# Patient Record
Sex: MALE | Race: WHITE | NOT HISPANIC OR LATINO | Employment: OTHER | URBAN - METROPOLITAN AREA
[De-identification: names, ages, dates, MRNs, and addresses within clinical notes are randomized per-mention and may not be internally consistent; named-entity substitution may affect disease eponyms.]

---

## 2017-01-21 RX ORDER — TIOTROPIUM BROMIDE 18 UG/1
CAPSULE ORAL; RESPIRATORY (INHALATION)
Qty: 30 CAPSULE | Refills: 6 | Status: SHIPPED | OUTPATIENT
Start: 2017-01-21 | End: 2017-08-31 | Stop reason: SDUPTHER

## 2017-01-31 RX ORDER — ATORVASTATIN CALCIUM 80 MG/1
TABLET, FILM COATED ORAL
Qty: 30 TABLET | Refills: 6 | Status: SHIPPED | OUTPATIENT
Start: 2017-01-31 | End: 2017-09-11 | Stop reason: SDUPTHER

## 2017-02-03 ENCOUNTER — INITIAL CONSULT (OUTPATIENT)
Dept: BARIATRICS | Facility: CLINIC | Age: 72
End: 2017-02-03
Payer: MEDICARE

## 2017-02-03 VITALS
WEIGHT: 242.31 LBS | DIASTOLIC BLOOD PRESSURE: 82 MMHG | SYSTOLIC BLOOD PRESSURE: 126 MMHG | HEART RATE: 79 BPM | HEIGHT: 69 IN | BODY MASS INDEX: 35.89 KG/M2

## 2017-02-03 DIAGNOSIS — G47.33 OSA (OBSTRUCTIVE SLEEP APNEA): Primary | ICD-10-CM

## 2017-02-03 DIAGNOSIS — Z87.891 HISTORY OF SMOKING 30 OR MORE PACK YEARS: ICD-10-CM

## 2017-02-03 DIAGNOSIS — I10 HTN (HYPERTENSION), BENIGN: ICD-10-CM

## 2017-02-03 DIAGNOSIS — I25.10 CORONARY ARTERY DISEASE INVOLVING NATIVE CORONARY ARTERY OF NATIVE HEART WITHOUT ANGINA PECTORIS: Chronic | ICD-10-CM

## 2017-02-03 DIAGNOSIS — K21.9 GASTROESOPHAGEAL REFLUX DISEASE WITHOUT ESOPHAGITIS: ICD-10-CM

## 2017-02-03 PROCEDURE — 99999 PR PBB SHADOW E&M-EST. PATIENT-LVL V: CPT | Mod: PBBFAC,,, | Performed by: PHYSICIAN ASSISTANT

## 2017-02-03 PROCEDURE — 99215 OFFICE O/P EST HI 40 MIN: CPT | Mod: PBBFAC | Performed by: PHYSICIAN ASSISTANT

## 2017-02-03 PROCEDURE — 99204 OFFICE O/P NEW MOD 45 MIN: CPT | Mod: S$PBB,,, | Performed by: PHYSICIAN ASSISTANT

## 2017-02-03 RX ORDER — OMEPRAZOLE 20 MG/1
CAPSULE, DELAYED RELEASE ORAL
Qty: 30 CAPSULE | Refills: 10 | Status: SHIPPED | OUTPATIENT
Start: 2017-02-03 | End: 2017-12-15 | Stop reason: SDUPTHER

## 2017-02-03 NOTE — LETTER
[facility]  [address]  [facility] [address]  Phone: [phone]  Fax: [phone] February 3, 2017      Prince Ba MD  [address]  [facility] [address]    Patient: Jordin Allen Jr.   MR Number: [id number]   YOB: 1945   Date of Visit: 2/3/2017     Dear Dr. Ba:    Jordin Allen is being evaluated for bariatric surgery, specifically the gastric sleeve. Please provide a risk stratification for Mr Allen prior to surgery.  He will call your office to set up an appointment with you.      Jordin Allen Jr. is a 71-year-old male presenting for obesity Body mass index is 35.78 kg/(m^2), and inability to lose weight. The patient has tried low calorie dieting and weight loss clinic about 30 years ago. He states that he was able to lose about 30-35 pounds through the weight loss clinic. He states that he gained weight surrounding his surgeries in  and then his wife  around that same time. He states that he has not had success inw eight loss since. His weight prior to this was 210 pounds. He is currently at his heaviest adult weight.     DIAGNOSIS:  1. Obesity with Body mass index is 35.78 kg/(m^2). and inability to lose weight.  2. Co-morbidities: CAD s/p stenting, GERD, SHOLA on CPAP, hypertension, and COPD.  3. History of tobacco use, quit 4 years ago.      PLAN:  The patient is a good candidate for Bariatric Surgery. he is interested in sleeve gastrectomy with Dr. Cornejo. The surgery and post-op care were discussed in detail with the patient. All questions were answered.     He understands that bariatric surgery is a tool to aid in weight loss and that he needs to be committed to the diet and exercise post-operatively for successful weight loss. Discussed expected weight loss outcomes after surgery which is 50% of the excess weight on his frame. Goal weight is 200#s. Discussed with patient that bariatric surgery is not the easy way out and that  it will take plenty of dedication on the patient's part to be successful. Also discussed the possibility of weight regain if the patient strays from the diet guidelines or exercise requirements. Patient verbalized understanding and wishes to proceed with the work-up.     ORDERS:  1. Chest X-Ray and EKG  2. Psychological Consult, Bariatric Dietician Consult, Cardiac Clearance, Pulmonology Clearance and PCP Clearance  3. Bariatric Labs: BMP, CBC, Folate Serum, H. pylori, HgA1C, Hepatic Panel/LFT, Iron & TIBC, Lipid Profile, Magnesium, Phosphate, T3, T4, TSH, Free T4, Vitamin B12, and Vitamin B1.    If you have questions, please do not hesitate to call me. I look forward to following Jordin along with you.    Sincerely,      Tanya Jensen PA-C   Section of Bariatric Surgery  [facility]    LUZ MARINA/eric    CC  LISA Mccain MD

## 2017-02-03 NOTE — PROGRESS NOTES
BARIATRIC NEW PATIENT EVALUATION    CHIEF COMPLAINT:   Obesity Body mass index is 35.78 kg/(m^2). and inability to lose weight.    HISTORY OF PRESENT ILLNESS:  Jordin Allen Jr.  is a 71 y.o. male presenting for obesity Body mass index is 35.78 kg/(m^2). and inability to lose weight. The patient has tried low calorie dieting and weight loss clinic about 30 years ago.  He states that he was able to lose about 30-35 pounds through the weight loss clinic.  He states that he gained weight surrounding his surgeries in  and then his wife  around that same time.  He states that he has not had success inw eight loss since.  His weight prior to this was 210 pounds.  He is currently at his heaviest adult weight.       PAST MEDICAL HISTORY:  Past Medical History   Diagnosis Date    Benign neoplasm of colon 10/1/2013    Bronchitis chronic     CAD (coronary artery disease) 10/31/2013    COPD (chronic obstructive pulmonary disease)     Emphysema of lung     GERD (gastroesophageal reflux disease)     Hx of colonic polyps     Hypertension     SHOLA on CPAP     RLS (restless legs syndrome)     Screen for colon cancer 2013         PAST SURGICAL HISTORY:  Past Surgical History   Procedure Laterality Date    Cataract extraction, bilateral      Bilateral foot surgery      Cardiac catheterization      Colon surgery       Ace Mott MD       FAMILY HISTORY:  Family History   Problem Relation Age of Onset    Heart disease Mother     Asthma Neg Hx     Emphysema Neg Hx     Prostate cancer Neg Hx        SOCIAL HISTORY:  Social History     Social History    Marital status:      Spouse name: N/A    Number of children: N/A    Years of education: N/A     Occupational History    Not on file.     Social History Main Topics    Smoking status: Former Smoker     Packs/day: 1.00     Years: 40.00     Types: Cigarettes     Quit date: 8/15/2012    Smokeless tobacco: Never Used    Alcohol use Yes       Comment: social use only    Drug use: No    Sexual activity: Yes     Partners: Female     Other Topics Concern    Not on file     Social History Narrative    Retired .  .         MEDICATIONS:  Current Outpatient Prescriptions   Medication Sig Dispense Refill    albuterol 90 mcg/actuation inhaler Inhale 2 puffs into the lungs every 4 (four) hours as needed for Wheezing. 1 each 12    albuterol-ipratropium 2.5mg-0.5mg/3mL (DUO-NEB) 0.5 mg-3 mg(2.5 mg base)/3 mL nebulizer solution INHALE 1 VIAL VIA NEBULIZER EVERY 6 HOURS AS NEEDED 180 mL 5    amitriptyline (ELAVIL) 25 MG tablet TAKE 1 TABLET BY MOUTH EVERY DAY 30 tablet 6    aspirin (ECOTRIN) 81 MG EC tablet Take 81 mg by mouth once.      atorvastatin (LIPITOR) 80 MG tablet TAKE 1 TABLET (80 MG TOTAL) BY MOUTH ONCE DAILY. 30 tablet 6    BETA-CAROTENE,A, W-C & E/MIN (OCUVITE ORAL) Take by mouth once daily.       DALIRESP 500 mcg Tab TAKE 1 TABLET BY MOUTH EVERY DAY 30 tablet 6    echinacea 400 mg Cap Take by mouth once daily.       fluticasone (FLONASE) 50 mcg/actuation nasal spray INSTILL 1 SPRAY IN EACH NOSTRIL ONCE DAILY. 16 g 6    hydrochlorothiazide (HYDRODIURIL) 25 MG tablet Take 0.5 tablets (12.5 mg total) by mouth once daily. 15 tablet 11    latanoprost 0.005 % ophthalmic solution Place 1 drop into both eyes once daily.       losartan (COZAAR) 100 MG tablet TAKE 1 TABLET BY MOUTH ONCE A DAY 30 tablet 3    nitroGLYCERIN (NITROSTAT) 0.4 MG SL tablet Place 0.4 mg under the tongue every 5 (five) minutes as needed.      omeprazole (PRILOSEC) 20 MG capsule TAKE 1 CAPSULE BY MOUTH EVERY DAY 30 capsule 10    predniSONE (DELTASONE) 5 MG tablet Take 1 tab PO every other day.  Take with food. 16 tablet 6    SPIRIVA WITH HANDIHALER 18 mcg inhalation capsule INHALE 1 CAPSULE WITH INHALATION DEVICE ONCE DAILY 30 capsule 6    SYMBICORT 160-4.5 mcg/actuation HFAA INHALE 2 PUFFS INTO THE LUNGS EVERY 12 HOURS. RINSE MOUTH AFTER USE 10.2  "Inhaler 6    ZOSTAVAX, PF, 19,400 unit/0.65 mL injection        No current facility-administered medications for this visit.        ALLERGIES:  Review of patient's allergies indicates:   Allergen Reactions    Brilinta [ticagrelor] Itching    Lisinopril      Other reaction(s): cough    Metoprolol succinate Rash       ROS:  Review of Systems   Constitutional: Negative for chills, fever and weight loss.   Eyes: Negative for blurred vision, double vision and pain.   Respiratory: Negative for cough, shortness of breath and wheezing.    Cardiovascular: Negative for chest pain, palpitations and leg swelling.   Gastrointestinal: Negative for abdominal pain, blood in stool, constipation, diarrhea, heartburn, melena, nausea and vomiting.   Genitourinary: Negative for dysuria, frequency and hematuria.   Skin: Negative for itching and rash.   Neurological: Negative for headaches.   Psychiatric/Behavioral: Negative for depression and suicidal ideas.       PE:  Vitals:    02/03/17 1314   BP: 126/82   Pulse: 79   Weight: 109.9 kg (242 lb 4.6 oz)   Height: 5' 9" (1.753 m)       Physical Exam   Constitutional: He is oriented to person, place, and time. He appears well-developed and well-nourished. No distress.   HENT:   Head: Normocephalic and atraumatic.   Eyes: Right eye exhibits no discharge. Left eye exhibits no discharge. No scleral icterus.   Cardiovascular: Normal rate, regular rhythm, normal heart sounds and intact distal pulses.    Pulmonary/Chest: Effort normal and breath sounds normal. No respiratory distress.   Abdominal: Soft. Bowel sounds are normal. He exhibits no distension and no mass. There is no tenderness. There is no rebound and no guarding. No hernia.   Neurological: He is alert and oriented to person, place, and time.   Skin: Skin is warm and dry. No rash noted. He is not diaphoretic. No erythema. No pallor.   Psychiatric: He has a normal mood and affect. His behavior is normal. Judgment and thought " content normal.   Nursing note and vitals reviewed.       DIAGNOSIS:  1. Obesity with Body mass index is 35.78 kg/(m^2). and inability to lose weight.  2. Co-morbidities: CAD s/p stenting, GERD, SHOLA on CPAP, hypertension, and COPD.  3. History of tobacco use, quit 4 years ago.     PLAN:  The patient is a good candidate for Bariatric Surgery. he is interested in sleeve gastrectomy with Dr. Cornejo. The surgery and post-op care were discussed in detail with the patient. All questions were answered.    he understands that bariatric surgery is a tool to aid in weight loss and that he needs to be committed to the diet and exercise post-operatively for successful weight loss.  Discussed expected weight loss outcomes after surgery which is 50% of the excess weight on his frame.  Goal weight is 200#s.  Discussed with patient that bariatric surgery is not the easy way out and that it will take plenty of dedication on the patient's part to be successful. Also discussed the possibility of weight regain if the patient strays from the diet guidelines or exercise requirements. Patient verbalized understanding and wishes to proceed with the work-up.    ORDERS:  1. Chest X-Ray and EKG  2. Psychological Consult, Bariatric Dietician Consult, Cardiac Clearance, Pulmonology Clearance and PCP Clearance  3. Bariatric Labs: BMP, CBC, Folate Serum, H. pylori, HgA1C, Hepatic Panel/LFT, Iron & TIBC, Lipid Profile, Magnesium, Phosphate, T3, T4, TSH, Free T4, Vitamin B12, and Vitamin B1.    Primary Physician: APRIL Mccain MD  RTC: As scheduled.    Blue packet reviewed, pertinent information entered in Epic.    45 minute visit, over 50% of time spent counseling patient face to face on surgical options, risks, benefits, expected diet, recommended exercise regimen, and expected weight loss.

## 2017-02-03 NOTE — MR AVS SNAPSHOT
[facility] - Bariatric Surgery  [address]  [facility] [address]  Phone: [phone]  Fax: [phone]                  Jordin Allen    2/3/2017 1:40 PM   Initial consult    Description:  Male : 1945   Provider:  Tanya Jensen PA-C   Department:  [facility] - Bariatric Surgery           Reason for Visit     Consult           Diagnoses this Visit        Comments    SHOLA (obstructive sleep apnea)    -  Primary     Gastroesophageal reflux disease without esophagitis         Coronary artery disease involving native coronary artery of native heart without angina pectoris         HTN (hypertension), benign         History of smoking 30 or more pack years         Obesity, Class II, BMI 35-39.9, with comorbidity                To Do List           Goals (5 Years of Data)     None      Follow-Up and Disposition     Return for diet appointment.      [facility]     [facility] -  Assistance  Registered nurses in the [facility] provide clinical advisement, health education, appointment booking, and other advisory services.  Call for this free service at [phone].             Medications           Message regarding Medications     Verify the changes and/or additions to your medication regime listed below are the same as discussed with your clinician today.  If any of these changes or additions are incorrect, please notify your healthcare provider.        STOP taking these medications     loratadine (CLARITIN) 10 mg tablet Take 1 tablet (10 mg total) by mouth once daily.           Verify that the below list of medications is an accurate representation of the medications you are currently taking.  If none reported, the list may be blank. If incorrect, please contact your healthcare provider. Carry this list with you in case of emergency.           Current Medications     albuterol 90 mcg/actuation inhaler Inhale 2 puffs into the lungs every 4 (four)  "hours as needed for Wheezing.    albuterol-ipratropium 2.5mg-0.5mg/3mL (DUO-NEB) 0.5 mg-3 mg(2.5 mg base)/3 mL nebulizer solution INHALE 1 VIAL VIA NEBULIZER EVERY 6 HOURS AS NEEDED    amitriptyline (ELAVIL) 25 MG tablet TAKE 1 TABLET BY MOUTH EVERY DAY    aspirin (ECOTRIN) 81 MG EC tablet Take 81 mg by mouth once.    atorvastatin (LIPITOR) 80 MG tablet TAKE 1 TABLET (80 MG TOTAL) BY MOUTH ONCE DAILY.    BETA-CAROTENE,A, W-C & E/MIN (OCUVITE ORAL) Take by mouth once daily.     DALIRESP 500 mcg Tab TAKE 1 TABLET BY MOUTH EVERY DAY    echinacea 400 mg Cap Take by mouth once daily.     fluticasone (FLONASE) 50 mcg/actuation nasal spray INSTILL 1 SPRAY IN EACH NOSTRIL ONCE DAILY.    hydrochlorothiazide (HYDRODIURIL) 25 MG tablet Take 0.5 tablets (12.5 mg total) by mouth once daily.    latanoprost 0.005 % ophthalmic solution Place 1 drop into both eyes once daily.     losartan (COZAAR) 100 MG tablet TAKE 1 TABLET BY MOUTH ONCE A DAY    nitroGLYCERIN (NITROSTAT) 0.4 MG SL tablet Place 0.4 mg under the tongue every 5 (five) minutes as needed.    omeprazole (PRILOSEC) 20 MG capsule TAKE 1 CAPSULE BY MOUTH EVERY DAY    predniSONE (DELTASONE) 5 MG tablet Take 1 tab PO every other day.  Take with food.    SPIRIVA WITH HANDIHALER 18 mcg inhalation capsule INHALE 1 CAPSULE WITH INHALATION DEVICE ONCE DAILY    SYMBICORT 160-4.5 mcg/actuation HFAA INHALE 2 PUFFS INTO THE LUNGS EVERY 12 HOURS. RINSE MOUTH AFTER USE    ZOSTAVAX, PF, 19,400 unit/0.65 mL injection            Clinical Reference Information           Your Vitals Were     BP Pulse Height Weight BMI    126/82 79 5' 9" (1.753 m) 109.9 kg (242 lb 4.6 oz) 35.78 kg/m2      Blood Pressure          Most Recent Value    BP  126/82      Allergies as of 2/3/2017     Brilinta [Ticagrelor]    Lisinopril    Metoprolol Succinate      Immunizations Administered on Date of Encounter - 2/3/2017     None      Orders Placed During Today's Visit      Normal Orders This Visit    Psych " Consult     Future Labs/Procedures Expected by Expires    BMP  2/3/2017 4/4/2018    CBC w/ Auto Differential  2/3/2017 4/4/2018    CXR  2/3/2017 2/3/2018    Free T4  2/3/2017 4/4/2018    H. Pylori Antibody, IGG  2/3/2017 4/4/2018    Hepatic Panel  2/3/2017 4/4/2018    Lipid Profile  2/3/2017 4/4/2018    Magnesium  2/3/2017 4/4/2018    Phosphorus  2/3/2017 4/4/2018    T3  2/3/2017 4/4/2018    T4  2/3/2017 4/4/2018    TSH  2/3/2017 4/4/2018    Vitamin B1  2/3/2017 4/4/2018    EKG  As directed 2/3/2018      Instructions    Prior to surgery you will need to complete:  - Dietitian consult and follow up appointments as needed  - PCP clearance (30 days of surgery)  - Labs  - Chest X-ray  - EKG  - Psychological evaluation, Please call psychiatry [phone] to schedule  - Cardiac clearance  - Pulmonology Clearance    In preparation for bariatric surgery, please complete the following:   · Discuss your current medications with your primary care provider, remember your medications will need to be crushed, chewable, or in liquid form for the first 3-6 months after a gastric bypass or sleeve.  For a gastric band, your medications will need to be crushed indefinitely.    · If you take a blood thinner such as: Coumadin (warfarin), Pradaxa (dabigatran), or Plavix (clopidogrel), you will need to speak with your prescribing provider on how or if this medication can be stopped before surgery.   · If you take a medication for depression or anxiety, you will need to begin crushing or opening the capsule 1-3 months prior to surgery.  Remember to discuss this with the psychologist or psychiatrist that you see.   · If you take medication for arthritis on a daily basis that is considered a non-steroidal anti-inflammatory (NSAID), please discuss with your prescribing physician an alternative medication.  After having gastric bypass or gastric sleeve, this group of medications is not appropriate to take due to increased risk of bleeding  stomach ulcers.      DEFINITIONS  Appointments: Pre-scheduled meetings or consultations with any physician, advanced practice provider, dietitian, or psychologist, and labs, imaging studies, sleep studies, etc.   Late cancellation: Cancelling an appointment 24-48 hours prior to scheduled time.  No-Show appointment:  is when    You do NOT arrive to your appointment at the time its scheduled.   You call to cancel or cancel via [facility] less than 24 hours in advance of your scheduled appointment   You show up 15 minutes AFTER your scheduled appointment time without any notification of being late.     POLICY  1. You are allowed up to 3 cancellations for appointments.    After 3 cancellations your case will be placed on hold for 2 months and after that time you can resume the program.   2. You are allowed only 1 no-show for an appointment.    You will be re-scheduled one time and if there is a 2nd no-show at any point, your case will be placed on hold for 3 months.  After 3 months you can resume the program.     3. Upon resuming the program after being placed on hold for either above mentioned reasons, if you have a late cancel or no show for any appointment, the bariatric team will review if youre an appropriate candidate for surgery at the monthly meeting.             Language Assistance Services     ATTENTION: Language assistance services are available, free of charge. Please call [phone].      ATENCIÓN: Si nii couch, tiene a asher disposición servicios gratuitos de asistencia lingüística. Llame al [phone].     WIN Ý: N?u b?n nói Ti?ng Vi?t, có các d?ch v? h? tr? ngôn ng? mi?n phí dành cho b?n. G?i s? [phone].         [facility] complies with applicable Federal civil rights laws and does not discriminate on the basis of race, color, national origin, age, disability, or sex.

## 2017-02-03 NOTE — LETTER
[facility]  [address]  [facility] [address]  Phone: [phone]  Fax: [phone] February 3, 2017      SERENA Estrada MD  [address]  [facility] [address]    Patient: Jordin Allen Jr.   MR Number: [id number]   YOB: 1945   Date of Visit: 2/3/2017     Dear Dr. Estrada:    Jordin Allen is being evaluated for Bariatric surgery, specifically the gastric sleeve. Please provide a risk stratification for Mr Allen prior to surgery.  He will call your office to set up an appointment with you.      Patient is a 71-year-old male presenting for obesity Body mass index is 35.78 kg/(m^2), and inability to lose weight. The patient has tried low calorie dieting and weight loss clinic about 30 years ago. He states that he was able to lose about 30-35 pounds through the weight loss clinic. He states that he gained weight surrounding his surgeries in  and then his wife  around that same time. He states that he has not had success inw eight loss since. His weight prior to this was 210 pounds. He is currently at his heaviest adult weight.      DIAGNOSIS:  1. Obesity with Body mass index is 35.78 kg/(m^2). and inability to lose weight.  2. Co-morbidities: CAD status post stenting, GERD, SHOLA on CPAP, hypertension, and COPD.  3. History of tobacco use, quit 4 years ago.      PLAN: The patient is a good candidate for Bariatric Surgery. he is interested in sleeve gastrectomy with Dr. Cornejo. The surgery and post-op care were discussed in detail with the patient. All questions were answered.     He understands that bariatric surgery is a tool to aid in weight loss and that he needs to be committed to the diet and exercise post-operatively for successful weight loss. Discussed expected weight loss outcomes after surgery which is 50% of the excess weight on his frame. Goal weight is 200#s. Discussed with patient that bariatric surgery is not the easy way out and that  it will take plenty of dedication on the patient's part to be successful. Also discussed the possibility of weight regain if the patient strays from the diet guidelines or exercise requirements. Patient verbalized understanding and wishes to proceed with the work-up.      ORDERS:  1. Chest X-Ray and EKG  2. Psychological Consult, Bariatric Dietician Consult, Cardiac Clearance, Pulmonology Clearance and PCP Clearance  3. Bariatric Labs: BMP, CBC, Folate Serum, H. pylori, HgA1C, Hepatic Panel/LFT, Iron & TIBC, Lipid Profile, Magnesium, Phosphate, T3, T4, TSH, Free T4, Vitamin B12, and Vitamin B1.      If you have questions, please do not hesitate to call me. I look forward to following Jordin along with you.    Sincerely,      Tanya Jensen PA-C   Section of Bariatric Surgery  [facility]    LUZ MARINA/eric

## 2017-02-03 NOTE — PATIENT INSTRUCTIONS
Prior to surgery you will need to complete:  - Dietitian consult and follow up appointments as needed  - PCP clearance (30 days of surgery)  - Labs  - Chest X-ray  - EKG  - Psychological evaluation, Please call psychiatry [phone] to schedule  - Cardiac clearance  - Pulmonology Clearance    In preparation for bariatric surgery, please complete the following:   · Discuss your current medications with your primary care provider, remember your medications will need to be crushed, chewable, or in liquid form for the first 3-6 months after a gastric bypass or sleeve.  For a gastric band, your medications will need to be crushed indefinitely.    · If you take a blood thinner such as: Coumadin (warfarin), Pradaxa (dabigatran), or Plavix (clopidogrel), you will need to speak with your prescribing provider on how or if this medication can be stopped before surgery.   · If you take a medication for depression or anxiety, you will need to begin crushing or opening the capsule 1-3 months prior to surgery.  Remember to discuss this with the psychologist or psychiatrist that you see.   · If you take medication for arthritis on a daily basis that is considered a non-steroidal anti-inflammatory (NSAID), please discuss with your prescribing physician an alternative medication.  After having gastric bypass or gastric sleeve, this group of medications is not appropriate to take due to increased risk of bleeding stomach ulcers.      DEFINITIONS  Appointments: Pre-scheduled meetings or consultations with any physician, advanced practice provider, dietitian, or psychologist, and labs, imaging studies, sleep studies, etc.   Late cancellation: Cancelling an appointment 24-48 hours prior to scheduled time.  No-Show appointment:  is when    You do NOT arrive to your appointment at the time its scheduled.   You call to cancel or cancel via [facility] less than 24 hours in advance of your scheduled appointment   You show up 15 minutes  AFTER your scheduled appointment time without any notification of being late.     POLICY  1. You are allowed up to 3 cancellations for appointments.    After 3 cancellations your case will be placed on hold for 2 months and after that time you can resume the program.   2. You are allowed only 1 no-show for an appointment.    You will be re-scheduled one time and if there is a 2nd no-show at any point, your case will be placed on hold for 3 months.  After 3 months you can resume the program.     3. Upon resuming the program after being placed on hold for either above mentioned reasons, if you have a late cancel or no show for any appointment, the bariatric team will review if youre an appropriate candidate for surgery at the monthly meeting.

## 2017-02-08 DIAGNOSIS — J43.8 OTHER EMPHYSEMA: Primary | ICD-10-CM

## 2017-02-09 ENCOUNTER — OFFICE VISIT (OUTPATIENT)
Dept: CARDIOLOGY | Facility: CLINIC | Age: 72
End: 2017-02-09
Payer: MEDICARE

## 2017-02-09 VITALS
BODY MASS INDEX: 35.89 KG/M2 | DIASTOLIC BLOOD PRESSURE: 68 MMHG | WEIGHT: 242.31 LBS | HEART RATE: 94 BPM | HEIGHT: 69 IN | SYSTOLIC BLOOD PRESSURE: 103 MMHG

## 2017-02-09 DIAGNOSIS — Z87.891 HISTORY OF SMOKING 30 OR MORE PACK YEARS: ICD-10-CM

## 2017-02-09 DIAGNOSIS — I25.10 CORONARY ARTERY DISEASE INVOLVING NATIVE CORONARY ARTERY OF NATIVE HEART WITHOUT ANGINA PECTORIS: Chronic | ICD-10-CM

## 2017-02-09 DIAGNOSIS — Z01.810 PREOPERATIVE CARDIOVASCULAR EXAMINATION: Primary | ICD-10-CM

## 2017-02-09 DIAGNOSIS — G47.33 OSA (OBSTRUCTIVE SLEEP APNEA): ICD-10-CM

## 2017-02-09 DIAGNOSIS — I10 HTN (HYPERTENSION), BENIGN: ICD-10-CM

## 2017-02-09 PROCEDURE — 99213 OFFICE O/P EST LOW 20 MIN: CPT | Mod: PBBFAC | Performed by: INTERNAL MEDICINE

## 2017-02-09 PROCEDURE — 99999 PR PBB SHADOW E&M-EST. PATIENT-LVL III: CPT | Mod: PBBFAC,,, | Performed by: INTERNAL MEDICINE

## 2017-02-09 PROCEDURE — 99214 OFFICE O/P EST MOD 30 MIN: CPT | Mod: S$PBB,,, | Performed by: INTERNAL MEDICINE

## 2017-02-09 NOTE — PROGRESS NOTES
Subjective:   Patient ID:  Jordin Allen Jr. is a 71 y.o. male who presents for follow up of Pre-op Exam (bariatric surgery)      HPI: Here for preoperative evaluation prior to gastric sleeve operation.  He continues with phase III rehab and continues to do very well.  He denies chest discomfort, MONTIEL, palpitations, PND/orthopnea, lightheadedness and syncope.      June 2016 HPI: Mr. Allen is a very pleasant man who has been seen by Dr. Pinedo at [facility] for an MI on [facility] three years ago. He had an occluded proximal LAD that was treated successfully and he retained normal systolic function.     Prior to his initial visit with me 15 months ago, he had had a rash and was inadvertently told by a nurse to hold his ASA and Lipitor. He's back on that now and doing fine. He has had a problem with pruritic rashes in the past but that is not a problem currently and hasn't had a flare in about 3 months. These patches are now mainly on his anterior forearms.     He's going to phase III rehab now and doing well. He denies chest discomfort, MONTIEL, palpitations, PND/orthopnea, lightheadedness and syncope.     He takes prednisone 10mg every other day as directed by a Pulmonologist.    Patient Active Problem List   Diagnosis    Centrilobular emphysema    GERD (gastroesophageal reflux disease)    HTN (hypertension), benign    Sciatica    SHOLA (obstructive sleep apnea)    Restless leg syndrome    Screen for colon cancer    Benign neoplasm of colon    CAD (coronary artery disease)    Rash    Obesity, Class II, BMI 35-39.9, with comorbidity    History of smoking 30 or more pack years       Current Outpatient Prescriptions   Medication Sig    albuterol 90 mcg/actuation inhaler Inhale 2 puffs into the lungs every 4 (four) hours as needed for Wheezing.    albuterol-ipratropium 2.5mg-0.5mg/3mL (DUO-NEB) 0.5 mg-3 mg(2.5 mg base)/3 mL nebulizer solution INHALE 1 VIAL VIA NEBULIZER EVERY 6 HOURS AS NEEDED    amitriptyline  (ELAVIL) 25 MG tablet TAKE 1 TABLET BY MOUTH EVERY DAY    aspirin (ECOTRIN) 81 MG EC tablet Take 81 mg by mouth once.    atorvastatin (LIPITOR) 80 MG tablet TAKE 1 TABLET (80 MG TOTAL) BY MOUTH ONCE DAILY.    BETA-CAROTENE,A, W-C & E/MIN (OCUVITE ORAL) Take by mouth once daily.     DALIRESP 500 mcg Tab TAKE 1 TABLET BY MOUTH EVERY DAY    echinacea 400 mg Cap Take by mouth once daily.     fluticasone (FLONASE) 50 mcg/actuation nasal spray INSTILL 1 SPRAY IN EACH NOSTRIL ONCE DAILY.    hydrochlorothiazide (HYDRODIURIL) 25 MG tablet Take 0.5 tablets (12.5 mg total) by mouth once daily.    latanoprost 0.005 % ophthalmic solution Place 1 drop into both eyes once daily.     losartan (COZAAR) 100 MG tablet TAKE 1 TABLET BY MOUTH ONCE A DAY    nitroGLYCERIN (NITROSTAT) 0.4 MG SL tablet Place 0.4 mg under the tongue every 5 (five) minutes as needed.    omeprazole (PRILOSEC) 20 MG capsule TAKE 1 CAPSULE BY MOUTH EVERY DAY    predniSONE (DELTASONE) 5 MG tablet Take 1 tab PO every other day.  Take with food.    SPIRIVA WITH HANDIHALER 18 mcg inhalation capsule INHALE 1 CAPSULE WITH INHALATION DEVICE ONCE DAILY    SYMBICORT 160-4.5 mcg/actuation HFAA INHALE 2 PUFFS INTO THE LUNGS EVERY 12 HOURS. RINSE MOUTH AFTER USE     No current facility-administered medications for this visit.        Review of Systems   Constitution: Negative.   HENT: Negative.    Eyes: Negative.    Cardiovascular: Negative.  Negative for chest pain, dyspnea on exertion, near-syncope, orthopnea and palpitations.   Respiratory: Negative.  Negative for cough, hemoptysis and shortness of breath.    Endocrine: Negative.    Hematologic/Lymphatic: Negative.    Skin: Negative.    Musculoskeletal: Negative.    Gastrointestinal: Negative.    Genitourinary: Negative.    Neurological: Negative.    Psychiatric/Behavioral: Negative.      Objective:   Physical Exam   Constitutional: He is oriented to person, place, and time. He appears well-developed and  well-nourished.   HENT:   Head: Normocephalic and atraumatic.   Mouth/Throat: Oropharynx is clear and moist.   Eyes: Conjunctivae and EOM are normal. No scleral icterus.   Neck: Normal range of motion. Neck supple. No JVD present.   Cardiovascular: Normal rate, regular rhythm, normal heart sounds and intact distal pulses.  Exam reveals no gallop and no friction rub.    No murmur heard.  Pulmonary/Chest: Effort normal and breath sounds normal. He has no wheezes. He has no rales.   Abdominal: Soft. Bowel sounds are normal. He exhibits no distension. There is no tenderness.   Musculoskeletal: Normal range of motion. He exhibits no edema.   Neurological: He is alert and oriented to person, place, and time.   Skin: Skin is warm and dry. No rash noted. No erythema.   Psychiatric: He has a normal mood and affect. His behavior is normal. Judgment and thought content normal.   Vitals reviewed.      Lab Results   Component Value Date    WBC 9.91 07/28/2016    HGB 14.1 07/28/2016    HCT 45.4 07/28/2016    MCV 85 07/28/2016     07/28/2016         Chemistry        Component Value Date/Time     07/28/2016 0710    K 4.2 07/28/2016 0710     07/28/2016 0710    CO2 29 07/28/2016 0710    BUN 17 07/28/2016 0710    CREATININE 0.9 07/28/2016 0710    GLU 93 07/28/2016 0710        Component Value Date/Time    CALCIUM 9.5 07/28/2016 0710    ALKPHOS 103 07/28/2016 0710    AST 22 07/28/2016 0710    ALT 28 07/28/2016 0710    BILITOT 0.7 07/28/2016 0710            Lab Results   Component Value Date    CHOL 98 (L) 07/28/2016    CHOL 103 (L) 07/06/2015    CHOL 100 (L) 05/19/2014     Lab Results   Component Value Date    HDL 41 07/28/2016    HDL 43 07/06/2015    HDL 43 05/19/2014     Lab Results   Component Value Date    LDLCALC 45.8 (L) 07/28/2016    LDLCALC 42.0 (L) 07/06/2015    LDLCALC 45.8 (L) 05/19/2014     Lab Results   Component Value Date    TRIG 56 07/28/2016    TRIG 90 07/06/2015    TRIG 56 05/19/2014     Lab Results    Component Value Date    CHOLHDL 41.8 07/28/2016    CHOLHDL 41.7 07/06/2015    CHOLHDL 43.0 05/19/2014       Lab Results   Component Value Date    TSH 1.171 07/28/2016       Lab Results   Component Value Date    HGBA1C 5.8 05/30/2013       Assessment:     1. Preoperative cardiovascular examination    2. Coronary artery disease involving native coronary artery of native heart without angina pectoris    3. HTN (hypertension), benign    4. SHOLA (obstructive sleep apnea)    5. History of smoking 30 or more pack years        Plan:     He has no angina, heart failure, or unstable arrhythmia.  No further cardiovascular testing is required prior to proceeding to the operating room.    Continue current medicines.    Diet/exercise goals reinforced.    F/U 12 months

## 2017-02-09 NOTE — MR AVS SNAPSHOT
[facility]iology  [address]  [facility] [address]  Phone: [phone]                  Jordin Allen JrGreg   2017 11:30 AM   Office Visit    Description:  Male : 1945   Provider:  Prince Ba MD   Department:  [facility]           Reason for Visit     Pre-op Exam           Diagnoses this Visit        Comments    Preoperative cardiovascular examination    -  Primary     Coronary artery disease involving native coronary artery of native heart without angina pectoris         HTN (hypertension), benign         SHOLA (obstructive sleep apnea)         History of smoking 30 or more pack years         Obesity, Class II, BMI 35-39.9, with comorbidity                To Do List           Future Appointments        Provider Department Dept Phone    2017 11:30 AM MD [facility] Cardiology [phone]    3/7/2017 8:10 AM LAB, APPOINTMENT [place] [facility] [phone]    3/7/2017 8:45 AM [facility] [id number] 485 LB LIMIT [facility] [phone]    3/7/2017 9:30 AM EKG, APPT [facility] [phone]    3/7/2017 10:00 AM Yohana Nails [facility] - Bariatric Surgery [phone]      Goals (5 Years of Data)     None      Follow-Up and Disposition     Return in about 1 year (around 2018).    Follow-up and Disposition History      [facility] On Call     [facility] Care [facility] -  Assistance  Registered nurses in the [facility] provide clinical advisement, health education, appointment booking, and other advisory services.  Call for this free service at [phone].             Medications           Message regarding Medications     Verify the changes and/or additions to your medication regime listed below are the same as discussed with your clinician today.  If any of these changes or additions are incorrect, please notify your healthcare provider.        STOP taking these medications     ZOSTAVAX,  PF, 19,400 unit/0.65 mL injection            Verify that the below list of medications is an accurate representation of the medications you are currently taking.  If none reported, the list may be blank. If incorrect, please contact your healthcare provider. Carry this list with you in case of emergency.           Current Medications     albuterol 90 mcg/actuation inhaler Inhale 2 puffs into the lungs every 4 (four) hours as needed for Wheezing.    albuterol-ipratropium 2.5mg-0.5mg/3mL (DUO-NEB) 0.5 mg-3 mg(2.5 mg base)/3 mL nebulizer solution INHALE 1 VIAL VIA NEBULIZER EVERY 6 HOURS AS NEEDED    amitriptyline (ELAVIL) 25 MG tablet TAKE 1 TABLET BY MOUTH EVERY DAY    aspirin (ECOTRIN) 81 MG EC tablet Take 81 mg by mouth once.    atorvastatin (LIPITOR) 80 MG tablet TAKE 1 TABLET (80 MG TOTAL) BY MOUTH ONCE DAILY.    BETA-CAROTENE,A, W-C & E/MIN (OCUVITE ORAL) Take by mouth once daily.     DALIRESP 500 mcg Tab TAKE 1 TABLET BY MOUTH EVERY DAY    echinacea 400 mg Cap Take by mouth once daily.     fluticasone (FLONASE) 50 mcg/actuation nasal spray INSTILL 1 SPRAY IN EACH NOSTRIL ONCE DAILY.    hydrochlorothiazide (HYDRODIURIL) 25 MG tablet Take 0.5 tablets (12.5 mg total) by mouth once daily.    latanoprost 0.005 % ophthalmic solution Place 1 drop into both eyes once daily.     losartan (COZAAR) 100 MG tablet TAKE 1 TABLET BY MOUTH ONCE A DAY    nitroGLYCERIN (NITROSTAT) 0.4 MG SL tablet Place 0.4 mg under the tongue every 5 (five) minutes as needed.    omeprazole (PRILOSEC) 20 MG capsule TAKE 1 CAPSULE BY MOUTH EVERY DAY    predniSONE (DELTASONE) 5 MG tablet Take 1 tab PO every other day.  Take with food.    SPIRIVA WITH HANDIHALER 18 mcg inhalation capsule INHALE 1 CAPSULE WITH INHALATION DEVICE ONCE DAILY    SYMBICORT 160-4.5 mcg/actuation HFAA INHALE 2 PUFFS INTO THE LUNGS EVERY 12 HOURS. RINSE MOUTH AFTER USE           Clinical Reference Information           Your Vitals Were     BP Pulse Height Weight BMI     "103/68 (BP Location: Left arm, Patient Position: Sitting, BP Method: Automatic) 94 5' 9" (1.753 m) 109.9 kg (242 lb 4.6 oz) 35.78 kg/m2      Blood Pressure          Most Recent Value    Right Arm BP - Sitting  108/70    Left Arm BP - Sitting  103/68    BP  103/68      Allergies as of 2/9/2017     Brilinta [Ticagrelor]    Lisinopril    Metoprolol Succinate      Immunizations Administered on Date of Encounter - 2/9/2017     None      Language Assistance Services     ATTENTION: Language assistance services are available, free of charge. Please call [phone].      ATENCIÓN: Si habla español, tiene a asher disposición servicios gratuitos de asistencia lingüística. Llame al [phone].     CHÚ Ý: N?u b?n nói Ti?ng Vi?t, có các d?ch v? h? tr? ngôn ng? mi?n phí dành cho b?n. G?i s? [phone].         [facility] complies with applicable Federal civil rights laws and does not discriminate on the basis of race, color, national origin, age, disability, or sex.        "

## 2017-02-20 ENCOUNTER — OFFICE VISIT (OUTPATIENT)
Dept: PSYCHIATRY | Facility: CLINIC | Age: 72
End: 2017-02-20
Payer: MEDICARE

## 2017-02-20 DIAGNOSIS — Z01.818 PREOPERATIVE EVALUATION TO RULE OUT SURGICAL CONTRAINDICATION: Primary | ICD-10-CM

## 2017-02-20 DIAGNOSIS — I10 ESSENTIAL HYPERTENSION: ICD-10-CM

## 2017-02-20 DIAGNOSIS — G47.33 OBSTRUCTIVE SLEEP APNEA: ICD-10-CM

## 2017-02-20 DIAGNOSIS — E66.9 OBESITY, UNSPECIFIED OBESITY SEVERITY, UNSPECIFIED OBESITY TYPE: ICD-10-CM

## 2017-02-20 PROCEDURE — 90791 PSYCH DIAGNOSTIC EVALUATION: CPT | Mod: PBBFAC | Performed by: PSYCHOLOGIST

## 2017-02-20 PROCEDURE — 96102 PR PSYCHOLOGIC TESTING BY TECHNICIAN: CPT | Mod: 59,S$PBB,, | Performed by: PSYCHOLOGIST

## 2017-02-20 PROCEDURE — 96102 PR PSYCHOLOGIC TESTING BY TECHNICIAN: CPT | Mod: PBBFAC | Performed by: PSYCHOLOGIST

## 2017-02-20 PROCEDURE — 96101 PR PSYCHOLOGIC TESTING BY PSYCH/PHYS: CPT | Mod: S$PBB,,, | Performed by: PSYCHOLOGIST

## 2017-02-20 PROCEDURE — 96101 PR PSYCHOLOGIC TESTING BY PSYCH/PHYS: CPT | Mod: PBBFAC | Performed by: PSYCHOLOGIST

## 2017-02-20 PROCEDURE — 90791 PSYCH DIAGNOSTIC EVALUATION: CPT | Mod: S$PBB,,, | Performed by: PSYCHOLOGIST

## 2017-02-20 NOTE — PSYCH TESTING
[facility]  [address] [place]  [address]  [phone]    REPORT OF PSYCHOLOGICAL TESTING    NAME: Jordin Allen Jr.  OC #: [id number]  : 1945    REFERRED BY: Jordin Cornejo M.D.    EVALUATED BY:  Maria E Guadarrama, Ph.D., Clinical Psychologist  Jerry Guerrero M.S., Psychometrician    DATES OF EVALUATION: 2017, 2017    EVALUATION PROCEDURES AND TIMES:  Conducted by Psychologist:  Interpretation and report of test data  Integration of information from diagnostic interview, medical record, and testing data  Conducted by Technician:  [place] Multiphasic Personality Inventory - 2 Restructured Form (MMPI-2RF)  CPT Codes and Time:  52715 - 1 hour; 38256 - 1 hour  *MBMD not administered as it typically would be due to computer problems.    EVALUATION FINDINGS:  Before this evaluation was initiated, the purposes and process of the assessment and the limits of confidentiality were discussed with the patient who expressed understanding of these issues and orally consented to proceed with the evaluation.    Mr. Allen has struggled with weight on and off since childhood, but reports that he has gained an excessive amount of weight most recently after his [facility] (in ) and then again after his wife  (in ). He reports that he does not eat on a regular schedule - often skipping breakfast or snacking late at night while watching TV - and that he also has a problem with portion control when he does eat. He snacks on cookies and sandwiches, and admits that he likely does not consume enough protein. He denies eating fast food or drinking soda. Mr. Allen does not consider himself to be an emotional eater, but does believe that he gained weight after his wife's death due to the loss of structure that she provided regarding mealtimes. He has attempted weight loss only once during his life (during the mid-80's) with a medical weight loss program. He has  "never attempted dieting. His motivation for seeking surgery now is to improve his health and to feel more physically comfortable. His postsurgical goals include: having more energy and improved self-confidence. He states that after his wife  he "let himself go" and he is eager to "get his life back". Mr. Allen has not yet met with a bariatric dietician and he has NOT begun implementing any lifestyle changes in preparation for surgery.    Mr. Allen reports that he has no history of significant psychiatric problems or symptoms. He has never sought psychiatric treatment. He denies a history of eating disorder.    At his initial consultation with Ms. Tanya Jensen on 2017, his BMI was 35.8. His current medical comorbidities include: Sleep Apnea, CAD, and Hypertension. He has not yet met with a bariatric dietician and has not yet made changes to his lifestyle since beginning the bariatric program. He appeared reasonably well-informed regarding the details of the procedure and post-operative eating restrictions. He has a good understanding regarding the benefits of the procedure, though he was unaware of the potential risks, and he appears motivated for change. He was fully cooperative and engaged in the assessment process.  Mr. Allen produced a valid and interpretable MMPI-2RF protocol. His test scores should be considered a reasonably accurate reflection of his personality traits and current functioning. Mr. Garcia profile is in the normal range for all symptom categories, indicating that he does not currently experience any severe psychiatric problems, symptoms, or adjustment issues. Tests results do reflect that he has multiple specific fears (he discloses only a fear of snakes), that he tends to be slightly anger - prone (which he admits is true particularly when he is driving), and that he has a passive personality style.    DIAGNOSTIC IMPRESSIONS:  Z68.35    Body Mass Index of 35.8  Z01.818  " Pre-operative Exam  E66.9      Obesity    SUMMARY AND RECOMMENDATIONS:  Mr. Allen has a long history of weight problems and is pursuing bariatric surgery at this time in an effort to improve his health and quality of life. Results of personality testing should be considered valid, and they indicate that he is experiencing no acute psychiatric symptoms or declines in functioning at this time. Test results do not reveal any evidence that psychological difficulties would play a role in his recovery from surgery. No overt psychological contraindications for surgery were revealed by psychological testing.

## 2017-02-20 NOTE — PROGRESS NOTES
Psychiatry Initial Visit (PhD/LCSW)   Diagnostic Interview - CPT 73454     Date: 2017    Site: [facility]     Referral source: Jordin Cornejo M.D.     Clinical status of patient: Bernice Allen, a 71 y.o. male, presented for initial evaluation visit. Before this evaluation was initiated, the purposes and process of the assessment and the limits of confidentiality were discussed with the patient who expressed understanding of these issues and orally consented to proceed with the evaluation.     Chief complaint/reason for encounter: Routine psychological evaluation prior to bariatric surgery.     Type of surgery sought: Sleeve    History of present illness: Mr. Allen is a 71-year-old  male who is pursuing bariatric surgery to improve his health and quality of life. He has no history of significant psychological difficulties. He has never taken psychotropic medication, has never been hospitalized for psychiatric reasons, and denied any history of suicidality. He has not yet begun making positive lifestyle changes in anticipation for surgery. The patient has a Body Mass Index of 35.8 as documented by the referring provider.    Mr. Allen has struggled with weight on and off since childhood, but reports that he has gained an excessive amount of weight most recently after his residential (in ) and then again after his wife  (in ). He reports that he does not eat on a regular schedule - often skipping breakfast or snacking late at night while watching TV - and that he also has a problem with portion control when he does eat. He snacks on cookies and sandwiches, and admits that he likely does not consume enough protein. He denies eating fast food or drinking soda. Mr. Allen does not consider himself to be an emotional eater, but does believe that he gained weight after his wife's death due to the loss of structure that she provided regarding mealtimes. He has attempted  "weight loss only once during his life (during the mid-80's) with a medical weight loss program. He has never attempted dieting. His motivation for seeking surgery now is to improve his health and to feel more physically comfortable. His postsurgical goals include: having more energy and improved self-confidence. He states that after his wife  he "let himself go" and he is eager to "get his life back". Mr. Allen has not yet met with a bariatric dietician and he has NOT begun implementing any lifestyle changes in preparation for surgery.      Medical history: Coronary Artery Disease, Hypertension, Sleep Apnea     Pain: 0/10    Psychiatric Symptoms:   Depression - denied significant symptoms of depression.   Radha/Hypomania - denied significant symptoms of radha/hypomania.  Anxiety - denied significant symptoms of anxiety.   Thoughts - denied delusions, hallucinations.  Suicidal thoughts/behaviors - denied.  Substance abuse - denied abuse or dependence.   Sleep - 6 hours per night with use of CPAP.  Self-injury - denied.    Current psychiatric treatment:  Medications: None. Mr. Allen is prescribed Elavil for nerve pain in leg.    Psychotherapy: None.    Treating clinicians: N/A    Health behaviors: Reported adherence to pharmacologic regimen.      Psychiatric history:   Previous diagnosis: Denies.    Previous hospitalizations: Denies.     History of outpatient treatment: Denies.    Previous suicide attempt: Denies.      Trauma history:  Denies.      History of eating disorders:  History of bulimia: Denies.    History of binge-eating episodes: Denies.      Family history of psychiatric illness: None reported.     Social history (marriage, employment, etc.): Mr. Allen was born and raised in [place] by his biological parents and has 1 sister. He describes his childhood as "very good" and denies a history of abuse. He graduated from [employer] high school and attained his Bachelors degree at [employer]" "[employer]. He enlisted in the  ([employer]) from [employer]30-3839 and was not in combat. He worked for the [employer] for 30 years and retired in . Mr. Allen was  for 43 years and his wife  of breast cancer in . He is now single. He has 2 daughters (ages 43 and 41), both of whom live locally. He lives in [place] alone. He identifies as Rastafari.     Current psychosocial stressors: Loneliness at times - especially at night. Otherwise no stressors.    Report of coping skills: Golfing (goes almost every day with good friends), some volunteering, dylan.    Support system: His two daughters are the only people who know about his desire to have bariatric surgery. His older daughter had the gastric sleeve two years ago and has had success. They are both supportive of him getting surgery.    Substance use:   Alcohol: Socially - "maybe once a month"; denied history of abuse or dependency.   Drugs: Denied current use; denied history of abuse or dependency.  Tobacco: None. Quit smoking in .  Caffeine: 5-6 cups of coffee per day.    Current medications and drug reactions (include OTC, herbal): see medication list     Strengths and liabilities: Strength: Patient accepts guidance/feedback, Strength: Patient is expressive/articulate., Strength: Patient is intelligent., Strength: Patient is motivated for change., Strength: Patient has positive support network., Strength: Patient has reasonable judgment., Strength: Patient is stable.     Current Evaluation:    Mental Status Exam:   General Appearance:  age appropriate, well dressed, neatly groomed, overweight    Speech:  normal tone, normal rate, normal pitch, normal volume    Level of Cooperation:  cooperative    Thought Processes:  normal and logical    Mood:  euthymic    Thought Content:  normal, no suicidality, no homicidality, delusions, or paranoia    Affect:  congruent and appropriate    Orientation:  oriented x3    Memory:  recent memory " "intact; immediate and delayed word recall 3/3  remote memory intact; able to recall remote personal events   Attention Span & Concentration:  spelled "WORLD" forwards and backwards without errors   Fund of General Knowledge:  appropriate for education    Abstract Reasoning:  interpretation of proverbs was abstract; interpretation of similarities was concrete   Judgment & Insight:  good    Language  intact        Diagnostic Impression - Plan:      Diagnostic Impression:  Z01.818 Pre-operative examination   E66.9    Obesity  I10          Hypertension  G47.33   Obstructive Sleep Apnea  Z68.35    Body Mass Index of 35.8    Summary/Conclusion:   There are no overt psychological contraindications for proceeding with bariatric surgery. Mr. Allen has no significant psychiatric history, and reports no current psychiatric problems or major adjustment issues.  He has reasonable expectations for surgery and good social support. He has verbalized appropriate awareness and commitment to the necessary behavioral changes associated with bariatric surgery and appears willing to comply with long-term lifestyle changes. He has not yet met with a dietician or begun the process of making lifestyle changes for surgery, and his ability to make changes will need to be assessed by the dietary staff.     Recommendations:  -This patient is psychologically cleared to proceed with bariatric surgery.  -There are no recommendations for psychological treatment at this time. The patient is aware of resources available should his needs change in the future.    Please see Psychological Testing report available in Notes tab of Chart Review in Epic for results of psychological testing.  "

## 2017-02-21 ENCOUNTER — DOCUMENTATION ONLY (OUTPATIENT)
Dept: BARIATRICS | Facility: CLINIC | Age: 72
End: 2017-02-21

## 2017-03-05 ENCOUNTER — HOSPITAL ENCOUNTER (EMERGENCY)
Facility: HOSPITAL | Age: 72
Discharge: HOME | End: 2017-03-05
Attending: EMERGENCY MEDICINE
Payer: MEDICARE

## 2017-03-05 VITALS
DIASTOLIC BLOOD PRESSURE: 78 MMHG | TEMPERATURE: 99 F | WEIGHT: 235 LBS | SYSTOLIC BLOOD PRESSURE: 132 MMHG | OXYGEN SATURATION: 96 % | RESPIRATION RATE: 18 BRPM | HEIGHT: 69 IN | HEART RATE: 96 BPM | BODY MASS INDEX: 34.8 KG/M2

## 2017-03-05 DIAGNOSIS — E86.0 DEHYDRATION: Primary | ICD-10-CM

## 2017-03-05 DIAGNOSIS — B34.9 VIRAL ILLNESS: ICD-10-CM

## 2017-03-05 LAB
ALBUMIN SERPL BCP-MCNC: 3.6 G/DL
ALP SERPL-CCNC: 97 U/L
ALT SERPL W/O P-5'-P-CCNC: 45 U/L
ANION GAP SERPL CALC-SCNC: 12 MMOL/L
AST SERPL-CCNC: 44 U/L
BASOPHILS # BLD AUTO: 0.03 K/UL
BASOPHILS NFR BLD: 0.5 %
BILIRUB SERPL-MCNC: 1.4 MG/DL
BILIRUB UR QL STRIP: NEGATIVE
BUN SERPL-MCNC: 24 MG/DL
CALCIUM SERPL-MCNC: 9.4 MG/DL
CHLORIDE SERPL-SCNC: 100 MMOL/L
CLARITY UR REFRACT.AUTO: CLEAR
CO2 SERPL-SCNC: 26 MMOL/L
COLOR UR AUTO: YELLOW
CREAT SERPL-MCNC: 1.3 MG/DL
DIFFERENTIAL METHOD: ABNORMAL
EOSINOPHIL # BLD AUTO: 0 K/UL
EOSINOPHIL NFR BLD: 0.2 %
ERYTHROCYTE [DISTWIDTH] IN BLOOD BY AUTOMATED COUNT: 13.9 %
EST. GFR  (AFRICAN AMERICAN): >60 ML/MIN/1.73 M^2
EST. GFR  (NON AFRICAN AMERICAN): 55.7 ML/MIN/1.73 M^2
FLUAV AG SPEC QL IA: NEGATIVE
FLUBV AG SPEC QL IA: NEGATIVE
GLUCOSE SERPL-MCNC: 125 MG/DL
GLUCOSE UR QL STRIP: NEGATIVE
HCT VFR BLD AUTO: 45 %
HGB BLD-MCNC: 14.9 G/DL
HGB UR QL STRIP: NEGATIVE
KETONES UR QL STRIP: NEGATIVE
LEUKOCYTE ESTERASE UR QL STRIP: NEGATIVE
LYMPHOCYTES # BLD AUTO: 0.6 K/UL
LYMPHOCYTES NFR BLD: 10.5 %
MCH RBC QN AUTO: 27.1 PG
MCHC RBC AUTO-ENTMCNC: 33.1 %
MCV RBC AUTO: 82 FL
MONOCYTES # BLD AUTO: 0.6 K/UL
MONOCYTES NFR BLD: 10.3 %
NEUTROPHILS # BLD AUTO: 4.5 K/UL
NEUTROPHILS NFR BLD: 78.3 %
NITRITE UR QL STRIP: NEGATIVE
PH UR STRIP: 6 [PH] (ref 5–8)
PLATELET # BLD AUTO: 140 K/UL
PMV BLD AUTO: 10.3 FL
POTASSIUM SERPL-SCNC: 3.7 MMOL/L
PROT SERPL-MCNC: 7.3 G/DL
PROT UR QL STRIP: NEGATIVE
RBC # BLD AUTO: 5.5 M/UL
SODIUM SERPL-SCNC: 138 MMOL/L
SP GR UR STRIP: 1.01 (ref 1–1.03)
SPECIMEN SOURCE: NORMAL
URN SPEC COLLECT METH UR: NORMAL
UROBILINOGEN UR STRIP-ACNC: 1 EU/DL
WBC # BLD AUTO: 5.71 K/UL

## 2017-03-05 PROCEDURE — 87400 INFLUENZA A/B EACH AG IA: CPT | Mod: 59

## 2017-03-05 PROCEDURE — 93010 ELECTROCARDIOGRAM REPORT: CPT | Mod: ,,, | Performed by: INTERNAL MEDICINE

## 2017-03-05 PROCEDURE — 81003 URINALYSIS AUTO W/O SCOPE: CPT

## 2017-03-05 PROCEDURE — 80053 COMPREHEN METABOLIC PANEL: CPT

## 2017-03-05 PROCEDURE — 96360 HYDRATION IV INFUSION INIT: CPT

## 2017-03-05 PROCEDURE — 25000003 PHARM REV CODE 250: Performed by: EMERGENCY MEDICINE

## 2017-03-05 PROCEDURE — 85025 COMPLETE CBC W/AUTO DIFF WBC: CPT

## 2017-03-05 PROCEDURE — 99284 EMERGENCY DEPT VISIT MOD MDM: CPT | Mod: 25

## 2017-03-05 PROCEDURE — 93005 ELECTROCARDIOGRAM TRACING: CPT

## 2017-03-05 PROCEDURE — 99283 EMERGENCY DEPT VISIT LOW MDM: CPT | Mod: ,,, | Performed by: EMERGENCY MEDICINE

## 2017-03-05 RX ADMIN — SODIUM CHLORIDE, SODIUM LACTATE, POTASSIUM CHLORIDE, AND CALCIUM CHLORIDE 1000 ML: .6; .31; .03; .02 INJECTION, SOLUTION INTRAVENOUS at 09:03

## 2017-03-05 NOTE — DISCHARGE INSTRUCTIONS
Dehydration    The human body is comprised largely of water. If you lose more fluids than you take in, you can become dehydrated. This means there are not enough fluids in your body for it to function right. Mild dehydration can cause weakness, confusion, or muscle cramps. In extreme cases, it can lead to brain damage and even death. That's why prompt treatment is crucial.  Risk factors  Anyone can become dehydrated. But infants, children, and older adults are at greatest risk. You are most likely to lose fluids with severe vomiting, diarrhea, or a fever. Exercising or working hard--especially in hot weather--can also cause excess fluid loss.  What to do  Drinking liquids is the best way to prevent dehydration. Water is best, but juice or frozen pops can also help. Your doctor may suggest electrolyte solutions for sick infants and young children.  When to go to the emergency room (ER)  Go to an ER right away for these symptoms:  Adults  · Very dark urine and little urine output  · Dizziness, weakness, confusion, fainting  Children  · Sunken eyes  · Little or no urine output (for infants, no wet diaper in 8 hours)  · Very dark urine  · Skin that doesn't bounce back quickly when pinched  · Crying without tears  What to expect in the ER  Your blood pressure, temperature, and heart rate will be checked. You may have blood or urine tests. The main treatment for dehydration is fluids. You may be given these to drink. Or, you may receive them through a vein in your arm. You also may be treated for diarrhea, vomiting, or a high fever.   Date Last Reviewed: 7/18/2015  © 5405-6421 [employer]. [address] [place] [address], [place] [address]. All rights reserved. This information is not intended as a substitute for professional medical care. Always follow your healthcare professional's instructions.          Viral Syndrome (Adult)  A viral illness may cause a number of symptoms. The symptoms depend on the part of the  "body that the virus affects. If it settles in your nose, throat, and lungs, it may cause cough, sore throat, congestion, and sometimes headache. If it settles in your stomach and intestinal tract, it may cause vomiting and diarrhea. Sometimes it causes vague symptoms like "aching all over," feeling tired, loss of appetite, or fever.  A viral illness usually lasts 1 to 2 weeks, but sometimes it lasts longer. In some cases, a more serious infection can look like a viral syndrome in the first few days of the illness. You may need another exam and additional tests to know the difference. Watch for the warning signs listed below.  Home care  Follow these guidelines for taking care of yourself at home:  · If symptoms are severe, rest at home for the first 2 to 3 days.  · Stay away from cigarette smoke - both your smoke and the smoke from others.  · You may use over-the-counter acetaminophen or ibuprofen for fever, muscle aching, and headache, unless another medicine was prescribed for this. If you have chronic liver or kidney disease or ever had a stomach ulcer or GI bleeding, talk with your doctor before using these medicines. No one who is younger than 18 and ill with a fever should take aspirin. It may cause severe disease or death.  · Your appetite may be poor, so a light diet is fine. Avoid dehydration by drinking 8 to 12 8-ounce glasses of fluids each day. This may include water; orange juice; lemonade; apple, grape, and cranberry juice; clear fruit drinks; electrolyte replacement and sports drinks; and decaffeinated teas and coffee. If you have been diagnosed with a kidney disease, ask your doctor how much and what types of fluids you should drink to prevent dehydration. If you have kidney disease, drinking too much fluid can cause it build up in the your body and be dangerous to your health.  · Over-the-counter remedies won't shorten the length of the illness but may be helpful for cough, sore throat; and nasal " and sinus congestion. Don't use decongestants if you have high blood pressure.  Follow-up care  Follow up with your healthcare provider if you do not improve over the next week.  Call 911  Get emergency medical care if any of the following occur:  · Convulsion  · Feeling weak, dizzy, or like you are going to faint  · Chest pain, shortness of breath, wheezing, or difficulty breathing  When to seek medical advice  Call your healthcare provider right away if any of these occur:  · Cough with lots of colored sputum (mucus) or blood in your sputum  · Chest pain, shortness of breath, wheezing, or difficulty breathing  · Severe headache; face, neck, or ear pain  · Severe, constant pain in the lower right side of your belly (abdominal)  · Continued vomiting (cant keep liquids down)  · Frequent diarrhea (more than 5 times a day); blood (red or black color) or mucus in diarrhea  · Feeling weak, dizzy, or like you are going to faint  · Extreme thirst  · Fever of 100.4°F (38°C) or higher, or as directed by your healthcare provider  Date Last Reviewed: 9/25/2015  © 5810-5820 [employer]. [address] [place] [address]. All rights reserved. This information is not intended as a substitute for professional medical care. Always follow your healthcare professional's instructions.

## 2017-03-05 NOTE — PROVIDER PROGRESS NOTES - EMERGENCY DEPT.
Encounter Date: 3/5/2017    ED Physician Progress Notes         EKG - STEMI Decision  Initial Reading: No STEMI present.  Response: No Action Needed.

## 2017-03-05 NOTE — ED AVS SNAPSHOT
[facility]  [address]  [facility] [address]               Jordin Allen Jr.   3/5/2017  8:39 AM   ED    Description:  Male : 1945   Department:  [facility]           Your Care was Coordinated By:     Provider Role From To    Zeina Cali MD Attending Provider 17 0911 --      Reason for Visit     Fatigue           Diagnoses this Visit        Comments    Dehydration    -  Primary     Viral illness           ED Disposition     None           To Do List           Follow-up Information     Follow up with APRIL Mccain MD.    Specialty:  Internal Medicine    Why:  As needed    Contact information:     [facility]  [place] [address]  [phone]        [facility]     [facility] -  Assistance  Registered nurses in the [facility] provide clinical advisement, health education, appointment booking, and other advisory services.  Call for this free service at [phone].             Medications           Message regarding Medications     Verify the changes and/or additions to your medication regime listed below are the same as discussed with your clinician today.  If any of these changes or additions are incorrect, please notify your healthcare provider.        These medications were administered today        Dose Freq    lactated ringers bolus 1,000 mL 1,000 mL ED 1 Time    Sig: Inject 1,000 mLs into the vein ED 1 Time.    Class: Normal    Route: Intravenous           Verify that the below list of medications is an accurate representation of the medications you are currently taking.  If none reported, the list may be blank. If incorrect, please contact your healthcare provider. Carry this list with you in case of emergency.           Current Medications     albuterol 90 mcg/actuation inhaler Inhale 2 puffs into the lungs every 4 (four) hours as needed for Wheezing.     "albuterol-ipratropium 2.5mg-0.5mg/3mL (DUO-NEB) 0.5 mg-3 mg(2.5 mg base)/3 mL nebulizer solution INHALE 1 VIAL VIA NEBULIZER EVERY 6 HOURS AS NEEDED    amitriptyline (ELAVIL) 25 MG tablet TAKE 1 TABLET BY MOUTH EVERY DAY    aspirin (ECOTRIN) 81 MG EC tablet Take 81 mg by mouth once.    atorvastatin (LIPITOR) 80 MG tablet TAKE 1 TABLET (80 MG TOTAL) BY MOUTH ONCE DAILY.    BETA-CAROTENE,A, W-C & E/MIN (OCUVITE ORAL) Take by mouth once daily.     DALIRESP 500 mcg Tab TAKE 1 TABLET BY MOUTH EVERY DAY    echinacea 400 mg Cap Take by mouth once daily.     fluticasone (FLONASE) 50 mcg/actuation nasal spray INSTILL 1 SPRAY IN EACH NOSTRIL ONCE DAILY.    hydrochlorothiazide (HYDRODIURIL) 25 MG tablet Take 0.5 tablets (12.5 mg total) by mouth once daily.    lactated ringers bolus 1,000 mL Inject 1,000 mLs into the vein ED 1 Time.    latanoprost 0.005 % ophthalmic solution Place 1 drop into both eyes once daily.     losartan (COZAAR) 100 MG tablet TAKE 1 TABLET BY MOUTH ONCE A DAY    nitroGLYCERIN (NITROSTAT) 0.4 MG SL tablet Place 0.4 mg under the tongue every 5 (five) minutes as needed.    omeprazole (PRILOSEC) 20 MG capsule TAKE 1 CAPSULE BY MOUTH EVERY DAY    predniSONE (DELTASONE) 5 MG tablet Take 1 tab PO every other day.  Take with food.    SPIRIVA WITH HANDIHALER 18 mcg inhalation capsule INHALE 1 CAPSULE WITH INHALATION DEVICE ONCE DAILY    SYMBICORT 160-4.5 mcg/actuation HFAA INHALE 2 PUFFS INTO THE LUNGS EVERY 12 HOURS. RINSE MOUTH AFTER USE           Clinical Reference Information           Your Vitals Were     BP Pulse Temp Resp Height Weight    132/78 96 98.7 °F (37.1 °C) (Oral) 18 5' 9" (1.753 m) 106.6 kg (235 lb)    SpO2 BMI             96% 34.7 kg/m2         Allergies as of 3/5/2017        Reactions    Brilinta [Ticagrelor] Itching    Lisinopril     Other reaction(s): cough    Metoprolol Succinate Rash      Immunizations Administered on Date of Encounter - 3/5/2017     None      ED Micro, Lab, POCT     Start " Ordered       Status Ordering Provider    03/05/17 0924 03/05/17 0926  CBC auto differential  STAT      Final result     03/05/17 0924 03/05/17 0926  Comprehensive metabolic panel  STAT      Final result     03/05/17 0924 03/05/17 0926  Urinalysis - Clean Catch  STAT      Final result     03/05/17 0924 03/05/17 0926  Influenza antigen Nasopharyngeal Swab  STAT      Final result       ED Imaging Orders     Start Ordered       Status Ordering Provider    03/05/17 0924 03/05/17 0926  X-Ray Chest PA And Lateral  1 time imaging      Final result         Discharge Instructions         Dehydration    The human body is comprised largely of water. If you lose more fluids than you take in, you can become dehydrated. This means there are not enough fluids in your body for it to function right. Mild dehydration can cause weakness, confusion, or muscle cramps. In extreme cases, it can lead to brain damage and even death. That's why prompt treatment is crucial.  Risk factors  Anyone can become dehydrated. But infants, children, and older adults are at greatest risk. You are most likely to lose fluids with severe vomiting, diarrhea, or a fever. Exercising or working hard--especially in hot weather--can also cause excess fluid loss.  What to do  Drinking liquids is the best way to prevent dehydration. Water is best, but juice or frozen pops can also help. Your doctor may suggest electrolyte solutions for sick infants and young children.  When to go to the emergency room (ER)  Go to an ER right away for these symptoms:  Adults  · Very dark urine and little urine output  · Dizziness, weakness, confusion, fainting  Children  · Sunken eyes  · Little or no urine output (for infants, no wet diaper in 8 hours)  · Very dark urine  · Skin that doesn't bounce back quickly when pinched  · Crying without tears  What to expect in the ER  Your blood pressure, temperature, and heart rate will be checked. You may have blood or urine tests. The  "main treatment for dehydration is fluids. You may be given these to drink. Or, you may receive them through a vein in your arm. You also may be treated for diarrhea, vomiting, or a high fever.   Date Last Reviewed: 7/18/2015  © 6897-6963 [employer]. [address] [place] [address]. All rights reserved. This information is not intended as a substitute for professional medical care. Always follow your healthcare professional's instructions.          Viral Syndrome (Adult)  A viral illness may cause a number of symptoms. The symptoms depend on the part of the body that the virus affects. If it settles in your nose, throat, and lungs, it may cause cough, sore throat, congestion, and sometimes headache. If it settles in your stomach and intestinal tract, it may cause vomiting and diarrhea. Sometimes it causes vague symptoms like "aching all over," feeling tired, loss of appetite, or fever.  A viral illness usually lasts 1 to 2 weeks, but sometimes it lasts longer. In some cases, a more serious infection can look like a viral syndrome in the first few days of the illness. You may need another exam and additional tests to know the difference. Watch for the warning signs listed below.  Home care  Follow these guidelines for taking care of yourself at home:  · If symptoms are severe, rest at home for the first 2 to 3 days.  · Stay away from cigarette smoke - both your smoke and the smoke from others.  · You may use over-the-counter acetaminophen or ibuprofen for fever, muscle aching, and headache, unless another medicine was prescribed for this. If you have chronic liver or kidney disease or ever had a stomach ulcer or GI bleeding, talk with your doctor before using these medicines. No one who is younger than 18 and ill with a fever should take aspirin. It may cause severe disease or death.  · Your appetite may be poor, so a light diet is fine. Avoid dehydration by drinking 8 to 12 8-ounce glasses of " fluids each day. This may include water; orange juice; lemonade; apple, grape, and cranberry juice; clear fruit drinks; electrolyte replacement and sports drinks; and decaffeinated teas and coffee. If you have been diagnosed with a kidney disease, ask your doctor how much and what types of fluids you should drink to prevent dehydration. If you have kidney disease, drinking too much fluid can cause it build up in the your body and be dangerous to your health.  · Over-the-counter remedies won't shorten the length of the illness but may be helpful for cough, sore throat; and nasal and sinus congestion. Don't use decongestants if you have high blood pressure.  Follow-up care  Follow up with your healthcare provider if you do not improve over the next week.  Call 911  Get emergency medical care if any of the following occur:  · Convulsion  · Feeling weak, dizzy, or like you are going to faint  · Chest pain, shortness of breath, wheezing, or difficulty breathing  When to seek medical advice  Call your healthcare provider right away if any of these occur:  · Cough with lots of colored sputum (mucus) or blood in your sputum  · Chest pain, shortness of breath, wheezing, or difficulty breathing  · Severe headache; face, neck, or ear pain  · Severe, constant pain in the lower right side of your belly (abdominal)  · Continued vomiting (cant keep liquids down)  · Frequent diarrhea (more than 5 times a day); blood (red or black color) or mucus in diarrhea  · Feeling weak, dizzy, or like you are going to faint  · Extreme thirst  · Fever of 100.4°F (38°C) or higher, or as directed by your healthcare provider  Date Last Reviewed: 9/25/2015  © 1210-9185 [employer]. [address] [place] [address], [place] [address]. All rights reserved. This information is not intended as a substitute for professional medical care. Always follow your healthcare professional's instructions.          Your Scheduled Appointments     Mar 07, 2017   8:10 AM CST   Fasting Lab with LAB, APPOINTMENT [place] [facility] ([facility])    [address] [facility] [id number] [phone]            Mar 07, 2017  8:45 AM CST   Diagnostic Xray with [facility] [id number] 485 LB LIMIT   [facility] ([facility] )    [facility] [facility] [address]   [phone]            Mar 07, 2017  9:30 AM CST   EKG with EKG, APPT   [facility] - EKG ([facility] )    [address] [place] [address] [phone]            Mar 07, 2017 10:00 AM CST   Bariatric Nutrition Consult with Yohana Nails   [facility] - Bariatric Surgery ([facility] )    [address] [place] [id number] [phone]            Mar 07, 2017  2:30 PM CST   Established Patient Visit with SERENA Estrada MD   [facility] - Pulmonary Services ([facility] )    [address] [place] [address] [phone]              Smoking Cessation     If you would like to quit smoking:   You may be eligible for free services if you are a [place] resident and started smoking cigarettes before September 1, 1988.  Call the Smoking Cessation Trust ([facility]) toll free at ([phone]) [phone] or (093) [phone].   Call 1-800-QUIT-NOW if you do not meet the above criteria.             [facility] complies with applicable Federal civil rights laws and does not discriminate on the basis of race, color, national origin, age, disability, or sex.        Language Assistance Services     ATTENTION: Language assistance services are available, free of charge. Please call [phone].      ATENCIÓN: Si habla español, tiene a asher disposición servicios gratuitos de asistencia lingüística. Llame al [phone].     CHÚ Ý: N?u b?n nói Ti?ng Vi?t, có các d?ch v? h? tr? ngôn ng? mi?n phí dành cho b?n. G?i s? [phone].

## 2017-03-05 NOTE — ED PROVIDER NOTES
Encounter Date: 3/5/2017    SCRIBE #1 NOTE: I, Woodrow Faye, am scribing for, and in the presence of, Zeina Larsen MD.       History     Chief Complaint   Patient presents with    Fatigue     i worn out from my vacation,hot, cold, feeling bad, both legs and L hip pain     Review of patient's allergies indicates:   Allergen Reactions    Brilinta [ticagrelor] Itching    Lisinopril      Other reaction(s): cough    Metoprolol succinate Rash     HPI Comments: Time seen by provider: 9:14 AM    This is a 71 y.o. male with history of HTN, COPD, SHOLA who presents with complaint of fatigue for the past 5 days, fever and decreased appetite for the past 3 days. He states his last bowel movement was 2 days ago. Pt states spending the past week in [facility], was exhausted after his 2nd day there, also with increased right leg pain as well as fatigue, arriving back in [place] yesterday via car. He reports normal fluid intake. No chest pain or SOB. No cough or other URI symptoms. No dysuria, urinating difficulty, decreased urine. No n/v/d or abdominal pain.     The history is provided by the patient and medical records.     Past Medical History:   Diagnosis Date    Benign neoplasm of colon 10/1/2013    Bronchitis chronic     CAD (coronary artery disease) 10/31/2013    COPD (chronic obstructive pulmonary disease)     Emphysema of lung     GERD (gastroesophageal reflux disease)     Hx of colonic polyps     Hypertension     SHOLA on CPAP     RLS (restless legs syndrome)     Screen for colon cancer 8/30/2013     Past Surgical History:   Procedure Laterality Date    bilateral foot surgery  1993    CARDIAC CATHETERIZATION  2013    x1    CATARACT EXTRACTION, BILATERAL      COLON SURGERY  2013    Ace Mott MD     Family History   Problem Relation Age of Onset    Heart disease Mother     Heart attack Mother     Hypertension Mother     Asthma Neg Hx     Emphysema Neg Hx     Prostate cancer Neg Hx      Social History    Substance Use Topics    Smoking status: Former Smoker     Packs/day: 1.00     Years: 40.00     Types: Cigarettes     Quit date: 8/15/2012    Smokeless tobacco: Never Used    Alcohol use Yes      Comment: social use only     Review of Systems   Constitutional: Positive for appetite change, fatigue and fever.   HENT: Negative for congestion, rhinorrhea and sore throat.    Respiratory: Negative for cough and shortness of breath.    Cardiovascular: Negative for chest pain.   Gastrointestinal: Negative for abdominal pain, diarrhea, nausea and vomiting.   Genitourinary: Negative for decreased urine volume, difficulty urinating and dysuria.   Musculoskeletal: Positive for myalgias (right leg pain). Negative for back pain.   Skin: Negative for color change and rash.   Neurological: Negative for weakness.   Hematological: Does not bruise/bleed easily.       Physical Exam   Initial Vitals   BP Pulse Resp Temp SpO2   03/05/17 0818 03/05/17 0818 03/05/17 0818 03/05/17 0818 03/05/17 0818   132/78 96 18 98.7 °F (37.1 °C) 96 %     Physical Exam    Nursing note and vitals reviewed.  Constitutional: He appears well-developed and well-nourished. No distress.   HENT:   Head: Normocephalic and atraumatic.   Right Ear: External ear normal.   Left Ear: External ear normal.   Nose: Nose normal.   Mouth/Throat: Mucous membranes are dry. No oropharyngeal exudate or posterior oropharyngeal erythema.   He has some whitish mucus in the posterior oropharynx.    Eyes: EOM are normal. Pupils are equal, round, and reactive to light.   Neck: Normal range of motion. Neck supple. No tracheal deviation present. No JVD present.   Cardiovascular: Normal rate, regular rhythm and normal heart sounds. Exam reveals no gallop and no friction rub.    No murmur heard.  Pulmonary/Chest: Breath sounds normal. No stridor. No respiratory distress. He has no wheezes. He has no rhonchi. He has no rales.   Abdominal: Soft. He exhibits no distension. There is no  tenderness.   Musculoskeletal: Normal range of motion. He exhibits tenderness (Mild tenderness in the lasteral right thigh.).   Neurological: He is alert and oriented to person, place, and time. He has normal strength. No cranial nerve deficit or sensory deficit.   Skin: Skin is warm and dry.   Psychiatric: His behavior is normal. Thought content normal.         ED Course   Procedures  Labs Reviewed   CBC W/ AUTO DIFFERENTIAL - Abnormal; Notable for the following:        Result Value    Platelets 140 (*)     Lymph # 0.6 (*)     Gran% 78.3 (*)     Lymph% 10.5 (*)     All other components within normal limits   COMPREHENSIVE METABOLIC PANEL - Abnormal; Notable for the following:     Glucose 125 (*)     BUN, Bld 24 (*)     Total Bilirubin 1.4 (*)     AST 44 (*)     ALT 45 (*)     eGFR if non  55.7 (*)     All other components within normal limits   URINALYSIS   INFLUENZA A AND B ANTIGEN          X-Rays:   Independently Interpreted Readings:   Chest X-Ray: No acute abnormalities. Normal mediastinum. Clear lung fields.      Medical Decision Making:   History:   Old Medical Records: I decided to obtain old medical records.  Old Records Summarized: records from clinic visits.       <> Summary of Records: Pt had a preoperative Cardiology visit on 2/9/2017 prior to gastric sleeve operation. 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 He also has history of HTN, COPD, SHOLA.   Initial Assessment:   71 y.o. male complaining of fatigue, fever, decreased appetite. My initial differential diagnoses include but are not limited to viral syndrome, influenza, dehydration, electrolyte abnormality, new onset DM, acute kidney injury. We will check labs and hydrate. We will check flu swab, UA, chest x-ray.   Independently Interpreted Test(s):   I have ordered and independently interpreted X-rays - see prior notes.  I have ordered and independently interpreted EKG Reading(s) - see summary below       <> Summary of EKG Reading(s):  NSR at 90 with no ST elevation or T-wave changes.   Clinical Tests:   Lab Tests: Ordered and Reviewed  Radiological Study: Reviewed and Ordered  Medical Tests: Reviewed and Ordered            Scribe Attestation:   Scribe #1: I performed the above scribed service and the documentation accurately describes the services I performed. I attest to the accuracy of the note.    Attending Attestation:           Physician Attestation for Scribe:  Physician Attestation Statement for Scribe #1: I, Zeina Larsen MD, reviewed documentation, as scribed by Woodrow Faye in my presence, and it is both accurate and complete.         Attending ED Notes:   11:06 AM. Labs are remarkable for BUN and creatinine of 24 and 1.3. This is up from July 2016 when he was 19 and 0.9. Pt reports feeling much better after 1L of Ringer's lactate. CXR and UA were clear. Influenza swab was negative. I advised pt that his fever is likely due to viral syndrome. I instructed him to take Tylenol for fever and continue fluids to push fluids for hydration. He has an appointment scheduled within a few days but can follow up with his PCP as needed.           ED Course     Clinical Impression:   The primary encounter diagnosis was Dehydration. A diagnosis of Viral illness was also pertinent to this visit.    Disposition:   Disposition: Discharged  Condition: Stable       Zeina Cali MD  03/05/17 [id number]

## 2017-03-05 NOTE — ED NOTES
"The patient came to the ER today with c/o fatigue. Pt states he lost his appetite on Wednesday and was just "worn out" from walking around [place]. Last meal last night. Reports increased thirst. Reports almost 101.0 temperature last night. Reports feeling "hot and cold". Pt had flu shot. Reports "restless leg in right leg" and left hip has been bothering him a lot. CPAP at night. Hx of chronic bronchitis.   "

## 2017-03-06 ENCOUNTER — TELEPHONE (OUTPATIENT)
Dept: BARIATRICS | Facility: CLINIC | Age: 72
End: 2017-03-06

## 2017-03-07 ENCOUNTER — HOSPITAL ENCOUNTER (OUTPATIENT)
Dept: CARDIOLOGY | Facility: CLINIC | Age: 72
Discharge: HOME | End: 2017-03-07
Attending: SURGERY
Payer: MEDICARE

## 2017-03-07 ENCOUNTER — HOSPITAL ENCOUNTER (OUTPATIENT)
Dept: RADIOLOGY | Facility: HOSPITAL | Age: 72
Discharge: HOME | End: 2017-03-07
Attending: SURGERY
Payer: MEDICARE

## 2017-03-07 ENCOUNTER — HOSPITAL ENCOUNTER (OUTPATIENT)
Dept: PULMONOLOGY | Facility: CLINIC | Age: 72
Discharge: HOME | End: 2017-03-07
Payer: MEDICARE

## 2017-03-07 ENCOUNTER — OFFICE VISIT (OUTPATIENT)
Dept: PULMONOLOGY | Facility: CLINIC | Age: 72
End: 2017-03-07
Payer: MEDICARE

## 2017-03-07 ENCOUNTER — CLINICAL SUPPORT (OUTPATIENT)
Dept: BARIATRICS | Facility: CLINIC | Age: 72
End: 2017-03-07
Payer: MEDICARE

## 2017-03-07 VITALS
OXYGEN SATURATION: 96 % | WEIGHT: 239 LBS | HEIGHT: 68 IN | SYSTOLIC BLOOD PRESSURE: 110 MMHG | DIASTOLIC BLOOD PRESSURE: 60 MMHG | BODY MASS INDEX: 36.22 KG/M2 | HEART RATE: 69 BPM | RESPIRATION RATE: 14 BRPM

## 2017-03-07 VITALS — WEIGHT: 239.88 LBS | BODY MASS INDEX: 35.42 KG/M2

## 2017-03-07 DIAGNOSIS — J43.8 OTHER EMPHYSEMA: ICD-10-CM

## 2017-03-07 DIAGNOSIS — Z01.811 PRE-OPERATIVE RESPIRATORY EXAMINATION: ICD-10-CM

## 2017-03-07 DIAGNOSIS — I10 HTN (HYPERTENSION), BENIGN: ICD-10-CM

## 2017-03-07 DIAGNOSIS — J43.2 CENTRILOBULAR EMPHYSEMA: Primary | Chronic | ICD-10-CM

## 2017-03-07 DIAGNOSIS — G47.33 OSA (OBSTRUCTIVE SLEEP APNEA): ICD-10-CM

## 2017-03-07 DIAGNOSIS — I25.10 CORONARY ARTERY DISEASE INVOLVING NATIVE CORONARY ARTERY OF NATIVE HEART WITHOUT ANGINA PECTORIS: Chronic | ICD-10-CM

## 2017-03-07 DIAGNOSIS — Z87.891 HISTORY OF SMOKING 30 OR MORE PACK YEARS: ICD-10-CM

## 2017-03-07 DIAGNOSIS — Z71.3 DIETARY COUNSELING AND SURVEILLANCE: ICD-10-CM

## 2017-03-07 DIAGNOSIS — E66.01 MORBID OBESITY DUE TO EXCESS CALORIES: ICD-10-CM

## 2017-03-07 DIAGNOSIS — K21.9 GASTROESOPHAGEAL REFLUX DISEASE WITHOUT ESOPHAGITIS: ICD-10-CM

## 2017-03-07 LAB
PRE FEV1 FVC: 56
PRE FEV1: 1.68
PRE FVC: 2.98
PREDICTED FEV1 FVC: 79
PREDICTED FEV1: 2.97
PREDICTED FVC: 3.73

## 2017-03-07 PROCEDURE — 99499 UNLISTED E&M SERVICE: CPT | Mod: S$PBB,,, | Performed by: DIETITIAN, REGISTERED

## 2017-03-07 PROCEDURE — 94010 BREATHING CAPACITY TEST: CPT | Mod: 26,S$PBB,, | Performed by: INTERNAL MEDICINE

## 2017-03-07 PROCEDURE — 99214 OFFICE O/P EST MOD 30 MIN: CPT | Mod: 25,S$PBB,, | Performed by: INTERNAL MEDICINE

## 2017-03-07 PROCEDURE — 94729 DIFFUSING CAPACITY: CPT | Mod: 26,S$PBB,, | Performed by: INTERNAL MEDICINE

## 2017-03-07 PROCEDURE — 99213 OFFICE O/P EST LOW 20 MIN: CPT | Mod: PBBFAC,27 | Performed by: INTERNAL MEDICINE

## 2017-03-07 PROCEDURE — 99999 PR PBB SHADOW E&M-EST. PATIENT-LVL I: CPT | Mod: PBBFAC,,, | Performed by: DIETITIAN, REGISTERED

## 2017-03-07 PROCEDURE — 71020 XR CHEST PA AND LATERAL: CPT | Mod: 26,,, | Performed by: RADIOLOGY

## 2017-03-07 PROCEDURE — 99999 PR PBB SHADOW E&M-EST. PATIENT-LVL III: CPT | Mod: PBBFAC,,, | Performed by: INTERNAL MEDICINE

## 2017-03-07 PROCEDURE — 93010 ELECTROCARDIOGRAM REPORT: CPT | Mod: S$PBB,,, | Performed by: INTERNAL MEDICINE

## 2017-03-07 NOTE — MR AVS SNAPSHOT
[facility] - Bariatric Surgery  [address]  [facility] [address]  Phone: [phone]  Fax: [phone]                  Jordin Allen    3/7/2017 10:00 AM   Clinical Support    Description:  Male : 1945   Provider:  Rita Nichols RD   Department:  [facility] - Bariatric Surgery                To Do List           Future Appointments        Provider Department Dept Phone    3/7/2017 1:15 PM PULMONARY FUNCTION [facility] - Pulmonary Lab [phone]    3/7/2017 1:30 PM PULMONARY FUNCTION [facility] Pulmonary Lab [phone]    3/7/2017 2:30 PM SERENA Estrada MD [facility] - Pulmonary Services [phone]    4/3/2017 9:45 AM Yohana Jesu [facility] Bariatric Surgery [phone]      Goals (5 Years of Data)     None      [facility] On Call     [facility] On Call Nurse [facility] Line -  Assistance  Registered nurses in the [facility] On Call Center provide clinical advisement, health education, appointment booking, and other advisory services.  Call for this free service at [phone].             Medications           Message regarding Medications     Verify the changes and/or additions to your medication regime listed below are the same as discussed with your clinician today.  If any of these changes or additions are incorrect, please notify your healthcare provider.             Verify that the below list of medications is an accurate representation of the medications you are currently taking.  If none reported, the list may be blank. If incorrect, please contact your healthcare provider. Carry this list with you in case of emergency.           Current Medications     albuterol 90 mcg/actuation inhaler Inhale 2 puffs into the lungs every 4 (four) hours as needed for Wheezing.    albuterol-ipratropium 2.5mg-0.5mg/3mL (DUO-NEB) 0.5 mg-3 mg(2.5 mg base)/3 mL nebulizer solution INHALE 1 VIAL VIA NEBULIZER EVERY 6 HOURS AS NEEDED    amitriptyline (ELAVIL) 25 MG tablet TAKE 1 TABLET BY MOUTH  EVERY DAY    aspirin (ECOTRIN) 81 MG EC tablet Take 81 mg by mouth once.    atorvastatin (LIPITOR) 80 MG tablet TAKE 1 TABLET (80 MG TOTAL) BY MOUTH ONCE DAILY.    BETA-CAROTENE,A, W-C & E/MIN (OCUVITE ORAL) Take by mouth once daily.     DALIRESP 500 mcg Tab TAKE 1 TABLET BY MOUTH EVERY DAY    echinacea 400 mg Cap Take by mouth once daily.     fluticasone (FLONASE) 50 mcg/actuation nasal spray INSTILL 1 SPRAY IN EACH NOSTRIL ONCE DAILY.    hydrochlorothiazide (HYDRODIURIL) 25 MG tablet Take 0.5 tablets (12.5 mg total) by mouth once daily.    latanoprost 0.005 % ophthalmic solution Place 1 drop into both eyes once daily.     losartan (COZAAR) 100 MG tablet TAKE 1 TABLET BY MOUTH ONCE A DAY    nitroGLYCERIN (NITROSTAT) 0.4 MG SL tablet Place 0.4 mg under the tongue every 5 (five) minutes as needed.    omeprazole (PRILOSEC) 20 MG capsule TAKE 1 CAPSULE BY MOUTH EVERY DAY    predniSONE (DELTASONE) 5 MG tablet Take 1 tab PO every other day.  Take with food.    SPIRIVA WITH HANDIHALER 18 mcg inhalation capsule INHALE 1 CAPSULE WITH INHALATION DEVICE ONCE DAILY    SYMBICORT 160-4.5 mcg/actuation HFAA INHALE 2 PUFFS INTO THE LUNGS EVERY 12 HOURS. RINSE MOUTH AFTER USE           Clinical Reference Information           Your Vitals Were     Weight BMI             108.8 kg (239 lb 13.8 oz) 35.42 kg/m2         Allergies as of 3/7/2017     Brilinta [Ticagrelor]    Lisinopril    Metoprolol Succinate      Immunizations Administered on Date of Encounter - 3/7/2017     None      Instructions    1200- 1500 calorie Sample meal plan  80-120g protein per day.   Protein drinks and bars: 0-4 grams sugar  Drink lots of water throughout the day and exercise!  MENU # 1  Breakfast: 2 eggs, 1 turkey sausage caden, 1 apple  Snack: Atkins bar  Lunch: 2 roll-ups (sliced turkey, low-fat sliced cheese, mustard), 12 baby carrots dipped in 1 Tbsp natural peanut butter  Mid-Day Snack: ¼ cup unsalted almonds, ½ cup fruit  Dinner: 1 chicken thigh  simmered in tomato sauce + 2 Tbsp mozzarella cheese, ½ cup black beans, 1/2 cup steamed carrots  Evening Snack: 1/2 cup grapes with 4 cubes low-fat cheddar cheese   ___________________________________________________  MENU # 2  Breakfast: 200 calorie protein drink  Mid-morning snack : ¼ cup unsalted nuts, medium banana  Lunch: 3oz tuna or chicken salad made with 2 tbsp light bertrand, over salad with tomatoes and cucumbers.   Snack: low-fat cheese stick  Dinner: 3oz hamburger caden, slice of low-fat cheese, 1 cup boiled yellow squash and zucchini.   Snack: 6oz light yogurt  _______________________________________________________  Menu #3  Breakfast: 6oz plain Greek yogurt + fruit (½ banana, ½ cup fruit - pineapple, blueberries, strawberries, peach), may add Splenda to cassie.  Lunch: Grilled  chicken breast w/ slice low-fat pepper suzy cheese, 1/2 cup grilled/baked asparagus and small salad with Salad Spritzer.    Mid-Day snack: 200 calorie protein drink   Dinner: 4oz Grilled fish, ½ cup white beans, ½ cup cooked spinach  Evening Snack: fudgsicle no-sugar-added    Menu # 4  Breakfast: 1 scoop vanilla protein powder + 4oz skim milk + 4oz coffee   Snack: Pure protein bar  Lunch: 2 Lettuce tacos: 3oz seasoned ground turkey wrapped in a Francisco lettuce leaves with 1 Tbsp shredded cheese and dollop low-fat sour cream  Snack: ½ cup cottage cheese, ½ cup pineapple chunks  Dinner: Shrimp omelet: 2 eggs, ½ cup shrimp, green onions, and shredded cheese  ______________________________________________________  Menu #5  Breakfast: 1 cup low-fat cottage cheese, ½ cup peaches (no sugar added)  Snack: 1 apple, 4 cubes cheese  Lunch: 3oz baked pork chop, 1 cup okra and tomato stew  Snack: 1/2 cup black beans + salsa + dollop light sour cream  Dinner: Caprese chicken salad: 3 oz chicken breast, 1oz fresh mozzarella, sliced tomato, 1 Tbsp olive oil, basil  Snack: sugar-free popsicle    Menu #6  Breakfast: ½ cup part-skim ricotta cheese,  2 Tbsp sugar-free strawberry preserves, sprinkle of slivered almonds  Snack: 1 orange  Lunch: 3 oz canned chicken, 1oz shredded cheddar cheese, ½  cup black beans, 2 Tbsp salsa  Snack: 200 calorie Protein drink  Dinner: 4 oz grilled salmon steak, over mixed green salad with 2 tbsp light dressing    Meal Ideas for Regular Bariatric Diet  *Recipes and products available at [email or web address]      Breakfast: (15-20g protein)    - Egg white omelet: 2 egg whites or ½ cup Egg Beaters. (Optional proteins: cheese, shrimp, black beans, chicken, sliced turkey) (Optional veggies: tomatoes, salsa, spinach, mushrooms, onions, green peppers, or small slice avocado)     - Egg and sausage: 1 egg or ¼ cup Egg Beaters (any variety), with 1 caden or 2 links of Turkey sausage or Veggie breakfast sausage (The Daily Voice or [employer])    - Crust-less breakfast quiche: To make a glass pie dish, mix 4oz part skim Ricotta, 1 cup skim milk, and 2 eggs as your base. Add protein: shredded cheese, sliced lean ham or turkey, turkey schmidt/sausage. Add veggies: tomato, onion, green onion, mushroom, green pepper, spinach, etc.    - Yogurt parfait: Mix 1 - 6oz container Dannon Light N Fit vanilla yogurt, with ¼ cup crushed unsalted nuts    - Cottage cheese and fruit: ½ cup part-skim cottage cheese or ricotta cheese topped with fresh fruit or sugar free preserves     - Anais Tate's Vanilla Egg custard* (add 2 Tbsp instant coffee granules to make Cappuccino Custard*)    - Hi-Protein café latte (skim milk, decaf coffee, 1 scoop protein powder). Optional to add Sugar free syrup or extract flavoring.    - Breakfast Lox: spread fat free cream cheese on slices of smoked salmon. Serve over scrambled or egg over easy (sauteed with nonstick cookspray) OR on a cucumber slice    - Eggwhich: Scramble or cook 1 large egg over easy using nonstick cookspray. Place between 2 slices of [place]raltarian schmidt and low fat cheese.     Lunch: (20-30g protein)    - ½ cup  Black bean soup (Homemade or Progresso), with ¼ cup shredded low-fat cheese. Top with chopped tomato or fresh salsa.     - Lean deli turkey breast and low-fat sliced cheese, mustard or light bertrand to moisten, rolled up together, or wrapped in a Francisco lettuce leaf    - Chicken salad made from dinner leftovers, moisten with low-fat salad dressing or light bertrand. Also try leftover salmon, shrimp, tuna or boiled eggs. Serve ½ cup over dark green salad    - Fat-free canned refried beans, topped with ¼ cup shredded low-fat cheese. Top with chopped tomato or fresh salsa.     - Greek salad: Top mixed greens with 1-2oz grilled chicken, tomatoes, red onions, 2-3 kalamata olives, and sprinkle lightly with feta cheese. Spritz with Balsamic vinegar to taste.     - Crust-less lunch quiche: To make a glass pie dish, mix 4oz part skim Ricotta, 1 cup skim milk, and 2 eggs as your base. Add protein: shredded cheese, sliced lean ham or turkey, shrimp, chicken. Add veggies: tomato, onion, green onion, mushroom, green pepper, spinach, artichoke, broccoli, etc.    - Pizza bake: spread a  braulio mireya mushroom with tomato sauce, low-fat shredded mozzarella and turkey pepperoni or Bhutanese schmidt. Add any veggies. Roast for 10-15 minutes, until cheese melted.     - Cucumber crab bites: Spread ¼ cup crab dip (lump crabmeat + light cream cheese and green onions) over sliced cucumber.     - Chicken with light spinach and artichoke dip*: Puree in : 6oz cooked and drained spinach, 2 cloves garlic, 1 can cannelloni beans, ½ cup chopped green onions, 1 can drained artichoke hearts (not marinated in oil), lemon juice and basil. Mix in 2oz chopped up chicken.    Supper: (20-30g protein)    - Serve grilled fish over dark green salad tossed with low-fat dressing, served with grilled asparagus rodriges     - Rotisserie chicken salad: served with sliced strawberries, walnuts, fat-free feta cheese crumbles and 1 tbsp Herreras Own Light  Raspberry Adair Vinaigrette    - Shrimp cocktail: Dip cold boiled shrimp in homemade low-sugar cocktail sauce (1/2 cup Candy One Carb ketchup, 2 tbsp horseradish, 1/4 tsp hot sauce, 1 tsp Worcestershire sauce, 1 tbsp freshly-squeezed lemon juice). Serve with dark green salad, walnuts, and crumbled blue cheese drizzled with olive oil and Balsamic vinegar    - Tuna Melt: Spread tuna salad onto 2 thick slices of tomato. Top with low-fat cheese and broil until cheese is melted. May also be made with chicken salad of shrimp salad. [place] with different types of cheeses.    - Chicken or beef fajitas (no tortilla, rice, beans, chips). Top meat and veggies w/ fresh salsa, fat free sour cream.     - Homemade low-fat Chili using extra lean ground beef or ground turkey. Top with shredded cheese and salsa as desired. May add dollop fat-free sour cream if desired    - Chicken parmesan: Top chicken breast w/ low sugar marinara sauce, mozzarella cheese and bake until chicken reaches 165*.  Serve w/ spaghetti SQUASH or Cymro cut green beans    - Dinner Omelet with shrimp or chicken and onion, green peppers and chives.    - No noodle lasagna: Use sliced zucchini or eggplant in place of noodles.  Layer with part skim ricotta cheese and low sugar meat sauce (use very lean ground beef or ground turkey).    - Mexican chicken bake: Bake chunks of chicken breast or thigh with taco seasoning, Pace brand enchilada sauce, green onions and low-fat cheese. Serve with ¼ cup black beans or fat free refried beans topped with chopped tomatoes or salsa.    - Kashif frozen meatballs, simmered in Classico Marinara sauce. Different flavors of salsa or spaghetti sauce create different dishes! Sprinkle with parmesan cheese. Serve with grilled or steamed veggies, or a dark green salad.    - Simmer boneless skinless chicken thigh chunks in Classico Marinara sauce or roasted salsa until tender with chopped onion, bell pepper, garlic, mushrooms,  spinach, etc.     - Hamburger or veggie burger, without the bun, dressed the way you like. Served with grilled or steamed veggies.    - Eggplant parmesan: Bake slices of eggplant at 350 degrees for 15 minutes. Layer tomato sauce, sliced eggplant and low-fat mozzarella cheese in a baking dish and cover with foil. Bake 30-40 more minutes or until bubbly. Uncover and bake at 400 degrees for about 15 more minutes, or until top is slightly crisp.    - Fish tacos: grilled/baked white fish, wrapped in Francisco lettuce leaf, topped with salsa, shredded low-fat cheese, and light coleslaw.    - Chicken jose: Sprinkle chicken w/ 1 tsp of hidden valley ranch dip mix. Then grill chicken and top with black beans, salsa and 1 tsp fat free sour cream.     - Cauliflower pizza crust: Use cauliflower as crust (see recipe on jessica, no flour!). Top w/ low fat cheese, turkey pepperoni and veggies and bake again    - chicken or turkey crust pizza: use ground chicken or turkey instead of cauliflower, spread in [place] and bake at 350 for about 20-30 minutes(may want to add garlic, black pepper, oregano and other herbs to ground meat mixture).  Remove and top w/ low fat cheese, turkey pepperoni and veggies and bake again for another 10 minutes or until cheese is browned.     Snacks: (100-200 calories; >5g protein)    - 1 low-fat cheese stick with 8 cherry tomatoes or 1 serving fresh fruit  - 4 thin slices fat-free turkey breast and 1 slice low-fat cheese  - 4 thin slices fat-free honey ham with wedge of melon  - 6-8 edamame pods (equivalent to about 1/4 cup edamame without pods).   - 1/4 cup unsalted nuts with ½ cup fruit  - 6-oz container Dannon Light n Fit vanilla yogurt, topped with 1oz unsalted nuts         - apple, celery or baby carrots spread with 2 Tbsp PB2  - apple slices with 1 oz slice low-fat cheese  - Apple slices dipped in 2 Tbsp of PB2  - celery, cucumber, bell pepper or baby carrots dipped in ¼ cup hummus bean spread  or light spinach and artichoke dip (*recipe in lunch section)  - celery, cucumber, baby carrots dipped in high protein greek yogurt (Mix 16 oz plain greek yogurt + 1 packet of hidden valley ranch dip mix)  - Patrick Links Beef Steak - 14g protein! (similar to beef jerky)  - 2 wedges Laughing Cow - Light Herb & Garlic Cheese with sliced cucumber or green bell pepper  - 1/2 cup low-fat cottage cheese with ¼ cup fruit or ¼ cup salsa  - RTD Protein drinks: Atkins, Low Carb Slim Fast, EAS light, Muscle Milk Light, etc.  - Homemade Protein drinks: GNC Soy95, Isopure, Nectar, UNJURY, Whey Gourmet, etc. Mix 1 scoop powder with 8oz skim/1% milk or light soymilk.  - Protein bars: Atkins, EAS, Pure Protein, Think Thin, Detour, etc. Must have 0-4 grams sugar - Read the label.    Takeout Options: No more than twice/week  Deli - Salads (no pasta or rice), meats, cheeses. Roasted chicken. Lox (salmon)    Mexican - Platters which don't include tortillas, chips, or rice. Go easy on the beans. Example: Fajitas without the tortillas. Ask the  not to bring chips to the table if they are too tempting.    Greek - Meat or fish and vegetable, but no bread or rice. Including hummus, baba ganoush, etc, is OK. Most sit-down Greek restaurants can provide you with cucumber slices for dipping instead of yana bread.    Fast Food (Avoid as much as possible) - Salads (no croutons and limit salad dressing to 2 tbsp), grilled chicken sandwich without the bun and ask for no bertrand. Viridianas low fat chili or Taco Bell pintos and cheese.    BBQ - The meats are fine if you ask for sauces on the side, but most of the traditional side dishes are loaded with carbs. Capo slaw, baked beans and BBQ sauce are typically made with sugar.    Chinese - Nothing deep-fried, no rice or noodles. Many Chinese sauces have starch and sugar in them, so you'll have to use your judgement. If you find that these sauces trigger cravings, or cause Dumping, you can ask for the  sauce to be made without sugar or just use soy sauce.           Language Assistance Services     ATTENTION: Language assistance services are available, free of charge. Please call [phone].      ATENCIÓN: Si nii couch, tiene a asher disposición servicios gratuitos de asistencia lingüística. Llame al [phone].     CHÚ Ý: N?u b?n nói Ti?ng Vi?t, có các d?ch v? h? tr? ngôn ng? mi?n phí dành cho b?n. G?i s? [phone].         [facility] complies with applicable Federal civil rights laws and does not discriminate on the basis of race, color, national origin, age, disability, or sex.

## 2017-03-07 NOTE — PROGRESS NOTES
Subjective:       Patient ID: Jordin Allen Jr. is a 71 y.o. male.    Chief Complaint: COPD and Pre-Op Resp Evaluation    HPI Mr. Allen is a 71-year-old former smoker who comes for pulmonary assessment in   anticipation of having bariatric surgery in the next several months.  He feels   that his respiratory symptoms have been stable since his visit here in October.    Following that visit, he reduced his prednisone dose to 5 mg every other day as   planned.  He did not recognize any adverse effect from this change.  His   other respiratory medicines remain the same.  He has not had any apparent   adverse effects related to these.    Mr. Allen is not having any current symptoms to suggest an active respiratory   infection.  He has not had any recent episodes of wheezing.  He is not having   any nighttime respiratory symptoms.    DATA:  I have reviewed the images from a chest x-ray done earlier today.  The   cardiac silhouette is not enlarged.  There are no acute cardiovascular   abnormalities.  Lungs appear overinflated consistent with chronic obstructive   disease.  There are no pulmonary infiltrates or pleural abnormalities.  Today's   x-ray appears stable in comparison to multiple prior films.    Pulmonary function studies done today show the forced vital capacity is 2.98 L,   which is 79% of predicted.  The FEV1 is 1.68 L, or 56% of predicted.  The ratio   of these values is 56%.  The diffusion capacity is 18.1, which is 74% of the   expected value.  When today's study is compared to previous studies dating back   over the past 2-1/2 years, there does not appear to have been any detrimental   change in ventilatory function.  The current study results are consistent with   obstructive lung disease, which is moderate in degree.      CB/ADIA  dd: 03/07/2017 20:06:14 (CST)  td: 03/08/2017 14:20:30 (CST)  Doc ID   #[id number]  Job ID #[id number]    CC:       Review of Systems   Constitutional: Negative for fever and  fatigue.   HENT: Negative for postnasal drip, sinus pressure, voice change and congestion.    Respiratory: Positive for cough, shortness of breath and dyspnea on extertion. Negative for sputum production and wheezing.    Cardiovascular: Negative for chest pain and leg swelling.   Genitourinary: Negative for difficulty urinating.   Musculoskeletal: Negative for arthralgias and back pain.   Skin: Negative for rash.   Gastrointestinal: Positive for acid reflux. Negative for abdominal pain.   Neurological: Negative for dizziness and weakness.   Hematological: Negative for adenopathy.       Objective:      Physical Exam   Constitutional: He is oriented to person, place, and time. He appears well-developed and well-nourished.   HENT:   Head: Normocephalic.   Mouth/Throat: Oropharynx is clear and moist. No oropharyngeal exudate.   Neck: Normal range of motion. Neck supple. No JVD present. No tracheal deviation present. No thyromegaly present.   Cardiovascular: Normal rate, regular rhythm and normal heart sounds.    No murmur heard.  Pulmonary/Chest: Symmetric chest wall expansion. No stridor. He has decreased breath sounds. He has no wheezes. He has no rhonchi. He has no rales. He exhibits no tenderness.   Abdominal: Soft.   Musculoskeletal: He exhibits no edema.   Lymphadenopathy:     He has no cervical adenopathy.   Neurological: He is alert and oriented to person, place, and time.   Skin: Skin is warm and dry. No rash noted. No erythema. Nails show no clubbing.   Psychiatric: He has a normal mood and affect.   Vitals reviewed.    Personal Diagnostic Review    No flowsheet data found.      Assessment:       1. Centrilobular emphysema    2. Coronary artery disease involving native coronary artery of native heart without angina pectoris    3. Obesity, Class II, BMI 35-39.9, with comorbidity    4. Pre-operative respiratory examination        Outpatient Encounter Prescriptions as of 3/7/2017   Medication Sig Dispense Refill     albuterol 90 mcg/actuation inhaler Inhale 2 puffs into the lungs every 4 (four) hours as needed for Wheezing. 1 each 12    albuterol-ipratropium 2.5mg-0.5mg/3mL (DUO-NEB) 0.5 mg-3 mg(2.5 mg base)/3 mL nebulizer solution INHALE 1 VIAL VIA NEBULIZER EVERY 6 HOURS AS NEEDED 180 mL 5    amitriptyline (ELAVIL) 25 MG tablet TAKE 1 TABLET BY MOUTH EVERY DAY 30 tablet 6    aspirin (ECOTRIN) 81 MG EC tablet Take 81 mg by mouth once.      atorvastatin (LIPITOR) 80 MG tablet TAKE 1 TABLET (80 MG TOTAL) BY MOUTH ONCE DAILY. 30 tablet 6    BETA-CAROTENE,A, W-C & E/MIN (OCUVITE ORAL) Take by mouth once daily.       DALIRESP 500 mcg Tab TAKE 1 TABLET BY MOUTH EVERY DAY 30 tablet 6    echinacea 400 mg Cap Take by mouth once daily.       fluticasone (FLONASE) 50 mcg/actuation nasal spray INSTILL 1 SPRAY IN EACH NOSTRIL ONCE DAILY. 16 g 6    hydrochlorothiazide (HYDRODIURIL) 25 MG tablet Take 0.5 tablets (12.5 mg total) by mouth once daily. 15 tablet 11    latanoprost 0.005 % ophthalmic solution Place 1 drop into both eyes once daily.       losartan (COZAAR) 100 MG tablet TAKE 1 TABLET BY MOUTH ONCE A DAY 30 tablet 3    nitroGLYCERIN (NITROSTAT) 0.4 MG SL tablet Place 0.4 mg under the tongue every 5 (five) minutes as needed.      omeprazole (PRILOSEC) 20 MG capsule TAKE 1 CAPSULE BY MOUTH EVERY DAY 30 capsule 10    predniSONE (DELTASONE) 5 MG tablet Take 1 tab PO every other day.  Take with food. 16 tablet 6    SPIRIVA WITH HANDIHALER 18 mcg inhalation capsule INHALE 1 CAPSULE WITH INHALATION DEVICE ONCE DAILY 30 capsule 6    SYMBICORT 160-4.5 mcg/actuation HFAA INHALE 2 PUFFS INTO THE LUNGS EVERY 12 HOURS. RINSE MOUTH AFTER USE 10.2 Inhaler 6     No facility-administered encounter medications on file as of 3/7/2017.      No orders of the defined types were placed in this encounter.    Plan:     Continue present respiratory medications (roles reviewed at today's visit).  Discussed the early role for antibiotics  for treatment of respiratory infection.  Clear for anesthesia and surgery from pulmonary perspective.  Call/return if resp symptoms change between now and surgery.  Otherwise; return visit 6 months.

## 2017-03-07 NOTE — PATIENT INSTRUCTIONS
Continue present respiratory medications (roles reviewed at today's visit).  Discussed the early role for antibiotics for treatment of respiratory infection.  Clear for anesthesia and surgery from pulmonary perspective.  Call/return if resp symptoms change between now and surgery.  Otherwise; return visit 6 months.

## 2017-03-07 NOTE — PATIENT INSTRUCTIONS
1200- 1500 calorie Sample meal plan  80-120g protein per day.   Protein drinks and bars: 0-4 grams sugar  Drink lots of water throughout the day and exercise!  MENU # 1  Breakfast: 2 eggs, 1 turkey sausage caden, 1 apple  Snack: Atkins bar  Lunch: 2 roll-ups (sliced turkey, low-fat sliced cheese, mustard), 12 baby carrots dipped in 1 Tbsp natural peanut butter  Mid-Day Snack: ¼ cup unsalted almonds, ½ cup fruit  Dinner: 1 chicken thigh simmered in tomato sauce + 2 Tbsp mozzarella cheese, ½ cup black beans, 1/2 cup steamed carrots  Evening Snack: 1/2 cup grapes with 4 cubes low-fat cheddar cheese   ___________________________________________________  MENU # 2  Breakfast: 200 calorie protein drink  Mid-morning snack : ¼ cup unsalted nuts, medium banana  Lunch: 3oz tuna or chicken salad made with 2 tbsp light bertrand, over salad with tomatoes and cucumbers.   Snack: low-fat cheese stick  Dinner: 3oz hamburger caden, slice of low-fat cheese, 1 cup boiled yellow squash and zucchini.   Snack: 6oz light yogurt  _______________________________________________________  Menu #3  Breakfast: 6oz plain Greek yogurt + fruit (½ banana, ½ cup fruit - pineapple, blueberries, strawberries, peach), may add Splenda to cassie.  Lunch: Grilled  chicken breast w/ slice low-fat pepper suzy cheese, 1/2 cup grilled/baked asparagus and small salad with Salad Spritzer.    Mid-Day snack: 200 calorie protein drink   Dinner: 4oz Grilled fish, ½ cup white beans, ½ cup cooked spinach  Evening Snack: fudgsicle no-sugar-added    Menu # 4  Breakfast: 1 scoop vanilla protein powder + 4oz skim milk + 4oz coffee   Snack: Pure protein bar  Lunch: 2 Lettuce tacos: 3oz seasoned ground turkey wrapped in a Francisco lettuce leaves with 1 Tbsp shredded cheese and dollop low-fat sour cream  Snack: ½ cup cottage cheese, ½ cup pineapple chunks  Dinner: Shrimp omelet: 2 eggs, ½ cup shrimp, green onions, and shredded  cheese  ______________________________________________________  Menu #5  Breakfast: 1 cup low-fat cottage cheese, ½ cup peaches (no sugar added)  Snack: 1 apple, 4 cubes cheese  Lunch: 3oz baked pork chop, 1 cup okra and tomato stew  Snack: 1/2 cup black beans + salsa + dollop light sour cream  Dinner: Caprese chicken salad: 3 oz chicken breast, 1oz fresh mozzarella, sliced tomato, 1 Tbsp olive oil, basil  Snack: sugar-free popsicle    Menu #6  Breakfast: ½ cup part-skim ricotta cheese, 2 Tbsp sugar-free strawberry preserves, sprinkle of slivered almonds  Snack: 1 orange  Lunch: 3 oz canned chicken, 1oz shredded cheddar cheese, ½  cup black beans, 2 Tbsp salsa  Snack: 200 calorie Protein drink  Dinner: 4 oz grilled salmon steak, over mixed green salad with 2 tbsp light dressing    Meal Ideas for Regular Bariatric Diet  *Recipes and products available at www.bariatriceating.com      Breakfast: (15-20g protein)    - Egg white omelet: 2 egg whites or ½ cup Egg Beaters. (Optional proteins: cheese, shrimp, black beans, chicken, sliced turkey) (Optional veggies: tomatoes, salsa, spinach, mushrooms, onions, green peppers, or small slice avocado)     - Egg and sausage: 1 egg or ¼ cup Egg Beaters (any variety), with 1 caden or 2 links of Turkey sausage or Veggie breakfast sausage (Compliance 11 or Bricsnet)    - Crust-less breakfast quiche: To make a glass pie dish, mix 4oz part skim Ricotta, 1 cup skim milk, and 2 eggs as your base. Add protein: shredded cheese, sliced lean ham or turkey, turkey schmidt/sausage. Add veggies: tomato, onion, green onion, mushroom, green pepper, spinach, etc.    - Yogurt parfait: Mix 1 - 6oz container Dannon Light N Fit vanilla yogurt, with ¼ cup crushed unsalted nuts    - Cottage cheese and fruit: ½ cup part-skim cottage cheese or ricotta cheese topped with fresh fruit or sugar free preserves     - Anais Tate's Vanilla Egg custard* (add 2 Tbsp instant coffee granules to make Cappuccino  Custard*)    - Hi-Protein café latte (skim milk, decaf coffee, 1 scoop protein powder). Optional to add Sugar free syrup or extract flavoring.    - Breakfast Lox: spread fat free cream cheese on slices of smoked salmon. Serve over scrambled or egg over easy (sauteed with nonstick cookspray) OR on a cucumber slice    - Eggwhich: Scramble or cook 1 large egg over easy using nonstick cookspray. Place between 2 slices of Scottish schmidt and low fat cheese.     Lunch: (20-30g protein)    - ½ cup Black bean soup (Homemade or Progresso), with ¼ cup shredded low-fat cheese. Top with chopped tomato or fresh salsa.     - Lean deli turkey breast and low-fat sliced cheese, mustard or light bertrand to moisten, rolled up together, or wrapped in a [place] lettuce leaf    - Chicken salad made from dinner leftovers, moisten with low-fat salad dressing or light bertrand. Also try leftover salmon, shrimp, tuna or boiled eggs. Serve ½ cup over dark green salad    - Fat-free canned refried beans, topped with ¼ cup shredded low-fat cheese. Top with chopped tomato or fresh salsa.     - Greek salad: Top mixed greens with 1-2oz grilled chicken, tomatoes, red onions, 2-3 kalamata olives, and sprinkle lightly with feta cheese. Spritz with Balsamic vinegar to taste.     - Crust-less lunch quiche: To make a glass pie dish, mix 4oz part skim Ricotta, 1 cup skim milk, and 2 eggs as your base. Add protein: shredded cheese, sliced lean ham or turkey, shrimp, chicken. Add veggies: tomato, onion, green onion, mushroom, green pepper, spinach, artichoke, broccoli, etc.    - Pizza bake: spread a  braulio mireya mushroom with tomato sauce, low-fat shredded mozzarella and turkey pepperoni or [place] schmidt. Add any veggies. Roast for 10-15 minutes, until cheese melted.     - Cucumber crab bites: Spread ¼ cup crab dip (lump crabmeat + light cream cheese and green onions) over sliced cucumber.     - Chicken with light spinach and artichoke dip*: Puree in food  processor: 6oz cooked and drained spinach, 2 cloves garlic, 1 can cannelloni beans, ½ cup chopped green onions, 1 can drained artichoke hearts (not marinated in oil), lemon juice and basil. Mix in 2oz chopped up chicken.    Supper: (20-30g protein)    - Serve grilled fish over dark green salad tossed with low-fat dressing, served with grilled asparagus rodriges     - Rotisserie chicken salad: served with sliced strawberries, walnuts, fat-free feta cheese crumbles and 1 tbsp Herreras Own Light Raspberry Romulus Vinaigrette    - Shrimp cocktail: Dip cold boiled shrimp in homemade low-sugar cocktail sauce (1/2 cup Candy One Carb ketchup, 2 tbsp horseradish, 1/4 tsp hot sauce, 1 tsp Worcestershire sauce, 1 tbsp freshly-squeezed lemon juice). Serve with dark green salad, walnuts, and crumbled blue cheese drizzled with olive oil and Balsamic vinegar    - Tuna Melt: Spread tuna salad onto 2 thick slices of tomato. Top with low-fat cheese and broil until cheese is melted. May also be made with chicken salad of shrimp salad. [place] with different types of cheeses.    - Chicken or beef fajitas (no tortilla, rice, beans, chips). Top meat and veggies w/ fresh salsa, fat free sour cream.     - Homemade low-fat Chili using extra lean ground beef or ground turkey. Top with shredded cheese and salsa as desired. May add dollop fat-free sour cream if desired    - Chicken parmesan: Top chicken breast w/ low sugar marinara sauce, mozzarella cheese and bake until chicken reaches 165*.  Serve w/ spaghetti SQUASH or Belarusian cut green beans    - Dinner Omelet with shrimp or chicken and onion, green peppers and chives.    - No noodle lasagna: Use sliced zucchini or eggplant in place of noodles.  Layer with part skim ricotta cheese and low sugar meat sauce (use very lean ground beef or ground turkey).    - Mexican chicken bake: Bake chunks of chicken breast or thigh with taco seasoning, Pace brand enchilada sauce, green onions and low-fat  cheese. Serve with ¼ cup black beans or fat free refried beans topped with chopped tomatoes or salsa.    - Kashif frozen meatballs, simmered in Classico Marinara sauce. Different flavors of salsa or spaghetti sauce create different dishes! Sprinkle with parmesan cheese. Serve with grilled or steamed veggies, or a dark green salad.    - Simmer boneless skinless chicken thigh chunks in Classico Marinara sauce or roasted salsa until tender with chopped onion, bell pepper, garlic, mushrooms, spinach, etc.     - Hamburger or veggie burger, without the bun, dressed the way you like. Served with grilled or steamed veggies.    - Eggplant parmesan: Bake slices of eggplant at 350 degrees for 15 minutes. Layer tomato sauce, sliced eggplant and low-fat mozzarella cheese in a baking dish and cover with foil. Bake 30-40 more minutes or until bubbly. Uncover and bake at 400 degrees for about 15 more minutes, or until top is slightly crisp.    - Fish tacos: grilled/baked white fish, wrapped in Francisco lettuce leaf, topped with salsa, shredded low-fat cheese, and light coleslaw.    - Chicken jose: Sprinkle chicken w/ 1 tsp of hidden valley ranch dip mix. Then grill chicken and top with black beans, salsa and 1 tsp fat free sour cream.     - Cauliflower pizza crust: Use cauliflower as crust (see recipe on jessica, no flour!). Top w/ low fat cheese, turkey pepperoni and veggies and bake again    - chicken or turkey crust pizza: use ground chicken or turkey instead of cauliflower, spread in [place] and [place] and bake at 350 for about 20-30 minutes(may want to add garlic, black pepper, oregano and other herbs to ground meat mixture).  Remove and top w/ low fat cheese, turkey pepperoni and veggies and bake again for another 10 minutes or until cheese is browned.     Snacks: (100-200 calories; >5g protein)    - 1 low-fat cheese stick with 8 cherry tomatoes or 1 serving fresh fruit  - 4 thin slices fat-free turkey breast and 1 slice low-fat  cheese  - 4 thin slices fat-free honey ham with wedge of melon  - 6-8 edamame pods (equivalent to about 1/4 cup edamame without pods).   - 1/4 cup unsalted nuts with ½ cup fruit  - 6-oz container Dannon Light n Fit vanilla yogurt, topped with 1oz unsalted nuts         - apple, celery or baby carrots spread with 2 Tbsp PB2  - apple slices with 1 oz slice low-fat cheese  - Apple slices dipped in 2 Tbsp of PB2  - celery, cucumber, bell pepper or baby carrots dipped in ¼ cup hummus bean spread or light spinach and artichoke dip (*recipe in lunch section)  - celery, cucumber, baby carrots dipped in high protein greek yogurt (Mix 16 oz plain greek yogurt + 1 packet of hidden valley ranch dip mix)  - Patrick Links Beef Steak - 14g protein! (similar to beef jerky)  - 2 wedges Laughing Cow - Light Herb & Garlic Cheese with sliced cucumber or green bell pepper  - 1/2 cup low-fat cottage cheese with ¼ cup fruit or ¼ cup salsa  - RTD Protein drinks: Atkins, Low Carb Slim Fast, EAS light, Muscle Milk Light, etc.  - Homemade Protein drinks: GNC Soy95, Isopure, Nectar, UNJURY, Whey Gourmet, etc. Mix 1 scoop powder with 8oz skim/1% milk or light soymilk.  - Protein bars: Atkins, EAS, Pure Protein, Think Thin, Detour, etc. Must have 0-4 grams sugar - Read the label.    Takeout Options: No more than twice/week  Deli - Salads (no pasta or rice), meats, cheeses. Roasted chicken. Lox (salmon)    Mexican - Platters which don't include tortillas, chips, or rice. Go easy on the beans. Example: Fajitas without the tortillas. Ask the  not to bring chips to the table if they are too tempting.    Greek - Meat or fish and vegetable, but no bread or rice. Including hummus, baba ganoush, etc, is OK. Most sit-down Greek restaurants can provide you with cucumber slices for dipping instead of yana bread.    Fast Food (Avoid as much as possible) - Salads (no croutons and limit salad dressing to 2 tbsp), grilled chicken sandwich without the bun  and ask for no bertrand. Viridianas low fat chili or Taco Bell pintos and cheese.    BBQ - The meats are fine if you ask for sauces on the side, but most of the traditional side dishes are loaded with carbs. Capo slaw, baked beans and BBQ sauce are typically made with sugar.    Chinese - Nothing deep-fried, no rice or noodles. Many Chinese sauces have starch and sugar in them, so you'll have to use your judgement. If you find that these sauces trigger cravings, or cause Dumping, you can ask for the sauce to be made without sugar or just use soy sauce.

## 2017-03-10 ENCOUNTER — TELEPHONE (OUTPATIENT)
Dept: BARIATRICS | Facility: CLINIC | Age: 72
End: 2017-03-10

## 2017-03-10 DIAGNOSIS — R17 ELEVATED BILIRUBIN: ICD-10-CM

## 2017-03-10 DIAGNOSIS — R79.89 ELEVATED LFTS: ICD-10-CM

## 2017-03-10 DIAGNOSIS — D69.6 TEMPORARY LOW PLATELET COUNT: Primary | ICD-10-CM

## 2017-03-10 DIAGNOSIS — E87.6 LOW BLOOD POTASSIUM: Primary | ICD-10-CM

## 2017-03-10 NOTE — TELEPHONE ENCOUNTER
----- Message from Tanya Jensen PA-C sent at 3/10/2017  3:02 PM CST -----  Elevated LFTs, low platelets, and elevated bili, pt notified via [facility], /S abdomen and hepatology referral placed.

## 2017-03-10 NOTE — TELEPHONE ENCOUNTER
Notified patient of results.  Scheduled U/S and hepatology consult.  Discussed low potassium with TRISTA Negrete, and called in prescription.  Repeat lab ordered for 2/15/17

## 2017-03-13 ENCOUNTER — TELEPHONE (OUTPATIENT)
Dept: CARDIOLOGY | Facility: CLINIC | Age: 72
End: 2017-03-13

## 2017-03-13 ENCOUNTER — DOCUMENTATION ONLY (OUTPATIENT)
Dept: TRANSPLANT | Facility: CLINIC | Age: 72
End: 2017-03-13

## 2017-03-13 DIAGNOSIS — E87.6 LOW BLOOD POTASSIUM: Primary | ICD-10-CM

## 2017-03-13 DIAGNOSIS — I25.10 CORONARY ARTERY DISEASE INVOLVING NATIVE HEART WITHOUT ANGINA PECTORIS, UNSPECIFIED VESSEL OR LESION TYPE: Primary | ICD-10-CM

## 2017-03-13 RX ORDER — POTASSIUM CHLORIDE 20 MEQ/1
20 TABLET, EXTENDED RELEASE ORAL DAILY
Qty: 3 TABLET | Refills: 0 | Status: CANCELLED | OUTPATIENT
Start: 2017-03-13 | End: 2017-03-16

## 2017-03-13 NOTE — TELEPHONE ENCOUNTER
Patient is being transitioned from Cardiac Rehab Phase 3 to the [facility].  Please sign orders and call if you have any questions [phone].    Thank you!

## 2017-03-13 NOTE — NURSING
Initial referral received  from the [facility]que.   Referring Provider/diagnosis  Tanya Jensen PA-C    Diagnosis: Temporary low platelet count  Elevated LFTs  Elevated bilirubin     Pending review of ultrasound and labs on  3/15.    Referral letter sent to provider and patient.

## 2017-03-15 ENCOUNTER — OFFICE VISIT (OUTPATIENT)
Dept: INTERNAL MEDICINE | Facility: CLINIC | Age: 72
End: 2017-03-15
Payer: MEDICARE

## 2017-03-15 ENCOUNTER — HOSPITAL ENCOUNTER (OUTPATIENT)
Dept: RADIOLOGY | Facility: HOSPITAL | Age: 72
Discharge: HOME | End: 2017-03-15
Attending: SURGERY
Payer: MEDICARE

## 2017-03-15 VITALS
HEART RATE: 68 BPM | RESPIRATION RATE: 16 BRPM | SYSTOLIC BLOOD PRESSURE: 110 MMHG | HEIGHT: 68 IN | TEMPERATURE: 98 F | DIASTOLIC BLOOD PRESSURE: 80 MMHG | WEIGHT: 238.13 LBS | BODY MASS INDEX: 36.09 KG/M2

## 2017-03-15 DIAGNOSIS — D69.6 TEMPORARY LOW PLATELET COUNT: ICD-10-CM

## 2017-03-15 DIAGNOSIS — R17 ELEVATED BILIRUBIN: ICD-10-CM

## 2017-03-15 DIAGNOSIS — R63.8 DEHYDRATION SYMPTOMS: ICD-10-CM

## 2017-03-15 DIAGNOSIS — E87.6 HYPOKALEMIA: ICD-10-CM

## 2017-03-15 DIAGNOSIS — R53.83 FATIGUE, UNSPECIFIED TYPE: Primary | ICD-10-CM

## 2017-03-15 DIAGNOSIS — R79.89 ELEVATED LFTS: ICD-10-CM

## 2017-03-15 PROCEDURE — 76700 US EXAM ABDOM COMPLETE: CPT | Mod: TC

## 2017-03-15 PROCEDURE — 99213 OFFICE O/P EST LOW 20 MIN: CPT | Mod: S$PBB,,, | Performed by: INTERNAL MEDICINE

## 2017-03-15 PROCEDURE — 76700 US EXAM ABDOM COMPLETE: CPT | Mod: 26,GC,, | Performed by: RADIOLOGY

## 2017-03-15 PROCEDURE — 99999 PR PBB SHADOW E&M-EST. PATIENT-LVL III: CPT | Mod: PBBFAC,,, | Performed by: INTERNAL MEDICINE

## 2017-03-15 RX ORDER — POTASSIUM CHLORIDE 20 MEQ/1
1 TABLET, EXTENDED RELEASE ORAL DAILY
COMMUNITY
Start: 2017-03-10 | End: 2017-03-27

## 2017-03-15 NOTE — MR AVS SNAPSHOT
[place] [facility]   [facility]  [place] [address]  Phone: [phone]  Fax: [phone]                  Jordin Allen JrGreg   3/15/2017 9:40 AM   Office Visit    Description:  Male : 1945   Provider:  Colton Guadarrama MD   Department:  [place] [facility]           Reason for Visit     er fol up           Diagnoses this Visit        Comments    Fatigue, unspecified type    -  Primary     Dehydration symptoms         Hypokalemia                To Do List           Future Appointments        Provider Department Dept Phone    3/15/2017 1:00 PM LAB, APPOINTMENT [place] [facility] [phone]    3/15/2017 2:00 PM [facility] 11 ALL [facility] [phone]    3/27/2017 10:00 AM ORACIO Almanza [facility] - Hepatology [phone]    4/3/2017 9:45 AM Yohana Pino [facility] - Bariatric Surgery [phone]      Goals (5 Years of Data)     None      Follow-Up and Disposition     Return if symptoms worsen or fail to improve.      [facility]     [facility] Care [facility] -  Assistance  Registered nurses in the [facility] provide clinical advisement, health education, appointment booking, and other advisory services.  Call for this free service at [phone].             Medications           Message regarding Medications     Verify the changes and/or additions to your medication regime listed below are the same as discussed with your clinician today.  If any of these changes or additions are incorrect, please notify your healthcare provider.             Verify that the below list of medications is an accurate representation of the medications you are currently taking.  If none reported, the list may be blank. If incorrect, please contact your healthcare provider. Carry this list with you in case of emergency.           Current Medications     albuterol 90 mcg/actuation inhaler Inhale 2 puffs  "into the lungs every 4 (four) hours as needed for Wheezing.    albuterol-ipratropium 2.5mg-0.5mg/3mL (DUO-NEB) 0.5 mg-3 mg(2.5 mg base)/3 mL nebulizer solution INHALE 1 VIAL VIA NEBULIZER EVERY 6 HOURS AS NEEDED    amitriptyline (ELAVIL) 25 MG tablet TAKE 1 TABLET BY MOUTH EVERY DAY    aspirin (ECOTRIN) 81 MG EC tablet Take 81 mg by mouth once.    atorvastatin (LIPITOR) 80 MG tablet TAKE 1 TABLET (80 MG TOTAL) BY MOUTH ONCE DAILY.    BETA-CAROTENE,A, W-C & E/MIN (OCUVITE ORAL) Take by mouth once daily.     DALIRESP 500 mcg Tab TAKE 1 TABLET BY MOUTH EVERY DAY    echinacea 400 mg Cap Take by mouth once daily.     fluticasone (FLONASE) 50 mcg/actuation nasal spray INSTILL 1 SPRAY IN EACH NOSTRIL ONCE DAILY.    hydrochlorothiazide (HYDRODIURIL) 25 MG tablet Take 0.5 tablets (12.5 mg total) by mouth once daily.    latanoprost 0.005 % ophthalmic solution Place 1 drop into both eyes once daily.     losartan (COZAAR) 100 MG tablet TAKE 1 TABLET BY MOUTH ONCE A DAY    nitroGLYCERIN (NITROSTAT) 0.4 MG SL tablet Place 0.4 mg under the tongue every 5 (five) minutes as needed.    omeprazole (PRILOSEC) 20 MG capsule TAKE 1 CAPSULE BY MOUTH EVERY DAY    potassium chloride SA (K-DUR,KLOR-CON) 20 MEQ tablet Take 1 tablet by mouth Daily.    predniSONE (DELTASONE) 5 MG tablet Take 1 tab PO every other day.  Take with food.    SPIRIVA WITH HANDIHALER 18 mcg inhalation capsule INHALE 1 CAPSULE WITH INHALATION DEVICE ONCE DAILY    SYMBICORT 160-4.5 mcg/actuation HFAA INHALE 2 PUFFS INTO THE LUNGS EVERY 12 HOURS. RINSE MOUTH AFTER USE           Clinical Reference Information           Your Vitals Were     BP Pulse Temp Resp Height Weight    110/80 (BP Location: Right arm, Patient Position: Sitting, BP Method: Manual) 68 97.7 °F (36.5 °C) (Oral) 16 5' 8" (1.727 m) 108 kg (238 lb 1.6 oz)    BMI                36.2 kg/m2          Blood Pressure          Most Recent Value    BP  110/80      Allergies as of 3/15/2017     Brilinta [Ticagrelor] "    Lisinopril    Metoprolol Succinate      Immunizations Administered on Date of Encounter - 3/15/2017     None      Instructions    1. Continue all regular meds.  2. Followup as planned on the liver tests.         Language Assistance Services     ATTENTION: Language assistance services are available, free of charge. Please call [phone].      ATENCIÓN: Si nii couch, tiene a asher disposición servicios gratuitos de asistencia lingüística. Llame al [phone].     [facility] Ý: N?u b?n nói Ti?ng Vi?t, có các d?ch v? h? tr? ngôn ng? mi?n phí dành cho b?n. G?i s? [phone].         [facility] complies with applicable Federal civil rights laws and does not discriminate on the basis of race, color, national origin, age, disability, or sex.

## 2017-03-15 NOTE — PROGRESS NOTES
Subjective:       Patient ID: Jordin Allen Jr. is a 71 y.o. male.    Chief Complaint: er fol up (you use to be his primary.  says got exhausted with [place] trip and iv at er has him feeling much better.)    HPI Comments: Patient here for ER followup.  Was seen on 3/5/17 for fatigue, exhaustion and dehydration.  Had just returned from a very tiring trip to [facility] with his family.  Weather was 85 plus degrees.  ER notes read and reviewed with the patient.  He was thought to be dehydrated.  He received IV fluids and felt better.    Since then he has been seen by other specialists in preparation for some Bariatric Surgery.  He has been found to elevated transaminases on his liver tests.  He is being evaluated for that.  We discussed that this may be fatty liver from his obesity.  His potassium was low and it is being replaced.    He feels good today.  Thinks he is back to his usual state of health.    No chest pain, no shortness of breath, no N/V, no diarrhea.    Review of Systems   Constitutional: Negative for chills and fever.   Respiratory: Negative.    Cardiovascular: Negative.    Gastrointestinal: Negative.    Neurological: Negative.        Objective:      Physical Exam   Constitutional: He appears well-developed and well-nourished. No distress.   Cardiovascular: Normal rate, regular rhythm, normal heart sounds and intact distal pulses.    Pulmonary/Chest: Effort normal and breath sounds normal. No respiratory distress.   Abdominal: Soft. Bowel sounds are normal. He exhibits no distension. There is no tenderness.   Skin: Skin is warm and dry. No rash noted.   Good turgor.   Vitals reviewed.      Assessment:       1. Fatigue, unspecified type    2. Dehydration symptoms    3. Hypokalemia        Plan:   1. Continue all regular meds.  2. Proceed with liver evaluation as planned.  3. Followup with regular PCP as needed.

## 2017-03-21 RX ORDER — LOSARTAN POTASSIUM 100 MG/1
TABLET ORAL
Qty: 30 TABLET | Refills: 3 | Status: SHIPPED | OUTPATIENT
Start: 2017-03-21 | End: 2017-08-10 | Stop reason: SDUPTHER

## 2017-03-27 ENCOUNTER — OFFICE VISIT (OUTPATIENT)
Dept: HEPATOLOGY | Facility: CLINIC | Age: 72
End: 2017-03-27
Payer: MEDICARE

## 2017-03-27 ENCOUNTER — LAB VISIT (OUTPATIENT)
Dept: LAB | Facility: HOSPITAL | Age: 72
End: 2017-03-27
Payer: MEDICARE

## 2017-03-27 ENCOUNTER — PROCEDURE VISIT (OUTPATIENT)
Dept: HEPATOLOGY | Facility: CLINIC | Age: 72
End: 2017-03-27
Attending: NURSE PRACTITIONER
Payer: MEDICARE

## 2017-03-27 VITALS
HEART RATE: 81 BPM | HEIGHT: 69 IN | WEIGHT: 238.31 LBS | TEMPERATURE: 96 F | OXYGEN SATURATION: 96 % | DIASTOLIC BLOOD PRESSURE: 69 MMHG | RESPIRATION RATE: 16 BRPM | BODY MASS INDEX: 35.3 KG/M2 | SYSTOLIC BLOOD PRESSURE: 144 MMHG

## 2017-03-27 DIAGNOSIS — K76.0 FATTY LIVER: ICD-10-CM

## 2017-03-27 DIAGNOSIS — I25.10 CORONARY ARTERY DISEASE INVOLVING NATIVE CORONARY ARTERY OF NATIVE HEART WITHOUT ANGINA PECTORIS: Chronic | ICD-10-CM

## 2017-03-27 DIAGNOSIS — E66.09 NON MORBID OBESITY DUE TO EXCESS CALORIES: ICD-10-CM

## 2017-03-27 DIAGNOSIS — D69.6 THROMBOCYTOPENIA: ICD-10-CM

## 2017-03-27 DIAGNOSIS — K76.0 NAFLD (NONALCOHOLIC FATTY LIVER DISEASE): ICD-10-CM

## 2017-03-27 DIAGNOSIS — R74.8 ELEVATED LIVER ENZYMES: Primary | ICD-10-CM

## 2017-03-27 DIAGNOSIS — R74.8 ELEVATED LIVER ENZYMES: ICD-10-CM

## 2017-03-27 LAB
ALBUMIN SERPL BCP-MCNC: 3.7 G/DL
ALP SERPL-CCNC: 113 U/L
ALT SERPL W/O P-5'-P-CCNC: 39 U/L
ANION GAP SERPL CALC-SCNC: 7 MMOL/L
AST SERPL-CCNC: 25 U/L
BASOPHILS # BLD AUTO: 0.04 K/UL
BASOPHILS NFR BLD: 0.5 %
BILIRUB SERPL-MCNC: 0.8 MG/DL
BUN SERPL-MCNC: 23 MG/DL
CALCIUM SERPL-MCNC: 9.9 MG/DL
CHLORIDE SERPL-SCNC: 104 MMOL/L
CO2 SERPL-SCNC: 31 MMOL/L
CREAT SERPL-MCNC: 1 MG/DL
DIFFERENTIAL METHOD: NORMAL
EOSINOPHIL # BLD AUTO: 0.5 K/UL
EOSINOPHIL NFR BLD: 5.4 %
ERYTHROCYTE [DISTWIDTH] IN BLOOD BY AUTOMATED COUNT: 13.8 %
EST. GFR  (AFRICAN AMERICAN): >60 ML/MIN/1.73 M^2
EST. GFR  (NON AFRICAN AMERICAN): >60 ML/MIN/1.73 M^2
FERRITIN SERPL-MCNC: 61 NG/ML
GLUCOSE SERPL-MCNC: 89 MG/DL
HBV CORE AB SERPL QL IA: NEGATIVE
HBV SURFACE AB SER-ACNC: NEGATIVE M[IU]/ML
HBV SURFACE AG SERPL QL IA: NEGATIVE
HCT VFR BLD AUTO: 42.6 %
HEPATITIS A ANTIBODY, IGG: NEGATIVE
HGB BLD-MCNC: 14.5 G/DL
INR PPP: 1
LYMPHOCYTES # BLD AUTO: 2 K/UL
LYMPHOCYTES NFR BLD: 22.7 %
MCH RBC QN AUTO: 27.9 PG
MCHC RBC AUTO-ENTMCNC: 34 %
MCV RBC AUTO: 82 FL
MONOCYTES # BLD AUTO: 0.7 K/UL
MONOCYTES NFR BLD: 7.3 %
NEUTROPHILS # BLD AUTO: 5.6 K/UL
NEUTROPHILS NFR BLD: 63.5 %
PLATELET # BLD AUTO: 159 K/UL
PMV BLD AUTO: 10.2 FL
POTASSIUM SERPL-SCNC: 4.5 MMOL/L
PROT SERPL-MCNC: 7.2 G/DL
PROTHROMBIN TIME: 10.3 SEC
RBC # BLD AUTO: 5.19 M/UL
SODIUM SERPL-SCNC: 142 MMOL/L
WBC # BLD AUTO: 8.86 K/UL

## 2017-03-27 PROCEDURE — 91200 LIVER ELASTOGRAPHY: CPT | Mod: PBBFAC | Performed by: NURSE PRACTITIONER

## 2017-03-27 PROCEDURE — 91200 LIVER ELASTOGRAPHY: CPT | Mod: 26,S$PBB,, | Performed by: NURSE PRACTITIONER

## 2017-03-27 PROCEDURE — 99214 OFFICE O/P EST MOD 30 MIN: CPT | Mod: S$PBB,,, | Performed by: NURSE PRACTITIONER

## 2017-03-27 PROCEDURE — 99999 PR PBB SHADOW E&M-EST. PATIENT-LVL V: CPT | Mod: PBBFAC,,, | Performed by: NURSE PRACTITIONER

## 2017-03-27 NOTE — PROGRESS NOTES
I have personally performed a face to face diagnostic evaluation on this patient. I have reviewed and agree with today's findings and the care plan outlined by Karen Chavez NP      My findings are as follows:  Patient presents with fatty liver on US and mildly elevated LFTs.    - no signs of cirrhosis  - will stage liver disease with fibroscan and labs       he will return to Karen Chavez NP  for follow-up.

## 2017-03-27 NOTE — MR AVS SNAPSHOT
[facility]  [address]  [facility] [address]  Phone: [phone]  Fax: [phone]                  Jordin Allen    3/27/2017 10:00 AM   Office Visit    Description:  Male : 1945   Provider:  Karen Chavez NP   Department:  [facility]           Reason for Visit     Fatty Liver     Elevated Hepatic Enzymes           Diagnoses this Visit        Comments    Elevated liver enzymes    -  Primary     Fatty liver                To Do List           Future Appointments        Provider Department Dept Phone    4/3/2017 9:45 AM Yohana Jesu [facility] B[facility] [phone]      Goals (5 Years of Data)     None      [facility]     [facility] -  Assistance  Registered nurses in the [facility] provide clinical advisement, health education, appointment booking, and other advisory services.  Call for this free service at [phone].             Medications           Message regarding Medications     Verify the changes and/or additions to your medication regime listed below are the same as discussed with your clinician today.  If any of these changes or additions are incorrect, please notify your healthcare provider.        STOP taking these medications     potassium chloride SA (K-DUR,KLOR-CON) 20 MEQ tablet Take 1 tablet by mouth Daily.           Verify that the below list of medications is an accurate representation of the medications you are currently taking.  If none reported, the list may be blank. If incorrect, please contact your healthcare provider. Carry this list with you in case of emergency.           Current Medications     albuterol 90 mcg/actuation inhaler Inhale 2 puffs into the lungs every 4 (four) hours as needed for Wheezing.    albuterol-ipratropium 2.5mg-0.5mg/3mL (DUO-NEB) 0.5 mg-3 mg(2.5 mg base)/3 mL nebulizer solution INHALE 1 VIAL VIA NEBULIZER EVERY 6 HOURS AS NEEDED    amitriptyline  "(ELAVIL) 25 MG tablet TAKE 1 TABLET BY MOUTH EVERY DAY    aspirin (ECOTRIN) 81 MG EC tablet Take 81 mg by mouth once.    atorvastatin (LIPITOR) 80 MG tablet TAKE 1 TABLET (80 MG TOTAL) BY MOUTH ONCE DAILY.    BETA-CAROTENE,A, W-C & E/MIN (OCUVITE ORAL) Take by mouth once daily.     DALIRESP 500 mcg Tab TAKE 1 TABLET BY MOUTH EVERY DAY    echinacea 400 mg Cap Take by mouth once daily.     fluticasone (FLONASE) 50 mcg/actuation nasal spray INSTILL 1 SPRAY IN EACH NOSTRIL ONCE DAILY.    hydrochlorothiazide (HYDRODIURIL) 25 MG tablet Take 0.5 tablets (12.5 mg total) by mouth once daily.    latanoprost 0.005 % ophthalmic solution Place 1 drop into both eyes once daily.     losartan (COZAAR) 100 MG tablet TAKE 1 TABLET BY MOUTH ONCE A DAY    nitroGLYCERIN (NITROSTAT) 0.4 MG SL tablet Place 0.4 mg under the tongue every 5 (five) minutes as needed.    omeprazole (PRILOSEC) 20 MG capsule TAKE 1 CAPSULE BY MOUTH EVERY DAY    predniSONE (DELTASONE) 5 MG tablet Take 1 tab PO every other day.  Take with food.    SPIRIVA WITH HANDIHALER 18 mcg inhalation capsule INHALE 1 CAPSULE WITH INHALATION DEVICE ONCE DAILY    SYMBICORT 160-4.5 mcg/actuation HFAA INHALE 2 PUFFS INTO THE LUNGS EVERY 12 HOURS. RINSE MOUTH AFTER USE           Clinical Reference Information           Your Vitals Were     BP Pulse Temp Resp Height Weight    144/69 (BP Location: Left arm, Patient Position: Sitting, BP Method: Automatic) 81 96.2 °F (35.7 °C) (Oral) 16 5' 9" (1.753 m) 108.1 kg (238 lb 5.1 oz)    SpO2 BMI             96% 35.19 kg/m2         Blood Pressure          Most Recent Value    BP  (!)  144/69      Allergies as of 3/27/2017     Brilinta [Ticagrelor]    Lisinopril    Metoprolol Succinate      Immunizations Administered on Date of Encounter - 3/27/2017     None      Orders Placed During Today's Visit     Future Labs/Procedures Expected by Expires    Hepatitis A antibody, IgG  3/27/2017 5/26/2018    US Elastography Liver  3/27/2017 3/27/2018    " CBC auto differential  As directed 3/27/2018    Comprehensive metabolic panel  As directed 3/27/2018    Ferritin  As directed 3/27/2018    Hepatitis B core antibody, total  As directed 3/27/2018    Hepatitis B surface antibody  As directed 3/27/2018    Hepatitis B surface antigen  As directed 3/27/2018    Protime-INR  As directed 3/27/2018      Language Assistance Services     ATTENTION: Language assistance services are available, free of charge. Please call [phone].      ATENCIÓN: Si habla español, tiene a asher disposición servicios gratuitos de asistencia lingüística. Llame al [phone].     CHÚ Ý: N?u b?n nói Ti?ng Vi?t, có các d?ch v? h? tr? ngôn ng? mi?n phí dành cho b?n. G?i s? [phone].         [facility] complies with applicable Federal civil rights laws and does not discriminate on the basis of race, color, national origin, age, disability, or sex.

## 2017-03-27 NOTE — PROGRESS NOTES
[facility] VISIT NEW PT NOTE    REFERRING PROVIDER: Tanya Jensen PA-C    CHIEF COMPLAINT: elevated liver enzymes, fatty liver    HPI: This is a 71 y.o. White male with PMH as below referred for fatty liver and elevated liver enzymes. He was seen in ER on 3/5/17 for dehydration and his transaminases and Tbili were mildly elevated. Previously they have been relatively normal in 20-40's. He had 2 sets of repeat labs and they have remained mildy elevated. He had u/s that showed mildly enlarged liver at 17.4 cm with steatosis. Spleen noted to be normal size but measured 13.1 cm. No ascites. He is undergoing bariatric workup for gastric sleeve. He reports he has always been overweight. He does not have DM or HTN but does have CAD and HLD. He denies any current or h/o excessive alcohol intake. No family h/o liver disease. He had low plts on last 2 sets of labs but they have been normal prior to this except for one episode in 2012. INR nl at 1.0 in 2014 when last checked. Alb is nl but < 4. He denies any symptoms of advanced chronic liver disease including  jaundice, dark urine, abdominal distention, hematemesis, melena, slowed mentation.        Review of patient's allergies indicates:   Allergen Reactions    Brilinta [ticagrelor] Itching    Lisinopril      Other reaction(s): cough    Metoprolol succinate Rash       Medications reviewed in Epic    PMHX:  has a past medical history of Benign neoplasm of colon (10/1/2013); Bronchitis chronic; CAD (coronary artery disease) (10/31/2013); COPD (chronic obstructive pulmonary disease); Emphysema of lung; GERD (gastroesophageal reflux disease); colonic polyps; Hypertension; SHOLA on CPAP; RLS (restless legs syndrome); and Screen for colon cancer (8/30/2013).    PSHX:  has a past surgical history that includes Cataract extraction, bilateral; bilateral foot surgery (1993); Colon surgery (2013); and Cardiac catheterization (2013).    FAMILY HISTORY: Negative for  "liver disease, reviewed in [facility]    SOCIAL HISTORY:   History   Smoking Status    Former Smoker    Packs/day: 1.00    Years: 40.00    Types: Cigarettes    Quit date: 8/15/2012   Smokeless Tobacco    Never Used       History   Alcohol Use    Yes     Comment: social use only       History   Drug Use No         ROS:   GENERAL: Denies fever, chills, weight loss/gain, (+) fatigue  HEENT: Denies headaches, dizziness, vision/hearing changes  CARDIOVASCULAR: Denies chest pain, palpitations, or edema  RESPIRATORY: (+) dyspnea, no cough  GI: Denies abdominal pain, rectal bleeding, nausea, vomiting. No change in bowel pattern or color  : Denies dysuria, hematuria   SKIN: Denies rash, itching   NEURO: Denies confusion, memory loss, or mood changes  PSYCH: Denies depression or anxiety  HEME/LYMPH: Denies easy bruising or bleeding        PHYSICAL EXAM:   Friendly White male, in no acute distress; alert and oriented to person, place and time  VITALS: BP (!) 144/69 (BP Location: Left arm, Patient Position: Sitting, BP Method: Automatic)  Pulse 81  Temp 96.2 °F (35.7 °C) (Oral)   Resp 16  Ht 5' 9" (1.753 m)  Wt 108.1 kg (238 lb 5.1 oz)  SpO2 96%  BMI 35.19 kg/m2  HENT: Normocephalic, without obvious abnormality. Oral mucosa pink and moist. Dentition good.  EYES: Sclerae anicteric. No conjunctival pallor.   NECK: Supple. No masses or cervical adenopathy.  CARDIOVASCULAR: Regular rate and rhythm. No murmurs.  RESPIRATORY: Normal respiratory effort. BBS CTA. No wheezes or crackles.  GI: Soft, non-tender, non-distended. No hepatosplenomegaly. No masses palpable. No ascites.  EXTREMITIES:  No clubbing, cyanosis or edema.  SKIN: Warm and dry. No jaundice. No rashes noted to exposed skin. No telangectasias noted. No palmar erythema.  NEURO:  Normal gate. No asterixis.  PSYCH:  Memory intact. Thought and speech pattern appropriate. Behavior normal. No depression or anxiety noted.      RECENT LABS:    Labs:  Lab Results "   Component Value Date    WBC 5.37 03/07/2017    HGB 12.9 (L) 03/07/2017    HCT 38.1 (L) 03/07/2017     (L) 03/07/2017    CHOL 91 (L) 03/07/2017    TRIG 111 03/07/2017    HDL 24 (L) 03/07/2017     03/15/2017    K 4.3 03/15/2017    CREATININE 1.0 03/15/2017    ALT 47 (H) 03/15/2017    AST 26 03/15/2017    ALKPHOS 101 03/15/2017    BILITOT 1.1 (H) 03/15/2017    ALBUMIN 3.7 03/15/2017    INR 1.0 01/04/2014       DIAGNOSTIC STUDIES:  ABD. U/S- 3/15/17  Findings:    Pancreas:  Normal.     Aorta:  No aneurysm.      Inferior vena cava:  Normal in appearance.    Gallbladder:  No evidence of calculi.  The common duct is not dilated, measuring 2 mm.  No gallbladder wall thickening.No sonographic Magana sign.  No dilated intrahepatic radicles are seen.  No pericholecystic fluid.    Liver:  Mildly enlarged measuring 17.4 cm.  The liver demonstrates heterogeneous hyperechogenicity with elevated hepatorenal index at 2.09.  No focal hepatic lesions are seen.    Right kidney:  Normal in size with no hydronephrosis.      Left kidney:  Normal in size with no hydronephrosis.      Spleen:  Normal in size measuring 13.1 x 4.2 cm with a homogeneous echotexture.    Miscellaneous:  No ascites.   Impression       Mild hepatomegaly with hepatic steatosis.         ASSESSMENT:  71 y.o. White male with:  1.  NAFLD  -- transaminases normal to minimally elevated  -- US shows hepatomegaly with steatosis  -- synthetic liver function nl  -- risk factors for fatty liver include obesity, CAD, HLD  2. Elevated liver enzymes  -- transaminases mildly elevated on past 3 labs but pt was seen in ER on 3/5 for dehydration so could be transient  3. Thrombocytopenia, appears transient decrease but will repeat today  -- spleen 13.1  -- no obvious findings of cirrhosis on workup so far      EDUCATION:   We discussed the manifestations of NAFLD. At this time there is no FDA approved therapy for NAFLD.   The patient and I discussed the importance of  diet, exercise, and weight loss for management of NAFLD. We discussed a low fat, low carb/low sugar, high fiber diet, and a goal of 30 minutes of exercise 5 times per week.   Pt is aware that fatty liver can progress to steatohepatitis and possibly to cirrhosis, putting one at increased risk for liver cancer, liver failure, and death.        PLAN:  1. Labs today for repeat lft's, hep B, hep A/B immunity  2. Fibroscan to assess fibrosis  3. Will provide clearance once above reviewed. May need liver biopsy with surgery  4. Weight loss goal of 15-20 lbs  5. Low fat, low cholesterol, low carb, high fiber diet  6. Exercise, 5 days a week, 30 min a day  7. Recommend good control of cholesterol, blood pressure, blood sugar levels  8. Follow up pending above         Thank you for allowing me to participate in the care of oJrdin Chavez, NP-C    Addendum: Fibroscan done today in clinic, F1-F2. No cirrhosis. Discussed results with pt. Will await other labs to provide clearance. Request liver biopsy be done with surgery to stage fibrosis as well. RICK Jensen PA-C notified.

## 2017-03-27 NOTE — LETTER
March 27, 2017      Tanya Jensen PA-C  [address] [facility] [address]           [facility]at[facility]  [address]  [place] [address]  Phone: [phone]  Fax: [phone]          Patient: Jordin Allen Jr.   MR Number: [id number]   YOB: 1945   Date of Visit: 3/27/2017       Dear Tanya Jensen:    Thank you for referring Jordin Allen to me for evaluation. Attached you will find relevant portions of my assessment and plan of care.    If you have questions, please do not hesitate to call me. I look forward to following Jordin Allen along with you.    Sincerely,    Karen Chavez, ORACIO    Enclosure  CC:  No Recipients    If you would like to receive this communication electronically, please contact [email or web address] or [phone] to request more information on [facility] Link access.    For providers and/or their staff who would like to refer a patient to [facility], please contact us through our one-stop-shop provider referral line, [facility], at [phone].    If you feel you have received this communication in error or would no longer like to receive these types of communications, please e-mail [email or web address]

## 2017-03-28 ENCOUNTER — PATIENT MESSAGE (OUTPATIENT)
Dept: HEPATOLOGY | Facility: CLINIC | Age: 72
End: 2017-03-28

## 2017-03-28 ENCOUNTER — TELEPHONE (OUTPATIENT)
Dept: HEPATOLOGY | Facility: CLINIC | Age: 72
End: 2017-03-28

## 2017-03-28 DIAGNOSIS — K76.0 FATTY LIVER: Primary | ICD-10-CM

## 2017-03-28 NOTE — TELEPHONE ENCOUNTER
Pt cleared for bariatric surgery from hepatology perspective. Needs liver biopsy with surgery. RICK Jensen PA-C notified.

## 2017-03-28 NOTE — PROCEDURES
Procedures Fibroscan Procedure     Name: Jordin Allen Jr.  Date of Procedure : 2017   :: Karen Chavez NP  Diagnosis: NAFLD  Probe: XL    Fibroscan readin.3 KPa    IQR/med:10 %    Fibrosis:F1/F2

## 2017-03-28 NOTE — TELEPHONE ENCOUNTER
----- Message from Isabella Medrano PharmD sent at 3/28/2017  3:15 PM CDT -----  Regarding: twinrix  Hello,     Twinrix is covered by the patients insurance $0!    Thanks, Isabella

## 2017-03-28 NOTE — TELEPHONE ENCOUNTER
MA spoke with patient, message given. Series of 3 vaccines. Pt stated he will start on 04/03/2017.

## 2017-03-28 NOTE — TELEPHONE ENCOUNTER
Please call pt to inform him to get his Twinrix vaccines in pharmacy for $0. He just needs to go to the pharmacy here to start them.

## 2017-03-30 ENCOUNTER — PATIENT MESSAGE (OUTPATIENT)
Dept: HEPATOLOGY | Facility: CLINIC | Age: 72
End: 2017-03-30

## 2017-04-03 ENCOUNTER — CLINICAL SUPPORT (OUTPATIENT)
Dept: BARIATRICS | Facility: CLINIC | Age: 72
End: 2017-04-03
Payer: MEDICARE

## 2017-04-03 VITALS — WEIGHT: 242.31 LBS | BODY MASS INDEX: 35.78 KG/M2

## 2017-04-03 DIAGNOSIS — I10 HTN (HYPERTENSION), BENIGN: ICD-10-CM

## 2017-04-03 DIAGNOSIS — G47.33 OSA (OBSTRUCTIVE SLEEP APNEA): ICD-10-CM

## 2017-04-03 DIAGNOSIS — E66.09 NON MORBID OBESITY DUE TO EXCESS CALORIES: ICD-10-CM

## 2017-04-03 DIAGNOSIS — Z71.3 DIETARY COUNSELING AND SURVEILLANCE: ICD-10-CM

## 2017-04-03 PROCEDURE — 97803 MED NUTRITION INDIV SUBSEQ: CPT | Mod: PBBFAC | Performed by: DIETITIAN, REGISTERED

## 2017-04-03 PROCEDURE — 99211 OFF/OP EST MAY X REQ PHY/QHP: CPT | Mod: PBBFAC | Performed by: DIETITIAN, REGISTERED

## 2017-04-03 PROCEDURE — 99499 UNLISTED E&M SERVICE: CPT | Mod: S$PBB,,, | Performed by: DIETITIAN, REGISTERED

## 2017-04-03 PROCEDURE — 99999 PR PBB SHADOW E&M-EST. PATIENT-LVL I: CPT | Mod: PBBFAC,,, | Performed by: DIETITIAN, REGISTERED

## 2017-04-03 NOTE — PATIENT INSTRUCTIONS
Focus Goals:  Continue with exercise  Eliminate starchy CHO (rice, pasta, potatoes, bread, oatmeal, tortillas, crackers, cereals)  Correct portion sizes (3 oz protein, 1/2 cup cooked vegetables, 1/2 cup fruit, 8 oz milk)  Protein with meals and snacks ( gms per day)    1200- 1500 calorie Sample meal plan  80-120g protein per day.   Protein drinks and bars: 0-4 grams sugar  Drink lots of water throughout the day and exercise!  MENU # 1  Breakfast: 2 eggs, 1 turkey sausage caden, 1 apple  Snack: Atkins bar  Lunch: 2 roll-ups (sliced turkey, low-fat sliced cheese, mustard), 12 baby carrots dipped in 1 Tbsp natural peanut butter  Mid-Day Snack: ¼ cup unsalted almonds, ½ cup fruit  Dinner: 1 chicken thigh simmered in tomato sauce + 2 Tbsp mozzarella cheese, ½ cup black beans, 1/2 cup steamed carrots  Evening Snack: 1/2 cup grapes with 4 cubes low-fat cheddar cheese   ___________________________________________________  MENU # 2  Breakfast: 200 calorie protein drink  Mid-morning snack : ¼ cup unsalted nuts, medium banana  Lunch: 3oz tuna or chicken salad made with 2 tbsp light bertrand, over salad with tomatoes and cucumbers.   Snack: low-fat cheese stick  Dinner: 3oz hamburger caden, slice of low-fat cheese, 1 cup boiled yellow squash and zucchini.   Snack: 6oz light yogurt  _______________________________________________________  Menu #3  Breakfast: 6oz plain Greek yogurt + fruit (½ banana, ½ cup fruit - pineapple, blueberries, strawberries, peach), may add Splenda to cassie.  Lunch: Grilled  chicken breast w/ slice low-fat pepper suzy cheese, 1/2 cup grilled/baked asparagus and small salad with Salad Spritzer.    Mid-Day snack: 200 calorie protein drink   Dinner: 4oz Grilled fish, ½ cup white beans, ½ cup cooked spinach  Evening Snack: fudgsicle no-sugar-added    Menu # 4  Breakfast: 1 scoop vanilla protein powder + 4oz skim milk + 4oz coffee   Snack: Pure protein bar  Lunch: 2 Lettuce tacos: 3oz seasoned ground  turkey wrapped in a [place] lettuce leaves with 1 Tbsp shredded cheese and dollop low-fat sour cream  Snack: ½ cup cottage cheese, ½ cup pineapple chunks  Dinner: Shrimp omelet: 2 eggs, ½ cup shrimp, green onions, and shredded cheese  ______________________________________________________  Menu #5  Breakfast: 1 cup low-fat cottage cheese, ½ cup peaches (no sugar added)  Snack: 1 apple, 4 cubes cheese  Lunch: 3oz baked pork chop, 1 cup okra and tomato stew  Snack: 1/2 cup black beans + salsa + dollop light sour cream  Dinner: Caprese chicken salad: 3 oz chicken breast, 1oz fresh mozzarella, sliced tomato, 1 Tbsp olive oil, basil  Snack: sugar-free popsicle    Menu #6  Breakfast: ½ cup part-skim ricotta cheese, 2 Tbsp sugar-free strawberry preserves, sprinkle of slivered almonds  Snack: 1 orange  Lunch: 3 oz canned chicken, 1oz shredded cheddar cheese, ½  cup black beans, 2 Tbsp salsa  Snack: 200 calorie Protein drink  Dinner: 4 oz grilled salmon steak, over mixed green salad with 2 tbsp light dressing    Meal Ideas for Regular Bariatric Diet  *Recipes and products available at [email or web address]      Breakfast: (15-20g protein)    - Egg white omelet: 2 egg whites or ½ cup Egg Beaters. (Optional proteins: cheese, shrimp, black beans, chicken, sliced turkey) (Optional veggies: tomatoes, salsa, spinach, mushrooms, onions, green peppers, or small slice avocado)     - Egg and sausage: 1 egg or ¼ cup Egg Beaters (any variety), with 1 caden or 2 links of Turkey sausage or Veggie breakfast sausage ([place] or Boca)    - Crust-less breakfast quiche: To make a glass pie dish, mix 4oz part skim Ricotta, 1 cup skim milk, and 2 eggs as your base. Add protein: shredded cheese, sliced lean ham or turkey, turkey schmidt/sausage. Add veggies: tomato, onion, green onion, mushroom, green pepper, spinach, etc.    - Yogurt parfait: Mix 1 - 6oz container Dannon Light N Fit vanilla yogurt, with ¼ cup crushed unsalted  nuts    - Cottage cheese and fruit: ½ cup part-skim cottage cheese or ricotta cheese topped with fresh fruit or sugar free preserves     - Anais Tate's Vanilla Egg custard* (add 2 Tbsp instant coffee granules to make Cappuccino Custard*)    - Hi-Protein café latte (skim milk, decaf coffee, 1 scoop protein powder). Optional to add Sugar free syrup or extract flavoring.    - Breakfast Lox: spread fat free cream cheese on slices of smoked salmon. Serve over scrambled or egg over easy (sauteed with nonstick cookspray) OR on a cucumber slice    - Eggwhich: Scramble or cook 1 large egg over easy using nonstick cookspray. Place between 2 slices of [place] schmidt and low fat cheese.     Lunch: (20-30g protein)    - ½ cup Black bean soup (Homemade or Progresso), with ¼ cup shredded low-fat cheese. Top with chopped tomato or fresh salsa.     - Lean deli turkey breast and low-fat sliced cheese, mustard or light bertrand to moisten, rolled up together, or wrapped in a [place] lettuce leaf    - Chicken salad made from dinner leftovers, moisten with low-fat salad dressing or light bertrand. Also try leftover salmon, shrimp, tuna or boiled eggs. Serve ½ cup over dark green salad    - Fat-free canned refried beans, topped with ¼ cup shredded low-fat cheese. Top with chopped tomato or fresh salsa.     - Greek salad: Top mixed greens with 1-2oz grilled chicken, tomatoes, red onions, 2-3 kalamata olives, and sprinkle lightly with feta cheese. Spritz with Balsamic vinegar to taste.     - Crust-less lunch quiche: To make a glass pie dish, mix 4oz part skim Ricotta, 1 cup skim milk, and 2 eggs as your base. Add protein: shredded cheese, sliced lean ham or turkey, shrimp, chicken. Add veggies: tomato, onion, green onion, mushroom, green pepper, spinach, artichoke, broccoli, etc.    - Pizza bake: spread a  braulio mireya mushroom with tomato sauce, low-fat shredded mozzarella and turkey pepperoni or [place] schmidt. Add any veggies. Roast for 10-15  minutes, until cheese melted.     - Cucumber crab bites: Spread ¼ cup crab dip (lump crabmeat + light cream cheese and green onions) over sliced cucumber.     - Chicken with light spinach and artichoke dip*: Puree in : 6oz cooked and drained spinach, 2 cloves garlic, 1 can cannelloni beans, ½ cup chopped green onions, 1 can drained artichoke hearts (not marinated in oil), lemon juice and basil. Mix in 2oz chopped up chicken.    Supper: (20-30g protein)    - Serve grilled fish over dark green salad tossed with low-fat dressing, served with grilled asparagus rodriges     - Rotisserie chicken salad: served with sliced strawberries, walnuts, fat-free feta cheese crumbles and 1 tbsp Herreras Own Light Raspberry [place] Vinaigrette    - Shrimp cocktail: Dip cold boiled shrimp in homemade low-sugar cocktail sauce (1/2 cup Candy One Carb ketchup, 2 tbsp horseradish, 1/4 tsp hot sauce, 1 tsp Worcestershire sauce, 1 tbsp freshly-squeezed lemon juice). Serve with dark green salad, walnuts, and crumbled blue cheese drizzled with olive oil and Balsamic vinegar    - Tuna Melt: Spread tuna salad onto 2 thick slices of tomato. Top with low-fat cheese and broil until cheese is melted. May also be made with chicken salad of shrimp salad. [place] with different types of cheeses.    - Chicken or beef fajitas (no tortilla, rice, beans, chips). Top meat and veggies w/ fresh salsa, fat free sour cream.     - Homemade low-fat Chili using extra lean ground beef or ground turkey. Top with shredded cheese and salsa as desired. May add dollop fat-free sour cream if desired    - Chicken parmesan: Top chicken breast w/ low sugar marinara sauce, mozzarella cheese and bake until chicken reaches 165*.  Serve w/ spaghetti SQUASH or Japanese cut green beans    - Dinner Omelet with shrimp or chicken and onion, green peppers and chives.    - No noodle lasagna: Use sliced zucchini or eggplant in place of noodles.  Layer with part skim  ricotta cheese and low sugar meat sauce (use very lean ground beef or ground turkey).    - Mexican chicken bake: Bake chunks of chicken breast or thigh with taco seasoning, Pace brand enchilada sauce, green onions and low-fat cheese. Serve with ¼ cup black beans or fat free refried beans topped with chopped tomatoes or salsa.    - Kashif frozen meatballs, simmered in Classico Marinara sauce. Different flavors of salsa or spaghetti sauce create different dishes! Sprinkle with parmesan cheese. Serve with grilled or steamed veggies, or a dark green salad.    - Simmer boneless skinless chicken thigh chunks in Classico Marinara sauce or roasted salsa until tender with chopped onion, bell pepper, garlic, mushrooms, spinach, etc.     - Hamburger or veggie burger, without the bun, dressed the way you like. Served with grilled or steamed veggies.    - Eggplant parmesan: Bake slices of eggplant at 350 degrees for 15 minutes. Layer tomato sauce, sliced eggplant and low-fat mozzarella cheese in a baking dish and cover with foil. Bake 30-40 more minutes or until bubbly. Uncover and bake at 400 degrees for about 15 more minutes, or until top is slightly crisp.    - Fish tacos: grilled/baked white fish, wrapped in [place] lettuce leaf, topped with salsa, shredded low-fat cheese, and light coleslaw.    - Chicken jose: Sprinkle chicken w/ 1 tsp of [place] ranch dip mix. Then grill chicken and top with black beans, salsa and 1 tsp fat free sour cream.     - Cauliflower pizza crust: Use cauliflower as crust (see recipe on jessica, no flour!). Top w/ low fat cheese, turkey pepperoni and veggies and bake again    - chicken or turkey crust pizza: use ground chicken or turkey instead of cauliflower, spread in [place] and bake at 350 for about 20-30 minutes(may want to add garlic, black pepper, oregano and other herbs to ground meat mixture).  Remove and top w/ low fat cheese, turkey pepperoni and veggies and bake again for  another 10 minutes or until cheese is browned.     Snacks: (100-200 calories; >5g protein)    - 1 low-fat cheese stick with 8 cherry tomatoes or 1 serving fresh fruit  - 4 thin slices fat-free turkey breast and 1 slice low-fat cheese  - 4 thin slices fat-free honey ham with wedge of melon  - 6-8 edamame pods (equivalent to about 1/4 cup edamame without pods).   - 1/4 cup unsalted nuts with ½ cup fruit  - 6-oz container Dannon Light n Fit vanilla yogurt, topped with 1oz unsalted nuts         - apple, celery or baby carrots spread with 2 Tbsp PB2  - apple slices with 1 oz slice low-fat cheese  - Apple slices dipped in 2 Tbsp of PB2  - celery, cucumber, bell pepper or baby carrots dipped in ¼ cup hummus bean spread or light spinach and artichoke dip (*recipe in lunch section)  - celery, cucumber, baby carrots dipped in high protein greek yogurt (Mix 16 oz plain greek yogurt + 1 packet of hidden [place] ranch dip mix)  - Patrick Links Beef Steak - 14g protein! (similar to beef jerky)  - 2 wedges Laughing Cow - Light Herb & Garlic Cheese with sliced cucumber or green bell pepper  - 1/2 cup low-fat cottage cheese with ¼ cup fruit or ¼ cup salsa  - RTD Protein drinks: Atkins, Low Carb Slim Fast, EAS light, Muscle Milk Light, etc.  - Homemade Protein drinks: [employer]NC Soy95, Isopure, Nectar, UNJURY, Whey Gourmet, etc. Mix 1 scoop powder with 8oz skim/1% milk or light soymilk.  - Protein bars: Atkins, EAS, Pure Protein, Think Thin, Detour, etc. Must have 0-4 grams sugar - Read the label.    Takeout Options: No more than twice/week  Deli - Salads (no pasta or rice), meats, cheeses. Roasted chicken. Lox (salmon)    Mexican - Platters which don't include tortillas, chips, or rice. Go easy on the beans. Example: Fajitas without the tortillas. Ask the  not to bring chips to the table if they are too tempting.    Greek - Meat or fish and vegetable, but no bread or rice. Including hummus, baba ganoush, etc, is OK. Most sit-down German  restaurants can provide you with cucumber slices for dipping instead of yana bread.    Fast Food (Avoid as much as possible) - Salads (no croutons and limit salad dressing to 2 tbsp), grilled chicken sandwich without the bun and ask for no bertrand. Viridianas low fat chili or Taco Bell pintos and cheese.    BBQ - The meats are fine if you ask for sauces on the side, but most of the traditional side dishes are loaded with carbs. Capo slaw, baked beans and BBQ sauce are typically made with sugar.    Chinese - Nothing deep-fried, no rice or noodles. Many Chinese sauces have starch and sugar in them, so you'll have to use your judgement. If you find that these sauces trigger cravings, or cause Dumping, you can ask for the sauce to be made without sugar or just use soy sauce.

## 2017-04-03 NOTE — MR AVS SNAPSHOT
[facility]  [address]  [facility] [address]  Phone: [phone]  Fax: [phone]                  Jordin Doradostephanie Erwin   4/3/2017 9:45 AM   Clinical Support    Description:  Male : 1945   Provider:  Rita Nichols RD   Department:  [facility]                To Do List           Future Appointments        Provider Department Dept Phone    2017 10:00 AM Rita Nichols RD [facility] [phone]      Goals (5 Years of Data)     None      [facility]     [facility] On Call Nurse Care Line -  Assistance  Unless otherwise directed by your provider, please contact [facility], our nurse care line that is available for  assistance.     Registered nurses in the [facility] On Call Center provide: appointment scheduling, clinical advisement, health education, and other advisory services.  Call: [phone] (toll free)               Medications           Message regarding Medications     Verify the changes and/or additions to your medication regime listed below are the same as discussed with your clinician today.  If any of these changes or additions are incorrect, please notify your healthcare provider.             Verify that the below list of medications is an accurate representation of the medications you are currently taking.  If none reported, the list may be blank. If incorrect, please contact your healthcare provider. Carry this list with you in case of emergency.           Current Medications     albuterol 90 mcg/actuation inhaler Inhale 2 puffs into the lungs every 4 (four) hours as needed for Wheezing.    albuterol-ipratropium 2.5mg-0.5mg/3mL (DUO-NEB) 0.5 mg-3 mg(2.5 mg base)/3 mL nebulizer solution INHALE 1 VIAL VIA NEBULIZER EVERY 6 HOURS AS NEEDED    amitriptyline (ELAVIL) 25 MG tablet TAKE 1 TABLET BY MOUTH EVERY DAY    aspirin (ECOTRIN) 81 MG EC tablet Take 81 mg by mouth once.    atorvastatin (LIPITOR)  80 MG tablet TAKE 1 TABLET (80 MG TOTAL) BY MOUTH ONCE DAILY.    BETA-CAROTENE,A, W-C & E/MIN (OCUVITE ORAL) Take by mouth once daily.     DALIRESP 500 mcg Tab TAKE 1 TABLET BY MOUTH EVERY DAY    echinacea 400 mg Cap Take by mouth once daily.     fluticasone (FLONASE) 50 mcg/actuation nasal spray INSTILL 1 SPRAY IN EACH NOSTRIL ONCE DAILY.    hydrochlorothiazide (HYDRODIURIL) 25 MG tablet Take 0.5 tablets (12.5 mg total) by mouth once daily.    latanoprost 0.005 % ophthalmic solution Place 1 drop into both eyes once daily.     losartan (COZAAR) 100 MG tablet TAKE 1 TABLET BY MOUTH ONCE A DAY    nitroGLYCERIN (NITROSTAT) 0.4 MG SL tablet Place 0.4 mg under the tongue every 5 (five) minutes as needed.    omeprazole (PRILOSEC) 20 MG capsule TAKE 1 CAPSULE BY MOUTH EVERY DAY    predniSONE (DELTASONE) 5 MG tablet Take 1 tab PO every other day.  Take with food.    SPIRIVA WITH HANDIHALER 18 mcg inhalation capsule INHALE 1 CAPSULE WITH INHALATION DEVICE ONCE DAILY    SYMBICORT 160-4.5 mcg/actuation HFAA INHALE 2 PUFFS INTO THE LUNGS EVERY 12 HOURS. RINSE MOUTH AFTER USE           Clinical Reference Information           Your Vitals Were     Weight BMI             109.9 kg (242 lb 4.6 oz) 35.78 kg/m2         Allergies as of 4/3/2017     Brilinta [Ticagrelor]    Lisinopril    Metoprolol Succinate      Immunizations Administered on Date of Encounter - 4/3/2017     None      Instructions    Focus Goals:  Continue with exercise  Eliminate starchy CHO (rice, pasta, potatoes, bread, oatmeal, tortillas, crackers, cereals)  Correct portion sizes (3 oz protein, 1/2 cup cooked vegetables, 1/2 cup fruit, 8 oz milk)  Protein with meals and snacks ( gms per day)    1200- 1500 calorie Sample meal plan  80-120g protein per day.   Protein drinks and bars: 0-4 grams sugar  Drink lots of water throughout the day and exercise!  MENU # 1  Breakfast: 2 eggs, 1 turkey sausage caden, 1 apple  Snack: Atkins bar  Lunch: 2 roll-ups (sliced  turkey, low-fat sliced cheese, mustard), 12 baby carrots dipped in 1 Tbsp natural peanut butter  Mid-Day Snack: ¼ cup unsalted almonds, ½ cup fruit  Dinner: 1 chicken thigh simmered in tomato sauce + 2 Tbsp mozzarella cheese, ½ cup black beans, 1/2 cup steamed carrots  Evening Snack: 1/2 cup grapes with 4 cubes low-fat cheddar cheese   ___________________________________________________  MENU # 2  Breakfast: 200 calorie protein drink  Mid-morning snack : ¼ cup unsalted nuts, medium banana  Lunch: 3oz tuna or chicken salad made with 2 tbsp light bertrand, over salad with tomatoes and cucumbers.   Snack: low-fat cheese stick  Dinner: 3oz hamburger caden, slice of low-fat cheese, 1 cup boiled yellow squash and zucchini.   Snack: 6oz light yogurt  _______________________________________________________  Menu #3  Breakfast: 6oz plain Greek yogurt + fruit (½ banana, ½ cup fruit - pineapple, blueberries, strawberries, peach), may add Splenda to cassie.  Lunch: Grilled  chicken breast w/ slice low-fat pepper suzy cheese, 1/2 cup grilled/baked asparagus and small salad with Salad Spritzer.    Mid-Day snack: 200 calorie protein drink   Dinner: 4oz Grilled fish, ½ cup white beans, ½ cup cooked spinach  Evening Snack: fudgsicle no-sugar-added    Menu # 4  Breakfast: 1 scoop vanilla protein powder + 4oz skim milk + 4oz coffee   Snack: Pure protein bar  Lunch: 2 Lettuce tacos: 3oz seasoned ground turkey wrapped in a [place] lettuce leaves with 1 Tbsp shredded cheese and dollop low-fat sour cream  Snack: ½ cup cottage cheese, ½ cup pineapple chunks  Dinner: Shrimp omelet: 2 eggs, ½ cup shrimp, green onions, and shredded cheese  ______________________________________________________  Menu #5  Breakfast: 1 cup low-fat cottage cheese, ½ cup peaches (no sugar added)  Snack: 1 apple, 4 cubes cheese  Lunch: 3oz baked pork chop, 1 cup okra and tomato stew  Snack: 1/2 cup black beans + salsa + dollop light sour cream  Dinner: Caprese chicken  salad: 3 oz chicken breast, 1oz fresh mozzarella, sliced tomato, 1 Tbsp olive oil, basil  Snack: sugar-free popsicle    Menu #6  Breakfast: ½ cup part-skim ricotta cheese, 2 Tbsp sugar-free strawberry preserves, sprinkle of slivered almonds  Snack: 1 orange  Lunch: 3 oz canned chicken, 1oz shredded cheddar cheese, ½  cup black beans, 2 Tbsp salsa  Snack: 200 calorie Protein drink  Dinner: 4 oz grilled salmon steak, over mixed green salad with 2 tbsp light dressing    Meal Ideas for Regular Bariatric Diet  *Recipes and products available at [email or web address].[email or web address]      Breakfast: (15-20g protein)    - Egg white omelet: 2 egg whites or ½ cup Egg Beaters. (Optional proteins: cheese, shrimp, black beans, chicken, sliced turkey) (Optional veggies: tomatoes, salsa, spinach, mushrooms, onions, green peppers, or small slice avocado)     - Egg and sausage: 1 egg or ¼ cup Egg Beaters (any variety), with 1 caden or 2 links of Turkey sausage or Veggie breakfast sausage (Azoi or M2 Connections)    - Crust-less breakfast quiche: To make a glass pie dish, mix 4oz part skim Ricotta, 1 cup skim milk, and 2 eggs as your base. Add protein: shredded cheese, sliced lean ham or turkey, turkey schmidt/sausage. Add veggies: tomato, onion, green onion, mushroom, green pepper, spinach, etc.    - Yogurt parfait: Mix 1 - 6oz container Dannon Light N Fit vanilla yogurt, with ¼ cup crushed unsalted nuts    - Cottage cheese and fruit: ½ cup part-skim cottage cheese or ricotta cheese topped with fresh fruit or sugar free preserves     - Anais Tate's Vanilla Egg custard* (add 2 Tbsp instant coffee granules to make Cappuccino Custard*)    - Hi-Protein café latte (skim milk, decaf coffee, 1 scoop protein powder). Optional to add Sugar free syrup or extract flavoring.    - Breakfast Lox: spread fat free cream cheese on slices of smoked salmon. Serve over scrambled or egg over easy (sauteed with nonstick cookspray) OR on a cucumber slice    -  Eggwhich: Scramble or cook 1 large egg over easy using nonstick cookspray. Place between 2 slices of [place] schmidt and low fat cheese.     Lunch: (20-30g protein)    - ½ cup Black bean soup (Homemade or Progresso), with ¼ cup shredded low-fat cheese. Top with chopped tomato or fresh salsa.     - Lean deli turkey breast and low-fat sliced cheese, mustard or light bertrand to moisten, rolled up together, or wrapped in a Francisco lettuce leaf    - Chicken salad made from dinner leftovers, moisten with low-fat salad dressing or light bertrand. Also try leftover salmon, shrimp, tuna or boiled eggs. Serve ½ cup over dark green salad    - Fat-free canned refried beans, topped with ¼ cup shredded low-fat cheese. Top with chopped tomato or fresh salsa.     - Greek salad: Top mixed greens with 1-2oz grilled chicken, tomatoes, red onions, 2-3 kalamata olives, and sprinkle lightly with feta cheese. Spritz with Balsamic vinegar to taste.     - Crust-less lunch quiche: To make a glass pie dish, mix 4oz part skim Ricotta, 1 cup skim milk, and 2 eggs as your base. Add protein: shredded cheese, sliced lean ham or turkey, shrimp, chicken. Add veggies: tomato, onion, green onion, mushroom, green pepper, spinach, artichoke, broccoli, etc.    - Pizza bake: spread a  braulio mireya mushroom with tomato sauce, low-fat shredded mozzarella and turkey pepperoni or [place] schmidt. Add any veggies. Roast for 10-15 minutes, until cheese melted.     - Cucumber crab bites: Spread ¼ cup crab dip (lump crabmeat + light cream cheese and green onions) over sliced cucumber.     - Chicken with light spinach and artichoke dip*: Puree in : 6oz cooked and drained spinach, 2 cloves garlic, 1 can cannelloni beans, ½ cup chopped green onions, 1 can drained artichoke hearts (not marinated in oil), lemon juice and basil. Mix in 2oz chopped up chicken.    Supper: (20-30g protein)    - Serve grilled fish over dark green salad tossed with low-fat dressing,  served with grilled asparagus rodriges     - Rotisserie chicken salad: served with sliced strawberries, walnuts, fat-free feta cheese crumbles and 1 tbsp Herreras Own Light Raspberry Ermine Vinaigrette    - Shrimp cocktail: Dip cold boiled shrimp in homemade low-sugar cocktail sauce (1/2 cup Candy One Carb ketchup, 2 tbsp horseradish, 1/4 tsp hot sauce, 1 tsp Worcestershire sauce, 1 tbsp freshly-squeezed lemon juice). Serve with dark green salad, walnuts, and crumbled blue cheese drizzled with olive oil and Balsamic vinegar    - Tuna Melt: Spread tuna salad onto 2 thick slices of tomato. Top with low-fat cheese and broil until cheese is melted. May also be made with chicken salad of shrimp salad. [place] with different types of cheeses.    - Chicken or beef fajitas (no tortilla, rice, beans, chips). Top meat and veggies w/ fresh salsa, fat free sour cream.     - Homemade low-fat Chili using extra lean ground beef or ground turkey. Top with shredded cheese and salsa as desired. May add dollop fat-free sour cream if desired    - Chicken parmesan: Top chicken breast w/ low sugar marinara sauce, mozzarella cheese and bake until chicken reaches 165*.  Serve w/ spaghetti SQUASH or Setswana cut green beans    - Dinner Omelet with shrimp or chicken and onion, green peppers and chives.    - No noodle lasagna: Use sliced zucchini or eggplant in place of noodles.  Layer with part skim ricotta cheese and low sugar meat sauce (use very lean ground beef or ground turkey).    - Mexican chicken bake: Bake chunks of chicken breast or thigh with taco seasoning, Pace brand enchilada sauce, green onions and low-fat cheese. Serve with ¼ cup black beans or fat free refried beans topped with chopped tomatoes or salsa.    - Kashif frozen meatballs, simmered in Classico Marinara sauce. Different flavors of salsa or spaghetti sauce create different dishes! Sprinkle with parmesan cheese. Serve with grilled or steamed veggies, or a dark  green salad.    - Simmer boneless skinless chicken thigh chunks in Classico Marinara sauce or roasted salsa until tender with chopped onion, bell pepper, garlic, mushrooms, spinach, etc.     - Hamburger or veggie burger, without the bun, dressed the way you like. Served with grilled or steamed veggies.    - Eggplant parmesan: Bake slices of eggplant at 350 degrees for 15 minutes. Layer tomato sauce, sliced eggplant and low-fat mozzarella cheese in a baking dish and cover with foil. Bake 30-40 more minutes or until bubbly. Uncover and bake at 400 degrees for about 15 more minutes, or until top is slightly crisp.    - Fish tacos: grilled/baked white fish, wrapped in Francisco lettuce leaf, topped with salsa, shredded low-fat cheese, and light coleslaw.    - Chicken jose: Sprinkle chicken w/ 1 tsp of [place] ranch dip mix. Then grill chicken and top with black beans, salsa and 1 tsp fat free sour cream.     - Cauliflower pizza crust: Use cauliflower as crust (see recipe on [facility], no flour!). Top w/ low fat cheese, turkey pepperoni and veggies and bake again    - chicken or turkey crust pizza: use ground chicken or turkey instead of cauliflower, spread in [place] and bake at 350 for about 20-30 minutes(may want to add garlic, black pepper, oregano and other herbs to ground meat mixture).  Remove and top w/ low fat cheese, turkey pepperoni and veggies and bake again for another 10 minutes or until cheese is browned.     Snacks: (100-200 calories; >5g protein)    - 1 low-fat cheese stick with 8 cherry tomatoes or 1 serving fresh fruit  - 4 thin slices fat-free turkey breast and 1 slice low-fat cheese  - 4 thin slices fat-free honey ham with wedge of melon  - 6-8 edamame pods (equivalent to about 1/4 cup edamame without pods).   - 1/4 cup unsalted nuts with ½ cup fruit  - 6-oz container Dannon Light n Fit vanilla yogurt, topped with 1oz unsalted nuts         - apple, celery or baby carrots spread with 2 Tbsp  PB2  - apple slices with 1 oz slice low-fat cheese  - Apple slices dipped in 2 Tbsp of PB2  - celery, cucumber, bell pepper or baby carrots dipped in ¼ cup hummus bean spread or light spinach and artichoke dip (*recipe in lunch section)  - celery, cucumber, baby carrots dipped in high protein greek yogurt (Mix 16 oz plain greek yogurt + 1 packet of [place] ranch dip mix)  - Patrick Links Beef Steak - 14g protein! (similar to beef jerky)  - 2 wedges [place] - Light Herb & Garlic Cheese with sliced cucumber or green bell pepper  - 1/2 cup low-fat cottage cheese with ¼ cup fruit or ¼ cup salsa  - RTD Protein drinks: Atkins, Low Carb Slim Fast, EAS light, Muscle Milk Light, etc.  - Homemade Protein drinks: GNC Soy95, Isopure, Nectar, UNJURY, Whey Gourmet, etc. Mix 1 scoop powder with 8oz skim/1% milk or light soymilk.  - Protein bars: Atkins, EAS, Pure Protein, Think Thin, Detour, etc. Must have 0-4 grams sugar - Read the label.    Takeout Options: No more than twice/week  Deli - Salads (no pasta or rice), meats, cheeses. Roasted chicken. Lox (salmon)    Mexican - Platters which don't include tortillas, chips, or rice. Go easy on the beans. Example: Fajitas without the tortillas. Ask the  not to bring chips to the table if they are too tempting.    Greek - Meat or fish and vegetable, but no bread or rice. Including hummus, baba ganoush, etc, is OK. Most sit-down Greek restaurants can provide you with cucumber slices for dipping instead of yana bread.    Fast Food (Avoid as much as possible) - Salads (no croutons and limit salad dressing to 2 tbsp), grilled chicken sandwich without the bun and ask for no bertrand. Viridianas low fat chili or Taco Bell pintos and cheese.    BBQ - The meats are fine if you ask for sauces on the side, but most of the traditional side dishes are loaded with carbs. Capo slaw, baked beans and BBQ sauce are typically made with sugar.    Chinese - Nothing deep-fried, no rice or noodles.  Many Chinese sauces have starch and sugar in them, so you'll have to use your judgement. If you find that these sauces trigger cravings, or cause Dumping, you can ask for the sauce to be made without sugar or just use soy sauce.           Language Assistance Services     ATTENTION: Language assistance services are available, free of charge. Please call [phone].      ATENCIÓN: Si habla español, tiene a asher disposición servicios gratuitos de asistencia lingüística. Llame al [phone].     WNI Ý: N?u b?n nói Ti?ng Vi?t, có các d?ch v? h? tr? ngôn ng? mi?n phí dành cho b?n. G?i s? [phone].         [facility] complies with applicable Federal civil rights laws and does not discriminate on the basis of race, color, national origin, age, disability, or sex.

## 2017-04-03 NOTE — PROGRESS NOTES
NUTRITION NOTE    Referring Physician: Jordin Cornejo M.D.  Reason for MNT Referral: 3 months Medically Supervised Diet pending Gastric Sleeve    PAST MEDICAL HISTORY:    Patient presents for 1st visit for MSD with +3 lbs weight gain over the past month. Patient states his struggle has been getting in 6 meals/day.     Past Medical History:   Diagnosis Date    Benign neoplasm of colon 10/1/2013    Bronchitis chronic     CAD (coronary artery disease) 10/31/2013    COPD (chronic obstructive pulmonary disease)     Emphysema of lung     GERD (gastroesophageal reflux disease)     Hx of colonic polyps     Hypertension     NAFLD (nonalcoholic fatty liver disease) 3/27/2017    SHOLA on CPAP     RLS (restless legs syndrome)     Screen for colon cancer 8/30/2013       CLINICAL DATA:  71 y.o. male.    There were no vitals filed for this visit.    Current Weight: 242 lbs  Weight Change Since Initial Visit: +3 lbs  Ideal Body Weight: 153 lbs  BMI: 35    CURRENT DIET:  Regular diet.  Diet Recall: Food records are present.    Diet Includes:   Brkfst: 1 cup yogurt, 1 cup coffee with S&L  Snk: 1 cup cottage cheese   Lunch: 3 oz lasagna,  16 oz [place]ilo Iced tea  Dinner: 2 cups Red beans, 16 oz [place] Iced tea, 1 cup coffee with S&L  Snk: 8 oz pineapple milk shake  Discussed proper portion sizes with pt. Per food records pt food portions are larger than recommended portions.  Food choices still include starchy CHO (oatmeal, cereal, potatoes, crackers, bread, cece crackers) & fried foods. Reviewed starchy CHO foods with patient.    Meal Pattern: Improved.  Protein Supplements: 1-2 per day.  Snacking: Adequate. Snacks include healthy choices and sweets.    Vegetables: Likes a variety. Eats daily.  Fruits: Likes a variety. Eats daily.  Beverages: sugar-free beverages, diet tea, coffee without sugar and 1% milk  Dining Out: Dining out: Weekly. (3-4 times over the past month) Mostly restaurants and take-out.  Cooking at home:  Weekly. (3-4 times/week) Mostly baked and grilled meat, fish and vegetables.    CURRENT EXERCISE:  Adequate  Golf   Vitamins / Minerals / Herbs:   MultiVit Men's    Food Allergies:   No known    Social:  Retired.  Alcohol: Socially.  Smoking: None.    ASSESSMENT:  Patient demonstrates some willingness to change lifestyle habits as evidenced by weight gain, daily food logs, dietary changes, including protein drinks, increased fruits, increased vegetables, reduced dining out, better choices when dining out, more food preparation at [place]a and healthier cooking at home.    Doing fairly well with working on greatest challenges (sweets, starchy CHO, portion control and irregular meal patterns).    Excess calorie intake.  Adequate protein intake.    PLAN:    Diet: Adjust diet plan.    Exercise: Maintain.    Behavior Modification: Continue to document food & activity logs daily.    Weight loss prior to surgery (5-10% TBW): 12-23 lbs    Return to clinic in one month.    Focus Goals:  Continue with exercise  Eliminate starchy CHO (rice, pasta, potatoes, bread, oatmeal, tortillas)  Correct portion sizes (3 oz protein, 1/2 cup cooked vegetables, 1/2 cup fruit, 8 oz milk)  Protein with meals and snacks ( gms per day)    SESSION TIME:  30 minutes

## 2017-04-05 ENCOUNTER — TELEPHONE (OUTPATIENT)
Dept: INTERNAL MEDICINE | Facility: CLINIC | Age: 72
End: 2017-04-05

## 2017-04-05 DIAGNOSIS — I10 ESSENTIAL HYPERTENSION, BENIGN: Primary | ICD-10-CM

## 2017-04-05 DIAGNOSIS — Z12.5 SCREENING PSA (PROSTATE SPECIFIC ANTIGEN): ICD-10-CM

## 2017-04-05 NOTE — TELEPHONE ENCOUNTER
----- Message from Silvana Bojorquez sent at 4/5/2017 11:22 AM CDT -----  Contact: Self  Doctor appointment and lab have been scheduled.  Please link lab orders to the lab appointment.  Date of doctor appointment:  8/8  Physical or EP:  Physical   Date of lab appointment:  8/8  Comments:

## 2017-04-17 ENCOUNTER — PATIENT MESSAGE (OUTPATIENT)
Dept: PULMONOLOGY | Facility: CLINIC | Age: 72
End: 2017-04-17

## 2017-04-17 DIAGNOSIS — J43.2 CENTRILOBULAR EMPHYSEMA: Primary | Chronic | ICD-10-CM

## 2017-04-18 ENCOUNTER — PATIENT MESSAGE (OUTPATIENT)
Dept: PULMONOLOGY | Facility: CLINIC | Age: 72
End: 2017-04-18

## 2017-04-18 ENCOUNTER — HOSPITAL ENCOUNTER (EMERGENCY)
Facility: HOSPITAL | Age: 72
Discharge: HOME | End: 2017-04-18
Attending: EMERGENCY MEDICINE
Payer: MEDICARE

## 2017-04-18 VITALS
WEIGHT: 240 LBS | OXYGEN SATURATION: 95 % | HEIGHT: 68 IN | SYSTOLIC BLOOD PRESSURE: 142 MMHG | HEART RATE: 96 BPM | BODY MASS INDEX: 36.37 KG/M2 | TEMPERATURE: 98 F | RESPIRATION RATE: 18 BRPM | DIASTOLIC BLOOD PRESSURE: 76 MMHG

## 2017-04-18 DIAGNOSIS — J44.9 CHRONIC OBSTRUCTIVE PULMONARY DISEASE: ICD-10-CM

## 2017-04-18 DIAGNOSIS — G47.33 OSA (OBSTRUCTIVE SLEEP APNEA): ICD-10-CM

## 2017-04-18 DIAGNOSIS — R06.02 SHORTNESS OF BREATH: Primary | ICD-10-CM

## 2017-04-18 LAB
ALBUMIN SERPL BCP-MCNC: 3.8 G/DL
ALP SERPL-CCNC: 103 U/L
ALT SERPL W/O P-5'-P-CCNC: 32 U/L
ANION GAP SERPL CALC-SCNC: 9 MMOL/L
AST SERPL-CCNC: 22 U/L
BASOPHILS # BLD AUTO: 0.04 K/UL
BASOPHILS NFR BLD: 0.4 %
BILIRUB SERPL-MCNC: 1.3 MG/DL
BUN SERPL-MCNC: 19 MG/DL
CALCIUM SERPL-MCNC: 9.7 MG/DL
CHLORIDE SERPL-SCNC: 106 MMOL/L
CO2 SERPL-SCNC: 26 MMOL/L
CREAT SERPL-MCNC: 0.9 MG/DL
DIFFERENTIAL METHOD: ABNORMAL
EOSINOPHIL # BLD AUTO: 0.4 K/UL
EOSINOPHIL NFR BLD: 3.7 %
ERYTHROCYTE [DISTWIDTH] IN BLOOD BY AUTOMATED COUNT: 14.2 %
EST. GFR  (AFRICAN AMERICAN): >60 ML/MIN/1.73 M^2
EST. GFR  (NON AFRICAN AMERICAN): >60 ML/MIN/1.73 M^2
GLUCOSE SERPL-MCNC: 112 MG/DL
HCT VFR BLD AUTO: 41.2 %
HGB BLD-MCNC: 14.1 G/DL
LYMPHOCYTES # BLD AUTO: 1 K/UL
LYMPHOCYTES NFR BLD: 10.6 %
MCH RBC QN AUTO: 27.4 PG
MCHC RBC AUTO-ENTMCNC: 34.2 %
MCV RBC AUTO: 80 FL
MONOCYTES # BLD AUTO: 0.5 K/UL
MONOCYTES NFR BLD: 5.2 %
NEUTROPHILS # BLD AUTO: 7.7 K/UL
NEUTROPHILS NFR BLD: 79.6 %
PLATELET # BLD AUTO: 184 K/UL
PMV BLD AUTO: 10.1 FL
POTASSIUM SERPL-SCNC: 3.8 MMOL/L
PROT SERPL-MCNC: 7.3 G/DL
RBC # BLD AUTO: 5.14 M/UL
SODIUM SERPL-SCNC: 141 MMOL/L
WBC # BLD AUTO: 9.69 K/UL

## 2017-04-18 PROCEDURE — 96374 THER/PROPH/DIAG INJ IV PUSH: CPT

## 2017-04-18 PROCEDURE — 93010 ELECTROCARDIOGRAM REPORT: CPT | Mod: ,,, | Performed by: INTERNAL MEDICINE

## 2017-04-18 PROCEDURE — 99284 EMERGENCY DEPT VISIT MOD MDM: CPT | Mod: 25

## 2017-04-18 PROCEDURE — 93005 ELECTROCARDIOGRAM TRACING: CPT

## 2017-04-18 PROCEDURE — 63600175 PHARM REV CODE 636 W HCPCS: Performed by: EMERGENCY MEDICINE

## 2017-04-18 PROCEDURE — 94640 AIRWAY INHALATION TREATMENT: CPT

## 2017-04-18 PROCEDURE — 80053 COMPREHEN METABOLIC PANEL: CPT

## 2017-04-18 PROCEDURE — 99284 EMERGENCY DEPT VISIT MOD MDM: CPT | Mod: GC,,, | Performed by: EMERGENCY MEDICINE

## 2017-04-18 PROCEDURE — 85025 COMPLETE CBC W/AUTO DIFF WBC: CPT

## 2017-04-18 PROCEDURE — 25000242 PHARM REV CODE 250 ALT 637 W/ HCPCS: Performed by: EMERGENCY MEDICINE

## 2017-04-18 RX ORDER — PREDNISONE 50 MG/1
50 TABLET ORAL DAILY
Qty: 5 TABLET | Refills: 0 | Status: SHIPPED | OUTPATIENT
Start: 2017-04-18 | End: 2017-04-23

## 2017-04-18 RX ORDER — BENZONATATE 100 MG/1
100 CAPSULE ORAL 3 TIMES DAILY PRN
Qty: 30 CAPSULE | Refills: 0 | Status: SHIPPED | OUTPATIENT
Start: 2017-04-18 | End: 2017-04-28

## 2017-04-18 RX ORDER — AZITHROMYCIN 250 MG/1
TABLET, FILM COATED ORAL
Qty: 6 TABLET | Refills: 1 | Status: SHIPPED | OUTPATIENT
Start: 2017-04-18 | End: 2017-07-06

## 2017-04-18 RX ORDER — IPRATROPIUM BROMIDE AND ALBUTEROL SULFATE 2.5; .5 MG/3ML; MG/3ML
3 SOLUTION RESPIRATORY (INHALATION)
Status: COMPLETED | OUTPATIENT
Start: 2017-04-18 | End: 2017-04-18

## 2017-04-18 RX ORDER — PREDNISONE 10 MG/1
TABLET ORAL
Qty: 10 TABLET | Refills: 1 | Status: SHIPPED | OUTPATIENT
Start: 2017-04-18 | End: 2017-07-06

## 2017-04-18 RX ORDER — METHYLPREDNISOLONE SOD SUCC 125 MG
125 VIAL (EA) INJECTION
Status: COMPLETED | OUTPATIENT
Start: 2017-04-18 | End: 2017-04-18

## 2017-04-18 RX ADMIN — IPRATROPIUM BROMIDE AND ALBUTEROL SULFATE 3 ML: .5; 3 SOLUTION RESPIRATORY (INHALATION) at 10:04

## 2017-04-18 RX ADMIN — METHYLPREDNISOLONE SODIUM SUCCINATE 125 MG: 125 INJECTION, POWDER, FOR SOLUTION INTRAMUSCULAR; INTRAVENOUS at 11:04

## 2017-04-18 NOTE — ED NOTES
Pt placed on cardiac monitor, continuous pulse ox, cycling blood pressures. Side rails up x2, call bell in reach, bed in low position with brake engaged.

## 2017-04-18 NOTE — ED PROVIDER NOTES
Encounter Date: 4/18/2017       History     Chief Complaint   Patient presents with    URI     wheezing, hx copd     Review of patient's allergies indicates:   Allergen Reactions    Brilinta [ticagrelor] Itching    Lisinopril      Other reaction(s): cough    Metoprolol succinate Rash     HPI Comments: Pt is a 72 yo M with a h/o bronchitis, CAD, COPD, GERD, HTN, SHOLA on CPAP who presents to the ED with cough and SOB. Pt takes 5mg prednisone every other day. Pt notes cough over the past 5 days and this morning with progressive SOB over the past two days. Pt denies fever at home. No associated with CP. Pt denies any pleuritic CP, hemoptysis, swelling of unilateral lower extremity, surgery in the past 4 wks, or recent travel. Pt denies any relieve with albuterol inhaler. No other exacerbating factors.    Past Medical History:   Diagnosis Date    Benign neoplasm of colon 10/1/2013    Bronchitis chronic     CAD (coronary artery disease) 10/31/2013    COPD (chronic obstructive pulmonary disease)     Emphysema of lung     GERD (gastroesophageal reflux disease)     Hx of colonic polyps     Hypertension     NAFLD (nonalcoholic fatty liver disease) 3/27/2017    SHOLA on CPAP     RLS (restless legs syndrome)     Screen for colon cancer 8/30/2013     Past Surgical History:   Procedure Laterality Date    bilateral foot surgery  1993    CARDIAC CATHETERIZATION  2013    x1    CATARACT EXTRACTION, BILATERAL      COLON SURGERY  2013    Ace Mott MD     Family History   Problem Relation Age of Onset    Heart disease Mother     Heart attack Mother     Hypertension Mother     Asthma Neg Hx     Emphysema Neg Hx     Prostate cancer Neg Hx     Cirrhosis Neg Hx      Social History   Substance Use Topics    Smoking status: Former Smoker     Packs/day: 1.00     Years: 40.00     Types: Cigarettes     Quit date: 8/15/2012    Smokeless tobacco: Never Used    Alcohol use Yes      Comment: social use only     Review  of Systems   Constitutional: Negative for chills and fever.   HENT: Negative for congestion.    Eyes: Negative for visual disturbance.   Respiratory: Positive for shortness of breath. Negative for cough and wheezing.    Cardiovascular: Negative for chest pain.   Gastrointestinal: Negative for abdominal distention, abdominal pain, blood in stool, diarrhea, nausea and vomiting.   Genitourinary: Negative for dysuria and hematuria.   Musculoskeletal: Negative for myalgias.   Skin: Negative for rash.   Neurological: Negative for headaches.       Physical Exam   Initial Vitals   BP Pulse Resp Temp SpO2   04/18/17 0830 04/18/17 0830 04/18/17 0830 04/18/17 0830 04/18/17 0830   184/90 113 24 97.8 °F (36.6 °C) 95 %     Physical Exam    Constitutional: He appears well-developed and well-nourished. No distress.   HENT:   Head: Normocephalic and atraumatic.   Eyes: EOM are normal. Pupils are equal, round, and reactive to light.   Neck: Normal range of motion. Neck supple.   Cardiovascular: Normal rate, regular rhythm and normal heart sounds. Exam reveals no gallop and no friction rub.    No murmur heard.  Pulmonary/Chest: No respiratory distress. He has wheezes. He has no rales. He exhibits no tenderness.   Abdominal: Soft. He exhibits no distension and no mass. There is no tenderness. There is no rebound.   Neurological: He is alert and oriented to person, place, and time. No cranial nerve deficit.   Skin: Skin is warm and dry.   Psychiatric: He has a normal mood and affect. His behavior is normal. Thought content normal.         ED Course   Procedures  Labs Reviewed - No data to display         HOII MDM  This is an emergent evaluation of 71 y.o. male who presents with SOB in the setting of bronchitis, COPD.     VS wnl. CBC wnl. CMP wnl. CXR shows no acute cardiopulmonary process. Pt given duoneb tx here in ED. Given solumedrol. Pt has had relief of symptoms. Will discharge home with short course of steroids. Pt educated  regarding results, projected clinical course, and reasons to return to the ER. Pt endorsed understanding. Discharged home in stable condition.    Nader Pa MD  PGY-2, [employer]  9:31 PM  4/20/2017                    ED Course     Clinical Impression:   The encounter diagnosis was Shortness of breath.    Disposition:   Disposition: Discharged  Condition: Stable       Lukas Pa MD  Resident  04/20/17 7930

## 2017-04-18 NOTE — TELEPHONE ENCOUNTER
Pt reports recent cough with shortness of breath.  No definite sputum but having chest congestion.  Seen in ED and prescribed 50 mg Prednisone x 5 days.  I have asked him to continue 10 mg QD x 7-10 days after that, and then resume his maintenance dose of 5 mg QOD.  Also to begin Z-Pack.

## 2017-04-18 NOTE — ED NOTES
LOC: The patient is awake and alert; oriented x 3 and speaking appropriately.  APPEARANCE: Patient resting comfortably, patient is clean and well groomed  SKIN: warm and dry, normal skin turgor & moist mucus membranes, skin intact, no breakdown noted.  MUSCULOSKELETAL: Patient moving all extremities well, no obvious swelling or deformities noted, moist cough  Noted.   RESPIRATORY: Airway is open and patent, breath sounds clear throughout all lung fields but diminished; respirations are spontaneous, increased  effort and rate especially w/ exertion  CARDIAC: Patient has a normal rate, no peripheral edema noted, capillary refill < 3 seconds; No complaints of chest pain   ABDOMEN: Soft and non tender to palpation, no distention noted. Bowel sounds present x 4

## 2017-04-18 NOTE — ED TRIAGE NOTES
SOB , cough and wheezing  That began 5 days ago , getting worse over the weekend. Too SOB with exertion. Non productive cough, no edema noted. Denies chest ain or fever

## 2017-04-18 NOTE — DISCHARGE INSTRUCTIONS

## 2017-04-18 NOTE — ED AVS SNAPSHOT
[facility]  [address]  [facility] [address]               Jordin Allen Jr.   2017  8:31 AM   ED    Description:  Male : 1945   Department:  [facility]           Your Care was Coordinated By:     Provider Role From To    Dakota Guerrero MD Attending Provider 17 0902 --    Lukas Pa MD Resident 17 0833 --      Reason for Visit     URI           Diagnoses this Visit        Comments    Shortness of breath    -  Primary       ED Disposition     ED Disposition Condition Comment    Discharge  stable           To Do List           Follow-up Information     [facility]    Specialty:  Emergency Medicine    Why:  Should you develope new or worsening symptoms, including but not limited to worseing shortness of breath, chest pain, coughing up blood    Contact information:    [address]  [facility] [address] [phone]        Follow up with APRIL Mccain MD In 5 days.    Specialty:  Internal Medicine    Why:  follow up regarding your recent visit to the ER    Contact information:     [facility] [address]  [phone]         These Medications        Disp Refills Start End    predniSONE (DELTASONE) 50 MG Tab 5 tablet 0 2017    Take 1 tablet (50 mg total) by mouth once daily. - Oral    Pharmacy: [facility]/pharmacy #[id number] - [place], [place] - [address] [facility] Ph #: [phone]         [facility]     [facility] Nurse Care Line - 24/ Assistance  Unless otherwise directed by your provider, please contact [facility], our nurse care line that is available for 24/ assistance.     Registered nurses in the [facility] provide: appointment scheduling, clinical advisement, health education, and other advisory services.  Call: [phone] (toll free)               Medications            Message regarding Medications     Verify the changes and/or additions to your medication regime listed below are the same as discussed with your clinician today.  If any of these changes or additions are incorrect, please notify your healthcare provider.        START taking these NEW medications        Refills    predniSONE (DELTASONE) 50 MG Tab 0    Sig: Take 1 tablet (50 mg total) by mouth once daily.    Class: Print    Route: Oral      These medications were administered today        Dose Freq    albuterol-ipratropium 2.5mg-0.5mg/3mL nebulizer solution 3 mL 3 mL Every 5 min    Sig: Take 3 mLs by nebulization every 5 (five) minutes.    Class: Normal    Route: Nebulization    methylPREDNISolone sodium succinate injection 125 mg 125 mg ED 1 Time    Sig: Inject 125 mg into the vein ED 1 Time.    Class: Normal    Route: Intravenous           Verify that the below list of medications is an accurate representation of the medications you are currently taking.  If none reported, the list may be blank. If incorrect, please contact your healthcare provider. Carry this list with you in case of emergency.           Current Medications     albuterol 90 mcg/actuation inhaler Inhale 2 puffs into the lungs every 4 (four) hours as needed for Wheezing.    albuterol-ipratropium 2.5mg-0.5mg/3mL (DUO-NEB) 0.5 mg-3 mg(2.5 mg base)/3 mL nebulizer solution INHALE 1 VIAL VIA NEBULIZER EVERY 6 HOURS AS NEEDED    amitriptyline (ELAVIL) 25 MG tablet TAKE 1 TABLET BY MOUTH EVERY DAY    aspirin (ECOTRIN) 81 MG EC tablet Take 81 mg by mouth once.    atorvastatin (LIPITOR) 80 MG tablet TAKE 1 TABLET (80 MG TOTAL) BY MOUTH ONCE DAILY.    BETA-CAROTENE,A, W-C & E/MIN (OCUVITE ORAL) Take by mouth once daily.     DALIRESP 500 mcg Tab TAKE 1 TABLET BY MOUTH EVERY DAY    echinacea 400 mg Cap Take by mouth once daily.     fluticasone (FLONASE) 50 mcg/actuation nasal spray INSTILL 1 SPRAY IN EACH NOSTRIL ONCE DAILY.    hydrochlorothiazide  "(HYDRODIURIL) 25 MG tablet Take 0.5 tablets (12.5 mg total) by mouth once daily.    latanoprost 0.005 % ophthalmic solution Place 1 drop into both eyes once daily.     losartan (COZAAR) 100 MG tablet TAKE 1 TABLET BY MOUTH ONCE A DAY    omeprazole (PRILOSEC) 20 MG capsule TAKE 1 CAPSULE BY MOUTH EVERY DAY    predniSONE (DELTASONE) 5 MG tablet Take 1 tab PO every other day.  Take with food.    SPIRIVA WITH HANDIHALER 18 mcg inhalation capsule INHALE 1 CAPSULE WITH INHALATION DEVICE ONCE DAILY    SYMBICORT 160-4.5 mcg/actuation HFAA INHALE 2 PUFFS INTO THE LUNGS EVERY 12 HOURS. RINSE MOUTH AFTER USE    albuterol-ipratropium 2.5mg-0.5mg/3mL nebulizer solution 3 mL Take 3 mLs by nebulization every 5 (five) minutes.    methylPREDNISolone sodium succinate injection 125 mg Inject 125 mg into the vein ED 1 Time.    nitroGLYCERIN (NITROSTAT) 0.4 MG SL tablet Place 0.4 mg under the tongue every 5 (five) minutes as needed.    predniSONE (DELTASONE) 50 MG Tab Take 1 tablet (50 mg total) by mouth once daily.           Clinical Reference Information           Your Vitals Were     BP Pulse Temp Resp Height Weight    184/90 93 97.8 °F (36.6 °C) (Oral) 15 5' 8" (1.727 m) 108.9 kg (240 lb)    SpO2 BMI             99% 36.49 kg/m2         Allergies as of 4/18/2017        Reactions    Brilinta [Ticagrelor] Itching    Lisinopril     Other reaction(s): cough    Metoprolol Succinate Rash      Immunizations Administered on Date of Encounter - 4/18/2017     None      ED Micro, Lab, POCT     Start Ordered       Status Ordering Provider    04/18/17 0933 04/18/17 0932  CBC auto differential  STAT      Final result     04/18/17 0933 04/18/17 0932  Comprehensive metabolic panel  STAT      Final result       ED Imaging Orders     Start Ordered       Status Ordering Provider    04/18/17 0933 04/18/17 0932  X-Ray Chest PA And Lateral  1 time imaging      Final result         Discharge Instructions         Shortness of Breath (Dyspnea)  Shortness of " breath is the feeling that you can't catch your breath or get enough air. It is also known as dyspnea.  Dyspnea can be caused by many different conditions. They include:  · Acute asthma attack.  · Worsening of chronic lung diseases such as chronic bronchitis and emphysema.  · Heart failure. This is when weak heart muscle allows extra fluid to collect in the lungs.  · Panic attacks or anxiety. Fear can cause rapid breathing (hyperventilation).  · Pneumonia, or an infection in the lung tissue.  · Exposure to toxic substances, fumes, smoke, or certain medicines.  · Blood clot in the lung (pulmonary embolism). This is often from a piece of blood clot in a deep vein of the leg (deep vein thrombosis) that breaks off and travels to the lungs.  · Heart attack or heart-related chest pain (angina).  · Anemia.  · Collapsed lung (pneumothorax).  · Dehydration.  · Pregnancy.  Based on your visit today, the exact cause of your shortness of breath is not certain. Your tests dont show any of the serious causes of dyspnea. You may need other tests to find out if you have a serious problem. Its important to watch for any new symptoms or symptoms that get worse. Follow up with your healthcare provider as directed.  Home care  Follow these tips to take care of yourself at home:  · When your symptoms are better, go back to your usual activities.  · If you smoke, you should stop. Join a quit-smoking program or ask your healthcare provider for help.  · Eat a healthy diet and get plenty of sleep.  · Get regular exercise. Talk with your healthcare provider before starting to exercise, especially if you have other medical problems.  · Cut down on the amount of caffeine and stimulants you consume.  Follow-up care  Follow up with your healthcare provider, or as advised.  If tests were done, you will be told if your treatment needs to be changed. You can call as directed for the results.  (Note: If an X-ray was taken, a specialist will review  it. You will be notified of any new findings that may affect your care.)  Call 911 or get immediate medical care  Shortness of breath may be a sign of a serious medical problem. For example, it may be a problem with your heart or lungs. Call 911 if you have worsening shortness of breath or trouble breathing, especially with any of the symptoms below:  · You are confused or its difficult to wake you.  · You faint or lose consciousness.  · You have a fast heartbeat, or your heartbeat is irregular.  · You are coughing up blood.  · You have pain in your chest, arm, shoulder, neck, or upper back.  · You break out in a sweat.  When to seek medical advice  Call your healthcare provider right away if any of these occur:  · Slight shortness of breath or wheezing  · Redness, pain or swelling in your leg, arm, or other body area  · Swelling in both legs or ankles  · Fast weight gain  · Dizziness or weakness  · Fever of 100.4ºF (38ºC) or higher, or as directed by your healthcare provider  Date Last Reviewed: 9/13/2015  © 0753-1312 [employer]. [address] [place] [address]. All rights reserved. This information is not intended as a substitute for professional medical care. Always follow your healthcare professional's instructions.          Your Scheduled Appointments     May 01, 2017 10:00 AM CDT   Established Patient Visit with CARLOS Prajapati - Bariatric Surgery ([facility] )    [facility] [address]  [facility] [address]   [phone]            Aug 08, 2017  8:00 AM CDT   Fasting Lab with [facility] - Laboratory ([facility] [place])    [address] [facility]  [place] [address]   [phone]            Aug 08, 2017  8:15 AM CDT   Urine with SPE[facility] - Specimen Lab ([facility] [place])    [address] [facility]  [place] [id number] [phone]            Aug 08, 2017  9:00 AM CDT   Physical with MD [place] -  Internal Medicine ([facility] [place])    [address] [facility]  [place] [id number] [phone]              Smoking Cessation     If you would like to quit smoking:   You may be eligible for free services if you are a [place] resident and started smoking cigarettes before September 1, 1988.  Call the Smoking Cessation Trust (SCT) toll free at (844) 242-1958 or (412) 543-2652.   Call 1-800-QUIT-NOW if you do not meet the above criteria.   Contact us via email: [email or web address]   View our website for more information: [email or web address]         [facility] complies with applicable Federal civil rights laws and does not discriminate on the basis of race, color, national origin, age, disability, or sex.        Language Assistance Services     ATTENTION: Language assistance services are available, free of charge. Please call [phone].      ATENCIÓN: Si habla español, tiene a asher disposición servicios gratuitos de asistencia lingüística. Llame al [phone].     WIN Ý: N?u b?n nói Ti?ng Vi?t, có các d?ch v? h? tr? ngôn ng? mi?n phí dành cho b?n. G?i s? [phone].

## 2017-04-26 RX ORDER — BUDESONIDE AND FORMOTEROL FUMARATE DIHYDRATE 160; 4.5 UG/1; UG/1
AEROSOL RESPIRATORY (INHALATION)
Qty: 10.2 INHALER | Refills: 6 | Status: SHIPPED | OUTPATIENT
Start: 2017-04-26 | End: 2017-11-11 | Stop reason: SDUPTHER

## 2017-04-28 ENCOUNTER — TELEPHONE (OUTPATIENT)
Dept: BARIATRICS | Facility: CLINIC | Age: 72
End: 2017-04-28

## 2017-04-28 ENCOUNTER — PATIENT MESSAGE (OUTPATIENT)
Dept: PULMONOLOGY | Facility: CLINIC | Age: 72
End: 2017-04-28

## 2017-04-28 DIAGNOSIS — J43.2 CENTRILOBULAR EMPHYSEMA: ICD-10-CM

## 2017-04-28 RX ORDER — ALBUTEROL SULFATE 90 UG/1
2 AEROSOL, METERED RESPIRATORY (INHALATION) EVERY 4 HOURS PRN
Qty: 1 EACH | Refills: 12 | Status: SHIPPED | OUTPATIENT
Start: 2017-04-28 | End: 2019-09-08 | Stop reason: SDUPTHER

## 2017-05-01 ENCOUNTER — PATIENT MESSAGE (OUTPATIENT)
Dept: PULMONOLOGY | Facility: CLINIC | Age: 72
End: 2017-05-01

## 2017-05-01 ENCOUNTER — DOCUMENTATION ONLY (OUTPATIENT)
Dept: BARIATRICS | Facility: CLINIC | Age: 72
End: 2017-05-01

## 2017-05-01 ENCOUNTER — CLINICAL SUPPORT (OUTPATIENT)
Dept: BARIATRICS | Facility: CLINIC | Age: 72
End: 2017-05-01
Payer: MEDICARE

## 2017-05-01 VITALS — WEIGHT: 233.25 LBS | BODY MASS INDEX: 35.47 KG/M2

## 2017-05-01 DIAGNOSIS — I10 HTN (HYPERTENSION), BENIGN: ICD-10-CM

## 2017-05-01 DIAGNOSIS — E66.09 NON MORBID OBESITY DUE TO EXCESS CALORIES: ICD-10-CM

## 2017-05-01 DIAGNOSIS — Z71.3 DIETARY COUNSELING AND SURVEILLANCE: ICD-10-CM

## 2017-05-01 DIAGNOSIS — G47.33 OSA (OBSTRUCTIVE SLEEP APNEA): ICD-10-CM

## 2017-05-01 PROCEDURE — 99499 UNLISTED E&M SERVICE: CPT | Mod: S$PBB,,, | Performed by: DIETITIAN, REGISTERED

## 2017-05-01 PROCEDURE — 97803 MED NUTRITION INDIV SUBSEQ: CPT | Mod: PBBFAC | Performed by: DIETITIAN, REGISTERED

## 2017-05-01 NOTE — PROGRESS NOTES
Reviewed chart after visit with dietitian. Patient cleared today per dietitian.   Emailed Dr. Estrada for updated clearance in light of recent ER visit for respiratory exacerbation.  Awaiting response to send chart for insurance auth.

## 2017-05-01 NOTE — PROGRESS NOTES
NUTRITION NOTE     Referring Physician: Jordin Cornejo M.D.  Reason for MNT Referral: 3 months Medically Supervised Diet pending Gastric Sleeve     PAST MEDICAL HISTORY:  Patient presents for 3rd visit for MSD with -9 lbs weight loss over the past month; total (-6 lbs) weight loss by making several lifestyle changes. Patient states his struggle has been getting in 6 meals/day. Patient doing well with diet. Patient was out of town for a wedding and did consume some alcoholic beverages during this time. Patient reports he only drinks alcohol for special occassions and is okay with eliminating in preparation for bariatric surgery. Patient doing well overall with diet; continues with 4-6 small meals/day.           Past Medical History:   Diagnosis Date    Benign neoplasm of colon 10/1/2013    Bronchitis chronic      CAD (coronary artery disease) 10/31/2013    COPD (chronic obstructive pulmonary disease)      Emphysema of lung      GERD (gastroesophageal reflux disease)      Hx of colonic polyps      Hypertension      NAFLD (nonalcoholic fatty liver disease) 3/27/2017    SHOLA on CPAP      RLS (restless legs syndrome)      Screen for colon cancer 8/30/2013       CLINICAL DATA:  71 y.o. male.     There were no vitals filed for this visit.     Current Weight: 233 lbs  Weight Change Since Initial Visit: -6 lbs  Ideal Body Weight: 153 lbs  BMI: 35.47     CURRENT DIET:  Regular diet.  Diet Recall: Food records are present.     Brkfst: Muscle Milk, 2 c 1/2 caff,1/2 decaf coffee  Lunch: Chicken salad with lettuce and tomatoe  Dinner: Green salad with shrimp and parmesan cheese, 1 cup of broccoli and cheese, tea & coffee  Snk: SF Jell-O with cool whip or yogurt  Reduced intake of starchy CHO (cereal, toast). Reviewed starchy CHO foods with patient. Patient agreeable to eliminate after surgery.   Diet includes: protein drinks, cereal, salads+protein, egg salad, chicken salad, SF Jell-O,   Meal Pattern:  Improved.  Protein Supplements: 1-2 per day.  Snacking: Adequate. Snacks include healthy choices and sweets.     Vegetables: Likes a variety. Eats daily.  Fruits: Likes a variety. Eats daily.   Beverages: sugar-free beverages, diet tea, 1/2 caff, 1/2 decaf coffee without sugar and 1% milk  Dining Out: Dining out: Weekly. (3-4 times over the past month) Mostly restaurants and take-out. Making healthy choices  Cooking at home: Weekly. (3-4 times/week) Mostly baked and grilled meat, fish and vegetables.     CURRENT EXERCISE:  Adequate  Golf     Vitamins / Minerals / Herbs:   MultiVit Men's One-a-day 50+     Food Allergies:   No known     Social:  Retired.  Alcohol: Socially.  Smoking: None.     ASSESSMENT:  Patient demonstrates some willingness to change lifestyle habits as evidenced by weight loss, daily food logs, dietary changes, including protein drinks, increased fruits, increased vegetables, reduced dining out, better choices when dining out, more food preparation at [employer] and healthier cooking at home.     Doing fairly well with working on greatest challenges (sweets, starchy CHO, portion control and irregular meal patterns).     Adequate calorie intake.  Adequate protein intake.     PLAN:    Patient good candidate for bariatric surgery-sleeve.      SESSION TIME:  30 minutes

## 2017-05-04 ENCOUNTER — TELEPHONE (OUTPATIENT)
Dept: PULMONOLOGY | Facility: CLINIC | Age: 72
End: 2017-05-04

## 2017-05-04 ENCOUNTER — PATIENT MESSAGE (OUTPATIENT)
Dept: PULMONOLOGY | Facility: CLINIC | Age: 72
End: 2017-05-04

## 2017-05-04 DIAGNOSIS — J44.1 CHRONIC OBSTRUCTIVE PULMONARY DISEASE WITH ACUTE EXACERBATION: Primary | ICD-10-CM

## 2017-05-04 RX ORDER — BENZONATATE 100 MG/1
CAPSULE ORAL
COMMUNITY
Start: 2017-04-29 | End: 2017-07-06

## 2017-05-04 RX ORDER — PREDNISONE 20 MG/1
TABLET ORAL
Qty: 30 TABLET | Refills: 0 | Status: SHIPPED | OUTPATIENT
Start: 2017-05-04 | End: 2017-07-06

## 2017-05-04 RX ORDER — CODEINE PHOSPHATE AND GUAIFENESIN 10; 100 MG/5ML; MG/5ML
5-10 SOLUTION ORAL EVERY 6 HOURS PRN
Qty: 180 ML | Refills: 1 | Status: SHIPPED | OUTPATIENT
Start: 2017-05-04 | End: 2017-07-24

## 2017-05-04 RX ORDER — AMOXICILLIN AND CLAVULANATE POTASSIUM 875; 125 MG/1; MG/1
TABLET, FILM COATED ORAL
COMMUNITY
Start: 2017-04-29 | End: 2017-07-06

## 2017-05-04 RX ORDER — HEPATITIS A AND HEPATITIS B (RECOMBINANT) VACCINE 720; 20 [IU]/ML; UG/ML
INJECTION, SUSPENSION INTRAMUSCULAR
COMMUNITY
Start: 2017-04-03 | End: 2017-07-24

## 2017-05-04 NOTE — TELEPHONE ENCOUNTER
Pt reports continued chest congestion and shortness of breath.  He is currently taking Augmentin and recently completed a short course of Prednisone. Sputum now clear.  To begin repeat course of Prednisone and complete the Augmentin.

## 2017-06-14 ENCOUNTER — DOCUMENTATION ONLY (OUTPATIENT)
Dept: BARIATRICS | Facility: CLINIC | Age: 72
End: 2017-06-14

## 2017-06-14 ENCOUNTER — PATIENT MESSAGE (OUTPATIENT)
Dept: BARIATRICS | Facility: CLINIC | Age: 72
End: 2017-06-14

## 2017-06-22 ENCOUNTER — TELEPHONE (OUTPATIENT)
Dept: BARIATRICS | Facility: CLINIC | Age: 72
End: 2017-06-22

## 2017-06-22 DIAGNOSIS — J43.2 CENTRILOBULAR EMPHYSEMA: ICD-10-CM

## 2017-06-22 DIAGNOSIS — I10 HYPERTENSION, BENIGN: ICD-10-CM

## 2017-06-22 DIAGNOSIS — K76.0 NAFLD (NONALCOHOLIC FATTY LIVER DISEASE): ICD-10-CM

## 2017-06-22 DIAGNOSIS — Z98.84 STATUS POST LAPAROSCOPIC SLEEVE GASTRECTOMY: Primary | ICD-10-CM

## 2017-06-22 DIAGNOSIS — I25.10 CORONARY ARTERY DISEASE, ANGINA PRESENCE UNSPECIFIED, UNSPECIFIED VESSEL OR LESION TYPE, UNSPECIFIED WHETHER NATIVE OR TRANSPLANTED HEART: ICD-10-CM

## 2017-06-22 DIAGNOSIS — E66.09 NON MORBID OBESITY DUE TO EXCESS CALORIES: ICD-10-CM

## 2017-06-22 DIAGNOSIS — G25.81 RESTLESS LEG SYNDROME: ICD-10-CM

## 2017-06-22 DIAGNOSIS — Z79.899 OTHER LONG TERM (CURRENT) DRUG THERAPY: ICD-10-CM

## 2017-06-22 DIAGNOSIS — Z01.818 PREOP TESTING: ICD-10-CM

## 2017-06-22 DIAGNOSIS — K21.9 GASTROESOPHAGEAL REFLUX DISEASE, ESOPHAGITIS PRESENCE NOT SPECIFIED: ICD-10-CM

## 2017-06-22 DIAGNOSIS — G47.33 OSA (OBSTRUCTIVE SLEEP APNEA): ICD-10-CM

## 2017-06-22 DIAGNOSIS — M54.30 SCIATICA, UNSPECIFIED LATERALITY: ICD-10-CM

## 2017-06-22 NOTE — TELEPHONE ENCOUNTER
Received insurance auth for surgery- reviewed chart. Surgery and lab orders entered and pending.   Reviewed history of benign colon mass and hemicolectomy in 2103 with TRISTA Courtney.  She requested a colonoscopy and colorectal clearance.  Attempted to reach patient.  No answer.  Left message on home phone.  Awaiting callback

## 2017-06-23 ENCOUNTER — CLINICAL SUPPORT (OUTPATIENT)
Dept: AUDIOLOGY | Facility: CLINIC | Age: 72
End: 2017-06-23

## 2017-06-23 ENCOUNTER — CLINICAL SUPPORT (OUTPATIENT)
Dept: AUDIOLOGY | Facility: CLINIC | Age: 72
End: 2017-06-23
Payer: MEDICARE

## 2017-06-23 ENCOUNTER — OFFICE VISIT (OUTPATIENT)
Dept: OTOLARYNGOLOGY | Facility: CLINIC | Age: 72
End: 2017-06-23
Payer: MEDICARE

## 2017-06-23 VITALS
SYSTOLIC BLOOD PRESSURE: 156 MMHG | DIASTOLIC BLOOD PRESSURE: 90 MMHG | BODY MASS INDEX: 35.2 KG/M2 | HEART RATE: 81 BPM | WEIGHT: 231.5 LBS

## 2017-06-23 DIAGNOSIS — J34.3 NASAL TURBINATE HYPERTROPHY: ICD-10-CM

## 2017-06-23 DIAGNOSIS — H90.A31 MIXED CONDUCTIVE AND SENSORINEURAL HEARING LOSS OF RIGHT EAR WITH RESTRICTED HEARING OF LEFT EAR: Primary | ICD-10-CM

## 2017-06-23 DIAGNOSIS — H65.91 RIGHT SEROUS OTITIS MEDIA, UNSPECIFIED CHRONICITY: ICD-10-CM

## 2017-06-23 PROCEDURE — 99214 OFFICE O/P EST MOD 30 MIN: CPT | Mod: S$PBB,,, | Performed by: NURSE PRACTITIONER

## 2017-06-23 PROCEDURE — 1159F MED LIST DOCD IN RCRD: CPT | Mod: ,,, | Performed by: NURSE PRACTITIONER

## 2017-06-23 PROCEDURE — 99999 PR PBB SHADOW E&M-EST. PATIENT-LVL IV: CPT | Mod: PBBFAC,,, | Performed by: NURSE PRACTITIONER

## 2017-06-23 PROCEDURE — 99499 UNLISTED E&M SERVICE: CPT | Mod: S$GLB,,, | Performed by: AUDIOLOGIST-HEARING AID FITTER

## 2017-06-23 PROCEDURE — 92504 EAR MICROSCOPY EXAMINATION: CPT | Mod: PBBFAC | Performed by: NURSE PRACTITIONER

## 2017-06-23 PROCEDURE — 92504 EAR MICROSCOPY EXAMINATION: CPT | Mod: S$PBB,,, | Performed by: NURSE PRACTITIONER

## 2017-06-23 PROCEDURE — 99214 OFFICE O/P EST MOD 30 MIN: CPT | Mod: PBBFAC | Performed by: NURSE PRACTITIONER

## 2017-06-23 NOTE — TELEPHONE ENCOUNTER
Spoke with patient.  Notified him he needed a repeat colonoscopy and colorectal clearance prior to surgery.  Patient will call his doctor to schedule.

## 2017-06-23 NOTE — PROGRESS NOTES
Subjective:       Patient ID: Jordin Allen Jr. is a 71 y.o. male.    Chief Complaint: Hearing Loss    Hearing Loss:   Chronicity:  Chronic  Onset:  Over 10 years ago  Progression since onset:  Gradually worsening  Frequency:  Constantly  Severity:  Severe  Hearing loss characteristics:  Severe, difficult on telephone, trouble hearing TV and worse background noise   Associated symptoms: tinnitus.  No dizziness, no vertigo, no ear congestion, no ear pain, no fever, no headaches, no buzzing, no rhinorrhea, no aural fullness, no fluctuance, no imbalance, no otalgia, no otorrhea, no facial weakness, no visual disturbances and not masked by noise.  Aggravated by:  Nothing  Treatments tried:  Hearing aids (hearing aids x seven years)  Improvement on treatment:  Mild   PMH includes: neurologic disease (sciatica and SHOLA), ear tubes, ear infections and recent URI.  No stroke, TIA, or systemic emboli, no noise exposure, no dizziness, no ear surgery, no environmental allergies and no ototoxic drugs.       Past Medical History: Patient has a past medical history of Benign neoplasm of colon (10/1/2013); Bronchitis chronic; CAD (coronary artery disease) (10/31/2013); COPD (chronic obstructive pulmonary disease); Emphysema of lung; GERD (gastroesophageal reflux disease); colonic polyps; Hypertension; NAFLD (nonalcoholic fatty liver disease) (3/27/2017); SHOLA on CPAP; RLS (restless legs syndrome); and Screen for colon cancer (8/30/2013).    Past Surgical History: Patient has a past surgical history that includes Cataract extraction, bilateral; bilateral foot surgery (1993); Colon surgery (2013); and Cardiac catheterization (2013).    Social History: Patient reports that he quit smoking about 4 years ago. His smoking use included Cigarettes. He has a 40.00 pack-year smoking history. He has never used smokeless tobacco. He reports that he drinks alcohol. He reports that he does not use drugs.    Family History: family history  includes Heart attack in his mother; Heart disease in his mother; Hypertension in his mother.    Medications:   Current Outpatient Prescriptions   Medication Sig    albuterol 90 mcg/actuation inhaler Inhale 2 puffs into the lungs every 4 (four) hours as needed for Wheezing.    albuterol-ipratropium 2.5mg-0.5mg/3mL (DUO-NEB) 0.5 mg-3 mg(2.5 mg base)/3 mL nebulizer solution INHALE 1 VIAL VIA NEBULIZER EVERY 6 HOURS AS NEEDED    amitriptyline (ELAVIL) 25 MG tablet TAKE 1 TABLET BY MOUTH EVERY DAY    amoxicillin-clavulanate 875-125mg (AUGMENTIN) 875-125 mg per tablet     aspirin (ECOTRIN) 81 MG EC tablet Take 81 mg by mouth once.    atorvastatin (LIPITOR) 80 MG tablet TAKE 1 TABLET (80 MG TOTAL) BY MOUTH ONCE DAILY.    azithromycin (ZITHROMAX Z-JOSE RAMON) 250 MG tablet 2 tablets initially, then 1 tab daily afterward.    benzonatate (TESSALON) 100 MG capsule     BETA-CAROTENE,A, W-C & E/MIN (OCUVITE ORAL) Take by mouth once daily.     DALIRESP 500 mcg Tab TAKE 1 TABLET BY MOUTH EVERY DAY    echinacea 400 mg Cap Take by mouth once daily.     fluticasone (FLONASE) 50 mcg/actuation nasal spray INSTILL 1 SPRAY IN EACH NOSTRIL ONCE DAILY.    guaifenesin-codeine 100-10 mg/5 ml (TUSSI-ORGANIDIN NR)  mg/5 mL syrup Take 5-10 mLs by mouth every 6 (six) hours as needed for Cough.    hydrochlorothiazide (HYDRODIURIL) 25 MG tablet Take 0.5 tablets (12.5 mg total) by mouth once daily.    latanoprost 0.005 % ophthalmic solution Place 1 drop into both eyes once daily.     losartan (COZAAR) 100 MG tablet TAKE 1 TABLET BY MOUTH ONCE A DAY    nitroGLYCERIN (NITROSTAT) 0.4 MG SL tablet Place 0.4 mg under the tongue every 5 (five) minutes as needed.    omeprazole (PRILOSEC) 20 MG capsule TAKE 1 CAPSULE BY MOUTH EVERY DAY    predniSONE (DELTASONE) 10 MG tablet Take 1 tab per day x 7-10 days, then resume maintenance dose of Prednisone. Take with food    predniSONE (DELTASONE) 20 MG tablet 3 tabs QAM x 5, then 2 tabs QAM x  5, then 1 tab QAM x5, then stop.  Take with food.    predniSONE (DELTASONE) 5 MG tablet Take 1 tab PO every other day.  Take with food.    SPIRIVA WITH HANDIHALER 18 mcg inhalation capsule INHALE 1 CAPSULE WITH INHALATION DEVICE ONCE DAILY    SYMBICORT 160-4.5 mcg/actuation HFAA INHALE 2 PUFFS INTO THE LUNGS EVERY 12 HOURS. RINSE MOUTH AFTER USE    TWINRIX, PF, [address] unit -20 mcg/mL Syrg suspension      No current facility-administered medications for this visit.        Allergies: Patient is allergic to brilinta [ticagrelor]; lisinopril; and metoprolol succinate.    Review of Systems   Constitutional: Negative for activity change, fatigue, fever and unexpected weight change.   HENT: Positive for hearing loss and tinnitus. Negative for congestion, dental problem, ear discharge, ear pain, facial swelling, mouth sores, nosebleeds, postnasal drip, rhinorrhea, sinus pressure, sneezing, sore throat, trouble swallowing and voice change.    Eyes: Negative for pain and visual disturbance.   Respiratory: Negative for cough, choking, chest tightness, shortness of breath, wheezing and stridor.    Cardiovascular: Negative for chest pain.   Gastrointestinal: Negative for nausea and vomiting.   Musculoskeletal: Negative for gait problem, neck pain and neck stiffness.   Skin: Negative for color change, rash and wound.   Allergic/Immunologic: Negative for environmental allergies.   Neurological: Negative for dizziness, vertigo, seizures, syncope, facial asymmetry, speech difficulty, weakness, light-headedness, numbness and headaches.   Psychiatric/Behavioral: Negative for agitation, confusion and decreased concentration. The patient is not nervous/anxious.        Objective:       BP (!) 156/90 (BP Location: Right arm, Patient Position: Sitting, BP Method: Automatic)   Pulse 81   Wt 105 kg (231 lb 7.7 oz)   BMI 35.20 kg/m²     Physical Exam   Constitutional: He is oriented to person, place, and time. He appears  well-developed and well-nourished.   HENT:   Head: Normocephalic and atraumatic. Not macrocephalic and not microcephalic. Head is without raccoon's eyes, without Pulido's sign, without abrasion, without contusion, without laceration, without right periorbital erythema and without left periorbital erythema. Hair is normal.   Right Ear: External ear and ear canal normal. No lacerations. No drainage, swelling or tenderness. No foreign bodies. No mastoid tenderness. Tympanic membrane is not injected, not scarred, not perforated, not erythematous, not retracted and not bulging. Tympanic membrane mobility is normal. A middle ear effusion is present. No hemotympanum. Decreased hearing is noted.   Left Ear: External ear and ear canal normal. No lacerations. No drainage, swelling or tenderness. No foreign bodies. No mastoid tenderness. Tympanic membrane is scarred. Tympanic membrane is not injected, not perforated, not erythematous, not retracted and not bulging. Tympanic membrane mobility is normal.  No middle ear effusion. No hemotympanum. Decreased hearing is noted.   Nose: Nose normal. No mucosal edema, rhinorrhea, nose lacerations, sinus tenderness or nasal deformity.   Mouth/Throat: Uvula is midline.   Eyes: Conjunctivae, EOM and lids are normal. Pupils are equal, round, and reactive to light.   Neck: Trachea normal and normal range of motion. Neck supple. No spinous process tenderness and no muscular tenderness present. No neck rigidity. No edema, no erythema and normal range of motion present. No thyroid mass and no thyromegaly present.   Pulmonary/Chest: Effort normal.   Abdominal: Soft.   Musculoskeletal: Normal range of motion.   Lymphadenopathy:        Head (right side): No submental, no submandibular, no tonsillar, no preauricular and no posterior auricular adenopathy present.        Head (left side): No submental, no submandibular, no tonsillar, no preauricular, no posterior auricular and no occipital  adenopathy present.     He has no cervical adenopathy.   Neurological: He is alert and oriented to person, place, and time. No cranial nerve deficit or sensory deficit.   Skin: Skin is warm and dry.   Psychiatric: He has a normal mood and affect. His behavior is normal. Judgment and thought content normal.   Nursing note and vitals reviewed.      As a result of this patients history and examination findings, a comprehensive audiogram was ordered to determine the level of hearing/hearing loss.        Assessment:       1. Mixed conductive and sensorineural hearing loss of right ear with restricted hearing of left ear    2. Right serous otitis media, unspecified chronicity    3. Nasal turbinate hypertrophy        Plan:       Audiogram Reviewed.  Medrol dose pack (called into Pharmacy).  OTC Flonase daily (spray laterally).  OTC Mucinex.  Hydration.  Politzerization in office attempted, with mild relief.  Ears and Altitude Pamphlet provided.  No airplane flying.  F/u with Dr. Duarte.  RTC in 1 month or prn sooner if symptoms worsen.

## 2017-06-23 NOTE — PATIENT INSTRUCTIONS
Audiogram Reviewed.  Medrol dose pack (called into Pharmacy).  OTC Flonase daily (spray laterally).  OTC Mucinex.  Hydration.  Politzerization in office attempted with mild relief.  Ears and Altitude Pamphlet provided.  No airplane flying.  F/u with Dr. Duarte.  RTC in 1 month or prn sooner if symptoms worsen.

## 2017-06-23 NOTE — PROGRESS NOTES
Jordin Allen Jr. was seen in the clinic today for a hearing aid consult.     Pricing and styles were discussed. Mr. Allen decided to order a pair of Audeo B70-R hearing aids in P7 with #2 standard receivers and power domes. The contract was signed and Mr. Allen was given a copy.     An appointment was scheduled for Mr. Allen to return to the clinic to  the hearing aids.

## 2017-06-23 NOTE — PROGRESS NOTES
Michi Allen was seen in the clinic today for a hearing evaluation. Mr. Allen reported hearing loss worse in his right ear. He has bilateral hearing aids that are about 7 years old.      Audiological testing revealed a mild to moderate sensorineural hearing loss in the left ear and a mild to severe mixed hearing loss in the right ear. A speech reception threshold was obtained at 45 dBHL in the left ear and 55 dBHL in the right ear. Speech discrimination was 96% in the left ear and 84% in the right ear.    Tympanometry revealed rounded Type A tympanograms in both ears.    Recommendations:  1. Otologic evaluation  2. Annual hearing evaluation  3. Hearing Aid Consult

## 2017-06-28 RX ORDER — AMITRIPTYLINE HYDROCHLORIDE 25 MG/1
TABLET, FILM COATED ORAL
Qty: 30 TABLET | Refills: 6 | Status: SHIPPED | OUTPATIENT
Start: 2017-06-28 | End: 2018-02-12 | Stop reason: SDUPTHER

## 2017-06-29 RX ORDER — ROFLUMILAST 500 UG/1
TABLET ORAL
Qty: 30 TABLET | Refills: 6 | Status: SHIPPED | OUTPATIENT
Start: 2017-06-29 | End: 2017-12-27 | Stop reason: SDUPTHER

## 2017-06-30 ENCOUNTER — CLINICAL SUPPORT (OUTPATIENT)
Dept: AUDIOLOGY | Facility: CLINIC | Age: 72
End: 2017-06-30

## 2017-06-30 DIAGNOSIS — H90.3 SENSORINEURAL HEARING LOSS, BILATERAL: Primary | ICD-10-CM

## 2017-07-06 ENCOUNTER — OFFICE VISIT (OUTPATIENT)
Dept: OTOLARYNGOLOGY | Facility: CLINIC | Age: 72
End: 2017-07-06
Payer: MEDICARE

## 2017-07-06 VITALS — SYSTOLIC BLOOD PRESSURE: 119 MMHG | DIASTOLIC BLOOD PRESSURE: 86 MMHG | HEART RATE: 95 BPM | TEMPERATURE: 97 F

## 2017-07-06 DIAGNOSIS — H90.A31 MIXED CONDUCTIVE AND SENSORINEURAL HEARING LOSS OF RIGHT EAR WITH RESTRICTED HEARING OF LEFT EAR: ICD-10-CM

## 2017-07-06 DIAGNOSIS — K11.21 ACUTE PAROTITIS: ICD-10-CM

## 2017-07-06 DIAGNOSIS — H69.91 EUSTACHIAN TUBE DYSFUNCTION, RIGHT: ICD-10-CM

## 2017-07-06 DIAGNOSIS — Z97.4 WEARS HEARING AID: ICD-10-CM

## 2017-07-06 DIAGNOSIS — H65.01 ACUTE SEROUS OTITIS MEDIA, RIGHT EAR: Primary | ICD-10-CM

## 2017-07-06 PROCEDURE — 99999 PR PBB SHADOW E&M-EST. PATIENT-LVL III: CPT | Mod: PBBFAC,,, | Performed by: OTOLARYNGOLOGY

## 2017-07-06 PROCEDURE — 99213 OFFICE O/P EST LOW 20 MIN: CPT | Mod: S$PBB,,, | Performed by: OTOLARYNGOLOGY

## 2017-07-06 PROCEDURE — 1159F MED LIST DOCD IN RCRD: CPT | Mod: ,,, | Performed by: OTOLARYNGOLOGY

## 2017-07-06 PROCEDURE — 1126F AMNT PAIN NOTED NONE PRSNT: CPT | Mod: ,,, | Performed by: OTOLARYNGOLOGY

## 2017-07-06 PROCEDURE — 99213 OFFICE O/P EST LOW 20 MIN: CPT | Mod: PBBFAC | Performed by: OTOLARYNGOLOGY

## 2017-07-06 RX ORDER — METHYLPREDNISOLONE 4 MG/1
TABLET ORAL
COMMUNITY
Start: 2017-06-23 | End: 2017-08-08 | Stop reason: ALTCHOICE

## 2017-07-06 RX ORDER — HYDROCHLOROTHIAZIDE 25 MG/1
12.5 TABLET ORAL DAILY
Qty: 30 TABLET | Refills: 6 | Status: SHIPPED | OUTPATIENT
Start: 2017-07-06 | End: 2018-08-27 | Stop reason: SDUPTHER

## 2017-07-06 RX ORDER — AMOXICILLIN AND CLAVULANATE POTASSIUM 875; 125 MG/1; MG/1
1 TABLET, FILM COATED ORAL 2 TIMES DAILY
Qty: 20 TABLET | Refills: 0 | Status: SHIPPED | OUTPATIENT
Start: 2017-07-06 | End: 2017-07-16

## 2017-07-06 NOTE — PROGRESS NOTES
CC: Right parotid swelling, blocked sensation in right ear  HPI:Mr. Allen is a 71-year-old  gentleman who is a little who is evaluated by our ENT departmental nurse practitioner 6/23/17.    He was diagnosed with a right serous otitis media condition treated with a Medrol Dosepak, Flonase and Mucinex as well as hydration.  Politzerization was attempted.  Audiometry indicated a right ear mixed hearing loss and a left ear sensorineural hearing loss.  SRT scores measured 55 dB for the right ear versus 45 dB for the left.  There is a greater negative pressure measured for the left ear compared to the right per tympanometry.    The patient had been wearing hearing aids in both ears for 7 years. He is a bit perturbed that his new Phonak hearing aids obtained several days ago are not working properly now.  He has a standing appointment with the  audiologist later this afternoon.    He completed a course of Augmentin about 6 weeks ago for URI symptoms.  He tolerated this medication well.    He has a history of COPD he takes prednisone 5 mg every other day.    His medical problem list includes SHOLA, restless leg syndrome, GERD, hypertension, sciatica, coronary artery disease, central lobular emphysema, obesity him a history of smoking, COPD, non-alcoholic fatty liver disease    ALLERGIES: Brilinta, lisinopril, metoprolol  PE: Blood pressure 119/86 pulse 95 temperature 97.3  Gen.: Alert and oriented gentleman in no acute distress  Right ear is examined under the microscope in the micro-procedure room.  Right eardrum remains retracted and is obvious evidence of yellow serous fluid trapped behind the eardrum.  Left eardrum is clear left middle ear space appears aerated behind the slightly opaque eardrum.  The neck is palpated; there is evidence for induration in the right talar parotid area without evidence of a tamra lesion there.    Bimanual palpation indicates no evidence of a sialoliths in the right ductal system.   The flooor of the mouth is supple.  There is no evidence of left parotitis or submandibular sialadenitis, left or right.  Oropharyngeal exam is unremarkable for inflammation infection or ulceration.  Audiometry was not performed today.    DIAGNOSIS:     ICD-10-CM ICD-9-CM    1. Acute serous otitis media, right ear H65.01 381.01 amoxicillin-clavulanate 875-125mg (AUGMENTIN) 875-125 mg per tablet   2. Mixed conductive and sensorineural hearing loss of right ear with restricted hearing of left ear H90.A31 389.22 amoxicillin-clavulanate 875-125mg (AUGMENTIN) 875-125 mg per tablet   3. Eustachian tube dysfunction, right H69.81 381.81    4. Acute parotitis K11.21 527.2 amoxicillin-clavulanate 875-125mg (AUGMENTIN) 875-125 mg per tablet    right tail area   5. Wears hearing aid Z97.4 V45.89     new Phonak devices, bilateral     PLAN: Politzerization attempted: no AD middle ear aeration  Rx for Augmentin 875/125 # 20 ; take BID with food  RTC 2-3 weeks  Hydration/gland massage,sialogogoues may help  Pt. directed to audiologist office re: hearing aid troubleshooting

## 2017-07-10 ENCOUNTER — TELEPHONE (OUTPATIENT)
Dept: ENDOSCOPY | Facility: HOSPITAL | Age: 72
End: 2017-07-10

## 2017-07-10 NOTE — TELEPHONE ENCOUNTER
Patient called Endoscopy scheduling to schedule his colonoscopy.  He stated that he was informed by bariatrics-that he needed to have a repeat colonoscopy.  I explained to the patient that there was no order in for the colonoscopy.  He requested sending a message to bariatrics to have order placed so that he can schedule the procedure.  Please advise.  Thank you.  Salma  Endoscopy

## 2017-07-12 NOTE — TELEPHONE ENCOUNTER
Spoke with patient.  Notified Mr. Allen that he was suppose to see his colo-rectal doctor to order his updated colonoscopy and to provide clearance for his upcoming surgery.   Patient verbalized understanding and will call colorectal surgery to schedule appt with Dr. Mott.

## 2017-07-17 ENCOUNTER — CLINICAL SUPPORT (OUTPATIENT)
Dept: AUDIOLOGY | Facility: CLINIC | Age: 72
End: 2017-07-17

## 2017-07-17 DIAGNOSIS — H90.3 SENSORINEURAL HEARING LOSS, BILATERAL: Primary | ICD-10-CM

## 2017-07-17 PROCEDURE — 99499 UNLISTED E&M SERVICE: CPT | Mod: S$GLB,,, | Performed by: OTOLARYNGOLOGY

## 2017-07-17 NOTE — PROGRESS NOTES
Michi Allen Jr. was seen in the clinic today for his first follow-up with his Phonak Audeo B70-R hearing aids.    Overall, Mr. Allen was pleased with his hearing aids. Proper insertion as well as care and maintenance were reviewed again with Mr. Schwartz. He was encouraged to return to the clinic in one year so we could re-check his hearing and make any necessary adjustments to the hearing aids.    Mr. Allen will call the clinic for a follow-up appointment as needed

## 2017-07-19 ENCOUNTER — PATIENT MESSAGE (OUTPATIENT)
Dept: ADMINISTRATIVE | Facility: OTHER | Age: 72
End: 2017-07-19

## 2017-07-24 ENCOUNTER — PATIENT MESSAGE (OUTPATIENT)
Dept: BARIATRICS | Facility: CLINIC | Age: 72
End: 2017-07-24

## 2017-07-24 ENCOUNTER — OFFICE VISIT (OUTPATIENT)
Dept: OTOLARYNGOLOGY | Facility: CLINIC | Age: 72
End: 2017-07-24
Payer: MEDICARE

## 2017-07-24 ENCOUNTER — DOCUMENTATION ONLY (OUTPATIENT)
Dept: BARIATRICS | Facility: CLINIC | Age: 72
End: 2017-07-24

## 2017-07-24 VITALS — SYSTOLIC BLOOD PRESSURE: 124 MMHG | DIASTOLIC BLOOD PRESSURE: 89 MMHG | TEMPERATURE: 97 F | HEART RATE: 81 BPM

## 2017-07-24 DIAGNOSIS — Z86.69 HISTORY OF SEROUS OTITIS MEDIA: ICD-10-CM

## 2017-07-24 DIAGNOSIS — R60.9 PAROTID SWELLING: ICD-10-CM

## 2017-07-24 PROCEDURE — 99213 OFFICE O/P EST LOW 20 MIN: CPT | Mod: PBBFAC | Performed by: OTOLARYNGOLOGY

## 2017-07-24 PROCEDURE — 1126F AMNT PAIN NOTED NONE PRSNT: CPT | Mod: ,,, | Performed by: OTOLARYNGOLOGY

## 2017-07-24 PROCEDURE — 1159F MED LIST DOCD IN RCRD: CPT | Mod: ,,, | Performed by: OTOLARYNGOLOGY

## 2017-07-24 PROCEDURE — 99213 OFFICE O/P EST LOW 20 MIN: CPT | Mod: S$PBB,,, | Performed by: OTOLARYNGOLOGY

## 2017-07-24 PROCEDURE — 99999 PR PBB SHADOW E&M-EST. PATIENT-LVL III: CPT | Mod: PBBFAC,,, | Performed by: OTOLARYNGOLOGY

## 2017-07-24 NOTE — PROGRESS NOTES
"Subjective:       Patient ID: Jordin Allen Jr. is a 71 y.o. male.    Chief Complaint: No chief complaint on file.    HPI: Mr. Allen is an 71 year old CM who contracted an URI after completing a flight home in April this year. He indicated a clogged sensation in his right ear initially which is now "opening up"  He admits that he can hear better in his right ear now..  I recently examined him 7/6/17 for the right ear acute serous otitis media condition previously treated by our ENT departmental nurse practioner 6/23/17 with a Medrol Dosepak, Flonase and Mucinex.  I treated him with a course of Augmentin for his mixed right year hearing loss in right ear eustachian tube dysfunction problem as well as evidence of acute parotitis in the right talar parotid area.  Patient indicates wearing hearing aids in both ears for 7 years.  He is a history of COPD treated with prednisone every other day.  He was worked up for right parotid swelling with an MRI scan orderd by Dr. Oconnor in the p[ast   as well as a lacrimal probing procedure performed by judie millan I his office.  He has had no right parotid swelling lately.    His medical problem list includes SHOLA, restless leg syndrome, GERD, hypertension, sciatica, coronary artery disease, centrilobular emphysema, obesity, COPD  and nonalcoholic fatty liver disease  ROS not completed today  Review of Systems     The patient has completed an audiometric study performed by the [facility] audiology service.  The study is duplicate below and the results are reviewed with the patient  Objective:            /89 P 81 T 97.4  Gen:    Physical Exam   HENT:   Ears:        Assessment:       1. Asymmetrical hearing loss of both ears    2. Parotid swelling    3. History of serous otitis media        Plan:     Audiometry reviewed  Gentle middle ear insufflation techniques encouraged prn  Politzerization attempted today  Hydration encouraged re: right parotid/salivary function  RTC prn       "

## 2017-07-24 NOTE — PATIENT INSTRUCTIONS
Audiometry reviewed  Gentle middle ear insufflation techniques encouraged prn  Politzerization attempted today  Hydration encouraged re: right parotid/salivary function  RTC prn

## 2017-08-08 ENCOUNTER — LAB VISIT (OUTPATIENT)
Dept: LAB | Facility: HOSPITAL | Age: 72
End: 2017-08-08
Attending: INTERNAL MEDICINE
Payer: MEDICARE

## 2017-08-08 ENCOUNTER — OFFICE VISIT (OUTPATIENT)
Dept: INTERNAL MEDICINE | Facility: CLINIC | Age: 72
End: 2017-08-08
Payer: MEDICARE

## 2017-08-08 VITALS
DIASTOLIC BLOOD PRESSURE: 81 MMHG | OXYGEN SATURATION: 95 % | WEIGHT: 238.13 LBS | HEIGHT: 69 IN | RESPIRATION RATE: 18 BRPM | HEART RATE: 77 BPM | BODY MASS INDEX: 35.27 KG/M2 | SYSTOLIC BLOOD PRESSURE: 142 MMHG | TEMPERATURE: 98 F

## 2017-08-08 DIAGNOSIS — J44.9 CHRONIC OBSTRUCTIVE PULMONARY DISEASE, UNSPECIFIED COPD TYPE: ICD-10-CM

## 2017-08-08 DIAGNOSIS — G47.33 OSA (OBSTRUCTIVE SLEEP APNEA): ICD-10-CM

## 2017-08-08 DIAGNOSIS — I10 ESSENTIAL HYPERTENSION, BENIGN: ICD-10-CM

## 2017-08-08 DIAGNOSIS — Z12.5 SCREENING PSA (PROSTATE SPECIFIC ANTIGEN): ICD-10-CM

## 2017-08-08 DIAGNOSIS — I10 HTN (HYPERTENSION), BENIGN: Primary | ICD-10-CM

## 2017-08-08 DIAGNOSIS — I25.10 CORONARY ARTERY DISEASE INVOLVING NATIVE CORONARY ARTERY OF NATIVE HEART WITHOUT ANGINA PECTORIS: Chronic | ICD-10-CM

## 2017-08-08 LAB
ALBUMIN SERPL BCP-MCNC: 3.4 G/DL
ALP SERPL-CCNC: 99 U/L
ALT SERPL W/O P-5'-P-CCNC: 26 U/L
ANION GAP SERPL CALC-SCNC: 8 MMOL/L
AST SERPL-CCNC: 18 U/L
BASOPHILS # BLD AUTO: 0.05 K/UL
BASOPHILS NFR BLD: 0.6 %
BILIRUB SERPL-MCNC: 1.1 MG/DL
BUN SERPL-MCNC: 26 MG/DL
CALCIUM SERPL-MCNC: 9.5 MG/DL
CHLORIDE SERPL-SCNC: 108 MMOL/L
CHOLEST/HDLC SERPL: 2.2 {RATIO}
CO2 SERPL-SCNC: 28 MMOL/L
COMPLEXED PSA SERPL-MCNC: 1.9 NG/ML
CREAT SERPL-MCNC: 1 MG/DL
DIFFERENTIAL METHOD: ABNORMAL
EOSINOPHIL # BLD AUTO: 0.4 K/UL
EOSINOPHIL NFR BLD: 4.6 %
ERYTHROCYTE [DISTWIDTH] IN BLOOD BY AUTOMATED COUNT: 14.7 %
EST. GFR  (AFRICAN AMERICAN): >60 ML/MIN/1.73 M^2
EST. GFR  (NON AFRICAN AMERICAN): >60 ML/MIN/1.73 M^2
GLUCOSE SERPL-MCNC: 106 MG/DL
HCT VFR BLD AUTO: 41.9 %
HDL/CHOLESTEROL RATIO: 46.2 %
HDLC SERPL-MCNC: 43 MG/DL
HDLC SERPL-MCNC: 93 MG/DL
HGB BLD-MCNC: 13.8 G/DL
LDLC SERPL CALC-MCNC: 35.4 MG/DL
LYMPHOCYTES # BLD AUTO: 2.1 K/UL
LYMPHOCYTES NFR BLD: 24 %
MCH RBC QN AUTO: 27.2 PG
MCHC RBC AUTO-ENTMCNC: 32.9 G/DL
MCV RBC AUTO: 83 FL
MONOCYTES # BLD AUTO: 0.6 K/UL
MONOCYTES NFR BLD: 7.2 %
NEUTROPHILS # BLD AUTO: 5.6 K/UL
NEUTROPHILS NFR BLD: 63.1 %
NONHDLC SERPL-MCNC: 50 MG/DL
PLATELET # BLD AUTO: 184 K/UL
PMV BLD AUTO: 10.3 FL
POTASSIUM SERPL-SCNC: 4.2 MMOL/L
PROT SERPL-MCNC: 6.5 G/DL
RBC # BLD AUTO: 5.07 M/UL
SODIUM SERPL-SCNC: 144 MMOL/L
TRIGL SERPL-MCNC: 73 MG/DL
TSH SERPL DL<=0.005 MIU/L-ACNC: 0.91 UIU/ML
WBC # BLD AUTO: 8.78 K/UL

## 2017-08-08 PROCEDURE — 99999 PR PBB SHADOW E&M-EST. PATIENT-LVL III: CPT | Mod: PBBFAC,,, | Performed by: INTERNAL MEDICINE

## 2017-08-08 PROCEDURE — 1126F AMNT PAIN NOTED NONE PRSNT: CPT | Mod: ,,, | Performed by: INTERNAL MEDICINE

## 2017-08-08 PROCEDURE — 3008F BODY MASS INDEX DOCD: CPT | Mod: ,,, | Performed by: INTERNAL MEDICINE

## 2017-08-08 PROCEDURE — 1159F MED LIST DOCD IN RCRD: CPT | Mod: ,,, | Performed by: INTERNAL MEDICINE

## 2017-08-08 PROCEDURE — 99213 OFFICE O/P EST LOW 20 MIN: CPT | Mod: PBBFAC,PO | Performed by: INTERNAL MEDICINE

## 2017-08-08 PROCEDURE — 99214 OFFICE O/P EST MOD 30 MIN: CPT | Mod: S$PBB,,, | Performed by: INTERNAL MEDICINE

## 2017-08-08 PROCEDURE — 3077F SYST BP >= 140 MM HG: CPT | Mod: ,,, | Performed by: INTERNAL MEDICINE

## 2017-08-08 PROCEDURE — 3079F DIAST BP 80-89 MM HG: CPT | Mod: ,,, | Performed by: INTERNAL MEDICINE

## 2017-08-10 RX ORDER — LOSARTAN POTASSIUM 100 MG/1
TABLET ORAL
Qty: 30 TABLET | Refills: 3 | Status: SHIPPED | OUTPATIENT
Start: 2017-08-10 | End: 2017-12-05 | Stop reason: SDUPTHER

## 2017-08-11 ENCOUNTER — TELEPHONE (OUTPATIENT)
Dept: INTERNAL MEDICINE | Facility: CLINIC | Age: 72
End: 2017-08-11

## 2017-08-11 NOTE — PROGRESS NOTES
History of present illness:  71-year-old male in today for general health review and also following up on several chronic medical conditions including COPD, obstructive sleep apnea, CAD, hypertension and others.  He reports that overall is doing well.  He is requesting some new orders for his CPAP equipment indicating that he is having to change providers for such.  He is using his CPAP on a nightly basis and expresses considerable benefit from such.  He takes all of his medications as directed.  He denies any issues with chest pain palpitations syncope or presyncope.  No change in breathing status is stable.  He is followed by pulmonology as well.    Current medications:  All medications are noted reviewed and are electronic medical record medication list.    Review of systems:  General: no fever, chills, generalized body aches. No unexpected weight loss.  Eyes:  No visual disturbances.  HEENT:  No hoarseness, dysphagia, ear pain.  Respiratory:  No cough, no change in breathing status.  Cardiovascular: no chest pain, palpitations, cough, exertional limb pain. No edema.  GI: no nausea, vomiting.  No abdominal pain. No change in bowel habits.  No melena, no hematochezia.  : no dysuria. No change in the color or character of the urine. No urinary frequency.  Musculoskeletal: no new joint pain or swelling.  Neurologic:  No focal neurological complaints.  No headaches.  Skin:  No rashes or other concerns.  Psych:  No emotional issues    Past medical history:  CAD  COPD/emphysema.  GERD.  Hypertension.  Hepatic steatosis.  Restless leg syndrome.  Colon polyps.  Obstructive sleep apnea.    Past surgical history, family medical history, social histories are all noted reviewed in electronic medical record history sections.    Health screenings:  Colonoscopy 2013.  He has had 23 and 13 Valent pneumococcal vaccines.    Physical examination:  GENERAL:  Alert, appropriately groomed, no acute distress.  VS: Blood pressure  taken manually is 138/80.  Respirations are 16 and unlabored.  Other vital signs noted.  Room air O2 saturation 95%  EYES: sclerae white ,nonicteric. PERRL.  HEENT:  Normocephalic. Ear canals and tympanic membranes normal. Mouth and pharynx normal. No thyromegaly. Trachea midline and freely mobile.  LUNGS:  Clear to ascultation and normal to percussion.  CARDIOVASCULAR:  Normal heart sounds.  No significant murmur. Carotids full bilaterally without bruit.  Pedal pulses intact .  No abdominal bruit.  No peripheral extremity edema.  GI: the abdomen is soft, no distension. No masses , tenderness, organomegaly.  Rectal examination normal.  : scrotum, testicles and penis normal. Prostate without nodules or asymmetry.  LYMPHATIC:  No axillary, inguinal , cervical adenopathy.  MUSCULOSKELETAL:  Range of motion, stability and strength of the right and left upper and lower extremities normal. No swollen or tender joints  NEUROLOGIC:  DTR's normal. No gross motor or sensory deficits apparent, gait normal.  SKIN:  No rashes.   MS:  Alert, oriented , affect and mood all appropriate    Data:  Lab data pending from earlier this a.m. include CBC, chemistry profile lipid profile TSH PSA and urinalysis.    Impression:  71-year-old male with several chronic medical conditions.  He has hypertension which is probably reasonably controlled.  COPD with supplemental when necessary home O2.  Obstructive sleep apnea on CPAP with regular compliance and significant benefit.  Restless leg syndrome.  Hepatic steatosis.  Colon polyps.  Obesity with BMI 35.16 with associated comorbidities.  CAD clinically stable.    Plan:  Reviewed lab data when available.  We will fax requested orders for CPAP supplies and is indicated to [facility].  Return to clinic 6 months.

## 2017-08-14 ENCOUNTER — PATIENT MESSAGE (OUTPATIENT)
Dept: INTERNAL MEDICINE | Facility: CLINIC | Age: 72
End: 2017-08-14

## 2017-08-14 ENCOUNTER — TELEPHONE (OUTPATIENT)
Dept: INTERNAL MEDICINE | Facility: CLINIC | Age: 72
End: 2017-08-14

## 2017-08-14 NOTE — TELEPHONE ENCOUNTER
Returned a call to Lily fowler [facility] who advises that they need to have the MDs NPI # on the actual order (to be handled tomorrow once MD returns to clinic) and a copy of the sleep study, as we only faxed the titration study.    Verbalized understanding.    Fax: [phone]    MANISHA message sent to pt to provide an update.

## 2017-08-14 NOTE — TELEPHONE ENCOUNTER
----- Message from Blade Tinsley sent at 8/14/2017  4:20 PM CDT -----  Contact: Lily Barcenas [phone]  Lily states she received a prescription and needs to speak with the nurse and did not give any further details,Please call

## 2017-08-31 RX ORDER — TIOTROPIUM BROMIDE 18 UG/1
CAPSULE ORAL; RESPIRATORY (INHALATION)
Qty: 30 CAPSULE | Refills: 6 | Status: SHIPPED | OUTPATIENT
Start: 2017-08-31 | End: 2018-01-31 | Stop reason: SDUPTHER

## 2017-09-11 ENCOUNTER — PATIENT MESSAGE (OUTPATIENT)
Dept: CARDIOLOGY | Facility: CLINIC | Age: 72
End: 2017-09-11

## 2017-09-11 RX ORDER — ATORVASTATIN CALCIUM 80 MG/1
TABLET, FILM COATED ORAL
Qty: 30 TABLET | Refills: 6 | Status: SHIPPED | OUTPATIENT
Start: 2017-09-11 | End: 2018-04-18 | Stop reason: SDUPTHER

## 2017-09-20 DIAGNOSIS — J43.2 CENTRILOBULAR EMPHYSEMA: Chronic | ICD-10-CM

## 2017-09-21 RX ORDER — PREDNISONE 5 MG/1
TABLET ORAL
Qty: 16 TABLET | Refills: 6 | Status: SHIPPED | OUTPATIENT
Start: 2017-09-21 | End: 2018-04-25 | Stop reason: SDUPTHER

## 2017-10-03 ENCOUNTER — PATIENT MESSAGE (OUTPATIENT)
Dept: PULMONOLOGY | Facility: CLINIC | Age: 72
End: 2017-10-03

## 2017-10-26 ENCOUNTER — OFFICE VISIT (OUTPATIENT)
Dept: PULMONOLOGY | Facility: CLINIC | Age: 72
End: 2017-10-26
Payer: MEDICARE

## 2017-10-26 VITALS
OXYGEN SATURATION: 96 % | HEART RATE: 83 BPM | SYSTOLIC BLOOD PRESSURE: 126 MMHG | DIASTOLIC BLOOD PRESSURE: 82 MMHG | HEIGHT: 71 IN | WEIGHT: 239.19 LBS | BODY MASS INDEX: 33.48 KG/M2 | RESPIRATION RATE: 14 BRPM

## 2017-10-26 DIAGNOSIS — J43.2 CENTRILOBULAR EMPHYSEMA: Primary | Chronic | ICD-10-CM

## 2017-10-26 DIAGNOSIS — I25.10 CORONARY ARTERY DISEASE INVOLVING NATIVE CORONARY ARTERY OF NATIVE HEART WITHOUT ANGINA PECTORIS: Chronic | ICD-10-CM

## 2017-10-26 PROCEDURE — 99213 OFFICE O/P EST LOW 20 MIN: CPT | Mod: PBBFAC | Performed by: INTERNAL MEDICINE

## 2017-10-26 PROCEDURE — 99214 OFFICE O/P EST MOD 30 MIN: CPT | Mod: S$PBB,,, | Performed by: INTERNAL MEDICINE

## 2017-10-26 PROCEDURE — 99999 PR PBB SHADOW E&M-EST. PATIENT-LVL III: CPT | Mod: PBBFAC,,, | Performed by: INTERNAL MEDICINE

## 2017-10-26 NOTE — PROGRESS NOTES
Subjective:       Patient ID: Jordin Allen Jr. is a 72 y.o. male.    Chief Complaint: COPD    HPI Mr. Allen is a 72-year-old former smoker who comes for interval assessment.    Following his visit here in March, he developed an exacerbation of chest   congestion and shortness of breath.  He had to take an increased dose of   prednisone and an antibiotic, and then repeat this treatment before his symptoms   returned to baseline.  He now feels that his respiratory status is stable.  He   remains on Symbicort 160/4.5, Spiriva, Daliresp, and DuoNeb aerosol treatments.    He is currently taking a 5 mg daily maintenance dose of prednisone.    Earlier in the year, Mr. Allen had been considering having bariatric surgery.    He eventually decided against this.    At present, Mr. Allen has shortness of breath with moderate exertion.  He is   able to play golf on a regular basis and is not having any ongoing nighttime   respiratory symptoms.      CB/IN  dd: 10/26/2017 19:38:14 (CDT)  td: 10/27/2017 15:30:37 (CDT)  Doc ID   #[id number]  Job ID #[id number]    CC:       Review of Systems   Constitutional: Negative for fever and fatigue.   HENT: Positive for congestion. Negative for postnasal drip, sinus pressure and voice change.    Respiratory: Positive for shortness of breath and dyspnea on extertion. Negative for cough, sputum production and wheezing.    Cardiovascular: Negative for chest pain and leg swelling.   Genitourinary: Negative for difficulty urinating.   Musculoskeletal: Negative for arthralgias and back pain.   Skin: Negative for rash.   Gastrointestinal: Negative for abdominal pain and acid reflux.   Neurological: Negative for dizziness and weakness.   Hematological: Negative for adenopathy.       Objective:      Physical Exam   Constitutional: He is oriented to person, place, and time. He appears well-developed. He is obese.   HENT:   Head: Normocephalic.   Mouth/Throat: Oropharynx is clear and moist. No  oropharyngeal exudate.   Neck: Normal range of motion. Neck supple. No JVD present. No tracheal deviation present. No thyromegaly present.   Cardiovascular: Normal rate, regular rhythm and normal heart sounds.    No murmur heard.  Pulmonary/Chest: Hyperinflation. No stridor. He has decreased breath sounds. He has no wheezes. He has no rhonchi. He has no rales. He exhibits no tenderness.   Abdominal: Soft.   Musculoskeletal: He exhibits no edema.   Lymphadenopathy:     He has no cervical adenopathy.   Neurological: He is alert and oriented to person, place, and time.   Skin: Skin is warm and dry. No rash noted. No erythema. Nails show no clubbing.   Psychiatric: He has a normal mood and affect.   Vitals reviewed.    Personal Diagnostic Review    No flowsheet data found.      Assessment:       1. Centrilobular emphysema    2. Obesity, Class II, BMI 35-39.9, with comorbidity    3. Coronary artery disease involving native coronary artery of native heart without angina pectoris        Outpatient Encounter Prescriptions as of 10/26/2017   Medication Sig Dispense Refill    albuterol 90 mcg/actuation inhaler Inhale 2 puffs into the lungs every 4 (four) hours as needed for Wheezing. 1 each 12    albuterol-ipratropium 2.5mg-0.5mg/3mL (DUO-NEB) 0.5 mg-3 mg(2.5 mg base)/3 mL nebulizer solution INHALE 1 VIAL VIA NEBULIZER EVERY 6 HOURS AS NEEDED 180 mL 5    amitriptyline (ELAVIL) 25 MG tablet TAKE 1 TABLET BY MOUTH EVERY DAY 30 tablet 6    aspirin (ECOTRIN) 81 MG EC tablet Take 81 mg by mouth once.      atorvastatin (LIPITOR) 80 MG tablet TAKE 1 TABLET (80 MG TOTAL) BY MOUTH ONCE DAILY. 30 tablet 6    BETA-CAROTENE,A, W-C & E/MIN (OCUVITE ORAL) Take by mouth once daily.       DALIRESP 500 mcg Tab TAKE 1 TABLET BY MOUTH EVERY DAY 30 tablet 6    echinacea 400 mg Cap Take by mouth once daily.       fluticasone (FLONASE) 50 mcg/actuation nasal spray INSTILL 1 SPRAY IN EACH NOSTRIL ONCE DAILY. 16 g 6    hydrochlorothiazide  (HYDRODIURIL) 25 MG tablet Take 0.5 tablets (12.5 mg total) by mouth once daily. 30 tablet 6    latanoprost 0.005 % ophthalmic solution Place 1 drop into both eyes once daily.       losartan (COZAAR) 100 MG tablet TAKE 1 TABLET BY MOUTH ONCE A DAY 30 tablet 3    nitroGLYCERIN (NITROSTAT) 0.4 MG SL tablet Place 0.4 mg under the tongue every 5 (five) minutes as needed.      omeprazole (PRILOSEC) 20 MG capsule TAKE 1 CAPSULE BY MOUTH EVERY DAY 30 capsule 10    predniSONE (DELTASONE) 5 MG tablet TAKE 1 TABLET BY MOUTH EVERY OTHER DAY. TAKE WITH FOOD. 16 tablet 6    SPIRIVA WITH HANDIHALER 18 mcg inhalation capsule INHALE 1 CAPSULE WITH INHALATION DEVICE ONCE DAILY 30 capsule 6    SYMBICORT 160-4.5 mcg/actuation HFAA INHALE 2 PUFFS INTO THE LUNGS EVERY 12 HOURS. RINSE MOUTH AFTER USE 10.2 Inhaler 6     No facility-administered encounter medications on file as of 10/26/2017.      Orders Placed This Encounter   Procedures    Spirometry without Bronchodilator     Standing Status:   Future     Standing Expiration Date:   6/27/2018    DLCO-Carbon Monoxide Diffusing Capacity     Standing Status:   Future     Standing Expiration Date:   6/27/2018     Plan:     Continue present respiratory medications (roles reviewed at today's visit).  Discussed the early role for antibiotics for treatment of respiratory infection.  Return visit 6 months with Spirometry and DLCO.

## 2017-10-30 RX ORDER — FLUTICASONE PROPIONATE 50 MCG
SPRAY, SUSPENSION (ML) NASAL
Qty: 16 G | Refills: 3 | Status: SHIPPED | OUTPATIENT
Start: 2017-10-30 | End: 2018-08-17 | Stop reason: SDUPTHER

## 2017-11-13 RX ORDER — BUDESONIDE AND FORMOTEROL FUMARATE DIHYDRATE 160; 4.5 UG/1; UG/1
AEROSOL RESPIRATORY (INHALATION)
Qty: 10.2 INHALER | Refills: 6 | Status: SHIPPED | OUTPATIENT
Start: 2017-11-13 | End: 2018-06-11 | Stop reason: SDUPTHER

## 2017-12-05 RX ORDER — LOSARTAN POTASSIUM 100 MG/1
TABLET ORAL
Qty: 30 TABLET | Refills: 3 | Status: SHIPPED | OUTPATIENT
Start: 2017-12-05 | End: 2018-04-10 | Stop reason: SDUPTHER

## 2017-12-12 ENCOUNTER — TELEPHONE (OUTPATIENT)
Dept: AUDIOLOGY | Facility: CLINIC | Age: 72
End: 2017-12-12

## 2017-12-15 RX ORDER — OMEPRAZOLE 20 MG/1
CAPSULE, DELAYED RELEASE ORAL
Qty: 30 CAPSULE | Refills: 10 | Status: SHIPPED | OUTPATIENT
Start: 2017-12-15 | End: 2018-11-14 | Stop reason: SDUPTHER

## 2017-12-28 RX ORDER — ROFLUMILAST 500 UG/1
TABLET ORAL
Qty: 30 TABLET | Refills: 6 | Status: SHIPPED | OUTPATIENT
Start: 2017-12-28 | End: 2018-09-13 | Stop reason: SDUPTHER

## 2018-01-10 ENCOUNTER — TELEPHONE (OUTPATIENT)
Dept: INTERNAL MEDICINE | Facility: CLINIC | Age: 73
End: 2018-01-10

## 2018-01-10 NOTE — TELEPHONE ENCOUNTER
----- Message from Trina Kelly sent at 12/14/2017 10:16 AM CST -----  Contact: Swapna [phone]/ Reyna Barcenas has faxed a cmn to you several times with no response. Can you take care of this today/

## 2018-01-31 RX ORDER — TIOTROPIUM BROMIDE 18 UG/1
CAPSULE ORAL; RESPIRATORY (INHALATION)
Qty: 30 CAPSULE | Refills: 6 | Status: SHIPPED | OUTPATIENT
Start: 2018-01-31 | End: 2018-09-14 | Stop reason: SDUPTHER

## 2018-02-05 ENCOUNTER — OFFICE VISIT (OUTPATIENT)
Dept: OTOLARYNGOLOGY | Facility: CLINIC | Age: 73
End: 2018-02-05
Payer: MEDICARE

## 2018-02-05 ENCOUNTER — CLINICAL SUPPORT (OUTPATIENT)
Dept: AUDIOLOGY | Facility: CLINIC | Age: 73
End: 2018-02-05
Payer: MEDICARE

## 2018-02-05 VITALS — HEIGHT: 69 IN | WEIGHT: 243.19 LBS | BODY MASS INDEX: 36.02 KG/M2

## 2018-02-05 DIAGNOSIS — Z97.4 WEARS HEARING AID: ICD-10-CM

## 2018-02-05 DIAGNOSIS — H66.93 BILATERAL OTITIS MEDIA, UNSPECIFIED OTITIS MEDIA TYPE: ICD-10-CM

## 2018-02-05 DIAGNOSIS — H90.6 MIXED HEARING LOSS, BILATERAL: Primary | ICD-10-CM

## 2018-02-05 DIAGNOSIS — H90.6 MIXED HEARING LOSS, BILATERAL: ICD-10-CM

## 2018-02-05 DIAGNOSIS — H69.93 EUSTACHIAN TUBE DISORDER, BILATERAL: ICD-10-CM

## 2018-02-05 DIAGNOSIS — H65.93 MIDDLE EAR EFFUSION, BILATERAL: Primary | ICD-10-CM

## 2018-02-05 PROCEDURE — 1159F MED LIST DOCD IN RCRD: CPT | Mod: ,,, | Performed by: OTOLARYNGOLOGY

## 2018-02-05 PROCEDURE — 92567 TYMPANOMETRY: CPT | Mod: PBBFAC | Performed by: AUDIOLOGIST

## 2018-02-05 PROCEDURE — 99211 OFF/OP EST MAY X REQ PHY/QHP: CPT | Mod: PBBFAC,27

## 2018-02-05 PROCEDURE — 1126F AMNT PAIN NOTED NONE PRSNT: CPT | Mod: ,,, | Performed by: OTOLARYNGOLOGY

## 2018-02-05 PROCEDURE — 99213 OFFICE O/P EST LOW 20 MIN: CPT | Mod: PBBFAC,25 | Performed by: OTOLARYNGOLOGY

## 2018-02-05 PROCEDURE — 99213 OFFICE O/P EST LOW 20 MIN: CPT | Mod: S$PBB,,, | Performed by: OTOLARYNGOLOGY

## 2018-02-05 PROCEDURE — 92557 COMPREHENSIVE HEARING TEST: CPT | Mod: PBBFAC | Performed by: AUDIOLOGIST

## 2018-02-05 PROCEDURE — 99999 PR PBB SHADOW E&M-EST. PATIENT-LVL I: CPT | Mod: PBBFAC,,,

## 2018-02-05 PROCEDURE — 99999 PR PBB SHADOW E&M-EST. PATIENT-LVL III: CPT | Mod: PBBFAC,,, | Performed by: OTOLARYNGOLOGY

## 2018-02-05 RX ORDER — AMOXICILLIN 500 MG/1
500 CAPSULE ORAL 3 TIMES DAILY
Qty: 30 CAPSULE | Refills: 0 | Status: SHIPPED | OUTPATIENT
Start: 2018-02-05 | End: 2018-02-15

## 2018-02-05 NOTE — PROGRESS NOTES
Subjective:       Patient ID: Jordin Allen Jr. is a 72 y.o. male.    Chief Complaint: fluid in ears    HPI   Mr Allen is a 73yo M who was referred by Dr Castanon for evaluation of progressive hearing loss. Pt reports having constant fluid in his right ear, had previous PET placed many years ago. Now reports decreased hearing over the last 6 months. Was given hearing amplification with good results. States that now he feels fluid in his left ear. No fevers, no otorrhea, no otalgia, FN intact.     He currently denies any otologic stx including hearing loss, otalgia, otorrhea, tinnitus, vertigo, facial nn weakness.    No h/o head trauma, loud noise exposure. No FH hearing loss, vertigo, migraines.    Review of Systems    CONSTITUTIONAL: no fevers, chills, weight loss, night sweats  EYES: no diplopia, no blurry vision  ENT: as above   NEURO: no motor or sensory deficits  CV: no CP, no palpitations  PULM: no cough, no SOB, no wheezing   GI: no abd pain, no constipation/diarrhea  : no dysuria, no hematuria  MSK: no bone/joint pain  HEM: no bruising or bleeding   PSYCH - no depression, no anxiety      Objective:      Physical Exam   Constitutional: He is oriented to person, place, and time. He appears well-developed. No distress.   HENT:   Right Ear: External ear normal. No mastoid tenderness. Tympanic membrane is scarred. Tympanic membrane mobility is abnormal. A middle ear effusion is present. Decreased hearing is noted.   Left Ear: External ear normal. No mastoid tenderness. Tympanic membrane is scarred. Tympanic membrane mobility is abnormal. A middle ear effusion is present. Decreased hearing is noted.   Ears:    Eyes: EOM are normal. Pupils are equal, round, and reactive to light.   Neck: Normal range of motion. Neck supple. No JVD present. No thyromegaly present.   Cardiovascular: Regular rhythm.    Pulmonary/Chest: Effort normal.   Musculoskeletal: Normal range of motion.   Neurological: He is alert and  oriented to person, place, and time. No cranial nerve deficit.   Skin: Skin is warm and dry. Capillary refill takes less than 2 seconds.   Psychiatric: He has a normal mood and affect. His behavior is normal.               Assessment:       1. Middle ear effusion, bilateral    2. Mixed hearing loss, bilateral    3. Eustachian tube disorder, bilateral    4. Wears hearing aid        Plan:       - Amoxicillin x 10 days  - RTC 3 week for ear exam  - Consider PET if no resolution of effusion

## 2018-02-05 NOTE — PROGRESS NOTES
Jordin Allen was seen in the clinic today for an audiological evaluation.  Mr. Allen reported bilateral aural fullness and decreased hearing.  He currently wears binaural amplification.    Audiological testing revealed a moderate to severe mixed hearing loss, AU.  A speech reception threshold was obtained at 65 dBHL for the right ear and at 55 dBHL for the left ear.  Speech discrimination testing was 100% at 90 dBHL for the right ear and 92% at 85 dBHL for the left ear.      Tympanometry testing revealed flat Type B tympanograms, AU.      Recommendations:  1. Otologic evaluation  2. Annual hearing evaluation  3. Hearing protection when in noise   4. PRN follow-up for hearing aid concerns

## 2018-02-07 ENCOUNTER — OFFICE VISIT (OUTPATIENT)
Dept: INTERNAL MEDICINE | Facility: CLINIC | Age: 73
End: 2018-02-07
Payer: MEDICARE

## 2018-02-07 VITALS
TEMPERATURE: 98 F | RESPIRATION RATE: 18 BRPM | SYSTOLIC BLOOD PRESSURE: 138 MMHG | DIASTOLIC BLOOD PRESSURE: 80 MMHG | WEIGHT: 242.31 LBS | HEART RATE: 65 BPM | HEIGHT: 70 IN | BODY MASS INDEX: 34.69 KG/M2

## 2018-02-07 DIAGNOSIS — G57.11 MERALGIA PARESTHETICA OF RIGHT SIDE: ICD-10-CM

## 2018-02-07 DIAGNOSIS — Z12.11 COLON CANCER SCREENING: ICD-10-CM

## 2018-02-07 DIAGNOSIS — I10 HTN (HYPERTENSION), BENIGN: Primary | ICD-10-CM

## 2018-02-07 DIAGNOSIS — D12.2 ADENOMATOUS POLYP OF ASCENDING COLON: ICD-10-CM

## 2018-02-07 PROCEDURE — 1126F AMNT PAIN NOTED NONE PRSNT: CPT | Mod: ,,, | Performed by: INTERNAL MEDICINE

## 2018-02-07 PROCEDURE — 99214 OFFICE O/P EST MOD 30 MIN: CPT | Mod: ,,, | Performed by: INTERNAL MEDICINE

## 2018-02-07 PROCEDURE — 99213 OFFICE O/P EST LOW 20 MIN: CPT | Mod: PBBFAC,PO | Performed by: INTERNAL MEDICINE

## 2018-02-07 PROCEDURE — 1159F MED LIST DOCD IN RCRD: CPT | Mod: ,,, | Performed by: INTERNAL MEDICINE

## 2018-02-07 PROCEDURE — 99999 PR PBB SHADOW E&M-EST. PATIENT-LVL III: CPT | Mod: PBBFAC,,, | Performed by: INTERNAL MEDICINE

## 2018-02-07 NOTE — PROGRESS NOTES
This office note has been dictated.  HISTORY OF PRESENT ILLNESS:  This is a 72-year-old gentleman with hypertension,   COPD, CAD, obstructive sleep apnea, following up on those issues, six-month   followup since our health review in August 2017.  The patient reports in general   doing well.  Breathing is stable.  No chest pain, palpitations or syncope.    Does have a history of adenomatous colon polyp, due for five-year surveillance   colonoscopy.    CURRENT MEDICATIONS:  All medications noted and reviewed in the electronic   medical record medication list.    REVIEW OF SYSTEMS:  CONSTITUTIONAL:  No fever, no chills, no generalized body aches.  CARDIOVASCULAR:  No chest pain, no palpitations, no syncope.  GASTROINTESTINAL:  No nausea, vomiting, abdominal pain or diarrhea.  NEUROLOGIC:  For couple of months, he has had some feelings of hyperesthesia and   slight burning in the right lateral thigh.  No rashes or changes in appearance.    It seems positional and related more to when he is in bed.    PAST MEDICAL HISTORY, PAST SURGICAL HISTORY, FAMILY MEDICAL HISTORY AND SOCIAL   HISTORY:  All noted and reviewed in the electronic medical record history   sections.    PHYSICAL EXAMINATION:  GENERAL:  Pleasant, alert, appropriately groomed male in no acute distress.  VITAL SIGNS:  Blood pressure taken manually by this examiner is 138/80.  HEENT:  Normocephalic.  NECK:  Supple, no masses, no thyromegaly.  LUNGS:  Clear to auscultation.  CARDIOVASCULAR:  Regular rate and rhythm.  No significant murmur.  Carotids are   full bilaterally without bruits.  EXTREMITIES:  Right lower extremity, no rashes.  No gross motor deficits.    IMPRESSION:  1.  Hypertension, seems reasonably controlled based on the patient's home   readings and our reading in the office today.  2.  Meralgia paresthetica symptomatology, right thigh, mild.  3.  Adenomatous colon polyp, due for five-year surveillance.  4.  Other chronic medical conditions as  noted in the electronic medical record   history sections are stable.    PLAN:  1.  Discussed the meralgia paresthetica and potential options for treatment.  He   does not wish to pursue anything at this time.  Using topical lidocaine as   needed.  He will advise if symptoms worsen, change or he feels like he needs   further treatment.  2.  Continue current antihypertensive regimen.  3.  Colonoscopy is ordered.  4.  Return to clinic in August 2018 for general health assessment and followup.      PB/HN  dd: 02/07/2018 09:04:32 (CST)  td: 02/08/2018 07:45:46 (CST)  Doc ID   #[id number]  Job ID #[id number]    CC:

## 2018-02-12 RX ORDER — AMITRIPTYLINE HYDROCHLORIDE 25 MG/1
TABLET, FILM COATED ORAL
Qty: 30 TABLET | Refills: 6 | Status: SHIPPED | OUTPATIENT
Start: 2018-02-12 | End: 2018-09-12 | Stop reason: SDUPTHER

## 2018-02-20 ENCOUNTER — PATIENT MESSAGE (OUTPATIENT)
Dept: INTERNAL MEDICINE | Facility: CLINIC | Age: 73
End: 2018-02-20

## 2018-02-20 ENCOUNTER — OFFICE VISIT (OUTPATIENT)
Dept: CARDIOLOGY | Facility: CLINIC | Age: 73
End: 2018-02-20
Payer: MEDICARE

## 2018-02-20 VITALS
SYSTOLIC BLOOD PRESSURE: 146 MMHG | BODY MASS INDEX: 34.78 KG/M2 | DIASTOLIC BLOOD PRESSURE: 67 MMHG | WEIGHT: 242.94 LBS | HEIGHT: 70 IN | HEART RATE: 69 BPM

## 2018-02-20 DIAGNOSIS — I25.10 CORONARY ARTERY DISEASE INVOLVING NATIVE CORONARY ARTERY OF NATIVE HEART WITHOUT ANGINA PECTORIS: Primary | Chronic | ICD-10-CM

## 2018-02-20 DIAGNOSIS — Z87.891 HISTORY OF SMOKING 30 OR MORE PACK YEARS: ICD-10-CM

## 2018-02-20 DIAGNOSIS — J43.2 CENTRILOBULAR EMPHYSEMA: Chronic | ICD-10-CM

## 2018-02-20 DIAGNOSIS — I10 HTN (HYPERTENSION), BENIGN: ICD-10-CM

## 2018-02-20 DIAGNOSIS — Z12.11 COLON CANCER SCREENING: Primary | ICD-10-CM

## 2018-02-20 DIAGNOSIS — G47.33 OSA (OBSTRUCTIVE SLEEP APNEA): ICD-10-CM

## 2018-02-20 PROCEDURE — 99214 OFFICE O/P EST MOD 30 MIN: CPT | Mod: S$PBB,,, | Performed by: INTERNAL MEDICINE

## 2018-02-20 PROCEDURE — 1126F AMNT PAIN NOTED NONE PRSNT: CPT | Mod: ,,, | Performed by: INTERNAL MEDICINE

## 2018-02-20 PROCEDURE — 99999 PR PBB SHADOW E&M-EST. PATIENT-LVL III: CPT | Mod: PBBFAC,,, | Performed by: INTERNAL MEDICINE

## 2018-02-20 PROCEDURE — 1159F MED LIST DOCD IN RCRD: CPT | Mod: ,,, | Performed by: INTERNAL MEDICINE

## 2018-02-20 PROCEDURE — 99213 OFFICE O/P EST LOW 20 MIN: CPT | Mod: PBBFAC | Performed by: INTERNAL MEDICINE

## 2018-02-20 NOTE — PROGRESS NOTES
"Subjective:   Patient ID:  Jordin Allen Jr. is a 72 y.o. male who presents for evaluation of Hypertension      HPI: Routine yearly f/u.  Doing well with no complaints.  He did not end up going through with gastric bypass.  Weight is stable.  BMI registered today as slightly under 35 but that's because 5'10" was used as his height.  He's really more like 5'9".    He denies chest discomfort, MONTIEL, palpitations, PND/orthopnea, lightheadedness and syncope.    Still playing golf 4-5 times per week at [employer].  No issues or new limitations.    His BP was a little up today but he was worked up because of the parking situation today.  He brings with him, though, a list of BPs over the last two weeks and almost all of the readings are under 130 systolic, with several in the 100s-110s.      Feb 2017 HPI: Here for preoperative evaluation prior to gastric sleeve operation.  He continues with phase III rehab and continues to do very well.  He denies chest discomfort, MONTIEL, palpitations, PND/orthopnea, lightheadedness and syncope.        June 2016 HPI: Mr. Allen is a very pleasant man who has been seen by Dr. Pinedo at [place] for an MI on [facility] three years ago. He had an occluded proximal LAD that was treated successfully and he retained normal systolic function.     Prior to his initial visit with me 15 months ago, he had had a rash and was inadvertently told by a nurse to hold his ASA and Lipitor. He's back on that now and doing fine. He has had a problem with pruritic rashes in the past but that is not a problem currently and hasn't had a flare in about 3 months. These patches are now mainly on his anterior forearms.     He's going to phase III rehab now and doing well. He denies chest discomfort, MONTIEL, palpitations, PND/orthopnea, lightheadedness and syncope.     He takes prednisone 10mg every other day as directed by a Pulmonologist.    Past Medical History:   Diagnosis Date    Benign neoplasm of colon 10/1/2013    " Bronchitis chronic     CAD (coronary artery disease) 10/31/2013    COPD (chronic obstructive pulmonary disease)     Emphysema of lung     GERD (gastroesophageal reflux disease)     Hx of colonic polyps     Hypertension     NAFLD (nonalcoholic fatty liver disease) 3/27/2017    SHOLA on CPAP     RLS (restless legs syndrome)     Screen for colon cancer 8/30/2013       Past Surgical History:   Procedure Laterality Date    bilateral foot surgery  1993    CARDIAC CATHETERIZATION  2013    x1    CATARACT EXTRACTION, BILATERAL      COLON SURGERY  2013    Ace Mott MD       Social History   Substance Use Topics    Smoking status: Former Smoker     Packs/day: 1.00     Years: 40.00     Types: Cigarettes     Quit date: 8/15/2012    Smokeless tobacco: Never Used    Alcohol use Yes      Comment: social use only       Family History   Problem Relation Age of Onset    Heart disease Mother     Heart attack Mother     Hypertension Mother     Asthma Neg Hx     Emphysema Neg Hx     Prostate cancer Neg Hx     Cirrhosis Neg Hx        Current Outpatient Prescriptions   Medication Sig    albuterol 90 mcg/actuation inhaler Inhale 2 puffs into the lungs every 4 (four) hours as needed for Wheezing.    albuterol-ipratropium 2.5mg-0.5mg/3mL (DUO-NEB) 0.5 mg-3 mg(2.5 mg base)/3 mL nebulizer solution INHALE 1 VIAL VIA NEBULIZER EVERY 6 HOURS AS NEEDED    amitriptyline (ELAVIL) 25 MG tablet TAKE 1 TABLET BY MOUTH EVERY DAY    aspirin (ECOTRIN) 81 MG EC tablet Take 81 mg by mouth once.    atorvastatin (LIPITOR) 80 MG tablet TAKE 1 TABLET (80 MG TOTAL) BY MOUTH ONCE DAILY.    BETA-CAROTENE,A, W-C & E/MIN (OCUVITE ORAL) Take by mouth once daily.     DALIRESP 500 mcg Tab TAKE 1 TABLET BY MOUTH EVERY DAY    echinacea 400 mg Cap Take by mouth once daily.     fluticasone (FLONASE) 50 mcg/actuation nasal spray INSTILL 1 SPRAY IN EACH NOSTRIL ONCE DAILY.    hydrochlorothiazide (HYDRODIURIL) 25 MG tablet Take 0.5 tablets  (12.5 mg total) by mouth once daily.    latanoprost 0.005 % ophthalmic solution Place 1 drop into both eyes once daily.     losartan (COZAAR) 100 MG tablet TAKE 1 TABLET BY MOUTH ONCE A DAY    nitroGLYCERIN (NITROSTAT) 0.4 MG SL tablet Place 0.4 mg under the tongue every 5 (five) minutes as needed.    omeprazole (PRILOSEC) 20 MG capsule TAKE 1 CAPSULE BY MOUTH EVERY DAY    predniSONE (DELTASONE) 5 MG tablet TAKE 1 TABLET BY MOUTH EVERY OTHER DAY. TAKE WITH FOOD.    SPIRIVA WITH HANDIHALER 18 mcg inhalation capsule INHALE 1 CAPSULE WITH INHALATION DEVICE ONCE DAILY    SYMBICORT 160-4.5 mcg/actuation HFAA INHALE 2 PUFFS INTO THE LUNGS EVERY 12 HOURS. RINSE MOUTH AFTER USE     No current facility-administered medications for this visit.        Review of patient's allergies indicates:   Allergen Reactions    Brilinta [ticagrelor] Itching    Lisinopril      Other reaction(s): cough    Metoprolol succinate Rash       Review of Systems   Constitution: Negative.   HENT: Negative.    Eyes: Negative.    Cardiovascular: Negative.  Negative for chest pain, dyspnea on exertion, near-syncope, orthopnea and palpitations.   Respiratory: Negative.  Negative for cough, hemoptysis and shortness of breath.    Endocrine: Negative.    Hematologic/Lymphatic: Negative.    Skin: Negative.    Musculoskeletal: Negative.    Gastrointestinal: Negative.    Genitourinary: Negative.    Neurological: Negative.    Psychiatric/Behavioral: Negative.      Objective:   Physical Exam   Constitutional: He is oriented to person, place, and time. He appears well-developed and well-nourished.   HENT:   Head: Normocephalic and atraumatic.   Mouth/Throat: Oropharynx is clear and moist.   Eyes: Conjunctivae and EOM are normal. No scleral icterus.   Neck: Normal range of motion. Neck supple. No JVD present.   Cardiovascular: Normal rate, regular rhythm, normal heart sounds and intact distal pulses.  Exam reveals no gallop and no friction rub.    No  murmur heard.  Pulmonary/Chest: Effort normal and breath sounds normal. He has no wheezes. He has no rales.   Abdominal: Soft. Bowel sounds are normal. He exhibits no distension. There is no tenderness.   Musculoskeletal: Normal range of motion. He exhibits no edema.   Neurological: He is alert and oriented to person, place, and time.   Skin: Skin is warm and dry. No rash noted. No erythema.   Psychiatric: He has a normal mood and affect. His behavior is normal. Judgment and thought content normal.   Vitals reviewed.      Lab Results   Component Value Date    WBC 8.78 08/08/2017    HGB 13.8 (L) 08/08/2017    HCT 41.9 08/08/2017    MCV 83 08/08/2017     08/08/2017         Chemistry        Component Value Date/Time     08/08/2017 0748    K 4.2 08/08/2017 0748     08/08/2017 0748    CO2 28 08/08/2017 0748    BUN 26 (H) 08/08/2017 0748    CREATININE 1.0 08/08/2017 0748     08/08/2017 0748        Component Value Date/Time    CALCIUM 9.5 08/08/2017 0748    ALKPHOS 99 08/08/2017 0748    AST 18 08/08/2017 0748    ALT 26 08/08/2017 0748    BILITOT 1.1 (H) 08/08/2017 0748    ESTGFRAFRICA >60.0 08/08/2017 0748    EGFRNONAA >60.0 08/08/2017 0748            Lab Results   Component Value Date    CHOL 93 (L) 08/08/2017    CHOL 91 (L) 03/07/2017    CHOL 98 (L) 07/28/2016     Lab Results   Component Value Date    HDL 43 08/08/2017    HDL 24 (L) 03/07/2017    HDL 41 07/28/2016     Lab Results   Component Value Date    LDLCALC 35.4 (L) 08/08/2017    LDLCALC 44.8 (L) 03/07/2017    LDLCALC 45.8 (L) 07/28/2016     Lab Results   Component Value Date    TRIG 73 08/08/2017    TRIG 111 03/07/2017    TRIG 56 07/28/2016     Lab Results   Component Value Date    CHOLHDL 46.2 08/08/2017    CHOLHDL 26.4 03/07/2017    CHOLHDL 41.8 07/28/2016       Lab Results   Component Value Date    TSH 0.908 08/08/2017       Lab Results   Component Value Date    HGBA1C 5.8 05/30/2013         Assessment:     1. Coronary artery disease  involving native coronary artery of native heart without angina pectoris    2. HTN (hypertension), benign    3. History of smoking 30 or more pack years    4. Centrilobular emphysema    5. SHOLA (obstructive sleep apnea)    6. Obesity, Class II, BMI 35-39.9, with comorbidity        Plan:     If he does decide to proceed with bariatric surgery, he remains fine from a cardiovascular perspective to do so.    Continue current medicines.    Diet/exercise goals reinforced.    F/U 12 months

## 2018-02-21 ENCOUNTER — PATIENT MESSAGE (OUTPATIENT)
Dept: CARDIOLOGY | Facility: CLINIC | Age: 73
End: 2018-02-21

## 2018-02-22 ENCOUNTER — PATIENT MESSAGE (OUTPATIENT)
Dept: CARDIOLOGY | Facility: CLINIC | Age: 73
End: 2018-02-22

## 2018-02-22 DIAGNOSIS — R00.2 PALPITATION: Primary | ICD-10-CM

## 2018-02-23 RX ORDER — NITROGLYCERIN 0.4 MG/1
0.4 TABLET SUBLINGUAL EVERY 5 MIN PRN
Qty: 25 TABLET | Refills: 3 | Status: SHIPPED | OUTPATIENT
Start: 2018-02-23 | End: 2020-01-10 | Stop reason: SDUPTHER

## 2018-02-26 ENCOUNTER — OFFICE VISIT (OUTPATIENT)
Dept: OTOLARYNGOLOGY | Facility: CLINIC | Age: 73
End: 2018-02-26
Payer: MEDICARE

## 2018-02-26 VITALS — WEIGHT: 241.38 LBS | BODY MASS INDEX: 34.56 KG/M2 | HEIGHT: 70 IN

## 2018-02-26 DIAGNOSIS — H65.91 RIGHT OTITIS MEDIA WITH EFFUSION: Primary | ICD-10-CM

## 2018-02-26 PROCEDURE — 99999 PR PBB SHADOW E&M-EST. PATIENT-LVL III: CPT | Mod: PBBFAC,,, | Performed by: OTOLARYNGOLOGY

## 2018-02-26 PROCEDURE — 99213 OFFICE O/P EST LOW 20 MIN: CPT | Mod: PBBFAC | Performed by: OTOLARYNGOLOGY

## 2018-02-26 PROCEDURE — 1159F MED LIST DOCD IN RCRD: CPT | Mod: ,,, | Performed by: OTOLARYNGOLOGY

## 2018-02-26 PROCEDURE — 1126F AMNT PAIN NOTED NONE PRSNT: CPT | Mod: ,,, | Performed by: OTOLARYNGOLOGY

## 2018-02-26 PROCEDURE — 99213 OFFICE O/P EST LOW 20 MIN: CPT | Mod: S$PBB,,, | Performed by: OTOLARYNGOLOGY

## 2018-02-27 ENCOUNTER — TELEPHONE (OUTPATIENT)
Dept: ENDOSCOPY | Facility: HOSPITAL | Age: 73
End: 2018-02-27

## 2018-02-27 DIAGNOSIS — Z12.11 SPECIAL SCREENING FOR MALIGNANT NEOPLASMS, COLON: Primary | ICD-10-CM

## 2018-02-27 RX ORDER — SODIUM, POTASSIUM,MAG SULFATES 17.5-3.13G
1 SOLUTION, RECONSTITUTED, ORAL ORAL ONCE
Qty: 1 BOTTLE | Refills: 0 | Status: SHIPPED | OUTPATIENT
Start: 2018-02-27 | End: 2018-02-27

## 2018-03-21 ENCOUNTER — ANESTHESIA EVENT (OUTPATIENT)
Dept: ENDOSCOPY | Facility: HOSPITAL | Age: 73
End: 2018-03-21
Payer: MEDICARE

## 2018-03-21 ENCOUNTER — TELEPHONE (OUTPATIENT)
Dept: ENDOSCOPY | Facility: HOSPITAL | Age: 73
End: 2018-03-21

## 2018-03-21 ENCOUNTER — ANESTHESIA (OUTPATIENT)
Dept: ENDOSCOPY | Facility: HOSPITAL | Age: 73
End: 2018-03-21
Payer: MEDICARE

## 2018-03-21 ENCOUNTER — HOSPITAL ENCOUNTER (OUTPATIENT)
Facility: HOSPITAL | Age: 73
Discharge: HOME | End: 2018-03-21
Attending: COLON & RECTAL SURGERY | Admitting: COLON & RECTAL SURGERY
Payer: MEDICARE

## 2018-03-21 VITALS
RESPIRATION RATE: 18 BRPM | HEART RATE: 92 BPM | RESPIRATION RATE: 18 BRPM | HEIGHT: 70 IN | OXYGEN SATURATION: 99 % | OXYGEN SATURATION: 98 % | TEMPERATURE: 99 F | HEART RATE: 84 BPM | DIASTOLIC BLOOD PRESSURE: 62 MMHG | SYSTOLIC BLOOD PRESSURE: 107 MMHG | WEIGHT: 240 LBS | BODY MASS INDEX: 34.36 KG/M2 | SYSTOLIC BLOOD PRESSURE: 130 MMHG | DIASTOLIC BLOOD PRESSURE: 80 MMHG

## 2018-03-21 DIAGNOSIS — Z12.11 SCREENING FOR COLON CANCER: ICD-10-CM

## 2018-03-21 PROCEDURE — 27201012 HC FORCEPS, HOT/COLD, DISP: Performed by: COLON & RECTAL SURGERY

## 2018-03-21 PROCEDURE — 45380 COLONOSCOPY AND BIOPSY: CPT | Performed by: COLON & RECTAL SURGERY

## 2018-03-21 PROCEDURE — 25000003 PHARM REV CODE 250: Performed by: NURSE PRACTITIONER

## 2018-03-21 PROCEDURE — 45385 COLONOSCOPY W/LESION REMOVAL: CPT | Performed by: COLON & RECTAL SURGERY

## 2018-03-21 PROCEDURE — 45385 COLONOSCOPY W/LESION REMOVAL: CPT | Mod: PT,,, | Performed by: COLON & RECTAL SURGERY

## 2018-03-21 PROCEDURE — 88305 TISSUE EXAM BY PATHOLOGIST: CPT | Mod: 26,,, | Performed by: PATHOLOGY

## 2018-03-21 PROCEDURE — 63600175 PHARM REV CODE 636 W HCPCS: Performed by: NURSE ANESTHETIST, CERTIFIED REGISTERED

## 2018-03-21 PROCEDURE — 45380 COLONOSCOPY AND BIOPSY: CPT | Mod: 59,,, | Performed by: COLON & RECTAL SURGERY

## 2018-03-21 PROCEDURE — D9220A PRA ANESTHESIA: Mod: PT,CRNA,, | Performed by: NURSE ANESTHETIST, CERTIFIED REGISTERED

## 2018-03-21 PROCEDURE — 37000009 HC ANESTHESIA EA ADD 15 MINS: Performed by: COLON & RECTAL SURGERY

## 2018-03-21 PROCEDURE — 27201089 HC SNARE, DISP (ANY): Performed by: COLON & RECTAL SURGERY

## 2018-03-21 PROCEDURE — 88305 TISSUE EXAM BY PATHOLOGIST: CPT | Mod: 59 | Performed by: PATHOLOGY

## 2018-03-21 PROCEDURE — 37000008 HC ANESTHESIA 1ST 15 MINUTES: Performed by: COLON & RECTAL SURGERY

## 2018-03-21 PROCEDURE — D9220A PRA ANESTHESIA: Mod: PT,ANES,, | Performed by: ANESTHESIOLOGY

## 2018-03-21 RX ORDER — LIDOCAINE HCL/PF 100 MG/5ML
SYRINGE (ML) INTRAVENOUS
Status: DISCONTINUED | OUTPATIENT
Start: 2018-03-21 | End: 2018-03-21

## 2018-03-21 RX ORDER — SODIUM CHLORIDE 9 MG/ML
INJECTION, SOLUTION INTRAVENOUS CONTINUOUS
Status: CANCELLED | OUTPATIENT
Start: 2018-03-21

## 2018-03-21 RX ORDER — PROPOFOL 10 MG/ML
VIAL (ML) INTRAVENOUS
Status: DISCONTINUED | OUTPATIENT
Start: 2018-03-21 | End: 2018-03-21

## 2018-03-21 RX ORDER — PROPOFOL 10 MG/ML
VIAL (ML) INTRAVENOUS CONTINUOUS PRN
Status: DISCONTINUED | OUTPATIENT
Start: 2018-03-21 | End: 2018-03-21

## 2018-03-21 RX ORDER — SODIUM CHLORIDE 9 MG/ML
INJECTION, SOLUTION INTRAVENOUS CONTINUOUS
Status: DISCONTINUED | OUTPATIENT
Start: 2018-03-21 | End: 2018-03-21 | Stop reason: HOSPADM

## 2018-03-21 RX ADMIN — LIDOCAINE HYDROCHLORIDE 50 MG: 20 INJECTION, SOLUTION INTRAVENOUS at 09:03

## 2018-03-21 RX ADMIN — PROPOFOL 60 MG: 10 INJECTION, EMULSION INTRAVENOUS at 09:03

## 2018-03-21 RX ADMIN — PROPOFOL 125 MCG/KG/MIN: 10 INJECTION, EMULSION INTRAVENOUS at 09:03

## 2018-03-21 RX ADMIN — SODIUM CHLORIDE: 9 INJECTION, SOLUTION INTRAVENOUS at 08:03

## 2018-03-21 NOTE — PROVATION PATIENT INSTRUCTIONS
Discharge Summary/Instructions after an Endoscopic Procedure  Patient Name: Jordin Allen  Patient MRN: [id number]  Patient YOB: 1945 Wednesday, March 21, 2018  Terry Mott MD  RESTRICTIONS:  During your procedure today, you received medications for sedation.  These   medications may affect your judgment, balance and coordination.  Therefore,   for 24 hours, you have the following restrictions:   - DO NOT drive a car, operate machinery, make legal/financial decisions,   sign important papers or drink alcohol.    ACTIVITY:  The following day: return to full activity including work, except no heavy   lifting, straining or running for 3 days if polyps were removed.  DIET:  Eat and drink normally unless instructed otherwise.     TREATMENT FOR COMMON SIDE EFFECTS:  - Mild abdominal pain, nausea, belching, bloating or excessive gas:  rest,   eat lightly and use a heating pad.  - Sore Throat: treat with throat lozenges and/or gargle with warm salt   water.  - Because air was used during the procedure, expelling large amounts of air   from your rectum or belching is normal.  - If a bowel prep was taken, you may not have a bowel movement for 1-3 days.    This is normal.  SYMPTOMS TO WATCH FOR AND REPORT TO YOUR PHYSICIAN:  1. Abdominal pain or bloating, other than gas cramps.  2. Chest pain.  3. Back pain.  4. Signs of infection such as: chills or fever occurring within 24 hours   after the procedure.  5. Rectal bleeding, which would show as bright red, maroon, or black stools.   (A tablespoon of blood from the rectum is not serious, especially if   hemorrhoids are present.)  6. Vomiting.  7. Weakness or dizziness.  GO DIRECTLY TO THE NEAREST EMERGENCY ROOM IF YOU HAVE ANY OF THE FOLLOWING:      Difficulty breathing              Chills and/or fever over 101 F   Persistent vomiting and/or vomiting blood   Severe abdominal pain   Severe chest pain   Black, tarry stools   Bleeding- more than one tablespoon   Any  other symptom or condition that you feel may need urgent attention  Your doctor recommends these additional instructions:  If any biopsies were taken, your doctors clinic will contact you in 1 to 2   weeks with any results.  Your physician has recommended a repeat colonoscopy in one year for   surveillance.  For questions, problems or results please call your physician - Terry Mott MD at Work:  [phone].  [facility], EMERGENCY ROOM PHONE NUMBER: [phone]  IF A COMPLICATION OR EMERGENCY SITUATION ARISES AND YOU ARE UNABLE TO REACH   YOUR PHYSICIAN - GO DIRECTLY TO THE EMERGENCY ROOM.  Terry Mott MD  3/21/2018 9:47:09 AM  This report has been verified and signed electronically.

## 2018-03-21 NOTE — H&P
Endoscopy H&P    Procedure : Colonoscopy      asymptomatic screening exam      Past Medical History:   Diagnosis Date    Benign neoplasm of colon 10/1/2013    Bronchitis chronic     CAD (coronary artery disease) 10/31/2013    COPD (chronic obstructive pulmonary disease)     Emphysema of lung     GERD (gastroesophageal reflux disease)     Hx of colonic polyps     Hypertension     NAFLD (nonalcoholic fatty liver disease) 3/27/2017    SHOLA on CPAP     RLS (restless legs syndrome)     Screen for colon cancer 8/30/2013       Family History   Problem Relation Age of Onset    Heart disease Mother     Heart attack Mother     Hypertension Mother     Asthma Neg Hx     Emphysema Neg Hx     Prostate cancer Neg Hx     Cirrhosis Neg Hx        Social History     Social History    Marital status:      Spouse name: N/A    Number of children: N/A    Years of education: N/A     Occupational History    Not on file.     Social History Main Topics    Smoking status: Former Smoker     Packs/day: 1.00     Years: 40.00     Types: Cigarettes     Quit date: 8/15/2012    Smokeless tobacco: Never Used    Alcohol use Yes      Comment: social use only    Drug use: No    Sexual activity: Yes     Partners: Female     Other Topics Concern    Not on file     Social History Narrative    Retired .  .         Review of Systems:  Respiratory ROS: no cough, shortness of breath, or wheezing  Cardiovascular ROS: no chest pain or dyspnea on exertion  Gastrointestinal ROS: no abdominal pain, change in bowel habits, or black or bloody stools  Musculoskeletal ROS: negative  Neurological ROS: no TIA or stroke symptoms        Physical Exam:  General: no distress  Head: normocephalic  Neck: supple, symmetrical, trachea midline  Lungs:  clear to auscultation bilaterally and normal respiratory effort  Heart: regular rate and rhythm, S1, S2  normal, no murmur, rub or gallop  Abdomen: soft, non-tender non-distented; bowel sounds normal; no masses,  no organomegaly  Extremities: no cyanosis or edema, or clubbing       Deep Sedation: Mallampati Score II (hard and soft palate, upper portion of tonsils anduvula visible)    II    Assessment and Plan:  Proceed with Colonoscopy

## 2018-03-21 NOTE — ANESTHESIA PREPROCEDURE EVALUATION
03/21/2018  Jordin Allen Jr. is a 72 y.o., male.    Pre-op Assessment    I have reviewed the Patient Summary Reports.     I have reviewed the Nursing Notes.   I have reviewed the Medications.     Review of Systems  Anesthesia Hx:  No problems with previous Anesthesia   Denies Personal Hx of Anesthesia complications.   Social:  Former Smoker    Cardiovascular:   Hypertension CAD asymptomatic CABG/stent     Pulmonary:   COPD Sleep Apnea    Renal/:  Renal/ Normal     Hepatic/GI:   Bowel Prep. GERD    Endocrine:  Endocrine Normal        Physical Exam  General:  Obesity    Airway/Jaw/Neck:  Airway Findings: Mouth Opening: Normal Tongue: Normal  General Airway Assessment: Adult  Mallampati: II  Improves to II with phonation.  TM Distance: Normal, at least 6 cm  Jaw/Neck Findings:      Dental:  Dental Findings: In tact   Chest/Lungs:  Chest/Lungs Findings: Normal Respiratory Rate     Heart/Vascular:  Heart Findings: Rate: Normal             Anesthesia Plan  Type of Anesthesia, risks & benefits discussed:  Anesthesia Type:  general  Patient's Preference: General  Intra-op Monitoring Plan: standard ASA monitors  Intra-op Monitoring Plan Comments:   Post Op Pain Control Plan:   Post Op Pain Control Plan Comments:   Induction:   IV  Beta Blocker:  Patient is not currently on a Beta-Blocker (No further documentation required).       Informed Consent: Patient understands risks and agrees with Anesthesia plan.  Questions answered. Anesthesia consent signed with patient.  ASA Score: 3     Day of Surgery Review of History & Physical:    H&P update referred to the surgeon.         Ready For Surgery From Anesthesia Perspective.

## 2018-03-21 NOTE — TRANSFER OF CARE
"Anesthesia Transfer of Care Note    Patient: Jordin Allen Jr.    Procedure(s) Performed: Procedure(s) (LRB):  COLONOSCOPY (N/A)    Patient location: PACU    Anesthesia Type: general    Transport from OR: Transported from OR on room air with adequate spontaneous ventilation    Post pain: adequate analgesia    Post assessment: no apparent anesthetic complications and tolerated procedure well    Post vital signs: stable    Level of consciousness: awake, alert and oriented    Nausea/Vomiting: no nausea/vomiting    Complications: none    Transfer of care protocol was followed      Last vitals:   Visit Vitals  /57   Pulse 95   Temp 36.9 °C (98.4 °F) (Temporal)   Resp 16   Ht 5' 10" (1.778 m)   Wt 108.9 kg (240 lb)   SpO2 98%   BMI 34.44 kg/m²     "

## 2018-03-21 NOTE — ANESTHESIA RELEASE NOTE
"Anesthesia Release from PACU Note    Patient: Jordin Allen JrGreg    Procedure(s) Performed: Procedure(s) (LRB):  COLONOSCOPY (N/A)    Anesthesia type: general    Post pain: Adequate analgesia    Post assessment: no apparent anesthetic complications and tolerated procedure well    Last Vitals:   Visit Vitals  /62 (BP Location: Left arm, Patient Position: Lying)   Pulse 92   Temp 37.3 °C (99.1 °F) (Temporal)   Resp 18   Ht 5' 10" (1.778 m)   Wt 108.9 kg (240 lb)   SpO2 99%   BMI 34.44 kg/m²       Post vital signs: stable    Level of consciousness: awake and alert     Nausea/Vomiting: no nausea/no vomiting    Complications: none    Airway Patency: patent    Respiratory: unassisted, spontaneous ventilation, room air    Cardiovascular: stable and blood pressure at baseline    Hydration: euvolemic  "

## 2018-03-21 NOTE — ANESTHESIA POSTPROCEDURE EVALUATION
"Anesthesia Post Evaluation    Patient: Jordin Allen JrGreg    Procedure(s) Performed: Procedure(s) (LRB):  COLONOSCOPY (N/A)    Final Anesthesia Type: general  Patient location during evaluation: GI PACU  Patient participation: Yes- Able to Participate  Level of consciousness: awake and alert  Post-procedure vital signs: reviewed and stable  Pain management: adequate  Airway patency: patent  PONV status at discharge: No PONV  Anesthetic complications: no      Cardiovascular status: blood pressure returned to baseline and stable  Respiratory status: unassisted, spontaneous ventilation and room air  Hydration status: euvolemic  Follow-up not needed.        Visit Vitals  /62 (BP Location: Left arm, Patient Position: Lying)   Pulse 92   Temp 37.3 °C (99.1 °F) (Temporal)   Resp 18   Ht 5' 10" (1.778 m)   Wt 108.9 kg (240 lb)   SpO2 99%   BMI 34.44 kg/m²       Pain/Shelley Score: Pain Assessment Performed: Yes (3/21/2018 10:11 AM)  Presence of Pain: denies (3/21/2018 10:11 AM)  Shelley Score: 10 (3/21/2018 10:11 AM)      "

## 2018-03-28 ENCOUNTER — TELEPHONE (OUTPATIENT)
Dept: ENDOSCOPY | Facility: HOSPITAL | Age: 73
End: 2018-03-28

## 2018-04-10 RX ORDER — LOSARTAN POTASSIUM 100 MG/1
TABLET ORAL
Qty: 30 TABLET | Refills: 3 | Status: SHIPPED | OUTPATIENT
Start: 2018-04-10 | End: 2018-09-21 | Stop reason: SDUPTHER

## 2018-04-18 ENCOUNTER — PATIENT MESSAGE (OUTPATIENT)
Dept: CARDIOLOGY | Facility: CLINIC | Age: 73
End: 2018-04-18

## 2018-04-19 RX ORDER — ATORVASTATIN CALCIUM 80 MG/1
TABLET, FILM COATED ORAL
Qty: 30 TABLET | Refills: 6 | Status: SHIPPED | OUTPATIENT
Start: 2018-04-19 | End: 2018-11-02 | Stop reason: SDUPTHER

## 2018-04-24 ENCOUNTER — HOSPITAL ENCOUNTER (OUTPATIENT)
Dept: PULMONOLOGY | Facility: CLINIC | Age: 73
Discharge: HOME | End: 2018-04-24
Payer: MEDICARE

## 2018-04-24 ENCOUNTER — OFFICE VISIT (OUTPATIENT)
Dept: PULMONOLOGY | Facility: CLINIC | Age: 73
End: 2018-04-24
Payer: MEDICARE

## 2018-04-24 VITALS
BODY MASS INDEX: 36.42 KG/M2 | WEIGHT: 240.31 LBS | OXYGEN SATURATION: 97 % | DIASTOLIC BLOOD PRESSURE: 84 MMHG | HEART RATE: 97 BPM | HEIGHT: 68 IN | RESPIRATION RATE: 14 BRPM | SYSTOLIC BLOOD PRESSURE: 138 MMHG

## 2018-04-24 DIAGNOSIS — J43.2 CENTRILOBULAR EMPHYSEMA: Chronic | ICD-10-CM

## 2018-04-24 DIAGNOSIS — I25.10 CORONARY ARTERY DISEASE INVOLVING NATIVE CORONARY ARTERY OF NATIVE HEART WITHOUT ANGINA PECTORIS: Primary | Chronic | ICD-10-CM

## 2018-04-24 LAB
PRE FEV1 FVC: 47
PRE FEV1: 1.33
PRE FVC: 2.81
PREDICTED FEV1 FVC: 79
PREDICTED FEV1: 2.94
PREDICTED FVC: 3.7

## 2018-04-24 PROCEDURE — 94010 BREATHING CAPACITY TEST: CPT | Mod: PBBFAC | Performed by: INTERNAL MEDICINE

## 2018-04-24 PROCEDURE — 94729 DIFFUSING CAPACITY: CPT | Mod: PBBFAC | Performed by: INTERNAL MEDICINE

## 2018-04-24 PROCEDURE — 94010 BREATHING CAPACITY TEST: CPT | Mod: 26,S$PBB,, | Performed by: INTERNAL MEDICINE

## 2018-04-24 PROCEDURE — 94729 DIFFUSING CAPACITY: CPT | Mod: 26,S$PBB,, | Performed by: INTERNAL MEDICINE

## 2018-04-24 PROCEDURE — 99999 PR PBB SHADOW E&M-EST. PATIENT-LVL III: CPT | Mod: PBBFAC,,, | Performed by: INTERNAL MEDICINE

## 2018-04-24 PROCEDURE — 99213 OFFICE O/P EST LOW 20 MIN: CPT | Mod: PBBFAC,25 | Performed by: INTERNAL MEDICINE

## 2018-04-24 PROCEDURE — 99214 OFFICE O/P EST MOD 30 MIN: CPT | Mod: 25,S$PBB,, | Performed by: INTERNAL MEDICINE

## 2018-04-25 DIAGNOSIS — J43.2 CENTRILOBULAR EMPHYSEMA: Chronic | ICD-10-CM

## 2018-04-25 NOTE — PROGRESS NOTES
Subjective:       Patient ID: Jordin Allen Jr. is a 72 y.o. male.    Chief Complaint: COPD    HPI HISTORY OF PRESENT ILLNESS:  Mr. Allen is a 72-year-old former smoker who comes   for an interval assessment of advanced chronic obstructive lung disease.  He   feels his respiratory status has been stable during the past several months.  He   has not had any symptoms to suggest respiratory infection.  He remains on a   regimen of bronchodilator medications that include Symbicort 160, Spiriva,   Daliresp, DuoNeb aerosol treatments or albuterol HFA inhaler, and prednisone 5   mg every other day.  He is not having any active sinus or upper GI symptoms.  He   does take Prilosec for control of gastroesophageal reflux.    Mr. Allen has had a stable weight during the past several months.  He is   followed in the Cardiology Clinic for management of coronary artery disease.    DATA:  I reviewed the results of pulmonary function studies done earlier today.    The forced vital capacity is 2.81 L, which is 75% of predicted.  The FEV1 is   1.33 L, or 45% of predicted.  The ratio of these values is 47%.  The diffusion   capacity is 18.4, which is 75% of the expected value.    When today's study results were compared to previous studies dating back over   the past 5 years, the spirometry values have been somewhat variable, but always   show moderate-to-severe obstruction.  The diffusion capacity has been stable and   shows only a mild reduction from the normal range.      CB/HN  dd: 04/24/2018 20:09:42 (CDT)  td: 04/25/2018 11:31:08 (CDT)  Doc ID   #[id number]  Job ID #[id number]    CC:       Review of Systems   Constitutional: Negative for fever and fatigue.   HENT: Negative for postnasal drip, sinus pressure, voice change and congestion.    Respiratory: Positive for dyspnea on extertion. Negative for cough, sputum production, shortness of breath and wheezing.    Cardiovascular: Negative for chest pain and leg swelling.    Genitourinary: Negative for difficulty urinating.   Musculoskeletal: Negative for arthralgias and back pain.   Skin: Negative for rash.   Gastrointestinal: Negative for abdominal pain and acid reflux.   Neurological: Negative for dizziness and weakness.   Hematological: Negative for adenopathy.       Objective:      Physical Exam   Constitutional: He is oriented to person, place, and time. He appears well-developed. He is obese.   HENT:   Head: Normocephalic.   Mouth/Throat: Oropharynx is clear and moist. No oropharyngeal exudate.   Neck: Normal range of motion. Neck supple. No JVD present. No tracheal deviation present. No thyromegaly present.   Cardiovascular: Normal rate, regular rhythm and normal heart sounds.    No murmur heard.  Pulmonary/Chest: Hyperinflation. No stridor. He has decreased breath sounds. He has no wheezes. He has no rhonchi. He has no rales. He exhibits no tenderness.   Abdominal: Soft.   Musculoskeletal: He exhibits no edema.   Lymphadenopathy:     He has no cervical adenopathy.   Neurological: He is alert and oriented to person, place, and time.   Skin: Skin is warm and dry. No rash noted. No erythema. Nails show no clubbing.   Psychiatric: He has a normal mood and affect.   Vitals reviewed.    Personal Diagnostic Review    No flowsheet data found.      Assessment:       1. Coronary artery disease involving native coronary artery of native heart without angina pectoris    2. Centrilobular emphysema    3. Obesity, Class II, BMI 35-39.9, with comorbidity        Outpatient Encounter Prescriptions as of 4/24/2018   Medication Sig Dispense Refill    albuterol 90 mcg/actuation inhaler Inhale 2 puffs into the lungs every 4 (four) hours as needed for Wheezing. 1 each 12    albuterol-ipratropium 2.5mg-0.5mg/3mL (DUO-NEB) 0.5 mg-3 mg(2.5 mg base)/3 mL nebulizer solution INHALE 1 VIAL VIA NEBULIZER EVERY 6 HOURS AS NEEDED 180 mL 5    amitriptyline (ELAVIL) 25 MG tablet TAKE 1 TABLET BY MOUTH EVERY  DAY 30 tablet 6    aspirin (ECOTRIN) 81 MG EC tablet Take 81 mg by mouth once.      atorvastatin (LIPITOR) 80 MG tablet TAKE 1 TABLET (80 MG TOTAL) BY MOUTH ONCE DAILY. 30 tablet 6    BETA-CAROTENE,A, W-C & E/MIN (OCUVITE ORAL) Take by mouth once daily.       DALIRESP 500 mcg Tab TAKE 1 TABLET BY MOUTH EVERY DAY 30 tablet 6    echinacea 400 mg Cap Take by mouth once daily.       fluticasone (FLONASE) 50 mcg/actuation nasal spray INSTILL 1 SPRAY IN EACH NOSTRIL ONCE DAILY. 16 g 3    hydrochlorothiazide (HYDRODIURIL) 25 MG tablet Take 0.5 tablets (12.5 mg total) by mouth once daily. 30 tablet 6    latanoprost 0.005 % ophthalmic solution Place 1 drop into both eyes once daily.       losartan (COZAAR) 100 MG tablet TAKE 1 TABLET BY MOUTH ONCE A DAY 30 tablet 3    nitroGLYCERIN (NITROSTAT) 0.4 MG SL tablet Place 1 tablet (0.4 mg total) under the tongue every 5 (five) minutes as needed. 25 tablet 3    omeprazole (PRILOSEC) 20 MG capsule TAKE 1 CAPSULE BY MOUTH EVERY DAY 30 capsule 10    predniSONE (DELTASONE) 5 MG tablet TAKE 1 TABLET BY MOUTH EVERY OTHER DAY. TAKE WITH FOOD. 16 tablet 6    SPIRIVA WITH HANDIHALER 18 mcg inhalation capsule INHALE 1 CAPSULE WITH INHALATION DEVICE ONCE DAILY 30 capsule 6    SYMBICORT 160-4.5 mcg/actuation HFAA INHALE 2 PUFFS INTO THE LUNGS EVERY 12 HOURS. RINSE MOUTH AFTER USE 10.2 Inhaler 6     No facility-administered encounter medications on file as of 4/24/2018.      No orders of the defined types were placed in this encounter.    Plan:     Continue present respiratory medications (roles reviewed at today's visit).  Discussed the early role for antibiotics for treatment of respiratory infection.  Return visit 6 months.

## 2018-04-25 NOTE — PATIENT INSTRUCTIONS
Continue present respiratory medications (roles reviewed at today's visit).  Discussed the early role for antibiotics for treatment of respiratory infection.  Return visit 6 months.

## 2018-04-26 RX ORDER — PREDNISONE 5 MG/1
TABLET ORAL
Qty: 16 TABLET | Refills: 6 | Status: SHIPPED | OUTPATIENT
Start: 2018-04-26 | End: 2018-12-04 | Stop reason: SDUPTHER

## 2018-06-11 RX ORDER — BUDESONIDE AND FORMOTEROL FUMARATE DIHYDRATE 160; 4.5 UG/1; UG/1
AEROSOL RESPIRATORY (INHALATION)
Qty: 10.2 INHALER | Refills: 6 | Status: SHIPPED | OUTPATIENT
Start: 2018-06-11 | End: 2018-12-12 | Stop reason: SDUPTHER

## 2018-06-12 ENCOUNTER — TELEPHONE (OUTPATIENT)
Dept: INTERNAL MEDICINE | Facility: CLINIC | Age: 73
End: 2018-06-12

## 2018-06-12 DIAGNOSIS — I25.10 CORONARY ARTERY DISEASE, ANGINA PRESENCE UNSPECIFIED, UNSPECIFIED VESSEL OR LESION TYPE, UNSPECIFIED WHETHER NATIVE OR TRANSPLANTED HEART: ICD-10-CM

## 2018-06-12 DIAGNOSIS — I10 HYPERTENSION, UNSPECIFIED TYPE: Primary | ICD-10-CM

## 2018-06-12 DIAGNOSIS — K76.0 NAFLD (NONALCOHOLIC FATTY LIVER DISEASE): ICD-10-CM

## 2018-06-12 DIAGNOSIS — Z12.5 ENCOUNTER FOR SCREENING FOR MALIGNANT NEOPLASM OF PROSTATE: ICD-10-CM

## 2018-06-12 NOTE — TELEPHONE ENCOUNTER
----- Message from Gisselle Belle sent at 6/12/2018 10:57 AM CDT -----  Doctor appointment and lab have been scheduled.  Please link lab orders to the lab appointment.  Date of doctor appointment:  10/17  Physical or EP:  Physical  Date of lab appointment:  10/10  Comments:

## 2018-08-17 RX ORDER — FLUTICASONE PROPIONATE 50 MCG
SPRAY, SUSPENSION (ML) NASAL
Qty: 16 ML | Refills: 3 | Status: SHIPPED | OUTPATIENT
Start: 2018-08-17 | End: 2019-10-11 | Stop reason: SDUPTHER

## 2018-08-28 RX ORDER — HYDROCHLOROTHIAZIDE 25 MG/1
12.5 TABLET ORAL DAILY
Qty: 30 TABLET | Refills: 6 | Status: SHIPPED | OUTPATIENT
Start: 2018-08-28 | End: 2019-09-24 | Stop reason: SDUPTHER

## 2018-09-12 RX ORDER — AMITRIPTYLINE HYDROCHLORIDE 25 MG/1
TABLET, FILM COATED ORAL
Qty: 30 TABLET | Refills: 6 | Status: SHIPPED | OUTPATIENT
Start: 2018-09-12 | End: 2019-08-07 | Stop reason: SDUPTHER

## 2018-09-14 RX ORDER — ROFLUMILAST 500 UG/1
TABLET ORAL
Qty: 30 TABLET | Refills: 6 | Status: SHIPPED | OUTPATIENT
Start: 2018-09-14 | End: 2019-04-17 | Stop reason: SDUPTHER

## 2018-09-14 RX ORDER — TIOTROPIUM BROMIDE 18 UG/1
CAPSULE ORAL; RESPIRATORY (INHALATION)
Qty: 30 CAPSULE | Refills: 6 | Status: SHIPPED | OUTPATIENT
Start: 2018-09-14 | End: 2019-09-08 | Stop reason: SDUPTHER

## 2018-09-17 RX ORDER — AMITRIPTYLINE HYDROCHLORIDE 25 MG/1
TABLET, FILM COATED ORAL
Qty: 30 TABLET | Refills: 6 | Status: SHIPPED | OUTPATIENT
Start: 2018-09-17 | End: 2018-10-17 | Stop reason: SDUPTHER

## 2018-09-21 RX ORDER — LOSARTAN POTASSIUM 100 MG/1
TABLET ORAL
Qty: 30 TABLET | Refills: 3 | Status: SHIPPED | OUTPATIENT
Start: 2018-09-21 | End: 2019-01-09 | Stop reason: SDUPTHER

## 2018-09-27 RX ORDER — TIOTROPIUM BROMIDE 18 UG/1
CAPSULE ORAL; RESPIRATORY (INHALATION)
Qty: 30 CAPSULE | Refills: 6 | Status: SHIPPED | OUTPATIENT
Start: 2018-09-27 | End: 2018-12-05 | Stop reason: SDUPTHER

## 2018-10-10 ENCOUNTER — LAB VISIT (OUTPATIENT)
Dept: LAB | Facility: HOSPITAL | Age: 73
End: 2018-10-10
Attending: INTERNAL MEDICINE
Payer: MEDICARE

## 2018-10-10 ENCOUNTER — TELEPHONE (OUTPATIENT)
Dept: INTERNAL MEDICINE | Facility: CLINIC | Age: 73
End: 2018-10-10

## 2018-10-10 DIAGNOSIS — I25.10 CORONARY ARTERY DISEASE, ANGINA PRESENCE UNSPECIFIED, UNSPECIFIED VESSEL OR LESION TYPE, UNSPECIFIED WHETHER NATIVE OR TRANSPLANTED HEART: ICD-10-CM

## 2018-10-10 DIAGNOSIS — Z12.5 ENCOUNTER FOR SCREENING FOR MALIGNANT NEOPLASM OF PROSTATE: ICD-10-CM

## 2018-10-10 DIAGNOSIS — K76.0 NAFLD (NONALCOHOLIC FATTY LIVER DISEASE): ICD-10-CM

## 2018-10-10 DIAGNOSIS — I10 HYPERTENSION, UNSPECIFIED TYPE: ICD-10-CM

## 2018-10-10 LAB
BASOPHILS # BLD AUTO: 0.06 K/UL
BASOPHILS NFR BLD: 0.7 %
COMPLEXED PSA SERPL-MCNC: 1.8 NG/ML
DIFFERENTIAL METHOD: ABNORMAL
EOSINOPHIL # BLD AUTO: 0.3 K/UL
EOSINOPHIL NFR BLD: 2.9 %
ERYTHROCYTE [DISTWIDTH] IN BLOOD BY AUTOMATED COUNT: 14.8 %
HCT VFR BLD AUTO: 43.9 %
HGB BLD-MCNC: 13.2 G/DL
IMM GRANULOCYTES # BLD AUTO: 0.03 K/UL
IMM GRANULOCYTES NFR BLD AUTO: 0.3 %
LYMPHOCYTES # BLD AUTO: 2.4 K/UL
LYMPHOCYTES NFR BLD: 26.4 %
MCH RBC QN AUTO: 26.2 PG
MCHC RBC AUTO-ENTMCNC: 30.1 G/DL
MCV RBC AUTO: 87 FL
MONOCYTES # BLD AUTO: 0.8 K/UL
MONOCYTES NFR BLD: 8.3 %
NEUTROPHILS # BLD AUTO: 5.6 K/UL
NEUTROPHILS NFR BLD: 61.4 %
NRBC BLD-RTO: 0 /100 WBC
PLATELET # BLD AUTO: 180 K/UL
PMV BLD AUTO: 12.1 FL
RBC # BLD AUTO: 5.04 M/UL
TSH SERPL DL<=0.005 MIU/L-ACNC: 0.7 UIU/ML
WBC # BLD AUTO: 9.16 K/UL

## 2018-10-10 PROCEDURE — 80061 LIPID PANEL: CPT

## 2018-10-10 PROCEDURE — 80053 COMPREHEN METABOLIC PANEL: CPT

## 2018-10-10 PROCEDURE — 85025 COMPLETE CBC W/AUTO DIFF WBC: CPT

## 2018-10-10 PROCEDURE — 36415 COLL VENOUS BLD VENIPUNCTURE: CPT | Mod: PO

## 2018-10-10 PROCEDURE — 84443 ASSAY THYROID STIM HORMONE: CPT

## 2018-10-10 PROCEDURE — 84153 ASSAY OF PSA TOTAL: CPT

## 2018-10-10 NOTE — TELEPHONE ENCOUNTER
----- Message from Jaymie Golden sent at 10/1/2018  3:46 PM CDT -----  Contact: Anita ([facility]) [phone]  Anita is following up on the forms that was faxed over to Dr. Mccain for the patients C-pap water chamber.

## 2018-10-11 LAB
ALBUMIN SERPL BCP-MCNC: 3.7 G/DL
ALP SERPL-CCNC: 91 U/L
ALT SERPL W/O P-5'-P-CCNC: 29 U/L
ANION GAP SERPL CALC-SCNC: 12 MMOL/L
AST SERPL-CCNC: 21 U/L
BILIRUB SERPL-MCNC: 0.8 MG/DL
BUN SERPL-MCNC: 20 MG/DL
CALCIUM SERPL-MCNC: 10.1 MG/DL
CHLORIDE SERPL-SCNC: 106 MMOL/L
CHOLEST SERPL-MCNC: 97 MG/DL
CHOLEST/HDLC SERPL: 2.2 {RATIO}
CO2 SERPL-SCNC: 25 MMOL/L
CREAT SERPL-MCNC: 1 MG/DL
EST. GFR  (AFRICAN AMERICAN): >60 ML/MIN/1.73 M^2
EST. GFR  (NON AFRICAN AMERICAN): >60 ML/MIN/1.73 M^2
GLUCOSE SERPL-MCNC: 92 MG/DL
HDLC SERPL-MCNC: 44 MG/DL
HDLC SERPL: 45.4 %
LDLC SERPL CALC-MCNC: 44.8 MG/DL
NONHDLC SERPL-MCNC: 53 MG/DL
POTASSIUM SERPL-SCNC: 4.5 MMOL/L
PROT SERPL-MCNC: 6.7 G/DL
SODIUM SERPL-SCNC: 143 MMOL/L
TRIGL SERPL-MCNC: 41 MG/DL

## 2018-10-17 ENCOUNTER — OFFICE VISIT (OUTPATIENT)
Dept: INTERNAL MEDICINE | Facility: CLINIC | Age: 73
End: 2018-10-17
Payer: MEDICARE

## 2018-10-17 ENCOUNTER — PATIENT MESSAGE (OUTPATIENT)
Dept: PULMONOLOGY | Facility: CLINIC | Age: 73
End: 2018-10-17

## 2018-10-17 VITALS
DIASTOLIC BLOOD PRESSURE: 70 MMHG | HEART RATE: 82 BPM | SYSTOLIC BLOOD PRESSURE: 130 MMHG | BODY MASS INDEX: 33.83 KG/M2 | WEIGHT: 236.31 LBS | TEMPERATURE: 98 F | RESPIRATION RATE: 18 BRPM | HEIGHT: 70 IN

## 2018-10-17 DIAGNOSIS — D12.6 ADENOMATOUS POLYP OF COLON, UNSPECIFIED PART OF COLON: ICD-10-CM

## 2018-10-17 DIAGNOSIS — I10 HTN (HYPERTENSION), BENIGN: Primary | ICD-10-CM

## 2018-10-17 DIAGNOSIS — J43.2 CENTRILOBULAR EMPHYSEMA: ICD-10-CM

## 2018-10-17 DIAGNOSIS — G47.33 OSA (OBSTRUCTIVE SLEEP APNEA): ICD-10-CM

## 2018-10-17 DIAGNOSIS — E78.5 DYSLIPIDEMIA: ICD-10-CM

## 2018-10-17 DIAGNOSIS — I25.10 CORONARY ARTERY DISEASE INVOLVING NATIVE CORONARY ARTERY OF NATIVE HEART WITHOUT ANGINA PECTORIS: ICD-10-CM

## 2018-10-17 DIAGNOSIS — K76.0 NAFLD (NONALCOHOLIC FATTY LIVER DISEASE): ICD-10-CM

## 2018-10-17 PROCEDURE — 90662 IIV NO PRSV INCREASED AG IM: CPT | Mod: PBBFAC,PO

## 2018-10-17 PROCEDURE — 99999 PR PBB SHADOW E&M-EST. PATIENT-LVL III: CPT | Mod: PBBFAC,,, | Performed by: INTERNAL MEDICINE

## 2018-10-17 PROCEDURE — 99213 OFFICE O/P EST LOW 20 MIN: CPT | Mod: PBBFAC,PO | Performed by: INTERNAL MEDICINE

## 2018-10-17 PROCEDURE — 99214 OFFICE O/P EST MOD 30 MIN: CPT | Mod: S$PBB,,, | Performed by: INTERNAL MEDICINE

## 2018-10-17 NOTE — PROGRESS NOTES
History of present illness:  [age] male in today for general health assessment.  Also following up on hypertension, CAD, dyslipidemia, history of CAD and others.  Reports all generally doing well.  Taking all medications as directed.  No current symptoms of chest pain palpitations syncope cough shortness of breath.    Current medications:  All medications noted and reviewed in the electronic medical record medication list.    Review of systems:  General: no fever, chills, generalized body aches. No unexpected weight loss.  Eyes:  No visual disturbances.  HEENT:  No hoarseness, dysphagia, ear pain.  Respiratory:  No cough, no shortness of breath.  Cardiovascular: no chest pain, palpitations, cough, exertional limb pain. No edema.  GI: no nausea, vomiting.  No abdominal pain. No change in bowel habits.  No melena, no hematochezia.  : no dysuria. No change in the color or character of the urine. No urinary frequency.  Musculoskeletal: no new joint pain or swelling.  Neurologic:  No focal neurological complaints.  No headaches.  Skin:  No rashes or other concerns.  Psych:  No emotional issues    Past medical history, past surgical history, family medical history social history is are all noted and reviewed in the electronic medical record history sections.    Health screenings:  Colonoscopy March 2018.  He has had both pneumococcal vaccines.  He has had the influenza vaccine this season.  Eye exam up-to-date.    Physical examination:  GENERAL:  Alert, appropriately groomed, no acute distress.  VS:  Blood pressure taken manually is 130/70.  EYES: sclerae white ,nonicteric. PERRL.  HEENT:  Normocephalic. Ear canals and tympanic membranes normal. Mouth and pharynx normal. No thyromegaly. Trachea midline and freely mobile.  LUNGS:  Clear to ascultation and normal to percussion.  CARDIOVASCULAR:  Normal heart sounds.  No significant murmur. Carotids full bilaterally without bruit.  Pedal pulses intact .  No  abdominal bruit.  No peripheral extremity edema.  GI: the abdomen is soft, no distension. No masses , tenderness, organomegaly.  Rectal examination normal.  : scrotum, testicles and penis normal. Prostate without nodules or asymmetry.  LYMPHATIC:  No axillary, inguinal , cervical adenopathy.  MUSCULOSKELETAL:  Range of motion, stability and strength of the right and left upper and lower extremities normal. No swollen or tender joints  NEUROLOGIC:  DTR's normal. No gross motor or sensory deficits apparent, gait normal.  SKIN:  No rashes.   MS:  Alert, oriented , affect and mood all appropriate    Data:  Laboratory data noted and reviewed from October 10, 2018.  All reasonable.    Impression:  [age] male with several chronic medical conditions.  Hypertension is well controlled.  Dyslipidemia on statin therapy.  CAD clinically stable and followed by Cardiology.  Emphysema followed by pulmonology clinically stable.  Obstructive sleep apnea on CPAP.  Adenomatous colon polyps up-to-date with surveillance colonoscopy.  Hepatic steatosis.    Plan:  Continue current medical regimen.  Follow up with Cardiology and pulmonology as planned scheduled.  Colon rectal surgery advised 1 year colonoscopy which would be March April of 2019.  Return to clinic 1 year for annual health assessment or before if needed

## 2018-11-02 RX ORDER — ATORVASTATIN CALCIUM 80 MG/1
TABLET, FILM COATED ORAL
Qty: 30 TABLET | Refills: 6 | Status: SHIPPED | OUTPATIENT
Start: 2018-11-02 | End: 2019-06-03 | Stop reason: SDUPTHER

## 2018-11-07 DIAGNOSIS — J43.2 CENTRILOBULAR EMPHYSEMA: Chronic | ICD-10-CM

## 2018-11-07 RX ORDER — PREDNISONE 5 MG/1
TABLET ORAL
Qty: 16 TABLET | Refills: 6 | OUTPATIENT
Start: 2018-11-07

## 2018-11-12 ENCOUNTER — PATIENT MESSAGE (OUTPATIENT)
Dept: PULMONOLOGY | Facility: CLINIC | Age: 73
End: 2018-11-12

## 2018-11-14 DIAGNOSIS — K21.9 GASTROESOPHAGEAL REFLUX DISEASE WITHOUT ESOPHAGITIS: Primary | ICD-10-CM

## 2018-11-14 RX ORDER — OMEPRAZOLE 20 MG/1
20 CAPSULE, DELAYED RELEASE ORAL DAILY
Qty: 90 CAPSULE | Refills: 3 | Status: SHIPPED | OUTPATIENT
Start: 2018-11-14 | End: 2019-11-22 | Stop reason: SDUPTHER

## 2018-11-20 ENCOUNTER — OFFICE VISIT (OUTPATIENT)
Dept: PULMONOLOGY | Facility: CLINIC | Age: 73
End: 2018-11-20
Payer: MEDICARE

## 2018-11-20 VITALS
HEIGHT: 70 IN | DIASTOLIC BLOOD PRESSURE: 96 MMHG | OXYGEN SATURATION: 97 % | SYSTOLIC BLOOD PRESSURE: 138 MMHG | BODY MASS INDEX: 34.24 KG/M2 | HEART RATE: 81 BPM | WEIGHT: 239.19 LBS

## 2018-11-20 DIAGNOSIS — J44.9 CHRONIC OBSTRUCTIVE PULMONARY DISEASE, UNSPECIFIED COPD TYPE: Primary | ICD-10-CM

## 2018-11-20 PROCEDURE — 90670 PCV13 VACCINE IM: CPT | Mod: PBBFAC

## 2018-11-20 PROCEDURE — 99999 PR PBB SHADOW E&M-EST. PATIENT-LVL III: CPT | Mod: PBBFAC,,, | Performed by: INTERNAL MEDICINE

## 2018-11-20 PROCEDURE — 99214 OFFICE O/P EST MOD 30 MIN: CPT | Mod: S$PBB,,, | Performed by: INTERNAL MEDICINE

## 2018-11-20 PROCEDURE — 99213 OFFICE O/P EST LOW 20 MIN: CPT | Mod: PBBFAC | Performed by: INTERNAL MEDICINE

## 2018-11-21 NOTE — PROGRESS NOTES
Subjective:      Patient ID: Jordin Allen Jr. is a 73 y.o. male.    Chief Complaint: No chief complaint on file.    HPI73 yo retired  comes for his semi annual exam. No pulmonary issues today. No exacerbation since his visit April 24. No cardiac complaints.      No flowsheet data found.  Review of Systems   Constitutional: Negative.    HENT: Negative.    Eyes: Negative.    Respiratory: Negative.  Positive for dyspnea on extertion.         Moderate COPD   Stable   Cardiovascular: Negative.         S/P LAD stent 10/3/13   Genitourinary: Negative.    Musculoskeletal: Negative.    Skin: Negative.    Gastrointestinal: Negative.         GERD's   Neurological: Negative.    Psychiatric/Behavioral: Negative.      Objective:     Physical Exam   Constitutional: He is oriented to person, place, and time. He appears well-developed and well-nourished.   Obese;   Weighs 236   HENT:   Head: Normocephalic and atraumatic.   Right Ear: External ear normal.   Left Ear: External ear normal.   Eyes: Conjunctivae and EOM are normal. Pupils are equal, round, and reactive to light.   Neck: Normal range of motion. Neck supple.   Cardiovascular: Normal rate, regular rhythm and normal heart sounds.   Pulmonary/Chest: Effort normal and breath sounds normal.   Clear without wheezes or rales.    Abdominal: Soft. Bowel sounds are normal.   Musculoskeletal: Normal range of motion.   Neurological: He is alert and oriented to person, place, and time. He has normal reflexes.   Skin: Skin is warm and dry.   Psychiatric: He has a normal mood and affect. His behavior is normal. Judgment and thought content normal.       Assessment:     1. Chronic obstructive pulmonary disease, unspecified COPD type      Outpatient Encounter Medications as of 11/20/2018   Medication Sig Dispense Refill    albuterol 90 mcg/actuation inhaler Inhale 2 puffs into the lungs every 4 (four) hours as needed for Wheezing. 1 each 12    albuterol-ipratropium  2.5mg-0.5mg/3mL (DUO-NEB) 0.5 mg-3 mg(2.5 mg base)/3 mL nebulizer solution INHALE 1 VIAL VIA NEBULIZER EVERY 6 HOURS AS NEEDED 180 mL 5    amitriptyline (ELAVIL) 25 MG tablet TAKE 1 TABLET BY MOUTH EVERY DAY 30 tablet 6    aspirin (ECOTRIN) 81 MG EC tablet Take 81 mg by mouth once.      atorvastatin (LIPITOR) 80 MG tablet TAKE 1 TABLET (80 MG TOTAL) BY MOUTH ONCE DAILY. 30 tablet 6    BETA-CAROTENE,A, W-C & E/MIN (OCUVITE ORAL) Take by mouth once daily.       DALIRESP 500 mcg Tab TAKE 1 TABLET BY MOUTH EVERY DAY 30 tablet 6    echinacea 400 mg Cap Take by mouth once daily.       fluticasone (FLONASE) 50 mcg/actuation nasal spray INSTILL 1 SPRAY IN EACH NOSTRIL ONCE DAILY. 16 mL 3    hydroCHLOROthiazide (HYDRODIURIL) 25 MG tablet Take 0.5 tablets (12.5 mg total) by mouth once daily. 30 tablet 6    latanoprost 0.005 % ophthalmic solution Place 1 drop into both eyes once daily.       losartan (COZAAR) 100 MG tablet TAKE 1 TABLET BY MOUTH ONCE A DAY 30 tablet 3    nitroGLYCERIN (NITROSTAT) 0.4 MG SL tablet Place 1 tablet (0.4 mg total) under the tongue every 5 (five) minutes as needed. 25 tablet 3    omeprazole (PRILOSEC) 20 MG capsule Take 1 capsule (20 mg total) by mouth once daily. 90 capsule 3    predniSONE (DELTASONE) 5 MG tablet TAKE 1 TABLET BY MOUTH EVERY OTHER DAY. TAKE WITH FOOD. 16 tablet 6    SPIRIVA WITH HANDIHALER 18 mcg inhalation capsule INHALE 1 CAPSULE WITH INHALATION DEVICE ONCE DAILY 30 capsule 6    SPIRIVA WITH HANDIHALER 18 mcg inhalation capsule INHALE 1 CAPSULE WITH INHALATION DEVICE ONCE DAILY 30 capsule 6    SYMBICORT 160-4.5 mcg/actuation HFAA INHALE 2 PUFFS INTO THE LUNGS EVERY 12 HOURS. RINSE MOUTH AFTER USE 10.2 Inhaler 6     No facility-administered encounter medications on file as of 11/20/2018.        Plan:   Stable COPD  Problem List Items Addressed This Visit     Chronic obstructive pulmonary disease - Primary    Overview     72 yo male, former  comes for  his periodic pulmonary visit. He saw Dr. Estrada in April and has done well, with no COPD exacerbations. He recently moved to [place] to be closer to his daughter. He uses symbicort and spiriva and his maintenance medications. Peak flow 270 l/min  In April his Fev-1: 1.33 liters 47%. He is an avid golfer and plays 3-4 times a week weather permitting. He has a single cardiac stent.  Proximal LAD was occluded and was opened and a stent placed, 10/31.13  No further symptoms.         Current Assessment & Plan     Doing well with no new pulmonary complaints and Peak flow 270 l/min  Sa02: 97%  Chest clear without wheezes or rales. Will give prevnar immunization today IMP Stable Moderate COPD

## 2018-11-21 NOTE — ASSESSMENT & PLAN NOTE
Doing well with no new pulmonary complaints and Peak flow 270 l/min  Sa02: 97%  Chest clear without wheezes or rales. Will give prevnar immunization today IMP Stable Moderate COPD

## 2018-11-30 DIAGNOSIS — J43.2 CENTRILOBULAR EMPHYSEMA: Primary | Chronic | ICD-10-CM

## 2018-12-04 DIAGNOSIS — J43.2 CENTRILOBULAR EMPHYSEMA: Chronic | ICD-10-CM

## 2018-12-04 RX ORDER — PREDNISONE 5 MG/1
TABLET ORAL
Qty: 30 TABLET | Refills: 6 | Status: SHIPPED | OUTPATIENT
Start: 2018-12-04 | End: 2020-01-19

## 2018-12-05 ENCOUNTER — OFFICE VISIT (OUTPATIENT)
Dept: CARDIOLOGY | Facility: CLINIC | Age: 73
End: 2018-12-05
Payer: MEDICARE

## 2018-12-05 VITALS
HEIGHT: 70 IN | WEIGHT: 238.19 LBS | HEART RATE: 66 BPM | BODY MASS INDEX: 34.1 KG/M2 | SYSTOLIC BLOOD PRESSURE: 128 MMHG | DIASTOLIC BLOOD PRESSURE: 72 MMHG

## 2018-12-05 DIAGNOSIS — I10 HTN (HYPERTENSION), BENIGN: ICD-10-CM

## 2018-12-05 DIAGNOSIS — J43.2 CENTRILOBULAR EMPHYSEMA: Chronic | ICD-10-CM

## 2018-12-05 DIAGNOSIS — I25.10 CORONARY ARTERY DISEASE INVOLVING NATIVE CORONARY ARTERY OF NATIVE HEART WITHOUT ANGINA PECTORIS: Primary | Chronic | ICD-10-CM

## 2018-12-05 DIAGNOSIS — G47.33 OSA (OBSTRUCTIVE SLEEP APNEA): ICD-10-CM

## 2018-12-05 PROBLEM — Z12.11 SCREENING FOR COLON CANCER: Status: RESOLVED | Noted: 2018-03-21 | Resolved: 2018-12-05

## 2018-12-05 PROBLEM — Z01.811 PRE-OPERATIVE RESPIRATORY EXAMINATION: Status: RESOLVED | Noted: 2017-03-07 | Resolved: 2018-12-05

## 2018-12-05 PROCEDURE — 99214 OFFICE O/P EST MOD 30 MIN: CPT | Mod: S$PBB,,, | Performed by: INTERNAL MEDICINE

## 2018-12-05 PROCEDURE — 99213 OFFICE O/P EST LOW 20 MIN: CPT | Mod: PBBFAC,PO | Performed by: INTERNAL MEDICINE

## 2018-12-05 PROCEDURE — 99999 PR PBB SHADOW E&M-EST. PATIENT-LVL III: CPT | Mod: PBBFAC,,, | Performed by: INTERNAL MEDICINE

## 2018-12-05 NOTE — PROGRESS NOTES
"Subjective:   Patient ID:  Jordin Allen Jr. is a 73 y.o. male who presents for follow up of Coronary Artery Disease      HPI: Here a little early for yearly routine f/u.  Still playing golf 4-5 times per week at [facility] without any problems.    He is doing well with no new symptoms or cardiovascular complaints and no change in exercise capacity.  He denies chest discomfort, MONTIEL, palpitations, PND/orthopnea, lightheadedness and syncope.    He continues to take prednisone 5mg daily and is now seeing Dr. Pemberton as Dr. Estrada retired.    He decided against doing bariatric surgery.  His BMI today is 34.  He's down 5# since February.    Feb 2018 HPI: Routine yearly f/u.  Doing well with no complaints.  He did not end up going through with gastric bypass.  Weight is stable.  BMI registered today as slightly under 35 but that's because 5'10" was used as his height.  He's really more like 5'9".     He denies chest discomfort, MONTIEL, palpitations, PND/orthopnea, lightheadedness and syncope.     Still playing golf 4-5 times per week at [facility].  No issues or new limitations.     His BP was a little up today but he was worked up because of the parking situation today.  He brings with him, though, a list of BPs over the last two weeks and almost all of the readings are under 130 systolic, with several in the 100s-110s.        Feb 2017 HPI: Here for preoperative evaluation prior to gastric sleeve operation.  He continues with phase III rehab and continues to do very well.  He denies chest discomfort, MONTIEL, palpitations, PND/orthopnea, lightheadedness and syncope.        June 2016 HPI: Mr. Allen is a very pleasant man who has been seen by Dr. Pinedo at [facility] for an MI on [facility] three years ago. He had an occluded proximal LAD that was treated successfully and he retained normal systolic function.     Prior to his initial visit with me 15 months ago, he had had a rash and was inadvertently told by a nurse to hold his ASA " and Lipitor. He's back on that now and doing fine. He has had a problem with pruritic rashes in the past but that is not a problem currently and hasn't had a flare in about 3 months. These patches are now mainly on his anterior forearms.     He's going to phase III rehab now and doing well. He denies chest discomfort, MONTIEL, palpitations, PND/orthopnea, lightheadedness and syncope.     He takes prednisone 10mg every other day as directed by a Pulmonologist.    Patient Active Problem List   Diagnosis    Centrilobular emphysema    GERD (gastroesophageal reflux disease)    HTN (hypertension), benign    Sciatica    SHOLA (obstructive sleep apnea)    Restless leg syndrome    Benign neoplasm of colon    CAD (coronary artery disease)    Rash    Obesity, Class I, BMI 30-34.9    History of smoking 30 or more pack years    Chronic obstructive pulmonary disease    NAFLD (nonalcoholic fatty liver disease)    Adenomatous polyp of colon       Current Outpatient Medications   Medication Sig    albuterol 90 mcg/actuation inhaler Inhale 2 puffs into the lungs every 4 (four) hours as needed for Wheezing.    albuterol-ipratropium 2.5mg-0.5mg/3mL (DUO-NEB) 0.5 mg-3 mg(2.5 mg base)/3 mL nebulizer solution INHALE 1 VIAL VIA NEBULIZER EVERY 6 HOURS AS NEEDED    amitriptyline (ELAVIL) 25 MG tablet TAKE 1 TABLET BY MOUTH EVERY DAY    aspirin (ECOTRIN) 81 MG EC tablet Take 81 mg by mouth once.    atorvastatin (LIPITOR) 80 MG tablet TAKE 1 TABLET (80 MG TOTAL) BY MOUTH ONCE DAILY.    BETA-CAROTENE,A, W-C & E/MIN (OCUVITE ORAL) Take by mouth once daily.     DALIRESP 500 mcg Tab TAKE 1 TABLET BY MOUTH EVERY DAY    echinacea 400 mg Cap Take by mouth once daily.     fluticasone (FLONASE) 50 mcg/actuation nasal spray INSTILL 1 SPRAY IN EACH NOSTRIL ONCE DAILY.    hydroCHLOROthiazide (HYDRODIURIL) 25 MG tablet Take 0.5 tablets (12.5 mg total) by mouth once daily.    latanoprost 0.005 % ophthalmic solution Place 1 drop into both  eyes once daily.     losartan (COZAAR) 100 MG tablet TAKE 1 TABLET BY MOUTH ONCE A DAY    nitroGLYCERIN (NITROSTAT) 0.4 MG SL tablet Place 1 tablet (0.4 mg total) under the tongue every 5 (five) minutes as needed.    omeprazole (PRILOSEC) 20 MG capsule Take 1 capsule (20 mg total) by mouth once daily.    predniSONE (DELTASONE) 5 MG tablet TAKE 1 TABLET BY MOUTH EVERY OTHER DAY. TAKE WITH FOOD.    SPIRIVA WITH HANDIHALER 18 mcg inhalation capsule INHALE 1 CAPSULE WITH INHALATION DEVICE ONCE DAILY    SYMBICORT 160-4.5 mcg/actuation HFAA INHALE 2 PUFFS INTO THE LUNGS EVERY 12 HOURS. RINSE MOUTH AFTER USE     No current facility-administered medications for this visit.        Review of Systems   Constitution: Negative.   HENT: Negative.    Eyes: Negative.    Cardiovascular: Negative.  Negative for chest pain, dyspnea on exertion, near-syncope, orthopnea and palpitations.   Respiratory: Negative.  Negative for cough, hemoptysis and shortness of breath.    Endocrine: Negative.    Hematologic/Lymphatic: Negative.    Skin: Negative.    Musculoskeletal: Negative.    Gastrointestinal: Negative.    Genitourinary: Negative.    Neurological: Negative.    Psychiatric/Behavioral: Negative.      Objective:   Physical Exam   Constitutional: He is oriented to person, place, and time. He appears well-developed and well-nourished.   HENT:   Head: Normocephalic and atraumatic.   Mouth/Throat: Oropharynx is clear and moist.   Eyes: Conjunctivae and EOM are normal. No scleral icterus.   Neck: Normal range of motion. Neck supple. No JVD present.   Cardiovascular: Normal rate, regular rhythm, normal heart sounds and intact distal pulses. Exam reveals no gallop and no friction rub.   No murmur heard.  Pulmonary/Chest: Effort normal and breath sounds normal. He has no wheezes. He has no rales.   Abdominal: Soft. Bowel sounds are normal. He exhibits no distension. There is no tenderness.   Musculoskeletal: Normal range of motion. He  exhibits no edema.   Neurological: He is alert and oriented to person, place, and time.   Skin: Skin is warm and dry. No rash noted. No erythema.   Psychiatric: He has a normal mood and affect. His behavior is normal. Judgment and thought content normal.   Vitals reviewed.      Lab Results   Component Value Date    WBC 9.16 10/10/2018    HGB 13.2 (L) 10/10/2018    HCT 43.9 10/10/2018    MCV 87 10/10/2018     10/10/2018         Chemistry        Component Value Date/Time     10/10/2018 0705    K 4.5 10/10/2018 0705     10/10/2018 0705    CO2 25 10/10/2018 0705    BUN 20 10/10/2018 0705    CREATININE 1.0 10/10/2018 0705    GLU 92 10/10/2018 0705        Component Value Date/Time    CALCIUM 10.1 10/10/2018 0705    ALKPHOS 91 10/10/2018 0705    AST 21 10/10/2018 0705    ALT 29 10/10/2018 0705    BILITOT 0.8 10/10/2018 0705    ESTGFRAFRICA >60.0 10/10/2018 0705    EGFRNONAA >60.0 10/10/2018 0705            Lab Results   Component Value Date    CHOL 97 (L) 10/10/2018    CHOL 93 (L) 08/08/2017    CHOL 91 (L) 03/07/2017     Lab Results   Component Value Date    HDL 44 10/10/2018    HDL 43 08/08/2017    HDL 24 (L) 03/07/2017     Lab Results   Component Value Date    LDLCALC 44.8 (L) 10/10/2018    LDLCALC 35.4 (L) 08/08/2017    LDLCALC 44.8 (L) 03/07/2017     Lab Results   Component Value Date    TRIG 41 10/10/2018    TRIG 73 08/08/2017    TRIG 111 03/07/2017     Lab Results   Component Value Date    CHOLHDL 45.4 10/10/2018    CHOLHDL 46.2 08/08/2017    CHOLHDL 26.4 03/07/2017       Lab Results   Component Value Date    TSH 0.697 10/10/2018       Lab Results   Component Value Date    HGBA1C 5.8 05/30/2013       Assessment:     1. Coronary artery disease involving native coronary artery of native heart without angina pectoris    2. HTN (hypertension), benign    3. SHOLA (obstructive sleep apnea)    4. Centrilobular emphysema        Plan:     Continue current medicines.  Check with pharmacist re  of  losartan.    Diet/exercise goals reinforced.    F/U 12 months

## 2018-12-10 ENCOUNTER — DOCUMENTATION ONLY (OUTPATIENT)
Dept: AUDIOLOGY | Facility: CLINIC | Age: 73
End: 2018-12-10

## 2018-12-10 NOTE — PROGRESS NOTES
Niurka Garcia [id number]  [employer]  Invoice Date: 6/26/17    Lt SN [id number]  Rt SN [id number]  : 2xS  [employer]: [employer]  Warranty Exp 9/23/20

## 2018-12-11 RX ORDER — BUDESONIDE AND FORMOTEROL FUMARATE DIHYDRATE 160; 4.5 UG/1; UG/1
AEROSOL RESPIRATORY (INHALATION)
Qty: 10.2 INHALER | Refills: 6 | Status: CANCELLED | OUTPATIENT
Start: 2018-12-11

## 2018-12-12 RX ORDER — BUDESONIDE AND FORMOTEROL FUMARATE DIHYDRATE 160; 4.5 UG/1; UG/1
AEROSOL RESPIRATORY (INHALATION)
Qty: 3 INHALER | Refills: 3 | Status: SHIPPED | OUTPATIENT
Start: 2018-12-12 | End: 2019-12-11 | Stop reason: SDUPTHER

## 2018-12-16 ENCOUNTER — OFFICE VISIT (OUTPATIENT)
Dept: URGENT CARE | Facility: CLINIC | Age: 73
End: 2018-12-16
Payer: MEDICARE

## 2018-12-16 VITALS
RESPIRATION RATE: 20 BRPM | DIASTOLIC BLOOD PRESSURE: 78 MMHG | HEIGHT: 70 IN | OXYGEN SATURATION: 97 % | BODY MASS INDEX: 34.07 KG/M2 | SYSTOLIC BLOOD PRESSURE: 136 MMHG | TEMPERATURE: 98 F | HEART RATE: 77 BPM | WEIGHT: 238 LBS

## 2018-12-16 DIAGNOSIS — J06.9 UPPER RESPIRATORY TRACT INFECTION, UNSPECIFIED TYPE: ICD-10-CM

## 2018-12-16 DIAGNOSIS — J40 BRONCHITIS: Primary | ICD-10-CM

## 2018-12-16 PROCEDURE — 99214 OFFICE O/P EST MOD 30 MIN: CPT | Mod: S$GLB,,, | Performed by: PHYSICIAN ASSISTANT

## 2018-12-16 RX ORDER — PREDNISONE 20 MG/1
40 TABLET ORAL DAILY
Qty: 10 TABLET | Refills: 0 | Status: SHIPPED | OUTPATIENT
Start: 2018-12-16 | End: 2018-12-21

## 2018-12-16 RX ORDER — PROMETHAZINE HYDROCHLORIDE AND DEXTROMETHORPHAN HYDROBROMIDE 6.25; 15 MG/5ML; MG/5ML
5 SYRUP ORAL EVERY 6 HOURS PRN
Qty: 120 ML | Refills: 0 | Status: SHIPPED | OUTPATIENT
Start: 2018-12-16 | End: 2018-12-26

## 2018-12-16 NOTE — PROGRESS NOTES
"Subjective:       Patient ID: Jordin Allen Jr. is a 73 y.o. male.    Vitals:  height is 5' 10" (1.778 m) and weight is 108 kg (238 lb). His oral temperature is 97.9 °F (36.6 °C). His blood pressure is 136/78 and his pulse is 77. His respiration is 20 and oxygen saturation is 97%.     Chief Complaint: URI    This is a 73 y.o. male who presents today with a chief complaint of cough since Thursday.  Patient states he has post nasal drip and a slight fever (100.0).  Pt also states he has the chills and cannot seem to get warm.  He has taken Mucinex, Tylenol, and a cough syrup for this.        URI    This is a new problem. The current episode started in the past 7 days (Thursday). The problem has been unchanged. There has been no fever. Associated symptoms include coughing. Pertinent negatives include no congestion, ear pain, nausea, rash, sinus pain, sore throat, vomiting or wheezing. Treatments tried: Mucinex, Tylenol,  The treatment provided mild relief.       Constitution: Positive for chills. Negative for sweating, fatigue and fever.   HENT: Positive for postnasal drip. Negative for ear pain, congestion, sinus pain, sinus pressure, sore throat and voice change.    Neck: Negative for painful lymph nodes.   Eyes: Negative for eye redness.   Respiratory: Positive for cough and sputum production. Negative for chest tightness, bloody sputum, COPD, shortness of breath, stridor, wheezing and asthma.    Gastrointestinal: Negative for nausea and vomiting.   Musculoskeletal: Negative for muscle ache.   Skin: Negative for rash and erythema.   Allergic/Immunologic: Negative for seasonal allergies and asthma.   Hematologic/Lymphatic: Negative for swollen lymph nodes.       Objective:      Physical Exam   Constitutional: He is oriented to person, place, and time. He appears well-developed and well-nourished. No distress.   HENT:   Head: Normocephalic and atraumatic.   Right Ear: Hearing, tympanic membrane, external ear and " ear canal normal.   Left Ear: Hearing, tympanic membrane, external ear and ear canal normal.   Nose: Mucosal edema and rhinorrhea present. Right sinus exhibits no maxillary sinus tenderness and no frontal sinus tenderness. Left sinus exhibits no maxillary sinus tenderness and no frontal sinus tenderness.   Mouth/Throat: Uvula is midline and oropharynx is clear and moist.   Eyes: Conjunctivae are normal.   Neck: Normal range of motion. Neck supple.   Cardiovascular: Normal rate and regular rhythm. Exam reveals no gallop and no friction rub.   No murmur heard.  Pulmonary/Chest: Effort normal and breath sounds normal. He has no wheezes. He has no rales.   Musculoskeletal: Normal range of motion.   Neurological: He is alert and oriented to person, place, and time.   Skin: Skin is warm and dry. No rash noted. No erythema.   Psychiatric: He has a normal mood and affect. His behavior is normal. Judgment and thought content normal.   Nursing note and vitals reviewed.      Assessment:       1. Bronchitis    2. Upper respiratory tract infection, unspecified type        Plan:         Bronchitis  -     predniSONE (DELTASONE) 20 MG tablet; Take 2 tablets (40 mg total) by mouth once daily. for 5 days  Dispense: 10 tablet; Refill: 0  -     promethazine-dextromethorphan (PROMETHAZINE-DM) 6.25-15 mg/5 mL Syrp; Take 5 mLs by mouth every 6 (six) hours as needed (cough).  Dispense: 120 mL; Refill: 0    Upper respiratory tract infection, unspecified type        Jordin was seen today for uri.    Diagnoses and all orders for this visit:    Bronchitis  -     predniSONE (DELTASONE) 20 MG tablet; Take 2 tablets (40 mg total) by mouth once daily. for 5 days  -     promethazine-dextromethorphan (PROMETHAZINE-DM) 6.25-15 mg/5 mL Syrp; Take 5 mLs by mouth every 6 (six) hours as needed (cough).    Upper respiratory tract infection, unspecified type      Patient Instructions   - Rest.    - Drink plenty of fluids.    - Tylenol or Ibuprofen as  directed as needed for fever/pain.    - Take over-the-counter claritin, zyrtec, allegra, or xyzal as directed.  - Use over the counter Flonase as directed for sinus congestion and postnasal drip.  - use nasal saline prior to Flonase.  - Use Ocean Spray Nasal Saline 1-3 puffs each nostril every 2-3 hours then blow out onto tissue. This is to irrigate the nasal passage way to clear the sinus openings. Use until sinus problem resolved.   - Follow up with your PCP or specialty clinic as directed in the next 1-2 weeks if not improved or as needed.  You can call [phone] to schedule an appointment with the appropriate provider.    - Go to the ED if your symptoms worsen.  - You must understand that you have received an Urgent Care treatment only and that you may be released before all of your medical problems are known or treated.   - You, the patient, will arrange for follow up care as instructed.   - If your condition worsens or fails to improve we recommend that you receive another evaluation at the ER immediately or contact your PCP to discuss your concerns or return here.       Bronchitis, Viral (Adult)    You have a viral bronchitis. Bronchitis is inflammation and swelling of the lining of the lungs. This is often caused by an infection. Symptoms include a dry, hacking cough that is worse at night. The cough may bring up yellow-green mucus. You may also feel short of breath or wheeze. Other symptoms may include tiredness, chest discomfort, and chills.  Bronchitis that is caused by a virus is not treated with antibiotics. Instead, medicines may be given to help relieve symptoms. Symptoms can last up to 2 weeks, although the cough may last much longer.  This illness is contagious during the first few days and is spread through the air by coughing and sneezing, or by direct contact (touching the sick person and then touching your own eyes, nose, or mouth).  Most viral illnesses resolve within 10 to 14 days with  rest and simple home remedies, although they may sometimes last for several weeks.  Home care  · If symptoms are severe, rest at home for the first 2 to 3 days. When you go back to your usual activities, don't let yourself get too tired.  · Do not smoke. Also avoid being exposed to secondhand smoke.  · You may use over-the-counter medicine to control fever or pain, unless another pain medicine was prescribed. (Note: If you have chronic liver or kidney disease or have ever had a stomach ulcer or gastrointestinal bleeding, talk with your healthcare provider before using these medicines. Also talk to your provider if you are taking medicine to prevent blood clots.) Aspirin should never be given to anyone younger than 18 years of age who is ill with a viral infection or fever. It may cause severe liver or brain damage.  · Your appetite may be poor, so a light diet is fine. Avoid dehydration by drinking 6 to 8 glasses of fluids per day (such as water, soft drinks, sports drinks, juices, tea, or soup). Extra fluids will help loosen secretions in the nose and lungs.  · Over-the-counter cough, cold, and sore-throat medicines will not shorten the length of the illness, but they may help to reduce symptoms. (Note: Do not use decongestants if you have high blood pressure.)  Follow-up care  Follow up with your healthcare provider, or as advised. If you had an X-ray or ECG (electrocardiogram), a specialist will review it. You will be notified of any new findings that may affect your care.  Note: If you are age 65 or older, or if you have a chronic lung disease or condition that affects your immune system, or you smoke, talk to your healthcare provider about having pneumococcal vaccinations and a yearly influenza vaccination (flu shot).  When to seek medical advice  Call your healthcare provider right away if any of these occur:  · Fever of 100.4°F (38°C) or higher  · Coughing up increased amounts of colored sputum  · Weakness,  drowsiness, headache, facial pain, ear pain, or a stiff neck  Call 911, or get immediate medical care  Contact emergency services right away if any of these occur:  · Coughing up blood  · Worsening weakness, drowsiness, headache, or stiff neck  · Trouble breathing, wheezing, or pain with breathing  Date Last Reviewed: 9/13/2015 © 2000-2017 [employer]. [address] [place] [address]. All rights reserved. This information is not intended as a substitute for professional medical care. Always follow your healthcare professional's instructions.        Viral Upper Respiratory Illness with Wheezing (Adult)  You have a viral upper respiratory illness (URI), which is another term for the common cold. When the infection causes a lot of irritation, the air passages can go into spasm. This causes wheezing and shortness of breath.    This illness is contagious during the first few days. It is spread through the air by coughing and sneezing. It may also be spread by direct contact (touching the sick person and then touching your own eyes, nose, or mouth). Frequent handwashing will decrease the risk.  Most viral illnesses go away within 7 to 10 days with rest and simple home remedies. Sometimes the illness may last for several weeks. Antibiotics will not kill a virus, and they are generally not prescribed for this condition.  Home care  · If symptoms are severe, rest at home for the first 2 to 3 days. When you resume activity, don't let yourself get too tired.  · Avoid being exposed to cigarette smoke (yours or others).  · You may use acetaminophen or ibuprofen to control pain and fever, unless another medicine was prescribed. (Note: If you have chronic liver or kidney disease, have ever had a stomach ulcer or gastrointestinal bleeding, or are taking blood-thinning medicines, talk with your healthcare provider before using these medicines.) Aspirin should never be given to anyone under 18 years of age  who is ill with a viral infection or fever. It may cause severe liver or brain damage.  · Your appetite may be poor, so a light diet is fine. Avoid dehydration by drinking 6 to 8 glasses of fluids per day (water, soft drinks, juices, tea, or soup). Extra fluids will help loosen secretions in the nose and lungs.  · Over-the-counter cold medicines will not shorten the length of time youre sick, but they may be helpful for the following symptoms: cough, sore throat, and nasal and sinus congestion. (Note: Do not use decongestants if you have high blood pressure.)  Follow-up care  Follow up with your healthcare provider, or as advised.  When to seek medical advice  Call your healthcare provider right away if any of these occur:  · Cough with lots of colored sputum (mucus)  · Severe headache; face, neck, or ear pain  · Difficulty swallowing due to throat pain  · Fever of 100.4ºF (38ºC) or higher, or as directed by your healthcare provider  Call 911, or get immediate medical care  Call emergency services right away if any of these occur:  · Chest pain, shortness of breath, worsening wheezing, or difficulty breathing  · Coughing up blood  · Inability to swallow due to throat pain  Date Last Reviewed: 9/13/2015  © 8540-8748 [employer]. [address] [place] [address], [place] [address]. All rights reserved. This information is not intended as a substitute for professional medical care. Always follow your healthcare professional's instructions.

## 2018-12-16 NOTE — PATIENT INSTRUCTIONS
- Rest.    - Drink plenty of fluids.    - Tylenol or Ibuprofen as directed as needed for fever/pain.    - Take over-the-counter claritin, zyrtec, allegra, or xyzal as directed.  - Use over the counter Flonase as directed for sinus congestion and postnasal drip.  - use nasal saline prior to Flonase.  - Use Ocean Spray Nasal Saline 1-3 puffs each nostril every 2-3 hours then blow out onto tissue. This is to irrigate the nasal passage way to clear the sinus openings. Use until sinus problem resolved.   - Follow up with your PCP or specialty clinic as directed in the next 1-2 weeks if not improved or as needed.  You can call [phone] to schedule an appointment with the appropriate provider.    - Go to the ED if your symptoms worsen.  - You must understand that you have received an Urgent Care treatment only and that you may be released before all of your medical problems are known or treated.   - You, the patient, will arrange for follow up care as instructed.   - If your condition worsens or fails to improve we recommend that you receive another evaluation at the ER immediately or contact your PCP to discuss your concerns or return here.       Bronchitis, Viral (Adult)    You have a viral bronchitis. Bronchitis is inflammation and swelling of the lining of the lungs. This is often caused by an infection. Symptoms include a dry, hacking cough that is worse at night. The cough may bring up yellow-green mucus. You may also feel short of breath or wheeze. Other symptoms may include tiredness, chest discomfort, and chills.  Bronchitis that is caused by a virus is not treated with antibiotics. Instead, medicines may be given to help relieve symptoms. Symptoms can last up to 2 weeks, although the cough may last much longer.  This illness is contagious during the first few days and is spread through the air by coughing and sneezing, or by direct contact (touching the sick person and then touching your own eyes, nose, or  mouth).  Most viral illnesses resolve within 10 to 14 days with rest and simple home remedies, although they may sometimes last for several weeks.  Home care  · If symptoms are severe, rest at home for the first 2 to 3 days. When you go back to your usual activities, don't let yourself get too tired.  · Do not smoke. Also avoid being exposed to secondhand smoke.  · You may use over-the-counter medicine to control fever or pain, unless another pain medicine was prescribed. (Note: If you have chronic liver or kidney disease or have ever had a stomach ulcer or gastrointestinal bleeding, talk with your healthcare provider before using these medicines. Also talk to your provider if you are taking medicine to prevent blood clots.) Aspirin should never be given to anyone younger than 18 years of age who is ill with a viral infection or fever. It may cause severe liver or brain damage.  · Your appetite may be poor, so a light diet is fine. Avoid dehydration by drinking 6 to 8 glasses of fluids per day (such as water, soft drinks, sports drinks, juices, tea, or soup). Extra fluids will help loosen secretions in the nose and lungs.  · Over-the-counter cough, cold, and sore-throat medicines will not shorten the length of the illness, but they may help to reduce symptoms. (Note: Do not use decongestants if you have high blood pressure.)  Follow-up care  Follow up with your healthcare provider, or as advised. If you had an X-ray or ECG (electrocardiogram), a specialist will review it. You will be notified of any new findings that may affect your care.  Note: If you are age 65 or older, or if you have a chronic lung disease or condition that affects your immune system, or you smoke, talk to your healthcare provider about having pneumococcal vaccinations and a yearly influenza vaccination (flu shot).  When to seek medical advice  Call your healthcare provider right away if any of these occur:  · Fever of 100.4°F (38°C) or  higher  · Coughing up increased amounts of colored sputum  · Weakness, drowsiness, headache, facial pain, ear pain, or a stiff neck  Call 911, or get immediate medical care  Contact emergency services right away if any of these occur:  · Coughing up blood  · Worsening weakness, drowsiness, headache, or stiff neck  · Trouble breathing, wheezing, or pain with breathing  Date Last Reviewed: 9/13/2015 © 2000-2017 [employer]. [address] [place] [address]. All rights reserved. This information is not intended as a substitute for professional medical care. Always follow your healthcare professional's instructions.        Viral Upper Respiratory Illness with Wheezing (Adult)  You have a viral upper respiratory illness (URI), which is another term for the common cold. When the infection causes a lot of irritation, the air passages can go into spasm. This causes wheezing and shortness of breath.    This illness is contagious during the first few days. It is spread through the air by coughing and sneezing. It may also be spread by direct contact (touching the sick person and then touching your own eyes, nose, or mouth). Frequent handwashing will decrease the risk.  Most viral illnesses go away within 7 to 10 days with rest and simple home remedies. Sometimes the illness may last for several weeks. Antibiotics will not kill a virus, and they are generally not prescribed for this condition.  Home care  · If symptoms are severe, rest at home for the first 2 to 3 days. When you resume activity, don't let yourself get too tired.  · Avoid being exposed to cigarette smoke (yours or others).  · You may use acetaminophen or ibuprofen to control pain and fever, unless another medicine was prescribed. (Note: If you have chronic liver or kidney disease, have ever had a stomach ulcer or gastrointestinal bleeding, or are taking blood-thinning medicines, talk with your healthcare provider before using these  medicines.) Aspirin should never be given to anyone under 18 years of age who is ill with a viral infection or fever. It may cause severe liver or brain damage.  · Your appetite may be poor, so a light diet is fine. Avoid dehydration by drinking 6 to 8 glasses of fluids per day (water, soft drinks, juices, tea, or soup). Extra fluids will help loosen secretions in the nose and lungs.  · Over-the-counter cold medicines will not shorten the length of time youre sick, but they may be helpful for the following symptoms: cough, sore throat, and nasal and sinus congestion. (Note: Do not use decongestants if you have high blood pressure.)  Follow-up care  Follow up with your healthcare provider, or as advised.  When to seek medical advice  Call your healthcare provider right away if any of these occur:  · Cough with lots of colored sputum (mucus)  · Severe headache; face, neck, or ear pain  · Difficulty swallowing due to throat pain  · Fever of 100.4ºF (38ºC) or higher, or as directed by your healthcare provider  Call 911, or get immediate medical care  Call emergency services right away if any of these occur:  · Chest pain, shortness of breath, worsening wheezing, or difficulty breathing  · Coughing up blood  · Inability to swallow due to throat pain  Date Last Reviewed: 9/13/2015  © 4635-5160 [employer]. [address] [place] [address], [place] [address]. All rights reserved. This information is not intended as a substitute for professional medical care. Always follow your healthcare professional's instructions.

## 2019-01-04 ENCOUNTER — OFFICE VISIT (OUTPATIENT)
Dept: URGENT CARE | Facility: CLINIC | Age: 74
End: 2019-01-04
Payer: MEDICARE

## 2019-01-04 VITALS
HEART RATE: 70 BPM | HEIGHT: 70 IN | SYSTOLIC BLOOD PRESSURE: 142 MMHG | DIASTOLIC BLOOD PRESSURE: 80 MMHG | TEMPERATURE: 97 F | WEIGHT: 235 LBS | RESPIRATION RATE: 18 BRPM | OXYGEN SATURATION: 96 % | BODY MASS INDEX: 33.64 KG/M2

## 2019-01-04 DIAGNOSIS — H60.503 ACUTE OTITIS EXTERNA OF BOTH EARS, UNSPECIFIED TYPE: Primary | ICD-10-CM

## 2019-01-04 PROCEDURE — 99214 PR OFFICE/OUTPT VISIT, EST, LEVL IV, 30-39 MIN: ICD-10-PCS | Mod: S$GLB,,, | Performed by: NURSE PRACTITIONER

## 2019-01-04 PROCEDURE — 99214 OFFICE O/P EST MOD 30 MIN: CPT | Mod: S$GLB,,, | Performed by: NURSE PRACTITIONER

## 2019-01-04 RX ORDER — NEOMYCIN SULFATE, POLYMYXIN B SULFATE, HYDROCORTISONE 3.5; 10000; 1 MG/ML; [USP'U]/ML; MG/ML
3 SOLUTION/ DROPS AURICULAR (OTIC) 3 TIMES DAILY
Qty: 10 ML | Refills: 0 | Status: SHIPPED | OUTPATIENT
Start: 2019-01-04 | End: 2019-01-14

## 2019-01-04 NOTE — PATIENT INSTRUCTIONS
Continue using Flonase twice daily.    Refrain from using your hearing aids, as you have done, until symptoms resolve.      External Ear Infection (Adult)    External otitis (also called swimmers ear) is an infection in the ear canal. It is often caused by bacteria or fungus. It can occur a few days after water gets trapped in the ear canal (from swimming or bathing). It can also occur after cleaning too deeply in the ear canal with a cotton swab or other object. Sometimes, hair care products get into the ear canal and cause this problem.  Symptoms can include pain, fever, itching, redness, drainage, or swelling of the ear canal. Temporary hearing loss may also occur.  Home care  · Do not try to clean the ear canal. This can push pus and bacteria deeper into the canal.  · Use prescribed ear drops as directed. These help reduce swelling and fight the infection. If an ear wick was placed in the ear canal, apply drops right onto the end of the wick. The wick will draw the medication into the ear canal even if it is swollen closed.  · A cotton ball may be loosely placed in the outer ear to absorb any drainage.  · You may use acetaminophen or ibuprofen to control pain, unless another medication was prescribed. Note: If you have chronic liver or kidney disease or ever had a stomach ulcer or GI bleeding, talk to your health care provider before taking any of these medications.  · Do not allow water to get into your ear when bathing. Also, avoid swimming until the infection has cleared.  Prevention  · Keep your ears dry. This helps lower the risk of infection. Dry your ears with a towel or hair dryer after getting wet. Also, use ear plugs when swimming.  · Do not stick any objects in the ear to remove wax.  · If you feel water trapped in your ear, use ear drops right away. You can get these drops over the counter at most drugstores. They work by removing water from the ear canal.  Follow-up care  Follow up with your  health care provider in one week, or as advised.  When to seek medical advice  Call your health care provider right away if any of these occur:  · Ear pain becomes worse or doesnt improve after 3 days of treatment  · Redness or swelling of the outer ear occurs or gets worse  · Headache  · Painful or stiff neck  · Drowsiness or confusion  · Fever of 100.4ºF (38ºC) or higher, or as directed by your health care provider  · Seizure  Date Last Reviewed: 3/22/2015  © 8732-7019 [employer]. [address] [place] [address]. All rights reserved. This information is not intended as a substitute for professional medical care. Always follow your healthcare professional's instructions.

## 2019-01-04 NOTE — PROGRESS NOTES
"Subjective:       Patient ID: Jordin Allen Jr. is a 73 y.o. male.    Vitals:  height is 5' 10" (1.778 m) and weight is 106.6 kg (235 lb). His oral temperature is 97.3 °F (36.3 °C). His blood pressure is 142/80 (abnormal) and his pulse is 70. His respiration is 18 and oxygen saturation is 96%.     Chief Complaint: Otalgia    This is a 73 y.o. male who presents today with a chief complaint of   Left ear feels stopped up for about 4-5 days.    Recent illness, including sinus congestion and cough.  That issue is resolved, but last few days have had ear congestion of left side.  Uses Flonase nightly.    No fever, chills, pain to ear, water sports or known sick contacts. No N/V/D or dizziness.    Pertinent use of bilateral hearing aids.  Has not used for last 2-3 days.      Otalgia    There is pain in the left ear. This is a new problem. The current episode started in the past 7 days. The problem occurs constantly. The problem has been gradually worsening. There has been no fever. The pain is at a severity of 0/10. The patient is experiencing no pain. Pertinent negatives include no coughing, rash, sore throat or vomiting. He has tried nothing for the symptoms. The treatment provided no relief.       Constitution: Negative for chills, sweating, fatigue and fever.   HENT: Positive for ear pain and congestion. Negative for sinus pain, sinus pressure, sore throat and voice change.    Neck: Negative for painful lymph nodes.   Eyes: Negative for eye redness.   Respiratory: Negative for chest tightness, cough, sputum production, bloody sputum, COPD, shortness of breath, stridor, wheezing and asthma.    Gastrointestinal: Negative for nausea and vomiting.   Musculoskeletal: Negative for muscle ache.   Skin: Negative for rash.   Allergic/Immunologic: Negative for seasonal allergies and asthma.   Hematologic/Lymphatic: Negative for swollen lymph nodes.       Objective:      Physical Exam   Constitutional: He is oriented to " person, place, and time. He appears well-developed and well-nourished. He is cooperative.  Non-toxic appearance. He does not appear ill. No distress.   HENT:   Head: Normocephalic and atraumatic.   Right Ear: Hearing, tympanic membrane and external ear normal. There is swelling.   Left Ear: External ear normal. There is drainage and swelling. Tympanic membrane is erythematous. Decreased hearing is noted.   Nose: Rhinorrhea present. No mucosal edema or nasal deformity. No epistaxis. Right sinus exhibits no maxillary sinus tenderness and no frontal sinus tenderness. Left sinus exhibits no maxillary sinus tenderness and no frontal sinus tenderness.   Mouth/Throat: Uvula is midline, oropharynx is clear and moist and mucous membranes are normal. No trismus in the jaw. Normal dentition. No uvula swelling. No posterior oropharyngeal erythema.   White waxy residue to left canal   Eyes: Conjunctivae and lids are normal. No scleral icterus.   Sclera clear bilat   Neck: Trachea normal, full passive range of motion without pain and phonation normal. Neck supple.   Cardiovascular: Normal rate, regular rhythm, normal heart sounds, intact distal pulses and normal pulses.   Pulmonary/Chest: Effort normal and breath sounds normal. No stridor. No respiratory distress. He has no decreased breath sounds. He has no wheezes.   Abdominal: Soft. Normal appearance and bowel sounds are normal. He exhibits no distension. There is no tenderness.   Musculoskeletal: Normal range of motion. He exhibits no edema or deformity.   Neurological: He is alert and oriented to person, place, and time. He exhibits normal muscle tone. Coordination normal.   Skin: Skin is warm, dry and intact. He is not diaphoretic. No pallor.   Psychiatric: He has a normal mood and affect. His speech is normal and behavior is normal. Judgment and thought content normal. Cognition and memory are normal.   Nursing note and vitals reviewed.      Assessment:       1. Acute  otitis externa of both ears, unspecified type        Plan:         Acute otitis externa of both ears, unspecified type  -     neomycin-polymyxin-hydrocortisone (CORTISPORIN) otic solution; Place 3 drops into both ears 3 (three) times daily. for 10 days  Dispense: 10 mL; Refill: 0      Patient Instructions   Continue using Flonase twice daily.    Refrain from using your hearing aids, as you have done, until symptoms resolve.      External Ear Infection (Adult)    External otitis (also called swimmers ear) is an infection in the ear canal. It is often caused by bacteria or fungus. It can occur a few days after water gets trapped in the ear canal (from swimming or bathing). It can also occur after cleaning too deeply in the ear canal with a cotton swab or other object. Sometimes, hair care products get into the ear canal and cause this problem.  Symptoms can include pain, fever, itching, redness, drainage, or swelling of the ear canal. Temporary hearing loss may also occur.  Home care  · Do not try to clean the ear canal. This can push pus and bacteria deeper into the canal.  · Use prescribed ear drops as directed. These help reduce swelling and fight the infection. If an ear wick was placed in the ear canal, apply drops right onto the end of the wick. The wick will draw the medication into the ear canal even if it is swollen closed.  · A cotton ball may be loosely placed in the outer ear to absorb any drainage.  · You may use acetaminophen or ibuprofen to control pain, unless another medication was prescribed. Note: If you have chronic liver or kidney disease or ever had a stomach ulcer or GI bleeding, talk to your health care provider before taking any of these medications.  · Do not allow water to get into your ear when bathing. Also, avoid swimming until the infection has cleared.  Prevention  · Keep your ears dry. This helps lower the risk of infection. Dry your ears with a towel or hair dryer after getting  wet. Also, use ear plugs when swimming.  · Do not stick any objects in the ear to remove wax.  · If you feel water trapped in your ear, use ear drops right away. You can get these drops over the counter at most drugstores. They work by removing water from the ear canal.  Follow-up care  Follow up with your health care provider in one week, or as advised.  When to seek medical advice  Call your health care provider right away if any of these occur:  · Ear pain becomes worse or doesnt improve after 3 days of treatment  · Redness or swelling of the outer ear occurs or gets worse  · Headache  · Painful or stiff neck  · Drowsiness or confusion  · Fever of 100.4ºF (38ºC) or higher, or as directed by your health care provider  · Seizure  Date Last Reviewed: 3/22/2015  © 4291-9528 [employer]. [address] [place] [address]. All rights reserved. This information is not intended as a substitute for professional medical care. Always follow your healthcare professional's instructions.

## 2019-01-07 ENCOUNTER — DOCUMENTATION ONLY (OUTPATIENT)
Dept: AUDIOLOGY | Facility: CLINIC | Age: 74
End: 2019-01-07

## 2019-01-07 NOTE — PROGRESS NOTES
Patient complained that vol was not working on hearing aids.  We will send them in for repair and also to have them cleaned and checked.  I will call patient once we receive.     Niurka Garcia [id number]  [employer]  Invoice Date: 6/26/17     Lt SN [id number]  Rt SN [id number]  : 2xS  Dome: [employer] Power  Warranty Exp 9/23/20

## 2019-01-09 RX ORDER — LOSARTAN POTASSIUM 100 MG/1
TABLET ORAL
Qty: 30 TABLET | Refills: 3 | Status: SHIPPED | OUTPATIENT
Start: 2019-01-09 | End: 2019-04-08 | Stop reason: SDUPTHER

## 2019-01-14 ENCOUNTER — HOSPITAL ENCOUNTER (OUTPATIENT)
Dept: RADIOLOGY | Facility: HOSPITAL | Age: 74
Discharge: HOME | End: 2019-01-14
Attending: INTERNAL MEDICINE
Payer: MEDICARE

## 2019-01-14 ENCOUNTER — TELEPHONE (OUTPATIENT)
Dept: AUDIOLOGY | Facility: CLINIC | Age: 74
End: 2019-01-14

## 2019-01-14 ENCOUNTER — DOCUMENTATION ONLY (OUTPATIENT)
Dept: AUDIOLOGY | Facility: CLINIC | Age: 74
End: 2019-01-14

## 2019-01-14 DIAGNOSIS — J43.2 CENTRILOBULAR EMPHYSEMA: Chronic | ICD-10-CM

## 2019-01-14 PROCEDURE — 71046 XR CHEST PA AND LATERAL: ICD-10-PCS | Mod: 26,,, | Performed by: RADIOLOGY

## 2019-01-14 PROCEDURE — 71046 X-RAY EXAM CHEST 2 VIEWS: CPT | Mod: TC,FY

## 2019-01-14 PROCEDURE — 71046 X-RAY EXAM CHEST 2 VIEWS: CPT | Mod: 26,,, | Performed by: RADIOLOGY

## 2019-01-14 NOTE — PROGRESS NOTES
Received in warranty repair from [email or web address].  Action Taken:   - Both hearing aids -- replaced electronics, housing,  and domes   -  -- replaced     Phonak Radha [id number]  [employer]  Invoice Date: 6/26/17     Lt SN [id number]  Rt SN [id number]  : 2xS  Dome: [employer] Power  Warranty Exp 9/23/20     SN [id number]

## 2019-01-15 ENCOUNTER — DOCUMENTATION ONLY (OUTPATIENT)
Dept: AUDIOLOGY | Facility: CLINIC | Age: 74
End: 2019-01-15

## 2019-01-15 ENCOUNTER — OFFICE VISIT (OUTPATIENT)
Dept: UROLOGY | Facility: CLINIC | Age: 74
End: 2019-01-15
Payer: MEDICARE

## 2019-01-15 VITALS
HEIGHT: 70 IN | SYSTOLIC BLOOD PRESSURE: 138 MMHG | HEART RATE: 98 BPM | BODY MASS INDEX: 34.63 KG/M2 | DIASTOLIC BLOOD PRESSURE: 84 MMHG | WEIGHT: 241.88 LBS

## 2019-01-15 DIAGNOSIS — N49.2 SCROTAL ABSCESS: ICD-10-CM

## 2019-01-15 DIAGNOSIS — K76.0 NAFLD (NONALCOHOLIC FATTY LIVER DISEASE): ICD-10-CM

## 2019-01-15 DIAGNOSIS — J43.2 CENTRILOBULAR EMPHYSEMA: Chronic | ICD-10-CM

## 2019-01-15 DIAGNOSIS — G47.33 OSA (OBSTRUCTIVE SLEEP APNEA): ICD-10-CM

## 2019-01-15 DIAGNOSIS — I10 HTN (HYPERTENSION), BENIGN: ICD-10-CM

## 2019-01-15 DIAGNOSIS — Z87.891 HISTORY OF SMOKING 30 OR MORE PACK YEARS: Primary | ICD-10-CM

## 2019-01-15 PROCEDURE — 55100 DRAINAGE OF SCROTUM ABSCESS: CPT | Mod: S$PBB,,, | Performed by: UROLOGY

## 2019-01-15 PROCEDURE — 87070 CULTURE OTHR SPECIMN AEROBIC: CPT

## 2019-01-15 PROCEDURE — 99213 OFFICE O/P EST LOW 20 MIN: CPT | Mod: PBBFAC | Performed by: UROLOGY

## 2019-01-15 PROCEDURE — 99204 OFFICE O/P NEW MOD 45 MIN: CPT | Mod: S$PBB,25,, | Performed by: UROLOGY

## 2019-01-15 PROCEDURE — 55100 PR DRAINAGE SCROTAL WALL ABSCESS: ICD-10-PCS | Mod: S$PBB,,, | Performed by: UROLOGY

## 2019-01-15 PROCEDURE — 99999 PR PBB SHADOW E&M-EST. PATIENT-LVL III: CPT | Mod: PBBFAC,,, | Performed by: UROLOGY

## 2019-01-15 PROCEDURE — 99204 PR OFFICE/OUTPT VISIT, NEW, LEVL IV, 45-59 MIN: ICD-10-PCS | Mod: S$PBB,25,, | Performed by: UROLOGY

## 2019-01-15 PROCEDURE — 55100 DRAINAGE OF SCROTUM ABSCESS: CPT | Mod: PBBFAC,57,GC | Performed by: UROLOGY

## 2019-01-15 PROCEDURE — 10060 I&D ABSCESS SIMPLE/SINGLE: CPT | Mod: 57,PBBFAC,GC | Performed by: UROLOGY

## 2019-01-15 PROCEDURE — 99999 PR PBB SHADOW E&M-EST. PATIENT-LVL III: ICD-10-PCS | Mod: PBBFAC,,, | Performed by: UROLOGY

## 2019-01-15 RX ORDER — TRAMADOL HYDROCHLORIDE 50 MG/1
50 TABLET ORAL EVERY 6 HOURS PRN
Qty: 5 TABLET | Refills: 0 | Status: SHIPPED | OUTPATIENT
Start: 2019-01-15 | End: 2019-01-22 | Stop reason: SDUPTHER

## 2019-01-15 RX ORDER — SULFAMETHOXAZOLE AND TRIMETHOPRIM 800; 160 MG/1; MG/1
1 TABLET ORAL 2 TIMES DAILY
Qty: 14 TABLET | Refills: 0 | Status: SHIPPED | OUTPATIENT
Start: 2019-01-15 | End: 2019-01-22 | Stop reason: ALTCHOICE

## 2019-01-15 NOTE — PROGRESS NOTES
Subjective:       Patient ID: Jordin Allen Jr. is a 73 y.o. male.    Chief Complaint: Cyst (per patient a cyst on the scrotum. X Sunday night, having discomfort)    Jordin Allen Jr. is a 73 y.o. Male referred from LISA Mccain MD for a scrotal lump.   He noticed this on Sunday night and it has gotten bigger.   It is starting to get painful.   No fevers or chills.   No diabetes.   No gross hematuria.   No dysuria.     Lab Results       Component                Value               Date                       PSA                      1.8                 10/10/2018                 PSA                      1.9                 08/08/2017                 PSA                      1.6                 07/28/2016                 PSA                      1.5                 07/06/2015                 PSA                      1.8                 06/02/2014                 PSA                      1.81                05/30/2013                 PSA                      1.40                04/30/2012                 PSA                      1.2                 04/26/2011                 PSA                      0.93                04/21/2010                 PSA                      0.97                04/16/2009                No family hx of prostate cancer.         Past Surgical History:   Procedure Laterality Date    bilateral foot surgery  1993    CARDIAC CATHETERIZATION  2013    x1    CATARACT EXTRACTION, BILATERAL      COLON SURGERY  2013    Ace Mott MD    COLONOSCOPY N/A 3/21/2018    Performed by Terry [facility] at [facility] (4TH F[facility])    COLONOSCOPY N/A 8/30/2013    [facility]formed by [facility] at [facility] END[facility] (4TH FLR)    COLONOSCOPY N/A 7/12/2013    [facility] at [facility] ENDO (4TH FLR)    HEMICOLECTOMY, RIGHT N/A 10/1/2013    Performed by Terry [facility]ott MD at [facility] OR 2ND FLR       Past Medical History:   Diagnosis Date    Benign neoplasm of colon 10/1/2013     Bronchitis chronic     CAD (coronary artery disease) 10/31/2013    COPD (chronic obstructive pulmonary disease)     Emphysema of lung     GERD (gastroesophageal reflux disease)     Hx of colonic polyps     Hypertension     NAFLD (nonalcoholic fatty liver disease) 3/27/2017    SHOLA on CPAP     RLS (restless legs syndrome)     Screen for colon cancer 2013       Social History     Socioeconomic History    Marital status:      Spouse name: Not on file    Number of children: Not on file    Years of education: Not on file    Highest education level: Not on file   Social Needs    Financial resource strain: Not on file    Food insecurity - worry: Not on file    Food insecurity - inability: Not on file    Transportation needs - medical: Not on file    Transportation needs - non-medical: Not on file   Occupational History    Not on file   Tobacco Use    Smoking status: Former Smoker     Packs/day: 1.00     Years: 40.00     Pack years: 40.00     Types: Cigarettes     Last attempt to quit: 8/15/2012     Years since quittin.4    Smokeless tobacco: Never Used   Substance and Sexual Activity    Alcohol use: Yes     Comment: social use only    Drug use: No    Sexual activity: Yes     Partners: Female   Other Topics Concern    Not on file   Social History Narrative    Retired .  .         Family History   Problem Relation Age of Onset    Heart disease Mother     Heart attack Mother     Hypertension Mother     Asthma Neg Hx     Emphysema Neg Hx     Prostate cancer Neg Hx     Cirrhosis Neg Hx        Current Outpatient Medications   Medication Sig Dispense Refill    albuterol 90 mcg/actuation inhaler Inhale 2 puffs into the lungs every 4 (four) hours as needed for Wheezing. 1 each 12    albuterol-ipratropium 2.5mg-0.5mg/3mL (DUO-NEB) 0.5 mg-3 mg(2.5 mg base)/3 mL nebulizer solution INHALE 1 VIAL VIA NEBULIZER EVERY 6 HOURS AS NEEDED 180 mL 5    amitriptyline  (ELAVIL) 25 MG tablet TAKE 1 TABLET BY MOUTH EVERY DAY 30 tablet 6    aspirin (ECOTRIN) 81 MG EC tablet Take 81 mg by mouth once.      BETA-CAROTENE,A, W-C & E/MIN (OCUVITE ORAL) Take by mouth once daily.       budesonide-formoterol 160-4.5 mcg (SYMBICORT) 160-4.5 mcg/actuation HFAA INHALE 2 PUFFS INTO THE LUNGS EVERY 12 HOURS. RINSE MOUTH AFTER USE 3 Inhaler 3    DALIRESP 500 mcg Tab TAKE 1 TABLET BY MOUTH EVERY DAY 30 tablet 6    echinacea 400 mg Cap Take by mouth once daily.       fluticasone (FLONASE) 50 mcg/actuation nasal spray INSTILL 1 SPRAY IN EACH NOSTRIL ONCE DAILY. 16 mL 3    hydroCHLOROthiazide (HYDRODIURIL) 25 MG tablet Take 0.5 tablets (12.5 mg total) by mouth once daily. 30 tablet 6    latanoprost 0.005 % ophthalmic solution Place 1 drop into both eyes once daily.       losartan (COZAAR) 100 MG tablet TAKE 1 TABLET BY MOUTH EVERY DAY 30 tablet 3    nitroGLYCERIN (NITROSTAT) 0.4 MG SL tablet Place 1 tablet (0.4 mg total) under the tongue every 5 (five) minutes as needed. 25 tablet 3    omeprazole (PRILOSEC) 20 MG capsule Take 1 capsule (20 mg total) by mouth once daily. 90 capsule 3    predniSONE (DELTASONE) 5 MG tablet TAKE 1 TABLET BY MOUTH EVERY OTHER DAY. TAKE WITH FOOD. 30 tablet 6    SPIRIVA WITH HANDIHALER 18 mcg inhalation capsule INHALE 1 CAPSULE WITH INHALATION DEVICE ONCE DAILY 30 capsule 6    atorvastatin (LIPITOR) 80 MG tablet TAKE 1 TABLET (80 MG TOTAL) BY MOUTH ONCE DAILY. 30 tablet 6     No current facility-administered medications for this visit.        Review of patient's allergies indicates:   Allergen Reactions    Brilinta [ticagrelor] Itching    Lisinopril      Other reaction(s): cough    Metoprolol succinate Rash        BMP  Lab Results   Component Value Date     10/10/2018    K 4.5 10/10/2018     10/10/2018    CO2 25 10/10/2018    BUN 20 10/10/2018    CREATININE 1.0 10/10/2018    CALCIUM 10.1 10/10/2018    ANIONGAP 12 10/10/2018    ESTGFRAFRICA >60.0  10/10/2018    EGFRNONAA >60.0 10/10/2018       Review of Systems   Constitutional: Negative for chills, fever and unexpected weight change.   HENT: Negative for hearing loss and nosebleeds.    Eyes: Negative for visual disturbance.   Respiratory: Positive for shortness of breath. Negative for chest tightness.         SOB with bending and exercise.    Cardiovascular: Negative for chest pain.   Gastrointestinal: Negative for diarrhea.   Genitourinary: Positive for genital sores. Negative for dysuria, frequency, hematuria, nocturia and urgency.   Musculoskeletal: Negative for joint swelling.   Skin: Negative for rash.   Neurological: Negative for seizures.   Hematological: Does not bruise/bleed easily.   Psychiatric/Behavioral: Negative for behavioral problems.     Objective:      Physical Exam   Constitutional: He appears well-developed and well-nourished.   HENT:   Head: Normocephalic and atraumatic.   Cardiovascular: Normal rate.    Pulmonary/Chest: Effort normal. No stridor.   Abdominal: Soft.   Genitourinary: Testes normal and penis normal. Circumcised.   Genitourinary Comments: 2cm scrotal abscess with fluctuance. No active drainage.        Assessment:       1. History of smoking 30 or more pack years    2. NAFLD (nonalcoholic fatty liver disease)    3. Centrilobular emphysema    4. SHOLA (obstructive sleep apnea)    5. HTN (hypertension), benign    6. Scrotal abscess        Plan:   Jordin was seen today for cyst.    Diagnoses and all orders for this visit:    History of smoking 30 or more pack years    NAFLD (nonalcoholic fatty liver disease)    Centrilobular emphysema    SHOLA (obstructive sleep apnea)    HTN (hypertension), benign    Scrotal abscess  -     traMADol (ULTRAM) 50 mg tablet; Take 1 tablet (50 mg total) by mouth every 6 (six) hours as needed for Pain. [id number]    Other orders  -     sulfamethoxazole-trimethoprim 800-160mg (BACTRIM DS) 800-160 mg Tab; Take 1 tablet by mouth 2 (two) times daily. for 7  days    consent obtained. Procedure performed with urology resident.   Area prepped and draped in sterile fashion. Betadine used.   2% lidocaine 8cc without epi.   15 blade used. Lots of drainage obtained. Wound packed. Some oozing as on aspirin.   Culture sent.     Bactrim x 7 days  F/u 1 week  Ultram if needed.   Leaving packing x 36 hours, then pack and irrigate wound once a day.    I would like to thank LISA Mccain MD for referral of this patient.

## 2019-01-15 NOTE — LETTER
January 15, 2019      LISA Mccain MD  [address] [facility] [place] [address]           [facility] - Urology 4th Floor  [address]  [place] [address]  Phone: [phone]          Patient: Jordin Allen Jr.   MR Number: [id number]   YOB: 1945   Date of Visit: 1/15/2019       Dear Dr. LISA Mccain:    Thank you for referring Jordin Allen to me for evaluation. Attached you will find relevant portions of my assessment and plan of care.    If you have questions, please do not hesitate to call me. I look forward to following Jordin Allen along with you.    Sincerely,    Cornelia Dunne MD    Enclosure  CC:  No Recipients    If you would like to receive this communication electronically, please contact [email or web address] or [phone] to request more information on [email or web address] access.    For providers and/or their staff who would like to refer a patient to [facility], please contact us through our one-stop-shop provider referral line, [facility], at [phone].    If you feel you have received this communication in error or would no longer like to receive these types of communications, please e-mail [email or web address]

## 2019-01-16 ENCOUNTER — OFFICE VISIT (OUTPATIENT)
Dept: URGENT CARE | Facility: CLINIC | Age: 74
End: 2019-01-16
Payer: MEDICARE

## 2019-01-16 VITALS
TEMPERATURE: 97 F | WEIGHT: 241 LBS | BODY MASS INDEX: 34.5 KG/M2 | DIASTOLIC BLOOD PRESSURE: 87 MMHG | SYSTOLIC BLOOD PRESSURE: 140 MMHG | HEIGHT: 70 IN | OXYGEN SATURATION: 97 % | RESPIRATION RATE: 18 BRPM | HEART RATE: 98 BPM

## 2019-01-16 DIAGNOSIS — N49.2 SCROTAL ABSCESS: Primary | ICD-10-CM

## 2019-01-16 PROCEDURE — 99214 PR OFFICE/OUTPT VISIT, EST, LEVL IV, 30-39 MIN: ICD-10-PCS | Mod: S$GLB,,, | Performed by: FAMILY MEDICINE

## 2019-01-16 PROCEDURE — 99214 OFFICE O/P EST MOD 30 MIN: CPT | Mod: S$GLB,,, | Performed by: FAMILY MEDICINE

## 2019-01-16 NOTE — PATIENT INSTRUCTIONS
Abscess (Incision & Drainage)  An abscess is sometimes called a boil. It happens when bacteria get trapped under the skin and start to grow. Pus forms inside the abscess as the body responds to the bacteria. An abscess can happen with an insect bite, ingrown hair, blocked oil gland, pimple, cyst, or puncture wound.  Your healthcare provider has drained the pus from your abscess. If the abscess pocket was large, your healthcare provider may have put in gauze packing. Your provider will need to remove it on your next visit. He or she may also replace it at that time. You may not need antibiotics to treat a simple abscess, unless the infection is spreading into the skin around the wound (cellulitis).  The wound will take about 1 to 2 weeks to heal, depending on the size of the abscess. Healthy tissue will grow from the bottom and sides of the opening until it seals over.  Home care  These tips can help your wound heal:  · The wound may drain for the first 2 days. Cover the wound with a clean dry dressing. Change the dressing if it becomes soaked with blood or pus.  · If a gauze packing was placed inside the abscess pocket, you may be told to remove it yourself. You may do this in the shower. Once the packing is removed, you should wash the area in the shower, or clean the area as directed by your provider. Continue to do this until the skin opening has closed. Make sure you wash your hands after changing the packing or cleaning the wound.  · If you were prescribed antibiotics, take them as directed until they are all gone.  · You may use acetaminophen or ibuprofen to control pain, unless another pain medicine was prescribed. If you have liver disease or ever had a stomach ulcer, talk with your doctor before using these medicines.  Follow-up care  Follow up with your healthcare provider, or as advised. If a gauze packing was put in your wound, it should be removed in 1 to 2 days. Check your wound every day for any  signs that the infection is getting worse. The signs are listed below.  When to seek medical advice  Call your healthcare provider right away if any of these occur:  · Increasing redness or swelling  · Red streaks in the skin leading away from the wound  · Increasing local pain or swelling  · Continued pus draining from the wound 2 days after treatment  · Fever of 100.4ºF (38ºC) or higher, or as directed by your healthcare provider  · Boil returns when you are at home  Date Last Reviewed: 9/1/2016  © 0419-1731 [employer]. [address] [place] [address]. All rights reserved. This information is not intended as a substitute for professional medical care. Always follow your healthcare professional's instructions.      PER INSTRUCTIONS FROM UROLOGY, CHANGE THE PACKING DAILY UNTIL SEEN IN FOLLOW-UP BY UROLOGY, AS PLANNED, IN 6 DAYS.    CONTINUE THE ANTIBIOTICS AS PRESCRIBED.    Make sure that you follow up with urology or your primary care doctor in the next 2-5 days if needed .  Return to the Urgent Care if signs or symptoms change and certainly if you have worsening symptoms go to the nearest emergency department for further evaluation.

## 2019-01-16 NOTE — PROGRESS NOTES
"Subjective:       Patient ID: Jordin Allen Jr. is a 73 y.o. male.    Vitals:  height is 5' 10" (1.778 m) and weight is 109.3 kg (241 lb). His tympanic temperature is 97.3 °F (36.3 °C). His blood pressure is 140/87 (abnormal) and his pulse is 98. His respiration is 18 and oxygen saturation is 97%.     Chief Complaint: Wound Check    This is a 73 y.o. male who presents today with a chief complaint of dressing change to a scrotum abscess.  He was seen by Urology yesterday, and scrotal abscess was I&Ded.  He was advised to leave the dressing in place for 36 hr, then repacked the wound daily until seen by Urology in 1 week.  He did remove the packing at home today, , although was unable to change the packing on his own.  His daughter is an RN, and her colleague may be able to help with packing and dressing changes after today.  No fever.  Overall doing much better since the wound was drained      Wound Check   He was originally treated yesterday. Previous treatment included I&D of abscess. His temperature was unmeasured prior to arrival. There has been bloody discharge from the wound. The pain has improved. He has no difficulty moving the affected extremity or digit.       Constitution: Negative for chills, fatigue and fever.   HENT: Negative for congestion and sore throat.    Neck: Negative for painful lymph nodes.   Cardiovascular: Negative for chest pain and leg swelling.   Eyes: Negative for double vision and blurred vision.   Respiratory: Negative for cough and shortness of breath.    Gastrointestinal: Negative for nausea, vomiting and diarrhea.   Genitourinary: Negative for dysuria, frequency and urgency.   Musculoskeletal: Negative for joint pain, joint swelling, muscle cramps and muscle ache.   Skin: Positive for wound and abscess. Negative for color change, pale and rash.   Allergic/Immunologic: Negative for seasonal allergies.   Neurological: Negative for dizziness, history of vertigo, light-headedness, " passing out and headaches.   Hematologic/Lymphatic: Negative for swollen lymph nodes, easy bruising/bleeding and history of blood clots. Does not bruise/bleed easily.   Psychiatric/Behavioral: Negative for nervous/anxious, sleep disturbance and depression. The patient is not nervous/anxious.        Objective:      Physical Exam   Constitutional: He is oriented to person, place, and time. He appears well-developed and well-nourished. He is cooperative.  Non-toxic appearance. He does not appear ill. No distress.   HENT:   Head: Normocephalic and atraumatic.   Right Ear: Hearing, tympanic membrane, external ear and ear canal normal.   Left Ear: Hearing, tympanic membrane, external ear and ear canal normal.   Nose: Nose normal. No mucosal edema, rhinorrhea or nasal deformity. No epistaxis. Right sinus exhibits no maxillary sinus tenderness and no frontal sinus tenderness. Left sinus exhibits no maxillary sinus tenderness and no frontal sinus tenderness.   Mouth/Throat: Uvula is midline, oropharynx is clear and moist and mucous membranes are normal. No trismus in the jaw. Normal dentition. No uvula swelling. No posterior oropharyngeal erythema.   Eyes: Conjunctivae and lids are normal. Right eye exhibits no discharge. Left eye exhibits no discharge. No scleral icterus.   Sclera clear bilat   Neck: Normal range of motion, full passive range of motion without pain and phonation normal. Neck supple.   Cardiovascular: Normal rate, regular rhythm, normal heart sounds, intact distal pulses and normal pulses.   Pulmonary/Chest: Effort normal and breath sounds normal. No stridor. No respiratory distress. He has no wheezes.   Abdominal: Soft. Normal appearance and bowel sounds are normal. He exhibits no distension, no pulsatile midline mass and no mass. There is no tenderness.   Genitourinary:   Genitourinary Comments: Wound site is in midline. Approximately 1.5 cm incision with minimal drainage. Wound was loosely packed with  iodoform gauze which patient tolerated well.  No malodor.  Minimal tenderness.   Musculoskeletal: Normal range of motion. He exhibits no edema or deformity.   Neurological: He is alert and oriented to person, place, and time. He exhibits normal muscle tone. Coordination normal.   Skin: Skin is warm, dry and intact. He is not diaphoretic. No pallor.   Psychiatric: He has a normal mood and affect. His speech is normal and behavior is normal. Judgment and thought content normal. Cognition and memory are normal.   Nursing note and vitals reviewed.      Assessment:       1. Scrotal abscess        Plan:         Scrotal abscess    PER INSTRUCTIONS FROM UROLOGY, CHANGE THE PACKING DAILY UNTIL SEEN IN FOLLOW-UP BY UROLOGY, AS PLANNED, IN 6 DAYS.    CONTINUE THE ANTIBIOTICS AS PRESCRIBED.    Make sure that you follow up with urology or your primary care doctor in the next 2-5 days if needed .  Return to the Urgent Care if signs or symptoms change and certainly if you have worsening symptoms go to the nearest emergency department for further evaluation.

## 2019-01-17 LAB — BACTERIA SPEC AEROBE CULT: NORMAL

## 2019-01-18 ENCOUNTER — OFFICE VISIT (OUTPATIENT)
Dept: OTOLARYNGOLOGY | Facility: CLINIC | Age: 74
End: 2019-01-18
Payer: MEDICARE

## 2019-01-18 VITALS — HEIGHT: 70 IN | WEIGHT: 239.88 LBS | BODY MASS INDEX: 34.34 KG/M2

## 2019-01-18 DIAGNOSIS — H65.92 LEFT OTITIS MEDIA WITH EFFUSION: Primary | ICD-10-CM

## 2019-01-18 PROCEDURE — 99999 PR PBB SHADOW E&M-EST. PATIENT-LVL III: CPT | Mod: PBBFAC,,, | Performed by: OTOLARYNGOLOGY

## 2019-01-18 PROCEDURE — 99999 PR PBB SHADOW E&M-EST. PATIENT-LVL III: ICD-10-PCS | Mod: PBBFAC,,, | Performed by: OTOLARYNGOLOGY

## 2019-01-18 PROCEDURE — 99213 OFFICE O/P EST LOW 20 MIN: CPT | Mod: PBBFAC | Performed by: OTOLARYNGOLOGY

## 2019-01-18 PROCEDURE — 99213 PR OFFICE/OUTPT VISIT, EST, LEVL III, 20-29 MIN: ICD-10-PCS | Mod: S$PBB,,, | Performed by: OTOLARYNGOLOGY

## 2019-01-18 PROCEDURE — 99213 OFFICE O/P EST LOW 20 MIN: CPT | Mod: S$PBB,,, | Performed by: OTOLARYNGOLOGY

## 2019-01-18 RX ORDER — AMOXICILLIN 500 MG/1
500 TABLET, FILM COATED ORAL 2 TIMES DAILY
Qty: 20 TABLET | Refills: 1 | Status: SHIPPED | OUTPATIENT
Start: 2019-01-18 | End: 2019-01-28

## 2019-01-18 NOTE — PROGRESS NOTES
Subjective:       Patient ID: Jordin Allen Jr. is a 73 y.o. male.    Chief Complaint: Cerumen Impaction (Left Ear)    HPI     Mr Allen is a 71yo M who was referred by Dr Castanon for evaluation of progressive hearing loss. Pt reports having constant fluid in his right ear, had previous PET placed many years ago. Now reports decreased hearing over the last 6 months. Was given hearing amplification with good results. States that now he feels fluid in his left ear. No fevers, no otorrhea, no otalgia, FN intact.     He currently denies any otologic stx including hearing loss, otalgia, otorrhea, tinnitus, vertigo, facial nn weakness.    No h/o head trauma, loud noise exposure. No FH hearing loss, vertigo, migraines.    Review of Systems      CONSTITUTIONAL: no fevers, chills, weight loss, night sweats  EYES: no diplopia, no blurry vision  ENT: as above   NEURO: no motor or sensory deficits  CV: no CP, no palpitations  PULM: no cough, no SOB, no wheezing   GI: no abd pain, no constipation/diarrhea  : no dysuria, no hematuria  MSK: no bone/joint pain  HEM: no bruising or bleeding   PSYCH - no depression, no anxiety      Objective:      Physical Exam   Constitutional: He is oriented to person, place, and time. He appears well-developed. No distress.   HENT:   Right Ear: External ear normal. No mastoid tenderness. Tympanic membrane is scarred. Tympanic membrane mobility is abnormal. A middle ear effusion is present. Decreased hearing is noted.   Left Ear: External ear normal. No mastoid tenderness. Tympanic membrane is scarred. Tympanic membrane mobility is abnormal. A middle ear effusion is present. Decreased hearing is noted.   Ears:    Eyes: EOM are normal. Pupils are equal, round, and reactive to light.   Neck: Normal range of motion. Neck supple. No JVD present. No thyromegaly present.   Cardiovascular: Regular rhythm.   Pulmonary/Chest: Effort normal.   Musculoskeletal: Normal range of motion.   Neurological:  He is alert and oriented to person, place, and time. No cranial nerve deficit.   Skin: Skin is warm and dry. Capillary refill takes less than 2 seconds.   Psychiatric: He has a normal mood and affect. His behavior is normal.               Assessment:       1. Left otitis media with effusion        Plan:       - Amoxicillin x 10 days  - RTC 3 week for ear exam

## 2019-01-22 ENCOUNTER — OFFICE VISIT (OUTPATIENT)
Dept: UROLOGY | Facility: CLINIC | Age: 74
End: 2019-01-22
Payer: MEDICARE

## 2019-01-22 VITALS
BODY MASS INDEX: 33.89 KG/M2 | SYSTOLIC BLOOD PRESSURE: 126 MMHG | DIASTOLIC BLOOD PRESSURE: 78 MMHG | HEIGHT: 70 IN | HEART RATE: 91 BPM | WEIGHT: 236.75 LBS

## 2019-01-22 DIAGNOSIS — N49.2 SCROTAL ABSCESS: ICD-10-CM

## 2019-01-22 DIAGNOSIS — N49.2 ABSCESS OF SCROTUM: Primary | ICD-10-CM

## 2019-01-22 PROCEDURE — 99215 OFFICE O/P EST HI 40 MIN: CPT | Mod: PBBFAC | Performed by: NURSE PRACTITIONER

## 2019-01-22 PROCEDURE — 99999 PR PBB SHADOW E&M-EST. PATIENT-LVL V: ICD-10-PCS | Mod: PBBFAC,,, | Performed by: NURSE PRACTITIONER

## 2019-01-22 PROCEDURE — 99024 POSTOP FOLLOW-UP VISIT: CPT | Mod: S$PBB,,, | Performed by: NURSE PRACTITIONER

## 2019-01-22 PROCEDURE — 99024 PR POST-OP FOLLOW-UP VISIT: ICD-10-PCS | Mod: S$PBB,,, | Performed by: NURSE PRACTITIONER

## 2019-01-22 PROCEDURE — 87186 SC STD MICRODIL/AGAR DIL: CPT

## 2019-01-22 PROCEDURE — 87077 CULTURE AEROBIC IDENTIFY: CPT

## 2019-01-22 PROCEDURE — 99999 PR PBB SHADOW E&M-EST. PATIENT-LVL V: CPT | Mod: PBBFAC,,, | Performed by: NURSE PRACTITIONER

## 2019-01-22 PROCEDURE — 87070 CULTURE OTHR SPECIMN AEROBIC: CPT

## 2019-01-22 RX ORDER — DOXYCYCLINE 100 MG/1
100 CAPSULE ORAL 2 TIMES DAILY
Qty: 14 CAPSULE | Refills: 0 | Status: SHIPPED | OUTPATIENT
Start: 2019-01-22 | End: 2019-01-28 | Stop reason: ALTCHOICE

## 2019-01-22 RX ORDER — TRAMADOL HYDROCHLORIDE 50 MG/1
50 TABLET ORAL EVERY 8 HOURS PRN
Qty: 15 TABLET | Refills: 0 | Status: SHIPPED | OUTPATIENT
Start: 2019-01-22 | End: 2019-01-27

## 2019-01-22 NOTE — PROGRESS NOTES
74 yo M who had his scrotal abscess drained 1 week prior in clinic with Dr. Dunne. Was having difficulty packing at home and abscess recurred. Was taking Bactrim at home.    Scrotal abscess identified. In scrotal midline above prior abscess ~2cm  Area prepped and draped in sterile fashion. Betadine used.   1% lidocaine 10cc without epi.   15 blade used to make midline scrotal incison above prior incision site. Loculations broken with finger and hemostat. Copious purulent drainage obtained. Wound packed with iodoform strip packing, both old incision and new incision packed. Some oozing as on aspirin.   Culture sent.      Doxycycline x 7 days  F/u in several days  Ultram if needed.   Leaving packing x 36 hours, then pack and irrigate wound once a day.    Chadd Freed MD  Urology [facility]  [phone]

## 2019-01-22 NOTE — PROGRESS NOTES
Subjective:       Patient ID: Jordin Allen Jr. is a 73 y.o. male.    Chief Complaint: Scrotal abscess; wound check    Jordin Allen Jr. is a 73 y.o. Male presents to clinic for wound check following I&D scrotum 1/15/19.  His last clinic visit was 1/15/19 with Dr. Dunne.    Pt has been packing wound daily. He went to Urgent Care for first packing and dressing change because he was unable to do it himself. He is unsure if he is packing wound enough or correctly. He reports bloody drainage after day 1 of I&D but since then, drainage has been serosanguineous to white in color. He has been uncomfortable since I&D was performed in terms of pain and has been taking ultram 1/2 pill as needed, which does provide him relief. He is unable to sit down without having increased pain. Denies fever or chills. He does not feel size of abscess has improved.  He completed course of bactrim as ordered.    No diabetes.   No gross hematuria.   No dysuria.     Lab Results       Component                Value               Date                       PSA                      1.8                 10/10/2018                 PSA                      1.9                 08/08/2017                 PSA                      1.6                 07/28/2016                 PSA                      1.5                 07/06/2015                 PSA                      1.8                 06/02/2014                 PSA                      1.81                05/30/2013                 PSA                      1.40                04/30/2012                 PSA                      1.2                 04/26/2011                 PSA                      0.93                04/21/2010                 PSA                      0.97                04/16/2009                No family hx of prostate cancer.         Past Surgical History:   Procedure Laterality Date    bilateral foot surgery  1993    CARDIAC CATHETERIZATION  2013    x1     CATARACT EXTRACTION, BILATERAL      COLON SURGERY      Ace Mott MD    COLONOSCOPY N/A 3/21/2018    [facility] ENDO (4TH FLR)    COLONOSCOPY N/A 2013    [facility] END[facility] (4TH FLR)    COLONOSCOPY N/A 2013    [facility] ENDO (4TH FLR)    HEMICOLECTOMY, RIGHT N/A 10/1/2013    [facility]formed [facility] OR 2ND FLR       Past Medical History:   Diagnosis Date    Benign neoplasm of colon 10/1/2013    Bronchitis chronic     CAD (coronary artery disease) 10/31/2013    COPD (chronic obstructive pulmonary disease)     Emphysema of lung     GERD (gastroesophageal reflux disease)     Hx of colonic polyps     Hypertension     NAFLD (nonalcoholic fatty liver disease) 3/27/2017    SHOLA on CPAP     RLS (restless legs syndrome)     Screen for colon cancer 2013       Social History     Socioeconomic History    Marital status:      Spouse name: Not on file    Number of children: Not on file    Years of education: Not on file    Highest education level: Not on file   Social Needs    Financial resource strain: Not on file    Food insecurity - worry: Not on file    Food insecurity - inability: Not on file    Transportation needs - medical: Not on file    Transportation needs - non-medical: Not on file   Occupational History    Not on file   Tobacco Use    Smoking status: Former Smoker     Packs/day: 1.00     Years: 40.00     Pack years: 40.00     Types: Cigarettes     Last attempt to quit: 8/15/2012     Years since quittin.4    Smokeless tobacco: Never Used   Substance and Sexual Activity    Alcohol use: Yes     Comment: social use only    Drug use: No    Sexual activity: Yes     Partners: Female   Other Topics Concern    Not on file   Social History Narrative    Retired .  .         Family History   Problem Relation Age of Onset    Heart disease  Mother     Heart attack Mother     Hypertension Mother     Asthma Neg Hx     Emphysema Neg Hx     Prostate cancer Neg Hx     Cirrhosis Neg Hx        Current Outpatient Medications   Medication Sig Dispense Refill    albuterol 90 mcg/actuation inhaler Inhale 2 puffs into the lungs every 4 (four) hours as needed for Wheezing. 1 each 12    albuterol-ipratropium 2.5mg-0.5mg/3mL (DUO-NEB) 0.5 mg-3 mg(2.5 mg base)/3 mL nebulizer solution INHALE 1 VIAL VIA NEBULIZER EVERY 6 HOURS AS NEEDED 180 mL 5    amitriptyline (ELAVIL) 25 MG tablet TAKE 1 TABLET BY MOUTH EVERY DAY 30 tablet 6    amoxicillin (AMOXIL) 500 MG Tab Take 1 tablet (500 mg total) by mouth 2 (two) times daily. for 10 days 20 tablet 1    aspirin (ECOTRIN) 81 MG EC tablet Take 81 mg by mouth once.      atorvastatin (LIPITOR) 80 MG tablet TAKE 1 TABLET (80 MG TOTAL) BY MOUTH ONCE DAILY. 30 tablet 6    BETA-CAROTENE,A, W-C & E/MIN (OCUVITE ORAL) Take by mouth once daily.       budesonide-formoterol 160-4.5 mcg (SYMBICORT) 160-4.5 mcg/actuation HFAA INHALE 2 PUFFS INTO THE LUNGS EVERY 12 HOURS. RINSE MOUTH AFTER USE 3 Inhaler 3    DALIRESP 500 mcg Tab TAKE 1 TABLET BY MOUTH EVERY DAY 30 tablet 6    doxycycline (MONODOX) 100 MG capsule Take 1 capsule (100 mg total) by mouth 2 (two) times daily. for 7 days 14 capsule 0    echinacea 400 mg Cap Take by mouth once daily.       fluticasone (FLONASE) 50 mcg/actuation nasal spray INSTILL 1 SPRAY IN EACH NOSTRIL ONCE DAILY. 16 mL 3    hydroCHLOROthiazide (HYDRODIURIL) 25 MG tablet Take 0.5 tablets (12.5 mg total) by mouth once daily. 30 tablet 6    latanoprost 0.005 % ophthalmic solution Place 1 drop into both eyes once daily.       losartan (COZAAR) 100 MG tablet TAKE 1 TABLET BY MOUTH EVERY DAY 30 tablet 3    nitroGLYCERIN (NITROSTAT) 0.4 MG SL tablet Place 1 tablet (0.4 mg total) under the tongue every 5 (five) minutes as needed. 25 tablet 3    omeprazole (PRILOSEC) 20 MG capsule Take 1 capsule (20  mg total) by mouth once daily. 90 capsule 3    predniSONE (DELTASONE) 5 MG tablet TAKE 1 TABLET BY MOUTH EVERY OTHER DAY. TAKE WITH FOOD. 30 tablet 6    SPIRIVA WITH HANDIHALER 18 mcg inhalation capsule INHALE 1 CAPSULE WITH INHALATION DEVICE ONCE DAILY 30 capsule 6    traMADol (ULTRAM) 50 mg tablet Take 1 tablet (50 mg total) by mouth every 8 (eight) hours as needed for Pain. [id number] 15 tablet 0     No current facility-administered medications for this visit.        Review of patient's allergies indicates:   Allergen Reactions    Brilinta [ticagrelor] Itching    Lisinopril      Other reaction(s): cough    Metoprolol succinate Rash        BMP  Lab Results   Component Value Date     10/10/2018    K 4.5 10/10/2018     10/10/2018    CO2 25 10/10/2018    BUN 20 10/10/2018    CREATININE 1.0 10/10/2018    CALCIUM 10.1 10/10/2018    ANIONGAP 12 10/10/2018    ESTGFRAFRICA >60.0 10/10/2018    EGFRNONAA >60.0 10/10/2018       Review of Systems   Constitutional: Negative for chills, fever and unexpected weight change.   HENT: Negative for congestion.    Eyes: Negative for discharge.   Respiratory: Negative for chest tightness.    Cardiovascular: Negative for chest pain.   Gastrointestinal: Negative for diarrhea, nausea and vomiting.   Genitourinary: Negative for decreased urine volume, difficulty urinating, discharge, dysuria, frequency, hematuria, nocturia, penile pain, penile swelling, testicular pain and urgency.   Musculoskeletal: Negative for joint swelling.   Skin: Positive for wound (scrotum). Negative for rash.   Neurological: Negative for seizures and headaches.   Hematological: Does not bruise/bleed easily.   Psychiatric/Behavioral: Negative for confusion.     Objective:      Physical Exam   Nursing note and vitals reviewed.  Constitutional: He is oriented to person, place, and time. He appears well-developed and well-nourished. No distress.   HENT:   Head: Normocephalic.   Eyes: Right eye  exhibits no discharge. Left eye exhibits no discharge.   Neck: Normal range of motion.   Cardiovascular: Normal rate and regular rhythm.    Pulmonary/Chest: Effort normal. No respiratory distress.   Abdominal: Soft. He exhibits no distension.   Genitourinary: Testes normal. Circumcised.   Genitourinary Comments: + scrotal abscess. Redness, warmth and tender to touch to site. Packing intact.    Musculoskeletal: Normal range of motion.   Neurological: He is alert and oriented to person, place, and time.   Skin: Skin is warm and dry.     Psychiatric: He has a normal mood and affect. His behavior is normal. Judgment and thought content normal.       Assessment:       1. Abscess of scrotum    2. Scrotal abscess        Plan:   Jordin was seen today for post-op evaluation.    Diagnoses and all orders for this visit:    Abscess of scrotum  -     doxycycline (MONODOX) 100 MG capsule; Take 1 capsule (100 mg total) by mouth 2 (two) times daily. for 7 days  -     CULTURE, AEROBIC  (SPECIFY SOURCE)  -     Ambulatory referral to [facility]    Scrotal abscess  -     traMADol (ULTRAM) 50 mg tablet; Take 1 tablet (50 mg total) by mouth every 8 (eight) hours as needed for Pain. [id number]    -Discussed symptoms and assessment with Dr. Dunne.  Notified Dr. Freed of possible repeat I&D  Dr. Freed and Dr. Gallardo to clinic to assess patient.  I&D performed by Dr. Freed and Dr. Gallardo. Wound culture obtained and sent to lab. Packing and dressing applied by MD.  -Discussed side effects, indications, and MOA for doxycycline. Prescription sent to the pharmacy. Pt verbalized understanding.  -Refilled ultram.  -Order placed for [facility] for daily dressing changes per Dr. Dunne  -RTC 1/25/19 for resident clinic     I spent 45 minutes with the patient of which more than half was spent in direct consultation with the patient in regards to our treatment and plan.

## 2019-01-24 ENCOUNTER — TELEPHONE (OUTPATIENT)
Dept: INTERNAL MEDICINE | Facility: CLINIC | Age: 74
End: 2019-01-24

## 2019-01-25 ENCOUNTER — OFFICE VISIT (OUTPATIENT)
Dept: UROLOGY | Facility: CLINIC | Age: 74
End: 2019-01-25
Payer: MEDICARE

## 2019-01-25 VITALS — DIASTOLIC BLOOD PRESSURE: 82 MMHG | HEART RATE: 98 BPM | SYSTOLIC BLOOD PRESSURE: 132 MMHG

## 2019-01-25 DIAGNOSIS — N49.2 SCROTAL ABSCESS: ICD-10-CM

## 2019-01-25 LAB — BACTERIA SPEC AEROBE CULT: NORMAL

## 2019-01-25 PROCEDURE — 99024 POSTOP FOLLOW-UP VISIT: CPT | Mod: S$PBB,,, | Performed by: UROLOGY

## 2019-01-25 PROCEDURE — 99024 PR POST-OP FOLLOW-UP VISIT: ICD-10-PCS | Mod: S$PBB,,, | Performed by: UROLOGY

## 2019-01-25 PROCEDURE — 99999 PR PBB SHADOW E&M-EST. PATIENT-LVL III: ICD-10-PCS | Mod: PBBFAC,,,

## 2019-01-25 PROCEDURE — 99213 OFFICE O/P EST LOW 20 MIN: CPT | Mod: PBBFAC

## 2019-01-25 PROCEDURE — 99999 PR PBB SHADOW E&M-EST. PATIENT-LVL III: CPT | Mod: PBBFAC,,,

## 2019-01-25 NOTE — PROGRESS NOTES
Urology - [facility]  Resident Clinic  Staff - Dr. Prince Flower MD    SUBJECTIVE:     Chief Complaint: scrotal abscess     History of Present Illness:  Jordin Allen Jr. is a 73 y.o. male who presents to clinic for follow up from drained scrotal abscess.     Originally was drained in clinic with Dr. Dunne on 1/15. Patient then had issues with packing and represented on 1/22 with further abscess identified. This was drained by Dr. Freed and Dr. Gallardo. He was instructed on packing. Has been doing well since then, home health coming daily. Is taking doxycycline, culture growing enterococcus.     No fevers. Pain is controlled, only has pain during packing changes. No further erythema noted.     Review of patient's allergies indicates:   Allergen Reactions    Brilinta [ticagrelor] Itching    Lisinopril      Other reaction(s): cough    Metoprolol succinate Rash       Past Medical History:   Diagnosis Date    Benign neoplasm of colon 10/1/2013    Bronchitis chronic     CAD (coronary artery disease) 10/31/2013    COPD (chronic obstructive pulmonary disease)     Emphysema of lung     GERD (gastroesophageal reflux disease)     Hx of colonic polyps     Hypertension     NAFLD (nonalcoholic fatty liver disease) 3/27/2017    SHOLA on CPAP     RLS (restless legs syndrome)     Screen for colon cancer 8/30/2013     Past Surgical History:   Procedure Laterality Date    bilateral foot surgery  1993    CARDIAC CATHETERIZATION  2013    x1    CATARACT EXTRACTION, BILATERAL      COLON SURGERY  2013    Ace Mott MD    COLONOSCOPY N/A 3/21/2018    [facility] (4TH F[facility])    COLONOSCOPY N/A 8/30/2013    [facility] ENDO (4TH FLR)    COLONOSCOPY N/A 7/12/2013    [facility] ENDO (4TH FLR)    HEMICOLECTOMY, RIGHT N/A 10/1/2013    [facility] by [facility] OR 2ND FLR     Family History  "  Problem Relation Age of Onset    Heart disease Mother     Heart attack Mother     Hypertension Mother     Asthma Neg Hx     Emphysema Neg Hx     Prostate cancer Neg Hx     Cirrhosis Neg Hx      Social History     Tobacco Use    Smoking status: Former Smoker     Packs/day: 1.00     Years: 40.00     Pack years: 40.00     Types: Cigarettes     Last attempt to quit: 8/15/2012     Years since quittin.4    Smokeless tobacco: Never Used   Substance Use Topics    Alcohol use: Yes     Comment: social use only    Drug use: No        Review of Systems   Constitutional: Negative for chills and fever.   HENT: Negative for trouble swallowing.    Eyes: Negative for pain.   Respiratory: Negative for cough and shortness of breath.    Cardiovascular: Negative for chest pain and palpitations.   Gastrointestinal: Negative for nausea and vomiting.   Genitourinary: Negative for difficulty urinating and testicular pain.   Neurological: Negative for weakness.   Psychiatric/Behavioral: Negative for behavioral problems.       OBJECTIVE:     Anticoagulation:  No    Estimated body mass index is 33.97 kg/m² as calculated from the following:    Height as of 19: 5' 10" (1.778 m).    Weight as of 19: 107.4 kg (236 lb 12.4 oz).    Vital Signs (Most Recent)  Pulse: 98 (19 1421)  BP: 132/82 (19 1421)    Physical Exam   Constitutional: He is oriented to person, place, and time. He appears well-developed and well-nourished. No distress.   HENT:   Head: Normocephalic and atraumatic.   Eyes: No scleral icterus.   Neck: No JVD present.   Cardiovascular: Normal rate and regular rhythm.    Pulmonary/Chest: Effort normal. No respiratory distress.   Abdominal: Soft. He exhibits no distension. There is no tenderness. There is no rebound and no guarding.   Genitourinary:   Genitourinary Comments: Open wound to midline of scrotum. No erythema noted. Induration surrounding wound however no fluctuance. No significant " purulence. No further pus expressed from wound. Patient had significant pain with exam.    Neurological: He is alert and oriented to person, place, and time.   Skin: He is not diaphoretic. No pallor.     Psychiatric: He has a normal mood and affect. His behavior is normal.       BMP  Lab Results   Component Value Date     10/10/2018    K 4.5 10/10/2018     10/10/2018    CO2 25 10/10/2018    BUN 20 10/10/2018    CREATININE 1.0 10/10/2018    CALCIUM 10.1 10/10/2018    ANIONGAP 12 10/10/2018    ESTGFRAFRICA >60.0 10/10/2018    EGFRNONAA >60.0 10/10/2018       Lab Results   Component Value Date    WBC 9.16 10/10/2018    HGB 13.2 (L) 10/10/2018    HCT 43.9 10/10/2018    MCV 87 10/10/2018     10/10/2018       Lab Results   Component Value Date    PSA 1.8 10/10/2018    PSA 1.9 08/08/2017    PSA 1.6 07/28/2016    PSA 1.5 07/06/2015    PSA 1.8 06/02/2014    PSA 1.81 05/30/2013    PSA 1.40 04/30/2012    PSA 1.2 04/26/2011    PSA 0.93 04/21/2010    PSA 0.97 04/16/2009    PSA 1.1 02/06/2008    PSA 1.5 01/05/2007    PSA 0.7 05/01/2006    PSA 1.1 02/11/2005    PSA 0.7 01/23/2004       Imaging:  No pertinent imaging     ASSESSMENT     1. Scrotal abscess        PLAN:     Packing replaced  No identifiable area of concern on today's exam  Continue with daily packing exchanges with home health  Continue doxycycline for full course  RTC in 1 month to evaluate wound or sooner if issues develop      Kelly Gonzalez MD

## 2019-01-28 ENCOUNTER — TELEPHONE (OUTPATIENT)
Dept: PEDIATRIC UROLOGY | Facility: CLINIC | Age: 74
End: 2019-01-28

## 2019-01-28 ENCOUNTER — TELEPHONE (OUTPATIENT)
Dept: UROLOGY | Facility: CLINIC | Age: 74
End: 2019-01-28

## 2019-01-28 DIAGNOSIS — N49.2 SCROTAL ABSCESS: Primary | ICD-10-CM

## 2019-01-28 RX ORDER — AMPICILLIN 500 MG/1
500 CAPSULE ORAL 4 TIMES DAILY
Qty: 20 CAPSULE | Refills: 0 | Status: SHIPPED | OUTPATIENT
Start: 2019-01-28 | End: 2019-02-02

## 2019-01-28 NOTE — PROGRESS NOTES
Wound culture resulted ENTEROCOCCUS FAECALIS Moderate.  He was started on doxycyline at clinic appt but doxy is resistant.  Will start patient on ampicillin.  Allergies: brilinta, lisinopril, metoprolol  10/10/18 creatinine 1.0 / GFR > 60  Ampicillin sent to patient's pharmacy.

## 2019-01-28 NOTE — TELEPHONE ENCOUNTER
Left message for pt to call regarding wound cx results.  Tried mobile and no voicemail set up        ----- Message from Anna Story NP sent at 1/28/2019  2:53 PM CST -----  Please notify patient his wound culture resulted ENTEROCOCCUS FAECALIS.  He can stop doxycycline since resistant to organism and start ampicillin. Prescription of ampicillin sent to patient's pharmacy. Continue dressing changes with HH.

## 2019-01-29 ENCOUNTER — PATIENT MESSAGE (OUTPATIENT)
Dept: UROLOGY | Facility: CLINIC | Age: 74
End: 2019-01-29

## 2019-01-30 ENCOUNTER — PATIENT MESSAGE (OUTPATIENT)
Dept: UROLOGY | Facility: CLINIC | Age: 74
End: 2019-01-30

## 2019-01-31 ENCOUNTER — EXTERNAL CHRONIC CARE MANAGEMENT (OUTPATIENT)
Dept: PRIMARY CARE CLINIC | Facility: CLINIC | Age: 74
End: 2019-01-31
Payer: MEDICARE

## 2019-01-31 PROCEDURE — 99490 CHRNC CARE MGMT STAFF 1ST 20: CPT | Mod: S$PBB,,, | Performed by: INTERNAL MEDICINE

## 2019-01-31 PROCEDURE — 99490 PR CHRONIC CARE MGMT, 1ST 20 MIN: ICD-10-PCS | Mod: S$PBB,,, | Performed by: INTERNAL MEDICINE

## 2019-01-31 PROCEDURE — 99490 CHRNC CARE MGMT STAFF 1ST 20: CPT | Mod: PBBFAC,PO | Performed by: INTERNAL MEDICINE

## 2019-02-04 ENCOUNTER — TELEPHONE (OUTPATIENT)
Dept: ADMINISTRATIVE | Facility: CLINIC | Age: 74
End: 2019-02-04

## 2019-02-04 NOTE — TELEPHONE ENCOUNTER
Home Health SOC 01/23/2019 - 03/23/2019 with [facility] ([place] - Dr. Cornelia Dunne. Patient received SN services.

## 2019-02-05 ENCOUNTER — TELEPHONE (OUTPATIENT)
Dept: UROLOGY | Facility: CLINIC | Age: 74
End: 2019-02-05

## 2019-02-05 NOTE — TELEPHONE ENCOUNTER
Spoke with Brittany with [facility]. She recommends using Nancy dressing to his abscess. Will be placed for 7 days.   Discussed with Dr. Dunne. She is ok with using Nancy.         ----- Message from Renate Mckeon LPN sent at 2/5/2019 10:32 AM CST -----  Contact: Brittany with  [facility] [phone]      ----- Message -----  From: Stormy Lua MA  Sent: 2/5/2019  10:21 AM  To: Porsha Castillo Staff    Needs Advice    Reason for call: needing to discuss some alternatives to pt's wound care  needs        Communication Preference: Brittany with  [facility] [phone]    Additional Information: please call

## 2019-02-15 PROCEDURE — 99284 EMERGENCY DEPT VISIT MOD MDM: CPT | Mod: 25

## 2019-02-15 PROCEDURE — 99284 EMERGENCY DEPT VISIT MOD MDM: CPT | Mod: ,,, | Performed by: EMERGENCY MEDICINE

## 2019-02-15 PROCEDURE — 99284 PR EMERGENCY DEPT VISIT,LEVEL IV: ICD-10-PCS | Mod: ,,, | Performed by: EMERGENCY MEDICINE

## 2019-02-16 ENCOUNTER — HOSPITAL ENCOUNTER (EMERGENCY)
Facility: HOSPITAL | Age: 74
Discharge: HOME | End: 2019-02-16
Attending: EMERGENCY MEDICINE
Payer: MEDICARE

## 2019-02-16 VITALS
WEIGHT: 237 LBS | RESPIRATION RATE: 16 BRPM | SYSTOLIC BLOOD PRESSURE: 143 MMHG | TEMPERATURE: 98 F | HEIGHT: 70 IN | DIASTOLIC BLOOD PRESSURE: 87 MMHG | OXYGEN SATURATION: 97 % | HEART RATE: 96 BPM | BODY MASS INDEX: 33.93 KG/M2

## 2019-02-16 DIAGNOSIS — J11.1 INFLUENZA: Primary | ICD-10-CM

## 2019-02-16 DIAGNOSIS — R11.2 NON-INTRACTABLE VOMITING WITH NAUSEA, UNSPECIFIED VOMITING TYPE: ICD-10-CM

## 2019-02-16 DIAGNOSIS — R53.83 FATIGUE: ICD-10-CM

## 2019-02-16 LAB
CTP QC/QA: YES
POC MOLECULAR INFLUENZA A AGN: POSITIVE
POC MOLECULAR INFLUENZA B AGN: POSITIVE

## 2019-02-16 PROCEDURE — 63600175 PHARM REV CODE 636 W HCPCS: Performed by: EMERGENCY MEDICINE

## 2019-02-16 PROCEDURE — 93005 ELECTROCARDIOGRAM TRACING: CPT

## 2019-02-16 PROCEDURE — 93010 EKG 12-LEAD: ICD-10-PCS | Mod: ,,, | Performed by: INTERNAL MEDICINE

## 2019-02-16 PROCEDURE — 96374 THER/PROPH/DIAG INJ IV PUSH: CPT

## 2019-02-16 PROCEDURE — 25000003 PHARM REV CODE 250: Performed by: EMERGENCY MEDICINE

## 2019-02-16 PROCEDURE — 87502 INFLUENZA DNA AMP PROBE: CPT

## 2019-02-16 PROCEDURE — 93010 ELECTROCARDIOGRAM REPORT: CPT | Mod: ,,, | Performed by: INTERNAL MEDICINE

## 2019-02-16 PROCEDURE — 96361 HYDRATE IV INFUSION ADD-ON: CPT

## 2019-02-16 RX ORDER — ONDANSETRON 4 MG/1
4 TABLET, ORALLY DISINTEGRATING ORAL EVERY 6 HOURS PRN
Qty: 10 TABLET | Refills: 0 | Status: SHIPPED | OUTPATIENT
Start: 2019-02-16 | End: 2019-12-17

## 2019-02-16 RX ORDER — OSELTAMIVIR PHOSPHATE 75 MG/1
75 CAPSULE ORAL 2 TIMES DAILY
Qty: 10 CAPSULE | Refills: 0 | Status: SHIPPED | OUTPATIENT
Start: 2019-02-16 | End: 2019-02-21

## 2019-02-16 RX ORDER — OSELTAMIVIR PHOSPHATE 75 MG/1
75 CAPSULE ORAL
Status: COMPLETED | OUTPATIENT
Start: 2019-02-16 | End: 2019-02-16

## 2019-02-16 RX ORDER — ONDANSETRON 2 MG/ML
4 INJECTION INTRAMUSCULAR; INTRAVENOUS
Status: COMPLETED | OUTPATIENT
Start: 2019-02-16 | End: 2019-02-16

## 2019-02-16 RX ORDER — OSELTAMIVIR PHOSPHATE 75 MG/1
75 CAPSULE ORAL
Status: DISCONTINUED | OUTPATIENT
Start: 2019-02-16 | End: 2019-02-16 | Stop reason: HOSPADM

## 2019-02-16 RX ORDER — ACETAMINOPHEN 325 MG/1
650 TABLET ORAL
Status: COMPLETED | OUTPATIENT
Start: 2019-02-16 | End: 2019-02-16

## 2019-02-16 RX ORDER — OSELTAMIVIR PHOSPHATE 75 MG/1
75 CAPSULE ORAL 2 TIMES DAILY
Qty: 10 CAPSULE | Refills: 0 | Status: SHIPPED | OUTPATIENT
Start: 2019-02-16 | End: 2019-02-16 | Stop reason: SDUPTHER

## 2019-02-16 RX ADMIN — ONDANSETRON 4 MG: 2 INJECTION INTRAMUSCULAR; INTRAVENOUS at 01:02

## 2019-02-16 RX ADMIN — SODIUM CHLORIDE 1000 ML: 0.9 INJECTION, SOLUTION INTRAVENOUS at 01:02

## 2019-02-16 RX ADMIN — ACETAMINOPHEN 650 MG: 325 TABLET ORAL at 01:02

## 2019-02-16 RX ADMIN — OSELTAMIVIR PHOSPHATE 75 MG: 75 CAPSULE ORAL at 01:02

## 2019-02-16 NOTE — ED TRIAGE NOTES
Patient reports to the Ed with complaints of hot and cold flashes, nausea and vomiting, and chills. He states vomitus consisted of food, mainly liquid and phlegm.  He states he did get the flu vaccination. He has a history of COPD and took Neb treatment around 10:20p due to shortness of breath, states symptom improved with neb treatment.  Denies dizziness or chest pain. AAO x4. Ambulates independently.

## 2019-02-16 NOTE — ED PROVIDER NOTES
Encounter Date: 2/15/2019    SCRIBE #1 NOTE: I, Linda Walker, am scribing for, and in the presence of,  Dr. Vernon. I have scribed the following portions of the note - Other sections scribed: HPI, ROS, PE.       History     Chief Complaint   Patient presents with    flu like symptoms     C/o fever/chills and n/v x 1 day. Reports had some trouble breathing earlier but resolved after his nebulizer treatment.      Time patient was seen by the provider: 12:27 AM      The patient is a 73 y.o. male with HTN, COPD, CAD w/ stents and SHOLA who presents with flu-like symptoms. Patient reports he started feeling bad last night. He states he gradually felt worse today with chills, subjective fever, nausea, a few episodes of vomiting, and body aches. He denies SOB at this time, took neb treatment 2 hours PTA. He also took mucinex earlier today and only had minimal improvement in his symptoms. He denies sore throat, cough, chest pain, palpitations, abdominal pain, diarrhea, urinary symptoms and change in appetite. He denies sick contacts. He did received the seasonal flu shot.       The history is provided by the patient.     Review of patient's allergies indicates:   Allergen Reactions    Brilinta [ticagrelor] Itching    Lisinopril      Other reaction(s): cough    Metoprolol succinate Rash     Past Medical History:   Diagnosis Date    Benign neoplasm of colon 10/1/2013    Bronchitis chronic     CAD (coronary artery disease) 10/31/2013    COPD (chronic obstructive pulmonary disease)     Emphysema of lung     GERD (gastroesophageal reflux disease)     Hx of colonic polyps     Hypertension     NAFLD (nonalcoholic fatty liver disease) 3/27/2017    SHOLA on CPAP     RLS (restless legs syndrome)     Screen for colon cancer 8/30/2013     Past Surgical History:   Procedure Laterality Date    bilateral foot surgery  1993    CARDIAC CATHETERIZATION  2013    x1    CATARACT EXTRACTION, BILATERAL      COLON SURGERY  2013     Ace Mott MD    COLONOSCOPY N/A 3/21/2018    [facility] (4TH F[facility])    COLONOSCOPY N/A 2013    [facility] ([facility])    COLONOSCOPY N/A 2013    [facility] (4TH [facility]LR)    HEMICOLECTOMY, RIGHT N/A 10/1/2013    [facility] OR 2ND FLR    scrotal abscess removal       Family History   Problem Relation Age of Onset    Heart disease Mother     Heart attack Mother     Hypertension Mother     Asthma Neg Hx     Emphysema Neg Hx     Prostate cancer Neg Hx     Cirrhosis Neg Hx      Social History     Tobacco Use    Smoking status: Former Smoker     Packs/day: 1.00     Years: 40.00     Pack years: 40.00     Types: Cigarettes     Last attempt to quit: 8/15/2012     Years since quittin.5    Smokeless tobacco: Never Used   Substance Use Topics    Alcohol use: Yes     Comment: social use only    Drug use: No     Review of Systems   Constitutional: Positive for chills and fever (subjective).   HENT: Negative for sore throat.    Eyes: Negative for visual disturbance.   Respiratory: Negative for cough and shortness of breath.    Cardiovascular: Negative for chest pain.   Gastrointestinal: Positive for nausea and vomiting. Negative for abdominal pain and diarrhea.   Genitourinary: Negative for dysuria.   Musculoskeletal: Positive for myalgias. Negative for back pain.   Skin: Negative for rash.   Neurological: Negative for dizziness, syncope, weakness, light-headedness and headaches.   Hematological: Does not bruise/bleed easily.       Physical Exam     Initial Vitals [02/15/19 2345]   BP Pulse Resp Temp SpO2   (!) 165/118 (!) 114 18 99.3 °F (37.4 °C) 96 %      MAP       --         Physical Exam    Nursing note and vitals reviewed.  Constitutional: He appears well-developed and well-nourished. He is not diaphoretic. No distress.   HENT:   Head: Normocephalic and atraumatic.    Mouth/Throat: Oropharynx is clear and moist and mucous membranes are normal. No oropharyngeal exudate or posterior oropharyngeal erythema.   Eyes: Conjunctivae are normal. No scleral icterus.   Neck: Normal range of motion. Neck supple. No JVD present.   Cardiovascular: Regular rhythm. Tachycardia present.  Exam reveals no gallop and no friction rub.    No murmur heard.  Pulmonary/Chest: Effort normal and breath sounds normal. No respiratory distress. He has no decreased breath sounds. He has no wheezes. He has no rhonchi. He has no rales.   Abdominal: Soft. Bowel sounds are normal. He exhibits no distension. There is no tenderness.   Musculoskeletal: Normal range of motion. He exhibits no edema or tenderness.   Neurological: He is alert and oriented to person, place, and time. He has normal strength. No cranial nerve deficit or sensory deficit. Coordination normal. GCS score is 15. GCS eye subscore is 4. GCS verbal subscore is 5. GCS motor subscore is 6.   Skin: Skin is warm and dry. No pallor.   Psychiatric: He has a normal mood and affect.         ED Course   Procedures  Labs Reviewed   POCT INFLUENZA A/B MOLECULAR - Abnormal; Notable for the following components:       Result Value    POC Molecular Influenza A Ag Positive (*)     POC Molecular Influenza B Ag Positive (*)     All other components within normal limits     EKG Readings: (Independently Interpreted)   Initial Reading: No STEMI. Rhythm: Sinus Tachycardia. Heart Rate: 107. Ectopy: No Ectopy. Conduction: Normal. ST Segments: Normal ST Segments. T Waves: Normal. Axis: Normal.       Imaging Results    None          Medical Decision Making:   History:   Old Medical Records: I decided to obtain old medical records.  Clinical Tests:   Lab Tests: Ordered and Reviewed  Radiological Study: Ordered and Reviewed  Medical Tests: Ordered and Reviewed  Medical decision making:  Patient evaluated for acute flu-like symptoms including fever, chills, nausea,  vomiting, body aches.  Febrile on arrival to the ED.  Patient positive for influenza A/B.  Fever resolved with oral antipyretic.  Mild tachycardia on arrival.  This resolved with IV fluid hydration.  Patient feeling significantly better following ED management.  Discharged home on Tamiflu and Zofran with instructions to follow up with PCP.  Instructed to return immediately for difficulty breathing, chest pain, or other concerning symptoms.            Scribe Attestation:   Scribe #1: I performed the above scribed service and the documentation accurately describes the services I performed. I attest to the accuracy of the note.    Attending Attestation:           Physician Attestation for Scribe:  Physician Attestation Statement for Scribe #1: I, Christiano Vernon III, M.D., reviewed documentation, as scribed by Linda Walker in my presence, and it is both accurate and complete.                    Clinical Impression:   The primary encounter diagnosis was Influenza. Diagnoses of Fatigue and Non-intractable vomiting with nausea, unspecified vomiting type were also pertinent to this visit.      Disposition:   Disposition: Discharged  Condition: Stable                        Christiano Vernon III, MD  02/18/19 9209

## 2019-02-17 NOTE — PROVIDER PROGRESS NOTES - EMERGENCY DEPT.
ED Physician Hand-off Note:    ED Course: I assumed care of patient from off-going ED physician team. Briefly, Patient is a 73 y.o. male with HTN, COPD, CAD w/ stents and SHOLA who presents with flu-like symptoms.    At the time of signout plan was pending IVF resuscitation.    Medications given in the ED:    Medications   sodium chloride 0.9% bolus 1,000 mL (0 mLs Intravenous Stopped 2/16/19 0309)   ondansetron injection 4 mg (4 mg Intravenous Given 2/16/19 0139)   acetaminophen tablet 650 mg (650 mg Oral Given 2/16/19 0141)   oseltamivir capsule 75 mg (75 mg Oral Given 2/16/19 0150)       Disposition: Discharged  Condition: Stable    Patient flu swab positive. He was given 1st dose of Tamiflu, Tylenol, Zofran and IV fluids while in the ED.  He reports feeling much better with the management.  He is stable for discharge home with prescription for Tamiflu and instructions to take over-the-counter Tylenol or ibuprofen for pain relief. Patient counseled regarding exam, results, diagnosis, treatment, and plan.  Patient verbalized understanding and is agreeable. I have discussed patient case with my supervisory physician, who is in agreement with my assessment and plan.      Impression: Influenza

## 2019-02-18 ENCOUNTER — OFFICE VISIT (OUTPATIENT)
Dept: OTOLARYNGOLOGY | Facility: CLINIC | Age: 74
End: 2019-02-18
Payer: MEDICARE

## 2019-02-18 VITALS
HEART RATE: 74 BPM | BODY MASS INDEX: 33.85 KG/M2 | SYSTOLIC BLOOD PRESSURE: 135 MMHG | DIASTOLIC BLOOD PRESSURE: 97 MMHG | WEIGHT: 235.88 LBS

## 2019-02-18 DIAGNOSIS — H65.92 LEFT OTITIS MEDIA WITH EFFUSION: Primary | ICD-10-CM

## 2019-02-18 PROCEDURE — 99213 OFFICE O/P EST LOW 20 MIN: CPT | Mod: PBBFAC | Performed by: OTOLARYNGOLOGY

## 2019-02-18 PROCEDURE — 69433 CREATE EARDRUM OPENING: CPT | Mod: S$PBB,LT,, | Performed by: OTOLARYNGOLOGY

## 2019-02-18 PROCEDURE — 99499 NO LOS: ICD-10-PCS | Mod: S$PBB,,, | Performed by: OTOLARYNGOLOGY

## 2019-02-18 PROCEDURE — 69433 PR CREATE EARDRUM OPENING,LOCAL ANESTH: ICD-10-PCS | Mod: S$PBB,LT,, | Performed by: OTOLARYNGOLOGY

## 2019-02-18 PROCEDURE — 99499 UNLISTED E&M SERVICE: CPT | Mod: S$PBB,,, | Performed by: OTOLARYNGOLOGY

## 2019-02-18 PROCEDURE — 99999 PR PBB SHADOW E&M-EST. PATIENT-LVL III: CPT | Mod: PBBFAC,,, | Performed by: OTOLARYNGOLOGY

## 2019-02-18 PROCEDURE — 99999 PR PBB SHADOW E&M-EST. PATIENT-LVL III: ICD-10-PCS | Mod: PBBFAC,,, | Performed by: OTOLARYNGOLOGY

## 2019-02-18 PROCEDURE — 69433 CREATE EARDRUM OPENING: CPT | Mod: PBBFAC | Performed by: OTOLARYNGOLOGY

## 2019-02-18 NOTE — PROGRESS NOTES
Still with L OME.    After informed consent regarding infection, perforation, early extrusion and possible cholesteatoma formation the patient was brought to the minor procedure room and placed in the supine position. The operating microscope was then brought onto the field and the Left tympanic membrane was visualized. Using a sterile, single use phenol kit the TM was sterilized and anesthetized. A myringotomy incision was made and the effusion evacuated. A sterile Mann V tympanostomy tube was placed and the procedure was terminated. The patient tolerated the procedure well.      Water precautions discussed. RTC as directed.

## 2019-02-22 ENCOUNTER — OFFICE VISIT (OUTPATIENT)
Dept: UROLOGY | Facility: CLINIC | Age: 74
End: 2019-02-22
Payer: MEDICARE

## 2019-02-22 VITALS
WEIGHT: 240.31 LBS | BODY MASS INDEX: 34.48 KG/M2 | DIASTOLIC BLOOD PRESSURE: 85 MMHG | SYSTOLIC BLOOD PRESSURE: 137 MMHG | HEART RATE: 81 BPM

## 2019-02-22 DIAGNOSIS — N49.2 SCROTAL ABSCESS: Primary | ICD-10-CM

## 2019-02-22 PROCEDURE — 99213 OFFICE O/P EST LOW 20 MIN: CPT | Mod: S$PBB,,, | Performed by: UROLOGY

## 2019-02-22 PROCEDURE — 99999 PR PBB SHADOW E&M-EST. PATIENT-LVL III: ICD-10-PCS | Mod: PBBFAC,,,

## 2019-02-22 PROCEDURE — 99213 PR OFFICE/OUTPT VISIT, EST, LEVL III, 20-29 MIN: ICD-10-PCS | Mod: S$PBB,,, | Performed by: UROLOGY

## 2019-02-22 PROCEDURE — 99999 PR PBB SHADOW E&M-EST. PATIENT-LVL III: CPT | Mod: PBBFAC,,,

## 2019-02-22 PROCEDURE — 99213 OFFICE O/P EST LOW 20 MIN: CPT | Mod: PBBFAC

## 2019-02-22 NOTE — LETTER
February 22, 2019        LISA Mccain MD  [address] [facility] [address]  [place] [address]             [facility] - Urology 4th Floor  [address]  [place] [address]  Phone: [phone]   Patient: Jordin Allen Jr.   MR Number: [id number]   YOB: 1945   Date of Visit: 2/22/2019       Dear Dr. Mccain:    Thank you for referring Jordin Allen to me for evaluation. Attached you will find relevant portions of my assessment and plan of care.    If you have questions, please do not hesitate to call me. I look forward to following Jordin Allen along with you.    Sincerely,      Bolivar Castillo MD            CC  No Recipients    Enclosure

## 2019-02-22 NOTE — PROGRESS NOTES
Urology - [facility]  Resident Clinic  Staff - Dr. Bolivar Castillo MD    SUBJECTIVE:     Chief Complaint: scrotal abscess     History of Present Illness:  Jordin Allen Jr. is a 73 y.o. male who presents to clinic for follow up from drained scrotal abscess.     Originally was drained in clinic with Dr. Dunne on 1/15. Patient then had issues with packing and represented on 1/22 with further abscess identified. This was drained by Dr. Freed and Dr. Gallardo. He was instructed on packing and has had home health coming.    He presents today for follow up. No complaints noted. No fevers. No pain. Has finished course of antibiotics.     Review of patient's allergies indicates:   Allergen Reactions    Brilinta [ticagrelor] Itching    Lisinopril      Other reaction(s): cough    Metoprolol succinate Rash       Past Medical History:   Diagnosis Date    Benign neoplasm of colon 10/1/2013    Bronchitis chronic     CAD (coronary artery disease) 10/31/2013    COPD (chronic obstructive pulmonary disease)     Emphysema of lung     GERD (gastroesophageal reflux disease)     Hx of colonic polyps     Hypertension     NAFLD (nonalcoholic fatty liver disease) 3/27/2017    SHOLA on CPAP     RLS (restless legs syndrome)     Screen for colon cancer 8/30/2013     Past Surgical History:   Procedure Laterality Date    bilateral foot surgery  1993    CARDIAC CATHETERIZATION  2013    x1    CATARACT EXTRACTION, BILATERAL      COLON SURGERY  2013    Ace Mott MD    COLONOSCOPY N/A 3/21/2018    [facility]formed [facility] ENDO (4TH FLR)    COLONOSCOPY N/A 8/30/2013    [facility] END[facility] (4TH FLR)    COLONOSCOPY N/A 7/12/2013    [facility] ENDO (4TH FLR)    HEMICOLECTOMY, RIGHT N/A 10/1/2013    [facility] OR 2ND FLR    scrotal abscess removal       Family History   Problem Relation Age of Onset    Heart  "disease Mother     Heart attack Mother     Hypertension Mother     Asthma Neg Hx     Emphysema Neg Hx     Prostate cancer Neg Hx     Cirrhosis Neg Hx      Social History     Tobacco Use    Smoking status: Former Smoker     Packs/day: 1.00     Years: 40.00     Pack years: 40.00     Types: Cigarettes     Last attempt to quit: 8/15/2012     Years since quittin.5    Smokeless tobacco: Never Used   Substance Use Topics    Alcohol use: Yes     Comment: social use only    Drug use: No        Review of Systems   Constitutional: Negative for chills and fever.   HENT: Negative for trouble swallowing.    Eyes: Negative for pain.   Respiratory: Negative for cough and shortness of breath.    Cardiovascular: Negative for chest pain and palpitations.   Gastrointestinal: Negative for nausea and vomiting.   Genitourinary: Negative for difficulty urinating and testicular pain.   Neurological: Negative for weakness.   Psychiatric/Behavioral: Negative for behavioral problems.       OBJECTIVE:     Anticoagulation:  No    Estimated body mass index is 34.48 kg/m² as calculated from the following:    Height as of 2/15/19: 5' 10" (1.778 m).    Weight as of this encounter: 109 kg (240 lb 4.8 oz).    Vital Signs (Most Recent)  Pulse: 81 (19 1259)  BP: 137/85 (19 1259)    Physical Exam   Constitutional: He is oriented to person, place, and time. He appears well-developed and well-nourished. No distress.   HENT:   Head: Normocephalic and atraumatic.   Eyes: No scleral icterus.   Neck: No JVD present.   Cardiovascular: Normal rate and regular rhythm.    Pulmonary/Chest: Effort normal. No respiratory distress.   Abdominal: Soft. He exhibits no distension. There is no tenderness. There is no rebound and no guarding.   Genitourinary:   Genitourinary Comments: No erythema or fluctuance. No further wound noted to inferior or anterior scrotum. Very well healed.    Neurological: He is alert and oriented to person, place, and " time.   Skin: He is not diaphoretic. No pallor.     Psychiatric: He has a normal mood and affect. His behavior is normal.       BMP  Lab Results   Component Value Date     10/10/2018    K 4.5 10/10/2018     10/10/2018    CO2 25 10/10/2018    BUN 20 10/10/2018    CREATININE 1.0 10/10/2018    CALCIUM 10.1 10/10/2018    ANIONGAP 12 10/10/2018    ESTGFRAFRICA >60.0 10/10/2018    EGFRNONAA >60.0 10/10/2018       Lab Results   Component Value Date    WBC 9.16 10/10/2018    HGB 13.2 (L) 10/10/2018    HCT 43.9 10/10/2018    MCV 87 10/10/2018     10/10/2018       Lab Results   Component Value Date    PSA 1.8 10/10/2018    PSA 1.9 08/08/2017    PSA 1.6 07/28/2016    PSA 1.5 07/06/2015    PSA 1.8 06/02/2014    PSA 1.81 05/30/2013    PSA 1.40 04/30/2012    PSA 1.2 04/26/2011    PSA 0.93 04/21/2010    PSA 0.97 04/16/2009    PSA 1.1 02/06/2008    PSA 1.5 01/05/2007    PSA 0.7 05/01/2006    PSA 1.1 02/11/2005    PSA 0.7 01/23/2004       Imaging:  No pertinent imaging     ASSESSMENT     1. Scrotal abscess        PLAN:     No need to continue packing as wounds have healed very well  Abx course completed  Return to clinic PRSURJIT Gonzalez MD

## 2019-02-28 ENCOUNTER — EXTERNAL CHRONIC CARE MANAGEMENT (OUTPATIENT)
Dept: PRIMARY CARE CLINIC | Facility: CLINIC | Age: 74
End: 2019-02-28
Payer: MEDICARE

## 2019-02-28 PROCEDURE — 99490 CHRNC CARE MGMT STAFF 1ST 20: CPT | Mod: S$PBB,,, | Performed by: INTERNAL MEDICINE

## 2019-02-28 PROCEDURE — 99490 CHRNC CARE MGMT STAFF 1ST 20: CPT | Mod: PBBFAC,PO | Performed by: INTERNAL MEDICINE

## 2019-02-28 PROCEDURE — 99490 PR CHRONIC CARE MGMT, 1ST 20 MIN: ICD-10-PCS | Mod: S$PBB,,, | Performed by: INTERNAL MEDICINE

## 2019-03-03 ENCOUNTER — PATIENT MESSAGE (OUTPATIENT)
Dept: INTERNAL MEDICINE | Facility: CLINIC | Age: 74
End: 2019-03-03

## 2019-03-03 DIAGNOSIS — Z12.11 SCREENING FOR COLON CANCER: Primary | ICD-10-CM

## 2019-03-04 NOTE — TELEPHONE ENCOUNTER
Dr. Mccain please advise if pt, indeed, needs to have a repeat c-scope.    Please sign attached orders.    Thanks.

## 2019-03-15 DIAGNOSIS — Z86.010 ENCOUNTER FOR COLONOSCOPY DUE TO HISTORY OF COLONIC POLYP: Primary | ICD-10-CM

## 2019-03-15 DIAGNOSIS — Z12.11 ENCOUNTER FOR COLONOSCOPY DUE TO HISTORY OF COLONIC POLYP: Primary | ICD-10-CM

## 2019-03-15 RX ORDER — POLYETHYLENE GLYCOL 3350, SODIUM SULFATE ANHYDROUS, SODIUM BICARBONATE, SODIUM CHLORIDE, POTASSIUM CHLORIDE 236; 22.74; 6.74; 5.86; 2.97 G/4L; G/4L; G/4L; G/4L; G/4L
POWDER, FOR SOLUTION ORAL
Qty: 4000 ML | Refills: 0 | Status: SHIPPED | OUTPATIENT
Start: 2019-03-15 | End: 2019-12-17

## 2019-03-31 ENCOUNTER — EXTERNAL CHRONIC CARE MANAGEMENT (OUTPATIENT)
Dept: PRIMARY CARE CLINIC | Facility: CLINIC | Age: 74
End: 2019-03-31
Payer: MEDICARE

## 2019-03-31 PROCEDURE — 99490 PR CHRONIC CARE MGMT, 1ST 20 MIN: ICD-10-PCS | Mod: S$PBB,,, | Performed by: INTERNAL MEDICINE

## 2019-03-31 PROCEDURE — 99490 CHRNC CARE MGMT STAFF 1ST 20: CPT | Mod: PBBFAC,PO | Performed by: INTERNAL MEDICINE

## 2019-03-31 PROCEDURE — 99490 CHRNC CARE MGMT STAFF 1ST 20: CPT | Mod: S$PBB,,, | Performed by: INTERNAL MEDICINE

## 2019-04-08 ENCOUNTER — OFFICE VISIT (OUTPATIENT)
Dept: URGENT CARE | Facility: CLINIC | Age: 74
End: 2019-04-08
Payer: MEDICARE

## 2019-04-08 ENCOUNTER — PATIENT MESSAGE (OUTPATIENT)
Dept: INTERNAL MEDICINE | Facility: CLINIC | Age: 74
End: 2019-04-08

## 2019-04-08 VITALS
BODY MASS INDEX: 34.36 KG/M2 | TEMPERATURE: 97 F | RESPIRATION RATE: 18 BRPM | HEART RATE: 76 BPM | HEIGHT: 70 IN | DIASTOLIC BLOOD PRESSURE: 96 MMHG | OXYGEN SATURATION: 96 % | SYSTOLIC BLOOD PRESSURE: 147 MMHG | WEIGHT: 240 LBS

## 2019-04-08 DIAGNOSIS — J20.9 ACUTE BRONCHITIS, UNSPECIFIED ORGANISM: Primary | ICD-10-CM

## 2019-04-08 PROCEDURE — 96372 THER/PROPH/DIAG INJ SC/IM: CPT | Mod: S$GLB,,, | Performed by: FAMILY MEDICINE

## 2019-04-08 PROCEDURE — 96372 PR INJECTION,THERAP/PROPH/DIAG2ST, IM OR SUBCUT: ICD-10-PCS | Mod: S$GLB,,, | Performed by: FAMILY MEDICINE

## 2019-04-08 PROCEDURE — 99214 OFFICE O/P EST MOD 30 MIN: CPT | Mod: 25,S$GLB,, | Performed by: FAMILY MEDICINE

## 2019-04-08 PROCEDURE — 99214 PR OFFICE/OUTPT VISIT, EST, LEVL IV, 30-39 MIN: ICD-10-PCS | Mod: 25,S$GLB,, | Performed by: FAMILY MEDICINE

## 2019-04-08 RX ORDER — DOXYCYCLINE 100 MG/1
100 CAPSULE ORAL 2 TIMES DAILY
Qty: 14 CAPSULE | Refills: 0 | Status: SHIPPED | OUTPATIENT
Start: 2019-04-08 | End: 2019-04-15

## 2019-04-08 RX ORDER — BETAMETHASONE SODIUM PHOSPHATE AND BETAMETHASONE ACETATE 3; 3 MG/ML; MG/ML
9 INJECTION, SUSPENSION INTRA-ARTICULAR; INTRALESIONAL; INTRAMUSCULAR; SOFT TISSUE
Status: COMPLETED | OUTPATIENT
Start: 2019-04-08 | End: 2019-04-08

## 2019-04-08 RX ORDER — PROMETHAZINE HYDROCHLORIDE AND DEXTROMETHORPHAN HYDROBROMIDE 6.25; 15 MG/5ML; MG/5ML
5 SYRUP ORAL 4 TIMES DAILY PRN
Qty: 118 ML | Refills: 0 | Status: SHIPPED | OUTPATIENT
Start: 2019-04-08 | End: 2019-04-18

## 2019-04-08 RX ORDER — PREDNISONE 20 MG/1
40 TABLET ORAL DAILY
Qty: 10 TABLET | Refills: 0 | Status: SHIPPED | OUTPATIENT
Start: 2019-04-08 | End: 2019-04-13

## 2019-04-08 RX ORDER — BENZONATATE 100 MG/1
100 CAPSULE ORAL EVERY 6 HOURS PRN
Qty: 30 CAPSULE | Refills: 1 | Status: SHIPPED | OUTPATIENT
Start: 2019-04-08 | End: 2020-04-07

## 2019-04-08 RX ORDER — LOSARTAN POTASSIUM 100 MG/1
TABLET ORAL
Qty: 30 TABLET | Refills: 6 | Status: SHIPPED | OUTPATIENT
Start: 2019-04-08 | End: 2019-10-03 | Stop reason: SDUPTHER

## 2019-04-08 RX ADMIN — BETAMETHASONE SODIUM PHOSPHATE AND BETAMETHASONE ACETATE 9 MG: 3; 3 INJECTION, SUSPENSION INTRA-ARTICULAR; INTRALESIONAL; INTRAMUSCULAR; SOFT TISSUE at 11:04

## 2019-04-08 NOTE — PROGRESS NOTES
"Subjective:       Patient ID: Jordin Allen Jr. is a 73 y.o. male.    Vitals:  height is 5' 10" (1.778 m) and weight is 108.9 kg (240 lb). His oral temperature is 97.1 °F (36.2 °C). His blood pressure is 147/96 (abnormal) and his pulse is 76. His respiration is 18 and oxygen saturation is 96%.     Chief Complaint: Cough    This is a 73 y.o. male who presents today with a chief complaint of   Cough, congestion, post nasal drip for a week now taking Mucinex and not helping     Cough   This is a new problem. The current episode started in the past 7 days. The problem has been gradually worsening. The problem occurs every few minutes. The cough is productive of brown sputum. Associated symptoms include postnasal drip, shortness of breath and wheezing. Pertinent negatives include no chills, ear pain, eye redness, fever, hemoptysis, myalgias, rash or sore throat. Nothing aggravates the symptoms. He has tried steroid inhaler and ipratropium inhaler (mucinex) for the symptoms. His past medical history is significant for bronchitis and COPD.       Constitution: Negative for chills, sweating, fatigue and fever.   HENT: Positive for congestion and postnasal drip. Negative for ear pain, sinus pain, sinus pressure, sore throat and voice change.    Neck: Negative for painful lymph nodes.   Eyes: Negative for eye redness.   Respiratory: Positive for cough, sputum production, COPD, shortness of breath and wheezing. Negative for chest tightness, bloody sputum, stridor and asthma.    Gastrointestinal: Negative for nausea and vomiting.   Musculoskeletal: Negative for muscle ache.   Skin: Negative for rash.   Allergic/Immunologic: Negative for seasonal allergies and asthma.   Hematologic/Lymphatic: Negative for swollen lymph nodes.       Objective:      Physical Exam   Constitutional: He appears well-developed and well-nourished.   HENT:   Head: Normocephalic and atraumatic.   Mouth/Throat: Posterior oropharyngeal erythema present. " No oropharyngeal exudate.   Eyes: Pupils are equal, round, and reactive to light. EOM are normal.   Neck: Normal range of motion. Neck supple.   Cardiovascular: Normal rate, regular rhythm and normal heart sounds.   Pulmonary/Chest: Effort normal. He has wheezes. He has no rales.   Lymphadenopathy:     He has cervical adenopathy.   Nursing note and vitals reviewed.      Assessment:       1. Acute bronchitis, unspecified organism        Plan:         Acute bronchitis, unspecified organism  -     doxycycline (VIBRAMYCIN) 100 MG Cap; Take 1 capsule (100 mg total) by mouth 2 (two) times daily. for 7 days  Dispense: 14 capsule; Refill: 0  -     betamethasone acetate-betamethasone sodium phosphate injection 9 mg  -     benzonatate (TESSALON PERLES) 100 MG capsule; Take 1 capsule (100 mg total) by mouth every 6 (six) hours as needed for Cough.  Dispense: 30 capsule; Refill: 1  -     promethazine-dextromethorphan (PROMETHAZINE-DM) 6.25-15 mg/5 mL Syrp; Take 5 mLs by mouth 4 (four) times daily as needed.  Dispense: 118 mL; Refill: 0    Other orders  -     predniSONE (DELTASONE) 20 MG tablet; Take 2 tablets (40 mg total) by mouth once daily. for 5 days  Dispense: 10 tablet; Refill: 0

## 2019-04-08 NOTE — PATIENT INSTRUCTIONS
Bronchitis with Wheezing (Viral or Bacterial: Adult)    Bronchitis is an infection of the air passages. It often occurs during a cold and is usually caused by a virus. Symptoms include cough with mucus (phlegm) and low-grade fever. This illness is contagious during the first few days and is spread through the air by coughing and sneezing, or by direct contact (touching the sick person and then touching your own eyes, nose, or mouth).  If there is a lot of inflammation, air flow is restricted. The air passages may also go into spasm, especially if you have asthma. This causes wheezing and difficulty breathing even in people who do not have asthma.  Bronchitis usually lasts 7 to 14 days. The wheezing should improve with treatment during the first week. An inhaler is often prescribed to relax the air passages and stop wheezing. Antibiotics will be prescribed if your doctor thinks there is also a secondary bacterial infection.  Home care  · If symptoms are severe, rest at home for the first 2 to 3 days. When you go back to your usual activities, don't let yourself get too tired.  · Do not smoke. Also avoid being exposed to secondhand smoke.  · You may use over-the-counter medicine to control fever or pain, unless another medicine was prescribed. Note: If you have chronic liver or kidney disease or have ever had a stomach ulcer or gastrointestinal bleeding, talk with your healthcare provider before using these medicines. Also talk to your provider if you are taking medicine to prevent blood clots.) Aspirin should never be given to anyone younger than 18 years of age who is ill with a viral infection or fever. It may cause severe liver or brain damage.  · Your appetite may be poor, so a light diet is fine. Avoid dehydration by drinking 6 to 8 glasses of fluids per day (such as water, soft drinks, sports drinks, juices, tea, or soup). Extra fluids will help loosen secretions in the nose and lungs.  · Over-the-counter  cough, cold, and sore-throat medicines will not shorten the length of the illness, but they may be helpful to reduce symptoms. (Note: Do not use decongestants if you have high blood pressure.)  · If you were given an inhaler, use it exactly as directed. If you need to use it more often than prescribed, your condition may be worsening. If this happens, contact your healthcare provider.  · If prescribed, finish all antibiotic medicine, even if you are feeling better after only a few days.  Follow-up care  Follow up with your healthcare provider, or as advised. If you had an X-ray or ECG (electrocardiogram), a specialist will review it. You will be notified of any new findings that may affect your care.  Note: If you are age 65 or older, or if you have a chronic lung disease or condition that affects your immune system, or you smoke, talk to your healthcare provider about having a pneumococcal vaccinations and a yearly influenza vaccination (flu shot).  When to seek medical advice  Call your healthcare provider right away if any of these occur:  · Fever of 100.4°F (38°C) or higher  · Coughing up increasing amounts of colored sputum  · Weakness, drowsiness, headache, facial pain, ear pain, or a stiff neck  Call 911, or get immediate medical care  Contact emergency services right away if any of these occur.  · Coughing up blood  · Worsening weakness, drowsiness, headache, or stiff neck  · Increased wheezing not helped with medication, shortness of breath, or pain with breathing  Date Last Reviewed: 9/13/2015  © 6788-4334 [employer]. [address] [place] [address], [place] [address]. All rights reserved. This information is not intended as a substitute for professional medical care. Always follow your healthcare professional's instructions.

## 2019-04-09 ENCOUNTER — PATIENT MESSAGE (OUTPATIENT)
Dept: PULMONOLOGY | Facility: CLINIC | Age: 74
End: 2019-04-09

## 2019-04-09 DIAGNOSIS — J43.2 CENTRILOBULAR EMPHYSEMA: Primary | ICD-10-CM

## 2019-04-10 RX ORDER — IPRATROPIUM BROMIDE AND ALBUTEROL SULFATE 2.5; .5 MG/3ML; MG/3ML
SOLUTION RESPIRATORY (INHALATION)
Qty: 90 VIAL | Refills: 3 | Status: SHIPPED | OUTPATIENT
Start: 2019-04-10 | End: 2020-04-20

## 2019-04-12 ENCOUNTER — ANESTHESIA (OUTPATIENT)
Dept: ENDOSCOPY | Facility: HOSPITAL | Age: 74
End: 2019-04-12
Payer: MEDICARE

## 2019-04-12 ENCOUNTER — ANESTHESIA EVENT (OUTPATIENT)
Dept: ENDOSCOPY | Facility: HOSPITAL | Age: 74
End: 2019-04-12
Payer: MEDICARE

## 2019-04-12 ENCOUNTER — HOSPITAL ENCOUNTER (OUTPATIENT)
Facility: HOSPITAL | Age: 74
Discharge: HOME | End: 2019-04-12
Attending: SURGERY | Admitting: SURGERY
Payer: MEDICARE

## 2019-04-12 VITALS
HEART RATE: 91 BPM | SYSTOLIC BLOOD PRESSURE: 138 MMHG | HEIGHT: 70 IN | TEMPERATURE: 98 F | RESPIRATION RATE: 20 BRPM | DIASTOLIC BLOOD PRESSURE: 82 MMHG | OXYGEN SATURATION: 96 % | WEIGHT: 240 LBS | BODY MASS INDEX: 34.36 KG/M2

## 2019-04-12 DIAGNOSIS — Z86.010 PERSONAL HISTORY OF COLONIC POLYPS: ICD-10-CM

## 2019-04-12 DIAGNOSIS — D12.6 ADENOMATOUS POLYP OF COLON, UNSPECIFIED PART OF COLON: Primary | ICD-10-CM

## 2019-04-12 PROBLEM — Z86.0100 PERSONAL HISTORY OF COLONIC POLYPS: Status: ACTIVE | Noted: 2019-04-12

## 2019-04-12 PROCEDURE — 88305 TISSUE EXAM BY PATHOLOGIST: CPT | Mod: 26,,, | Performed by: PATHOLOGY

## 2019-04-12 PROCEDURE — 25000003 PHARM REV CODE 250: Performed by: SURGERY

## 2019-04-12 PROCEDURE — 37000009 HC ANESTHESIA EA ADD 15 MINS: Performed by: SURGERY

## 2019-04-12 PROCEDURE — 45385 COLONOSCOPY W/LESION REMOVAL: CPT | Performed by: SURGERY

## 2019-04-12 PROCEDURE — 63600175 PHARM REV CODE 636 W HCPCS: Performed by: NURSE ANESTHETIST, CERTIFIED REGISTERED

## 2019-04-12 PROCEDURE — 25000003 PHARM REV CODE 250: Performed by: NURSE ANESTHETIST, CERTIFIED REGISTERED

## 2019-04-12 PROCEDURE — 37000008 HC ANESTHESIA 1ST 15 MINUTES: Performed by: SURGERY

## 2019-04-12 PROCEDURE — E9220 PRA ENDO ANESTHESIA: HCPCS | Mod: PT,,, | Performed by: NURSE ANESTHETIST, CERTIFIED REGISTERED

## 2019-04-12 PROCEDURE — 88305 TISSUE EXAM BY PATHOLOGIST: CPT | Performed by: PATHOLOGY

## 2019-04-12 PROCEDURE — 88305 TISSUE SPECIMEN TO PATHOLOGY - SURGERY: ICD-10-PCS | Mod: 26,,, | Performed by: PATHOLOGY

## 2019-04-12 PROCEDURE — 45380 COLONOSCOPY AND BIOPSY: CPT | Performed by: SURGERY

## 2019-04-12 PROCEDURE — 27201012 HC FORCEPS, HOT/COLD, DISP: Performed by: SURGERY

## 2019-04-12 PROCEDURE — 45385 PR COLONOSCOPY,REMV LESN,SNARE: ICD-10-PCS | Mod: PT,,, | Performed by: SURGERY

## 2019-04-12 PROCEDURE — 45380 COLONOSCOPY AND BIOPSY: CPT | Mod: 59,,, | Performed by: SURGERY

## 2019-04-12 PROCEDURE — 45385 COLONOSCOPY W/LESION REMOVAL: CPT | Mod: PT,,, | Performed by: SURGERY

## 2019-04-12 PROCEDURE — E9220 PRA ENDO ANESTHESIA: ICD-10-PCS | Mod: PT,,, | Performed by: NURSE ANESTHETIST, CERTIFIED REGISTERED

## 2019-04-12 PROCEDURE — 27201089 HC SNARE, DISP (ANY): Performed by: SURGERY

## 2019-04-12 PROCEDURE — 45380 PR COLONOSCOPY,BIOPSY: ICD-10-PCS | Mod: 59,,, | Performed by: SURGERY

## 2019-04-12 RX ORDER — LIDOCAINE HCL/PF 100 MG/5ML
SYRINGE (ML) INTRAVENOUS
Status: DISCONTINUED | OUTPATIENT
Start: 2019-04-12 | End: 2019-04-12

## 2019-04-12 RX ORDER — GLYCOPYRROLATE 0.2 MG/ML
INJECTION INTRAMUSCULAR; INTRAVENOUS
Status: DISCONTINUED | OUTPATIENT
Start: 2019-04-12 | End: 2019-04-12

## 2019-04-12 RX ORDER — SODIUM CHLORIDE 9 MG/ML
INJECTION, SOLUTION INTRAVENOUS CONTINUOUS
Status: DISCONTINUED | OUTPATIENT
Start: 2019-04-12 | End: 2019-04-12 | Stop reason: HOSPADM

## 2019-04-12 RX ORDER — PROPOFOL 10 MG/ML
VIAL (ML) INTRAVENOUS
Status: DISCONTINUED | OUTPATIENT
Start: 2019-04-12 | End: 2019-04-12

## 2019-04-12 RX ADMIN — GLYCOPYRROLATE 0.2 MG: 0.2 INJECTION, SOLUTION INTRAMUSCULAR; INTRAVENOUS at 08:04

## 2019-04-12 RX ADMIN — LIDOCAINE HYDROCHLORIDE 50 MG: 20 INJECTION, SOLUTION INTRAVENOUS at 08:04

## 2019-04-12 RX ADMIN — PROPOFOL 100 MG: 10 INJECTION, EMULSION INTRAVENOUS at 08:04

## 2019-04-12 RX ADMIN — PROPOFOL 50 MG: 10 INJECTION, EMULSION INTRAVENOUS at 08:04

## 2019-04-12 RX ADMIN — SODIUM CHLORIDE: 0.9 INJECTION, SOLUTION INTRAVENOUS at 07:04

## 2019-04-12 RX ADMIN — PROPOFOL 30 MG: 10 INJECTION, EMULSION INTRAVENOUS at 08:04

## 2019-04-12 NOTE — ANESTHESIA PREPROCEDURE EVALUATION
04/12/2019  Jordin Allen Jr. is a 73 y.o., male.  Past Medical History:   Diagnosis Date    Benign neoplasm of colon 10/1/2013    Bronchitis chronic     CAD (coronary artery disease) 10/31/2013    COPD (chronic obstructive pulmonary disease)     Emphysema of lung     GERD (gastroesophageal reflux disease)     Hx of colonic polyps     Hypertension     NAFLD (nonalcoholic fatty liver disease) 3/27/2017    SHOLA on CPAP     RLS (restless legs syndrome)     Screen for colon cancer 8/30/2013     Past Surgical History:   Procedure Laterality Date    bilateral foot surgery  1993    CARDIAC CATHETERIZATION  2013    x1    CATARACT EXTRACTION, BILATERAL      COLON SURGERY  2013    Ace Mott MD    COLONOSCOPY N/A 3/21/2018    [facility]formed [facility] at [facility] END[facility] (4TH FLR)    COLONOSCOPY N/A 8/30/2013    [facility] at [facility] END[facility] (4TH FLR)    COLONOSCOPY N/A 7/12/2013    [facility] by [facility] at [facility] END[facility] (4TH FLR)    HEMICOLECTOMY, RIGHT N/A 10/1/2013    Performed by [facility]ott MD at [facility] OR 2ND FLR    scrotal abscess removal             Anesthesia Evaluation    I have reviewed the Patient Summary Reports.     I have reviewed the Medications.     Review of Systems  Anesthesia Hx:  No problems with previous Anesthesia    Social:  Former Smoker    Hematology/Oncology:  Hematology Normal   Oncology Normal     EENT/Dental:EENT/Dental Normal   Cardiovascular:   Exercise tolerance: good Hypertension, well controlled CAD  CABG/stent     Pulmonary:   COPD, mild Sleep Apnea, CPAP    Renal/:  Renal/ Normal     Musculoskeletal:  Musculoskeletal Normal    Neurological:  Neurology Normal    Endocrine:  Endocrine Normal    Dermatological:  Skin Normal    Psych:  Psychiatric Normal           Physical Exam  General:  Well nourished     Airway/Jaw/Neck:  Airway Findings: Mouth Opening: Normal Tongue: Large  General Airway Assessment: Adult  Mallampati: II  TM Distance: Normal, at least 6 cm        Eyes/Ears/Nose:  EYES/EARS/NOSE FINDINGS: Normal    Chest/Lungs:  Chest/Lungs Clear    Heart/Vascular:  Heart Findings: Rate: Normal  Rhythm: Regular Rhythm  Sounds: Normal  Heart murmur: negative Vascular Findings: Normal    Abdomen:  Abdomen Findings: Normal    Musculoskeletal:  Musculoskeletal Findings: Normal   Skin:  Skin Findings: Normal    Mental Status:  Mental Status Findings:  Alert and Oriented, Cooperative         Anesthesia Plan  Type of Anesthesia, risks & benefits discussed:  Anesthesia Type:  general  Patient's Preference: general  Intra-op Monitoring Plan: standard ASA monitors  Intra-op Monitoring Plan Comments:   Post Op Pain Control Plan:   Post Op Pain Control Plan Comments:   Induction:   IV  Beta Blocker:  Patient is not currently on a Beta-Blocker (No further documentation required).       Informed Consent: Patient understands risks and agrees with Anesthesia plan.  Questions answered. Anesthesia consent signed with patient.  ASA Score: 3     Day of Surgery Review of History & Physical: I have interviewed and examined the patient. I have reviewed the patient's H&P dated:  There are no significant changes.  H&P update referred to the surgeon.         Ready For Surgery From Anesthesia Perspective.

## 2019-04-12 NOTE — TRANSFER OF CARE
"Anesthesia Transfer of Care Note    Patient: Jordin Allen Jr.    Procedure(s) Performed: Procedure(s) (LRB):  COLONOSCOPY (N/A)    Patient location: PACU    Anesthesia Type: general    Transport from OR: Transported from OR on room air with adequate spontaneous ventilation    Post pain: adequate analgesia    Post assessment: no apparent anesthetic complications    Post vital signs: stable    Level of consciousness: awake    Nausea/Vomiting: no nausea/vomiting    Complications: none    Transfer of care protocol was followed      Last vitals:   Visit Vitals  /81   Pulse 82   Temp 36.7 °C (98.1 °F)   Resp 18   Ht 5' 10" (1.778 m)   Wt 108.9 kg (240 lb)   SpO2 95%   BMI 34.44 kg/m²     "

## 2019-04-12 NOTE — DISCHARGE INSTRUCTIONS
Understanding Colon and Rectal Polyps    The colon (also called the large intestine) is a muscular tube that forms the last part of the digestive tract. It absorbs water and stores food waste. The colon is about 4 to 6 feet long. The rectum is the last 6 inches of the colon. The colon and rectum have a smooth lining composed of millions of cells. Changes in these cells can lead to growths in the colon that can become cancerous and should be removed. Multiple tests are available to screen for colon cancer, but the colonoscopy is the most recommended test. During colonoscopy, these polyps can be removed. How often you need this test depends on many things including your condition, your family history, symptoms, and what the findings were at the previous colonoscopy.   When the colon lining changes  Changes that happen in the cells that line the colon or rectum can lead to growths called polyps. Over a period of years, polyps can turn cancerous. Removing polyps early may prevent cancer from ever forming.  Polyps  Polyps are fleshy clumps of tissue that form on the lining of the colon or rectum. Small polyps are usually benign (not cancerous). However, over time, cells in a polyp can change and become cancerous. Certain types of polyps known as adenomatous polyps are premalignant. The risk for invasive cancer increases with the size of the polyp and certain cell and gene features. This means that they can become cancerous if they're not removed. Hyperplastic polyps are benign. They can grow quite large and not turn cancerous.   Cancer  Almost all colorectal cancers start when polyp cells begin growing abnormally. As a cancerous tumor grows, it may involve more and more of the colon or rectum. In time, cancer can also grow beyond the colon or rectum and spread to nearby organs or to glands called lymph nodes. The cells can also travel to other parts of the body. This is known as metastasis. The earlier a cancerous  tumor is removed, the better the chance of preventing its spread.    Date Last Reviewed: 8/1/2016  © 7703-9528 [employer]. [address] [place] [address], [place] [address]. All rights reserved. This information is not intended as a substitute for professional medical care. Always follow your healthcare professional's instructions.        Colonoscopy     A camera attached to a flexible tube with a viewing lens is used to take video pictures.     Colonoscopy is a test to view the inside of your lower digestive tract (colon and rectum). Sometimes it can show the last part of the small intestine (ileum). During the test, small pieces of tissue may be removed for testing. This is called a biopsy. Small growths, such as polyps, may also be removed.   Why is colonoscopy done?  The test is done to help look for colon cancer. And it can help find the source of abdominal pain, bleeding, and changes in bowel habits. It may be needed once a year, depending on factors such as your:  · Age  · Health history  · Family health history  · Symptoms  · Results from any prior colonoscopy  Risks and possible complications  These include:  · Bleeding               · A puncture or tear in the colon   · Risks of anesthesia  · A cancer lesion not being seen  Getting ready   To prepare for the test:  · Talk with your healthcare provider about the risks of the test (see below). Also ask your healthcare provider about alternatives to the test.  · Tell your healthcare provider about any medicines you take. Also tell him or her about any health conditions you may have.  · Make sure your rectum and colon are empty for the test. Follow the diet and bowel prep instructions exactly. If you dont, the test may need to be rescheduled.  · Plan for a friend or family member to drive you home after the test.     Colonoscopy provides an inside view of the entire colon.     You may discuss the results with your doctor right away or at a future  visit.  During the test   The test is usually done in the hospital on an outpatient basis. This means you go home the same day. The procedure takes about 30 minutes. During that time:  · You are given relaxing (sedating) medicine through an IV line. You may be drowsy, or fully asleep.  · The healthcare provider will first give you a physical exam to check for anal and rectal problems.  · Then the anus is lubricated and the scope inserted.  · If you are awake, you may have a feeling similar to needing to have a bowel movement. You may also feel pressure as air is pumped into the colon. Its OK to pass gas during the procedure.  · Biopsy, polyp removal, or other treatments may be done during the test.  After the test   You may have gas right after the test. It can help to try to pass it to help prevent later bloating. Your healthcare provider may discuss the results with you right away. Or you may need to schedule a follow-up visit to talk about the results. After the test, you can go back to your normal eating and other activities. You may be tired from the sedation and need to rest for a few hours.  Date Last Reviewed: 11/1/2016  © 7916-1533 [employer]. [address] [place] [address], [place] [address]. All rights reserved. This information is not intended as a substitute for professional medical care. Always follow your healthcare professional's instructions.

## 2019-04-12 NOTE — [employer] PATIENT INSTRUCTIONS
Discharge Summary/Instructions after an Endoscopic Procedure  Patient Name: Jordin Allen  Patient MRN: [id number]  Patient YOB: 1945 Friday, April 12, 2019  John Mantilla MD  RESTRICTIONS:  During your procedure today, you received medications for sedation.  These   medications may affect your judgment, balance and coordination.  Therefore,   for 24 hours, you have the following restrictions:   - DO NOT drive a car, operate machinery, make legal/financial decisions,   sign important papers or drink alcohol.    ACTIVITY:  Today: no heavy lifting, straining or running due to procedural   sedation/anesthesia.  The following day: return to full activity including work.  DIET:  Eat and drink normally unless instructed otherwise.     TREATMENT FOR COMMON SIDE EFFECTS:  - Mild abdominal pain, nausea, belching, bloating or excessive gas:  rest,   eat lightly and use a heating pad.  - Sore Throat: treat with throat lozenges and/or gargle with warm salt   water.  - Because air was used during the procedure, expelling large amounts of air   from your rectum or belching is normal.  - If a bowel prep was taken, you may not have a bowel movement for 1-3 days.    This is normal.  SYMPTOMS TO WATCH FOR AND REPORT TO YOUR PHYSICIAN:  1. Abdominal pain or bloating, other than gas cramps.  2. Chest pain.  3. Back pain.  4. Signs of infection such as: chills or fever occurring within 24 hours   after the procedure.  5. Rectal bleeding, which would show as bright red, maroon, or black stools.   (A tablespoon of blood from the rectum is not serious, especially if   hemorrhoids are present.)  6. Vomiting.  7. Weakness or dizziness.  GO DIRECTLY TO THE NEAREST EMERGENCY ROOM IF YOU HAVE ANY OF THE FOLLOWING:      Difficulty breathing              Chills and/or fever over 101 F   Persistent vomiting and/or vomiting blood   Severe abdominal pain   Severe chest pain   Black, tarry stools   Bleeding- more than one  tablespoon   Any other symptom or condition that you feel may need urgent attention  Your doctor recommends these additional instructions:  If any biopsies were taken, your doctors clinic will contact you in 1 to 2   weeks with any results.  - Discharge patient to home (ambulatory).   - Resume regular diet.   - Continue present medications.   - Await pathology results.   - Repeat colonoscopy in 3 years for surveillance.   - Return to my office PRN.   - Patient has a contact number available for emergencies.  The signs and   symptoms of potential delayed complications were discussed with the   patient.  Return to normal activities tomorrow.  Written discharge   instructions were provided to the patient.  For questions, problems or results please call your physician - John Mantilla MD at Work:  [phone].  [facility], EMERGENCY ROOM PHONE NUMBER: [phone]  IF A COMPLICATION OR EMERGENCY SITUATION ARISES AND YOU ARE UNABLE TO REACH   YOUR PHYSICIAN - GO DIRECTLY TO THE EMERGENCY ROOM.  John Mantilla MD  4/12/2019 8:45:33 AM  This report has been verified and signed electronically.  [employer]

## 2019-04-12 NOTE — H&P
[facility]  History & Physical     Subjective:     Chief Complaint/Reason for Admission: personal history of polyps    History of Present Illness:   Patient 73 y.o. male presents for surveillance colonoscopy.    Pt had a colectomy 6 years ago for large polyp of R colon.  He had f/u colonoscopy last year where it was discovered that he had a 2 cm polyp removed from the transverse colon at the anastomosis.  Pt denies abd pain. No changes in bowel habits.  He suffers with CAD, COPD, and sleep apnea.     Patient Active Problem List    Diagnosis Date Noted    Personal history of colonic polyps 04/12/2019    Scrotal abscess 01/25/2019    Adenomatous polyp of colon 10/17/2018    NAFLD (nonalcoholic fatty liver disease) 03/27/2017    Chronic obstructive pulmonary disease     History of smoking 30 or more pack years 10/15/2015    Obesity, Class I, BMI 30-34.9 09/14/2015    Rash 04/21/2014    CAD (coronary artery disease) 10/31/2013    Benign neoplasm of colon 10/01/2013    Restless leg syndrome 01/05/2013    HTN (hypertension), benign 09/07/2012    Sciatica 09/07/2012    SHOLA (obstructive sleep apnea) 09/07/2012    Centrilobular emphysema 08/23/2012    GERD (gastroesophageal reflux disease) 08/23/2012        Medications Prior to Admission   Medication Sig Dispense Refill Last Dose    albuterol 90 mcg/actuation inhaler Inhale 2 puffs into the lungs every 4 (four) hours as needed for Wheezing. 1 each 12 4/12/2019 at Unknown time    aspirin (ECOTRIN) 81 MG EC tablet Take 81 mg by mouth once.   4/11/2019 at Unknown time    atorvastatin (LIPITOR) 80 MG tablet TAKE 1 TABLET (80 MG TOTAL) BY MOUTH ONCE DAILY. 30 tablet 6 4/12/2019 at Unknown time    benzonatate (TESSALON PERLES) 100 MG capsule Take 1 capsule (100 mg total) by mouth every 6 (six) hours as needed for Cough. 30 capsule 1 4/11/2019 at Unknown time    budesonide-formoterol 160-4.5 mcg (SYMBICORT) 160-4.5 mcg/actuation HFAA INHALE  2 PUFFS INTO THE LUNGS EVERY 12 HOURS. RINSE MOUTH AFTER USE 3 Inhaler 3 4/12/2019 at Unknown time    doxycycline (VIBRAMYCIN) 100 MG Cap Take 1 capsule (100 mg total) by mouth 2 (two) times daily. for 7 days 14 capsule 0 4/11/2019 at Unknown time    hydroCHLOROthiazide (HYDRODIURIL) 25 MG tablet Take 0.5 tablets (12.5 mg total) by mouth once daily. 30 tablet 6 4/12/2019 at Unknown time    losartan (COZAAR) 100 MG tablet TAKE 1 TABLET BY MOUTH EVERY DAY 30 tablet 6 4/12/2019 at Unknown time    omeprazole (PRILOSEC) 20 MG capsule Take 1 capsule (20 mg total) by mouth once daily. 90 capsule 3 4/12/2019 at Unknown time    predniSONE (DELTASONE) 20 MG tablet Take 2 tablets (40 mg total) by mouth once daily. for 5 days 10 tablet 0 4/12/2019 at Unknown time    promethazine-dextromethorphan (PROMETHAZINE-DM) 6.25-15 mg/5 mL Syrp Take 5 mLs by mouth 4 (four) times daily as needed. 118 mL 0 4/11/2019 at Unknown time    SPIRIVA WITH HANDIHALER 18 mcg inhalation capsule INHALE 1 CAPSULE WITH INHALATION DEVICE ONCE DAILY 30 capsule 6 4/12/2019 at Unknown time    albuterol-ipratropium (DUO-NEB) 2.5 mg-0.5 mg/3 mL nebulizer solution INHALE 1 VIAL VIA NEBULIZER EVERY 6 HOURS AS NEEDED 90 vial 3     amitriptyline (ELAVIL) 25 MG tablet TAKE 1 TABLET BY MOUTH EVERY DAY 30 tablet 6 Taking    BETA-CAROTENE,A, W-C & E/MIN (OCUVITE ORAL) Take by mouth once daily.    Taking    DALIRESP 500 mcg Tab TAKE 1 TABLET BY MOUTH EVERY DAY 30 tablet 6 Taking    echinacea 400 mg Cap Take by mouth once daily.    Taking    fluticasone (FLONASE) 50 mcg/actuation nasal spray INSTILL 1 SPRAY IN EACH NOSTRIL ONCE DAILY. 16 mL 3 Taking    latanoprost 0.005 % ophthalmic solution Place 1 drop into both eyes once daily.    Taking    nitroGLYCERIN (NITROSTAT) 0.4 MG SL tablet Place 1 tablet (0.4 mg total) under the tongue every 5 (five) minutes as needed. 25 tablet 3 Taking    ondansetron (ZOFRAN-ODT) 4 MG TbDL Take 1 tablet (4 mg total) by  mouth every 6 (six) hours as needed (nausea). 10 tablet 0 Unknown at Unknown time    polyethylene glycol (GOLYTELY,NULYTELY) 236-22.74-6.74 -5.86 gram suspension As directed 4000 mL 0 Taking    predniSONE (DELTASONE) 5 MG tablet TAKE 1 TABLET BY MOUTH EVERY OTHER DAY. TAKE WITH FOOD. 30 tablet 6 Taking     Review of patient's allergies indicates:   Allergen Reactions    Brilinta [ticagrelor] Itching    Lisinopril      Other reaction(s): cough    Metoprolol succinate Rash        Past Medical History:   Diagnosis Date    Benign neoplasm of colon 10/1/2013    Bronchitis chronic     CAD (coronary artery disease) 10/31/2013    COPD (chronic obstructive pulmonary disease)     Emphysema of lung     GERD (gastroesophageal reflux disease)     Hx of colonic polyps     Hypertension     NAFLD (nonalcoholic fatty liver disease) 3/27/2017    SHOLA on CPAP     RLS (restless legs syndrome)     Screen for colon cancer 8/30/2013      Past Surgical History:   Procedure Laterality Date    bilateral foot surgery  1993    CARDIAC CATHETERIZATION  2013    x1    CATARACT EXTRACTION, BILATERAL      COLON SURGERY  2013    Ace Mott MD    COLONOSCOPY N/A 3/21/2018    [facility] (4TH FLR)    COLONOSCOPY N/A 8/30/2013    [facility] at [facility] (4TH FLR)    COLONOSCOPY N/A 7/12/2013    [facility] at [facility] (4TH FLR)    HEMICOLECTOMY, RIGHT N/A 10/1/2013    [facility] Terry [facility]ott [facility] at [facility] OR 2ND FLR    scrotal abscess removal        Family History   Problem Relation Age of Onset    Heart disease Mother     Heart attack Mother     Hypertension Mother     Asthma Neg Hx     Emphysema Neg Hx     Prostate cancer Neg Hx     Cirrhosis Neg Hx       Social History     Tobacco Use    Smoking status: Former Smoker     Packs/day: 1.00     Years: 40.00     Pack years: 40.00     Types: Cigarettes     Last attempt to quit: 8/15/2012  "    Years since quittin.6    Smokeless tobacco: Never Used   Substance Use Topics    Alcohol use: Yes     Comment: social use only        Review of Systems:  A comprehensive review of systems was negative.    OBJECTIVE:     Patient Vitals for the past 8 hrs:   BP Temp Temp src Pulse Resp SpO2 Height Weight   19 0731 (!) 138/92 98.6 °F (37 °C) Temporal 100 18 95 % 5' 10" (1.778 m) 108.9 kg (240 lb)     AAOx3  CTA B  Sinus  Soft/nt/nd  2+ pulses B        \    ASSESSMENT/PLAN:     Active Hospital Problems    Diagnosis  POA    Personal history of colonic polyps [Z86.010]  Not Applicable      Resolved Hospital Problems   No resolved problems to display.       Plan:  To have surveillance colonoscopy today  "

## 2019-04-12 NOTE — ANESTHESIA POSTPROCEDURE EVALUATION
Anesthesia Post Evaluation    Patient: Jordin Allen JrGreg    Procedure(s) Performed: Procedure(s) (LRB):  COLONOSCOPY (N/A)    Final Anesthesia Type: general  Patient location during evaluation: GI PACU  Patient participation: Yes- Able to Participate  Level of consciousness: awake and alert, awake and oriented  Post-procedure vital signs: reviewed and stable  Pain management: adequate  Airway patency: patent  PONV status at discharge: No PONV  Anesthetic complications: no      Cardiovascular status: blood pressure returned to baseline, stable and hemodynamically stable  Respiratory status: unassisted, spontaneous ventilation and room air  Hydration status: euvolemic  Follow-up not needed.          Vitals Value Taken Time   /81 4/12/2019  8:47 AM   Temp 36.7 °C (98.1 °F) 4/12/2019  8:47 AM   Pulse 82 4/12/2019  8:47 AM   Resp 18 4/12/2019  8:47 AM   SpO2 95 % 4/12/2019  8:47 AM         No case tracking events are documented in the log.      Pain/Shelley Score: No data recorded

## 2019-04-16 DIAGNOSIS — J43.2 CENTRILOBULAR EMPHYSEMA: Primary | ICD-10-CM

## 2019-04-16 DIAGNOSIS — J44.1 CHRONIC OBSTRUCTIVE PULMONARY DISEASE WITH ACUTE EXACERBATION: ICD-10-CM

## 2019-04-17 ENCOUNTER — PATIENT MESSAGE (OUTPATIENT)
Dept: PULMONOLOGY | Facility: CLINIC | Age: 74
End: 2019-04-17

## 2019-04-17 RX ORDER — ROFLUMILAST 500 UG/1
TABLET ORAL
Qty: 30 TABLET | Refills: 11 | Status: SHIPPED | OUTPATIENT
Start: 2019-04-17 | End: 2020-04-06

## 2019-04-24 ENCOUNTER — TELEPHONE (OUTPATIENT)
Dept: SURGERY | Facility: CLINIC | Age: 74
End: 2019-04-24

## 2019-04-24 NOTE — TELEPHONE ENCOUNTER
Attempted to call pt.  NO answer.  Left voice message to have pt call office.     Will have office staff reach out to pt.       FINAL PATHOLOGIC DIAGNOSIS  1. TUBULAR ADENOMA  2. HYPERPLASTIC POLYPS    Pt will have repeat cscope in 3 years

## 2019-04-30 ENCOUNTER — EXTERNAL CHRONIC CARE MANAGEMENT (OUTPATIENT)
Dept: PRIMARY CARE CLINIC | Facility: CLINIC | Age: 74
End: 2019-04-30
Payer: MEDICARE

## 2019-04-30 PROCEDURE — 99490 CHRNC CARE MGMT STAFF 1ST 20: CPT | Mod: S$PBB,,, | Performed by: INTERNAL MEDICINE

## 2019-04-30 PROCEDURE — 99490 CHRNC CARE MGMT STAFF 1ST 20: CPT | Mod: PBBFAC,PO | Performed by: INTERNAL MEDICINE

## 2019-04-30 PROCEDURE — 99490 PR CHRONIC CARE MGMT, 1ST 20 MIN: ICD-10-PCS | Mod: S$PBB,,, | Performed by: INTERNAL MEDICINE

## 2019-05-31 ENCOUNTER — EXTERNAL CHRONIC CARE MANAGEMENT (OUTPATIENT)
Dept: PRIMARY CARE CLINIC | Facility: CLINIC | Age: 74
End: 2019-05-31
Payer: MEDICARE

## 2019-05-31 PROCEDURE — 99490 PR CHRONIC CARE MGMT, 1ST 20 MIN: ICD-10-PCS | Mod: S$PBB,,, | Performed by: INTERNAL MEDICINE

## 2019-05-31 PROCEDURE — 99490 CHRNC CARE MGMT STAFF 1ST 20: CPT | Mod: S$PBB,,, | Performed by: INTERNAL MEDICINE

## 2019-05-31 PROCEDURE — 99490 CHRNC CARE MGMT STAFF 1ST 20: CPT | Mod: PBBFAC,PO | Performed by: INTERNAL MEDICINE

## 2019-06-03 RX ORDER — ATORVASTATIN CALCIUM 80 MG/1
TABLET, FILM COATED ORAL
Qty: 90 TABLET | Refills: 3 | Status: SHIPPED | OUTPATIENT
Start: 2019-06-03 | End: 2020-05-21 | Stop reason: SDUPTHER

## 2019-06-30 ENCOUNTER — EXTERNAL CHRONIC CARE MANAGEMENT (OUTPATIENT)
Dept: PRIMARY CARE CLINIC | Facility: CLINIC | Age: 74
End: 2019-06-30
Payer: MEDICARE

## 2019-06-30 PROCEDURE — 99490 PR CHRONIC CARE MGMT, 1ST 20 MIN: ICD-10-PCS | Mod: S$PBB,,, | Performed by: INTERNAL MEDICINE

## 2019-06-30 PROCEDURE — 99490 CHRNC CARE MGMT STAFF 1ST 20: CPT | Mod: S$PBB,,, | Performed by: INTERNAL MEDICINE

## 2019-06-30 PROCEDURE — 99490 CHRNC CARE MGMT STAFF 1ST 20: CPT | Mod: PBBFAC,PO | Performed by: INTERNAL MEDICINE

## 2019-07-31 ENCOUNTER — EXTERNAL CHRONIC CARE MANAGEMENT (OUTPATIENT)
Dept: PRIMARY CARE CLINIC | Facility: CLINIC | Age: 74
End: 2019-07-31
Payer: MEDICARE

## 2019-07-31 PROCEDURE — 99490 PR CHRONIC CARE MGMT, 1ST 20 MIN: ICD-10-PCS | Mod: S$PBB,,, | Performed by: INTERNAL MEDICINE

## 2019-07-31 PROCEDURE — 99490 CHRNC CARE MGMT STAFF 1ST 20: CPT | Mod: S$PBB,,, | Performed by: INTERNAL MEDICINE

## 2019-07-31 PROCEDURE — 99490 CHRNC CARE MGMT STAFF 1ST 20: CPT | Mod: PBBFAC,PO | Performed by: INTERNAL MEDICINE

## 2019-08-02 RX ORDER — AMITRIPTYLINE HYDROCHLORIDE 25 MG/1
TABLET, FILM COATED ORAL
Qty: 90 TABLET | Refills: 2 | OUTPATIENT
Start: 2019-08-02

## 2019-08-07 RX ORDER — AMITRIPTYLINE HYDROCHLORIDE 25 MG/1
25 TABLET, FILM COATED ORAL DAILY
Qty: 30 TABLET | Refills: 6 | Status: SHIPPED | OUTPATIENT
Start: 2019-08-07 | End: 2020-01-31

## 2019-08-28 ENCOUNTER — CLINICAL SUPPORT (OUTPATIENT)
Dept: AUDIOLOGY | Facility: CLINIC | Age: 74
End: 2019-08-28
Payer: MEDICARE

## 2019-08-28 ENCOUNTER — CLINICAL SUPPORT (OUTPATIENT)
Dept: AUDIOLOGY | Facility: CLINIC | Age: 74
End: 2019-08-28

## 2019-08-28 DIAGNOSIS — H90.A32 MIXED CONDUCTIVE AND SENSORINEURAL HEARING LOSS OF LEFT EAR WITH RESTRICTED HEARING OF RIGHT EAR: Primary | ICD-10-CM

## 2019-08-28 PROCEDURE — 92557 COMPREHENSIVE HEARING TEST: CPT | Mod: PBBFAC | Performed by: AUDIOLOGIST-HEARING AID FITTER

## 2019-08-28 PROCEDURE — 99211 OFF/OP EST MAY X REQ PHY/QHP: CPT | Mod: PBBFAC

## 2019-08-28 PROCEDURE — 99499 NO LOS: ICD-10-PCS | Mod: S$GLB,,, | Performed by: OTOLARYNGOLOGY

## 2019-08-28 PROCEDURE — 92567 TYMPANOMETRY: CPT | Mod: 52,PBBFAC | Performed by: AUDIOLOGIST-HEARING AID FITTER

## 2019-08-28 PROCEDURE — 99499 UNLISTED E&M SERVICE: CPT | Mod: S$GLB,,, | Performed by: OTOLARYNGOLOGY

## 2019-08-28 PROCEDURE — 99999 PR PBB SHADOW E&M-EST. PATIENT-LVL I: ICD-10-PCS | Mod: PBBFAC,,,

## 2019-08-28 PROCEDURE — 99999 PR PBB SHADOW E&M-EST. PATIENT-LVL I: CPT | Mod: PBBFAC,,,

## 2019-08-28 NOTE — PROGRESS NOTES
Jordin Allen was seen in the clinic today for a hearing aid follow-up.    Mr. Allen reported the right hearing aid was not working. I was unable to get the hearing aid working in the office so it will be sent off for repair. He stated he felt like his left tube was plugged. A mixed component was noted on his audiogram with a flat tympanogram and small left ear canal volume. The left hearing aid was reprogrammed based on the results of today's hearing test. He has an appointment scheduled with Dr. Trejo for a tube check. I will recheck his left ear and reprogram the left hearing aid as needed following his appointment with Dr. Trejo.     I will call Mr. Allen when his right hearing aid comes in for repair and set up an appointment for him to return to the clinic to  the hearing aid.

## 2019-08-28 NOTE — PROGRESS NOTES
Jordin Allen was seen in the clinic today for an annual hearing evaluation. Mr. Allen reported hearing loss worse in his left ear. He stated he felt like his left tube was blocked. He currently wears bilateral hearing aids he purchased in 2017.     Otoscopy was unremarkable. Audiological testing revealed a mild to moderate sensorineural hearing loss in the right ear and a mild to severe mixed hearing loss in the left ear. A speech reception threshold was obtained at 40 dBHL in the right ear and 55 dBHL in the left ear. Speech recognition was 84% in the right ear and 96% in the left ear.    Tympanometry revealed a flat Type B tympanogram with small ear canal volume in the left ear.    Recommendations:  1. Otologic evaluation  2. Annual hearing evaluation- follow up left ear audiogram following tube check  3. Continued use of hearing aids

## 2019-08-30 ENCOUNTER — CLINICAL SUPPORT (OUTPATIENT)
Dept: AUDIOLOGY | Facility: CLINIC | Age: 74
End: 2019-08-30

## 2019-08-30 ENCOUNTER — OFFICE VISIT (OUTPATIENT)
Dept: OTOLARYNGOLOGY | Facility: CLINIC | Age: 74
End: 2019-08-30
Payer: MEDICARE

## 2019-08-30 DIAGNOSIS — H90.A32 MIXED CONDUCTIVE AND SENSORINEURAL HEARING LOSS OF LEFT EAR WITH RESTRICTED HEARING OF RIGHT EAR: Primary | ICD-10-CM

## 2019-08-30 DIAGNOSIS — H65.92 LEFT OTITIS MEDIA WITH EFFUSION: Primary | ICD-10-CM

## 2019-08-30 PROCEDURE — 69433 PR CREATE EARDRUM OPENING,LOCAL ANESTH: ICD-10-PCS | Mod: S$PBB,LT,, | Performed by: OTOLARYNGOLOGY

## 2019-08-30 PROCEDURE — 69433 CREATE EARDRUM OPENING: CPT | Mod: S$PBB,LT,, | Performed by: OTOLARYNGOLOGY

## 2019-08-30 PROCEDURE — 99499 NO LOS: ICD-10-PCS | Mod: S$GLB,,, | Performed by: OTOLARYNGOLOGY

## 2019-08-30 PROCEDURE — 99499 UNLISTED E&M SERVICE: CPT | Mod: S$GLB,,, | Performed by: OTOLARYNGOLOGY

## 2019-08-30 PROCEDURE — 69433 CREATE EARDRUM OPENING: CPT | Mod: PBBFAC | Performed by: OTOLARYNGOLOGY

## 2019-08-30 NOTE — PROGRESS NOTES
Here for re check.    Left ear blocked again.    Exam: L OME.    After informed consent regarding infection, perforation, early extrusion and possible cholesteatoma formation the patient was brought to the minor procedure room and placed in the supine position. The operating microscope was then brought onto the field and the Left tympanic membrane was visualized. Using a sterile, single use phenol kit the TM was sterilized and anesthetized. A myringotomy incision was made and the effusion evacuated. A sterile Mann V tympanostomy tube was placed and the procedure was terminated. The patient tolerated the procedure well.      Water precautions discussed. RTC as directed.

## 2019-08-30 NOTE — PROGRESS NOTES
Jordin Allen was seen in the clinic today for a hearing aid follow-up following an appointment with Dr. Trejo.    Dr. Trejo replaced the tube in his left ear. Mr. Allen reported the hearing aid was not too loud following the tube placement. No adjustments were made. We will call Mr. Allen to set up a follow-up appointment when his right hearing aid comes back from repair.

## 2019-08-31 ENCOUNTER — PATIENT MESSAGE (OUTPATIENT)
Dept: PULMONOLOGY | Facility: CLINIC | Age: 74
End: 2019-08-31

## 2019-09-03 ENCOUNTER — TELEPHONE (OUTPATIENT)
Dept: INTERNAL MEDICINE | Facility: CLINIC | Age: 74
End: 2019-09-03

## 2019-09-03 DIAGNOSIS — J43.2 CENTRILOBULAR EMPHYSEMA: ICD-10-CM

## 2019-09-03 DIAGNOSIS — Z12.5 SCREENING PSA (PROSTATE SPECIFIC ANTIGEN): Primary | ICD-10-CM

## 2019-09-03 DIAGNOSIS — I10 HTN (HYPERTENSION), BENIGN: ICD-10-CM

## 2019-09-03 RX ORDER — ALBUTEROL SULFATE 90 UG/1
2 AEROSOL, METERED RESPIRATORY (INHALATION) EVERY 4 HOURS PRN
Qty: 1 EACH | Refills: 12 | Status: CANCELLED | OUTPATIENT
Start: 2019-09-03

## 2019-09-05 ENCOUNTER — TELEPHONE (OUTPATIENT)
Dept: AUDIOLOGY | Facility: CLINIC | Age: 74
End: 2019-09-05

## 2019-09-06 ENCOUNTER — IMMUNIZATION (OUTPATIENT)
Dept: PHARMACY | Facility: CLINIC | Age: 74
End: 2019-09-06
Payer: MEDICARE

## 2019-09-06 ENCOUNTER — CLINICAL SUPPORT (OUTPATIENT)
Dept: AUDIOLOGY | Facility: CLINIC | Age: 74
End: 2019-09-06
Payer: MEDICARE

## 2019-09-06 DIAGNOSIS — J43.2 CENTRILOBULAR EMPHYSEMA: ICD-10-CM

## 2019-09-06 DIAGNOSIS — H90.3 SENSORINEURAL HEARING LOSS, BILATERAL: Primary | ICD-10-CM

## 2019-09-06 PROCEDURE — 99499 NO LOS: ICD-10-PCS | Mod: S$GLB,,, | Performed by: AUDIOLOGIST-HEARING AID FITTER

## 2019-09-06 PROCEDURE — 99499 UNLISTED E&M SERVICE: CPT | Mod: S$GLB,,, | Performed by: AUDIOLOGIST-HEARING AID FITTER

## 2019-09-06 NOTE — PROGRESS NOTES
Jordin Allen was seen in the clinic today to  his repaired right hearing aid.     The hearing aids were programmed based on the results of Mr. Allen's most recent hearing test. He was pleased with how the hearing aids sounded.     Mr. Allen will call the clinic for a follow-up appointment as needed. A one year appointment reminder will be put in to the Epic system. Mr. Allen's warranty expires in September 2020. We will send the hearing aids off for a clean and check when he returns for his annual follow-up appointment.

## 2019-09-08 RX ORDER — TIOTROPIUM BROMIDE 18 UG/1
CAPSULE ORAL; RESPIRATORY (INHALATION)
Qty: 30 CAPSULE | Refills: 6 | Status: SHIPPED | OUTPATIENT
Start: 2019-09-08 | End: 2020-02-12

## 2019-09-08 RX ORDER — ALBUTEROL SULFATE 90 UG/1
2 AEROSOL, METERED RESPIRATORY (INHALATION) EVERY 4 HOURS PRN
Qty: 1 EACH | Refills: 12 | Status: SHIPPED | OUTPATIENT
Start: 2019-09-08 | End: 2021-07-18 | Stop reason: SDUPTHER

## 2019-09-24 RX ORDER — HYDROCHLOROTHIAZIDE 25 MG/1
12.5 TABLET ORAL DAILY
Qty: 30 TABLET | Refills: 6 | Status: SHIPPED | OUTPATIENT
Start: 2019-09-24 | End: 2020-10-09 | Stop reason: SDUPTHER

## 2019-09-30 ENCOUNTER — EXTERNAL CHRONIC CARE MANAGEMENT (OUTPATIENT)
Dept: PRIMARY CARE CLINIC | Facility: CLINIC | Age: 74
End: 2019-09-30
Payer: MEDICARE

## 2019-09-30 PROCEDURE — 99490 CHRNC CARE MGMT STAFF 1ST 20: CPT | Mod: S$PBB,,, | Performed by: INTERNAL MEDICINE

## 2019-09-30 PROCEDURE — 99490 CHRNC CARE MGMT STAFF 1ST 20: CPT | Mod: PBBFAC,PO | Performed by: INTERNAL MEDICINE

## 2019-09-30 PROCEDURE — 99490 PR CHRONIC CARE MGMT, 1ST 20 MIN: ICD-10-PCS | Mod: S$PBB,,, | Performed by: INTERNAL MEDICINE

## 2019-10-03 RX ORDER — LOSARTAN POTASSIUM 100 MG/1
TABLET ORAL
Qty: 90 TABLET | Refills: 2 | Status: SHIPPED | OUTPATIENT
Start: 2019-10-03 | End: 2020-06-17 | Stop reason: SDUPTHER

## 2019-10-11 ENCOUNTER — PATIENT MESSAGE (OUTPATIENT)
Dept: PULMONOLOGY | Facility: CLINIC | Age: 74
End: 2019-10-11

## 2019-10-11 DIAGNOSIS — J31.0 RHINITIS, UNSPECIFIED TYPE: Primary | ICD-10-CM

## 2019-10-11 RX ORDER — FLUTICASONE PROPIONATE 50 MCG
1 SPRAY, SUSPENSION (ML) NASAL DAILY
Qty: 16 ML | Refills: 3 | Status: SHIPPED | OUTPATIENT
Start: 2019-10-11 | End: 2020-04-21

## 2019-10-30 NOTE — TELEPHONE ENCOUNTER
"Spoke with Frank, [facility], who advises that they have no orders for home O2.    As per pt, he was prescribed home O2 "a while ago".    After review of the pt's chart as far back at ED in April 2017, no mention of d/c with home O2.    After conferring with dr. Mccain, "until further clarification. Home O2, 2L PRN SOB".    Frank advised.    Verbalized understanding.  "
----- Message from Shelbie Fishman sent at 1/23/2019  2:47 PM CST -----  Contact: Frank/[facility]/[phone]  Pt admitted pt for home health and wound care. She seen that the pt is on oxygen at home and trying top figure out what liters is the pt supposed to be on. Please advise.        Thanks   
Capillary refill less/equal to 2 seconds/Strong peripheral pulses

## 2019-10-31 ENCOUNTER — EXTERNAL CHRONIC CARE MANAGEMENT (OUTPATIENT)
Dept: PRIMARY CARE CLINIC | Facility: CLINIC | Age: 74
End: 2019-10-31
Payer: MEDICARE

## 2019-10-31 ENCOUNTER — LAB VISIT (OUTPATIENT)
Dept: LAB | Facility: HOSPITAL | Age: 74
End: 2019-10-31
Attending: INTERNAL MEDICINE
Payer: MEDICARE

## 2019-10-31 DIAGNOSIS — I10 HTN (HYPERTENSION), BENIGN: ICD-10-CM

## 2019-10-31 LAB
AMORPH CRY UR QL COMP ASSIST: NORMAL
BILIRUB UR QL STRIP: NEGATIVE
CLARITY UR REFRACT.AUTO: ABNORMAL
COLOR UR AUTO: YELLOW
GLUCOSE UR QL STRIP: NEGATIVE
HGB UR QL STRIP: NEGATIVE
KETONES UR QL STRIP: NEGATIVE
LEUKOCYTE ESTERASE UR QL STRIP: NEGATIVE
MICROSCOPIC COMMENT: NORMAL
NITRITE UR QL STRIP: NEGATIVE
PH UR STRIP: 5 [PH] (ref 5–8)
PROT UR QL STRIP: NEGATIVE
RBC #/AREA URNS AUTO: 1 /HPF (ref 0–4)
SP GR UR STRIP: 1.02 (ref 1–1.03)
URN SPEC COLLECT METH UR: ABNORMAL

## 2019-10-31 PROCEDURE — 99490 PR CHRONIC CARE MGMT, 1ST 20 MIN: ICD-10-PCS | Mod: S$PBB,,, | Performed by: INTERNAL MEDICINE

## 2019-10-31 PROCEDURE — 81001 URINALYSIS AUTO W/SCOPE: CPT

## 2019-10-31 PROCEDURE — 99490 CHRNC CARE MGMT STAFF 1ST 20: CPT | Mod: S$PBB,,, | Performed by: INTERNAL MEDICINE

## 2019-10-31 PROCEDURE — 99490 CHRNC CARE MGMT STAFF 1ST 20: CPT | Mod: PBBFAC,PO | Performed by: INTERNAL MEDICINE

## 2019-11-07 ENCOUNTER — OFFICE VISIT (OUTPATIENT)
Dept: INTERNAL MEDICINE | Facility: CLINIC | Age: 74
End: 2019-11-07
Payer: MEDICARE

## 2019-11-07 VITALS
HEART RATE: 80 BPM | WEIGHT: 245.81 LBS | TEMPERATURE: 98 F | DIASTOLIC BLOOD PRESSURE: 86 MMHG | RESPIRATION RATE: 20 BRPM | SYSTOLIC BLOOD PRESSURE: 142 MMHG | HEIGHT: 70 IN | BODY MASS INDEX: 35.19 KG/M2

## 2019-11-07 DIAGNOSIS — I25.10 CORONARY ARTERY DISEASE INVOLVING NATIVE CORONARY ARTERY OF NATIVE HEART, ANGINA PRESENCE UNSPECIFIED: Chronic | ICD-10-CM

## 2019-11-07 DIAGNOSIS — I10 HTN (HYPERTENSION), BENIGN: Primary | ICD-10-CM

## 2019-11-07 DIAGNOSIS — G47.33 OSA (OBSTRUCTIVE SLEEP APNEA): ICD-10-CM

## 2019-11-07 DIAGNOSIS — J43.2 CENTRILOBULAR EMPHYSEMA: Chronic | ICD-10-CM

## 2019-11-07 DIAGNOSIS — K21.9 GASTROESOPHAGEAL REFLUX DISEASE WITHOUT ESOPHAGITIS: ICD-10-CM

## 2019-11-07 DIAGNOSIS — Z12.9 SCREENING FOR CANCER: ICD-10-CM

## 2019-11-07 DIAGNOSIS — E78.5 DYSLIPIDEMIA: ICD-10-CM

## 2019-11-07 DIAGNOSIS — K76.0 NAFLD (NONALCOHOLIC FATTY LIVER DISEASE): ICD-10-CM

## 2019-11-07 DIAGNOSIS — Z87.891 HX OF TOBACCO USE, PRESENTING HAZARDS TO HEALTH: ICD-10-CM

## 2019-11-07 PROCEDURE — 99999 PR PBB SHADOW E&M-EST. PATIENT-LVL III: CPT | Mod: PBBFAC,GC,, | Performed by: STUDENT IN AN ORGANIZED HEALTH CARE EDUCATION/TRAINING PROGRAM

## 2019-11-07 PROCEDURE — 99213 OFFICE O/P EST LOW 20 MIN: CPT | Mod: PBBFAC,PO | Performed by: STUDENT IN AN ORGANIZED HEALTH CARE EDUCATION/TRAINING PROGRAM

## 2019-11-07 PROCEDURE — 99999 PR PBB SHADOW E&M-EST. PATIENT-LVL III: ICD-10-PCS | Mod: PBBFAC,GC,, | Performed by: STUDENT IN AN ORGANIZED HEALTH CARE EDUCATION/TRAINING PROGRAM

## 2019-11-07 PROCEDURE — 99214 OFFICE O/P EST MOD 30 MIN: CPT | Mod: S$PBB,GC,, | Performed by: STUDENT IN AN ORGANIZED HEALTH CARE EDUCATION/TRAINING PROGRAM

## 2019-11-07 PROCEDURE — 99214 PR OFFICE/OUTPT VISIT, EST, LEVL IV, 30-39 MIN: ICD-10-PCS | Mod: S$PBB,GC,, | Performed by: STUDENT IN AN ORGANIZED HEALTH CARE EDUCATION/TRAINING PROGRAM

## 2019-11-07 NOTE — PROGRESS NOTES
Subjective:       Patient ID: Jordin Allen Jr. is a 74 y.o. male.    Chief Complaint: Annual Exam    Mr. Allen is a 74 year old male with CAD, COPD, HTN, and HLD that presents for annual exam.    No significant medical changes since last visit  Most recent labs from 10/31 significant for stable normocytic anemia, normal CMP, low-normal TSH, PSA wnl      CAD - MI s/p stent x 1 (2013)  On aspirin, has never used NTG  Denies CP, SOB, palpitations    COPD - on Symbicort + Spiriva for maintenance  PRN albuterol ~1 week  Sees Dr. Mohr in pulmonology  No recent hospitalizations for exacerbation  SOB on exertion (at his baseline, no worsening)    HTN - BP 140s/80s x 2 today  Currently on HCTZ 25mg + losartan 100mg    HLD - well-controlled  Lipid panel from 10/31 showed cholesterol + LDL wnl  Currently on atorvastatin 80mg    Health Maintenance:  CRC screening = colonoscopy in 3/2019, f/u in 3 years  [place] [facility] [place] chest = due  Tetanus = no record  Pneumococcal vaccine = up to date    Review of Systems   Constitutional: Negative for chills and fever.   HENT: Positive for hearing loss (chronic, wears hearing aids). Negative for congestion, sore throat and trouble swallowing.    Eyes: Negative for visual disturbance.   Respiratory: Positive for shortness of breath (on exertion, at baseline). Negative for cough and wheezing.    Cardiovascular: Negative for chest pain, palpitations and leg swelling.   Gastrointestinal: Negative for abdominal pain, blood in stool, constipation, diarrhea, nausea and vomiting.   Genitourinary: Negative for dysuria and hematuria.   Musculoskeletal: Negative for arthralgias and myalgias.   Skin: Negative for rash.   Neurological: Negative for dizziness, light-headedness, numbness and headaches.   Psychiatric/Behavioral: Negative for agitation and confusion.       Objective:      Physical Exam   Constitutional: He appears well-developed and well-nourished. No distress.    HENT:   Head: Normocephalic and atraumatic.   Mouth/Throat: Oropharynx is clear and moist. No oropharyngeal exudate.   Eyes: Pupils are equal, round, and reactive to light. Conjunctivae and EOM are normal.   Neck: Normal range of motion. Neck supple.   Cardiovascular: Normal rate, regular rhythm and normal heart sounds.   No murmur heard.  Pulmonary/Chest: Effort normal and breath sounds normal. No respiratory distress. He has no wheezes. He has no rales.   Abdominal: Soft. Bowel sounds are normal. He exhibits no distension. There is no tenderness. There is no guarding.   Musculoskeletal: He exhibits no edema or tenderness.   Neurological: He is alert. He displays normal reflexes. He exhibits normal muscle tone.   Skin: Skin is warm and dry. No rash noted.   Psychiatric: He has a normal mood and affect. His behavior is normal.       Assessment/Plan:       HTN (hypertension), benign  BP 140s/80s on 2 readings today  Takes BP at home  Patient is going to document BP daily until appt with Cardiology in December  Continue losartan 100mg + HCTZ 25mg   Defer changes to cardiology  If change in BP meds, follow up with Dr Mccain approx 1 month after    Dyslipidemia  Stable, continue atorvastatin 80mg     Centrilobular emphysema  Stable, continue Symbicort + Spiriva for maintenance w/ PRN albuterol    Coronary artery disease involving native coronary artery of native heart, angina presence unspecified  Stable, continue aspirin    SHOLA (obstructive sleep apnea)  Stable, continue CPAP at night    NAFLD (nonalcoholic fatty liver disease)  LFTs wnl on last CMP  Avoid hepatotoxic agents  Continue to monitor    Gastroesophageal reflux disease without esophagitis  Stable, continue PPI    Screening for cancer    Hx of tobacco use, presenting hazards to health  CT Chest Lung Screening Low Dose      Follow-up 1 month after cardiology visit if change in BP meds  Otherwise, follow-up in 1 year      Jordin Bolanos MD  Medical  Resident  [facility]

## 2019-11-08 ENCOUNTER — HOSPITAL ENCOUNTER (OUTPATIENT)
Dept: RADIOLOGY | Facility: HOSPITAL | Age: 74
Discharge: HOME | End: 2019-11-08
Attending: INTERNAL MEDICINE
Payer: MEDICARE

## 2019-11-08 DIAGNOSIS — Z87.891 HX OF TOBACCO USE, PRESENTING HAZARDS TO HEALTH: ICD-10-CM

## 2019-11-08 PROCEDURE — G0297 LDCT FOR LUNG CA SCREEN: HCPCS | Mod: 26,,, | Performed by: RADIOLOGY

## 2019-11-08 PROCEDURE — G0297 CT CHEST LUNG SCREENING LOW DOSE: ICD-10-PCS | Mod: 26,,, | Performed by: RADIOLOGY

## 2019-11-08 PROCEDURE — G0297 LDCT FOR LUNG CA SCREEN: HCPCS | Mod: TC

## 2019-11-13 ENCOUNTER — DOCUMENTATION ONLY (OUTPATIENT)
Dept: AUDIOLOGY | Facility: CLINIC | Age: 74
End: 2019-11-13

## 2019-11-13 NOTE — PROGRESS NOTES
Patient sending his hearing aids and  for repair.  Hearing aids are intermittent and distorted.  He requested to see Paloma when hearing aids come in so he's scheduled for 11/25/19.      Niurka Garcia B70-R  [employer]  Invoice Date: 6/26/17   Lt SN [id number]  Rt SN [id number]  : 2xS  Dome: [employer]  Warranty Exp 9/23/20

## 2019-11-14 ENCOUNTER — IMMUNIZATION (OUTPATIENT)
Dept: PHARMACY | Facility: CLINIC | Age: 74
End: 2019-11-14
Payer: MEDICARE

## 2019-11-22 ENCOUNTER — DOCUMENTATION ONLY (OUTPATIENT)
Dept: AUDIOLOGY | Facility: CLINIC | Age: 74
End: 2019-11-22

## 2019-11-22 DIAGNOSIS — K21.9 GASTROESOPHAGEAL REFLUX DISEASE WITHOUT ESOPHAGITIS: ICD-10-CM

## 2019-11-22 NOTE — PROGRESS NOTES
Received in warranty repair from [employer]    Action Taken:  Replaced Electronics, Housing,  and dome.  Replaced .    Niurka Garcia B70-R  [employer]  Invoice Date: 6/26/17   Lt SN [id number]  Rt SN [id number]  : 2xS  Dome: [employer]  Warranty Exp 9/23/20     SN [id number]

## 2019-11-23 RX ORDER — OMEPRAZOLE 20 MG/1
20 CAPSULE, DELAYED RELEASE ORAL DAILY
Qty: 90 CAPSULE | Refills: 3 | Status: SHIPPED | OUTPATIENT
Start: 2019-11-23 | End: 2020-11-03

## 2019-11-25 ENCOUNTER — CLINICAL SUPPORT (OUTPATIENT)
Dept: AUDIOLOGY | Facility: CLINIC | Age: 74
End: 2019-11-25
Payer: MEDICARE

## 2019-11-25 DIAGNOSIS — H90.3 SENSORINEURAL HEARING LOSS, BILATERAL: Primary | ICD-10-CM

## 2019-11-25 NOTE — PROGRESS NOTES
Jordin Allen was seen in the clinic today to  his repaired hearing aids.    The fitting formula was changed to NAL-NL2. MPO was increased. Mr. Allen was pleased with how the hearing aids sounded. He was given a dry cap to try in his  to help with moisture issues.     Mr. Allen will call the clinic for a follow-up appointment as needed.

## 2019-11-26 ENCOUNTER — PATIENT OUTREACH (OUTPATIENT)
Dept: ADMINISTRATIVE | Facility: OTHER | Age: 74
End: 2019-11-26

## 2019-11-27 ENCOUNTER — OFFICE VISIT (OUTPATIENT)
Dept: OTOLARYNGOLOGY | Facility: CLINIC | Age: 74
End: 2019-11-27
Payer: MEDICARE

## 2019-11-27 VITALS — WEIGHT: 244.94 LBS | HEIGHT: 70 IN | BODY MASS INDEX: 35.07 KG/M2

## 2019-11-27 DIAGNOSIS — H65.92 LEFT OTITIS MEDIA WITH EFFUSION: Primary | ICD-10-CM

## 2019-11-27 PROCEDURE — 99499 NO LOS: ICD-10-PCS | Mod: S$PBB,,, | Performed by: OTOLARYNGOLOGY

## 2019-11-27 PROCEDURE — 69433 PR CREATE EARDRUM OPENING,LOCAL ANESTH: ICD-10-PCS | Mod: S$PBB,LT,, | Performed by: OTOLARYNGOLOGY

## 2019-11-27 PROCEDURE — 99213 OFFICE O/P EST LOW 20 MIN: CPT | Mod: PBBFAC | Performed by: OTOLARYNGOLOGY

## 2019-11-27 PROCEDURE — 69433 CREATE EARDRUM OPENING: CPT | Mod: PBBFAC | Performed by: OTOLARYNGOLOGY

## 2019-11-27 PROCEDURE — 69433 CREATE EARDRUM OPENING: CPT | Mod: S$PBB,LT,, | Performed by: OTOLARYNGOLOGY

## 2019-11-27 PROCEDURE — 99999 PR PBB SHADOW E&M-EST. PATIENT-LVL III: ICD-10-PCS | Mod: PBBFAC,,, | Performed by: OTOLARYNGOLOGY

## 2019-11-27 PROCEDURE — 99999 PR PBB SHADOW E&M-EST. PATIENT-LVL III: CPT | Mod: PBBFAC,,, | Performed by: OTOLARYNGOLOGY

## 2019-11-27 PROCEDURE — 99499 UNLISTED E&M SERVICE: CPT | Mod: S$PBB,,, | Performed by: OTOLARYNGOLOGY

## 2019-11-30 ENCOUNTER — EXTERNAL CHRONIC CARE MANAGEMENT (OUTPATIENT)
Dept: PRIMARY CARE CLINIC | Facility: CLINIC | Age: 74
End: 2019-11-30
Payer: MEDICARE

## 2019-11-30 PROCEDURE — 99490 CHRNC CARE MGMT STAFF 1ST 20: CPT | Mod: S$PBB,,, | Performed by: INTERNAL MEDICINE

## 2019-11-30 PROCEDURE — 99490 PR CHRONIC CARE MGMT, 1ST 20 MIN: ICD-10-PCS | Mod: S$PBB,,, | Performed by: INTERNAL MEDICINE

## 2019-11-30 PROCEDURE — 99490 CHRNC CARE MGMT STAFF 1ST 20: CPT | Mod: PBBFAC,PO | Performed by: INTERNAL MEDICINE

## 2019-12-02 ENCOUNTER — IMMUNIZATION (OUTPATIENT)
Dept: PHARMACY | Facility: CLINIC | Age: 74
End: 2019-12-02
Payer: MEDICARE

## 2019-12-03 ENCOUNTER — TELEPHONE (OUTPATIENT)
Dept: OTOLARYNGOLOGY | Facility: CLINIC | Age: 74
End: 2019-12-03

## 2019-12-03 RX ORDER — CIPROFLOXACIN AND DEXAMETHASONE 3; 1 MG/ML; MG/ML
4 SUSPENSION/ DROPS AURICULAR (OTIC) 2 TIMES DAILY
Qty: 10 ML | Refills: 2 | Status: SHIPPED | OUTPATIENT
Start: 2019-12-03 | End: 2019-12-13

## 2019-12-03 NOTE — TELEPHONE ENCOUNTER
----- Message from Guido Trejo MD sent at 12/3/2019  2:12 PM CST -----  I called him in some Ciprodex. Pls call him and tell him to start the ear qtts.    TKS.  ----- Message -----  From: Jelena Lucas MA  Sent: 12/3/2019  10:53 AM CST  To: Guido Trejo MD    Hi Dr. Trejo! Mr. Allen is complaining that since the tube has been experiencing drainage. I scheduled him for next Friday 13th. Please advise.

## 2019-12-11 DIAGNOSIS — J44.9 CHRONIC OBSTRUCTIVE PULMONARY DISEASE, UNSPECIFIED COPD TYPE: Primary | ICD-10-CM

## 2019-12-11 RX ORDER — BUDESONIDE AND FORMOTEROL FUMARATE DIHYDRATE 160; 4.5 UG/1; UG/1
AEROSOL RESPIRATORY (INHALATION)
Qty: 3 INHALER | Refills: 3 | Status: SHIPPED | OUTPATIENT
Start: 2019-12-11 | End: 2021-01-13

## 2019-12-13 ENCOUNTER — OFFICE VISIT (OUTPATIENT)
Dept: OTOLARYNGOLOGY | Facility: CLINIC | Age: 74
End: 2019-12-13
Payer: MEDICARE

## 2019-12-13 VITALS — WEIGHT: 244.94 LBS | BODY MASS INDEX: 35.07 KG/M2 | HEIGHT: 70 IN

## 2019-12-13 DIAGNOSIS — H66.92 CHRONIC OTITIS MEDIA OF LEFT EAR: Primary | ICD-10-CM

## 2019-12-13 PROCEDURE — 99024 PR POST-OP FOLLOW-UP VISIT: ICD-10-PCS | Mod: POP,,, | Performed by: OTOLARYNGOLOGY

## 2019-12-13 PROCEDURE — 99999 PR PBB SHADOW E&M-EST. PATIENT-LVL III: CPT | Mod: PBBFAC,,, | Performed by: OTOLARYNGOLOGY

## 2019-12-13 PROCEDURE — 99999 PR PBB SHADOW E&M-EST. PATIENT-LVL III: ICD-10-PCS | Mod: PBBFAC,,, | Performed by: OTOLARYNGOLOGY

## 2019-12-13 PROCEDURE — 99213 OFFICE O/P EST LOW 20 MIN: CPT | Mod: PBBFAC | Performed by: OTOLARYNGOLOGY

## 2019-12-13 PROCEDURE — 99024 POSTOP FOLLOW-UP VISIT: CPT | Mod: POP,,, | Performed by: OTOLARYNGOLOGY

## 2019-12-13 NOTE — PROGRESS NOTES
Subjective:       Patient ID: Jordin Allen Jr. is a 74 y.o. male.    Chief Complaint: Other (fluid in left ear, better now )    Here for re check.    Left ear s/p tube 11/27.      Had some drainage improved with drops.    Exam: L EAC cleaned, tube in place and patent, ME dry.    Water precautions discussed. Continue drops for 10-day course.    RTC PRN

## 2019-12-17 ENCOUNTER — OFFICE VISIT (OUTPATIENT)
Dept: CARDIOLOGY | Facility: CLINIC | Age: 74
End: 2019-12-17
Payer: MEDICARE

## 2019-12-17 VITALS
BODY MASS INDEX: 35.36 KG/M2 | HEART RATE: 54 BPM | WEIGHT: 247 LBS | SYSTOLIC BLOOD PRESSURE: 138 MMHG | HEIGHT: 70 IN | DIASTOLIC BLOOD PRESSURE: 82 MMHG

## 2019-12-17 DIAGNOSIS — E78.5 DYSLIPIDEMIA: ICD-10-CM

## 2019-12-17 DIAGNOSIS — Z87.891 HISTORY OF SMOKING 30 OR MORE PACK YEARS: ICD-10-CM

## 2019-12-17 DIAGNOSIS — G47.33 OSA (OBSTRUCTIVE SLEEP APNEA): ICD-10-CM

## 2019-12-17 DIAGNOSIS — I25.10 CORONARY ARTERY DISEASE INVOLVING NATIVE CORONARY ARTERY OF NATIVE HEART WITHOUT ANGINA PECTORIS: Primary | Chronic | ICD-10-CM

## 2019-12-17 DIAGNOSIS — E66.01 SEVERE OBESITY: ICD-10-CM

## 2019-12-17 DIAGNOSIS — I10 HTN (HYPERTENSION), BENIGN: ICD-10-CM

## 2019-12-17 PROCEDURE — 99214 PR OFFICE/OUTPT VISIT, EST, LEVL IV, 30-39 MIN: ICD-10-PCS | Mod: S$PBB,,, | Performed by: INTERNAL MEDICINE

## 2019-12-17 PROCEDURE — 99214 OFFICE O/P EST MOD 30 MIN: CPT | Mod: S$PBB,,, | Performed by: INTERNAL MEDICINE

## 2019-12-17 PROCEDURE — 1159F PR MEDICATION LIST DOCUMENTED IN MEDICAL RECORD: ICD-10-PCS | Mod: ,,, | Performed by: INTERNAL MEDICINE

## 2019-12-17 PROCEDURE — 99213 OFFICE O/P EST LOW 20 MIN: CPT | Mod: PBBFAC,PO | Performed by: INTERNAL MEDICINE

## 2019-12-17 PROCEDURE — 1126F PR PAIN SEVERITY QUANTIFIED, NO PAIN PRESENT: ICD-10-PCS | Mod: ,,, | Performed by: INTERNAL MEDICINE

## 2019-12-17 PROCEDURE — 1159F MED LIST DOCD IN RCRD: CPT | Mod: ,,, | Performed by: INTERNAL MEDICINE

## 2019-12-17 PROCEDURE — 99999 PR PBB SHADOW E&M-EST. PATIENT-LVL III: ICD-10-PCS | Mod: PBBFAC,,, | Performed by: INTERNAL MEDICINE

## 2019-12-17 PROCEDURE — 1126F AMNT PAIN NOTED NONE PRSNT: CPT | Mod: ,,, | Performed by: INTERNAL MEDICINE

## 2019-12-17 PROCEDURE — 99999 PR PBB SHADOW E&M-EST. PATIENT-LVL III: CPT | Mod: PBBFAC,,, | Performed by: INTERNAL MEDICINE

## 2019-12-17 RX ORDER — CIPROFLOXACIN AND DEXAMETHASONE 3; 1 MG/ML; MG/ML
4 SUSPENSION/ DROPS AURICULAR (OTIC) 2 TIMES DAILY
COMMUNITY
End: 2021-01-06

## 2019-12-17 RX ORDER — AMLODIPINE BESYLATE 5 MG/1
5 TABLET ORAL DAILY
Qty: 90 TABLET | Refills: 3 | Status: SHIPPED | OUTPATIENT
Start: 2019-12-17 | End: 2020-11-21 | Stop reason: SDUPTHER

## 2019-12-17 NOTE — PROGRESS NOTES
"Subjective:   Patient ID:  Jordin Allen Jr. is a 74 y.o. male who presents for follow up of Coronary Artery Disease      HPI: Yearly routine f/u.  He's stopped playing golf as much as he used to because his partners all got seriously ill on him.    He's up 9# since last year.    He otherwise is doing well with no new symptoms or cardiovascular complaints and no change in exercise capacity.  He denies chest discomfort, MONTIEL, palpitations, PND/orthopnea, lightheadedness and syncope.      Dec 2018 HPI: Here a little early for yearly routine f/u.  Still playing golf 4-5 times per week at [facility] without any problems.     He is doing well with no new symptoms or cardiovascular complaints and no change in exercise capacity.  He denies chest discomfort, MONTIEL, palpitations, PND/orthopnea, lightheadedness and syncope.     He continues to take prednisone 5mg daily and is now seeing Dr. Pemberton as Dr. Estrada retired.     He decided against doing bariatric surgery.  His BMI today is 34.  He's down 5# since February.     Feb 2018 HPI: Routine yearly f/u.  Doing well with no complaints.  He did not end up going through with gastric bypass.  Weight is stable.  BMI registered today as slightly under 35 but that's because 5'10" was used as his height.  He's really more like 5'9".     He denies chest discomfort, MONTIEL, palpitations, PND/orthopnea, lightheadedness and syncope.     Still playing golf 4-5 times per week at [facility].  No issues or new limitations.     His BP was a little up today but he was worked up because of the parking situation today.  He brings with him, though, a list of BPs over the last two weeks and almost all of the readings are under 130 systolic, with several in the 100s-110s.        Feb 2017 HPI: Here for preoperative evaluation prior to gastric sleeve operation.  He continues with phase III rehab and continues to do very well.  He denies chest discomfort, MONTIEL, palpitations, PND/orthopnea, lightheadedness " and syncope.        June 2016 HPI: Mr. Allen is a very pleasant man who has been seen by Dr. Pinedo at [place] for an MI on [facility] three years ago. He had an occluded proximal LAD that was treated successfully and he retained normal systolic function.     Prior to his initial visit with me 15 months ago, he had had a rash and was inadvertently told by a nurse to hold his ASA and Lipitor. He's back on that now and doing fine. He has had a problem with pruritic rashes in the past but that is not a problem currently and hasn't had a flare in about 3 months. These patches are now mainly on his anterior forearms.     He's going to phase III rehab now and doing well. He denies chest discomfort, MONTIEL, palpitations, PND/orthopnea, lightheadedness and syncope.     He takes prednisone 10mg every other day as directed by a Pulmonologist.    Patient Active Problem List   Diagnosis    Centrilobular emphysema    GERD (gastroesophageal reflux disease)    HTN (hypertension), benign    Sciatica    SHOLA (obstructive sleep apnea)    Restless leg syndrome    Benign neoplasm of colon    CAD (coronary artery disease)    Rash    Severe obesity    History of smoking 30 or more pack years    Chronic obstructive pulmonary disease    NAFLD (nonalcoholic fatty liver disease)    Adenomatous polyp of colon    Scrotal abscess    Personal history of colonic polyps    Dyslipidemia       Current Outpatient Medications   Medication Sig    albuterol (PROVENTIL/VENTOLIN HFA) 90 mcg/actuation inhaler Inhale 2 puffs into the lungs every 4 (four) hours as needed for Wheezing.    albuterol-ipratropium (DUO-NEB) 2.5 mg-0.5 mg/3 mL nebulizer solution INHALE 1 VIAL VIA NEBULIZER EVERY 6 HOURS AS NEEDED    amitriptyline (ELAVIL) 25 MG tablet Take 1 tablet (25 mg total) by mouth once daily.    aspirin (ECOTRIN) 81 MG EC tablet Take 81 mg by mouth once.    atorvastatin (LIPITOR) 80 MG tablet TAKE 1 TABLET (80 MG TOTAL) BY MOUTH ONCE DAILY.     benzonatate (TESSALON PERLES) 100 MG capsule Take 1 capsule (100 mg total) by mouth every 6 (six) hours as needed for Cough.    BETA-CAROTENE,A, W-C & E/MIN (OCUVITE ORAL) Take by mouth once daily.     budesonide-formoterol 160-4.5 mcg (SYMBICORT) 160-4.5 mcg/actuation HFAA INHALE 2 PUFFS INTO THE LUNGS EVERY 12 HOURS. RINSE MOUTH AFTER USE    ciprofloxacin-dexamethasone 0.3-0.1% (CIPRODEX) 0.3-0.1 % DrpS Place 4 drops into both ears 2 (two) times daily.    DALIRESP 500 mcg Tab TAKE 1 TABLET BY MOUTH EVERY DAY    echinacea 400 mg Cap Take by mouth once daily.     flu vacc [id number],65yr up, mcg/0.5 mL Syrg Inject into the muscle.    fluticasone propionate (FLONASE) 50 mcg/actuation nasal spray 1 spray (50 mcg total) by Each Nostril route once daily.    hydroCHLOROthiazide (HYDRODIURIL) 25 MG tablet Take 0.5 tablets (12.5 mg total) by mouth once daily.    latanoprost 0.005 % ophthalmic solution Place 1 drop into both eyes once daily.     losartan (COZAAR) 100 MG tablet TAKE 1 TABLET BY MOUTH EVERY DAY    nitroGLYCERIN (NITROSTAT) 0.4 MG SL tablet Place 1 tablet (0.4 mg total) under the tongue every 5 (five) minutes as needed.    omeprazole (PRILOSEC) 20 MG capsule Take 1 capsule (20 mg total) by mouth once daily.    predniSONE (DELTASONE) 5 MG tablet TAKE 1 TABLET BY MOUTH EVERY OTHER DAY. TAKE WITH FOOD.    SPIRIVA WITH HANDIHALER 18 mcg inhalation capsule INHALE 1 CAPSULE WITH INHALATION DEVICE ONCE DAILY    varicella-zoster [id number], PF, (SHINGRIX, PF,) 50 mcg/0.5 mL injection Inject into the muscle.     No current facility-administered medications for this visit.        Review of Systems   Constitution: Negative.   HENT: Negative.    Eyes: Negative.    Cardiovascular: Negative.  Negative for chest pain, dyspnea on exertion, near-syncope, orthopnea and palpitations.   Respiratory: Negative.  Negative for cough and shortness of breath.    Endocrine: Negative.    Hematologic/Lymphatic:  Negative.    Skin: Negative.    Musculoskeletal: Negative.    Gastrointestinal: Negative.    Genitourinary: Negative.    Neurological: Negative.    Psychiatric/Behavioral: Negative.      Objective:   Physical Exam   Constitutional: He is oriented to person, place, and time. He appears well-developed and well-nourished.   HENT:   Head: Normocephalic and atraumatic.   Mouth/Throat: Oropharynx is clear and moist.   Eyes: Conjunctivae and EOM are normal. No scleral icterus.   Neck: Normal range of motion. Neck supple. No JVD present.   Cardiovascular: Normal rate, regular rhythm, normal heart sounds and intact distal pulses. Exam reveals no gallop and no friction rub.   No murmur heard.  Pulmonary/Chest: Effort normal and breath sounds normal. He has no wheezes. He has no rales.   Abdominal: Soft. Bowel sounds are normal. He exhibits no distension. There is no tenderness.   Musculoskeletal: Normal range of motion. He exhibits no edema.   Neurological: He is alert and oriented to person, place, and time.   Skin: Skin is warm and dry. No rash noted. No erythema.   Psychiatric: He has a normal mood and affect. His behavior is normal. Judgment and thought content normal.   Vitals reviewed.      Lab Results   Component Value Date    WBC 11.09 10/31/2019    HGB 13.6 (L) 10/31/2019    HCT 43.8 10/31/2019    MCV 84 10/31/2019     10/31/2019         Chemistry        Component Value Date/Time     10/31/2019 0857    K 4.1 10/31/2019 0857     10/31/2019 0857    CO2 28 10/31/2019 0857    BUN 19 10/31/2019 0857    CREATININE 1.1 10/31/2019 0857     10/31/2019 0857        Component Value Date/Time    CALCIUM 9.8 10/31/2019 0857    ALKPHOS 113 10/31/2019 0857    AST 21 10/31/2019 0857    ALT 27 10/31/2019 0857    BILITOT 0.7 10/31/2019 0857    ESTGFRAFRICA >60.0 10/31/2019 0857    EGFRNONAA >60.0 10/31/2019 0857            Lab Results   Component Value Date    CHOL 100 (L) 10/31/2019    CHOL 97 (L) 10/10/2018     CHOL 93 (L) 08/08/2017     Lab Results   Component Value Date    HDL 41 10/31/2019    HDL 44 10/10/2018    HDL 43 08/08/2017     Lab Results   Component Value Date    LDLCALC 45.2 (L) 10/31/2019    LDLCALC 44.8 (L) 10/10/2018    LDLCALC 35.4 (L) 08/08/2017     Lab Results   Component Value Date    TRIG 69 10/31/2019    TRIG 41 10/10/2018    TRIG 73 08/08/2017     Lab Results   Component Value Date    CHOLHDL 41.0 10/31/2019    CHOLHDL 45.4 10/10/2018    CHOLHDL 46.2 08/08/2017       Lab Results   Component Value Date    TSH 0.589 10/31/2019       Lab Results   Component Value Date    HGBA1C 5.8 05/30/2013       Assessment:     1. Coronary artery disease involving native coronary artery of native heart without angina pectoris    2. HTN (hypertension), benign    3. Severe obesity    4. Dyslipidemia    5. SHOLA (obstructive sleep apnea)    6. History of smoking 30 or more pack years        Plan:     Add amlodipine 5mg daily.    Otherwise, continue current medicines.    Diet/exercise goals reinforced.    F/U 12 months

## 2019-12-24 ENCOUNTER — PATIENT MESSAGE (OUTPATIENT)
Dept: INTERNAL MEDICINE | Facility: CLINIC | Age: 74
End: 2019-12-24

## 2019-12-31 ENCOUNTER — EXTERNAL CHRONIC CARE MANAGEMENT (OUTPATIENT)
Dept: PRIMARY CARE CLINIC | Facility: CLINIC | Age: 74
End: 2019-12-31
Payer: MEDICARE

## 2019-12-31 PROCEDURE — 99490 PR CHRONIC CARE MGMT, 1ST 20 MIN: ICD-10-PCS | Mod: S$PBB,,, | Performed by: INTERNAL MEDICINE

## 2019-12-31 PROCEDURE — 99490 CHRNC CARE MGMT STAFF 1ST 20: CPT | Mod: S$PBB,,, | Performed by: INTERNAL MEDICINE

## 2019-12-31 PROCEDURE — 99490 CHRNC CARE MGMT STAFF 1ST 20: CPT | Mod: PBBFAC,PO | Performed by: INTERNAL MEDICINE

## 2020-01-10 RX ORDER — NITROGLYCERIN 0.4 MG/1
0.4 TABLET SUBLINGUAL EVERY 5 MIN PRN
Qty: 100 TABLET | Refills: 3 | Status: SHIPPED | OUTPATIENT
Start: 2020-01-10 | End: 2021-01-14 | Stop reason: SDUPTHER

## 2020-01-18 DIAGNOSIS — J43.2 CENTRILOBULAR EMPHYSEMA: Chronic | ICD-10-CM

## 2020-01-19 RX ORDER — PREDNISONE 5 MG/1
TABLET ORAL
Qty: 30 TABLET | Refills: 6 | Status: SHIPPED | OUTPATIENT
Start: 2020-01-19 | End: 2021-03-12

## 2020-01-31 ENCOUNTER — EXTERNAL CHRONIC CARE MANAGEMENT (OUTPATIENT)
Dept: PRIMARY CARE CLINIC | Facility: CLINIC | Age: 75
End: 2020-01-31
Payer: MEDICARE

## 2020-01-31 PROCEDURE — 99490 PR CHRONIC CARE MGMT, 1ST 20 MIN: ICD-10-PCS | Mod: S$PBB,,, | Performed by: INTERNAL MEDICINE

## 2020-01-31 PROCEDURE — 99490 CHRNC CARE MGMT STAFF 1ST 20: CPT | Mod: S$PBB,,, | Performed by: INTERNAL MEDICINE

## 2020-01-31 PROCEDURE — 99490 CHRNC CARE MGMT STAFF 1ST 20: CPT | Mod: PBBFAC,PO | Performed by: INTERNAL MEDICINE

## 2020-01-31 RX ORDER — AMITRIPTYLINE HYDROCHLORIDE 25 MG/1
TABLET, FILM COATED ORAL
Qty: 90 TABLET | Refills: 2 | Status: SHIPPED | OUTPATIENT
Start: 2020-01-31 | End: 2020-11-23 | Stop reason: SDUPTHER

## 2020-02-05 ENCOUNTER — OFFICE VISIT (OUTPATIENT)
Dept: INTERNAL MEDICINE | Facility: CLINIC | Age: 75
End: 2020-02-05
Payer: MEDICARE

## 2020-02-05 VITALS
RESPIRATION RATE: 16 BRPM | BODY MASS INDEX: 34.18 KG/M2 | TEMPERATURE: 98 F | DIASTOLIC BLOOD PRESSURE: 80 MMHG | WEIGHT: 238.75 LBS | HEIGHT: 70 IN | HEART RATE: 67 BPM | SYSTOLIC BLOOD PRESSURE: 140 MMHG

## 2020-02-05 DIAGNOSIS — I10 HTN (HYPERTENSION), BENIGN: Primary | ICD-10-CM

## 2020-02-05 PROCEDURE — 99999 PR PBB SHADOW E&M-EST. PATIENT-LVL III: ICD-10-PCS | Mod: PBBFAC,,, | Performed by: INTERNAL MEDICINE

## 2020-02-05 PROCEDURE — 99999 PR PBB SHADOW E&M-EST. PATIENT-LVL III: CPT | Mod: PBBFAC,,, | Performed by: INTERNAL MEDICINE

## 2020-02-05 PROCEDURE — 99213 OFFICE O/P EST LOW 20 MIN: CPT | Mod: PBBFAC,PO | Performed by: INTERNAL MEDICINE

## 2020-02-05 PROCEDURE — 99213 PR OFFICE/OUTPT VISIT, EST, LEVL III, 20-29 MIN: ICD-10-PCS | Mod: S$PBB,,, | Performed by: INTERNAL MEDICINE

## 2020-02-05 PROCEDURE — 99213 OFFICE O/P EST LOW 20 MIN: CPT | Mod: S$PBB,,, | Performed by: INTERNAL MEDICINE

## 2020-02-05 NOTE — PROGRESS NOTES
History of present illness:  74-year-old gentleman hypertension, COPD, CAD and others following up today primarily on hypertension.  Amlodipine was added to his regimen in December 2019.  He returns for recheck.  The patient reports no side effects.  Brings home readings multiple which are reviewed and are all within normal limits.    Current medications:  All medications are noted and reviewed in the electronic med record medication list.    :  Sh 2 shins:  No fever no chills  Cardiovascular:  No chest pain palpitations syncope.  No edema.    Past medical history, past surgical history, family medical history social history is are all noted and reviewed in the electronic medical record history section.    Physical examination:  General:  Pleasant alert appropriately groomed gentleman acute distress.  Vital Signs:  Blood pressure taken manually by this examiner 110/72.  Other vital signs normal.    Impression:  A 10 well controlled.  Other chronic medical disease as noted in the electronic medical record history sections all stable.      Plan:  Continue current pharmacologic regimen.  Return clinic 6 months follow-up.

## 2020-02-10 ENCOUNTER — DOCUMENTATION ONLY (OUTPATIENT)
Dept: AUDIOLOGY | Facility: CLINIC | Age: 75
End: 2020-02-10

## 2020-02-10 NOTE — PROGRESS NOTES
Both hearing aids are constantly going in and out. Receivers on both were changed. Patient requested to have them sent in for repair. Mr. Allen is scheduled to see Paloma on 3/3/2020 for .    Niurka Garcia B70-R  [employer]  Invoice Date: 6/26/17   Lt SN [id number]  Rt SN [id number]  : 2xS  Dome: [employer]  Warranty Exp 9/23/20

## 2020-02-12 RX ORDER — TIOTROPIUM BROMIDE 18 UG/1
CAPSULE ORAL; RESPIRATORY (INHALATION)
Qty: 30 CAPSULE | Refills: 12 | Status: SHIPPED | OUTPATIENT
Start: 2020-02-12 | End: 2021-01-08

## 2020-02-29 ENCOUNTER — EXTERNAL CHRONIC CARE MANAGEMENT (OUTPATIENT)
Dept: PRIMARY CARE CLINIC | Facility: CLINIC | Age: 75
End: 2020-02-29
Payer: MEDICARE

## 2020-02-29 PROCEDURE — 99490 PR CHRONIC CARE MGMT, 1ST 20 MIN: ICD-10-PCS | Mod: S$PBB,,, | Performed by: INTERNAL MEDICINE

## 2020-02-29 PROCEDURE — 99490 CHRNC CARE MGMT STAFF 1ST 20: CPT | Mod: PBBFAC,PO | Performed by: INTERNAL MEDICINE

## 2020-02-29 PROCEDURE — 99490 CHRNC CARE MGMT STAFF 1ST 20: CPT | Mod: S$PBB,,, | Performed by: INTERNAL MEDICINE

## 2020-03-03 ENCOUNTER — CLINICAL SUPPORT (OUTPATIENT)
Dept: AUDIOLOGY | Facility: CLINIC | Age: 75
End: 2020-03-03
Payer: MEDICARE

## 2020-03-03 DIAGNOSIS — H90.3 SENSORINEURAL HEARING LOSS, BILATERAL: Primary | ICD-10-CM

## 2020-03-03 PROCEDURE — 99499 UNLISTED E&M SERVICE: CPT | Mod: S$PBB,,, | Performed by: AUDIOLOGIST-HEARING AID FITTER

## 2020-03-03 PROCEDURE — 99499 NO LOS: ICD-10-PCS | Mod: S$PBB,,, | Performed by: AUDIOLOGIST-HEARING AID FITTER

## 2020-03-03 NOTE — PROGRESS NOTES
Jordin Allen  was seen in the clinic today to  his repaired hearing aids.     The hearing aids were programmed. We discussed changing the d-dry caps every month to see if this helps resolve his frequent repairs. Mr Allen was reminder his warranty  in September. He was encouraged to have the hearing aids be sent off for a clean and check prior to the warranty expiring.    Mr. Allen will call the clinic for a follow-up appointment as needed.

## 2020-03-15 ENCOUNTER — PATIENT MESSAGE (OUTPATIENT)
Dept: INTERNAL MEDICINE | Facility: CLINIC | Age: 75
End: 2020-03-15

## 2020-03-16 RX ORDER — ESCITALOPRAM OXALATE 5 MG/1
TABLET ORAL
Qty: 30 TABLET | Refills: 1 | Status: SHIPPED | OUTPATIENT
Start: 2020-03-16 | End: 2020-06-08

## 2020-03-16 NOTE — TELEPHONE ENCOUNTER
Will send in med to take daily for the next 1-2 months.  Lexapro.  Give us an update in 1-2 weeks.

## 2020-03-19 ENCOUNTER — PATIENT MESSAGE (OUTPATIENT)
Dept: DERMATOLOGY | Facility: CLINIC | Age: 75
End: 2020-03-19

## 2020-03-31 ENCOUNTER — EXTERNAL CHRONIC CARE MANAGEMENT (OUTPATIENT)
Dept: PRIMARY CARE CLINIC | Facility: CLINIC | Age: 75
End: 2020-03-31
Payer: MEDICARE

## 2020-03-31 ENCOUNTER — PATIENT MESSAGE (OUTPATIENT)
Dept: INTERNAL MEDICINE | Facility: CLINIC | Age: 75
End: 2020-03-31

## 2020-03-31 PROCEDURE — 99490 CHRNC CARE MGMT STAFF 1ST 20: CPT | Mod: S$PBB,,, | Performed by: INTERNAL MEDICINE

## 2020-03-31 PROCEDURE — G2058 CCM ADD 20MIN: HCPCS | Mod: S$PBB,,, | Performed by: INTERNAL MEDICINE

## 2020-03-31 PROCEDURE — 99490 CHRNC CARE MGMT STAFF 1ST 20: CPT | Mod: PBBFAC,PO | Performed by: INTERNAL MEDICINE

## 2020-03-31 PROCEDURE — G2058 CCM ADD 20MIN: HCPCS | Mod: PBBFAC,PO | Performed by: INTERNAL MEDICINE

## 2020-03-31 PROCEDURE — 99490 PR CHRONIC CARE MGMT, 1ST 20 MIN: ICD-10-PCS | Mod: S$PBB,,, | Performed by: INTERNAL MEDICINE

## 2020-03-31 PROCEDURE — G2058 PR CHRON CARE MGMT, EA ADDTL 20 MINS: ICD-10-PCS | Mod: S$PBB,,, | Performed by: INTERNAL MEDICINE

## 2020-04-06 DIAGNOSIS — J43.2 CENTRILOBULAR EMPHYSEMA: ICD-10-CM

## 2020-04-06 DIAGNOSIS — J44.1 CHRONIC OBSTRUCTIVE PULMONARY DISEASE WITH ACUTE EXACERBATION: ICD-10-CM

## 2020-04-06 RX ORDER — ROFLUMILAST 500 UG/1
TABLET ORAL
Qty: 90 TABLET | Refills: 3 | Status: SHIPPED | OUTPATIENT
Start: 2020-04-06 | End: 2021-03-04 | Stop reason: SDUPTHER

## 2020-04-20 RX ORDER — IPRATROPIUM BROMIDE AND ALBUTEROL SULFATE 2.5; .5 MG/3ML; MG/3ML
SOLUTION RESPIRATORY (INHALATION)
Qty: 90 ML | Refills: 3 | Status: SHIPPED | OUTPATIENT
Start: 2020-04-20 | End: 2021-07-18 | Stop reason: SDUPTHER

## 2020-04-21 DIAGNOSIS — J31.0 RHINITIS, UNSPECIFIED TYPE: ICD-10-CM

## 2020-04-21 RX ORDER — FLUTICASONE PROPIONATE 50 MCG
SPRAY, SUSPENSION (ML) NASAL
Qty: 48 ML | Refills: 1 | Status: SHIPPED | OUTPATIENT
Start: 2020-04-21 | End: 2021-06-01

## 2020-04-30 ENCOUNTER — EXTERNAL CHRONIC CARE MANAGEMENT (OUTPATIENT)
Dept: PRIMARY CARE CLINIC | Facility: CLINIC | Age: 75
End: 2020-04-30
Payer: MEDICARE

## 2020-04-30 PROCEDURE — 99490 PR CHRONIC CARE MGMT, 1ST 20 MIN: ICD-10-PCS | Mod: S$PBB,,, | Performed by: INTERNAL MEDICINE

## 2020-04-30 PROCEDURE — 99490 CHRNC CARE MGMT STAFF 1ST 20: CPT | Mod: S$PBB,,, | Performed by: INTERNAL MEDICINE

## 2020-04-30 PROCEDURE — 99490 CHRNC CARE MGMT STAFF 1ST 20: CPT | Mod: PBBFAC,PO | Performed by: INTERNAL MEDICINE

## 2020-05-04 ENCOUNTER — PATIENT MESSAGE (OUTPATIENT)
Dept: INTERNAL MEDICINE | Facility: CLINIC | Age: 75
End: 2020-05-04

## 2020-05-31 ENCOUNTER — EXTERNAL CHRONIC CARE MANAGEMENT (OUTPATIENT)
Dept: PRIMARY CARE CLINIC | Facility: CLINIC | Age: 75
End: 2020-05-31
Payer: MEDICARE

## 2020-05-31 PROCEDURE — 99490 CHRNC CARE MGMT STAFF 1ST 20: CPT | Mod: PBBFAC,PO | Performed by: INTERNAL MEDICINE

## 2020-05-31 PROCEDURE — 99490 CHRNC CARE MGMT STAFF 1ST 20: CPT | Mod: S$PBB,,, | Performed by: INTERNAL MEDICINE

## 2020-05-31 PROCEDURE — 99490 PR CHRONIC CARE MGMT, 1ST 20 MIN: ICD-10-PCS | Mod: S$PBB,,, | Performed by: INTERNAL MEDICINE

## 2020-06-17 ENCOUNTER — TELEPHONE (OUTPATIENT)
Dept: DERMATOLOGY | Facility: CLINIC | Age: 75
End: 2020-06-17

## 2020-06-17 RX ORDER — LOSARTAN POTASSIUM 100 MG/1
100 TABLET ORAL DAILY
Qty: 90 TABLET | Refills: 2 | Status: SHIPPED | OUTPATIENT
Start: 2020-06-17 | End: 2021-04-08

## 2020-06-17 NOTE — TELEPHONE ENCOUNTER
----- Message from Nadine Ortiz MA sent at 6/17/2020 10:01 AM CDT -----  Regarding: FW: Need to r/s missed appt  Contact: Kalli from [facility]    ----- Message -----  From: Cony Cortes  Sent: 6/17/2020   8:35 AM CDT  To: Iliana BLEVINS Staff  Subject: Need to r/s missed appt                          Name of Caller: Kalli  Reason for Visit/Symptoms: skin check  Best Contact Number or Confirm if Mychart Preferred:   Preferred Date/Time of Appointment: First available today  Interested in Virtual Visit (yes/no): No  Additional Information: Need to get pt derm visit r/s'd

## 2020-06-30 ENCOUNTER — EXTERNAL CHRONIC CARE MANAGEMENT (OUTPATIENT)
Dept: PRIMARY CARE CLINIC | Facility: CLINIC | Age: 75
End: 2020-06-30
Payer: MEDICARE

## 2020-06-30 PROCEDURE — 99490 PR CHRONIC CARE MGMT, 1ST 20 MIN: ICD-10-PCS | Mod: S$PBB,,, | Performed by: INTERNAL MEDICINE

## 2020-06-30 PROCEDURE — 99490 CHRNC CARE MGMT STAFF 1ST 20: CPT | Mod: PBBFAC,PO | Performed by: INTERNAL MEDICINE

## 2020-06-30 PROCEDURE — 99490 CHRNC CARE MGMT STAFF 1ST 20: CPT | Mod: S$PBB,,, | Performed by: INTERNAL MEDICINE

## 2020-07-17 DIAGNOSIS — Z71.89 COMPLEX CARE COORDINATION: ICD-10-CM

## 2020-07-31 ENCOUNTER — EXTERNAL CHRONIC CARE MANAGEMENT (OUTPATIENT)
Dept: PRIMARY CARE CLINIC | Facility: CLINIC | Age: 75
End: 2020-07-31
Payer: MEDICARE

## 2020-07-31 PROCEDURE — 99490 CHRNC CARE MGMT STAFF 1ST 20: CPT | Mod: PBBFAC,PO | Performed by: INTERNAL MEDICINE

## 2020-07-31 PROCEDURE — 99490 CHRNC CARE MGMT STAFF 1ST 20: CPT | Mod: S$PBB,,, | Performed by: INTERNAL MEDICINE

## 2020-07-31 PROCEDURE — 99490 PR CHRONIC CARE MGMT, 1ST 20 MIN: ICD-10-PCS | Mod: S$PBB,,, | Performed by: INTERNAL MEDICINE

## 2020-08-12 ENCOUNTER — OFFICE VISIT (OUTPATIENT)
Dept: DERMATOLOGY | Facility: CLINIC | Age: 75
End: 2020-08-12
Payer: MEDICARE

## 2020-08-12 ENCOUNTER — OFFICE VISIT (OUTPATIENT)
Dept: INTERNAL MEDICINE | Facility: CLINIC | Age: 75
End: 2020-08-12
Payer: MEDICARE

## 2020-08-12 VITALS
DIASTOLIC BLOOD PRESSURE: 60 MMHG | TEMPERATURE: 97 F | HEIGHT: 70 IN | RESPIRATION RATE: 16 BRPM | WEIGHT: 230.38 LBS | HEART RATE: 76 BPM | BODY MASS INDEX: 32.98 KG/M2 | SYSTOLIC BLOOD PRESSURE: 122 MMHG | OXYGEN SATURATION: 96 %

## 2020-08-12 DIAGNOSIS — D18.01 CHERRY ANGIOMA: Primary | ICD-10-CM

## 2020-08-12 DIAGNOSIS — R10.32 LEFT INGUINAL PAIN: ICD-10-CM

## 2020-08-12 DIAGNOSIS — L91.8 SKIN TAG: ICD-10-CM

## 2020-08-12 DIAGNOSIS — R10.32 PAIN, ABDOMINAL, LLQ: Primary | ICD-10-CM

## 2020-08-12 DIAGNOSIS — J43.2 CENTRILOBULAR EMPHYSEMA: Chronic | ICD-10-CM

## 2020-08-12 DIAGNOSIS — E66.01 SEVERE OBESITY: ICD-10-CM

## 2020-08-12 DIAGNOSIS — L73.8 SEBACEOUS GLAND HYPERPLASIA: ICD-10-CM

## 2020-08-12 DIAGNOSIS — L82.1 SK (SEBORRHEIC KERATOSIS): ICD-10-CM

## 2020-08-12 PROCEDURE — 99213 OFFICE O/P EST LOW 20 MIN: CPT | Mod: S$PBB,,, | Performed by: INTERNAL MEDICINE

## 2020-08-12 PROCEDURE — 99999 PR PBB SHADOW E&M-EST. PATIENT-LVL V: CPT | Mod: PBBFAC,,, | Performed by: INTERNAL MEDICINE

## 2020-08-12 PROCEDURE — 99203 OFFICE O/P NEW LOW 30 MIN: CPT | Mod: S$PBB,,, | Performed by: DERMATOLOGY

## 2020-08-12 PROCEDURE — 99203 PR OFFICE/OUTPT VISIT, NEW, LEVL III, 30-44 MIN: ICD-10-PCS | Mod: S$PBB,,, | Performed by: DERMATOLOGY

## 2020-08-12 PROCEDURE — 99213 OFFICE O/P EST LOW 20 MIN: CPT | Mod: PBBFAC,PO | Performed by: DERMATOLOGY

## 2020-08-12 PROCEDURE — 99999 PR PBB SHADOW E&M-EST. PATIENT-LVL III: ICD-10-PCS | Mod: PBBFAC,,, | Performed by: DERMATOLOGY

## 2020-08-12 PROCEDURE — 99999 PR PBB SHADOW E&M-EST. PATIENT-LVL V: ICD-10-PCS | Mod: PBBFAC,,, | Performed by: INTERNAL MEDICINE

## 2020-08-12 PROCEDURE — 99215 OFFICE O/P EST HI 40 MIN: CPT | Mod: PBBFAC,27,PO | Performed by: INTERNAL MEDICINE

## 2020-08-12 PROCEDURE — 99213 PR OFFICE/OUTPT VISIT, EST, LEVL III, 20-29 MIN: ICD-10-PCS | Mod: S$PBB,,, | Performed by: INTERNAL MEDICINE

## 2020-08-12 PROCEDURE — 99999 PR PBB SHADOW E&M-EST. PATIENT-LVL III: CPT | Mod: PBBFAC,,, | Performed by: DERMATOLOGY

## 2020-08-12 NOTE — PROGRESS NOTES
Subjective:       Patient ID: Jordin Allen Jr. is a 74 y.o. male.    Chief Complaint: Abdominal Pain    Patient known to me presents for evaluation of a new problem for him.  He has been having a low grade, but fairly constant LLQ discomfort for over 1 week.  No obvious strain activity.  He does play golf but interestingly this doesn't bother him playing golf.  No radiation of pain from the left groin area.  No N/V, no fever/chills, no change in BM pattern or appearance.  No  symptoms.  He is concerned about a hernia.    Has chronic lung problems which seem to be stable.    Review of Systems   Constitutional: Negative for chills and fever.   Respiratory: Positive for cough. Negative for chest tightness and shortness of breath.    Cardiovascular: Negative.    Gastrointestinal: Positive for abdominal pain. Negative for abdominal distention, blood in stool, change in bowel habit, constipation, diarrhea, nausea, vomiting, reflux and change in bowel habit.   Genitourinary: Negative for dysuria, flank pain, frequency, hematuria and urgency.   Neurological: Negative for dizziness, light-headedness and headaches.         Objective:      Physical Exam  Vitals signs reviewed.   Constitutional:       General: He is not in acute distress.     Appearance: Normal appearance. He is obese.   Cardiovascular:      Rate and Rhythm: Normal rate and regular rhythm.      Pulses: Normal pulses.      Heart sounds: Normal heart sounds.   Pulmonary:      Effort: Pulmonary effort is normal. No respiratory distress.      Breath sounds: Normal breath sounds.   Abdominal:      General: Bowel sounds are normal. There is no distension.      Palpations: Abdomen is soft. There is no mass.      Tenderness: There is no abdominal tenderness. There is no guarding or rebound.      Hernia: No hernia is present.   Skin:     General: Skin is warm.   Neurological:      Mental Status: He is alert and oriented to person, place, and time.   Psychiatric:          Mood and Affect: Mood normal.         Behavior: Behavior normal.         Thought Content: Thought content normal.         Judgment: Judgment normal.         Assessment:       1. Pain, abdominal, LLQ    2. Left inguinal pain      3. Obesity.  4. Emphysema.  Plan:     1. Schedule abdominal ultrasound to r/o hernia.  2. Results by email.  3. Continue all regular meds.  3. Continue all regular meds.

## 2020-08-12 NOTE — PROGRESS NOTES
"  Subjective:       Patient ID:  Jordin Allen Jr. is a 74 y.o. male who presents for   Chief Complaint   Patient presents with    Skin Check     Pt here today for a TBSE  Pt c/o lesion to scalp x 1yr. No itching burning or bleeding. No prev tx     Patient is here today for a "mole" check.   Pt has a history of  average sun exposure in the past.   Pt recalls several blistering sunburns in the past- no  Pt has history of tanning bed use- no  Pt has  had moles removed in the past- no  Pt has history of melanoma in first degree relatives-  no        Review of Systems   Skin: Negative for daily sunscreen use and activity-related sunscreen use.   Hematologic/Lymphatic: Bruises/bleeds easily.        Objective:    Physical Exam   Constitutional: He appears well-developed and well-nourished. No distress.   Neurological: He is alert and oriented to person, place, and time. He is not disoriented.   Psychiatric: He has a normal mood and affect.   Skin:   Areas Examined (abnormalities noted in diagram):   Scalp / Hair Palpated and Inspected  Head / Face Inspection Performed  Neck Inspection Performed  Chest / Axilla Inspection Performed  Abdomen Inspection Performed  Genitals / Buttocks / Groin Inspection Performed  Back Inspection Performed  RUE Inspected  LUE Inspection Performed  RLE Inspected  LLE Inspection Performed  Nails and Digits Inspection Performed                   Diagram Legend     Erythematous scaling macule/papule c/w actinic keratosis       Vascular papule c/w angioma      Pigmented verrucoid papule/plaque c/w seborrheic keratosis      Yellow umbilicated papule c/w sebaceous hyperplasia      Irregularly shaped tan macule c/w lentigo     1-2 mm smooth white papules consistent with Milia      Movable subcutaneous cyst with punctum c/w epidermal inclusion cyst      Subcutaneous movable cyst c/w pilar cyst      Firm pink to brown papule c/w dermatofibroma      Pedunculated fleshy papule(s) c/w skin tag(s)      " Evenly pigmented macule c/w junctional nevus     Mildly variegated pigmented, slightly irregular-bordered macule c/w mildly atypical nevus      Flesh colored to evenly pigmented papule c/w intradermal nevus       Pink pearly papule/plaque c/w basal cell carcinoma      Erythematous hyperkeratotic cursted plaque c/w SCC      Surgical scar with no sign of skin cancer recurrence      Open and closed comedones      Inflammatory papules and pustules      Verrucoid papule consistent consistent with wart     Erythematous eczematous patches and plaques     Dystrophic onycholytic nail with subungual debris c/w onychomycosis     Umbilicated papule    Erythematous-base heme-crusted tan verrucoid plaque consistent with inflamed seborrheic keratosis     Erythematous Silvery Scaling Plaque c/w Psoriasis     See annotation      Assessment / Plan:        Cherry angioma  These are benign vascular lesions that are inherited.  Treatment is not necessary.    SK (seborrheic keratosis)  These are benign inherited growths without a malignant potential. Reassurance given to patient. No treatment is necessary.     Skin tag  Reassurance given to patient. No treatment is necessary.   Treatment of benign, asymptomatic lesions may be considered cosmetic.    Sebaceous gland hyperplasia  This is a common condition representing benign enlargement of the sebaceous lobule. It typically occurs in adulthood. Reassurance given to patient.     Total body skin examination performed today including at least 12 points as noted in physical examination. No lesions suspicious for malignancy noted.             Follow up in about 1 year (around 8/12/2021), or if symptoms worsen or fail to improve.

## 2020-08-12 NOTE — PATIENT INSTRUCTIONS
1. Schedule ultrasound to look for hernia on the left side.  2. Will email results and recommendations.

## 2020-08-24 ENCOUNTER — HOSPITAL ENCOUNTER (OUTPATIENT)
Dept: RADIOLOGY | Facility: HOSPITAL | Age: 75
Discharge: HOME | End: 2020-08-24
Attending: INTERNAL MEDICINE
Payer: MEDICARE

## 2020-08-24 DIAGNOSIS — R10.32 LEFT INGUINAL PAIN: ICD-10-CM

## 2020-08-24 DIAGNOSIS — R10.32 PAIN, ABDOMINAL, LLQ: ICD-10-CM

## 2020-08-24 PROCEDURE — 76705 ECHO EXAM OF ABDOMEN: CPT | Mod: 26,,, | Performed by: RADIOLOGY

## 2020-08-24 PROCEDURE — 76705 US ABDOMEN LIMITED_HERNIA: ICD-10-PCS | Mod: 26,,, | Performed by: RADIOLOGY

## 2020-08-24 PROCEDURE — 76705 ECHO EXAM OF ABDOMEN: CPT | Mod: TC

## 2020-08-25 ENCOUNTER — PATIENT MESSAGE (OUTPATIENT)
Dept: INTERNAL MEDICINE | Facility: CLINIC | Age: 75
End: 2020-08-25

## 2020-08-31 ENCOUNTER — EXTERNAL CHRONIC CARE MANAGEMENT (OUTPATIENT)
Dept: PRIMARY CARE CLINIC | Facility: CLINIC | Age: 75
End: 2020-08-31
Payer: MEDICARE

## 2020-08-31 PROCEDURE — 99490 PR CHRONIC CARE MGMT, 1ST 20 MIN: ICD-10-PCS | Mod: S$PBB,,, | Performed by: INTERNAL MEDICINE

## 2020-08-31 PROCEDURE — 99490 CHRNC CARE MGMT STAFF 1ST 20: CPT | Mod: PBBFAC,PO | Performed by: INTERNAL MEDICINE

## 2020-08-31 PROCEDURE — 99490 CHRNC CARE MGMT STAFF 1ST 20: CPT | Mod: S$PBB,,, | Performed by: INTERNAL MEDICINE

## 2020-09-04 ENCOUNTER — TELEPHONE (OUTPATIENT)
Dept: INTERNAL MEDICINE | Facility: CLINIC | Age: 75
End: 2020-09-04

## 2020-09-04 DIAGNOSIS — I10 HTN (HYPERTENSION), BENIGN: Primary | ICD-10-CM

## 2020-09-04 DIAGNOSIS — Z12.5 ENCOUNTER FOR PROSTATE CANCER SCREENING: ICD-10-CM

## 2020-09-04 DIAGNOSIS — E78.5 DYSLIPIDEMIA: ICD-10-CM

## 2020-09-04 NOTE — TELEPHONE ENCOUNTER
----- Message from Pierre Dos Santos sent at 9/4/2020  2:24 PM CDT -----  Contact: Patient  Doctor appointment and lab have been scheduled.  Please link lab orders to the lab appointment.  Date of doctor appointment:    Physical or EP:  11/19/20  Date of lab appointment: 11/09/20   Comments:     Thank you

## 2020-09-09 ENCOUNTER — IMMUNIZATION (OUTPATIENT)
Dept: PHARMACY | Facility: CLINIC | Age: 75
End: 2020-09-09
Payer: MEDICARE

## 2020-09-29 ENCOUNTER — PATIENT MESSAGE (OUTPATIENT)
Dept: OTHER | Facility: OTHER | Age: 75
End: 2020-09-29

## 2020-09-30 ENCOUNTER — EXTERNAL CHRONIC CARE MANAGEMENT (OUTPATIENT)
Dept: PRIMARY CARE CLINIC | Facility: CLINIC | Age: 75
End: 2020-09-30
Payer: MEDICARE

## 2020-09-30 PROCEDURE — 99490 PR CHRONIC CARE MGMT, 1ST 20 MIN: ICD-10-PCS | Mod: S$PBB,,, | Performed by: INTERNAL MEDICINE

## 2020-09-30 PROCEDURE — 99490 CHRNC CARE MGMT STAFF 1ST 20: CPT | Mod: PBBFAC,PO | Performed by: INTERNAL MEDICINE

## 2020-09-30 PROCEDURE — 99490 CHRNC CARE MGMT STAFF 1ST 20: CPT | Mod: S$PBB,,, | Performed by: INTERNAL MEDICINE

## 2020-10-09 RX ORDER — HYDROCHLOROTHIAZIDE 25 MG/1
12.5 TABLET ORAL DAILY
Qty: 30 TABLET | Refills: 6 | Status: SHIPPED | OUTPATIENT
Start: 2020-10-09 | End: 2021-10-13 | Stop reason: SDUPTHER

## 2020-10-31 ENCOUNTER — EXTERNAL CHRONIC CARE MANAGEMENT (OUTPATIENT)
Dept: PRIMARY CARE CLINIC | Facility: CLINIC | Age: 75
End: 2020-10-31
Payer: MEDICARE

## 2020-10-31 PROCEDURE — 99490 CHRNC CARE MGMT STAFF 1ST 20: CPT | Mod: PBBFAC,PO | Performed by: INTERNAL MEDICINE

## 2020-10-31 PROCEDURE — 99490 CHRNC CARE MGMT STAFF 1ST 20: CPT | Mod: S$PBB,,, | Performed by: INTERNAL MEDICINE

## 2020-10-31 PROCEDURE — 99490 PR CHRONIC CARE MGMT, 1ST 20 MIN: ICD-10-PCS | Mod: S$PBB,,, | Performed by: INTERNAL MEDICINE

## 2020-11-09 ENCOUNTER — LAB VISIT (OUTPATIENT)
Dept: LAB | Facility: HOSPITAL | Age: 75
End: 2020-11-09
Attending: INTERNAL MEDICINE
Payer: MEDICARE

## 2020-11-09 DIAGNOSIS — I10 HTN (HYPERTENSION), BENIGN: ICD-10-CM

## 2020-11-09 DIAGNOSIS — E78.5 DYSLIPIDEMIA: ICD-10-CM

## 2020-11-09 LAB
ALBUMIN SERPL BCP-MCNC: 3.6 G/DL (ref 3.5–5.2)
ALP SERPL-CCNC: 100 U/L (ref 55–135)
ALT SERPL W/O P-5'-P-CCNC: 23 U/L (ref 10–44)
ANION GAP SERPL CALC-SCNC: 10 MMOL/L (ref 8–16)
AST SERPL-CCNC: 16 U/L (ref 10–40)
BASOPHILS # BLD AUTO: 0.05 K/UL (ref 0–0.2)
BASOPHILS NFR BLD: 0.5 % (ref 0–1.9)
BILIRUB SERPL-MCNC: 0.6 MG/DL (ref 0.1–1)
BUN SERPL-MCNC: 25 MG/DL (ref 8–23)
CALCIUM SERPL-MCNC: 9.3 MG/DL (ref 8.7–10.5)
CHLORIDE SERPL-SCNC: 104 MMOL/L (ref 95–110)
CHOLEST SERPL-MCNC: 106 MG/DL (ref 120–199)
CHOLEST/HDLC SERPL: 2.4 {RATIO} (ref 2–5)
CO2 SERPL-SCNC: 27 MMOL/L (ref 23–29)
CREAT SERPL-MCNC: 0.9 MG/DL (ref 0.5–1.4)
DIFFERENTIAL METHOD: ABNORMAL
EOSINOPHIL # BLD AUTO: 0.4 K/UL (ref 0–0.5)
EOSINOPHIL NFR BLD: 3.6 % (ref 0–8)
ERYTHROCYTE [DISTWIDTH] IN BLOOD BY AUTOMATED COUNT: 14.3 % (ref 11.5–14.5)
EST. GFR  (AFRICAN AMERICAN): >60 ML/MIN/1.73 M^2
EST. GFR  (NON AFRICAN AMERICAN): >60 ML/MIN/1.73 M^2
GLUCOSE SERPL-MCNC: 96 MG/DL (ref 70–110)
HCT VFR BLD AUTO: 43.8 % (ref 40–54)
HDLC SERPL-MCNC: 44 MG/DL (ref 40–75)
HDLC SERPL: 41.5 % (ref 20–50)
HGB BLD-MCNC: 13.4 G/DL (ref 14–18)
IMM GRANULOCYTES # BLD AUTO: 0.04 K/UL (ref 0–0.04)
IMM GRANULOCYTES NFR BLD AUTO: 0.4 % (ref 0–0.5)
LDLC SERPL CALC-MCNC: 46.8 MG/DL (ref 63–159)
LYMPHOCYTES # BLD AUTO: 2.5 K/UL (ref 1–4.8)
LYMPHOCYTES NFR BLD: 23.4 % (ref 18–48)
MCH RBC QN AUTO: 26.4 PG (ref 27–31)
MCHC RBC AUTO-ENTMCNC: 30.6 G/DL (ref 32–36)
MCV RBC AUTO: 86 FL (ref 82–98)
MONOCYTES # BLD AUTO: 0.8 K/UL (ref 0.3–1)
MONOCYTES NFR BLD: 7.7 % (ref 4–15)
NEUTROPHILS # BLD AUTO: 6.9 K/UL (ref 1.8–7.7)
NEUTROPHILS NFR BLD: 64.4 % (ref 38–73)
NONHDLC SERPL-MCNC: 62 MG/DL
NRBC BLD-RTO: 0 /100 WBC
PLATELET # BLD AUTO: 192 K/UL (ref 150–350)
PMV BLD AUTO: 11.5 FL (ref 9.2–12.9)
POTASSIUM SERPL-SCNC: 3.6 MMOL/L (ref 3.5–5.1)
PROT SERPL-MCNC: 6.9 G/DL (ref 6–8.4)
RBC # BLD AUTO: 5.08 M/UL (ref 4.6–6.2)
SODIUM SERPL-SCNC: 141 MMOL/L (ref 136–145)
TRIGL SERPL-MCNC: 76 MG/DL (ref 30–150)
TSH SERPL DL<=0.005 MIU/L-ACNC: 0.61 UIU/ML (ref 0.4–4)
WBC # BLD AUTO: 10.66 K/UL (ref 3.9–12.7)

## 2020-11-09 PROCEDURE — 85025 COMPLETE CBC W/AUTO DIFF WBC: CPT

## 2020-11-09 PROCEDURE — 36415 COLL VENOUS BLD VENIPUNCTURE: CPT | Mod: PO

## 2020-11-09 PROCEDURE — 80053 COMPREHEN METABOLIC PANEL: CPT

## 2020-11-09 PROCEDURE — 84443 ASSAY THYROID STIM HORMONE: CPT

## 2020-11-09 PROCEDURE — 80061 LIPID PANEL: CPT

## 2020-11-23 RX ORDER — AMITRIPTYLINE HYDROCHLORIDE 25 MG/1
25 TABLET, FILM COATED ORAL DAILY
Qty: 90 TABLET | Refills: 2 | Status: SHIPPED | OUTPATIENT
Start: 2020-11-23 | End: 2020-11-23

## 2020-11-23 RX ORDER — AMITRIPTYLINE HYDROCHLORIDE 25 MG/1
25 TABLET, FILM COATED ORAL DAILY
Qty: 90 TABLET | Refills: 2 | Status: SHIPPED | OUTPATIENT
Start: 2020-11-23 | End: 2021-11-24

## 2020-11-23 RX ORDER — AMLODIPINE BESYLATE 5 MG/1
5 TABLET ORAL DAILY
Qty: 90 TABLET | Refills: 3 | Status: SHIPPED | OUTPATIENT
Start: 2020-11-23 | End: 2021-11-30 | Stop reason: SDUPTHER

## 2020-11-23 RX ORDER — AMLODIPINE BESYLATE 5 MG/1
5 TABLET ORAL DAILY
Qty: 90 TABLET | Refills: 3 | OUTPATIENT
Start: 2020-11-23 | End: 2021-11-23

## 2020-11-23 NOTE — TELEPHONE ENCOUNTER
----- Message from Rosie Agosto LPN sent at 11/23/2020  1:46 PM CST -----  Good afternoon,    I completed health check with patient today. He is requesting refill authorization for amitriptyline (ELAVIL) 25 MG QD.     Pharmacy has faxed with no response. Please consult with Dr. Mccain and order refill if agreeable.     If I can be of any assistance, I can be reached via In Basket or phone at [phone] Ext. [phone].     Thank you!   RONI Mahan   [facility] Care Coordinator

## 2020-11-30 ENCOUNTER — EXTERNAL CHRONIC CARE MANAGEMENT (OUTPATIENT)
Dept: PRIMARY CARE CLINIC | Facility: CLINIC | Age: 75
End: 2020-11-30
Payer: MEDICARE

## 2020-11-30 PROCEDURE — 99490 PR CHRONIC CARE MGMT, 1ST 20 MIN: ICD-10-PCS | Mod: S$PBB,,, | Performed by: INTERNAL MEDICINE

## 2020-11-30 PROCEDURE — 99490 CHRNC CARE MGMT STAFF 1ST 20: CPT | Mod: PBBFAC,PO | Performed by: INTERNAL MEDICINE

## 2020-11-30 PROCEDURE — 99490 CHRNC CARE MGMT STAFF 1ST 20: CPT | Mod: S$PBB,,, | Performed by: INTERNAL MEDICINE

## 2020-12-09 ENCOUNTER — OFFICE VISIT (OUTPATIENT)
Dept: INTERNAL MEDICINE | Facility: CLINIC | Age: 75
End: 2020-12-09
Payer: MEDICARE

## 2020-12-09 VITALS
SYSTOLIC BLOOD PRESSURE: 130 MMHG | DIASTOLIC BLOOD PRESSURE: 88 MMHG | BODY MASS INDEX: 32.98 KG/M2 | HEART RATE: 90 BPM | WEIGHT: 230.38 LBS | HEIGHT: 70 IN | OXYGEN SATURATION: 99 % | TEMPERATURE: 98 F

## 2020-12-09 DIAGNOSIS — Z87.891 HISTORY OF SMOKING 30 OR MORE PACK YEARS: ICD-10-CM

## 2020-12-09 DIAGNOSIS — J44.9 CHRONIC OBSTRUCTIVE PULMONARY DISEASE, UNSPECIFIED COPD TYPE: ICD-10-CM

## 2020-12-09 DIAGNOSIS — I10 HTN (HYPERTENSION), BENIGN: ICD-10-CM

## 2020-12-09 DIAGNOSIS — Z00.00 ANNUAL PHYSICAL EXAM: Primary | ICD-10-CM

## 2020-12-09 DIAGNOSIS — Z12.2 ENCOUNTER FOR SCREENING FOR MALIGNANT NEOPLASM OF RESPIRATORY ORGANS: ICD-10-CM

## 2020-12-09 DIAGNOSIS — I25.10 CORONARY ARTERY DISEASE INVOLVING NATIVE CORONARY ARTERY OF NATIVE HEART WITHOUT ANGINA PECTORIS: Chronic | ICD-10-CM

## 2020-12-09 DIAGNOSIS — K21.9 GASTROESOPHAGEAL REFLUX DISEASE WITHOUT ESOPHAGITIS: ICD-10-CM

## 2020-12-09 DIAGNOSIS — E78.5 DYSLIPIDEMIA: ICD-10-CM

## 2020-12-09 PROCEDURE — 99215 OFFICE O/P EST HI 40 MIN: CPT | Mod: PBBFAC,PO | Performed by: STUDENT IN AN ORGANIZED HEALTH CARE EDUCATION/TRAINING PROGRAM

## 2020-12-09 PROCEDURE — 99397 PER PM REEVAL EST PAT 65+ YR: CPT | Mod: S$PBB,GC,, | Performed by: STUDENT IN AN ORGANIZED HEALTH CARE EDUCATION/TRAINING PROGRAM

## 2020-12-09 PROCEDURE — 99999 PR PBB SHADOW E&M-EST. PATIENT-LVL V: ICD-10-PCS | Mod: PBBFAC,GC,, | Performed by: STUDENT IN AN ORGANIZED HEALTH CARE EDUCATION/TRAINING PROGRAM

## 2020-12-09 PROCEDURE — 99999 PR PBB SHADOW E&M-EST. PATIENT-LVL V: CPT | Mod: PBBFAC,GC,, | Performed by: STUDENT IN AN ORGANIZED HEALTH CARE EDUCATION/TRAINING PROGRAM

## 2020-12-09 PROCEDURE — 99397 PR PREVENTIVE VISIT,EST,65 & OVER: ICD-10-PCS | Mod: S$PBB,GC,, | Performed by: STUDENT IN AN ORGANIZED HEALTH CARE EDUCATION/TRAINING PROGRAM

## 2020-12-09 RX ORDER — ESCITALOPRAM OXALATE 5 MG/1
5 TABLET ORAL DAILY
Qty: 30 TABLET | Refills: 0 | Status: SHIPPED | OUTPATIENT
Start: 2020-12-09 | End: 2021-01-01

## 2020-12-09 NOTE — PROGRESS NOTES
Clinic Note  12/9/2020      Subjective:       Patient ID:  Sharan is a 75 y.o. male being seen for an established visit.    Chief Complaint: Annual Exam    Jordin Allen is a 75 year old male with HTN, COPD, hx of smoking, CAD, and GERD who presents for annual exam.         Vaccines: up to date   Prostate: no issues, previous PSA's wnl   Colonoscopy: due 4/2022  Activities of daily living: active, back playing golf, denies shortness of breath   Mental health: good, anxiety has improved since being on Lexapro. Would like to eventually get off of lexapro as he is felling well.   Falls: no  Urinary incontinence: no  Safety: good  Nutrition: balanced diet  Cholesterol: yearly, continue statin         Review of Systems   Constitutional: Negative for chills, fever and malaise/fatigue.   HENT: Negative for ear pain and sinus pain.    Eyes: Negative for blurred vision and double vision.   Respiratory: Negative for cough, hemoptysis, sputum production, shortness of breath and wheezing.    Cardiovascular: Negative for chest pain, claudication and leg swelling.   Gastrointestinal: Negative for abdominal pain, constipation, diarrhea, heartburn, nausea and vomiting.   Genitourinary: Negative for dysuria, flank pain, frequency and urgency.   Musculoskeletal: Negative for myalgias and neck pain.   Skin: Negative for itching and rash.   Neurological: Negative for dizziness and headaches.   Psychiatric/Behavioral: Negative for depression. The patient is not nervous/anxious.        Past Medical History:   Diagnosis Date    Benign neoplasm of colon 10/1/2013    Bronchitis chronic     CAD (coronary artery disease) 10/31/2013    COPD (chronic obstructive pulmonary disease)     Emphysema of lung     GERD (gastroesophageal reflux disease)     Hx of colonic polyps     Hypertension     NAFLD (nonalcoholic fatty liver disease) 3/27/2017    SHOLA on CPAP     RLS (restless legs syndrome)     Screen for colon cancer 8/30/2013        Family History   Problem Relation Age of Onset    Heart disease Mother     Heart attack Mother     Hypertension Mother     Asthma Neg Hx     Emphysema Neg Hx     Prostate cancer Neg Hx     Cirrhosis Neg Hx         reports that he quit smoking about 8 years ago. His smoking use included cigarettes. He has a 40.00 pack-year smoking history. He has never used smokeless tobacco. He reports current alcohol use. He reports that he does not use drugs.    Medication List with Changes/Refills   New Medications    ESCITALOPRAM OXALATE (LEXAPRO) 5 MG TAB    Take 1 tablet (5 mg total) by mouth once daily.   Current Medications    ALBUTEROL (PROVENTIL/VENTOLIN HFA) 90 MCG/ACTUATION INHALER    Inhale 2 puffs into the lungs every 4 (four) hours as needed for Wheezing.    ALBUTEROL-IPRATROPIUM (DUO-NEB) 2.5 MG-0.5 MG/3 ML NEBULIZER SOLUTION    INHALE 1 VIAL VIA NEBULIZER EVERY 6 HOURS AS NEEDEDILF    AMITRIPTYLINE (ELAVIL) 25 MG TABLET    Take 1 tablet (25 mg total) by mouth once daily.    AMLODIPINE (NORVASC) 5 MG TABLET    Take 1 tablet (5 mg total) by mouth once daily.    ASPIRIN (ECOTRIN) 81 MG EC TABLET    Take 81 mg by mouth once.    ATORVASTATIN (LIPITOR) 80 MG TABLET    TAKE 1 TABLET (80 MG TOTAL) BY MOUTH ONCE DAILY.    BETA-CAROTENE,A, W-C & E/MIN (OCUVITE ORAL)    Take by mouth once daily.     BUDESONIDE-FORMOTEROL 160-4.5 MCG (SYMBICORT) 160-4.5 MCG/ACTUATION HFAA    INHALE 2 PUFFS INTO THE LUNGS EVERY 12 HOURS. RINSE MOUTH AFTER USE    CIPROFLOXACIN-DEXAMETHASONE 0.3-0.1% (CIPRODEX) 0.3-0.1 % DRPS    Place 4 drops into both ears 2 (two) times daily.    DALIRESP 500 MCG TAB    TAKE 1 TABLET BY MOUTH EVERY DAY    ECHINACEA 400 MG CAP    Take by mouth once daily.     FLU VAC 2020 [id number]-IDMDV84S,PF, (FLUAD QUAD 2020-21,65Y UP,,PF,) 60 MCG (15 MCG X 4)/0.5 ML SYRG    Inject 0.5 mLs into the muscle.    FLU VACC MI7589-81,65YR UP, MCG/0.5 ML SYRG    Inject into the muscle.    FLUTICASONE PROPIONATE (FLONASE)  "50 MCG/ACTUATION NASAL SPRAY    INSTILL 1 SPRAY INTO EACH NOSTRIL DAILY    HYDROCHLOROTHIAZIDE (HYDRODIURIL) 25 MG TABLET    Take 0.5 tablets (12.5 mg total) by mouth once daily.    LATANOPROST 0.005 % OPHTHALMIC SOLUTION    Place 1 drop into both eyes once daily.     LOSARTAN (COZAAR) 100 MG TABLET    Take 1 tablet (100 mg total) by mouth once daily.    NITROGLYCERIN (NITROSTAT) 0.4 MG SL TABLET    Place 1 tablet (0.4 mg total) under the tongue every 5 (five) minutes as needed.    OMEPRAZOLE (PRILOSEC) 20 MG CAPSULE    TAKE 1 CAPSULE BY MOUTH EVERY DAY    PREDNISONE (DELTASONE) 5 MG TABLET    TAKE 1 TABLET BY MOUTH EVERY OTHER DAY. TAKE WITH FOOD.    SPIRIVA WITH HANDIHALER 18 MCG INHALATION CAPSULE    INHALE 1 CAPSULE WITH INHALATION DEVICE ONCE DAILY    VARICELLA-ZOSTER GE-AS01B, PF, (SHINGRIX, PF,) 50 MCG/0.5 ML INJECTION    Inject into the muscle.   Discontinued Medications    ESCITALOPRAM OXALATE (LEXAPRO) 5 MG TAB    TAKE 1/2 TABLET BY MOUTH EVERY DAY     Review of patient's allergies indicates:   Allergen Reactions    Brilinta [ticagrelor] Itching    Lisinopril      Other reaction(s): cough    Metoprolol succinate Rash       Patient Active Problem List   Diagnosis    Centrilobular emphysema    GERD (gastroesophageal reflux disease)    HTN (hypertension), benign    Sciatica    SHOLA (obstructive sleep apnea)    Restless leg syndrome    Benign neoplasm of colon    CAD (coronary artery disease)    Rash    Severe obesity    History of smoking 30 or more pack years    Chronic obstructive pulmonary disease    NAFLD (nonalcoholic fatty liver disease)    Adenomatous polyp of colon    Scrotal abscess    Personal history of colonic polyps    Dyslipidemia           Objective:      /88 (BP Location: Right arm, Patient Position: Sitting, BP Method: Medium (Manual))   Pulse 90   Temp 97.5 °F (36.4 °C) (Temporal)   Ht 5' 10" (1.778 m)   Wt 104.5 kg (230 lb 6.1 oz)   SpO2 99%   BMI 33.06 kg/m² " "  Estimated body mass index is 33.06 kg/m² as calculated from the following:    Height as of this encounter: 5' 10" (1.778 m).    Weight as of this encounter: 104.5 kg (230 lb 6.1 oz).      Physical Exam   Constitutional: He is oriented to person, place, and time and well-developed, well-nourished, and in no distress. No distress.   HENT:   Head: Normocephalic and atraumatic.   Eyes: EOM are normal. No scleral icterus.   Neck: Neck supple.   Cardiovascular: Normal rate, regular rhythm and normal heart sounds.   Pulmonary/Chest: Effort normal and breath sounds normal. No respiratory distress. He has no wheezes. He has no rales.   Abdominal: Soft. There is no abdominal tenderness.   Musculoskeletal: Normal range of motion.         General: No edema.   Neurological: He is alert and oriented to person, place, and time.   Skin: Skin is warm and dry. No rash noted. He is not diaphoretic. No erythema. No pallor.   Psychiatric: Mood, memory, affect and judgment normal.         Assessment and Plan:         Patient was seen today for annual exam.    Diagnoses and all orders for this visit:    Annual physical exam  - annual labs reviewed with patient  - vaccinations up to date  - Colonoscopy due 4/2020  - f/u in 6 months     HTN (hypertension), benign  - controlled with Norvasc 5, HCTZ 12.5, and Losartan 100  - SBP at home typically in 120's to 130's  - followed by Cardiology     Coronary artery disease involving native coronary artery of native heart without angina pectoris  Dyslipidemia  - controlled with Lipitor  - denies chest pain  - Lipid panel yearly     Chronic obstructive pulmonary disease, unspecified COPD type  - well controlled with Symbicort and Spiriva   - golfs and denies shortness of breath  - rarely uses rescue inhaler    History of smoking 30 or more pack years  -     CT Chest Lung Screening Low Dose; Future    Gastroesophageal reflux disease without esophagitis  - controlled and continue PPI    Anxiety  -    "  escitalopram oxalate (LEXAPRO) 5 MG Tab - take 1/2 table every other day for 3 weeks  -     Patient doing well and would like to taper off Lexapro             Other Orders Placed This Visit:  Orders Placed This Encounter   Procedures    CT Chest Lung Screening Low Dose             Interview, Assessment, Findings, and Plan discussed with Dr. Mccain.   Follow up in about 6 months (around 6/9/2021), or if symptoms worsen or fail to improve.      Kenny Freeman MD  Internal Medicine

## 2020-12-11 ENCOUNTER — PATIENT MESSAGE (OUTPATIENT)
Dept: OTHER | Facility: OTHER | Age: 75
End: 2020-12-11

## 2020-12-14 ENCOUNTER — HOSPITAL ENCOUNTER (OUTPATIENT)
Dept: RADIOLOGY | Facility: HOSPITAL | Age: 75
Discharge: HOME | End: 2020-12-14
Attending: STUDENT IN AN ORGANIZED HEALTH CARE EDUCATION/TRAINING PROGRAM
Payer: MEDICARE

## 2020-12-14 DIAGNOSIS — Z87.891 HISTORY OF SMOKING 30 OR MORE PACK YEARS: ICD-10-CM

## 2020-12-14 PROCEDURE — G0297 LDCT FOR LUNG CA SCREEN: HCPCS | Mod: TC

## 2020-12-14 PROCEDURE — G0297 CT CHEST LUNG SCREENING LOW DOSE: ICD-10-PCS | Mod: 26,,, | Performed by: RADIOLOGY

## 2020-12-14 PROCEDURE — G0297 LDCT FOR LUNG CA SCREEN: HCPCS | Mod: 26,,, | Performed by: RADIOLOGY

## 2020-12-31 ENCOUNTER — EXTERNAL CHRONIC CARE MANAGEMENT (OUTPATIENT)
Dept: PRIMARY CARE CLINIC | Facility: CLINIC | Age: 75
End: 2020-12-31
Payer: MEDICARE

## 2020-12-31 PROCEDURE — 99490 CHRNC CARE MGMT STAFF 1ST 20: CPT | Mod: S$PBB,,, | Performed by: INTERNAL MEDICINE

## 2020-12-31 PROCEDURE — 99490 CHRNC CARE MGMT STAFF 1ST 20: CPT | Mod: PBBFAC,PO | Performed by: INTERNAL MEDICINE

## 2020-12-31 PROCEDURE — 99490 PR CHRONIC CARE MGMT, 1ST 20 MIN: ICD-10-PCS | Mod: S$PBB,,, | Performed by: INTERNAL MEDICINE

## 2021-01-04 ENCOUNTER — PATIENT MESSAGE (OUTPATIENT)
Dept: ADMINISTRATIVE | Facility: OTHER | Age: 76
End: 2021-01-04

## 2021-01-06 ENCOUNTER — OFFICE VISIT (OUTPATIENT)
Dept: CARDIOLOGY | Facility: CLINIC | Age: 76
End: 2021-01-06
Payer: MEDICARE

## 2021-01-06 VITALS
SYSTOLIC BLOOD PRESSURE: 134 MMHG | DIASTOLIC BLOOD PRESSURE: 67 MMHG | WEIGHT: 232.06 LBS | HEIGHT: 70 IN | HEART RATE: 67 BPM | BODY MASS INDEX: 33.22 KG/M2

## 2021-01-06 DIAGNOSIS — G47.33 OSA (OBSTRUCTIVE SLEEP APNEA): ICD-10-CM

## 2021-01-06 DIAGNOSIS — J43.2 CENTRILOBULAR EMPHYSEMA: Chronic | ICD-10-CM

## 2021-01-06 DIAGNOSIS — E78.5 DYSLIPIDEMIA: ICD-10-CM

## 2021-01-06 DIAGNOSIS — I10 HTN (HYPERTENSION), BENIGN: ICD-10-CM

## 2021-01-06 DIAGNOSIS — I25.10 CORONARY ARTERY DISEASE INVOLVING NATIVE CORONARY ARTERY OF NATIVE HEART WITHOUT ANGINA PECTORIS: Primary | Chronic | ICD-10-CM

## 2021-01-06 DIAGNOSIS — Z87.891 HISTORY OF SMOKING 30 OR MORE PACK YEARS: ICD-10-CM

## 2021-01-06 PROCEDURE — 99999 PR PBB SHADOW E&M-EST. PATIENT-LVL IV: CPT | Mod: PBBFAC,,, | Performed by: INTERNAL MEDICINE

## 2021-01-06 PROCEDURE — 99214 PR OFFICE/OUTPT VISIT, EST, LEVL IV, 30-39 MIN: ICD-10-PCS | Mod: S$PBB,,, | Performed by: INTERNAL MEDICINE

## 2021-01-06 PROCEDURE — 99999 PR PBB SHADOW E&M-EST. PATIENT-LVL IV: ICD-10-PCS | Mod: PBBFAC,,, | Performed by: INTERNAL MEDICINE

## 2021-01-06 PROCEDURE — 99214 OFFICE O/P EST MOD 30 MIN: CPT | Mod: PBBFAC,PO | Performed by: INTERNAL MEDICINE

## 2021-01-06 PROCEDURE — 99214 OFFICE O/P EST MOD 30 MIN: CPT | Mod: S$PBB,,, | Performed by: INTERNAL MEDICINE

## 2021-01-12 ENCOUNTER — IMMUNIZATION (OUTPATIENT)
Dept: INTERNAL MEDICINE | Facility: CLINIC | Age: 76
End: 2021-01-12
Payer: MEDICARE

## 2021-01-12 DIAGNOSIS — Z23 NEED FOR VACCINATION: ICD-10-CM

## 2021-01-12 PROCEDURE — 91300 COVID-19, MRNA, LNP-S, PF, 30 MCG/0.3 ML DOSE VACCINE: CPT | Mod: PBBFAC,PO

## 2021-01-15 RX ORDER — NITROGLYCERIN 0.4 MG/1
0.4 TABLET SUBLINGUAL EVERY 5 MIN PRN
Qty: 100 TABLET | Refills: 3 | Status: SHIPPED | OUTPATIENT
Start: 2021-01-15 | End: 2022-02-09 | Stop reason: SDUPTHER

## 2021-01-31 ENCOUNTER — EXTERNAL CHRONIC CARE MANAGEMENT (OUTPATIENT)
Dept: PRIMARY CARE CLINIC | Facility: CLINIC | Age: 76
End: 2021-01-31
Payer: MEDICARE

## 2021-01-31 PROCEDURE — 99490 CHRNC CARE MGMT STAFF 1ST 20: CPT | Mod: S$PBB,,, | Performed by: INTERNAL MEDICINE

## 2021-01-31 PROCEDURE — 99490 PR CHRONIC CARE MGMT, 1ST 20 MIN: ICD-10-PCS | Mod: S$PBB,,, | Performed by: INTERNAL MEDICINE

## 2021-01-31 PROCEDURE — 99490 CHRNC CARE MGMT STAFF 1ST 20: CPT | Mod: PBBFAC,PO | Performed by: INTERNAL MEDICINE

## 2021-02-02 ENCOUNTER — IMMUNIZATION (OUTPATIENT)
Dept: INTERNAL MEDICINE | Facility: CLINIC | Age: 76
End: 2021-02-02
Payer: MEDICARE

## 2021-02-02 DIAGNOSIS — Z23 NEED FOR VACCINATION: Primary | ICD-10-CM

## 2021-02-02 PROCEDURE — 0002A COVID-19, MRNA, LNP-S, PF, 30 MCG/0.3 ML DOSE VACCINE: CPT | Mod: PBBFAC,PO

## 2021-02-02 PROCEDURE — 91300 COVID-19, MRNA, LNP-S, PF, 30 MCG/0.3 ML DOSE VACCINE: CPT | Mod: PBBFAC,PO

## 2021-02-28 ENCOUNTER — EXTERNAL CHRONIC CARE MANAGEMENT (OUTPATIENT)
Dept: PRIMARY CARE CLINIC | Facility: CLINIC | Age: 76
End: 2021-02-28
Payer: MEDICARE

## 2021-02-28 PROCEDURE — 99490 PR CHRONIC CARE MGMT, 1ST 20 MIN: ICD-10-PCS | Mod: S$PBB,,, | Performed by: INTERNAL MEDICINE

## 2021-02-28 PROCEDURE — 99490 CHRNC CARE MGMT STAFF 1ST 20: CPT | Mod: PBBFAC,PO | Performed by: INTERNAL MEDICINE

## 2021-02-28 PROCEDURE — 99490 CHRNC CARE MGMT STAFF 1ST 20: CPT | Mod: S$PBB,,, | Performed by: INTERNAL MEDICINE

## 2021-03-04 DIAGNOSIS — J44.1 CHRONIC OBSTRUCTIVE PULMONARY DISEASE WITH ACUTE EXACERBATION: ICD-10-CM

## 2021-03-04 DIAGNOSIS — J43.2 CENTRILOBULAR EMPHYSEMA: ICD-10-CM

## 2021-03-04 RX ORDER — ROFLUMILAST 500 UG/1
1 TABLET ORAL DAILY
Qty: 90 TABLET | Refills: 3 | Status: SHIPPED | OUTPATIENT
Start: 2021-03-04 | End: 2021-03-05

## 2021-03-18 ENCOUNTER — PATIENT MESSAGE (OUTPATIENT)
Dept: RESEARCH | Facility: HOSPITAL | Age: 76
End: 2021-03-18

## 2021-03-26 ENCOUNTER — PATIENT MESSAGE (OUTPATIENT)
Dept: RESEARCH | Facility: HOSPITAL | Age: 76
End: 2021-03-26

## 2021-03-31 ENCOUNTER — EXTERNAL CHRONIC CARE MANAGEMENT (OUTPATIENT)
Dept: PRIMARY CARE CLINIC | Facility: CLINIC | Age: 76
End: 2021-03-31
Payer: MEDICARE

## 2021-03-31 PROCEDURE — 99490 CHRNC CARE MGMT STAFF 1ST 20: CPT | Mod: S$PBB,,, | Performed by: INTERNAL MEDICINE

## 2021-03-31 PROCEDURE — 99490 CHRNC CARE MGMT STAFF 1ST 20: CPT | Mod: PBBFAC,PO | Performed by: INTERNAL MEDICINE

## 2021-03-31 PROCEDURE — 99490 PR CHRONIC CARE MGMT, 1ST 20 MIN: ICD-10-PCS | Mod: S$PBB,,, | Performed by: INTERNAL MEDICINE

## 2021-04-23 ENCOUNTER — PATIENT MESSAGE (OUTPATIENT)
Dept: INTERNAL MEDICINE | Facility: CLINIC | Age: 76
End: 2021-04-23

## 2021-04-26 ENCOUNTER — PATIENT MESSAGE (OUTPATIENT)
Dept: INTERNAL MEDICINE | Facility: CLINIC | Age: 76
End: 2021-04-26

## 2021-04-30 ENCOUNTER — EXTERNAL CHRONIC CARE MANAGEMENT (OUTPATIENT)
Dept: PRIMARY CARE CLINIC | Facility: CLINIC | Age: 76
End: 2021-04-30
Payer: MEDICARE

## 2021-04-30 PROCEDURE — 99490 CHRNC CARE MGMT STAFF 1ST 20: CPT | Mod: S$PBB,,, | Performed by: INTERNAL MEDICINE

## 2021-04-30 PROCEDURE — 99490 PR CHRONIC CARE MGMT, 1ST 20 MIN: ICD-10-PCS | Mod: S$PBB,,, | Performed by: INTERNAL MEDICINE

## 2021-04-30 PROCEDURE — 99490 CHRNC CARE MGMT STAFF 1ST 20: CPT | Mod: PBBFAC,PO | Performed by: INTERNAL MEDICINE

## 2021-05-31 ENCOUNTER — EXTERNAL CHRONIC CARE MANAGEMENT (OUTPATIENT)
Dept: PRIMARY CARE CLINIC | Facility: CLINIC | Age: 76
End: 2021-05-31
Payer: MEDICARE

## 2021-05-31 PROCEDURE — 99490 CHRNC CARE MGMT STAFF 1ST 20: CPT | Mod: S$PBB,,, | Performed by: INTERNAL MEDICINE

## 2021-05-31 PROCEDURE — 99490 PR CHRONIC CARE MGMT, 1ST 20 MIN: ICD-10-PCS | Mod: S$PBB,,, | Performed by: INTERNAL MEDICINE

## 2021-05-31 PROCEDURE — 99439 CHRNC CARE MGMT STAF EA ADDL: CPT | Mod: PBBFAC,PO | Performed by: INTERNAL MEDICINE

## 2021-05-31 PROCEDURE — 99439 PR CHRONIC CARE MGMT, EA ADDTL 20 MIN: ICD-10-PCS | Mod: S$PBB,,, | Performed by: INTERNAL MEDICINE

## 2021-05-31 PROCEDURE — 99490 CHRNC CARE MGMT STAFF 1ST 20: CPT | Mod: PBBFAC,PO | Performed by: INTERNAL MEDICINE

## 2021-05-31 PROCEDURE — 99439 CHRNC CARE MGMT STAF EA ADDL: CPT | Mod: S$PBB,,, | Performed by: INTERNAL MEDICINE

## 2021-06-27 ENCOUNTER — PATIENT MESSAGE (OUTPATIENT)
Dept: CARDIOLOGY | Facility: CLINIC | Age: 76
End: 2021-06-27

## 2021-06-28 RX ORDER — ATORVASTATIN CALCIUM 80 MG/1
TABLET, FILM COATED ORAL
Qty: 90 TABLET | Refills: 3 | Status: SHIPPED | OUTPATIENT
Start: 2021-06-28 | End: 2022-06-20 | Stop reason: SDUPTHER

## 2021-06-30 ENCOUNTER — EXTERNAL CHRONIC CARE MANAGEMENT (OUTPATIENT)
Dept: PRIMARY CARE CLINIC | Facility: CLINIC | Age: 76
End: 2021-06-30
Payer: MEDICARE

## 2021-06-30 PROCEDURE — 99490 CHRNC CARE MGMT STAFF 1ST 20: CPT | Mod: S$PBB,,, | Performed by: INTERNAL MEDICINE

## 2021-06-30 PROCEDURE — 99490 CHRNC CARE MGMT STAFF 1ST 20: CPT | Mod: PBBFAC,PO | Performed by: INTERNAL MEDICINE

## 2021-06-30 PROCEDURE — 99490 PR CHRONIC CARE MGMT, 1ST 20 MIN: ICD-10-PCS | Mod: S$PBB,,, | Performed by: INTERNAL MEDICINE

## 2021-07-18 DIAGNOSIS — J43.2 CENTRILOBULAR EMPHYSEMA: ICD-10-CM

## 2021-07-18 DIAGNOSIS — J43.2 CENTRILOBULAR EMPHYSEMA: Primary | ICD-10-CM

## 2021-07-19 RX ORDER — ALBUTEROL SULFATE 90 UG/1
2 AEROSOL, METERED RESPIRATORY (INHALATION) EVERY 4 HOURS PRN
Qty: 24 G | Refills: 3 | Status: SHIPPED | OUTPATIENT
Start: 2021-07-19 | End: 2023-01-05

## 2021-07-19 RX ORDER — IPRATROPIUM BROMIDE AND ALBUTEROL SULFATE 2.5; .5 MG/3ML; MG/3ML
SOLUTION RESPIRATORY (INHALATION)
Qty: 90 ML | Refills: 11 | Status: ON HOLD | OUTPATIENT
Start: 2021-07-19 | End: 2022-12-06 | Stop reason: SDUPTHER

## 2021-07-20 ENCOUNTER — TELEPHONE (OUTPATIENT)
Dept: INTERNAL MEDICINE | Facility: CLINIC | Age: 76
End: 2021-07-20

## 2021-07-20 DIAGNOSIS — E78.5 DYSLIPIDEMIA: ICD-10-CM

## 2021-07-20 DIAGNOSIS — I10 HTN (HYPERTENSION), BENIGN: Primary | ICD-10-CM

## 2021-07-31 ENCOUNTER — EXTERNAL CHRONIC CARE MANAGEMENT (OUTPATIENT)
Dept: PRIMARY CARE CLINIC | Facility: CLINIC | Age: 76
End: 2021-07-31
Payer: MEDICARE

## 2021-07-31 PROCEDURE — 99439 PR CHRONIC CARE MGMT, EA ADDTL 20 MIN: ICD-10-PCS | Mod: S$PBB,,, | Performed by: INTERNAL MEDICINE

## 2021-07-31 PROCEDURE — 99490 CHRNC CARE MGMT STAFF 1ST 20: CPT | Mod: S$PBB,,, | Performed by: INTERNAL MEDICINE

## 2021-07-31 PROCEDURE — 99490 PR CHRONIC CARE MGMT, 1ST 20 MIN: ICD-10-PCS | Mod: S$PBB,,, | Performed by: INTERNAL MEDICINE

## 2021-07-31 PROCEDURE — 99490 CHRNC CARE MGMT STAFF 1ST 20: CPT | Mod: PBBFAC,PO | Performed by: INTERNAL MEDICINE

## 2021-07-31 PROCEDURE — 99439 CHRNC CARE MGMT STAF EA ADDL: CPT | Mod: S$PBB,,, | Performed by: INTERNAL MEDICINE

## 2021-07-31 PROCEDURE — 99439 CHRNC CARE MGMT STAF EA ADDL: CPT | Mod: PBBFAC,PO | Performed by: INTERNAL MEDICINE

## 2021-08-31 ENCOUNTER — EXTERNAL CHRONIC CARE MANAGEMENT (OUTPATIENT)
Dept: PRIMARY CARE CLINIC | Facility: CLINIC | Age: 76
End: 2021-08-31
Payer: MEDICARE

## 2021-08-31 PROCEDURE — 99490 CHRNC CARE MGMT STAFF 1ST 20: CPT | Mod: PBBFAC,PO | Performed by: INTERNAL MEDICINE

## 2021-08-31 PROCEDURE — 99490 PR CHRONIC CARE MGMT, 1ST 20 MIN: ICD-10-PCS | Mod: S$PBB,,, | Performed by: INTERNAL MEDICINE

## 2021-08-31 PROCEDURE — 99490 CHRNC CARE MGMT STAFF 1ST 20: CPT | Mod: S$PBB,,, | Performed by: INTERNAL MEDICINE

## 2021-09-30 ENCOUNTER — EXTERNAL CHRONIC CARE MANAGEMENT (OUTPATIENT)
Dept: PRIMARY CARE CLINIC | Facility: CLINIC | Age: 76
End: 2021-09-30
Payer: MEDICARE

## 2021-09-30 PROCEDURE — 99490 PR CHRONIC CARE MGMT, 1ST 20 MIN: ICD-10-PCS | Mod: S$PBB,,, | Performed by: INTERNAL MEDICINE

## 2021-09-30 PROCEDURE — 99490 CHRNC CARE MGMT STAFF 1ST 20: CPT | Mod: S$PBB,,, | Performed by: INTERNAL MEDICINE

## 2021-09-30 PROCEDURE — 99490 CHRNC CARE MGMT STAFF 1ST 20: CPT | Mod: PBBFAC,PO | Performed by: INTERNAL MEDICINE

## 2021-10-04 ENCOUNTER — PATIENT MESSAGE (OUTPATIENT)
Dept: INTERNAL MEDICINE | Facility: CLINIC | Age: 76
End: 2021-10-04

## 2021-10-13 RX ORDER — HYDROCHLOROTHIAZIDE 25 MG/1
12.5 TABLET ORAL DAILY
Qty: 30 TABLET | Refills: 6 | Status: SHIPPED | OUTPATIENT
Start: 2021-10-13 | End: 2022-01-03 | Stop reason: SDUPTHER

## 2021-10-21 ENCOUNTER — IMMUNIZATION (OUTPATIENT)
Dept: PHARMACY | Facility: CLINIC | Age: 76
End: 2021-10-21
Payer: MEDICARE

## 2021-10-26 ENCOUNTER — PATIENT MESSAGE (OUTPATIENT)
Dept: CARDIOLOGY | Facility: CLINIC | Age: 76
End: 2021-10-26
Payer: MEDICARE

## 2021-10-31 ENCOUNTER — EXTERNAL CHRONIC CARE MANAGEMENT (OUTPATIENT)
Dept: PRIMARY CARE CLINIC | Facility: CLINIC | Age: 76
End: 2021-10-31
Payer: MEDICARE

## 2021-10-31 PROCEDURE — 99490 PR CHRONIC CARE MGMT, 1ST 20 MIN: ICD-10-PCS | Mod: S$PBB,,, | Performed by: INTERNAL MEDICINE

## 2021-10-31 PROCEDURE — 99490 CHRNC CARE MGMT STAFF 1ST 20: CPT | Mod: PBBFAC,PO | Performed by: INTERNAL MEDICINE

## 2021-10-31 PROCEDURE — 99490 CHRNC CARE MGMT STAFF 1ST 20: CPT | Mod: S$PBB,,, | Performed by: INTERNAL MEDICINE

## 2021-11-01 DIAGNOSIS — K21.9 GASTROESOPHAGEAL REFLUX DISEASE WITHOUT ESOPHAGITIS: ICD-10-CM

## 2021-11-03 RX ORDER — OMEPRAZOLE 20 MG/1
20 CAPSULE, DELAYED RELEASE ORAL DAILY
Qty: 90 CAPSULE | Refills: 3 | Status: SHIPPED | OUTPATIENT
Start: 2021-11-03 | End: 2022-11-07

## 2021-11-22 ENCOUNTER — PATIENT MESSAGE (OUTPATIENT)
Dept: INTERNAL MEDICINE | Facility: CLINIC | Age: 76
End: 2021-11-22
Payer: MEDICARE

## 2021-11-30 ENCOUNTER — PATIENT MESSAGE (OUTPATIENT)
Dept: CARDIOLOGY | Facility: CLINIC | Age: 76
End: 2021-11-30
Payer: MEDICARE

## 2021-11-30 ENCOUNTER — EXTERNAL CHRONIC CARE MANAGEMENT (OUTPATIENT)
Dept: PRIMARY CARE CLINIC | Facility: CLINIC | Age: 76
End: 2021-11-30
Payer: MEDICARE

## 2021-11-30 PROCEDURE — 99490 PR CHRONIC CARE MGMT, 1ST 20 MIN: ICD-10-PCS | Mod: S$PBB,,, | Performed by: INTERNAL MEDICINE

## 2021-11-30 PROCEDURE — 99490 CHRNC CARE MGMT STAFF 1ST 20: CPT | Mod: PBBFAC,PO | Performed by: INTERNAL MEDICINE

## 2021-11-30 PROCEDURE — 99490 CHRNC CARE MGMT STAFF 1ST 20: CPT | Mod: S$PBB,,, | Performed by: INTERNAL MEDICINE

## 2021-11-30 RX ORDER — AMLODIPINE BESYLATE 5 MG/1
5 TABLET ORAL DAILY
Qty: 90 TABLET | Refills: 3 | Status: SHIPPED | OUTPATIENT
Start: 2021-11-30 | End: 2022-12-07 | Stop reason: SDUPTHER

## 2021-12-02 ENCOUNTER — LAB VISIT (OUTPATIENT)
Dept: LAB | Facility: HOSPITAL | Age: 76
End: 2021-12-02
Attending: INTERNAL MEDICINE
Payer: MEDICARE

## 2021-12-02 DIAGNOSIS — E78.5 DYSLIPIDEMIA: ICD-10-CM

## 2021-12-02 DIAGNOSIS — I10 HTN (HYPERTENSION), BENIGN: ICD-10-CM

## 2021-12-02 LAB
ALBUMIN SERPL BCP-MCNC: 3.7 G/DL (ref 3.5–5.2)
ALP SERPL-CCNC: 115 U/L (ref 55–135)
ALT SERPL W/O P-5'-P-CCNC: 29 U/L (ref 10–44)
ANION GAP SERPL CALC-SCNC: 10 MMOL/L (ref 8–16)
AST SERPL-CCNC: 20 U/L (ref 10–40)
BASOPHILS # BLD AUTO: 0.06 K/UL (ref 0–0.2)
BASOPHILS NFR BLD: 0.6 % (ref 0–1.9)
BILIRUB SERPL-MCNC: 1 MG/DL (ref 0.1–1)
BUN SERPL-MCNC: 24 MG/DL (ref 8–23)
CALCIUM SERPL-MCNC: 9.7 MG/DL (ref 8.7–10.5)
CHLORIDE SERPL-SCNC: 103 MMOL/L (ref 95–110)
CHOLEST SERPL-MCNC: 102 MG/DL (ref 120–199)
CHOLEST/HDLC SERPL: 2.4 {RATIO} (ref 2–5)
CO2 SERPL-SCNC: 28 MMOL/L (ref 23–29)
CREAT SERPL-MCNC: 1.1 MG/DL (ref 0.5–1.4)
DIFFERENTIAL METHOD: ABNORMAL
EOSINOPHIL # BLD AUTO: 0.3 K/UL (ref 0–0.5)
EOSINOPHIL NFR BLD: 3.2 % (ref 0–8)
ERYTHROCYTE [DISTWIDTH] IN BLOOD BY AUTOMATED COUNT: 14.4 % (ref 11.5–14.5)
EST. GFR  (AFRICAN AMERICAN): >60 ML/MIN/1.73 M^2
EST. GFR  (NON AFRICAN AMERICAN): >60 ML/MIN/1.73 M^2
GLUCOSE SERPL-MCNC: 117 MG/DL (ref 70–110)
HCT VFR BLD AUTO: 44.7 % (ref 40–54)
HDLC SERPL-MCNC: 42 MG/DL (ref 40–75)
HDLC SERPL: 41.2 % (ref 20–50)
HGB BLD-MCNC: 13.8 G/DL (ref 14–18)
IMM GRANULOCYTES # BLD AUTO: 0.08 K/UL (ref 0–0.04)
IMM GRANULOCYTES NFR BLD AUTO: 0.8 % (ref 0–0.5)
LDLC SERPL CALC-MCNC: 49.2 MG/DL (ref 63–159)
LYMPHOCYTES # BLD AUTO: 2.4 K/UL (ref 1–4.8)
LYMPHOCYTES NFR BLD: 22.8 % (ref 18–48)
MCH RBC QN AUTO: 26.2 PG (ref 27–31)
MCHC RBC AUTO-ENTMCNC: 30.9 G/DL (ref 32–36)
MCV RBC AUTO: 85 FL (ref 82–98)
MONOCYTES # BLD AUTO: 0.9 K/UL (ref 0.3–1)
MONOCYTES NFR BLD: 8.1 % (ref 4–15)
NEUTROPHILS # BLD AUTO: 6.8 K/UL (ref 1.8–7.7)
NEUTROPHILS NFR BLD: 64.5 % (ref 38–73)
NONHDLC SERPL-MCNC: 60 MG/DL
NRBC BLD-RTO: 0 /100 WBC
PLATELET # BLD AUTO: 195 K/UL (ref 150–450)
PMV BLD AUTO: 11.2 FL (ref 9.2–12.9)
POTASSIUM SERPL-SCNC: 4.5 MMOL/L (ref 3.5–5.1)
PROT SERPL-MCNC: 6.9 G/DL (ref 6–8.4)
RBC # BLD AUTO: 5.27 M/UL (ref 4.6–6.2)
SODIUM SERPL-SCNC: 141 MMOL/L (ref 136–145)
TRIGL SERPL-MCNC: 54 MG/DL (ref 30–150)
TSH SERPL DL<=0.005 MIU/L-ACNC: 0.86 UIU/ML (ref 0.4–4)
WBC # BLD AUTO: 10.52 K/UL (ref 3.9–12.7)

## 2021-12-02 PROCEDURE — 84443 ASSAY THYROID STIM HORMONE: CPT | Performed by: INTERNAL MEDICINE

## 2021-12-02 PROCEDURE — 85025 COMPLETE CBC W/AUTO DIFF WBC: CPT | Performed by: INTERNAL MEDICINE

## 2021-12-02 PROCEDURE — 80053 COMPREHEN METABOLIC PANEL: CPT | Performed by: INTERNAL MEDICINE

## 2021-12-02 PROCEDURE — 80061 LIPID PANEL: CPT | Performed by: INTERNAL MEDICINE

## 2021-12-02 PROCEDURE — 36415 COLL VENOUS BLD VENIPUNCTURE: CPT | Mod: PO | Performed by: INTERNAL MEDICINE

## 2021-12-09 ENCOUNTER — OFFICE VISIT (OUTPATIENT)
Dept: INTERNAL MEDICINE | Facility: CLINIC | Age: 76
End: 2021-12-09
Payer: MEDICARE

## 2021-12-09 ENCOUNTER — LAB VISIT (OUTPATIENT)
Dept: LAB | Facility: HOSPITAL | Age: 76
End: 2021-12-09
Attending: INTERNAL MEDICINE
Payer: MEDICARE

## 2021-12-09 VITALS
SYSTOLIC BLOOD PRESSURE: 130 MMHG | DIASTOLIC BLOOD PRESSURE: 82 MMHG | HEIGHT: 70 IN | RESPIRATION RATE: 18 BRPM | BODY MASS INDEX: 33.83 KG/M2 | OXYGEN SATURATION: 96 % | WEIGHT: 236.31 LBS | TEMPERATURE: 98 F | HEART RATE: 85 BPM

## 2021-12-09 DIAGNOSIS — G47.33 OSA (OBSTRUCTIVE SLEEP APNEA): ICD-10-CM

## 2021-12-09 DIAGNOSIS — Z12.5 PROSTATE CANCER SCREENING: ICD-10-CM

## 2021-12-09 DIAGNOSIS — I25.10 CORONARY ARTERY DISEASE INVOLVING NATIVE CORONARY ARTERY OF NATIVE HEART WITHOUT ANGINA PECTORIS: ICD-10-CM

## 2021-12-09 DIAGNOSIS — J44.9 CHRONIC OBSTRUCTIVE PULMONARY DISEASE, UNSPECIFIED COPD TYPE: ICD-10-CM

## 2021-12-09 DIAGNOSIS — D12.6 ADENOMATOUS POLYP OF COLON, UNSPECIFIED PART OF COLON: ICD-10-CM

## 2021-12-09 DIAGNOSIS — E78.5 DYSLIPIDEMIA: ICD-10-CM

## 2021-12-09 DIAGNOSIS — I10 HTN (HYPERTENSION), BENIGN: Primary | ICD-10-CM

## 2021-12-09 DIAGNOSIS — Z87.891 HX OF TOBACCO USE, PRESENTING HAZARDS TO HEALTH: ICD-10-CM

## 2021-12-09 LAB — COMPLEXED PSA SERPL-MCNC: 2 NG/ML (ref 0–4)

## 2021-12-09 PROCEDURE — 99999 PR PBB SHADOW E&M-EST. PATIENT-LVL V: CPT | Mod: PBBFAC,,, | Performed by: INTERNAL MEDICINE

## 2021-12-09 PROCEDURE — 84153 ASSAY OF PSA TOTAL: CPT | Performed by: INTERNAL MEDICINE

## 2021-12-09 PROCEDURE — 99215 OFFICE O/P EST HI 40 MIN: CPT | Mod: PBBFAC,PO | Performed by: INTERNAL MEDICINE

## 2021-12-09 PROCEDURE — 36415 COLL VENOUS BLD VENIPUNCTURE: CPT | Mod: PO | Performed by: INTERNAL MEDICINE

## 2021-12-09 PROCEDURE — 99499 UNLISTED E&M SERVICE: CPT | Mod: S$PBB,,, | Performed by: INTERNAL MEDICINE

## 2021-12-14 ENCOUNTER — HOSPITAL ENCOUNTER (OUTPATIENT)
Dept: RADIOLOGY | Facility: HOSPITAL | Age: 76
Discharge: HOME | End: 2021-12-14
Attending: INTERNAL MEDICINE
Payer: MEDICARE

## 2021-12-14 DIAGNOSIS — Z87.891 HX OF TOBACCO USE, PRESENTING HAZARDS TO HEALTH: ICD-10-CM

## 2021-12-14 PROCEDURE — 71271 CT THORAX LUNG CANCER SCR C-: CPT | Mod: 26,,, | Performed by: RADIOLOGY

## 2021-12-14 PROCEDURE — 71271 CT CHEST LUNG SCREENING LOW DOSE: ICD-10-PCS | Mod: 26,,, | Performed by: RADIOLOGY

## 2021-12-14 PROCEDURE — 71271 CT THORAX LUNG CANCER SCR C-: CPT | Mod: TC

## 2021-12-31 ENCOUNTER — EXTERNAL CHRONIC CARE MANAGEMENT (OUTPATIENT)
Dept: PRIMARY CARE CLINIC | Facility: CLINIC | Age: 76
End: 2021-12-31
Payer: MEDICARE

## 2021-12-31 PROCEDURE — 99490 CHRNC CARE MGMT STAFF 1ST 20: CPT | Mod: PBBFAC,PO | Performed by: INTERNAL MEDICINE

## 2021-12-31 PROCEDURE — 99490 PR CHRONIC CARE MGMT, 1ST 20 MIN: ICD-10-PCS | Mod: S$PBB,,, | Performed by: INTERNAL MEDICINE

## 2021-12-31 PROCEDURE — 99490 CHRNC CARE MGMT STAFF 1ST 20: CPT | Mod: S$PBB,,, | Performed by: INTERNAL MEDICINE

## 2022-01-03 ENCOUNTER — PATIENT MESSAGE (OUTPATIENT)
Dept: INTERNAL MEDICINE | Facility: CLINIC | Age: 77
End: 2022-01-03
Payer: MEDICARE

## 2022-01-03 RX ORDER — LOSARTAN POTASSIUM 100 MG/1
100 TABLET ORAL DAILY
Qty: 90 TABLET | Refills: 2 | Status: SHIPPED | OUTPATIENT
Start: 2022-01-03 | End: 2022-08-05

## 2022-01-03 RX ORDER — HYDROCHLOROTHIAZIDE 25 MG/1
12.5 TABLET ORAL DAILY
Qty: 30 TABLET | Refills: 6 | Status: SHIPPED | OUTPATIENT
Start: 2022-01-03 | End: 2023-01-13 | Stop reason: SDUPTHER

## 2022-01-03 NOTE — TELEPHONE ENCOUNTER
If he is feeling back to normal he probably does not need ant additional therapy.  Short term retesting is not indicate or advised.  Follow the self quarantine guideline

## 2022-01-13 DIAGNOSIS — J44.1 CHRONIC OBSTRUCTIVE PULMONARY DISEASE WITH ACUTE EXACERBATION: ICD-10-CM

## 2022-01-13 DIAGNOSIS — J43.2 CENTRILOBULAR EMPHYSEMA: ICD-10-CM

## 2022-01-13 RX ORDER — ROFLUMILAST 500 UG/1
1 TABLET ORAL DAILY
Qty: 90 TABLET | Refills: 3 | Status: SHIPPED | OUTPATIENT
Start: 2022-01-13 | End: 2023-01-26

## 2022-01-18 ENCOUNTER — TELEPHONE (OUTPATIENT)
Dept: AUDIOLOGY | Facility: CLINIC | Age: 77
End: 2022-01-18
Payer: MEDICARE

## 2022-01-18 DIAGNOSIS — H90.3 SENSORINEURAL HEARING LOSS, BILATERAL: Primary | ICD-10-CM

## 2022-01-20 ENCOUNTER — CLINICAL SUPPORT (OUTPATIENT)
Dept: AUDIOLOGY | Facility: CLINIC | Age: 77
End: 2022-01-20
Payer: MEDICARE

## 2022-01-20 DIAGNOSIS — H90.3 SENSORINEURAL HEARING LOSS, BILATERAL: Primary | ICD-10-CM

## 2022-01-20 DIAGNOSIS — H90.3 SENSORINEURAL HEARING LOSS, BILATERAL: ICD-10-CM

## 2022-01-20 PROCEDURE — 92567 TYMPANOMETRY: CPT | Mod: PBBFAC | Performed by: AUDIOLOGIST

## 2022-01-20 PROCEDURE — 99499 UNLISTED E&M SERVICE: CPT | Mod: S$PBB,,, | Performed by: AUDIOLOGIST

## 2022-01-20 PROCEDURE — 99999 PR PBB SHADOW E&M-EST. PATIENT-LVL I: ICD-10-PCS | Mod: PBBFAC,,, | Performed by: AUDIOLOGIST

## 2022-01-20 PROCEDURE — 99499 NO LOS: ICD-10-PCS | Mod: S$PBB,,, | Performed by: AUDIOLOGIST

## 2022-01-20 PROCEDURE — 92557 COMPREHENSIVE HEARING TEST: CPT | Mod: PBBFAC | Performed by: AUDIOLOGIST

## 2022-01-20 PROCEDURE — 99999 PR PBB SHADOW E&M-EST. PATIENT-LVL I: CPT | Mod: PBBFAC,,, | Performed by: AUDIOLOGIST

## 2022-01-20 PROCEDURE — 99211 OFF/OP EST MAY X REQ PHY/QHP: CPT | Mod: PBBFAC | Performed by: AUDIOLOGIST

## 2022-01-20 NOTE — PROGRESS NOTES
Jordin Allen Jr. was seen today for a hearing aid cleaning and check.  Pt reported that his aid(s) are were not working and that he has not worn them for a few years.  Aid(s) cleaned and checked.  Initially aids would not turn on.  A hard reset was completed and the hearing aids were then powering on.  Listening check confirmed aid(s) working well.  Aids were reprogrammed to accommodate today's hearing test results.  Proper cleaning and use was discussed.  Pt will follow up annually unless otherwise needed.    Hearing Aid Information:  Niurka Garcia [id number]  [employer]  Invoice Date: 6/26/17   Lt SN [id number]  Rt SN [id number]  : 2xS  Dome: [employer]  Warranty Exp 9/23/20    Recommendations:  1) Return in January 2023 for annual hearing testing and hearing aid check  2) Contact office if issues arise with hearing aids  3) Otolaryngology follow up if left ear issues worsen

## 2022-01-31 ENCOUNTER — EXTERNAL CHRONIC CARE MANAGEMENT (OUTPATIENT)
Dept: PRIMARY CARE CLINIC | Facility: CLINIC | Age: 77
End: 2022-01-31
Payer: MEDICARE

## 2022-01-31 PROCEDURE — 99490 CHRNC CARE MGMT STAFF 1ST 20: CPT | Mod: S$PBB,,, | Performed by: INTERNAL MEDICINE

## 2022-01-31 PROCEDURE — 99490 PR CHRONIC CARE MGMT, 1ST 20 MIN: ICD-10-PCS | Mod: S$PBB,,, | Performed by: INTERNAL MEDICINE

## 2022-01-31 PROCEDURE — 99490 CHRNC CARE MGMT STAFF 1ST 20: CPT | Mod: PBBFAC,PO | Performed by: INTERNAL MEDICINE

## 2022-02-02 ENCOUNTER — PATIENT MESSAGE (OUTPATIENT)
Dept: INTERNAL MEDICINE | Facility: CLINIC | Age: 77
End: 2022-02-02
Payer: MEDICARE

## 2022-02-02 DIAGNOSIS — G47.33 OSA (OBSTRUCTIVE SLEEP APNEA): Primary | ICD-10-CM

## 2022-02-07 ENCOUNTER — PATIENT OUTREACH (OUTPATIENT)
Dept: ADMINISTRATIVE | Facility: OTHER | Age: 77
End: 2022-02-07
Payer: MEDICARE

## 2022-02-09 ENCOUNTER — OFFICE VISIT (OUTPATIENT)
Dept: CARDIOLOGY | Facility: CLINIC | Age: 77
End: 2022-02-09
Payer: MEDICARE

## 2022-02-09 VITALS
DIASTOLIC BLOOD PRESSURE: 84 MMHG | BODY MASS INDEX: 34.22 KG/M2 | HEIGHT: 70 IN | WEIGHT: 239 LBS | HEART RATE: 84 BPM | SYSTOLIC BLOOD PRESSURE: 122 MMHG

## 2022-02-09 DIAGNOSIS — E78.5 DYSLIPIDEMIA: ICD-10-CM

## 2022-02-09 DIAGNOSIS — I25.10 CORONARY ARTERY DISEASE INVOLVING NATIVE CORONARY ARTERY OF NATIVE HEART WITHOUT ANGINA PECTORIS: Primary | Chronic | ICD-10-CM

## 2022-02-09 DIAGNOSIS — G47.33 OSA (OBSTRUCTIVE SLEEP APNEA): ICD-10-CM

## 2022-02-09 DIAGNOSIS — I10 HTN (HYPERTENSION), BENIGN: ICD-10-CM

## 2022-02-09 PROCEDURE — 99999 PR PBB SHADOW E&M-EST. PATIENT-LVL V: CPT | Mod: PBBFAC,,, | Performed by: INTERNAL MEDICINE

## 2022-02-09 PROCEDURE — 99999 PR PBB SHADOW E&M-EST. PATIENT-LVL V: ICD-10-PCS | Mod: PBBFAC,,, | Performed by: INTERNAL MEDICINE

## 2022-02-09 PROCEDURE — 99214 PR OFFICE/OUTPT VISIT, EST, LEVL IV, 30-39 MIN: ICD-10-PCS | Mod: S$PBB,,, | Performed by: INTERNAL MEDICINE

## 2022-02-09 PROCEDURE — 99214 OFFICE O/P EST MOD 30 MIN: CPT | Mod: S$PBB,,, | Performed by: INTERNAL MEDICINE

## 2022-02-09 PROCEDURE — 99215 OFFICE O/P EST HI 40 MIN: CPT | Mod: PBBFAC,PO | Performed by: INTERNAL MEDICINE

## 2022-02-09 RX ORDER — NITROGLYCERIN 0.4 MG/1
0.4 TABLET SUBLINGUAL EVERY 5 MIN PRN
Qty: 100 TABLET | Refills: 3 | Status: SHIPPED | OUTPATIENT
Start: 2022-02-09

## 2022-02-09 NOTE — PROGRESS NOTES
Subjective:   Patient ID:  Jordin Allen Jr. is a 76 y.o. male who presents for follow up of Coronary Artery Disease      HPI: Yearly routine f/u.    He is doing well with no new symptoms or cardiovascular complaints and no change in exercise capacity.  He denies chest discomfort, MONTIEL, palpitations, PND/orthopnea, lightheadedness and syncope.    No bleeding, hematochezia, or melena.  No TIA/stroke symtoms.    Fully vaccinated and boosted (8/26/21) against COVID.    Jan 2021 HPI: Yearly routine f/u.  He's down 15# since last visit.     He is doing well with no new symptoms or cardiovascular complaints and no change in exercise capacity.  He denies chest discomfort, MONTIEL, palpitations, PND/orthopnea, lightheadedness and syncope.     No bleeding, hematochezia, or melena.  No TIA/stroke symtoms.     No F/C/cough/diarrhea/anosmia.        Dec 2019 HPI: Yearly routine f/u.  He's stopped playing golf as much as he used to because his partners all got seriously ill on him.     He's up 9# since last year.     He otherwise is doing well with no new symptoms or cardiovascular complaints and no change in exercise capacity.  He denies chest discomfort, MONTIEL, palpitations, PND/orthopnea, lightheadedness and syncope.        Dec 2018 HPI: Here a little early for yearly routine f/u.  Still playing golf 4-5 times per week at [facility] without any problems.     He is doing well with no new symptoms or cardiovascular complaints and no change in exercise capacity.  He denies chest discomfort, MONTIEL, palpitations, PND/orthopnea, lightheadedness and syncope.     He continues to take prednisone 5mg daily and is now seeing Dr. Pemberton as Dr. Estrada retired.     He decided against doing bariatric surgery.  His BMI today is 34.  He's down 5# since February.     Feb 2018 HPI: Routine yearly f/u.  Doing well with no complaints.  He did not end up going through with gastric bypass.  Weight is stable.  BMI registered today as slightly under 35 but  "that's because 5'10" was used as his height.  He's really more like 5'9".     He denies chest discomfort, MONTIEL, palpitations, PND/orthopnea, lightheadedness and syncope.     Still playing golf 4-5 times per week at [email or web address].  No issues or new limitations.     His BP was a little up today but he was worked up because of the parking situation today.  He brings with him, though, a list of BPs over the last two weeks and almost all of the readings are under 130 systolic, with several in the 100s-110s.        Feb 2017 HPI: Here for preoperative evaluation prior to gastric sleeve operation.  He continues with phase III rehab and continues to do very well.  He denies chest discomfort, MONTIEL, palpitations, PND/orthopnea, lightheadedness and syncope.        June 2016 HPI: Mr. Allen is a very pleasant man who has been seen by Dr. Pinedo at [facility] for an MI on [facility] three years ago. He had an occluded proximal LAD that was treated successfully and he retained normal systolic function.     Prior to his initial visit with me 15 months ago, he had had a rash and was inadvertently told by a nurse to hold his ASA and Lipitor. He's back on that now and doing fine. He has had a problem with pruritic rashes in the past but that is not a problem currently and hasn't had a flare in about 3 months. These patches are now mainly on his anterior forearms.     He's going to phase III rehab now and doing well. He denies chest discomfort, MONTIEL, palpitations, PND/orthopnea, lightheadedness and syncope.     He takes prednisone 10mg every other day as directed by a Pulmonologist.    Patient Active Problem List   Diagnosis    Centrilobular emphysema    GERD (gastroesophageal reflux disease)    HTN (hypertension), benign    Sciatica    SHOLA (obstructive sleep apnea)    Restless leg syndrome    Benign neoplasm of colon    CAD (coronary artery disease)    Rash    Severe obesity    History of smoking 30 or more pack years    Chronic " obstructive pulmonary disease    NAFLD (nonalcoholic fatty liver disease)    Adenomatous polyp of colon    Scrotal abscess    Personal history of colonic polyps    Dyslipidemia       Current Outpatient Medications   Medication Sig    albuterol (PROVENTIL/VENTOLIN HFA) 90 mcg/actuation inhaler Inhale 2 puffs into the lungs every 4 (four) hours as needed for Wheezing or Shortness of Breath.    albuterol-ipratropium (DUO-NEB) 2.5 mg-0.5 mg/3 mL nebulizer solution INHALE 1 VIAL VIA NEBULIZER EVERY 6 HOURS AS NEEDEDILF    amitriptyline (ELAVIL) 25 MG tablet TAKE 1 TABLET BY MOUTH EVERY DAY    amLODIPine (NORVASC) 5 MG tablet Take 1 tablet (5 mg total) by mouth once daily.    aspirin (ECOTRIN) 81 MG EC tablet Take 81 mg by mouth once daily.     atorvastatin (LIPITOR) 80 MG tablet TAKE 1 TABLET (80 MG TOTAL) BY MOUTH ONCE DAILY.    BETA-CAROTENE,A, W-C & E/MIN (OCUVITE ORAL) Take 1 capsule by mouth once daily.     echinacea 400 mg Cap Take 1 capsule by mouth once daily.     fluticasone propionate (FLONASE) 50 mcg/actuation nasal spray SPRAY 1 SPRAY INTO EACH NOSTRIL EVERY DAY    hydroCHLOROthiazide (HYDRODIURIL) 25 MG tablet Take 0.5 tablets (12.5 mg total) by mouth once daily.    latanoprost 0.005 % ophthalmic solution Place 1 drop into both eyes once daily.     losartan (COZAAR) 100 MG tablet Take 1 tablet (100 mg total) by mouth once daily.    nitroGLYCERIN (NITROSTAT) 0.4 MG SL tablet Place 1 tablet (0.4 mg total) under the tongue every 5 (five) minutes as needed.    omeprazole (PRILOSEC) 20 MG capsule Take 1 capsule (20 mg total) by mouth once daily.    predniSONE (DELTASONE) 5 MG tablet TAKE 1 TABLET BY MOUTH EVERY OTHER DAY. TAKE WITH FOOD.    roflumilast (DALIRESP) 500 mcg Tab Take 1 tablet (500 mcg total) by mouth once daily.    SPIRIVA WITH HANDIHALER 18 mcg inhalation capsule INHALE 1 CAPSULE WITH INHALATION DEVICE ONCE DAILY    SYMBICORT 160-4.5 mcg/actuation HFAA INHALE 2 PUFFS INTO THE  LUNGS EVERY 12 HOURS. RINSE MOUTH AFTER USE     No current facility-administered medications for this visit.       Review of Systems   Constitutional: Negative.   HENT: Negative.    Eyes: Negative.    Cardiovascular: Negative.  Negative for chest pain, dyspnea on exertion, near-syncope, orthopnea and palpitations.   Respiratory: Negative.  Negative for cough and shortness of breath.    Endocrine: Negative.    Hematologic/Lymphatic: Negative.    Skin: Negative.    Musculoskeletal: Negative.    Gastrointestinal: Negative.    Genitourinary: Negative.    Neurological: Negative.    Psychiatric/Behavioral: Negative.      Objective:   Physical Exam  Vitals reviewed.   Constitutional:       Appearance: He is well-developed and well-nourished.   HENT:      Head: Normocephalic and atraumatic.      Mouth/Throat:      Mouth: Oropharynx is clear and moist.   Eyes:      General: No scleral icterus.     Extraocular Movements: EOM normal.      Conjunctiva/sclera: Conjunctivae normal.   Neck:      Vascular: No JVD.   Cardiovascular:      Rate and Rhythm: Normal rate and regular rhythm.      Pulses: Intact distal pulses.      Heart sounds: Normal heart sounds. No murmur heard.  No friction rub. No gallop.    Pulmonary:      Effort: Pulmonary effort is normal.      Breath sounds: Normal breath sounds. No wheezing or rales.   Abdominal:      General: Bowel sounds are normal. There is no distension.      Palpations: Abdomen is soft.      Tenderness: There is no abdominal tenderness.   Musculoskeletal:         General: No edema. Normal range of motion.      Cervical back: Normal range of motion and neck supple.   Skin:     General: Skin is warm and dry.      Findings: No erythema or rash.   Neurological:      Mental Status: He is alert and oriented to person, place, and time.   Psychiatric:         Mood and Affect: Mood and affect normal.         Behavior: Behavior normal.         Thought Content: Thought content normal.          Judgment: Judgment normal.         Lab Results   Component Value Date    WBC 10.52 12/02/2021    HGB 13.8 (L) 12/02/2021    HCT 44.7 12/02/2021    MCV 85 12/02/2021     12/02/2021         Chemistry        Component Value Date/Time     12/02/2021 0711    K 4.5 12/02/2021 0711     12/02/2021 0711    CO2 28 12/02/2021 0711    BUN 24 (H) 12/02/2021 0711    CREATININE 1.1 12/02/2021 0711     (H) 12/02/2021 0711        Component Value Date/Time    CALCIUM 9.7 12/02/2021 0711    ALKPHOS 115 12/02/2021 0711    AST 20 12/02/2021 0711    ALT 29 12/02/2021 0711    BILITOT 1.0 12/02/2021 0711    ESTGFRAFRICA >60.0 12/02/2021 0711    EGFRNONAA >60.0 12/02/2021 0711            Lab Results   Component Value Date    CHOL 102 (L) 12/02/2021    CHOL 106 (L) 11/09/2020    CHOL 100 (L) 10/31/2019     Lab Results   Component Value Date    HDL 42 12/02/2021    HDL 44 11/09/2020    HDL 41 10/31/2019     Lab Results   Component Value Date    LDLCALC 49.2 (L) 12/02/2021    LDLCALC 46.8 (L) 11/09/2020    LDLCALC 45.2 (L) 10/31/2019     Lab Results   Component Value Date    TRIG 54 12/02/2021    TRIG 76 11/09/2020    TRIG 69 10/31/2019     Lab Results   Component Value Date    CHOLHDL 41.2 12/02/2021    CHOLHDL 41.5 11/09/2020    CHOLHDL 41.0 10/31/2019       Lab Results   Component Value Date    TSH 0.861 12/02/2021       Lab Results   Component Value Date    HGBA1C 5.8 05/30/2013       Assessment:     1. Coronary artery disease involving native coronary artery of native heart without angina pectoris    2. HTN (hypertension), benign    3. SHOLA (obstructive sleep apnea)    4. Dyslipidemia        Plan:     Continue current medicines.    Diet/exercise goals reinforced.    Hand washing and social distancing stressed.    F/U 12 months

## 2022-02-11 ENCOUNTER — TELEPHONE (OUTPATIENT)
Dept: SLEEP MEDICINE | Facility: CLINIC | Age: 77
End: 2022-02-11
Payer: MEDICARE

## 2022-02-12 ENCOUNTER — PATIENT MESSAGE (OUTPATIENT)
Dept: SLEEP MEDICINE | Facility: CLINIC | Age: 77
End: 2022-02-12
Payer: MEDICARE

## 2022-02-14 ENCOUNTER — OFFICE VISIT (OUTPATIENT)
Dept: SLEEP MEDICINE | Facility: CLINIC | Age: 77
End: 2022-02-14
Payer: MEDICARE

## 2022-02-14 VITALS
SYSTOLIC BLOOD PRESSURE: 148 MMHG | BODY MASS INDEX: 32.96 KG/M2 | WEIGHT: 229.75 LBS | HEART RATE: 112 BPM | DIASTOLIC BLOOD PRESSURE: 63 MMHG

## 2022-02-14 DIAGNOSIS — J96.11 CHRONIC RESPIRATORY FAILURE WITH HYPOXIA: ICD-10-CM

## 2022-02-14 DIAGNOSIS — G47.10 HYPERSOMNOLENCE: Primary | ICD-10-CM

## 2022-02-14 DIAGNOSIS — G47.33 OSA (OBSTRUCTIVE SLEEP APNEA): ICD-10-CM

## 2022-02-14 PROCEDURE — 99999 PR PBB SHADOW E&M-EST. PATIENT-LVL III: ICD-10-PCS | Mod: PBBFAC,,, | Performed by: INTERNAL MEDICINE

## 2022-02-14 PROCEDURE — 99999 PR PBB SHADOW E&M-EST. PATIENT-LVL III: CPT | Mod: PBBFAC,,, | Performed by: INTERNAL MEDICINE

## 2022-02-14 PROCEDURE — 99213 OFFICE O/P EST LOW 20 MIN: CPT | Mod: PBBFAC | Performed by: INTERNAL MEDICINE

## 2022-02-14 PROCEDURE — 99204 OFFICE O/P NEW MOD 45 MIN: CPT | Mod: S$PBB,,, | Performed by: INTERNAL MEDICINE

## 2022-02-14 PROCEDURE — 99204 PR OFFICE/OUTPT VISIT, NEW, LEVL IV, 45-59 MIN: ICD-10-PCS | Mod: S$PBB,,, | Performed by: INTERNAL MEDICINE

## 2022-02-14 NOTE — PROGRESS NOTES
Referred by Self, Aaareferral     NEW PATIENT VISIT    Jordin Allen Jr.  is a pleasant 76 y.o. male  with PMH significant for RLS, COPD, epmysempa, HTN, CAD, GERD, SHOLA dx 2012 (CPAP: adherent)  who presents today for CPAP management.    His machine stopped working about a week ago  Was using nightly    PAP history   Problems none   Mask Full face mask   Pressure No prob   Benefit Sleeps better   DME [employer]   Machine age 2012   Download n/a       SLEEP SCHEDULE   Bed Time 10-11p   Sleep Latency 20 to 60 minutes   Arousals 2-3   Nocturia rare   Back to sleep    Wake time 5:30A -8A   Naps none   Work      Vitals:    02/14/22 1109   BP: (!) 148/63   BP Location: Right arm   Patient Position: Sitting   BP Method: Medium (Automatic)   Pulse: (!) 112   Weight: 104.2 kg (229 lb 11.5 oz)     Physical Exam:    GEN:   Well-appearing  Psych:  Appropriate affect, demonstrates insight  SKIN:  No rash on the face or bridge of the nose    LABS:   Lab Results   Component Value Date    HGB 13.8 (L) 12/02/2021    CO2 28 12/02/2021       RECORDS REVIEWED PREVIOUSLY:    CPAP 7/18/12: 5-14, 12cwp rx  PSG 5/22/2012: AHI 21.6    ASSESSMENT    PROBLEM DESCRIPTION/ Sx on Presentation  STATUS   SHOLA   Moderately severe  rx was CPAP =12 cwp  HEENT: MP4, + oropharynx shallow in A-P dimension and + mild micrognathia  New   Daytime Sx   denies sleepiness when inactive, occasional nap after lunch  denies sleepiness when driving   ESS 16/24 on intake  New   Hx of hypoxemia   Was on supplemental 02 at night with CPAP in the past    New   Other issues:     PLAN     -will order new machine (auto 10-14 cwp)  -check ON oximetry through [facility] on CPAP 10-14  -pt using and benefitting from CPAP  -- we discussed [employer] recall of CPAPs/BIPAPs due to potentially dangerous particles or gas that could come from a foam insert and their recommendation that the patient stop using the machine until replaced.    more information at [employer]  website : [email or web address]/healthcare/[email or web address]/[email or web address]/src-update  --discussed registering the machine for recall (he has registered his machine)    -driving precautions were discussed with the patient    RTC          The patient was given open opportunity to ask questions and/or express concerns about treatment plan.   All questions/concerns were discussed.     Two patient identifiers used prior to evaluation.

## 2022-02-14 NOTE — ADDENDUM NOTE
Addended by: ADAIR BURNS on: 2/14/2022 11:36 AM     Modules accepted: Orders, Level of Service     [employer] Pregnancy And Lactation Text: This medication is Pregnancy Category B and is considered safe. There is some data to suggest avoiding during the third trimester. It is unknown if this medication is excreted in breast milk.

## 2022-02-22 ENCOUNTER — PATIENT MESSAGE (OUTPATIENT)
Dept: SLEEP MEDICINE | Facility: CLINIC | Age: 77
End: 2022-02-22
Payer: MEDICARE

## 2022-02-28 ENCOUNTER — EXTERNAL CHRONIC CARE MANAGEMENT (OUTPATIENT)
Dept: PRIMARY CARE CLINIC | Facility: CLINIC | Age: 77
End: 2022-02-28
Payer: MEDICARE

## 2022-02-28 PROCEDURE — 99490 PR CHRONIC CARE MGMT, 1ST 20 MIN: ICD-10-PCS | Mod: S$PBB,,, | Performed by: INTERNAL MEDICINE

## 2022-02-28 PROCEDURE — 99490 CHRNC CARE MGMT STAFF 1ST 20: CPT | Mod: PBBFAC,PO | Performed by: INTERNAL MEDICINE

## 2022-02-28 PROCEDURE — 99490 CHRNC CARE MGMT STAFF 1ST 20: CPT | Mod: S$PBB,,, | Performed by: INTERNAL MEDICINE

## 2022-03-10 ENCOUNTER — PATIENT MESSAGE (OUTPATIENT)
Dept: SLEEP MEDICINE | Facility: CLINIC | Age: 77
End: 2022-03-10
Payer: MEDICARE

## 2022-03-21 ENCOUNTER — TELEPHONE (OUTPATIENT)
Dept: INTERNAL MEDICINE | Facility: CLINIC | Age: 77
End: 2022-03-21
Payer: MEDICARE

## 2022-03-21 DIAGNOSIS — D12.6 ADENOMATOUS POLYP OF COLON, UNSPECIFIED PART OF COLON: Primary | ICD-10-CM

## 2022-03-21 DIAGNOSIS — D49.0 NEOPLASM OF UNSPECIFIED BEHAVIOR OF DIGESTIVE SYSTEM: ICD-10-CM

## 2022-03-21 DIAGNOSIS — Z12.11 COLON CANCER SCREENING: ICD-10-CM

## 2022-03-21 NOTE — TELEPHONE ENCOUNTER
Good afternoon,     Patient states he is due for his Colonoscopy. Pt last Colonoscopy was 04/12/2019, with recommendations for every 3 years. Pt would like a referral to get scheduled for the Colonoscopy.       Thanks,     Lacey Turcios LPN   Care Coordinator   [facility]   [phone] ext [phone]

## 2022-03-31 ENCOUNTER — EXTERNAL CHRONIC CARE MANAGEMENT (OUTPATIENT)
Dept: PRIMARY CARE CLINIC | Facility: CLINIC | Age: 77
End: 2022-03-31
Payer: MEDICARE

## 2022-03-31 PROCEDURE — 99490 PR CHRONIC CARE MGMT, 1ST 20 MIN: ICD-10-PCS | Mod: S$PBB,,, | Performed by: INTERNAL MEDICINE

## 2022-03-31 PROCEDURE — 99439 PR CHRONIC CARE MGMT, EA ADDTL 20 MIN: ICD-10-PCS | Mod: S$PBB,,, | Performed by: INTERNAL MEDICINE

## 2022-03-31 PROCEDURE — 99439 CHRNC CARE MGMT STAF EA ADDL: CPT | Mod: PBBFAC,PO | Performed by: INTERNAL MEDICINE

## 2022-03-31 PROCEDURE — 99490 CHRNC CARE MGMT STAFF 1ST 20: CPT | Mod: S$PBB,,, | Performed by: INTERNAL MEDICINE

## 2022-03-31 PROCEDURE — 99490 CHRNC CARE MGMT STAFF 1ST 20: CPT | Mod: PBBFAC,PO,25 | Performed by: INTERNAL MEDICINE

## 2022-03-31 PROCEDURE — 99439 CHRNC CARE MGMT STAF EA ADDL: CPT | Mod: S$PBB,,, | Performed by: INTERNAL MEDICINE

## 2022-04-07 ENCOUNTER — PATIENT MESSAGE (OUTPATIENT)
Dept: ENDOSCOPY | Facility: HOSPITAL | Age: 77
End: 2022-04-07
Payer: MEDICARE

## 2022-04-07 DIAGNOSIS — Z12.11 SPECIAL SCREENING FOR MALIGNANT NEOPLASMS, COLON: Primary | ICD-10-CM

## 2022-04-07 RX ORDER — POLYETHYLENE GLYCOL 3350, SODIUM SULFATE ANHYDROUS, SODIUM BICARBONATE, SODIUM CHLORIDE, POTASSIUM CHLORIDE 236; 22.74; 6.74; 5.86; 2.97 G/4L; G/4L; G/4L; G/4L; G/4L
4 POWDER, FOR SOLUTION ORAL ONCE
Qty: 4000 ML | Refills: 0 | Status: SHIPPED | OUTPATIENT
Start: 2022-04-07 | End: 2022-04-07

## 2022-04-18 ENCOUNTER — OFFICE VISIT (OUTPATIENT)
Dept: SLEEP MEDICINE | Facility: CLINIC | Age: 77
End: 2022-04-18
Payer: MEDICARE

## 2022-04-18 VITALS — BODY MASS INDEX: 32.82 KG/M2 | HEIGHT: 70 IN | WEIGHT: 229.25 LBS

## 2022-04-18 DIAGNOSIS — G47.10 HYPERSOMNOLENCE: ICD-10-CM

## 2022-04-18 DIAGNOSIS — G47.33 OSA (OBSTRUCTIVE SLEEP APNEA): ICD-10-CM

## 2022-04-18 DIAGNOSIS — J30.9 ALLERGIC SINUSITIS: Primary | ICD-10-CM

## 2022-04-18 PROCEDURE — 99999 PR PBB SHADOW E&M-EST. PATIENT-LVL III: ICD-10-PCS | Mod: PBBFAC,,, | Performed by: INTERNAL MEDICINE

## 2022-04-18 PROCEDURE — 99214 PR OFFICE/OUTPT VISIT, EST, LEVL IV, 30-39 MIN: ICD-10-PCS | Mod: S$PBB,,, | Performed by: INTERNAL MEDICINE

## 2022-04-18 PROCEDURE — 99999 PR PBB SHADOW E&M-EST. PATIENT-LVL III: CPT | Mod: PBBFAC,,, | Performed by: INTERNAL MEDICINE

## 2022-04-18 PROCEDURE — 99214 OFFICE O/P EST MOD 30 MIN: CPT | Mod: S$PBB,,, | Performed by: INTERNAL MEDICINE

## 2022-04-18 PROCEDURE — 99213 OFFICE O/P EST LOW 20 MIN: CPT | Mod: PBBFAC | Performed by: INTERNAL MEDICINE

## 2022-04-18 RX ORDER — AZELASTINE 1 MG/ML
1 SPRAY, METERED NASAL 2 TIMES DAILY
Qty: 30 ML | Refills: 3 | Status: ON HOLD | OUTPATIENT
Start: 2022-04-18 | End: 2022-12-06 | Stop reason: HOSPADM

## 2022-04-18 NOTE — PROGRESS NOTES
"    ESTABLISHED PATIENT VISIT    Jordni Allen Jr.  is a pleasant 76 y.o. male  with PMH significant for RLS, COPD, epmysempa, HTN, CAD, GERD, SHOLA dx 2012 (CPAP: adherent)  who presents today for CPAP management.    Here today for CPAP follow-up    PLAN last visit:   -will order new machine (auto 10-14 cwp)  -check ON oximetry through [facility] on CPAP 10-14  -pt using and benefitting from CPAP  -- we discussed [employer] recall of CPAPs/BIPAPs due to potentially dangerous particles or gas that could come from a foam insert and their recommendation that the patient stop using the machine until replaced.    more information at [employer] website : [email or web address]/sleep/communications/src-update  --discussed registering the machine for recall (he has registered his machine)      Since last visit:   ON oximetry 2/16/2022: 4.2 min <=88%, JOSE DANIEL 52 (3%) (was not done on CPAP)  Since last visit received new machine from [employer]  Using nightly for the whole night, sleeping better      PAP history   Problems None   Mask Full face mask   Pressure No prob   Benefit Sleeps better   DME [employer]   Machine age 2022 early   Download N/a, requested from [employer]       SLEEP SCHEDULE   Bed Time 10p   Sleep Latency Not long   Arousals Not as much   Nocturia rare   Back to sleep    Wake time 5:45A (if playing golf), 7-8AM   Naps none   Work      Vitals:    04/18/22 1005   Weight: 104 kg (229 lb 4.5 oz)   Height: 5' 10" (1.778 m)     Physical Exam:    GEN:   Well-appearing  Psych:  Appropriate affect, demonstrates insight  SKIN:  No rash on the face or bridge of the nose    LABS:   Lab Results   Component Value Date    HGB 13.8 (L) 12/02/2021    CO2 28 12/02/2021       RECORDS REVIEWED PREVIOUSLY:    CPAP 7/18/12: 5-14, 12cwp rx  PSG 5/22/2012: AHI 21.6    ASSESSMENT    PROBLEM DESCRIPTION/ Sx on Presentation Interval HX STATUS   SHOLA   Moderately severe  rx was CPAP =12 cwp  HEENT: MP4, + oropharynx shallow " in A-P dimension and + mild micrognathia Good usage and sx respons reported Likely controlled   Daytime Sx   denies sleepiness when inactive, occasional nap after lunch  denies sleepiness when driving   ESS 16/24 on intake Occasional sleepiness  No change  Not troublesome Persists   Hx of hypoxemia   Was on supplemental 02 at night with CPAP in the past   Had ON oximetry off of CPAP with hypoxemia persists   Nasal congestion   frequent  Antihistamine: not taking  Nasal sprays: flonase nightly  Surgeries:      Continued congestions uncontrolled   Other issues:     PLAN     -overnight oximetry on CPAP  -trial of anithistamine oral daily  -send rx for astelin, continue flonase    -the patient is using and benefiting from PAP therapy   prn  RTC          The patient was given open opportunity to ask questions and/or express concerns about treatment plan.   All questions/concerns were discussed.     Two patient identifiers used prior to evaluation.

## 2022-04-30 ENCOUNTER — EXTERNAL CHRONIC CARE MANAGEMENT (OUTPATIENT)
Dept: PRIMARY CARE CLINIC | Facility: CLINIC | Age: 77
End: 2022-04-30
Payer: MEDICARE

## 2022-04-30 PROCEDURE — 99490 CHRNC CARE MGMT STAFF 1ST 20: CPT | Mod: PBBFAC,PO | Performed by: INTERNAL MEDICINE

## 2022-04-30 PROCEDURE — 99490 CHRNC CARE MGMT STAFF 1ST 20: CPT | Mod: S$PBB,,, | Performed by: INTERNAL MEDICINE

## 2022-04-30 PROCEDURE — 99490 PR CHRONIC CARE MGMT, 1ST 20 MIN: ICD-10-PCS | Mod: S$PBB,,, | Performed by: INTERNAL MEDICINE

## 2022-05-09 ENCOUNTER — CLINICAL SUPPORT (OUTPATIENT)
Dept: AUDIOLOGY | Facility: CLINIC | Age: 77
End: 2022-05-09
Payer: MEDICARE

## 2022-05-09 DIAGNOSIS — H90.3 SENSORINEURAL HEARING LOSS, BILATERAL: Primary | ICD-10-CM

## 2022-05-09 PROCEDURE — 99499 NO LOS: ICD-10-PCS | Mod: S$PBB,,, | Performed by: AUDIOLOGIST

## 2022-05-09 PROCEDURE — 99499 UNLISTED E&M SERVICE: CPT | Mod: S$PBB,,, | Performed by: AUDIOLOGIST

## 2022-05-09 NOTE — PROGRESS NOTES
Jordin Allen , a 76 y.o. male, was seen today for a hearing aid check.  Pt complained that both his hearing aid(s) were not working. Listening check confirmed aids were dead.   Aid(s) were cleaned and checked.  Wax guard(s) and dome(s) were replaced.  Listening check revealed aid(s) the left aid was now working properly, but the right aid was still dead.  A new  was placed on his right aid and listening check confirmed that the aid was now working properly.  Pt confirmed good subjective benefit.  He was reminded that he can walk up to the hearing aid window any time during normal business hours to have his aids assessed.  Pt will follow up annually unless issues arise.      Hearing Aid Information:  Niurka Garcia [id number]  [employer]  Invoice Date: 6/26/17   Lt SN [id number]  Rt SN [id number]  : 2xS  Dome: [employer]  Warranty Exp 9/23/20     Recommendations:  1) Return in January 2023 for annual hearing testing and hearing aid check  2) Contact office if issues arise with hearing aids

## 2022-05-16 ENCOUNTER — PATIENT MESSAGE (OUTPATIENT)
Dept: PULMONOLOGY | Facility: CLINIC | Age: 77
End: 2022-05-16
Payer: MEDICARE

## 2022-05-24 ENCOUNTER — TELEPHONE (OUTPATIENT)
Dept: PULMONOLOGY | Facility: CLINIC | Age: 77
End: 2022-05-24
Payer: MEDICARE

## 2022-05-24 NOTE — TELEPHONE ENCOUNTER
I called [facility] at Mr Allen's request. He needed his Symbicort 160/4.5mcg refilled by mail order, 90 day supply with 3 refills. I will let Mr Allen know it was done. Rebeca Hannon LPN

## 2022-05-31 ENCOUNTER — ANESTHESIA EVENT (OUTPATIENT)
Dept: ENDOSCOPY | Facility: HOSPITAL | Age: 77
End: 2022-05-31
Payer: MEDICARE

## 2022-05-31 ENCOUNTER — OFFICE VISIT (OUTPATIENT)
Dept: OTOLARYNGOLOGY | Facility: CLINIC | Age: 77
End: 2022-05-31
Payer: MEDICARE

## 2022-05-31 ENCOUNTER — EXTERNAL CHRONIC CARE MANAGEMENT (OUTPATIENT)
Dept: PRIMARY CARE CLINIC | Facility: CLINIC | Age: 77
End: 2022-05-31
Payer: MEDICARE

## 2022-05-31 ENCOUNTER — ANESTHESIA (OUTPATIENT)
Dept: ENDOSCOPY | Facility: HOSPITAL | Age: 77
End: 2022-05-31
Payer: MEDICARE

## 2022-05-31 ENCOUNTER — HOSPITAL ENCOUNTER (OUTPATIENT)
Facility: HOSPITAL | Age: 77
Discharge: HOME | End: 2022-05-31
Attending: COLON & RECTAL SURGERY | Admitting: COLON & RECTAL SURGERY
Payer: MEDICARE

## 2022-05-31 VITALS
TEMPERATURE: 98 F | BODY MASS INDEX: 33.64 KG/M2 | HEART RATE: 80 BPM | HEIGHT: 70 IN | RESPIRATION RATE: 18 BRPM | DIASTOLIC BLOOD PRESSURE: 94 MMHG | SYSTOLIC BLOOD PRESSURE: 140 MMHG | WEIGHT: 235 LBS | OXYGEN SATURATION: 98 %

## 2022-05-31 DIAGNOSIS — Z86.010 PERSONAL HISTORY OF COLONIC POLYPS: ICD-10-CM

## 2022-05-31 DIAGNOSIS — H62.42 EXTERNAL OTITIS OF LEFT EAR DUE TO FUNGUS: Primary | ICD-10-CM

## 2022-05-31 DIAGNOSIS — B36.9 EXTERNAL OTITIS OF LEFT EAR DUE TO FUNGUS: Primary | ICD-10-CM

## 2022-05-31 PROCEDURE — 45385 PR COLONOSCOPY,REMV LESN,SNARE: ICD-10-PCS | Mod: PT,,, | Performed by: COLON & RECTAL SURGERY

## 2022-05-31 PROCEDURE — 99999 PR PBB SHADOW E&M-EST. PATIENT-LVL III: ICD-10-PCS | Mod: PBBFAC,,, | Performed by: NURSE PRACTITIONER

## 2022-05-31 PROCEDURE — 25000003 PHARM REV CODE 250: Performed by: NURSE ANESTHETIST, CERTIFIED REGISTERED

## 2022-05-31 PROCEDURE — 99490 CHRNC CARE MGMT STAFF 1ST 20: CPT | Mod: PBBFAC,PO | Performed by: INTERNAL MEDICINE

## 2022-05-31 PROCEDURE — 45385 COLONOSCOPY W/LESION REMOVAL: CPT | Mod: PT,,, | Performed by: COLON & RECTAL SURGERY

## 2022-05-31 PROCEDURE — E9220 PRA ENDO ANESTHESIA: ICD-10-PCS | Mod: PT,,, | Performed by: NURSE ANESTHETIST, CERTIFIED REGISTERED

## 2022-05-31 PROCEDURE — 88305 TISSUE EXAM BY PATHOLOGIST: CPT | Mod: 26,,, | Performed by: PATHOLOGY

## 2022-05-31 PROCEDURE — E9220 PRA ENDO ANESTHESIA: HCPCS | Mod: PT,,, | Performed by: NURSE ANESTHETIST, CERTIFIED REGISTERED

## 2022-05-31 PROCEDURE — 45385 COLONOSCOPY W/LESION REMOVAL: CPT | Mod: PT | Performed by: COLON & RECTAL SURGERY

## 2022-05-31 PROCEDURE — 99999 PR PBB SHADOW E&M-EST. PATIENT-LVL III: CPT | Mod: PBBFAC,,, | Performed by: NURSE PRACTITIONER

## 2022-05-31 PROCEDURE — 99213 OFFICE O/P EST LOW 20 MIN: CPT | Mod: PBBFAC | Performed by: NURSE PRACTITIONER

## 2022-05-31 PROCEDURE — 88305 TISSUE EXAM BY PATHOLOGIST: ICD-10-PCS | Mod: 26,,, | Performed by: PATHOLOGY

## 2022-05-31 PROCEDURE — 88305 TISSUE EXAM BY PATHOLOGIST: CPT | Performed by: PATHOLOGY

## 2022-05-31 PROCEDURE — 99490 CHRNC CARE MGMT STAFF 1ST 20: CPT | Mod: S$PBB,,, | Performed by: INTERNAL MEDICINE

## 2022-05-31 PROCEDURE — 37000009 HC ANESTHESIA EA ADD 15 MINS: Performed by: COLON & RECTAL SURGERY

## 2022-05-31 PROCEDURE — 63600175 PHARM REV CODE 636 W HCPCS: Performed by: NURSE ANESTHETIST, CERTIFIED REGISTERED

## 2022-05-31 PROCEDURE — 37000008 HC ANESTHESIA 1ST 15 MINUTES: Performed by: COLON & RECTAL SURGERY

## 2022-05-31 PROCEDURE — 25000003 PHARM REV CODE 250: Performed by: COLON & RECTAL SURGERY

## 2022-05-31 PROCEDURE — 99490 PR CHRONIC CARE MGMT, 1ST 20 MIN: ICD-10-PCS | Mod: S$PBB,,, | Performed by: INTERNAL MEDICINE

## 2022-05-31 PROCEDURE — 99212 OFFICE O/P EST SF 10 MIN: CPT | Mod: S$PBB,,, | Performed by: NURSE PRACTITIONER

## 2022-05-31 PROCEDURE — 99212 PR OFFICE/OUTPT VISIT, EST, LEVL II, 10-19 MIN: ICD-10-PCS | Mod: S$PBB,,, | Performed by: NURSE PRACTITIONER

## 2022-05-31 RX ORDER — PROPOFOL 10 MG/ML
VIAL (ML) INTRAVENOUS
Status: DISCONTINUED | OUTPATIENT
Start: 2022-05-31 | End: 2022-05-31

## 2022-05-31 RX ORDER — PROPOFOL 10 MG/ML
VIAL (ML) INTRAVENOUS CONTINUOUS PRN
Status: DISCONTINUED | OUTPATIENT
Start: 2022-05-31 | End: 2022-05-31

## 2022-05-31 RX ORDER — SODIUM CHLORIDE 9 MG/ML
INJECTION, SOLUTION INTRAVENOUS CONTINUOUS
Status: DISCONTINUED | OUTPATIENT
Start: 2022-05-31 | End: 2022-05-31 | Stop reason: HOSPADM

## 2022-05-31 RX ORDER — LIDOCAINE HYDROCHLORIDE 20 MG/ML
INJECTION INTRAVENOUS
Status: DISCONTINUED | OUTPATIENT
Start: 2022-05-31 | End: 2022-05-31

## 2022-05-31 RX ADMIN — PROPOFOL 70 MG: 10 INJECTION, EMULSION INTRAVENOUS at 09:05

## 2022-05-31 RX ADMIN — Medication 150 MCG/KG/MIN: at 09:05

## 2022-05-31 RX ADMIN — SODIUM CHLORIDE: 0.9 INJECTION, SOLUTION INTRAVENOUS at 09:05

## 2022-05-31 RX ADMIN — LIDOCAINE HYDROCHLORIDE 100 MG: 20 INJECTION, SOLUTION INTRAVENOUS at 09:05

## 2022-05-31 NOTE — ANESTHESIA POSTPROCEDURE EVALUATION
Anesthesia Post Evaluation    Patient: Jordin Allen JrGreg    Procedure(s) Performed: Procedure(s) (LRB):  COLONOSCOPY (N/A)    Final Anesthesia Type: general      Patient location during evaluation: PACU  Patient participation: Yes- Able to Participate  Level of consciousness: awake and alert and oriented  Post-procedure vital signs: reviewed and stable  Pain management: adequate  Airway patency: patent    PONV status at discharge: No PONV  Anesthetic complications: no      Cardiovascular status: hemodynamically stable  Respiratory status: unassisted  Hydration status: euvolemic  Follow-up not needed.          Vitals Value Taken Time   /94 05/31/22 1103   Temp 36.4 °C (97.5 °F) 05/31/22 1033   Pulse 80 05/31/22 1103   Resp 18 05/31/22 1103   SpO2 98 % 05/31/22 1103         Event Time   Out of Recovery 11:10:00         Pain/Shelley Score: Shelley Score: 9 (5/31/2022 10:33 AM)

## 2022-05-31 NOTE — TRANSFER OF CARE
"Anesthesia Transfer of Care Note    Patient: Jordin Allen Jr.    Procedure(s) Performed: Procedure(s) (LRB):  COLONOSCOPY (N/A)    Patient location: PACU    Anesthesia Type: general    Transport from OR: Transported from OR on room air with adequate spontaneous ventilation    Post pain: adequate analgesia    Post assessment: no apparent anesthetic complications and tolerated procedure well    Post vital signs: stable    Level of consciousness: awake, alert and oriented    Nausea/Vomiting: no nausea/vomiting    Complications: none    Transfer of care protocol was followed      Last vitals:   Visit Vitals  /74 (BP Location: Left arm, Patient Position: Lying)   Pulse 78   Temp 36.4 °C (97.5 °F) (Temporal)   Resp 20   Ht 5' 10" (1.778 m)   Wt 106.6 kg (235 lb)   SpO2 97%   BMI 33.72 kg/m²     "

## 2022-05-31 NOTE — PROGRESS NOTES
Subjective:      Jordin Allen Jr. is a 76 y.o. male who was self-referred for hearing loss.    Mr. Allen reports having feedback from his left ear aid. He feels when he moves his hearing aids start making noise. He denies ear pain or drainage. He does have a history of PE tubes bilaterally.       Past Medical History  He has a past medical history of Benign neoplasm of colon, Bronchitis chronic, CAD (coronary artery disease), COPD (chronic obstructive pulmonary disease), Emphysema of lung, GERD (gastroesophageal reflux disease), colonic polyps, Hypertension, NAFLD (nonalcoholic fatty liver disease), SHOLA on CPAP, RLS (restless legs syndrome), and Screen for colon cancer.    Past Surgical History  He has a past surgical history that includes Cataract extraction, bilateral; bilateral foot surgery (1993); Colon surgery (2013); Cardiac catheterization (2013); Colonoscopy (N/A, 3/21/2018); scrotal abscess removal; and Colonoscopy (N/A, 4/12/2019).    Family History  His family history includes Heart attack in his mother; Heart disease in his mother; Hypertension in his mother.    Social History  He reports that he quit smoking about 9 years ago. His smoking use included cigarettes. He has a 40.00 pack-year smoking history. He has never used smokeless tobacco. He reports current alcohol use. He reports that he does not use drugs.    Allergies  He is allergic to brilinta [ticagrelor], lisinopril, and metoprolol succinate.    Medications  He has a current medication list which includes the following prescription(s): albuterol, albuterol-ipratropium, amitriptyline, amlodipine, aspirin, atorvastatin, azelastine, beta-carotene(a)-vits c,e/mins, echinacea, fluticasone propionate, hydrochlorothiazide, latanoprost, losartan, nitroglycerin, omeprazole, prednisone, daliresp, spiriva with handihaler, and symbicort.    Review of Systems   Constitutional: Negative for chills, fever and unexpected weight change.   HENT: Positive  for hearing loss and tinnitus. Negative for ear discharge, ear pain, sore throat and trouble swallowing.    Neurological: Negative for dizziness and headaches.   Psychiatric/Behavioral: Negative for agitation and sleep disturbance. The patient is not nervous/anxious.           Objective:     There were no vitals taken for this visit.     Constitutional:   He appears well-developed and well-nourished.     Head:  Normocephalic and atraumatic.     Ears:    Ears:        Procedure    None      Data Reviewed    WBC (K/uL)   Date Value   12/02/2021 10.52     Platelets (K/uL)   Date Value   12/02/2021 195      Creatinine (mg/dL)   Date Value   12/02/2021 1.1     TSH (uIU/mL)   Date Value   12/02/2021 0.861     Glucose (mg/dL)   Date Value   12/02/2021 117 (H)     Hemoglobin A1C (%)   Date Value   05/30/2013 5.8              Assessment:     1. External otitis of left ear due to fungus         Plan:     Clotrimazole 1% solution - apply 4 drops to left ear two times daily for 2 weeks.    Follow up if symptoms worsen or fail to improve.

## 2022-05-31 NOTE — H&P
COLONOSCOPY HISTORY AND PE    Procedure : Colonoscopy      INDICATIONS: asymptomatic screening exam and personal history of colon polyps      Past Medical History:   Diagnosis Date    Benign neoplasm of colon 10/1/2013    Bronchitis chronic     CAD (coronary artery disease) 10/31/2013    COPD (chronic obstructive pulmonary disease)     Emphysema of lung     GERD (gastroesophageal reflux disease)     Hx of colonic polyps     Hypertension     NAFLD (nonalcoholic fatty liver disease) 3/27/2017    SHOLA on CPAP     RLS (restless legs syndrome)     Screen for colon cancer 8/30/2013       Past Surgical History:   Procedure Laterality Date    bilateral foot surgery  1993    CARDIAC CATHETERIZATION  2013    x1    CATARACT EXTRACTION, BILATERAL      COLON SURGERY  2013    Ace Mott MD    COLONOSCOPY N/A 3/21/2018    Procedure: COLONOSCOPY;  Surgeon: Terry Mott MD;  Location: [facility] ENDO (4TH FLR);  Service: Endoscopy;  Laterality: N/A;    COLONOSCOPY N/A 4/12/2019    Procedure: COLONOSCOPY;  Surgeon: John Mantilla MD;  Location: [facility] END[facility] (4TH FLR);  Service: Endoscopy;  Laterality: N/A;    scrotal abscess removal         Review of patient's allergies indicates:   Allergen Reactions    Brilinta [ticagrelor] Itching    Lisinopril      Other reaction(s): cough    Metoprolol succinate Rash       No current facility-administered medications on file prior to encounter.     Current Outpatient Medications on File Prior to Encounter   Medication Sig Dispense Refill    albuterol (PROVENTIL/VENTOLIN HFA) 90 mcg/actuation inhaler Inhale 2 puffs into the lungs every 4 (four) hours as needed for Wheezing or Shortness of Breath. 24 g 3    albuterol-ipratropium (DUO-NEB) 2.5 mg-0.5 mg/3 mL nebulizer solution INHALE 1 VIAL VIA NEBULIZER EVERY 6 HOURS AS NEEDEDILF 90 mL 11    amitriptyline (ELAVIL) 25 MG tablet TAKE 1 TABLET BY MOUTH EVERY DAY 90 tablet 2    amLODIPine (NORVASC) 5 MG tablet Take 1 tablet (5  mg total) by mouth once daily. 90 tablet 3    aspirin (ECOTRIN) 81 MG EC tablet Take 81 mg by mouth once daily.       atorvastatin (LIPITOR) 80 MG tablet TAKE 1 TABLET (80 MG TOTAL) BY MOUTH ONCE DAILY. 90 tablet 3    BETA-CAROTENE,A, W-C & E/MIN (OCUVITE ORAL) Take 1 capsule by mouth once daily.       echinacea 400 mg Cap Take 1 capsule by mouth once daily.       fluticasone propionate (FLONASE) 50 mcg/actuation nasal spray SPRAY 1 SPRAY INTO EACH NOSTRIL EVERY DAY 48 mL 1    hydroCHLOROthiazide (HYDRODIURIL) 25 MG tablet Take 0.5 tablets (12.5 mg total) by mouth once daily. 30 tablet 6    latanoprost 0.005 % ophthalmic solution Place 1 drop into both eyes once daily.       losartan (COZAAR) 100 MG tablet Take 1 tablet (100 mg total) by mouth once daily. 90 tablet 2    nitroGLYCERIN (NITROSTAT) 0.4 MG SL tablet Place 1 tablet (0.4 mg total) under the tongue every 5 (five) minutes as needed. 100 tablet 3    omeprazole (PRILOSEC) 20 MG capsule Take 1 capsule (20 mg total) by mouth once daily. 90 capsule 3    roflumilast (DALIRESP) 500 mcg Tab Take 1 tablet (500 mcg total) by mouth once daily. 90 tablet 3       Family History   Problem Relation Age of Onset    Heart disease Mother     Heart attack Mother     Hypertension Mother     Asthma Neg Hx     Emphysema Neg Hx     Prostate cancer Neg Hx     Cirrhosis Neg Hx        Social History     Socioeconomic History    Marital status:    Tobacco Use    Smoking status: Former Smoker     Packs/day: 1.00     Years: 40.00     Pack years: 40.00     Types: Cigarettes     Quit date: 8/15/2012     Years since quittin.7    Smokeless tobacco: Never Used   Substance and Sexual Activity    Alcohol use: Yes     Comment: social use only    Drug use: No    Sexual activity: Yes     Partners: Female   Social History Narrative    Retired .  .       Social Determinants of Health     Financial Resource Strain: Low Risk     Difficulty of  Paying Living Expenses: Not hard at all   Food Insecurity: No Food Insecurity    Worried About Running Out of Food in the Last Year: Never true    Ran Out of Food in the Last Year: Never true   Transportation Needs: No Transportation Needs    Lack of Transportation (Medical): No    Lack of Transportation (Non-Medical): No   Physical Activity: Inactive    Days of Exercise per Week: 4 days    Minutes of Exercise per Session: 0 min   Stress: Stress Concern Present    Feeling of Stress : To some extent   Social Connections: Unknown    Frequency of Communication with Friends and Family: More than three times a week    Frequency of Social Gatherings with Friends and Family: More than three times a week    Active Member of Clubs or Organizations: Yes    Attends Club or Organization Meetings: More than 4 times per year    Marital Status:    Housing Stability: Low Risk     Unable to Pay for Housing in the Last Year: No    Number of Places Lived in the Last Year: 1    Unstable Housing in the Last Year: No       Review of Systems -    Respiratory : no cough, shortness of breath, or wheezing  Cardiovascular  no chest pain or dyspnea on exertion  Gastrointestinal no abdominal pain, change in bowel habits, or black or bloody stools  Musculoskeletal no deformities, swelling  Neurological no TIA or stroke symptoms        Physical Exam:  General: NAD  AT NC EOMI  Mallampati Score   Neck supple, trachea midline  Lungs: nl excursions, no retractions.  Breathing comfortably  Abdomen ND soft NT.  No masses  Extremities: No CCE.      ASA:  II    PLAN  COLONOSCOPY.  The details of the procedure, the possible need for biopsy or polypectomy and the potential risks including bleeding, perforation, missed polyps were discussed in detail.

## 2022-05-31 NOTE — [employer] PATIENT INSTRUCTIONS
Discharge Summary/Instructions after an Endoscopic Procedure  Patient Name: Jordin Allen  Patient MRN: [id number]  Patient YOB: 1945  Tuesday, May 31, 2022  Kenny Farris MD  Dear patient,  As a result of recent federal legislation (The Federal Cures Act), you may   receive lab or pathology results from your procedure in your [facility]   account before your physician is able to contact you. Your physician or   their representative will relay the results to you with their   recommendations at their soonest availability.  Thank you,  RESTRICTIONS:  During your procedure today, you received medications for sedation.  These   medications may affect your judgment, balance and coordination.  Therefore,   for 24 hours, you have the following restrictions:   - DO NOT drive a car, operate machinery, make legal/financial decisions,   sign important papers or drink alcohol.    ACTIVITY:  Today: no heavy lifting, straining or running due to procedural   sedation/anesthesia.  The following day: return to full activity including work.  DIET:  Eat and drink normally unless instructed otherwise.     TREATMENT FOR COMMON SIDE EFFECTS:  - Mild abdominal pain, nausea, belching, bloating or excessive gas:  rest,   eat lightly and use a heating pad.  - Sore Throat: treat with throat lozenges and/or gargle with warm salt   water.  - Because air was used during the procedure, expelling large amounts of air   from your rectum or belching is normal.  - If a bowel prep was taken, you may not have a bowel movement for 1-3 days.    This is normal.  SYMPTOMS TO WATCH FOR AND REPORT TO YOUR PHYSICIAN:  1. Abdominal pain or bloating, other than gas cramps.  2. Chest pain.  3. Back pain.  4. Signs of infection such as: chills or fever occurring within 24 hours   after the procedure.  5. Rectal bleeding, which would show as bright red, maroon, or black stools.   (A tablespoon of blood from the rectum is not serious, especially if    hemorrhoids are present.)  6. Vomiting.  7. Weakness or dizziness.  GO DIRECTLY TO THE NEAREST EMERGENCY ROOM IF YOU HAVE ANY OF THE FOLLOWING:      Difficulty breathing              Chills and/or fever over 101 F   Persistent vomiting and/or vomiting blood   Severe abdominal pain   Severe chest pain   Black, tarry stools   Bleeding- more than one tablespoon   Any other symptom or condition that you feel may need urgent attention  Your doctor recommends these additional instructions:  If any biopsies were taken, your doctors clinic will contact you in 1 to 2   weeks with any results.  - Discharge patient to home (ambulatory).   - Patient has a contact number available for emergencies.  The signs and   symptoms of potential delayed complications were discussed with the   patient.  Return to normal activities tomorrow.  Written discharge   instructions were provided to the patient.   - Resume previous diet.   - Continue present medications.   - Await pathology results.   - Repeat colonoscopy in 3 years for surveillance based on pathology   results.  For questions, problems or results please call your physician - Kenny Farris MD at Work:  [phone].  [facility] [place], EMERGENCY ROOM PHONE NUMBER: [phone]  IF A COMPLICATION OR EMERGENCY SITUATION ARISES AND YOU ARE UNABLE TO REACH   YOUR PHYSICIAN - GO DIRECTLY TO THE EMERGENCY ROOM.  Kenny Farris MD  5/31/2022 10:31:14 AM  This report has been verified and signed electronically.  Dear patient,  As a result of recent federal legislation (The Federal Cures Act), you may   receive lab or pathology results from your procedure in your [facility]   account before your physician is able to contact you. Your physician or   their representative will relay the results to you with their   recommendations at their soonest availability.  Thank you,  [employer]

## 2022-05-31 NOTE — ANESTHESIA PREPROCEDURE EVALUATION
2022  Jordin Allen Jr. is a 76 y.o., male.  Pre-operative evaluation for COLONOSCOPY (N/A)    Chief Complaint:colon polyps    PMH:  Severe obesity  CAD-cath  with stent of LAD, recent f/u with cardiology, baby ASA last night  SHOLA-using CPAP  HTN  Former smoker with emphysema  - Symbicort-stable  restless leg syndrome  Past Surgical History:   Procedure Laterality Date    bilateral foot surgery      CARDIAC CATHETERIZATION  2013    x1    CATARACT EXTRACTION, BILATERAL      COLON SURGERY      Ace Mott MD    COLONOSCOPY N/A 3/21/2018    Procedure: COLONOSCOPY;  Surgeon: Terry Mott MD;  Location: [facility] ([address] [place] [address]);  Service: Endoscopy;  Laterality: N/A;    COLONOSCOPY N/A 2019    Procedure: COLONOSCOPY;  Surgeon: John Mantilla MD;  Location: [facility] ([address] [place] [address]);  Service: Endoscopy;  Laterality: N/A;    scrotal abscess removal           Vital Signs Range (Last 24H):         CBC:   No results for input(s): WBC, RBC, HGB, HCT, PLT, MCV, MCH, MCHC in the last 720 hours.    CMP: No results for input(s): NA, K, CL, CO2, BUN, CREATININE, GLU, MG, PHOS, CALCIUM, ALBUMIN, PROT, ALKPHOS, ALT, AST, BILITOT in the last 720 hours.    INR:  No results for input(s): PT, INR, PROTIME, APTT in the last 720 hours.      Diagnostic Studies:      EKD Echo:  2015-EF normal    Pre-op Assessment    I have reviewed the Patient Summary Reports.     I have reviewed the Nursing Notes. I have reviewed the NPO Status.   I have reviewed the Medications.     Review of Systems  Anesthesia Hx:  No problems with previous Anesthesia    Social:  Former Smoker        Physical Exam  General: Well nourished, Cooperative, Alert and Oriented    Airway:  Mallampati: III / II  Mouth Opening: Normal  TM Distance: Normal  Tongue: Normal  Neck ROM: Normal  ROM    Dental:  Intact    Chest/Lungs:  Normal Respiratory Rate        Anesthesia Plan  Type of Anesthesia, risks & benefits discussed:    Anesthesia Type: Gen Natural Airway  Intra-op Monitoring Plan: Standard ASA Monitors  Post Op Pain Control Plan: multimodal analgesia  Induction:  IV  Informed Consent: Informed consent signed with the Patient and all parties understand the risks and agree with anesthesia plan.  All questions answered.   ASA Score: 3  Day of Surgery Review of History & Physical: H&P Update referred to the surgeon/provider.    Ready For Surgery From Anesthesia Perspective.     .

## 2022-06-03 LAB
FINAL PATHOLOGIC DIAGNOSIS: NORMAL
GROSS: NORMAL
Lab: NORMAL

## 2022-06-30 ENCOUNTER — EXTERNAL CHRONIC CARE MANAGEMENT (OUTPATIENT)
Dept: PRIMARY CARE CLINIC | Facility: CLINIC | Age: 77
End: 2022-06-30
Payer: MEDICARE

## 2022-06-30 PROCEDURE — 99490 CHRNC CARE MGMT STAFF 1ST 20: CPT | Mod: S$PBB,,, | Performed by: INTERNAL MEDICINE

## 2022-06-30 PROCEDURE — 99490 CHRNC CARE MGMT STAFF 1ST 20: CPT | Mod: PBBFAC,PO | Performed by: INTERNAL MEDICINE

## 2022-06-30 PROCEDURE — 99490 PR CHRONIC CARE MGMT, 1ST 20 MIN: ICD-10-PCS | Mod: S$PBB,,, | Performed by: INTERNAL MEDICINE

## 2022-07-13 NOTE — TELEPHONE ENCOUNTER
All orders, sleep study results, and chart notes have been assembled and provided to Maria Eugenia, referral coord, to fax to [facility].   c/o intermittent numbness sensation to r side of face/body since 0730 denies any headache or dizziness denies any weakness no facial asymmetry or speech deficit noted no focal neuro deficits noted hx htn and diabetes states compliant with rx meds

## 2022-07-31 ENCOUNTER — EXTERNAL CHRONIC CARE MANAGEMENT (OUTPATIENT)
Dept: PRIMARY CARE CLINIC | Facility: CLINIC | Age: 77
End: 2022-07-31
Payer: MEDICARE

## 2022-07-31 PROCEDURE — 99490 PR CHRONIC CARE MGMT, 1ST 20 MIN: ICD-10-PCS | Mod: S$PBB,,, | Performed by: INTERNAL MEDICINE

## 2022-07-31 PROCEDURE — 99490 CHRNC CARE MGMT STAFF 1ST 20: CPT | Mod: S$PBB,,, | Performed by: INTERNAL MEDICINE

## 2022-07-31 PROCEDURE — 99490 CHRNC CARE MGMT STAFF 1ST 20: CPT | Mod: PBBFAC,PO | Performed by: INTERNAL MEDICINE

## 2022-08-08 NOTE — PROGRESS NOTES
NUTRITIONAL CONSULT    Referring Physician: Jordin Cornejo M.D.  Reason for MNT Referral: Initial assessment for sleeve gastrectomy work-up    PAST MEDICAL HISTORY:   71 y.o. male presents with a BMI of Body mass index is 35.42 kg/(m^2).  Weight history includes He states he has always been on the heavier side since a teenager. He states that he gained weight surrounding his surgeries in  and then his wife  around that same time. He states that he has not had success in weight loss since. His weight prior to this was 210 pounds. He is currently at his heaviest adult weight. In the 70's he was in the  and his weight was down. Lowest weight about 165-170 lbs; about 30 years ago.   Dieting attempts include The patient has tried low calorie dieting and weight loss clinic about 30 years ago. He states that he was able to lose about 30-35 pounds through the weight loss clinic.    Past Medical History:   Diagnosis Date    Benign neoplasm of colon 10/1/2013    Bronchitis chronic     CAD (coronary artery disease) 10/31/2013    COPD (chronic obstructive pulmonary disease)     Emphysema of lung     GERD (gastroesophageal reflux disease)     Hx of colonic polyps     Hypertension     SHOLA on CPAP     RLS (restless legs syndrome)     Screen for colon cancer 2013       CLINICAL DATA:  71 y.o.-year-old White male.  Height: 5 ft 9 in  Weight: 239 lbs  IBW: 160 lbs  BMI: 35.4  The patient's goal weight (50% EBW): 199 lbs  Personal goal weight: 185-190 lbs     Goal for Bariatric Surgery: to improve health, to improve quality of life and to lose weight    NUTRITION & HEALTH HISTORY:  Greatest challenge: sweets, starchy CHO, portion control and irregular meal patterns    Current diet recall:   B: 2 cups of coffee with S&L and creamer, 2 glasses of water usually skips  Snk: yogurt (Oikos)  L: Grilled Cheese [place], 6 oz glass of 1% milk  D: 1 cup cooked rice, 1 cup red beans with sausage, 6 oz diet  iced tea  Snk: 1 cup of coffee with S&L and creamer, 2 cece crackers  Snk: 1 cup 1% milk, 1 apple   Coffee intake 4-5 cups/day    Current Diet:  Meal pattern: 2 meals/day  Protein supplements: none  Snackinx / day (pretzels, peanuts)  Vegetables: Likes a variety. Eats 2-3 times per week.  Fruits: Likes a variety. Eats daily.  Beverages: diet tea, coffee without sugar and 1% milk  Dining out: Monthly. Mostly restaurants.  Cooking at home: Weekly. (quick cooking or frozen dinners) Mostly baked, grilled and sauteed with olive oil meat, fish, starchy CHO and vegetables.    Exercise:  Past exercise: None   Played golf  Current exercise: Adequate   Plays gold 3-4 x/week, treadmill (30mins) & bike (15mins) 2 x/wk   Restrictions to exercise: none    Vitamins / Minerals / Herbs:   Ocuvite    Food Allergies:   No known    Social:  Retired.  Lives with alone.  Grocery shopping and food prep done by patient.  Patient believes his family will be supportive after surgery. (daughter had the surgery)  Alcohol: Socially.   Smoking: None. 40 year smoker-quit 5 years ago    ASSESSMENT:  · Patient reports attempts at weight loss, only to regain lost weight.  · Patient demonstrated knowledge of healthy eating behaviors and exercise patterns; admits to not eating healthy and not exercising at this point.  · Patient states willingness to change lifestyle and make behavior modifications.  · Expect good  compliance after surgery at this time.    Insurance requires 3 months medically supervised diet prior to consideration for bariatric surgery.    BARIATRIC DIET DISCUSSION:  Discussed diet after surgery and related to patients food record.  Reviewed diet progression before and after surgery.  Reinforced that surgery is not a magic bullet and importance of low fat foods and no snacking.  Stressed importance of exercise and its role in achieving weight loss goals.  Answered all questions.    RECOMMENDATIONS:  Patient is a potential  candidate for bariatric surgery.    Needs 2 additional visit(s) with RD.    PLAN:  Continue to review Bariatric Nutrition Guidebook at home and call with any questions.  Work on Bariatric Nutrition Checklist.  Work on expanding variety of vegetables.  Work on gradually cutting back on starchy CHO in the diet.  Begin trying various protein supplements to determine preference.  1500-calorie diet.  5-6 meals per day.  Start including protein supplements in the diet plan daily.  Reduce frequency of dining out.  More grocery shopping and meal preparation at home.  Increase exercise.  Return to clinic.    SESSION TIME:  60 minutes   13 Hour(s) 20 Minute(s)

## 2022-08-30 DIAGNOSIS — J44.9 CHRONIC OBSTRUCTIVE PULMONARY DISEASE, UNSPECIFIED COPD TYPE: ICD-10-CM

## 2022-08-31 ENCOUNTER — EXTERNAL CHRONIC CARE MANAGEMENT (OUTPATIENT)
Dept: PRIMARY CARE CLINIC | Facility: CLINIC | Age: 77
End: 2022-08-31
Payer: MEDICARE

## 2022-08-31 PROCEDURE — 99490 PR CHRONIC CARE MGMT, 1ST 20 MIN: ICD-10-PCS | Mod: S$PBB,,, | Performed by: INTERNAL MEDICINE

## 2022-08-31 PROCEDURE — 99490 CHRNC CARE MGMT STAFF 1ST 20: CPT | Mod: PBBFAC,PO | Performed by: INTERNAL MEDICINE

## 2022-08-31 PROCEDURE — 99490 CHRNC CARE MGMT STAFF 1ST 20: CPT | Mod: S$PBB,,, | Performed by: INTERNAL MEDICINE

## 2022-08-31 RX ORDER — BUDESONIDE AND FORMOTEROL FUMARATE DIHYDRATE 160; 4.5 UG/1; UG/1
AEROSOL RESPIRATORY (INHALATION)
Qty: 30.6 G | Refills: 3 | Status: SHIPPED | OUTPATIENT
Start: 2022-08-31 | End: 2022-09-01 | Stop reason: SDUPTHER

## 2022-09-01 DIAGNOSIS — J44.9 CHRONIC OBSTRUCTIVE PULMONARY DISEASE, UNSPECIFIED COPD TYPE: ICD-10-CM

## 2022-09-01 RX ORDER — BUDESONIDE AND FORMOTEROL FUMARATE DIHYDRATE 160; 4.5 UG/1; UG/1
AEROSOL RESPIRATORY (INHALATION)
Qty: 30.6 G | Refills: 3 | Status: SHIPPED | OUTPATIENT
Start: 2022-09-01 | End: 2023-10-12 | Stop reason: SDUPTHER

## 2022-09-30 ENCOUNTER — EXTERNAL CHRONIC CARE MANAGEMENT (OUTPATIENT)
Dept: PRIMARY CARE CLINIC | Facility: CLINIC | Age: 77
End: 2022-09-30
Payer: MEDICARE

## 2022-09-30 PROCEDURE — 99490 PR CHRONIC CARE MGMT, 1ST 20 MIN: ICD-10-PCS | Mod: S$PBB,,, | Performed by: INTERNAL MEDICINE

## 2022-09-30 PROCEDURE — 99490 CHRNC CARE MGMT STAFF 1ST 20: CPT | Mod: PBBFAC,PO | Performed by: INTERNAL MEDICINE

## 2022-09-30 PROCEDURE — 99490 CHRNC CARE MGMT STAFF 1ST 20: CPT | Mod: S$PBB,,, | Performed by: INTERNAL MEDICINE

## 2022-10-13 ENCOUNTER — TELEPHONE (OUTPATIENT)
Dept: AUDIOLOGY | Facility: CLINIC | Age: 77
End: 2022-10-13

## 2022-10-13 ENCOUNTER — CLINICAL SUPPORT (OUTPATIENT)
Dept: AUDIOLOGY | Facility: CLINIC | Age: 77
End: 2022-10-13
Payer: MEDICARE

## 2022-10-13 DIAGNOSIS — H90.3 SENSORINEURAL HEARING LOSS, BILATERAL: Primary | ICD-10-CM

## 2022-10-13 PROCEDURE — 99499 NO LOS: ICD-10-PCS | Mod: S$PBB,,, | Performed by: AUDIOLOGIST

## 2022-10-13 PROCEDURE — 99499 UNLISTED E&M SERVICE: CPT | Mod: S$PBB,,, | Performed by: AUDIOLOGIST

## 2022-10-13 NOTE — PROGRESS NOTES
Jordin Allen Jr., a 77 y.o. male, was seen today for a hearing aid consultation. Patient is an experienced hearing aid user. He had Phonak hearing aids since 2017, however he had recurrent issues with the hearing aids and wants to try a different . We discussed the patient's hearing loss and its effects on his daily life in detail.  Pt stated the hearing loss is causing difficulty understanding speech and communicating with his family and friends.  Hearing aid options were presented.  Pt selected Widex Moment 330s.  A hearing aid order form was completed and signed.  Patient acknowledged understanding of the 30 day trial period, $250 restocking fee from the time of order, and the fact that we do not file insurance on behalf of the patient. Patient has Federal BCBS and will verify coverage with insurance company prior to placing the order.  Once he has confirmed insurance coverage Mr. Allen will contact the office and the order will be placed at that time.  Pt needs to be scheduled with an audiogram prior to HAC.

## 2022-10-14 ENCOUNTER — TELEPHONE (OUTPATIENT)
Dept: AUDIOLOGY | Facility: CLINIC | Age: 77
End: 2022-10-14
Payer: MEDICARE

## 2022-10-14 DIAGNOSIS — H90.3 SENSORINEURAL HEARING LOSS, BILATERAL: Primary | ICD-10-CM

## 2022-10-18 ENCOUNTER — TELEPHONE (OUTPATIENT)
Dept: INTERNAL MEDICINE | Facility: CLINIC | Age: 77
End: 2022-10-18
Payer: MEDICARE

## 2022-10-18 NOTE — TELEPHONE ENCOUNTER
----- Message from Cosmo Campbell sent at 10/18/2022  9:29 AM CDT -----  Contact: [phone]  Caller is requesting an earlier appointment then we can schedule.  Caller is requesting a message be sent to the provider.  If this is for urgent care symptoms, did you offer other providers at this location, providers at other locations, or [facility]? (yes, no, n/a):  yes  If this is for the patients physical, did you offer to schedule next available and put on wait list, or to see NP or PA for their physical?  (yes, no, n/a):  yes  When is the next available appointment with their provider:  April  Reason for the appointment:  Annual   Patient preference of timeframe to be scheduled:  Dec  Would the patient like a call back, or a response through their [facility] portal?:   call back   Comments:      Pt wants a sooner appt then April for his annual that is due in Dec.

## 2022-10-20 ENCOUNTER — IMMUNIZATION (OUTPATIENT)
Dept: INTERNAL MEDICINE | Facility: CLINIC | Age: 77
End: 2022-10-20
Payer: MEDICARE

## 2022-10-20 DIAGNOSIS — Z23 NEED FOR VACCINATION: Primary | ICD-10-CM

## 2022-10-20 PROCEDURE — 0124A COVID-19, MRNA, LNP-S, BIVALENT BOOSTER, PF, 30 MCG/0.3 ML DOSE: CPT | Mod: PBBFAC,PO

## 2022-10-20 PROCEDURE — 91312 COVID-19, MRNA, LNP-S, BIVALENT BOOSTER, PF, 30 MCG/0.3 ML DOSE: CPT | Mod: PBBFAC,PO

## 2022-10-31 ENCOUNTER — EXTERNAL CHRONIC CARE MANAGEMENT (OUTPATIENT)
Dept: PRIMARY CARE CLINIC | Facility: CLINIC | Age: 77
End: 2022-10-31
Payer: MEDICARE

## 2022-10-31 PROCEDURE — 99490 PR CHRONIC CARE MGMT, 1ST 20 MIN: ICD-10-PCS | Mod: S$PBB,,, | Performed by: INTERNAL MEDICINE

## 2022-10-31 PROCEDURE — 99490 CHRNC CARE MGMT STAFF 1ST 20: CPT | Mod: PBBFAC,PO | Performed by: INTERNAL MEDICINE

## 2022-10-31 PROCEDURE — 99490 CHRNC CARE MGMT STAFF 1ST 20: CPT | Mod: S$PBB,,, | Performed by: INTERNAL MEDICINE

## 2022-11-30 ENCOUNTER — EXTERNAL CHRONIC CARE MANAGEMENT (OUTPATIENT)
Dept: PRIMARY CARE CLINIC | Facility: CLINIC | Age: 77
End: 2022-11-30
Payer: MEDICARE

## 2022-11-30 PROCEDURE — 99490 CHRNC CARE MGMT STAFF 1ST 20: CPT | Mod: S$PBB,,, | Performed by: INTERNAL MEDICINE

## 2022-11-30 PROCEDURE — 99490 CHRNC CARE MGMT STAFF 1ST 20: CPT | Mod: PBBFAC,PO | Performed by: INTERNAL MEDICINE

## 2022-11-30 PROCEDURE — 99490 PR CHRONIC CARE MGMT, 1ST 20 MIN: ICD-10-PCS | Mod: S$PBB,,, | Performed by: INTERNAL MEDICINE

## 2022-12-01 ENCOUNTER — OFFICE VISIT (OUTPATIENT)
Dept: URGENT CARE | Facility: CLINIC | Age: 77
End: 2022-12-01
Payer: MEDICARE

## 2022-12-01 ENCOUNTER — CLINICAL SUPPORT (OUTPATIENT)
Dept: URGENT CARE | Facility: CLINIC | Age: 77
End: 2022-12-01
Payer: MEDICARE

## 2022-12-01 VITALS
BODY MASS INDEX: 34.44 KG/M2 | OXYGEN SATURATION: 97 % | RESPIRATION RATE: 16 BRPM | HEART RATE: 90 BPM | WEIGHT: 240 LBS | SYSTOLIC BLOOD PRESSURE: 153 MMHG | TEMPERATURE: 98 F | DIASTOLIC BLOOD PRESSURE: 88 MMHG

## 2022-12-01 DIAGNOSIS — R50.9 FEVER, UNSPECIFIED FEVER CAUSE: Primary | ICD-10-CM

## 2022-12-01 DIAGNOSIS — J06.9 UPPER RESPIRATORY TRACT INFECTION, UNSPECIFIED TYPE: Primary | ICD-10-CM

## 2022-12-01 LAB
CTP QC/QA: YES
CTP QC/QA: YES
POC MOLECULAR INFLUENZA A AGN: NEGATIVE
POC MOLECULAR INFLUENZA B AGN: NEGATIVE
SARS-COV-2 AG RESP QL IA.RAPID: NEGATIVE

## 2022-12-01 PROCEDURE — 99214 OFFICE O/P EST MOD 30 MIN: CPT | Mod: S$GLB,CS,, | Performed by: FAMILY MEDICINE

## 2022-12-01 PROCEDURE — 99214 PR OFFICE/OUTPT VISIT, EST, LEVL IV, 30-39 MIN: ICD-10-PCS | Mod: S$GLB,CS,, | Performed by: FAMILY MEDICINE

## 2022-12-01 PROCEDURE — 87502 INFLUENZA DNA AMP PROBE: CPT | Mod: QW,S$GLB,, | Performed by: FAMILY MEDICINE

## 2022-12-01 PROCEDURE — 87811 SARS-COV-2 COVID19 W/OPTIC: CPT | Mod: QW,CR,S$GLB, | Performed by: FAMILY MEDICINE

## 2022-12-01 PROCEDURE — 87502 POCT INFLUENZA A/B MOLECULAR: ICD-10-PCS | Mod: QW,S$GLB,, | Performed by: FAMILY MEDICINE

## 2022-12-01 PROCEDURE — 87811 SARS CORONAVIRUS 2 ANTIGEN POCT, MANUAL READ: ICD-10-PCS | Mod: QW,CR,S$GLB, | Performed by: FAMILY MEDICINE

## 2022-12-01 RX ORDER — PROMETHAZINE HYDROCHLORIDE AND DEXTROMETHORPHAN HYDROBROMIDE 6.25; 15 MG/5ML; MG/5ML
5 SYRUP ORAL NIGHTLY PRN
Qty: 118 ML | Refills: 0 | Status: SHIPPED | OUTPATIENT
Start: 2022-12-01 | End: 2022-12-11

## 2022-12-01 RX ORDER — FLUTICASONE PROPIONATE 50 MCG
1 SPRAY, SUSPENSION (ML) NASAL DAILY
Qty: 9.9 ML | Refills: 0 | Status: SHIPPED | OUTPATIENT
Start: 2022-12-01 | End: 2023-06-30

## 2022-12-01 RX ORDER — BENZONATATE 100 MG/1
100 CAPSULE ORAL EVERY 6 HOURS PRN
Qty: 30 CAPSULE | Refills: 1 | Status: SHIPPED | OUTPATIENT
Start: 2022-12-01 | End: 2023-05-29 | Stop reason: ALTCHOICE

## 2022-12-01 NOTE — PROGRESS NOTES
Subjective:       Patient ID: Jordin Allen Jr. is a 77 y.o. male.    Vitals:  vitals were not taken for this visit.     Chief Complaint: Labs Only    Pt started with a fever of 100.7. pt was seen earlier today by Dr Borden returned for a flu test. Pt tested negative for flu. Pt advised to take Tylenol for the fever, continue with the Coricidin, and Tessalon pearls. Also can take Regular Mucinex OTC, and Flonase as instructed. If sx worsen he may need to be retested. Pt verbalized understanding.   ROS    Objective:      Physical Exam      Assessment:       1. Fever, unspecified fever cause            Plan:         Fever, unspecified fever cause  -     POCT Influenza A/B MOLECULAR

## 2022-12-01 NOTE — PROGRESS NOTES
Subjective:       Patient ID: Jordin Allen Jr. is a 77 y.o. male.    Vitals:  weight is 108.9 kg (240 lb). His oral temperature is 98.2 °F (36.8 °C). His blood pressure is 153/88 (abnormal) and his pulse is 90. His respiration is 16 and oxygen saturation is 97%.     Chief Complaint: Cough    This is a 77 y.o. male who presents today with a chief complaint of cough (from throat - not very productive, just phlegm), bilateral ear congestion, nasal congestion and nasal pressure since last night. No fever, no sore throat    Pt hx: COPD, stent in heart, needs hearing aids but has to wait until January, sleep apnea    At home COVID - negative    At home tx: chororcedin, mucinex - no relief; nebulizer tx last night    Cough  The cough is Productive of sputum. Associated symptoms include chills. Pertinent negatives include no chest pain, ear pain, headaches, sore throat or shortness of breath. His past medical history is significant for COPD.     Constitution: Positive for chills.   HENT:  Negative for ear pain and sore throat.    Cardiovascular:  Negative for chest pain.   Respiratory:  Positive for cough. Negative for shortness of breath.    Neurological:  Negative for headaches.     Objective:      Physical Exam   HENT:   Head: Normocephalic and atraumatic.   Nose: Congestion present. No rhinorrhea.   Mouth/Throat: Mucous membranes are moist. Posterior oropharyngeal erythema (mild) present.   Eyes: Pupils are equal, round, and reactive to light. Extraocular movement intact   Neck: Neck supple.   Cardiovascular: Normal rate, regular rhythm, normal heart sounds and normal pulses.   Pulmonary/Chest: Effort normal and breath sounds normal.   Abdominal: Normal appearance and bowel sounds are normal. Soft.   Neurological: He is alert.   Nursing note and vitals reviewed.      Results for orders placed or performed in visit on 12/01/22   SARS Coronavirus 2 Antigen, POCT Manual Read   Result Value Ref Range    SARS Coronavirus  2 Antigen Negative Negative     Acceptable Yes       Assessment:       1. Upper respiratory tract infection, unspecified type          Plan:         Upper respiratory tract infection, unspecified type  -     SARS Coronavirus 2 Antigen, POCT Manual Read  -     fluticasone propionate (FLONASE) 50 mcg/actuation nasal spray; 1 spray (50 mcg total) by Each Nostril route once daily.  Dispense: 9.9 mL; Refill: 0  -     benzonatate (TESSALON PERLES) 100 MG capsule; Take 1 capsule (100 mg total) by mouth every 6 (six) hours as needed for Cough.  Dispense: 30 capsule; Refill: 1  -     promethazine-dextromethorphan (PROMETHAZINE-DM) 6.25-15 mg/5 mL Syrp; Take 5 mLs by mouth nightly as needed (cough).  Dispense: 118 mL; Refill: 0

## 2022-12-03 ENCOUNTER — HOSPITAL ENCOUNTER (INPATIENT)
Facility: HOSPITAL | Age: 77
LOS: 2 days | Discharge: HOME | DRG: 193 | End: 2022-12-06
Attending: EMERGENCY MEDICINE | Admitting: HOSPITALIST
Payer: MEDICARE

## 2022-12-03 DIAGNOSIS — J10.1 INFLUENZA A H1N1 INFECTION: ICD-10-CM

## 2022-12-03 DIAGNOSIS — J43.2 CENTRILOBULAR EMPHYSEMA: ICD-10-CM

## 2022-12-03 DIAGNOSIS — R06.02 SOB (SHORTNESS OF BREATH): ICD-10-CM

## 2022-12-03 DIAGNOSIS — J96.01 ACUTE HYPOXEMIC RESPIRATORY FAILURE: Primary | ICD-10-CM

## 2022-12-03 DIAGNOSIS — R06.02 SHORTNESS OF BREATH: ICD-10-CM

## 2022-12-03 PROCEDURE — 84484 ASSAY OF TROPONIN QUANT: CPT | Performed by: EMERGENCY MEDICINE

## 2022-12-03 PROCEDURE — 94761 N-INVAS EAR/PLS OXIMETRY MLT: CPT

## 2022-12-03 PROCEDURE — 80053 COMPREHEN METABOLIC PANEL: CPT | Performed by: EMERGENCY MEDICINE

## 2022-12-03 PROCEDURE — 83880 ASSAY OF NATRIURETIC PEPTIDE: CPT | Performed by: EMERGENCY MEDICINE

## 2022-12-03 PROCEDURE — 87502 INFLUENZA DNA AMP PROBE: CPT

## 2022-12-03 PROCEDURE — 86140 C-REACTIVE PROTEIN: CPT | Performed by: EMERGENCY MEDICINE

## 2022-12-03 PROCEDURE — 84145 PROCALCITONIN (PCT): CPT | Performed by: EMERGENCY MEDICINE

## 2022-12-03 PROCEDURE — 87635 SARS-COV-2 COVID-19 AMP PRB: CPT | Performed by: EMERGENCY MEDICINE

## 2022-12-03 PROCEDURE — 27000221 HC OXYGEN, UP TO 24 HOURS

## 2022-12-03 PROCEDURE — 99285 EMERGENCY DEPT VISIT HI MDM: CPT | Mod: 25

## 2022-12-03 PROCEDURE — 85025 COMPLETE CBC W/AUTO DIFF WBC: CPT | Performed by: EMERGENCY MEDICINE

## 2022-12-03 PROCEDURE — 25000242 PHARM REV CODE 250 ALT 637 W/ HCPCS: Performed by: EMERGENCY MEDICINE

## 2022-12-03 PROCEDURE — 93010 EKG 12-LEAD: ICD-10-PCS | Mod: ,,, | Performed by: INTERNAL MEDICINE

## 2022-12-03 PROCEDURE — 93005 ELECTROCARDIOGRAM TRACING: CPT

## 2022-12-03 PROCEDURE — 93010 ELECTROCARDIOGRAM REPORT: CPT | Mod: ,,, | Performed by: INTERNAL MEDICINE

## 2022-12-03 PROCEDURE — 94640 AIRWAY INHALATION TREATMENT: CPT

## 2022-12-03 RX ORDER — IPRATROPIUM BROMIDE AND ALBUTEROL SULFATE 2.5; .5 MG/3ML; MG/3ML
3 SOLUTION RESPIRATORY (INHALATION)
Status: COMPLETED | OUTPATIENT
Start: 2022-12-03 | End: 2022-12-03

## 2022-12-03 RX ADMIN — IPRATROPIUM BROMIDE AND ALBUTEROL SULFATE 3 ML: .5; 2.5 SOLUTION RESPIRATORY (INHALATION) at 11:12

## 2022-12-04 PROBLEM — J96.01 ACUTE RESPIRATORY FAILURE WITH HYPOXIA: Status: ACTIVE | Noted: 2022-12-04

## 2022-12-04 PROBLEM — J11.1 INFLUENZA: Status: ACTIVE | Noted: 2022-12-04

## 2022-12-04 LAB
ALBUMIN SERPL BCP-MCNC: 4 G/DL (ref 3.5–5.2)
ALP SERPL-CCNC: 98 U/L (ref 55–135)
ALT SERPL W/O P-5'-P-CCNC: 31 U/L (ref 10–44)
ANION GAP SERPL CALC-SCNC: 14 MMOL/L (ref 8–16)
ANION GAP SERPL CALC-SCNC: 17 MMOL/L (ref 8–16)
AST SERPL-CCNC: 26 U/L (ref 10–40)
BASOPHILS # BLD AUTO: 0.01 K/UL (ref 0–0.2)
BASOPHILS NFR BLD: 0.1 % (ref 0–1.9)
BILIRUB SERPL-MCNC: 1.3 MG/DL (ref 0.1–1)
BNP SERPL-MCNC: 13 PG/ML (ref 0–99)
BUN SERPL-MCNC: 15 MG/DL (ref 8–23)
BUN SERPL-MCNC: 15 MG/DL (ref 8–23)
CALCIUM SERPL-MCNC: 9 MG/DL (ref 8.7–10.5)
CALCIUM SERPL-MCNC: 9.3 MG/DL (ref 8.7–10.5)
CHLORIDE SERPL-SCNC: 96 MMOL/L (ref 95–110)
CHLORIDE SERPL-SCNC: 98 MMOL/L (ref 95–110)
CO2 SERPL-SCNC: 23 MMOL/L (ref 23–29)
CO2 SERPL-SCNC: 23 MMOL/L (ref 23–29)
CREAT SERPL-MCNC: 1 MG/DL (ref 0.5–1.4)
CREAT SERPL-MCNC: 1 MG/DL (ref 0.5–1.4)
CRP SERPL-MCNC: 73.9 MG/L (ref 0–8.2)
CTP QC/QA: YES
CTP QC/QA: YES
DIFFERENTIAL METHOD: ABNORMAL
EOSINOPHIL # BLD AUTO: 0 K/UL (ref 0–0.5)
EOSINOPHIL NFR BLD: 0 % (ref 0–8)
ERYTHROCYTE [DISTWIDTH] IN BLOOD BY AUTOMATED COUNT: 14.9 % (ref 11.5–14.5)
EST. GFR  (NO RACE VARIABLE): >60 ML/MIN/1.73 M^2
EST. GFR  (NO RACE VARIABLE): >60 ML/MIN/1.73 M^2
GLUCOSE SERPL-MCNC: 148 MG/DL (ref 70–110)
GLUCOSE SERPL-MCNC: 241 MG/DL (ref 70–110)
HCT VFR BLD AUTO: 46.3 % (ref 40–54)
HGB BLD-MCNC: 15 G/DL (ref 14–18)
IMM GRANULOCYTES # BLD AUTO: 0.04 K/UL (ref 0–0.04)
IMM GRANULOCYTES NFR BLD AUTO: 0.4 % (ref 0–0.5)
LYMPHOCYTES # BLD AUTO: 0.7 K/UL (ref 1–4.8)
LYMPHOCYTES NFR BLD: 6.2 % (ref 18–48)
MAGNESIUM SERPL-MCNC: 1.8 MG/DL (ref 1.6–2.6)
MCH RBC QN AUTO: 26 PG (ref 27–31)
MCHC RBC AUTO-ENTMCNC: 32.4 G/DL (ref 32–36)
MCV RBC AUTO: 80 FL (ref 82–98)
MONOCYTES # BLD AUTO: 1.2 K/UL (ref 0.3–1)
MONOCYTES NFR BLD: 10.7 % (ref 4–15)
NEUTROPHILS # BLD AUTO: 8.9 K/UL (ref 1.8–7.7)
NEUTROPHILS NFR BLD: 82.6 % (ref 38–73)
NRBC BLD-RTO: 0 /100 WBC
OB PNL STL: NEGATIVE
PLATELET # BLD AUTO: 176 K/UL (ref 150–450)
PMV BLD AUTO: 10.3 FL (ref 9.2–12.9)
POC MOLECULAR INFLUENZA A AGN: POSITIVE
POC MOLECULAR INFLUENZA B AGN: NEGATIVE
POTASSIUM SERPL-SCNC: 3.1 MMOL/L (ref 3.5–5.1)
POTASSIUM SERPL-SCNC: 3.4 MMOL/L (ref 3.5–5.1)
PROCALCITONIN SERPL IA-MCNC: 0.09 NG/ML
PROT SERPL-MCNC: 8.2 G/DL (ref 6–8.4)
RBC # BLD AUTO: 5.76 M/UL (ref 4.6–6.2)
SARS-COV-2 RDRP RESP QL NAA+PROBE: NEGATIVE
SODIUM SERPL-SCNC: 135 MMOL/L (ref 136–145)
SODIUM SERPL-SCNC: 136 MMOL/L (ref 136–145)
TROPONIN I SERPL DL<=0.01 NG/ML-MCNC: 0.02 NG/ML (ref 0–0.03)
WBC # BLD AUTO: 10.79 K/UL (ref 3.9–12.7)
WBC #/AREA STL HPF: NORMAL /[HPF]

## 2022-12-04 PROCEDURE — 83735 ASSAY OF MAGNESIUM: CPT | Performed by: INTERNAL MEDICINE

## 2022-12-04 PROCEDURE — 87045 FECES CULTURE AEROBIC BACT: CPT | Performed by: NURSE PRACTITIONER

## 2022-12-04 PROCEDURE — 82272 OCCULT BLD FECES 1-3 TESTS: CPT | Performed by: NURSE PRACTITIONER

## 2022-12-04 PROCEDURE — 25000003 PHARM REV CODE 250: Performed by: INTERNAL MEDICINE

## 2022-12-04 PROCEDURE — 94640 AIRWAY INHALATION TREATMENT: CPT

## 2022-12-04 PROCEDURE — 11000001 HC ACUTE MED/SURG PRIVATE ROOM

## 2022-12-04 PROCEDURE — 82705 FATS/LIPIDS FECES QUAL: CPT | Performed by: NURSE PRACTITIONER

## 2022-12-04 PROCEDURE — 27000207 HC ISOLATION

## 2022-12-04 PROCEDURE — 94761 N-INVAS EAR/PLS OXIMETRY MLT: CPT

## 2022-12-04 PROCEDURE — 87329 GIARDIA AG IA: CPT | Performed by: NURSE PRACTITIONER

## 2022-12-04 PROCEDURE — 87427 SHIGA-LIKE TOXIN AG IA: CPT | Mod: 59 | Performed by: NURSE PRACTITIONER

## 2022-12-04 PROCEDURE — 99900035 HC TECH TIME PER 15 MIN (STAT)

## 2022-12-04 PROCEDURE — 87046 STOOL CULTR AEROBIC BACT EA: CPT | Performed by: NURSE PRACTITIONER

## 2022-12-04 PROCEDURE — 83993 ASSAY FOR CALPROTECTIN FECAL: CPT | Performed by: NURSE PRACTITIONER

## 2022-12-04 PROCEDURE — 89055 LEUKOCYTE ASSESSMENT FECAL: CPT | Performed by: NURSE PRACTITIONER

## 2022-12-04 PROCEDURE — 87209 SMEAR COMPLEX STAIN: CPT | Performed by: NURSE PRACTITIONER

## 2022-12-04 PROCEDURE — 25000003 PHARM REV CODE 250: Performed by: NURSE PRACTITIONER

## 2022-12-04 PROCEDURE — 63600175 PHARM REV CODE 636 W HCPCS: Performed by: INTERNAL MEDICINE

## 2022-12-04 PROCEDURE — 80048 BASIC METABOLIC PNL TOTAL CA: CPT | Performed by: INTERNAL MEDICINE

## 2022-12-04 PROCEDURE — 87425 ROTAVIRUS AG IA: CPT | Performed by: NURSE PRACTITIONER

## 2022-12-04 PROCEDURE — 93005 ELECTROCARDIOGRAM TRACING: CPT

## 2022-12-04 PROCEDURE — 93010 ELECTROCARDIOGRAM REPORT: CPT | Mod: ,,, | Performed by: INTERNAL MEDICINE

## 2022-12-04 PROCEDURE — 82653 EL-1 FECAL QUANTITATIVE: CPT | Performed by: NURSE PRACTITIONER

## 2022-12-04 PROCEDURE — 96360 HYDRATION IV INFUSION INIT: CPT

## 2022-12-04 PROCEDURE — 25000242 PHARM REV CODE 250 ALT 637 W/ HCPCS: Performed by: INTERNAL MEDICINE

## 2022-12-04 PROCEDURE — 87324 CLOSTRIDIUM AG IA: CPT | Performed by: NURSE PRACTITIONER

## 2022-12-04 PROCEDURE — 93010 EKG 12-LEAD: ICD-10-PCS | Mod: ,,, | Performed by: INTERNAL MEDICINE

## 2022-12-04 PROCEDURE — 25000003 PHARM REV CODE 250: Performed by: EMERGENCY MEDICINE

## 2022-12-04 PROCEDURE — 27000221 HC OXYGEN, UP TO 24 HOURS

## 2022-12-04 PROCEDURE — 87338 HPYLORI STOOL AG IA: CPT | Performed by: NURSE PRACTITIONER

## 2022-12-04 PROCEDURE — 12000002 HC ACUTE/MED SURGE SEMI-PRIVATE ROOM

## 2022-12-04 RX ORDER — LOPERAMIDE HYDROCHLORIDE 2 MG/1
2 CAPSULE ORAL 3 TIMES DAILY PRN
Status: DISCONTINUED | OUTPATIENT
Start: 2022-12-04 | End: 2022-12-06 | Stop reason: HOSPADM

## 2022-12-04 RX ORDER — ACETAMINOPHEN 500 MG
1000 TABLET ORAL
Status: COMPLETED | OUTPATIENT
Start: 2022-12-04 | End: 2022-12-04

## 2022-12-04 RX ORDER — SODIUM CHLORIDE 0.9 % (FLUSH) 0.9 %
3 SYRINGE (ML) INJECTION
Status: DISCONTINUED | OUTPATIENT
Start: 2022-12-04 | End: 2022-12-06 | Stop reason: HOSPADM

## 2022-12-04 RX ORDER — LOSARTAN POTASSIUM 50 MG/1
100 TABLET ORAL DAILY
Status: DISCONTINUED | OUTPATIENT
Start: 2022-12-04 | End: 2022-12-06 | Stop reason: HOSPADM

## 2022-12-04 RX ORDER — ASPIRIN 81 MG/1
81 TABLET ORAL DAILY
Status: DISCONTINUED | OUTPATIENT
Start: 2022-12-04 | End: 2022-12-06 | Stop reason: HOSPADM

## 2022-12-04 RX ORDER — AMITRIPTYLINE HYDROCHLORIDE 25 MG/1
25 TABLET, FILM COATED ORAL DAILY
Status: DISCONTINUED | OUTPATIENT
Start: 2022-12-04 | End: 2022-12-06 | Stop reason: HOSPADM

## 2022-12-04 RX ORDER — AMLODIPINE BESYLATE 5 MG/1
5 TABLET ORAL DAILY
Status: DISCONTINUED | OUTPATIENT
Start: 2022-12-04 | End: 2022-12-06 | Stop reason: HOSPADM

## 2022-12-04 RX ORDER — ENOXAPARIN SODIUM 100 MG/ML
40 INJECTION SUBCUTANEOUS EVERY 24 HOURS
Status: DISCONTINUED | OUTPATIENT
Start: 2022-12-04 | End: 2022-12-06 | Stop reason: HOSPADM

## 2022-12-04 RX ORDER — OSELTAMIVIR PHOSPHATE 75 MG/1
75 CAPSULE ORAL DAILY
Status: DISCONTINUED | OUTPATIENT
Start: 2022-12-05 | End: 2022-12-04

## 2022-12-04 RX ORDER — PREDNISONE 20 MG/1
40 TABLET ORAL DAILY
Status: DISCONTINUED | OUTPATIENT
Start: 2022-12-05 | End: 2022-12-06 | Stop reason: HOSPADM

## 2022-12-04 RX ORDER — ATORVASTATIN CALCIUM 40 MG/1
80 TABLET, FILM COATED ORAL DAILY
Status: DISCONTINUED | OUTPATIENT
Start: 2022-12-04 | End: 2022-12-06 | Stop reason: HOSPADM

## 2022-12-04 RX ORDER — OSELTAMIVIR PHOSPHATE 75 MG/1
75 CAPSULE ORAL 2 TIMES DAILY
Status: DISCONTINUED | OUTPATIENT
Start: 2022-12-04 | End: 2022-12-06 | Stop reason: HOSPADM

## 2022-12-04 RX ORDER — IPRATROPIUM BROMIDE AND ALBUTEROL SULFATE 2.5; .5 MG/3ML; MG/3ML
3 SOLUTION RESPIRATORY (INHALATION)
Status: DISCONTINUED | OUTPATIENT
Start: 2022-12-04 | End: 2022-12-06 | Stop reason: HOSPADM

## 2022-12-04 RX ORDER — TALC
6 POWDER (GRAM) TOPICAL NIGHTLY PRN
Status: DISCONTINUED | OUTPATIENT
Start: 2022-12-04 | End: 2022-12-06 | Stop reason: HOSPADM

## 2022-12-04 RX ORDER — ONDANSETRON 8 MG/1
8 TABLET, ORALLY DISINTEGRATING ORAL EVERY 8 HOURS PRN
Status: DISCONTINUED | OUTPATIENT
Start: 2022-12-04 | End: 2022-12-06 | Stop reason: HOSPADM

## 2022-12-04 RX ORDER — OSELTAMIVIR PHOSPHATE 75 MG/1
75 CAPSULE ORAL
Status: COMPLETED | OUTPATIENT
Start: 2022-12-04 | End: 2022-12-04

## 2022-12-04 RX ORDER — PREDNISONE 20 MG/1
40 TABLET ORAL DAILY
Status: DISCONTINUED | OUTPATIENT
Start: 2022-12-04 | End: 2022-12-04

## 2022-12-04 RX ORDER — POTASSIUM CHLORIDE 20 MEQ/1
40 TABLET, EXTENDED RELEASE ORAL ONCE
Status: COMPLETED | OUTPATIENT
Start: 2022-12-04 | End: 2022-12-04

## 2022-12-04 RX ADMIN — LOSARTAN POTASSIUM 100 MG: 50 TABLET, FILM COATED ORAL at 08:12

## 2022-12-04 RX ADMIN — AMITRIPTYLINE HYDROCHLORIDE 25 MG: 25 TABLET, FILM COATED ORAL at 08:12

## 2022-12-04 RX ADMIN — POTASSIUM CHLORIDE 40 MEQ: 1500 TABLET, EXTENDED RELEASE ORAL at 12:12

## 2022-12-04 RX ADMIN — PREDNISONE 40 MG: 20 TABLET ORAL at 02:12

## 2022-12-04 RX ADMIN — IPRATROPIUM BROMIDE AND ALBUTEROL SULFATE 3 ML: .5; 3 SOLUTION RESPIRATORY (INHALATION) at 01:12

## 2022-12-04 RX ADMIN — IPRATROPIUM BROMIDE AND ALBUTEROL SULFATE 3 ML: .5; 3 SOLUTION RESPIRATORY (INHALATION) at 08:12

## 2022-12-04 RX ADMIN — ENOXAPARIN SODIUM 40 MG: 40 INJECTION SUBCUTANEOUS at 05:12

## 2022-12-04 RX ADMIN — IPRATROPIUM BROMIDE AND ALBUTEROL SULFATE 3 ML: .5; 3 SOLUTION RESPIRATORY (INHALATION) at 07:12

## 2022-12-04 RX ADMIN — ATORVASTATIN CALCIUM 80 MG: 40 TABLET, FILM COATED ORAL at 08:12

## 2022-12-04 RX ADMIN — ACETAMINOPHEN 1000 MG: 500 TABLET ORAL at 02:12

## 2022-12-04 RX ADMIN — OSELTAMIVIR PHOSPHATE 75 MG: 75 CAPSULE ORAL at 02:12

## 2022-12-04 RX ADMIN — LOPERAMIDE HYDROCHLORIDE 2 MG: 2 CAPSULE ORAL at 11:12

## 2022-12-04 RX ADMIN — OSELTAMIVIR PHOSPHATE 75 MG: 75 CAPSULE ORAL at 08:12

## 2022-12-04 RX ADMIN — ASPIRIN 81 MG: 81 TABLET, COATED ORAL at 08:12

## 2022-12-04 RX ADMIN — SODIUM CHLORIDE 500 ML: 0.9 INJECTION, SOLUTION INTRAVENOUS at 12:12

## 2022-12-04 RX ADMIN — AMLODIPINE BESYLATE 5 MG: 5 TABLET ORAL at 12:12

## 2022-12-04 NOTE — SUBJECTIVE & OBJECTIVE
Past Medical History:   Diagnosis Date    Benign neoplasm of colon 10/1/2013    Bronchitis chronic     CAD (coronary artery disease) 10/31/2013    COPD (chronic obstructive pulmonary disease)     Emphysema of lung     GERD (gastroesophageal reflux disease)     Hx of colonic polyps     Hypertension     NAFLD (nonalcoholic fatty liver disease) 3/27/2017    SHOLA on CPAP     RLS (restless legs syndrome)     Screen for colon cancer 8/30/2013       Past Surgical History:   Procedure Laterality Date    bilateral foot surgery  1993    CARDIAC CATHETERIZATION  2013    x1    CATARACT EXTRACTION, BILATERAL      COLON SURGERY  2013    Ace Mott MD    COLONOSCOPY N/A 3/21/2018    Procedure: COLONOSCOPY;  Surgeon: Terry Mott MD;  Location: [facility] ENDO (4TH FLR);  Service: Endoscopy;  Laterality: N/A;    COLONOSCOPY N/A 4/12/2019    Procedure: COLONOSCOPY;  Surgeon: John Mantilla MD;  Location: [facility] ENDO (4TH FLR);  Service: Endoscopy;  Laterality: N/A;    COLONOSCOPY N/A 5/31/2022    Procedure: COLONOSCOPY;  Surgeon: MEDINA Farris MD;  [facility]  Service: Endoscopy;  Laterality: N/A;  fully vacc-inst portal-tb    scrotal abscess removal         Review of patient's allergies indicates:   Allergen Reactions    Brilinta [ticagrelor] Itching    Lisinopril      Other reaction(s): cough    Metoprolol succinate Rash       No current facility-administered medications on file prior to encounter.     Current Outpatient Medications on File Prior to Encounter   Medication Sig    albuterol (PROVENTIL/VENTOLIN HFA) 90 mcg/actuation inhaler Inhale 2 puffs into the lungs every 4 (four) hours as needed for Wheezing or Shortness of Breath.    albuterol-ipratropium (DUO-NEB) 2.5 mg-0.5 mg/3 mL nebulizer solution INHALE 1 VIAL VIA NEBULIZER EVERY 6 HOURS AS NEEDEDILF    amitriptyline (ELAVIL) 25 MG tablet TAKE 1 TABLET BY MOUTH EVERY DAY    amLODIPine (NORVASC) 5 MG tablet Take 1 tablet (5 mg total) by mouth once  daily.    aspirin (ECOTRIN) 81 MG EC tablet Take 81 mg by mouth once daily.     atorvastatin (LIPITOR) 80 MG tablet TAKE 1 TABLET (80 MG TOTAL) BY MOUTH ONCE DAILY.    azelastine (ASTELIN) 137 mcg (0.1 %) nasal spray 1 spray (137 mcg total) by Nasal route 2 (two) times daily. (Patient not taking: Reported on 12/1/2022)    benzonatate (TESSALON PERLES) 100 MG capsule Take 1 capsule (100 mg total) by mouth every 6 (six) hours as needed for Cough.    BETA-CAROTENE,A, W-C & E/MIN (OCUVITE ORAL) Take 1 capsule by mouth once daily.     budesonide-formoterol 160-4.5 mcg (SYMBICORT) 160-4.5 mcg/actuation HFAA 2 puffs bid generic please    echinacea 400 mg Cap Take 1 capsule by mouth once daily.     flu vac 2022 [id number]-[id number],PF, (FLUAD QUAD 2022-23,65Y UP,,PF,) 60 mcg (15 mcg x 4)/0.5 mL Syrg Inject into the muscle.    fluticasone propionate (FLONASE) 50 mcg/actuation nasal spray SPRAY 1 SPRAY INTO EACH NOSTRIL EVERY DAY    fluticasone propionate (FLONASE) 50 mcg/actuation nasal spray 1 spray (50 mcg total) by Each Nostril route once daily.    hydroCHLOROthiazide (HYDRODIURIL) 25 MG tablet Take 0.5 tablets (12.5 mg total) by mouth once daily.    latanoprost 0.005 % ophthalmic solution Place 1 drop into both eyes once daily.     losartan (COZAAR) 100 MG tablet TAKE 1 TABLET BY MOUTH EVERY DAY    nitroGLYCERIN (NITROSTAT) 0.4 MG SL tablet Place 1 tablet (0.4 mg total) under the tongue every 5 (five) minutes as needed.    omeprazole (PRILOSEC) 20 MG capsule TAKE 1 CAPSULE BY MOUTH EVERY DAY    predniSONE (DELTASONE) 5 MG tablet TAKE 1 TABLET BY MOUTH EVERY OTHER DAY. TAKE WITH FOOD.    promethazine-dextromethorphan (PROMETHAZINE-DM) 6.25-15 mg/5 mL Syrp Take 5 mLs by mouth nightly as needed (cough).    roflumilast (DALIRESP) 500 mcg Tab Take 1 tablet (500 mcg total) by mouth once daily.    SPIRIVA WITH HANDIHALER 18 mcg inhalation capsule INHALE 1 CAPSULE WITH INHALATION DEVICE ONCE DAILY     Family History       Problem  Relation (Age of Onset)    Heart attack Mother    Heart disease Mother    Hypertension Mother          Tobacco Use    Smoking status: Former     Packs/day: 1.00     Years: 40.00     Pack years: 40.00     Types: Cigarettes     Quit date: 8/15/2012     Years since quitting: 10.3     Passive exposure: Never    Smokeless tobacco: Never   Substance and Sexual Activity    Alcohol use: Yes     Comment: social use only    Drug use: No    Sexual activity: Yes     Partners: Female     Review of Systems   All other systems reviewed and are negative.  Objective:     Vital Signs (Most Recent):  Temp: 98.2 °F (36.8 °C) (12/03/22 2312)  Pulse: 106 (12/03/22 2359)  Resp: 12 (12/03/22 2359)  BP: (!) 139/91 (12/03/22 2312)  SpO2: 97 % (12/03/22 2359)   Vital Signs (24h Range):  Temp:  [98.2 °F (36.8 °C)] 98.2 °F (36.8 °C)  Pulse:  [106-120] 106  Resp:  [12-22] 12  SpO2:  [92 %-97 %] 97 %  BP: (139)/(91) 139/91     Weight: 108.9 kg (240 lb)  Body mass index is 34.44 kg/m².    Physical Exam  Vitals and nursing note reviewed.   Constitutional:       Appearance: He is normal weight. He is ill-appearing.   HENT:      Head: Normocephalic and atraumatic.      Mouth/Throat:      Mouth: Mucous membranes are dry.   Eyes:      Extraocular Movements: Extraocular movements intact.      Pupils: Pupils are equal, round, and reactive to light.   Cardiovascular:      Rate and Rhythm: Regular rhythm. Tachycardia present.      Pulses: Normal pulses.      Heart sounds: Normal heart sounds.   Pulmonary:      Effort: Respiratory distress present.      Breath sounds: Wheezing present. No rales.   Abdominal:      General: Abdomen is flat. Bowel sounds are normal. There is no distension.      Palpations: Abdomen is soft.      Tenderness: There is no abdominal tenderness.   Skin:     General: Skin is warm.      Capillary Refill: Capillary refill takes less than 2 seconds.   Neurological:      General: No focal deficit present.      Mental Status: He is  oriented to person, place, and time. Mental status is at baseline.         CRANIAL NERVES     CN III, IV, VI   Pupils are equal, round, and reactive to light.     Significant Labs: All pertinent labs within the past 24 hours have been reviewed.    Significant Imaging: I have reviewed all pertinent imaging results/findings within the past 24 hours.

## 2022-12-04 NOTE — HPI
78 yo male with a PMHx of COPD, CAD, HTN, GERD, and SHOLA here for SOB and cough.    Patient developed symptoms about 3-4 days ago. Started having worsening SOB at rest with wheezing. Initially flu and covid negative at an urgent care. Cough medicine and inhalers were not very effective. Due to worsening SOB today, he called EMS. EMS stated patientr was hypoxic and visibly SOB. He was given solumedrol on route to the ED. Denied significant cough production, CP, palpitations, dizziness, syncope.    In the ED, patient requiring 4L NC for O2 sat 91% but desaturations immediately upon exertion. Labs showing Flu A positive with CXR without signs of pneumonia. Given duonebs, tamiflu, and admitted to medicine.

## 2022-12-04 NOTE — H&P
[facility] Medicine  History & Physical    Patient Name: Jordin Allen Jr.  MRN: [id number]  Patient Class: Emergency  Admission Date: 12/3/2022  Attending Physician: Mannie Qiu MD   Primary Care Provider: APRIL Mccain MD         Patient information was obtained from patient, past medical records and ER records.     Subjective:     Principal Problem:Acute respiratory failure with hypoxia    Chief Complaint:   Chief Complaint   Patient presents with    Shortness of Breath     Patient brought in by EMS for complaints of cough, congestion and SOB. Was seen at  on Thursday and tested neg for Flu and Covid. Prescribed medications not improving symptoms. Patient has history of COPD. Duo neb given en route with 125 solumedrol. Inspiratory wheezing heard. EMS states patient originally had expiratory wheezing as well.         HPI: 78 yo male with a PMHx of COPD, CAD, HTN, GERD, and SHOLA here for SOB and cough.    Patient developed symptoms about 3-4 days ago. Started having worsening SOB at rest with wheezing. Initially flu and covid negative at an urgent care. Cough medicine and inhalers were not very effective. Due to worsening SOB today, he called EMS. EMS stated patientr was hypoxic and visibly SOB. He was given solumedrol on route to the ED. Denied significant cough production, CP, palpitations, dizziness, syncope.    In the ED, patient requiring 4L NC for O2 sat 91% but desaturations immediately upon exertion. Labs showing Flu A positive with CXR without signs of pneumonia. Given duonebs, tamiflu, and admitted to medicine.      Past Medical History:   Diagnosis Date    Benign neoplasm of colon 10/1/2013    Bronchitis chronic     CAD (coronary artery disease) 10/31/2013    COPD (chronic obstructive pulmonary disease)     Emphysema of lung     GERD (gastroesophageal reflux disease)     Hx of colonic polyps     Hypertension     NAFLD (nonalcoholic fatty liver disease)  3/27/2017    SHOLA on CPAP     RLS (restless legs syndrome)     Screen for colon cancer 8/30/2013       Past Surgical History:   Procedure Laterality Date    bilateral foot surgery  1993    CARDIAC CATHETERIZATION  2013    x1    CATARACT EXTRACTION, BILATERAL      COLON SURGERY  2013    Ace Mott MD    COLONOSCOPY N/A 3/21/2018    Procedure: COLONOSCOPY;  Surgeon: Terry Mott MD;  Location: [facility] ENDO (4TH FLR);  Service: Endoscopy;  Laterality: N/A;    COLONOSCOPY N/A 4/12/2019    Procedure: COLONOSCOPY;  Surgeon: John Mantilla MD;  Location: [facility] ENDO (4TH FLR);  Service: Endoscopy;  Laterality: N/A;    COLONOSCOPY N/A 5/31/2022    Procedure: COLONOSCOPY;  Surgeon: MEDINA Farris MD;  Location: [facility] ENDO (4TH FLR);  Service: Endoscopy;  Laterality: N/A;  fully vacc-inst portal-tb    scrotal abscess removal         Review of patient's allergies indicates:   Allergen Reactions    Brilinta [ticagrelor] Itching    Lisinopril      Other reaction(s): cough    Metoprolol succinate Rash       No current facility-administered medications on file prior to encounter.     Current Outpatient Medications on File Prior to Encounter   Medication Sig    albuterol (PROVENTIL/VENTOLIN HFA) 90 mcg/actuation inhaler Inhale 2 puffs into the lungs every 4 (four) hours as needed for Wheezing or Shortness of Breath.    albuterol-ipratropium (DUO-NEB) 2.5 mg-0.5 mg/3 mL nebulizer solution INHALE 1 VIAL VIA NEBULIZER EVERY 6 HOURS AS NEEDEDILF    amitriptyline (ELAVIL) 25 MG tablet TAKE 1 TABLET BY MOUTH EVERY DAY    amLODIPine (NORVASC) 5 MG tablet Take 1 tablet (5 mg total) by mouth once daily.    aspirin (ECOTRIN) 81 MG EC tablet Take 81 mg by mouth once daily.     atorvastatin (LIPITOR) 80 MG tablet TAKE 1 TABLET (80 MG TOTAL) BY MOUTH ONCE DAILY.    azelastine (ASTELIN) 137 mcg (0.1 %) nasal spray 1 spray (137 mcg total) by Nasal route 2 (two) times daily. (Patient not taking: Reported on 12/1/2022)     benzonatate (TESSALON PERLES) 100 MG capsule Take 1 capsule (100 mg total) by mouth every 6 (six) hours as needed for Cough.    BETA-CAROTENE,A, W-C & E/MIN (OCUVITE ORAL) Take 1 capsule by mouth once daily.     budesonide-formoterol 160-4.5 mcg (SYMBICORT) 160-4.5 mcg/actuation HFAA 2 puffs bid generic please    echinacea 400 mg Cap Take 1 capsule by mouth once daily.     flu vac 2022 [id number],PF, (FLUAD QUAD 2022-23,65Y UP,,PF,) 60 mcg (15 mcg x 4)/0.5 mL Syrg Inject into the muscle.    fluticasone propionate (FLONASE) 50 mcg/actuation nasal spray SPRAY 1 SPRAY INTO EACH NOSTRIL EVERY DAY    fluticasone propionate (FLONASE) 50 mcg/actuation nasal spray 1 spray (50 mcg total) by Each Nostril route once daily.    hydroCHLOROthiazide (HYDRODIURIL) 25 MG tablet Take 0.5 tablets (12.5 mg total) by mouth once daily.    latanoprost 0.005 % ophthalmic solution Place 1 drop into both eyes once daily.     losartan (COZAAR) 100 MG tablet TAKE 1 TABLET BY MOUTH EVERY DAY    nitroGLYCERIN (NITROSTAT) 0.4 MG SL tablet Place 1 tablet (0.4 mg total) under the tongue every 5 (five) minutes as needed.    omeprazole (PRILOSEC) 20 MG capsule TAKE 1 CAPSULE BY MOUTH EVERY DAY    predniSONE (DELTASONE) 5 MG tablet TAKE 1 TABLET BY MOUTH EVERY OTHER DAY. TAKE WITH FOOD.    promethazine-dextromethorphan (PROMETHAZINE-DM) 6.25-15 mg/5 mL Syrp Take 5 mLs by mouth nightly as needed (cough).    roflumilast (DALIRESP) 500 mcg Tab Take 1 tablet (500 mcg total) by mouth once daily.    SPIRIVA WITH HANDIHALER 18 mcg inhalation capsule INHALE 1 CAPSULE WITH INHALATION DEVICE ONCE DAILY     Family History       Problem Relation (Age of Onset)    Heart attack Mother    Heart disease Mother    Hypertension Mother          Tobacco Use    Smoking status: Former     Packs/day: 1.00     Years: 40.00     Pack years: 40.00     Types: Cigarettes     Quit date: 8/15/2012     Years since quitting: 10.3     Passive exposure: Never     Smokeless tobacco: Never   Substance and Sexual Activity    Alcohol use: Yes     Comment: social use only    Drug use: No    Sexual activity: Yes     Partners: Female     Review of Systems   All other systems reviewed and are negative.  Objective:     Vital Signs (Most Recent):  Temp: 98.2 °F (36.8 °C) (12/03/22 2312)  Pulse: 106 (12/03/22 2359)  Resp: 12 (12/03/22 2359)  BP: (!) 139/91 (12/03/22 2312)  SpO2: 97 % (12/03/22 2359)   Vital Signs (24h Range):  Temp:  [98.2 °F (36.8 °C)] 98.2 °F (36.8 °C)  Pulse:  [106-120] 106  Resp:  [12-22] 12  SpO2:  [92 %-97 %] 97 %  BP: (139)/(91) 139/91     Weight: 108.9 kg (240 lb)  Body mass index is 34.44 kg/m².    Physical Exam  Vitals and nursing note reviewed.   Constitutional:       Appearance: He is normal weight. He is ill-appearing.   HENT:      Head: Normocephalic and atraumatic.      Mouth/Throat:      Mouth: Mucous membranes are dry.   Eyes:      Extraocular Movements: Extraocular movements intact.      Pupils: Pupils are equal, round, and reactive to light.   Cardiovascular:      Rate and Rhythm: Regular rhythm. Tachycardia present.      Pulses: Normal pulses.      Heart sounds: Normal heart sounds.   Pulmonary:      Effort: Respiratory distress present.      Breath sounds: Wheezing present. No rales.   Abdominal:      General: Abdomen is flat. Bowel sounds are normal. There is no distension.      Palpations: Abdomen is soft.      Tenderness: There is no abdominal tenderness.   Skin:     General: Skin is warm.      Capillary Refill: Capillary refill takes less than 2 seconds.   Neurological:      General: No focal deficit present.      Mental Status: He is oriented to person, place, and time. Mental status is at baseline.         CRANIAL NERVES     CN III, IV, VI   Pupils are equal, round, and reactive to light.     Significant Labs: All pertinent labs within the past 24 hours have been reviewed.    Significant Imaging: I have reviewed all pertinent imaging  results/findings within the past 24 hours.    Assessment/Plan:     * Acute respiratory failure with hypoxia  Patient with Hypoxic Respiratory failure which is Acute.  he is not on home oxygen. Supplemental oxygen was provided and noted-  .   Signs/symptoms of respiratory failure include- tachypnea, increased work of breathing, respiratory distress and wheezing. Contributing diagnoses includes - COPD and influenza Labs and images were reviewed. Patient Has not had a recent ABG. Will treat underlying causes and adjust management of respiratory failure as follows-     - Duonebs q6h  - Tamiflu 75mg BID  - Prednisone 40mg daily  - Continuous pulse oximetry    Influenza  - Flu A positive with hypoxia and underlying COPD  - Treat COPD exacerbation as below  - Start Tamiflu 75mg BID  - Monitor  - PRN cough medicine      Chronic obstructive pulmonary disease  - COPD exacerbation due to flu  - S/p solu-medrol in EMS  - Start prednisone 40mg daily  - Duonebs q6h  - CXR without pneumonia  - Continue 4L NC for hypoxia      CAD (coronary artery disease)  - ASA and statin      HTN (hypertension), benign  - Continue amlodipine and Losartan      GERD (gastroesophageal reflux disease)  - Monitor  - PRN medications        VTE Risk Mitigation (From admission, onward)         Ordered     enoxaparin injection 40 mg  Daily         12/04/22 0158     IP VTE HIGH RISK PATIENT  Once         12/04/22 0158                   Sonny Wheeler MD  Department of Hospital Medicine   [facility] - Emergency Dept

## 2022-12-04 NOTE — CARE UPDATE
He was admitted to the medicine service for care.  Noted with acute respiratory failure with hypoxia.  Patient is flu positive.  He was started on DuoNebs Tamiflu, and he was admitted to the hospital medicine department currently on 4 L of O2.  Will monitor.      Sissy Krishnan NP

## 2022-12-04 NOTE — PHARMACY MED REC
"      [facility]           Pharmacy  Admission Medication History     The home medication history was taken by Megan Torres.      Medication history obtained from Medications listed below were obtained from: Medical records UNABLE TO ASSESS PATIENT    Based on information gathered for medication list, you may go to "Admission" then "Reconcile Home Medications" tabs to review and/or act upon those items.     The home medication list has been updated by the Pharmacy department.   Please read ALL comments highlighted in yellow.   Please address this information as you see fit.    Feel free to contact us if you have any questions or require assistance.        No current facility-administered medications on file prior to encounter.     Current Outpatient Medications on File Prior to Encounter   Medication Sig Dispense Refill    amitriptyline (ELAVIL) 25 MG tablet TAKE 1 TABLET BY MOUTH EVERY DAY (Patient taking differently: Take 25 mg by mouth once daily.) 90 tablet 2    atorvastatin (LIPITOR) 80 MG tablet TAKE 1 TABLET (80 MG TOTAL) BY MOUTH ONCE DAILY. (Patient taking differently: Take 80 mg by mouth once daily. TAKE 1 TABLET (80 MG TOTAL) BY MOUTH ONCE DAILY.) 90 tablet 3    benzonatate (TESSALON PERLES) 100 MG capsule Take 1 capsule (100 mg total) by mouth every 6 (six) hours as needed for Cough. 30 capsule 1    budesonide-formoterol 160-4.5 mcg (SYMBICORT) 160-4.5 mcg/actuation HFAA 2 puffs bid generic please (Patient taking differently: Inhale 2 puffs into the lungs every 12 (twelve) hours. 2 puffs bid generic please) 30.6 g 3    fluticasone propionate (FLONASE) 50 mcg/actuation nasal spray 1 spray (50 mcg total) by Each Nostril route once daily. 9.9 mL 0    hydroCHLOROthiazide (HYDRODIURIL) 25 MG tablet Take 0.5 tablets (12.5 mg total) by mouth once daily. 30 tablet 6    latanoprost 0.005 % ophthalmic solution Place 1 drop into both eyes once daily.       losartan (COZAAR) 100 MG tablet " TAKE 1 TABLET BY MOUTH EVERY DAY (Patient taking differently: Take 100 mg by mouth once daily.) 90 tablet 2    omeprazole (PRILOSEC) 20 MG capsule TAKE 1 CAPSULE BY MOUTH EVERY DAY (Patient taking differently: Take 20 mg by mouth once daily.) 90 capsule 3    predniSONE (DELTASONE) 5 MG tablet TAKE 1 TABLET BY MOUTH EVERY OTHER DAY. TAKE WITH FOOD. (Patient taking differently: Take 5 mg by mouth every other day. TAKE WITH FOOD.) 30 tablet 6    promethazine-dextromethorphan (PROMETHAZINE-DM) 6.25-15 mg/5 mL Syrp Take 5 mLs by mouth nightly as needed (cough). 118 mL 0    SPIRIVA WITH HANDIHALER 18 mcg inhalation capsule INHALE 1 CAPSULE WITH INHALATION DEVICE ONCE DAILY (Patient taking differently: Inhale 18 mcg into the lungs once daily.) 30 capsule 11    albuterol (PROVENTIL/VENTOLIN HFA) 90 mcg/actuation inhaler Inhale 2 puffs into the lungs every 4 (four) hours as needed for Wheezing or Shortness of Breath. (Patient not taking: Reported on 12/4/2022) 24 g 3    albuterol-ipratropium (DUO-NEB) 2.5 mg-0.5 mg/3 mL nebulizer solution INHALE 1 VIAL VIA NEBULIZER EVERY 6 HOURS AS NEEDEDILF (Patient not taking: Reported on 12/4/2022) 90 mL 11    amLODIPine (NORVASC) 5 MG tablet Take 1 tablet (5 mg total) by mouth once daily. 90 tablet 3    aspirin (ECOTRIN) 81 MG EC tablet Take 81 mg by mouth once daily.       azelastine (ASTELIN) 137 mcg (0.1 %) nasal spray 1 spray (137 mcg total) by Nasal route 2 (two) times daily. (Patient not taking: Reported on 12/1/2022) 30 mL 3    BETA-CAROTENE,A, W-C & E/MIN (OCUVITE ORAL) Take 1 capsule by mouth once daily.       echinacea 400 mg Cap Take 1 capsule by mouth once daily.       nitroGLYCERIN (NITROSTAT) 0.4 MG SL tablet Place 1 tablet (0.4 mg total) under the tongue every 5 (five) minutes as needed. 100 tablet 3    roflumilast (DALIRESP) 500 mcg Tab Take 1 tablet (500 mcg total) by mouth once daily. 90 tablet 3       Please address this information as you see fit.  Feel free to  contact us if you have any questions or require assistance.    Megan Torres  [phone]            .

## 2022-12-04 NOTE — ED PROVIDER NOTES
Encounter Date: 12/3/2022    SCRIBE #1 NOTE: I, Grzegorz Hooker, am scribing for, and in the presence of,  Mannie Qiu.     History     Chief Complaint   Patient presents with    Shortness of Breath     Patient brought in by EMS for complaints of cough, congestion and SOB. Was seen at  on Thursday and tested neg for Flu and Covid. Prescribed medications not improving symptoms. Patient has history of COPD. Duo neb given en route with 125 solumedrol. Inspiratory wheezing heard. EMS states patient originally had expiratory wheezing as well.      Jordin Allen Jr. is a 77 y.o. male who  has a past medical history of Benign neoplasm of colon (10/1/2013), Bronchitis chronic, CAD (coronary artery disease) (10/31/2013), COPD (chronic obstructive pulmonary disease), Emphysema of lung, GERD (gastroesophageal reflux disease), colonic polyps, Hypertension, NAFLD (nonalcoholic fatty liver disease) (3/27/2017), SHOLA on CPAP, RLS (restless legs syndrome), and Screen for colon cancer (8/30/2013).    The patient presents to the ED due to shortness of breath.   Patient reports symptoms started 3 days ago have been worsening since. He tested negative for flu and COVID at urgent car 3 days ago. Cough medicine and tessalon perles without relief for worsening difficulty breathing. CPAP at home with some improvement. Notes his head feels like a balloon. Patient was given Solu-Medrol x1 as well as duo neb via EMS and still short of breath on arrival.    The history is provided by the patient.   Review of patient's allergies indicates:   Allergen Reactions    Brilinta [ticagrelor] Itching    Lisinopril      Other reaction(s): cough    Metoprolol succinate Rash     Past Medical History:   Diagnosis Date    Benign neoplasm of colon 10/1/2013    Bronchitis chronic     CAD (coronary artery disease) 10/31/2013    COPD (chronic obstructive pulmonary disease)     Emphysema of lung     GERD (gastroesophageal reflux disease)     Hx of colonic  polyps     Hypertension     NAFLD (nonalcoholic fatty liver disease) 3/27/2017    SHOLA on CPAP     RLS (restless legs syndrome)     Screen for colon cancer 8/30/2013     Past Surgical History:   Procedure Laterality Date    bilateral foot surgery  1993    CARDIAC CATHETERIZATION  2013    x1    CATARACT EXTRACTION, BILATERAL      COLON SURGERY  2013    Ace Mott MD    COLONOSCOPY N/A 3/21/2018    Procedure: COLONOSCOPY;  Surgeon: Terry Mott MD;  [facility]  Service: Endoscopy;  Laterality: N/A;    COLONOSCOPY N/A 4/12/2019    Procedure: COLONOSCOPY;  Surgeon: John Mantilla MD;  Location: [facility] ENDO ([facility] FLR);  Service: Endoscopy;  Laterality: N/A;    COLONOSCOPY N/A 5/31/2022    Procedure: COLONOSCOPY;  Surgeon: MEDINA Farris MD;  [facility]  Service: Endoscopy;  Laterality: N/A;  fully vacc-inst portal-tb    scrotal abscess removal       Family History   Problem Relation Age of Onset    Heart disease Mother     Heart attack Mother     Hypertension Mother     Asthma Neg Hx     Emphysema Neg Hx     Prostate cancer Neg Hx     Cirrhosis Neg Hx      Social History     Tobacco Use    Smoking status: Former     Packs/day: 1.00     Years: 40.00     Pack years: 40.00     Types: Cigarettes     Quit date: 8/15/2012     Years since quitting: 10.3     Passive exposure: Never    Smokeless tobacco: Never   Substance Use Topics    Alcohol use: Yes     Comment: social use only    Drug use: No     Review of Systems   Constitutional:  Positive for fever.   Respiratory:  Positive for cough and shortness of breath.    Cardiovascular:  Negative for chest pain.   Neurological:  Positive for headaches.   All other systems reviewed and are negative.    Physical Exam     Initial Vitals [12/03/22 2312]   BP Pulse Resp Temp SpO2   (!) 139/91 (!) 120 (!) 22 98.2 °F (36.8 °C) (!) 93 %      MAP       --         Physical Exam    Nursing note and vitals reviewed.  Constitutional: He appears  well-developed and well-nourished. He is not diaphoretic. No distress.   HENT:   Head: Normocephalic and atraumatic.   Eyes: Conjunctivae and EOM are normal. No scleral icterus.   Neck: Neck supple.   Normal range of motion.  Cardiovascular:  Normal heart sounds.           Tachycardic rate and rhythm.   Pulmonary/Chest:   There is diffuse bilateral wheezing.   Abdominal: Abdomen is soft. There is no abdominal tenderness.   Musculoskeletal:         General: No tenderness or edema. Normal range of motion.      Cervical back: Normal range of motion and neck supple.     Neurological: He is alert and oriented to person, place, and time.   Skin: Skin is warm and dry.       ED Course   Procedures  Labs Reviewed   CBC W/ AUTO DIFFERENTIAL - Abnormal; Notable for the following components:       Result Value    MCV 80 (*)     MCH 26.0 (*)     RDW 14.9 (*)     Gran # (ANC) 8.9 (*)     Lymph # 0.7 (*)     Mono # 1.2 (*)     Gran % 82.6 (*)     Lymph % 6.2 (*)     All other components within normal limits   COMPREHENSIVE METABOLIC PANEL - Abnormal; Notable for the following components:    Potassium 3.4 (*)     Glucose 148 (*)     Total Bilirubin 1.3 (*)     Anion Gap 17 (*)     All other components within normal limits   C-REACTIVE PROTEIN - Abnormal; Notable for the following components:    CRP 73.9 (*)     All other components within normal limits   BASIC METABOLIC PANEL - Abnormal; Notable for the following components:    Sodium 135 (*)     Potassium 3.1 (*)     Glucose 241 (*)     All other components within normal limits   POCT INFLUENZA A/B MOLECULAR - Abnormal; Notable for the following components:    POC Molecular Influenza A Ag Positive (*)     All other components within normal limits   CLOSTRIDIUM DIFFICILE   CULTURE, STOOL   ENTEROHEMORRHAGIC E.COLI   B-TYPE NATRIURETIC PEPTIDE   PROCALCITONIN   TROPONIN I   MAGNESIUM   H. PYLORI ANTIGEN, STOOL   OCCULT BLOOD X 1, STOOL   WBC, STOOL   PANCREATIC ELASTASE, FECAL    FECAL FAT, QUALITATIVE   ROTAVIRUS ANTIGEN, STOOL   CALPROTECTIN, STOOL   GIARDIA/CRYPTOSPORIDIUM (EIA)   STOOL EXAM-OVA,CYSTS,PARASITES   SARS-COV-2 RDRP GENE          Imaging Results              X-Ray Chest 1 View (Final result)  Result time 12/04/22 00:38:57      Final result by Popeye Mckeon DO (12/04/22 00:38:57)                   Impression:      No acute abnormality.      Electronically signed by: Popeye Mckeon  Date:    12/04/2022  Time:    00:38               Narrative:    EXAMINATION:  XR CHEST 1 VIEW    CLINICAL HISTORY:  Shortness of breath    TECHNIQUE:  Single frontal view of the chest was performed.    COMPARISON:  01/14/2019.    FINDINGS:  The lungs are well expanded and clear. No focal opacities are seen. The pleural spaces are clear.  The left costophrenic angle is not entirely included in the field of view.    The cardiac silhouette is unremarkable.    The visualized osseous structures are unremarkable.  Metallic jewelry overlies the right upper lung and upper mediastinum.                                       Medications   amitriptyline tablet 25 mg (25 mg Oral Given 12/4/22 0838)   amLODIPine tablet 5 mg (5 mg Oral Given 12/4/22 1243)   aspirin EC tablet 81 mg (81 mg Oral Given 12/4/22 0835)   atorvastatin tablet 80 mg (80 mg Oral Given 12/4/22 0838)   losartan tablet 100 mg (100 mg Oral Given 12/4/22 0836)   sodium chloride 0.9% flush 3 mL (has no administration in time range)   albuterol-ipratropium 2.5 mg-0.5 mg/3 mL nebulizer solution 3 mL (3 mLs Nebulization Given 12/4/22 2016)   enoxaparin injection 40 mg (40 mg Subcutaneous Given 12/4/22 1703)   melatonin tablet 6 mg (has no administration in time range)   ondansetron disintegrating tablet 8 mg (has no administration in time range)   oseltamivir capsule 75 mg (has no administration in time range)   predniSONE tablet 40 mg (has no administration in time range)   dextromethorphan-guaiFENesin  mg per 12 hr tablet 1 tablet (has  no administration in time range)   albuterol-ipratropium 2.5 mg-0.5 mg/3 mL nebulizer solution 3 mL (3 mLs Nebulization Given 12/3/22 4281)   sodium chloride 0.9% bolus 500 mL (0 mLs Intravenous Stopped 12/4/22 0145)   oseltamivir capsule 75 mg (75 mg Oral Given 12/4/22 0202)   acetaminophen tablet 1,000 mg (1,000 mg Oral Given 12/4/22 0203)   potassium chloride SA CR tablet 40 mEq (40 mEq Oral Given 12/4/22 1242)     Medical Decision Making:   Initial Assessment:   77-year-old male, former smoker, history of hypertension, coronary artery disease, COPD presents the ER for evaluation proximally 3 days progressively worsening shortness of breath.  Patient believes he had flu or COVID, went to urgent Care, swabs were negative, since then progressively worsening shortness of breath.  Unable to breathe today, called EMS and brought patient to the ER.  Patient was given Solu-Medrol x1 as well as duo neb via EMS.  Arrived in the ER, still short of breath, supplemental oxygen with diffuse bilateral wheezing.  Differential includes viral syndrome, pneumonia, COPD exacerbation other cause.  Will plan blood work symptomatic support reassess likely plan admission.           ED Course as of 12/04/22 2030   Sun Dec 04, 2022   0113 Resting in bed no acute distress, patient still wheezing on supplemental oxygen.  Patient is flu positive.  Hypoxemic respiratory failure secondary to flu, will plan admission.  Patient accepted by [facility] medicine. [SE]      ED Course User Index  [SE] Mannie Qiu MD                   Clinical Impression:   Final diagnoses:  [R06.02] SOB (shortness of breath)  [R06.02] Shortness of breath  [J96.01] Acute hypoxemic respiratory failure (Primary)  [J10.1] Influenza A H1N1 infection        ED Disposition Condition    Admit                My Scribe Attestation: I acknowledge that the documentation on this chart was provided by described on the date of service noted above and that the documentation in  the chart accurately reflects work and decisions made by me alone.       Mannie Qiu MD  12/04/22 2030

## 2022-12-04 NOTE — ED NOTES
Pt. Arrives via ems with reports of worsening sob, cough (nonproductive) x3 days. Pt. Has COPD but is not on oxygen. He uses neb. Treatments at home and wears cpap at night (has not had a machine in over a year). He was given solu-medrol and duoneb en route with reported little relief. He has mild visible dyspnea at rest. Sao2 is low 90's on nasal cannula.

## 2022-12-04 NOTE — ED NOTES
Dr. Qiu at bedside for reassessment and to discuss test results and plan of care-admission. Pt. Updated on ED boarding status. Provided pillows, blankets, socks, p.o. fluids, urinal to bedside. Pt. Has increased  dyspnea with activity, sao2 drops to 88%; improves at rest.

## 2022-12-04 NOTE — ASSESSMENT & PLAN NOTE
Patient with Hypoxic Respiratory failure which is Acute.  he is not on home oxygen. Supplemental oxygen was provided and noted-  .   Signs/symptoms of respiratory failure include- tachypnea, increased work of breathing, respiratory distress and wheezing. Contributing diagnoses includes - COPD and influenza Labs and images were reviewed. Patient Has not had a recent ABG. Will treat underlying causes and adjust management of respiratory failure as follows-     - Duonebs q6h  - Tamiflu 75mg BID  - Prednisone 40mg daily  - Continuous pulse oximetry

## 2022-12-04 NOTE — ASSESSMENT & PLAN NOTE
- Flu A positive with hypoxia and underlying COPD  - Treat COPD exacerbation as below  - Start Tamiflu 75mg BID  - Monitor  - PRN cough medicine

## 2022-12-04 NOTE — ASSESSMENT & PLAN NOTE
- COPD exacerbation due to flu  - S/p solu-medrol in EMS  - Start prednisone 40mg daily  - Duonebs q6h  - CXR without pneumonia  - Continue 4L NC for hypoxia

## 2022-12-05 LAB
C DIFF GDH STL QL: NEGATIVE
C DIFF TOX A+B STL QL IA: NEGATIVE

## 2022-12-05 PROCEDURE — 25000003 PHARM REV CODE 250: Performed by: INTERNAL MEDICINE

## 2022-12-05 PROCEDURE — 11000001 HC ACUTE MED/SURG PRIVATE ROOM

## 2022-12-05 PROCEDURE — 27000207 HC ISOLATION

## 2022-12-05 PROCEDURE — 27000221 HC OXYGEN, UP TO 24 HOURS

## 2022-12-05 PROCEDURE — 94761 N-INVAS EAR/PLS OXIMETRY MLT: CPT

## 2022-12-05 PROCEDURE — 99900035 HC TECH TIME PER 15 MIN (STAT)

## 2022-12-05 PROCEDURE — 25000242 PHARM REV CODE 250 ALT 637 W/ HCPCS: Performed by: INTERNAL MEDICINE

## 2022-12-05 PROCEDURE — 63600175 PHARM REV CODE 636 W HCPCS: Performed by: INTERNAL MEDICINE

## 2022-12-05 PROCEDURE — 94640 AIRWAY INHALATION TREATMENT: CPT

## 2022-12-05 RX ADMIN — GUAIFENESIN AND DEXTROMETHORPHAN HYDROBROMIDE 1 TABLET: 600; 30 TABLET, EXTENDED RELEASE ORAL at 08:12

## 2022-12-05 RX ADMIN — ENOXAPARIN SODIUM 40 MG: 40 INJECTION SUBCUTANEOUS at 05:12

## 2022-12-05 RX ADMIN — AMITRIPTYLINE HYDROCHLORIDE 25 MG: 25 TABLET, FILM COATED ORAL at 09:12

## 2022-12-05 RX ADMIN — IPRATROPIUM BROMIDE AND ALBUTEROL SULFATE 3 ML: .5; 3 SOLUTION RESPIRATORY (INHALATION) at 07:12

## 2022-12-05 RX ADMIN — LOPERAMIDE HYDROCHLORIDE 2 MG: 2 CAPSULE ORAL at 08:12

## 2022-12-05 RX ADMIN — ATORVASTATIN CALCIUM 80 MG: 40 TABLET, FILM COATED ORAL at 09:12

## 2022-12-05 RX ADMIN — LOSARTAN POTASSIUM 100 MG: 50 TABLET, FILM COATED ORAL at 09:12

## 2022-12-05 RX ADMIN — PREDNISONE 40 MG: 20 TABLET ORAL at 09:12

## 2022-12-05 RX ADMIN — OSELTAMIVIR PHOSPHATE 75 MG: 75 CAPSULE ORAL at 08:12

## 2022-12-05 RX ADMIN — GUAIFENESIN AND DEXTROMETHORPHAN HYDROBROMIDE 1 TABLET: 600; 30 TABLET, EXTENDED RELEASE ORAL at 12:12

## 2022-12-05 RX ADMIN — AMLODIPINE BESYLATE 5 MG: 5 TABLET ORAL at 09:12

## 2022-12-05 RX ADMIN — IPRATROPIUM BROMIDE AND ALBUTEROL SULFATE 3 ML: .5; 3 SOLUTION RESPIRATORY (INHALATION) at 01:12

## 2022-12-05 RX ADMIN — ASPIRIN 81 MG: 81 TABLET, COATED ORAL at 09:12

## 2022-12-05 RX ADMIN — OSELTAMIVIR PHOSPHATE 75 MG: 75 CAPSULE ORAL at 09:12

## 2022-12-05 NOTE — PLAN OF CARE
Problem: Adjustment to Illness COPD (Chronic Obstructive Pulmonary Disease)  Goal: Optimal Chronic Illness Coping  Outcome: Ongoing, Progressing     Problem: Functional Ability Impaired COPD (Chronic Obstructive Pulmonary Disease)  Goal: Optimal Level of Functional [place]  Outcome: Ongoing, Progressing     Problem: Infection COPD (Chronic Obstructive Pulmonary Disease)  Goal: Absence of Infection Signs and Symptoms  Outcome: Ongoing, Progressing     Problem: Oral Intake Inadequate COPD (Chronic Obstructive Pulmonary Disease)  Goal: Improved Nutrition Intake  Outcome: Ongoing, Progressing     Problem: Respiratory Compromise COPD (Chronic Obstructive Pulmonary Disease)  Goal: Effective Oxygenation and Ventilation  Outcome: Ongoing, Progressing     Problem: Adult Inpatient Plan of Care  Goal: Plan of Care Review  Outcome: Ongoing, Progressing  Goal: Patient-Specific Goal (Individualized)  Outcome: Ongoing, Progressing  Goal: Absence of Hospital-Acquired Illness or Injury  Outcome: Ongoing, Progressing  Goal: Optimal Comfort and Wellbeing  Outcome: Ongoing, Progressing  Goal: Readiness for Transition of Care  Outcome: Ongoing, Progressing     Problem: Infection  Goal: Absence of Infection Signs and Symptoms  Outcome: Ongoing, Progressing     Problem: Diarrhea  Goal: Fluid and Electrolyte Balance  Outcome: Ongoing, Progressing     Problem: COPD (Chronic Obstructive Pulmonary Disease) Comorbidity  Goal: Maintenance of COPD Symptom Control  Outcome: Ongoing, Progressing     Problem: Hypertension Comorbidity  Goal: Blood Pressure in Desired Range  Outcome: Ongoing, Progressing     Problem: Obstructive Sleep Apnea Risk or Actual Comorbidity Management  Goal: Unobstructed Breathing During Sleep  Outcome: Ongoing, Progressing     Problem: Fluid Imbalance (Pneumonia)  Goal: Fluid Balance  Outcome: Ongoing, Progressing     Problem: Infection (Pneumonia)  Goal: Resolution of Infection Signs and Symptoms  Outcome: Ongoing,  Progressing     Problem: Respiratory Compromise (Pneumonia)  Goal: Effective Oxygenation and Ventilation  Outcome: Ongoing, Progressing     Problem: Fall Injury Risk  Goal: Absence of Fall and Fall-Related Injury  Outcome: Ongoing, Progressing     Problem: ARDS (Acute Respiratory Distress Syndrome)  Goal: Effective Oxygenation  Outcome: Ongoing, Progressing     Problem: Gas Exchange Impaired  Goal: Optimal Gas Exchange  Outcome: Ongoing, Progressing     Problem: Airway Clearance Ineffective  Goal: Effective Airway Clearance  Outcome: Ongoing, Progressing

## 2022-12-05 NOTE — PLAN OF CARE
Problem: Adult Inpatient Plan of Care  Goal: Plan of Care Review  Outcome: Ongoing, Progressing     VIRTUAL NURSE:  Cued into patient's room.  Permission received per patient to review admission assessment questions; unable to view patient d/t lights off.  Introduced as VN for night shift that will be working with floor nurse and nursing assistant.  Educated patient on VN's role in patient care and  VIP model.  Plan of care reviewed with patient.  Education per flowsheet.   Informed patient that staff will round on them every 2 hours but to use call light for any other needs they may have; informed of fall risk and fall precautions.  Patient verbalized understanding.  Instructed to call for assistance.  Will cont to monitor and intervene as needed.    Labs, notes, orders, and careplan reviewed.        12/05/22 0029   Patient Request   Patient Requested lights off and unable to view patient. requesting cough medication   Nurse Notification   Bedside Nurse Notified? Yes   Name of Bedside Nurse SALO Mckoy   Nurse Notfication Method Secure Chat   Nurse Notified Of Patient Request   Admission   Initial VN Admission Questions Complete   Communication Issues? Patient Hearing   Shift   Virtual Nurse - Rounding Complete   Pain Management Interventions pain management plan reviewed with patient/caregiver   Virtual Nurse - Patient Verbalized Approval Of Camera Use;VN Rounding   Type of Frequent Check   Type Patient Rounds   Pain/Comfort/Sleep   Preferred Pain Scale number (Numeric Rating Pain Scale)   Comfort/Acceptable Pain Level 0   Pain Rating (0-10): Rest 0   Sleep/Rest/Relaxation awake

## 2022-12-05 NOTE — PLAN OF CARE
Patient received on    1Lpm NC with SpO2   96 %. Pt with no apparent distress noted. Will continue to monitor.

## 2022-12-05 NOTE — PROGRESS NOTES
[facility] Medicine  Progress Note    Patient Name: Jordin Allen Jr.  MRN: [id number]  Patient Class: IP- Inpatient   Admission Date: 12/3/2022  Length of Stay: 1 days  Attending Physician: Devora Villasenor*  Primary Care Provider: APRIL Mccain MD        Subjective:     Principal Problem:Acute respiratory failure with hypoxia        HPI:  78 yo male with a PMHx of COPD, CAD, HTN, GERD, and SHOLA here for SOB and cough.    Patient developed symptoms about 3-4 days ago. Started having worsening SOB at rest with wheezing. Initially flu and covid negative at an urgent care. Cough medicine and inhalers were not very effective. Due to worsening SOB today, he called EMS. EMS stated patientr was hypoxic and visibly SOB. He was given solumedrol on route to the ED. Denied significant cough production, CP, palpitations, dizziness, syncope.    In the ED, patient requiring 4L NC for O2 sat 91% but desaturations immediately upon exertion. Labs showing Flu A positive with CXR without signs of pneumonia. Given duonebs, tamiflu, and admitted to medicine.      Overview/Hospital Course:  He was admitted to the [facility] medicine service for further care.  Labs showed him to be flu positive.  No pneumonia noted on chest x-ray.  He was given Tamiflu and DuoNebs.  Was noted with hypoxia on admission room air sats in the 80s.  Currently receiving O2 at 1 L but in the ED he was requiring 4 L O2.  IV fluids were given.  Noted with diarrhea---Stool studies pending.      Interval History:  seen  at the bedside this morning.  Still requiring oxygen at 1 L nasal cannula.  Patient reports shortness of breath on ambulation.  Still noted with diarrhea--- stool cultures pending.    Review of Systems   Constitutional:  Negative for chills, fatigue and fever.   HENT:  Negative for trouble swallowing.    Respiratory:  Positive for cough and shortness of breath.    Cardiovascular:  Negative for leg swelling.    Gastrointestinal:  Positive for diarrhea. Negative for nausea and vomiting.   Genitourinary:  Negative for difficulty urinating and hematuria.   Musculoskeletal:  Negative for gait problem.   Skin:  Negative for wound.   Neurological:  Positive for weakness.   Hematological:  Does not bruise/bleed easily.   Psychiatric/Behavioral:  The patient is not nervous/anxious.    Objective:     Vital Signs (Most Recent):  Temp: 97.4 °F (36.3 °C) (12/05/22 1151)  Pulse: 91 (12/05/22 1151)  Resp: 18 (12/05/22 1151)  BP: 112/61 (12/05/22 1151)  SpO2: (!) 94 % (12/05/22 1151)   Vital Signs (24h Range):  Temp:  [97.4 °F (36.3 °C)-97.9 °F (36.6 °C)] 97.4 °F (36.3 °C)  Pulse:  [] 91  Resp:  [13-25] 18  SpO2:  [91 %-96 %] 94 %  BP: (112-142)/(57-72) 112/61     Weight: 104.5 kg (230 lb 6.1 oz)  Body mass index is 33.06 kg/m².  No intake or output data in the 24 hours ending 12/05/22 1252   Physical Exam  Vitals and nursing note reviewed.   Constitutional:       Appearance: He is obese. He is ill-appearing.   HENT:      Head: Normocephalic and atraumatic.      Nose: Nose normal.      Mouth/Throat:      Mouth: Mucous membranes are moist.   Eyes:      Extraocular Movements: Extraocular movements intact.      Conjunctiva/sclera: Conjunctivae normal.   Cardiovascular:      Rate and Rhythm: Normal rate and regular rhythm.      Pulses: Normal pulses.      Heart sounds: Normal heart sounds.   Pulmonary:      Effort: No respiratory distress.   Abdominal:      General: There is no distension.      Palpations: Abdomen is soft.      Tenderness: There is no abdominal tenderness. There is no guarding.   Musculoskeletal:         General: Normal range of motion.      Cervical back: Normal range of motion and neck supple.   Skin:     General: Skin is warm.      Capillary Refill: Capillary refill takes 2 to 3 seconds.   Neurological:      Mental Status: He is alert.      Motor: Weakness present.   Psychiatric:         Mood and Affect: Mood  normal.         Behavior: Behavior normal.       Significant Labs: All pertinent labs within the past 24 hours have been reviewed.    Significant Imaging: I have reviewed all pertinent imaging results/findings within the past 24 hours.      Assessment/Plan:      * Acute respiratory failure with hypoxia  Patient with Hypoxic Respiratory failure which is Acute.  he is not on home oxygen. Supplemental oxygen was provided and noted-  .   Signs/symptoms of respiratory failure include- tachypnea, increased work of breathing, respiratory distress and wheezing. Contributing diagnoses includes - COPD and influenza Labs and images were reviewed. Patient Has not had a recent ABG. Will treat underlying causes and adjust management of respiratory failure as follows-     - Duonebs q6h  - Tamiflu 75mg BID  - Prednisone 40mg daily  - Continuous pulse oximetry    Influenza  - Flu A positive with hypoxia and underlying COPD  - Treat COPD exacerbation as below  - Start Tamiflu 75mg BID  - Monitor  - PRN cough medicine      Chronic obstructive pulmonary disease  - COPD exacerbation due to flu  - S/p solu-medrol in EMS  - Start prednisone 40mg daily  - Duonebs q6h  - CXR without pneumonia  - Continue 4L NC for hypoxia      CAD (coronary artery disease)  - ASA and statin      HTN (hypertension), benign  - Continue amlodipine and Losartan      GERD (gastroesophageal reflux disease)  - Monitor  - PRN medications        VTE Risk Mitigation (From admission, onward)         Ordered     enoxaparin injection 40 mg  Daily         12/04/22 0158     IP VTE HIGH RISK PATIENT  Once         12/04/22 0158                Discharge Planning   MEI:      Code Status: Full Code   Is the patient medically ready for discharge?:     Reason for patient still in hospital (select all that apply): Patient trending condition, Laboratory test, Treatment, Imaging and Consult recommendations                     Sissy Krishnan NP  Department of Hospital  [facility]

## 2022-12-05 NOTE — PLAN OF CARE
Problem: Adjustment to Illness COPD (Chronic Obstructive Pulmonary Disease)  Goal: Optimal Chronic Illness Coping  Outcome: Ongoing, Progressing     Problem: Adult Inpatient Plan of Care  Goal: Plan of Care Review  Outcome: Ongoing, Progressing     Problem: Diarrhea  Goal: Fluid and Electrolyte Balance  Outcome: Ongoing, Progressing     Problem: Obstructive Sleep Apnea Risk or Actual Comorbidity Management  Goal: Unobstructed Breathing During Sleep  Outcome: Ongoing, Progressing

## 2022-12-05 NOTE — PLAN OF CARE
SW communicated with pt to discuss dc planning. Pt confirmed information on chart. Pt expressed that he resides in home alone. Pt reports that he has a cpap that he uses at night. Pt reports no hh services. Pt reports that he received a concentrator several years ago but it does not work anymore. SW contacted Shelbi with [facility] to review to see if pt received concentrator from [facility].  Pt reports upon dc one of his daughters will transport him home. Pt was made aware to contact SW with any questions or concerns. SW will continue to follow pt throughout his transitions of care and assist with any dc needs.       Per Shelbi with [facility]: He will need new order -  O2 test- and qualify Dx.     SW requested sooner PCP follow up.     Future Appointments   Date Time Provider Department Center   12/23/2022 11:00 AM LISA Mccain MD [facility] IM [place]   1/18/2023  8:30 AM TAWNY Posada [facility] AUDIO [place]   1/18/2023  9:00 AM TAWNY Posada [facility] AUDIO [place]   2/14/2023 10:30 AM Prince Ba MD [facility] CARDIO [facility]        12/05/22 154   Discharge Assessment   Assessment Type Discharge Planning Assessment   Confirmed/corrected address, phone number and insurance Yes   Confirmed Demographics Correct on Facesheet   Source of Information patient   Communicated MEI with patient/caregiver Yes   Lives With alone   Do you expect to return to your current living situation? Yes   Do you have help at home or someone to help you manage your care at home? Yes   Who are your caregiver(s) and their phone number(s)? Marzena Schwartz (Daughter)   [phone]   Prior to hospitilization cognitive status: Alert/Oriented   Current cognitive status: Alert/Oriented   Walking or Climbing Stairs Difficulty none   Dressing/Bathing Difficulty none   Home Layout Able to live on 1st floor   Equipment Currently Used at Home CPAP   Readmission within 30 days? No   Patient currently being followed by outpatient case  management? No   Do you currently have service(s) that help you manage your care at home? No   Do you take prescription medications? Yes   Do you have prescription coverage? Yes   Coverage Medicare   Do you have any problems affording any of your prescribed medications? No   Is the patient taking medications as prescribed? yes   Who is going to help you get home at discharge? Daughter   How do you get to doctors appointments? car, drives self;family or friend will provide   Are you on dialysis? No   Do you take coumadin? No   Discharge Plan A Home   DME Needed Upon Discharge  none   Discharge Plan discussed with: Patient   Discharge Barriers Identified None

## 2022-12-05 NOTE — SUBJECTIVE & OBJECTIVE
Interval History:  seen  at the bedside this morning.  Still requiring oxygen at 1 L nasal cannula.  Patient reports shortness of breath on ambulation.  Still noted with diarrhea--- stool cultures pending.    Review of Systems   Constitutional:  Negative for chills, fatigue and fever.   HENT:  Negative for trouble swallowing.    Respiratory:  Positive for cough and shortness of breath.    Cardiovascular:  Negative for leg swelling.   Gastrointestinal:  Positive for diarrhea. Negative for nausea and vomiting.   Genitourinary:  Negative for difficulty urinating and hematuria.   Musculoskeletal:  Negative for gait problem.   Skin:  Negative for wound.   Neurological:  Positive for weakness.   Hematological:  Does not bruise/bleed easily.   Psychiatric/Behavioral:  The patient is not nervous/anxious.    Objective:     Vital Signs (Most Recent):  Temp: 97.4 °F (36.3 °C) (12/05/22 1151)  Pulse: 91 (12/05/22 1151)  Resp: 18 (12/05/22 1151)  BP: 112/61 (12/05/22 1151)  SpO2: (!) 94 % (12/05/22 1151)   Vital Signs (24h Range):  Temp:  [97.4 °F (36.3 °C)-97.9 °F (36.6 °C)] 97.4 °F (36.3 °C)  Pulse:  [] 91  Resp:  [13-25] 18  SpO2:  [91 %-96 %] 94 %  BP: (112-142)/(57-72) 112/61     Weight: 104.5 kg (230 lb 6.1 oz)  Body mass index is 33.06 kg/m².  No intake or output data in the 24 hours ending 12/05/22 1252   Physical Exam  Vitals and nursing note reviewed.   Constitutional:       Appearance: He is obese. He is ill-appearing.   HENT:      Head: Normocephalic and atraumatic.      Nose: Nose normal.      Mouth/Throat:      Mouth: Mucous membranes are moist.   Eyes:      Extraocular Movements: Extraocular movements intact.      Conjunctiva/sclera: Conjunctivae normal.   Cardiovascular:      Rate and Rhythm: Normal rate and regular rhythm.      Pulses: Normal pulses.      Heart sounds: Normal heart sounds.   Pulmonary:      Effort: No respiratory distress.   Abdominal:      General: There is no distension.      Palpations:  Abdomen is soft.      Tenderness: There is no abdominal tenderness. There is no guarding.   Musculoskeletal:         General: Normal range of motion.      Cervical back: Normal range of motion and neck supple.   Skin:     General: Skin is warm.      Capillary Refill: Capillary refill takes 2 to 3 seconds.   Neurological:      Mental Status: He is alert.      Motor: Weakness present.   Psychiatric:         Mood and Affect: Mood normal.         Behavior: Behavior normal.       Significant Labs: All pertinent labs within the past 24 hours have been reviewed.    Significant Imaging: I have reviewed all pertinent imaging results/findings within the past 24 hours.

## 2022-12-05 NOTE — HOSPITAL COURSE
He was admitted to the hospital medicine service for further care.  Labs showed him to be flu positive.  No pneumonia noted on chest x-ray.  He was given Tamiflu and DuoNebs.  Was noted with hypoxia on admission room air sats in the 80s.  Currently receiving O2 at 1 L but in the ED he was requiring 4 L O2.  IV fluids were given.  Noted with diarrhea---Stool studies negative.  Patient with improvement of hypoxia. He ambulated on RA with ox sat 96%.  He is medically stable for DC from medical standpoint.

## 2022-12-06 ENCOUNTER — TELEPHONE (OUTPATIENT)
Dept: PULMONOLOGY | Facility: CLINIC | Age: 77
End: 2022-12-06
Payer: MEDICARE

## 2022-12-06 ENCOUNTER — TELEPHONE (OUTPATIENT)
Dept: INTERNAL MEDICINE | Facility: CLINIC | Age: 77
End: 2022-12-06
Payer: MEDICARE

## 2022-12-06 VITALS
HEART RATE: 98 BPM | BODY MASS INDEX: 32.98 KG/M2 | SYSTOLIC BLOOD PRESSURE: 167 MMHG | OXYGEN SATURATION: 94 % | TEMPERATURE: 97 F | WEIGHT: 230.38 LBS | DIASTOLIC BLOOD PRESSURE: 81 MMHG | HEIGHT: 70 IN | RESPIRATION RATE: 18 BRPM

## 2022-12-06 LAB
ANION GAP SERPL CALC-SCNC: 14 MMOL/L (ref 8–16)
BUN SERPL-MCNC: 34 MG/DL (ref 8–23)
CALCIUM SERPL-MCNC: 9.3 MG/DL (ref 8.7–10.5)
CHLORIDE SERPL-SCNC: 104 MMOL/L (ref 95–110)
CO2 SERPL-SCNC: 24 MMOL/L (ref 23–29)
CREAT SERPL-MCNC: 1 MG/DL (ref 0.5–1.4)
CRYPTOSP AG STL QL IA: NEGATIVE
EST. GFR  (NO RACE VARIABLE): >60 ML/MIN/1.73 M^2
G LAMBLIA AG STL QL IA: NEGATIVE
GLUCOSE SERPL-MCNC: 115 MG/DL (ref 70–110)
MAGNESIUM SERPL-MCNC: 2.1 MG/DL (ref 1.6–2.6)
POTASSIUM SERPL-SCNC: 3.5 MMOL/L (ref 3.5–5.1)
RV AG STL QL IA.RAPID: NEGATIVE
SODIUM SERPL-SCNC: 142 MMOL/L (ref 136–145)

## 2022-12-06 PROCEDURE — 83735 ASSAY OF MAGNESIUM: CPT | Performed by: NURSE PRACTITIONER

## 2022-12-06 PROCEDURE — 25000003 PHARM REV CODE 250: Performed by: INTERNAL MEDICINE

## 2022-12-06 PROCEDURE — 27000221 HC OXYGEN, UP TO 24 HOURS

## 2022-12-06 PROCEDURE — 99900035 HC TECH TIME PER 15 MIN (STAT)

## 2022-12-06 PROCEDURE — 80048 BASIC METABOLIC PNL TOTAL CA: CPT | Performed by: NURSE PRACTITIONER

## 2022-12-06 PROCEDURE — 36415 COLL VENOUS BLD VENIPUNCTURE: CPT | Performed by: NURSE PRACTITIONER

## 2022-12-06 PROCEDURE — 25000242 PHARM REV CODE 250 ALT 637 W/ HCPCS: Performed by: INTERNAL MEDICINE

## 2022-12-06 PROCEDURE — 94640 AIRWAY INHALATION TREATMENT: CPT

## 2022-12-06 PROCEDURE — 94761 N-INVAS EAR/PLS OXIMETRY MLT: CPT

## 2022-12-06 PROCEDURE — 63600175 PHARM REV CODE 636 W HCPCS: Performed by: INTERNAL MEDICINE

## 2022-12-06 RX ORDER — IPRATROPIUM BROMIDE AND ALBUTEROL SULFATE 2.5; .5 MG/3ML; MG/3ML
SOLUTION RESPIRATORY (INHALATION)
Qty: 90 ML | Refills: 0 | Status: SHIPPED | OUTPATIENT
Start: 2022-12-06 | End: 2023-11-16 | Stop reason: SDUPTHER

## 2022-12-06 RX ORDER — PREDNISONE 20 MG/1
40 TABLET ORAL DAILY
Qty: 6 TABLET | Refills: 0 | Status: SHIPPED | OUTPATIENT
Start: 2022-12-07 | End: 2022-12-10

## 2022-12-06 RX ORDER — OSELTAMIVIR PHOSPHATE 75 MG/1
75 CAPSULE ORAL 2 TIMES DAILY
Qty: 10 CAPSULE | Refills: 0 | Status: SHIPPED | OUTPATIENT
Start: 2022-12-06 | End: 2022-12-11

## 2022-12-06 RX ADMIN — AMITRIPTYLINE HYDROCHLORIDE 25 MG: 25 TABLET, FILM COATED ORAL at 08:12

## 2022-12-06 RX ADMIN — ATORVASTATIN CALCIUM 80 MG: 40 TABLET, FILM COATED ORAL at 08:12

## 2022-12-06 RX ADMIN — PREDNISONE 40 MG: 20 TABLET ORAL at 08:12

## 2022-12-06 RX ADMIN — OSELTAMIVIR PHOSPHATE 75 MG: 75 CAPSULE ORAL at 08:12

## 2022-12-06 RX ADMIN — LOSARTAN POTASSIUM 100 MG: 50 TABLET, FILM COATED ORAL at 08:12

## 2022-12-06 RX ADMIN — IPRATROPIUM BROMIDE AND ALBUTEROL SULFATE 3 ML: .5; 3 SOLUTION RESPIRATORY (INHALATION) at 07:12

## 2022-12-06 RX ADMIN — AMLODIPINE BESYLATE 5 MG: 5 TABLET ORAL at 08:12

## 2022-12-06 RX ADMIN — ASPIRIN 81 MG: 81 TABLET, COATED ORAL at 08:12

## 2022-12-06 NOTE — TELEPHONE ENCOUNTER
"Was told by scheduling team that " they asked her to schedule patient with Dr Richardson in [facility] office, so appointment with ORACIO Hanson is no longer needed."  "

## 2022-12-06 NOTE — TELEPHONE ENCOUNTER
----- Message from Irvin Moon sent at 12/5/2022  4:08 PM CST -----  Regarding: HFU  Hello, patient is preparing for discharge from [facility] and is requiring a hospital follow up appointment with Dr. Mccain one week from today. I'm unable to schedule an appointment within that time frame. If possible, can you please assist with scheduling this patient and message me back?      DX: Acute respiratory failure with hypoxia      Additional Info: Pt currently has a future appointment scheduled with Dr. Mccain on 12/23/22. Per discharge nurse, would it be possible to have this appointment scheduled sooner for a hospital follow up appointment? If pt can not be scheduled with Dr. Mccain within this time frame please have patient scheduled for the 1st available with another provider.      Thank you,     Irvin Moon  Access Navigator/ [facility]

## 2022-12-06 NOTE — PLAN OF CARE
Problem: Adult Inpatient Plan of Care  Goal: Plan of Care Review  Outcome: Ongoing, Progressing     Problem: Diarrhea  Goal: Fluid and Electrolyte Balance  Outcome: Ongoing, Progressing     Problem: Fall Injury Risk  Goal: Absence of Fall and Fall-Related Injury  Outcome: Ongoing, Progressing

## 2022-12-06 NOTE — TELEPHONE ENCOUNTER
I received a Epic message from Maria Eugenia Flores about Mr Allen being discharged from [facility] today and needing a visit with Dr Mohr. 12-22-22 will be the last day Dr Mohr has until January. I will mail the appointment slip to Mr Allen's home and message back Maria Eugenia Flores Access Navigator. Rebeca Hannon LPN

## 2022-12-06 NOTE — TELEPHONE ENCOUNTER
----- Message from Tara Flores sent at 12/6/2022  9:15 AM CST -----  Regarding: HFU  Patient is discharging today from [facility] and will need a HFU scheduled.  I see patient has upcoming annual appt on 12/23.  Please schedule a sooner HFU and message me back so DC Nurse can relay appointment information to patient prior to discharge.    DX: Acute respiratory failure with hypoxia    Maria Eugenia [place]  Access Navigator/Discharge

## 2022-12-06 NOTE — NURSING
Home Oxygen Evaluation    Date Performed: 2022    1) Patient's Home O2 Sat on room air, while at rest: 98%        If O2 sats on room air at rest are 88% or below, patient qualifies. No additional testing needed. Document N/A in steps 2 and 3. If 89% or above, complete steps 2.      2) Patient's O2 Sat on room air while exercisin%        If O2 sats on room air while exercising remain 89% or above patient does not qualify, no further testing needed Document N/A in step 3. If O2 sats on room air while exercising are 88% or below, continue to step 3.      3) Patient's O2 Sat while exercising on O2: 96% at 0 LPM         (Must show improvement from #2 for patients to qualify)    If O2 sats improve on oxygen, patient qualifies for portable oxygen. If not, the patient does not qualify.

## 2022-12-06 NOTE — PLAN OF CARE
AVS and educational attachments shared with patient via [employer]. Discussed thoroughly. Patient verbalized clear understanding using teach back method. Notified bedside nurse of completion of education. At present no distress noted. Patient to be discharged via w/c with escort service and family with all of patient's belonings. Will cont to be available to patient and family and intervene prn.

## 2022-12-06 NOTE — TELEPHONE ENCOUNTER
----- Message from Tara Flores sent at 12/6/2022  9:28 AM CST -----  Regarding: HFU  Patient will be discharging from [facility] and will need a HFU scheduled with Dr Mohr.  Patient is established.  Please schedule appointment and message me back so DC Nurse can relay appointment information to patient prior to Discharge today.    DX: Acute respiratory failure with hypoxia    Maria Eugenia Birmingham  Access Navigator/Discharge

## 2022-12-06 NOTE — NURSING
Patient discharging home with daughter. All discharge instructions reviewed with VN. IV and telemetry removed, patient tolerated well.

## 2022-12-06 NOTE — PLAN OF CARE
Pt will dc with no needs noted. Pts family will transport him home upon dc. SW will continue to follow pt throughout his transitions of care and assist with any dc needs.       SW explained to pt that he does not qualify for O2 as he passed is O2 eval. Pt reported ten years go he left the hospital and was provided concentrator and portable tanks. Pt reported that he only used the concentrator at night time. Pt reports that he still has dme at his home. Pt reported about a year ago his concentrator stopped working.  Pt was advised to call number on tanks/concentrator to get it replaced. Pt expressed understanding.  Pt reports that he uses a CPAP at night currently.     SW requested Pulm follow up.     Cleared from  . Bedside Nurse and VN notified.    Future Appointments   Date Time Provider Department Center   12/19/2022 11:00 AM Mary Grace Richardson MD [facility]   12/23/2022 11:00 AM LISA Mccain MD [facility] IM [place]   1/18/2023  8:30 AM TAWNY Posada [facility] [place]   1/18/2023  9:00 AM TAWNY Posada [facility] [place]   2/14/2023 10:30 AM Prince Ba MD [facility] CARDIO [place]        12/06/22 1029   Final Note   Assessment Type Final Discharge Note   Anticipated Discharge Disposition Home   Hospital Resources/Appts/Education Provided Appointments scheduled by Navigator/Coordinator   Post-Acute Status   Discharge Delays None known at this time

## 2022-12-06 NOTE — PLAN OF CARE
The proper method of use, as well as anticipated side effects, of this aerosol treatment are discussed and demonstrated to the patient.  Patient on oxygen with documented flow.  Will attempt to wean per O2 order protocol. Will continue to monitor.

## 2022-12-06 NOTE — PROGRESS NOTES
[facility]                    Pharmacy       Discharge Medication Education    Patient ACCEPTED medication education. Pharmacy has provided education on the name, indication, and possible side effects of the medication(s) prescribed, using teach-back method.     The following medications have also been discussed, during this admission.        Medication List        START taking these medications      oseltamivir 75 MG capsule  Commonly known as: TAMIFLU  Take 1 capsule (75 mg total) by mouth 2 (two) times daily. for 5 days            CHANGE how you take these medications      amitriptyline 25 MG tablet  Commonly known as: ELAVIL  TAKE 1 TABLET BY MOUTH EVERY DAY  What changed: when to take this     atorvastatin 80 MG tablet  Commonly known as: LIPITOR  TAKE 1 TABLET (80 MG TOTAL) BY MOUTH ONCE DAILY.  What changed:   how much to take  how to take this  when to take this     budesonide-formoterol 160-4.5 mcg 160-4.5 mcg/actuation Hfaa  Commonly known as: SYMBICORT  2 puffs bid generic please  What changed:   how much to take  how to take this  when to take this     losartan 100 MG tablet  Commonly known as: COZAAR  TAKE 1 TABLET BY MOUTH EVERY DAY  What changed: when to take this     omeprazole 20 MG capsule  Commonly known as: PRILOSEC  TAKE 1 CAPSULE BY MOUTH EVERY DAY  What changed: when to take this     * predniSONE 5 MG tablet  Commonly known as: DELTASONE  TAKE 1 TABLET BY MOUTH EVERY OTHER DAY. TAKE WITH FOOD.  What changed: See the new instructions.     * predniSONE 20 MG tablet  Commonly known as: DELTASONE  Take 2 tablets (40 mg total) by mouth once daily. for 3 days  Start taking on: December 7, 2022  What changed: You were already taking a medication with the same name, and this prescription was added. Make sure you understand how and when to take each.     SPIRIVA WITH HANDIHALER 18 mcg inhalation capsule  Generic drug: tiotropium  INHALE 1 CAPSULE WITH INHALATION DEVICE ONCE  DAILY  What changed:   how much to take  when to take this  additional instructions           * This list has 2 medication(s) that are the same as other medications prescribed for you. Read the directions carefully, and ask your doctor or other care provider to review them with you.                CONTINUE taking these medications      albuterol 90 mcg/actuation inhaler  Commonly known as: PROVENTIL/VENTOLIN HFA  Inhale 2 puffs into the lungs every 4 (four) hours as needed for Wheezing or Shortness of Breath.     albuterol-ipratropium 2.5 mg-0.5 mg/3 mL nebulizer solution  Commonly known as: DUO-NEB  INHALE 1 VIAL VIA NEBULIZER EVERY 6 HOURS AS NEEDEDILF     amLODIPine 5 MG tablet  Commonly known as: NORVASC  Take 1 tablet (5 mg total) by mouth once daily.     aspirin 81 MG EC tablet  Commonly known as: ECOTRIN     benzonatate 100 MG capsule  Commonly known as: TESSALON PERLES  Take 1 capsule (100 mg total) by mouth every 6 (six) hours as needed for Cough.     DALIRESP 500 mcg Tab  Generic drug: roflumilast  Take 1 tablet (500 mcg total) by mouth once daily.     echinacea 400 mg Cap     fluticasone propionate 50 mcg/actuation nasal spray  Commonly known as: FLONASE  1 spray (50 mcg total) by Each Nostril route once daily.     hydroCHLOROthiazide 25 MG tablet  Commonly known as: HYDRODIURIL  Take 0.5 tablets (12.5 mg total) by mouth once daily.     latanoprost 0.005 % ophthalmic solution     nitroGLYCERIN 0.4 MG SL tablet  Commonly known as: NITROSTAT  Place 1 tablet (0.4 mg total) under the tongue every 5 (five) minutes as needed.     OCUVITE ORAL     promethazine-dextromethorphan 6.25-15 mg/5 mL Syrp  Commonly known as: PROMETHAZINE-DM  Take 5 mLs by mouth nightly as needed (cough).            STOP taking these medications      azelastine 137 mcg (0.1 %) nasal spray  Commonly known as: ASTELIN               Where to Get Your Medications        These medications were sent to [facility]  [address]  [address], [place] [address]      Hours: Mon-Fri, 8a-5:30p Phone: [phone]   albuterol-ipratropium 2.5 mg-0.5 mg/3 mL nebulizer solution  oseltamivir 75 MG capsule  predniSONE 20 MG tablet          Thank you  Baudilio Mensah, PharmD

## 2022-12-07 ENCOUNTER — PATIENT OUTREACH (OUTPATIENT)
Dept: ADMINISTRATIVE | Facility: CLINIC | Age: 77
End: 2022-12-07
Payer: MEDICARE

## 2022-12-07 ENCOUNTER — PATIENT MESSAGE (OUTPATIENT)
Dept: ADMINISTRATIVE | Facility: CLINIC | Age: 77
End: 2022-12-07
Payer: MEDICARE

## 2022-12-07 LAB
E COLI SXT1 STL QL IA: NEGATIVE
E COLI SXT2 STL QL IA: NEGATIVE
FAT STL QL: NORMAL
NEUTRAL FAT STL QL: NORMAL
O+P STL MICRO: NORMAL

## 2022-12-07 NOTE — PROGRESS NOTES
C3 nurse spoke with Jordin Allen Jr. for a TCC post hospital discharge follow up call. Returning TCM Mailbox callback. The patient does not have a scheduled HOSFU appointment with APRIL Mccain MD within 5-7 days post hospital discharge date 12/6/22.

## 2022-12-07 NOTE — PROGRESS NOTES
C3 nurse attempted to contact Jordin Allen Jr. for a TCC post hospital discharge follow up call. No answer. No voicemail. Patient portal message sent. The patient does not have a scheduled HOSFU appointment. Message sent to PCP staff for assistance with scheduling visit with patient.

## 2022-12-08 ENCOUNTER — TELEPHONE (OUTPATIENT)
Dept: INTERNAL MEDICINE | Facility: CLINIC | Age: 77
End: 2022-12-08
Payer: MEDICARE

## 2022-12-08 DIAGNOSIS — I10 HTN (HYPERTENSION), BENIGN: Primary | ICD-10-CM

## 2022-12-08 DIAGNOSIS — F17.200 TOBACCO USE DISORDER: ICD-10-CM

## 2022-12-08 DIAGNOSIS — E78.5 DYSLIPIDEMIA: ICD-10-CM

## 2022-12-08 DIAGNOSIS — Z87.891 PERSONAL HISTORY OF NICOTINE DEPENDENCE: ICD-10-CM

## 2022-12-08 LAB
BACTERIA STL CULT: NORMAL
ELASTASE 1, FECAL: 182 MCG/G

## 2022-12-08 NOTE — TELEPHONE ENCOUNTER
Pt scheduled 12/23. He states that he normally has to complete a CT scan and do some Fasting Bloodwork before he is seen.is this needed? He also was released from the hospital on 12/6 and is needing a f/u.

## 2022-12-08 NOTE — TELEPHONE ENCOUNTER
----- Message from Mandei Vaughan sent at 12/7/2022 12:05 PM CST -----  Good Morning,    Mr. Allen is scheduled to come in to see Dr. Mccain on 12/23. He states that he normally has to complete a CT scan and do some Fasting Bloodwork before he is seen. There are no orders in his chart for either. Please advise.    Thank you in advance,  Mandie

## 2022-12-09 LAB — CALPROTECTIN STL-MCNT: 99.4 MCG/G

## 2022-12-09 NOTE — DISCHARGE SUMMARY
[facility] Medicine  Discharge Summary      Patient Name: Jordin Allen Jr.  MRN: [id number]  ELA: [id number]  Patient Class: IP- Inpatient  Admission Date: 12/3/2022  Hospital Length of Stay: 2 days  Discharge Date and Time: 12/6/2022  1:54 PM  Attending Physician: No att. providers found   Discharging Provider: Sheree Gusman NP  Primary Care Provider: APRIL Mccain MD    Primary Care Team: Networked reference to record PCT     HPI:   76 yo male with a PMHx of COPD, CAD, HTN, GERD, and SHOLA here for SOB and cough.    Patient developed symptoms about 3-4 days ago. Started having worsening SOB at rest with wheezing. Initially flu and covid negative at an urgent care. Cough medicine and inhalers were not very effective. Due to worsening SOB today, he called EMS. EMS stated patientr was hypoxic and visibly SOB. He was given solumedrol on route to the ED. Denied significant cough production, CP, palpitations, dizziness, syncope.    In the ED, patient requiring 4L NC for O2 sat 91% but desaturations immediately upon exertion. Labs showing Flu A positive with CXR without signs of pneumonia. Given duonebs, tamiflu, and admitted to medicine.      * No surgery found *      Hospital Course:   He was admitted to the hospital medicine service for further care.  Labs showed him to be flu positive.  No pneumonia noted on chest x-ray.  He was given Tamiflu and DuoNebs.  Was noted with hypoxia on admission room air sats in the 80s.  Currently receiving O2 at 1 L but in the ED he was requiring 4 L O2.  IV fluids were given.  Noted with diarrhea---Stool studies negative.  Patient with improvement of hypoxia. He ambulated on RA with ox sat 96%.  He is medically stable for DC from medical standpoint.        Goals of Care Treatment Preferences:  Code Status: Full Code      Consults:     No new Assessment & Plan notes have been filed under this hospital service since the last note was generated.  Service:  Hospital Medicine    Final Active Diagnoses:    Diagnosis Date Noted POA    PRINCIPAL PROBLEM:  Acute respiratory failure with hypoxia [J96.01] 12/04/2022 Yes    Influenza [J11.1] 12/04/2022 Yes    Chronic obstructive pulmonary disease [J44.9]  Yes    CAD (coronary artery disease) [I25.10] 10/31/2013 Yes     Chronic    HTN (hypertension), benign [I10] 09/07/2012 Yes    GERD (gastroesophageal reflux disease) [K21.9] 08/23/2012 Yes      Problems Resolved During this Admission:       Discharged Condition: stable    Disposition: Home or Self Care    Follow Up:   Follow-up Information     P Jordin Mccain MD Follow up in 1 week(s).    Specialty: Internal Medicine  Contact information:  [address] [facility]  [place] [address]  [phone]             [facility]    Why: Please review [facility] for future follow up appointments.           Scheduling Navigators Follow up.    Why: Scheduling Navigators will contact patient of futrue follow up appointments.                     Patient Instructions:      Ambulatory referral/consult to [facility]   Standing Status: Future   Referral Priority: Routine Referral Type: Consultation   Referral Reason: Specialty Services Required   Number of Visits Requested: 1     Ambulatory referral/consult to Pulmonology   Standing Status: Future   Referral Priority: Routine Referral Type: Consultation   Referral Reason: Specialty Services Required   Requested Specialty: Pulmonary Disease   Number of Visits Requested: 1     Ambulatory referral/consult to Pulmonary Rehab   Standing Status: Future   Referral Priority: Routine Referral Type: Consultation   Referral Reason: Specialty Services Required   Requested Specialty: Pulmonary Disease   Number of Visits Requested: 1     Diet Adult Regular     Activity as tolerated       Significant Diagnostic Studies: Labs: BMP: No results for input(s): GLU, NA, K, CL, CO2, BUN, CREATININE, CALCIUM, MG in the last 48 hours. and CMP  No results for input(s): NA, K, CL, CO2, GLU, BUN, CREATININE, CALCIUM, PROT, ALBUMIN, BILITOT, ALKPHOS, AST, ALT, ANIONGAP, ESTGFRAFRICA, EGFRNONAA in the last 48 hours.    Pending Diagnostic Studies:     None         Medications:  Reconciled Home Medications:      Medication List      START taking these medications    oseltamivir 75 MG capsule  Commonly known as: TAMIFLU  Take 1 capsule (75 mg total) by mouth 2 (two) times daily. for 5 days        CHANGE how you take these medications    amitriptyline 25 MG tablet  Commonly known as: ELAVIL  TAKE 1 TABLET BY MOUTH EVERY DAY  What changed: when to take this     atorvastatin 80 MG tablet  Commonly known as: LIPITOR  TAKE 1 TABLET (80 MG TOTAL) BY MOUTH ONCE DAILY.  What changed:   · how much to take  · how to take this  · when to take this     budesonide-formoterol 160-4.5 mcg 160-4.5 mcg/actuation Hfaa  Commonly known as: SYMBICORT  2 puffs bid generic please  What changed:   · how much to take  · how to take this  · when to take this     losartan 100 MG tablet  Commonly known as: COZAAR  TAKE 1 TABLET BY MOUTH EVERY DAY  What changed: when to take this     omeprazole 20 MG capsule  Commonly known as: PRILOSEC  TAKE 1 CAPSULE BY MOUTH EVERY DAY  What changed: when to take this     * predniSONE 5 MG tablet  Commonly known as: DELTASONE  TAKE 1 TABLET BY MOUTH EVERY OTHER DAY. TAKE WITH FOOD.  What changed: See the new instructions.     * predniSONE 20 MG tablet  Commonly known as: DELTASONE  Take 2 tablets (40 mg total) by mouth once daily. for 3 days  What changed: You were already taking a medication with the same name, and this prescription was added. Make sure you understand how and when to take each.     SPIRIVA WITH HANDIHALER 18 mcg inhalation capsule  Generic drug: tiotropium  INHALE 1 CAPSULE WITH INHALATION DEVICE ONCE DAILY  What changed:   · how much to take  · when to take this  · additional instructions         * This list has 2 medication(s) that are  the same as other medications prescribed for you. Read the directions carefully, and ask your doctor or other care provider to review them with you.            CONTINUE taking these medications    albuterol 90 mcg/actuation inhaler  Commonly known as: PROVENTIL/VENTOLIN HFA  Inhale 2 puffs into the lungs every 4 (four) hours as needed for Wheezing or Shortness of Breath.     albuterol-ipratropium 2.5 mg-0.5 mg/3 mL nebulizer solution  Commonly known as: DUO-NEB  INHALE 1 VIAL VIA NEBULIZER EVERY 6 HOURS AS NEEDEDILF     aspirin 81 MG EC tablet  Commonly known as: ECOTRIN  Take 81 mg by mouth once daily.     benzonatate 100 MG capsule  Commonly known as: TESSALON PERLES  Take 1 capsule (100 mg total) by mouth every 6 (six) hours as needed for Cough.     DALIRESP 500 mcg Tab  Generic drug: roflumilast  Take 1 tablet (500 mcg total) by mouth once daily.     echinacea 400 mg Cap  Take 1 capsule by mouth once daily.     fluticasone propionate 50 mcg/actuation nasal spray  Commonly known as: FLONASE  1 spray (50 mcg total) by Each Nostril route once daily.     hydroCHLOROthiazide 25 MG tablet  Commonly known as: HYDRODIURIL  Take 0.5 tablets (12.5 mg total) by mouth once daily.     latanoprost 0.005 % ophthalmic solution  Place 1 drop into both eyes once daily.     nitroGLYCERIN 0.4 MG SL tablet  Commonly known as: NITROSTAT  Place 1 tablet (0.4 mg total) under the tongue every 5 (five) minutes as needed.     OCUVITE ORAL  Take 1 capsule by mouth once daily.     promethazine-dextromethorphan 6.25-15 mg/5 mL Syrp  Commonly known as: PROMETHAZINE-DM  Take 5 mLs by mouth nightly as needed (cough).        STOP taking these medications    azelastine 137 mcg (0.1 %) nasal spray  Commonly known as: ASTELIN            Indwelling Lines/Drains at time of discharge:   Lines/Drains/Airways     None                 Time spent on the discharge of patient: 40 minutes         Sheree Gusman NP  Department of Hospital Medicine  [place]  - Telemetry

## 2022-12-11 LAB
H PYLORI AG STL QL IA: NOT DETECTED
SPECIMEN SOURCE: 1

## 2022-12-15 ENCOUNTER — PATIENT OUTREACH (OUTPATIENT)
Dept: ADMINISTRATIVE | Facility: HOSPITAL | Age: 77
End: 2022-12-15
Payer: MEDICARE

## 2022-12-16 ENCOUNTER — OFFICE VISIT (OUTPATIENT)
Dept: INTERNAL MEDICINE | Facility: CLINIC | Age: 77
End: 2022-12-16
Payer: MEDICARE

## 2022-12-16 VITALS
BODY MASS INDEX: 34.14 KG/M2 | DIASTOLIC BLOOD PRESSURE: 76 MMHG | OXYGEN SATURATION: 95 % | RESPIRATION RATE: 21 BRPM | WEIGHT: 238.5 LBS | TEMPERATURE: 97 F | HEART RATE: 82 BPM | HEIGHT: 70 IN | SYSTOLIC BLOOD PRESSURE: 128 MMHG

## 2022-12-16 DIAGNOSIS — J10.1 INFLUENZA A H1N1 INFECTION: ICD-10-CM

## 2022-12-16 DIAGNOSIS — J44.9 CHRONIC OBSTRUCTIVE PULMONARY DISEASE, UNSPECIFIED COPD TYPE: ICD-10-CM

## 2022-12-16 DIAGNOSIS — I10 HTN (HYPERTENSION), BENIGN: Primary | ICD-10-CM

## 2022-12-16 DIAGNOSIS — I25.10 CORONARY ARTERY DISEASE INVOLVING NATIVE CORONARY ARTERY OF NATIVE HEART WITHOUT ANGINA PECTORIS: Chronic | ICD-10-CM

## 2022-12-16 DIAGNOSIS — D12.6 ADENOMATOUS POLYP OF COLON, UNSPECIFIED PART OF COLON: ICD-10-CM

## 2022-12-16 DIAGNOSIS — K76.0 NAFLD (NONALCOHOLIC FATTY LIVER DISEASE): ICD-10-CM

## 2022-12-16 DIAGNOSIS — G47.33 OSA (OBSTRUCTIVE SLEEP APNEA): ICD-10-CM

## 2022-12-16 DIAGNOSIS — J43.2 CENTRILOBULAR EMPHYSEMA: Chronic | ICD-10-CM

## 2022-12-16 DIAGNOSIS — E78.5 DYSLIPIDEMIA: ICD-10-CM

## 2022-12-16 DIAGNOSIS — J96.01 ACUTE HYPOXEMIC RESPIRATORY FAILURE: ICD-10-CM

## 2022-12-16 PROCEDURE — 99999 PR PBB SHADOW E&M-EST. PATIENT-LVL V: ICD-10-PCS | Mod: PBBFAC,,, | Performed by: INTERNAL MEDICINE

## 2022-12-16 PROCEDURE — 99214 PR OFFICE/OUTPT VISIT, EST, LEVL IV, 30-39 MIN: ICD-10-PCS | Mod: S$PBB,,, | Performed by: INTERNAL MEDICINE

## 2022-12-16 PROCEDURE — 99999 PR PBB SHADOW E&M-EST. PATIENT-LVL V: CPT | Mod: PBBFAC,,, | Performed by: INTERNAL MEDICINE

## 2022-12-16 PROCEDURE — 99214 OFFICE O/P EST MOD 30 MIN: CPT | Mod: S$PBB,,, | Performed by: INTERNAL MEDICINE

## 2022-12-16 PROCEDURE — 99215 OFFICE O/P EST HI 40 MIN: CPT | Mod: PBBFAC,PO | Performed by: INTERNAL MEDICINE

## 2022-12-16 NOTE — PROGRESS NOTES
History of present illness:   77-year-old gentleman in today for health review and also following up from recent hospitalization December 3rd through December 6, 2022 with influenza and COPD exacerbation.  Hospitalization was uncomplicated.  Chest x-ray with no infiltrates.  He now reports that he is essentially back to baseline with regards to his breathing.  No fever chills body aches.  Taking medications as directed.    Current medications:  Medications all noted reviewed    Review of systems:  General: no fever, chills, generalized body aches. No unexpected weight loss.  Eyes:  No visual disturbances.  HEENT:  No hoarseness, dysphagia, ear pain.  Respiratory:  No cough, no shortness of breath.  Cardiovascular: no chest pain, palpitations, cough, exertional limb pain. No edema.  GI: no nausea, vomiting.  No abdominal pain. No change in bowel habits.  No melena, no hematochezia.  : no dysuria. No change in the color or character of the urine. No urinary frequency.  Musculoskeletal: no joint pain or swelling.  Neurologic:  No focal neurological complaints.  No headaches.  Skin:  No rashes or other concerns.  Psych:  No emotional issues      Health screenings:  Colonoscopy earlier this year.    Up-to-date with vaccinations.    Physical examination:  GENERAL:  Alert, appropriately groomed, no acute distress.  VS:  Blood pressure 128/76.  EYES: sclerae white ,nonicteric. PERRL.  HEENT:  Normocephalic. Ear canals and tympanic membranes normal. Mouth and pharynx normal. No thyromegaly. Trachea midline and freely mobile.  LUNGS:  Clear to ascultation and normal to percussion.  CARDIOVASCULAR:  Normal heart sounds.  No significant murmur. Carotids full bilaterally without bruit.  Pedal pulses intact .  No abdominal bruit.  No peripheral extremity edema.  GI: the abdomen is obese, soft, no distension. No masses , tenderness, organomegaly.    : scrotum, testicles and penis normal..  LYMPHATIC:  No axillary, inguinal ,  cervical adenopathy.  MUSCULOSKELETAL:  Range of motion, stability and strength of the right and left upper and lower extremities normal. No swollen or tender joints  NEUROLOGIC:  DTR's normal. No gross motor or sensory deficits apparent, gait normal.  SKIN:  No rashes.   MS:  Alert, oriented , affect and mood all appropriate      Data:  Reviewed the recent discharge summary.  Reviewed recent lab data and radiographic imaging.        Impression:   Recent influenza with COPD exacerbation now resolved and back to baseline.      Hypertension controlled.      CAD clinically stable followed by Cardiology with upcoming follow-up appointment.      COPD clinically now stable has follow-up next week with pulmonology.      Dyslipidemia statin therapy.      Obstructive sleep apnea on CPAP.      Adenomatous colon polyps up-to-date with surveillance colonoscopy.    Hepatic steatosis          Plan:   Continue current pharmacologic regimens.    Update lab data today to include lipid profile, TSH and urinalysis.  Continue subspecialty follow-up and scheduled appointments.    Return clinic six months follow-up.

## 2022-12-20 ENCOUNTER — OFFICE VISIT (OUTPATIENT)
Dept: UROLOGY | Facility: CLINIC | Age: 77
End: 2022-12-20
Payer: MEDICARE

## 2022-12-20 VITALS
WEIGHT: 238.19 LBS | SYSTOLIC BLOOD PRESSURE: 152 MMHG | HEIGHT: 70 IN | BODY MASS INDEX: 34.1 KG/M2 | HEART RATE: 108 BPM | DIASTOLIC BLOOD PRESSURE: 88 MMHG

## 2022-12-20 DIAGNOSIS — N49.2 SCROTAL ABSCESS: Primary | ICD-10-CM

## 2022-12-20 DIAGNOSIS — J44.9 CHRONIC OBSTRUCTIVE PULMONARY DISEASE, UNSPECIFIED COPD TYPE: Primary | ICD-10-CM

## 2022-12-20 PROCEDURE — 99204 PR OFFICE/OUTPT VISIT, NEW, LEVL IV, 45-59 MIN: ICD-10-PCS | Mod: S$PBB,,, | Performed by: UROLOGY

## 2022-12-20 PROCEDURE — 99214 OFFICE O/P EST MOD 30 MIN: CPT | Mod: PBBFAC,PO | Performed by: UROLOGY

## 2022-12-20 PROCEDURE — 99204 OFFICE O/P NEW MOD 45 MIN: CPT | Mod: S$PBB,,, | Performed by: UROLOGY

## 2022-12-20 PROCEDURE — 99999 PR PBB SHADOW E&M-EST. PATIENT-LVL IV: ICD-10-PCS | Mod: PBBFAC,,, | Performed by: UROLOGY

## 2022-12-20 PROCEDURE — 99999 PR PBB SHADOW E&M-EST. PATIENT-LVL IV: CPT | Mod: PBBFAC,,, | Performed by: UROLOGY

## 2022-12-20 RX ORDER — SULFAMETHOXAZOLE AND TRIMETHOPRIM 800; 160 MG/1; MG/1
1 TABLET ORAL 2 TIMES DAILY
Qty: 14 TABLET | Refills: 0 | Status: SHIPPED | OUTPATIENT
Start: 2022-12-20 | End: 2022-12-27

## 2022-12-20 NOTE — PROGRESS NOTES
Subjective:       Patient ID: Jordin Allen Jr. is a 77 y.o. male.    Chief Complaint: Other (Scrotal Cyst )     This is a 77 y.o.  male patient that is new to me.  The patient was self referred draining scrotal lesion.  He notes a history of scrotal abscesses in the past requiring incision and drainage years ago.  Noted discomfort left-sided scrotum with a small superficial possible cyst.  Noted spontaneous drainage from this area of bloody purulent fluid a few days ago and noted improvement in his discomfort.  Mild discomfort left scrotum.  No fever or chills.  No significant issues urinating.    LAST PSA  Lab Results   Component Value Date    PSA 2.0 12/09/2021    PSA 1.9 10/31/2019    PSA 1.8 10/10/2018    PSA 1.9 08/08/2017    PSA 1.6 07/28/2016    PSA 1.5 07/06/2015    PSA 1.8 06/02/2014    PSA 1.81 05/30/2013    PSA 1.40 04/30/2012    PSA 1.2 04/26/2011    PSA 0.93 04/21/2010    PSA 0.97 04/16/2009    PSA 1.1 02/06/2008    PSA 1.5 01/05/2007    PSA 0.7 05/01/2006    PSA 1.1 02/11/2005    PSA 0.7 01/23/2004       Lab Results   Component Value Date    CREATININE 1.0 12/06/2022       ---  PMH/PSH/Medications/Allergies/Social history reviewed and as in chart.    Review of Systems   Constitutional:  Negative for activity change, chills and fever.   HENT:  Negative for congestion.    Respiratory:  Negative for cough, chest tightness and shortness of breath.    Cardiovascular:  Negative for chest pain and palpitations.   Gastrointestinal:  Negative for abdominal distention, abdominal pain, nausea and vomiting.   Genitourinary:  Positive for scrotal swelling. Negative for difficulty urinating, flank pain, hematuria, penile pain and testicular pain.   Musculoskeletal:  Negative for gait problem.     Objective:      Physical Exam  HENT:      Head: Atraumatic.   Pulmonary:      Effort: Pulmonary effort is normal.   Genitourinary:     Comments: Left scrotum with small area of induration with open area consistent  with spontaneous draining abscess.  Expressed small amount of purulence from this area, no drainable fluid collection  remains  Neurological:      General: No focal deficit present.      Mental Status: He is alert and oriented to person, place, and time.       Assessment:     Problem Noted   Scrotal Abscess 1/25/2019    Spontaneously draining, no drainable residual fluid collection on exam.         Plan:     Antibiotics for 1 week.  Follow-up as needed.  Sitz baths daily.    Zana Rodriges MD    The above referenced imaging and interpretations were personally reviewed.  Disclaimer: This note has been generated using voice-recognition software. There may be typographical errors that have been missed during proof-reading.

## 2022-12-21 ENCOUNTER — HOSPITAL ENCOUNTER (OUTPATIENT)
Dept: RADIOLOGY | Facility: HOSPITAL | Age: 77
Discharge: HOME | End: 2022-12-21
Attending: INTERNAL MEDICINE
Payer: MEDICARE

## 2022-12-21 DIAGNOSIS — J44.9 CHRONIC OBSTRUCTIVE PULMONARY DISEASE, UNSPECIFIED COPD TYPE: ICD-10-CM

## 2022-12-21 PROCEDURE — 71046 X-RAY EXAM CHEST 2 VIEWS: CPT | Mod: 26,,, | Performed by: RADIOLOGY

## 2022-12-21 PROCEDURE — 71046 XR CHEST PA AND LATERAL: ICD-10-PCS | Mod: 26,,, | Performed by: RADIOLOGY

## 2022-12-21 PROCEDURE — 71046 X-RAY EXAM CHEST 2 VIEWS: CPT | Mod: TC,PO

## 2022-12-31 ENCOUNTER — EXTERNAL CHRONIC CARE MANAGEMENT (OUTPATIENT)
Dept: PRIMARY CARE CLINIC | Facility: CLINIC | Age: 77
End: 2022-12-31
Payer: MEDICARE

## 2022-12-31 PROCEDURE — 99490 CHRNC CARE MGMT STAFF 1ST 20: CPT | Mod: PBBFAC,PO | Performed by: INTERNAL MEDICINE

## 2022-12-31 PROCEDURE — 99490 CHRNC CARE MGMT STAFF 1ST 20: CPT | Mod: S$PBB,,, | Performed by: INTERNAL MEDICINE

## 2022-12-31 PROCEDURE — 99490 PR CHRONIC CARE MGMT, 1ST 20 MIN: ICD-10-PCS | Mod: S$PBB,,, | Performed by: INTERNAL MEDICINE

## 2023-01-13 ENCOUNTER — OFFICE VISIT (OUTPATIENT)
Dept: UROLOGY | Facility: CLINIC | Age: 78
End: 2023-01-13
Payer: MEDICARE

## 2023-01-13 VITALS
SYSTOLIC BLOOD PRESSURE: 137 MMHG | HEART RATE: 83 BPM | BODY MASS INDEX: 34.63 KG/M2 | DIASTOLIC BLOOD PRESSURE: 78 MMHG | WEIGHT: 241.88 LBS | HEIGHT: 70 IN

## 2023-01-13 DIAGNOSIS — N49.2 SCROTAL ABSCESS: ICD-10-CM

## 2023-01-13 PROCEDURE — 99213 PR OFFICE/OUTPT VISIT, EST, LEVL III, 20-29 MIN: ICD-10-PCS | Mod: S$PBB,,, | Performed by: UROLOGY

## 2023-01-13 PROCEDURE — 99214 OFFICE O/P EST MOD 30 MIN: CPT | Mod: PBBFAC,PO | Performed by: UROLOGY

## 2023-01-13 PROCEDURE — 99999 PR PBB SHADOW E&M-EST. PATIENT-LVL IV: ICD-10-PCS | Mod: PBBFAC,,, | Performed by: UROLOGY

## 2023-01-13 PROCEDURE — 99213 OFFICE O/P EST LOW 20 MIN: CPT | Mod: S$PBB,,, | Performed by: UROLOGY

## 2023-01-13 PROCEDURE — 99999 PR PBB SHADOW E&M-EST. PATIENT-LVL IV: CPT | Mod: PBBFAC,,, | Performed by: UROLOGY

## 2023-01-13 NOTE — PROGRESS NOTES
"Subjective:       Patient ID: Jordin Allen Jr. is a 77 y.o. male.    Chief Complaint: Follow-up     This is a 77 y.o.  male patient with history of scrotal abscess..  He notes a history of scrotal abscesses in the past requiring incision and drainage years ago.  Noted discomfort left-sided scrotum with a small superficial possible cyst" and had spontaneous drainage from this area of bloody purulent fluid a few days.  Scrotal abscess resolved 01/2023     LAST PSA  Lab Results   Component Value Date    PSA 2.0 12/09/2021    PSA 1.9 10/31/2019    PSA 1.8 10/10/2018    PSA 1.9 08/08/2017    PSA 1.6 07/28/2016    PSA 1.5 07/06/2015    PSA 1.8 06/02/2014    PSA 1.81 05/30/2013    PSA 1.40 04/30/2012    PSA 1.2 04/26/2011    PSA 0.93 04/21/2010    PSA 0.97 04/16/2009    PSA 1.1 02/06/2008    PSA 1.5 01/05/2007    PSA 0.7 05/01/2006    PSA 1.1 02/11/2005    PSA 0.7 01/23/2004       Lab Results   Component Value Date    CREATININE 1.0 12/06/2022       ---  PMH/PSH/Medications/Allergies/Social history reviewed and as in chart.    Review of Systems   Constitutional:  Negative for activity change, chills and fever.   HENT:  Negative for congestion.    Respiratory:  Negative for cough, chest tightness and shortness of breath.    Cardiovascular:  Negative for chest pain and palpitations.   Gastrointestinal:  Negative for abdominal distention, abdominal pain, nausea and vomiting.   Genitourinary:  Positive for scrotal swelling. Negative for difficulty urinating, flank pain, hematuria, penile pain and testicular pain.   Musculoskeletal:  Negative for gait problem.     Objective:      Physical Exam  HENT:      Head: Atraumatic.   Pulmonary:      Effort: Pulmonary effort is normal.   Genitourinary:     Comments: Left scrotal abscess resolved.  Neurological:      General: No focal deficit present.      Mental Status: He is alert and oriented to person, place, and time.       Assessment:     Problem Noted   Scrotal Abscess " (Resolved) 1/25/2019    Resolved         Plan:     Abscess resolved, no further intervention at current.  Follow-up as needed.    Zana Rodriges MD    The above referenced imaging and interpretations were personally reviewed.  Disclaimer: This note has been generated using voice-recognition software. There may be typographical errors that have been missed during proof-reading.

## 2023-01-18 ENCOUNTER — CLINICAL SUPPORT (OUTPATIENT)
Dept: AUDIOLOGY | Facility: CLINIC | Age: 78
End: 2023-01-18
Payer: MEDICARE

## 2023-01-18 DIAGNOSIS — H90.3 SENSORINEURAL HEARING LOSS, BILATERAL: ICD-10-CM

## 2023-01-18 DIAGNOSIS — H90.3 SENSORINEURAL HEARING LOSS, BILATERAL: Primary | ICD-10-CM

## 2023-01-18 DIAGNOSIS — H90.A32 MIXED CONDUCTIVE AND SENSORINEURAL HEARING LOSS OF LEFT EAR WITH RESTRICTED HEARING OF RIGHT EAR: Primary | ICD-10-CM

## 2023-01-18 PROCEDURE — 99999 PR PBB SHADOW E&M-EST. PATIENT-LVL I: CPT | Mod: PBBFAC,,, | Performed by: AUDIOLOGIST

## 2023-01-18 PROCEDURE — 92567 TYMPANOMETRY: CPT | Mod: PBBFAC | Performed by: AUDIOLOGIST

## 2023-01-18 PROCEDURE — 99999 PR PBB SHADOW E&M-EST. PATIENT-LVL I: ICD-10-PCS | Mod: PBBFAC,,, | Performed by: AUDIOLOGIST

## 2023-01-18 PROCEDURE — 99211 OFF/OP EST MAY X REQ PHY/QHP: CPT | Mod: PBBFAC | Performed by: AUDIOLOGIST

## 2023-01-18 PROCEDURE — 92557 COMPREHENSIVE HEARING TEST: CPT | Mod: PBBFAC | Performed by: AUDIOLOGIST

## 2023-01-18 NOTE — PROGRESS NOTES
Date:  1/18/2023    Patient:  Jordin Allen Jr.    Pt seen today for a hearing aid evaluation.  He was fit with the following device.      Hearing Aid Information:  Widex Moment sRIC R D [place] [place] [address] Rechargeable   RT SN  [id number]   LT SN   [id number]   : P2   Dome: LINDA  Warranty Exp 02/03/2026     Fit good and patient reported good subjective benefit.  Proper care and maintenance was discussed.  Purchase agreement was reviewed and signed.  Patient acknowledged understanding of the 30 day trial period, $250 restocking fee, and the fact that we do not file insurance on behalf of the patient. Pt should follow up in two weeks.

## 2023-01-18 NOTE — PROGRESS NOTES
Jordin Allen Jr., a 77 y.o. male, was seen today in the clinic for an audiologic evaluation.  The patient's main complaint was hearing loss.  Pt has a history of bilateral hearing aids and has previously worn hearing aids.  Mr. Allen reported that he feels that his hearing as worsened and he is not receiving benefit from his older model hearing aids.    Tympanometry revealed Type B in the right ear and Type B in the left ear. 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  Speech reception thresholds were noted at 45 dB in the right ear and 65 dB in the left ear.  Speech discrimination scores were 92% in the right ear and 76% in the left ear.    Recommendations:  Otologic evaluation  Annual audiogram  Hearing protection when in noise  Hearing aid fitting          
no

## 2023-01-30 ENCOUNTER — CLINICAL SUPPORT (OUTPATIENT)
Dept: AUDIOLOGY | Facility: CLINIC | Age: 78
End: 2023-01-30
Payer: MEDICARE

## 2023-01-30 DIAGNOSIS — R52 PAIN: Primary | ICD-10-CM

## 2023-01-30 DIAGNOSIS — H90.A32 MIXED CONDUCTIVE AND SENSORINEURAL HEARING LOSS OF LEFT EAR WITH RESTRICTED HEARING OF RIGHT EAR: Primary | ICD-10-CM

## 2023-01-30 PROCEDURE — 99499 NO LOS: ICD-10-PCS | Mod: S$PBB,,, | Performed by: AUDIOLOGIST

## 2023-01-30 PROCEDURE — 99499 UNLISTED E&M SERVICE: CPT | Mod: S$PBB,,, | Performed by: AUDIOLOGIST

## 2023-01-30 NOTE — PROGRESS NOTES
Jordin Allen JrGreg, a 77 y.o. male, was seen today for a two week hearing aid cleaning and check.  He was fit 2 weeks ago with a new set of Widex Moment aids.  Pt reported that his aid(s) are working well.  He has had to change the wax guard in his left ear one time.  We discussed cleaning and the need for routine cleaning due to the excessive cerumen build up that he experiences.  Aid(s) cleaned and checked.  Listening check confirmed aid(s) working well.  Pt chose not to utilize bluetooth connectivity at this time.  Pt will follow up annually unless otherwise needed.    Hearing Aid Information:  Widex Moment sRIC R D [address] [place] [address] Rechargeable   RT SN  [id number]   LT SN   [id number]   : SUKHI Wilkerson: LINDA Zaidi Exp 02/03/2026     Recommendations:  Daily use of hearing aids  Return for annual hearing test and hearing aid check  Contact office if issues arise

## 2023-01-31 ENCOUNTER — EXTERNAL CHRONIC CARE MANAGEMENT (OUTPATIENT)
Dept: PRIMARY CARE CLINIC | Facility: CLINIC | Age: 78
End: 2023-01-31
Payer: MEDICARE

## 2023-01-31 PROCEDURE — 99490 PR CHRONIC CARE MGMT, 1ST 20 MIN: ICD-10-PCS | Mod: S$PBB,,, | Performed by: INTERNAL MEDICINE

## 2023-01-31 PROCEDURE — 99490 CHRNC CARE MGMT STAFF 1ST 20: CPT | Mod: S$PBB,,, | Performed by: INTERNAL MEDICINE

## 2023-01-31 PROCEDURE — 99439 CHRNC CARE MGMT STAF EA ADDL: CPT | Mod: S$PBB,,, | Performed by: INTERNAL MEDICINE

## 2023-01-31 PROCEDURE — 99439 CHRNC CARE MGMT STAF EA ADDL: CPT | Mod: PBBFAC,PO,27 | Performed by: INTERNAL MEDICINE

## 2023-01-31 PROCEDURE — 99490 CHRNC CARE MGMT STAFF 1ST 20: CPT | Mod: PBBFAC,PO,27 | Performed by: INTERNAL MEDICINE

## 2023-01-31 PROCEDURE — 99439 PR CHRONIC CARE MGMT, EA ADDTL 20 MIN: ICD-10-PCS | Mod: S$PBB,,, | Performed by: INTERNAL MEDICINE

## 2023-02-01 ENCOUNTER — OFFICE VISIT (OUTPATIENT)
Dept: ORTHOPEDICS | Facility: CLINIC | Age: 78
End: 2023-02-01
Payer: MEDICARE

## 2023-02-01 ENCOUNTER — HOSPITAL ENCOUNTER (OUTPATIENT)
Dept: RADIOLOGY | Facility: HOSPITAL | Age: 78
Discharge: HOME | End: 2023-02-01
Attending: ORTHOPAEDIC SURGERY
Payer: MEDICARE

## 2023-02-01 VITALS
HEART RATE: 96 BPM | DIASTOLIC BLOOD PRESSURE: 82 MMHG | BODY MASS INDEX: 34.12 KG/M2 | HEIGHT: 70 IN | WEIGHT: 238.31 LBS | SYSTOLIC BLOOD PRESSURE: 127 MMHG

## 2023-02-01 DIAGNOSIS — R52 PAIN: ICD-10-CM

## 2023-02-01 DIAGNOSIS — S83.412A SPRAIN OF MEDIAL COLLATERAL LIGAMENT OF LEFT KNEE, INITIAL ENCOUNTER: Primary | ICD-10-CM

## 2023-02-01 PROCEDURE — 99214 OFFICE O/P EST MOD 30 MIN: CPT | Mod: PBBFAC,PN | Performed by: ORTHOPAEDIC SURGERY

## 2023-02-01 PROCEDURE — 99204 PR OFFICE/OUTPT VISIT, NEW, LEVL IV, 45-59 MIN: ICD-10-PCS | Mod: S$PBB,,, | Performed by: ORTHOPAEDIC SURGERY

## 2023-02-01 PROCEDURE — 99999 PR PBB SHADOW E&M-EST. PATIENT-LVL IV: CPT | Mod: PBBFAC,,, | Performed by: ORTHOPAEDIC SURGERY

## 2023-02-01 PROCEDURE — 73564 X-RAY EXAM KNEE 4 OR MORE: CPT | Mod: TC,PN,LT

## 2023-02-01 PROCEDURE — 73564 X-RAY EXAM KNEE 4 OR MORE: CPT | Mod: 26,LT,, | Performed by: STUDENT IN AN ORGANIZED HEALTH CARE EDUCATION/TRAINING PROGRAM

## 2023-02-01 PROCEDURE — 73564 XR KNEE COMP 4 OR MORE VIEWS LEFT: ICD-10-PCS | Mod: 26,LT,, | Performed by: STUDENT IN AN ORGANIZED HEALTH CARE EDUCATION/TRAINING PROGRAM

## 2023-02-01 PROCEDURE — 99999 PR PBB SHADOW E&M-EST. PATIENT-LVL IV: ICD-10-PCS | Mod: PBBFAC,,, | Performed by: ORTHOPAEDIC SURGERY

## 2023-02-01 PROCEDURE — 99204 OFFICE O/P NEW MOD 45 MIN: CPT | Mod: S$PBB,,, | Performed by: ORTHOPAEDIC SURGERY
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
## 2023-02-14 ENCOUNTER — OFFICE VISIT (OUTPATIENT)
Dept: CARDIOLOGY | Facility: CLINIC | Age: 78
End: 2023-02-14
Payer: MEDICARE

## 2023-02-14 VITALS
OXYGEN SATURATION: 97 % | BODY MASS INDEX: 34.09 KG/M2 | DIASTOLIC BLOOD PRESSURE: 70 MMHG | WEIGHT: 238.13 LBS | HEART RATE: 76 BPM | SYSTOLIC BLOOD PRESSURE: 125 MMHG | HEIGHT: 70 IN

## 2023-02-14 DIAGNOSIS — G47.33 OSA (OBSTRUCTIVE SLEEP APNEA): ICD-10-CM

## 2023-02-14 DIAGNOSIS — I70.0 AORTIC ATHEROSCLEROSIS: Primary | ICD-10-CM

## 2023-02-14 DIAGNOSIS — E66.09 CLASS 1 OBESITY DUE TO EXCESS CALORIES WITH SERIOUS COMORBIDITY AND BODY MASS INDEX (BMI) OF 34.0 TO 34.9 IN ADULT: ICD-10-CM

## 2023-02-14 DIAGNOSIS — E78.5 DYSLIPIDEMIA: ICD-10-CM

## 2023-02-14 DIAGNOSIS — I25.10 CORONARY ARTERY DISEASE INVOLVING NATIVE CORONARY ARTERY OF NATIVE HEART WITHOUT ANGINA PECTORIS: Chronic | ICD-10-CM

## 2023-02-14 DIAGNOSIS — I10 HTN (HYPERTENSION), BENIGN: ICD-10-CM

## 2023-02-14 PROCEDURE — 99999 PR PBB SHADOW E&M-EST. PATIENT-LVL III: CPT | Mod: PBBFAC,,, | Performed by: INTERNAL MEDICINE

## 2023-02-14 PROCEDURE — 99213 OFFICE O/P EST LOW 20 MIN: CPT | Mod: PBBFAC | Performed by: INTERNAL MEDICINE

## 2023-02-14 PROCEDURE — 99214 PR OFFICE/OUTPT VISIT, EST, LEVL IV, 30-39 MIN: ICD-10-PCS | Mod: S$PBB,,, | Performed by: INTERNAL MEDICINE

## 2023-02-14 PROCEDURE — 99999 PR PBB SHADOW E&M-EST. PATIENT-LVL III: ICD-10-PCS | Mod: PBBFAC,,, | Performed by: INTERNAL MEDICINE

## 2023-02-14 PROCEDURE — 99214 OFFICE O/P EST MOD 30 MIN: CPT | Mod: S$PBB,,, | Performed by: INTERNAL MEDICINE
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
## 2023-02-27 ENCOUNTER — OFFICE VISIT (OUTPATIENT)
Dept: OTOLARYNGOLOGY | Facility: CLINIC | Age: 78
End: 2023-02-27
Payer: MEDICARE

## 2023-02-27 DIAGNOSIS — H65.92 LEFT OTITIS MEDIA WITH EFFUSION: ICD-10-CM

## 2023-02-27 DIAGNOSIS — H61.22 IMPACTED CERUMEN OF LEFT EAR: Primary | ICD-10-CM

## 2023-02-27 DIAGNOSIS — H91.8X3 ASYMMETRICAL HEARING LOSS: ICD-10-CM

## 2023-02-27 PROCEDURE — 99999 PR PBB SHADOW E&M-EST. PATIENT-LVL III: CPT | Mod: PBBFAC,,, | Performed by: OTOLARYNGOLOGY

## 2023-02-27 PROCEDURE — 99999 PR PBB SHADOW E&M-EST. PATIENT-LVL III: ICD-10-PCS | Mod: PBBFAC,,, | Performed by: OTOLARYNGOLOGY

## 2023-02-27 PROCEDURE — 99213 OFFICE O/P EST LOW 20 MIN: CPT | Mod: S$PBB,25,, | Performed by: OTOLARYNGOLOGY

## 2023-02-27 PROCEDURE — 99213 PR OFFICE/OUTPT VISIT, EST, LEVL III, 20-29 MIN: ICD-10-PCS | Mod: S$PBB,25,, | Performed by: OTOLARYNGOLOGY

## 2023-02-27 PROCEDURE — 99213 OFFICE O/P EST LOW 20 MIN: CPT | Mod: PBBFAC,25 | Performed by: OTOLARYNGOLOGY

## 2023-02-27 PROCEDURE — 69210 REMOVE IMPACTED EAR WAX UNI: CPT | Mod: PBBFAC | Performed by: OTOLARYNGOLOGY

## 2023-02-27 PROCEDURE — 69210 REMOVE IMPACTED EAR WAX UNI: CPT | Mod: S$PBB,,, | Performed by: OTOLARYNGOLOGY

## 2023-02-27 PROCEDURE — 69210 PR REMOVAL IMPACTED CERUMEN REQUIRING INSTRUMENTATION, UNILATERAL: ICD-10-PCS | Mod: S$PBB,,, | Performed by: OTOLARYNGOLOGY

## 2023-02-27 NOTE — PROGRESS NOTES
Subjective:      Jordin Allen Jr. is a 77 y.o. male who was self-referred for hearing loss.    Mr. Allen reports having feedback from his left ear aid. He feels when he moves his hearing aids start making noise. He denies ear pain or drainage. He does have a history of PE tubes bilaterally.       Past Medical History  He has a past medical history of Benign neoplasm of colon, Bronchitis chronic, CAD (coronary artery disease), COPD (chronic obstructive pulmonary disease), Emphysema of lung, GERD (gastroesophageal reflux disease), colonic polyps, Hypertension, NAFLD (nonalcoholic fatty liver disease), SHOLA on CPAP, RLS (restless legs syndrome), and Screen for colon cancer.    Past Surgical History  He has a past surgical history that includes Cataract extraction, bilateral; bilateral foot surgery (1993); Colon surgery (2013); Cardiac catheterization (2013); Colonoscopy (N/A, 3/21/2018); scrotal abscess removal; Colonoscopy (N/A, 4/12/2019); and Colonoscopy (N/A, 5/31/2022).    Family History  His family history includes Heart attack in his mother; Heart disease in his mother; Hypertension in his mother.    Social History  He reports that he quit smoking about 10 years ago. His smoking use included cigarettes. He has a 40.00 pack-year smoking history. He has never been exposed to tobacco smoke. He has never used smokeless tobacco. He reports current alcohol use. He reports that he does not use drugs.    Allergies  He is allergic to brilinta [ticagrelor], lisinopril, and metoprolol succinate.    Medications  He has a current medication list which includes the following prescription(s): albuterol, albuterol-ipratropium, amitriptyline, amlodipine, aspirin, atorvastatin, benzonatate, beta-carotene(a)-vits c,e/mins, budesonide-formoterol 160-4.5 mcg, echinacea, fluticasone propionate, hydrochlorothiazide, latanoprost, losartan, nitroglycerin, omeprazole, prednisone, roflumilast, and spiriva with handihaler.    Review  of Systems   Constitutional: Negative.  Negative for chills, fever and unexpected weight change.   HENT:  Positive for hearing loss, postnasal drip and tinnitus. Negative for ear discharge, ear pain, sore throat and trouble swallowing.    Eyes: Negative.    Respiratory:  Positive for cough and shortness of breath.    Cardiovascular: Negative.    Gastrointestinal: Negative.    Endocrine: Negative.    Genitourinary: Negative.    Musculoskeletal: Negative.    Skin: Negative.    Allergic/Immunologic: Negative.    Neurological: Negative.  Negative for dizziness and headaches.   Hematological: Negative.    Psychiatric/Behavioral: Negative.  Negative for agitation and sleep disturbance. The patient is not nervous/anxious.         Objective:     There were no vitals taken for this visit.     Constitutional:   He appears well-developed and well-nourished.     Head:  Normocephalic and atraumatic.     Ears:    Ears:        L EAC with CI.    Procedure Note:    The patient was brought to the minor procedure room and placed under the operating microscope. Using a combination of suction, curettes and cup forceps the patient's cerumen impaction was removed. The tympanic membrane was evaluated and was unremarkable. The patient tolerated the procedure well. There were no complications.    Pos scant OME.    Procedure    None      Data Reviewed    WBC (K/uL)   Date Value   12/03/2022 10.79     Platelets (K/uL)   Date Value   12/03/2022 176      Creatinine (mg/dL)   Date Value   12/06/2022 1.0     TSH (uIU/mL)   Date Value   12/16/2022 0.930     Glucose (mg/dL)   Date Value   12/06/2022 115 (H)     Hemoglobin A1C (%)   Date Value   05/30/2013 5.8                Assessment:     1. Impacted cerumen of left ear    2. Left otitis media with effusion    3. Asymmetrical hearing loss         Plan:         F/U prn

## 2023-02-28 ENCOUNTER — EXTERNAL CHRONIC CARE MANAGEMENT (OUTPATIENT)
Dept: PRIMARY CARE CLINIC | Facility: CLINIC | Age: 78
End: 2023-02-28
Payer: MEDICARE

## 2023-02-28 PROCEDURE — 99439 CHRNC CARE MGMT STAF EA ADDL: CPT | Mod: PBBFAC,27,PO | Performed by: INTERNAL MEDICINE

## 2023-02-28 PROCEDURE — 99439 CHRNC CARE MGMT STAF EA ADDL: CPT | Mod: S$PBB,,, | Performed by: INTERNAL MEDICINE

## 2023-02-28 PROCEDURE — 99490 CHRNC CARE MGMT STAFF 1ST 20: CPT | Mod: PBBFAC,25,PO | Performed by: INTERNAL MEDICINE

## 2023-02-28 PROCEDURE — 99490 CHRNC CARE MGMT STAFF 1ST 20: CPT | Mod: S$PBB,,, | Performed by: INTERNAL MEDICINE

## 2023-02-28 PROCEDURE — 99490 PR CHRONIC CARE MGMT, 1ST 20 MIN: ICD-10-PCS | Mod: S$PBB,,, | Performed by: INTERNAL MEDICINE

## 2023-02-28 PROCEDURE — 99439 PR CHRONIC CARE MGMT, EA ADDTL 20 MIN: ICD-10-PCS | Mod: S$PBB,,, | Performed by: INTERNAL MEDICINE

## 2023-03-06 PROBLEM — J96.01 ACUTE RESPIRATORY FAILURE WITH HYPOXIA: Status: RESOLVED | Noted: 2022-12-04 | Resolved: 2023-03-06

## 2023-03-24 RX ORDER — LOSARTAN POTASSIUM 100 MG/1
100 TABLET ORAL DAILY
Qty: 90 TABLET | Refills: 2 | Status: SHIPPED | OUTPATIENT
Start: 2023-03-24 | End: 2023-12-06 | Stop reason: SDUPTHER

## 2023-03-31 ENCOUNTER — EXTERNAL CHRONIC CARE MANAGEMENT (OUTPATIENT)
Dept: PRIMARY CARE CLINIC | Facility: CLINIC | Age: 78
End: 2023-03-31
Payer: MEDICARE

## 2023-03-31 PROCEDURE — 99490 PR CHRONIC CARE MGMT, 1ST 20 MIN: ICD-10-PCS | Mod: S$PBB,,, | Performed by: INTERNAL MEDICINE

## 2023-03-31 PROCEDURE — 99490 CHRNC CARE MGMT STAFF 1ST 20: CPT | Mod: S$PBB,,, | Performed by: INTERNAL MEDICINE

## 2023-03-31 PROCEDURE — 99490 CHRNC CARE MGMT STAFF 1ST 20: CPT | Mod: PBBFAC,PO | Performed by: INTERNAL MEDICINE

## 2023-04-24 ENCOUNTER — TELEPHONE (OUTPATIENT)
Dept: SLEEP MEDICINE | Facility: CLINIC | Age: 78
End: 2023-04-24
Payer: MEDICARE

## 2023-04-24 NOTE — TELEPHONE ENCOUNTER
----- Message from Zoya  sent at 4/24/2023  9:56 AM CDT -----  Name of Who is Calling: Swapna on behalf of  AMILCAR GREY JR. [[id number]]              What is the request in detail: Patient requesting a call back to discuss clarification on orders received.              Can the clinic reply by [facility]: No              What Number to Call Back if not in [facility]: [phone] [place]

## 2023-04-30 ENCOUNTER — EXTERNAL CHRONIC CARE MANAGEMENT (OUTPATIENT)
Dept: PRIMARY CARE CLINIC | Facility: CLINIC | Age: 78
End: 2023-04-30
Payer: MEDICARE

## 2023-04-30 PROCEDURE — 99490 CHRNC CARE MGMT STAFF 1ST 20: CPT | Mod: PBBFAC,PO | Performed by: INTERNAL MEDICINE

## 2023-04-30 PROCEDURE — 99490 CHRNC CARE MGMT STAFF 1ST 20: CPT | Mod: S$PBB,,, | Performed by: INTERNAL MEDICINE

## 2023-04-30 PROCEDURE — 99490 PR CHRONIC CARE MGMT, 1ST 20 MIN: ICD-10-PCS | Mod: S$PBB,,, | Performed by: INTERNAL MEDICINE

## 2023-05-15 ENCOUNTER — CLINICAL SUPPORT (OUTPATIENT)
Dept: AUDIOLOGY | Facility: CLINIC | Age: 78
End: 2023-05-15
Payer: MEDICARE

## 2023-05-15 DIAGNOSIS — H90.A32 MIXED CONDUCTIVE AND SENSORINEURAL HEARING LOSS OF LEFT EAR WITH RESTRICTED HEARING OF RIGHT EAR: Primary | ICD-10-CM

## 2023-05-15 PROCEDURE — 99499 NO LOS: ICD-10-PCS | Mod: S$PBB,,, | Performed by: AUDIOLOGIST

## 2023-05-15 PROCEDURE — 99499 UNLISTED E&M SERVICE: CPT | Mod: S$PBB,,, | Performed by: AUDIOLOGIST

## 2023-05-15 NOTE — PROGRESS NOTES
Hearing aid Follow-up:    Mr. Allen was seen today for a hearing aid follow-up appointment. Mr. Allen was seen at the hearing aid window a few weeks ago because his right hearing aid was intermittent. His  was replaced and reported he has not had any issues. Otoscopy revealed cerumen and white, fluffy debris in the left EAC. Mr. Allen has a history of fungal otitis externa. Mr. Allen was referred to ENT for treatment and was counseled to make sure he sanitizes his domes daily. He was given an extra set of domes to replace his with after he is treated by ENT. Mr. Allen's hearing aids were cleaned and checked. Listening check confirmed both hearing aids to be in good working condition after clean and check. Mr. Allen denied wanting any additional adjustments. Mr. Allen will follow-up with R[facility] in 6 months or sooner if needed.     Hearing Aid Information:  [employer] [facility] R [address] LI Rechargeable   RT SN  [id number]   LT SN   [id number]   : P2   Dome: LINDA Singhy Exp 02/03/2026

## 2023-05-26 ENCOUNTER — TELEPHONE (OUTPATIENT)
Dept: FAMILY MEDICINE | Facility: CLINIC | Age: 78
End: 2023-05-26
Payer: MEDICARE

## 2023-05-26 NOTE — TELEPHONE ENCOUNTER
Spoke with the Patient and scheduled an EAWV appointment for 05/29/23 @ 2:00pm  Patient verbally understands.

## 2023-05-29 ENCOUNTER — OFFICE VISIT (OUTPATIENT)
Dept: INTERNAL MEDICINE | Facility: CLINIC | Age: 78
End: 2023-05-29
Payer: MEDICARE

## 2023-05-29 ENCOUNTER — IMMUNIZATION (OUTPATIENT)
Dept: INTERNAL MEDICINE | Facility: CLINIC | Age: 78
End: 2023-05-29
Payer: MEDICARE

## 2023-05-29 VITALS
OXYGEN SATURATION: 95 % | WEIGHT: 232.38 LBS | BODY MASS INDEX: 33.27 KG/M2 | HEART RATE: 66 BPM | SYSTOLIC BLOOD PRESSURE: 124 MMHG | HEIGHT: 70 IN | DIASTOLIC BLOOD PRESSURE: 84 MMHG

## 2023-05-29 DIAGNOSIS — G47.33 OSA (OBSTRUCTIVE SLEEP APNEA): ICD-10-CM

## 2023-05-29 DIAGNOSIS — G25.81 RESTLESS LEG SYNDROME: ICD-10-CM

## 2023-05-29 DIAGNOSIS — E78.5 DYSLIPIDEMIA: ICD-10-CM

## 2023-05-29 DIAGNOSIS — J43.2 CENTRILOBULAR EMPHYSEMA: Chronic | ICD-10-CM

## 2023-05-29 DIAGNOSIS — Z23 NEED FOR VACCINATION: Primary | ICD-10-CM

## 2023-05-29 DIAGNOSIS — Z00.00 ENCOUNTER FOR PREVENTIVE HEALTH EXAMINATION: Primary | ICD-10-CM

## 2023-05-29 DIAGNOSIS — K21.9 GASTROESOPHAGEAL REFLUX DISEASE, UNSPECIFIED WHETHER ESOPHAGITIS PRESENT: ICD-10-CM

## 2023-05-29 DIAGNOSIS — I70.0 AORTIC ATHEROSCLEROSIS: ICD-10-CM

## 2023-05-29 DIAGNOSIS — I25.10 CORONARY ARTERY DISEASE INVOLVING NATIVE CORONARY ARTERY OF NATIVE HEART WITHOUT ANGINA PECTORIS: Chronic | ICD-10-CM

## 2023-05-29 DIAGNOSIS — I10 HTN (HYPERTENSION), BENIGN: ICD-10-CM

## 2023-05-29 DIAGNOSIS — E66.09 CLASS 1 OBESITY DUE TO EXCESS CALORIES WITH SERIOUS COMORBIDITY AND BODY MASS INDEX (BMI) OF 33.0 TO 33.9 IN ADULT: ICD-10-CM

## 2023-05-29 PROBLEM — Z86.010 PERSONAL HISTORY OF COLONIC POLYPS: Status: RESOLVED | Noted: 2019-04-12 | Resolved: 2023-05-29

## 2023-05-29 PROBLEM — Z86.0100 PERSONAL HISTORY OF COLONIC POLYPS: Status: RESOLVED | Noted: 2019-04-12 | Resolved: 2023-05-29

## 2023-05-29 PROCEDURE — G0439 PPPS, SUBSEQ VISIT: HCPCS | Mod: ,,, | Performed by: NURSE PRACTITIONER

## 2023-05-29 PROCEDURE — 0124A COVID-19, MRNA, LNP-S, BIVALENT BOOSTER, PF, 30 MCG/0.3 ML DOSE: CPT | Mod: PBBFAC,CV19

## 2023-05-29 PROCEDURE — 99999 PR PBB SHADOW E&M-EST. PATIENT-LVL V: CPT | Mod: PBBFAC,,, | Performed by: NURSE PRACTITIONER

## 2023-05-29 PROCEDURE — G0439 PR MEDICARE ANNUAL WELLNESS SUBSEQUENT VISIT: ICD-10-PCS | Mod: ,,, | Performed by: NURSE PRACTITIONER

## 2023-05-29 PROCEDURE — 91312 COVID-19, MRNA, LNP-S, BIVALENT BOOSTER, PF, 30 MCG/0.3 ML DOSE: CPT | Mod: PBBFAC

## 2023-05-29 PROCEDURE — 99215 OFFICE O/P EST HI 40 MIN: CPT | Mod: PBBFAC | Performed by: NURSE PRACTITIONER

## 2023-05-29 PROCEDURE — 99999 PR PBB SHADOW E&M-EST. PATIENT-LVL V: ICD-10-PCS | Mod: PBBFAC,,, | Performed by: NURSE PRACTITIONER

## 2023-05-29 NOTE — PROGRESS NOTES
"  Jordin Allen presented for a  Medicare AWV and comprehensive Health Risk Assessment today. The following components were reviewed and updated:    Medical history  Family History  Social history  Allergies and Current Medications  Health Risk Assessment  Health Maintenance  Care Team         ** See Completed Assessments for Annual Wellness Visit within the encounter summary.**         The following assessments were completed:  Living Situation  CAGE  Depression Screening  Timed Get Up and Go  Whisper Test  Cognitive Function Screening      Nutrition Screening  ADL Screening  PAQ Screening  OPIOID Screening: Patient does not have a prescription for narcotics. Patient does not use substance         Vitals:    05/29/23 1353 05/29/23 1415   BP:  124/84   Pulse:  66   SpO2:  95%   Weight: 105.4 kg (232 lb 5.8 oz)    Height: 5' 10" (1.778 m)      Body mass index is 33.34 kg/m².  Physical Exam  Vitals and nursing note reviewed.   Constitutional:       Appearance: He is well-developed. He is obese.   HENT:      Head: Normocephalic.   Cardiovascular:      Rate and Rhythm: Normal rate and regular rhythm.      Heart sounds: Normal heart sounds. No murmur heard.  Pulmonary:      Effort: Pulmonary effort is normal.      Breath sounds: Normal breath sounds.   Abdominal:      General: Bowel sounds are normal.      Palpations: Abdomen is soft.   Musculoskeletal:         General: Normal range of motion.   Skin:     General: Skin is warm and dry.   Neurological:      Mental Status: He is alert and oriented to person, place, and time.      Motor: No abnormal muscle tone.   Psychiatric:         Mood and Affect: Mood normal.             Diagnoses and health risks identified today and associated recommendations/orders:    1. Encounter for preventive health examination  Here for Health Risk Assessment/Annual Wellness Visit.  Health maintenance reviewed and updated. Follow up in one year.   He is unsure if he obtained 2nd Shingrix - he " will check with pharmacy.    2. HTN (hypertension), benign  Chronic, stable on current medications. Followed by PCP, Cardiology.    3. Aortic atherosclerosis  Chronic, stable on current medications. Noted CT Chest/Lung 12 /14/21. Followed by PCP, Cardiology.    4. Coronary artery disease involving native coronary artery of native heart without angina pectoris  Chronic, stable on current medications. Followed by PCP, Cardiology.    5. Dyslipidemia  Chronic, stable on current medication. Followed by PCP, Cardiology.    6. Centrilobular emphysema  Chronic, stable on current medications. Followed by PCP.     7. Restless leg syndrome  Chronic, stable on current medication. Followed by PCP.    8. Gastroesophageal reflux disease, unspecified whether esophagitis present  Chronic, stable on current medication. Followed by PCP.    9. SHOLA (obstructive sleep apnea)  Chronic, stable with CPAP. Followed by PCP, Sleep Medicine.     10. Class 1 obesity due to excess calories with serious comorbidity and body mass index (BMI) of 33.0 to 33.9 in adult  Chronic, weight decreased 6 pounds from PCP visit 12/2022. Followed by PCP.      Provided Jordin with a 5-10 year written screening schedule and personal prevention plan. Recommendations were developed using the USPSTF age appropriate recommendations. Education, counseling, and referrals were provided as needed. After Visit Summary printed and given to patient which includes a list of additional screenings\tests needed.    Follow up in about 7 months (around 12/16/2023).with PCP    Yohana Myrick NP

## 2023-05-29 NOTE — PATIENT INSTRUCTIONS
Counseling and Referral of Other Preventative  (Italic type indicates deductible and co-insurance are waived)    Patient Name: Jordin Allen  Today's Date: 5/29/2023    Health Maintenance       Date Due Completion Date    Shingles Vaccine (2 of 2) 04/16/2020 2/20/2020 (Done)    Override on 2/20/2020: Done    Override on 2/20/2020: Done    LDCT Lung Screen 12/14/2022 12/14/2021    COVID-19 Vaccine (5 - Pfizer series) 02/20/2023 10/20/2022    High Dose Statin 05/29/2024 5/29/2023    Colorectal Cancer Screening 05/31/2025 5/31/2022    Lipid Panel 12/16/2027 12/16/2022    TETANUS VACCINE 11/13/2029 11/13/2019        No orders of the defined types were placed in this encounter.      The following information is provided to all patients.  This information is to help you find resources for any of the problems found today that may be affecting your health:                Living healthy guide: www.[facility].[place].[facility]      Understanding Diabetes: www.diabetes.org      Eating healthy: www.cdc.gov/healthyweight      [facility] home safety checklist: www.cdc.gov/steadi/patient.html      Agency on Aging: www.goea.[place].gov      Alcoholics anonymous (AA): www.aa.org      Physical Activity: www.linda.nih.gov/hx3lttu      Tobacco use: www.quitwithusla.org     Counseling and Referral of Other Preventative  (Italic type indicates deductible and co-insurance are waived)    Patient Name: Jordin Allen  Today's Date: 5/29/2023    Health Maintenance       Date Due Completion Date    Shingles Vaccine (2 of 2) 04/16/2020 2/20/2020 (Done)    Override on 2/20/2020: Done    Override on 2/20/2020: Done    LDCT Lung Screen 12/14/2022 12/14/2021    High Dose Statin 05/29/2024 5/29/2023    Colorectal Cancer Screening 05/31/2025 5/31/2022    Lipid Panel 12/16/2027 12/16/2022    TETANUS VACCINE 11/13/2029 11/13/2019        No orders of the defined types were placed in this encounter.      The following information is provided to all patients.  This  information is to help you find resources for any of the problems found today that may be affecting your health:                Living healthy guide: www.[facility].[place].[facility]      Understanding Diabetes: www.diabetes.org      Eating healthy: www.cdc.gov/healthyweight      CDC home safety checklist: www.cdc.gov/steadi/patient.html      Agency on Aging: www.goea.[place].[facility]      Alcoholics anonymous (AA): www.aa.org      Physical Activity: www.linda.nih.gov/yc1died      Tobacco use: www.quitwithusla.org

## 2023-05-31 ENCOUNTER — EXTERNAL CHRONIC CARE MANAGEMENT (OUTPATIENT)
Dept: PRIMARY CARE CLINIC | Facility: CLINIC | Age: 78
End: 2023-05-31
Payer: MEDICARE

## 2023-05-31 PROCEDURE — 99490 CHRNC CARE MGMT STAFF 1ST 20: CPT | Mod: S$PBB,,, | Performed by: INTERNAL MEDICINE

## 2023-05-31 PROCEDURE — 99490 CHRNC CARE MGMT STAFF 1ST 20: CPT | Mod: PBBFAC,PO | Performed by: INTERNAL MEDICINE

## 2023-05-31 PROCEDURE — 99490 PR CHRONIC CARE MGMT, 1ST 20 MIN: ICD-10-PCS | Mod: S$PBB,,, | Performed by: INTERNAL MEDICINE

## 2023-06-02 ENCOUNTER — HOSPITAL ENCOUNTER (OUTPATIENT)
Dept: RADIOLOGY | Facility: HOSPITAL | Age: 78
Discharge: HOME | End: 2023-06-02
Attending: INTERNAL MEDICINE
Payer: MEDICARE

## 2023-06-02 DIAGNOSIS — Z87.891 PERSONAL HISTORY OF NICOTINE DEPENDENCE: ICD-10-CM

## 2023-06-02 DIAGNOSIS — F17.200 TOBACCO USE DISORDER: ICD-10-CM

## 2023-06-02 PROCEDURE — 71271 CT CHEST LUNG SCREENING LOW DOSE: ICD-10-PCS | Mod: 26,,, | Performed by: RADIOLOGY

## 2023-06-02 PROCEDURE — 71271 CT THORAX LUNG CANCER SCR C-: CPT | Mod: 26,,, | Performed by: RADIOLOGY

## 2023-06-02 PROCEDURE — 71271 CT THORAX LUNG CANCER SCR C-: CPT | Mod: TC

## 2023-06-19 ENCOUNTER — CLINICAL SUPPORT (OUTPATIENT)
Dept: AUDIOLOGY | Facility: CLINIC | Age: 78
End: 2023-06-19
Payer: MEDICARE

## 2023-06-19 ENCOUNTER — OFFICE VISIT (OUTPATIENT)
Dept: OTOLARYNGOLOGY | Facility: CLINIC | Age: 78
End: 2023-06-19
Payer: MEDICARE

## 2023-06-19 DIAGNOSIS — B36.9 OTITIS EXTERNA, FUNGAL, LEFT EAR: ICD-10-CM

## 2023-06-19 DIAGNOSIS — H65.492 CHRONIC MEE (MIDDLE EAR EFFUSION), LEFT: Primary | ICD-10-CM

## 2023-06-19 DIAGNOSIS — Z97.4 WEARS HEARING AID IN BOTH EARS: ICD-10-CM

## 2023-06-19 DIAGNOSIS — H61.22 LEFT EAR IMPACTED CERUMEN: ICD-10-CM

## 2023-06-19 DIAGNOSIS — H62.42 OTITIS EXTERNA, FUNGAL, LEFT EAR: ICD-10-CM

## 2023-06-19 DIAGNOSIS — H69.92 DYSFUNCTION OF LEFT EUSTACHIAN TUBE: Primary | ICD-10-CM

## 2023-06-19 PROCEDURE — 99999 PR PBB SHADOW E&M-EST. PATIENT-LVL III: CPT | Mod: PBBFAC,,,

## 2023-06-19 PROCEDURE — 99214 OFFICE O/P EST MOD 30 MIN: CPT | Mod: 25,S$PBB,,

## 2023-06-19 PROCEDURE — 99214 PR OFFICE/OUTPT VISIT, EST, LEVL IV, 30-39 MIN: ICD-10-PCS | Mod: 25,S$PBB,,

## 2023-06-19 PROCEDURE — 69210 REMOVE IMPACTED EAR WAX UNI: CPT | Mod: S$PBB,,,

## 2023-06-19 PROCEDURE — 99213 OFFICE O/P EST LOW 20 MIN: CPT | Mod: PBBFAC

## 2023-06-19 PROCEDURE — 99999 PR PBB SHADOW E&M-EST. PATIENT-LVL III: ICD-10-PCS | Mod: PBBFAC,,,

## 2023-06-19 PROCEDURE — 69210 REMOVE IMPACTED EAR WAX UNI: CPT | Mod: PBBFAC

## 2023-06-19 PROCEDURE — 92567 TYMPANOMETRY: CPT | Mod: PBBFAC

## 2023-06-19 PROCEDURE — 69210 PR REMOVAL IMPACTED CERUMEN REQUIRING INSTRUMENTATION, UNILATERAL: ICD-10-PCS | Mod: S$PBB,,,

## 2023-06-19 RX ORDER — PEN NEEDLE, DIABETIC 31 GX5/16"
NEEDLE, DISPOSABLE MISCELLANEOUS
Status: ON HOLD | COMMUNITY
Start: 2023-04-27 | End: 2024-03-22 | Stop reason: HOSPADM

## 2023-06-19 RX ORDER — AMOXICILLIN AND CLAVULANATE POTASSIUM 875; 125 MG/1; MG/1
1 TABLET, FILM COATED ORAL 2 TIMES DAILY
Qty: 20 TABLET | Refills: 0 | Status: SHIPPED | OUTPATIENT
Start: 2023-06-19 | End: 2023-06-29

## 2023-06-19 NOTE — PROGRESS NOTES
Subjective:       Patient ID: Jordin Allen Jr. is a 77 y.o. male.    Chief Complaint: Cerumen Impaction    HPI  Mr. Allen is a 77 year old male , here today with complaints of left ear cerumen impaction. He saw Dr. Trejo in 2/2023 for similar problem. He reports on/off itching in bilateral ears and uses clotrimazole as needed with relief. He reports history of bilateral PE tubes in the past.He denies any otalgia, otorrhea, ear fullness/pressure, tinnitus or vertigo.  He reports hearing loss in L>R ear. He reports difficult breathing from his nose when he lays down at night and uses Flonase. He reports sleep apnea.    Past Medical History:   Diagnosis Date    Benign neoplasm of colon 10/01/2013    Bronchitis chronic     CAD (coronary artery disease) 10/31/2013    COPD (chronic obstructive pulmonary disease)     Emphysema of lung     GERD (gastroesophageal reflux disease)     Hx of colonic polyps     Hypertension     NAFLD (nonalcoholic fatty liver disease) 03/27/2017    SHOLA on CPAP     RLS (restless legs syndrome)     Screen for colon cancer 08/30/2013     Past Surgical History:   Procedure Laterality Date    bilateral foot surgery  1993    CARDIAC CATHETERIZATION  2013    x1    CATARACT EXTRACTION, BILATERAL      COLON SURGERY  2013    Ace Mott MD    COLONOSCOPY N/A 3/21/2018    Procedure: COLONOSCOPY;  Surgeon: Terry Mott MD;  Location: [facility] ([address] [place] [address]);  Service: Endoscopy;  Laterality: N/A;    COLONOSCOPY N/A 4/12/2019    Procedure: COLONOSCOPY;  Surgeon: John Mantilla MD;  Location: [address]);  Service: Endoscopy;  Laterality: N/A;    COLONOSCOPY N/A 5/31/2022    Procedure: COLONOSCOPY;  Surgeon: MEDINA Farris MD;  Location: [facility] ([address] [place] [address]);  Service: Endoscopy;  Laterality: N/A;  fully vacc-inst portal-tb    scrotal abscess removal       Family History   Problem Relation Age of Onset    Heart disease Mother     Heart attack Mother     Hypertension Mother     No Known  Problems Sister     No Known Problems Daughter     Asthma Neg Hx     Emphysema Neg Hx     Prostate cancer Neg Hx     Cirrhosis Neg Hx      Social History  Social History     Tobacco Use    Smoking status: Former     Packs/day: 1.00     Years: 40.00     Pack years: 40.00     Types: Cigarettes     Quit date: 8/15/2012     Years since quitting: 10.8     Passive exposure: Never    Smokeless tobacco: Never   Substance Use Topics    Alcohol use: Yes     Alcohol/week: 3.0 standard drinks     Types: 3 Cans of beer per week     Comment: maybe 2-3  beers weekly    Drug use: No       Review of Systems     Constitutional: Negative for appetite change, chills, fatigue, fever and unexpected weight loss.      HENT: Positive for ear infection and hearing loss.      Eyes:  Negative for change in eyesight, eye drainage, eye itching and photophobia.     Respiratory:  Positive for shortness of breath and sleep apnea.      Cardiovascular:  Negative for chest pain, foot swelling, irregular heartbeat and swollen veins.     Gastrointestinal:  Negative for abdominal pain, acid reflux, constipation, diarrhea, heartburn and vomiting.     Genitourinary: Negative for difficulty urinating, sexual problems and frequent urination.     Musc: Negative for aching joints, aching muscles, back pain and neck pain.     Skin: Negative for rash.     Allergy: Negative for food allergies and seasonal allergies.     Endocrine: Negative for cold intolerance and heat intolerance.      Neurological: Negative for dizziness, headaches, light-headedness, seizures and tremors.      Hematologic: Negative for bruises/bleeds easily and swollen glands.      Psychiatric: Negative for decreased concentration, depression, nervous/anxious and sleep disturbance.              Objective:      Physical Exam  Vitals reviewed.   Constitutional:       General: He is not in acute distress.     Appearance: Normal appearance. He is not ill-appearing.   HENT:      Head: Normocephalic  and atraumatic. No right periorbital erythema or left periorbital erythema.      Jaw: No trismus, tenderness, swelling, pain on movement or malocclusion.      Salivary Glands: Right salivary gland is not diffusely enlarged or tender. Left salivary gland is not diffusely enlarged or tender.      Right Ear: Hearing, tympanic membrane, ear canal and external ear normal. No decreased hearing noted. No laceration, drainage, swelling or tenderness. No middle ear effusion. There is no impacted cerumen. No foreign body. No mastoid tenderness. No PE tube. No hemotympanum. Tympanic membrane is not injected, scarred, perforated, erythematous, retracted or bulging. Tympanic membrane has normal mobility.      Left Ear: Hearing and external ear normal. No decreased hearing noted. No laceration, drainage, swelling or tenderness. A middle ear effusion (floyd purulence noted under microscopy. Typanogram type B done in clinic today to confirm. No large ECV volume.) is present. There is impacted cerumen (sporous debris noted on dry cerumen in EAC. EAC without any drainage undermicroscopy.). No foreign body. No mastoid tenderness. No PE tube. No hemotympanum. Tympanic membrane is not injected, scarred, perforated, erythematous, retracted or bulging. Tympanic membrane has decreased mobility.      Nose: No nasal deformity or signs of injury.      Mouth/Throat:      Lips: Pink.   Pulmonary:      Effort: Pulmonary effort is normal. No respiratory distress.   Musculoskeletal:      Cervical back: Normal range of motion and neck supple. No edema or erythema.   Lymphadenopathy:      Cervical: No cervical adenopathy.   Neurological:      Mental Status: He is alert and oriented to person, place, and time.   Psychiatric:         Attention and Perception: Attention normal.         Mood and Affect: Mood normal.         Speech: Speech normal.         Behavior: Behavior normal. Behavior is cooperative.         Procedure Note:    The patient was  brought to the minor procedure room and placed under the operating microscope. Using a combination of suction, curettes and cup forceps the patient's dry cerumen with white spores cerumen impaction was removed left EAC. The tympanic membrane was evaluated and was unremarkable. The patient tolerated the procedure well. There were no complications.  Assessment:       1. Chronic ESPERANZA (middle ear effusion), left    2. Wears hearing aid in both ears    3. Left ear impacted cerumen    4. Otitis externa, fungal, left ear        Plan:     Jordin was seen today for cerumen impaction.    Diagnoses and all orders for this visit:    Chronic ESPERANZA (middle ear effusion), left  -     amoxicillin-clavulanate 875-125mg (AUGMENTIN) 875-125 mg per tablet; Take 1 tablet by mouth 2 (two) times daily. for 10 days  Offered antibiotic this visit secondary to length of effusion since his last visit with Dr. Trejo in 2/2023/  Wears hearing aid in both ears    Left ear impacted cerumen  Left cerumen impaction removed under microscopy.  Patient tolerated procedure well and noted improvement upon impaction removal.  Otoscopic exam benign.  Education about normal ear hygiene and the avoidance of Q-tips was reviewed.     Otitis externa, fungal, left ear    -     Left ear exam with spore cerumen. EAC without any erythema or swelling noted under microscopy.         -     Dry ear precautions discussed/no instrumentation in the ear        -     Continue using Clotrimazole as needed for ear itch .         RTC as needed or if symptoms persist.  Questions answered.

## 2023-06-19 NOTE — PROGRESS NOTES
Mr. Jordin Schwartz was seen today for tympanometry only per ORACIO Nieto.    Tympanometry revealed Type A in the right ear and Type B with normal ECV in the left ear.    Recommend:  Otologic evaluation  Annual audiogram  Hearing protection in noise

## 2023-06-29 DIAGNOSIS — J06.9 UPPER RESPIRATORY TRACT INFECTION, UNSPECIFIED TYPE: ICD-10-CM

## 2023-06-30 ENCOUNTER — EXTERNAL CHRONIC CARE MANAGEMENT (OUTPATIENT)
Dept: PRIMARY CARE CLINIC | Facility: CLINIC | Age: 78
End: 2023-06-30
Payer: MEDICARE

## 2023-06-30 PROCEDURE — 99490 PR CHRONIC CARE MGMT, 1ST 20 MIN: ICD-10-PCS | Mod: S$PBB,,, | Performed by: INTERNAL MEDICINE

## 2023-06-30 PROCEDURE — 99439 CHRNC CARE MGMT STAF EA ADDL: CPT | Mod: PBBFAC,PO | Performed by: INTERNAL MEDICINE

## 2023-06-30 PROCEDURE — 99439 PR CHRONIC CARE MGMT, EA ADDTL 20 MIN: ICD-10-PCS | Mod: S$PBB,,, | Performed by: INTERNAL MEDICINE

## 2023-06-30 PROCEDURE — 99439 CHRNC CARE MGMT STAF EA ADDL: CPT | Mod: S$PBB,,, | Performed by: INTERNAL MEDICINE

## 2023-06-30 PROCEDURE — 99490 CHRNC CARE MGMT STAFF 1ST 20: CPT | Mod: S$PBB,,, | Performed by: INTERNAL MEDICINE

## 2023-06-30 PROCEDURE — 99490 CHRNC CARE MGMT STAFF 1ST 20: CPT | Mod: PBBFAC,PO | Performed by: INTERNAL MEDICINE

## 2023-06-30 RX ORDER — FLUTICASONE PROPIONATE 50 MCG
SPRAY, SUSPENSION (ML) NASAL
Qty: 16 G | Refills: 11 | Status: SHIPPED | OUTPATIENT
Start: 2023-06-30 | End: 2023-11-16 | Stop reason: SDUPTHER

## 2023-07-10 RX ORDER — ATORVASTATIN CALCIUM 80 MG/1
TABLET, FILM COATED ORAL
Qty: 90 TABLET | Refills: 3 | Status: SHIPPED | OUTPATIENT
Start: 2023-07-10 | End: 2024-03-18 | Stop reason: SDUPTHER

## 2023-07-31 ENCOUNTER — EXTERNAL CHRONIC CARE MANAGEMENT (OUTPATIENT)
Dept: PRIMARY CARE CLINIC | Facility: CLINIC | Age: 78
End: 2023-07-31
Payer: MEDICARE

## 2023-07-31 PROCEDURE — 99490 CHRNC CARE MGMT STAFF 1ST 20: CPT | Mod: S$PBB,,, | Performed by: INTERNAL MEDICINE

## 2023-07-31 PROCEDURE — 99490 CHRNC CARE MGMT STAFF 1ST 20: CPT | Mod: PBBFAC,PO | Performed by: INTERNAL MEDICINE

## 2023-07-31 PROCEDURE — 99490 PR CHRONIC CARE MGMT, 1ST 20 MIN: ICD-10-PCS | Mod: S$PBB,,, | Performed by: INTERNAL MEDICINE

## 2023-08-23 ENCOUNTER — OFFICE VISIT (OUTPATIENT)
Dept: OTOLARYNGOLOGY | Facility: CLINIC | Age: 78
End: 2023-08-23
Payer: MEDICARE

## 2023-08-23 ENCOUNTER — CLINICAL SUPPORT (OUTPATIENT)
Dept: AUDIOLOGY | Facility: CLINIC | Age: 78
End: 2023-08-23
Payer: MEDICARE

## 2023-08-23 DIAGNOSIS — R05.9 COUGH, UNSPECIFIED TYPE: ICD-10-CM

## 2023-08-23 DIAGNOSIS — J30.9 ALLERGIC RHINITIS, UNSPECIFIED SEASONALITY, UNSPECIFIED TRIGGER: ICD-10-CM

## 2023-08-23 DIAGNOSIS — J34.3 HYPERTROPHY OF INFERIOR NASAL TURBINATE: Primary | ICD-10-CM

## 2023-08-23 DIAGNOSIS — Z97.4 WEARS HEARING AID IN BOTH EARS: ICD-10-CM

## 2023-08-23 DIAGNOSIS — G47.33 OSA (OBSTRUCTIVE SLEEP APNEA): ICD-10-CM

## 2023-08-23 DIAGNOSIS — H65.492 CHRONIC MEE (MIDDLE EAR EFFUSION), LEFT: ICD-10-CM

## 2023-08-23 DIAGNOSIS — K21.9 GASTROESOPHAGEAL REFLUX DISEASE WITHOUT ESOPHAGITIS: ICD-10-CM

## 2023-08-23 DIAGNOSIS — H69.93 DYSFUNCTION OF BOTH EUSTACHIAN TUBES: Primary | ICD-10-CM

## 2023-08-23 DIAGNOSIS — H60.8X2 CHRONIC ECZEMATOID OTITIS EXTERNA OF LEFT EAR: ICD-10-CM

## 2023-08-23 DIAGNOSIS — H61.22 LEFT EAR IMPACTED CERUMEN: ICD-10-CM

## 2023-08-23 DIAGNOSIS — H69.93 EUSTACHIAN TUBE DYSFUNCTION, BILATERAL: ICD-10-CM

## 2023-08-23 PROCEDURE — 92504 EAR MICROSCOPY EXAMINATION: CPT | Mod: PBBFAC

## 2023-08-23 PROCEDURE — 99214 OFFICE O/P EST MOD 30 MIN: CPT | Mod: PBBFAC

## 2023-08-23 PROCEDURE — 99999 PR PBB SHADOW E&M-EST. PATIENT-LVL IV: ICD-10-PCS | Mod: PBBFAC,,,

## 2023-08-23 PROCEDURE — 99214 OFFICE O/P EST MOD 30 MIN: CPT | Mod: 25,S$PBB,,

## 2023-08-23 PROCEDURE — 92567 TYMPANOMETRY: CPT | Mod: PBBFAC | Performed by: AUDIOLOGIST

## 2023-08-23 PROCEDURE — 69210 REMOVE IMPACTED EAR WAX UNI: CPT | Mod: S$PBB,,,

## 2023-08-23 PROCEDURE — 99999 PR PBB SHADOW E&M-EST. PATIENT-LVL IV: CPT | Mod: PBBFAC,,,

## 2023-08-23 PROCEDURE — 69210 PR REMOVAL IMPACTED CERUMEN REQUIRING INSTRUMENTATION, UNILATERAL: ICD-10-PCS | Mod: S$PBB,,,

## 2023-08-23 PROCEDURE — 99214 PR OFFICE/OUTPT VISIT, EST, LEVL IV, 30-39 MIN: ICD-10-PCS | Mod: 25,S$PBB,,

## 2023-08-23 PROCEDURE — 69210 REMOVE IMPACTED EAR WAX UNI: CPT | Mod: PBBFAC

## 2023-08-23 RX ORDER — HYDROCORTISONE AND ACETIC ACID 20.75; 10.375 MG/ML; MG/ML
3 SOLUTION AURICULAR (OTIC) 2 TIMES DAILY
Qty: 10 ML | Refills: 0 | Status: SHIPPED | OUTPATIENT
Start: 2023-08-23 | End: 2023-09-02

## 2023-08-23 NOTE — PROGRESS NOTES
Subjective:   Jordin is a 77 y.o. male who presents for 2 month follow-up for left ear pain. He reports  having 5/10 otalgia this morning with pressure sensation and can hear his heartbeat. He reports having COPD and taking steroids every other day. He reports decreased hearing in left ear. He reports having x2 sets of PE tubes in the past. He reports acute cough and using Mucinex with some mild relief. He reports history of nasal allergies and uses Flonase at night and wears CPAP machine. He has ila of GERD. He has a history of smoking years ago: quit 2012.     Note from previous visit on 6/19/2023:  HPI  Mr. Allen is a 77 year old male , here today with complaints of left ear cerumen impaction. He saw Dr. Trejo in 2/2023 for similar problem. He reports on/off itching in bilateral ears and uses clotrimazole as needed with relief. He reports history of bilateral PE tubes in the past.He denies any otalgia, otorrhea, ear fullness/pressure, tinnitus or vertigo.  He reports hearing loss in L>R ear. He reports difficult breathing from his nose when he lays down at night and uses Flonase. He reports sleep apnea.   The patient's medications, allergies, past medical, surgical, social and family histories were reviewed and updated as appropriate.  Chronic ESPERANZA (middle ear effusion), left  -     amoxicillin-clavulanate 875-125mg (AUGMENTIN) 875-125 mg per tablet; Take 1 tablet by mouth 2 (two) times daily. for 10 days  Offered antibiotic this visit secondary to length of effusion since his last visit with Dr. Trejo in 2/2023/  Wears hearing aid in both ears     Left ear impacted cerumen  Left cerumen impaction removed under microscopy.  Patient tolerated procedure well and noted improvement upon impaction removal.  Otoscopic exam benign.  Education about normal ear hygiene and the avoidance of Q-tips was reviewed.     Otitis externa, fungal, left ear    -     Left ear exam with spore cerumen. EAC without any erythema or  swelling noted under microscopy.         -     Dry ear precautions discussed/no instrumentation in the ear        -     Continue using Clotrimazole as needed for ear itch .      A detailed review of systems was obtained with pertinent positives as per the above HPI, and otherwise negative.   Objective:     Constitutional:   He is oriented to person, place, and time. He appears well-developed and well-nourished. He appears alert. He is cooperative.  Non-toxic appearance. He does not have a sickly appearance. He does not appear ill. Normal speech.      Head:  Normocephalic and atraumatic. Head is without right periorbital erythema, without left periorbital erythema and without TMJ tenderness. Salivary glands normal.  Facial strength is normal.      Ears:    Right Ear: No lacerations. No drainage, swelling or tenderness. No foreign bodies. No mastoid tenderness. Tympanic membrane is not injected, not scarred, not perforated, not erythematous, not retracted and not bulging. Tympanic membrane mobility is normal. No middle ear effusion. No PE tube. No hemotympanum. Decreased hearing is noted.   Left Ear: No lacerations. There is swelling (left ear canal with wet ear cerumen. Left EAC edemeatous and erythema noted. Wet appearance/stenoised. No draiange .hypahe or spores noted.). No drainage or tenderness. No foreign bodies. No mastoid tenderness. Tympanic membrane is bulging. Tympanic membrane is not injected, not scarred, not perforated, not erythematous and not retracted. Tympanic membrane mobility is normal. A middle ear effusion is present.  No PE tube. No hemotympanum. Decreased hearing is noted.   Ears:    Negative pre/post auricular pain.    Nose:  Septal deviation (to right) present. No mucosal edema, rhinorrhea or sinus tenderness. No epistaxis. Turbinate hypertrophy.  Turbinates normal and no turbinate masses.  Right sinus exhibits no maxillary sinus tenderness and no frontal sinus tenderness. Left sinus exhibits  no maxillary sinus tenderness and no frontal sinus tenderness.     Mouth/Throat  Oropharynx clear and moist without lesions or asymmetry and normal uvula midline. He has dentures. No uvula swelling, oral lesions, trismus or mucous membrane lesions. No oropharyngeal exudate, posterior oropharyngeal edema or posterior oropharyngeal erythema.     Neck:  Trachea normal, phonation normal and no adenopathy. Thyroid tenderness is present. No edema, no erythema and no neck rigidity present. No thyroid mass and no thyromegaly present.     He has no cervical adenopathy.     Pulmonary/Chest:   Effort normal.     Psychiatric:   He has a normal mood and affect. His speech is normal and behavior is normal.     Neurological:   He is alert and oriented to person, place, and time. He has neurological normal, alert and oriented.         Audiogram    Tympanograms  tracings independently reviewed and discussed with patient . Type B left ear, and Type C right ear .                  Procedure Note:    The patient was brought to the minor procedure room and placed under the operating microscope. Using a combination of suction, curettes and cup forceps the patient's cerumen impaction was remove left EAC. The tympanic membrane was evaluated and was unremarkable. The patient tolerated the procedure well. There were no complications.    Nasopharyngoscopy    Indication:  Eustachian tube dysfunction    Fiberoptic nasopharyngoscopy was performed to evaluate the nasopharynx.  Informed consent was obtained prior to proceeding.  The nasal cavity was decongested with topical 1% phenylephrine and anesthetized with 4% lidocaine.  A flexible fiberoptic endoscopic was introduced and advanced through the nasal cavity to the nasopharynx.    Findings:    Mucosa:  severe mucosal edema   moderate erythema   Exudate:   clear  exudate   Adenoids:  minimal   Eustachian tubes:  obstructed clear mucous.      The patient tolerated the procedure well.      Flexible  Fiberoptic Laryngoscopy # [id number]    Indication:  recurrent left side effusion     Flexible fiberoptic laryngoscopy was performed to further evaluate the larynx, nasopharynx and hypopharynx.  He did not tolerate mirror examination.  Informed consent was obtained prior to proceeding.  The nasal cavity was decongested with topical 1% phenylephrine and anesthetized with 4% lidocaine.  A flexible fiberoptic laryngoscope was then advanced into the patients nasal cavity.    Findings:      Nasopharynx:  no nasopharyngeal mass   Base of Tongue:  minimal lingual tonsils   Hypopharynx:  moderate post-cricoid edema   no hypopharyngeal mass   Larynx:  normal epiglottis   normal false vocal folds     normal true vocal folds   normal vocal fold mobility   moderate arytenoid erythema   no laryngeal mass   No polyps or masses noted.     The patient tolerated the procedure well.                                            Assessment:     1. Hypertrophy of inferior nasal turbinate    2. Chronic ESPERANZA (middle ear effusion), left    3. Cough, unspecified type    4. Chronic eczematoid otitis externa of left ear    5. Rhinitis, nonallergic    6. Wears hearing aid in both ears    7. Gastroesophageal reflux disease without esophagitis    8. SHOLA (obstructive sleep apnea)    9. Left ear impacted cerumen      Plan:   Jordin was seen today for otalgia.    Diagnoses and all orders for this visit:    Hypertrophy of inferior nasal turbinate    Chronic ESPERANZA (middle ear effusion), left  Gentle intermittent auto-insufflation (twice a day) may be helpful in relieving symptoms until the effusion resolves. This can be done by pinching the nose while gently exhaling through the nose, forcing air back through the Eustachian tube and thus repressurizing the ear.  2.   Daily short-term treatment with antihistamines (Claritin, Zyrtec, Xyzal ) and/or   3.   nasal corticosteroids (FLONASE, NASONEX). Spray away from nasal septum;    spray outwards  4.    Nasal  saline spray twice a day ( use this 30 minutes before nasal steroids)  5.    May have fluid up to 3 months.  Afrin 3 for days and then stop.   Cough, unspecified type  Continue Mucinex as needed to thin mucous production and increase hydration.PAtient has history of COPD  Chronic eczematoid otitis externa of left ear  -     acetic acid-hydrocortisone (VOSOL-HC) otic solution; Place 3 drops into the left ear 2 (two) times daily. for 10 days    -     Left ear exam with drainage, swelling, and erythema        -     Dry ear precautions discussed/no instrumentation in the ear        -     Stop Clotrimazole ear drops.  Rhinitis, allergic  Antihistamines (Claritin, Zyrtec, Xyzal ) and/or      Nasal corticosteroids (FLONASE, NASONEX). Spray away from nasal septum;    spray outwards  Nasal saline spray twice a day ( use this 30 minutes before nasal steroids)  Recommended daily saline rinses 2x day before nasal spray. Instruction provided.  Wears hearing aid in both ears    Gastroesophageal reflux disease without esophagitis    SHOLA (obstructive sleep apnea)    Left ear impacted cerumen  Left cerumen impaction removed under microscopy.  Patient tolerated procedure well and noted improvement upon impaction removal.  Otoscopic exam benign.  Education about normal ear hygiene and the avoidance of Q-tips was reviewed.     Eustachian tube dysfunction, bilateral  .Discussed the anatomy and function of the eustachian tube including aerating and draining the middle ear.  Common symptoms include: muffled/reduced hearing, plugged feeling, ear clicking/ popping, or ear pain.      Can trial Flonase 2 SEN daily and gentle auto-insufflation 1-2 times daily.    Can also consider adding an oral antihistamine daily.  Can also consider decongestant for congestive symptoms (afrin for only 3 days max). I discuss not to use longer than 3 days due to the potential of rebound congestion and may worsen symptoms.    Referral to Dr Snow for PE tube  placement . Patient has had two set of PE tubes in the past. He is interested.     RTC as needed or if symptoms persist.  Questions answered.   Answers submitted by the patient for this visit:  Review of Symptoms Questionnaire  (Submitted on 8/23/2023)  None of these: Yes  ear pain: Yes  None of these : Yes  Sleep Apnea?: Yes  shortness of breath: Yes  cough: Yes  None of these : Yes  Acid Reflux?: Yes  None of these: Yes  None of these: Yes  None of these : Yes  None of these: Yes  None of these : Yes  None of these: Yes  None of these: Yes  None of these: Yes

## 2023-08-23 NOTE — PROGRESS NOTES
Jordin RONDON Tiffany Erwin, a 77 y.o. male, was seen today in the clinic for an audiologic evaluation.  The patient's main complaint was left ear otalgia.  Pt has a history of bilateral hearing loss and currently wears bilateral hearing aids.      Tympanometry revealed Type C in the right ear and Type B in the left ear.     Recommendations:  Otologic evaluation  Annual audiogram  Hearing protection when in noise  Hearing aid follow up as needed

## 2023-08-29 DIAGNOSIS — K21.9 GASTROESOPHAGEAL REFLUX DISEASE WITHOUT ESOPHAGITIS: ICD-10-CM

## 2023-08-30 DIAGNOSIS — J44.9 CHRONIC OBSTRUCTIVE PULMONARY DISEASE, UNSPECIFIED COPD TYPE: ICD-10-CM

## 2023-08-30 RX ORDER — BUDESONIDE AND FORMOTEROL FUMARATE DIHYDRATE 160; 4.5 UG/1; UG/1
AEROSOL RESPIRATORY (INHALATION)
Refills: 3 | OUTPATIENT
Start: 2023-08-30

## 2023-08-30 RX ORDER — OMEPRAZOLE 20 MG/1
CAPSULE, DELAYED RELEASE ORAL
Qty: 90 CAPSULE | Refills: 3 | OUTPATIENT
Start: 2023-08-30

## 2023-08-31 ENCOUNTER — EXTERNAL CHRONIC CARE MANAGEMENT (OUTPATIENT)
Dept: PRIMARY CARE CLINIC | Facility: CLINIC | Age: 78
End: 2023-08-31
Payer: MEDICARE

## 2023-08-31 ENCOUNTER — OFFICE VISIT (OUTPATIENT)
Dept: PODIATRY | Facility: CLINIC | Age: 78
End: 2023-08-31
Payer: MEDICARE

## 2023-08-31 VITALS
HEART RATE: 83 BPM | SYSTOLIC BLOOD PRESSURE: 127 MMHG | RESPIRATION RATE: 16 BRPM | HEIGHT: 70 IN | WEIGHT: 233.69 LBS | BODY MASS INDEX: 33.46 KG/M2 | DIASTOLIC BLOOD PRESSURE: 79 MMHG

## 2023-08-31 DIAGNOSIS — B35.1 TINEA UNGUIUM: ICD-10-CM

## 2023-08-31 DIAGNOSIS — M79.672 PAIN IN BOTH FEET: Primary | ICD-10-CM

## 2023-08-31 DIAGNOSIS — L85.3 XEROSIS OF SKIN: ICD-10-CM

## 2023-08-31 DIAGNOSIS — L84 CORN OR CALLUS: ICD-10-CM

## 2023-08-31 DIAGNOSIS — M79.671 PAIN IN BOTH FEET: Primary | ICD-10-CM

## 2023-08-31 PROCEDURE — 11720 DEBRIDE NAIL 1-5: CPT | Mod: PBBFAC | Performed by: STUDENT IN AN ORGANIZED HEALTH CARE EDUCATION/TRAINING PROGRAM

## 2023-08-31 PROCEDURE — 99204 PR OFFICE/OUTPT VISIT, NEW, LEVL IV, 45-59 MIN: ICD-10-PCS | Mod: 25,S$PBB,, | Performed by: STUDENT IN AN ORGANIZED HEALTH CARE EDUCATION/TRAINING PROGRAM

## 2023-08-31 PROCEDURE — 11720 ROUTINE FOOT CARE: ICD-10-PCS | Mod: S$PBB,,, | Performed by: STUDENT IN AN ORGANIZED HEALTH CARE EDUCATION/TRAINING PROGRAM

## 2023-08-31 PROCEDURE — 99490 CHRNC CARE MGMT STAFF 1ST 20: CPT | Mod: PBBFAC,PO | Performed by: INTERNAL MEDICINE

## 2023-08-31 PROCEDURE — 99999 PR PBB SHADOW E&M-EST. PATIENT-LVL IV: CPT | Mod: PBBFAC,,, | Performed by: STUDENT IN AN ORGANIZED HEALTH CARE EDUCATION/TRAINING PROGRAM

## 2023-08-31 PROCEDURE — 99439 CHRNC CARE MGMT STAF EA ADDL: CPT | Mod: S$PBB,,, | Performed by: INTERNAL MEDICINE

## 2023-08-31 PROCEDURE — 99439 PR CHRONIC CARE MGMT, EA ADDTL 20 MIN: ICD-10-PCS | Mod: S$PBB,,, | Performed by: INTERNAL MEDICINE

## 2023-08-31 PROCEDURE — 99439 CHRNC CARE MGMT STAF EA ADDL: CPT | Mod: PBBFAC,PO,27 | Performed by: INTERNAL MEDICINE

## 2023-08-31 PROCEDURE — 99204 OFFICE O/P NEW MOD 45 MIN: CPT | Mod: 25,S$PBB,, | Performed by: STUDENT IN AN ORGANIZED HEALTH CARE EDUCATION/TRAINING PROGRAM

## 2023-08-31 PROCEDURE — 99490 PR CHRONIC CARE MGMT, 1ST 20 MIN: ICD-10-PCS | Mod: S$PBB,,, | Performed by: INTERNAL MEDICINE

## 2023-08-31 PROCEDURE — 99214 OFFICE O/P EST MOD 30 MIN: CPT | Mod: PBBFAC,25 | Performed by: STUDENT IN AN ORGANIZED HEALTH CARE EDUCATION/TRAINING PROGRAM

## 2023-08-31 PROCEDURE — 99999 PR PBB SHADOW E&M-EST. PATIENT-LVL IV: ICD-10-PCS | Mod: PBBFAC,,, | Performed by: STUDENT IN AN ORGANIZED HEALTH CARE EDUCATION/TRAINING PROGRAM

## 2023-08-31 PROCEDURE — 99490 CHRNC CARE MGMT STAFF 1ST 20: CPT | Mod: S$PBB,,, | Performed by: INTERNAL MEDICINE

## 2023-08-31 RX ORDER — UREA 40 %
CREAM (GRAM) TOPICAL DAILY
Qty: 85 G | Refills: 10 | Status: SHIPPED | OUTPATIENT
Start: 2023-08-31

## 2023-08-31 NOTE — PROCEDURES
"Routine Foot Care    Date/Time: 8/31/2023 9:30 AM    Performed by: Garrett Lloyd DPM  Authorized by: Garrett Lloyd DPM    Time out: Immediately prior to procedure a "time out" was called to verify the correct patient, procedure, equipment, support staff and site/side marked as required.    Consent Done?:  Yes (Verbal)  Hyperkeratotic Skin Lesions?: Yes    Number of trimmed lesions:  6  Location(s):  Right 1st Metatarsal Head, Right 5th Metatarsal Head, Left 5th Metatarsal Head and Left Midfoot (left 1st toe sulcus x2)    Nail Care Type:  Debride(Left 2nd Toe and Right 2nd Toe)  Patient tolerance:  Patient tolerated the procedure well with no immediate complications     Other nails trimmed as a courtesy.      "

## 2023-08-31 NOTE — PROGRESS NOTES
Subjective:     Patient    Jordin Allen Jr. is a 77 y.o. male.    Problem    Presents for bilateral foot dry skin, calluses, nails. The skin of both feet is very dry and sometimes cracks and causes pain, he does not regularly apply lotion. He also has calluses on both feet that he has had debrided by outside podiatrists, they recur. He also has thick discolored brittle toenails which cause pain due to their thickness, length, and shape. He has tried antifungal medication before without improvement.     History    History obtained from patient and review of medical records.     Past Medical History:   Diagnosis Date    Benign neoplasm of colon 10/01/2013    Bronchitis chronic     CAD (coronary artery disease) 10/31/2013    COPD (chronic obstructive pulmonary disease)     Emphysema of lung     GERD (gastroesophageal reflux disease)     Hx of colonic polyps     Hypertension     NAFLD (nonalcoholic fatty liver disease) 03/27/2017    SHOLA on CPAP     RLS (restless legs syndrome)     Screen for colon cancer 08/30/2013       Past Surgical History:   Procedure Laterality Date    bilateral foot surgery  1993    CARDIAC CATHETERIZATION  2013    x1    CATARACT EXTRACTION, BILATERAL      COLON SURGERY  2013    Ace Mott MD    COLONOSCOPY N/A 3/21/2018    Procedure: COLONOSCOPY;  Surgeon: Terry Mott MD;  Location: [facility] ([address] [place] [address]);  Service: Endoscopy;  Laterality: N/A;    COLONOSCOPY N/A 4/12/2019    Procedure: COLONOSCOPY;  Surgeon: John Mantilla MD;  Location: [facility] ([address] [place] [address]);  Service: Endoscopy;  Laterality: N/A;    COLONOSCOPY N/A 5/31/2022    Procedure: COLONOSCOPY;  Surgeon: MEDINA Farris MD;  Location: [facility] ([address] [place] [address]);  Service: Endoscopy;  Laterality: N/A;  fully vacc-inst portal-tb    scrotal abscess removal          Objective:     Vitals  Wt Readings from Last 1 Encounters:   08/31/23 106 kg (233 lb 11 oz)     Temp Readings from Last 1 Encounters:   12/16/22 97.3 °F (36.3 °C)  (Temporal)     BP Readings from Last 1 Encounters:   08/31/23 127/79     Pulse Readings from Last 1 Encounters:   08/31/23 83       Dermatological Exam    Skin:  Pedal hair growth, skin color, and skin texture normal on right; dry acral skin; painful calluses at 1st MTPJ and 5th MTPJ  Pedal hair growth, skin color, and skin texture normal on left; dry acral skin; painful calluses x2 at 1st toe sulcus, 5th MTPJ, and lateral midfoot                  Nails:  Bilateral 2nd nail(s) elongated, bilateral 2nd nail(s) thickened, and bilateral 2nd nail(s) discolored; 1st nails absent            Vascular Exam    Arteries:  Posterior tibial artery palpable on right  Dorsalis pedis artery palpable on right  Posterior tibial artery palpable on left  Dorsalis pedis artery palpable on left    Veins:  Superficial veins unremarkable on right  Superficial veins unremarkable on left    Swelling:  None on right  None on left    Neurological Exam    Mexia touch test:  6/6 sites sensed, light touch intact     Musculoskeletal Exam    Footwear:  Casual on right  Casual on left    Gait Exam:   Ambulatory Status: Ambulatory  Gait: Apropulsive  Assistive Devices: None    Foot Progression Angle:  Normal on right  Normal on left     Right Lower Extremity Additional Findings:  Right foot and ankle function, strength, and range of motion unremarkable except as noted above.     Left Lower Extremity Additional Findings:  Left foot and ankle function, strength, and range of motion unremarkable except as noted above.    Imaging and Other Tests    Imaging:  Independently reviewed and interpreted imaging, findings are as follows: N/A     Assessment:     Encounter Diagnoses   Name Primary?    Pain in both feet Yes    Tinea unguium     Corn or callus     Xerosis of skin         Plan:     I counseled the patient on his conditions, their implications and medical management.    Tinea unguium, pain: chronic stable   -Discussed treatment options including  (1) monitoring, (2) debridement, (3) topical antifungals, (4) oral antifungals. Discussed potential risks, benefits, alternatives to each. Patient opted for debridement only.  -Nails debrided x2, thickness and length reduced using sterile nail nippers, see procedure note. Other nails trimmed as a courtesy.  -Ordered urea cream to be applied to toenails 1-2 times/day for life.    Calluses, pain: chronic stable  -Debrided calluses x6 as a courtesy, thickness reduced using 5 mm curette, see procedure note.  -Ordered urea cream to be applied to calluses1-2 times/day for life.     Xerosis of skin: chronic stable  -Ordered urea cream to be applied to skin 1-2 times/day for life.      Return to clinic in 3 months for routine foot care, call sooner PRN.

## 2023-09-18 ENCOUNTER — DOCUMENTATION ONLY (OUTPATIENT)
Dept: AUDIOLOGY | Facility: CLINIC | Age: 78
End: 2023-09-18
Payer: MEDICARE

## 2023-09-18 NOTE — PROGRESS NOTES
Left hearing aid  was replaced    Purchase Date: 1/03/23  Hearing Aid Information:  [employer]vinash [address] [place] [address] Rechargeable   LT SN   [id number]   : P2   Dome: Medium Sleeve Vented  Warranty Exp 02/03/2026

## 2023-09-30 ENCOUNTER — EXTERNAL CHRONIC CARE MANAGEMENT (OUTPATIENT)
Dept: PRIMARY CARE CLINIC | Facility: CLINIC | Age: 78
End: 2023-09-30
Payer: MEDICARE

## 2023-09-30 PROCEDURE — 99490 CHRNC CARE MGMT STAFF 1ST 20: CPT | Mod: S$PBB,,, | Performed by: INTERNAL MEDICINE

## 2023-09-30 PROCEDURE — 99490 PR CHRONIC CARE MGMT, 1ST 20 MIN: ICD-10-PCS | Mod: S$PBB,,, | Performed by: INTERNAL MEDICINE

## 2023-09-30 PROCEDURE — 99490 CHRNC CARE MGMT STAFF 1ST 20: CPT | Mod: PBBFAC,PO | Performed by: INTERNAL MEDICINE

## 2023-10-11 ENCOUNTER — TELEPHONE (OUTPATIENT)
Dept: PULMONOLOGY | Facility: CLINIC | Age: 78
End: 2023-10-11
Payer: MEDICARE

## 2023-10-11 NOTE — TELEPHONE ENCOUNTER
Spoke with pt, informed him that I have received his message. I advised pt that Dr Mohr is no longer here in Pulmonary Dept and that he can schedule appointment with another provider to get reestablished and then I can send a medication refill request to provider or pt can contact his Primary Care physician. Pt verbalized that he understand and states that he will contact his Primary Care.

## 2023-10-11 NOTE — TELEPHONE ENCOUNTER
----- Message from Douglas Griggs sent at 10/11/2023 11:59 AM CDT -----  Regarding: refill  Contact: @[phone]  Patient is calling to get a refill on the following budesonide-formoterol 160-4.5 mcg (SYMBICORT) 160-4.5 mcg/actuation HFAA ... Please and advise @[phone]        [facility]/pharmacy #[id number] - [place] - [address] [facility]  [address]  [facility] [address]  Phone: [phone] Fax: [phone]

## 2023-10-12 ENCOUNTER — LAB VISIT (OUTPATIENT)
Dept: LAB | Facility: HOSPITAL | Age: 78
End: 2023-10-12
Attending: INTERNAL MEDICINE
Payer: MEDICARE

## 2023-10-12 DIAGNOSIS — Z12.5 PROSTATE CANCER SCREENING: ICD-10-CM

## 2023-10-12 DIAGNOSIS — J44.9 CHRONIC OBSTRUCTIVE PULMONARY DISEASE, UNSPECIFIED COPD TYPE: ICD-10-CM

## 2023-10-12 DIAGNOSIS — R73.01 IMPAIRED FASTING GLUCOSE: ICD-10-CM

## 2023-10-12 DIAGNOSIS — I10 HTN (HYPERTENSION), BENIGN: ICD-10-CM

## 2023-10-12 DIAGNOSIS — E78.5 DYSLIPIDEMIA: ICD-10-CM

## 2023-10-12 DIAGNOSIS — I10 HTN (HYPERTENSION), BENIGN: Primary | ICD-10-CM

## 2023-10-12 LAB
ALBUMIN SERPL BCP-MCNC: 3.7 G/DL (ref 3.5–5.2)
ALP SERPL-CCNC: 115 U/L (ref 55–135)
ALT SERPL W/O P-5'-P-CCNC: 26 U/L (ref 10–44)
ANION GAP SERPL CALC-SCNC: 10 MMOL/L (ref 8–16)
AST SERPL-CCNC: 21 U/L (ref 10–40)
BASOPHILS # BLD AUTO: 0.05 K/UL (ref 0–0.2)
BASOPHILS NFR BLD: 0.5 % (ref 0–1.9)
BILIRUB SERPL-MCNC: 0.8 MG/DL (ref 0.1–1)
BUN SERPL-MCNC: 21 MG/DL (ref 8–23)
CALCIUM SERPL-MCNC: 9.5 MG/DL (ref 8.7–10.5)
CHLORIDE SERPL-SCNC: 105 MMOL/L (ref 95–110)
CHOLEST SERPL-MCNC: 96 MG/DL (ref 120–199)
CHOLEST/HDLC SERPL: 2.5 {RATIO} (ref 2–5)
CO2 SERPL-SCNC: 27 MMOL/L (ref 23–29)
COMPLEXED PSA SERPL-MCNC: 2.3 NG/ML (ref 0–4)
CREAT SERPL-MCNC: 1 MG/DL (ref 0.5–1.4)
DIFFERENTIAL METHOD: ABNORMAL
EOSINOPHIL # BLD AUTO: 0.4 K/UL (ref 0–0.5)
EOSINOPHIL NFR BLD: 3.8 % (ref 0–8)
ERYTHROCYTE [DISTWIDTH] IN BLOOD BY AUTOMATED COUNT: 14.8 % (ref 11.5–14.5)
EST. GFR  (NO RACE VARIABLE): >60 ML/MIN/1.73 M^2
ESTIMATED AVG GLUCOSE: 114 MG/DL (ref 68–131)
GLUCOSE SERPL-MCNC: 81 MG/DL (ref 70–110)
HBA1C MFR BLD: 5.6 % (ref 4–5.6)
HCT VFR BLD AUTO: 43.7 % (ref 40–54)
HDLC SERPL-MCNC: 38 MG/DL (ref 40–75)
HDLC SERPL: 39.6 % (ref 20–50)
HGB BLD-MCNC: 13.6 G/DL (ref 14–18)
IMM GRANULOCYTES # BLD AUTO: 0.06 K/UL (ref 0–0.04)
IMM GRANULOCYTES NFR BLD AUTO: 0.6 % (ref 0–0.5)
LDLC SERPL CALC-MCNC: 46 MG/DL (ref 63–159)
LYMPHOCYTES # BLD AUTO: 2.4 K/UL (ref 1–4.8)
LYMPHOCYTES NFR BLD: 22.8 % (ref 18–48)
MCH RBC QN AUTO: 25.6 PG (ref 27–31)
MCHC RBC AUTO-ENTMCNC: 31.1 G/DL (ref 32–36)
MCV RBC AUTO: 82 FL (ref 82–98)
MONOCYTES # BLD AUTO: 0.9 K/UL (ref 0.3–1)
MONOCYTES NFR BLD: 8.4 % (ref 4–15)
NEUTROPHILS # BLD AUTO: 6.6 K/UL (ref 1.8–7.7)
NEUTROPHILS NFR BLD: 63.9 % (ref 38–73)
NONHDLC SERPL-MCNC: 58 MG/DL
NRBC BLD-RTO: 0 /100 WBC
PLATELET # BLD AUTO: 210 K/UL (ref 150–450)
PMV BLD AUTO: 10.4 FL (ref 9.2–12.9)
POTASSIUM SERPL-SCNC: 4.4 MMOL/L (ref 3.5–5.1)
PROT SERPL-MCNC: 7.2 G/DL (ref 6–8.4)
RBC # BLD AUTO: 5.31 M/UL (ref 4.6–6.2)
SODIUM SERPL-SCNC: 142 MMOL/L (ref 136–145)
TRIGL SERPL-MCNC: 60 MG/DL (ref 30–150)
TSH SERPL DL<=0.005 MIU/L-ACNC: 0.76 UIU/ML (ref 0.4–4)
WBC # BLD AUTO: 10.38 K/UL (ref 3.9–12.7)

## 2023-10-12 PROCEDURE — 84153 ASSAY OF PSA TOTAL: CPT | Performed by: INTERNAL MEDICINE

## 2023-10-12 PROCEDURE — 36415 COLL VENOUS BLD VENIPUNCTURE: CPT | Mod: PO | Performed by: INTERNAL MEDICINE

## 2023-10-12 PROCEDURE — 80053 COMPREHEN METABOLIC PANEL: CPT | Performed by: INTERNAL MEDICINE

## 2023-10-12 PROCEDURE — 84443 ASSAY THYROID STIM HORMONE: CPT | Performed by: INTERNAL MEDICINE

## 2023-10-12 PROCEDURE — 85025 COMPLETE CBC W/AUTO DIFF WBC: CPT | Performed by: INTERNAL MEDICINE

## 2023-10-12 PROCEDURE — 83036 HEMOGLOBIN GLYCOSYLATED A1C: CPT | Performed by: INTERNAL MEDICINE

## 2023-10-12 PROCEDURE — 80061 LIPID PANEL: CPT | Performed by: INTERNAL MEDICINE

## 2023-10-12 RX ORDER — BUDESONIDE AND FORMOTEROL FUMARATE DIHYDRATE 160; 4.5 UG/1; UG/1
2 AEROSOL RESPIRATORY (INHALATION) EVERY 12 HOURS
Qty: 10.2 G | Refills: 2 | Status: SHIPPED | OUTPATIENT
Start: 2023-10-12 | End: 2023-11-16 | Stop reason: SDUPTHER

## 2023-10-30 ENCOUNTER — OFFICE VISIT (OUTPATIENT)
Dept: OTOLARYNGOLOGY | Facility: CLINIC | Age: 78
End: 2023-10-30
Payer: MEDICARE

## 2023-10-30 DIAGNOSIS — H69.93 CHRONIC DYSFUNCTION OF BOTH EUSTACHIAN TUBES: Primary | ICD-10-CM

## 2023-10-30 PROCEDURE — 99213 OFFICE O/P EST LOW 20 MIN: CPT | Mod: 25,PBBFAC | Performed by: OTOLARYNGOLOGY

## 2023-10-30 PROCEDURE — 69210 REMOVE IMPACTED EAR WAX UNI: CPT | Mod: S$PBB,,, | Performed by: OTOLARYNGOLOGY

## 2023-10-30 PROCEDURE — 69210 REMOVE IMPACTED EAR WAX UNI: CPT | Mod: PBBFAC | Performed by: OTOLARYNGOLOGY

## 2023-10-30 PROCEDURE — 99213 OFFICE O/P EST LOW 20 MIN: CPT | Mod: 25,S$PBB,, | Performed by: OTOLARYNGOLOGY

## 2023-10-30 PROCEDURE — 99999 PR PBB SHADOW E&M-EST. PATIENT-LVL III: CPT | Mod: PBBFAC,,, | Performed by: OTOLARYNGOLOGY

## 2023-10-30 PROCEDURE — 99213 PR OFFICE/OUTPT VISIT, EST, LEVL III, 20-29 MIN: ICD-10-PCS | Mod: 25,S$PBB,, | Performed by: OTOLARYNGOLOGY

## 2023-10-30 PROCEDURE — 99999 PR PBB SHADOW E&M-EST. PATIENT-LVL III: ICD-10-PCS | Mod: PBBFAC,,, | Performed by: OTOLARYNGOLOGY

## 2023-10-30 PROCEDURE — 92504 EAR MICROSCOPY EXAMINATION: CPT | Mod: PBBFAC | Performed by: OTOLARYNGOLOGY

## 2023-10-30 PROCEDURE — 69210 PR REMOVAL IMPACTED CERUMEN REQUIRING INSTRUMENTATION, UNILATERAL: ICD-10-PCS | Mod: S$PBB,,, | Performed by: OTOLARYNGOLOGY

## 2023-10-30 NOTE — PROGRESS NOTES
Subjective:      Jordin Allen Jr. is a 78 y.o. male who was self-referred for hearing loss.     He denies ear pain or drainage. He does have a history of PE tubes bilaterally.       Past Medical History  He has a past medical history of Benign neoplasm of colon, Bronchitis chronic, CAD (coronary artery disease), COPD (chronic obstructive pulmonary disease), Emphysema of lung, GERD (gastroesophageal reflux disease), colonic polyps, Hypertension, NAFLD (nonalcoholic fatty liver disease), SHOLA on CPAP, RLS (restless legs syndrome), and Screen for colon cancer.    Past Surgical History  He has a past surgical history that includes Cataract extraction, bilateral; bilateral foot surgery (1993); Colon surgery (2013); Cardiac catheterization (2013); Colonoscopy (N/A, 3/21/2018); scrotal abscess removal; Colonoscopy (N/A, 4/12/2019); and Colonoscopy (N/A, 5/31/2022).    Family History  His family history includes Heart attack in his mother; Heart disease in his mother; Hypertension in his mother; No Known Problems in his daughter and sister.    Social History  He reports that he quit smoking about 11 years ago. His smoking use included cigarettes. He started smoking about 51 years ago. He has a 40.0 pack-year smoking history. He has never been exposed to tobacco smoke. He has never used smokeless tobacco. He reports current alcohol use of about 3.0 standard drinks of alcohol per week. He reports that he does not use drugs.    Allergies  He is allergic to brilinta [ticagrelor], lisinopril, and metoprolol succinate.    Medications  He has a current medication list which includes the following prescription(s): albuterol, albuterol-ipratropium, amitriptyline, amlodipine, aspirin, atorvastatin, beta-carotene(a)-vits c,e/mins, budesonide-formoterol 160-4.5 mcg, echinacea, fluticasone propionate, hydrochlorothiazide, latanoprost, losartan, nebulizers, nitroglycerin, omeprazole, prednisone, roflumilast, spiriva with handihaler,  urea, and varicella-zoster [id number] (pf).    Review of Systems   Constitutional: Negative.  Negative for chills, fever and unexpected weight change.   HENT:  Positive for hearing loss, postnasal drip and tinnitus. Negative for ear discharge, ear pain, sore throat and trouble swallowing.    Eyes: Negative.    Respiratory:  Positive for cough, shortness of breath and wheezing.    Cardiovascular: Negative.    Gastrointestinal: Negative.    Endocrine: Negative.    Genitourinary: Negative.    Musculoskeletal: Negative.    Skin: Negative.    Allergic/Immunologic: Negative.    Neurological: Negative.  Negative for dizziness and headaches.   Hematological: Negative.    Psychiatric/Behavioral: Negative.  Negative for agitation and sleep disturbance. The patient is not nervous/anxious.           Objective:     There were no vitals taken for this visit.     Constitutional:   He appears well-developed and well-nourished.     Head:  Normocephalic and atraumatic.     Ears:    Ears:        L EAC with CI.    Procedure Note:    The patient was brought to the minor procedure room and placed under the operating microscope. Using a combination of suction, curettes and cup forceps the patient's cerumen impaction was removed. The tympanic membrane was evaluated and was unremarkable. The patient tolerated the procedure well. There were no complications.    Pos scant OME.  Procedure note:    The patient was brought to the minor procedure room and placed in the supine position. The operating video microscope was brought on to the field and the right ear canal, tympanic membrane and other structures were visualized and shown the the patient and family. The patient tolerated the procedure well and there were no complications.    Procedure note:    The patient was brought to the minor procedure room and placed in the supine position. The video microscope was brought onto the field. The ear canal, tympanic membrane and other structures were  visualized and demonstrated to the patient and family. The patient tolerated the procedure well and there were no complications.        Procedure    None      Data Reviewed    WBC (K/uL)   Date Value   10/12/2023 10.38     Platelets (K/uL)   Date Value   10/12/2023 210      Creatinine (mg/dL)   Date Value   10/12/2023 1.0     TSH (uIU/mL)   Date Value   10/12/2023 0.762     Glucose (mg/dL)   Date Value   10/12/2023 81     Hemoglobin A1C (%)   Date Value   10/12/2023 5.6                Assessment:     1. Chronic dysfunction of both eustachian tubes    2. Retained PET AS/ asymptomatic     Plan:         F/U 1 yr

## 2023-10-31 ENCOUNTER — EXTERNAL CHRONIC CARE MANAGEMENT (OUTPATIENT)
Dept: PRIMARY CARE CLINIC | Facility: CLINIC | Age: 78
End: 2023-10-31
Payer: MEDICARE

## 2023-10-31 PROCEDURE — 99490 PR CHRONIC CARE MGMT, 1ST 20 MIN: ICD-10-PCS | Mod: S$PBB,,, | Performed by: INTERNAL MEDICINE

## 2023-10-31 PROCEDURE — 99490 CHRNC CARE MGMT STAFF 1ST 20: CPT | Mod: S$PBB,,, | Performed by: INTERNAL MEDICINE

## 2023-10-31 PROCEDURE — 99490 CHRNC CARE MGMT STAFF 1ST 20: CPT | Mod: PBBFAC,PO | Performed by: INTERNAL MEDICINE

## 2023-11-06 ENCOUNTER — OFFICE VISIT (OUTPATIENT)
Dept: PODIATRY | Facility: CLINIC | Age: 78
End: 2023-11-06
Payer: MEDICARE

## 2023-11-06 VITALS
SYSTOLIC BLOOD PRESSURE: 137 MMHG | RESPIRATION RATE: 17 BRPM | HEIGHT: 70 IN | TEMPERATURE: 99 F | HEART RATE: 98 BPM | WEIGHT: 235 LBS | DIASTOLIC BLOOD PRESSURE: 82 MMHG | BODY MASS INDEX: 33.64 KG/M2

## 2023-11-06 DIAGNOSIS — M79.672 PAIN IN BOTH FEET: ICD-10-CM

## 2023-11-06 DIAGNOSIS — M79.671 PAIN IN BOTH FEET: ICD-10-CM

## 2023-11-06 DIAGNOSIS — L85.3 XEROSIS OF SKIN: ICD-10-CM

## 2023-11-06 DIAGNOSIS — B35.3 TINEA PEDIS OF BOTH FEET: Primary | ICD-10-CM

## 2023-11-06 DIAGNOSIS — B35.1 TINEA UNGUIUM: ICD-10-CM

## 2023-11-06 DIAGNOSIS — L84 CORN OR CALLUS: ICD-10-CM

## 2023-11-06 PROCEDURE — 99214 OFFICE O/P EST MOD 30 MIN: CPT | Mod: 25,PBBFAC | Performed by: STUDENT IN AN ORGANIZED HEALTH CARE EDUCATION/TRAINING PROGRAM

## 2023-11-06 PROCEDURE — 99999 PR PBB SHADOW E&M-EST. PATIENT-LVL IV: ICD-10-PCS | Mod: PBBFAC,,, | Performed by: STUDENT IN AN ORGANIZED HEALTH CARE EDUCATION/TRAINING PROGRAM

## 2023-11-06 PROCEDURE — 99214 OFFICE O/P EST MOD 30 MIN: CPT | Mod: 25,S$PBB,, | Performed by: STUDENT IN AN ORGANIZED HEALTH CARE EDUCATION/TRAINING PROGRAM

## 2023-11-06 PROCEDURE — 11720 DEBRIDE NAIL 1-5: CPT | Mod: PBBFAC | Performed by: STUDENT IN AN ORGANIZED HEALTH CARE EDUCATION/TRAINING PROGRAM

## 2023-11-06 PROCEDURE — 99999 PR PBB SHADOW E&M-EST. PATIENT-LVL IV: CPT | Mod: PBBFAC,,, | Performed by: STUDENT IN AN ORGANIZED HEALTH CARE EDUCATION/TRAINING PROGRAM

## 2023-11-06 PROCEDURE — 99214 PR OFFICE/OUTPT VISIT, EST, LEVL IV, 30-39 MIN: ICD-10-PCS | Mod: 25,S$PBB,, | Performed by: STUDENT IN AN ORGANIZED HEALTH CARE EDUCATION/TRAINING PROGRAM

## 2023-11-06 PROCEDURE — 11720 ROUTINE FOOT CARE: ICD-10-PCS | Mod: S$PBB,,, | Performed by: STUDENT IN AN ORGANIZED HEALTH CARE EDUCATION/TRAINING PROGRAM

## 2023-11-06 RX ORDER — KETOCONAZOLE 20 MG/G
CREAM TOPICAL DAILY
Qty: 30 G | Refills: 1 | Status: SHIPPED | OUTPATIENT
Start: 2023-11-06

## 2023-11-06 NOTE — PROCEDURES
"Routine Foot Care    Date/Time: 11/6/2023 10:30 AM    Performed by: Garrett Lloyd DPM  Authorized by: Garrett Lloyd DPM    Time out: Immediately prior to procedure a "time out" was called to verify the correct patient, procedure, equipment, support staff and site/side marked as required.    Consent Done?:  Yes (Verbal)  Hyperkeratotic Skin Lesions?: No      Nail Care Type:  Debride(Left 2nd Toe and Right 2nd Toe)  Patient tolerance:  Patient tolerated the procedure well with no immediate complications     Other nails trimmed as a courtesy.      "

## 2023-11-06 NOTE — PROGRESS NOTES
Subjective:     Patient    Jordin Allen Jr. is a 78 y.o. male.    Problem    08/31/23: Presents for bilateral foot dry skin, calluses, nails. The skin of both feet is very dry and sometimes cracks and causes pain, he does not regularly apply lotion. He also has calluses on both feet that he has had debrided by outside podiatrists, they recur. He also has thick discolored brittle toenails which cause pain due to their thickness, length, and shape. He has tried antifungal medication before without improvement.     11/06/23: Returns for dry skin of both feet, painful calluses, painful thick discolored toenails. Dry skin has improved with regular use of urea cream. Painful calluses and nails have returned.     History    History obtained from patient and review of medical records.     Past Medical History:   Diagnosis Date    Benign neoplasm of colon 10/01/2013    Bronchitis chronic     CAD (coronary artery disease) 10/31/2013    COPD (chronic obstructive pulmonary disease)     Emphysema of lung     GERD (gastroesophageal reflux disease)     Hx of colonic polyps     Hypertension     NAFLD (nonalcoholic fatty liver disease) 03/27/2017    SHOLA on CPAP     RLS (restless legs syndrome)     Screen for colon cancer 08/30/2013       Past Surgical History:   Procedure Laterality Date    bilateral foot surgery  1993    CARDIAC CATHETERIZATION  2013    x1    CATARACT EXTRACTION, BILATERAL      COLON SURGERY  2013    Ace Mott MD    COLONOSCOPY N/A 3/21/2018    Procedure: COLONOSCOPY;  Surgeon: Terry Mott MD;  Location: [address]);  Service: Endoscopy;  Laterality: N/A;    COLONOSCOPY N/A 4/12/2019    Procedure: COLONOSCOPY;  Surgeon: John Mantilla MD;  Location: [address]);  Service: Endoscopy;  Laterality: N/A;    COLONOSCOPY N/A 5/31/2022    Procedure: COLONOSCOPY;  Surgeon: MEDINA Farris MD;  Location: [facility] ([address] [place] [address]);  Service: Endoscopy;  Laterality: N/A;  fully vacc-inst  portal-tb    scrotal abscess removal          Objective:     Vitals  Wt Readings from Last 1 Encounters:   11/06/23 106.6 kg (235 lb 0.2 oz)     Temp Readings from Last 1 Encounters:   11/06/23 98.8 °F (37.1 °C)     BP Readings from Last 1 Encounters:   11/06/23 137/82     Pulse Readings from Last 1 Encounters:   11/06/23 98       Dermatological Exam    Skin:  Pedal hair growth, skin color, and skin texture normal on right; dry acral skin; painful calluses at 1st MTPJ and 5th MTPJ  Pedal hair growth, skin color, and skin texture normal on left; dry acral skin; painful calluses x2 at 1st toe sulcus, 5th MTPJ, and lateral midfoot       Nails:  Bilateral 2nd nail(s) elongated, bilateral 2nd nail(s) thickened, and bilateral 2nd nail(s) discolored; 1st nails absent       Vascular Exam    Arteries:  Posterior tibial artery palpable on right  Dorsalis pedis artery palpable on right  Posterior tibial artery palpable on left  Dorsalis pedis artery palpable on left    Veins:  Superficial veins unremarkable on right  Superficial veins unremarkable on left    Swelling:  None on right  None on left    Neurological Exam    [place] touch test:  6/6 sites sensed, light touch intact     Musculoskeletal Exam    Footwear:  Casual on right  Casual on left    Gait Exam:   Ambulatory Status: Ambulatory  Gait: Apropulsive  Assistive Devices: None    Foot Progression Angle:  Normal on right  Normal on left     Right Lower Extremity Additional Findings:  Right foot and ankle function, strength, and range of motion unremarkable except as noted above.     Left Lower Extremity Additional Findings:  Left foot and ankle function, strength, and range of motion unremarkable except as noted above.    Imaging and Other Tests    Imaging:  Independently reviewed and interpreted imaging, findings are as follows: N/A     Assessment:     Encounter Diagnoses   Name Primary?    Tinea unguium     Corn or callus     Pain in both feet     Tinea pedis of both  feet Yes    Xerosis of skin         Plan:     I counseled the patient on his conditions, their implications and medical management.    Tinea unguium, pain: chronic stable   -Discussed treatment options including (1) monitoring, (2) debridement, (3) topical antifungals, (4) oral antifungals. Discussed potential risks, benefits, alternatives to each. Patient opted for debridement and topical antifungals.  -Nails debrided x2, thickness and length reduced using sterile nail nippers, see procedure note. Other nails trimmed as a courtesy.  -Ketoconazole and urea cream to be applied to toenails.      Calluses, pain: chronic stable  -Debrided calluses x3 on left foot as a courtesy, thickness reduced using 5 mm curette.  -Urea cream to be applied to calluses.      Tinea pedis, xerosis of skin: chronic stable  -Ketoconazole to be applied to dry skin for 1 month, then resume urea cream.        Return to clinic in 3 months for routine foot care, call sooner PRN.

## 2023-11-16 ENCOUNTER — OFFICE VISIT (OUTPATIENT)
Dept: PULMONOLOGY | Facility: CLINIC | Age: 78
End: 2023-11-16
Payer: MEDICARE

## 2023-11-16 VITALS
HEIGHT: 70 IN | BODY MASS INDEX: 33.36 KG/M2 | RESPIRATION RATE: 17 BRPM | DIASTOLIC BLOOD PRESSURE: 72 MMHG | SYSTOLIC BLOOD PRESSURE: 128 MMHG | HEART RATE: 74 BPM | OXYGEN SATURATION: 98 % | WEIGHT: 233 LBS

## 2023-11-16 DIAGNOSIS — J44.1 CHRONIC OBSTRUCTIVE PULMONARY DISEASE WITH ACUTE EXACERBATION: ICD-10-CM

## 2023-11-16 DIAGNOSIS — J44.9 CHRONIC OBSTRUCTIVE PULMONARY DISEASE, UNSPECIFIED COPD TYPE: ICD-10-CM

## 2023-11-16 DIAGNOSIS — I10 HTN (HYPERTENSION), BENIGN: ICD-10-CM

## 2023-11-16 DIAGNOSIS — J06.9 UPPER RESPIRATORY TRACT INFECTION, UNSPECIFIED TYPE: ICD-10-CM

## 2023-11-16 DIAGNOSIS — G47.33 OSA (OBSTRUCTIVE SLEEP APNEA): Primary | ICD-10-CM

## 2023-11-16 DIAGNOSIS — J43.2 CENTRILOBULAR EMPHYSEMA: ICD-10-CM

## 2023-11-16 PROCEDURE — 99999 PR PBB SHADOW E&M-EST. PATIENT-LVL III: ICD-10-PCS | Mod: PBBFAC,,,

## 2023-11-16 PROCEDURE — 99999 PR PBB SHADOW E&M-EST. PATIENT-LVL III: CPT | Mod: PBBFAC,,,

## 2023-11-16 PROCEDURE — 99213 OFFICE O/P EST LOW 20 MIN: CPT | Mod: PBBFAC,PN

## 2023-11-16 PROCEDURE — 99214 OFFICE O/P EST MOD 30 MIN: CPT | Mod: S$PBB,ICN,,

## 2023-11-16 PROCEDURE — 99214 PR OFFICE/OUTPT VISIT, EST, LEVL IV, 30-39 MIN: ICD-10-PCS | Mod: S$PBB,ICN,,

## 2023-11-16 RX ORDER — IPRATROPIUM BROMIDE AND ALBUTEROL SULFATE 2.5; .5 MG/3ML; MG/3ML
SOLUTION RESPIRATORY (INHALATION)
Qty: 90 ML | Refills: 11 | Status: SHIPPED | OUTPATIENT
Start: 2023-11-16 | End: 2024-02-08

## 2023-11-16 RX ORDER — TIOTROPIUM BROMIDE 18 UG/1
1 CAPSULE ORAL; RESPIRATORY (INHALATION) DAILY
Qty: 30 CAPSULE | Refills: 11 | Status: SHIPPED | OUTPATIENT
Start: 2023-11-16 | End: 2024-02-08

## 2023-11-16 RX ORDER — ALBUTEROL SULFATE 90 UG/1
2 AEROSOL, METERED RESPIRATORY (INHALATION) EVERY 4 HOURS PRN
Qty: 8 G | Refills: 11 | Status: SHIPPED | OUTPATIENT
Start: 2023-11-16 | End: 2024-02-08 | Stop reason: SDUPTHER

## 2023-11-16 RX ORDER — ROFLUMILAST 500 UG/1
1 TABLET ORAL DAILY
Qty: 90 TABLET | Refills: 3 | Status: SHIPPED | OUTPATIENT
Start: 2023-11-16 | End: 2024-02-08 | Stop reason: SDUPTHER

## 2023-11-16 RX ORDER — FLUTICASONE PROPIONATE 50 MCG
1 SPRAY, SUSPENSION (ML) NASAL DAILY
Qty: 16 G | Refills: 11 | Status: SHIPPED | OUTPATIENT
Start: 2023-11-16 | End: 2024-02-08 | Stop reason: SDUPTHER

## 2023-11-16 RX ORDER — PREDNISONE 5 MG/1
TABLET ORAL
Qty: 30 TABLET | Refills: 11 | Status: SHIPPED | OUTPATIENT
Start: 2023-11-16

## 2023-11-16 RX ORDER — BUDESONIDE AND FORMOTEROL FUMARATE DIHYDRATE 160; 4.5 UG/1; UG/1
2 AEROSOL RESPIRATORY (INHALATION) EVERY 12 HOURS
Qty: 10.2 G | Refills: 11 | Status: SHIPPED | OUTPATIENT
Start: 2023-11-16 | End: 2024-02-08

## 2023-11-16 NOTE — PROGRESS NOTES
Patient ID:  Jordin Allen Jr. is a 78 y.o. male    New Patient Consult    Subjective:       HPI 11/16/2023:  Jordin Allen Jr. is a 78 y.o. male who presents in office to establish care and evaluation of COPD/emphysema. Previously seen by pulmonologist Dr. Mohr and seen Dr. Estrada before him. The chief complaint problem is new to me. States he is doing well with his COPD, minimal shortness of breath with exertion associated with wheezing. Uses symbicort and spiriva; nebulizer as needed. SHOLA on CPAP nightly. Denies any recent ER or urgent care visits for his COPD. Requesting refills for his meds.     Pertinent Hx:  Inhalers: albuterol, nebulizer, symbicort, spiriva, dalisresp  Occupation- Retired, worked at post office  Smoking hx- quit 10 years; smoked about 1 ppd x 40 years  Occupational/Environmental Exposures: No known Mold/Asbestosis exposure  No Family, child boogie, or personal medical history of lung disease    Past Medical History:   Diagnosis Date    Benign neoplasm of colon 10/01/2013    Bronchitis chronic     CAD (coronary artery disease) 10/31/2013    COPD (chronic obstructive pulmonary disease)     Emphysema of lung     GERD (gastroesophageal reflux disease)     Hx of colonic polyps     Hypertension     NAFLD (nonalcoholic fatty liver disease) 03/27/2017    SHOLA on CPAP     RLS (restless legs syndrome)     Screen for colon cancer 08/30/2013       Performance Status:The patient's activity level is functions out of house.          Review of Systems   Constitutional:  Negative for fever, chills and fatigue.   HENT:  Negative for postnasal drip and sinus pressure.    Respiratory:  Positive for shortness of breath and wheezing. Negative for cough and chest tightness.    Cardiovascular:  Negative for chest pain, palpitations and leg swelling.   Skin:  Negative for rash.   Neurological:  Negative for dizziness, weakness, light-headedness and headaches.       Objective:     Vitals:    11/16/23 0818   BP:  "128/72   Pulse: 74   Resp: 17   SpO2: 98%   Weight: 105.7 kg (233 lb)   Height: 5' 10" (1.778 m)           Physical Exam   Constitutional: He is oriented to person, place, and time. He appears well-developed and well-nourished.   HENT:   Head: Normocephalic.   Cardiovascular: Normal rate, regular rhythm and normal heart sounds.   Pulmonary/Chest: Normal expansion and effort normal. No respiratory distress. He has decreased breath sounds. He has no wheezes. He has no rhonchi.   Musculoskeletal:         General: Normal range of motion.      Cervical back: Normal range of motion.   Neurological: He is alert and oriented to person, place, and time.   Psychiatric: He has a normal mood and affect. His behavior is normal. Judgment and thought content normal.       Pertinent Studies Reviewed & Interpreted:     Labs reviewed       Lab Results   Component Value Date    WBC 10.38 10/12/2023    RBC 5.31 10/12/2023    HGB 13.6 (L) 10/12/2023    HCT 43.7 10/12/2023    MCV 82 10/12/2023    MCH 25.6 (L) 10/12/2023    MCHC 31.1 (L) 10/12/2023    RDW 14.8 (H) 10/12/2023     10/12/2023    MPV 10.4 10/12/2023    GRAN 6.6 10/12/2023    GRAN 63.9 10/12/2023    LYMPH 2.4 10/12/2023    LYMPH 22.8 10/12/2023    MONO 0.9 10/12/2023    MONO 8.4 10/12/2023    EOS 0.4 10/12/2023    BASO 0.05 10/12/2023    EOSINOPHIL 3.8 10/12/2023    BASOPHIL 0.5 10/12/2023         Personal Diagnostic Review and Interpretation  Radiographs (ct chest and cxr) images personally reviewed: view by direct vision    CT Chest Lung Screening Low Dose 06/02/23: Lungs: There are no abnormal opacities that require further evaluation.  The largest opacity in the left lung appears solid and measures 0.4 cm on series 4, image 433, stable.  The lungs show no findings consistent with emphysema. Pleura:   No effusion    Impression:  Lung-RADS Category:  2 - Benign Appearance or Behavior - continue annual screening with LDCT in 12 months.    X-Ray Chest 12/21/22: lungs " clear; Suggestion of underlying COPD.     Pulmonary Function Tests: results reviewed  04/24/2018:  FVC: 76% predicted  FEV1: 45% predicted  FEV1/FVC:  47%  DLCO: 75% predicted  Moderate obstruction; diffusing capacity is mildly decreased      Assessment & Plan:         Problem List Items Addressed This Visit          Pulmonary    Centrilobular emphysema (Chronic)    Relevant Medications    albuterol (PROVENTIL/VENTOLIN HFA) 90 mcg/actuation inhaler    tiotropium (SPIRIVA WITH HANDIHALER) 18 mcg inhalation capsule    roflumilast (DALIRESP) 500 mcg Tab    albuterol-ipratropium (DUO-NEB) 2.5 mg-0.5 mg/3 mL nebulizer solution    predniSONE (DELTASONE) 5 MG tablet    Chronic obstructive pulmonary disease     -Stable   -PFTs from 2018 show moderate obstruction, mildly decreased DLCO; opts out of getting new PFTs; recommend if symptoms worsen  -Continue current COPD medication regimen  -Continue CPAP therapy nightly  -Continue yearly lung screening with LDCT (next due 06/2024)         Relevant Medications    albuterol (PROVENTIL/VENTOLIN HFA) 90 mcg/actuation inhaler    budesonide-formoterol 160-4.5 mcg (SYMBICORT) 160-4.5 mcg/actuation HFAA    tiotropium (SPIRIVA WITH HANDIHALER) 18 mcg inhalation capsule    roflumilast (DALIRESP) 500 mcg Tab    albuterol-ipratropium (DUO-NEB) 2.5 mg-0.5 mg/3 mL nebulizer solution    predniSONE (DELTASONE) 5 MG tablet       Cardiac/Vascular    HTN (hypertension), benign     -Stable in office today  -Continue BP meds            Other    SHOLA (obstructive sleep apnea) - Primary     -Stable  -Continue CPAP therapy nightly          Other Visit Diagnoses       Upper respiratory tract infection, unspecified type        Relevant Medications    fluticasone propionate (FLONASE) 50 mcg/actuation nasal spray                       Follow up if symptoms worsen or fail to improve.    Discussed with patient above for education the following:      Patient Instructions   Continue current COPD medication  regimen; refills sent to your pharmacy    Continue CPAP therapy nightly    Continue yearly lung screening with LDCT (next due 06/2024)         Karyna Rodriguez, NP

## 2023-11-16 NOTE — ASSESSMENT & PLAN NOTE
-Stable   -PFTs from 2018 show moderate obstruction, mildly decreased DLCO; opts out of getting new PFTs; recommend if symptoms worsen  -Continue current COPD medication regimen  -Continue CPAP therapy nightly  -Continue yearly lung screening with LDCT (next due 06/2024)

## 2023-11-21 ENCOUNTER — PATIENT MESSAGE (OUTPATIENT)
Dept: CARDIOLOGY | Facility: CLINIC | Age: 78
End: 2023-11-21
Payer: MEDICARE

## 2023-12-01 DIAGNOSIS — K21.9 GASTROESOPHAGEAL REFLUX DISEASE WITHOUT ESOPHAGITIS: ICD-10-CM

## 2023-12-01 NOTE — TELEPHONE ENCOUNTER
----- Message from Lexus Torres sent at 12/1/2023  2:53 PM CST -----  Type:  RX Refill Request    Who Called: AMILCAR GREY JR. [[id number]]  Refill or New Rx:refill   RX Name and Strength:omeprazole (PRILOSEC) 20 MG capsule  How is the patient currently taking it? (ex. 1XDay):TAKE 1 CAPSULE BY MOUTH EVERY DAY  Is this a 30 day or 90 day RX:90  Preferred Pharmacy with phone number:[facility]/pharmacy #[id number] - [place], [place] - [address] AT [facility]   Phone: [phone]  Fax: [phone]  Local or Mail Order:local  Ordering Provider:bebo  Would the patient rather a call back or a response via [facility]? Call back   Best Call Back Number:[phone]  Additional Information: Patient indicates he needs to get his medication refilled. Patient idnicates the ordering provider is no longer within [facility]. Patient indicates he was recently seen and told his new provider is Dr. Hope. Patient would like to see if the provider or his previous provider can call this medication in. Patient indicates he needs to get this filled as soon as possible. Please call back with further assistance and more information if possible.

## 2023-12-04 RX ORDER — OMEPRAZOLE 20 MG/1
20 CAPSULE, DELAYED RELEASE ORAL DAILY
Qty: 90 CAPSULE | Refills: 3 | Status: SHIPPED | OUTPATIENT
Start: 2023-12-04

## 2023-12-06 ENCOUNTER — OFFICE VISIT (OUTPATIENT)
Dept: INTERNAL MEDICINE | Facility: CLINIC | Age: 78
End: 2023-12-06
Payer: MEDICARE

## 2023-12-06 VITALS
SYSTOLIC BLOOD PRESSURE: 124 MMHG | BODY MASS INDEX: 33.41 KG/M2 | HEART RATE: 56 BPM | TEMPERATURE: 98 F | HEIGHT: 70 IN | DIASTOLIC BLOOD PRESSURE: 74 MMHG | OXYGEN SATURATION: 97 % | WEIGHT: 233.38 LBS | RESPIRATION RATE: 18 BRPM

## 2023-12-06 DIAGNOSIS — I25.10 CORONARY ARTERY DISEASE INVOLVING NATIVE CORONARY ARTERY OF NATIVE HEART WITHOUT ANGINA PECTORIS: Chronic | ICD-10-CM

## 2023-12-06 DIAGNOSIS — I10 HTN (HYPERTENSION), BENIGN: Primary | ICD-10-CM

## 2023-12-06 DIAGNOSIS — J44.9 CHRONIC OBSTRUCTIVE PULMONARY DISEASE, UNSPECIFIED COPD TYPE: ICD-10-CM

## 2023-12-06 DIAGNOSIS — E78.5 DYSLIPIDEMIA: ICD-10-CM

## 2023-12-06 PROCEDURE — 99214 OFFICE O/P EST MOD 30 MIN: CPT | Mod: S$PBB,GC,,

## 2023-12-06 PROCEDURE — 99999 PR PBB SHADOW E&M-EST. PATIENT-LVL V: ICD-10-PCS | Mod: PBBFAC,GC,,

## 2023-12-06 PROCEDURE — 99999 PR PBB SHADOW E&M-EST. PATIENT-LVL V: CPT | Mod: PBBFAC,GC,,

## 2023-12-06 PROCEDURE — 99214 PR OFFICE/OUTPT VISIT, EST, LEVL IV, 30-39 MIN: ICD-10-PCS | Mod: S$PBB,GC,,

## 2023-12-06 PROCEDURE — 99215 OFFICE O/P EST HI 40 MIN: CPT | Mod: PBBFAC,PO

## 2023-12-06 RX ORDER — HYDROCHLOROTHIAZIDE 25 MG/1
12.5 TABLET ORAL DAILY
Qty: 30 TABLET | Refills: 6 | Status: SHIPPED | OUTPATIENT
Start: 2023-12-06 | End: 2024-03-18 | Stop reason: SDUPTHER

## 2023-12-06 RX ORDER — AMLODIPINE BESYLATE 5 MG/1
5 TABLET ORAL DAILY
Qty: 90 TABLET | Refills: 3 | Status: SHIPPED | OUTPATIENT
Start: 2023-12-06 | End: 2024-03-18 | Stop reason: SDUPTHER

## 2023-12-06 RX ORDER — LOSARTAN POTASSIUM 100 MG/1
100 TABLET ORAL DAILY
Qty: 90 TABLET | Refills: 2 | Status: SHIPPED | OUTPATIENT
Start: 2023-12-06 | End: 2024-03-18 | Stop reason: SDUPTHER

## 2023-12-06 NOTE — PROGRESS NOTES
"Subjective     Patient ID: Jordin Allen Jr. is a 78 y.o. male.    Chief Complaint: Annual Exam    MrGreg Allen (Ray) is a 78 y.o M with tobacco use disorder, emphysema/COPD, HTN, HLD, CAD, GERD, NAFLD, SHOLA who presents today for his annual physical exam.    HTN - On Amlodipine 5mg qd + HCTZ 25mg q.d + Losartan 100mg qd. Blood pressures at home 120s-130s/80s. BP today in clinic is 124/74    HLD/CAD - On ASA + Lipitor 80 + Losartan 100. No anginal symptoms. Can walk 150 yards before getting SOB (but will not have any CP). LDL at goal (46 in Oct 2023)    COPD - On Symbicort + Spiriva + Roflumilast + Prednisone 5mg every other day + PRN DuoNebs. Will use albuterol rescue inhaler with exertion (playing golf). As above, will get SOB after about 150 yards of walking. No SOB at rest. No night time symptoms. No recent ED/UC visits or hospitalizations for COPD. Saw Pulmonology in November.     SHOLA - Uses CPAP nightly    Tobacco Use Disorder - Started at 20 y.o. Quit in 2011. Smoked 1PPD. 46 pack year history.     Labs reviewed with patient.    Health Maintenance:  UTD on Vaccines   UTD on Colonoscopy  UTD on LDCT  UTD on lifetime HepC screening    Review of Systems   Constitutional:  Negative for chills, fatigue and fever.   HENT:  Negative for nasal congestion and rhinorrhea.    Eyes:  Negative for pain and visual disturbance.   Respiratory:  Positive for shortness of breath (with exertion) and wheezing (with exertion).    Cardiovascular:  Negative for chest pain and leg swelling.   Gastrointestinal:  Negative for abdominal pain, constipation, diarrhea, nausea and vomiting.   Genitourinary:  Negative for dysuria.   Musculoskeletal:  Negative for back pain.   Integumentary:  Negative for rash and wound.   Neurological:  Negative for dizziness and headaches.   Psychiatric/Behavioral:  Negative for confusion and decreased concentration.           Objective     Physical Exam  Vitals reviewed.   Constitutional:       " Appearance: Normal appearance. He is not ill-appearing.   HENT:      Head: Normocephalic.      Nose: Nose normal.      Mouth/Throat:      Mouth: Mucous membranes are moist.      Pharynx: Oropharynx is clear.   Eyes:      Extraocular Movements: Extraocular movements intact.      Conjunctiva/sclera: Conjunctivae normal.   Neck:      Comments: No JVD  Cardiovascular:      Rate and Rhythm: Normal rate and regular rhythm.      Pulses: Normal pulses.      Heart sounds: Normal heart sounds.   Pulmonary:      Effort: Pulmonary effort is normal.      Comments: Diffuse expiratory wheezing  Abdominal:      General: Bowel sounds are normal.      Palpations: Abdomen is soft.      Tenderness: There is no abdominal tenderness.   Musculoskeletal:         General: Normal range of motion.      Cervical back: Normal range of motion.      Comments: Mild pitting edema only on LLE to mid shin   Skin:     General: Skin is warm.      Capillary Refill: Capillary refill takes less than 2 seconds.   Neurological:      General: No focal deficit present.      Mental Status: He is alert. Mental status is at baseline.   Psychiatric:         Mood and Affect: Mood normal.         Behavior: Behavior normal.         Thought Content: Thought content normal.         Judgment: Judgment normal.        Assessment and Plan     1. HTN (hypertension), benign  - BP at goal, stable  - Continue current regimen  - Orders:  -     amLODIPine (NORVASC) 5 MG tablet; Take 1 tablet (5 mg total) by mouth once daily.  Dispense: 90 tablet; Refill: 3  -     hydroCHLOROthiazide (HYDRODIURIL) 25 MG tablet; Take 0.5 tablets (12.5 mg total) by mouth once daily.  Dispense: 30 tablet; Refill: 6  -     losartan (COZAAR) 100 MG tablet; Take 1 tablet (100 mg total) by mouth once daily.  Dispense: 90 tablet; Refill: 2    2. Coronary artery disease involving native coronary artery of native heart without angina pectoris  - Denies anginal symptoms  - Continue ASA, Statin, ARB. Not on  BB  - Follows with Dr. Ba  - Unilateral pitting edema seen on exam. No crackles or JVD. Low suspicion for CHF, but will obtain TTE out of abundance of caution  - Orders:  -     Echo; Future    3. Chronic obstructive pulmonary disease, unspecified COPD type  - On Symbicort + Spiriva + Pred 5 qod  - End expiratory wheezing heard on exam.   - Patient reports he's been unable to tolerate moving > 150 yards. Uses his rescue inhaler ~2-4x during a game of golf. No symptoms at rest.   - Needs updated PFTs and possibly pulmonary rehab. Discussed with patient that if his symptoms worsen, he needs to reach out to his pulmonologist regarding next steps      4. Dyslipidemia  - Stable. Last LDL at goal  - Continue Lipitor 80mg qhs    Greater than 45 minutes was spent today in review of patient's medical records, history/physical, counseling and MDM  Patient to RTC in 6 months for follow-up  Case discussed with Dr. Mccain

## 2023-12-07 ENCOUNTER — HOSPITAL ENCOUNTER (OUTPATIENT)
Dept: CARDIOLOGY | Facility: HOSPITAL | Age: 78
Discharge: HOME | End: 2023-12-07
Payer: MEDICARE

## 2023-12-07 VITALS
BODY MASS INDEX: 33.36 KG/M2 | DIASTOLIC BLOOD PRESSURE: 74 MMHG | SYSTOLIC BLOOD PRESSURE: 124 MMHG | HEART RATE: 80 BPM | HEIGHT: 70 IN | WEIGHT: 233 LBS

## 2023-12-07 DIAGNOSIS — I25.10 CORONARY ARTERY DISEASE INVOLVING NATIVE CORONARY ARTERY OF NATIVE HEART WITHOUT ANGINA PECTORIS: Chronic | ICD-10-CM

## 2023-12-07 LAB
ASCENDING AORTA: 3.59 CM
AV INDEX (PROSTH): 0.83
AV MEAN GRADIENT: 5 MMHG
AV PEAK GRADIENT: 10 MMHG
AV VALVE AREA BY VELOCITY RATIO: 2.72 CM²
AV VALVE AREA: 3.1 CM²
AV VELOCITY RATIO: 0.73
BSA FOR ECHO PROCEDURE: 2.28 M2
CV ECHO LV RWT: 0.34 CM
DOP CALC AO PEAK VEL: 1.58 M/S
DOP CALC AO VTI: 30.31 CM
DOP CALC LVOT AREA: 3.7 CM2
DOP CALC LVOT DIAMETER: 2.18 CM
DOP CALC LVOT PEAK VEL: 1.15 M/S
DOP CALC LVOT STROKE VOLUME: 94.09 CM3
DOP CALCLVOT PEAK VEL VTI: 25.22 CM
E/E' RATIO: 5.67 M/S
ECHO LV POSTERIOR WALL: 0.81 CM (ref 0.6–1.1)
FRACTIONAL SHORTENING: 39 % (ref 28–44)
INTERVENTRICULAR SEPTUM: 0.76 CM (ref 0.6–1.1)
IVC DIAMETER: 1.4 CM
IVRT: 65.65 MSEC
LA MAJOR: 4.92 CM
LA MINOR: 4.94 CM
LA WIDTH: 3.69 CM
LEFT ATRIUM SIZE: 3.46 CM
LEFT ATRIUM VOLUME INDEX MOD: 28.5 ML/M2
LEFT ATRIUM VOLUME INDEX: 24 ML/M2
LEFT ATRIUM VOLUME MOD: 63.48 CM3
LEFT ATRIUM VOLUME: 53.5 CM3
LEFT INTERNAL DIMENSION IN SYSTOLE: 2.88 CM (ref 2.1–4)
LEFT VENTRICLE DIASTOLIC VOLUME INDEX: 46.34 ML/M2
LEFT VENTRICLE DIASTOLIC VOLUME: 103.33 ML
LEFT VENTRICLE MASS INDEX: 54 G/M2
LEFT VENTRICLE SYSTOLIC VOLUME INDEX: 14.2 ML/M2
LEFT VENTRICLE SYSTOLIC VOLUME: 31.67 ML
LEFT VENTRICULAR INTERNAL DIMENSION IN DIASTOLE: 4.72 CM (ref 3.5–6)
LEFT VENTRICULAR MASS: 120.16 G
LV LATERAL E/E' RATIO: 5.23 M/S
LV SEPTAL E/E' RATIO: 6.18 M/S
MV A" WAVE DURATION": 17.7 MSEC
MV PEAK E VEL: 0.68 M/S
PISA TR MAX VEL: 2.06 M/S
PULM VEIN S/D RATIO: 1.44
PV PEAK D VEL: 0.36 M/S
PV PEAK S VEL: 0.52 M/S
RA MAJOR: 4.54 CM
RA PRESSURE ESTIMATED: 3 MMHG
RA WIDTH: 3.37 CM
RIGHT VENTRICULAR END-DIASTOLIC DIMENSION: 3.15 CM
RV TB RVSP: 5 MMHG
SINUS: 3.66 CM
STJ: 3.12 CM
TDI LATERAL: 0.13 M/S
TDI SEPTAL: 0.11 M/S
TDI: 0.12 M/S
TR MAX PG: 17 MMHG
TRICUSPID ANNULAR PLANE SYSTOLIC EXCURSION: 2.85 CM
TV REST PULMONARY ARTERY PRESSURE: 20 MMHG
Z-SCORE OF LEFT VENTRICULAR DIMENSION IN END DIASTOLE: -5.13
Z-SCORE OF LEFT VENTRICULAR DIMENSION IN END SYSTOLE: -4

## 2023-12-07 PROCEDURE — 93306 TTE W/DOPPLER COMPLETE: CPT

## 2023-12-07 PROCEDURE — 93306 ECHO (CUPID ONLY): ICD-10-PCS | Mod: 26,,, | Performed by: INTERNAL MEDICINE

## 2023-12-07 PROCEDURE — 93306 TTE W/DOPPLER COMPLETE: CPT | Mod: 26,,, | Performed by: INTERNAL MEDICINE

## 2024-01-24 RX ORDER — AMITRIPTYLINE HYDROCHLORIDE 25 MG/1
TABLET, FILM COATED ORAL
Qty: 90 TABLET | Refills: 1 | Status: SHIPPED | OUTPATIENT
Start: 2024-01-24 | End: 2024-03-07 | Stop reason: SDUPTHER

## 2024-02-02 ENCOUNTER — TELEPHONE (OUTPATIENT)
Dept: PULMONOLOGY | Facility: CLINIC | Age: 79
End: 2024-02-02
Payer: MEDICARE

## 2024-02-02 NOTE — TELEPHONE ENCOUNTER
Patient walked into clinic wanting to schedule an appointment for shortness of breath. Previous Karyna Rodriguez NP patient. Since Karyna is no longer here I tried to schedule with Dr Hope. Next available appointment in [place] was in May. I scheduled patient with Dr Owens on 2/8 in [facility].

## 2024-02-05 ENCOUNTER — OFFICE VISIT (OUTPATIENT)
Dept: PODIATRY | Facility: CLINIC | Age: 79
End: 2024-02-05
Payer: MEDICARE

## 2024-02-05 VITALS
HEART RATE: 99 BPM | TEMPERATURE: 98 F | BODY MASS INDEX: 33.36 KG/M2 | DIASTOLIC BLOOD PRESSURE: 82 MMHG | HEIGHT: 70 IN | RESPIRATION RATE: 18 BRPM | WEIGHT: 233 LBS | SYSTOLIC BLOOD PRESSURE: 128 MMHG

## 2024-02-05 DIAGNOSIS — M79.671 PAIN IN BOTH FEET: ICD-10-CM

## 2024-02-05 DIAGNOSIS — B35.1 TINEA UNGUIUM: Primary | ICD-10-CM

## 2024-02-05 DIAGNOSIS — M79.672 PAIN IN BOTH FEET: ICD-10-CM

## 2024-02-05 PROCEDURE — 99214 OFFICE O/P EST MOD 30 MIN: CPT | Mod: PBBFAC | Performed by: STUDENT IN AN ORGANIZED HEALTH CARE EDUCATION/TRAINING PROGRAM

## 2024-02-05 PROCEDURE — 99499 UNLISTED E&M SERVICE: CPT | Mod: S$PBB,,, | Performed by: STUDENT IN AN ORGANIZED HEALTH CARE EDUCATION/TRAINING PROGRAM

## 2024-02-05 PROCEDURE — 99999 PR PBB SHADOW E&M-EST. PATIENT-LVL IV: CPT | Mod: PBBFAC,,, | Performed by: STUDENT IN AN ORGANIZED HEALTH CARE EDUCATION/TRAINING PROGRAM

## 2024-02-05 PROCEDURE — 11720 DEBRIDE NAIL 1-5: CPT | Mod: PBBFAC | Performed by: STUDENT IN AN ORGANIZED HEALTH CARE EDUCATION/TRAINING PROGRAM

## 2024-02-05 NOTE — PROCEDURES
"Routine Foot Care    Date/Time: 2/5/2024 10:30 AM    Performed by: Garrett Lloyd DPM  Authorized by: Garrett Lloyd DPM    Time out: Immediately prior to procedure a "time out" was called to verify the correct patient, procedure, equipment, support staff and site/side marked as required.    Consent Done?:  Yes (Verbal)    Nail Care Type:  Debride(Left 2nd Toe and Right 2nd Toe)  Patient tolerance:  Patient tolerated the procedure well with no immediate complications     Other nails trimmed as a courtesy. Calluses debrided as a courtesy.    RTC in 3 months for routine foot care    "

## 2024-02-08 ENCOUNTER — OFFICE VISIT (OUTPATIENT)
Dept: PULMONOLOGY | Facility: CLINIC | Age: 79
End: 2024-02-08
Payer: MEDICARE

## 2024-02-08 VITALS
BODY MASS INDEX: 33.14 KG/M2 | SYSTOLIC BLOOD PRESSURE: 162 MMHG | HEART RATE: 94 BPM | OXYGEN SATURATION: 99 % | WEIGHT: 231.5 LBS | HEIGHT: 70 IN | DIASTOLIC BLOOD PRESSURE: 90 MMHG

## 2024-02-08 DIAGNOSIS — J43.2 CENTRILOBULAR EMPHYSEMA: ICD-10-CM

## 2024-02-08 DIAGNOSIS — G47.33 OSA (OBSTRUCTIVE SLEEP APNEA): ICD-10-CM

## 2024-02-08 DIAGNOSIS — J44.1 CHRONIC OBSTRUCTIVE PULMONARY DISEASE WITH ACUTE EXACERBATION: ICD-10-CM

## 2024-02-08 DIAGNOSIS — J06.9 UPPER RESPIRATORY TRACT INFECTION, UNSPECIFIED TYPE: ICD-10-CM

## 2024-02-08 DIAGNOSIS — I25.10 CORONARY ARTERY DISEASE INVOLVING NATIVE CORONARY ARTERY OF NATIVE HEART WITHOUT ANGINA PECTORIS: Primary | Chronic | ICD-10-CM

## 2024-02-08 DIAGNOSIS — Z87.891 HISTORY OF SMOKING 30 OR MORE PACK YEARS: ICD-10-CM

## 2024-02-08 PROCEDURE — 99214 OFFICE O/P EST MOD 30 MIN: CPT | Mod: S$PBB,,, | Performed by: INTERNAL MEDICINE

## 2024-02-08 PROCEDURE — 99213 OFFICE O/P EST LOW 20 MIN: CPT | Mod: PBBFAC | Performed by: INTERNAL MEDICINE

## 2024-02-08 PROCEDURE — 99999 PR PBB SHADOW E&M-EST. PATIENT-LVL III: CPT | Mod: PBBFAC,,, | Performed by: INTERNAL MEDICINE

## 2024-02-08 PROCEDURE — G2211 COMPLEX E/M VISIT ADD ON: HCPCS | Mod: S$PBB,,, | Performed by: INTERNAL MEDICINE

## 2024-02-08 RX ORDER — BUDESONIDE, GLYCOPYRROLATE, AND FORMOTEROL FUMARATE 160; 9; 4.8 UG/1; UG/1; UG/1
2 AEROSOL, METERED RESPIRATORY (INHALATION) 2 TIMES DAILY
Qty: 10.7 G | Refills: 3 | Status: SHIPPED | OUTPATIENT
Start: 2024-02-08 | End: 2024-06-17 | Stop reason: SDUPTHER

## 2024-02-08 RX ORDER — ALBUTEROL SULFATE 0.63 MG/3ML
0.63 SOLUTION RESPIRATORY (INHALATION) EVERY 6 HOURS PRN
Qty: 375 ML | Refills: 11 | Status: SHIPPED | OUTPATIENT
Start: 2024-02-08

## 2024-02-08 RX ORDER — ALBUTEROL SULFATE 90 UG/1
2 AEROSOL, METERED RESPIRATORY (INHALATION) EVERY 4 HOURS PRN
Qty: 8.5 G | Refills: 11 | Status: SHIPPED | OUTPATIENT
Start: 2024-02-08

## 2024-02-08 RX ORDER — ROFLUMILAST 500 UG/1
1 TABLET ORAL DAILY
Qty: 90 TABLET | Refills: 3 | Status: SHIPPED | OUTPATIENT
Start: 2024-02-08

## 2024-02-08 RX ORDER — FLUTICASONE PROPIONATE 50 MCG
1 SPRAY, SUSPENSION (ML) NASAL DAILY
Qty: 16 G | Refills: 11 | Status: ON HOLD | OUTPATIENT
Start: 2024-02-08 | End: 2024-03-22

## 2024-02-08 NOTE — ASSESSMENT & PLAN NOTE
Chronic complex condition with long term management plan  Severe obstruction in 2018. Quit smoking 2010.  Needs to be off Prednisone as associated with increased risk of death in COPD but currently suboptimal control.  Change to Breztri; if no improvement, try Trelegy.  Stop ipratropium as competively inhibits LAMA  Albuterol MDI and Nebs PRN  Continue Daliresp currently; unclear if will require long term  PFTs and 6MWD now

## 2024-02-08 NOTE — PROGRESS NOTES
Subjective:       Patient ID: Jordin Allen Jr. is a 78 y.o. male.  The patient's last visit with me was on Visit date not found.     Last seen by Dr Mohr in 2018; saw Pulm NP last year.    COPD Dxe3d in 2010 and quit at that time. Has been on Symbicort and Spirva handihaler since then. Has been on Daliresp for 10 years. Has been on Pred 5 QOD for many years. Uses Duoneb inhalers 2-4x/day.     Suboptimal control with significant exercise limitations especially with golfing. Some LH and dizziness when he pushes himself. Chest tightness at night.    Went over inhaler technique  Review of Systems    Objective:      Vitals:    02/08/24 0907   BP: (!) 162/90   Pulse: 94     Wt Readings from Last 3 Encounters:   02/08/24 105 kg (231 lb 7.7 oz)   02/05/24 105.7 kg (233 lb 0.4 oz)   12/07/23 105.7 kg (233 lb)     Temp Readings from Last 3 Encounters:   02/05/24 97.9 °F (36.6 °C) (Oral)   12/06/23 97.8 °F (36.6 °C) (Other (see comments))   11/06/23 98.8 °F (37.1 °C)     BP Readings from Last 3 Encounters:   02/08/24 (!) 162/90   02/05/24 128/82   12/07/23 124/74     Pulse Readings from Last 3 Encounters:   02/08/24 94   02/05/24 99   12/07/23 80       Physical Exam   Constitutional: He is oriented to person, place, and time. He appears well-developed and well-nourished.   HENT:   Head: Normocephalic.   Mouth/Throat: Oropharynx is clear and moist.   Cardiovascular: Normal rate and regular rhythm.   Pulmonary/Chest: Normal expansion, symmetric chest wall expansion, effort normal and breath sounds normal. He has no wheezes (adequate air movement).   Abdominal: Soft. He exhibits no distension.   Musculoskeletal:         General: No edema. Normal range of motion.   Lymphadenopathy: No supraclavicular adenopathy is present.     He has no cervical adenopathy.   Neurological: He is alert and oriented to person, place, and time. Gait normal.   Skin: Skin is warm and dry. No rash noted.   Psychiatric: He has a normal mood and  affect. His behavior is normal. Thought content normal.   Vitals reviewed.    CBC  Lab Results   Component Value Date    WBC 10.38 10/12/2023    HGB 13.6 (L) 10/12/2023    HCT 43.7 10/12/2023    MCV 82 10/12/2023     10/12/2023         CMP  Sodium   Date Value Ref Range Status   10/12/2023 142 136 - 145 mmol/L Final     Potassium   Date Value Ref Range Status   10/12/2023 4.4 3.5 - 5.1 mmol/L Final     Chloride   Date Value Ref Range Status   10/12/2023 105 95 - 110 mmol/L Final     CO2   Date Value Ref Range Status   10/12/2023 27 23 - 29 mmol/L Final     Glucose   Date Value Ref Range Status   10/12/2023 81 70 - 110 mg/dL Final     BUN   Date Value Ref Range Status   10/12/2023 21 8 - 23 mg/dL Final     Creatinine   Date Value Ref Range Status   10/12/2023 1.0 0.5 - 1.4 mg/dL Final     Calcium   Date Value Ref Range Status   10/12/2023 9.5 8.7 - 10.5 mg/dL Final     Total Protein   Date Value Ref Range Status   10/12/2023 7.2 6.0 - 8.4 g/dL Final     Albumin   Date Value Ref Range Status   10/12/2023 3.7 3.5 - 5.2 g/dL Final     Total Bilirubin   Date Value Ref Range Status   10/12/2023 0.8 0.1 - 1.0 mg/dL Final     Comment:     For infants and newborns, interpretation of results should be based  on gestational age, weight and in agreement with clinical  observations.    Premature Infant recommended reference ranges:  Up to 24 hours.............<8.0 mg/dL  Up to 48 hours............<12.0 mg/dL  3-5 days..................<15.0 mg/dL  6-29 days.................<15.0 mg/dL       Alkaline Phosphatase   Date Value Ref Range Status   10/12/2023 115 55 - 135 U/L Final     AST   Date Value Ref Range Status   10/12/2023 21 10 - 40 U/L Final     ALT   Date Value Ref Range Status   10/12/2023 26 10 - 44 U/L Final     Anion Gap   Date Value Ref Range Status   10/12/2023 10 8 - 16 mmol/L Final     eGFR   Date Value Ref Range Status   10/12/2023 >60.0 >60 mL/min/1.73 m^2 Final       ABG  POC PH   Date Value Ref Range  "Status   04/02/2013 7.476 (H) 7.35 - 7.45 Final     POC PO2   Date Value Ref Range Status   04/02/2013 76 (L) 80 - 100 mmHg Final     POC PCO2   Date Value Ref Range Status   04/02/2013 32.4 (L) 35 - 45 mmHg Final           Personal Diagnostic Review  I have personally reviewed the following data and added my own interpretation as below:  CT of chest performed on June 2023 without contrast revealed emphysema, no concerning nodules or masses.  Echocardiogram: nl EF, no PH  Spirometry: severe obstruction- 2018 2/8/2024     9:07 AM 2/5/2024    10:35 AM 12/7/2023     9:15 AM 12/6/2023     1:20 PM 11/16/2023     8:18 AM 11/6/2023    10:39 AM 8/31/2023     9:05 AM   Pulmonary Function Tests   SpO2 99 %   97 % 98 %     Height 5' 10" (1.778 m) 5' 10" (1.778 m) 5' 10" (1.778 m) 5' 10" (1.778 m) 5' 10" (1.778 m) 5' 10" (1.778 m) 5' 10" (1.778 m)   Weight 105 kg (231 lb 7.7 oz) 105.7 kg (233 lb 0.4 oz) 105.7 kg (233 lb) 105.8 kg (233 lb 5.7 oz) 105.7 kg (233 lb) 106.6 kg (235 lb 0.2 oz) 106 kg (233 lb 11 oz)   BMI (Calculated) 33.2 33.4 33.4 33.5 33.4 33.7 33.5         Assessment:       1. Coronary artery disease involving native coronary artery of native heart without angina pectoris    2. Centrilobular emphysema    3. Upper respiratory tract infection, unspecified type    4. Chronic obstructive pulmonary disease with acute exacerbation    5. SHOLA (obstructive sleep apnea)    6. History of smoking 30 or more pack years        Outpatient Encounter Medications as of 2/8/2024   Medication Sig Dispense Refill    amitriptyline (ELAVIL) 25 MG tablet TAKE 1 TABLET BY MOUTH EVERY DAY 90 tablet 1    amLODIPine (NORVASC) 5 MG tablet Take 1 tablet (5 mg total) by mouth once daily. 90 tablet 3    aspirin (ECOTRIN) 81 MG EC tablet Take 81 mg by mouth once daily.       atorvastatin (LIPITOR) 80 MG tablet TAKE 1 TABLET (80 MG TOTAL) BY MOUTH ONCE DAILY. 90 tablet 3    BETA-CAROTENE,A, W-C & E/MIN (OCUVITE ORAL) Take 1 capsule by " mouth once daily.       echinacea 400 mg Cap Take 1 capsule by mouth once daily.       hydroCHLOROthiazide (HYDRODIURIL) 25 MG tablet Take 0.5 tablets (12.5 mg total) by mouth once daily. 30 tablet 6    ketoconazole (NIZORAL) 2 % cream Apply topically once daily. 30 g 1    latanoprost 0.005 % ophthalmic solution Place 1 drop into both eyes once daily.       losartan (COZAAR) 100 MG tablet Take 1 tablet (100 mg total) by mouth once daily. 90 tablet 2    nebulizers Misc       nitroGLYCERIN (NITROSTAT) 0.4 MG SL tablet Place 1 tablet (0.4 mg total) under the tongue every 5 (five) minutes as needed. 100 tablet 3    omeprazole (PRILOSEC) 20 MG capsule Take 1 capsule (20 mg total) by mouth once daily. 90 capsule 3    predniSONE (DELTASONE) 5 MG tablet TAKE 1 TABLET BY MOUTH EVERY OTHER DAY. TAKE WITH FOOD 30 tablet 11    urea (CARMOL) 40 % Crea Apply topically once daily. 85 g 10    [DISCONTINUED] albuterol (PROVENTIL/VENTOLIN HFA) 90 mcg/actuation inhaler Inhale 2 puffs into the lungs every 4 (four) hours as needed for Wheezing. 8 g 11    [DISCONTINUED] albuterol-ipratropium (DUO-NEB) 2.5 mg-0.5 mg/3 mL nebulizer solution INHALE 1 VIAL VIA NEBULIZER EVERY 6 HOURS AS NEEDEDILF 90 mL 11    [DISCONTINUED] budesonide-formoterol 160-4.5 mcg (SYMBICORT) 160-4.5 mcg/actuation HFAA Inhale 2 puffs into the lungs every 12 (twelve) hours. 2 puffs bid generic please 10.2 g 11    [DISCONTINUED] fluticasone propionate (FLONASE) 50 mcg/actuation nasal spray 1 spray (50 mcg total) by Each Nostril route once daily. 16 g 11    [DISCONTINUED] roflumilast (DALIRESP) 500 mcg Tab Take 1 tablet (500 mcg total) by mouth once daily. 90 tablet 3    [DISCONTINUED] tiotropium (SPIRIVA WITH HANDIHALER) 18 mcg inhalation capsule Inhale 1 capsule (18 mcg total) into the lungs once daily. With inhalation device 30 capsule 11    albuterol (ACCUNEB) 0.63 mg/3 mL Nebu Take 3 mLs (0.63 mg total) by nebulization every 6 (six) hours as  needed (if symptoms failed to improve with inhaler). Rescue 360 mL 11    albuterol (PROVENTIL/VENTOLIN HFA) 90 mcg/actuation inhaler Inhale 2 puffs into the lungs every 4 (four) hours as needed for Wheezing. 8.5 g 11    budesonide-glycopyr-formoterol (BREZTRI AEROSPHERE) 160-9-4.8 mcg/actuation HFAA Inhale 2 puffs into the lungs 2 (two) times a day. 10.7 g 3    fluticasone propionate (FLONASE) 50 mcg/actuation nasal spray 1 spray (50 mcg total) by Each Nostril route once daily. 16 g 11    roflumilast (DALIRESP) 500 mcg Tab Take 1 tablet (500 mcg total) by mouth once daily. 90 tablet 3     No facility-administered encounter medications on file as of 2/8/2024.     1. Centrilobular emphysema  - albuterol (PROVENTIL/VENTOLIN HFA) 90 mcg/actuation inhaler; Inhale 2 puffs into the lungs every 4 (four) hours as needed for Wheezing.  Dispense: 8.5 g; Refill: 11  - roflumilast (DALIRESP) 500 mcg Tab; Take 1 tablet (500 mcg total) by mouth once daily.  Dispense: 90 tablet; Refill: 3    2. Upper respiratory tract infection, unspecified type  - fluticasone propionate (FLONASE) 50 mcg/actuation nasal spray; 1 spray (50 mcg total) by Each Nostril route once daily.  Dispense: 16 g; Refill: 11    3. Chronic obstructive pulmonary disease with acute exacerbation  - roflumilast (DALIRESP) 500 mcg Tab; Take 1 tablet (500 mcg total) by mouth once daily.  Dispense: 90 tablet; Refill: 3  - Complete PFT with bronchodilator; Future  - Stress test, pulmonary; Future    4. Coronary artery disease involving native coronary artery of native heart without angina pectoris    5. SHOLA (obstructive sleep apnea)    6. History of smoking 30 or more pack years    Plan:     Problem List Items Addressed This Visit          Pulmonary    Centrilobular emphysema (Chronic)    Relevant Medications    albuterol (PROVENTIL/VENTOLIN HFA) 90 mcg/actuation inhaler    roflumilast (DALIRESP) 500 mcg Tab    Chronic obstructive pulmonary disease    Overview     73  yo male, former  comes for his periodic pulmonary visit. He saw Dr. Estrada in April and has done well, with no COPD exacerbations. He recently moved to [place] to be closer to his daughter. He uses symbicort and spiriva and his maintenance medications. Peak flow 270 l/min  In April his Fev-1: 1.33 liters 47%. He is an avid golfer and plays 3-4 times a week weather permitting. He has a single cardiac stent.  Proximal LAD was occluded and was opened and a stent placed, 10/31.13  No further symptoms.         Current Assessment & Plan     Chronic complex condition with long term management plan  Severe obstruction in 2018. Quit smoking 2010.  Needs to be off Prednisone as associated with increased risk of death in COPD but currently suboptimal control.  Change to Breztri; if no improvement, try Trelegy.  Stop ipratropium as competively inhibits LAMA  Albuterol MDI and Nebs PRN  Continue Daliresp currently; unclear if will require long term  PFTs and 6MWD now         Relevant Medications    albuterol (PROVENTIL/VENTOLIN HFA) 90 mcg/actuation inhaler    roflumilast (DALIRESP) 500 mcg Tab    Other Relevant Orders    Complete PFT with bronchodilator    Stress test, pulmonary       Cardiac/Vascular    CAD (coronary artery disease) - Primary (Chronic)    Overview     -[facility] (10/31/13) for stemi: pLAD 100%, Cx with Li's, mRCA 40%, LVEF 55%,. LVEDP 15   -Xience 3.0 x 33 mm to pLAD, post dilated with 3.5 NC and 4.0 NC.   -TTE (8/14/13): LVEF 55%, grade I diastolic dysfunction         Current Assessment & Plan     Ok to use beta 1 selective beta blockers if needed, avoid nono-selective beta blockers            Other    SHOLA (obstructive sleep apnea)    Current Assessment & Plan     Cont CPAP. Contributed to symptoms         History of smoking 30 or more pack years    Current Assessment & Plan     LDCT yearly through 2025, next in June 2024          Other Visit Diagnoses       Upper respiratory tract infection,  unspecified type        Relevant Medications    fluticasone propionate (FLONASE) 50 mcg/actuation nasal spray              No follow-ups on file.    Future Appointments   Date Time Provider Department Center   3/6/2024  9:00 AM Sierra Landry MD [facility] IM [place]   3/18/2024  9:30 AM Prince Ba MD [facility] CARDIO [place]   5/6/2024 10:30 AM Garrett Lloyd DPM [facility] [place]         Bienvenido Ward MD

## 2024-02-29 ENCOUNTER — EXTERNAL CHRONIC CARE MANAGEMENT (OUTPATIENT)
Dept: PRIMARY CARE CLINIC | Facility: CLINIC | Age: 79
End: 2024-02-29
Payer: MEDICARE

## 2024-02-29 PROCEDURE — 99490 CHRNC CARE MGMT STAFF 1ST 20: CPT | Mod: S$PBB,,, | Performed by: INTERNAL MEDICINE

## 2024-02-29 PROCEDURE — 99490 CHRNC CARE MGMT STAFF 1ST 20: CPT | Mod: PBBFAC,PO | Performed by: INTERNAL MEDICINE

## 2024-03-05 PROBLEM — J96.11 CHRONIC RESPIRATORY FAILURE WITH HYPOXIA: Status: ACTIVE | Noted: 2024-03-05

## 2024-03-05 NOTE — PROGRESS NOTES
Subjective:      Chief Complaint: transfer of care, elevated heart rate, and Medication Refill    HPI  Mr.William Tiffany Erwin is an exceptionally nice 78-year-old man with centrilobular emphysema/COPD with secondary chronic hypoxic respiratory failure (following with pulmonology), coronary artery disease/aortic atherosclerosis/hyperlipidemia (atorvastatin 80), hypertension (hydrochlorothiazide 25 and losartan 100), distant albeit significant smoking history (quit 12 years ago) GERD (omeprazole 20), and obstructive sleep apnea (using home CPAP) presenting to transition primary care and for annual physical:    Most recent annual screening labs are 5-months-old and independently interpreted as below:   -A1c, TSH, PSA, and CMP: Within normal limits   -lipid panel:  Extremely well controlled   -CBC:  Low-grade/stable abnormalities only    Tachycardia/palpitations:  - describing episodes of SOB and palpitations while playing golf (no episodes without exertion)  - echocardiogram performed three-month ago was entirely reassuring  - no associated presyncope/syncope  - has seen Pulm (on both a controller and rescue) for these symptoms and has PFT's and cardiology follow up upcoming     Family, social, surgical Hx reviewed     Health Maintenance:  - up-to-date although will require repeat annual low-dose CT chest in three-month    Past Medical History:   Diagnosis Date    Benign neoplasm of colon 10/01/2013    Bronchitis chronic     CAD (coronary artery disease) 10/31/2013    COPD (chronic obstructive pulmonary disease)     Emphysema of lung     GERD (gastroesophageal reflux disease)     Hx of colonic polyps     Hypertension     NAFLD (nonalcoholic fatty liver disease) 03/27/2017    SHOLA on CPAP     RLS (restless legs syndrome)     Screen for colon cancer 08/30/2013     Past Surgical History:   Procedure Laterality Date    bilateral foot surgery  1993    CARDIAC CATHETERIZATION  2013    x1    CATARACT EXTRACTION, BILATERAL       COLON SURGERY      Ace Mott MD    COLONOSCOPY N/A 3/21/2018    Procedure: COLONOSCOPY;  Surgeon: Terry Mott MD;  [facility]  Service: Endoscopy;  Laterality: N/A;    COLONOSCOPY N/A 2019    Procedure: COLONOSCOPY;  Surgeon: John Mantilla MD;  Location[facility]  Service: Endoscopy;  Laterality: N/A;    COLONOSCOPY N/A 2022    Procedure: COLONOSCOPY;  Surgeon: MEDINA Farris MD;  Location[facility]  Service: Endoscopy;  Laterality: N/A;  fully vacc-inst portal-tb    scrotal abscess removal       Family History   Problem Relation Age of Onset    Heart disease Mother     Heart attack Mother     Hypertension Mother     No Known Problems Sister     No Known Problems Daughter     Asthma Neg Hx     Emphysema Neg Hx     Prostate cancer Neg Hx     Cirrhosis Neg Hx      Social History     Socioeconomic History    Marital status:    Tobacco Use    Smoking status: Former     Current packs/day: 0.00     Average packs/day: 1 pack/day for 40.0 years (40.0 ttl pk-yrs)     Types: Cigarettes     Start date: 8/15/1972     Quit date: 8/15/2012     Years since quittin.5     Passive exposure: Never    Smokeless tobacco: Never   Substance and Sexual Activity    Alcohol use: Yes     Alcohol/week: 3.0 standard drinks of alcohol     Types: 3 Cans of beer per week     Comment: maybe 2-3  beers weekly    Drug use: No    Sexual activity: Yes     Partners: Female   Social History Narrative    Retired .  .       Social Determinants of Health     Financial Resource Strain: Low Risk  (2023)    Overall Financial Resource Strain (CARDIA)     Difficulty of Paying Living Expenses: Not hard at all   Food Insecurity: No Food Insecurity (2023)    Hunger Vital Sign     Worried About Running Out of Food in the Last Year: Never true     Ran Out of Food in the Last Year: Never true   Transportation Needs: No Transportation Needs (2023)     PRAPARE - Transportation     Lack of Transportation (Medical): No     Lack of Transportation (Non-Medical): No   Physical Activity: Inactive (12/1/2023)    Exercise Vital Sign     Days of Exercise per Week: 0 days     Minutes of Exercise per Session: 0 min   Stress: No Stress Concern Present (12/1/2023)    [employer] - Occupational Stress Questionnaire     Feeling of Stress : Not at all   Social Connections: Unknown (12/1/2023)    Social Connection and Isolation Panel [NHANES]     Frequency of Communication with Friends and Family: More than three times a week     Frequency of Social Gatherings with Friends and Family: More than three times a week     Marital Status:    Housing Stability: Low Risk  (12/1/2023)    Housing Stability Vital Sign     Unable to Pay for Housing in the Last Year: No     Number of Places Lived in the Last Year: 1     Unstable Housing in the Last Year: No     Review of patient's allergies indicates:   Allergen Reactions    Brilinta [ticagrelor] Itching    Lisinopril      Other reaction(s): cough    Metoprolol succinate Rash     Sharan Allen Jr. had no medications administered during this visit.      Review of Systems   Constitutional:  Negative for appetite change, chills and fever.   HENT: Negative.     Respiratory:  Positive for shortness of breath. Negative for cough and chest tightness.    Cardiovascular:  Positive for palpitations. Negative for chest pain and leg swelling.   Gastrointestinal:  Negative for abdominal distention, abdominal pain, blood in stool, constipation, diarrhea, nausea and vomiting.   Endocrine: Negative.    Genitourinary:  Negative for difficulty urinating, dysuria, frequency and hematuria.   Musculoskeletal: Negative.    Integumentary:  Negative.   Neurological:  Positive for light-headedness.   Psychiatric/Behavioral: Negative.           Objective:      Vitals:    03/06/24 0843   BP: 136/76   Pulse: 99   Resp: 18   Temp: 98.5  °F (36.9 °C)      Physical Exam  Vitals reviewed.   Constitutional:       General: He is not in acute distress.     Appearance: Normal appearance.   HENT:      Head: Normocephalic and atraumatic.      Comments: Facial features are symmetric      Nose: Nose normal. No congestion or rhinorrhea.      Mouth/Throat:      Mouth: Mucous membranes are moist.      Pharynx: Oropharynx is clear. No oropharyngeal exudate or posterior oropharyngeal erythema.   Eyes:      General: No scleral icterus.     Extraocular Movements: Extraocular movements intact.      Conjunctiva/sclera: Conjunctivae normal.   Cardiovascular:      Rate and Rhythm: Normal rate and regular rhythm.      Pulses: Normal pulses.      Heart sounds: Normal heart sounds.   Pulmonary:      Effort: Pulmonary effort is normal. No respiratory distress.      Comments: Modest reduction in airflow diffusely  Musculoskeletal:         General: No deformity or signs of injury. Normal range of motion.      Cervical back: Normal range of motion.      Comments: Gait normal    Skin:     General: Skin is warm and dry.      Findings: No rash.   Neurological:      General: No focal deficit present.      Mental Status: He is alert and oriented to person, place, and time. Mental status is at baseline.   Psychiatric:         Mood and Affect: Mood normal.         Behavior: Behavior normal.         Thought Content: Thought content normal.       Current Outpatient Medications on File Prior to Visit   Medication Sig Dispense Refill    albuterol (ACCUNEB) 0.63 mg/3 mL Nebu Take 3 mLs (0.63 mg total) by nebulization every 6 (six) hours as needed (if symptoms failed to improve with inhaler). Rescue 375 mL 11    albuterol (PROVENTIL/VENTOLIN HFA) 90 mcg/actuation inhaler Inhale 2 puffs into the lungs every 4 (four) hours as needed for Wheezing. 8.5 g 11    amitriptyline (ELAVIL) 25 MG tablet TAKE 1 TABLET BY MOUTH EVERY DAY 90 tablet 1    amLODIPine (NORVASC) 5 MG tablet Take 1 tablet (5  mg total) by mouth once daily. 90 tablet 3    aspirin (ECOTRIN) 81 MG EC tablet Take 81 mg by mouth once daily.       atorvastatin (LIPITOR) 80 MG tablet TAKE 1 TABLET (80 MG TOTAL) BY MOUTH ONCE DAILY. 90 tablet 3    BETA-CAROTENE,A, W-C & E/MIN (OCUVITE ORAL) Take 1 capsule by mouth once daily.       budesonide-glycopyr-formoterol (BREZTRI AEROSPHERE) 160-9-4.8 mcg/actuation HFAA Inhale 2 puffs into the lungs 2 (two) times a day. 10.7 g 3    echinacea 400 mg Cap Take 1 capsule by mouth once daily.       fluticasone propionate (FLONASE) 50 mcg/actuation nasal spray 1 spray (50 mcg total) by Each Nostril route once daily. 16 g 11    hydroCHLOROthiazide (HYDRODIURIL) 25 MG tablet Take 0.5 tablets (12.5 mg total) by mouth once daily. 30 tablet 6    ketoconazole (NIZORAL) 2 % cream Apply topically once daily. 30 g 1    latanoprost 0.005 % ophthalmic solution Place 1 drop into both eyes once daily.       losartan (COZAAR) 100 MG tablet Take 1 tablet (100 mg total) by mouth once daily. 90 tablet 2    nebulizers Misc       nitroGLYCERIN (NITROSTAT) 0.4 MG SL tablet Place 1 tablet (0.4 mg total) under the tongue every 5 (five) minutes as needed. 100 tablet 3    omeprazole (PRILOSEC) 20 MG capsule Take 1 capsule (20 mg total) by mouth once daily. 90 capsule 3    predniSONE (DELTASONE) 5 MG tablet TAKE 1 TABLET BY MOUTH EVERY OTHER DAY. TAKE WITH FOOD 30 tablet 11    roflumilast (DALIRESP) 500 mcg Tab Take 1 tablet (500 mcg total) by mouth once daily. 90 tablet 3    urea (CARMOL) 40 % Crea Apply topically once daily. 85 g 10     No current facility-administered medications on file prior to visit.         Assessment:       1. Physical exam, annual    2. Chronic respiratory failure with hypoxia    3. Aortic atherosclerosis    4. Dyslipidemia    5. HTN (hypertension), benign    6. Coronary artery disease involving native coronary artery of native heart without angina pectoris    7. Abnormal fasting glucose    8. Vitamin D  deficiency    9. SOB (shortness of breath)    10. History of cigarette smoking        Plan:       Physical exam, annual  -     CBC Auto Differential; Future; Expected date: 03/06/2024  -     Comprehensive Metabolic Panel; Future; Expected date: 03/06/2024  -     Hemoglobin A1C; Future; Expected date: 03/06/2024  -     Lipid Panel; Future; Expected date: 03/06/2024  -     T4; Future; Expected date: 03/06/2024  -     TSH; Future; Expected date: 03/06/2024  -     Vitamin D; Future; Expected date: 03/06/2024  -     B-TYPE NATRIURETIC PEPTIDE; Future; Expected date: 03/06/2024  -     Troponin I; Future; Expected date: 03/06/2024   - primary care assumed   - BNP and troponin added to annual screening labs    Chronic respiratory failure with hypoxia  Aortic atherosclerosis  SOB (shortness of breath)  Coronary artery disease involving native coronary artery of native heart without angina pectoris  -     B-TYPE NATRIURETIC PEPTIDE; Future; Expected date: 03/06/2024  -     Troponin I; Future; Expected date: 03/06/2024   - will screen for signs of cardiac strain, follow along with both pulmonology and Cardiology at upcoming appointments, and continue to aggressively treat all modifiable cardiovascular risk factors   - shortness of breath and palpitations exertion seem most likely secondary to COPD given pattern of symptoms and recently reassuring echocardiogram; however, given his daughter's recent rheumatologic diagnosis, will add a systemic anti-inflammatory panel to annual screening labs and facilitate dermatology establishment if need be.      Dyslipidemia  -     Lipid Panel; Future; Expected date: 03/06/2024    HTN (hypertension), benign  -     CBC Auto Differential; Future; Expected date: 03/06/2024  -     Comprehensive Metabolic Panel; Future; Expected date: 03/06/2024  -     T4; Future; Expected date: 03/06/2024  -     TSH; Future; Expected date: 03/06/2024    Abnormal fasting glucose  -     Hemoglobin A1C; Future;  Expected date: 03/06/2024    Vitamin D deficiency  -     Vitamin D; Future; Expected date: 03/06/2024    History of cigarette smoking  -     CT Chest Lung Screening Low Dose; Future; Expected date: 06/03/2024   - will need an at least 3 more annual low-dose CT chests; ordered to be scheduled in approximately three-months       Return to clinic in one year for annual exam or sooner if dictated by labs or illness.

## 2024-03-06 ENCOUNTER — LAB VISIT (OUTPATIENT)
Dept: LAB | Facility: HOSPITAL | Age: 79
End: 2024-03-06
Attending: STUDENT IN AN ORGANIZED HEALTH CARE EDUCATION/TRAINING PROGRAM
Payer: MEDICARE

## 2024-03-06 ENCOUNTER — OFFICE VISIT (OUTPATIENT)
Dept: INTERNAL MEDICINE | Facility: CLINIC | Age: 79
End: 2024-03-06
Payer: MEDICARE

## 2024-03-06 VITALS
BODY MASS INDEX: 32.57 KG/M2 | SYSTOLIC BLOOD PRESSURE: 136 MMHG | TEMPERATURE: 99 F | DIASTOLIC BLOOD PRESSURE: 76 MMHG | OXYGEN SATURATION: 97 % | HEART RATE: 99 BPM | HEIGHT: 70 IN | WEIGHT: 227.5 LBS | RESPIRATION RATE: 18 BRPM

## 2024-03-06 DIAGNOSIS — Z00.00 PHYSICAL EXAM, ANNUAL: Primary | ICD-10-CM

## 2024-03-06 DIAGNOSIS — R73.01 ABNORMAL FASTING GLUCOSE: ICD-10-CM

## 2024-03-06 DIAGNOSIS — Z87.891 HISTORY OF CIGARETTE SMOKING: ICD-10-CM

## 2024-03-06 DIAGNOSIS — E55.9 VITAMIN D DEFICIENCY: ICD-10-CM

## 2024-03-06 DIAGNOSIS — Z82.69 FAMILY HISTORY OF SCLERODERMA: ICD-10-CM

## 2024-03-06 DIAGNOSIS — R06.02 SOB (SHORTNESS OF BREATH): ICD-10-CM

## 2024-03-06 DIAGNOSIS — E78.5 DYSLIPIDEMIA: ICD-10-CM

## 2024-03-06 DIAGNOSIS — Z00.00 PHYSICAL EXAM, ANNUAL: ICD-10-CM

## 2024-03-06 DIAGNOSIS — I25.10 CORONARY ARTERY DISEASE INVOLVING NATIVE CORONARY ARTERY OF NATIVE HEART WITHOUT ANGINA PECTORIS: Chronic | ICD-10-CM

## 2024-03-06 DIAGNOSIS — I70.0 AORTIC ATHEROSCLEROSIS: ICD-10-CM

## 2024-03-06 DIAGNOSIS — I10 HTN (HYPERTENSION), BENIGN: ICD-10-CM

## 2024-03-06 DIAGNOSIS — J96.11 CHRONIC RESPIRATORY FAILURE WITH HYPOXIA: ICD-10-CM

## 2024-03-06 LAB
25(OH)D3+25(OH)D2 SERPL-MCNC: 16 NG/ML (ref 30–96)
ALBUMIN SERPL BCP-MCNC: 3.8 G/DL (ref 3.5–5.2)
ALP SERPL-CCNC: 124 U/L (ref 55–135)
ALT SERPL W/O P-5'-P-CCNC: 27 U/L (ref 10–44)
ANION GAP SERPL CALC-SCNC: 12 MMOL/L (ref 8–16)
AST SERPL-CCNC: 20 U/L (ref 10–40)
BASOPHILS # BLD AUTO: 0.07 K/UL (ref 0–0.2)
BASOPHILS NFR BLD: 0.7 % (ref 0–1.9)
BILIRUB SERPL-MCNC: 0.9 MG/DL (ref 0.1–1)
BNP SERPL-MCNC: 16 PG/ML (ref 0–99)
BUN SERPL-MCNC: 21 MG/DL (ref 8–23)
CALCIUM SERPL-MCNC: 10.1 MG/DL (ref 8.7–10.5)
CCP AB SER IA-ACNC: 0.5 U/ML
CHLORIDE SERPL-SCNC: 107 MMOL/L (ref 95–110)
CHOLEST SERPL-MCNC: 98 MG/DL (ref 120–199)
CHOLEST/HDLC SERPL: 2.6 {RATIO} (ref 2–5)
CO2 SERPL-SCNC: 27 MMOL/L (ref 23–29)
CREAT SERPL-MCNC: 1.1 MG/DL (ref 0.5–1.4)
CRP SERPL-MCNC: 7.8 MG/L (ref 0–8.2)
DIFFERENTIAL METHOD BLD: ABNORMAL
EOSINOPHIL # BLD AUTO: 0.3 K/UL (ref 0–0.5)
EOSINOPHIL NFR BLD: 3.2 % (ref 0–8)
ERYTHROCYTE [DISTWIDTH] IN BLOOD BY AUTOMATED COUNT: 15.4 % (ref 11.5–14.5)
ERYTHROCYTE [SEDIMENTATION RATE] IN BLOOD BY PHOTOMETRIC METHOD: 20 MM/HR (ref 0–23)
EST. GFR  (NO RACE VARIABLE): >60 ML/MIN/1.73 M^2
ESTIMATED AVG GLUCOSE: 117 MG/DL (ref 68–131)
GLUCOSE SERPL-MCNC: 116 MG/DL (ref 70–110)
HBA1C MFR BLD: 5.7 % (ref 4–5.6)
HCT VFR BLD AUTO: 45.4 % (ref 40–54)
HDLC SERPL-MCNC: 37 MG/DL (ref 40–75)
HDLC SERPL: 37.8 % (ref 20–50)
HGB BLD-MCNC: 14.2 G/DL (ref 14–18)
IMM GRANULOCYTES # BLD AUTO: 0.06 K/UL (ref 0–0.04)
IMM GRANULOCYTES NFR BLD AUTO: 0.6 % (ref 0–0.5)
LDLC SERPL CALC-MCNC: 50 MG/DL (ref 63–159)
LYMPHOCYTES # BLD AUTO: 1.8 K/UL (ref 1–4.8)
LYMPHOCYTES NFR BLD: 17.3 % (ref 18–48)
MCH RBC QN AUTO: 26 PG (ref 27–31)
MCHC RBC AUTO-ENTMCNC: 31.3 G/DL (ref 32–36)
MCV RBC AUTO: 83 FL (ref 82–98)
MONOCYTES # BLD AUTO: 0.8 K/UL (ref 0.3–1)
MONOCYTES NFR BLD: 7.9 % (ref 4–15)
NEUTROPHILS # BLD AUTO: 7.1 K/UL (ref 1.8–7.7)
NEUTROPHILS NFR BLD: 70.3 % (ref 38–73)
NONHDLC SERPL-MCNC: 61 MG/DL
NRBC BLD-RTO: 0 /100 WBC
PLATELET # BLD AUTO: 197 K/UL (ref 150–450)
PMV BLD AUTO: 11.3 FL (ref 9.2–12.9)
POTASSIUM SERPL-SCNC: 4.4 MMOL/L (ref 3.5–5.1)
PROT SERPL-MCNC: 7.5 G/DL (ref 6–8.4)
RBC # BLD AUTO: 5.46 M/UL (ref 4.6–6.2)
RHEUMATOID FACT SERPL-ACNC: <13 IU/ML (ref 0–15)
SODIUM SERPL-SCNC: 146 MMOL/L (ref 136–145)
T4 SERPL-MCNC: 9.1 UG/DL (ref 4.5–11.5)
TRIGL SERPL-MCNC: 55 MG/DL (ref 30–150)
TROPONIN I SERPL DL<=0.01 NG/ML-MCNC: <0.006 NG/ML (ref 0–0.03)
TSH SERPL DL<=0.005 MIU/L-ACNC: 0.64 UIU/ML (ref 0.4–4)
WBC # BLD AUTO: 10.13 K/UL (ref 3.9–12.7)

## 2024-03-06 PROCEDURE — 86140 C-REACTIVE PROTEIN: CPT | Performed by: STUDENT IN AN ORGANIZED HEALTH CARE EDUCATION/TRAINING PROGRAM

## 2024-03-06 PROCEDURE — 99214 OFFICE O/P EST MOD 30 MIN: CPT | Mod: S$PBB,,, | Performed by: STUDENT IN AN ORGANIZED HEALTH CARE EDUCATION/TRAINING PROGRAM

## 2024-03-06 PROCEDURE — 85025 COMPLETE CBC W/AUTO DIFF WBC: CPT | Performed by: STUDENT IN AN ORGANIZED HEALTH CARE EDUCATION/TRAINING PROGRAM

## 2024-03-06 PROCEDURE — 86431 RHEUMATOID FACTOR QUANT: CPT | Performed by: STUDENT IN AN ORGANIZED HEALTH CARE EDUCATION/TRAINING PROGRAM

## 2024-03-06 PROCEDURE — 99215 OFFICE O/P EST HI 40 MIN: CPT | Mod: PBBFAC,PO | Performed by: STUDENT IN AN ORGANIZED HEALTH CARE EDUCATION/TRAINING PROGRAM

## 2024-03-06 PROCEDURE — 84436 ASSAY OF TOTAL THYROXINE: CPT | Performed by: STUDENT IN AN ORGANIZED HEALTH CARE EDUCATION/TRAINING PROGRAM

## 2024-03-06 PROCEDURE — 85652 RBC SED RATE AUTOMATED: CPT | Performed by: STUDENT IN AN ORGANIZED HEALTH CARE EDUCATION/TRAINING PROGRAM

## 2024-03-06 PROCEDURE — 36415 COLL VENOUS BLD VENIPUNCTURE: CPT | Mod: PO | Performed by: STUDENT IN AN ORGANIZED HEALTH CARE EDUCATION/TRAINING PROGRAM

## 2024-03-06 PROCEDURE — 80061 LIPID PANEL: CPT | Performed by: STUDENT IN AN ORGANIZED HEALTH CARE EDUCATION/TRAINING PROGRAM

## 2024-03-06 PROCEDURE — 99999 PR PBB SHADOW E&M-EST. PATIENT-LVL V: CPT | Mod: PBBFAC,,, | Performed by: STUDENT IN AN ORGANIZED HEALTH CARE EDUCATION/TRAINING PROGRAM

## 2024-03-06 PROCEDURE — 80053 COMPREHEN METABOLIC PANEL: CPT | Performed by: STUDENT IN AN ORGANIZED HEALTH CARE EDUCATION/TRAINING PROGRAM

## 2024-03-06 PROCEDURE — 83036 HEMOGLOBIN GLYCOSYLATED A1C: CPT | Performed by: STUDENT IN AN ORGANIZED HEALTH CARE EDUCATION/TRAINING PROGRAM

## 2024-03-06 PROCEDURE — 82306 VITAMIN D 25 HYDROXY: CPT | Performed by: STUDENT IN AN ORGANIZED HEALTH CARE EDUCATION/TRAINING PROGRAM

## 2024-03-06 PROCEDURE — 86200 CCP ANTIBODY: CPT | Performed by: STUDENT IN AN ORGANIZED HEALTH CARE EDUCATION/TRAINING PROGRAM

## 2024-03-06 PROCEDURE — 84484 ASSAY OF TROPONIN QUANT: CPT | Performed by: STUDENT IN AN ORGANIZED HEALTH CARE EDUCATION/TRAINING PROGRAM

## 2024-03-06 PROCEDURE — 83880 ASSAY OF NATRIURETIC PEPTIDE: CPT | Performed by: STUDENT IN AN ORGANIZED HEALTH CARE EDUCATION/TRAINING PROGRAM

## 2024-03-06 PROCEDURE — 84443 ASSAY THYROID STIM HORMONE: CPT | Performed by: STUDENT IN AN ORGANIZED HEALTH CARE EDUCATION/TRAINING PROGRAM

## 2024-03-06 NOTE — PATIENT INSTRUCTIONS
1) no changes made to medication regimen  2) proceed with evaluations via both pulmonology and Cardiology  3) if shortness of breath with exertion continues, I will facilitate formal physical therapy to improve conditioning/exercise tolerance  4) low-dose CT chest ordered to be gathered in approximately three-months

## 2024-03-07 ENCOUNTER — PATIENT MESSAGE (OUTPATIENT)
Dept: INTERNAL MEDICINE | Facility: CLINIC | Age: 79
End: 2024-03-07
Payer: MEDICARE

## 2024-03-07 RX ORDER — AMITRIPTYLINE HYDROCHLORIDE 25 MG/1
25 TABLET, FILM COATED ORAL DAILY
Qty: 90 TABLET | Refills: 1 | Status: SHIPPED | OUTPATIENT
Start: 2024-03-07

## 2024-03-07 NOTE — TELEPHONE ENCOUNTER
No care due was identified.  [facility] Embedded Care Due Messages. Reference number: [id number].   3/07/2024 10:11:15 AM CST

## 2024-03-15 ENCOUNTER — PATIENT MESSAGE (OUTPATIENT)
Dept: PULMONOLOGY | Facility: CLINIC | Age: 79
End: 2024-03-15
Payer: MEDICARE

## 2024-03-15 ENCOUNTER — HOSPITAL ENCOUNTER (OUTPATIENT)
Dept: PULMONOLOGY | Facility: CLINIC | Age: 79
Discharge: HOME | End: 2024-03-15
Payer: MEDICARE

## 2024-03-15 VITALS — WEIGHT: 227.06 LBS | HEIGHT: 68 IN | BODY MASS INDEX: 34.41 KG/M2

## 2024-03-15 DIAGNOSIS — J44.1 CHRONIC OBSTRUCTIVE PULMONARY DISEASE WITH ACUTE EXACERBATION: ICD-10-CM

## 2024-03-15 LAB
DLCO ADJ PRE: 8.87 ML/(MIN*MMHG) (ref 17.39–31.24)
DLCO SINGLE BREATH LLN: 17.39
DLCO SINGLE BREATH PRE REF: 36 %
DLCO SINGLE BREATH REF: 24.32
DLCOC SBVA LLN: 2.41
DLCOC SBVA PRE REF: 79.5 %
DLCOC SBVA REF: 3.62
DLCOC SINGLE BREATH LLN: 17.39
DLCOC SINGLE BREATH PRE REF: 36.5 %
DLCOC SINGLE BREATH REF: 24.32
DLCOCSBVAULN: 4.82
DLCOCSINGLEBREATHULN: 31.24
DLCOSINGLEBREATHULN: 31.24
DLCOVA LLN: 2.41
DLCOVA PRE REF: 78.6 %
DLCOVA PRE: 2.84 ML/(MIN*MMHG*L) (ref 2.41–4.82)
DLCOVA REF: 3.62
DLCOVAULN: 4.82
DLVAADJ PRE: 2.88 ML/(MIN*MMHG*L) (ref 2.41–4.82)
ERV LLN: -16449.07
ERV PRE REF: 74.6 %
ERV REF: 0.93
ERVULN: ABNORMAL
FEF 25 75 LLN: 0.82
FEF 25 75 PRE REF: 27.7 %
FEF 25 75 REF: 2.06
FET100 CHG: -2.5 %
FEV05 LLN: 1.22
FEV05 REF: 2.35
FEV1 CHG: 5.8 %
FEV1 FVC LLN: 61
FEV1 FVC PRE REF: 81.4 %
FEV1 FVC REF: 75
FEV1 LLN: 1.97
FEV1 PRE REF: 38.6 %
FEV1 REF: 2.8
FEV1 VOL CHG: 0.06
FRCPLETH LLN: 2.65
FRCPLETH PREREF: 175.2 %
FRCPLETH REF: 3.64
FRCPLETHULN: 4.62
FVC CHG: 21.1 %
FVC LLN: 2.72
FVC PRE REF: 47.1 %
FVC REF: 3.74
FVC VOL CHG: 0.37
IVC PRE: 1.87 L (ref 2.72–4.77)
IVC SINGLE BREATH LLN: 2.72
IVC SINGLE BREATH PRE REF: 50 %
IVC SINGLE BREATH REF: 3.74
IVCSINGLEBREATHULN: 4.77
LLN IC: -9999997.17
PEF LLN: 5.09
PEF PRE REF: 60.1 %
PEF REF: 7.27
PHYSICIAN COMMENT: ABNORMAL
POST FEF 25 75: 0.5 L/S (ref 0.82–3.86)
POST FET 100: 5.59 SEC
POST FEV1 FVC: 53.65 % (ref 61.08–88.32)
POST FEV1: 1.14 L (ref 1.97–3.58)
POST FEV5: 0.81 L (ref 1.22–3.49)
POST FVC: 2.13 L (ref 2.72–4.77)
POST PEF: 4.22 L/S (ref 5.09–9.46)
PRE DLCO: 8.76 ML/(MIN*MMHG) (ref 17.39–31.24)
PRE ERV: 0.69 L (ref -16449.07–16450.93)
PRE FEF 25 75: 0.57 L/S (ref 0.82–3.86)
PRE FET 100: 5.73 SEC
PRE FEV05 REF: 35.1 %
PRE FEV1 FVC: 61.44 % (ref 61.08–88.32)
PRE FEV1: 1.08 L (ref 1.97–3.58)
PRE FEV5: 0.83 L (ref 1.22–3.49)
PRE FRC PL: 6.37 L (ref 2.65–4.62)
PRE FVC: 1.76 L (ref 2.72–4.77)
PRE IC: 1.5 L (ref -9999997.17–#######.####)
PRE PEF: 4.37 L/S (ref 5.09–9.46)
PRE REF IC: 53 %
PRE RV: 5.68 L (ref 2.03–3.38)
PRE TLC: 7.87 L (ref 5.57–7.87)
RAW PRE REF: 165.3 %
RAW PRE: 5.06 CMH2O*S/L (ref 3.06–3.06)
RAW REF: 3.06
REF IC: 2.83
RV LLN: 2.03
RV PRE REF: 209.9 %
RV REF: 2.7
RVTLC LLN: 35
RVTLC PRE REF: 165.5 %
RVTLC PRE: 72.14 % (ref 34.62–52.58)
RVTLC REF: 44
RVTLCULN: 53
RVULN: 3.38
SGAW PRE REF: 34.6 %
SGAW PRE: 0.03 1/(CMH2O*S) (ref 0.08–0.08)
SGAW REF: 0.08
TLC LLN: 5.57
TLC PRE REF: 117.1 %
TLC REF: 6.72
TLC ULN: 7.87
ULN IC: ABNORMAL
VA PRE: 3.08 L (ref 6.57–6.57)
VA SINGLE BREATH LLN: 6.57
VA SINGLE BREATH PRE REF: 46.9 %
VA SINGLE BREATH REF: 6.57
VASINGLEBREATHULN: 6.57
VC LLN: 2.72
VC PRE REF: 58.6 %
VC PRE: 2.19 L (ref 2.72–4.77)
VC REF: 3.74
VC ULN: 4.77

## 2024-03-15 PROCEDURE — 94618 PULMONARY STRESS TESTING: CPT | Mod: 26,S$PBB,, | Performed by: INTERNAL MEDICINE

## 2024-03-15 PROCEDURE — 94060 EVALUATION OF WHEEZING: CPT | Mod: 26,S$PBB,59, | Performed by: INTERNAL MEDICINE

## 2024-03-15 PROCEDURE — 94060 EVALUATION OF WHEEZING: CPT | Mod: PBBFAC | Performed by: INTERNAL MEDICINE

## 2024-03-15 PROCEDURE — 94729 DIFFUSING CAPACITY: CPT | Mod: 26,S$PBB,, | Performed by: INTERNAL MEDICINE

## 2024-03-15 PROCEDURE — 94727 GAS DIL/WSHOT DETER LNG VOL: CPT | Mod: 26,S$PBB,, | Performed by: INTERNAL MEDICINE

## 2024-03-15 PROCEDURE — 94729 DIFFUSING CAPACITY: CPT | Mod: PBBFAC | Performed by: INTERNAL MEDICINE

## 2024-03-15 PROCEDURE — 94618 PULMONARY STRESS TESTING: CPT | Mod: PBBFAC | Performed by: INTERNAL MEDICINE

## 2024-03-15 PROCEDURE — 94727 GAS DIL/WSHOT DETER LNG VOL: CPT | Mod: PBBFAC | Performed by: INTERNAL MEDICINE

## 2024-03-15 NOTE — PROCEDURES
Jordin Allen Jr. is a 78 y.o.  male patient, who presents for a 6 minute walk test ordered by MD Ed.  The diagnosis is Shortness of Breath; COPD.  The patient's BMI is 34.5 kg/m2.  Predicted distance (lower limit of normal) is 266.02 meters.      Test Results:    The test was completed without stopping. The total time walked was 360 seconds. During walking, the patient reported:  Dyspnea. The patient used no assistive devices during testing.     03/15/2024---------Distance: 335.28 meters (1100 feet)     O2 Sat % Supplemental Oxygen Heart Rate Blood Pressure Minesh Scale   Pre-exercise  (Resting) 97 % Room Air 94 bpm 139/91 mmHg 1   During Exercise 91 % Room Air 119 bpm 174/88 mmHg 3   Post-exercise  (Recovery) 95 % Room Air  104 bpm       Recovery Time: 60 seconds    Performing nurse/tech: Ester SALEH      PREVIOUS STUDY:   The patient has not had a previous study.      CLINICAL INTERPRETATION:  Six minute walk distance is 335.28 meters (1100 feet) with moderate dyspnea.  During exercise, there was significant desaturation while breathing room air.  Both blood pressure and heart rate increased significantly with walking.  Hypertension was present prior to exercise.  The patient did not report non-pulmonary symptoms during exercise.  No previous study performed.  Based upon age and body mass index, exercise capacity is normal.

## 2024-03-18 ENCOUNTER — OFFICE VISIT (OUTPATIENT)
Dept: CARDIOLOGY | Facility: CLINIC | Age: 79
DRG: 181 | End: 2024-03-18
Payer: MEDICARE

## 2024-03-18 VITALS
BODY MASS INDEX: 34.57 KG/M2 | WEIGHT: 228.06 LBS | HEART RATE: 80 BPM | HEIGHT: 68 IN | SYSTOLIC BLOOD PRESSURE: 138 MMHG | DIASTOLIC BLOOD PRESSURE: 80 MMHG

## 2024-03-18 DIAGNOSIS — I10 HTN (HYPERTENSION), BENIGN: ICD-10-CM

## 2024-03-18 DIAGNOSIS — E66.09 CLASS 1 OBESITY DUE TO EXCESS CALORIES WITH SERIOUS COMORBIDITY AND BODY MASS INDEX (BMI) OF 34.0 TO 34.9 IN ADULT: ICD-10-CM

## 2024-03-18 DIAGNOSIS — Z87.891 HISTORY OF SMOKING 30 OR MORE PACK YEARS: ICD-10-CM

## 2024-03-18 DIAGNOSIS — E78.5 DYSLIPIDEMIA: ICD-10-CM

## 2024-03-18 DIAGNOSIS — R06.09 DOE (DYSPNEA ON EXERTION): ICD-10-CM

## 2024-03-18 DIAGNOSIS — J44.9 CHRONIC OBSTRUCTIVE PULMONARY DISEASE, UNSPECIFIED COPD TYPE: ICD-10-CM

## 2024-03-18 DIAGNOSIS — I70.0 AORTIC ATHEROSCLEROSIS: ICD-10-CM

## 2024-03-18 DIAGNOSIS — I25.10 CORONARY ARTERY DISEASE INVOLVING NATIVE CORONARY ARTERY OF NATIVE HEART WITHOUT ANGINA PECTORIS: Primary | Chronic | ICD-10-CM

## 2024-03-18 PROCEDURE — 99214 OFFICE O/P EST MOD 30 MIN: CPT | Mod: PBBFAC,PO,25 | Performed by: INTERNAL MEDICINE

## 2024-03-18 PROCEDURE — 99214 OFFICE O/P EST MOD 30 MIN: CPT | Mod: S$PBB,,, | Performed by: INTERNAL MEDICINE

## 2024-03-18 PROCEDURE — 93010 ELECTROCARDIOGRAM REPORT: CPT | Mod: S$PBB,,, | Performed by: INTERNAL MEDICINE

## 2024-03-18 PROCEDURE — 99999 PR PBB SHADOW E&M-EST. PATIENT-LVL IV: CPT | Mod: PBBFAC,,, | Performed by: INTERNAL MEDICINE

## 2024-03-18 PROCEDURE — 93005 ELECTROCARDIOGRAM TRACING: CPT | Mod: PBBFAC,PO | Performed by: INTERNAL MEDICINE

## 2024-03-18 RX ORDER — HYDROCHLOROTHIAZIDE 25 MG/1
12.5 TABLET ORAL DAILY
Qty: 30 TABLET | Refills: 6 | Status: SHIPPED | OUTPATIENT
Start: 2024-03-18 | End: 2024-03-18

## 2024-03-18 RX ORDER — ATORVASTATIN CALCIUM 80 MG/1
TABLET, FILM COATED ORAL
Qty: 90 TABLET | Refills: 3 | Status: SHIPPED | OUTPATIENT
Start: 2024-03-18

## 2024-03-18 RX ORDER — LOSARTAN POTASSIUM 100 MG/1
100 TABLET ORAL DAILY
Qty: 90 TABLET | Refills: 2 | Status: SHIPPED | OUTPATIENT
Start: 2024-03-18

## 2024-03-18 RX ORDER — AMLODIPINE BESYLATE 5 MG/1
5 TABLET ORAL DAILY
Qty: 90 TABLET | Refills: 3 | Status: SHIPPED | OUTPATIENT
Start: 2024-03-18 | End: 2025-03-18

## 2024-03-18 RX ORDER — HYDROCHLOROTHIAZIDE 25 MG/1
25 TABLET ORAL DAILY
Qty: 90 TABLET | Refills: 3 | Status: SHIPPED | OUTPATIENT
Start: 2024-03-18 | End: 2025-03-13
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
## 2024-03-19 ENCOUNTER — HOSPITAL ENCOUNTER (INPATIENT)
Facility: HOSPITAL | Age: 79
LOS: 2 days | Discharge: HOME | DRG: 181 | End: 2024-03-22
Attending: STUDENT IN AN ORGANIZED HEALTH CARE EDUCATION/TRAINING PROGRAM | Admitting: STUDENT IN AN ORGANIZED HEALTH CARE EDUCATION/TRAINING PROGRAM
Payer: MEDICARE

## 2024-03-19 DIAGNOSIS — I10 HTN (HYPERTENSION), BENIGN: ICD-10-CM

## 2024-03-19 DIAGNOSIS — Z87.891 HISTORY OF SMOKING 30 OR MORE PACK YEARS: ICD-10-CM

## 2024-03-19 DIAGNOSIS — R91.8 HILAR MASS: ICD-10-CM

## 2024-03-19 DIAGNOSIS — R04.2 COUGH WITH HEMOPTYSIS: Primary | ICD-10-CM

## 2024-03-19 DIAGNOSIS — G47.33 OSA (OBSTRUCTIVE SLEEP APNEA): ICD-10-CM

## 2024-03-19 DIAGNOSIS — J43.2 CENTRILOBULAR EMPHYSEMA: Chronic | ICD-10-CM

## 2024-03-19 DIAGNOSIS — J96.11 CHRONIC RESPIRATORY FAILURE WITH HYPOXIA: ICD-10-CM

## 2024-03-19 DIAGNOSIS — R04.2 HEMOPTYSIS: ICD-10-CM

## 2024-03-19 DIAGNOSIS — J06.9 UPPER RESPIRATORY TRACT INFECTION, UNSPECIFIED TYPE: ICD-10-CM

## 2024-03-19 LAB
ABO + RH BLD: NORMAL
ALBUMIN SERPL BCP-MCNC: 3.9 G/DL (ref 3.5–5.2)
ALP SERPL-CCNC: 134 U/L (ref 55–135)
ALT SERPL W/O P-5'-P-CCNC: 27 U/L (ref 10–44)
ANION GAP SERPL CALC-SCNC: 11 MMOL/L (ref 8–16)
APTT PPP: 27.7 SEC (ref 21–32)
AST SERPL-CCNC: 19 U/L (ref 10–40)
BASOPHILS # BLD AUTO: 0.07 K/UL (ref 0–0.2)
BASOPHILS NFR BLD: 0.6 % (ref 0–1.9)
BILIRUB SERPL-MCNC: 0.7 MG/DL (ref 0.1–1)
BLD GP AB SCN CELLS X3 SERPL QL: NORMAL
BUN SERPL-MCNC: 23 MG/DL (ref 8–23)
CALCIUM SERPL-MCNC: 10.3 MG/DL (ref 8.7–10.5)
CHLORIDE SERPL-SCNC: 106 MMOL/L (ref 95–110)
CO2 SERPL-SCNC: 25 MMOL/L (ref 23–29)
CREAT SERPL-MCNC: 0.9 MG/DL (ref 0.5–1.4)
DIFFERENTIAL METHOD BLD: ABNORMAL
EOSINOPHIL # BLD AUTO: 0.2 K/UL (ref 0–0.5)
EOSINOPHIL NFR BLD: 1.8 % (ref 0–8)
ERYTHROCYTE [DISTWIDTH] IN BLOOD BY AUTOMATED COUNT: 15 % (ref 11.5–14.5)
EST. GFR  (NO RACE VARIABLE): >60 ML/MIN/1.73 M^2
GLUCOSE SERPL-MCNC: 115 MG/DL (ref 70–110)
HCT VFR BLD AUTO: 46.6 % (ref 40–54)
HCV AB SERPL QL IA: NORMAL
HGB BLD-MCNC: 14.9 G/DL (ref 14–18)
HIV 1+2 AB+HIV1 P24 AG SERPL QL IA: NORMAL
IMM GRANULOCYTES # BLD AUTO: 0.07 K/UL (ref 0–0.04)
IMM GRANULOCYTES NFR BLD AUTO: 0.6 % (ref 0–0.5)
INFLUENZA A, MOLECULAR: NEGATIVE
INFLUENZA B, MOLECULAR: NEGATIVE
INR PPP: 1 (ref 0.8–1.2)
LYMPHOCYTES # BLD AUTO: 1.7 K/UL (ref 1–4.8)
LYMPHOCYTES NFR BLD: 14 % (ref 18–48)
MCH RBC QN AUTO: 25.7 PG (ref 27–31)
MCHC RBC AUTO-ENTMCNC: 32 G/DL (ref 32–36)
MCV RBC AUTO: 80 FL (ref 82–98)
MONOCYTES # BLD AUTO: 0.8 K/UL (ref 0.3–1)
MONOCYTES NFR BLD: 6.5 % (ref 4–15)
NEUTROPHILS # BLD AUTO: 9.1 K/UL (ref 1.8–7.7)
NEUTROPHILS NFR BLD: 76.5 % (ref 38–73)
NRBC BLD-RTO: 0 /100 WBC
OHS QRS DURATION: 88 MS
OHS QTC CALCULATION: 458 MS
PLATELET # BLD AUTO: 223 K/UL (ref 150–450)
PMV BLD AUTO: 10.6 FL (ref 9.2–12.9)
POTASSIUM SERPL-SCNC: 3.6 MMOL/L (ref 3.5–5.1)
PROT SERPL-MCNC: 8 G/DL (ref 6–8.4)
PROTHROMBIN TIME: 10.7 SEC (ref 9–12.5)
RBC # BLD AUTO: 5.8 M/UL (ref 4.6–6.2)
SARS-COV-2 RDRP RESP QL NAA+PROBE: NEGATIVE
SODIUM SERPL-SCNC: 142 MMOL/L (ref 136–145)
SPECIMEN OUTDATE: NORMAL
SPECIMEN SOURCE: NORMAL
WBC # BLD AUTO: 11.9 K/UL (ref 3.9–12.7)

## 2024-03-19 PROCEDURE — 25500020 PHARM REV CODE 255: Performed by: STUDENT IN AN ORGANIZED HEALTH CARE EDUCATION/TRAINING PROGRAM

## 2024-03-19 PROCEDURE — 93010 ELECTROCARDIOGRAM REPORT: CPT | Mod: ,,, | Performed by: INTERNAL MEDICINE

## 2024-03-19 PROCEDURE — 85025 COMPLETE CBC W/AUTO DIFF WBC: CPT | Performed by: STUDENT IN AN ORGANIZED HEALTH CARE EDUCATION/TRAINING PROGRAM

## 2024-03-19 PROCEDURE — U0002 COVID-19 LAB TEST NON-CDC: HCPCS | Performed by: STUDENT IN AN ORGANIZED HEALTH CARE EDUCATION/TRAINING PROGRAM

## 2024-03-19 PROCEDURE — 87502 INFLUENZA DNA AMP PROBE: CPT | Performed by: STUDENT IN AN ORGANIZED HEALTH CARE EDUCATION/TRAINING PROGRAM

## 2024-03-19 PROCEDURE — 86901 BLOOD TYPING SEROLOGIC RH(D): CPT | Performed by: STUDENT IN AN ORGANIZED HEALTH CARE EDUCATION/TRAINING PROGRAM

## 2024-03-19 PROCEDURE — 99285 EMERGENCY DEPT VISIT HI MDM: CPT | Mod: 25

## 2024-03-19 PROCEDURE — 85610 PROTHROMBIN TIME: CPT | Performed by: STUDENT IN AN ORGANIZED HEALTH CARE EDUCATION/TRAINING PROGRAM

## 2024-03-19 PROCEDURE — 85730 THROMBOPLASTIN TIME PARTIAL: CPT | Performed by: STUDENT IN AN ORGANIZED HEALTH CARE EDUCATION/TRAINING PROGRAM

## 2024-03-19 PROCEDURE — 87389 HIV-1 AG W/HIV-1&-2 AB AG IA: CPT | Performed by: STUDENT IN AN ORGANIZED HEALTH CARE EDUCATION/TRAINING PROGRAM

## 2024-03-19 PROCEDURE — 93005 ELECTROCARDIOGRAM TRACING: CPT

## 2024-03-19 PROCEDURE — 80053 COMPREHEN METABOLIC PANEL: CPT | Performed by: STUDENT IN AN ORGANIZED HEALTH CARE EDUCATION/TRAINING PROGRAM

## 2024-03-19 PROCEDURE — 86803 HEPATITIS C AB TEST: CPT | Performed by: STUDENT IN AN ORGANIZED HEALTH CARE EDUCATION/TRAINING PROGRAM

## 2024-03-19 RX ADMIN — IOHEXOL 100 ML: 350 INJECTION, SOLUTION INTRAVENOUS at 09:03

## 2024-03-20 PROBLEM — R04.2 HEMOPTYSIS: Status: ACTIVE | Noted: 2024-03-20

## 2024-03-20 PROBLEM — R91.8 HILAR MASS: Status: ACTIVE | Noted: 2024-03-20

## 2024-03-20 LAB
ALBUMIN SERPL BCP-MCNC: 3.6 G/DL (ref 3.5–5.2)
ALP SERPL-CCNC: 117 U/L (ref 55–135)
ALT SERPL W/O P-5'-P-CCNC: 25 U/L (ref 10–44)
ANION GAP SERPL CALC-SCNC: 11 MMOL/L (ref 8–16)
APTT PPP: 27.6 SEC (ref 21–32)
AST SERPL-CCNC: 17 U/L (ref 10–40)
BASOPHILS # BLD AUTO: 0.06 K/UL (ref 0–0.2)
BASOPHILS NFR BLD: 0.5 % (ref 0–1.9)
BILIRUB SERPL-MCNC: 0.7 MG/DL (ref 0.1–1)
BNP SERPL-MCNC: 14 PG/ML (ref 0–99)
BUN SERPL-MCNC: 19 MG/DL (ref 8–23)
CALCIUM SERPL-MCNC: 9.4 MG/DL (ref 8.7–10.5)
CHLORIDE SERPL-SCNC: 107 MMOL/L (ref 95–110)
CO2 SERPL-SCNC: 23 MMOL/L (ref 23–29)
CREAT SERPL-MCNC: 0.8 MG/DL (ref 0.5–1.4)
DIFFERENTIAL METHOD BLD: ABNORMAL
EOSINOPHIL # BLD AUTO: 0.3 K/UL (ref 0–0.5)
EOSINOPHIL NFR BLD: 2.7 % (ref 0–8)
ERYTHROCYTE [DISTWIDTH] IN BLOOD BY AUTOMATED COUNT: 14.8 % (ref 11.5–14.5)
EST. GFR  (NO RACE VARIABLE): >60 ML/MIN/1.73 M^2
GLUCOSE SERPL-MCNC: 118 MG/DL (ref 70–110)
GLUCOSE SERPL-MCNC: 128 MG/DL (ref 70–110)
HCT VFR BLD AUTO: 43.2 % (ref 40–54)
HGB BLD-MCNC: 14.1 G/DL (ref 14–18)
IMM GRANULOCYTES # BLD AUTO: 0.06 K/UL (ref 0–0.04)
IMM GRANULOCYTES NFR BLD AUTO: 0.5 % (ref 0–0.5)
INR PPP: 1 (ref 0.8–1.2)
LYMPHOCYTES # BLD AUTO: 2.2 K/UL (ref 1–4.8)
LYMPHOCYTES NFR BLD: 18.5 % (ref 18–48)
MAGNESIUM SERPL-MCNC: 1.8 MG/DL (ref 1.6–2.6)
MCH RBC QN AUTO: 25.9 PG (ref 27–31)
MCHC RBC AUTO-ENTMCNC: 32.6 G/DL (ref 32–36)
MCV RBC AUTO: 79 FL (ref 82–98)
MONOCYTES # BLD AUTO: 0.9 K/UL (ref 0.3–1)
MONOCYTES NFR BLD: 7.5 % (ref 4–15)
NEUTROPHILS # BLD AUTO: 8.5 K/UL (ref 1.8–7.7)
NEUTROPHILS NFR BLD: 70.3 % (ref 38–73)
NRBC BLD-RTO: 0 /100 WBC
OHS QRS DURATION: 86 MS
OHS QTC CALCULATION: 442 MS
PHOSPHATE SERPL-MCNC: 2.3 MG/DL (ref 2.7–4.5)
PLATELET # BLD AUTO: 203 K/UL (ref 150–450)
PMV BLD AUTO: 10.5 FL (ref 9.2–12.9)
POCT GLUCOSE: 128 MG/DL (ref 70–110)
POTASSIUM SERPL-SCNC: 3.3 MMOL/L (ref 3.5–5.1)
PROT SERPL-MCNC: 7.2 G/DL (ref 6–8.4)
PROTHROMBIN TIME: 10.9 SEC (ref 9–12.5)
RBC # BLD AUTO: 5.44 M/UL (ref 4.6–6.2)
SODIUM SERPL-SCNC: 141 MMOL/L (ref 136–145)
TROPONIN I SERPL DL<=0.01 NG/ML-MCNC: 0.01 NG/ML (ref 0–0.03)
WBC # BLD AUTO: 12.02 K/UL (ref 3.9–12.7)

## 2024-03-20 PROCEDURE — 63600175 PHARM REV CODE 636 W HCPCS: Performed by: STUDENT IN AN ORGANIZED HEALTH CARE EDUCATION/TRAINING PROGRAM

## 2024-03-20 PROCEDURE — 27100171 HC OXYGEN HIGH FLOW UP TO 24 HOURS

## 2024-03-20 PROCEDURE — 94761 N-INVAS EAR/PLS OXIMETRY MLT: CPT

## 2024-03-20 PROCEDURE — 85730 THROMBOPLASTIN TIME PARTIAL: CPT | Performed by: STUDENT IN AN ORGANIZED HEALTH CARE EDUCATION/TRAINING PROGRAM

## 2024-03-20 PROCEDURE — 25000003 PHARM REV CODE 250: Performed by: STUDENT IN AN ORGANIZED HEALTH CARE EDUCATION/TRAINING PROGRAM

## 2024-03-20 PROCEDURE — 80053 COMPREHEN METABOLIC PANEL: CPT | Performed by: STUDENT IN AN ORGANIZED HEALTH CARE EDUCATION/TRAINING PROGRAM

## 2024-03-20 PROCEDURE — 83880 ASSAY OF NATRIURETIC PEPTIDE: CPT | Performed by: STUDENT IN AN ORGANIZED HEALTH CARE EDUCATION/TRAINING PROGRAM

## 2024-03-20 PROCEDURE — 94640 AIRWAY INHALATION TREATMENT: CPT

## 2024-03-20 PROCEDURE — 96365 THER/PROPH/DIAG IV INF INIT: CPT

## 2024-03-20 PROCEDURE — 84484 ASSAY OF TROPONIN QUANT: CPT | Performed by: STUDENT IN AN ORGANIZED HEALTH CARE EDUCATION/TRAINING PROGRAM

## 2024-03-20 PROCEDURE — 84100 ASSAY OF PHOSPHORUS: CPT | Performed by: STUDENT IN AN ORGANIZED HEALTH CARE EDUCATION/TRAINING PROGRAM

## 2024-03-20 PROCEDURE — 99900035 HC TECH TIME PER 15 MIN (STAT)

## 2024-03-20 PROCEDURE — 96366 THER/PROPH/DIAG IV INF ADDON: CPT

## 2024-03-20 PROCEDURE — 21400001 HC TELEMETRY ROOM

## 2024-03-20 PROCEDURE — 25000242 PHARM REV CODE 250 ALT 637 W/ HCPCS: Performed by: INTERNAL MEDICINE

## 2024-03-20 PROCEDURE — 85025 COMPLETE CBC W/AUTO DIFF WBC: CPT | Performed by: STUDENT IN AN ORGANIZED HEALTH CARE EDUCATION/TRAINING PROGRAM

## 2024-03-20 PROCEDURE — 27000190 HC CPAP FULL FACE MASK W/VALVE

## 2024-03-20 PROCEDURE — 94660 CPAP INITIATION&MGMT: CPT

## 2024-03-20 PROCEDURE — 99223 1ST HOSP IP/OBS HIGH 75: CPT | Mod: ,,, | Performed by: INTERNAL MEDICINE

## 2024-03-20 PROCEDURE — 82962 GLUCOSE BLOOD TEST: CPT

## 2024-03-20 PROCEDURE — 83735 ASSAY OF MAGNESIUM: CPT | Performed by: STUDENT IN AN ORGANIZED HEALTH CARE EDUCATION/TRAINING PROGRAM

## 2024-03-20 PROCEDURE — 85610 PROTHROMBIN TIME: CPT | Performed by: STUDENT IN AN ORGANIZED HEALTH CARE EDUCATION/TRAINING PROGRAM

## 2024-03-20 PROCEDURE — 25000242 PHARM REV CODE 250 ALT 637 W/ HCPCS: Performed by: STUDENT IN AN ORGANIZED HEALTH CARE EDUCATION/TRAINING PROGRAM

## 2024-03-20 RX ORDER — GLUCAGON 1 MG
1 KIT INJECTION
Status: DISCONTINUED | OUTPATIENT
Start: 2024-03-20 | End: 2024-03-22 | Stop reason: HOSPADM

## 2024-03-20 RX ORDER — AMITRIPTYLINE HYDROCHLORIDE 25 MG/1
25 TABLET, FILM COATED ORAL DAILY
Status: DISCONTINUED | OUTPATIENT
Start: 2024-03-20 | End: 2024-03-22 | Stop reason: HOSPADM

## 2024-03-20 RX ORDER — IPRATROPIUM BROMIDE 0.5 MG/2.5ML
0.5 SOLUTION RESPIRATORY (INHALATION) EVERY 6 HOURS PRN
Status: DISCONTINUED | OUTPATIENT
Start: 2024-03-20 | End: 2024-03-22 | Stop reason: HOSPADM

## 2024-03-20 RX ORDER — IBUPROFEN 200 MG
16 TABLET ORAL
Status: DISCONTINUED | OUTPATIENT
Start: 2024-03-20 | End: 2024-03-22 | Stop reason: HOSPADM

## 2024-03-20 RX ORDER — LOSARTAN POTASSIUM 50 MG/1
100 TABLET ORAL DAILY
Status: DISCONTINUED | OUTPATIENT
Start: 2024-03-20 | End: 2024-03-22 | Stop reason: HOSPADM

## 2024-03-20 RX ORDER — INSULIN ASPART 100 [IU]/ML
0-5 INJECTION, SOLUTION INTRAVENOUS; SUBCUTANEOUS
Status: DISCONTINUED | OUTPATIENT
Start: 2024-03-20 | End: 2024-03-22 | Stop reason: HOSPADM

## 2024-03-20 RX ORDER — ACETAMINOPHEN 325 MG/1
650 TABLET ORAL EVERY 4 HOURS PRN
Status: DISCONTINUED | OUTPATIENT
Start: 2024-03-20 | End: 2024-03-22 | Stop reason: HOSPADM

## 2024-03-20 RX ORDER — SODIUM CHLORIDE 0.9 % (FLUSH) 0.9 %
10 SYRINGE (ML) INJECTION
Status: DISCONTINUED | OUTPATIENT
Start: 2024-03-20 | End: 2024-03-22 | Stop reason: HOSPADM

## 2024-03-20 RX ORDER — AMLODIPINE BESYLATE 5 MG/1
5 TABLET ORAL DAILY
Status: DISCONTINUED | OUTPATIENT
Start: 2024-03-20 | End: 2024-03-22 | Stop reason: HOSPADM

## 2024-03-20 RX ORDER — IBUPROFEN 200 MG
24 TABLET ORAL
Status: DISCONTINUED | OUTPATIENT
Start: 2024-03-20 | End: 2024-03-22 | Stop reason: HOSPADM

## 2024-03-20 RX ORDER — FLUTICASONE PROPIONATE 50 MCG
1 SPRAY, SUSPENSION (ML) NASAL DAILY
Status: DISCONTINUED | OUTPATIENT
Start: 2024-03-20 | End: 2024-03-22 | Stop reason: HOSPADM

## 2024-03-20 RX ORDER — ASPIRIN 81 MG/1
81 TABLET ORAL DAILY
Status: DISCONTINUED | OUTPATIENT
Start: 2024-03-21 | End: 2024-03-20

## 2024-03-20 RX ORDER — HYDROCHLOROTHIAZIDE 12.5 MG/1
25 TABLET ORAL DAILY
Status: DISCONTINUED | OUTPATIENT
Start: 2024-03-20 | End: 2024-03-22 | Stop reason: HOSPADM

## 2024-03-20 RX ORDER — POTASSIUM CHLORIDE 7.45 MG/ML
10 INJECTION INTRAVENOUS ONCE
Status: COMPLETED | OUTPATIENT
Start: 2024-03-20 | End: 2024-03-20

## 2024-03-20 RX ORDER — ATORVASTATIN CALCIUM 40 MG/1
80 TABLET, FILM COATED ORAL DAILY
Status: DISCONTINUED | OUTPATIENT
Start: 2024-03-20 | End: 2024-03-22 | Stop reason: HOSPADM

## 2024-03-20 RX ORDER — FLUTICASONE FUROATE AND VILANTEROL 200; 25 UG/1; UG/1
1 POWDER RESPIRATORY (INHALATION) DAILY
Status: DISCONTINUED | OUTPATIENT
Start: 2024-03-20 | End: 2024-03-22 | Stop reason: HOSPADM

## 2024-03-20 RX ORDER — PANTOPRAZOLE SODIUM 40 MG/1
40 TABLET, DELAYED RELEASE ORAL DAILY
Status: DISCONTINUED | OUTPATIENT
Start: 2024-03-20 | End: 2024-03-22 | Stop reason: HOSPADM

## 2024-03-20 RX ORDER — ROFLUMILAST 500 UG/1
1 TABLET ORAL DAILY
Status: DISCONTINUED | OUTPATIENT
Start: 2024-03-20 | End: 2024-03-22 | Stop reason: HOSPADM

## 2024-03-20 RX ORDER — ALBUTEROL SULFATE 90 UG/1
2 AEROSOL, METERED RESPIRATORY (INHALATION) EVERY 4 HOURS PRN
Status: DISCONTINUED | OUTPATIENT
Start: 2024-03-20 | End: 2024-03-22 | Stop reason: HOSPADM

## 2024-03-20 RX ORDER — ASPIRIN 81 MG/1
81 TABLET ORAL DAILY
Status: DISCONTINUED | OUTPATIENT
Start: 2024-03-20 | End: 2024-03-20

## 2024-03-20 RX ORDER — IPRATROPIUM BROMIDE AND ALBUTEROL SULFATE 2.5; .5 MG/3ML; MG/3ML
3 SOLUTION RESPIRATORY (INHALATION) EVERY 6 HOURS
Status: DISCONTINUED | OUTPATIENT
Start: 2024-03-20 | End: 2024-03-22 | Stop reason: HOSPADM

## 2024-03-20 RX ADMIN — AMLODIPINE BESYLATE 5 MG: 5 TABLET ORAL at 09:03

## 2024-03-20 RX ADMIN — PANTOPRAZOLE SODIUM 40 MG: 40 TABLET, DELAYED RELEASE ORAL at 09:03

## 2024-03-20 RX ADMIN — FLUTICASONE FUROATE AND VILANTEROL TRIFENATATE 1 PUFF: 200; 25 POWDER RESPIRATORY (INHALATION) at 11:03

## 2024-03-20 RX ADMIN — FLUTICASONE PROPIONATE 50 MCG: 50 SPRAY, METERED NASAL at 11:03

## 2024-03-20 RX ADMIN — POTASSIUM CHLORIDE 10 MEQ: 7.46 INJECTION, SOLUTION INTRAVENOUS at 09:03

## 2024-03-20 RX ADMIN — ATORVASTATIN CALCIUM 80 MG: 40 TABLET, FILM COATED ORAL at 09:03

## 2024-03-20 RX ADMIN — AMITRIPTYLINE HYDROCHLORIDE 25 MG: 25 TABLET, FILM COATED ORAL at 11:03

## 2024-03-20 RX ADMIN — POTASSIUM PHOSPHATE, MONOBASIC AND POTASSIUM PHOSPHATE, DIBASIC 15 MMOL: 224; 236 INJECTION, SOLUTION, CONCENTRATE INTRAVENOUS at 10:03

## 2024-03-20 RX ADMIN — IPRATROPIUM BROMIDE AND ALBUTEROL SULFATE 3 ML: .5; 3 SOLUTION RESPIRATORY (INHALATION) at 07:03

## 2024-03-20 RX ADMIN — HYDROCHLOROTHIAZIDE 25 MG: 25 TABLET ORAL at 09:03

## 2024-03-20 RX ADMIN — LOSARTAN POTASSIUM 100 MG: 50 TABLET, FILM COATED ORAL at 09:03

## 2024-03-20 RX ADMIN — ROFLUMILAST 500 MCG: 500 TABLET ORAL at 11:03

## 2024-03-20 NOTE — ED NOTES
"Pt reports feeling SOB with coughing fits and requesting "some oxygen" for comfort. HM notified and pt placed on 1L via NC for comfort.    Pt also given urinal at this time per request.  "

## 2024-03-20 NOTE — ASSESSMENT & PLAN NOTE
"Likely from lung mass. Not quantified as massive. Had "globs of bright red blood and clots", which have started to clear while he was in the obs unit  - hold asa 81 mg for now  - will plan for bronch  "

## 2024-03-20 NOTE — ASSESSMENT & PLAN NOTE
Hilar Mass  History of smoking 30 or more pack years  Centrilobular emphysema  New onset hemoptysis today. No other sources of bleeding.   CTA concerning with L hilar mass and likely vascular involvement  NPO for pulmonary assessment in AM. Likely bronch and biopsy.  Vitals stable, H/H stable. If patient has significant hemoptysis, would need MICU evaluation.  Hold anticoagulation. Type and screen active.

## 2024-03-20 NOTE — H&P
[facility] - Emergency Dept  [facility] Medicine  History & Physical    Patient Name: Jordin Allen Jr.  MRN: [id number]  Patient Class: IP- Inpatient  Admission Date: 3/19/2024  Attending Physician: Jermaine Ponce MD   Primary Care Provider: Sierra Landry MD    Patient information was obtained from patient, past medical records, and ER records.     Subjective:     Principal Problem:Hemoptysis    Chief Complaint:   Chief Complaint   Patient presents with    Cough     Pt. States he is coughing up blood, started this AM.  On ASA daily.        HPI: Mr.William Tiffany Erwin is a 78-year-old man with PMHx significant for centrilobular emphysema/COPD with secondary chronic hypoxic respiratory failure (following with pulmonology Dr. Ward), coronary artery disease, aortic atherosclerosis, hyperlipidemia (atorvastatin 80), hypertension (hydrochlorothiazide 25 and losartan 100), distant but significant smoking history (quit 12 years ago), GERD (omeprazole 20), and obstructive sleep apnea (using home CPAP). Patient presented to the ED with new onset hemoptysis that started earlier today. Blood with clots visible in emesis bag. H/H and vitals stable. He has never had hemoptysis before. No nausea/vomiting. No epistaxis, hematuria, or bloody stools. Patient saw Dr. Ba for regular follow up in cardiology clinic 3/18 due to worsening MONTIEL. TTE 12/2023 unremarkable.    CTA chest concerning with new L hilar mass and probable vascular involvement.     CTA Chest 3/19/24  Lungs/Pleura: There is a 3.4 x 5.8 cm left hilar mass suggesting neoplasm.  The mass surrounds the left upper lobe pulmonary artery with probable invasion of the distal left main bronchus and left upper lobe bronchi.  Probable involvement of the superior left pulmonary vein also.  Few adjacent subcentimeter left upper lobe nodular foci.  Impression:  1. No evidence of pulmonary embolism  2. Left hilar mass suggesting neoplasm with probable vascular involvement  of the left superior pulmonary vein and invasion of the distal left main bronchus and proximal segmental bronchi in the left upper lobe.  Few probable adjacent subcentimeter satellite lesions in the left upper lobe.  See above comments.  Follow-up recommended.  3. Emphysematous changes of the lungs.    Admitted to hospital medicine with stable vitals and on room air. Discussed CT findings with Mr Allen and what some possible next steps could be.    Past Medical History:   Diagnosis Date    Benign neoplasm of colon 10/01/2013    Bronchitis chronic     CAD (coronary artery disease) 10/31/2013    COPD (chronic obstructive pulmonary disease)     Emphysema of lung     GERD (gastroesophageal reflux disease)     Hx of colonic polyps     Hypertension     NAFLD (nonalcoholic fatty liver disease) 03/27/2017    SHOLA on CPAP     RLS (restless legs syndrome)     Screen for colon cancer 08/30/2013       Past Surgical History:   Procedure Laterality Date    bilateral foot surgery  1993    CARDIAC CATHETERIZATION  2013    x1    CATARACT EXTRACTION, BILATERAL      COLON SURGERY  2013    Ace Mott MD    COLONOSCOPY N/A 3/21/2018    Procedure: COLONOSCOPY;  Surgeon: Terry Mott MD;  [facility]  Service: Endoscopy;  Laterality: N/A;    COLONOSCOPY N/A 4/12/2019    Procedure: COLONOSCOPY;  Surgeon: John Mantilla MD;  Location: [facility] ENDO (4TH FLR);  Service: Endoscopy;  Laterality: N/A;    COLONOSCOPY N/A 5/31/2022    Procedure: COLONOSCOPY;  Surgeon: MEDINA Farris MD;  Location: [facility] ENDO (4TH FLR);  Service: Endoscopy;  Laterality: N/A;  fully vacc-inst portal-tb    scrotal abscess removal         Review of patient's allergies indicates:   Allergen Reactions    Brilinta [ticagrelor] Itching    Lisinopril      Other reaction(s): cough    Metoprolol succinate Rash       No current facility-administered medications on file prior to encounter.     Current Outpatient Medications on File Prior to Encounter    Medication Sig    albuterol (ACCUNEB) 0.63 mg/3 mL Nebu Take 3 mLs (0.63 mg total) by nebulization every 6 (six) hours as needed (if symptoms failed to improve with inhaler). Rescue    albuterol (PROVENTIL/VENTOLIN HFA) 90 mcg/actuation inhaler Inhale 2 puffs into the lungs every 4 (four) hours as needed for Wheezing.    amitriptyline (ELAVIL) 25 MG tablet Take 1 tablet (25 mg total) by mouth once daily.    amLODIPine (NORVASC) 5 MG tablet Take 1 tablet (5 mg total) by mouth once daily.    aspirin (ECOTRIN) 81 MG EC tablet Take 81 mg by mouth once daily.     atorvastatin (LIPITOR) 80 MG tablet TAKE 1 TABLET (80 MG TOTAL) BY MOUTH ONCE DAILY.    BETA-CAROTENE,A, W-C & E/MIN (OCUVITE ORAL) Take 1 capsule by mouth once daily.     budesonide-glycopyr-formoterol (BREZTRI AEROSPHERE) 160-9-4.8 mcg/actuation HFAA Inhale 2 puffs into the lungs 2 (two) times a day.    echinacea 400 mg Cap Take 1 capsule by mouth once daily.     fluticasone propionate (FLONASE) 50 mcg/actuation nasal spray 1 spray (50 mcg total) by Each Nostril route once daily.    hydroCHLOROthiazide (HYDRODIURIL) 25 MG tablet Take 1 tablet (25 mg total) by mouth once daily.    ketoconazole (NIZORAL) 2 % cream Apply topically once daily.    latanoprost 0.005 % ophthalmic solution Place 1 drop into both eyes once daily.     losartan (COZAAR) 100 MG tablet Take 1 tablet (100 mg total) by mouth once daily.    nebulizers Misc     nitroGLYCERIN (NITROSTAT) 0.4 MG SL tablet Place 1 tablet (0.4 mg total) under the tongue every 5 (five) minutes as needed.    omeprazole (PRILOSEC) 20 MG capsule Take 1 capsule (20 mg total) by mouth once daily.    predniSONE (DELTASONE) 5 MG tablet TAKE 1 TABLET BY MOUTH EVERY OTHER DAY. TAKE WITH FOOD    roflumilast (DALIRESP) 500 mcg Tab Take 1 tablet (500 mcg total) by mouth once daily.    urea (CARMOL) 40 % Crea Apply topically once daily.     Family History       Problem Relation (Age of Onset)    Heart attack Mother    Heart  disease Mother    Hypertension Mother    No Known Problems Sister, Daughter          Tobacco Use    Smoking status: Former     Current packs/day: 0.00     Average packs/day: 1 pack/day for 40.0 years (40.0 ttl pk-yrs)     Types: Cigarettes     Start date: 8/15/1972     Quit date: 8/15/2012     Years since quittin.6     Passive exposure: Never    Smokeless tobacco: Never   Substance and Sexual Activity    Alcohol use: Yes     Alcohol/week: 3.0 standard drinks of alcohol     Types: 3 Cans of beer per week     Comment: maybe 2-3  beers weekly    Drug use: No    Sexual activity: Yes     Partners: Female     Review of Systems   Constitutional:  Negative for chills, fatigue, fever and unexpected weight change.   HENT:  Negative for nosebleeds and trouble swallowing.    Respiratory:  Positive for cough and shortness of breath. Negative for wheezing.         Hemoptysis, SOB, MONTIEL   Cardiovascular:  Negative for leg swelling.   Gastrointestinal:  Negative for abdominal distention, abdominal pain, blood in stool, constipation, diarrhea, nausea and vomiting.   Genitourinary:  Negative for difficulty urinating and hematuria.   Musculoskeletal:  Negative for gait problem.   Skin:  Negative for wound.   Neurological:  Negative for syncope and weakness.   Hematological:  Does not bruise/bleed easily.   Psychiatric/Behavioral:  The patient is not nervous/anxious.      Objective:     Vital Signs (Most Recent):  Temp: 97.9 °F (36.6 °C) (24 185)  Pulse: 86 (24)  Resp: 17 (24)  BP: (!) 146/90 (24)  SpO2: 99 % (24) Vital Signs (24h Range):  Temp:  [97.9 °F (36.6 °C)] 97.9 °F (36.6 °C)  Pulse:  [] 86  Resp:  [13-21] 17  SpO2:  [95 %-99 %] 99 %  BP: (142-171)/(84-95) 146/90     Weight: 103.4 kg (228 lb)  Body mass index is 34.67 kg/m².     Physical Exam  Constitutional:       General: He is not in acute distress.     Appearance: He is obese. He is not diaphoretic.      Comments:  "Blood and clots visible in emesis bag.   HENT:      Head: Normocephalic and atraumatic.   Eyes:      General: No scleral icterus.     Pupils: Pupils are equal, round, and reactive to light.   Cardiovascular:      Rate and Rhythm: Normal rate and regular rhythm.      Heart sounds: No murmur heard.  Pulmonary:      Effort: Pulmonary effort is normal. No respiratory distress.      Breath sounds: Normal breath sounds. No wheezing.   Chest:      Chest wall: No tenderness.   Abdominal:      General: Abdomen is flat. There is no distension.      Palpations: Abdomen is soft. There is no mass.      Tenderness: There is no abdominal tenderness. There is no guarding.   Musculoskeletal:         General: No swelling or tenderness.   Skin:     General: Skin is warm and dry.      Capillary Refill: Capillary refill takes less than 2 seconds.      Coloration: Skin is not jaundiced.      Findings: No rash.   Neurological:      General: No focal deficit present.      Mental Status: He is alert and oriented to person, place, and time.   Psychiatric:         Mood and Affect: Mood normal.         Thought Content: Thought content normal.       Significant Labs: All pertinent labs within the past 24 hours have been reviewed.  CBC:   Recent Labs   Lab 03/19/24 2018   WBC 11.90   HGB 14.9   HCT 46.6        CMP:   Recent Labs   Lab 03/19/24 2018      K 3.6      CO2 25   *   BUN 23   CREATININE 0.9   CALCIUM 10.3   PROT 8.0   ALBUMIN 3.9   BILITOT 0.7   ALKPHOS 134   AST 19   ALT 27   ANIONGAP 11     Cardiac Markers: No results for input(s): "CKMB", "MYOGLOBIN", "BNP", "TROPISTAT" in the last 48 hours.  Coagulation:   Recent Labs   Lab 03/19/24  2018   INR 1.0   APTT 27.7     Significant Imaging: I have reviewed all pertinent imaging results/findings within the past 24 hours.  Assessment/Plan:     * Hemoptysis  Hilar Mass  History of smoking 30 or more pack years  Centrilobular emphysema  New onset hemoptysis today. " No other sources of bleeding.   CTA concerning with L hilar mass and likely vascular involvement  NPO for pulmonary assessment in AM. Likely bronch and biopsy.  Vitals stable, H/H stable. If patient has significant hemoptysis, would need MICU evaluation.  Hold anticoagulation. Type and screen active.      SHOLA (obstructive sleep apnea)  Continue home CPAP nightly      HTN (hypertension), benign  Chronic, controlled. Continue home losartan, norvasc, and hctz    Centrilobular emphysema  Patient's COPD is controlled currently.  Patient is currently off COPD Pathway. Continue scheduled inhalers and monitor respiratory status closely.       VTE Risk Mitigation (From admission, onward)           Ordered     IP VTE HIGH RISK PATIENT  Once         03/20/24 0125     Place sequential compression device  Until discontinued         03/20/24 0125     Reason for No Pharmacological VTE Prophylaxis  Once        Question:  Reasons:  Answer:  Active Bleeding    03/20/24 0125                     As clarification, the patient should be placed in hospital medicine inpatient services under my care.      Ehsan Yoon MD  Department of Hospital Medicine  [facility] - Emergency Dept

## 2024-03-20 NOTE — NURSING
Nurses Note -- 4 Eyes      3/20/2024   6:30 PM      Skin assessed during: Admit      [x] No Altered Skin Integrity Present    []Prevention Measures Documented      [] Yes- Altered Skin Integrity Present or Discovered   [] LDA Added if Not in Epic (Describe Wound)   [] New Altered Skin Integrity was Present on Admit and Documented in LDA   [] Wound Image Taken    Wound Care Consulted? No    Attending Nurse:  Aba Lock RN/Staff Member:   SALO Dewitt

## 2024-03-20 NOTE — HPI
Mr.William Tiffany Horn. is a 78-year-old man with PMHx significant for centrilobular emphysema/COPD with secondary chronic hypoxic respiratory failure (following with pulmonology Dr. Ward), coronary artery disease, aortic atherosclerosis, hyperlipidemia (atorvastatin 80), hypertension (hydrochlorothiazide 25 and losartan 100), distant but significant smoking history (quit 12 years ago), GERD (omeprazole 20), and obstructive sleep apnea (using home CPAP). Patient presented to the ED with new onset hemoptysis that started earlier today. Blood with clots visible in emesis bag. H/H and vitals stable. He has never had hemoptysis before. No nausea/vomiting. No epistaxis, hematuria, or bloody stools. Patient saw Dr. Ba for regular follow up in cardiology clinic 3/18 due to worsening MONTIEL. TTE 12/2023 unremarkable.    CTA chest concerning with new L hilar mass and probable vascular involvement.     CTA Chest 3/19/24  Lungs/Pleura: There is a 3.4 x 5.8 cm left hilar mass suggesting neoplasm.  The mass surrounds the left upper lobe pulmonary artery with probable invasion of the distal left main bronchus and left upper lobe bronchi.  Probable involvement of the superior left pulmonary vein also.  Few adjacent subcentimeter left upper lobe nodular foci.  Impression:  1. No evidence of pulmonary embolism  2. Left hilar mass suggesting neoplasm with probable vascular involvement of the left superior pulmonary vein and invasion of the distal left main bronchus and proximal segmental bronchi in the left upper lobe.  Few probable adjacent subcentimeter satellite lesions in the left upper lobe.  See above comments.  Follow-up recommended.  3. Emphysematous changes of the lungs.    Admitted to hospital medicine with stable vitals and on room air. Discussed CT findings with Mr Allen and what some possible next steps could be.

## 2024-03-20 NOTE — SUBJECTIVE & OBJECTIVE
Past Medical History:   Diagnosis Date    Benign neoplasm of colon 10/01/2013    Bronchitis chronic     CAD (coronary artery disease) 10/31/2013    COPD (chronic obstructive pulmonary disease)     Emphysema of lung     GERD (gastroesophageal reflux disease)     Hx of colonic polyps     Hypertension     NAFLD (nonalcoholic fatty liver disease) 2017    SHOLA on CPAP     RLS (restless legs syndrome)     Screen for colon cancer 2013       Past Surgical History:   Procedure Laterality Date    bilateral foot surgery      CARDIAC CATHETERIZATION  2013    x1    CATARACT EXTRACTION, BILATERAL      COLON SURGERY      Ace Mott MD    COLONOSCOPY N/A 3/21/2018    Procedure: COLONOSCOPY;  Surgeon: Terry Mott MD;  Location: [facility] ENDO (4TH FLR);  Service: Endoscopy;  Laterality: N/A;    COLONOSCOPY N/A 2019    Procedure: COLONOSCOPY;  Surgeon: John Mantilla MD;  Location: [facility] ENDO (4TH FLR);  Service: Endoscopy;  Laterality: N/A;    COLONOSCOPY N/A 2022    Procedure: COLONOSCOPY;  Surgeon: MEDINA Farris MD;  Location: [facility] ENDO ([place] FLR);  Service: Endoscopy;  Laterality: N/A;  fully vacc-inst portal-tb    scrotal abscess removal         Review of patient's allergies indicates:   Allergen Reactions    Brilinta [ticagrelor] Itching    Lisinopril      Other reaction(s): cough    Metoprolol succinate Rash       Family History       Problem Relation (Age of Onset)    Heart attack Mother    Heart disease Mother    Hypertension Mother    No Known Problems Sister, Daughter          Tobacco Use    Smoking status: Former     Current packs/day: 0.00     Average packs/day: 1 pack/day for 40.0 years (40.0 ttl pk-yrs)     Types: Cigarettes     Start date: 8/15/1972     Quit date: 8/15/2012     Years since quittin.6     Passive exposure: Never    Smokeless tobacco: Never   Substance and Sexual Activity    Alcohol use: Yes     Alcohol/week: 3.0 standard drinks of alcohol     Types: 3 Cans of beer  per week     Comment: maybe 2-3  beers weekly    Drug use: No    Sexual activity: Yes     Partners: Female         Review of Systems   All other systems reviewed and are negative.    Objective:     Vital Signs (Most Recent):  Temp: 97.3 °F (36.3 °C) (03/20/24 1605)  Pulse: 103 (03/20/24 1605)  Resp: 18 (03/20/24 1605)  BP: (!) 142/86 (03/20/24 1605)  SpO2: 95 % (03/20/24 1605) Vital Signs (24h Range):  Temp:  [97.3 °F (36.3 °C)-98.5 °F (36.9 °C)] 97.3 °F (36.3 °C)  Pulse:  [] 103  Resp:  [13-21] 18  SpO2:  [92 %-99 %] 95 %  BP: (142-171)/(84-97) 142/86     Weight: 103.4 kg (228 lb)  Body mass index is 34.67 kg/m².      Intake/Output Summary (Last 24 hours) at 3/20/2024 1636  Last data filed at 3/20/2024 1436  Gross per 24 hour   Intake 350 ml   Output --   Net 350 ml        Physical Exam  Vitals reviewed.   Cardiovascular:      Rate and Rhythm: Normal rate and regular rhythm.   Pulmonary:      Effort: Pulmonary effort is normal. No respiratory distress.      Comments: Inspiratory squeak  and expiratory wheezing left anterior lung field  Musculoskeletal:         General: No swelling.   Neurological:      General: No focal deficit present.      Mental Status: He is alert and oriented to person, place, and time.          Vents:  Oxygen Concentration (%): 21 (03/20/24 0251)    Lines/Drains/Airways       Peripheral Intravenous Line  Duration                  Peripheral IV - Single Lumen 03/19/24 2013 20 G Anterior;Distal;Right Upper Arm <1 day                    Significant Labs:    CBC/Anemia Profile:  Recent Labs   Lab 03/19/24 2018 03/20/24  0143   WBC 11.90 12.02   HGB 14.9 14.1   HCT 46.6 43.2    203   MCV 80* 79*   RDW 15.0* 14.8*        Chemistries:  Recent Labs   Lab 03/19/24 2018 03/20/24  0143    141   K 3.6 3.3*    107   CO2 25 23   BUN 23 19   CREATININE 0.9 0.8   CALCIUM 10.3 9.4   ALBUMIN 3.9 3.6   PROT 8.0 7.2   BILITOT 0.7 0.7   ALKPHOS 134 117   ALT 27 25   AST 19 17   MG  --   1.8   PHOS  --  2.3*       All pertinent labs within the past 24 hours have been reviewed.    Significant Imaging:   I have reviewed all pertinent imaging results/findings within the past 24 hours.  CT: I have reviewed all pertinent results/findings within the past 24 hours and my personal findings are:  a CTA chest which reveals a left hilar mass (3.4 x 5.8 cm) encompassing the left upper lobe pulmonary artery with invasion of the distal left main bronchus and left upper lobe bronchus. There is known emphysematous changes of the lungs.

## 2024-03-20 NOTE — ASSESSMENT & PLAN NOTE
A left hilar mass measuring 3.4 x 5.8 cm, highly concerning for malignancy, is the most likely source of hemoptysis. We will proceed with bronchoscopy on 3/21/24 at 8:00 AM for airway surveillance, bronchoalveolar lavage, and possible endobronchial biopsy. We discussed the benefits versus risks with the patient, including the potential for bleeding (he is taking ASA 81mg, but does not preclude biopsy). The patient will be NPO at midnight. Hold ASA 81 mg today.

## 2024-03-20 NOTE — HPI
"78-year-old male former smoker with a history of COPD on Breztri. He id known to the [facility] pulmonary clinic.  He presented to [facility] after experiencing hemoptysis that began one day prior to admission, characterized as "globs of blood and clots." The hemoptysis has improved and cleared up while he remains in observation unit.    As part of the workup, a CTA chest revealed a left hilar mass measuring 3.4 x 5.8 cm, encompassing the left upper lobe pulmonary artery with invasion of the distal left main bronchus and left upper lobe bronchus. Known emphysematous changes of the lungs were also noted.    Pulmonary function testing from March 2024 showed severe COPD (FEV1 38.6%, FEV/FVC ratio 54) and hyperinflation. A walk test conducted on 3/15/24 showed no desaturation.    Subjectively, he reports no current worsening dyspnea. He had no recent worsening cough or sputum production aside from hemoptysis. He has been using albuterol inhaler every 4 hours as was previously instructed. He is on Breztri.    He takes ASA 81 mg daily. Last dose was 3/19/24.   "

## 2024-03-20 NOTE — ED TRIAGE NOTES
Jordin Allen Jr., a 78 y.o. male presents to the ED w/ complaint of cough. Reports coughing up blood starting toady. Endorses bright red clots. Denies SOB    Triage note:  Chief Complaint   Patient presents with    Cough     Pt. States he is coughing up blood, started this AM.  On ASA daily.     Review of patient's allergies indicates:   Allergen Reactions    Brilinta [ticagrelor] Itching    Lisinopril      Other reaction(s): cough    Metoprolol succinate Rash     Past Medical History:   Diagnosis Date    Benign neoplasm of colon 10/01/2013    Bronchitis chronic     CAD (coronary artery disease) 10/31/2013    COPD (chronic obstructive pulmonary disease)     Emphysema of lung     GERD (gastroesophageal reflux disease)     Hx of colonic polyps     Hypertension     NAFLD (nonalcoholic fatty liver disease) 03/27/2017    SHOLA on CPAP     RLS (restless legs syndrome)     Screen for colon cancer 08/30/2013

## 2024-03-20 NOTE — ASSESSMENT & PLAN NOTE
Known COPD - recent spirometry with severe COPD. On breztri. Not in exacerbation.  - continue triple therapy   - nebulizer while in hospital  - on Rolfumilast

## 2024-03-20 NOTE — CARE UPDATE
Patient reports no hemoptysis since am. Hb stable. He is currently on 2 L supplemental oxygen for comfort. NPO pending pulm eval. Repleting K and phos through IV

## 2024-03-20 NOTE — NURSING
Patient arrived to the unit aaox4. No acute distress noted. No pain at this time. Assessment done at bedside. Instructed pt to use call light. Bed in lowest position. Call light within reach. Patient instructed to call for assistance with ambulation. Questions encouraged and answered. Room supplies provided. Fluid and juice provided per diet order.

## 2024-03-20 NOTE — PROVIDER PROGRESS NOTES - EMERGENCY DEPT.
Emergency Department Interval Progress Note    Jordin Allen Jr.   78 y.o. male   [id number]      3/19/2024       History     This patient was signed out to me by Dr. Perla at shift change pending CTA chest.  He has a history of COPD and presented with cough with several episodes of gross hemoptysis with clots.  No concerning vital sign abnormalities. Normal WBC.  No anemia.  No thrombocytopenia.  Normal electrolytes and renal function.  Negative influenza and COVID tests.  Normal PT, INR, PTT.              Physical Examination     Well-appearing and in no apparent distress.       Labs     Labs Reviewed   CBC W/ AUTO DIFFERENTIAL - Abnormal; Notable for the following components:       Result Value    MCV 80 (*)     MCH 25.7 (*)     RDW 15.0 (*)     Immature Granulocytes 0.6 (*)     Gran # (ANC) 9.1 (*)     Immature Grans (Abs) 0.07 (*)     Gran % 76.5 (*)     Lymph % 14.0 (*)     All other components within normal limits    Narrative:     Release to patient->Immediate   COMPREHENSIVE METABOLIC PANEL - Abnormal; Notable for the following components:    Glucose 115 (*)     All other components within normal limits    Narrative:     Release to patient->Immediate   INFLUENZA A & B BY MOLECULAR   HIV 1 / 2 ANTIBODY    Narrative:     Release to patient->Immediate   HEPATITIS C ANTIBODY    Narrative:     Release to patient->Immediate   PROTIME-INR    Narrative:     Release to patient->Immediate   APTT    Narrative:     Release to patient->Immediate   SARS-COV-2 RNA AMPLIFICATION, QUAL   TYPE & SCREEN        Imaging     Imaging Results               CTA Chest Non-Coronary (PE Studies) (Final result)  Result time 03/19/24 22:12:59      Final result by Ryan Koch MD (03/19/24 22:12:59)                   Impression:      1. No evidence of pulmonary embolism  2. Left hilar mass suggesting neoplasm with probable vascular involvement of the left superior pulmonary vein and invasion of the distal left main bronchus and  proximal segmental bronchi in the left upper lobe.  Few probable adjacent subcentimeter satellite lesions in the left upper lobe.  See above comments.  Follow-up recommended.  3. Emphysematous changes of the lungs.  4. This report was flagged in Epic as abnormal.      Electronically signed by: Ryan Jackson  Date:    03/19/2024  Time:    22:12               Narrative:    EXAMINATION:  CTA CHEST NON CORONARY (PE STUDIES)    CLINICAL HISTORY:  Pulmonary embolism (PE) suspected, unknown D-dimer;    TECHNIQUE:  Low dose axial images, sagittal and coronal reformations were obtained from the thoracic inlet to the lung bases following the IV administration of 100 mL of Omnipaque 350.  Contrast timing was optimized to evaluate the pulmonary arteries.  MIP images were performed.    COMPARISON:  None    FINDINGS:  Pulmonary arteries:Pulmonary arteries are adequately enhanced.No filling defects to indicate pulmonary embolism.    Thoracic soft tissues: Unremarkable.    Aorta: Left-sided aortic arch.  No aneurysm or dissection.    Heart: Normal size. No effusion.    Nathalie/Mediastinum: No pathologic carolyn enlargement.    Airways: Patent.    Lungs/Pleura: There is a 3.4 x 5.8 cm left hilar mass suggesting neoplasm.  The mass surrounds the left upper lobe pulmonary artery with probable invasion of the distal left main bronchus and left upper lobe bronchi.  Probable involvement of the superior left pulmonary vein also.  Few adjacent subcentimeter left upper lobe nodular foci.    Moderate diffuse emphysematous changes of the lungs.    Esophagus: Unremarkable.    Upper Abdomen: No abnormality of the partially imaged upper abdomen.    Bones: No acute fracture. No suspicious lytic or sclerotic lesions.                                       X-Ray Chest AP Portable (Final result)  Result time 03/19/24 21:11:15      Final result by Popeye Mckeon DO (03/19/24 21:11:15)                   Impression:      No acute  abnormality.      Electronically signed by: Popeye Mckeon  Date:    03/19/2024  Time:    21:11               Narrative:    EXAMINATION:  XR CHEST AP PORTABLE    CLINICAL HISTORY:  cough;    TECHNIQUE:  Single frontal view of the chest was performed.    COMPARISON:  12/21/2022.    FINDINGS:  The lungs are well expanded and clear. No focal opacities are seen. The pleural spaces are clear. The cardiac silhouette is unremarkable. The visualized osseous structures are unremarkable.                                        ED Course     The patient received the following medications:  Medications   iohexoL (OMNIPAQUE 350) injection 100 mL (100 mLs Intravenous Given 3/19/24 2103)             Medical Decision Making     Hilar mass with probable vascular involvement on CTA chest.    Admitted to  for further evaluation/management.        Diagnoses       ICD-10-CM ICD-9-CM   1. Cough with hemoptysis  R04.2 786.39   2. Hilar mass  R91.8 786.6         Dispostion      ED Disposition Condition    Admit

## 2024-03-20 NOTE — CONSULTS
"[facility] ([place] [address])  Pulmonology  Consult Note    Patient Name: Jordin Allen Jr.  MRN: [id number]  Admission Date: 3/19/2024  Hospital Length of Stay: 0 days  Code Status: Full Code  Attending Physician: Jermaine Ponce MD  Primary Care Provider: Sierra Landry MD   Principal Problem: Hemoptysis    Inpatient consult to Pulmonology  Consult performed by: Isaiah Churchill MD  Consult ordered by: Ehsan Yoon MD  Assessment/Recommendations: Please see consult note        Subjective:     HPI:  78-year-old male former smoker with a history of COPD on Breztri. He id known to the [facility] [place] pulmonary clinic.  He presented to [facility] after experiencing hemoptysis that began one day prior to admission, characterized as "globs of blood and clots." The hemoptysis has improved and cleared up while he remains in observation unit.    As part of the workup, a CTA chest revealed a left hilar mass measuring 3.4 x 5.8 cm, encompassing the left upper lobe pulmonary artery with invasion of the distal left main bronchus and left upper lobe bronchus. Known emphysematous changes of the lungs were also noted.    Pulmonary function testing from March 2024 showed severe COPD (FEV1 38.6%, FEV/FVC ratio 54) and hyperinflation. A walk test conducted on 3/15/24 showed no desaturation.    Subjectively, he reports no current worsening dyspnea. He had no recent worsening cough or sputum production aside from hemoptysis. He has been using albuterol inhaler every 4 hours as was previously instructed. He is on Breztri.    He takes ASA 81 mg daily. Last dose was 3/19/24.     Past Medical History:   Diagnosis Date    Benign neoplasm of colon 10/01/2013    Bronchitis chronic     CAD (coronary artery disease) 10/31/2013    COPD (chronic obstructive pulmonary disease)     Emphysema of lung     GERD (gastroesophageal reflux disease)     Hx of colonic polyps     Hypertension     NAFLD (nonalcoholic fatty liver disease) 03/27/2017 "    SHOLA on CPAP     RLS (restless legs syndrome)     Screen for colon cancer 2013       Past Surgical History:   Procedure Laterality Date    bilateral foot surgery      CARDIAC CATHETERIZATION  2013    x1    CATARACT EXTRACTION, BILATERAL      COLON SURGERY      Ace Mott MD    COLONOSCOPY N/A 3/21/2018    Procedure: COLONOSCOPY;  Surgeon: Terry Mott MD;  [facility]  Service: Endoscopy;  Laterality: N/A;    COLONOSCOPY N/A 2019    Procedure: COLONOSCOPY;  Surgeon: John Mantilla MD;  Location: [facility] ENDO ([facility] FLR);  Service: Endoscopy;  Laterality: N/A;    COLONOSCOPY N/A 2022    Procedure: COLONOSCOPY;  Surgeon: MEDINA Farris MD;  Location: [facility] ENDO ([facility] FLR);  Service: Endoscopy;  Laterality: N/A;  fully vacc-inst portal-tb    scrotal abscess removal         Review of patient's allergies indicates:   Allergen Reactions    Brilinta [ticagrelor] Itching    Lisinopril      Other reaction(s): cough    Metoprolol succinate Rash       Family History       Problem Relation (Age of Onset)    Heart attack Mother    Heart disease Mother    Hypertension Mother    No Known Problems Sister, Daughter          Tobacco Use    Smoking status: Former     Current packs/day: 0.00     Average packs/day: 1 pack/day for 40.0 years (40.0 ttl pk-yrs)     Types: Cigarettes     Start date: 8/15/1972     Quit date: 8/15/2012     Years since quittin.6     Passive exposure: Never    Smokeless tobacco: Never   Substance and Sexual Activity    Alcohol use: Yes     Alcohol/week: 3.0 standard drinks of alcohol     Types: 3 Cans of beer per week     Comment: maybe 2-3  beers weekly    Drug use: No    Sexual activity: Yes     Partners: Female         Review of Systems   All other systems reviewed and are negative.    Objective:     Vital Signs (Most Recent):  Temp: 97.3 °F (36.3 °C) (24 1605)  Pulse: 103 (24 1605)  Resp: 18 (24 1605)  BP: (!) 142/86 (24  1605)  SpO2: 95 % (03/20/24 1605) Vital Signs (24h Range):  Temp:  [97.3 °F (36.3 °C)-98.5 °F (36.9 °C)] 97.3 °F (36.3 °C)  Pulse:  [] 103  Resp:  [13-21] 18  SpO2:  [92 %-99 %] 95 %  BP: (142-171)/(84-97) 142/86     Weight: 103.4 kg (228 lb)  Body mass index is 34.67 kg/m².      Intake/Output Summary (Last 24 hours) at 3/20/2024 1636  Last data filed at 3/20/2024 1436  Gross per 24 hour   Intake 350 ml   Output --   Net 350 ml        Physical Exam  Vitals reviewed.   Cardiovascular:      Rate and Rhythm: Normal rate and regular rhythm.   Pulmonary:      Effort: Pulmonary effort is normal. No respiratory distress.      Comments: Inspiratory squeak  and expiratory wheezing left anterior lung field  Musculoskeletal:         General: No swelling.   Neurological:      General: No focal deficit present.      Mental Status: He is alert and oriented to person, place, and time.          Vents:  Oxygen Concentration (%): 21 (03/20/24 0251)    Lines/Drains/Airways       Peripheral Intravenous Line  Duration                  Peripheral IV - Single Lumen 03/19/24 2013 20 G Anterior;Distal;Right Upper Arm <1 day                    Significant Labs:    CBC/Anemia Profile:  Recent Labs   Lab 03/19/24  2018 03/20/24  0143   WBC 11.90 12.02   HGB 14.9 14.1   HCT 46.6 43.2    203   MCV 80* 79*   RDW 15.0* 14.8*        Chemistries:  Recent Labs   Lab 03/19/24  2018 03/20/24  0143    141   K 3.6 3.3*    107   CO2 25 23   BUN 23 19   CREATININE 0.9 0.8   CALCIUM 10.3 9.4   ALBUMIN 3.9 3.6   PROT 8.0 7.2   BILITOT 0.7 0.7   ALKPHOS 134 117   ALT 27 25   AST 19 17   MG  --  1.8   PHOS  --  2.3*       All pertinent labs within the past 24 hours have been reviewed.    Significant Imaging:   I have reviewed all pertinent imaging results/findings within the past 24 hours.  CT: I have reviewed all pertinent results/findings within the past 24 hours and my personal findings are:  a CTA chest which reveals a left hilar  "mass (3.4 x 5.8 cm) encompassing the left upper lobe pulmonary artery with invasion of the distal left main bronchus and left upper lobe bronchus. There is known emphysematous changes of the lungs.   Assessment/Plan:     Pulmonary  * Hemoptysis  Likely from lung mass. Not quantified as massive. Had "globs of bright red blood and clots", which have started to clear while he was in the obs unit  - hold asa 81 mg for now  - will plan for bronch    Hilar mass    A left hilar mass measuring 3.4 x 5.8 cm, highly concerning for malignancy, is the most likely source of hemoptysis. We will proceed with bronchoscopy on 3/21/24 at 8:00 AM for airway surveillance, bronchoalveolar lavage, and possible endobronchial biopsy. We discussed the benefits versus risks with the patient, including the potential for bleeding (he is taking ASA 81mg, but does not preclude biopsy). The patient will be NPO at midnight. Hold ASA 81 mg today.    Chronic obstructive pulmonary disease  Known COPD - recent spirometry with severe COPD. On breztri. Not in exacerbation.  - continue triple therapy   - nebulizer while in hospital  - on Rolfumilast            Thank you for your consult. I will follow-up with patient. Please contact us if you have any additional questions.     Isaiah Churchill MD  Pulmonology  [facility] ([facility])      "

## 2024-03-20 NOTE — ED NOTES
Assumed care of patient. Pt remains on cardiac monitor. Bed placed in low locked position, side rails up x2, call bell is within reach.

## 2024-03-20 NOTE — ED PROVIDER NOTES
Encounter Date: 3/19/2024       History     Chief Complaint   Patient presents with    Cough     Pt. States he is coughing up blood, started this AM.  On ASA daily.     HPI    78-year-old male past medical history COPD, hypertension, CAD who presents to the ER for evaluation of hemoptysis.  Patient states that he has had several episodes of coughing up blood since this morning.  He takes an aspirin daily.  These episodes include clots, he was not vomiting.  No inciting event that he is aware of.  No change in medication.  He denies any chest pain, dyspnea, nausea, vomiting, abdominal pain, syncope, trauma, or any headache.      Review of patient's allergies indicates:   Allergen Reactions    Brilinta [ticagrelor] Itching    Lisinopril      Other reaction(s): cough    Metoprolol succinate Rash     Past Medical History:   Diagnosis Date    Benign neoplasm of colon 10/01/2013    Bronchitis chronic     CAD (coronary artery disease) 10/31/2013    COPD (chronic obstructive pulmonary disease)     Emphysema of lung     GERD (gastroesophageal reflux disease)     Hx of colonic polyps     Hypertension     NAFLD (nonalcoholic fatty liver disease) 03/27/2017    SHOLA on CPAP     RLS (restless legs syndrome)     Screen for colon cancer 08/30/2013     Past Surgical History:   Procedure Laterality Date    bilateral foot surgery  1993    CARDIAC CATHETERIZATION  2013    x1    CATARACT EXTRACTION, BILATERAL      COLON SURGERY  2013    Ace Mott MD    COLONOSCOPY N/A 3/21/2018    Procedure: COLONOSCOPY;  Surgeon: Terry Mott MD;  Location: [address]);  Service: Endoscopy;  Laterality: N/A;    COLONOSCOPY N/A 4/12/2019    Procedure: COLONOSCOPY;  Surgeon: John Mantilla MD;  Location: [address]);  Service: Endoscopy;  Laterality: N/A;    COLONOSCOPY N/A 5/31/2022    Procedure: COLONOSCOPY;  Surgeon: MEDINA Farris MD;  Location: [facility] ([address] [place] [address]);  Service: Endoscopy;  Laterality: N/A;  fully vacc-inst  portal-tb    scrotal abscess removal       Family History   Problem Relation Age of Onset    Heart disease Mother     Heart attack Mother     Hypertension Mother     No Known Problems Sister     No Known Problems Daughter     Asthma Neg Hx     Emphysema Neg Hx     Prostate cancer Neg Hx     Cirrhosis Neg Hx      Social History     Tobacco Use    Smoking status: Former     Current packs/day: 0.00     Average packs/day: 1 pack/day for 40.0 years (40.0 ttl pk-yrs)     Types: Cigarettes     Start date: 8/15/1972     Quit date: 8/15/2012     Years since quittin.6     Passive exposure: Never    Smokeless tobacco: Never   Substance Use Topics    Alcohol use: Yes     Alcohol/week: 3.0 standard drinks of alcohol     Types: 3 Cans of beer per week     Comment: maybe 2-3  beers weekly    Drug use: No     Review of Systems   Constitutional:  Negative for fever.   HENT:  Negative for sore throat.    Respiratory:  Positive for cough.    Cardiovascular:  Negative for chest pain.   Gastrointestinal:  Negative for nausea.   Genitourinary:  Negative for dysuria.   Musculoskeletal:  Negative for back pain.   Neurological:  Negative for weakness.       Physical Exam     Initial Vitals [24 1851]   BP Pulse Resp Temp SpO2   (!) 145/95 (!) 112 18 97.9 °F (36.6 °C) 95 %      MAP       --         Physical Exam    Nursing note and vitals reviewed.  Constitutional: He appears well-nourished.   HENT:   Head: Normocephalic and atraumatic.   Eyes: Conjunctivae and EOM are normal.   Neck: Neck supple.   Cardiovascular:  Normal rate and regular rhythm.           Pulmonary/Chest: Breath sounds normal. No respiratory distress.   Musculoskeletal:         General: No edema. Normal range of motion.      Cervical back: Neck supple.     Neurological: He is alert and oriented to person, place, and time.   Skin: Skin is warm and dry.   Psychiatric: He has a normal mood and affect. Thought content normal.         ED Course   Procedures  Labs  Reviewed   HIV 1 / 2 ANTIBODY   HEPATITIS C ANTIBODY   CBC W/ AUTO DIFFERENTIAL   COMPREHENSIVE METABOLIC PANEL   PROTIME-INR   APTT   TYPE & SCREEN          Imaging Results    None          Medications - No data to display  Medical Decision Making  Amount and/or Complexity of Data Reviewed  Labs: ordered. Decision-making details documented in ED Course.  Radiology: ordered and independent interpretation performed. Decision-making details documented in ED Course.  ECG/medicine tests: ordered and independent interpretation performed. Decision-making details documented in ED Course.    Risk  Prescription drug management.                     EKG   Rate 94   Normal sinus rhythm   First-degree AV block  PACs present   No STEMI             79 y/o M here with hemoptysis.  VSS in ED, no tachycardia, lungs clear.  EKG no STEMI.  Normal WBC, normal lytes, COVID negative.  CXR with no acute findings.  No episodes of hemoptysis here  Pending CTA results at shift change, oncoming MD to follow and dispo.      Clinical Impression:  Final diagnoses:  [R05.9] Vivek Ramos MD  03/19/24 5315

## 2024-03-20 NOTE — ED NOTES
Tele box #[id number] placed on pt and called telemetry to admit patient.  Will call back shortly to verify Rate and rhythm.

## 2024-03-20 NOTE — ED NOTES
Received report from SALO Palacio. Assumed care of pt.    The patient is resting quietly in ED stretcher, and is AAOx4 at this time. Respirations are even and unlabored, with no distress noted. The patient is aware of POC and all questions and concerns addressed at this time. The patient was offered restroom assistance and denies need to void. The patient denies further needs and has no complaints at this time. SR raised x2, bed locked and in low position with brake engaged. Call bell within reach and the patient verbalized he would call for assistance if needed. Personal belongings are at bedside within reach.

## 2024-03-20 NOTE — SUBJECTIVE & OBJECTIVE
Past Medical History:   Diagnosis Date    Benign neoplasm of colon 10/01/2013    Bronchitis chronic     CAD (coronary artery disease) 10/31/2013    COPD (chronic obstructive pulmonary disease)     Emphysema of lung     GERD (gastroesophageal reflux disease)     Hx of colonic polyps     Hypertension     NAFLD (nonalcoholic fatty liver disease) 03/27/2017    SHOLA on CPAP     RLS (restless legs syndrome)     Screen for colon cancer 08/30/2013       Past Surgical History:   Procedure Laterality Date    bilateral foot surgery  1993    CARDIAC CATHETERIZATION  2013    x1    CATARACT EXTRACTION, BILATERAL      COLON SURGERY  2013    Ace Mott MD    COLONOSCOPY N/A 3/21/2018    Procedure: COLONOSCOPY;  Surgeon: Terry Mott MD;  Location: [facility] ENDO (4TH FLR);  Service: Endoscopy;  Laterality: N/A;    COLONOSCOPY N/A 4/12/2019    Procedure: COLONOSCOPY;  Surgeon: John Mantilla MD;  Location: [facility] ENDO (4TH FLR);  Service: Endoscopy;  Laterality: N/A;    COLONOSCOPY N/A 5/31/2022    Procedure: COLONOSCOPY;  Surgeon: MEDINA Farris MD;  [facility]  Service: Endoscopy;  Laterality: N/A;  fully vacc-inst portal-tb    scrotal abscess removal         Review of patient's allergies indicates:   Allergen Reactions    Brilinta [ticagrelor] Itching    Lisinopril      Other reaction(s): cough    Metoprolol succinate Rash       No current facility-administered medications on file prior to encounter.     Current Outpatient Medications on File Prior to Encounter   Medication Sig    albuterol (ACCUNEB) 0.63 mg/3 mL Nebu Take 3 mLs (0.63 mg total) by nebulization every 6 (six) hours as needed (if symptoms failed to improve with inhaler). Rescue    albuterol (PROVENTIL/VENTOLIN HFA) 90 mcg/actuation inhaler Inhale 2 puffs into the lungs every 4 (four) hours as needed for Wheezing.    amitriptyline (ELAVIL) 25 MG tablet Take 1 tablet (25 mg total) by mouth once daily.    amLODIPine (NORVASC) 5 MG tablet Take 1  tablet (5 mg total) by mouth once daily.    aspirin (ECOTRIN) 81 MG EC tablet Take 81 mg by mouth once daily.     atorvastatin (LIPITOR) 80 MG tablet TAKE 1 TABLET (80 MG TOTAL) BY MOUTH ONCE DAILY.    BETA-CAROTENE,A, W-C & E/MIN (OCUVITE ORAL) Take 1 capsule by mouth once daily.     budesonide-glycopyr-formoterol (BREZTRI AEROSPHERE) 160-9-4.8 mcg/actuation HFAA Inhale 2 puffs into the lungs 2 (two) times a day.    echinacea 400 mg Cap Take 1 capsule by mouth once daily.     fluticasone propionate (FLONASE) 50 mcg/actuation nasal spray 1 spray (50 mcg total) by Each Nostril route once daily.    hydroCHLOROthiazide (HYDRODIURIL) 25 MG tablet Take 1 tablet (25 mg total) by mouth once daily.    ketoconazole (NIZORAL) 2 % cream Apply topically once daily.    latanoprost 0.005 % ophthalmic solution Place 1 drop into both eyes once daily.     losartan (COZAAR) 100 MG tablet Take 1 tablet (100 mg total) by mouth once daily.    nebulizers Misc     nitroGLYCERIN (NITROSTAT) 0.4 MG SL tablet Place 1 tablet (0.4 mg total) under the tongue every 5 (five) minutes as needed.    omeprazole (PRILOSEC) 20 MG capsule Take 1 capsule (20 mg total) by mouth once daily.    predniSONE (DELTASONE) 5 MG tablet TAKE 1 TABLET BY MOUTH EVERY OTHER DAY. TAKE WITH FOOD    roflumilast (DALIRESP) 500 mcg Tab Take 1 tablet (500 mcg total) by mouth once daily.    urea (CARMOL) 40 % Crea Apply topically once daily.     Family History       Problem Relation (Age of Onset)    Heart attack Mother    Heart disease Mother    Hypertension Mother    No Known Problems Sister, Daughter          Tobacco Use    Smoking status: Former     Current packs/day: 0.00     Average packs/day: 1 pack/day for 40.0 years (40.0 ttl pk-yrs)     Types: Cigarettes     Start date: 8/15/1972     Quit date: 8/15/2012     Years since quittin.6     Passive exposure: Never    Smokeless tobacco: Never   Substance and Sexual Activity    Alcohol use: Yes     Alcohol/week: 3.0  standard drinks of alcohol     Types: 3 Cans of beer per week     Comment: maybe 2-3  beers weekly    Drug use: No    Sexual activity: Yes     Partners: Female     Review of Systems   Constitutional:  Negative for chills, fatigue, fever and unexpected weight change.   HENT:  Negative for nosebleeds and trouble swallowing.    Respiratory:  Positive for cough and shortness of breath. Negative for wheezing.         Hemoptysis, SOB, MONTIEL   Cardiovascular:  Negative for leg swelling.   Gastrointestinal:  Negative for abdominal distention, abdominal pain, blood in stool, constipation, diarrhea, nausea and vomiting.   Genitourinary:  Negative for difficulty urinating and hematuria.   Musculoskeletal:  Negative for gait problem.   Skin:  Negative for wound.   Neurological:  Negative for syncope and weakness.   Hematological:  Does not bruise/bleed easily.   Psychiatric/Behavioral:  The patient is not nervous/anxious.      Objective:     Vital Signs (Most Recent):  Temp: 97.9 °F (36.6 °C) (03/19/24 1851)  Pulse: 86 (03/19/24 2032)  Resp: 17 (03/19/24 2032)  BP: (!) 146/90 (03/19/24 2032)  SpO2: 99 % (03/19/24 2032) Vital Signs (24h Range):  Temp:  [97.9 °F (36.6 °C)] 97.9 °F (36.6 °C)  Pulse:  [] 86  Resp:  [13-21] 17  SpO2:  [95 %-99 %] 99 %  BP: (142-171)/(84-95) 146/90     Weight: 103.4 kg (228 lb)  Body mass index is 34.67 kg/m².     Physical Exam  Constitutional:       General: He is not in acute distress.     Appearance: He is obese. He is not diaphoretic.      Comments: Blood and clots visible in emesis bag.   HENT:      Head: Normocephalic and atraumatic.   Eyes:      General: No scleral icterus.     Pupils: Pupils are equal, round, and reactive to light.   Cardiovascular:      Rate and Rhythm: Normal rate and regular rhythm.      Heart sounds: No murmur heard.  Pulmonary:      Effort: Pulmonary effort is normal. No respiratory distress.      Breath sounds: Normal breath sounds. No wheezing.   Chest:      Chest  "wall: No tenderness.   Abdominal:      General: Abdomen is flat. There is no distension.      Palpations: Abdomen is soft. There is no mass.      Tenderness: There is no abdominal tenderness. There is no guarding.   Musculoskeletal:         General: No swelling or tenderness.   Skin:     General: Skin is warm and dry.      Capillary Refill: Capillary refill takes less than 2 seconds.      Coloration: Skin is not jaundiced.      Findings: No rash.   Neurological:      General: No focal deficit present.      Mental Status: He is alert and oriented to person, place, and time.   Psychiatric:         Mood and Affect: Mood normal.         Thought Content: Thought content normal.       Significant Labs: All pertinent labs within the past 24 hours have been reviewed.  CBC:   Recent Labs   Lab 03/19/24 2018   WBC 11.90   HGB 14.9   HCT 46.6        CMP:   Recent Labs   Lab 03/19/24 2018      K 3.6      CO2 25   *   BUN 23   CREATININE 0.9   CALCIUM 10.3   PROT 8.0   ALBUMIN 3.9   BILITOT 0.7   ALKPHOS 134   AST 19   ALT 27   ANIONGAP 11     Cardiac Markers: No results for input(s): "CKMB", "MYOGLOBIN", "BNP", "TROPISTAT" in the last 48 hours.  Coagulation:   Recent Labs   Lab 03/19/24 2018   INR 1.0   APTT 27.7     Significant Imaging: I have reviewed all pertinent imaging results/findings within the past 24 hours.  "

## 2024-03-21 LAB
ALBUMIN SERPL BCP-MCNC: 3.5 G/DL (ref 3.5–5.2)
ALP SERPL-CCNC: 110 U/L (ref 55–135)
ALT SERPL W/O P-5'-P-CCNC: 25 U/L (ref 10–44)
ANION GAP SERPL CALC-SCNC: 11 MMOL/L (ref 8–16)
APPEARANCE FLD: NORMAL
AST SERPL-CCNC: 19 U/L (ref 10–40)
BASOPHILS # BLD AUTO: 0.08 K/UL (ref 0–0.2)
BASOPHILS NFR BLD: 0.7 % (ref 0–1.9)
BILIRUB SERPL-MCNC: 1.4 MG/DL (ref 0.1–1)
BODY FLD TYPE: NORMAL
BUN SERPL-MCNC: 20 MG/DL (ref 8–23)
CALCIUM SERPL-MCNC: 9.4 MG/DL (ref 8.7–10.5)
CHLORIDE SERPL-SCNC: 103 MMOL/L (ref 95–110)
CO2 SERPL-SCNC: 25 MMOL/L (ref 23–29)
COLOR FLD: NORMAL
CREAT SERPL-MCNC: 0.9 MG/DL (ref 0.5–1.4)
DIFFERENTIAL METHOD BLD: ABNORMAL
EOSINOPHIL # BLD AUTO: 0.3 K/UL (ref 0–0.5)
EOSINOPHIL NFR BLD: 2.8 % (ref 0–8)
EOSINOPHIL NFR FLD MANUAL: 4 %
ERYTHROCYTE [DISTWIDTH] IN BLOOD BY AUTOMATED COUNT: 14.9 % (ref 11.5–14.5)
EST. GFR  (NO RACE VARIABLE): >60 ML/MIN/1.73 M^2
GLUCOSE SERPL-MCNC: 100 MG/DL (ref 70–110)
HCT VFR BLD AUTO: 43.5 % (ref 40–54)
HGB BLD-MCNC: 14 G/DL (ref 14–18)
IMM GRANULOCYTES # BLD AUTO: 0.07 K/UL (ref 0–0.04)
IMM GRANULOCYTES NFR BLD AUTO: 0.6 % (ref 0–0.5)
KOH PREP SPEC: NORMAL
LYMPHOCYTES # BLD AUTO: 2.1 K/UL (ref 1–4.8)
LYMPHOCYTES NFR BLD: 17.3 % (ref 18–48)
LYMPHOCYTES NFR FLD MANUAL: 14 %
MAGNESIUM SERPL-MCNC: 1.8 MG/DL (ref 1.6–2.6)
MCH RBC QN AUTO: 26.1 PG (ref 27–31)
MCHC RBC AUTO-ENTMCNC: 32.2 G/DL (ref 32–36)
MCV RBC AUTO: 81 FL (ref 82–98)
MONOCYTES # BLD AUTO: 0.9 K/UL (ref 0.3–1)
MONOCYTES NFR BLD: 7.4 % (ref 4–15)
MONOS+MACROS NFR FLD MANUAL: 27 %
NEUTROPHILS # BLD AUTO: 8.6 K/UL (ref 1.8–7.7)
NEUTROPHILS NFR BLD: 71.2 % (ref 38–73)
NEUTROPHILS NFR FLD MANUAL: 55 %
NRBC BLD-RTO: 0 /100 WBC
PHOSPHATE SERPL-MCNC: 3.7 MG/DL (ref 2.7–4.5)
PLATELET # BLD AUTO: 209 K/UL (ref 150–450)
PMV BLD AUTO: 10.8 FL (ref 9.2–12.9)
POCT GLUCOSE: 112 MG/DL (ref 70–110)
POCT GLUCOSE: 115 MG/DL (ref 70–110)
POCT GLUCOSE: 131 MG/DL (ref 70–110)
POTASSIUM SERPL-SCNC: 3.6 MMOL/L (ref 3.5–5.1)
PROT SERPL-MCNC: 6.3 G/DL (ref 6–8.4)
RBC # BLD AUTO: 5.37 M/UL (ref 4.6–6.2)
SODIUM SERPL-SCNC: 139 MMOL/L (ref 136–145)
WBC # BLD AUTO: 12.03 K/UL (ref 3.9–12.7)
WBC # FLD: 166 /CU MM

## 2024-03-21 PROCEDURE — 87205 SMEAR GRAM STAIN: CPT | Performed by: STUDENT IN AN ORGANIZED HEALTH CARE EDUCATION/TRAINING PROGRAM

## 2024-03-21 PROCEDURE — 63600175 PHARM REV CODE 636 W HCPCS: Performed by: INTERNAL MEDICINE

## 2024-03-21 PROCEDURE — 31625 BRONCHOSCOPY W/BIOPSY(S): CPT | Performed by: INTERNAL MEDICINE

## 2024-03-21 PROCEDURE — 25000003 PHARM REV CODE 250: Performed by: STUDENT IN AN ORGANIZED HEALTH CARE EDUCATION/TRAINING PROGRAM

## 2024-03-21 PROCEDURE — 84100 ASSAY OF PHOSPHORUS: CPT | Performed by: STUDENT IN AN ORGANIZED HEALTH CARE EDUCATION/TRAINING PROGRAM

## 2024-03-21 PROCEDURE — 94640 AIRWAY INHALATION TREATMENT: CPT

## 2024-03-21 PROCEDURE — 27201011 HC FORCEPS DISPOSABLE: Performed by: INTERNAL MEDICINE

## 2024-03-21 PROCEDURE — 87102 FUNGUS ISOLATION CULTURE: CPT | Performed by: STUDENT IN AN ORGANIZED HEALTH CARE EDUCATION/TRAINING PROGRAM

## 2024-03-21 PROCEDURE — 80053 COMPREHEN METABOLIC PANEL: CPT | Performed by: STUDENT IN AN ORGANIZED HEALTH CARE EDUCATION/TRAINING PROGRAM

## 2024-03-21 PROCEDURE — 27000221 HC OXYGEN, UP TO 24 HOURS

## 2024-03-21 PROCEDURE — 94761 N-INVAS EAR/PLS OXIMETRY MLT: CPT

## 2024-03-21 PROCEDURE — 87070 CULTURE OTHR SPECIMN AEROBIC: CPT | Performed by: STUDENT IN AN ORGANIZED HEALTH CARE EDUCATION/TRAINING PROGRAM

## 2024-03-21 PROCEDURE — 99153 MOD SED SAME PHYS/QHP EA: CPT | Performed by: INTERNAL MEDICINE

## 2024-03-21 PROCEDURE — 85025 COMPLETE CBC W/AUTO DIFF WBC: CPT | Performed by: STUDENT IN AN ORGANIZED HEALTH CARE EDUCATION/TRAINING PROGRAM

## 2024-03-21 PROCEDURE — 31624 DX BRONCHOSCOPE/LAVAGE: CPT | Performed by: INTERNAL MEDICINE

## 2024-03-21 PROCEDURE — 36415 COLL VENOUS BLD VENIPUNCTURE: CPT | Performed by: STUDENT IN AN ORGANIZED HEALTH CARE EDUCATION/TRAINING PROGRAM

## 2024-03-21 PROCEDURE — 21400001 HC TELEMETRY ROOM

## 2024-03-21 PROCEDURE — 0BB88ZX EXCISION OF LEFT UPPER LOBE BRONCHUS, VIA NATURAL OR ARTIFICIAL OPENING ENDOSCOPIC, DIAGNOSTIC: ICD-10-PCS | Performed by: INTERNAL MEDICINE

## 2024-03-21 PROCEDURE — 89051 BODY FLUID CELL COUNT: CPT | Performed by: STUDENT IN AN ORGANIZED HEALTH CARE EDUCATION/TRAINING PROGRAM

## 2024-03-21 PROCEDURE — 99233 SBSQ HOSP IP/OBS HIGH 50: CPT | Mod: 25,GC,, | Performed by: INTERNAL MEDICINE

## 2024-03-21 PROCEDURE — 99900035 HC TECH TIME PER 15 MIN (STAT)

## 2024-03-21 PROCEDURE — 25000242 PHARM REV CODE 250 ALT 637 W/ HCPCS: Performed by: INTERNAL MEDICINE

## 2024-03-21 PROCEDURE — 25000003 PHARM REV CODE 250: Performed by: INTERNAL MEDICINE

## 2024-03-21 PROCEDURE — 83735 ASSAY OF MAGNESIUM: CPT | Performed by: STUDENT IN AN ORGANIZED HEALTH CARE EDUCATION/TRAINING PROGRAM

## 2024-03-21 PROCEDURE — 99152 MOD SED SAME PHYS/QHP 5/>YRS: CPT | Performed by: INTERNAL MEDICINE

## 2024-03-21 PROCEDURE — 87210 SMEAR WET MOUNT SALINE/INK: CPT | Performed by: STUDENT IN AN ORGANIZED HEALTH CARE EDUCATION/TRAINING PROGRAM

## 2024-03-21 RX ORDER — BENZONATATE 100 MG/1
100 CAPSULE ORAL 3 TIMES DAILY PRN
Status: DISCONTINUED | OUTPATIENT
Start: 2024-03-21 | End: 2024-03-22 | Stop reason: HOSPADM

## 2024-03-21 RX ORDER — FENTANYL CITRATE 50 UG/ML
INJECTION, SOLUTION INTRAMUSCULAR; INTRAVENOUS CODE/TRAUMA/SEDATION MEDICATION
Status: COMPLETED | OUTPATIENT
Start: 2024-03-21 | End: 2024-03-21

## 2024-03-21 RX ORDER — LIDOCAINE HYDROCHLORIDE 10 MG/ML
INJECTION INFILTRATION; PERINEURAL CODE/TRAUMA/SEDATION MEDICATION
Status: COMPLETED | OUTPATIENT
Start: 2024-03-21 | End: 2024-03-21

## 2024-03-21 RX ORDER — LIDOCAINE HYDROCHLORIDE 20 MG/ML
INJECTION, SOLUTION INFILTRATION; PERINEURAL CODE/TRAUMA/SEDATION MEDICATION
Status: COMPLETED | OUTPATIENT
Start: 2024-03-21 | End: 2024-03-21

## 2024-03-21 RX ORDER — MIDAZOLAM HYDROCHLORIDE 5 MG/ML
INJECTION INTRAMUSCULAR; INTRAVENOUS CODE/TRAUMA/SEDATION MEDICATION
Status: COMPLETED | OUTPATIENT
Start: 2024-03-21 | End: 2024-03-21

## 2024-03-21 RX ADMIN — FENTANYL CITRATE 25 MCG: 50 INJECTION, SOLUTION INTRAMUSCULAR; INTRAVENOUS at 08:03

## 2024-03-21 RX ADMIN — LIDOCAINE HYDROCHLORIDE 2 ML: 20 INJECTION, SOLUTION INFILTRATION; PERINEURAL at 08:03

## 2024-03-21 RX ADMIN — FLUTICASONE FUROATE AND VILANTEROL TRIFENATATE 1 PUFF: 200; 25 POWDER RESPIRATORY (INHALATION) at 10:03

## 2024-03-21 RX ADMIN — LOSARTAN POTASSIUM 100 MG: 50 TABLET, FILM COATED ORAL at 09:03

## 2024-03-21 RX ADMIN — BENZONATATE 100 MG: 100 CAPSULE ORAL at 09:03

## 2024-03-21 RX ADMIN — MIDAZOLAM 1 MG: 5 INJECTION INTRAMUSCULAR; INTRAVENOUS at 08:03

## 2024-03-21 RX ADMIN — PANTOPRAZOLE SODIUM 40 MG: 40 TABLET, DELAYED RELEASE ORAL at 09:03

## 2024-03-21 RX ADMIN — ATORVASTATIN CALCIUM 80 MG: 40 TABLET, FILM COATED ORAL at 09:03

## 2024-03-21 RX ADMIN — IPRATROPIUM BROMIDE AND ALBUTEROL SULFATE 3 ML: .5; 3 SOLUTION RESPIRATORY (INHALATION) at 09:03

## 2024-03-21 RX ADMIN — IPRATROPIUM BROMIDE AND ALBUTEROL SULFATE 3 ML: .5; 3 SOLUTION RESPIRATORY (INHALATION) at 12:03

## 2024-03-21 RX ADMIN — AMLODIPINE BESYLATE 5 MG: 5 TABLET ORAL at 09:03

## 2024-03-21 RX ADMIN — AMITRIPTYLINE HYDROCHLORIDE 25 MG: 25 TABLET, FILM COATED ORAL at 09:03

## 2024-03-21 RX ADMIN — MIDAZOLAM 2 MG: 5 INJECTION INTRAMUSCULAR; INTRAVENOUS at 08:03

## 2024-03-21 RX ADMIN — TIOTROPIUM BROMIDE INHALATION SPRAY 2 PUFF: 3.12 SPRAY, METERED RESPIRATORY (INHALATION) at 12:03

## 2024-03-21 RX ADMIN — ROFLUMILAST 500 MCG: 500 TABLET ORAL at 10:03

## 2024-03-21 RX ADMIN — LIDOCAINE HYDROCHLORIDE 2 ML: 10 INJECTION, SOLUTION INFILTRATION; PERINEURAL at 08:03

## 2024-03-21 RX ADMIN — HYDROCHLOROTHIAZIDE 25 MG: 25 TABLET ORAL at 09:03

## 2024-03-21 RX ADMIN — FLUTICASONE PROPIONATE 50 MCG: 50 SPRAY, METERED NASAL at 09:03

## 2024-03-21 RX ADMIN — LIDOCAINE HYDROCHLORIDE 8 ML: 10 INJECTION, SOLUTION INFILTRATION; PERINEURAL at 08:03

## 2024-03-21 RX ADMIN — FENTANYL CITRATE 50 MCG: 50 INJECTION, SOLUTION INTRAMUSCULAR; INTRAVENOUS at 08:03

## 2024-03-21 NOTE — ED NOTES
Specimens obtained during Bronchoscopy:  BAL SANJAY 60ml nacl inserted with a return of 10ml.  SANJAY EBBX obtained. Verbal report will be given to RN to include documentation charted in procedural sedation documentation.  Patient to be in NPO 1 hour post procedure and place in PO tolerance at 1000am.  The patient tolerated the procedure well.

## 2024-03-21 NOTE — ED NOTES
Moderate concious sedation was performed and cardiorespiratory functions were monitored the entire procedure by Blanche Carrillo RN.  Sedation began at 838am  and concluded at 915am.

## 2024-03-21 NOTE — PLAN OF CARE
"[facility] ([facility])  Initial Discharge Assessment       Primary Care Provider: Sierra Landry MD    Admission Diagnosis: SHOLA (obstructive sleep apnea) [G47.33]  HTN (hypertension), benign [I10]  Hilar mass [R91.8]  Cough with hemoptysis [R04.2]  Centrilobular emphysema [J43.2]  Hemoptysis [R04.2]  History of smoking 30 or more pack years [Z87.891]    Admission Date: 3/19/2024  Expected Discharge Date: 3/22/2024    Transition of Care Barriers: (P) None    Payor: MEDICARE / Plan: MEDICARE PART A & B / Product Type: Government /     Extended Emergency Contact Information  Primary Emergency Contact: Marzena Schwartz   [facility]  Home Phone: [phone]  Mobile Phone: [phone]  Relation: Daughter  Secondary Emergency Contact: Marilou Grimaldo  Mobile Phone: [phone]  Relation: Daughter  Preferred language: English   needed? No    Discharge Plan A: (P) Home with family  Discharge Plan B: (P) Home      [facility]/pharmacy #[id number] - [place], [place] - [address] [facility]  [address] [facility] [address]  Phone: [phone] Fax: [phone]    [facility] - [place] - [facility]  [place] [address]  Phone: [phone] Fax: [phone]    [facility] [address] [address]  Phone: [phone] Fax: [phone]      Initial Assessment (most recent)       Adult Discharge Assessment - 03/21/24 1450          Discharge Assessment    Assessment Type Discharge Planning Assessment     Confirmed/corrected address, phone number and insurance Yes     Confirmed Demographics Correct on Facesheet     Source of Information patient     Communicated MEI with patient/caregiver Yes     Reason For Admission "coughing up blood" (P)      People in Home alone (P)      Do you expect to return to your current living situation? Yes (P)   "    Do you have help at home or someone to help you manage your care at home? Yes (P)      Who are your caregiver(s) and their phone number(s)? Lives alone but daughters live nearby and assist as needed (P)      Prior to hospitilization cognitive status: Alert/Oriented (P)      Current cognitive status: Alert/Oriented (P)      Walking or Climbing Stairs Difficulty no (P)      Dressing/Bathing Difficulty no (P)      Home Accessibility wheelchair accessible (P)      Home Layout Able to live on 1st floor (P)      Equipment Currently Used at Home CPAP (P)      Readmission within 30 days? No (P)      Patient currently being followed by outpatient case management? No (P)      Do you currently have service(s) that help you manage your care at home? No (P)      Do you take prescription medications? Yes (P)      Do you have prescription coverage? Yes (P)      Coverage MEDICARE - MEDICARE PART A & B - (P)      Do you have any problems affording any of your prescribed medications? No (P)      Is the patient taking medications as prescribed? yes (P)      Who is going to help you get home at discharge? Marzena Schwartz (Daughter) [phone] (P)      How do you get to doctors appointments? car, drives self (P)      Are you on dialysis? No (P)      Do you take coumadin? No (P)      Discharge Plan A Home with family (P)      Discharge Plan B Home (P)      DME Needed Upon Discharge  none (P)      Discharge Plan discussed with: Patient (P)      Transition of Care Barriers None (P)         Physical Activity    On average, how many days per week do you engage in moderate to strenuous exercise (like a brisk walk)? 0 days (P)      On average, how many minutes do you engage in exercise at this level? 0 min (P)         Financial Resource Strain    How hard is it for you to pay for the very basics like food, housing, medical care, and heating? Not very hard (P)         Housing Stability    In the last 12 months, was there a time when you were  not able to pay the mortgage or rent on time? No (P)      In the last 12 months, how many places have you lived? 1 (P)      In the last 12 months, was there a time when you did not have a steady place to sleep or slept in a shelter (including now)? No (P)         Transportation Needs    In the past 12 months, has lack of transportation kept you from medical appointments or from getting medications? No (P)      In the past 12 months, has lack of transportation kept you from meetings, work, or from getting things needed for daily living? No (P)         Food Insecurity    Within the past 12 months, you worried that your food would run out before you got the money to buy more. Never true (P)      Within the past 12 months, the food you bought just didn't last and you didn't have money to get more. Never true (P)         Stress    Do you feel stress - tense, restless, nervous, or anxious, or unable to sleep at night because your mind is troubled all the time - these days? Only a little (P)         Social Connections    In a typical week, how many times do you talk on the phone with family, friends, or neighbors? More than three times a week (P)      How often do you get together with friends or relatives? More than three times a week (P)      How often do you attend Religion or Oriental orthodox services? More than 4 times per year (P)      Do you belong to any clubs or organizations such as Religion groups, unions, fraternal or athletic groups, or school groups? Yes (P)      How often do you attend meetings of the clubs or organizations you belong to? More than 4 times per year (P)         Alcohol Use    Q1: How often do you have a drink containing alcohol? 2-4 times a month (P)      Q2: How many drinks containing alcohol do you have on a typical day when you are drinking? 1 or 2 (P)      Q3: How often do you have six or more drinks on one occasion? Never (P)                  Discharge Plan A and Plan B have been determined by  review of patient's clinical status, future medical and therapeutic needs, and coverage/benefits for post-acute care in coordination with multidisciplinary team members.                   BOBBY Ge, LMSW  [facility]  Case Management  Ext. [phone]

## 2024-03-21 NOTE — PLAN OF CARE
[facility]:    Primary Attending:  Frances Gasca MD   Consultant Attending: Isaiah Churchill MD   Consultant Fellow: JUAN PABLO ZunigaVI     Admit Date: 3/19/2024   Hospital LOS: 1     Brief Summary of Hospitalization:  78M former smoker with hx of COPD on Breztri known to Pulmonary clinic who presented to [facility] with c/o hemoptysis that began 1 day PTA. Hemoptysis improved, but in workup, he had a CTA chest noting a L hilar mass measuring 3.4x5.8 cm encompassing the SANJAY PA with invasion of the distal L main bronchus and SANJAY bronchus as well as known emphysematous changes. Pulm consulted for bronchoscopy.     Interval Hx:  NPO past midnight for bronchoscopy. Underwent this morning at 8am w/o difficulty.    Objective:  Last 24 Hour Vital Signs:  BP  Min: 116/75  Max: 151/91  Temp  Av.9 °F (36.6 °C)  Min: 97.3 °F (36.3 °C)  Max: 98.6 °F (37 °C)  Pulse  Av.3  Min: 79  Max: 120  Resp  Av.3  Min: 14  Max: 24  SpO2  Av.9 %  Min: 94 %  Max: 99 %  Body mass index is 34.67 kg/m².  I & O (Last 24H):  Intake/Output Summary (Last 24 hours) at 3/21/2024 0921  Last data filed at 3/20/2024 1436  Gross per 24 hour   Intake 350 ml   Output --   Net 350 ml       Physical Exam:  GEN: non-toxic appearing and appears stated age  HEENT: MMM, no scleral icterus, EOMI  NECK: Supple, midline trachea, no raised JVP or a-waves notable  CV: RRR, no MRG, pulses equal and symmetric 2+ at radial  PULM: CTAB  ABDOMEN: Soft, non-tender, non-distended, no rebound or guarding  SKIN: Warm, dry, intact, no rashes  MSK: No deformity, no clubbing, cyanosis, or lower extremity edema  NEURO: RASS 0, CAM -, awake and following commands, at neurologic baseline  LINES: Intact, no extravasation or induration, no erythema to PIV or support devices    I have reviewed all labs / images / cultures  Pertinent labs are as follows:   No results for input(s): "PH", "PCO2", "PO2", "HCO3", "POCSATURATED", " ""BE" in the last 24 hours.  Recent Labs   Lab 03/21/24  0411   WBC 12.03   RBC 5.37   HGB 14.0   HCT 43.5      MCV 81*   MCH 26.1*   MCHC 32.2     Recent Labs   Lab 03/21/24  0411      K 3.6      CO2 25   BUN 20   CREATININE 0.9   CALCIUM 9.4   MG 1.8       Microbiology:  Microbiology x 7d:   Microbiology Results (last 7 days)       Procedure Component Value Units Date/Time    Influenza A & B by Molecular [[id number]] Collected: 03/19/24 2016    Order Status: Completed Specimen: Nasopharyngeal Swab Updated: 03/19/24 2047     Influenza A, Molecular Negative     Influenza B, Molecular Negative     Flu A & B Source Nasal swab          CTA chest reviewed:   L hilar mass measuring 3.4x5.8 cm encompassing the SANJAY PA with invasion of the distal L main bronchus and SANJAY bronchus as well as known emphysematous changes.    Meds:   albuterol-ipratropium  3 mL Nebulization Q6H    amitriptyline  25 mg Oral Daily    amLODIPine  5 mg Oral Daily    atorvastatin  80 mg Oral Daily    fluticasone furoate-vilanteroL  1 puff Inhalation Daily    fluticasone propionate  1 spray Each Nostril Daily    hydroCHLOROthiazide  25 mg Oral Daily    losartan  100 mg Oral Daily    pantoprazole  40 mg Oral Daily    roflumilast  1 tablet Oral Daily    tiotropium bromide  2 puff Inhalation Daily       Assessment & Plan: Jordin Allen Jr. is a 78 y.o. male former smoker admitted for hemoptysis and found to have a L hilar mass with invasion of L main PA an dL mainstem bronchus. Overall picture is concerning for malignancy. Bronchoscopy performed this am, noted to have an endobronchial mass proximal to secondary viraj at take off of SANJAY. Multiple endobronchial biopsies and BAL were performed and sent for micro, chemistry and cytology.    Problem List:  L Hilar mass  Hemoptysis  COPD    Plan  S/p bronchoscopy, there was a large endobronchial mass seen proximal to take off of SANJAY. Endobronchial biopsy x 3 and BAL were performed. " Hemostasis achieved.   Follow up cultures, cytology. Overall picture concerning for malignancy though also sent for KOH and fungal.  No TB risk factors  Hemoptysis:  Improved. Hemostasis was achieved  Monitor and if >150ml/24h or 100ml/h notify pulm  Resume daily baby aspirin tomorrow  COPD:  Continue triple therapy   On Daliresp, continue  Duonebs while in hospital  Picture not c/w AECOPD, no need for steroids  Patient seen and examined with Dr. Churchill    We will continue to follow with you, thank you for the opportunity to be involved in Mr. Allen's care.    TANVIR Zuniga VI  PCCM fellow

## 2024-03-21 NOTE — PROGRESS NOTES
[facility] Progress [facility]:    Primary Attending:  Frances Gasca MD   Consultant Attending: Isaiah Churchill MD   Consultant Fellow: JUAN PABLO ZunigaVI     Admit Date: 3/19/2024   Hospital LOS: 1     Brief Summary of Hospitalization:  78M former smoker with hx of COPD on Breztri known to Pulmonary clinic who presented to [facility] with c/o hemoptysis that began 1 day PTA. Hemoptysis improved, but in workup, he had a CTA chest noting a L hilar mass measuring 3.4x5.8 cm encompassing the SANJAY PA with invasion of the distal L main bronchus and SANJAY bronchus as well as known emphysematous changes. Pulm consulted for bronchoscopy.     Interval Hx:  NPO past midnight for bronchoscopy. Underwent this morning at 8am w/o difficulty.    Objective:  Last 24 Hour Vital Signs:  BP  Min: 116/75  Max: 151/91  Temp  Av.9 °F (36.6 °C)  Min: 97.3 °F (36.3 °C)  Max: 98.6 °F (37 °C)  Pulse  Av.3  Min: 79  Max: 120  Resp  Av.3  Min: 14  Max: 24  SpO2  Av.9 %  Min: 94 %  Max: 99 %  Body mass index is 34.67 kg/m².  I & O (Last 24H):  Intake/Output Summary (Last 24 hours) at 3/21/2024 0921  Last data filed at 3/20/2024 1436  Gross per 24 hour   Intake 350 ml   Output --   Net 350 ml       Physical Exam:  GEN: non-toxic appearing and appears stated age  HEENT: MMM, no scleral icterus, EOMI  NECK: Supple, midline trachea, no raised JVP or a-waves notable  CV: RRR, no MRG, pulses equal and symmetric 2+ at radial  PULM: CTAB  ABDOMEN: Soft, non-tender, non-distended, no rebound or guarding  SKIN: Warm, dry, intact, no rashes  MSK: No deformity, no clubbing, cyanosis, or lower extremity edema  NEURO: RASS 0, CAM -, awake and following commands, at neurologic baseline  LINES: Intact, no extravasation or induration, no erythema to PIV or support devices    I have reviewed all labs / images / cultures  Pertinent labs are as follows:   No results for input(s): "PH", "PCO2", "PO2", "HCO3", "POCSATURATED", " ""BE" in the last 24 hours.  Recent Labs   Lab 03/21/24  0411   WBC 12.03   RBC 5.37   HGB 14.0   HCT 43.5      MCV 81*   MCH 26.1*   MCHC 32.2     Recent Labs   Lab 03/21/24  0411      K 3.6      CO2 25   BUN 20   CREATININE 0.9   CALCIUM 9.4   MG 1.8       Microbiology:  Microbiology x 7d:   Microbiology Results (last 7 days)       Procedure Component Value Units Date/Time    Influenza A & B by Molecular [[id number]] Collected: 03/19/24 2016    Order Status: Completed Specimen: Nasopharyngeal Swab Updated: 03/19/24 2047     Influenza A, Molecular Negative     Influenza B, Molecular Negative     Flu A & B Source Nasal swab          CTA chest reviewed:   L hilar mass measuring 3.4x5.8 cm encompassing the SANJAY PA with invasion of the distal L main bronchus and SANJAY bronchus as well as known emphysematous changes.    Meds:   albuterol-ipratropium  3 mL Nebulization Q6H    amitriptyline  25 mg Oral Daily    amLODIPine  5 mg Oral Daily    atorvastatin  80 mg Oral Daily    fluticasone furoate-vilanteroL  1 puff Inhalation Daily    fluticasone propionate  1 spray Each Nostril Daily    hydroCHLOROthiazide  25 mg Oral Daily    losartan  100 mg Oral Daily    pantoprazole  40 mg Oral Daily    roflumilast  1 tablet Oral Daily    tiotropium bromide  2 puff Inhalation Daily       Assessment & Plan: Jordin Allen Jr. is a 78 y.o. male former smoker admitted for hemoptysis and found to have a L hilar mass with invasion of L main PA an dL mainstem bronchus. Overall picture is concerning for malignancy. Bronchoscopy performed this am, noted to have an endobronchial mass proximal to secondary viraj at take off of SANJAY. Multiple endobronchial biopsies and BAL were performed and sent for micro, chemistry and cytology.    Problem List:  L Hilar mass  Hemoptysis  COPD    Plan  S/p bronchoscopy, there was a large endobronchial mass seen proximal to take off of SANJAY. Endobronchial biopsy x 3 and BAL were performed. " Hemostasis achieved.   Follow up cultures, cytology. Overall picture concerning for malignancy though also sent for KOH and fungal.  No TB risk factors  Hemoptysis:  Improved. Hemostasis was achieved  Monitor and if >150ml/24h or 100ml/h notify pulm  Resume daily baby aspirin tomorrow  COPD:  Continue triple therapy   On Daliresp, continue  Duonebs while in hospital  Picture not c/w AECOPD, no need for steroids  Patient seen and examined with Dr. Churchill    We will continue to follow with you, thank you for the opportunity to be involved in Mr. Allen's care.    TANVIR Zuniga VI  PCCM fellow

## 2024-03-21 NOTE — ED NOTES
H and P updated-yes, patient placed on cardiac monitor, anesthesia Plan:  Conscious sedation, ASA verified-yes, Airway exam performed-yes, Personal or Family history of anesthesia complications-No  Consent signed and witnessed, Blanche Carrillo RN

## 2024-03-22 VITALS
RESPIRATION RATE: 17 BRPM | HEART RATE: 86 BPM | HEIGHT: 68 IN | BODY MASS INDEX: 34.56 KG/M2 | SYSTOLIC BLOOD PRESSURE: 128 MMHG | DIASTOLIC BLOOD PRESSURE: 68 MMHG | TEMPERATURE: 98 F | OXYGEN SATURATION: 98 % | WEIGHT: 228 LBS

## 2024-03-22 LAB
ALBUMIN SERPL BCP-MCNC: 3.4 G/DL (ref 3.5–5.2)
ALP SERPL-CCNC: 104 U/L (ref 55–135)
ALT SERPL W/O P-5'-P-CCNC: 21 U/L (ref 10–44)
ANION GAP SERPL CALC-SCNC: 10 MMOL/L (ref 8–16)
AST SERPL-CCNC: 16 U/L (ref 10–40)
BASOPHILS # BLD AUTO: 0.06 K/UL (ref 0–0.2)
BASOPHILS NFR BLD: 0.5 % (ref 0–1.9)
BILIRUB SERPL-MCNC: 1.3 MG/DL (ref 0.1–1)
BUN SERPL-MCNC: 34 MG/DL (ref 8–23)
CALCIUM SERPL-MCNC: 9.2 MG/DL (ref 8.7–10.5)
CHLORIDE SERPL-SCNC: 102 MMOL/L (ref 95–110)
CO2 SERPL-SCNC: 27 MMOL/L (ref 23–29)
CREAT SERPL-MCNC: 1.4 MG/DL (ref 0.5–1.4)
DIFFERENTIAL METHOD BLD: ABNORMAL
EOSINOPHIL # BLD AUTO: 0.4 K/UL (ref 0–0.5)
EOSINOPHIL NFR BLD: 3.1 % (ref 0–8)
ERYTHROCYTE [DISTWIDTH] IN BLOOD BY AUTOMATED COUNT: 15.1 % (ref 11.5–14.5)
EST. GFR  (NO RACE VARIABLE): 52.1 ML/MIN/1.73 M^2
GLUCOSE SERPL-MCNC: 120 MG/DL (ref 70–110)
HCT VFR BLD AUTO: 40 % (ref 40–54)
HGB BLD-MCNC: 12.9 G/DL (ref 14–18)
IMM GRANULOCYTES # BLD AUTO: 0.05 K/UL (ref 0–0.04)
IMM GRANULOCYTES NFR BLD AUTO: 0.4 % (ref 0–0.5)
LYMPHOCYTES # BLD AUTO: 2.3 K/UL (ref 1–4.8)
LYMPHOCYTES NFR BLD: 18.1 % (ref 18–48)
MAGNESIUM SERPL-MCNC: 2 MG/DL (ref 1.6–2.6)
MCH RBC QN AUTO: 26.2 PG (ref 27–31)
MCHC RBC AUTO-ENTMCNC: 32.3 G/DL (ref 32–36)
MCV RBC AUTO: 81 FL (ref 82–98)
MONOCYTES # BLD AUTO: 1.1 K/UL (ref 0.3–1)
MONOCYTES NFR BLD: 8.2 % (ref 4–15)
NEUTROPHILS # BLD AUTO: 9 K/UL (ref 1.8–7.7)
NEUTROPHILS NFR BLD: 69.7 % (ref 38–73)
NRBC BLD-RTO: 0 /100 WBC
PHOSPHATE SERPL-MCNC: 3.7 MG/DL (ref 2.7–4.5)
PLATELET # BLD AUTO: 195 K/UL (ref 150–450)
PMV BLD AUTO: 10.7 FL (ref 9.2–12.9)
POCT GLUCOSE: 104 MG/DL (ref 70–110)
POCT GLUCOSE: 121 MG/DL (ref 70–110)
POCT GLUCOSE: 125 MG/DL (ref 70–110)
POTASSIUM SERPL-SCNC: 3.1 MMOL/L (ref 3.5–5.1)
PROT SERPL-MCNC: 6.6 G/DL (ref 6–8.4)
RBC # BLD AUTO: 4.92 M/UL (ref 4.6–6.2)
SODIUM SERPL-SCNC: 139 MMOL/L (ref 136–145)
WBC # BLD AUTO: 12.86 K/UL (ref 3.9–12.7)

## 2024-03-22 PROCEDURE — 85025 COMPLETE CBC W/AUTO DIFF WBC: CPT | Performed by: STUDENT IN AN ORGANIZED HEALTH CARE EDUCATION/TRAINING PROGRAM

## 2024-03-22 PROCEDURE — 25000003 PHARM REV CODE 250: Performed by: STUDENT IN AN ORGANIZED HEALTH CARE EDUCATION/TRAINING PROGRAM

## 2024-03-22 PROCEDURE — 80053 COMPREHEN METABOLIC PANEL: CPT | Performed by: INTERNAL MEDICINE

## 2024-03-22 PROCEDURE — 84100 ASSAY OF PHOSPHORUS: CPT | Performed by: INTERNAL MEDICINE

## 2024-03-22 PROCEDURE — 36415 COLL VENOUS BLD VENIPUNCTURE: CPT | Performed by: STUDENT IN AN ORGANIZED HEALTH CARE EDUCATION/TRAINING PROGRAM

## 2024-03-22 PROCEDURE — 25000242 PHARM REV CODE 250 ALT 637 W/ HCPCS: Performed by: INTERNAL MEDICINE

## 2024-03-22 PROCEDURE — 27000221 HC OXYGEN, UP TO 24 HOURS

## 2024-03-22 PROCEDURE — 94761 N-INVAS EAR/PLS OXIMETRY MLT: CPT

## 2024-03-22 PROCEDURE — 83735 ASSAY OF MAGNESIUM: CPT | Performed by: INTERNAL MEDICINE

## 2024-03-22 PROCEDURE — 94640 AIRWAY INHALATION TREATMENT: CPT

## 2024-03-22 PROCEDURE — 36415 COLL VENOUS BLD VENIPUNCTURE: CPT | Mod: XB | Performed by: INTERNAL MEDICINE

## 2024-03-22 PROCEDURE — 99900035 HC TECH TIME PER 15 MIN (STAT)

## 2024-03-22 RX ORDER — BENZONATATE 100 MG/1
100 CAPSULE ORAL 3 TIMES DAILY PRN
Qty: 30 CAPSULE | Refills: 0 | Status: SHIPPED | OUTPATIENT
Start: 2024-03-22 | End: 2024-03-22

## 2024-03-22 RX ORDER — PREDNISONE 20 MG/1
40 TABLET ORAL DAILY
Qty: 10 TABLET | Refills: 0 | Status: SHIPPED | OUTPATIENT
Start: 2024-03-22 | End: 2024-03-27

## 2024-03-22 RX ORDER — FLUTICASONE PROPIONATE 50 MCG
2 SPRAY, SUSPENSION (ML) NASAL DAILY
Qty: 16 G | Refills: 11 | Status: SHIPPED | OUTPATIENT
Start: 2024-03-22

## 2024-03-22 RX ORDER — PREDNISONE 20 MG/1
40 TABLET ORAL DAILY
Status: DISCONTINUED | OUTPATIENT
Start: 2024-03-22 | End: 2024-03-22

## 2024-03-22 RX ORDER — BENZONATATE 100 MG/1
100 CAPSULE ORAL 3 TIMES DAILY PRN
Qty: 30 CAPSULE | Refills: 0 | Status: SHIPPED | OUTPATIENT
Start: 2024-03-22 | End: 2024-04-02

## 2024-03-22 RX ADMIN — FLUTICASONE FUROATE AND VILANTEROL TRIFENATATE 1 PUFF: 200; 25 POWDER RESPIRATORY (INHALATION) at 07:03

## 2024-03-22 RX ADMIN — HYDROCHLOROTHIAZIDE 25 MG: 25 TABLET ORAL at 08:03

## 2024-03-22 RX ADMIN — IPRATROPIUM BROMIDE AND ALBUTEROL SULFATE 3 ML: .5; 3 SOLUTION RESPIRATORY (INHALATION) at 12:03

## 2024-03-22 RX ADMIN — TIOTROPIUM BROMIDE INHALATION SPRAY 2 PUFF: 3.12 SPRAY, METERED RESPIRATORY (INHALATION) at 07:03

## 2024-03-22 RX ADMIN — IPRATROPIUM BROMIDE AND ALBUTEROL SULFATE 3 ML: .5; 3 SOLUTION RESPIRATORY (INHALATION) at 01:03

## 2024-03-22 RX ADMIN — IPRATROPIUM BROMIDE AND ALBUTEROL SULFATE 3 ML: .5; 3 SOLUTION RESPIRATORY (INHALATION) at 07:03

## 2024-03-22 RX ADMIN — BENZONATATE 100 MG: 100 CAPSULE ORAL at 08:03

## 2024-03-22 RX ADMIN — BENZONATATE 100 MG: 100 CAPSULE ORAL at 05:03

## 2024-03-22 RX ADMIN — AMITRIPTYLINE HYDROCHLORIDE 25 MG: 25 TABLET, FILM COATED ORAL at 08:03

## 2024-03-22 RX ADMIN — PANTOPRAZOLE SODIUM 40 MG: 40 TABLET, DELAYED RELEASE ORAL at 08:03

## 2024-03-22 RX ADMIN — LOSARTAN POTASSIUM 100 MG: 50 TABLET, FILM COATED ORAL at 08:03

## 2024-03-22 RX ADMIN — ATORVASTATIN CALCIUM 80 MG: 40 TABLET, FILM COATED ORAL at 08:03

## 2024-03-22 RX ADMIN — FLUTICASONE PROPIONATE 50 MCG: 50 SPRAY, METERED NASAL at 08:03

## 2024-03-22 RX ADMIN — AMLODIPINE BESYLATE 5 MG: 5 TABLET ORAL at 08:03

## 2024-03-22 RX ADMIN — ROFLUMILAST 500 MCG: 500 TABLET ORAL at 08:03

## 2024-03-22 NOTE — NURSING
Pt PIV removed per hospital policy.  Pt verbalized understanding of d/c paperwork and denied and questions or concerns.  Pt escorted via wheelchair to be discharged.  All personal belongings in hand NADN; VSS

## 2024-03-22 NOTE — PLAN OF CARE
CHW scheduled pcp f/u   Future Appointments   Date Time Provider Department Center   4/1/2024  8:00 AM Florence Veloz MD [facility] IM [facility] PCW   4/2/2024 10:20 AM Bienvenido Ward MD [facility] PULMON [place]   4/30/2024  7:30 AM CARDIAC, PET IMAGING [facility] JOSE [facility]   5/6/2024 10:30 AM Garrett Lloyd DPM [facility] PODIA [place]   6/6/2024  4:30 PM Prince Ba MD [facility] CARDIO [facility]   6/10/2024 10:00 AM [facility]  CT1 OC CTSCAN [place]

## 2024-03-22 NOTE — PLAN OF CARE
Discharge Plan A and Plan B have been determined by review of patient's clinical status, future medical and therapeutic needs, and coverage/benefits for post-acute care in coordination with multidisciplinary team members.    03/22/24 1413   Post-Acute Status   Post-Acute Authorization HME   HME Status Pending post-acute provider review/more information requested   Coverage MEDICARE - MEDICARE PART A & B -   Discharge Plan   Discharge Plan A Home with family   Discharge Plan B Home     SW received notification that patient requires home oxygen upon dc Today. SW referred to [facility] for home oxygen needs. HME pending post-acute provider review.    3:30pm  Per [facility], patient approved for home oxygen. Equipment to be delivered to the bedside upon dc home Today.    SW will continue to follow.                      BOBBY Ge, LMSW  [facility]  Case Management  Ext. [phone]

## 2024-03-22 NOTE — PROGRESS NOTES
Home Oxygen Evaluation    Date Performed: 3/22/2024    1) Patient's Home O2 Sat on room air, while at rest: 95        If O2 sats on room air at rest are 88% or below, patient qualifies. No additional testing needed. Document N/A in steps 2 and 3. If 89% or above, complete steps 2.      2) Patient's O2 Sat on room air while exercisin          If O2 sats on room air while exercising remain 89% or above patient does not qualify, no further testing needed Document N/A in step 3. If O2 sats on room air while exercising are 88% or below, continue to step 3.      3) Patient's O2 Sat while exercising on O2: 93 at 2 LPM         (Must show improvement from #2 for patients to qualify)    If O2 sats improve on oxygen, patient qualifies for portable oxygen. If not, the patient does not qualify.

## 2024-03-22 NOTE — PLAN OF CARE
CHW met with patient/family at bedside. Patient experience rounding completed and reviewed the following.     Do you know your discharge plan? Yes  If yes, what is the plan? (Home, with daughter)     Have you discussed your needs and preferences with your SW/CM? Yes     If you are discharging home, do you have help at home? Yes   Do you think you will need help additional at home at discharge?  No     Do you currently have difficulty keeping up with bills, affording medicine or buying food?  No    Assigned SW/CM notified of any patient/family needs or concerns. Appropriate resources provided to address patient's needs.  No needs/concerns- coughing

## 2024-03-22 NOTE — ASSESSMENT & PLAN NOTE
Hilar Mass  History of smoking 30 or more pack years  Centrilobular emphysema  New onset hemoptysis today. No other sources of bleeding.   CTA concerning with L hilar mass and likely vascular involvement  Bronchoscopy 3/20/2023 found Left upper lobe lung mass suspicious for cancer. Biopsy taken and sent for cytology  Vitals stable, H/H stable. If patient has significant hemoptysis, would need MICU evaluation.  Hold anticoagulation. Type and screen active.  Referral to oncology will be outpatient for evaluation and management

## 2024-03-22 NOTE — PLAN OF CARE
Weaned to room air. Path pending from biopsy, does not need to remain inpatient for this. Would have ambulatory walk test to see if he qualifies for home o2 prior to discharge.     Pulm will follow up studies from bronch and call with results. No further to add from our standpoint, no objection to discharge. Pulm will sign off.

## 2024-03-22 NOTE — PROGRESS NOTES
[facility] Medicine  Progress note    Team: [facility] HOSP MED Q Frances Gasca MD  Admit Date: 3/19/2024    Principal Problem:  Hemoptysis    Interval hx:  Hemoptysis has resolved    PEx  Temp:  [97.7 °F (36.5 °C)-98.6 °F (37 °C)]   Pulse:  []   Resp:  [14-24]   BP: ()/(53-91)   SpO2:  [94 %-99 %]   No intake or output data in the 24 hours ending 03/22/24 0057    General Appearance: no acute distress, WDWN  Heart: regular rate and rhythm, no heave  Respiratory: Normal respiratory effort, symmetric excursion, bilateral vesicular breath sounds   Abdomen: Soft, non-tender; bowel sounds active  Skin: intact, no rash, no ulcers  Neurologic:  No focal numbness or weakness  Mental status: Alert, oriented x 4, affect appropriate     Recent Labs   Lab 03/19/24 2018 03/20/24  0143 03/21/24  0411   WBC 11.90 12.02 12.03   HGB 14.9 14.1 14.0   HCT 46.6 43.2 43.5    203 209     Recent Labs   Lab 03/19/24 2018 03/20/24  0143 03/21/24  0411    141 139   K 3.6 3.3* 3.6    107 103   CO2 25 23 25   BUN 23 19 20   CREATININE 0.9 0.8 0.9   * 118* 100   CALCIUM 10.3 9.4 9.4   MG  --  1.8 1.8   PHOS  --  2.3* 3.7     Recent Labs   Lab 03/19/24 2018 03/20/24  0143 03/21/24  0411   ALKPHOS 134 117 110   ALT 27 25 25   AST 19 17 19   ALBUMIN 3.9 3.6 3.5   PROT 8.0 7.2 6.3   BILITOT 0.7 0.7 1.4*   INR 1.0 1.0  --         Recent Labs   Lab 03/20/24  0529 03/21/24  1147 03/21/24  1543 03/21/24  1944   POCTGLUCOSE 128* 115* 131* 112*       Scheduled Meds:   albuterol-ipratropium  3 mL Nebulization Q6H    amitriptyline  25 mg Oral Daily    amLODIPine  5 mg Oral Daily    atorvastatin  80 mg Oral Daily    fluticasone furoate-vilanteroL  1 puff Inhalation Daily    fluticasone propionate  1 spray Each Nostril Daily    hydroCHLOROthiazide  25 mg Oral Daily    losartan  100 mg Oral Daily    pantoprazole  40 mg Oral Daily    roflumilast  1 tablet Oral Daily    tiotropium bromide  2 puff Inhalation Daily     Continuous  Infusions:  As Needed:  acetaminophen, albuterol, benzonatate, dextrose 10%, dextrose 10%, glucagon (human recombinant), glucose, glucose, insulin aspart U-100, ipratropium, sodium chloride 0.9%    Assessment and Plan  / Problems managed today    * Hemoptysis  Hilar Mass  History of smoking 30 or more pack years  Centrilobular emphysema  New onset hemoptysis today. No other sources of bleeding.   CTA concerning with L hilar mass and likely vascular involvement  NPO for pulmonary assessment in AM. Likely bronch and biopsy.  Vitals stable, H/H stable. If patient has significant hemoptysis, would need MICU evaluation.  Hold anticoagulation. Type and screen active.    SHOLA (obstructive sleep apnea)  Continue home CPAP nightly    HTN (hypertension), benign  Chronic, controlled. Continue home losartan, norvasc, and hctz    Centrilobular emphysema  Patient's COPD is controlled currently.  Patient is currently off COPD Pathway. Continue scheduled inhalers and monitor respiratory status closely.     Discharge Planning   MEI: 3/22/2024     Code Status: Full Code   Is the patient medically ready for discharge?:     Reason for patient still in hospital (select all that apply): Patient trending condition and Treatment  Discharge Plan A: Home with family        Diet:  regular diet  GI PPx: protonix  DVT PPx:  AC on hold  Airways: room air  Wounds: none    Goals of Care:  Return to prior functional status    Frances Gasca MD

## 2024-03-23 LAB
BACTERIA SPEC AEROBE CULT: NORMAL
GRAM STN SPEC: NORMAL

## 2024-03-23 NOTE — PLAN OF CARE
[facility] Med S[facility] ([facility])  Discharge Final Note    Primary Care Provider: Sierra Landry MD    Expected Discharge Date: 3/22/2024    Final Discharge Note (most recent)       Final Note - 03/23/24 0910          Final Note    Assessment Type Final Discharge Note (P)      Anticipated Discharge Disposition Home or Self Care (P)      What phone number can be called within the next 1-3 days to see how you are doing after discharge? [phone] (P)         Post-Acute Status    Post-Acute Authorization HME (P)    home oxygen    HME Status Set-up Complete/Auth obtained (P)      Coverage MEDICARE - MEDICARE PART A & B - (P)                      Important Message from Medicare  Important Message from Medicare regarding Discharge Appeal Rights: Given to patient/caregiver, Explained to patient/caregiver, Signed/date by patient/caregiver     Date IMM was signed: 03/22/24  Time IMM was signed: 0931    Contact Info       [facility] - Hem Onc 2nd Fl   Specialty: Hematology and Oncology    [address] [place] [id number]   Phone: [phone]       Next Steps: Follow up in 1 week(s)    Instructions: biopsy follow up          Patient dc w/ HME (home oxygen).                  Jossue Tello MSW, LMSW  [facility]  Case Management  Ext. [phone]

## 2024-03-24 ENCOUNTER — TELEPHONE (OUTPATIENT)
Dept: INTERNAL MEDICINE | Facility: CLINIC | Age: 79
End: 2024-03-24
Payer: MEDICARE

## 2024-03-25 ENCOUNTER — TELEPHONE (OUTPATIENT)
Dept: INTERNAL MEDICINE | Facility: CLINIC | Age: 79
End: 2024-03-25
Payer: MEDICARE

## 2024-03-25 NOTE — TELEPHONE ENCOUNTER
Do I/we have any hospital follow up availability within 7 (preferably)-to-14 days?     I spoke to pt and scheduled a hospital appt 4-4-24 at 11:30 am.  He verbalized understanding

## 2024-03-26 ENCOUNTER — TELEPHONE (OUTPATIENT)
Dept: INTERNAL MEDICINE | Facility: CLINIC | Age: 79
End: 2024-03-26
Payer: MEDICARE

## 2024-03-26 NOTE — TELEPHONE ENCOUNTER
----- Message from Cosmo Campbell sent at 3/25/2024 11:24 AM CDT -----  Contact: [phone]  [email or web address]     Patient is calling for Medical Advice regarding: Questions     How long has patient had these symptoms:    Pharmacy name and phone#:    Would like response via [email or web address]:  call back     Comments:

## 2024-03-26 NOTE — DISCHARGE SUMMARY
Discharge Summary  Hospital Medicine    Attending Provider on Discharge: Frances Gasca MD  [facility] Medicine Team: [facility] MED Q  Date of Admission:  3/19/2024     Date of Discharge:  3/22/2024  Code status: Full Code    Active Hospital Problems    Diagnosis  POA    *Hemoptysis [R04.2]  Yes     Priority: 1 - High    Hilar mass [R91.8]  Yes    Chronic obstructive pulmonary disease [J44.9]  Yes     72 yo male, former  comes for his periodic pulmonary visit. He saw Dr. Estrada in April and has done well, with no COPD exacerbations. He recently moved to [place] to be closer to his daughter. He uses symbicort and spiriva and his maintenance medications. Peak flow 270 l/min  In April his Fev-1: 1.33 liters 47%. He is an avid golfer and plays 3-4 times a week weather permitting. He has a single cardiac stent.  Proximal LAD was occluded and was opened and a stent placed, 10/31.13  No further symptoms.      History of smoking 30 or more pack years [Z87.891]  Not Applicable    HTN (hypertension), benign [I10]  Yes    SHOLA (obstructive sleep apnea) [G47.33]  Yes    Centrilobular emphysema [J43.2]  Yes     Chronic      Resolved Hospital Problems   No resolved problems to display.     HPI  Mr.William Tiffany Erwin is a 78-year-old man with PMHx significant for centrilobular emphysema/COPD with secondary chronic hypoxic respiratory failure (following with pulmonology Dr. Ward), coronary artery disease, aortic atherosclerosis, hyperlipidemia (atorvastatin 80), hypertension (hydrochlorothiazide 25 and losartan 100), distant but significant smoking history (quit 12 years ago), GERD (omeprazole 20), and obstructive sleep apnea (using home CPAP). Patient presented to the ED with new onset hemoptysis that started earlier today. Blood with clots visible in emesis bag. H/H and vitals stable. He has never had hemoptysis before. No nausea/vomiting. No epistaxis, hematuria, or bloody stools. Patient saw Dr. Ba for regular  follow up in cardiology clinic 3/18 due to worsening MONTIEL. TTE 12/2023 unremarkable.    CTA chest concerning with new L hilar mass and probable vascular involvement.     CTA Chest 3/19/24  Lungs/Pleura: There is a 3.4 x 5.8 cm left hilar mass suggesting neoplasm.  The mass surrounds the left upper lobe pulmonary artery with probable invasion of the distal left main bronchus and left upper lobe bronchi.  Probable involvement of the superior left pulmonary vein also.  Few adjacent subcentimeter left upper lobe nodular foci.  Impression:  1. No evidence of pulmonary embolism  2. Left hilar mass suggesting neoplasm with probable vascular involvement of the left superior pulmonary vein and invasion of the distal left main bronchus and proximal segmental bronchi in the left upper lobe.  Few probable adjacent subcentimeter satellite lesions in the left upper lobe.  See above comments.  Follow-up recommended.  3. Emphysematous changes of the lungs.    Admitted to hospital medicine with stable vitals and on room air. Discussed CT findings with Mr Allen and what some possible next steps could be.    Hospital Course  * Hemoptysis  Hilar Mass  History of smoking 30 or more pack years  Centrilobular emphysema  New onset hemoptysis today. No other sources of bleeding.   CTA concerning with L hilar mass and likely vascular involvement  Bronchoscopy 3/20/2023 found Left upper lobe lung mass suspicious for cancer. Biopsy taken and sent for cytology  Vitals stable, H/H stable. If patient has significant hemoptysis, would need MICU evaluation.  Hold anticoagulation. Type and screen active.  Referral to oncology will be outpatient for evaluation and management  Patient had not further episodes from hemoptysis during stay. His H/H stable and pulmonary felt safe for patient to be discharged.    SHOLA (obstructive sleep apnea)  Continue home CPAP nightly    HTN (hypertension), benign  Chronic, controlled. Continue home losartan, norvasc,  and hctz    Centrilobular emphysema  Patient's COPD is controlled currently.  Patient is currently off COPD Pathway. Continue scheduled inhalers and monitor respiratory status closely. Wheezy throughout stay but mass thought to be contributory. Pulmonary agreeable to try prednisone burst for 5 days.     Procedures: bronchoscopy as above    Consultants: pulmonary    Discharge Medication List as of 3/22/2024  5:30 PM        START taking these medications    Details   !! predniSONE (DELTASONE) 20 MG tablet Take 2 tablets (40 mg total) by mouth once daily. for 5 days, Starting Fri 3/22/2024, Until Wed 3/27/2024, Normal       !! - Potential duplicate medications found. Please discuss with provider.        CONTINUE these medications which have CHANGED    Details   benzonatate (TESSALON) 100 MG capsule Take 1 capsule (100 mg total) by mouth 3 (three) times daily as needed for Cough., Starting Fri 3/22/2024, Until Mon 4/1/2024 at 2359, Normal      fluticasone propionate (FLONASE) 50 mcg/actuation nasal spray 2 sprays (100 mcg total) by Each Nostril route once daily., Starting Fri 3/22/2024, Normal           CONTINUE these medications which have NOT CHANGED    Details   albuterol (ACCUNEB) 0.63 mg/3 mL Nebu Take 3 mLs (0.63 mg total) by nebulization every 6 (six) hours as needed (if symptoms failed to improve with inhaler). Rescue, Starting Thu 2/8/2024, Normal      albuterol (PROVENTIL/VENTOLIN HFA) 90 mcg/actuation inhaler Inhale 2 puffs into the lungs every 4 (four) hours as needed for Wheezing., Starting Thu 2/8/2024, Normal      amitriptyline (ELAVIL) 25 MG tablet Take 1 tablet (25 mg total) by mouth once daily., Starting Thu 3/7/2024, Normal      amLODIPine (NORVASC) 5 MG tablet Take 1 tablet (5 mg total) by mouth once daily., Starting Mon 3/18/2024, Until Tue 3/18/2025, Normal      aspirin (ECOTRIN) 81 MG EC tablet Take 81 mg by mouth once daily. , Historical Med      atorvastatin (LIPITOR) 80 MG tablet TAKE 1 TABLET  (80 MG TOTAL) BY MOUTH ONCE DAILY., Normal      BETA-CAROTENE,A, W-C & E/MIN (OCUVITE ORAL) Take 1 capsule by mouth once daily. , Historical Med      budesonide-glycopyr-formoterol (BREZTRI AEROSPHERE) 160-9-4.8 mcg/actuation HFAA Inhale 2 puffs into the lungs 2 (two) times a day., Starting Thu 2/8/2024, Normal      echinacea 400 mg Cap Take 1 capsule by mouth once daily. , Historical Med      hydroCHLOROthiazide (HYDRODIURIL) 25 MG tablet Take 1 tablet (25 mg total) by mouth once daily., Starting Mon 3/18/2024, Until Thu 3/13/2025, Normal      ketoconazole (NIZORAL) 2 % cream Apply topically once daily., Starting Mon 11/6/2023, Normal      latanoprost 0.005 % ophthalmic solution Place 1 drop into both eyes once daily. , Starting Tue 10/6/2015, Historical Med      losartan (COZAAR) 100 MG tablet Take 1 tablet (100 mg total) by mouth once daily., Starting Mon 3/18/2024, Normal      nitroGLYCERIN (NITROSTAT) 0.4 MG SL tablet Place 1 tablet (0.4 mg total) under the tongue every 5 (five) minutes as needed., Starting Wed 2/9/2022, Normal      omeprazole (PRILOSEC) 20 MG capsule Take 1 capsule (20 mg total) by mouth once daily., Starting Mon 12/4/2023, Normal      !! predniSONE (DELTASONE) 5 MG tablet TAKE 1 TABLET BY MOUTH EVERY OTHER DAY. TAKE WITH FOOD, Normal      roflumilast (DALIRESP) 500 mcg Tab Take 1 tablet (500 mcg total) by mouth once daily., Starting Thu 2/8/2024, Normal      urea (CARMOL) 40 % Crea Apply topically once daily., Starting Thu 8/31/2023, Normal       !! - Potential duplicate medications found. Please discuss with provider.        STOP taking these medications       nebulizers Misc Comments:   Reason for Stopping:               Discharge Diet:regular diet     Activity: activity as tolerated    Discharge Condition: Good    Disposition: Home or Self Care    Tests pending at the time of discharge: none      Time spent  on the discharge of the patient including review of hospital course with the  patient. reviewing discharge medications and arranging follow-up care 35 min    Discharge examination Patient was seen and examined on the date of discharge and determined to be suitable for discharge.    Discharge plan     Future Appointments   Date Time Provider Department Center   4/1/2024  8:00 AM Florence Veloz MD [facility] PCW   4/2/2024 10:20 AM Bienvenido Ward MD [facility] PULMON [place]   4/4/2024 11:30 AM Sierra Landry MD [facility] [place]   4/30/2024  7:30 AM CARDIAC, PET IMAGING [facility] CARDPET [facility]   5/6/2024 10:30 AM Garrett Lloyd DPM [facility] PODIA [place]   6/6/2024  4:30 PM Prince Ba MD [facility] CARDIO [facility]   6/10/2024 10:00 AM [facility]  CT1 [facility] CTSCAN [place]       Frances Gasca MD

## 2024-03-27 ENCOUNTER — NURSE TRIAGE (OUTPATIENT)
Dept: ADMINISTRATIVE | Facility: CLINIC | Age: 79
End: 2024-03-27
Payer: MEDICARE

## 2024-03-27 ENCOUNTER — OFFICE VISIT (OUTPATIENT)
Dept: INTERNAL MEDICINE | Facility: CLINIC | Age: 79
End: 2024-03-27
Payer: MEDICARE

## 2024-03-27 ENCOUNTER — LAB VISIT (OUTPATIENT)
Dept: LAB | Facility: HOSPITAL | Age: 79
End: 2024-03-27
Payer: MEDICARE

## 2024-03-27 ENCOUNTER — PATIENT OUTREACH (OUTPATIENT)
Dept: ADMINISTRATIVE | Facility: CLINIC | Age: 79
End: 2024-03-27
Payer: MEDICARE

## 2024-03-27 VITALS
DIASTOLIC BLOOD PRESSURE: 60 MMHG | HEART RATE: 98 BPM | OXYGEN SATURATION: 95 % | WEIGHT: 220 LBS | RESPIRATION RATE: 18 BRPM | SYSTOLIC BLOOD PRESSURE: 110 MMHG | BODY MASS INDEX: 33.45 KG/M2

## 2024-03-27 DIAGNOSIS — E87.6 HYPOKALEMIA: Primary | ICD-10-CM

## 2024-03-27 DIAGNOSIS — R17 ELEVATED BILIRUBIN: ICD-10-CM

## 2024-03-27 DIAGNOSIS — N17.9 AKI (ACUTE KIDNEY INJURY): ICD-10-CM

## 2024-03-27 DIAGNOSIS — J43.9 PULMONARY EMPHYSEMA, UNSPECIFIED EMPHYSEMA TYPE: ICD-10-CM

## 2024-03-27 DIAGNOSIS — Z09 HOSPITAL DISCHARGE FOLLOW-UP: ICD-10-CM

## 2024-03-27 DIAGNOSIS — E87.6 HYPOKALEMIA: ICD-10-CM

## 2024-03-27 DIAGNOSIS — C34.92 ADENOCARCINOMA OF LEFT LUNG: ICD-10-CM

## 2024-03-27 LAB
ALBUMIN SERPL BCP-MCNC: 3.8 G/DL (ref 3.5–5.2)
ALP SERPL-CCNC: 113 U/L (ref 55–135)
ALT SERPL W/O P-5'-P-CCNC: 41 U/L (ref 10–44)
ANION GAP SERPL CALC-SCNC: 12 MMOL/L (ref 8–16)
AST SERPL-CCNC: 22 U/L (ref 10–40)
BILIRUB SERPL-MCNC: 0.8 MG/DL (ref 0.1–1)
BUN SERPL-MCNC: 36 MG/DL (ref 8–23)
CALCIUM SERPL-MCNC: 10 MG/DL (ref 8.7–10.5)
CHLORIDE SERPL-SCNC: 103 MMOL/L (ref 95–110)
CO2 SERPL-SCNC: 27 MMOL/L (ref 23–29)
CREAT SERPL-MCNC: 1.2 MG/DL (ref 0.5–1.4)
EST. GFR  (NO RACE VARIABLE): >60 ML/MIN/1.73 M^2
FINAL PATHOLOGIC DIAGNOSIS: ABNORMAL
GLUCOSE SERPL-MCNC: 147 MG/DL (ref 70–110)
Lab: ABNORMAL
MICROSCOPIC EXAM: ABNORMAL
POTASSIUM SERPL-SCNC: 3.2 MMOL/L (ref 3.5–5.1)
PROT SERPL-MCNC: 7 G/DL (ref 6–8.4)
SODIUM SERPL-SCNC: 142 MMOL/L (ref 136–145)

## 2024-03-27 PROCEDURE — 80053 COMPREHEN METABOLIC PANEL: CPT

## 2024-03-27 PROCEDURE — 99213 OFFICE O/P EST LOW 20 MIN: CPT | Mod: S$PBB,GC,,

## 2024-03-27 PROCEDURE — 99999 PR PBB SHADOW E&M-EST. PATIENT-LVL III: CPT | Mod: PBBFAC,GC,,

## 2024-03-27 PROCEDURE — 36415 COLL VENOUS BLD VENIPUNCTURE: CPT

## 2024-03-27 PROCEDURE — 99213 OFFICE O/P EST LOW 20 MIN: CPT | Mod: PBBFAC

## 2024-03-27 NOTE — TELEPHONE ENCOUNTER
Pt requesting to speak with nurse Cornelia cerna.   Reason for Disposition   Caller has already spoken with another triager and has no further questions.    Protocols used: No Contact or Duplicate Contact Call-A-AH

## 2024-03-27 NOTE — PROGRESS NOTES
C3 nurse spoke with Jordin Allen Jr.  for a TCC post hospital discharge follow up call. The patient has a scheduled HOSFU appointment withFlorence Veloz MD on 4/1/2024 at 0800.

## 2024-03-27 NOTE — TELEPHONE ENCOUNTER
Previous discharge, sent home without O2, started coughing up blood, most recent discharge qualified for O2. Has [facility]. Has noticed with exertion he has labored breathing and requires a nebulizer without medication. Sat drops to 80's but recovers to 90's. Oxygen has been on 2liters via [place]. Current sat 95% pulse 87. Care advice to be seen today, appointment scheduled. Pt verbalized understanding.     Addendum: Pt calling back does not have transportation to appointment and concerned about portable oxygen tank. Appointment time changed, pt will attempt to make new time.   Reason for Disposition   MILD difficulty breathing (e.g., minimal/no SOB at rest, SOB with walking) and worse than normal    Additional Information   Negative: SEVERE difficulty breathing (e.g., struggling for each breath, speaks in single words, pulse > 120)   Negative: Bluish (or gray) lips or face now   Negative: Difficult to awaken or acting confused (e.g., disoriented, slurred speech)   Negative: Slow, shallow and weak breathing   Negative: Sounds like a life-threatening emergency to the triager   Negative: MODERATE difficulty breathing (e.g., speaks in phrases, SOB even at rest, pulse 100 - 120) and new-onset or worse than normal   Negative: MODERATE difficulty breathing and oxygen level (e.g., pulse oximetry) 91 to 94%   Negative: Oxygen level (e.g., pulse oximetry) 90% or lower   Negative: Patient sounds very sick or weak to the triager   Negative: MODERATE difficulty breathing (e.g., speaks in phrases, SOB even at rest, pulse 100 - 120) and NOT new-onset nor worse than normal   Negative: Drinking very little and dehydration suspected (e.g., no urine > 12 hours, very dry mouth, very lightheaded)   Negative: MILD difficulty breathing (e.g., minimal/no SOB at rest, SOB with walking, pulse < 100) and new-onset   Negative: Fever > 100.4 F (38.0 C)   Negative: Fall in oxygen level 4% or more (below known patient baseline, while awake  and resting) and new or worse difficulty breathing   Negative: Oxygen level (e.g., pulse oximetry) 91 to 94%   Negative: Pulse (heart rate) > 120 beats per minute   Negative: Caller has URGENT question and triager unable to answer question   Negative: Nurse judgment    Protocols used: Oxygen Monitoring and [id number]-[id number]

## 2024-03-27 NOTE — TELEPHONE ENCOUNTER
Pt call transferred to Cornelia Smith RN  Reason for Disposition   Follow-up information-only call to recent contact, no triage required    Protocols used: Information Only Call - No Triage-A-OH

## 2024-03-27 NOTE — PROGRESS NOTES
Subjective     Chief Complaint: shortness of breath    History of Present Illness:  Mr. Jordin Allen Jr. is a 78 y.o. male with HTN, HLD, CAD, GERD, SHOLA, COPD (on home O2 2 L), and history of tobacco abuse who presents to clinic for increasing shortness of breath.     Patient was recently discharged from the hospital on 3/22/24 with new onset hemoptysis and found to have a new L hilar mass on CTA Chest. Patient underwent biopsy of mass with pulmonology with path still pending. Hemoptysis resolved and patient was discharged with instructions to follow up with oncology for further management.     Patient states that he had some shortness of breath that occurred yesterday for which he was concerned. He states that he was doing well but not exercising too much since discharge. He was anxious also about his new diagnosis and being on O2. He had some trouble with his portable concentrator as well. He denies any fevers, chills, URI symptoms, hemoptysis, headaches, pain.     Review of Systems   Constitutional:  Negative for chills and fever.   HENT:  Negative for ear pain, sinus pain and sore throat.    Eyes:  Negative for pain.   Respiratory:  Positive for cough, sputum production and shortness of breath. Negative for hemoptysis.    Cardiovascular:  Negative for chest pain and leg swelling.   Gastrointestinal:  Negative for abdominal pain and heartburn.   Genitourinary:  Negative for dysuria and flank pain.   Musculoskeletal:  Negative for back pain, joint pain, myalgias and neck pain.   Neurological:  Negative for headaches.   Psychiatric/Behavioral:  Negative for depression. The patient is not nervous/anxious and does not have insomnia.        PAST HISTORY:     Past Medical History:   Diagnosis Date    Benign neoplasm of colon 10/01/2013    Bronchitis chronic     CAD (coronary artery disease) 10/31/2013    COPD (chronic obstructive pulmonary disease)     Emphysema of lung     GERD (gastroesophageal reflux  disease)     Hx of colonic polyps     Hypertension     NAFLD (nonalcoholic fatty liver disease) 2017    SHOLA on CPAP     RLS (restless legs syndrome)     Screen for colon cancer 2013       Past Surgical History:   Procedure Laterality Date    bilateral foot surgery      CARDIAC CATHETERIZATION  2013    x1    CATARACT EXTRACTION, BILATERAL      COLON SURGERY      Ace Mott MD    COLONOSCOPY N/A 3/21/2018    Procedure: COLONOSCOPY;  Surgeon: Terry Mott MD;  Location: [facility] ENDO (4TH FLR);  Service: Endoscopy;  Laterality: N/A;    COLONOSCOPY N/A 2019    Procedure: COLONOSCOPY;  Surgeon: John Mantilla MD;  Location: [facility] ENDO (4TH FLR);  Service: Endoscopy;  Laterality: N/A;    COLONOSCOPY N/A 2022    Procedure: COLONOSCOPY;  Surgeon: MEDINA Farris MD;  Location: [facility] ENDO ([facility] FLR);  Service: Endoscopy;  Laterality: N/A;  fully vacc-inst portal-tb    scrotal abscess removal         Family History   Problem Relation Age of Onset    Heart disease Mother     Heart attack Mother     Hypertension Mother     No Known Problems Sister     No Known Problems Daughter     Asthma Neg Hx     Emphysema Neg Hx     Prostate cancer Neg Hx     Cirrhosis Neg Hx        Social History     Socioeconomic History    Marital status:    Tobacco Use    Smoking status: Former     Current packs/day: 0.00     Average packs/day: 1 pack/day for 40.0 years (40.0 ttl pk-yrs)     Types: Cigarettes     Start date: 8/15/1972     Quit date: 8/15/2012     Years since quittin.6     Passive exposure: Never    Smokeless tobacco: Never   Substance and Sexual Activity    Alcohol use: Yes     Alcohol/week: 3.0 standard drinks of alcohol     Types: 3 Cans of beer per week     Comment: maybe 2-3  beers weekly    Drug use: No    Sexual activity: Yes     Partners: Female   Social History Narrative    Retired .  .       Social Determinants of Health     Financial Resource Strain: Low Risk   (3/21/2024)    Overall Financial Resource Strain (CARDIA)     Difficulty of Paying Living Expenses: Not very hard   Food Insecurity: No Food Insecurity (3/21/2024)    Hunger Vital Sign     Worried About Running Out of Food in the Last Year: Never true     Ran Out of Food in the Last Year: Never true   Transportation Needs: No Transportation Needs (3/21/2024)    PRAPARE - Transportation     Lack of Transportation (Medical): No     Lack of Transportation (Non-Medical): No   Physical Activity: Inactive (3/21/2024)    Exercise Vital Sign     Days of Exercise per Week: 0 days     Minutes of Exercise per Session: 0 min   Stress: No Stress Concern Present (3/21/2024)    [employer] [place] [employer] - Occupational Stress Questionnaire     Feeling of Stress : Only a little   Social Connections: Moderately Integrated (3/21/2024)    Social Connection and Isolation Panel [NHANES]     Frequency of Communication with Friends and Family: More than three times a week     Frequency of Social Gatherings with Friends and Family: More than three times a week     Attends Mandaeism Services: More than 4 times per year     Active Member of Clubs or Organizations: Yes     Attends Club or Organization Meetings: More than 4 times per year     Marital Status:    Housing Stability: Low Risk  (3/21/2024)    Housing Stability Vital Sign     Unable to Pay for Housing in the Last Year: No     Number of Places Lived in the Last Year: 1     Unstable Housing in the Last Year: No       MEDICATIONS & ALLERGIES:     Current Outpatient Medications on File Prior to Visit   Medication Sig    albuterol (ACCUNEB) 0.63 mg/3 mL Nebu Take 3 mLs (0.63 mg total) by nebulization every 6 (six) hours as needed (if symptoms failed to improve with inhaler). Rescue    albuterol (PROVENTIL/VENTOLIN HFA) 90 mcg/actuation inhaler Inhale 2 puffs into the lungs every 4 (four) hours as needed for Wheezing. (Patient not taking: Reported on 3/27/2024)     amitriptyline (ELAVIL) 25 MG tablet Take 1 tablet (25 mg total) by mouth once daily.    amLODIPine (NORVASC) 5 MG tablet Take 1 tablet (5 mg total) by mouth once daily.    aspirin (ECOTRIN) 81 MG EC tablet Take 81 mg by mouth once daily.     atorvastatin (LIPITOR) 80 MG tablet TAKE 1 TABLET (80 MG TOTAL) BY MOUTH ONCE DAILY.    benzonatate (TESSALON) 100 MG capsule Take 1 capsule (100 mg total) by mouth 3 (three) times daily as needed for Cough.    BETA-CAROTENE,A, W-C & E/MIN (OCUVITE ORAL) Take 1 capsule by mouth once daily.     budesonide-glycopyr-formoterol (BREZTRI AEROSPHERE) 160-9-4.8 mcg/actuation HFAA Inhale 2 puffs into the lungs 2 (two) times a day.    echinacea 400 mg Cap Take 1 capsule by mouth once daily.     fluticasone propionate (FLONASE) 50 mcg/actuation nasal spray 2 sprays (100 mcg total) by Each Nostril route once daily.    hydroCHLOROthiazide (HYDRODIURIL) 25 MG tablet Take 1 tablet (25 mg total) by mouth once daily.    ketoconazole (NIZORAL) 2 % cream Apply topically once daily. (Patient not taking: Reported on 3/27/2024)    latanoprost 0.005 % ophthalmic solution Place 1 drop into both eyes once daily.     losartan (COZAAR) 100 MG tablet Take 1 tablet (100 mg total) by mouth once daily.    nitroGLYCERIN (NITROSTAT) 0.4 MG SL tablet Place 1 tablet (0.4 mg total) under the tongue every 5 (five) minutes as needed. (Patient not taking: Reported on 3/27/2024)    omeprazole (PRILOSEC) 20 MG capsule Take 1 capsule (20 mg total) by mouth once daily.    predniSONE (DELTASONE) 20 MG tablet Take 2 tablets (40 mg total) by mouth once daily. for 5 days    predniSONE (DELTASONE) 5 MG tablet TAKE 1 TABLET BY MOUTH EVERY OTHER DAY. TAKE WITH FOOD    roflumilast (DALIRESP) 500 mcg Tab Take 1 tablet (500 mcg total) by mouth once daily.    urea (CARMOL) 40 % Crea Apply topically once daily. (Patient not taking: Reported on 3/27/2024)     No current facility-administered medications on file prior to visit.        Review of patient's allergies indicates:   Allergen Reactions    Brilinta [ticagrelor] Itching    Lisinopril      Other reaction(s): cough    Metoprolol succinate Rash       OBJECTIVE:     Vital Signs:  Vitals:    03/27/24 1612   BP: 110/60   BP Location: Right arm   Pulse: 98   Resp: 18   SpO2: 95%   Weight: 99.8 kg (220 lb 0.3 oz)       Body mass index is 33.45 kg/m².     Physical Exam  Vitals and nursing note reviewed.   Constitutional:       General: He is not in acute distress.     Appearance: Normal appearance. He is obese. He is not ill-appearing or diaphoretic.   HENT:      Head: Normocephalic and atraumatic.      Right Ear: External ear normal.      Left Ear: External ear normal.      Nose: Nose normal.   Eyes:      General: No scleral icterus.     Extraocular Movements: Extraocular movements intact.      Conjunctiva/sclera: Conjunctivae normal.   Cardiovascular:      Rate and Rhythm: Normal rate and regular rhythm.      Pulses: Normal pulses.      Heart sounds: Normal heart sounds. No murmur heard.  Pulmonary:      Effort: Pulmonary effort is normal. No respiratory distress.      Breath sounds: Normal breath sounds. No stridor. No wheezing, rhonchi or rales.      Comments: Decrease breath sounds bilaterally  On 2 L satting 95%  Abdominal:      General: Abdomen is flat. There is no distension.      Palpations: Abdomen is soft.   Musculoskeletal:         General: No swelling. Normal range of motion.      Cervical back: Normal range of motion. No rigidity.      Right lower leg: No edema.      Left lower leg: No edema.   Skin:     General: Skin is warm and dry.      Coloration: Skin is not jaundiced or pale.   Neurological:      General: No focal deficit present.      Mental Status: He is alert and oriented to person, place, and time.      Motor: No weakness.   Psychiatric:         Mood and Affect: Mood normal.         Behavior: Behavior normal.         Thought Content: Thought content normal.          Laboratory  Lab Results   Component Value Date    WBC 12.86 (H) 03/22/2024    HGB 12.9 (L) 03/22/2024    HCT 40.0 03/22/2024    MCV 81 (L) 03/22/2024     03/22/2024     Lab Results   Component Value Date    INR 1.0 03/20/2024    INR 1.0 03/19/2024    INR 1.0 03/27/2017     Lab Results   Component Value Date    HGBA1C 5.7 (H) 03/06/2024         Diagnostic Results:  Labs: Reviewed  X-Ray: Reviewed  CT: Reviewed    ASSESSMENT & PLAN:   Mr. Jordin Allen Jr. is a 78 y.o. male who presents today with   Diagnoses and all orders for this visit:    Hypokalemia  Patient noted to be hypokalemic on discharge labs. Will follow up with CMP.   -     COMPREHENSIVE METABOLIC PANEL; Future    ZACH (acute kidney injury)  -     COMPREHENSIVE METABOLIC PANEL; Future    Elevated bilirubin  -     COMPREHENSIVE METABOLIC PANEL; Future    Hospital discharge follow-up    Adenocarcinoma of left lung  Gave number for  to make appointment with oncology.     COPD  Patient on 2L supplemental O2. Discussed and advised how to change batteries and troubleshooting concentrator. Patient will follow up with Dr. Owens for further management. Currently managing well with current inhaler therapy.             RTC in prn    Case discussed with Dr Baron Dennis MD  Internal Medicine PGY3  [facility]  [address] [place] [address] [phone]

## 2024-03-28 ENCOUNTER — TELEPHONE (OUTPATIENT)
Dept: INTERNAL MEDICINE | Facility: CLINIC | Age: 79
End: 2024-03-28
Payer: MEDICARE

## 2024-03-28 DIAGNOSIS — C34.12 ADENOCARCINOMA OF UPPER LOBE OF LEFT LUNG: Primary | ICD-10-CM

## 2024-03-28 DIAGNOSIS — C80.1 ADENOCARCINOMA: Primary | ICD-10-CM

## 2024-03-28 RX ORDER — POTASSIUM CHLORIDE 750 MG/1
10 TABLET, EXTENDED RELEASE ORAL 2 TIMES DAILY
Qty: 10 TABLET | Refills: 0 | Status: SHIPPED | OUTPATIENT
Start: 2024-03-28 | End: 2024-04-02

## 2024-03-28 NOTE — TELEPHONE ENCOUNTER
K low on CMP. Advised to take short course of supplements. Patient has not heard back about scheduling an appointment with oncology given path results of adenocarcinoma of the lung. Advised to call [facility] Scheduling number and will place another referral in case.

## 2024-03-31 ENCOUNTER — EXTERNAL CHRONIC CARE MANAGEMENT (OUTPATIENT)
Dept: PRIMARY CARE CLINIC | Facility: CLINIC | Age: 79
End: 2024-03-31
Payer: MEDICARE

## 2024-03-31 PROCEDURE — 99439 CHRNC CARE MGMT STAF EA ADDL: CPT | Mod: S$PBB,,, | Performed by: INTERNAL MEDICINE

## 2024-03-31 PROCEDURE — 99490 CHRNC CARE MGMT STAFF 1ST 20: CPT | Mod: S$PBB,,, | Performed by: INTERNAL MEDICINE

## 2024-03-31 PROCEDURE — 99439 CHRNC CARE MGMT STAF EA ADDL: CPT | Mod: PBBFAC,PO | Performed by: INTERNAL MEDICINE

## 2024-03-31 PROCEDURE — 99490 CHRNC CARE MGMT STAFF 1ST 20: CPT | Mod: PBBFAC,PO | Performed by: INTERNAL MEDICINE

## 2024-04-02 ENCOUNTER — OFFICE VISIT (OUTPATIENT)
Dept: PULMONOLOGY | Facility: CLINIC | Age: 79
End: 2024-04-02
Payer: MEDICARE

## 2024-04-02 ENCOUNTER — TELEPHONE (OUTPATIENT)
Dept: HEMATOLOGY/ONCOLOGY | Facility: CLINIC | Age: 79
End: 2024-04-02
Payer: MEDICARE

## 2024-04-02 VITALS
WEIGHT: 230.38 LBS | BODY MASS INDEX: 34.92 KG/M2 | HEIGHT: 68 IN | HEART RATE: 85 BPM | OXYGEN SATURATION: 98 % | SYSTOLIC BLOOD PRESSURE: 119 MMHG | DIASTOLIC BLOOD PRESSURE: 79 MMHG

## 2024-04-02 DIAGNOSIS — C34.90 MALIGNANT NEOPLASM OF UNSPECIFIED PART OF UNSPECIFIED BRONCHUS OR LUNG: Primary | ICD-10-CM

## 2024-04-02 DIAGNOSIS — C34.92 NSCLC OF LEFT LUNG: Primary | ICD-10-CM

## 2024-04-02 DIAGNOSIS — J44.9 CHRONIC OBSTRUCTIVE PULMONARY DISEASE, UNSPECIFIED COPD TYPE: ICD-10-CM

## 2024-04-02 DIAGNOSIS — I25.10 CORONARY ARTERY DISEASE INVOLVING NATIVE CORONARY ARTERY OF NATIVE HEART WITHOUT ANGINA PECTORIS: Chronic | ICD-10-CM

## 2024-04-02 DIAGNOSIS — J96.11 CHRONIC RESPIRATORY FAILURE WITH HYPOXIA: ICD-10-CM

## 2024-04-02 PROCEDURE — G2211 COMPLEX E/M VISIT ADD ON: HCPCS | Mod: S$PBB,,, | Performed by: INTERNAL MEDICINE

## 2024-04-02 PROCEDURE — 99214 OFFICE O/P EST MOD 30 MIN: CPT | Mod: PBBFAC | Performed by: INTERNAL MEDICINE

## 2024-04-02 PROCEDURE — 99214 OFFICE O/P EST MOD 30 MIN: CPT | Mod: S$PBB,,, | Performed by: INTERNAL MEDICINE

## 2024-04-02 PROCEDURE — 99999 PR PBB SHADOW E&M-EST. PATIENT-LVL IV: CPT | Mod: PBBFAC,,, | Performed by: INTERNAL MEDICINE

## 2024-04-02 RX ORDER — BENZONATATE 200 MG/1
200 CAPSULE ORAL 3 TIMES DAILY PRN
Qty: 90 CAPSULE | Refills: 3 | Status: SHIPPED | OUTPATIENT
Start: 2024-04-02 | End: 2024-07-31

## 2024-04-02 NOTE — ASSESSMENT & PLAN NOTE
Significant chronic condition to be followed longitudinally with following long term treatment plan.     Known COPD - recent spirometry with severe COPD. On breztri. Not in exacerbation.  - continue triple therapy   - Continue Rolfumilast

## 2024-04-02 NOTE — NURSING
Patient notified that appointment scheduled with Dr. Sanchez is canceled.  Patient scheduled with a Lung Cancer provider, Dr. Nuñez for 04/12/24.  Patient scheduled for MRI brain for 04/03/24.  Provided patient with my direct contact information.    Oncology Navigation   Intake  Date of Diagnosis: 03/28/24  Cancer Type: Thoracic  Type of Referral: Internal  Date of Referral: 04/02/24  Initial Nurse Navigator Contact: 04/02/24  Referral to Initial Contact Timeline (days): 0  Date Worked: 04/02/24  First Appointment Available: 04/12/24  Appointment Date: 04/12/24  First Available Date vs. Scheduled Date (days): 0     Treatment  Current Status: Staging work-up       Medical Oncologist: [place]  Consult Date: 04/12/24       Procedures: PET scan; MRI  MRI Schedule Date: 04/03/24  PET Scan Schedule Date: 04/11/24             Support Systems: Children  Barriers of Care: Transportation     Acuity  Stage: 2  Treatment Tolerability: Has not started treatment yet/treatment fully completed and side effects resolved  Hospitalization Within the Past Month: 1   Needed: 0  Support: 0  Verbalizes Financial Concerns: 0  Transportation: 1  History of noncompliance/frequent no shows and cancellations: 0  Verbalizes the need for more education: 1  Navigation Acuity: 5     Follow Up  No follow-ups on file.

## 2024-04-02 NOTE — ASSESSMENT & PLAN NOTE
Adenocarcinoma. PDL1 positive.  PET scan now. ? Adrenal lesion  With the location, would require a LULectomy with sleeve procedure and with his PFTs I do not feel he would survive this without significant impairment.  XRT remains an adequate option if limited disease.  Has fu with oncology scheduled.

## 2024-04-02 NOTE — ASSESSMENT & PLAN NOTE
Continue home O2 as needed, especially at night. Will retest after treatment response and suspect O2 needs will resolve

## 2024-04-02 NOTE — PROGRESS NOTES
Subjective:       Patient ID: Jordin Allen Jr. is a 78 y.o. male.  The patient's last visit with me was on 2/8/2024.     Admitted after severe hemoptysis. Found to have SANJAY mass with endobronchial component- adenocarcinoma.    Was having increased cough and dyspnea for a few days but after using treatments, he has improved and currently not having dyspnea or severe cough.     No f/c/ns    Discussed how his COPD and CAD affects treatment decisions for his NSCLC  Review of Systems    Objective:      Vitals:    04/02/24 0952   BP: 119/79   Pulse: 85     Wt Readings from Last 3 Encounters:   04/02/24 104.5 kg (230 lb 6.1 oz)   03/27/24 99.8 kg (220 lb 0.3 oz)   03/19/24 103.4 kg (228 lb)     Temp Readings from Last 3 Encounters:   03/22/24 97.9 °F (36.6 °C) (Oral)   03/06/24 98.5 °F (36.9 °C) (Temporal)   02/05/24 97.9 °F (36.6 °C) (Oral)     BP Readings from Last 3 Encounters:   04/02/24 119/79   03/27/24 110/60   03/22/24 128/68     Pulse Readings from Last 3 Encounters:   04/02/24 85   03/27/24 98   03/22/24 86       Physical Exam    CBC  Lab Results   Component Value Date    WBC 12.86 (H) 03/22/2024    HGB 12.9 (L) 03/22/2024    HCT 40.0 03/22/2024    MCV 81 (L) 03/22/2024     03/22/2024         CMP  Sodium   Date Value Ref Range Status   03/27/2024 142 136 - 145 mmol/L Final     Potassium   Date Value Ref Range Status   03/27/2024 3.2 (L) 3.5 - 5.1 mmol/L Final     Chloride   Date Value Ref Range Status   03/27/2024 103 95 - 110 mmol/L Final     CO2   Date Value Ref Range Status   03/27/2024 27 23 - 29 mmol/L Final     Glucose   Date Value Ref Range Status   03/27/2024 147 (H) 70 - 110 mg/dL Final     BUN   Date Value Ref Range Status   03/27/2024 36 (H) 8 - 23 mg/dL Final     Creatinine   Date Value Ref Range Status   03/27/2024 1.2 0.5 - 1.4 mg/dL Final     Calcium   Date Value Ref Range Status   03/27/2024 10.0 8.7 - 10.5 mg/dL Final     Total Protein   Date Value Ref Range Status   03/27/2024 7.0 6.0  "- 8.4 g/dL Final     Albumin   Date Value Ref Range Status   03/27/2024 3.8 3.5 - 5.2 g/dL Final     Total Bilirubin   Date Value Ref Range Status   03/27/2024 0.8 0.1 - 1.0 mg/dL Final     Comment:     For infants and newborns, interpretation of results should be based  on gestational age, weight and in agreement with clinical  observations.    Premature Infant recommended reference ranges:  Up to 24 hours.............<8.0 mg/dL  Up to 48 hours............<12.0 mg/dL  3-5 days..................<15.0 mg/dL  6-29 days.................<15.0 mg/dL       Alkaline Phosphatase   Date Value Ref Range Status   03/27/2024 113 55 - 135 U/L Final     AST   Date Value Ref Range Status   03/27/2024 22 10 - 40 U/L Final     ALT   Date Value Ref Range Status   03/27/2024 41 10 - 44 U/L Final     Anion Gap   Date Value Ref Range Status   03/27/2024 12 8 - 16 mmol/L Final     eGFR   Date Value Ref Range Status   03/27/2024 >60.0 >60 mL/min/1.73 m^2 Final       ABG  POC PH   Date Value Ref Range Status   04/02/2013 7.476 (H) 7.35 - 7.45 Final     POC PO2   Date Value Ref Range Status   04/02/2013 76 (L) 80 - 100 mmHg Final     POC PCO2   Date Value Ref Range Status   04/02/2013 32.4 (L) 35 - 45 mmHg Final           Personal Diagnostic Review  I have personally reviewed the following data and added my own interpretation as below:  Discussed with Dr Churchill  CT Chest 3/19/24 images personally reviewed and new SANJAY hilar mass extending into SANJAY bronchus and impinging on PA.  Pathology reviewed. Adenocarcinoma, PDL1 postive      4/2/2024     9:52 AM 3/27/2024     4:12 PM 3/22/2024     4:02 PM 3/22/2024     1:55 PM 3/22/2024    11:18 AM 3/22/2024     7:23 AM 3/22/2024     7:20 AM   Pulmonary Function Tests   SpO2 98 % 95 % 98 % 95 % 96 % 99 % 99 %   Height 5' 8" (1.727 m)         Weight 104.5 kg (230 lb 6.1 oz) 99.8 kg (220 lb 0.3 oz)        BMI (Calculated) 35               Assessment:       1. NSCLC of left lung    2. Chronic obstructive " pulmonary disease, unspecified COPD type    3. Chronic respiratory failure with hypoxia    4. Coronary artery disease involving native coronary artery of native heart without angina pectoris        Outpatient Encounter Medications as of 4/2/2024   Medication Sig Dispense Refill    albuterol (ACCUNEB) 0.63 mg/3 mL Nebu Take 3 mLs (0.63 mg total) by nebulization every 6 (six) hours as needed (if symptoms failed to improve with inhaler). Rescue 375 mL 11    albuterol (PROVENTIL/VENTOLIN HFA) 90 mcg/actuation inhaler Inhale 2 puffs into the lungs every 4 (four) hours as needed for Wheezing. 8.5 g 11    amitriptyline (ELAVIL) 25 MG tablet Take 1 tablet (25 mg total) by mouth once daily. 90 tablet 1    amLODIPine (NORVASC) 5 MG tablet Take 1 tablet (5 mg total) by mouth once daily. 90 tablet 3    aspirin (ECOTRIN) 81 MG EC tablet Take 81 mg by mouth once daily.       atorvastatin (LIPITOR) 80 MG tablet TAKE 1 TABLET (80 MG TOTAL) BY MOUTH ONCE DAILY. 90 tablet 3    benzonatate (TESSALON) 100 MG capsule Take 1 capsule (100 mg total) by mouth 3 (three) times daily as needed for Cough. 30 capsule 0    BETA-CAROTENE,A, W-C & E/MIN (OCUVITE ORAL) Take 1 capsule by mouth once daily.       budesonide-glycopyr-formoterol (BREZTRI AEROSPHERE) 160-9-4.8 mcg/actuation HFAA Inhale 2 puffs into the lungs 2 (two) times a day. 10.7 g 3    echinacea 400 mg Cap Take 1 capsule by mouth once daily.       fluticasone propionate (FLONASE) 50 mcg/actuation nasal spray 2 sprays (100 mcg total) by Each Nostril route once daily. 16 g 11    hydroCHLOROthiazide (HYDRODIURIL) 25 MG tablet Take 1 tablet (25 mg total) by mouth once daily. 90 tablet 3    ketoconazole (NIZORAL) 2 % cream Apply topically once daily. 30 g 1    latanoprost 0.005 % ophthalmic solution Place 1 drop into both eyes once daily.       losartan (COZAAR) 100 MG tablet Take 1 tablet (100 mg total) by mouth once daily. 90 tablet 2    nitroGLYCERIN (NITROSTAT) 0.4 MG  SL tablet Place 1 tablet (0.4 mg total) under the tongue every 5 (five) minutes as needed. 100 tablet 3    omeprazole (PRILOSEC) 20 MG capsule Take 1 capsule (20 mg total) by mouth once daily. 90 capsule 3    potassium chloride SA (K-DUR,KLOR-CON M) 10 MEQ tablet Take 1 tablet (10 mEq total) by mouth 2 (two) times daily. for 5 days 10 tablet 0    predniSONE (DELTASONE) 5 MG tablet TAKE 1 TABLET BY MOUTH EVERY OTHER DAY. TAKE WITH FOOD 30 tablet 11    roflumilast (DALIRESP) 500 mcg Tab Take 1 tablet (500 mcg total) by mouth once daily. 90 tablet 3    urea (CARMOL) 40 % Crea Apply topically once daily. 85 g 10    benzonatate (TESSALON) 200 MG capsule Take 1 capsule (200 mg total) by mouth 3 (three) times daily as needed for Cough. 90 capsule 3    [] predniSONE (DELTASONE) 20 MG tablet Take 2 tablets (40 mg total) by mouth once daily. for 5 days 10 tablet 0    [DISCONTINUED] fluticasone propionate (FLONASE) 50 mcg/actuation nasal spray 1 spray (50 mcg total) by Each Nostril route once daily. 16 g 11    [DISCONTINUED] nebulizers Misc       [DISCONTINUED] acetaminophen tablet 650 mg       [DISCONTINUED] albuterol inhaler 2 puff       [DISCONTINUED] albuterol-ipratropium 2.5 mg-0.5 mg/3 mL nebulizer solution 3 mL       [DISCONTINUED] amitriptyline tablet 25 mg       [DISCONTINUED] amLODIPine tablet 5 mg       [DISCONTINUED] aspirin EC tablet 81 mg       [DISCONTINUED] aspirin EC tablet 81 mg       [DISCONTINUED] atorvastatin tablet 80 mg       [DISCONTINUED] benzonatate capsule 100 mg       [DISCONTINUED] dextrose 10% bolus 125 mL 125 mL       [DISCONTINUED] dextrose 10% bolus 250 mL 250 mL       [DISCONTINUED] fluticasone furoate-vilanteroL 200-25 mcg/dose diskus inhaler 1 puff       [DISCONTINUED] fluticasone propionate 50 mcg/actuation nasal spray 50 mcg       [DISCONTINUED] glucagon (human recombinant) injection 1 mg       [DISCONTINUED] glucose chewable tablet 16 g       [DISCONTINUED]  glucose chewable tablet 24 g       [DISCONTINUED] hydroCHLOROthiazide tablet 25 mg       [DISCONTINUED] insulin aspart U-100 pen 0-5 Units       [DISCONTINUED] ipratropium 0.02 % nebulizer solution 0.5 mg       [DISCONTINUED] losartan tablet 100 mg       [DISCONTINUED] pantoprazole EC tablet 40 mg       [DISCONTINUED] roflumilast (DALIRESP) tablet 500 mcg       [DISCONTINUED] sodium chloride 0.9% flush 10 mL       [DISCONTINUED] tiotropium bromide 2.5 mcg/actuation inhaler 2 puff        No facility-administered encounter medications on file as of 4/2/2024.     1. NSCLC of left lung  - NM PET CT FDG Skull Base to Mid Thigh; Future    2. Chronic obstructive pulmonary disease, unspecified COPD type    3. Chronic respiratory failure with hypoxia    4. Coronary artery disease involving native coronary artery of native heart without angina pectoris    Plan:     Problem List Items Addressed This Visit          Pulmonary    Chronic obstructive pulmonary disease    Overview     74 yo male, former  comes for his periodic pulmonary visit. He saw Dr. Estrada in April and has done well, with no COPD exacerbations. He recently moved to [place] to be closer to his daughter. He uses symbicort and spiriva and his maintenance medications. Peak flow 270 l/min  In April his Fev-1: 1.33 liters 47%. He is an avid golfer and plays 3-4 times a week weather permitting. He has a single cardiac stent.  Proximal LAD was occluded and was opened and a stent placed, 10/31.13  No further symptoms.         Current Assessment & Plan     Significant chronic condition to be followed longitudinally with following long term treatment plan.     Known COPD - recent spirometry with severe COPD. On breztri. Not in exacerbation.  - continue triple therapy   - Continue Rolfumilast           Chronic respiratory failure with hypoxia    Current Assessment & Plan     Continue home O2 as needed, especially at night. Will retest after treatment  response and suspect O2 needs will resolve            Cardiac/Vascular    CAD (coronary artery disease) (Chronic)    Overview     -[facility] (10/31/13) for stemi: pLAD 100%, Cx with Li's, mRCA 40%, LVEF 55%,. LVEDP 15   -Xience 3.0 x 33 mm to pLAD, post dilated with 3.5 NC and 4.0 NC.   -TTE (8/14/13): LVEF 55%, grade I diastolic dysfunction         Current Assessment & Plan     Has stress test planned. Would not place stents until lung cancer treatments initiated.            Oncology    NSCLC of left lung - Primary    Current Assessment & Plan     Adenocarcinoma. PDL1 positive.  PET scan now. ? Adrenal lesion  With the location, would require a LULectomy with sleeve procedure and with his PFTs I do not feel he would survive this without significant impairment.  XRT remains an adequate option if limited disease.  Has fu with oncology scheduled.           Relevant Orders    NM PET CT FDG Skull Base to Mid Thigh         No follow-ups on file.    Future Appointments   Date Time Provider Department Center   4/4/2024 11:30 AM Sierra Landry MD [facility] IM [place]   4/12/2024  2:00 PM Garry Sanchez MD [facility] HEMONC2 [place]   4/30/2024  7:30 AM CARDIAC, PET IMAGING [facility] JOSE [place]   5/6/2024 10:30 AM Garrett Lloyd, DERREK [facility] PODIA [place]   6/6/2024  4:30 PM Prince Ba MD [facility] CARDIO [place]   6/10/2024 10:00 AM [facility]  CT1 OC CTSCAN [place]         Bienvenido Ward MD

## 2024-04-03 ENCOUNTER — HOSPITAL ENCOUNTER (OUTPATIENT)
Dept: RADIOLOGY | Facility: HOSPITAL | Age: 79
Discharge: HOME | End: 2024-04-03
Attending: HOSPITALIST
Payer: MEDICARE

## 2024-04-03 DIAGNOSIS — C34.90 MALIGNANT NEOPLASM OF UNSPECIFIED PART OF UNSPECIFIED BRONCHUS OR LUNG: ICD-10-CM

## 2024-04-03 PROBLEM — E66.01 SEVERE OBESITY (BMI 35.0-39.9) WITH COMORBIDITY: Status: ACTIVE | Noted: 2024-04-03

## 2024-04-03 PROCEDURE — 70553 MRI BRAIN STEM W/O & W/DYE: CPT | Mod: 26,,, | Performed by: STUDENT IN AN ORGANIZED HEALTH CARE EDUCATION/TRAINING PROGRAM

## 2024-04-03 PROCEDURE — A9585 GADOBUTROL INJECTION: HCPCS | Performed by: HOSPITALIST

## 2024-04-03 PROCEDURE — 25500020 PHARM REV CODE 255: Performed by: HOSPITALIST

## 2024-04-03 PROCEDURE — 70553 MRI BRAIN STEM W/O & W/DYE: CPT | Mod: TC

## 2024-04-03 RX ORDER — GADOBUTROL 604.72 MG/ML
10 INJECTION INTRAVENOUS
Status: COMPLETED | OUTPATIENT
Start: 2024-04-03 | End: 2024-04-03

## 2024-04-03 RX ADMIN — GADOBUTROL 10 ML: 604.72 INJECTION INTRAVENOUS at 04:04

## 2024-04-03 NOTE — PROGRESS NOTES
Subjective:      Chief Complaint: Follow-up (hospital)    Transitional Care Note    Family and/or Caretaker present at visit?  Yes.  Diagnostic tests reviewed/disposition: I have reviewed all completed as well as pending diagnostic tests at the time of discharge.  Disease/illness education:  Lung adenocarcinoma  Home health/community services discussion/referrals: Patient does not have home health established from hospital visit.  They do not need home health.  If needed, we will set up home health for the patient.   Establishment or re-establishment of referral orders for community resources: No other necessary community resources.   Discussion with other health care providers: No discussion with other health care providers necessary.      Medication reconciliation completed    HPI  Mr.William Tiffany Erwin is an exceptionally nice 78-year-old man with recently diagnosed left hilar lung adenocarcinoma in setting of centrilobular emphysema/COPD with secondary chronic hypoxic respiratory failure (following with pulmonology), coronary artery disease/aortic atherosclerosis/hyperlipidemia (atorvastatin 80), hypertension (hydrochlorothiazide 25 and losartan 100), distant albeit significant smoking history (quit 12 years ago) GERD (omeprazole 20), and obstructive sleep apnea (using home CPAP) presenting for hospital follow-up:     Hospital follow-up:   -hospitalized for hemoptysis  -bronchoscopy 03/20/2024 for formal tissue diagnosis will referring for outpatient oncology establishment (will do so after completing upcoming scheduled PET scan)  -no longer requiring supplemental O2 (has portable concentrator with him today) and has had no further episodes of hemoptysis   -MRI brain conducted yesterday concerning for single focus of potential metastasis  -reportedly not a surgical candidate via pulmonology  -is aware of all upcoming appointments  -no formal prior code status discussion    Review of Systems   Constitutional:   Negative for appetite change, chills and fever.   HENT: Negative.     Respiratory:  Negative for cough, chest tightness and shortness of breath.    Cardiovascular:  Negative for chest pain, palpitations and leg swelling.   Gastrointestinal:  Negative for abdominal distention, abdominal pain, blood in stool, constipation, diarrhea, nausea and vomiting.   Endocrine: Negative.    Genitourinary:  Negative for difficulty urinating, dysuria, frequency and hematuria.   Musculoskeletal: Negative.    Integumentary:  Negative.   Neurological: Negative.    Psychiatric/Behavioral: Negative.           Objective:      Vitals:    04/04/24 1113   BP: 124/80   Pulse: 69   Resp: 18   Temp: 98 °F (36.7 °C)      Physical Exam  Vitals reviewed.   Constitutional:       General: He is not in acute distress.     Appearance: Normal appearance.   HENT:      Head: Normocephalic and atraumatic.      Comments: Facial features are symmetric      Nose: Nose normal. No congestion or rhinorrhea.      Mouth/Throat:      Mouth: Mucous membranes are moist.      Pharynx: Oropharynx is clear. No oropharyngeal exudate or posterior oropharyngeal erythema.   Eyes:      General: No scleral icterus.     Extraocular Movements: Extraocular movements intact.      Conjunctiva/sclera: Conjunctivae normal.   Cardiovascular:      Rate and Rhythm: Normal rate and regular rhythm.      Pulses: Normal pulses.      Heart sounds: Normal heart sounds.   Pulmonary:      Effort: Pulmonary effort is normal. No respiratory distress.      Breath sounds: Normal breath sounds.   Musculoskeletal:         General: No deformity or signs of injury. Normal range of motion.      Cervical back: Normal range of motion.      Comments: Gait normal    Skin:     General: Skin is warm and dry.      Findings: No rash.   Neurological:      General: No focal deficit present.      Mental Status: He is alert and oriented to person, place, and time. Mental status is at baseline.   Psychiatric:          Mood and Affect: Mood normal.         Behavior: Behavior normal.         Thought Content: Thought content normal.       Current Outpatient Medications on File Prior to Visit   Medication Sig Dispense Refill    albuterol (ACCUNEB) 0.63 mg/3 mL Nebu Take 3 mLs (0.63 mg total) by nebulization every 6 (six) hours as needed (if symptoms failed to improve with inhaler). Rescue 375 mL 11    albuterol (PROVENTIL/VENTOLIN HFA) 90 mcg/actuation inhaler Inhale 2 puffs into the lungs every 4 (four) hours as needed for Wheezing. 8.5 g 11    amitriptyline (ELAVIL) 25 MG tablet Take 1 tablet (25 mg total) by mouth once daily. 90 tablet 1    amLODIPine (NORVASC) 5 MG tablet Take 1 tablet (5 mg total) by mouth once daily. 90 tablet 3    aspirin (ECOTRIN) 81 MG EC tablet Take 81 mg by mouth once daily.       atorvastatin (LIPITOR) 80 MG tablet TAKE 1 TABLET (80 MG TOTAL) BY MOUTH ONCE DAILY. 90 tablet 3    BETA-CAROTENE,A, W-C & E/MIN (OCUVITE ORAL) Take 1 capsule by mouth once daily.       budesonide-glycopyr-formoterol (BREZTRI AEROSPHERE) 160-9-4.8 mcg/actuation HFAA Inhale 2 puffs into the lungs 2 (two) times a day. 10.7 g 3    echinacea 400 mg Cap Take 1 capsule by mouth once daily.       fluticasone propionate (FLONASE) 50 mcg/actuation nasal spray 2 sprays (100 mcg total) by Each Nostril route once daily. 16 g 11    hydroCHLOROthiazide (HYDRODIURIL) 25 MG tablet Take 1 tablet (25 mg total) by mouth once daily. 90 tablet 3    ketoconazole (NIZORAL) 2 % cream Apply topically once daily. 30 g 1    latanoprost 0.005 % ophthalmic solution Place 1 drop into both eyes once daily.       losartan (COZAAR) 100 MG tablet Take 1 tablet (100 mg total) by mouth once daily. 90 tablet 2    nitroGLYCERIN (NITROSTAT) 0.4 MG SL tablet Place 1 tablet (0.4 mg total) under the tongue every 5 (five) minutes as needed. 100 tablet 3    omeprazole (PRILOSEC) 20 MG capsule Take 1 capsule (20 mg total) by mouth once daily. 90 capsule 3    predniSONE  (DELTASONE) 5 MG tablet TAKE 1 TABLET BY MOUTH EVERY OTHER DAY. TAKE WITH FOOD 30 tablet 11    roflumilast (DALIRESP) 500 mcg Tab Take 1 tablet (500 mcg total) by mouth once daily. 90 tablet 3    urea (CARMOL) 40 % Crea Apply topically once daily. 85 g 10    benzonatate (TESSALON) 200 MG capsule Take 1 capsule (200 mg total) by mouth 3 (three) times daily as needed for Cough. 90 capsule 3     Current Facility-Administered Medications on File Prior to Visit   Medication Dose Route Frequency Provider Last Rate Last Admin    [COMPLETED] gadobutroL (GADAVIST) injection 10 mL  10 mL Intravenous ONCE PRN Isacc Nuñez IV, MD   10 mL at 04/03/24 0941         Assessment:       1. Hospital discharge follow-up    2. Severe obesity (BMI 35.0-39.9) with comorbidity        Plan:       Hospital discharge follow-up   - doing very well since time of hospital discharge   - rather tamra discussion had regarding inability to make prognostic predictions prior to upcoming PET scan and establishment with Oncology.  - code status discussion had; patient wishes to defer formal completion of [place] post pending discussion with his daughter (surrogate decisionmaker); blank copy provided for him to review with her and will completed with him at his convenience if he wishes to defer any emergent medical services      Severe obesity (BMI 35.0-39.9) with comorbidity   - has already begun to lose weight   - Strongly encouraged to aggressively keep up with nutritional intake given presumption of impending chemotherapy and/or radiation

## 2024-04-04 ENCOUNTER — OFFICE VISIT (OUTPATIENT)
Dept: INTERNAL MEDICINE | Facility: CLINIC | Age: 79
End: 2024-04-04
Payer: MEDICARE

## 2024-04-04 VITALS
SYSTOLIC BLOOD PRESSURE: 124 MMHG | WEIGHT: 221.56 LBS | HEIGHT: 68 IN | BODY MASS INDEX: 33.58 KG/M2 | TEMPERATURE: 98 F | OXYGEN SATURATION: 99 % | DIASTOLIC BLOOD PRESSURE: 80 MMHG | RESPIRATION RATE: 18 BRPM | HEART RATE: 69 BPM

## 2024-04-04 DIAGNOSIS — Z09 HOSPITAL DISCHARGE FOLLOW-UP: Primary | ICD-10-CM

## 2024-04-04 DIAGNOSIS — E66.01 SEVERE OBESITY (BMI 35.0-39.9) WITH COMORBIDITY: ICD-10-CM

## 2024-04-04 LAB
DNA RANGE(S) EXAMINED NAR: NORMAL
GENE DIS ANL INTERP-IMP: POSITIVE
GENE DIS ASSESSED: NORMAL
GENE MUT TESTED BLD/T: 9.5 M/MB
MSI CA SPEC-IMP: NORMAL
PD-L1 BY 22C3 TISS IMSTN DOC: POSITIVE
REASON FOR STUDY: NORMAL
TEMPUS FUSIONADDENDUM: NORMAL
TEMPUS PD-L1 (22C3) COMBINED POSITIVE SCORE: 45
TEMPUS PD-L1 (22C3) TUMOR PROPORTION SCORE: 35 %
TEMPUS PERTINENTNEGATIVES: NORMAL
TEMPUS PORTAL: NORMAL
TEMPUS THERAPY1: NORMAL
TEMPUS THERAPY2: NORMAL
TEMPUS THERAPY3: NORMAL
TEMPUS THERAPY4: NORMAL
TEMPUS THERAPYCOUNT: 4
TEMPUS TRIAL1: NORMAL
TEMPUS TRIAL2: NORMAL
TEMPUS TRIAL3: NORMAL
TEMPUS TRIALCOUNT: 3

## 2024-04-04 PROCEDURE — 99214 OFFICE O/P EST MOD 30 MIN: CPT | Mod: PBBFAC,PO | Performed by: STUDENT IN AN ORGANIZED HEALTH CARE EDUCATION/TRAINING PROGRAM

## 2024-04-04 PROCEDURE — 99213 OFFICE O/P EST LOW 20 MIN: CPT | Mod: S$PBB,,, | Performed by: STUDENT IN AN ORGANIZED HEALTH CARE EDUCATION/TRAINING PROGRAM

## 2024-04-04 PROCEDURE — 99999 PR PBB SHADOW E&M-EST. PATIENT-LVL IV: CPT | Mod: PBBFAC,,, | Performed by: STUDENT IN AN ORGANIZED HEALTH CARE EDUCATION/TRAINING PROGRAM

## 2024-04-09 ENCOUNTER — TELEPHONE (OUTPATIENT)
Dept: HEMATOLOGY/ONCOLOGY | Facility: CLINIC | Age: 79
End: 2024-04-09
Payer: MEDICARE

## 2024-04-09 NOTE — TELEPHONE ENCOUNTER
I called patient to review [employer] testing and their financial assistance, unable to leave a message, however patient called right back to review. Requested to review FA info with his daughter, info sent [employer], and direct numbers noted to reach out anytime with any questions.

## 2024-04-10 NOTE — PROGRESS NOTES
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 Allen is a 78-year-old male with history of tobacco and COPD/emphysema who was diagnosed with adenocarcinoma of the lung after he presented to the emergency room with hemoptysis in March 2024.  A CTA of the lung on March 19, 2024 revealed a 3.4 x 5.8 cm left hilar mass suggestive of neoplasm.  The mass surrounds the left upper lobe pulmonary artery with probable invasion of the distal left mainstem bronchus and left upper lobe bronchi.  There is probable involvement of the superior left pulmonary vein.  There are few adjacent subcentimeter left upper lobe nodular foci noted.  He underwent EBUS and biopsy on March 21, 2024.  Pathology revealed left upper lobe bronchial biopsy consistent with adenocarcinoma.  PD-L1 testing and tempus NGS have been ordered with results pending.  An MRI of the brain on April 3, 2023 reveals 0.8 cm rim enhancing cystic appearing lesion in the left thalamus which appears cystic without significant mass effect or associated edema.  There has no internal diffusion restriction noted.  The findings are nonspecific but concerning due to his history of lung cancer.  Short interval follow-up is recommended.  He is here today for discussion regarding definitive radiation.  A PET scan on April 11, 2024 reveals hypermetabolic left hilar mass measuring 4.8 x 2.3 cm with SUV max of 14.  There are adjacent satellite pulmonary nodules seen including a 1.2 cm nodule with faint uptake similar to background.  No pathologically enlarged or hypermetabolic mediastinal lymph nodes are noted.    At present, he denies further episodes of hemoptysis since his hospital admission. He was sent home with 2 liters of O2 via nasal cannula at discharge which he hasn't required after 3 days at home. He reports worsening shortness of breath over the last one year and intermittent cough.  He has history of CPAP use for 2 years. He quit smoking 12 year ago. He uses albuterol inhalers several times per day. He denies fever, night sweats, or weight loss. He also denies headaches, vision changes, dizziness, nausea, vomiting, or upper or lower extremity weakness.    REVIEW OF SYSTEMS:  As above.  In addition, patient denies headaches, visual problems, dizziness, chest pain, nausea, vomiting, diarrhea, or any new bony pains. Patient also denies easy bruising, skin rashes, or numbness or tingling.    ECO-1    PAST MEDICAL HISTORY:  Past Medical History:   Diagnosis Date    Benign neoplasm of colon 10/01/2013    Bronchitis chronic     CAD (coronary artery disease) 10/31/2013    COPD (chronic obstructive pulmonary disease)     Emphysema of lung     GERD (gastroesophageal reflux disease)     Hx of colonic polyps     Hypertension     NAFLD (nonalcoholic fatty liver disease) 2017    SHOLA on CPAP     RLS (restless legs syndrome)     Screen for colon cancer 2013       PAST SURGICAL HISTORY:  Past Surgical History:   Procedure Laterality Date    bilateral foot surgery      CARDIAC CATHETERIZATION  2013    x1    CATARACT EXTRACTION, BILATERAL      COLON SURGERY      Ace Mott MD    COLONOSCOPY N/A 3/21/2018    Procedure: COLONOSCOPY;  Surgeon: Terry Mott MD;  Location: [facility] ([address] [place] [address]);  Service: Endoscopy;  Laterality: N/A;    COLONOSCOPY N/A 2019    Procedure: COLONOSCOPY;  Surgeon: John Mantilla MD;  [facility]  Service: Endoscopy;  Laterality: N/A;    COLONOSCOPY N/A 2022    Procedure: COLONOSCOPY;  Surgeon: MEDINA Farris MD;  Location: [facility] ([address] [place] [address]);  Service: Endoscopy;  Laterality: N/A;  fully vacc-inst portal-tb    scrotal abscess removal         ALLERGIES:   Review of patient's allergies indicates:   Allergen Reactions    Brilinta [ticagrelor] Itching    Lisinopril      Other reaction(s): cough    Metoprolol succinate Rash        MEDICATIONS:  Current Outpatient Medications   Medication Sig    albuterol (ACCUNEB) 0.63 mg/3 mL Nebu Take 3 mLs (0.63 mg total) by nebulization every 6 (six) hours as needed (if symptoms failed to improve with inhaler). Rescue    albuterol (PROVENTIL/VENTOLIN HFA) 90 mcg/actuation inhaler Inhale 2 puffs into the lungs every 4 (four) hours as needed for Wheezing.    amitriptyline (ELAVIL) 25 MG tablet Take 1 tablet (25 mg total) by mouth once daily.    amLODIPine (NORVASC) 5 MG tablet Take 1 tablet (5 mg total) by mouth once daily.    aspirin (ECOTRIN) 81 MG EC tablet Take 81 mg by mouth once daily.     atorvastatin (LIPITOR) 80 MG tablet TAKE 1 TABLET (80 MG TOTAL) BY MOUTH ONCE DAILY.    benzonatate (TESSALON) 200 MG capsule Take 1 capsule (200 mg total) by mouth 3 (three) times daily as needed for Cough.    BETA-CAROTENE,A, W-C & E/MIN (OCUVITE ORAL) Take 1 capsule by mouth once daily.     budesonide-glycopyr-formoterol (BREZTRI AEROSPHERE) 160-9-4.8 mcg/actuation HFAA Inhale 2 puffs into the lungs 2 (two) times a day.    echinacea 400 mg Cap Take 1 capsule by mouth once daily.     fluticasone propionate (FLONASE) 50 mcg/actuation nasal spray 2 sprays (100 mcg total) by Each Nostril route once daily.    hydroCHLOROthiazide (HYDRODIURIL) 25 MG tablet Take 1 tablet (25 mg total) by mouth once daily.    ketoconazole (NIZORAL) 2 % cream Apply topically once daily.    latanoprost 0.005 % ophthalmic solution Place 1 drop into both eyes once daily.     losartan (COZAAR) 100 MG tablet Take 1 tablet (100 mg total) by mouth once daily.    nitroGLYCERIN (NITROSTAT) 0.4 MG SL tablet Place 1 tablet (0.4 mg total) under the tongue every 5 (five) minutes as needed.    omeprazole (PRILOSEC) 20 MG capsule Take 1 capsule (20 mg total) by mouth once daily.    predniSONE (DELTASONE) 5 MG tablet TAKE 1 TABLET BY MOUTH EVERY OTHER DAY. TAKE WITH FOOD    roflumilast (DALIRESP) 500 mcg Tab Take 1 tablet (500 mcg total) by mouth  once daily.    urea (CARMOL) 40 % Crea Apply topically once daily.     No current facility-administered medications for this visit.       SOCIAL HISTORY:  Social History     Socioeconomic History    Marital status:    Tobacco Use    Smoking status: Former     Current packs/day: 0.00     Average packs/day: 1 pack/day for 40.0 years (40.0 ttl pk-yrs)     Types: Cigarettes     Start date: 8/15/1972     Quit date: 8/15/2012     Years since quittin.6     Passive exposure: Never    Smokeless tobacco: Never   Substance and Sexual Activity    Alcohol use: Yes     Alcohol/week: 3.0 standard drinks of alcohol     Types: 3 Cans of beer per week     Comment: maybe 2-3  beers weekly    Drug use: No    Sexual activity: Yes     Partners: Female   Social History Narrative    Retired .  .       Social Determinants of Health     Financial Resource Strain: Low Risk  (3/21/2024)    Overall Financial Resource Strain (CARDIA)     Difficulty of Paying Living Expenses: Not very hard   Food Insecurity: No Food Insecurity (3/21/2024)    Hunger Vital Sign     Worried About Running Out of Food in the Last Year: Never true     Ran Out of Food in the Last Year: Never true   Transportation Needs: No Transportation Needs (3/21/2024)    PRAPARE - Transportation     Lack of Transportation (Medical): No     Lack of Transportation (Non-Medical): No   Physical Activity: Inactive (3/21/2024)    Exercise Vital Sign     Days of Exercise per Week: 0 days     Minutes of Exercise per Session: 0 min   Stress: No Stress Concern Present (3/21/2024)    [employer] [place] - Occupational Stress Questionnaire     Feeling of Stress : Only a little   Social Connections: Moderately Integrated (3/21/2024)    Social Connection and Isolation Panel [NHANES]     Frequency of Communication with Friends and Family: More than three times a week     Frequency of Social Gatherings with Friends and Family: More than three  "times a week     Attends [employer] Services: More than 4 times per year     Active Member of Clubs or Organizations: Yes     Attends Club or Organization Meetings: More than 4 times per year     Marital Status:    Housing Stability: Low Risk  (3/21/2024)    Housing Stability Vital Sign     Unable to Pay for Housing in the Last Year: No     Number of Places Lived in the Last Year: 1     Unstable Housing in the Last Year: No       FAMILY HISTORY:  Family History   Problem Relation Age of Onset    Heart disease Mother     Heart attack Mother     Hypertension Mother     No Known Problems Sister     No Known Problems Daughter     Asthma Neg Hx     Emphysema Neg Hx     Prostate cancer Neg Hx     Cirrhosis Neg Hx          PHYSICAL EXAMINATION:  Vitals:    04/12/24 0853   BP: 136/73   Pulse: 94   SpO2: 97%   Weight: 99.6 kg (219 lb 9.3 oz)   Height: 5' 8" (1.727 m)   Body mass index is 33.39 kg/m².   GENERAL: Patient is alert and oriented, in no acute distress.  HEENT:Extraocular muscles are intact.  Oropharynx is clear without lesions.  There is no cervical or supraclavicular lymphadenopathy palpated.  No thyromegaly noted.  HEART: Regular rate and rhythm.  LUNGS: Clear to auscultation bilaterally.  ABDOMEN:Soft, nontender, nondistended, without hepatosplenomegaly.  Normoactive bowel sounds.  EXTREMITIES: No clubbing, cyanosis, or edema.  NEUROLOGICAL: Cranial nerve II through XII grossly intact.  Sensation is intact.  Strength is 5 out of 5 in the upper and lower extremities bilaterally.     ASSESSMENT:   This is a 78-year-old male with history of smoking and COPD, now with adenocarcinoma of the left hilar region after EBUS on March 21, 2024 with a nonspecific rim enhancing lesion in the left thalamus    PLAN:   After review of the pathology and images of the radiological studies, Mr. Allen is noted to have a left hilar mass without mediastinal adenopathy.  I reviewed the images of the MRI of the brain with " Radiology.  Based on imaging, the left thalamic lesion is nonspecific based on internal diffusion characteristics.  However, metastatic disease can not be excluded given the diagnosis of non-small-cell lung cancer.  The recommendation is to follow-up with short interval repeat MRI brain.  For best chance of long-term outcome, he is recommended to undergo radiation to the left hilar lesion concurrent with chemotherapy.  I plan to deliver approximately 60 Gy. He is scheduled to see Dr. Ponce later today regarding chemotherapy.    The risks, benefits, and side effects of radiation were explained in detail to the patient and his daughter who is here today.  All questions were answered and informed consent was signed.  I plan to see the patient back for radiation planning CT during the next week.    Psychosocial Distress screening score of Distress Score: 7 noted and reviewed. Patient declines intervention at this time.     I spent approximately 60 minutes reviewing the available records and evaluating the patient, out of which over 50% of the time was spent face to face with the patient in counseling and coordinating this patient's care.

## 2024-04-11 ENCOUNTER — HOSPITAL ENCOUNTER (OUTPATIENT)
Dept: RADIOLOGY | Facility: HOSPITAL | Age: 79
Discharge: HOME | End: 2024-04-11
Attending: INTERNAL MEDICINE
Payer: MEDICARE

## 2024-04-11 DIAGNOSIS — C34.92 NSCLC OF LEFT LUNG: ICD-10-CM

## 2024-04-11 LAB — POCT GLUCOSE: 116 MG/DL (ref 70–110)

## 2024-04-11 PROCEDURE — 78815 PET IMAGE W/CT SKULL-THIGH: CPT | Mod: TC,PI

## 2024-04-11 PROCEDURE — 78815 PET IMAGE W/CT SKULL-THIGH: CPT | Mod: 26,PI,, | Performed by: STUDENT IN AN ORGANIZED HEALTH CARE EDUCATION/TRAINING PROGRAM

## 2024-04-11 PROCEDURE — A9552 F18 FDG: HCPCS | Performed by: INTERNAL MEDICINE

## 2024-04-11 RX ORDER — FLUDEOXYGLUCOSE F18 500 MCI/ML
12 INJECTION INTRAVENOUS
Status: COMPLETED | OUTPATIENT
Start: 2024-04-11 | End: 2024-04-11

## 2024-04-11 RX ADMIN — FLUDEOXYGLUCOSE F-18 9.82 MILLICURIE: 500 INJECTION INTRAVENOUS at 11:04

## 2024-04-12 ENCOUNTER — OFFICE VISIT (OUTPATIENT)
Dept: RADIATION ONCOLOGY | Facility: CLINIC | Age: 79
End: 2024-04-12
Payer: MEDICARE

## 2024-04-12 ENCOUNTER — OFFICE VISIT (OUTPATIENT)
Dept: HEMATOLOGY/ONCOLOGY | Facility: CLINIC | Age: 79
End: 2024-04-12
Payer: MEDICARE

## 2024-04-12 VITALS
BODY MASS INDEX: 33.28 KG/M2 | HEART RATE: 94 BPM | HEIGHT: 68 IN | WEIGHT: 219.56 LBS | DIASTOLIC BLOOD PRESSURE: 73 MMHG | OXYGEN SATURATION: 97 % | SYSTOLIC BLOOD PRESSURE: 136 MMHG

## 2024-04-12 VITALS
OXYGEN SATURATION: 94 % | HEIGHT: 68 IN | BODY MASS INDEX: 33.28 KG/M2 | WEIGHT: 219.56 LBS | HEART RATE: 94 BPM | SYSTOLIC BLOOD PRESSURE: 136 MMHG | RESPIRATION RATE: 18 BRPM | TEMPERATURE: 98 F | DIASTOLIC BLOOD PRESSURE: 73 MMHG

## 2024-04-12 DIAGNOSIS — C34.12 ADENOCARCINOMA OF UPPER LOBE OF LEFT LUNG: Primary | ICD-10-CM

## 2024-04-12 DIAGNOSIS — R04.2 COUGH WITH HEMOPTYSIS: ICD-10-CM

## 2024-04-12 DIAGNOSIS — R91.8 HILAR MASS: ICD-10-CM

## 2024-04-12 PROCEDURE — 99214 OFFICE O/P EST MOD 30 MIN: CPT | Mod: PBBFAC | Performed by: RADIOLOGY

## 2024-04-12 PROCEDURE — 99205 OFFICE O/P NEW HI 60 MIN: CPT | Mod: S$PBB,,, | Performed by: HOSPITALIST

## 2024-04-12 PROCEDURE — 99999 PR PBB SHADOW E&M-EST. PATIENT-LVL IV: CPT | Mod: PBBFAC,,, | Performed by: RADIOLOGY

## 2024-04-12 PROCEDURE — 99999 PR PBB SHADOW E&M-EST. PATIENT-LVL V: CPT | Mod: PBBFAC,,, | Performed by: HOSPITALIST

## 2024-04-12 PROCEDURE — 99215 OFFICE O/P EST HI 40 MIN: CPT | Mod: PBBFAC | Performed by: HOSPITALIST

## 2024-04-12 PROCEDURE — 99204 OFFICE O/P NEW MOD 45 MIN: CPT | Mod: S$PBB,,, | Performed by: RADIOLOGY

## 2024-04-12 NOTE — Clinical Note
Good afternoon,   This patient is currently scheduled for a cardiac pet scan on 4/30/24, however has recently been diagnosed with lung cancer and I anticipate will be in the middle of combination radiation and chemotherapy by that point. Is this something that needs to be moved up, or could it possibly be pushed back?   Thank you,  Jai

## 2024-04-12 NOTE — Clinical Note
Hi all,   Saw this patient Friday, he's a stage IIB adenocarcinoma of the L hilum. Can we add him to TB to discuss surgery vs CRT? I know it's probably getting crowded.   Rai,  Jai

## 2024-04-12 NOTE — PROGRESS NOTES
[facility] MEDICAL ONCOLOGY - NEW PATIENT VISIT    Reason for visit: Adenocarcinoma of the SANJAY      Oncology History   Adenocarcinoma of upper lobe of left lung   3/19/2024 Imaging Significant Findings    CTA PE (hemoptysis)  - 3.4 x 5.8 cm L hilar mass, probable invasion of distal L main bronchus and SANJAY bronchi     3/19/2024 - 3/22/2024 Hospital Admission    Presented with new onset hemoptysis.  Imaging demonstrated left hilar mass.  Bronchoscopy performed and confirmed adenocarcinoma, EGFR Exon 20 insertion.     3/21/2024 Procedure    Bronch w/ EBUS  SANJAY, Endobronchial Bx  - Adenocarcinoma (CK7 and TTF1 positive; CK20, p40, and CK 5/6 negative)    SANJAY, BAL  - Adenocarcinoma    TEMPUS NGS/PD-L1  - PD-L1 TPS: 35%  - EGFR exon 20 inframe insertion ([id number])  - TP53 misssense ([id number])  - Negative: EGFR, ALK, BRAF, KRAS, ROS1, RET, MET, ERBB2  - TMB 9.5 m/MB     4/3/2024 Imaging Significant Findings    MRI Brain  - 0.8 cm rim enhancing lesion in L thalamus, no significant mass effect      4/11/2024 Imaging Significant Findings    PET CT  - 4.8 x 2.3 cm  L hilar mass, SUV 14  - Adjacent pulmonary nodules, e.g. 1.2 cm  - No intrathoracic LAD     4/15/2024 -  Chemotherapy    Treatment Summary   Plan Name: OP NSCLC PACLITAXEL + CARBOPLATIN (AUC) QW + RADIATION  Treatment Goal: Curative  Status: Active  Start Date: 4/15/2024 (Planned)  End Date: 5/20/2024 (Planned)  Provider: Isacc Nuñez IV, MD  Chemotherapy: CARBOPLATIN INFUSION (BY AUC), , Intravenous, Clinic/HOD 1 time, 0 of 6 cycles  PACLITAXEL INFUSION, 45 mg/m2, Intravenous, Clinic/HOD 1 time, 0 of 6 cycles            HPI:     Jordin NELA Allen Jr. is a 78 y.o. male with pmh significant for restless leg syndrome, COPD, CAD s/p stent (2013), HTN, HLD, obesity, GERD, hearing loss requiring hearing aids, and Stage IIIA (T3N1M0) adenocarcinoma of the L hilum (PD-L1 35%, EGFR exon 20 insertion), initially dx'd 3/21/24,  currently treatment naive, who presents to establish care.     Pt states that he feels well today. He notes that he had a couple of rough days after he was discharged. He used supplemental O2 for 2-3 days but says he has not needed this since. He denies any further episodes of hemoptysis since his discharge. He says he can climb 1 flight of stairs but would need to sit afterwards. He feels he could walk maybe 2 city blocks before having to take a break. He denies a baseline cough. He is able to complete all ADLs and iADLs without assistance, noting that he does use a shower stool for ease (not necessity). He says that his appetite is good and feels that his weight has been stable; upon learning that he is 219 lbs today, he says that he has lost some weight (baseline around 230).     Living Situation: He lives in [facility] by himself, though notes that he is staying with his daughter since discharge from the hospital.     Tobacco Use: He smoked from age 20 until age 66. He smoked 1 pack a day at his heaviest.     EtOH Use: Social (approximately 1 beer a week).     Employment / Exposure History: He is retired. He previously worked as a  for the [employer] service. He was in the [employer] prior to that. 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 He denies known exposures to industrial chemicals, particulate matter, or asbestos.     Family Cancer History: He denies any known family history of cancer.     History has been obtained by chart review and discussion with the patient.    Past Medical History:   Past Medical History:   Diagnosis Date    Benign neoplasm of colon 10/01/2013    Bronchitis chronic     CAD (coronary artery disease) 10/31/2013    COPD (chronic obstructive pulmonary disease)     Emphysema of lung     GERD (gastroesophageal reflux disease)     Hx of colonic polyps     Hypertension     NAFLD (nonalcoholic fatty liver disease) 03/27/2017    SHOLA on CPAP     RLS (restless  legs syndrome)     Screen for colon cancer 2013        Past Surgical History:   Past Surgical History:   Procedure Laterality Date    bilateral foot surgery      CARDIAC CATHETERIZATION  2013    x1    CATARACT EXTRACTION, BILATERAL      COLON SURGERY      Ace Mott MD    COLONOSCOPY N/A 3/21/2018    Procedure: COLONOSCOPY;  Surgeon: Terry Mott MD;  Location: [facility] ENDO (4TH FLR);  Service: Endoscopy;  Laterality: N/A;    COLONOSCOPY N/A 2019    Procedure: COLONOSCOPY;  Surgeon: John Mantilla MD;  Location: [facility] ENDO (4TH FLR);  Service: Endoscopy;  Laterality: N/A;    COLONOSCOPY N/A 2022    Procedure: COLONOSCOPY;  Surgeon: MEDINA Farris MD;  Location: [facility] ENDO (4TH FLR);  Service: Endoscopy;  Laterality: N/A;  fully vacc-inst portal-tb    scrotal abscess removal          Family History:   Family History   Problem Relation Name Age of Onset    Heart disease Mother      Heart attack Mother      Hypertension Mother      No Known Problems Sister      No Known Problems Daughter 2     Asthma Neg Hx      Emphysema Neg Hx      Prostate cancer Neg Hx      Cirrhosis Neg Hx          Social History:   Social History     Tobacco Use    Smoking status: Former     Current packs/day: 0.00     Average packs/day: 1 pack/day for 40.0 years (40.0 ttl pk-yrs)     Types: Cigarettes     Start date: 8/15/1972     Quit date: 8/15/2012     Years since quittin.6     Passive exposure: Never    Smokeless tobacco: Never   Substance Use Topics    Alcohol use: Yes     Alcohol/week: 3.0 standard drinks of alcohol     Types: 3 Cans of beer per week     Comment: maybe 2-3  beers weekly        I have reviewed and updated the patient's past medical, surgical, family and social histories.      ROS:   As per HPI.     Allergies:   Review of patient's allergies indicates:   Allergen Reactions    Brilinta [ticagrelor] Itching    Lisinopril      Other reaction(s): cough    Metoprolol succinate Rash         Medications:   Current Outpatient Medications   Medication Sig Dispense Refill    albuterol (ACCUNEB) 0.63 mg/3 mL Nebu Take 3 mLs (0.63 mg total) by nebulization every 6 (six) hours as needed (if symptoms failed to improve with inhaler). Rescue 375 mL 11    albuterol (PROVENTIL/VENTOLIN HFA) 90 mcg/actuation inhaler Inhale 2 puffs into the lungs every 4 (four) hours as needed for Wheezing. 8.5 g 11    amitriptyline (ELAVIL) 25 MG tablet Take 1 tablet (25 mg total) by mouth once daily. 90 tablet 1    amLODIPine (NORVASC) 5 MG tablet Take 1 tablet (5 mg total) by mouth once daily. 90 tablet 3    aspirin (ECOTRIN) 81 MG EC tablet Take 81 mg by mouth once daily.       atorvastatin (LIPITOR) 80 MG tablet TAKE 1 TABLET (80 MG TOTAL) BY MOUTH ONCE DAILY. 90 tablet 3    BETA-CAROTENE,A, W-C & E/MIN (OCUVITE ORAL) Take 1 capsule by mouth once daily.       budesonide-glycopyr-formoterol (BREZTRI AEROSPHERE) 160-9-4.8 mcg/actuation HFAA Inhale 2 puffs into the lungs 2 (two) times a day. 10.7 g 3    echinacea 400 mg Cap Take 1 capsule by mouth once daily.       fluticasone propionate (FLONASE) 50 mcg/actuation nasal spray 2 sprays (100 mcg total) by Each Nostril route once daily. 16 g 11    hydroCHLOROthiazide (HYDRODIURIL) 25 MG tablet Take 1 tablet (25 mg total) by mouth once daily. 90 tablet 3    ketoconazole (NIZORAL) 2 % cream Apply topically once daily. 30 g 1    latanoprost 0.005 % ophthalmic solution Place 1 drop into both eyes once daily.       losartan (COZAAR) 100 MG tablet Take 1 tablet (100 mg total) by mouth once daily. 90 tablet 2    nitroGLYCERIN (NITROSTAT) 0.4 MG SL tablet Place 1 tablet (0.4 mg total) under the tongue every 5 (five) minutes as needed. 100 tablet 3    omeprazole (PRILOSEC) 20 MG capsule Take 1 capsule (20 mg total) by mouth once daily. 90 capsule 3    predniSONE (DELTASONE) 5 MG tablet TAKE 1 TABLET BY MOUTH EVERY OTHER DAY. TAKE WITH FOOD 30 tablet 11    roflumilast (DALIRESP) 500 mcg  "Tab Take 1 tablet (500 mcg total) by mouth once daily. 90 tablet 3    urea (CARMOL) 40 % Crea Apply topically once daily. 85 g 10    benzonatate (TESSALON) 200 MG capsule Take 1 capsule (200 mg total) by mouth 3 (three) times daily as needed for Cough. 90 capsule 3     No current facility-administered medications for this visit.          Physical Exam:       /73 (BP Location: Left arm, Patient Position: Sitting, BP Method: Medium (Automatic))   Pulse 94   Temp 98 °F (36.7 °C) (Oral)   Resp 18   Ht 5' 8" (1.727 m)   Wt 99.6 kg (219 lb 9.3 oz)   SpO2 (!) 94%   BMI 33.39 kg/m²                Physical Exam  Constitutional:       Appearance: Normal appearance. He is obese.   HENT:      Head: Normocephalic and atraumatic.   Eyes:      Extraocular Movements: Extraocular movements intact.      Conjunctiva/sclera: Conjunctivae normal.      Pupils: Pupils are equal, round, and reactive to light.   Cardiovascular:      Rate and Rhythm: Normal rate and regular rhythm.      Heart sounds: No murmur heard.     No friction rub. No gallop.   Pulmonary:      Effort: Pulmonary effort is normal.      Breath sounds: No wheezing, rhonchi or rales.   Musculoskeletal:         General: Normal range of motion.      Right lower leg: No edema.      Left lower leg: No edema.   Skin:     General: Skin is warm and dry.   Neurological:      Mental Status: He is alert and oriented to person, place, and time.   Psychiatric:         Mood and Affect: Mood normal.         Thought Content: Thought content normal.         Judgment: Judgment normal.           Labs:   No results found for this or any previous visit (from the past 48 hour(s)).       Imaging:    See oncologic history above.     Path:  See oncologic history above.      Assessment and Plan:     Jordin Allen Jr. is a 78 y.o. male with pmh significant for restless leg syndrome, COPD, CAD s/p stent (2013), HTN, HLD, obesity, GERD, hearing loss requiring hearing aids, and Stage " IIIA (T3N1M0) adenocarcinoma of the L hilum (PD-L1 35%, EGFR exon 20 insertion), initially dx'd 3/21/24, currently treatment naive, who presents to establish care.     Stage IIIA Adenocarcinoma of L Hilum, EGFR Exon 20 insertion, PD-L1 35%  ECOG PS 1 (limited by MONTIEL).  Patient presents today to establish care.  Oncologic history as above, however briefly, patient presented with hemoptysis and was found to have a left upper lobe mass, bronchoscopy of which was consistent with adenocarcinoma.  NGS demonstrates an Exon 20 insertion as well as a PDL1 of 35%.  Staging scans are complete and PET does not demonstrate FDG avid adenopathy or disease elsewhere.  Notably, the MRI brain does show a 0.8 cm rim enhancing lesion in the left thalamus without surrounding edema. 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  Discussed with both radiation oncology and with the patient favoring treating the CNS lesions as a benign process with close monitoring going forward.  In that situation, patient appears to have Stage IIIA disease given a 5.8 cm lesion on the initial CT scan.  I discussed the patient's diagnosis, reviewed his imaging, and discussed his stage (initially discussed stage IIB, however on further review, the direct hilar extension makes this Stage IIIA).  We discussed that treatment curative in intent and may consist of a combination of systemic therapy and surgery or of systemic therapy and radiation therapy.  Will discuss patient at tumor board to determine surgical eligibility versus preference for CRT; notably, the patient does have COPD and CAD.    PLAN:   -- PFTs ordered  -- Discuss at TB on 4/17/24, including ability to obtain mediastinal staging  -- Pt has a radiation simulation scheduled the week of 4/15/25  -- Will preemptively order  weekly carboplatin/paclitaxel  -- Message sent to Dr. Ba regarding canceling the cardiac PET on 4/30/24 as pt may be mid-treatment  -- RTC after TB complete    The above information has been reviewed with the patient and all questions have been answered to their apparent satisfaction.  They understand that they can call the clinic with any questions.    Isacc Nuñez MD  Hematology/Oncology  [facility]      Route Chart for Scheduling    Med Onc Chart Routing      Follow up with physician . RTC 4/25/24   Follow up with JARAD    Infusion scheduling note    Injection scheduling note    Labs    Imaging    Pharmacy appointment    Other referrals                    Disclaimer: This note was prepared using voice recognition system and is likely to have sound alike errors and is not proof read.  Please call me with any questions.     *Staging updated above after further review of imaging, will discuss with patient at next appointment, treatment recommendations are not changed*

## 2024-04-12 NOTE — Clinical Note
Luis Reyna,   Going to discuss this patient at  on Wednesday. Possibly surgery vs radiation.   What do they typically like ordered, beyond PFTs? On that note, I've ordered PFTs, wondering if you could help arrange?   Thanks! Jai

## 2024-04-12 NOTE — Clinical Note
Afternoon!  Just an FYI that I'm planning to present this patient at  this week (have been presenting all low stage lung cancers to have documented consensus on treatment). In the interim, I've ordered the chemo component of CRT and am awaiting insurance authorization. I anticipate it will take close to a week, and then will need to get him scheduled for his first infusion.   Thanks! Jai

## 2024-04-16 ENCOUNTER — HOSPITAL ENCOUNTER (OUTPATIENT)
Dept: RADIATION THERAPY | Facility: HOSPITAL | Age: 79
Discharge: HOME | End: 2024-04-16
Attending: STUDENT IN AN ORGANIZED HEALTH CARE EDUCATION/TRAINING PROGRAM
Payer: MEDICARE

## 2024-04-16 PROCEDURE — 77014 HC CT GUIDANCE RADIATION THERAPY FLDS PLACEMENT: CPT | Mod: TC | Performed by: RADIOLOGY

## 2024-04-16 PROCEDURE — 77263 THER RADIOLOGY TX PLNG CPLX: CPT | Mod: ,,, | Performed by: RADIOLOGY

## 2024-04-16 PROCEDURE — 77334 RADIATION TREATMENT AID(S): CPT | Mod: TC | Performed by: RADIOLOGY

## 2024-04-16 PROCEDURE — 77334 RADIATION TREATMENT AID(S): CPT | Mod: 26,,, | Performed by: RADIOLOGY

## 2024-04-16 PROCEDURE — 77014 PR  CT GUIDANCE PLACEMENT RAD THERAPY FIELDS: CPT | Mod: 26,,, | Performed by: RADIOLOGY

## 2024-04-22 ENCOUNTER — PATIENT MESSAGE (OUTPATIENT)
Dept: RADIATION ONCOLOGY | Facility: CLINIC | Age: 79
End: 2024-04-22
Payer: MEDICARE

## 2024-04-22 LAB — FUNGUS SPEC CULT: NORMAL

## 2024-04-25 ENCOUNTER — OFFICE VISIT (OUTPATIENT)
Dept: HEMATOLOGY/ONCOLOGY | Facility: CLINIC | Age: 79
End: 2024-04-25
Payer: MEDICARE

## 2024-04-25 ENCOUNTER — PATIENT MESSAGE (OUTPATIENT)
Dept: ADMINISTRATIVE | Facility: OTHER | Age: 79
End: 2024-04-25
Payer: MEDICARE

## 2024-04-25 VITALS
OXYGEN SATURATION: 96 % | HEIGHT: 68 IN | HEART RATE: 83 BPM | TEMPERATURE: 98 F | BODY MASS INDEX: 33.31 KG/M2 | DIASTOLIC BLOOD PRESSURE: 81 MMHG | WEIGHT: 219.81 LBS | SYSTOLIC BLOOD PRESSURE: 122 MMHG | RESPIRATION RATE: 14 BRPM

## 2024-04-25 DIAGNOSIS — C34.12 ADENOCARCINOMA OF UPPER LOBE OF LEFT LUNG: Primary | ICD-10-CM

## 2024-04-25 PROCEDURE — 99214 OFFICE O/P EST MOD 30 MIN: CPT | Mod: PBBFAC | Performed by: HOSPITALIST

## 2024-04-25 PROCEDURE — 99999 PR PBB SHADOW E&M-EST. PATIENT-LVL IV: CPT | Mod: PBBFAC,,, | Performed by: HOSPITALIST

## 2024-04-25 PROCEDURE — 99215 OFFICE O/P EST HI 40 MIN: CPT | Mod: S$PBB,,, | Performed by: HOSPITALIST

## 2024-04-25 NOTE — Clinical Note
EBUS on 4/30/24, I anticipate results by end of that week, so will aim for first dose early the week of 5/06. Does that work for you?   Thanks! Jai

## 2024-04-25 NOTE — Clinical Note
Good afternoon,   This patient is about to start treatment for his lung cancer. He is undergoing a bronchoscopy to complete staging on 4/30/24, but also has a cardiac PET scan the same day. Is it possible to push the cardiac PET back?   Thank you! Jai

## 2024-04-25 NOTE — PROGRESS NOTES
[facility] MEDICAL ONCOLOGY - FOLLOW UP VISIT    Reason for visit: Adenocarcinoma of the SANJAY      Oncology History   Adenocarcinoma of upper lobe of left lung   3/19/2024 Imaging Significant Findings    CTA PE (hemoptysis)  - 3.4 x 5.8 cm L hilar mass, probable invasion of distal L main bronchus and SANJAY bronchi     3/19/2024 - 3/22/2024 Hospital Admission    Presented with new onset hemoptysis.  Imaging demonstrated left hilar mass.  Bronchoscopy performed and confirmed adenocarcinoma, EGFR Exon 20 insertion.     3/21/2024 Procedure    Bronch w/ EBUS  SANJAY, Endobronchial Bx  - Adenocarcinoma (CK7 and TTF1 positive; CK20, p40, and CK 5/6 negative)    SANJAY, BAL  - Adenocarcinoma    TEMPUS NGS/PD-L1  - PD-L1 TPS: 35%  - EGFR exon 20 inframe insertion ([id number])  - TP53 misssense ([id number])  - Negative: EGFR, ALK, BRAF, KRAS, ROS1, RET, MET, ERBB2  - TMB 9.5 m/MB     4/3/2024 Imaging Significant Findings    MRI Brain  - 0.8 cm rim enhancing lesion in L thalamus, no significant mass effect      4/11/2024 Imaging Significant Findings    PET CT  - 4.8 x 2.3 cm  L hilar mass, SUV 14  - Adjacent pulmonary nodules, e.g. 1.2 cm  - No intrathoracic LAD     4/12/2024 Cancer Staged    Staging form: Lung, AJCC 8th Edition  - Clinical stage from 4/12/2024: Stage IIIA (cT3, cN1, cM0)     4/15/2024 -  Chemotherapy    Treatment Summary   Plan Name: OP NSCLC PACLITAXEL + CARBOPLATIN (AUC) QW + RADIATION  Treatment Goal: Curative  Status: Active  Start Date: 4/15/2024 (Planned)  End Date: 5/20/2024 (Planned)  Provider: Isacc Nuñez IV, MD  Chemotherapy: CARBOplatin (PARAPLATIN) in sodium chloride 0.9% 250 mL chemo infusion,  (original dose ), Intravenous, Clinic/HOD 1 time, 0 of 6 cycles  Dose modification:   (Cycle 1)  PACLitaxeL (TAXOL) 45 mg/m2 = 96 mg in sodium chloride 0.9% 250 mL chemo infusion, 45 mg/m2 = 96 mg, Intravenous, Clinic/HOD 1 time, 0 of 6 cycles            HPI:     Jordin RONDON  Tiffany Erwin is a 78 y.o. male with pmh significant for restless leg syndrome, COPD, CAD s/p stent (2013), HTN, HLD, obesity, GERD, hearing loss requiring hearing aids, and Stage IIIA (T3N1M0) adenocarcinoma of the L hilum (PD-L1 35%, EGFR exon 20 insertion), initially dx'd 3/21/24, currently treatment naive, who presents to [facility] care.     Interval History:  - 4/23/24: NS thoracic tumor board, plan for mediastinal sampling    Patient states that he feels well today.  He denies any new or worsening symptoms since his last visit.  He states that he is eager to begin treatment for his cancer.  He presents today with his daughter.    Past Medical History:   Past Medical History:   Diagnosis Date    Benign neoplasm of colon 10/01/2013    Bronchitis chronic     CAD (coronary artery disease) 10/31/2013    COPD (chronic obstructive pulmonary disease)     Emphysema of lung     GERD (gastroesophageal reflux disease)     Hx of colonic polyps     Hypertension     NAFLD (nonalcoholic fatty liver disease) 03/27/2017    SHOLA on CPAP     RLS (restless legs syndrome)     Screen for colon cancer 08/30/2013        Past Surgical History:   Past Surgical History:   Procedure Laterality Date    bilateral foot surgery  1993    CARDIAC CATHETERIZATION  2013    x1    CATARACT EXTRACTION, BILATERAL      COLON SURGERY  2013    Ace Mott MD    COLONOSCOPY N/A 3/21/2018    Procedure: COLONOSCOPY;  Surgeon: Terry Mott MD;  Location: [address]);  Service: Endoscopy;  Laterality: N/A;    COLONOSCOPY N/A 4/12/2019    Procedure: COLONOSCOPY;  Surgeon: John Mantilla MD;  Location: [address]);  Service: Endoscopy;  Laterality: N/A;    COLONOSCOPY N/A 5/31/2022    Procedure: COLONOSCOPY;  Surgeon: MEDINA Farris MD;  Location: [facility] ([address] [place] [address]);  Service: Endoscopy;  Laterality: N/A;  fully vacc-inst portal-tb    scrotal abscess removal          Family History:   Family History   Problem Relation Name Age of Onset     Heart disease Mother      Heart attack Mother      Hypertension Mother      No Known Problems Sister      No Known Problems Daughter 2     Asthma Neg Hx      Emphysema Neg Hx      Prostate cancer Neg Hx      Cirrhosis Neg Hx          Social History:   Social History     Tobacco Use    Smoking status: Former     Current packs/day: 0.00     Average packs/day: 1 pack/day for 40.0 years (40.0 ttl pk-yrs)     Types: Cigarettes     Start date: 8/15/1972     Quit date: 8/15/2012     Years since quittin.7     Passive exposure: Never    Smokeless tobacco: Never   Substance Use Topics    Alcohol use: Yes     Alcohol/week: 3.0 standard drinks of alcohol     Types: 3 Cans of beer per week     Comment: maybe 2-3  beers weekly        I have reviewed and updated the patient's past medical, surgical, family and social histories.      ROS:   As per HPI.     Allergies:   Review of patient's allergies indicates:   Allergen Reactions    Brilinta [ticagrelor] Itching    Lisinopril      Other reaction(s): cough    Metoprolol succinate Rash        Medications:   Current Outpatient Medications   Medication Sig Dispense Refill    albuterol (ACCUNEB) 0.63 mg/3 mL Nebu Take 3 mLs (0.63 mg total) by nebulization every 6 (six) hours as needed (if symptoms failed to improve with inhaler). Rescue 375 mL 11    albuterol (PROVENTIL/VENTOLIN HFA) 90 mcg/actuation inhaler Inhale 2 puffs into the lungs every 4 (four) hours as needed for Wheezing. 8.5 g 11    amitriptyline (ELAVIL) 25 MG tablet Take 1 tablet (25 mg total) by mouth once daily. 90 tablet 1    amLODIPine (NORVASC) 5 MG tablet Take 1 tablet (5 mg total) by mouth once daily. 90 tablet 3    aspirin (ECOTRIN) 81 MG EC tablet Take 81 mg by mouth once daily.       atorvastatin (LIPITOR) 80 MG tablet TAKE 1 TABLET (80 MG TOTAL) BY MOUTH ONCE DAILY. 90 tablet 3    benzonatate (TESSALON) 200 MG capsule Take 1 capsule (200 mg total) by mouth 3 (three) times daily as needed for Cough. 90  capsule 3    BETA-CAROTENE,A, W-C & E/MIN (OCUVITE ORAL) Take 1 capsule by mouth once daily.       budesonide-glycopyr-formoterol (BREZTRI AEROSPHERE) 160-9-4.8 mcg/actuation HFAA Inhale 2 puffs into the lungs 2 (two) times a day. 10.7 g 3    echinacea 400 mg Cap Take 1 capsule by mouth once daily.       fluticasone propionate (FLONASE) 50 mcg/actuation nasal spray 2 sprays (100 mcg total) by Each Nostril route once daily. 16 g 11    hydroCHLOROthiazide (HYDRODIURIL) 25 MG tablet Take 1 tablet (25 mg total) by mouth once daily. 90 tablet 3    ketoconazole (NIZORAL) 2 % cream Apply topically once daily. 30 g 1    latanoprost 0.005 % ophthalmic solution Place 1 drop into both eyes once daily.       losartan (COZAAR) 100 MG tablet Take 1 tablet (100 mg total) by mouth once daily. 90 tablet 2    nitroGLYCERIN (NITROSTAT) 0.4 MG SL tablet Place 1 tablet (0.4 mg total) under the tongue every 5 (five) minutes as needed. 100 tablet 3    omeprazole (PRILOSEC) 20 MG capsule Take 1 capsule (20 mg total) by mouth once daily. 90 capsule 3    predniSONE (DELTASONE) 5 MG tablet TAKE 1 TABLET BY MOUTH EVERY OTHER DAY. TAKE WITH FOOD 30 tablet 11    roflumilast (DALIRESP) 500 mcg Tab Take 1 tablet (500 mcg total) by mouth once daily. 90 tablet 3    urea (CARMOL) 40 % Crea Apply topically once daily. 85 g 10     No current facility-administered medications for this visit.          Physical Exam:       There were no vitals taken for this visit.               Physical Exam  Constitutional:       Appearance: Normal appearance.   HENT:      Head: Normocephalic and atraumatic.   Eyes:      Extraocular Movements: Extraocular movements intact.      Pupils: Pupils are equal, round, and reactive to light.   Pulmonary:      Effort: Pulmonary effort is normal.   Musculoskeletal:      Cervical back: Normal range of motion.   Skin:     Coloration: Skin is not jaundiced.   Neurological:      Mental Status: He is alert and oriented to person,  place, and time.   Psychiatric:         Mood and Affect: Mood normal.         Thought Content: Thought content normal.         Judgment: Judgment normal.           Labs:   No results found for this or any previous visit (from the past 48 hour(s)).       Imaging:    See oncologic history above.     Path:  See oncologic history above.      Assessment and Plan:     Jordin Allen Jr. is a 78 y.o. male with pmh significant for restless leg syndrome, COPD, CAD s/p stent (2013), HTN, HLD, obesity, GERD, hearing loss requiring hearing aids, and Stage IIIA (T3N1M0) adenocarcinoma of the L hilum (PD-L1 35%, EGFR exon 20 insertion), initially dx'd 3/21/24, currently treatment naive, who presents to [facility] care.     Stage IIIA Adenocarcinoma of L Hilum, EGFR Exon 20 insertion, PD-L1 35%  ECOG PS 1 (limited by MONTIEL).  Patient presents today for follow-up.  Since last visit, I discussed the patient with the surgical team who felt that the patient was a poor candidate due to his pulmonary function testing.  He is currently scheduled for a bronchoscopy for mediastinal staging on 04/30/2024.  Following this, he will receive CRT with 6 weekly doses of carboplatin/paclitaxel followed by 1 year of consolidative durvalumab per the PACIFIC trial.  Today, we discussed the rationale behind the mediastinal sampling her (NCCN guidelines, and to help define radiation field, espcially given extension towards the hilum by the primary mass).  We also discussed the logistics, general mechanism of action, and potential side effects (up to and including death) of weekly carboplatin/paclitaxel.  The patient endorses understanding and signed consent for these today.  Anticipate starting likely the week of 05/06/2024 following results of mediastinal sampling.    PLAN:   -- Bronch w/ EBUS for pathologic mediastinal staging on 4/30/24  -- Weekly carboplatin/paclitaxel ordered and approved, will tentatively request first dose week of 5/06/24  --  Labs and RTC w/ C1D1, will also discuss chemocare  at this visit  -- Message sent to radiation oncology  -- Message sent to Dr. Ba regarding canceling the cardiac PET on 4/30/24 as pt may be mid-treatment      The above information has been reviewed with the patient and all questions have been answered to their apparent satisfaction.  They understand that they can call the clinic with any questions.    Isacc Nuñez MD  Hematology/Oncology  [facility] [place]      Route Chart for Scheduling    Med Onc Chart Routing      Follow up with physician . Please schedule C1D1 of carboplatin/paclitaxel on 5/06, 5/07, or 5/08. If on M/T, then pt can meet with me virtually or with Zabrina in person.   Follow up with JARAD    Infusion scheduling note    Injection scheduling note    Labs CBC and CMP   Scheduling:  Preferred lab:  Lab interval:     Imaging    Pharmacy appointment    Other referrals                  Disclaimer: This note was prepared using voice recognition system and is likely to have sound alike errors and is not proof read.  Please call me with any questions.     *Staging updated above after further review of imaging, will discuss with patient at next appointment, treatment recommendations are not changed*

## 2024-04-26 ENCOUNTER — DOCUMENTATION ONLY (OUTPATIENT)
Dept: RADIATION ONCOLOGY | Facility: CLINIC | Age: 79
End: 2024-04-26
Payer: MEDICARE

## 2024-04-29 DIAGNOSIS — C34.12 ADENOCARCINOMA OF UPPER LOBE OF LEFT LUNG: Primary | ICD-10-CM

## 2024-04-29 NOTE — PROGRESS NOTES
Call to pt to inform to report to radiation for his first treatment on 5/8/24 at 7:45 AM. Pt scheduled for his first chemo at 11:30 same day. Pt verbalized understanding.

## 2024-04-30 ENCOUNTER — EXTERNAL CHRONIC CARE MANAGEMENT (OUTPATIENT)
Dept: PRIMARY CARE CLINIC | Facility: CLINIC | Age: 79
End: 2024-04-30
Payer: MEDICARE

## 2024-04-30 PROCEDURE — 99490 CHRNC CARE MGMT STAFF 1ST 20: CPT | Mod: PBBFAC,PO | Performed by: INTERNAL MEDICINE

## 2024-04-30 PROCEDURE — 99490 CHRNC CARE MGMT STAFF 1ST 20: CPT | Mod: S$PBB,,, | Performed by: INTERNAL MEDICINE

## 2024-05-01 ENCOUNTER — HOSPITAL ENCOUNTER (OUTPATIENT)
Dept: RADIATION THERAPY | Facility: HOSPITAL | Age: 79
Discharge: HOME | End: 2024-05-01
Attending: RADIOLOGY
Payer: MEDICARE

## 2024-05-02 DIAGNOSIS — C34.12 ADENOCARCINOMA OF UPPER LOBE OF LEFT LUNG: Primary | ICD-10-CM

## 2024-05-03 PROCEDURE — 77301 RADIOTHERAPY DOSE PLAN IMRT: CPT | Mod: TC | Performed by: RADIOLOGY

## 2024-05-03 PROCEDURE — 77301 RADIOTHERAPY DOSE PLAN IMRT: CPT | Mod: 26,,, | Performed by: RADIOLOGY

## 2024-05-06 ENCOUNTER — PATIENT MESSAGE (OUTPATIENT)
Dept: CARDIOLOGY | Facility: CLINIC | Age: 79
End: 2024-05-06
Payer: MEDICARE

## 2024-05-06 ENCOUNTER — PATIENT MESSAGE (OUTPATIENT)
Dept: PULMONOLOGY | Facility: CLINIC | Age: 79
End: 2024-05-06
Payer: MEDICARE

## 2024-05-06 ENCOUNTER — OFFICE VISIT (OUTPATIENT)
Dept: PODIATRY | Facility: CLINIC | Age: 79
End: 2024-05-06
Payer: MEDICARE

## 2024-05-06 VITALS
HEART RATE: 86 BPM | HEIGHT: 68 IN | SYSTOLIC BLOOD PRESSURE: 120 MMHG | WEIGHT: 218.94 LBS | BODY MASS INDEX: 33.18 KG/M2 | TEMPERATURE: 99 F | DIASTOLIC BLOOD PRESSURE: 76 MMHG

## 2024-05-06 DIAGNOSIS — M79.671 PAIN IN BOTH FEET: Primary | ICD-10-CM

## 2024-05-06 DIAGNOSIS — B35.1 TINEA UNGUIUM: ICD-10-CM

## 2024-05-06 DIAGNOSIS — M79.672 PAIN IN BOTH FEET: Primary | ICD-10-CM

## 2024-05-06 PROCEDURE — 99999 PR PBB SHADOW E&M-EST. PATIENT-LVL IV: CPT | Mod: PBBFAC,,, | Performed by: STUDENT IN AN ORGANIZED HEALTH CARE EDUCATION/TRAINING PROGRAM

## 2024-05-06 PROCEDURE — 77300 RADIATION THERAPY DOSE PLAN: CPT | Mod: 26,,, | Performed by: RADIOLOGY

## 2024-05-06 PROCEDURE — 77338 DESIGN MLC DEVICE FOR IMRT: CPT | Mod: 26,,, | Performed by: RADIOLOGY

## 2024-05-06 PROCEDURE — 99214 OFFICE O/P EST MOD 30 MIN: CPT | Mod: PBBFAC,25 | Performed by: STUDENT IN AN ORGANIZED HEALTH CARE EDUCATION/TRAINING PROGRAM

## 2024-05-06 PROCEDURE — 11720 DEBRIDE NAIL 1-5: CPT | Mod: PBBFAC | Performed by: STUDENT IN AN ORGANIZED HEALTH CARE EDUCATION/TRAINING PROGRAM

## 2024-05-06 PROCEDURE — 77338 DESIGN MLC DEVICE FOR IMRT: CPT | Mod: TC | Performed by: RADIOLOGY

## 2024-05-06 PROCEDURE — 77300 RADIATION THERAPY DOSE PLAN: CPT | Mod: TC | Performed by: RADIOLOGY

## 2024-05-06 PROCEDURE — 99499 UNLISTED E&M SERVICE: CPT | Mod: S$PBB,,, | Performed by: STUDENT IN AN ORGANIZED HEALTH CARE EDUCATION/TRAINING PROGRAM

## 2024-05-06 RX ORDER — NITROGLYCERIN 0.4 MG/1
0.4 TABLET SUBLINGUAL EVERY 5 MIN PRN
Qty: 100 TABLET | Refills: 3 | Status: SHIPPED | OUTPATIENT
Start: 2024-05-06

## 2024-05-06 NOTE — PROCEDURES
"Routine Foot Care    Date/Time: 5/6/2024 10:30 AM    Performed by: Garrett Lloyd DPM  Authorized by: Garrett Lloyd DPM    Time out: Immediately prior to procedure a "time out" was called to verify the correct patient, procedure, equipment, support staff and site/side marked as required.    Consent Done?:  Yes (Verbal)  Hyperkeratotic Skin Lesions?: No      Nail Care Type:  Debride(Left 2nd Toe and Right 2nd Toe)  Patient tolerance:  Patient tolerated the procedure well with no immediate complications     Ongoing nail pain as per prior progress note    RTC in 3 months for routine foot care    "

## 2024-05-07 DIAGNOSIS — J43.2 CENTRILOBULAR EMPHYSEMA: ICD-10-CM

## 2024-05-07 PROCEDURE — 77470 SPECIAL RADIATION TREATMENT: CPT | Mod: 59,TC | Performed by: RADIOLOGY

## 2024-05-07 PROCEDURE — 77470 SPECIAL RADIATION TREATMENT: CPT | Mod: 26,59,, | Performed by: RADIOLOGY

## 2024-05-07 RX ORDER — PREDNISONE 5 MG/1
TABLET ORAL
Qty: 30 TABLET | Refills: 11 | Status: CANCELLED | OUTPATIENT
Start: 2024-05-07

## 2024-05-08 PROCEDURE — 77386 HC IMRT, COMPLEX: CPT | Performed by: RADIOLOGY

## 2024-05-08 PROCEDURE — 77427 RADIATION TX MANAGEMENT X5: CPT | Mod: ,,, | Performed by: RADIOLOGY

## 2024-05-08 PROCEDURE — 77014 PR  CT GUIDANCE PLACEMENT RAD THERAPY FIELDS: CPT | Mod: 26,,, | Performed by: RADIOLOGY

## 2024-05-09 ENCOUNTER — OFFICE VISIT (OUTPATIENT)
Dept: HEMATOLOGY/ONCOLOGY | Facility: CLINIC | Age: 79
End: 2024-05-09
Payer: MEDICARE

## 2024-05-09 ENCOUNTER — INFUSION (OUTPATIENT)
Dept: INFUSION THERAPY | Facility: HOSPITAL | Age: 79
End: 2024-05-09
Payer: MEDICARE

## 2024-05-09 VITALS
BODY MASS INDEX: 33.41 KG/M2 | TEMPERATURE: 98 F | HEIGHT: 68 IN | RESPIRATION RATE: 14 BRPM | WEIGHT: 220.44 LBS | DIASTOLIC BLOOD PRESSURE: 85 MMHG | OXYGEN SATURATION: 98 % | SYSTOLIC BLOOD PRESSURE: 127 MMHG | HEART RATE: 84 BPM

## 2024-05-09 VITALS
HEART RATE: 78 BPM | RESPIRATION RATE: 16 BRPM | SYSTOLIC BLOOD PRESSURE: 128 MMHG | DIASTOLIC BLOOD PRESSURE: 78 MMHG | HEIGHT: 68 IN | TEMPERATURE: 98 F | WEIGHT: 220.44 LBS | OXYGEN SATURATION: 98 % | BODY MASS INDEX: 33.41 KG/M2

## 2024-05-09 DIAGNOSIS — C34.92 ADENOCARCINOMA, LUNG, LEFT: Primary | ICD-10-CM

## 2024-05-09 PROCEDURE — 96413 CHEMO IV INFUSION 1 HR: CPT

## 2024-05-09 PROCEDURE — 99215 OFFICE O/P EST HI 40 MIN: CPT | Mod: S$PBB,,, | Performed by: HOSPITALIST

## 2024-05-09 PROCEDURE — G6002 STEREOSCOPIC X-RAY GUIDANCE: HCPCS | Mod: 26,,, | Performed by: RADIOLOGY

## 2024-05-09 PROCEDURE — 77417 THER RADIOLOGY PORT IMAGE(S): CPT | Performed by: RADIOLOGY

## 2024-05-09 PROCEDURE — 96417 CHEMO IV INFUS EACH ADDL SEQ: CPT

## 2024-05-09 PROCEDURE — 77386 HC IMRT, COMPLEX: CPT | Performed by: RADIOLOGY

## 2024-05-09 PROCEDURE — 63600175 PHARM REV CODE 636 W HCPCS: Performed by: HOSPITALIST

## 2024-05-09 PROCEDURE — 96375 TX/PRO/DX INJ NEW DRUG ADDON: CPT

## 2024-05-09 PROCEDURE — 99214 OFFICE O/P EST MOD 30 MIN: CPT | Mod: PBBFAC,25 | Performed by: HOSPITALIST

## 2024-05-09 PROCEDURE — G2211 COMPLEX E/M VISIT ADD ON: HCPCS | Mod: S$PBB,,, | Performed by: HOSPITALIST

## 2024-05-09 PROCEDURE — 99999 PR PBB SHADOW E&M-EST. PATIENT-LVL IV: CPT | Mod: PBBFAC,,, | Performed by: HOSPITALIST

## 2024-05-09 PROCEDURE — 96367 TX/PROPH/DG ADDL SEQ IV INF: CPT

## 2024-05-09 PROCEDURE — 25000003 PHARM REV CODE 250: Performed by: HOSPITALIST

## 2024-05-09 RX ORDER — PROCHLORPERAZINE MALEATE 5 MG
5 TABLET ORAL EVERY 6 HOURS PRN
Qty: 30 TABLET | Refills: 1 | Status: SHIPPED | OUTPATIENT
Start: 2024-05-09 | End: 2024-05-17 | Stop reason: SDUPTHER

## 2024-05-09 RX ORDER — EPINEPHRINE 0.3 MG/.3ML
0.3 INJECTION SUBCUTANEOUS ONCE AS NEEDED
Status: CANCELLED | OUTPATIENT
Start: 2024-05-09

## 2024-05-09 RX ORDER — OLANZAPINE 5 MG/1
5 TABLET ORAL NIGHTLY
Qty: 18 TABLET | Refills: 0 | Status: SHIPPED | OUTPATIENT
Start: 2024-05-09

## 2024-05-09 RX ORDER — PROCHLORPERAZINE EDISYLATE 5 MG/ML
5 INJECTION INTRAMUSCULAR; INTRAVENOUS ONCE AS NEEDED
Status: CANCELLED
Start: 2024-05-09

## 2024-05-09 RX ORDER — FAMOTIDINE 10 MG/ML
20 INJECTION INTRAVENOUS
Status: CANCELLED | OUTPATIENT
Start: 2024-05-09

## 2024-05-09 RX ORDER — DIPHENHYDRAMINE HYDROCHLORIDE 50 MG/ML
50 INJECTION INTRAMUSCULAR; INTRAVENOUS ONCE AS NEEDED
Status: DISCONTINUED | OUTPATIENT
Start: 2024-05-09 | End: 2024-05-09 | Stop reason: HOSPADM

## 2024-05-09 RX ORDER — OLANZAPINE 5 MG/1
5 TABLET ORAL NIGHTLY
Qty: 18 TABLET | Refills: 0 | Status: SHIPPED | OUTPATIENT
Start: 2024-05-09 | End: 2024-05-09

## 2024-05-09 RX ORDER — SODIUM CHLORIDE 0.9 % (FLUSH) 0.9 %
10 SYRINGE (ML) INJECTION
Status: CANCELLED | OUTPATIENT
Start: 2024-05-09

## 2024-05-09 RX ORDER — FAMOTIDINE 10 MG/ML
20 INJECTION INTRAVENOUS
Status: COMPLETED | OUTPATIENT
Start: 2024-05-09 | End: 2024-05-09

## 2024-05-09 RX ORDER — SODIUM CHLORIDE 0.9 % (FLUSH) 0.9 %
10 SYRINGE (ML) INJECTION
Status: DISCONTINUED | OUTPATIENT
Start: 2024-05-09 | End: 2024-05-09 | Stop reason: HOSPADM

## 2024-05-09 RX ORDER — HEPARIN 100 UNIT/ML
500 SYRINGE INTRAVENOUS
Status: DISCONTINUED | OUTPATIENT
Start: 2024-05-09 | End: 2024-05-09 | Stop reason: HOSPADM

## 2024-05-09 RX ORDER — EPINEPHRINE 0.3 MG/.3ML
0.3 INJECTION SUBCUTANEOUS ONCE AS NEEDED
Status: DISCONTINUED | OUTPATIENT
Start: 2024-05-09 | End: 2024-05-09 | Stop reason: HOSPADM

## 2024-05-09 RX ORDER — PROCHLORPERAZINE EDISYLATE 5 MG/ML
5 INJECTION INTRAMUSCULAR; INTRAVENOUS ONCE AS NEEDED
Status: DISCONTINUED | OUTPATIENT
Start: 2024-05-09 | End: 2024-05-09 | Stop reason: HOSPADM

## 2024-05-09 RX ORDER — PROCHLORPERAZINE MALEATE 5 MG
5 TABLET ORAL EVERY 6 HOURS PRN
Qty: 30 TABLET | Refills: 1 | Status: SHIPPED | OUTPATIENT
Start: 2024-05-09 | End: 2024-05-09

## 2024-05-09 RX ORDER — DIPHENHYDRAMINE HYDROCHLORIDE 50 MG/ML
50 INJECTION INTRAMUSCULAR; INTRAVENOUS ONCE AS NEEDED
Status: CANCELLED | OUTPATIENT
Start: 2024-05-09

## 2024-05-09 RX ORDER — HEPARIN 100 UNIT/ML
500 SYRINGE INTRAVENOUS
Status: CANCELLED | OUTPATIENT
Start: 2024-05-09

## 2024-05-09 RX ADMIN — DIPHENHYDRAMINE HYDROCHLORIDE 50 MG: 50 INJECTION, SOLUTION INTRAMUSCULAR; INTRAVENOUS at 10:05

## 2024-05-09 RX ADMIN — SODIUM CHLORIDE: 9 INJECTION, SOLUTION INTRAVENOUS at 10:05

## 2024-05-09 RX ADMIN — PACLITAXEL 96 MG: 6 INJECTION, SOLUTION, CONCENTRATE INTRAVENOUS at 11:05

## 2024-05-09 RX ADMIN — FAMOTIDINE 20 MG: 10 INJECTION INTRAVENOUS at 10:05

## 2024-05-09 RX ADMIN — DEXAMETHASONE SODIUM PHOSPHATE 0.25 MG: 4 INJECTION, SOLUTION INTRA-ARTICULAR; INTRALESIONAL; INTRAMUSCULAR; INTRAVENOUS; SOFT TISSUE at 10:05

## 2024-05-09 RX ADMIN — CARBOPLATIN 225 MG: 10 INJECTION, SOLUTION INTRAVENOUS at 01:05

## 2024-05-09 NOTE — PLAN OF CARE
Pt admitted for C1 D1 Taxol/Carbo. Labs reviewed and side effects and self care tips discussed. Education continued throughout treatment. Pt tolerated treatment well. AVS and calendar of future appt given to patient. PIV removed and Pt discharged home

## 2024-05-09 NOTE — PROGRESS NOTES
[facility] MEDICAL ONCOLOGY - FOLLOW UP VISIT    Reason for visit: Adenocarcinoma of the SANJAY      Oncology History   Adenocarcinoma, lung, left   3/19/2024 Imaging Significant Findings    CTA PE (hemoptysis)  - 3.4 x 5.8 cm L hilar mass, probable invasion of distal L main bronchus and SANJAY bronchi     3/19/2024 - 3/22/2024 Hospital Admission    Presented with new onset hemoptysis.  Imaging demonstrated left hilar mass.  Bronchoscopy performed and confirmed adenocarcinoma, EGFR Exon 20 insertion.     3/21/2024 Procedure    Bronch w/ EBUS  SANJAY, Endobronchial Bx  - Adenocarcinoma (CK7 and TTF1 positive; CK20, p40, and CK 5/6 negative)    SANJAY, BAL  - Adenocarcinoma    TEMPUS NGS/PD-L1  - PD-L1 TPS: 35%  - EGFR exon 20 inframe insertion ([id number])  - TP53 misssense (V157F)  - Negative: EGFR, ALK, BRAF, KRAS, ROS1, RET, MET, ERBB2  - TMB 9.5 m/MB     4/3/2024 Imaging Significant Findings    MRI Brain  - 0.8 cm rim enhancing lesion in L thalamus, no significant mass effect      4/11/2024 Imaging Significant Findings    PET CT  - 4.8 x 2.3 cm  L hilar mass, SUV 14  - Adjacent pulmonary nodules, e.g. 1.2 cm  - No intrathoracic LAD     4/12/2024 Cancer Staged    Staging form: Lung, AJCC 8th Edition  - Clinical stage from 4/12/2024: Stage IIIA (cT3, cN1, cM0)     4/15/2024 -  Chemotherapy    Treatment Summary   Plan Name: OP NSCLC PACLITAXEL + CARBOPLATIN (AUC) QW + RADIATION  Treatment Goal: Curative  Status: Active  Start Date: 4/15/2024 (Planned)  End Date: 5/20/2024 (Planned)  Provider: Isacc Nuñez IV, MD  Chemotherapy: CARBOplatin (PARAPLATIN) in sodium chloride 0.9% 250 mL chemo infusion,  (original dose ), Intravenous, Clinic/HOD 1 time, 0 of 6 cycles  Dose modification:   (Cycle 1)  PACLitaxeL (TAXOL) 45 mg/m2 = 96 mg in sodium chloride 0.9% 250 mL chemo infusion, 45 mg/m2 = 96 mg, Intravenous, Clinic/HOD 1 time, 0 of 6 cycles     4/30/2024 Procedure    Bronch with  "EBUS  RUL, EBBx  - Adenocarcinoma (TTF-1 positive, p40 negative)    LN  - Adenocarcinoma    Procedure Note  - Station for L was normal-sized not sampled; no other lymph nodes were visible by EBUS, including station 7, 4R, and 11R            HPI:     Jordin Allen Jr. is a 78 y.o. male with pmh significant for restless leg syndrome, COPD, CAD s/p stent (2013), HTN, HLD, obesity, GERD, hearing loss requiring hearing aids, and Stage IIIA (T3N1M0) adenocarcinoma of the L hilum (PD-L1 35%, EGFR exon 20 insertion), initially dx'd 3/21/24, currently on CRT (5/8/24-present) with weekly carboplatin/paclitaxel (5/9/24-present), who presents for follow-up.     Last clinic 4/25/24, bronch with EBUS on 04/30, plan for CRT starting week of 5/6/24, discussed chemocare  at next visit.    Interval History:  - 4/30/24: Bronch with EBUS, and 11R positive for adenocarcinoma  - Radiation start    Pt states he started radiation therapy yesterday (5/8/24). He says that he feels well today. He feels that his breathing is "fine", though notes that he feels a "tad short" after carrying a large bag back and forth from radiation therapy today (it includes his portable concentrator). He denies new/worsening cough, or other new or bothersome symptoms. He drove himself today. He is remains able to complete all ADLs and iADLs without assistance.     Past Medical History:   Past Medical History:   Diagnosis Date    Benign neoplasm of colon 10/01/2013    Bronchitis chronic     CAD (coronary artery disease) 10/31/2013    COPD (chronic obstructive pulmonary disease)     Emphysema of lung     GERD (gastroesophageal reflux disease)     Hx of colonic polyps     Hypertension     NAFLD (nonalcoholic fatty liver disease) 03/27/2017    SHOLA on CPAP     RLS (restless legs syndrome)     Screen for colon cancer 08/30/2013        Past Surgical History:   Past Surgical History:   Procedure Laterality Date    bilateral foot surgery  1993    CARDIAC " CATHETERIZATION  2013    x1    CATARACT EXTRACTION, BILATERAL      COLON SURGERY      Ace Mott MD    COLONOSCOPY N/A 3/21/2018    Procedure: COLONOSCOPY;  Surgeon: Terry Mott MD;  Location: [facility] (4TH FLR);  Service: Endoscopy;  Laterality: N/A;    COLONOSCOPY N/A 2019    Procedure: COLONOSCOPY;  Surgeon: John Mantilla MD;  Location: [facility] ENDO (4TH FLR);  Service: Endoscopy;  Laterality: N/A;    COLONOSCOPY N/A 2022    Procedure: COLONOSCOPY;  Surgeon: MEDINA Farris MD;  Location: [facility] ENDO (4TH FLR);  Service: Endoscopy;  Laterality: N/A;  fully vacc-inst portal-tb    ENDOBRONCHIAL ULTRASOUND Bilateral 2024    Procedure: ENDOBRONCHIAL ULTRASOUND (EBUS);  Surgeon: Naveed Aguero MD;  Location: [facility] OR;  Service: Pulmonary;  Laterality: Bilateral;    scrotal abscess removal          Family History:   Family History   Problem Relation Name Age of Onset    Heart disease Mother      Heart attack Mother      Hypertension Mother      No Known Problems Sister      No Known Problems Daughter 2     Asthma Neg Hx      Emphysema Neg Hx      Prostate cancer Neg Hx      Cirrhosis Neg Hx          Social History:   Social History     Tobacco Use    Smoking status: Former     Current packs/day: 0.00     Average packs/day: 1 pack/day for 40.0 years (40.0 ttl pk-yrs)     Types: Cigarettes     Start date: 8/15/1972     Quit date: 8/15/2012     Years since quittin.7     Passive exposure: Never    Smokeless tobacco: Never   Substance Use Topics    Alcohol use: Yes     Alcohol/week: 3.0 standard drinks of alcohol     Types: 3 Cans of beer per week     Comment: maybe 2-3  beers weekly        I have reviewed and updated the patient's past medical, surgical, family and social histories.      ROS:   As per HPI.     Allergies:   Review of patient's allergies indicates:   Allergen Reactions    Brilinta [ticagrelor] Itching    Lisinopril      Other reaction(s): cough    Metoprolol succinate Rash         Medications:   Current Outpatient Medications   Medication Sig Dispense Refill    albuterol (ACCUNEB) 0.63 mg/3 mL Nebu Take 3 mLs (0.63 mg total) by nebulization every 6 (six) hours as needed (if symptoms failed to improve with inhaler). Rescue 375 mL 11    albuterol (PROVENTIL/VENTOLIN HFA) 90 mcg/actuation inhaler Inhale 2 puffs into the lungs every 4 (four) hours as needed for Wheezing. 8.5 g 11    amitriptyline (ELAVIL) 25 MG tablet Take 1 tablet (25 mg total) by mouth once daily. 90 tablet 1    amLODIPine (NORVASC) 5 MG tablet Take 1 tablet (5 mg total) by mouth once daily. 90 tablet 3    aspirin (ECOTRIN) 81 MG EC tablet Take 81 mg by mouth once daily.       atorvastatin (LIPITOR) 80 MG tablet TAKE 1 TABLET (80 MG TOTAL) BY MOUTH ONCE DAILY. (Patient taking differently: every evening. TAKE 1 TABLET (80 MG TOTAL) BY MOUTH ONCE DAILY.) 90 tablet 3    BETA-CAROTENE,A, W-C & E/MIN (OCUVITE ORAL) Take 1 capsule by mouth once daily.       budesonide-glycopyr-formoterol (BREZTRI AEROSPHERE) 160-9-4.8 mcg/actuation HFAA Inhale 2 puffs into the lungs 2 (two) times a day. 10.7 g 3    echinacea 400 mg Cap Take 1 capsule by mouth once daily.       fluticasone propionate (FLONASE) 50 mcg/actuation nasal spray 2 sprays (100 mcg total) by Each Nostril route once daily. 16 g 11    hydroCHLOROthiazide (HYDRODIURIL) 25 MG tablet Take 1 tablet (25 mg total) by mouth once daily. 90 tablet 3    ketoconazole (NIZORAL) 2 % cream Apply topically once daily. 30 g 1    latanoprost 0.005 % ophthalmic solution Place 1 drop into both eyes once daily.       latanoprost 0.005 % ophthalmic solution INSTILL 1 DROP INTO BOTH EYES AT BEDTIME 7.5 mL 3    losartan (COZAAR) 100 MG tablet Take 1 tablet (100 mg total) by mouth once daily. 90 tablet 2    nitroGLYCERIN (NITROSTAT) 0.4 MG SL tablet Place 1 tablet (0.4 mg total) under the tongue every 5 (five) minutes as needed. 100 tablet 3    omeprazole (PRILOSEC) 20 MG capsule Take 1 capsule  "(20 mg total) by mouth once daily. 90 capsule 3    predniSONE (DELTASONE) 5 MG tablet TAKE 1 TABLET BY MOUTH EVERY OTHER DAY. TAKE WITH FOOD 30 tablet 11    roflumilast (DALIRESP) 500 mcg Tab Take 1 tablet (500 mcg total) by mouth once daily. 90 tablet 3    urea (CARMOL) 40 % Crea Apply topically once daily. 85 g 10    benzonatate (TESSALON) 200 MG capsule Take 1 capsule (200 mg total) by mouth 3 (three) times daily as needed for Cough. 90 capsule 3     No current facility-administered medications for this visit.     Facility-Administered Medications Ordered in Other Visits   Medication Dose Route Frequency Provider Last Rate Last Admin    dextrose 50% injection 12.5 g  12.5 g Intravenous PRN Lidia Deleon MD        dextrose 50% injection 25 g  25 g Intravenous PRN Lidia Deleon MD        insulin regular injection 0-8 Units  0-8 Units Intravenous Continuous PRN Lidia Deleon MD              Physical Exam:       Ht 5' 8" (1.727 m)   BMI 33.29 kg/m²                Physical Exam  Constitutional:       Appearance: Normal appearance. He is obese.   HENT:      Head: Normocephalic and atraumatic.   Eyes:      Extraocular Movements: Extraocular movements intact.      Conjunctiva/sclera: Conjunctivae normal.      Pupils: Pupils are equal, round, and reactive to light.   Cardiovascular:      Rate and Rhythm: Normal rate and regular rhythm.      Heart sounds: No murmur heard.     No friction rub. No gallop.   Pulmonary:      Effort: Pulmonary effort is normal.      Breath sounds: No wheezing, rhonchi or rales.   Musculoskeletal:         General: Normal range of motion.      Right lower leg: No edema.      Left lower leg: No edema.   Skin:     General: Skin is warm and dry.      Comments: Bruising over bilateral dorsa of hands   Neurological:      Mental Status: He is alert and oriented to person, place, and time.   Psychiatric:         Mood and Affect: Mood normal.         Thought Content: Thought content " normal.         Judgment: Judgment normal.           Labs:   Recent Results (from the past 48 hour(s))   CBC w/ DIFF    Collection Time: 05/09/24  7:43 AM   Result Value Ref Range    WBC 9.70 3.90 - 12.70 K/uL    RBC 4.90 4.60 - 6.20 M/uL    Hemoglobin 12.7 (L) 14.0 - 18.0 g/dL    Hematocrit 40.8 40.0 - 54.0 %    MCV 83 82 - 98 fL    MCH 25.9 (L) 27.0 - 31.0 pg    MCHC 31.1 (L) 32.0 - 36.0 g/dL    RDW 15.5 (H) 11.5 - 14.5 %    Platelets 150 150 - 450 K/uL    MPV 10.8 9.2 - 12.9 fL    Immature Granulocytes 0.6 (H) 0.0 - 0.5 %    Gran # (ANC) 6.5 1.8 - 7.7 K/uL    Immature Grans (Abs) 0.06 (H) 0.00 - 0.04 K/uL    Lymph # 1.9 1.0 - 4.8 K/uL    Mono # 0.8 0.3 - 1.0 K/uL    Eos # 0.4 0.0 - 0.5 K/uL    Baso # 0.06 0.00 - 0.20 K/uL    nRBC 0 0 /100 WBC    Gran % 67.1 38.0 - 73.0 %    Lymph % 19.5 18.0 - 48.0 %    Mono % 8.2 4.0 - 15.0 %    Eosinophil % 4.0 0.0 - 8.0 %    Basophil % 0.6 0.0 - 1.9 %    Differential Method Automated    CMP    Collection Time: 05/09/24  7:43 AM   Result Value Ref Range    Sodium 140 136 - 145 mmol/L    Potassium 3.9 3.5 - 5.1 mmol/L    Chloride 104 95 - 110 mmol/L    CO2 29 23 - 29 mmol/L    Glucose 124 (H) 70 - 110 mg/dL    BUN 16 8 - 23 mg/dL    Creatinine 1.0 0.5 - 1.4 mg/dL    Calcium 9.8 8.7 - 10.5 mg/dL    Total Protein 6.7 6.0 - 8.4 g/dL    Albumin 3.5 3.5 - 5.2 g/dL    Total Bilirubin 1.2 (H) 0.1 - 1.0 mg/dL    Alkaline Phosphatase 109 55 - 135 U/L    AST 16 10 - 40 U/L    ALT 22 10 - 44 U/L    eGFR >60.0 >60 mL/min/1.73 m^2    Anion Gap 7 (L) 8 - 16 mmol/L          Imaging:    See oncologic history above.     Path:  See oncologic history above.      Assessment and Plan:     Jordin Allen  is a 78 y.o. male with pmh significant for restless leg syndrome, COPD, CAD s/p stent (2013), HTN, HLD, obesity, GERD, hearing loss requiring hearing aids, and Stage IIIA (T3N1M0) adenocarcinoma of the L hilum (PD-L1 35%, EGFR exon 20 insertion), initially dx'd 3/21/24, currently on CRT  (5/8/24-present) with weekly carboplatin/paclitaxel (5/9/24-present), who presents for follow-up.     Stage IIIA Adenocarcinoma of L Hilum, EGFR Exon 20 insertion, PD-L1 35%  ECOG PS 1 (limited by MONTIEL).  Patient presents today advanced starting weekly carboplatin/paclitaxel, noting that he started radiation therapy yesterday (5/8/24).  Since last visit, a bronchoscopy with EBUS revealed disease in the 11R but no other lymph nodes.  Most recent imaging is a PET-CT from 4/11/24.  Plan to proceed with 6 cycles of weekly carboplatin/paclitaxel in concert with radiation therapy, followed by 1 year immunotherapy maintenance.    PLAN:   -- Proceed with C1D1 carboplatin/paclitaxel today (5/9/24), labs acceptable and treatment signed  -- Antiemetics discussed and sent to pharmacy  -- Labs, RTC, and C2D1 on 5/16/24 (patient prefers labs same day his visit; though note, radiation is generally scheduled in the morning for him)  -- Chemocare enrollment today      The above information has been reviewed with the patient and all questions have been answered to their apparent satisfaction.  They understand that they can call the clinic with any questions.    Isacc Nuñez MD  Hematology/Oncology  [facility]      Route Chart for Scheduling    Med Onc Chart Routing      Follow up with physician . Labs, RTC, and C2D1 on 5/16/24.  Patient prefers appointments and infusions in the mornings given he is also scheduled for radiation therapy early in the mornings.   Follow up with JARAD    Infusion scheduling note    Injection scheduling note    Labs    Imaging    Pharmacy appointment    Other referrals                  Disclaimer: This note was prepared using voice recognition system and is likely to have sound alike errors and is not proof read.  Please call me with any questions.     *Staging updated above after further review of imaging, will discuss with patient at next appointment, treatment recommendations are  not changed*

## 2024-05-10 PROCEDURE — G6002 STEREOSCOPIC X-RAY GUIDANCE: HCPCS | Mod: 26,,, | Performed by: RADIOLOGY

## 2024-05-10 PROCEDURE — 77386 HC IMRT, COMPLEX: CPT | Performed by: RADIOLOGY

## 2024-05-13 PROCEDURE — G6002 STEREOSCOPIC X-RAY GUIDANCE: HCPCS | Mod: 26,,, | Performed by: RADIOLOGY

## 2024-05-13 PROCEDURE — 77386 HC IMRT, COMPLEX: CPT | Performed by: RADIOLOGY

## 2024-05-14 ENCOUNTER — DOCUMENTATION ONLY (OUTPATIENT)
Dept: RADIATION ONCOLOGY | Facility: CLINIC | Age: 79
End: 2024-05-14
Payer: MEDICARE

## 2024-05-14 PROCEDURE — 77014 PR  CT GUIDANCE PLACEMENT RAD THERAPY FIELDS: CPT | Mod: 26,,, | Performed by: RADIOLOGY

## 2024-05-14 PROCEDURE — 77386 HC IMRT, COMPLEX: CPT | Performed by: RADIOLOGY

## 2024-05-14 PROCEDURE — 77336 RADIATION PHYSICS CONSULT: CPT | Performed by: RADIOLOGY

## 2024-05-14 NOTE — PLAN OF CARE
Day 5 of outpatient radiation to the left lung. Pt receiving concurrent Paclitaxel+Carboplatin weekly with radiation. Tolerating treatment well. Nursing assessment completed. Lung handout given.

## 2024-05-15 PROCEDURE — 77427 RADIATION TX MANAGEMENT X5: CPT | Mod: ,,, | Performed by: RADIOLOGY

## 2024-05-15 PROCEDURE — 77386 HC IMRT, COMPLEX: CPT | Performed by: RADIOLOGY

## 2024-05-15 PROCEDURE — G6002 STEREOSCOPIC X-RAY GUIDANCE: HCPCS | Mod: 26,,, | Performed by: RADIOLOGY

## 2024-05-16 PROCEDURE — 77014 PR  CT GUIDANCE PLACEMENT RAD THERAPY FIELDS: CPT | Mod: 26,,, | Performed by: RADIOLOGY

## 2024-05-16 PROCEDURE — 77386 HC IMRT, COMPLEX: CPT | Performed by: RADIOLOGY

## 2024-05-17 ENCOUNTER — OFFICE VISIT (OUTPATIENT)
Dept: HEMATOLOGY/ONCOLOGY | Facility: CLINIC | Age: 79
End: 2024-05-17
Payer: MEDICARE

## 2024-05-17 ENCOUNTER — DOCUMENTATION ONLY (OUTPATIENT)
Dept: AUDIOLOGY | Facility: CLINIC | Age: 79
End: 2024-05-17
Payer: MEDICARE

## 2024-05-17 ENCOUNTER — INFUSION (OUTPATIENT)
Dept: INFUSION THERAPY | Facility: HOSPITAL | Age: 79
End: 2024-05-17
Payer: MEDICARE

## 2024-05-17 VITALS
RESPIRATION RATE: 14 BRPM | WEIGHT: 224 LBS | SYSTOLIC BLOOD PRESSURE: 120 MMHG | DIASTOLIC BLOOD PRESSURE: 72 MMHG | HEIGHT: 68 IN | OXYGEN SATURATION: 98 % | BODY MASS INDEX: 33.95 KG/M2 | TEMPERATURE: 98 F | HEART RATE: 70 BPM

## 2024-05-17 VITALS
TEMPERATURE: 98 F | HEIGHT: 68 IN | WEIGHT: 224 LBS | BODY MASS INDEX: 33.95 KG/M2 | SYSTOLIC BLOOD PRESSURE: 131 MMHG | RESPIRATION RATE: 16 BRPM | DIASTOLIC BLOOD PRESSURE: 71 MMHG | OXYGEN SATURATION: 98 % | HEART RATE: 65 BPM

## 2024-05-17 DIAGNOSIS — C34.92 ADENOCARCINOMA, LUNG, LEFT: Primary | ICD-10-CM

## 2024-05-17 DIAGNOSIS — C34.92 ADENOCARCINOMA, LUNG, LEFT: ICD-10-CM

## 2024-05-17 PROCEDURE — 25000003 PHARM REV CODE 250: Performed by: HOSPITALIST

## 2024-05-17 PROCEDURE — G2211 COMPLEX E/M VISIT ADD ON: HCPCS | Mod: S$PBB,,, | Performed by: HOSPITALIST

## 2024-05-17 PROCEDURE — G6002 STEREOSCOPIC X-RAY GUIDANCE: HCPCS | Mod: 26,,, | Performed by: RADIOLOGY

## 2024-05-17 PROCEDURE — 96367 TX/PROPH/DG ADDL SEQ IV INF: CPT

## 2024-05-17 PROCEDURE — 99215 OFFICE O/P EST HI 40 MIN: CPT | Mod: PBBFAC,25 | Performed by: HOSPITALIST

## 2024-05-17 PROCEDURE — 77386 HC IMRT, COMPLEX: CPT | Performed by: RADIOLOGY

## 2024-05-17 PROCEDURE — 96413 CHEMO IV INFUSION 1 HR: CPT

## 2024-05-17 PROCEDURE — 99999 PR PBB SHADOW E&M-EST. PATIENT-LVL V: CPT | Mod: PBBFAC,,, | Performed by: HOSPITALIST

## 2024-05-17 PROCEDURE — 77417 THER RADIOLOGY PORT IMAGE(S): CPT | Performed by: RADIOLOGY

## 2024-05-17 PROCEDURE — 63600175 PHARM REV CODE 636 W HCPCS: Performed by: HOSPITALIST

## 2024-05-17 PROCEDURE — 99215 OFFICE O/P EST HI 40 MIN: CPT | Mod: S$PBB,,, | Performed by: HOSPITALIST

## 2024-05-17 PROCEDURE — 96375 TX/PRO/DX INJ NEW DRUG ADDON: CPT

## 2024-05-17 RX ORDER — PROCHLORPERAZINE EDISYLATE 5 MG/ML
5 INJECTION INTRAMUSCULAR; INTRAVENOUS ONCE AS NEEDED
Status: DISCONTINUED | OUTPATIENT
Start: 2024-05-17 | End: 2024-05-17 | Stop reason: HOSPADM

## 2024-05-17 RX ORDER — FAMOTIDINE 10 MG/ML
20 INJECTION INTRAVENOUS
Status: CANCELLED | OUTPATIENT
Start: 2024-05-17

## 2024-05-17 RX ORDER — SODIUM CHLORIDE 0.9 % (FLUSH) 0.9 %
10 SYRINGE (ML) INJECTION
Status: CANCELLED | OUTPATIENT
Start: 2024-05-17

## 2024-05-17 RX ORDER — EPINEPHRINE 0.3 MG/.3ML
0.3 INJECTION SUBCUTANEOUS ONCE AS NEEDED
Status: CANCELLED | OUTPATIENT
Start: 2024-05-17

## 2024-05-17 RX ORDER — SODIUM CHLORIDE 0.9 % (FLUSH) 0.9 %
10 SYRINGE (ML) INJECTION
Status: DISCONTINUED | OUTPATIENT
Start: 2024-05-17 | End: 2024-05-17 | Stop reason: HOSPADM

## 2024-05-17 RX ORDER — EPINEPHRINE 0.3 MG/.3ML
0.3 INJECTION SUBCUTANEOUS ONCE AS NEEDED
Status: DISCONTINUED | OUTPATIENT
Start: 2024-05-17 | End: 2024-05-17 | Stop reason: HOSPADM

## 2024-05-17 RX ORDER — DIPHENHYDRAMINE HYDROCHLORIDE 50 MG/ML
50 INJECTION INTRAMUSCULAR; INTRAVENOUS ONCE AS NEEDED
Status: DISCONTINUED | OUTPATIENT
Start: 2024-05-17 | End: 2024-05-17 | Stop reason: HOSPADM

## 2024-05-17 RX ORDER — DIPHENHYDRAMINE HYDROCHLORIDE 50 MG/ML
50 INJECTION INTRAMUSCULAR; INTRAVENOUS ONCE AS NEEDED
Status: CANCELLED | OUTPATIENT
Start: 2024-05-17

## 2024-05-17 RX ORDER — PROCHLORPERAZINE EDISYLATE 5 MG/ML
5 INJECTION INTRAMUSCULAR; INTRAVENOUS ONCE AS NEEDED
Status: CANCELLED
Start: 2024-05-17

## 2024-05-17 RX ORDER — FAMOTIDINE 10 MG/ML
20 INJECTION INTRAVENOUS
Status: COMPLETED | OUTPATIENT
Start: 2024-05-17 | End: 2024-05-17

## 2024-05-17 RX ORDER — HEPARIN 100 UNIT/ML
500 SYRINGE INTRAVENOUS
Status: CANCELLED | OUTPATIENT
Start: 2024-05-17

## 2024-05-17 RX ORDER — PROCHLORPERAZINE MALEATE 5 MG
5 TABLET ORAL EVERY 6 HOURS PRN
Qty: 30 TABLET | Refills: 1 | Status: SHIPPED | OUTPATIENT
Start: 2024-05-17

## 2024-05-17 RX ORDER — HEPARIN 100 UNIT/ML
500 SYRINGE INTRAVENOUS
Status: DISCONTINUED | OUTPATIENT
Start: 2024-05-17 | End: 2024-05-17 | Stop reason: HOSPADM

## 2024-05-17 RX ADMIN — SODIUM CHLORIDE: 9 INJECTION, SOLUTION INTRAVENOUS at 10:05

## 2024-05-17 RX ADMIN — FAMOTIDINE 20 MG: 10 INJECTION INTRAVENOUS at 11:05

## 2024-05-17 RX ADMIN — DIPHENHYDRAMINE HYDROCHLORIDE 50 MG: 50 INJECTION, SOLUTION INTRAMUSCULAR; INTRAVENOUS at 11:05

## 2024-05-17 RX ADMIN — DEXAMETHASONE SODIUM PHOSPHATE 0.25 MG: 4 INJECTION, SOLUTION INTRA-ARTICULAR; INTRALESIONAL; INTRAMUSCULAR; INTRAVENOUS; SOFT TISSUE at 10:05

## 2024-05-17 RX ADMIN — PACLITAXEL 96 MG: 6 INJECTION, SOLUTION, CONCENTRATE INTRAVENOUS at 11:05

## 2024-05-17 RX ADMIN — CARBOPLATIN 245 MG: 10 INJECTION, SOLUTION INTRAVENOUS at 01:05

## 2024-05-17 NOTE — PROGRESS NOTES
Last clinic 5/9/24, proceed with C1D1 carbo Taxol, RTC on 05/16.  Radiation morning.    Interval History:  - 5/9/24: C1D1 weekly carboplatin/paclitaxel    PLAN:   - Studies   - Hgb 12.1 from 12.7, plt 145 from 150, ANC 4800   - CMP okay  - Imaging   - At end of CRT  - Treatment   - C2D1 today  - RTC    - Scheduled for 5/24/24, next Friday    Radiation therapy in morning

## 2024-05-17 NOTE — Clinical Note
Just wanted to let you know that I'm going to move his infusions from Fridays to Mondays in order to maximize radiation and chemo synergy (if such a thing truly exists). Please let me know if this is going to cause any problems.  Thanks! Jai

## 2024-05-17 NOTE — Clinical Note
Hi all, forwarding as this was a fellow's patient:  -- Labs and RTC on 5/24/24 -- Transition C3 from 5/24/24 to 5/27/24  Thanks! Jai

## 2024-05-17 NOTE — PROGRESS NOTES
[facility] MEDICAL ONCOLOGY - FOLLOW UP VISIT    Reason for visit: Adenocarcinoma of the SANJAY      Oncology History   Adenocarcinoma, lung, left   3/19/2024 Imaging Significant Findings    CTA PE (hemoptysis)  - 3.4 x 5.8 cm L hilar mass, probable invasion of distal L main bronchus and SANJAY bronchi     3/19/2024 - 3/22/2024 Hospital Admission    Presented with new onset hemoptysis.  Imaging demonstrated left hilar mass.  Bronchoscopy performed and confirmed adenocarcinoma, EGFR Exon 20 insertion.     3/21/2024 Procedure    Bronch w/ EBUS  SANJAY, Endobronchial Bx  - Adenocarcinoma (CK7 and TTF1 positive; CK20, p40, and CK 5/6 negative)    SAJNAY, BAL  - Adenocarcinoma    TEMPUS NGS/PD-L1  - PD-L1 TPS: 35%  - EGFR exon 20 inframe insertion ([id number])  - TP53 misssense ([id number])  - Negative: EGFR, ALK, BRAF, KRAS, ROS1, RET, MET, ERBB2  - TMB 9.5 m/MB     4/3/2024 Imaging Significant Findings    MRI Brain  - 0.8 cm rim enhancing lesion in L thalamus, no significant mass effect      4/11/2024 Imaging Significant Findings    PET CT  - 4.8 x 2.3 cm  L hilar mass, SUV 14  - Adjacent pulmonary nodules, e.g. 1.2 cm  - No intrathoracic LAD     4/12/2024 Cancer Staged    Staging form: Lung, AJCC 8th Edition  - Clinical stage from 4/12/2024: Stage IIIA (cT3, cN1, cM0)     4/30/2024 Procedure    Bronch with EBUS  RUL, EBBx  - Adenocarcinoma (TTF-1 positive, p40 negative)    LN  - Adenocarcinoma    Procedure Note  - Station for L was normal-sized not sampled; no other lymph nodes were visible by EBUS, including station 7, 4R, and 11R     5/9/2024 -  Chemotherapy    Treatment Summary   Plan Name: OP NSCLC PACLITAXEL + CARBOPLATIN (AUC) QW + RADIATION  Treatment Goal: Curative  Status: Active  Start Date: 5/9/2024  End Date: 6/14/2024 (Planned)  Provider: Isacc Nuñez IV, MD  Chemotherapy: CARBOplatin (PARAPLATIN) 225 mg in sodium chloride 0.9% 307.5 mL chemo infusion, 225 mg (100 % of original  dose 227 mg), Intravenous, Clinic/HOD 1 time, 2 of 6 cycles  Dose modification:   (original dose 227 mg, Cycle 1)  Administration: 225 mg (5/9/2024)  PACLitaxeL (TAXOL) 45 mg/m2 = 96 mg in sodium chloride 0.9% 250 mL chemo infusion, 45 mg/m2 = 96 mg, Intravenous, Clinic/HOD 1 time, 2 of 6 cycles  Administration: 96 mg (5/9/2024)            HPI:     Jordin Allen Jr. is a 78 y.o. male with pmh significant for restless leg syndrome, COPD, CAD s/p stent (2013), HTN, HLD, obesity, GERD, hearing loss requiring hearing aids, and Stage IIIA (T3N1M0) adenocarcinoma of the L hilum (PD-L1 35%, EGFR exon 20 insertion), initially dx'd 3/21/24, currently on CRT (5/8/24-present) with weekly carboplatin/paclitaxel (5/9/24-present), who presents for follow-up.     Last clinic 5/9/24, C1D1 carboplatin/paclitaxel  (5/9/24), RT started 5/8/24    Interval History:  -Patient presents prior to C2D1 carboplatin/paclitaxel. He reports tolerating cycle 1 well with no acute issues or side-effects. He has been taking PRN compazine prior to large meals with adequate intake. Radiation is going well with no significant fatigue and skin breakdown noted. He was updated regarding labs being appropriate to proceed and was agreeable and understanding.     Past Medical History:   Past Medical History:   Diagnosis Date    Benign neoplasm of colon 10/01/2013    Bronchitis chronic     CAD (coronary artery disease) 10/31/2013    COPD (chronic obstructive pulmonary disease)     Emphysema of lung     GERD (gastroesophageal reflux disease)     Hx of colonic polyps     Hypertension     NAFLD (nonalcoholic fatty liver disease) 03/27/2017    SHOLA on CPAP     RLS (restless legs syndrome)     Screen for colon cancer 08/30/2013        Past Surgical History:   Past Surgical History:   Procedure Laterality Date    bilateral foot surgery  1993    CARDIAC CATHETERIZATION  2013    x1    CATARACT EXTRACTION, BILATERAL      COLON SURGERY  2013    Ace  MD Tiera    COLONOSCOPY N/A 3/21/2018    Procedure: COLONOSCOPY;  Surgeon: Terry Mott MD;  Location: [facility] ENDO (4TH FLR);  Service: Endoscopy;  Laterality: N/A;    COLONOSCOPY N/A 2019    Procedure: COLONOSCOPY;  Surgeon: John Mantilla MD;  Location: [facility] ENDO (4TH FLR);  Service: Endoscopy;  Laterality: N/A;    COLONOSCOPY N/A 2022    Procedure: COLONOSCOPY;  Surgeon: MEDINA Farris MD;  Location: [facility] ENDO ([facility] FLR);  Service: Endoscopy;  Laterality: N/A;  fully vacc-inst portal-tb    ENDOBRONCHIAL ULTRASOUND Bilateral 2024    Procedure: ENDOBRONCHIAL ULTRASOUND (EBUS);  Surgeon: Naveed Aguero MD;  Location: [facility] OR;  Service: Pulmonary;  Laterality: Bilateral;    scrotal abscess removal          Family History:   Family History   Problem Relation Name Age of Onset    Heart disease Mother      Heart attack Mother      Hypertension Mother      No Known Problems Sister      No Known Problems Daughter 2     Asthma Neg Hx      Emphysema Neg Hx      Prostate cancer Neg Hx      Cirrhosis Neg Hx          Social History:   Social History     Tobacco Use    Smoking status: Former     Current packs/day: 0.00     Average packs/day: 1 pack/day for 40.0 years (40.0 ttl pk-yrs)     Types: Cigarettes     Start date: 8/15/1972     Quit date: 8/15/2012     Years since quittin.7     Passive exposure: Never    Smokeless tobacco: Never   Substance Use Topics    Alcohol use: Yes     Alcohol/week: 3.0 standard drinks of alcohol     Types: 3 Cans of beer per week     Comment: maybe 2-3  beers weekly        I have reviewed and updated the patient's past medical, surgical, family and social histories.      ROS:   As per HPI.     Allergies:   Review of patient's allergies indicates:   Allergen Reactions    Brilinta [ticagrelor] Itching    Lisinopril      Other reaction(s): cough    Metoprolol succinate Rash        Medications:   Current Outpatient Medications   Medication Sig  Dispense Refill    albuterol (ACCUNEB) 0.63 mg/3 mL Nebu Take 3 mLs (0.63 mg total) by nebulization every 6 (six) hours as needed (if symptoms failed to improve with inhaler). Rescue 375 mL 11    albuterol (PROVENTIL/VENTOLIN HFA) 90 mcg/actuation inhaler Inhale 2 puffs into the lungs every 4 (four) hours as needed for Wheezing. 8.5 g 11    amitriptyline (ELAVIL) 25 MG tablet Take 1 tablet (25 mg total) by mouth once daily. 90 tablet 1    amLODIPine (NORVASC) 5 MG tablet Take 1 tablet (5 mg total) by mouth once daily. 90 tablet 3    aspirin (ECOTRIN) 81 MG EC tablet Take 81 mg by mouth once daily.       atorvastatin (LIPITOR) 80 MG tablet TAKE 1 TABLET (80 MG TOTAL) BY MOUTH ONCE DAILY. (Patient taking differently: every evening. TAKE 1 TABLET (80 MG TOTAL) BY MOUTH ONCE DAILY.) 90 tablet 3    BETA-CAROTENE,A, W-C & E/MIN (OCUVITE ORAL) Take 1 capsule by mouth once daily.       budesonide-glycopyr-formoterol (BREZTRI AEROSPHERE) 160-9-4.8 mcg/actuation HFAA Inhale 2 puffs into the lungs 2 (two) times a day. 10.7 g 3    echinacea 400 mg Cap Take 1 capsule by mouth once daily.       fluticasone propionate (FLONASE) 50 mcg/actuation nasal spray 2 sprays (100 mcg total) by Each Nostril route once daily. 16 g 11    hydroCHLOROthiazide (HYDRODIURIL) 25 MG tablet Take 1 tablet (25 mg total) by mouth once daily. 90 tablet 3    ketoconazole (NIZORAL) 2 % cream Apply topically once daily. 30 g 1    latanoprost 0.005 % ophthalmic solution Place 1 drop into both eyes once daily.       latanoprost 0.005 % ophthalmic solution INSTILL 1 DROP INTO BOTH EYES AT BEDTIME 7.5 mL 3    losartan (COZAAR) 100 MG tablet Take 1 tablet (100 mg total) by mouth once daily. 90 tablet 2    nitroGLYCERIN (NITROSTAT) 0.4 MG SL tablet Place 1 tablet (0.4 mg total) under the tongue every 5 (five) minutes as needed. 100 tablet 3    OLANZapine (ZYPREXA) 5 MG tablet Take 1 tablet (5 mg total) by mouth every evening. days 1-3 of each  chemotherapy cycle. 18 tablet 0    omeprazole (PRILOSEC) 20 MG capsule Take 1 capsule (20 mg total) by mouth once daily. 90 capsule 3    predniSONE (DELTASONE) 5 MG tablet TAKE 1 TABLET BY MOUTH EVERY OTHER DAY. TAKE WITH FOOD 30 tablet 11    roflumilast (DALIRESP) 500 mcg Tab Take 1 tablet (500 mcg total) by mouth once daily. 90 tablet 3    urea (CARMOL) 40 % Crea Apply topically once daily. 85 g 10    benzonatate (TESSALON) 200 MG capsule Take 1 capsule (200 mg total) by mouth 3 (three) times daily as needed for Cough. 90 capsule 3    prochlorperazine (COMPAZINE) 5 MG tablet Take 1 tablet (5 mg total) by mouth every 6 (six) hours as needed (nausea). 30 tablet 1     No current facility-administered medications for this visit.     Facility-Administered Medications Ordered in Other Visits   Medication Dose Route Frequency Provider Last Rate Last Admin    alteplase injection 2 mg  2 mg Intra-Catheter PRN Isacc Nuñez IV, MD        CARBOplatin (PARAPLATIN) 245 mg in sodium chloride 0.9% 309.5 mL chemo infusion  245 mg Intravenous 1 time in Clinic/HOD Isacc Nuñez IV, MD        dextrose 50% injection 12.5 g  12.5 g Intravenous PRN Lidia Deleon MD        dextrose 50% injection 25 g  25 g Intravenous PRN Lidia Deleon MD        diphenhydrAMINE injection 50 mg  50 mg Intravenous Once PRN Isacc Nuñez IV, MD        EPINEPHrine (EPIPEN) 0.3 mg/0.3 mL pen injection 0.3 mg  0.3 mg Intramuscular Once PRN Isacc Nuñez IV, MD        heparin, porcine (PF) 100 unit/mL injection flush 500 Units  500 Units Intravenous PRN Isacc Nuñez IV, MD        hydrocortisone sodium succinate injection 100 mg  100 mg Intravenous Once PRN Isacc Nuñez IV, MD        insulin regular injection 0-8 Units  0-8 Units Intravenous Continuous PRN Lidia Deleon MD        PACLitaxeL (TAXOL) 45 mg/m2 = 96 mg in sodium chloride 0.9% 250 mL chemo infusion  45 mg/m2 (Treatment Plan Recorded) Intravenous 1  "time in Clinic/HOD Isacc Nuñez IV, MD        prochlorperazine injection Soln 5 mg  5 mg Intravenous Once PRN Isacc Nuñez IV, MD        sodium chloride 0.9% flush 10 mL  10 mL Intravenous PRN Isacc Nuñez IV, MD              Physical Exam:       /72 (BP Location: Left arm, Patient Position: Sitting, BP Method: Large (Automatic))   Pulse 70   Temp 97.5 °F (36.4 °C) (Oral)   Resp 14   Ht 5' 8" (1.727 m)   Wt 101.6 kg (223 lb 15.8 oz)   SpO2 98%   BMI 34.06 kg/m²                Physical Exam  Constitutional:       Appearance: Normal appearance. He is obese.   HENT:      Head: Normocephalic and atraumatic.   Eyes:      Extraocular Movements: Extraocular movements intact.      Conjunctiva/sclera: Conjunctivae normal.      Pupils: Pupils are equal, round, and reactive to light.   Cardiovascular:      Rate and Rhythm: Normal rate and regular rhythm.      Heart sounds: No murmur heard.     No friction rub. No gallop.   Pulmonary:      Effort: Pulmonary effort is normal.      Breath sounds: No wheezing, rhonchi or rales.   Musculoskeletal:         General: Normal range of motion.      Right lower leg: No edema.      Left lower leg: No edema.   Skin:     General: Skin is warm and dry.      Comments: Bruising over bilateral dorsa of hands   Neurological:      Mental Status: He is alert and oriented to person, place, and time.   Psychiatric:         Mood and Affect: Mood normal.         Thought Content: Thought content normal.         Judgment: Judgment normal.         Labs:   Recent Results (from the past 48 hour(s))   Comprehensive Metabolic Panel    Collection Time: 05/17/24  8:11 AM   Result Value Ref Range    Sodium 140 136 - 145 mmol/L    Potassium 4.1 3.5 - 5.1 mmol/L    Chloride 104 95 - 110 mmol/L    CO2 28 23 - 29 mmol/L    Glucose 109 70 - 110 mg/dL    BUN 23 8 - 23 mg/dL    Creatinine 0.9 0.5 - 1.4 mg/dL    Calcium 9.4 8.7 - 10.5 mg/dL    Total Protein 6.4 6.0 - 8.4 g/dL    Albumin 3.4 " (L) 3.5 - 5.2 g/dL    Total Bilirubin 0.9 0.1 - 1.0 mg/dL    Alkaline Phosphatase 113 55 - 135 U/L    AST 17 10 - 40 U/L    ALT 29 10 - 44 U/L    eGFR >60.0 >60 mL/min/1.73 m^2    Anion Gap 8 8 - 16 mmol/L   CBC Oncology    Collection Time: 05/17/24  8:11 AM   Result Value Ref Range    WBC 7.06 3.90 - 12.70 K/uL    RBC 4.63 4.60 - 6.20 M/uL    Hemoglobin 12.1 (L) 14.0 - 18.0 g/dL    Hematocrit 39.0 (L) 40.0 - 54.0 %    MCV 84 82 - 98 fL    MCH 26.1 (L) 27.0 - 31.0 pg    MCHC 31.0 (L) 32.0 - 36.0 g/dL    RDW 15.1 (H) 11.5 - 14.5 %    Platelets 145 (L) 150 - 450 K/uL    MPV 10.4 9.2 - 12.9 fL    Gran # (ANC) 4.8 1.8 - 7.7 K/uL    Immature Grans (Abs) 0.06 (H) 0.00 - 0.04 K/uL          Imaging:    See oncologic history above.     Path:  See oncologic history above.      Assessment and Plan:     Jordin Allen Jr. is a 78 y.o. male with pmh significant for restless leg syndrome, COPD, CAD s/p stent (2013), HTN, HLD, obesity, GERD, hearing loss requiring hearing aids, and Stage IIIA (T3N1M0) adenocarcinoma of the L hilum (PD-L1 35%, EGFR exon 20 insertion), initially dx'd 3/21/24, currently on CRT (5/8/24-present) with weekly carboplatin/paclitaxel (5/9/24-present), who presents for follow-up.     Stage IIIA Adenocarcinoma of L Hilum, EGFR Exon 20 insertion, PD-L1 35%  ECOG PS 1 (limited by MONTIEL).  Patient presents today advanced starting weekly carboplatin/paclitaxel, noting that he started radiation therapy yesterday (5/8/24).  Since last visit, a bronchoscopy with EBUS revealed disease in the 11R but no other lymph nodes.  Most recent imaging is a PET-CT from 4/11/24.  Plan to proceed with 6 cycles of weekly carboplatin/paclitaxel in concert with radiation therapy, followed by 1 year immunotherapy maintenance.    PLAN:   -- Proceed with C2D1 carboplatin/paclitaxel today (5/17/24), labs acceptable and treatment signed  -- Antiemetics discussed and sent to pharmacy  -- Labs, RTC on 5/24/24 and C3D1 on 5/27/24 for  synergy with RT  -- [facility] enrollment at last visit   --Continue RT as scheduled      The above information has been reviewed with the patient and all questions have been answered to their apparent satisfaction.  They understand that they can call the clinic with any questions.    Isacc Nuñez MD  Hematology/Oncology  [facility]      Route Chart for Scheduling    Med Onc Chart Routing      Follow up with physician . As scheduled on 5/24/24, next visit with labs on 5/31/24   Follow up with JARAD    Infusion scheduling note    Injection scheduling note Please move 5/24 treatment to 5/27/24, C4 on 6/3/24   Labs   Scheduling:  Preferred lab:  Lab interval:  Labs as scheduled   Imaging    Pharmacy appointment    Other referrals            Disclaimer: This note was prepared using voice recognition system and is likely to have sound alike errors and is not proof read.  Please call me with any questions.     *Staging updated above after further review of imaging, will discuss with patient at next appointment, treatment recommendations are not changed*

## 2024-05-20 PROCEDURE — 77386 HC IMRT, COMPLEX: CPT | Performed by: RADIOLOGY

## 2024-05-20 PROCEDURE — G6002 STEREOSCOPIC X-RAY GUIDANCE: HCPCS | Mod: 26,,, | Performed by: RADIOLOGY

## 2024-05-21 ENCOUNTER — DOCUMENTATION ONLY (OUTPATIENT)
Dept: RADIATION THERAPY | Facility: HOSPITAL | Age: 79
End: 2024-05-21
Payer: MEDICARE

## 2024-05-21 DIAGNOSIS — C34.12 ADENOCARCINOMA OF UPPER LOBE OF LEFT LUNG: Primary | ICD-10-CM

## 2024-05-21 PROCEDURE — 77336 RADIATION PHYSICS CONSULT: CPT | Performed by: RADIOLOGY

## 2024-05-21 PROCEDURE — 77014 PR  CT GUIDANCE PLACEMENT RAD THERAPY FIELDS: CPT | Mod: 26,,, | Performed by: RADIOLOGY

## 2024-05-21 PROCEDURE — 77386 HC IMRT, COMPLEX: CPT | Performed by: RADIOLOGY

## 2024-05-21 NOTE — PLAN OF CARE
Day  10 of outpatient radiation to lung. Weighs 103.6 kg. Denies shortness of breath and cough. Tolerating treatment.

## 2024-05-22 PROCEDURE — 77386 HC IMRT, COMPLEX: CPT | Performed by: RADIOLOGY

## 2024-05-22 PROCEDURE — G6002 STEREOSCOPIC X-RAY GUIDANCE: HCPCS | Mod: 26,,, | Performed by: RADIOLOGY

## 2024-05-22 PROCEDURE — 77427 RADIATION TX MANAGEMENT X5: CPT | Mod: ,,, | Performed by: RADIOLOGY

## 2024-05-23 ENCOUNTER — TELEPHONE (OUTPATIENT)
Dept: HEMATOLOGY/ONCOLOGY | Facility: CLINIC | Age: 79
End: 2024-05-23
Payer: MEDICARE

## 2024-05-23 PROCEDURE — 77386 HC IMRT, COMPLEX: CPT | Performed by: RADIOLOGY

## 2024-05-23 PROCEDURE — G6002 STEREOSCOPIC X-RAY GUIDANCE: HCPCS | Mod: 26,,, | Performed by: RADIOLOGY

## 2024-05-24 ENCOUNTER — PATIENT MESSAGE (OUTPATIENT)
Dept: ADMINISTRATIVE | Facility: OTHER | Age: 79
End: 2024-05-24
Payer: MEDICARE

## 2024-05-24 ENCOUNTER — OFFICE VISIT (OUTPATIENT)
Dept: HEMATOLOGY/ONCOLOGY | Facility: CLINIC | Age: 79
End: 2024-05-24
Payer: MEDICARE

## 2024-05-24 ENCOUNTER — LAB VISIT (OUTPATIENT)
Dept: LAB | Facility: HOSPITAL | Age: 79
End: 2024-05-24
Attending: HOSPITALIST
Payer: MEDICARE

## 2024-05-24 VITALS
OXYGEN SATURATION: 98 % | HEART RATE: 78 BPM | TEMPERATURE: 96 F | RESPIRATION RATE: 16 BRPM | SYSTOLIC BLOOD PRESSURE: 131 MMHG | DIASTOLIC BLOOD PRESSURE: 68 MMHG | WEIGHT: 223.31 LBS | HEIGHT: 68 IN | BODY MASS INDEX: 33.84 KG/M2

## 2024-05-24 DIAGNOSIS — C34.12 ADENOCARCINOMA OF UPPER LOBE OF LEFT LUNG: ICD-10-CM

## 2024-05-24 DIAGNOSIS — C34.12 ADENOCARCINOMA OF UPPER LOBE OF LEFT LUNG: Primary | ICD-10-CM

## 2024-05-24 LAB
ALBUMIN SERPL BCP-MCNC: 3.3 G/DL (ref 3.5–5.2)
ALP SERPL-CCNC: 101 U/L (ref 55–135)
ALT SERPL W/O P-5'-P-CCNC: 27 U/L (ref 10–44)
ANION GAP SERPL CALC-SCNC: 8 MMOL/L (ref 8–16)
AST SERPL-CCNC: 17 U/L (ref 10–40)
BILIRUB SERPL-MCNC: 1 MG/DL (ref 0.1–1)
BUN SERPL-MCNC: 20 MG/DL (ref 8–23)
CALCIUM SERPL-MCNC: 9.7 MG/DL (ref 8.7–10.5)
CHLORIDE SERPL-SCNC: 104 MMOL/L (ref 95–110)
CO2 SERPL-SCNC: 31 MMOL/L (ref 23–29)
CREAT SERPL-MCNC: 0.9 MG/DL (ref 0.5–1.4)
ERYTHROCYTE [DISTWIDTH] IN BLOOD BY AUTOMATED COUNT: 15 % (ref 11.5–14.5)
EST. GFR  (NO RACE VARIABLE): >60 ML/MIN/1.73 M^2
GLUCOSE SERPL-MCNC: 108 MG/DL (ref 70–110)
HCT VFR BLD AUTO: 38.5 % (ref 40–54)
HGB BLD-MCNC: 12 G/DL (ref 14–18)
IMM GRANULOCYTES # BLD AUTO: 0.03 K/UL (ref 0–0.04)
MCH RBC QN AUTO: 26.7 PG (ref 27–31)
MCHC RBC AUTO-ENTMCNC: 31.2 G/DL (ref 32–36)
MCV RBC AUTO: 86 FL (ref 82–98)
NEUTROPHILS # BLD AUTO: 3 K/UL (ref 1.8–7.7)
PLATELET # BLD AUTO: 158 K/UL (ref 150–450)
PMV BLD AUTO: 10.3 FL (ref 9.2–12.9)
POTASSIUM SERPL-SCNC: 4.3 MMOL/L (ref 3.5–5.1)
PROT SERPL-MCNC: 6.6 G/DL (ref 6–8.4)
RBC # BLD AUTO: 4.49 M/UL (ref 4.6–6.2)
SODIUM SERPL-SCNC: 143 MMOL/L (ref 136–145)
WBC # BLD AUTO: 4.59 K/UL (ref 3.9–12.7)

## 2024-05-24 PROCEDURE — 99215 OFFICE O/P EST HI 40 MIN: CPT | Mod: PBBFAC | Performed by: HOSPITALIST

## 2024-05-24 PROCEDURE — 77386 HC IMRT, COMPLEX: CPT | Performed by: RADIOLOGY

## 2024-05-24 PROCEDURE — 77014 PR  CT GUIDANCE PLACEMENT RAD THERAPY FIELDS: CPT | Mod: 26,,, | Performed by: RADIOLOGY

## 2024-05-24 PROCEDURE — G2211 COMPLEX E/M VISIT ADD ON: HCPCS | Mod: S$PBB,,, | Performed by: HOSPITALIST

## 2024-05-24 PROCEDURE — 99999 PR PBB SHADOW E&M-EST. PATIENT-LVL V: CPT | Mod: PBBFAC,,, | Performed by: HOSPITALIST

## 2024-05-24 PROCEDURE — 99215 OFFICE O/P EST HI 40 MIN: CPT | Mod: S$PBB,,, | Performed by: HOSPITALIST

## 2024-05-24 PROCEDURE — 36415 COLL VENOUS BLD VENIPUNCTURE: CPT | Performed by: HOSPITALIST

## 2024-05-24 PROCEDURE — 80053 COMPREHEN METABOLIC PANEL: CPT | Performed by: HOSPITALIST

## 2024-05-24 PROCEDURE — 85027 COMPLETE CBC AUTOMATED: CPT | Performed by: HOSPITALIST

## 2024-05-24 NOTE — PROGRESS NOTES
PLAN:   -- Proceed w/  C3D1 on 5/27/24, labs acceptable (hgb stable at 12.0, plt stable at 158) and treatment signed  -- Labs and RTC on 5/31/24, C4D1 on 6/3/24  -- Repeat imaging at end of CRT

## 2024-05-24 NOTE — PROGRESS NOTES
[facility] MEDICAL ONCOLOGY - FOLLOW UP VISIT    Reason for visit: Adenocarcinoma of the SANJAY      Oncology History   Adenocarcinoma, lung, left   3/19/2024 Imaging Significant Findings    CTA PE (hemoptysis)  - 3.4 x 5.8 cm L hilar mass, probable invasion of distal L main bronchus and SANJAY bronchi     3/19/2024 - 3/22/2024 Hospital Admission    Presented with new onset hemoptysis.  Imaging demonstrated left hilar mass.  Bronchoscopy performed and confirmed adenocarcinoma, EGFR Exon 20 insertion.     3/21/2024 Procedure    Bronch w/ EBUS  SANJAY, Endobronchial Bx  - Adenocarcinoma (CK7 and TTF1 positive; CK20, p40, and CK 5/6 negative)    SANJAY, BAL  - Adenocarcinoma    TEMPUS NGS/PD-L1  - PD-L1 TPS: 35%  - EGFR exon 20 inframe insertion ([id number])  - TP53 misssense ([id number])  - Negative: EGFR, ALK, BRAF, KRAS, ROS1, RET, MET, ERBB2  - TMB 9.5 m/MB     4/3/2024 Imaging Significant Findings    MRI Brain  - 0.8 cm rim enhancing lesion in L thalamus, no significant mass effect      4/11/2024 Imaging Significant Findings    PET CT  - 4.8 x 2.3 cm  L hilar mass, SUV 14  - Adjacent pulmonary nodules, e.g. 1.2 cm  - No intrathoracic LAD     4/12/2024 Cancer Staged    Staging form: Lung, AJCC 8th Edition  - Clinical stage from 4/12/2024: Stage IIIA (cT3, cN1, cM0)     4/30/2024 Procedure    Bronch with EBUS  RUL, EBBx  - Adenocarcinoma (TTF-1 positive, p40 negative)    LN  - Adenocarcinoma    Procedure Note  - Station for L was normal-sized not sampled; no other lymph nodes were visible by EBUS, including station 7, 4R, and 11R     5/9/2024 -  Chemotherapy    Treatment Summary   Plan Name: OP NSCLC PACLITAXEL + CARBOPLATIN (AUC) QW + RADIATION  Treatment Goal: Curative  Status: Active  Start Date: 5/9/2024  End Date: 6/14/2024 (Planned)  Provider: Isacc Nuñez IV, MD  Chemotherapy: CARBOplatin (PARAPLATIN) 225 mg in sodium chloride 0.9% 307.5 mL chemo infusion, 225 mg (100 % of original  dose 227 mg), Intravenous, Clinic/HOD 1 time, 2 of 6 cycles  Dose modification:   (original dose 227 mg, Cycle 1)  Administration: 225 mg (5/9/2024), 245 mg (5/17/2024)  PACLitaxeL (TAXOL) 45 mg/m2 = 96 mg in sodium chloride 0.9% 250 mL chemo infusion, 45 mg/m2 = 96 mg, Intravenous, Clinic/HOD 1 time, 2 of 6 cycles  Administration: 96 mg (5/9/2024), 96 mg (5/17/2024)            HPI:     Jordin Allen Jr. is a 78 y.o. male with pmh significant for restless leg syndrome, COPD, CAD s/p stent (2013), HTN, HLD, obesity, GERD, hearing loss requiring hearing aids, and Stage IIIA (T3N1M0) adenocarcinoma of the L hilum (PD-L1 35%, EGFR exon 20 insertion), initially dx'd 3/21/24, currently on CRT (5/8/24-present) with weekly carboplatin/paclitaxel (5/9/24-present), who presents for follow-up.     Last clinic 5/17/24, C2D1 carboplatin/paclitaxel  (5/9/24), RT started 5/8/24    Interval History:  -Patient presents prior to C3D1 carboplatin/paclitaxel. He reports tolerating cycle 2 well with no acute issues or side-effects other than some mild sore throat with him using PRN magic mouthwash with some relief. His appetite and energy levels remain at baseline at this visit.  He was updated regarding labs being appropriate to proceed and was agreeable and understanding.     Past Medical History:   Past Medical History:   Diagnosis Date    Benign neoplasm of colon 10/01/2013    Bronchitis chronic     CAD (coronary artery disease) 10/31/2013    COPD (chronic obstructive pulmonary disease)     Emphysema of lung     GERD (gastroesophageal reflux disease)     Hx of colonic polyps     Hypertension     NAFLD (nonalcoholic fatty liver disease) 03/27/2017    SHOLA on CPAP     RLS (restless legs syndrome)     Screen for colon cancer 08/30/2013        Past Surgical History:   Past Surgical History:   Procedure Laterality Date    bilateral foot surgery  1993    CARDIAC CATHETERIZATION  2013    x1    CATARACT EXTRACTION,  BILATERAL      COLON SURGERY      Ace Mott MD    COLONOSCOPY N/A 3/21/2018    Procedure: COLONOSCOPY;  Surgeon: Terry Mott MD;  [facility]  Service: Endoscopy;  Laterality: N/A;    COLONOSCOPY N/A 2019    Procedure: COLONOSCOPY;  Surgeon: John Mantilla MD;  [facility]  Service: Endoscopy;  Laterality: N/A;    COLONOSCOPY N/A 2022    Procedure: COLONOSCOPY;  Surgeon: MEDINA Farris MD;  [facility]  Service: Endoscopy;  Laterality: N/A;  fully vacc-inst portal-tb    ENDOBRONCHIAL ULTRASOUND Bilateral 2024    Procedure: ENDOBRONCHIAL ULTRASOUND (EBUS);  Surgeon: Naveed Aguero MD;  Location: [facility] OR;  Service: Pulmonary;  Laterality: Bilateral;    scrotal abscess removal          Family History:   Family History   Problem Relation Name Age of Onset    Heart disease Mother      Heart attack Mother      Hypertension Mother      No Known Problems Sister      No Known Problems Daughter 2     Asthma Neg Hx      Emphysema Neg Hx      Prostate cancer Neg Hx      Cirrhosis Neg Hx          Social History:   Social History     Tobacco Use    Smoking status: Former     Current packs/day: 0.00     Average packs/day: 1 pack/day for 40.0 years (40.0 ttl pk-yrs)     Types: Cigarettes     Start date: 8/15/1972     Quit date: 8/15/2012     Years since quittin.7     Passive exposure: Never    Smokeless tobacco: Never   Substance Use Topics    Alcohol use: Yes     Alcohol/week: 3.0 standard drinks of alcohol     Types: 3 Cans of beer per week     Comment: maybe 2-3  beers weekly        I have reviewed and updated the patient's past medical, surgical, family and social histories.      ROS:   As per HPI.     Allergies:   Review of patient's allergies indicates:   Allergen Reactions    Brilinta [ticagrelor] Itching    Lisinopril      Other reaction(s): cough    Metoprolol succinate Rash        Medications:   Current  Outpatient Medications   Medication Sig Dispense Refill    albuterol (ACCUNEB) 0.63 mg/3 mL Nebu Take 3 mLs (0.63 mg total) by nebulization every 6 (six) hours as needed (if symptoms failed to improve with inhaler). Rescue 375 mL 11    albuterol (PROVENTIL/VENTOLIN HFA) 90 mcg/actuation inhaler Inhale 2 puffs into the lungs every 4 (four) hours as needed for Wheezing. 8.5 g 11    amitriptyline (ELAVIL) 25 MG tablet Take 1 tablet (25 mg total) by mouth once daily. 90 tablet 1    amLODIPine (NORVASC) 5 MG tablet Take 1 tablet (5 mg total) by mouth once daily. 90 tablet 3    aspirin (ECOTRIN) 81 MG EC tablet Take 81 mg by mouth once daily.       atorvastatin (LIPITOR) 80 MG tablet TAKE 1 TABLET (80 MG TOTAL) BY MOUTH ONCE DAILY. (Patient taking differently: every evening. TAKE 1 TABLET (80 MG TOTAL) BY MOUTH ONCE DAILY.) 90 tablet 3    BETA-CAROTENE,A, W-C & E/MIN (OCUVITE ORAL) Take 1 capsule by mouth once daily.       budesonide-glycopyr-formoterol (BREZTRI AEROSPHERE) 160-9-4.8 mcg/actuation HFAA Inhale 2 puffs into the lungs 2 (two) times a day. 10.7 g 3    echinacea 400 mg Cap Take 1 capsule by mouth once daily.       fluticasone propionate (FLONASE) 50 mcg/actuation nasal spray 2 sprays (100 mcg total) by Each Nostril route once daily. 16 g 11    hydroCHLOROthiazide (HYDRODIURIL) 25 MG tablet Take 1 tablet (25 mg total) by mouth once daily. 90 tablet 3    ketoconazole (NIZORAL) 2 % cream Apply topically once daily. 30 g 1    latanoprost 0.005 % ophthalmic solution Place 1 drop into both eyes once daily.       latanoprost 0.005 % ophthalmic solution INSTILL 1 DROP INTO BOTH EYES AT BEDTIME 7.5 mL 3    losartan (COZAAR) 100 MG tablet Take 1 tablet (100 mg total) by mouth once daily. 90 tablet 2    magic mouthwash diphen/antac/lidoc/nysta Swish and Spit 10 mLs by mouth 4 (four) times daily. 360 mL 3    nitroGLYCERIN (NITROSTAT) 0.4 MG SL tablet Place 1 tablet (0.4 mg total) under the tongue every 5  "(five) minutes as needed. 100 tablet 3    OLANZapine (ZYPREXA) 5 MG tablet Take 1 tablet (5 mg total) by mouth every evening. days 1-3 of each chemotherapy cycle. 18 tablet 0    omeprazole (PRILOSEC) 20 MG capsule Take 1 capsule (20 mg total) by mouth once daily. 90 capsule 3    predniSONE (DELTASONE) 5 MG tablet TAKE 1 TABLET BY MOUTH EVERY OTHER DAY. TAKE WITH FOOD 30 tablet 11    prochlorperazine (COMPAZINE) 5 MG tablet Take 1 tablet (5 mg total) by mouth every 6 (six) hours as needed (nausea). 30 tablet 1    roflumilast (DALIRESP) 500 mcg Tab Take 1 tablet (500 mcg total) by mouth once daily. 90 tablet 3    urea (CARMOL) 40 % Crea Apply topically once daily. 85 g 10    benzonatate (TESSALON) 200 MG capsule Take 1 capsule (200 mg total) by mouth 3 (three) times daily as needed for Cough. 90 capsule 3     No current facility-administered medications for this visit.     Facility-Administered Medications Ordered in Other Visits   Medication Dose Route Frequency Provider Last Rate Last Admin    dextrose 50% injection 12.5 g  12.5 g Intravenous PRN Lidia Deleon MD        dextrose 50% injection 25 g  25 g Intravenous PRN Lidia Deleon MD        insulin regular injection 0-8 Units  0-8 Units Intravenous Continuous PRN Lidia Deleon MD              Physical Exam:       /68 (BP Location: Right arm, Patient Position: Sitting, BP Method: Large (Automatic))   Pulse 78   Temp 96.1 °F (35.6 °C) (Oral)   Resp 16   Ht 5' 8" (1.727 m)   Wt 101.3 kg (223 lb 5.2 oz)   SpO2 98%   BMI 33.96 kg/m²                Physical Exam  Constitutional:       Appearance: Normal appearance. He is obese.   HENT:      Head: Normocephalic and atraumatic.   Eyes:      Extraocular Movements: Extraocular movements intact.      Conjunctiva/sclera: Conjunctivae normal.      Pupils: Pupils are equal, round, and reactive to light.   Cardiovascular:      Rate and Rhythm: Normal rate and regular rhythm.      Heart " sounds: No murmur heard.     No friction rub. No gallop.   Pulmonary:      Effort: Pulmonary effort is normal.      Breath sounds: No wheezing, rhonchi or rales.   Musculoskeletal:         General: Normal range of motion.      Right lower leg: No edema.      Left lower leg: No edema.   Skin:     General: Skin is warm and dry.      Comments: Bruising over bilateral dorsa of hands   Neurological:      Mental Status: He is alert and oriented to person, place, and time.   Psychiatric:         Mood and Affect: Mood normal.         Thought Content: Thought content normal.         Judgment: Judgment normal.         Labs:   Recent Results (from the past 48 hour(s))   Comprehensive Metabolic Panel    Collection Time: 05/24/24  7:00 AM   Result Value Ref Range    Sodium 143 136 - 145 mmol/L    Potassium 4.3 3.5 - 5.1 mmol/L    Chloride 104 95 - 110 mmol/L    CO2 31 (H) 23 - 29 mmol/L    Glucose 108 70 - 110 mg/dL    BUN 20 8 - 23 mg/dL    Creatinine 0.9 0.5 - 1.4 mg/dL    Calcium 9.7 8.7 - 10.5 mg/dL    Total Protein 6.6 6.0 - 8.4 g/dL    Albumin 3.3 (L) 3.5 - 5.2 g/dL    Total Bilirubin 1.0 0.1 - 1.0 mg/dL    Alkaline Phosphatase 101 55 - 135 U/L    AST 17 10 - 40 U/L    ALT 27 10 - 44 U/L    eGFR >60.0 >60 mL/min/1.73 m^2    Anion Gap 8 8 - 16 mmol/L   CBC Oncology    Collection Time: 05/24/24  7:00 AM   Result Value Ref Range    WBC 4.59 3.90 - 12.70 K/uL    RBC 4.49 (L) 4.60 - 6.20 M/uL    Hemoglobin 12.0 (L) 14.0 - 18.0 g/dL    Hematocrit 38.5 (L) 40.0 - 54.0 %    MCV 86 82 - 98 fL    MCH 26.7 (L) 27.0 - 31.0 pg    MCHC 31.2 (L) 32.0 - 36.0 g/dL    RDW 15.0 (H) 11.5 - 14.5 %    Platelets 158 150 - 450 K/uL    MPV 10.3 9.2 - 12.9 fL    Gran # (ANC) 3.0 1.8 - 7.7 K/uL    Immature Grans (Abs) 0.03 0.00 - 0.04 K/uL          Imaging:    See oncologic history above.     Path:  See oncologic history above.      Assessment and Plan:     Jordin Allen Jr. is a 78 y.o. male with pmh significant for restless leg syndrome, COPD,  CAD s/p stent (2013), HTN, HLD, obesity, GERD, hearing loss requiring hearing aids, and Stage IIIA (T3N1M0) adenocarcinoma of the L hilum (PD-L1 35%, EGFR exon 20 insertion), initially dx'd 3/21/24, currently on CRT (5/8/24-present) with weekly carboplatin/paclitaxel (5/9/24-present), who presents for follow-up.     Stage IIIA Adenocarcinoma of L Hilum, EGFR Exon 20 insertion, PD-L1 35%  ECOG PS 1 (limited by MONTIEL).  Patient presents today advanced starting weekly carboplatin/paclitaxel, noting that he started radiation therapy yesterday (5/8/24).  Since last visit, a bronchoscopy with EBUS revealed disease in the 11R but no other lymph nodes.  Most recent imaging is a PET-CT from 4/11/24.  Plan to proceed with 6 cycles of weekly carboplatin/paclitaxel in concert with radiation therapy, followed by 1 year immunotherapy maintenance.    PLAN:   -- Proceed with C3D1 carboplatin/paclitaxel  (5/27), labs acceptable and treatment signed  -- Antiemetics discussed and sent to pharmacy  -- Labs, RTC on 5/30/24 and C4D1 on 6/3/24 for synergy with RT  -- [facility] enrollment at last visit   -- Continue RT as scheduled  -- End of treatment CT to assess response      The above information has been reviewed with the patient and all questions have been answered to their apparent satisfaction.  They understand that they can call the clinic with any questions.    Isacc Nuñez MD  Hematology/Oncology  [facility]      Route Chart for Scheduling    Med Onc Chart Routing      Follow up with physician . Dr. Nuñez on 6/7/24   Follow up with JARAD    Infusion scheduling note    Injection scheduling note Please schedule C5 on 6/10/24   Labs CBC and CMP   Scheduling:  Preferred lab:  Lab interval:  CBC, CMP prior to 6/7/24 visit   Imaging    Pharmacy appointment    Other referrals                Disclaimer: This note was prepared using voice recognition system and is likely to have sound alike errors and is not proof  read.  Please call me with any questions.     *Staging updated above after further review of imaging, will discuss with patient at next appointment, treatment recommendations are not changed*

## 2024-05-27 ENCOUNTER — INFUSION (OUTPATIENT)
Dept: INFUSION THERAPY | Facility: HOSPITAL | Age: 79
End: 2024-05-27
Payer: MEDICARE

## 2024-05-27 VITALS
DIASTOLIC BLOOD PRESSURE: 64 MMHG | OXYGEN SATURATION: 97 % | WEIGHT: 223.75 LBS | HEART RATE: 79 BPM | BODY MASS INDEX: 33.91 KG/M2 | HEIGHT: 68 IN | SYSTOLIC BLOOD PRESSURE: 118 MMHG | RESPIRATION RATE: 16 BRPM | TEMPERATURE: 98 F

## 2024-05-27 DIAGNOSIS — C34.92 ADENOCARCINOMA, LUNG, LEFT: Primary | ICD-10-CM

## 2024-05-27 PROCEDURE — 96417 CHEMO IV INFUS EACH ADDL SEQ: CPT

## 2024-05-27 PROCEDURE — 63600175 PHARM REV CODE 636 W HCPCS: Performed by: NURSE PRACTITIONER

## 2024-05-27 PROCEDURE — G6002 STEREOSCOPIC X-RAY GUIDANCE: HCPCS | Mod: 26,,, | Performed by: RADIOLOGY

## 2024-05-27 PROCEDURE — 96413 CHEMO IV INFUSION 1 HR: CPT

## 2024-05-27 PROCEDURE — 77386 HC IMRT, COMPLEX: CPT | Performed by: RADIOLOGY

## 2024-05-27 PROCEDURE — 25000003 PHARM REV CODE 250: Performed by: NURSE PRACTITIONER

## 2024-05-27 PROCEDURE — 77417 THER RADIOLOGY PORT IMAGE(S): CPT | Performed by: RADIOLOGY

## 2024-05-27 PROCEDURE — 96367 TX/PROPH/DG ADDL SEQ IV INF: CPT

## 2024-05-27 PROCEDURE — 96375 TX/PRO/DX INJ NEW DRUG ADDON: CPT

## 2024-05-27 RX ORDER — HEPARIN 100 UNIT/ML
500 SYRINGE INTRAVENOUS
Status: DISCONTINUED | OUTPATIENT
Start: 2024-05-27 | End: 2024-05-27 | Stop reason: HOSPADM

## 2024-05-27 RX ORDER — SODIUM CHLORIDE 0.9 % (FLUSH) 0.9 %
10 SYRINGE (ML) INJECTION
Status: CANCELLED | OUTPATIENT
Start: 2024-05-27

## 2024-05-27 RX ORDER — EPINEPHRINE 0.3 MG/.3ML
0.3 INJECTION SUBCUTANEOUS ONCE AS NEEDED
Status: DISCONTINUED | OUTPATIENT
Start: 2024-05-27 | End: 2024-05-27 | Stop reason: HOSPADM

## 2024-05-27 RX ORDER — PROCHLORPERAZINE EDISYLATE 5 MG/ML
5 INJECTION INTRAMUSCULAR; INTRAVENOUS ONCE AS NEEDED
Status: DISCONTINUED | OUTPATIENT
Start: 2024-05-27 | End: 2024-05-27 | Stop reason: HOSPADM

## 2024-05-27 RX ORDER — FAMOTIDINE 10 MG/ML
20 INJECTION INTRAVENOUS
Status: COMPLETED | OUTPATIENT
Start: 2024-05-27 | End: 2024-05-27

## 2024-05-27 RX ORDER — DIPHENHYDRAMINE HYDROCHLORIDE 50 MG/ML
50 INJECTION INTRAMUSCULAR; INTRAVENOUS ONCE AS NEEDED
Status: DISCONTINUED | OUTPATIENT
Start: 2024-05-27 | End: 2024-05-27 | Stop reason: HOSPADM

## 2024-05-27 RX ORDER — SODIUM CHLORIDE 0.9 % (FLUSH) 0.9 %
10 SYRINGE (ML) INJECTION
Status: DISCONTINUED | OUTPATIENT
Start: 2024-05-27 | End: 2024-05-27 | Stop reason: HOSPADM

## 2024-05-27 RX ORDER — DIPHENHYDRAMINE HYDROCHLORIDE 50 MG/ML
50 INJECTION INTRAMUSCULAR; INTRAVENOUS ONCE AS NEEDED
Status: CANCELLED | OUTPATIENT
Start: 2024-05-27

## 2024-05-27 RX ORDER — EPINEPHRINE 0.3 MG/.3ML
0.3 INJECTION SUBCUTANEOUS ONCE AS NEEDED
Status: CANCELLED | OUTPATIENT
Start: 2024-05-27

## 2024-05-27 RX ORDER — HEPARIN 100 UNIT/ML
500 SYRINGE INTRAVENOUS
Status: CANCELLED | OUTPATIENT
Start: 2024-05-27

## 2024-05-27 RX ORDER — PROCHLORPERAZINE EDISYLATE 5 MG/ML
5 INJECTION INTRAMUSCULAR; INTRAVENOUS ONCE AS NEEDED
Status: CANCELLED
Start: 2024-05-27

## 2024-05-27 RX ORDER — FAMOTIDINE 10 MG/ML
20 INJECTION INTRAVENOUS
Status: CANCELLED | OUTPATIENT
Start: 2024-05-27

## 2024-05-27 RX ADMIN — DIPHENHYDRAMINE HYDROCHLORIDE 50 MG: 50 INJECTION, SOLUTION INTRAMUSCULAR; INTRAVENOUS at 08:05

## 2024-05-27 RX ADMIN — CARBOPLATIN 245 MG: 10 INJECTION, SOLUTION INTRAVENOUS at 10:05

## 2024-05-27 RX ADMIN — SODIUM CHLORIDE: 9 INJECTION, SOLUTION INTRAVENOUS at 08:05

## 2024-05-27 RX ADMIN — PACLITAXEL 96 MG: 6 INJECTION, SOLUTION INTRAVENOUS at 09:05

## 2024-05-27 RX ADMIN — FAMOTIDINE 20 MG: 10 INJECTION INTRAVENOUS at 08:05

## 2024-05-27 RX ADMIN — DEXAMETHASONE SODIUM PHOSPHATE 0.25 MG: 4 INJECTION, SOLUTION INTRA-ARTICULAR; INTRALESIONAL; INTRAMUSCULAR; INTRAVENOUS; SOFT TISSUE at 08:05

## 2024-05-27 NOTE — PLAN OF CARE
1130 Patient tolerated C3D1 taxol and carbo with no s/s of reaction and no complaints. PIV removed and catheter tip intact. He declined the AVS and ambulated out.

## 2024-05-27 NOTE — PLAN OF CARE
0810 Patient here for C3D1 taxol/carbo. Labs, meds and hx reviewed. Patient seen by MD on 5/24/24 and is appropriate for treatment. PIV inserted and NS started at 25ml/hr. Coffee and blanket given to patient.

## 2024-05-28 ENCOUNTER — DOCUMENTATION ONLY (OUTPATIENT)
Dept: RADIATION ONCOLOGY | Facility: CLINIC | Age: 79
End: 2024-05-28
Payer: MEDICARE

## 2024-05-28 ENCOUNTER — PATIENT MESSAGE (OUTPATIENT)
Dept: ADMINISTRATIVE | Facility: OTHER | Age: 79
End: 2024-05-28
Payer: MEDICARE

## 2024-05-28 PROCEDURE — 77336 RADIATION PHYSICS CONSULT: CPT | Performed by: RADIOLOGY

## 2024-05-28 PROCEDURE — 77386 HC IMRT, COMPLEX: CPT | Performed by: RADIOLOGY

## 2024-05-28 PROCEDURE — G6002 STEREOSCOPIC X-RAY GUIDANCE: HCPCS | Mod: 26,,, | Performed by: RADIOLOGY

## 2024-05-28 NOTE — PLAN OF CARE
Day 15 of outpatient radiation to the left lung. Reports shortness of breath this morning from walking to the building. Denies cough. Able to sleep flat at night. Plans to go to golf course today. Has portable O2 tank if needed.Do not use oxygen on a regular basis.

## 2024-05-29 PROCEDURE — 77014 PR  CT GUIDANCE PLACEMENT RAD THERAPY FIELDS: CPT | Mod: 26,,, | Performed by: RADIOLOGY

## 2024-05-29 PROCEDURE — 77386 HC IMRT, COMPLEX: CPT | Performed by: RADIOLOGY

## 2024-05-29 PROCEDURE — 77427 RADIATION TX MANAGEMENT X5: CPT | Mod: ,,, | Performed by: RADIOLOGY

## 2024-05-29 NOTE — PROGRESS NOTES
[facility] MEDICAL ONCOLOGY - FOLLOW UP VISIT    Reason for visit: Adenocarcinoma of the SANJAY      Oncology History   Adenocarcinoma, lung, left   3/19/2024 Imaging Significant Findings    CTA PE (hemoptysis)  - 3.4 x 5.8 cm L hilar mass, probable invasion of distal L main bronchus and SANJAY bronchi     3/19/2024 - 3/22/2024 Hospital Admission    Presented with new onset hemoptysis.  Imaging demonstrated left hilar mass.  Bronchoscopy performed and confirmed adenocarcinoma, EGFR Exon 20 insertion.     3/21/2024 Procedure    Bronch w/ EBUS  SANJAY, Endobronchial Bx  - Adenocarcinoma (CK7 and TTF1 positive; CK20, p40, and CK 5/6 negative)    SANJAY, BAL  - Adenocarcinoma    TEMPUS NGS/PD-L1  - PD-L1 TPS: 35%  - EGFR exon 20 inframe insertion ([id number])  - TP53 misssense ([id number])  - Negative: EGFR, ALK, BRAF, KRAS, ROS1, RET, MET, ERBB2  - TMB 9.5 m/MB     4/3/2024 Imaging Significant Findings    MRI Brain  - 0.8 cm rim enhancing lesion in L thalamus, no significant mass effect      4/11/2024 Imaging Significant Findings    PET CT  - 4.8 x 2.3 cm  L hilar mass, SUV 14  - Adjacent pulmonary nodules, e.g. 1.2 cm  - No intrathoracic LAD     4/12/2024 Cancer Staged    Staging form: Lung, AJCC 8th Edition  - Clinical stage from 4/12/2024: Stage IIIA (cT3, cN1, cM0)     4/30/2024 Procedure    Bronch with EBUS  RUL, EBBx  - Adenocarcinoma (TTF-1 positive, p40 negative)    LN  - Adenocarcinoma    Procedure Note  - Station for L was normal-sized not sampled; no other lymph nodes were visible by EBUS, including station 7, 4R, and 11R     5/9/2024 -  Chemotherapy    Treatment Summary   Plan Name: OP NSCLC PACLITAXEL + CARBOPLATIN (AUC) QW + RADIATION  Treatment Goal: Curative  Status: Active  Start Date: 5/9/2024  End Date: 6/17/2024 (Planned)  Provider: Isacc Nuñez IV, MD  Chemotherapy: CARBOplatin (PARAPLATIN) 225 mg in sodium chloride 0.9% 307.5 mL chemo infusion, 225 mg (100 % of original  dose 227 mg), Intravenous, Clinic/HOD 1 time, 3 of 6 cycles  Dose modification:   (original dose 227 mg, Cycle 1)  Administration: 225 mg (5/9/2024), 245 mg (5/17/2024), 245 mg (5/27/2024)  PACLitaxeL (TAXOL) 45 mg/m2 = 96 mg in sodium chloride 0.9% 250 mL chemo infusion, 45 mg/m2 = 96 mg, Intravenous, Clinic/HOD 1 time, 3 of 6 cycles  Administration: 96 mg (5/9/2024), 96 mg (5/17/2024), 96 mg (5/27/2024)            HPI:     Jordin Allen Jr. is a 78 y.o. male with pmh significant for restless leg syndrome, COPD, CAD s/p stent (2013), HTN, HLD, obesity, GERD, hearing loss requiring hearing aids, and Stage IIIA (T3N1M0) adenocarcinoma of the L hilum (PD-L1 35%, EGFR exon 20 insertion), initially dx'd 3/21/24, currently on CRT (5/8/24-present) with weekly carboplatin/paclitaxel (5/9/24-present), who presents for follow-up.     Last clinic 5/24/24, C3D1 carboplatin/paclitaxel.    Interval History:  - 5/27/24: C3 D1 carboplatin/paclitaxel    Patient states that he has been well since last visit.  He denies any new or bothersome symptoms and remains happy with how well he has been tolerating treatment to this point.  He denies nausea, vomiting, diarrhea, constipation, pain, new/worsening shortness of breath, new/worsening cough, rash, or fatigue.  He notes that he continues to live with his daughter.  He states that today was his 17th radiation treatment (5/30/24).    Past Medical History:   Past Medical History:   Diagnosis Date    Benign neoplasm of colon 10/01/2013    Bronchitis chronic     CAD (coronary artery disease) 10/31/2013    COPD (chronic obstructive pulmonary disease)     Emphysema of lung     GERD (gastroesophageal reflux disease)     Hx of colonic polyps     Hypertension     NAFLD (nonalcoholic fatty liver disease) 03/27/2017    SHOLA on CPAP     RLS (restless legs syndrome)     Screen for colon cancer 08/30/2013        Past Surgical History:   Past Surgical History:   Procedure Laterality Date     bilateral foot surgery      CARDIAC CATHETERIZATION  2013    x1    CATARACT EXTRACTION, BILATERAL      COLON SURGERY      Ace Mott MD    COLONOSCOPY N/A 3/21/2018    Procedure: COLONOSCOPY;  Surgeon: Terry Mott MD;  Location: [facility] ENDO (4TH FLR);  Service: Endoscopy;  Laterality: N/A;    COLONOSCOPY N/A 2019    Procedure: COLONOSCOPY;  Surgeon: John Mantilla MD;  Location: [facility] ENDO (4TH FLR);  Service: Endoscopy;  Laterality: N/A;    COLONOSCOPY N/A 2022    Procedure: COLONOSCOPY;  Surgeon: MEDINA Farris MD;  Location: [facility] ENDO (4TH FLR);  Service: Endoscopy;  Laterality: N/A;  fully vacc-inst portal-tb    ENDOBRONCHIAL ULTRASOUND Bilateral 2024    Procedure: ENDOBRONCHIAL ULTRASOUND (EBUS);  Surgeon: Naveed Aguero MD;  Location: [facility] OR;  Service: Pulmonary;  Laterality: Bilateral;    scrotal abscess removal          Family History:   Family History   Problem Relation Name Age of Onset    Heart disease Mother      Heart attack Mother      Hypertension Mother      No Known Problems Sister      No Known Problems Daughter 2     Asthma Neg Hx      Emphysema Neg Hx      Prostate cancer Neg Hx      Cirrhosis Neg Hx          Social History:   Social History     Tobacco Use    Smoking status: Former     Current packs/day: 0.00     Average packs/day: 1 pack/day for 40.0 years (40.0 ttl pk-yrs)     Types: Cigarettes     Start date: 8/15/1972     Quit date: 8/15/2012     Years since quittin.7     Passive exposure: Never    Smokeless tobacco: Never   Substance Use Topics    Alcohol use: Yes     Alcohol/week: 3.0 standard drinks of alcohol     Types: 3 Cans of beer per week     Comment: maybe 2-3  beers weekly        I have reviewed and updated the patient's past medical, surgical, family and social histories.      ROS:   As per HPI.     Allergies:   Review of patient's allergies indicates:   Allergen Reactions    Brilinta [ticagrelor] Itching    Lisinopril      Other  reaction(s): cough    Metoprolol succinate Rash        Medications:   Current Outpatient Medications   Medication Sig Dispense Refill    albuterol (ACCUNEB) 0.63 mg/3 mL Nebu Take 3 mLs (0.63 mg total) by nebulization every 6 (six) hours as needed (if symptoms failed to improve with inhaler). Rescue 375 mL 11    albuterol (PROVENTIL/VENTOLIN HFA) 90 mcg/actuation inhaler Inhale 2 puffs into the lungs every 4 (four) hours as needed for Wheezing. 8.5 g 11    amitriptyline (ELAVIL) 25 MG tablet Take 1 tablet (25 mg total) by mouth once daily. 90 tablet 1    amLODIPine (NORVASC) 5 MG tablet Take 1 tablet (5 mg total) by mouth once daily. 90 tablet 3    aspirin (ECOTRIN) 81 MG EC tablet Take 81 mg by mouth once daily.       atorvastatin (LIPITOR) 80 MG tablet TAKE 1 TABLET (80 MG TOTAL) BY MOUTH ONCE DAILY. (Patient taking differently: every evening. TAKE 1 TABLET (80 MG TOTAL) BY MOUTH ONCE DAILY.) 90 tablet 3    benzonatate (TESSALON) 200 MG capsule Take 1 capsule (200 mg total) by mouth 3 (three) times daily as needed for Cough. 90 capsule 3    BETA-CAROTENE,A, W-C & E/MIN (OCUVITE ORAL) Take 1 capsule by mouth once daily.       budesonide-glycopyr-formoterol (BREZTRI AEROSPHERE) 160-9-4.8 mcg/actuation HFAA Inhale 2 puffs into the lungs 2 (two) times a day. 10.7 g 3    echinacea 400 mg Cap Take 1 capsule by mouth once daily.       fluticasone propionate (FLONASE) 50 mcg/actuation nasal spray 2 sprays (100 mcg total) by Each Nostril route once daily. 16 g 11    hydroCHLOROthiazide (HYDRODIURIL) 25 MG tablet Take 1 tablet (25 mg total) by mouth once daily. 90 tablet 3    ketoconazole (NIZORAL) 2 % cream Apply topically once daily. 30 g 1    latanoprost 0.005 % ophthalmic solution Place 1 drop into both eyes once daily.       latanoprost 0.005 % ophthalmic solution INSTILL 1 DROP INTO BOTH EYES AT BEDTIME 7.5 mL 3    losartan (COZAAR) 100 MG tablet Take 1 tablet (100 mg total) by mouth once daily. 90 tablet 2    magic  mouthwash diphen/antac/lidoc/nysta Swish and Spit 10 mLs by mouth 4 (four) times daily. 360 mL 3    nitroGLYCERIN (NITROSTAT) 0.4 MG SL tablet Place 1 tablet (0.4 mg total) under the tongue every 5 (five) minutes as needed. 100 tablet 3    OLANZapine (ZYPREXA) 5 MG tablet Take 1 tablet (5 mg total) by mouth every evening. days 1-3 of each chemotherapy cycle. 18 tablet 0    omeprazole (PRILOSEC) 20 MG capsule Take 1 capsule (20 mg total) by mouth once daily. 90 capsule 3    predniSONE (DELTASONE) 5 MG tablet TAKE 1 TABLET BY MOUTH EVERY OTHER DAY. TAKE WITH FOOD 30 tablet 11    prochlorperazine (COMPAZINE) 5 MG tablet Take 1 tablet (5 mg total) by mouth every 6 (six) hours as needed (nausea). 30 tablet 1    roflumilast (DALIRESP) 500 mcg Tab Take 1 tablet (500 mcg total) by mouth once daily. 90 tablet 3    urea (CARMOL) 40 % Crea Apply topically once daily. 85 g 10     No current facility-administered medications for this visit.     Facility-Administered Medications Ordered in Other Visits   Medication Dose Route Frequency Provider Last Rate Last Admin    dextrose 50% injection 12.5 g  12.5 g Intravenous PRN Lidia Deleon MD        dextrose 50% injection 25 g  25 g Intravenous PRN Lidia Deleon MD        insulin regular injection 0-8 Units  0-8 Units Intravenous Continuous PRN Lidia Deleon MD              Physical Exam:       There were no vitals taken for this visit.               Physical Exam  Constitutional:       Appearance: Normal appearance. He is obese.   HENT:      Head: Normocephalic and atraumatic.   Eyes:      Extraocular Movements: Extraocular movements intact.      Conjunctiva/sclera: Conjunctivae normal.      Pupils: Pupils are equal, round, and reactive to light.   Cardiovascular:      Rate and Rhythm: Normal rate and regular rhythm.      Heart sounds: No murmur heard.     No friction rub. No gallop.   Pulmonary:      Effort: Pulmonary effort is normal.      Breath sounds: No wheezing,  rhonchi or rales.   Musculoskeletal:         General: Normal range of motion.      Right lower leg: No edema.      Left lower leg: No edema.   Skin:     General: Skin is warm and dry.      Comments: Bruising over bilateral dorsa of hands   Neurological:      Mental Status: He is alert and oriented to person, place, and time.   Psychiatric:         Mood and Affect: Mood normal.         Thought Content: Thought content normal.         Judgment: Judgment normal.           Labs:   No results found for this or any previous visit (from the past 48 hour(s)).         Imaging:    See oncologic history above.     Path:  See oncologic history above.      Assessment and Plan:     Jordin Allen Jr. is a 78 y.o. male with pmh significant for restless leg syndrome, COPD, CAD s/p stent (2013), HTN, HLD, obesity, GERD, hearing loss requiring hearing aids, and Stage IIIA (T3N1M0) adenocarcinoma of the L hilum (PD-L1 35%, EGFR exon 20 insertion), initially dx'd 3/21/24, currently on CRT (5/8/24-present) with weekly carboplatin/paclitaxel (5/9/24-present), who presents for follow-up.     Stage IIIA Adenocarcinoma of L Hilum, EGFR Exon 20 insertion, PD-L1 35%  ECOG PS 1 (limited by MONTIEL).  Patient is currently on CRT with weekly carboplatin/paclitaxel, tolerating without issue. Most recent imaging is a PET-CT from 4/11/24, prior to treatment start..  Plan to proceed with 6 cycles of weekly carboplatin/paclitaxel in concert with radiation therapy, followed by 1 year immunotherapy maintenance.    PLAN:   -- Proceed with C4D1 carboplatin/paclitaxel   on 6/3/24, labs acceptable and treatment signed  -- Labs and RTC on 6/7/24, C5D1 on 6/10/24  -- Continue RT as scheduled  -- Repeat imaging after completion of CRT      The above information has been reviewed with the patient and all questions have been answered to their apparent satisfaction.  They understand that they can call the clinic with any questions.    Isacc Nuñez,  MD  Hematology/Oncology  [facility]      Route Chart for Scheduling    Med Onc Chart Routing  Urgent    Follow up with physician . Treatment scheduled on 06/03. Labs and RTC on 6/7/24, C5D1 on 6/10/24   Follow up with JARAD    Infusion scheduling note    Injection scheduling note    Labs CBC and CMP   Scheduling:  Preferred lab:  Lab interval:     Imaging    Pharmacy appointment    Other referrals                    Disclaimer: This note was prepared using voice recognition system and is likely to have sound alike errors and is not proof read.  Please call me with any questions.

## 2024-05-30 ENCOUNTER — LAB VISIT (OUTPATIENT)
Dept: LAB | Facility: HOSPITAL | Age: 79
End: 2024-05-30
Attending: HOSPITALIST
Payer: MEDICARE

## 2024-05-30 ENCOUNTER — PATIENT MESSAGE (OUTPATIENT)
Dept: ADMINISTRATIVE | Facility: OTHER | Age: 79
End: 2024-05-30
Payer: MEDICARE

## 2024-05-30 ENCOUNTER — OFFICE VISIT (OUTPATIENT)
Dept: HEMATOLOGY/ONCOLOGY | Facility: CLINIC | Age: 79
End: 2024-05-30
Payer: MEDICARE

## 2024-05-30 VITALS
HEIGHT: 69 IN | BODY MASS INDEX: 33.34 KG/M2 | RESPIRATION RATE: 22 BRPM | OXYGEN SATURATION: 99 % | WEIGHT: 225.06 LBS | DIASTOLIC BLOOD PRESSURE: 84 MMHG | SYSTOLIC BLOOD PRESSURE: 127 MMHG | HEART RATE: 73 BPM

## 2024-05-30 DIAGNOSIS — C34.12 ADENOCARCINOMA OF UPPER LOBE OF LEFT LUNG: ICD-10-CM

## 2024-05-30 DIAGNOSIS — C34.12 ADENOCARCINOMA OF UPPER LOBE OF LEFT LUNG: Primary | ICD-10-CM

## 2024-05-30 DIAGNOSIS — J43.2 CENTRILOBULAR EMPHYSEMA: ICD-10-CM

## 2024-05-30 LAB
ALBUMIN SERPL BCP-MCNC: 3.4 G/DL (ref 3.5–5.2)
ALP SERPL-CCNC: 107 U/L (ref 55–135)
ALT SERPL W/O P-5'-P-CCNC: 24 U/L (ref 10–44)
ANION GAP SERPL CALC-SCNC: 10 MMOL/L (ref 8–16)
AST SERPL-CCNC: 17 U/L (ref 10–40)
BILIRUB SERPL-MCNC: 0.9 MG/DL (ref 0.1–1)
BUN SERPL-MCNC: 19 MG/DL (ref 8–23)
CALCIUM SERPL-MCNC: 9.3 MG/DL (ref 8.7–10.5)
CHLORIDE SERPL-SCNC: 104 MMOL/L (ref 95–110)
CO2 SERPL-SCNC: 30 MMOL/L (ref 23–29)
CREAT SERPL-MCNC: 0.8 MG/DL (ref 0.5–1.4)
ERYTHROCYTE [DISTWIDTH] IN BLOOD BY AUTOMATED COUNT: 15.9 % (ref 11.5–14.5)
EST. GFR  (NO RACE VARIABLE): >60 ML/MIN/1.73 M^2
GLUCOSE SERPL-MCNC: 90 MG/DL (ref 70–110)
HCT VFR BLD AUTO: 40.8 % (ref 40–54)
HGB BLD-MCNC: 12.6 G/DL (ref 14–18)
IMM GRANULOCYTES # BLD AUTO: 0.02 K/UL (ref 0–0.04)
MCH RBC QN AUTO: 26.6 PG (ref 27–31)
MCHC RBC AUTO-ENTMCNC: 30.9 G/DL (ref 32–36)
MCV RBC AUTO: 86 FL (ref 82–98)
NEUTROPHILS # BLD AUTO: 3.8 K/UL (ref 1.8–7.7)
PLATELET # BLD AUTO: 148 K/UL (ref 150–450)
PMV BLD AUTO: 10.6 FL (ref 9.2–12.9)
POTASSIUM SERPL-SCNC: 3.7 MMOL/L (ref 3.5–5.1)
PROT SERPL-MCNC: 6.5 G/DL (ref 6–8.4)
RBC # BLD AUTO: 4.73 M/UL (ref 4.6–6.2)
SODIUM SERPL-SCNC: 144 MMOL/L (ref 136–145)
WBC # BLD AUTO: 5.03 K/UL (ref 3.9–12.7)

## 2024-05-30 PROCEDURE — 99215 OFFICE O/P EST HI 40 MIN: CPT | Mod: S$PBB,,, | Performed by: HOSPITALIST

## 2024-05-30 PROCEDURE — 36415 COLL VENOUS BLD VENIPUNCTURE: CPT | Performed by: HOSPITALIST

## 2024-05-30 PROCEDURE — 77386 HC IMRT, COMPLEX: CPT | Performed by: RADIOLOGY

## 2024-05-30 PROCEDURE — 99214 OFFICE O/P EST MOD 30 MIN: CPT | Mod: PBBFAC | Performed by: HOSPITALIST

## 2024-05-30 PROCEDURE — G2211 COMPLEX E/M VISIT ADD ON: HCPCS | Mod: S$PBB,,, | Performed by: HOSPITALIST

## 2024-05-30 PROCEDURE — G6002 STEREOSCOPIC X-RAY GUIDANCE: HCPCS | Mod: 26,,, | Performed by: RADIOLOGY

## 2024-05-30 PROCEDURE — 85027 COMPLETE CBC AUTOMATED: CPT | Performed by: HOSPITALIST

## 2024-05-30 PROCEDURE — 99999 PR PBB SHADOW E&M-EST. PATIENT-LVL IV: CPT | Mod: PBBFAC,,, | Performed by: HOSPITALIST

## 2024-05-30 PROCEDURE — 80053 COMPREHEN METABOLIC PANEL: CPT | Performed by: HOSPITALIST

## 2024-05-30 RX ORDER — FAMOTIDINE 10 MG/ML
20 INJECTION INTRAVENOUS
Status: CANCELLED | OUTPATIENT
Start: 2024-06-03

## 2024-05-30 RX ORDER — EPINEPHRINE 0.3 MG/.3ML
0.3 INJECTION SUBCUTANEOUS ONCE AS NEEDED
Status: CANCELLED | OUTPATIENT
Start: 2024-06-03

## 2024-05-30 RX ORDER — HEPARIN 100 UNIT/ML
500 SYRINGE INTRAVENOUS
Status: CANCELLED | OUTPATIENT
Start: 2024-06-03

## 2024-05-30 RX ORDER — SODIUM CHLORIDE 0.9 % (FLUSH) 0.9 %
10 SYRINGE (ML) INJECTION
Status: CANCELLED | OUTPATIENT
Start: 2024-06-03

## 2024-05-30 RX ORDER — DIPHENHYDRAMINE HYDROCHLORIDE 50 MG/ML
50 INJECTION INTRAMUSCULAR; INTRAVENOUS ONCE AS NEEDED
Status: CANCELLED | OUTPATIENT
Start: 2024-06-03

## 2024-05-30 RX ORDER — PROCHLORPERAZINE EDISYLATE 5 MG/ML
5 INJECTION INTRAMUSCULAR; INTRAVENOUS ONCE AS NEEDED
Status: CANCELLED
Start: 2024-06-03

## 2024-05-31 ENCOUNTER — EXTERNAL CHRONIC CARE MANAGEMENT (OUTPATIENT)
Dept: PRIMARY CARE CLINIC | Facility: CLINIC | Age: 79
End: 2024-05-31
Payer: MEDICARE

## 2024-05-31 PROCEDURE — 99490 CHRNC CARE MGMT STAFF 1ST 20: CPT | Mod: PBBFAC,PO | Performed by: INTERNAL MEDICINE

## 2024-05-31 PROCEDURE — 99439 CHRNC CARE MGMT STAF EA ADDL: CPT | Mod: S$PBB,,, | Performed by: INTERNAL MEDICINE

## 2024-05-31 PROCEDURE — 99439 CHRNC CARE MGMT STAF EA ADDL: CPT | Mod: PBBFAC,PO | Performed by: INTERNAL MEDICINE

## 2024-05-31 PROCEDURE — 99490 CHRNC CARE MGMT STAFF 1ST 20: CPT | Mod: S$PBB,,, | Performed by: INTERNAL MEDICINE

## 2024-05-31 PROCEDURE — 77386 HC IMRT, COMPLEX: CPT | Performed by: RADIOLOGY

## 2024-05-31 PROCEDURE — G6002 STEREOSCOPIC X-RAY GUIDANCE: HCPCS | Mod: 26,,, | Performed by: RADIOLOGY

## 2024-06-03 ENCOUNTER — HOSPITAL ENCOUNTER (OUTPATIENT)
Dept: RADIATION THERAPY | Facility: HOSPITAL | Age: 79
Discharge: HOME | End: 2024-06-03
Attending: RADIOLOGY
Payer: MEDICARE

## 2024-06-03 ENCOUNTER — PATIENT MESSAGE (OUTPATIENT)
Dept: ADMINISTRATIVE | Facility: OTHER | Age: 79
End: 2024-06-03
Payer: MEDICARE

## 2024-06-03 ENCOUNTER — INFUSION (OUTPATIENT)
Dept: INFUSION THERAPY | Facility: HOSPITAL | Age: 79
End: 2024-06-03
Payer: MEDICARE

## 2024-06-03 VITALS
RESPIRATION RATE: 19 BRPM | BODY MASS INDEX: 32.82 KG/M2 | WEIGHT: 221.56 LBS | DIASTOLIC BLOOD PRESSURE: 77 MMHG | HEART RATE: 86 BPM | OXYGEN SATURATION: 96 % | TEMPERATURE: 98 F | SYSTOLIC BLOOD PRESSURE: 132 MMHG | HEIGHT: 69 IN

## 2024-06-03 DIAGNOSIS — C34.92 ADENOCARCINOMA, LUNG, LEFT: Primary | ICD-10-CM

## 2024-06-03 PROCEDURE — 96413 CHEMO IV INFUSION 1 HR: CPT

## 2024-06-03 PROCEDURE — 96375 TX/PRO/DX INJ NEW DRUG ADDON: CPT

## 2024-06-03 PROCEDURE — 63600175 PHARM REV CODE 636 W HCPCS: Performed by: HOSPITALIST

## 2024-06-03 PROCEDURE — 96367 TX/PROPH/DG ADDL SEQ IV INF: CPT

## 2024-06-03 PROCEDURE — 77386 HC IMRT, COMPLEX: CPT | Performed by: RADIOLOGY

## 2024-06-03 PROCEDURE — 96417 CHEMO IV INFUS EACH ADDL SEQ: CPT

## 2024-06-03 PROCEDURE — 25000003 PHARM REV CODE 250: Performed by: HOSPITALIST

## 2024-06-03 PROCEDURE — 77014 PR  CT GUIDANCE PLACEMENT RAD THERAPY FIELDS: CPT | Mod: 26,,, | Performed by: RADIOLOGY

## 2024-06-03 RX ORDER — PROCHLORPERAZINE EDISYLATE 5 MG/ML
5 INJECTION INTRAMUSCULAR; INTRAVENOUS ONCE AS NEEDED
Status: DISCONTINUED | OUTPATIENT
Start: 2024-06-03 | End: 2024-06-03 | Stop reason: HOSPADM

## 2024-06-03 RX ORDER — DIPHENHYDRAMINE HYDROCHLORIDE 50 MG/ML
50 INJECTION INTRAMUSCULAR; INTRAVENOUS ONCE AS NEEDED
Status: DISCONTINUED | OUTPATIENT
Start: 2024-06-03 | End: 2024-06-03 | Stop reason: HOSPADM

## 2024-06-03 RX ORDER — HEPARIN 100 UNIT/ML
500 SYRINGE INTRAVENOUS
Status: DISCONTINUED | OUTPATIENT
Start: 2024-06-03 | End: 2024-06-03 | Stop reason: HOSPADM

## 2024-06-03 RX ORDER — FAMOTIDINE 10 MG/ML
20 INJECTION INTRAVENOUS
Status: COMPLETED | OUTPATIENT
Start: 2024-06-03 | End: 2024-06-03

## 2024-06-03 RX ORDER — EPINEPHRINE 0.3 MG/.3ML
0.3 INJECTION SUBCUTANEOUS ONCE AS NEEDED
Status: DISCONTINUED | OUTPATIENT
Start: 2024-06-03 | End: 2024-06-03 | Stop reason: HOSPADM

## 2024-06-03 RX ORDER — PREDNISONE 5 MG/1
TABLET ORAL
Qty: 30 TABLET | Refills: 11 | OUTPATIENT
Start: 2024-06-03

## 2024-06-03 RX ORDER — SODIUM CHLORIDE 0.9 % (FLUSH) 0.9 %
10 SYRINGE (ML) INJECTION
Status: DISCONTINUED | OUTPATIENT
Start: 2024-06-03 | End: 2024-06-03 | Stop reason: HOSPADM

## 2024-06-03 RX ADMIN — CARBOPLATIN 270 MG: 10 INJECTION, SOLUTION INTRAVENOUS at 09:06

## 2024-06-03 RX ADMIN — SODIUM CHLORIDE: 9 INJECTION, SOLUTION INTRAVENOUS at 07:06

## 2024-06-03 RX ADMIN — DIPHENHYDRAMINE HYDROCHLORIDE 50 MG: 50 INJECTION, SOLUTION INTRAMUSCULAR; INTRAVENOUS at 08:06

## 2024-06-03 RX ADMIN — FAMOTIDINE 20 MG: 10 INJECTION INTRAVENOUS at 07:06

## 2024-06-03 RX ADMIN — DEXAMETHASONE SODIUM PHOSPHATE 0.25 MG: 4 INJECTION, SOLUTION INTRA-ARTICULAR; INTRALESIONAL; INTRAMUSCULAR; INTRAVENOUS; SOFT TISSUE at 07:06

## 2024-06-03 RX ADMIN — PACLITAXEL 96 MG: 6 INJECTION, SOLUTION INTRAVENOUS at 08:06

## 2024-06-03 NOTE — PLAN OF CARE
Pt ambulatory to clinic alone for C4 Taxol 1 hour and Carbo. Denies any sig complaints. Beginning to have pain with swallowing probably related to radiation. Meeting with Radiation oncologist tomorrow and knows to discuss this with him. No difficulty swallowing here in clinic. PIV started without difficulty. Pt tolerated infusions well. PIV removed with catheter intact. Ambulatory from clinic to radiation in NAD.

## 2024-06-04 ENCOUNTER — DOCUMENTATION ONLY (OUTPATIENT)
Dept: RADIATION ONCOLOGY | Facility: CLINIC | Age: 79
End: 2024-06-04
Payer: MEDICARE

## 2024-06-04 PROCEDURE — 77417 THER RADIOLOGY PORT IMAGE(S): CPT | Performed by: RADIOLOGY

## 2024-06-04 PROCEDURE — 77386 HC IMRT, COMPLEX: CPT | Performed by: RADIOLOGY

## 2024-06-04 PROCEDURE — G6002 STEREOSCOPIC X-RAY GUIDANCE: HCPCS | Mod: 26,,, | Performed by: RADIOLOGY

## 2024-06-04 PROCEDURE — 77336 RADIATION PHYSICS CONSULT: CPT | Performed by: RADIOLOGY

## 2024-06-04 NOTE — PLAN OF CARE
Day 20 of outpatient radiation to the left lung. Reports some sore throat. Does not feel magic mouthwash works for him. Experiences shortness of breath with long distance walking.

## 2024-06-05 ENCOUNTER — PATIENT MESSAGE (OUTPATIENT)
Dept: ADMINISTRATIVE | Facility: OTHER | Age: 79
End: 2024-06-05
Payer: MEDICARE

## 2024-06-05 PROCEDURE — 77427 RADIATION TX MANAGEMENT X5: CPT | Mod: ,,, | Performed by: RADIOLOGY

## 2024-06-05 PROCEDURE — 77386 HC IMRT, COMPLEX: CPT | Performed by: RADIOLOGY

## 2024-06-05 PROCEDURE — G6002 STEREOSCOPIC X-RAY GUIDANCE: HCPCS | Mod: 26,,, | Performed by: RADIOLOGY

## 2024-06-06 ENCOUNTER — OFFICE VISIT (OUTPATIENT)
Dept: CARDIOLOGY | Facility: CLINIC | Age: 79
End: 2024-06-06
Payer: MEDICARE

## 2024-06-06 ENCOUNTER — TELEPHONE (OUTPATIENT)
Dept: CARDIOLOGY | Facility: HOSPITAL | Age: 79
End: 2024-06-06
Payer: MEDICARE

## 2024-06-06 ENCOUNTER — LAB VISIT (OUTPATIENT)
Dept: LAB | Facility: HOSPITAL | Age: 79
End: 2024-06-06
Attending: HOSPITALIST
Payer: MEDICARE

## 2024-06-06 ENCOUNTER — PATIENT MESSAGE (OUTPATIENT)
Dept: HEMATOLOGY/ONCOLOGY | Facility: CLINIC | Age: 79
End: 2024-06-06

## 2024-06-06 ENCOUNTER — OFFICE VISIT (OUTPATIENT)
Dept: HEMATOLOGY/ONCOLOGY | Facility: CLINIC | Age: 79
End: 2024-06-06
Payer: MEDICARE

## 2024-06-06 VITALS
TEMPERATURE: 98 F | SYSTOLIC BLOOD PRESSURE: 123 MMHG | BODY MASS INDEX: 33.65 KG/M2 | DIASTOLIC BLOOD PRESSURE: 84 MMHG | HEIGHT: 68 IN | WEIGHT: 222 LBS | OXYGEN SATURATION: 96 % | RESPIRATION RATE: 16 BRPM | HEART RATE: 90 BPM

## 2024-06-06 VITALS
HEIGHT: 69 IN | OXYGEN SATURATION: 99 % | DIASTOLIC BLOOD PRESSURE: 60 MMHG | WEIGHT: 223.13 LBS | HEART RATE: 90 BPM | SYSTOLIC BLOOD PRESSURE: 100 MMHG | BODY MASS INDEX: 33.05 KG/M2

## 2024-06-06 DIAGNOSIS — E66.09 CLASS 1 OBESITY DUE TO EXCESS CALORIES WITH SERIOUS COMORBIDITY AND BODY MASS INDEX (BMI) OF 33.0 TO 33.9 IN ADULT: ICD-10-CM

## 2024-06-06 DIAGNOSIS — C34.12 ADENOCARCINOMA OF UPPER LOBE OF LEFT LUNG: ICD-10-CM

## 2024-06-06 DIAGNOSIS — C34.92 ADENOCARCINOMA, LUNG, LEFT: ICD-10-CM

## 2024-06-06 DIAGNOSIS — I10 HTN (HYPERTENSION), BENIGN: ICD-10-CM

## 2024-06-06 DIAGNOSIS — E78.5 DYSLIPIDEMIA: ICD-10-CM

## 2024-06-06 DIAGNOSIS — I25.10 CORONARY ARTERY DISEASE INVOLVING NATIVE CORONARY ARTERY OF NATIVE HEART WITHOUT ANGINA PECTORIS: Primary | Chronic | ICD-10-CM

## 2024-06-06 DIAGNOSIS — I49.9 IRREGULAR HEARTBEAT: ICD-10-CM

## 2024-06-06 DIAGNOSIS — Z87.891 HISTORY OF SMOKING 30 OR MORE PACK YEARS: ICD-10-CM

## 2024-06-06 DIAGNOSIS — C34.12 ADENOCARCINOMA OF UPPER LOBE OF LEFT LUNG: Primary | ICD-10-CM

## 2024-06-06 LAB
ALBUMIN SERPL BCP-MCNC: 3.5 G/DL (ref 3.5–5.2)
ALP SERPL-CCNC: 109 U/L (ref 55–135)
ALT SERPL W/O P-5'-P-CCNC: 22 U/L (ref 10–44)
ANION GAP SERPL CALC-SCNC: 10 MMOL/L (ref 8–16)
AST SERPL-CCNC: 16 U/L (ref 10–40)
BILIRUB SERPL-MCNC: 1.5 MG/DL (ref 0.1–1)
BUN SERPL-MCNC: 23 MG/DL (ref 8–23)
CALCIUM SERPL-MCNC: 9.7 MG/DL (ref 8.7–10.5)
CHLORIDE SERPL-SCNC: 104 MMOL/L (ref 95–110)
CO2 SERPL-SCNC: 28 MMOL/L (ref 23–29)
CREAT SERPL-MCNC: 0.9 MG/DL (ref 0.5–1.4)
ERYTHROCYTE [DISTWIDTH] IN BLOOD BY AUTOMATED COUNT: 16 % (ref 11.5–14.5)
EST. GFR  (NO RACE VARIABLE): >60 ML/MIN/1.73 M^2
GLUCOSE SERPL-MCNC: 118 MG/DL (ref 70–110)
HCT VFR BLD AUTO: 41.5 % (ref 40–54)
HGB BLD-MCNC: 12.7 G/DL (ref 14–18)
IMM GRANULOCYTES # BLD AUTO: 0.04 K/UL (ref 0–0.04)
MCH RBC QN AUTO: 26.9 PG (ref 27–31)
MCHC RBC AUTO-ENTMCNC: 30.6 G/DL (ref 32–36)
MCV RBC AUTO: 88 FL (ref 82–98)
NEUTROPHILS # BLD AUTO: 5 K/UL (ref 1.8–7.7)
OHS QRS DURATION: 80 MS
OHS QTC CALCULATION: 469 MS
PLATELET # BLD AUTO: 106 K/UL (ref 150–450)
PMV BLD AUTO: 10.7 FL (ref 9.2–12.9)
POTASSIUM SERPL-SCNC: 3.7 MMOL/L (ref 3.5–5.1)
PROT SERPL-MCNC: 6.7 G/DL (ref 6–8.4)
RBC # BLD AUTO: 4.72 M/UL (ref 4.6–6.2)
SODIUM SERPL-SCNC: 142 MMOL/L (ref 136–145)
WBC # BLD AUTO: 6.1 K/UL (ref 3.9–12.7)

## 2024-06-06 PROCEDURE — 80053 COMPREHEN METABOLIC PANEL: CPT | Performed by: HOSPITALIST

## 2024-06-06 PROCEDURE — 99214 OFFICE O/P EST MOD 30 MIN: CPT | Mod: PBBFAC,25 | Performed by: HOSPITALIST

## 2024-06-06 PROCEDURE — 77386 HC IMRT, COMPLEX: CPT | Performed by: RADIOLOGY

## 2024-06-06 PROCEDURE — 93005 ELECTROCARDIOGRAM TRACING: CPT | Mod: PBBFAC | Performed by: INTERNAL MEDICINE

## 2024-06-06 PROCEDURE — 99999 PR PBB SHADOW E&M-EST. PATIENT-LVL IV: CPT | Mod: PBBFAC,,, | Performed by: HOSPITALIST

## 2024-06-06 PROCEDURE — 99214 OFFICE O/P EST MOD 30 MIN: CPT | Mod: S$PBB,,, | Performed by: INTERNAL MEDICINE

## 2024-06-06 PROCEDURE — 99215 OFFICE O/P EST HI 40 MIN: CPT | Mod: S$PBB,,, | Performed by: HOSPITALIST

## 2024-06-06 PROCEDURE — 85027 COMPLETE CBC AUTOMATED: CPT | Performed by: HOSPITALIST

## 2024-06-06 PROCEDURE — 77014 PR  CT GUIDANCE PLACEMENT RAD THERAPY FIELDS: CPT | Mod: 26,,, | Performed by: RADIOLOGY

## 2024-06-06 PROCEDURE — 99999 PR PBB SHADOW E&M-EST. PATIENT-LVL V: CPT | Mod: PBBFAC,,, | Performed by: INTERNAL MEDICINE

## 2024-06-06 PROCEDURE — 99215 OFFICE O/P EST HI 40 MIN: CPT | Mod: PBBFAC,27 | Performed by: INTERNAL MEDICINE

## 2024-06-06 PROCEDURE — 93010 ELECTROCARDIOGRAM REPORT: CPT | Mod: S$PBB,,, | Performed by: INTERNAL MEDICINE

## 2024-06-06 PROCEDURE — G2211 COMPLEX E/M VISIT ADD ON: HCPCS | Mod: S$PBB,,, | Performed by: HOSPITALIST

## 2024-06-06 PROCEDURE — 36415 COLL VENOUS BLD VENIPUNCTURE: CPT | Performed by: HOSPITALIST

## 2024-06-06 NOTE — PROGRESS NOTES
[facility] MEDICAL ONCOLOGY - FOLLOW UP VISIT    Reason for visit: Adenocarcinoma of the SANJAY      Oncology History   Adenocarcinoma, lung, left   3/19/2024 Imaging Significant Findings    CTA PE (hemoptysis)  - 3.4 x 5.8 cm L hilar mass, probable invasion of distal L main bronchus and SANJAY bronchi     3/19/2024 - 3/22/2024 Hospital Admission    Presented with new onset hemoptysis.  Imaging demonstrated left hilar mass.  Bronchoscopy performed and confirmed adenocarcinoma, EGFR Exon 20 insertion.     3/21/2024 Procedure    Bronch w/ EBUS  SANJAY, Endobronchial Bx  - Adenocarcinoma (CK7 and TTF1 positive; CK20, p40, and CK 5/6 negative)    SANJAY, BAL  - Adenocarcinoma    TEMPUS NGS/PD-L1  - PD-L1 TPS: 35%  - EGFR exon 20 inframe insertion ([id number])  - TP53 misssense (V157F)  - Negative: EGFR, ALK, BRAF, KRAS, ROS1, RET, MET, ERBB2  - TMB 9.5 m/MB     4/3/2024 Imaging Significant Findings    MRI Brain  - 0.8 cm rim enhancing lesion in L thalamus, no significant mass effect      4/11/2024 Imaging Significant Findings    PET CT  - 4.8 x 2.3 cm  L hilar mass, SUV 14  - Adjacent pulmonary nodules, e.g. 1.2 cm  - No intrathoracic LAD     4/12/2024 Cancer Staged    Staging form: Lung, AJCC 8th Edition  - Clinical stage from 4/12/2024: Stage IIIA (cT3, cN1, cM0)     4/30/2024 Procedure    Bronch with EBUS  RUL, EBBx  - Adenocarcinoma (TTF-1 positive, p40 negative)    LN  - Adenocarcinoma    Procedure Note  - Station for L was normal-sized not sampled; no other lymph nodes were visible by EBUS, including station 7, 4R, and 11R     5/9/2024 -  Chemotherapy    Treatment Summary   Plan Name: OP NSCLC PACLITAXEL + CARBOPLATIN (AUC) QW + RADIATION  Treatment Goal: Curative  Status: Active  Start Date: 5/9/2024  End Date: 6/17/2024 (Planned)  Provider: Isacc Nueñz IV, MD  Chemotherapy: CARBOplatin (PARAPLATIN) 225 mg in sodium chloride 0.9% 307.5 mL chemo infusion, 225 mg (100 % of original  dose 227 mg), Intravenous, Clinic/HOD 1 time, 4 of 6 cycles  Dose modification:   (original dose 227 mg, Cycle 1)  Administration: 225 mg (5/9/2024), 245 mg (5/17/2024), 245 mg (5/27/2024), 270 mg (6/3/2024)  PACLitaxeL (TAXOL) 45 mg/m2 = 96 mg in sodium chloride 0.9% 250 mL chemo infusion, 45 mg/m2 = 96 mg, Intravenous, Clinic/HOD 1 time, 4 of 6 cycles  Administration: 96 mg (5/9/2024), 96 mg (5/17/2024), 96 mg (5/27/2024), 96 mg (6/3/2024)            HPI:     Jordin Allen Jr. is a 78 y.o. male with pmh significant for restless leg syndrome, COPD, CAD s/p stent (2013), HTN, HLD, obesity, GERD, hearing loss requiring hearing aids, and Stage IIIA (T3N1M0) adenocarcinoma of the L hilum (PD-L1 35%, EGFR exon 20 insertion), initially dx'd 3/21/24, currently on CRT (5/8/24-present) with weekly carboplatin/paclitaxel (5/9/24-present), who presents for follow-up.         Interval History:  - 6/3/24: C4D1 carboplatin/paclitaxel    Patient states that he has been well since last visit.  He denies any new or bothersome symptoms and remains happy with how well he has been tolerating treatment to this point.  He denies nausea, vomiting, diarrhea, constipation, pain, new/worsening shortness of breath, new/worsening cough, rash, or fatigue.  He has a cardiology appointment today and then said he has a cardiac PET scan on Monday; discussed that he should related to his cardiologist that he is receiving radiation therapy.  He states that his last radiation treatment will be 6/18/24.    Past Medical History:   Past Medical History:   Diagnosis Date    Benign neoplasm of colon 10/01/2013    Bronchitis chronic     CAD (coronary artery disease) 10/31/2013    COPD (chronic obstructive pulmonary disease)     Emphysema of lung     GERD (gastroesophageal reflux disease)     Hx of colonic polyps     Hypertension     NAFLD (nonalcoholic fatty liver disease) 03/27/2017    SHOLA on CPAP     RLS (restless legs syndrome)     Screen for colon  cancer 2013        Past Surgical History:   Past Surgical History:   Procedure Laterality Date    bilateral foot surgery      CARDIAC CATHETERIZATION  2013    x1    CATARACT EXTRACTION, BILATERAL      COLON SURGERY      Ace Mott MD    COLONOSCOPY N/A 3/21/2018    Procedure: COLONOSCOPY;  Surgeon: Terry Mott MD;  Location: [facility] ENDO (4TH FLR);  Service: Endoscopy;  Laterality: N/A;    COLONOSCOPY N/A 2019    Procedure: COLONOSCOPY;  Surgeon: John Mantilla MD;  Location: [facility] ENDO (4TH FLR);  Service: Endoscopy;  Laterality: N/A;    COLONOSCOPY N/A 2022    Procedure: COLONOSCOPY;  Surgeon: MEDINA Farris MD;  Location: [facility] ENDO (4TH FLR);  Service: Endoscopy;  Laterality: N/A;  fully vacc-inst portal-tb    ENDOBRONCHIAL ULTRASOUND Bilateral 2024    Procedure: ENDOBRONCHIAL ULTRASOUND (EBUS);  Surgeon: Naveed Aguero MD;  Location: [facility] [place] OR;  Service: Pulmonary;  Laterality: Bilateral;    scrotal abscess removal          Family History:   Family History   Problem Relation Name Age of Onset    Heart disease Mother      Heart attack Mother      Hypertension Mother      No Known Problems Sister      No Known Problems Daughter 2     Asthma Neg Hx      Emphysema Neg Hx      Prostate cancer Neg Hx      Cirrhosis Neg Hx          Social History:   Social History     Tobacco Use    Smoking status: Former     Current packs/day: 0.00     Average packs/day: 1 pack/day for 40.0 years (40.0 ttl pk-yrs)     Types: Cigarettes     Start date: 8/15/1972     Quit date: 8/15/2012     Years since quittin.8     Passive exposure: Never    Smokeless tobacco: Never   Substance Use Topics    Alcohol use: Yes     Alcohol/week: 3.0 standard drinks of alcohol     Types: 3 Cans of beer per week     Comment: maybe 2-3  beers weekly        I have reviewed and updated the patient's past medical, surgical, family and social histories.      ROS:   As per HPI.     Allergies:   Review of patient's  allergies indicates:   Allergen Reactions    Brilinta [ticagrelor] Itching    Lisinopril      Other reaction(s): cough    Metoprolol succinate Rash        Medications:   Current Outpatient Medications   Medication Sig Dispense Refill    albuterol (ACCUNEB) 0.63 mg/3 mL Nebu Take 3 mLs (0.63 mg total) by nebulization every 6 (six) hours as needed (if symptoms failed to improve with inhaler). Rescue 375 mL 11    albuterol (PROVENTIL/VENTOLIN HFA) 90 mcg/actuation inhaler Inhale 2 puffs into the lungs every 4 (four) hours as needed for Wheezing. 8.5 g 11    amitriptyline (ELAVIL) 25 MG tablet Take 1 tablet (25 mg total) by mouth once daily. 90 tablet 1    amLODIPine (NORVASC) 5 MG tablet Take 1 tablet (5 mg total) by mouth once daily. 90 tablet 3    aspirin (ECOTRIN) 81 MG EC tablet Take 81 mg by mouth once daily.       atorvastatin (LIPITOR) 80 MG tablet TAKE 1 TABLET (80 MG TOTAL) BY MOUTH ONCE DAILY. (Patient taking differently: every evening. TAKE 1 TABLET (80 MG TOTAL) BY MOUTH ONCE DAILY.) 90 tablet 3    BETA-CAROTENE,A, W-C & E/MIN (OCUVITE ORAL) Take 1 capsule by mouth once daily.       budesonide-glycopyr-formoterol (BREZTRI AEROSPHERE) 160-9-4.8 mcg/actuation HFAA Inhale 2 puffs into the lungs 2 (two) times a day. 10.7 g 3    echinacea 400 mg Cap Take 1 capsule by mouth once daily.       fluticasone propionate (FLONASE) 50 mcg/actuation nasal spray 2 sprays (100 mcg total) by Each Nostril route once daily. 16 g 11    hydroCHLOROthiazide (HYDRODIURIL) 25 MG tablet Take 1 tablet (25 mg total) by mouth once daily. 90 tablet 3    ketoconazole (NIZORAL) 2 % cream Apply topically once daily. 30 g 1    latanoprost 0.005 % ophthalmic solution Place 1 drop into both eyes once daily.       latanoprost 0.005 % ophthalmic solution INSTILL 1 DROP INTO BOTH EYES AT BEDTIME 7.5 mL 3    losartan (COZAAR) 100 MG tablet Take 1 tablet (100 mg total) by mouth once daily. 90 tablet 2    magic mouthwash diphen/antac/lidoc/nysta  "Swish and Spit 10 mLs by mouth 4 (four) times daily. 360 mL 3    nitroGLYCERIN (NITROSTAT) 0.4 MG SL tablet Place 1 tablet (0.4 mg total) under the tongue every 5 (five) minutes as needed. 100 tablet 3    OLANZapine (ZYPREXA) 5 MG tablet Take 1 tablet (5 mg total) by mouth every evening. days 1-3 of each chemotherapy cycle. 18 tablet 0    omeprazole (PRILOSEC) 20 MG capsule Take 1 capsule (20 mg total) by mouth once daily. 90 capsule 3    predniSONE (DELTASONE) 5 MG tablet TAKE 1 TABLET BY MOUTH EVERY OTHER DAY. TAKE WITH FOOD 30 tablet 11    prochlorperazine (COMPAZINE) 5 MG tablet Take 1 tablet (5 mg total) by mouth every 6 (six) hours as needed (nausea). 30 tablet 1    roflumilast (DALIRESP) 500 mcg Tab Take 1 tablet (500 mcg total) by mouth once daily. 90 tablet 3    urea (CARMOL) 40 % Crea Apply topically once daily. 85 g 10    benzonatate (TESSALON) 200 MG capsule Take 1 capsule (200 mg total) by mouth 3 (three) times daily as needed for Cough. 90 capsule 3     No current facility-administered medications for this visit.     Facility-Administered Medications Ordered in Other Visits   Medication Dose Route Frequency Provider Last Rate Last Admin    dextrose 50% injection 12.5 g  12.5 g Intravenous PRN Lidia Deleon MD        dextrose 50% injection 25 g  25 g Intravenous PRN Lidia Deleon MD        insulin regular injection 0-8 Units  0-8 Units Intravenous Continuous PRN Lidia Deleon MD              Physical Exam:       /84 (BP Location: Left arm, Patient Position: Sitting, BP Method: Medium (Automatic))   Pulse 90   Temp 97.8 °F (36.6 °C) (Oral)   Resp 16   Ht 5' 8" (1.727 m)   Wt 100.7 kg (222 lb 0.1 oz)   SpO2 96%   BMI 33.76 kg/m²                Physical Exam  Constitutional:       Appearance: Normal appearance. He is obese.   HENT:      Head: Normocephalic and atraumatic.   Eyes:      Extraocular Movements: Extraocular movements intact.      Conjunctiva/sclera: Conjunctivae " normal.      Pupils: Pupils are equal, round, and reactive to light.   Cardiovascular:      Rate and Rhythm: Normal rate and regular rhythm.      Heart sounds: No murmur heard.     No friction rub. No gallop.   Pulmonary:      Effort: Pulmonary effort is normal.      Breath sounds: No wheezing, rhonchi or rales.   Musculoskeletal:         General: Normal range of motion.      Right lower leg: No edema.      Left lower leg: No edema.   Skin:     General: Skin is warm and dry.      Comments: Bruising over bilateral dorsa of hands   Neurological:      Mental Status: He is alert and oriented to person, place, and time.   Psychiatric:         Mood and Affect: Mood normal.         Thought Content: Thought content normal.         Judgment: Judgment normal.           Labs:   No results found for this or any previous visit (from the past 48 hour(s)).         Imaging:    See oncologic history above.     Path:  See oncologic history above.      Assessment and Plan:     Jordin Allen Jr. is a 78 y.o. male with pmh significant for restless leg syndrome, COPD, CAD s/p stent (2013), HTN, HLD, obesity, GERD, hearing loss requiring hearing aids, and Stage IIIA (T3N1M0) adenocarcinoma of the L hilum (PD-L1 35%, EGFR exon 20 insertion), initially dx'd 3/21/24, currently on CRT (5/8/24-present) with weekly carboplatin/paclitaxel (5/9/24-present), who presents for follow-up.     Stage IIIA Adenocarcinoma of L Hilum, EGFR Exon 20 insertion, PD-L1 35%  ECOG PS 1 (limited by MONTIEL).  Patient is currently on CRT with weekly carboplatin/paclitaxel, tolerating without issue. Most recent imaging is a PET-CT from 4/11/24, prior to treatment start..  Plan to proceed with 6 cycles of weekly carboplatin/paclitaxel in concert with radiation therapy, followed by 1 year immunotherapy maintenance.    PLAN:   -- Proceed with C5D1 carboplatin/paclitaxel on 6/11/24 (pt prefers Tuesday infusion), labs acceptable(Hgb stable at 12.7; plt decreased to 106;  t bili newly elevated to 1.5 but AST/ALT wnl; of note, labs are mid-C4 rather than end of C4). Will repeat labs prior to C5 on 6/11/24 prior to signing.    -- RTC on 6/14/24, Labs on 6/17/24, C6D1 on 6/18/24  -- Continue RT as scheduled  -- Repeat imaging after completion of CRT      The above information has been reviewed with the patient and all questions have been answered to their apparent satisfaction.  They understand that they can call the clinic with any questions.    Isacc Nuñez MD  Hematology/Oncology  [facility] [place]      Route Chart for Scheduling    Med Onc Chart Routing      Follow up with physician . [place] 6/11/24, prior to chemo on 6/11/24. RTC on 6/14/24, Labs on 6/17/24, C6D1 on 6/18/24   Follow up with JARAD    Infusion scheduling note    Injection scheduling note    Labs CBC and CMP   Scheduling:  Preferred lab:  Lab interval:     Imaging    Pharmacy appointment    Other referrals                    Disclaimer: This note was prepared using voice recognition system and is likely to have sound alike errors and is not proof read.  Please call me with any questions.
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
## 2024-06-07 PROCEDURE — 77386 HC IMRT, COMPLEX: CPT | Performed by: RADIOLOGY

## 2024-06-07 PROCEDURE — 77014 PR  CT GUIDANCE PLACEMENT RAD THERAPY FIELDS: CPT | Mod: 26,,, | Performed by: RADIOLOGY

## 2024-06-10 ENCOUNTER — PATIENT MESSAGE (OUTPATIENT)
Dept: ADMINISTRATIVE | Facility: OTHER | Age: 79
End: 2024-06-10
Payer: MEDICARE

## 2024-06-10 PROBLEM — J96.11 CHRONIC RESPIRATORY FAILURE WITH HYPOXIA: Status: RESOLVED | Noted: 2024-03-05 | Resolved: 2024-06-10

## 2024-06-10 PROCEDURE — 77386 HC IMRT, COMPLEX: CPT | Performed by: RADIOLOGY

## 2024-06-10 PROCEDURE — G6002 STEREOSCOPIC X-RAY GUIDANCE: HCPCS | Mod: 26,,, | Performed by: RADIOLOGY

## 2024-06-11 ENCOUNTER — LAB VISIT (OUTPATIENT)
Dept: LAB | Facility: HOSPITAL | Age: 79
End: 2024-06-11
Attending: HOSPITALIST
Payer: MEDICARE

## 2024-06-11 ENCOUNTER — INFUSION (OUTPATIENT)
Dept: INFUSION THERAPY | Facility: HOSPITAL | Age: 79
End: 2024-06-11
Payer: MEDICARE

## 2024-06-11 ENCOUNTER — DOCUMENTATION ONLY (OUTPATIENT)
Dept: RADIATION ONCOLOGY | Facility: CLINIC | Age: 79
End: 2024-06-11
Payer: MEDICARE

## 2024-06-11 VITALS
RESPIRATION RATE: 18 BRPM | HEART RATE: 83 BPM | BODY MASS INDEX: 32.49 KG/M2 | HEIGHT: 69 IN | WEIGHT: 219.38 LBS | OXYGEN SATURATION: 94 % | TEMPERATURE: 98 F | SYSTOLIC BLOOD PRESSURE: 140 MMHG | DIASTOLIC BLOOD PRESSURE: 66 MMHG

## 2024-06-11 DIAGNOSIS — C34.92 ADENOCARCINOMA, LUNG, LEFT: Primary | ICD-10-CM

## 2024-06-11 DIAGNOSIS — C34.12 ADENOCARCINOMA OF UPPER LOBE OF LEFT LUNG: ICD-10-CM

## 2024-06-11 LAB
ALBUMIN SERPL BCP-MCNC: 3.3 G/DL (ref 3.5–5.2)
ALP SERPL-CCNC: 95 U/L (ref 55–135)
ALT SERPL W/O P-5'-P-CCNC: 21 U/L (ref 10–44)
ANION GAP SERPL CALC-SCNC: 8 MMOL/L (ref 8–16)
AST SERPL-CCNC: 17 U/L (ref 10–40)
BILIRUB SERPL-MCNC: 1.2 MG/DL (ref 0.1–1)
BUN SERPL-MCNC: 20 MG/DL (ref 8–23)
CALCIUM SERPL-MCNC: 9.3 MG/DL (ref 8.7–10.5)
CHLORIDE SERPL-SCNC: 105 MMOL/L (ref 95–110)
CO2 SERPL-SCNC: 28 MMOL/L (ref 23–29)
CREAT SERPL-MCNC: 0.9 MG/DL (ref 0.5–1.4)
ERYTHROCYTE [DISTWIDTH] IN BLOOD BY AUTOMATED COUNT: 15.3 % (ref 11.5–14.5)
EST. GFR  (NO RACE VARIABLE): >60 ML/MIN/1.73 M^2
GLUCOSE SERPL-MCNC: 126 MG/DL (ref 70–110)
HCT VFR BLD AUTO: 34.2 % (ref 40–54)
HGB BLD-MCNC: 11.1 G/DL (ref 14–18)
IMM GRANULOCYTES # BLD AUTO: 0.05 K/UL (ref 0–0.04)
MAGNESIUM SERPL-MCNC: 1.9 MG/DL (ref 1.6–2.6)
MCH RBC QN AUTO: 27.7 PG (ref 27–31)
MCHC RBC AUTO-ENTMCNC: 32.5 G/DL (ref 32–36)
MCV RBC AUTO: 85 FL (ref 82–98)
NEUTROPHILS # BLD AUTO: 3.3 K/UL (ref 1.8–7.7)
PLATELET # BLD AUTO: 93 K/UL (ref 150–450)
PMV BLD AUTO: 10.4 FL (ref 9.2–12.9)
POTASSIUM SERPL-SCNC: 3.1 MMOL/L (ref 3.5–5.1)
PROT SERPL-MCNC: 6.4 G/DL (ref 6–8.4)
RBC # BLD AUTO: 4.01 M/UL (ref 4.6–6.2)
SODIUM SERPL-SCNC: 141 MMOL/L (ref 136–145)
WBC # BLD AUTO: 4.72 K/UL (ref 3.9–12.7)

## 2024-06-11 PROCEDURE — 77336 RADIATION PHYSICS CONSULT: CPT | Performed by: RADIOLOGY

## 2024-06-11 PROCEDURE — 77386 HC IMRT, COMPLEX: CPT | Performed by: RADIOLOGY

## 2024-06-11 PROCEDURE — 96413 CHEMO IV INFUSION 1 HR: CPT

## 2024-06-11 PROCEDURE — 96367 TX/PROPH/DG ADDL SEQ IV INF: CPT

## 2024-06-11 PROCEDURE — 85027 COMPLETE CBC AUTOMATED: CPT | Performed by: HOSPITALIST

## 2024-06-11 PROCEDURE — 83735 ASSAY OF MAGNESIUM: CPT | Performed by: HOSPITALIST

## 2024-06-11 PROCEDURE — G6002 STEREOSCOPIC X-RAY GUIDANCE: HCPCS | Mod: 26,,, | Performed by: RADIOLOGY

## 2024-06-11 PROCEDURE — 80053 COMPREHEN METABOLIC PANEL: CPT | Performed by: HOSPITALIST

## 2024-06-11 PROCEDURE — 25000003 PHARM REV CODE 250: Performed by: HOSPITALIST

## 2024-06-11 PROCEDURE — 96375 TX/PRO/DX INJ NEW DRUG ADDON: CPT

## 2024-06-11 PROCEDURE — 96417 CHEMO IV INFUS EACH ADDL SEQ: CPT

## 2024-06-11 PROCEDURE — 63600175 PHARM REV CODE 636 W HCPCS: Performed by: HOSPITALIST

## 2024-06-11 PROCEDURE — 36415 COLL VENOUS BLD VENIPUNCTURE: CPT | Performed by: HOSPITALIST

## 2024-06-11 RX ORDER — PROCHLORPERAZINE EDISYLATE 5 MG/ML
5 INJECTION INTRAMUSCULAR; INTRAVENOUS ONCE AS NEEDED
Status: DISCONTINUED | OUTPATIENT
Start: 2024-06-11 | End: 2024-06-11 | Stop reason: HOSPADM

## 2024-06-11 RX ORDER — SODIUM CHLORIDE 0.9 % (FLUSH) 0.9 %
10 SYRINGE (ML) INJECTION
Status: DISCONTINUED | OUTPATIENT
Start: 2024-06-11 | End: 2024-06-11 | Stop reason: HOSPADM

## 2024-06-11 RX ORDER — PROCHLORPERAZINE EDISYLATE 5 MG/ML
5 INJECTION INTRAMUSCULAR; INTRAVENOUS ONCE AS NEEDED
Status: CANCELLED
Start: 2024-06-11

## 2024-06-11 RX ORDER — POTASSIUM CHLORIDE 20 MEQ/1
40 TABLET, EXTENDED RELEASE ORAL
Status: CANCELLED
Start: 2024-06-11

## 2024-06-11 RX ORDER — FAMOTIDINE 10 MG/ML
20 INJECTION INTRAVENOUS
Status: CANCELLED | OUTPATIENT
Start: 2024-06-11

## 2024-06-11 RX ORDER — SODIUM CHLORIDE 0.9 % (FLUSH) 0.9 %
10 SYRINGE (ML) INJECTION
Status: CANCELLED | OUTPATIENT
Start: 2024-06-11

## 2024-06-11 RX ORDER — DIPHENHYDRAMINE HYDROCHLORIDE 50 MG/ML
50 INJECTION INTRAMUSCULAR; INTRAVENOUS ONCE AS NEEDED
Status: DISCONTINUED | OUTPATIENT
Start: 2024-06-11 | End: 2024-06-11 | Stop reason: HOSPADM

## 2024-06-11 RX ORDER — FAMOTIDINE 10 MG/ML
20 INJECTION INTRAVENOUS
Status: COMPLETED | OUTPATIENT
Start: 2024-06-11 | End: 2024-06-11

## 2024-06-11 RX ORDER — DIPHENHYDRAMINE HYDROCHLORIDE 50 MG/ML
50 INJECTION INTRAMUSCULAR; INTRAVENOUS ONCE AS NEEDED
Status: CANCELLED | OUTPATIENT
Start: 2024-06-11

## 2024-06-11 RX ORDER — EPINEPHRINE 0.3 MG/.3ML
0.3 INJECTION SUBCUTANEOUS ONCE AS NEEDED
Status: CANCELLED | OUTPATIENT
Start: 2024-06-11

## 2024-06-11 RX ORDER — HEPARIN 100 UNIT/ML
500 SYRINGE INTRAVENOUS
Status: CANCELLED | OUTPATIENT
Start: 2024-06-11

## 2024-06-11 RX ORDER — EPINEPHRINE 0.3 MG/.3ML
0.3 INJECTION SUBCUTANEOUS ONCE AS NEEDED
Status: DISCONTINUED | OUTPATIENT
Start: 2024-06-11 | End: 2024-06-11 | Stop reason: HOSPADM

## 2024-06-11 RX ORDER — POTASSIUM CHLORIDE 20 MEQ/1
40 TABLET, EXTENDED RELEASE ORAL
Status: COMPLETED | OUTPATIENT
Start: 2024-06-11 | End: 2024-06-11

## 2024-06-11 RX ORDER — HEPARIN 100 UNIT/ML
500 SYRINGE INTRAVENOUS
Status: DISCONTINUED | OUTPATIENT
Start: 2024-06-11 | End: 2024-06-11 | Stop reason: HOSPADM

## 2024-06-11 RX ADMIN — DIPHENHYDRAMINE HYDROCHLORIDE 50 MG: 50 INJECTION, SOLUTION INTRAMUSCULAR; INTRAVENOUS at 10:06

## 2024-06-11 RX ADMIN — SODIUM CHLORIDE: 9 INJECTION, SOLUTION INTRAVENOUS at 10:06

## 2024-06-11 RX ADMIN — PACLITAXEL 96 MG: 6 INJECTION, SOLUTION INTRAVENOUS at 11:06

## 2024-06-11 RX ADMIN — POTASSIUM CHLORIDE 40 MEQ: 1500 TABLET, EXTENDED RELEASE ORAL at 12:06

## 2024-06-11 RX ADMIN — DEXAMETHASONE SODIUM PHOSPHATE 0.25 MG: 4 INJECTION, SOLUTION INTRA-ARTICULAR; INTRALESIONAL; INTRAMUSCULAR; INTRAVENOUS; SOFT TISSUE at 11:06

## 2024-06-11 RX ADMIN — CARBOPLATIN 245 MG: 10 INJECTION, SOLUTION INTRAVENOUS at 12:06

## 2024-06-11 RX ADMIN — FAMOTIDINE 20 MG: 10 INJECTION INTRAVENOUS at 10:06

## 2024-06-11 NOTE — PROGRESS NOTES
Pre-treatment labs reviewed. T bili 1.2 from 1.5, AST and ALT wnl. Potassium 3.1, will replete w/ 40 mEq PO w/ treatment; repeat potassium and mag w/ next treatment. Plt down to 96k, treatment threshold is 75k.     Given improvement in T bili (now <1.5x ULN) and plt >75k, treatment signed.

## 2024-06-11 NOTE — NURSING
PT tolerated Taxol/Carbo infusion well. No adverse reaction noted. Pt education reinforced on side effects, what to expect and when to contact the doctor. Pt verbalized understanding. PIV d/c per protocol, pt tolerated well.

## 2024-06-11 NOTE — PLAN OF CARE
Day 25 of outpatient radiation to the left lung. Report extreme fatigue on Sunday.Fatigue better yesterday and today. Still experiences shortness of breath with distance walking.

## 2024-06-12 ENCOUNTER — PATIENT MESSAGE (OUTPATIENT)
Dept: ADMINISTRATIVE | Facility: OTHER | Age: 79
End: 2024-06-12
Payer: MEDICARE

## 2024-06-12 PROCEDURE — 77417 THER RADIOLOGY PORT IMAGE(S): CPT | Performed by: RADIOLOGY

## 2024-06-12 PROCEDURE — 77386 HC IMRT, COMPLEX: CPT | Performed by: RADIOLOGY

## 2024-06-12 PROCEDURE — G6002 STEREOSCOPIC X-RAY GUIDANCE: HCPCS | Mod: 26,,, | Performed by: RADIOLOGY

## 2024-06-13 DIAGNOSIS — J43.2 CENTRILOBULAR EMPHYSEMA: ICD-10-CM

## 2024-06-13 PROCEDURE — 77386 HC IMRT, COMPLEX: CPT | Performed by: RADIOLOGY

## 2024-06-13 PROCEDURE — 77014 PR  CT GUIDANCE PLACEMENT RAD THERAPY FIELDS: CPT | Mod: 26,,, | Performed by: RADIOLOGY

## 2024-06-13 RX ORDER — PREDNISONE 5 MG/1
TABLET ORAL
Qty: 30 TABLET | Refills: 11 | OUTPATIENT
Start: 2024-06-13

## 2024-06-14 ENCOUNTER — OFFICE VISIT (OUTPATIENT)
Dept: HEMATOLOGY/ONCOLOGY | Facility: CLINIC | Age: 79
End: 2024-06-14
Payer: MEDICARE

## 2024-06-14 VITALS
TEMPERATURE: 98 F | WEIGHT: 218.25 LBS | BODY MASS INDEX: 33.08 KG/M2 | RESPIRATION RATE: 16 BRPM | HEART RATE: 76 BPM | OXYGEN SATURATION: 98 % | SYSTOLIC BLOOD PRESSURE: 112 MMHG | HEIGHT: 68 IN | DIASTOLIC BLOOD PRESSURE: 74 MMHG

## 2024-06-14 DIAGNOSIS — C34.12 ADENOCARCINOMA OF UPPER LOBE OF LEFT LUNG: Primary | ICD-10-CM

## 2024-06-14 PROCEDURE — G2211 COMPLEX E/M VISIT ADD ON: HCPCS | Mod: S$PBB,,, | Performed by: HOSPITALIST

## 2024-06-14 PROCEDURE — 77386 HC IMRT, COMPLEX: CPT | Performed by: RADIOLOGY

## 2024-06-14 PROCEDURE — 99215 OFFICE O/P EST HI 40 MIN: CPT | Mod: PBBFAC,25 | Performed by: HOSPITALIST

## 2024-06-14 PROCEDURE — 99215 OFFICE O/P EST HI 40 MIN: CPT | Mod: S$PBB,,, | Performed by: HOSPITALIST

## 2024-06-14 PROCEDURE — G6002 STEREOSCOPIC X-RAY GUIDANCE: HCPCS | Mod: 26,,, | Performed by: RADIOLOGY

## 2024-06-14 PROCEDURE — 99999 PR PBB SHADOW E&M-EST. PATIENT-LVL V: CPT | Mod: PBBFAC,,, | Performed by: HOSPITALIST

## 2024-06-14 RX ORDER — PREDNISONE 5 MG/1
TABLET ORAL
Qty: 30 TABLET | Refills: 11 | OUTPATIENT
Start: 2024-06-14

## 2024-06-14 NOTE — PROGRESS NOTES
[facility] MEDICAL ONCOLOGY - FOLLOW UP VISIT    Reason for visit: Adenocarcinoma of the SANJAY      Oncology History   Adenocarcinoma, lung, left   3/19/2024 Imaging Significant Findings    CTA PE (hemoptysis)  - 3.4 x 5.8 cm L hilar mass, probable invasion of distal L main bronchus and SANJAY bronchi     3/19/2024 - 3/22/2024 Hospital Admission    Presented with new onset hemoptysis.  Imaging demonstrated left hilar mass.  Bronchoscopy performed and confirmed adenocarcinoma, EGFR Exon 20 insertion.     3/21/2024 Procedure    Bronch w/ EBUS  SANJAY, Endobronchial Bx  - Adenocarcinoma (CK7 and TTF1 positive; CK20, p40, and CK 5/6 negative)    SANJAY, BAL  - Adenocarcinoma    TEMPUS NGS/PD-L1  - PD-L1 TPS: 35%  - EGFR exon 20 inframe insertion ([id number])  - TP53 misssense (V157F)  - Negative: EGFR, ALK, BRAF, KRAS, ROS1, RET, MET, ERBB2  - TMB 9.5 m/MB     4/3/2024 Imaging Significant Findings    MRI Brain  - 0.8 cm rim enhancing lesion in L thalamus, no significant mass effect      4/11/2024 Imaging Significant Findings    PET CT  - 4.8 x 2.3 cm  L hilar mass, SUV 14  - Adjacent pulmonary nodules, e.g. 1.2 cm  - No intrathoracic LAD     4/12/2024 Cancer Staged    Staging form: Lung, AJCC 8th Edition  - Clinical stage from 4/12/2024: Stage IIIA (cT3, cN1, cM0)     4/30/2024 Procedure    Bronch with EBUS  RUL, EBBx  - Adenocarcinoma (TTF-1 positive, p40 negative)    LN  - Adenocarcinoma    Procedure Note  - Station for L was normal-sized not sampled; no other lymph nodes were visible by EBUS, including station 7, 4R, and 11R     5/9/2024 -  Chemotherapy    Treatment Summary   Plan Name: OP NSCLC PACLITAXEL + CARBOPLATIN (AUC) QW + RADIATION  Treatment Goal: Curative  Status: Active  Start Date: 5/9/2024  End Date: 6/17/2024 (Planned)  Provider: Isacc Nuñez IV, MD  Chemotherapy: CARBOplatin (PARAPLATIN) 225 mg in sodium chloride 0.9% 307.5 mL chemo infusion, 225 mg (100 % of original  "dose 227 mg), Intravenous, Clinic/HOD 1 time, 5 of 6 cycles  Dose modification:   (original dose 227 mg, Cycle 1)  Administration: 225 mg (5/9/2024), 245 mg (5/17/2024), 245 mg (5/27/2024), 270 mg (6/3/2024), 245 mg (6/11/2024)  PACLitaxeL (TAXOL) 45 mg/m2 = 96 mg in sodium chloride 0.9% 250 mL chemo infusion, 45 mg/m2 = 96 mg, Intravenous, Clinic/HOD 1 time, 5 of 6 cycles  Administration: 96 mg (5/9/2024), 96 mg (5/17/2024), 96 mg (5/27/2024), 96 mg (6/3/2024), 96 mg (6/11/2024)            HPI:     Jordin Allen Jr. is a 78 y.o. male with pmh significant for restless leg syndrome, COPD, CAD s/p stent (2013), HTN, HLD, obesity, GERD, hearing loss requiring hearing aids, and Stage IIIA (T3N1M0) adenocarcinoma of the L hilum (PD-L1 35%, EGFR exon 20 insertion), initially dx'd 3/21/24, currently on CRT (5/8/24-present) with weekly carboplatin/paclitaxel (5/9/24-present), who presents for follow-up.     Last clinic 6/6/24, proceed with C5 D1 carboplatin/paclitaxel with repeat labs prior given T bili of 1.5.  RTC in 614, labs in 617, C6 D1 on 06/18.  Repeat imaging after completion of CRT    Interval History:  - 6/6/24:  Cardiology, plan to cancel cardiac PET test, frequent PACs on ECG, follow up in 6 months.  - 6/11/24: C5D1 carboplatin/paclitaxel.  Replete with 40 mEq of p.o. potassium.  Platelet 96, threshold 75.     Pt states that he is feeling well today. He denies any symptoms at all. On asking further, he says that he maybe has a little more fatigue "every now and then", noting the last day he was tired was on Sunday. Pt denies N/V, diarrhea, constipation, rash, new/worsening SOB, or new/worsening cough.    Past Medical History:   Past Medical History:   Diagnosis Date    Benign neoplasm of colon 10/01/2013    Bronchitis chronic     CAD (coronary artery disease) 10/31/2013    COPD (chronic obstructive pulmonary disease)     Emphysema of lung     GERD (gastroesophageal reflux disease)     Hx of colonic polyps  "    Hypertension     NAFLD (nonalcoholic fatty liver disease) 2017    SHOLA on CPAP     RLS (restless legs syndrome)     Screen for colon cancer 2013        Past Surgical History:   Past Surgical History:   Procedure Laterality Date    bilateral foot surgery      CARDIAC CATHETERIZATION  2013    x1    CATARACT EXTRACTION, BILATERAL      COLON SURGERY      Ace Mott MD    COLONOSCOPY N/A 3/21/2018    Procedure: COLONOSCOPY;  Surgeon: Terry Mott MD;  Location: [facility] ENDO (4TH FLR);  Service: Endoscopy;  Laterality: N/A;    COLONOSCOPY N/A 2019    Procedure: COLONOSCOPY;  Surgeon: John Mantilla MD;  Location: [facility] ENDO (4TH FLR);  Service: Endoscopy;  Laterality: N/A;    COLONOSCOPY N/A 2022    Procedure: COLONOSCOPY;  Surgeon: MEDINA Farris MD;  [facility]  Service: Endoscopy;  Laterality: N/A;  fully vacc-inst portal-tb    ENDOBRONCHIAL ULTRASOUND Bilateral 2024    Procedure: ENDOBRONCHIAL ULTRASOUND (EBUS);  Surgeon: Naveed Aguero MD;  Location: [facility] OR;  Service: Pulmonary;  Laterality: Bilateral;    scrotal abscess removal          Family History:   Family History   Problem Relation Name Age of Onset    Heart disease Mother      Heart attack Mother      Hypertension Mother      No Known Problems Sister      No Known Problems Daughter 2     Asthma Neg Hx      Emphysema Neg Hx      Prostate cancer Neg Hx      Cirrhosis Neg Hx          Social History:   Social History     Tobacco Use    Smoking status: Former     Current packs/day: 0.00     Average packs/day: 1 pack/day for 40.0 years (40.0 ttl pk-yrs)     Types: Cigarettes     Start date: 8/15/1972     Quit date: 8/15/2012     Years since quittin.8     Passive exposure: Never    Smokeless tobacco: Never   Substance Use Topics    Alcohol use: Yes     Alcohol/week: 3.0 standard drinks of alcohol     Types: 3 Cans of beer per week     Comment: maybe 2-3  beers weekly        I have reviewed and  updated the patient's past medical, surgical, family and social histories.      ROS:   As per HPI.     Allergies:   Review of patient's allergies indicates:   Allergen Reactions    Brilinta [ticagrelor] Itching    Lisinopril      Other reaction(s): cough    Metoprolol succinate Rash        Medications:   Current Outpatient Medications   Medication Sig Dispense Refill    albuterol (ACCUNEB) 0.63 mg/3 mL Nebu Take 3 mLs (0.63 mg total) by nebulization every 6 (six) hours as needed (if symptoms failed to improve with inhaler). Rescue 375 mL 11    albuterol (PROVENTIL/VENTOLIN HFA) 90 mcg/actuation inhaler Inhale 2 puffs into the lungs every 4 (four) hours as needed for Wheezing. 8.5 g 11    amitriptyline (ELAVIL) 25 MG tablet Take 1 tablet (25 mg total) by mouth once daily. 90 tablet 1    amLODIPine (NORVASC) 5 MG tablet Take 1 tablet (5 mg total) by mouth once daily. 90 tablet 3    aspirin (ECOTRIN) 81 MG EC tablet Take 81 mg by mouth once daily.       atorvastatin (LIPITOR) 80 MG tablet TAKE 1 TABLET (80 MG TOTAL) BY MOUTH ONCE DAILY. (Patient taking differently: every evening. TAKE 1 TABLET (80 MG TOTAL) BY MOUTH ONCE DAILY.) 90 tablet 3    benzonatate (TESSALON) 200 MG capsule Take 1 capsule (200 mg total) by mouth 3 (three) times daily as needed for Cough. 90 capsule 3    BETA-CAROTENE,A, W-C & E/MIN (OCUVITE ORAL) Take 1 capsule by mouth once daily.       budesonide-glycopyr-formoterol (BREZTRI AEROSPHERE) 160-9-4.8 mcg/actuation HFAA Inhale 2 puffs into the lungs 2 (two) times a day. 10.7 g 3    echinacea 400 mg Cap Take 1 capsule by mouth once daily.       fluticasone propionate (FLONASE) 50 mcg/actuation nasal spray 2 sprays (100 mcg total) by Each Nostril route once daily. 16 g 11    hydroCHLOROthiazide (HYDRODIURIL) 25 MG tablet Take 1 tablet (25 mg total) by mouth once daily. 90 tablet 3    ketoconazole (NIZORAL) 2 % cream Apply topically once daily. 30 g 1    latanoprost 0.005 % ophthalmic solution Place 1  drop into both eyes once daily.       latanoprost 0.005 % ophthalmic solution INSTILL 1 DROP INTO BOTH EYES AT BEDTIME 7.5 mL 3    losartan (COZAAR) 100 MG tablet Take 1 tablet (100 mg total) by mouth once daily. 90 tablet 2    magic mouthwash diphen/antac/lidoc/nysta Swish and Spit 10 mLs by mouth 4 (four) times daily. 360 mL 3    nitroGLYCERIN (NITROSTAT) 0.4 MG SL tablet Place 1 tablet (0.4 mg total) under the tongue every 5 (five) minutes as needed. 100 tablet 3    OLANZapine (ZYPREXA) 5 MG tablet Take 1 tablet (5 mg total) by mouth every evening. days 1-3 of each chemotherapy cycle. 18 tablet 0    omeprazole (PRILOSEC) 20 MG capsule Take 1 capsule (20 mg total) by mouth once daily. 90 capsule 3    predniSONE (DELTASONE) 5 MG tablet TAKE 1 TABLET BY MOUTH EVERY OTHER DAY. TAKE WITH FOOD 30 tablet 11    prochlorperazine (COMPAZINE) 5 MG tablet Take 1 tablet (5 mg total) by mouth every 6 (six) hours as needed (nausea). 30 tablet 1    roflumilast (DALIRESP) 500 mcg Tab Take 1 tablet (500 mcg total) by mouth once daily. 90 tablet 3    urea (CARMOL) 40 % Crea Apply topically once daily. 85 g 10     No current facility-administered medications for this visit.     Facility-Administered Medications Ordered in Other Visits   Medication Dose Route Frequency Provider Last Rate Last Admin    dextrose 50% injection 12.5 g  12.5 g Intravenous PRN Lidia Deleon MD        dextrose 50% injection 25 g  25 g Intravenous PRN Lidia Deleon MD        insulin regular injection 0-8 Units  0-8 Units Intravenous Continuous PRN Lidia Deleon MD              Physical Exam:       There were no vitals taken for this visit.               Physical Exam  Constitutional:       Appearance: Normal appearance. He is obese.   HENT:      Head: Normocephalic and atraumatic.   Eyes:      Extraocular Movements: Extraocular movements intact.      Conjunctiva/sclera: Conjunctivae normal.      Pupils: Pupils are equal, round, and reactive to  light.   Cardiovascular:      Rate and Rhythm: Normal rate and regular rhythm.      Heart sounds: No murmur heard.     No friction rub. No gallop.   Pulmonary:      Effort: Pulmonary effort is normal.      Breath sounds: No wheezing, rhonchi or rales.   Musculoskeletal:         General: Normal range of motion.      Right lower leg: No edema.      Left lower leg: No edema.   Skin:     General: Skin is warm and dry.      Comments: Bruising over bilateral dorsa of hands   Neurological:      Mental Status: He is alert and oriented to person, place, and time.   Psychiatric:         Mood and Affect: Mood normal.         Thought Content: Thought content normal.         Judgment: Judgment normal.           Labs:   No results found for this or any previous visit (from the past 48 hour(s)).         Imaging:    See oncologic history above.     Path:  See oncologic history above.      Assessment and Plan:     Jordin Allen Jr. is a 78 y.o. male with pmh significant for restless leg syndrome, COPD, CAD s/p stent (2013), HTN, HLD, obesity, GERD, hearing loss requiring hearing aids, and Stage IIIA (T3N1M0) adenocarcinoma of the L hilum (PD-L1 35%, EGFR exon 20 insertion), initially dx'd 3/21/24, currently on CRT (5/8/24-present) with weekly carboplatin/paclitaxel (5/9/24-present), who presents for follow-up.     Stage IIIA Adenocarcinoma of L Hilum, EGFR Exon 20 insertion, PD-L1 35%  ECOG PS 1 (limited by MONTIEL).  Patient is currently on CRT with weekly carboplatin/paclitaxel, tolerating without issue. Most recent imaging is a PET-CT from 4/11/24, prior to treatment start..  Plan to proceed with 6 cycles of weekly carboplatin/paclitaxel in concert with radiation therapy, followed by 1 year immunotherapy maintenance.    PLAN:   -- Proceed with C6D1 carboplatin/paclitaxel on 6/18/24 pending labs on 6/17/24  -- Continue RT as planned  -- Repeat CT C around 5-10 days after last radiation therapy (6/18/24), pt prefers early morning  scan  -- RTC at least 1 day after imaging, discuss transition to durvalumab consolidation at that time      Rim Enhancing Thalamic Lesion  0.8 cm rim enhancing lesion in L thalamus. On review with radiation, low likelihood of malignancy and instead more likely cystic lesion.   -- Message sent to Dr. Deng regarding utility of re-imaging, favoring of low yield      The above information has been reviewed with the patient and all questions have been answered to their apparent satisfaction.  They understand that they can call the clinic with any questions.    Isacc Nuñez MD  Hematology/Oncology  [facility]      Route Chart for Scheduling    Med Onc Chart Routing      Follow up with physician . Labs on 6/17 and chemo on 6/18, as scheduled. CT C/A/P 5-10 days after 6/18/24, preferrably in early morning. RTC with me in person at least 1 day after CT scan.   Follow up with JARAD    Infusion scheduling note    Injection scheduling note    Labs CBC and CMP   Scheduling:  Preferred lab:  Lab interval:     Imaging    Pharmacy appointment    Other referrals                    Disclaimer: This note was prepared using voice recognition system and is likely to have sound alike errors and is not proof read.  Please call me with any questions.

## 2024-06-14 NOTE — Clinical Note
Good morning!  This patient is close to wrapping up CRT. I've ordered post-CRT imaging and will discuss transitioning to durvalumab maintenance with him afterwards.   Just confirming - in your opinion, no role for repeat CNS imaging? He had that rim enhancing thalamic lesion which looks more like a cyst.   Thanks! Jai

## 2024-06-17 ENCOUNTER — LAB VISIT (OUTPATIENT)
Dept: LAB | Facility: HOSPITAL | Age: 79
End: 2024-06-17
Attending: HOSPITALIST
Payer: MEDICARE

## 2024-06-17 DIAGNOSIS — C34.12 ADENOCARCINOMA OF UPPER LOBE OF LEFT LUNG: ICD-10-CM

## 2024-06-17 LAB
ALBUMIN SERPL BCP-MCNC: 3.3 G/DL (ref 3.5–5.2)
ALP SERPL-CCNC: 97 U/L (ref 55–135)
ALT SERPL W/O P-5'-P-CCNC: 19 U/L (ref 10–44)
ANION GAP SERPL CALC-SCNC: 8 MMOL/L (ref 8–16)
AST SERPL-CCNC: 17 U/L (ref 10–40)
BILIRUB SERPL-MCNC: 1.7 MG/DL (ref 0.1–1)
BUN SERPL-MCNC: 23 MG/DL (ref 8–23)
CALCIUM SERPL-MCNC: 9.4 MG/DL (ref 8.7–10.5)
CHLORIDE SERPL-SCNC: 101 MMOL/L (ref 95–110)
CO2 SERPL-SCNC: 31 MMOL/L (ref 23–29)
CREAT SERPL-MCNC: 1 MG/DL (ref 0.5–1.4)
ERYTHROCYTE [DISTWIDTH] IN BLOOD BY AUTOMATED COUNT: 15.2 % (ref 11.5–14.5)
EST. GFR  (NO RACE VARIABLE): >60 ML/MIN/1.73 M^2
GLUCOSE SERPL-MCNC: 116 MG/DL (ref 70–110)
HCT VFR BLD AUTO: 34.1 % (ref 40–54)
HGB BLD-MCNC: 11.4 G/DL (ref 14–18)
IMM GRANULOCYTES # BLD AUTO: 0.02 K/UL (ref 0–0.04)
MCH RBC QN AUTO: 27.3 PG (ref 27–31)
MCHC RBC AUTO-ENTMCNC: 33.4 G/DL (ref 32–36)
MCV RBC AUTO: 82 FL (ref 82–98)
NEUTROPHILS # BLD AUTO: 2.3 K/UL (ref 1.8–7.7)
PLATELET # BLD AUTO: 123 K/UL (ref 150–450)
PMV BLD AUTO: 9.9 FL (ref 9.2–12.9)
POTASSIUM SERPL-SCNC: 3.2 MMOL/L (ref 3.5–5.1)
PROT SERPL-MCNC: 6.3 G/DL (ref 6–8.4)
RBC # BLD AUTO: 4.17 M/UL (ref 4.6–6.2)
SODIUM SERPL-SCNC: 140 MMOL/L (ref 136–145)
WBC # BLD AUTO: 3.21 K/UL (ref 3.9–12.7)

## 2024-06-17 PROCEDURE — 36415 COLL VENOUS BLD VENIPUNCTURE: CPT | Performed by: HOSPITALIST

## 2024-06-17 PROCEDURE — 77386 HC IMRT, COMPLEX: CPT | Performed by: RADIOLOGY

## 2024-06-17 PROCEDURE — 85027 COMPLETE CBC AUTOMATED: CPT | Performed by: HOSPITALIST

## 2024-06-17 PROCEDURE — 80053 COMPREHEN METABOLIC PANEL: CPT | Performed by: HOSPITALIST

## 2024-06-17 PROCEDURE — 77014 PR  CT GUIDANCE PLACEMENT RAD THERAPY FIELDS: CPT | Mod: 26,,, | Performed by: RADIOLOGY

## 2024-06-18 ENCOUNTER — DOCUMENTATION ONLY (OUTPATIENT)
Dept: RADIATION ONCOLOGY | Facility: CLINIC | Age: 79
End: 2024-06-18
Payer: MEDICARE

## 2024-06-18 ENCOUNTER — INFUSION (OUTPATIENT)
Dept: INFUSION THERAPY | Facility: HOSPITAL | Age: 79
End: 2024-06-18
Payer: MEDICARE

## 2024-06-18 VITALS
SYSTOLIC BLOOD PRESSURE: 112 MMHG | OXYGEN SATURATION: 97 % | TEMPERATURE: 98 F | DIASTOLIC BLOOD PRESSURE: 66 MMHG | HEART RATE: 75 BPM | RESPIRATION RATE: 18 BRPM

## 2024-06-18 DIAGNOSIS — C34.92 ADENOCARCINOMA, LUNG, LEFT: Primary | ICD-10-CM

## 2024-06-18 PROCEDURE — 96417 CHEMO IV INFUS EACH ADDL SEQ: CPT

## 2024-06-18 PROCEDURE — 96367 TX/PROPH/DG ADDL SEQ IV INF: CPT

## 2024-06-18 PROCEDURE — 96413 CHEMO IV INFUSION 1 HR: CPT

## 2024-06-18 PROCEDURE — 96375 TX/PRO/DX INJ NEW DRUG ADDON: CPT

## 2024-06-18 PROCEDURE — 63600175 PHARM REV CODE 636 W HCPCS: Performed by: HOSPITALIST

## 2024-06-18 PROCEDURE — 25000003 PHARM REV CODE 250: Performed by: HOSPITALIST

## 2024-06-18 RX ORDER — EPINEPHRINE 0.3 MG/.3ML
0.3 INJECTION SUBCUTANEOUS ONCE AS NEEDED
Status: CANCELLED | OUTPATIENT
Start: 2024-06-18

## 2024-06-18 RX ORDER — FAMOTIDINE 10 MG/ML
20 INJECTION INTRAVENOUS
Status: COMPLETED | OUTPATIENT
Start: 2024-06-18 | End: 2024-06-18

## 2024-06-18 RX ORDER — POTASSIUM CHLORIDE 20 MEQ/1
40 TABLET, EXTENDED RELEASE ORAL
Status: CANCELLED | OUTPATIENT
Start: 2024-06-18

## 2024-06-18 RX ORDER — SODIUM CHLORIDE 0.9 % (FLUSH) 0.9 %
10 SYRINGE (ML) INJECTION
Status: CANCELLED | OUTPATIENT
Start: 2024-06-18

## 2024-06-18 RX ORDER — EPINEPHRINE 0.3 MG/.3ML
0.3 INJECTION SUBCUTANEOUS ONCE AS NEEDED
Status: DISCONTINUED | OUTPATIENT
Start: 2024-06-18 | End: 2024-06-18 | Stop reason: HOSPADM

## 2024-06-18 RX ORDER — PROCHLORPERAZINE EDISYLATE 5 MG/ML
5 INJECTION INTRAMUSCULAR; INTRAVENOUS ONCE AS NEEDED
Status: CANCELLED
Start: 2024-06-18

## 2024-06-18 RX ORDER — SODIUM CHLORIDE 0.9 % (FLUSH) 0.9 %
10 SYRINGE (ML) INJECTION
Status: DISCONTINUED | OUTPATIENT
Start: 2024-06-18 | End: 2024-06-18 | Stop reason: HOSPADM

## 2024-06-18 RX ORDER — POTASSIUM CHLORIDE 20 MEQ/1
40 TABLET, EXTENDED RELEASE ORAL
Status: COMPLETED | OUTPATIENT
Start: 2024-06-18 | End: 2024-06-18

## 2024-06-18 RX ORDER — PROCHLORPERAZINE EDISYLATE 5 MG/ML
5 INJECTION INTRAMUSCULAR; INTRAVENOUS ONCE AS NEEDED
Status: DISCONTINUED | OUTPATIENT
Start: 2024-06-18 | End: 2024-06-18 | Stop reason: HOSPADM

## 2024-06-18 RX ORDER — DIPHENHYDRAMINE HYDROCHLORIDE 50 MG/ML
50 INJECTION INTRAMUSCULAR; INTRAVENOUS ONCE AS NEEDED
Status: CANCELLED | OUTPATIENT
Start: 2024-06-18

## 2024-06-18 RX ORDER — FAMOTIDINE 10 MG/ML
20 INJECTION INTRAVENOUS
Status: CANCELLED | OUTPATIENT
Start: 2024-06-18

## 2024-06-18 RX ORDER — DIPHENHYDRAMINE HYDROCHLORIDE 50 MG/ML
50 INJECTION INTRAMUSCULAR; INTRAVENOUS ONCE AS NEEDED
Status: DISCONTINUED | OUTPATIENT
Start: 2024-06-18 | End: 2024-06-18 | Stop reason: HOSPADM

## 2024-06-18 RX ORDER — HEPARIN 100 UNIT/ML
500 SYRINGE INTRAVENOUS
Status: DISCONTINUED | OUTPATIENT
Start: 2024-06-18 | End: 2024-06-18 | Stop reason: HOSPADM

## 2024-06-18 RX ORDER — HEPARIN 100 UNIT/ML
500 SYRINGE INTRAVENOUS
Status: CANCELLED | OUTPATIENT
Start: 2024-06-18

## 2024-06-18 RX ADMIN — CARBOPLATIN 225 MG: 10 INJECTION, SOLUTION INTRAVENOUS at 11:06

## 2024-06-18 RX ADMIN — SODIUM CHLORIDE: 9 INJECTION, SOLUTION INTRAVENOUS at 09:06

## 2024-06-18 RX ADMIN — FAMOTIDINE 20 MG: 10 INJECTION INTRAVENOUS at 09:06

## 2024-06-18 RX ADMIN — DIPHENHYDRAMINE HYDROCHLORIDE 50 MG: 50 INJECTION, SOLUTION INTRAMUSCULAR; INTRAVENOUS at 09:06

## 2024-06-18 RX ADMIN — DEXAMETHASONE SODIUM PHOSPHATE 0.25 MG: 4 INJECTION, SOLUTION INTRA-ARTICULAR; INTRALESIONAL; INTRAMUSCULAR; INTRAVENOUS; SOFT TISSUE at 09:06

## 2024-06-18 RX ADMIN — PACLITAXEL 78 MG: 6 INJECTION, SOLUTION INTRAVENOUS at 10:06

## 2024-06-18 RX ADMIN — POTASSIUM CHLORIDE 40 MEQ: 1500 TABLET, EXTENDED RELEASE ORAL at 09:06

## 2024-06-18 NOTE — PLAN OF CARE
0818 Labs, hx, and medications reviewed, pt meets parameters for treatment today. Assessment completed and plan of care reviewed. Pt verbalized understanding. PIV accessed with no complications. Pt voices no new complaints or concerns, will continue to monitor for safety.

## 2024-06-18 NOTE — PLAN OF CARE
Outpatient radiation to the left lung completed 6/18/24. Pt to return to clinic for a f/u with Dr Deng in Sept 2024 with a PET scan. Appts scheduled.

## 2024-06-18 NOTE — PLAN OF CARE
1230 Pt tolerated Taxol/Carbo infusion well today, no complaints or complications,. VSS through duration of treatment. Pt aware to call provider with any questions or concerns and is aware of upcoming appts. Pt ambulatory from clinic with steady gait, no distress noted.

## 2024-06-18 NOTE — PROGRESS NOTES
JONATHAN blake 1.7.  paclitaxel. Final dose of chemotherapy (last day radiation today as well). 40 mEq potassium for potassium of 3.2.

## 2024-06-19 ENCOUNTER — PATIENT MESSAGE (OUTPATIENT)
Dept: PULMONOLOGY | Facility: CLINIC | Age: 79
End: 2024-06-19
Payer: MEDICARE

## 2024-06-19 ENCOUNTER — PATIENT MESSAGE (OUTPATIENT)
Dept: ADMINISTRATIVE | Facility: OTHER | Age: 79
End: 2024-06-19
Payer: MEDICARE

## 2024-06-19 RX ORDER — BUDESONIDE, GLYCOPYRROLATE, AND FORMOTEROL FUMARATE 160; 9; 4.8 UG/1; UG/1; UG/1
2 AEROSOL, METERED RESPIRATORY (INHALATION) 2 TIMES DAILY
Qty: 10.7 G | Refills: 3 | Status: SHIPPED | OUTPATIENT
Start: 2024-06-19

## 2024-06-20 ENCOUNTER — PATIENT MESSAGE (OUTPATIENT)
Dept: ADMINISTRATIVE | Facility: OTHER | Age: 79
End: 2024-06-20
Payer: MEDICARE

## 2024-06-22 ENCOUNTER — PATIENT MESSAGE (OUTPATIENT)
Dept: ADMINISTRATIVE | Facility: OTHER | Age: 79
End: 2024-06-22
Payer: MEDICARE

## 2024-06-24 ENCOUNTER — TELEPHONE (OUTPATIENT)
Dept: HEMATOLOGY/ONCOLOGY | Facility: CLINIC | Age: 79
End: 2024-06-24
Payer: MEDICARE

## 2024-06-25 ENCOUNTER — PATIENT MESSAGE (OUTPATIENT)
Dept: ADMINISTRATIVE | Facility: OTHER | Age: 79
End: 2024-06-25
Payer: MEDICARE

## 2024-06-25 DIAGNOSIS — Z00.00 ENCOUNTER FOR MEDICARE ANNUAL WELLNESS EXAM: ICD-10-CM

## 2024-06-26 ENCOUNTER — PATIENT MESSAGE (OUTPATIENT)
Dept: ADMINISTRATIVE | Facility: OTHER | Age: 79
End: 2024-06-26
Payer: MEDICARE

## 2024-06-27 ENCOUNTER — PATIENT MESSAGE (OUTPATIENT)
Dept: ADMINISTRATIVE | Facility: OTHER | Age: 79
End: 2024-06-27
Payer: MEDICARE

## 2024-06-27 ENCOUNTER — HOSPITAL ENCOUNTER (OUTPATIENT)
Dept: RADIOLOGY | Facility: HOSPITAL | Age: 79
Discharge: HOME | End: 2024-06-27
Attending: HOSPITALIST
Payer: MEDICARE

## 2024-06-27 DIAGNOSIS — C34.12 ADENOCARCINOMA OF UPPER LOBE OF LEFT LUNG: ICD-10-CM

## 2024-06-27 PROCEDURE — 71260 CT THORAX DX C+: CPT | Mod: 26,,, | Performed by: RADIOLOGY

## 2024-06-27 PROCEDURE — 71260 CT THORAX DX C+: CPT | Mod: TC

## 2024-06-27 PROCEDURE — 25500020 PHARM REV CODE 255: Performed by: HOSPITALIST

## 2024-06-27 PROCEDURE — 74177 CT ABD & PELVIS W/CONTRAST: CPT | Mod: 26,,, | Performed by: RADIOLOGY

## 2024-06-27 PROCEDURE — 74177 CT ABD & PELVIS W/CONTRAST: CPT | Mod: TC

## 2024-06-27 RX ADMIN — IOHEXOL 100 ML: 350 INJECTION, SOLUTION INTRAVENOUS at 09:06

## 2024-06-28 ENCOUNTER — OFFICE VISIT (OUTPATIENT)
Dept: PULMONOLOGY | Facility: CLINIC | Age: 79
End: 2024-06-28
Payer: MEDICARE

## 2024-06-28 ENCOUNTER — OFFICE VISIT (OUTPATIENT)
Dept: HEMATOLOGY/ONCOLOGY | Facility: CLINIC | Age: 79
End: 2024-06-28
Payer: MEDICARE

## 2024-06-28 ENCOUNTER — LAB VISIT (OUTPATIENT)
Dept: LAB | Facility: HOSPITAL | Age: 79
End: 2024-06-28
Attending: HOSPITALIST
Payer: MEDICARE

## 2024-06-28 VITALS
DIASTOLIC BLOOD PRESSURE: 63 MMHG | HEART RATE: 85 BPM | OXYGEN SATURATION: 98 % | SYSTOLIC BLOOD PRESSURE: 96 MMHG | WEIGHT: 219.56 LBS | BODY MASS INDEX: 33.39 KG/M2

## 2024-06-28 VITALS
BODY MASS INDEX: 33.31 KG/M2 | RESPIRATION RATE: 17 BRPM | DIASTOLIC BLOOD PRESSURE: 69 MMHG | OXYGEN SATURATION: 98 % | HEART RATE: 80 BPM | WEIGHT: 219.81 LBS | TEMPERATURE: 98 F | HEIGHT: 68 IN | SYSTOLIC BLOOD PRESSURE: 120 MMHG

## 2024-06-28 DIAGNOSIS — C79.9 METASTATIC NEOPLASM: ICD-10-CM

## 2024-06-28 DIAGNOSIS — G47.33 OSA (OBSTRUCTIVE SLEEP APNEA): ICD-10-CM

## 2024-06-28 DIAGNOSIS — C34.12 ADENOCARCINOMA OF UPPER LOBE OF LEFT LUNG: Primary | ICD-10-CM

## 2024-06-28 DIAGNOSIS — C34.92 ADENOCARCINOMA, LUNG, LEFT: ICD-10-CM

## 2024-06-28 DIAGNOSIS — J44.9 CHRONIC OBSTRUCTIVE PULMONARY DISEASE, UNSPECIFIED COPD TYPE: Primary | ICD-10-CM

## 2024-06-28 DIAGNOSIS — C34.12 ADENOCARCINOMA OF UPPER LOBE OF LEFT LUNG: ICD-10-CM

## 2024-06-28 LAB
ALBUMIN SERPL BCP-MCNC: 3.1 G/DL (ref 3.5–5.2)
ALP SERPL-CCNC: 103 U/L (ref 55–135)
ALT SERPL W/O P-5'-P-CCNC: 17 U/L (ref 10–44)
ANION GAP SERPL CALC-SCNC: 11 MMOL/L (ref 8–16)
AST SERPL-CCNC: 14 U/L (ref 10–40)
BILIRUB SERPL-MCNC: 1.1 MG/DL (ref 0.1–1)
BUN SERPL-MCNC: 15 MG/DL (ref 8–23)
CALCIUM SERPL-MCNC: 9.1 MG/DL (ref 8.7–10.5)
CHLORIDE SERPL-SCNC: 104 MMOL/L (ref 95–110)
CO2 SERPL-SCNC: 27 MMOL/L (ref 23–29)
CREAT SERPL-MCNC: 0.9 MG/DL (ref 0.5–1.4)
ERYTHROCYTE [DISTWIDTH] IN BLOOD BY AUTOMATED COUNT: 16.7 % (ref 11.5–14.5)
EST. GFR  (NO RACE VARIABLE): >60 ML/MIN/1.73 M^2
GLUCOSE SERPL-MCNC: 140 MG/DL (ref 70–110)
HCT VFR BLD AUTO: 30.3 % (ref 40–54)
HGB BLD-MCNC: 10.1 G/DL (ref 14–18)
IMM GRANULOCYTES # BLD AUTO: 0.02 K/UL (ref 0–0.04)
MCH RBC QN AUTO: 28.1 PG (ref 27–31)
MCHC RBC AUTO-ENTMCNC: 33.3 G/DL (ref 32–36)
MCV RBC AUTO: 84 FL (ref 82–98)
NEUTROPHILS # BLD AUTO: 2.2 K/UL (ref 1.8–7.7)
PLATELET # BLD AUTO: 115 K/UL (ref 150–450)
PMV BLD AUTO: 9.8 FL (ref 9.2–12.9)
POTASSIUM SERPL-SCNC: 3.8 MMOL/L (ref 3.5–5.1)
PROT SERPL-MCNC: 6.2 G/DL (ref 6–8.4)
RBC # BLD AUTO: 3.6 M/UL (ref 4.6–6.2)
SODIUM SERPL-SCNC: 142 MMOL/L (ref 136–145)
WBC # BLD AUTO: 3.34 K/UL (ref 3.9–12.7)

## 2024-06-28 PROCEDURE — 99999 PR PBB SHADOW E&M-EST. PATIENT-LVL V: CPT | Mod: PBBFAC,,, | Performed by: HOSPITALIST

## 2024-06-28 PROCEDURE — G2211 COMPLEX E/M VISIT ADD ON: HCPCS | Mod: S$PBB,,, | Performed by: HOSPITALIST

## 2024-06-28 PROCEDURE — 85027 COMPLETE CBC AUTOMATED: CPT | Performed by: HOSPITALIST

## 2024-06-28 PROCEDURE — 80053 COMPREHEN METABOLIC PANEL: CPT | Performed by: HOSPITALIST

## 2024-06-28 PROCEDURE — 99215 OFFICE O/P EST HI 40 MIN: CPT | Mod: S$PBB,,, | Performed by: HOSPITALIST

## 2024-06-28 PROCEDURE — 99215 OFFICE O/P EST HI 40 MIN: CPT | Mod: PBBFAC,27 | Performed by: HOSPITALIST

## 2024-06-28 PROCEDURE — 99214 OFFICE O/P EST MOD 30 MIN: CPT | Mod: PBBFAC | Performed by: INTERNAL MEDICINE

## 2024-06-28 PROCEDURE — 99999 PR PBB SHADOW E&M-EST. PATIENT-LVL IV: CPT | Mod: PBBFAC,,, | Performed by: INTERNAL MEDICINE

## 2024-06-28 PROCEDURE — 36415 COLL VENOUS BLD VENIPUNCTURE: CPT | Performed by: HOSPITALIST

## 2024-06-28 NOTE — PROGRESS NOTES
[facility] MEDICAL ONCOLOGY - FOLLOW UP VISIT    Reason for visit: Adenocarcinoma of the SANJAY      Oncology History   Adenocarcinoma, lung, left   3/19/2024 Imaging Significant Findings    CTA PE (hemoptysis)  - 3.4 x 5.8 cm L hilar mass, probable invasion of distal L main bronchus and SANJAY bronchi     3/19/2024 - 3/22/2024 Hospital Admission    Presented with new onset hemoptysis.  Imaging demonstrated left hilar mass.  Bronchoscopy performed and confirmed adenocarcinoma, EGFR Exon 20 insertion.     3/21/2024 Procedure    Bronch w/ EBUS  SANAJY, Endobronchial Bx  - Adenocarcinoma (CK7 and TTF1 positive; CK20, p40, and CK 5/6 negative)    SANJAY, BAL  - Adenocarcinoma    TEMPUS NGS/PD-L1  - PD-L1 TPS: 35%  - EGFR exon 20 inframe insertion ([id number])  - TP53 misssense (V157F)  - Negative: EGFR, ALK, BRAF, KRAS, ROS1, RET, MET, ERBB2  - TMB 9.5 m/MB     4/3/2024 Imaging Significant Findings    MRI Brain  - 0.8 cm rim enhancing lesion in L thalamus, no significant mass effect      4/11/2024 Imaging Significant Findings    PET CT  - 4.8 x 2.3 cm  L hilar mass, SUV 14  - Adjacent pulmonary nodules, e.g. 1.2 cm  - No intrathoracic LAD     4/12/2024 Cancer Staged    Staging form: Lung, AJCC 8th Edition  - Clinical stage from 4/12/2024: Stage IIIA (cT3, cN1, cM0)     4/30/2024 Procedure    Bronch with EBUS  RUL, EBBx  - Adenocarcinoma (TTF-1 positive, p40 negative)    LN  - Adenocarcinoma    Procedure Note  - Station for L was normal-sized not sampled; no other lymph nodes were visible by EBUS, including station 7, 4R, and 11R     5/8/2024 - 6/18/2024 Radiation Therapy    Treatment Summary  Course: C1 Lung 2024  Treatment Site Energy Dose/Fx (Gy) #Fx Total Dose (Gy) Start Date End Date Elapsed Days   Left hilum/lung 6X 2 30 / 30 60 5/8/2024 6/18/2024 41        5/9/2024 -  Chemotherapy    Carboplatin/Paclitaxel, Concurrent w/ Radiation Therapy  Weekly  - Carboplatin AUC 2  - Paclitaxel 45  "mg/m2     6/27/2024 Imaging Significant Findings    CT C/A/P  - 3.2 x 3.1 cm left hilar mass, previously 3.7 cm  - Previously seen 1.2 cm nodule adjacent to primary mass not seen on this exam  - Near complete resolution of multiple prior nodular opacities  - Stable RUL micronodule     6/28/2024 -  Chemotherapy    Treatment Summary   Plan Name: OP DURVALUMAB 1500 MG Q4W  Treatment Goal: Curative  Status: Active  Start Date: 6/28/2024 (Planned)  End Date: 5/30/2025 (Planned)  Provider: Isacc Nuñez IV, MD  Chemotherapy: [No matching medication found in this treatment plan]            HPI:     Jordin Allen Jr. is a 78 y.o. male with pmh significant for restless leg syndrome, COPD, CAD s/p stent (2013), HTN, HLD, obesity, GERD, hearing loss requiring hearing aids, and Stage IIIA (T3N1M0) adenocarcinoma of the L hilum (PD-L1 35%, EGFR exon 20 insertion), initially dx'd 3/21/24, completed CRT (5/8/24-6/18/24) , who presents for follow-up.     Last clinic 6/18/24, proceed with C6 D1 carboplatin/paclitaxel. Repeat imaging after completion of CRT showing "Slight interval decrease in size of left hilar mass in patient with history of left upper lobe adenocarcinoma, with involvement of the adjacent vasculature and bronchi as detailed above. There is improvement in multiple previously seen adjacent nodular opacities."     Interval History:  -Patient presents today following completion of CRT on 6/18/24. He reports significant fatigue noted with his last cycle that has lingered with him limiting his time outdoors and trying to nap some during the day. His appetite and nausea are stable with him reported to be having three meals daily. Post CRT scan noted above showing appropriate treatment response with that bringing him relief at this time.       Past Medical History:   Past Medical History:   Diagnosis Date    Benign neoplasm of colon 10/01/2013    Bronchitis chronic     CAD (coronary artery disease) 10/31/2013    " COPD (chronic obstructive pulmonary disease)     Emphysema of lung     GERD (gastroesophageal reflux disease)     Hx of colonic polyps     Hypertension     NAFLD (nonalcoholic fatty liver disease) 2017    SHOLA on CPAP     RLS (restless legs syndrome)     Screen for colon cancer 2013        Past Surgical History:   Past Surgical History:   Procedure Laterality Date    bilateral foot surgery      CARDIAC CATHETERIZATION  2013    x1    CATARACT EXTRACTION, BILATERAL      COLON SURGERY      Ace Mott MD    COLONOSCOPY N/A 3/21/2018    Procedure: COLONOSCOPY;  Surgeon: Terry Mott MD;  Location: [facility] ENDO (4TH FLR);  Service: Endoscopy;  Laterality: N/A;    COLONOSCOPY N/A 2019    Procedure: COLONOSCOPY;  Surgeon: John Mantilla MD;  Location: [facility] ENDO (4TH FLR);  Service: Endoscopy;  Laterality: N/A;    COLONOSCOPY N/A 2022    Procedure: COLONOSCOPY;  Surgeon: MEDINA Farris MD;  Location: [facility] ENDO (4TH FLR);  Service: Endoscopy;  Laterality: N/A;  fully vacc-inst portal-tb    ENDOBRONCHIAL ULTRASOUND Bilateral 2024    Procedure: ENDOBRONCHIAL ULTRASOUND (EBUS);  Surgeon: Naveed Aguero MD;  Location: [facility] OR;  Service: Pulmonary;  Laterality: Bilateral;    scrotal abscess removal          Family History:   Family History   Problem Relation Name Age of Onset    Heart disease Mother      Heart attack Mother      Hypertension Mother      No Known Problems Sister      No Known Problems Daughter 2     Asthma Neg Hx      Emphysema Neg Hx      Prostate cancer Neg Hx      Cirrhosis Neg Hx          Social History:   Social History     Tobacco Use    Smoking status: Former     Current packs/day: 0.00     Average packs/day: 1 pack/day for 40.0 years (40.0 ttl pk-yrs)     Types: Cigarettes     Start date: 8/15/1972     Quit date: 8/15/2012     Years since quittin.8     Passive exposure: Never    Smokeless tobacco: Never   Substance Use Topics     Alcohol use: Yes     Alcohol/week: 3.0 standard drinks of alcohol     Types: 3 Cans of beer per week     Comment: maybe 2-3  beers weekly        I have reviewed and updated the patient's past medical, surgical, family and social histories.      ROS:   As per HPI.     Allergies:   Review of patient's allergies indicates:   Allergen Reactions    Brilinta [ticagrelor] Itching    Lisinopril      Other reaction(s): cough    Metoprolol succinate Rash        Medications:   Current Outpatient Medications   Medication Sig Dispense Refill    albuterol (ACCUNEB) 0.63 mg/3 mL Nebu Take 3 mLs (0.63 mg total) by nebulization every 6 (six) hours as needed (if symptoms failed to improve with inhaler). Rescue 375 mL 11    albuterol (PROVENTIL/VENTOLIN HFA) 90 mcg/actuation inhaler Inhale 2 puffs into the lungs every 4 (four) hours as needed for Wheezing. 8.5 g 11    amitriptyline (ELAVIL) 25 MG tablet Take 1 tablet (25 mg total) by mouth once daily. 90 tablet 1    amLODIPine (NORVASC) 5 MG tablet Take 1 tablet (5 mg total) by mouth once daily. 90 tablet 3    aspirin (ECOTRIN) 81 MG EC tablet Take 81 mg by mouth once daily.       atorvastatin (LIPITOR) 80 MG tablet TAKE 1 TABLET (80 MG TOTAL) BY MOUTH ONCE DAILY. (Patient taking differently: every evening. TAKE 1 TABLET (80 MG TOTAL) BY MOUTH ONCE DAILY.) 90 tablet 3    BETA-CAROTENE,A, W-C & E/MIN (OCUVITE ORAL) Take 1 capsule by mouth once daily.       budesonide-glycopyr-formoterol (BREZTRI AEROSPHERE) 160-9-4.8 mcg/actuation HFAA Inhale 2 puffs into the lungs 2 (two) times a day. 10.7 g 3    echinacea 400 mg Cap Take 1 capsule by mouth once daily.       fluticasone propionate (FLONASE) 50 mcg/actuation nasal spray 2 sprays (100 mcg total) by Each Nostril route once daily. 16 g 11    hydroCHLOROthiazide (HYDRODIURIL) 25 MG tablet Take 1 tablet (25 mg total) by mouth once daily. 90 tablet 3    latanoprost 0.005 % ophthalmic solution Place 1 drop into both eyes once  "daily.       latanoprost 0.005 % ophthalmic solution INSTILL 1 DROP INTO BOTH EYES AT BEDTIME 7.5 mL 3    losartan (COZAAR) 100 MG tablet Take 1 tablet (100 mg total) by mouth once daily. 90 tablet 2    nitroGLYCERIN (NITROSTAT) 0.4 MG SL tablet Place 1 tablet (0.4 mg total) under the tongue every 5 (five) minutes as needed. 100 tablet 3    omeprazole (PRILOSEC) 20 MG capsule Take 1 capsule (20 mg total) by mouth once daily. 90 capsule 3    roflumilast (DALIRESP) 500 mcg Tab Take 1 tablet (500 mcg total) by mouth once daily. 90 tablet 3    urea (CARMOL) 40 % Crea Apply topically once daily. 85 g 10    benzonatate (TESSALON) 200 MG capsule Take 1 capsule (200 mg total) by mouth 3 (three) times daily as needed for Cough. 90 capsule 3    ketoconazole (NIZORAL) 2 % cream Apply topically once daily. (Patient not taking: Reported on 6/28/2024) 30 g 1    magic mouthwash diphen/antac/lidoc/nysta Swish and Spit 10 mLs by mouth 4 (four) times daily. (Patient not taking: Reported on 6/28/2024) 360 mL 3    predniSONE (DELTASONE) 5 MG tablet TAKE 1 TABLET BY MOUTH EVERY OTHER DAY. TAKE WITH FOOD (Patient not taking: Reported on 6/28/2024) 30 tablet 11     No current facility-administered medications for this visit.     Facility-Administered Medications Ordered in Other Visits   Medication Dose Route Frequency Provider Last Rate Last Admin    dextrose 50% injection 12.5 g  12.5 g Intravenous PRN Lidia Deleon MD        dextrose 50% injection 25 g  25 g Intravenous PRN Lidia Deleon MD        insulin regular injection 0-8 Units  0-8 Units Intravenous Continuous PRN Lidia Deleon MD              Physical Exam:       /69 (BP Location: Right arm, Patient Position: Sitting, BP Method: Medium (Automatic))   Pulse 80   Temp 98 °F (36.7 °C) (Oral)   Resp 17   Ht 5' 8" (1.727 m)   Wt 99.7 kg (219 lb 12.8 oz)   SpO2 98%   BMI 33.42 kg/m²                Physical Exam  Constitutional:       " Appearance: Normal appearance. He is obese.   HENT:      Head: Normocephalic and atraumatic.   Eyes:      Extraocular Movements: Extraocular movements intact.      Conjunctiva/sclera: Conjunctivae normal.      Pupils: Pupils are equal, round, and reactive to light.   Cardiovascular:      Rate and Rhythm: Normal rate and regular rhythm.      Heart sounds: No murmur heard.     No friction rub. No gallop.   Pulmonary:      Effort: Pulmonary effort is normal.      Breath sounds: No wheezing, rhonchi or rales.   Musculoskeletal:         General: Normal range of motion.      Right lower leg: No edema.      Left lower leg: No edema.   Skin:     General: Skin is warm and dry.      Comments: Bruising over bilateral dorsa of hands   Neurological:      Mental Status: He is alert and oriented to person, place, and time.   Psychiatric:         Mood and Affect: Mood normal.         Thought Content: Thought content normal.         Judgment: Judgment normal.         Labs:   Recent Results (from the past 48 hour(s))   Comprehensive Metabolic Panel    Collection Time: 06/28/24  8:23 AM   Result Value Ref Range    Sodium 142 136 - 145 mmol/L    Potassium 3.8 3.5 - 5.1 mmol/L    Chloride 104 95 - 110 mmol/L    CO2 27 23 - 29 mmol/L    Glucose 140 (H) 70 - 110 mg/dL    BUN 15 8 - 23 mg/dL    Creatinine 0.9 0.5 - 1.4 mg/dL    Calcium 9.1 8.7 - 10.5 mg/dL    Total Protein 6.2 6.0 - 8.4 g/dL    Albumin 3.1 (L) 3.5 - 5.2 g/dL    Total Bilirubin 1.1 (H) 0.1 - 1.0 mg/dL    Alkaline Phosphatase 103 55 - 135 U/L    AST 14 10 - 40 U/L    ALT 17 10 - 44 U/L    eGFR >60.0 >60 mL/min/1.73 m^2    Anion Gap 11 8 - 16 mmol/L   CBC Oncology    Collection Time: 06/28/24  8:23 AM   Result Value Ref Range    WBC 3.34 (L) 3.90 - 12.70 K/uL    RBC 3.60 (L) 4.60 - 6.20 M/uL    Hemoglobin 10.1 (L) 14.0 - 18.0 g/dL    Hematocrit 30.3 (L) 40.0 - 54.0 %    MCV 84 82 - 98 fL    MCH 28.1 27.0 - 31.0 pg    MCHC 33.3 32.0 - 36.0 g/dL    RDW 16.7 (H) 11.5 - 14.5 %     Platelets 115 (L) 150 - 450 K/uL    MPV 9.8 9.2 - 12.9 fL    Gran # (ANC) 2.2 1.8 - 7.7 K/uL    Immature Grans (Abs) 0.02 0.00 - 0.04 K/uL            Imaging:    See oncologic history above.     Path:  See oncologic history above.      Assessment and Plan:     Jordin Allen Jr. is a 78 y.o. male with pmh significant for restless leg syndrome, COPD, CAD s/p stent (2013), HTN, HLD, obesity, GERD, hearing loss requiring hearing aids, and Stage IIIA (T3N1M0) adenocarcinoma of the L hilum (PD-L1 35%, EGFR exon 20 insertion), initially dx'd 3/21/24, completed CRT on 6/18/24 who presents for follow-up.     Stage IIIA Adenocarcinoma of L Hilum, EGFR Exon 20 insertion, PD-L1 35%  ECOG PS 1 (limited by MONTIEL). Completed CRT on 6/18/24. Presents today to discuss plans for 1 year of adjuvant immunotherapy. Post-treatment CT C/AP showing appropriate treatment response. He reports some post-treatment fatigue following completion of CRT but is still able to perform ADL/IADL's.     PLAN:   -- RTC in 3 weeks with labs for C1D1 of Durvalumab, consented at this clinic visit  -- Educated to notify us if fatigue or other symptoms worsen following completion of CRT  -- Immunotherapy education performed on 6/28/24  -- PET CT scheduled for 9/2024      Rim Enhancing Thalamic Lesion  0.8 cm rim enhancing lesion in L thalamus. On review with radiation, low likelihood of malignancy and instead more likely cystic lesion.   -- Message sent to Dr. Deng regarding utility of re-imaging, favoring of low yield      The above information has been reviewed with the patient and all questions have been answered to their apparent satisfaction.  They understand that they can call the clinic with any questions.    Isacc Nuñez MD  Hematology/Oncology  [facility]      Route Chart for Scheduling    Med Onc Chart Routing      Follow up with physician 3 weeks. 3 weeks with Dr. Nuñez   Follow up with JARAD    Infusion scheduling  note   Please schedule C1 of Durvalumab in 3 weeks following clinic visit.   Injection scheduling note    Labs CBC, CMP, free T4 and TSH   Scheduling:  Preferred lab:  Lab interval:  CBC, CMP, TSH,T4 prior to next visit   Imaging    Pharmacy appointment    Other referrals                  Disclaimer: This note was prepared using voice recognition system and is likely to have sound alike errors and is not proof read.  Please call me with any questions.

## 2024-06-28 NOTE — PROGRESS NOTES
Scan looks good. Proceed w/ durva. Ordered, pending auth.     Some fatigue with last two cycles, okay otherwise. Improved esophagitis.    Has been off of every other day prednisone for the past 10 days. Was on this for the prednisone. Was on this for 10 years.      PLAN:   - Studies   - T bili down to 1.1   - Hgb 10.1, from 11.3   - Plt 115 (previously 93)  - Imaging   - 3 months   - Does have PET per Ish  - Treatment   - Order durvalumab  - RTC    - Labs and RTC w/ C1D1 durvalumab

## 2024-06-28 NOTE — Clinical Note
Okay to do first cycle sooner (fellow put in these orders), looks like treatment is already authorized. Can do labs and RTC same day as C1D1. Thanks!

## 2024-06-28 NOTE — PROGRESS NOTES
Subjective:       Patient ID: Jordin Allen Jr. is a 78 y.o. male.  The patient's last visit with me was on 4/2/2024.     Doing well. Notes unchanged dyspnea with moderate activity. Doing well with Breztri.     Completed XRT and chemo.  Did have fatigue and esophagitis at end of treatment but now back to normal.    Follow-up  Review of Systems    Objective:      Vitals:    06/28/24 1322   BP: 96/63   Pulse: 85     Wt Readings from Last 3 Encounters:   06/28/24 99.6 kg (219 lb 9.3 oz)   06/28/24 99.7 kg (219 lb 12.8 oz)   06/14/24 99 kg (218 lb 4.1 oz)     Temp Readings from Last 3 Encounters:   06/28/24 98 °F (36.7 °C) (Oral)   06/18/24 98.1 °F (36.7 °C)   06/14/24 98.1 °F (36.7 °C) (Oral)     BP Readings from Last 3 Encounters:   06/28/24 96/63   06/28/24 120/69   06/18/24 112/66     Pulse Readings from Last 3 Encounters:   06/28/24 85   06/28/24 80   06/18/24 75       Physical Exam   Constitutional: He appears well-developed and well-nourished.   Pulmonary/Chest:   SANJAY focal wheeze and rhonchi   Vitals reviewed.    CBC  Lab Results   Component Value Date    WBC 3.34 (L) 06/28/2024    HGB 10.1 (L) 06/28/2024    HCT 30.3 (L) 06/28/2024    MCV 84 06/28/2024     (L) 06/28/2024         CMP  Sodium   Date Value Ref Range Status   06/28/2024 142 136 - 145 mmol/L Final     Potassium   Date Value Ref Range Status   06/28/2024 3.8 3.5 - 5.1 mmol/L Final     Chloride   Date Value Ref Range Status   06/28/2024 104 95 - 110 mmol/L Final     CO2   Date Value Ref Range Status   06/28/2024 27 23 - 29 mmol/L Final     Glucose   Date Value Ref Range Status   06/28/2024 140 (H) 70 - 110 mg/dL Final     BUN   Date Value Ref Range Status   06/28/2024 15 8 - 23 mg/dL Final     Creatinine   Date Value Ref Range Status   06/28/2024 0.9 0.5 - 1.4 mg/dL Final     Calcium   Date Value Ref Range Status   06/28/2024 9.1 8.7 - 10.5 mg/dL Final     Total Protein   Date Value Ref Range Status   06/28/2024 6.2 6.0 - 8.4 g/dL Final  "    Albumin   Date Value Ref Range Status   06/28/2024 3.1 (L) 3.5 - 5.2 g/dL Final     Total Bilirubin   Date Value Ref Range Status   06/28/2024 1.1 (H) 0.1 - 1.0 mg/dL Final     Comment:     For infants and newborns, interpretation of results should be based  on gestational age, weight and in agreement with clinical  observations.    Premature Infant recommended reference ranges:  Up to 24 hours.............<8.0 mg/dL  Up to 48 hours............<12.0 mg/dL  3-5 days..................<15.0 mg/dL  6-29 days.................<15.0 mg/dL       Alkaline Phosphatase   Date Value Ref Range Status   06/28/2024 103 55 - 135 U/L Final     AST   Date Value Ref Range Status   06/28/2024 14 10 - 40 U/L Final     ALT   Date Value Ref Range Status   06/28/2024 17 10 - 44 U/L Final     Anion Gap   Date Value Ref Range Status   06/28/2024 11 8 - 16 mmol/L Final     eGFR   Date Value Ref Range Status   06/28/2024 >60.0 >60 mL/min/1.73 m^2 Final       ABG  POC PH   Date Value Ref Range Status   04/02/2013 7.476 (H) 7.35 - 7.45 Final     POC PO2   Date Value Ref Range Status   04/02/2013 76 (L) 80 - 100 mmHg Final     POC PCO2   Date Value Ref Range Status   04/02/2013 32.4 (L) 35 - 45 mmHg Final           Personal Diagnostic Review  I have personally reviewed the following data and added my own interpretation as below:  CT Chest 6/27/24 images personally reviewed and shows slightly reduced size of L hilar mass, no new lesions      6/28/2024     1:22 PM 6/28/2024     8:22 AM 6/18/2024     8:53 AM 6/14/2024     7:46 AM 6/11/2024    10:00 AM 6/6/2024     1:22 PM 6/6/2024     7:54 AM   Pulmonary Function Tests   SpO2 98 % 98 % 97 % 98 % 94 % 99 % 96 %   Height  5' 8" (1.727 m)  5' 8" (1.727 m) 5' 9" (1.753 m) 5' 9" (1.753 m) 5' 8" (1.727 m)   Weight 99.6 kg (219 lb 9.3 oz) 99.7 kg (219 lb 12.8 oz)  99 kg (218 lb 4.1 oz) 99.5 kg (219 lb 5.7 oz) 101.2 kg (223 lb 1.7 oz) 100.7 kg (222 lb 0.1 oz)   BMI (Calculated) 33.4 33.4  33.2 32.4 32.9 " 33.8         Assessment:       1. Chronic obstructive pulmonary disease, unspecified COPD type    2. Adenocarcinoma, lung, left    3. SHOLA (obstructive sleep apnea)        Outpatient Encounter Medications as of 6/28/2024   Medication Sig Dispense Refill    albuterol (ACCUNEB) 0.63 mg/3 mL Nebu Take 3 mLs (0.63 mg total) by nebulization every 6 (six) hours as needed (if symptoms failed to improve with inhaler). Rescue 375 mL 11    albuterol (PROVENTIL/VENTOLIN HFA) 90 mcg/actuation inhaler Inhale 2 puffs into the lungs every 4 (four) hours as needed for Wheezing. 8.5 g 11    amitriptyline (ELAVIL) 25 MG tablet Take 1 tablet (25 mg total) by mouth once daily. 90 tablet 1    amLODIPine (NORVASC) 5 MG tablet Take 1 tablet (5 mg total) by mouth once daily. 90 tablet 3    aspirin (ECOTRIN) 81 MG EC tablet Take 81 mg by mouth once daily.       atorvastatin (LIPITOR) 80 MG tablet TAKE 1 TABLET (80 MG TOTAL) BY MOUTH ONCE DAILY. (Patient taking differently: every evening. TAKE 1 TABLET (80 MG TOTAL) BY MOUTH ONCE DAILY.) 90 tablet 3    BETA-CAROTENE,A, W-C & E/MIN (OCUVITE ORAL) Take 1 capsule by mouth once daily.       budesonide-glycopyr-formoterol (BREZTRI AEROSPHERE) 160-9-4.8 mcg/actuation HFAA Inhale 2 puffs into the lungs 2 (two) times a day. 10.7 g 3    echinacea 400 mg Cap Take 1 capsule by mouth once daily.       fluticasone propionate (FLONASE) 50 mcg/actuation nasal spray 2 sprays (100 mcg total) by Each Nostril route once daily. 16 g 11    hydroCHLOROthiazide (HYDRODIURIL) 25 MG tablet Take 1 tablet (25 mg total) by mouth once daily. 90 tablet 3    latanoprost 0.005 % ophthalmic solution Place 1 drop into both eyes once daily.       latanoprost 0.005 % ophthalmic solution INSTILL 1 DROP INTO BOTH EYES AT BEDTIME 7.5 mL 3    losartan (COZAAR) 100 MG tablet Take 1 tablet (100 mg total) by mouth once daily. 90 tablet 2    nitroGLYCERIN (NITROSTAT) 0.4 MG SL tablet Place 1 tablet (0.4 mg total) under  the tongue every 5 (five) minutes as needed. 100 tablet 3    omeprazole (PRILOSEC) 20 MG capsule Take 1 capsule (20 mg total) by mouth once daily. 90 capsule 3    roflumilast (DALIRESP) 500 mcg Tab Take 1 tablet (500 mcg total) by mouth once daily. 90 tablet 3    urea (CARMOL) 40 % Crea Apply topically once daily. 85 g 10    benzonatate (TESSALON) 200 MG capsule Take 1 capsule (200 mg total) by mouth 3 (three) times daily as needed for Cough. 90 capsule 3    ketoconazole (NIZORAL) 2 % cream Apply topically once daily. (Patient not taking: Reported on 6/28/2024) 30 g 1    magic mouthwash diphen/antac/lidoc/nysta Swish and Spit 10 mLs by mouth 4 (four) times daily. (Patient not taking: Reported on 6/28/2024) 360 mL 3    predniSONE (DELTASONE) 5 MG tablet TAKE 1 TABLET BY MOUTH EVERY OTHER DAY. TAKE WITH FOOD (Patient not taking: Reported on 6/28/2024) 30 tablet 11    [DISCONTINUED] OLANZapine (ZYPREXA) 5 MG tablet Take 1 tablet (5 mg total) by mouth every evening. days 1-3 of each chemotherapy cycle. 18 tablet 0    [DISCONTINUED] prochlorperazine (COMPAZINE) 5 MG tablet Take 1 tablet (5 mg total) by mouth every 6 (six) hours as needed (nausea). 30 tablet 1     Facility-Administered Encounter Medications as of 6/28/2024   Medication Dose Route Frequency Provider Last Rate Last Admin    dextrose 50% injection 12.5 g  12.5 g Intravenous PRN Lidia Deleon MD        dextrose 50% injection 25 g  25 g Intravenous PRN Lidia Deleon MD        insulin regular injection 0-8 Units  0-8 Units Intravenous Continuous PRN Lidia Deleon MD        [COMPLETED] iohexoL (OMNIPAQUE 350) injection 100 mL  100 mL Intravenous ONCE PRN Isacc Nuñez IV, MD   100 mL at 06/27/24 0906     1. Chronic obstructive pulmonary disease, unspecified COPD type    2. Adenocarcinoma, lung, left    3. SHOLA (obstructive sleep apnea)    Plan:     Problem List Items Addressed This Visit          Pulmonary    Chronic obstructive  pulmonary disease - Primary    Overview     72 yo male, former  comes for his periodic pulmonary visit. He saw Dr. Estrada in April and has done well, with no COPD exacerbations. He recently moved to [place] to be closer to his daughter. He uses symbicort and spiriva and his maintenance medications. Peak flow 270 l/min  In April his Fev-1: 1.33 liters 47%. He is an avid golfer and plays 3-4 times a week weather permitting. He has a single cardiac stent.  Proximal LAD was occluded and was opened and a stent placed, 10/31.13  No further symptoms.         Current Assessment & Plan     Significant chronic condition to be followed longitudinally with following long term treatment plan.     Known COPD - recent spirometry with severe COPD. On breztri. Not in exacerbation.  - continue triple therapy   - Continue Rolfumilast  - stopped oral steroids            Oncology    Adenocarcinoma, lung, left    Current Assessment & Plan     Completed definitive treatment with chemo/XRT. Plans for immunotherapy. PET in 3 months.  -discussed interactions between his treatment and COPD.            Other    SHOLA (obstructive sleep apnea)    Current Assessment & Plan     Continue oxygen at night with CPAP. Otherwise, no longer requires O2            Please note Overview Notes are historic documentation. Please review A/P for current updates.  No follow-ups on file.    Future Appointments   Date Time Provider Department Center   8/5/2024 10:30 AM Garrett Lloyd DPM [facility] [place]   9/18/2024  9:00 AM [facility] PET CT LIMIT 500 LBS [facility] PET CT [facility]   9/24/2024  9:00 AM Ashley Deng MD [facility] RAD ONC Al[place] Ward MD

## 2024-06-28 NOTE — ASSESSMENT & PLAN NOTE
Completed definitive treatment with chemo/XRT. Plans for immunotherapy. PET in 3 months.  -discussed interactions between his treatment and COPD.

## 2024-06-28 NOTE — ASSESSMENT & PLAN NOTE
Significant chronic condition to be followed longitudinally with following long term treatment plan.     Known COPD - recent spirometry with severe COPD. On breztri. Not in exacerbation.  - continue triple therapy   - Continue Rolfumilast  - stopped oral steroids

## 2024-06-29 ENCOUNTER — PATIENT MESSAGE (OUTPATIENT)
Dept: ADMINISTRATIVE | Facility: OTHER | Age: 79
End: 2024-06-29
Payer: MEDICARE

## 2024-06-30 ENCOUNTER — PATIENT MESSAGE (OUTPATIENT)
Dept: ADMINISTRATIVE | Facility: OTHER | Age: 79
End: 2024-06-30
Payer: MEDICARE

## 2024-06-30 ENCOUNTER — EXTERNAL CHRONIC CARE MANAGEMENT (OUTPATIENT)
Dept: PRIMARY CARE CLINIC | Facility: CLINIC | Age: 79
End: 2024-06-30
Payer: MEDICARE

## 2024-06-30 PROCEDURE — 99490 CHRNC CARE MGMT STAFF 1ST 20: CPT | Mod: PBBFAC,PO | Performed by: INTERNAL MEDICINE

## 2024-06-30 PROCEDURE — 99490 CHRNC CARE MGMT STAFF 1ST 20: CPT | Mod: S$PBB,,, | Performed by: INTERNAL MEDICINE

## 2024-07-01 ENCOUNTER — PATIENT MESSAGE (OUTPATIENT)
Dept: ADMINISTRATIVE | Facility: OTHER | Age: 79
End: 2024-07-01
Payer: MEDICARE

## 2024-07-01 ENCOUNTER — HOSPITAL ENCOUNTER (OUTPATIENT)
Dept: RADIATION THERAPY | Facility: HOSPITAL | Age: 79
Discharge: HOME | End: 2024-07-01
Attending: RADIOLOGY
Payer: MEDICARE

## 2024-07-02 ENCOUNTER — PATIENT MESSAGE (OUTPATIENT)
Dept: ADMINISTRATIVE | Facility: OTHER | Age: 79
End: 2024-07-02
Payer: MEDICARE

## 2024-07-03 ENCOUNTER — PATIENT MESSAGE (OUTPATIENT)
Dept: ADMINISTRATIVE | Facility: OTHER | Age: 79
End: 2024-07-03
Payer: MEDICARE

## 2024-07-04 ENCOUNTER — PATIENT MESSAGE (OUTPATIENT)
Dept: ADMINISTRATIVE | Facility: OTHER | Age: 79
End: 2024-07-04
Payer: MEDICARE

## 2024-07-05 ENCOUNTER — TELEPHONE (OUTPATIENT)
Dept: HEMATOLOGY/ONCOLOGY | Facility: CLINIC | Age: 79
End: 2024-07-05
Payer: MEDICARE

## 2024-07-05 ENCOUNTER — PATIENT MESSAGE (OUTPATIENT)
Dept: ADMINISTRATIVE | Facility: OTHER | Age: 79
End: 2024-07-05
Payer: MEDICARE

## 2024-07-06 ENCOUNTER — PATIENT MESSAGE (OUTPATIENT)
Dept: ADMINISTRATIVE | Facility: OTHER | Age: 79
End: 2024-07-06
Payer: MEDICARE

## 2024-07-07 ENCOUNTER — PATIENT MESSAGE (OUTPATIENT)
Dept: ADMINISTRATIVE | Facility: OTHER | Age: 79
End: 2024-07-07
Payer: MEDICARE

## 2024-07-08 ENCOUNTER — PATIENT MESSAGE (OUTPATIENT)
Dept: ADMINISTRATIVE | Facility: OTHER | Age: 79
End: 2024-07-08
Payer: MEDICARE

## 2024-07-09 ENCOUNTER — PATIENT MESSAGE (OUTPATIENT)
Dept: ADMINISTRATIVE | Facility: OTHER | Age: 79
End: 2024-07-09
Payer: MEDICARE

## 2024-07-09 ENCOUNTER — TELEPHONE (OUTPATIENT)
Dept: RADIATION ONCOLOGY | Facility: CLINIC | Age: 79
End: 2024-07-09
Payer: MEDICARE

## 2024-07-09 NOTE — TELEPHONE ENCOUNTER
Call to pt to see how he is doing since completing left lung radiation 6/18/24. Call went VM that was full and was unable to leave a message. Pt to f/u in 3 months with a PET. Appt reminders mailed to pt.

## 2024-07-10 ENCOUNTER — PATIENT MESSAGE (OUTPATIENT)
Dept: ADMINISTRATIVE | Facility: OTHER | Age: 79
End: 2024-07-10
Payer: MEDICARE

## 2024-07-11 ENCOUNTER — PATIENT MESSAGE (OUTPATIENT)
Dept: ADMINISTRATIVE | Facility: OTHER | Age: 79
End: 2024-07-11
Payer: MEDICARE

## 2024-07-12 ENCOUNTER — PATIENT MESSAGE (OUTPATIENT)
Dept: ADMINISTRATIVE | Facility: OTHER | Age: 79
End: 2024-07-12
Payer: MEDICARE

## 2024-07-13 ENCOUNTER — PATIENT MESSAGE (OUTPATIENT)
Dept: ADMINISTRATIVE | Facility: OTHER | Age: 79
End: 2024-07-13
Payer: MEDICARE

## 2024-07-14 ENCOUNTER — PATIENT MESSAGE (OUTPATIENT)
Dept: ADMINISTRATIVE | Facility: OTHER | Age: 79
End: 2024-07-14
Payer: MEDICARE

## 2024-07-15 ENCOUNTER — PATIENT MESSAGE (OUTPATIENT)
Dept: ADMINISTRATIVE | Facility: OTHER | Age: 79
End: 2024-07-15
Payer: MEDICARE

## 2024-07-16 ENCOUNTER — PATIENT MESSAGE (OUTPATIENT)
Dept: ADMINISTRATIVE | Facility: OTHER | Age: 79
End: 2024-07-16
Payer: MEDICARE

## 2024-07-17 ENCOUNTER — TELEPHONE (OUTPATIENT)
Dept: HEMATOLOGY/ONCOLOGY | Facility: CLINIC | Age: 79
End: 2024-07-17
Payer: MEDICARE

## 2024-07-17 ENCOUNTER — PATIENT MESSAGE (OUTPATIENT)
Dept: ADMINISTRATIVE | Facility: OTHER | Age: 79
End: 2024-07-17
Payer: MEDICARE

## 2024-07-18 ENCOUNTER — INFUSION (OUTPATIENT)
Dept: INFUSION THERAPY | Facility: HOSPITAL | Age: 79
End: 2024-07-18
Payer: MEDICARE

## 2024-07-18 ENCOUNTER — PATIENT MESSAGE (OUTPATIENT)
Dept: ADMINISTRATIVE | Facility: OTHER | Age: 79
End: 2024-07-18
Payer: MEDICARE

## 2024-07-18 ENCOUNTER — OFFICE VISIT (OUTPATIENT)
Dept: HEMATOLOGY/ONCOLOGY | Facility: CLINIC | Age: 79
End: 2024-07-18
Payer: MEDICARE

## 2024-07-18 VITALS
HEIGHT: 68 IN | SYSTOLIC BLOOD PRESSURE: 116 MMHG | RESPIRATION RATE: 16 BRPM | HEART RATE: 98 BPM | WEIGHT: 210.75 LBS | BODY MASS INDEX: 31.94 KG/M2 | DIASTOLIC BLOOD PRESSURE: 75 MMHG | TEMPERATURE: 98 F | OXYGEN SATURATION: 99 %

## 2024-07-18 VITALS
OXYGEN SATURATION: 99 % | RESPIRATION RATE: 18 BRPM | DIASTOLIC BLOOD PRESSURE: 74 MMHG | HEART RATE: 91 BPM | WEIGHT: 210.75 LBS | BODY MASS INDEX: 31.94 KG/M2 | HEIGHT: 68 IN | TEMPERATURE: 98 F | SYSTOLIC BLOOD PRESSURE: 136 MMHG
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
## 2024-07-18 DIAGNOSIS — R53.83 FATIGUE DUE TO RADIATION THERAPY: ICD-10-CM

## 2024-07-18 DIAGNOSIS — E87.6 HYPOKALEMIA DUE TO INADEQUATE POTASSIUM INTAKE: ICD-10-CM

## 2024-07-18 DIAGNOSIS — C34.92 ADENOCARCINOMA, LUNG, LEFT: Primary | ICD-10-CM

## 2024-07-18 DIAGNOSIS — R06.00 DYSPNEA, UNSPECIFIED TYPE: ICD-10-CM

## 2024-07-18 DIAGNOSIS — R63.0 DECREASED APPETITE: ICD-10-CM

## 2024-07-18 DIAGNOSIS — C34.12 ADENOCARCINOMA OF UPPER LOBE OF LEFT LUNG: Primary | ICD-10-CM

## 2024-07-18 DIAGNOSIS — G93.9 LESION OF BRAIN: ICD-10-CM

## 2024-07-18 DIAGNOSIS — C77.1 SECONDARY MALIGNANT NEOPLASM OF INTRATHORACIC LYMPH NODES: ICD-10-CM

## 2024-07-18 DIAGNOSIS — E86.0 DEHYDRATION: ICD-10-CM

## 2024-07-18 DIAGNOSIS — E80.6 HYPERBILIRUBINEMIA: ICD-10-CM

## 2024-07-18 PROCEDURE — 99215 OFFICE O/P EST HI 40 MIN: CPT | Mod: PBBFAC,25 | Performed by: HOSPITALIST

## 2024-07-18 PROCEDURE — 63600175 PHARM REV CODE 636 W HCPCS: Mod: JZ,JG | Performed by: HOSPITALIST

## 2024-07-18 PROCEDURE — G2211 COMPLEX E/M VISIT ADD ON: HCPCS | Mod: S$PBB,,, | Performed by: HOSPITALIST

## 2024-07-18 PROCEDURE — 25000003 PHARM REV CODE 250: Performed by: HOSPITALIST

## 2024-07-18 PROCEDURE — 96413 CHEMO IV INFUSION 1 HR: CPT

## 2024-07-18 PROCEDURE — 99215 OFFICE O/P EST HI 40 MIN: CPT | Mod: S$PBB,,, | Performed by: HOSPITALIST

## 2024-07-18 PROCEDURE — 99999 PR PBB SHADOW E&M-EST. PATIENT-LVL V: CPT | Mod: PBBFAC,,, | Performed by: HOSPITALIST

## 2024-07-18 RX ORDER — HEPARIN 100 UNIT/ML
500 SYRINGE INTRAVENOUS
Status: CANCELLED | OUTPATIENT
Start: 2024-07-18

## 2024-07-18 RX ORDER — HEPARIN 100 UNIT/ML
500 SYRINGE INTRAVENOUS
Status: DISCONTINUED | OUTPATIENT
Start: 2024-07-18 | End: 2024-07-18 | Stop reason: HOSPADM

## 2024-07-18 RX ORDER — SODIUM CHLORIDE 0.9 % (FLUSH) 0.9 %
10 SYRINGE (ML) INJECTION
Status: DISCONTINUED | OUTPATIENT
Start: 2024-07-18 | End: 2024-07-18 | Stop reason: HOSPADM

## 2024-07-18 RX ORDER — DIPHENHYDRAMINE HYDROCHLORIDE 50 MG/ML
50 INJECTION INTRAMUSCULAR; INTRAVENOUS ONCE AS NEEDED
Status: CANCELLED | OUTPATIENT
Start: 2024-07-18

## 2024-07-18 RX ORDER — EPINEPHRINE 0.3 MG/.3ML
0.3 INJECTION SUBCUTANEOUS ONCE AS NEEDED
Status: DISCONTINUED | OUTPATIENT
Start: 2024-07-18 | End: 2024-07-18 | Stop reason: HOSPADM

## 2024-07-18 RX ORDER — DIPHENHYDRAMINE HYDROCHLORIDE 50 MG/ML
50 INJECTION INTRAMUSCULAR; INTRAVENOUS ONCE AS NEEDED
Status: DISCONTINUED | OUTPATIENT
Start: 2024-07-18 | End: 2024-07-18 | Stop reason: HOSPADM

## 2024-07-18 RX ORDER — POTASSIUM CHLORIDE 20 MEQ/1
40 TABLET, EXTENDED RELEASE ORAL
Status: COMPLETED | OUTPATIENT
Start: 2024-07-18 | End: 2024-07-18

## 2024-07-18 RX ORDER — SODIUM CHLORIDE 0.9 % (FLUSH) 0.9 %
10 SYRINGE (ML) INJECTION
Status: CANCELLED | OUTPATIENT
Start: 2024-07-18

## 2024-07-18 RX ORDER — POTASSIUM CHLORIDE 20 MEQ/1
40 TABLET, EXTENDED RELEASE ORAL
Status: CANCELLED
Start: 2024-07-18

## 2024-07-18 RX ORDER — EPINEPHRINE 0.3 MG/.3ML
0.3 INJECTION SUBCUTANEOUS ONCE AS NEEDED
Status: CANCELLED | OUTPATIENT
Start: 2024-07-18

## 2024-07-18 RX ADMIN — SODIUM CHLORIDE 1000 ML: 9 INJECTION, SOLUTION INTRAVENOUS at 12:07

## 2024-07-18 RX ADMIN — SODIUM CHLORIDE 1500 MG: 9 INJECTION, SOLUTION INTRAVENOUS at 12:07

## 2024-07-18 RX ADMIN — POTASSIUM CHLORIDE 40 MEQ: 1500 TABLET, EXTENDED RELEASE ORAL at 12:07

## 2024-07-18 NOTE — PLAN OF CARE
1241-Labs , hx, and medications reviewed, patient was seen in clinic prior to arrival. Assessment completed. Discussed plan of care with patient. Patient in agreement. Chair reclined and warm blanket and snack offered.

## 2024-07-18 NOTE — PROGRESS NOTES
[facility] MEDICAL ONCOLOGY - FOLLOW UP VISIT    Reason for visit: Adenocarcinoma of the SANJAY      Oncology History   Adenocarcinoma, lung, left   3/19/2024 Imaging Significant Findings    CTA PE (hemoptysis)  - 3.4 x 5.8 cm L hilar mass, probable invasion of distal L main bronchus and SANJAY bronchi     3/19/2024 - 3/22/2024 Hospital Admission    Presented with new onset hemoptysis.  Imaging demonstrated left hilar mass.  Bronchoscopy performed and confirmed adenocarcinoma, EGFR Exon 20 insertion.     3/21/2024 Procedure    Bronch w/ EBUS  SANJAY, Endobronchial Bx  - Adenocarcinoma (CK7 and TTF1 positive; CK20, p40, and CK 5/6 negative)    SANJAY, BAL  - Adenocarcinoma    TEMPUS NGS/PD-L1  - PD-L1 TPS: 35%  - EGFR exon 20 inframe insertion ([id number])  - TP53 misssense ([id number])  - Negative: EGFR, ALK, BRAF, KRAS, ROS1, RET, MET, ERBB2  - TMB 9.5 m/MB     4/3/2024 Imaging Significant Findings    MRI Brain  - 0.8 cm rim enhancing lesion in L thalamus, no significant mass effect      4/11/2024 Imaging Significant Findings    PET CT  - 4.8 x 2.3 cm  L hilar mass, SUV 14  - Adjacent pulmonary nodules, e.g. 1.2 cm  - No intrathoracic LAD     4/12/2024 Cancer Staged    Staging form: Lung, AJCC 8th Edition  - Clinical stage from 4/12/2024: Stage IIIA (cT3, cN1, cM0)     4/30/2024 Procedure    Bronch with EBUS  RUL, EBBx  - Adenocarcinoma (TTF-1 positive, p40 negative)    LN  - Adenocarcinoma    Procedure Note  - Station for L was normal-sized not sampled; no other lymph nodes were visible by EBUS, including station 7, 4R, and 11R     5/8/2024 - 6/18/2024 Radiation Therapy    Treatment Summary  Course: C1 Lung 2024  Treatment Site Energy Dose/Fx (Gy) #Fx Total Dose (Gy) Start Date End Date Elapsed Days   Left hilum/lung 6X 2 30 / 30 60 5/8/2024 6/18/2024 41        5/9/2024 -  Chemotherapy    Carboplatin/Paclitaxel, Concurrent w/ Radiation Therapy  Weekly  - Carboplatin AUC 2  - Paclitaxel 45  "mg/m2     6/27/2024 Imaging Significant Findings    CT C/A/P  - 3.2 x 3.1 cm left hilar mass, previously 3.7 cm  - Previously seen 1.2 cm nodule adjacent to primary mass not seen on this exam  - Near complete resolution of multiple prior nodular opacities  - Stable RUL micronodule     7/18/2024 -  Chemotherapy    Treatment Summary   Plan Name: OP DURVALUMAB 1500 MG Q4W  Treatment Goal: Curative  Status: Active  Start Date: 7/18/2024  End Date: 6/19/2025 (Planned)  Provider: Isacc Nuñez IV, MD  Chemotherapy: [No matching medication found in this treatment plan]              HPI:     Jordin Allen Jr. is a 78 y.o. male with pmh significant for restless leg syndrome, COPD, CAD s/p stent (2013), HTN, HLD, obesity, GERD, hearing loss requiring hearing aids, and Stage IIIA (T3N1M0) adenocarcinoma of the L hilum (PD-L1 35%, EGFR exon 20 insertion), initially dx'd 3/21/24, completed CRT (5/8/24-6/18/24) with weekly carboplatin/paclitaxel (5/9/24-6/18/24) , who presents for follow-up.     Last clinic 6/28/24, patient bleed CRT in imaging with partial response, plan to proceed with durvalumab consolidation.  Discuss repeat CNS imaging at next visit.    Interval History:  Pt states that the "last two radiations knocked the shit out of me". He says that he has been feeling fatigued and is spending more time sitting. He says that sometimes when he is ambulating, he becomes either dizzy or SOB and has to sit down. He says that he is "worn out" when taking laundry out of the machine and taking it to the bed. He says it is a combination of both breathing and fatigue. He came today with oxygen "just in case", which he has not previously used. He hasn't used it but is concerned that he may need it. He denies any cough. He says that his appetite is significantly diminished, and notes that he only had a couple of sips of ensure this morning. He weighs 210 lbs today (7/18/24), from 221 on 6/3/24. He has been taking anti-emetics " "for intermittent nausea, and says these "do okay". He is feeling so poorly that he is moving back in with his daughter.     Past Medical History:   Past Medical History:   Diagnosis Date    Benign neoplasm of colon 10/01/2013    Bronchitis chronic     CAD (coronary artery disease) 10/31/2013    COPD (chronic obstructive pulmonary disease)     Emphysema of lung     GERD (gastroesophageal reflux disease)     Hx of colonic polyps     Hypertension     NAFLD (nonalcoholic fatty liver disease) 03/27/2017    SHOLA on CPAP     RLS (restless legs syndrome)     Screen for colon cancer 08/30/2013        Past Surgical History:   Past Surgical History:   Procedure Laterality Date    bilateral foot surgery  1993    CARDIAC CATHETERIZATION  2013    x1    CATARACT EXTRACTION, BILATERAL      COLON SURGERY  2013    Ace Mott MD    COLONOSCOPY N/A 3/21/2018    Procedure: COLONOSCOPY;  Surgeon: Terry Mott MD;  Location: [facility] ([address] [place] [address]);  Service: Endoscopy;  Laterality: N/A;    COLONOSCOPY N/A 4/12/2019    Procedure: COLONOSCOPY;  Surgeon: John Mantilla MD;  [facility]  Service: Endoscopy;  Laterality: N/A;    COLONOSCOPY N/A 5/31/2022    Procedure: COLONOSCOPY;  Surgeon: MEDINA Farris MD;  [facility]  Service: Endoscopy;  Laterality: N/A;  fully vacc-inst portal-tb    ENDOBRONCHIAL ULTRASOUND Bilateral 4/30/2024    Procedure: ENDOBRONCHIAL ULTRASOUND (EBUS);  Surgeon: Naveed Aguero MD;  Location: [facility] OR;  Service: Pulmonary;  Laterality: Bilateral;    scrotal abscess removal          Family History:   Family History   Problem Relation Name Age of Onset    Heart disease Mother      Heart attack Mother      Hypertension Mother      No Known Problems Sister      No Known Problems Daughter 2     Asthma Neg Hx      Emphysema Neg Hx      Prostate cancer Neg Hx      Cirrhosis Neg Hx          Social History:   Social History     Tobacco Use    Smoking status: Former     Current " packs/day: 0.00     Average packs/day: 1 pack/day for 40.0 years (40.0 ttl pk-yrs)     Types: Cigarettes     Start date: 8/15/1972     Quit date: 8/15/2012     Years since quittin.9     Passive exposure: Never    Smokeless tobacco: Never   Substance Use Topics    Alcohol use: Yes     Alcohol/week: 3.0 standard drinks of alcohol     Types: 3 Cans of beer per week     Comment: maybe 2-3  beers weekly        I have reviewed and updated the patient's past medical, surgical, family and social histories.      ROS:   As per HPI.     Allergies:   Review of patient's allergies indicates:   Allergen Reactions    Brilinta [ticagrelor] Itching    Lisinopril      Other reaction(s): cough    Metoprolol succinate Rash        Medications:   Current Outpatient Medications   Medication Sig Dispense Refill    albuterol (ACCUNEB) 0.63 mg/3 mL Nebu Take 3 mLs (0.63 mg total) by nebulization every 6 (six) hours as needed (if symptoms failed to improve with inhaler). Rescue 375 mL 11    albuterol (PROVENTIL/VENTOLIN HFA) 90 mcg/actuation inhaler Inhale 2 puffs into the lungs every 4 (four) hours as needed for Wheezing. 8.5 g 11    amitriptyline (ELAVIL) 25 MG tablet Take 1 tablet (25 mg total) by mouth once daily. 90 tablet 1    amLODIPine (NORVASC) 5 MG tablet Take 1 tablet (5 mg total) by mouth once daily. 90 tablet 3    aspirin (ECOTRIN) 81 MG EC tablet Take 81 mg by mouth once daily.       atorvastatin (LIPITOR) 80 MG tablet TAKE 1 TABLET (80 MG TOTAL) BY MOUTH ONCE DAILY. (Patient taking differently: every evening. TAKE 1 TABLET (80 MG TOTAL) BY MOUTH ONCE DAILY.) 90 tablet 3    BETA-CAROTENE,A, W-C & E/MIN (OCUVITE ORAL) Take 1 capsule by mouth once daily.       budesonide-glycopyr-formoterol (BREZTRI AEROSPHERE) 160-9-4.8 mcg/actuation HFAA Inhale 2 puffs into the lungs 2 (two) times a day. 10.7 g 3    echinacea 400 mg Cap Take 1 capsule by mouth once daily.       fluticasone propionate (FLONASE) 50 mcg/actuation nasal spray 2  sprays (100 mcg total) by Each Nostril route once daily. 16 g 11    hydroCHLOROthiazide (HYDRODIURIL) 25 MG tablet Take 1 tablet (25 mg total) by mouth once daily. 90 tablet 3    latanoprost 0.005 % ophthalmic solution Place 1 drop into both eyes once daily.       latanoprost 0.005 % ophthalmic solution INSTILL 1 DROP INTO BOTH EYES AT BEDTIME 7.5 mL 3    losartan (COZAAR) 100 MG tablet Take 1 tablet (100 mg total) by mouth once daily. 90 tablet 2    nitroGLYCERIN (NITROSTAT) 0.4 MG SL tablet Place 1 tablet (0.4 mg total) under the tongue every 5 (five) minutes as needed. 100 tablet 3    omeprazole (PRILOSEC) 20 MG capsule Take 1 capsule (20 mg total) by mouth once daily. 90 capsule 3    roflumilast (DALIRESP) 500 mcg Tab Take 1 tablet (500 mcg total) by mouth once daily. 90 tablet 3    benzonatate (TESSALON) 200 MG capsule Take 1 capsule (200 mg total) by mouth 3 (three) times daily as needed for Cough. 90 capsule 3    ketoconazole (NIZORAL) 2 % cream Apply topically once daily. (Patient not taking: Reported on 6/28/2024) 30 g 1    magic mouthwash diphen/antac/lidoc/nysta Swish and Spit 10 mLs by mouth 4 (four) times daily. (Patient not taking: Reported on 6/28/2024) 360 mL 3    predniSONE (DELTASONE) 5 MG tablet TAKE 1 TABLET BY MOUTH EVERY OTHER DAY. TAKE WITH FOOD (Patient not taking: Reported on 6/28/2024) 30 tablet 11    urea (CARMOL) 40 % Crea Apply topically once daily. (Patient not taking: Reported on 7/18/2024) 85 g 10     No current facility-administered medications for this visit.     Facility-Administered Medications Ordered in Other Visits   Medication Dose Route Frequency Provider Last Rate Last Admin    0.9% NaCl 100 mL flush bag   Intravenous 1 time in Clinic/Isacc Almazan IV, MD        alteplase injection 2 mg  2 mg Intra-Catheter PRN Isacc Nuñez IV, MD        dextrose 50% injection 12.5 g  12.5 g Intravenous PRN Lidia Deleon MD        dextrose 50% injection 25 g  25 g Intravenous  "Lidia Gtz MD        diphenhydrAMINE injection 50 mg  50 mg Intravenous Once PRN Isacc Nuñez IV, MD        durvalumab (IMFINZI) 1,500 mg in 0.9% NaCl SolP 280 mL chemo infusion  1,500 mg Intravenous 1 time in Clinic/HOD Isacc Nuñez IV, MD        EPINEPHrine (EPIPEN) 0.3 mg/0.3 mL pen injection 0.3 mg  0.3 mg Intramuscular Once PRN Isacc Nuñez IV, MD        heparin, porcine (PF) 100 unit/mL injection flush 500 Units  500 Units Intravenous PRN Isacc Nuñez IV, MD        hydrocortisone sodium succinate injection 100 mg  100 mg Intravenous Once PRN Isacc Nuñez IV, MD        insulin regular injection 0-8 Units  0-8 Units Intravenous Continuous PRN Lidia Deleon MD        potassium chloride SA CR tablet 40 mEq  40 mEq Oral 1 time in Clinic/HOD Isacc Nuñez IV, MD        sodium chloride 0.9% bolus 1,000 mL 1,000 mL  1,000 mL Intravenous Once Isacc Nuñez IV, MD        sodium chloride 0.9% flush 10 mL  10 mL Intravenous PRN Isacc Nuñez IV, MD              Physical Exam:       /75 (BP Location: Right arm, Patient Position: Sitting, BP Method: Medium (Automatic))   Pulse 98   Temp 97.9 °F (36.6 °C) (Oral)   Resp 16   Ht 5' 8" (1.727 m)   Wt 95.6 kg (210 lb 12.2 oz)   SpO2 99%   BMI 32.05 kg/m²                Physical Exam  Constitutional:       Appearance: Normal appearance. He is obese.      Comments: Sitting in wheelchair, appears more fatigued than on prior visit   HENT:      Head: Normocephalic and atraumatic.   Eyes:      Extraocular Movements: Extraocular movements intact.      Conjunctiva/sclera: Conjunctivae normal.      Pupils: Pupils are equal, round, and reactive to light.   Cardiovascular:      Rate and Rhythm: Normal rate and regular rhythm.      Heart sounds: No murmur heard.     No friction rub. No gallop.   Pulmonary:      Effort: Pulmonary effort is normal.      Breath sounds: No wheezing, rhonchi or rales.   Musculoskeletal:         General: " Normal range of motion.      Right lower leg: No edema.      Left lower leg: No edema.   Skin:     General: Skin is warm and dry.      Comments: Bruising over bilateral dorsa of hands   Neurological:      Mental Status: He is alert and oriented to person, place, and time.   Psychiatric:         Mood and Affect: Mood normal.         Thought Content: Thought content normal.         Judgment: Judgment normal.           Labs:   Recent Results (from the past 48 hour(s))   Comprehensive Metabolic Panel    Collection Time: 07/18/24 10:10 AM   Result Value Ref Range    Sodium 141 136 - 145 mmol/L    Potassium 3.1 (L) 3.5 - 5.1 mmol/L    Chloride 102 95 - 110 mmol/L    CO2 28 23 - 29 mmol/L    Glucose 126 (H) 70 - 110 mg/dL    BUN 22 8 - 23 mg/dL    Creatinine 1.1 0.5 - 1.4 mg/dL    Calcium 10.0 8.7 - 10.5 mg/dL    Total Protein 6.9 6.0 - 8.4 g/dL    Albumin 3.5 3.5 - 5.2 g/dL    Total Bilirubin 1.5 (H) 0.1 - 1.0 mg/dL    Alkaline Phosphatase 114 55 - 135 U/L    AST 19 10 - 40 U/L    ALT 22 10 - 44 U/L    eGFR >60.0 >60 mL/min/1.73 m^2    Anion Gap 11 8 - 16 mmol/L   CBC Oncology    Collection Time: 07/18/24 10:10 AM   Result Value Ref Range    WBC 7.46 3.90 - 12.70 K/uL    RBC 4.00 (L) 4.60 - 6.20 M/uL    Hemoglobin 11.8 (L) 14.0 - 18.0 g/dL    Hematocrit 34.2 (L) 40.0 - 54.0 %    MCV 86 82 - 98 fL    MCH 29.5 27.0 - 31.0 pg    MCHC 34.5 32.0 - 36.0 g/dL    RDW 18.5 (H) 11.5 - 14.5 %    Platelets 179 150 - 450 K/uL    MPV 10.2 9.2 - 12.9 fL    Gran # (ANC) 5.8 1.8 - 7.7 K/uL    Immature Grans (Abs) 0.08 (H) 0.00 - 0.04 K/uL   TSH    Collection Time: 07/18/24 10:10 AM   Result Value Ref Range    TSH 0.479 0.400 - 4.000 uIU/mL   T4, Free    Collection Time: 07/18/24 10:10 AM   Result Value Ref Range    Free T4 1.21 0.71 - 1.51 ng/dL              Imaging:    See oncologic history above.     Path:  See oncologic history above.      Assessment and Plan:     Jordin NELA Allen Jr. is a 78 y.o. male with pmh significant for restless  leg syndrome, COPD, CAD s/p stent (2013), HTN, HLD, obesity, GERD, hearing loss requiring hearing aids, and Stage IIIA (T3N1M0) adenocarcinoma of the L hilum (PD-L1 35%, EGFR exon 20 insertion), initially dx'd 3/21/24, completed CRT on 6/18/24 who presents for follow-up.     Stage IIIA Adenocarcinoma of L Hilum, EGFR Exon 20 insertion, PD-L1 35%  ECOG PS 1 (limited by MONTIEL).  Patient presents today in advance of C1D1 durvalumab consolidation.  Since last visit, he has developed significant fatigue, decreased appetite, and dizziness (see below), favor likely secondary to CRT.  Also endorses shortness of breath in addition to fatigue (see below).  Most recent imaging (CT C/A/P, 6/27/24) demonstrated appropriate treatment effect with decrement in the left hilar mass (3.2 cm from 2.7 cm) as well as resolution of multiple prior nodular opacities.  After long conversation with the patient (see below), will proceed with C1D1 durvalumab today (7/18/24).  Plan for a total 1 year of durvalumab consolidation with imaging every 3 months throughout.    PLAN:   -- Proceed with C1D1 durvalumab today (7/18/24), labs acceptable (hemoglobin up to 11.8, potassium 3.1 as below, T bili 1.5 as below) and treatment signed. 1 L NS w/ treatment due to decreased PO intake.   -- Labs, RTC, and C2D1 on 8/15/24  -- PET CT scheduled for 9/18/24, which is 3 months from prior      Dyspnea  Radiation Fatigue  See HPI for description.  Difficult to disentangle fatigue from dyspnea, though it appears more the former than the latter.  Patient does have oxygen with him though has not used it since discharge from hospital.  Discussed with radiation oncology (Dr. Deng), favor that it is too soon for radiation pneumonitis to develop and instead this is related chiefly to radiation fatigue.  Examination notable for prolonged expiratory phase though otherwise auscultation without significant finding.  Likely low utility for repeat imaging at this time.   Discussed at length with the patient and his daughter, including ED precautions.  Discussed that it is likely safe to proceed with durvalumab, recognizing that immunotherapy-induced pneumonitis may still be a possibility in the future (typically not occurring for multiple weeks after first dose at a minimum).  After conversation, patient confirmed that he would like to proceed with durvalumab today.  -- ED precautions given  -- Pt has O2 to use PRN  -- Continue to monitor closely      Hypokalemia  Potassium 3.1 today (7/18/24).  Favor this is due to decreased appetite/PO intake.  -- Potassium chloride 40 mEq today with treatment      Decreased Appetite  Likely secondary to recent CRT.  Patient has lost approximately 10 lb in the past month.  Discussed multiple small meals throughout the day and focusing on caloric dense food.  Also discussed importance of drinking fluids throughout the day.  -- Onc nutrition referral in place      Dehydration  Patient would significantly decreased p.o. intake as above.  Skin with more fine lines in previously.  Patient describes some dizziness with standing/walking which is new.  BP stable today at 116/75.  Discussed importance of drinking fluids throughout the day.  -- 1 L NS today with treatment      Hyperbilirubinemia  T bili 1.5 today (7/18/24).  This has been intermittently elevated to approximately the same range over the past multiple lab visits.  Imaging without evidence of metastatic disease to the liver.  Given intermittent elevation in past, may represent Gilbert's. As the patient has not received durvalumab at this time, unrelated to immunotherapy and so safe to proceed.  Will continue to monitor closely.  -- Will request direct bili on labs from today      Rim Enhancing Thalamic Lesion  0.8 cm rim enhancing lesion in L thalamus. On review with radiation, low likelihood of malignancy and instead more likely cystic lesion.   -- Repeat MRI brain now for surveillance, given  last on 4/3/24      The above information has been reviewed with the patient and all questions have been answered to their apparent satisfaction.  They understand that they can call the clinic with any questions.    Isacc Nuñez MD  Hematology/Oncology  [facility]      Route Chart for Scheduling    Med Onc Chart Routing      Follow up with physician . MRI brain now. Labs, RTC, and C2D1 on 8/15/24   Follow up with JARAD    Infusion scheduling note    Injection scheduling note    Labs CBC, CMP, free T4 and TSH   Scheduling:  Preferred lab:  Lab interval:     Imaging    Pharmacy appointment    Other referrals         Onc nutrition                 Disclaimer: This note was prepared using voice recognition system and is likely to have sound alike errors and is not proof read.  Please call me with any questions.

## 2024-07-18 NOTE — PLAN OF CARE
1400-Patient tolerated treatment well. Discharged without complaints or S/S of adverse event. AVS given.  Instructed to call provider for any questions or concerns.      no fever and no chills.

## 2024-07-19 ENCOUNTER — TELEPHONE (OUTPATIENT)
Dept: HEMATOLOGY/ONCOLOGY | Facility: CLINIC | Age: 79
End: 2024-07-19
Payer: MEDICARE

## 2024-07-19 NOTE — PROGRESS NOTES
Established Patient - Audio Only Telehealth Visit     The patient location is: [place] / [facility]  The chief complaint leading to consultation is:  Unintentional weight loss, Decreased appetite, Nausea/vomiting   Visit type: Virtual visit with audio only (telephone)  Total time spent with patient: 25       The reason for the audio only service rather than synchronous audio and video virtual visit was related to technical difficulties or patient preference/necessity.     Each patient to whom I provide medical services by telemedicine is:  (1) informed of the relationship between the physician and patient and the respective role of any other health care provider with respect to management of the patient; and (2) notified that they may decline to receive medical services by telemedicine and may withdraw from such care at any time. Patient verbally consented to receive this service via voice-only telephone call.    Oncology Nutrition Assessment Medical Nutrition Therapy Initial Visit    Jordin Allen Jr.  1945    Referring Provider: Isacc Nuñez IV, MD   Reason for Referral: Unintentional weight loss, Decreased appetite, Nausea/vomiting     Diagnosis: Adenocarcinoma of the SANJAY   Treatment: OP DURVALUMAB 1500 MG Q4W 7/18/2024 to 6/19/2025 (curative) - Dr. Nuñez    PMHx:   Past Medical History:   Diagnosis Date    Benign neoplasm of colon 10/01/2013    Bronchitis chronic     CAD (coronary artery disease) 10/31/2013    COPD (chronic obstructive pulmonary disease)     Emphysema of lung     GERD (gastroesophageal reflux disease)     Hx of colonic polyps     Hypertension     NAFLD (nonalcoholic fatty liver disease) 03/27/2017    SHOLA on CPAP     RLS (restless legs syndrome)     Screen for colon cancer 08/30/2013      Allergies: Brilinta [ticagrelor], Lisinopril, and Metoprolol succinate    Nutrition Assessment    Anthropometrics:   Weight:   Wt Readings from Last 10 Encounters:   07/23/24 95.3 kg (210 lb)  "  07/18/24 95.6 kg (210 lb 12.2 oz)   07/18/24 95.6 kg (210 lb 12.2 oz)   06/28/24 99.6 kg (219 lb 9.3 oz)   06/28/24 99.7 kg (219 lb 12.8 oz)   06/14/24 99 kg (218 lb 4.1 oz)   06/11/24 99.5 kg (219 lb 5.7 oz)   06/06/24 101.2 kg (223 lb 1.7 oz)   06/06/24 100.7 kg (222 lb 0.1 oz)   06/03/24 100.5 kg (221 lb 9 oz)                              Weight Change: 11 lbs in 1.5 months from June to July 2024 Ht Readings from Last 1 Encounters:   07/23/24 5' 8" (1.727 m)      BMI Readings from Last 1 Encounters:   07/23/24 31.93 kg/m²     Estimated body surface area is 2.14 meters squared as calculated from the following:    Height as of an earlier encounter on 7/23/24: 5' 8" (1.727 m).    Weight as of an earlier encounter on 7/23/24: 95.3 kg (210 lb).        Appetite/Intake: Trying to eating 1-2 times daily. Only able to get in a few bites at a time. Enjoys fruit (peaches, cantaloupe), chocolate ice cream, and Ensure 1 bottle daily. Dislikes meat at this time. Drinking 32-48 oz of water, coke, and Pedialyte.     Encounter Notes:  Jordin NELA Allen Jr. is a 78 y.o. male with Stage IIIA (T3N1M0) adenocarcinoma of the L hilum (PD-L1 35%, EGFR exon 20 insertion), referred by Isacc Nuñez IV, MD for nutrition assessment and counseling initially dx'd 3/21/24, completed CRT (5/8/24-6/18/24) with weekly carboplatin/paclitaxel (5/9/24-6/18/24). PMH significant for restless leg syndrome, COPD, CAD s/p stent (2013), HTN, HLD, obesity, GERD, and hearing loss requiring hearing aids.  Weight loss of 15 lbs in 2 months from May to July 2024 noted.  Likely related to very poor appetite . Noted that Mr. Allen presented to ED this morning and took calling while waiting to speak with ED doctor. He reports going to the ED for symptomatic dehydration: dizziness and BP 90/68 . Of note, patient reports 1 episode of vomiting this morning and several episodes of diarrhea a few days ago which required use of Depends. He reports that " diarrhea has now resolved. Current nutrition impact symptoms listed below.  Patient mentioned several times that he would like to explore the option of HH providing IV fluids in his home. Explained that I would reach out to Dr. Nuñez and Madalyn DECKER to determine if this is something Dr. Nuñez feels is needed for the patient and if Madalyn knows of HH ability to perform this task.     Nutrition Impact Symptoms    [] No nutritional concerns at current  [x] Poor Appetite   [x] Weight loss   [x] Nausea   [x] Vomiting - only this morning  [] Diarrhea - lasted a few days  [] Constipation   [x] Early Satiety [] Change in smell   [x] Change in taste   [x] Dry Mouth   [] Thick saliva   [] Dysphagia   [] Difficulty chewing  [] Mucositis  [] Indigestion  [] Gas/Bloating  [] Reflux      [] Fatigue     [] other, please specify- n/a     Current Medications:  Current Outpatient Medications   Medication Instructions    albuterol (ACCUNEB) 0.63 mg, Nebulization, Every 6 hours PRN, Rescue    albuterol (PROVENTIL/VENTOLIN HFA) 90 mcg/actuation inhaler 2 puffs, Inhalation, Every 4 hours PRN    amitriptyline (ELAVIL) 25 mg, Oral, Daily    amLODIPine (NORVASC) 5 mg, Oral, Daily    aspirin (ECOTRIN) 81 mg, Oral, Daily    atorvastatin (LIPITOR) 80 MG tablet TAKE 1 TABLET (80 MG TOTAL) BY MOUTH ONCE DAILY.    benzonatate (TESSALON) 200 mg, Oral, 3 times daily PRN    BETA-CAROTENE,A, W-C & E/MIN (OCUVITE ORAL) 1 capsule, Oral, Daily    budesonide-glycopyr-formoterol (BREZTRI AEROSPHERE) 160-9-4.8 mcg/actuation HFAA 2 puffs, Inhalation, 2 times daily    echinacea 400 mg Cap 1 capsule, Oral, Daily    fluticasone propionate (FLONASE) 100 mcg, Each Nostril, Daily    hydroCHLOROthiazide (HYDRODIURIL) 25 mg, Oral, Daily    ketoconazole (NIZORAL) 2 % cream Topical (Top), Daily    latanoprost 0.005 % ophthalmic solution 1 drop, Both Eyes, Daily    latanoprost 0.005 % ophthalmic solution INSTILL 1 DROP INTO BOTH EYES AT BEDTIME    losartan  (COZAAR) 100 mg, Oral, Daily    magic mouthwash diphen/antac/lidoc/nysta Swish and Spit 10 mLs by mouth 4 (four) times daily.    nitroGLYCERIN (NITROSTAT) 0.4 mg, Sublingual, Every 5 min PRN    omeprazole (PRILOSEC) 20 mg, Oral, Daily    predniSONE (DELTASONE) 5 MG tablet TAKE 1 TABLET BY MOUTH EVERY OTHER DAY. TAKE WITH FOOD    roflumilast (DALIRESP) 500 mcg, Oral, Daily    urea (CARMOL) 40 % Crea Topical (Top), Daily   Stopped Prednisone 2 weeks ago, No longer taking Magic Mouthwash.  Taking Echinacea, started 10 years ago.    Labs: Reviewed 07/18/24: gluc 126, k 3.1, bili total 1.5; no new labs as of 07/23/24 at 10:52 am    Nutrition Diagnosis    Nutrition Problem: Inadequate fluid intake  Etiology (related to): nausea, vomiting, diarrhea, and decreased intake  Signs/Symptoms (as evidenced by):  symptomatic dehydration BP 90/68  and dizziness     Nutrition Intervention    Nutrition Prescription  2880 kcals/day  30 kcal/kg   96 g protein/day  1 gm/kg  3.2 L fluid/day BSA x 1500    Recommendations:   Encouraged small frequent meals (minimum of 6 bites per meal)  Aim for 5-6 mini meals daily or eating every 2 hours while wake.   May also try eating 1-2 bites during each commercial break if watching TV or each time you turn a page if reading a book.  Discussed ways to increase hydration from foods eaten. Ice cream, jello, popsicles, fruits, etc  Recommend appetite stimulant such as Marinol 2.5 mg TID for nausea management and increase appetite  Staff message sent to Dr. Ponce and Madalyn Sanchez regarding patient request for IV fluids by   May consider Ensure Clear or Boost Breeze as supplement alternative.    Materials Provided/Reviewed:  Water Content of Foods handout    Nutrition Monitoring and Evaluation    Monitor: weight, diet education needs , symptom management , and adherence to nutrition recommendations     Follow up Patient will follow up via portal as needed with any questions/concerns      Communication to referring provider/care team: Note available in chart.     Consultation Time:  25  Minutes    Dave RIZO, , LDN  Advanced Practice in Clinical Nutrition  Board Certified Specialist in Oncology Nutrition   [facility]  [phone]                                                        This service was not originating from a related E/M service provided within the previous 7 days nor will  to an E/M service or procedure within the next 24 hours or my soonest available appointment.  Prevailing standard of care was able to be met in this audio-only visit.

## 2024-07-19 NOTE — TELEPHONE ENCOUNTER
Spoke w/ pt in regards to scheduling nutrition appt per referral from Dr. Nuñez. Pt stated have transportation issue and may not be able to drive himself. MA offer scheduling virtual visit and give guidance on how to join. Pt decline. Pt approved for audio only on Tuesday 7/23 @ 10AM with Dave Lux RD.       [place], [place] ext [phone]

## 2024-07-22 ENCOUNTER — PATIENT MESSAGE (OUTPATIENT)
Dept: ADMINISTRATIVE | Facility: OTHER | Age: 79
End: 2024-07-22
Payer: MEDICARE

## 2024-07-23 ENCOUNTER — CLINICAL SUPPORT (OUTPATIENT)
Dept: HEMATOLOGY/ONCOLOGY | Facility: CLINIC | Age: 79
End: 2024-07-23
Attending: RADIOLOGY
Payer: MEDICARE

## 2024-07-23 ENCOUNTER — PATIENT MESSAGE (OUTPATIENT)
Dept: ADMINISTRATIVE | Facility: OTHER | Age: 79
End: 2024-07-23
Payer: MEDICARE

## 2024-07-23 ENCOUNTER — PATIENT MESSAGE (OUTPATIENT)
Dept: HEMATOLOGY/ONCOLOGY | Facility: CLINIC | Age: 79
End: 2024-07-23

## 2024-07-23 ENCOUNTER — HOSPITAL ENCOUNTER (EMERGENCY)
Facility: HOSPITAL | Age: 79
Discharge: HOME | End: 2024-07-23
Attending: EMERGENCY MEDICINE
Payer: MEDICARE

## 2024-07-23 VITALS
DIASTOLIC BLOOD PRESSURE: 81 MMHG | SYSTOLIC BLOOD PRESSURE: 133 MMHG | RESPIRATION RATE: 19 BRPM | HEIGHT: 68 IN | WEIGHT: 210 LBS | BODY MASS INDEX: 31.83 KG/M2 | TEMPERATURE: 98 F | HEART RATE: 86 BPM | OXYGEN SATURATION: 97 %

## 2024-07-23 DIAGNOSIS — E86.0 DEHYDRATION: ICD-10-CM

## 2024-07-23 DIAGNOSIS — C34.12 ADENOCARCINOMA OF UPPER LOBE OF LEFT LUNG: ICD-10-CM

## 2024-07-23 DIAGNOSIS — Z71.3 NUTRITIONAL COUNSELING: Primary | ICD-10-CM

## 2024-07-23 DIAGNOSIS — R42 DIZZINESS: ICD-10-CM

## 2024-07-23 DIAGNOSIS — C34.92 ADENOCARCINOMA, LUNG, LEFT: ICD-10-CM

## 2024-07-23 DIAGNOSIS — R63.0 DECREASED APPETITE: ICD-10-CM

## 2024-07-23 DIAGNOSIS — I95.1 ORTHOSTATIC HYPOTENSION: Primary | ICD-10-CM

## 2024-07-23 LAB
ALBUMIN SERPL BCP-MCNC: 3.3 G/DL (ref 3.5–5.2)
ALLENS TEST: ABNORMAL
ALLENS TEST: NORMAL
ALP SERPL-CCNC: 100 U/L (ref 55–135)
ALT SERPL W/O P-5'-P-CCNC: 30 U/L (ref 10–44)
ANION GAP SERPL CALC-SCNC: 16 MMOL/L (ref 8–16)
AST SERPL-CCNC: 32 U/L (ref 10–40)
BASOPHILS # BLD AUTO: 0.03 K/UL (ref 0–0.2)
BASOPHILS NFR BLD: 0.4 % (ref 0–1.9)
BILIRUB SERPL-MCNC: 1.4 MG/DL (ref 0.1–1)
BILIRUB UR QL STRIP: NEGATIVE
BNP SERPL-MCNC: 21 PG/ML (ref 0–99)
BUN SERPL-MCNC: 22 MG/DL (ref 6–30)
BUN SERPL-MCNC: 22 MG/DL (ref 8–23)
BUN SERPL-MCNC: 23 MG/DL (ref 6–30)
CALCIUM SERPL-MCNC: 9 MG/DL (ref 8.7–10.5)
CHLORIDE SERPL-SCNC: 100 MMOL/L (ref 95–110)
CHLORIDE SERPL-SCNC: 102 MMOL/L (ref 95–110)
CHLORIDE SERPL-SCNC: 98 MMOL/L (ref 95–110)
CLARITY UR REFRACT.AUTO: CLEAR
CO2 SERPL-SCNC: 21 MMOL/L (ref 23–29)
COLOR UR AUTO: YELLOW
CREAT SERPL-MCNC: 1.2 MG/DL (ref 0.5–1.4)
DIFFERENTIAL METHOD BLD: ABNORMAL
EOSINOPHIL # BLD AUTO: 0.1 K/UL (ref 0–0.5)
EOSINOPHIL NFR BLD: 1.2 % (ref 0–8)
ERYTHROCYTE [DISTWIDTH] IN BLOOD BY AUTOMATED COUNT: 17.6 % (ref 11.5–14.5)
EST. GFR  (NO RACE VARIABLE): >60 ML/MIN/1.73 M^2
GLUCOSE SERPL-MCNC: 106 MG/DL (ref 70–110)
GLUCOSE SERPL-MCNC: 107 MG/DL (ref 70–110)
GLUCOSE SERPL-MCNC: 108 MG/DL (ref 70–110)
GLUCOSE UR QL STRIP: NEGATIVE
HCT VFR BLD AUTO: 37.2 % (ref 40–54)
HCT VFR BLD CALC: 31 %PCV (ref 36–54)
HCT VFR BLD CALC: 33 %PCV (ref 36–54)
HGB BLD-MCNC: 11.9 G/DL (ref 14–18)
HGB UR QL STRIP: NEGATIVE
IMM GRANULOCYTES # BLD AUTO: 0.03 K/UL (ref 0–0.04)
IMM GRANULOCYTES NFR BLD AUTO: 0.4 % (ref 0–0.5)
KETONES UR QL STRIP: NEGATIVE
LDH SERPL L TO P-CCNC: 1.88 MMOL/L (ref 0.5–2.2)
LDH SERPL L TO P-CCNC: 3.58 MMOL/L (ref 0.5–2.2)
LEUKOCYTE ESTERASE UR QL STRIP: NEGATIVE
LYMPHOCYTES # BLD AUTO: 0.5 K/UL (ref 1–4.8)
LYMPHOCYTES NFR BLD: 6.2 % (ref 18–48)
MAGNESIUM SERPL-MCNC: 2 MG/DL (ref 1.6–2.6)
MCH RBC QN AUTO: 28.1 PG (ref 27–31)
MCHC RBC AUTO-ENTMCNC: 32 G/DL (ref 32–36)
MCV RBC AUTO: 88 FL (ref 82–98)
MONOCYTES # BLD AUTO: 1 K/UL (ref 0.3–1)
MONOCYTES NFR BLD: 12.3 % (ref 4–15)
NEUTROPHILS # BLD AUTO: 6.5 K/UL (ref 1.8–7.7)
NEUTROPHILS NFR BLD: 79.5 % (ref 38–73)
NITRITE UR QL STRIP: NEGATIVE
NRBC BLD-RTO: 0 /100 WBC
OHS QRS DURATION: 84 MS
OHS QTC CALCULATION: 491 MS
PH UR STRIP: 7 [PH] (ref 5–8)
PLATELET # BLD AUTO: 183 K/UL (ref 150–450)
PMV BLD AUTO: 11.2 FL (ref 9.2–12.9)
POC IONIZED CALCIUM: 1.05 MMOL/L (ref 1.06–1.42)
POC IONIZED CALCIUM: 1.14 MMOL/L (ref 1.06–1.42)
POC TCO2 (MEASURED): 28 MMOL/L (ref 23–27)
POC TCO2 (MEASURED): 28 MMOL/L (ref 23–29)
POTASSIUM BLD-SCNC: 2.7 MMOL/L (ref 3.5–5.1)
POTASSIUM BLD-SCNC: 2.8 MMOL/L (ref 3.5–5.1)
POTASSIUM SERPL-SCNC: 3.6 MMOL/L (ref 3.5–5.1)
PROT SERPL-MCNC: 7.4 G/DL (ref 6–8.4)
PROT UR QL STRIP: NEGATIVE
PROVIDER CREDENTIALS: ABNORMAL
PROVIDER NOTIFIED: ABNORMAL
RBC # BLD AUTO: 4.23 M/UL (ref 4.6–6.2)
SAMPLE: ABNORMAL
SAMPLE: NORMAL
SARS-COV-2 RDRP RESP QL NAA+PROBE: NEGATIVE
SITE: ABNORMAL
SITE: NORMAL
SODIUM BLD-SCNC: 139 MMOL/L (ref 136–145)
SODIUM BLD-SCNC: 141 MMOL/L (ref 136–145)
SODIUM SERPL-SCNC: 139 MMOL/L (ref 136–145)
SP GR UR STRIP: 1.01 (ref 1–1.03)
TIME NOTIFIED: 1217
TROPONIN I SERPL DL<=0.01 NG/ML-MCNC: <0.006 NG/ML (ref 0–0.03)
URN SPEC COLLECT METH UR: NORMAL
VERBAL RESULT READBACK PERFORMED: YES
WBC # BLD AUTO: 8.21 K/UL (ref 3.9–12.7)

## 2024-07-23 PROCEDURE — 93005 ELECTROCARDIOGRAM TRACING: CPT

## 2024-07-23 PROCEDURE — 63600175 PHARM REV CODE 636 W HCPCS: Performed by: EMERGENCY MEDICINE

## 2024-07-23 PROCEDURE — 87040 BLOOD CULTURE FOR BACTERIA: CPT | Performed by: EMERGENCY MEDICINE

## 2024-07-23 PROCEDURE — 63600175 PHARM REV CODE 636 W HCPCS: Performed by: STUDENT IN AN ORGANIZED HEALTH CARE EDUCATION/TRAINING PROGRAM

## 2024-07-23 PROCEDURE — 83605 ASSAY OF LACTIC ACID: CPT

## 2024-07-23 PROCEDURE — 96365 THER/PROPH/DIAG IV INF INIT: CPT

## 2024-07-23 PROCEDURE — 99900035 HC TECH TIME PER 15 MIN (STAT)

## 2024-07-23 PROCEDURE — 96367 TX/PROPH/DG ADDL SEQ IV INF: CPT

## 2024-07-23 PROCEDURE — 84484 ASSAY OF TROPONIN QUANT: CPT | Performed by: EMERGENCY MEDICINE

## 2024-07-23 PROCEDURE — 81003 URINALYSIS AUTO W/O SCOPE: CPT | Performed by: EMERGENCY MEDICINE

## 2024-07-23 PROCEDURE — 85025 COMPLETE CBC W/AUTO DIFF WBC: CPT | Performed by: EMERGENCY MEDICINE

## 2024-07-23 PROCEDURE — 96366 THER/PROPH/DIAG IV INF ADDON: CPT

## 2024-07-23 PROCEDURE — 83880 ASSAY OF NATRIURETIC PEPTIDE: CPT | Performed by: EMERGENCY MEDICINE

## 2024-07-23 PROCEDURE — 93010 ELECTROCARDIOGRAM REPORT: CPT | Mod: ,,, | Performed by: INTERNAL MEDICINE

## 2024-07-23 PROCEDURE — 83735 ASSAY OF MAGNESIUM: CPT | Performed by: EMERGENCY MEDICINE

## 2024-07-23 PROCEDURE — U0002 COVID-19 LAB TEST NON-CDC: HCPCS | Performed by: EMERGENCY MEDICINE

## 2024-07-23 PROCEDURE — 80053 COMPREHEN METABOLIC PANEL: CPT | Performed by: EMERGENCY MEDICINE

## 2024-07-23 PROCEDURE — 96375 TX/PRO/DX INJ NEW DRUG ADDON: CPT

## 2024-07-23 PROCEDURE — 99285 EMERGENCY DEPT VISIT HI MDM: CPT | Mod: 25

## 2024-07-23 PROCEDURE — 98968 PH1 ASSMT&MGMT NQHP 21-30: CPT | Mod: 95,,,

## 2024-07-23 PROCEDURE — 25000003 PHARM REV CODE 250: Performed by: EMERGENCY MEDICINE

## 2024-07-23 RX ORDER — ONDANSETRON HYDROCHLORIDE 2 MG/ML
4 INJECTION, SOLUTION INTRAVENOUS
Status: COMPLETED | OUTPATIENT
Start: 2024-07-23 | End: 2024-07-23

## 2024-07-23 RX ADMIN — POTASSIUM BICARBONATE 50 MEQ: 978 TABLET, EFFERVESCENT ORAL at 01:07

## 2024-07-23 RX ADMIN — SODIUM CHLORIDE 1000 ML: 9 INJECTION, SOLUTION INTRAVENOUS at 12:07

## 2024-07-23 RX ADMIN — PIPERACILLIN SODIUM AND TAZOBACTAM SODIUM 4.5 G: 4; .5 INJECTION, POWDER, FOR SOLUTION INTRAVENOUS at 12:07

## 2024-07-23 RX ADMIN — SODIUM CHLORIDE 1000 ML: 9 INJECTION, SOLUTION INTRAVENOUS at 01:07

## 2024-07-23 RX ADMIN — ONDANSETRON 4 MG: 2 INJECTION INTRAMUSCULAR; INTRAVENOUS at 04:07

## 2024-07-23 RX ADMIN — VANCOMYCIN HYDROCHLORIDE 2000 MG: 10 INJECTION, POWDER, LYOPHILIZED, FOR SOLUTION INTRAVENOUS at 01:07

## 2024-07-23 NOTE — ED NOTES
I-STAT Chem-8+ Results:   Value Reference Range   Sodium 139 136-145 mmol/L   Potassium  2.8 3.5-5.1 mmol/L   Chloride 100  mmol/L   Ionized Calcium 1.05 1.06-1.42 mmol/L   CO2 (measured) 28 23-29 mmol/L   Glucose 108  mg/dL   BUN 23 6-30 mg/dL   Creatinine 1.2 0.5-1.4 mg/dL   Hematocrit 33 36-54%

## 2024-07-23 NOTE — PLAN OF CARE
[facility] - Emergency Dept  Initial Discharge Assessment       Primary Care Provider: Sierra Landry MD    Admission Diagnosis: No admission diagnoses are documented for this encounter.    Admission Date: 7/23/2024  Expected Discharge Date:          Payor: MEDICARE / Plan: MEDICARE PART A & B / Product Type: Government /     Extended Emergency Contact Information  Primary Emergency Contact: Marzena Schwartz   [facility]  Home Phone: [phone]  Mobile Phone: [phone]  Relation: Daughter  Secondary Emergency Contact: Marilou Grimaldo  Mobile Phone: [phone]  Relation: Daughter  Preferred language: English   needed? No    Discharge Plan A: (P) Home with family  Discharge Plan B: (P) Home with family      [facility] [place]  [address] [facility] [facility] [address]  Phone: [phone] Fax: [phone]    [id number] - [place], [place] - [address] [facility]  [address] [facility] [address]  Phone: [phone] Fax: [phone]      Initial Assessment (most recent)       Adult Discharge Assessment - 07/23/24 1501          Discharge Assessment    Assessment Type Discharge Planning Assessment (P)      Confirmed/corrected address, phone number and insurance Yes (P)      Confirmed Demographics Correct on Facesheet (P)      Source of Information patient;family;health record (P)      Do you expect to return to your current living situation? Yes (P)      Do you have help at home or someone to help you manage your care at home? Yes (P)      Prior to hospitilization cognitive status: Alert/Oriented (P)      Current cognitive status: Alert/Oriented (P)      How do you get to doctors appointments? family or friend will provide (P)      Are you on dialysis? No (P)      Discharge Plan A Home with family (P)      Discharge Plan B Home with family (P)      DME Needed Upon Discharge  none (P)         Physical Activity    On average, how many days per week  do you engage in moderate to strenuous exercise (like a brisk walk)? 0 days (P)         Financial Resource Strain    How hard is it for you to pay for the very basics like food, housing, medical care, and heating? Not very hard (P)         Housing Stability    In the last 12 months, was there a time when you were not able to pay the mortgage or rent on time? No (P)         Transportation Needs    Has the lack of transportation kept you from medical appointments, meetings, work or from getting things needed for daily living? No (P)         Food Insecurity    Within the past 12 months, you worried that your food would run out before you got the money to buy more. Never true (P)         Stress    Do you feel stress - tense, restless, nervous, or anxious, or unable to sleep at night because your mind is troubled all the time - these days? To some extent (P)         Social Isolation    How often do you feel lonely or isolated from those around you?  Rarely (P)         Alcohol Use    Q1: How often do you have a drink containing alcohol? Patient declined (P)         Utilities    In the past 12 months has the electric, gas, oil, or water company threatened to shut off services in your home? No (P)         Health Literacy    How often do you need to have someone help you when you read instructions, pamphlets, or other written material from your doctor or pharmacy? Rarely (P)

## 2024-07-23 NOTE — ED TRIAGE NOTES
Jordin Allen Jr., a 78 y.o. male presents to the ED w/ complaint of hypotension. Patient endorses dizziness with ambulation. Denies SOB, chest pain, fever, or chills.     Triage note:  Chief Complaint   Patient presents with    Hypotension     Arrived via ems from home with c/o hypotension at home, also reports dizziness with standing, orthostatic with EMS     Review of patient's allergies indicates:   Allergen Reactions    Brilinta [ticagrelor] Itching    Lisinopril      Other reaction(s): cough    Metoprolol succinate Rash     Past Medical History:   Diagnosis Date    Benign neoplasm of colon 10/01/2013    Bronchitis chronic     CAD (coronary artery disease) 10/31/2013    COPD (chronic obstructive pulmonary disease)     Emphysema of lung     GERD (gastroesophageal reflux disease)     Hx of colonic polyps     Hypertension     NAFLD (nonalcoholic fatty liver disease) 03/27/2017    SHOLA on CPAP     RLS (restless legs syndrome)     Screen for colon cancer 08/30/2013

## 2024-07-23 NOTE — DISCHARGE INSTRUCTIONS
Advised to stand up gently and avoid any sudden postural changes to reduce risk of hypotension and falls.  Continue Home Blood pressure monitoring    Return to Emergency Department if you develop persistent low blood pressure readings at home, or develop symptoms like dizziness, fainting feelings, breathlessness or chest pain.

## 2024-07-23 NOTE — PLAN OF CARE
"D/C planning:    Will go to daughters home upon discharge.    [facility] address:    [address] [place] [address]   [id number]    Jordin "Hari Allen  [phone]    tapan Zamarripa  [phone]  "

## 2024-07-23 NOTE — PLAN OF CARE
Referral sent to [facility]).  Pt prefers a morning slot. Temp address added to demographics.    Shasha Meier LMSW  Case Management [facility]  [phone]

## 2024-07-23 NOTE — ED PROVIDER NOTES
Encounter Date: 7/23/2024       History     Chief Complaint   Patient presents with    Hypotension     Arrived via ems from home with c/o hypotension at home, also reports dizziness with standing, orthostatic with EMS     Mr. Jordin Allen is a  78-year old with a complex medical history significant for  hypertension, Obstructive Sleep Apnea(managed with CPAP), COPD, and Adenocarcinoma of the lungs came into the ER as brought in by EMS with a 3-day history of hypotension and dizziness.  He reports that the hypotensive episodes began 3 days ago and was consistently so, with his measurements as at this morning, so he decided to come in. During these episodes, he has had dizziness, and lightheadedness but denies any syncope. There was associated fatigue. He admits that is appetite and thirst have reduced in the past week, and that he had diarrhea in the past 4 days, with no associated blood in stool. There have been no recent exacerbations of his COPD, and he uses his CPAP and home oxygen as needed.  He has no chest pain, no palpitations, no shortness of breath, and no cough. He has no headache. He has had nausea, but no vomiting. No fever, and no urinary changes.   His medications include Amlodipine, Lisinopril. There are no new medications or dose changes but he was started Immunotherapy last week. He has otherwise been adherent to his medications, and had his antihypertensive medications this morning.    The history is provided by the patient and a relative. No  was used.     Review of patient's allergies indicates:   Allergen Reactions    Brilinta [ticagrelor] Itching    Lisinopril      Other reaction(s): cough    Metoprolol succinate Rash     Past Medical History:   Diagnosis Date    Benign neoplasm of colon 10/01/2013    Bronchitis chronic     CAD (coronary artery disease) 10/31/2013    COPD (chronic obstructive pulmonary disease)     Emphysema of lung     GERD (gastroesophageal reflux  disease)     Hx of colonic polyps     Hypertension     NAFLD (nonalcoholic fatty liver disease) 2017    SHOLA on CPAP     RLS (restless legs syndrome)     Screen for colon cancer 2013     Past Surgical History:   Procedure Laterality Date    bilateral foot surgery      CARDIAC CATHETERIZATION  2013    x1    CATARACT EXTRACTION, BILATERAL      COLON SURGERY      Ace Mott MD    COLONOSCOPY N/A 3/21/2018    Procedure: COLONOSCOPY;  Surgeon: Terry Mott MD;  Location: [facility] ENDO (4TH FLR);  Service: Endoscopy;  Laterality: N/A;    COLONOSCOPY N/A 2019    Procedure: COLONOSCOPY;  Surgeon: John Mantilla MD;  Location: [facility] ENDO ([facility] FLR);  Service: Endoscopy;  Laterality: N/A;    COLONOSCOPY N/A 2022    Procedure: COLONOSCOPY;  Surgeon: MEDINA Farris MD;  [facility]  Service: Endoscopy;  Laterality: N/A;  fully vacc-inst portal-tb    ENDOBRONCHIAL ULTRASOUND Bilateral 2024    Procedure: ENDOBRONCHIAL ULTRASOUND (EBUS);  Surgeon: Naveed Aguero MD;  Location: [facility] OR;  Service: Pulmonary;  Laterality: Bilateral;    scrotal abscess removal       Family History   Problem Relation Name Age of Onset    Heart disease Mother      Heart attack Mother      Hypertension Mother      No Known Problems Sister      No Known Problems Daughter 2     Asthma Neg Hx      Emphysema Neg Hx      Prostate cancer Neg Hx      Cirrhosis Neg Hx       Social History     Tobacco Use    Smoking status: Former     Current packs/day: 0.00     Average packs/day: 1 pack/day for 40.0 years (40.0 ttl pk-yrs)     Types: Cigarettes     Start date: 8/15/1972     Quit date: 8/15/2012     Years since quittin.9     Passive exposure: Never    Smokeless tobacco: Never   Substance Use Topics    Alcohol use: Yes     Alcohol/week: 3.0 standard drinks of alcohol     Types: 3 Cans of beer per week     Comment: maybe 2-3  beers weekly    Drug use: No     Review of Systems    Physical Exam      Initial Vitals [07/23/24 0925]   BP Pulse Resp Temp SpO2   90/68 100 18 98.4 °F (36.9 °C) 98 %      MAP       --         Physical Exam    Constitutional: He is cooperative. He appears distressed.   HENT:   Head: Normocephalic.   Eyes: Conjunctivae, EOM and lids are normal. Right conjunctiva is not injected. Left conjunctiva is not injected.   Neck: Phonation normal. Neck supple. No thyroid mass (There is no thyroid mass but a hanging non-tender fluctuant area of skin below the lower jaw) present. Carotid bruit is not present.       Normal range of motion.  Cardiovascular:  Normal rate, regular rhythm, normal heart sounds and normal pulses.           Pulmonary/Chest: Effort normal and breath sounds normal. He exhibits no tenderness.   Abdominal: Abdomen is soft and protuberant. Bowel sounds are normal. No signs of injury. There is no abdominal tenderness.   Musculoskeletal:      Cervical back: Normal range of motion and neck supple.     Neurological: He is alert. He has normal strength. No cranial nerve deficit or sensory deficit.   Skin: Skin is warm and dry. Capillary refill takes 2 to 3 seconds. Bruising (Small superficial bruise on the lateral part of the forearm, measuring ~3cm) noted. No rash noted. No cyanosis. Nails show no clubbing.        Psychiatric: He has a normal mood and affect. His speech is normal and behavior is normal. Thought content normal. Cognition and memory are normal.         ED Course   Procedures  Labs Reviewed   CBC W/ AUTO DIFFERENTIAL - Abnormal       Result Value    WBC 8.21      RBC 4.23 (*)     Hemoglobin 11.9 (*)     Hematocrit 37.2 (*)     MCV 88      MCH 28.1      MCHC 32.0      RDW 17.6 (*)     Platelets 183      MPV 11.2      Immature Granulocytes 0.4      Gran # (ANC) 6.5      Immature Grans (Abs) 0.03      Lymph # 0.5 (*)     Mono # 1.0      Eos # 0.1      Baso # 0.03      nRBC 0      Gran % 79.5 (*)     Lymph % 6.2 (*)     Mono % 12.3      Eosinophil % 1.2      Basophil %  0.4      Differential Method Automated     COMPREHENSIVE METABOLIC PANEL - Abnormal    Sodium 139      Potassium 3.6      Chloride 102      CO2 21 (*)     Glucose 107      BUN 22      Creatinine 1.2      Calcium 9.0      Total Protein 7.4      Albumin 3.3 (*)     Total Bilirubin 1.4 (*)     Alkaline Phosphatase 100      AST 32      ALT 30      eGFR >60.0      Anion Gap 16     ISTAT LACTATE - Abnormal    POC Lactate 3.58 (*)     Verbal Result Readback Performed Yes      Provider Credentials: MD      Provider Notified: AMA      Time Notifed: 1217      Sample VENOUS      Site Other      Allens Test N/A     ISTAT PROCEDURE - Abnormal    POC Glucose 106      POC BUN 22      POC Creatinine 1.2      POC Sodium 141      POC Potassium 2.7 (*)     POC Chloride 98      POC TCO2 (MEASURED) 28 (*)     POC Ionized Calcium 1.14      POC Hematocrit 31 (*)     Sample EVE     ISTAT PROCEDURE - Abnormal    POC Glucose 108      POC BUN 23      POC Creatinine 1.2      POC Sodium 139      POC Potassium 2.8 (*)     POC Chloride 100      POC TCO2 (MEASURED) 28      POC Ionized Calcium 1.05 (*)     POC Hematocrit 33 (*)     Sample EVE     CULTURE, BLOOD    Blood Culture, Routine No Growth to date     CULTURE, BLOOD    Blood Culture, Routine No Growth to date     MAGNESIUM    Magnesium 2.0     URINALYSIS, REFLEX TO URINE CULTURE    Specimen UA Urine, Clean Catch      Color, UA Yellow      Appearance, UA Clear      pH, UA 7.0      Specific Gravity, UA 1.010      Protein, UA Negative      Glucose, UA Negative      Ketones, UA Negative      Bilirubin (UA) Negative      Occult Blood UA Negative      Nitrite, UA Negative      Leukocytes, UA Negative      Narrative:     Specimen Source->Urine   TROPONIN I    Troponin I <0.006     B-TYPE NATRIURETIC PEPTIDE    BNP 21     SARS-COV-2 RNA AMPLIFICATION, QUAL    SARS-CoV-2 RNA, Amplification, Qual Negative     ISTAT LACTATE    POC Lactate 1.88      Sample VENOUS      Site Other      Allens Test N/A        EKG Readings: (Independently Interpreted)   Initial Reading: No STEMI. Previous EKG: Compared with most recent EKG Rhythm: Normal Sinus Rhythm. Heart Rate: 88. Ectopy: PACs. ST Segments: Non-Specific ST Segment Depression.     ECG Results              EKG 12-lead (Final result)        Collection Time Result Time QRS Duration OHS QTC Calculation    07/23/24 11:48:22 07/23/24 13:43:31 84 491                     Final result by Interface, Lab In [facility] (07/23/24 13:43:37)                   Narrative:    Test Reason : R42,    Vent. Rate : 088 BPM     Atrial Rate : 088 BPM     P-R Int : 196 ms          QRS Dur : 084 ms      QT Int : 406 ms       P-R-T Axes : 070 092 072 degrees     QTc Int : 491 ms    Sinus rhythm with premature atrial complexes  Rightward axis  Low voltage QRS in the precordial leads  Prolonged QT  Abnormal ECG  When compared with ECG of 06-JUN-2024 12:45,  No significant change was found  Confirmed by Jayda Gillespie MD ([id number]) on 7/23/2024 1:43:28 PM    Referred By: [email or web address]   SELF           Confirmed By:Jayda Gillespie MD                                  Imaging Results              X-Ray Chest AP Portable (Final result)  Result time 07/23/24 14:17:13      Final result by Samraa Dockery MD (07/23/24 14:17:13)                   Impression:      No acute intrathoracic process seen.      Electronically signed by: Samara Dockery MD  Date:    07/23/2024  Time:    14:17               Narrative:    EXAMINATION:  XR CHEST AP PORTABLE    CLINICAL HISTORY:  hypotension;    TECHNIQUE:  Single frontal view of the chest was performed.    COMPARISON:  03/19/2024    FINDINGS:  The cardiomediastinal silhouette is normal.  The pulmonary vascularity is normal.    The lungs are clear.    No pleural effusion or pneumothorax.    The osseous structures appear normal.                                    X-Rays:   Independently Interpreted Readings:   Chest X-Ray: Normal heart size.  No acute abnormalities.   No infiltrates. No pneumothorax or free air     Medications   sodium chloride 0.9% bolus 1,000 mL 1,000 mL (0 mLs Intravenous Stopped 7/23/24 1316)   sodium chloride 0.9% bolus 1,000 mL 1,000 mL (0 mLs Intravenous Stopped 7/23/24 1419)   piperacillin-tazobactam (ZOSYN) 4.5 g in D5W 100 mL IVPB (MB+) (0 g Intravenous Stopped 7/23/24 1321)   vancomycin 2 g in dextrose 5 % 500 mL IVPB (0 mg Intravenous Stopped 7/23/24 1550)   potassium bicarbonate disintegrating tablet 50 mEq (50 mEq Oral Given 7/23/24 1306)   ondansetron injection 4 mg (4 mg Intravenous Given 7/23/24 1629)     Medical Decision Making   78-year old with a complex medical history significant for  hypertension, Obstructive Sleep Apnea(managed with CPAP), COPD, and Adenocarcinoma of the lungs came into the ER as brought in by EMS with a 3-day history of hypotension and dizziness.  He reports that the hypotensive episodes began 3 days ago and was consistently so, with his measurements as at this morning, so he decided to come in. During these episodes, he has had dizziness, and lightheadedness but denies any syncope. There was associated fatigue. He admits that is appetite and thirst have reduced in the past week, and that he had diarrhea in the past 4 days, with no associated blood in stool. There have been no recent exacerbations of his COPD, and he uses his CPAP and home oxygen as needed.  He has no chest pain, no palpitations, no shortness of breath, and no cough. He has no headache. He has had nausea, but no vomiting. No fever, and no urinary changes.   His medications include Amlodipine, Lisinopril. There are no new medications or dose changes but he was started Immunotherapy last week. He has otherwise been adherent to his medications, and had his antihypertensive medications this morning.    On examination, he looked  distressed but alert, well oriented articulate and cooperative. He is not pale, anicteric, acyanosed, but moderately dehydrated with  dry mucous membranes  BP= 117/53, Pulse= 100, Peripheral pulses were palpable. Pulmonary effort, and Breath sounds were normal: No wheezing, rhonchi or rales heard    IMPRESSION:  Hypotension secondary to Hypovolemia      PLAN:    potassium bicarbonate disintegrating tablet 50 mEq    sodium chloride 0.9% bolus 1,000 mL 1,000 mL    piperacillin-tazobactam (ZOSYN) 4.5 g in D5W 100 mL IV   vancomycin 2 g in dextrose 5 % 500 mL IV   sodium chloride 0.9% bolus 1,000 mL 1,000 mL    X-Ray Chest AP Portable     07/23/24 1246 07/23/24 1246    ISTAT PROCEDURE    ISTAT Lactate      COVID-19 Rapid Screening   CBC auto differential    Comprehensive metabolic panel    Magnesium    Urinalysis, Reflex to Urine Culture Urine, Clean Catch    Troponin I     Brain natriuretic peptide  ISTAT CHEM8    Blood culture    POCT Venous Blood Gas (Lactate Only)   EKG 12-lead    Ambulatory referral/consult to Acute Care at Home       ondansetron injection 4 mg  for Nausea  If patient is stable and labs check out, could be discharged home on Hypotension and Dizziness instructions.           Amount and/or Complexity of Data Reviewed  Labs: ordered.     Details: Glucose 108, BUN 23, initial lactate 3.58, repeat lactate of 1.8, creatinine 1.2  Radiology: ordered and independent interpretation performed.  ECG/medicine tests: ordered and independent interpretation performed.    Risk  Prescription drug management.  Drug therapy requiring intensive monitoring for toxicity.  Decision regarding hospitalization.              Attending Attestation:   Physician Attestation Statement for Resident:  As the supervising MD   Physician Attestation Statement: I have personally seen and examined this patient.   I agree with the above history.  -:   As the supervising MD I agree with the above PE.     As the supervising MD I agree with the above treatment, course, plan, and disposition.                          I have reviewed and concur with the resident's history,  physical, assessment, and plan.  I have personally interviewed and examined the patient at bedside.   I did supervise any and all procedures and was present for any critical portion, and was always immediately available for help and as a resource.     The above history physical, review of symptoms, HPI and physical exam reflect my independent interpretation and evaluation.    Briefly, 78-year-old male with a history of sleep apnea, hypertension, COPD recent adenocarcinoma of the lungs brought in with symptoms of lightheadedness and hypotension for 3 days.  He was orthostatic hypotension on EMS evaluation, slightly improved with IV fluids.  On arrival, afebrile, vital signs have improved with IV fluids.  He is asymptomatic at this time.  Given history of adenocarcinoma will do broad workup including infectious and metabolic.  ECG without ischemia or STEMI on my read.  Chest x-ray negative for acute findings.  Blood pressure stayed stable and improved with IV fluid resuscitation.  Initial lactate of 3.5, improved with fluid resuscitation to 1.8.  Patient asymptomatic.  Suspect likely orthostatic hypotension with no evidence of cardiac injury or obvious infectious source.  However given risk, discussed observation stay patient declined would prefer to go home.  We were able to set the patient up with the acute care hospital at home for close follow-up including IV fluids as needed, blood work and follow-up on blood culture.  Patient and family agreeable with plan.    Complexity: High Risk    Final diagnoses:  [R42] Dizziness  [I95.1] Orthostatic hypotension (Primary)  [E86.0] Dehydration     Trev Cornejo DO, FAAEM  Emergency Staff Physician   Dept of Emergency Medicine   [facility]  Spectralink: [id number]        Disclaimer: This note has been generated using voice-recognition software. There may be typographical errors that have been missed during proof-reading.                           Clinical  Impression:  Final diagnoses:  [R42] Dizziness  [I95.1] Orthostatic hypotension (Primary)  [E86.0] Dehydration          ED Disposition Condition    Discharge Stable          ED Prescriptions    None       Follow-up Information       Follow up With Specialties Details Why Contact Info    Sierra Landry MD Family Medicine Schedule an appointment as soon as possible for a visit in 1 week  [address] [facility] [address]  [phone]               Swapna Birmingham MD  Resident  07/23/24 1810       Trev Cornejo DO  07/24/24 0707

## 2024-07-23 NOTE — FIRST PROVIDER EVALUATION
"Medical screening examination initiated.  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  Brought in via EMS for hypotension, noticed to be orthostatic.  Drinking enough fluids, recent diarrhea.    Vitals:    07/23/24 0925   BP: 90/68   Pulse: 100   Resp: 18   Temp: 98.4 °F (36.9 °C)   TempSrc: Oral   SpO2: 98%   Weight: 95.3 kg (210 lb)   Height: 5' 8" (1.727 m)       Pertinent physical exam:  Alert and oriented, nontoxic, dry skin, tachycardic heart rate    Brief workup plan:  Basic labs, ECG, IV fluids    Preliminary workup initiated; this workup will be continued and followed by the physician or advanced practice provider that is assigned to the patient when roomed.    Trev Cornejo DO, FAAEM  Emergency Staff Physician   Dept of Emergency Medicine   [facility]  Spectralink: [id number]        Disclaimer: This note has been generated using voice-recognition software. There may be typographical errors that have been missed during proof-reading.    "

## 2024-07-24 ENCOUNTER — PATIENT OUTREACH (OUTPATIENT)
Dept: EMERGENCY MEDICINE | Facility: HOSPITAL | Age: 79
End: 2024-07-24
Payer: MEDICARE

## 2024-07-24 ENCOUNTER — TELEPHONE (OUTPATIENT)
Dept: HEMATOLOGY/ONCOLOGY | Facility: CLINIC | Age: 79
End: 2024-07-24
Payer: MEDICARE

## 2024-07-24 ENCOUNTER — PATIENT MESSAGE (OUTPATIENT)
Dept: ADMINISTRATIVE | Facility: OTHER | Age: 79
End: 2024-07-24
Payer: MEDICARE

## 2024-07-25 ENCOUNTER — PATIENT MESSAGE (OUTPATIENT)
Dept: ADMINISTRATIVE | Facility: OTHER | Age: 79
End: 2024-07-25
Payer: MEDICARE

## 2024-07-25 ENCOUNTER — OFFICE VISIT (OUTPATIENT)
Dept: INTERNAL MEDICINE | Facility: CLINIC | Age: 79
End: 2024-07-25
Payer: MEDICARE

## 2024-07-25 VITALS
OXYGEN SATURATION: 99 % | BODY MASS INDEX: 32.18 KG/M2 | TEMPERATURE: 98 F | WEIGHT: 211.63 LBS | SYSTOLIC BLOOD PRESSURE: 104 MMHG | HEART RATE: 96 BPM | DIASTOLIC BLOOD PRESSURE: 62 MMHG

## 2024-07-25 DIAGNOSIS — Z99.81 OXYGEN DEPENDENT: ICD-10-CM

## 2024-07-25 DIAGNOSIS — C34.92 ADENOCARCINOMA, LUNG, LEFT: ICD-10-CM

## 2024-07-25 DIAGNOSIS — I95.9 HYPOTENSION, UNSPECIFIED HYPOTENSION TYPE: Primary | ICD-10-CM

## 2024-07-25 PROCEDURE — 99214 OFFICE O/P EST MOD 30 MIN: CPT | Mod: S$PBB,,, | Performed by: NURSE PRACTITIONER

## 2024-07-25 PROCEDURE — 99214 OFFICE O/P EST MOD 30 MIN: CPT | Mod: PBBFAC,PO | Performed by: NURSE PRACTITIONER

## 2024-07-25 PROCEDURE — 99999 PR PBB SHADOW E&M-EST. PATIENT-LVL IV: CPT | Mod: PBBFAC,,, | Performed by: NURSE PRACTITIONER

## 2024-07-25 NOTE — PROGRESS NOTES
Subjective     Patient ID: Jordin Allen Jr. is a 78 y.o. male.    Chief Complaint: Follow-up (Hospital visit)    Mr. Jordin Allen is a  78-year old with a complex medical history significant for  hypertension, Obstructive Sleep Apnea(managed with CPAP), COPD, and Adenocarcinoma of the lungs came into the ER as brought in by EMS with a 3-day history of hypotension and dizziness.  He reports that the hypotensive episodes began 3 days ago and was consistently so, with his measurements as at this morning, so he decided to come in. During these episodes, he has had dizziness, and lightheadedness but denies any syncope. There was associated fatigue. He admits that is appetite and thirst have reduced in the past week, and that he had diarrhea in the past 4 days, with no associated blood in stool. There have been no recent exacerbations of his COPD, and he uses his CPAP and home oxygen as needed.  He has no chest pain, no palpitations, no shortness of breath, and no cough. He has no headache. He has had nausea, but no vomiting. No fever, and no urinary changes.   His medications include Amlodipine, Lisinopril. There are no new medications or dose changes but he was started Immunotherapy last week. He has otherwise been adherent to his medications, and had his antihypertensive medications this morning.     Medical Decision Making   78-year old with a complex medical history significant for  hypertension, Obstructive Sleep Apnea(managed with CPAP), COPD, and Adenocarcinoma of the lungs came into the ER as brought in by EMS with a 3-day history of hypotension and dizziness.  He reports that the hypotensive episodes began 3 days ago and was consistently so, with his measurements as at this morning, so he decided to come in. During these episodes, he has had dizziness, and lightheadedness but denies any syncope. There was associated fatigue. He admits that is appetite and thirst have reduced in the past week, and that he  had diarrhea in the past 4 days, with no associated blood in stool. There have been no recent exacerbations of his COPD, and he uses his CPAP and home oxygen as needed.  He has no chest pain, no palpitations, no shortness of breath, and no cough. He has no headache. He has had nausea, but no vomiting. No fever, and no urinary changes.   His medications include Amlodipine, Lisinopril. There are no new medications or dose changes but he was started Immunotherapy last week. He has otherwise been adherent to his medications, and had his antihypertensive medications this morning.     On examination, he looked  distressed but alert, well oriented articulate and cooperative. He is not pale, anicteric, acyanosed, but moderately dehydrated with dry mucous membranes  BP= 117/53, Pulse= 100, Peripheral pulses were palpable. Pulmonary effort, and Breath sounds were normal: No wheezing, rhonchi or rales heard.'    Since discharge he continues to feel very fatigued. He has continued taking his BP medications which include Losartan 100 mg , Amlodipine 5 mg and Hctz 25 mg QD. He is uncertain what his premedication blood pressures are as his home cuff is not functioning. She continues to deny any worsening SOB, CP or palpitations. He continues with home O2 2L NC. He attempted to remain hydrated since his EF diagnosis was due to dehydration. His increased fluid intake made him feel sick to his stomach.     Follow-up  Associated symptoms include weakness.     Review of Systems   Neurological:  Positive for weakness.   All other systems reviewed and are negative.     Objective   Physical Exam  Constitutional: He is cooperative. He appears mildly distressed/frustrated  HENT:   Head: Normocephalic.   Eyes: Conjunctivae, EOM and lids are normal. Right conjunctiva is not injected. Left conjunctiva is not injected.   Neck: Phonation normal. Neck supple. No thyroid mass (There is no thyroid mass but a hanging non-tender fluctuant area of  skin below the lower jaw) present. Carotid bruit is not present.        Normal range of motion.  Cardiovascular:  Normal rate, regular rhythm, normal heart sounds and normal pulses.           Pulmonary/Chest: Effort normal and breath sounds normal. He exhibits no tenderness.   Abdominal: Abdomen is soft and protuberant. Bowel sounds are normal. No signs of injury. There is no abdominal tenderness.   Musculoskeletal:      Cervical back: Normal range of motion and neck supple.      Neurological: He is alert. He has normal strength. No cranial nerve deficit or sensory deficit.   Skin: Skin is warm and dry. Capillary refill takes 2 to 3 seconds. Bruising (Small superficial bruise on the lateral part of the forearm, measuring ~3cm) noted. No rash noted. No cyanosis. Nails show no clubbing.         Psychiatric: He has a normal mood and affect. His speech is normal and behavior is normal. Thought content normal. Cognition and memory are normal.       Assessment and Plan   1. Hypotension, unspecified hypotension type  Assessment & Plan:  I suspect pt's hypotension may be due to current medication regimen.   D/c HCTZ and amlodipine for now  Continue only with Losartan 100 mg QD with recommendation to hold if SBP<110.     F/u in 1 week for re-evaluation  Call office PRN      2. Adenocarcinoma, lung, left  Assessment & Plan:  Chronic.  Please to continue follow up with Oncology for ongoing care.       3. Oxygen dependent  Assessment & Plan:  2L O2 NC.   Continue with current regimen      RTC in 1 week for hypotension follow up.    Complex Repair And Transposition Flap Text: The defect edges were debeveled with a #15 scalpel blade.  The primary defect was closed partially with a complex linear closure.  Given the location of the remaining defect, shape of the defect and the proximity to free margins a transposition flap was deemed most appropriate for complete closure of the defect.  Using a sterile surgical marker, an appropriate advancement flap was drawn incorporating the defect and placing the expected incisions within the relaxed skin tension lines where possible.    The area thus outlined was incised deep to adipose tissue with a #15 scalpel blade.  The skin margins were undermined to an appropriate distance in all directions utilizing iris scissors.

## 2024-07-25 NOTE — ASSESSMENT & PLAN NOTE
I suspect pt's hypotension may be due to current medication regimen.   D/c HCTZ and amlodipine for now  Continue only with Losartan 100 mg QD with recommendation to hold if SBP<110.     F/u in 1 week for re-evaluation  Call office PRN

## 2024-07-26 ENCOUNTER — PATIENT MESSAGE (OUTPATIENT)
Dept: ADMINISTRATIVE | Facility: OTHER | Age: 79
End: 2024-07-26
Payer: MEDICARE

## 2024-07-26 ENCOUNTER — HOSPITAL ENCOUNTER (OUTPATIENT)
Dept: RADIOLOGY | Facility: HOSPITAL | Age: 79
Discharge: HOME | End: 2024-07-26
Attending: HOSPITALIST
Payer: MEDICARE

## 2024-07-26 DIAGNOSIS — G93.9 LESION OF BRAIN: ICD-10-CM

## 2024-07-26 PROCEDURE — 25500020 PHARM REV CODE 255: Performed by: HOSPITALIST

## 2024-07-26 PROCEDURE — 70553 MRI BRAIN STEM W/O & W/DYE: CPT | Mod: TC

## 2024-07-26 PROCEDURE — A9585 GADOBUTROL INJECTION: HCPCS | Performed by: HOSPITALIST

## 2024-07-26 PROCEDURE — 70553 MRI BRAIN STEM W/O & W/DYE: CPT | Mod: 26,,, | Performed by: RADIOLOGY

## 2024-07-26 RX ORDER — GADOBUTROL 604.72 MG/ML
10 INJECTION INTRAVENOUS
Status: COMPLETED | OUTPATIENT
Start: 2024-07-26 | End: 2024-07-26

## 2024-07-26 RX ADMIN — GADOBUTROL 10 ML: 604.72 INJECTION INTRAVENOUS at 02:07

## 2024-07-27 ENCOUNTER — PATIENT MESSAGE (OUTPATIENT)
Dept: ADMINISTRATIVE | Facility: OTHER | Age: 79
End: 2024-07-27
Payer: MEDICARE

## 2024-07-28 ENCOUNTER — PATIENT MESSAGE (OUTPATIENT)
Dept: ADMINISTRATIVE | Facility: OTHER | Age: 79
End: 2024-07-28
Payer: MEDICARE

## 2024-07-28 LAB
BACTERIA BLD CULT: NORMAL
BACTERIA BLD CULT: NORMAL

## 2024-07-29 ENCOUNTER — PATIENT MESSAGE (OUTPATIENT)
Dept: ADMINISTRATIVE | Facility: OTHER | Age: 79
End: 2024-07-29
Payer: MEDICARE

## 2024-07-29 ENCOUNTER — TELEPHONE (OUTPATIENT)
Dept: HEMATOLOGY/ONCOLOGY | Facility: CLINIC | Age: 79
End: 2024-07-29
Payer: MEDICARE

## 2024-07-29 NOTE — TELEPHONE ENCOUNTER
Contacted pt. He states that he is gradually beginning to feel better. He says that zofran has been very helpful. He also notes that he has tapered his anti-HTN due to hypotension to good effect.     Discussed the results of the MRI, and that they are concerning for oligometastatic disease in the brain. Discussed that this would require radiation therapy for treatment. Dr. Deng aware and will schedule the patient.

## 2024-07-30 ENCOUNTER — PATIENT MESSAGE (OUTPATIENT)
Dept: ADMINISTRATIVE | Facility: OTHER | Age: 79
End: 2024-07-30
Payer: MEDICARE

## 2024-07-30 ENCOUNTER — OFFICE VISIT (OUTPATIENT)
Dept: RADIATION ONCOLOGY | Facility: CLINIC | Age: 79
End: 2024-07-30
Payer: MEDICARE

## 2024-07-30 ENCOUNTER — PATIENT MESSAGE (OUTPATIENT)
Dept: RADIATION ONCOLOGY | Facility: CLINIC | Age: 79
End: 2024-07-30
Payer: MEDICARE

## 2024-07-30 ENCOUNTER — OFFICE VISIT (OUTPATIENT)
Dept: NEUROSURGERY | Facility: CLINIC | Age: 79
End: 2024-07-30
Payer: MEDICARE

## 2024-07-30 VITALS
SYSTOLIC BLOOD PRESSURE: 104 MMHG | BODY MASS INDEX: 30.36 KG/M2 | HEART RATE: 90 BPM | RESPIRATION RATE: 17 BRPM | DIASTOLIC BLOOD PRESSURE: 64 MMHG | WEIGHT: 205 LBS | HEIGHT: 69 IN | TEMPERATURE: 98 F | OXYGEN SATURATION: 95 %

## 2024-07-30 DIAGNOSIS — C34.92 ADENOCARCINOMA, LUNG, LEFT: Primary | ICD-10-CM

## 2024-07-30 DIAGNOSIS — C79.31 SECONDARY MALIGNANT NEOPLASM OF BRAIN: Primary | ICD-10-CM

## 2024-07-30 DIAGNOSIS — C79.31 SECONDARY MALIGNANT NEOPLASM OF BRAIN: ICD-10-CM

## 2024-07-30 PROCEDURE — 99999 PR PBB SHADOW E&M-EST. PATIENT-LVL IV: CPT | Mod: PBBFAC,,, | Performed by: RADIOLOGY

## 2024-07-30 PROCEDURE — 99212 OFFICE O/P EST SF 10 MIN: CPT | Mod: PBBFAC,27 | Performed by: NEUROLOGICAL SURGERY

## 2024-07-30 PROCEDURE — 99204 OFFICE O/P NEW MOD 45 MIN: CPT | Mod: S$PBB,,, | Performed by: NEUROLOGICAL SURGERY

## 2024-07-30 PROCEDURE — 99214 OFFICE O/P EST MOD 30 MIN: CPT | Mod: PBBFAC | Performed by: RADIOLOGY

## 2024-07-30 PROCEDURE — 99999 PR PBB SHADOW E&M-EST. PATIENT-LVL II: CPT | Mod: PBBFAC,,, | Performed by: NEUROLOGICAL SURGERY

## 2024-07-30 NOTE — PROGRESS NOTES
7/30/2024    Radiation Oncology Follow-Up Visit      Prior Radiation History:   Course 1:    Site  Energy  Dose/Fx (Gy)  #Fx  Total Dose (Gy)  Start Date  End Date  Elapsed Days    Left hilum/lung  6X  2  30 / 30  60  5/8/2024 6/18/2024  41        Is the patient female between ages 15-55:  No    Does the patient have a CIED:  No      Assessment   This is a 78 y.o. 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 Deng. Staging MRI Brain 4/3/24 demonstrated a 0.8 cm Left thalamic metastasis; this was observed. MRI Brain 7/26/24 demonstrated interval increase in size to 2.2 cm; no other evidence of intracranial disease. He is referred for consideration of treatment options.     He has recovered from prior thoracic RT. Denies focal numbness or weakness.     I recommend treating the Left thalamic metastasis to 27 Gy in 3 fx with Gamma Knife radiosurgery. I discussed potential side effects including short-term vasogenic edema and late radiation necrosis, which could result in neurologic deficits. At the end of our discussion, he was in agreement with proceeding with the recommended treatment.           Plan   1) Schedule Gamma Knife radiosurgery (frameless, likely 3 fractions)           CHIEF COMPLAINT: Brain metastasis    HPI/Focused ROS:       Past Medical History:   Diagnosis Date    Benign neoplasm of colon 10/01/2013    Bronchitis chronic     CAD (coronary artery disease) 10/31/2013    COPD (chronic obstructive pulmonary disease)     Emphysema of lung     GERD (gastroesophageal reflux disease)     Hx of colonic polyps     Hypertension     NAFLD (nonalcoholic fatty liver disease) 03/27/2017    SHOLA on CPAP     RLS (restless legs syndrome)     Screen for colon cancer 08/30/2013       Past Surgical History:   Procedure Laterality Date    bilateral foot surgery  1993    CARDIAC CATHETERIZATION  2013    x1    CATARACT  EXTRACTION, BILATERAL      COLON SURGERY      Ace Mott MD    COLONOSCOPY N/A 3/21/2018    Procedure: COLONOSCOPY;  Surgeon: Terry Mott MD;  Location: [facility] (4TH FLR);  Service: Endoscopy;  Laterality: N/A;    COLONOSCOPY N/A 2019    Procedure: COLONOSCOPY;  Surgeon: John Mantilla MD;  Location: [facility] ENDO (4TH FLR);  Service: Endoscopy;  Laterality: N/A;    COLONOSCOPY N/A 2022    Procedure: COLONOSCOPY;  Surgeon: MEDINA Farris MD;  [facility]  Service: Endoscopy;  Laterality: N/A;  fully vacc-inst portal-tb    ENDOBRONCHIAL ULTRASOUND Bilateral 2024    Procedure: ENDOBRONCHIAL ULTRASOUND (EBUS);  Surgeon: Naveed Aguero MD;  Location: [facility] OR;  Service: Pulmonary;  Laterality: Bilateral;    scrotal abscess removal         Social History     Tobacco Use    Smoking status: Former     Current packs/day: 0.00     Average packs/day: 1 pack/day for 40.0 years (40.0 ttl pk-yrs)     Types: Cigarettes     Start date: 8/15/1972     Quit date: 8/15/2012     Years since quittin.9     Passive exposure: Never    Smokeless tobacco: Never   Substance Use Topics    Alcohol use: Yes     Alcohol/week: 3.0 standard drinks of alcohol     Types: 3 Cans of beer per week     Comment: maybe 2-3  beers weekly    Drug use: No       Cancer-related family history is negative for Prostate cancer.    Current Outpatient Medications on File Prior to Visit   Medication Sig Dispense Refill    albuterol (ACCUNEB) 0.63 mg/3 mL Nebu Take 3 mLs (0.63 mg total) by nebulization every 6 (six) hours as needed (if symptoms failed to improve with inhaler). Rescue 375 mL 11    albuterol (PROVENTIL/VENTOLIN HFA) 90 mcg/actuation inhaler Inhale 2 puffs into the lungs every 4 (four) hours as needed for Wheezing. 8.5 g 11    amitriptyline (ELAVIL) 25 MG tablet Take 1 tablet (25 mg total) by mouth once daily. 90 tablet 1    aspirin (ECOTRIN) 81 MG EC tablet Take 81 mg by mouth once daily.        atorvastatin (LIPITOR) 80 MG tablet TAKE 1 TABLET (80 MG TOTAL) BY MOUTH ONCE DAILY. (Patient taking differently: every evening. TAKE 1 TABLET (80 MG TOTAL) BY MOUTH ONCE DAILY.) 90 tablet 3    BETA-CAROTENE,A, W-C & E/MIN (OCUVITE ORAL) Take 1 capsule by mouth once daily.       budesonide-glycopyr-formoterol (BREZTRI AEROSPHERE) 160-9-4.8 mcg/actuation HFAA Inhale 2 puffs into the lungs 2 (two) times a day. 10.7 g 3    echinacea 400 mg Cap Take 1 capsule by mouth once daily.       fluticasone propionate (FLONASE) 50 mcg/actuation nasal spray 2 sprays (100 mcg total) by Each Nostril route once daily. 16 g 11    latanoprost 0.005 % ophthalmic solution Place 1 drop into both eyes once daily.       latanoprost 0.005 % ophthalmic solution INSTILL 1 DROP INTO BOTH EYES AT BEDTIME 7.5 mL 3    losartan (COZAAR) 100 MG tablet Take 1 tablet (100 mg total) by mouth once daily. 90 tablet 2    nitroGLYCERIN (NITROSTAT) 0.4 MG SL tablet Place 1 tablet (0.4 mg total) under the tongue every 5 (five) minutes as needed. 100 tablet 3    omeprazole (PRILOSEC) 20 MG capsule Take 1 capsule (20 mg total) by mouth once daily. 90 capsule 3    ondansetron (ZOFRAN-ODT) 8 MG TbDL Place 1 tablet every 8 hours by translingual route as needed. 30 tablet 0    roflumilast (DALIRESP) 500 mcg Tab Take 1 tablet (500 mcg total) by mouth once daily. 90 tablet 3     Current Facility-Administered Medications on File Prior to Visit   Medication Dose Route Frequency Provider Last Rate Last Admin    dextrose 50% injection 12.5 g  12.5 g Intravenous PRN Lidia Deleon MD        dextrose 50% injection 25 g  25 g Intravenous PRN Lidia Deleon MD        insulin regular injection 0-8 Units  0-8 Units Intravenous Continuous PRN Lidia Deleon MD           Review of patient's allergies indicates:   Allergen Reactions    Brilinta [ticagrelor] Itching    Lisinopril      Other reaction(s): cough    Metoprolol succinate Rash         Vital Signs: /64  "(BP Location: Right arm, Patient Position: Sitting)   Pulse 90   Temp 97.7 °F (36.5 °C)   Resp 17   Ht 5' 9" (1.753 m)   Wt 93 kg (205 lb)   SpO2 95%   BMI 30.27 kg/m²     ECOG Performance Status: (2) Ambulatory and capable of self care, unable to carry out work activity, up and about > 50% or waking hours    Physical Exam  Constitutional:       Appearance: Normal appearance.   HENT:      Head: Normocephalic and atraumatic.   Eyes:      General: No scleral icterus.     Extraocular Movements: Extraocular movements intact.   Pulmonary:      Effort: No accessory muscle usage or respiratory distress.      Comments: On supplemental O2  Musculoskeletal:      Cervical back: Normal range of motion.   Neurological:      Mental Status: He is alert and oriented to person, place, and time.      Comments: In clinic supplied wheelchair   Psychiatric:         Mood and Affect: Mood and affect normal.         Judgment: Judgment normal.          Labs:    Imaging: I have personally reviewed the patient's available images and reports and summarized pertinent findings above in HPI.     Pathology: No new path      "

## 2024-07-30 NOTE — PATIENT INSTRUCTIONS
I have personally reviewed the MRI BRAIN W WO CONTRAST with the pt which shows increased size of peripherally enhancing cystic lesion in the left thalamus, now measuring 2.2 cm.  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  No new lesion.  Only mild edema without midline shift.    I recommend SRS gamma knife (focused radiation). I have discussed the risks/benefits, indications, and alternatives for the proposed procedure in detail. I have answered all of their questions and patient wish to proceed with radiosurgery. We will schedule patient.    I will discuss with my partner, Dr. OSWALDO Henson (rad oncologist) for co-treatment planning.

## 2024-07-30 NOTE — PROGRESS NOTES
Subjective:   I, Errol Mayer, attest that this documentation has been prepared under the direction and in the presence of Steven Campos MD.     Patient ID: Jordin Allen Jr. is a 78 y.o. male     Chief Complaint: No chief complaint on file.    HPI  Mr. Jordin Allen Jr. is a 78 y.o. gentleman with h/o HTN, SHOLA, COPD, adenocarcinoma of the lungs presents to the NSGY clinic today to establish care.  This is a pt who recently presented to the ED with a 3 day history of hypotension and dizziness. After stabilizing the pt's blood pressure, the pt was worked up in the ER and MRI imaging revealed a cystic lesion measuring 2.2cm in the left thalamus with peripheral enhancement.  The pt reports that he is not feeling well. He had lost his appetite and experienced nausea.  His nausea is relieved by Zofran. He states that although his nausea has improved, he still maintains little appetite.       Review of Systems   Constitutional:  Positive for appetite change. Negative for activity change, fatigue, fever and unexpected weight change.   HENT:  Negative for facial swelling.    Eyes: Negative.    Respiratory: Negative.     Cardiovascular: Negative.    Gastrointestinal:  Positive for nausea. Negative for diarrhea and vomiting.   Endocrine: Negative.    Genitourinary: Negative.    Musculoskeletal:  Negative for back pain, joint swelling, myalgias and neck pain.   Neurological:  Negative for dizziness, seizures, weakness, numbness and headaches.   Psychiatric/Behavioral: Negative.          Past Medical History:   Diagnosis Date    Benign neoplasm of colon 10/01/2013    Bronchitis chronic     CAD (coronary artery disease) 10/31/2013    COPD (chronic obstructive pulmonary disease)     Emphysema of lung     GERD (gastroesophageal reflux disease)     Hx of colonic polyps     Hypertension     NAFLD (nonalcoholic fatty liver disease) 03/27/2017    SHOLA on CPAP     RLS (restless legs syndrome)     Screen for colon cancer  08/30/2013       Objective:    There were no vitals filed for this visit.   Physical Exam  Constitutional:       General: He is not in acute distress.     Appearance: Normal appearance.   HENT:      Head: Normocephalic and atraumatic.   Pulmonary:      Effort: Pulmonary effort is normal.   Musculoskeletal:      Cervical back: Neck supple.   Neurological:      Mental Status: He is alert and oriented to person, place, and time.      GCS: GCS eye subscore is 4. GCS verbal subscore is 5. GCS motor subscore is 6.      Cranial Nerves: No cranial nerve deficit.          IMAGING:  MRI BRAIN W WO CONTRAST 7/29/24:  Increased size of peripherally enhancing cystic lesion in the left thalamus, now measuring 2.2 cm.  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  No new lesion.  Only mild edema without midline shift.    I have personally reviewed the images with the pt.      I, Dr. Steven Campos, personally performed the services described in this documentation. All medical record entries made by the scribe, Errol Mayer, were at my direction and in my presence.  I have reviewed the chart and agree that the record reflects my personal performance and is accurate and complete. Steven Campos MD. 07/30/2024    Assessment:       Lung cancer  2.   Brain metastasis  Plan:   I have personally reviewed the MRI BRAIN W WO CONTRAST with the pt which shows increased size of peripherally enhancing cystic lesion in the left thalamus, now measuring 2.2 cm.  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  No new lesion.  Only mild edema without midline shift.    I recommend SRS gamma knife (focused radiation). I have discussed the risks/benefits, indications, and alternatives for the proposed procedure in detail. I have answered all of their questions and patient wish to proceed with radiosurgery. We will schedule patient.    I will discuss with my partner, Dr. CHACON  Sixto (rad oncologist) for co-treatment planning.

## 2024-07-30 NOTE — TELEPHONE ENCOUNTER
Meg MorrisonrGeg Allen,    I talked to Dr. Bienvenido Hneson, our brain expert, as I mentioned over the phone. Arranging for you to have a consult with him asap. His nurse, Gerri will be reaching out to you to schedule a consult. Let me know if I can help further.

## 2024-07-31 ENCOUNTER — TELEPHONE (OUTPATIENT)
Dept: HEMATOLOGY/ONCOLOGY | Facility: CLINIC | Age: 79
End: 2024-07-31
Payer: MEDICARE

## 2024-07-31 ENCOUNTER — PATIENT MESSAGE (OUTPATIENT)
Dept: ADMINISTRATIVE | Facility: OTHER | Age: 79
End: 2024-07-31
Payer: MEDICARE

## 2024-07-31 ENCOUNTER — EXTERNAL CHRONIC CARE MANAGEMENT (OUTPATIENT)
Dept: PRIMARY CARE CLINIC | Facility: CLINIC | Age: 79
End: 2024-07-31
Payer: MEDICARE

## 2024-07-31 DIAGNOSIS — R53.83 FATIGUE, UNSPECIFIED TYPE: Primary | ICD-10-CM

## 2024-07-31 PROCEDURE — 99439 CHRNC CARE MGMT STAF EA ADDL: CPT | Mod: PBBFAC,PO | Performed by: INTERNAL MEDICINE

## 2024-07-31 PROCEDURE — 99490 CHRNC CARE MGMT STAFF 1ST 20: CPT | Mod: PBBFAC,PO | Performed by: INTERNAL MEDICINE

## 2024-07-31 PROCEDURE — 99490 CHRNC CARE MGMT STAFF 1ST 20: CPT | Mod: S$PBB,,, | Performed by: INTERNAL MEDICINE

## 2024-07-31 PROCEDURE — 99439 CHRNC CARE MGMT STAF EA ADDL: CPT | Mod: S$PBB,,, | Performed by: INTERNAL MEDICINE

## 2024-07-31 NOTE — TELEPHONE ENCOUNTER
I spoke to Marilou again. He father is feeling better, but would still like to come tomorrow. We scheduled his labs for 8 a.m. He is to see Zabrina at 3:30. She asked if they should keep the appointment with her? I told her let's leave it for now and see how he feels tomorrow and what the labs show. I told her I would have someone touch base with her tomorrow mid day to see how he feels and what the  labs show. She verbalized understanding.

## 2024-07-31 NOTE — TELEPHONE ENCOUNTER
I spoke to patients daughter, Marilou. Said her father isn't feeling well, having a hard time getting out of bed. Pain 5/10, lots of nausea, not eating. Acute Care with [facility] came this morning and gave him some IVF's, but he just keeps saying he wants to go to the hospital. They are requesting admit but don't want to go through the ED. I told her we would at least have to see him prior to admitting. They aren't able to do anything until tomorrow afternoon, so I scheduled him with Zabrina for a visit. I told Marilou that if he gets worse, please take him to the ED.

## 2024-07-31 NOTE — TELEPHONE ENCOUNTER
"----- Message from Reyna Pascal MA sent at 7/31/2024  9:49 AM CDT -----    ----- Message -----  From: Popeye Boucher  Sent: 7/31/2024   9:31 AM CDT  To: Savannah Dexter Staff    Consult/Advisory    Name Of Caller: Marilou Grimaldo [Daughter]       Contact Preference?: [phone]     Provider Name: Savannah    Does patient feel the need to be seen today? Yes    What is the nature of the call?: Requesting for pt to be admitted today. Stating pt has been very weak (to the point where he can't even get out of bed) and also seems like he's currently "hanging on by a thread".    Additional Notes: Stating she she wants pt to avoid having to go to the ER    "Thank you for all that you do for our patients"  "

## 2024-07-31 NOTE — TELEPHONE ENCOUNTER
----- Message from Reyna Pascal MA sent at 7/31/2024 11:47 AM CDT -----  Regarding: FW: Missed Call  Contact: [phone]    ----- Message -----  From: Alee Mariano  Sent: 7/31/2024  11:46 AM CDT  To: Savannah Dexter Staff  Subject: Missed Call                                      Returning a Missed Call         Caller:  Marilou  (Daughter)                   Returning call to:  Maria Eugenia Mahaner can be reached @:[phone] Mobile)         Nature of the call:  Missed Call

## 2024-08-01 ENCOUNTER — OFFICE VISIT (OUTPATIENT)
Dept: INTERNAL MEDICINE | Facility: CLINIC | Age: 79
End: 2024-08-01
Payer: MEDICARE

## 2024-08-01 ENCOUNTER — LAB VISIT (OUTPATIENT)
Dept: LAB | Facility: HOSPITAL | Age: 79
End: 2024-08-01
Payer: MEDICARE

## 2024-08-01 ENCOUNTER — OFFICE VISIT (OUTPATIENT)
Dept: HEMATOLOGY/ONCOLOGY | Facility: CLINIC | Age: 79
End: 2024-08-01
Payer: MEDICARE

## 2024-08-01 ENCOUNTER — INFUSION (OUTPATIENT)
Dept: INFUSION THERAPY | Facility: HOSPITAL | Age: 79
End: 2024-08-01
Attending: HOSPITALIST
Payer: MEDICARE

## 2024-08-01 VITALS
HEIGHT: 68 IN | BODY MASS INDEX: 31.5 KG/M2 | OXYGEN SATURATION: 96 % | DIASTOLIC BLOOD PRESSURE: 62 MMHG | SYSTOLIC BLOOD PRESSURE: 125 MMHG | HEART RATE: 90 BPM | TEMPERATURE: 98 F | WEIGHT: 207.88 LBS | RESPIRATION RATE: 18 BRPM

## 2024-08-01 VITALS
TEMPERATURE: 98 F | OXYGEN SATURATION: 96 % | DIASTOLIC BLOOD PRESSURE: 66 MMHG | WEIGHT: 205 LBS | SYSTOLIC BLOOD PRESSURE: 108 MMHG | HEART RATE: 86 BPM | BODY MASS INDEX: 30.36 KG/M2 | RESPIRATION RATE: 16 BRPM | HEIGHT: 69 IN

## 2024-08-01 VITALS — HEART RATE: 82 BPM | RESPIRATION RATE: 16 BRPM | DIASTOLIC BLOOD PRESSURE: 69 MMHG | SYSTOLIC BLOOD PRESSURE: 149 MMHG

## 2024-08-01 DIAGNOSIS — C34.12 ADENOCARCINOMA OF UPPER LOBE OF LEFT LUNG: Primary | ICD-10-CM

## 2024-08-01 DIAGNOSIS — R06.00 DYSPNEA, UNSPECIFIED TYPE: ICD-10-CM

## 2024-08-01 DIAGNOSIS — R53.83 FATIGUE, UNSPECIFIED TYPE: ICD-10-CM

## 2024-08-01 DIAGNOSIS — F41.9 ANXIETY: ICD-10-CM

## 2024-08-01 DIAGNOSIS — C77.1 SECONDARY MALIGNANT NEOPLASM OF INTRATHORACIC LYMPH NODES: ICD-10-CM

## 2024-08-01 DIAGNOSIS — C79.9 METASTATIC NEOPLASM: ICD-10-CM

## 2024-08-01 DIAGNOSIS — E80.6 HYPERBILIRUBINEMIA: ICD-10-CM
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
## 2024-08-01 DIAGNOSIS — E86.0 DEHYDRATION: ICD-10-CM

## 2024-08-01 DIAGNOSIS — R19.7 DIARRHEA, UNSPECIFIED TYPE: ICD-10-CM

## 2024-08-01 DIAGNOSIS — R63.0 DECREASED APPETITE: ICD-10-CM

## 2024-08-01 DIAGNOSIS — C34.12 ADENOCARCINOMA OF UPPER LOBE OF LEFT LUNG: ICD-10-CM

## 2024-08-01 DIAGNOSIS — I95.9 HYPOTENSION, UNSPECIFIED HYPOTENSION TYPE: Primary | ICD-10-CM

## 2024-08-01 DIAGNOSIS — C34.92 ADENOCARCINOMA, LUNG, LEFT: Primary | ICD-10-CM

## 2024-08-01 DIAGNOSIS — R62.7 FAILURE TO THRIVE IN ADULT: ICD-10-CM

## 2024-08-01 DIAGNOSIS — G93.9 LESION OF BRAIN: ICD-10-CM

## 2024-08-01 DIAGNOSIS — R53.83 FATIGUE DUE TO RADIATION THERAPY: ICD-10-CM

## 2024-08-01 LAB
ALBUMIN SERPL BCP-MCNC: 3.1 G/DL (ref 3.5–5.2)
ALP SERPL-CCNC: 93 U/L (ref 55–135)
ALT SERPL W/O P-5'-P-CCNC: 28 U/L (ref 10–44)
ANION GAP SERPL CALC-SCNC: 12 MMOL/L (ref 8–16)
AST SERPL-CCNC: 30 U/L (ref 10–40)
BILIRUB SERPL-MCNC: 1.3 MG/DL (ref 0.1–1)
BUN SERPL-MCNC: 12 MG/DL (ref 8–23)
C DIFF GDH STL QL: NEGATIVE
C DIFF TOX A+B STL QL IA: NEGATIVE
CALCIUM SERPL-MCNC: 9.3 MG/DL (ref 8.7–10.5)
CHLORIDE SERPL-SCNC: 104 MMOL/L (ref 95–110)
CO2 SERPL-SCNC: 25 MMOL/L (ref 23–29)
CORTIS SERPL-MCNC: 20.5 UG/DL (ref 4.3–22.4)
CREAT SERPL-MCNC: 1.4 MG/DL (ref 0.5–1.4)
ERYTHROCYTE [DISTWIDTH] IN BLOOD BY AUTOMATED COUNT: 16.2 % (ref 11.5–14.5)
ERYTHROCYTE [DISTWIDTH] IN BLOOD BY AUTOMATED COUNT: 16.2 % (ref 11.5–14.5)
EST. GFR  (NO RACE VARIABLE): 52.1 ML/MIN/1.73 M^2
GLUCOSE SERPL-MCNC: 97 MG/DL (ref 70–110)
HCT VFR BLD AUTO: 33.2 % (ref 40–54)
HCT VFR BLD AUTO: 33.2 % (ref 40–54)
HGB BLD-MCNC: 10.7 G/DL (ref 14–18)
HGB BLD-MCNC: 10.7 G/DL (ref 14–18)
IMM GRANULOCYTES # BLD AUTO: 0.05 K/UL (ref 0–0.04)
IMM GRANULOCYTES # BLD AUTO: 0.05 K/UL (ref 0–0.04)
MCH RBC QN AUTO: 28.2 PG (ref 27–31)
MCH RBC QN AUTO: 28.2 PG (ref 27–31)
MCHC RBC AUTO-ENTMCNC: 32.2 G/DL (ref 32–36)
MCHC RBC AUTO-ENTMCNC: 32.2 G/DL (ref 32–36)
MCV RBC AUTO: 87 FL (ref 82–98)
MCV RBC AUTO: 87 FL (ref 82–98)
NEUTROPHILS # BLD AUTO: 5.7 K/UL (ref 1.8–7.7)
NEUTROPHILS # BLD AUTO: 5.7 K/UL (ref 1.8–7.7)
PLATELET # BLD AUTO: 197 K/UL (ref 150–450)
PLATELET # BLD AUTO: 197 K/UL (ref 150–450)
PMV BLD AUTO: 10.5 FL (ref 9.2–12.9)
PMV BLD AUTO: 10.5 FL (ref 9.2–12.9)
POTASSIUM SERPL-SCNC: 3.6 MMOL/L (ref 3.5–5.1)
PROT SERPL-MCNC: 6.9 G/DL (ref 6–8.4)
RBC # BLD AUTO: 3.8 M/UL (ref 4.6–6.2)
RBC # BLD AUTO: 3.8 M/UL (ref 4.6–6.2)
SODIUM SERPL-SCNC: 141 MMOL/L (ref 136–145)
TSH SERPL DL<=0.005 MIU/L-ACNC: 0.56 UIU/ML (ref 0.4–4)
WBC # BLD AUTO: 7.14 K/UL (ref 3.9–12.7)
WBC # BLD AUTO: 7.14 K/UL (ref 3.9–12.7)

## 2024-08-01 PROCEDURE — 83630 LACTOFERRIN FECAL (QUAL): CPT | Performed by: NURSE PRACTITIONER

## 2024-08-01 PROCEDURE — 82705 FATS/LIPIDS FECES QUAL: CPT | Performed by: NURSE PRACTITIONER

## 2024-08-01 PROCEDURE — 87449 NOS EACH ORGANISM AG IA: CPT | Performed by: NURSE PRACTITIONER

## 2024-08-01 PROCEDURE — 99215 OFFICE O/P EST HI 40 MIN: CPT | Mod: PBBFAC,PO | Performed by: NURSE PRACTITIONER

## 2024-08-01 PROCEDURE — 84443 ASSAY THYROID STIM HORMONE: CPT | Performed by: HOSPITALIST

## 2024-08-01 PROCEDURE — 96360 HYDRATION IV INFUSION INIT: CPT

## 2024-08-01 PROCEDURE — 99214 OFFICE O/P EST MOD 30 MIN: CPT | Mod: S$PBB,,, | Performed by: NURSE PRACTITIONER

## 2024-08-01 PROCEDURE — 87449 NOS EACH ORGANISM AG IA: CPT | Mod: 91 | Performed by: NURSE PRACTITIONER

## 2024-08-01 PROCEDURE — 85027 COMPLETE CBC AUTOMATED: CPT | Performed by: HOSPITALIST

## 2024-08-01 PROCEDURE — 87045 FECES CULTURE AEROBIC BACT: CPT | Performed by: NURSE PRACTITIONER

## 2024-08-01 PROCEDURE — 82533 TOTAL CORTISOL: CPT | Performed by: NURSE PRACTITIONER

## 2024-08-01 PROCEDURE — 82024 ASSAY OF ACTH: CPT | Performed by: NURSE PRACTITIONER

## 2024-08-01 PROCEDURE — 87427 SHIGA-LIKE TOXIN AG IA: CPT | Mod: 59 | Performed by: NURSE PRACTITIONER

## 2024-08-01 PROCEDURE — 99215 OFFICE O/P EST HI 40 MIN: CPT | Mod: PBBFAC,25,27 | Performed by: NURSE PRACTITIONER

## 2024-08-01 PROCEDURE — 80053 COMPREHEN METABOLIC PANEL: CPT | Performed by: HOSPITALIST

## 2024-08-01 PROCEDURE — 87046 STOOL CULTR AEROBIC BACT EA: CPT | Performed by: NURSE PRACTITIONER

## 2024-08-01 PROCEDURE — 87324 CLOSTRIDIUM AG IA: CPT | Performed by: NURSE PRACTITIONER

## 2024-08-01 PROCEDURE — 36415 COLL VENOUS BLD VENIPUNCTURE: CPT | Mod: PO | Performed by: NURSE PRACTITIONER

## 2024-08-01 PROCEDURE — 99999 PR PBB SHADOW E&M-EST. PATIENT-LVL V: CPT | Mod: PBBFAC,,, | Performed by: NURSE PRACTITIONER

## 2024-08-01 PROCEDURE — 82653 EL-1 FECAL QUANTITATIVE: CPT | Performed by: NURSE PRACTITIONER

## 2024-08-01 PROCEDURE — 99215 OFFICE O/P EST HI 40 MIN: CPT | Mod: S$PBB,,, | Performed by: NURSE PRACTITIONER

## 2024-08-01 PROCEDURE — 83993 ASSAY FOR CALPROTECTIN FECAL: CPT | Performed by: NURSE PRACTITIONER

## 2024-08-01 PROCEDURE — G2211 COMPLEX E/M VISIT ADD ON: HCPCS | Mod: S$PBB,,, | Performed by: NURSE PRACTITIONER

## 2024-08-01 PROCEDURE — 25000003 PHARM REV CODE 250: Performed by: NURSE PRACTITIONER

## 2024-08-01 RX ORDER — SODIUM CHLORIDE 0.9 % (FLUSH) 0.9 %
10 SYRINGE (ML) INJECTION
Status: DISCONTINUED | OUTPATIENT
Start: 2024-08-01 | End: 2024-08-01 | Stop reason: HOSPADM

## 2024-08-01 RX ORDER — SODIUM CHLORIDE 0.9 % (FLUSH) 0.9 %
10 SYRINGE (ML) INJECTION
Status: CANCELLED | OUTPATIENT
Start: 2024-08-01

## 2024-08-01 RX ORDER — HEPARIN 100 UNIT/ML
500 SYRINGE INTRAVENOUS
Status: DISCONTINUED | OUTPATIENT
Start: 2024-08-01 | End: 2024-08-01 | Stop reason: HOSPADM

## 2024-08-01 RX ORDER — HEPARIN 100 UNIT/ML
500 SYRINGE INTRAVENOUS
Status: CANCELLED | OUTPATIENT
Start: 2024-08-01

## 2024-08-01 RX ORDER — BUSPIRONE HYDROCHLORIDE 5 MG/1
5 TABLET ORAL 2 TIMES DAILY
Qty: 60 TABLET | Refills: 11 | Status: SHIPPED | OUTPATIENT
Start: 2024-08-01 | End: 2024-08-02 | Stop reason: ALTCHOICE

## 2024-08-01 RX ORDER — MIRTAZAPINE 15 MG/1
15 TABLET, FILM COATED ORAL NIGHTLY
Qty: 30 TABLET | Refills: 11 | Status: SHIPPED | OUTPATIENT
Start: 2024-08-01 | End: 2025-08-01

## 2024-08-01 RX ADMIN — SODIUM CHLORIDE 1000 ML: 9 INJECTION, SOLUTION INTRAVENOUS at 04:08

## 2024-08-01 NOTE — ASSESSMENT & PLAN NOTE
Most likely due to chemotherapy treatments.   Pt would benefit from Megestrol but will leave medication choice to oncology.  Fortunately pt has ppt this PM.

## 2024-08-01 NOTE — Clinical Note
Please schedule a f/u next available. Patient still having issues with hydrating and eating. Not drinking Ensure. Hoping you can help push him in the right direction. Starting him on Remeron to hopefully get his appetite going again.   Thanks

## 2024-08-01 NOTE — PROGRESS NOTES
Subjective     Patient ID: Jordin Allen Jr. is a 78 y.o. male.    Chief Complaint: Follow-up    Pt in office for ongoing management of hypotension. At last visit he was recommended to discontinue Amlodipine and HCTZ but continue with losartan 100 mg daily. Since this change his blood pressures have been consistently in the 110s-120s. He feels a little better and continues to complain of his decreased appetite. He has also been having daily loose stools. His acute care home visits monitored his K yesterday and determines that he was hypokalemic at 3.4. Repletion has already been started. Pt continues with chemotherapy for Adenocarcinoma of SANJAY.     Pt also reports increased anxiety with his diagnosis and treatment plan.     Follow-up    Review of Systems   Psychiatric/Behavioral:  The patient is nervous/anxious.    All other systems reviewed and are negative.     Objective   Physical Exam  Vitals reviewed.   Constitutional:       Appearance: Normal appearance.   HENT:      Head: Normocephalic and atraumatic.      Nose: Nose normal.      Mouth/Throat:      Mouth: Mucous membranes are moist.   Eyes:      Pupils: Pupils are equal, round, and reactive to light.   Cardiovascular:      Rate and Rhythm: Normal rate and regular rhythm.      Heart sounds: Normal heart sounds.   Pulmonary:      Effort: Pulmonary effort is normal.      Breath sounds: Normal breath sounds.   Abdominal:      General: Bowel sounds are normal.      Palpations: Abdomen is soft.   Musculoskeletal:         General: Normal range of motion.      Cervical back: Normal range of motion.   Skin:     General: Skin is warm and dry.   Neurological:      General: No focal deficit present.      Mental Status: He is alert and oriented to person, place, and time.   Psychiatric:         Mood and Affect: Mood normal.         Behavior: Behavior normal.        Assessment and Plan   1. Hypotension, unspecified hypotension type  Assessment & Plan:  Now resolved.    Continue with losartan 100 mg daily but hold if SBP<100.       2. Decreased appetite  Assessment & Plan:  Most likely due to chemotherapy treatments.   Pt would benefit from Megestrol but will leave medication choice to oncology.  Fortunately pt has ppt this PM.       3. Anxiety  Assessment & Plan:  Anxiety associated with CA diagnosis.   Will start pt on low dose buspirone 5 mg BID.   Side effects discussed.   Follow up with PCP as needed.     Orders:  -     busPIRone (BUSPAR) 5 MG Tab; Take 1 tablet (5 mg total) by mouth 2 (two) times daily.  Dispense: 60 tablet; Refill: 11    RTC PRN.

## 2024-08-01 NOTE — PROGRESS NOTES
[facility] MEDICAL ONCOLOGY - FOLLOW UP VISIT    Reason for visit: Adenocarcinoma of the SANJAY      Oncology History   Adenocarcinoma, lung, left   3/19/2024 Imaging Significant Findings    CTA PE (hemoptysis)  - 3.4 x 5.8 cm L hilar mass, probable invasion of distal L main bronchus and SANJAY bronchi     3/19/2024 - 3/22/2024 Hospital Admission    Presented with new onset hemoptysis.  Imaging demonstrated left hilar mass.  Bronchoscopy performed and confirmed adenocarcinoma, EGFR Exon 20 insertion.     3/21/2024 Procedure    Bronch w/ EBUS  SANJAY, Endobronchial Bx  - Adenocarcinoma (CK7 and TTF1 positive; CK20, p40, and CK 5/6 negative)    SANJAY, BAL  - Adenocarcinoma    TEMPUS NGS/PD-L1  - PD-L1 TPS: 35%  - EGFR exon 20 inframe insertion ([id number])  - TP53 misssense ([id number])  - Negative: EGFR, ALK, BRAF, KRAS, ROS1, RET, MET, ERBB2  - TMB 9.5 m/MB     4/3/2024 Imaging Significant Findings    MRI Brain  - 0.8 cm rim enhancing lesion in L thalamus, no significant mass effect      4/11/2024 Imaging Significant Findings    PET CT  - 4.8 x 2.3 cm  L hilar mass, SUV 14  - Adjacent pulmonary nodules, e.g. 1.2 cm  - No intrathoracic LAD     4/12/2024 Cancer Staged    Staging form: Lung, AJCC 8th Edition  - Clinical stage from 4/12/2024: Stage STEF (cT3, cN1, cM1b)     4/30/2024 Procedure    Bronch with EBUS  RUL, EBBx  - Adenocarcinoma (TTF-1 positive, p40 negative)    LN  - Adenocarcinoma    Procedure Note  - Station for L was normal-sized not sampled; no other lymph nodes were visible by EBUS, including station 7, 4R, and 11R     5/8/2024 - 6/18/2024 Radiation Therapy    Treatment Summary  Course: C1 Lung 2024  Treatment Site Energy Dose/Fx (Gy) #Fx Total Dose (Gy) Start Date End Date Elapsed Days   Left hilum/lung 6X 2 30 / 30 60 5/8/2024 6/18/2024 41        5/9/2024 - 6/18/2024 Chemotherapy    Carboplatin/Paclitaxel, Concurrent w/ Radiation Therapy  Weekly  - Carboplatin AUC 2  -  Paclitaxel 45 mg/m2     6/27/2024 Imaging Significant Findings    CT C/A/P  - 3.2 x 3.1 cm left hilar mass, previously 3.7 cm  - Previously seen 1.2 cm nodule adjacent to primary mass not seen on this exam  - Near complete resolution of multiple prior nodular opacities  - Stable RUL micronodule     7/18/2024 -  Chemotherapy    Treatment Summary   Plan Name: OP DURVALUMAB 1500 MG Q4W  Treatment Goal: Curative  Status: Active  Start Date: 7/18/2024  End Date: 6/19/2025 (Planned)  Provider: Isacc Nuñez IV, MD  Chemotherapy: [No matching medication found in this treatment plan]              HPI:     Jordin Allen Jr. is a 78 y.o. male with pmh significant for restless leg syndrome, COPD, CAD s/p stent (2013), HTN, HLD, obesity, GERD, hearing loss requiring hearing aids, and Stage IIIA (T3N1M0) adenocarcinoma of the L hilum (PD-L1 35%, EGFR exon 20 insertion), initially dx'd 3/21/24, completed CRT (5/8/24-6/18/24) with weekly carboplatin/paclitaxel (5/9/24-6/18/24) , who presents for follow-up.     Last clinic 6/28/24, patient bleed CRT in imaging with partial response, plan to proceed with durvalumab consolidation.  Discuss repeat CNS imaging at next visit.    Interval History:  Reports feeling extremely fatigued. Not hydrating well - drinks ~20 oz of liquid per day. Eats very little. Does not like Ensure and not drinking any supplements. Felt better after getting IVFs with acute home care yesterday. Daughter states he has asked for multiple days to be brought to the hospital because he feels so bad. Has no appetite and asking for appetite stimulant. Having diarrhea for about 2 weeks. Never more than 2 loose stools per day.     Presents to clinic in wheelchair with daughter. ECOG is 2.     Past Medical History:   Past Medical History:   Diagnosis Date    Benign neoplasm of colon 10/01/2013    Bronchitis chronic     CAD (coronary artery disease) 10/31/2013    COPD (chronic obstructive pulmonary disease)      Emphysema of lung     GERD (gastroesophageal reflux disease)     Hx of colonic polyps     Hypertension     NAFLD (nonalcoholic fatty liver disease) 2017    SHOLA on CPAP     RLS (restless legs syndrome)     Screen for colon cancer 2013        Past Surgical History:   Past Surgical History:   Procedure Laterality Date    bilateral foot surgery      CARDIAC CATHETERIZATION  2013    x1    CATARACT EXTRACTION, BILATERAL      COLON SURGERY      Ace Mott MD    COLONOSCOPY N/A 3/21/2018    Procedure: COLONOSCOPY;  Surgeon: Terry Mott MD;  Location: [facility] ENDO (4TH FLR);  Service: Endoscopy;  Laterality: N/A;    COLONOSCOPY N/A 2019    Procedure: COLONOSCOPY;  Surgeon: John Mantilla MD;  Location: [facility] ENDO (4TH FLR);  Service: Endoscopy;  Laterality: N/A;    COLONOSCOPY N/A 2022    Procedure: COLONOSCOPY;  Surgeon: MEDINA Farris MD;  Location: [facility] ENDO (4TH FLR);  Service: Endoscopy;  Laterality: N/A;  fully vacc-inst portal-tb    ENDOBRONCHIAL ULTRASOUND Bilateral 2024    Procedure: ENDOBRONCHIAL ULTRASOUND (EBUS);  Surgeon: Naveed Aguero MD;  Location: [facility] OR;  Service: Pulmonary;  Laterality: Bilateral;    scrotal abscess removal          Family History:   Family History   Problem Relation Name Age of Onset    Heart disease Mother      Heart attack Mother      Hypertension Mother      No Known Problems Sister      No Known Problems Daughter 2     Asthma Neg Hx      Emphysema Neg Hx      Prostate cancer Neg Hx      Cirrhosis Neg Hx          Social History:   Social History     Tobacco Use    Smoking status: Former     Current packs/day: 0.00     Average packs/day: 1 pack/day for 40.0 years (40.0 ttl pk-yrs)     Types: Cigarettes     Start date: 8/15/1972     Quit date: 8/15/2012     Years since quittin.9     Passive exposure: Never    Smokeless tobacco: Never   Substance Use Topics    Alcohol use: Yes     Alcohol/week: 3.0 standard drinks of alcohol     Types: 3  Cans of beer per week     Comment: maybe 2-3  beers weekly        I have reviewed and updated the patient's past medical, surgical, family and social histories.      ROS:   As per HPI.     Allergies:   Review of patient's allergies indicates:   Allergen Reactions    Brilinta [ticagrelor] Itching    Lisinopril      Other reaction(s): cough    Metoprolol succinate Rash        Medications:   Current Outpatient Medications   Medication Sig Dispense Refill    albuterol (ACCUNEB) 0.63 mg/3 mL Nebu Take 3 mLs (0.63 mg total) by nebulization every 6 (six) hours as needed (if symptoms failed to improve with inhaler). Rescue 375 mL 11    albuterol (PROVENTIL/VENTOLIN HFA) 90 mcg/actuation inhaler Inhale 2 puffs into the lungs every 4 (four) hours as needed for Wheezing. 8.5 g 11    amitriptyline (ELAVIL) 25 MG tablet Take 1 tablet (25 mg total) by mouth once daily. 90 tablet 1    aspirin (ECOTRIN) 81 MG EC tablet Take 81 mg by mouth once daily.       atorvastatin (LIPITOR) 80 MG tablet TAKE 1 TABLET (80 MG TOTAL) BY MOUTH ONCE DAILY. (Patient taking differently: every evening. TAKE 1 TABLET (80 MG TOTAL) BY MOUTH ONCE DAILY.) 90 tablet 3    BETA-CAROTENE,A, W-C & E/MIN (OCUVITE ORAL) Take 1 capsule by mouth once daily.       budesonide-glycopyr-formoterol (BREZTRI AEROSPHERE) 160-9-4.8 mcg/actuation HFAA Inhale 2 puffs into the lungs 2 (two) times a day. 10.7 g 3    busPIRone (BUSPAR) 5 MG Tab Take 1 tablet (5 mg total) by mouth 2 (two) times daily. 60 tablet 11    echinacea 400 mg Cap Take 1 capsule by mouth once daily.       fluticasone propionate (FLONASE) 50 mcg/actuation nasal spray 2 sprays (100 mcg total) by Each Nostril route once daily. 16 g 11    latanoprost 0.005 % ophthalmic solution Place 1 drop into both eyes once daily.       latanoprost 0.005 % ophthalmic solution INSTILL 1 DROP INTO BOTH EYES AT BEDTIME 7.5 mL 3    losartan (COZAAR) 100 MG tablet Take 1 tablet (100 mg total) by mouth once daily. 90 tablet 2  "   nitroGLYCERIN (NITROSTAT) 0.4 MG SL tablet Place 1 tablet (0.4 mg total) under the tongue every 5 (five) minutes as needed. 100 tablet 3    omeprazole (PRILOSEC) 20 MG capsule Take 1 capsule (20 mg total) by mouth once daily. 90 capsule 3    ondansetron (ZOFRAN-ODT) 8 MG TbDL Place 1 tablet every 8 hours by translingual route as needed. 30 tablet 0    roflumilast (DALIRESP) 500 mcg Tab Take 1 tablet (500 mcg total) by mouth once daily. 90 tablet 3    mirtazapine (REMERON) 15 MG tablet Take 1 tablet (15 mg total) by mouth every evening. 30 tablet 11     No current facility-administered medications for this visit.     Facility-Administered Medications Ordered in Other Visits   Medication Dose Route Frequency Provider Last Rate Last Admin    dextrose 50% injection 12.5 g  12.5 g Intravenous PRN Lidia Deleon MD        dextrose 50% injection 25 g  25 g Intravenous PRN Lidia Deleon MD        insulin regular injection 0-8 Units  0-8 Units Intravenous Continuous PRN Lidia Deleon MD              Physical Exam:       /62 (BP Location: Right arm, Patient Position: Sitting, BP Method: Large (Automatic))   Pulse 90   Temp 98 °F (36.7 °C) (Oral)   Resp 18   Ht 5' 8" (1.727 m)   Wt 94.3 kg (207 lb 14.3 oz)   SpO2 96%   BMI 31.61 kg/m²                Physical Exam  Constitutional:       Appearance: Normal appearance. He is obese.      Comments: Sitting in wheelchair, appears fatigued    HENT:      Head: Normocephalic and atraumatic.   Eyes:      Extraocular Movements: Extraocular movements intact.      Conjunctiva/sclera: Conjunctivae normal.      Pupils: Pupils are equal, round, and reactive to light.   Cardiovascular:      Rate and Rhythm: Normal rate and regular rhythm.      Heart sounds: No murmur heard.     No friction rub. No gallop.   Pulmonary:      Effort: Pulmonary effort is normal.      Breath sounds: No wheezing, rhonchi or rales.      Comments: Wearing supplemental O2  Musculoskeletal: "         General: Normal range of motion.      Right lower leg: No edema.      Left lower leg: No edema.   Skin:     General: Skin is warm and dry.      Comments: Bruising over bilateral dorsa of hands   Neurological:      Mental Status: He is alert and oriented to person, place, and time.   Psychiatric:         Mood and Affect: Mood normal.         Thought Content: Thought content normal.         Judgment: Judgment normal.           Labs:   Recent Results (from the past 48 hour(s))   CBC Oncology    Collection Time: 08/01/24  8:31 AM   Result Value Ref Range    WBC 7.14 3.90 - 12.70 K/uL    RBC 3.80 (L) 4.60 - 6.20 M/uL    Hemoglobin 10.7 (L) 14.0 - 18.0 g/dL    Hematocrit 33.2 (L) 40.0 - 54.0 %    MCV 87 82 - 98 fL    MCH 28.2 27.0 - 31.0 pg    MCHC 32.2 32.0 - 36.0 g/dL    RDW 16.2 (H) 11.5 - 14.5 %    Platelets 197 150 - 450 K/uL    MPV 10.5 9.2 - 12.9 fL    Gran # (ANC) 5.7 1.8 - 7.7 K/uL    Immature Grans (Abs) 0.05 (H) 0.00 - 0.04 K/uL   CBC Oncology    Collection Time: 08/01/24  8:31 AM   Result Value Ref Range    WBC 7.14 3.90 - 12.70 K/uL    RBC 3.80 (L) 4.60 - 6.20 M/uL    Hemoglobin 10.7 (L) 14.0 - 18.0 g/dL    Hematocrit 33.2 (L) 40.0 - 54.0 %    MCV 87 82 - 98 fL    MCH 28.2 27.0 - 31.0 pg    MCHC 32.2 32.0 - 36.0 g/dL    RDW 16.2 (H) 11.5 - 14.5 %    Platelets 197 150 - 450 K/uL    MPV 10.5 9.2 - 12.9 fL    Gran # (ANC) 5.7 1.8 - 7.7 K/uL    Immature Grans (Abs) 0.05 (H) 0.00 - 0.04 K/uL   TSH    Collection Time: 08/01/24  8:31 AM   Result Value Ref Range    TSH 0.555 0.400 - 4.000 uIU/mL   Cortisol, 8AM    Collection Time: 08/01/24  8:31 AM   Result Value Ref Range    Cortisol, 8 AM 20.50 4.30 - 22.40 ug/dL   Comprehensive Metabolic Panel    Collection Time: 08/01/24  8:31 AM   Result Value Ref Range    Sodium 141 136 - 145 mmol/L    Potassium 3.6 3.5 - 5.1 mmol/L    Chloride 104 95 - 110 mmol/L    CO2 25 23 - 29 mmol/L    Glucose 97 70 - 110 mg/dL    BUN 12 8 - 23 mg/dL    Creatinine 1.4 0.5 - 1.4  mg/dL    Calcium 9.3 8.7 - 10.5 mg/dL    Total Protein 6.9 6.0 - 8.4 g/dL    Albumin 3.1 (L) 3.5 - 5.2 g/dL    Total Bilirubin 1.3 (H) 0.1 - 1.0 mg/dL    Alkaline Phosphatase 93 55 - 135 U/L    AST 30 10 - 40 U/L    ALT 28 10 - 44 U/L    eGFR 52.1 (A) >60 mL/min/1.73 m^2    Anion Gap 12 8 - 16 mmol/L              Imaging:    See oncologic history above.     Path:  See oncologic history above.      Assessment and Plan:     Jordin Allen Jr. is a 78 y.o. male with pmh significant for restless leg syndrome, COPD, CAD s/p stent (2013), HTN, HLD, obesity, GERD, hearing loss requiring hearing aids, and Stage IIIA (T3N1M0) adenocarcinoma of the L hilum (PD-L1 35%, EGFR exon 20 insertion), initially dx'd 3/21/24, completed CRT on 6/18/24 who presents for follow-up.     Stage IIIA Adenocarcinoma of L Hilum, EGFR Exon 20 insertion, PD-L1 35%  ECOG PS 1 (limited by MONTIEL).  Patient presents today in advance of C1D1 durvalumab consolidation.  Since last visit, he has developed significant fatigue, decreased appetite, and dizziness (see below), favor likely secondary to CRT.  Also endorses shortness of breath in addition to fatigue (see below).  Most recent imaging (CT C/A/P, 6/27/24) demonstrated appropriate treatment effect with decrement in the left hilar mass (3.2 cm from 2.7 cm) as well as resolution of multiple prior nodular opacities.  After long conversation with the patient (see below), will proceed with C1D1 durvalumab today (7/18/24).  Plan for a total 1 year of durvalumab consolidation with imaging every 3 months throughout.    PLAN:   -- S/p C1D1 durvalumab (7/18/24)  -- PET CT scheduled for 9/18/24, which is 3 months from prior      Dyspnea/ Fatigue  Still has not recovered energy and strength since completing chemoRT. Breathing and SOB is stable. Using supplemental O2 PRN. Unlikely r/t 1 cycle of Durvalumab. TSH and AM cortisol WNL.      FTT/Decreased Appetite  Likely secondary to recent CRT. Patient still not  adequately eating and hydrating. Requiring multiple visits for IVFs. Again Discussed multiple small meals throughout the day and focusing on caloric dense food.  Also discussed importance of drinking fluids throughout the day.   -- Message sent to dietician for sooner f/u.   -- Starting Remeron      Dehydration  Patient would significantly decreased p.o. intake as above. Discussed importance of drinking fluids throughout the day along with electrolyte supplements that can used to improve hydration.   -- 1 L NS today and patient will discuss with Acute Care Home services about receiving more over the weekend. Currently no concern for potential fluid overload.       Hyperbilirubinemia  T bili 1.3 today. This has been intermittently elevated to approximately the same range over the past multiple lab visits.  Imaging without evidence of metastatic disease to the liver.  Given intermittent elevation in past, may represent Gilbert's.     Rim Enhancing Thalamic Lesion  7/26/24 MRI  shows increased size of peripherally enhancing cystic lesion in the left thalamus, now measuring 2.2 cm.   -- Scheduled for GKS tomorrow 8/2/24.     Diarrhea   -- Stool sample collected.     45 minutes total time spent with patient, 30 minutes were spent face to face with the patient and his family to discuss the disease, natural history, treatment options and survival statistics. Greater than 50% of this time was for counseling. 15 minutes of chart review and coordination of care. I have provided the patient with an opportunity to ask questions and have all questions answered to his satisfaction.       Patient is in agreement with the proposed treatment plan. All questions were answered to the patient's satisfaction. Pt knows to call clinic if anything is needed before the next clinic visit.    Zabrina Lemus, MSN, APRN, FNP-C  Hematology and Medical Oncology  Nurse Practitioner to Dr. Vivek Resendiz  Nurse Practitioner, Center for [facility]  Cancer Therapies        Route Chart for Scheduling    Med Onc Chart Routing      Follow up with physician . RTC as scheduled   Follow up with JARAD    Infusion scheduling note    Injection scheduling note    Labs    Imaging    Pharmacy appointment    Other referrals

## 2024-08-01 NOTE — ASSESSMENT & PLAN NOTE
Anxiety associated with CA diagnosis.   Will start pt on low dose buspirone 5 mg BID.   Side effects discussed.   Follow up with PCP as needed.

## 2024-08-01 NOTE — PLAN OF CARE
171 Patient tolerated 1L NS without incident. Seen by Zabrina Lemus NP in clinic today. Vitals stable before and after infusion, patient on 2L NC O2.  PIV flushed without resistance and positive for blood return before, during and after infusion. To contact provider with questions or concerns. D/C stable in wheelchair escorted by his daughter.

## 2024-08-01 NOTE — TELEPHONE ENCOUNTER
Spoke to Marilou - pt does want to come today - appt confirmed for 330 PM - lab results will be discussed during visit

## 2024-08-02 ENCOUNTER — TELEPHONE (OUTPATIENT)
Dept: HEMATOLOGY/ONCOLOGY | Facility: CLINIC | Age: 79
End: 2024-08-02
Payer: MEDICARE

## 2024-08-02 ENCOUNTER — PROCEDURE VISIT (OUTPATIENT)
Dept: RADIATION THERAPY | Facility: HOSPITAL | Age: 79
End: 2024-08-02
Attending: STUDENT IN AN ORGANIZED HEALTH CARE EDUCATION/TRAINING PROGRAM
Payer: MEDICARE

## 2024-08-02 DIAGNOSIS — C79.31 SECONDARY MALIGNANT NEOPLASM OF BRAIN: Primary | ICD-10-CM

## 2024-08-02 LAB
ACTH PLAS-MCNC: 20 PG/ML (ref 0–46)
BACTERIA STL CULT: ABNORMAL
E COLI SXT1 STL QL IA: NEGATIVE
E COLI SXT2 STL QL IA: NEGATIVE

## 2024-08-02 RX ORDER — ONDANSETRON 4 MG/1
4 TABLET, FILM COATED ORAL ONCE
Status: DISCONTINUED | OUTPATIENT
Start: 2024-08-02 | End: 2024-08-02

## 2024-08-02 NOTE — PROCEDURES
DATE:  08/02/2024  TIME TX STARTED:  TIME TX COMPLETED:  Jrodin Allen Jr.  was positioned on the table in a custom-shaped immobilization mask.  Cone-beam CT was performed for stereotactic reference, and the images were imported into the Gamma Knife planning station. Images were co-registered with pre-planning MRI, which was used to define the target volume(s) and organs at risk.   Under direct physician supervision, acceptable localization of target and normal tissue was achieved with image guidance.    We targeted the lesion(s) with the dose(s) prescribed above to the margin of (each) lesion.    Treatment was carried out without difficulty using automated positioning.  Upon completion of the Gamma Knife procedure, the immobilization mask was removed.

## 2024-08-02 NOTE — PROCEDURES
GAMMA KNIFE TREATMENT SUMMARY     NAME OF PATIENT:              Jordin Allen     DATE:                                     8/2/2024     Neurosurgeon:                      Steven Campos M.D., Ph.D.     Radiation Oncologist:           Bienvenido Henson M.D.     DIAGNOSIS:                             Lung cancer.  Brain metastases.                                                                                             Operative Procedure:    Planning of gamma radiosurgery.  Delivery of gamma radiosurgery.       Indications in Detail:    Mr. Jordin Allen Jr. is a 78 y.o. gentleman with h/o HTN, SHOLA, COPD, adenocarcinoma of the lungs  who recently presented to the ED with a 3 day history of hypotension and dizziness. After stabilizing the pt's blood pressure, the pt was worked up in the ER and MRI imaging revealed a cystic lesion measuring 2.2cm in the left thalamus with peripheral enhancement.  The pt reports that he is not feeling well. He had lost his appetite and experienced nausea.  His nausea is relieved by Zofran. He states that although his nausea has improved, he still maintains little appetite. MRI BRAIN W WO CONTRAST 7/29/24:  Increased size of peripherally enhancing cystic lesion in the left thalamus, now measuring 2.2 cm.  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  No new lesion.  Only mild edema without midline shift. I have recommended SRS gamma knife (focused radiation). I have discussed the risks/benefits, indications, and alternatives for the proposed procedure in detail. I have answered all of their questions and patient wish to proceed with radiosurgery.     Procedure in Detail:  The patient was seen in the pretreatment area and the risks, benefits, and alternatives were discussed. The patient understood and wished to proceed. Dr. Campos, Dr. Henson and Dr. Johnson planned the radiosurgery based on a recent MRI that had been performed.  A single target  was identified:  Left thalamic tumor.  The tumor was targeted using Gamma Plan (Lightning planned).  The treatment consisted of 30 shots using various collimators.  Dr. Henson prescribed a dose of 27 Gy to the 51% isodose line to be delivered the total of 3 fractions.  The patient had a mask that was made in used to immobilize him during the treatment.  The patient was immobilized using this mask.  The cone beam CT and plan were coregistered.  The patient was placed in the unit and the shots were delivered sequentially.  The patient was placed in the unit and the shots were delivered sequentially.  The total beam on-time was 20 point this minutes.  The patient tolerated the treatment well.  He will return for his remaining 2 fractions.     COMPLICATIONS:  None.  ESTIMATED BLOOD LOSS:  None.  DISPOSITION:  The patient is to followup in the Multidisciplinary CNS Tumor Clinic clinic.           Dr. Steven Campos MD, Ph.D.

## 2024-08-02 NOTE — PROCEDURES
Called daughter Marilou with an update on pt status and how much time remaining for treatment.  She verbalized an understanding and is out in the lobby waiting.

## 2024-08-02 NOTE — PATIENT INSTRUCTIONS
After Gamma Knife Treatment    You may return to your normal activities as you feel up to doing them usually 1-2 days after treatment.    Do not drive the day of your treatment.    Feeling tired is normal after therapy: Rest as needed, eat healthy and drink plenty fluids     Some patients get headaches from wearing the mask:  you can take Tylenol or Advil when you need to     When should I call the doctor?    Call your doctor right away if you have any of the following:   Seizures   A persistent severe headache: may be caused by swelling in the treated area of your brain. We often treat with a medication called Decadron to reduce the swelling    Vision changes    Nausea and/or vomiting    Numbness in your face, arms and/or legs   Weakness  Loss of balance       What follow-up care will I have?       The results of the Gamma Knife treatment do not happen right away.   They may show up over a period of time. You will have scans (CT, MRI   or angiography) done again to measure the success of the treatment.     The first scan is normally done about 2- 3 months after the treatment.   How often these scans will be done depends on your diagnosis.     It is very important to keep your follow up appointments with the   radiation oncologist and neurosurgeon. You will be given information   with the date, time and location for these appointments.  Who do I call if I have questions?     If you have questions about your Gamma Knife treatment :     [facility] Radiation Oncology Department- Gamma Knife Suite at [phone].     To reach the neurosurgeon in the evenings or weekends, call the   hospital  at [phone] and ask for the neurosurgeon on   call.

## 2024-08-02 NOTE — PROCEDURES
Patient: Jordin Allen Jr.    MRN: [id number]    : 1945    DATE OF PROCEDURE: 2024      PRE-OPERATIVE DIAGNOSIS:  Brain metastasis       POST-OPERATIVE DIAGNOSIS:  Same         PROCEDURE PERFORMED:                 1)         Gamma Knife stereotactic radiosurgery to brain metastasis.       RADIATION ONCOLOGIST: Bienvenido Henson      NEUROSURGEON:  TIMUR Neurosurgeon: Steven Campos       MEDICAL PHYSICIST: TIMUR Medical Physicist: Colton Johnson       PROCEDURE SUMMARY:    Target # Site Dose per Fraction (Gy) Cumulative Dose % Isodose Line Fraction # Total Fractions   1 Lt Thalamic 9 9 51 1 3       INDICATIONS AND CONSENT:  Jordin Allen Jr. is a 78 y.o. male with Stage IV NSCLC (cT3 cN1 M1b) adeno diagnosed on EBUS 24 s/p definitive chemo-RT to 60 Gy in 30 fx completed 24 with Dr. Deng. Staging MRI Brain 4/3/24 demonstrated a 0.8 cm Left thalamic metastasis; this was observed. MRI Brain 24 demonstrated interval increase in size to 2.2 cm; no other evidence of intracranial disease. It was recommended that he undergo Gamma Knife stereotactic radiosurgery.  The risks of this procedure as well as possible complications were discussed with the patient pre-operatively.       DESCRIPTION OF PROCEDURE:  On the day of the procedure, the patient was positioned on the table in a custom-shaped immobilization mask.  Cone-beam CT was performed for stereotactic reference, and the images were imported into the Gamma Knife planning station. Images were co-registered with pre-planning MRI, which was used to define the target volume(s) and organs at risk. Under direct physician supervision, acceptable localization of target and normal tissue was achieved with image guidance.  We targeted the lesion(s) with the dose(s) prescribed above to the margin of (each) lesion.  Treatment was carried out without difficulty using automated positioning.  Upon completion of the Gamma Knife procedure, the immobilization mask was  removed.

## 2024-08-02 NOTE — TELEPHONE ENCOUNTER
Spoke w/ pt in regards to scheduling nutrition appt. Pt approved to schedule in person visit with Dave Lux RD for 8/9 @ 11AM.       [place], [place] ext [phone]

## 2024-08-04 ENCOUNTER — PATIENT MESSAGE (OUTPATIENT)
Dept: ADMINISTRATIVE | Facility: OTHER | Age: 79
End: 2024-08-04
Payer: MEDICARE

## 2024-08-05 ENCOUNTER — PROCEDURE VISIT (OUTPATIENT)
Dept: RADIATION THERAPY | Facility: HOSPITAL | Age: 79
End: 2024-08-05
Attending: STUDENT IN AN ORGANIZED HEALTH CARE EDUCATION/TRAINING PROGRAM
Payer: MEDICARE

## 2024-08-05 ENCOUNTER — PATIENT MESSAGE (OUTPATIENT)
Dept: ADMINISTRATIVE | Facility: OTHER | Age: 79
End: 2024-08-05
Payer: MEDICARE

## 2024-08-05 DIAGNOSIS — C79.31 SECONDARY MALIGNANT NEOPLASM OF BRAIN: Primary | ICD-10-CM

## 2024-08-06 ENCOUNTER — TELEPHONE (OUTPATIENT)
Dept: HEMATOLOGY/ONCOLOGY | Facility: CLINIC | Age: 79
End: 2024-08-06
Payer: MEDICARE

## 2024-08-06 ENCOUNTER — PATIENT MESSAGE (OUTPATIENT)
Dept: HEMATOLOGY/ONCOLOGY | Facility: CLINIC | Age: 79
End: 2024-08-06
Payer: MEDICARE

## 2024-08-06 ENCOUNTER — PATIENT MESSAGE (OUTPATIENT)
Dept: ADMINISTRATIVE | Facility: OTHER | Age: 79
End: 2024-08-06
Payer: MEDICARE

## 2024-08-06 DIAGNOSIS — R19.7 DIARRHEA, UNSPECIFIED TYPE: Primary | ICD-10-CM

## 2024-08-07 ENCOUNTER — PATIENT MESSAGE (OUTPATIENT)
Dept: ADMINISTRATIVE | Facility: OTHER | Age: 79
End: 2024-08-07
Payer: MEDICARE

## 2024-08-07 ENCOUNTER — PROCEDURE VISIT (OUTPATIENT)
Dept: RADIATION THERAPY | Facility: HOSPITAL | Age: 79
End: 2024-08-07
Attending: STUDENT IN AN ORGANIZED HEALTH CARE EDUCATION/TRAINING PROGRAM
Payer: MEDICARE

## 2024-08-07 ENCOUNTER — PATIENT MESSAGE (OUTPATIENT)
Dept: NEUROSURGERY | Facility: CLINIC | Age: 79
End: 2024-08-07
Payer: MEDICARE

## 2024-08-07 ENCOUNTER — E-CONSULT (OUTPATIENT)
Dept: GASTROENTEROLOGY | Facility: CLINIC | Age: 79
End: 2024-08-07
Payer: MEDICARE

## 2024-08-07 DIAGNOSIS — C79.31 SECONDARY MALIGNANT NEOPLASM OF BRAIN: Primary | ICD-10-CM

## 2024-08-07 DIAGNOSIS — A04.5 CAMPYLOBACTER DIARRHEA: Primary | ICD-10-CM

## 2024-08-07 RX ORDER — AZITHROMYCIN 500 MG/1
500 TABLET, FILM COATED ORAL DAILY
Qty: 14 TABLET | Refills: 0 | Status: SHIPPED | OUTPATIENT
Start: 2024-08-07 | End: 2024-08-21

## 2024-08-08 ENCOUNTER — PATIENT MESSAGE (OUTPATIENT)
Dept: ADMINISTRATIVE | Facility: OTHER | Age: 79
End: 2024-08-08
Payer: MEDICARE

## 2024-08-09 ENCOUNTER — PATIENT MESSAGE (OUTPATIENT)
Dept: ADMINISTRATIVE | Facility: OTHER | Age: 79
End: 2024-08-09
Payer: MEDICARE

## 2024-08-09 ENCOUNTER — TELEPHONE (OUTPATIENT)
Dept: HEMATOLOGY/ONCOLOGY | Facility: CLINIC | Age: 79
End: 2024-08-09
Payer: MEDICARE

## 2024-08-09 ENCOUNTER — PATIENT MESSAGE (OUTPATIENT)
Dept: HEMATOLOGY/ONCOLOGY | Facility: CLINIC | Age: 79
End: 2024-08-09
Payer: MEDICARE

## 2024-08-09 ENCOUNTER — TELEPHONE (OUTPATIENT)
Dept: HEMATOLOGY/ONCOLOGY | Facility: CLINIC | Age: 79
End: 2024-08-09

## 2024-08-10 ENCOUNTER — PATIENT MESSAGE (OUTPATIENT)
Dept: ADMINISTRATIVE | Facility: OTHER | Age: 79
End: 2024-08-10
Payer: MEDICARE

## 2024-08-11 ENCOUNTER — PATIENT MESSAGE (OUTPATIENT)
Dept: ADMINISTRATIVE | Facility: OTHER | Age: 79
End: 2024-08-11
Payer: MEDICARE

## 2024-08-12 ENCOUNTER — PATIENT MESSAGE (OUTPATIENT)
Dept: ADMINISTRATIVE | Facility: OTHER | Age: 79
End: 2024-08-12
Payer: MEDICARE

## 2024-08-13 ENCOUNTER — TELEPHONE (OUTPATIENT)
Dept: HEMATOLOGY/ONCOLOGY | Facility: CLINIC | Age: 79
End: 2024-08-13
Payer: MEDICARE

## 2024-08-13 ENCOUNTER — PATIENT MESSAGE (OUTPATIENT)
Dept: ADMINISTRATIVE | Facility: OTHER | Age: 79
End: 2024-08-13
Payer: MEDICARE

## 2024-08-13 ENCOUNTER — PATIENT MESSAGE (OUTPATIENT)
Dept: HEMATOLOGY/ONCOLOGY | Facility: CLINIC | Age: 79
End: 2024-08-13
Payer: MEDICARE

## 2024-08-13 NOTE — TELEPHONE ENCOUNTER
Attempted to reach pt in regards to r/s no show nutrition appt w/ Dave Lux RD. Voice mailbox is full. Unable to leave message.      [place], [place] ext [phone]

## 2024-08-14 ENCOUNTER — PATIENT MESSAGE (OUTPATIENT)
Dept: ADMINISTRATIVE | Facility: OTHER | Age: 79
End: 2024-08-14
Payer: MEDICARE

## 2024-08-15 ENCOUNTER — LAB VISIT (OUTPATIENT)
Dept: LAB | Facility: HOSPITAL | Age: 79
End: 2024-08-15
Attending: HOSPITALIST
Payer: MEDICARE

## 2024-08-15 ENCOUNTER — OFFICE VISIT (OUTPATIENT)
Dept: HEMATOLOGY/ONCOLOGY | Facility: CLINIC | Age: 79
End: 2024-08-15
Payer: MEDICARE

## 2024-08-15 ENCOUNTER — PATIENT MESSAGE (OUTPATIENT)
Dept: ADMINISTRATIVE | Facility: OTHER | Age: 79
End: 2024-08-15
Payer: MEDICARE

## 2024-08-15 ENCOUNTER — PATIENT MESSAGE (OUTPATIENT)
Dept: HEMATOLOGY/ONCOLOGY | Facility: CLINIC | Age: 79
End: 2024-08-15

## 2024-08-15 VITALS
BODY MASS INDEX: 30.14 KG/M2 | HEIGHT: 68 IN | OXYGEN SATURATION: 98 % | RESPIRATION RATE: 16 BRPM | HEART RATE: 78 BPM | SYSTOLIC BLOOD PRESSURE: 123 MMHG | WEIGHT: 198.88 LBS | DIASTOLIC BLOOD PRESSURE: 81 MMHG | TEMPERATURE: 98 F

## 2024-08-15 DIAGNOSIS — C79.31 SECONDARY MALIGNANT NEOPLASM OF BRAIN: ICD-10-CM

## 2024-08-15 DIAGNOSIS — R63.0 DECREASED APPETITE: ICD-10-CM
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
## 2024-08-15 DIAGNOSIS — D63.0 ANEMIA IN NEOPLASTIC DISEASE: ICD-10-CM

## 2024-08-15 DIAGNOSIS — R53.83 FATIGUE DUE TO RADIATION THERAPY: ICD-10-CM

## 2024-08-15 DIAGNOSIS — C79.9 METASTATIC NEOPLASM: ICD-10-CM

## 2024-08-15 DIAGNOSIS — C77.1 SECONDARY MALIGNANT NEOPLASM OF INTRATHORACIC LYMPH NODES: ICD-10-CM

## 2024-08-15 DIAGNOSIS — D53.9 ANEMIA ASSOCIATED WITH NUTRITIONAL DEFICIENCY: ICD-10-CM

## 2024-08-15 DIAGNOSIS — R11.0 NAUSEA: ICD-10-CM

## 2024-08-15 DIAGNOSIS — C34.12 ADENOCARCINOMA OF UPPER LOBE OF LEFT LUNG: ICD-10-CM

## 2024-08-15 DIAGNOSIS — C34.12 ADENOCARCINOMA OF UPPER LOBE OF LEFT LUNG: Primary | ICD-10-CM

## 2024-08-15 DIAGNOSIS — A04.5 CAMPYLOBACTER DIARRHEA: ICD-10-CM

## 2024-08-15 LAB
ALBUMIN SERPL BCP-MCNC: 3 G/DL (ref 3.5–5.2)
ALP SERPL-CCNC: 96 U/L (ref 55–135)
ALT SERPL W/O P-5'-P-CCNC: 23 U/L (ref 10–44)
ANION GAP SERPL CALC-SCNC: 13 MMOL/L (ref 8–16)
AST SERPL-CCNC: 22 U/L (ref 10–40)
BILIRUB SERPL-MCNC: 0.9 MG/DL (ref 0.1–1)
BUN SERPL-MCNC: 11 MG/DL (ref 8–23)
CALCIUM SERPL-MCNC: 9.7 MG/DL (ref 8.7–10.5)
CHLORIDE SERPL-SCNC: 107 MMOL/L (ref 95–110)
CO2 SERPL-SCNC: 26 MMOL/L (ref 23–29)
CREAT SERPL-MCNC: 1.1 MG/DL (ref 0.5–1.4)
ERYTHROCYTE [DISTWIDTH] IN BLOOD BY AUTOMATED COUNT: 14.9 % (ref 11.5–14.5)
EST. GFR  (NO RACE VARIABLE): >60 ML/MIN/1.73 M^2
GLUCOSE SERPL-MCNC: 105 MG/DL (ref 70–110)
HCT VFR BLD AUTO: 32.8 % (ref 40–54)
HGB BLD-MCNC: 9.9 G/DL (ref 14–18)
IMM GRANULOCYTES # BLD AUTO: 0.05 K/UL (ref 0–0.04)
MCH RBC QN AUTO: 27 PG (ref 27–31)
MCHC RBC AUTO-ENTMCNC: 30.2 G/DL (ref 32–36)
MCV RBC AUTO: 89 FL (ref 82–98)
NEUTROPHILS # BLD AUTO: 5.8 K/UL (ref 1.8–7.7)
PLATELET # BLD AUTO: 167 K/UL (ref 150–450)
PMV BLD AUTO: 9.7 FL (ref 9.2–12.9)
POTASSIUM SERPL-SCNC: 3.7 MMOL/L (ref 3.5–5.1)
PROT SERPL-MCNC: 6.8 G/DL (ref 6–8.4)
RBC # BLD AUTO: 3.67 M/UL (ref 4.6–6.2)
SODIUM SERPL-SCNC: 146 MMOL/L (ref 136–145)
T4 FREE SERPL-MCNC: 1.18 NG/DL (ref 0.71–1.51)
TSH SERPL DL<=0.005 MIU/L-ACNC: 0.59 UIU/ML (ref 0.4–4)
WBC # BLD AUTO: 7.25 K/UL (ref 3.9–12.7)

## 2024-08-15 PROCEDURE — 84439 ASSAY OF FREE THYROXINE: CPT | Performed by: HOSPITALIST

## 2024-08-15 PROCEDURE — 99999 PR PBB SHADOW E&M-EST. PATIENT-LVL V: CPT | Mod: PBBFAC,,, | Performed by: HOSPITALIST

## 2024-08-15 PROCEDURE — 80053 COMPREHEN METABOLIC PANEL: CPT | Performed by: HOSPITALIST

## 2024-08-15 PROCEDURE — 85027 COMPLETE CBC AUTOMATED: CPT | Performed by: HOSPITALIST

## 2024-08-15 PROCEDURE — 36415 COLL VENOUS BLD VENIPUNCTURE: CPT | Performed by: HOSPITALIST

## 2024-08-15 PROCEDURE — 99215 OFFICE O/P EST HI 40 MIN: CPT | Mod: PBBFAC | Performed by: HOSPITALIST

## 2024-08-15 PROCEDURE — 99215 OFFICE O/P EST HI 40 MIN: CPT | Mod: S$PBB,,, | Performed by: HOSPITALIST

## 2024-08-15 PROCEDURE — G2211 COMPLEX E/M VISIT ADD ON: HCPCS | Mod: S$PBB,,, | Performed by: HOSPITALIST

## 2024-08-15 PROCEDURE — 84443 ASSAY THYROID STIM HORMONE: CPT | Performed by: HOSPITALIST

## 2024-08-15 RX ORDER — DRONABINOL 5 MG/1
5 CAPSULE ORAL
Qty: 60 CAPSULE | Refills: 0 | Status: SHIPPED | OUTPATIENT
Start: 2024-08-15 | End: 2024-08-16

## 2024-08-15 RX ORDER — ONDANSETRON 8 MG/1
TABLET, ORALLY DISINTEGRATING ORAL
Qty: 30 TABLET | Refills: 0 | Status: SHIPPED | OUTPATIENT
Start: 2024-08-15

## 2024-08-15 NOTE — PROGRESS NOTES
[facility] MEDICAL ONCOLOGY - FOLLOW UP VISIT    Reason for visit: Adenocarcinoma of the SANJAY      Oncology History   Adenocarcinoma, lung, left   3/19/2024 Imaging Significant Findings    CTA PE (hemoptysis)  - 3.4 x 5.8 cm L hilar mass, probable invasion of distal L main bronchus and SANJAY bronchi     3/19/2024 - 3/22/2024 Hospital Admission    Presented with new onset hemoptysis.  Imaging demonstrated left hilar mass.  Bronchoscopy performed and confirmed adenocarcinoma, EGFR Exon 20 insertion.     3/21/2024 Procedure    Bronch w/ EBUS  SANJAY, Endobronchial Bx  - Adenocarcinoma (CK7 and TTF1 positive; CK20, p40, and CK 5/6 negative)    SANJAY, BAL  - Adenocarcinoma    TEMPUS NGS/PD-L1  - PD-L1 TPS: 35%  - EGFR exon 20 inframe insertion ([id number])  - TP53 misssense ([id number])  - Negative: EGFR, ALK, BRAF, KRAS, ROS1, RET, MET, ERBB2  - TMB 9.5 m/MB     4/3/2024 Imaging Significant Findings    MRI Brain  - 0.8 cm rim enhancing lesion in L thalamus, no significant mass effect      4/11/2024 Imaging Significant Findings    PET CT  - 4.8 x 2.3 cm  L hilar mass, SUV 14  - Adjacent pulmonary nodules, e.g. 1.2 cm  - No intrathoracic LAD     4/12/2024 Cancer Staged    Staging form: Lung, AJCC 8th Edition  - Clinical stage from 4/12/2024: Stage STEF (cT3, cN1, cM1b)     4/30/2024 Procedure    Bronch with EBUS  RUL, EBBx  - Adenocarcinoma (TTF-1 positive, p40 negative)    LN  - Adenocarcinoma    Procedure Note  - Station for L was normal-sized not sampled; no other lymph nodes were visible by EBUS, including station 7, 4R, and 11R     5/8/2024 - 6/18/2024 Radiation Therapy    Treatment Summary  Course: C1 Lung 2024  Treatment Site Energy Dose/Fx (Gy) #Fx Total Dose (Gy) Start Date End Date Elapsed Days   Left hilum/lung 6X 2 30 / 30 60 5/8/2024 6/18/2024 41        5/9/2024 - 6/18/2024 Chemotherapy    Carboplatin/Paclitaxel, Concurrent w/ Radiation Therapy  Weekly  - Carboplatin AUC 2  -  Paclitaxel 45 mg/m2     6/27/2024 Imaging Significant Findings    CT C/A/P  - 3.2 x 3.1 cm left hilar mass, previously 3.7 cm  - Previously seen 1.2 cm nodule adjacent to primary mass not seen on this exam  - Near complete resolution of multiple prior nodular opacities  - Stable RUL micronodule     7/18/2024 -  Chemotherapy    Treatment Summary   Plan Name: OP DURVALUMAB 1500 MG Q4W  Treatment Goal: Curative  Status: Active  Start Date: 7/18/2024  End Date: 6/19/2025 (Planned)  Provider: Isacc Nuñez IV, MD  Chemotherapy: [No matching medication found in this treatment plan]     7/26/2024 Imaging Significant Findings    MRI Brain  - 2.2 cm peripherally enhancing cystic lesion of left thalamus, previously 0.8 cm.  Smaller surrounding edema signal, localized mass effect with slight effacement of the adjacent lateral ventricle, no hydrocephalus or midline shift.     8/2/2024 - 8/7/2024 Radiation Therapy    Treating physician: Bienvenido Henson  GK SRT  Target # Site Dose per Fraction (Gy) Cumulative Dose % Isodose Line Fraction # Total Fractions   1 Lt Thalamic 9 27 51 3 3             Oncology History    HPI:     Jordin Allen Jr. is a 78 y.o. 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     Last clinic 8/1/24 (Zabrina Lemus, urgent clinic visit), patient continues to be fatigued, not eating or drinking adequately and requiring multiple visits for IV fluids.  Starting Remeron.  Scheduled for gamma knife on 8/2/24.  Stool sample collected for  "diarrhea.    Interval History:  - 8/2/24-8/7/24: Gammaknife to brain  - 8/7/24:  GI eConsult, 2 weeks diarrhea, stool studies positive for Campylobacter and low fecal elastase and mildly elevated calprotectin level.  Treat Campylobacter with the azithromycin 500 mg daily for 14 days.  - 8/7/24: Azithromycin sent, end date on 8/21/24    The pt states that his diarrhea is "okay". He only had 1 BM yesterday and the day, which were normally formed. He feels his last episode of diarrhea was 3-4 days. He confirms that he is taking the azithromycin, and confirms his understanding to continue this until 8/21/24.      Regarding his appetite, he is concerned that it remains diminished. He feels that it has not improved since our last visit, excepting a hamburger on Tuesday night. He feels that his appetite is better in the morning. He weighs 198.86 lbs today (8/15/24), from 207.9 on 8/1, from 210 on 7/23. He confirms that he has been on the mirtazapine; he denies any improvement in symptoms since starting. He says that he has not met with the dietitian, stating that it was too hot the day of his appointment. He feels that he is drinking a lot of water. He says that gatorade doesn't taste good to him. He says that he has not recently been getting IV fluids.     Regarding his nausea, he says that he takes zofran most days, and sometimes takes them twice day. He says they significantly improve his nausea. His nausea is worsened with eating.     He feels that his fatigue remains considerable. He says that he doesn't want to do anything, noting that the outdoors heat doesn't help. He feels this has not changed since our last visit. He says that he doesn't have desire to do much, and says that he just wants to rest.     ROS:   As per HPI.     Physical Exam:       /81 (BP Location: Left arm, Patient Position: Sitting, BP Method: Medium (Automatic))   Pulse 78   Temp 97.8 °F (36.6 °C) (Oral)   Resp 16   Ht 5' 8" (1.727 m)  "  Wt 90.2 kg (198 lb 13.7 oz)   SpO2 98%   BMI 30.24 kg/m²                Physical Exam  Constitutional:       Appearance: Normal appearance. He is obese.      Comments: Sitting in wheelchair, appears fatigued   HENT:      Head: Normocephalic and atraumatic.   Eyes:      Extraocular Movements: Extraocular movements intact.      Conjunctiva/sclera: Conjunctivae normal.      Pupils: Pupils are equal, round, and reactive to light.   Cardiovascular:      Rate and Rhythm: Normal rate and regular rhythm.      Heart sounds: No murmur heard.     No friction rub. No gallop.   Pulmonary:      Effort: Pulmonary effort is normal.      Breath sounds: No wheezing, rhonchi or rales.   Musculoskeletal:         General: Normal range of motion.      Right lower leg: No edema.      Left lower leg: No edema.   Skin:     General: Skin is warm and dry.   Neurological:      Mental Status: He is alert and oriented to person, place, and time.   Psychiatric:         Mood and Affect: Mood normal.         Thought Content: Thought content normal.         Judgment: Judgment normal.           Labs:   Recent Results (from the past 48 hour(s))   Comprehensive Metabolic Panel    Collection Time: 08/15/24  8:32 AM   Result Value Ref Range    Sodium 146 (H) 136 - 145 mmol/L    Potassium 3.7 3.5 - 5.1 mmol/L    Chloride 107 95 - 110 mmol/L    CO2 26 23 - 29 mmol/L    Glucose 105 70 - 110 mg/dL    BUN 11 8 - 23 mg/dL    Creatinine 1.1 0.5 - 1.4 mg/dL    Calcium 9.7 8.7 - 10.5 mg/dL    Total Protein 6.8 6.0 - 8.4 g/dL    Albumin 3.0 (L) 3.5 - 5.2 g/dL    Total Bilirubin 0.9 0.1 - 1.0 mg/dL    Alkaline Phosphatase 96 55 - 135 U/L    AST 22 10 - 40 U/L    ALT 23 10 - 44 U/L    eGFR >60.0 >60 mL/min/1.73 m^2    Anion Gap 13 8 - 16 mmol/L   CBC Oncology    Collection Time: 08/15/24  8:32 AM   Result Value Ref Range    WBC 7.25 3.90 - 12.70 K/uL    RBC 3.67 (L) 4.60 - 6.20 M/uL    Hemoglobin 9.9 (L) 14.0 - 18.0 g/dL    Hematocrit 32.8 (L) 40.0 - 54.0 %     MCV 89 82 - 98 fL    MCH 27.0 27.0 - 31.0 pg    MCHC 30.2 (L) 32.0 - 36.0 g/dL    RDW 14.9 (H) 11.5 - 14.5 %    Platelets 167 150 - 450 K/uL    MPV 9.7 9.2 - 12.9 fL    Gran # (ANC) 5.8 1.8 - 7.7 K/uL    Immature Grans (Abs) 0.05 (H) 0.00 - 0.04 K/uL   TSH    Collection Time: 08/15/24  8:32 AM   Result Value Ref Range    TSH 0.595 0.400 - 4.000 uIU/mL   T4, Free    Collection Time: 08/15/24  8:32 AM   Result Value Ref Range    Free T4 1.18 0.71 - 1.51 ng/dL                Imaging:    See oncologic history above.     Path:  See oncologic history above.      Assessment and Plan:     Jordin Allen Jr. is a 78 y.o. 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     Stage IIIA Adenocarcinoma of L Hilum, EGFR Exon 20 insertion, PD-L1 35%  ECOG PS 2-3 (limited by fatigue, weakness, nausea). The patient is currently on durvalumab consolidation, with multiple ongoing symptoms likely largely unrelated (decreased appetite, nausea, fatigue, diarrhea; see below). Most recent imaging (CT C/A/P, 6/27/24) demonstrated appropriate treatment effect with decrement in the left hilar mass (3.2 cm from 2.7 cm) as well as resolution of multiple prior nodular opacities. An MRI brain (7/26/24) unfortunately demonstrated enlargement of the previously seen thalamic mass (see below), for which pt has subsequently received GK.  We discussed that the risk of extrathoracic disease is increased in the setting of this brain lesion, but that scans otherwise do not demonstrate  metastatic disease and so will treat as oligometastatic disease on the previously discussed treatment plan with imaging throughout. In the setting of the patient's multiple active issues, will delay C2 durvalumab by at least 2 weeks (not an absolute contraindication, however PS has worsened so will attempt to manage other symptoms first).     PLAN:   -- Delay durvalumab C2 by 2 weeks, labs and RTC prior to infusion  -- PET CT scheduled for 9/18/24, which is 3 months from prior      Brain Met  0.8 cm rim enhancing lesion in L thalamus on initial imaging. On initial review with radiology, low likelihood of malignancy and instead more likely cystic lesion, so pt treated w/ curative intent therapy for intrathoracic disease with plan for surveillance. Unfortunately, repeat MRI brain on 7/26/24 demonstrated growth to 2.2 cm. Pt now s/p GK to this solitary lesion.   -- MRI brain on 10/7/24  -- NSGY and rad onc f/u on 10/8/24      Decreased Appetite  Nausea  Pt endorses a significantly decreased appetite. He has lost 9 lbs over ~3 weeks, and is about 20 lbs down from his pre-treatment weight. Likely multifactorial, including recent CRT, GK to CNS met, and diarrheal illness. Currently on mirtazapine to minimal effect. Declines f/u with onc nutrition, stating that he has heard the recommendations before. Will trail dronabinol instead, with low threshold for short course of steroids (following completion of azithromycin).   -- Start dronabinol 5 mg BID  -- Stop mirtazapine  -- Refill of ondansetron sent  -- Low threshold for short course of steroids (likely dex 2 mg BID x 5 days)  -- Palliative care referral in place      Diarrhea  Likely 2/2 campylobacter (see GI e-consult from 8/7/24), currently improving on azithromycin (1 normal BM past two days).   -- Continue azithromycin 500 mg daily for 14 days total, EOT on 8/21/24      Fatigue  Likely multifactorial, including completion of CRT, GK to CNS met, diarrheal illness, and  decreased PO intake.  -- Treating contributory symptoms as above  -- Low threshold for short course of steroids (likely dex 2 mg BID x 5 days)  -- Palliative care referral in place      Anemia  Slowly downtrending hemoglobin (9.9, from 10.7, from 11.9).  MCV is 89.  Patient denies any obvious bleeding.  Did recently have diarrhea.  -- Iron studies, folate, B12, and CBC in 2 weeks w/ RTC      Hypokalemia  Potassium 3.7 today (8/15/24).   -- CTM      The above information has been reviewed with the patient and all questions have been answered to their apparent satisfaction.  They understand that they can call the clinic with any questions.    Isacc Nuñez MD  Hematology/Oncology  [facility]      Route Chart for Scheduling    Med Onc Chart Routing      Follow up with physician . Labs, RTC, and C2 durvalumab in 2 weeks.   Follow up with JARAD    Infusion scheduling note    Injection scheduling note    Labs CBC, CMP, iron and TIBC, vitamin B12, folate and ferritin   Scheduling:  Preferred lab:  Lab interval:     Imaging    Pharmacy appointment    Other referrals                      Disclaimer: This note was prepared using voice recognition system and is likely to have sound alike errors and is not proof read.  Please call me with any questions.

## 2024-08-16 ENCOUNTER — PATIENT MESSAGE (OUTPATIENT)
Dept: ADMINISTRATIVE | Facility: OTHER | Age: 79
End: 2024-08-16
Payer: MEDICARE

## 2024-08-16 ENCOUNTER — TELEPHONE (OUTPATIENT)
Dept: HEMATOLOGY/ONCOLOGY | Facility: CLINIC | Age: 79
End: 2024-08-16
Payer: MEDICARE

## 2024-08-16 DIAGNOSIS — R11.0 NAUSEA: ICD-10-CM

## 2024-08-16 DIAGNOSIS — R63.0 DECREASED APPETITE: ICD-10-CM

## 2024-08-16 RX ORDER — DRONABINOL 5 MG/1
5 CAPSULE ORAL
Qty: 60 CAPSULE | Refills: 0 | Status: SHIPPED | OUTPATIENT
Start: 2024-08-16

## 2024-08-16 NOTE — TELEPHONE ENCOUNTER
I spoke to patient. He said there seems to be a problem with his marinol. They said they didn't have it. I told him I received a message saying it was approved. I asked him if he wanted us to send it somewhere else. He said no, he would call them back.

## 2024-08-16 NOTE — TELEPHONE ENCOUNTER
"----- Message from Popeye Boucher sent at 8/16/2024  4:44 PM CDT -----  Consult/Advisory    Name Of Caller: Self    Contact Preference?: [phone]     Provider Name: Savannah    Does patient feel the need to be seen today? No    What is the nature of the call?: Calling to discuss issues receiving droNABinol (MARINOL) 5 MG capsule refill    Additional Notes:  "Thank you for all that you do for our patients"  "

## 2024-08-17 ENCOUNTER — PATIENT MESSAGE (OUTPATIENT)
Dept: ADMINISTRATIVE | Facility: OTHER | Age: 79
End: 2024-08-17
Payer: MEDICARE

## 2024-08-19 ENCOUNTER — PATIENT MESSAGE (OUTPATIENT)
Dept: ADMINISTRATIVE | Facility: OTHER | Age: 79
End: 2024-08-19
Payer: MEDICARE

## 2024-08-20 ENCOUNTER — PATIENT MESSAGE (OUTPATIENT)
Dept: ADMINISTRATIVE | Facility: OTHER | Age: 79
End: 2024-08-20
Payer: MEDICARE

## 2024-08-21 ENCOUNTER — PATIENT MESSAGE (OUTPATIENT)
Dept: ADMINISTRATIVE | Facility: OTHER | Age: 79
End: 2024-08-21
Payer: MEDICARE

## 2024-08-21 DIAGNOSIS — R63.0 DECREASED APPETITE: ICD-10-CM

## 2024-08-21 RX ORDER — MIRTAZAPINE 15 MG/1
15 TABLET, FILM COATED ORAL NIGHTLY
Qty: 30 TABLET | Refills: 11 | OUTPATIENT
Start: 2024-08-21 | End: 2025-08-21

## 2024-08-22 ENCOUNTER — PATIENT MESSAGE (OUTPATIENT)
Dept: ADMINISTRATIVE | Facility: OTHER | Age: 79
End: 2024-08-22
Payer: MEDICARE

## 2024-08-22 ENCOUNTER — PATIENT MESSAGE (OUTPATIENT)
Dept: PALLIATIVE MEDICINE | Facility: CLINIC | Age: 79
End: 2024-08-22
Payer: MEDICARE

## 2024-08-22 DIAGNOSIS — R63.0 DECREASED APPETITE: Primary | ICD-10-CM

## 2024-08-22 RX ORDER — CYPROHEPTADINE HYDROCHLORIDE 2 MG/5ML
SOLUTION ORAL
Qty: 473 ML | Refills: 12 | Status: SHIPPED | OUTPATIENT
Start: 2024-08-22 | End: 2024-09-19

## 2024-08-23 ENCOUNTER — PATIENT MESSAGE (OUTPATIENT)
Dept: ADMINISTRATIVE | Facility: OTHER | Age: 79
End: 2024-08-23
Payer: MEDICARE

## 2024-08-24 ENCOUNTER — PATIENT MESSAGE (OUTPATIENT)
Dept: ADMINISTRATIVE | Facility: OTHER | Age: 79
End: 2024-08-24
Payer: MEDICARE

## 2024-08-26 ENCOUNTER — PATIENT MESSAGE (OUTPATIENT)
Dept: ADMINISTRATIVE | Facility: OTHER | Age: 79
End: 2024-08-26
Payer: MEDICARE

## 2024-08-27 ENCOUNTER — PATIENT MESSAGE (OUTPATIENT)
Dept: ADMINISTRATIVE | Facility: OTHER | Age: 79
End: 2024-08-27
Payer: MEDICARE

## 2024-08-28 ENCOUNTER — PATIENT MESSAGE (OUTPATIENT)
Dept: ADMINISTRATIVE | Facility: OTHER | Age: 79
End: 2024-08-28
Payer: MEDICARE

## 2024-08-29 ENCOUNTER — INFUSION (OUTPATIENT)
Dept: INFUSION THERAPY | Facility: HOSPITAL | Age: 79
End: 2024-08-29
Attending: HOSPITALIST
Payer: MEDICARE

## 2024-08-29 ENCOUNTER — OFFICE VISIT (OUTPATIENT)
Dept: HEMATOLOGY/ONCOLOGY | Facility: CLINIC | Age: 79
End: 2024-08-29
Payer: MEDICARE

## 2024-08-29 VITALS
TEMPERATURE: 98 F | RESPIRATION RATE: 16 BRPM | SYSTOLIC BLOOD PRESSURE: 120 MMHG | HEIGHT: 69 IN | HEART RATE: 77 BPM | WEIGHT: 193.56 LBS | BODY MASS INDEX: 28.67 KG/M2 | DIASTOLIC BLOOD PRESSURE: 68 MMHG

## 2024-08-29 VITALS — HEIGHT: 69 IN | BODY MASS INDEX: 29.37 KG/M2

## 2024-08-29 DIAGNOSIS — R53.83 FATIGUE DUE TO RADIATION THERAPY: ICD-10-CM

## 2024-08-29 DIAGNOSIS — R06.00 DYSPNEA, UNSPECIFIED TYPE: ICD-10-CM

## 2024-08-29 DIAGNOSIS — C79.31 SECONDARY MALIGNANT NEOPLASM OF BRAIN: ICD-10-CM

## 2024-08-29 DIAGNOSIS — C77.1 SECONDARY MALIGNANT NEOPLASM OF INTRATHORACIC LYMPH NODES: ICD-10-CM

## 2024-08-29 DIAGNOSIS — C34.92 ADENOCARCINOMA, LUNG, LEFT: Primary | ICD-10-CM

## 2024-08-29 DIAGNOSIS — R11.0 NAUSEA: ICD-10-CM
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
## 2024-08-29 DIAGNOSIS — C34.12 ADENOCARCINOMA OF UPPER LOBE OF LEFT LUNG: Primary | ICD-10-CM

## 2024-08-29 DIAGNOSIS — R63.0 DECREASED APPETITE: ICD-10-CM

## 2024-08-29 PROCEDURE — 99213 OFFICE O/P EST LOW 20 MIN: CPT | Mod: PBBFAC | Performed by: HOSPITALIST

## 2024-08-29 PROCEDURE — 25000003 PHARM REV CODE 250: Performed by: HOSPITALIST

## 2024-08-29 PROCEDURE — G2211 COMPLEX E/M VISIT ADD ON: HCPCS | Mod: S$PBB,,, | Performed by: HOSPITALIST

## 2024-08-29 PROCEDURE — 96413 CHEMO IV INFUSION 1 HR: CPT

## 2024-08-29 PROCEDURE — 99215 OFFICE O/P EST HI 40 MIN: CPT | Mod: S$PBB,,, | Performed by: HOSPITALIST

## 2024-08-29 PROCEDURE — 99999 PR PBB SHADOW E&M-EST. PATIENT-LVL III: CPT | Mod: PBBFAC,,, | Performed by: HOSPITALIST

## 2024-08-29 PROCEDURE — 63600175 PHARM REV CODE 636 W HCPCS: Mod: JZ,JG | Performed by: HOSPITALIST

## 2024-08-29 RX ORDER — HEPARIN 100 UNIT/ML
500 SYRINGE INTRAVENOUS
Status: DISCONTINUED | OUTPATIENT
Start: 2024-08-29 | End: 2024-08-29 | Stop reason: HOSPADM

## 2024-08-29 RX ORDER — DIPHENHYDRAMINE HYDROCHLORIDE 50 MG/ML
50 INJECTION INTRAMUSCULAR; INTRAVENOUS ONCE AS NEEDED
Status: DISCONTINUED | OUTPATIENT
Start: 2024-08-29 | End: 2024-08-29 | Stop reason: HOSPADM

## 2024-08-29 RX ORDER — ONDANSETRON 4 MG/1
4 TABLET, FILM COATED ORAL EVERY 8 HOURS PRN
Qty: 30 TABLET | Refills: 1 | Status: SHIPPED | OUTPATIENT
Start: 2024-08-29 | End: 2025-08-29

## 2024-08-29 RX ORDER — HEPARIN 100 UNIT/ML
500 SYRINGE INTRAVENOUS
Status: CANCELLED | OUTPATIENT
Start: 2024-08-29

## 2024-08-29 RX ORDER — DIPHENHYDRAMINE HYDROCHLORIDE 50 MG/ML
50 INJECTION INTRAMUSCULAR; INTRAVENOUS ONCE AS NEEDED
Status: CANCELLED | OUTPATIENT
Start: 2024-08-29

## 2024-08-29 RX ORDER — EPINEPHRINE 0.3 MG/.3ML
0.3 INJECTION SUBCUTANEOUS ONCE AS NEEDED
Status: CANCELLED | OUTPATIENT
Start: 2024-08-29

## 2024-08-29 RX ORDER — SODIUM CHLORIDE 0.9 % (FLUSH) 0.9 %
10 SYRINGE (ML) INJECTION
Status: DISCONTINUED | OUTPATIENT
Start: 2024-08-29 | End: 2024-08-29 | Stop reason: HOSPADM

## 2024-08-29 RX ORDER — EPINEPHRINE 0.3 MG/.3ML
0.3 INJECTION SUBCUTANEOUS ONCE AS NEEDED
Status: DISCONTINUED | OUTPATIENT
Start: 2024-08-29 | End: 2024-08-29 | Stop reason: HOSPADM

## 2024-08-29 RX ORDER — SODIUM CHLORIDE 0.9 % (FLUSH) 0.9 %
10 SYRINGE (ML) INJECTION
Status: CANCELLED | OUTPATIENT
Start: 2024-08-29

## 2024-08-29 RX ADMIN — SODIUM CHLORIDE 1500 MG: 9 INJECTION, SOLUTION INTRAVENOUS at 10:08

## 2024-08-29 RX ADMIN — SODIUM CHLORIDE: 9 INJECTION, SOLUTION INTRAVENOUS at 10:08

## 2024-08-29 RX ADMIN — SODIUM CHLORIDE 1000 ML: 9 INJECTION, SOLUTION INTRAVENOUS at 10:08

## 2024-08-29 NOTE — PROGRESS NOTES
[facility] MEDICAL ONCOLOGY - FOLLOW UP VISIT    Reason for visit: Adenocarcinoma of the SANJAY      Oncology History   Adenocarcinoma, lung, left   3/19/2024 Imaging Significant Findings    CTA PE (hemoptysis)  - 3.4 x 5.8 cm L hilar mass, probable invasion of distal L main bronchus and SANJAY bronchi     3/19/2024 - 3/22/2024 Hospital Admission    Presented with new onset hemoptysis.  Imaging demonstrated left hilar mass.  Bronchoscopy performed and confirmed adenocarcinoma, EGFR Exon 20 insertion.     3/21/2024 Procedure    Bronch w/ EBUS  SANJAY, Endobronchial Bx  - Adenocarcinoma (CK7 and TTF1 positive; CK20, p40, and CK 5/6 negative)    SANJAY, BAL  - Adenocarcinoma    TEMPUS NGS/PD-L1  - PD-L1 TPS: 35%  - EGFR exon 20 inframe insertion ([id number])  - TP53 misssense ([id number])  - Negative: EGFR, ALK, BRAF, KRAS, ROS1, RET, MET, ERBB2  - TMB 9.5 m/MB     4/3/2024 Imaging Significant Findings    MRI Brain  - 0.8 cm rim enhancing lesion in L thalamus, no significant mass effect      4/11/2024 Imaging Significant Findings    PET CT  - 4.8 x 2.3 cm  L hilar mass, SUV 14  - Adjacent pulmonary nodules, e.g. 1.2 cm  - No intrathoracic LAD     4/12/2024 Cancer Staged    Staging form: Lung, AJCC 8th Edition  - Clinical stage from 4/12/2024: Stage STEF (cT3, cN1, cM1b)     4/30/2024 Procedure    Bronch with EBUS  RUL, EBBx  - Adenocarcinoma (TTF-1 positive, p40 negative)    LN  - Adenocarcinoma    Procedure Note  - Station for L was normal-sized not sampled; no other lymph nodes were visible by EBUS, including station 7, 4R, and 11R     5/8/2024 - 6/18/2024 Radiation Therapy    Treatment Summary  Course: C1 Lung 2024  Treatment Site Energy Dose/Fx (Gy) #Fx Total Dose (Gy) Start Date End Date Elapsed Days   Left hilum/lung 6X 2 30 / 30 60 5/8/2024 6/18/2024 41        5/9/2024 - 6/18/2024 Chemotherapy    Carboplatin/Paclitaxel, Concurrent w/ Radiation Therapy  Weekly  - Carboplatin AUC 2  -  Paclitaxel 45 mg/m2     6/27/2024 Imaging Significant Findings    CT C/A/P  - 3.2 x 3.1 cm left hilar mass, previously 3.7 cm  - Previously seen 1.2 cm nodule adjacent to primary mass not seen on this exam  - Near complete resolution of multiple prior nodular opacities  - Stable RUL micronodule     7/18/2024 -  Chemotherapy    Treatment Summary   Plan Name: OP DURVALUMAB 1500 MG Q4W  Treatment Goal: Curative  Status: Active  Start Date: 7/18/2024  End Date: 6/19/2025 (Planned)  Provider: Isacc Nuñez IV, MD  Chemotherapy: [No matching medication found in this treatment plan]     7/26/2024 Imaging Significant Findings    MRI Brain  - 2.2 cm peripherally enhancing cystic lesion of left thalamus, previously 0.8 cm.  Smaller surrounding edema signal, localized mass effect with slight effacement of the adjacent lateral ventricle, no hydrocephalus or midline shift.     8/2/2024 - 8/7/2024 Radiation Therapy    Treating physician: Bienvenido Henson  GK SRT  Target # Site Dose per Fraction (Gy) Cumulative Dose % Isodose Line Fraction # Total Fractions   1 Lt Thalamic 9 27 51 3 3             Oncology History    HPI:     Jordin Allen Jr. is a 78 y.o. 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     Last clinic 8/15/24, diarrhea improved and taking azithromycin for Campylobacter.  Appetite remains diminished the patient continues to lose weight.  Ongoing mild nausea, though improved.  Fatigue remains considerable.  Delay cycle 2 durvalumab with the patient's  "multiple ongoing symptoms, start dronabinol and start mirtazapine, finish course of azithromycin on 8/21/24.  Iron studies with RTC due to downtrending hemoglobin.    Interval History:  The pt states that he is "doing okay" today. He says there has been a "slight improvement" since our last visit. He remains concerned about his appetite. He confirms that he started the periactin on 8/24/24, and says that he can't yet discern a difference in his appetite. He says that he has been trying to eat, but says that it is still difficult. The pt states that he was not weighed today, as he did not feel like getting up on the scale.     He says that his energy is "not really that good". He says that he can sometimes walk around in the house, but says that sometimes he does need his O2. He feels that he first started using energy in July. He has been intermittently using it since, and says that he has been using it about the same amount as previously. He says that he will sit in order to shave, because of this. He confirms that he only uses this for comfort, and does not check his oxygen, though says his O2 has never been <92%. He does use a CPAP at night.     His diarrhea is resolved, and he confirms that he finished his azithromycin. His last BM was ~2 days ago.     He notes an episode last night of dry heaves. He did not take an anti-emetic. He had slight nausea this morning as well, and again did not take zofran.     ROS:   As per HPI.     Physical Exam:       There were no vitals taken for this visit.               Physical Exam  Constitutional:       Appearance: Normal appearance.      Comments: Sitting in wheelchair, appears fatigued   HENT:      Head: Normocephalic and atraumatic.   Eyes:      Extraocular Movements: Extraocular movements intact.      Conjunctiva/sclera: Conjunctivae normal.      Pupils: Pupils are equal, round, and reactive to light.   Cardiovascular:      Rate and Rhythm: Normal rate and regular " rhythm.      Heart sounds: No murmur heard.     No friction rub. No gallop.   Pulmonary:      Effort: Pulmonary effort is normal.      Breath sounds: No wheezing, rhonchi or rales.   Musculoskeletal:         General: Normal range of motion.      Right lower leg: No edema.      Left lower leg: No edema.   Skin:     General: Skin is warm and dry.   Neurological:      Mental Status: He is alert and oriented to person, place, and time.   Psychiatric:         Mood and Affect: Mood normal.         Thought Content: Thought content normal.         Judgment: Judgment normal.           Labs:   No results found for this or any previous visit (from the past 48 hour(s)).               Imaging:    See oncologic history above.     Path:  See oncologic history above.      Assessment and Plan:     Jordin Allen Jr. is a 78 y.o. 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     Stage IIIA Adenocarcinoma of L Hilum, EGFR Exon 20 insertion, PD-L1 35%  ECOG PS 2-3 (limited by fatigue, weakness, nausea). The patient is currently on durvalumab consolidation, with multiple ongoing symptoms which are likely multifactorial (decreased appetite, nausea, fatigue, diarrhea; see below). Most recent imaging (CT C/A/P, 6/27/24) demonstrated appropriate treatment effect with decrement in the left hilar mass (3.2 cm from 2.7 cm) as well as resolution of multiple prior nodular opacities. An MRI brain (7/26/24) unfortunately demonstrated enlargement of the previously seen thalamic  mass (see below), for which pt has subsequently received GK.  We discussed that the risk of extrathoracic disease is increased in the setting of this brain lesion, but that scans otherwise do not demonstrate metastatic disease and so will treat as oligometastatic disease on the previously discussed treatment plan with imaging throughout. In the setting of the patient's multiple active issues, C2 durvalumab was delayed by 2 weeks (not an absolute contraindication, however PS has worsened so will attempt to manage other symptoms first); given stable to improved symptoms since that time, will resume treatment today (8/29/24) with overarching plan for 1 year of consolidation immunotherapy. We discussed indications to message / present to the ED, including worsening respiratory status as below.      PLAN:   -- Proceed with durvalumab C2 today (8/29/24), labs acceptable (Cr relatively stable at 1.3, sodium elevated at 148, will give 1L IVF w/ treatment) and treatment signed   -- PET CT scheduled for 9/18/24, which is 3 months from prior. Pt has rad onc f/u on 9/24/24  -- Labs, RTC, and C3 durvalumab on 9/26/24      Brain Met  0.8 cm rim enhancing lesion in L thalamus on initial imaging. On initial review with radiology, low likelihood of malignancy and instead more likely cystic lesion, so pt treated w/ curative intent therapy for intrathoracic disease with plan for surveillance. Unfortunately, repeat MRI brain on 7/26/24 demonstrated growth to 2.2 cm. Pt now s/p GK to this solitary lesion, continuing to treat as oligometastatic disease as above.   -- MRI brain on 10/7/24  -- NSGY and rad onc f/u on 10/8/24      SOB  See HPI for description.  Patient continues to use intermittent O2 during the day for comfort and uses O2 overnight as well as a CPAP.  He does not take his oxygen sat frequently, however says it has never been below 92%.  He initially passed one walk test, but was ultimately given oxygen after barely failing  a second.  Discussed to use pulse ox any time patient feels the need to use his O2, and if his O2 is less than 92%, to reach out.  Discussed indications to present to the emergency department as well.  Given stable/improving symptoms, unlikely related to radiation pneumonitis however will continue to monitor closely with low threshold for sooner imaging than PET-CT scheduled on 9/18/24.      Decreased Appetite  Nausea  Pt endorses a significantly decreased appetite. He has lost 9 lbs over ~3 weeks, and is about 20 lbs down from his pre-treatment weight. Likely multifactorial, including recent CRT, GK to CNS met, and diarrheal illness. Currently on mirtazapine to minimal effect. Declines f/u with onc nutrition, stating that he has heard the recommendations before. Attempted dronabinol, however unable to procure this due to shortage and so instead started on Periactin. Low threshold for short course of steroids. Nausea of unclear etiology, perhaps related to CNS disease though less likely s/p treatment.   -- Pt weight pending today  -- Continue periactin  -- Pt will continue to inquire regarding availability of dronabinol  -- Refill of ondansetron sent, transitioned to pill form due to taste of dissolving tabs  -- Low threshold for short course of steroids (likely dex 2 mg BID x 5 days)  -- Palliative care referral in place, request follow up      Diarrhea  Likely 2/2 campylobacter (see GI e-consult from 8/7/24), now improved s/p course of azithromycin  -- CTM      Fatigue  Likely multifactorial, including completion of CRT, GK to CNS met, diarrheal illness, and decreased PO intake.  -- Treating contributory symptoms as above  -- Low threshold for short course of steroids (likely dex 2 mg BID x 5 days)  -- Palliative care referral in place      The above information has been reviewed with the patient and all questions have been answered to their apparent satisfaction.  They understand that they can call the clinic  with any questions.    Isacc Nuñez MD  Hematology/Oncology  [facility]      Route Chart for Scheduling    Med Onc Chart Routing      Follow up with physician . Labs, RTC, and C3 durvalumab on 9/26/24. Can we request a soon palliative care appointment? Referral palced 2 weeks ago.   Follow up with JARAD    Infusion scheduling note    Injection scheduling note    Labs    Imaging    Pharmacy appointment    Other referrals                      Disclaimer: This note was prepared using voice recognition system and is likely to have sound alike errors and is not proof read.  Please call me with any questions.

## 2024-08-30 ENCOUNTER — PATIENT MESSAGE (OUTPATIENT)
Dept: ADMINISTRATIVE | Facility: OTHER | Age: 79
End: 2024-08-30
Payer: MEDICARE

## 2024-08-31 ENCOUNTER — PATIENT MESSAGE (OUTPATIENT)
Dept: ADMINISTRATIVE | Facility: OTHER | Age: 79
End: 2024-08-31
Payer: MEDICARE

## 2024-08-31 ENCOUNTER — EXTERNAL CHRONIC CARE MANAGEMENT (OUTPATIENT)
Dept: PRIMARY CARE CLINIC | Facility: CLINIC | Age: 79
End: 2024-08-31
Payer: MEDICARE

## 2024-08-31 PROCEDURE — 99439 CHRNC CARE MGMT STAF EA ADDL: CPT | Mod: S$PBB,,, | Performed by: INTERNAL MEDICINE

## 2024-08-31 PROCEDURE — 99490 CHRNC CARE MGMT STAFF 1ST 20: CPT | Mod: S$PBB,,, | Performed by: INTERNAL MEDICINE

## 2024-08-31 PROCEDURE — 99490 CHRNC CARE MGMT STAFF 1ST 20: CPT | Mod: PBBFAC,PO | Performed by: INTERNAL MEDICINE

## 2024-08-31 PROCEDURE — 99439 CHRNC CARE MGMT STAF EA ADDL: CPT | Mod: PBBFAC,PO | Performed by: INTERNAL MEDICINE

## 2024-09-01 ENCOUNTER — PATIENT MESSAGE (OUTPATIENT)
Dept: ADMINISTRATIVE | Facility: OTHER | Age: 79
End: 2024-09-01
Payer: MEDICARE

## 2024-09-02 ENCOUNTER — PATIENT MESSAGE (OUTPATIENT)
Dept: ADMINISTRATIVE | Facility: OTHER | Age: 79
End: 2024-09-02
Payer: MEDICARE

## 2024-09-02 NOTE — TELEPHONE ENCOUNTER
No care due was identified.  [facility] Embedded Care Due Messages. Reference number: [id number].   9/02/2024 8:33:29 AM CDT

## 2024-09-03 ENCOUNTER — PATIENT MESSAGE (OUTPATIENT)
Dept: ADMINISTRATIVE | Facility: OTHER | Age: 79
End: 2024-09-03
Payer: MEDICARE

## 2024-09-03 RX ORDER — AMITRIPTYLINE HYDROCHLORIDE 25 MG/1
25 TABLET, FILM COATED ORAL DAILY
Qty: 90 TABLET | Refills: 3 | Status: SHIPPED | OUTPATIENT
Start: 2024-09-03

## 2024-09-04 ENCOUNTER — PATIENT MESSAGE (OUTPATIENT)
Dept: ADMINISTRATIVE | Facility: OTHER | Age: 79
End: 2024-09-04
Payer: MEDICARE

## 2024-09-05 ENCOUNTER — PATIENT MESSAGE (OUTPATIENT)
Dept: ADMINISTRATIVE | Facility: OTHER | Age: 79
End: 2024-09-05
Payer: MEDICARE

## 2024-09-06 ENCOUNTER — DOCUMENTATION ONLY (OUTPATIENT)
Dept: HEMATOLOGY/ONCOLOGY | Facility: CLINIC | Age: 79
End: 2024-09-06
Payer: MEDICARE

## 2024-09-06 ENCOUNTER — PATIENT MESSAGE (OUTPATIENT)
Dept: ADMINISTRATIVE | Facility: OTHER | Age: 79
End: 2024-09-06
Payer: MEDICARE

## 2024-09-06 NOTE — PROGRESS NOTES
Iron studies most consistent with chronic inflammation. Hgb 10.6. Will hold on treating at this time, anticipating further sequestration of iatrogenic iron.

## 2024-09-07 ENCOUNTER — PATIENT MESSAGE (OUTPATIENT)
Dept: ADMINISTRATIVE | Facility: OTHER | Age: 79
End: 2024-09-07
Payer: MEDICARE

## 2024-09-08 ENCOUNTER — PATIENT MESSAGE (OUTPATIENT)
Dept: ADMINISTRATIVE | Facility: OTHER | Age: 79
End: 2024-09-08
Payer: MEDICARE

## 2024-09-09 ENCOUNTER — PATIENT MESSAGE (OUTPATIENT)
Dept: ADMINISTRATIVE | Facility: OTHER | Age: 79
End: 2024-09-09
Payer: MEDICARE

## 2024-09-10 ENCOUNTER — PATIENT MESSAGE (OUTPATIENT)
Dept: ADMINISTRATIVE | Facility: OTHER | Age: 79
End: 2024-09-10
Payer: MEDICARE

## 2024-09-11 ENCOUNTER — PATIENT MESSAGE (OUTPATIENT)
Dept: ADMINISTRATIVE | Facility: OTHER | Age: 79
End: 2024-09-11
Payer: MEDICARE

## 2024-09-12 ENCOUNTER — PATIENT MESSAGE (OUTPATIENT)
Dept: ADMINISTRATIVE | Facility: OTHER | Age: 79
End: 2024-09-12
Payer: MEDICARE

## 2024-09-13 ENCOUNTER — PATIENT MESSAGE (OUTPATIENT)
Dept: ADMINISTRATIVE | Facility: OTHER | Age: 79
End: 2024-09-13
Payer: MEDICARE

## 2024-09-17 ENCOUNTER — TELEPHONE (OUTPATIENT)
Dept: RADIATION ONCOLOGY | Facility: CLINIC | Age: 79
End: 2024-09-17
Payer: MEDICARE

## 2024-09-17 NOTE — TELEPHONE ENCOUNTER
Call to pt regarding his f/u appt with Dr Deng 9/24/24. Appt time changed from 9:00 AM to 10:00 AM as the MD will be in a procedure at 9:00 AM. Pt verbalized understanding and accepts new appt time.

## 2024-09-17 NOTE — PROGRESS NOTES
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 Allen is a 79-year-old male with history of tobacco and COPD/emphysema who was diagnosed with adenocarcinoma of the lung after he presented to the emergency room with hemoptysis in March 2024.  He received concurrent chemotherapy and radiation to the left lung and hilum as well as SRS to a left thalamic lesion as above.  He returns for follow-up.    He was diagnosed with lung cancer after a CTA of the lung on March 19, 2024 which revealed a 3.4 x 5.8 cm left hilar mass suggestive of neoplasm. The mass surrounds the left upper lobe pulmonary artery with probable invasion of the distal left mainstem bronchus and left upper lobe bronchi. There is probable involvement of the superior left pulmonary vein. There are few adjacent subcentimeter left upper lobe nodular foci noted. He underwent EBUS and biopsy on March 21, 2024. Pathology revealed left upper lobe bronchial biopsy consistent with adenocarcinoma. PD-L1 testing and tempus NGS have been ordered with results pending. An MRI of the brain on April 3, 2023 reveals 0.8 cm rim enhancing cystic appearing lesion in the left thalamus which appears cystic without significant mass effect or associated edema. There has no internal diffusion restriction noted. The findings are nonspecific but concerning due to his history of lung cancer. Short interval follow-up is recommended. He is here today for discussion regarding definitive radiation. A PET scan on April 11, 2024 reveals  hypermetabolic left hilar mass measuring 4.8 x 2.3 cm with SUV max of 14. There are adjacent satellite pulmonary nodules seen including a 1.2 cm nodule with faint uptake similar to background. No pathologically enlarged or hypermetabolic mediastinal lymph nodes are noted. He received definitive concurrent chemotherapy and radiation to the left hilum/lung as above.    A repeat MRI of the brain revealed increase in the size of the left thalamic lesion to 2.2 cm.  He received stereotactic radiation using gamma knife to this lesion under the care of Dr. Henson as above.    He is currently on immunotherapy with durvalumab.  A repeat PET scan on September 18, 2024 reveals:   Impression:  In this patient with history of NSCLC of the left lung, interval decreased size and tracer avidity of left hilar mass however, development of hypermetabolic patchy solid and ground-glass opacities with interlobular wall thickening and multiple new pulmonary nodules which can represent post radiation changes versus disease progression.  No hypermetabolic lymphadenopathy. New satellite hypermetabolic pulmonary nodule in the anterior segment of the left upper lobe measure 1 x 0.9 cm with SUV max 9.4 (fused image 110).     At present, patient is on home O2 and reports generalized weakness and fatigue.  His appetite is poor but is currently on Periactin with some improvement.  He had 1 episode of nausea and vomiting which has resolved since then.      PHYSICAL EXAMINATION:  Vitals:    09/24/24 0959   BP: 105/72   BP Location: Right arm   Patient Position: Sitting   BP Method: Medium (Automatic)   Pulse: 97   Temp: 97.7 °F (36.5 °C)   TempSrc: Oral   SpO2: 98%  Comment: 2 liters O2     GENERAL: Patient is alert and oriented, in no acute distress.  In a wheelchair, on oxygen  HEENT: Extraocular muscles are intact.  Oropharynx is clear without lesions.  There is no cervical or supraclavicular adenopathy palpated.    CHEST: Breath sounds clear  bilaterally.  No rales.  No rhonchi.  Unlabored respirations.  CARDIOVASCULAR: Normal S1, S2.  Normal rate.  Regular rhythm.  ABDOMEN: Bowel sounds normal.  No tenderness.  No abdominal distention.  No hepatomegaly.  No splenomegaly.  EXTREMITIES: No clubbing, cyanosis, edema  NEUROLOGIC: Cranial nerves II through XII are grossly intact.  Sensation is intact.  Strength is 5 out of 5 in the upper and lower extremities bilaterally.    ASSESSMENT:   This is a 79-year-old male with history of smoking and COPD, now with cTx N1 M1 carcinoma of the left upper lobeadenocarcinoma of the left hilar region after EBUS on March 21, 2024 who completed concurrent chemotherapy and radiation as well as gamma knife radiotherapy to a left thalamic metastasis.    PLAN:   Mr. Allen is recovering from concurrent chemotherapy and radiation, however somewhat slower than expected.  He was started on Periactin with some improvement in appetite.  He is currently on immunotherapy.  The PET scan reveals response to treatment with decrease in the size and uptake involving the lung mass.  There is patchy opacities noted which is most likely residual radiation reaction.  There is also a new subcentimeter hypermetabolic nodule in the left lung which is too difficult to characterize.  He is scheduled to follow up with Dr. Ponce in 2 days.  I recommend repeat CT of the chest in 6-8 weeks to monitor the new lung nodule.  He will follow up with Dr. Henson with MRI of the brain as planned.

## 2024-09-18 ENCOUNTER — HOSPITAL ENCOUNTER (OUTPATIENT)
Dept: RADIOLOGY | Facility: HOSPITAL | Age: 79
Discharge: HOME | End: 2024-09-18
Attending: RADIOLOGY
Payer: MEDICARE

## 2024-09-18 ENCOUNTER — PATIENT MESSAGE (OUTPATIENT)
Dept: ADMINISTRATIVE | Facility: OTHER | Age: 79
End: 2024-09-18
Payer: MEDICARE

## 2024-09-18 ENCOUNTER — TELEPHONE (OUTPATIENT)
Dept: PALLIATIVE MEDICINE | Facility: CLINIC | Age: 79
End: 2024-09-18
Payer: MEDICARE

## 2024-09-18 DIAGNOSIS — C34.92 ADENOCARCINOMA, LUNG, LEFT: ICD-10-CM

## 2024-09-18 LAB — POCT GLUCOSE: 110 MG/DL (ref 70–110)

## 2024-09-18 PROCEDURE — A9552 F18 FDG: HCPCS | Performed by: RADIOLOGY

## 2024-09-18 PROCEDURE — 78815 PET IMAGE W/CT SKULL-THIGH: CPT | Mod: 26,PS,, | Performed by: NUCLEAR MEDICINE

## 2024-09-18 PROCEDURE — 78815 PET IMAGE W/CT SKULL-THIGH: CPT | Mod: TC

## 2024-09-18 RX ORDER — FLUDEOXYGLUCOSE F18 500 MCI/ML
12 INJECTION INTRAVENOUS
Status: COMPLETED | OUTPATIENT
Start: 2024-09-18 | End: 2024-09-18

## 2024-09-18 RX ADMIN — FLUDEOXYGLUCOSE F-18 11.18 MILLICURIE: 500 INJECTION INTRAVENOUS at 09:09

## 2024-09-19 ENCOUNTER — PATIENT MESSAGE (OUTPATIENT)
Dept: ADMINISTRATIVE | Facility: OTHER | Age: 79
End: 2024-09-19
Payer: MEDICARE

## 2024-09-20 ENCOUNTER — PATIENT MESSAGE (OUTPATIENT)
Dept: ADMINISTRATIVE | Facility: OTHER | Age: 79
End: 2024-09-20
Payer: MEDICARE

## 2024-09-21 ENCOUNTER — PATIENT MESSAGE (OUTPATIENT)
Dept: ADMINISTRATIVE | Facility: OTHER | Age: 79
End: 2024-09-21
Payer: MEDICARE

## 2024-09-22 ENCOUNTER — PATIENT MESSAGE (OUTPATIENT)
Dept: ADMINISTRATIVE | Facility: OTHER | Age: 79
End: 2024-09-22
Payer: MEDICARE

## 2024-09-23 ENCOUNTER — OFFICE VISIT (OUTPATIENT)
Dept: PALLIATIVE MEDICINE | Facility: CLINIC | Age: 79
End: 2024-09-23
Payer: MEDICARE

## 2024-09-23 ENCOUNTER — PATIENT MESSAGE (OUTPATIENT)
Dept: ADMINISTRATIVE | Facility: OTHER | Age: 79
End: 2024-09-23
Payer: MEDICARE

## 2024-09-23 VITALS
OXYGEN SATURATION: 98 % | DIASTOLIC BLOOD PRESSURE: 62 MMHG | BODY MASS INDEX: 27.18 KG/M2 | SYSTOLIC BLOOD PRESSURE: 110 MMHG | WEIGHT: 184.06 LBS | HEART RATE: 104 BPM

## 2024-09-23 DIAGNOSIS — C34.12 ADENOCARCINOMA OF UPPER LOBE OF LEFT LUNG: ICD-10-CM

## 2024-09-23 DIAGNOSIS — R53.83 FATIGUE, UNSPECIFIED TYPE: ICD-10-CM

## 2024-09-23 DIAGNOSIS — R63.0 ANOREXIA: ICD-10-CM

## 2024-09-23 DIAGNOSIS — Z71.89 ACP (ADVANCE CARE PLANNING): ICD-10-CM

## 2024-09-23 DIAGNOSIS — R11.0 NAUSEA: ICD-10-CM

## 2024-09-23 DIAGNOSIS — Z51.5 ENCOUNTER FOR PALLIATIVE CARE: Primary | ICD-10-CM

## 2024-09-23 DIAGNOSIS — R06.00 DYSPNEA, UNSPECIFIED TYPE: ICD-10-CM

## 2024-09-23 DIAGNOSIS — R63.0 DECREASED APPETITE: ICD-10-CM

## 2024-09-23 DIAGNOSIS — G47.00 INSOMNIA, UNSPECIFIED TYPE: ICD-10-CM
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
## 2024-09-23 DIAGNOSIS — K59.00 CONSTIPATION, UNSPECIFIED CONSTIPATION TYPE: ICD-10-CM

## 2024-09-23 DIAGNOSIS — F43.22 ADJUSTMENT DISORDER WITH ANXIOUS MOOD: ICD-10-CM

## 2024-09-23 DIAGNOSIS — R53.83 FATIGUE DUE TO RADIATION THERAPY: ICD-10-CM

## 2024-09-23 PROCEDURE — 99214 OFFICE O/P EST MOD 30 MIN: CPT | Mod: PBBFAC | Performed by: NURSE PRACTITIONER

## 2024-09-23 PROCEDURE — 99205 OFFICE O/P NEW HI 60 MIN: CPT | Mod: S$PBB,,, | Performed by: NURSE PRACTITIONER

## 2024-09-23 PROCEDURE — 99999 PR PBB SHADOW E&M-EST. PATIENT-LVL IV: CPT | Mod: PBBFAC,,, | Performed by: NURSE PRACTITIONER

## 2024-09-23 RX ORDER — MIRTAZAPINE 7.5 MG/1
7.5 TABLET, FILM COATED ORAL NIGHTLY
Qty: 30 TABLET | Refills: 0 | Status: SHIPPED | OUTPATIENT
Start: 2024-09-23 | End: 2024-10-23

## 2024-09-23 RX ORDER — SENNOSIDES 8.6 MG/1
2 TABLET ORAL NIGHTLY
Qty: 60 TABLET | Refills: 0 | Status: SHIPPED | OUTPATIENT
Start: 2024-09-23 | End: 2024-10-23

## 2024-09-23 NOTE — PROGRESS NOTES
"Consult Note  Palliative Care      Consult Requested By: Dr. Isacc Nuñez IV  Reason for Consult: symptom mgmt/ACP      ASSESSMENT/PLAN:     Plan/Recommendations:    09/23/2024:  - initial visit   - start senna 2 tabs bid qhs   - start remeron 7.5 mg nightly for sleep, appetite     Adenocarcinoma of upper lobe of left lung  - following with Dr. Nuñez, Dr. 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 He says "my quality of life is shit", and he likely will not continue treatment if his September scan indicates that the treatment is not working. He does not want to prolong his life if his symptom burden continues to be severe.     Dyspnea /cough   - worsening since he completed chemoRT in June   - uses home O2   - sometimes takes it off to go to kitchen or bathroom bc it's hard to carry around, but will become very dyspneic even walking short distances without it  - always sleeps with O2 And CPAP at night    Decreased appetite/nausea  - significant weight loss, down 20 lbs from pretreatment weight   - 9/23: cont periactin, started a few weeks ago, not sure if this is helping; he has noticed he's started eating more in the last few days  - taste changes (metallic)  - marinol was prescribed, unable to obtain    - zofran prn  - does not like ensure or Pedialyte, too sweet, drinks Tevin Miller  - drinks water throughout the day  - 9/23 starting remeron for sleep, appetite and mood     Fatigue/insomnia  - severe  - will " start remeron 7.5 mg qhs     Constipation   - drinking lots of water   - using dulcolax prn   - start senna 2 tabs nightly     Adjustment disorder with anxiety   - wants directness re his scan results.   - if he were feeling better, he would love to play golf, but he accepts this will not happen  - see [facility] MSW note for full details     Understanding of illness: tbd     Goals of care: tbd    Follow up: 4-6 weeks     Patient's encounter and above plan of care discussed with patient's care team.     SUBJECTIVE:     History of Present Illness:  Patient is a 79 y.o. year old male presenting with adenocarcinoma of upper lobe of left lung. Please see oncology notes for full oncologic history and treatment course.       09/23/2024:  SELENE RANDOLPH reviewed: no concerns     Patient presents to initial visit w his cousin, Naveed. He is currently on immunotherapy, s/p 2nd treatment. He unfortunately has had a very high symptom burden since he completed previous treatment in June. He is in a wheelchair today, wearing portable oxygen and looks unwell. He vomits copiously mid visit. His biggest concern is his declining performance status r/t dyspnea. He cannot meaningfully ambulate without his O2 and these factors are significantly detracting from his quality of life. He did have a PET scan on 09/18 and is scheduled to see radiation oncologist tomorrow to discuss results. He is not sure he wants to continue treatment if his cancer has progressed. What is most important to him now is preservation/improvement of qol.       Past Medical History:   Diagnosis Date    Benign neoplasm of colon 10/01/2013    Bronchitis chronic     CAD (coronary artery disease) 10/31/2013    COPD (chronic obstructive pulmonary disease)     Emphysema of lung     GERD (gastroesophageal reflux disease)     Hx of colonic polyps     Hypertension     NAFLD (nonalcoholic fatty liver disease) 03/27/2017    SHOLA on CPAP     RLS (restless legs syndrome)     Screen  for colon cancer 08/30/2013     Past Surgical History:   Procedure Laterality Date    bilateral foot surgery  1993    CARDIAC CATHETERIZATION  2013    x1    CATARACT EXTRACTION, BILATERAL      COLON SURGERY  2013    Ace Mott MD    COLONOSCOPY N/A 3/21/2018    Procedure: COLONOSCOPY;  Surgeon: Terry Mott MD;  Location: [facility] ENDO (4TH FLR);  Service: Endoscopy;  Laterality: N/A;    COLONOSCOPY N/A 4/12/2019    Procedure: COLONOSCOPY;  Surgeon: John Mantilla MD;  Location: [facility] ENDO (4TH FLR);  Service: Endoscopy;  Laterality: N/A;    COLONOSCOPY N/A 5/31/2022    Procedure: COLONOSCOPY;  Surgeon: MEDINA Farris MD;  Location: [facility] ENDO (4TH FLR);  Service: Endoscopy;  Laterality: N/A;  fully vacc-inst portal-tb    ENDOBRONCHIAL ULTRASOUND Bilateral 4/30/2024    Procedure: ENDOBRONCHIAL ULTRASOUND (EBUS);  Surgeon: Naveed Aguero MD;  [facility];  Service: Pulmonary;  Laterality: Bilateral;    scrotal abscess removal       Family History   Problem Relation Name Age of Onset    Heart disease Mother      Heart attack Mother      Hypertension Mother      No Known Problems Sister      No Known Problems Daughter 2     Asthma Neg Hx      Emphysema Neg Hx      Prostate cancer Neg Hx      Cirrhosis Neg Hx       Review of patient's allergies indicates:   Allergen Reactions    Brilinta [ticagrelor] Itching    Lisinopril      Other reaction(s): cough    Metoprolol succinate Rash       Medications:    Current Outpatient Medications:     albuterol (ACCUNEB) 0.63 mg/3 mL Nebu, Take 3 mLs (0.63 mg total) by nebulization every 6 (six) hours as needed (if symptoms failed to improve with inhaler). Rescue, Disp: 375 mL, Rfl: 11    albuterol (PROVENTIL/VENTOLIN HFA) 90 mcg/actuation inhaler, Inhale 2 puffs into the lungs every 4 (four) hours as needed for Wheezing., Disp: 8.5 g, Rfl: 11    amitriptyline (ELAVIL) 25 MG tablet, Take 1 tablet (25 mg total) by mouth once daily., Disp: 90 tablet, Rfl: 3    aspirin  (ECOTRIN) 81 MG EC tablet, Take 81 mg by mouth once daily. , Disp: , Rfl:     atorvastatin (LIPITOR) 80 MG tablet, TAKE 1 TABLET (80 MG TOTAL) BY MOUTH ONCE DAILY. (Patient taking differently: every evening. TAKE 1 TABLET (80 MG TOTAL) BY MOUTH ONCE DAILY.), Disp: 90 tablet, Rfl: 3    BETA-CAROTENE,A, W-C & E/MIN (OCUVITE ORAL), Take 1 capsule by mouth once daily. , Disp: , Rfl:     budesonide-glycopyr-formoterol (BREZTRI AEROSPHERE) 160-9-4.8 mcg/actuation HFAA, Inhale 2 puffs into the lungs 2 (two) times a day., Disp: 10.7 g, Rfl: 3    cyproheptadine (,PERIACTIN,) 2 mg/5 mL syrup, Take 5 mLs (2 mg total) by mouth every 6 (six) hours for 7 days, THEN 10 mLs (4 mg total) every 6 (six) hours for 21 days., Disp: 473 mL, Rfl: 12    droNABinol (MARINOL) 5 MG capsule, Take 1 capsule (5 mg total) by mouth 2 (two) times daily before meals., Disp: 60 capsule, Rfl: 0    echinacea 400 mg Cap, Take 1 capsule by mouth once daily. , Disp: , Rfl:     fluticasone propionate (FLONASE) 50 mcg/actuation nasal spray, 2 sprays (100 mcg total) by Each Nostril route once daily., Disp: 16 g, Rfl: 11    latanoprost 0.005 % ophthalmic solution, Place 1 drop into both eyes once daily. , Disp: , Rfl:     latanoprost 0.005 % ophthalmic solution, INSTILL 1 DROP INTO BOTH EYES AT BEDTIME, Disp: 7.5 mL, Rfl: 3    losartan (COZAAR) 100 MG tablet, Take 1 tablet (100 mg total) by mouth once daily., Disp: 90 tablet, Rfl: 2    nitroGLYCERIN (NITROSTAT) 0.4 MG SL tablet, Place 1 tablet (0.4 mg total) under the tongue every 5 (five) minutes as needed., Disp: 100 tablet, Rfl: 3    omeprazole (PRILOSEC) 20 MG capsule, Take 1 capsule (20 mg total) by mouth once daily., Disp: 90 capsule, Rfl: 3    ondansetron (ZOFRAN) 4 MG tablet, Take 1 tablet (4 mg total) by mouth every 8 (eight) hours as needed for Nausea., Disp: 30 tablet, Rfl: 1    potassium chloride (KLOR-CON) 20 mEq Pack, Take 20 mEq (1 packet) twice a day by oral route for 4 days., Disp: 8 packet,  Rfl: 0    roflumilast (DALIRESP) 500 mcg Tab, Take 1 tablet (500 mcg total) by mouth once daily., Disp: 90 tablet, Rfl: 3  No current facility-administered medications for this visit.    Facility-Administered Medications Ordered in Other Visits:     dextrose 50% injection 12.5 g, 12.5 g, Intravenous, PRN, Lidia Deleon MD    dextrose 50% injection 25 g, 25 g, Intravenous, PRN, Lidia Deleon MD    insulin regular injection 0-8 Units, 0-8 Units, Intravenous, Continuous PRN, Lidia Deleon MD    OBJECTIVE:       ROS:  Review of Systems   Constitutional:  Positive for activity change, appetite change, fatigue and unexpected weight change.   HENT:  Positive for hearing loss.    Eyes:  Negative for discharge, itching and visual disturbance.   Respiratory:  Positive for cough and shortness of breath. Negative for choking.    Cardiovascular:  Negative for chest pain, palpitations and leg swelling.   Gastrointestinal:  Positive for constipation, nausea and vomiting. Negative for abdominal pain and diarrhea.   Genitourinary:  Positive for frequency.   Musculoskeletal:  Negative for arthralgias, gait problem and joint swelling.   Skin:  Negative for pallor, rash and wound.   Neurological:  Positive for weakness. Negative for dizziness and light-headedness.   Psychiatric/Behavioral:  Positive for dysphoric mood and sleep disturbance. The patient is nervous/anxious.        Review of Symptoms      Symptom Assessment (ESAS 0-10 Scale)  Pain:  0  Dyspnea:  7  Anxiety:  4  Nausea:  4  Depression:  0  Anorexia:  8  Fatigue:  7  Insomnia:  7  Restlessness:  0  Agitation:  0     CAM / Delirium:  Negative  Constipation:  Positive  Diarrhea:  Negative    Anxiety:  Is nervous/anxious  Constipation:  Constipation    Bowel Management Plan (BMP):  Yes      Pain Assessment:  OME in 24 hours:  0  Location(s): none      Modified Minesh Scale:  2    ECOG Performance Status rdGrdrrdarddrderd:rd rd3rd Living Arrangements:  Lives with  family    Psychosocial/Cultural:   See Palliative Psychosocial Note: Yes  **Primary  to Follow**  Palliative Care  Consult: No    Advance Care Planning   Advance Directives:   Living Will: No    Medical Power of : No      Decision Making:  Patient answered questions  Goals of Care: The patient endorses that what is most important right now is to focus on remaining as independent as possible, symptom/pain control, and quality of life, even if it means sacrificing a little time    Accordingly, we have decided that the best plan to meet the patient's goals includes tbd PET scan results              Physical Exam:  Vitals:    Vitals:    09/23/24 0913   BP: 110/62   Pulse: 104       Physical Exam  Vitals reviewed.   Constitutional:       General: He is not in acute distress.     Appearance: Normal appearance. He is normal weight. He is ill-appearing. He is not toxic-appearing or diaphoretic.   HENT:      Head: Normocephalic and atraumatic.      Right Ear: External ear normal.      Left Ear: External ear normal.      Nose: Nose normal.   Eyes:      General:         Right eye: No discharge.         Left eye: No discharge.      Conjunctiva/sclera: Conjunctivae normal.   Cardiovascular:      Rate and Rhythm: Tachycardia present.   Pulmonary:      Effort: Pulmonary effort is normal.      Breath sounds: No stridor.   Musculoskeletal:         General: No deformity or signs of injury.      Cervical back: Normal range of motion.      Right lower leg: No edema.      Left lower leg: No edema.   Skin:     General: Skin is warm and dry.   Neurological:      Mental Status: He is alert and oriented to person, place, and time. Mental status is at baseline.      Motor: Weakness present.       Labs:  CBC:   WBC   Date Value Ref Range Status   08/29/2024 7.51 3.90 - 12.70 K/uL Final     Hemoglobin   Date Value Ref Range Status   08/29/2024 10.6 (L) 14.0 - 18.0 g/dL Final     POC Hematocrit   Date Value Ref  Range Status   07/23/2024 33 (L) 36 - 54 %PCV Final     Hematocrit   Date Value Ref Range Status   08/29/2024 34.4 (L) 40.0 - 54.0 % Final     MCV   Date Value Ref Range Status   08/29/2024 87 82 - 98 fL Final     Platelets   Date Value Ref Range Status   08/29/2024 176 150 - 450 K/uL Final       LFT:   Lab Results   Component Value Date    AST 24 08/29/2024    ALKPHOS 96 08/29/2024    BILITOT 1.0 08/29/2024       Albumin:   Albumin   Date Value Ref Range Status   08/29/2024 3.1 (L) 3.5 - 5.2 g/dL Final     Protein:   Total Protein   Date Value Ref Range Status   08/29/2024 7.4 6.0 - 8.4 g/dL Final       Radiology:I have reviewed all pertinent imaging results/findings within the past 24 hours.    07/26/2024 MRI brain:    Impression:     Increased size of peripherally enhancing cystic lesion in the left thalamus, now measuring 2.2 cm.  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  No new lesion.  Only mild edema without midline shift.    09/18/2024 NM PET: Impression:     In this patient with history of NSCLC of the left lung, interval decreased size and tracer avidity of left hilar mass however, development of hypermetabolic patchy solid and ground-glass opacities with interlobular wall thickening and multiple new pulmonary nodules which can represent post radiation changes versus disease progression.  No hypermetabolic lymphadenopathy.     Indeterminate focal tracer uptake in the left axilla without CT correlate and increased muscular uptake in the left arm, likely from the same side injection.  Attention on follow-up.    I spent a total of 80 minutes on the day of the visit.This includes face to face time in discussion of goals of care, symptom assessment, coordination of care and emotional support.  This also includes non-face to face time preparing to see the patient (eg, review of tests/imaging), obtaining and/or reviewing separately obtained history, documenting clinical  information in the electronic or other health record, independently interpreting results and communicating results to the patient/family/caregiver, or care coordinator.     A total of 20 min was spent on advance care planning, goals of care discussion, emotional support, formulating and communicating prognosis and goals of care, exploring burden/benefit of various approaches of treatment. This discussion occurred on a fully voluntary basis with the verbal consent of the patient and/or family    Signature: Tashia Alejandro DNP

## 2024-09-23 NOTE — Clinical Note
Tara the delayed note. I had an initial visit with this patient yesterday who is very concerned with his poor quality of life. He told me he will be reluctant to continue treatment if his PET reveals disease progression. -- Thanks, Tashia

## 2024-09-23 NOTE — PROGRESS NOTES
Advance Care Planning   [facility] Palliative Medicine [facility]  Palliative Care   Psychosocial Assessment    Patient Name: Jordin Allen Jr.  MRN: [id number]  Palliative Care Provider:  Tashia Alejandro DNP   Primary Care Physician: Sierra Landry MD  Principal Problem: Adenocarcinoma of upper lobe of left lung     Reason for Referral:  Advanced Care Planning and Psychosocial Support       Present during Interview: patient and cousin/Naveed  .      Primary Language:English   Needed: no      Past Medical Situation:   PMH:   Past Medical History:   Diagnosis Date    Benign neoplasm of colon 10/01/2013    Bronchitis chronic     CAD (coronary artery disease) 10/31/2013    COPD (chronic obstructive pulmonary disease)     Emphysema of lung     GERD (gastroesophageal reflux disease)     Hx of colonic polyps     Hypertension     NAFLD (nonalcoholic fatty liver disease) 03/27/2017    SHOLA on CPAP     RLS (restless legs syndrome)     Screen for colon cancer 08/30/2013     Mental Health/Substance Use History: Patient exhibits signs of depression   Risk of Abuse, neglect or exploitation: None identified   Current or Previous Trauma and/or evidence of PTSD: None identified   Non-traditional Health practices: None identified     Understanding of diagnosis and prognosis: Fair   Experience/Comfort level with health care system: Fair     Patients Mental Status: Alert and oriented to person, place, time and situation with depressed affect     Socio-Economic Factors/Resources:  Address: [address] [place] [address]  Phone Number: [phone] (home)     Marital Status:    Household composition: Patient recently moved in with daughter and son-in-law  Children: Two daughters     Patient/Family perceptions about Caregiving Needs; availability and capacity: Patient expresses concerns regarding his daughter's ability to assist as him as his needs progress.  SW reached out to daughter at patient's  request to discuss sitter services,nursing home, home health and hospice     Family Dynamics/Relationships: healthy     Patient/Family Strengths/Resilience: Patient openly expresses his concerns regarding his functional decline and futility of treatment if scans reveal progression of disease.   Patient/Family Coping:  Fair.  Team will continue to assess need for additional emotional support.      Activities of Daily Living: Assistance as needed   Support Systems-Family & Community (Home Health, HME etc): n/a     Transportation:  yes    Work/Education History: retired   Self-Care Activities/Hobbies: golfing, volunteering for his [employer], watching TV      History: yes; [employer] during [place]     Financial Resources:  Medicare    Advanced Care Planning & Legal Concerns:   Advanced Directives/Living Will: no  [place]POST/POLST: no   Planning:  no    Power of : no    Emergency Contacts: Marilou Grimaldo/Daughter    Spirituality, Culture & Coping Mechanisms:  F- Lynette and Belief: [employer]     I - Importance: High     C - Community/Culture Values: Patient was an active volunteer for his [employer] prior to illness.      A - Address in Care: Needs met. Patient in regular contact with  and receives communion weekly.       Goals/Hopes/Expectations: Patient hopes to prepare his daughter for his future needs and maintain an adequate quality of life.   Fears/Anxiety/Concerns: Patient is fearful he will become a burden to his loved ones.       Complicated Bereavement Risk Assessment Tool (CBRAT)  Reference:  [facility] Clinical Practice Group (May 2016). Bereavement Risk Screening and Management Guidelines.  Retrieved from: http://www.grpcc.com.au/wp-content/uploads//OHNJM-Fgenhebguks-Ccagodonm-and-Management-Guideline-2016.pdf      Bereaved Client Characteristics   Under 18      no  Was a Twin   no  Young Spouse   no  Elderly Spouse    no  Isolated    no  Lacks  Meaningful Social Support   no  Dissatisfied with help available during illness   no  New to Financial [place] no  New to Decision-Making   no    Illness  Inherited Disorder   no  Stigmatized Disease in the family/community   no  Lengthy/Burdensome   no     Bereaved Client's History of Loss   Cumulative Multiple Losses   no  Previous Mental Health Illnesses   no  Current Mental Health Illness   no  Other Significant Health Issues   yes   Migrant/Refugee   no Death  Sudden or Unexpected   no  Traumatic Circumstances Associated with Death   no  Significant Cultural/Social Burdens as a result of Death   yes   Relationship with   Profound Lifelong Partner   no  Highly Dependent    no  Antagonistic   no  Ambivalent   no  Deeply Connected   yes  Culturally Defined   yes   Risk Factors Scores  0-2  Low  3-5  Moderate  5+  High  All persons scoring moderate to high presume to be at risk**    (** It is acknowledged that protective factors and resilience may outweigh apparent risk factors.      Total Risk Factors Score:   Moderate.  Patient's family suffered the loss of patient's spouse to cancer ten years ago.  Patient's daughter/Marzena presently diagnosed with scleroderma.     Advance Care Planning     Date: 2024    [facility]  Team engaged the patient in a voluntary conversation about advance care planning and we specifically addressed what the goals of care would be moving forward, in light of the patient's change in clinical status, specifically adenocarcinoma of lung .  We did not specifically address the patient's likely prognosis, which is  TBD by Oncology .  We explored the patient's values and preferences for future care.  The patient endorses that what is most important right now is to focus on remaining as independent as possible, symptom/pain control, and improvement in condition but with limits to invasive therapies    Accordingly, we have decided that the best plan to meet the patient's  goals includes continuing with treatment    A total of 15 min was spent on advance care planning, goals of care discussion, emotional support, formulating and communicating prognosis and exploring burden/benefit of various approaches of treatment. This discussion occurred on a fully voluntary basis with the verbal consent of the patient and/or family.         Plan of Care:  BRIANNE accompanied DNP during initial visit with patient and cousin/Naveed.  Patient presents Oriented x4 with depressed affect. Patient appears to have a fair understanding of their illness. Patient reports he will learn more regarding the effectiveness of his current treatment at his oncology appointments scheduled for this week.  Patient reflected upon his symptom burden and functional decline.  Patient acknowledges if treatment does not appear effective in slowing the growth of his cancer, he has considered stopping treatment. Patient reports he does not feel the burdens of treatment are worth his decline in QoL if the treatment is not effective. SW commended patient on his candor and encouraged patient express these wishes to his Oncology. Patient reports his primary goal is to prepare his daughter for the future, specifically arranging for enhanced care at home.  Patient notes he does not want to become a burden on his daughter and wishes to have a plan in place when further assistance is needed.  SW educated patient regarding the insurance limitations as it relates to in-home care.  Patient requested this writer reach out to his daughter to discuss further.      SW offered to complete HCPOA with patient.  Patient declined on this date noting he believes he has completed one already assigning his daughter/Marilou as decision maker.  Patient notes he wishes to omit his daughter/Marzena from this responsibility as she sufferers with Scleroderma.  SW noted she would ask patient's daughter regarding this directive when follow-up call is performed.       BRIANNE contacted daughter/Marilou following visit.  SW educated daughter regarding various levels of support/care including home health, paid sitter services, nursing home placement, LTAC/SNF, inpatient and home hospice. Daughter has moderate experience with end of life as patient's spouse passed at [facility] inpatient hospice ten years ago following a hospitalization/admit to LTAC for conditions related to cancer.  Daughter reports she feels patient would continue treatment if offered.  SW noted on this date, patient expressed he would not want to continue treatment if scans show no improvement of his disease.  Daughter acknowledged understanding and notes she and patient will have a better understanding of patient's goals following his appointments this week. Daughter acknowledges her limitations as it relates to caring for patient including an inability to transfer patient and personal discomfort in tending to patient's personal hygiene.  Daughter reports her goal is to keep patient at home.  Daughter notes she feels the family can accommodate sitter services a few times weeks.  Of note, patient's son-in-law works from home and is available in emergencies.      SW forwarded daughter a list of paid sitter services for review.  Daughter denies further psychosocial concerns. SW remains available to provide support; will continue to follow.    Ayse Arechiga, LCSW-BACS    Palliative Medicine

## 2024-09-24 ENCOUNTER — OFFICE VISIT (OUTPATIENT)
Dept: RADIATION ONCOLOGY | Facility: CLINIC | Age: 79
End: 2024-09-24
Payer: MEDICARE

## 2024-09-24 ENCOUNTER — TELEPHONE (OUTPATIENT)
Dept: CARDIOLOGY | Facility: CLINIC | Age: 79
End: 2024-09-24
Payer: MEDICARE

## 2024-09-24 VITALS
SYSTOLIC BLOOD PRESSURE: 105 MMHG | DIASTOLIC BLOOD PRESSURE: 72 MMHG | HEART RATE: 97 BPM | OXYGEN SATURATION: 98 % | TEMPERATURE: 98 F

## 2024-09-24 DIAGNOSIS — C34.92 ADENOCARCINOMA, LUNG, LEFT: Primary | ICD-10-CM

## 2024-09-24 PROCEDURE — 99213 OFFICE O/P EST LOW 20 MIN: CPT | Mod: PBBFAC | Performed by: RADIOLOGY

## 2024-09-24 PROCEDURE — 99213 OFFICE O/P EST LOW 20 MIN: CPT | Mod: S$PBB,,, | Performed by: RADIOLOGY

## 2024-09-24 PROCEDURE — 99999 PR PBB SHADOW E&M-EST. PATIENT-LVL III: CPT | Mod: PBBFAC,,, | Performed by: RADIOLOGY

## 2024-09-24 NOTE — TELEPHONE ENCOUNTER
----- Message from Geena Gallardo RN sent at 9/24/2024  1:28 PM CDT -----  Regarding: FW: [facility] PET    ----- Message -----  From: Inge Fregoso  Sent: 9/24/2024  11:22 AM CDT  To: Mejia Morrison Staff  Subject: [facility] PET                                          Pt scheduled his f/u with Dr. Boles on a day Dr. Ba is working with the fellows 11/26/24. Please erickson his PET before his appt.    Thanks

## 2024-09-25 ENCOUNTER — PATIENT MESSAGE (OUTPATIENT)
Dept: ADMINISTRATIVE | Facility: OTHER | Age: 79
End: 2024-09-25
Payer: MEDICARE

## 2024-09-26 ENCOUNTER — OFFICE VISIT (OUTPATIENT)
Dept: HEMATOLOGY/ONCOLOGY | Facility: CLINIC | Age: 79
End: 2024-09-26
Payer: MEDICARE

## 2024-09-26 ENCOUNTER — LAB VISIT (OUTPATIENT)
Dept: LAB | Facility: HOSPITAL | Age: 79
End: 2024-09-26
Attending: HOSPITALIST
Payer: MEDICARE

## 2024-09-26 ENCOUNTER — PATIENT MESSAGE (OUTPATIENT)
Dept: ADMINISTRATIVE | Facility: OTHER | Age: 79
End: 2024-09-26
Payer: MEDICARE

## 2024-09-26 VITALS
OXYGEN SATURATION: 98 % | WEIGHT: 183.63 LBS | HEART RATE: 88 BPM | TEMPERATURE: 98 F | DIASTOLIC BLOOD PRESSURE: 60 MMHG | SYSTOLIC BLOOD PRESSURE: 93 MMHG | HEIGHT: 69 IN | RESPIRATION RATE: 18 BRPM | BODY MASS INDEX: 27.2 KG/M2

## 2024-09-26 DIAGNOSIS — R53.83 FATIGUE DUE TO RADIATION THERAPY: ICD-10-CM

## 2024-09-26 DIAGNOSIS — C34.12 ADENOCARCINOMA OF UPPER LOBE OF LEFT LUNG: ICD-10-CM

## 2024-09-26 DIAGNOSIS — N17.9 AKI (ACUTE KIDNEY INJURY): ICD-10-CM

## 2024-09-26 DIAGNOSIS — C79.9 METASTATIC NEOPLASM: ICD-10-CM

## 2024-09-26 DIAGNOSIS — R63.0 DECREASED APPETITE: ICD-10-CM

## 2024-09-26 DIAGNOSIS — C34.12 ADENOCARCINOMA OF UPPER LOBE OF LEFT LUNG: Primary | ICD-10-CM

## 2024-09-26 DIAGNOSIS — C79.31 SECONDARY MALIGNANT NEOPLASM OF BRAIN: ICD-10-CM
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
## 2024-09-26 DIAGNOSIS — R06.00 DYSPNEA, UNSPECIFIED TYPE: ICD-10-CM

## 2024-09-26 DIAGNOSIS — C77.1 SECONDARY MALIGNANT NEOPLASM OF INTRATHORACIC LYMPH NODES: ICD-10-CM

## 2024-09-26 LAB
ALBUMIN SERPL BCP-MCNC: 2.9 G/DL (ref 3.5–5.2)
ALP SERPL-CCNC: 109 U/L (ref 55–135)
ALT SERPL W/O P-5'-P-CCNC: 17 U/L (ref 10–44)
ANION GAP SERPL CALC-SCNC: 13 MMOL/L (ref 8–16)
AST SERPL-CCNC: 19 U/L (ref 10–40)
BILIRUB SERPL-MCNC: 0.6 MG/DL (ref 0.1–1)
BUN SERPL-MCNC: 11 MG/DL (ref 8–23)
CALCIUM SERPL-MCNC: 9.6 MG/DL (ref 8.7–10.5)
CHLORIDE SERPL-SCNC: 106 MMOL/L (ref 95–110)
CO2 SERPL-SCNC: 28 MMOL/L (ref 23–29)
CREAT SERPL-MCNC: 1.7 MG/DL (ref 0.5–1.4)
ERYTHROCYTE [DISTWIDTH] IN BLOOD BY AUTOMATED COUNT: 14.5 % (ref 11.5–14.5)
EST. GFR  (NO RACE VARIABLE): 41 ML/MIN/1.73 M^2
GLUCOSE SERPL-MCNC: 113 MG/DL (ref 70–110)
HCT VFR BLD AUTO: 33.8 % (ref 40–54)
HGB BLD-MCNC: 10.5 G/DL (ref 14–18)
IMM GRANULOCYTES # BLD AUTO: 0.05 K/UL (ref 0–0.04)
MCH RBC QN AUTO: 26.6 PG (ref 27–31)
MCHC RBC AUTO-ENTMCNC: 31.1 G/DL (ref 32–36)
MCV RBC AUTO: 86 FL (ref 82–98)
NEUTROPHILS # BLD AUTO: 7.3 K/UL (ref 1.8–7.7)
PLATELET # BLD AUTO: 192 K/UL (ref 150–450)
PMV BLD AUTO: 10.5 FL (ref 9.2–12.9)
POTASSIUM SERPL-SCNC: 3.2 MMOL/L (ref 3.5–5.1)
PROT SERPL-MCNC: 7 G/DL (ref 6–8.4)
RBC # BLD AUTO: 3.95 M/UL (ref 4.6–6.2)
SODIUM SERPL-SCNC: 147 MMOL/L (ref 136–145)
T4 FREE SERPL-MCNC: 1.15 NG/DL (ref 0.71–1.51)
TSH SERPL DL<=0.005 MIU/L-ACNC: 0.75 UIU/ML (ref 0.4–4)
WBC # BLD AUTO: 9.22 K/UL (ref 3.9–12.7)

## 2024-09-26 PROCEDURE — G2211 COMPLEX E/M VISIT ADD ON: HCPCS | Mod: S$PBB,,, | Performed by: HOSPITALIST

## 2024-09-26 PROCEDURE — 99215 OFFICE O/P EST HI 40 MIN: CPT | Mod: PBBFAC | Performed by: HOSPITALIST

## 2024-09-26 PROCEDURE — 99999 PR PBB SHADOW E&M-EST. PATIENT-LVL V: CPT | Mod: PBBFAC,,, | Performed by: HOSPITALIST

## 2024-09-26 PROCEDURE — 80053 COMPREHEN METABOLIC PANEL: CPT | Performed by: HOSPITALIST

## 2024-09-26 PROCEDURE — 85027 COMPLETE CBC AUTOMATED: CPT | Performed by: HOSPITALIST

## 2024-09-26 PROCEDURE — 99215 OFFICE O/P EST HI 40 MIN: CPT | Mod: S$PBB,,, | Performed by: HOSPITALIST

## 2024-09-26 PROCEDURE — 84443 ASSAY THYROID STIM HORMONE: CPT | Performed by: HOSPITALIST

## 2024-09-26 PROCEDURE — 84439 ASSAY OF FREE THYROXINE: CPT | Performed by: HOSPITALIST

## 2024-09-26 PROCEDURE — 36415 COLL VENOUS BLD VENIPUNCTURE: CPT | Performed by: HOSPITALIST

## 2024-09-26 NOTE — PROGRESS NOTES
[facility] MEDICAL ONCOLOGY - FOLLOW UP VISIT    Reason for visit: Adenocarcinoma of the SANJAY      Oncology History   Adenocarcinoma, lung, left   3/19/2024 Imaging Significant Findings    CTA PE (hemoptysis)  - 3.4 x 5.8 cm L hilar mass, probable invasion of distal L main bronchus and SANJAY bronchi     3/19/2024 - 3/22/2024 Hospital Admission    Presented with new onset hemoptysis.  Imaging demonstrated left hilar mass.  Bronchoscopy performed and confirmed adenocarcinoma, EGFR Exon 20 insertion.     3/21/2024 Procedure    Bronch w/ EBUS  SANJAY, Endobronchial Bx  - Adenocarcinoma (CK7 and TTF1 positive; CK20, p40, and CK 5/6 negative)    SANJAY, BAL  - Adenocarcinoma    TEMPUS NGS/PD-L1  - PD-L1 TPS: 35%  - EGFR exon 20 inframe insertion ([id number])  - TP53 misssense ([id number])  - Negative: EGFR, ALK, BRAF, KRAS, ROS1, RET, MET, ERBB2  - TMB 9.5 m/MB     4/3/2024 Imaging Significant Findings    MRI Brain  - 0.8 cm rim enhancing lesion in L thalamus, no significant mass effect      4/11/2024 Imaging Significant Findings    PET CT  - 4.8 x 2.3 cm  L hilar mass, SUV 14  - Adjacent pulmonary nodules, e.g. 1.2 cm  - No intrathoracic LAD     4/12/2024 Cancer Staged    Staging form: Lung, AJCC 8th Edition  - Clinical stage from 4/12/2024: Stage STEF (cT3, cN1, cM1b)     4/30/2024 Procedure    Bronch with EBUS  RUL, EBBx  - Adenocarcinoma (TTF-1 positive, p40 negative)    LN  - Adenocarcinoma    Procedure Note  - Station for L was normal-sized not sampled; no other lymph nodes were visible by EBUS, including station 7, 4R, and 11R     5/8/2024 - 6/18/2024 Radiation Therapy    Treatment Summary  Course: C1 Lung 2024  Treatment Site Energy Dose/Fx (Gy) #Fx Total Dose (Gy) Start Date End Date Elapsed Days   Left hilum/lung 6X 2 30 / 30 60 5/8/2024 6/18/2024 41        5/9/2024 - 6/18/2024 Chemotherapy    Carboplatin/Paclitaxel, Concurrent w/ Radiation Therapy  Weekly  - Carboplatin AUC 2  -  Paclitaxel 45 mg/m2     6/27/2024 Imaging Significant Findings    CT C/A/P  - 3.2 x 3.1 cm left hilar mass, previously 3.7 cm  - Previously seen 1.2 cm nodule adjacent to primary mass not seen on this exam  - Near complete resolution of multiple prior nodular opacities  - Stable RUL micronodule     7/18/2024 -  Chemotherapy    Treatment Summary   Plan Name: OP DURVALUMAB 1500 MG Q4W  Treatment Goal: Curative  Status: Active  Start Date: 7/18/2024  End Date: 7/3/2025 (Planned)  Provider: Isacc Nuñez IV, MD  Chemotherapy: [No matching medication found in this treatment plan]     7/26/2024 Imaging Significant Findings    MRI Brain  - 2.2 cm peripherally enhancing cystic lesion of left thalamus, previously 0.8 cm.  Smaller surrounding edema signal, localized mass effect with slight effacement of the adjacent lateral ventricle, no hydrocephalus or midline shift.     8/2/2024 - 8/7/2024 Radiation Therapy    Treating physician: Bienvenido Henson  GK SRT  Target # Site Dose per Fraction (Gy) Cumulative Dose % Isodose Line Fraction # Total Fractions   1 Lt Thalamic 9 27 51 3 3             Oncology History    HPI:     Jordin Allen Jr. is a 78 y.o. 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     Last clinic 8/29/24, proceed with C2 durvalumab, PET-CT scheduled 9/18/24 (3 months from prior).  C3 and RTC on 09/26.  MRI brain on 10/07 for brain met.  Continue Periactin for decreased appetite.  Transition do dissolving Zofran.  Multiple causes for  "fatigue.    Interval History:  - 8/29/24: C2D1 on 8/29/24  - 9/6/24: Iron studies consistent with chronic inflammation, hold on iatrogenic iron  - 9/18/24: PET-CT, interval decreased size and tracer avidity of left hilar mass however, development of hypermetabolic patchy solid and ground-glass opacities with interlobular wall thickening and multiple new pulmonary nodules which can represent post radiation changes versus disease progression. No hypermetabolic lymphadenopathy.   - 9/23/24:  Palliative care a cough and dyspnea worsening, dyspneic with even short distances.  Has started eating more in the last few days, continue Periactin.  - 9/24/24:  Radiation oncology PET scan reveals response to treatment with decrease in size and uptake involving lung mass, patchy opacities likely residual radiation reaction.  Also new subcentimeter hypermetabolic nodule in left lung which is to difficult to characterize.  Repeat CT C in 6-8 weeks monitor.  MRI brain is planned.    Pt states that he has been having a very difficult time getting to sleep.This has been chiefly over the past week. He is having a difficult time both falling asleep and staying asleep. He tried mirtazapine, to no effect.     He continues to be fatigued, which is worse with his recent sleep issues. He feels this may be "a little worse" than our last visit.     He says that, every time he gets up, he is "short of breath". He says that getting up to use the bathroom or to get water, he is dyspneic. He continues to use 2L O2. He says this has been worse in the past week, and he says "this has been significant".     He says that the periactin "has helped, not a lot, but it has helped" regarding his appetite. He says that his mouth has been very dry since staring this, and that he has a metallic taste in his mouth. He weighs 183.64, from 184 lbs on 9/23, from 210 on 7/23/24.     He denies any pain.     Diarrhea remains resolved. He notes mild constipation. "     ROS:   As per HPI.     Physical Exam:       There were no vitals taken for this visit.               Physical Exam  Constitutional:       Appearance: Normal appearance.      Comments: Sitting in wheelchair, appears fatigued   HENT:      Head: Normocephalic and atraumatic.   Eyes:      Extraocular Movements: Extraocular movements intact.      Conjunctiva/sclera: Conjunctivae normal.      Pupils: Pupils are equal, round, and reactive to light.   Cardiovascular:      Rate and Rhythm: Normal rate and regular rhythm.      Heart sounds: No murmur heard.     No friction rub. No gallop.   Pulmonary:      Effort: Pulmonary effort is normal.      Breath sounds: No wheezing, rhonchi or rales.   Musculoskeletal:         General: Normal range of motion.      Right lower leg: No edema.      Left lower leg: No edema.   Skin:     General: Skin is warm and dry.   Neurological:      Mental Status: He is alert and oriented to person, place, and time.   Psychiatric:         Mood and Affect: Mood normal.         Thought Content: Thought content normal.         Judgment: Judgment normal.           Labs:   No results found for this or any previous visit (from the past 48 hour(s)).      Imaging:    See oncologic history above.     Path:  See oncologic history above.      Assessment and Plan:     Jordin Allen Jr. is a 78 y.o. 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     Stage IIIA Adenocarcinoma of L Hilum, EGFR Exon 20 insertion, PD-L1 35%  Dyspnea on  Exertion  ECOG PS 2-3 (limited by fatigue, weakness, nausea). The patient is currently on durvalumab consolidation, with multiple ongoing symptoms which are likely multifactorial (decreased appetite, nausea, fatigue, diarrhea; see below).  He also notes significant worsening of his dyspnea on exertion over the past approximate 1 week.  Most recent imaging (PET-CT, 9/18/24, on durvalumab consolidation) demonstrates a relatively stable left hilar mass with decreased SUV (4.9 x 2.4 cm with SUV 7.3, previously 4.8 x 2.3 cm, SUV 14), decreased size of a SANJAY satellite pulmonary nodule (0.5 cm from 1.2 cm) new satellite hypermetabolic pulmonary nodule in the anterior SANJAY (potentially radiation change), and patchy solid and ground-glass opacities in this posterior SANJAY (likely radiation change).  While this imaging does not have overt signs of pneumonitis, the worsening dyspnea over the past week is somewhat concerning for this.  Had a long conversation with the patient regarding risk of flaring potential pneumonitis and the option to hold current treatment.  He endorses desire to hold treatment at this time with short interval re-evaluation of symptoms and low threshold for repeat CT imaging of the chest. We have previously discussed indications to message / present to the ED, including worsening respiratory status.      PLAN:   Hold durvalumab today  Short interval symptom follow up w/ low threshold for CT chest or trial of steroids (noting these may also help w/ fatigue, but also that pt is on immunotherapy)      Brain Met  0.8 cm rim enhancing lesion in L thalamus on initial imaging. On initial review with radiology, low likelihood of malignancy and instead more likely cystic lesion, so pt treated w/ curative intent therapy for intrathoracic disease with plan for surveillance. Unfortunately, repeat MRI brain on 7/26/24 demonstrated growth to 2.2 cm. Pt now s/p GK to this solitary lesion, continuing to treat as  oligometastatic disease as above.   MRI brain on 10/7/24  NSGY and rad onc f/u on 10/8/24      ZACH  Creatinine 1.7, pt notes that he has been urinating frequently. FENa is 0.3%, indicating likely prerenal etiology.  Increased PO intake, w/ repeat CMP      Decreased Appetite  Nausea  Pt endorses a significantly decreased appetite. He has lost 9 lbs over ~3 weeks, and is about 20 lbs down from his pre-treatment weight. Likely multifactorial, including recent CRT, GK to CNS met, and diarrheal illness. Currently on mirtazapine to minimal effect. Declines f/u with onc nutrition, stating that he has heard the recommendations before. Attempted dronabinol, however unable to procure this due to shortage and so instead started on Periactin. Low threshold for short course of steroids. Nausea of unclear etiology, perhaps related to CNS disease though less likely s/p treatment.   Continue periactin  Pt will continue to inquire regarding availability of dronabinol  Refill of ondansetron sent, transitioned to pill form due to taste of dissolving tabs  Low threshold for short course of steroids (likely dex 2 mg BID x 5 days)  Palliative care f/u       Fatigue  Likely multifactorial, including completion of CRT, GK to CNS met, diarrheal illness, and decreased PO intake.  Treating contributory symptoms as above  Low threshold for short course of steroids (likely dex 2 mg BID x 5 days)  Continue to follow with palliative care      The above information has been reviewed with the patient and all questions have been answered to their apparent satisfaction.  They understand that they can call the clinic with any questions.    Isacc Nuñez MD  Hematology/Oncology  [facility]      Route Chart for Scheduling  Med Onc Route Chart for Scheduling        Disclaimer: This note was prepared using voice recognition system and is likely to have sound alike errors and is not proof read.  Please call me with any questions.

## 2024-09-27 ENCOUNTER — PATIENT MESSAGE (OUTPATIENT)
Dept: ADMINISTRATIVE | Facility: OTHER | Age: 79
End: 2024-09-27
Payer: MEDICARE

## 2024-09-28 ENCOUNTER — PATIENT MESSAGE (OUTPATIENT)
Dept: ADMINISTRATIVE | Facility: OTHER | Age: 79
End: 2024-09-28
Payer: MEDICARE

## 2024-09-29 ENCOUNTER — PATIENT MESSAGE (OUTPATIENT)
Dept: ADMINISTRATIVE | Facility: OTHER | Age: 79
End: 2024-09-29
Payer: MEDICARE

## 2024-09-30 ENCOUNTER — PATIENT MESSAGE (OUTPATIENT)
Dept: ADMINISTRATIVE | Facility: OTHER | Age: 79
End: 2024-09-30
Payer: MEDICARE

## 2024-09-30 ENCOUNTER — EXTERNAL CHRONIC CARE MANAGEMENT (OUTPATIENT)
Dept: PRIMARY CARE CLINIC | Facility: CLINIC | Age: 79
End: 2024-09-30
Payer: MEDICARE

## 2024-09-30 PROCEDURE — 99439 CHRNC CARE MGMT STAF EA ADDL: CPT | Mod: PBBFAC,PO | Performed by: INTERNAL MEDICINE

## 2024-09-30 PROCEDURE — 99490 CHRNC CARE MGMT STAFF 1ST 20: CPT | Mod: S$PBB,,, | Performed by: INTERNAL MEDICINE

## 2024-09-30 PROCEDURE — 99439 CHRNC CARE MGMT STAF EA ADDL: CPT | Mod: S$PBB,,, | Performed by: INTERNAL MEDICINE

## 2024-09-30 PROCEDURE — 99490 CHRNC CARE MGMT STAFF 1ST 20: CPT | Mod: PBBFAC,PO | Performed by: INTERNAL MEDICINE

## 2024-10-01 ENCOUNTER — HOSPITAL ENCOUNTER (OUTPATIENT)
Dept: RADIATION THERAPY | Facility: HOSPITAL | Age: 79
Discharge: HOME | End: 2024-10-01
Attending: RADIOLOGY
Payer: MEDICARE

## 2024-10-01 ENCOUNTER — PATIENT MESSAGE (OUTPATIENT)
Dept: ADMINISTRATIVE | Facility: OTHER | Age: 79
End: 2024-10-01
Payer: MEDICARE

## 2024-10-02 ENCOUNTER — PATIENT MESSAGE (OUTPATIENT)
Dept: ADMINISTRATIVE | Facility: OTHER | Age: 79
End: 2024-10-02
Payer: MEDICARE

## 2024-10-03 ENCOUNTER — PATIENT MESSAGE (OUTPATIENT)
Dept: ADMINISTRATIVE | Facility: OTHER | Age: 79
End: 2024-10-03
Payer: MEDICARE

## 2024-10-04 ENCOUNTER — PATIENT MESSAGE (OUTPATIENT)
Dept: ADMINISTRATIVE | Facility: OTHER | Age: 79
End: 2024-10-04
Payer: MEDICARE

## 2024-10-05 ENCOUNTER — PATIENT MESSAGE (OUTPATIENT)
Dept: ADMINISTRATIVE | Facility: OTHER | Age: 79
End: 2024-10-05
Payer: MEDICARE

## 2024-10-06 ENCOUNTER — PATIENT MESSAGE (OUTPATIENT)
Dept: ADMINISTRATIVE | Facility: OTHER | Age: 79
End: 2024-10-06
Payer: MEDICARE

## 2024-10-07 ENCOUNTER — PATIENT MESSAGE (OUTPATIENT)
Dept: ADMINISTRATIVE | Facility: OTHER | Age: 79
End: 2024-10-07
Payer: MEDICARE

## 2024-10-07 ENCOUNTER — HOSPITAL ENCOUNTER (OUTPATIENT)
Dept: RADIOLOGY | Facility: HOSPITAL | Age: 79
Discharge: HOME | End: 2024-10-07
Attending: RADIOLOGY
Payer: MEDICARE

## 2024-10-07 DIAGNOSIS — C79.31 SECONDARY MALIGNANT NEOPLASM OF BRAIN: ICD-10-CM

## 2024-10-07 PROCEDURE — 25500020 PHARM REV CODE 255: Performed by: RADIOLOGY

## 2024-10-07 PROCEDURE — 70553 MRI BRAIN STEM W/O & W/DYE: CPT | Mod: 26,,, | Performed by: RADIOLOGY

## 2024-10-07 PROCEDURE — 70553 MRI BRAIN STEM W/O & W/DYE: CPT | Mod: TC

## 2024-10-07 PROCEDURE — A9585 GADOBUTROL INJECTION: HCPCS | Performed by: RADIOLOGY

## 2024-10-07 RX ORDER — GADOBUTROL 604.72 MG/ML
9 INJECTION INTRAVENOUS
Status: COMPLETED | OUTPATIENT
Start: 2024-10-07 | End: 2024-10-07

## 2024-10-07 RX ADMIN — GADOBUTROL 9 ML: 604.72 INJECTION INTRAVENOUS at 10:10

## 2024-10-08 ENCOUNTER — OFFICE VISIT (OUTPATIENT)
Dept: NEUROSURGERY | Facility: CLINIC | Age: 79
End: 2024-10-08
Payer: MEDICARE

## 2024-10-08 ENCOUNTER — OFFICE VISIT (OUTPATIENT)
Dept: RADIATION ONCOLOGY | Facility: CLINIC | Age: 79
End: 2024-10-08
Payer: MEDICARE

## 2024-10-08 ENCOUNTER — PATIENT MESSAGE (OUTPATIENT)
Dept: ADMINISTRATIVE | Facility: OTHER | Age: 79
End: 2024-10-08
Payer: MEDICARE

## 2024-10-08 VITALS
HEIGHT: 69 IN | HEART RATE: 98 BPM | TEMPERATURE: 97 F | SYSTOLIC BLOOD PRESSURE: 102 MMHG | OXYGEN SATURATION: 96 % | WEIGHT: 182.75 LBS | DIASTOLIC BLOOD PRESSURE: 64 MMHG | BODY MASS INDEX: 27.07 KG/M2

## 2024-10-08 DIAGNOSIS — C34.92 ADENOCARCINOMA, LUNG, LEFT: Primary | ICD-10-CM

## 2024-10-08 DIAGNOSIS — C79.31 SECONDARY MALIGNANT NEOPLASM OF BRAIN: ICD-10-CM

## 2024-10-08 DIAGNOSIS — C79.31 SECONDARY MALIGNANT NEOPLASM OF BRAIN: Primary | ICD-10-CM

## 2024-10-08 PROCEDURE — 99214 OFFICE O/P EST MOD 30 MIN: CPT | Mod: PBBFAC,27 | Performed by: RADIOLOGY

## 2024-10-08 PROCEDURE — 99214 OFFICE O/P EST MOD 30 MIN: CPT | Mod: S$PBB,,, | Performed by: RADIOLOGY

## 2024-10-08 PROCEDURE — 77263 THER RADIOLOGY TX PLNG CPLX: CPT | Mod: ,,, | Performed by: RADIOLOGY

## 2024-10-08 PROCEDURE — 99212 OFFICE O/P EST SF 10 MIN: CPT | Mod: PBBFAC | Performed by: NEUROLOGICAL SURGERY

## 2024-10-08 PROCEDURE — 99214 OFFICE O/P EST MOD 30 MIN: CPT | Mod: 24,S$PBB,, | Performed by: NEUROLOGICAL SURGERY

## 2024-10-08 PROCEDURE — 99999 PR PBB SHADOW E&M-EST. PATIENT-LVL IV: CPT | Mod: PBBFAC,,, | Performed by: RADIOLOGY

## 2024-10-08 PROCEDURE — 99999 PR PBB SHADOW E&M-EST. PATIENT-LVL II: CPT | Mod: PBBFAC,,, | Performed by: NEUROLOGICAL SURGERY

## 2024-10-08 NOTE — PROGRESS NOTES
10/8/2024    Radiation Oncology Follow-Up Visit      Prior Radiation History:   Course 1:    Site  Energy  Dose/Fx (Gy)  #Fx  Total Dose (Gy)  Start Date  End Date  Elapsed Days    Left hilum/lung  6X  2  30 / 30  60  5/8/2024 6/18/2024  41      Course 2: GK SRT 8/2/24 - 8/7/24  Target # Site Dose per Fraction (Gy) Cumulative Dose % Isodose Line Fraction # Total Fractions   1 Lt Thalamic 9 27 51 3 3     Is the patient female between ages 15-55:  No    Does the patient have a CIED:  No      Assessment   This is a 79 y.o. 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 Deng. Staging MRI Brain 4/3/24 demonstrated a 0.8 cm Left thalamic metastasis; this was observed. MRI Brain 7/26/24 demonstrated interval increase in size to 2.2 cm; no other evidence of intracranial disease. He completed GK SRT 27 Gy in 3 fx on 8/7/24. He returns for routine follow up of brain metastases.     MRI Brain 10/7/24 demonstrated interval decrease in size of Left thalamic metastasis but new 1.9 cm Right medial temporo-occipital metastasis.     I recommend treating the Right medial temporal metastasis 18-20 Gy x1 with Gamma Knife radiosurgery. I discussed potential side effects including short-term vasogenic edema and late radiation necrosis, which could result in neurologic deficits. At the end of our discussion, he was in agreement with proceeding with the recommended treatment.     Will reach out to Dr. Nuñez to discuss management of respiratory symptoms; PET/CT without significant pneumonitis, but possible it could have worsened in the interim.           Plan   1) Schedule Gamma Knife radiosurgery (frameless, 1 fraction), ideally this Friday 10/11/24.           CHIEF COMPLAINT: Brain metastasis    HPI/Focused ROS: No focal numbness, weakness, speech difficulty, or gait imbalance since treatment. In the past 2-3 weeks he reports worsening  dyspnea on exertion; no fevers or cough.         Past Medical History:   Diagnosis Date    Benign neoplasm of colon 10/01/2013    Bronchitis chronic     CAD (coronary artery disease) 10/31/2013    COPD (chronic obstructive pulmonary disease)     Emphysema of lung     GERD (gastroesophageal reflux disease)     Hx of colonic polyps     Hypertension     NAFLD (nonalcoholic fatty liver disease) 2017    SHOLA on CPAP     RLS (restless legs syndrome)     Screen for colon cancer 2013       Past Surgical History:   Procedure Laterality Date    bilateral foot surgery      CARDIAC CATHETERIZATION  2013    x1    CATARACT EXTRACTION, BILATERAL      COLON SURGERY      Ace Mott MD    COLONOSCOPY N/A 3/21/2018    Procedure: COLONOSCOPY;  Surgeon: Terry Mott MD;  Location: [facility] ENDO (4TH FLR);  Service: Endoscopy;  Laterality: N/A;    COLONOSCOPY N/A 2019    Procedure: COLONOSCOPY;  Surgeon: John Mantilla MD;  [facility]  Service: Endoscopy;  Laterality: N/A;    COLONOSCOPY N/A 2022    Procedure: COLONOSCOPY;  Surgeon: MEDINA Farris MD;  [facility]  Service: Endoscopy;  Laterality: N/A;  fully vacc-inst portal-tb    ENDOBRONCHIAL ULTRASOUND Bilateral 2024    Procedure: ENDOBRONCHIAL ULTRASOUND (EBUS);  Surgeon: Naveed Aguero MD;  [facility];  Service: Pulmonary;  Laterality: Bilateral;    scrotal abscess removal         Social History     Tobacco Use    Smoking status: Former     Current packs/day: 0.00     Average packs/day: 1 pack/day for 40.0 years (40.0 ttl pk-yrs)     Types: Cigarettes     Start date: 8/15/1972     Quit date: 8/15/2012     Years since quittin.1     Passive exposure: Never    Smokeless tobacco: Never   Substance Use Topics    Alcohol use: Yes     Alcohol/week: 3.0 standard drinks of alcohol     Types: 3 Cans of beer per week     Comment: maybe 2-3  beers weekly    Drug use: No       Cancer-related family  history is negative for Prostate cancer.    Current Outpatient Medications on File Prior to Visit   Medication Sig Dispense Refill    albuterol (ACCUNEB) 0.63 mg/3 mL Nebu Take 3 mLs (0.63 mg total) by nebulization every 6 (six) hours as needed (if symptoms failed to improve with inhaler). Rescue 375 mL 11    albuterol (PROVENTIL/VENTOLIN HFA) 90 mcg/actuation inhaler Inhale 2 puffs into the lungs every 4 (four) hours as needed for Wheezing. 8.5 g 11    amitriptyline (ELAVIL) 25 MG tablet Take 1 tablet (25 mg total) by mouth once daily. 90 tablet 3    aspirin (ECOTRIN) 81 MG EC tablet Take 81 mg by mouth once daily.       atorvastatin (LIPITOR) 80 MG tablet TAKE 1 TABLET (80 MG TOTAL) BY MOUTH ONCE DAILY. (Patient taking differently: every evening. TAKE 1 TABLET (80 MG TOTAL) BY MOUTH ONCE DAILY.) 90 tablet 3    BETA-CAROTENE,A, W-C & E/MIN (OCUVITE ORAL) Take 1 capsule by mouth once daily.       budesonide-glycopyr-formoterol (BREZTRI AEROSPHERE) 160-9-4.8 mcg/actuation HFAA Inhale 2 puffs into the lungs 2 (two) times a day. 10.7 g 3    echinacea 400 mg Cap Take 1 capsule by mouth once daily.       fluticasone propionate (FLONASE) 50 mcg/actuation nasal spray 2 sprays (100 mcg total) by Each Nostril route once daily. 16 g 11    latanoprost 0.005 % ophthalmic solution Place 1 drop into both eyes once daily.       latanoprost 0.005 % ophthalmic solution INSTILL 1 DROP INTO BOTH EYES AT BEDTIME 7.5 mL 3    losartan (COZAAR) 100 MG tablet Take 1 tablet (100 mg total) by mouth once daily. 90 tablet 2    mirtazapine (REMERON) 7.5 MG Tab Take 1 tablet (7.5 mg total) by mouth every evening. 30 tablet 0    nitroGLYCERIN (NITROSTAT) 0.4 MG SL tablet Place 1 tablet (0.4 mg total) under the tongue every 5 (five) minutes as needed. (Patient not taking: Reported on 9/26/2024) 100 tablet 3    omeprazole (PRILOSEC) 20 MG capsule Take 1 capsule (20 mg total) by mouth once daily. 90 capsule 3    ondansetron (ZOFRAN) 4 MG tablet Take  "1 tablet (4 mg total) by mouth every 8 (eight) hours as needed for Nausea. 30 tablet 1    potassium chloride (KLOR-CON) 20 mEq Pack Take 20 mEq (1 packet) twice a day by oral route for 4 days. (Patient not taking: Reported on 9/26/2024) 8 packet 0    roflumilast (DALIRESP) 500 mcg Tab Take 1 tablet (500 mcg total) by mouth once daily. 90 tablet 3    SENNA 8.6 mg tablet Take 2 tablets by mouth every evening. 60 tablet 0     Current Facility-Administered Medications on File Prior to Visit   Medication Dose Route Frequency Provider Last Rate Last Admin    dextrose 50% injection 12.5 g  12.5 g Intravenous PRN Lidia Deleon MD        dextrose 50% injection 25 g  25 g Intravenous PRN Lidia Deleon MD        insulin regular injection 0-8 Units  0-8 Units Intravenous Continuous PRN Lidia Deleon MD           Review of patient's allergies indicates:   Allergen Reactions    Brilinta [ticagrelor] Itching    Lisinopril      Other reaction(s): cough    Metoprolol succinate Rash         Vital Signs: /64 (BP Location: Left arm, Patient Position: Sitting)   Pulse 98   Temp 97.4 °F (36.3 °C)   Ht 5' 9" (1.753 m)   Wt 82.9 kg (182 lb 12.2 oz)   SpO2 96%   BMI 26.99 kg/m²     ECOG Performance Status: (2) Ambulatory and capable of self care, unable to carry out work activity, up and about > 50% or waking hours    Physical Exam  Constitutional:       Appearance: Normal appearance.   HENT:      Head: Normocephalic and atraumatic.   Eyes:      General: No scleral icterus.     Extraocular Movements: Extraocular movements intact.   Pulmonary:      Effort: No accessory muscle usage or respiratory distress.      Comments: On supplemental O2  Musculoskeletal:      Cervical back: Normal range of motion.   Neurological:      Mental Status: He is alert and oriented to person, place, and time.      Comments: In clinic supplied wheelchair   Psychiatric:         Mood and Affect: Mood and affect normal.         Judgment: " Judgment normal.          Labs:    Imaging: I have personally reviewed the patient's available images and reports and summarized pertinent findings above in HPI.     Pathology: No new path

## 2024-10-08 NOTE — PATIENT INSTRUCTIONS
I have personally reviewed the MRI Brain W WO Contrast with the pt which shows Interval decrease in size of the left thalamic lesion with improved edema and mass effect. New enhancing lesion at the right temporal occipital junction as above, presumed metastasis.     I recommend gamma knife (focused radiation) to the new enhancing lesion at the right temporal occipital junction. I have discussed the risks/benefits, indications, and alternatives for the proposed procedure in detail. I have answered all of their questions and patient wish to proceed with radiosurgery. We will schedule patient.    I will discuss with my partner, Dr. OSWALDO Henson (rad oncologist) for co-treatment planning.

## 2024-10-09 ENCOUNTER — PATIENT MESSAGE (OUTPATIENT)
Dept: PALLIATIVE MEDICINE | Facility: CLINIC | Age: 79
End: 2024-10-09
Payer: MEDICARE

## 2024-10-09 ENCOUNTER — PATIENT MESSAGE (OUTPATIENT)
Dept: CARDIOLOGY | Facility: CLINIC | Age: 79
End: 2024-10-09
Payer: MEDICARE

## 2024-10-09 ENCOUNTER — PATIENT MESSAGE (OUTPATIENT)
Dept: HEMATOLOGY/ONCOLOGY | Facility: CLINIC | Age: 79
End: 2024-10-09
Payer: MEDICARE

## 2024-10-09 ENCOUNTER — PATIENT MESSAGE (OUTPATIENT)
Dept: RADIATION ONCOLOGY | Facility: CLINIC | Age: 79
End: 2024-10-09
Payer: MEDICARE

## 2024-10-09 ENCOUNTER — PATIENT MESSAGE (OUTPATIENT)
Dept: ADMINISTRATIVE | Facility: OTHER | Age: 79
End: 2024-10-09
Payer: MEDICARE

## 2024-10-09 ENCOUNTER — PATIENT MESSAGE (OUTPATIENT)
Dept: RADIATION THERAPY | Facility: HOSPITAL | Age: 79
End: 2024-10-09
Payer: MEDICARE

## 2024-10-09 RX ORDER — TRAZODONE HYDROCHLORIDE 50 MG/1
50 TABLET ORAL NIGHTLY
Qty: 30 TABLET | Refills: 2 | Status: SHIPPED | OUTPATIENT
Start: 2024-10-09 | End: 2025-01-07

## 2024-10-10 ENCOUNTER — TELEPHONE (OUTPATIENT)
Dept: RADIATION THERAPY | Facility: HOSPITAL | Age: 79
End: 2024-10-10
Payer: MEDICARE

## 2024-10-10 ENCOUNTER — PATIENT MESSAGE (OUTPATIENT)
Dept: ADMINISTRATIVE | Facility: OTHER | Age: 79
End: 2024-10-10
Payer: MEDICARE

## 2024-10-10 PROCEDURE — 77470 SPECIAL RADIATION TREATMENT: CPT | Mod: 59,TC | Performed by: RADIOLOGY

## 2024-10-10 PROCEDURE — 77470 SPECIAL RADIATION TREATMENT: CPT | Mod: 26,59,, | Performed by: RADIOLOGY

## 2024-10-11 ENCOUNTER — PROCEDURE VISIT (OUTPATIENT)
Dept: RADIATION THERAPY | Facility: HOSPITAL | Age: 79
End: 2024-10-11
Attending: STUDENT IN AN ORGANIZED HEALTH CARE EDUCATION/TRAINING PROGRAM
Payer: MEDICARE

## 2024-10-11 ENCOUNTER — PATIENT MESSAGE (OUTPATIENT)
Dept: ADMINISTRATIVE | Facility: OTHER | Age: 79
End: 2024-10-11
Payer: MEDICARE

## 2024-10-11 DIAGNOSIS — C79.31 SECONDARY MALIGNANT NEOPLASM OF BRAIN: Primary | ICD-10-CM

## 2024-10-11 PROCEDURE — 77371 SRS MULTISOURCE: CPT | Performed by: RADIOLOGY

## 2024-10-11 PROCEDURE — 77295 3-D RADIOTHERAPY PLAN: CPT | Mod: TC | Performed by: RADIOLOGY

## 2024-10-11 PROCEDURE — 77334 RADIATION TREATMENT AID(S): CPT | Mod: TC | Performed by: RADIOLOGY

## 2024-10-11 PROCEDURE — 77295 3-D RADIOTHERAPY PLAN: CPT | Mod: 26,,, | Performed by: RADIOLOGY

## 2024-10-11 PROCEDURE — 77300 RADIATION THERAPY DOSE PLAN: CPT | Mod: 26,,, | Performed by: RADIOLOGY

## 2024-10-11 PROCEDURE — 77290 THER RAD SIMULAJ FIELD CPLX: CPT | Mod: TC | Performed by: RADIOLOGY

## 2024-10-11 PROCEDURE — 77370 RADIATION PHYSICS CONSULT: CPT | Performed by: RADIOLOGY

## 2024-10-11 PROCEDURE — 77300 RADIATION THERAPY DOSE PLAN: CPT | Mod: TC | Performed by: RADIOLOGY

## 2024-10-11 PROCEDURE — 77290 THER RAD SIMULAJ FIELD CPLX: CPT | Mod: 26,,, | Performed by: RADIOLOGY

## 2024-10-11 PROCEDURE — 77334 RADIATION TREATMENT AID(S): CPT | Mod: 26,,, | Performed by: RADIOLOGY

## 2024-10-11 RX ORDER — LORAZEPAM 1 MG/1
1 TABLET ORAL ONCE
Status: COMPLETED | OUTPATIENT
Start: 2024-10-11 | End: 2024-10-11

## 2024-10-11 RX ADMIN — LORAZEPAM 1 MG: 1 TABLET ORAL at 07:10

## 2024-10-11 NOTE — PROCEDURES
DATE:  10/11/2024  TIME TX STARTED:  TIME TX COMPLETED:  Jordin Allen Jr. was positioned on the table in a custom-shaped immobilization mask.  Cone-beam CT was performed for stereotactic reference, and the images were imported into the Gamma Knife planning station. Images were co-registered with pre-planning MRI, which was used to define the target volume(s) and organs at risk.   Under direct physician supervision, acceptable localization of target and normal tissue was achieved with image guidance.    We targeted the lesion(s) with the dose(s) prescribed above to the margin of (each) lesion.    Treatment was carried out without difficulty using automated positioning.  Upon completion of the Gamma Knife procedure, the immobilization mask was removed.    Discharge instruction and FU appointments give, verbalized understanding. In NAD upon leaving with family member driving.

## 2024-10-11 NOTE — PROCEDURES
Pt arrived with complaints of feeling anxious with a nervous stomach.  He states that he recently vomited x1 and has just taken a Zofran.  He asked if there was something that we could provide to calm him.  Dr Henson aware and provided an order.  See MAR.

## 2024-10-11 NOTE — PROCEDURES
GAMMA KNIFE TREATMENT SUMMARY     NAME OF PATIENT:              Jordin Allen      DATE:                                     10/11/2024     Neurosurgeon:                      Steven Campos M.D., Ph.D.     Radiation Oncologist:           Bienvenido Henson M.D.     DIAGNOSIS:                             Adenocarcinoma of the lungs.  Brain metastasis.                                                                                                Operative Procedure:    Planning of gamma radiosurgery.  Delivery of gamma radiosurgery.       Indications in Detail:    Ms. Jordin Allen Jr. is a 79 y.o. gentleman with h/o HTN, SHOLA, COPD, adenocarcinoma of the lungs  who recently presented to the ED with a 3 day history of hypotension and dizziness s/p radiosurgery done on 8/2/24. He received 27Gy to the 51% isodose line delivered in three total fractions at the left thalamic tumor site.  MRI Brain W WO Contrast 10/7/2024: Interval decrease in size of the left thalamic lesion with improved edema and mass effect. New enhancing lesion at the right temporal occipital junction as above, presumed metastasis.  I recommend gamma knife (focused radiation) to the new enhancing lesion at the right temporal occipital junction. I have discussed the risks/benefits, indications, and alternatives for the proposed procedure in detail. I have answered all of their questions and patient wish to proceed with radiosurgery.      Procedure in Detail:  The patient was seen in the pretreatment area and the risks, benefits, and alternatives were discussed. The patient understood and wished to proceed. Dr. Campos, Dr. Henson and Dr. Johnson planned the radiosurgery based on a recent MRI that had been performed.  A single target was identified: Rt Temporal.  The tumors were targeted using Gamma Plan (Lightning planned).  The treatment consisted of 20 shots using various collimators.  Dr. Henson prescribed a dose of 20 Gy to the 56% isodose line. The  patient had a mask that was made in used to immobilize him during the treatment.  The patient was immobilized using this mask.  The cone beam CT and plan were coregistered.  The patient was placed in the unit and the shots were delivered sequentially.  The patient was placed in the unit and the shots were delivered sequentially.  The total beam on-time was 31.8 minutes.  The patient tolerated the treatment well.      COMPLICATIONS:  None.  ESTIMATED BLOOD LOSS:  None.  DISPOSITION:  The patient is to followup in the [facility] clinic.           Dr. Steven Campos MD, Ph.D.

## 2024-10-11 NOTE — PROCEDURES
Patient: Jordin Allen Jr.    MRN: [id number]    : 1945    DATE OF PROCEDURE: 10/11/2024      PRE-OPERATIVE DIAGNOSIS:  Brain metastasis       POST-OPERATIVE DIAGNOSIS:  Same         PROCEDURE PERFORMED:                 1)         Gamma Knife stereotactic radiosurgery to brain metastasis.       RADIATION ONCOLOGIST: Bienvenido Henson      NEUROSURGEON:  TIMUR Neurosurgeon: Steven Campos       MEDICAL PHYSICIST: TIMUR Medical Physicist: Colton Johnson       PROCEDURE SUMMARY:    Target # Site Dose per Fraction (Gy) Cumulative Dose % Isodose Line Total Fractions   1 C2 Rt Temporal 20 20 56 1       INDICATIONS AND CONSENT:  Jordin Allen Jr. is a 79 y.o. male with his is a 79 y.o. male with Stage IV NSCLC (cT3 cN1 M1b) adeno diagnosed on EBUS 24 s/p definitive chemo-RT to 60 Gy in 30 fx completed 24 with Dr. Deng. Staging MRI Brain 4/3/24 demonstrated a 0.8 cm Left thalamic metastasis; this was observed. MRI Brain 24 demonstrated interval increase in size to 2.2 cm; no other evidence of intracranial disease. He completed GK SRT 27 Gy in 3 fx on 24. He returns for routine follow up of brain metastases. MRI Brain 10/7/24 demonstrated interval decrease in size of Left thalamic metastasis but new 1.9 cm Right medial temporo-occipital metastasis. It was recommended that he undergo Gamma Knife stereotactic radiosurgery.  The risks of this procedure as well as possible complications were discussed with the patient pre-operatively.       DESCRIPTION OF PROCEDURE:  On the day of the procedure, the patient was positioned on the table in a custom-shaped immobilization mask.  Cone-beam CT was performed for stereotactic reference, and the images were imported into the Gamma Knife planning station. Images were co-registered with pre-planning MRI, which was used to define the target volume(s) and organs at risk. Under direct physician supervision, acceptable localization of target and normal tissue was achieved  with image guidance.  We targeted the lesion(s) with the dose(s) prescribed above to the margin of (each) lesion.  Treatment was carried out without difficulty using automated positioning.  Upon completion of the Gamma Knife procedure, the immobilization mask was removed.

## 2024-10-11 NOTE — PATIENT INSTRUCTIONS
After Gamma Knife Treatment    You may return to your normal activities as you feel up to doing them usually 1-2 days after treatment.    Do not drive the day of your treatment.    Feeling tired is normal after therapy: Rest as needed, eat healthy and drink plenty fluids     Some patients get headaches from wearing the mask:  you can take Tylenol or Advil when you need to     When should I call the doctor?    Call your doctor right away if you have any of the following:   Seizures   A persistent severe headache: may be caused by swelling in the treated area of your brain. We often treat with a medication called Decadron to reduce the swelling    Vision changes    Nausea and/or vomiting    Numbness in your face, arms and/or legs   Weakness  Loss of balance       What follow-up care will I have?       The results of the Gamma Knife treatment do not happen right away.   They may show up over a period of time. You will have scans (CT, MRI   or angiography) done again to measure the success of the treatment.     The first scan is normally done about 2- 3 months after the treatment.   How often these scans will be done depends on your diagnosis.     It is very important to keep your follow up appointments with the   radiation oncologist and neurosurgeon. You will be given information   with the date, time and location for these appointments.  Who do I call if I have questions?     If you have questions about your Gamma Knife treatment :     The [facility] Radiation Oncology Department- Gamma Knife Suite at [phone].     To reach the neurosurgeon in the evenings or weekends, call the   hospital  at [phone] and ask for the neurosurgeon on   call.

## 2024-10-12 ENCOUNTER — TELEPHONE (OUTPATIENT)
Dept: HEMATOLOGY/ONCOLOGY | Facility: CLINIC | Age: 79
End: 2024-10-12
Payer: MEDICARE

## 2024-10-12 ENCOUNTER — PATIENT MESSAGE (OUTPATIENT)
Dept: ADMINISTRATIVE | Facility: OTHER | Age: 79
End: 2024-10-12
Payer: MEDICARE

## 2024-10-12 DIAGNOSIS — N17.9 AKI (ACUTE KIDNEY INJURY): ICD-10-CM

## 2024-10-12 DIAGNOSIS — C34.12 ADENOCARCINOMA OF UPPER LOBE OF LEFT LUNG: Primary | ICD-10-CM

## 2024-10-12 DIAGNOSIS — R06.00 DYSPNEA, UNSPECIFIED TYPE: ICD-10-CM

## 2024-10-12 NOTE — TELEPHONE ENCOUNTER
Contacted patient about respiratory status.  He says he has some days that are good and some days are bad.  Overall, he feels it may be somewhat improved compared to our last visit.  On discussion with Dr. Henson, respiratory status remains worsened compared to previous.  Even though PET-CT did not demonstrate evidence of pneumonitis, patient's respiratory status worsened after this was obtained.  Therefore, will obtain CT chest now to rule out pneumonitis with very low threshold to start steroids.    Will also repeat BNP now to monitor renal function (noting a recent Cr of 1.7 w/ FENa of 0.3%, discussed increasing PO fluid intake).     Will request f/u and next cycle of durvalumab pending CT results.

## 2024-10-13 ENCOUNTER — PATIENT MESSAGE (OUTPATIENT)
Dept: ADMINISTRATIVE | Facility: OTHER | Age: 79
End: 2024-10-13
Payer: MEDICARE

## 2024-10-14 ENCOUNTER — PATIENT MESSAGE (OUTPATIENT)
Dept: ADMINISTRATIVE | Facility: OTHER | Age: 79
End: 2024-10-14
Payer: MEDICARE

## 2024-10-14 ENCOUNTER — TELEPHONE (OUTPATIENT)
Dept: HEMATOLOGY/ONCOLOGY | Facility: CLINIC | Age: 79
End: 2024-10-14
Payer: MEDICARE

## 2024-10-14 NOTE — TELEPHONE ENCOUNTER
"----- Message from Tara sent at 10/14/2024 10:46 AM CDT -----  Regarding: Scheduling Request          Patient Status:   Active      Scheduling Appt:   CT scan      Time/Date Preference:    sometime this week or next week per Dr. Nuñez's request      Relationship to Patient?:   Patient      Contact Preference?:   [phone]       Treating Provider:   Savannah HOLLY      Do you feel you need to be seen today? No      Additional Notes:   Pt received missed call from office informing them to schedule CT scan. Please put him down for a date. He'll check the portal for a notification and the appt details.      "Thank you for all that you do for our patients"  "

## 2024-10-15 ENCOUNTER — HOSPITAL ENCOUNTER (OUTPATIENT)
Dept: RADIOLOGY | Facility: HOSPITAL | Age: 79
Discharge: HOME | End: 2024-10-15
Attending: HOSPITALIST
Payer: MEDICARE

## 2024-10-15 ENCOUNTER — PATIENT MESSAGE (OUTPATIENT)
Dept: ADMINISTRATIVE | Facility: OTHER | Age: 79
End: 2024-10-15
Payer: MEDICARE

## 2024-10-15 DIAGNOSIS — R06.00 DYSPNEA, UNSPECIFIED TYPE: ICD-10-CM

## 2024-10-15 DIAGNOSIS — C34.12 ADENOCARCINOMA OF UPPER LOBE OF LEFT LUNG: ICD-10-CM

## 2024-10-15 PROCEDURE — 71250 CT THORAX DX C-: CPT | Mod: 26,,, | Performed by: RADIOLOGY

## 2024-10-15 PROCEDURE — 77336 RADIATION PHYSICS CONSULT: CPT | Performed by: RADIOLOGY

## 2024-10-15 PROCEDURE — 71250 CT THORAX DX C-: CPT | Mod: TC

## 2024-10-16 ENCOUNTER — TELEPHONE (OUTPATIENT)
Dept: HEMATOLOGY/ONCOLOGY | Facility: CLINIC | Age: 79
End: 2024-10-16
Payer: MEDICARE

## 2024-10-16 ENCOUNTER — PATIENT MESSAGE (OUTPATIENT)
Dept: ADMINISTRATIVE | Facility: OTHER | Age: 79
End: 2024-10-16
Payer: MEDICARE

## 2024-10-16 NOTE — TELEPHONE ENCOUNTER
"----- Message from Popeye sent at 10/16/2024  1:44 PM CDT -----  Consult/Advisory    Name Of Caller: Self    Contact Preference?: [phone]    Provider Name: Savannah    Does patient feel the need to be seen today? No    What is the nature of the call?: Returning call to office.    Additional Notes:  "Thank you for all that you do for our patients"  "

## 2024-10-16 NOTE — TELEPHONE ENCOUNTER
Called pt back. Pt is trying to find out who called him. When he calls the number back, it brings him to the main line and he gets transferred from person to person with no help. Pt does not have a voicemail box set up so it is unclear who the caller was. I informed the pt that it was not anyone from Dr. Nuñez's office and that there is no documentation in his chart about a call. Pt requests that in the future, please send him a message through the portal explaining the call so he can know who called and what it was for.

## 2024-10-17 ENCOUNTER — TELEPHONE (OUTPATIENT)
Dept: RADIATION THERAPY | Facility: HOSPITAL | Age: 79
End: 2024-10-17
Payer: MEDICARE

## 2024-10-17 ENCOUNTER — PATIENT MESSAGE (OUTPATIENT)
Dept: ADMINISTRATIVE | Facility: OTHER | Age: 79
End: 2024-10-17
Payer: MEDICARE

## 2024-10-17 NOTE — TELEPHONE ENCOUNTER
Spoke to Jordin Allen Jr. Who states had a headache for a couple of days in the morning when getting up, relieved with Advil

## 2024-10-18 ENCOUNTER — PATIENT MESSAGE (OUTPATIENT)
Dept: ADMINISTRATIVE | Facility: OTHER | Age: 79
End: 2024-10-18
Payer: MEDICARE

## 2024-10-19 ENCOUNTER — PATIENT MESSAGE (OUTPATIENT)
Dept: ADMINISTRATIVE | Facility: OTHER | Age: 79
End: 2024-10-19
Payer: MEDICARE

## 2024-10-20 ENCOUNTER — PATIENT MESSAGE (OUTPATIENT)
Dept: ADMINISTRATIVE | Facility: OTHER | Age: 79
End: 2024-10-20
Payer: MEDICARE

## 2024-10-21 ENCOUNTER — TELEPHONE (OUTPATIENT)
Dept: HEMATOLOGY/ONCOLOGY | Facility: CLINIC | Age: 79
End: 2024-10-21
Payer: MEDICARE

## 2024-10-21 ENCOUNTER — PATIENT MESSAGE (OUTPATIENT)
Dept: ADMINISTRATIVE | Facility: OTHER | Age: 79
End: 2024-10-21
Payer: MEDICARE

## 2024-10-22 ENCOUNTER — TELEPHONE (OUTPATIENT)
Dept: HEMATOLOGY/ONCOLOGY | Facility: CLINIC | Age: 79
End: 2024-10-22
Payer: MEDICARE

## 2024-10-22 NOTE — TELEPHONE ENCOUNTER
Spoke with pt to confirm tomorrow's appointments. Pt made aware that his infusion is scheduled for tomorrow at 2pm. Pt was previously wait listed. Pt verbalized understanding.

## 2024-10-23 ENCOUNTER — PATIENT MESSAGE (OUTPATIENT)
Dept: ADMINISTRATIVE | Facility: OTHER | Age: 79
End: 2024-10-23
Payer: MEDICARE

## 2024-10-23 ENCOUNTER — OFFICE VISIT (OUTPATIENT)
Dept: HEMATOLOGY/ONCOLOGY | Facility: CLINIC | Age: 79
End: 2024-10-23
Payer: MEDICARE

## 2024-10-23 ENCOUNTER — LAB VISIT (OUTPATIENT)
Dept: LAB | Facility: HOSPITAL | Age: 79
End: 2024-10-23
Attending: HOSPITALIST
Payer: MEDICARE

## 2024-10-23 VITALS
DIASTOLIC BLOOD PRESSURE: 80 MMHG | OXYGEN SATURATION: 98 % | TEMPERATURE: 98 F | HEART RATE: 53 BPM | WEIGHT: 181.69 LBS | RESPIRATION RATE: 22 BRPM | HEIGHT: 69 IN | SYSTOLIC BLOOD PRESSURE: 136 MMHG | BODY MASS INDEX: 26.91 KG/M2

## 2024-10-23 DIAGNOSIS — E87.6 HYPOKALEMIA DUE TO INADEQUATE POTASSIUM INTAKE: ICD-10-CM

## 2024-10-23 DIAGNOSIS — J06.9 UPPER RESPIRATORY TRACT INFECTION, UNSPECIFIED TYPE: ICD-10-CM

## 2024-10-23 DIAGNOSIS — C34.12 ADENOCARCINOMA OF UPPER LOBE OF LEFT LUNG: ICD-10-CM

## 2024-10-23 DIAGNOSIS — J70.0 RADIATION PNEUMONITIS: ICD-10-CM

## 2024-10-23 DIAGNOSIS — R63.0 DECREASED APPETITE: ICD-10-CM

## 2024-10-23 DIAGNOSIS — T45.1X5A CHEMOTHERAPY-INDUCED NAUSEA: ICD-10-CM

## 2024-10-23 DIAGNOSIS — C79.9 METASTATIC NEOPLASM: ICD-10-CM

## 2024-10-23 DIAGNOSIS — F43.22 ADJUSTMENT DISORDER WITH ANXIOUS MOOD: ICD-10-CM

## 2024-10-23 DIAGNOSIS — C34.92 ADENOCARCINOMA, LUNG, LEFT: Primary | ICD-10-CM

## 2024-10-23 DIAGNOSIS — G47.00 INSOMNIA, UNSPECIFIED TYPE: ICD-10-CM

## 2024-10-23 DIAGNOSIS — R11.0 CHEMOTHERAPY-INDUCED NAUSEA: ICD-10-CM

## 2024-10-23 DIAGNOSIS — Z79.52 LONG TERM (CURRENT) USE OF SYSTEMIC STEROIDS: ICD-10-CM

## 2024-10-23 LAB
ALBUMIN SERPL BCP-MCNC: 3.1 G/DL (ref 3.5–5.2)
ALP SERPL-CCNC: 108 U/L (ref 40–150)
ALT SERPL W/O P-5'-P-CCNC: 11 U/L (ref 10–44)
ANION GAP SERPL CALC-SCNC: 13 MMOL/L (ref 8–16)
AST SERPL-CCNC: 15 U/L (ref 10–40)
BILIRUB SERPL-MCNC: 1 MG/DL (ref 0.1–1)
BUN SERPL-MCNC: 8 MG/DL (ref 8–23)
CALCIUM SERPL-MCNC: 9.2 MG/DL (ref 8.7–10.5)
CHLORIDE SERPL-SCNC: 104 MMOL/L (ref 95–110)
CO2 SERPL-SCNC: 29 MMOL/L (ref 23–29)
CREAT SERPL-MCNC: 1.1 MG/DL (ref 0.5–1.4)
ERYTHROCYTE [DISTWIDTH] IN BLOOD BY AUTOMATED COUNT: 15.9 % (ref 11.5–14.5)
EST. GFR  (NO RACE VARIABLE): >60 ML/MIN/1.73 M^2
GLUCOSE SERPL-MCNC: 108 MG/DL (ref 70–110)
HCT VFR BLD AUTO: 34.7 % (ref 40–54)
HGB BLD-MCNC: 10.9 G/DL (ref 14–18)
IMM GRANULOCYTES # BLD AUTO: 0.03 K/UL (ref 0–0.04)
MCH RBC QN AUTO: 26.8 PG (ref 27–31)
MCHC RBC AUTO-ENTMCNC: 31.4 G/DL (ref 32–36)
MCV RBC AUTO: 86 FL (ref 82–98)
NEUTROPHILS # BLD AUTO: 6.1 K/UL (ref 1.8–7.7)
PLATELET # BLD AUTO: 139 K/UL (ref 150–450)
PMV BLD AUTO: 10 FL (ref 9.2–12.9)
POTASSIUM SERPL-SCNC: 3 MMOL/L (ref 3.5–5.1)
PROT SERPL-MCNC: 6.6 G/DL (ref 6–8.4)
RBC # BLD AUTO: 4.06 M/UL (ref 4.6–6.2)
SODIUM SERPL-SCNC: 146 MMOL/L (ref 136–145)
T4 FREE SERPL-MCNC: 1.2 NG/DL (ref 0.71–1.51)
TSH SERPL DL<=0.005 MIU/L-ACNC: 0.56 UIU/ML (ref 0.4–4)
WBC # BLD AUTO: 7.75 K/UL (ref 3.9–12.7)

## 2024-10-23 PROCEDURE — 84439 ASSAY OF FREE THYROXINE: CPT | Performed by: HOSPITALIST

## 2024-10-23 PROCEDURE — 99214 OFFICE O/P EST MOD 30 MIN: CPT | Mod: PBBFAC | Performed by: HOSPITALIST

## 2024-10-23 PROCEDURE — 36415 COLL VENOUS BLD VENIPUNCTURE: CPT | Performed by: HOSPITALIST

## 2024-10-23 PROCEDURE — G2211 COMPLEX E/M VISIT ADD ON: HCPCS | Mod: S$PBB,,, | Performed by: HOSPITALIST

## 2024-10-23 PROCEDURE — 99215 OFFICE O/P EST HI 40 MIN: CPT | Mod: S$PBB,,, | Performed by: HOSPITALIST

## 2024-10-23 PROCEDURE — 84443 ASSAY THYROID STIM HORMONE: CPT | Performed by: HOSPITALIST

## 2024-10-23 PROCEDURE — 85027 COMPLETE CBC AUTOMATED: CPT | Performed by: HOSPITALIST

## 2024-10-23 PROCEDURE — 99999 PR PBB SHADOW E&M-EST. PATIENT-LVL IV: CPT | Mod: PBBFAC,,, | Performed by: HOSPITALIST

## 2024-10-23 PROCEDURE — 80053 COMPREHEN METABOLIC PANEL: CPT | Performed by: HOSPITALIST

## 2024-10-23 RX ORDER — FLUTICASONE PROPIONATE 50 MCG
2 SPRAY, SUSPENSION (ML) NASAL DAILY
Qty: 16 G | Refills: 11 | Status: SHIPPED | OUTPATIENT
Start: 2024-10-23

## 2024-10-23 RX ORDER — SULFAMETHOXAZOLE AND TRIMETHOPRIM 800; 160 MG/1; MG/1
1 TABLET ORAL
Qty: 15 TABLET | Refills: 0 | Status: SHIPPED | OUTPATIENT
Start: 2024-10-23 | End: 2024-11-28

## 2024-10-23 RX ORDER — ONDANSETRON 4 MG/1
8 TABLET, ORALLY DISINTEGRATING ORAL EVERY 6 HOURS PRN
Qty: 120 TABLET | Refills: 0 | Status: SHIPPED | OUTPATIENT
Start: 2024-10-23 | End: 2024-11-22

## 2024-10-23 RX ORDER — POTASSIUM CHLORIDE 1.5 G/1.58G
POWDER, FOR SOLUTION ORAL
Qty: 8 PACKET | Refills: 0 | Status: SHIPPED | OUTPATIENT
Start: 2024-10-23 | End: 2024-10-25

## 2024-10-23 RX ORDER — PREDNISONE 20 MG/1
TABLET ORAL
Qty: 49 TABLET | Refills: 0 | Status: SHIPPED | OUTPATIENT
Start: 2024-10-24 | End: 2024-12-05

## 2024-10-23 NOTE — PROGRESS NOTES
[facility] MEDICAL ONCOLOGY - FOLLOW UP VISIT    Reason for visit: Adenocarcinoma of the SANJAY      Oncology History   Adenocarcinoma, lung, left   3/19/2024 Imaging Significant Findings    CTA PE (hemoptysis)  - 3.4 x 5.8 cm L hilar mass, probable invasion of distal L main bronchus and SANJAY bronchi     3/19/2024 - 3/22/2024 Hospital Admission    Presented with new onset hemoptysis.  Imaging demonstrated left hilar mass.  Bronchoscopy performed and confirmed adenocarcinoma, EGFR Exon 20 insertion.     3/21/2024 Procedure    Bronch w/ EBUS  SANJAY, Endobronchial Bx  - Adenocarcinoma (CK7 and TTF1 positive; CK20, p40, and CK 5/6 negative)    SANJAY, BAL  - Adenocarcinoma    TEMPUS NGS/PD-L1  - PD-L1 TPS: 35%  - EGFR exon 20 inframe insertion ([id number])  - TP53 misssense ([id number])  - Negative: EGFR, ALK, BRAF, KRAS, ROS1, RET, MET, ERBB2  - TMB 9.5 m/MB     4/3/2024 Imaging Significant Findings    MRI Brain  - 0.8 cm rim enhancing lesion in L thalamus, no significant mass effect      4/11/2024 Imaging Significant Findings    PET CT  - 4.8 x 2.3 cm  L hilar mass, SUV 14  - Adjacent pulmonary nodules, e.g. 1.2 cm  - No intrathoracic LAD     4/12/2024 Cancer Staged    Staging form: Lung, AJCC 8th Edition  - Clinical stage from 4/12/2024: Stage STEF (cT3, cN1, cM1b)     4/30/2024 Procedure    Bronch with EBUS  RUL, EBBx  - Adenocarcinoma (TTF-1 positive, p40 negative)    LN  - Adenocarcinoma    Procedure Note  - Station for L was normal-sized not sampled; no other lymph nodes were visible by EBUS, including station 7, 4R, and 11R     5/8/2024 - 6/18/2024 Radiation Therapy    Treatment Summary  Course: C1 Lung 2024  Treatment Site Energy Dose/Fx (Gy) #Fx Total Dose (Gy) Start Date End Date Elapsed Days   Left hilum/lung 6X 2 30 / 30 60 5/8/2024 6/18/2024 41        5/9/2024 - 6/18/2024 Chemotherapy    Carboplatin/Paclitaxel, Concurrent w/ Radiation Therapy  Weekly  - Carboplatin AUC 2  -  Paclitaxel 45 mg/m2     6/27/2024 Imaging Significant Findings    CT C/A/P  - 3.2 x 3.1 cm left hilar mass, previously 3.7 cm  - Previously seen 1.2 cm nodule adjacent to primary mass not seen on this exam  - Near complete resolution of multiple prior nodular opacities  - Stable RUL micronodule     7/18/2024 -  Chemotherapy    Treatment Summary   Plan Name: OP DURVALUMAB 1500 MG Q4W  Treatment Goal: Curative  Status: Active  Start Date: 7/18/2024  End Date: 7/3/2025 (Planned)  Provider: Isacc Nuñez IV, MD  Chemotherapy: [No matching medication found in this treatment plan]     7/26/2024 Imaging Significant Findings    MRI Brain  - 2.2 cm peripherally enhancing cystic lesion of left thalamus, previously 0.8 cm.  Smaller surrounding edema signal, localized mass effect with slight effacement of the adjacent lateral ventricle, no hydrocephalus or midline shift.     8/2/2024 - 8/7/2024 Radiation Therapy    Treating physician: Bienvenido Henson  GK SRT  Target # Site Dose per Fraction (Gy) Cumulative Dose % Isodose Line Fraction # Total Fractions   1 Lt Thalamic 9 27 51 3 3          9/18/2024 Imaging Significant Findings    PET CT  4.9 x 2.4 cm left hilar mass (previously 4.8 x 2.3 cm), SUV 7.3 (previously 14)  0.5 cm satellite pulmonary SANJAY nodule, previously 1.2 cm  1 0.0 x 0.9 cm new satellite hypermetabolic pulmonary nodule in the anterior segment of SANJAY, SUV 9.4, new, radiation change vs progression  Development of patchy solid and ground-glass opacities with interlobular wall thickening in posterior segment of SANJAY, radiation change vs progression     10/7/2024 Imaging Significant Findings    MRI Brain  1.9 x 1.7 cm right cerebellar hemisphere irregular enhancing lesion, new from prior.  Moderate surrounding vasogenic edema spanning 5 cm  1.5 x 1.0 cm dorsal left thalamic lesion, decreased from 2.2 x 1.7 cm     10/11/2024 - 10/11/2024 Radiation Therapy    Treating physician: Bienvenido Henson  Gamma Knife SRS  Target  # Site Dose per Fraction (Gy) Cumulative Dose % Isodose Line Total Fractions   1 C2 Rt Temporal 20 20 56 1        10/15/2024 Imaging Significant Findings    CT C, Non-Con  Interval worsening of left-sided pleural pleural in the Cristina process likely representing radiation pneumonitis  Interval development of soft tissue density nodules in both lungs of unclear etiology (new 0.6 cm lesion in RUL; new 1.2 cm lesion in LLL)        Oncology History    HPI:     Jordin Allen Jr. is a 78 y.o. male with pmh significant for restless leg syndrome, COPD, CAD s/p stent (2013), HTN, HLD, obesity, GERD, and hearing loss requiring hearing aids who presents for follow up of his lung cancer     1) Stage IV (G2T0W6h) adenocarcinoma of the L hilum (PD-L1 35%, EGFR exon 20 insertion) with mets to the brain, initially dx'd 3/21/24 as stage IIIA disease, completed CRT (5/8/24-6/18/24) with weekly carboplatin/paclitaxel (5/9/24-6/18/24), currently on consolidation durvalumab (7/18/24 - present), w/ thalamic met proven radiographically (7/26/24) s/p GK (27 Gy/3 Fx, 8/2/24-8/7/24), and R temporal met proven radiographically (10/7/24) s/p GK (20 Gy/1Fx, 10/11/24)     Last clinic 9/26/24, worsening dyspnea, PET-CT without obvious pneumonitis on 9/18/24.  Held durvalumab with a plan for short interval re-evaluation in low threshold for repeat CT imaging of chest. PET and CT chest demonstrated left sided pneumonitis likely related to radiation.     Interval History:  Since last visit on 10/11/24, he underwent gamma knife as MRI brain showed new enhancing lesion in right temporal occipital junction and interval decrease in size of left thalamic lesion. Since last visit, he continues to be fatigued and feeling SOB. He is dyspneic requiring O2 every time he gets up and moves. This has slowly worsened since last visit. His appetite remains poor. He denies pain.    ROS:   As per HPI.     Physical Exam:       /80 (BP Location: Right arm,  "Patient Position: Sitting)   Pulse (!) 53   Temp 98.2 °F (36.8 °C) (Oral)   Resp (!) 22   Ht 5' 9" (1.753 m)   Wt 82.4 kg (181 lb 10.5 oz)   SpO2 98%   BMI 26.83 kg/m²                Physical Exam  Constitutional:       Appearance: Normal appearance.      Comments: Appears fatigued   HENT:      Head: Normocephalic and atraumatic.   Eyes:      Extraocular Movements: Extraocular movements intact.      Conjunctiva/sclera: Conjunctivae normal.      Pupils: Pupils are equal, round, and reactive to light.   Cardiovascular:      Rate and Rhythm: Normal rate and regular rhythm.      Heart sounds: No murmur heard.     No friction rub. No gallop.   Pulmonary:      Effort: Pulmonary effort is normal.      Breath sounds: No wheezing, rhonchi or rales.   Musculoskeletal:         General: Normal range of motion.      Right lower leg: No edema.      Left lower leg: No edema.   Skin:     General: Skin is warm and dry.   Neurological:      Mental Status: He is alert and oriented to person, place, and time.   Psychiatric:         Mood and Affect: Mood normal.         Thought Content: Thought content normal.         Judgment: Judgment normal.           Labs:   Recent Results (from the past 48 hours)   Comprehensive Metabolic Panel    Collection Time: 10/23/24  9:39 AM   Result Value Ref Range    Sodium 146 (H) 136 - 145 mmol/L    Potassium 3.0 (L) 3.5 - 5.1 mmol/L    Chloride 104 95 - 110 mmol/L    CO2 29 23 - 29 mmol/L    Glucose 108 70 - 110 mg/dL    BUN 8 8 - 23 mg/dL    Creatinine 1.1 0.5 - 1.4 mg/dL    Calcium 9.2 8.7 - 10.5 mg/dL    Total Protein 6.6 6.0 - 8.4 g/dL    Albumin 3.1 (L) 3.5 - 5.2 g/dL    Total Bilirubin 1.0 0.1 - 1.0 mg/dL    Alkaline Phosphatase 108 40 - 150 U/L    AST 15 10 - 40 U/L    ALT 11 10 - 44 U/L    eGFR >60.0 >60 mL/min/1.73 m^2    Anion Gap 13 8 - 16 mmol/L   CBC Oncology    Collection Time: 10/23/24  9:39 AM   Result Value Ref Range    WBC 7.75 3.90 - 12.70 K/uL    RBC 4.06 (L) 4.60 - 6.20 M/uL "    Hemoglobin 10.9 (L) 14.0 - 18.0 g/dL    Hematocrit 34.7 (L) 40.0 - 54.0 %    MCV 86 82 - 98 fL    MCH 26.8 (L) 27.0 - 31.0 pg    MCHC 31.4 (L) 32.0 - 36.0 g/dL    RDW 15.9 (H) 11.5 - 14.5 %    Platelets 139 (L) 150 - 450 K/uL    MPV 10.0 9.2 - 12.9 fL    Gran # (ANC) 6.1 1.8 - 7.7 K/uL    Immature Grans (Abs) 0.03 0.00 - 0.04 K/uL   TSH    Collection Time: 10/23/24  9:39 AM   Result Value Ref Range    TSH 0.565 0.400 - 4.000 uIU/mL   T4, Free    Collection Time: 10/23/24  9:39 AM   Result Value Ref Range    Free T4 1.20 0.71 - 1.51 ng/dL         Imaging:    See oncologic history above.     Path:  See oncologic history above.      Assessment and Plan:     Jordin Allen Jr. is a 78 y.o. male with pmh significant for restless leg syndrome, COPD, CAD s/p stent (2013), HTN, HLD, obesity, GERD, hearing loss requiring hearing aids, and Stage IIIA (T3N1M0) adenocarcinoma of the L hilum (PD-L1 35%, EGFR exon 20 insertion), initially dx'd 3/21/24, completed CRT (5/8/24-6/18/24) with weekly carboplatin/paclitaxel (5/9/24-6/18/24), currently on consolidation durvalumab (7/18/24 - present), w/ thalamic met proven radiographically (7/26/24) s/p GK (27 Gy/3 Fx, 8/2/24-8/7/24), who presents for follow-up.     Stage IIIA Adenocarcinoma of L Hilum, EGFR Exon 20 insertion, PD-L1 35%  Dyspnea on Exertion  ECOG PS 2-3 (limited by fatigue, weakness, nausea). The patient is currently on durvalumab consolidation, with multiple ongoing symptoms which are likely multifactorial (decreased appetite, nausea, fatigue, diarrhea; see below).  He also notes significant worsening of his dyspnea on exertion over the past approximate 1 week.  Most recent imaging (PET-CT, 9/18/24, on durvalumab consolidation) demonstrates a relatively stable left hilar mass with decreased SUV (4.9 x 2.4 cm with SUV 7.3, previously 4.8 x 2.3 cm, SUV 14), decreased size of a SANJAY satellite pulmonary nodule (0.5 cm from 1.2 cm) new satellite hypermetabolic pulmonary  nodule in the anterior SANJAY (potentially radiation change), and patchy solid and ground-glass opacities in this posterior SANJAY (likely radiation change).  While this imaging does not have overt signs of pneumonitis, the worsening dyspnea over the past week is somewhat concerning for this.  Had a long conversation with the patient regarding risk of flaring potential pneumonitis and the option to hold current treatment.  He endorses desire to hold treatment at this time with short interval re-evaluation of symptoms and low threshold for repeat CT imaging of the chest. We have previously discussed indications to message / present to the ED, including worsening respiratory status.      PLAN:   Continue to hold durvalumab today due to symptoms as above.  Imaging showed left sided pneumonitis likely related to radiation, however, imaging does not completely explain severity of SOB. As patient with worsening SOB, will trial prednisone (dose 40mg with taper every 2 weeks discussed with Rad Onc). Patient already on omeprazole. Sent bactrim for PJP prophylaxis as patient will be on long course of steroids.  Short interval symptom follow up in 3 weeks  CT chest after trial of steroids and consider resuming durvalumab at that time.      Brain Met  0.8 cm rim enhancing lesion in L thalamus on initial imaging. On initial review with radiology, low likelihood of malignancy and instead more likely cystic lesion, so pt treated w/ curative intent therapy for intrathoracic disease with plan for surveillance. Unfortunately, repeat MRI brain on 7/26/24 demonstrated growth to 2.2 cm. Pt now s/p GK to this solitary lesion, continuing to treat as oligometastatic disease as above.   MRI brain showed new enhancing lesion in right temporal occipital junction and interval decrease in size of left thalamic lesion   Following with Rad Onc and Neurosurgery, received gamma knife on 10/11/24.      ZACH  Creatinine improved to 1.1  Continue to  monitor      Decreased Appetite  Nausea  Pt endorses a significantly decreased appetite. He has lost 9 lbs over ~3 weeks, and is about 20 lbs down from his pre-treatment weight. Likely multifactorial, including recent CRT, GK to CNS met, and diarrheal illness. Currently on mirtazapine to minimal effect. Declines f/u with onc nutrition, stating that he has heard the recommendations before. Attempted dronabinol, however unable to procure this due to shortage and so instead started on Periactin. Low threshold for short course of steroids. Nausea of unclear etiology, perhaps related to CNS disease though less likely s/p treatment.   Continue periactin  Refill of ondansetron sent, transitioned to dissolving tabs per patient preference  Steroids as above  Palliative care f/u       Fatigue  Likely multifactorial, including completion of CRT, GK to CNS met, diarrheal illness, and decreased PO intake.  Treating contributory symptoms as above  Trial of steroids as above  Continue to follow with palliative care      The above information has been reviewed with the patient and all questions have been answered to their apparent satisfaction.  They understand that they can call the clinic with any questions.    Discussed with Dr. Nuñez.    Caren Shah MD   Hematology and Oncology Fellow, PGY VI       Route Chart for Scheduling    Med Onc Chart Routing      Follow up with physician 3 weeks. on 11/13   Follow up with JARAD    Infusion scheduling note    Injection scheduling note    Labs CBC, CMP and TSH   Scheduling:  Preferred lab:  Lab interval:  CBC, CMP, TSH, free T4 in 3 weeks prior to visit   Imaging    Pharmacy appointment    Other referrals

## 2024-10-23 NOTE — Clinical Note
Good evening Dr. Landry,   This patient had some questions about age-appropriate vaccinations. From an oncology standpoint, he is good to receive anything which is indicated.   Are you or your team able to reach out to him about this?   Thanks! Jai

## 2024-10-23 NOTE — PROGRESS NOTES
Jordin Allen JrGreg is a 78 y.o. 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  Held durvalumab with a plan for short interval re-evaluation in low threshold for repeat CT imaging of chest.    Interval History:  10/7/24:  MRI brain, new enhancing lesion in right temporal occipital junction; interval decrease in size of left thalamic lesion  10/8/24:  Neurosurgery and radiation oncology appointments, plan for gamma knife radiosurgery to do lesion  10/11/24: GKRS, 20 Gy/1 Fx      PLAN:   Studies  K+ 3.0  Imaging  ***  Treatment  ***  RTC   ***

## 2024-10-24 ENCOUNTER — PATIENT MESSAGE (OUTPATIENT)
Dept: HEMATOLOGY/ONCOLOGY | Facility: CLINIC | Age: 79
End: 2024-10-24
Payer: MEDICARE

## 2024-10-24 ENCOUNTER — TELEPHONE (OUTPATIENT)
Dept: INTERNAL MEDICINE | Facility: CLINIC | Age: 79
End: 2024-10-24
Payer: MEDICARE

## 2024-10-24 ENCOUNTER — TELEPHONE (OUTPATIENT)
Dept: HEMATOLOGY/ONCOLOGY | Facility: CLINIC | Age: 79
End: 2024-10-24
Payer: MEDICARE

## 2024-10-24 ENCOUNTER — PATIENT MESSAGE (OUTPATIENT)
Dept: ADMINISTRATIVE | Facility: OTHER | Age: 79
End: 2024-10-24
Payer: MEDICARE

## 2024-10-24 RX ORDER — MIRTAZAPINE 7.5 MG/1
7.5 TABLET, FILM COATED ORAL NIGHTLY
Qty: 30 TABLET | Refills: 0 | Status: SHIPPED | OUTPATIENT
Start: 2024-10-24 | End: 2024-11-23

## 2024-10-24 NOTE — TELEPHONE ENCOUNTER
----- Message from Isacc Nuñez MD sent at 10/23/2024  5:09 PM CDT -----  Good evening Dr. Landry,     This patient had some questions about age-appropriate vaccinations. From an oncology standpoint, he is good to receive anything which is indicated.     Are you or your team able to reach out to him about this?     Thanks!  Jai

## 2024-10-25 ENCOUNTER — PATIENT MESSAGE (OUTPATIENT)
Dept: HEMATOLOGY/ONCOLOGY | Facility: CLINIC | Age: 79
End: 2024-10-25
Payer: MEDICARE

## 2024-10-25 ENCOUNTER — PATIENT MESSAGE (OUTPATIENT)
Dept: ADMINISTRATIVE | Facility: OTHER | Age: 79
End: 2024-10-25
Payer: MEDICARE

## 2024-10-25 DIAGNOSIS — E87.6 HYPOKALEMIA DUE TO INADEQUATE POTASSIUM INTAKE: Primary | ICD-10-CM

## 2024-10-25 RX ORDER — POTASSIUM CHLORIDE 20 MEQ/1
40 TABLET, EXTENDED RELEASE ORAL DAILY
Qty: 6 TABLET | Refills: 0 | Status: SHIPPED | OUTPATIENT
Start: 2024-10-25 | End: 2024-10-28

## 2024-10-25 NOTE — PROGRESS NOTES
Potassium chloride tabs 40 mEq x3 days (pt took 1 pack of potassium and requested a change to tabs due to taste).

## 2024-10-26 ENCOUNTER — PATIENT MESSAGE (OUTPATIENT)
Dept: ADMINISTRATIVE | Facility: OTHER | Age: 79
End: 2024-10-26
Payer: MEDICARE

## 2024-10-27 ENCOUNTER — PATIENT MESSAGE (OUTPATIENT)
Dept: ADMINISTRATIVE | Facility: OTHER | Age: 79
End: 2024-10-27
Payer: MEDICARE

## 2024-10-28 ENCOUNTER — PATIENT MESSAGE (OUTPATIENT)
Dept: ADMINISTRATIVE | Facility: OTHER | Age: 79
End: 2024-10-28
Payer: MEDICARE

## 2024-10-28 RX ORDER — BUDESONIDE, GLYCOPYRROLATE, AND FORMOTEROL FUMARATE 160; 9; 4.8 UG/1; UG/1; UG/1
2 AEROSOL, METERED RESPIRATORY (INHALATION) 2 TIMES DAILY
Qty: 10.7 G | Refills: 3 | Status: SHIPPED | OUTPATIENT
Start: 2024-10-28

## 2024-10-29 ENCOUNTER — PATIENT MESSAGE (OUTPATIENT)
Dept: ADMINISTRATIVE | Facility: OTHER | Age: 79
End: 2024-10-29
Payer: MEDICARE

## 2024-10-30 ENCOUNTER — PATIENT MESSAGE (OUTPATIENT)
Dept: ADMINISTRATIVE | Facility: OTHER | Age: 79
End: 2024-10-30
Payer: MEDICARE

## 2024-10-31 ENCOUNTER — PATIENT MESSAGE (OUTPATIENT)
Dept: ADMINISTRATIVE | Facility: OTHER | Age: 79
End: 2024-10-31
Payer: MEDICARE

## 2024-10-31 ENCOUNTER — TELEPHONE (OUTPATIENT)
Dept: CARDIOLOGY | Facility: CLINIC | Age: 79
End: 2024-10-31
Payer: MEDICARE

## 2024-10-31 ENCOUNTER — EXTERNAL CHRONIC CARE MANAGEMENT (OUTPATIENT)
Dept: PRIMARY CARE CLINIC | Facility: CLINIC | Age: 79
End: 2024-10-31
Payer: MEDICARE

## 2024-10-31 PROCEDURE — 99490 CHRNC CARE MGMT STAFF 1ST 20: CPT | Mod: S$PBB,,, | Performed by: INTERNAL MEDICINE

## 2024-10-31 PROCEDURE — 99439 CHRNC CARE MGMT STAF EA ADDL: CPT | Mod: S$PBB,,, | Performed by: INTERNAL MEDICINE

## 2024-10-31 PROCEDURE — 99439 CHRNC CARE MGMT STAF EA ADDL: CPT | Mod: PBBFAC,PO | Performed by: INTERNAL MEDICINE

## 2024-10-31 PROCEDURE — 99490 CHRNC CARE MGMT STAFF 1ST 20: CPT | Mod: PBBFAC,PO | Performed by: INTERNAL MEDICINE

## 2024-11-01 ENCOUNTER — PATIENT MESSAGE (OUTPATIENT)
Dept: ADMINISTRATIVE | Facility: OTHER | Age: 79
End: 2024-11-01
Payer: MEDICARE

## 2024-11-02 ENCOUNTER — PATIENT MESSAGE (OUTPATIENT)
Dept: ADMINISTRATIVE | Facility: OTHER | Age: 79
End: 2024-11-02
Payer: MEDICARE

## 2024-11-03 ENCOUNTER — PATIENT MESSAGE (OUTPATIENT)
Dept: ADMINISTRATIVE | Facility: OTHER | Age: 79
End: 2024-11-03
Payer: MEDICARE

## 2024-11-04 ENCOUNTER — PATIENT MESSAGE (OUTPATIENT)
Dept: ADMINISTRATIVE | Facility: OTHER | Age: 79
End: 2024-11-04
Payer: MEDICARE

## 2024-11-05 ENCOUNTER — PATIENT MESSAGE (OUTPATIENT)
Dept: ADMINISTRATIVE | Facility: OTHER | Age: 79
End: 2024-11-05
Payer: MEDICARE

## 2024-11-06 ENCOUNTER — PATIENT MESSAGE (OUTPATIENT)
Dept: ADMINISTRATIVE | Facility: OTHER | Age: 79
End: 2024-11-06
Payer: MEDICARE

## 2024-11-07 ENCOUNTER — PATIENT MESSAGE (OUTPATIENT)
Dept: ADMINISTRATIVE | Facility: OTHER | Age: 79
End: 2024-11-07
Payer: MEDICARE

## 2024-11-08 ENCOUNTER — PATIENT MESSAGE (OUTPATIENT)
Dept: ADMINISTRATIVE | Facility: OTHER | Age: 79
End: 2024-11-08
Payer: MEDICARE

## 2024-11-09 ENCOUNTER — PATIENT MESSAGE (OUTPATIENT)
Dept: ADMINISTRATIVE | Facility: OTHER | Age: 79
End: 2024-11-09
Payer: MEDICARE

## 2024-11-10 ENCOUNTER — PATIENT MESSAGE (OUTPATIENT)
Dept: ADMINISTRATIVE | Facility: OTHER | Age: 79
End: 2024-11-10
Payer: MEDICARE

## 2024-11-11 ENCOUNTER — PATIENT MESSAGE (OUTPATIENT)
Dept: ADMINISTRATIVE | Facility: OTHER | Age: 79
End: 2024-11-11
Payer: MEDICARE

## 2024-11-12 ENCOUNTER — PATIENT MESSAGE (OUTPATIENT)
Dept: ADMINISTRATIVE | Facility: OTHER | Age: 79
End: 2024-11-12
Payer: MEDICARE

## 2024-11-13 ENCOUNTER — OFFICE VISIT (OUTPATIENT)
Dept: HEMATOLOGY/ONCOLOGY | Facility: CLINIC | Age: 79
End: 2024-11-13
Payer: MEDICARE

## 2024-11-13 ENCOUNTER — PATIENT MESSAGE (OUTPATIENT)
Dept: ADMINISTRATIVE | Facility: OTHER | Age: 79
End: 2024-11-13
Payer: MEDICARE

## 2024-11-13 ENCOUNTER — LAB VISIT (OUTPATIENT)
Dept: LAB | Facility: HOSPITAL | Age: 79
End: 2024-11-13
Attending: HOSPITALIST
Payer: MEDICARE

## 2024-11-13 VITALS
HEART RATE: 93 BPM | BODY MASS INDEX: 29.19 KG/M2 | SYSTOLIC BLOOD PRESSURE: 150 MMHG | WEIGHT: 197.06 LBS | DIASTOLIC BLOOD PRESSURE: 88 MMHG | OXYGEN SATURATION: 98 % | RESPIRATION RATE: 18 BRPM | TEMPERATURE: 98 F | HEIGHT: 69 IN

## 2024-11-13 DIAGNOSIS — C34.92 ADENOCARCINOMA, LUNG, LEFT: Primary | ICD-10-CM

## 2024-11-13 DIAGNOSIS — C79.31 SECONDARY MALIGNANT NEOPLASM OF BRAIN: ICD-10-CM

## 2024-11-13 DIAGNOSIS — R06.00 DYSPNEA, UNSPECIFIED TYPE: ICD-10-CM

## 2024-11-13 DIAGNOSIS — J70.0 RADIATION PNEUMONITIS: ICD-10-CM

## 2024-11-13 DIAGNOSIS — R53.83 FATIGUE DUE TO RADIATION THERAPY: ICD-10-CM
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
## 2024-11-13 DIAGNOSIS — C79.9 METASTATIC NEOPLASM: ICD-10-CM

## 2024-11-13 DIAGNOSIS — K59.00 CONSTIPATION, UNSPECIFIED CONSTIPATION TYPE: ICD-10-CM

## 2024-11-13 DIAGNOSIS — C34.12 ADENOCARCINOMA OF UPPER LOBE OF LEFT LUNG: ICD-10-CM

## 2024-11-13 DIAGNOSIS — R63.0 DECREASED APPETITE: ICD-10-CM

## 2024-11-13 DIAGNOSIS — C77.1 SECONDARY MALIGNANT NEOPLASM OF INTRATHORACIC LYMPH NODES: ICD-10-CM

## 2024-11-13 LAB
ALBUMIN SERPL BCP-MCNC: 3.3 G/DL (ref 3.5–5.2)
ALP SERPL-CCNC: 133 U/L (ref 40–150)
ALT SERPL W/O P-5'-P-CCNC: 53 U/L (ref 10–44)
ANION GAP SERPL CALC-SCNC: 8 MMOL/L (ref 8–16)
AST SERPL-CCNC: 31 U/L (ref 10–40)
BILIRUB SERPL-MCNC: 0.6 MG/DL (ref 0.1–1)
BUN SERPL-MCNC: 28 MG/DL (ref 8–23)
CALCIUM SERPL-MCNC: 9.5 MG/DL (ref 8.7–10.5)
CHLORIDE SERPL-SCNC: 108 MMOL/L (ref 95–110)
CO2 SERPL-SCNC: 27 MMOL/L (ref 23–29)
CREAT SERPL-MCNC: 1.2 MG/DL (ref 0.5–1.4)
ERYTHROCYTE [DISTWIDTH] IN BLOOD BY AUTOMATED COUNT: 20 % (ref 11.5–14.5)
EST. GFR  (NO RACE VARIABLE): >60 ML/MIN/1.73 M^2
GLUCOSE SERPL-MCNC: 73 MG/DL (ref 70–110)
HCT VFR BLD AUTO: 40.2 % (ref 40–54)
HGB BLD-MCNC: 12.2 G/DL (ref 14–18)
IMM GRANULOCYTES # BLD AUTO: 0.13 K/UL (ref 0–0.04)
MCH RBC QN AUTO: 27.3 PG (ref 27–31)
MCHC RBC AUTO-ENTMCNC: 30.3 G/DL (ref 32–36)
MCV RBC AUTO: 90 FL (ref 82–98)
NEUTROPHILS # BLD AUTO: 7.3 K/UL (ref 1.8–7.7)
PLATELET # BLD AUTO: 118 K/UL (ref 150–450)
PMV BLD AUTO: 10 FL (ref 9.2–12.9)
POTASSIUM SERPL-SCNC: 4.8 MMOL/L (ref 3.5–5.1)
PROT SERPL-MCNC: 6.2 G/DL (ref 6–8.4)
RBC # BLD AUTO: 4.47 M/UL (ref 4.6–6.2)
SODIUM SERPL-SCNC: 143 MMOL/L (ref 136–145)
T4 FREE SERPL-MCNC: 1.09 NG/DL (ref 0.71–1.51)
TSH SERPL DL<=0.005 MIU/L-ACNC: 1.98 UIU/ML (ref 0.4–4)
WBC # BLD AUTO: 9.04 K/UL (ref 3.9–12.7)

## 2024-11-13 PROCEDURE — 36415 COLL VENOUS BLD VENIPUNCTURE: CPT | Performed by: HOSPITALIST

## 2024-11-13 PROCEDURE — 85027 COMPLETE CBC AUTOMATED: CPT | Performed by: HOSPITALIST

## 2024-11-13 PROCEDURE — 84439 ASSAY OF FREE THYROXINE: CPT | Performed by: HOSPITALIST

## 2024-11-13 PROCEDURE — G2211 COMPLEX E/M VISIT ADD ON: HCPCS | Mod: S$PBB,,, | Performed by: HOSPITALIST

## 2024-11-13 PROCEDURE — 80053 COMPREHEN METABOLIC PANEL: CPT | Performed by: HOSPITALIST

## 2024-11-13 PROCEDURE — 99215 OFFICE O/P EST HI 40 MIN: CPT | Mod: S$PBB,,, | Performed by: HOSPITALIST

## 2024-11-13 PROCEDURE — 84443 ASSAY THYROID STIM HORMONE: CPT | Performed by: HOSPITALIST

## 2024-11-13 PROCEDURE — 99215 OFFICE O/P EST HI 40 MIN: CPT | Mod: PBBFAC | Performed by: HOSPITALIST

## 2024-11-13 PROCEDURE — 99999 PR PBB SHADOW E&M-EST. PATIENT-LVL V: CPT | Mod: PBBFAC,,, | Performed by: HOSPITALIST

## 2024-11-13 RX ORDER — SENNOSIDES 8.6 MG/1
1 TABLET ORAL DAILY
Qty: 60 TABLET | Refills: 2 | Status: SHIPPED | OUTPATIENT
Start: 2024-11-13 | End: 2025-05-12

## 2024-11-13 NOTE — PROGRESS NOTES
Last clinic 10/23/24, worsening fatigue and dyspnea with exertion.  Poor appetite.  Hold durvalumab.  Trial of prednisone with taper.  PJP prophylaxis sent, already on omeprazole.  RTC in 3 weeks for symptom follow up.  CT chest after trial steroids and consider resuming durvalumab at that time.  Continue Periactin for appetite.    Questions:   MONTIEL  Appetite  Fatigue    PLAN:   Studies  None  Imaging  CT C after steroids complete  Treatment  Consider durva after repeat imaging  RTC   At least 1 day after repeat imaging     Occupational Therapy    OT Treatment    Patient Name: Estelle Jung  MRN: [id number]  Today's Date: 1/7/2024  Time Calculation  Start Time: 1220  Stop Time: 1305  Time Calculation (min): 45 min         Assessment:  End of Session Communication: Bedside nurse  End of Session Patient Position: Up in chair, Alarm on       Plan:  OT Frequency: Daily  OT Discharge Recommendations: High intensity level of continued care     Subjective      01/07/24 1220   OT Last Visit   OT Received On 01/07/24   General   Prior to Session Communication Bedside nurse   Patient Position Received Up in chair   Preferred Learning Style visual;verbal   General Comment Pt very pleasant and cooperative   Pain Assessment   Pain Assessment 0-10   Pain Score 0 - No pain   Grooming   Grooming Level of Assistance Close supervision   Grooming Where Assessed Standing sinkside   Grooming Comments Pt performed grooming tasks standing at sink SBA, wash hands, face, brush teeth, comb hair.   Toileting   Toileting Level of Assistance Contact guard   Where Assessed Toilet   Toileting Comments Pt performed christine care at supported standing level with good brief management, CGA.    Transfers   Transfer Yes   Transfer 1   Transfer From 1 Sit to   Transfer to 1 Stand   Technique 1 Sit to stand;Stand to sit   Transfer Device 1 Walker;Gait belt   Transfer Level of Assistance 1 Contact guard;Minimum assistance   Trials/Comments 1 Pt completed multiple sit<>stands from chair and toilet CGA-Min A, cues for UE placement to push up from seated surface.   Dynamic Standing Balance   Dynamic Standing-Balance Support No upper extremity supported   Dynamic Standing-Balance Reaching for objects;Reaching for weighted objects;Reaching across midline   Toilet Transfers   Toilet Transfer From Chair   Toilet Transfer Type To and from   Toilet Transfer to Raised toilet seat with rails   Toilet Transfer Technique Ambulating   Toilet Transfers Minimal assistance   IP OT Assessment    End of Session Communication Bedside nurse   End of Session Patient Position Up in chair;Alarm on   Inpatient Plan   OT Frequency Daily   OT Discharge Recommendations High intensity level of continued care         Outcome Measures:[facility] Daily Activity  Putting on and taking off regular lower body clothing: A lot  Bathing (including washing, rinsing, drying): A lot  Putting on and taking off regular upper body clothing: A little  Toileting, which includes using toilet, bedpan or urinal: A little  Taking care of personal grooming such as brushing teeth: A little  Eating Meals: None  Daily Activity - Total Score: 17  Education Documentation  Body Mechanics, taught by ROME Singleton at 1/7/2024  1:26 PM.  Learner: Patient  Readiness: Acceptance  Method: Demonstration  Response: Demonstrated Understanding, Needs Reinforcement, Verbalizes Understanding    Precautions, taught by ROME Singleton at 1/7/2024  1:26 PM.  Learner: Patient  Readiness: Acceptance  Method: Demonstration  Response: Demonstrated Understanding, Needs Reinforcement, Verbalizes Understanding    ADL Training, taught by ROME Singleton at 1/7/2024  1:26 PM.  Learner: Patient  Readiness: Acceptance  Method: Demonstration  Response: Demonstrated Understanding, Needs Reinforcement, Verbalizes Understanding    Education Comments  No comments found.            Goals:  Encounter Problems       Encounter Problems (Active)       Balance       STG-Patient will be CGA with assistive device dynamic stand task >5 minutes for ADL completion   (Progressing)       Start:  01/03/24    Expected End:  01/17/24               Dressings Lower Extremities       STG - Patient will complete lower body dressing with min assist with AE and comp strategies  (Progressing)       Start:  01/03/24    Expected End:  01/17/24               Mobility       STG-Patient will be CGA with assistive device functional mobility tasks   (Progressing)        Start:  01/03/24    Expected End:  01/17/24               Transfers       STG-Patient will be CGA with functional transfers demonstrating good safety (Progressing)       Start:  01/03/24    Expected End:  01/17/24

## 2024-11-13 NOTE — PROGRESS NOTES
[facility] MEDICAL ONCOLOGY - FOLLOW UP VISIT    Reason for visit: Adenocarcinoma of the SANJAY      Oncology History   Adenocarcinoma, lung, left   3/19/2024 Imaging Significant Findings    CTA PE (hemoptysis)  - 3.4 x 5.8 cm L hilar mass, probable invasion of distal L main bronchus and SANJAY bronchi     3/19/2024 - 3/22/2024 Hospital Admission    Presented with new onset hemoptysis.  Imaging demonstrated left hilar mass.  Bronchoscopy performed and confirmed adenocarcinoma, EGFR Exon 20 insertion.     3/21/2024 Procedure    Bronch w/ EBUS  SANJAY, Endobronchial Bx  - Adenocarcinoma (CK7 and TTF1 positive; CK20, p40, and CK 5/6 negative)    SANJAY, BAL  - Adenocarcinoma    TEMPUS NGS/PD-L1  - PD-L1 TPS: 35%  - EGFR exon 20 inframe insertion ([id number])  - TP53 misssense ([id number])  - Negative: EGFR, ALK, BRAF, KRAS, ROS1, RET, MET, ERBB2  - TMB 9.5 m/MB     4/3/2024 Imaging Significant Findings    MRI Brain  - 0.8 cm rim enhancing lesion in L thalamus, no significant mass effect      4/11/2024 Imaging Significant Findings    PET CT  - 4.8 x 2.3 cm  L hilar mass, SUV 14  - Adjacent pulmonary nodules, e.g. 1.2 cm  - No intrathoracic LAD     4/12/2024 Cancer Staged    Staging form: Lung, AJCC 8th Edition  - Clinical stage from 4/12/2024: Stage STEF (cT3, cN1, cM1b)     4/30/2024 Procedure    Bronch with EBUS  RUL, EBBx  - Adenocarcinoma (TTF-1 positive, p40 negative)    LN  - Adenocarcinoma    Procedure Note  - Station for L was normal-sized not sampled; no other lymph nodes were visible by EBUS, including station 7, 4R, and 11R     5/8/2024 - 6/18/2024 Radiation Therapy    Treatment Summary  Course: C1 Lung 2024  Treatment Site Energy Dose/Fx (Gy) #Fx Total Dose (Gy) Start Date End Date Elapsed Days   Left hilum/lung 6X 2 30 / 30 60 5/8/2024 6/18/2024 41        5/9/2024 - 6/18/2024 Chemotherapy    Carboplatin/Paclitaxel, Concurrent w/ Radiation Therapy  Weekly  - Carboplatin AUC 2  -  Paclitaxel 45 mg/m2     6/27/2024 Imaging Significant Findings    CT C/A/P  - 3.2 x 3.1 cm left hilar mass, previously 3.7 cm  - Previously seen 1.2 cm nodule adjacent to primary mass not seen on this exam  - Near complete resolution of multiple prior nodular opacities  - Stable RUL micronodule     7/18/2024 -  Chemotherapy    Treatment Summary   Plan Name: OP DURVALUMAB 1500 MG Q4W  Treatment Goal: Curative  Status: Active  Start Date: 7/18/2024  End Date: 7/3/2025 (Planned)  Provider: Isacc Nuñez IV, MD  Chemotherapy: [No matching medication found in this treatment plan]     7/26/2024 Imaging Significant Findings    MRI Brain  - 2.2 cm peripherally enhancing cystic lesion of left thalamus, previously 0.8 cm.  Smaller surrounding edema signal, localized mass effect with slight effacement of the adjacent lateral ventricle, no hydrocephalus or midline shift.     8/2/2024 - 8/7/2024 Radiation Therapy    Treating physician: Bienvenido Henson  GK SRT  Target # Site Dose per Fraction (Gy) Cumulative Dose % Isodose Line Fraction # Total Fractions   1 Lt Thalamic 9 27 51 3 3          9/18/2024 Imaging Significant Findings    PET CT  4.9 x 2.4 cm left hilar mass (previously 4.8 x 2.3 cm), SUV 7.3 (previously 14)  0.5 cm satellite pulmonary SANJAY nodule, previously 1.2 cm  1 0.0 x 0.9 cm new satellite hypermetabolic pulmonary nodule in the anterior segment of SANJAY, SUV 9.4, new, radiation change vs progression  Development of patchy solid and ground-glass opacities with interlobular wall thickening in posterior segment of SANJAY, radiation change vs progression     10/7/2024 Imaging Significant Findings    MRI Brain  1.9 x 1.7 cm right cerebellar hemisphere irregular enhancing lesion, new from prior.  Moderate surrounding vasogenic edema spanning 5 cm  1.5 x 1.0 cm dorsal left thalamic lesion, decreased from 2.2 x 1.7 cm     10/11/2024 - 10/11/2024 Radiation Therapy    Treating physician: Bienvenido Henson  Gamma Knife SRS  Target  # Site Dose per Fraction (Gy) Cumulative Dose % Isodose Line Total Fractions   1 C2 Rt Temporal 20 20 56 1        10/15/2024 Imaging Significant Findings    CT C, Non-Con  Interval worsening of left-sided pleural pleural in the Cristina process likely representing radiation pneumonitis  Interval development of soft tissue density nodules in both lungs of unclear etiology (new 0.6 cm lesion in RUL; new 1.2 cm lesion in LLL)        Oncology History    HPI:     Jordin Allen Jr. is a 78 y.o. 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 Poor appetite. Hold durvalumab. Trial of prednisone with taper. PJP prophylaxis sent, already on omeprazole. RTC in 3 weeks for symptom follow up. CT chest after trial steroids and consider resuming durvalumab at that time. Continue Periactin for appetite.      Interval History:  Since last visit on 10/23/24, he was started on steroids for fatigue and SOB thought to be due to radiation pneumonitis. Since starting the steroids around 10/22/24, he took 40mg for 2 weeks and then dropped to 30mg for a week. He starts the 20mg today. Appetite is much improved - he has gained almost 20 lbs. He stopped taking the periactin. SOB has improved. He has not needed oxygen but keeps it with him in case. Otherwise, he noticed  "that his equilibrium is off, feels a little light-headedness when he tries to get up. The symptoms improve after moving around and are not present during rest. He denies pain.     ROS:   As per HPI.     Physical Exam:       BP (!) 150/88 (BP Location: Left arm, Patient Position: Sitting)   Pulse 93   Temp 97.6 °F (36.4 °C) (Oral)   Resp 18   Ht 5' 9" (1.753 m)   Wt 89.4 kg (197 lb 1.5 oz)   SpO2 98%   BMI 29.11 kg/m²                Physical Exam  Constitutional:       Appearance: Normal appearance.   HENT:      Head: Normocephalic and atraumatic.   Eyes:      Extraocular Movements: Extraocular movements intact.      Conjunctiva/sclera: Conjunctivae normal.      Pupils: Pupils are equal, round, and reactive to light.   Cardiovascular:      Rate and Rhythm: Normal rate and regular rhythm.      Heart sounds: No murmur heard.     No friction rub. No gallop.   Pulmonary:      Effort: Pulmonary effort is normal.      Breath sounds: Wheezing present. No rhonchi or rales.      Comments: Minor wheezing bilateral bases of lungs  Musculoskeletal:         General: Normal range of motion.      Right lower leg: No edema.      Left lower leg: No edema.   Skin:     General: Skin is warm and dry.   Neurological:      Mental Status: He is alert and oriented to person, place, and time.   Psychiatric:         Mood and Affect: Mood normal.         Thought Content: Thought content normal.         Judgment: Judgment normal.           Labs:   Recent Results (from the past 48 hours)   Comprehensive Metabolic Panel    Collection Time: 11/13/24  7:15 AM   Result Value Ref Range    Sodium 143 136 - 145 mmol/L    Potassium 4.8 3.5 - 5.1 mmol/L    Chloride 108 95 - 110 mmol/L    CO2 27 23 - 29 mmol/L    Glucose 73 70 - 110 mg/dL    BUN 28 (H) 8 - 23 mg/dL    Creatinine 1.2 0.5 - 1.4 mg/dL    Calcium 9.5 8.7 - 10.5 mg/dL    Total Protein 6.2 6.0 - 8.4 g/dL    Albumin 3.3 (L) 3.5 - 5.2 g/dL    Total Bilirubin 0.6 0.1 - 1.0 mg/dL    Alkaline " Phosphatase 133 40 - 150 U/L    AST 31 10 - 40 U/L    ALT 53 (H) 10 - 44 U/L    eGFR >60.0 >60 mL/min/1.73 m^2    Anion Gap 8 8 - 16 mmol/L   CBC Oncology    Collection Time: 11/13/24  7:15 AM   Result Value Ref Range    WBC 9.04 3.90 - 12.70 K/uL    RBC 4.47 (L) 4.60 - 6.20 M/uL    Hemoglobin 12.2 (L) 14.0 - 18.0 g/dL    Hematocrit 40.2 40.0 - 54.0 %    MCV 90 82 - 98 fL    MCH 27.3 27.0 - 31.0 pg    MCHC 30.3 (L) 32.0 - 36.0 g/dL    RDW 20.0 (H) 11.5 - 14.5 %    Platelets 118 (L) 150 - 450 K/uL    MPV 10.0 9.2 - 12.9 fL    Gran # (ANC) 7.3 1.8 - 7.7 K/uL    Immature Grans (Abs) 0.13 (H) 0.00 - 0.04 K/uL   TSH    Collection Time: 11/13/24  7:15 AM   Result Value Ref Range    TSH 1.984 0.400 - 4.000 uIU/mL   T4, Free    Collection Time: 11/13/24  7:15 AM   Result Value Ref Range    Free T4 1.09 0.71 - 1.51 ng/dL           Imaging:    See oncologic history above.     Path:  See oncologic history above.      Assessment and Plan:     Jordin Allen Jr. is a 78 y.o. male with pmh significant for restless leg syndrome, COPD, CAD s/p stent (2013), HTN, HLD, obesity, GERD, hearing loss requiring hearing aids, and Stage IIIA (T3N1M0) adenocarcinoma of the L hilum (PD-L1 35%, EGFR exon 20 insertion), initially dx'd 3/21/24, completed CRT (5/8/24-6/18/24) with weekly carboplatin/paclitaxel (5/9/24-6/18/24), currently on consolidation durvalumab (7/18/24 - present), w/ thalamic met proven radiographically (7/26/24) s/p GK (27 Gy/3 Fx, 8/2/24-8/7/24), who presents for follow-up.     Stage IIIA Adenocarcinoma of L Hilum, EGFR Exon 20 insertion, PD-L1 35%  Dyspnea on Exertion  ECOG PS 2-3 (limited by fatigue, weakness, nausea). The patient is currently on durvalumab consolidation, with multiple ongoing symptoms which are likely multifactorial (decreased appetite, nausea, fatigue, diarrhea; see below).  He also notes significant worsening of his dyspnea on exertion over the past approximate 1 week.  Most recent imaging (PET-CT,  9/18/24, on durvalumab consolidation) demonstrates a relatively stable left hilar mass with decreased SUV (4.9 x 2.4 cm with SUV 7.3, previously 4.8 x 2.3 cm, SUV 14), decreased size of a SANJAY satellite pulmonary nodule (0.5 cm from 1.2 cm) new satellite hypermetabolic pulmonary nodule in the anterior SANJAY (potentially radiation change), and patchy solid and ground-glass opacities in this posterior SANJAY (likely radiation change).  While this imaging does not have overt signs of pneumonitis, the worsening dyspnea over the past week is somewhat concerning for this.  Had a long conversation with the patient regarding risk of flaring potential pneumonitis and the option to hold current treatment.  He endorses desire to hold treatment at this time with short interval re-evaluation of symptoms and for repeat CT imaging of the chest after completion of trial of steroids. We have previously discussed indications to message / present to the ED, including worsening respiratory status.      PLAN:   Continue to hold durvalumab today due to symptoms as above.  Prior imaging showed left sided pneumonitis likely related to radiation, however, imaging does not completely explain severity of SOB. As patient with worsening SOB, began trial of prednisone (dose 40mg with taper every 2 weeks discussed with Rad Onc), which significantly improved symptoms of SOB. Patient already on omeprazole. Continue bactrim for PJP prophylaxis as long as patient is on steroids.  Follow up in 3 weeks with labs, CT chest after completion of steroids. Will consider resuming durvalumab at that time.      Brain Met  0.8 cm rim enhancing lesion in L thalamus on initial imaging. On initial review with radiology, low likelihood of malignancy and instead more likely cystic lesion, so pt treated w/ curative intent therapy for intrathoracic disease with plan for surveillance. Unfortunately, repeat MRI brain on 7/26/24 demonstrated growth to 2.2 cm. Pt now s/p GK to  this solitary lesion, continuing to treat as oligometastatic disease as above.   MRI brain showed new enhancing lesion in right temporal occipital junction and interval decrease in size of left thalamic lesion   Following with Rad Onc and Neurosurgery, received gamma knife on 10/11/24.      ZACH  Creatinine improved to 1.1  Continue to monitor      Decreased Appetite  Nausea  Pt previously endorsed a significantly decreased appetite. He had lost about 20 lbs down from his pre-treatment weight. Likely multifactorial, including recent CRT, GK to CNS met, and diarrheal illness. Currently on mirtazapine to minimal effect. Declines f/u with onc nutrition, stating that he has heard the recommendations before. Attempted dronabinol, however unable to procure this due to shortage and so instead started on Periactin. Nausea of unclear etiology, perhaps related to CNS disease though less likely s/p treatment.   Patient stopped periactin as steroids improved appetite.  Continue ondansetron as needed  Steroids as above  Palliative care f/u       Fatigue  Likely multifactorial, including completion of CRT, GK to CNS met, diarrheal illness, and decreased PO intake.  Treating contributory symptoms as above  Trial of steroids as above improved symptoms  Continue to follow with palliative care      The above information has been reviewed with the patient and all questions have been answered to their apparent satisfaction.  They understand that they can call the clinic with any questions.    Discussed with Dr. Nuñez.    Caren Shah MD   Hematology and Oncology Fellow, PGY VI       Route Chart for Scheduling    Med Onc Chart Routing      Follow up with physician 3 weeks.   Follow up with JARAD    Infusion scheduling note   Durvalumab in 3 weeks same day as follow up appt   Injection scheduling note    Labs CMP, CBC and TSH   Scheduling: Labs same day as infusion  Preferred lab:  Lab interval:  CBC, CMP, TSH in 3 weeks prior to appt    Imaging CT chest abdomen pelvis   CT chest 1-2 days prior to follow up appt   Pharmacy appointment    Other referrals

## 2024-11-14 ENCOUNTER — PATIENT MESSAGE (OUTPATIENT)
Dept: ADMINISTRATIVE | Facility: OTHER | Age: 79
End: 2024-11-14
Payer: MEDICARE

## 2024-11-15 ENCOUNTER — PATIENT MESSAGE (OUTPATIENT)
Dept: ADMINISTRATIVE | Facility: OTHER | Age: 79
End: 2024-11-15
Payer: MEDICARE

## 2024-11-15 ENCOUNTER — TELEPHONE (OUTPATIENT)
Dept: CARDIOLOGY | Facility: CLINIC | Age: 79
End: 2024-11-15
Payer: MEDICARE

## 2024-11-16 ENCOUNTER — PATIENT MESSAGE (OUTPATIENT)
Dept: ADMINISTRATIVE | Facility: OTHER | Age: 79
End: 2024-11-16
Payer: MEDICARE

## 2024-11-17 ENCOUNTER — PATIENT MESSAGE (OUTPATIENT)
Dept: ADMINISTRATIVE | Facility: OTHER | Age: 79
End: 2024-11-17
Payer: MEDICARE

## 2024-11-18 ENCOUNTER — PATIENT MESSAGE (OUTPATIENT)
Dept: ADMINISTRATIVE | Facility: OTHER | Age: 79
End: 2024-11-18
Payer: MEDICARE

## 2024-11-19 ENCOUNTER — HOSPITAL ENCOUNTER (OUTPATIENT)
Dept: CARDIOLOGY | Facility: HOSPITAL | Age: 79
Discharge: HOME | End: 2024-11-19
Attending: INTERNAL MEDICINE
Payer: MEDICARE

## 2024-11-19 ENCOUNTER — PATIENT MESSAGE (OUTPATIENT)
Dept: ADMINISTRATIVE | Facility: OTHER | Age: 79
End: 2024-11-19
Payer: MEDICARE

## 2024-11-19 VITALS
SYSTOLIC BLOOD PRESSURE: 132 MMHG | WEIGHT: 197 LBS | HEIGHT: 69 IN | RESPIRATION RATE: 16 BRPM | BODY MASS INDEX: 29.18 KG/M2 | HEART RATE: 86 BPM | DIASTOLIC BLOOD PRESSURE: 74 MMHG

## 2024-11-19 DIAGNOSIS — R06.09 DOE (DYSPNEA ON EXERTION): ICD-10-CM

## 2024-11-19 DIAGNOSIS — I25.10 CORONARY ARTERY DISEASE INVOLVING NATIVE CORONARY ARTERY OF NATIVE HEART WITHOUT ANGINA PECTORIS: Chronic | ICD-10-CM

## 2024-11-19 DIAGNOSIS — Z87.891 HISTORY OF SMOKING 30 OR MORE PACK YEARS: ICD-10-CM

## 2024-11-19 LAB
CFR FLOW - ANTERIOR: 2.03
CFR FLOW - INFERIOR: 2.2
CFR FLOW - LATERAL: 2.03
CFR FLOW - MAX: 2.41
CFR FLOW - MIN: 1.66
CFR FLOW - SEPTAL: 1.98
CFR FLOW - WHOLE HEART: 2.06
CV STRESS BASE HR: 95 BPM
DIASTOLIC BLOOD PRESSURE: 90 MMHG
EJECTION FRACTION- HIGH: 59 %
END DIASTOLIC INDEX-HIGH: 155 ML/M2
END DIASTOLIC INDEX-LOW: 91 ML/M2
END SYSTOLIC INDEX-HIGH: 78 ML/M2
END SYSTOLIC INDEX-LOW: 40 ML/M2
NUC REST DIASTOLIC VOLUME INDEX: 76
NUC REST EJECTION FRACTION: 70
NUC REST SYSTOLIC VOLUME INDEX: 23
NUC STRESS DIASTOLIC VOLUME INDEX: 90
NUC STRESS EJECTION FRACTION: 70 %
NUC STRESS SYSTOLIC VOLUME INDEX: 27
OHS CV CPX 1 MINUTE RECOVERY HEART RATE: 101 BPM
OHS CV CPX 85 PERCENT MAX PREDICTED HEART RATE MALE: 122
OHS CV CPX MAX PREDICTED HEART RATE: 143
OHS CV CPX PATIENT IS FEMALE: 0
OHS CV CPX PATIENT IS MALE: 1
OHS CV CPX PEAK DIASTOLIC BLOOD PRESSURE: 87 MMHG
OHS CV CPX PEAK HEAR RATE: 83 BPM
OHS CV CPX PEAK RATE PRESSURE PRODUCT: 9711
OHS CV CPX PEAK SYSTOLIC BLOOD PRESSURE: 117 MMHG
OHS CV CPX PERCENT MAX PREDICTED HEART RATE ACHIEVED: 58
OHS CV CPX RATE PRESSURE PRODUCT PRESENTING: NORMAL
OHS CV INITIAL DOSE: 27 MCG/KG/MIN
OHS CV MODERATELY REDUCED FLOW CAPACITY: 0 %
OHS CV MYOCARDIAL STEAL: 0 %
OHS CV NO ISCHEMIA MILDLY REDUCED FLOW CAPACTY: 3 %
OHS CV NO ISCHEMIA MINIMALLY REDUCED FLOW CAPACITY: 13 %
OHS CV NON-TRANSMURAL MYOCARDIAL INFARCTION SINGLE CONTINUOUS REGION: 0 %
OHS CV NORMAL FLOW CAPACITY COMPARABLE TO HEALTHY YOUNG VOLUNTEERS: 84 %
OHS CV PEAK DOSE: 26.7 MCG/KG/MIN
OHS CV PET ID: 7648
OHS CV PRE-DOMINANTLY MYOCARDIAL SCAR: 0 %
OHS CV SEVERELY REDUCED FLOW CAPACITY LARGEST SINGLE CONTINUOUS REGION: 0 %
OHS CV SEVERELY REDUCED FLOW CAPACITY: 0 %
OHS CV TOTAL EXAM DLP: 391.62 MGY-CM
REST FLOW - ANTERIOR: 1.4 CC/MIN/G
REST FLOW - INFERIOR: 1.09 CC/MIN/G
REST FLOW - LATERAL: 1.52 CC/MIN/G
REST FLOW - MAX: 2.07 CC/MIN/G
REST FLOW - MIN: 0.8 CC/MIN/G
REST FLOW - SEPTAL: 1.1 CC/MIN/G
REST FLOW - WHOLE HEART: 1.28 CC/MIN/G
RETIRED EF AND QEF - SEE NOTES: 47 %
STRESS FLOW - ANTERIOR: 2.86 CC/MIN/G
STRESS FLOW - INFERIOR: 2.43 CC/MIN/G
STRESS FLOW - LATERAL: 3.12 CC/MIN/G
STRESS FLOW - MAX: 3.61 CC/MIN/G
STRESS FLOW - MIN: 1.55 CC/MIN/G
STRESS FLOW - SEPTAL: 2.22 CC/MIN/G
STRESS FLOW - WHOLE HEART: 2.66 CC/MIN/G
SYSTOLIC BLOOD PRESSURE: 146 MMHG

## 2024-11-19 PROCEDURE — 63600175 PHARM REV CODE 636 W HCPCS: Performed by: INTERNAL MEDICINE

## 2024-11-19 PROCEDURE — 93017 CV STRESS TEST TRACING ONLY: CPT

## 2024-11-19 PROCEDURE — A9555 RB82 RUBIDIUM: HCPCS | Performed by: INTERNAL MEDICINE

## 2024-11-19 RX ORDER — REGADENOSON 0.08 MG/ML
0.4 INJECTION, SOLUTION INTRAVENOUS
Status: COMPLETED | OUTPATIENT
Start: 2024-11-19 | End: 2024-11-19

## 2024-11-19 RX ORDER — AMINOPHYLLINE 25 MG/ML
75 INJECTION, SOLUTION INTRAVENOUS
Status: COMPLETED | OUTPATIENT
Start: 2024-11-19 | End: 2024-11-19

## 2024-11-19 RX ADMIN — RUBIDIUM CHLORIDE RB-82 26.7 MILLICURIE: 150 INJECTION, SOLUTION INTRAVENOUS at 09:11

## 2024-11-19 RX ADMIN — REGADENOSON 0.4 MG: 0.08 INJECTION, SOLUTION INTRAVENOUS at 09:11

## 2024-11-19 RX ADMIN — RUBIDIUM CHLORIDE RB-82 27 MILLICURIE: 150 INJECTION, SOLUTION INTRAVENOUS at 09:11

## 2024-11-19 RX ADMIN — AMINOPHYLLINE 75 MG: 25 INJECTION, SOLUTION INTRAVENOUS at 09:11

## 2024-11-20 ENCOUNTER — PATIENT MESSAGE (OUTPATIENT)
Dept: ADMINISTRATIVE | Facility: OTHER | Age: 79
End: 2024-11-20
Payer: MEDICARE

## 2024-11-20 ENCOUNTER — PATIENT MESSAGE (OUTPATIENT)
Dept: CARDIOLOGY | Facility: CLINIC | Age: 79
End: 2024-11-20
Payer: MEDICARE

## 2024-11-21 ENCOUNTER — PATIENT MESSAGE (OUTPATIENT)
Dept: ADMINISTRATIVE | Facility: OTHER | Age: 79
End: 2024-11-21
Payer: MEDICARE

## 2024-11-22 ENCOUNTER — PATIENT MESSAGE (OUTPATIENT)
Dept: ADMINISTRATIVE | Facility: OTHER | Age: 79
End: 2024-11-22
Payer: MEDICARE

## 2024-11-23 ENCOUNTER — PATIENT MESSAGE (OUTPATIENT)
Dept: ADMINISTRATIVE | Facility: OTHER | Age: 79
End: 2024-11-23
Payer: MEDICARE

## 2024-11-24 ENCOUNTER — PATIENT MESSAGE (OUTPATIENT)
Dept: ADMINISTRATIVE | Facility: OTHER | Age: 79
End: 2024-11-24
Payer: MEDICARE

## 2024-11-25 ENCOUNTER — PATIENT MESSAGE (OUTPATIENT)
Dept: ADMINISTRATIVE | Facility: OTHER | Age: 79
End: 2024-11-25
Payer: MEDICARE

## 2024-11-27 ENCOUNTER — PATIENT MESSAGE (OUTPATIENT)
Dept: ADMINISTRATIVE | Facility: OTHER | Age: 79
End: 2024-11-27
Payer: MEDICARE

## 2024-11-28 ENCOUNTER — PATIENT MESSAGE (OUTPATIENT)
Dept: ADMINISTRATIVE | Facility: OTHER | Age: 79
End: 2024-11-28
Payer: MEDICARE

## 2024-11-29 ENCOUNTER — PATIENT MESSAGE (OUTPATIENT)
Dept: ADMINISTRATIVE | Facility: OTHER | Age: 79
End: 2024-11-29
Payer: MEDICARE

## 2024-11-30 ENCOUNTER — PATIENT MESSAGE (OUTPATIENT)
Dept: ADMINISTRATIVE | Facility: OTHER | Age: 79
End: 2024-11-30
Payer: MEDICARE

## 2024-11-30 ENCOUNTER — EXTERNAL CHRONIC CARE MANAGEMENT (OUTPATIENT)
Dept: PRIMARY CARE CLINIC | Facility: CLINIC | Age: 79
End: 2024-11-30
Payer: MEDICARE

## 2024-11-30 PROCEDURE — 99490 CHRNC CARE MGMT STAFF 1ST 20: CPT | Mod: S$PBB,,, | Performed by: INTERNAL MEDICINE

## 2024-11-30 PROCEDURE — 99490 CHRNC CARE MGMT STAFF 1ST 20: CPT | Mod: PBBFAC,PO | Performed by: INTERNAL MEDICINE

## 2024-12-01 ENCOUNTER — PATIENT MESSAGE (OUTPATIENT)
Dept: ADMINISTRATIVE | Facility: OTHER | Age: 79
End: 2024-12-01
Payer: MEDICARE

## 2024-12-02 ENCOUNTER — PATIENT MESSAGE (OUTPATIENT)
Dept: ADMINISTRATIVE | Facility: OTHER | Age: 79
End: 2024-12-02
Payer: MEDICARE

## 2024-12-02 DIAGNOSIS — G47.00 INSOMNIA, UNSPECIFIED TYPE: ICD-10-CM

## 2024-12-02 DIAGNOSIS — R63.0 DECREASED APPETITE: ICD-10-CM

## 2024-12-02 DIAGNOSIS — F43.22 ADJUSTMENT DISORDER WITH ANXIOUS MOOD: ICD-10-CM

## 2024-12-02 RX ORDER — MIRTAZAPINE 7.5 MG/1
7.5 TABLET, FILM COATED ORAL NIGHTLY
Qty: 30 TABLET | Refills: 0 | Status: SHIPPED | OUTPATIENT
Start: 2024-12-02 | End: 2025-01-04

## 2024-12-03 ENCOUNTER — PATIENT MESSAGE (OUTPATIENT)
Dept: ADMINISTRATIVE | Facility: OTHER | Age: 79
End: 2024-12-03
Payer: MEDICARE

## 2024-12-03 ENCOUNTER — HOSPITAL ENCOUNTER (OUTPATIENT)
Dept: RADIOLOGY | Facility: HOSPITAL | Age: 79
Discharge: HOME | End: 2024-12-03
Attending: STUDENT IN AN ORGANIZED HEALTH CARE EDUCATION/TRAINING PROGRAM
Payer: MEDICARE

## 2024-12-03 DIAGNOSIS — J70.0 RADIATION PNEUMONITIS: ICD-10-CM

## 2024-12-03 DIAGNOSIS — C34.92 ADENOCARCINOMA, LUNG, LEFT: ICD-10-CM

## 2024-12-03 PROCEDURE — 71250 CT THORAX DX C-: CPT | Mod: 26,,, | Performed by: RADIOLOGY

## 2024-12-03 PROCEDURE — 71250 CT THORAX DX C-: CPT | Mod: TC

## 2024-12-04 ENCOUNTER — PATIENT MESSAGE (OUTPATIENT)
Dept: ADMINISTRATIVE | Facility: OTHER | Age: 79
End: 2024-12-04
Payer: MEDICARE

## 2024-12-05 ENCOUNTER — PATIENT MESSAGE (OUTPATIENT)
Dept: ADMINISTRATIVE | Facility: OTHER | Age: 79
End: 2024-12-05
Payer: MEDICARE

## 2024-12-06 ENCOUNTER — PATIENT MESSAGE (OUTPATIENT)
Dept: HEMATOLOGY/ONCOLOGY | Facility: CLINIC | Age: 79
End: 2024-12-06

## 2024-12-06 ENCOUNTER — INFUSION (OUTPATIENT)
Dept: INFUSION THERAPY | Facility: HOSPITAL | Age: 79
End: 2024-12-06
Attending: HOSPITALIST
Payer: MEDICARE

## 2024-12-06 ENCOUNTER — OFFICE VISIT (OUTPATIENT)
Dept: HEMATOLOGY/ONCOLOGY | Facility: CLINIC | Age: 79
End: 2024-12-06
Payer: MEDICARE

## 2024-12-06 ENCOUNTER — TELEPHONE (OUTPATIENT)
Dept: HEMATOLOGY/ONCOLOGY | Facility: CLINIC | Age: 79
End: 2024-12-06

## 2024-12-06 VITALS
HEIGHT: 69 IN | RESPIRATION RATE: 18 BRPM | BODY MASS INDEX: 28.84 KG/M2 | TEMPERATURE: 98 F | WEIGHT: 194.69 LBS | DIASTOLIC BLOOD PRESSURE: 87 MMHG | SYSTOLIC BLOOD PRESSURE: 135 MMHG | HEART RATE: 85 BPM | OXYGEN SATURATION: 96 %

## 2024-12-06 VITALS
SYSTOLIC BLOOD PRESSURE: 130 MMHG | BODY MASS INDEX: 28.84 KG/M2 | HEART RATE: 91 BPM | WEIGHT: 194.69 LBS | DIASTOLIC BLOOD PRESSURE: 84 MMHG | OXYGEN SATURATION: 96 % | RESPIRATION RATE: 18 BRPM | TEMPERATURE: 98 F | HEIGHT: 69 IN

## 2024-12-06 DIAGNOSIS — R63.0 DECREASED APPETITE: ICD-10-CM

## 2024-12-06 DIAGNOSIS — J70.0 RADIATION PNEUMONITIS: ICD-10-CM

## 2024-12-06 DIAGNOSIS — R51.9 NEW ONSET OF HEADACHE IN CANCER PATIENT: ICD-10-CM
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
## 2024-12-06 DIAGNOSIS — C79.31 SECONDARY MALIGNANT NEOPLASM OF BRAIN: ICD-10-CM

## 2024-12-06 DIAGNOSIS — C34.92 ADENOCARCINOMA, LUNG, LEFT: Primary | ICD-10-CM

## 2024-12-06 DIAGNOSIS — C77.1 SECONDARY MALIGNANT NEOPLASM OF INTRATHORACIC LYMPH NODES: ICD-10-CM

## 2024-12-06 DIAGNOSIS — R53.83 FATIGUE DUE TO RADIATION THERAPY: ICD-10-CM

## 2024-12-06 PROCEDURE — 96413 CHEMO IV INFUSION 1 HR: CPT

## 2024-12-06 PROCEDURE — 99214 OFFICE O/P EST MOD 30 MIN: CPT | Mod: PBBFAC | Performed by: HOSPITALIST

## 2024-12-06 PROCEDURE — 25000003 PHARM REV CODE 250: Performed by: HOSPITALIST

## 2024-12-06 PROCEDURE — 99999 PR PBB SHADOW E&M-EST. PATIENT-LVL IV: CPT | Mod: PBBFAC,,, | Performed by: HOSPITALIST

## 2024-12-06 RX ORDER — DIPHENHYDRAMINE HYDROCHLORIDE 50 MG/ML
50 INJECTION INTRAMUSCULAR; INTRAVENOUS ONCE AS NEEDED
Status: DISCONTINUED | OUTPATIENT
Start: 2024-12-06 | End: 2024-12-06 | Stop reason: HOSPADM

## 2024-12-06 RX ORDER — SODIUM CHLORIDE 0.9 % (FLUSH) 0.9 %
10 SYRINGE (ML) INJECTION
Status: CANCELLED | OUTPATIENT
Start: 2024-12-06

## 2024-12-06 RX ORDER — DIPHENHYDRAMINE HYDROCHLORIDE 50 MG/ML
50 INJECTION INTRAMUSCULAR; INTRAVENOUS ONCE AS NEEDED
Status: CANCELLED | OUTPATIENT
Start: 2024-12-06

## 2024-12-06 RX ORDER — HEPARIN 100 UNIT/ML
500 SYRINGE INTRAVENOUS
Status: CANCELLED | OUTPATIENT
Start: 2024-12-06

## 2024-12-06 RX ORDER — EPINEPHRINE 0.3 MG/.3ML
0.3 INJECTION SUBCUTANEOUS ONCE AS NEEDED
Status: DISCONTINUED | OUTPATIENT
Start: 2024-12-06 | End: 2024-12-06 | Stop reason: HOSPADM

## 2024-12-06 RX ORDER — SODIUM CHLORIDE 0.9 % (FLUSH) 0.9 %
10 SYRINGE (ML) INJECTION
Status: DISCONTINUED | OUTPATIENT
Start: 2024-12-06 | End: 2024-12-06 | Stop reason: HOSPADM

## 2024-12-06 RX ORDER — EPINEPHRINE 0.3 MG/.3ML
0.3 INJECTION SUBCUTANEOUS ONCE AS NEEDED
Status: CANCELLED | OUTPATIENT
Start: 2024-12-06

## 2024-12-06 RX ORDER — HEPARIN 100 UNIT/ML
500 SYRINGE INTRAVENOUS
Status: DISCONTINUED | OUTPATIENT
Start: 2024-12-06 | End: 2024-12-06 | Stop reason: HOSPADM

## 2024-12-06 RX ADMIN — SODIUM CHLORIDE 1500 MG: 9 INJECTION, SOLUTION INTRAVENOUS at 09:12

## 2024-12-06 NOTE — PLAN OF CARE
1048-Pt tolerated Imfinzi well today, no complaints or complications. VSS. Pt aware to call provider with any questions or concerns and is aware of upcoming appts. Pt ambulatory from clinic with WC, no distress noted.

## 2024-12-06 NOTE — TELEPHONE ENCOUNTER
Spoke with pt to schedule STAT MRI brain per Dr. Nuñez. Pt scheduled for next available, 12/9. Pt agreeable to apt date / time. Message to be sent via portal for reminder per request.   07-Aug-2021 13:13

## 2024-12-06 NOTE — PROGRESS NOTES
[facility] MEDICAL ONCOLOGY - FOLLOW UP VISIT    Reason for visit: Adenocarcinoma of the SANJAY      Oncology History   Adenocarcinoma, lung, left   3/19/2024 Imaging Significant Findings    CTA PE (hemoptysis)  - 3.4 x 5.8 cm L hilar mass, probable invasion of distal L main bronchus and SANJAY bronchi     3/19/2024 - 3/22/2024 Hospital Admission    Presented with new onset hemoptysis.  Imaging demonstrated left hilar mass.  Bronchoscopy performed and confirmed adenocarcinoma, EGFR Exon 20 insertion.     3/21/2024 Procedure    Bronch w/ EBUS  SANJAY, Endobronchial Bx  - Adenocarcinoma (CK7 and TTF1 positive; CK20, p40, and CK 5/6 negative)    SANJAY, BAL  - Adenocarcinoma    TEMPUS NGS/PD-L1  - PD-L1 TPS: 35%  - EGFR exon 20 inframe insertion ([id number])  - TP53 misssense ([id number])  - Negative: EGFR, ALK, BRAF, KRAS, ROS1, RET, MET, ERBB2  - TMB 9.5 m/MB     4/3/2024 Imaging Significant Findings    MRI Brain  - 0.8 cm rim enhancing lesion in L thalamus, no significant mass effect      4/11/2024 Imaging Significant Findings    PET CT  - 4.8 x 2.3 cm  L hilar mass, SUV 14  - Adjacent pulmonary nodules, e.g. 1.2 cm  - No intrathoracic LAD     4/12/2024 Cancer Staged    Staging form: Lung, AJCC 8th Edition  - Clinical stage from 4/12/2024: Stage STEF (cT3, cN1, cM1b)     4/30/2024 Procedure    Bronch with EBUS  RUL, EBBx  - Adenocarcinoma (TTF-1 positive, p40 negative)    LN  - Adenocarcinoma    Procedure Note  - Station for L was normal-sized not sampled; no other lymph nodes were visible by EBUS, including station 7, 4R, and 11R     5/8/2024 - 6/18/2024 Radiation Therapy    Treatment Summary  Course: C1 Lung 2024  Treatment Site Energy Dose/Fx (Gy) #Fx Total Dose (Gy) Start Date End Date Elapsed Days   Left hilum/lung 6X 2 30 / 30 60 5/8/2024 6/18/2024 41        5/9/2024 - 6/18/2024 Chemotherapy    Carboplatin/Paclitaxel, Concurrent w/ Radiation Therapy  Weekly  - Carboplatin AUC 2  -  Paclitaxel 45 mg/m2     6/27/2024 Imaging Significant Findings    CT C/A/P  - 3.2 x 3.1 cm left hilar mass, previously 3.7 cm  - Previously seen 1.2 cm nodule adjacent to primary mass not seen on this exam  - Near complete resolution of multiple prior nodular opacities  - Stable RUL micronodule     7/18/2024 -  Chemotherapy    Treatment Summary   Plan Name: OP DURVALUMAB 1500 MG Q4W  Treatment Goal: Curative  Status: Active  Start Date: 7/18/2024  End Date: 7/3/2025 (Planned)  Provider: Isacc Nuñez IV, MD  Chemotherapy: [No matching medication found in this treatment plan]     7/26/2024 Imaging Significant Findings    MRI Brain  - 2.2 cm peripherally enhancing cystic lesion of left thalamus, previously 0.8 cm.  Smaller surrounding edema signal, localized mass effect with slight effacement of the adjacent lateral ventricle, no hydrocephalus or midline shift.     8/2/2024 - 8/7/2024 Radiation Therapy    Treating physician: Bienvenido Henson  GK SRT  Target # Site Dose per Fraction (Gy) Cumulative Dose % Isodose Line Fraction # Total Fractions   1 Lt Thalamic 9 27 51 3 3          9/18/2024 Imaging Significant Findings    PET CT  4.9 x 2.4 cm left hilar mass (previously 4.8 x 2.3 cm), SUV 7.3 (previously 14)  0.5 cm satellite pulmonary SANJAY nodule, previously 1.2 cm  1 0.0 x 0.9 cm new satellite hypermetabolic pulmonary nodule in the anterior segment of SANJAY, SUV 9.4, new, radiation change vs progression  Development of patchy solid and ground-glass opacities with interlobular wall thickening in posterior segment of SANJAY, radiation change vs progression     10/7/2024 Imaging Significant Findings    MRI Brain  1.9 x 1.7 cm right cerebellar hemisphere irregular enhancing lesion, new from prior.  Moderate surrounding vasogenic edema spanning 5 cm  1.5 x 1.0 cm dorsal left thalamic lesion, decreased from 2.2 x 1.7 cm     10/11/2024 - 10/11/2024 Radiation Therapy    Treating physician: Bienvenido Henson  Gamma Knife SRS  Target  # Site Dose per Fraction (Gy) Cumulative Dose % Isodose Line Total Fractions   1 C2 Rt Temporal 20 20 56 1        10/15/2024 Imaging Significant Findings    CT C, Non-Con  Interval worsening of left-sided pleural pulmonary process likely representing radiation pneumonitis   Interval development of soft tissue density nodules in both lungs of unclear etiology (new 0.6 cm lesion in RUL; new 1.2 cm lesion in LLL)     12/3/2024 Imaging Significant Findings    CT C  2.2 x 1.1 spiculated nodule in central LLL, new compared to 10/15/24, given location may represent radiation change of recurrent malignancy is not excluded  1.3 x 0.5 cm SANJAY nodule with concave margins unchanged  Interval resolution of irregular nodules in right upper and left lower lobes compared to 10/15/24        Oncology History    HPI:     Jordin Allen  is a 78 y.o. 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  CT C after completion of steroids and continue resuming durvalumab at that time.    Interval History:  Completed steroids  11/19/24: Cardiac PET can, negative for ischemia, normal wall motion at rest and during stress  12/3/24:  CT C, new LLL spiculated nodule of unclear  "etiology, apparently stable left hilar malignancy, resolution of previously described nodules in right upper and left lower lobes    The pt states "I'm feeling okay".     He says that, upon completing the steroid course, "I could tell". He confirms that his breathing remains improved off the steroids, though is not as good as when he was on the steroids. He says "but I'm okay". He says that he has only used his O2 "a couple of times". "As long as I'm just sitting down, or if I'm moving around the house, I'm fine".     He says that, over the past 3-4 days, he has been having headaches.  It is over the left lateral and posterior head, as well as in the medial orbit of his left eye. He says it comes in waves. He says "sometimes when I'm looking at television, right up in the left side, I'm seeing hexagons'; he says this lasts for seconds, has happened many times, and first started around 1 month ago (he is seeing an ophthalmologist next week). He denies any change in speech, unilateral weakness, or change in sensation. He denies pain in his neck. He took 2 ibuprofen yesterday which helped, and needed more this morning. He says that he is fine after taking the ibuprofen.    He says that his appetite "has been okay". He feels that he is back to his baseline appetite. He confirms that he is no longer using his periactin.     ROS:   As per HPI.     Physical Exam:       There were no vitals taken for this visit.               Physical Exam  Constitutional:       Appearance: Normal appearance.   HENT:      Head: Normocephalic and atraumatic.   Eyes:      Extraocular Movements: Extraocular movements intact.      Conjunctiva/sclera: Conjunctivae normal.      Pupils: Pupils are equal, round, and reactive to light.   Cardiovascular:      Rate and Rhythm: Normal rate and regular rhythm.      Heart sounds: No murmur heard.     No friction rub. No gallop.   Pulmonary:      Effort: Pulmonary effort is normal.      Breath sounds: No " wheezing, rhonchi or rales.   Musculoskeletal:         General: Normal range of motion.      Right lower leg: No edema.      Left lower leg: No edema.   Skin:     General: Skin is warm and dry.   Neurological:      Mental Status: He is alert and oriented to person, place, and time.   Psychiatric:         Mood and Affect: Mood normal.         Thought Content: Thought content normal.         Judgment: Judgment normal.           Labs:   No results found for this or any previous visit (from the past 48 hours).          Imaging:    See oncologic history above.     Path:  See oncologic history above.      Assessment and Plan:     Jordin Allen Jr. is a 78 y.o. 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  Patient received 2 cycles of consolidative durvalumab, subsequently held due to worsening shortness of breath.  A CT chest (10/15/24) demonstrated worsening of left-sided pulmonary process likely representing radiation pneumonitis as well as interval development of soft tissue density nodules in both lungs.  Durvalumab was held and patient was started on a course of steroids which he has since completed.   His respiratory status has improved significantly (no longer requiring O2, exercise tolerance increased), as have other symptoms including his appetite and energy level.  Repeat imaging (CT C, 12/3/24) demonstrates interval resolution of irregular nodules in the right upper and left lower lobes, apparent stability in the left hilar malignancy (noting this disease is difficult to visualize), and interval development of a 2.2 x 1.1 cm spiculated nodule in the central LLL.  Regarding the first 2 findings, as well as improvement in clinical status, favor that this represents improvement of likely radiation pneumonitis.  Regarding the last finding, unclear if this represents possible radiation change given location or recurrent malignancy (somewhat concerning given spiculated appearance).  Discussed at length with the patient, favor short interval imaging in 8 weeks for further evaluation (hold on biopsy this time, and PET-CT of likely you low utility given pneumonitis and recent radiation to this area).  In the interim, discussed  that given resolution of pneumonitis to < G1, it is appropriate to rechallenge with durvalumab at this time (noting this guidance is generally for immunotherapy induced pneumonitis rather than radiation induced pneumonitis).  Discussed potential risk for pneumonitis recurrence/worsening at length with the patient who endorses understanding and desire to proceed.       PLAN:   Resume durvalumab consolidation with C3 today (12/6/24), labs acceptable and treatment signed  Labs, RTC, and C4 durvalumab on 1/3/24  CT C around 1/28/24 for short interval follow up as above      Brain Met  L Sided Headache  0.8 cm rim enhancing lesion in L thalamus on initial imaging. On initial review with radiology, low likelihood of malignancy and instead more likely cystic lesion, so pt treated w/ curative intent therapy for intrathoracic disease with plan for surveillance. Unfortunately, repeat MRI brain on  7/26/24 demonstrated growth to 2.2 cm. Pt now s/p GK to this solitary lesion, continuing to treat as oligometastatic disease as above. Repeat MRI brain (10/7/24) demonstrated a new right cerebellar hemisphere hilar irregular enhancing lesion, for which patient underwent SRS on 10/11/24.  Today (12/6/24), he describes an ongoing left-sided headache over the past 4 days (see HPI for description), will obtain MRI imaging now to rule out new/worsening CNS disease.  MRI brain w/ and w/o ordered ASAP, message sent to schedule. Radiation oncology aware.  Previously scheduled MRI brain on 1/24/25  Rad onc f/u on 1/24/25       Decreased Appetite  Nausea  Pt previously endorsed a significantly decreased appetite. He had lost about 20 lbs down from his pre-treatment weight. Likely multifactorial, including recent CRT, GK to CNS met, and diarrheal illness. Currently on mirtazapine to minimal effect. Declines f/u with onc nutrition, stating that he has heard the recommendations before. Attempted dronabinol, however unable to procure this due to shortage and so instead started on Periactin. Nausea of unclear etiology, perhaps related to CNS disease though less likely s/p treatment. Significantly improved s/p course of steroids for pneumonitis, since off appetite stimulants.   Palliative care f/u       Fatigue  Likely multifactorial, including completion of CRT, GK to CNS met, diarrheal illness, and decreased PO intake. Significantly improved after course of steroids for pneumonitis.   Continue to follow with palliative care      The above information has been reviewed with the patient and all questions have been answered to their apparent satisfaction.  They understand that they can call the clinic with any questions.    Discussed with Dr. Nuñez.    Caren Shah MD   Hematology and Oncology Fellow, PGY VI       Route Chart for Scheduling    Med Onc Chart Routing      Follow up with physician . MRI brain now. Labs, RTC, and C4  durvalumab on 1/3/24   Follow up with [facility]AR[facility]    Infusion scheduling note    Injection scheduling note    Labs    Imaging    Pharmacy appointment    Other referrals

## 2024-12-08 ENCOUNTER — PATIENT MESSAGE (OUTPATIENT)
Dept: HEMATOLOGY/ONCOLOGY | Facility: CLINIC | Age: 79
End: 2024-12-08
Payer: MEDICARE

## 2024-12-08 ENCOUNTER — PATIENT MESSAGE (OUTPATIENT)
Dept: ADMINISTRATIVE | Facility: OTHER | Age: 79
End: 2024-12-08
Payer: MEDICARE

## 2024-12-09 ENCOUNTER — HOSPITAL ENCOUNTER (OUTPATIENT)
Dept: RADIOLOGY | Facility: HOSPITAL | Age: 79
Discharge: HOME | End: 2024-12-09
Attending: HOSPITALIST
Payer: MEDICARE

## 2024-12-09 ENCOUNTER — PATIENT MESSAGE (OUTPATIENT)
Dept: HEMATOLOGY/ONCOLOGY | Facility: CLINIC | Age: 79
End: 2024-12-09
Payer: MEDICARE

## 2024-12-09 ENCOUNTER — TELEPHONE (OUTPATIENT)
Dept: HEMATOLOGY/ONCOLOGY | Facility: CLINIC | Age: 79
End: 2024-12-09
Payer: MEDICARE

## 2024-12-09 ENCOUNTER — PATIENT MESSAGE (OUTPATIENT)
Dept: ADMINISTRATIVE | Facility: OTHER | Age: 79
End: 2024-12-09
Payer: MEDICARE

## 2024-12-09 DIAGNOSIS — C79.31 SECONDARY MALIGNANT NEOPLASM OF BRAIN: ICD-10-CM

## 2024-12-09 DIAGNOSIS — R51.9 NEW ONSET OF HEADACHE IN CANCER PATIENT: ICD-10-CM

## 2024-12-09 PROCEDURE — 70553 MRI BRAIN STEM W/O & W/DYE: CPT | Mod: 26,,, | Performed by: STUDENT IN AN ORGANIZED HEALTH CARE EDUCATION/TRAINING PROGRAM

## 2024-12-09 PROCEDURE — A9585 GADOBUTROL INJECTION: HCPCS | Performed by: HOSPITALIST

## 2024-12-09 PROCEDURE — 25500020 PHARM REV CODE 255: Performed by: HOSPITALIST

## 2024-12-09 PROCEDURE — 70553 MRI BRAIN STEM W/O & W/DYE: CPT | Mod: TC

## 2024-12-09 RX ORDER — GADOBUTROL 604.72 MG/ML
10 INJECTION INTRAVENOUS
Status: COMPLETED | OUTPATIENT
Start: 2024-12-09 | End: 2024-12-09

## 2024-12-09 RX ADMIN — GADOBUTROL 10 ML: 604.72 INJECTION INTRAVENOUS at 08:12

## 2024-12-09 NOTE — TELEPHONE ENCOUNTER
Attempted to contact patient regarding MRI brain results, which demonstrate decrease in size of the two primary lesions without evidence of new/worsening vasogenic edema, as well as a slight increase in conspicuity of a punctate focus of subcortical enhancement in the R temporal lobe. Overall, there are no findings to account for the patient's headache. Unable to contact the pt after multiple phone calls, and VM full. Message sent by patient portal.

## 2024-12-09 NOTE — TELEPHONE ENCOUNTER
Called pt to discuss scan results per Dr. Nuñez. No answer, unable to LVM due to full voicemail box. Portal message sent

## 2024-12-09 NOTE — TELEPHONE ENCOUNTER
----- Message from Sharyn sent at 12/9/2024 10:34 AM CST -----  Contact: Jordin  Type:  Patient Returning Call    Who Called:Jordin  Who Left Message for Patient:unknown  Does the patient know what this is regarding?:unknown  Would the patient rather a call back or a response via [facility]? call  Best Call Back Number:[phone]  Additional Information: Reports missing a call and request to receive another call back.  Thank you,  GH

## 2024-12-10 ENCOUNTER — OFFICE VISIT (OUTPATIENT)
Dept: CARDIOLOGY | Facility: CLINIC | Age: 79
End: 2024-12-10
Payer: MEDICARE

## 2024-12-10 ENCOUNTER — OFFICE VISIT (OUTPATIENT)
Dept: PULMONOLOGY | Facility: CLINIC | Age: 79
End: 2024-12-10
Payer: MEDICARE

## 2024-12-10 VITALS
OXYGEN SATURATION: 97 % | DIASTOLIC BLOOD PRESSURE: 70 MMHG | HEIGHT: 69 IN | HEART RATE: 95 BPM | SYSTOLIC BLOOD PRESSURE: 120 MMHG | BODY MASS INDEX: 29.22 KG/M2 | WEIGHT: 197.31 LBS

## 2024-12-10 VITALS
BODY MASS INDEX: 28.55 KG/M2 | OXYGEN SATURATION: 94 % | HEART RATE: 97 BPM | SYSTOLIC BLOOD PRESSURE: 134 MMHG | WEIGHT: 193.31 LBS | DIASTOLIC BLOOD PRESSURE: 87 MMHG

## 2024-12-10 DIAGNOSIS — I70.0 AORTIC ATHEROSCLEROSIS: ICD-10-CM

## 2024-12-10 DIAGNOSIS — K21.9 GASTROESOPHAGEAL REFLUX DISEASE WITHOUT ESOPHAGITIS: ICD-10-CM

## 2024-12-10 DIAGNOSIS — J44.1 CHRONIC OBSTRUCTIVE PULMONARY DISEASE WITH ACUTE EXACERBATION: ICD-10-CM

## 2024-12-10 DIAGNOSIS — G47.33 OSA (OBSTRUCTIVE SLEEP APNEA): ICD-10-CM

## 2024-12-10 DIAGNOSIS — C34.92 ADENOCARCINOMA, LUNG, LEFT: Primary | ICD-10-CM

## 2024-12-10 DIAGNOSIS — E78.5 DYSLIPIDEMIA: ICD-10-CM

## 2024-12-10 DIAGNOSIS — I25.10 CORONARY ARTERY DISEASE INVOLVING NATIVE CORONARY ARTERY OF NATIVE HEART WITHOUT ANGINA PECTORIS: Primary | Chronic | ICD-10-CM

## 2024-12-10 DIAGNOSIS — J43.2 CENTRILOBULAR EMPHYSEMA: ICD-10-CM

## 2024-12-10 DIAGNOSIS — I10 HTN (HYPERTENSION), BENIGN: ICD-10-CM

## 2024-12-10 DIAGNOSIS — C34.92 ADENOCARCINOMA, LUNG, LEFT: ICD-10-CM

## 2024-12-10 PROCEDURE — 99999 PR PBB SHADOW E&M-EST. PATIENT-LVL III: CPT | Mod: PBBFAC,,, | Performed by: INTERNAL MEDICINE

## 2024-12-10 PROCEDURE — 99999 PR PBB SHADOW E&M-EST. PATIENT-LVL V: CPT | Mod: PBBFAC,,, | Performed by: INTERNAL MEDICINE

## 2024-12-10 PROCEDURE — 99214 OFFICE O/P EST MOD 30 MIN: CPT | Mod: S$PBB,,, | Performed by: INTERNAL MEDICINE

## 2024-12-10 PROCEDURE — 99215 OFFICE O/P EST HI 40 MIN: CPT | Mod: PBBFAC,27 | Performed by: INTERNAL MEDICINE

## 2024-12-10 PROCEDURE — 99213 OFFICE O/P EST LOW 20 MIN: CPT | Mod: PBBFAC | Performed by: INTERNAL MEDICINE

## 2024-12-10 RX ORDER — BUDESONIDE, GLYCOPYRROLATE, AND FORMOTEROL FUMARATE 160; 9; 4.8 UG/1; UG/1; UG/1
2 AEROSOL, METERED RESPIRATORY (INHALATION) 2 TIMES DAILY
Qty: 10.7 G | Refills: 3 | Status: SHIPPED | OUTPATIENT
Start: 2024-12-10

## 2024-12-10 RX ORDER — OMEPRAZOLE 20 MG/1
20 CAPSULE, DELAYED RELEASE ORAL DAILY
Qty: 90 CAPSULE | Refills: 3 | Status: SHIPPED | OUTPATIENT
Start: 2024-12-10

## 2024-12-10 RX ORDER — ALBUTEROL SULFATE 0.63 MG/3ML
0.63 SOLUTION RESPIRATORY (INHALATION) EVERY 6 HOURS PRN
Qty: 375 ML | Refills: 11 | Status: SHIPPED | OUTPATIENT
Start: 2024-12-10

## 2024-12-10 RX ORDER — ALBUTEROL SULFATE 90 UG/1
2 INHALANT RESPIRATORY (INHALATION) EVERY 4 HOURS PRN
Qty: 8.5 G | Refills: 11 | Status: SHIPPED | OUTPATIENT
Start: 2024-12-10

## 2024-12-10 RX ORDER — ROFLUMILAST 500 UG/1
1 TABLET ORAL DAILY
Qty: 90 TABLET | Refills: 3 | Status: SHIPPED | OUTPATIENT
Start: 2024-12-10

## 2024-12-10 NOTE — ASSESSMENT & PLAN NOTE
Significant chronic condition to be followed longitudinally with following long term treatment plan.     spirometry with severe COPD. On breztri. Not in exacerbation.  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

## 2024-12-10 NOTE — PROGRESS NOTES
Subjective:   Patient ID:  Jordin Allen Jr. is a 79 y.o. male who presents for follow up of Coronary Artery Disease      HPI: Routine 6 month f/u.  He has mets in his brain but they've shrunk with treatment per recent MRI.  He did chemo and radiation and now is on consolidation immunotherapy with durvalumab.    He states that he can walk at a regular pace without getting winded for some time, but if he pushes himself he gets a bit out of breath.  No angina, syncope, palpitations, or PND/orthopnea.  No hemoptysis.      My June 2024 HPI: Back for < 3 month f/u.  A couple of days after last seeing me, he developed hemoptysis and sought care in the ED.  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  He's now on carboplatin and paclitaxel, and is getting radiation therapy.     He's feeling awfully well for someone with the above.  No chest discomfort.  No significant dyspnea with exertion, though he hasn't been playing golf as he used to.  He's going to wait until the weather cools down.     No more hemoptysis.     My 3/18/24 HPI: Yearly f/u.  He's struggling because he has been having more dyspnea playing golf, especially walking to the green and back when he does the most walking.  No angina with this but he also did not have chest discomfort prior to 2013 stenting (MONTIEL mostly).     No palpitations.  No syncope.  No orthopnea/PND.        Nov 2024 cardiac PET    There are no clinically significant perfusion abnormalities. There is a small (<10%) amount of mild resting heterogeneity that improves with stress, indicative of non-obstructive disease.    The whole heart absolute myocardial perfusion values were elevated at rest, normal during stress and CFR is mildly reduced in part due to elevated resting flow.    CT attenuation images demonstrate severe diffuse coronary calcifications in the LAD, LCX and RCA territory and mild diffuse aortic calcifications in the descending aorta.     The gated perfusion images showed an ejection fraction of 70% at rest and 70% during stress. A normal ejection fraction is greater than 47%.    There is normal wall motion at rest and normal wall motion during stress.    The study's ECG is negative for ischemia.      Patient Active Problem List   Diagnosis    Centrilobular emphysema    GERD (gastroesophageal reflux disease)    HTN (hypertension), benign    Sciatica    SHOLA (obstructive sleep apnea)    Restless leg syndrome    Benign neoplasm of colon    CAD (coronary artery disease)    Class 1 obesity due to excess calories with serious comorbidity in adult    History of smoking 30 or more pack years    Chronic obstructive pulmonary disease    NAFLD (nonalcoholic fatty liver disease)    Adenomatous polyp of colon    Dyslipidemia    Influenza    Aortic atherosclerosis    Hilar mass    Hemoptysis    Adenocarcinoma, lung, left    Severe obesity (BMI 35.0-39.9) with comorbidity    Hypotension    Oxygen dependent    Secondary malignant neoplasm of brain    Decreased appetite    Anxiety       Current Outpatient Medications   Medication Sig    albuterol (ACCUNEB) 0.63 mg/3 mL Nebu Inhale 3 mLs (0.63 mg total) by nebulization every 6 (six) hours as needed (if symptoms failed to improve with inhaler). Rescue    albuterol (PROVENTIL/VENTOLIN HFA) 90 mcg/actuation inhaler Inhale 2 puffs into the lungs every 4 (four) hours as needed for Wheezing.    amitriptyline (ELAVIL) 25 MG tablet Take 1 tablet (25 mg total) by mouth once daily.    aspirin (ECOTRIN) 81 MG EC tablet Take 81 mg by mouth once daily.     atorvastatin (LIPITOR) 80 MG tablet TAKE 1 TABLET (80 MG TOTAL) BY MOUTH ONCE DAILY.    BETA-CAROTENE,A, W-C & E/MIN (OCUVITE ORAL) Take 1 capsule by mouth once daily.     budesonide-glycopyr-formoterol (BREZTRI AEROSPHERE) 160-9-4.8 mcg/actuation HFAA Inhale 2 puffs into the lungs 2 (two) times a day.    echinacea 400 mg Cap Take 1 capsule by mouth once daily.     fluticasone  propionate (FLONASE) 50 mcg/actuation nasal spray Spray 2 sprays (100 mcg total) in Each Nostril once daily.    latanoprost 0.005 % ophthalmic solution Place 1 drop into both eyes once daily.     latanoprost 0.005 % ophthalmic solution INSTILL 1 DROP INTO BOTH EYES AT BEDTIME    mirtazapine (REMERON) 7.5 MG Tab Take 1 tablet (7.5 mg total) by mouth every evening.    nitroGLYCERIN (NITROSTAT) 0.4 MG SL tablet Place 1 tablet (0.4 mg total) under the tongue every 5 (five) minutes as needed.    nystatin (MYCOSTATIN) 100,000 unit/mL suspension Take 5 mLs (500,000 Units total) by mouth 4 (four) times daily.    omeprazole (PRILOSEC) 20 MG capsule Take 1 capsule (20 mg total) by mouth once daily.    roflumilast (DALIRESP) 500 mcg Tab Take 1 tablet (500 mcg total) by mouth once daily.    senna (SENOKOT) 8.6 mg tablet Take 1 tablet by mouth once daily.    traZODone (DESYREL) 50 MG tablet Take 1 tablet (50 mg total) by mouth every evening.    losartan (COZAAR) 100 MG tablet Take 1 tablet (100 mg total) by mouth once daily.    predniSONE (DELTASONE) 10 MG tablet After completing 40mg once daily for 14 days, take 30mg by mouth for 7 days, then take 20mg for 7 days, then take 10mg for 7 days then stop (Patient not taking: Reported on 12/10/2024)     No current facility-administered medications for this visit.     Facility-Administered Medications Ordered in Other Visits   Medication    dextrose 50% injection 12.5 g    dextrose 50% injection 25 g    insulin regular injection 0-8 Units       ROS  The review of systems is negative except as above.     Objective:   Physical Exam  Vitals reviewed.   Constitutional:       Appearance: He is well-developed.   HENT:      Head: Normocephalic and atraumatic.   Eyes:      General: No scleral icterus.     Conjunctiva/sclera: Conjunctivae normal.   Neck:      Vascular: No JVD.   Cardiovascular:      Rate and Rhythm: Normal rate and regular rhythm.      Pulses: Intact distal pulses.      Heart  sounds: Normal heart sounds. No murmur heard.     No friction rub. No gallop.   Pulmonary:      Effort: Pulmonary effort is normal.      Breath sounds: Normal breath sounds. No wheezing or rales.   Abdominal:      General: Bowel sounds are normal. There is no distension.      Palpations: Abdomen is soft.      Tenderness: There is no abdominal tenderness.   Musculoskeletal:         General: Normal range of motion.      Cervical back: Normal range of motion and neck supple.   Skin:     General: Skin is warm and dry.      Findings: No erythema or rash.   Neurological:      Mental Status: He is alert and oriented to person, place, and time.   Psychiatric:         Behavior: Behavior normal.         Thought Content: Thought content normal.         Judgment: Judgment normal.         Lab Results   Component Value Date    WBC 5.80 12/06/2024    HGB 12.2 (L) 12/06/2024    HCT 38.3 (L) 12/06/2024    MCV 86 12/06/2024     12/06/2024         Chemistry        Component Value Date/Time     12/06/2024 0715    K 4.0 12/06/2024 0715     12/06/2024 0715    CO2 29 12/06/2024 0715    BUN 17 12/06/2024 0715    CREATININE 1.2 12/06/2024 0715     (H) 12/06/2024 0715        Component Value Date/Time    CALCIUM 9.6 12/06/2024 0715    ALKPHOS 98 12/06/2024 0715    AST 15 12/06/2024 0715    ALT 19 12/06/2024 0715    BILITOT 0.7 12/06/2024 0715    ESTGFRAFRICA >60.0 12/02/2021 0711    EGFRNONAA >60.0 12/02/2021 0711            Lab Results   Component Value Date    CHOL 98 (L) 03/06/2024    CHOL 96 (L) 10/12/2023    CHOL 112 (L) 12/16/2022     Lab Results   Component Value Date    HDL 37 (L) 03/06/2024    HDL 38 (L) 10/12/2023    HDL 44 12/16/2022     Lab Results   Component Value Date    LDLCALC 50.0 (L) 03/06/2024    LDLCALC 46.0 (L) 10/12/2023    LDLCALC 51.0 (L) 12/16/2022     Lab Results   Component Value Date    TRIG 55 03/06/2024    TRIG 60 10/12/2023    TRIG 85 12/16/2022     Lab Results   Component Value Date     CHOLHDL 37.8 03/06/2024    CHOLHDL 39.6 10/12/2023    CHOLHDL 39.3 12/16/2022       Lab Results   Component Value Date    TSH 1.270 12/06/2024       Lab Results   Component Value Date    HGBA1C 5.7 (H) 03/06/2024       Assessment:     1. Coronary artery disease involving native coronary artery of native heart without angina pectoris    2. Aortic atherosclerosis    3. Adenocarcinoma, lung, left    4. HTN (hypertension), benign    5. Dyslipidemia        Plan:     Continue current medicines.    Diet/exercise goals reinforced.    F/U 6 months

## 2024-12-10 NOTE — ASSESSMENT & PLAN NOTE
Completed treatment with chemo/XRT. Brain XRT for lesionsx2. Back on immunotherapy.   -discussed interactions between his treatment and COPD.  -suspect the LLL area is consolidation of prior RXT changes and not malignancy.  -No current evidence for pneumonitis,  on recent imaging.

## 2024-12-10 NOTE — PROGRESS NOTES
Subjective:       Patient ID: Jordin Allen Jr. is a 79 y.o. male.  The patient's last visit with me was on 6/28/2024.     Overall, his breathing is doing well. He denies any COPD flairs.  Using Breztri 2x/day. He has been using his nebulizer multiple times per day because he thought he needed to use it and not for symptoms. Discussed using just as needed.  No hospitalizations.  Treated for pneumonitis from XRT by oncology with steroid course that recently stopped. Had HA after stopping steroids but this is now resolved.    Follow-up  Review of Systems    Objective:      Vitals:    12/10/24 0930   BP: 134/87   Pulse: 97     Wt Readings from Last 3 Encounters:   12/10/24 87.7 kg (193 lb 5.5 oz)   12/06/24 88.3 kg (194 lb 10.7 oz)   12/06/24 88.3 kg (194 lb 10.7 oz)     Temp Readings from Last 3 Encounters:   12/06/24 98.2 °F (36.8 °C)   12/06/24 98.2 °F (36.8 °C) (Oral)   11/13/24 97.6 °F (36.4 °C) (Oral)     BP Readings from Last 3 Encounters:   12/10/24 134/87   12/06/24 135/87   12/06/24 130/84     Pulse Readings from Last 3 Encounters:   12/10/24 97   12/06/24 85   12/06/24 91       Physical Exam   Constitutional: He appears well-developed and well-nourished.   Pulmonary/Chest: Effort normal and breath sounds normal. He has no wheezes.   Musculoskeletal:         General: No edema.   Vitals reviewed.    CBC  Lab Results   Component Value Date    WBC 5.80 12/06/2024    HGB 12.2 (L) 12/06/2024    HCT 38.3 (L) 12/06/2024    MCV 86 12/06/2024     12/06/2024         CMP  Sodium   Date Value Ref Range Status   12/06/2024 143 136 - 145 mmol/L Final     Potassium   Date Value Ref Range Status   12/06/2024 4.0 3.5 - 5.1 mmol/L Final     Chloride   Date Value Ref Range Status   12/06/2024 108 95 - 110 mmol/L Final     CO2   Date Value Ref Range Status   12/06/2024 29 23 - 29 mmol/L Final     Glucose   Date Value Ref Range Status   12/06/2024 113 (H) 70 - 110 mg/dL Final     BUN   Date Value Ref Range Status    12/06/2024 17 8 - 23 mg/dL Final     Creatinine   Date Value Ref Range Status   12/06/2024 1.2 0.5 - 1.4 mg/dL Final     Calcium   Date Value Ref Range Status   12/06/2024 9.6 8.7 - 10.5 mg/dL Final     Total Protein   Date Value Ref Range Status   12/06/2024 6.5 6.0 - 8.4 g/dL Final     Albumin   Date Value Ref Range Status   12/06/2024 3.2 (L) 3.5 - 5.2 g/dL Final     Total Bilirubin   Date Value Ref Range Status   12/06/2024 0.7 0.1 - 1.0 mg/dL Final     Comment:     For infants and newborns, interpretation of results should be based  on gestational age, weight and in agreement with clinical  observations.    Premature Infant recommended reference ranges:  Up to 24 hours.............<8.0 mg/dL  Up to 48 hours............<12.0 mg/dL  3-5 days..................<15.0 mg/dL  6-29 days.................<15.0 mg/dL       Alkaline Phosphatase   Date Value Ref Range Status   12/06/2024 98 40 - 150 U/L Final     AST   Date Value Ref Range Status   12/06/2024 15 10 - 40 U/L Final     ALT   Date Value Ref Range Status   12/06/2024 19 10 - 44 U/L Final     Anion Gap   Date Value Ref Range Status   12/06/2024 6 (L) 8 - 16 mmol/L Final     eGFR   Date Value Ref Range Status   12/06/2024 >60.0 >60 mL/min/1.73 m^2 Final       ABG  POC PH   Date Value Ref Range Status   04/02/2013 7.476 (H) 7.35 - 7.45 Final     POC PO2   Date Value Ref Range Status   04/02/2013 76 (L) 80 - 100 mmHg Final     POC PCO2   Date Value Ref Range Status   04/02/2013 32.4 (L) 35 - 45 mmHg Final           Personal Diagnostic Review  I have personally reviewed the following data and added my own interpretation as below:  CT Chest 12/3/24 images personally reviewed and shows stable hilar lesions and SANJAY areas. LLL new consolidation with air bronchogram in area of prior XRT changes.  Oncology notes reviewed      12/10/2024     9:30 AM 12/6/2024     8:59 AM 12/6/2024     7:29 AM 11/19/2024     9:16 AM 11/13/2024     7:10 AM 10/23/2024    10:10 AM 10/11/2024  "    8:13 AM   Pulmonary Function Tests   SpO2 94 % 96 % 96 %  98 % 98 % --   Height  5' 9" (1.753 m) 5' 9" (1.753 m) 5' 9" (1.753 m) 5' 9" (1.753 m) 5' 9" (1.753 m)    Weight 87.7 kg (193 lb 5.5 oz) 88.3 kg (194 lb 10.7 oz) 88.3 kg (194 lb 10.7 oz) 89.4 kg (197 lb) 89.4 kg (197 lb 1.5 oz) 82.4 kg (181 lb 10.5 oz)    BMI (Calculated)  28.7 28.7 29.1 29.1 26.8          Assessment:       1. Adenocarcinoma, lung, left    2. Gastroesophageal reflux disease without esophagitis    3. Centrilobular emphysema    4. Chronic obstructive pulmonary disease with acute exacerbation    5. SHOLA (obstructive sleep apnea)        Outpatient Encounter Medications as of 12/10/2024   Medication Sig Dispense Refill    albuterol (ACCUNEB) 0.63 mg/3 mL Nebu Take 3 mLs (0.63 mg total) by nebulization every 6 (six) hours as needed (if symptoms failed to improve with inhaler). Rescue 375 mL 11    albuterol (PROVENTIL/VENTOLIN HFA) 90 mcg/actuation inhaler Inhale 2 puffs into the lungs every 4 (four) hours as needed for Wheezing. 8.5 g 11    amitriptyline (ELAVIL) 25 MG tablet Take 1 tablet (25 mg total) by mouth once daily. 90 tablet 3    aspirin (ECOTRIN) 81 MG EC tablet Take 81 mg by mouth once daily.       atorvastatin (LIPITOR) 80 MG tablet TAKE 1 TABLET (80 MG TOTAL) BY MOUTH ONCE DAILY. 90 tablet 3    BETA-CAROTENE,A, W-C & E/MIN (OCUVITE ORAL) Take 1 capsule by mouth once daily.       budesonide-glycopyr-formoterol (BREZTRI AEROSPHERE) 160-9-4.8 mcg/actuation HFAA Inhale 2 puffs into the lungs 2 (two) times a day. 10.7 g 3    echinacea 400 mg Cap Take 1 capsule by mouth once daily.       fluticasone propionate (FLONASE) 50 mcg/actuation nasal spray Spray 2 sprays (100 mcg total) in Each Nostril once daily. 16 g 11    latanoprost 0.005 % ophthalmic solution Place 1 drop into both eyes once daily.       latanoprost 0.005 % ophthalmic solution INSTILL 1 DROP INTO BOTH EYES AT BEDTIME 7.5 mL 3    losartan (COZAAR) 100 MG tablet " Take 1 tablet (100 mg total) by mouth once daily. 90 tablet 2    mirtazapine (REMERON) 7.5 MG Tab Take 1 tablet (7.5 mg total) by mouth every evening. 30 tablet 0    nitroGLYCERIN (NITROSTAT) 0.4 MG SL tablet Place 1 tablet (0.4 mg total) under the tongue every 5 (five) minutes as needed. 100 tablet 3    nystatin (MYCOSTATIN) 100,000 unit/mL suspension Take 5 mLs (500,000 Units total) by mouth 4 (four) times daily. 473 mL 0    omeprazole (PRILOSEC) 20 MG capsule Take 1 capsule (20 mg total) by mouth once daily. 90 capsule 3    [] ondansetron (ZOFRAN-ODT) 4 MG TbDL Take 2 tablets (8 mg total) by mouth every 6 (six) hours as needed (nausea, vomiting). 120 tablet 0    predniSONE (DELTASONE) 10 MG tablet After completing 40mg once daily for 14 days, take 30mg by mouth for 7 days, then take 20mg for 7 days, then take 10mg for 7 days then stop 14 tablet 0    [] predniSONE (DELTASONE) 20 MG tablet Take 2 tablets (40 mg total) by mouth once daily for 14 days, THEN 1 tablet (20 mg total) once daily for 14 days, THEN 0.5 tablets (10 mg total) once daily for 14 days. 49 tablet 0    roflumilast (DALIRESP) 500 mcg Tab Take 1 tablet (500 mcg total) by mouth once daily. 90 tablet 3    senna (SENOKOT) 8.6 mg tablet Take 1 tablet by mouth once daily. 60 tablet 2    [] sulfamethoxazole-trimethoprim 800-160mg (BACTRIM DS) 800-160 mg Tab Take 1 tablet by mouth every Mon, Wed, Fri. 15 tablet 0    traZODone (DESYREL) 50 MG tablet Take 1 tablet (50 mg total) by mouth every evening. 30 tablet 2    [DISCONTINUED] albuterol (ACCUNEB) 0.63 mg/3 mL Nebu Take 3 mLs (0.63 mg total) by nebulization every 6 (six) hours as needed (if symptoms failed to improve with inhaler). Rescue 375 mL 11    [DISCONTINUED] albuterol (PROVENTIL/VENTOLIN HFA) 90 mcg/actuation inhaler Inhale 2 puffs into the lungs every 4 (four) hours as needed for Wheezing. 8.5 g 11    [DISCONTINUED] budesonide-glycopyr-formoterol (BREZTRI  AEROSPHERE) 160-9-4.8 mcg/actuation HFAA Inhale 2 puffs into the lungs 2 (two) times a day. 10.7 g 3    [DISCONTINUED] mirtazapine (REMERON) 7.5 MG Tab Take 1 tablet (7.5 mg total) by mouth every evening. (Patient not taking: Reported on 11/13/2024) 30 tablet 0    [DISCONTINUED] omeprazole (PRILOSEC) 20 MG capsule Take 1 capsule (20 mg total) by mouth once daily. 90 capsule 3    [DISCONTINUED] roflumilast (DALIRESP) 500 mcg Tab Take 1 tablet (500 mcg total) by mouth once daily. 90 tablet 3     Facility-Administered Encounter Medications as of 12/10/2024   Medication Dose Route Frequency Provider Last Rate Last Admin    dextrose 50% injection 12.5 g  12.5 g Intravenous PRN Lidia Deleon MD        dextrose 50% injection 25 g  25 g Intravenous PRN Lidia Deleon MD        [COMPLETED] gadobutroL (GADAVIST) injection 10 mL  10 mL Intravenous ONCE PRN Isacc Nuñez IV, MD   10 mL at 12/09/24 0807    insulin regular injection 0-8 Units  0-8 Units Intravenous Continuous PRN Lidia Deleon MD         1. Gastroesophageal reflux disease without esophagitis  - omeprazole (PRILOSEC) 20 MG capsule; Take 1 capsule (20 mg total) by mouth once daily.  Dispense: 90 capsule; Refill: 3    2. Centrilobular emphysema  - albuterol (PROVENTIL/VENTOLIN HFA) 90 mcg/actuation inhaler; Inhale 2 puffs into the lungs every 4 (four) hours as needed for Wheezing.  Dispense: 8.5 g; Refill: 11  - roflumilast (DALIRESP) 500 mcg Tab; Take 1 tablet (500 mcg total) by mouth once daily.  Dispense: 90 tablet; Refill: 3    3. Chronic obstructive pulmonary disease with acute exacerbation  - roflumilast (DALIRESP) 500 mcg Tab; Take 1 tablet (500 mcg total) by mouth once daily.  Dispense: 90 tablet; Refill: 3    4. Adenocarcinoma, lung, left    5. SHOLA (obstructive sleep apnea)    Plan:     Problem List Items Addressed This Visit          Pulmonary    Centrilobular emphysema (Chronic)    Relevant Medications    albuterol  (PROVENTIL/VENTOLIN HFA) 90 mcg/actuation inhaler    roflumilast (DALIRESP) 500 mcg Tab    Chronic obstructive pulmonary disease    Overview     72 yo male, former  comes for his periodic pulmonary visit. He saw Dr. Estrada in April and has done well, with no COPD exacerbations. He recently moved to [place] to be closer to his daughter. He uses symbicort and spiriva and his maintenance medications. Peak flow 270 l/min  In April his Fev-1: 1.33 liters 47%. He is an avid golfer and plays 3-4 times a week weather permitting. He has a single cardiac stent.  Proximal LAD was occluded and was opened and a stent placed, 10/31.13  No further symptoms.         Current Assessment & Plan     Significant chronic condition to be followed longitudinally with following long term treatment plan.     spirometry with severe COPD. On breztri. Not in exacerbation.  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         Relevant Medications    albuterol (PROVENTIL/VENTOLIN HFA) 90 mcg/actuation inhaler    roflumilast (DALIRESP) 500 mcg Tab       Oncology    Adenocarcinoma, lung, left - Primary    Current Assessment & Plan     Completed treatment with chemo/XRT. Brain XRT for lesionsx2. Back on immunotherapy.   -discussed interactions between his treatment and COPD.  -suspect the LLL area is consolidation of prior RXT changes and not malignancy.  -No current evidence for pneumonitis,  on recent imaging.         [Secondary malignant neoplasm of brain]       GI    GERD (gastroesophageal reflux disease)    Relevant Medications    omeprazole (PRILOSEC) 20 MG capsule       Other    SHOLA (obstructive sleep apnea)    Current Assessment & Plan     Continue oxygen at night with CPAP. Otherwise, no longer requires O2            Please note Overview Notes are historic documentation. Please review A/P for current updates.  No follow-ups on  file.    Future Appointments   Date Time Provider Department Center   12/10/2024  1:30 PM Prince Ba MD [facility] CARDIO [facility] [place]   1/24/2025  8:30 AM [facility] OIC-MRI1 [facility]   1/24/2025 10:00 AM Bienvenido Henson MD [facility] RAD ONC Alvarez Ward MD

## 2024-12-11 ENCOUNTER — PATIENT MESSAGE (OUTPATIENT)
Dept: ADMINISTRATIVE | Facility: OTHER | Age: 79
End: 2024-12-11
Payer: MEDICARE

## 2024-12-12 ENCOUNTER — PATIENT MESSAGE (OUTPATIENT)
Dept: ADMINISTRATIVE | Facility: OTHER | Age: 79
End: 2024-12-12
Payer: MEDICARE

## 2024-12-13 ENCOUNTER — TELEPHONE (OUTPATIENT)
Dept: HEMATOLOGY/ONCOLOGY | Facility: CLINIC | Age: 79
End: 2024-12-13
Payer: MEDICARE

## 2024-12-13 ENCOUNTER — PATIENT MESSAGE (OUTPATIENT)
Dept: ADMINISTRATIVE | Facility: OTHER | Age: 79
End: 2024-12-13
Payer: MEDICARE

## 2024-12-14 ENCOUNTER — PATIENT MESSAGE (OUTPATIENT)
Dept: ADMINISTRATIVE | Facility: OTHER | Age: 79
End: 2024-12-14
Payer: MEDICARE

## 2024-12-15 ENCOUNTER — PATIENT MESSAGE (OUTPATIENT)
Dept: ADMINISTRATIVE | Facility: OTHER | Age: 79
End: 2024-12-15
Payer: MEDICARE

## 2024-12-16 ENCOUNTER — PATIENT MESSAGE (OUTPATIENT)
Dept: ADMINISTRATIVE | Facility: OTHER | Age: 79
End: 2024-12-16
Payer: MEDICARE

## 2024-12-17 ENCOUNTER — PATIENT MESSAGE (OUTPATIENT)
Dept: ADMINISTRATIVE | Facility: OTHER | Age: 79
End: 2024-12-17
Payer: MEDICARE

## 2024-12-18 ENCOUNTER — PATIENT MESSAGE (OUTPATIENT)
Dept: ADMINISTRATIVE | Facility: OTHER | Age: 79
End: 2024-12-18
Payer: MEDICARE

## 2024-12-18 DIAGNOSIS — I10 HTN (HYPERTENSION), BENIGN: ICD-10-CM

## 2024-12-19 ENCOUNTER — PATIENT MESSAGE (OUTPATIENT)
Dept: ADMINISTRATIVE | Facility: OTHER | Age: 79
End: 2024-12-19
Payer: MEDICARE

## 2024-12-19 RX ORDER — LOSARTAN POTASSIUM 100 MG/1
100 TABLET ORAL DAILY
Qty: 90 TABLET | Refills: 3 | Status: SHIPPED | OUTPATIENT
Start: 2024-12-19

## 2024-12-20 ENCOUNTER — PATIENT MESSAGE (OUTPATIENT)
Dept: ADMINISTRATIVE | Facility: OTHER | Age: 79
End: 2024-12-20
Payer: MEDICARE

## 2024-12-21 ENCOUNTER — PATIENT MESSAGE (OUTPATIENT)
Dept: ADMINISTRATIVE | Facility: OTHER | Age: 79
End: 2024-12-21
Payer: MEDICARE

## 2024-12-22 ENCOUNTER — PATIENT MESSAGE (OUTPATIENT)
Dept: ADMINISTRATIVE | Facility: OTHER | Age: 79
End: 2024-12-22
Payer: MEDICARE

## 2024-12-23 ENCOUNTER — PATIENT MESSAGE (OUTPATIENT)
Dept: ADMINISTRATIVE | Facility: OTHER | Age: 79
End: 2024-12-23
Payer: MEDICARE

## 2024-12-24 ENCOUNTER — PATIENT MESSAGE (OUTPATIENT)
Dept: ADMINISTRATIVE | Facility: OTHER | Age: 79
End: 2024-12-24
Payer: MEDICARE

## 2024-12-25 ENCOUNTER — PATIENT MESSAGE (OUTPATIENT)
Dept: ADMINISTRATIVE | Facility: OTHER | Age: 79
End: 2024-12-25
Payer: MEDICARE

## 2024-12-26 ENCOUNTER — PATIENT MESSAGE (OUTPATIENT)
Dept: ADMINISTRATIVE | Facility: OTHER | Age: 79
End: 2024-12-26
Payer: MEDICARE

## 2024-12-27 ENCOUNTER — HOSPITAL ENCOUNTER (INPATIENT)
Facility: HOSPITAL | Age: 79
LOS: 2 days | Discharge: HOME | DRG: 206 | End: 2024-12-29
Attending: STUDENT IN AN ORGANIZED HEALTH CARE EDUCATION/TRAINING PROGRAM | Admitting: INTERNAL MEDICINE
Payer: MEDICARE

## 2024-12-27 ENCOUNTER — PATIENT MESSAGE (OUTPATIENT)
Dept: ADMINISTRATIVE | Facility: OTHER | Age: 79
End: 2024-12-27
Payer: MEDICARE

## 2024-12-27 ENCOUNTER — PATIENT MESSAGE (OUTPATIENT)
Dept: PULMONOLOGY | Facility: CLINIC | Age: 79
End: 2024-12-27
Payer: MEDICARE

## 2024-12-27 ENCOUNTER — PATIENT MESSAGE (OUTPATIENT)
Dept: HEMATOLOGY/ONCOLOGY | Facility: CLINIC | Age: 79
End: 2024-12-27
Payer: MEDICARE

## 2024-12-27 DIAGNOSIS — C79.31 METASTASIS TO BRAIN: ICD-10-CM

## 2024-12-27 DIAGNOSIS — I25.10 CORONARY ARTERY DISEASE INVOLVING NATIVE CORONARY ARTERY OF NATIVE HEART WITHOUT ANGINA PECTORIS: ICD-10-CM

## 2024-12-27 DIAGNOSIS — J43.2 CENTRILOBULAR EMPHYSEMA: Chronic | ICD-10-CM

## 2024-12-27 DIAGNOSIS — J44.9 CHRONIC OBSTRUCTIVE PULMONARY DISEASE, UNSPECIFIED COPD TYPE: ICD-10-CM

## 2024-12-27 DIAGNOSIS — R06.02 SHORTNESS OF BREATH: ICD-10-CM

## 2024-12-27 DIAGNOSIS — J98.4 PNEUMONITIS: Primary | ICD-10-CM

## 2024-12-27 DIAGNOSIS — R07.9 CHEST PAIN: ICD-10-CM

## 2024-12-27 DIAGNOSIS — C34.92 ADENOCARCINOMA, LUNG, LEFT: ICD-10-CM

## 2024-12-27 LAB
ALBUMIN SERPL BCP-MCNC: 3.1 G/DL (ref 3.5–5.2)
ALLENS TEST: ABNORMAL
ALP SERPL-CCNC: 94 U/L (ref 40–150)
ALT SERPL W/O P-5'-P-CCNC: 9 U/L (ref 10–44)
ANION GAP SERPL CALC-SCNC: 8 MMOL/L (ref 8–16)
AST SERPL-CCNC: 13 U/L (ref 10–40)
BASOPHILS # BLD AUTO: 0.01 K/UL (ref 0–0.2)
BASOPHILS NFR BLD: 0.1 % (ref 0–1.9)
BILIRUB SERPL-MCNC: 0.6 MG/DL (ref 0.1–1)
BNP SERPL-MCNC: 89 PG/ML (ref 0–99)
BUN SERPL-MCNC: 18 MG/DL (ref 6–30)
BUN SERPL-MCNC: 18 MG/DL (ref 8–23)
CALCIUM SERPL-MCNC: 9.1 MG/DL (ref 8.7–10.5)
CHLORIDE SERPL-SCNC: 101 MMOL/L (ref 95–110)
CHLORIDE SERPL-SCNC: 105 MMOL/L (ref 95–110)
CO2 SERPL-SCNC: 33 MMOL/L (ref 23–29)
CREAT SERPL-MCNC: 1.1 MG/DL (ref 0.5–1.4)
CREAT SERPL-MCNC: 1.2 MG/DL (ref 0.5–1.4)
DIFFERENTIAL METHOD BLD: ABNORMAL
EOSINOPHIL # BLD AUTO: 0.4 K/UL (ref 0–0.5)
EOSINOPHIL NFR BLD: 4.9 % (ref 0–8)
ERYTHROCYTE [DISTWIDTH] IN BLOOD BY AUTOMATED COUNT: 14.9 % (ref 11.5–14.5)
EST. GFR  (NO RACE VARIABLE): >60 ML/MIN/1.73 M^2
GLUCOSE SERPL-MCNC: 125 MG/DL (ref 70–110)
GLUCOSE SERPL-MCNC: 127 MG/DL (ref 70–110)
HCO3 UR-SCNC: 33.4 MMOL/L (ref 24–28)
HCT VFR BLD AUTO: 35.8 % (ref 40–54)
HCT VFR BLD CALC: 35 %PCV (ref 36–54)
HGB BLD-MCNC: 11.4 G/DL (ref 14–18)
IMM GRANULOCYTES # BLD AUTO: 0.04 K/UL (ref 0–0.04)
IMM GRANULOCYTES NFR BLD AUTO: 0.5 % (ref 0–0.5)
INFLUENZA A, MOLECULAR: NOT DETECTED
INFLUENZA B, MOLECULAR: NOT DETECTED
LYMPHOCYTES # BLD AUTO: 0.7 K/UL (ref 1–4.8)
LYMPHOCYTES NFR BLD: 7.8 % (ref 18–48)
MCH RBC QN AUTO: 27.1 PG (ref 27–31)
MCHC RBC AUTO-ENTMCNC: 31.8 G/DL (ref 32–36)
MCV RBC AUTO: 85 FL (ref 82–98)
MONOCYTES # BLD AUTO: 0.8 K/UL (ref 0.3–1)
MONOCYTES NFR BLD: 8.7 % (ref 4–15)
NEUTROPHILS # BLD AUTO: 6.8 K/UL (ref 1.8–7.7)
NEUTROPHILS NFR BLD: 78 % (ref 38–73)
NRBC BLD-RTO: 0 /100 WBC
PCO2 BLDA: 56.2 MMHG (ref 35–45)
PH SMN: 7.38 [PH] (ref 7.35–7.45)
PLATELET # BLD AUTO: 145 K/UL (ref 150–450)
PMV BLD AUTO: 10.5 FL (ref 9.2–12.9)
PO2 BLDA: 30 MMHG (ref 40–60)
POC BE: 8 MMOL/L
POC IONIZED CALCIUM: 1.23 MMOL/L (ref 1.06–1.42)
POC SATURATED O2: 53 % (ref 95–100)
POC TCO2 (MEASURED): 31 MMOL/L (ref 23–29)
POC TCO2: 35 MMOL/L (ref 24–29)
POTASSIUM BLD-SCNC: 3.1 MMOL/L (ref 3.5–5.1)
POTASSIUM SERPL-SCNC: 3.1 MMOL/L (ref 3.5–5.1)
PROT SERPL-MCNC: 6.1 G/DL (ref 6–8.4)
RBC # BLD AUTO: 4.21 M/UL (ref 4.6–6.2)
RSV AG BY MOLECULAR METHOD: NOT DETECTED
SAMPLE: ABNORMAL
SAMPLE: ABNORMAL
SARS-COV-2 RNA RESP QL NAA+PROBE: NOT DETECTED
SITE: ABNORMAL
SODIUM BLD-SCNC: 144 MMOL/L (ref 136–145)
SODIUM SERPL-SCNC: 146 MMOL/L (ref 136–145)
TROPONIN I SERPL DL<=0.01 NG/ML-MCNC: 14 NG/L (ref 0–35)
WBC # BLD AUTO: 8.72 K/UL (ref 3.9–12.7)

## 2024-12-27 PROCEDURE — 25000003 PHARM REV CODE 250

## 2024-12-27 PROCEDURE — 99285 EMERGENCY DEPT VISIT HI MDM: CPT | Mod: 25

## 2024-12-27 PROCEDURE — 82803 BLOOD GASES ANY COMBINATION: CPT

## 2024-12-27 PROCEDURE — 63600175 PHARM REV CODE 636 W HCPCS: Performed by: STUDENT IN AN ORGANIZED HEALTH CARE EDUCATION/TRAINING PROGRAM

## 2024-12-27 PROCEDURE — 93005 ELECTROCARDIOGRAM TRACING: CPT

## 2024-12-27 PROCEDURE — 93010 ELECTROCARDIOGRAM REPORT: CPT | Mod: ,,, | Performed by: INTERNAL MEDICINE

## 2024-12-27 PROCEDURE — 85025 COMPLETE CBC W/AUTO DIFF WBC: CPT

## 2024-12-27 PROCEDURE — 27000221 HC OXYGEN, UP TO 24 HOURS

## 2024-12-27 PROCEDURE — 25000003 PHARM REV CODE 250: Performed by: STUDENT IN AN ORGANIZED HEALTH CARE EDUCATION/TRAINING PROGRAM

## 2024-12-27 PROCEDURE — 63600175 PHARM REV CODE 636 W HCPCS

## 2024-12-27 PROCEDURE — 94640 AIRWAY INHALATION TREATMENT: CPT

## 2024-12-27 PROCEDURE — 83880 ASSAY OF NATRIURETIC PEPTIDE: CPT

## 2024-12-27 PROCEDURE — 84484 ASSAY OF TROPONIN QUANT: CPT

## 2024-12-27 PROCEDURE — 0241U SARS-COV2 (COVID) WITH FLU/RSV BY PCR: CPT

## 2024-12-27 PROCEDURE — 96366 THER/PROPH/DIAG IV INF ADDON: CPT

## 2024-12-27 PROCEDURE — 96365 THER/PROPH/DIAG IV INF INIT: CPT

## 2024-12-27 PROCEDURE — 12000002 HC ACUTE/MED SURGE SEMI-PRIVATE ROOM

## 2024-12-27 PROCEDURE — 80053 COMPREHEN METABOLIC PANEL: CPT

## 2024-12-27 PROCEDURE — 96375 TX/PRO/DX INJ NEW DRUG ADDON: CPT

## 2024-12-27 PROCEDURE — 99900035 HC TECH TIME PER 15 MIN (STAT)

## 2024-12-27 PROCEDURE — 94761 N-INVAS EAR/PLS OXIMETRY MLT: CPT | Mod: XB

## 2024-12-27 RX ORDER — ONDANSETRON 8 MG/1
8 TABLET, ORALLY DISINTEGRATING ORAL EVERY 8 HOURS PRN
Status: DISCONTINUED | OUTPATIENT
Start: 2024-12-28 | End: 2024-12-29 | Stop reason: HOSPADM

## 2024-12-27 RX ORDER — SENNOSIDES 8.6 MG/1
1 TABLET ORAL DAILY
Status: DISCONTINUED | OUTPATIENT
Start: 2024-12-28 | End: 2024-12-29 | Stop reason: HOSPADM

## 2024-12-27 RX ORDER — LOSARTAN POTASSIUM 50 MG/1
100 TABLET ORAL DAILY
Status: DISCONTINUED | OUTPATIENT
Start: 2024-12-28 | End: 2024-12-29 | Stop reason: HOSPADM

## 2024-12-27 RX ORDER — NYSTATIN 100000 [USP'U]/ML
5 SUSPENSION ORAL 4 TIMES DAILY
Status: DISCONTINUED | OUTPATIENT
Start: 2024-12-28 | End: 2024-12-29 | Stop reason: HOSPADM

## 2024-12-27 RX ORDER — IBUPROFEN 200 MG
24 TABLET ORAL
Status: DISCONTINUED | OUTPATIENT
Start: 2024-12-27 | End: 2024-12-29 | Stop reason: HOSPADM

## 2024-12-27 RX ORDER — ONDANSETRON HYDROCHLORIDE 2 MG/ML
4 INJECTION, SOLUTION INTRAVENOUS
Status: COMPLETED | OUTPATIENT
Start: 2024-12-27 | End: 2024-12-27

## 2024-12-27 RX ORDER — MIRTAZAPINE 7.5 MG/1
7.5 TABLET, FILM COATED ORAL NIGHTLY
Status: DISCONTINUED | OUTPATIENT
Start: 2024-12-28 | End: 2024-12-29 | Stop reason: HOSPADM

## 2024-12-27 RX ORDER — IBUPROFEN 200 MG
16 TABLET ORAL
Status: DISCONTINUED | OUTPATIENT
Start: 2024-12-27 | End: 2024-12-29 | Stop reason: HOSPADM

## 2024-12-27 RX ORDER — ASPIRIN 81 MG/1
81 TABLET ORAL DAILY
Status: DISCONTINUED | OUTPATIENT
Start: 2024-12-28 | End: 2024-12-29 | Stop reason: HOSPADM

## 2024-12-27 RX ORDER — HYDRALAZINE HYDROCHLORIDE 25 MG/1
25 TABLET, FILM COATED ORAL
Status: COMPLETED | OUTPATIENT
Start: 2024-12-27 | End: 2024-12-27

## 2024-12-27 RX ORDER — IPRATROPIUM BROMIDE AND ALBUTEROL SULFATE 2.5; .5 MG/3ML; MG/3ML
3 SOLUTION RESPIRATORY (INHALATION) EVERY 4 HOURS
Status: DISCONTINUED | OUTPATIENT
Start: 2024-12-28 | End: 2024-12-28

## 2024-12-27 RX ORDER — TRAZODONE HYDROCHLORIDE 50 MG/1
50 TABLET ORAL NIGHTLY
Status: DISCONTINUED | OUTPATIENT
Start: 2024-12-28 | End: 2024-12-29 | Stop reason: HOSPADM

## 2024-12-27 RX ORDER — FLUTICASONE PROPIONATE 50 MCG
2 SPRAY, SUSPENSION (ML) NASAL DAILY
Status: DISCONTINUED | OUTPATIENT
Start: 2024-12-28 | End: 2024-12-29 | Stop reason: HOSPADM

## 2024-12-27 RX ORDER — SODIUM CHLORIDE 0.9 % (FLUSH) 0.9 %
10 SYRINGE (ML) INJECTION EVERY 12 HOURS PRN
Status: DISCONTINUED | OUTPATIENT
Start: 2024-12-27 | End: 2024-12-29 | Stop reason: HOSPADM

## 2024-12-27 RX ORDER — PREDNISONE 20 MG/1
40 TABLET ORAL DAILY
Status: DISCONTINUED | OUTPATIENT
Start: 2024-12-27 | End: 2024-12-28

## 2024-12-27 RX ORDER — ENOXAPARIN SODIUM 100 MG/ML
40 INJECTION SUBCUTANEOUS EVERY 24 HOURS
Status: DISCONTINUED | OUTPATIENT
Start: 2024-12-28 | End: 2024-12-29 | Stop reason: HOSPADM

## 2024-12-27 RX ORDER — PANTOPRAZOLE SODIUM 40 MG/1
40 TABLET, DELAYED RELEASE ORAL DAILY
Status: DISCONTINUED | OUTPATIENT
Start: 2024-12-28 | End: 2024-12-29 | Stop reason: HOSPADM

## 2024-12-27 RX ORDER — ATORVASTATIN CALCIUM 20 MG/1
80 TABLET, FILM COATED ORAL DAILY
Status: DISCONTINUED | OUTPATIENT
Start: 2024-12-28 | End: 2024-12-29 | Stop reason: HOSPADM

## 2024-12-27 RX ORDER — ALBUTEROL SULFATE 90 UG/1
1 INHALANT RESPIRATORY (INHALATION) EVERY 4 HOURS PRN
Status: DISCONTINUED | OUTPATIENT
Start: 2024-12-27 | End: 2024-12-29 | Stop reason: HOSPADM

## 2024-12-27 RX ORDER — GLUCAGON 1 MG
1 KIT INJECTION
Status: DISCONTINUED | OUTPATIENT
Start: 2024-12-27 | End: 2024-12-29 | Stop reason: HOSPADM

## 2024-12-27 RX ORDER — ROFLUMILAST 500 UG/1
1 TABLET ORAL DAILY
Status: DISCONTINUED | OUTPATIENT
Start: 2024-12-28 | End: 2024-12-29 | Stop reason: HOSPADM

## 2024-12-27 RX ORDER — ACETAMINOPHEN 325 MG/1
650 TABLET ORAL EVERY 4 HOURS PRN
Status: DISCONTINUED | OUTPATIENT
Start: 2024-12-28 | End: 2024-12-29 | Stop reason: HOSPADM

## 2024-12-27 RX ORDER — TALC
6 POWDER (GRAM) TOPICAL NIGHTLY PRN
Status: DISCONTINUED | OUTPATIENT
Start: 2024-12-28 | End: 2024-12-29 | Stop reason: HOSPADM

## 2024-12-27 RX ORDER — GUAIFENESIN 100 MG/5ML
200 SOLUTION ORAL EVERY 4 HOURS PRN
Status: DISCONTINUED | OUTPATIENT
Start: 2024-12-28 | End: 2024-12-28

## 2024-12-27 RX ORDER — LATANOPROST 50 UG/ML
1 SOLUTION/ DROPS OPHTHALMIC DAILY
Status: DISCONTINUED | OUTPATIENT
Start: 2024-12-28 | End: 2024-12-29 | Stop reason: HOSPADM

## 2024-12-27 RX ORDER — AMITRIPTYLINE HYDROCHLORIDE 25 MG/1
25 TABLET, FILM COATED ORAL DAILY
Status: DISCONTINUED | OUTPATIENT
Start: 2024-12-28 | End: 2024-12-29 | Stop reason: HOSPADM

## 2024-12-27 RX ORDER — POTASSIUM CHLORIDE 7.45 MG/ML
10 INJECTION INTRAVENOUS
Status: COMPLETED | OUTPATIENT
Start: 2024-12-27 | End: 2024-12-27

## 2024-12-27 RX ORDER — NALOXONE HCL 0.4 MG/ML
0.02 VIAL (ML) INJECTION
Status: DISCONTINUED | OUTPATIENT
Start: 2024-12-27 | End: 2024-12-29 | Stop reason: HOSPADM

## 2024-12-27 RX ADMIN — ONDANSETRON 4 MG: 2 INJECTION INTRAMUSCULAR; INTRAVENOUS at 08:12

## 2024-12-27 RX ADMIN — SODIUM CHLORIDE 1000 ML: 9 INJECTION, SOLUTION INTRAVENOUS at 08:12

## 2024-12-27 RX ADMIN — HYDRALAZINE HYDROCHLORIDE 25 MG: 25 TABLET ORAL at 08:12

## 2024-12-27 RX ADMIN — PREDNISONE 40 MG: 20 TABLET ORAL at 11:12

## 2024-12-27 RX ADMIN — POTASSIUM CHLORIDE 10 MEQ: 7.46 INJECTION, SOLUTION INTRAVENOUS at 08:12

## 2024-12-27 RX ADMIN — IPRATROPIUM BROMIDE AND ALBUTEROL SULFATE 3 ML: 2.5; .5 SOLUTION RESPIRATORY (INHALATION) at 11:12

## 2024-12-27 NOTE — ED PROVIDER NOTES
Encounter Date: 12/27/2024       History     Chief Complaint   Patient presents with    Shortness of Breath     From home via EMS with c/o increased SOB over the last few days.  Hx of lung cancer, finished chemo 6 mo ago, now on immunotherapy. Endorses cough and congestion. On PRN oxygen. Arrives on 2L [place]     Jordin Allen Jr. is a 79 y.o. male who has a past medical history of Benign neoplasm of colon (10/01/2013), Bronchitis chronic, CAD (coronary artery disease) (10/31/2013), COPD (chronic obstructive pulmonary disease), Emphysema of lung, GERD (gastroesophageal reflux disease), colonic polyps, Hypertension, NAFLD (nonalcoholic fatty liver disease) (03/27/2017), SHOLA on CPAP, RLS (restless legs syndrome), and Screen for colon cancer (08/30/2013).    The patient presents to the ED due to weakness and shortness of breath for the past several days.  Patient notes he had a history of pneumonitis that took a month to clear with antibiotics and steroids by his oncologist.  Patient denies hemoptysis, productive cough, chest tightness or chest pain, abdominal pain, nausea or vomiting, one-sided weakness or pleuritic pain.  Patient is undergoing active chemotherapy for lung cancer.  Patient was concerned this may be re-emergence of pneumonitis at which time he called his oncologist who recommended to be evaluated in the ED. patient states he has been using more oxygen through his oxygen doris at home and has been experiencing dyspnea on mild exertion.  Patient denies fever or chills at home.        The history is provided by the patient and medical records. No  was used.     Review of patient's allergies indicates:   Allergen Reactions    Brilinta [ticagrelor] Itching    Lisinopril      Other reaction(s): cough    Metoprolol succinate Rash     Past Medical History:   Diagnosis Date    Benign neoplasm of colon 10/01/2013    Bronchitis chronic     CAD (coronary artery disease) 10/31/2013    COPD  (chronic obstructive pulmonary disease)     Emphysema of lung     GERD (gastroesophageal reflux disease)     Hx of colonic polyps     Hypertension     NAFLD (nonalcoholic fatty liver disease) 2017    SHOLA on CPAP     RLS (restless legs syndrome)     Screen for colon cancer 2013     Past Surgical History:   Procedure Laterality Date    bilateral foot surgery      CARDIAC CATHETERIZATION  2013    x1    CATARACT EXTRACTION, BILATERAL      COLON SURGERY      Ace Mott MD    COLONOSCOPY N/A 3/21/2018    Procedure: COLONOSCOPY;  Surgeon: Terry Mott MD;  Location: [facility] ENDO (4TH FLR);  Service: Endoscopy;  Laterality: N/A;    COLONOSCOPY N/A 2019    Procedure: COLONOSCOPY;  Surgeon: John Mantilla MD;  Location: [facility] ENDO (4TH FLR);  Service: Endoscopy;  Laterality: N/A;    COLONOSCOPY N/A 2022    Procedure: COLONOSCOPY;  Surgeon: MEDINA Farris MD;  Location: [facility] ENDO (4TH FLR);  Service: Endoscopy;  Laterality: N/A;  fully vacc-inst portal-tb    ENDOBRONCHIAL ULTRASOUND Bilateral 2024    Procedure: ENDOBRONCHIAL ULTRASOUND (EBUS);  Surgeon: Naveed Aguero MD;  [facility];  Service: Pulmonary;  Laterality: Bilateral;    scrotal abscess removal       Family History   Problem Relation Name Age of Onset    Heart disease Mother      Heart attack Mother      Hypertension Mother      No Known Problems Sister      No Known Problems Daughter 2     Asthma Neg Hx      Emphysema Neg Hx      Prostate cancer Neg Hx      Cirrhosis Neg Hx       Social History     Tobacco Use    Smoking status: Former     Current packs/day: 0.00     Average packs/day: 1 pack/day for 40.0 years (40.0 ttl pk-yrs)     Types: Cigarettes     Start date: 8/15/1972     Quit date: 8/15/2012     Years since quittin.3     Passive exposure: Never    Smokeless tobacco: Never   Substance Use Topics    Alcohol use: Yes     Alcohol/week: 3.0 standard drinks of alcohol     Types: 3 Cans of beer per week      Comment: maybe 2-3  beers weekly    Drug use: No     Review of Systems   All other systems reviewed and are negative.      Physical Exam     Initial Vitals [12/27/24 1706]   BP Pulse Resp Temp SpO2   (!) 140/88 80 16 97.9 °F (36.6 °C) 99 %      MAP       --         Physical Exam    Nursing note and vitals reviewed.  Constitutional: He appears well-developed and well-nourished. He is not diaphoretic. No distress.   HENT:   Head: Normocephalic and atraumatic.   Eyes: Conjunctivae and EOM are normal. Pupils are equal, round, and reactive to light.   Neck: Neck supple.   Normal range of motion.  Cardiovascular:  Normal rate, regular rhythm, normal heart sounds and intact distal pulses.           Pulmonary/Chest: He is in respiratory distress. He has wheezes. He has rales.   Abdominal: Abdomen is soft. He exhibits no distension. There is no abdominal tenderness.   Musculoskeletal:         General: Normal range of motion.      Cervical back: Normal range of motion and neck supple.     Neurological: He is alert and oriented to person, place, and time. He has normal strength. GCS score is 15. GCS eye subscore is 4. GCS verbal subscore is 5. GCS motor subscore is 6.   Skin: Skin is warm and dry. Capillary refill takes less than 2 seconds. There is pallor.   Psychiatric: He has a normal mood and affect. His behavior is normal. Judgment and thought content normal.         ED Course   Procedures  Labs Reviewed   CBC W/ AUTO DIFFERENTIAL - Abnormal       Result Value    WBC 8.72      RBC 4.21 (*)     Hemoglobin 11.4 (*)     Hematocrit 35.8 (*)     MCV 85      MCH 27.1      MCHC 31.8 (*)     RDW 14.9 (*)     Platelets 145 (*)     MPV 10.5      Immature Granulocytes 0.5      Gran # (ANC) 6.8      Immature Grans (Abs) 0.04      Lymph # 0.7 (*)     Mono # 0.8      Eos # 0.4      Baso # 0.01      nRBC 0      Gran % 78.0 (*)     Lymph % 7.8 (*)     Mono % 8.7      Eosinophil % 4.9      Basophil % 0.1      Differential Method Automated      COMPREHENSIVE METABOLIC PANEL - Abnormal    Sodium 146 (*)     Potassium 3.1 (*)     Chloride 105      CO2 33 (*)     Glucose 127 (*)     BUN 18      Creatinine 1.1      Calcium 9.1      Total Protein 6.1      Albumin 3.1 (*)     Total Bilirubin 0.6      Alkaline Phosphatase 94      AST 13      ALT 9 (*)     eGFR >60.0      Anion Gap 8     ISTAT PROCEDURE - Abnormal    POC Glucose 125 (*)     POC BUN 18      POC Creatinine 1.2      POC Sodium 144      POC Potassium 3.1 (*)     POC Chloride 101      POC TCO2 (MEASURED) 31 (*)     POC Ionized Calcium 1.23      POC Hematocrit 35 (*)     Sample EVE     ISTAT PROCEDURE - Abnormal    POC PH 7.381      POC PCO2 56.2 (*)     POC PO2 30 (*)     POC HCO3 33.4 (*)     POC BE 8 (*)     POC SATURATED O2 53      POC TCO2 35 (*)     Sample VENOUS      Site Other      Allens Test N/A     TROPONIN I HIGH SENSITIVITY    Troponin I High Sensitivity 14     B-TYPE NATRIURETIC PEPTIDE    BNP 89     SARS-COV2 (COVID) WITH FLU/RSV BY PCR    SARS-CoV2 (COVID-19) Qualitative PCR Not Detected      Influenza A, Molecular Not Detected      Influenza B, Molecular Not Detected      RSV Ag by Molecular Method Not Detected     SARS-COV-2 RDRP GENE     EKG Readings: (Independently Interpreted)   Rhythm: Normal Sinus Rhythm. Ectopy: No Ectopy. Conduction: Normal. ST Segments: Normal ST Segments. T Waves: Normal. Clinical Impression: Normal Sinus Rhythm       Imaging Results              X-Ray Chest 1 View (Final result)  Result time 12/27/24 21:15:22      Final result by Gavin Herman MD (12/27/24 21:15:22)                   Impression:      No acute radiographic abnormality in the visualized chest.      Electronically signed by: Gavin Herman  Date:    12/27/2024  Time:    21:15               Narrative:    EXAMINATION:  XR CHEST 1 VIEW    CLINICAL HISTORY:  shortness of breath;    TECHNIQUE:  Single frontal view of the chest was performed.    COMPARISON:  Portable chest x-ray  07/23/2024    FINDINGS:  Midline thoracic trachea.  Atherosclerotic calcification in the thoracic aortic arch.  Normal sized cardiopericardial silhouette.    No consolidation, discrete pneumothorax, or blunting of either lateral costophrenic angle.    Lucencies projecting along either hemidiaphragm, and along the left side of the cardiomediastinal silhouette, are most compatible with Mach effect artifact.    Degenerative changes and osteopenia in the visualized skeleton, including within the suboptimally visualized spine.    No acute radiographic abnormality is demonstrated in the visualized upper abdomen.    Skin folds project over either hemithorax.  Lung markings project beyond these skin folds.  Metallic necklace projects over the base of the neck.  Oxygen tubing projects over the torso.                                       Medications   potassium bicarbonate disintegrating tablet 20 mEq (has no administration in time range)   potassium chloride 10 mEq in 100 mL IVPB (0 mEq Intravenous Stopped 12/27/24 2215)   sodium chloride 0.9% bolus 1,000 mL 1,000 mL (0 mLs Intravenous Stopped 12/27/24 2215)   ondansetron injection 4 mg (4 mg Intravenous Given 12/27/24 2041)   hydrALAZINE tablet 25 mg (25 mg Oral Given 12/27/24 2042)     Medical Decision Making  79-year-old male described above presenting with dyspnea on mild exertion, increased work of breathing, increased oxygen demands and recent history of pneumonitis with underlying lung cancer undergoing active chemotherapy.  Will order basic labs, imaging and EKG to evaluate the following possible diagnoses.    Ddx includes but is not limited to:   ACS, CHF, pneumonia, pneumothorax, asthma , COPD. Considered PE however much lower likelihood at this time considering patient's history and presentation.    Update/re-evaluation:  Patient has elevated normal white count but moderately left shift in granulocyte count though likely due to margination with recent steroid  use.  Left lower lung opacities worsening from prior studies on chest x-ray.  These findings are suspicious for pneumonia.  No gross metabolic or electrolyte derangements appreciated separate from hypokalemia of 2.8, potassium repleted.  At this time I believe patient will benefit from observation period with oncology following as consult.    Patient signed out to the oncoming provider pending remainder of studies and final hospital placement.    Amount and/or Complexity of Data Reviewed  Labs: ordered. Decision-making details documented in ED Course.  Radiology: ordered. Decision-making details documented in ED Course.  ECG/medicine tests:  Decision-making details documented in ED Course.  Discussion of management or test interpretation with external provider(s): I discussed the case with Hematology/Oncology who agreed to evaluate patient.  Appreciate recs    Risk  Prescription drug management.  Decision regarding hospitalization.               ED Course as of 12/27/24 2255   Fri Dec 27, 2024   1941 Gran %(!): 78.0 [JL]   1946 ISTAT PROCEDURE(!!)  Respiratory acidosis metabolic compensation [JL]   1947 X-Ray Chest 1 View  As per interpretation of chest x-ray:  Left lower lung opacities worsening from prior study.  Possible recrudescence of pneumonitis versus superimposed infection [JL]      ED Course User Index  [JL] Tammy Soriano MD                           Clinical Impression:  Final diagnoses:  [R06.02] Shortness of breath          ED Disposition Condition    Admit Stable                Tammy Soriano MD  Resident  12/27/24 2255

## 2024-12-27 NOTE — TELEPHONE ENCOUNTER
Spoke with patient via phone regarding symptoms. Pt describes decline is breathing quality x1 week. Pt on 2L cont. throughout day via o2 concentrator. Pt walks 15-20' from bed to bathroom, takes 'a while' to recover if he does not wear o2. Pt reports being confined to bed and chair for most of the day, afraid to get SOB with moving too much. Pt denies chest pain, endorses coughing.    Pt using Breztri inhaler BID, albuterol nebulizer PRN    Pt reports breathing at night is adequate with use of CPAP, but he awakes 5-6 x per night. Pt said he dreads getting up in the morning-- having to worry about oxygen concentrator all day, van with traveling because he will need access to an outlet if his o2 runs out. Pt confirms that he feels anxious most of the day. He is especially anxious that the pneumonitis could be back. Pt cannot remember if he takes anxiety medication or not.    Pt with decreased appetite, some nausea, 1 episode of vomiting last night. Pt reports previous use of appetite stimulant. Discontinued when on steroid taper previously, but has resumed this week.    Will reach out to provider to get recommendations.

## 2024-12-27 NOTE — TELEPHONE ENCOUNTER
He needs to go to the emergency room, he had radiation pneumonitis which eventually improved and he recently started in immunotherapy again, with these symptoms I would definitely send him to the emergency room

## 2024-12-28 PROBLEM — J98.4 PNEUMONITIS: Status: ACTIVE | Noted: 2024-12-28

## 2024-12-28 PROBLEM — E87.6 HYPOKALEMIA: Status: ACTIVE | Noted: 2024-12-28

## 2024-12-28 LAB
ALBUMIN SERPL BCP-MCNC: 3.2 G/DL (ref 3.5–5.2)
ALP SERPL-CCNC: 95 U/L (ref 40–150)
ALT SERPL W/O P-5'-P-CCNC: 11 U/L (ref 10–44)
ANION GAP SERPL CALC-SCNC: 11 MMOL/L (ref 8–16)
AST SERPL-CCNC: 13 U/L (ref 10–40)
BASOPHILS # BLD AUTO: 0.01 K/UL (ref 0–0.2)
BASOPHILS NFR BLD: 0.1 % (ref 0–1.9)
BILIRUB SERPL-MCNC: 0.7 MG/DL (ref 0.1–1)
BUN SERPL-MCNC: 14 MG/DL (ref 8–23)
CALCIUM SERPL-MCNC: 9.2 MG/DL (ref 8.7–10.5)
CHLORIDE SERPL-SCNC: 106 MMOL/L (ref 95–110)
CO2 SERPL-SCNC: 25 MMOL/L (ref 23–29)
CREAT SERPL-MCNC: 1 MG/DL (ref 0.5–1.4)
DIFFERENTIAL METHOD BLD: ABNORMAL
EOSINOPHIL # BLD AUTO: 0 K/UL (ref 0–0.5)
EOSINOPHIL NFR BLD: 0.1 % (ref 0–8)
ERYTHROCYTE [DISTWIDTH] IN BLOOD BY AUTOMATED COUNT: 14.7 % (ref 11.5–14.5)
EST. GFR  (NO RACE VARIABLE): >60 ML/MIN/1.73 M^2
GLUCOSE SERPL-MCNC: 146 MG/DL (ref 70–110)
HCT VFR BLD AUTO: 36.3 % (ref 40–54)
HGB BLD-MCNC: 11.9 G/DL (ref 14–18)
IMM GRANULOCYTES # BLD AUTO: 0.05 K/UL (ref 0–0.04)
IMM GRANULOCYTES NFR BLD AUTO: 0.6 % (ref 0–0.5)
LYMPHOCYTES # BLD AUTO: 0.3 K/UL (ref 1–4.8)
LYMPHOCYTES NFR BLD: 3.4 % (ref 18–48)
MAGNESIUM SERPL-MCNC: 1.8 MG/DL (ref 1.6–2.6)
MCH RBC QN AUTO: 27.5 PG (ref 27–31)
MCHC RBC AUTO-ENTMCNC: 32.8 G/DL (ref 32–36)
MCV RBC AUTO: 84 FL (ref 82–98)
MONOCYTES # BLD AUTO: 0.1 K/UL (ref 0.3–1)
MONOCYTES NFR BLD: 1.2 % (ref 4–15)
NEUTROPHILS # BLD AUTO: 7.3 K/UL (ref 1.8–7.7)
NEUTROPHILS NFR BLD: 94.6 % (ref 38–73)
NRBC BLD-RTO: 0 /100 WBC
OHS QRS DURATION: 76 MS
OHS QTC CALCULATION: 517 MS
PLATELET # BLD AUTO: 141 K/UL (ref 150–450)
PMV BLD AUTO: 10.1 FL (ref 9.2–12.9)
POTASSIUM SERPL-SCNC: 2.9 MMOL/L (ref 3.5–5.1)
PROT SERPL-MCNC: 6.4 G/DL (ref 6–8.4)
RBC # BLD AUTO: 4.33 M/UL (ref 4.6–6.2)
SODIUM SERPL-SCNC: 142 MMOL/L (ref 136–145)
WBC # BLD AUTO: 7.7 K/UL (ref 3.9–12.7)

## 2024-12-28 PROCEDURE — 63600175 PHARM REV CODE 636 W HCPCS

## 2024-12-28 PROCEDURE — 94761 N-INVAS EAR/PLS OXIMETRY MLT: CPT

## 2024-12-28 PROCEDURE — 80053 COMPREHEN METABOLIC PANEL: CPT | Performed by: STUDENT IN AN ORGANIZED HEALTH CARE EDUCATION/TRAINING PROGRAM

## 2024-12-28 PROCEDURE — 85025 COMPLETE CBC W/AUTO DIFF WBC: CPT | Performed by: STUDENT IN AN ORGANIZED HEALTH CARE EDUCATION/TRAINING PROGRAM

## 2024-12-28 PROCEDURE — 36415 COLL VENOUS BLD VENIPUNCTURE: CPT | Performed by: STUDENT IN AN ORGANIZED HEALTH CARE EDUCATION/TRAINING PROGRAM

## 2024-12-28 PROCEDURE — 94640 AIRWAY INHALATION TREATMENT: CPT

## 2024-12-28 PROCEDURE — 25000003 PHARM REV CODE 250: Performed by: STUDENT IN AN ORGANIZED HEALTH CARE EDUCATION/TRAINING PROGRAM

## 2024-12-28 PROCEDURE — 20600001 HC STEP DOWN PRIVATE ROOM

## 2024-12-28 PROCEDURE — 63600175 PHARM REV CODE 636 W HCPCS: Performed by: STUDENT IN AN ORGANIZED HEALTH CARE EDUCATION/TRAINING PROGRAM

## 2024-12-28 PROCEDURE — 99900035 HC TECH TIME PER 15 MIN (STAT)

## 2024-12-28 PROCEDURE — 25000242 PHARM REV CODE 250 ALT 637 W/ HCPCS

## 2024-12-28 PROCEDURE — 99223 1ST HOSP IP/OBS HIGH 75: CPT | Mod: AI,,, | Performed by: INTERNAL MEDICINE

## 2024-12-28 PROCEDURE — 27000221 HC OXYGEN, UP TO 24 HOURS

## 2024-12-28 PROCEDURE — 25000242 PHARM REV CODE 250 ALT 637 W/ HCPCS: Performed by: STUDENT IN AN ORGANIZED HEALTH CARE EDUCATION/TRAINING PROGRAM

## 2024-12-28 PROCEDURE — 25500020 PHARM REV CODE 255: Performed by: INTERNAL MEDICINE

## 2024-12-28 PROCEDURE — 83735 ASSAY OF MAGNESIUM: CPT | Performed by: STUDENT IN AN ORGANIZED HEALTH CARE EDUCATION/TRAINING PROGRAM

## 2024-12-28 PROCEDURE — 25000003 PHARM REV CODE 250

## 2024-12-28 RX ORDER — GUAIFENESIN AND DEXTROMETHORPHAN HYDROBROMIDE 10; 100 MG/5ML; MG/5ML
5 SYRUP ORAL EVERY 4 HOURS PRN
Status: DISCONTINUED | OUTPATIENT
Start: 2024-12-28 | End: 2024-12-29 | Stop reason: HOSPADM

## 2024-12-28 RX ORDER — PREDNISONE 20 MG/1
20 TABLET ORAL ONCE
Status: DISCONTINUED | OUTPATIENT
Start: 2024-12-28 | End: 2024-12-28

## 2024-12-28 RX ORDER — PREDNISONE 10 MG/1
10 TABLET ORAL DAILY
Status: DISCONTINUED | OUTPATIENT
Start: 2025-01-18 | End: 2024-12-29 | Stop reason: HOSPADM

## 2024-12-28 RX ORDER — IBUPROFEN 200 MG
600 TABLET ORAL EVERY 6 HOURS PRN
Status: DISCONTINUED | OUTPATIENT
Start: 2024-12-28 | End: 2024-12-29 | Stop reason: HOSPADM

## 2024-12-28 RX ORDER — IPRATROPIUM BROMIDE AND ALBUTEROL SULFATE 2.5; .5 MG/3ML; MG/3ML
3 SOLUTION RESPIRATORY (INHALATION)
Status: DISCONTINUED | OUTPATIENT
Start: 2024-12-28 | End: 2024-12-29 | Stop reason: HOSPADM

## 2024-12-28 RX ORDER — POTASSIUM CHLORIDE 20 MEQ/1
40 TABLET, EXTENDED RELEASE ORAL 2 TIMES DAILY
Status: COMPLETED | OUTPATIENT
Start: 2024-12-28 | End: 2024-12-28

## 2024-12-28 RX ORDER — PREDNISONE 20 MG/1
20 TABLET ORAL DAILY
Status: DISCONTINUED | OUTPATIENT
Start: 2025-01-14 | End: 2024-12-29 | Stop reason: HOSPADM

## 2024-12-28 RX ORDER — IBUPROFEN 200 MG
600 TABLET ORAL EVERY 6 HOURS PRN
Status: DISCONTINUED | OUTPATIENT
Start: 2024-12-28 | End: 2024-12-28

## 2024-12-28 RX ORDER — PREDNISONE 20 MG/1
60 TABLET ORAL DAILY
Status: DISCONTINUED | OUTPATIENT
Start: 2024-12-29 | End: 2024-12-29 | Stop reason: HOSPADM

## 2024-12-28 RX ORDER — PREDNISONE 20 MG/1
40 TABLET ORAL DAILY
Status: DISCONTINUED | OUTPATIENT
Start: 2025-01-06 | End: 2024-12-29 | Stop reason: HOSPADM

## 2024-12-28 RX ORDER — FLUTICASONE PROPIONATE 50 MCG
2 SPRAY, SUSPENSION (ML) NASAL ONCE
Status: COMPLETED | OUTPATIENT
Start: 2024-12-28 | End: 2024-12-28

## 2024-12-28 RX ORDER — BENZONATATE 100 MG/1
100 CAPSULE ORAL 3 TIMES DAILY PRN
Status: DISCONTINUED | OUTPATIENT
Start: 2024-12-28 | End: 2024-12-29 | Stop reason: HOSPADM

## 2024-12-28 RX ORDER — PREDNISONE 20 MG/1
20 TABLET ORAL ONCE
Status: COMPLETED | OUTPATIENT
Start: 2024-12-28 | End: 2024-12-28

## 2024-12-28 RX ORDER — IBUPROFEN 200 MG
400 TABLET ORAL EVERY 6 HOURS PRN
Status: DISCONTINUED | OUTPATIENT
Start: 2024-12-28 | End: 2024-12-28

## 2024-12-28 RX ADMIN — LATANOPROST 1 DROP: 50 SOLUTION OPHTHALMIC at 08:12

## 2024-12-28 RX ADMIN — LOSARTAN POTASSIUM 100 MG: 50 TABLET, FILM COATED ORAL at 08:12

## 2024-12-28 RX ADMIN — PANTOPRAZOLE SODIUM 40 MG: 40 TABLET, DELAYED RELEASE ORAL at 08:12

## 2024-12-28 RX ADMIN — MIRTAZAPINE 7.5 MG: 7.5 TABLET, FILM COATED ORAL at 08:12

## 2024-12-28 RX ADMIN — IBUPROFEN 600 MG: 200 TABLET, FILM COATED ORAL at 11:12

## 2024-12-28 RX ADMIN — POTASSIUM CHLORIDE 40 MEQ: 1500 TABLET, EXTENDED RELEASE ORAL at 08:12

## 2024-12-28 RX ADMIN — AMITRIPTYLINE HYDROCHLORIDE 25 MG: 25 TABLET, FILM COATED ORAL at 08:12

## 2024-12-28 RX ADMIN — TRAZODONE HYDROCHLORIDE 50 MG: 50 TABLET ORAL at 08:12

## 2024-12-28 RX ADMIN — ACETAMINOPHEN 650 MG: 325 TABLET ORAL at 08:12

## 2024-12-28 RX ADMIN — IPRATROPIUM BROMIDE AND ALBUTEROL SULFATE 3 ML: 2.5; .5 SOLUTION RESPIRATORY (INHALATION) at 03:12

## 2024-12-28 RX ADMIN — IPRATROPIUM BROMIDE AND ALBUTEROL SULFATE 3 ML: 2.5; .5 SOLUTION RESPIRATORY (INHALATION) at 04:12

## 2024-12-28 RX ADMIN — GUAIFENESIN 200 MG: 200 SOLUTION ORAL at 11:12

## 2024-12-28 RX ADMIN — ROFLUMILAST 500 MCG: 500 TABLET ORAL at 08:12

## 2024-12-28 RX ADMIN — ASPIRIN 81 MG: 81 TABLET, COATED ORAL at 08:12

## 2024-12-28 RX ADMIN — ATORVASTATIN CALCIUM 80 MG: 20 TABLET, FILM COATED ORAL at 08:12

## 2024-12-28 RX ADMIN — FLUTICASONE PROPIONATE 100 MCG: 50 SPRAY, METERED NASAL at 03:12

## 2024-12-28 RX ADMIN — SENNOSIDES 1 TABLET: 8.6 TABLET, FILM COATED ORAL at 08:12

## 2024-12-28 RX ADMIN — IOHEXOL 100 ML: 350 INJECTION, SOLUTION INTRAVENOUS at 06:12

## 2024-12-28 RX ADMIN — BENZONATATE 100 MG: 100 CAPSULE ORAL at 01:12

## 2024-12-28 RX ADMIN — NYSTATIN 500000 UNITS: 100000 SUSPENSION ORAL at 08:12

## 2024-12-28 RX ADMIN — PREDNISONE 40 MG: 20 TABLET ORAL at 08:12

## 2024-12-28 RX ADMIN — IBUPROFEN 600 MG: 200 TABLET, FILM COATED ORAL at 08:12

## 2024-12-28 RX ADMIN — ENOXAPARIN SODIUM 40 MG: 40 INJECTION SUBCUTANEOUS at 05:12

## 2024-12-28 RX ADMIN — Medication 6 MG: at 08:12

## 2024-12-28 RX ADMIN — IPRATROPIUM BROMIDE AND ALBUTEROL SULFATE 3 ML: 2.5; .5 SOLUTION RESPIRATORY (INHALATION) at 08:12

## 2024-12-28 RX ADMIN — IPRATROPIUM BROMIDE AND ALBUTEROL SULFATE 3 ML: 2.5; .5 SOLUTION RESPIRATORY (INHALATION) at 12:12

## 2024-12-28 RX ADMIN — NYSTATIN 500000 UNITS: 100000 SUSPENSION ORAL at 05:12

## 2024-12-28 RX ADMIN — NYSTATIN 500000 UNITS: 100000 SUSPENSION ORAL at 01:12

## 2024-12-28 RX ADMIN — ACETAMINOPHEN 650 MG: 325 TABLET ORAL at 05:12

## 2024-12-28 RX ADMIN — FLUTICASONE PROPIONATE 100 MCG: 50 SPRAY, METERED NASAL at 08:12

## 2024-12-28 RX ADMIN — PREDNISONE 20 MG: 20 TABLET ORAL at 11:12

## 2024-12-28 NOTE — ASSESSMENT & PLAN NOTE
[facility], [address]  Phone: [phone]  Fax [phone]    Physical Therapy Treatment Note/ Progress Report:           Date:  2020    Patient Name:  Sweta Zimmerman    :  1979  MRN: [id number]      Medical/Treatment Diagnosis Information:  · Diagnosis: S83.241A (ICD-10-CM) - Tear of medial meniscus of right knee, current, unspecified tear type, initial encounter;M94.261 (ICD-10-CM) - Chondromalacia of right knee;s/p R PMM and chondroplasty 20  · Treatment Diagnosis: R knee pain  (M25.561)    R knee stiffness (M25.661) R knee effusion (R00.457)  Insurance/Certification information:  PT Insurance Information: UMR  Physician Information:  Referring Practitioner: Alycia Arango MD      Latex Allergy:  [x]NO      []YES  Preferred Language for Healthcare:   [x]English       []other:        Has the plan of care been signed (Y/N):        []  Yes  [x]  No     Date of Patient follow up with Physician and OP note due: 20  Date of Onset of PT:20    Is this a Progress Report:     []  Yes  [x]  No      If Yes:  Date Range for reporting period:  Beginnin20  Ending:    Progress report will be due (10 Rx or 30 days ): 60       Recertification will be due (POC Duration  / 90 days ): 10/2/20       Visit # Insurance Allowable Requires auth   1 UMR    []no        []yes: pre cert not in           SUBJECTIVE:  Pain level:  5-8/10 See eval    OBJECTIVE: See eval   Observation:     PT Practice Pattern:D  Co morbidities: 1-2  Surgical: DOS 20 PMM  INITIAL VISIT 20 CURRENT VISIT   PAIN 0-10/10 5-8/10    FUNCTIONAL SCALE LEFS () 91%    ROM KE -15     KF 70                            STRENGHT (MMT) Quad      KE      KF      HF      HAB      HER (clams)           RESTRICTIONS/PRECAUTIONS: surgical protocol    Exercises/Interventions:     Therapeutic Ex (95341) Reps/Sets/time Notes/CUES HEP NC 2L as needed  PRN duonebs  PRN inhaler     Seated HS stretch  Pt Ed X   Calf stretch with strap  Pt Ed X   Seated QS  Pt Ed X   SLRF  Pt Ed X   Heel slide for KF  Pt Ed X                                 Pt Ed x8' Progression, POC, expectations, restrictions, Pt education was provided in post op restrictions, precautions, progression of rehab expectations and timeline. Manual Intervention (42127)      Changed dressings, applied tegaderm 2'                                   NMR re-education (46688)  CUES NEEDED                                                          Therapeutic Activity (40856)                                                Therapeutic Exercise and NMR EXR  [x] (93425) Provided verbal/tactile cueing for activities related to strengthening, flexibility, endurance, ROM for improvements in LE, proximal hip, and core control with self care, mobility, lifting, ambulation.  [] (98953) Provided verbal/tactile cueing for activities related to improving balance, coordination, kinesthetic sense, posture, motor skill, proprioception  to assist with LE, proximal hip, and core control in self care, mobility, lifting, ambulation and eccentric single leg control.      NMR and Therapeutic Activities:    [] (89668 or 86378) Provided verbal/tactile cueing for activities related to improving balance, coordination, kinesthetic sense, posture, motor skill, proprioception and motor activation to allow for proper function of core, proximal hip and LE with self care and ADLs  [] (40460) Gait Re-education- Provided training and instruction to the patient for proper LE, core and proximal hip recruitment and positioning and eccentric body weight control with ambulation re-education including up and down stairs     Home Exercise Program:    [x] (79429) Reviewed/Progressed HEP activities related to strengthening, flexibility, endurance, ROM of core, proximal hip and LE for functional self-care, mobility, lifting and ambulation/stair navigation   [] (79418)Reviewed/Progressed HEP activities related to improving balance, coordination, kinesthetic sense, posture, motor skill, proprioception of core, proximal hip and LE for self care, mobility, lifting, and ambulation/stair navigation      Manual Treatments:  PROM / STM / Oscillations-Mobs:  G-I, II, III, IV (PA's, Inf., Post.)  [x] (25710) Provided manual therapy to mobilize LE, proximal hip and/or LS spine soft tissue/joints for the purpose of modulating pain, promoting relaxation,  increasing ROM, reducing/eliminating soft tissue swelling/inflammation/restriction, improving soft tissue extensibility and allowing for proper ROM for normal function with self care, mobility, lifting and ambulation. Modalities:  HV x15' for edema   [x] GAME READY (VASO)- for significant edema, swelling, pain control. 15'    Charges:  Timed Code Treatment Minutes: 23   Total Treatment Minutes: 48       [x] EVAL (LOW) 39584 (typically 20 minutes face-to-face)  [] EVAL (MOD) 76957 (typically 30 minutes face-to-face)  [] EVAL (HIGH) 37687 (typically 45 minutes face-to-face)  [] RE-EVAL     [x] IP(72276) x 2    [] IONTO  [] NMR (51420) x     [x] VASO  [] Manual (61490) x      [] Other:  [] TA x      [] Mech Traction (02296)  [] ES(attended) (72154)      [x] ES (un) (15919):     GOALS:     Short Term Goals: To be achieved in: 2 weeks  1. Independent in HEP and progression per patient tolerance, in order to prevent re-injury. []? Progressing: []? Met: []? Not Met: []? Adjusted  2. Patient will have a decrease in pain to facilitate improvement in movement, function, and ADLs as indicated by Functional Deficits. []? Progressing: []? Met: []? Not Met: []? Adjusted     Long Term Goals: To be achieved in: 12 weeks  1. Disability index score of 50% or less for the LEFS to assist with reaching prior level of function. []? Progressing: []? Met: []? Not Met: []? Adjusted  2.  Patient will demonstrate increased AROM to 0-125 to allow for proper joint functioning as indicated by patients Functional Deficits. []? Progressing: []? Met: []? Not Met: []? Adjusted  3. Patient will demonstrate an increase in Strength to good proximal hip strength and control, 4+/5 in LE to allow for proper functional mobility as indicated by patients Functional Deficits. []? Progressing: []? Met: []? Not Met: []? Adjusted  4. Patient will return to daily household functional activities without increased symptoms or restriction. []? Progressing: []? Met: []? Not Met: []? Adjusted  5. Pt will be able to walk for exercise 30 minutes without knee pain. (patient specific functional goal)    []? Progressing: []? Met: []? Not Met: []? Adjusted             ASSESSMENT:  See eval.      Overall Progression Towards Functional goals/ Treatment Progress Update:  [] Patient is progressing as expected towards functional goals listed. [] Progression is slowed due to complexities/Impairments listed. [] Progression has been slowed due to co-morbidities. [x] Plan just implemented, too soon to assess goals progression <30days   [] Goals require adjustment due to lack of progress  [] Patient is not progressing as expected and requires additional follow up with physician  [] Other    Prognosis for POC: [x] Good [] Fair  [] Poor      Patient requires continued skilled intervention: [x] Yes  [] No    Treatment/Activity Tolerance:  [x] Patient able to complete treatment  [] Patient limited by fatigue  [] Patient limited by pain    [] Patient limited by other medical complications  [] Other:         PLAN: See eval  [] Continue per plan of care [] Alter current plan (see comments above)  [x] Plan of care initiated [] Hold pending MD visit [] Discharge      Electronically signed by:  Isha Evangelista, [id number]    Note: If patient does not return for scheduled/ recommended follow up visits, this note will serve as a discharge from care along with most recent update on progress.

## 2024-12-28 NOTE — SUBJECTIVE & OBJECTIVE
Interval History: No acute events overnight. Oxygenation stable on 2L NC. Not currently in respiratory distress, though coarse breath sounds noted on left. K down trending and 2.9 today. Patient c/o left lateral chest wall pain and HA that is chronic. He attributes this to brain mets and reports to alternate between tylenol and ibuprofen at home. Denies n/v, chest pain/tightness, f/c.     Oncology Treatment Plan:   OP DURVALUMAB 1500 MG Q4W    Medications:  Continuous Infusions:  Scheduled Meds:   albuterol-ipratropium  3 mL Nebulization Q4H    amitriptyline  25 mg Oral Daily    aspirin  81 mg Oral Daily    atorvastatin  80 mg Oral Daily    enoxparin  40 mg Subcutaneous Daily    fluticasone propionate  2 spray Each Nostril Daily    latanoprost  1 drop Both Eyes Daily    losartan  100 mg Oral Daily    mirtazapine  7.5 mg Oral QHS    nystatin  5 mL Oral QID    pantoprazole  40 mg Oral Daily    potassium chloride  40 mEq Oral BID    predniSONE  40 mg Oral Daily    roflumilast  1 tablet Oral Daily    senna  1 tablet Oral Daily    traZODone  50 mg Oral QHS     PRN Meds:  Current Facility-Administered Medications:     acetaminophen, 650 mg, Oral, Q4H PRN    albuterol, 1 puff, Inhalation, Q4H PRN    dextrose 50%, 12.5 g, Intravenous, PRN    dextrose 50%, 25 g, Intravenous, PRN    glucagon (human recombinant), 1 mg, Intramuscular, PRN    glucose, 16 g, Oral, PRN    glucose, 24 g, Oral, PRN    guaiFENesin 100 mg/5 ml, 200 mg, Oral, Q4H PRN    ibuprofen, 600 mg, Oral, Q6H PRN    melatonin, 6 mg, Oral, Nightly PRN    naloxone, 0.02 mg, Intravenous, PRN    ondansetron, 8 mg, Oral, Q8H PRN    sodium chloride 0.9%, 10 mL, Intravenous, Q12H PRN     Review of Systems  Objective:     Vital Signs (Most Recent):  Temp: 97.7 °F (36.5 °C) (12/28/24 0716)  Pulse: 68 (12/28/24 0847)  Resp: (!) 22 (12/28/24 0847)  BP: 126/82 (12/28/24 0716)  SpO2: 95 % (12/28/24 0847) Vital Signs (24h Range):  Temp:  [97.7 °F (36.5 °C)-98.5 °F (36.9 °C)]  97.7 °F (36.5 °C)  Pulse:  [68-94] 68  Resp:  [16-24] 22  SpO2:  [95 %-100 %] 95 %  BP: (126-173)/(72-98) 126/82     Weight: 85.8 kg (189 lb 2.5 oz)  Body mass index is 27.93 kg/m².  Body surface area is 2.04 meters squared.      Intake/Output Summary (Last 24 hours) at 12/28/2024 0955  Last data filed at 12/27/2024 2355  Gross per 24 hour   Intake 1100 ml   Output 400 ml   Net 700 ml        Physical Exam  Vitals reviewed.   Constitutional:       Appearance: Normal appearance.   HENT:      Head: Normocephalic and atraumatic.   Eyes:      General: No scleral icterus.     Conjunctiva/sclera: Conjunctivae normal.   Cardiovascular:      Rate and Rhythm: Normal rate and regular rhythm.      Pulses: Normal pulses.      Heart sounds: Normal heart sounds.   Pulmonary:      Effort: Pulmonary effort is normal. No respiratory distress.      Comments: Coarse breath sounds on left  Chest:      Chest wall: No tenderness.   Abdominal:      Palpations: Abdomen is soft.      Tenderness: There is no abdominal tenderness.   Skin:     Coloration: Skin is not jaundiced.      Findings: No erythema.   Neurological:      General: No focal deficit present.      Mental Status: He is alert and oriented to person, place, and time.   Psychiatric:         Mood and Affect: Mood normal.         Behavior: Behavior normal.          Significant Labs:   CBC:   Recent Labs   Lab 12/27/24 1859 12/27/24 1918 12/28/24  0619   WBC 8.72  --  7.70   HGB 11.4*  --  11.9*   HCT 35.8* 35* 36.3*   *  --  141*    and CMP:   Recent Labs   Lab 12/27/24 1859 12/28/24  0619   * 142   K 3.1* 2.9*    106   CO2 33* 25   * 146*   BUN 18 14   CREATININE 1.1 1.0   CALCIUM 9.1 9.2   PROT 6.1 6.4   ALBUMIN 3.1* 3.2*   BILITOT 0.6 0.7   ALKPHOS 94 95   AST 13 13   ALT 9* 11   ANIONGAP 8 11       Diagnostic Results:  I have reviewed all pertinent imaging results/findings within the past 24 hours.

## 2024-12-28 NOTE — H&P
[facility]  History & Physical    Patient Name: Jordin Allen Jr.  MRN: [id number]  Admission Date: 12/27/2024  Attending Physician: No att. providers found   Primary Care Provider: Sierra Lanrdy MD         Patient information was obtained from past medical records and ER records.       Subjective:     Principal Problem:Pneumonitis    Chief Complaint:   Chief Complaint   Patient presents with    Shortness of Breath     From home via EMS with c/o increased SOB over the last few days.  Hx of lung cancer, finished chemo 6 mo ago, now on immunotherapy. Endorses cough and congestion. On PRN oxygen. Arrives on 2L NC        HPI: Jordin Allen Jr. is a 79 y.o. male with PMHx of lung cancer, finished chemo 6 months ago but now on immunotherapy presenting with cough and congestion. Patient recently diagnosed with pneumonitis and treated with steroids. Patient was concerned this may be re-emergence of pneumonitis at which time he called his oncologist who recommended to be evaluated in the ED. Patient states he has been using more oxygen through his oxygen at home and has been experiencing dyspnea on mild exertion.  Patient denies fever or chills at home.  CXR worse than prior with LLL opacities, stable on 2L NC.        Past Medical History:   Diagnosis Date    Benign neoplasm of colon 10/01/2013    Bronchitis chronic     CAD (coronary artery disease) 10/31/2013    COPD (chronic obstructive pulmonary disease)     Emphysema of lung     GERD (gastroesophageal reflux disease)     Hx of colonic polyps     Hypertension     NAFLD (nonalcoholic fatty liver disease) 03/27/2017    SHOLA on CPAP     RLS (restless legs syndrome)     Screen for colon cancer 08/30/2013       Past Surgical History:   Procedure Laterality Date    bilateral foot surgery  1993    CARDIAC CATHETERIZATION  2013    x1    CATARACT EXTRACTION, BILATERAL      COLON SURGERY  2013    Ace Mott MD    COLONOSCOPY N/A  3/21/2018    Procedure: COLONOSCOPY;  Surgeon: Terry Mott MD;  Location: [facility] ENDO (4TH FLR);  Service: Endoscopy;  Laterality: N/A;    COLONOSCOPY N/A 4/12/2019    Procedure: COLONOSCOPY;  Surgeon: John Mantilla MD;  Location: [facility] ENDO (4TH FLR);  Service: Endoscopy;  Laterality: N/A;    COLONOSCOPY N/A 5/31/2022    Procedure: COLONOSCOPY;  Surgeon: MEDINA Farris MD;  Location: [facility] ENDO (4TH FLR);  Service: Endoscopy;  Laterality: N/A;  fully vacc-inst portal-tb    ENDOBRONCHIAL ULTRASOUND Bilateral 4/30/2024    Procedure: ENDOBRONCHIAL ULTRASOUND (EBUS);  Surgeon: Naveed Aguero MD;  Location[facility] OR;  Service: Pulmonary;  Laterality: Bilateral;    scrotal abscess removal         Review of patient's allergies indicates:   Allergen Reactions    Brilinta [ticagrelor] Itching    Lisinopril      Other reaction(s): cough    Metoprolol succinate Rash       Current Facility-Administered Medications on File Prior to Encounter   Medication    dextrose 50% injection 12.5 g    dextrose 50% injection 25 g    insulin regular injection 0-8 Units     Current Outpatient Medications on File Prior to Encounter   Medication Sig    albuterol (PROVENTIL/VENTOLIN HFA) 90 mcg/actuation inhaler Inhale 2 puffs into the lungs every 4 (four) hours as needed for Wheezing.    amitriptyline (ELAVIL) 25 MG tablet Take 1 tablet (25 mg total) by mouth once daily.    aspirin (ECOTRIN) 81 MG EC tablet Take 81 mg by mouth once daily.     atorvastatin (LIPITOR) 80 MG tablet TAKE 1 TABLET (80 MG TOTAL) BY MOUTH ONCE DAILY.    BETA-CAROTENE,A, W-C & E/MIN (OCUVITE ORAL) Take 1 capsule by mouth once daily.     budesonide-glycopyr-formoterol (BREZTRI AEROSPHERE) 160-9-4.8 mcg/actuation HFAA Inhale 2 puffs into the lungs 2 (two) times a day.    echinacea 400 mg Cap Take 1 capsule by mouth once daily.     fluticasone propionate (FLONASE) 50 mcg/actuation nasal spray Spray 2 sprays (100 mcg total) in Each Nostril once daily.     latanoprost 0.005 % ophthalmic solution Place 1 drop into both eyes once daily.     losartan (COZAAR) 100 MG tablet Take 1 tablet (100 mg total) by mouth once daily.    mirtazapine (REMERON) 7.5 MG Tab Take 1 tablet (7.5 mg total) by mouth every evening.    omeprazole (PRILOSEC) 20 MG capsule Take 1 capsule (20 mg total) by mouth once daily.    roflumilast (DALIRESP) 500 mcg Tab Take 1 tablet (500 mcg total) by mouth once daily.    senna (SENOKOT) 8.6 mg tablet Take 1 tablet by mouth once daily.    traZODone (DESYREL) 50 MG tablet Take 1 tablet (50 mg total) by mouth every evening.    albuterol (ACCUNEB) 0.63 mg/3 mL Nebu Inhale 3 mLs (0.63 mg total) by nebulization every 6 (six) hours as needed (if symptoms failed to improve with inhaler). Rescue    latanoprost 0.005 % ophthalmic solution INSTILL 1 DROP INTO BOTH EYES AT BEDTIME    nitroGLYCERIN (NITROSTAT) 0.4 MG SL tablet Place 1 tablet (0.4 mg total) under the tongue every 5 (five) minutes as needed.    nystatin (MYCOSTATIN) 100,000 unit/mL suspension Take 5 mLs (500,000 Units total) by mouth 4 (four) times daily.    predniSONE (DELTASONE) 10 MG tablet After completing 40mg once daily for 14 days, take 30mg by mouth for 7 days, then take 20mg for 7 days, then take 10mg for 7 days then stop (Patient not taking: Reported on 12/10/2024)     Family History       Problem Relation (Age of Onset)    Heart attack Mother    Heart disease Mother    Hypertension Mother    No Known Problems Sister, Daughter          Tobacco Use    Smoking status: Former     Current packs/day: 0.00     Average packs/day: 1 pack/day for 40.0 years (40.0 ttl pk-yrs)     Types: Cigarettes     Start date: 8/15/1972     Quit date: 8/15/2012     Years since quittin.3     Passive exposure: Never    Smokeless tobacco: Never   Substance and Sexual Activity    Alcohol use: Yes     Alcohol/week: 3.0 standard drinks of alcohol     Types: 3 Cans of beer per week     Comment: maybe 2-3  beers weekly     Drug use: No    Sexual activity: Yes     Partners: Female     Review of Systems   Constitutional:  Positive for fatigue.   Respiratory:  Positive for shortness of breath.    Cardiovascular:  Negative for chest pain.     Objective:     Vital Signs (Most Recent):  Temp: 97.7 °F (36.5 °C) (12/28/24 0716)  Pulse: 88 (12/28/24 0716)  Resp: 16 (12/28/24 0716)  BP: 126/82 (12/28/24 0716)  SpO2: 98 % (12/28/24 0716) Vital Signs (24h Range):  Temp:  [97.7 °F (36.5 °C)-98.5 °F (36.9 °C)] 97.7 °F (36.5 °C)  Pulse:  [74-94] 88  Resp:  [16-24] 16  SpO2:  [95 %-100 %] 98 %  BP: (126-173)/(72-98) 126/82     Weight: 85.8 kg (189 lb 2.5 oz)  Body mass index is 27.93 kg/m².     Physical Exam  Vitals reviewed.   Constitutional:       Appearance: Normal appearance.   HENT:      Head: Normocephalic and atraumatic.   Eyes:      General: No scleral icterus.     Conjunctiva/sclera: Conjunctivae normal.   Pulmonary:      Effort: Pulmonary effort is normal. No respiratory distress.   Skin:     Coloration: Skin is not jaundiced.      Findings: No erythema.   Neurological:      General: No focal deficit present.      Mental Status: He is alert and oriented to person, place, and time.   Psychiatric:         Mood and Affect: Mood normal.         Behavior: Behavior normal.                Significant Labs: All pertinent labs within the past 24 hours have been reviewed.    Significant Imaging: I have reviewed all pertinent imaging results/findings within the past 24 hours.  Assessment/Plan:     * Pneumonitis  Recently treated with steroids and antibiotics, patient with SOB  Denies fevers and chills  CXR slightly worse than prior however no leukocytosis  Started on PO prednisone, holding off on antibiotics right now      Adenocarcinoma, lung, left  Follows as outpatient      Chronic obstructive pulmonary disease  NC 2L as needed  PRN duonebs  PRN inhaler      CAD (coronary artery disease)        Restless leg syndrome  Continue home meds      HTN  (hypertension), benign  Continue home meds      GERD (gastroesophageal reflux disease)  Continue home meds      Centrilobular emphysema          VTE Risk Mitigation (From admission, onward)           Ordered     enoxaparin injection 40 mg  Daily         12/27/24 2301     IP VTE HIGH RISK PATIENT  Once         12/27/24 2301     Place sequential compression device  Until discontinued         12/27/24 2301                         The attending portion of this evaluation, treatment, and documentation was performed per Zahida Garcia MD via Telemedicine AudioVisual using the secure [employer] software platform with 2 way audio/video. The provider was located off-site and the patient is located in the hospital. The aforementioned video software was utilized to document the relevant history and physical exam            Zahida Garcia MD  Department of Hospital Medicine   [facility] - Transplant Stepdown

## 2024-12-28 NOTE — PROGRESS NOTES
"[facility] - Transplant Stepdown  Hematology/Oncology  Progress Note    Patient Name: Jordin Allen Jr.  Admission Date: 12/27/2024  Hospital Length of Stay: 1 days  Code Status: Full Code     Subjective:     HPI:  Per H&P: "Jordin Allen Jr. is a 79 y.o. male with PMHx of lung cancer, finished chemo 6 months ago but now on immunotherapy presenting with cough and congestion. Patient recently diagnosed with pneumonitis and treated with steroids. Patient was concerned this may be re-emergence of pneumonitis at which time he called his oncologist who recommended to be evaluated in the ED. Patient states he has been using more oxygen through his oxygen at home and has been experiencing dyspnea on mild exertion.  Patient denies fever or chills at home.  CXR worse than prior with LLL opacities, stable on 2L NC. "    Cancer History (taken from Dr. 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 Completed CRT (5/8/24-6/18/24) with weekly carboplatin/paclitaxel (5/9/24-6/18/24). Currently getting consolidation durvalumab (started on 7/18/24, received 2 cycles) but held from 9/26/24-12/6/24 due to concern for radiation pneumonitis.    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    10/15/2024 CT chest demonstrated worsening of left-sided pulmonary process likely representing radiation pneumonitis as well as interval development of soft tissue density nodules in both lungs. Durvalumab subsequently held and patient was started on a course of steroids. His respiratory status has since improved (no longer required O2, exercise tolerance increased). Repeat imaging (CT C, 12/3/24) demonstrates interval resolution of irregular nodules in the right upper and " left lower lobes, apparent stability in the left hilar malignancy (noting this disease is difficult to visualize), and interval development of a 2.2 x 1.1 cm spiculated nodule in the central LLL. Regarding the first 2 findings, as well as improvement in clinical status, favor that this represents improvement of likely radiation pneumonitis. Regarding the last finding, unclear if this represents possible radiation change given location or recurrent malignancy (somewhat concerning given spiculated appearance). Given resolution of pneumonitis, it was decided to rechallenge with durvalumab at this time (noting this guidance is generally for immunotherapy induced pneumonitis rather than radiation induced pneumonitis).     Resumed on Durvalumab consolidation with C3 on 12/6/2024. C4 anticipated on 1/3/2025 with repeat CT chest around 1/28/2025 for short interval.      Interval History: No acute events overnight. Oxygenation stable on 2L NC. Not currently in respiratory distress, though coarse breath sounds noted on left. K down trending and 2.9 today. Patient c/o left lateral chest wall pain and HA that is chronic. He attributes this to brain mets and reports to alternate between tylenol and ibuprofen at home. Denies n/v, chest pain/tightness, f/c.     Oncology Treatment Plan:   OP DURVALUMAB 1500 MG Q4W    Medications:  Continuous Infusions:  Scheduled Meds:   albuterol-ipratropium  3 mL Nebulization Q4H    amitriptyline  25 mg Oral Daily    aspirin  81 mg Oral Daily    atorvastatin  80 mg Oral Daily    enoxparin  40 mg Subcutaneous Daily    fluticasone propionate  2 spray Each Nostril Daily    latanoprost  1 drop Both Eyes Daily    losartan  100 mg Oral Daily    mirtazapine  7.5 mg Oral QHS    nystatin  5 mL Oral QID    pantoprazole  40 mg Oral Daily    potassium chloride  40 mEq Oral BID    predniSONE  40 mg Oral Daily    roflumilast  1 tablet Oral Daily    senna  1 tablet Oral Daily    traZODone  50 mg Oral QHS      PRN Meds:  Current Facility-Administered Medications:     acetaminophen, 650 mg, Oral, Q4H PRN    albuterol, 1 puff, Inhalation, Q4H PRN    dextrose 50%, 12.5 g, Intravenous, PRN    dextrose 50%, 25 g, Intravenous, PRN    glucagon (human recombinant), 1 mg, Intramuscular, PRN    glucose, 16 g, Oral, PRN    glucose, 24 g, Oral, PRN    guaiFENesin 100 mg/5 ml, 200 mg, Oral, Q4H PRN    ibuprofen, 600 mg, Oral, Q6H PRN    melatonin, 6 mg, Oral, Nightly PRN    naloxone, 0.02 mg, Intravenous, PRN    ondansetron, 8 mg, Oral, Q8H PRN    sodium chloride 0.9%, 10 mL, Intravenous, Q12H PRN     Review of Systems  Objective:     Vital Signs (Most Recent):  Temp: 97.7 °F (36.5 °C) (12/28/24 0716)  Pulse: 68 (12/28/24 0847)  Resp: (!) 22 (12/28/24 0847)  BP: 126/82 (12/28/24 0716)  SpO2: 95 % (12/28/24 0847) Vital Signs (24h Range):  Temp:  [97.7 °F (36.5 °C)-98.5 °F (36.9 °C)] 97.7 °F (36.5 °C)  Pulse:  [68-94] 68  Resp:  [16-24] 22  SpO2:  [95 %-100 %] 95 %  BP: (126-173)/(72-98) 126/82     Weight: 85.8 kg (189 lb 2.5 oz)  Body mass index is 27.93 kg/m².  Body surface area is 2.04 meters squared.      Intake/Output Summary (Last 24 hours) at 12/28/2024 0955  Last data filed at 12/27/2024 2355  Gross per 24 hour   Intake 1100 ml   Output 400 ml   Net 700 ml        Physical Exam  Vitals reviewed.   Constitutional:       Appearance: Normal appearance.   HENT:      Head: Normocephalic and atraumatic.   Eyes:      General: No scleral icterus.     Conjunctiva/sclera: Conjunctivae normal.   Cardiovascular:      Rate and Rhythm: Normal rate and regular rhythm.      Pulses: Normal pulses.      Heart sounds: Normal heart sounds.   Pulmonary:      Effort: Pulmonary effort is normal. No respiratory distress.      Comments: Coarse breath sounds on left  Chest:      Chest wall: No tenderness.   Abdominal:      Palpations: Abdomen is soft.      Tenderness: There is no abdominal tenderness.   Skin:     Coloration: Skin is not jaundiced.       Findings: No erythema.   Neurological:      General: No focal deficit present.      Mental Status: He is alert and oriented to person, place, and time.   Psychiatric:         Mood and Affect: Mood normal.         Behavior: Behavior normal.          Significant Labs:   CBC:   Recent Labs   Lab 12/27/24 1859 12/27/24 1918 12/28/24  0619   WBC 8.72  --  7.70   HGB 11.4*  --  11.9*   HCT 35.8* 35* 36.3*   *  --  141*    and CMP:   Recent Labs   Lab 12/27/24 1859 12/28/24  0619   * 142   K 3.1* 2.9*    106   CO2 33* 25   * 146*   BUN 18 14   CREATININE 1.1 1.0   CALCIUM 9.1 9.2   PROT 6.1 6.4   ALBUMIN 3.1* 3.2*   BILITOT 0.6 0.7   ALKPHOS 94 95   AST 13 13   ALT 9* 11   ANIONGAP 8 11       Diagnostic Results:  I have reviewed all pertinent imaging results/findings within the past 24 hours.  Assessment/Plan:     * Pneumonitis  80 y/o M with PMHx of stage IIIa adenocarcinoma of the left hilum, COPD, CAD, HTN, GERD, and SHOLA admitted for worsening dyspnea on exertion with increase in O2 requirement. Patient was previously getting consolidation durvalumab for his lung CA, which started on 7/18/2024. This was subsequently held from 9/26/24-12/6/24 due to concern for radiation pneumonitis. He was treated with a 5-week steroid taper and rechallenged with Durvalumab on 12/6 after notable improvement in symptom presentation and CT chest on 12/3/2024. He began to feel short of breath with a dry cough shortly after and presents for hospitalization due to worsening of symptoms over the last week. Reports similarity in character to pneumonitis before. No baseline O2 requirement but has been using prn oxygen consistently. Denies f/c, CP, chest pressure/tightness, pleuritic pain, sputum production with cough. CXR worse than prior with LLL opacities. Currently stable on 2L NC. Patient afebrile with no leukocytosis. Low index concern for infectious etiology at this time.    Plan  - Will initiate prednisone  taper, pt given 40 mg overnight and 60 mg this AM (12/28)  - 60 mg x4 days starting 12/29/2024, then 50 mg x4 days, then 40 mg x4 days, then 30 mg x4 days, then 20 mg x4 days, then 10 mg x4days (EED 1/21/2025)  - CTA chest to r/o PE  - Tessalon perles and Mucinex DM prn for persistent cough    Hypokalemia  K 2.9. Will ctm with daily labs and replete as needed.    Metastasis to brain  Reporting chronic headaches attributed to brain mets. Continue alternating between tylenol and ibuprofen prn.    Adenocarcinoma, lung, left  Stage IV ([id number]) adenocarcinoma of the L hilum (PD-L1 35%, EGFR exon 20 insertion), diagnosed on 3/21/2024 as stage IIIa disease. Completed CRT (5/8/24-6/18/24) with weekly carboplatin/paclitaxel (5/9/24-6/18/24). Started on consolidation durvalumab (started on 7/18/24, received 2 cycles) but held from 9/26/24-12/6/24 as 10/15/2024 CT chest demonstrated worsening of left-sided pulmonary process likely representing radiation pneumonitis as well as interval development of soft tissue density nodules in both lungs. Repeat imaging (CT C, 12/3/24) following treatment with steroids demonstrates favored improvement of pneumonitis given improvement in clinical status. However, possible radiation changes revealed given location or recurrent malignancy. Durvalumab rechallenged on 12/6/2024. The patient is followed by Dr. Ponce, who is aware of the patient's present hospitalization.     Chronic obstructive pulmonary disease  Can consider COPD exacerbation for symptom presentation and increase in O2 requirement.    Plan  - Duonebs q4hrs while awake  - Continue home roflumilast  - Already being treated with steroids, continue       CAD (coronary artery disease)  Continue ASA 81 mg and Lipitor 80 mg.    HTN (hypertension), benign  Continue home Losartan 100 mg    GERD (gastroesophageal reflux disease)  Continue Pantoprazole 40 mg daily             Katherine Lawrence,   Hematology/Oncology  [facility] - Transplant  Stepdown

## 2024-12-28 NOTE — HPI
"Per H&P: "Jordin Allen Jr. is a 79 y.o. male with PMHx of lung cancer, finished chemo 6 months ago but now on immunotherapy presenting with cough and congestion. Patient recently diagnosed with pneumonitis and treated with steroids. Patient was concerned this may be re-emergence of pneumonitis at which time he called his oncologist who recommended to be evaluated in the ED. Patient states he has been using more oxygen through his oxygen at home and has been experiencing dyspnea on mild exertion.  Patient denies fever or chills at home.  CXR worse than prior with LLL opacities, stable on 2L NC. "    Cancer History (taken from Dr. Ponce's note on 12/6:  Stage IV ([id number]) adenocarcinoma of the L hilum (PD-L1 35%, EGFR exon 20 insertion), diagnosed on 3/21/2024 as stage IIIa disease. Completed CRT (5/8/24-6/18/24) with weekly carboplatin/paclitaxel (5/9/24-6/18/24). Currently getting consolidation durvalumab (started on 7/18/24, received 2 cycles) but held from 9/26/24-12/6/24 due to concern for radiation pneumonitis.    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    10/15/2024 CT chest demonstrated worsening of left-sided pulmonary process likely representing radiation pneumonitis as well as interval development of soft tissue density nodules in both lungs. Durvalumab subsequently held and patient was started on a course of steroids. His respiratory status has since improved (no longer required O2, exercise tolerance increased). Repeat imaging (CT C, 12/3/24) demonstrates interval resolution of irregular nodules in the right upper and left lower lobes, apparent stability in the left hilar malignancy (noting this disease is difficult to visualize), and interval development of a 2.2 x 1.1 cm spiculated nodule in the central LLL. Regarding the first 2 " findings, as well as improvement in clinical status, favor that this represents improvement of likely radiation pneumonitis. Regarding the last finding, unclear if this represents possible radiation change given location or recurrent malignancy (somewhat concerning given spiculated appearance). Given resolution of pneumonitis, it was decided to rechallenge with durvalumab at this time (noting this guidance is generally for immunotherapy induced pneumonitis rather than radiation induced pneumonitis).     Resumed on Durvalumab consolidation with C3 on 12/6/2024. C4 anticipated on 1/3/2025 with repeat CT chest around 1/28/2025 for short interval.

## 2024-12-28 NOTE — ASSESSMENT & PLAN NOTE
-- DO NOT REPLY / DO NOT REPLY ALL --  -- Message is from [facility] [employer] ([facility]) --    General Patient Message: Patient returning call from the office to discuss test results.     Caller Information       Type Contact Phone/Fax    11/03/2023 09:08 AM CDT Phone (Outgoing) Deonna Healy (Self) [phone] (M)    Left Message     11/03/2023 10:07 AM CDT Phone (Incoming) Deonna Healy (Self) [phone] (M)        Alternative phone number: no    Can a detailed message be left? Yes    Message Turnaround:     Is it Working Hours? Yes - Working Hours     [place]:    Please give this turnaround time to the caller:   \"This message will be sent to [state Provider's name]. The clinical team will fulfill your request as soon as they review your message.\"                 Can consider COPD exacerbation for symptom presentation and increase in O2 requirement.    Plan  - Duonebs q4hrs while awake  - Continue home roflumilast  - Already being treated with steroids, continue

## 2024-12-28 NOTE — ASSESSMENT & PLAN NOTE
Reporting chronic headaches attributed to brain mets. Continue alternating between tylenol and ibuprofen prn.

## 2024-12-28 NOTE — ED NOTES
Telemetry Verification   Patient placed on Telemetry Box  Verified with War Room  Box # [id number]   Monitor Tech    Rate 102   Rhythm SR

## 2024-12-28 NOTE — PLAN OF CARE
Problem: Adult Inpatient Plan of Care  Goal: Plan of Care Review  Flowsheets (Taken 12/28/2024 0513)  Plan of Care Reviewed With: patient  Goal: Patient-Specific Goal (Individualized)  Flowsheets (Taken 12/28/2024 0513)  Individualized Care Needs: keep pt comfortable, provide fluids  Anxieties, Fears or Concerns: none stated  Patient/Family-Specific Goals (Include Timeframe): keep family updated  Goal: Optimal Comfort and Wellbeing  Reactivated  Intervention: Monitor Pain and Promote Comfort  Flowsheets (Taken 12/28/2024 0513)  Pain Management Interventions:   care clustered   pain management plan reviewed with patient/caregiver   pillow support provided   position adjusted   quiet environment facilitated   relaxation techniques promoted  Intervention: Provide Person-Centered Care  Flowsheets (Taken 12/28/2024 0513)  Trust Relationship/Rapport:   care explained   choices provided   questions encouraged   reassurance provided   emotional support provided   thoughts/feelings acknowledged   empathic listening provided   questions answered     Problem: Fall Injury Risk  Goal: Absence of Fall and Fall-Related Injury  Reactivated  Intervention: Identify and Manage Contributors  Flowsheets (Taken 12/28/2024 0513)  Self-Care Promotion:   independence encouraged   BADL personal objects within reach   BADL personal routines maintained  Medication Review/Management: medications reviewed  Intervention: Promote Injury-Free Environment  Flowsheets (Taken 12/28/2024 0513)  Safety Promotion/Fall Prevention:   assistive device/personal item within reach   bed alarm set   Fall Risk reviewed with patient/family   instructed to call staff for mobility   lighting adjusted   medications reviewed   nonskid shoes/socks when out of bed   patient expresses understanding of fall risk and prevention   Supervised toileting - stay within arms reach   side rails raised x 3     Problem: Pain Acute  Goal: Optimal Pain Control and  Function  Reactivated  Intervention: Develop Pain Management Plan  Flowsheets (Taken 12/28/2024 0513)  Pain Management Interventions:   care clustered   pain management plan reviewed with patient/caregiver   pillow support provided   position adjusted   quiet environment facilitated   relaxation techniques promoted  Intervention: Prevent or Manage Pain  Flowsheets (Taken 12/28/2024 0513)  Sleep/Rest Enhancement:   awakenings minimized   regular sleep/rest pattern promoted  Sensory Stimulation Regulation:   quiet environment promoted   care clustered  Bowel Elimination Promotion: adequate fluid intake promoted  Medication Review/Management: medications reviewed  Intervention: Optimize Psychosocial Wellbeing  Flowsheets (Taken 12/28/2024 0513)  Supportive Measures:   verbalization of feelings encouraged   active listening utilized  Diversional Activities: television

## 2024-12-28 NOTE — PLAN OF CARE
Patient admitted overnight from ED for cough/SOB. On home 2L NC - sats 94-98%. Potassium 2.9 - replaced PO. Prednisone taper started at 60mg - to taper by 10mg Q4days. Tylenol/Ibuprofen alternated for c/o chronic HA. CTA ordered to r/o PE. Patient OOB to chair, up ad hans.

## 2024-12-28 NOTE — PROVIDER PROGRESS NOTES - EMERGENCY DEPT.
Encounter Date: 12/27/2024    ED Physician Progress Notes          [facility] [place] of Care Note  7:11 PM 12/28/2024  Jordin Allen Jr. is a 79 y.o. male who presented to the ED on 12/27/2024 and has been managed by , who reports patient C/O SOB. I assumed care of patient from off-going ED physician team at 7:11 PM pending UA and labs.    On my evaluation, Jordin Allen Jr. appears well and is hemodynamically stable. Thus far, Jordin Allen Jr. has received:  Medications   potassium bicarbonate disintegrating tablet 20 mEq (20 mEq Oral Not Given 12/27/24 1945)   albuterol inhaler 1 puff (has no administration in time range)   amitriptyline tablet 25 mg (has no administration in time range)   aspirin EC tablet 81 mg (has no administration in time range)   atorvastatin tablet 80 mg (has no administration in time range)   fluticasone propionate 50 mcg/actuation nasal spray 100 mcg (has no administration in time range)   latanoprost 0.005 % ophthalmic solution 1 drop (has no administration in time range)   losartan tablet 100 mg (has no administration in time range)   mirtazapine tablet 7.5 mg (has no administration in time range)   nystatin 100,000 unit/mL suspension 500,000 Units (has no administration in time range)   pantoprazole EC tablet 40 mg (has no administration in time range)   roflumilast (DALIRESP) tablet 500 mcg (has no administration in time range)   traZODone tablet 50 mg (has no administration in time range)   senna tablet 1 tablet (has no administration in time range)   sodium chloride 0.9% flush 10 mL (has no administration in time range)   naloxone 0.4 mg/mL injection 0.02 mg (has no administration in time range)   glucose chewable tablet 16 g (has no administration in time range)   glucose chewable tablet 24 g (has no administration in time range)   dextrose 50% injection 12.5 g (has no administration in time range)   dextrose 50% injection 25 g (has no administration in time range)  "  glucagon (human recombinant) injection 1 mg (has no administration in time range)   enoxaparin injection 40 mg (has no administration in time range)   acetaminophen tablet 650 mg (has no administration in time range)   ondansetron disintegrating tablet 8 mg (has no administration in time range)   albuterol-ipratropium 2.5 mg-0.5 mg/3 mL nebulizer solution 3 mL (3 mLs Nebulization Given 12/28/24 0448)   melatonin tablet 6 mg (has no administration in time range)   predniSONE tablet 40 mg (40 mg Oral Given 12/27/24 2352)   guaiFENesin 100 mg/5 ml syrup 200 mg (has no administration in time range)   potassium chloride 10 mEq in 100 mL IVPB (0 mEq Intravenous Stopped 12/27/24 2215)   sodium chloride 0.9% bolus 1,000 mL 1,000 mL (0 mLs Intravenous Stopped 12/27/24 2215)   ondansetron injection 4 mg (4 mg Intravenous Given 12/27/24 2041)   hydrALAZINE tablet 25 mg (25 mg Oral Given 12/27/24 2042)   fluticasone propionate 50 mcg/actuation nasal spray 100 mcg (100 mcg Each Nostril Given 12/28/24 0352)       On my exam, I appreciate:  BP (!) 159/81 (BP Location: Left arm, Patient Position: Lying)   Pulse 92   Temp 98.5 °F (36.9 °C) (Oral)   Resp 18   Ht 5' 9" (1.753 m)   Wt 85.8 kg (189 lb 2.5 oz)   SpO2 95%   BMI 27.93 kg/m²     ED Course as of 12/28/24 0604   Fri Dec 27, 2024   1941 Gran %(!): 78.0 [JL]   1946 ISTAT PROCEDURE(!!)  Respiratory acidosis metabolic compensation [JL]   1947 X-Ray Chest 1 View  As per interpretation of chest x-ray:  Left lower lung opacities worsening from prior study.  Possible recrudescence of pneumonitis versus superimposed infection [JL]      ED Course User Index  [JL] Tammy Soriano MD        Additional ED course:  Patient's cardiac enzymes are normal. Recommend admission for worsening pneumonitis. Discussed case with heme/onc who admitted the patient.    Disposition: admit  I have discussed and counseled Jordin Allen Jr. regarding exam, results, diagnosis, treatment, and " plan.  ______________________  Milady Toledo MD   Emergency Medicine Physician  12/27/2024

## 2024-12-28 NOTE — SUBJECTIVE & OBJECTIVE
Past Medical History:   Diagnosis Date    Benign neoplasm of colon 10/01/2013    Bronchitis chronic     CAD (coronary artery disease) 10/31/2013    COPD (chronic obstructive pulmonary disease)     Emphysema of lung     GERD (gastroesophageal reflux disease)     Hx of colonic polyps     Hypertension     NAFLD (nonalcoholic fatty liver disease) 03/27/2017    SHOLA on CPAP     RLS (restless legs syndrome)     Screen for colon cancer 08/30/2013       Past Surgical History:   Procedure Laterality Date    bilateral foot surgery  1993    CARDIAC CATHETERIZATION  2013    x1    CATARACT EXTRACTION, BILATERAL      COLON SURGERY  2013    Ace Mott MD    COLONOSCOPY N/A 3/21/2018    Procedure: COLONOSCOPY;  Surgeon: Terry Mott MD;  Location: [facility] ENDO (4TH FLR);  Service: Endoscopy;  Laterality: N/A;    COLONOSCOPY N/A 4/12/2019    Procedure: COLONOSCOPY;  Surgeon: John Mantilla MD;  Location: [facility] ENDO ([facility] FLR);  Service: Endoscopy;  Laterality: N/A;    COLONOSCOPY N/A 5/31/2022    Procedure: COLONOSCOPY;  Surgeon: MEDINA Farris MD;  [facility]  Service: Endoscopy;  Laterality: N/A;  fully vacc-inst portal-tb    ENDOBRONCHIAL ULTRASOUND Bilateral 4/30/2024    Procedure: ENDOBRONCHIAL ULTRASOUND (EBUS);  Surgeon: Naveed Aguero MD;  [facility];  Service: Pulmonary;  Laterality: Bilateral;    scrotal abscess removal         Review of patient's allergies indicates:   Allergen Reactions    Brilinta [ticagrelor] Itching    Lisinopril      Other reaction(s): cough    Metoprolol succinate Rash       Current Facility-Administered Medications on File Prior to Encounter   Medication    dextrose 50% injection 12.5 g    dextrose 50% injection 25 g    insulin regular injection 0-8 Units     Current Outpatient Medications on File Prior to Encounter   Medication Sig    albuterol (PROVENTIL/VENTOLIN HFA) 90 mcg/actuation inhaler Inhale 2 puffs into the lungs every 4 (four) hours as needed for  Wheezing.    amitriptyline (ELAVIL) 25 MG tablet Take 1 tablet (25 mg total) by mouth once daily.    aspirin (ECOTRIN) 81 MG EC tablet Take 81 mg by mouth once daily.     atorvastatin (LIPITOR) 80 MG tablet TAKE 1 TABLET (80 MG TOTAL) BY MOUTH ONCE DAILY.    BETA-CAROTENE,A, W-C & E/MIN (OCUVITE ORAL) Take 1 capsule by mouth once daily.     budesonide-glycopyr-formoterol (BREZTRI AEROSPHERE) 160-9-4.8 mcg/actuation HFAA Inhale 2 puffs into the lungs 2 (two) times a day.    echinacea 400 mg Cap Take 1 capsule by mouth once daily.     fluticasone propionate (FLONASE) 50 mcg/actuation nasal spray Spray 2 sprays (100 mcg total) in Each Nostril once daily.    latanoprost 0.005 % ophthalmic solution Place 1 drop into both eyes once daily.     losartan (COZAAR) 100 MG tablet Take 1 tablet (100 mg total) by mouth once daily.    mirtazapine (REMERON) 7.5 MG Tab Take 1 tablet (7.5 mg total) by mouth every evening.    omeprazole (PRILOSEC) 20 MG capsule Take 1 capsule (20 mg total) by mouth once daily.    roflumilast (DALIRESP) 500 mcg Tab Take 1 tablet (500 mcg total) by mouth once daily.    senna (SENOKOT) 8.6 mg tablet Take 1 tablet by mouth once daily.    traZODone (DESYREL) 50 MG tablet Take 1 tablet (50 mg total) by mouth every evening.    albuterol (ACCUNEB) 0.63 mg/3 mL Nebu Inhale 3 mLs (0.63 mg total) by nebulization every 6 (six) hours as needed (if symptoms failed to improve with inhaler). Rescue    latanoprost 0.005 % ophthalmic solution INSTILL 1 DROP INTO BOTH EYES AT BEDTIME    nitroGLYCERIN (NITROSTAT) 0.4 MG SL tablet Place 1 tablet (0.4 mg total) under the tongue every 5 (five) minutes as needed.    nystatin (MYCOSTATIN) 100,000 unit/mL suspension Take 5 mLs (500,000 Units total) by mouth 4 (four) times daily.    predniSONE (DELTASONE) 10 MG tablet After completing 40mg once daily for 14 days, take 30mg by mouth for 7 days, then take 20mg for 7 days, then take 10mg for 7 days then stop (Patient not taking:  Reported on 12/10/2024)     Family History       Problem Relation (Age of Onset)    Heart attack Mother    Heart disease Mother    Hypertension Mother    No Known Problems Sister, Daughter          Tobacco Use    Smoking status: Former     Current packs/day: 0.00     Average packs/day: 1 pack/day for 40.0 years (40.0 ttl pk-yrs)     Types: Cigarettes     Start date: 8/15/1972     Quit date: 8/15/2012     Years since quittin.3     Passive exposure: Never    Smokeless tobacco: Never   Substance and Sexual Activity    Alcohol use: Yes     Alcohol/week: 3.0 standard drinks of alcohol     Types: 3 Cans of beer per week     Comment: maybe 2-3  beers weekly    Drug use: No    Sexual activity: Yes     Partners: Female     Review of Systems   Constitutional:  Positive for fatigue.   Respiratory:  Positive for shortness of breath.    Cardiovascular:  Negative for chest pain.     Objective:     Vital Signs (Most Recent):  Temp: 97.7 °F (36.5 °C) (24 07)  Pulse: 88 (24 07)  Resp: 16 (24)  BP: 126/82 (24 07)  SpO2: 98 % (24) Vital Signs (24h Range):  Temp:  [97.7 °F (36.5 °C)-98.5 °F (36.9 °C)] 97.7 °F (36.5 °C)  Pulse:  [74-94] 88  Resp:  [16-24] 16  SpO2:  [95 %-100 %] 98 %  BP: (126-173)/(72-98) 126/82     Weight: 85.8 kg (189 lb 2.5 oz)  Body mass index is 27.93 kg/m².     Physical Exam  Vitals reviewed.   Constitutional:       Appearance: Normal appearance.   HENT:      Head: Normocephalic and atraumatic.   Eyes:      General: No scleral icterus.     Conjunctiva/sclera: Conjunctivae normal.   Pulmonary:      Effort: Pulmonary effort is normal. No respiratory distress.   Skin:     Coloration: Skin is not jaundiced.      Findings: No erythema.   Neurological:      General: No focal deficit present.      Mental Status: He is alert and oriented to person, place, and time.   Psychiatric:         Mood and Affect: Mood normal.         Behavior: Behavior normal.                 Significant Labs: All pertinent labs within the past 24 hours have been reviewed.    Significant Imaging: I have reviewed all pertinent imaging results/findings within the past 24 hours.

## 2024-12-28 NOTE — ASSESSMENT & PLAN NOTE
Stage IV ([id number]) adenocarcinoma of the L hilum (PD-L1 35%, EGFR exon 20 insertion), diagnosed on 3/21/2024 as stage IIIa disease. Completed CRT (5/8/24-6/18/24) with weekly carboplatin/paclitaxel (5/9/24-6/18/24). Started on consolidation durvalumab (started on 7/18/24, received 2 cycles) but held from 9/26/24-12/6/24 as 10/15/2024 CT chest demonstrated worsening of left-sided pulmonary process likely representing radiation pneumonitis as well as interval development of soft tissue density nodules in both lungs. Repeat imaging (CT C, 12/3/24) following treatment with steroids demonstrates favored improvement of pneumonitis given improvement in clinical status. However, possible radiation changes revealed given location or recurrent malignancy. Durvalumab rechallenged on 12/6/2024. The patient is followed by Dr. Ponce, who is aware of the patient's present hospitalization.

## 2024-12-28 NOTE — ASSESSMENT & PLAN NOTE
Recently treated with steroids and antibiotics, patient with SOB  Denies fevers and chills  CXR slightly worse than prior however no leukocytosis  Started on PO prednisone, holding off on antibiotics right now

## 2024-12-28 NOTE — HPI
Jordin Allen . is a 79 y.o. male with PMHx of lung cancer, finished chemo 6 months ago but now on immunotherapy presenting with cough and congestion. Patient recently diagnosed with pneumonitis and treated with steroids. Patient was concerned this may be re-emergence of pneumonitis at which time he called his oncologist who recommended to be evaluated in the ED. Patient states he has been using more oxygen through his oxygen at home and has been experiencing dyspnea on mild exertion.  Patient denies fever or chills at home.  CXR worse than prior with LLL opacities, stable on 2L NC.

## 2024-12-28 NOTE — ED TRIAGE NOTES
Triage note:  Chief Complaint   Patient presents with    Shortness of Breath     From home via EMS with c/o increased SOB over the last few days.  Hx of lung cancer, finished chemo 6 mo ago, now on immunotherapy. Endorses cough and congestion. On PRN oxygen. Arrives on 2L NC     Review of patient's allergies indicates:   Allergen Reactions    Brilinta [ticagrelor] Itching    Lisinopril      Other reaction(s): cough    Metoprolol succinate Rash     Past Medical History:   Diagnosis Date    Benign neoplasm of colon 10/01/2013    Bronchitis chronic     CAD (coronary artery disease) 10/31/2013    COPD (chronic obstructive pulmonary disease)     Emphysema of lung     GERD (gastroesophageal reflux disease)     Hx of colonic polyps     Hypertension     NAFLD (nonalcoholic fatty liver disease) 03/27/2017    SHOLA on CPAP     RLS (restless legs syndrome)     Screen for colon cancer 08/30/2013

## 2024-12-28 NOTE — ASSESSMENT & PLAN NOTE
80 y/o M with PMHx of stage IIIa adenocarcinoma of the left hilum, COPD, CAD, HTN, GERD, and SHOLA admitted for worsening dyspnea on exertion with increase in O2 requirement. Patient was previously getting consolidation durvalumab for his lung CA, which started on 7/18/2024. This was subsequently held from 9/26/24-12/6/24 due to concern for radiation pneumonitis. He was treated with a 5-week steroid taper and rechallenged with Durvalumab on 12/6 after notable improvement in symptom presentation and CT chest on 12/3/2024. He began to feel short of breath with a dry cough shortly after and presents for hospitalization due to worsening of symptoms over the last week. Reports similarity in character to pneumonitis before. No baseline O2 requirement but has been using prn oxygen consistently. Denies f/c, CP, chest pressure/tightness, pleuritic pain, sputum production with cough. CXR worse than prior with LLL opacities. Currently stable on 2L NC. Patient afebrile with no leukocytosis. Low index concern for infectious etiology at this time.    Plan  - Will initiate prednisone taper, pt given 40 mg overnight and 60 mg this AM (12/28)  - 60 mg x4 days starting 12/29/2024, then 50 mg x4 days, then 40 mg x4 days, then 30 mg x4 days, then 20 mg x4 days, then 10 mg x4days (EED 1/21/2025)  - CTA chest to r/o PE  - Tessalon perles and Mucinex DM prn for persistent cough

## 2024-12-29 VITALS
SYSTOLIC BLOOD PRESSURE: 116 MMHG | HEIGHT: 69 IN | DIASTOLIC BLOOD PRESSURE: 64 MMHG | WEIGHT: 189.13 LBS | BODY MASS INDEX: 28.01 KG/M2 | HEART RATE: 92 BPM | OXYGEN SATURATION: 98 % | RESPIRATION RATE: 16 BRPM | TEMPERATURE: 98 F

## 2024-12-29 LAB
ALBUMIN SERPL BCP-MCNC: 3.1 G/DL (ref 3.5–5.2)
ALP SERPL-CCNC: 93 U/L (ref 40–150)
ALT SERPL W/O P-5'-P-CCNC: 7 U/L (ref 10–44)
ANION GAP SERPL CALC-SCNC: 9 MMOL/L (ref 8–16)
AST SERPL-CCNC: 11 U/L (ref 10–40)
BASOPHILS # BLD AUTO: 0.01 K/UL (ref 0–0.2)
BASOPHILS NFR BLD: 0.1 % (ref 0–1.9)
BILIRUB SERPL-MCNC: 0.4 MG/DL (ref 0.1–1)
BUN SERPL-MCNC: 25 MG/DL (ref 8–23)
CALCIUM SERPL-MCNC: 9.1 MG/DL (ref 8.7–10.5)
CHLORIDE SERPL-SCNC: 106 MMOL/L (ref 95–110)
CO2 SERPL-SCNC: 27 MMOL/L (ref 23–29)
CREAT SERPL-MCNC: 1.3 MG/DL (ref 0.5–1.4)
DIFFERENTIAL METHOD BLD: ABNORMAL
EOSINOPHIL # BLD AUTO: 0 K/UL (ref 0–0.5)
EOSINOPHIL NFR BLD: 0 % (ref 0–8)
ERYTHROCYTE [DISTWIDTH] IN BLOOD BY AUTOMATED COUNT: 15.2 % (ref 11.5–14.5)
EST. GFR  (NO RACE VARIABLE): 56.6 ML/MIN/1.73 M^2
GLUCOSE SERPL-MCNC: 125 MG/DL (ref 70–110)
HCT VFR BLD AUTO: 33.7 % (ref 40–54)
HGB BLD-MCNC: 11.2 G/DL (ref 14–18)
IMM GRANULOCYTES # BLD AUTO: 0.08 K/UL (ref 0–0.04)
IMM GRANULOCYTES NFR BLD AUTO: 0.7 % (ref 0–0.5)
LYMPHOCYTES # BLD AUTO: 0.4 K/UL (ref 1–4.8)
LYMPHOCYTES NFR BLD: 3.8 % (ref 18–48)
MAGNESIUM SERPL-MCNC: 2 MG/DL (ref 1.6–2.6)
MCH RBC QN AUTO: 28.1 PG (ref 27–31)
MCHC RBC AUTO-ENTMCNC: 33.2 G/DL (ref 32–36)
MCV RBC AUTO: 85 FL (ref 82–98)
MONOCYTES # BLD AUTO: 0.6 K/UL (ref 0.3–1)
MONOCYTES NFR BLD: 5.3 % (ref 4–15)
NEUTROPHILS # BLD AUTO: 10 K/UL (ref 1.8–7.7)
NEUTROPHILS NFR BLD: 90.1 % (ref 38–73)
NRBC BLD-RTO: 0 /100 WBC
PLATELET # BLD AUTO: 157 K/UL (ref 150–450)
PMV BLD AUTO: 11.1 FL (ref 9.2–12.9)
POTASSIUM SERPL-SCNC: 3.6 MMOL/L (ref 3.5–5.1)
PROT SERPL-MCNC: 6 G/DL (ref 6–8.4)
RBC # BLD AUTO: 3.98 M/UL (ref 4.6–6.2)
SODIUM SERPL-SCNC: 142 MMOL/L (ref 136–145)
WBC # BLD AUTO: 11.08 K/UL (ref 3.9–12.7)

## 2024-12-29 PROCEDURE — 94640 AIRWAY INHALATION TREATMENT: CPT

## 2024-12-29 PROCEDURE — 94761 N-INVAS EAR/PLS OXIMETRY MLT: CPT

## 2024-12-29 PROCEDURE — 63600175 PHARM REV CODE 636 W HCPCS

## 2024-12-29 PROCEDURE — 99239 HOSP IP/OBS DSCHRG MGMT >30: CPT | Mod: ,,, | Performed by: INTERNAL MEDICINE

## 2024-12-29 PROCEDURE — 25000003 PHARM REV CODE 250: Performed by: STUDENT IN AN ORGANIZED HEALTH CARE EDUCATION/TRAINING PROGRAM

## 2024-12-29 PROCEDURE — 25000242 PHARM REV CODE 250 ALT 637 W/ HCPCS

## 2024-12-29 PROCEDURE — 83735 ASSAY OF MAGNESIUM: CPT | Performed by: STUDENT IN AN ORGANIZED HEALTH CARE EDUCATION/TRAINING PROGRAM

## 2024-12-29 PROCEDURE — 85025 COMPLETE CBC W/AUTO DIFF WBC: CPT | Performed by: STUDENT IN AN ORGANIZED HEALTH CARE EDUCATION/TRAINING PROGRAM

## 2024-12-29 PROCEDURE — 36415 COLL VENOUS BLD VENIPUNCTURE: CPT | Performed by: STUDENT IN AN ORGANIZED HEALTH CARE EDUCATION/TRAINING PROGRAM

## 2024-12-29 PROCEDURE — 99900035 HC TECH TIME PER 15 MIN (STAT)

## 2024-12-29 PROCEDURE — 80053 COMPREHEN METABOLIC PANEL: CPT | Performed by: STUDENT IN AN ORGANIZED HEALTH CARE EDUCATION/TRAINING PROGRAM

## 2024-12-29 PROCEDURE — 27000221 HC OXYGEN, UP TO 24 HOURS

## 2024-12-29 RX ORDER — PREDNISONE 10 MG/1
TABLET ORAL
Qty: 72 TABLET | Refills: 0 | Status: SHIPPED | OUTPATIENT
Start: 2024-12-30 | End: 2025-01-21

## 2024-12-29 RX ORDER — PREDNISONE 10 MG/1
TABLET ORAL
Qty: 32 TABLET | Refills: 0 | Status: CANCELLED | OUTPATIENT
Start: 2024-12-30 | End: 2025-01-05

## 2024-12-29 RX ADMIN — IPRATROPIUM BROMIDE AND ALBUTEROL SULFATE 3 ML: 2.5; .5 SOLUTION RESPIRATORY (INHALATION) at 12:12

## 2024-12-29 RX ADMIN — PREDNISONE 60 MG: 20 TABLET ORAL at 08:12

## 2024-12-29 RX ADMIN — PANTOPRAZOLE SODIUM 40 MG: 40 TABLET, DELAYED RELEASE ORAL at 08:12

## 2024-12-29 RX ADMIN — LATANOPROST 1 DROP: 50 SOLUTION OPHTHALMIC at 08:12

## 2024-12-29 RX ADMIN — ROFLUMILAST 500 MCG: 500 TABLET ORAL at 08:12

## 2024-12-29 RX ADMIN — FLUTICASONE PROPIONATE 100 MCG: 50 SPRAY, METERED NASAL at 08:12

## 2024-12-29 RX ADMIN — IPRATROPIUM BROMIDE AND ALBUTEROL SULFATE 3 ML: 2.5; .5 SOLUTION RESPIRATORY (INHALATION) at 08:12

## 2024-12-29 RX ADMIN — AMITRIPTYLINE HYDROCHLORIDE 25 MG: 25 TABLET, FILM COATED ORAL at 08:12

## 2024-12-29 RX ADMIN — NYSTATIN 500000 UNITS: 100000 SUSPENSION ORAL at 08:12

## 2024-12-29 RX ADMIN — ASPIRIN 81 MG: 81 TABLET, COATED ORAL at 08:12

## 2024-12-29 RX ADMIN — NYSTATIN 500000 UNITS: 100000 SUSPENSION ORAL at 01:12

## 2024-12-29 RX ADMIN — LOSARTAN POTASSIUM 100 MG: 50 TABLET, FILM COATED ORAL at 08:12

## 2024-12-29 RX ADMIN — ATORVASTATIN CALCIUM 80 MG: 20 TABLET, FILM COATED ORAL at 08:12

## 2024-12-29 RX ADMIN — SENNOSIDES 1 TABLET: 8.6 TABLET, FILM COATED ORAL at 08:12

## 2024-12-29 NOTE — DISCHARGE SUMMARY
"[facility] - Transplant Stepdown  Hematology/Oncology  Discharge Summary      Patient Name: Jordin Allen Jr.  MRN: [id number]  Admission Date: 12/27/2024  Hospital Length of Stay: 2 days  Discharge Date and Time:  12/29/2024 12:11 PM  Attending Physician: Vivek Resendiz MD   Discharging Provider: aKtherine Lawrence DO  Primary Care Provider: Sierra Landry MD    HPI: Per H&P: "Jordin Allen Jr. is a 79 y.o. male with PMHx of lung cancer, finished chemo 6 months ago but now on immunotherapy presenting with cough and congestion. Patient recently diagnosed with pneumonitis and treated with steroids. Patient was concerned this may be re-emergence of pneumonitis at which time he called his oncologist who recommended to be evaluated in the ED. Patient states he has been using more oxygen through his oxygen at home and has been experiencing dyspnea on mild exertion.  Patient denies fever or chills at home.  CXR worse than prior with LLL opacities, stable on 2L NC. "    Cancer History (taken from Dr. Ponce's note on 12/6:  Stage IV ([id number]) adenocarcinoma of the L hilum (PD-L1 35%, EGFR exon 20 insertion), diagnosed on 3/21/2024 as stage IIIa disease. Completed CRT (5/8/24-6/18/24) with weekly carboplatin/paclitaxel (5/9/24-6/18/24). Currently getting consolidation durvalumab (started on 7/18/24, received 2 cycles) but held from 9/26/24-12/6/24 due to concern for radiation pneumonitis.    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    10/15/2024 CT chest demonstrated worsening of left-sided pulmonary process likely representing radiation pneumonitis as well as interval development of soft tissue density nodules in both lungs. Durvalumab subsequently held and patient was started on a course of steroids. His respiratory status has since improved (no " longer required O2, exercise tolerance increased). Repeat imaging (CT C, 12/3/24) demonstrates interval resolution of irregular nodules in the right upper and left lower lobes, apparent stability in the left hilar malignancy (noting this disease is difficult to visualize), and interval development of a 2.2 x 1.1 cm spiculated nodule in the central LLL. Regarding the first 2 findings, as well as improvement in clinical status, favor that this represents improvement of likely radiation pneumonitis. Regarding the last finding, unclear if this represents possible radiation change given location or recurrent malignancy (somewhat concerning given spiculated appearance). Given resolution of pneumonitis, it was decided to rechallenge with durvalumab at this time (noting this guidance is generally for immunotherapy induced pneumonitis rather than radiation induced pneumonitis).     Resumed on Durvalumab consolidation with C3 on 12/6/2024. C4 anticipated on 1/3/2025 with repeat CT chest around 1/28/2025 for short interval.      * No surgery found *     Hospital Course: Patient admitted for worsening shortness of breath and cough that started shortly after rechallenging Durvalumab, which patient takes as consolidative therapy for stage IV lung cancer. Symptoms similar in presentation to previously treated pneumonitis. No PE on CTA chest. Patient started on steroid taper beginning with oral Prednisone 60 mg with plan to decrease by 10 mg every 4 days. Initially required 2L NC but now breathing comfortably on RA. Mild ZACH noted on labs. Fluid hydration with water encouraged. Patient medically stable for discharge and will follow-up with Dr. Gay on 1/3/2025 as previously scheduled.    Goals of Care Treatment Preferences:  Code Status: Full Code          What is most important right now is to focus on remaining as independent as possible, symptom/pain control, improvement in condition but with limits to invasive therapies.   Accordingly, we have decided that the best plan to meet the patient's goals includes continuing with treatment.    Vitals:    12/29/24 1118   BP: 116/64   Pulse: 79   Resp: 14   Temp: 97.7 °F (36.5 °C)      Physical Exam  Vitals reviewed.   Constitutional:       Appearance: Normal appearance.   HENT:      Head: Normocephalic and atraumatic.   Eyes:      General: No scleral icterus.     Conjunctiva/sclera: Conjunctivae normal.   Cardiovascular:      Rate and Rhythm: Normal rate and regular rhythm.      Pulses: Normal pulses.      Heart sounds: Normal heart sounds.   Pulmonary:      Effort: Pulmonary effort is normal. No respiratory distress.   Chest:      Chest wall: No tenderness.   Abdominal:      Palpations: Abdomen is soft.      Tenderness: There is no abdominal tenderness.   Skin:     Coloration: Skin is not jaundiced.      Findings: No erythema.   Neurological:      General: No focal deficit present.      Mental Status: He is alert and oriented to person, place, and time.   Psychiatric:         Mood and Affect: Mood normal.         Behavior: Behavior normal.     Consults:     Significant Diagnostic Studies: Labs: CMP   Recent Labs   Lab 12/27/24 1859 12/28/24 0619 12/29/24  0534   * 142 142   K 3.1* 2.9* 3.6    106 106   CO2 33* 25 27   * 146* 125*   BUN 18 14 25*   CREATININE 1.1 1.0 1.3   CALCIUM 9.1 9.2 9.1   PROT 6.1 6.4 6.0   ALBUMIN 3.1* 3.2* 3.1*   BILITOT 0.6 0.7 0.4   ALKPHOS 94 95 93   AST 13 13 11   ALT 9* 11 7*   ANIONGAP 8 11 9    and CBC   Recent Labs   Lab 12/27/24 1859 12/27/24 1918 12/28/24 0619 12/29/24  0533   WBC 8.72  --  7.70 11.08   HGB 11.4*  --  11.9* 11.2*   HCT 35.8*   < > 36.3* 33.7*   *  --  141* 157    < > = values in this interval not displayed.       Pending Diagnostic Studies:       None          Final Active Diagnoses:    Diagnosis Date Noted POA    PRINCIPAL PROBLEM:  Pneumonitis [J98.4] 12/28/2024 Yes    Hypokalemia [E87.6] 12/28/2024 Unknown     Metastasis to brain [C79.31] 07/30/2024 Yes    Adenocarcinoma, lung, left [C34.92] 04/02/2024 Yes    Chronic obstructive pulmonary disease [J44.9]  Yes    CAD (coronary artery disease) [I25.10] 10/31/2013 Yes     Chronic    Restless leg syndrome [G25.81] 01/05/2013 Yes    HTN (hypertension), benign [I10] 09/07/2012 Yes    Centrilobular emphysema [J43.2] 08/23/2012 Yes     Chronic    GERD (gastroesophageal reflux disease) [K21.9] 08/23/2012 Yes      Problems Resolved During this Admission:      Discharged Condition: fair    Disposition: Home or Self Care    Follow Up:    Patient Instructions:      Notify your health care provider if you experience any of the following:  temperature >100.4     Notify your health care provider if you experience any of the following:  persistent nausea and vomiting or diarrhea     Notify your health care provider if you experience any of the following:  severe uncontrolled pain     Notify your health care provider if you experience any of the following:  redness, tenderness, or signs of infection (pain, swelling, redness, odor or green/yellow discharge around incision site)     Notify your health care provider if you experience any of the following:  difficulty breathing or increased cough     Notify your health care provider if you experience any of the following:  severe persistent headache     Notify your health care provider if you experience any of the following:  worsening rash     Notify your health care provider if you experience any of the following:  persistent dizziness, light-headedness, or visual disturbances     Notify your health care provider if you experience any of the following:  increased confusion or weakness     Medications:  Reconciled Home Medications:      Medication List        START taking these medications      predniSONE 10 MG tablet  Commonly known as: DELTASONE  Take 6 tablets by mouth once daily for 2 days, THEN 5 tablets daily for 4 days, THEN 4 tablets  daily for 4 days, THEN 3 tablets daily for 4 days, THEN 2 tablets daily for 4 days, THEN 1 tablet daily for 4 days.  Start taking on: December 30, 2024            CONTINUE taking these medications      * albuterol 0.63 mg/3 mL Nebu  Commonly known as: ACCUNEB  Inhale 3 mLs (0.63 mg total) by nebulization every 6 (six) hours as needed (if symptoms failed to improve with inhaler). Rescue     * albuterol 90 mcg/actuation inhaler  Commonly known as: PROVENTIL/VENTOLIN HFA  Inhale 2 puffs into the lungs every 4 (four) hours as needed for Wheezing.     amitriptyline 25 MG tablet  Commonly known as: ELAVIL  Take 1 tablet (25 mg total) by mouth once daily.     aspirin 81 MG EC tablet  Commonly known as: ECOTRIN  Take 81 mg by mouth once daily.     atorvastatin 80 MG tablet  Commonly known as: LIPITOR  TAKE 1 TABLET (80 MG TOTAL) BY MOUTH ONCE DAILY.     BREZTRI AEROSPHERE 160-9-4.8 mcg/actuation Hfaa  Generic drug: budesonide-glycopyr-formoterol  Inhale 2 puffs into the lungs 2 (two) times a day.     echinacea 400 mg Cap  Take 1 capsule by mouth once daily.     fluticasone propionate 50 mcg/actuation nasal spray  Commonly known as: FLONASE  Spray 2 sprays (100 mcg total) in Each Nostril once daily.     latanoprost 0.005 % ophthalmic solution  Place 1 drop into both eyes once daily.     losartan 100 MG tablet  Commonly known as: COZAAR  Take 1 tablet (100 mg total) by mouth once daily.     mirtazapine 7.5 MG Tab  Commonly known as: REMERON  Take 1 tablet (7.5 mg total) by mouth every evening.     nitroGLYCERIN 0.4 MG SL tablet  Commonly known as: NITROSTAT  Place 1 tablet (0.4 mg total) under the tongue every 5 (five) minutes as needed.     nystatin 100,000 unit/mL suspension  Commonly known as: MYCOSTATIN  Take 5 mLs (500,000 Units total) by mouth 4 (four) times daily.     OCUVITE ORAL  Take 1 capsule by mouth once daily.     omeprazole 20 MG capsule  Commonly known as: PRILOSEC  Take 1 capsule (20 mg total) by mouth once  daily.     roflumilast 500 mcg Tab  Commonly known as: DALIRESP  Take 1 tablet (500 mcg total) by mouth once daily.     SENNA LAXATIVE 8.6 mg tablet  Generic drug: senna  Take 1 tablet by mouth once daily.     traZODone 50 MG tablet  Commonly known as: DESYREL  Take 1 tablet (50 mg total) by mouth every evening.           * This list has 2 medication(s) that are the same as other medications prescribed for you. Read the directions carefully, and ask your doctor or other care provider to review them with you.                  Katherine Lawrence, DO  Hematology/Oncology  [facility] - Transplant Stepdown

## 2024-12-29 NOTE — HOSPITAL COURSE
Patient admitted for worsening shortness of breath and cough that started shortly after rechallenging Durvalumab, which patient takes as consolidative therapy for stage IV lung cancer. Symptoms similar in presentation to previously treated pneumonitis. No PE on CTA chest. Patient started on steroid taper beginning with oral Prednisone 60 mg with plan to decrease by 10 mg every 4 days. Initially required 2L NC but now breathing comfortably on RA. Mild ZACH noted on labs. Fluid hydration with water encouraged. Patient medically stable for discharge and will follow-up with Dr. Gay on 1/3/2025 as previously scheduled.

## 2024-12-29 NOTE — NURSING
-AAOx4, VSS, afebrile, on room air at this time  -OOB to bathroom, ADLs performed independently  -PharmD delivered meds to bedside  -AVS reviewed with pt  -Pt left unit via wheelchair with daughter and personal belongings wearing surgical mask

## 2024-12-29 NOTE — PLAN OF CARE
Pt AAOx4. Afebrile. VSS. PRN tylenol and ibuprofen given for pain. No report of injuries or a fall this shift. Bed in the lowest setting, call light within reach.

## 2024-12-29 NOTE — DISCHARGE INSTRUCTIONS
You were admitted for shortness of breath with new oxygen requirement.    You were treated with nebulizer and 2L of continuous oxygen. Please continue to use 2L with home oxygen as needed.    You were started on a steroid taper and will be prescribed the full course to continue at home. Please continue to take with the following directions:  - 60 mg daily for the next 2 days (last dose 12/31)  - 50 mg daily for 4 days (last dose 1/4)  - 40 mg daily for 4 days (last dose 1/8)  - 30 mg daily for 4 days (last dose 1/12)  - 20 mg daily for 4 days (last dose 1/16)  - 10 mg daily for 4 days (last dose 1/20)    Please follow-up with Dr. Gay on 1/3/2025 as scheduled.

## 2024-12-31 ENCOUNTER — EXTERNAL CHRONIC CARE MANAGEMENT (OUTPATIENT)
Dept: PRIMARY CARE CLINIC | Facility: CLINIC | Age: 79
End: 2024-12-31
Payer: MEDICARE

## 2024-12-31 PROCEDURE — 99439 CHRNC CARE MGMT STAF EA ADDL: CPT | Mod: PBBFAC,PO | Performed by: INTERNAL MEDICINE

## 2024-12-31 PROCEDURE — 99490 CHRNC CARE MGMT STAFF 1ST 20: CPT | Mod: PBBFAC,PO | Performed by: INTERNAL MEDICINE

## 2025-01-01 ENCOUNTER — PATIENT MESSAGE (OUTPATIENT)
Dept: ADMINISTRATIVE | Facility: OTHER | Age: 80
End: 2025-01-01
Payer: MEDICARE

## 2025-01-02 ENCOUNTER — PATIENT MESSAGE (OUTPATIENT)
Dept: ADMINISTRATIVE | Facility: OTHER | Age: 80
End: 2025-01-02
Payer: MEDICARE

## 2025-01-02 ENCOUNTER — PATIENT OUTREACH (OUTPATIENT)
Dept: ADMINISTRATIVE | Facility: CLINIC | Age: 80
End: 2025-01-02
Payer: MEDICARE

## 2025-01-02 NOTE — PROGRESS NOTES
C3 nurse attempted to contact Jordin Allen Jr.  for a TCC post hospital discharge follow up call. No answer. Left voicemail with callback information. The patient does not have a scheduled HOSFU appointment. Message sent to PCP staff for assistance with scheduling visit with patient.

## 2025-01-02 NOTE — TELEPHONE ENCOUNTER
I tried calling pt to assist with hosp f/u appt. No answer. Hoem number have been d/c or changed. Left VM on cell. I also tried calling pt's daughter Marzena. No answer . Left VM

## 2025-01-02 NOTE — PROGRESS NOTES
C3 nurse spoke with Jordin Allen Jr. for a TCC post hospital discharge follow up call. Patient has an appt with Isacc Nuñez IV, MD (pt's hemoncologist) tomorrow, 01/03/25 @ 1300. Pt states he will see this provider as a hospital follow up.

## 2025-01-03 ENCOUNTER — OFFICE VISIT (OUTPATIENT)
Dept: HEMATOLOGY/ONCOLOGY | Facility: CLINIC | Age: 80
End: 2025-01-03
Payer: MEDICARE

## 2025-01-03 ENCOUNTER — LAB VISIT (OUTPATIENT)
Dept: LAB | Facility: HOSPITAL | Age: 80
End: 2025-01-03
Attending: HOSPITALIST
Payer: MEDICARE

## 2025-01-03 ENCOUNTER — PATIENT MESSAGE (OUTPATIENT)
Dept: ADMINISTRATIVE | Facility: OTHER | Age: 80
End: 2025-01-03
Payer: MEDICARE

## 2025-01-03 VITALS
HEIGHT: 69 IN | HEART RATE: 94 BPM | WEIGHT: 197.06 LBS | DIASTOLIC BLOOD PRESSURE: 92 MMHG | OXYGEN SATURATION: 99 % | RESPIRATION RATE: 18 BRPM | SYSTOLIC BLOOD PRESSURE: 161 MMHG | BODY MASS INDEX: 29.19 KG/M2

## 2025-01-03 DIAGNOSIS — C34.12 ADENOCARCINOMA OF UPPER LOBE OF LEFT LUNG: ICD-10-CM

## 2025-01-03 DIAGNOSIS — C77.1 SECONDARY MALIGNANT NEOPLASM OF INTRATHORACIC LYMPH NODES: ICD-10-CM

## 2025-01-03 DIAGNOSIS — C79.9 METASTATIC NEOPLASM: ICD-10-CM

## 2025-01-03 DIAGNOSIS — C34.92 ADENOCARCINOMA, LUNG, LEFT: Primary | ICD-10-CM

## 2025-01-03 DIAGNOSIS — J70.0 RADIATION PNEUMONITIS: ICD-10-CM

## 2025-01-03 DIAGNOSIS — C79.31 SECONDARY MALIGNANT NEOPLASM OF BRAIN: ICD-10-CM

## 2025-01-03 LAB
ALBUMIN SERPL BCP-MCNC: 3.4 G/DL (ref 3.5–5.2)
ALP SERPL-CCNC: 95 U/L (ref 40–150)
ALT SERPL W/O P-5'-P-CCNC: 31 U/L (ref 10–44)
ANION GAP SERPL CALC-SCNC: 10 MMOL/L (ref 8–16)
AST SERPL-CCNC: 21 U/L (ref 10–40)
BILIRUB SERPL-MCNC: 0.7 MG/DL (ref 0.1–1)
BUN SERPL-MCNC: 32 MG/DL (ref 8–23)
CALCIUM SERPL-MCNC: 9.4 MG/DL (ref 8.7–10.5)
CHLORIDE SERPL-SCNC: 107 MMOL/L (ref 95–110)
CO2 SERPL-SCNC: 25 MMOL/L (ref 23–29)
CREAT SERPL-MCNC: 1.1 MG/DL (ref 0.5–1.4)
ERYTHROCYTE [DISTWIDTH] IN BLOOD BY AUTOMATED COUNT: 16.2 % (ref 11.5–14.5)
EST. GFR  (NO RACE VARIABLE): >60 ML/MIN/1.73 M^2
GLUCOSE SERPL-MCNC: 114 MG/DL (ref 70–110)
HCT VFR BLD AUTO: 40.2 % (ref 40–54)
HGB BLD-MCNC: 12.3 G/DL (ref 14–18)
IMM GRANULOCYTES # BLD AUTO: 0.2 K/UL (ref 0–0.04)
MCH RBC QN AUTO: 26.9 PG (ref 27–31)
MCHC RBC AUTO-ENTMCNC: 30.6 G/DL (ref 32–36)
MCV RBC AUTO: 88 FL (ref 82–98)
NEUTROPHILS # BLD AUTO: 11.2 K/UL (ref 1.8–7.7)
PLATELET # BLD AUTO: 176 K/UL (ref 150–450)
PMV BLD AUTO: 10.5 FL (ref 9.2–12.9)
POTASSIUM SERPL-SCNC: 3.6 MMOL/L (ref 3.5–5.1)
PROT SERPL-MCNC: 6.6 G/DL (ref 6–8.4)
RBC # BLD AUTO: 4.58 M/UL (ref 4.6–6.2)
SODIUM SERPL-SCNC: 142 MMOL/L (ref 136–145)
T4 FREE SERPL-MCNC: 1.06 NG/DL (ref 0.71–1.51)
TSH SERPL DL<=0.005 MIU/L-ACNC: 1.03 UIU/ML (ref 0.4–4)
WBC # BLD AUTO: 12.41 K/UL (ref 3.9–12.7)

## 2025-01-03 PROCEDURE — 80053 COMPREHEN METABOLIC PANEL: CPT | Performed by: HOSPITALIST

## 2025-01-03 PROCEDURE — 99214 OFFICE O/P EST MOD 30 MIN: CPT | Mod: PBBFAC | Performed by: HOSPITALIST

## 2025-01-03 PROCEDURE — 36415 COLL VENOUS BLD VENIPUNCTURE: CPT | Performed by: HOSPITALIST

## 2025-01-03 PROCEDURE — 84443 ASSAY THYROID STIM HORMONE: CPT | Performed by: HOSPITALIST

## 2025-01-03 PROCEDURE — 85027 COMPLETE CBC AUTOMATED: CPT | Performed by: HOSPITALIST

## 2025-01-03 PROCEDURE — G2211 COMPLEX E/M VISIT ADD ON: HCPCS | Mod: S$PBB,,, | Performed by: HOSPITALIST

## 2025-01-03 PROCEDURE — 84439 ASSAY OF FREE THYROXINE: CPT | Performed by: HOSPITALIST

## 2025-01-03 PROCEDURE — 99215 OFFICE O/P EST HI 40 MIN: CPT | Mod: S$PBB,,, | Performed by: HOSPITALIST

## 2025-01-03 PROCEDURE — 99999 PR PBB SHADOW E&M-EST. PATIENT-LVL IV: CPT | Mod: PBBFAC,,, | Performed by: HOSPITALIST

## 2025-01-04 ENCOUNTER — PATIENT MESSAGE (OUTPATIENT)
Dept: CARDIOLOGY | Facility: CLINIC | Age: 80
End: 2025-01-04
Payer: MEDICARE

## 2025-01-04 ENCOUNTER — PATIENT MESSAGE (OUTPATIENT)
Dept: ADMINISTRATIVE | Facility: OTHER | Age: 80
End: 2025-01-04
Payer: MEDICARE

## 2025-01-05 ENCOUNTER — PATIENT MESSAGE (OUTPATIENT)
Dept: ADMINISTRATIVE | Facility: OTHER | Age: 80
End: 2025-01-05
Payer: MEDICARE

## 2025-01-05 NOTE — PROGRESS NOTES
[facility] MEDICAL ONCOLOGY - FOLLOW UP VISIT    Reason for visit: Adenocarcinoma of the SANJAY      Oncology History   Adenocarcinoma, lung, left   3/19/2024 Imaging Significant Findings    CTA PE (hemoptysis)  - 3.4 x 5.8 cm L hilar mass, probable invasion of distal L main bronchus and SANJAY bronchi     3/19/2024 - 3/22/2024 Hospital Admission    Presented with new onset hemoptysis.  Imaging demonstrated left hilar mass.  Bronchoscopy performed and confirmed adenocarcinoma, EGFR Exon 20 insertion.     3/21/2024 Procedure    Bronch w/ EBUS  SANJAY, Endobronchial Bx  - Adenocarcinoma (CK7 and TTF1 positive; CK20, p40, and CK 5/6 negative)    SANJAY, BAL  - Adenocarcinoma    TEMPUS NGS/PD-L1  - PD-L1 TPS: 35%  - EGFR exon 20 inframe insertion ([id number])  - TP53 misssense ([id number])  - Negative: EGFR, ALK, BRAF, KRAS, ROS1, RET, MET, ERBB2  - TMB 9.5 m/MB     4/3/2024 Imaging Significant Findings    MRI Brain  - 0.8 cm rim enhancing lesion in L thalamus, no significant mass effect      4/11/2024 Imaging Significant Findings    PET CT  - 4.8 x 2.3 cm  L hilar mass, SUV 14  - Adjacent pulmonary nodules, e.g. 1.2 cm  - No intrathoracic LAD     4/12/2024 Cancer Staged    Staging form: Lung, AJCC 8th Edition  - Clinical stage from 4/12/2024: Stage STEF (cT3, cN1, cM1b)     4/30/2024 Procedure    Bronch with EBUS  RUL, EBBx  - Adenocarcinoma (TTF-1 positive, p40 negative)    LN  - Adenocarcinoma    Procedure Note  - Station for L was normal-sized not sampled; no other lymph nodes were visible by EBUS, including station 7, 4R, and 11R     5/8/2024 - 6/18/2024 Radiation Therapy    Treatment Summary  Course: C1 Lung 2024  Treatment Site Energy Dose/Fx (Gy) #Fx Total Dose (Gy) Start Date End Date Elapsed Days   Left hilum/lung 6X 2 30 / 30 60 5/8/2024 6/18/2024 41        5/9/2024 - 6/18/2024 Chemotherapy    Carboplatin/Paclitaxel, Concurrent w/ Radiation Therapy  Weekly  - Carboplatin AUC 2  -  Paclitaxel 45 mg/m2     6/27/2024 Imaging Significant Findings    CT C/A/P  - 3.2 x 3.1 cm left hilar mass, previously 3.7 cm  - Previously seen 1.2 cm nodule adjacent to primary mass not seen on this exam  - Near complete resolution of multiple prior nodular opacities  - Stable RUL micronodule     7/18/2024 -  Chemotherapy    Treatment Summary   Plan Name: OP DURVALUMAB 1500 MG Q4W  Treatment Goal: Curative  Status: Active  Start Date: 7/18/2024  End Date: 9/12/2025 (Planned)  Provider: Isacc Nuñez IV, MD  Chemotherapy: [No matching medication found in this treatment plan]     7/26/2024 Imaging Significant Findings    MRI Brain  - 2.2 cm peripherally enhancing cystic lesion of left thalamus, previously 0.8 cm.  Smaller surrounding edema signal, localized mass effect with slight effacement of the adjacent lateral ventricle, no hydrocephalus or midline shift.     8/2/2024 - 8/7/2024 Radiation Therapy    Treating physician: Bienvenido Henson  GK SRT  Target # Site Dose per Fraction (Gy) Cumulative Dose % Isodose Line Fraction # Total Fractions   1 Lt Thalamic 9 27 51 3 3          9/18/2024 Imaging Significant Findings    PET CT  4.9 x 2.4 cm left hilar mass (previously 4.8 x 2.3 cm), SUV 7.3 (previously 14)  0.5 cm satellite pulmonary SANJAY nodule, previously 1.2 cm  1 0.0 x 0.9 cm new satellite hypermetabolic pulmonary nodule in the anterior segment of SANJAY, SUV 9.4, new, radiation change vs progression  Development of patchy solid and ground-glass opacities with interlobular wall thickening in posterior segment of SANJAY, radiation change vs progression     10/7/2024 Imaging Significant Findings    MRI Brain  1.9 x 1.7 cm right cerebellar hemisphere irregular enhancing lesion, new from prior.  Moderate surrounding vasogenic edema spanning 5 cm  1.5 x 1.0 cm dorsal left thalamic lesion, decreased from 2.2 x 1.7 cm     10/11/2024 - 10/11/2024 Radiation Therapy    Treating physician: Bienvenido Henson  Gamma Knife  SRS  Target # Site Dose per Fraction (Gy) Cumulative Dose % Isodose Line Total Fractions   1 C2 Rt Temporal 20 20 56 1        10/15/2024 Imaging Significant Findings    CT C, Non-Con  Interval worsening of left-sided pleural pulmonary process likely representing radiation pneumonitis   Interval development of soft tissue density nodules in both lungs of unclear etiology (new 0.6 cm lesion in RUL; new 1.2 cm lesion in LLL)     12/3/2024 Imaging Significant Findings    CT C  2.2 x 1.1 spiculated nodule in central LLL, new compared to 10/15/24, given location may represent radiation change of recurrent malignancy is not excluded  1.3 x 0.5 cm SANJAY nodule with concave margins unchanged  Interval resolution of irregular nodules in right upper and left lower lobes compared to 10/15/24        Oncology History    HPI:     Jordin Allen  is a 78 y.o. 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  CT C after completion of steroids and continue resuming durvalumab at that time.    Interval History:  Completed steroids  11/19/24: Cardiac PET can, negative for ischemia, normal wall motion at rest and during stress  12/3/24:  CT C, new LLL spiculated nodule of  "unclear etiology, apparently stable left hilar malignancy, resolution of previously described nodules in right upper and left lower lobes  12/27/24: Admitted for one day for shortness of breath and discharged on PO steroid taper. CT chest without any significant abnormalities but presentation thought to be Grade II pneumonitis    Today, he refers feeling improved with his breathing since being discharged. Notes occasional cough but refers this is not bothering him much. He is at his baseline O2 2L NC. Biggest complaint is a headache and right sided rib pain, though recent CT chest does not show any fracture. In good spirits.      ROS:   As per HPI.     Physical Exam:       BP (!) 161/92 (BP Location: Left arm, Patient Position: Sitting)   Pulse 94   Resp 18   Ht 5' 9" (1.753 m)   Wt 89.4 kg (197 lb 1.5 oz)   SpO2 99%   BMI 29.11 kg/m²                Physical Exam  Constitutional:       Appearance: Normal appearance.   HENT:      Head: Normocephalic and atraumatic.   Eyes:      Extraocular Movements: Extraocular movements intact.      Conjunctiva/sclera: Conjunctivae normal.      Pupils: Pupils are equal, round, and reactive to light.   Cardiovascular:      Rate and Rhythm: Normal rate and regular rhythm.      Heart sounds: No murmur heard.     No friction rub. No gallop.   Pulmonary:      Effort: Pulmonary effort is normal.      Breath sounds: No wheezing, rhonchi or rales.   Musculoskeletal:         General: Normal range of motion.      Right lower leg: No edema.      Left lower leg: No edema.   Skin:     General: Skin is warm and dry.   Neurological:      Mental Status: He is alert and oriented to person, place, and time.   Psychiatric:         Mood and Affect: Mood normal.         Thought Content: Thought content normal.         Judgment: Judgment normal.           Labs:   Recent Results (from the past 48 hours)   Comprehensive Metabolic Panel    Collection Time: 01/03/25 11:31 AM   Result Value Ref Range "    Sodium 142 136 - 145 mmol/L    Potassium 3.6 3.5 - 5.1 mmol/L    Chloride 107 95 - 110 mmol/L    CO2 25 23 - 29 mmol/L    Glucose 114 (H) 70 - 110 mg/dL    BUN 32 (H) 8 - 23 mg/dL    Creatinine 1.1 0.5 - 1.4 mg/dL    Calcium 9.4 8.7 - 10.5 mg/dL    Total Protein 6.6 6.0 - 8.4 g/dL    Albumin 3.4 (L) 3.5 - 5.2 g/dL    Total Bilirubin 0.7 0.1 - 1.0 mg/dL    Alkaline Phosphatase 95 40 - 150 U/L    AST 21 10 - 40 U/L    ALT 31 10 - 44 U/L    eGFR >60.0 >60 mL/min/1.73 m^2    Anion Gap 10 8 - 16 mmol/L   CBC Oncology    Collection Time: 01/03/25 11:31 AM   Result Value Ref Range    WBC 12.41 3.90 - 12.70 K/uL    RBC 4.58 (L) 4.60 - 6.20 M/uL    Hemoglobin 12.3 (L) 14.0 - 18.0 g/dL    Hematocrit 40.2 40.0 - 54.0 %    MCV 88 82 - 98 fL    MCH 26.9 (L) 27.0 - 31.0 pg    MCHC 30.6 (L) 32.0 - 36.0 g/dL    RDW 16.2 (H) 11.5 - 14.5 %    Platelets 176 150 - 450 K/uL    MPV 10.5 9.2 - 12.9 fL    Gran # (ANC) 11.2 (H) 1.8 - 7.7 K/uL    Immature Grans (Abs) 0.20 (H) 0.00 - 0.04 K/uL   TSH    Collection Time: 01/03/25 11:31 AM   Result Value Ref Range    TSH 1.027 0.400 - 4.000 uIU/mL   T4, Free    Collection Time: 01/03/25 11:31 AM   Result Value Ref Range    Free T4 1.06 0.71 - 1.51 ng/dL             Imaging:    See oncologic history above.     Path:  See oncologic history above.      Assessment and Plan:     Jordin Allen Jr. is a 78 y.o. 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  Patient received 2 cycles of consolidative durvalumab, subsequently held due to worsening shortness of breath.  A CT chest (10/15/24) demonstrated worsening of left-sided pulmonary process likely representing radiation pneumonitis as well as interval development of soft tissue density nodules in both lungs.  Durvalumab was held and patient was started on a course of steroids which he has since completed.  His respiratory status has improved significantly (no longer requiring O2, exercise tolerance increased), as have other symptoms including his appetite and energy level.  Repeat imaging (CT C, 12/3/24) demonstrates interval resolution of irregular nodules in the right upper and left lower lobes, apparent stability in the left hilar malignancy (noting this disease is difficult to visualize), and interval development of a 2.2 x 1.1 cm spiculated nodule in the central LLL.  Regarding the first 2 findings, as well as improvement in clinical status, favor that this represents improvement of likely radiation pneumonitis.  Regarding the last finding, unclear if this represents possible radiation change given location or recurrent malignancy (somewhat concerning given spiculated appearance).  Discussed at length with the patient, favor short interval imaging in 8 weeks for further evaluation (hold on biopsy this time, and PET-CT of likely you low utility given pneumonitis and recent radiation to this area).  In the interim, discussed  that given resolution of pneumonitis to < G1, it is appropriate to rechallenge with durvalumab at this time (noting this guidance is generally for immunotherapy induced pneumonitis rather than radiation induced  pneumonitis).  Discussed potential risk for pneumonitis recurrence/worsening at length with the patient who endorses understanding and desire to proceed. Admitted on 12/27 for shorntess of breath and presentation thought to be consistent with grade II pneumonitis. Discharged on PO steroid taper and symptoms improved      PLAN:   Discussed that given his second episode of pneumonitis we will discontinue his durvalumab indefinitely  Continue PO steroid taper as prescribed. Currently on Pred 40mg daily  CT C around 3/25 for short interval follow up       Brain Met  L Sided Headache  0.8 cm rim enhancing lesion in L thalamus on initial imaging. On initial review with radiology, low likelihood of malignancy and instead more likely cystic lesion, so pt treated w/ curative intent therapy for intrathoracic disease with plan for surveillance. Unfortunately, repeat MRI brain on 7/26/24 demonstrated growth to 2.2 cm. Pt now s/p GK to this solitary lesion, continuing to treat as oligometastatic disease as above. Repeat MRI brain (10/7/24) demonstrated a new right cerebellar hemisphere hilar irregular enhancing lesion, for which patient underwent SRS on 10/11/24.  Today (12/6/24), he describes an ongoing left-sided headache over the past 4 days (see HPI for description), will obtain MRI imaging now to rule out new/worsening CNS disease.  MRI brain w/ and w/o ordered ASAP, message sent to schedule. Radiation oncology aware.  Previously scheduled MRI brain on 1/24/25  Rad onc f/u on 1/24/25       Decreased Appetite  Nausea  Pt previously endorsed a significantly decreased appetite. He had lost about 20 lbs down from his pre-treatment weight. Likely multifactorial, including recent CRT, GK to CNS met, and diarrheal illness. Currently on mirtazapine to minimal effect. Declines f/u with onc nutrition, stating that he has heard the recommendations before. Attempted dronabinol, however unable to procure this due to shortage and so  instead started on Periactin. Nausea of unclear etiology, perhaps related to CNS disease though less likely s/p treatment. Significantly improved s/p course of steroids for pneumonitis, since off appetite stimulants.   Palliative care f/u       Fatigue  Likely multifactorial, including completion of CRT, GK to CNS met, diarrheal illness, and decreased PO intake. Significantly improved after course of steroids for pneumonitis.   Continue to follow with palliative care      The above information has been reviewed with the patient and all questions have been answered to their apparent satisfaction.  They understand that they can call the clinic with any questions.    Discussed with Dr. Nuñez.    Torsten Rangel MD   Hematology and Oncology Fellow, PGY V       Route Chart for Scheduling  Med Onc Route Chart for Scheduling

## 2025-01-06 ENCOUNTER — TELEPHONE (OUTPATIENT)
Dept: CARDIOLOGY | Facility: CLINIC | Age: 80
End: 2025-01-06
Payer: MEDICARE

## 2025-01-06 ENCOUNTER — PATIENT MESSAGE (OUTPATIENT)
Dept: ADMINISTRATIVE | Facility: OTHER | Age: 80
End: 2025-01-06
Payer: MEDICARE

## 2025-01-06 ENCOUNTER — OFFICE VISIT (OUTPATIENT)
Dept: OTOLARYNGOLOGY | Facility: CLINIC | Age: 80
End: 2025-01-06
Payer: MEDICARE

## 2025-01-06 ENCOUNTER — TELEPHONE (OUTPATIENT)
Dept: AUDIOLOGY | Facility: CLINIC | Age: 80
End: 2025-01-06
Payer: MEDICARE

## 2025-01-06 ENCOUNTER — OFFICE VISIT (OUTPATIENT)
Dept: PODIATRY | Facility: CLINIC | Age: 80
End: 2025-01-06
Payer: MEDICARE

## 2025-01-06 VITALS
DIASTOLIC BLOOD PRESSURE: 94 MMHG | SYSTOLIC BLOOD PRESSURE: 163 MMHG | BODY MASS INDEX: 29.11 KG/M2 | HEART RATE: 86 BPM | HEIGHT: 69 IN

## 2025-01-06 DIAGNOSIS — B35.1 TINEA UNGUIUM: ICD-10-CM

## 2025-01-06 DIAGNOSIS — M79.671 PAIN IN BOTH FEET: ICD-10-CM

## 2025-01-06 DIAGNOSIS — M79.672 PAIN IN BOTH FEET: ICD-10-CM

## 2025-01-06 DIAGNOSIS — R22.43 LOCALIZED SWELLING OF BOTH LOWER LEGS: Primary | ICD-10-CM

## 2025-01-06 DIAGNOSIS — H90.3 SENSORINEURAL HEARING LOSS, BILATERAL: Primary | ICD-10-CM

## 2025-01-06 DIAGNOSIS — H91.10 PRESBYCUSIS, UNSPECIFIED LATERALITY: ICD-10-CM

## 2025-01-06 DIAGNOSIS — H61.22 IMPACTED CERUMEN OF LEFT EAR: Primary | ICD-10-CM

## 2025-01-06 PROBLEM — E66.01 SEVERE OBESITY (BMI 35.0-39.9) WITH COMORBIDITY: Status: RESOLVED | Noted: 2024-04-03 | Resolved: 2025-01-06

## 2025-01-06 PROCEDURE — 99213 OFFICE O/P EST LOW 20 MIN: CPT | Mod: PBBFAC,27 | Performed by: STUDENT IN AN ORGANIZED HEALTH CARE EDUCATION/TRAINING PROGRAM

## 2025-01-06 PROCEDURE — 99213 OFFICE O/P EST LOW 20 MIN: CPT | Mod: PBBFAC,25 | Performed by: OTOLARYNGOLOGY

## 2025-01-06 PROCEDURE — 11720 DEBRIDE NAIL 1-5: CPT | Mod: PBBFAC | Performed by: STUDENT IN AN ORGANIZED HEALTH CARE EDUCATION/TRAINING PROGRAM

## 2025-01-06 PROCEDURE — 99999 PR PBB SHADOW E&M-EST. PATIENT-LVL III: CPT | Mod: PBBFAC,,, | Performed by: STUDENT IN AN ORGANIZED HEALTH CARE EDUCATION/TRAINING PROGRAM

## 2025-01-06 PROCEDURE — 69210 REMOVE IMPACTED EAR WAX UNI: CPT | Mod: S$PBB,,, | Performed by: OTOLARYNGOLOGY

## 2025-01-06 PROCEDURE — 99213 OFFICE O/P EST LOW 20 MIN: CPT | Mod: 25,S$PBB,, | Performed by: OTOLARYNGOLOGY

## 2025-01-06 PROCEDURE — 99214 OFFICE O/P EST MOD 30 MIN: CPT | Mod: 25,S$PBB,, | Performed by: STUDENT IN AN ORGANIZED HEALTH CARE EDUCATION/TRAINING PROGRAM

## 2025-01-06 PROCEDURE — 99999 PR PBB SHADOW E&M-EST. PATIENT-LVL III: CPT | Mod: PBBFAC,,, | Performed by: OTOLARYNGOLOGY

## 2025-01-06 PROCEDURE — 69210 REMOVE IMPACTED EAR WAX UNI: CPT | Mod: PBBFAC | Performed by: OTOLARYNGOLOGY

## 2025-01-06 RX ORDER — FUROSEMIDE 20 MG/1
20 TABLET ORAL DAILY
Qty: 30 TABLET | Refills: 1 | Status: SHIPPED | OUTPATIENT
Start: 2025-01-06 | End: 2025-03-07

## 2025-01-06 NOTE — TELEPHONE ENCOUNTER
----- Message from Jessica sent at 1/6/2025 11:58 AM CST -----  Patient is calling in regards to his BP. Also stated he would like to schedule an appointment. He is not due to 6/25. He can be reached at [phone].      Thank you

## 2025-01-06 NOTE — PROGRESS NOTES
General Cardiology Clinic Note  Last Clinic Visit: 12/10/24 with Dr. Ba   General Cardiologist: Dr. Ba    HPI:     Jordin Allen Jr. is a 79 y.o. , who presents for HTN.    PROBLEM LIST:  CAD  - s/p MARI to LAD (2013)  HTN  HLD  Aortic atherosclerosis  SHOLA  Lung adenocarcinoma, s/o chemo and XRT now on immunotherapy    Interval HPI:   He presents today with concerns of elevated blood pressure readings. He had a hospital admission at the end of December for a pneumonitis flare which was unfortunately related to his immunotherapy. He was started on steroid taper beginning with PO prednisone 60mg daily, decreasing by 10 mg every 4 days until complete. Following this, he has noticed BP readings of 140-160s/90s. Today in clinic he was 168/91. Prior to his December admission/steroid course, his BP was low-normal.  He denies any symptoms associated with high BP -- no CP, HA, vision changes, SOB, etc. He is also having some ankle swelling since beginning prednisone, for which his podiatrist prescribed lasix 20mg daily. He has about 2 weeks left of the steroid taper, currently on 40mg daily. He otherwise is doing well from a CV standpoint and denies chest pain, new/worsening MONTIEL, PND/orthopnea, sustained palpitations, lightheadedness or syncope, or changes in exertional capacity.     12/2024 HPI (Dr. Ba)  Routine 6 month f/u.  He has mets in his brain but they've shrunk with treatment per recent MRI.  He did chemo and radiation and now is on consolidation immunotherapy with durvalumab.  He states that he can walk at a regular pace without getting winded for some time, but if he pushes himself he gets a bit out of breath.  No angina, syncope, palpitations, or PND/orthopnea.  No hemoptysis.     6/2024 HPI (Dr. Ba)  Back for < 3 month f/u.  A couple of days after last seeing me, he developed hemoptysis and sought care in the ED.  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   He's now on carboplatin and paclitaxel, and is getting radiation therapy.  He's feeling awfully well for someone with the above.  No chest discomfort.  No significant dyspnea with exertion, though he hasn't been playing golf as he used to.  He's going to wait until the weather cools down.  No more hemoptysis.     3/2024 HPI (Dr. Ba)  Yearly f/u.  He's struggling because he has been having more dyspnea playing golf, especially walking to the green and back when he does the most walking.  No angina with this but he also did not have chest discomfort prior to 2013 stenting (MONTIEL mostly).  No palpitations.  No syncope.  No orthopnea/PND.    2/2022 HPI (Dr. Ba)   Yearly routine f/u.  He is doing well with no new symptoms or cardiovascular complaints and no change in exercise capacity.  He denies chest discomfort, MONTIEL, palpitations, PND/orthopnea, lightheadedness and syncope.  No bleeding, hematochezia, or melena.  No TIA/stroke symtoms.  Fully vaccinated and boosted (8/26/21) against COVID.     12/2018 HPI (Dr. Ba)   Here a little early for yearly routine f/u.  Still playing golf 4-5 times per week at [facility] without any problems.  He is doing well with no new symptoms or cardiovascular complaints and no change in exercise capacity.  He denies chest discomfort, MONTIEL, palpitations, PND/orthopnea, lightheadedness and syncope.  He continues to take prednisone 5mg daily and is now seeing Dr. Pemberton as Dr. Estrada retired.  He decided against doing bariatric surgery.  His BMI today is 34.  He's down 5# since February.     6/2016 HPI (Dr. Ba)   Mr. Allen is a very pleasant man who has been seen by Dr. Pinedo at [facility] for an MI on [facility] three years ago. He had an occluded proximal LAD that was treated successfully and he retained normal systolic function.  Prior to his initial visit with me 15 months ago, he had had a rash and was inadvertently told by a nurse to hold his ASA and Lipitor. He's back on that now and  doing fine. He has had a problem with pruritic rashes in the past but that is not a problem currently and hasn't had a flare in about 3 months. These patches are now mainly on his anterior forearms.  He's going to phase III rehab now and doing well. He denies chest discomfort, MONTIEL, palpitations, PND/orthopnea, lightheadedness and syncope.  He takes prednisone 10mg every other day as directed by a Pulmonologist.      Surgical: Reviewed, as below.  Family: Reviewed, as below.   Social: Reviewed, as below.    ROS:    Pertinent ROS included in HPI and below.  PMH:     Past Medical History:   Diagnosis Date    Benign neoplasm of colon 10/01/2013    Bronchitis chronic     CAD (coronary artery disease) 10/31/2013    COPD (chronic obstructive pulmonary disease)     Emphysema of lung     GERD (gastroesophageal reflux disease)     Hx of colonic polyps     Hypertension     NAFLD (nonalcoholic fatty liver disease) 03/27/2017    SHOLA on CPAP     RLS (restless legs syndrome)     Screen for colon cancer 08/30/2013     Past Surgical History:   Procedure Laterality Date    bilateral foot surgery  1993    CARDIAC CATHETERIZATION  2013    x1    CATARACT EXTRACTION, BILATERAL      COLON SURGERY  2013    Ace Mott MD    COLONOSCOPY N/A 3/21/2018    Procedure: COLONOSCOPY;  Surgeon: Terry Mott MD;  Location: [address]);  Service: Endoscopy;  Laterality: N/A;    COLONOSCOPY N/A 4/12/2019    Procedure: COLONOSCOPY;  Surgeon: John Mantilla MD;  Location: [address]);  Service: Endoscopy;  Laterality: N/A;    COLONOSCOPY N/A 5/31/2022    Procedure: COLONOSCOPY;  Surgeon: MEDINA Farris MD;  Location: [address]);  Service: Endoscopy;  Laterality: N/A;  fully vacc-inst portal-tb    ENDOBRONCHIAL ULTRASOUND Bilateral 4/30/2024    Procedure: ENDOBRONCHIAL ULTRASOUND (EBUS);  Surgeon: Naveed Aguero MD;  Location: [facility] OR;  Service: Pulmonary;  Laterality: Bilateral;    scrotal abscess removal        Allergies:     Review of patient's allergies indicates:   Allergen Reactions    Brilinta [ticagrelor] Itching    Lisinopril      Other reaction(s): cough    Metoprolol succinate Rash     Medications:     Current Outpatient Medications on File Prior to Visit   Medication Sig Dispense Refill    albuterol (ACCUNEB) 0.63 mg/3 mL Nebu Inhale 3 mLs (0.63 mg total) by nebulization every 6 (six) hours as needed (if symptoms failed to improve with inhaler). Rescue 375 mL 11    albuterol (PROVENTIL/VENTOLIN HFA) 90 mcg/actuation inhaler Inhale 2 puffs into the lungs every 4 (four) hours as needed for Wheezing. 8.5 g 11    amitriptyline (ELAVIL) 25 MG tablet Take 1 tablet (25 mg total) by mouth once daily. 90 tablet 3    aspirin (ECOTRIN) 81 MG EC tablet Take 81 mg by mouth once daily.       atorvastatin (LIPITOR) 80 MG tablet TAKE 1 TABLET (80 MG TOTAL) BY MOUTH ONCE DAILY. 90 tablet 3    BETA-CAROTENE,A, W-C & E/MIN (OCUVITE ORAL) Take 1 capsule by mouth once daily.       budesonide-glycopyr-formoterol (BREZTRI AEROSPHERE) 160-9-4.8 mcg/actuation HFAA Inhale 2 puffs into the lungs 2 (two) times a day. 10.7 g 3    echinacea 400 mg Cap Take 1 capsule by mouth once daily.       fluticasone propionate (FLONASE) 50 mcg/actuation nasal spray Spray 2 sprays (100 mcg total) in Each Nostril once daily. 16 g 11    furosemide (LASIX) 20 MG tablet Take 1 tablet (20 mg total) by mouth once daily. 30 tablet 1    latanoprost 0.005 % ophthalmic solution Place 1 drop into both eyes once daily.       losartan (COZAAR) 100 MG tablet Take 1 tablet (100 mg total) by mouth once daily. 90 tablet 3    mirtazapine (REMERON) 7.5 MG Tab Take 1 tablet (7.5 mg total) by mouth every evening. 30 tablet 0    nitroGLYCERIN (NITROSTAT) 0.4 MG SL tablet Place 1 tablet (0.4 mg total) under the tongue every 5 (five) minutes as needed. 100 tablet 3    nystatin (MYCOSTATIN) 100,000 unit/mL suspension Take 5 mLs (500,000 Units total) by mouth 4 (four) times  daily. 473 mL 0    omeprazole (PRILOSEC) 20 MG capsule Take 1 capsule (20 mg total) by mouth once daily. 90 capsule 3    predniSONE (DELTASONE) 10 MG tablet Take 6 tablets by mouth once daily for 2 days, THEN 5 tablets daily for 4 days, THEN 4 tablets daily for 4 days, THEN 3 tablets daily for 4 days, THEN 2 tablets daily for 4 days, THEN 1 tablet daily for 4 days. 72 tablet 0    roflumilast (DALIRESP) 500 mcg Tab Take 1 tablet (500 mcg total) by mouth once daily. 90 tablet 3    senna (SENOKOT) 8.6 mg tablet Take 1 tablet by mouth once daily. 60 tablet 2    traZODone (DESYREL) 50 MG tablet Take 1 tablet (50 mg total) by mouth every evening. 30 tablet 2     Current Facility-Administered Medications on File Prior to Visit   Medication Dose Route Frequency Provider Last Rate Last Admin    dextrose 50% injection 12.5 g  12.5 g Intravenous PRN Lidia Deleon MD        dextrose 50% injection 25 g  25 g Intravenous PRN Lidia Deleon MD        insulin regular injection 0-8 Units  0-8 Units Intravenous Continuous PRN Lidia Deleon MD         Social History:     Social History     Tobacco Use    Smoking status: Former     Current packs/day: 0.00     Average packs/day: 1 pack/day for 40.0 years (40.0 ttl pk-yrs)     Types: Cigarettes     Start date: 8/15/1972     Quit date: 8/15/2012     Years since quittin.4     Passive exposure: Never    Smokeless tobacco: Never   Substance Use Topics    Alcohol use: Yes     Alcohol/week: 3.0 standard drinks of alcohol     Types: 3 Cans of beer per week     Comment: maybe 2-3  beers weekly     Family History:     Family History   Problem Relation Name Age of Onset    Heart disease Mother      Heart attack Mother      Hypertension Mother      No Known Problems Sister      No Known Problems Daughter 2     Asthma Neg Hx      Emphysema Neg Hx      Prostate cancer Neg Hx      Cirrhosis Neg Hx       Physical Exam:   BP (!) 168/91 (BP Location: Left arm, Patient Position:  "Sitting)   Pulse 80   Ht 5' 9" (1.753 m)   Wt 89.4 kg (197 lb 1.5 oz)   SpO2 99%   BMI 29.11 kg/m²      Physical Exam  Constitutional:       Appearance: Normal appearance.   HENT:      Head: Normocephalic.      Mouth/Throat:      Mouth: Mucous membranes are moist.   Neck:      Vascular: No carotid bruit.   Cardiovascular:      Rate and Rhythm: Normal rate and regular rhythm. Extrasystoles are present.     Pulses:           Carotid pulses are 2+ on the right side and 2+ on the left side.       Radial pulses are 2+ on the right side and 2+ on the left side.      Heart sounds: Normal heart sounds. No murmur heard.  Pulmonary:      Effort: Pulmonary effort is normal.      Breath sounds: Normal breath sounds.   Musculoskeletal:      Right lower leg: Edema present.      Left lower leg: Edema present.      Comments: Bilateral ankle edema   Skin:     General: Skin is warm and dry.   Neurological:      Mental Status: He is alert and oriented to person, place, and time.           Labs:     Blood Tests:  Lab Results   Component Value Date    BNP 89 12/27/2024     01/03/2025    K 3.6 01/03/2025     01/03/2025    CO2 25 01/03/2025    BUN 32 (H) 01/03/2025    CREATININE 1.1 01/03/2025     (H) 01/03/2025    HGBA1C 5.7 (H) 03/06/2024    MG 2.0 12/29/2024    AST 21 01/03/2025    ALT 31 01/03/2025    ALBUMIN 3.4 (L) 01/03/2025    PROT 6.6 01/03/2025    BILITOT 0.7 01/03/2025    WBC 12.41 01/03/2025    HGB 12.3 (L) 01/03/2025    HCT 40.2 01/03/2025    HCT 35 (L) 12/27/2024    MCV 88 01/03/2025     01/03/2025    INR 1.0 03/20/2024    TSH 1.027 01/03/2025       Lab Results   Component Value Date    CHOL 98 (L) 03/06/2024    HDL 37 (L) 03/06/2024    TRIG 55 03/06/2024       Lab Results   Component Value Date    LDLCALC 50.0 (L) 03/06/2024       Lab Results   Component Value Date    TSH 1.027 01/03/2025       Lab Results   Component Value Date    HGBA1C 5.7 (H) 03/06/2024         Imaging: "     Echocardiogram  TTE 12/2023    Left Ventricle: The left ventricle is normal in size. Normal wall thickness. Normal wall motion. There is normal systolic function with a visually estimated ejection fraction of 60 - 65%. There is normal diastolic function.    Right Ventricle: Normal right ventricular cavity size. Wall thickness is normal. Right ventricle wall motion  is normal. Systolic function is normal.    Pulmonary Artery: The estimated pulmonary artery systolic pressure is 20 mmHg.    IVC/SVC: Normal venous pressure at 3 mmHg.    TTE 3/2015    1 - Normal left ventricular systolic function (EF 60-65%).     2 - No segmental wall motion abnormalities.     3 - Normal left ventricular diastolic function.     4 - Normal right ventricular systolic function .     Stress testing  PET Stress 11/2024    There are no clinically significant perfusion abnormalities. There is a small (<10%) amount of mild resting heterogeneity that improves with stress, indicative of non-obstructive disease.    The whole heart absolute myocardial perfusion values were elevated at rest, normal during stress and CFR is mildly reduced in part due to elevated resting flow.    CT attenuation images demonstrate severe diffuse coronary calcifications in the LAD, LCX and RCA territory and mild diffuse aortic calcifications in the descending aorta.    The gated perfusion images showed an ejection fraction of 70% at rest and 70% during stress. A normal ejection fraction is greater than 47%.    There is normal wall motion at rest and normal wall motion during stress.    The study's ECG is negative for ischemia.    Treadmill Stress 5/2014  1. The EKG portion of this study is negative for ischemia at a moderate workload, and peak heart rate of 146 bpm (95% of predicted).   2. Exercise capacity is average.   3. Blood pressure response to exercise was normal (Presenting BP: 119/68 Peak BP: 176/78).   4. The following arrhythmias were present: rare PVCs.    5. The patient reported dyspnea during the protocol which resolved in recovery.   6. The Duke treadmill score was 6 suggesting a low probability for future cardiovascular events.     Cath Lab  [facility] 10/2013  DIAGNOSTIC:       Patient has a right dominant coronary artery.      - Left Main Coronary Artery:              The LM is normal. There is JOHN 3 flow.      - Left Anterior Descending Artery:              The proximal LAD is occluded. There is JOHN 0 flow.                      Lesion Details:  This is a Type C lesion.  The length is 20mm, none to mild calcification. The lesion is ulcerated with severe thrombus. Bifurcation is present.      - Left Circumflex Artery:              The LCX has luminal irregularities. There is JOHN 3 flow.      - Right Coronary Artery:              The proximal RCA has a 40% stenosis. There is JOHN 3 flow.      - Common Femoral Artery:              The right CFA is normal.   INTERVENTION:       Proximal LAD:               The lesion was successfully intervened. Post-stenosis of 0%, post-JOHN 3 flow and TMP grade 3. The vessel was accessed natively.  The following items were used: Balloon Sprinter Legend Rx 2.5 X 20, Balloon Nc Quantum Rx 3.5 X 20, Balloon Nc Sprinter Rx 4.0 X 9, Catheter Quickcat and Stent Xience Prime Ll Rx 3.0 X 33 (MARI).   SUMMARY:  1. prox LAD occlusion   2. Xience MARI 3.0 x 33, post-dilated distally with 3.5 NC and proximally with 4.0 NC balloon.   3. LVEF 55%, anterior and apical hypokinesis     Other  None    EK/8/25 - Sinus rhythm with Premature atrial complexes with Aberrant conduction, 81 bpm  Rightward axis  Pulmonary disease pattern     24 - Sinus rhythm with Premature supraventricular complexes and with occasional Premature ventricular complexes and Fusion complexes, 94 bpm  Right ventricular conduction delay   Rightward axis   Abnormal R wave progression   Cannot rule out Septal infarct ,age undetermined   Abnormal ECG     Assessment:     1.  HTN (hypertension), benign    2. Coronary artery disease involving native coronary artery of native heart without angina pectoris    3. Dyslipidemia    4. Aortic atherosclerosis    5. PVC's (premature ventricular contractions)    6. Premature complex, atrial    7. Premature ventricular complex    8. Bilateral swelling of feet and ankles        Plan:     HTN (hypertension), benign  Bilateral swelling of feet and ankles  Asymptomatic elevated blood pressures and ankle swelling likely transient in the setting of current steroid course. His BP was previously well-controlled, on the low-normal side. He has no other signs/sx/hx of CHF.  He has 2 weeks left of the prednisone taper. Would be reasonable to continue current medication regimen with home BP monitoring and re-visit in about 4 weeks after steroid taper has been completed.  He is eligible for digital medicine, which we will re-visit at a later date once he is not on prednisone.    Premature atrial complex  Premature ventricular complex  EKG today performed, pt with known asymptomatic PACs/PVCs but wanted to rule out other arrhythmia in light of ankle swelling and HTN. Reassurance given.     Coronary artery disease involving native coronary artery of native heart without angina pectoris  Stable. No anginal symptoms. Recent PET stress negative for ischemia. Continue current medication regimen.     Dyslipidemia  Aortic atherosclerosis  Continue statin therapy.      Signed:  Lashonda Dunaway PA-C  [facility]     1/8/2025     Follow-up:     Future Appointments   Date Time Provider Department Center   1/15/2025  1:00 PM Lacey Ames Au.D, [facility] AUDIO [facility]   1/15/2025  1:30 PM Lacey Ames Au.D, [facility] AUDIO [facility]   1/24/2025  8:30 AM [facility] OIC-MRI1 [facility] MRI [facility]   1/24/2025 10:00 AM Bienvenido Henson MD [facility] RAD ONC [facility]   1/29/2025  9:05 AM LAB, APPOINTMENT [facility] LAB VNP [facility]   2/11/2025 10:00 AM Gume  POOJA Gallego [facility] CARDIO [facility]   4/7/2025 10:00 AM [facility]

## 2025-01-06 NOTE — PROCEDURES
"Routine Foot Care    Date/Time: 1/6/2025 9:15 AM    Performed by: Garrett Lloyd DPM  Authorized by: Garrett Lloyd DPM    Time out: Immediately prior to procedure a "time out" was called to verify the correct patient, procedure, equipment, support staff and site/side marked as required.    Consent Done?:  Yes (Verbal)  Hyperkeratotic Skin Lesions?: No      Nail Care Type:  Debride(Left 2nd Toe and Right 2nd Toe)  Patient tolerance:  Patient tolerated the procedure well with no immediate complications    "

## 2025-01-06 NOTE — TELEPHONE ENCOUNTER
Patient called with concerns of his recent blood pressures (see portal message January 4). States he saw his podatrist today, and his BP was 163/90. Requesting appointment to be evaluated. Patient scheduled with TRISTA Heath.    Future Appointments   Date Time Provider Department Center   1/8/2025  9:00 AM Lashonda Dunaway PA-C [facility] CARDIO [facility]   1/15/2025  1:00 PM Lacey Ames Au.D, [facility]-A [facility] AUDIO [facility]   1/15/2025  1:30 PM Lacey Ames Au.D, [facility] AUDIO [facility]   1/24/2025  8:30 AM [facility] OIC-MRI1 [facility] MRI IC Imaging Ctr   1/24/2025 10:00 AM Bienvenido Henson MD [facility]   4/7/2025 10:00 AM Garrett Lloyd DPM [facility]

## 2025-01-06 NOTE — PROGRESS NOTES
Subjective:     Patient    Jordin Allen Jr. is a 79 y.o. male.    Problem    08/31/23: Presents for bilateral foot dry skin, calluses, nails. The skin of both feet is very dry and sometimes cracks and causes pain, he does not regularly apply lotion. He also has calluses on both feet that he has had debrided by outside podiatrists, they recur. He also has thick discolored brittle toenails which cause pain due to their thickness, length, and shape. He has tried antifungal medication before without improvement.     11/06/23: Returns for dry skin of both feet, painful calluses, painful thick discolored toenails. Dry skin has improved with regular use of urea cream. Painful calluses and nails have returned.     01/06/25: Returns for nail care for painful fungal nails, worst at right 2nd toenail. Using urea and ketoconazole creams on skin and nails of feet. Also now with worsening BLE swelling and elevated BP, needs to return to cardiology soon.     History    History obtained from patient and review of medical records.     Past Medical History:   Diagnosis Date    Benign neoplasm of colon 10/01/2013    Bronchitis chronic     CAD (coronary artery disease) 10/31/2013    COPD (chronic obstructive pulmonary disease)     Emphysema of lung     GERD (gastroesophageal reflux disease)     Hx of colonic polyps     Hypertension     NAFLD (nonalcoholic fatty liver disease) 03/27/2017    SHOLA on CPAP     RLS (restless legs syndrome)     Screen for colon cancer 08/30/2013       Past Surgical History:   Procedure Laterality Date    bilateral foot surgery  1993    CARDIAC CATHETERIZATION  2013    x1    CATARACT EXTRACTION, BILATERAL      COLON SURGERY  2013    Ace Mott MD    COLONOSCOPY N/A 3/21/2018    Procedure: COLONOSCOPY;  Surgeon: Terry Mott MD;  Location: [facility] ([address] [place] [address]);  Service: Endoscopy;  Laterality: N/A;    COLONOSCOPY N/A 4/12/2019    Procedure: COLONOSCOPY;  Surgeon: John Mantilla MD;  Location:  [facility] ENDO (4TH FLR);  Service: Endoscopy;  Laterality: N/A;    COLONOSCOPY N/A 2022    Procedure: COLONOSCOPY;  Surgeon: MEDINA Farris MD;  Location: [facility] ENDO (4TH FLR);  Service: Endoscopy;  Laterality: N/A;  fully vacc-inst portal-tb    ENDOBRONCHIAL ULTRASOUND Bilateral 2024    Procedure: ENDOBRONCHIAL ULTRASOUND (EBUS);  Surgeon: Naveed Aguero MD;  Location: [facility] OR;  Service: Pulmonary;  Laterality: Bilateral;    scrotal abscess removal          Objective:     Vitals  Wt Readings from Last 1 Encounters:   25 89.4 kg (197 lb 1.5 oz)     Temp Readings from Last 1 Encounters:   24 97.7 °F (36.5 °C) (Oral)     BP Readings from Last 1 Encounters:   25 (!) 163/94     Pulse Readings from Last 1 Encounters:   25 86       Dermatological Exam    Skin:  Pedal hair growth, skin color, and skin texture normal on right; dry acral skin; painful calluses at 1st MTPJ and 5th MTPJ  Pedal hair growth, skin color, and skin texture normal on left; dry acral skin; painful calluses x2 at 1st toe sulcus, 5th MTPJ, and lateral midfoot       Nails:  Bilateral 2nd nail(s) elongated, bilateral 2nd nail(s) thickened, and bilateral 2nd nail(s) discolored; tender at both 2nd toenails but worse on right      Vascular Exam    Arteries:  Posterior tibial artery palpable on right  Dorsalis pedis artery palpable on right  Posterior tibial artery palpable on left  Dorsalis pedis artery palpable on left    Veins:  Superficial veins unremarkable on right  Superficial veins unremarkable on left    Swellin+ on right  2+ on left    Neurological Exam    [place] touch test:  6/6 sites sensed, light touch intact     Musculoskeletal Exam    Footwear:  Casual on right  Casual on left    Gait Exam:   Ambulatory Status: Ambulatory  Gait: Apropulsive  Assistive Devices: None    Foot Progression Angle:  Normal on right  Normal on left     Right Lower Extremity Additional Findings:  Right foot and ankle function,  strength, and range of motion unremarkable except as noted above.     Left Lower Extremity Additional Findings:  Left foot and ankle function, strength, and range of motion unremarkable except as noted above.    Imaging and Other Tests    Imaging:  Independently reviewed and interpreted imaging, findings are as follows: N/A     Assessment:     Encounter Diagnoses   Name Primary?    Localized swelling of both lower legs Yes    Tinea unguium     Pain in both feet           Plan:     I counseled the patient on his conditions, their implications and medical management.    Tinea unguium, pain: chronic stable   -Discussed treatment options including (1) monitoring, (2) debridement, (3) topical antifungals, (4) oral antifungals. Discussed potential risks, benefits, alternatives to each. Patient opted for debridement and topical antifungals.  -Nails debrided x2, thickness and length reduced using sterile nail nippers, see procedure note. Other nails trimmed as a courtesy.  -Ketoconazole and urea cream to be applied to toenails.      Calluses, pain: chronic stable  -Will return for routine foot care.   -Urea cream to be applied to calluses.      Swelling: chronic exacerbated  -PO furosemide until followup with cardiology.       Return to clinic in 3 months for routine foot care, call sooner PRN.

## 2025-01-06 NOTE — PROGRESS NOTES
Subjective:      Jordin Allen Jr. is a 79 y.o. male who was self-referred for hearing loss.     He denies ear pain or drainage. He does have a history of PE tubes bilaterally.       Past Medical History  He has a past medical history of Benign neoplasm of colon, Bronchitis chronic, CAD (coronary artery disease), COPD (chronic obstructive pulmonary disease), Emphysema of lung, GERD (gastroesophageal reflux disease), colonic polyps, Hypertension, NAFLD (nonalcoholic fatty liver disease), SHOLA on CPAP, RLS (restless legs syndrome), and Screen for colon cancer.    Past Surgical History  He has a past surgical history that includes Cataract extraction, bilateral; bilateral foot surgery (1993); Colon surgery (2013); Cardiac catheterization (2013); Colonoscopy (N/A, 3/21/2018); scrotal abscess removal; Colonoscopy (N/A, 4/12/2019); Colonoscopy (N/A, 5/31/2022); and Endobronchial ultrasound (Bilateral, 4/30/2024).    Family History  His family history includes Heart attack in his mother; Heart disease in his mother; Hypertension in his mother; No Known Problems in his daughter and sister.    Social History  He reports that he quit smoking about 12 years ago. His smoking use included cigarettes. He started smoking about 52 years ago. He has a 40 pack-year smoking history. He has never been exposed to tobacco smoke. He has never used smokeless tobacco. He reports current alcohol use of about 3.0 standard drinks of alcohol per week. He reports that he does not use drugs.    Allergies  He is allergic to brilinta [ticagrelor], lisinopril, and metoprolol succinate.    Medications  He has a current medication list which includes the following prescription(s): albuterol, albuterol, amitriptyline, aspirin, atorvastatin, beta-carotene(a)-vits c,e/mins, breztri aerosphere, echinacea, fluticasone propionate, furosemide, latanoprost, losartan, nitroglycerin, nystatin, omeprazole, prednisone, roflumilast, senna, trazodone, and  mirtazapine, and the following Facility-Administered Medications: dextrose 50%, dextrose 50%, and insulin regular.    Review of Systems   Constitutional: Negative.  Negative for chills, fever and unexpected weight change.   HENT:  Positive for hearing loss, postnasal drip and tinnitus. Negative for ear discharge, ear pain, sore throat and trouble swallowing.    Eyes: Negative.    Respiratory:  Positive for cough, shortness of breath and wheezing.    Cardiovascular: Negative.    Gastrointestinal: Negative.    Endocrine: Negative.    Genitourinary: Negative.    Musculoskeletal: Negative.    Skin: Negative.    Allergic/Immunologic: Negative.    Neurological: Negative.  Negative for dizziness and headaches.   Hematological: Negative.    Psychiatric/Behavioral: Negative.  Negative for agitation and sleep disturbance. The patient is not nervous/anxious.           Objective:     There were no vitals taken for this visit.     Constitutional:   He appears well-developed and well-nourished.     Head:  Normocephalic and atraumatic.     Ears:    Ears:      L CI.    Procedure Note:    The patient was brought to the minor procedure room and placed under the operating microscope. Using a combination of suction, curettes and cup forceps the patient's cerumen impaction was removed. The tympanic membrane was evaluated and was unremarkable. The patient tolerated the procedure well. There were no complications.  Procedure note:    The patient was brought to the minor procedure room and placed in the supine position. The operating video microscope was brought on to the field and the right ear canal, tympanic membrane and other structures were visualized and shown the the patient and family. The patient tolerated the procedure well and there were no complications.    Procedure note:    The patient was brought to the minor procedure room and placed in the supine position. The video microscope was brought onto the field. The ear canal,  tympanic membrane and other structures were visualized and demonstrated to the patient and family. The patient tolerated the procedure well and there were no complications.        Procedure    None      Data Reviewed    WBC (K/uL)   Date Value   01/03/2025 12.41     Platelets (K/uL)   Date Value   01/03/2025 176      Creatinine (mg/dL)   Date Value   01/03/2025 1.1     TSH (uIU/mL)   Date Value   01/03/2025 1.027     Glucose (mg/dL)   Date Value   01/03/2025 114 (H)     Hemoglobin A1C (%)   Date Value   03/06/2024 5.7 (H)                Assessment:     1. Impacted cerumen of left ear    2. Presbycusis, unspecified laterality    2. Retained PET AS/ asymptomatic     Plan:         F/U 1 yr

## 2025-01-07 ENCOUNTER — PATIENT MESSAGE (OUTPATIENT)
Dept: ADMINISTRATIVE | Facility: OTHER | Age: 80
End: 2025-01-07
Payer: MEDICARE

## 2025-01-08 ENCOUNTER — PATIENT MESSAGE (OUTPATIENT)
Dept: ADMINISTRATIVE | Facility: OTHER | Age: 80
End: 2025-01-08
Payer: MEDICARE

## 2025-01-08 ENCOUNTER — OFFICE VISIT (OUTPATIENT)
Dept: CARDIOLOGY | Facility: CLINIC | Age: 80
End: 2025-01-08
Payer: MEDICARE

## 2025-01-08 VITALS
HEIGHT: 69 IN | OXYGEN SATURATION: 99 % | BODY MASS INDEX: 29.19 KG/M2 | WEIGHT: 197.06 LBS | HEART RATE: 80 BPM | SYSTOLIC BLOOD PRESSURE: 168 MMHG | DIASTOLIC BLOOD PRESSURE: 91 MMHG

## 2025-01-08 DIAGNOSIS — E78.5 DYSLIPIDEMIA: ICD-10-CM

## 2025-01-08 DIAGNOSIS — I10 HTN (HYPERTENSION), BENIGN: Primary | ICD-10-CM

## 2025-01-08 DIAGNOSIS — I49.3 PREMATURE VENTRICULAR COMPLEX: ICD-10-CM

## 2025-01-08 DIAGNOSIS — M25.472 BILATERAL SWELLING OF FEET AND ANKLES: ICD-10-CM

## 2025-01-08 DIAGNOSIS — M25.475 BILATERAL SWELLING OF FEET AND ANKLES: ICD-10-CM

## 2025-01-08 DIAGNOSIS — M25.474 BILATERAL SWELLING OF FEET AND ANKLES: ICD-10-CM

## 2025-01-08 DIAGNOSIS — M25.471 BILATERAL SWELLING OF FEET AND ANKLES: ICD-10-CM

## 2025-01-08 DIAGNOSIS — I70.0 AORTIC ATHEROSCLEROSIS: ICD-10-CM

## 2025-01-08 DIAGNOSIS — I49.3 PVC'S (PREMATURE VENTRICULAR CONTRACTIONS): ICD-10-CM

## 2025-01-08 DIAGNOSIS — I25.10 CORONARY ARTERY DISEASE INVOLVING NATIVE CORONARY ARTERY OF NATIVE HEART WITHOUT ANGINA PECTORIS: Chronic | ICD-10-CM

## 2025-01-08 DIAGNOSIS — I49.1: ICD-10-CM

## 2025-01-08 PROBLEM — M25.473 ANKLE SWELLING: Status: ACTIVE | Noted: 2025-01-08

## 2025-01-08 LAB
OHS QRS DURATION: 82 MS
OHS QTC CALCULATION: 460 MS

## 2025-01-08 PROCEDURE — 99214 OFFICE O/P EST MOD 30 MIN: CPT | Mod: PBBFAC,25

## 2025-01-08 PROCEDURE — 93005 ELECTROCARDIOGRAM TRACING: CPT | Mod: PBBFAC | Performed by: INTERNAL MEDICINE

## 2025-01-08 PROCEDURE — 93010 ELECTROCARDIOGRAM REPORT: CPT | Mod: S$PBB,,, | Performed by: INTERNAL MEDICINE

## 2025-01-08 PROCEDURE — 99999 PR PBB SHADOW E&M-EST. PATIENT-LVL IV: CPT | Mod: PBBFAC,,,

## 2025-01-08 NOTE — Clinical Note
Luis Nuñez,  Mr. Allen asked me to forward you our clinic visit today to keep you in the loop. I suspect this is all transient side effects to current prednisone taper. We will follow as the taper decreases/ends.  Thank you! Lahsonda Dunaway PA-C

## 2025-01-09 ENCOUNTER — PATIENT MESSAGE (OUTPATIENT)
Dept: ADMINISTRATIVE | Facility: OTHER | Age: 80
End: 2025-01-09
Payer: MEDICARE

## 2025-01-10 ENCOUNTER — PATIENT MESSAGE (OUTPATIENT)
Dept: ADMINISTRATIVE | Facility: OTHER | Age: 80
End: 2025-01-10
Payer: MEDICARE

## 2025-01-10 ENCOUNTER — PATIENT MESSAGE (OUTPATIENT)
Dept: PALLIATIVE MEDICINE | Facility: CLINIC | Age: 80
End: 2025-01-10
Payer: MEDICARE

## 2025-01-10 RX ORDER — TRAZODONE HYDROCHLORIDE 50 MG/1
50 TABLET ORAL NIGHTLY
Qty: 30 TABLET | Refills: 2 | Status: SHIPPED | OUTPATIENT
Start: 2025-01-10 | End: 2025-04-10

## 2025-01-11 ENCOUNTER — PATIENT MESSAGE (OUTPATIENT)
Dept: ADMINISTRATIVE | Facility: OTHER | Age: 80
End: 2025-01-11
Payer: MEDICARE

## 2025-01-12 ENCOUNTER — PATIENT MESSAGE (OUTPATIENT)
Dept: ADMINISTRATIVE | Facility: OTHER | Age: 80
End: 2025-01-12
Payer: MEDICARE

## 2025-01-13 ENCOUNTER — TELEPHONE (OUTPATIENT)
Dept: ADMINISTRATIVE | Facility: CLINIC | Age: 80
End: 2025-01-13
Payer: MEDICARE

## 2025-01-13 ENCOUNTER — PATIENT MESSAGE (OUTPATIENT)
Dept: ADMINISTRATIVE | Facility: OTHER | Age: 80
End: 2025-01-13
Payer: MEDICARE

## 2025-01-13 ENCOUNTER — TELEPHONE (OUTPATIENT)
Dept: HEMATOLOGY/ONCOLOGY | Facility: CLINIC | Age: 80
End: 2025-01-13
Payer: MEDICARE

## 2025-01-14 ENCOUNTER — OFFICE VISIT (OUTPATIENT)
Dept: INTERNAL MEDICINE | Facility: CLINIC | Age: 80
End: 2025-01-14
Payer: MEDICARE

## 2025-01-14 ENCOUNTER — PATIENT MESSAGE (OUTPATIENT)
Dept: ADMINISTRATIVE | Facility: OTHER | Age: 80
End: 2025-01-14
Payer: MEDICARE

## 2025-01-14 VITALS
TEMPERATURE: 98 F | SYSTOLIC BLOOD PRESSURE: 118 MMHG | WEIGHT: 189 LBS | BODY MASS INDEX: 27.99 KG/M2 | RESPIRATION RATE: 16 BRPM | OXYGEN SATURATION: 98 % | HEART RATE: 73 BPM | DIASTOLIC BLOOD PRESSURE: 84 MMHG | HEIGHT: 69 IN

## 2025-01-14 DIAGNOSIS — M25.475 BILATERAL SWELLING OF FEET AND ANKLES: ICD-10-CM

## 2025-01-14 DIAGNOSIS — I25.10 CORONARY ARTERY DISEASE INVOLVING NATIVE CORONARY ARTERY OF NATIVE HEART WITHOUT ANGINA PECTORIS: Chronic | ICD-10-CM

## 2025-01-14 DIAGNOSIS — G47.33 OSA (OBSTRUCTIVE SLEEP APNEA): ICD-10-CM

## 2025-01-14 DIAGNOSIS — M25.471 BILATERAL SWELLING OF FEET AND ANKLES: ICD-10-CM

## 2025-01-14 DIAGNOSIS — G47.00 INSOMNIA, UNSPECIFIED TYPE: ICD-10-CM

## 2025-01-14 DIAGNOSIS — G25.81 RESTLESS LEG SYNDROME: ICD-10-CM

## 2025-01-14 DIAGNOSIS — J44.9 CHRONIC OBSTRUCTIVE PULMONARY DISEASE, UNSPECIFIED COPD TYPE: ICD-10-CM

## 2025-01-14 DIAGNOSIS — F41.9 ANXIETY: ICD-10-CM

## 2025-01-14 DIAGNOSIS — E66.811 CLASS 1 OBESITY DUE TO EXCESS CALORIES WITH SERIOUS COMORBIDITY AND BODY MASS INDEX (BMI) OF 33.0 TO 33.9 IN ADULT: ICD-10-CM

## 2025-01-14 DIAGNOSIS — K21.9 GASTROESOPHAGEAL REFLUX DISEASE WITHOUT ESOPHAGITIS: ICD-10-CM

## 2025-01-14 DIAGNOSIS — M25.472 BILATERAL SWELLING OF FEET AND ANKLES: ICD-10-CM

## 2025-01-14 DIAGNOSIS — C79.31 METASTASIS TO BRAIN: ICD-10-CM

## 2025-01-14 DIAGNOSIS — Z99.81 DEPENDENCE ON SUPPLEMENTAL OXYGEN: ICD-10-CM

## 2025-01-14 DIAGNOSIS — E78.5 DYSLIPIDEMIA: ICD-10-CM

## 2025-01-14 DIAGNOSIS — I10 HTN (HYPERTENSION), BENIGN: ICD-10-CM

## 2025-01-14 DIAGNOSIS — E66.09 CLASS 1 OBESITY DUE TO EXCESS CALORIES WITH SERIOUS COMORBIDITY AND BODY MASS INDEX (BMI) OF 33.0 TO 33.9 IN ADULT: ICD-10-CM

## 2025-01-14 DIAGNOSIS — I70.0 AORTIC ATHEROSCLEROSIS: ICD-10-CM

## 2025-01-14 DIAGNOSIS — Z00.00 ENCOUNTER FOR PREVENTIVE HEALTH EXAMINATION: Primary | ICD-10-CM

## 2025-01-14 DIAGNOSIS — C34.92 ADENOCARCINOMA, LUNG, LEFT: ICD-10-CM

## 2025-01-14 DIAGNOSIS — M25.474 BILATERAL SWELLING OF FEET AND ANKLES: ICD-10-CM

## 2025-01-14 PROCEDURE — 99999 PR PBB SHADOW E&M-EST. PATIENT-LVL V: CPT | Mod: PBBFAC,,, | Performed by: NURSE PRACTITIONER

## 2025-01-14 PROCEDURE — 99215 OFFICE O/P EST HI 40 MIN: CPT | Mod: PBBFAC,PO | Performed by: NURSE PRACTITIONER

## 2025-01-14 PROCEDURE — G0439 PPPS, SUBSEQ VISIT: HCPCS | Mod: ,,, | Performed by: NURSE PRACTITIONER

## 2025-01-14 RX ORDER — TRAZODONE HYDROCHLORIDE 50 MG/1
50 TABLET ORAL NIGHTLY
Qty: 30 TABLET | Refills: 5 | Status: SHIPPED | OUTPATIENT
Start: 2025-01-14

## 2025-01-14 NOTE — PATIENT INSTRUCTIONS
Counseling and Referral of Other Preventative  (Italic type indicates deductible and co-insurance are waived)    Patient Name: Jordin Allen  Today's Date: 1/14/2025    Health Maintenance       Date Due Completion Date    Lipid Panel 03/06/2029 3/6/2024    TETANUS VACCINE 11/13/2029 11/13/2019        Orders Placed This Encounter   Procedures    Ambulatory Referral/Consult to Enhanced Annual Wellness Visit (eAWV)         The following information is provided to all patients.  This information is to help you find resources for any of the problems found today that may be affecting your health:                  Living healthy guide: www.[facility].[place].[facility]      Understanding Diabetes: www.diabetes.org      Eating healthy: www.cdc.gov/healthyweight      CDC home safety checklist: www.cdc.gov/steadi/patient.html      Agency on Aging: www.[place].[facility]      Alcoholics anonymous (AA): www.aa.org      Physical Activity: www.linda.nih.gov/hf9bgpa      Tobacco use: www.[employer].org         Counseling and Referral of Other Preventative  (Italic type indicates deductible and co-insurance are waived)    Patient Name: Jordin Allen  Today's Date: 1/14/2025    Health Maintenance       Date Due Completion Date    Lipid Panel 03/06/2029 3/6/2024    TETANUS VACCINE 11/13/2029 11/13/2019        Orders Placed This Encounter   Procedures    Ambulatory Referral/Consult to Enhanced Annual Wellness Visit (eAWV)       The following information is provided to all patients.  This information is to help you find resources for any of the problems found today that may be affecting your health:                  Living healthy guide: www.[facility].[place].[facility]      Understanding Diabetes: www.diabetes.org      Eating healthy: www.cdc.gov/healthyweight      [facility] home safety checklist: www.cdc.gov/steadi/patient.html      Agency on Aging: www.[place].gov      Alcoholics anonymous (AA): www.aa.org      Physical Activity:  www.linda.nih.gov/dc1jnph      Tobacco use: www.quitwithusla.org

## 2025-01-14 NOTE — PROGRESS NOTES
"  Jordin Allen presented for a follow-up Medicare AWV today. The following components were reviewed and updated:    Medical history  Family History  Social history  Allergies and Current Medications  Health Risk Assessment  Health Maintenance  Care Team    **See Completed Assessments for Annual Wellness visit with in the encounter summary    The following assessments were completed:  Depression Screening  Cognitive function Screening  Timed Get Up Test  Whisper Test        Opioid documentation:      Patient does not have a current opioid prescription.          Vitals:    25 0932   BP: 118/84   BP Location: Left arm   Patient Position: Sitting   Pulse: 73   Resp: 16   Temp: 98.1 °F (36.7 °C)   TempSrc: Temporal   SpO2: 98%   Weight: 85.7 kg (189 lb)   Height: 5' 9" (1.753 m)     Body mass index is 27.91 kg/m².     Physical Exam  Vitals reviewed.   Constitutional:       Appearance: Normal appearance.   HENT:      Head: Normocephalic.   Eyes:      Extraocular Movements: Extraocular movements intact.      Pupils: Pupils are equal, round, and reactive to light.   Cardiovascular:      Rate and Rhythm: Normal rate and regular rhythm.   Pulmonary:      Effort: Pulmonary effort is normal.      Breath sounds: Normal breath sounds.   Abdominal:      General: Abdomen is flat. Bowel sounds are normal.      Palpations: Abdomen is soft.   Musculoskeletal:         General: Normal range of motion.      Cervical back: Normal range of motion.      Right lower le+ Edema present.      Left lower le+ Edema present.   Skin:     General: Skin is warm and dry.   Neurological:      General: No focal deficit present.      Mental Status: He is alert.   Psychiatric:         Mood and Affect: Mood normal.       Diagnoses and health risks identified today and associated recommendations/orders:  1. Encounter for preventive health examination  - Ambulatory Referral/Consult to Enhanced Annual Wellness Visit (eAWV); Future    2. HTN " (hypertension), benign  3. Aortic atherosclerosis  4. Coronary artery disease involving native coronary artery of native heart without angina pectoris  -Chronic, stable. Managed by PCP and Cardiology.  -Continue with Losartan    5. Anxiety  -Chronic, stable. Managed by PCP  -Continue with Trazodone    6. Chronic obstructive pulmonary disease, unspecified COPD type  8. Adenocarcinoma, lung, left  -Chronic, stable. Managed by Pulm, Heme/onc    7. SHOLA (obstructive sleep apnea)  -Chronic, stable  -Continue with CPAP machine      9. Metastasis to brain  -Chronic, stable. Managed by Heme/onc    10. Dyslipidemia  -Chronic, stable. Managed by PCP and Cardiology  -Continue with Atorvastatin    11. Restless leg syndrome  -Chronic, stable.  -Managed by PCP. Continue with OTC lidocaine cream.    12. Gastroesophageal reflux disease without esophagitis  -Chronic, stable  -Managed by PCP. Continue with Omeprazole    13. Bilateral swelling of feet and ankles  -Chronic, stable.   -Managed by podiatry. Continue with Furosemide    15. Insomnia, unspecified type  -Chronic, stable  -Refill of Trazodone provided  - traZODone (DESYREL) 50 MG tablet; Take 1 tablet (50 mg total) by mouth every evening.  Dispense: 30 tablet; Refill: 5    16. Dependence on supplemental oxygen  -Chronic, stable. Managed by Pulmonology.     Provided Jordin with a 5-10 year written screening schedule and personal prevention plan. Recommendations were developed using the USPSTF age appropriate recommendations. Education, counseling, and referrals were provided as needed.  After Visit Summary printed and given to patient which includes a list of additional screenings\tests needed.    No follow-ups on file.      Lou Muro NP    I offered to discuss advanced care planning, including how to pick a person who would make decisions for you if you were unable to make them for yourself, called a health care power of , and what kind of decisions you might make  such as use of life sustaining treatments such as ventilators and tube feeding when faced with a life limiting illness recorded on a living will that they will need to know. (How you want to be cared for as you near the end of your natural life)     X  Patient has advanced directives written and agrees to provide copies to the institution.

## 2025-01-15 ENCOUNTER — PATIENT MESSAGE (OUTPATIENT)
Dept: ADMINISTRATIVE | Facility: OTHER | Age: 80
End: 2025-01-15
Payer: MEDICARE

## 2025-01-15 ENCOUNTER — CLINICAL SUPPORT (OUTPATIENT)
Dept: AUDIOLOGY | Facility: CLINIC | Age: 80
End: 2025-01-15
Payer: MEDICARE

## 2025-01-15 ENCOUNTER — PATIENT MESSAGE (OUTPATIENT)
Dept: AUDIOLOGY | Facility: CLINIC | Age: 80
End: 2025-01-15

## 2025-01-15 DIAGNOSIS — H90.3 SENSORINEURAL HEARING LOSS, BILATERAL: Primary | ICD-10-CM

## 2025-01-15 DIAGNOSIS — H90.3 SENSORINEURAL HEARING LOSS (SNHL), BILATERAL: Primary | ICD-10-CM

## 2025-01-15 PROCEDURE — 99499 UNLISTED E&M SERVICE: CPT | Mod: S$PBB,,,

## 2025-01-15 PROCEDURE — 92557 COMPREHENSIVE HEARING TEST: CPT | Mod: PBBFAC

## 2025-01-15 PROCEDURE — 99999 PR PBB SHADOW E&M-EST. PATIENT-LVL II: CPT | Mod: PBBFAC,,,

## 2025-01-15 PROCEDURE — 92567 TYMPANOMETRY: CPT | Mod: PBBFAC

## 2025-01-15 PROCEDURE — 99212 OFFICE O/P EST SF 10 MIN: CPT | Mod: PBBFAC

## 2025-01-15 NOTE — PROGRESS NOTES
1/15/2025    Hearing Aid Follow-up    Jordin Allen Jr. was seen today for a hearing aid follow-up appointment. He is currently fit with the following devices:      [employer] [facility] sRI R D [address] [place] [address] Rechargeable  Purchase Date: 1/03/23   RT SN  [id number]   LT SN   [id number]   : P2   Dome: M  Warranty Exp 02/03/2026      Mr. Allen reported his left hearing aid has been cutting in and out. His most recent audiogram revealed a mild to moderate sensorineural hearing loss (SNHL) in the right ear and a moderate to moderately-severe SNHL with a mixed component at 500 Hz in the left ear. A clean and check of the hearing aids was performed, filters replaced, and domes cleaned. Inspection of the left device revealed that it was dead. Both hearing aids will be sent to [employer] for an in warranty repair and check. Compared to his previous audiogram, hearing aid programming adjustments were indicated due to a change in hearing. A loaner agreement was completed and signed (SNs: [id number]) and a pair of loaner hearing aids were programmed. Mr. Allen's updated audiogram was inputted into [employer], new settings were prescribed, and the feedback manager was run. The physical fit of the hearing aids is good and Mr. Allen reported good subjective benefit with the changes made today.    Recommendations:  Daily and consistent use of amplification  Hearing aid follow-up when hearing aids return from repair  Annual audiologic evaluation or sooner if change perceived  Hearing protection in noise

## 2025-01-15 NOTE — PROGRESS NOTES
Jordin Allen Jr. was seen today in the clinic for an audiologic evaluation.  Patient's main complaint was decreased hearing, bilaterally. Mr. Allen reported his hearing is worse in the left ear than in the right. He reported intermittent otitis media in the left ear, that he has had tubes placed previously to treat. Mr. Allen endorsed tinnitus occasionally in the left ear.    Tympanometry revealed Type A with significant negative middle ear pressure noted in the right ear and Type C in the left ear.     Audiogram results revealed a mild to moderate sensorineural hearing loss (SNHL) in the right ear and a moderate to moderately-severe SNHL with a mixed component at 500 Hz in the left ear.      Speech reception thresholds were noted at 45 dBHL in the right ear and 60 dBHL in the left ear.    Speech discrimination scores were 100% in the right ear and 92% in the left ear.    Recommendations:  Otologic evaluation  Hearing aid follow-up as scheduled  Daily and consistent use of amplification  Annual audiogram or sooner if change perceived  Hearing protection in noise         Requested call back to discuss continued nasal congestion.    Please call to discuss and ok to leave detailed message.

## 2025-01-16 ENCOUNTER — PATIENT MESSAGE (OUTPATIENT)
Dept: ADMINISTRATIVE | Facility: OTHER | Age: 80
End: 2025-01-16
Payer: MEDICARE

## 2025-01-17 ENCOUNTER — PATIENT MESSAGE (OUTPATIENT)
Dept: ADMINISTRATIVE | Facility: OTHER | Age: 80
End: 2025-01-17
Payer: MEDICARE

## 2025-01-18 ENCOUNTER — PATIENT MESSAGE (OUTPATIENT)
Dept: ADMINISTRATIVE | Facility: OTHER | Age: 80
End: 2025-01-18
Payer: MEDICARE

## 2025-01-19 ENCOUNTER — PATIENT MESSAGE (OUTPATIENT)
Dept: ADMINISTRATIVE | Facility: OTHER | Age: 80
End: 2025-01-19
Payer: MEDICARE

## 2025-01-20 ENCOUNTER — PATIENT MESSAGE (OUTPATIENT)
Dept: ADMINISTRATIVE | Facility: OTHER | Age: 80
End: 2025-01-20
Payer: MEDICARE

## 2025-01-21 ENCOUNTER — PATIENT MESSAGE (OUTPATIENT)
Dept: ADMINISTRATIVE | Facility: OTHER | Age: 80
End: 2025-01-21
Payer: MEDICARE

## 2025-01-22 ENCOUNTER — PATIENT MESSAGE (OUTPATIENT)
Dept: ADMINISTRATIVE | Facility: OTHER | Age: 80
End: 2025-01-22
Payer: MEDICARE

## 2025-01-23 ENCOUNTER — PATIENT MESSAGE (OUTPATIENT)
Dept: ADMINISTRATIVE | Facility: OTHER | Age: 80
End: 2025-01-23
Payer: MEDICARE

## 2025-01-24 ENCOUNTER — HOSPITAL ENCOUNTER (OUTPATIENT)
Dept: RADIOLOGY | Facility: HOSPITAL | Age: 80
Discharge: HOME | End: 2025-01-24
Attending: RADIOLOGY
Payer: MEDICARE

## 2025-01-24 ENCOUNTER — OFFICE VISIT (OUTPATIENT)
Dept: RADIATION ONCOLOGY | Facility: CLINIC | Age: 80
End: 2025-01-24
Payer: MEDICARE

## 2025-01-24 ENCOUNTER — OFFICE VISIT (OUTPATIENT)
Dept: NEUROSURGERY | Facility: CLINIC | Age: 80
End: 2025-01-24
Payer: MEDICARE

## 2025-01-24 ENCOUNTER — PATIENT MESSAGE (OUTPATIENT)
Dept: ADMINISTRATIVE | Facility: OTHER | Age: 80
End: 2025-01-24
Payer: MEDICARE

## 2025-01-24 VITALS
SYSTOLIC BLOOD PRESSURE: 133 MMHG | DIASTOLIC BLOOD PRESSURE: 77 MMHG | HEART RATE: 81 BPM | OXYGEN SATURATION: 97 % | RESPIRATION RATE: 18 BRPM | TEMPERATURE: 97 F

## 2025-01-24 DIAGNOSIS — C34.92 ADENOCARCINOMA, LUNG, LEFT: Primary | ICD-10-CM

## 2025-01-24 DIAGNOSIS — C79.31 METASTASIS TO BRAIN: Primary | ICD-10-CM

## 2025-01-24 DIAGNOSIS — C79.31 SECONDARY MALIGNANT NEOPLASM OF BRAIN: ICD-10-CM

## 2025-01-24 PROCEDURE — A9585 GADOBUTROL INJECTION: HCPCS | Performed by: RADIOLOGY

## 2025-01-24 PROCEDURE — 70553 MRI BRAIN STEM W/O & W/DYE: CPT | Mod: 26,,, | Performed by: RADIOLOGY

## 2025-01-24 PROCEDURE — 99999 PR PBB SHADOW E&M-EST. PATIENT-LVL III: CPT | Mod: PBBFAC,,, | Performed by: RADIOLOGY

## 2025-01-24 PROCEDURE — 99214 OFFICE O/P EST MOD 30 MIN: CPT | Mod: S$PBB,,, | Performed by: NEUROLOGICAL SURGERY

## 2025-01-24 PROCEDURE — 70553 MRI BRAIN STEM W/O & W/DYE: CPT | Mod: TC

## 2025-01-24 PROCEDURE — 99214 OFFICE O/P EST MOD 30 MIN: CPT | Mod: S$PBB,,, | Performed by: RADIOLOGY

## 2025-01-24 PROCEDURE — 99213 OFFICE O/P EST LOW 20 MIN: CPT | Mod: PBBFAC,25 | Performed by: RADIOLOGY

## 2025-01-24 PROCEDURE — 25500020 PHARM REV CODE 255: Performed by: RADIOLOGY

## 2025-01-24 RX ORDER — GADOBUTROL 604.72 MG/ML
9 INJECTION INTRAVENOUS
Status: COMPLETED | OUTPATIENT
Start: 2025-01-24 | End: 2025-01-24

## 2025-01-24 RX ADMIN — GADOBUTROL 9 ML: 604.72 INJECTION INTRAVENOUS at 09:01

## 2025-01-24 NOTE — PROGRESS NOTES
1/24/2025    Radiation Oncology Follow-Up Visit      Prior Radiation History:   Course 1:    Site  Energy  Dose/Fx (Gy)  #Fx  Total Dose (Gy)  Start Date  End Date  Elapsed Days    Left hilum/lung  6X  2  30 / 30  60  5/8/2024 6/18/2024  41      Course 2: GK SRT 8/2/24 - 8/7/24  Target # Site Dose per Fraction (Gy) Cumulative Dose % Isodose Line Fraction # Total Fractions   1 Lt Thalamic 9 27 51 3 3     Course 3: GK SRS 10/11/24  Target # Site Dose per Fraction (Gy) Cumulative Dose % Isodose Line Total Fractions   1 C2 Rt Temporal 20 20 56 1     Is the patient female between ages 15-55:  No    Does the patient have a CIED:  No      Assessment   This is a 79 y.o. 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 Deng. Staging MRI Brain 4/3/24 demonstrated a 0.8 cm Left thalamic metastasis; this was observed. MRI Brain 7/26/24 demonstrated interval increase in size to 2.2 cm; no other evidence of intracranial disease. He completed GK SRT 27 Gy in 3 fx on 8/7/24. MRI Brain 10/7/24 demonstrated new 1.9 cm Right medial temporo-occipital metastasis. He completed GK SRS 20 Gy x1 to Rt temporal metastasis 10/11/24. He returns for routine follow up.     No late toxicity from treatment. MRI Brain today 1/24/25 demonstrates interval decrease in size of previously treated Left thalamic and Right temporal metastases. Interval progression of a 0.4 cm posterior Right temporal lobe met that in hindsight was subtly present on MRI 10/7/24. I recommend treating this 18-20 Gy x1 with Gamma Knife radiosurgery. I discussed potential side effects including short-term vasogenic edema and late radiation necrosis, which could result in neurologic deficits. At the end of our discussion, he was in agreement with proceeding with the recommended treatment.           Plan   1) Schedule Gamma Knife SRS (mask, single fraction)           CHIEF COMPLAINT:  Brain metastasis    HPI/Focused ROS: No issues following prior GK treatment. His dyspnea was diagnosed as pneumonitis and significantly improved w/ steroids. Feeling well today. Denies focal numbness or weakness, gait imbalance, or speech difficulty.         Past Medical History:   Diagnosis Date    Benign neoplasm of colon 10/01/2013    Bronchitis chronic     CAD (coronary artery disease) 10/31/2013    COPD (chronic obstructive pulmonary disease)     Emphysema of lung     Encounter for preventive health examination 3/21/2018    GERD (gastroesophageal reflux disease)     Hx of colonic polyps     Hypertension     NAFLD (nonalcoholic fatty liver disease) 2017    SHOLA on CPAP     RLS (restless legs syndrome)     Screen for colon cancer 2013       Past Surgical History:   Procedure Laterality Date    bilateral foot surgery      CARDIAC CATHETERIZATION  2013    x1    CATARACT EXTRACTION, BILATERAL      COLON SURGERY      Ace Mott MD    COLONOSCOPY N/A 3/21/2018    Procedure: COLONOSCOPY;  Surgeon: Terry Mott MD;  Location: [facility] ([address] [place] [address]);  Service: Endoscopy;  Laterality: N/A;    COLONOSCOPY N/A 2019    Procedure: COLONOSCOPY;  Surgeon: John Mantilla MD;  Location: [facility] ([address] [place] [address]);  Service: Endoscopy;  Laterality: N/A;    COLONOSCOPY N/A 2022    Procedure: COLONOSCOPY;  Surgeon: MEDINA Farris MD;  Location: [facility] ([address] [place] [address]);  Service: Endoscopy;  Laterality: N/A;  fully vacc-inst portal-tb    ENDOBRONCHIAL ULTRASOUND Bilateral 2024    Procedure: ENDOBRONCHIAL ULTRASOUND (EBUS);  Surgeon: Naveed Aguero MD;  Location: [facility] [place] OR;  Service: Pulmonary;  Laterality: Bilateral;    scrotal abscess removal         Social History     Tobacco Use    Smoking status: Former     Current packs/day: 0.00     Average packs/day: 1 pack/day for 40.0 years (40.0 ttl pk-yrs)     Types: Cigarettes     Start date: 8/15/1972     Quit date: 8/15/2012     Years since quittin.4      Passive exposure: Never    Smokeless tobacco: Never   Substance Use Topics    Alcohol use: Yes     Alcohol/week: 3.0 standard drinks of alcohol     Types: 3 Cans of beer per week     Comment: maybe 2-3  beers weekly    Drug use: No       Cancer-related family history is negative for Prostate cancer.    Current Outpatient Medications on File Prior to Visit   Medication Sig Dispense Refill    albuterol (ACCUNEB) 0.63 mg/3 mL Nebu Inhale 3 mLs (0.63 mg total) by nebulization every 6 (six) hours as needed (if symptoms failed to improve with inhaler). Rescue 375 mL 11    albuterol (PROVENTIL/VENTOLIN HFA) 90 mcg/actuation inhaler Inhale 2 puffs into the lungs every 4 (four) hours as needed for Wheezing. 8.5 g 11    amitriptyline (ELAVIL) 25 MG tablet Take 1 tablet (25 mg total) by mouth once daily. 90 tablet 3    aspirin (ECOTRIN) 81 MG EC tablet Take 81 mg by mouth once daily.       atorvastatin (LIPITOR) 80 MG tablet TAKE 1 TABLET (80 MG TOTAL) BY MOUTH ONCE DAILY. 90 tablet 3    BETA-CAROTENE,A, W-C & E/MIN (OCUVITE ORAL) Take 1 capsule by mouth once daily.       budesonide-glycopyr-formoterol (BREZTRI AEROSPHERE) 160-9-4.8 mcg/actuation HFAA Inhale 2 puffs into the lungs 2 (two) times a day. 10.7 g 3    echinacea 400 mg Cap Take 1 capsule by mouth once daily.       fluticasone propionate (FLONASE) 50 mcg/actuation nasal spray Spray 2 sprays (100 mcg total) in Each Nostril once daily. 16 g 11    furosemide (LASIX) 20 MG tablet Take 1 tablet (20 mg total) by mouth once daily. 30 tablet 1    latanoprost 0.005 % ophthalmic solution Place 1 drop into both eyes once daily.       losartan (COZAAR) 100 MG tablet Take 1 tablet (100 mg total) by mouth once daily. 90 tablet 3    mirtazapine (REMERON) 7.5 MG Tab Take 1 tablet (7.5 mg total) by mouth every evening. 30 tablet 0    nitroGLYCERIN (NITROSTAT) 0.4 MG SL tablet Place 1 tablet (0.4 mg total) under the tongue every 5 (five) minutes as needed. 100 tablet 3    nystatin  (MYCOSTATIN) 100,000 unit/mL suspension Take 5 mLs (500,000 Units total) by mouth 4 (four) times daily. 473 mL 0    omeprazole (PRILOSEC) 20 MG capsule Take 1 capsule (20 mg total) by mouth once daily. 90 capsule 3    roflumilast (DALIRESP) 500 mcg Tab Take 1 tablet (500 mcg total) by mouth once daily. 90 tablet 3    senna (SENOKOT) 8.6 mg tablet Take 1 tablet by mouth once daily. 60 tablet 2    traZODone (DESYREL) 50 MG tablet Take 1 tablet (50 mg total) by mouth every evening. 30 tablet 5     Current Facility-Administered Medications on File Prior to Visit   Medication Dose Route Frequency Provider Last Rate Last Admin    dextrose 50% injection 12.5 g  12.5 g Intravenous PRN Lidia Deleon MD        dextrose 50% injection 25 g  25 g Intravenous PRN Lidia Deleon MD        insulin regular injection 0-8 Units  0-8 Units Intravenous Continuous PRN Lidia Deleon MD           Review of patient's allergies indicates:   Allergen Reactions    Brilinta [ticagrelor] Itching    Lisinopril      Other reaction(s): cough    Metoprolol succinate Rash         Vital Signs: /77 (Patient Position: Sitting)   Pulse 81   Temp 97 °F (36.1 °C)   Resp 18   SpO2 97%     ECOG Performance Status: (1) Restricted in physically strenuous activity, ambulatory and able to do work of light nature    Physical Exam  Constitutional:       Appearance: Normal appearance.   HENT:      Head: Normocephalic and atraumatic.   Eyes:      General: No scleral icterus.     Extraocular Movements: Extraocular movements intact.   Pulmonary:      Effort: No accessory muscle usage or respiratory distress.   Musculoskeletal:      Cervical back: Normal range of motion.   Neurological:      Mental Status: He is alert and oriented to person, place, and time.   Psychiatric:         Mood and Affect: Mood and affect normal.         Judgment: Judgment normal.          Labs:    Imaging: I have personally reviewed the patient's available images and  reports and summarized pertinent findings above in HPI.     Pathology: No new path

## 2025-01-24 NOTE — PROGRESS NOTES
Neurosurgery  History & Physical    SUBJECTIVE:     Chief Complaint: Brain metastasis    History of Present Illness:  Ms. Jordin Allen Jr. is a 79 y.o. gentleman with h/o HTN, SHOLA, COPD, adenocarcinoma of the lungs  who presented to the ED with a 3 day history of hypotension and dizziness s/p radiosurgery done on 8/2/24. He received 27Gy to the 51% isodose line delivered in three total fractions at the left thalamic tumor site.  MRI Brain W WO Contrast 10/7/2024: Interval decrease in size of the left thalamic lesion with improved edema and mass effect. New enhancing lesion at the right temporal occipital junction as above, presumed metastasis.  On 10/11/2024, He was treated for this with radiosurgery (20 Gy to the 56% isodose line).  The patient had an interval MRI that was done on 12/09/2024.  This was ordered by his oncologist because he was having headaches.  He has recently had a new MRI that shows a new punctate area of enhancement concerning for tumor.  The patient is here to discuss radiosurgery.        Review of patient's allergies indicates:   Allergen Reactions    Brilinta [ticagrelor] Itching    Lisinopril      Other reaction(s): cough    Metoprolol succinate Rash       Current Outpatient Medications   Medication Sig Dispense Refill    albuterol (ACCUNEB) 0.63 mg/3 mL Nebu Inhale 3 mLs (0.63 mg total) by nebulization every 6 (six) hours as needed (if symptoms failed to improve with inhaler). Rescue 375 mL 11    albuterol (PROVENTIL/VENTOLIN HFA) 90 mcg/actuation inhaler Inhale 2 puffs into the lungs every 4 (four) hours as needed for Wheezing. 8.5 g 11    amitriptyline (ELAVIL) 25 MG tablet Take 1 tablet (25 mg total) by mouth once daily. 90 tablet 3    aspirin (ECOTRIN) 81 MG EC tablet Take 81 mg by mouth once daily.       atorvastatin (LIPITOR) 80 MG tablet TAKE 1 TABLET (80 MG TOTAL) BY MOUTH ONCE DAILY. 90 tablet 3    BETA-CAROTENE,A, W-C & E/MIN (OCUVITE ORAL) Take 1 capsule by mouth once daily.        budesonide-glycopyr-formoterol (BREZTRI AEROSPHERE) 160-9-4.8 mcg/actuation HFAA Inhale 2 puffs into the lungs 2 (two) times a day. 10.7 g 3    echinacea 400 mg Cap Take 1 capsule by mouth once daily.       fluticasone propionate (FLONASE) 50 mcg/actuation nasal spray Spray 2 sprays (100 mcg total) in Each Nostril once daily. 16 g 11    furosemide (LASIX) 20 MG tablet Take 1 tablet (20 mg total) by mouth once daily. 30 tablet 1    latanoprost 0.005 % ophthalmic solution Place 1 drop into both eyes once daily.       losartan (COZAAR) 100 MG tablet Take 1 tablet (100 mg total) by mouth once daily. 90 tablet 3    mirtazapine (REMERON) 7.5 MG Tab Take 1 tablet (7.5 mg total) by mouth every evening. 30 tablet 0    nitroGLYCERIN (NITROSTAT) 0.4 MG SL tablet Place 1 tablet (0.4 mg total) under the tongue every 5 (five) minutes as needed. 100 tablet 3    nystatin (MYCOSTATIN) 100,000 unit/mL suspension Take 5 mLs (500,000 Units total) by mouth 4 (four) times daily. 473 mL 0    omeprazole (PRILOSEC) 20 MG capsule Take 1 capsule (20 mg total) by mouth once daily. 90 capsule 3    roflumilast (DALIRESP) 500 mcg Tab Take 1 tablet (500 mcg total) by mouth once daily. 90 tablet 3    senna (SENOKOT) 8.6 mg tablet Take 1 tablet by mouth once daily. 60 tablet 2    traZODone (DESYREL) 50 MG tablet Take 1 tablet (50 mg total) by mouth every evening. 30 tablet 5     No current facility-administered medications for this visit.     Facility-Administered Medications Ordered in Other Visits   Medication Dose Route Frequency Provider Last Rate Last Admin    dextrose 50% injection 12.5 g  12.5 g Intravenous PRN Lidia Deleon MD        dextrose 50% injection 25 g  25 g Intravenous PRN Lidia Deleon MD        insulin regular injection 0-8 Units  0-8 Units Intravenous Continuous PRN Lidia Deleon MD           Past Medical History:   Diagnosis Date    Benign neoplasm of colon 10/01/2013    Bronchitis chronic     CAD (coronary  artery disease) 10/31/2013    COPD (chronic obstructive pulmonary disease)     Emphysema of lung     Encounter for preventive health examination 3/21/2018    GERD (gastroesophageal reflux disease)     Hx of colonic polyps     Hypertension     NAFLD (nonalcoholic fatty liver disease) 2017    SHOLA on CPAP     RLS (restless legs syndrome)     Screen for colon cancer 2013     Past Surgical History:   Procedure Laterality Date    bilateral foot surgery      CARDIAC CATHETERIZATION  2013    x1    CATARACT EXTRACTION, BILATERAL      COLON SURGERY      Ace Mott MD    COLONOSCOPY N/A 3/21/2018    Procedure: COLONOSCOPY;  Surgeon: Terry Mott MD;  [facility]  Service: Endoscopy;  Laterality: N/A;    COLONOSCOPY N/A 2019    Procedure: COLONOSCOPY;  Surgeon: John Mantilla MD;  Location: [facility]  Service: Endoscopy;  Laterality: N/A;    COLONOSCOPY N/A 2022    Procedure: COLONOSCOPY;  Surgeon: MEDINA Farris MD;  Location: [facility]  Service: Endoscopy;  Laterality: N/A;  fully vacc-inst portal-tb    ENDOBRONCHIAL ULTRASOUND Bilateral 2024    Procedure: ENDOBRONCHIAL ULTRASOUND (EBUS);  Surgeon: Naveed Aguero MD;  Location: [facility] OR;  Service: Pulmonary;  Laterality: Bilateral;    scrotal abscess removal       Family History       Problem Relation (Age of Onset)    Heart attack Mother    Heart disease Mother    Hypertension Mother    No Known Problems Sister, Daughter          Social History     Socioeconomic History    Marital status:    Tobacco Use    Smoking status: Former     Current packs/day: 0.00     Average packs/day: 1 pack/day for 40.0 years (40.0 ttl pk-yrs)     Types: Cigarettes     Start date: 8/15/1972     Quit date: 8/15/2012     Years since quittin.4     Passive exposure: Never    Smokeless tobacco: Never   Substance and Sexual Activity    Alcohol use: Yes     Alcohol/week: 3.0 standard drinks of alcohol      Types: 3 Cans of beer per week     Comment: maybe 2-3  beers weekly    Drug use: No    Sexual activity: Yes     Partners: Female   Social History Narrative    Retired .  .       Social Drivers of Health     Financial Resource Strain: Low Risk  (12/30/2024)    Overall Financial Resource Strain (CARDIA)     Difficulty of Paying Living Expenses: Not hard at all   Food Insecurity: No Food Insecurity (12/30/2024)    Hunger Vital Sign     Worried About Running Out of Food in the Last Year: Never true     Ran Out of Food in the Last Year: Never true   Transportation Needs: No Transportation Needs (12/28/2024)    TRANSPORTATION NEEDS     Transportation : No   Physical Activity: Inactive (7/23/2024)    Exercise Vital Sign     Days of Exercise per Week: 0 days     Minutes of Exercise per Session: 0 min   Stress: Stress Concern Present (12/28/2024)    [employer] [place] [employer] - Occupational Stress Questionnaire     Feeling of Stress : Rather much   Housing Stability: Low Risk  (12/30/2024)    Housing Stability Vital Sign     Unable to Pay for Housing in the Last Year: No     Homeless in the Last Year: No       Review of Systems   Constitutional: Negative.    HENT: Negative.     Neurological: Negative.    Psychiatric/Behavioral: Negative.         OBJECTIVE:     Vital Signs     There is no height or weight on file to calculate BMI.      Physical Exam:    Constitutional: He appears well-developed and well-nourished.     Eyes: EOM are normal.     Musculoskeletal: Gait is normal.        Right Upper Extremities: Tone is normal.        Left Upper Extremities: Muscle strength is 5/5. Tone is normal.       Right Lower Extremities: Muscle strength is 5/5. Tone is normal.        Left Lower Extremities: Muscle strength is 5/5. Tone is normal.         Diagnostic Results:  MRI BRAIN W WO CONTRAST  1/24/2025  Interval decrease in size of enhancing lesions about the left dorsal thalamus and right medial  temporal occipital junction, with similar surrounding vasogenic edema.  No significant worsening in midline shift or mass effect.     Punctate focus subcortical enhancement right temporal lobe is slightly more conspicuous from comparison MRI.  Attention on follow-up imaging is recommended.     No evidence for acute intracranial pathology.       ASSESSMENT/PLAN:     I have talked to this patient in detail.  He agrees to have radiosurgery for his right temporal metastasis.  He has been seen by Dr. Bienvenido Henson.  We will plan for radiosurgery.        Note dictated with voice recognition software, please excuse any grammatical errors.

## 2025-01-25 ENCOUNTER — PATIENT MESSAGE (OUTPATIENT)
Dept: ADMINISTRATIVE | Facility: OTHER | Age: 80
End: 2025-01-25
Payer: MEDICARE

## 2025-01-26 ENCOUNTER — PATIENT MESSAGE (OUTPATIENT)
Dept: ADMINISTRATIVE | Facility: OTHER | Age: 80
End: 2025-01-26
Payer: MEDICARE

## 2025-01-27 ENCOUNTER — PATIENT MESSAGE (OUTPATIENT)
Dept: ADMINISTRATIVE | Facility: OTHER | Age: 80
End: 2025-01-27
Payer: MEDICARE

## 2025-01-28 ENCOUNTER — PATIENT MESSAGE (OUTPATIENT)
Dept: ADMINISTRATIVE | Facility: OTHER | Age: 80
End: 2025-01-28
Payer: MEDICARE

## 2025-01-29 ENCOUNTER — CLINICAL SUPPORT (OUTPATIENT)
Dept: AUDIOLOGY | Facility: CLINIC | Age: 80
End: 2025-01-29
Payer: MEDICARE

## 2025-01-29 ENCOUNTER — LAB VISIT (OUTPATIENT)
Dept: LAB | Facility: HOSPITAL | Age: 80
End: 2025-01-29
Payer: MEDICARE

## 2025-01-29 ENCOUNTER — PATIENT MESSAGE (OUTPATIENT)
Dept: ADMINISTRATIVE | Facility: OTHER | Age: 80
End: 2025-01-29
Payer: MEDICARE

## 2025-01-29 DIAGNOSIS — I10 HTN (HYPERTENSION), BENIGN: ICD-10-CM

## 2025-01-29 DIAGNOSIS — H90.3 SENSORINEURAL HEARING LOSS (SNHL), BILATERAL: Primary | ICD-10-CM

## 2025-01-29 LAB
ALBUMIN SERPL BCP-MCNC: 3.7 G/DL (ref 3.5–5.2)
ALP SERPL-CCNC: 90 U/L (ref 40–150)
ALT SERPL W/O P-5'-P-CCNC: 22 U/L (ref 10–44)
ANION GAP SERPL CALC-SCNC: 9 MMOL/L (ref 8–16)
AST SERPL-CCNC: 13 U/L (ref 10–40)
BILIRUB SERPL-MCNC: 1.4 MG/DL (ref 0.1–1)
BUN SERPL-MCNC: 24 MG/DL (ref 8–23)
CALCIUM SERPL-MCNC: 10.2 MG/DL (ref 8.7–10.5)
CHLORIDE SERPL-SCNC: 104 MMOL/L (ref 95–110)
CO2 SERPL-SCNC: 29 MMOL/L (ref 23–29)
CREAT SERPL-MCNC: 1.3 MG/DL (ref 0.5–1.4)
EST. GFR  (NO RACE VARIABLE): 56.6 ML/MIN/1.73 M^2
GLUCOSE SERPL-MCNC: 126 MG/DL (ref 70–110)
POTASSIUM SERPL-SCNC: 4.2 MMOL/L (ref 3.5–5.1)
PROT SERPL-MCNC: 7.3 G/DL (ref 6–8.4)
SODIUM SERPL-SCNC: 142 MMOL/L (ref 136–145)

## 2025-01-29 PROCEDURE — 99499 UNLISTED E&M SERVICE: CPT | Mod: S$PBB,,,

## 2025-01-29 PROCEDURE — 77470 SPECIAL RADIATION TREATMENT: CPT | Mod: 59,TC | Performed by: RADIOLOGY

## 2025-01-29 PROCEDURE — 36415 COLL VENOUS BLD VENIPUNCTURE: CPT

## 2025-01-29 PROCEDURE — 80053 COMPREHEN METABOLIC PANEL: CPT

## 2025-01-29 PROCEDURE — 77470 SPECIAL RADIATION TREATMENT: CPT | Mod: 26,59,, | Performed by: RADIOLOGY

## 2025-01-29 NOTE — PROGRESS NOTES
1/29/2025    Hearing Aid Follow-up    Jordin Allen Jr. was seen today for a hearing aid follow-up appointment to pickup an in warranty repair.     Mrs. Allen reported he is doing well with his hearing aids and verified that the changes made at his last visit are good. The hearing aids were sent of for repair on 01/15/2025 and according the [employer] website were not picked up until 1/24/2025. As of 1/27/2025 they were delivered to [employer]. Mr. Allen's repair pickup was rescheduled to 2/12/2025. I will call Mr. Allen the day before his visit to confirm that the devices have arrived in clinic. The physical fit of the hearing aids are good and Mr. Allen reported good subjective benefit.    Recommendations:  Daily and consistent use of amplification  Hearing aid follow-up to pickup repair when it arrives in clinic  Annual audiologic evaluation or sooner if change perceived  Hearing protection in noise

## 2025-01-30 ENCOUNTER — PATIENT MESSAGE (OUTPATIENT)
Dept: ADMINISTRATIVE | Facility: OTHER | Age: 80
End: 2025-01-30
Payer: MEDICARE

## 2025-01-31 ENCOUNTER — EXTERNAL CHRONIC CARE MANAGEMENT (OUTPATIENT)
Dept: PRIMARY CARE CLINIC | Facility: CLINIC | Age: 80
End: 2025-01-31
Payer: MEDICARE

## 2025-01-31 ENCOUNTER — PATIENT MESSAGE (OUTPATIENT)
Dept: ADMINISTRATIVE | Facility: OTHER | Age: 80
End: 2025-01-31
Payer: MEDICARE

## 2025-01-31 ENCOUNTER — PROCEDURE VISIT (OUTPATIENT)
Dept: RADIATION THERAPY | Facility: HOSPITAL | Age: 80
End: 2025-01-31
Payer: MEDICARE

## 2025-01-31 DIAGNOSIS — C79.31 METASTASIS TO BRAIN: Primary | ICD-10-CM

## 2025-01-31 PROCEDURE — 77295 3-D RADIOTHERAPY PLAN: CPT | Mod: TC | Performed by: RADIOLOGY

## 2025-01-31 PROCEDURE — 77300 RADIATION THERAPY DOSE PLAN: CPT | Mod: TC | Performed by: RADIOLOGY

## 2025-01-31 PROCEDURE — 77432 STEREOTACTIC RADIATION TRMT: CPT | Mod: ,,, | Performed by: RADIOLOGY

## 2025-01-31 PROCEDURE — 99490 CHRNC CARE MGMT STAFF 1ST 20: CPT | Mod: S$PBB,,, | Performed by: INTERNAL MEDICINE

## 2025-01-31 PROCEDURE — 77263 THER RADIOLOGY TX PLNG CPLX: CPT | Mod: ,,, | Performed by: RADIOLOGY

## 2025-01-31 PROCEDURE — 99490 CHRNC CARE MGMT STAFF 1ST 20: CPT | Mod: PBBFAC,PO | Performed by: INTERNAL MEDICINE

## 2025-01-31 PROCEDURE — 77295 3-D RADIOTHERAPY PLAN: CPT | Mod: 26,,, | Performed by: RADIOLOGY

## 2025-01-31 PROCEDURE — 77334 RADIATION TREATMENT AID(S): CPT | Mod: TC | Performed by: RADIOLOGY

## 2025-01-31 PROCEDURE — 61796 SRS CRANIAL LESION SIMPLE: CPT | Mod: ,,, | Performed by: NEUROLOGICAL SURGERY

## 2025-01-31 PROCEDURE — 77290 THER RAD SIMULAJ FIELD CPLX: CPT | Mod: 26,,, | Performed by: RADIOLOGY

## 2025-01-31 PROCEDURE — 77371 SRS MULTISOURCE: CPT | Performed by: RADIOLOGY

## 2025-01-31 PROCEDURE — 77290 THER RAD SIMULAJ FIELD CPLX: CPT | Mod: TC | Performed by: RADIOLOGY

## 2025-01-31 PROCEDURE — 77370 RADIATION PHYSICS CONSULT: CPT | Performed by: RADIOLOGY

## 2025-01-31 PROCEDURE — 77334 RADIATION TREATMENT AID(S): CPT | Mod: 26,,, | Performed by: RADIOLOGY

## 2025-01-31 PROCEDURE — 77300 RADIATION THERAPY DOSE PLAN: CPT | Mod: 26,,, | Performed by: RADIOLOGY

## 2025-01-31 RX ORDER — LORAZEPAM 1 MG/1
1 TABLET ORAL ONCE
Status: COMPLETED | OUTPATIENT
Start: 2025-01-31 | End: 2025-01-31

## 2025-01-31 RX ADMIN — LORAZEPAM 1 MG: 1 TABLET ORAL at 11:01

## 2025-01-31 NOTE — PROCEDURES
Patient: Jordin Allen Jr.    MRN: [id number]    : 1945    DATE OF PROCEDURE: 2025      PRE-OPERATIVE DIAGNOSIS:  Brain metastasis       POST-OPERATIVE DIAGNOSIS:  Same         PROCEDURE PERFORMED:                 1)         Gamma Knife stereotactic radiosurgery to brain metastasis.       RADIATION ONCOLOGIST: Bienvenido Henson      NEUROSURGEON:  TIMUR Neurosurgeon: Steven Campos       MEDICAL PHYSICIST: TIMUR Medical Physicist: Colton Johnson       PROCEDURE SUMMARY:    Target # Site Dose per Fraction (Gy) Cumulative Dose % Isodose Line Total Fractions   1 C3 Rt Temporal 22 22 80 1       INDICATIONS AND CONSENT:  Jordin Allen Jr. is a 79 y.o. male with Stage IV NSCLC (cT3 cN1 M1b) adeno diagnosed on EBUS 24 s/p definitive chemo-RT to 60 Gy in 30 fx completed 24 with Dr. Deng. Staging MRI Brain 4/3/24 demonstrated a 0.8 cm Left thalamic metastasis; this was observed. MRI Brain 24 demonstrated interval increase in size to 2.2 cm; no other evidence of intracranial disease. He completed GK SRT 27 Gy in 3 fx on 24. MRI Brain 10/7/24 demonstrated new 1.9 cm Right medial temporo-occipital metastasis. He completed GK SRS 20 Gy x1 to Rt temporal metastasis 10/11/24. MRI Brain 25 demonstrated a 0.4 cm posterior Right temporal lobe met. It was recommended that he undergo Gamma Knife stereotactic radiosurgery.  The risks of this procedure as well as possible complications were discussed with the patient pre-operatively.       DESCRIPTION OF PROCEDURE:  On the day of the procedure, the patient was positioned on the table in a custom-shaped immobilization mask.  Cone-beam CT was performed for stereotactic reference, and the images were imported into the Gamma Knife planning station. Images were co-registered with pre-planning MRI, which was used to define the target volume(s) and organs at risk. Under direct physician supervision, acceptable localization of target and normal tissue was achieved  with image guidance.  We targeted the lesion(s) with the dose(s) prescribed above to the margin of (each) lesion.  Treatment was carried out without difficulty using automated positioning.  Upon completion of the Gamma Knife procedure, the immobilization mask was removed.

## 2025-01-31 NOTE — PROGRESS NOTES
GAMMA KNIFE TREATMENT SUMMARY     NAME OF PATIENT:              ...     DATE:                                     ...     Neurosurgeon:                        Steven Campos M.D., Ph.D.     Radiation Oncologist:              Bienvenido Henson M.D.     DIAGNOSIS:                           ...                                                                                                      Operative Procedure:    Planning of ... gamma radiosurgery.  Delivery of ... gamma radiosurgery.       Indications in Detail:    Ms. Jordin Allen Jr. is a 79 y.o. gentleman with h/o HTN, SHOLA, COPD, adenocarcinoma of the lungs  who presented to the ED with a 3 day history of hypotension and dizziness s/p radiosurgery done on 8/2/24. He received 27Gy to the 51% isodose line delivered in three total fractions at the left thalamic tumor site.  MRI Brain W WO Contrast 10/7/2024: Interval decrease in size of the left thalamic lesion with improved edema and mass effect. New enhancing lesion at the right temporal occipital junction as above, presumed metastasis.  On 10/11/2024, He was treated for this with radiosurgery (20 Gy to the 56% isodose line).  The patient had an interval MRI that was done on 12/09/2024.  This was ordered by his oncologist because he was having headaches.  He has recently had a new MRI that shows a new punctate area of enhancement concerning for tumor.  The patient is here to discuss radiosurgery.                 Review of patient's allergies indicates:   Allergen Reactions    Brilinta [ticagrelor] Itching    Lisinopril         Other reaction(s): cough    Metoprolol succinate Rash         Current Medications          Current Outpatient Medications   Medication Sig Dispense Refill    albuterol (ACCUNEB) 0.63 mg/3 mL Nebu Inhale 3 mLs (0.63 mg total) by nebulization every 6 (six) hours as needed (if symptoms failed to improve with inhaler). Rescue 375 mL 11    albuterol (PROVENTIL/VENTOLIN HFA) 90 mcg/actuation  inhaler Inhale 2 puffs into the lungs every 4 (four) hours as needed for Wheezing. 8.5 g 11    amitriptyline (ELAVIL) 25 MG tablet Take 1 tablet (25 mg total) by mouth once daily. 90 tablet 3    aspirin (ECOTRIN) 81 MG EC tablet Take 81 mg by mouth once daily.         atorvastatin (LIPITOR) 80 MG tablet TAKE 1 TABLET (80 MG TOTAL) BY MOUTH ONCE DAILY. 90 tablet 3    BETA-CAROTENE,A, W-C & E/MIN (OCUVITE ORAL) Take 1 capsule by mouth once daily.         budesonide-glycopyr-formoterol (BREZTRI AEROSPHERE) 160-9-4.8 mcg/actuation HFAA Inhale 2 puffs into the lungs 2 (two) times a day. 10.7 g 3    echinacea 400 mg Cap Take 1 capsule by mouth once daily.         fluticasone propionate (FLONASE) 50 mcg/actuation nasal spray Spray 2 sprays (100 mcg total) in Each Nostril once daily. 16 g 11    furosemide (LASIX) 20 MG tablet Take 1 tablet (20 mg total) by mouth once daily. 30 tablet 1    latanoprost 0.005 % ophthalmic solution Place 1 drop into both eyes once daily.         losartan (COZAAR) 100 MG tablet Take 1 tablet (100 mg total) by mouth once daily. 90 tablet 3    mirtazapine (REMERON) 7.5 MG Tab Take 1 tablet (7.5 mg total) by mouth every evening. 30 tablet 0    nitroGLYCERIN (NITROSTAT) 0.4 MG SL tablet Place 1 tablet (0.4 mg total) under the tongue every 5 (five) minutes as needed. 100 tablet 3    nystatin (MYCOSTATIN) 100,000 unit/mL suspension Take 5 mLs (500,000 Units total) by mouth 4 (four) times daily. 473 mL 0    omeprazole (PRILOSEC) 20 MG capsule Take 1 capsule (20 mg total) by mouth once daily. 90 capsule 3    roflumilast (DALIRESP) 500 mcg Tab Take 1 tablet (500 mcg total) by mouth once daily. 90 tablet 3    senna (SENOKOT) 8.6 mg tablet Take 1 tablet by mouth once daily. 60 tablet 2    traZODone (DESYREL) 50 MG tablet Take 1 tablet (50 mg total) by mouth every evening. 30 tablet 5      No current facility-administered medications for this visit.                Facility-Administered Medications Ordered  in Other Visits   Medication Dose Route Frequency Provider Last Rate Last Admin    dextrose 50% injection 12.5 g  12.5 g Intravenous PRN Lidia Deleon MD        dextrose 50% injection 25 g  25 g Intravenous PRN Lidia Deleon MD        insulin regular injection 0-8 Units  0-8 Units Intravenous Continuous PRN Lidia Deleon MD                     Past Medical History:   Diagnosis Date    Benign neoplasm of colon 10/01/2013    Bronchitis chronic      CAD (coronary artery disease) 10/31/2013    COPD (chronic obstructive pulmonary disease)      Emphysema of lung      Encounter for preventive health examination 3/21/2018    GERD (gastroesophageal reflux disease)      Hx of colonic polyps      Hypertension      NAFLD (nonalcoholic fatty liver disease) 03/27/2017    SHOLA on CPAP      RLS (restless legs syndrome)      Screen for colon cancer 08/30/2013            Past Surgical History:   Procedure Laterality Date    bilateral foot surgery   1993    CARDIAC CATHETERIZATION   2013     x1    CATARACT EXTRACTION, BILATERAL        COLON SURGERY   2013     Ace Mott MD    COLONOSCOPY N/A 3/21/2018     Procedure: COLONOSCOPY;  Surgeon: Terry Mott MD;  Location: [facility] ([address] [place] [address]);  Service: Endoscopy;  Laterality: N/A;    COLONOSCOPY N/A 4/12/2019     Procedure: COLONOSCOPY;  Surgeon: John Mantilla MD;  [facility]  Service: Endoscopy;  Laterality: N/A;    COLONOSCOPY N/A 5/31/2022     Procedure: COLONOSCOPY;  Surgeon: MEDINA Farris MD;  [address]  Service: Endoscopy;  Laterality: N/A;  fully vacc-inst portal-tb    ENDOBRONCHIAL ULTRASOUND Bilateral 4/30/2024     Procedure: ENDOBRONCHIAL ULTRASOUND (EBUS);  Surgeon: Naveed Aguero MD;  Location: [facility];  Service: Pulmonary;  Laterality: Bilateral;    scrotal abscess removal          Family History         Problem Relation (Age of Onset)     Heart attack Mother     Heart disease Mother     Hypertension Mother      No Known Problems Sister, Daughter             Social History            Socioeconomic History    Marital status:    Tobacco Use    Smoking status: Former       Current packs/day: 0.00       Average packs/day: 1 pack/day for 40.0 years (40.0 ttl pk-yrs)       Types: Cigarettes       Start date: 8/15/1972       Quit date: 8/15/2012       Years since quittin.4       Passive exposure: Never    Smokeless tobacco: Never   Substance and Sexual Activity    Alcohol use: Yes       Alcohol/week: 3.0 standard drinks of alcohol       Types: 3 Cans of beer per week       Comment: maybe 2-3  beers weekly    Drug use: No    Sexual activity: Yes       Partners: Female   Social History Narrative     Retired .  .        Social Drivers of Health           Financial Resource Strain: Low Risk  (2024)     Overall Financial Resource Strain (CARDIA)      Difficulty of Paying Living Expenses: Not hard at all   Food Insecurity: No Food Insecurity (2024)     Hunger Vital Sign      Worried About Running Out of Food in the Last Year: Never true      Ran Out of Food in the Last Year: Never true   Transportation Needs: No Transportation Needs (2024)     TRANSPORTATION NEEDS      Transportation : No   Physical Activity: Inactive (2024)     Exercise Vital Sign      Days of Exercise per Week: 0 days      Minutes of Exercise per Session: 0 min   Stress: Stress Concern Present (2024)     [employer] - Occupational Stress Questionnaire      Feeling of Stress : Rather much   Housing Stability: Low Risk  (2024)     Housing Stability Vital Sign      Unable to Pay for Housing in the Last Year: No      Homeless in the Last Year: No         Review of Systems   Constitutional: Negative.    HENT: Negative.     Neurological: Negative.    Psychiatric/Behavioral: Negative.           OBJECTIVE:      Vital Signs  There is no height or weight on file to calculate  BMI.        Physical Exam:     Constitutional: He appears well-developed and well-nourished.      Eyes: EOM are normal.      Musculoskeletal: Gait is normal.        Right Upper Extremities: Tone is normal.        Left Upper Extremities: Muscle strength is 5/5. Tone is normal.       Right Lower Extremities: Muscle strength is 5/5. Tone is normal.        Left Lower Extremities: Muscle strength is 5/5. Tone is normal.            Diagnostic Results:  MRI BRAIN W WO CONTRAST  1/24/2025  Interval decrease in size of enhancing lesions about the left dorsal thalamus and right medial temporal occipital junction, with similar surrounding vasogenic edema.  No significant worsening in midline shift or mass effect.     Punctate focus subcortical enhancement right temporal lobe is slightly more conspicuous from comparison MRI.  Attention on follow-up imaging is recommended.     No evidence for acute intracranial pathology.        ASSESSMENT/PLAN:      I have talked to this patient in detail.  He agrees to have radiosurgery for his right temporal metastasis.  He has been seen by Dr. Bienvenido Henson.  We will plan for radiosurgery.        Procedure in Detail:  The patient was seen in the pretreatment area and the risks, benefits, and alternatives were discussed. The patient understood and wished to proceed. Dr. Campos, Dr. Henson and Dr. Johnson planned the radiosurgery based on a recent MRI that had been performed.  ... targets were identified: ....  The tumors were targeted using Gamma Plan (Lightning planned).  The treatment consisted of ....   Dr. Henson prescribed a dose of .... The patient had a mask that was made in used to immobilize him during the treatment.  The patient was immobilized using this mask.  The cone beam CT and plan were coregistered.  The patient was placed in the unit and the shots were delivered sequentially.  The patient was placed in the unit and the shots were delivered sequentially.  The total beam on-time  was ... minutes.  The patient tolerated the treatment well.      COMPLICATIONS:  None.  ESTIMATED BLOOD LOSS:  None.  DISPOSITION:  The patient is to followup in the [facility] clinic.           Dr. Steven Campos MD, Ph.D.

## 2025-01-31 NOTE — PATIENT INSTRUCTIONS
After Gamma Knife Treatment    You may return to your normal activities as you feel up to doing them usually 1-2 days after treatment.    Do not drive the day of your treatment.    Feeling tired is normal after therapy: Rest as needed, eat healthy and drink plenty fluids     Some patients get headaches from wearing the mask:  you can take Tylenol or Advil when you need to     When should I call the doctor?    Call your doctor right away if you have any of the following:   Seizures   A persistent severe headache: may be caused by swelling in the treated area of your brain. We often treat with a medication called Decadron to reduce the swelling    Vision changes    Nausea and/or vomiting    Numbness in your face, arms and/or legs   Weakness  Loss of balance       What follow-up care will I have?       The results of the Gamma Knife treatment do not happen right away.   They may show up over a period of time. You will have scans (CT, MRI   or angiography) done again to measure the success of the treatment.     The first scan is normally done about 2- 3 months after the treatment.   How often these scans will be done depends on your diagnosis.     It is very important to keep your follow up appointments with the   radiation oncologist and neurosurgeon. You will be given information   with the date, time and location for these appointments.  Who do I call if I have questions?     If you have questions about your Gamma Knife treatment :     The [facility] Radiation Oncology Department- Gamma Knife Suite at [phone].     To reach the neurosurgeon in the evenings or weekends, call the   hospital  at [phone] and ask for the neurosurgeon on   call.

## 2025-02-03 ENCOUNTER — TELEPHONE (OUTPATIENT)
Dept: RADIATION THERAPY | Facility: HOSPITAL | Age: 80
End: 2025-02-03
Payer: MEDICARE

## 2025-02-03 ENCOUNTER — HOSPITAL ENCOUNTER (OUTPATIENT)
Dept: RADIATION THERAPY | Facility: HOSPITAL | Age: 80
Discharge: HOME | End: 2025-02-03
Attending: RADIOLOGY
Payer: MEDICARE

## 2025-02-03 PROCEDURE — 77336 RADIATION PHYSICS CONSULT: CPT | Performed by: RADIOLOGY

## 2025-02-04 NOTE — PROCEDURES
GAMMA KNIFE TREATMENT SUMMARY     NAME OF PATIENT:              Jordin Allen     DATE:                                     1/31/2025     Neurosurgeon:                      Steven Campos M.D., Ph.D.     Radiation Oncologist:          Bienvenido Henson M.D.     DIAGNOSIS:                             1. Lung cancer.  2. Brain metastases.                                                                                       Operative Procedure:    Planning of gamma radiosurgery.  Delivery of gamma radiosurgery.       Indications in Detail:    Ms. Jordin Allen Jr. is a 79 y.o. gentleman with h/o HTN, SHOLA, COPD, adenocarcinoma of the lungs  who presented to the ED with a 3 day history of hypotension and dizziness s/p radiosurgery done on 8/2/24. He received 27Gy to the 51% isodose line delivered in three total fractions at the left thalamic tumor site.  MRI Brain W WO Contrast 10/7/2024: Interval decrease in size of the left thalamic lesion with improved edema and mass effect. New enhancing lesion at the right temporal occipital junction as above, presumed metastasis.  On 10/11/2024, He was treated for this with radiosurgery (20 Gy to the 56% isodose line).  The patient had an interval MRI that was done on 12/09/2024.  This was ordered by his oncologist because he was having headaches.  He has recently had a new MRI that shows a new punctate area of enhancement concerning for tumor.  The patient was seen and evaluated.  After the risks, benefits, and alternatives were discussed.  Wished to proceed with radiosurgery     Procedure in Detail:  The patient was seen in the pretreatment area and the risks, benefits, and alternatives were discussed. The patient understood and wished to proceed. Dr. Campos, Dr. Henson and Dr. Johnson planned the radiosurgery based on a recent MRI that had been performed.  A single target was identified:  C3 RT temporal.  The tumors were targeted using Gamma Plan (Lightning planned).  The treatment  consisted of 2 shots using a single 4 mm collimator and a single 8 mm collimator.  Dr. Henson prescribed a dose of 22 Gy to the 80% isodose line.  The patient had a mask that was made in used to immobilize him during the treatment.  The patient was immobilized using this mask.  The cone beam CT and plan were coregistered.  The patient was placed in the unit and the shots were delivered sequentially.  The patient was placed in the unit and the shots were delivered sequentially.  The total beam on-time was 12.1 minutes.  The patient tolerated the treatment well.      COMPLICATIONS:  None.  ESTIMATED BLOOD LOSS:  None.  DISPOSITION:  The patient is to followup in the [facility]idisciplinary [facility] clinic in 2 months with a repeat MRI.           Dr. Steven Campos MD, Ph.D.

## 2025-02-05 ENCOUNTER — PATIENT MESSAGE (OUTPATIENT)
Dept: RADIATION ONCOLOGY | Facility: CLINIC | Age: 80
End: 2025-02-05
Payer: MEDICARE

## 2025-02-05 ENCOUNTER — PATIENT MESSAGE (OUTPATIENT)
Dept: ADMINISTRATIVE | Facility: OTHER | Age: 80
End: 2025-02-05
Payer: MEDICARE

## 2025-02-06 ENCOUNTER — TELEPHONE (OUTPATIENT)
Dept: RADIATION THERAPY | Facility: HOSPITAL | Age: 80
End: 2025-02-06
Payer: MEDICARE

## 2025-02-06 DIAGNOSIS — C79.31 METASTASIS TO BRAIN: Primary | ICD-10-CM

## 2025-02-06 RX ORDER — DEXAMETHASONE 4 MG/1
4 TABLET ORAL 2 TIMES DAILY
Qty: 8 TABLET | Refills: 0 | Status: SHIPPED | OUTPATIENT
Start: 2025-02-06

## 2025-02-06 NOTE — TELEPHONE ENCOUNTER
Spoke to Jordin Allen Jr. Regarding medication sent to pharmacy and how to take, verbalizes understanding and instructed to call me if roberson has any other issues

## 2025-02-07 ENCOUNTER — HOSPITAL ENCOUNTER (INPATIENT)
Facility: HOSPITAL | Age: 80
LOS: 1 days | Discharge: HOME | DRG: 092 | End: 2025-02-07
Attending: STUDENT IN AN ORGANIZED HEALTH CARE EDUCATION/TRAINING PROGRAM | Admitting: STUDENT IN AN ORGANIZED HEALTH CARE EDUCATION/TRAINING PROGRAM
Payer: MEDICARE

## 2025-02-07 ENCOUNTER — TELEPHONE (OUTPATIENT)
Dept: HEMATOLOGY/ONCOLOGY | Facility: CLINIC | Age: 80
End: 2025-02-07
Payer: MEDICARE

## 2025-02-07 ENCOUNTER — HOSPITAL ENCOUNTER (INPATIENT)
Facility: HOSPITAL | Age: 80
LOS: 5 days | Discharge: NURSING, REHAB OR LONG-TERM CARE | DRG: 124 | End: 2025-02-12
Attending: STUDENT IN AN ORGANIZED HEALTH CARE EDUCATION/TRAINING PROGRAM | Admitting: STUDENT IN AN ORGANIZED HEALTH CARE EDUCATION/TRAINING PROGRAM
Payer: MEDICARE

## 2025-02-07 VITALS
SYSTOLIC BLOOD PRESSURE: 144 MMHG | DIASTOLIC BLOOD PRESSURE: 78 MMHG | TEMPERATURE: 98 F | HEART RATE: 90 BPM | OXYGEN SATURATION: 99 % | RESPIRATION RATE: 18 BRPM

## 2025-02-07 DIAGNOSIS — G47.33 OSA (OBSTRUCTIVE SLEEP APNEA): ICD-10-CM

## 2025-02-07 DIAGNOSIS — R07.9 CHEST PAIN: ICD-10-CM

## 2025-02-07 DIAGNOSIS — R29.818 ACUTE FOCAL NEUROLOGICAL DEFICIT: ICD-10-CM

## 2025-02-07 DIAGNOSIS — H53.9 UNSPECIFIED VISUAL DISTURBANCE: ICD-10-CM

## 2025-02-07 DIAGNOSIS — H53.139: Primary | ICD-10-CM

## 2025-02-07 DIAGNOSIS — G45.9 TIA (TRANSIENT ISCHEMIC ATTACK): ICD-10-CM

## 2025-02-07 DIAGNOSIS — R29.6 UNWITNESSED FALL: ICD-10-CM

## 2025-02-07 DIAGNOSIS — C79.31 METASTASIS TO BRAIN: ICD-10-CM

## 2025-02-07 DIAGNOSIS — C34.92 ADENOCARCINOMA, LUNG, LEFT: ICD-10-CM

## 2025-02-07 PROBLEM — H51.8 GAZE PREFERENCE: Status: ACTIVE | Noted: 2025-02-07

## 2025-02-07 LAB
ALBUMIN SERPL BCP-MCNC: 3.4 G/DL (ref 3.5–5.2)
ALBUMIN SERPL BCP-MCNC: 3.4 G/DL (ref 3.5–5.2)
ALP SERPL-CCNC: 89 U/L (ref 40–150)
ALP SERPL-CCNC: 98 U/L (ref 40–150)
ALT SERPL W/O P-5'-P-CCNC: 13 U/L (ref 10–44)
ALT SERPL W/O P-5'-P-CCNC: 14 U/L (ref 10–44)
AMPHET+METHAMPHET UR QL: NEGATIVE
ANION GAP SERPL CALC-SCNC: 11 MMOL/L (ref 8–16)
ANION GAP SERPL CALC-SCNC: 12 MMOL/L (ref 8–16)
AST SERPL-CCNC: 15 U/L (ref 10–40)
AST SERPL-CCNC: 16 U/L (ref 10–40)
BARBITURATES UR QL SCN>200 NG/ML: NEGATIVE
BASOPHILS # BLD AUTO: 0.02 K/UL (ref 0–0.2)
BASOPHILS # BLD AUTO: 0.04 K/UL (ref 0–0.2)
BASOPHILS NFR BLD: 0.4 % (ref 0–1.9)
BASOPHILS NFR BLD: 0.6 % (ref 0–1.9)
BENZODIAZ UR QL SCN>200 NG/ML: NEGATIVE
BILIRUB SERPL-MCNC: 0.8 MG/DL (ref 0.1–1)
BILIRUB SERPL-MCNC: 0.8 MG/DL (ref 0.1–1)
BILIRUB UR QL STRIP: NEGATIVE
BILIRUB UR QL STRIP: NEGATIVE
BUN SERPL-MCNC: 16 MG/DL (ref 8–23)
BUN SERPL-MCNC: 22 MG/DL (ref 8–23)
BZE UR QL SCN: NEGATIVE
CALCIUM SERPL-MCNC: 9.3 MG/DL (ref 8.7–10.5)
CALCIUM SERPL-MCNC: 9.4 MG/DL (ref 8.7–10.5)
CANNABINOIDS UR QL SCN: NEGATIVE
CHLORIDE SERPL-SCNC: 104 MMOL/L (ref 95–110)
CHLORIDE SERPL-SCNC: 104 MMOL/L (ref 95–110)
CHOLEST SERPL-MCNC: 101 MG/DL (ref 120–199)
CHOLEST SERPL-MCNC: 101 MG/DL (ref 120–199)
CHOLEST/HDLC SERPL: 2.9 {RATIO} (ref 2–5)
CHOLEST/HDLC SERPL: 3.1 {RATIO} (ref 2–5)
CLARITY UR REFRACT.AUTO: CLEAR
CLARITY UR REFRACT.AUTO: CLEAR
CO2 SERPL-SCNC: 22 MMOL/L (ref 23–29)
CO2 SERPL-SCNC: 26 MMOL/L (ref 23–29)
COLOR UR AUTO: YELLOW
COLOR UR AUTO: YELLOW
CREAT SERPL-MCNC: 1 MG/DL (ref 0.5–1.4)
CREAT SERPL-MCNC: 1.1 MG/DL (ref 0.5–1.4)
CREAT UR-MCNC: 106 MG/DL (ref 23–375)
DIFFERENTIAL METHOD BLD: ABNORMAL
DIFFERENTIAL METHOD BLD: ABNORMAL
EOSINOPHIL # BLD AUTO: 0 K/UL (ref 0–0.5)
EOSINOPHIL # BLD AUTO: 0.1 K/UL (ref 0–0.5)
EOSINOPHIL NFR BLD: 0 % (ref 0–8)
EOSINOPHIL NFR BLD: 1.4 % (ref 0–8)
ERYTHROCYTE [DISTWIDTH] IN BLOOD BY AUTOMATED COUNT: 13.8 % (ref 11.5–14.5)
ERYTHROCYTE [DISTWIDTH] IN BLOOD BY AUTOMATED COUNT: 14.1 % (ref 11.5–14.5)
EST. GFR  (NO RACE VARIABLE): >60 ML/MIN/1.73 M^2
EST. GFR  (NO RACE VARIABLE): >60 ML/MIN/1.73 M^2
GLUCOSE SERPL-MCNC: 126 MG/DL (ref 70–110)
GLUCOSE SERPL-MCNC: 135 MG/DL (ref 70–110)
GLUCOSE UR QL STRIP: NEGATIVE
GLUCOSE UR QL STRIP: NEGATIVE
HCT VFR BLD AUTO: 38.6 % (ref 40–54)
HCT VFR BLD AUTO: 39.4 % (ref 40–54)
HCV AB SERPL QL IA: NORMAL
HDLC SERPL-MCNC: 33 MG/DL (ref 40–75)
HDLC SERPL-MCNC: 35 MG/DL (ref 40–75)
HDLC SERPL: 32.7 % (ref 20–50)
HDLC SERPL: 34.7 % (ref 20–50)
HGB BLD-MCNC: 12.4 G/DL (ref 14–18)
HGB BLD-MCNC: 12.7 G/DL (ref 14–18)
HGB UR QL STRIP: NEGATIVE
HGB UR QL STRIP: NEGATIVE
HIV 1+2 AB+HIV1 P24 AG SERPL QL IA: NORMAL
IMM GRANULOCYTES # BLD AUTO: 0.06 K/UL (ref 0–0.04)
IMM GRANULOCYTES # BLD AUTO: 0.06 K/UL (ref 0–0.04)
IMM GRANULOCYTES NFR BLD AUTO: 0.9 % (ref 0–0.5)
IMM GRANULOCYTES NFR BLD AUTO: 1.3 % (ref 0–0.5)
INR PPP: 1 (ref 0.8–1.2)
INR PPP: 1 (ref 0.8–1.2)
KETONES UR QL STRIP: ABNORMAL
KETONES UR QL STRIP: ABNORMAL
LDLC SERPL CALC-MCNC: 48.2 MG/DL (ref 63–159)
LDLC SERPL CALC-MCNC: 53.2 MG/DL (ref 63–159)
LEUKOCYTE ESTERASE UR QL STRIP: NEGATIVE
LEUKOCYTE ESTERASE UR QL STRIP: NEGATIVE
LYMPHOCYTES # BLD AUTO: 0.2 K/UL (ref 1–4.8)
LYMPHOCYTES # BLD AUTO: 0.6 K/UL (ref 1–4.8)
LYMPHOCYTES NFR BLD: 3.5 % (ref 18–48)
LYMPHOCYTES NFR BLD: 8.5 % (ref 18–48)
MCH RBC QN AUTO: 27.9 PG (ref 27–31)
MCH RBC QN AUTO: 28.1 PG (ref 27–31)
MCHC RBC AUTO-ENTMCNC: 32.1 G/DL (ref 32–36)
MCHC RBC AUTO-ENTMCNC: 32.2 G/DL (ref 32–36)
MCV RBC AUTO: 87 FL (ref 82–98)
MCV RBC AUTO: 87 FL (ref 82–98)
METHADONE UR QL SCN>300 NG/ML: NEGATIVE
MONOCYTES # BLD AUTO: 0.1 K/UL (ref 0.3–1)
MONOCYTES # BLD AUTO: 0.6 K/UL (ref 0.3–1)
MONOCYTES NFR BLD: 1.3 % (ref 4–15)
MONOCYTES NFR BLD: 9.1 % (ref 4–15)
NEUTROPHILS # BLD AUTO: 4.2 K/UL (ref 1.8–7.7)
NEUTROPHILS # BLD AUTO: 5.1 K/UL (ref 1.8–7.7)
NEUTROPHILS NFR BLD: 79.5 % (ref 38–73)
NEUTROPHILS NFR BLD: 93.5 % (ref 38–73)
NITRITE UR QL STRIP: NEGATIVE
NITRITE UR QL STRIP: NEGATIVE
NONHDLC SERPL-MCNC: 66 MG/DL
NONHDLC SERPL-MCNC: 68 MG/DL
NRBC BLD-RTO: 0 /100 WBC
NRBC BLD-RTO: 0 /100 WBC
OHS QRS DURATION: 88 MS
OHS QTC CALCULATION: 469 MS
OPIATES UR QL SCN: NEGATIVE
PCP UR QL SCN>25 NG/ML: NEGATIVE
PH UR STRIP: 6 [PH] (ref 5–8)
PH UR STRIP: 6 [PH] (ref 5–8)
PLATELET # BLD AUTO: 172 K/UL (ref 150–450)
PLATELET # BLD AUTO: 188 K/UL (ref 150–450)
PMV BLD AUTO: 10.2 FL (ref 9.2–12.9)
PMV BLD AUTO: 10.2 FL (ref 9.2–12.9)
POC PTINR: 1 (ref 0.9–1.2)
POC PTINR: 1.1 (ref 0.9–1.2)
POC PTWBT: 10.3 SEC (ref 9.7–14.3)
POC PTWBT: 10.9 SEC (ref 9.7–14.3)
POCT GLUCOSE: 142 MG/DL (ref 70–110)
POTASSIUM SERPL-SCNC: 3.4 MMOL/L (ref 3.5–5.1)
POTASSIUM SERPL-SCNC: 3.8 MMOL/L (ref 3.5–5.1)
PROT SERPL-MCNC: 6.7 G/DL (ref 6–8.4)
PROT SERPL-MCNC: 6.8 G/DL (ref 6–8.4)
PROT UR QL STRIP: ABNORMAL
PROT UR QL STRIP: ABNORMAL
PROTHROMBIN TIME: 10.9 SEC (ref 9–12.5)
PROTHROMBIN TIME: 11.4 SEC (ref 9–12.5)
RBC # BLD AUTO: 4.45 M/UL (ref 4.6–6.2)
RBC # BLD AUTO: 4.52 M/UL (ref 4.6–6.2)
SAMPLE: NORMAL
SODIUM SERPL-SCNC: 138 MMOL/L (ref 136–145)
SODIUM SERPL-SCNC: 141 MMOL/L (ref 136–145)
SP GR UR STRIP: >1.03 (ref 1–1.03)
SP GR UR STRIP: >1.03 (ref 1–1.03)
T4 FREE SERPL-MCNC: 1.19 NG/DL (ref 0.71–1.51)
TOXICOLOGY INFORMATION: NORMAL
TRIGL SERPL-MCNC: 64 MG/DL (ref 30–150)
TRIGL SERPL-MCNC: 99 MG/DL (ref 30–150)
TSH SERPL DL<=0.005 MIU/L-ACNC: 0.32 UIU/ML (ref 0.4–4)
TSH SERPL DL<=0.005 MIU/L-ACNC: 1.15 UIU/ML (ref 0.4–4)
URN SPEC COLLECT METH UR: ABNORMAL
URN SPEC COLLECT METH UR: ABNORMAL
WBC # BLD AUTO: 4.53 K/UL (ref 3.9–12.7)
WBC # BLD AUTO: 6.46 K/UL (ref 3.9–12.7)

## 2025-02-07 PROCEDURE — A9585 GADOBUTROL INJECTION: HCPCS | Performed by: STUDENT IN AN ORGANIZED HEALTH CARE EDUCATION/TRAINING PROGRAM

## 2025-02-07 PROCEDURE — 85610 PROTHROMBIN TIME: CPT

## 2025-02-07 PROCEDURE — 12000002 HC ACUTE/MED SURGE SEMI-PRIVATE ROOM

## 2025-02-07 PROCEDURE — 25500020 PHARM REV CODE 255: Performed by: STUDENT IN AN ORGANIZED HEALTH CARE EDUCATION/TRAINING PROGRAM

## 2025-02-07 PROCEDURE — 94761 N-INVAS EAR/PLS OXIMETRY MLT: CPT

## 2025-02-07 PROCEDURE — 81003 URINALYSIS AUTO W/O SCOPE: CPT | Mod: 59 | Performed by: STUDENT IN AN ORGANIZED HEALTH CARE EDUCATION/TRAINING PROGRAM

## 2025-02-07 PROCEDURE — 85610 PROTHROMBIN TIME: CPT | Performed by: STUDENT IN AN ORGANIZED HEALTH CARE EDUCATION/TRAINING PROGRAM

## 2025-02-07 PROCEDURE — 82565 ASSAY OF CREATININE: CPT

## 2025-02-07 PROCEDURE — 82962 GLUCOSE BLOOD TEST: CPT

## 2025-02-07 PROCEDURE — 85610 PROTHROMBIN TIME: CPT | Mod: 91 | Performed by: STUDENT IN AN ORGANIZED HEALTH CARE EDUCATION/TRAINING PROGRAM

## 2025-02-07 PROCEDURE — 80061 LIPID PANEL: CPT | Mod: 91 | Performed by: STUDENT IN AN ORGANIZED HEALTH CARE EDUCATION/TRAINING PROGRAM

## 2025-02-07 PROCEDURE — 63600175 PHARM REV CODE 636 W HCPCS: Performed by: STUDENT IN AN ORGANIZED HEALTH CARE EDUCATION/TRAINING PROGRAM

## 2025-02-07 PROCEDURE — 85025 COMPLETE CBC W/AUTO DIFF WBC: CPT | Performed by: STUDENT IN AN ORGANIZED HEALTH CARE EDUCATION/TRAINING PROGRAM

## 2025-02-07 PROCEDURE — 84439 ASSAY OF FREE THYROXINE: CPT | Performed by: STUDENT IN AN ORGANIZED HEALTH CARE EDUCATION/TRAINING PROGRAM

## 2025-02-07 PROCEDURE — 80061 LIPID PANEL: CPT | Performed by: STUDENT IN AN ORGANIZED HEALTH CARE EDUCATION/TRAINING PROGRAM

## 2025-02-07 PROCEDURE — 92610 EVALUATE SWALLOWING FUNCTION: CPT

## 2025-02-07 PROCEDURE — 25000003 PHARM REV CODE 250: Performed by: STUDENT IN AN ORGANIZED HEALTH CARE EDUCATION/TRAINING PROGRAM

## 2025-02-07 PROCEDURE — 63600175 PHARM REV CODE 636 W HCPCS: Performed by: INTERNAL MEDICINE

## 2025-02-07 PROCEDURE — 99285 EMERGENCY DEPT VISIT HI MDM: CPT | Mod: 25,27

## 2025-02-07 PROCEDURE — 27000221 HC OXYGEN, UP TO 24 HOURS

## 2025-02-07 PROCEDURE — 80053 COMPREHEN METABOLIC PANEL: CPT | Mod: 91 | Performed by: STUDENT IN AN ORGANIZED HEALTH CARE EDUCATION/TRAINING PROGRAM

## 2025-02-07 PROCEDURE — 93005 ELECTROCARDIOGRAM TRACING: CPT

## 2025-02-07 PROCEDURE — 99285 EMERGENCY DEPT VISIT HI MDM: CPT | Mod: 25

## 2025-02-07 PROCEDURE — 81003 URINALYSIS AUTO W/O SCOPE: CPT | Mod: 91,59 | Performed by: PHYSICIAN ASSISTANT

## 2025-02-07 PROCEDURE — 99900035 HC TECH TIME PER 15 MIN (STAT)

## 2025-02-07 PROCEDURE — 25000003 PHARM REV CODE 250: Performed by: INTERNAL MEDICINE

## 2025-02-07 PROCEDURE — 25000242 PHARM REV CODE 250 ALT 637 W/ HCPCS: Performed by: INTERNAL MEDICINE

## 2025-02-07 PROCEDURE — 97535 SELF CARE MNGMENT TRAINING: CPT

## 2025-02-07 PROCEDURE — 84443 ASSAY THYROID STIM HORMONE: CPT | Performed by: STUDENT IN AN ORGANIZED HEALTH CARE EDUCATION/TRAINING PROGRAM

## 2025-02-07 PROCEDURE — 84443 ASSAY THYROID STIM HORMONE: CPT | Mod: 91 | Performed by: STUDENT IN AN ORGANIZED HEALTH CARE EDUCATION/TRAINING PROGRAM

## 2025-02-07 PROCEDURE — 80053 COMPREHEN METABOLIC PANEL: CPT | Performed by: STUDENT IN AN ORGANIZED HEALTH CARE EDUCATION/TRAINING PROGRAM

## 2025-02-07 PROCEDURE — 86803 HEPATITIS C AB TEST: CPT | Performed by: STUDENT IN AN ORGANIZED HEALTH CARE EDUCATION/TRAINING PROGRAM

## 2025-02-07 PROCEDURE — 96374 THER/PROPH/DIAG INJ IV PUSH: CPT

## 2025-02-07 PROCEDURE — 93010 ELECTROCARDIOGRAM REPORT: CPT | Mod: ,,, | Performed by: INTERNAL MEDICINE

## 2025-02-07 PROCEDURE — 80307 DRUG TEST PRSMV CHEM ANLYZR: CPT | Performed by: STUDENT IN AN ORGANIZED HEALTH CARE EDUCATION/TRAINING PROGRAM

## 2025-02-07 PROCEDURE — 87389 HIV-1 AG W/HIV-1&-2 AB AG IA: CPT | Performed by: STUDENT IN AN ORGANIZED HEALTH CARE EDUCATION/TRAINING PROGRAM

## 2025-02-07 PROCEDURE — 85025 COMPLETE CBC W/AUTO DIFF WBC: CPT | Mod: 91 | Performed by: STUDENT IN AN ORGANIZED HEALTH CARE EDUCATION/TRAINING PROGRAM

## 2025-02-07 RX ORDER — ENOXAPARIN SODIUM 100 MG/ML
40 INJECTION SUBCUTANEOUS EVERY 24 HOURS
Status: DISCONTINUED | OUTPATIENT
Start: 2025-02-07 | End: 2025-02-07 | Stop reason: HOSPADM

## 2025-02-07 RX ORDER — PANTOPRAZOLE SODIUM 40 MG/1
40 TABLET, DELAYED RELEASE ORAL DAILY
Status: DISCONTINUED | OUTPATIENT
Start: 2025-02-07 | End: 2025-02-07 | Stop reason: HOSPADM

## 2025-02-07 RX ORDER — FLUTICASONE PROPIONATE 50 MCG
2 SPRAY, SUSPENSION (ML) NASAL DAILY
Status: DISCONTINUED | OUTPATIENT
Start: 2025-02-07 | End: 2025-02-07 | Stop reason: HOSPADM

## 2025-02-07 RX ORDER — GADOBUTROL 604.72 MG/ML
9 INJECTION INTRAVENOUS
Status: COMPLETED | OUTPATIENT
Start: 2025-02-07 | End: 2025-02-07

## 2025-02-07 RX ORDER — NALOXONE HCL 0.4 MG/ML
0.02 VIAL (ML) INJECTION
Status: DISCONTINUED | OUTPATIENT
Start: 2025-02-07 | End: 2025-02-12 | Stop reason: HOSPADM

## 2025-02-07 RX ORDER — TRAZODONE HYDROCHLORIDE 50 MG/1
50 TABLET ORAL NIGHTLY
Status: DISCONTINUED | OUTPATIENT
Start: 2025-02-07 | End: 2025-02-07 | Stop reason: HOSPADM

## 2025-02-07 RX ORDER — FLUTICASONE PROPIONATE 50 MCG
2 SPRAY, SUSPENSION (ML) NASAL DAILY
Status: DISCONTINUED | OUTPATIENT
Start: 2025-02-08 | End: 2025-02-12 | Stop reason: HOSPADM

## 2025-02-07 RX ORDER — LOSARTAN POTASSIUM 50 MG/1
100 TABLET ORAL DAILY
Status: DISCONTINUED | OUTPATIENT
Start: 2025-02-07 | End: 2025-02-07 | Stop reason: HOSPADM

## 2025-02-07 RX ORDER — FUROSEMIDE 20 MG/1
20 TABLET ORAL DAILY
Status: DISCONTINUED | OUTPATIENT
Start: 2025-02-08 | End: 2025-02-12 | Stop reason: HOSPADM

## 2025-02-07 RX ORDER — ASPIRIN 81 MG/1
81 TABLET ORAL DAILY
Status: DISCONTINUED | OUTPATIENT
Start: 2025-02-07 | End: 2025-02-07 | Stop reason: HOSPADM

## 2025-02-07 RX ORDER — ACETAMINOPHEN 325 MG/1
650 TABLET ORAL EVERY 6 HOURS PRN
Status: DISCONTINUED | OUTPATIENT
Start: 2025-02-07 | End: 2025-02-12 | Stop reason: HOSPADM

## 2025-02-07 RX ORDER — SODIUM CHLORIDE 0.9 % (FLUSH) 0.9 %
10 SYRINGE (ML) INJECTION EVERY 12 HOURS PRN
Status: DISCONTINUED | OUTPATIENT
Start: 2025-02-07 | End: 2025-02-12 | Stop reason: HOSPADM

## 2025-02-07 RX ORDER — GLUCAGON 1 MG
1 KIT INJECTION
Status: DISCONTINUED | OUTPATIENT
Start: 2025-02-07 | End: 2025-02-12 | Stop reason: HOSPADM

## 2025-02-07 RX ORDER — ALBUTEROL SULFATE 0.63 MG/3ML
0.63 SOLUTION RESPIRATORY (INHALATION) EVERY 6 HOURS PRN
Status: DISCONTINUED | OUTPATIENT
Start: 2025-02-07 | End: 2025-02-07 | Stop reason: HOSPADM

## 2025-02-07 RX ORDER — NITROGLYCERIN 0.4 MG/1
0.4 TABLET SUBLINGUAL EVERY 5 MIN PRN
Status: DISCONTINUED | OUTPATIENT
Start: 2025-02-07 | End: 2025-02-12 | Stop reason: HOSPADM

## 2025-02-07 RX ORDER — AMITRIPTYLINE HYDROCHLORIDE 25 MG/1
25 TABLET, FILM COATED ORAL NIGHTLY
Status: DISCONTINUED | OUTPATIENT
Start: 2025-02-07 | End: 2025-02-07 | Stop reason: HOSPADM

## 2025-02-07 RX ORDER — SODIUM CHLORIDE 0.9 % (FLUSH) 0.9 %
10 SYRINGE (ML) INJECTION EVERY 12 HOURS PRN
Status: DISCONTINUED | OUTPATIENT
Start: 2025-02-07 | End: 2025-02-07 | Stop reason: HOSPADM

## 2025-02-07 RX ORDER — IBUPROFEN 200 MG
24 TABLET ORAL
Status: DISCONTINUED | OUTPATIENT
Start: 2025-02-07 | End: 2025-02-07 | Stop reason: HOSPADM

## 2025-02-07 RX ORDER — ALBUTEROL SULFATE 90 UG/1
2 INHALANT RESPIRATORY (INHALATION) EVERY 4 HOURS PRN
Status: DISCONTINUED | OUTPATIENT
Start: 2025-02-07 | End: 2025-02-07 | Stop reason: HOSPADM

## 2025-02-07 RX ORDER — IBUPROFEN 200 MG
16 TABLET ORAL
Status: DISCONTINUED | OUTPATIENT
Start: 2025-02-07 | End: 2025-02-12 | Stop reason: HOSPADM

## 2025-02-07 RX ORDER — PANTOPRAZOLE SODIUM 40 MG/1
40 TABLET, DELAYED RELEASE ORAL DAILY
Status: DISCONTINUED | OUTPATIENT
Start: 2025-02-08 | End: 2025-02-12 | Stop reason: HOSPADM

## 2025-02-07 RX ORDER — ALBUTEROL SULFATE 90 UG/1
2 INHALANT RESPIRATORY (INHALATION) EVERY 4 HOURS PRN
Status: DISCONTINUED | OUTPATIENT
Start: 2025-02-07 | End: 2025-02-12 | Stop reason: HOSPADM

## 2025-02-07 RX ORDER — ATORVASTATIN CALCIUM 40 MG/1
80 TABLET, FILM COATED ORAL DAILY
Status: DISCONTINUED | OUTPATIENT
Start: 2025-02-07 | End: 2025-02-07 | Stop reason: HOSPADM

## 2025-02-07 RX ORDER — NITROGLYCERIN 0.4 MG/1
0.4 TABLET SUBLINGUAL EVERY 5 MIN PRN
Status: DISCONTINUED | OUTPATIENT
Start: 2025-02-07 | End: 2025-02-07 | Stop reason: HOSPADM

## 2025-02-07 RX ORDER — ROFLUMILAST 500 UG/1
1 TABLET ORAL DAILY
Status: DISCONTINUED | OUTPATIENT
Start: 2025-02-08 | End: 2025-02-12 | Stop reason: HOSPADM

## 2025-02-07 RX ORDER — FUROSEMIDE 20 MG/1
20 TABLET ORAL DAILY
Status: DISCONTINUED | OUTPATIENT
Start: 2025-02-07 | End: 2025-02-07 | Stop reason: HOSPADM

## 2025-02-07 RX ORDER — ASPIRIN 81 MG/1
81 TABLET ORAL DAILY
Status: DISCONTINUED | OUTPATIENT
Start: 2025-02-08 | End: 2025-02-12 | Stop reason: HOSPADM

## 2025-02-07 RX ORDER — POTASSIUM CHLORIDE 20 MEQ/1
40 TABLET, EXTENDED RELEASE ORAL
Status: COMPLETED | OUTPATIENT
Start: 2025-02-07 | End: 2025-02-07

## 2025-02-07 RX ORDER — IBUPROFEN 400 MG/1
400 TABLET ORAL
Status: COMPLETED | OUTPATIENT
Start: 2025-02-07 | End: 2025-02-07

## 2025-02-07 RX ORDER — ENOXAPARIN SODIUM 100 MG/ML
40 INJECTION SUBCUTANEOUS EVERY 24 HOURS
Status: DISCONTINUED | OUTPATIENT
Start: 2025-02-08 | End: 2025-02-10

## 2025-02-07 RX ORDER — NALOXONE HCL 0.4 MG/ML
0.02 VIAL (ML) INJECTION
Status: DISCONTINUED | OUTPATIENT
Start: 2025-02-07 | End: 2025-02-07 | Stop reason: HOSPADM

## 2025-02-07 RX ORDER — ONDANSETRON HYDROCHLORIDE 2 MG/ML
4 INJECTION, SOLUTION INTRAVENOUS
Status: COMPLETED | OUTPATIENT
Start: 2025-02-07 | End: 2025-02-07

## 2025-02-07 RX ORDER — GLUCAGON 1 MG
1 KIT INJECTION
Status: DISCONTINUED | OUTPATIENT
Start: 2025-02-07 | End: 2025-02-07 | Stop reason: HOSPADM

## 2025-02-07 RX ORDER — IBUPROFEN 200 MG
16 TABLET ORAL
Status: DISCONTINUED | OUTPATIENT
Start: 2025-02-07 | End: 2025-02-07 | Stop reason: HOSPADM

## 2025-02-07 RX ORDER — ATORVASTATIN CALCIUM 40 MG/1
80 TABLET, FILM COATED ORAL DAILY
Status: DISCONTINUED | OUTPATIENT
Start: 2025-02-08 | End: 2025-02-12 | Stop reason: HOSPADM

## 2025-02-07 RX ORDER — TRAZODONE HYDROCHLORIDE 50 MG/1
50 TABLET ORAL NIGHTLY
Status: DISCONTINUED | OUTPATIENT
Start: 2025-02-07 | End: 2025-02-12 | Stop reason: HOSPADM

## 2025-02-07 RX ORDER — AMITRIPTYLINE HYDROCHLORIDE 25 MG/1
25 TABLET, FILM COATED ORAL NIGHTLY
Status: DISCONTINUED | OUTPATIENT
Start: 2025-02-07 | End: 2025-02-12 | Stop reason: HOSPADM

## 2025-02-07 RX ORDER — ACETAMINOPHEN 500 MG
1000 TABLET ORAL
Status: COMPLETED | OUTPATIENT
Start: 2025-02-07 | End: 2025-02-07

## 2025-02-07 RX ORDER — LOSARTAN POTASSIUM 50 MG/1
100 TABLET ORAL DAILY
Status: DISCONTINUED | OUTPATIENT
Start: 2025-02-08 | End: 2025-02-12 | Stop reason: HOSPADM

## 2025-02-07 RX ORDER — DEXAMETHASONE 4 MG/1
4 TABLET ORAL 2 TIMES DAILY
Status: DISCONTINUED | OUTPATIENT
Start: 2025-02-07 | End: 2025-02-12 | Stop reason: HOSPADM

## 2025-02-07 RX ORDER — DEXAMETHASONE 4 MG/1
4 TABLET ORAL 2 TIMES DAILY
Status: DISCONTINUED | OUTPATIENT
Start: 2025-02-07 | End: 2025-02-07 | Stop reason: HOSPADM

## 2025-02-07 RX ORDER — ACETAMINOPHEN 325 MG/1
650 TABLET ORAL EVERY 6 HOURS PRN
Status: DISCONTINUED | OUTPATIENT
Start: 2025-02-07 | End: 2025-02-07 | Stop reason: HOSPADM

## 2025-02-07 RX ORDER — IBUPROFEN 200 MG
24 TABLET ORAL
Status: DISCONTINUED | OUTPATIENT
Start: 2025-02-07 | End: 2025-02-12 | Stop reason: HOSPADM

## 2025-02-07 RX ORDER — ROFLUMILAST 500 UG/1
1 TABLET ORAL DAILY
Status: DISCONTINUED | OUTPATIENT
Start: 2025-02-07 | End: 2025-02-07 | Stop reason: HOSPADM

## 2025-02-07 RX ADMIN — DEXAMETHASONE 4 MG: 4 TABLET ORAL at 09:02

## 2025-02-07 RX ADMIN — LOSARTAN POTASSIUM 100 MG: 50 TABLET, FILM COATED ORAL at 09:02

## 2025-02-07 RX ADMIN — ONDANSETRON 4 MG: 2 INJECTION INTRAMUSCULAR; INTRAVENOUS at 12:02

## 2025-02-07 RX ADMIN — ASPIRIN 81 MG: 81 TABLET, COATED ORAL at 09:02

## 2025-02-07 RX ADMIN — POTASSIUM CHLORIDE 40 MEQ: 1500 TABLET, EXTENDED RELEASE ORAL at 09:02

## 2025-02-07 RX ADMIN — IOHEXOL 75 ML: 350 INJECTION, SOLUTION INTRAVENOUS at 12:02

## 2025-02-07 RX ADMIN — FUROSEMIDE 20 MG: 20 TABLET ORAL at 09:02

## 2025-02-07 RX ADMIN — POTASSIUM CHLORIDE 40 MEQ: 1500 TABLET, EXTENDED RELEASE ORAL at 11:02

## 2025-02-07 RX ADMIN — ROFLUMILAST 500 MCG: 500 TABLET ORAL at 09:02

## 2025-02-07 RX ADMIN — IBUPROFEN 400 MG: 400 TABLET, FILM COATED ORAL at 05:02

## 2025-02-07 RX ADMIN — ACETAMINOPHEN 1000 MG: 500 TABLET ORAL at 02:02

## 2025-02-07 RX ADMIN — GADOBUTROL 9 ML: 604.72 INJECTION INTRAVENOUS at 01:02

## 2025-02-07 RX ADMIN — ACETAMINOPHEN 650 MG: 325 TABLET ORAL at 12:02

## 2025-02-07 RX ADMIN — AMITRIPTYLINE HYDROCHLORIDE 25 MG: 25 TABLET, FILM COATED ORAL at 09:02

## 2025-02-07 RX ADMIN — PANTOPRAZOLE SODIUM 40 MG: 40 TABLET, DELAYED RELEASE ORAL at 09:02

## 2025-02-07 RX ADMIN — FLUTICASONE PROPIONATE 100 MCG: 50 SPRAY, METERED NASAL at 11:02

## 2025-02-07 RX ADMIN — TRAZODONE HYDROCHLORIDE 50 MG: 50 TABLET ORAL at 09:02

## 2025-02-07 NOTE — ED PROVIDER NOTES
Encounter Date: 2/7/2025       History     Chief Complaint   Patient presents with    Altered Mental Status     Found on the ground about 2330 after trying to go to the bathroom - found with L sided weakness and R gaze      Male with PMH of lung cancer with brain Mets presents with unwitnessed fall.  Patient is currently unable to provide a comprehensive history given altered mental status.    Per patient's family, he was last known normal around 11:30 p.m. they found him after he fell trying to get up and go to the bathroom.    Per EMS, patient initially had a gaze deficit to the right but was able to help transferred to the stretcher.  However, he then developed worsening left-sided weakness.  Glucose was around 140.    The history is provided by the patient and medical records.     Review of patient's allergies indicates:   Allergen Reactions    Brilinta [ticagrelor] Itching    Lisinopril      Other reaction(s): cough    Metoprolol succinate Rash     Past Medical History:   Diagnosis Date    Benign neoplasm of colon 10/01/2013    Bronchitis chronic     CAD (coronary artery disease) 10/31/2013    COPD (chronic obstructive pulmonary disease)     Emphysema of lung     Encounter for preventive health examination 3/21/2018    GERD (gastroesophageal reflux disease)     Hx of colonic polyps     Hypertension     NAFLD (nonalcoholic fatty liver disease) 03/27/2017    SHOLA on CPAP     RLS (restless legs syndrome)     Screen for colon cancer 08/30/2013     Past Surgical History:   Procedure Laterality Date    bilateral foot surgery  1993    CARDIAC CATHETERIZATION  2013    x1    CATARACT EXTRACTION, BILATERAL      COLON SURGERY  2013    Ace Mott MD    COLONOSCOPY N/A 3/21/2018    Procedure: COLONOSCOPY;  Surgeon: Terry Mott MD;  Location: [facility] ([facility]);  Service: Endoscopy;  Laterality: N/A;    COLONOSCOPY N/A 4/12/2019    Procedure: COLONOSCOPY;  Surgeon: John Mantilla MD;  Location: [facility] (4TH [facility]LR);   Service: Endoscopy;  Laterality: N/A;    COLONOSCOPY N/A 2022    Procedure: COLONOSCOPY;  Surgeon: MEDINA Farris MD;  Location: [facility] (4TH FLR);  Service: Endoscopy;  Laterality: N/A;  fully vacc-inst portal-tb    ENDOBRONCHIAL ULTRASOUND Bilateral 2024    Procedure: ENDOBRONCHIAL ULTRASOUND (EBUS);  Surgeon: Naveed Aguero MD;  Location: [facility] OR;  Service: Pulmonary;  Laterality: Bilateral;    scrotal abscess removal       Family History   Problem Relation Name Age of Onset    Heart disease Mother      Heart attack Mother      Hypertension Mother      No Known Problems Sister      No Known Problems Daughter 2     Asthma Neg Hx      Emphysema Neg Hx      Prostate cancer Neg Hx      Cirrhosis Neg Hx       Social History     Tobacco Use    Smoking status: Former     Current packs/day: 0.00     Average packs/day: 1 pack/day for 40.0 years (40.0 ttl pk-yrs)     Types: Cigarettes     Start date: 8/15/1972     Quit date: 8/15/2012     Years since quittin.4     Passive exposure: Never    Smokeless tobacco: Never   Substance Use Topics    Alcohol use: Yes     Alcohol/week: 3.0 standard drinks of alcohol     Types: 3 Cans of beer per week     Comment: maybe 2-3  beers weekly    Drug use: No     Review of Systems    Physical Exam     Initial Vitals [25 0011]   BP Pulse Resp Temp SpO2   (!) 168/98 90 16 97.4 °F (36.3 °C) 96 %      MAP       --         Physical Exam    Constitutional: Vital signs are normal. He appears well-developed and well-nourished. No distress.   HENT:   Head: Normocephalic.   No head lac or hematoma   Eyes: Conjunctivae are normal.   Cardiovascular:  Normal rate and intact distal pulses.           Pulmonary/Chest: No respiratory distress.   No increased work of breathing or tachypnea   Abdominal: Abdomen is soft. He exhibits no distension. There is no abdominal tenderness. There is no rebound and no guarding.     Neurological: He is alert. GCS eye subscore is 4. GCS verbal  subscore is 5. GCS motor subscore is 6.   NIHSS ([place] [facility] Stroke Scale)   1a  Level of consciousness: 0=alert; keenly responsive  1b. LOC questions:  2 = Answers neither question correctly  1c. LOC commands: 2=Answers neither task correctly  2.  Best Gaze: 2=forced deviation, or total gaze paresis not overcome by oculocephalic maneuver  3. Visual: 0=No visual loss  4. Facial Palsy: 0=Normal symmetric movement  5a. Motor left arm: 3=No effort against gravity, limb falls  5b.  Motor right arm: 0=No drift, limb holds 90 (or 45) degrees for full 10 seconds  6a. motor left leg: 3=No effort against gravity, limb falls  6b  Motor right le=No drift, limb holds 90 (or 45) degrees for full 10 seconds  7. Limb Ataxia: 2=Present in two limbs  8.  Sensory: 1=Mild to moderate sensory loss; patient feels pinprick is less sharp or is dull on the affected side; there is a loss of superficial pain with pinprick but patient is aware He is being touched  9. Best Language:  0=No aphasia, normal  10. Dysarthria: 0=Normal  11. Extinction and Inattention: 2=Profound izzy-inattention or izzy-inattention to more than one modality. Does not recognize own hand or orients only to one side of space       Total:   17   Skin: Skin is warm. Capillary refill takes less than 2 seconds.   Skin tear to left hand a MCP joint         ED Course   Procedures  Labs Reviewed   CBC W/ AUTO DIFFERENTIAL - Abnormal       Result Value    WBC 6.46      RBC 4.52 (*)     Hemoglobin 12.7 (*)     Hematocrit 39.4 (*)     MCV 87      MCH 28.1      MCHC 32.2      RDW 14.1      Platelets 172      MPV 10.2      Immature Granulocytes 0.9 (*)     Gran # (ANC) 5.1      Immature Grans (Abs) 0.06 (*)     Lymph # 0.6 (*)     Mono # 0.6      Eos # 0.1      Baso # 0.04      nRBC 0      Gran % 79.5 (*)     Lymph % 8.5 (*)     Mono % 9.1      Eosinophil % 1.4      Basophil % 0.6      Differential Method Automated      Narrative:     Release to  patient->Immediate   COMPREHENSIVE METABOLIC PANEL - Abnormal    Sodium 141      Potassium 3.4 (*)     Chloride 104      CO2 26      Glucose 126 (*)     BUN 22      Creatinine 1.0      Calcium 9.3      Total Protein 6.8      Albumin 3.4 (*)     Total Bilirubin 0.8      Alkaline Phosphatase 98      AST 16      ALT 13      eGFR >60.0      Anion Gap 11      Narrative:     Release to patient->Immediate   LIPID PANEL - Abnormal    Cholesterol 101 (*)     Triglycerides 99      HDL 33 (*)     LDL Cholesterol 48.2 (*)     HDL/Cholesterol Ratio 32.7      Total Cholesterol/HDL Ratio 3.1      Non-HDL Cholesterol 68      Narrative:     Release to patient->Immediate   URINALYSIS, REFLEX TO URINE CULTURE - Abnormal    Specimen UA Urine, Clean Catch      Color, UA Yellow      Appearance, UA Clear      pH, UA 6.0      Specific Gravity, UA >1.030 (*)     Protein, UA Trace (*)     Glucose, UA Negative      Ketones, UA Trace (*)     Bilirubin (UA) Negative      Occult Blood UA Negative      Nitrite, UA Negative      Leukocytes, UA Negative      Narrative:     Specimen Source->Urine   PROTIME-INR    Prothrombin Time 10.9      INR 1.0      Narrative:     Release to patient->Immediate   TSH    TSH 1.152      Narrative:     Release to patient->Immediate   DRUG SCREEN PANEL, URINE EMERGENCY    Benzodiazepines Negative      Methadone metabolites Negative      Cocaine (Metab.) Negative      Opiate Scrn, Ur Negative      Barbiturate Screen, Ur Negative      Amphetamine Screen, Ur Negative      THC Negative      Phencyclidine Negative      Creatinine, Urine 106.0      Toxicology Information SEE COMMENT      Narrative:     Specimen Source->Urine   HEPATITIS C ANTIBODY    Hepatitis C Ab Non-reactive      Narrative:     Release to patient->Immediate   HIV 1 / 2 ANTIBODY    HIV 1/2 Ag/Ab Non-reactive      Narrative:     Release to patient->Immediate   POCT GLUCOSE, HAND-HELD DEVICE   ISTAT CREATININE    POC Creatinine 1.1      Sample unknown      ISTAT PROCEDURE    POC PTWBT 10.9      POC PTINR 1.1      Sample unknown            Imaging Results              X-Ray Pelvis Routine AP (Final result)  Result time 02/07/25 05:36:22      Final result by Gavin Herman MD (02/07/25 05:36:22)                   Impression:      Sacrum and coccyx partially obscured.    Otherwise, as visualized radiographically, the hips and pelvic ring demonstrate degenerative changes but no acute osseous or articular abnormality.      Electronically signed by: Gavin Herman  Date:    02/07/2025  Time:    05:36               Narrative:    EXAMINATION:  XR PELVIS ROUTINE AP    CLINICAL HISTORY:  Repeated falls    TECHNIQUE:  AP view of the pelvis was performed.    COMPARISON:  AP pelvis radiograph 11/24/2010.    FINDINGS:  Scattered degenerative changes, including in both hips.    Partially visualized bilateral pyelograms.    The urinary bladder is also opacified by the excreted IV contrast.  This partially obscures the sacrum and coccyx.    Allowing for the above, no acute fracture deformity, traumatic diastasis, or dislocation is demonstrated in the visualized osseous pelvic ring or in either hip.    Bilateral iliofemoral atherosclerotic calcifications.    External leads, and metallic portions of a zipper, project over the visualized torso.                                       X-Ray Chest AP Portable (Final result)  Result time 02/07/25 05:31:25      Final result by Gavin Herman MD (02/07/25 05:31:25)                   Impression:      Probable COPD.    Thoracic aortic atherosclerosis.    Otherwise, no acute radiographic abnormality in the visualized chest.      Electronically signed by: Gavin Herman  Date:    02/07/2025  Time:    05:31               Narrative:    EXAMINATION:  XR CHEST AP PORTABLE    CLINICAL HISTORY:  Fall, screening exam;    TECHNIQUE:  Single frontal view of the chest was performed.    COMPARISON:  One-view chest x-ray 12/27/2024    FINDINGS:  Midline  thoracic trachea, allowing for leftward patient rotation.    Pre-existing atherosclerotic calcification in the thoracic aortic arch.    Nonenlarged cardiomediastinal silhouette.    Both lungs appear deeply expanded.    No consolidation, discrete pneumothorax, or blunting of either lateral costophrenic angle.    Scattered degenerative changes and osteopenia in the visualized skeleton, including within the suboptimally visualized spine.    No acute radiographic abnormality is apparent in the visualized upper abdomen.    External leads project upon the torso.                                        MRI Brain W WO Contrast (Final result)  Result time 02/07/25 05:21:24      Final result by Gavin Herman MD (02/07/25 05:21:24)                   Impression:      No acute infarct.  No intracranial hemorrhage.  No hydrocephalus.    Unchanged appearance of enhancing lesions, compatible with metastases, when compared to brain MRI 01/24/2025.    This report was flagged in Epic as abnormal.    Electronically signed by resident: Delvin Valdez  Date:    02/07/2025  Time:    03:46    Electronically signed by: Gavin Herman  Date:    02/07/2025  Time:    05:21               Narrative:    EXAMINATION:  MRI BRAIN W WO CONTRAST    CLINICAL HISTORY:  Neuro deficit, acute, stroke suspected;.    TECHNIQUE:  Multiplanar multisequence MR imaging of the brain was performed before and after the administration of 9 mL Gadavist  intravenous contrast.    COMPARISON:  CTA stroke 02/07/2025, MRI brain 01/24/2025    FINDINGS:  Intracranial compartment:    Ventricles and sulci are normal in size for age without evidence of hydrocephalus. No extra-axial blood or fluid collections.    Grossly stable appearance of enhancing lesion in the right posterior temporal occipital junction measuring 1.2 cm in greatest dimension, previously 1.2 cm.  Similar small amount of edema around the lesion.  Stable size enhancing lesion in the left thalamus  measuring approximately 1 cm, previously 1 cm by my measurements.  Punctate enhancing focus in the right posterolateral temporal lobe is less conspicuous/exam (series 20, image 104).  Few scattered foci of T2 FLAIR abnormality in the supratentorial white matter suggestive of chronic microvascular ischemic disease, unchanged.  No acute hemorrhage or infarct.    Normal vascular flow voids are preserved.    Skull/extracranial contents (limited evaluation): Bone marrow signal intensity is normal.                                        CTA STROKE MULTI-PHASE (Final result)  Result time 02/07/25 05:14:23      Final result by Gavin Herman MD (02/07/25 05:14:23)                   Impression:      Oval hypodensities in the right temporal occipital junction and left thalamus corresponding to previously seen metastatic lesions.    No evidence of major vascular distribution infarct or intracranial hemorrhage.  No hydrocephalus.    No large vessel occlusion or high-grade stenosis in the craniocervical cerebrovascular arterial circulation.    No depressed calvarial fracture.    The maxillofacial bones and cervical vertebrae demonstrate no acute fractures.    Faint patchy dependent opacities in left upper lobe are favored to represent atelectasis.  Pneumonia or other pathology not fully excluded.  Recommendations include clinical correlation, and follow-up outpatient chest CT within 3-6 months.    Small quantity of frothy intraluminal material in the visualized portion of the thoracic esophagus that is DISTAL to the aberrant right subclavian artery, suspicious for gastroesophageal reflux, esophageal dysmotility, or low-grade or partial mechanical or functional obstruction of the (non- visualized) distal esophagus.  Correlate clinically.  Consider follow-up outpatient EGD.    Additional observations as detailed in the body of the report    This report was flagged in Epic as abnormal.    Electronically signed by resident:  Delvin Valdez  Date:    02/07/2025  Time:    02:54    Electronically signed by: Gavin Herman  Date:    02/07/2025  Time:    05:14               Narrative:    EXAMINATION:  CTA STROKE MULTI-PHASE    CLINICAL HISTORY:  Neuro deficit, acute, stroke suspected    Additional history, from today's ED provider note: Unwitnessed ground-level fall, with a past medical history of lung cancer with brain metastases.    TECHNIQUE:  Axial CT images obtained throughout the region of the head before the administration of intravenous contrast.    CT angiogram was performed through the cervical and intracranial vasculature during the IV bolus administration of 75 mL of Omnipaque 350.  Two additional phases through the intracranial vasculature via multiphase technique.    Multiplanar MPR and MIP reformats were performed.    CT source data was analyzed using artificial intelligence software for detection of a large vessel occlusions (LVO) in order to enable computer assisted triage notification and aid clinical stroke decision making.    COMPARISON:  MRI brain 01/24/2025    FINDINGS:  Artifacts related to beam hardening and/or motion degrade portions of the scan.    The ventricles are normal in size without evidence of hydrocephalus.    There is focal hypodensity in the right temporal occipital junction corresponding to previously seen lesion on MRI brain.  Subtle hypodensity in the left thalamus corresponding to previously seen lesion.  No evidence of major vascular distribution infarct, or intracranial hemorrhage.  No significant mass effect or midline shift.    No extra-axial blood or fluid collections.    The cranium appears intact.  Mastoid air cells are clear.  There is mild mucosal thickening in the ethmoid air cells..    Soft tissues of the neck are unremarkable.  Emphysematous changes are seen in the lung apices.    Minimal dependent opacities in the left upper lobe are favored to represent atelectasis.  Pneumonia,  neoplasm, or other pathology not excluded.    Degenerative changes of the visual spine.    Allowing for the straightened cervical lordosis, the cervical vertebrae demonstrate normal alignment.    As visualized on these images obtained with CTA technique, the maxillofacial bones and cervical vertebrae demonstrate no acute fracture deformities.    No dislocation of either TMJ.    Symmetric size and symmetry of the ocular globes.  No scan evidence of retro bulbar soft tissue injury.      CTA:    The aortic arch demonstrates no significant stenosis at the major branch vessel origins.    Retroesophageal aberrant right subclavian artery, arising from a small to moderate-sized, oral diverticulum and causing some focal extrinsic compression of the thoracic esophagus.    There is minimal intraluminal gas within the esophagus above this level.    However, below the level of the aberrant right subclavian artery, the partially visualized esophagus contains some frothy intraluminal fluid.    The right common carotid artery is normal in caliber.  No significant stenosis at the carotid bifurcation.The right internal carotid artery is normal in caliber.    The left common carotid artery is normal in caliber.  No significant stenosis at the carotid bifurcation.The left internal carotid artery is normal in caliber.    The right vertebral artery is normal in caliber.  The left vertebral artery is dominant, normal caliber..    Basilar artery is within normal limits without focal abnormality.    Fetal origin right PCA.  Hypoplastic left PCOM.  The proximal anterior, middle, and posterior cerebral arteries are within normal limits.  No evidence of significant stenosis, focal occlusion, or intracranial aneurysm.                                       Medications   iohexoL (OMNIPAQUE 350) injection 75 mL (75 mLs Intravenous Given 2/7/25 0024)   ondansetron injection 4 mg (4 mg Intravenous Given 2/7/25 0045)   gadobutroL (GADAVIST) injection  9 mL (9 mLs Intravenous Given 2/7/25 0140)   acetaminophen tablet 1,000 mg (1,000 mg Oral Given 2/7/25 6267)   ibuprofen tablet 400 mg (400 mg Oral Given 2/7/25 6152)     Medical Decision Making  Differential diagnoses considered include CVA, ICH, subarachnoid, UTI, electrolyte abnormality, ZACH    Patient initially evaluated in EMS hallway immediately upon arrival.  Code stroke activated.  Discussed the case with vascular neurology. See ED course for additional attending MDM.     Amount and/or Complexity of Data Reviewed  External Data Reviewed: radiology.     Details: === Results for orders placed during the hospital encounter of 12/07/23 ===    Echo    - Interpretation Summary -  ·  Left Ventricle: The left ventricle is normal in size. Normal wall thickness. Normal wall motion. There is normal systolic function with a visually estimated ejection fraction of 60 - 65%. There is normal diastolic function.  ·  Right Ventricle: Normal right ventricular cavity size. Wall thickness is normal. Right ventricle wall motion  is normal. Systolic function is normal.  ·  Pulmonary Artery: The estimated pulmonary artery systolic pressure is 20 mmHg.  ·  IVC/SVC: Normal venous pressure at 3 mmHg.      Labs: ordered. Decision-making details documented in ED Course.  Radiology: ordered and independent interpretation performed. Decision-making details documented in ED Course.  ECG/medicine tests: ordered and independent interpretation performed. Decision-making details documented in ED Course.    Risk  OTC drugs.  Prescription drug management.  Decision regarding hospitalization.               ED Course as of 02/07/25 0600   Fri Feb 07, 2025   0030 CTA STROKE MULTI-PHASE  CT Head reviewed and independently interpreted by me is unremarkable without evidence of large-vessel infarct or intracranial hemorrhage   [BD]   0049 Discussed with vascular neurology.  They did not see an LVO and recommend an MRI W WO [BD]   0049 Hemoglobin(!):  12.7  baseline [BD]   0124 Comprehensive metabolic panel(!)  CMP unremarkable without significant electrolyte derangement, impaired renal function, or elevated LFTs   [BD]   0422 Urinalysis, Reflex to Urine Culture Urine, Clean Catch(!)  UA unremarkable without evidence of infection or hematuria   [BD]   0534 There are currently no available EEG machines in the hospital.  The patient is back to a heel neurological exam, so active seizures very unlikely.      Discussed the case with vascular neurology who recommends admission to Hospital Medicine/Heme-Onc for further observation and monitoring. [BD]   0553 Discussed with Heme-Onc who will admit the patient to their service.    Procedure: Critical Care  Please put in 30 minutes of critical care.   Critical care was necessary to treat or prevent imminent or life-threatening deterioration of the following conditions:  acute neuro deficit. Considered tpa but did not administer given hx of brain mets       [BD]      ED Course User Index  [BD] Daniel Davenport MD                             Clinical Impression:  Final diagnoses:  [R29.818] Acute focal neurological deficit  [R29.6] Unwitnessed fall          ED Disposition Condition    Admit                 Daniel Davenport MD  02/07/25 0600

## 2025-02-07 NOTE — HPI
80 y/o Male with PMHx of lung cancer with brain Mets, HTN, HLD, CAD, obesity, ex-smoker, presents to [facility] ED with unwitnessed fall. Per family at bedside, patient was found face down on the floor around 2330 yesterday while patient was attempting to go to the bathroom and EMS was called. Per EMS, patient initially had a gaze deficit to the right but was able to help transferred to the stretcher.  However, he then developed worsening left-sided weakness. Per patient's family, he was last known normal around 1900 yesterday. Stroke code activated on arrival to the ED. Glucose was around 140.

## 2025-02-07 NOTE — ED TRIAGE NOTES
Pt brought to ED via EMs from home. Per family and pt he got up to use the bathroom at 2330 and fell and was found on floor. Per EMs pt with right gaze and left sided weakness. Pt transported to ED for STROKE EVAL.

## 2025-02-07 NOTE — ED NOTES
Patient refusing cardiac monitoring and refusing to sit in stretcher, patient remains in chair at this time.

## 2025-02-07 NOTE — TELEPHONE ENCOUNTER
----- Message from Jaimee sent at 2/7/2025  3:14 PM CST -----  Regarding: Patient advice  Contact: Marilou  [phone]            Name of Caller: Marilou pt's daughter      Contact Preference:  [phone]     Nature of Call: Requesting a call back have concerns of pt not being able to see states this just started today she would like advice

## 2025-02-07 NOTE — ED TRIAGE NOTES
Jordin Allen Jr., a 79 y.o. male presents to the ED w/ complaint of loss of vision, pt was recently d/c for stroke work up.    Triage note:  Chief Complaint   Patient presents with    Loss of Vision     Vision lost on left eye after he was discharge around 2:30pm today.     Review of patient's allergies indicates:   Allergen Reactions    Brilinta [ticagrelor] Itching    Lisinopril      Other reaction(s): cough    Metoprolol succinate Rash     Past Medical History:   Diagnosis Date    Benign neoplasm of colon 10/01/2013    Bronchitis chronic     CAD (coronary artery disease) 10/31/2013    COPD (chronic obstructive pulmonary disease)     Emphysema of lung     Encounter for preventive health examination 3/21/2018    GERD (gastroesophageal reflux disease)     Hx of colonic polyps     Hypertension     NAFLD (nonalcoholic fatty liver disease) 03/27/2017    SHOLA on CPAP     RLS (restless legs syndrome)     Screen for colon cancer 08/30/2013

## 2025-02-07 NOTE — HPI
78 y/o Male with PMHx of lung cancer with brain Mets, HTN, HLD, CAD, obesity, ex-smoker, presents to [facility] ED with unwitnessed fall. Per family at bedside, patient was found face down on the floor around 2330 yesterday while patient was attempting to go to the bathroom and EMS was called. Per EMS, patient initially had a gaze deficit to the right but was able to help transferred to the stretcher. However, he then developed worsening left-sided weakness. Per patient's family, he was last known normal around 1900 yesterday. Stroke code activated on arrival to the ED. Glucose was around 140.   Per vascular neuro:  -CTA Stroke MP with no evidence for major territorial infarction or acute intracranial hemorrhage; no large vessel occlusion.  -Patient not a candidate for acute intervention at this time. No TNK due to brain mets and OOW. No IR as no acute LVO on CTA.  -MRI Brain W/WO negative for acute stroke.   Per ED doc, patient's LSW is improved but still with a R gaze preference. Recommend EEG to rule out seizures due to R gaze preference. Vascular neurology will sign off. Further workup per ED provider.    No available EEG machines int [facility]. Patient seen and evaluated. No new deficits. Patient feels well and would like to go home. He should have outpatient EEG.

## 2025-02-07 NOTE — DISCHARGE SUMMARY
[facility] - Emergency Dept  Hematology/Oncology  Discharge Summary      Patient Name: Jordin Allen Jr.  MRN: [id number]  Admission Date: 2/7/2025  Hospital Length of Stay: 0 days  Discharge Date and Time:  02/07/2025 12:10 PM  Attending Physician: Sharla Rodríguez MD   Discharging Provider: Ilia Waggoner DO  Primary Care Provider: Sierra Landry MD    HPI: 80 y/o Male with PMHx of lung cancer with brain Mets, HTN, HLD, CAD, obesity, ex-smoker, presents to [facility] ED with unwitnessed fall. Per family at bedside, patient was found face down on the floor around 2330 yesterday while patient was attempting to go to the bathroom and EMS was called. Per EMS, patient initially had a gaze deficit to the right but was able to help transferred to the stretcher. However, he then developed worsening left-sided weakness. Per patient's family, he was last known normal around 1900 yesterday. Stroke code activated on arrival to the ED. Glucose was around 140.   Per vascular neuro:  -CTA Stroke MP with no evidence for major territorial infarction or acute intracranial hemorrhage; no large vessel occlusion.  -Patient not a candidate for acute intervention at this time. No TNK due to brain mets and OOW. No IR as no acute LVO on CTA.  -MRI Brain W/WO negative for acute stroke.   Per ED doc, patient's LSW is improved but still with a R gaze preference. Recommend EEG to rule out seizures due to R gaze preference. Vascular neurology will sign off. Further workup per ED provider.    No available EEG machines in the hospital. Patient seen and evaluated. No new deficits. Patient feels well and would like to go home. He should have outpatient EEG. Neurology outpatient consult placed.       Goals of Care Treatment Preferences:  Code Status: Full Code          What is most important right now is to focus on remaining as independent as possible, symptom/pain control, improvement in condition but with limits to invasive therapies.  Accordingly,  we have decided that the best plan to meet the patient's goals includes continuing with treatment.      Consults:   Consults (From admission, onward)          Status Ordering Provider     Inpatient consult to Neurology Services (Vascular Neurology)  Once        Provider:  (Not yet assigned)    Completed RAJESH BURCH            Significant Diagnostic Studies: Labs: CMP   Recent Labs   Lab 02/07/25  0030      K 3.4*      CO2 26   *   BUN 22   CREATININE 1.0   CALCIUM 9.3   PROT 6.8   ALBUMIN 3.4*   BILITOT 0.8   ALKPHOS 98   AST 16   ALT 13   ANIONGAP 11    and CBC   Recent Labs   Lab 02/07/25  0030   WBC 6.46   HGB 12.7*   HCT 39.4*          Pending Diagnostic Studies:       None          Final Active Diagnoses:    Diagnosis Date Noted POA    Gaze preference [H51.8] 02/07/2025 Yes      Problems Resolved During this Admission:      Discharged Condition: fair    Disposition: Home or Self Care    Follow Up: Neuro    Patient Instructions:      Ambulatory referral/consult to Neurology   Standing Status: Future   Referral Priority: Routine Referral Type: Consultation   Referral Reason: Specialty Services Required   Requested Specialty: Neurology   Number of Visits Requested: 1     Medications:  Reconciled Home Medications:      Medication List        CONTINUE taking these medications      * albuterol 0.63 mg/3 mL Nebu  Commonly known as: ACCUNEB  Inhale 3 mLs (0.63 mg total) by nebulization every 6 (six) hours as needed (if symptoms failed to improve with inhaler). Rescue     * albuterol 90 mcg/actuation inhaler  Commonly known as: PROVENTIL/VENTOLIN HFA  Inhale 2 puffs into the lungs every 4 (four) hours as needed for Wheezing.     amitriptyline 25 MG tablet  Commonly known as: ELAVIL  Take 1 tablet (25 mg total) by mouth once daily.     aspirin 81 MG EC tablet  Commonly known as: ECOTRIN  Take 81 mg by mouth once daily.     atorvastatin 80 MG tablet  Commonly known as: LIPITOR  TAKE 1 TABLET  (80 MG TOTAL) BY MOUTH ONCE DAILY.     BREZTRI AEROSPHERE 160-9-4.8 mcg/actuation [facility]  Generic drug: budesonide-glycopyr-formoterol  Inhale 2 puffs into the lungs 2 (two) times a day.     dexAMETHasone 4 MG Tab  Commonly known as: DECADRON  Take 1 tablet (4 mg total) by mouth 2 (two) times daily.     echinacea 400 mg Cap  Take 1 capsule by mouth once daily.     fluticasone propionate 50 mcg/actuation nasal spray  Commonly known as: FLONASE  Spray 2 sprays (100 mcg total) in Each Nostril once daily.     furosemide 20 MG tablet  Commonly known as: LASIX  Take 1 tablet (20 mg total) by mouth once daily.     * latanoprost 0.005 % ophthalmic solution  Place 1 drop into both eyes once daily.     * latanoprost 0.005 % ophthalmic solution  Instill 1 drop into both eyes every night at bedtime     losartan 100 MG tablet  Commonly known as: COZAAR  Take 1 tablet (100 mg total) by mouth once daily.     nitroGLYCERIN 0.4 MG SL tablet  Commonly known as: NITROSTAT  Place 1 tablet (0.4 mg total) under the tongue every 5 (five) minutes as needed.     OCUVITE ORAL  Take 1 capsule by mouth once daily.     omeprazole 20 MG capsule  Commonly known as: PRILOSEC  Take 1 capsule (20 mg total) by mouth once daily.     roflumilast 500 mcg Tab  Commonly known as: DALIRESP  Take 1 tablet (500 mcg total) by mouth once daily.     SENNA LAXATIVE 8.6 mg tablet  Generic drug: senna  Take 1 tablet by mouth once daily.     traZODone 50 MG tablet  Commonly known as: DESYREL  Take 1 tablet (50 mg total) by mouth every evening.           * This list has 4 medication(s) that are the same as other medications prescribed for you. Read the directions carefully, and ask your doctor or other care provider to review them with you.                  Ilia Waggoner DO  Hematology/Oncology Fellow, PGY-IV  [facility]

## 2025-02-07 NOTE — ASSESSMENT & PLAN NOTE
80 y/o Male with PMHx of lung cancer with brain Mets, HTN, HLD, CAD, obesity, ex-smoker, presents to [facility] [place] ED with unwitnessed fall. Per family at bedside, patient was found face down on the floor around 2330 yesterday while patient was attempting to go to the bathroom and EMS was called. Per EMS, patient initially had a gaze deficit to the right but was able to help transferred to the stretcher. However, he then developed worsening left-sided weakness. Per patient's family, he was last known normal around 1900 yesterday. Stroke code activated on arrival to the ED.     -. .   -NIH 13.   -CTA Stroke MP with no evidence for major territorial infarction or acute intracranial hemorrhage; no large vessel occlusion.  -Patient not a candidate for acute intervention at this time. No TNK due to brain mets and OOW. No IR as no acute LVO on CTA.  -MRI Brain W/WO negative for acute stroke.   -Per ED doc, patient's LSW is improved but still with a R gaze preference. Recommend EEG to rule out seizures due to R gaze preference. Vascular neurology will sign off. Further workup per ED provider.  -Case discussed with on-call MD.     Thank you for including us in the care of this patient. Vascular neurology will sign off. Please do not hesitate to contact us at [phone] with any questions/concerns.

## 2025-02-07 NOTE — SUBJECTIVE & OBJECTIVE
Past Medical History:   Diagnosis Date    Benign neoplasm of colon 10/01/2013    Bronchitis chronic     CAD (coronary artery disease) 10/31/2013    COPD (chronic obstructive pulmonary disease)     Emphysema of lung     Encounter for preventive health examination 3/21/2018    GERD (gastroesophageal reflux disease)     Hx of colonic polyps     Hypertension     NAFLD (nonalcoholic fatty liver disease) 2017    SHOLA on CPAP     RLS (restless legs syndrome)     Screen for colon cancer 2013     Past Surgical History:   Procedure Laterality Date    bilateral foot surgery      CARDIAC CATHETERIZATION  2013    x1    CATARACT EXTRACTION, BILATERAL      COLON SURGERY      Ace Mott MD    COLONOSCOPY N/A 3/21/2018    Procedure: COLONOSCOPY;  Surgeon: Terry Mott MD;  Location: [facility] (4TH FLR);  Service: Endoscopy;  Laterality: N/A;    COLONOSCOPY N/A 2019    Procedure: COLONOSCOPY;  Surgeon: John Mantilla MD;  [facility]  Service: Endoscopy;  Laterality: N/A;    COLONOSCOPY N/A 2022    Procedure: COLONOSCOPY;  Surgeon: MEDINA Farris MD;  [facility]  Service: Endoscopy;  Laterality: N/A;  fully vacc-inst portal-tb    ENDOBRONCHIAL ULTRASOUND Bilateral 2024    Procedure: ENDOBRONCHIAL ULTRASOUND (EBUS);  Surgeon: Naveed Aguero MD;  Location: [facility] OR;  Service: Pulmonary;  Laterality: Bilateral;    scrotal abscess removal       Social History     Tobacco Use    Smoking status: Former     Current packs/day: 0.00     Average packs/day: 1 pack/day for 40.0 years (40.0 ttl pk-yrs)     Types: Cigarettes     Start date: 8/15/1972     Quit date: 8/15/2012     Years since quittin.4     Passive exposure: Never    Smokeless tobacco: Never   Substance Use Topics    Alcohol use: Yes     Alcohol/week: 3.0 standard drinks of alcohol     Types: 3 Cans of beer per week     Comment: maybe 2-3  beers weekly    Drug use: No     Review of  patient's allergies indicates:   Allergen Reactions    Brilinta [ticagrelor] Itching    Lisinopril      Other reaction(s): cough    Metoprolol succinate Rash       Medications: I have reviewed the current medication administration record.    (Not in a hospital admission)      Review of Systems   Unable to perform ROS: Acuity of condition     Objective:     Vital Signs (Most Recent):  Temp: 97.4 °F (36.3 °C) (02/07/25 0011)  Pulse: 95 (02/07/25 0200)  Resp: 20 (02/07/25 0200)  BP: (!) 154/72 (02/07/25 0200)  SpO2: 95 % (02/07/25 0200)    Vital Signs Range (Last 24H):  Temp:  [97.4 °F (36.3 °C)]   Pulse:  [90-95]   Resp:  [16-20]   BP: (154-168)/(72-98)   SpO2:  [95 %-96 %]        Physical Exam  Vitals reviewed.   Constitutional:       General: He is not in acute distress.  HENT:      Head: Atraumatic.      Mouth/Throat:      Mouth: Mucous membranes are dry.   Eyes:      Pupils: Pupils are equal, round, and reactive to light.   Cardiovascular:      Rate and Rhythm: Normal rate.   Pulmonary:      Effort: Pulmonary effort is normal. No respiratory distress.   Abdominal:      Tenderness: There is no guarding.   Skin:     General: Skin is warm.      Comments: Left arm skin tear    Neurological:      Mental Status: He is alert and oriented to person, place, and time.      Cranial Nerves: Cranial nerve deficit present.      Sensory: Sensory deficit present.      Motor: Weakness present.   Psychiatric:         Mood and Affect: Mood normal.         Behavior: Behavior normal.              Neurological Exam:   LOC: alert  Attention Span: Good   Language: No aphasia  Articulation: No dysarthria  Orientation: Person, Place, Time   Visual Fields: Visual neglect  EOM (CN III, IV, VI): Gaze preference  right  Pupils (CN II, III): PERRL  Facial Sensation (CN V): Normal  Facial Movement (CN VII): Symmetric facial expression    Motor: Arm left  Paresis: 3/5  Leg left  Paresis: 4/5  Arm right  Normal 5/5  Leg right Normal  "5/5  Sensation: Sabino-anesthesia left      Laboratory:  CMP:   Recent Labs   Lab 02/07/25 0030   CALCIUM 9.3   ALBUMIN 3.4*   PROT 6.8      K 3.4*   CO2 26      BUN 22   CREATININE 1.0   ALKPHOS 98   ALT 13   AST 16   BILITOT 0.8     CBC:   Recent Labs   Lab 02/07/25 0030   WBC 6.46   RBC 4.52*   HGB 12.7*   HCT 39.4*      MCV 87   MCH 28.1   MCHC 32.2     Lipid Panel:   Recent Labs   Lab 02/07/25 0030   CHOL 101*   LDLCALC 48.2*   HDL 33*   TRIG 99     Coagulation:   Recent Labs   Lab 02/07/25 0030   INR 1.0     Hgb A1C: No results for input(s): "HGBA1C" in the last 168 hours.  TSH:   Recent Labs   Lab 02/07/25 0030   TSH 1.152       Diagnostic Results:      Brain imaging/Vessel Imaging:    MRI Brain W/WO - 2/7/2025    Official radiology read unavailable. Imaging reviewed by stroke team. No acute intracranial abnormalities.     CTA Stroke MP - 2/7/2025    Official radiology read unavailable. Imaging reviewed by stroke team. No acute intracranial abnormalities, no large vessel occlusion.     Cardiac Evaluation:     EKG - 2/7/2025    Sinus tachycardia with 1st degree AV Block with PACs, vent rate 101 bpm.     "

## 2025-02-07 NOTE — PLAN OF CARE
Problem: SLP  Goal: SLP Goal  Description: Speech Language Pathology Goals  Goals expected to be met by 2/20    1. Pt will tolerate regular diet with thin liquids without signs of airway compromise   Outcome: Progressing     Bedside swallow evaluation completed. Recommend regular diet with thin liquids with standard aspiration precautions. SLP will continue to follow.

## 2025-02-07 NOTE — ED PROVIDER NOTES
Encounter Date: 2/7/2025       History     Chief Complaint   Patient presents with    Loss of Vision     Vision lost on left eye after he was discharge around 2:30pm today.     The history is provided by the patient and medical records. No  was used.     Jordin Allen Jr. is a 79 y.o. male with medical history of lung cancer with brain Mets, HTN, HLD, CAD, obesity, ex-smoker presenting to the ED with the chief complaint of loss of vision.     Patient was discharged earlier today from hem-onc service after admission for TIA concerns. He was found face down on the ground last night and was noted to have a temporary gaze deficit to the right and worsening left sided weakness. MRI brain w/wo obtained that was negative for acute CVA. Recommended to have outpatient EEG. Discharged on Decadron.     Patient returned home and was changing clothes with his daughter. Daughter reports he suddenly said that he lost his vision. Describes not being able to see his left visual field out of both eyes. LKN was 2:30pm. Denies eye pain. Denies extremity weakness or sensory changes. No speech changes. No falls. Daughter reports he has been moving slower over the last few days and needing a cane. Also reports he has been easily agitated and inpatient. She did mention an episode of confusion while driving home from the hospital today. Reports he did not know what street he was on which is unusual for him.     Review of patient's allergies indicates:   Allergen Reactions    Brilinta [ticagrelor] Itching    Lisinopril      Other reaction(s): cough    Metoprolol succinate Rash     Past Medical History:   Diagnosis Date    Benign neoplasm of colon 10/01/2013    Bronchitis chronic     CAD (coronary artery disease) 10/31/2013    COPD (chronic obstructive pulmonary disease)     Emphysema of lung     Encounter for preventive health examination 3/21/2018    GERD (gastroesophageal reflux disease)     Hx of colonic polyps      Hypertension     NAFLD (nonalcoholic fatty liver disease) 2017    SHOLA on CPAP     RLS (restless legs syndrome)     Screen for colon cancer 2013     Past Surgical History:   Procedure Laterality Date    bilateral foot surgery      CARDIAC CATHETERIZATION  2013    x1    CATARACT EXTRACTION, BILATERAL      COLON SURGERY      Ace Mott MD    COLONOSCOPY N/A 3/21/2018    Procedure: COLONOSCOPY;  Surgeon: Terry oMtt MD;  Location: [facility] ENDO (4TH FLR);  Service: Endoscopy;  Laterality: N/A;    COLONOSCOPY N/A 2019    Procedure: COLONOSCOPY;  Surgeon: John Mantilla MD;  Location: [facility] ENDO (4TH FLR);  Service: Endoscopy;  Laterality: N/A;    COLONOSCOPY N/A 2022    Procedure: COLONOSCOPY;  Surgeon: MEDINA Farris MD;  Location: [facility] ENDO (4TH FLR);  Service: Endoscopy;  Laterality: N/A;  fully vacc-inst portal-tb    ENDOBRONCHIAL ULTRASOUND Bilateral 2024    Procedure: ENDOBRONCHIAL ULTRASOUND (EBUS);  Surgeon: Naveed Aguero MD;  [facility];  Service: Pulmonary;  Laterality: Bilateral;    scrotal abscess removal       Family History   Problem Relation Name Age of Onset    Heart disease Mother      Heart attack Mother      Hypertension Mother      No Known Problems Sister      No Known Problems Daughter 2     Asthma Neg Hx      Emphysema Neg Hx      Prostate cancer Neg Hx      Cirrhosis Neg Hx       Social History     Tobacco Use    Smoking status: Former     Current packs/day: 0.00     Average packs/day: 1 pack/day for 40.0 years (40.0 ttl pk-yrs)     Types: Cigarettes     Start date: 8/15/1972     Quit date: 8/15/2012     Years since quittin.4     Passive exposure: Never    Smokeless tobacco: Never   Substance Use Topics    Alcohol use: Yes     Alcohol/week: 3.0 standard drinks of alcohol     Types: 3 Cans of beer per week     Comment: maybe 2-3  beers weekly    Drug use: No     Review of Systems   Eyes:  Positive for visual disturbance.       Physical Exam      Initial Vitals [02/07/25 1627]   BP Pulse Resp Temp SpO2   (!) 178/89 106 20 98 °F (36.7 °C) 97 %      MAP       --         Physical Exam    Constitutional: He appears well-developed and well-nourished. He is not diaphoretic. He is easily aroused.   HENT:   Head: Normocephalic and atraumatic. Mouth/Throat: Oropharynx is clear and moist. No oropharyngeal exudate.   Eyes: EOM and lids are normal. Pupils are equal, round, and reactive to light. No scleral icterus.   L visual field loss with both eyes   Neck: Phonation normal. Neck supple. No stridor present.   Normal range of motion.  Cardiovascular:  Normal rate and regular rhythm.           Pulmonary/Chest: Breath sounds normal. No stridor. No respiratory distress. He has no wheezes. He has no rales.   Abdominal: Abdomen is soft. He exhibits no distension. There is no abdominal tenderness. There is no rebound.   Musculoskeletal:         General: No tenderness or edema. Normal range of motion.      Cervical back: Normal range of motion and neck supple.     Neurological: He is alert, oriented to person, place, and time and easily aroused. He has normal strength. No sensory deficit.   Follows commands appropriately. No dysmetria or dysdiadochokinesia. No focal weakness or sensory deficit.    Skin: Skin is warm and dry. No rash noted. No erythema.   Scattered bruising to extremities   Psychiatric: He has a normal mood and affect. His speech is normal.         ED Course   Critical Care    Date/Time: 2/7/2025 9:12 PM    Performed by: William Luong PA-C  Authorized by: Loraine Mo MD  Direct patient critical care time: 17 minutes  Additional history critical care time: 6 minutes  Ordering / reviewing critical care time: 5 minutes  Documentation critical care time: 4 minutes  Consulting other physicians critical care time: 5 minutes  Total critical care time (exclusive of procedural time) : 37 minutes  Critical care was necessary to treat or prevent imminent  or life-threatening deterioration of the following conditions: CNS failure or compromise.        Labs Reviewed   CBC W/ AUTO DIFFERENTIAL - Abnormal       Result Value    WBC 4.53      RBC 4.45 (*)     Hemoglobin 12.4 (*)     Hematocrit 38.6 (*)     MCV 87      MCH 27.9      MCHC 32.1      RDW 13.8      Platelets 188      MPV 10.2      Immature Granulocytes 1.3 (*)     Gran # (ANC) 4.2      Immature Grans (Abs) 0.06 (*)     Lymph # 0.2 (*)     Mono # 0.1 (*)     Eos # 0.0      Baso # 0.02      nRBC 0      Gran % 93.5 (*)     Lymph % 3.5 (*)     Mono % 1.3 (*)     Eosinophil % 0.0      Basophil % 0.4      Differential Method Automated     COMPREHENSIVE METABOLIC PANEL - Abnormal    Sodium 138      Potassium 3.8      Chloride 104      CO2 22 (*)     Glucose 135 (*)     BUN 16      Creatinine 1.1      Calcium 9.4      Total Protein 6.7      Albumin 3.4 (*)     Total Bilirubin 0.8      Alkaline Phosphatase 89      AST 15      ALT 14      eGFR >60.0      Anion Gap 12     TSH - Abnormal    TSH 0.316 (*)    LIPID PANEL - Abnormal    Cholesterol 101 (*)     Triglycerides 64      HDL 35 (*)     LDL Cholesterol 53.2 (*)     HDL/Cholesterol Ratio 34.7      Total Cholesterol/HDL Ratio 2.9      Non-HDL Cholesterol 66     POCT GLUCOSE - Abnormal    POCT Glucose 142 (*)    PROTIME-INR    Prothrombin Time 11.4      INR 1.0     T4, FREE    Free T4 1.19     URINALYSIS, REFLEX TO URINE CULTURE   POCT GLUCOSE, HAND-HELD DEVICE   ISTAT CREATININE    POC Creatinine 1.1      Sample unknown     ISTAT PROCEDURE    POC PTWBT 10.3      POC PTINR 1.0      Sample unknown            Imaging Results              CTA STROKE MULTI-PHASE (Final result)  Result time 02/07/25 17:08:38      Final result by Rush Lerma MD (02/07/25 17:08:38)                   Impression:      No evidence of acute hemorrhage or major vascular distribution infarct.    Patient with known small metastatic lesions, better delineated on the recent MRI.  No new  worsening edema or intracranial mass effect.    CT arteriogram appears unchanged from recent study, again exhibiting modest scattered atherosclerotic calcification.  No evidence of a new high-grade stenosis or intracranial large vessel occlusion.      Electronically signed by: Rush Lerma MD  Date:    02/07/2025  Time:    17:08               Narrative:    EXAMINATION:  CTA STROKE MULTI-PHASE    CLINICAL HISTORY:  Neuro deficit, acute, stroke suspected;    TECHNIQUE:  Axial CT images obtained throughout the region of the head before the administration of intravenous contrast.    CT angiogram was performed through the cervical and intracranial vasculature during the IV bolus administration of 100mL of Omnipaque 350.  Two additional phases through the intracranial vasculature via multiphase technique.    Multiplanar MPR and MIP reformats were performed.    CT source data was analyzed using artificial intelligence software for detection of a large vessel occlusions (LVO) in order to enable computer assisted triage notification and aid clinical stroke decision making.    COMPARISON:  MRI head CT performed earlier on 02/07/2025    FINDINGS:  The ventricles are normal in size without evidence of hydrocephalus.    Brain appears unchanged from the recent MRI allowing for variation in modality.  No parenchymal changes to indicate an acute major vascular distribution infarct.  No acute parenchymal hemorrhage.  There are known enhancing lesions reported as metastatic disease again noting some focal edema in the paramedian right temporal occipital junction.  No new focal edema or intracranial mass effect    No extra-axial blood or fluid collections.    The cranium appears intact.    Bilateral pseudophakia.    Smoking related changes in partially visualized lung apices.    Mild cervical spondylosis.      CTA:    The aortic arch demonstratesmild calcification, but no significant stenosis at the major branch vessel origins.   Aberrant right subclavian artery.    The right common carotid artery is normal in caliber.  Few small calcifications, without significant stenosis at the carotid bifurcation.The right internal carotid artery is normal in caliber.    The left common carotid artery is normal in caliber. Few small calcifications, without significant stenosis at the carotid bifurcation.The left internal carotid artery is normal in caliber.    The right vertebral artery is developmentally hypoplastic without focal narrowing.    The left vertebral artery is normal in caliber.  Mild calcification in the intracranial segment.    Basilar artery is within normal limits without focal abnormality.    The proximal anterior, middle, and posterior cerebral arteries are within normal limits.  No evidence of significant stenosis, focal occlusion, or intracranial aneurysm.                                       Medications   iohexoL (OMNIPAQUE 350) injection 100 mL (100 mLs Intravenous Not Given 2/7/25 1655)   sodium chloride 0.9% flush 10 mL (has no administration in time range)   naloxone 0.4 mg/mL injection 0.02 mg (has no administration in time range)   glucose chewable tablet 16 g (has no administration in time range)   glucose chewable tablet 24 g (has no administration in time range)   dextrose 50% injection 12.5 g (has no administration in time range)   dextrose 50% injection 25 g (has no administration in time range)   glucagon (human recombinant) injection 1 mg (has no administration in time range)   enoxaparin injection 40 mg (has no administration in time range)   albuterol inhaler 2 puff (has no administration in time range)   amitriptyline tablet 25 mg (has no administration in time range)   aspirin EC tablet 81 mg (has no administration in time range)   atorvastatin tablet 80 mg (has no administration in time range)   dexAMETHasone tablet 4 mg (has no administration in time range)   fluticasone propionate 50 mcg/actuation nasal spray  100 mcg (has no administration in time range)   furosemide tablet 20 mg (has no administration in time range)   losartan tablet 100 mg (has no administration in time range)   nitroGLYCERIN SL tablet 0.4 mg (has no administration in time range)   pantoprazole EC tablet 40 mg (has no administration in time range)   roflumilast (DALIRESP) tablet 500 mcg (has no administration in time range)   traZODone tablet 50 mg (has no administration in time range)   acetaminophen tablet 650 mg (has no administration in time range)     Medical Decision Making  79 y.o. male with medical history of lung cancer with brain Mets, HTN, HLD, CAD, obesity, ex-smoker presenting to the ED c/o vision loss beginning earlier today.     DDx includes but not limited to ischemic stroke, TIA, intracranial hemorrhage, SDH, brain mass, intracranial hypertension, cerebral hypoperfusion, complex migraine, seizure d/o.     Amount and/or Complexity of Data Reviewed  External Data Reviewed: labs and notes.  Labs: ordered. Decision-making details documented in ED Course.  Radiology: ordered and independent interpretation performed.  ECG/medicine tests: ordered and independent interpretation performed.    Risk  Prescription drug management.  Decision regarding hospitalization.               ED Course as of 02/07/25 2113 Fri Feb 07, 2025 1747 CTA stroke multiphase showing No evidence of acute hemorrhage or major vascular distribution infarct.  Patient with known small metastatic lesions, better delineated on the recent MRI.  No new worsening edema or intracranial mass effect. [BA]   1748 Stroke team evaluated at bedside. No intervention indicated. Recommending EEG and MRI brain for further evaluation. [BA]   2112 Discussed with oncology who admitted to their service for further management. Patient expresses understanding and agreeable to the plan. I have discussed the care of this patient with my supervising physician.  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

## 2025-02-07 NOTE — TELEPHONE ENCOUNTER
"I spoke to patient's daughter. She said he fell last night and she was sleeping. Her daughter found him and they called 911. The took him to the hospital where they thought he might have had a stroke. They did a CT and an MRI which resulted in no s/s of stroke. He stayed in the ED for a while. She said she spoke to a doctor who she thinks is affiliated with Dr. Nuñez. He told her the next thing they would normally do is an EEG to see if he had a seizure, but there were no EEG machines in the hospital. He told her that they could "stay in the [facility] that is the ED, or they could go home and make the EEG outpatient appointment and go to that". They left. She said now he is complaining that he can't see out of his left eye and he's confused. I told her he probably needed to go back to the ED, but I would talk to Dr. Nuñez and call her back.  I spoke to Dr. Nuñez, he recommended they return to the ED. I called her back and told her that. She verbalized understanding and said she will bring him.   "

## 2025-02-07 NOTE — PT/OT/SLP EVAL
Speech Language Pathology Evaluation  Bedside Swallow    Patient Name:  Jordin Allen Jr.   MRN:  [id number]  Admitting Diagnosis: Gaze preference    Recommendations:                 General Recommendations:   diet check x1, monitor cognitive skills  Diet recommendations:  Regular Diet - IDDSI Level 7, Thin liquids - IDDSI Level 0   Aspiration Precautions: 1 bite/sip at a time, Avoid talking while eating, Eliminate distractions, HOB to 90 degrees, Meds whole 1 at a time, Small bites/sips, and Standard aspiration precautions   General Precautions: Standard, aspiration, fall  Communication strategies:  none    Assessment:     Jordin Allen Jr. is a 79 y.o. male with a functional swallow for a regular diet with thin liquids.     History:     Past Medical History:   Diagnosis Date    Benign neoplasm of colon 10/01/2013    Bronchitis chronic     CAD (coronary artery disease) 10/31/2013    COPD (chronic obstructive pulmonary disease)     Emphysema of lung     Encounter for preventive health examination 3/21/2018    GERD (gastroesophageal reflux disease)     Hx of colonic polyps     Hypertension     NAFLD (nonalcoholic fatty liver disease) 03/27/2017    SHOLA on CPAP     RLS (restless legs syndrome)     Screen for colon cancer 08/30/2013       Past Surgical History:   Procedure Laterality Date    bilateral foot surgery  1993    CARDIAC CATHETERIZATION  2013    x1    CATARACT EXTRACTION, BILATERAL      COLON SURGERY  2013    Ace Mott MD    COLONOSCOPY N/A 3/21/2018    Procedure: COLONOSCOPY;  Surgeon: Terry Mott MD;  Location: [address]);  Service: Endoscopy;  Laterality: N/A;    COLONOSCOPY N/A 4/12/2019    Procedure: COLONOSCOPY;  Surgeon: John Mantilla MD;  Location: [address]);  Service: Endoscopy;  Laterality: N/A;    COLONOSCOPY N/A 5/31/2022    Procedure: COLONOSCOPY;  Surgeon: MEDINA Farris MD;  Location: [address]);  Service: Endoscopy;  Laterality: N/A;  fully vacc-inst  "portal-tb    ENDOBRONCHIAL ULTRASOUND Bilateral 4/30/2024    Procedure: ENDOBRONCHIAL ULTRASOUND (EBUS);  Surgeon: Naveed Aguero MD;  Location: [facility] OR;  Service: Pulmonary;  Laterality: Bilateral;    scrotal abscess removal       HPI: Male with PMH of lung cancer with brain Mets presents with unwitnessed fall.  Patient is currently unable to provide a comprehensive history given altered mental status.  Per patient's family, he was last known normal around 11:30 p.m. they found him after he fell trying to get up and go to the bathroom.  Per EMS, patient initially had a gaze deficit to the right but was able to help transferred to the stretcher.  However, he then developed worsening left-sided weakness.  Glucose was around 140.  The history is provided by the patient and medical records.    Prior Intubation HX:  none    Modified Barium Swallow: none    Chest X-Rays: 2/6/25 "Impression: Probable COPD. Thoracic aortic atherosclerosis. Otherwise, no acute radiographic abnormality in the visualized chest."    Prior diet: pt currently npo but baseline diet regular/thin liquids.    Subjective     Pt awake and alert upon arrival. Pt agreeable to participate. Nursing cleared for session.     Pain/Comfort:  Pain Rating 1: 0/10    Respiratory Status: Nasal cannula, flow 2 L/min    Objective:     Oral Musculature Evaluation  Oral Musculature: left weakness  Dentition: present and adequate  Oral Labial Strength and Mobility: WFL  Lingual Strength and Mobility: WFL  Volitional Cough: elicited  Volitional Swallow: elicited  Voice Prior to PO Intake: clear    Bedside Swallow Eval:   Consistencies Assessed:  Thin liquids single and cyclic sips via straw x6  Puree via tsp x3  Solids cece cracker       Oral Phase:   WFL    Pharyngeal Phase:   no overt clinical signs/symptoms of aspiration  no overt clinical signs/symptoms of pharyngeal dysphagia    Compensatory Strategies  None    Treatment: Pt tolerating trials without signs of " aspiration. Recommend regular diet with thin liquids with standard aspiration precautions. Pt fixated on his phone not working and wanting to call his daughter. Pt's phone was working and SLP assisted in him using the phone. Pt very easily distracted. Some concern for cognitive skills and should monitor. SLP provided education regarding role of SLP, aspiration precautions, recommendations, and SLP POC. Pt expressed understanding but would benefit from reinforcement. SLP will continue to follow.     Goals:   Multidisciplinary Problems       SLP Goals          Problem: SLP    Goal Priority Disciplines Outcome   SLP Goal     SLP Progressing   Description: Speech Language Pathology Goals  Goals expected to be met by 2/20    1. Pt will tolerate regular diet with thin liquids without signs of airway compromise                               Plan:     Patient to be seen:  3 x/week   Plan of Care expires:     Plan of Care reviewed with:  patient   SLP Follow-Up:  Yes       Discharge recommendations:   (tbd)     Time Tracking:     SLP Treatment Date:   02/07/25  Speech Start Time:  0835  Speech Stop Time:  0855     Speech Total Time (min):  20 min    Billable Minutes: Eval Swallow and Oral Function 12 and Self Care/Home Management Training 8    02/07/2025

## 2025-02-07 NOTE — FIRST PROVIDER EVALUATION
"Medical screening examination initiated.  I have conducted a focused provider triage encounter, findings are as follows:    Brief history of present illness:  Recently discharged after being admitted for TIA/stroke and possible seizures. Discharged, now with abrupt onset L side visual field deficits. PATTI negative, LKN 1430    Vitals:    02/07/25 1627   BP: (!) 178/89   BP Location: Right arm   Patient Position: Sitting   Pulse: 106   Resp: 20   Temp: 98 °F (36.7 °C)   TempSrc: Oral   SpO2: 97%   Weight: 85.7 kg (188 lb 15 oz)   Height: 5' 9" (1.753 m)       Pertinent physical exam:  L side visual field deficit, no other deficits, speech normal, protecting airway    Brief workup plan:  Stroke code    Preliminary workup initiated; this workup will be continued and followed by the physician or advanced practice provider that is assigned to the patient when roomed.  "

## 2025-02-07 NOTE — CONSULTS
[facility] - Emergency Dept  Vascular Neurology  [facility]  Consult Note    Inpatient consult to Neurology Services (Vascular Neurology)  Consult performed by: Kerri Barry DNP  Consult ordered by: Daniel Davenport MD  Reason for consult: R gaze preference, L neglect, LSW        Assessment/Plan:     Patient is a 79 y.o. year old male with:    Gaze preference  80 y/o Male with PMHx of lung cancer with brain Mets, HTN, HLD, CAD, obesity, ex-smoker, [place] to [facility] ED with unwitnessed fall. Per family at bedside, patient was found face down on the floor around 2330 yesterday while patient was attempting to go to the bathroom and EMS was called. Per EMS, patient initially had a gaze deficit to the right but was able to help transferred to the stretcher. However, he then developed worsening left-sided weakness. Per patient's family, he was last known normal around 1900 yesterday. Stroke code activated on arrival to the ED.     -. .   -NIH 13.   -CTA Stroke MP with no evidence for major territorial infarction or acute intracranial hemorrhage; no large vessel occlusion.  -Patient not a candidate for acute intervention at this time. No TNK due to brain mets and OOW. No IR as no acute LVO on CTA.  -MRI Brain W/WO negative for acute stroke.   -Per ED doc, patient's LSW is improved but still with a R gaze preference. Recommend EEG to rule out seizures due to R gaze preference. Vascular neurology will sign off. Further workup per ED provider.  -Case discussed with on-call MD.     Thank you for including us in the care of this patient. Vascular neurology will sign off. Please do not hesitate to contact us at [phone] with any questions/concerns.             STROKE DOCUMENTATION     Acute Stroke Times   Last Known Normal Date: 02/06/25  Last Known Normal Time: 2330  Stroke Team Called Date: 02/07/25  Stroke Team Called Time: 0006  Stroke Team Arrival Date: 02/07/25  Stroke Team Arrival Time:  1206 (Stroke team already present at the ED when patient arrived)  CT Interpretation Time: 0016  Thrombolytic Therapy Recommended: No  CTA Interpretation Time: 0020  Thrombectomy Recommended: No    NIH Scale:  Interval: baseline  1a. Level of Consciousness: 0-->Alert, keenly responsive  1b. LOC Questions: 0-->Answers both questions correctly  1c. LOC Commands: 0-->Performs both tasks correctly  2. Best Gaze: 2-->Forced deviation, or total gaze paresis not overcome by the oculocephalic maneuver  3. Visual: 2-->Complete hemianopia  4. Facial Palsy: 0-->Normal symmetrical movements  5a. Motor Arm, Left: 2-->Some effort against gravity, limb cannot get to or maintain (if cued) 90 (or 45) degrees, drifts down to bed, but has some effort against gravity  5b. Motor Arm, Right: 0-->No drift, limb holds 90 (or 45) degrees for full 10 secs  6a. Motor Leg, Left: 1-->Drift, leg falls by the end of the 5-sec period but does not hit bed  6b. Motor Leg, Right: 0-->No drift, leg holds 30 degree position for full 5 secs  7. Limb Ataxia: 2-->Present in two limbs  8. Sensory: 2-->Severe to total sensory loss, patient is not aware of being touched in the face, arm, and leg  9. Best Language: 0-->No aphasia, normal  10. Dysarthria: 0-->Normal  11. Extinction and Inattention (formerly Neglect): 2-->Profound izzy-inattention/extinction more than 1 modality  Total (NIH Stroke Scale): 13    Modified [place] Score: 1  Rosales Coma Scale:15   ABCD2 Score:    [id number] Score:   HAS -BLED Score:   ICH Score:   Hunt & Haider Classification:       Thrombolysis Candidate? No, Out of window - Symptom onset > 4.5 hours, Intracranial neoplasm    Delays to Thrombolysis?  Not Applicable    Interventional Revascularization Candidate?   Is the patient eligible for mechanical endovascular reperfusion (ZELALEM)?  No; No large vessel occlusion identified on imaging     Delays to Thrombectomy? Not Applicable    Hemorrhagic change of an Ischemic Stroke: Does this  patient have an ischemic stroke with hemorrhagic changes? No     Subjective:     History of Present Illness:  80 y/o Male with PMHx of lung cancer with brain Mets, HTN, HLD, CAD, obesity, ex-smoker, presents to [facility] ED with unwitnessed fall. Per family at bedside, patient was found face down on the floor around 2330 yesterday while patient was attempting to go to the bathroom and EMS was called. Per EMS, patient initially had a gaze deficit to the right but was able to help transferred to the stretcher.  However, he then developed worsening left-sided weakness. Per patient's family, he was last known normal around 1900 yesterday. Stroke code activated on arrival to the ED. Glucose was around 140.          Past Medical History:   Diagnosis Date    Benign neoplasm of colon 10/01/2013    Bronchitis chronic     CAD (coronary artery disease) 10/31/2013    COPD (chronic obstructive pulmonary disease)     Emphysema of lung     Encounter for preventive health examination 3/21/2018    GERD (gastroesophageal reflux disease)     Hx of colonic polyps     Hypertension     NAFLD (nonalcoholic fatty liver disease) 03/27/2017    SHOLA on CPAP     RLS (restless legs syndrome)     Screen for colon cancer 08/30/2013     Past Surgical History:   Procedure Laterality Date    bilateral foot surgery  1993    CARDIAC CATHETERIZATION  2013    x1    CATARACT EXTRACTION, BILATERAL      COLON SURGERY  2013    Ace Mott MD    COLONOSCOPY N/A 3/21/2018    Procedure: COLONOSCOPY;  Surgeon: Terry Mott MD;  Location: [address]);  Service: Endoscopy;  Laterality: N/A;    COLONOSCOPY N/A 4/12/2019    Procedure: COLONOSCOPY;  Surgeon: John Mantilla MD;  Location: [address]);  Service: Endoscopy;  Laterality: N/A;    COLONOSCOPY N/A 5/31/2022    Procedure: COLONOSCOPY;  Surgeon: MEDINA Farris MD;  Location: [facility] ([address] [place] [address]);  Service: Endoscopy;  Laterality: N/A;  fully vacc-inst portal-tb    ENDOBRONCHIAL ULTRASOUND  Bilateral 2024    Procedure: ENDOBRONCHIAL ULTRASOUND (EBUS);  Surgeon: Naveed Aguero MD;  Location: [facility] OR;  Service: Pulmonary;  Laterality: Bilateral;    scrotal abscess removal       Social History     Tobacco Use    Smoking status: Former     Current packs/day: 0.00     Average packs/day: 1 pack/day for 40.0 years (40.0 ttl pk-yrs)     Types: Cigarettes     Start date: 8/15/1972     Quit date: 8/15/2012     Years since quittin.4     Passive exposure: Never    Smokeless tobacco: Never   Substance Use Topics    Alcohol use: Yes     Alcohol/week: 3.0 standard drinks of alcohol     Types: 3 Cans of beer per week     Comment: maybe 2-3  beers weekly    Drug use: No     Review of patient's allergies indicates:   Allergen Reactions    Brilinta [ticagrelor] Itching    Lisinopril      Other reaction(s): cough    Metoprolol succinate Rash       Medications: I have reviewed the current medication administration record.    (Not in a hospital admission)      Review of Systems   Unable to perform ROS: Acuity of condition     Objective:     Vital Signs (Most Recent):  Temp: 97.4 °F (36.3 °C) (25 0011)  Pulse: 95 (25 0200)  Resp: 20 (25 0200)  BP: (!) 154/72 (25 0200)  SpO2: 95 % (25 0200)    Vital Signs Range (Last 24H):  Temp:  [97.4 °F (36.3 °C)]   Pulse:  [90-95]   Resp:  [16-20]   BP: (154-168)/(72-98)   SpO2:  [95 %-96 %]        Physical Exam  Vitals reviewed.   Constitutional:       General: He is not in acute distress.  HENT:      Head: Atraumatic.      Mouth/Throat:      Mouth: Mucous membranes are dry.   Eyes:      Pupils: Pupils are equal, round, and reactive to light.   Cardiovascular:      Rate and Rhythm: Normal rate.   Pulmonary:      Effort: Pulmonary effort is normal. No respiratory distress.   Abdominal:      Tenderness: There is no guarding.   Skin:     General: Skin is warm.      Comments: Left arm skin tear    Neurological:      Mental Status: He is alert and  "oriented to person, place, and time.      Cranial Nerves: Cranial nerve deficit present.      Sensory: Sensory deficit present.      Motor: Weakness present.   Psychiatric:         Mood and Affect: Mood normal.         Behavior: Behavior normal.              Neurological Exam:   LOC: alert  Attention Span: Good   Language: No aphasia  Articulation: No dysarthria  Orientation: Person, Place, Time   Visual Fields: Visual neglect  EOM (CN III, IV, VI): Gaze preference  right  Pupils (CN II, III): PERRL  Facial Sensation (CN V): Normal  Facial Movement (CN VII): Symmetric facial expression    Motor: Arm left  Paresis: 3/5  Leg left  Paresis: 4/5  Arm right  Normal 5/5  Leg right Normal 5/5  Sensation: Sabino-anesthesia left      Laboratory:  CMP:   Recent Labs   Lab 02/07/25  0030   CALCIUM 9.3   ALBUMIN 3.4*   PROT 6.8      K 3.4*   CO2 26      BUN 22   CREATININE 1.0   ALKPHOS 98   ALT 13   AST 16   BILITOT 0.8     CBC:   Recent Labs   Lab 02/07/25  0030   WBC 6.46   RBC 4.52*   HGB 12.7*   HCT 39.4*      MCV 87   MCH 28.1   MCHC 32.2     Lipid Panel:   Recent Labs   Lab 02/07/25  0030   CHOL 101*   LDLCALC 48.2*   HDL 33*   TRIG 99     Coagulation:   Recent Labs   Lab 02/07/25  0030   INR 1.0     Hgb A1C: No results for input(s): "HGBA1C" in the last 168 hours.  TSH:   Recent Labs   Lab 02/07/25  0030   TSH 1.152       Diagnostic Results:      Brain imaging/Vessel Imaging:    MRI Brain W/WO - 2/7/2025    Official radiology read unavailable. Imaging reviewed by stroke team. No acute intracranial abnormalities.     CTA Stroke MP - 2/7/2025    Official radiology read unavailable. Imaging reviewed by stroke team. No acute intracranial abnormalities, no large vessel occlusion.     Cardiac Evaluation:     EKG - 2/7/2025    Sinus tachycardia with 1st degree AV Block with PACs, vent rate 101 bpm.       Kerri Barry, [facility]  Department of Vascular Neurology   [facility] - " Emergency Dept

## 2025-02-08 PROBLEM — G45.9 TIA (TRANSIENT ISCHEMIC ATTACK): Status: ACTIVE | Noted: 2025-02-08

## 2025-02-08 PROBLEM — R29.818 ACUTE FOCAL NEUROLOGICAL DEFICIT: Status: ACTIVE | Noted: 2025-02-08

## 2025-02-08 PROBLEM — H53.139: Status: ACTIVE | Noted: 2025-02-07

## 2025-02-08 LAB
ALBUMIN SERPL BCP-MCNC: 3.6 G/DL (ref 3.5–5.2)
ALP SERPL-CCNC: 93 U/L (ref 40–150)
ALT SERPL W/O P-5'-P-CCNC: 13 U/L (ref 10–44)
ANION GAP SERPL CALC-SCNC: 12 MMOL/L (ref 8–16)
AST SERPL-CCNC: 14 U/L (ref 10–40)
BASOPHILS # BLD AUTO: 0.01 K/UL (ref 0–0.2)
BASOPHILS NFR BLD: 0.2 % (ref 0–1.9)
BILIRUB SERPL-MCNC: 0.8 MG/DL (ref 0.1–1)
BUN SERPL-MCNC: 17 MG/DL (ref 8–23)
CALCIUM SERPL-MCNC: 9.7 MG/DL (ref 8.7–10.5)
CHLORIDE SERPL-SCNC: 104 MMOL/L (ref 95–110)
CO2 SERPL-SCNC: 24 MMOL/L (ref 23–29)
CREAT SERPL-MCNC: 0.9 MG/DL (ref 0.5–1.4)
DIFFERENTIAL METHOD BLD: ABNORMAL
EOSINOPHIL # BLD AUTO: 0 K/UL (ref 0–0.5)
EOSINOPHIL NFR BLD: 0 % (ref 0–8)
ERYTHROCYTE [DISTWIDTH] IN BLOOD BY AUTOMATED COUNT: 13.7 % (ref 11.5–14.5)
EST. GFR  (NO RACE VARIABLE): >60 ML/MIN/1.73 M^2
GLUCOSE SERPL-MCNC: 126 MG/DL (ref 70–110)
HCT VFR BLD AUTO: 37.7 % (ref 40–54)
HGB BLD-MCNC: 12.3 G/DL (ref 14–18)
IMM GRANULOCYTES # BLD AUTO: 0.04 K/UL (ref 0–0.04)
IMM GRANULOCYTES NFR BLD AUTO: 0.8 % (ref 0–0.5)
LYMPHOCYTES # BLD AUTO: 0.2 K/UL (ref 1–4.8)
LYMPHOCYTES NFR BLD: 4.3 % (ref 18–48)
MAGNESIUM SERPL-MCNC: 1.8 MG/DL (ref 1.6–2.6)
MCH RBC QN AUTO: 27.7 PG (ref 27–31)
MCHC RBC AUTO-ENTMCNC: 32.6 G/DL (ref 32–36)
MCV RBC AUTO: 85 FL (ref 82–98)
MONOCYTES # BLD AUTO: 0.1 K/UL (ref 0.3–1)
MONOCYTES NFR BLD: 1.6 % (ref 4–15)
NEUTROPHILS # BLD AUTO: 4.8 K/UL (ref 1.8–7.7)
NEUTROPHILS NFR BLD: 93.1 % (ref 38–73)
NRBC BLD-RTO: 0 /100 WBC
OHS QRS DURATION: 80 MS
OHS QTC CALCULATION: 492 MS
PHOSPHATE SERPL-MCNC: 2.6 MG/DL (ref 2.7–4.5)
PLATELET # BLD AUTO: 203 K/UL (ref 150–450)
PMV BLD AUTO: 10.1 FL (ref 9.2–12.9)
POTASSIUM SERPL-SCNC: 4 MMOL/L (ref 3.5–5.1)
PROT SERPL-MCNC: 7.2 G/DL (ref 6–8.4)
RBC # BLD AUTO: 4.44 M/UL (ref 4.6–6.2)
SARS-COV-2 RDRP RESP QL NAA+PROBE: NEGATIVE
SODIUM SERPL-SCNC: 140 MMOL/L (ref 136–145)
WBC # BLD AUTO: 5.15 K/UL (ref 3.9–12.7)

## 2025-02-08 PROCEDURE — 25000003 PHARM REV CODE 250: Performed by: STUDENT IN AN ORGANIZED HEALTH CARE EDUCATION/TRAINING PROGRAM

## 2025-02-08 PROCEDURE — 25000242 PHARM REV CODE 250 ALT 637 W/ HCPCS: Performed by: STUDENT IN AN ORGANIZED HEALTH CARE EDUCATION/TRAINING PROGRAM

## 2025-02-08 PROCEDURE — 25000003 PHARM REV CODE 250

## 2025-02-08 PROCEDURE — 85025 COMPLETE CBC W/AUTO DIFF WBC: CPT | Performed by: STUDENT IN AN ORGANIZED HEALTH CARE EDUCATION/TRAINING PROGRAM

## 2025-02-08 PROCEDURE — 63600175 PHARM REV CODE 636 W HCPCS: Performed by: STUDENT IN AN ORGANIZED HEALTH CARE EDUCATION/TRAINING PROGRAM

## 2025-02-08 PROCEDURE — 87635 SARS-COV-2 COVID-19 AMP PRB: CPT | Performed by: STUDENT IN AN ORGANIZED HEALTH CARE EDUCATION/TRAINING PROGRAM

## 2025-02-08 PROCEDURE — 84100 ASSAY OF PHOSPHORUS: CPT | Performed by: STUDENT IN AN ORGANIZED HEALTH CARE EDUCATION/TRAINING PROGRAM

## 2025-02-08 PROCEDURE — 80053 COMPREHEN METABOLIC PANEL: CPT | Performed by: STUDENT IN AN ORGANIZED HEALTH CARE EDUCATION/TRAINING PROGRAM

## 2025-02-08 PROCEDURE — 83735 ASSAY OF MAGNESIUM: CPT | Performed by: STUDENT IN AN ORGANIZED HEALTH CARE EDUCATION/TRAINING PROGRAM

## 2025-02-08 PROCEDURE — 20600001 HC STEP DOWN PRIVATE ROOM

## 2025-02-08 RX ORDER — IBUPROFEN 600 MG/1
600 TABLET ORAL EVERY 6 HOURS PRN
Status: DISCONTINUED | OUTPATIENT
Start: 2025-02-08 | End: 2025-02-12 | Stop reason: HOSPADM

## 2025-02-08 RX ORDER — MEMANTINE HYDROCHLORIDE 5 MG/1
5 TABLET ORAL 2 TIMES DAILY
Status: DISCONTINUED | OUTPATIENT
Start: 2025-02-08 | End: 2025-02-12 | Stop reason: HOSPADM

## 2025-02-08 RX ADMIN — ASPIRIN 81 MG: 81 TABLET, COATED ORAL at 08:02

## 2025-02-08 RX ADMIN — MEMANTINE HYDROCHLORIDE 5 MG: 5 TABLET ORAL at 12:02

## 2025-02-08 RX ADMIN — MEMANTINE HYDROCHLORIDE 5 MG: 5 TABLET ORAL at 08:02

## 2025-02-08 RX ADMIN — PANTOPRAZOLE SODIUM 40 MG: 40 TABLET, DELAYED RELEASE ORAL at 08:02

## 2025-02-08 RX ADMIN — ROFLUMILAST 500 MCG: 500 TABLET ORAL at 11:02

## 2025-02-08 RX ADMIN — ENOXAPARIN SODIUM 40 MG: 40 INJECTION SUBCUTANEOUS at 05:02

## 2025-02-08 RX ADMIN — DEXAMETHASONE 4 MG: 4 TABLET ORAL at 08:02

## 2025-02-08 RX ADMIN — TRAZODONE HYDROCHLORIDE 50 MG: 50 TABLET ORAL at 08:02

## 2025-02-08 RX ADMIN — ATORVASTATIN CALCIUM 80 MG: 40 TABLET, FILM COATED ORAL at 08:02

## 2025-02-08 RX ADMIN — FUROSEMIDE 20 MG: 20 TABLET ORAL at 08:02

## 2025-02-08 RX ADMIN — LOSARTAN POTASSIUM 100 MG: 50 TABLET, FILM COATED ORAL at 08:02

## 2025-02-08 RX ADMIN — FLUTICASONE PROPIONATE 100 MCG: 50 SPRAY, METERED NASAL at 11:02

## 2025-02-08 RX ADMIN — ACETAMINOPHEN 650 MG: 325 TABLET ORAL at 11:02

## 2025-02-08 RX ADMIN — AMITRIPTYLINE HYDROCHLORIDE 25 MG: 25 TABLET, FILM COATED ORAL at 09:02

## 2025-02-08 NOTE — ASSESSMENT & PLAN NOTE
Patient presented to the ED again recent discharge for TIA concerns. He was found face down on the ground last night and was noted to have a temporary gaze deficit to the right and worsening left sided weakness. MRI brain w/wo obtained that was negative for acute CVA. Recommended to have outpatient EEG. Discharged on Decadron.     Repeat MRI  Repeat EEG  Continue dexamethasone  Consider formal neuro consult  Neuro checks

## 2025-02-08 NOTE — SUBJECTIVE & OBJECTIVE
Oncology Treatment Plan:   OP DURVALUMAB 1500 MG Q4W    Medications:  Continuous Infusions:  Scheduled Meds:   amitriptyline  25 mg Oral QHS    aspirin  81 mg Oral Daily    atorvastatin  80 mg Oral Daily    dexAMETHasone  4 mg Oral BID    enoxparin  40 mg Subcutaneous Q24H (prophylaxis, 1700)    fluticasone propionate  2 spray Each Nostril Daily    furosemide  20 mg Oral Daily    losartan  100 mg Oral Daily    memantine  5 mg Oral BID    pantoprazole  40 mg Oral Daily    roflumilast  1 tablet Oral Daily    traZODone  50 mg Oral QHS     PRN Meds:  Current Facility-Administered Medications:     acetaminophen, 650 mg, Oral, Q6H PRN    albuterol, 2 puff, Inhalation, Q4H PRN    dextrose 50%, 12.5 g, Intravenous, PRN    dextrose 50%, 25 g, Intravenous, PRN    glucagon (human recombinant), 1 mg, Intramuscular, PRN    glucose, 16 g, Oral, PRN    glucose, 24 g, Oral, PRN    ibuprofen, 600 mg, Oral, Q6H PRN    iohexoL, 100 mL, Intravenous, ONCE PRN    naloxone, 0.02 mg, Intravenous, PRN    nitroGLYCERIN, 0.4 mg, Sublingual, Q5 Min PRN    sodium chloride 0.9%, 10 mL, Intravenous, Q12H PRN     Review of Systems  Objective:     Vital Signs (Most Recent):  Temp: 97.6 °F (36.4 °C) (02/08/25 1122)  Pulse: 101 (02/08/25 1122)  Resp: 18 (02/08/25 1122)  BP: 126/68 (02/08/25 1122)  SpO2: 96 % (02/08/25 1122) Vital Signs (24h Range):  Temp:  [97.3 °F (36.3 °C)-98 °F (36.7 °C)] 97.6 °F (36.4 °C)  Pulse:  [] 101  Resp:  [16-20] 18  SpO2:  [96 %-99 %] 96 %  BP: (116-178)/(65-93) 126/68     Weight: 85.7 kg (188 lb 15 oz)  Body mass index is 27.9 kg/m².  Body surface area is 2.04 meters squared.    No intake or output data in the 24 hours ending 02/08/25 1137     Physical Exam  Vitals reviewed.   Constitutional:       Appearance: Normal appearance.   HENT:      Head: Normocephalic and atraumatic.      Mouth/Throat:      Pharynx: Oropharynx is clear.   Eyes:      General: No scleral icterus.     Conjunctiva/sclera: Conjunctivae normal.    Cardiovascular:      Rate and Rhythm: Normal rate and regular rhythm.      Pulses: Normal pulses.      Heart sounds: Normal heart sounds.   Pulmonary:      Effort: Pulmonary effort is normal. No respiratory distress.   Abdominal:      General: Abdomen is flat.      Tenderness: There is no abdominal tenderness. There is no guarding or rebound.   Skin:     Coloration: Skin is not jaundiced.   Neurological:      General: No focal deficit present.      Mental Status: He is alert and oriented to person, place, and time.   Psychiatric:         Mood and Affect: Mood normal.         Behavior: Behavior normal.          Significant Labs:   All pertinent labs from the last 24 hours have been reviewed.    Diagnostic Results:  I have reviewed and interpreted all pertinent imaging results/findings within the past 24 hours.

## 2025-02-08 NOTE — SUBJECTIVE & OBJECTIVE
Past Medical History:   Diagnosis Date    Benign neoplasm of colon 10/01/2013    Bronchitis chronic     CAD (coronary artery disease) 10/31/2013    COPD (chronic obstructive pulmonary disease)     Emphysema of lung     Encounter for preventive health examination 3/21/2018    GERD (gastroesophageal reflux disease)     Hx of colonic polyps     Hypertension     NAFLD (nonalcoholic fatty liver disease) 2017    SHOLA on CPAP     RLS (restless legs syndrome)     Screen for colon cancer 2013     Past Surgical History:   Procedure Laterality Date    bilateral foot surgery      CARDIAC CATHETERIZATION  2013    x1    CATARACT EXTRACTION, BILATERAL      COLON SURGERY      Ace Mott MD    COLONOSCOPY N/A 3/21/2018    Procedure: COLONOSCOPY;  Surgeon: Terry Mott MD;  Location: [facility] (4TH FLR);  Service: Endoscopy;  Laterality: N/A;    COLONOSCOPY N/A 2019    Procedure: COLONOSCOPY;  Surgeon: John Mantilla MD;  [facility]  Service: Endoscopy;  Laterality: N/A;    COLONOSCOPY N/A 2022    Procedure: COLONOSCOPY;  Surgeon: MEDINA Farris MD;  [facility]  Service: Endoscopy;  Laterality: N/A;  fully vacc-inst portal-tb    ENDOBRONCHIAL ULTRASOUND Bilateral 2024    Procedure: ENDOBRONCHIAL ULTRASOUND (EBUS);  Surgeon: Naveed Aguero MD;  [facility];  Service: Pulmonary;  Laterality: Bilateral;    scrotal abscess removal       Social History     Tobacco Use    Smoking status: Former     Current packs/day: 0.00     Average packs/day: 1 pack/day for 40.0 years (40.0 ttl pk-yrs)     Types: Cigarettes     Start date: 8/15/1972     Quit date: 8/15/2012     Years since quittin.4     Passive exposure: Never    Smokeless tobacco: Never   Substance Use Topics    Alcohol use: Yes     Alcohol/week: 3.0 standard drinks of alcohol     Types: 3 Cans of beer per week     Comment: maybe 2-3  beers weekly    Drug use: No     Review of  patient's allergies indicates:   Allergen Reactions    Brilinta [ticagrelor] Itching    Lisinopril      Other reaction(s): cough    Metoprolol succinate Rash       Medications: I have reviewed the current medication administration record.    (Not in a hospital admission)      Review of Systems   Constitutional:  Negative for chills and fever.   Eyes:  Positive for visual disturbance.   Cardiovascular:  Negative for chest pain.   Gastrointestinal:  Negative for abdominal pain, nausea and vomiting.   Neurological:  Negative for speech difficulty, weakness and headaches.     Objective:     Vital Signs (Most Recent):  Temp: 98 °F (36.7 °C) (02/07/25 1656)  Pulse: 97 (02/07/25 1656)  Resp: 16 (02/07/25 1656)  BP: 137/73 (02/07/25 1656)  SpO2: 99 % (02/07/25 1656)    Vital Signs Range (Last 24H):  Temp:  [97.4 °F (36.3 °C)-98 °F (36.7 °C)]   Pulse:  []   Resp:  [16-21]   BP: (117-178)/(56-98)   SpO2:  [95 %-99 %]        Physical Exam  HENT:      Head: Normocephalic and atraumatic.      Mouth/Throat:      Mouth: Mucous membranes are moist.   Eyes:      Conjunctiva/sclera: Conjunctivae normal.   Cardiovascular:      Rate and Rhythm: Normal rate.   Pulmonary:      Effort: Pulmonary effort is normal.   Skin:     General: Skin is warm and dry.   Neurological:      Mental Status: He is alert and oriented to person, place, and time.      Motor: No weakness.   Psychiatric:         Mood and Affect: Mood normal.         Behavior: Behavior normal.              Neurological Exam:   LOC: alert  Attention Span: Good   Language: No aphasia  Articulation: No dysarthria  Orientation: Person, Place, Time   Facial Movement (CN VII): Symmetric facial expression    Motor: Arm left  Normal 5/5  Leg left  Normal 5/5  Arm right  Normal 5/5  Leg right Normal 5/5  Sensation: Intact to light touch, temperature and vibration      Laboratory:  CMP:   Recent Labs   Lab 02/07/25  1701   CALCIUM 9.4   ALBUMIN 3.4*   PROT 6.7      K 3.8   CO2  "22*      BUN 16   CREATININE 1.1   ALKPHOS 89   ALT 14   AST 15   BILITOT 0.8     CBC:   Recent Labs   Lab 02/07/25  1701   WBC 4.53   RBC 4.45*   HGB 12.4*   HCT 38.6*      MCV 87   MCH 27.9   MCHC 32.1     Lipid Panel:   Recent Labs   Lab 02/07/25  1701   CHOL 101*   LDLCALC 53.2*   HDL 35*   TRIG 64     Coagulation:   Recent Labs   Lab 02/07/25  1708   INR 1.0     Hgb A1C: No results for input(s): "HGBA1C" in the last 168 hours.  TSH:   Recent Labs   Lab 02/07/25  1701   TSH 0.316*       Diagnostic Results:      Brain imaging/Vessel imaging:     No evidence of acute hemorrhage or major vascular distribution infarct.     Patient with known small metastatic lesions, better delineated on the recent MRI.  No new worsening edema or intracranial mass effect.     CT arteriogram appears unchanged from recent study, again exhibiting modest scattered atherosclerotic calcification.  No evidence of a new high-grade stenosis or intracranial large vessel occlusion.         Cardiac Evaluation:   EKG 02/07/2025  Normal sinus rhythm, rightward axis, septal infarct age undetermined    "

## 2025-02-08 NOTE — ASSESSMENT & PLAN NOTE
Jermaine hernandez/Dr. Ponce   On Cycle 4 of Durvalumab  MRI Brain 4/3/24 demonstrated a 0.8 cm Left thalamic metastasis  MRI Brain 7/26/24 demonstrated interval increase in size to 2.2 cm; no other evidence of intracranial disease.  MRI Brain 10/7/24 demonstrated new 1.9 cm Right medial temporo-occipital metastasis.   MRI Brain 1/24/25 demonstrated a 0.4 cm posterior Right temporal lobe met   S/p Gamma Knife in 8/24, 10/24, and 1/25

## 2025-02-08 NOTE — PROGRESS NOTES
[facility] - Emergency Dept  Hematology/Oncology  Progress Note    Patient Name: Jordin Allen Jr.  Admission Date: 2/7/2025  Hospital Length of Stay: 1 days  Code Status: Full Code     Subjective:     HPI:  Jordin Allen, 80 y/o Male with PMHx of lung cancer with brain Mets, HTN, HLD, CAD, obesity, ex-smoker, presents after discharge earlier in the day due to vision loss in his left eye.      Patient discharged earlier in the day after admission for TIA concerns. He was found face down on the ground last night and was noted to have a temporary gaze deficit to the right and worsening left sided weakness. MRI brain w/wo obtained that was negative for acute CVA. Recommended to have outpatient EEG. Discharged on Decadron.      Patient returned home and was changing clothes with his daughter. Daughter reports he suddenly said that he lost his vision. Describes not being able to see his left visual field out of both eyes. LKN was 2:30pm. Denies eye pain. Denies extremity weakness or sensory changes. No speech changes. No falls. Daughter reports he has been moving slower over the last few days and needing a cane. Also reports he has been easily agitated and inpatient. She did mention an episode of confusion while driving home from the hospital today. Reports he did not know what street he was on which is unusual for him. In the ED, vascular neuro consulted who recommended repeat MRI and EEG.    Oncology Treatment Plan:   OP DURVALUMAB 1500 MG Q4W    Medications:  Continuous Infusions:  Scheduled Meds:   amitriptyline  25 mg Oral QHS    aspirin  81 mg Oral Daily    atorvastatin  80 mg Oral Daily    dexAMETHasone  4 mg Oral BID    enoxparin  40 mg Subcutaneous Q24H (prophylaxis, 1700)    fluticasone propionate  2 spray Each Nostril Daily    furosemide  20 mg Oral Daily    losartan  100 mg Oral Daily    memantine  5 mg Oral BID    pantoprazole  40 mg Oral Daily    roflumilast  1 tablet Oral Daily    traZODone  50 mg Oral  QHS     PRN Meds:  Current Facility-Administered Medications:     acetaminophen, 650 mg, Oral, Q6H PRN    albuterol, 2 puff, Inhalation, Q4H PRN    dextrose 50%, 12.5 g, Intravenous, PRN    dextrose 50%, 25 g, Intravenous, PRN    glucagon (human recombinant), 1 mg, Intramuscular, PRN    glucose, 16 g, Oral, PRN    glucose, 24 g, Oral, PRN    ibuprofen, 600 mg, Oral, Q6H PRN    iohexoL, 100 mL, Intravenous, ONCE PRN    naloxone, 0.02 mg, Intravenous, PRN    nitroGLYCERIN, 0.4 mg, Sublingual, Q5 Min PRN    sodium chloride 0.9%, 10 mL, Intravenous, Q12H PRN     Review of Systems  Objective:     Vital Signs (Most Recent):  Temp: 97.6 °F (36.4 °C) (02/08/25 1122)  Pulse: 101 (02/08/25 1122)  Resp: 18 (02/08/25 1122)  BP: 126/68 (02/08/25 1122)  SpO2: 96 % (02/08/25 1122) Vital Signs (24h Range):  Temp:  [97.3 °F (36.3 °C)-98 °F (36.7 °C)] 97.6 °F (36.4 °C)  Pulse:  [] 101  Resp:  [16-20] 18  SpO2:  [96 %-99 %] 96 %  BP: (116-178)/(65-93) 126/68     Weight: 85.7 kg (188 lb 15 oz)  Body mass index is 27.9 kg/m².  Body surface area is 2.04 meters squared.    No intake or output data in the 24 hours ending 02/08/25 1137     Physical Exam  Vitals reviewed.   Constitutional:       Appearance: Normal appearance.   HENT:      Head: Normocephalic and atraumatic.      Mouth/Throat:      Pharynx: Oropharynx is clear.   Eyes:      General: No scleral icterus.     Conjunctiva/sclera: Conjunctivae normal.   Cardiovascular:      Rate and Rhythm: Normal rate and regular rhythm.      Pulses: Normal pulses.      Heart sounds: Normal heart sounds.   Pulmonary:      Effort: Pulmonary effort is normal. No respiratory distress.   Abdominal:      General: Abdomen is flat.      Tenderness: There is no abdominal tenderness. There is no guarding or rebound.   Skin:     Coloration: Skin is not jaundiced.   Neurological:      General: No focal deficit present.      Mental Status: He is alert and oriented to person, place, and time.    Psychiatric:         Mood and Affect: Mood normal.         Behavior: Behavior normal.          Significant Labs:   All pertinent labs from the last 24 hours have been reviewed.    Diagnostic Results:  I have reviewed and interpreted all pertinent imaging results/findings within the past 24 hours.  Assessment/Plan:     * Unspecified visual disturbance  78 y/o M with PMHx of lung adenocarcinoma w/mets to brain s/p GK x 3, previously on durvalumab but stopped given recent multiple episodes of pneumonitis discharged earlier today after admission for TIA. Patient returns with reports of vision disturbance, headache, and walking difficulty. Per ED, patient reports not being able to see left visual field out of both eyes, confusion, headaches, and needing to use cane to get around. HDS. Patient reports overall vision blurriness. On examination, noted issues with tracking and convergence, no overly convincing visual field defects noted. Initial and repeat CTA stroke no evidence of acute hemorrhage or vascular distribution. Repeat MRI Brain with new T2 FLAIR signal abnormality in the subarachnoid space overlying the posterior right temporal and right occipital lobes. Ddx noninfectious vs noninfectious vs subarachnoid hemorrhage vs Leptomeningeal metastatic. Overall impression likely related related to know metastatic lesion vs side effects of GK    Plan:   - Neurology consulted, appreciate recs  - Neuro checks q 4 hours  - Continue dexamethasone  - Spoke with Rad Onc, will start Memantine       TIA (transient ischemic attack)    Plan:   - Continue home ASA + statin     Metastasis to brain  Follows w/Dr. Ponce   On Cycle 4 of Durvalumab  MRI Brain 4/3/24 demonstrated a 0.8 cm Left thalamic metastasis  MRI Brain 7/26/24 demonstrated interval increase in size to 2.2 cm; no other evidence of intracranial disease.  MRI Brain 10/7/24 demonstrated new 1.9 cm Right medial temporo-occipital metastasis.   MRI Brain 1/24/25  demonstrated a 0.4 cm posterior Right temporal lobe met   S/p Gamma Knife in 8/24, 10/24, and 1/25    Adenocarcinoma, lung, left  Follows w/Dr. Ponce   On Cycle 4 of Durvalumab  Mets to brain     CAD (coronary artery disease)    Plan:   Continue home ASA + statin     HTN (hypertension), benign    Plan:   - Continue home losartan     GERD (gastroesophageal reflux disease)    Plan:   - Continue home protonix     Centrilobular emphysema    Plan:   - Continue home roflumilast   - PRN albuterol HFA             Mawadah Samad, MD  Hematology/Oncology  [facility] Emergency Dept

## 2025-02-08 NOTE — H&P
[facility]  [facility]  History & Physical    Patient Name: Jordin Allen Jr.  MRN: [id number]  Admission Date: 2/7/2025  Attending Physician: Sharla Rodríguez MD   Primary Care Provider: Sierra Landry MD         Patient information was obtained from patient and ER records.       Subjective:     Principal Problem:Unspecified visual disturbance    Chief Complaint:   Chief Complaint   Patient presents with    Loss of Vision     Vision lost on left eye after he was discharge around 2:30pm today.        HPI: 78 y/o Male with PMHx of lung cancer with brain Mets, HTN, HLD, CAD, obesity, ex-smoker, presents after discharge earlier in the day due to vision loss in his left eye.     Patient discharged earlier in the day after admission for TIA concerns. He was found face down on the ground last night and was noted to have a temporary gaze deficit to the right and worsening left sided weakness. MRI brain w/wo obtained that was negative for acute CVA. Recommended to have outpatient EEG. Discharged on Decadron.      Patient returned home and was changing clothes with his daughter. Daughter reports he suddenly said that he lost his vision. Describes not being able to see his left visual field out of both eyes. LKN was 2:30pm. Denies eye pain. Denies extremity weakness or sensory changes. No speech changes. No falls. Daughter reports he has been moving slower over the last few days and needing a cane. Also reports he has been easily agitated and inpatient. She did mention an episode of confusion while driving home from the hospital today. Reports he did not know what street he was on which is unusual for him. In the ED, vascular neuro consulted who recommended repeat MRI and EEG.        Past Medical History:   Diagnosis Date    Benign neoplasm of colon 10/01/2013    Bronchitis chronic     CAD (coronary artery disease) 10/31/2013    COPD (chronic obstructive pulmonary disease)     Emphysema of lung      Encounter for preventive health examination 3/21/2018    GERD (gastroesophageal reflux disease)     Hx of colonic polyps     Hypertension     NAFLD (nonalcoholic fatty liver disease) 03/27/2017    SHOLA on CPAP     RLS (restless legs syndrome)     Screen for colon cancer 08/30/2013       Past Surgical History:   Procedure Laterality Date    bilateral foot surgery  1993    CARDIAC CATHETERIZATION  2013    x1    CATARACT EXTRACTION, BILATERAL      COLON SURGERY  2013    Ace Mott MD    COLONOSCOPY N/A 3/21/2018    Procedure: COLONOSCOPY;  Surgeon: Terry Mott MD;  Location: [facility] ENDO (4TH FLR);  Service: Endoscopy;  Laterality: N/A;    COLONOSCOPY N/A 4/12/2019    Procedure: COLONOSCOPY;  Surgeon: John Mantilla MD;  Location: [facility] ENDO (4TH FLR);  Service: Endoscopy;  Laterality: N/A;    COLONOSCOPY N/A 5/31/2022    Procedure: COLONOSCOPY;  Surgeon: MEDINA Farris MD;  Location: [facility] ENDO (4TH FLR);  Service: Endoscopy;  Laterality: N/A;  fully vacc-inst portal-tb    ENDOBRONCHIAL ULTRASOUND Bilateral 4/30/2024    Procedure: ENDOBRONCHIAL ULTRASOUND (EBUS);  Surgeon: Naveed Aguero MD;  Location: [facility] OR;  Service: Pulmonary;  Laterality: Bilateral;    scrotal abscess removal         Review of patient's allergies indicates:   Allergen Reactions    Brilinta [ticagrelor] Itching    Lisinopril      Other reaction(s): cough    Metoprolol succinate Rash       Current Facility-Administered Medications on File Prior to Encounter   Medication    dextrose 50% injection 12.5 g    dextrose 50% injection 25 g    insulin regular injection 0-8 Units    [COMPLETED] potassium chloride SA CR tablet 40 mEq    [DISCONTINUED] acetaminophen tablet 650 mg    [DISCONTINUED] albuterol inhaler 2 puff    [DISCONTINUED] albuterol nebulizer solution 0.63 mg    [DISCONTINUED] amitriptyline tablet 25 mg    [DISCONTINUED] aspirin EC tablet 81 mg    [DISCONTINUED] atorvastatin tablet 80 mg    [DISCONTINUED] dexAMETHasone tablet 4 mg     [DISCONTINUED] dextrose 50% injection 12.5 g    [DISCONTINUED] dextrose 50% injection 25 g    [DISCONTINUED] enoxaparin injection 40 mg    [DISCONTINUED] fluticasone propionate 50 mcg/actuation nasal spray 100 mcg    [DISCONTINUED] furosemide tablet 20 mg    [DISCONTINUED] glucagon (human recombinant) injection 1 mg    [DISCONTINUED] glucose chewable tablet 16 g    [DISCONTINUED] glucose chewable tablet 24 g    [DISCONTINUED] losartan tablet 100 mg    [DISCONTINUED] naloxone 0.4 mg/mL injection 0.02 mg    [DISCONTINUED] nitroGLYCERIN SL tablet 0.4 mg    [DISCONTINUED] pantoprazole EC tablet 40 mg    [DISCONTINUED] roflumilast (DALIRESP) tablet 500 mcg    [DISCONTINUED] sodium chloride 0.9% flush 10 mL    [DISCONTINUED] traZODone tablet 50 mg     Current Outpatient Medications on File Prior to Encounter   Medication Sig    albuterol (ACCUNEB) 0.63 mg/3 mL Nebu Inhale 3 mLs (0.63 mg total) by nebulization every 6 (six) hours as needed (if symptoms failed to improve with inhaler). Rescue    albuterol (PROVENTIL/VENTOLIN HFA) 90 mcg/actuation inhaler Inhale 2 puffs into the lungs every 4 (four) hours as needed for Wheezing.    amitriptyline (ELAVIL) 25 MG tablet Take 1 tablet (25 mg total) by mouth once daily.    aspirin (ECOTRIN) 81 MG EC tablet Take 81 mg by mouth once daily.     atorvastatin (LIPITOR) 80 MG tablet TAKE 1 TABLET (80 MG TOTAL) BY MOUTH ONCE DAILY.    BETA-CAROTENE,A, W-C & E/MIN (OCUVITE ORAL) Take 1 capsule by mouth once daily.     budesonide-glycopyr-formoterol (BREZTRI AEROSPHERE) 160-9-4.8 mcg/actuation HFAA Inhale 2 puffs into the lungs 2 (two) times a day.    dexAMETHasone (DECADRON) 4 MG Tab Take 1 tablet (4 mg total) by mouth 2 (two) times daily.    echinacea 400 mg Cap Take 1 capsule by mouth once daily.     fluticasone propionate (FLONASE) 50 mcg/actuation nasal spray Spray 2 sprays (100 mcg total) in Each Nostril once daily.    furosemide (LASIX) 20 MG tablet Take 1 tablet (20 mg total)  by mouth once daily.    latanoprost 0.005 % ophthalmic solution Place 1 drop into both eyes once daily.     latanoprost 0.005 % ophthalmic solution Instill 1 drop into both eyes every night at bedtime    losartan (COZAAR) 100 MG tablet Take 1 tablet (100 mg total) by mouth once daily.    nitroGLYCERIN (NITROSTAT) 0.4 MG SL tablet Place 1 tablet (0.4 mg total) under the tongue every 5 (five) minutes as needed.    omeprazole (PRILOSEC) 20 MG capsule Take 1 capsule (20 mg total) by mouth once daily.    roflumilast (DALIRESP) 500 mcg Tab Take 1 tablet (500 mcg total) by mouth once daily.    senna (SENOKOT) 8.6 mg tablet Take 1 tablet by mouth once daily.    traZODone (DESYREL) 50 MG tablet Take 1 tablet (50 mg total) by mouth every evening.     Family History       Problem Relation (Age of Onset)    Heart attack Mother    Heart disease Mother    Hypertension Mother    No Known Problems Sister, Daughter          Tobacco Use    Smoking status: Former     Current packs/day: 0.00     Average packs/day: 1 pack/day for 40.0 years (40.0 ttl pk-yrs)     Types: Cigarettes     Start date: 8/15/1972     Quit date: 8/15/2012     Years since quittin.4     Passive exposure: Never    Smokeless tobacco: Never   Substance and Sexual Activity    Alcohol use: Yes     Alcohol/week: 3.0 standard drinks of alcohol     Types: 3 Cans of beer per week     Comment: maybe 2-3  beers weekly    Drug use: No    Sexual activity: Yes     Partners: Female     Review of Systems   Eyes:  Positive for visual disturbance.   Neurological:  Positive for weakness.     Objective:     Vital Signs (Most Recent):  Temp: 97.3 °F (36.3 °C) (25)  Pulse: 92 (25)  Resp: 20 (25)  BP: (!) 143/88 (25)  SpO2: 98 % (25) Vital Signs (24h Range):  Temp:  [97.3 °F (36.3 °C)-98 °F (36.7 °C)] 97.3 °F (36.3 °C)  Pulse:  [] 92  Resp:  [16-20] 20  SpO2:  [97 %-99 %] 98 %  BP: (116-178)/(62-93) 143/88     Weight:  85.7 kg (188 lb 15 oz)  Body mass index is 27.9 kg/m².     Physical Exam  Vitals reviewed.   Constitutional:       Appearance: Normal appearance.   HENT:      Head: Normocephalic and atraumatic.   Eyes:      General: No scleral icterus.     Conjunctiva/sclera: Conjunctivae normal.   Pulmonary:      Effort: Pulmonary effort is normal. No respiratory distress.   Skin:     Coloration: Skin is not jaundiced.      Findings: No erythema.   Neurological:      General: No focal deficit present.      Mental Status: He is alert and oriented to person, place, and time.   Psychiatric:         Mood and Affect: Mood normal.         Behavior: Behavior normal.                Significant Labs: All pertinent labs within the past 24 hours have been reviewed.    Significant Imaging: I have reviewed all pertinent imaging results/findings within the past 24 hours.  Assessment/Plan:     * Unspecified visual disturbance  Patient presented to the ED again recent discharge for TIA concerns. He was found face down on the ground last night and was noted to have a temporary gaze deficit to the right and worsening left sided weakness. MRI brain w/wo obtained that was negative for acute CVA. Recommended to have outpatient EEG. Discharged on Decadron.     Repeat MRI  Repeat EEG  Continue dexamethasone  Consider formal neuro consult  Neuro checks           Metastasis to brain        Adenocarcinoma, lung, left  Follows as outpatient, Stage IV with mets to the brain      CAD (coronary artery disease)  Continue aspirin and statin      HTN (hypertension), benign  Continue home meds        VTE Risk Mitigation (From admission, onward)           Ordered     enoxaparin injection 40 mg  Every 24 hours         02/07/25 1923     Place sequential compression device  Until discontinued         02/07/25 1923                         The attending portion of this evaluation, treatment, and documentation was performed per Zahida Garcia MD via Telemedicine  AudioVisual using the secure [employer] software platform with 2 way audio/video. The provider was located off-site and the patient is located in the hospital. The aforementioned video software was utilized to document the relevant history and physical exam            Zahida Garcia MD  Department of Hospital Medicine   [facility] - Emergency Dept

## 2025-02-08 NOTE — SUBJECTIVE & OBJECTIVE
Past Medical History:   Diagnosis Date    Benign neoplasm of colon 10/01/2013    Bronchitis chronic     CAD (coronary artery disease) 10/31/2013    COPD (chronic obstructive pulmonary disease)     Emphysema of lung     Encounter for preventive health examination 3/21/2018    GERD (gastroesophageal reflux disease)     Hx of colonic polyps     Hypertension     NAFLD (nonalcoholic fatty liver disease) 03/27/2017    SHOLA on CPAP     RLS (restless legs syndrome)     Screen for colon cancer 08/30/2013       Past Surgical History:   Procedure Laterality Date    bilateral foot surgery  1993    CARDIAC CATHETERIZATION  2013    x1    CATARACT EXTRACTION, BILATERAL      COLON SURGERY  2013    Ace Mott MD    COLONOSCOPY N/A 3/21/2018    Procedure: COLONOSCOPY;  Surgeon: Terry Mott MD;  Location: [facility] (4TH FLR);  Service: Endoscopy;  Laterality: N/A;    COLONOSCOPY N/A 4/12/2019    Procedure: COLONOSCOPY;  Surgeon: John Mantilla MD;  Location: [facility] ENDO (4TH FLR);  Service: Endoscopy;  Laterality: N/A;    COLONOSCOPY N/A 5/31/2022    Procedure: COLONOSCOPY;  Surgeon: MEDINA Farris MD;  [facility]  Service: Endoscopy;  Laterality: N/A;  fully vacc-inst portal-tb    ENDOBRONCHIAL ULTRASOUND Bilateral 4/30/2024    Procedure: ENDOBRONCHIAL ULTRASOUND (EBUS);  Surgeon: Naveed Aguero MD;  Location: [facility] OR;  Service: Pulmonary;  Laterality: Bilateral;    scrotal abscess removal         Review of patient's allergies indicates:   Allergen Reactions    Brilinta [ticagrelor] Itching    Lisinopril      Other reaction(s): cough    Metoprolol succinate Rash       Current Facility-Administered Medications on File Prior to Encounter   Medication    dextrose 50% injection 12.5 g    dextrose 50% injection 25 g    insulin regular injection 0-8 Units    [COMPLETED] potassium chloride SA CR tablet 40 mEq    [DISCONTINUED] acetaminophen tablet 650 mg    [DISCONTINUED] albuterol inhaler 2 puff     [DISCONTINUED] albuterol nebulizer solution 0.63 mg    [DISCONTINUED] amitriptyline tablet 25 mg    [DISCONTINUED] aspirin EC tablet 81 mg    [DISCONTINUED] atorvastatin tablet 80 mg    [DISCONTINUED] dexAMETHasone tablet 4 mg    [DISCONTINUED] dextrose 50% injection 12.5 g    [DISCONTINUED] dextrose 50% injection 25 g    [DISCONTINUED] enoxaparin injection 40 mg    [DISCONTINUED] fluticasone propionate 50 mcg/actuation nasal spray 100 mcg    [DISCONTINUED] furosemide tablet 20 mg    [DISCONTINUED] glucagon (human recombinant) injection 1 mg    [DISCONTINUED] glucose chewable tablet 16 g    [DISCONTINUED] glucose chewable tablet 24 g    [DISCONTINUED] losartan tablet 100 mg    [DISCONTINUED] naloxone 0.4 mg/mL injection 0.02 mg    [DISCONTINUED] nitroGLYCERIN SL tablet 0.4 mg    [DISCONTINUED] pantoprazole EC tablet 40 mg    [DISCONTINUED] roflumilast (DALIRESP) tablet 500 mcg    [DISCONTINUED] sodium chloride 0.9% flush 10 mL    [DISCONTINUED] traZODone tablet 50 mg     Current Outpatient Medications on File Prior to Encounter   Medication Sig    albuterol (ACCUNEB) 0.63 mg/3 mL Nebu Inhale 3 mLs (0.63 mg total) by nebulization every 6 (six) hours as needed (if symptoms failed to improve with inhaler). Rescue    albuterol (PROVENTIL/VENTOLIN HFA) 90 mcg/actuation inhaler Inhale 2 puffs into the lungs every 4 (four) hours as needed for Wheezing.    amitriptyline (ELAVIL) 25 MG tablet Take 1 tablet (25 mg total) by mouth once daily.    aspirin (ECOTRIN) 81 MG EC tablet Take 81 mg by mouth once daily.     atorvastatin (LIPITOR) 80 MG tablet TAKE 1 TABLET (80 MG TOTAL) BY MOUTH ONCE DAILY.    BETA-CAROTENE,A, W-C & E/MIN (OCUVITE ORAL) Take 1 capsule by mouth once daily.     budesonide-glycopyr-formoterol (BREZTRI AEROSPHERE) 160-9-4.8 mcg/actuation HFAA Inhale 2 puffs into the lungs 2 (two) times a day.    dexAMETHasone (DECADRON) 4 MG Tab Take 1 tablet (4 mg total) by mouth 2 (two) times daily.    echinacea 400 mg  Cap Take 1 capsule by mouth once daily.     fluticasone propionate (FLONASE) 50 mcg/actuation nasal spray Spray 2 sprays (100 mcg total) in Each Nostril once daily.    furosemide (LASIX) 20 MG tablet Take 1 tablet (20 mg total) by mouth once daily.    latanoprost 0.005 % ophthalmic solution Place 1 drop into both eyes once daily.     latanoprost 0.005 % ophthalmic solution Instill 1 drop into both eyes every night at bedtime    losartan (COZAAR) 100 MG tablet Take 1 tablet (100 mg total) by mouth once daily.    nitroGLYCERIN (NITROSTAT) 0.4 MG SL tablet Place 1 tablet (0.4 mg total) under the tongue every 5 (five) minutes as needed.    omeprazole (PRILOSEC) 20 MG capsule Take 1 capsule (20 mg total) by mouth once daily.    roflumilast (DALIRESP) 500 mcg Tab Take 1 tablet (500 mcg total) by mouth once daily.    senna (SENOKOT) 8.6 mg tablet Take 1 tablet by mouth once daily.    traZODone (DESYREL) 50 MG tablet Take 1 tablet (50 mg total) by mouth every evening.     Family History       Problem Relation (Age of Onset)    Heart attack Mother    Heart disease Mother    Hypertension Mother    No Known Problems Sister, Daughter          Tobacco Use    Smoking status: Former     Current packs/day: 0.00     Average packs/day: 1 pack/day for 40.0 years (40.0 ttl pk-yrs)     Types: Cigarettes     Start date: 8/15/1972     Quit date: 8/15/2012     Years since quittin.4     Passive exposure: Never    Smokeless tobacco: Never   Substance and Sexual Activity    Alcohol use: Yes     Alcohol/week: 3.0 standard drinks of alcohol     Types: 3 Cans of beer per week     Comment: maybe 2-3  beers weekly    Drug use: No    Sexual activity: Yes     Partners: Female     Review of Systems   Eyes:  Positive for visual disturbance.   Neurological:  Positive for weakness.     Objective:     Vital Signs (Most Recent):  Temp: 97.3 °F (36.3 °C) (25 0710)  Pulse: 92 (25 0710)  Resp: 20 (25 0710)  BP: (!) 143/88 (25  0710)  SpO2: 98 % (02/08/25 0710) Vital Signs (24h Range):  Temp:  [97.3 °F (36.3 °C)-98 °F (36.7 °C)] 97.3 °F (36.3 °C)  Pulse:  [] 92  Resp:  [16-20] 20  SpO2:  [97 %-99 %] 98 %  BP: (116-178)/(62-93) 143/88     Weight: 85.7 kg (188 lb 15 oz)  Body mass index is 27.9 kg/m².     Physical Exam  Vitals reviewed.   Constitutional:       Appearance: Normal appearance.   HENT:      Head: Normocephalic and atraumatic.   Eyes:      General: No scleral icterus.     Conjunctiva/sclera: Conjunctivae normal.   Pulmonary:      Effort: Pulmonary effort is normal. No respiratory distress.   Skin:     Coloration: Skin is not jaundiced.      Findings: No erythema.   Neurological:      General: No focal deficit present.      Mental Status: He is alert and oriented to person, place, and time.   Psychiatric:         Mood and Affect: Mood normal.         Behavior: Behavior normal.                Significant Labs: All pertinent labs within the past 24 hours have been reviewed.    Significant Imaging: I have reviewed all pertinent imaging results/findings within the past 24 hours.

## 2025-02-08 NOTE — ED NOTES
LOC: The patient is awake, alert, and oriented to self, place, time, and situation. Pt is anxious but cooperative. Speech is appropriate     APPEARANCE: Patient resting comfortably in no acute distress.  Patient is clean and well groomed.    SKIN: The skin is warm and dry; color consistent with ethnicity.  Patient has normal skin turgor and moist mucus membranes.  Skin intact. Bruising noted to left hand and wrist.    MUSCULOSKELETAL: Patient moving upper and lower extremities without difficulty; denies pain in the extremities or back.  Pt states his gait is weak.  MD to order PT and walker for mobility.     RESPIRATORY: Airway is open and patent. Respirations spontaneous,  and non-labored.  Patient has a normal effort and rate.  No accessory muscle use noted. Denies cough.  Oxygen removed per MD order.     CARDIAC:  Normal heart rate noted.  No peripheral edema noted. No complaints of chest pain.  Cardiac monitor DC per MD order.     ABDOMEN: Soft and non tender to palpation.  No distention noted. Pt denies abdominal pain; denies nausea, vomiting, diarrhea, or constipation.    NEUROLOGIC: Eyes open spontaneously.    Follows commands; facial expression symmetrical.  Purposeful motor response noted; normal sensation in all extremities. Pt denies headache; denies lightheadedness or dizziness; continues to report  visual disturbances; loss of balance; denies unilateral weakness. Neuro assessment completed.

## 2025-02-08 NOTE — CONSULTS
[facility] - Emergency Dept  Vascular Neurology  Comprehensive Stroke Center  Consult Note    Inpatient consult to Neurology Services (Vascular Neurology)  Consult performed by: Kristie Ruffin PA-C  Consult ordered by: Jonathan Xavier MD  Reason for consult: stroke code        Assessment/Plan:     Patient is a 79 y.o. year old male with:    Unspecified visual disturbance  Jordin Allen Jr. is a 79 y.o. male with PMH of lung cancer with brain mets,  HTN, HLD, CAD that presents to the ED c/o vision loss. Patient was seen in the ED overnight after an unwitnessed fall. He was found face down at 11:30pm 02/06/2025. EMS noted that he had a right gaze preference and left- sided weakness. On arrival to ED, stroke code activated. CT MP and MRI obtained showed no acute intracranial abnormality. EEG was recommended but patient left the ED before an EEG could be done. This afternoon, patient returned to the ED because as his daughter was driving him home, the patient began stating that he could not see out of his left eye. Nurse reported that daughter was told to return to the ED by outside provider. Exam significant for complete left hemianopsia .NIHSS 3. CTH/CTA stroke multiphase negative for acute abnormality. Determined not a candidate for acute stroke interventions, OOW for TNK and no LVO for thrombectomy. Etiology of presentation unclear a this time.    Recommendations  - Etiology of presentation unclear, work-up ongoing per ED team. Low clinical suspicion for stoke due presentation of R MCA syndrome without LVO or diffusion restriction on imaging. If patient not returned to baseline, can get MRI.  -Obtain EEG, consider seizure medication  -Avoid hypotension, MAP>65  -Avoid hypoglycemia -180  - VN will sign- off. If MRI is obtained with findings concerning for stoke, contact VN for further eval and recs.      Thank you for involving us in the care of this patient.        STROKE DOCUMENTATION     Acute Stroke Times    Last Known Normal Date: 02/06/25  Last Known Normal Time: 2310  Symptom Onset Date: 02/06/25  Unknown Symptom Onset Time: Unknown Time  Stroke Team Called Date: 02/07/25  Stroke Team Called Time: 1635  Stroke Team Arrival Date: 02/07/25  Stroke Team Arrival Time: 1639  CT Interpretation Time: 1700  Thrombolytic Therapy Recommended: No  CTA Interpretation Time: 1700  Thrombectomy Recommended: No    NIH Scale:  Interval: baseline  1a. Level of Consciousness: 0-->Alert, keenly responsive  1b. LOC Questions: 0-->Answers both questions correctly  1c. LOC Commands: 0-->Performs both tasks correctly  2. Best Gaze: 1-->Partial gaze palsy, gaze is abnormal in one or both eyes, but forced deviation or total gaze paresis is not present  3. Visual: 2-->Complete hemianopia  4. Facial Palsy: 0-->Normal symmetrical movements  5a. Motor Arm, Left: 0-->No drift, limb holds 90 (or 45) degrees for full 10 secs  5b. Motor Arm, Right: 0-->No drift, limb holds 90 (or 45) degrees for full 10 secs  6a. Motor Leg, Left: 0-->No drift, leg holds 30 degree position for full 5 secs  6b. Motor Leg, Right: 0-->No drift, leg holds 30 degree position for full 5 secs  7. Limb Ataxia: 0-->Absent  8. Sensory: 0-->Normal, no sensory loss  9. Best Language: 0-->No aphasia, normal  10. Dysarthria: 0-->Normal  11. Extinction and Inattention (formerly Neglect): 0-->No abnormality  Total (NIH Stroke Scale): 3    Modified Deb Score: 1  Rosales Coma Scale:    ABCD2 Score:    [id number] Score:   HAS -BLED Score:   ICH Score:   Hunt & Haider Classification:       Thrombolysis Candidate? No, Out of window - Symptom onset > 4.5 hours, Intracranial neoplasm    Delays to Thrombolysis?  Not Applicable    Interventional Revascularization Candidate?   Is the patient eligible for mechanical endovascular reperfusion (ZELALEM)?  No; No large vessel occlusion identified on imaging     Delays to Thrombectomy? Not Applicable    Hemorrhagic change of an Ischemic Stroke:  Does this patient have an ischemic stroke with hemorrhagic changes? No     Subjective:     History of Present Illness:  Jordin Allen Jr. is a 79 y.o. male with PMH of lung cancer with brain mets,  HTN, HLD, CAD that presents to the ED c/o vision loss. Patient was seen in the ED overnight after a n unwitnessed fall. He was found face down at 11:30pm 02/06/2025. EMS noted that he had a right gaze preference and left- sided weakness. On arrival to ED, stroke code activated. CT MP and MRI obtained showed no acute intracranial abnormality. EEG was recommended but patient left the ED before an EEG could be done. This afternoon, patient returned to the ED because as his daughter was driving him home, the patient began stating that he could not see out of his left eye. Nurse reported that daughter was told to return to the ED by outside provider. Exam significant for complete left hemianopsia .NIHSS 3. CTH/CTA stroke multiphase negative for acute abnormality. Determined not a candidate for acute stroke interventions, OOW for TNK and no LVO for thrombectomy. Etiology of presentation unclear a this time.          Past Medical History:   Diagnosis Date    Benign neoplasm of colon 10/01/2013    Bronchitis chronic     CAD (coronary artery disease) 10/31/2013    COPD (chronic obstructive pulmonary disease)     Emphysema of lung     Encounter for preventive health examination 3/21/2018    GERD (gastroesophageal reflux disease)     Hx of colonic polyps     Hypertension     NAFLD (nonalcoholic fatty liver disease) 03/27/2017    SHOLA on CPAP     RLS (restless legs syndrome)     Screen for colon cancer 08/30/2013     Past Surgical History:   Procedure Laterality Date    bilateral foot surgery  1993    CARDIAC CATHETERIZATION  2013    x1    CATARACT EXTRACTION, BILATERAL      COLON SURGERY  2013    Ace Mott MD    COLONOSCOPY N/A 3/21/2018    Procedure: COLONOSCOPY;  Surgeon: Terry Mott MD;  Location: [facility] ([address] [place] [address]);   Service: Endoscopy;  Laterality: N/A;    COLONOSCOPY N/A 2019    Procedure: COLONOSCOPY;  Surgeon: John Mantilla MD;  Location: [facility] (4TH FLR);  Service: Endoscopy;  Laterality: N/A;    COLONOSCOPY N/A 2022    Procedure: COLONOSCOPY;  Surgeon: MEDINA Farris MD;  Location: [facility] ENDO (4TH FLR);  Service: Endoscopy;  Laterality: N/A;  fully vacc-inst portal-tb    ENDOBRONCHIAL ULTRASOUND Bilateral 2024    Procedure: ENDOBRONCHIAL ULTRASOUND (EBUS);  Surgeon: Naveed Aguero MD;  Location: [facility] OR;  Service: Pulmonary;  Laterality: Bilateral;    scrotal abscess removal       Social History     Tobacco Use    Smoking status: Former     Current packs/day: 0.00     Average packs/day: 1 pack/day for 40.0 years (40.0 ttl pk-yrs)     Types: Cigarettes     Start date: 8/15/1972     Quit date: 8/15/2012     Years since quittin.4     Passive exposure: Never    Smokeless tobacco: Never   Substance Use Topics    Alcohol use: Yes     Alcohol/week: 3.0 standard drinks of alcohol     Types: 3 Cans of beer per week     Comment: maybe 2-3  beers weekly    Drug use: No     Review of patient's allergies indicates:   Allergen Reactions    Brilinta [ticagrelor] Itching    Lisinopril      Other reaction(s): cough    Metoprolol succinate Rash       Medications: I have reviewed the current medication administration record.    (Not in a hospital admission)      Review of Systems   Constitutional:  Negative for chills and fever.   Eyes:  Positive for visual disturbance.   Cardiovascular:  Negative for chest pain.   Gastrointestinal:  Negative for abdominal pain, nausea and vomiting.   Neurological:  Negative for speech difficulty, weakness and headaches.     Objective:     Vital Signs (Most Recent):  Temp: 98 °F (36.7 °C) (25)  Pulse: 97 (25)  Resp: 16 (25)  BP: 137/73 (25)  SpO2: 99 % (25)    Vital Signs Range (Last 24H):  Temp:  [97.4 °F (36.3 °C)-98 °F  "(36.7 °C)]   Pulse:  []   Resp:  [16-21]   BP: (117-178)/(56-98)   SpO2:  [95 %-99 %]        Physical Exam  HENT:      Head: Normocephalic and atraumatic.      Mouth/Throat:      Mouth: Mucous membranes are moist.   Eyes:      Conjunctiva/sclera: Conjunctivae normal.   Cardiovascular:      Rate and Rhythm: Normal rate.   Pulmonary:      Effort: Pulmonary effort is normal.   Skin:     General: Skin is warm and dry.   Neurological:      Mental Status: He is alert and oriented to person, place, and time.      Motor: No weakness.   Psychiatric:         Mood and Affect: Mood normal.         Behavior: Behavior normal.              Neurological Exam:   LOC: alert  Attention Span: Good   Language: No aphasia  Articulation: No dysarthria  Orientation: Person, Place, Time   Facial Movement (CN VII): Symmetric facial expression    Motor: Arm left  Normal 5/5  Leg left  Normal 5/5  Arm right  Normal 5/5  Leg right Normal 5/5  Sensation: Intact to light touch, temperature and vibration      Laboratory:  CMP:   Recent Labs   Lab 02/07/25  1701   CALCIUM 9.4   ALBUMIN 3.4*   PROT 6.7      K 3.8   CO2 22*      BUN 16   CREATININE 1.1   ALKPHOS 89   ALT 14   AST 15   BILITOT 0.8     CBC:   Recent Labs   Lab 02/07/25  1701   WBC 4.53   RBC 4.45*   HGB 12.4*   HCT 38.6*      MCV 87   MCH 27.9   MCHC 32.1     Lipid Panel:   Recent Labs   Lab 02/07/25  1701   CHOL 101*   LDLCALC 53.2*   HDL 35*   TRIG 64     Coagulation:   Recent Labs   Lab 02/07/25  1708   INR 1.0     Hgb A1C: No results for input(s): "HGBA1C" in the last 168 hours.  TSH:   Recent Labs   Lab 02/07/25  1701   TSH 0.316*       Diagnostic Results:      Brain imaging/Vessel imaging:     No evidence of acute hemorrhage or major vascular distribution infarct.     Patient with known small metastatic lesions, better delineated on the recent MRI.  No new worsening edema or intracranial mass effect.     CT arteriogram appears unchanged from recent study, " again exhibiting modest scattered atherosclerotic calcification.  No evidence of a new high-grade stenosis or intracranial large vessel occlusion.         Cardiac Evaluation:   EKG 02/07/2025  Normal sinus rhythm, rightward axis, septal infarct age undetermined      Kristie Ruffin PA-C  [facility]  Department of Vascular Neurology   [facility] - Emergency Dept

## 2025-02-08 NOTE — H&P
[facility] - Emergency Dept  Hematology/Oncology  H&P    Patient Name: Jordin Allen Jr.  MRN: [id number]  Admission Date: 2/7/2025  Code Status: Full Code   Attending Provider: Sharla Rodríguez MD  Primary Care Physician: Sierra Landry MD  Principal Problem:Unspecified visual disturbance    Subjective:     HPI: Jordin Allen, 78 y/o Male with PMHx of lung cancer with brain Mets, HTN, HLD, CAD, obesity, ex-smoker, presents after discharge earlier in the day due to vision loss in his left eye.      Patient discharged earlier in the day after admission for TIA concerns. He was found face down on the ground last night and was noted to have a temporary gaze deficit to the right and worsening left sided weakness. MRI brain w/wo obtained that was negative for acute CVA. Recommended to have outpatient EEG. Discharged on Decadron.      Patient returned home and was changing clothes with his daughter. Daughter reports he suddenly said that he lost his vision. Describes not being able to see his left visual field out of both eyes. LKN was 2:30pm. Denies eye pain. Denies extremity weakness or sensory changes. No speech changes. No falls. Daughter reports he has been moving slower over the last few days and needing a cane. Also reports he has been easily agitated and inpatient. She did mention an episode of confusion while driving home from the hospital today. Reports he did not know what street he was on which is unusual for him. In the ED, vascular neuro consulted who recommended repeat MRI and EEG.     Oncology Treatment Plan:   OP DURVALUMAB 1500 MG Q4W    Medications:  Continuous Infusions:  Scheduled Meds:   amitriptyline  25 mg Oral QHS    aspirin  81 mg Oral Daily    atorvastatin  80 mg Oral Daily    dexAMETHasone  4 mg Oral BID    enoxparin  40 mg Subcutaneous Q24H (prophylaxis, 1700)    fluticasone propionate  2 spray Each Nostril Daily    furosemide  20 mg Oral Daily    losartan  100 mg Oral Daily     memantine  5 mg Oral BID    pantoprazole  40 mg Oral Daily    roflumilast  1 tablet Oral Daily    traZODone  50 mg Oral QHS     PRN Meds:  Current Facility-Administered Medications:     acetaminophen, 650 mg, Oral, Q6H PRN    albuterol, 2 puff, Inhalation, Q4H PRN    dextrose 50%, 12.5 g, Intravenous, PRN    dextrose 50%, 25 g, Intravenous, PRN    glucagon (human recombinant), 1 mg, Intramuscular, PRN    glucose, 16 g, Oral, PRN    glucose, 24 g, Oral, PRN    ibuprofen, 600 mg, Oral, Q6H PRN    iohexoL, 100 mL, Intravenous, ONCE PRN    naloxone, 0.02 mg, Intravenous, PRN    nitroGLYCERIN, 0.4 mg, Sublingual, Q5 Min PRN    sodium chloride 0.9%, 10 mL, Intravenous, Q12H PRN     Review of Systems  Objective:     Vital Signs (Most Recent):  Temp: 97.6 °F (36.4 °C) (02/08/25 1122)  Pulse: 101 (02/08/25 1122)  Resp: 18 (02/08/25 1122)  BP: 126/68 (02/08/25 1122)  SpO2: 96 % (02/08/25 1122) Vital Signs (24h Range):  Temp:  [97.3 °F (36.3 °C)-98 °F (36.7 °C)] 97.6 °F (36.4 °C)  Pulse:  [] 101  Resp:  [16-20] 18  SpO2:  [96 %-99 %] 96 %  BP: (116-178)/(65-93) 126/68     Weight: 85.7 kg (188 lb 15 oz)  Body mass index is 27.9 kg/m².  Body surface area is 2.04 meters squared.    No intake or output data in the 24 hours ending 02/08/25 1137     Physical Exam  Vitals reviewed.   Constitutional:       Appearance: Normal appearance.   HENT:      Head: Normocephalic and atraumatic.      Mouth/Throat:      Pharynx: Oropharynx is clear.   Eyes:      General: No scleral icterus.     Conjunctiva/sclera: Conjunctivae normal.   Cardiovascular:      Rate and Rhythm: Normal rate and regular rhythm.      Pulses: Normal pulses.      Heart sounds: Normal heart sounds.   Pulmonary:      Effort: Pulmonary effort is normal. No respiratory distress.   Abdominal:      General: Abdomen is flat.      Tenderness: There is no abdominal tenderness. There is no guarding or rebound.   Skin:     Coloration: Skin is not jaundiced.   Neurological:       General: No focal deficit present.      Mental Status: He is alert and oriented to person, place, and time.   Psychiatric:         Mood and Affect: Mood normal.         Behavior: Behavior normal.          Significant Labs:   All pertinent labs from the last 24 hours have been reviewed.    Diagnostic Results:  I have reviewed and interpreted all pertinent imaging results/findings within the past 24 hours.  Assessment/Plan:     * Unspecified visual disturbance  80 y/o M with PMHx of lung adenocarcinoma w/mets to brain s/p GK x 3, previously on durvalumab but stopped given recent multiple episodes of pneumonitis discharged earlier today after admission for TIA. Patient returns with reports of vision disturbance, headache, and walking difficulty. Per ED, patient reports not being able to see left visual field out of both eyes, confusion, headaches, and needing to use cane to get around. HDS. Patient reports overall vision blurriness. On examination, noted issues with tracking and convergence, no overly convincing visual field defects noted. Initial and repeat CTA stroke no evidence of acute hemorrhage or vascular distribution. Repeat MRI Brain with new T2 FLAIR signal abnormality in the subarachnoid space overlying the posterior right temporal and right occipital lobes. Ddx noninfectious vs noninfectious vs subarachnoid hemorrhage vs Leptomeningeal metastatic. Overall impression likely related related to know metastatic lesion vs side effects of GK    Plan:   - Neurology consulted, appreciate recs  - Neuro checks q 4 hours  - Continue dexamethasone  - Spoke with Rad Onc, will start Memantine       Metastasis to brain  Follows w/Dr. Ponce   On Cycle 4 of Durvalumab  MRI Brain 4/3/24 demonstrated a 0.8 cm Left thalamic metastasis  MRI Brain 7/26/24 demonstrated interval increase in size to 2.2 cm; no other evidence of intracranial disease.  MRI Brain 10/7/24 demonstrated new 1.9 cm Right medial temporo-occipital  metastasis.   MRI Brain 1/24/25 demonstrated a 0.4 cm posterior Right temporal lobe met   S/p Gamma Knife in 8/24, 10/24, and 1/25    Adenocarcinoma, lung, left  Follows w/Dr. Ponce   On Cycle 4 of Durvalumab  Mets to brain     CAD (coronary artery disease)    Plan:   Continue home ASA + statin     HTN (hypertension), benign    Plan:   - Continue home losartan     GERD (gastroesophageal reflux disease)    Plan:   - Continue home protonix     Centrilobular emphysema    Plan:   - Continue home roflumilast   - PRN albuterol HFA        Mawadah Samad, MD  Hematology/Oncology  [facility] - Emergency Dept

## 2025-02-08 NOTE — ASSESSMENT & PLAN NOTE
Jordin Allen Jr. is a 79 y.o. male with PMH of lung cancer with brain mets,  HTN, HLD, CAD that presents to the ED c/o vision loss. Patient was seen in the ED overnight after an unwitnessed fall. He was found face down at 11:30pm 02/06/2025. EMS noted that he had a right gaze preference and left- sided weakness. On arrival to ED, stroke code activated. CT MP and MRI obtained showed no acute intracranial abnormality. EEG was recommended but patient left the ED before an EEG could be done. This afternoon, patient returned to the ED because as his daughter was driving him home, the patient began stating that he could not see out of his left eye. Nurse reported that daughter was told to return to the ED by outside provider. Exam significant for complete left hemianopsia .NIHSS 3. CTH/CTA stroke multiphase negative for acute abnormality. Determined not a candidate for acute stroke interventions, OOW for TNK and no LVO for thrombectomy. Etiology of presentation unclear a this time.    Recommendations  - Etiology of presentation unclear, work-up ongoing per ED team. Low clinical suspicion for stoke due presentation of R MCA syndrome without LVO or diffusion restriction on imaging. If patient not returned to baseline, can get MRI.  -Obtain EEG, consider seizure medication  -Avoid hypotension, MAP>65  -Avoid hypoglycemia -180  - VN will sign- off. If MRI is obtained with findings concerning for stoke, contact VN for further eval and recs.      Thank you for involving us in the care of this patient.

## 2025-02-08 NOTE — ASSESSMENT & PLAN NOTE
78 y/o M with PMHx of lung adenocarcinoma w/mets to brain s/p GK x 3, previously on durvalumab but stopped given recent multiple episodes of pneumonitis discharged earlier today after admission for TIA. Patient returns with reports of vision disturbance, headache, and walking difficulty. Per ED, patient reports not being able to see left visual field out of both eyes, confusion, headaches, and needing to use cane to get around. HDS. Patient reports overall vision blurriness. On examination, noted issues with tracking and convergence, no overly convincing visual field defects noted. Initial and repeat CTA stroke no evidence of acute hemorrhage or vascular distribution. Repeat MRI Brain with new T2 FLAIR signal abnormality in the subarachnoid space overlying the posterior right temporal and right occipital lobes. Ddx noninfectious vs noninfectious vs subarachnoid hemorrhage vs Leptomeningeal metastatic. Overall impression likely related related to know metastatic lesion vs side effects of GK    Plan:   - Neurology consulted, appreciate recs  - Neuro checks q 4 hours  - Continue dexamethasone  - Spoke with Rad Onc, will start Memantine

## 2025-02-08 NOTE — ED NOTES
Pt is confused, seeing people in his room that are not present. Struggling with TV remote., states its keeps changing the channel.  Pt oriented to place,  self, and aware of my presence and name.

## 2025-02-09 PROBLEM — R29.818 ACUTE FOCAL NEUROLOGICAL DEFICIT: Status: RESOLVED | Noted: 2025-02-08 | Resolved: 2025-02-09

## 2025-02-09 LAB
ALBUMIN SERPL BCP-MCNC: 3.2 G/DL (ref 3.5–5.2)
ALP SERPL-CCNC: 83 U/L (ref 40–150)
ALT SERPL W/O P-5'-P-CCNC: 12 U/L (ref 10–44)
ANION GAP SERPL CALC-SCNC: 10 MMOL/L (ref 8–16)
AST SERPL-CCNC: 12 U/L (ref 10–40)
BASOPHILS # BLD AUTO: 0.01 K/UL (ref 0–0.2)
BASOPHILS NFR BLD: 0.1 % (ref 0–1.9)
BILIRUB SERPL-MCNC: 0.5 MG/DL (ref 0.1–1)
BUN SERPL-MCNC: 26 MG/DL (ref 8–23)
CALCIUM SERPL-MCNC: 9.3 MG/DL (ref 8.7–10.5)
CHLORIDE SERPL-SCNC: 105 MMOL/L (ref 95–110)
CO2 SERPL-SCNC: 26 MMOL/L (ref 23–29)
CREAT SERPL-MCNC: 1.1 MG/DL (ref 0.5–1.4)
DIFFERENTIAL METHOD BLD: ABNORMAL
EOSINOPHIL # BLD AUTO: 0 K/UL (ref 0–0.5)
EOSINOPHIL NFR BLD: 0 % (ref 0–8)
ERYTHROCYTE [DISTWIDTH] IN BLOOD BY AUTOMATED COUNT: 14 % (ref 11.5–14.5)
EST. GFR  (NO RACE VARIABLE): >60 ML/MIN/1.73 M^2
GLUCOSE SERPL-MCNC: 127 MG/DL (ref 70–110)
HCT VFR BLD AUTO: 34.8 % (ref 40–54)
HGB BLD-MCNC: 11.3 G/DL (ref 14–18)
IMM GRANULOCYTES # BLD AUTO: 0.08 K/UL (ref 0–0.04)
IMM GRANULOCYTES NFR BLD AUTO: 0.9 % (ref 0–0.5)
LYMPHOCYTES # BLD AUTO: 0.4 K/UL (ref 1–4.8)
LYMPHOCYTES NFR BLD: 4.6 % (ref 18–48)
MAGNESIUM SERPL-MCNC: 1.9 MG/DL (ref 1.6–2.6)
MCH RBC QN AUTO: 27.8 PG (ref 27–31)
MCHC RBC AUTO-ENTMCNC: 32.5 G/DL (ref 32–36)
MCV RBC AUTO: 86 FL (ref 82–98)
MONOCYTES # BLD AUTO: 0.7 K/UL (ref 0.3–1)
MONOCYTES NFR BLD: 7.8 % (ref 4–15)
NEUTROPHILS # BLD AUTO: 7.4 K/UL (ref 1.8–7.7)
NEUTROPHILS NFR BLD: 86.6 % (ref 38–73)
NRBC BLD-RTO: 0 /100 WBC
PHOSPHATE SERPL-MCNC: 2.6 MG/DL (ref 2.7–4.5)
PLATELET # BLD AUTO: 200 K/UL (ref 150–450)
PMV BLD AUTO: 10.5 FL (ref 9.2–12.9)
POTASSIUM SERPL-SCNC: 3.7 MMOL/L (ref 3.5–5.1)
PROT SERPL-MCNC: 6.1 G/DL (ref 6–8.4)
RBC # BLD AUTO: 4.06 M/UL (ref 4.6–6.2)
SODIUM SERPL-SCNC: 141 MMOL/L (ref 136–145)
WBC # BLD AUTO: 8.54 K/UL (ref 3.9–12.7)

## 2025-02-09 PROCEDURE — 5A09357 ASSISTANCE WITH RESPIRATORY VENTILATION, LESS THAN 24 CONSECUTIVE HOURS, CONTINUOUS POSITIVE AIRWAY PRESSURE: ICD-10-PCS | Performed by: HOSPITALIST

## 2025-02-09 PROCEDURE — 97112 NEUROMUSCULAR REEDUCATION: CPT

## 2025-02-09 PROCEDURE — 36415 COLL VENOUS BLD VENIPUNCTURE: CPT | Performed by: STUDENT IN AN ORGANIZED HEALTH CARE EDUCATION/TRAINING PROGRAM

## 2025-02-09 PROCEDURE — 80053 COMPREHEN METABOLIC PANEL: CPT | Performed by: STUDENT IN AN ORGANIZED HEALTH CARE EDUCATION/TRAINING PROGRAM

## 2025-02-09 PROCEDURE — 63600175 PHARM REV CODE 636 W HCPCS: Performed by: STUDENT IN AN ORGANIZED HEALTH CARE EDUCATION/TRAINING PROGRAM

## 2025-02-09 PROCEDURE — 97530 THERAPEUTIC ACTIVITIES: CPT

## 2025-02-09 PROCEDURE — 97165 OT EVAL LOW COMPLEX 30 MIN: CPT

## 2025-02-09 PROCEDURE — 84100 ASSAY OF PHOSPHORUS: CPT

## 2025-02-09 PROCEDURE — 83735 ASSAY OF MAGNESIUM: CPT

## 2025-02-09 PROCEDURE — 25000003 PHARM REV CODE 250: Performed by: STUDENT IN AN ORGANIZED HEALTH CARE EDUCATION/TRAINING PROGRAM

## 2025-02-09 PROCEDURE — 97116 GAIT TRAINING THERAPY: CPT

## 2025-02-09 PROCEDURE — 85025 COMPLETE CBC W/AUTO DIFF WBC: CPT | Performed by: STUDENT IN AN ORGANIZED HEALTH CARE EDUCATION/TRAINING PROGRAM

## 2025-02-09 PROCEDURE — 97163 PT EVAL HIGH COMPLEX 45 MIN: CPT

## 2025-02-09 PROCEDURE — 94660 CPAP INITIATION&MGMT: CPT

## 2025-02-09 PROCEDURE — 20600001 HC STEP DOWN PRIVATE ROOM

## 2025-02-09 PROCEDURE — 99900035 HC TECH TIME PER 15 MIN (STAT)

## 2025-02-09 PROCEDURE — 25000003 PHARM REV CODE 250

## 2025-02-09 PROCEDURE — 94761 N-INVAS EAR/PLS OXIMETRY MLT: CPT

## 2025-02-09 PROCEDURE — 27000190 HC CPAP FULL FACE MASK W/VALVE

## 2025-02-09 PROCEDURE — 97535 SELF CARE MNGMENT TRAINING: CPT

## 2025-02-09 RX ORDER — SODIUM,POTASSIUM PHOSPHATES 280-250MG
2 POWDER IN PACKET (EA) ORAL ONCE
Status: COMPLETED | OUTPATIENT
Start: 2025-02-09 | End: 2025-02-09

## 2025-02-09 RX ADMIN — MEMANTINE HYDROCHLORIDE 5 MG: 5 TABLET ORAL at 08:02

## 2025-02-09 RX ADMIN — LOSARTAN POTASSIUM 100 MG: 50 TABLET, FILM COATED ORAL at 09:02

## 2025-02-09 RX ADMIN — ROFLUMILAST 500 MCG: 500 TABLET ORAL at 09:02

## 2025-02-09 RX ADMIN — AMITRIPTYLINE HYDROCHLORIDE 25 MG: 25 TABLET, FILM COATED ORAL at 08:02

## 2025-02-09 RX ADMIN — DEXAMETHASONE 4 MG: 4 TABLET ORAL at 09:02

## 2025-02-09 RX ADMIN — POTASSIUM & SODIUM PHOSPHATES POWDER PACK 280-160-250 MG 2 PACKET: 280-160-250 PACK at 09:02

## 2025-02-09 RX ADMIN — ASPIRIN 81 MG: 81 TABLET, COATED ORAL at 09:02

## 2025-02-09 RX ADMIN — TRAZODONE HYDROCHLORIDE 50 MG: 50 TABLET ORAL at 08:02

## 2025-02-09 RX ADMIN — ENOXAPARIN SODIUM 40 MG: 40 INJECTION SUBCUTANEOUS at 05:02

## 2025-02-09 RX ADMIN — FUROSEMIDE 20 MG: 20 TABLET ORAL at 09:02

## 2025-02-09 RX ADMIN — PANTOPRAZOLE SODIUM 40 MG: 40 TABLET, DELAYED RELEASE ORAL at 09:02

## 2025-02-09 RX ADMIN — ATORVASTATIN CALCIUM 80 MG: 40 TABLET, FILM COATED ORAL at 09:02

## 2025-02-09 RX ADMIN — DEXAMETHASONE 4 MG: 4 TABLET ORAL at 08:02

## 2025-02-09 RX ADMIN — MEMANTINE HYDROCHLORIDE 5 MG: 5 TABLET ORAL at 09:02

## 2025-02-09 NOTE — HOSPITAL COURSE
"Admitted to medical oncology with reports of left visual field loss out of both eyes. CTA Stroke with no evidence of acute hemorrhage or major vascular infarct. MRI Brain with "increased T2 Flair in the subarachnoid space overlying the posterior right temporal and right occipital lobes in the settin gof known metastatic lesion. Stable vasogenic edema. Infectious or noninfectious inflammatory meningitis difficult centered.  Small volume subarachnoid hemorrhage would additionally be considered, noting that there is no significant susceptibility-related signal loss in this region.  CSF sampling and/or a short-term follow-up noncontrast head CT may be of benefit in this regard.  Leptomeningeal metastatic disease would be considered somewhat less likely given relatively rapid change in appearance since the recent MRI". Neurology and ophthalmology consulted for vision loss w/u. On Decadron for vasogenic edema control. Started Memantine to mitigate cognitive effects of GK. PT/OT consulted recommending high intensity therapy. It does not appear that his symptoms are caused by a new metastasis because of their rapid onset, he has lumbar puncture was not concerning for infection, but he still has cytology and cultures pending.  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 He has gotten better with out antibiotics, but we will still likely need rehab. He has cytology and cultures from his CSF studies that are still pending. Also, he will need to follow up with Ophtho outpatient.           "

## 2025-02-09 NOTE — CONSULTS
"[facility] ([facility])  Neurology  Consult Note    Patient Name: Jordin Allen Jr.  MRN: [id number]  Admission Date: 2/7/2025  Hospital Length of Stay: 2 days  Code Status: Full Code   Attending Provider: {ATTENDING PROVIDER:[id number]}  Consulting Provider: Shaun Reid MD  Primary Care Physician: Sierra Landry MD  Principal Problem:Vision, loss, sudden, unspecified laterality    Consults   Subjective:     Chief Complaint:  ***     HPI:   Neurology is consulted for unspecified visual disturbance in a 79 y.o. 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 The patient was discharged earlier today after admission for TIA concerns and returned with vision disturbance, headache, and difficulty walking.    Per ED, the patient described sudden inability to see his left visual field in both eyes, overall blurriness, and an episode of confusion while driving home from the hospital earlier today, stating he "did not know what street he was on," which is atypical for him. Additionally, his daughter reports progressive slowing of movement over the last few days, increased cane use, and behavioral changes including irritability. No reported extremity weakness, sensory deficits, falls, or speech changes.  He was found face down the night prior to admission with a temporary right gaze deviation and worsening left-sided weakness, prompting inpatient evaluation. MRI was negative for acute CVA, and he was discharged on Decadron with outpatient EEG planned. On examination, he exhibited difficulty with tracking and convergence, though no definitive visual field deficits were appreciated.    CTA stroke was negative for hemorrhage or large vessel occlusion. However, repeat MRI revealed a new T2 FLAIR hyperintensity in the subarachnoid space overlying the posterior right temporal and right occipital lobes, adjacent to known metastatic " lesions.     No new subjective & objective note has been filed under this hospital service since the last note was generated.    Assessment and Plan:     * Vision, loss, sudden, unspecified laterality  79 y.o. M with known brain mets from lung adenocarcinoma, s/p multiple GK treatments, presented after recent discharge for TIA concerns with new onset left homonymous hemianopia, confusion, and gait instability. He initially presented with right gaze deviation and left-sided weakness, though MRI was negative for acute CVA. He was discharged on Decadron with outpatient EEG planned. On return, he reported sudden left-sided visual loss in both eyes and an episode of confusion while driving. Examination demonstrated impaired tracking and convergence, but no overt hemianopia was confirmed. Repeat MRI showed new T2 FLAIR hyperintensity in the posterior right temporal and occipital subarachnoid space, adjacent to a known metastatic lesion. Given the rapid radiographic change, considerations include noninfectious inflammatory changes, subarachnoid hemorrhage, or leptomeningeal metastasis.     This patients symptoms localize to the right occipital and possibly temporal lobes, corresponding with his new MRI findings. The etiology is likely related to his known metastatic disease, but the rapid onset raises suspicion for additional processes. Given his history of prior GK treatment, radiation necrosis or treatment-related edema are possible, but the presence of subarachnoid FLAIR changes also raises concern for hemorrhagic or inflammatory processes. Leptomeningeal metastasis remains a possibility, but the relatively acute radiographic evolution is atypical. The timeline suggests a subacute process, making infection less likely.    Serial imaging over the past 24 hours shows worsening right occipital and posterior temporal T2 FLAIR hyperintensity without restricted diffusion on DWI, arguing against acute infarction. SWI does not  reveal hemorrhagic signal, making a large SAH less likely, though subtle SAH cannot be excluded. The primary differential includes progressive vasogenic edema from radiation necrosis or tumor progression, leptomeningeal metastasis (questionable given acute timeline), or noninfectious inflammatory meningitis (again, rather atypical rapid radiologic change). Given the lack of restricted diffusion, ischemic stroke is less likely. His symptoms and MRI findings necessitate further evaluation with MRI brain with contrast and LP for CSF analysis to differentiate between these etiologies.    -- MRI Brain with contrast: prioritized for leptomeningeal disease, tumor, radiation necrosis.  -- Consider noncontrast head CT: Specifically for evaluating subtle SAH, which may be more sensitive than MRI for hyperacute SAH  -- Consider LP if imaging remains unclear to assess for malignant cells, xanthochromia (if SAH remains a concern), and inflammatory markers.  -- Continue Decadron for vasogenic edema control.  -- Neuro checks q4h for worsening symptoms, including mental status and vision.  -- Neurology will sign off, please call with questions or concerns  -- Consider LP for CSF analysis to evaluate for inflammatory, infectious, or neoplastic etiologies.  -- Rad Onc consulted; continue Memantine to mitigate cognitive effects of GK.        VTE Risk Mitigation (From admission, onward)           Ordered     enoxaparin injection 40 mg  Every 24 hours         02/07/25 1923     Place sequential compression device  Until discontinued         02/07/25 1923                    Thank you for your consult. {SIGN OFF/FOLLOW-UP:46137}    Shaun Reid MD  Neurology  [facility] - Oncology ([facility])

## 2025-02-09 NOTE — PLAN OF CARE
Plan of care reviewed with pt. Pt AAOx4 but intermittently confused. Pt with hearing aids not.  Pt requesting to shave, refused electric razor. Pt up to RR with assistx1. Tele sitter in room, bed alarm set.  All VSS, afebrile, free from falls or injuries; no acute events so far this shift. Pt in bed with non-skid socks on, bed locked and in lowest position, side rails up x2, call light and personal items within reach. Pt instructed to call for assistance as needed.

## 2025-02-09 NOTE — ASSESSMENT & PLAN NOTE
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 Patient returns with reports of vision disturbance, headache, and walking difficulty. Per ED, patient reports not being able to see left visual field out of both eyes, confusion, headaches, and needing to use cane to get around. HDS. Patient reports overall vision blurriness. On examination, noted issues with tracking and convergence, no overly convincing visual field defects noted. Initial and repeat CTA stroke no evidence of acute hemorrhage or vascular distribution. Repeat MRI Brain with new T2 FLAIR signal abnormality in the subarachnoid space overlying the posterior right temporal and right occipital lobes. Ddx noninfectious vs noninfectious vs subarachnoid hemorrhage vs Leptomeningeal metastatic. Overall impression likely related related to know metastatic lesion vs side effects of GK    Plan:   - Neurology consulted, appreciate recs  - Ophthalmology consulted, appreciate recs  - Neuro checks q 4 hours  - Continue dexamethasone  - Continue memantine

## 2025-02-09 NOTE — ED NOTES
Telemetry Verification   Patient placed on Telemetry Box  Verified with War Room  Box # [id number]   Monitor Tech War room   Rate 92   Rhythm NSR

## 2025-02-09 NOTE — NURSING
Received from ED awake alert oriented to self, place, time and situation with periods of intermittent confusion. Pt ia on room air, VSS. As per pt, he fell at home about 4 days ago as a result, sustained bruises and blisters and skin tear to left are.upon assessment, pt is could read words on the board. He denied pain or discomfort. Oriented to room and unit routine. Educated on fall prevention and encouraged to call for assistance to prevent fall. Call light and urinal placed within easy reach. Will continue to monitor neuro vitals q4h as ordered.

## 2025-02-09 NOTE — ASSESSMENT & PLAN NOTE
79 y.o. M with known brain mets from lung adenocarcinoma, s/p multiple GK treatments, presented after recent discharge for TIA concerns with new onset left homonymous hemianopia, confusion, and gait instability. He initially presented with right gaze deviation and left-sided weakness, though MRI was negative for acute CVA. He was discharged on Decadron with outpatient EEG planned. On return, he reported sudden left-sided visual loss in both eyes and an episode of confusion while driving. Examination demonstrated impaired tracking and convergence, but no overt hemianopia was confirmed. Repeat MRI showed new T2 FLAIR hyperintensity in the posterior right temporal and occipital subarachnoid space, adjacent to a known metastatic lesion. Given the rapid radiographic change, considerations include noninfectious inflammatory changes, subarachnoid hemorrhage, or leptomeningeal metastasis.     This patients symptoms localize to the right occipital and possibly temporal lobes, corresponding with his new MRI findings. The etiology is likely related to his known metastatic disease vs infection given rapid onset. Radiation necrosis or treatment-related edema are possible, but the presence of subarachnoid FLAIR changes also raises concern for infectious, inflammatory, or leptomeningeal metastasis.     Serial imaging over the past 24 hours shows worsening right occipital and posterior temporal T2 FLAIR hyperintensity without restricted diffusion on DWI, arguing against acute infarction. SWI does not reveal hemorrhagic signal, making a large SAH less likely, though subtle SAH cannot be excluded. The primary differential includes leptomeningeal metastasis (questionable given acute timeline), or infectious process.           -- Recommend Keppra 500mg BID; given increased risk of seizures  -- Recommend empiric therapy for possible infection  -- If it changes management, consider LP to assess for infections as well as malignant  cells  -- Neuro checks q4h for worsening symptoms, including mental status and vision.  -- Neurology will continue to follow, please call with questions or concerns

## 2025-02-09 NOTE — PT/OT/SLP EVAL
Occupational Therapy  Co- Evaluation    Name: Jordin Allen Jr.  MRN: [id number]  Admitting Diagnosis: Vision, loss, sudden, unspecified laterality  Recent Surgery: * No surgery found *      Recommendations:     Discharge Recommendations: High Intensity Therapy  Discharge Equipment Recommendations:  walker, rolling  Barriers to discharge:       Assessment:     Jordin Allen Jr. is a 79 y.o. male with a medical diagnosis of Vision, loss, sudden, unspecified laterality. Performance deficits affecting function: weakness, impaired endurance, impaired sensation, impaired self care skills, impaired coordination, decreased safety awareness, decreased coordination, impaired cognition, visual deficits, impaired balance, gait instability, impaired functional mobility.  Pt agreeable to therapy and tolerated well. Pt with no LSW or R gaze preference. Pt with possible L visual field cut and R peripheral field cut. Neurology MD present during session and notified. Pt with poor word finding and inconsistent answers during visual/sensory assessments.  Pt AOx4 but intermittently confused throughout session. Poor insight into functional deficits. Pt remains limited in ADLs, functional mobility, and functional transfers. Patient presents with good participation and motivation to return to prior level of function with high intensity therapy.  The patient demonstrates appropriate endurance and strength to participate in up to 3 hours or 15hrs of combined therapy post acute.    Co-treat performed due to acuity and complexity of pt's medical status with 2 skilled disciplines needed to optimize pts occupational performance and to improve activity tolerance.     Rehab Prognosis: Good; patient would benefit from acute skilled OT services to address these deficits and reach maximum level of function.       Plan:     Patient to be seen 4 x/week to address the above listed problems via self-care/home management, therapeutic activities,  "therapeutic exercises, neuromuscular re-education  Plan of Care Expires: 03/12/25  Plan of Care Reviewed with: patient    Subjective     Chief Complaint: missing hearing aids  Patient/Family Comments/goals: "My daughter is coming to get me today to go  watch the big game."     Occupational Profile:  Living Environment: Pt lives with daughter, her , and grandchild in [address] with bed/bath on 1st floor/ WIS w/ grab bars and built in bench  Previous level of function: (I) ADLs and  mobility  Roles and Routines: watching TV  Equipment Used at Home: grab bar, bath bench, CPAP  Assistance upon Discharge: family    Pain/Comfort:  Pain Rating 1: 0/10  Pain Rating Post-Intervention 1: 0/10    Patients cultural, spiritual, Sikh conflicts given the current situation: no    Objective:     Communicated with: Nurse prior to session.  Patient found sitting edge of bed with telemetry, pulse ox (continuous), bed alarm (telesitter) upon OT entry to room.    General Precautions: Standard, fall, aspiration  Orthopedic Precautions:    Braces: N/A  Respiratory Status: Room air    Occupational Performance:    Bed Mobility:    Patient completed Scooting/Bridging with stand by assistance. Pt scooted in bed to L side  EOB from  R side EOB.      Functional Mobility/Transfers:  Patient completed Sit <> Stand Transfer with contact guard assistance  with  rolling walker   Functional Mobility: Pt engaged in functional mobility throughout hospital room and bathroom ~ 20 ft with RW and  CGA to maximize functional endurance and standing balance required for home/community mobility and occupational engagement. When returning to bed from performing oral hygiene in bathroom, pt began to sit parallel to bed  with nothing  behind pt, requiring Mod A to assist pt with correct placement and safety      Activities of Daily Living:  Grooming: contact guard assistance pt stood at sink to perform oral hygiene in RW   Upper Body Dressing: contact " "guard assistance pt donned hospital gown over back sitting EOB     Cognitive/Visual Perceptual:  Cognitive/Psychosocial Skills:     -       Oriented to: Person, Place, Time, and Situation   -       Follows Commands/attention:Follows two-step commands  -       Communication: clear/fluent  -       Memory: impaired  -       Safety awareness/insight to disability: impaired   -       Mood/Affect/Coping skills/emotional control: Appropriate to situation  Visual/Perceptual:      -Impaired  visual field, motor planning praxis, and quadrants. OT performed extensive visual assess ment 2/2 pt's admission problem being L field  vision loss. Pt with no R gaze presence, saccades and smooth pursuits In tact.Pt with suspected complete L visual field cut, R peripheral cut. Pt able to visually track without difficulty.     BIMS:  Number of words repeated after first attempt:   3 three     Temporal Orientation:   Able to report correct year:   3 Correct   Able to report correct month:   2 accuracy within 5 days  Able to report correct day of the week:  1 Correct     Recall:   Able to recall "sock"   2 yes, no cue required   Able to recall "blue"  2 yes, no cue required   Able to recall "bed"   2 yes, no cue required     Summary Score:   Add scores for each question and fill in total score: 15/_    Physical Exam:  Balance:    -       CGA A during mobility, SBA sitting EOB   Sensation:    -       Impaired  Pt able to respond to touch but impaired R/L and digit discrimination. Pt with no sensory deficits in face    Dominant hand:    -       Right  Upper Extremity Range of Motion:     -       Right Upper Extremity: WNL  -       Left Upper Extremity: WNL  Upper Extremity Strength:    -       Right Upper Extremity: WNL  -       Left Upper Extremity: WNL   Strength:    -       Right Upper Extremity: WNL  -       Left Upper Extremity: WNL  Fine Motor Coordination:    -       Intact    AMPAC 6 Click ADL:  AMPAC Total Score: 21    Treatment " & Education:  -Education on energy conservation and task modification to maximize safety and (I) during ADLs and mobility  -Education on importance of OOB activity to improve overall activity tolerance and promote recovery  -Pt educated to call for assistance and to transfer with hospital staff only  -Provided education regarding role of OT, POC, & discharge recommendations with pt verbalizing understanding.  Pt had no further questions & when asked whether there were any concerns pt reported none.      Patient left HOB elevated with all lines intact, call button in reach, bed alarm on, nurse notified, and tele sitter present    GOALS:   Multidisciplinary Problems       Occupational Therapy Goals          Problem: Occupational Therapy    Goal Priority Disciplines Outcome Interventions   Occupational Therapy Goal     OT, PT/OT Progressing    Description: Goals to be met by: 3/12/2025     Patient will increase functional independence with ADLs by performing:    UE Dressing with Supervision.  LE Dressing with Supervision.  Grooming while standing at sink with Supervision.  Toileting from toilet with Supervision for hygiene and clothing management.   Step transfer with Supervision  Toilet transfer to toilet with Supervision.                         DME Justifications:   Jordin's mobility limitation cannot be sufficiently resolved by the use of a cane. His functional mobility deficit can be sufficiently resolved with the use of a Rolling Walker. Patient's mobility limitation significantly impairs their ability to participate in one of more activities of daily living.  The use of a RW will significantly improve the patient's ability to participate in MRADLS and the patient will use it on regular basis in the home.    History:     Past Medical History:   Diagnosis Date    Benign neoplasm of colon 10/01/2013    Bronchitis chronic     CAD (coronary artery disease) 10/31/2013    COPD (chronic obstructive pulmonary disease)      Emphysema of lung     Encounter for preventive health examination 3/21/2018    GERD (gastroesophageal reflux disease)     Hx of colonic polyps     Hypertension     NAFLD (nonalcoholic fatty liver disease) 03/27/2017    SHOLA on CPAP     RLS (restless legs syndrome)     Screen for colon cancer 08/30/2013         Past Surgical History:   Procedure Laterality Date    bilateral foot surgery  1993    CARDIAC CATHETERIZATION  2013    x1    CATARACT EXTRACTION, BILATERAL      COLON SURGERY  2013    Ace Mott MD    COLONOSCOPY N/A 3/21/2018    Procedure: COLONOSCOPY;  Surgeon: Terry Mott MD;  [facility]  Service: Endoscopy;  Laterality: N/A;    COLONOSCOPY N/A 4/12/2019    Procedure: COLONOSCOPY;  Surgeon: John Mantilla MD;  [facility]  Service: Endoscopy;  Laterality: N/A;    COLONOSCOPY N/A 5/31/2022    Procedure: COLONOSCOPY;  Surgeon: MEDINA Farris MD;  Location: [facility] ([address] [place] [address]);  Service: Endoscopy;  Laterality: N/A;  fully vacc-inst portal-tb    ENDOBRONCHIAL ULTRASOUND Bilateral 4/30/2024    Procedure: ENDOBRONCHIAL ULTRASOUND (EBUS);  Surgeon: Naveed Aguero MD;  Location: [facility] OR;  Service: Pulmonary;  Laterality: Bilateral;    scrotal abscess removal         Time Tracking:     OT Date of Treatment: 02/09/25  OT Start Time: 0941  OT Stop Time: 1014  OT Total Time (min): 33 min    Billable Minutes:Evaluation 10 min  Self Care/Home Management 13 min  Neuromuscular Re-education 10 min    2/9/2025

## 2025-02-09 NOTE — ED NOTES
Assumed care of pt.     Pt AAOx4, resting comfortably in bed, NAD, respirations E/UL, updated on POC, wheels locked and in low position, call bell with in reach, Comfort positioning and restroom needs were addressed. Necessary items were placed with in reach and was advised when a reassessment would take place.       Pt denies any new complaints at this time.

## 2025-02-09 NOTE — PT/OT/SLP EVAL
"Physical Therapy  Co - Evaluation    Patient Name:  Jordin Allen Jr.   MRN:  [id number]    Recommendations:     Discharge Recommendations: High Intensity Therapy   Discharge Equipment Recommendations: walker, rolling   Barriers to discharge:  evolving clinical presentation, increased level of skilled assist required    Assessment:     Jordin Allen Jr. is a 79 y.o. male admitted with a medical diagnosis of Vision, loss, sudden, unspecified laterality.  He presents with the following impairments/functional limitations: weakness, impaired endurance, gait instability, impaired functional mobility, impaired balance, decreased lower extremity function, decreased safety awareness, impaired cognition, visual deficits Patient agreeable to therapy this date, and tolerated the session well. Noted to have impairments with vision, R vs. L discrimination and impulsivity leading to overall diminished insight into deficits. He is a high fall  risk at this time. Patient will continue to benefit from skilled PT during this admit to address BLE strength and endurance deficits, and maximize independence with functional mobility.    Rehab Prognosis: Good; patient would benefit from acute skilled PT services to address these deficits and reach maximum level of function.    Recent Surgery: * No surgery found *      Plan:     During this hospitalization, patient to be seen 4 x/week to address the identified rehab impairments via gait training, neuromuscular re-education, therapeutic activities, therapeutic exercises and progress toward the following goals:    Plan of Care Expires:  03/11/25    Subjective     Chief Complaint: "I feel fine"  Patient/Family Comments/goals: return home to watch Super Bowl  Pain/Comfort:  Pain Rating 1: 0/10  Pain Rating Post-Intervention 1: 0/10    Patients cultural, spiritual, Jehovah's witness conflicts given the current situation: no    Living Environment:  Patient lives with daughter, son in law (works from " home), 16 year old granddaughter in a 2SH with 1 MICHAEL.  Bathroom contains walk-in shower, grab bar, and built-in bench.  Prior to admission, patients level of function was independence. 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 Equipment used at home: grab bar, bath bench, CPAP.  DME owned (not currently used): single point cane.  Upon discharge, patient will have assistance from family.    Objective:     Communicated with RN prior to session.  Patient found sitting edge of bed with telemetry, pulse ox (continuous) (telesitter)  upon PT entry to room.    General Precautions: Standard, fall, aspiration  Orthopedic Precautions:N/A   Braces: N/A  Respiratory Status: Room air    Exams:  Cognitive Exam:  Patient is oriented to Person, Place, Time, and Situation  Gross Motor Coordination:  impaired  Postural Exam:  Patient presented with the following abnormalities:    -       Rounded shoulders  -       Forward head  Sensation:    -       Impaired  R vs L discrimination and light/touch BLE  RLE ROM: WFL  RLE Strength: WFL  LLE ROM: WFL  LLE Strength: WFL    Functional Mobility:  Bed Mobility:    Transfer from R to L side of bed: SBA  Transfers:     Sit to Stand:  contact guard assistance with rolling walker  Stand to Sit on EOB following ambulation: mod A with RW, patient attempts to prematurely sit (not near bed or chair)  Gait: 12 ft x2 with RW and CGA  Assist with RW management to assist with steering, increased difficulty with obstacle negotiation, FFP, decreased step length  Balance:   Sitting: SBA  Standing: CGA with BUE support on RW      AM-PAC 6 CLICK MOBILITY  Total Score:17       Treatment & Education:  Patient educated on calling for assistance for any needs to improve overall safety awareness.  Patient educated on role of PT in acute care, PT POC, and PT goals.  All items placed within reach, and notified on call light usage for assistance with any needs for fall prevention.  Patient educated on current  level of function and progression towards therapeutic goals.  Patient required co-evaluation with OT for highest quality care d/t decreased activity tolerance and increased level of assistance necessary.  Conferred with team regarding patient's level of function, post-acute therapy recommendations.    Patient left HOB elevated with all lines intact, call button in reach, bed alarm on, and telesitter, RN present.    GOALS:   Multidisciplinary Problems       Physical Therapy Goals          Problem: Physical Therapy    Goal Priority Disciplines Outcome Interventions   Physical Therapy Goal     PT, PT/OT Progressing    Description: Goals to be met by: 25     Patient will increase functional independence with mobility by performin. Supine to sit with Stand-by Assistance  2. Sit to supine with Stand-by Assistance  3. Sit to stand transfer with Stand-by Assistance  4. Bed to chair transfer with Stand-by Assistance using Rolling Walker  5. Gait  x 50 feet with Stand-by Assistance using Rolling Walker.   6. Ascend/Descend 4 inch curb step with Stand-by Assistance using Rolling Walker.  7. Lower extremity exercise program x15 reps per handout, with assistance as needed                         DME Justifications:   Jordin's mobility limitation cannot be sufficiently resolved by the use of a cane. His functional mobility deficit can be sufficiently resolved with the use of a Rolling Walker. Patient's mobility limitation significantly impairs their ability to participate in one of more activities of daily living.  The use of a RW will significantly improve the patient's ability to participate in MRADLS and the patient will use it on regular basis in the home.    History:     Past Medical History:   Diagnosis Date    Benign neoplasm of colon 10/01/2013    Bronchitis chronic     CAD (coronary artery disease) 10/31/2013    COPD (chronic obstructive pulmonary disease)     Emphysema of lung     Encounter for preventive  health examination 3/21/2018    GERD (gastroesophageal reflux disease)     Hx of colonic polyps     Hypertension     NAFLD (nonalcoholic fatty liver disease) 03/27/2017    SHOLA on CPAP     RLS (restless legs syndrome)     Screen for colon cancer 08/30/2013       Past Surgical History:   Procedure Laterality Date    bilateral foot surgery  1993    CARDIAC CATHETERIZATION  2013    x1    CATARACT EXTRACTION, BILATERAL      COLON SURGERY  2013    Ace Mott MD    COLONOSCOPY N/A 3/21/2018    Procedure: COLONOSCOPY;  Surgeon: Terry Mott MD;  Location: [facility] ([address] [place] [address]);  Service: Endoscopy;  Laterality: N/A;    COLONOSCOPY N/A 4/12/2019    Procedure: COLONOSCOPY;  Surgeon: John Mantilla MD;  [facility]  Service: Endoscopy;  Laterality: N/A;    COLONOSCOPY N/A 5/31/2022    Procedure: COLONOSCOPY;  Surgeon: MEDINA Farris MD;  Location: [facility] ([address] [place] [address]);  Service: Endoscopy;  Laterality: N/A;  fully vacc-inst portal-tb    ENDOBRONCHIAL ULTRASOUND Bilateral 4/30/2024    Procedure: ENDOBRONCHIAL ULTRASOUND (EBUS);  Surgeon: Naveed Aguero MD;  [facility];  Service: Pulmonary;  Laterality: Bilateral;    scrotal abscess removal         Time Tracking:     PT Received On: 02/09/25  PT Start Time: 0941     PT Stop Time: 1014  PT Total Time (min): 33 min     Billable Minutes: Evaluation 10, Gait Training 10, and Therapeutic Activity 13      02/09/2025

## 2025-02-09 NOTE — PLAN OF CARE
PT Evaluation completed 2025   PT goals and POC established.   High intensity physical therapy recommendation post-acutely.     Problem: Physical Therapy  Goal: Physical Therapy Goal  Description: Goals to be met by: 25     Patient will increase functional independence with mobility by performin. Supine to sit with Stand-by Assistance  2. Sit to supine with Stand-by Assistance  3. Sit to stand transfer with Stand-by Assistance  4. Bed to chair transfer with Stand-by Assistance using Rolling Walker  5. Gait  x 50 feet with Stand-by Assistance using Rolling Walker.   6. Ascend/Descend 4 inch curb step with Stand-by Assistance using Rolling Walker.  7. Lower extremity exercise program x15 reps per handout, with assistance as needed    Outcome: Progressing

## 2025-02-09 NOTE — PLAN OF CARE
Problem: Occupational Therapy  Goal: Occupational Therapy Goal  Description: Goals to be met by: 3/12/2025     Patient will increase functional independence with ADLs by performing:    UE Dressing with Supervision.  LE Dressing with Supervision.  Grooming while standing at sink with Supervision.  Toileting from toilet with Supervision for hygiene and clothing management.   Step transfer with Supervision  Toilet transfer to toilet with Supervision.    Outcome: Progressing     Patient demonstrates a mobility limitation that significantly impairs their ability to participate in one or more mobility related activities of daily living. Patient's mobility limitation cannot be sufficiently resolved with the use of a cane, but can be sufficiently resolved with the use of a rolling walker.The use of a rolling walker will considerably improve their ability to participate in MRADLs. Patient will use the walker on a regular basis at home.

## 2025-02-09 NOTE — SUBJECTIVE & OBJECTIVE
Interval History: Reports double and blurry vision today. Also, having issues with color vision. Neurology and Ophthalmology consulted. Overall presentation c/f decline in the setting of known metastatic brain lesions complicated by side effects of Gamma Knife.     Oncology Treatment Plan:   OP DURVALUMAB 1500 MG Q4W    Medications:  Continuous Infusions:  Scheduled Meds:   amitriptyline  25 mg Oral QHS    aspirin  81 mg Oral Daily    atorvastatin  80 mg Oral Daily    dexAMETHasone  4 mg Oral BID    enoxparin  40 mg Subcutaneous Q24H (prophylaxis, 1700)    fluticasone propionate  2 spray Each Nostril Daily    furosemide  20 mg Oral Daily    losartan  100 mg Oral Daily    memantine  5 mg Oral BID    pantoprazole  40 mg Oral Daily    roflumilast  1 tablet Oral Daily    traZODone  50 mg Oral QHS     PRN Meds:  Current Facility-Administered Medications:     acetaminophen, 650 mg, Oral, Q6H PRN    albuterol, 2 puff, Inhalation, Q4H PRN    dextrose 50%, 12.5 g, Intravenous, PRN    dextrose 50%, 25 g, Intravenous, PRN    glucagon (human recombinant), 1 mg, Intramuscular, PRN    glucose, 16 g, Oral, PRN    glucose, 24 g, Oral, PRN    ibuprofen, 600 mg, Oral, Q6H PRN    iohexoL, 100 mL, Intravenous, ONCE PRN    naloxone, 0.02 mg, Intravenous, PRN    nitroGLYCERIN, 0.4 mg, Sublingual, Q5 Min PRN    sodium chloride 0.9%, 10 mL, Intravenous, Q12H PRN     Review of Systems  Objective:     Vital Signs (Most Recent):  Temp: 97.5 °F (36.4 °C) (02/09/25 1103)  Pulse: 94 (02/09/25 1103)  Resp: 17 (02/09/25 1103)  BP: (!) 134/93 (02/09/25 1103)  SpO2: 95 % (02/09/25 1103) Vital Signs (24h Range):  Temp:  [96.5 °F (35.8 °C)-98.5 °F (36.9 °C)] 97.5 °F (36.4 °C)  Pulse:  [] 94  Resp:  [17-20] 17  SpO2:  [94 %-97 %] 95 %  BP: (117-145)/(72-93) 134/93     Weight: 84.5 kg (186 lb 4.6 oz)  Body mass index is 27.51 kg/m².  Body surface area is 2.03 meters squared.      Intake/Output Summary (Last 24 hours) at 2/9/2025 1421  Last data  filed at 2/9/2025 0939  Gross per 24 hour   Intake 240 ml   Output 350 ml   Net -110 ml        Physical Exam  Vitals reviewed.   Constitutional:       Appearance: Normal appearance.   HENT:      Head: Normocephalic and atraumatic.      Mouth/Throat:      Pharynx: Oropharynx is clear.   Eyes:      General: No scleral icterus.     Conjunctiva/sclera: Conjunctivae normal.   Cardiovascular:      Rate and Rhythm: Normal rate and regular rhythm.      Pulses: Normal pulses.      Heart sounds: Normal heart sounds.   Pulmonary:      Effort: Pulmonary effort is normal. No respiratory distress.   Abdominal:      General: Abdomen is flat.      Tenderness: There is no abdominal tenderness. There is no guarding or rebound.   Musculoskeletal:      Right lower leg: No edema.      Left lower leg: No edema.   Skin:     Coloration: Skin is not jaundiced.   Neurological:      Mental Status: He is alert and oriented to person, place, and time.   Psychiatric:         Mood and Affect: Mood normal.         Behavior: Behavior normal.          Significant Labs:   All pertinent labs from the last 24 hours have been reviewed.    Diagnostic Results:  I have reviewed and interpreted all pertinent imaging results/findings within the past 24 hours.

## 2025-02-09 NOTE — PROGRESS NOTES
[facility] - Oncology ([facility])  Hematology/Oncology  Progress Note    Patient Name: Jordin Allen Jr.  Admission Date: 2/7/2025  Hospital Length of Stay: 2 days  Code Status: Full Code     Subjective:     HPI:  Jordin Allen, 80 y/o Male with PMHx of lung cancer with brain Mets, HTN, HLD, CAD, obesity, ex-smoker, presents after discharge earlier in the day due to vision loss in his left eye.      Patient discharged earlier in the day after admission for TIA concerns. He was found face down on the ground last night and was noted to have a temporary gaze deficit to the right and worsening left sided weakness. MRI brain w/wo obtained that was negative for acute CVA. Recommended to have outpatient EEG. Discharged on Decadron.      Patient returned home and was changing clothes with his daughter. Daughter reports he suddenly said that he lost his vision. Describes not being able to see his left visual field out of both eyes. LKN was 2:30pm. Denies eye pain. Denies extremity weakness or sensory changes. No speech changes. No falls. Daughter reports he has been moving slower over the last few days and needing a cane. Also reports he has been easily agitated and inpatient. She did mention an episode of confusion while driving home from the hospital today. Reports he did not know what street he was on which is unusual for him. In the ED, vascular neuro consulted who recommended repeat MRI and EEG.    Interval History: Reports double and blurry vision today. Also, having issues with color vision. Neurology and Ophthalmology consulted. Overall presentation c/f decline in the setting of known metastatic brain lesions complicated by side effects of Gamma Knife.     Oncology Treatment Plan:   OP DURVALUMAB 1500 MG Q4W    Medications:  Continuous Infusions:  Scheduled Meds:   amitriptyline  25 mg Oral QHS    aspirin  81 mg Oral Daily    atorvastatin  80 mg Oral Daily    dexAMETHasone  4 mg Oral BID    enoxparin  40 mg  Subcutaneous Q24H (prophylaxis, 1700)    fluticasone propionate  2 spray Each Nostril Daily    furosemide  20 mg Oral Daily    losartan  100 mg Oral Daily    memantine  5 mg Oral BID    pantoprazole  40 mg Oral Daily    roflumilast  1 tablet Oral Daily    traZODone  50 mg Oral QHS     PRN Meds:  Current Facility-Administered Medications:     acetaminophen, 650 mg, Oral, Q6H PRN    albuterol, 2 puff, Inhalation, Q4H PRN    dextrose 50%, 12.5 g, Intravenous, PRN    dextrose 50%, 25 g, Intravenous, PRN    glucagon (human recombinant), 1 mg, Intramuscular, PRN    glucose, 16 g, Oral, PRN    glucose, 24 g, Oral, PRN    ibuprofen, 600 mg, Oral, Q6H PRN    iohexoL, 100 mL, Intravenous, ONCE PRN    naloxone, 0.02 mg, Intravenous, PRN    nitroGLYCERIN, 0.4 mg, Sublingual, Q5 Min PRN    sodium chloride 0.9%, 10 mL, Intravenous, Q12H PRN     Review of Systems  Objective:     Vital Signs (Most Recent):  Temp: 97.5 °F (36.4 °C) (02/09/25 1103)  Pulse: 94 (02/09/25 1103)  Resp: 17 (02/09/25 1103)  BP: (!) 134/93 (02/09/25 1103)  SpO2: 95 % (02/09/25 1103) Vital Signs (24h Range):  Temp:  [96.5 °F (35.8 °C)-98.5 °F (36.9 °C)] 97.5 °F (36.4 °C)  Pulse:  [] 94  Resp:  [17-20] 17  SpO2:  [94 %-97 %] 95 %  BP: (117-145)/(72-93) 134/93     Weight: 84.5 kg (186 lb 4.6 oz)  Body mass index is 27.51 kg/m².  Body surface area is 2.03 meters squared.      Intake/Output Summary (Last 24 hours) at 2/9/2025 1421  Last data filed at 2/9/2025 0939  Gross per 24 hour   Intake 240 ml   Output 350 ml   Net -110 ml        Physical Exam  Vitals reviewed.   Constitutional:       Appearance: Normal appearance.   HENT:      Head: Normocephalic and atraumatic.      Mouth/Throat:      Pharynx: Oropharynx is clear.   Eyes:      General: No scleral icterus.     Conjunctiva/sclera: Conjunctivae normal.   Cardiovascular:      Rate and Rhythm: Normal rate and regular rhythm.      Pulses: Normal pulses.      Heart sounds: Normal heart sounds.   Pulmonary:       Effort: Pulmonary effort is normal. No respiratory distress.   Abdominal:      General: Abdomen is flat.      Tenderness: There is no abdominal tenderness. There is no guarding or rebound.   Musculoskeletal:      Right lower leg: No edema.      Left lower leg: No edema.   Skin:     Coloration: Skin is not jaundiced.   Neurological:      Mental Status: He is alert and oriented to person, place, and time.   Psychiatric:         Mood and Affect: Mood normal.         Behavior: Behavior normal.          Significant Labs:   All pertinent labs from the last 24 hours have been reviewed.    Diagnostic Results:  I have reviewed and interpreted all pertinent imaging results/findings within the past 24 hours.  Assessment/Plan:     * Vision, loss, sudden, unspecified laterality  78 y/o M with PMHx of lung adenocarcinoma w/mets to brain s/p GK x 3, previously on durvalumab but stopped given recent multiple episodes of pneumonitis discharged earlier today after admission for TIA. Patient returns with reports of vision disturbance, headache, and walking difficulty. Per ED, patient reports not being able to see left visual field out of both eyes, confusion, headaches, and needing to use cane to get around. HDS. Patient reports overall vision blurriness. On examination, noted issues with tracking and convergence, no overly convincing visual field defects noted. Initial and repeat CTA stroke no evidence of acute hemorrhage or vascular distribution. Repeat MRI Brain with new T2 FLAIR signal abnormality in the subarachnoid space overlying the posterior right temporal and right occipital lobes. Ddx noninfectious vs noninfectious vs subarachnoid hemorrhage vs Leptomeningeal metastatic. Overall impression likely related related to know metastatic lesion vs side effects of GK    Plan:   - Neurology consulted, appreciate recs  - Ophthalmology consulted, appreciate recs  - Neuro checks q 4 hours  - Continue dexamethasone  - Continue  memantine       TIA (transient ischemic attack)    Plan:   - Continue home ASA + statin     Metastasis to brain  Follows w/Dr. Ponce   On Cycle 4 of Durvalumab  MRI Brain 4/3/24 demonstrated a 0.8 cm Left thalamic metastasis  MRI Brain 7/26/24 demonstrated interval increase in size to 2.2 cm; no other evidence of intracranial disease.  MRI Brain 10/7/24 demonstrated new 1.9 cm Right medial temporo-occipital metastasis.   MRI Brain 1/24/25 demonstrated a 0.4 cm posterior Right temporal lobe met   S/p Gamma Knife in 8/24, 10/24, and 1/25    Adenocarcinoma, lung, left  Follows w/Dr. Ponce   On Cycle 4 of Durvalumab  Mets to brain     CAD (coronary artery disease)    Plan:   Continue home ASA + statin     HTN (hypertension), benign    Plan:   - Continue home losartan     GERD (gastroesophageal reflux disease)    Plan:   - Continue home protonix     Centrilobular emphysema    Plan:   - Continue home roflumilast   - PRN albuterol HFA             Mawadah Samad, MD  Hematology/Oncology  [facility] ([facility])

## 2025-02-10 ENCOUNTER — TELEPHONE (OUTPATIENT)
Dept: OPHTHALMOLOGY | Facility: CLINIC | Age: 80
End: 2025-02-10
Payer: MEDICARE

## 2025-02-10 ENCOUNTER — ANESTHESIA EVENT (OUTPATIENT)
Dept: ONCOLOGY | Facility: HOSPITAL | Age: 80
End: 2025-02-10
Payer: MEDICARE

## 2025-02-10 ENCOUNTER — PATIENT OUTREACH (OUTPATIENT)
Dept: ADMINISTRATIVE | Facility: CLINIC | Age: 80
End: 2025-02-10
Payer: MEDICARE

## 2025-02-10 ENCOUNTER — ANESTHESIA (OUTPATIENT)
Dept: ONCOLOGY | Facility: HOSPITAL | Age: 80
End: 2025-02-10
Payer: MEDICARE

## 2025-02-10 ENCOUNTER — TELEPHONE (OUTPATIENT)
Dept: HEMATOLOGY/ONCOLOGY | Facility: CLINIC | Age: 80
End: 2025-02-10
Payer: MEDICARE

## 2025-02-10 PROBLEM — D84.9 IMMUNOCOMPROMISED STATE: Status: ACTIVE | Noted: 2025-02-10

## 2025-02-10 PROBLEM — D68.59 HYPERCOAGULOPATHY: Status: ACTIVE | Noted: 2025-02-10

## 2025-02-10 LAB
ALBUMIN SERPL BCP-MCNC: 3.1 G/DL (ref 3.5–5.2)
ALP SERPL-CCNC: 76 U/L (ref 40–150)
ALT SERPL W/O P-5'-P-CCNC: 14 U/L (ref 10–44)
ANION GAP SERPL CALC-SCNC: 9 MMOL/L (ref 8–16)
AST SERPL-CCNC: 11 U/L (ref 10–40)
BASOPHILS # BLD AUTO: 0.01 K/UL (ref 0–0.2)
BASOPHILS NFR BLD: 0.1 % (ref 0–1.9)
BILIRUB SERPL-MCNC: 0.5 MG/DL (ref 0.1–1)
BUN SERPL-MCNC: 30 MG/DL (ref 8–23)
CALCIUM SERPL-MCNC: 9.2 MG/DL (ref 8.7–10.5)
CHLORIDE SERPL-SCNC: 105 MMOL/L (ref 95–110)
CLARITY CSF: CLEAR
CO2 SERPL-SCNC: 26 MMOL/L (ref 23–29)
COLOR CSF: COLORLESS
CREAT SERPL-MCNC: 1.1 MG/DL (ref 0.5–1.4)
CSF TUBE NUMBER: 1
CSF TUBE NUMBER: 2
DIFFERENTIAL METHOD BLD: ABNORMAL
EOSINOPHIL # BLD AUTO: 0 K/UL (ref 0–0.5)
EOSINOPHIL NFR BLD: 0 % (ref 0–8)
ERYTHROCYTE [DISTWIDTH] IN BLOOD BY AUTOMATED COUNT: 14.2 % (ref 11.5–14.5)
EST. GFR  (NO RACE VARIABLE): >60 ML/MIN/1.73 M^2
GLUCOSE CSF-MCNC: 74 MG/DL (ref 40–70)
GLUCOSE SERPL-MCNC: 128 MG/DL (ref 70–110)
HCT VFR BLD AUTO: 36.2 % (ref 40–54)
HGB BLD-MCNC: 12.1 G/DL (ref 14–18)
IMM GRANULOCYTES # BLD AUTO: 0.11 K/UL (ref 0–0.04)
IMM GRANULOCYTES NFR BLD AUTO: 1.3 % (ref 0–0.5)
LYMPHOCYTES # BLD AUTO: 0.4 K/UL (ref 1–4.8)
LYMPHOCYTES NFR BLD: 5.2 % (ref 18–48)
MAGNESIUM SERPL-MCNC: 1.8 MG/DL (ref 1.6–2.6)
MCH RBC QN AUTO: 28.6 PG (ref 27–31)
MCHC RBC AUTO-ENTMCNC: 33.4 G/DL (ref 32–36)
MCV RBC AUTO: 86 FL (ref 82–98)
MONOCYTES # BLD AUTO: 0.4 K/UL (ref 0.3–1)
MONOCYTES NFR BLD: 4.7 % (ref 4–15)
NEUTROPHILS # BLD AUTO: 7.3 K/UL (ref 1.8–7.7)
NEUTROPHILS NFR BLD: 88.7 % (ref 38–73)
NRBC BLD-RTO: 0 /100 WBC
PHOSPHATE SERPL-MCNC: 2.9 MG/DL (ref 2.7–4.5)
PLATELET # BLD AUTO: 193 K/UL (ref 150–450)
PMV BLD AUTO: 10.8 FL (ref 9.2–12.9)
POCT GLUCOSE: 108 MG/DL (ref 70–110)
POTASSIUM SERPL-SCNC: 3.9 MMOL/L (ref 3.5–5.1)
PROT CSF-MCNC: 68 MG/DL (ref 15–40)
PROT SERPL-MCNC: 6.1 G/DL (ref 6–8.4)
RBC # BLD AUTO: 4.23 M/UL (ref 4.6–6.2)
RBC # CSF: 5 /CU MM
SODIUM SERPL-SCNC: 140 MMOL/L (ref 136–145)
SPECIMEN VOL CSF: 3.5 ML
WBC # BLD AUTO: 8.26 K/UL (ref 3.9–12.7)
WBC # CSF: 0 /CU MM (ref 0–5)

## 2025-02-10 PROCEDURE — 25000242 PHARM REV CODE 250 ALT 637 W/ HCPCS: Performed by: STUDENT IN AN ORGANIZED HEALTH CARE EDUCATION/TRAINING PROGRAM

## 2025-02-10 PROCEDURE — 94640 AIRWAY INHALATION TREATMENT: CPT

## 2025-02-10 PROCEDURE — 83735 ASSAY OF MAGNESIUM: CPT

## 2025-02-10 PROCEDURE — 80053 COMPREHEN METABOLIC PANEL: CPT | Performed by: STUDENT IN AN ORGANIZED HEALTH CARE EDUCATION/TRAINING PROGRAM

## 2025-02-10 PROCEDURE — 88108 CYTOPATH CONCENTRATE TECH: CPT | Performed by: STUDENT IN AN ORGANIZED HEALTH CARE EDUCATION/TRAINING PROGRAM

## 2025-02-10 PROCEDURE — 25000003 PHARM REV CODE 250: Performed by: STUDENT IN AN ORGANIZED HEALTH CARE EDUCATION/TRAINING PROGRAM

## 2025-02-10 PROCEDURE — 25000003 PHARM REV CODE 250

## 2025-02-10 PROCEDURE — 94660 CPAP INITIATION&MGMT: CPT

## 2025-02-10 PROCEDURE — 36415 COLL VENOUS BLD VENIPUNCTURE: CPT | Performed by: STUDENT IN AN ORGANIZED HEALTH CARE EDUCATION/TRAINING PROGRAM

## 2025-02-10 PROCEDURE — 82945 GLUCOSE OTHER FLUID: CPT

## 2025-02-10 PROCEDURE — 87206 SMEAR FLUORESCENT/ACID STAI: CPT

## 2025-02-10 PROCEDURE — 87205 SMEAR GRAM STAIN: CPT

## 2025-02-10 PROCEDURE — 87070 CULTURE OTHR SPECIMN AEROBIC: CPT

## 2025-02-10 PROCEDURE — 87116 MYCOBACTERIA CULTURE: CPT

## 2025-02-10 PROCEDURE — 63600175 PHARM REV CODE 636 W HCPCS: Performed by: STUDENT IN AN ORGANIZED HEALTH CARE EDUCATION/TRAINING PROGRAM

## 2025-02-10 PROCEDURE — 99900035 HC TECH TIME PER 15 MIN (STAT)

## 2025-02-10 PROCEDURE — 94761 N-INVAS EAR/PLS OXIMETRY MLT: CPT

## 2025-02-10 PROCEDURE — 83615 LACTATE (LD) (LDH) ENZYME: CPT

## 2025-02-10 PROCEDURE — 89051 BODY FLUID CELL COUNT: CPT

## 2025-02-10 PROCEDURE — 20600001 HC STEP DOWN PRIVATE ROOM

## 2025-02-10 PROCEDURE — 25000242 PHARM REV CODE 250 ALT 637 W/ HCPCS

## 2025-02-10 PROCEDURE — 84157 ASSAY OF PROTEIN OTHER: CPT

## 2025-02-10 PROCEDURE — 84100 ASSAY OF PHOSPHORUS: CPT

## 2025-02-10 PROCEDURE — 85025 COMPLETE CBC W/AUTO DIFF WBC: CPT | Performed by: STUDENT IN AN ORGANIZED HEALTH CARE EDUCATION/TRAINING PROGRAM

## 2025-02-10 RX ORDER — LEVETIRACETAM 500 MG/1
500 TABLET ORAL 2 TIMES DAILY
Status: DISCONTINUED | OUTPATIENT
Start: 2025-02-10 | End: 2025-02-12 | Stop reason: HOSPADM

## 2025-02-10 RX ORDER — CEFTRIAXONE 2 G/1
2 INJECTION, POWDER, FOR SOLUTION INTRAMUSCULAR; INTRAVENOUS
Status: DISCONTINUED | OUTPATIENT
Start: 2025-02-10 | End: 2025-02-10

## 2025-02-10 RX ORDER — FLUTICASONE FUROATE AND VILANTEROL 100; 25 UG/1; UG/1
1 POWDER RESPIRATORY (INHALATION) DAILY
Status: DISCONTINUED | OUTPATIENT
Start: 2025-02-10 | End: 2025-02-12 | Stop reason: HOSPADM

## 2025-02-10 RX ORDER — LATANOPROST 50 UG/ML
1 SOLUTION/ DROPS OPHTHALMIC NIGHTLY
Status: DISCONTINUED | OUTPATIENT
Start: 2025-02-10 | End: 2025-02-12 | Stop reason: HOSPADM

## 2025-02-10 RX ADMIN — DEXAMETHASONE 4 MG: 4 TABLET ORAL at 09:02

## 2025-02-10 RX ADMIN — ROFLUMILAST 500 MCG: 500 TABLET ORAL at 08:02

## 2025-02-10 RX ADMIN — ALBUTEROL SULFATE 2 PUFF: 108 INHALANT RESPIRATORY (INHALATION) at 09:02

## 2025-02-10 RX ADMIN — FUROSEMIDE 20 MG: 20 TABLET ORAL at 08:02

## 2025-02-10 RX ADMIN — TRAZODONE HYDROCHLORIDE 50 MG: 50 TABLET ORAL at 09:02

## 2025-02-10 RX ADMIN — ATORVASTATIN CALCIUM 80 MG: 40 TABLET, FILM COATED ORAL at 08:02

## 2025-02-10 RX ADMIN — ASPIRIN 81 MG: 81 TABLET, COATED ORAL at 08:02

## 2025-02-10 RX ADMIN — MEMANTINE HYDROCHLORIDE 5 MG: 5 TABLET ORAL at 08:02

## 2025-02-10 RX ADMIN — AMITRIPTYLINE HYDROCHLORIDE 25 MG: 25 TABLET, FILM COATED ORAL at 09:02

## 2025-02-10 RX ADMIN — LATANOPROST 1 DROP: 50 SOLUTION OPHTHALMIC at 09:02

## 2025-02-10 RX ADMIN — LEVETIRACETAM 500 MG: 500 TABLET, FILM COATED ORAL at 10:02

## 2025-02-10 RX ADMIN — MEMANTINE HYDROCHLORIDE 5 MG: 5 TABLET ORAL at 09:02

## 2025-02-10 RX ADMIN — DEXAMETHASONE 4 MG: 4 TABLET ORAL at 08:02

## 2025-02-10 RX ADMIN — LOSARTAN POTASSIUM 100 MG: 50 TABLET, FILM COATED ORAL at 08:02

## 2025-02-10 RX ADMIN — LEVETIRACETAM 500 MG: 500 TABLET, FILM COATED ORAL at 09:02

## 2025-02-10 RX ADMIN — FLUTICASONE FUROATE AND VILANTEROL TRIFENATATE 1 PUFF: 100; 25 POWDER RESPIRATORY (INHALATION) at 10:02

## 2025-02-10 RX ADMIN — PANTOPRAZOLE SODIUM 40 MG: 40 TABLET, DELAYED RELEASE ORAL at 08:02

## 2025-02-10 NOTE — SUBJECTIVE & OBJECTIVE
Subjective:     Interval History:   Pt reports he is feeling better today, less visual disturbance  On dex 4 mg BID (day 4)   LP completed, CSF results pending  Started on Keppra 500 mg BID for seizure ppx  Primary team discussed imaging findings with Rad Onc, feel less concern for disease progression and gamma knife related changes     Current Neurological Medications:   Keppra 500 mg BID    Current Facility-Administered Medications   Medication Dose Route Frequency Provider Last Rate Last Admin    acetaminophen tablet 650 mg  650 mg Oral Q6H PRN Zahida Garcia MD   650 mg at 02/08/25 1124    albuterol inhaler 2 puff  2 puff Inhalation Q4H PRN Zahida Garcia MD        amitriptyline tablet 25 mg  25 mg Oral QHS Zahida Garcia MD   25 mg at 02/09/25 2048    aspirin EC tablet 81 mg  81 mg Oral Daily Zahida Garcia MD   81 mg at 02/10/25 0852    atorvastatin tablet 80 mg  80 mg Oral Daily Zahida Garcia MD   80 mg at 02/10/25 0851    dexAMETHasone tablet 4 mg  4 mg Oral BID Zahida Garcia MD   4 mg at 02/10/25 0852    dextrose 50% injection 12.5 g  12.5 g Intravenous PRN Zahida Garcia MD        dextrose 50% injection 25 g  25 g Intravenous PRN Zahida Garcia MD        fluticasone furoate-vilanteroL 100-25 mcg/dose diskus inhaler 1 puff  1 puff Inhalation Daily Deuce Patiño MD   1 puff at 02/10/25 1039    fluticasone propionate 50 mcg/actuation nasal spray 100 mcg  2 spray Each Nostril Daily Zahida Garcia MD   100 mcg at 02/08/25 1123    furosemide tablet 20 mg  20 mg Oral Daily Zahida Garcia MD   20 mg at 02/10/25 0852    glucagon (human recombinant) injection 1 mg  1 mg Intramuscular PRN Zahida Garcia MD        glucose chewable tablet 16 g  16 g Oral PRN Zahida Garcia MD        glucose chewable tablet 24 g  24 g Oral PRN Zahida Garcia MD        ibuprofen tablet 600 mg  600 mg Oral Q6H PRN Samad, Mawadah, MD        iohexoL  (OMNIPAQUE 350) injection 100 mL  100 mL Intravenous ONCE PRN Loraine Mo MD        latanoprost 0.005 % ophthalmic solution 1 drop  1 drop Both Eyes QHS Deuce Patiño MD        levETIRAcetam tablet 500 mg  500 mg Oral BID Deuce Patiño MD   500 mg at 02/10/25 1046    losartan tablet 100 mg  100 mg Oral Daily Zahida Garcia MD   100 mg at 02/10/25 0852    memantine tablet 5 mg  5 mg Oral BID Samad, Mawadah, MD   5 mg at 02/10/25 0852    naloxone 0.4 mg/mL injection 0.02 mg  0.02 mg Intravenous PRN Zahida Garcia MD        nitroGLYCERIN SL tablet 0.4 mg  0.4 mg Sublingual Q5 Min PRN Zahida Garcia MD        pantoprazole EC tablet 40 mg  40 mg Oral Daily Zahida Garcia MD   40 mg at 02/10/25 0852    roflumilast (DALIRESP) tablet 500 mcg  1 tablet Oral Daily Zahida Garcia MD   500 mcg at 02/10/25 0852    sodium chloride 0.9% flush 10 mL  10 mL Intravenous Q12H PRN Zahida Garcia MD        [START ON 2/11/2025] tiotropium bromide 2.5 mcg/actuation inhaler 2 puff  2 puff Inhalation Daily Deuce Patiño MD        traZODone tablet 50 mg  50 mg Oral QHS Zahida Garcia MD   50 mg at 02/09/25 2048     Facility-Administered Medications Ordered in Other Encounters   Medication Dose Route Frequency Provider Last Rate Last Admin    dextrose 50% injection 12.5 g  12.5 g Intravenous PRN Lidia Deleon MD        dextrose 50% injection 25 g  25 g Intravenous PRN Lidia Deleon MD        insulin regular injection 0-8 Units  0-8 Units Intravenous Continuous PRN Lidia Deleon MD         Review of Systems   Constitutional:  Positive for activity change and fatigue. Negative for fever.   HENT:  Negative for trouble swallowing and voice change.    Eyes:  Positive for visual disturbance. Negative for photophobia, pain and redness.   Respiratory:  Negative for cough and shortness of breath.    Cardiovascular:  Negative for chest pain and leg swelling.    Gastrointestinal:  Negative for nausea and vomiting.   Musculoskeletal:  Negative for gait problem and neck stiffness.   Neurological:  Negative for dizziness, tremors, speech difficulty, weakness and headaches.   Psychiatric/Behavioral:  Negative for confusion.      Objective:     Vital Signs (Most Recent):  Temp: 97.9 °F (36.6 °C) (02/10/25 0746)  Pulse: 81 (02/10/25 1515)  Resp: 18 (02/10/25 1039)  BP: (!) 143/83 (02/10/25 0746)  SpO2: 97 % (02/10/25 1300) Vital Signs (24h Range):  Temp:  [97.8 °F (36.6 °C)-98.4 °F (36.9 °C)] 97.9 °F (36.6 °C)  Pulse:  [73-92] 81  Resp:  [15-18] 18  SpO2:  [95 %-99 %] 97 %  BP: (130-143)/(67-83) 143/83     Weight: 84.5 kg (186 lb 4.6 oz)  Body mass index is 27.51 kg/m².     Physical Exam  Eyes:      Extraocular Movements: EOM normal.      Pupils: Pupils are equal, round, and reactive to light.   Neurological:      Mental Status: He is oriented to person, place, and time.      Deep Tendon Reflexes:      Reflex Scores:       Bicep reflexes are 2+ on the right side and 2+ on the left side.       Brachioradialis reflexes are 2+ on the right side.       Patellar reflexes are 2+ on the right side and 2+ on the left side.  Psychiatric:         Speech: Speech normal.          NEUROLOGICAL EXAMINATION:     MENTAL STATUS   Oriented to person, place, and time.   Follows 2 step commands.   Attention: normal. Concentration: normal.   Speech: speech is normal   Level of consciousness: alert  Knowledge: good.   Normal comprehension.     CRANIAL NERVES     CN II   Left visual field deficit: upper temporal and upper nasal quadrant(s)    CN III, IV, VI   Pupils are equal, round, and reactive to light.  Extraocular motions are normal.     MOTOR EXAM   Muscle bulk: normal  Overall muscle tone: normal  Right arm pronator drift: absent  Left arm pronator drift: absent    Strength   Right deltoid: 5/5  Left deltoid: 5/5  Right biceps: 5/5  Left biceps: 5/5  Right triceps: 5/5  Left triceps:  5/5  Right interossei: 5/5  Left interossei: 5/5  Right iliopsoas: 5/5  Left iliopsoas: 5/5  Right quadriceps: 5/5  Left quadriceps: 5/5  Right anterior tibial: 5/5  Left anterior tibial: 5/5  Right posterior tibial: 5/5  Left posterior tibial: 5/5  Right peroneal: 5/5  Left peroneal: 5/5    REFLEXES     Reflexes   Right brachioradialis: 2+  Right biceps: 2+  Left biceps: 2+  Right patellar: 2+  Left patellar: 2+    SENSORY EXAM   Light touch normal.     GAIT AND COORDINATION     Tremor   Resting tremor: absent    Significant Labs: All pertinent lab results from the past 24 hours have been reviewed.    CSF (2/10/25) pending    Significant Imaging: I have reviewed all pertinent imaging results/findings within the past 24 hours.    MRI Brain WO contrast (2/8/25):  Compared to recent MRI of the brain performed 02/07/2025, new T2 FLAIR signal abnormality in the subarachnoid space overlying the posterior right temporal and right occipital lobes.  Infectious or noninfectious inflammatory meningitis difficult centered.  Small volume subarachnoid hemorrhage would additionally be considered, noting that there is no significant susceptibility-related signal loss in this region.  Leptomeningeal metastatic disease would be considered somewhat less likely given relatively rapid change in appearance since the recent MRI.  No acute infarct.Stable edema around node metastatic lesion within the right temporal occipital junction.  Remaining lesions are less visible in the absence of intravenous contrast.

## 2025-02-10 NOTE — CONSULTS
"Consultation Report  Ophthalmology Service    Date: 02/09/2025    Chief complaint/Reason for Consult: "Vision changes, blurry vision, double vision, poor color vision. Per ED, had completely left visual loss from both eyes"     History of Present Illness: Jordin Allen Jr. is a 79 y.o. 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 The patient was discharged 2/7 and represented same day for TIA concerns and returned with vision disturbance, headache, and difficulty walking.     Per ED, the patient described sudden inability to see his left visual field in both eyes, overall blurriness, and an episode of confusion while driving home from the hospital earlier on 2/7, stating he "did not know what street he was on," which is atypical for him. Additionally, his daughter reports progressive slowing of movement over the last few days, increased cane use, and behavioral changes including irritability. No reported extremity weakness, sensory deficits, falls, or speech changes.    He was found face down the night prior to admission with a temporary right gaze deviation and worsening left-sided weakness, prompting inpatient evaluation. MRI was negative for acute CVA, and he was discharged on Decadron with outpatient EEG planned.      CTA stroke was negative for hemorrhage or large vessel occlusion. However, repeat MRI revealed a new T2 FLAIR hyperintensity in the subarachnoid space overlying the posterior right temporal and right occipital lobes, adjacent to known metastatic lesions.    Patient endorses left sided visual disturbances, feels like its his left eye. No flashes/floaters/curtains/veils. No blurry vision. Reports seeing some formed afterimages and shapes in his left vision. Says this all started roughly 2 days ago with the decreased vision and changes to his color vision.    POcularHx: " history of CEIOL OU, Yag Cap OU, glaucoma OU Does not use glasses or contacts.     Current eye gtts: Latanoprost OU      PMHx:  has a past medical history of Benign neoplasm of colon (10/01/2013), Bronchitis chronic, CAD (coronary artery disease) (10/31/2013), COPD (chronic obstructive pulmonary disease), Emphysema of lung, Encounter for preventive health examination (3/21/2018), GERD (gastroesophageal reflux disease), colonic polyps, Hypertension, NAFLD (nonalcoholic fatty liver disease) (03/27/2017), SHOLA on CPAP, RLS (restless legs syndrome), and Screen for colon cancer (08/30/2013).     PSurgHx:  has a past surgical history that includes Cataract extraction, bilateral; bilateral foot surgery (1993); Colon surgery (2013); Cardiac catheterization (2013); Colonoscopy (N/A, 3/21/2018); scrotal abscess removal; Colonoscopy (N/A, 4/12/2019); Colonoscopy (N/A, 5/31/2022); and Endobronchial ultrasound (Bilateral, 4/30/2024).     Home Medications:   Prior to Admission medications    Medication Sig Start Date End Date Taking? Authorizing Provider   albuterol (ACCUNEB) 0.63 mg/3 mL Nebu Inhale 3 mLs (0.63 mg total) by nebulization every 6 (six) hours as needed (if symptoms failed to improve with inhaler). Rescue 12/10/24   Bienvenido Ward MD   albuterol (PROVENTIL/VENTOLIN HFA) 90 mcg/actuation inhaler Inhale 2 puffs into the lungs every 4 (four) hours as needed for Wheezing. 12/10/24   Bienvenido Ward MD   amitriptyline (ELAVIL) 25 MG tablet Take 1 tablet (25 mg total) by mouth once daily. 9/3/24   Sierra Landry MD   aspirin (ECOTRIN) 81 MG EC tablet Take 81 mg by mouth once daily.     Provider, Historical   atorvastatin (LIPITOR) 80 MG tablet TAKE 1 TABLET (80 MG TOTAL) BY MOUTH ONCE DAILY. 3/18/24   Prince Ba MD   BETA-CAROTENE,A, W-C & E/MIN (OCUVITE ORAL) Take 1 capsule by mouth once daily.     Provider, Historical   budesonide-glycopyr-formoterol (BREZTRI AEROSPHERE) 160-9-4.8 mcg/actuation  HFAA Inhale 2 puffs into the lungs 2 (two) times a day. 12/10/24   Bienvenido Ward MD   dexAMETHasone (DECADRON) 4 MG Tab Take 1 tablet (4 mg total) by mouth 2 (two) times daily. 2/6/25   Bienvenido Henson MD   echinacea 400 mg Cap Take 1 capsule by mouth once daily.     Provider, Historical   fluticasone propionate (FLONASE) 50 mcg/actuation nasal spray Spray 2 sprays (100 mcg total) in Each Nostril once daily. 10/23/24   Caren Shah MD   furosemide (LASIX) 20 MG tablet Take 1 tablet (20 mg total) by mouth once daily. 1/6/25 3/7/25  Garrett Lloyd DPM   latanoprost 0.005 % ophthalmic solution Place 1 drop into both eyes once daily.  10/6/15   Provider, Historical   latanoprost 0.005 % ophthalmic solution Instill 1 drop into both eyes every night at bedtime 1/30/25      losartan (COZAAR) 100 MG tablet Take 1 tablet (100 mg total) by mouth once daily. 12/19/24   Prince Ba MD   nitroGLYCERIN (NITROSTAT) 0.4 MG SL tablet Place 1 tablet (0.4 mg total) under the tongue every 5 (five) minutes as needed. 5/6/24   Prince Ba MD   omeprazole (PRILOSEC) 20 MG capsule Take 1 capsule (20 mg total) by mouth once daily. 12/10/24   Bienvenido Ward MD   roflumilast (DALIRESP) 500 mcg Tab Take 1 tablet (500 mcg total) by mouth once daily. 12/10/24   Bienvenido Ward MD   senna (SENOKOT) 8.6 mg tablet Take 1 tablet by mouth once daily. 11/13/24 5/12/25  Caren Shah MD   traZODone (DESYREL) 50 MG tablet Take 1 tablet (50 mg total) by mouth every evening. 1/14/25   Lou Muro NP        Medications this encounter:    amitriptyline  25 mg Oral QHS    aspirin  81 mg Oral Daily    atorvastatin  80 mg Oral Daily    dexAMETHasone  4 mg Oral BID    enoxparin  40 mg Subcutaneous Q24H (prophylaxis, 1700)    fluticasone propionate  2 spray Each Nostril Daily    furosemide  20 mg Oral Daily    losartan  100 mg Oral Daily    memantine  5 mg Oral BID    pantoprazole  40 mg Oral Daily    roflumilast  1  tablet Oral Daily    traZODone  50 mg Oral QHS       Allergies: is allergic to brilinta [ticagrelor], lisinopril, and metoprolol succinate.     Social:  reports that he quit smoking about 12 years ago. His smoking use included cigarettes. He started smoking about 52 years ago. He has a 40 pack-year smoking history. He has never been exposed to tobacco smoke. He has never used smokeless tobacco. He reports current alcohol use of about 3.0 standard drinks of alcohol per week. He reports that he does not use drugs.     Family Hx: No family history of glaucoma, macular degeneration, or blindness. family history includes Heart attack in his mother; Heart disease in his mother; Hypertension in his mother; No Known Problems in his daughter and sister.     ROS: Negative x 10 except for complaints as described in HPI; negative for fever, chills, weight loss, nausea, vomiting, diarrhea, shortness of breath, nasal discharge, cough, abdominal pain, dyspnea, difficulty moving arms and legs, dysuria, palpitations, or chest pain     Ocular examination/Dilated fundus examination:  Base Eye Exam       Visual Acuity (Snellen - Linear)         Right Left    Dist sc 20/40 +2 20/30 -2    Dist ph sc 20/30 20/25              Tonometry (Tonopen, 6:56 PM)         Right Left    Pressure 25 20              Pupils         Pupils Dark Light Shape React APD    Right PERRL 3 2 Round Brisk None    Left PERRL 3 2 Round Brisk None              Visual Fields (Counting fingers)         Right Left    Restrictions Total superior nasal, inferior nasal deficiencies Total superior temporal, inferior temporal deficiencies              Extraocular Movement         Right Left     Full, Ortho Full, Ortho              Neuro/Psych       Oriented x3: Yes    Mood/Affect: Normal              Dilation       Both eyes: 1% Mydriacyl, 2.5% Phenylephrine @ 6:55 PM                  Additional Tests       Color         Right Left    [place]sh[place] 11/14 11/14   Some color  deficiencies noted, inconsistent answers given for color testing for those marked as incorrect (usually noted to be a double digit number with difficulty with one digit while getting the other correct)                 Slit Lamp and Fundus Exam       External Exam         Right Left    External Normal Normal              Slit Lamp Exam         Right Left    Lids/Lashes Normal Normal    Conjunctiva/Sclera White and quiet White and quiet    Cornea Clear Clear    Anterior Chamber Deep and formed Deep and formed    Iris Round and reactive Round and reactive    Lens Posterior chamber intraocular lens Posterior chamber intraocular lens    Anterior Vitreous Normal Normal              Fundus Exam         Right Left    Disc Normal, sharp pink Normal, sharp pink    C/D Ratio 0.2 0.2    Macula flat and attached flat and attached    Vessels Normal Normal    Periphery flat attached, no h/t/d infrotemporal degeneration and CR, infranasal large nevus 3DD, no pigment, flat flat attached, no h/t/d infero temporal CRS degeneration                    MRI Brain without contrast 2/8/25  Impression:  Compared to recent MRI of the brain performed 02/07/2025, new T2 FLAIR signal abnormality in the subarachnoid space overlying the posterior right temporal and right occipital lobes.  Infectious or noninfectious inflammatory meningitis difficult centered.  Small volume subarachnoid hemorrhage would additionally be considered, noting that there is no significant susceptibility-related signal loss in this region.  CSF sampling and/or a short-term follow-up noncontrast head CT may be of benefit in this regard.  Leptomeningeal metastatic disease would be considered somewhat less likely given relatively rapid change in appearance since the recent MRI.  No acute infarct.  Stable edema around node metastatic lesion within the right temporal occipital junction.  Remaining lesions are less visible in the absence of intravenous  contrast.    Assessment/Plan:   1. 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  - He was found face down the night prior to admission with a temporary right gaze deviation and worsening left-sided weakness, prompting inpatient evaluation.   - [facility] wnl 20/30 // 20/25, IOP wnl, pupils equal no APD, EOM full  - Noted repeatable decreased left visual field on confrontation in both eyes  - Consistent with documented new signal abnormality in the subarachnoid space overlying the posterior right temporal and right occipital lobes.  - Some color deficiencies noted 11/14 OU, inconsistent answers given for color testing for those marked as incorrect (usually noted to be a double digit number with difficulty with one digit while getting the other correct)  - Patient with history of multiple immunotherapies and gamma knife therapy  - completed CRT (5/8/24-6/18/24) with weekly carboplatin/paclitaxel (5/9/24-6/18/24), currently on consolidation durvalumab (7/18/24 - present; held 9/26/24-12/6/24) due to likely radiation pneumonitis), w/ thalamic met proven radiographically (7/26/24) s/p GK (27 Gy/3 Fx, 8/2/24-8/7/24), and R temporal met proven radiographically (10/7/24) s/p GK (20 Gy/1Fx, 10/11/24)   - DFE wnl optic discs sharp and pink (of note takes 4-6 weeks for optic disc pallor to present on exam if considering potential optic neuropathy  secondary to long history of chemotherapy  medications)  - Visual disturbance likely secondary to ongoing brain  metastasis, no primary ocular pathology noted    2. Primary open angle glaucoma both eyes  - takes latanoprost at home  - history of glaucoma    Recommendations  - No acute ophthalmology intervention, will have patient follow up outpatient with neuro ophthalmology for formal visual field testing in 2-3 months  - Continued management per Primary and Neurology  - Restart patient's latanoprost both eyes QHS  - Ophthalmology will follow peripherally    Patient's best contact number - [phone] (daughter)    If there are further questions, please page the on call ophthalmology resident.    Prince Jimenez MD  PGY2, Ophthalmology Resident  02/09/2025  6:57 PM

## 2025-02-10 NOTE — CARE UPDATE
I have reviewed the chart of Jordin Allen Jr. who is hospitalized for the following:    Active Hospital Problems    Diagnosis    *Unspecified visual disturbance    Hypercoagulopathy     Adenocarcinoma, lung, left with brain mets  AC therapy         TIA (transient ischemic attack)    Metastasis to brain    Adenocarcinoma, lung, left    Aortic atherosclerosis     Taking atorvastatin      Chronic obstructive pulmonary disease     Taking symbicort and spiriva and daliresp  Peak flow 270 l/min In April his Fev-1: 1.33 liters 47%.       History of smoking 30 or more pack years    CAD (coronary artery disease)     -[facility] (10/31/13) for stemi: pLAD 100%, Cx with Li's, mRCA 40%, LVEF 55%,. LVEDP 15   -Xience 3.0 x 33 mm to pLAD, post dilated with 3.5 NC and 4.0 NC.   -TTE (8/14/13): LVEF 55%, grade I diastolic dysfunction      HTN (hypertension), benign    SHOLA (obstructive sleep apnea)     CPAP at night      GERD (gastroesophageal reflux disease)    Centrilobular emphysema        TAVON DELGADO  Unit Based [facility]

## 2025-02-10 NOTE — CARE UPDATE
Unit [facility] Care Support Interaction      I have reviewed the chart of Jordin RONDON Tiffany  who is hospitalized for Unspecified visual disturbance. The patient is currently located in the following unit: ONC/BMT        I have assisted the primary physician in management of the following:      MRSA Decolonization - CHG ordered       TAVON DELGADO  Unit Based [facility]

## 2025-02-10 NOTE — PROGRESS NOTES
[facility] ([facility])  Hematology/Oncology  Progress Note    Patient Name: Jordin Allen Jr.  Admission Date: 2/7/2025  Hospital Length of Stay: 3 days  Code Status: Full Code     Subjective:     HPI:  Jordin Allen, 78 y/o Male with PMHx of lung cancer with brain Mets, HTN, HLD, CAD, obesity, ex-smoker, presents after discharge earlier in the day due to vision loss in his left eye.      Patient discharged earlier in the day after admission for TIA concerns. He was found face down on the ground last night and was noted to have a temporary gaze deficit to the right and worsening left sided weakness. MRI brain w/wo obtained that was negative for acute CVA. Recommended to have outpatient EEG. Discharged on Decadron.      Patient returned home and was changing clothes with his daughter. Daughter reports he suddenly said that he lost his vision. Describes not being able to see his left visual field out of both eyes. LKN was 2:30pm. Denies eye pain. Denies extremity weakness or sensory changes. No speech changes. No falls. Daughter reports he has been moving slower over the last few days and needing a cane. Also reports he has been easily agitated and inpatient. She did mention an episode of confusion while driving home from the hospital today. Reports he did not know what street he was on which is unusual for him. In the ED, vascular neuro consulted who recommended repeat MRI and EEG.    Interval History:  No acute events overnight.  We will hold Lovenox and go for lumbar puncture today with IM6.     Oncology Treatment Plan:   OP DURVALUMAB 1500 MG Q4W    Medications:  Continuous Infusions:  Scheduled Meds:   amitriptyline  25 mg Oral QHS    aspirin  81 mg Oral Daily    atorvastatin  80 mg Oral Daily    dexAMETHasone  4 mg Oral BID    fluticasone furoate-vilanteroL  1 puff Inhalation Daily    fluticasone propionate  2 spray Each Nostril Daily    furosemide  20 mg Oral Daily    latanoprost  1 drop Both Eyes  QHS    levETIRAcetam  500 mg Oral BID    losartan  100 mg Oral Daily    memantine  5 mg Oral BID    pantoprazole  40 mg Oral Daily    roflumilast  1 tablet Oral Daily    [START ON 2/11/2025] tiotropium bromide  2 puff Inhalation Daily    traZODone  50 mg Oral QHS     PRN Meds:  Current Facility-Administered Medications:     acetaminophen, 650 mg, Oral, Q6H PRN    albuterol, 2 puff, Inhalation, Q4H PRN    dextrose 50%, 12.5 g, Intravenous, PRN    dextrose 50%, 25 g, Intravenous, PRN    glucagon (human recombinant), 1 mg, Intramuscular, PRN    glucose, 16 g, Oral, PRN    glucose, 24 g, Oral, PRN    ibuprofen, 600 mg, Oral, Q6H PRN    iohexoL, 100 mL, Intravenous, ONCE PRN    naloxone, 0.02 mg, Intravenous, PRN    nitroGLYCERIN, 0.4 mg, Sublingual, Q5 Min PRN    sodium chloride 0.9%, 10 mL, Intravenous, Q12H PRN     Review of Systems  Objective:     Vital Signs (Most Recent):  Temp: 97.9 °F (36.6 °C) (02/10/25 0746)  Pulse: 87 (02/10/25 1220)  Resp: 18 (02/10/25 1039)  BP: (!) 143/83 (02/10/25 0746)  SpO2: 96 % (02/10/25 1039) Vital Signs (24h Range):  Temp:  [97.8 °F (36.6 °C)-98.4 °F (36.9 °C)] 97.9 °F (36.6 °C)  Pulse:  [73-95] 87  Resp:  [15-18] 18  SpO2:  [95 %-99 %] 96 %  BP: (130-143)/(67-89) 143/83     Weight: 84.5 kg (186 lb 4.6 oz)  Body mass index is 27.51 kg/m².  Body surface area is 2.03 meters squared.      Intake/Output Summary (Last 24 hours) at 2/10/2025 1237  Last data filed at 2/10/2025 1105  Gross per 24 hour   Intake 360 ml   Output 1450 ml   Net -1090 ml        Physical Exam  Vitals reviewed.   Constitutional:       Appearance: Normal appearance.   HENT:      Head: Normocephalic and atraumatic.      Mouth/Throat:      Pharynx: Oropharynx is clear.   Eyes:      General: No scleral icterus.     Conjunctiva/sclera: Conjunctivae normal.   Cardiovascular:      Rate and Rhythm: Normal rate and regular rhythm.      Pulses: Normal pulses.      Heart sounds: Normal heart sounds.   Pulmonary:      Effort:  Pulmonary effort is normal. No respiratory distress.   Abdominal:      General: Abdomen is flat.      Tenderness: There is no abdominal tenderness. There is no guarding or rebound.   Musculoskeletal:      Right lower leg: No edema.      Left lower leg: No edema.   Skin:     Coloration: Skin is not jaundiced.   Neurological:      Mental Status: He is alert and oriented to person, place, and time.   Psychiatric:         Mood and Affect: Mood normal.         Behavior: Behavior normal.          Significant Labs:   All pertinent labs from the last 24 hours have been reviewed.    Diagnostic Results:  I have reviewed and interpreted all pertinent imaging results/findings within the past 24 hours.  Assessment/Plan:     * Unspecified visual disturbance  80 y/o M with PMHx of lung adenocarcinoma w/mets to brain s/p GK x 3, previously on durvalumab but stopped given recent multiple episodes of pneumonitis discharged earlier today after admission for TIA. Patient returns with reports of vision disturbance, headache, and walking difficulty. Per ED, patient reports not being able to see left visual field out of both eyes, confusion, headaches, and needing to use cane to get around. HDS. Patient reports overall vision blurriness. On examination, noted issues with tracking and convergence, no overly convincing visual field defects noted. Initial and repeat CTA stroke no evidence of acute hemorrhage or vascular distribution. Repeat MRI Brain with new T2 FLAIR signal abnormality in the subarachnoid space overlying the posterior right temporal and right occipital lobes. Ddx noninfectious vs noninfectious vs subarachnoid hemorrhage vs Leptomeningeal metastatic. Overall impression likely related related to know metastatic lesion vs side effects of GK versus meningitis, will evaluate with LP    Plan:   - Neurology consulted, recommending empiric treatment for meningitis, we will start after LP.  - Ophthalmology consulted, recommend  outpatient follow-up, continuing home lantoprost drops  - Neuro checks q 4 hours  - Continue dexamethasone  - Continue memantine   -F/u LP      TIA (transient ischemic attack)    Plan:   - Continue home ASA + statin     Metastasis to brain  Follows w/Dr. Ponce   On Cycle 4 of Durvalumab  MRI Brain 4/3/24 demonstrated a 0.8 cm Left thalamic metastasis  MRI Brain 7/26/24 demonstrated interval increase in size to 2.2 cm; no other evidence of intracranial disease.  MRI Brain 10/7/24 demonstrated new 1.9 cm Right medial temporo-occipital metastasis.   MRI Brain 1/24/25 demonstrated a 0.4 cm posterior Right temporal lobe met   S/p Gamma Knife in 8/24, 10/24, and 1/25    Adenocarcinoma, lung, left  Follows w/Dr. Ponce   On Cycle 4 of Durvalumab  Mets to brain     CAD (coronary artery disease)    Plan:   Continue home ASA + statin     HTN (hypertension), benign    Plan:   - Continue home losartan     GERD (gastroesophageal reflux disease)    Plan:   - Continue home protonix     Centrilobular emphysema    Plan:   - Continue home roflumilast   - PRN albuterol HFA             Deuce Patiño MD  Hematology/Oncology  [facility] ([facility])

## 2025-02-10 NOTE — SUBJECTIVE & OBJECTIVE
Past Medical History:   Diagnosis Date    Benign neoplasm of colon 10/01/2013    Bronchitis chronic     CAD (coronary artery disease) 10/31/2013    COPD (chronic obstructive pulmonary disease)     Emphysema of lung     Encounter for preventive health examination 3/21/2018    GERD (gastroesophageal reflux disease)     Hx of colonic polyps     Hypertension     NAFLD (nonalcoholic fatty liver disease) 03/27/2017    SHOLA on CPAP     RLS (restless legs syndrome)     Screen for colon cancer 08/30/2013       Past Surgical History:   Procedure Laterality Date    bilateral foot surgery  1993    CARDIAC CATHETERIZATION  2013    x1    CATARACT EXTRACTION, BILATERAL      COLON SURGERY  2013    Ace Mott MD    COLONOSCOPY N/A 3/21/2018    Procedure: COLONOSCOPY;  Surgeon: Terry Mott MD;  Location: [facility] ENDO (4TH FLR);  Service: Endoscopy;  Laterality: N/A;    COLONOSCOPY N/A 4/12/2019    Procedure: COLONOSCOPY;  Surgeon: John Mantilla MD;  Location: [facility] ENDO (4TH FLR);  Service: Endoscopy;  Laterality: N/A;    COLONOSCOPY N/A 5/31/2022    Procedure: COLONOSCOPY;  Surgeon: MEDINA Farris MD;  Location: [facility] (4TH FLR);  Service: Endoscopy;  Laterality: N/A;  fully vacc-inst portal-tb    ENDOBRONCHIAL ULTRASOUND Bilateral 4/30/2024    Procedure: ENDOBRONCHIAL ULTRASOUND (EBUS);  Surgeon: Naveed Aguero MD;  [facility];  Service: Pulmonary;  Laterality: Bilateral;    scrotal abscess removal         Review of patient's allergies indicates:   Allergen Reactions    Brilinta [ticagrelor] Itching    Lisinopril      Other reaction(s): cough    Metoprolol succinate Rash       Current Facility-Administered Medications on File Prior to Encounter   Medication    dextrose 50% injection 12.5 g    dextrose 50% injection 25 g    insulin regular injection 0-8 Units     Current Outpatient Medications on File Prior to Encounter   Medication Sig    albuterol (ACCUNEB) 0.63 mg/3 mL Nebu Inhale 3 mLs (0.63 mg total) by  nebulization every 6 (six) hours as needed (if symptoms failed to improve with inhaler). Rescue    albuterol (PROVENTIL/VENTOLIN HFA) 90 mcg/actuation inhaler Inhale 2 puffs into the lungs every 4 (four) hours as needed for Wheezing.    amitriptyline (ELAVIL) 25 MG tablet Take 1 tablet (25 mg total) by mouth once daily.    aspirin (ECOTRIN) 81 MG EC tablet Take 81 mg by mouth once daily.     atorvastatin (LIPITOR) 80 MG tablet TAKE 1 TABLET (80 MG TOTAL) BY MOUTH ONCE DAILY.    BETA-CAROTENE,A, W-C & E/MIN (OCUVITE ORAL) Take 1 capsule by mouth once daily.     budesonide-glycopyr-formoterol (BREZTRI AEROSPHERE) 160-9-4.8 mcg/actuation HFAA Inhale 2 puffs into the lungs 2 (two) times a day.    dexAMETHasone (DECADRON) 4 MG Tab Take 1 tablet (4 mg total) by mouth 2 (two) times daily.    echinacea 400 mg Cap Take 1 capsule by mouth once daily.     fluticasone propionate (FLONASE) 50 mcg/actuation nasal spray Spray 2 sprays (100 mcg total) in Each Nostril once daily.    furosemide (LASIX) 20 MG tablet Take 1 tablet (20 mg total) by mouth once daily.    latanoprost 0.005 % ophthalmic solution Place 1 drop into both eyes once daily.     latanoprost 0.005 % ophthalmic solution Instill 1 drop into both eyes every night at bedtime    losartan (COZAAR) 100 MG tablet Take 1 tablet (100 mg total) by mouth once daily.    nitroGLYCERIN (NITROSTAT) 0.4 MG SL tablet Place 1 tablet (0.4 mg total) under the tongue every 5 (five) minutes as needed.    omeprazole (PRILOSEC) 20 MG capsule Take 1 capsule (20 mg total) by mouth once daily.    roflumilast (DALIRESP) 500 mcg Tab Take 1 tablet (500 mcg total) by mouth once daily.    senna (SENOKOT) 8.6 mg tablet Take 1 tablet by mouth once daily.    traZODone (DESYREL) 50 MG tablet Take 1 tablet (50 mg total) by mouth every evening.     Family History       Problem Relation (Age of Onset)    Heart attack Mother    Heart disease Mother    Hypertension Mother    No Known Problems Sister,  Daughter          Tobacco Use    Smoking status: Former     Current packs/day: 0.00     Average packs/day: 1 pack/day for 40.0 years (40.0 ttl pk-yrs)     Types: Cigarettes     Start date: 8/15/1972     Quit date: 8/15/2012     Years since quittin.4     Passive exposure: Never    Smokeless tobacco: Never   Substance and Sexual Activity    Alcohol use: Yes     Alcohol/week: 3.0 standard drinks of alcohol     Types: 3 Cans of beer per week     Comment: maybe 2-3  beers weekly    Drug use: No    Sexual activity: Yes     Partners: Female     Review of Systems   Constitutional:  Negative for activity change.   HENT:  Negative for hearing loss and sore throat.    Eyes:  Positive for visual disturbance.   Respiratory:  Negative for cough and shortness of breath.    Cardiovascular:  Negative for chest pain.   Gastrointestinal:  Negative for abdominal pain.   Genitourinary:  Negative for dysuria.   Musculoskeletal:  Negative for arthralgias.   Skin:  Negative for color change.   Neurological:  Negative for headaches.   Psychiatric/Behavioral:  Negative for confusion.      Objective:     Vital Signs (Most Recent):  Temp: 98 °F (36.7 °C) (25 1535)  Pulse: 95 (25 1535)  Resp: 18 (25 1535)  BP: 130/89 (25 1535)  SpO2: 97 % (25 1535) Vital Signs (24h Range):  Temp:  [96.5 °F (35.8 °C)-98.5 °F (36.9 °C)] 98 °F (36.7 °C)  Pulse:  [72-96] 95  Resp:  [17-20] 18  SpO2:  [94 %-97 %] 97 %  BP: (117-145)/(72-93) 130/89     Weight: 84.5 kg (186 lb 4.6 oz)  Body mass index is 27.51 kg/m².     Physical Exam  Vitals reviewed.   Constitutional:       Appearance: Normal appearance.   HENT:      Head: Normocephalic and atraumatic.      Mouth/Throat:      Pharynx: Oropharynx is clear.   Eyes:      General: No scleral icterus.  Cardiovascular:      Rate and Rhythm: Normal rate.      Pulses: Normal pulses.   Pulmonary:      Effort: Pulmonary effort is normal. No respiratory distress.   Abdominal:      General:  Abdomen is flat.   Skin:     Coloration: Skin is not jaundiced.   Neurological:      Mental Status: He is alert.       LOC: alert  Attention Span: Good   Language: No aphasia  Articulation: No dysarthria  Orientation: Person, Place, Time   Visual Fields: Superior quadrantanopsia: left  EOM (CN III, IV, VI): Full/intact  Pupils (CN II, III): PERRL  Facial Sensation (CN V): Normal  Facial Movement (CN VII): Symmetric facial expression    Reflexes: 2+ throughout  Motor: Arm left  Normal 5/5  Leg left  Normal 5/5  Arm right  Normal 5/5  Leg right Normal 5/5  Cerebellum: No evidence of appendicular or axial ataxia  Sensation: Intact to light touch  Tone: Normal tone throughout              Significant Labs: All pertinent lab results from the past 24 hours have been reviewed.    Significant Imaging: I have reviewed all pertinent imaging results/findings within the past 24 hours.

## 2025-02-10 NOTE — ASSESSMENT & PLAN NOTE
79 y.o. M with known brain mets from lung adenocarcinoma, s/p multiple GK treatments, presented 2/7/25 after recent discharge for TIA concerns with new onset L sided visual deficits, confusion, and gait instability. He initially presented with right gaze deviation and left-sided weakness, though MRI was negative for acute CVA. He was discharged on Decadron with outpatient EEG planned. On return, he reported sudden left-sided visual loss in both eyes and an episode of confusion while driving.  Repeat MRI showed new T2 FLAIR hyperintensity in the posterior right temporal and occipital subarachnoid space, adjacent to a known metastatic lesion. Given the rapid radiographic change, considerations include noninfectious inflammatory changes, subarachnoid hemorrhage, or leptomeningeal metastasis. Symptoms localize to the right occipital and possibly temporal lobes, corresponding with his new MRI findings. The etiology is likely related to his known metastatic disease vs infection given rapid onset. Radiation necrosis or treatment-related edema are possible, but the presence of subarachnoid FLAIR changes also raises concern for infectious, inflammatory, or leptomeningeal metastasis. Serial imaging shows worsening right occipital and posterior temporal T2 FLAIR hyperintensity without restricted diffusion on DWI, arguing against acute infarction. SWI does not reveal hemorrhagic signal, making a large SAH less likely, though subtle SAH cannot be excluded. The primary differential includes leptomeningeal metastasis (questionable given acute timeline), or infectious process.           -- continue Keppra 500mg BID for seizure ppx  -- LP completed 2/10, CSF results pending  R/o infection and assess cytology

## 2025-02-10 NOTE — SUBJECTIVE & OBJECTIVE
Interval History:  No acute events overnight.  We will hold Lovenox and go for lumbar puncture today with IM6.     Oncology Treatment Plan:   OP DURVALUMAB 1500 MG Q4W    Medications:  Continuous Infusions:  Scheduled Meds:   amitriptyline  25 mg Oral QHS    aspirin  81 mg Oral Daily    atorvastatin  80 mg Oral Daily    dexAMETHasone  4 mg Oral BID    fluticasone furoate-vilanteroL  1 puff Inhalation Daily    fluticasone propionate  2 spray Each Nostril Daily    furosemide  20 mg Oral Daily    latanoprost  1 drop Both Eyes QHS    levETIRAcetam  500 mg Oral BID    losartan  100 mg Oral Daily    memantine  5 mg Oral BID    pantoprazole  40 mg Oral Daily    roflumilast  1 tablet Oral Daily    [START ON 2/11/2025] tiotropium bromide  2 puff Inhalation Daily    traZODone  50 mg Oral QHS     PRN Meds:  Current Facility-Administered Medications:     acetaminophen, 650 mg, Oral, Q6H PRN    albuterol, 2 puff, Inhalation, Q4H PRN    dextrose 50%, 12.5 g, Intravenous, PRN    dextrose 50%, 25 g, Intravenous, PRN    glucagon (human recombinant), 1 mg, Intramuscular, PRN    glucose, 16 g, Oral, PRN    glucose, 24 g, Oral, PRN    ibuprofen, 600 mg, Oral, Q6H PRN    iohexoL, 100 mL, Intravenous, ONCE PRN    naloxone, 0.02 mg, Intravenous, PRN    nitroGLYCERIN, 0.4 mg, Sublingual, Q5 Min PRN    sodium chloride 0.9%, 10 mL, Intravenous, Q12H PRN     Review of Systems  Objective:     Vital Signs (Most Recent):  Temp: 97.9 °F (36.6 °C) (02/10/25 0746)  Pulse: 87 (02/10/25 1220)  Resp: 18 (02/10/25 1039)  BP: (!) 143/83 (02/10/25 0746)  SpO2: 96 % (02/10/25 1039) Vital Signs (24h Range):  Temp:  [97.8 °F (36.6 °C)-98.4 °F (36.9 °C)] 97.9 °F (36.6 °C)  Pulse:  [73-95] 87  Resp:  [15-18] 18  SpO2:  [95 %-99 %] 96 %  BP: (130-143)/(67-89) 143/83     Weight: 84.5 kg (186 lb 4.6 oz)  Body mass index is 27.51 kg/m².  Body surface area is 2.03 meters squared.      Intake/Output Summary (Last 24 hours) at 2/10/2025 1237  Last data filed at  2/10/2025 1105  Gross per 24 hour   Intake 360 ml   Output 1450 ml   Net -1090 ml        Physical Exam  Vitals reviewed.   Constitutional:       Appearance: Normal appearance.   HENT:      Head: Normocephalic and atraumatic.      Mouth/Throat:      Pharynx: Oropharynx is clear.   Eyes:      General: No scleral icterus.     Conjunctiva/sclera: Conjunctivae normal.   Cardiovascular:      Rate and Rhythm: Normal rate and regular rhythm.      Pulses: Normal pulses.      Heart sounds: Normal heart sounds.   Pulmonary:      Effort: Pulmonary effort is normal. No respiratory distress.   Abdominal:      General: Abdomen is flat.      Tenderness: There is no abdominal tenderness. There is no guarding or rebound.   Musculoskeletal:      Right lower leg: No edema.      Left lower leg: No edema.   Skin:     Coloration: Skin is not jaundiced.   Neurological:      Mental Status: He is alert and oriented to person, place, and time.   Psychiatric:         Mood and Affect: Mood normal.         Behavior: Behavior normal.          Significant Labs:   All pertinent labs from the last 24 hours have been reviewed.    Diagnostic Results:  I have reviewed and interpreted all pertinent imaging results/findings within the past 24 hours.

## 2025-02-10 NOTE — ANESTHESIA PROCEDURE NOTES
Lumbar puncture    Diagnosis: Vision, loss, sudden, unspecified laterality  Patient location during procedure: floor  Start time: 2/10/2025 1:16 PM  Timeout: 2/10/2025 1:14 PM  End time: 2/10/2025 1:19 PM    Staffing  Authorizing Provider: Jonas Turcios DO  Performing Provider: Jonas Turcios DO    Staffing  Performed by: Jonas Turcios DO  Authorized by: Jonas Turcios DO    Preanesthetic Checklist  Completed: patient identified, IV checked, site marked, risks and benefits discussed, surgical consent, monitors and equipment checked, pre-op evaluation and timeout performed  Spinal Block  Patient position: sitting  Prep: ChloraPrep  Approach: midline  Location: L3-4  Injection technique: lumbar puncture  CSF Fluid: clear free-flowing CSF  Needle  Needle type: Quincke   Needle gauge: 25 G  Needle length: 4 in  Needle localization: anatomical landmarks  Assessment  Patient's tolerance of the procedure: no complaints

## 2025-02-10 NOTE — ASSESSMENT & PLAN NOTE
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 Patient returns with reports of vision disturbance, headache, and walking difficulty. Per ED, patient reports not being able to see left visual field out of both eyes, confusion, headaches, and needing to use cane to get around. HDS. Patient reports overall vision blurriness. On examination, noted issues with tracking and convergence, no overly convincing visual field defects noted. Initial and repeat CTA stroke no evidence of acute hemorrhage or vascular distribution. Repeat MRI Brain with new T2 FLAIR signal abnormality in the subarachnoid space overlying the posterior right temporal and right occipital lobes. Ddx noninfectious vs noninfectious vs subarachnoid hemorrhage vs Leptomeningeal metastatic. Overall impression likely related related to know metastatic lesion vs side effects of GK versus meningitis, will evaluate with LP    Plan:   - Neurology consulted, recommending empiric treatment for meningitis, we will start after LP.  - Ophthalmology consulted, recommend outpatient follow-up, continuing home lantoprost drops  - Neuro checks q 4 hours  - Continue dexamethasone  - Continue memantine   -F/u LP

## 2025-02-10 NOTE — PROGRESS NOTES
"[facility] - Oncology ([facility])  Neurology  Progress Note    Patient Name: Jordin Allen Jr.  MRN: [id number]  Admission Date: 2/7/2025  Hospital Length of Stay: 3 days  Code Status: Full Code   Attending Provider: Sharla Rodríguez MD  Primary Care Physician: Sierra Landry MD   Principal Problem:Unspecified visual disturbance    HPI:   Neurology is consulted for unspecified visual disturbance in a 79 y.o. 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 The patient was discharged earlier today after admission for TIA concerns and returned with vision disturbance, headache, and difficulty walking.    Per ED, the patient described sudden inability to see his left visual field in both eyes, overall blurriness, and an episode of confusion while driving home from the hospital earlier today, stating he "did not know what street he was on," which is atypical for him. Additionally, his daughter reports progressive slowing of movement over the last few days, increased cane use, and behavioral changes including irritability. No reported extremity weakness, sensory deficits, falls, or speech changes.  He was found face down the night prior to admission with a temporary right gaze deviation and worsening left-sided weakness, prompting inpatient evaluation. MRI was negative for acute CVA, and he was discharged on Decadron with outpatient EEG planned. On examination, he exhibited difficulty with tracking and convergence, though no definitive visual field deficits were appreciated.    CTA stroke was negative for hemorrhage or large vessel occlusion. However, repeat MRI revealed a new T2 FLAIR hyperintensity in the subarachnoid space overlying the posterior right temporal and right occipital lobes, adjacent to known metastatic lesions.    Subjective:     Interval History:   Pt reports he is feeling better today, less visual disturbance  On dex 4 " mg BID (day 4)   LP completed, CSF results pending  Started on Keppra 500 mg BID for seizure ppx  Primary team discussed imaging findings with Rad Onc, feel less concern for disease progression and gamma knife related changes     Current Neurological Medications:   Keppra 500 mg BID    Current Facility-Administered Medications   Medication Dose Route Frequency Provider Last Rate Last Admin    acetaminophen tablet 650 mg  650 mg Oral Q6H PRN Zahida Garcia MD   650 mg at 02/08/25 1124    albuterol inhaler 2 puff  2 puff Inhalation Q4H PRN Zahida Garcia MD        amitriptyline tablet 25 mg  25 mg Oral QHS Zahida Garcia MD   25 mg at 02/09/25 2048    aspirin EC tablet 81 mg  81 mg Oral Daily Zahida Garcia MD   81 mg at 02/10/25 0852    atorvastatin tablet 80 mg  80 mg Oral Daily Zahida Garcia MD   80 mg at 02/10/25 0851    dexAMETHasone tablet 4 mg  4 mg Oral BID Zahida Garcia MD   4 mg at 02/10/25 0852    dextrose 50% injection 12.5 g  12.5 g Intravenous PRN Zahida Garcia MD        dextrose 50% injection 25 g  25 g Intravenous PRN Zahida Garcia MD        fluticasone furoate-vilanteroL 100-25 mcg/dose diskus inhaler 1 puff  1 puff Inhalation Daily Deuce Patiño MD   1 puff at 02/10/25 1039    fluticasone propionate 50 mcg/actuation nasal spray 100 mcg  2 spray Each Nostril Daily Zahida Garcia MD   100 mcg at 02/08/25 1123    furosemide tablet 20 mg  20 mg Oral Daily Zahida Garcia MD   20 mg at 02/10/25 0852    glucagon (human recombinant) injection 1 mg  1 mg Intramuscular PRN Zahida Garcia MD        glucose chewable tablet 16 g  16 g Oral PRN Zahida Garcia MD        glucose chewable tablet 24 g  24 g Oral PRN Zahida Garcia MD        ibuprofen tablet 600 mg  600 mg Oral Q6H PRN Samad, Mawadah, MD        iohexoL (OMNIPAQUE 350) injection 100 mL  100 mL Intravenous ONCE PRN Loraine Mo MD        latanoprost 0.005 %  ophthalmic solution 1 drop  1 drop Both Eyes QHS Deuce Patiño MD        levETIRAcetam tablet 500 mg  500 mg Oral BID Deuce Patiño MD   500 mg at 02/10/25 1046    losartan tablet 100 mg  100 mg Oral Daily Zahida Garcia MD   100 mg at 02/10/25 0852    memantine tablet 5 mg  5 mg Oral BID Samad, Mawadah, MD   5 mg at 02/10/25 0852    naloxone 0.4 mg/mL injection 0.02 mg  0.02 mg Intravenous PRN Zahida Garcia MD        nitroGLYCERIN SL tablet 0.4 mg  0.4 mg Sublingual Q5 Min PRN Zahida Garcia MD        pantoprazole EC tablet 40 mg  40 mg Oral Daily Zahida Garcia MD   40 mg at 02/10/25 0852    roflumilast (DALIRESP) tablet 500 mcg  1 tablet Oral Daily Zahida Garcia MD   500 mcg at 02/10/25 0852    sodium chloride 0.9% flush 10 mL  10 mL Intravenous Q12H PRN Zahida Garcia MD        [START ON 2/11/2025] tiotropium bromide 2.5 mcg/actuation inhaler 2 puff  2 puff Inhalation Daily Deuce Patiño MD        traZODone tablet 50 mg  50 mg Oral QHS Zahida Garcia MD   50 mg at 02/09/25 2048     Facility-Administered Medications Ordered in Other Encounters   Medication Dose Route Frequency Provider Last Rate Last Admin    dextrose 50% injection 12.5 g  12.5 g Intravenous PRN Lidia Deleon MD        dextrose 50% injection 25 g  25 g Intravenous PRN Lidia Deleon MD        insulin regular injection 0-8 Units  0-8 Units Intravenous Continuous PRN Lidia Deleon MD         Review of Systems   Constitutional:  Positive for activity change and fatigue. Negative for fever.   HENT:  Negative for trouble swallowing and voice change.    Eyes:  Positive for visual disturbance. Negative for photophobia, pain and redness.   Respiratory:  Negative for cough and shortness of breath.    Cardiovascular:  Negative for chest pain and leg swelling.   Gastrointestinal:  Negative for nausea and vomiting.   Musculoskeletal:  Negative for gait problem and neck stiffness.    Neurological:  Negative for dizziness, tremors, speech difficulty, weakness and headaches.   Psychiatric/Behavioral:  Negative for confusion.      Objective:     Vital Signs (Most Recent):  Temp: 97.9 °F (36.6 °C) (02/10/25 0746)  Pulse: 81 (02/10/25 1515)  Resp: 18 (02/10/25 1039)  BP: (!) 143/83 (02/10/25 0746)  SpO2: 97 % (02/10/25 1300) Vital Signs (24h Range):  Temp:  [97.8 °F (36.6 °C)-98.4 °F (36.9 °C)] 97.9 °F (36.6 °C)  Pulse:  [73-92] 81  Resp:  [15-18] 18  SpO2:  [95 %-99 %] 97 %  BP: (130-143)/(67-83) 143/83     Weight: 84.5 kg (186 lb 4.6 oz)  Body mass index is 27.51 kg/m².     Physical Exam  Eyes:      Extraocular Movements: EOM normal.      Pupils: Pupils are equal, round, and reactive to light.   Neurological:      Mental Status: He is oriented to person, place, and time.      Deep Tendon Reflexes:      Reflex Scores:       Bicep reflexes are 2+ on the right side and 2+ on the left side.       Brachioradialis reflexes are 2+ on the right side.       Patellar reflexes are 2+ on the right side and 2+ on the left side.  Psychiatric:         Speech: Speech normal.          NEUROLOGICAL EXAMINATION:     MENTAL STATUS   Oriented to person, place, and time.   Follows 2 step commands.   Attention: normal. Concentration: normal.   Speech: speech is normal   Level of consciousness: alert  Knowledge: good.   Normal comprehension.     CRANIAL NERVES     CN II   Left visual field deficit: upper temporal and upper nasal quadrant(s)    CN III, IV, VI   Pupils are equal, round, and reactive to light.  Extraocular motions are normal.     MOTOR EXAM   Muscle bulk: normal  Overall muscle tone: normal  Right arm pronator drift: absent  Left arm pronator drift: absent    Strength   Right deltoid: 5/5  Left deltoid: 5/5  Right biceps: 5/5  Left biceps: 5/5  Right triceps: 5/5  Left triceps: 5/5  Right interossei: 5/5  Left interossei: 5/5  Right iliopsoas: 5/5  Left iliopsoas: 5/5  Right quadriceps: 5/5  Left  quadriceps: 5/5  Right anterior tibial: 5/5  Left anterior tibial: 5/5  Right posterior tibial: 5/5  Left posterior tibial: 5/5  Right peroneal: 5/5  Left peroneal: 5/5    REFLEXES     Reflexes   Right brachioradialis: 2+  Right biceps: 2+  Left biceps: 2+  Right patellar: 2+  Left patellar: 2+    SENSORY EXAM   Light touch normal.     GAIT AND COORDINATION     Tremor   Resting tremor: absent    Significant Labs: All pertinent lab results from the past 24 hours have been reviewed.    CSF (2/10/25) pending    Significant Imaging: I have reviewed all pertinent imaging results/findings within the past 24 hours.    MRI Brain WO contrast (2/8/25):  Compared to recent MRI of the brain performed 02/07/2025, new T2 FLAIR signal abnormality in the subarachnoid space overlying the posterior right temporal and right occipital lobes.  Infectious or noninfectious inflammatory meningitis difficult centered.  Small volume subarachnoid hemorrhage would additionally be considered, noting that there is no significant susceptibility-related signal loss in this region.  Leptomeningeal metastatic disease would be considered somewhat less likely given relatively rapid change in appearance since the recent MRI.  No acute infarct.Stable edema around node metastatic lesion within the right temporal occipital junction.  Remaining lesions are less visible in the absence of intravenous contrast.    Assessment and Plan:     * Unspecified visual disturbance  79 y.o. M with known brain mets from lung adenocarcinoma, s/p multiple GK treatments, presented 2/7/25 after recent discharge for TIA concerns with new onset L sided visual deficits, confusion, and gait instability. He initially presented with right gaze deviation and left-sided weakness, though MRI was negative for acute CVA. He was discharged on Decadron with outpatient EEG planned. On return, he reported sudden left-sided visual loss in both eyes and an episode of confusion while driving.   Repeat MRI showed new T2 FLAIR hyperintensity in the posterior right temporal and occipital subarachnoid space, adjacent to a known metastatic lesion. Given the rapid radiographic change, considerations include noninfectious inflammatory changes, subarachnoid hemorrhage, or leptomeningeal metastasis. Symptoms localize to the right occipital and possibly temporal lobes, corresponding with his new MRI findings. The etiology is likely related to his known metastatic disease vs infection given rapid onset. Radiation necrosis or treatment-related edema are possible, but the presence of subarachnoid FLAIR changes also raises concern for infectious, inflammatory, or leptomeningeal metastasis. Serial imaging shows worsening right occipital and posterior temporal T2 FLAIR hyperintensity without restricted diffusion on DWI, arguing against acute infarction. SWI does not reveal hemorrhagic signal, making a large SAH less likely, though subtle SAH cannot be excluded. The primary differential includes leptomeningeal metastasis (questionable given acute timeline), or infectious process.           -- continue Keppra 500mg BID for seizure ppx  -- empiric treatment for meningitis/encephalitis deferred given clinical improvement  --LP completed 2/10, CSF results pending  R/o infection and assess cytology  --imaging reviewed by Rad Onc who feels less of a concern for disease progression and gamma knife related changes   --PT/OT    VTE Risk Mitigation (From admission, onward)           Ordered     Place sequential compression device  Until discontinued         02/07/25 1923                  Michelle Navarro PA-C  General Neurology Consult

## 2025-02-10 NOTE — PT/OT/SLP PROGRESS
Occupational Therapy      Patient Name:  Jordin Allen .   MRN:  [id number]    Patient not seen today secondary to pt eating lunch during 1st attempt and on hold due to lumbar puncture during 2nd attempt.  Will follow-up as scheduled per OT POC.    2/10/2025

## 2025-02-10 NOTE — PLAN OF CARE
Plan of care reviewed with pt. Pt AAOx4 but intermittently confused. Pt with hearing aids . Patient one person assist . LP done , sample sent to the lab .  Tele sitter in room, bed alarm set.  All VSS, afebrile, free from falls or injuries; no acute events so far this shift. Pt in bed with non-skid socks on, bed locked and in lowest position, side rails up x2, call light and personal items within reach. Pt instructed to call for assistance as needed.

## 2025-02-10 NOTE — CARE UPDATE
I have reviewed the chart of Jordin Allen Jr. who is hospitalized for the following:    Active Hospital Problems    Diagnosis    *Unspecified visual disturbance    Hypercoagulopathy     Adenocarcinoma, lung, left with brain mets  AC therapy         Immunocompromised state     Metastasis to brain     Adenocarcinoma, lung, left   COPD      TIA (transient ischemic attack)    Metastasis to brain    Adenocarcinoma, lung, left    Aortic atherosclerosis     Taking atorvastatin      Chronic obstructive pulmonary disease     Taking symbicort and spiriva and daliresp  Peak flow 270 l/min In April his Fev-1: 1.33 liters 47%.       History of smoking 30 or more pack years    CAD (coronary artery disease)     -[facility] (10/31/13) for stemi: pLAD 100%, Cx with Li's, mRCA 40%, LVEF 55%,. LVEDP 15   -Xience 3.0 x 33 mm to pLAD, post dilated with 3.5 NC and 4.0 NC.   -[facility]E (8/14/13): LVEF 55%, grade I diastolic dysfunction      HTN (hypertension), benign    SHOLA (obstructive sleep apnea)     CPAP at night      GERD (gastroesophageal reflux disease)    Centrilobular emphysema        TAVON DELGADO  Unit Based JARAD

## 2025-02-10 NOTE — CONSULTS
"[facility] ([facility])  Neurology  Consult Note    Patient Name: Jordin Allen Jr.  MRN: [id number]  Admission Date: 2/7/2025  Hospital Length of Stay: 2 days  Code Status: Full Code   Attending Provider: Sharla Rodríguez MD   Consulting Provider: Shaun Reid MD  Primary Care Physician: Sierra Landry MD  Principal Problem:Vision, loss, sudden, unspecified laterality    Inpatient Consult to Neurology Services (General Neurology)  Consult performed by: Shaun Reid MD  Consult ordered by: Samad, Mawadah, MD         Subjective:     Chief Complaint:  Vision loss     HPI:   Neurology is consulted for unspecified visual disturbance in a 79 y.o. 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 The patient was discharged earlier today after admission for TIA concerns and returned with vision disturbance, headache, and difficulty walking.    Per ED, the patient described sudden inability to see his left visual field in both eyes, overall blurriness, and an episode of confusion while driving home from the hospital earlier today, stating he "did not know what street he was on," which is atypical for him. Additionally, his daughter reports progressive slowing of movement over the last few days, increased cane use, and behavioral changes including irritability. No reported extremity weakness, sensory deficits, falls, or speech changes.  He was found face down the night prior to admission with a temporary right gaze deviation and worsening left-sided weakness, prompting inpatient evaluation. MRI was negative for acute CVA, and he was discharged on Decadron with outpatient EEG planned. On examination, he exhibited difficulty with tracking and convergence, though no definitive visual field deficits were appreciated.    CTA stroke was negative for hemorrhage or large vessel occlusion. However, repeat MRI revealed a new T2 FLAIR " hyperintensity in the subarachnoid space overlying the posterior right temporal and right occipital lobes, adjacent to known metastatic lesions.     Past Medical History:   Diagnosis Date    Benign neoplasm of colon 10/01/2013    Bronchitis chronic     CAD (coronary artery disease) 10/31/2013    COPD (chronic obstructive pulmonary disease)     Emphysema of lung     Encounter for preventive health examination 3/21/2018    GERD (gastroesophageal reflux disease)     Hx of colonic polyps     Hypertension     NAFLD (nonalcoholic fatty liver disease) 03/27/2017    SHOLA on CPAP     RLS (restless legs syndrome)     Screen for colon cancer 08/30/2013       Past Surgical History:   Procedure Laterality Date    bilateral foot surgery  1993    CARDIAC CATHETERIZATION  2013    x1    CATARACT EXTRACTION, BILATERAL      COLON SURGERY  2013    Ace Mott MD    COLONOSCOPY N/A 3/21/2018    Procedure: COLONOSCOPY;  Surgeon: Terry Mott MD;  [facility]  Service: Endoscopy;  Laterality: N/A;    COLONOSCOPY N/A 4/12/2019    Procedure: COLONOSCOPY;  Surgeon: John Mantilla MD;  [facility]  Service: Endoscopy;  Laterality: N/A;    COLONOSCOPY N/A 5/31/2022    Procedure: COLONOSCOPY;  Surgeon: MEDINA Farris MD;  Location[facility]  Service: Endoscopy;  Laterality: N/A;  fully vacc-inst portal-tb    ENDOBRONCHIAL ULTRASOUND Bilateral 4/30/2024    Procedure: ENDOBRONCHIAL ULTRASOUND (EBUS);  Surgeon: Naveed Aguero MD;  Location: [facility] OR;  Service: Pulmonary;  Laterality: Bilateral;    scrotal abscess removal         Review of patient's allergies indicates:   Allergen Reactions    Brilinta [ticagrelor] Itching    Lisinopril      Other reaction(s): cough    Metoprolol succinate Rash       Current Facility-Administered Medications on File Prior to Encounter   Medication    dextrose 50% injection 12.5 g    dextrose 50% injection 25 g    insulin regular injection 0-8 Units      Current Outpatient Medications on File Prior to Encounter   Medication Sig    albuterol (ACCUNEB) 0.63 mg/3 mL Nebu Inhale 3 mLs (0.63 mg total) by nebulization every 6 (six) hours as needed (if symptoms failed to improve with inhaler). Rescue    albuterol (PROVENTIL/VENTOLIN HFA) 90 mcg/actuation inhaler Inhale 2 puffs into the lungs every 4 (four) hours as needed for Wheezing.    amitriptyline (ELAVIL) 25 MG tablet Take 1 tablet (25 mg total) by mouth once daily.    aspirin (ECOTRIN) 81 MG EC tablet Take 81 mg by mouth once daily.     atorvastatin (LIPITOR) 80 MG tablet TAKE 1 TABLET (80 MG TOTAL) BY MOUTH ONCE DAILY.    BETA-CAROTENE,A, W-C & E/MIN (OCUVITE ORAL) Take 1 capsule by mouth once daily.     budesonide-glycopyr-formoterol (BREZTRI AEROSPHERE) 160-9-4.8 mcg/actuation HFAA Inhale 2 puffs into the lungs 2 (two) times a day.    dexAMETHasone (DECADRON) 4 MG Tab Take 1 tablet (4 mg total) by mouth 2 (two) times daily.    echinacea 400 mg Cap Take 1 capsule by mouth once daily.     fluticasone propionate (FLONASE) 50 mcg/actuation nasal spray Spray 2 sprays (100 mcg total) in Each Nostril once daily.    furosemide (LASIX) 20 MG tablet Take 1 tablet (20 mg total) by mouth once daily.    latanoprost 0.005 % ophthalmic solution Place 1 drop into both eyes once daily.     latanoprost 0.005 % ophthalmic solution Instill 1 drop into both eyes every night at bedtime    losartan (COZAAR) 100 MG tablet Take 1 tablet (100 mg total) by mouth once daily.    nitroGLYCERIN (NITROSTAT) 0.4 MG SL tablet Place 1 tablet (0.4 mg total) under the tongue every 5 (five) minutes as needed.    omeprazole (PRILOSEC) 20 MG capsule Take 1 capsule (20 mg total) by mouth once daily.    roflumilast (DALIRESP) 500 mcg Tab Take 1 tablet (500 mcg total) by mouth once daily.    senna (SENOKOT) 8.6 mg tablet Take 1 tablet by mouth once daily.    traZODone (DESYREL) 50 MG tablet Take 1 tablet (50 mg total) by mouth every evening.      Family History       Problem Relation (Age of Onset)    Heart attack Mother    Heart disease Mother    Hypertension Mother    No Known Problems Sister, Daughter          Tobacco Use    Smoking status: Former     Current packs/day: 0.00     Average packs/day: 1 pack/day for 40.0 years (40.0 ttl pk-yrs)     Types: Cigarettes     Start date: 8/15/1972     Quit date: 8/15/2012     Years since quittin.4     Passive exposure: Never    Smokeless tobacco: Never   Substance and Sexual Activity    Alcohol use: Yes     Alcohol/week: 3.0 standard drinks of alcohol     Types: 3 Cans of beer per week     Comment: maybe 2-3  beers weekly    Drug use: No    Sexual activity: Yes     Partners: Female     Review of Systems   Constitutional:  Negative for activity change.   HENT:  Negative for hearing loss and sore throat.    Eyes:  Positive for visual disturbance.   Respiratory:  Negative for cough and shortness of breath.    Cardiovascular:  Negative for chest pain.   Gastrointestinal:  Negative for abdominal pain.   Genitourinary:  Negative for dysuria.   Musculoskeletal:  Negative for arthralgias.   Skin:  Negative for color change.   Neurological:  Negative for headaches.   Psychiatric/Behavioral:  Negative for confusion.      Objective:     Vital Signs (Most Recent):  Temp: 98 °F (36.7 °C) (25 1535)  Pulse: 95 (25 1535)  Resp: 18 (25 1535)  BP: 130/89 (25 1535)  SpO2: 97 % (25 1535) Vital Signs (24h Range):  Temp:  [96.5 °F (35.8 °C)-98.5 °F (36.9 °C)] 98 °F (36.7 °C)  Pulse:  [72-96] 95  Resp:  [17-20] 18  SpO2:  [94 %-97 %] 97 %  BP: (117-145)/(72-93) 130/89     Weight: 84.5 kg (186 lb 4.6 oz)  Body mass index is 27.51 kg/m².     Physical Exam  Vitals reviewed.   Constitutional:       Appearance: Normal appearance.   HENT:      Head: Normocephalic and atraumatic.      Mouth/Throat:      Pharynx: Oropharynx is clear.   Eyes:      General: No scleral icterus.  Cardiovascular:      Rate and  Rhythm: Normal rate.      Pulses: Normal pulses.   Pulmonary:      Effort: Pulmonary effort is normal. No respiratory distress.   Abdominal:      General: Abdomen is flat.   Skin:     Coloration: Skin is not jaundiced.   Neurological:      Mental Status: He is alert.       LOC: alert  Attention Span: Good   Language: No aphasia  Articulation: No dysarthria  Orientation: Person, Place, Time   Visual Fields: Superior quadrantanopsia: left  EOM (CN III, IV, VI): Full/intact  Pupils (CN II, III): PERRL  Facial Sensation (CN V): Normal  Facial Movement (CN VII): Symmetric facial expression    Reflexes: 2+ throughout  Motor: Arm left  Normal 5/5  Leg left  Normal 5/5  Arm right  Normal 5/5  Leg right Normal 5/5  Cerebellum: No evidence of appendicular or axial ataxia  Sensation: Intact to light touch  Tone: Normal tone throughout              Significant Labs: All pertinent lab results from the past 24 hours have been reviewed.    Significant Imaging: I have reviewed all pertinent imaging results/findings within the past 24 hours.  Assessment and Plan:     * Vision, loss, sudden, unspecified laterality  79 y.o. M with known brain mets from lung adenocarcinoma, s/p multiple GK treatments, presented after recent discharge for TIA concerns with new onset left homonymous hemianopia, confusion, and gait instability. He initially presented with right gaze deviation and left-sided weakness, though MRI was negative for acute CVA. He was discharged on Decadron with outpatient EEG planned. On return, he reported sudden left-sided visual loss in both eyes and an episode of confusion while driving. Examination demonstrated impaired tracking and convergence, but no overt hemianopia was confirmed. Repeat MRI showed new T2 FLAIR hyperintensity in the posterior right temporal and occipital subarachnoid space, adjacent to a known metastatic lesion. Given the rapid radiographic change, considerations include noninfectious inflammatory  changes, subarachnoid hemorrhage, or leptomeningeal metastasis.     This patients symptoms localize to the right occipital and possibly temporal lobes, corresponding with his new MRI findings. The etiology is likely related to his known metastatic disease vs infection given rapid onset. Radiation necrosis or treatment-related edema are possible, but the presence of subarachnoid FLAIR changes also raises concern for infectious, inflammatory, or leptomeningeal metastasis.     Serial imaging over the past 24 hours shows worsening right occipital and posterior temporal T2 FLAIR hyperintensity without restricted diffusion on DWI, arguing against acute infarction. SWI does not reveal hemorrhagic signal, making a large SAH less likely, though subtle SAH cannot be excluded. The primary differential includes leptomeningeal metastasis (questionable given acute timeline), or infectious process.           -- Recommend Keppra 500mg BID; given increased risk of seizures  -- Recommend empiric therapy for possible infection  -- If it changes management, consider LP to assess for infections as well as malignant cells  -- Neuro checks q4h for worsening symptoms, including mental status and vision.  -- Neurology will continue to follow, please call with questions or concerns        VTE Risk Mitigation (From admission, onward)           Ordered     enoxaparin injection 40 mg  Every 24 hours         02/07/25 1923     Place sequential compression device  Until discontinued         02/07/25 1923                    Thank you for your consult. I will follow-up with patient. Please contact us if you have any additional questions.    Shaun Reid MD  Neurology  [facility] - Oncology ([facility])

## 2025-02-10 NOTE — PLAN OF CARE
Plan of care discussed with patient at start of shift. Free from falls and injuries. Bed alarm in use. Resting quietly with eyes closed. Respirations even, unlabored. Skin warm and dry. Denies pain. Denies nausea. Frequent checks for pain and safety maintained. Bed in lowest position, wheels locked, side rails up x's 2, call light in reach. Instructed to call for assistance as needed, verbalizes understanding.

## 2025-02-10 NOTE — CONSULTS
[facility]   Infectious Diseases  Progress Note     Patient Name: Jordin Allen Jr.   MRN:  [id number]   Admission Date:  2/7/2025  Length of Stay:  3  Attending Physician:  Sharla Rodríguez MD   Primary Care Provider:  Sierra Landry MD       Isolation Status: No active isolations    Assessment/Plan:      Jordin Allen is a 79 year old male with history of CAD, GERD, HTN, RKS, CO)PD Lung adenocarcinoma (3/19/2024) with mets to the brain  s/p GK x3, previously on durvalumab but stopped due to recurrent pneumonitis. Seen 2/7 with concern for left sided weakness after a fall with neuro work up relatively unremarkable and was discharged. Patient returned to the ED 2/7 with sudden vision loss (left visual field bilaterally) with no other findings outside of reported confusion during ride home from earlier discharge. Repeat CTA Negative. Repeat MRI ordered (2/7) and new T2 FLAIR abnormality in the subarachnoid space overlying the posterior right temporal and right occipital lobes, small volume SDH, adjacent to known metastatic lesions.  Patient initiated on Ampicillin, Rocephin, Dexamethasone.  ID consulted due to concern for meningitis.               Anticipated Disposition: TDD      Thank you for your consult. I will follow-up with patient. Please contact us if you have any additional questions.     Brendon Florentino MD  Infectious Diseases Fellow      Subjective:      Principal Problem: Unspecified visual disturbance       HPI:   Jordin Allen is a 79 year old male with history of CAD, GERD, HTN, RKS, CO)PD Lung adenocarcinoma (3/19/2024) with mets to the brain  s/p GK x3, previously on durvalumab but stopped due to recurrent pneumonitis. Seen 2/7 with concern for left sided weakness after a fall with neuro work up relatively unremarkable and was discharged. Patient returned to the ED 2/7 with sudden vision loss (left visual field bilaterally) with no other findings  outside of reported confusion during ride home from earlier discharge. Repeat CTA Negative. Repeat MRI ordered () and new T2 FLAIR abnormality in the subarachnoid space overlying the posterior right temporal and right occipital lobes, small volume SDH, adjacent to known metastatic lesions.  Patient initiated on Ampicillin, Rocephin, Dexamethasone.  ID consulted due to concern for meningitis.        Review of Systems   ROS    Objective:      Last 24 Hour Vital Signs:  BP  Min: 130/67  Max: 143/83  Temp  Av °F (36.7 °C)  Min: 97.5 °F (36.4 °C)  Max: 98.4 °F (36.9 °C)  Pulse  Av.5  Min: 73  Max: 95  Resp  Av.1  Min: 15  Max: 18  SpO2  Av %  Min: 95 %  Max: 99 %  I/O last 3 completed shifts:  In: 600 [P.O.:600]  Out: 1500 [Urine:1500]    Body mass index is 27.51 kg/m².  Estimated Creatinine Clearance: 56.2 mL/min (based on SCr of 1.1 mg/dL).        Physical Exam  Physical Exam         Labs  Recent Labs   Lab 25  0434 25  0216 02/10/25  0458   WBC 5.15 8.54 8.26   HGB 12.3* 11.3* 12.1*   HCT 37.7* 34.8* 36.2*    200 193   MCV 85 86 86   RDW 13.7 14.0 14.2    141 140   K 4.0 3.7 3.9    105 105   CO2 24 26 26   BUN 17 26* 30*   CREATININE 0.9 1.1 1.1   * 127* 128*   PROT 7.2 6.1 6.1   ALBUMIN 3.6 3.2* 3.1*   BILITOT 0.8 0.5 0.5   AST 14 12 11   ALKPHOS 93 83 76   ALT 13 12 14         Microbiology:  Microbiology Results (last 7 days)       ** No results found for the last 168 hours. **            Significant Imaging: I have reviewed all pertinent imaging results/findings within the past 24 hours.  Imaging Results               MRI Brain Without Contrast (Final result)  Result time 25 06:26:06      Final result by Bolivar Gonsales MD (25 06:26:06)                   Impression:      Compared to recent MRI of the brain performed 2025, new T2 FLAIR signal abnormality in the subarachnoid space overlying the posterior right temporal and right  occipital lobes.  Infectious or noninfectious inflammatory meningitis difficult centered.  Small volume subarachnoid hemorrhage would additionally be considered, noting that there is no significant susceptibility-related signal loss in this region.  CSF sampling and/or a short-term follow-up noncontrast head CT may be of benefit in this regard.  Leptomeningeal metastatic disease would be considered somewhat less likely given relatively rapid change in appearance since the recent MRI.  No acute infarct.    Stable edema around node metastatic lesion within the right temporal occipital junction.  Remaining lesions are less visible in the absence of intravenous contrast.    This report was flagged in Epic as abnormal.    Electronically signed by resident: Delvin Valdez  Date:    02/08/2025  Time:    05:58    Electronically signed by: Bolivar Gonsales  Date:    02/08/2025  Time:    06:26               Narrative:    EXAMINATION:  MRI BRAIN WITHOUT CONTRAST    CLINICAL HISTORY:  Dizziness, persistent/recurrent, cardiac or vascular cause suspected;.    TECHNIQUE:  Multiplanar multisequence MR imaging of the brain was performed without contrast.    COMPARISON:  MRI brain 02/07/2025, CTA stroke 02/07/2025    FINDINGS:  Intracranial compartment:    Ventricles and sulci are normal in size for age without evidence of hydrocephalus. No extra-axial blood or fluid collections.    No acute infarct.  New FLAIR signal abnormality within the subarachnoid space overlying the posterior right temporal and right occipital lobes adjacent to known metastatic lesion. Stable vasogenic edema within the right temporal occipital junction in the area of known metastatic lesion when compared to prior MRI performed 02/07/2025.    Additional known metastatic lesions are not definitively visualized on this noncontrast examination.  Patchy areas of T2 FLAIR signal abnormality in the supratentorial white matter, similar to prior likely representing  chronic microvascular ischemic disease.    Normal vascular flow voids are preserved.    Skull/extracranial contents (limited evaluation): Bone marrow signal intensity is normal.                                       CTA STROKE MULTI-PHASE (Final result)  Result time 02/07/25 17:08:38      Final result by Rush Lerma MD (02/07/25 17:08:38)                   Impression:      No evidence of acute hemorrhage or major vascular distribution infarct.    Patient with known small metastatic lesions, better delineated on the recent MRI.  No new worsening edema or intracranial mass effect.    CT arteriogram appears unchanged from recent study, again exhibiting modest scattered atherosclerotic calcification.  No evidence of a new high-grade stenosis or intracranial large vessel occlusion.      Electronically signed by: Rush Lerma MD  Date:    02/07/2025  Time:    17:08               Narrative:    EXAMINATION:  CTA STROKE MULTI-PHASE    CLINICAL HISTORY:  Neuro deficit, acute, stroke suspected;    TECHNIQUE:  Axial CT images obtained throughout the region of the head before the administration of intravenous contrast.    CT angiogram was performed through the cervical and intracranial vasculature during the IV bolus administration of 100mL of Omnipaque 350.  Two additional phases through the intracranial vasculature via multiphase technique.    Multiplanar MPR and MIP reformats were performed.    CT source data was analyzed using artificial intelligence software for detection of a large vessel occlusions (LVO) in order to enable computer assisted triage notification and aid clinical stroke decision making.    COMPARISON:  MRI head CT performed earlier on 02/07/2025    FINDINGS:  The ventricles are normal in size without evidence of hydrocephalus.    Brain appears unchanged from the recent MRI allowing for variation in modality.  No parenchymal changes to indicate an acute major vascular distribution infarct.  No acute  parenchymal hemorrhage.  There are known enhancing lesions reported as metastatic disease again noting some focal edema in the paramedian right temporal occipital junction.  No new focal edema or intracranial mass effect    No extra-axial blood or fluid collections.    The cranium appears intact.    Bilateral pseudophakia.    Smoking related changes in partially visualized lung apices.    Mild cervical spondylosis.      CTA:    The aortic arch demonstratesmild calcification, but no significant stenosis at the major branch vessel origins.  Aberrant right subclavian artery.    The right common carotid artery is normal in caliber.  Few small calcifications, without significant stenosis at the carotid bifurcation.The right internal carotid artery is normal in caliber.    The left common carotid artery is normal in caliber. Few small calcifications, without significant stenosis at the carotid bifurcation.The left internal carotid artery is normal in caliber.    The right vertebral artery is developmentally hypoplastic without focal narrowing.    The left vertebral artery is normal in caliber.  Mild calcification in the intracranial segment.    Basilar artery is within normal limits without focal abnormality.    The proximal anterior, middle, and posterior cerebral arteries are within normal limits.  No evidence of significant stenosis, focal occlusion, or intracranial aneurysm.

## 2025-02-10 NOTE — TELEPHONE ENCOUNTER
----- Message from Prince Jimenez sent at 2/9/2025  7:45 PM CST -----  Regarding: ED follow up  Contact: [phone]  Hello,     This patient was seen in the Hospital for left visual field loss. Can they please get follow up in neuro ophthalmology clinic in 2-3 months?    Best contact number - [phone] (daughter)    Thank you!  Prince Jimenez MD MSc  [employer] Ophthalmology PGY2

## 2025-02-11 ENCOUNTER — DOCUMENTATION ONLY (OUTPATIENT)
Dept: AUDIOLOGY | Facility: CLINIC | Age: 80
End: 2025-02-11
Payer: MEDICARE

## 2025-02-11 LAB
ACID FAST MOD KINY STN SPEC: NORMAL
ALBUMIN SERPL BCP-MCNC: 3 G/DL (ref 3.5–5.2)
ALP SERPL-CCNC: 76 U/L (ref 40–150)
ALT SERPL W/O P-5'-P-CCNC: 14 U/L (ref 10–44)
ANION GAP SERPL CALC-SCNC: 10 MMOL/L (ref 8–16)
AST SERPL-CCNC: 11 U/L (ref 10–40)
BASOPHILS # BLD AUTO: 0.02 K/UL (ref 0–0.2)
BASOPHILS NFR BLD: 0.3 % (ref 0–1.9)
BILIRUB SERPL-MCNC: 0.5 MG/DL (ref 0.1–1)
BUN SERPL-MCNC: 34 MG/DL (ref 8–23)
CALCIUM SERPL-MCNC: 8.9 MG/DL (ref 8.7–10.5)
CHLORIDE SERPL-SCNC: 107 MMOL/L (ref 95–110)
CO2 SERPL-SCNC: 25 MMOL/L (ref 23–29)
CREAT SERPL-MCNC: 0.9 MG/DL (ref 0.5–1.4)
DIFFERENTIAL METHOD BLD: ABNORMAL
EOSINOPHIL # BLD AUTO: 0 K/UL (ref 0–0.5)
EOSINOPHIL NFR BLD: 0 % (ref 0–8)
ERYTHROCYTE [DISTWIDTH] IN BLOOD BY AUTOMATED COUNT: 14.1 % (ref 11.5–14.5)
EST. GFR  (NO RACE VARIABLE): >60 ML/MIN/1.73 M^2
GLUCOSE SERPL-MCNC: 135 MG/DL (ref 70–110)
HCT VFR BLD AUTO: 37.1 % (ref 40–54)
HGB BLD-MCNC: 11.7 G/DL (ref 14–18)
IMM GRANULOCYTES # BLD AUTO: 0.19 K/UL (ref 0–0.04)
IMM GRANULOCYTES NFR BLD AUTO: 2.4 % (ref 0–0.5)
LYMPHOCYTES # BLD AUTO: 0.4 K/UL (ref 1–4.8)
LYMPHOCYTES NFR BLD: 5.1 % (ref 18–48)
MAGNESIUM SERPL-MCNC: 1.8 MG/DL (ref 1.6–2.6)
MCH RBC QN AUTO: 27.3 PG (ref 27–31)
MCHC RBC AUTO-ENTMCNC: 31.5 G/DL (ref 32–36)
MCV RBC AUTO: 87 FL (ref 82–98)
MONOCYTES # BLD AUTO: 0.6 K/UL (ref 0.3–1)
MONOCYTES NFR BLD: 7.5 % (ref 4–15)
MYCOBACTERIUM SPEC QL CULT: NORMAL
NEUTROPHILS # BLD AUTO: 6.8 K/UL (ref 1.8–7.7)
NEUTROPHILS NFR BLD: 84.7 % (ref 38–73)
NRBC BLD-RTO: 0 /100 WBC
PHOSPHATE SERPL-MCNC: 2.8 MG/DL (ref 2.7–4.5)
PLATELET # BLD AUTO: 191 K/UL (ref 150–450)
PMV BLD AUTO: 10.6 FL (ref 9.2–12.9)
POTASSIUM SERPL-SCNC: 3.9 MMOL/L (ref 3.5–5.1)
PROT SERPL-MCNC: 5.8 G/DL (ref 6–8.4)
RBC # BLD AUTO: 4.29 M/UL (ref 4.6–6.2)
SODIUM SERPL-SCNC: 142 MMOL/L (ref 136–145)
WBC # BLD AUTO: 7.98 K/UL (ref 3.9–12.7)

## 2025-02-11 PROCEDURE — 25000242 PHARM REV CODE 250 ALT 637 W/ HCPCS: Performed by: STUDENT IN AN ORGANIZED HEALTH CARE EDUCATION/TRAINING PROGRAM

## 2025-02-11 PROCEDURE — 94640 AIRWAY INHALATION TREATMENT: CPT

## 2025-02-11 PROCEDURE — 83735 ASSAY OF MAGNESIUM: CPT

## 2025-02-11 PROCEDURE — 97116 GAIT TRAINING THERAPY: CPT | Mod: CQ

## 2025-02-11 PROCEDURE — 85025 COMPLETE CBC W/AUTO DIFF WBC: CPT | Performed by: STUDENT IN AN ORGANIZED HEALTH CARE EDUCATION/TRAINING PROGRAM

## 2025-02-11 PROCEDURE — 97530 THERAPEUTIC ACTIVITIES: CPT | Mod: CO

## 2025-02-11 PROCEDURE — 97110 THERAPEUTIC EXERCISES: CPT | Mod: CO

## 2025-02-11 PROCEDURE — 25000003 PHARM REV CODE 250

## 2025-02-11 PROCEDURE — 25000242 PHARM REV CODE 250 ALT 637 W/ HCPCS

## 2025-02-11 PROCEDURE — 80053 COMPREHEN METABOLIC PANEL: CPT | Performed by: STUDENT IN AN ORGANIZED HEALTH CARE EDUCATION/TRAINING PROGRAM

## 2025-02-11 PROCEDURE — 25000003 PHARM REV CODE 250: Performed by: STUDENT IN AN ORGANIZED HEALTH CARE EDUCATION/TRAINING PROGRAM

## 2025-02-11 PROCEDURE — 99900035 HC TECH TIME PER 15 MIN (STAT)

## 2025-02-11 PROCEDURE — 63600175 PHARM REV CODE 636 W HCPCS: Performed by: STUDENT IN AN ORGANIZED HEALTH CARE EDUCATION/TRAINING PROGRAM

## 2025-02-11 PROCEDURE — 84100 ASSAY OF PHOSPHORUS: CPT

## 2025-02-11 PROCEDURE — 36415 COLL VENOUS BLD VENIPUNCTURE: CPT | Performed by: STUDENT IN AN ORGANIZED HEALTH CARE EDUCATION/TRAINING PROGRAM

## 2025-02-11 PROCEDURE — 97530 THERAPEUTIC ACTIVITIES: CPT | Mod: CQ

## 2025-02-11 PROCEDURE — 20600001 HC STEP DOWN PRIVATE ROOM

## 2025-02-11 PROCEDURE — 27100171 HC OXYGEN HIGH FLOW UP TO 24 HOURS

## 2025-02-11 PROCEDURE — 63600175 PHARM REV CODE 636 W HCPCS

## 2025-02-11 PROCEDURE — 94761 N-INVAS EAR/PLS OXIMETRY MLT: CPT

## 2025-02-11 RX ORDER — ENOXAPARIN SODIUM 100 MG/ML
40 INJECTION SUBCUTANEOUS EVERY 24 HOURS
Status: DISCONTINUED | OUTPATIENT
Start: 2025-02-11 | End: 2025-02-12 | Stop reason: HOSPADM

## 2025-02-11 RX ADMIN — ATORVASTATIN CALCIUM 80 MG: 40 TABLET, FILM COATED ORAL at 09:02

## 2025-02-11 RX ADMIN — ROFLUMILAST 500 MCG: 500 TABLET ORAL at 09:02

## 2025-02-11 RX ADMIN — ALBUTEROL SULFATE 2 PUFF: 108 INHALANT RESPIRATORY (INHALATION) at 08:02

## 2025-02-11 RX ADMIN — LATANOPROST 1 DROP: 50 SOLUTION OPHTHALMIC at 09:02

## 2025-02-11 RX ADMIN — FLUTICASONE PROPIONATE 100 MCG: 50 SPRAY, METERED NASAL at 09:02

## 2025-02-11 RX ADMIN — DEXAMETHASONE 4 MG: 4 TABLET ORAL at 09:02

## 2025-02-11 RX ADMIN — MEMANTINE HYDROCHLORIDE 5 MG: 5 TABLET ORAL at 09:02

## 2025-02-11 RX ADMIN — LOSARTAN POTASSIUM 100 MG: 50 TABLET, FILM COATED ORAL at 09:02

## 2025-02-11 RX ADMIN — FUROSEMIDE 20 MG: 20 TABLET ORAL at 09:02

## 2025-02-11 RX ADMIN — TIOTROPIUM BROMIDE INHALATION SPRAY 2 PUFF: 3.12 SPRAY, METERED RESPIRATORY (INHALATION) at 09:02

## 2025-02-11 RX ADMIN — ENOXAPARIN SODIUM 40 MG: 40 INJECTION SUBCUTANEOUS at 05:02

## 2025-02-11 RX ADMIN — LEVETIRACETAM 500 MG: 500 TABLET, FILM COATED ORAL at 09:02

## 2025-02-11 RX ADMIN — ASPIRIN 81 MG: 81 TABLET, COATED ORAL at 09:02

## 2025-02-11 RX ADMIN — PANTOPRAZOLE SODIUM 40 MG: 40 TABLET, DELAYED RELEASE ORAL at 09:02

## 2025-02-11 RX ADMIN — TRAZODONE HYDROCHLORIDE 50 MG: 50 TABLET ORAL at 09:02

## 2025-02-11 RX ADMIN — FLUTICASONE FUROATE AND VILANTEROL TRIFENATATE 1 PUFF: 100; 25 POWDER RESPIRATORY (INHALATION) at 09:02

## 2025-02-11 RX ADMIN — AMITRIPTYLINE HYDROCHLORIDE 25 MG: 25 TABLET, FILM COATED ORAL at 09:02

## 2025-02-11 NOTE — CONSULTS
Inpatient consult to Physical Medicine Rehab  Consult performed by: Lacey Mai NP  Consult ordered by: Deuce Patiño MD  Reason for consult: Rehab      Consult received.     DIONNA Dukes, FNP-C  Physical Medicine & Rehabilitation   02/11/2025

## 2025-02-11 NOTE — PLAN OF CARE
79 y.o. male with known brain mets from lung adenocarcinoma, s/p multiple GK treatments, presented 2/7/25 after recent discharge for TIA concerns with new onset L sided visual deficits, confusion, and gait instability. He initially presented with right gaze deviation and left-sided weakness, though MRI was negative for acute CVA. He was discharged on Decadron with outpatient EEG planned. On return, he reported sudden left-sided visual loss in both eyes and an episode of confusion while driving.  Repeat MRI showed new T2 FLAIR hyperintensity in the posterior right temporal and occipital subarachnoid space, adjacent to a known metastatic lesion. Symptoms localize to the right occipital and possibly temporal lobes, corresponding with his new MRI findings. LP completed 2/10 with noninfectious appearing CSF, protein elevated at 68 c/w breakdown of BBB. CSF cytology still pending. 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 Pt with continued clinical improvement. No further inpatient Neuro recommendations, we will sign off. Please call with any questions or clarifications.     Michelle Navarro PA-C  General Neurology Consult

## 2025-02-11 NOTE — PT/OT/SLP PROGRESS
"Occupational Therapy   Treatment    Name: Jordin Allen Jr.  MRN: [id number]  Admitting Diagnosis:  Unspecified visual disturbance       Recommendations:     Discharge Recommendations: High Intensity Therapy  Discharge Equipment Recommendations:  walker, rolling  Barriers to discharge:  None    Assessment:     Jordin Allen Jr. is a 79 y.o. male with a medical diagnosis of Unspecified visual disturbance.  He presents with the following performance deficits affecting function are weakness, impaired endurance, impaired self care skills, impaired functional mobility, gait instability, impaired balance, visual deficits, decreased coordination, decreased safety awareness, decreased lower extremity function. Patient limited by inattention to L side and LSW affecting safety during functional mobility and standing ADLs. However, patient demonstrated improved activity tolerance and overall strength during therapeutic exercise and functional mobility training. Patient is higly motivated to return to [facility]. Patient has demonstrated sufficient progression to warrant high intensity therapy evidenced by objectives noted below.    Rehab Prognosis:  Good; patient would benefit from acute skilled OT services to address these deficits and reach maximum level of function.       Plan:     Patient to be seen 4 x/week to address the above listed problems via self-care/home management, therapeutic activities, therapeutic exercises, neuromuscular re-education  Plan of Care Expires: 03/12/25  Plan of Care Reviewed with: patient    Subjective     Chief Complaint: "None right now. I'm ready to get up."  Patient/Family Comments/goals: "I want to be able to get back to [employer]  Pain/Comfort:  Pain Rating 1: 0/10  Pain Rating Post-Intervention 1: 0/10    Objective:     Communicated with: nurse morales and OTR prior to session.  Patient found HOB elevated with bed alarm, telemetry (telesitter) upon OT entry to room.  A client care conference " was completed by the OTR and the SCHMIDT prior to treatment by the SCHMIDT to discuss the patient's POC and current status.    General Precautions: Standard, aspiration, fall    Orthopedic Precautions:N/A  Braces: N/A  Respiratory Status: Room air     Occupational Performance:     Bed Mobility:    Patient completed Supine to Sit with stand by assistance   Patient completed Sit to Supine with stand by assistance     Functional Mobility/Transfers:  Patient completed Sit <> Stand Transfer with contact guard assistance  with  no assistive device and min cues to visually attend to L side for improving safety insight   Functional Mobility: within room and hallways with focus on visual scanning with cervical/trunk rotation to L to maximize safety during navigation and identification activity, CGA, HHA; patient required max verbal cues to scan to L side    Activities of Daily Living:  Grooming: SET-UP facial hygiene seated EOB    Therapeutic Exercise:  Patient participated in the following with focus on improving muscular endurance and restoring muscular strength required for increased activity tolerance and (I) during ADL tasks. Instruction and facilitation provided to maintain proper form and joint integrity required to maximize appropriate muscle recruitment.  Wall push-ups x10 reps  Back supported standing BASSEM shoulder abd/add x10 reps  Back supported standing BASSEM shoulder flex/ext x10 reps  Wall supported BASSEM heel raises x10 reps  Contralateral UE wall weight bearing with high knee x10 reps each    AMPAC 6 Click ADL: 20    Treatment & Education:  Discussed current progress. Discussed possible shower ADL session next OT session if medically cleared. Addressed all patient questions/concerns within SCHMIDT scope of practice.     Patient left HOB elevated with all lines intact, call button in reach, and bed alarm on    GOALS:   Multidisciplinary Problems       Occupational Therapy Goals          Problem: Occupational Therapy     Goal Priority Disciplines Outcome Interventions   Occupational Therapy Goal     OT, PT/OT Progressing    Description: Goals to be met by: 3/12/2025     Patient will increase functional independence with ADLs by performing:    UE Dressing with Supervision.  LE Dressing with Supervision.  Grooming while standing at sink with Supervision.  Toileting from toilet with Supervision for hygiene and clothing management.   Step transfer with Supervision  Toilet transfer to toilet with Supervision.                         DME Justifications:   Jordin's mobility limitation cannot be sufficiently resolved by the use of a cane. His functional mobility deficit can be sufficiently resolved with the use of a Rolling Walker. Patient's mobility limitation significantly impairs their ability to participate in one of more activities of daily living.  The use of a RW will significantly improve the patient's ability to participate in MRADLS and the patient will use it on regular basis in the home.    Time Tracking:     OT Date of Treatment: 02/11/25  OT Start Time: 1406  OT Stop Time: 1429  OT Total Time (min): 23 min    Billable Minutes:Therapeutic Activity 13  Therapeutic Exercise 10    OT/JOSSUE: JOSSUE     Number of JOSSUE visits since last OT visit: 1    2/11/2025

## 2025-02-11 NOTE — PLAN OF CARE
AAOx4. Patient asked for respiratory treatment due to congestion, RT notified. No other complaints this shift. POC reviewed with patient; understanding verbalized. Pt. with nonskid footwear on, bed in lowest position, and locked with bed rails up x2.  Pt. instructed to call prior to getting OOB.  Pt. has call light and personal items within reach. Patient ambulates in room independently. VSS and afebrile this shift. Bed alarm on. Camera surveillance active. All questions and concerns addressed at this time.

## 2025-02-11 NOTE — PROGRESS NOTES
[facility] ([facility])  Hematology/Oncology  Progress Note    Patient Name: Jordin Allen Jr.  Admission Date: 2/7/2025  Hospital Length of Stay: 4 days  Code Status: Full Code     Subjective:     HPI:  Jordin Allen, 78 y/o Male with PMHx of lung cancer with brain Mets, HTN, HLD, CAD, obesity, ex-smoker, presents after discharge earlier in the day due to vision loss in his left eye.      Patient discharged earlier in the day after admission for TIA concerns. He was found face down on the ground last night and was noted to have a temporary gaze deficit to the right and worsening left sided weakness. MRI brain w/wo obtained that was negative for acute CVA. Recommended to have outpatient EEG. Discharged on Decadron.      Patient returned home and was changing clothes with his daughter. Daughter reports he suddenly said that he lost his vision. Describes not being able to see his left visual field out of both eyes. LKN was 2:30pm. Denies eye pain. Denies extremity weakness or sensory changes. No speech changes. No falls. Daughter reports he has been moving slower over the last few days and needing a cane. Also reports he has been easily agitated and inpatient. She did mention an episode of confusion while driving home from the hospital today. Reports he did not know what street he was on which is unusual for him. In the ED, vascular neuro consulted who recommended repeat MRI and EEG.    Interval History:  No acute events overnight.  LP yesterday not infectious appearing, still pending cytology to leptomeningeal disease and cultures.    Oncology Treatment Plan:   OP DURVALUMAB 1500 MG Q4W    Medications:  Continuous Infusions:  Scheduled Meds:   amitriptyline  25 mg Oral QHS    aspirin  81 mg Oral Daily    atorvastatin  80 mg Oral Daily    dexAMETHasone  4 mg Oral BID    enoxparin  40 mg Subcutaneous Q24H (prophylaxis, 1700)    fluticasone furoate-vilanteroL  1 puff Inhalation Daily    fluticasone propionate   2 spray Each Nostril Daily    furosemide  20 mg Oral Daily    latanoprost  1 drop Both Eyes QHS    levETIRAcetam  500 mg Oral BID    losartan  100 mg Oral Daily    memantine  5 mg Oral BID    pantoprazole  40 mg Oral Daily    roflumilast  1 tablet Oral Daily    tiotropium bromide  2 puff Inhalation Daily    traZODone  50 mg Oral QHS     PRN Meds:  Current Facility-Administered Medications:     acetaminophen, 650 mg, Oral, Q6H PRN    albuterol, 2 puff, Inhalation, Q4H PRN    dextrose 50%, 12.5 g, Intravenous, PRN    dextrose 50%, 25 g, Intravenous, PRN    glucagon (human recombinant), 1 mg, Intramuscular, PRN    glucose, 16 g, Oral, PRN    glucose, 24 g, Oral, PRN    ibuprofen, 600 mg, Oral, Q6H PRN    iohexoL, 100 mL, Intravenous, ONCE PRN    naloxone, 0.02 mg, Intravenous, PRN    nitroGLYCERIN, 0.4 mg, Sublingual, Q5 Min PRN    sodium chloride 0.9%, 10 mL, Intravenous, Q12H PRN     Review of Systems  Objective:     Vital Signs (Most Recent):  Temp: 97.8 °F (36.6 °C) (02/11/25 1102)  Pulse: 87 (02/11/25 1121)  Resp: 20 (02/11/25 1102)  BP: 129/82 (02/11/25 1102)  SpO2: 95 % (02/11/25 1220) Vital Signs (24h Range):  Temp:  [97.3 °F (36.3 °C)-98.6 °F (37 °C)] 97.8 °F (36.6 °C)  Pulse:  [] 87  Resp:  [18-20] 20  SpO2:  [94 %-98 %] 95 %  BP: (126-153)/(70-82) 129/82     Weight: 84.5 kg (186 lb 4.6 oz)  Body mass index is 27.51 kg/m².  Body surface area is 2.03 meters squared.      Intake/Output Summary (Last 24 hours) at 2/11/2025 1425  Last data filed at 2/11/2025 0800  Gross per 24 hour   Intake 450 ml   Output 550 ml   Net -100 ml        Physical Exam  Vitals reviewed.   Constitutional:       Appearance: Normal appearance.   HENT:      Head: Normocephalic and atraumatic.      Mouth/Throat:      Pharynx: Oropharynx is clear.   Eyes:      General: No scleral icterus.     Conjunctiva/sclera: Conjunctivae normal.   Cardiovascular:      Rate and Rhythm: Normal rate and regular rhythm.      Pulses: Normal pulses.       Heart sounds: Normal heart sounds.   Pulmonary:      Effort: Pulmonary effort is normal. No respiratory distress.   Abdominal:      General: Abdomen is flat.      Tenderness: There is no abdominal tenderness. There is no guarding or rebound.   Musculoskeletal:      Right lower leg: No edema.      Left lower leg: No edema.   Skin:     Coloration: Skin is not jaundiced.   Neurological:      Mental Status: He is alert and oriented to person, place, and time.   Psychiatric:         Mood and Affect: Mood normal.         Behavior: Behavior normal.          Significant Labs:   All pertinent labs from the last 24 hours have been reviewed.    Diagnostic Results:  I have reviewed and interpreted all pertinent imaging results/findings within the past 24 hours.  Assessment/Plan:     * Unspecified visual disturbance  78 y/o M with PMHx of lung adenocarcinoma w/mets to brain s/p GK x 3, previously on durvalumab but stopped given recent multiple episodes of pneumonitis discharged earlier today after admission for TIA. Patient returns with reports of vision disturbance, headache, and walking difficulty. Per ED, patient reports not being able to see left visual field out of both eyes, confusion, headaches, and needing to use cane to get around. HDS. Patient reports overall vision blurriness. On examination, noted issues with tracking and convergence, no overly convincing visual field defects noted. Initial and repeat CTA stroke no evidence of acute hemorrhage or vascular distribution. Repeat MRI Brain with new T2 FLAIR signal abnormality in the subarachnoid space overlying the posterior right temporal and right occipital lobes. Ddx noninfectious vs noninfectious vs subarachnoid hemorrhage vs Leptomeningeal metastatic. Overall impression likely related related to know metastatic lesion vs side effects of GK versus meningitis, will evaluate with LP.  LP was not concerning for infection, still pending cytology for leptomeningeal  disease rule out, and cultures.  Given rapid onset of symptoms new metastasis is less likely.  He will outpatient follow-up with neurology to determine if EEG is indicated.  Unclear what is causing his symptoms, but he has improved while in the hospital without antibiotics, and some dexamethasone to treat potential post radiation edema..    Plan:   - Neurology consulted, recommending empiric treatment for meningitis, we will start after LP.  - Ophthalmology consulted, recommend outpatient follow-up, continuing home lantoprost drops  - Neuro checks q 4 hours  - Continue dexamethasone  - Continue memantine   - F/u LP cytology and culture      TIA (transient ischemic attack)    Plan:   - Continue home ASA + statin     Metastasis to brain  Follows w/Dr. Ponce   On Cycle 4 of Durvalumab  MRI Brain 4/3/24 demonstrated a 0.8 cm Left thalamic metastasis  MRI Brain 7/26/24 demonstrated interval increase in size to 2.2 cm; no other evidence of intracranial disease.  MRI Brain 10/7/24 demonstrated new 1.9 cm Right medial temporo-occipital metastasis.   MRI Brain 1/24/25 demonstrated a 0.4 cm posterior Right temporal lobe met   S/p Gamma Knife in 8/24, 10/24, and 1/25    Adenocarcinoma, lung, left  Follows w/Dr. Ponce   On Cycle 4 of Durvalumab  Mets to brain     CAD (coronary artery disease)    Plan:   Continue home ASA + statin     HTN (hypertension), benign    Plan:   - Continue home losartan     GERD (gastroesophageal reflux disease)    Plan:   - Continue home protonix     Centrilobular emphysema    Plan:   - Continue home roflumilast   - PRN albuterol HFA  -p.r.n. breathing treatments             Deuce Patiño MD  Hematology/Oncology  [facility] ([facility])

## 2025-02-11 NOTE — PROGRESS NOTES
2/11/2025    Hearing Aid Follow-up    Jordin Allen Jr. was seen today inpatient and issued his hearing aids back from in warranty repair. He is now fit with the following devices:      [employer] [facility] R D Ttn [place] [address] LI Rechargeable   NEW RT SN  [id number]   NEW LT SN   [id number]   : P3   Dome RT: Medium Sleeve Vented   Dome LT: Medium Sleeve Power  Warranty Exp 02/03/2026      HEARING AIDS WERE REPLACED     Mr. Allen returned the 4Cable TV hearing aids (SNs: [id number]) in good condition. The physical fit of the hearing aids is good and Mr. Allen reported good subjective benefit with the changes made today.    Recommendations:  Daily and consistent use of amplification  Hearing aid follow-up in the clinic after discharge  Change wax filters every 3 months (January, April, July, October)  Use alcohol wipe to clean dome, replace as needed  Annual audiologic evaluation or sooner if change perceived  Hearing protection in noise

## 2025-02-11 NOTE — SUBJECTIVE & OBJECTIVE
Interval History:  No acute events overnight.  LP yesterday not infectious appearing, still pending cytology to leptomeningeal disease and cultures.    Oncology Treatment Plan:   OP DURVALUMAB 1500 MG Q4W    Medications:  Continuous Infusions:  Scheduled Meds:   amitriptyline  25 mg Oral QHS    aspirin  81 mg Oral Daily    atorvastatin  80 mg Oral Daily    dexAMETHasone  4 mg Oral BID    enoxparin  40 mg Subcutaneous Q24H (prophylaxis, 1700)    fluticasone furoate-vilanteroL  1 puff Inhalation Daily    fluticasone propionate  2 spray Each Nostril Daily    furosemide  20 mg Oral Daily    latanoprost  1 drop Both Eyes QHS    levETIRAcetam  500 mg Oral BID    losartan  100 mg Oral Daily    memantine  5 mg Oral BID    pantoprazole  40 mg Oral Daily    roflumilast  1 tablet Oral Daily    tiotropium bromide  2 puff Inhalation Daily    traZODone  50 mg Oral QHS     PRN Meds:  Current Facility-Administered Medications:     acetaminophen, 650 mg, Oral, Q6H PRN    albuterol, 2 puff, Inhalation, Q4H PRN    dextrose 50%, 12.5 g, Intravenous, PRN    dextrose 50%, 25 g, Intravenous, PRN    glucagon (human recombinant), 1 mg, Intramuscular, PRN    glucose, 16 g, Oral, PRN    glucose, 24 g, Oral, PRN    ibuprofen, 600 mg, Oral, Q6H PRN    iohexoL, 100 mL, Intravenous, ONCE PRN    naloxone, 0.02 mg, Intravenous, PRN    nitroGLYCERIN, 0.4 mg, Sublingual, Q5 Min PRN    sodium chloride 0.9%, 10 mL, Intravenous, Q12H PRN     Review of Systems  Objective:     Vital Signs (Most Recent):  Temp: 97.8 °F (36.6 °C) (02/11/25 1102)  Pulse: 87 (02/11/25 1121)  Resp: 20 (02/11/25 1102)  BP: 129/82 (02/11/25 1102)  SpO2: 95 % (02/11/25 1220) Vital Signs (24h Range):  Temp:  [97.3 °F (36.3 °C)-98.6 °F (37 °C)] 97.8 °F (36.6 °C)  Pulse:  [] 87  Resp:  [18-20] 20  SpO2:  [94 %-98 %] 95 %  BP: (126-153)/(70-82) 129/82     Weight: 84.5 kg (186 lb 4.6 oz)  Body mass index is 27.51 kg/m².  Body surface area is 2.03 meters  squared.      Intake/Output Summary (Last 24 hours) at 2/11/2025 1425  Last data filed at 2/11/2025 0800  Gross per 24 hour   Intake 450 ml   Output 550 ml   Net -100 ml        Physical Exam  Vitals reviewed.   Constitutional:       Appearance: Normal appearance.   HENT:      Head: Normocephalic and atraumatic.      Mouth/Throat:      Pharynx: Oropharynx is clear.   Eyes:      General: No scleral icterus.     Conjunctiva/sclera: Conjunctivae normal.   Cardiovascular:      Rate and Rhythm: Normal rate and regular rhythm.      Pulses: Normal pulses.      Heart sounds: Normal heart sounds.   Pulmonary:      Effort: Pulmonary effort is normal. No respiratory distress.   Abdominal:      General: Abdomen is flat.      Tenderness: There is no abdominal tenderness. There is no guarding or rebound.   Musculoskeletal:      Right lower leg: No edema.      Left lower leg: No edema.   Skin:     Coloration: Skin is not jaundiced.   Neurological:      Mental Status: He is alert and oriented to person, place, and time.   Psychiatric:         Mood and Affect: Mood normal.         Behavior: Behavior normal.          Significant Labs:   All pertinent labs from the last 24 hours have been reviewed.    Diagnostic Results:  I have reviewed and interpreted all pertinent imaging results/findings within the past 24 hours.

## 2025-02-11 NOTE — ASSESSMENT & PLAN NOTE
78 y/o M with PMHx of lung adenocarcinoma w/mets to brain s/p GK x 3, previously on durvalumab but stopped given recent multiple episodes of pneumonitis discharged earlier today after admission for TIA. Patient returns with reports of vision disturbance, headache, and walking difficulty. Per ED, patient reports not being able to see left visual field out of both eyes, confusion, headaches, and needing to use cane to get around. HDS. Patient reports overall vision blurriness. On examination, noted issues with tracking and convergence, no overly convincing visual field defects noted. Initial and repeat CTA stroke no evidence of acute hemorrhage or vascular distribution. Repeat MRI Brain with new T2 FLAIR signal abnormality in the subarachnoid space overlying the posterior right temporal and right occipital lobes. Ddx noninfectious vs noninfectious vs subarachnoid hemorrhage vs Leptomeningeal metastatic. Overall impression likely related related to know metastatic lesion vs side effects of GK versus meningitis, will evaluate with LP.  LP was not concerning for infection, still pending cytology for leptomeningeal disease rule out, and cultures.  Given rapid onset of symptoms new metastasis is less likely.  He will outpatient follow-up with neurology to determine if EEG is indicated.  Unclear what is causing his symptoms, but he has improved while in the hospital without antibiotics, and some dexamethasone to treat potential post radiation edema..    Plan:   - Neurology consulted, recommending empiric treatment for meningitis, we will start after LP.  - Ophthalmology consulted, recommend outpatient follow-up, continuing home lantoprost drops  - Neuro checks q 4 hours  - Continue dexamethasone  - Continue memantine   - F/u LP cytology and culture

## 2025-02-11 NOTE — PT/OT/SLP PROGRESS
Physical Therapy Treatment    Patient Name:  Jordin Allen Jr.   MRN:  [id number]    Recommendations:     Discharge Recommendations: High Intensity Therapy  Discharge Equipment Recommendations: walker, rolling  Barriers to discharge: Inaccessible home and Decreased caregiver support    Assessment:     Jordin Allen Jr. is a 79 y.o. male admitted with a medical diagnosis of Unspecified visual disturbance.  He presents with the following impairments/functional limitations: weakness, impaired endurance, impaired self care skills, impaired functional mobility, gait instability . Patient showed excellent participation, with improved mobility and endurance. Heart rate and O2 sat were within normal limits today.    Rehab Prognosis: Good; patient would benefit from acute skilled PT services to address these deficits and reach maximum level of function.    Recent Surgery: * No surgery found *      Plan:     During this hospitalization, patient to be seen 4 x/week to address the identified rehab impairments via gait training, therapeutic activities, therapeutic exercises, neuromuscular re-education and progress toward the following goals:    Plan of Care Expires:  03/11/25    Subjective     Chief Complaint: none stated  Patient/Family Comments/goals: to reagin his independence  Pain/Comfort:  Pain Rating 1: 0/10  Pain Rating Post-Intervention 1: 0/10      Objective:     Communicated with NSG prior to session.  Patient found HOB elevated with telemetry, bed alarm upon PT entry to room.     General Precautions: Standard, aspiration, fall  Orthopedic Precautions: N/A  Braces: N/A  Respiratory Status: Room air     Functional Mobility:  Bed Mobility:     Rolling Left:  supervision  Scooting: supervision  Supine to Sit: supervision  Sit to Supine: supervision  Transfers:     Sit to Stand:  contact guard assistance with rolling walker  Bed to Chair: contact guard assistance with  rolling walker  using  Stand Pivot  Gait: 52 ft x  2 trials with RW and CGA, with cues to correct downward gaze and chair in tow.      AM-PAC 6 CLICK MOBILITY  Turning over in bed (including adjusting bedclothes, sheets and blankets)?: 4  Sitting down on and standing up from a chair with arms (e.g., wheelchair, bedside commode, etc.): 3  Moving from lying on back to sitting on the side of the bed?: 4  Moving to and from a bed to a chair (including a wheelchair)?: 3  Need to walk in hospital room?: 3  Climbing 3-5 steps with a railing?: 3  Basic Mobility Total Score: 20       Treatment & Education:  Donned/Doffed a second gown. Educated on posture and not getting up by himself in order to prevent any falls.    Patient left HOB elevated with all lines intact, call button in reach, bed alarm on, and Respiratory therapist present..    GOALS:   Multidisciplinary Problems       Physical Therapy Goals          Problem: Physical Therapy    Goal Priority Disciplines Outcome Interventions   Physical Therapy Goal     PT, PT/OT Progressing    Description: Goals to be met by: 25     Patient will increase functional independence with mobility by performin. Supine to sit with Stand-by Assistance  2. Sit to supine with Stand-by Assistance  3. Sit to stand transfer with Stand-by Assistance  4. Bed to chair transfer with Stand-by Assistance using Rolling Walker  5. Gait  x 50 feet with Stand-by Assistance using Rolling Walker.   6. Ascend/Descend 4 inch curb step with Stand-by Assistance using Rolling Walker.  7. Lower extremity exercise program x15 reps per handout, with assistance as needed                         DME Justifications:   Jordin's mobility limitation cannot be sufficiently resolved by the use of a cane. His functional mobility deficit can be sufficiently resolved with the use of a Rolling Walker. Patient's mobility limitation significantly impairs their ability to participate in one of more activities of daily living.  The use of a RW will significantly  improve the patient's ability to participate in MRADLS and the patient will use it on regular basis in the home.    Time Tracking:     PT Received On: 02/11/25  PT Start Time: 1153     PT Stop Time: 1217  PT Total Time (min): 24 min     Billable Minutes: Gait Training 12 and Therapeutic Activity 12    Treatment Type: Treatment  PT/PTA: PTA     Number of PTA visits since last PT visit: 1 02/11/2025

## 2025-02-12 VITALS
TEMPERATURE: 99 F | OXYGEN SATURATION: 96 % | WEIGHT: 186.31 LBS | RESPIRATION RATE: 20 BRPM | SYSTOLIC BLOOD PRESSURE: 141 MMHG | BODY MASS INDEX: 27.6 KG/M2 | HEART RATE: 76 BPM | HEIGHT: 69 IN | DIASTOLIC BLOOD PRESSURE: 77 MMHG

## 2025-02-12 LAB
ALBUMIN SERPL BCP-MCNC: 3 G/DL (ref 3.5–5.2)
ALP SERPL-CCNC: 81 U/L (ref 40–150)
ALT SERPL W/O P-5'-P-CCNC: 18 U/L (ref 10–44)
ANION GAP SERPL CALC-SCNC: 10 MMOL/L (ref 8–16)
AST SERPL-CCNC: 14 U/L (ref 10–40)
BASOPHILS # BLD AUTO: 0.04 K/UL (ref 0–0.2)
BASOPHILS NFR BLD: 0.4 % (ref 0–1.9)
BILIRUB SERPL-MCNC: 0.5 MG/DL (ref 0.1–1)
BUN SERPL-MCNC: 36 MG/DL (ref 8–23)
CALCIUM SERPL-MCNC: 8.9 MG/DL (ref 8.7–10.5)
CHLORIDE SERPL-SCNC: 105 MMOL/L (ref 95–110)
CO2 SERPL-SCNC: 27 MMOL/L (ref 23–29)
CREAT SERPL-MCNC: 1.2 MG/DL (ref 0.5–1.4)
DIFFERENTIAL METHOD BLD: ABNORMAL
EOSINOPHIL # BLD AUTO: 0 K/UL (ref 0–0.5)
EOSINOPHIL NFR BLD: 0 % (ref 0–8)
ERYTHROCYTE [DISTWIDTH] IN BLOOD BY AUTOMATED COUNT: 14.4 % (ref 11.5–14.5)
EST. GFR  (NO RACE VARIABLE): >60 ML/MIN/1.73 M^2
FINAL PATHOLOGIC DIAGNOSIS: NORMAL
GLUCOSE SERPL-MCNC: 109 MG/DL (ref 70–110)
HCT VFR BLD AUTO: 39.9 % (ref 40–54)
HGB BLD-MCNC: 12.5 G/DL (ref 14–18)
IMM GRANULOCYTES # BLD AUTO: 0.43 K/UL (ref 0–0.04)
IMM GRANULOCYTES NFR BLD AUTO: 4.1 % (ref 0–0.5)
LACTATE DEHYDROGENASE FLUID: 41 U/L
LYMPHOCYTES # BLD AUTO: 0.8 K/UL (ref 1–4.8)
LYMPHOCYTES NFR BLD: 7.9 % (ref 18–48)
Lab: NORMAL
MAGNESIUM SERPL-MCNC: 1.8 MG/DL (ref 1.6–2.6)
MCH RBC QN AUTO: 27.2 PG (ref 27–31)
MCHC RBC AUTO-ENTMCNC: 31.3 G/DL (ref 32–36)
MCV RBC AUTO: 87 FL (ref 82–98)
MICROSCOPIC EXAM: NORMAL
MONOCYTES # BLD AUTO: 1.1 K/UL (ref 0.3–1)
MONOCYTES NFR BLD: 10.1 % (ref 4–15)
NEUTROPHILS # BLD AUTO: 8.2 K/UL (ref 1.8–7.7)
NEUTROPHILS NFR BLD: 77.5 % (ref 38–73)
NRBC BLD-RTO: 0 /100 WBC
PHOSPHATE SERPL-MCNC: 2.6 MG/DL (ref 2.7–4.5)
PLATELET # BLD AUTO: 192 K/UL (ref 150–450)
PMV BLD AUTO: 10.6 FL (ref 9.2–12.9)
POTASSIUM SERPL-SCNC: 3.6 MMOL/L (ref 3.5–5.1)
PROT SERPL-MCNC: 5.5 G/DL (ref 6–8.4)
RBC # BLD AUTO: 4.59 M/UL (ref 4.6–6.2)
SODIUM SERPL-SCNC: 142 MMOL/L (ref 136–145)
WBC # BLD AUTO: 10.55 K/UL (ref 3.9–12.7)

## 2025-02-12 PROCEDURE — 63600175 PHARM REV CODE 636 W HCPCS: Performed by: STUDENT IN AN ORGANIZED HEALTH CARE EDUCATION/TRAINING PROGRAM

## 2025-02-12 PROCEDURE — 99900035 HC TECH TIME PER 15 MIN (STAT)

## 2025-02-12 PROCEDURE — 25000003 PHARM REV CODE 250

## 2025-02-12 PROCEDURE — 97116 GAIT TRAINING THERAPY: CPT | Mod: CQ

## 2025-02-12 PROCEDURE — 80053 COMPREHEN METABOLIC PANEL: CPT | Performed by: STUDENT IN AN ORGANIZED HEALTH CARE EDUCATION/TRAINING PROGRAM

## 2025-02-12 PROCEDURE — 83735 ASSAY OF MAGNESIUM: CPT

## 2025-02-12 PROCEDURE — 85025 COMPLETE CBC W/AUTO DIFF WBC: CPT | Performed by: STUDENT IN AN ORGANIZED HEALTH CARE EDUCATION/TRAINING PROGRAM

## 2025-02-12 PROCEDURE — 94761 N-INVAS EAR/PLS OXIMETRY MLT: CPT

## 2025-02-12 PROCEDURE — 97535 SELF CARE MNGMENT TRAINING: CPT | Mod: CO

## 2025-02-12 PROCEDURE — 94660 CPAP INITIATION&MGMT: CPT

## 2025-02-12 PROCEDURE — 25000242 PHARM REV CODE 250 ALT 637 W/ HCPCS: Performed by: STUDENT IN AN ORGANIZED HEALTH CARE EDUCATION/TRAINING PROGRAM

## 2025-02-12 PROCEDURE — 36415 COLL VENOUS BLD VENIPUNCTURE: CPT | Performed by: STUDENT IN AN ORGANIZED HEALTH CARE EDUCATION/TRAINING PROGRAM

## 2025-02-12 PROCEDURE — 25000003 PHARM REV CODE 250: Performed by: STUDENT IN AN ORGANIZED HEALTH CARE EDUCATION/TRAINING PROGRAM

## 2025-02-12 PROCEDURE — 84100 ASSAY OF PHOSPHORUS: CPT

## 2025-02-12 PROCEDURE — 27100171 HC OXYGEN HIGH FLOW UP TO 24 HOURS

## 2025-02-12 RX ORDER — LEVETIRACETAM 500 MG/1
500 TABLET ORAL 2 TIMES DAILY
Start: 2025-02-12 | End: 2026-02-12

## 2025-02-12 RX ORDER — MEMANTINE HYDROCHLORIDE 5 MG/1
5 TABLET ORAL 2 TIMES DAILY
Start: 2025-02-12 | End: 2026-02-12

## 2025-02-12 RX ORDER — LANOLIN ALCOHOL/MO/W.PET/CERES
800 CREAM (GRAM) TOPICAL EVERY 4 HOURS
Status: DISCONTINUED | OUTPATIENT
Start: 2025-02-12 | End: 2025-02-12 | Stop reason: HOSPADM

## 2025-02-12 RX ADMIN — MEMANTINE HYDROCHLORIDE 5 MG: 5 TABLET ORAL at 08:02

## 2025-02-12 RX ADMIN — ATORVASTATIN CALCIUM 80 MG: 40 TABLET, FILM COATED ORAL at 08:02

## 2025-02-12 RX ADMIN — FLUTICASONE FUROATE AND VILANTEROL TRIFENATATE 1 PUFF: 100; 25 POWDER RESPIRATORY (INHALATION) at 08:02

## 2025-02-12 RX ADMIN — PANTOPRAZOLE SODIUM 40 MG: 40 TABLET, DELAYED RELEASE ORAL at 08:02

## 2025-02-12 RX ADMIN — FLUTICASONE PROPIONATE 100 MCG: 50 SPRAY, METERED NASAL at 08:02

## 2025-02-12 RX ADMIN — ASPIRIN 81 MG: 81 TABLET, COATED ORAL at 08:02

## 2025-02-12 RX ADMIN — ROFLUMILAST 500 MCG: 500 TABLET ORAL at 08:02

## 2025-02-12 RX ADMIN — FUROSEMIDE 20 MG: 20 TABLET ORAL at 08:02

## 2025-02-12 RX ADMIN — TIOTROPIUM BROMIDE INHALATION SPRAY 2 PUFF: 3.12 SPRAY, METERED RESPIRATORY (INHALATION) at 08:02

## 2025-02-12 RX ADMIN — POTASSIUM BICARBONATE 40 MEQ: 391 TABLET, EFFERVESCENT ORAL at 10:02

## 2025-02-12 RX ADMIN — DEXAMETHASONE 4 MG: 4 TABLET ORAL at 08:02

## 2025-02-12 RX ADMIN — LOSARTAN POTASSIUM 100 MG: 50 TABLET, FILM COATED ORAL at 08:02

## 2025-02-12 RX ADMIN — LEVETIRACETAM 500 MG: 500 TABLET, FILM COATED ORAL at 08:02

## 2025-02-12 RX ADMIN — Medication 800 MG: at 01:02

## 2025-02-12 NOTE — HOSPITAL COURSE
2/11/25: Participated w/ OT. Sit <> Stand Transfer with contact guard assistance  with  no assistive device and min cues to visually attend to L side for improving safety insight. Functional Mobility: within room and hallways with focus on visual scanning with cervical/trunk rotation to L to maximize safety during navigation and identification activity, CGA, HHA; patient required max verbal cues to scan to L side.   2/11/25: Participated w/ PT. Gait: 52 ft x 2 trials with RW and CGA, with cues to correct downward gaze and chair in tow.

## 2025-02-12 NOTE — PLAN OF CARE
Patient is AAOx4. No acute events this shift. Up with assist and family at bedside. Bed alarm on and tele sitter camera in place. Afebrile & VSS on room air. Skin tear wound care performed. No PRNs needed. Ambulates with assist to bathroom; educated on importance of I/O recording. CHG wipes provided and encouraged use. Tolerating diet with good intake and voiding without difficulty. Pt remaining free from falls or injury throughout shift; bed locked and in lowest position; call light within reach. POC reviewed with patient and family at bedside. Patient involved in care. All needs addressed. Frequent rounding performed. Patient is stable at this time.

## 2025-02-12 NOTE — DISCHARGE SUMMARY
"[facility] ([facility])  Hematology/Oncology  Discharge Summary      Patient Name: Jordin Allen Jr.  MRN: [id number]  Admission Date: 2/7/2025  Hospital Length of Stay: 5 days  Discharge Date and Time:  02/12/2025 3:58 PM  Attending Physician: Antonia Hope MD   Discharging Provider: Deuce Patiño MD  Primary Care Provider: Sierra Landry MD    HPI: Jordin Allen, 78 y/o Male with PMHx of lung cancer with brain Mets, HTN, HLD, CAD, obesity, ex-smoker, presents after discharge earlier in the day due to vision loss in his left eye.      Patient discharged earlier in the day after admission for TIA concerns. He was found face down on the ground last night and was noted to have a temporary gaze deficit to the right and worsening left sided weakness. MRI brain w/wo obtained that was negative for acute CVA. Recommended to have outpatient EEG. Discharged on Decadron.      Patient returned home and was changing clothes with his daughter. Daughter reports he suddenly said that he lost his vision. Describes not being able to see his left visual field out of both eyes. LKN was 2:30pm. Denies eye pain. Denies extremity weakness or sensory changes. No speech changes. No falls. Daughter reports he has been moving slower over the last few days and needing a cane. Also reports he has been easily agitated and inpatient. She did mention an episode of confusion while driving home from the hospital today. Reports he did not know what street he was on which is unusual for him. In the ED, vascular neuro consulted who recommended repeat MRI and EEG.    * No surgery found *     Hospital Course: Admitted to medical oncology with reports of left visual field loss out of both eyes. CTA Stroke with no evidence of acute hemorrhage or major vascular infarct. MRI Brain with "increased T2 Flair in the subarachnoid space overlying the posterior right temporal and right occipital lobes in the settin gof known metastatic lesion. " "Stable vasogenic edema. Infectious or noninfectious inflammatory meningitis difficult centered.  Small volume subarachnoid hemorrhage would additionally be considered, noting that there is no significant susceptibility-related signal loss in this region.  CSF sampling and/or a short-term follow-up noncontrast head CT may be of benefit in this regard.  Leptomeningeal metastatic disease would be considered somewhat less likely given relatively rapid change in appearance since the recent MRI". Neurology and ophthalmology consulted for vision loss w/u. On Decadron for vasogenic edema control. Started Memantine to mitigate cognitive effects of GK. PT/OT consulted recommending high intensity therapy. It does not appear that his symptoms are caused by a new metastasis because of their rapid onset, he has lumbar puncture was not concerning for infection, but he still has cytology and cultures pending.  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 He has gotten better with out antibiotics, but we will still likely need rehab. He has cytology and cultures from his CSF studies that are still pending. Also, he will need to follow up with Ophtho outpatient.           Physical Exam  Vitals reviewed.   Constitutional:       Appearance: Normal appearance.   HENT:      Head: Normocephalic and atraumatic.      Mouth/Throat:      Pharynx: Oropharynx is clear.   Eyes:      General: No scleral icterus.     Conjunctiva/sclera: Conjunctivae normal.   Cardiovascular:      Rate and Rhythm: Normal rate and regular rhythm.      Pulses: Normal pulses.      Heart sounds: Normal heart sounds.   Pulmonary:      Effort: Pulmonary effort is normal. No respiratory distress.   Abdominal:      General: Abdomen is flat.      Tenderness: There is no abdominal tenderness. There is no guarding or rebound.   Musculoskeletal:      Right lower leg: No edema.      Left lower leg: No edema. " "  Skin:     Coloration: Skin is not jaundiced.   Neurological:      Mental Status: He is alert and oriented to person, place, and time.   Psychiatric:         Mood and Affect: Mood normal.         Behavior: Behavior normal.   Goals of Care Treatment Preferences:  Code Status: Full Code          What is most important right now is to focus on remaining as independent as possible, symptom/pain control, improvement in condition but with limits to invasive therapies.  Accordingly, we have decided that the best plan to meet the patient's goals includes continuing with treatment.      Consults:   Consults (From admission, onward)          Status Ordering Provider     Inpatient consult to Physical Medicine Rehab  Once        Provider:  (Not yet assigned)    Completed YVES IBARRA     Inpatient consult to Ophthalmology  Once        Provider:  (Not yet assigned)    Completed SAMAD, MAWADAH     Inpatient Consult to Neurology Services (General Neurology)  Once        Provider:  (Not yet assigned)    Completed SAMAD, MAWADAH     Inpatient consult to Neurology Services (Vascular Neurology)  Once        Provider:  (Not yet assigned)    Completed RO MILLER            Significant Diagnostic Studies: Labs: CMP   Recent Labs   Lab 02/11/25  0307 02/12/25 0224    142   K 3.9 3.6    105   CO2 25 27   * 109   BUN 34* 36*   CREATININE 0.9 1.2   CALCIUM 8.9 8.9   PROT 5.8* 5.5*   ALBUMIN 3.0* 3.0*   BILITOT 0.5 0.5   ALKPHOS 76 81   AST 11 14   ALT 14 18   ANIONGAP 10 10    and CBC   Recent Labs   Lab 02/11/25  0307 02/12/25 0224   WBC 7.98 10.55   HGB 11.7* 12.5*   HCT 37.1* 39.9*    192     Microbiology: Blood Culture   Lab Results   Component Value Date    LABBLOO No growth after 5 days. 07/23/2024    LABBLOO No growth after 5 days. 07/23/2024   , Urine Culture  No results found for: "LABURIN", and CSF Culture:   Lab Results   Component Value Date    CSFCULTURE No Growth to date 02/10/2025 "     Radiology:   2/8/25 MRI Brain w/o contrast: Compared to recent MRI of the brain performed 02/07/2025, new T2 FLAIR signal abnormality in the subarachnoid space overlying the posterior right temporal and right occipital lobes.  Infectious or noninfectious inflammatory meningitis difficult centered.  Small volume subarachnoid hemorrhage would additionally be considered, noting that there is no significant susceptibility-related signal loss in this region.  CSF sampling and/or a short-term follow-up noncontrast head CT may be of benefit in this regard.  Leptomeningeal metastatic disease would be considered somewhat less likely given relatively rapid change in appearance since the recent MRI.  No acute infarct.     Stable edema around node metastatic lesion within the right temporal occipital junction.  Remaining lesions are less visible in the absence of intravenous contrast.     2/7/2025 CTA stroke: Findings: No evidence of acute hemorrhage or major vascular distribution infarct.     Patient with known small metastatic lesions, better delineated on the recent MRI.  No new worsening edema or intracranial mass effect.     CT arteriogram appears unchanged from recent study, again exhibiting modest scattered atherosclerotic calcification.  No evidence of a new high-grade stenosis or intracranial large vessel occlusion.       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] 04/02/2024 Yes    Aortic atherosclerosis [I70.0] 02/14/2023 Yes    Chronic obstructive pulmonary disease [J44.9]  Yes    History of smoking 30 or more pack years [Z87.891] 10/15/2015 Not  Applicable    CAD (coronary artery disease) [I25.10] 10/31/2013 Yes     Chronic    HTN (hypertension), benign [I10] 09/07/2012 Yes    SHOLA (obstructive sleep apnea) [G47.33] 09/07/2012 Yes    GERD (gastroesophageal reflux disease) [K21.9] 08/23/2012 Yes    Centrilobular emphysema [J43.2] 08/23/2012 Yes     Chronic      Problems Resolved During this Admission:    Diagnosis Date Noted Date Resolved POA    Acute focal neurological deficit [R29.818] 02/08/2025 02/09/2025 Unknown      Discharged Condition: good    Disposition: Rehab Facility    Follow Up:    Patient Instructions:   No discharge procedures on file.  Medications:  Reconciled Home Medications:      Medication List        START taking these medications      levETIRAcetam 500 MG Tab  Commonly known as: KEPPRA  Take 1 tablet (500 mg total) by mouth 2 (two) times daily.     memantine 5 MG Tab  Commonly known as: NAMENDA  Take 1 tablet (5 mg total) by mouth 2 (two) times daily.            CONTINUE taking these medications      * albuterol 0.63 mg/3 mL Nebu  Commonly known as: ACCUNEB  Inhale 3 mLs (0.63 mg total) by nebulization every 6 (six) hours as needed (if symptoms failed to improve with inhaler). Rescue     * albuterol 90 mcg/actuation inhaler  Commonly known as: PROVENTIL/VENTOLIN HFA  Inhale 2 puffs into the lungs every 4 (four) hours as needed for Wheezing.     amitriptyline 25 MG tablet  Commonly known as: ELAVIL  Take 1 tablet (25 mg total) by mouth once daily.     aspirin 81 MG EC tablet  Commonly known as: ECOTRIN  Take 81 mg by mouth once daily.     atorvastatin 80 MG tablet  Commonly known as: LIPITOR  TAKE 1 TABLET (80 MG TOTAL) BY MOUTH ONCE DAILY.     BREZTRI AEROSPHERE 160-9-4.8 mcg/actuation Hfaa  Generic drug: budesonide-glycopyr-formoterol  Inhale 2 puffs into the lungs 2 (two) times a day.     dexAMETHasone 4 MG Tab  Commonly known as: DECADRON  Take 1 tablet (4 mg total) by mouth 2 (two) times daily.     echinacea 400 mg Cap  Take 1  capsule by mouth once daily.     fluticasone propionate 50 mcg/actuation nasal spray  Commonly known as: FLONASE  Spray 2 sprays (100 mcg total) in Each Nostril once daily.     furosemide 20 MG tablet  Commonly known as: LASIX  Take 1 tablet (20 mg total) by mouth once daily.     latanoprost 0.005 % ophthalmic solution  Instill 1 drop into both eyes every night at bedtime     losartan 100 MG tablet  Commonly known as: COZAAR  Take 1 tablet (100 mg total) by mouth once daily.     nitroGLYCERIN 0.4 MG SL tablet  Commonly known as: NITROSTAT  Place 1 tablet (0.4 mg total) under the tongue every 5 (five) minutes as needed.     OCUVITE ORAL  Take 1 capsule by mouth once daily.     omeprazole 20 MG capsule  Commonly known as: PRILOSEC  Take 1 capsule (20 mg total) by mouth once daily.     roflumilast 500 mcg Tab  Commonly known as: DALIRESP  Take 1 tablet (500 mcg total) by mouth once daily.     SENNA LAXATIVE 8.6 mg tablet  Generic drug: senna  Take 1 tablet by mouth once daily.     traZODone 50 MG tablet  Commonly known as: DESYREL  Take 1 tablet (50 mg total) by mouth every evening.           * This list has 2 medication(s) that are the same as other medications prescribed for you. Read the directions carefully, and ask your doctor or other care provider to review them with you.                  Deuce Patiño MD  Hematology/Oncology  [facility] - Oncology ([facility])

## 2025-02-12 NOTE — ASSESSMENT & PLAN NOTE
- Repeat MRI revealed a new T2 FLAIR hyperintensity in the subarachnoid space overlying the posterior right temporal and right occipital lobes, adjacent to known metastatic lesions.   - Per Oncology, overall impression likely related to know metastatic lesion vs side effects of GK versus meningitis.  - LP completed 2/10 with noninfectious appearing CSF.   - Neurology recommending Keppra given increased risk of seizures.   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 Pt with continued clinical improvement. No further inpatient Neuro recommendations.   - Ophthalmology consulted and recommend outpatient follow-up, continuing home lantoprost drops.   - Continues dexamethasone and memantine.

## 2025-02-12 NOTE — PLAN OF CARE
AAOx4. No complaints this shift. POC reviewed with patient; understanding verbalized. Pt. with nonskid footwear on, bed in lowest position, and locked with bed rails up x2.  Pt. instructed to call prior to getting OOB.  Pt. has call light and personal items within reach. Patient ambulates in room independently but is a standby assist. VSS and afebrile this shift. All questions and concerns addressed at this time.

## 2025-02-12 NOTE — ASSESSMENT & PLAN NOTE
- Follows up with Oncology at [facility] and next appt 2/26.   - treatment is currently on hold

## 2025-02-12 NOTE — PT/OT/SLP PROGRESS
"Occupational Therapy   Treatment    Name: Jordin Allen Jr.  MRN: [id number]  Admitting Diagnosis:  Unspecified visual disturbance       Recommendations:     Discharge Recommendations: High Intensity Therapy  Discharge Equipment Recommendations:  walker, rolling  Barriers to discharge:  None    Assessment:     Jordin Allen Jr. is a 79 y.o. male with a medical diagnosis of Unspecified visual disturbance.  He presents with the following performance deficits affecting function are weakness, impaired endurance, visual deficits, impaired balance, gait instability, impaired functional mobility, impaired self care skills, decreased safety awareness, decreased lower extremity function, decreased coordination. Patient is progressing well with OT intervention demonstrating improved occupational performance and safety awareness during ADL focused session today (shower and dressing). Patient has demonstrated sufficient progression to warrant high intensity therapy evidenced by objectives noted below.    Rehab Prognosis:  Good; patient would benefit from acute skilled OT services to address these deficits and reach maximum level of function.       Plan:     Patient to be seen 4 x/week to address the above listed problems via self-care/home management, therapeutic activities, therapeutic exercises, neuromuscular re-education  Plan of Care Expires: 03/12/25  Plan of Care Reviewed with: patient    Subjective     Chief Complaint: none  Patient/Family Comments/goals: "I would love to!" Re: shower; anticipation for discharge today  Pain/Comfort:  Pain Rating 1: 0/10  Pain Rating Post-Intervention 1: 0/10    Objective:     Communicated with: nurse andrew prior to session.  Patient found HOB elevated with bed alarm, telemetry (telesitter) upon OT entry to room.    General Precautions: Standard, aspiration, fall    Orthopedic Precautions:N/A  Braces: N/A  Respiratory Status: Room air     Occupational Performance:     Bed Mobility:  "   Patient completed Supine to Sit with modified independence  Patient completed Sit to Supine with supervision     Functional Mobility/Transfers:  Patient completed Sit <> Stand Transfer with stand by assistance  with  no assistive device   Patient completed Toilet Transfer Step Transfer technique with contact guard assistance with  no AD  Patient completed  Shower Transfer Step Transfer technique with contact guard assistance with hand-held assist, grab bars, and shower bench  Functional Mobility: to/from bathroom 12 ft x2 with CGA, no AD    Activities of Daily Living:  Bathing: contact guard assistance for balance during standing washing using grab bar (majority of shower was completed seated with SBA)  Upper Body Dressing: stand by assistance don t-shirt standing  Lower Body Dressing: stand by assistance doff and don clean B socks via figure 4 technique seated  Toileting: stand by assistance undergarment management and perirectal hygiene seated post BM      AMPAC 6 Click ADL: 20    Treatment & Education:  Discussed current progress. Addressed all patient questions/concerns within [facility] scope of practice.     Patient left HOB elevated with call button in reach, bed alarm on, and nurse notified    GOALS:   Multidisciplinary Problems       Occupational Therapy Goals          Problem: Occupational Therapy    Goal Priority Disciplines Outcome Interventions   Occupational Therapy Goal     OT, PT/OT Progressing    Description: Goals to be met by: 3/12/2025     Patient will increase functional independence with ADLs by performing:    UE Dressing with Supervision.  LE Dressing with Supervision.  Grooming while standing at sink with Supervision.  Toileting from toilet with Supervision for hygiene and clothing management.   Step transfer with Supervision  Toilet transfer to toilet with Supervision.                         DME Justifications:   Jordin's mobility limitation cannot be sufficiently resolved by the use of a  cane. His functional mobility deficit can be sufficiently resolved with the use of a Rolling Walker. Patient's mobility limitation significantly impairs their ability to participate in one of more activities of daily living.  The use of a RW will significantly improve the patient's ability to participate in MRADLS and the patient will use it on regular basis in the home.    Time Tracking:     OT Date of Treatment: 02/12/25  OT Start Time: 1116  OT Stop Time: 1203  OT Total Time (min): 47 min    Billable Minutes:Self Care/Home Management 47    OT/JOSSUE: JOSSUE     Number of JOSSUE visits since last OT visit: 2    2/12/2025

## 2025-02-12 NOTE — HPI
"Neurology is consulted for unspecified visual disturbance in a 79 y.o. 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 The patient was discharged earlier today after admission for TIA concerns and returned with vision disturbance, headache, and difficulty walking.    Per ED, the patient described sudden inability to see his left visual field in both eyes, overall blurriness, and an episode of confusion while driving home from the hospital earlier today, stating he "did not know what street he was on," which is atypical for him. Additionally, his daughter reports progressive slowing of movement over the last few days, increased cane use, and behavioral changes including irritability. No reported extremity weakness, sensory deficits, falls, or speech changes.  He was found face down the night prior to admission with a temporary right gaze deviation and worsening left-sided weakness, prompting inpatient evaluation. MRI was negative for acute CVA, and he was discharged on Decadron with outpatient EEG planned. On examination, he exhibited difficulty with tracking and convergence, though no definitive visual field deficits were appreciated.    CTA stroke was negative for hemorrhage or large vessel occlusion. However, repeat MRI revealed a new T2 FLAIR hyperintensity in the subarachnoid space overlying the posterior right temporal and right occipital lobes, adjacent to known metastatic lesions.  "
80 y/o Male with PMHx of lung cancer with brain Mets, HTN, HLD, CAD, obesity, ex-smoker, presents after discharge earlier in the day due to vision loss in his left eye.     Patient discharged earlier in the day after admission for TIA concerns. He was found face down on the ground last night and was noted to have a temporary gaze deficit to the right and worsening left sided weakness. MRI brain w/wo obtained that was negative for acute CVA. Recommended to have outpatient EEG. Discharged on Decadron.      Patient returned home and was changing clothes with his daughter. Daughter reports he suddenly said that he lost his vision. Describes not being able to see his left visual field out of both eyes. LKN was 2:30pm. Denies eye pain. Denies extremity weakness or sensory changes. No speech changes. No falls. Daughter reports he has been moving slower over the last few days and needing a cane. Also reports he has been easily agitated and inpatient. She did mention an episode of confusion while driving home from the hospital today. Reports he did not know what street he was on which is unusual for him. In the ED, vascular neuro consulted who recommended repeat MRI and EEG.      
Jordin Allen Jr. is a 79 y.o. male with PMH of lung cancer with brain mets,  HTN, HLD, CAD that presents to the ED c/o vision loss. Patient was seen in the ED overnight after a n unwitnessed fall. He was found face down at 11:30pm 02/06/2025. EMS noted that he had a right gaze preference and left- sided weakness. On arrival to ED, stroke code activated. CT MP and MRI obtained showed no acute intracranial abnormality. EEG was recommended but patient left the ED before an EEG could be done. This afternoon, patient returned to the ED because as his daughter was driving him home, the patient began stating that he could not see out of his left eye. Nurse reported that daughter was told to return to the ED by outside provider. Exam significant for complete left hemianopsia .NIHSS 3. CTH/CTA stroke multiphase negative for acute abnormality. Determined not a candidate for acute stroke interventions, OOW for TNK and no LVO for thrombectomy. Etiology of presentation unclear a this time.  
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 The patient was discharged 2/7/25 earlier in day after admission for TIA concerns. He then returned for visual disturbance and difficulty walking. CTA stroke was negative for hemorrhage or large vessel occlusion. Repeat MRI revealed a new T2 FLAIR hyperintensity in the subarachnoid space overlying the posterior right temporal and right occipital lobes, adjacent to known metastatic lesions. Per Oncology, overall impression likely related to know metastatic lesion vs side effects of GK versus meningitis. LP completed 2/10 with noninfectious appearing CSF. Neurology recommending Keppra given increased risk of seizures. Per Neurology, presentation most concerning for radiation necrosis as infection and additional tumor seem unlikely. Pt with continued clinical improvement. No further inpatient Neuro recommendations. Ophthalmology consulted and recommend outpatient follow-up, continuing home lantoprost drops. Continues dexamethasone and memantine.     Functional History: Patient lives with daughter in a 2 story home. Prior to admission, I. DME: none.   
Jordin Allen, 78 y/o Male with PMHx of lung cancer with brain Mets, HTN, HLD, CAD, obesity, ex-smoker, presents after discharge earlier in the day due to vision loss in his left eye.      Patient discharged earlier in the day after admission for TIA concerns. He was found face down on the ground last night and was noted to have a temporary gaze deficit to the right and worsening left sided weakness. MRI brain w/wo obtained that was negative for acute CVA. Recommended to have outpatient EEG. Discharged on Decadron.      Patient returned home and was changing clothes with his daughter. Daughter reports he suddenly said that he lost his vision. Describes not being able to see his left visual field out of both eyes. LKN was 2:30pm. Denies eye pain. Denies extremity weakness or sensory changes. No speech changes. No falls. Daughter reports he has been moving slower over the last few days and needing a cane. Also reports he has been easily agitated and inpatient. She did mention an episode of confusion while driving home from the hospital today. Reports he did not know what street he was on which is unusual for him. In the ED, vascular neuro consulted who recommended repeat MRI and EEG.  
Implemented All Universal Safety Interventions:  [place] to call system. Call bell, personal items and telephone within reach. Instruct patient to call for assistance. Room bathroom lighting operational. Non-slip footwear when patient is off stretcher. Physically safe environment: no spills, clutter or unnecessary equipment. Stretcher in lowest position, wheels locked, appropriate side rails in place.

## 2025-02-12 NOTE — PLAN OF CARE
[facility]    FACILITY TRANSFER ORDERS      Patient Name: Jordin Allen Jr.  YOB: 1945    PCP: Sierra Landry MD   PCP Address: [address] [facility] [facility] [address] [facility] / [place] [address]  PCP Phone Number: [phone]  PCP Fax: [phone]    Encounter Date: 02/12/2025    Admit to: IPR    Vital Signs:  Routine    Diagnoses:   Active Hospital Problems    Diagnosis  POA    *Unspecified visual disturbance [H53.9]  Yes    Hypercoagulopathy [D68.59]  Yes     Adenocarcinoma, lung, left with brain mets  AC therapy         Immunocompromised state [D84.9]  Yes     Metastasis to brain     Adenocarcinoma, lung, left   COPD      TIA (transient ischemic attack) [G45.9]  Yes    Metastasis to brain [C79.31]  Yes    Adenocarcinoma, lung, left [C34.92]  Yes    Aortic atherosclerosis [I70.0]  Yes     Taking atorvastatin      Chronic obstructive pulmonary disease [J44.9]  Yes     Taking symbicort and spiriva and daliresp  Peak flow 270 l/min In April his Fev-1: 1.33 liters 47%.       History of smoking 30 or more pack years [Z87.891]  Not Applicable    CAD (coronary artery disease) [I25.10]  Yes     Chronic     -LHC (10/31/13) for stemi: pLAD 100%, Cx with Li's, mRCA 40%, LVEF 55%,. LVEDP 15   -Xience 3.0 x 33 mm to pLAD, post dilated with 3.5 NC and 4.0 NC.   -TTE (8/14/13): LVEF 55%, grade I diastolic dysfunction      HTN (hypertension), benign [I10]  Yes    SHOLA (obstructive sleep apnea) [G47.33]  Yes     CPAP at night      GERD (gastroesophageal reflux disease) [K21.9]  Yes    Centrilobular emphysema [J43.2]  Yes     Chronic      Resolved Hospital Problems    Diagnosis Date Resolved POA    Acute focal neurological deficit [R29.818] 02/09/2025 Unknown       Allergies:  Review of patient's allergies indicates:   Allergen Reactions    Brilinta [ticagrelor] Itching    Lisinopril      Other reaction(s): cough    Metoprolol succinate Rash       Diet: cardiac diet    Activities: Activity as tolerated    Goals  of Care Treatment Preferences:  Code Status: Full Code          What is most important right now is to focus on remaining as independent as possible, symptom/pain control, improvement in condition but with limits to invasive therapies.  Accordingly, we have decided that the best plan to meet the patient's goals includes continuing with treatment.      Nursing: Per Routine.      Labs: CBC and CMP  Weekly      CONSULTS:    Physical Therapy to evaluate and treat.  and Occupational Therapy to evaluate and treat.      WOUND CARE ORDERS  None    Medications: Review discharge medications with patient and family and provide education.         Medication List        START taking these medications      levETIRAcetam 500 MG Tab  Commonly known as: KEPPRA  Take 1 tablet (500 mg total) by mouth 2 (two) times daily.     memantine 5 MG Tab  Commonly known as: NAMENDA  Take 1 tablet (5 mg total) by mouth 2 (two) times daily.            CONTINUE taking these medications      * albuterol 0.63 mg/3 mL Nebu  Commonly known as: ACCUNEB  Inhale 3 mLs (0.63 mg total) by nebulization every 6 (six) hours as needed (if symptoms failed to improve with inhaler). Rescue     * albuterol 90 mcg/actuation inhaler  Commonly known as: PROVENTIL/VENTOLIN HFA  Inhale 2 puffs into the lungs every 4 (four) hours as needed for Wheezing.     amitriptyline 25 MG tablet  Commonly known as: ELAVIL  Take 1 tablet (25 mg total) by mouth once daily.     aspirin 81 MG EC tablet  Commonly known as: ECOTRIN  Take 81 mg by mouth once daily.     atorvastatin 80 MG tablet  Commonly known as: LIPITOR  TAKE 1 TABLET (80 MG TOTAL) BY MOUTH ONCE DAILY.     BREZTRI AEROSPHERE 160-9-4.8 mcg/actuation Hfaa  Generic drug: budesonide-glycopyr-formoterol  Inhale 2 puffs into the lungs 2 (two) times a day.     dexAMETHasone 4 MG Tab  Commonly known as: DECADRON  Take 1 tablet (4 mg total) by mouth 2 (two) times daily.     echinacea 400 mg Cap  Take 1 capsule by mouth once  daily.     fluticasone propionate 50 mcg/actuation nasal spray  Commonly known as: FLONASE  Spray 2 sprays (100 mcg total) in Each Nostril once daily.     furosemide 20 MG tablet  Commonly known as: LASIX  Take 1 tablet (20 mg total) by mouth once daily.     latanoprost 0.005 % ophthalmic solution  Instill 1 drop into both eyes every night at bedtime     losartan 100 MG tablet  Commonly known as: COZAAR  Take 1 tablet (100 mg total) by mouth once daily.     nitroGLYCERIN 0.4 MG SL tablet  Commonly known as: NITROSTAT  Place 1 tablet (0.4 mg total) under the tongue every 5 (five) minutes as needed.     OCUVITE ORAL  Take 1 capsule by mouth once daily.     omeprazole 20 MG capsule  Commonly known as: PRILOSEC  Take 1 capsule (20 mg total) by mouth once daily.     roflumilast 500 mcg Tab  Commonly known as: DALIRESP  Take 1 tablet (500 mcg total) by mouth once daily.     SENNA LAXATIVE 8.6 mg tablet  Generic drug: senna  Take 1 tablet by mouth once daily.     traZODone 50 MG tablet  Commonly known as: DESYREL  Take 1 tablet (50 mg total) by mouth every evening.           * This list has 2 medication(s) that are the same as other medications prescribed for you. Read the directions carefully, and ask your doctor or other care provider to review them with you.                    Immunizations Administered as of 2/12/2025       Name Date Dose VIS Date Route Exp Date    COVID-19, MRNA, LN-S, PF (Pfizer) (Purple Cap) 8/26/2021 0.3 mL -- Intramuscular --    Site: Left deltoid     : Pf[employer] Inc     Lot: [id number]     Comment: Adminis     COVID-19, MRNA, LN-S, PF (Pfizer) (Purple Cap) 2/2/2021  7:23 AM 0.3 mL 12/12/2020 Intramuscular 5/31/2021    Site: Left deltoid     Given By: Roberto Gay RN     : Pf[employer] Inc     Lot: [id number]     COVID-19, MRNA, LN-S, PF (Pfizer) (Purple Cap) 1/12/2021 11:22 AM 0.3 mL 12/12/2020 Intramuscular 4/30/2021    Site: Left deltoid     Given By: Roberto Gay RN      : Pf[employer] Inc     Lot: [id number]               Some patients may experience side effects after vaccination.  These may include fever, headache, muscle or joint aches.  Most symptoms resolve with 24-48 hours and do not require urgent medical evaluation unless they persist for more than 72 hours or symptoms are concerning for an unrelated medical condition.          _________________________________  Deuce Patiño MD  02/12/2025

## 2025-02-12 NOTE — CONSULTS
[facility] ([facility])  Physical Medicine & Rehab  Consult Note    Patient Name: Jordin Allen Jr.  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 The patient was discharged 2/7/25 earlier in day after admission for TIA concerns. He then returned for visual disturbance and difficulty walking. CTA stroke was negative for hemorrhage or large vessel occlusion. Repeat MRI revealed a new T2 FLAIR hyperintensity in the subarachnoid space overlying the posterior right temporal and right occipital lobes, adjacent to known metastatic lesions. Per Oncology, overall impression likely related to know metastatic lesion vs side effects of GK versus meningitis. LP completed 2/10 with noninfectious appearing CSF. Neurology recommending Keppra given increased risk of seizures. Per Neurology, presentation most concerning for radiation necrosis as infection and additional tumor seem unlikely. Pt with continued clinical improvement. No further inpatient Neuro recommendations. Ophthalmology consulted and recommend outpatient follow-up, continuing home lantoprost drops. Continues dexamethasone and memantine.     Functional History: Patient lives with daughter in a 2 story home. Prior to admission, I. DME: none.     Hospital Course:   2/11/25: Participated w/ OT. Sit <> Stand Transfer with contact guard assistance   with  no assistive device and min cues to visually attend to L side for improving safety insight. Functional Mobility: within room and hallways with focus on visual scanning with cervical/trunk rotation to L to maximize safety during navigation and identification activity, CGA, HHA; patient required max verbal cues to scan to L side.   2/11/25: Participated w/ PT. Gait: 52 ft x 2 trials with RW and CGA, with cues to correct downward gaze and chair in tow.    Past Medical History:   Diagnosis Date    Benign neoplasm of colon 10/01/2013    Bronchitis chronic     CAD (coronary artery disease) 10/31/2013    COPD (chronic obstructive pulmonary disease)     Emphysema of lung     Encounter for preventive health examination 3/21/2018    GERD (gastroesophageal reflux disease)     Hx of colonic polyps     Hypertension     NAFLD (nonalcoholic fatty liver disease) 03/27/2017    SHOLA on CPAP     RLS (restless legs syndrome)     Screen for colon cancer 08/30/2013     Past Surgical History:   Procedure Laterality Date    bilateral foot surgery  1993    CARDIAC CATHETERIZATION  2013    x1    CATARACT EXTRACTION, BILATERAL      COLON SURGERY  2013    Ace Mott MD    COLONOSCOPY N/A 3/21/2018    Procedure: COLONOSCOPY;  Surgeon: Terry Mott MD;  Location: [address]);  Service: Endoscopy;  Laterality: N/A;    COLONOSCOPY N/A 4/12/2019    Procedure: COLONOSCOPY;  Surgeon: John Mantilla MD;  Location: [address]);  Service: Endoscopy;  Laterality: N/A;    COLONOSCOPY N/A 5/31/2022    Procedure: COLONOSCOPY;  Surgeon: MEDINA Farris MD;  Location: [address]);  Service: Endoscopy;  Laterality: N/A;  fully vacc-inst portal-tb    ENDOBRONCHIAL ULTRASOUND Bilateral 4/30/2024    Procedure: ENDOBRONCHIAL ULTRASOUND (EBUS);  Surgeon: Naveed Aguero MD;  Location: [facility] OR;  Service: Pulmonary;  Laterality: Bilateral;    scrotal abscess removal       Review of patient's allergies indicates:   Allergen  Reactions    Brilinta [ticagrelor] Itching    Lisinopril      Other reaction(s): cough    Metoprolol succinate Rash       Scheduled Medications:    amitriptyline  25 mg Oral QHS    aspirin  81 mg Oral Daily    atorvastatin  80 mg Oral Daily    dexAMETHasone  4 mg Oral BID    enoxparin  40 mg Subcutaneous Q24H (prophylaxis, 1700)    fluticasone furoate-vilanteroL  1 puff Inhalation Daily    fluticasone propionate  2 spray Each Nostril Daily    furosemide  20 mg Oral Daily    latanoprost  1 drop Both Eyes QHS    levETIRAcetam  500 mg Oral BID    losartan  100 mg Oral Daily    memantine  5 mg Oral BID    pantoprazole  40 mg Oral Daily    roflumilast  1 tablet Oral Daily    tiotropium bromide  2 puff Inhalation Daily    traZODone  50 mg Oral QHS       PRN Medications:   Current Facility-Administered Medications:     acetaminophen, 650 mg, Oral, Q6H PRN    albuterol, 2 puff, Inhalation, Q4H PRN    dextrose 50%, 12.5 g, Intravenous, PRN    dextrose 50%, 25 g, Intravenous, PRN    glucagon (human recombinant), 1 mg, Intramuscular, PRN    glucose, 16 g, Oral, PRN    glucose, 24 g, Oral, PRN    ibuprofen, 600 mg, Oral, Q6H PRN    iohexoL, 100 mL, Intravenous, ONCE PRN    naloxone, 0.02 mg, Intravenous, PRN    nitroGLYCERIN, 0.4 mg, Sublingual, Q5 Min PRN    sodium chloride 0.9%, 10 mL, Intravenous, Q12H PRN    Family History       Problem Relation (Age of Onset)    Heart attack Mother    Heart disease Mother    Hypertension Mother    No Known Problems Sister, Daughter          Tobacco Use    Smoking status: Former     Current packs/day: 0.00     Average packs/day: 1 pack/day for 40.0 years (40.0 ttl pk-yrs)     Types: Cigarettes     Start date: 8/15/1972     Quit date: 8/15/2012     Years since quittin.5     Passive exposure: Never    Smokeless tobacco: Never   Substance and Sexual Activity    Alcohol use: Yes     Alcohol/week: 3.0 standard drinks of alcohol     Types: 3 Cans of beer per week     Comment: maybe 2-3   beers weekly    Drug use: No    Sexual activity: Yes     Partners: Female     Review of Systems   Constitutional:  Positive for activity change. Negative for fatigue and fever.   HENT:  Negative for trouble swallowing and voice change.    Eyes:  Positive for visual disturbance.   Respiratory:  Negative for cough and shortness of breath.    Cardiovascular:  Negative for chest pain and leg swelling.   Gastrointestinal:  Negative for abdominal distention and abdominal pain.   Musculoskeletal:  Positive for gait problem. Negative for back pain.   Skin:  Negative for color change and rash.   Neurological:  Positive for weakness. Negative for speech difficulty and numbness.   Psychiatric/Behavioral:  Negative for agitation and confusion.      Objective:     Vital Signs (Most Recent):  Temp: 97.4 °F (36.3 °C) (02/12/25 0802)  Pulse: 65 (02/12/25 0802)  Resp: 18 (02/12/25 0802)  BP: (!) 142/80 (02/12/25 0802)  SpO2: 95 % (02/12/25 0802)    Vital Signs (24h Range):  Temp:  [97.4 °F (36.3 °C)-97.8 °F (36.6 °C)] 97.4 °F (36.3 °C)  Pulse:  [57-87] 65  Resp:  [18-20] 18  SpO2:  [93 %-99 %] 95 %  BP: (110-158)/(69-91) 142/80     Body mass index is 27.51 kg/m².     Physical Exam  Vitals and nursing note reviewed.   Constitutional:       Appearance: He is well-developed.   HENT:      Head: Normocephalic and atraumatic.      Mouth/Throat:      Mouth: Mucous membranes are moist.   Pulmonary:      Effort: Pulmonary effort is normal. No respiratory distress.   Abdominal:      General: There is no distension.      Tenderness: There is no abdominal tenderness.   Musculoskeletal:         General: No deformity.      Cervical back: Neck supple.   Deconditioned    Skin:     General: Skin is warm and dry.   Neurological:      Mental Status: Mental status is at baseline.      Sensory: No sensory deficit.      Motor: Weakness present. No abnormal muscle tone.      Gait: Gait abnormal.   Psychiatric:         Mood and Affect: Mood normal.          Behavior: Behavior normal.     Diagnostic Results:   Labs: Reviewed  ECG: Reviewed  CT: Reviewed    Assessment/Plan:     * Unspecified visual disturbance  - Repeat MRI revealed a new T2 FLAIR hyperintensity in the subarachnoid space overlying the posterior right temporal and right occipital lobes, adjacent to known metastatic lesions.   - Per Oncology, overall impression likely related to know metastatic lesion vs side effects of GK versus meningitis.  - LP completed 2/10 with noninfectious appearing CSF.   - Neurology recommending Keppra given increased risk of seizures.   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 Pt with continued clinical improvement. No further inpatient Neuro recommendations.   - Ophthalmology consulted and recommend outpatient follow-up, continuing home lantoprost drops.   - Continues dexamethasone and memantine.     Metastasis to brain  - Follows up with Oncology at [facility] and next appt 2/26.   - treatment is currently on hold     Adenocarcinoma, lung, left  - mets to brain and on cycle 4 of Durvalumab- currently on hold per Oncology    SHOLA (obstructive sleep apnea)  - on CPAP at night    PM&R Recommendation:     At this time, the PM&R team has reviewed this patient's ongoing medical case including inpatient diagnosis, medical history, clinical examination, labs, vitals, current social and functional history to provide the post-acute recommendation as follows:     RECOMMENDATIONS: inpatient rehabilitation due to good motivation/participation with therapies, has been determined to tolerate 3 hours of therapy and good potential for recovery.     The patient will be admitted for comprehensive interdisciplinary inpatient rehabilitation to address the impairments due to medical diagnosis of Debility. The patient will benefit from an inpatient rehabilitation program to promote functional recovery, implement compensatory  strategies and will undergo assessment for needs for durable medical equipment for safe discharge to the community. This patient will benefit from a coordinated interdisciplinary rehabilitation program services that require close monitoring and treatment with 24-hour rehabilitative nursing and physical/occupational therapies for 3 hours/day for 5 days/week.This interdisciplinary program will be performed under the direction of a physiatrist.    We will continue to follow.     Thank you for your consult.     Lacey Mai NP  Department of Physical Medicine & Rehab  [facility] - Oncology ([facility])

## 2025-02-12 NOTE — ASSESSMENT & PLAN NOTE
Taking symbicort and spiriva and daliresp  Peak flow 270 l/min In April his Fev-1: 1.33 liters 47%.

## 2025-02-12 NOTE — PLAN OF CARE
Pt discharged to rehab per MD order. Patient is AAOx4. PIV removed and tele dc. Electrolytes closely monitored and replaced. Discharge teaching and follow up instructions provided to patient at bedside. Pt ambulating in room with steady gait; tolerating regular diet; voiding without difficulty. All VSS; O2 sats WNL. Pt left floor by wheelchair via EMS to [facility] rehab facility with belongings.

## 2025-02-12 NOTE — ASSESSMENT & PLAN NOTE
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 Patient returns with reports of vision disturbance, headache, and walking difficulty. Per ED, patient reports not being able to see left visual field out of both eyes, confusion, headaches, and needing to use cane to get around. HDS. Patient reports overall vision blurriness. On examination, noted issues with tracking and convergence, no overly convincing visual field defects noted. Initial and repeat CTA stroke no evidence of acute hemorrhage or vascular distribution. Repeat MRI Brain with new T2 FLAIR signal abnormality in the subarachnoid space overlying the posterior right temporal and right occipital lobes. Ddx noninfectious vs noninfectious vs subarachnoid hemorrhage vs Leptomeningeal metastatic. Overall impression likely related related to know metastatic lesion vs side effects of GK versus meningitis, will evaluate with LP.  LP was not concerning for infection, still pending cytology for leptomeningeal disease rule out, and cultures.  Given rapid onset of symptoms new metastasis is less likely.  He will outpatient follow-up with neurology to determine if EEG is indicated.  Unclear what is causing his symptoms, but he has improved while in the hospital without antibiotics, and some dexamethasone to treat potential post radiation edema..    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

## 2025-02-12 NOTE — SUBJECTIVE & OBJECTIVE
Past Medical History:   Diagnosis Date    Benign neoplasm of colon 10/01/2013    Bronchitis chronic     CAD (coronary artery disease) 10/31/2013    COPD (chronic obstructive pulmonary disease)     Emphysema of lung     Encounter for preventive health examination 3/21/2018    GERD (gastroesophageal reflux disease)     Hx of colonic polyps     Hypertension     NAFLD (nonalcoholic fatty liver disease) 03/27/2017    SHOLA on CPAP     RLS (restless legs syndrome)     Screen for colon cancer 08/30/2013     Past Surgical History:   Procedure Laterality Date    bilateral foot surgery  1993    CARDIAC CATHETERIZATION  2013    x1    CATARACT EXTRACTION, BILATERAL      COLON SURGERY  2013    Ace Mott MD    COLONOSCOPY N/A 3/21/2018    Procedure: COLONOSCOPY;  Surgeon: Terry Mott MD;  Location: [facility] (4TH FLR);  Service: Endoscopy;  Laterality: N/A;    COLONOSCOPY N/A 4/12/2019    Procedure: COLONOSCOPY;  Surgeon: John Mantilla MD;  Location: [facility] (4TH FLR);  Service: Endoscopy;  Laterality: N/A;    COLONOSCOPY N/A 5/31/2022    Procedure: COLONOSCOPY;  Surgeon: MEDINA Farris MD;  Location: [facility] (4TH FLR);  Service: Endoscopy;  Laterality: N/A;  fully vacc-inst portal-tb    ENDOBRONCHIAL ULTRASOUND Bilateral 4/30/2024    Procedure: ENDOBRONCHIAL ULTRASOUND (EBUS);  Surgeon: Naveed Aguero MD;  Location: [facility] OR;  Service: Pulmonary;  Laterality: Bilateral;    scrotal abscess removal       Review of patient's allergies indicates:   Allergen Reactions    Brilinta [ticagrelor] Itching    Lisinopril      Other reaction(s): cough    Metoprolol succinate Rash       Scheduled Medications:    amitriptyline  25 mg Oral QHS    aspirin  81 mg Oral Daily    atorvastatin  80 mg Oral Daily    dexAMETHasone  4 mg Oral BID    enoxparin  40 mg Subcutaneous Q24H (prophylaxis, 1700)    fluticasone furoate-vilanteroL  1 puff Inhalation Daily    fluticasone propionate  2 spray Each Nostril Daily    furosemide  20 mg  Oral Daily    latanoprost  1 drop Both Eyes QHS    levETIRAcetam  500 mg Oral BID    losartan  100 mg Oral Daily    memantine  5 mg Oral BID    pantoprazole  40 mg Oral Daily    roflumilast  1 tablet Oral Daily    tiotropium bromide  2 puff Inhalation Daily    traZODone  50 mg Oral QHS       PRN Medications:   Current Facility-Administered Medications:     acetaminophen, 650 mg, Oral, Q6H PRN    albuterol, 2 puff, Inhalation, Q4H PRN    dextrose 50%, 12.5 g, Intravenous, PRN    dextrose 50%, 25 g, Intravenous, PRN    glucagon (human recombinant), 1 mg, Intramuscular, PRN    glucose, 16 g, Oral, PRN    glucose, 24 g, Oral, PRN    ibuprofen, 600 mg, Oral, Q6H PRN    iohexoL, 100 mL, Intravenous, ONCE PRN    naloxone, 0.02 mg, Intravenous, PRN    nitroGLYCERIN, 0.4 mg, Sublingual, Q5 Min PRN    sodium chloride 0.9%, 10 mL, Intravenous, Q12H PRN    Family History       Problem Relation (Age of Onset)    Heart attack Mother    Heart disease Mother    Hypertension Mother    No Known Problems Sister, Daughter          Tobacco Use    Smoking status: Former     Current packs/day: 0.00     Average packs/day: 1 pack/day for 40.0 years (40.0 ttl pk-yrs)     Types: Cigarettes     Start date: 8/15/1972     Quit date: 8/15/2012     Years since quittin.5     Passive exposure: Never    Smokeless tobacco: Never   Substance and Sexual Activity    Alcohol use: Yes     Alcohol/week: 3.0 standard drinks of alcohol     Types: 3 Cans of beer per week     Comment: maybe 2-3  beers weekly    Drug use: No    Sexual activity: Yes     Partners: Female     Review of Systems   Constitutional:  Positive for activity change. Negative for fatigue and fever.   HENT:  Negative for trouble swallowing and voice change.    Eyes:  Positive for visual disturbance.   Respiratory:  Negative for cough and shortness of breath.    Cardiovascular:  Negative for chest pain and leg swelling.   Gastrointestinal:  Negative for abdominal distention and abdominal  pain.   Musculoskeletal:  Positive for gait problem. Negative for back pain.   Skin:  Negative for color change and rash.   Neurological:  Positive for weakness. Negative for speech difficulty and numbness.   Psychiatric/Behavioral:  Negative for agitation and confusion.      Objective:     Vital Signs (Most Recent):  Temp: 97.4 °F (36.3 °C) (02/12/25 0802)  Pulse: 65 (02/12/25 0802)  Resp: 18 (02/12/25 0802)  BP: (!) 142/80 (02/12/25 0802)  SpO2: 95 % (02/12/25 0802)    Vital Signs (24h Range):  Temp:  [97.4 °F (36.3 °C)-97.8 °F (36.6 °C)] 97.4 °F (36.3 °C)  Pulse:  [57-87] 65  Resp:  [18-20] 18  SpO2:  [93 %-99 %] 95 %  BP: (110-158)/(69-91) 142/80     Body mass index is 27.51 kg/m².     Physical Exam  Vitals and nursing note reviewed.   Constitutional:       Appearance: He is well-developed.   HENT:      Head: Normocephalic and atraumatic.      Mouth/Throat:      Mouth: Mucous membranes are moist.   Pulmonary:      Effort: Pulmonary effort is normal. No respiratory distress.   Abdominal:      General: There is no distension.      Tenderness: There is no abdominal tenderness.   Musculoskeletal:         General: No deformity.      Cervical back: Neck supple.      Comments: Deconditioned    Skin:     General: Skin is warm and dry.   Neurological:      Mental Status: Mental status is at baseline.      Sensory: No sensory deficit.      Motor: Weakness present. No abnormal muscle tone.      Gait: Gait abnormal.   Psychiatric:         Mood and Affect: Mood normal.         Behavior: Behavior normal.               Diagnostic Results: Labs: Reviewed  ECG: Reviewed  CT: Reviewed

## 2025-02-12 NOTE — PT/OT/SLP PROGRESS
"Physical Therapy Treatment    Patient Name:  Jordin Allen Jr.   MRN:  [id number]    Recommendations:     Discharge Recommendations: High Intensity Therapy  Discharge Equipment Recommendations: walker, rolling  Barriers to discharge: None    Assessment:     Jordin Allen Jr. is a 79 y.o. male admitted with a medical diagnosis of Unspecified visual disturbance.  He presents with the following impairments/functional limitations: weakness, impaired endurance, impaired functional mobility, impaired cardiopulmonary response to activity Pt tolerated treatment session well today. Pt requiring little to no assistance for bed mobility, transfers and gait training. Pt was able to increase distance ambulated during today's session. Patient remains appropriate for continued skilled services within the acute environment and goals remain appropriate.   .    Rehab Prognosis: Good; patient would benefit from acute skilled PT services to address these deficits and reach maximum level of function.    Recent Surgery: * No surgery found *      Plan:     During this hospitalization, patient to be seen 4 x/week to address the identified rehab impairments via gait training, therapeutic activities, therapeutic exercises, neuromuscular re-education and progress toward the following goals:    Plan of Care Expires:  03/11/25    Subjective     Chief Complaint: None stated   Patient/Family Comments/goals: "My goal is to play golf again."  Pain/Comfort:  Pain Rating 1: 0/10      Objective:     Communicated with Rn prior to session.  Patient found supine with telemetry upon PT entry to room.     General Precautions: Standard, aspiration, fall  Orthopedic Precautions: N/A  Braces: N/A  Respiratory Status: Room air     Functional Mobility:  Bed Mobility:     Supine to Sit: stand by assistance  EOB sitting: Supervision   Sit to Supine: stand by assistance    Transfers:     Sit to Stand x 2:  stand by assistance with no AD  Gait: 164 ft CGA with " no AD   Pt ambulated with decreased mamadou, step length, and mild instability, no LOB noted     Pt performed 10 repetitions of seated B LE exercises consisting of: Marching, LAQ, ABD/ADD, heel raises, and toe raises.         AM-PAC 6 CLICK MOBILITY  Turning over in bed (including adjusting bedclothes, sheets and blankets)?: 4  Sitting down on and standing up from a chair with arms (e.g., wheelchair, bedside commode, etc.): 3  Moving from lying on back to sitting on the side of the bed?: 4  Moving to and from a bed to a chair (including a wheelchair)?: 3  Need to walk in hospital room?: 3  Climbing 3-5 steps with a railing?: 3  Basic Mobility Total Score: 20       Treatment & Education:  Therapist provided instruction and educated for safety during transfers and gait training. As well as proper body mechanics, energy conservation, and fall prevention strategies during tasks listed above, and the effects of prolonged immobility and the importance of performing EOB/OOB activity and exercises to promote healing and reduce recovery time.       Patient left supine with all lines intact, call button in reach, and Rn notified..    GOALS:   Multidisciplinary Problems       Physical Therapy Goals          Problem: Physical Therapy    Goal Priority Disciplines Outcome Interventions   Physical Therapy Goal     PT, PT/OT Progressing    Description: Goals to be met by: 25     Patient will increase functional independence with mobility by performin. Supine to sit with Stand-by Assistance  2. Sit to supine with Stand-by Assistance  3. Sit to stand transfer with Stand-by Assistance  4. Bed to chair transfer with Stand-by Assistance using Rolling Walker  5. Gait  x 50 feet with Stand-by Assistance using Rolling Walker.   6. Ascend/Descend 4 inch curb step with Stand-by Assistance using Rolling Walker.  7. Lower extremity exercise program x15 reps per handout, with assistance as needed                         DME  Justifications:   Jordin's mobility limitation cannot be sufficiently resolved by the use of a cane. His functional mobility deficit can be sufficiently resolved with the use of a Rolling Walker. Patient's mobility limitation significantly impairs their ability to participate in one of more activities of daily living.  The use of a RW will significantly improve the patient's ability to participate in MRADLS and the patient will use it on regular basis in the home.    Time Tracking:     PT Received On: 02/12/25  PT Start Time: 1257     PT Stop Time: 1318  PT Total Time (min): 21 min     Billable Minutes: Gait Training 15    Treatment Type: Treatment  PT/PTA: PTA     Number of PTA visits since last PT visit: 2     02/12/2025

## 2025-02-15 LAB
BACTERIA CSF CULT: NO GROWTH
GRAM STN SPEC: NORMAL

## 2025-02-18 ENCOUNTER — TELEPHONE (OUTPATIENT)
Dept: OPHTHALMOLOGY | Facility: CLINIC | Age: 80
End: 2025-02-18
Payer: MEDICARE

## 2025-02-18 DIAGNOSIS — H53.9 UNSPECIFIED VISUAL DISTURBANCE: Primary | ICD-10-CM

## 2025-02-18 DIAGNOSIS — H53.15 VISUAL DISTORTIONS OF SHAPE AND SIZE: ICD-10-CM

## 2025-02-19 ENCOUNTER — DOCUMENTATION ONLY (OUTPATIENT)
Dept: RADIATION ONCOLOGY | Facility: CLINIC | Age: 80
End: 2025-02-19
Payer: MEDICARE

## 2025-02-19 NOTE — PROGRESS NOTES
"Following up on in inpatient rehab status.  Pt says he's doing "good".  He says the results of his spinal tap are still outstanding so he's not sure about that.  He confirms that he is still taking the steroids.  I let him know that I would call him back Monday to get him scheduled to see mle for a steroid taper.  Says that he may get out Friday.      "

## 2025-02-23 ENCOUNTER — PATIENT MESSAGE (OUTPATIENT)
Dept: ADMINISTRATIVE | Facility: OTHER | Age: 80
End: 2025-02-23
Payer: MEDICARE

## 2025-02-24 ENCOUNTER — TELEPHONE (OUTPATIENT)
Dept: AUDIOLOGY | Facility: CLINIC | Age: 80
End: 2025-02-24
Payer: MEDICARE

## 2025-02-24 ENCOUNTER — TELEPHONE (OUTPATIENT)
Dept: RADIATION ONCOLOGY | Facility: CLINIC | Age: 80
End: 2025-02-24
Payer: MEDICARE

## 2025-02-24 ENCOUNTER — PATIENT MESSAGE (OUTPATIENT)
Dept: ADMINISTRATIVE | Facility: OTHER | Age: 80
End: 2025-02-24
Payer: MEDICARE

## 2025-02-24 NOTE — TELEPHONE ENCOUNTER
Called patient to assess status with hearing aids. He reported his left hearing aid is not working. I encouraged him to come to the hearing aid window to have Nani take a look at his hearing aid and take a look in his ears for wax due to history of cerumen impaction. She will schedule him if necessary.    Bebe Cerda, CCC-A

## 2025-02-25 ENCOUNTER — LAB VISIT (OUTPATIENT)
Dept: LAB | Facility: HOSPITAL | Age: 80
End: 2025-02-25
Attending: HOSPITALIST
Payer: MEDICARE

## 2025-02-25 ENCOUNTER — PATIENT MESSAGE (OUTPATIENT)
Dept: ADMINISTRATIVE | Facility: OTHER | Age: 80
End: 2025-02-25
Payer: MEDICARE

## 2025-02-25 DIAGNOSIS — C34.92 ADENOCARCINOMA, LUNG, LEFT: ICD-10-CM

## 2025-02-25 LAB
CREAT SERPL-MCNC: 0.9 MG/DL (ref 0.5–1.4)
EST. GFR  (NO RACE VARIABLE): >60 ML/MIN/1.73 M^2

## 2025-02-25 PROCEDURE — 36415 COLL VENOUS BLD VENIPUNCTURE: CPT | Performed by: HOSPITALIST

## 2025-02-25 PROCEDURE — 82565 ASSAY OF CREATININE: CPT | Performed by: HOSPITALIST

## 2025-02-26 ENCOUNTER — OFFICE VISIT (OUTPATIENT)
Dept: HEMATOLOGY/ONCOLOGY | Facility: CLINIC | Age: 80
End: 2025-02-26
Payer: MEDICARE

## 2025-02-26 ENCOUNTER — PATIENT MESSAGE (OUTPATIENT)
Dept: ADMINISTRATIVE | Facility: OTHER | Age: 80
End: 2025-02-26
Payer: MEDICARE

## 2025-02-26 ENCOUNTER — CLINICAL SUPPORT (OUTPATIENT)
Dept: OPHTHALMOLOGY | Facility: CLINIC | Age: 80
End: 2025-02-26
Payer: MEDICARE

## 2025-02-26 VITALS
OXYGEN SATURATION: 99 % | TEMPERATURE: 98 F | WEIGHT: 200.38 LBS | HEIGHT: 69 IN | HEART RATE: 72 BPM | SYSTOLIC BLOOD PRESSURE: 131 MMHG | RESPIRATION RATE: 18 BRPM | BODY MASS INDEX: 29.68 KG/M2 | DIASTOLIC BLOOD PRESSURE: 81 MMHG

## 2025-02-26 DIAGNOSIS — H53.9 UNSPECIFIED VISUAL DISTURBANCE: ICD-10-CM

## 2025-02-26 DIAGNOSIS — C79.31 SECONDARY MALIGNANT NEOPLASM OF BRAIN: ICD-10-CM

## 2025-02-26 DIAGNOSIS — C77.1 SECONDARY MALIGNANT NEOPLASM OF INTRATHORACIC LYMPH NODES: ICD-10-CM

## 2025-02-26 DIAGNOSIS — H53.15 VISUAL DISTORTIONS OF SHAPE AND SIZE: ICD-10-CM

## 2025-02-26 DIAGNOSIS — C34.92 ADENOCARCINOMA, LUNG, LEFT: Primary | ICD-10-CM

## 2025-02-26 DIAGNOSIS — J70.0 RADIATION PNEUMONITIS: ICD-10-CM

## 2025-02-26 PROCEDURE — 99215 OFFICE O/P EST HI 40 MIN: CPT | Mod: S$PBB,,, | Performed by: HOSPITALIST

## 2025-02-26 PROCEDURE — G2211 COMPLEX E/M VISIT ADD ON: HCPCS | Mod: S$PBB,,, | Performed by: HOSPITALIST

## 2025-02-26 PROCEDURE — 99215 OFFICE O/P EST HI 40 MIN: CPT | Mod: PBBFAC | Performed by: HOSPITALIST

## 2025-02-26 PROCEDURE — 99999 PR PBB SHADOW E&M-EST. PATIENT-LVL V: CPT | Mod: PBBFAC,,, | Performed by: HOSPITALIST

## 2025-02-26 NOTE — PROGRESS NOTES
I have seen the patient, reviewed the Fellow's progress note. I have personally interviewed and examined the patient at bedside and agree with the findings, with the following comments.     Jordin Allen Jr. is a 78 y.o. 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, initially dx'd 3/21/24 as stage IIIA disease, completed CRT (5/8/24-6/18/24) with weekly carboplatin/paclitaxel (5/9/24-6/18/24), currently on consolidation durvalumab (7/18/24 - 12/6/24; held 9/26/24-12/6/24 due to likely radiation pneumonitis, C3 on 12/6/24, held subsequently for pneumonitits), w/ thalamic met proven radiographically (7/26/24) s/p GK (27 Gy/3 Fx, 8/2/24-8/7/24), R temporal met proven radiographically (10/7/24) s/p GK (20 Gy/1Fx, 10/11/24), and R temporal lobe met proven radiographically (1/24/25) s/p GK (22 Gy/1 Fx, 1/31/25), currently on surveillance    Last clinic 1/3/25, hold further durvalumab given multiple episodes of pneumonitis, continue steroid taper, short interval CT C8 weeks from last noting new satellite lesion.  MRI brain in Rad Onc on 1/24/25.    Interval History:   1/24/25:  Radiation oncology follow up, interval progression of right temporal lobe met, plan for gamma knife radiosurgery to this area.  Otherwise, previously treated lesions improving  1/31/25: GKRS to R temporal lobe met  2/7/25:  Presented to ED after found face down on floor all attempting to go to bathroom, right-sided gaze deficit and worsening left-sided weakness.  CTA stroke without evidence of major territorial infarction.  MRI brain negative for acute stroke.  Recommended EEG to rule out seizures however unable to obtain.  Plan for outpatient EEG with neurology consult placed.  Patient  discharged.  2/7/25-2/12/25: Re-presented to ED after vision loss (left-sided visual field in both eyes) repeat CT stroke without evidence of acute infarct.  Patient on Decadron for vasogenic edema control.  Started memantine.  Unlikely symptoms are related to new metastasis.  Lumbar puncture without evidence of infection of the cytology and cultures pending at time of discharge.  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  2/10/25:  CSF negative for malignant cells  2/12/25-2/21/25:  Rehab      PLAN:   Studies  None  Imaging  Schedule CT C/A/P now, last on 12/3/25  Treatment  Surveillance  RTC   After CT C/A/P      Oncology History        Isacc Nuñez IV, MD  Hematology and Oncology  [facility] - Hem Onc 3rd Fl

## 2025-02-26 NOTE — PROGRESS NOTES
[facility] MEDICAL ONCOLOGY - FOLLOW UP VISIT    Reason for visit: Adenocarcinoma of the SANJAY      Oncology History   Adenocarcinoma, lung, left   3/19/2024 Imaging Significant Findings    CTA PE (hemoptysis)  - 3.4 x 5.8 cm L hilar mass, probable invasion of distal L main bronchus and SANJAY bronchi     3/19/2024 - 3/22/2024 Hospital Admission    Presented with new onset hemoptysis.  Imaging demonstrated left hilar mass.  Bronchoscopy performed and confirmed adenocarcinoma, EGFR Exon 20 insertion.     3/21/2024 Procedure    Bronch w/ EBUS  SANJAY, Endobronchial Bx  - Adenocarcinoma (CK7 and TTF1 positive; CK20, p40, and CK 5/6 negative)    SANJAY, BAL  - Adenocarcinoma    TEMPUS NGS/PD-L1  - PD-L1 TPS: 35%  - EGFR exon 20 inframe insertion ([id number])  - TP53 misssense ([id number])  - Negative: EGFR, ALK, BRAF, KRAS, ROS1, RET, MET, ERBB2  - TMB 9.5 m/MB     4/3/2024 Imaging Significant Findings    MRI Brain  - 0.8 cm rim enhancing lesion in L thalamus, no significant mass effect      4/11/2024 Imaging Significant Findings    PET CT  - 4.8 x 2.3 cm  L hilar mass, SUV 14  - Adjacent pulmonary nodules, e.g. 1.2 cm  - No intrathoracic LAD     4/12/2024 Cancer Staged    Staging form: Lung, AJCC 8th Edition  - Clinical stage from 4/12/2024: Stage STEF (cT3, cN1, cM1b)     4/30/2024 Procedure    Bronch with EBUS  RUL, EBBx  - Adenocarcinoma (TTF-1 positive, p40 negative)    LN  - Adenocarcinoma    Procedure Note  - Station for L was normal-sized not sampled; no other lymph nodes were visible by EBUS, including station 7, 4R, and 11R     5/8/2024 - 6/18/2024 Radiation Therapy    Treatment Summary  Course: C1 Lung 2024  Treatment Site Energy Dose/Fx (Gy) #Fx Total Dose (Gy) Start Date End Date Elapsed Days   Left hilum/lung 6X 2 30 / 30 60 5/8/2024 6/18/2024 41        5/9/2024 - 6/18/2024 Chemotherapy    Carboplatin/Paclitaxel, Concurrent w/ Radiation Therapy  Weekly  - Carboplatin AUC 2  -  Paclitaxel 45 mg/m2     6/27/2024 Imaging Significant Findings    CT C/A/P  - 3.2 x 3.1 cm left hilar mass, previously 3.7 cm  - Previously seen 1.2 cm nodule adjacent to primary mass not seen on this exam  - Near complete resolution of multiple prior nodular opacities  - Stable RUL micronodule     7/18/2024 -  Chemotherapy    Treatment Summary   Plan Name: OP DURVALUMAB 1500 MG Q4W  Treatment Goal: Curative  Status: Active  Start Date: 7/18/2024  End Date: 9/12/2025 (Planned)  Provider: Isacc Nuñez IV, MD  Chemotherapy: [No matching medication found in this treatment plan]     7/26/2024 Imaging Significant Findings    MRI Brain  - 2.2 cm peripherally enhancing cystic lesion of left thalamus, previously 0.8 cm.  Smaller surrounding edema signal, localized mass effect with slight effacement of the adjacent lateral ventricle, no hydrocephalus or midline shift.     8/2/2024 - 8/7/2024 Radiation Therapy    Treating physician: Bienvenido Henson  GK SRT  Target # Site Dose per Fraction (Gy) Cumulative Dose % Isodose Line Fraction # Total Fractions   1 Lt Thalamic 9 27 51 3 3          9/18/2024 Imaging Significant Findings    PET CT  4.9 x 2.4 cm left hilar mass (previously 4.8 x 2.3 cm), SUV 7.3 (previously 14)  0.5 cm satellite pulmonary SANJAY nodule, previously 1.2 cm  1 0.0 x 0.9 cm new satellite hypermetabolic pulmonary nodule in the anterior segment of SANJAY, SUV 9.4, new, radiation change vs progression  Development of patchy solid and ground-glass opacities with interlobular wall thickening in posterior segment of SANJAY, radiation change vs progression     10/7/2024 Imaging Significant Findings    MRI Brain  1.9 x 1.7 cm right cerebellar hemisphere irregular enhancing lesion, new from prior.  Moderate surrounding vasogenic edema spanning 5 cm  1.5 x 1.0 cm dorsal left thalamic lesion, decreased from 2.2 x 1.7 cm     10/11/2024 - 10/11/2024 Radiation Therapy    Treating physician: Bienvenido Henson  Gamma Knife  SRS  Target # Site Dose per Fraction (Gy) Cumulative Dose % Isodose Line Total Fractions   1 C2 Rt Temporal 20 20 56 1        10/15/2024 Imaging Significant Findings    CT C, Non-Con  Interval worsening of left-sided pleural pulmonary process likely representing radiation pneumonitis   Interval development of soft tissue density nodules in both lungs of unclear etiology (new 0.6 cm lesion in RUL; new 1.2 cm lesion in LLL)     12/3/2024 Imaging Significant Findings    CT C  2.2 x 1.1 spiculated nodule in central LLL, new compared to 10/15/24, given location may represent radiation change of recurrent malignancy is not excluded  1.3 x 0.5 cm SANJAY nodule with concave margins unchanged  Interval resolution of irregular nodules in right upper and left lower lobes compared to 10/15/24     1/31/2025 - 1/31/2025 Radiation Therapy    Treating physician: Bienvenido Henson  Gamma Knife SRS  Target # Site Dose per Fraction (Gy) Cumulative Dose % Isodose Line Total Fractions   1 C3 Rt Temporal 22 22 80 1        2/7/2025 - 2/12/2025 Hospital Admission    Re-presented to ED after vision loss (left-sided visual field in both eyes) repeat CT stroke without evidence of acute infarct.  Patient on Decadron for vasogenic edema control.  Started memantine.  Unlikely symptoms are related to new metastasis.  Lumbar puncture without evidence of infection of the cytology and cultures pending at time of discharge.  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, initially dx'd 3/21/24 as  stage IIIA disease, completed CRT (5/8/24-6/18/24) with weekly carboplatin/paclitaxel (5/9/24-6/18/24), currently on consolidation durvalumab (7/18/24 - 12/6/24; held 9/26/24-12/6/24 due to likely radiation pneumonitis, C3 on 12/6/24, held subsequently for pneumonitits), w/ thalamic met proven radiographically (7/26/24) s/p GK (27 Gy/3 Fx, 8/2/24-8/7/24), R temporal met proven radiographically (10/7/24) s/p GK (20 Gy/1Fx, 10/11/24), and R temporal lobe met proven radiographically (1/24/25) s/p GK (22 Gy/1 Fx, 1/31/25), currently on surveillance     Last clinic 1/3/25, hold further durvalumab given multiple episodes of pneumonitis, continue steroid taper, short interval CT C8 weeks from last noting new satellite lesion.  MRI brain in Rad Onc on 1/24/25.     Interval History:   1/24/25:  Radiation oncology follow up, interval progression of right temporal lobe met, plan for gamma knife radiosurgery to this area.  Otherwise, previously treated lesions improving  1/31/25: GKRS to R temporal lobe met  2/7/25:  Presented to ED after found face down on floor all attempting to go to bathroom, right-sided gaze deficit and worsening left-sided weakness.  CTA stroke without evidence of major territorial infarction.  MRI brain negative for acute stroke.  Recommended EEG to rule out seizures however unable to obtain.  Plan for outpatient EEG with neurology consult placed.  Patient discharged.  2/7/25-2/12/25: Re-presented to ED after vision loss (left-sided visual field in both eyes) repeat CT stroke without evidence of acute infarct.  Patient on Decadron for vasogenic edema control.  Started memantine and keppra (seizure prophylaxis).  Unlikely symptoms are related to new metastasis.  Lumbar puncture without evidence of infection of the cytology and cultures pending at time of discharge.  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  2/10/25:  CSF negative for malignant  "cells  2/12/25-2/21/25:  Rehab     Patient is doing very well at this time. After his recent hospitalization (details above) he was sent to an inpatient rehab which greatly improved his functional status. At this time he is able to get around with little to no SOB. He is also off oxygen at this time. Home health is going to be setup this week. He is currently on a steroid taper which is going to finish next week. His appetite has been good. He still has abnormal left eye peripheral vision and is going to see a neuro ophthalmologist.       ROS:   As per HPI.     Physical Exam:     /81 (BP Location: Right arm, Patient Position: Sitting)   Pulse 72   Temp 97.8 °F (36.6 °C) (Oral)   Resp 18   Ht 5' 9" (1.753 m)   Wt 90.9 kg (200 lb 6.4 oz)   SpO2 99%   BMI 29.59 kg/m²                Physical Exam  Constitutional:       Appearance: Normal appearance.   HENT:      Head: Normocephalic and atraumatic.   Eyes:      Extraocular Movements: Extraocular movements intact.      Conjunctiva/sclera: Conjunctivae normal.      Pupils: Pupils are equal, round, and reactive to light.   Cardiovascular:      Rate and Rhythm: Normal rate and regular rhythm.      Heart sounds: No murmur heard.     No friction rub. No gallop.   Pulmonary:      Effort: Pulmonary effort is normal.      Breath sounds: No wheezing, rhonchi or rales.   Musculoskeletal:         General: Normal range of motion.      Right lower leg: No edema.      Left lower leg: No edema.   Skin:     General: Skin is warm and dry.   Neurological:      Mental Status: He is alert and oriented to person, place, and time.   Psychiatric:         Mood and Affect: Mood normal.         Thought Content: Thought content normal.         Judgment: Judgment normal.           Labs:   Recent Results (from the past 48 hours)   Creatinine, Serum    Collection Time: 02/25/25 10:33 AM   Result Value Ref Range    Creatinine 0.9 0.5 - 1.4 mg/dL    eGFR >60.0 >60 mL/min/1.73 m^2 "             Imaging:    See oncologic history above.     Path:  See oncologic history above.      Assessment and Plan:     Jordin Allen Jr. is a 78 y.o. 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, initially dx'd 3/21/24 as stage IIIA disease, completed CRT (5/8/24-6/18/24) with weekly carboplatin/paclitaxel (5/9/24-6/18/24), currently on consolidation durvalumab (7/18/24 - 12/6/24; held 9/26/24-12/6/24 due to likely radiation pneumonitis, C3 on 12/6/24, held subsequently for pneumonitits), w/ thalamic met proven radiographically (7/26/24) s/p GK (27 Gy/3 Fx, 8/2/24-8/7/24), R temporal met proven radiographically (10/7/24) s/p GK (20 Gy/1Fx, 10/11/24), and R temporal lobe met proven radiographically (1/24/25) s/p GK (22 Gy/1 Fx, 1/31/25), currently on surveillance    Stage IIIA Adenocarcinoma of L Hilum, EGFR Exon 20 insertion, PD-L1 35%  Radiation Pneumonitis  ECOG PS 0-1. Patient received 2 cycles of consolidative durvalumab. Given he had two episodes of pneumonitis (admitted on 12/27/24 during his second episode) the durvalumab has been discontinued indefinitely. He was recently admitted with abnormal neurological symptoms and left eye vision changes. Extensive workup was completed with no clear etiology for his symptoms. MRI and CT negative for new metastatic lesions, stroke, or bleed. He had a LP which was negative for infection or malignant cells. 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 He was also seen by neurology who recommended prophylactic keppra for now. At this time there is no evidence for progression of his lung cancer so will continue with surveillance and close monitoring.      PLAN:   Durvalumab is discontinued indefinitely given his second episode of pneumonitis   CT CAP ordered today  On steroid taper, will finish on 3/4/25  Follow-up in 3-months with imaging prior or sooner if CT is abnormal   Patient had an eye exam earlier today and is going to have an appointment with neuro ophthalmology      Brain Met  0.8 cm rim enhancing lesion in L thalamus on initial imaging. On initial review with radiology, low likelihood of malignancy and instead more likely cystic lesion, so pt treated w/ curative intent therapy for intrathoracic disease with plan for surveillance. Unfortunately, repeat MRI brain on 7/26/24 demonstrated growth to 2.2 cm. Pt now s/p GK to this solitary lesion, continuing to treat as oligometastatic disease as above. Repeat MRI brain (10/7/24) demonstrated a new right cerebellar hemisphere hilar irregular enhancing lesion, for which patient underwent SRS on 10/11/24. R temporal lobe met proven radiographically (1/24/25) s/p GK (22 Gy/1 Fx, 1/31/25). Recently admitted for abnormal neurological symptoms, MRI on 2/8/25 with no new brain lesions.     Follow-up with neurosurgery and rad onc on 4/29/25      Decreased Appetite  Nausea  Pt previously endorsed a significantly decreased appetite. He had lost about 20 lbs down from his pre-treatment weight. Likely multifactorial, including recent CRT, GK to CNS met, and diarrheal illness. Currently on mirtazapine to minimal effect. Declines f/u with onc nutrition, stating that he has heard the recommendations before. Attempted dronabinol, however unable to procure this due to shortage and so instead started on Periactin. Nausea of unclear etiology, perhaps related to CNS disease though less likely s/p treatment. Significantly improved s/p course of steroids for pneumonitis, since off appetite stimulants.   Follows with palliative care       Fatigue  Likely multifactorial, including completion of CRT, GK to CNS met, diarrheal  illness, and decreased PO intake. Significantly improved after course of steroids for pneumonitis.   Continue to follow with palliative care      The above information has been reviewed with the patient and all questions have been answered to their apparent satisfaction.  They understand that they can call the clinic with any questions.    Tom Bhat MD  Hematology/Oncology Fellow PGY-V        Route Chart for Scheduling    Med Onc Chart Routing      Follow up with physician 3 months.   Follow up with JARAD    Infusion scheduling note    Injection scheduling note    Labs    Imaging CT chest abdomen pelvis      Pharmacy appointment    Other referrals

## 2025-02-27 ENCOUNTER — TELEPHONE (OUTPATIENT)
Dept: OPHTHALMOLOGY | Facility: CLINIC | Age: 80
End: 2025-02-27
Payer: MEDICARE

## 2025-02-27 ENCOUNTER — PATIENT MESSAGE (OUTPATIENT)
Dept: ADMINISTRATIVE | Facility: OTHER | Age: 80
End: 2025-02-27
Payer: MEDICARE

## 2025-02-27 NOTE — TELEPHONE ENCOUNTER
----- Message from Cara Looney MD sent at 2/27/2025  9:27 AM CST -----  Regarding: RE: Scheduling Request  Contact: :Jordin Allen Jr.  Back with us with repeat testing in 3-4 months. There were visual field defects consistent with known brain disease. RC  ----- Message -----  From: Shahid Verdin  Sent: 2/27/2025   9:11 AM CST  To: Cara Looney MD  Subject: FW: Scheduling Request                           Review testing and when to f/u?  ----- Message -----  From: Radha Berry  Sent: 2/27/2025   8:46 AM CST  To: Aayush Duval  Subject: Scheduling Request                               Scheduling Request   Appt Type:  EP Date/Time Preference:any Treating Provider:Aayush Caller Name:Jordin Allen Jr.  Contact Preference:[phone]  Comments/notes:Patient is calling to schedule F/U from Test. Requesting a call back

## 2025-02-27 NOTE — TELEPHONE ENCOUNTER
Left message stating I was returning phone call.     *Back with us with repeat testing in 3-4 months. There were visual field defects consistent with known brain disease.

## 2025-02-28 ENCOUNTER — TELEPHONE (OUTPATIENT)
Dept: HEMATOLOGY/ONCOLOGY | Facility: CLINIC | Age: 80
End: 2025-02-28
Payer: MEDICARE

## 2025-02-28 ENCOUNTER — PATIENT MESSAGE (OUTPATIENT)
Dept: ADMINISTRATIVE | Facility: OTHER | Age: 80
End: 2025-02-28
Payer: MEDICARE

## 2025-02-28 ENCOUNTER — EXTERNAL CHRONIC CARE MANAGEMENT (OUTPATIENT)
Dept: PRIMARY CARE CLINIC | Facility: CLINIC | Age: 80
End: 2025-02-28
Payer: MEDICARE

## 2025-02-28 PROCEDURE — 99490 CHRNC CARE MGMT STAFF 1ST 20: CPT | Mod: S$PBB,,, | Performed by: INTERNAL MEDICINE

## 2025-02-28 PROCEDURE — 99490 CHRNC CARE MGMT STAFF 1ST 20: CPT | Mod: PBBFAC,PO | Performed by: INTERNAL MEDICINE

## 2025-02-28 NOTE — TELEPHONE ENCOUNTER
----- Message from Jaimee sent at 2/28/2025  2:55 PM CST -----  Regarding: Patient advice  Contact: Tonja [phone]  Name of Caller:  Adalid Nielsen nurse with BCBSContact Preference: [phone] of Call: Member is enrolled in assistance program

## 2025-03-01 ENCOUNTER — PATIENT MESSAGE (OUTPATIENT)
Dept: ADMINISTRATIVE | Facility: OTHER | Age: 80
End: 2025-03-01
Payer: MEDICARE

## 2025-03-02 ENCOUNTER — PATIENT MESSAGE (OUTPATIENT)
Dept: ADMINISTRATIVE | Facility: OTHER | Age: 80
End: 2025-03-02
Payer: MEDICARE

## 2025-03-03 ENCOUNTER — PATIENT MESSAGE (OUTPATIENT)
Dept: ADMINISTRATIVE | Facility: OTHER | Age: 80
End: 2025-03-03
Payer: MEDICARE

## 2025-03-04 ENCOUNTER — PATIENT MESSAGE (OUTPATIENT)
Dept: ADMINISTRATIVE | Facility: OTHER | Age: 80
End: 2025-03-04
Payer: MEDICARE

## 2025-03-05 ENCOUNTER — PATIENT MESSAGE (OUTPATIENT)
Dept: ADMINISTRATIVE | Facility: OTHER | Age: 80
End: 2025-03-05
Payer: MEDICARE

## 2025-03-05 ENCOUNTER — PATIENT MESSAGE (OUTPATIENT)
Dept: OPHTHALMOLOGY | Facility: CLINIC | Age: 80
End: 2025-03-05
Payer: MEDICARE

## 2025-03-05 ENCOUNTER — HOSPITAL ENCOUNTER (OUTPATIENT)
Dept: RADIOLOGY | Facility: HOSPITAL | Age: 80
Discharge: HOME | End: 2025-03-05
Attending: STUDENT IN AN ORGANIZED HEALTH CARE EDUCATION/TRAINING PROGRAM
Payer: MEDICARE

## 2025-03-05 DIAGNOSIS — C34.92 ADENOCARCINOMA, LUNG, LEFT: ICD-10-CM

## 2025-03-05 PROCEDURE — 74177 CT ABD & PELVIS W/CONTRAST: CPT | Mod: 26,,, | Performed by: RADIOLOGY

## 2025-03-05 PROCEDURE — 71260 CT THORAX DX C+: CPT | Mod: 26,,, | Performed by: RADIOLOGY

## 2025-03-05 PROCEDURE — 74177 CT ABD & PELVIS W/CONTRAST: CPT | Mod: TC

## 2025-03-05 PROCEDURE — 25500020 PHARM REV CODE 255: Performed by: STUDENT IN AN ORGANIZED HEALTH CARE EDUCATION/TRAINING PROGRAM

## 2025-03-05 RX ADMIN — IOHEXOL 100 ML: 350 INJECTION, SOLUTION INTRAVENOUS at 01:03

## 2025-03-05 NOTE — PROGRESS NOTES
"Subjective:    The following note was written in combination with deep-scribe software and dictation (to which understanding and consent was verbally expressed); please excuse any transcription and/or dictation errors.      Chief Complaint: Follow-up (hosp) and Leg Swelling (Lower legs and ankles)    Transitional Care Note    Family and/or Caretaker present at visit?  No.  Diagnostic tests reviewed/disposition: No diagnosic tests pending after this hospitalization.  Disease/illness education:  Lung metastatic adenocarcinoma  Home health/community services discussion/referrals: Patient has home health established at [facility] .   Establishment or re-establishment of referral orders for community resources: No other necessary community resources.   Discussion with other health care providers: No discussion with other health care providers necessary.     Medication reconciliation completed    HPI  Mr.William Tiffany Erwin is an exceptionally nice 79-year-old man with recently diagnosed left hilar lung adenocarcinoma (with brain metastases) in setting of centrilobular emphysema/COPD with secondary chronic hypoxic respiratory failure (following with pulmonology), coronary artery disease/aortic atherosclerosis/hyperlipidemia (atorvastatin 80), hypertension (hydrochlorothiazide 25 and losartan 100), distant albeit significant smoking history (quit 12 years ago) GERD (omeprazole 20), and obstructive sleep apnea (using home CPAP) presenting for hospital follow-up:      Hospital follow-up:   -originally presented to [facility] main status post fall and sudden onset vision loss in his left visual field after being discharged earlier that day for TIA evaluation (found face down with the right eye gaze deficiency and worsening left-sided weakness).  MRI brain negative for acute stroke; recommended outpatient EEG and discharged on Decadron  -CTA Stroke with no evidence of new vascular abnormalities  - MRI Brain with "increased T2 Flair " in the subarachnoid space overlying the posterior right temporal and right occipital lobes in the setting of known metastatic lesion   - Neurology and ophthalmology consulted for vision loss w/u.   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   HENT: Negative.     Respiratory:  Negative for cough, chest tightness and shortness of breath.    Cardiovascular:  Negative for chest pain, palpitations and leg swelling.   Gastrointestinal:  Negative for abdominal distention, abdominal pain, blood in stool, constipation, diarrhea, nausea and vomiting.   Endocrine: Negative.    Genitourinary:  Negative for difficulty urinating, dysuria, frequency and hematuria.   Musculoskeletal: Negative.    Integumentary:  Negative.   Neurological: Negative.    Psychiatric/Behavioral: Negative.           Objective:      Vitals:    03/06/25 1118   BP: 110/82   Pulse: 93   Resp: 18   Temp: 97.1 °F (36.2 °C)      Physical Exam  Vitals reviewed.   Constitutional:       General: He is not in acute distress.     Appearance: Normal appearance.   HENT:      Head: Normocephalic and atraumatic.      Comments: Facial features are symmetric      Nose: Nose  normal. No congestion or rhinorrhea.      Mouth/Throat:      Mouth: Mucous membranes are moist.      Pharynx: Oropharynx is clear. No oropharyngeal exudate or posterior oropharyngeal erythema.   Eyes:      General: No scleral icterus.     Extraocular Movements: Extraocular movements intact.      Conjunctiva/sclera: Conjunctivae normal.   Cardiovascular:      Rate and Rhythm: Normal rate and regular rhythm.      Pulses: Normal pulses.      Heart sounds: Normal heart sounds.   Pulmonary:      Effort: Pulmonary effort is normal. No respiratory distress.      Breath sounds: Normal breath sounds.   Musculoskeletal:         General: No deformity or signs of injury. Normal range of motion.      Cervical back: Normal range of motion.   Skin:     General: Skin is warm and dry.      Findings: No rash.   Neurological:      General: No focal deficit present.      Mental Status: He is alert and oriented to person, place, and time. Mental status is at baseline.   Psychiatric:         Mood and Affect: Mood normal.         Behavior: Behavior normal.         Thought Content: Thought content normal.       Medications Ordered Prior to Encounter[1]      Assessment:       1. Hospital discharge follow-up        Plan:       Hospital discharge follow-up  - doing extremely well since time of hospital discharge  - all needed follow-up appointments are scheduled/upcoming  - decision regarding medical clearance (vs disallowance) is difficult.  I advised against it for the time being pending EEG results and discussing this with surgical/radiation Oncology for their opinion regarding rate of metastasis, growth/lack thereof of cerebral lesions, and potential effect of cerebral radiation; recommendations and interventions appreciated                     [1]   Current Outpatient Medications on File Prior to Visit   Medication Sig Dispense Refill    albuterol (ACCUNEB) 0.63 mg/3 mL Nebu Inhale 3 mLs (0.63 mg total) by nebulization every 6 (six)  hours as needed (if symptoms failed to improve with inhaler). Rescue 375 mL 11    albuterol (PROVENTIL/VENTOLIN HFA) 90 mcg/actuation inhaler Inhale 2 puffs into the lungs every 4 (four) hours as needed for Wheezing. 8.5 g 11    amitriptyline (ELAVIL) 25 MG tablet Take 1 tablet (25 mg total) by mouth once daily. 90 tablet 3    aspirin (ECOTRIN) 81 MG EC tablet Take 81 mg by mouth once daily.       atorvastatin (LIPITOR) 80 MG tablet Take 1 tablet (80 mg total) by mouth in the morning. 30 tablet 0    BETA-CAROTENE,A, W-C & E/MIN (OCUVITE ORAL) Take 1 capsule by mouth once daily.       budesonide-glycopyr-formoterol (BREZTRI AEROSPHERE) 160-9-4.8 mcg/actuation HFAA Inhale 2 puffs into the lungs 2 (two) times a day. 10.7 g 3    echinacea 400 mg Cap Take 1 capsule by mouth once daily.       fluticasone propionate (FLONASE) 50 mcg/actuation nasal spray Spray 2 sprays (100 mcg total) in Each Nostril once daily. 16 g 11    furosemide (LASIX) 20 MG tablet Take 1 tablet (20 mg total) by mouth in the morning for 30 days. 30 tablet 0    latanoprost 0.005 % ophthalmic solution Instill 1 drop into both eyes every night at bedtime 7.5 mL 4    levETIRAcetam (KEPPRA) 500 MG Tab Take 1 tablet (500 mg total) by mouth 2 (two) times daily.      losartan (COZAAR) 100 MG tablet Take 1 tablet (100 mg total) by mouth once daily. 90 tablet 3    memantine (NAMENDA) 5 MG Tab Take 1 tablet (5 mg total) by mouth 2 (two) times daily.      nitroGLYCERIN (NITROSTAT) 0.4 MG SL tablet Place 1 tablet (0.4 mg total) under the tongue every 5 (five) minutes as needed. 100 tablet 3    omeprazole (PRILOSEC) 20 MG capsule Take 1 capsule (20 mg total) by mouth once daily. 90 capsule 3    roflumilast (DALIRESP) 500 mcg Tab Take 1 tablet (500 mcg total) by mouth once daily. 90 tablet 3    senna (SENOKOT) 8.6 mg tablet Take 1 tablet by mouth once daily. 60 tablet 2    traZODone (DESYREL) 50 MG tablet Take 1 tablet (50 mg total) by mouth every evening. 30  tablet 5    umeclidinium (INCRUSE ELLIPTA) 62.5 mcg/actuation inhalation capsule Inhale 1 puff (62.5 mcg total)  in the morning for 30 days. 90 each 0    dexAMETHasone (DECADRON) 4 MG Tab Take 1 tablet (4 mg total) by mouth 2 (two) times daily. 8 tablet 0    [DISCONTINUED] albuterol (PROVENTIL/VENTOLIN HFA) 90 mcg/actuation inhaler Inhale 2 puffs into the lungs every 4 hours as needed for wheezing for up to 30 days. 18 g 0    [DISCONTINUED] amitriptyline (ELAVIL) 25 MG tablet Take 1 tablet (25 mg total) by mouth nightly for 30 days. 30 tablet 0    [DISCONTINUED] budesonide-glycopyr-formoterol (BREZTRI AEROSPHERE) 160-9-4.8 mcg/actuation HFAA Inhale 2 puffs into the lungs 2 (two) times a day for 30 days. 10.7 g 0    [DISCONTINUED] dexAMETHasone (DECADRON) 2 MG tablet Take 1 tablet (2 mg total) by mouth 3 (three) times a day for 3 days, THEN 1 tablet (2 mg total) every 12 (twelve) hours for 3 days, THEN 1 tablet (2 mg total) daily for 5 days. 20 tablet 0    [DISCONTINUED] latanoprost 0.005 % ophthalmic solution Administer 1 drop into both eyes nightly for 30 days. 7.5 mL 0    [DISCONTINUED] levETIRAcetam (KEPPRA) 500 MG Tab Take 1 tablet (500 mg total) by mouth in the morning and 1 tablet (500 mg total) before bedtime. Do all this for 30 days. 60 tablet 0    [DISCONTINUED] losartan (COZAAR) 100 MG tablet Take 1 tablet (100 mg total) by mouth in the morning for 30 days. 30 tablet 0    [DISCONTINUED] memantine (NAMENDA) 5 MG Tab Take 1 tablet (5 mg total) by mouth in the morning and 1 tablet (5 mg total) before bedtime. 60 tablet 0    [DISCONTINUED] nitroGLYCERIN (NITROSTAT) 0.4 MG SL tablet Place 1 tablet under the tongue every 5 minutes as needed for chest pain 100 tablet 0    [DISCONTINUED] pantoprazole (PROTONIX) 40 MG tablet Take 1 tablet (40 mg total) by mouth in the morning for 30 days. 30 tablet 0    [DISCONTINUED] traZODone (DESYREL) 50 MG tablet Take 1 tablet (50 mg total) by mouth nightly for 30 days. 30  tablet 0     Current Facility-Administered Medications on File Prior to Visit   Medication Dose Route Frequency Provider Last Rate Last Admin    dextrose 50% injection 12.5 g  12.5 g Intravenous PRN Lidia Deleon MD        dextrose 50% injection 25 g  25 g Intravenous PRN Lidia Deleon MD        insulin regular injection 0-8 Units  0-8 Units Intravenous Continuous PRN Lidia Deleon MD        [COMPLETED] iohexoL (OMNIPAQUE 350) injection 100 mL  100 mL Intravenous ONCE PRN Tom Bhat MD   100 mL at 03/05/25 1356    [DISCONTINUED] GENERIC EXTERNAL MEDICATION     Provider, Generic External Data

## 2025-03-06 ENCOUNTER — OFFICE VISIT (OUTPATIENT)
Dept: INTERNAL MEDICINE | Facility: CLINIC | Age: 80
End: 2025-03-06
Payer: MEDICARE

## 2025-03-06 ENCOUNTER — TELEPHONE (OUTPATIENT)
Dept: OPHTHALMOLOGY | Facility: CLINIC | Age: 80
End: 2025-03-06
Payer: MEDICARE

## 2025-03-06 ENCOUNTER — PATIENT MESSAGE (OUTPATIENT)
Dept: ONCOLOGY | Facility: HOSPITAL | Age: 80
End: 2025-03-06
Payer: MEDICARE

## 2025-03-06 ENCOUNTER — PATIENT MESSAGE (OUTPATIENT)
Dept: ADMINISTRATIVE | Facility: OTHER | Age: 80
End: 2025-03-06
Payer: MEDICARE

## 2025-03-06 VITALS
HEIGHT: 69 IN | BODY MASS INDEX: 30.08 KG/M2 | WEIGHT: 203.06 LBS | RESPIRATION RATE: 18 BRPM | TEMPERATURE: 97 F | OXYGEN SATURATION: 100 % | DIASTOLIC BLOOD PRESSURE: 82 MMHG | SYSTOLIC BLOOD PRESSURE: 110 MMHG | HEART RATE: 93 BPM

## 2025-03-06 DIAGNOSIS — Z09 HOSPITAL DISCHARGE FOLLOW-UP: Primary | ICD-10-CM

## 2025-03-06 PROCEDURE — 99999 PR PBB SHADOW E&M-EST. PATIENT-LVL IV: CPT | Mod: PBBFAC,,, | Performed by: STUDENT IN AN ORGANIZED HEALTH CARE EDUCATION/TRAINING PROGRAM

## 2025-03-06 PROCEDURE — 99214 OFFICE O/P EST MOD 30 MIN: CPT | Mod: PBBFAC,PO | Performed by: STUDENT IN AN ORGANIZED HEALTH CARE EDUCATION/TRAINING PROGRAM

## 2025-03-06 NOTE — TELEPHONE ENCOUNTER
----- Message from Meghna Jones sent at 2/28/2025  5:00 PM CST -----  Contact: pt @ [phone]    ----- Message -----  From: Mayte Harris  Sent: 2/28/2025  12:02 PM CST  To: Aayush LAGUERRE Staff    Jordin Allen calling regarding Patient Advice (message) for #pt is calling to speak nurse concerning HVF test, asking for call back

## 2025-03-07 ENCOUNTER — PATIENT MESSAGE (OUTPATIENT)
Dept: ADMINISTRATIVE | Facility: OTHER | Age: 80
End: 2025-03-07
Payer: MEDICARE

## 2025-03-08 ENCOUNTER — PATIENT MESSAGE (OUTPATIENT)
Dept: ADMINISTRATIVE | Facility: OTHER | Age: 80
End: 2025-03-08
Payer: MEDICARE

## 2025-03-09 ENCOUNTER — RESULTS FOLLOW-UP (OUTPATIENT)
Dept: HEMATOLOGY/ONCOLOGY | Facility: CLINIC | Age: 80
End: 2025-03-09

## 2025-03-09 ENCOUNTER — PATIENT MESSAGE (OUTPATIENT)
Dept: ADMINISTRATIVE | Facility: OTHER | Age: 80
End: 2025-03-09
Payer: MEDICARE

## 2025-03-10 ENCOUNTER — PATIENT MESSAGE (OUTPATIENT)
Dept: ADMINISTRATIVE | Facility: OTHER | Age: 80
End: 2025-03-10
Payer: MEDICARE

## 2025-03-12 ENCOUNTER — PATIENT MESSAGE (OUTPATIENT)
Dept: ADMINISTRATIVE | Facility: OTHER | Age: 80
End: 2025-03-12
Payer: MEDICARE

## 2025-03-13 ENCOUNTER — PATIENT MESSAGE (OUTPATIENT)
Dept: ADMINISTRATIVE | Facility: OTHER | Age: 80
End: 2025-03-13
Payer: MEDICARE

## 2025-03-14 ENCOUNTER — OFFICE VISIT (OUTPATIENT)
Dept: HEMATOLOGY/ONCOLOGY | Facility: CLINIC | Age: 80
End: 2025-03-14
Payer: MEDICARE

## 2025-03-14 ENCOUNTER — PATIENT MESSAGE (OUTPATIENT)
Dept: ADMINISTRATIVE | Facility: OTHER | Age: 80
End: 2025-03-14
Payer: MEDICARE

## 2025-03-14 VITALS
OXYGEN SATURATION: 98 % | WEIGHT: 196.63 LBS | DIASTOLIC BLOOD PRESSURE: 80 MMHG | BODY MASS INDEX: 29.12 KG/M2 | HEART RATE: 75 BPM | HEIGHT: 69 IN | SYSTOLIC BLOOD PRESSURE: 126 MMHG

## 2025-03-14 DIAGNOSIS — C77.1 SECONDARY MALIGNANT NEOPLASM OF INTRATHORACIC LYMPH NODES: ICD-10-CM

## 2025-03-14 DIAGNOSIS — C34.92 ADENOCARCINOMA, LUNG, LEFT: Primary | ICD-10-CM

## 2025-03-14 DIAGNOSIS — R53.83 FATIGUE, UNSPECIFIED TYPE: ICD-10-CM

## 2025-03-14 DIAGNOSIS — L03.115 CELLULITIS OF RIGHT LEG: ICD-10-CM

## 2025-03-14 DIAGNOSIS — C79.31 SECONDARY MALIGNANT NEOPLASM OF BRAIN: ICD-10-CM

## 2025-03-14 PROCEDURE — 99999 PR PBB SHADOW E&M-EST. PATIENT-LVL IV: CPT | Mod: PBBFAC,,, | Performed by: HOSPITALIST

## 2025-03-14 PROCEDURE — 99214 OFFICE O/P EST MOD 30 MIN: CPT | Mod: PBBFAC | Performed by: HOSPITALIST

## 2025-03-14 RX ORDER — CEPHALEXIN 500 MG/1
500 CAPSULE ORAL 4 TIMES DAILY
Qty: 20 CAPSULE | Refills: 0 | Status: SHIPPED | OUTPATIENT
Start: 2025-03-14 | End: 2025-03-19

## 2025-03-14 NOTE — PROGRESS NOTES
[facility] MEDICAL ONCOLOGY - FOLLOW UP VISIT    Reason for visit: Adenocarcinoma of the SANJAY      Oncology History   Adenocarcinoma, lung, left   3/19/2024 Imaging Significant Findings    CTA PE (hemoptysis)  - 3.4 x 5.8 cm L hilar mass, probable invasion of distal L main bronchus and SANJAY bronchi     3/19/2024 - 3/22/2024 Hospital Admission    Presented with new onset hemoptysis.  Imaging demonstrated left hilar mass.  Bronchoscopy performed and confirmed adenocarcinoma, EGFR Exon 20 insertion.     3/21/2024 Procedure    Bronch w/ EBUS  SANJAY, Endobronchial Bx  - Adenocarcinoma (CK7 and TTF1 positive; CK20, p40, and CK 5/6 negative)    SANJAY, BAL  - Adenocarcinoma    TEMPUS NGS/PD-L1  - PD-L1 TPS: 35%  - EGFR exon 20 inframe insertion ([id number])  - TP53 misssense ([id number])  - Negative: EGFR, ALK, BRAF, KRAS, ROS1, RET, MET, ERBB2  - TMB 9.5 m/MB     4/3/2024 Imaging Significant Findings    MRI Brain  - 0.8 cm rim enhancing lesion in L thalamus, no significant mass effect      4/11/2024 Imaging Significant Findings    PET CT  - 4.8 x 2.3 cm  L hilar mass, SUV 14  - Adjacent pulmonary nodules, e.g. 1.2 cm  - No intrathoracic LAD     4/12/2024 Cancer Staged    Staging form: Lung, AJCC 8th Edition  - Clinical stage from 4/12/2024: Stage STEF (cT3, cN1, cM1b)     4/30/2024 Procedure    Bronch with EBUS  RUL, EBBx  - Adenocarcinoma (TTF-1 positive, p40 negative)    LN  - Adenocarcinoma    Procedure Note  - Station for L was normal-sized not sampled; no other lymph nodes were visible by EBUS, including station 7, 4R, and 11R     5/8/2024 - 6/18/2024 Radiation Therapy    Treatment Summary  Course: C1 Lung 2024  Treatment Site Energy Dose/Fx (Gy) #Fx Total Dose (Gy) Start Date End Date Elapsed Days   Left hilum/lung 6X 2 30 / 30 60 5/8/2024 6/18/2024 41        5/9/2024 - 6/18/2024 Chemotherapy    Carboplatin/Paclitaxel, Concurrent w/ Radiation Therapy  Weekly  - Carboplatin AUC 2  -  Paclitaxel 45 mg/m2     6/27/2024 Imaging Significant Findings    CT C/A/P  - 3.2 x 3.1 cm left hilar mass, previously 3.7 cm  - Previously seen 1.2 cm nodule adjacent to primary mass not seen on this exam  - Near complete resolution of multiple prior nodular opacities  - Stable RUL micronodule     7/18/2024 -  Chemotherapy    Treatment Summary   Plan Name: OP DURVALUMAB 1500 MG Q4W  Treatment Goal: Curative  Status: Active  Start Date: 7/18/2024  End Date: 9/12/2025 (Planned)  Provider: Isacc Nuñez IV, MD  Chemotherapy: [No matching medication found in this treatment plan]     7/26/2024 Imaging Significant Findings    MRI Brain  - 2.2 cm peripherally enhancing cystic lesion of left thalamus, previously 0.8 cm.  Smaller surrounding edema signal, localized mass effect with slight effacement of the adjacent lateral ventricle, no hydrocephalus or midline shift.     8/2/2024 - 8/7/2024 Radiation Therapy    Treating physician: Bienvenido Henson  GK SRT  Target # Site Dose per Fraction (Gy) Cumulative Dose % Isodose Line Fraction # Total Fractions   1 Lt Thalamic 9 27 51 3 3          9/18/2024 Imaging Significant Findings    PET CT  4.9 x 2.4 cm left hilar mass (previously 4.8 x 2.3 cm), SUV 7.3 (previously 14)  0.5 cm satellite pulmonary SANJAY nodule, previously 1.2 cm  1 0.0 x 0.9 cm new satellite hypermetabolic pulmonary nodule in the anterior segment of SANJAY, SUV 9.4, new, radiation change vs progression  Development of patchy solid and ground-glass opacities with interlobular wall thickening in posterior segment of SANJAY, radiation change vs progression     10/7/2024 Imaging Significant Findings    MRI Brain  1.9 x 1.7 cm right cerebellar hemisphere irregular enhancing lesion, new from prior.  Moderate surrounding vasogenic edema spanning 5 cm  1.5 x 1.0 cm dorsal left thalamic lesion, decreased from 2.2 x 1.7 cm     10/11/2024 - 10/11/2024 Radiation Therapy    Treating physician: Bienvenido Henson  Gamma Knife  SRS  Target # Site Dose per Fraction (Gy) Cumulative Dose % Isodose Line Total Fractions   1 C2 Rt Temporal 20 20 56 1        10/15/2024 Imaging Significant Findings    CT C, Non-Con  Interval worsening of left-sided pleural pulmonary process likely representing radiation pneumonitis   Interval development of soft tissue density nodules in both lungs of unclear etiology (new 0.6 cm lesion in RUL; new 1.2 cm lesion in LLL)     12/3/2024 Imaging Significant Findings    CT C  2.2 x 1.1 spiculated nodule in central LLL, new compared to 10/15/24, given location may represent radiation change of recurrent malignancy is not excluded  1.3 x 0.5 cm SANJAY nodule with concave margins unchanged  Interval resolution of irregular nodules in right upper and left lower lobes compared to 10/15/24     1/31/2025 - 1/31/2025 Radiation Therapy    Treating physician: Bienvenido Henson  Gamma Knife SRS  Target # Site Dose per Fraction (Gy) Cumulative Dose % Isodose Line Total Fractions   1 C3 Rt Temporal 22 22 80 1        2/7/2025 - 2/12/2025 Hospital Admission    Re-presented to ED after vision loss (left-sided visual field in both eyes) repeat CT stroke without evidence of acute infarct.  Patient on Decadron for vasogenic edema control.  Started memantine.  Unlikely symptoms are related to new metastasis.  Lumbar puncture without evidence of infection of the cytology and cultures pending at time of discharge.  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 is a 78 y.o. male with pmh significant for restless leg syndrome, COPD, CAD s/p stent (2013), HTN, HLD, obesity, GERD, and hearing loss requiring hearing aids who presents for follow up of his lung cancer     1) Stage IV ([id number]) adenocarcinoma of the L hilum (PD-L1 35%, EGFR exon 20 insertion) with mets to the brain, initially dx'd 3/21/24 as stage IIIA disease, completed CRT (5/8/24-6/18/24) with weekly carboplatin/paclitaxel (5/9/24-6/18/24), currently on consolidation durvalumab (7/18/24 - 12/6/24; held 9/26/24-12/6/24 due to likely radiation pneumonitis, C3 on 12/6/24, held subsequently for pneumonitits), w/ thalamic met proven radiographically (7/26/24) s/p GK (27 Gy/3 Fx, 8/2/24-8/7/24), R temporal met proven radiographically (10/7/24) s/p GK (20 Gy/1Fx, 10/11/24), and R temporal lobe met proven radiographically (1/24/25) s/p GK (22 Gy/1 Fx, 1/31/25), currently on surveillance     Last clinic 2/26/25, repeat CT C/A/P now, if no recurrent extracranial disease continue surveillance otherwise consider locally ablative therapy versus amivantamab/carboplatin/pemetrexed depending on recurrence pattern.     Interval History:   3/5/25: CT C/A/P, stable as above    The pt states "my pnuemonitis has improved greatly. I can feel a little bit, but it has improved greatly. It doesn't even bother me to the point of taking tylenol, I've come to deal with it".      He says that he has been discharged from OT, though he is continuing with PT. He notes that his ankles have been slightly swelling and turning red. The right is markedly worse than the left (see media tab).     He says "my vision has improved greatly since my little TIA or whatever it was". He is working with a neuroopthalmologist.     Overall, he says "I feel absolutely great", but notes that he does have a difficult time arousing in the morning.       ROS:   As per HPI.     Physical Exam:     /80 (Patient Position: Sitting)   " "Pulse 75   Ht 5' 9" (1.753 m)   Wt 89.2 kg (196 lb 10.4 oz)   SpO2 98%   BMI 29.04 kg/m²                Physical Exam  Constitutional:       Appearance: Normal appearance.   HENT:      Head: Normocephalic and atraumatic.   Eyes:      Extraocular Movements: Extraocular movements intact.      Conjunctiva/sclera: Conjunctivae normal.      Pupils: Pupils are equal, round, and reactive to light.   Cardiovascular:      Rate and Rhythm: Normal rate and regular rhythm.      Heart sounds: No murmur heard.     No friction rub. No gallop.   Pulmonary:      Effort: Pulmonary effort is normal.      Breath sounds: No wheezing, rhonchi or rales.   Musculoskeletal:         General: Normal range of motion.      Right lower leg: No edema.      Left lower leg: No edema.   Skin:     General: Skin is warm and dry.      Comments: Circumferential erythema over right lower shin, images in media tab   Neurological:      Mental Status: He is alert and oriented to person, place, and time.   Psychiatric:         Mood and Affect: Mood normal.         Thought Content: Thought content normal.         Judgment: Judgment normal.           Labs:   No results found for this or any previous visit (from the past 48 hours).            Imaging:    See oncologic history above.     Path:  See oncologic history above.      Assessment and Plan:     Jordin Allen Jr. is a 78 y.o. male with pmh significant for restless leg syndrome, COPD, CAD s/p stent (2013), HTN, HLD, obesity, GERD, and hearing loss requiring hearing aids who presents for follow up of his lung cancer     1) Stage IV ([id number]) adenocarcinoma of the L hilum (PD-L1 35%, EGFR exon 20 insertion) with mets to the brain, initially dx'd 3/21/24 as stage IIIA disease, completed CRT (5/8/24-6/18/24) with weekly carboplatin/paclitaxel (5/9/24-6/18/24), currently on consolidation durvalumab (7/18/24 - 12/6/24; held 9/26/24-12/6/24 due to likely radiation pneumonitis, C3 on 12/6/24, held " subsequently for pneumonitits), w/ thalamic met proven radiographically (7/26/24) s/p GK (27 Gy/3 Fx, 8/2/24-8/7/24), R temporal met proven radiographically (10/7/24) s/p GK (20 Gy/1Fx, 10/11/24), and R temporal lobe met proven radiographically (1/24/25) s/p GK (22 Gy/1 Fx, 1/31/25), currently on surveillance    Stage IIIA Adenocarcinoma of L Hilum, EGFR Exon 20 insertion, PD-L1 35%  Radiation Pneumonitis  ECOG PS 0-1. Patient received 2 cycles of consolidative durvalumab. Given he had two episodes of pneumonitis (admitted on 12/27/24 during his second episode), the durvalumab has been discontinued indefinitely. He was recently admitted with abnormal neurological symptoms and left eye vision changes. Extensive workup was completed with no clear etiology for his symptoms. MRI and CT negative for new metastatic lesions, stroke, or bleed. He had a LP which was negative for infection or malignant cells. 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 He was also seen by neurology who recommended prophylactic keppra for now.  Most recent extracranial imaging (CT C/A/P, 3/5/25) is relatively stable compared to prior, noting only a new pulmonary micro nodule in the SANJAY likely inflammatory in etiology, as well as improvement in the previously seen LLL nodular and ground-glass opacities.  Therefore, will continue with surveillance at this time, with consideration of either locally directed therapies or amivantamab/carboplatin/pemetrexed at time of progression depending on recurrence pattern.      PLAN:   Continue on surveillance, consolidative immunotherapy permanently discontinued due to two episodes of pneumonitis  Repeat CT C/A/P 12 weeks from last, due around 5/28/25  RTC at least 1 day after imaging      Brain Met  0.8 cm rim enhancing lesion in L thalamus on initial imaging. On initial review with radiology, low likelihood of malignancy and instead more likely cystic  lesion, so pt treated w/ curative intent therapy for intrathoracic disease with plan for surveillance. Unfortunately, repeat MRI brain on 7/26/24 demonstrated growth to 2.2 cm. Pt now s/p GK to this solitary lesion, continuing to treat as oligometastatic disease as above. Repeat MRI brain (10/7/24) demonstrated a new right cerebellar hemisphere hilar irregular enhancing lesion, for which patient underwent SRS on 10/11/24. R temporal lobe met proven radiographically (1/24/25), s/p GK (22 Gy/1 Fx, 1/31/25). Recently admitted for abnormal neurological symptoms, MRI on 2/8/25 with no new brain lesions, lumbar puncture largely unrevealing, some concern for radiation necrosis.   MRI brain on 4/29/25  Follow-up with neurosurgery and rad onc on 4/29/25      RLE Cellulitis  See media tab. Surrounding obvious wound. No underlying fluctuance, no purulent drainage.   Cephalexin 500 four times daily x5 days  Pt to message if no improvement or worsening      Fatigue  Likely multifactorial, including completion of CRT, GK to CNS met, diarrheal illness, and decreased PO intake. Significantly improved after course of steroids for pneumonitis and rehabilitation.   Continue to follow with palliative care      The above information has been reviewed with the patient and all questions have been answered to their apparent satisfaction.  They understand that they can call the clinic with any questions.      Route Chart for Scheduling  Med Onc Route Chart for Scheduling

## 2025-03-15 ENCOUNTER — PATIENT MESSAGE (OUTPATIENT)
Dept: ADMINISTRATIVE | Facility: OTHER | Age: 80
End: 2025-03-15
Payer: MEDICARE

## 2025-03-16 ENCOUNTER — PATIENT MESSAGE (OUTPATIENT)
Dept: ADMINISTRATIVE | Facility: OTHER | Age: 80
End: 2025-03-16
Payer: MEDICARE

## 2025-03-18 ENCOUNTER — PATIENT MESSAGE (OUTPATIENT)
Dept: ADMINISTRATIVE | Facility: OTHER | Age: 80
End: 2025-03-18
Payer: MEDICARE

## 2025-03-18 ENCOUNTER — TELEPHONE (OUTPATIENT)
Dept: HEMATOLOGY/ONCOLOGY | Facility: CLINIC | Age: 80
End: 2025-03-18
Payer: MEDICARE

## 2025-03-18 NOTE — TELEPHONE ENCOUNTER
Spoke to pt to schedule labs, CT CAP, and f/u with Dr. Nuñez. Pt approved all appt times and thanked me for calling.

## 2025-03-19 ENCOUNTER — PATIENT MESSAGE (OUTPATIENT)
Dept: ADMINISTRATIVE | Facility: OTHER | Age: 80
End: 2025-03-19
Payer: MEDICARE

## 2025-03-23 ENCOUNTER — PATIENT MESSAGE (OUTPATIENT)
Dept: HEMATOLOGY/ONCOLOGY | Facility: CLINIC | Age: 80
End: 2025-03-23
Payer: MEDICARE

## 2025-03-23 ENCOUNTER — PATIENT MESSAGE (OUTPATIENT)
Dept: RADIATION ONCOLOGY | Facility: CLINIC | Age: 80
End: 2025-03-23
Payer: MEDICARE

## 2025-03-23 ENCOUNTER — PATIENT MESSAGE (OUTPATIENT)
Dept: ADMINISTRATIVE | Facility: OTHER | Age: 80
End: 2025-03-23
Payer: MEDICARE

## 2025-03-24 ENCOUNTER — PATIENT MESSAGE (OUTPATIENT)
Dept: ADMINISTRATIVE | Facility: OTHER | Age: 80
End: 2025-03-24
Payer: MEDICARE

## 2025-03-24 DIAGNOSIS — C79.31 SECONDARY MALIGNANT NEOPLASM OF BRAIN: Primary | ICD-10-CM

## 2025-03-24 RX ORDER — LEVETIRACETAM 500 MG/1
500 TABLET ORAL 2 TIMES DAILY
Qty: 60 TABLET | Refills: 11
Start: 2025-03-24 | End: 2026-03-24

## 2025-03-25 ENCOUNTER — PATIENT MESSAGE (OUTPATIENT)
Dept: ADMINISTRATIVE | Facility: OTHER | Age: 80
End: 2025-03-25
Payer: MEDICARE

## 2025-03-26 ENCOUNTER — TELEPHONE (OUTPATIENT)
Dept: RADIATION ONCOLOGY | Facility: CLINIC | Age: 80
End: 2025-03-26
Payer: MEDICARE

## 2025-03-26 ENCOUNTER — PATIENT MESSAGE (OUTPATIENT)
Dept: ADMINISTRATIVE | Facility: OTHER | Age: 80
End: 2025-03-26
Payer: MEDICARE

## 2025-03-26 ENCOUNTER — TELEPHONE (OUTPATIENT)
Dept: HEMATOLOGY/ONCOLOGY | Facility: CLINIC | Age: 80
End: 2025-03-26
Payer: MEDICARE

## 2025-03-26 DIAGNOSIS — C79.31 METASTASIS TO BRAIN: Primary | ICD-10-CM

## 2025-03-26 RX ORDER — DEXAMETHASONE 4 MG/1
4 TABLET ORAL 2 TIMES DAILY
Qty: 60 TABLET | Refills: 0 | Status: SHIPPED | OUTPATIENT
Start: 2025-03-26

## 2025-03-26 NOTE — TELEPHONE ENCOUNTER
Patient reached out expressing worsening HA. He states he has had Left-sided HA for months, daily. They have been relieved w/ Tylenol in the past, but recently it has been getting worse despite taking Tylenol several times a day. He confirmed that he is taking acetaminophen only (not w/ caffeine).     Denies new visual change, N/V, or other neurologic deficits.     I discussed resuming a steroid outpatient and moving up his next MRI brain vs if pain is severe enough going to the ED. He preferred to try to manage it outpatient and says steroid has helped in the past.     He has not yet picked up the Keppra that Neurosurgery prescribed 2 days ago.     Plan:  1) Dexamethasone 4 mg bid; will taper after MRI.   2) Move up MRI Brain within the next week and schedule follow up visit with me to discuss.   3) Discontinue Memantine. This was started while inpatient; no evidence of benefit for patients treated with radiosurgery.   4) He will  Keppra at [facility] and wait to taper this after meeting with Neurology. It is not clear that he ever had a seizure; it appears this was started while inpatient just in case his symptoms were caused by that. Ultimately attributed to TIA?

## 2025-03-26 NOTE — TELEPHONE ENCOUNTER
"Spoke with patient. Pt c/o dull HA over L side of his head that is constant. Pt initially found relief with tylenol, but it does not seem to help anymore. Pt also reports no appetite & extreme fatigue. Pt states his is doing well with fluid intake. Pt reports that his VS this morning with PT were normal.     Pt states "I am considering calling 911." Informed pt that if he felt the pain to be that severe then he should report to ER. Pt would like to wait and see what Dr. Nuñez says before going to ER.  "

## 2025-03-26 NOTE — TELEPHONE ENCOUNTER
----- Message from Eric sent at 3/26/2025 10:32 AM CDT -----  Regarding: Urgent Consult/Advisory  Contact: [phone]  Urgent Consult/Advisory Name Of Caller: [facility]   Contact Preference: [facility] # [phone] or  [phone] Nature of call: requesting an appt for today or some time within this week, he states pt has not been feeling well. Please call to advise thank you

## 2025-03-27 ENCOUNTER — OFFICE VISIT (OUTPATIENT)
Dept: URGENT CARE | Facility: CLINIC | Age: 80
End: 2025-03-27
Payer: MEDICARE

## 2025-03-27 ENCOUNTER — PATIENT MESSAGE (OUTPATIENT)
Dept: ADMINISTRATIVE | Facility: OTHER | Age: 80
End: 2025-03-27
Payer: MEDICARE

## 2025-03-27 VITALS
RESPIRATION RATE: 16 BRPM | OXYGEN SATURATION: 96 % | HEIGHT: 69 IN | DIASTOLIC BLOOD PRESSURE: 67 MMHG | SYSTOLIC BLOOD PRESSURE: 100 MMHG | TEMPERATURE: 98 F | HEART RATE: 71 BPM | BODY MASS INDEX: 29.03 KG/M2 | WEIGHT: 196 LBS

## 2025-03-27 DIAGNOSIS — L02.214 ABSCESS OF RIGHT GROIN: Primary | ICD-10-CM

## 2025-03-27 PROCEDURE — 99499 UNLISTED E&M SERVICE: CPT | Mod: S$GLB,,, | Performed by: NURSE PRACTITIONER

## 2025-03-27 RX ORDER — SULFAMETHOXAZOLE AND TRIMETHOPRIM 800; 160 MG/1; MG/1
1 TABLET ORAL 2 TIMES DAILY
Qty: 10 TABLET | Refills: 0 | Status: SHIPPED | OUTPATIENT
Start: 2025-03-27 | End: 2025-04-01

## 2025-03-27 NOTE — PROCEDURES
"Incision & Drainage    Date/Time: 3/27/2025 12:15 PM    Performed by: Tara Da Silva NP  Authorized by: Tara Da Silva NP    Time out: Immediately prior to procedure a "time out" was called to verify the correct patient, procedure, equipment, support staff and site/side marked as required.    Consent Done?:  Yes (Verbal)    Type:  Abscess  Body area:  Anogenital  Anesthesia:  Local infiltration  Local anesthetic: Lidocaine 1% without epinephrine  Anesthetic total (ml):  1  Scalpel size:  11  Incision type:  Single straight  Incision depth: dermal    Complexity:  Simple  Drainage:  Purulent and bloody  Drainage amount:  Copious  Wound treatment:  Incision, drainage, deloculation and expression of material  Packing material:  None  Patient tolerance:  Patient tolerated the procedure well with no immediate complications    "

## 2025-03-27 NOTE — PROGRESS NOTES
"Subjective:      Patient ID: Jordin Allen Jr. is a 79 y.o. male.    Vitals:  height is 5' 9" (1.753 m) and weight is 88.9 kg (196 lb). His oral temperature is 97.6 °F (36.4 °C). His blood pressure is 100/67 and his pulse is 71. His respiration is 16 and oxygen saturation is 96%.     Chief Complaint: Cyst    Pt states cyst in right groin/leg, noticed a few days ago but noticed the lump today. No drainage. He has lung CA with concerns of brain metastasis. He is getting an MRI done tomorrow and worried this will hinder it.     Cyst  This is a new problem. The current episode started in the past 7 days. Pertinent negatives include no fever.     Constitution: Negative for fever.   Skin:  Negative for erythema.      Objective:     Physical Exam   Constitutional: He is oriented to person, place, and time. He appears well-developed.   HENT:   Head: Normocephalic and atraumatic. Head is without abrasion, without contusion and without laceration.   Ears:   Right Ear: External ear normal.   Left Ear: External ear normal.   Nose: Nose normal.   Mouth/Throat: Oropharynx is clear and moist and mucous membranes are normal.   Eyes: Conjunctivae, EOM and lids are normal. Pupils are equal, round, and reactive to light.   Neck: Trachea normal and phonation normal. Neck supple.   Musculoskeletal: Normal range of motion.         General: Normal range of motion.   Neurological: He is alert and oriented to person, place, and time.   Skin: Skin is warm, dry, intact, no rash and abscessed. Capillary refill takes less than 2 seconds. No abrasion, No burn, No bruising, No erythema and No ecchymosis        Psychiatric: His speech is normal and behavior is normal. Judgment and thought content normal.   Nursing note and vitals reviewed.      Assessment:     1. Abscess of right groin        Plan:       Abscess of right groin  -     sulfamethoxazole-trimethoprim 800-160mg (BACTRIM DS) 800-160 mg Tab; Take 1 tablet by mouth 2 (two) times daily. " "for 5 days  Dispense: 10 tablet; Refill: 0  -     Incision & Drainage    Will treat with Bactrim given copious amount of foul discharge from abscess.        Incision & Drainage    Date/Time: 3/27/2025 12:15 PM    Performed by: Tara Da Silva NP  Authorized by: Tara Da Silva NP    Time out: Immediately prior to procedure a "time out" was called to verify the correct patient, procedure, equipment, support staff and site/side marked as required.    Consent Done?:  Yes (Verbal)    Type:  Abscess  Body area:  Anogenital  Anesthesia:  Local infiltration  Local anesthetic: Lidocaine 1% without epinephrine  Anesthetic total (ml):  1  Scalpel size:  11  Incision type:  Single straight  Incision depth: dermal    Complexity:  Simple  Drainage:  Purulent and bloody  Drainage amount:  Copious  Wound treatment:  Incision, drainage, deloculation and expression of material  Packing material:  None  Patient tolerance:  Patient tolerated the procedure well with no immediate complications            "

## 2025-03-27 NOTE — PATIENT INSTRUCTIONS
Bactrim twice a day for 5 days    After 24 hours injury, you can gently wash the wound with soap and water. Pat dry and put on a clean dressing. A telfa pad will not stick to the wound.  Change your dressing once a day or every other day.  Always wash your hands before and after touching the wound.  Each time you change the dressing, look closely at the wound to be sure it is healing the right way.   Avoid picking the scab or scratching the site which may cause more irritation.  Do not soak in water or swim with an open wound.  Do not use hydrogen peroxide; it will cause the wound to dry out or delay wound healing/new skin growth.  Please complete full course of oral antibiotics.       If not allergic, take Tylenol (Acetaminophen) 650 mg to  1 g every 6 hours as needed for fever/pain and/or Motrin (Ibuprofen) 600 to 800 mg every 6 hours as needed for pain and/or fever      Please remember that you have received care at an urgent care today. Urgent cares are not emergency rooms and are not equipped to handle life threatening emergencies and cannot rule in or out certain medical conditions and you may be released before all of your medical problems are known or treated. Please arrange follow up with your primary care physician or speciality clinic  within 2-5 days if your signs and symptoms have not resolved or worsen. Patient can call our Referral Hotline at [phone] to make an appointment.    Please return here or go to the Emergency Department for any concerns or worsening of condition.Patient was educated on signs/symptoms that would warrant emergent medical attention. Patient verbalized understanding.

## 2025-03-28 ENCOUNTER — OFFICE VISIT (OUTPATIENT)
Dept: RADIATION ONCOLOGY | Facility: CLINIC | Age: 80
End: 2025-03-28
Payer: MEDICARE

## 2025-03-28 ENCOUNTER — PATIENT MESSAGE (OUTPATIENT)
Dept: ADMINISTRATIVE | Facility: OTHER | Age: 80
End: 2025-03-28
Payer: MEDICARE

## 2025-03-28 ENCOUNTER — HOSPITAL ENCOUNTER (OUTPATIENT)
Dept: RADIOLOGY | Facility: HOSPITAL | Age: 80
Discharge: HOME | End: 2025-03-28
Attending: RADIOLOGY
Payer: MEDICARE

## 2025-03-28 VITALS
RESPIRATION RATE: 20 BRPM | HEIGHT: 69 IN | DIASTOLIC BLOOD PRESSURE: 87 MMHG | TEMPERATURE: 98 F | WEIGHT: 194 LBS | HEART RATE: 66 BPM | BODY MASS INDEX: 28.73 KG/M2 | SYSTOLIC BLOOD PRESSURE: 118 MMHG | OXYGEN SATURATION: 96 %

## 2025-03-28 DIAGNOSIS — G44.229 CHRONIC TENSION-TYPE HEADACHE, NOT INTRACTABLE: ICD-10-CM

## 2025-03-28 DIAGNOSIS — C79.31 METASTASIS TO BRAIN: ICD-10-CM

## 2025-03-28 DIAGNOSIS — C79.31 METASTASIS TO BRAIN: Primary | ICD-10-CM

## 2025-03-28 PROCEDURE — 99999 PR PBB SHADOW E&M-EST. PATIENT-LVL V: CPT | Mod: PBBFAC,,, | Performed by: RADIOLOGY

## 2025-03-28 PROCEDURE — A9585 GADOBUTROL INJECTION: HCPCS | Performed by: RADIOLOGY

## 2025-03-28 PROCEDURE — 70553 MRI BRAIN STEM W/O & W/DYE: CPT | Mod: 26,,, | Performed by: RADIOLOGY

## 2025-03-28 PROCEDURE — 99215 OFFICE O/P EST HI 40 MIN: CPT | Mod: PBBFAC,25 | Performed by: RADIOLOGY

## 2025-03-28 PROCEDURE — 70553 MRI BRAIN STEM W/O & W/DYE: CPT | Mod: TC

## 2025-03-28 PROCEDURE — 25500020 PHARM REV CODE 255: Performed by: RADIOLOGY

## 2025-03-28 RX ORDER — GADOBUTROL 604.72 MG/ML
10 INJECTION INTRAVENOUS
Status: COMPLETED | OUTPATIENT
Start: 2025-03-28 | End: 2025-03-28

## 2025-03-28 RX ADMIN — GADOBUTROL 10 ML: 604.72 INJECTION INTRAVENOUS at 07:03

## 2025-03-28 NOTE — PROGRESS NOTES
3/28/2025    Radiation Oncology Follow-Up Visit      Prior Radiation History:   Course 1:    Site  Energy  Dose/Fx (Gy)  #Fx  Total Dose (Gy)  Start Date  End Date  Elapsed Days    Left hilum/lung  6X  2  30 / 30  60  5/8/2024 6/18/2024  41      Course 2: GK SRT 8/2/24 - 8/7/24  Target # Site Dose per Fraction (Gy) Cumulative Dose % Isodose Line Fraction # Total Fractions   1 Lt Thalamic 9 27 51 3 3     Course 3: GK SRS 10/11/24  Target # Site Dose per Fraction (Gy) Cumulative Dose % Isodose Line Total Fractions   1 C2 Rt Temporal 20 20 56 1     Course 4: GK SRS 1/31/25  Target # Site Dose per Fraction (Gy) Cumulative Dose % Isodose Line Total Fractions   1 C3 Rt Temporal 22 22 80 1     Is the patient female between ages 15-55:  No    Does the patient have a CIED:  No      Assessment   This is a 79 y.o. 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 Deng. Staging MRI Brain 4/3/24 demonstrated a 0.8 cm Left thalamic metastasis; this was observed. MRI Brain 7/26/24 demonstrated interval increase in size to 2.2 cm; no other evidence of intracranial disease. He completed GK SRT 27 Gy in 3 fx on 8/7/24. MRI Brain 10/7/24 demonstrated new 1.9 cm Right medial temporo-occipital metastasis. He completed GK SRS 20 Gy x1 to Rt temporal metastasis 10/11/24. MRI Brain 1/24/25 demonstrated a 0.4 cm posterior Right temporal lobe met. This was treated 22 Gy x1 on 1/31/25.     MRI Brain today 3/28/25 with improved edema in Right temporal lobe and stable treated enhancing lesion in the Right temporal lobe. No evidence of new/recurrent intracranial disease.     I do not have an oncologic explanation for his HA. He has been referred to Neurology by Neurosurgery for discussion of continuing Keppra or not. Also recommend discussion for HA management. He scheduled an appt with them by [facility] but the first available was in 5 months. I  encouraged him to call them to see if there is an opening sooner.          Plan   1) We will see him back in Neuro-Onc clinic in 3 months with MRI Brain prior.   2) Steroid taper provided. I cautioned that he may get rebound HA after he discontinues the steroid.   3) Follow up with Dr. Nuñez as scheduled for continued systemic management.            CHIEF COMPLAINT: Brain metastasis    HPI/Focused ROS: After his last treatment, he presented to ED 2/7/25 with new onset AMS, Left-sided weakness and Left visual field deficits. LP negative for cytology or infection. No changes on MRI Brain at the time other than some increased edema around Right temporal treated metastasis. He was discharged to rehab and finally went home on 2/21/25. On 3/26/25 he reached out expressing worsening HA. He has had Left-sided HA for months, daily. They have been relieved w/ Tylenol in the past, but recently it has been getting worse despite taking Tylenol several times a day. He confirmed that he is taking acetaminophen only (not w/ caffeine).      HA has improved significantly since starting dexamethasone, but now he is having difficulty sleeping. Denies focal weakness. Prior Left visual field deficit improved following his hospitalization, now there is only a slight shadow.         Past Medical History:   Diagnosis Date    Benign neoplasm of colon 10/01/2013    Bronchitis chronic     CAD (coronary artery disease) 10/31/2013    COPD (chronic obstructive pulmonary disease)     Emphysema of lung     Encounter for preventive health examination 3/21/2018    GERD (gastroesophageal reflux disease)     Hx of colonic polyps     Hypertension     NAFLD (nonalcoholic fatty liver disease) 03/27/2017    SHOLA on CPAP     RLS (restless legs syndrome)     Screen for colon cancer 08/30/2013       Past Surgical History:   Procedure Laterality Date    bilateral foot surgery  1993    CARDIAC CATHETERIZATION  2013    x1    CATARACT EXTRACTION, BILATERAL       COLON SURGERY      Ace Mott MD    COLONOSCOPY N/A 3/21/2018    Procedure: COLONOSCOPY;  Surgeon: Terry Mott MD;  [facility]  Service: Endoscopy;  Laterality: N/A;    COLONOSCOPY N/A 2019    Procedure: COLONOSCOPY;  Surgeon: John Mantilla MD;  Location[facility]  Service: Endoscopy;  Laterality: N/A;    COLONOSCOPY N/A 2022    Procedure: COLONOSCOPY;  Surgeon: MEDINA Farris MD;  Location[facility]  Service: Endoscopy;  Laterality: N/A;  fully vacc-inst portal-tb    ENDOBRONCHIAL ULTRASOUND Bilateral 2024    Procedure: ENDOBRONCHIAL ULTRASOUND (EBUS);  Surgeon: Naveed Aguero MD;  Location: [facility] OR;  Service: Pulmonary;  Laterality: Bilateral;    scrotal abscess removal         Social History     Tobacco Use    Smoking status: Former     Current packs/day: 0.00     Average packs/day: 1 pack/day for 40.0 years (40.0 ttl pk-yrs)     Types: Cigarettes     Start date: 8/15/1972     Quit date: 8/15/2012     Years since quittin.6     Passive exposure: Never    Smokeless tobacco: Never   Substance Use Topics    Alcohol use: Yes     Alcohol/week: 3.0 standard drinks of alcohol     Types: 3 Cans of beer per week     Comment: maybe 2-3  beers weekly    Drug use: No       Cancer-related family history is negative for Prostate cancer.    Current Outpatient Medications on File Prior to Visit   Medication Sig Dispense Refill    albuterol (ACCUNEB) 0.63 mg/3 mL Nebu Inhale 3 mLs (0.63 mg total) by nebulization every 6 (six) hours as needed (if symptoms failed to improve with inhaler). Rescue 375 mL 11    albuterol (PROVENTIL/VENTOLIN HFA) 90 mcg/actuation inhaler Inhale 2 puffs into the lungs every 4 (four) hours as needed for Wheezing. 8.5 g 11    amitriptyline (ELAVIL) 25 MG tablet Take 1 tablet (25 mg total) by mouth once daily. 90 tablet 3    aspirin (ECOTRIN) 81 MG EC tablet Take 81 mg by mouth once daily.       atorvastatin (LIPITOR) 80 MG  tablet Take 1 tablet (80 mg total) by mouth in the morning. 30 tablet 0    BETA-CAROTENE,A, W-C & E/MIN (OCUVITE ORAL) Take 1 capsule by mouth once daily.       budesonide-glycopyr-formoterol (BREZTRI AEROSPHERE) 160-9-4.8 mcg/actuation HFAA Inhale 2 puffs into the lungs 2 (two) times a day. 10.7 g 3    dexAMETHasone (DECADRON) 4 MG Tab Take 1 tablet (4 mg total) by mouth 2 (two) times daily. 60 tablet 0    echinacea 400 mg Cap Take 1 capsule by mouth once daily.       fluticasone propionate (FLONASE) 50 mcg/actuation nasal spray Spray 2 sprays (100 mcg total) in Each Nostril once daily. 16 g 11    latanoprost 0.005 % ophthalmic solution Instill 1 drop into both eyes every night at bedtime 7.5 mL 4    levETIRAcetam (KEPPRA) 500 MG Tab Take 1 tablet (500 mg total) by mouth 2 (two) times daily. 60 tablet 11    losartan (COZAAR) 100 MG tablet Take 1 tablet (100 mg total) by mouth once daily. 90 tablet 3    nitroGLYCERIN (NITROSTAT) 0.4 MG SL tablet Place 1 tablet (0.4 mg total) under the tongue every 5 (five) minutes as needed. 100 tablet 3    omeprazole (PRILOSEC) 20 MG capsule Take 1 capsule (20 mg total) by mouth once daily. 90 capsule 3    roflumilast (DALIRESP) 500 mcg Tab Take 1 tablet (500 mcg total) by mouth once daily. 90 tablet 3    senna (SENOKOT) 8.6 mg tablet Take 1 tablet by mouth once daily. 60 tablet 2    sulfamethoxazole-trimethoprim 800-160mg (BACTRIM DS) 800-160 mg Tab Take 1 tablet by mouth 2 (two) times daily. for 5 days 10 tablet 0    traZODone (DESYREL) 50 MG tablet Take 1 tablet (50 mg total) by mouth every evening. 30 tablet 5     Current Facility-Administered Medications on File Prior to Visit   Medication Dose Route Frequency Provider Last Rate Last Admin    dextrose 50% injection 12.5 g  12.5 g Intravenous PRN Lidia Deleon MD        dextrose 50% injection 25 g  25 g Intravenous PRN Lidia Deleon MD        [COMPLETED] gadobutroL (GADAVIST) injection 10 mL  10 mL Intravenous ONCE  "PRN Bienvenido Henson MD   10 mL at 03/28/25 0708    insulin regular injection 0-8 Units  0-8 Units Intravenous Continuous PRN Ldiia Deleon MD           Review of patient's allergies indicates:   Allergen Reactions    Brilinta [ticagrelor] Itching    Lisinopril      Other reaction(s): cough    Metoprolol succinate Rash         Vital Signs: /87   Pulse 66   Temp 97.6 °F (36.4 °C)   Resp 20   Ht 5' 9" (1.753 m)   Wt 88 kg (194 lb 0.1 oz)   SpO2 96%   BMI 28.65 kg/m²     ECOG Performance Status: (2) Ambulatory and capable of self care, unable to carry out work activity, up and about > 50% or waking hours    Physical Exam  Constitutional:       Appearance: Normal appearance.   HENT:      Head: Normocephalic and atraumatic.   Eyes:      General: No scleral icterus.     Extraocular Movements: Extraocular movements intact.   Pulmonary:      Effort: No accessory muscle usage or respiratory distress.   Musculoskeletal:      Cervical back: Normal range of motion.   Neurological:      Mental Status: He is alert and oriented to person, place, and time.      Comments: +Ambulates w/ cane   Psychiatric:         Mood and Affect: Mood and affect normal.         Judgment: Judgment normal.          Labs:    Imaging: I have personally reviewed the patient's available images and reports and summarized pertinent findings above in HPI.     Pathology: No new path            "

## 2025-03-29 ENCOUNTER — PATIENT MESSAGE (OUTPATIENT)
Dept: ADMINISTRATIVE | Facility: OTHER | Age: 80
End: 2025-03-29
Payer: MEDICARE

## 2025-03-30 ENCOUNTER — PATIENT MESSAGE (OUTPATIENT)
Dept: ADMINISTRATIVE | Facility: OTHER | Age: 80
End: 2025-03-30
Payer: MEDICARE

## 2025-03-31 ENCOUNTER — PATIENT MESSAGE (OUTPATIENT)
Dept: RADIATION ONCOLOGY | Facility: CLINIC | Age: 80
End: 2025-03-31
Payer: MEDICARE

## 2025-03-31 ENCOUNTER — EXTERNAL CHRONIC CARE MANAGEMENT (OUTPATIENT)
Dept: PRIMARY CARE CLINIC | Facility: CLINIC | Age: 80
End: 2025-03-31
Payer: MEDICARE

## 2025-03-31 ENCOUNTER — PATIENT MESSAGE (OUTPATIENT)
Dept: ADMINISTRATIVE | Facility: OTHER | Age: 80
End: 2025-03-31
Payer: MEDICARE

## 2025-03-31 PROCEDURE — 99490 CHRNC CARE MGMT STAFF 1ST 20: CPT | Mod: S$PBB,,, | Performed by: INTERNAL MEDICINE

## 2025-03-31 PROCEDURE — 99490 CHRNC CARE MGMT STAFF 1ST 20: CPT | Mod: PBBFAC,PO | Performed by: INTERNAL MEDICINE

## 2025-04-01 ENCOUNTER — PATIENT MESSAGE (OUTPATIENT)
Dept: ADMINISTRATIVE | Facility: OTHER | Age: 80
End: 2025-04-01
Payer: MEDICARE

## 2025-04-02 ENCOUNTER — PATIENT MESSAGE (OUTPATIENT)
Dept: ADMINISTRATIVE | Facility: OTHER | Age: 80
End: 2025-04-02
Payer: MEDICARE

## 2025-04-03 ENCOUNTER — PATIENT MESSAGE (OUTPATIENT)
Dept: ADMINISTRATIVE | Facility: OTHER | Age: 80
End: 2025-04-03
Payer: MEDICARE

## 2025-04-03 ENCOUNTER — TELEPHONE (OUTPATIENT)
Dept: HEMATOLOGY/ONCOLOGY | Facility: CLINIC | Age: 80
End: 2025-04-03
Payer: MEDICARE

## 2025-04-04 ENCOUNTER — PATIENT MESSAGE (OUTPATIENT)
Dept: ADMINISTRATIVE | Facility: OTHER | Age: 80
End: 2025-04-04
Payer: MEDICARE

## 2025-04-05 ENCOUNTER — PATIENT MESSAGE (OUTPATIENT)
Dept: ADMINISTRATIVE | Facility: OTHER | Age: 80
End: 2025-04-05
Payer: MEDICARE

## 2025-04-06 ENCOUNTER — PATIENT MESSAGE (OUTPATIENT)
Dept: ADMINISTRATIVE | Facility: OTHER | Age: 80
End: 2025-04-06
Payer: MEDICARE

## 2025-04-07 ENCOUNTER — PATIENT MESSAGE (OUTPATIENT)
Dept: ADMINISTRATIVE | Facility: OTHER | Age: 80
End: 2025-04-07
Payer: MEDICARE

## 2025-04-07 ENCOUNTER — OFFICE VISIT (OUTPATIENT)
Dept: PODIATRY | Facility: CLINIC | Age: 80
End: 2025-04-07
Payer: MEDICARE

## 2025-04-07 VITALS
HEIGHT: 69 IN | HEART RATE: 66 BPM | WEIGHT: 203.69 LBS | SYSTOLIC BLOOD PRESSURE: 117 MMHG | DIASTOLIC BLOOD PRESSURE: 67 MMHG | BODY MASS INDEX: 30.17 KG/M2

## 2025-04-07 DIAGNOSIS — B35.1 TINEA UNGUIUM: Primary | ICD-10-CM

## 2025-04-07 DIAGNOSIS — M79.671 PAIN IN BOTH FEET: ICD-10-CM

## 2025-04-07 DIAGNOSIS — M79.672 PAIN IN BOTH FEET: ICD-10-CM

## 2025-04-07 PROCEDURE — 99999 PR PBB SHADOW E&M-EST. PATIENT-LVL III: CPT | Mod: PBBFAC,,, | Performed by: STUDENT IN AN ORGANIZED HEALTH CARE EDUCATION/TRAINING PROGRAM

## 2025-04-07 PROCEDURE — 99213 OFFICE O/P EST LOW 20 MIN: CPT | Mod: PBBFAC | Performed by: STUDENT IN AN ORGANIZED HEALTH CARE EDUCATION/TRAINING PROGRAM

## 2025-04-07 PROCEDURE — 11720 DEBRIDE NAIL 1-5: CPT | Mod: PBBFAC | Performed by: STUDENT IN AN ORGANIZED HEALTH CARE EDUCATION/TRAINING PROGRAM

## 2025-04-07 RX ORDER — OMEPRAZOLE 20 MG/1
20 CAPSULE, DELAYED RELEASE ORAL
COMMUNITY

## 2025-04-07 RX ORDER — AMITRIPTYLINE HYDROCHLORIDE 25 MG/1
25 TABLET, FILM COATED ORAL
COMMUNITY

## 2025-04-07 RX ORDER — ROFLUMILAST 500 UG/1
500 TABLET ORAL
COMMUNITY
Start: 2025-02-22

## 2025-04-07 RX ORDER — IBUPROFEN 600 MG/1
600 TABLET ORAL EVERY 8 HOURS PRN
COMMUNITY
Start: 2025-02-21

## 2025-04-07 RX ORDER — FLUTICASONE PROPIONATE 50 MCG
100 SPRAY, SUSPENSION (ML) NASAL
COMMUNITY
Start: 2025-02-22

## 2025-04-07 RX ORDER — DEXAMETHASONE 4 MG/1
4 TABLET ORAL
COMMUNITY

## 2025-04-07 RX ORDER — ATORVASTATIN CALCIUM 80 MG/1
80 TABLET, FILM COATED ORAL
COMMUNITY
Start: 2025-02-22

## 2025-04-07 NOTE — PROCEDURES
"Returns for painful fungal toenails 2nd toes of both feet right worse than left, ongoing tenderness. Furosemide helped, still some swelling but better. Still on steroids for the cancer which cardiology thinks is contributing to the swelling. Still using topical ketoconazole on nails. See prior notes    Routine Foot Care    Date/Time: 4/7/2025 10:00 AM    Performed by: Garrett Lloyd DPM  Authorized by: Garrett Lloyd DPM    Time out: Immediately prior to procedure a "time out" was called to verify the correct patient, procedure, equipment, support staff and site/side marked as required.    Consent Done?:  Yes (Verbal)  Hyperkeratotic Skin Lesions?: No      Nail Care Type:  Debride(Left 2nd Toe and Right 2nd Toe)  Patient tolerance:  Patient tolerated the procedure well with no immediate complications      RTC in 3 months, sooner PRN  "

## 2025-04-08 ENCOUNTER — PATIENT MESSAGE (OUTPATIENT)
Dept: ADMINISTRATIVE | Facility: OTHER | Age: 80
End: 2025-04-08
Payer: MEDICARE

## 2025-04-09 ENCOUNTER — PATIENT MESSAGE (OUTPATIENT)
Dept: ADMINISTRATIVE | Facility: OTHER | Age: 80
End: 2025-04-09
Payer: MEDICARE

## 2025-04-10 ENCOUNTER — PATIENT MESSAGE (OUTPATIENT)
Dept: ADMINISTRATIVE | Facility: OTHER | Age: 80
End: 2025-04-10
Payer: MEDICARE

## 2025-04-11 ENCOUNTER — PATIENT MESSAGE (OUTPATIENT)
Dept: ADMINISTRATIVE | Facility: OTHER | Age: 80
End: 2025-04-11
Payer: MEDICARE

## 2025-04-12 ENCOUNTER — PATIENT MESSAGE (OUTPATIENT)
Dept: ADMINISTRATIVE | Facility: OTHER | Age: 80
End: 2025-04-12
Payer: MEDICARE

## 2025-04-13 ENCOUNTER — PATIENT MESSAGE (OUTPATIENT)
Dept: ADMINISTRATIVE | Facility: OTHER | Age: 80
End: 2025-04-13
Payer: MEDICARE

## 2025-04-14 ENCOUNTER — PATIENT MESSAGE (OUTPATIENT)
Dept: ADMINISTRATIVE | Facility: OTHER | Age: 80
End: 2025-04-14
Payer: MEDICARE

## 2025-04-15 ENCOUNTER — OFFICE VISIT (OUTPATIENT)
Dept: PULMONOLOGY | Facility: CLINIC | Age: 80
End: 2025-04-15
Payer: MEDICARE

## 2025-04-15 ENCOUNTER — PATIENT MESSAGE (OUTPATIENT)
Dept: ADMINISTRATIVE | Facility: OTHER | Age: 80
End: 2025-04-15
Payer: MEDICARE

## 2025-04-15 ENCOUNTER — PATIENT MESSAGE (OUTPATIENT)
Dept: INTERNAL MEDICINE | Facility: CLINIC | Age: 80
End: 2025-04-15
Payer: MEDICARE

## 2025-04-15 VITALS
WEIGHT: 211.19 LBS | DIASTOLIC BLOOD PRESSURE: 81 MMHG | OXYGEN SATURATION: 96 % | SYSTOLIC BLOOD PRESSURE: 139 MMHG | BODY MASS INDEX: 31.28 KG/M2 | HEIGHT: 69 IN | HEART RATE: 80 BPM

## 2025-04-15 DIAGNOSIS — R60.0 LOWER EXTREMITY EDEMA: Primary | ICD-10-CM

## 2025-04-15 DIAGNOSIS — J44.1 CHRONIC OBSTRUCTIVE PULMONARY DISEASE WITH ACUTE EXACERBATION: ICD-10-CM

## 2025-04-15 DIAGNOSIS — C34.92 ADENOCARCINOMA, LUNG, LEFT: ICD-10-CM

## 2025-04-15 PROCEDURE — 99999 PR PBB SHADOW E&M-EST. PATIENT-LVL IV: CPT | Mod: PBBFAC,,, | Performed by: INTERNAL MEDICINE

## 2025-04-15 PROCEDURE — 99214 OFFICE O/P EST MOD 30 MIN: CPT | Mod: PBBFAC | Performed by: INTERNAL MEDICINE

## 2025-04-15 RX ORDER — FUROSEMIDE 20 MG/1
20 TABLET ORAL DAILY
Qty: 30 TABLET | Refills: 11 | Status: SHIPPED | OUTPATIENT
Start: 2025-04-15 | End: 2026-04-15

## 2025-04-15 NOTE — ASSESSMENT & PLAN NOTE
Completed treatment with chemo/XRT. Brain XRT for lesionsx2. Back off immunotherapy after radiology misread of atelectasis on CT.   -discussed interactions between his treatment and COPD.  -suspect the LLL area was consolidation of prior RXT changes and not malignancy and is nearly resolved.  -No current evidence for pneumonitis,  on Dec 2024 or March 2025 imaging.

## 2025-04-15 NOTE — ASSESSMENT & PLAN NOTE
"Significant chronic condition to be followed longitudinally with following long term treatment plan.     spirometry with severe COPD. On breztri. Not in exacerbation.  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     -Reviewed imaging in December and "satellite lesion" was clearly atelectasis and March imaging shows further improvement in post XRT changes.     -If felt needed by oncology, appropriate to re-challenge with close monitoring with PFTs and CT- if rechallenged, imaging should be reviewed by pulmonary prior to any changes in management.  "

## 2025-04-15 NOTE — PROGRESS NOTES
Subjective:       Patient ID: Jordin Allen Jr. is a 79 y.o. male.  The patient's last visit with me was on 12/10/2024.     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 No SOB, MONTIEL, cough. Doing well with Breztri without issues. Immunotherapy stopped in December  over concern for recurrent pneumonitis but CT only with atelectasis and otherwise improved.    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 Having worsening LE edema with this.  Review of Systems    Objective:      Vitals:    04/15/25 0813   BP: 139/81   Pulse: 80     Wt Readings from Last 3 Encounters:   04/15/25 95.8 kg (211 lb 3.2 oz)   04/07/25 92.4 kg (203 lb 11.3 oz)   03/28/25 88 kg (194 lb 0.1 oz)     Temp Readings from Last 3 Encounters:   03/28/25 97.6 °F (36.4 °C)   03/27/25 97.6 °F (36.4 °C) (Oral)   03/06/25 97.1 °F (36.2 °C) (Temporal)     BP Readings from Last 3 Encounters:   04/15/25 139/81   04/07/25 117/67   03/28/25 118/87     Pulse Readings from Last 3 Encounters:   04/15/25 80   04/07/25 66   03/28/25 66       Physical Exam   Constitutional: He appears well-developed and well-nourished.   Pulmonary/Chest: Normal expansion and breath sounds normal.   Musculoskeletal:         General: Edema present.   Vitals reviewed.    CBC  Lab Results   Component Value Date    WBC 10.55 02/12/2025    HGB 12.5 (L) 02/12/2025    HCT 39.9 (L) 02/12/2025    MCV 87 02/12/2025     02/12/2025         CMP  Sodium   Date Value Ref Range Status   02/12/2025 142 136 - 145 mmol/L Final     Potassium   Date Value Ref Range Status   02/12/2025 3.6 3.5 - 5.1 mmol/L Final     Chloride   Date Value Ref Range Status   02/12/2025 105 95 - 110 mmol/L Final     CO2   Date Value Ref Range Status   02/12/2025 27 23 - 29 mmol/L Final     Glucose   Date Value Ref Range Status   02/12/2025 109 70 - 110 mg/dL Final     BUN   Date Value Ref Range Status   02/12/2025 36 (H) 8 - 23  mg/dL Final     Creatinine   Date Value Ref Range Status   02/25/2025 0.9 0.5 - 1.4 mg/dL Final     Calcium   Date Value Ref Range Status   02/12/2025 8.9 8.7 - 10.5 mg/dL Final     Total Protein   Date Value Ref Range Status   02/12/2025 5.5 (L) 6.0 - 8.4 g/dL Final     Albumin   Date Value Ref Range Status   02/12/2025 3.0 (L) 3.5 - 5.2 g/dL Final     Total Bilirubin   Date Value Ref Range Status   02/12/2025 0.5 0.1 - 1.0 mg/dL Final     Comment:     For infants and newborns, interpretation of results should be based  on gestational age, weight and in agreement with clinical  observations.    Premature Infant recommended reference ranges:  Up to 24 hours.............<8.0 mg/dL  Up to 48 hours............<12.0 mg/dL  3-5 days..................<15.0 mg/dL  6-29 days.................<15.0 mg/dL       Alkaline Phosphatase   Date Value Ref Range Status   02/12/2025 81 40 - 150 U/L Final     AST   Date Value Ref Range Status   02/12/2025 14 10 - 40 U/L Final     ALT   Date Value Ref Range Status   02/12/2025 18 10 - 44 U/L Final     Anion Gap   Date Value Ref Range Status   02/12/2025 10 8 - 16 mmol/L Final     eGFR   Date Value Ref Range Status   02/25/2025 >60.0 >60 mL/min/1.73 m^2 Final       ABG  POC PH   Date Value Ref Range Status   12/27/2024 7.381 7.35 - 7.45 Final     POC PO2   Date Value Ref Range Status   12/27/2024 30 (LL) 40 - 60 mmHg Final     POC PCO2   Date Value Ref Range Status   12/27/2024 56.2 (H) 35 - 45 mmHg Final           Personal Diagnostic Review  I have personally reviewed the following data and added my own interpretation as below:  CT 3/5/25 images personally reviewed and shows further reduction in hilar mass and near resolution of LLL infiltrate likely a combination of atelectasis and post XRT changes.  Oncology notes reviewed      4/15/2025     8:13 AM 4/7/2025     9:59 AM 3/28/2025     7:49 AM 3/27/2025    11:59 AM 3/14/2025     8:02 AM 3/6/2025    11:18 AM 2/26/2025     9:30 AM  "  Pulmonary Function Tests   SpO2 96 %  96 % 96 % 98 % 100 % 99 %   Height 5' 9" (1.753 m) 5' 9" (1.753 m) 5' 9" (1.753 m) 5' 9" (1.753 m) 5' 9" (1.753 m) 5' 9" (1.753 m) 5' 9" (1.753 m)   Weight 95.8 kg (211 lb 3.2 oz) 92.4 kg (203 lb 11.3 oz) 88 kg (194 lb 0.1 oz) 88.9 kg (196 lb) 89.2 kg (196 lb 10.4 oz) 92.1 kg (203 lb 0.7 oz) 90.9 kg (200 lb 6.4 oz)   BMI (Calculated) 31.2 30.1 28.6 28.9 29 30 29.6         Assessment:       1. Lower extremity edema    2. Chronic obstructive pulmonary disease with acute exacerbation    3. Adenocarcinoma, lung, left        Encounter Medications[1]  1. Lower extremity edema  - furosemide (LASIX) 20 MG tablet; Take 1 tablet (20 mg total) by mouth once daily.  Dispense: 30 tablet; Refill: 11  - Echo; Future    2. Chronic obstructive pulmonary disease with acute exacerbation  - NEBULIZER FOR HOME USE    3. Adenocarcinoma, lung, left    Plan:     Problem List Items Addressed This Visit          Pulmonary    Chronic obstructive pulmonary disease    Overview   Taking symbicort and spiriva and daliresp  Peak flow 270 l/min In April his Fev-1: 1.33 liters 47%.          Current Assessment & Plan   Significant chronic condition to be followed longitudinally with following long term treatment plan.     spirometry with severe COPD. On breztri. Not in exacerbation.  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     -Reviewed imaging in December and "satellite lesion" was clearly atelectasis and March imaging shows further improvement in post XRT changes.     -If felt needed by oncology, appropriate to re-challenge with close monitoring with PFTs and CT- if rechallenged, imaging should be reviewed by pulmonary prior to any changes in management.         Relevant Orders    NEBULIZER FOR HOME USE       Oncology    Adenocarcinoma, lung, left    Current Assessment & Plan   Completed treatment with chemo/XRT. " Brain XRT for lesionsx2. Back off immunotherapy after radiology misread of atelectasis on CT.   -discussed interactions between his treatment and COPD.  -suspect the LLL area was consolidation of prior RXT changes and not malignancy and is nearly resolved.  -No current evidence for pneumonitis,  on Dec 2024 or March 2025 imaging.         [Metastasis to brain]     Other Visit Diagnoses         Lower extremity edema    -  Primary    Relevant Medications    furosemide (LASIX) 20 MG tablet    Other Relevant Orders    Echo            Please note Overview Notes are historic documentation. Please review A/P for current updates.  No follow-ups on file.    Future Appointments   Date Time Provider Department Center   4/29/2025  1:30 PM Bienvenido Henson MD [facility] RADONC3 [address]   5/28/2025  9:00 AM LAB, HEMON CANCER BLDG [facility] LAB HO [facility]   5/28/2025 10:00 AM [facility] BCC CT1 [facility] CT MIRZA [facility]   5/29/2025 10:40 AM Jose Roberto Fields MD [facility] NEURO [facility]   5/30/2025  9:00 AM Isacc Nuñez IV, MD [facility] HEMONC3 [facility]   6/24/2025  7:15 AM [facility] OIC-MRI2 [facility] MRI IC Imaging [facility]   6/24/2025  9:30 AM Steven Campos MD [facility] NEUROSC [place] [facility]   6/24/2025  2:00 PM Bienvenido Henson MD [facility] RADONC3 [address]   7/8/2025  9:15 AM MEEKS, VISUAL FIELDS [facility] OPHTHAL [address]   7/8/2025 10:00 AM Cara Looney MD [facility] OPHTHAL [address]   7/15/2025 10:00 AM Garrett Lloyd DPM [facility] POD [address] [facility] Oroz Ward MD             [1]  Outpatient Encounter Medications as of 4/15/2025   Medication Sig Dispense Refill    albuterol (ACCUNEB) 0.63 mg/3 mL Nebu Inhale 3 mLs (0.63 mg total) by nebulization every 6 (six) hours as needed (if symptoms failed to improve with inhaler). Rescue 375 mL 11    albuterol (PROVENTIL/VENTOLIN HFA) 90 mcg/actuation inhaler Inhale 2 puffs into the lungs every 4 (four) hours as needed for Wheezing. 8.5 g 11    amitriptyline (ELAVIL) 25 MG tablet Take 1  tablet (25 mg total) by mouth once daily. 90 tablet 3    amitriptyline (ELAVIL) 25 MG tablet Take 25 mg by mouth.      aspirin (ECOTRIN) 81 MG EC tablet Take 81 mg by mouth once daily.       atorvastatin (LIPITOR) 80 MG tablet Take 1 tablet (80 mg total) by mouth in the morning. 30 tablet 0    atorvastatin (LIPITOR) 80 MG tablet Take 80 mg by mouth.      BETA-CAROTENE,A, W-C & E/MIN (OCUVITE ORAL) Take 1 capsule by mouth once daily.       budesonide-glycopyr-formoterol (BREZTRI AEROSPHERE) 160-9-4.8 mcg/actuation HFAA Inhale 2 puffs into the lungs 2 (two) times a day. 10.7 g 3    dexAMETHasone (DECADRON) 4 MG Tab Take 1 tablet (4 mg total) by mouth 2 (two) times daily. (Patient taking differently: Take 2 mg by mouth 2 (two) times daily.) 60 tablet 0    echinacea 400 mg Cap Take 1 capsule by mouth once daily.       fluticasone propionate (FLONASE) 50 mcg/actuation nasal spray Spray 2 sprays (100 mcg total) in Each Nostril once daily. 16 g 11    fluticasone propionate (FLONASE) 50 mcg/actuation nasal spray 100 mcg by Nasal route.      ibuprofen (ADVIL,MOTRIN) 600 MG tablet Take 600 mg by mouth every 8 (eight) hours as needed.      latanoprost 0.005 % ophthalmic solution Instill 1 drop into both eyes every night at bedtime 7.5 mL 4    levETIRAcetam (KEPPRA) 500 MG Tab Take 1 tablet (500 mg total) by mouth 2 (two) times daily. 60 tablet 11    losartan (COZAAR) 100 MG tablet Take 1 tablet (100 mg total) by mouth once daily. 90 tablet 3    nitroGLYCERIN (NITROSTAT) 0.4 MG SL tablet Place 1 tablet (0.4 mg total) under the tongue every 5 (five) minutes as needed. 100 tablet 3    omeprazole (PRILOSEC) 20 MG capsule Take 1 capsule (20 mg total) by mouth once daily. 90 capsule 3    omeprazole (PRILOSEC) 20 MG capsule Take 20 mg by mouth.      roflumilast (DALIRESP) 500 mcg Tab Take 1 tablet (500 mcg total) by mouth once daily. 90 tablet 3    roflumilast (DALIRESP) 500 mcg Tab Take 500 mcg by mouth.       senna (SENOKOT) 8.6 mg tablet Take 1 tablet by mouth once daily. 60 tablet 2    traZODone (DESYREL) 50 MG tablet Take 1 tablet (50 mg total) by mouth every evening. 30 tablet 5    [] cephALEXin (KEFLEX) 500 MG capsule Take 1 capsule (500 mg total) by mouth 4 (four) times daily. for 5 days 20 capsule 0    dexAMETHasone (DECADRON) 4 MG Tab Take 4 mg by mouth.      furosemide (LASIX) 20 MG tablet Take 1 tablet (20 mg total) by mouth once daily. 30 tablet 11    [] sulfamethoxazole-trimethoprim 800-160mg (BACTRIM DS) 800-160 mg Tab Take 1 tablet by mouth 2 (two) times daily. for 5 days 10 tablet 0    [DISCONTINUED] dexAMETHasone (DECADRON) 4 MG Tab Take 1 tablet (4 mg total) by mouth 2 (two) times daily. (Patient not taking: Reported on 3/14/2025) 8 tablet 0    [DISCONTINUED] furosemide (LASIX) 20 MG tablet Take 1 tablet (20 mg total) by mouth in the morning for 30 days. (Patient not taking: Reported on 3/27/2025) 30 tablet 0    [DISCONTINUED] levETIRAcetam (KEPPRA) 500 MG Tab Take 1 tablet (500 mg total) by mouth 2 (two) times daily.      [DISCONTINUED] memantine (NAMENDA) 5 MG Tab Take 1 tablet (5 mg total) by mouth 2 (two) times daily.      [DISCONTINUED] umeclidinium (INCRUSE ELLIPTA) 62.5 mcg/actuation inhalation capsule Inhale 1 puff (62.5 mcg total)  in the morning for 30 days. 90 each 0     Facility-Administered Encounter Medications as of 4/15/2025   Medication Dose Route Frequency Provider Last Rate Last Admin    dextrose 50% injection 12.5 g  12.5 g Intravenous PRN Lidia Deleon MD        dextrose 50% injection 25 g  25 g Intravenous PRN Lidia Deleon MD        insulin regular injection 0-8 Units  0-8 Units Intravenous Continuous PRN Lidia Deleon MD

## 2025-04-16 ENCOUNTER — PATIENT MESSAGE (OUTPATIENT)
Dept: ADMINISTRATIVE | Facility: OTHER | Age: 80
End: 2025-04-16
Payer: MEDICARE

## 2025-04-17 ENCOUNTER — PATIENT MESSAGE (OUTPATIENT)
Dept: ADMINISTRATIVE | Facility: OTHER | Age: 80
End: 2025-04-17
Payer: MEDICARE

## 2025-04-18 ENCOUNTER — PATIENT MESSAGE (OUTPATIENT)
Dept: ADMINISTRATIVE | Facility: OTHER | Age: 80
End: 2025-04-18
Payer: MEDICARE

## 2025-04-19 ENCOUNTER — PATIENT MESSAGE (OUTPATIENT)
Dept: ADMINISTRATIVE | Facility: OTHER | Age: 80
End: 2025-04-19
Payer: MEDICARE

## 2025-04-20 ENCOUNTER — PATIENT MESSAGE (OUTPATIENT)
Dept: ADMINISTRATIVE | Facility: OTHER | Age: 80
End: 2025-04-20
Payer: MEDICARE

## 2025-04-21 ENCOUNTER — PATIENT MESSAGE (OUTPATIENT)
Dept: ADMINISTRATIVE | Facility: OTHER | Age: 80
End: 2025-04-21
Payer: MEDICARE

## 2025-04-21 RX ORDER — ATORVASTATIN CALCIUM 80 MG/1
TABLET, FILM COATED ORAL
Qty: 90 TABLET | Refills: 3 | Status: SHIPPED | OUTPATIENT
Start: 2025-04-21

## 2025-04-21 NOTE — TELEPHONE ENCOUNTER
No care due was identified.  [facility] Embedded Care Due Messages. Reference number: [id number].   4/21/2025 8:49:09 AM CDT

## 2025-04-22 ENCOUNTER — PATIENT MESSAGE (OUTPATIENT)
Dept: ADMINISTRATIVE | Facility: OTHER | Age: 80
End: 2025-04-22
Payer: MEDICARE

## 2025-04-23 ENCOUNTER — PATIENT MESSAGE (OUTPATIENT)
Dept: ADMINISTRATIVE | Facility: OTHER | Age: 80
End: 2025-04-23
Payer: MEDICARE

## 2025-04-24 ENCOUNTER — PATIENT MESSAGE (OUTPATIENT)
Dept: ADMINISTRATIVE | Facility: OTHER | Age: 80
End: 2025-04-24
Payer: MEDICARE

## 2025-04-25 ENCOUNTER — PATIENT MESSAGE (OUTPATIENT)
Dept: ADMINISTRATIVE | Facility: OTHER | Age: 80
End: 2025-04-25
Payer: MEDICARE

## 2025-04-26 ENCOUNTER — PATIENT MESSAGE (OUTPATIENT)
Dept: ADMINISTRATIVE | Facility: OTHER | Age: 80
End: 2025-04-26
Payer: MEDICARE

## 2025-04-27 ENCOUNTER — PATIENT MESSAGE (OUTPATIENT)
Dept: ADMINISTRATIVE | Facility: OTHER | Age: 80
End: 2025-04-27
Payer: MEDICARE

## 2025-04-28 ENCOUNTER — TELEPHONE (OUTPATIENT)
Dept: RADIATION ONCOLOGY | Facility: CLINIC | Age: 80
End: 2025-04-28
Payer: MEDICARE

## 2025-04-28 ENCOUNTER — PATIENT MESSAGE (OUTPATIENT)
Dept: ADMINISTRATIVE | Facility: OTHER | Age: 80
End: 2025-04-28
Payer: MEDICARE

## 2025-04-29 ENCOUNTER — PATIENT MESSAGE (OUTPATIENT)
Dept: ADMINISTRATIVE | Facility: OTHER | Age: 80
End: 2025-04-29
Payer: MEDICARE

## 2025-04-30 ENCOUNTER — EXTERNAL CHRONIC CARE MANAGEMENT (OUTPATIENT)
Dept: PRIMARY CARE CLINIC | Facility: CLINIC | Age: 80
End: 2025-04-30
Payer: MEDICARE

## 2025-04-30 ENCOUNTER — PATIENT MESSAGE (OUTPATIENT)
Dept: ADMINISTRATIVE | Facility: OTHER | Age: 80
End: 2025-04-30
Payer: MEDICARE

## 2025-04-30 PROCEDURE — 99490 CHRNC CARE MGMT STAFF 1ST 20: CPT | Mod: PBBFAC,PO | Performed by: INTERNAL MEDICINE

## 2025-04-30 PROCEDURE — 99439 CHRNC CARE MGMT STAF EA ADDL: CPT | Mod: PBBFAC,PO | Performed by: INTERNAL MEDICINE

## 2025-05-01 ENCOUNTER — PATIENT MESSAGE (OUTPATIENT)
Dept: ADMINISTRATIVE | Facility: OTHER | Age: 80
End: 2025-05-01
Payer: MEDICARE

## 2025-05-02 ENCOUNTER — PATIENT MESSAGE (OUTPATIENT)
Dept: ADMINISTRATIVE | Facility: OTHER | Age: 80
End: 2025-05-02
Payer: MEDICARE

## 2025-05-03 ENCOUNTER — PATIENT MESSAGE (OUTPATIENT)
Dept: ADMINISTRATIVE | Facility: OTHER | Age: 80
End: 2025-05-03
Payer: MEDICARE

## 2025-05-03 DIAGNOSIS — C79.31 METASTASIS TO BRAIN: ICD-10-CM

## 2025-05-04 ENCOUNTER — PATIENT MESSAGE (OUTPATIENT)
Dept: ADMINISTRATIVE | Facility: OTHER | Age: 80
End: 2025-05-04
Payer: MEDICARE

## 2025-05-05 ENCOUNTER — OFFICE VISIT (OUTPATIENT)
Dept: URGENT CARE | Facility: CLINIC | Age: 80
End: 2025-05-05
Payer: MEDICARE

## 2025-05-05 ENCOUNTER — PATIENT MESSAGE (OUTPATIENT)
Dept: ADMINISTRATIVE | Facility: OTHER | Age: 80
End: 2025-05-05
Payer: MEDICARE

## 2025-05-05 VITALS
SYSTOLIC BLOOD PRESSURE: 128 MMHG | TEMPERATURE: 98 F | OXYGEN SATURATION: 97 % | WEIGHT: 211 LBS | BODY MASS INDEX: 31.16 KG/M2 | RESPIRATION RATE: 19 BRPM | DIASTOLIC BLOOD PRESSURE: 78 MMHG | HEART RATE: 83 BPM

## 2025-05-05 DIAGNOSIS — R10.31 RIGHT GROIN PAIN: ICD-10-CM

## 2025-05-05 DIAGNOSIS — L02.214 ABSCESS OF RIGHT GROIN: Primary | ICD-10-CM

## 2025-05-05 PROCEDURE — 87077 CULTURE AEROBIC IDENTIFY: CPT | Performed by: PHYSICIAN ASSISTANT

## 2025-05-05 PROCEDURE — 87075 CULTR BACTERIA EXCEPT BLOOD: CPT | Performed by: PHYSICIAN ASSISTANT

## 2025-05-05 RX ORDER — DEXAMETHASONE 4 MG/1
4 TABLET ORAL 2 TIMES DAILY
Qty: 60 TABLET | Refills: 0 | OUTPATIENT
Start: 2025-05-05

## 2025-05-05 RX ORDER — DOXYCYCLINE 100 MG/1
100 CAPSULE ORAL 2 TIMES DAILY
Qty: 14 CAPSULE | Refills: 0 | Status: SHIPPED | OUTPATIENT
Start: 2025-05-05 | End: 2025-05-12

## 2025-05-05 RX ORDER — MUPIROCIN 20 MG/G
OINTMENT TOPICAL 2 TIMES DAILY
Qty: 22 G | Refills: 0 | Status: SHIPPED | OUTPATIENT
Start: 2025-05-05 | End: 2025-05-12

## 2025-05-05 RX ORDER — LIDOCAINE HYDROCHLORIDE 10 MG/ML
2.1 INJECTION, SOLUTION INFILTRATION; PERINEURAL
Status: COMPLETED | OUTPATIENT
Start: 2025-05-05 | End: 2025-05-05

## 2025-05-05 RX ORDER — CEFTRIAXONE 1 G/1
1 INJECTION, POWDER, FOR SOLUTION INTRAMUSCULAR; INTRAVENOUS ONCE
Status: COMPLETED | OUTPATIENT
Start: 2025-05-05 | End: 2025-05-05

## 2025-05-05 RX ADMIN — LIDOCAINE HYDROCHLORIDE 2.1 ML: 10 INJECTION, SOLUTION INFILTRATION; PERINEURAL at 11:05

## 2025-05-05 RX ADMIN — CEFTRIAXONE 1 G: 1 INJECTION, POWDER, FOR SOLUTION INTRAMUSCULAR; INTRAVENOUS at 11:05

## 2025-05-05 NOTE — PATIENT INSTRUCTIONS
Recommend to wash area with Hibiclens twice a week.     Please keep your wound covered as it heals. Use a thin layer of antibiotic ointment (mupirocin) to help keep the wound moist. This will also keep the dressing from sticking to the wound.  you can gently wash the wound with soap and water. Pat dry and put on a clean dressing. A telfa pad will not stick to the wound.  Change your dressing once a day or every other day.  Always wash your hands before and after touching the wound.  Each time you change the dressing, look closely at the wound to be sure it is healing the right way. The wound may have a yellowish discharge, and this is normal.  Avoid picking the scab or scratching the site which may cause more irritation.  Do not soak in water or swim with an open wound.  Do not use hydrogen peroxide; it will cause the wound to dry out or delay wound healing/new skin growth.  Please complete full course of oral antibiotics.  Discussed with patient to eat a full meal before taking doxycycline to prevent nausea and vomiting. Please decrease sun exposure and do not take with dairy products.        If not allergic, take Tylenol (Acetaminophen) 650 mg to  1 g every 6 hours as needed for fever/pain and/or Motrin (Ibuprofen) 600 to 800 mg every 6 hours as needed for pain and/or fever      Please remember that you have received care at an urgent care today. Urgent cares are not emergency rooms and are not equipped to handle life threatening emergencies and cannot rule in or out certain medical conditions and you may be released before all of your medical problems are known or treated. Please arrange follow up with your primary care physician or speciality clinic  within 2-5 days if your signs and symptoms have not resolved or worsen. Patient can call our Referral Hotline at [phone] to make an appointment.    Please return here or go to the Emergency Department for any concerns or worsening of condition.Patient was  educated on signs/symptoms that would warrant emergent medical attention. Patient verbalized understanding.  Signs of infection. These include a fever of 100.4°F (38°C) or higher, chills, or wound that will not heal.  The pain in and around the area gets much worse.  There is a bad smell or pus (thick yellow, green, or gray fluid) coming from your wound.  You notice a crunchy feeling or blisters in the skin around the wound.  The redness around your wound gets bigger or is spreading up your arm or leg.  Fluid that is not pus drains from your wound.  Your swelling doesnt improve or gets worse.

## 2025-05-05 NOTE — PROGRESS NOTES
Subjective:      Patient ID: Jordin Allen Jr. is a 79 y.o. male.    Vitals:  weight is 95.7 kg (211 lb). His oral temperature is 98 °F (36.7 °C). His blood pressure is 128/78 and his pulse is 83. His respiration is 19 and oxygen saturation is 97%.     Chief Complaint: Drainage from Incision    Jordin Allen Jr. is a 79 y.o. male with COPD/emphysema, hypertension,adenocarcinoma who complains of right groin abscess draining for 2 days. Pt states it was drained on 3/27/25, completed antibiotics (bactrim) and he thought it was over but it's started draining again.  Pt reports pain to area. States he had this issue 3-4 years ago with boils to scrotum.  Pt reports urinary frequency due to lasix use.     Abscess  Chronicity:  NewProgression Since Onset: rapidly worsening  Location:  Ano-genital (groin)  Associated Symptoms: no fever, no chills, no sweats  Characteristics: draining, painful, redness and swelling    Pain Scale:  3/10  Treatments Tried:  Draining/squeezing  Relieved by:  Draining/squeezing  Worsened by:  Nothing      Constitution: Negative for chills, fatigue, fever and generalized weakness.   Genitourinary:  Positive for frequency and genital sore. Negative for dysuria, urgency, flank pain, genital trauma, penile discharge, painful ejaculation, penile pain, penile swelling, scrotal swelling, testicular pain and pelvic pain.   Musculoskeletal:  Positive for pain and pain with walking.   Skin:  Positive for lesion, skin thickening/induration, erythema and abscess.      Objective:     Physical Exam   Constitutional: He is oriented to person, place, and time.      Comments:Patient is awake and alert, sitting up in exam chair, speaking and answering in complete sentences     normal  HENT:   Head: Normocephalic and atraumatic.   Ears:   Right Ear: External ear normal.   Left Ear: External ear normal.   Nose: Nose normal.   Eyes: Conjunctivae are normal. Extraocular movement intact   Neck: Neck supple.    Pulmonary/Chest: Effort normal.   Abdominal: Normal appearance.   Neurological: He is alert and oriented to person, place, and time.   Skin: Skin is warm. Capillary refill takes less than 2 seconds. erythema and lesion         Comments: tender, fluctuant, erythematous nodule with surrounding erythema to right inguinal area/groin       Psychiatric: His behavior is normal.   Nursing note and vitals reviewed.          Assessment:     1. Abscess of right groin    2. Right groin pain      Patient presents with clinical exam findings and history consistent with above.      On exam, patient is nontoxic appearing and vitals are stable.        Diagnostic testing results were reviewed and discussed with patient/guardian.   Tests ordered in clinic: None  Previous progress notes/admissions/labs and medications were reviewed.  Reviewed GFR > 60 from CMP on 2/20/25.    Plan:   Recommend to wash area with Hibiclens twice a week.   Patient instructed to return to clinic If symptoms do not improve    Abscess of right groin  -     Aerobic culture  -     Culture, Anaerobic  -     cefTRIAXone injection 1 g  -     LIDOcaine HCL 10 mg/ml (1%) injection 2.1 mL  -     doxycycline (VIBRAMYCIN) 100 MG Cap; Take 1 capsule (100 mg total) by mouth 2 (two) times daily. for 7 days  Dispense: 14 capsule; Refill: 0  -     mupirocin (BACTROBAN) 2 % ointment; Apply topically 2 (two) times daily. for 7 days  Dispense: 22 g; Refill: 0  -     Ambulatory referral/consult to Dermatology  -     Incision & Drainage    Right groin pain  -     doxycycline (VIBRAMYCIN) 100 MG Cap; Take 1 capsule (100 mg total) by mouth 2 (two) times daily. for 7 days  Dispense: 14 capsule; Refill: 0  -     mupirocin (BACTROBAN) 2 % ointment; Apply topically 2 (two) times daily. for 7 days  Dispense: 22 g; Refill: 0      Incision & Drainage    Date/Time: 5/5/2025 9:15 AM    Performed by: Yari Mensah PA-C  Authorized by: Yari Mensah PA-C    Consent Done?:  Yes  "(Verbal)    Type:  Abscess  Body area:  Anogenital (right inguinal area/groin)  Local anesthetic: Lidocaine 1% without epinephrine  Anesthetic total (ml):  5  Risk factor:  Underlying major vessel  Scalpel size:  10  Incision type:  Single straight  Incision depth: dermal    Complexity:  Complex  Drainage:  Purulent and bloody  Drainage amount:  Copious  Wound treatment:  Incision, drainage, wound left open and deloculation  Packing material:  None  Patient tolerance:  Patient tolerated the procedure well with no immediate complications  Pain Assessment: 2    Wound was irrigated with saline flushes, compression dressing, gauze, nonstick pad. Reviewed instructions as printed in AVS summary.                  1) See orders for this visit as documented in the electronic medical record.  2) Symptomatic therapy suggested: use acetaminophen/ibuprofen every 6-8 hours prn pain or fever, push fluids.   3) Call or return to clinic prn if these symptoms worsen or fail to improve as anticipated.    Discussed results/diagnosis/plan with patient in clinic.  We had shared decision making for patient's treatment. Patient verbalized understanding and in agreement with current treatment plan.     Patient was instructed to return for re-evaluation with urgent care or PCP for continued outpatient workup and management if symptoms do not improve/worsening symptoms. Strict ED versus clinic precautions given in depth.    Discharge and follow-up instructions given verbally/printed with the patient who expressed understanding. The instructions and results are also available on [facility].              Yari "Brina" POOJA Mensah          Patient Instructions   Recommend to wash area with Hibiclens twice a week.     Please keep your wound covered as it heals. Use a thin layer of antibiotic ointment (mupirocin) to help keep the wound moist. This will also keep the dressing from sticking to the wound.  you can gently wash the wound with soap and " water. Pat dry and put on a clean dressing. A telfa pad will not stick to the wound.  Change your dressing once a day or every other day.  Always wash your hands before and after touching the wound.  Each time you change the dressing, look closely at the wound to be sure it is healing the right way. The wound may have a yellowish discharge, and this is normal.  Avoid picking the scab or scratching the site which may cause more irritation.  Do not soak in water or swim with an open wound.  Do not use hydrogen peroxide; it will cause the wound to dry out or delay wound healing/new skin growth.  Please complete full course of oral antibiotics.  Discussed with patient to eat a full meal before taking doxycycline to prevent nausea and vomiting. Please decrease sun exposure and do not take with dairy products.        If not allergic, take Tylenol (Acetaminophen) 650 mg to  1 g every 6 hours as needed for fever/pain and/or Motrin (Ibuprofen) 600 to 800 mg every 6 hours as needed for pain and/or fever      Please remember that you have received care at an urgent care today. Urgent cares are not emergency rooms and are not equipped to handle life threatening emergencies and cannot rule in or out certain medical conditions and you may be released before all of your medical problems are known or treated. Please arrange follow up with your primary care physician or speciality clinic  within 2-5 days if your signs and symptoms have not resolved or worsen. Patient can call our Referral Hotline at [phone] to make an appointment.    Please return here or go to the Emergency Department for any concerns or worsening of condition.Patient was educated on signs/symptoms that would warrant emergent medical attention. Patient verbalized understanding.  Signs of infection. These include a fever of 100.4°F (38°C) or higher, chills, or wound that will not heal.  The pain in and around the area gets much worse.  There is a bad smell or  pus (thick yellow, green, or gray fluid) coming from your wound.  You notice a crunchy feeling or blisters in the skin around the wound.  The redness around your wound gets bigger or is spreading up your arm or leg.  Fluid that is not pus drains from your wound.  Your swelling doesnt improve or gets worse.

## 2025-05-05 NOTE — PROCEDURES
Incision & Drainage    Date/Time: 5/5/2025 9:15 AM    Performed by: Yari Mensah PA-C  Authorized by: Yari Mensah PA-C    Consent Done?:  Yes (Verbal)    Type:  Abscess  Body area:  Anogenital (right inguinal area/groin)  Local anesthetic: Lidocaine 1% without epinephrine  Anesthetic total (ml):  5  Risk factor:  Underlying major vessel  Scalpel size:  10  Incision type:  Single straight  Incision depth: dermal    Complexity:  Complex  Drainage:  Purulent and bloody  Drainage amount:  Copious  Wound treatment:  Incision, drainage, wound left open and deloculation  Packing material:  None  Patient tolerance:  Patient tolerated the procedure well with no immediate complications  Pain Assessment: 2    Wound was irrigated with saline flushes, compression dressing, gauze, nonstick pad. Reviewed instructions as printed in AVS summary.

## 2025-05-05 NOTE — LETTER
"  May 5, 2025      [facility]  [address] [address]  Phone: [phone]  Fax: [phone]       Patient: Jordin Allen   YOB: 1945  Date of Visit: 05/05/2025    To Whom It May Concern:    Geoff Allen  was at [facility] on 05/05/2025. The patient may return to work/school on 5/6/25 with no restrictions. If you have any questions or concerns, or if I can be of further assistance, please do not hesitate to contact me.    Sincerely,          Yarichery Mensah PA-C (Jackie)       "

## 2025-05-06 ENCOUNTER — PATIENT MESSAGE (OUTPATIENT)
Dept: ADMINISTRATIVE | Facility: OTHER | Age: 80
End: 2025-05-06
Payer: MEDICARE

## 2025-05-07 ENCOUNTER — PATIENT MESSAGE (OUTPATIENT)
Dept: ADMINISTRATIVE | Facility: OTHER | Age: 80
End: 2025-05-07
Payer: MEDICARE

## 2025-05-08 ENCOUNTER — PATIENT MESSAGE (OUTPATIENT)
Dept: ADMINISTRATIVE | Facility: OTHER | Age: 80
End: 2025-05-08
Payer: MEDICARE

## 2025-05-08 LAB
BACTERIA SPEC AEROBE CULT: ABNORMAL
BACTERIA SPEC ANAEROBE CULT: ABNORMAL

## 2025-05-09 ENCOUNTER — PATIENT MESSAGE (OUTPATIENT)
Dept: ADMINISTRATIVE | Facility: OTHER | Age: 80
End: 2025-05-09
Payer: MEDICARE

## 2025-05-10 ENCOUNTER — RESULTS FOLLOW-UP (OUTPATIENT)
Dept: URGENT CARE | Facility: CLINIC | Age: 80
End: 2025-05-10

## 2025-05-11 ENCOUNTER — PATIENT MESSAGE (OUTPATIENT)
Dept: ADMINISTRATIVE | Facility: OTHER | Age: 80
End: 2025-05-11
Payer: MEDICARE

## 2025-05-12 ENCOUNTER — CLINICAL SUPPORT (OUTPATIENT)
Dept: AUDIOLOGY | Facility: CLINIC | Age: 80
End: 2025-05-12
Payer: MEDICARE

## 2025-05-12 ENCOUNTER — OFFICE VISIT (OUTPATIENT)
Dept: OTOLARYNGOLOGY | Facility: CLINIC | Age: 80
End: 2025-05-12
Payer: MEDICARE

## 2025-05-12 ENCOUNTER — PATIENT MESSAGE (OUTPATIENT)
Dept: ADMINISTRATIVE | Facility: OTHER | Age: 80
End: 2025-05-12
Payer: MEDICARE

## 2025-05-12 DIAGNOSIS — H90.6 MIXED CONDUCTIVE AND SENSORINEURAL HEARING LOSS, BILATERAL: Primary | ICD-10-CM

## 2025-05-12 DIAGNOSIS — H91.13 PRESBYCUSIS OF BOTH EARS: ICD-10-CM

## 2025-05-12 DIAGNOSIS — H65.92 LEFT OTITIS MEDIA WITH EFFUSION: Primary | ICD-10-CM

## 2025-05-12 PROCEDURE — 69433 CREATE EARDRUM OPENING: CPT | Mod: S$PBB,LT,, | Performed by: OTOLARYNGOLOGY

## 2025-05-12 PROCEDURE — 69433 CREATE EARDRUM OPENING: CPT | Mod: PBBFAC | Performed by: OTOLARYNGOLOGY

## 2025-05-12 PROCEDURE — 99999 PR PBB SHADOW E&M-EST. PATIENT-LVL III: CPT | Mod: PBBFAC,,, | Performed by: OTOLARYNGOLOGY

## 2025-05-12 PROCEDURE — 92557 COMPREHENSIVE HEARING TEST: CPT | Mod: PBBFAC

## 2025-05-12 PROCEDURE — 99211 OFF/OP EST MAY X REQ PHY/QHP: CPT | Mod: PBBFAC

## 2025-05-12 PROCEDURE — 99213 OFFICE O/P EST LOW 20 MIN: CPT | Mod: 25,S$PBB,, | Performed by: OTOLARYNGOLOGY

## 2025-05-12 PROCEDURE — 99999 PR PBB SHADOW E&M-EST. PATIENT-LVL I: CPT | Mod: PBBFAC,,,

## 2025-05-12 PROCEDURE — 92567 TYMPANOMETRY: CPT | Mod: PBBFAC

## 2025-05-12 PROCEDURE — 99213 OFFICE O/P EST LOW 20 MIN: CPT | Mod: PBBFAC,27 | Performed by: OTOLARYNGOLOGY

## 2025-05-12 NOTE — PROGRESS NOTES
Subjective:      Jordin Allen Jr. is a 79 y.o. male who was self-referred for hearing loss.     He denies ear pain or drainage. He does have a history of PE tubes bilaterally.       L ear worse last mo.    Wants PE Tube.        Past Medical History  He has a past medical history of Benign neoplasm of colon, Bronchitis chronic, CAD (coronary artery disease), COPD (chronic obstructive pulmonary disease), Emphysema of lung, Encounter for preventive health examination, GERD (gastroesophageal reflux disease), colonic polyps, Hypertension, NAFLD (nonalcoholic fatty liver disease), SHOLA on CPAP, RLS (restless legs syndrome), and Screen for colon cancer.    Past Surgical History  He has a past surgical history that includes Cataract extraction, bilateral; bilateral foot surgery (1993); Colon surgery (2013); Cardiac catheterization (2013); Colonoscopy (N/A, 3/21/2018); scrotal abscess removal; Colonoscopy (N/A, 4/12/2019); Colonoscopy (N/A, 5/31/2022); and Endobronchial ultrasound (Bilateral, 4/30/2024).    Family History  His family history includes Heart attack in his mother; Heart disease in his mother; Hypertension in his mother; No Known Problems in his daughter and sister.    Social History  He reports that he quit smoking about 12 years ago. His smoking use included cigarettes. He started smoking about 52 years ago. He has a 40 pack-year smoking history. He has never been exposed to tobacco smoke. He has never used smokeless tobacco. He reports current alcohol use of about 3.0 standard drinks of alcohol per week. He reports that he does not use drugs.    Allergies  He is allergic to brilinta [ticagrelor], lisinopril, and metoprolol succinate.    Medications  He has a current medication list which includes the following prescription(s): albuterol, albuterol, amitriptyline, amitriptyline, aspirin, atorvastatin, beta-carotene(a)-vits c,e/mins, breztri aerosphere, dexamethasone, dexamethasone, doxycycline, echinacea,  fluticasone propionate, fluticasone propionate, furosemide, ibuprofen, latanoprost, levetiracetam, losartan, mupirocin, nitroglycerin, omeprazole, omeprazole, roflumilast, roflumilast, senna, and trazodone, and the following Facility-Administered Medications: dextrose 50%, dextrose 50%, and insulin regular.    Review of Systems   Constitutional: Negative.  Negative for chills, fever and unexpected weight change.   HENT:  Positive for hearing loss, postnasal drip and tinnitus. Negative for ear discharge, ear pain, sore throat and trouble swallowing.    Eyes: Negative.    Respiratory:  Positive for cough, shortness of breath and wheezing.    Cardiovascular: Negative.    Gastrointestinal: Negative.    Endocrine: Negative.    Genitourinary: Negative.    Musculoskeletal: Negative.    Skin: Negative.    Allergic/Immunologic: Negative.    Neurological: Negative.  Negative for dizziness and headaches.   Hematological: Negative.    Psychiatric/Behavioral: Negative.  Negative for agitation and sleep disturbance. The patient is not nervous/anxious.           Objective:     There were no vitals taken for this visit.     Constitutional:   He appears well-developed and well-nourished.     Head:  Normocephalic and atraumatic.     Ears:    Left Ear: A middle ear effusion is present.   Ears:    Procedure note:    The patient was brought to the minor procedure room and placed in the supine position. The operating video microscope was brought on to the field and the right ear canal, tympanic membrane and other structures were visualized and shown the the patient and family. The patient tolerated the procedure well and there were no complications.    Procedure note:    The patient was brought to the minor procedure room and placed in the supine position. The video microscope was brought onto the field. The ear canal, tympanic membrane and other structures were visualized and demonstrated to the patient and family. The patient tolerated  the procedure well and there were no complications.        Procedure    None      Data Reviewed    WBC (K/uL)   Date Value   02/12/2025 10.55     Platelets (K/uL)   Date Value   02/12/2025 192      Creatinine (mg/dL)   Date Value   02/25/2025 0.9     TSH (uIU/mL)   Date Value   02/07/2025 0.316 (L)     Glucose (mg/dL)   Date Value   02/12/2025 109     Hemoglobin A1C (%)   Date Value   03/06/2024 5.7 (H)                Assessment:     1. Left otitis media with effusion    2. Presbycusis of both ears    2. Retained PET AS/ asymptomatic     Plan:     After informed consent regarding infection, perforation, early extrusion and possible cholesteatoma formation the patient was brought to the minor procedure room and placed in the supine position. The operating microscope was then brought onto the field and the left tympanic membrane was visualized. Using a sterile, single use phenol kit the TM was sterilized and anesthetized. A myringotomy incision was made and the effusion evacuated. A sterile Mann V tympanostomy tube was placed and the procedure was terminated. The patient tolerated the procedure well.      Water precautions discussed. RTC as directed.        F/U 3 mos

## 2025-05-12 NOTE — PROGRESS NOTES
5/12/2025    Audiologic Evaluation    Jordin Allen Jr. was seen today in the clinic for an audiologic evaluation following cerumen removal in clinic with ENT.  Patient's main complaint was decreased hearing, bilaterally. Mr. Allen reported significant changes in his hearing since his last audiogram. He reported occasional tinnitus still present in the left ear. Mr. Allen reported difficulty hearing, even when wearing his hearing aids. A clean and check of Mr. Allen's hearing aids was completed, domes cleaning, and filters verified as clear. Mr. Allen denied otalgia, aural fullness, and true vertigo.    Tympanometry revealed Type C in the right ear and Type C in the left ear.     Audiogram results revealed a moderate to severe mixed hearing loss in the right ear and a moderate to profound mixed hearing loss in the left ear.      Speech reception thresholds were noted at 50 dBHL in the right ear and 65 dBHL in the left ear.    Speech discrimination scores were 84% in the right ear and 76% in the left ear.    Recommendations:  Otologic evaluation  Daily and consistent use of amplification  Hearing aid follow-up following medical clearance  Annual audiogram or sooner if change perceived  Hearing protection in noise

## 2025-05-13 ENCOUNTER — PATIENT MESSAGE (OUTPATIENT)
Dept: ADMINISTRATIVE | Facility: OTHER | Age: 80
End: 2025-05-13
Payer: MEDICARE

## 2025-05-13 ENCOUNTER — PATIENT MESSAGE (OUTPATIENT)
Dept: HEMATOLOGY/ONCOLOGY | Facility: CLINIC | Age: 80
End: 2025-05-13
Payer: MEDICARE

## 2025-05-13 ENCOUNTER — PATIENT MESSAGE (OUTPATIENT)
Dept: RADIATION ONCOLOGY | Facility: CLINIC | Age: 80
End: 2025-05-13
Payer: MEDICARE

## 2025-05-13 ENCOUNTER — PATIENT MESSAGE (OUTPATIENT)
Dept: INTERNAL MEDICINE | Facility: CLINIC | Age: 80
End: 2025-05-13
Payer: MEDICARE

## 2025-05-14 ENCOUNTER — HOSPITAL ENCOUNTER (INPATIENT)
Facility: HOSPITAL | Age: 80
LOS: 1 days | Discharge: HOME HEALTH | DRG: 561 | End: 2025-05-16
Attending: EMERGENCY MEDICINE
Payer: MEDICARE

## 2025-05-14 DIAGNOSIS — R60.0 LOWER EXTREMITY EDEMA: ICD-10-CM

## 2025-05-14 DIAGNOSIS — M54.50 LUMBAR BACK PAIN: Primary | ICD-10-CM

## 2025-05-14 DIAGNOSIS — S32.010A CLOSED COMPRESSION FRACTURE OF BODY OF L1 VERTEBRA: ICD-10-CM

## 2025-05-14 DIAGNOSIS — R60.0 PEDAL EDEMA: ICD-10-CM

## 2025-05-14 DIAGNOSIS — R07.9 CHEST PAIN: ICD-10-CM

## 2025-05-14 PROBLEM — S32.010D COMPRESSION FRACTURE OF L1 VERTEBRA WITH ROUTINE HEALING: Status: ACTIVE | Noted: 2025-05-14

## 2025-05-14 LAB
ABSOLUTE EOSINOPHIL (OHS): 0.03 K/UL
ABSOLUTE MONOCYTE (OHS): 0.64 K/UL (ref 0.3–1)
ABSOLUTE NEUTROPHIL COUNT (OHS): 5.24 K/UL (ref 1.8–7.7)
ALBUMIN SERPL BCP-MCNC: 2.8 G/DL (ref 3.5–5.2)
ALP SERPL-CCNC: 79 UNIT/L (ref 40–150)
ALT SERPL W/O P-5'-P-CCNC: 25 UNIT/L (ref 10–44)
ANION GAP (OHS): 7 MMOL/L (ref 8–16)
AST SERPL-CCNC: 16 UNIT/L (ref 11–45)
BASOPHILS # BLD AUTO: 0.01 K/UL
BASOPHILS NFR BLD AUTO: 0.1 %
BILIRUB SERPL-MCNC: 0.7 MG/DL (ref 0.1–1)
BNP SERPL-MCNC: 46 PG/ML (ref 0–99)
BUN SERPL-MCNC: 40 MG/DL (ref 8–23)
CALCIUM SERPL-MCNC: 8.5 MG/DL (ref 8.7–10.5)
CHLORIDE SERPL-SCNC: 105 MMOL/L (ref 95–110)
CO2 SERPL-SCNC: 31 MMOL/L (ref 23–29)
CREAT SERPL-MCNC: 1 MG/DL (ref 0.5–1.4)
ERYTHROCYTE [DISTWIDTH] IN BLOOD BY AUTOMATED COUNT: 17.4 % (ref 11.5–14.5)
GFR SERPLBLD CREATININE-BSD FMLA CKD-EPI: >60 ML/MIN/1.73/M2
GLUCOSE SERPL-MCNC: 80 MG/DL (ref 70–110)
HCT VFR BLD AUTO: 41.6 % (ref 40–54)
HGB BLD-MCNC: 12.9 GM/DL (ref 14–18)
IMM GRANULOCYTES # BLD AUTO: 0.31 K/UL (ref 0–0.04)
IMM GRANULOCYTES NFR BLD AUTO: 4.4 % (ref 0–0.5)
LYMPHOCYTES # BLD AUTO: 0.84 K/UL (ref 1–4.8)
MCH RBC QN AUTO: 28 PG (ref 27–31)
MCHC RBC AUTO-ENTMCNC: 31 G/DL (ref 32–36)
MCV RBC AUTO: 90 FL (ref 82–98)
NUCLEATED RBC (/100WBC) (OHS): 0 /100 WBC
PLATELET # BLD AUTO: 102 K/UL (ref 150–450)
PMV BLD AUTO: 10.3 FL (ref 9.2–12.9)
POTASSIUM SERPL-SCNC: 4.1 MMOL/L (ref 3.5–5.1)
PROT SERPL-MCNC: 5.6 GM/DL (ref 6–8.4)
RBC # BLD AUTO: 4.6 M/UL (ref 4.6–6.2)
RELATIVE EOSINOPHIL (OHS): 0.4 %
RELATIVE LYMPHOCYTE (OHS): 11.9 % (ref 18–48)
RELATIVE MONOCYTE (OHS): 9.1 % (ref 4–15)
RELATIVE NEUTROPHIL (OHS): 74.1 % (ref 38–73)
SODIUM SERPL-SCNC: 143 MMOL/L (ref 136–145)
TROPONIN I SERPL HS-MCNC: 10 NG/L
TROPONIN I SERPL HS-MCNC: 7 NG/L
WBC # BLD AUTO: 7.07 K/UL (ref 3.9–12.7)

## 2025-05-14 PROCEDURE — A9585 GADOBUTROL INJECTION: HCPCS | Performed by: STUDENT IN AN ORGANIZED HEALTH CARE EDUCATION/TRAINING PROGRAM

## 2025-05-14 PROCEDURE — 63600175 PHARM REV CODE 636 W HCPCS

## 2025-05-14 PROCEDURE — 84484 ASSAY OF TROPONIN QUANT: CPT | Performed by: EMERGENCY MEDICINE

## 2025-05-14 PROCEDURE — 63600175 PHARM REV CODE 636 W HCPCS: Performed by: EMERGENCY MEDICINE

## 2025-05-14 PROCEDURE — 63600175 PHARM REV CODE 636 W HCPCS: Performed by: STUDENT IN AN ORGANIZED HEALTH CARE EDUCATION/TRAINING PROGRAM

## 2025-05-14 PROCEDURE — 84484 ASSAY OF TROPONIN QUANT: CPT | Performed by: STUDENT IN AN ORGANIZED HEALTH CARE EDUCATION/TRAINING PROGRAM

## 2025-05-14 PROCEDURE — 25000003 PHARM REV CODE 250: Performed by: STUDENT IN AN ORGANIZED HEALTH CARE EDUCATION/TRAINING PROGRAM

## 2025-05-14 PROCEDURE — 82040 ASSAY OF SERUM ALBUMIN: CPT | Performed by: EMERGENCY MEDICINE

## 2025-05-14 PROCEDURE — 25500020 PHARM REV CODE 255: Performed by: STUDENT IN AN ORGANIZED HEALTH CARE EDUCATION/TRAINING PROGRAM

## 2025-05-14 PROCEDURE — 85025 COMPLETE CBC W/AUTO DIFF WBC: CPT | Performed by: EMERGENCY MEDICINE

## 2025-05-14 PROCEDURE — 96376 TX/PRO/DX INJ SAME DRUG ADON: CPT

## 2025-05-14 PROCEDURE — 99285 EMERGENCY DEPT VISIT HI MDM: CPT | Mod: 25

## 2025-05-14 PROCEDURE — 99223 1ST HOSP IP/OBS HIGH 75: CPT | Mod: ,,, | Performed by: PHYSICIAN ASSISTANT

## 2025-05-14 PROCEDURE — 96374 THER/PROPH/DIAG INJ IV PUSH: CPT

## 2025-05-14 PROCEDURE — 83880 ASSAY OF NATRIURETIC PEPTIDE: CPT | Performed by: EMERGENCY MEDICINE

## 2025-05-14 RX ORDER — MORPHINE SULFATE 4 MG/ML
4 INJECTION, SOLUTION INTRAMUSCULAR; INTRAVENOUS
Refills: 0 | Status: COMPLETED | OUTPATIENT
Start: 2025-05-14 | End: 2025-05-14

## 2025-05-14 RX ORDER — GADOBUTROL 604.72 MG/ML
10 INJECTION INTRAVENOUS
Status: COMPLETED | OUTPATIENT
Start: 2025-05-14 | End: 2025-05-14

## 2025-05-14 RX ORDER — OXYCODONE HYDROCHLORIDE 5 MG/1
5 TABLET ORAL EVERY 4 HOURS PRN
Qty: 18 TABLET | Refills: 0 | Status: SHIPPED | OUTPATIENT
Start: 2025-05-14 | End: 2025-05-16

## 2025-05-14 RX ORDER — IBUPROFEN 600 MG/1
600 TABLET, FILM COATED ORAL
Status: COMPLETED | OUTPATIENT
Start: 2025-05-14 | End: 2025-05-14

## 2025-05-14 RX ORDER — LIDOCAINE 50 MG/G
1 PATCH TOPICAL DAILY
Qty: 10 PATCH | Refills: 0 | Status: CANCELLED | OUTPATIENT
Start: 2025-05-14

## 2025-05-14 RX ORDER — METHOCARBAMOL 500 MG/1
1000 TABLET, FILM COATED ORAL 3 TIMES DAILY
Qty: 42 TABLET | Refills: 0 | Status: CANCELLED | OUTPATIENT
Start: 2025-05-14 | End: 2025-05-21

## 2025-05-14 RX ORDER — ACETAMINOPHEN 325 MG/1
650 TABLET ORAL
Status: COMPLETED | OUTPATIENT
Start: 2025-05-14 | End: 2025-05-14

## 2025-05-14 RX ORDER — LOSARTAN POTASSIUM 50 MG/1
100 TABLET ORAL
Status: COMPLETED | OUTPATIENT
Start: 2025-05-14 | End: 2025-05-14

## 2025-05-14 RX ORDER — OXYCODONE HYDROCHLORIDE 5 MG/1
5 TABLET ORAL
Refills: 0 | Status: COMPLETED | OUTPATIENT
Start: 2025-05-14 | End: 2025-05-15

## 2025-05-14 RX ADMIN — ACETAMINOPHEN 650 MG: 325 TABLET ORAL at 09:05

## 2025-05-14 RX ADMIN — MORPHINE SULFATE 4 MG: 4 INJECTION INTRAVENOUS at 06:05

## 2025-05-14 RX ADMIN — IBUPROFEN 600 MG: 600 TABLET ORAL at 09:05

## 2025-05-14 RX ADMIN — MORPHINE SULFATE 4 MG: 4 INJECTION INTRAVENOUS at 07:05

## 2025-05-14 RX ADMIN — GADOBUTROL 10 ML: 604.72 INJECTION INTRAVENOUS at 05:05

## 2025-05-14 RX ADMIN — LOSARTAN POTASSIUM 100 MG: 50 TABLET, FILM COATED ORAL at 10:05

## 2025-05-15 ENCOUNTER — TELEPHONE (OUTPATIENT)
Dept: NEUROSURGERY | Facility: CLINIC | Age: 80
End: 2025-05-15
Payer: MEDICARE

## 2025-05-15 DIAGNOSIS — S32.010D COMPRESSION FRACTURE OF L1 VERTEBRA WITH ROUTINE HEALING: Primary | ICD-10-CM

## 2025-05-15 PROBLEM — Z74.09 OTHER REDUCED MOBILITY: Status: ACTIVE | Noted: 2025-05-15

## 2025-05-15 PROBLEM — S90.812A ABRASION, LEFT FOOT, INITIAL ENCOUNTER: Status: ACTIVE | Noted: 2025-05-15

## 2025-05-15 PROBLEM — R60.0 BILATERAL LOWER EXTREMITY EDEMA: Status: ACTIVE | Noted: 2025-05-15

## 2025-05-15 LAB
AORTIC SIZE INDEX (SOV): 1.5 CM/M2
AV AREA BY CONTINUOUS VTI: 2.9 CM2
AV INDEX (PROSTH): 0.88
AV LVOT MEAN GRADIENT: 1 MMHG
AV LVOT PEAK GRADIENT: 3 MMHG
AV MEAN GRADIENT: 3 MMHG
AV PEAK GRADIENT: 4 MMHG
AV VALVE AREA BY VELOCITY RATIO: 2.8 CM²
AV VALVE AREA: 3 CM2
AV VELOCITY RATIO: 0.8
BILIRUB UR QL STRIP.AUTO: NEGATIVE
BSA FOR ECHO PROCEDURE: 2.05 M2
CLARITY UR: CLEAR
COLOR UR AUTO: YELLOW
CV ECHO LV RWT: 0.37 CM
DOP CALC AO PEAK VEL: 1 M/S
DOP CALC AO VTI: 18 CM
DOP CALC LVOT AREA: 3.5 CM2
DOP CALC LVOT DIAMETER: 2.1 CM
DOP CALC LVOT PEAK VEL: 0.8 M/S
DOP CALC LVOT STROKE VOLUME: 54.7 CM3
DOP CALCLVOT PEAK VEL VTI: 15.8 CM
E WAVE DECELERATION TIME: 183 MS
E/A RATIO: 0.62
E/E' RATIO: 7 M/S
ECHO EF ESTIMATED: 50 %
ECHO LV POSTERIOR WALL: 0.8 CM (ref 0.6–1.1)
EJECTION FRACTION: 55 %
FRACTIONAL SHORTENING: 25.6 % (ref 28–44)
GLUCOSE UR QL STRIP: NEGATIVE
HGB UR QL STRIP: ABNORMAL
HOLD SPECIMEN: NORMAL
INTERVENTRICULAR SEPTUM: 0.8 CM (ref 0.6–1.1)
IVC DIAMETER: 1.42 CM
KETONES UR QL STRIP: NEGATIVE
LA MAJOR: 4.7 CM
LA WIDTH: 3.3 CM
LEFT ATRIUM SIZE: 2.1 CM
LEFT INTERNAL DIMENSION IN SYSTOLE: 3.2 CM (ref 2.1–4)
LEFT VENTRICLE DIASTOLIC VOLUME INDEX: 41.58 ML/M2
LEFT VENTRICLE DIASTOLIC VOLUME: 84 ML
LEFT VENTRICLE MASS INDEX: 52.1 G/M2
LEFT VENTRICLE SYSTOLIC VOLUME INDEX: 20.8 ML/M2
LEFT VENTRICLE SYSTOLIC VOLUME: 42 ML
LEFT VENTRICULAR INTERNAL DIMENSION IN DIASTOLE: 4.3 CM (ref 3.5–6)
LEFT VENTRICULAR MASS: 105.3 G
LEUKOCYTE ESTERASE UR QL STRIP: NEGATIVE
LV LATERAL E/E' RATIO: 6.2
LV SEPTAL E/E' RATIO: 9.3
MICROSCOPIC COMMENT: ABNORMAL
MV PEAK A VEL: 0.91 M/S
MV PEAK E VEL: 0.56 M/S
NITRITE UR QL STRIP: NEGATIVE
OHS CV RV/LV RATIO: 0.7 CM
PH UR STRIP: 7 [PH]
POCT GLUCOSE: 128 MG/DL (ref 70–110)
POCT GLUCOSE: 131 MG/DL (ref 70–110)
PROT UR QL STRIP: ABNORMAL
RA MAJOR: 3.85 CM
RA PRESSURE ESTIMATED: 3 MMHG
RA WIDTH: 2.48 CM
RBC #/AREA URNS AUTO: 9 /HPF (ref 0–4)
RIGHT ATRIAL AREA: 9.8 CM2
RIGHT VENTRICLE DIASTOLIC BASEL DIMENSION: 3 CM
RV TISSUE DOPPLER FREE WALL SYSTOLIC VELOCITY 1 (APICAL 4 CHAMBER VIEW): 14.14 CM/S
SINUS: 2.95 CM
SP GR UR STRIP: 1.01
TDI LATERAL: 0.09 M/S
TDI SEPTAL: 0.06 M/S
TDI: 0.08 M/S
TRICUSPID ANNULAR PLANE SYSTOLIC EXCURSION: 2.1 CM
UROBILINOGEN UR STRIP-ACNC: NEGATIVE EU/DL
WBC #/AREA URNS AUTO: <1 /HPF (ref 0–5)
Z-SCORE OF LEFT VENTRICULAR DIMENSION IN END DIASTOLE: -3.25
Z-SCORE OF LEFT VENTRICULAR DIMENSION IN END SYSTOLE: -1.04

## 2025-05-15 PROCEDURE — 25000003 PHARM REV CODE 250

## 2025-05-15 PROCEDURE — 12000002 HC ACUTE/MED SURGE SEMI-PRIVATE ROOM

## 2025-05-15 PROCEDURE — 63600175 PHARM REV CODE 636 W HCPCS: Performed by: EMERGENCY MEDICINE

## 2025-05-15 PROCEDURE — 63600175 PHARM REV CODE 636 W HCPCS: Mod: JZ,TB

## 2025-05-15 PROCEDURE — 25000003 PHARM REV CODE 250: Performed by: STUDENT IN AN ORGANIZED HEALTH CARE EDUCATION/TRAINING PROGRAM

## 2025-05-15 PROCEDURE — 25000242 PHARM REV CODE 250 ALT 637 W/ HCPCS

## 2025-05-15 PROCEDURE — 81003 URINALYSIS AUTO W/O SCOPE: CPT

## 2025-05-15 PROCEDURE — 25500020 PHARM REV CODE 255: Performed by: INTERNAL MEDICINE

## 2025-05-15 RX ORDER — LOSARTAN POTASSIUM 50 MG/1
100 TABLET ORAL DAILY
Status: DISCONTINUED | OUTPATIENT
Start: 2025-05-15 | End: 2025-05-17 | Stop reason: HOSPADM

## 2025-05-15 RX ORDER — FUROSEMIDE 40 MG/1
40 TABLET ORAL DAILY
Status: DISCONTINUED | OUTPATIENT
Start: 2025-05-15 | End: 2025-05-17 | Stop reason: HOSPADM

## 2025-05-15 RX ORDER — MORPHINE SULFATE 2 MG/ML
2 INJECTION, SOLUTION INTRAMUSCULAR; INTRAVENOUS EVERY 6 HOURS PRN
Status: DISCONTINUED | OUTPATIENT
Start: 2025-05-15 | End: 2025-05-17 | Stop reason: HOSPADM

## 2025-05-15 RX ORDER — SIMETHICONE 80 MG
1 TABLET,CHEWABLE ORAL 4 TIMES DAILY PRN
Status: DISCONTINUED | OUTPATIENT
Start: 2025-05-15 | End: 2025-05-17 | Stop reason: HOSPADM

## 2025-05-15 RX ORDER — IPRATROPIUM BROMIDE AND ALBUTEROL SULFATE 2.5; .5 MG/3ML; MG/3ML
3 SOLUTION RESPIRATORY (INHALATION) EVERY 4 HOURS PRN
Status: DISCONTINUED | OUTPATIENT
Start: 2025-05-15 | End: 2025-05-17 | Stop reason: HOSPADM

## 2025-05-15 RX ORDER — ACETAMINOPHEN 500 MG
1000 TABLET ORAL EVERY 8 HOURS PRN
Status: DISCONTINUED | OUTPATIENT
Start: 2025-05-15 | End: 2025-05-15

## 2025-05-15 RX ORDER — OXYCODONE HYDROCHLORIDE 5 MG/1
5 TABLET ORAL EVERY 6 HOURS PRN
Refills: 0 | Status: DISCONTINUED | OUTPATIENT
Start: 2025-05-15 | End: 2025-05-17 | Stop reason: HOSPADM

## 2025-05-15 RX ORDER — ATORVASTATIN CALCIUM 40 MG/1
80 TABLET, FILM COATED ORAL DAILY
Status: DISCONTINUED | OUTPATIENT
Start: 2025-05-15 | End: 2025-05-17 | Stop reason: HOSPADM

## 2025-05-15 RX ORDER — ENOXAPARIN SODIUM 100 MG/ML
40 INJECTION SUBCUTANEOUS EVERY 24 HOURS
Status: DISCONTINUED | OUTPATIENT
Start: 2025-05-15 | End: 2025-05-17 | Stop reason: HOSPADM

## 2025-05-15 RX ORDER — PANTOPRAZOLE SODIUM 40 MG/1
40 TABLET, DELAYED RELEASE ORAL DAILY
Status: DISCONTINUED | OUTPATIENT
Start: 2025-05-15 | End: 2025-05-17 | Stop reason: HOSPADM

## 2025-05-15 RX ORDER — OXYCODONE HYDROCHLORIDE 5 MG/1
5 TABLET ORAL
Refills: 0 | Status: COMPLETED | OUTPATIENT
Start: 2025-05-15 | End: 2025-05-15

## 2025-05-15 RX ORDER — TALC
6 POWDER (GRAM) TOPICAL NIGHTLY PRN
Status: DISCONTINUED | OUTPATIENT
Start: 2025-05-15 | End: 2025-05-17 | Stop reason: HOSPADM

## 2025-05-15 RX ORDER — PROCHLORPERAZINE EDISYLATE 5 MG/ML
5 INJECTION INTRAMUSCULAR; INTRAVENOUS EVERY 6 HOURS PRN
Status: DISCONTINUED | OUTPATIENT
Start: 2025-05-15 | End: 2025-05-17 | Stop reason: HOSPADM

## 2025-05-15 RX ORDER — NALOXONE HCL 0.4 MG/ML
0.02 VIAL (ML) INJECTION
Status: DISCONTINUED | OUTPATIENT
Start: 2025-05-15 | End: 2025-05-17 | Stop reason: HOSPADM

## 2025-05-15 RX ORDER — POLYETHYLENE GLYCOL 3350 17 G/17G
17 POWDER, FOR SOLUTION ORAL 2 TIMES DAILY
Status: DISCONTINUED | OUTPATIENT
Start: 2025-05-15 | End: 2025-05-17 | Stop reason: HOSPADM

## 2025-05-15 RX ORDER — FLUTICASONE PROPIONATE 50 MCG
2 SPRAY, SUSPENSION (ML) NASAL DAILY
Status: DISCONTINUED | OUTPATIENT
Start: 2025-05-15 | End: 2025-05-17 | Stop reason: HOSPADM

## 2025-05-15 RX ORDER — OXYCODONE HYDROCHLORIDE 5 MG/1
10 TABLET ORAL EVERY 6 HOURS PRN
Refills: 0 | Status: DISCONTINUED | OUTPATIENT
Start: 2025-05-15 | End: 2025-05-17 | Stop reason: HOSPADM

## 2025-05-15 RX ORDER — ONDANSETRON 8 MG/1
8 TABLET, ORALLY DISINTEGRATING ORAL EVERY 8 HOURS PRN
Status: DISCONTINUED | OUTPATIENT
Start: 2025-05-15 | End: 2025-05-17 | Stop reason: HOSPADM

## 2025-05-15 RX ORDER — ALUMINUM HYDROXIDE, MAGNESIUM HYDROXIDE, AND SIMETHICONE 1200; 120; 1200 MG/30ML; MG/30ML; MG/30ML
30 SUSPENSION ORAL 4 TIMES DAILY PRN
Status: DISCONTINUED | OUTPATIENT
Start: 2025-05-15 | End: 2025-05-17 | Stop reason: HOSPADM

## 2025-05-15 RX ORDER — ASPIRIN 81 MG/1
81 TABLET ORAL DAILY
Status: DISCONTINUED | OUTPATIENT
Start: 2025-05-15 | End: 2025-05-17 | Stop reason: HOSPADM

## 2025-05-15 RX ORDER — ACETAMINOPHEN 500 MG
1000 TABLET ORAL EVERY 8 HOURS
Status: DISCONTINUED | OUTPATIENT
Start: 2025-05-15 | End: 2025-05-17 | Stop reason: HOSPADM

## 2025-05-15 RX ORDER — KETOROLAC TROMETHAMINE 30 MG/ML
15 INJECTION, SOLUTION INTRAMUSCULAR; INTRAVENOUS 3 TIMES DAILY
Status: DISCONTINUED | OUTPATIENT
Start: 2025-05-15 | End: 2025-05-17 | Stop reason: HOSPADM

## 2025-05-15 RX ORDER — ACETAMINOPHEN 325 MG/1
650 TABLET ORAL EVERY 4 HOURS PRN
Status: DISCONTINUED | OUTPATIENT
Start: 2025-05-15 | End: 2025-05-15

## 2025-05-15 RX ORDER — MORPHINE SULFATE 4 MG/ML
4 INJECTION, SOLUTION INTRAMUSCULAR; INTRAVENOUS
Refills: 0 | Status: COMPLETED | OUTPATIENT
Start: 2025-05-15 | End: 2025-05-15

## 2025-05-15 RX ORDER — TRAZODONE HYDROCHLORIDE 50 MG/1
50 TABLET ORAL NIGHTLY
Status: DISCONTINUED | OUTPATIENT
Start: 2025-05-15 | End: 2025-05-17 | Stop reason: HOSPADM

## 2025-05-15 RX ORDER — SODIUM CHLORIDE 0.9 % (FLUSH) 0.9 %
10 SYRINGE (ML) INJECTION
Status: DISCONTINUED | OUTPATIENT
Start: 2025-05-15 | End: 2025-05-17 | Stop reason: HOSPADM

## 2025-05-15 RX ORDER — LEVETIRACETAM 500 MG/1
500 TABLET ORAL 2 TIMES DAILY
Status: DISCONTINUED | OUTPATIENT
Start: 2025-05-15 | End: 2025-05-17 | Stop reason: HOSPADM

## 2025-05-15 RX ORDER — AMITRIPTYLINE HYDROCHLORIDE 25 MG/1
25 TABLET, FILM COATED ORAL DAILY
Status: DISCONTINUED | OUTPATIENT
Start: 2025-05-15 | End: 2025-05-17 | Stop reason: HOSPADM

## 2025-05-15 RX ORDER — LIDOCAINE 50 MG/G
1 PATCH TOPICAL
Status: DISCONTINUED | OUTPATIENT
Start: 2025-05-15 | End: 2025-05-17 | Stop reason: HOSPADM

## 2025-05-15 RX ORDER — SODIUM CHLORIDE 0.9 % (FLUSH) 0.9 %
5 SYRINGE (ML) INJECTION
Status: DISCONTINUED | OUTPATIENT
Start: 2025-05-15 | End: 2025-05-17 | Stop reason: HOSPADM

## 2025-05-15 RX ORDER — FLUTICASONE FUROATE AND VILANTEROL 100; 25 UG/1; UG/1
1 POWDER RESPIRATORY (INHALATION) DAILY
Status: DISCONTINUED | OUTPATIENT
Start: 2025-05-15 | End: 2025-05-17 | Stop reason: HOSPADM

## 2025-05-15 RX ORDER — ROFLUMILAST 500 UG/1
1 TABLET ORAL DAILY
Status: DISCONTINUED | OUTPATIENT
Start: 2025-05-15 | End: 2025-05-17 | Stop reason: HOSPADM

## 2025-05-15 RX ADMIN — LIDOCAINE 1 PATCH: 50 PATCH CUTANEOUS at 02:05

## 2025-05-15 RX ADMIN — ATORVASTATIN CALCIUM 80 MG: 40 TABLET, FILM COATED ORAL at 02:05

## 2025-05-15 RX ADMIN — ROFLUMILAST 500 MCG: 500 TABLET ORAL at 02:05

## 2025-05-15 RX ADMIN — ACETAMINOPHEN 1000 MG: 500 TABLET ORAL at 09:05

## 2025-05-15 RX ADMIN — LEVETIRACETAM 500 MG: 500 TABLET, FILM COATED ORAL at 09:05

## 2025-05-15 RX ADMIN — FLUTICASONE FUROATE AND VILANTEROL TRIFENATATE 1 PUFF: 100; 25 POWDER RESPIRATORY (INHALATION) at 02:05

## 2025-05-15 RX ADMIN — KETOROLAC TROMETHAMINE 15 MG: 30 INJECTION, SOLUTION INTRAMUSCULAR; INTRAVENOUS at 02:05

## 2025-05-15 RX ADMIN — MORPHINE SULFATE 4 MG: 4 INJECTION INTRAVENOUS at 08:05

## 2025-05-15 RX ADMIN — OXYCODONE 5 MG: 5 TABLET ORAL at 04:05

## 2025-05-15 RX ADMIN — PANTOPRAZOLE SODIUM 40 MG: 40 TABLET, DELAYED RELEASE ORAL at 02:05

## 2025-05-15 RX ADMIN — AMITRIPTYLINE HYDROCHLORIDE 25 MG: 25 TABLET, FILM COATED ORAL at 02:05

## 2025-05-15 RX ADMIN — HUMAN ALBUMIN MICROSPHERES AND PERFLUTREN 0.66 MG: 10; .22 INJECTION, SOLUTION INTRAVENOUS at 01:05

## 2025-05-15 RX ADMIN — OXYCODONE 5 MG: 5 TABLET ORAL at 12:05

## 2025-05-15 RX ADMIN — FUROSEMIDE 40 MG: 40 TABLET ORAL at 02:05

## 2025-05-15 RX ADMIN — ENOXAPARIN SODIUM 40 MG: 40 INJECTION SUBCUTANEOUS at 06:05

## 2025-05-15 RX ADMIN — ASPIRIN 81 MG: 81 TABLET, COATED ORAL at 02:05

## 2025-05-15 RX ADMIN — FLUTICASONE PROPIONATE 100 MCG: 50 SPRAY, METERED NASAL at 02:05

## 2025-05-15 RX ADMIN — LEVETIRACETAM 500 MG: 500 TABLET, FILM COATED ORAL at 02:05

## 2025-05-15 RX ADMIN — LOSARTAN POTASSIUM 100 MG: 50 TABLET, FILM COATED ORAL at 02:05

## 2025-05-15 RX ADMIN — OXYCODONE 10 MG: 5 TABLET ORAL at 02:05

## 2025-05-15 RX ADMIN — TRAZODONE HYDROCHLORIDE 50 MG: 50 TABLET ORAL at 09:05

## 2025-05-15 RX ADMIN — KETOROLAC TROMETHAMINE 15 MG: 30 INJECTION, SOLUTION INTRAMUSCULAR; INTRAVENOUS at 09:05

## 2025-05-15 NOTE — TELEPHONE ENCOUNTER
Pt scheduled for XR and f/u on:    Future Appointments   Date Time Provider Department Center   5/28/2025  9:00 AM LAB, HEMONC CANCER BLDG [facility]   5/28/2025 10:00 AM [facility] BCC CT1 [facility]   5/29/2025 10:40 AM Jose Roberto Fields MD [facility] NEURO [facility]   5/30/2025  9:00 AM Isacc Nuñez IV, MD [facility] HEMONC3 [facility]   6/24/2025  7:15 AM [facility] OIC-MRI2 [facility] MRI [facility]   6/24/2025  9:30 AM Steven Campos MD [facility] NEUROSC [facility]   6/24/2025  2:00 PM Bienvenido Henson MD [facility] RADONC3 [facility]   7/1/2025 10:30 AM [facility] OIC-XRAY [facility] XRAY [facility]   7/1/2025 11:30 AM Bolivar Sr PA [facility] NEUROS[address]   7/8/2025  9:15 AM MEEKS, VISUAL FIELDS [facility] OPHTHAL [address]   7/8/2025 10:00 AM Cara Looney MD N[facility] OPHTHAL [address]   7/15/2025 10:00 AM Garrett Lloyd DPM [facility] POD [address]   7/23/2025 10:15 AM CV [facility] ECHO [facility] CARDIA [place]   8/15/2025  9:20 AM Bienvenido Ward MD [facility] PULMON [place]   8/27/2025  9:00 AM Guido Trejo MD [facility] ENT [address]   9/8/2025 11:10 AM Yan Palmer MD [facility] DERM [address]       Will mail appt notice to address on file.    ----- Message from Rogelio Hammer sent at 5/14/2025 10:03 PM CDT -----  Hello can this patient have follow up with a [facility]

## 2025-05-16 ENCOUNTER — TELEPHONE (OUTPATIENT)
Dept: HEMATOLOGY/ONCOLOGY | Facility: CLINIC | Age: 80
End: 2025-05-16
Payer: MEDICARE

## 2025-05-16 VITALS
WEIGHT: 190 LBS | DIASTOLIC BLOOD PRESSURE: 67 MMHG | RESPIRATION RATE: 18 BRPM | TEMPERATURE: 98 F | SYSTOLIC BLOOD PRESSURE: 146 MMHG | BODY MASS INDEX: 28.14 KG/M2 | HEART RATE: 87 BPM | HEIGHT: 69 IN | OXYGEN SATURATION: 95 %

## 2025-05-16 LAB
ANION GAP (OHS): 7 MMOL/L (ref 8–16)
BUN SERPL-MCNC: 34 MG/DL (ref 8–23)
CALCIUM SERPL-MCNC: 8.7 MG/DL (ref 8.7–10.5)
CHLORIDE SERPL-SCNC: 101 MMOL/L (ref 95–110)
CO2 SERPL-SCNC: 31 MMOL/L (ref 23–29)
CREAT SERPL-MCNC: 1 MG/DL (ref 0.5–1.4)
ERYTHROCYTE [DISTWIDTH] IN BLOOD BY AUTOMATED COUNT: 17.1 % (ref 11.5–14.5)
GFR SERPLBLD CREATININE-BSD FMLA CKD-EPI: >60 ML/MIN/1.73/M2
GLUCOSE SERPL-MCNC: 117 MG/DL (ref 70–110)
HCT VFR BLD AUTO: 37 % (ref 40–54)
HGB BLD-MCNC: 11.8 GM/DL (ref 14–18)
MAGNESIUM SERPL-MCNC: 2.2 MG/DL (ref 1.6–2.6)
MCH RBC QN AUTO: 28.3 PG (ref 27–31)
MCHC RBC AUTO-ENTMCNC: 31.9 G/DL (ref 32–36)
MCV RBC AUTO: 89 FL (ref 82–98)
PLATELET # BLD AUTO: 88 K/UL (ref 150–450)
PMV BLD AUTO: 10.3 FL (ref 9.2–12.9)
POCT GLUCOSE: 83 MG/DL (ref 70–110)
POCT GLUCOSE: 98 MG/DL (ref 70–110)
POTASSIUM SERPL-SCNC: 3.6 MMOL/L (ref 3.5–5.1)
RBC # BLD AUTO: 4.17 M/UL (ref 4.6–6.2)
SODIUM SERPL-SCNC: 139 MMOL/L (ref 136–145)
WBC # BLD AUTO: 6.28 K/UL (ref 3.9–12.7)

## 2025-05-16 PROCEDURE — 80048 BASIC METABOLIC PNL TOTAL CA: CPT

## 2025-05-16 PROCEDURE — 25000003 PHARM REV CODE 250

## 2025-05-16 PROCEDURE — 97535 SELF CARE MNGMENT TRAINING: CPT

## 2025-05-16 PROCEDURE — 97116 GAIT TRAINING THERAPY: CPT

## 2025-05-16 PROCEDURE — 94640 AIRWAY INHALATION TREATMENT: CPT

## 2025-05-16 PROCEDURE — 83735 ASSAY OF MAGNESIUM: CPT

## 2025-05-16 PROCEDURE — 85027 COMPLETE CBC AUTOMATED: CPT

## 2025-05-16 PROCEDURE — 25000242 PHARM REV CODE 250 ALT 637 W/ HCPCS

## 2025-05-16 PROCEDURE — 94761 N-INVAS EAR/PLS OXIMETRY MLT: CPT

## 2025-05-16 PROCEDURE — 63600175 PHARM REV CODE 636 W HCPCS

## 2025-05-16 PROCEDURE — 97161 PT EVAL LOW COMPLEX 20 MIN: CPT

## 2025-05-16 PROCEDURE — 97165 OT EVAL LOW COMPLEX 30 MIN: CPT

## 2025-05-16 RX ORDER — ACETAMINOPHEN 500 MG
500 TABLET ORAL 2 TIMES DAILY PRN
Status: ON HOLD | COMMUNITY

## 2025-05-16 RX ORDER — OXYCODONE HYDROCHLORIDE 10 MG/1
10 TABLET ORAL EVERY 6 HOURS PRN
Qty: 15 TABLET | Refills: 0 | Status: SHIPPED | OUTPATIENT
Start: 2025-05-16 | End: 2025-05-23

## 2025-05-16 RX ADMIN — LEVETIRACETAM 500 MG: 500 TABLET, FILM COATED ORAL at 08:05

## 2025-05-16 RX ADMIN — ACETAMINOPHEN 1000 MG: 500 TABLET ORAL at 05:05

## 2025-05-16 RX ADMIN — POLYETHYLENE GLYCOL 3350 17 G: 17 POWDER, FOR SOLUTION ORAL at 08:05

## 2025-05-16 RX ADMIN — OXYCODONE 10 MG: 5 TABLET ORAL at 01:05

## 2025-05-16 RX ADMIN — ENOXAPARIN SODIUM 40 MG: 40 INJECTION SUBCUTANEOUS at 04:05

## 2025-05-16 RX ADMIN — AMITRIPTYLINE HYDROCHLORIDE 25 MG: 25 TABLET, FILM COATED ORAL at 10:05

## 2025-05-16 RX ADMIN — FUROSEMIDE 40 MG: 40 TABLET ORAL at 08:05

## 2025-05-16 RX ADMIN — ASPIRIN 81 MG: 81 TABLET, COATED ORAL at 08:05

## 2025-05-16 RX ADMIN — FLUTICASONE PROPIONATE 100 MCG: 50 SPRAY, METERED NASAL at 08:05

## 2025-05-16 RX ADMIN — ACETAMINOPHEN 1000 MG: 500 TABLET ORAL at 01:05

## 2025-05-16 RX ADMIN — IPRATROPIUM BROMIDE AND ALBUTEROL SULFATE 3 ML: 2.5; .5 SOLUTION RESPIRATORY (INHALATION) at 09:05

## 2025-05-16 RX ADMIN — TIOTROPIUM BROMIDE INHALATION SPRAY 2 PUFF: 3.12 SPRAY, METERED RESPIRATORY (INHALATION) at 08:05

## 2025-05-16 RX ADMIN — LOSARTAN POTASSIUM 100 MG: 50 TABLET, FILM COATED ORAL at 08:05

## 2025-05-16 RX ADMIN — KETOROLAC TROMETHAMINE 15 MG: 30 INJECTION, SOLUTION INTRAMUSCULAR; INTRAVENOUS at 02:05

## 2025-05-16 RX ADMIN — KETOROLAC TROMETHAMINE 15 MG: 30 INJECTION, SOLUTION INTRAMUSCULAR; INTRAVENOUS at 08:05

## 2025-05-16 RX ADMIN — ATORVASTATIN CALCIUM 80 MG: 40 TABLET, FILM COATED ORAL at 08:05

## 2025-05-16 RX ADMIN — PANTOPRAZOLE SODIUM 40 MG: 40 TABLET, DELAYED RELEASE ORAL at 08:05

## 2025-05-16 RX ADMIN — FLUTICASONE FUROATE AND VILANTEROL TRIFENATATE 1 PUFF: 100; 25 POWDER RESPIRATORY (INHALATION) at 08:05

## 2025-05-16 RX ADMIN — LIDOCAINE 1 PATCH: 50 PATCH CUTANEOUS at 11:05

## 2025-05-16 RX ADMIN — ROFLUMILAST 500 MCG: 500 TABLET ORAL at 10:05

## 2025-05-16 NOTE — TELEPHONE ENCOUNTER
"----- Message from Popeye sent at 5/16/2025  3:23 PM CDT -----  Consult/AdvisoryName Of Caller:Jer Grimaldo Preference?: [phone] Provider Name: Kale patient feel the need to be seen today? NoWhat is the nature of the call?: Calling to discuss pt's current admissionAdditional Notes:"Thank you for all that you do for our patients"  "

## 2025-05-16 NOTE — TELEPHONE ENCOUNTER
Spoke with pt daughter. Pt daughter states that pt is still in ER from initial visit where he was dx with a spinal compression fracture. She mentioned that pt was initially supposed to be admitted, but is now going to be discharged with a back brace and pain medication. She had questions whether the compression fx was r/t cancer, to which the ED doctor told her it did not appear so. Informed her that per discussion with Dr. Nuñez, it is possible the fx be r/t use of corticosteroids recently.     Pt scheduled for f/u on 5/30. Daughter agreeable with leaving apt scheduled as is but willing to move it up quicker if Dr. Nuñez felt the need to. Will reach out to provider.

## 2025-05-18 ENCOUNTER — PATIENT MESSAGE (OUTPATIENT)
Dept: ADMINISTRATIVE | Facility: OTHER | Age: 80
End: 2025-05-18
Payer: MEDICARE

## 2025-05-19 ENCOUNTER — HOSPITAL ENCOUNTER (EMERGENCY)
Facility: HOSPITAL | Age: 80
Discharge: HOME | End: 2025-05-19
Attending: EMERGENCY MEDICINE
Payer: MEDICARE

## 2025-05-19 VITALS
DIASTOLIC BLOOD PRESSURE: 78 MMHG | HEIGHT: 69 IN | BODY MASS INDEX: 28.15 KG/M2 | HEART RATE: 83 BPM | WEIGHT: 190.06 LBS | RESPIRATION RATE: 15 BRPM | TEMPERATURE: 98 F | SYSTOLIC BLOOD PRESSURE: 140 MMHG | OXYGEN SATURATION: 97 %

## 2025-05-19 DIAGNOSIS — K59.00 CONSTIPATION, UNSPECIFIED CONSTIPATION TYPE: Primary | ICD-10-CM

## 2025-05-19 LAB
ABSOLUTE EOSINOPHIL (OHS): 0.03 K/UL
ABSOLUTE MONOCYTE (OHS): 0.68 K/UL (ref 0.3–1)
ABSOLUTE NEUTROPHIL COUNT (OHS): 4.65 K/UL (ref 1.8–7.7)
ALBUMIN SERPL BCP-MCNC: 2.6 G/DL (ref 3.5–5.2)
ALP SERPL-CCNC: 109 UNIT/L (ref 40–150)
ALT SERPL W/O P-5'-P-CCNC: 29 UNIT/L (ref 10–44)
AMORPH CRY UR QL COMP ASSIST: NORMAL
ANION GAP (OHS): 10 MMOL/L (ref 8–16)
AST SERPL-CCNC: 26 UNIT/L (ref 11–45)
BACTERIA #/AREA URNS AUTO: NORMAL /HPF
BASOPHILS # BLD AUTO: 0.01 K/UL
BASOPHILS NFR BLD AUTO: 0.2 %
BILIRUB SERPL-MCNC: 1 MG/DL (ref 0.1–1)
BILIRUB UR QL STRIP.AUTO: NEGATIVE
BUN SERPL-MCNC: 28 MG/DL (ref 6–30)
BUN SERPL-MCNC: 28 MG/DL (ref 8–23)
CALCIUM SERPL-MCNC: 8.7 MG/DL (ref 8.7–10.5)
CHLORIDE SERPL-SCNC: 97 MMOL/L (ref 95–110)
CHLORIDE SERPL-SCNC: 99 MMOL/L (ref 95–110)
CLARITY UR: ABNORMAL
CO2 SERPL-SCNC: 33 MMOL/L (ref 23–29)
COLOR UR AUTO: YELLOW
CREAT SERPL-MCNC: 0.9 MG/DL (ref 0.5–1.4)
CREAT SERPL-MCNC: 1.1 MG/DL (ref 0.5–1.4)
ERYTHROCYTE [DISTWIDTH] IN BLOOD BY AUTOMATED COUNT: 16.6 % (ref 11.5–14.5)
GFR SERPLBLD CREATININE-BSD FMLA CKD-EPI: >60 ML/MIN/1.73/M2
GLUCOSE SERPL-MCNC: 128 MG/DL (ref 70–110)
GLUCOSE SERPL-MCNC: 135 MG/DL (ref 70–110)
GLUCOSE UR QL STRIP: NEGATIVE
HCT VFR BLD AUTO: 38 % (ref 40–54)
HCT VFR BLD CALC: 38 %PCV (ref 36–54)
HGB BLD-MCNC: 12.1 GM/DL (ref 14–18)
HGB UR QL STRIP: NEGATIVE
HOLD SPECIMEN: NORMAL
HYALINE CASTS UR QL AUTO: 0 /LPF (ref 0–1)
IMM GRANULOCYTES # BLD AUTO: 0.17 K/UL (ref 0–0.04)
IMM GRANULOCYTES NFR BLD AUTO: 2.9 % (ref 0–0.5)
KETONES UR QL STRIP: NEGATIVE
LEUKOCYTE ESTERASE UR QL STRIP: NEGATIVE
LIPASE SERPL-CCNC: 12 U/L (ref 4–60)
LYMPHOCYTES # BLD AUTO: 0.27 K/UL (ref 1–4.8)
MCH RBC QN AUTO: 28.1 PG (ref 27–31)
MCHC RBC AUTO-ENTMCNC: 31.8 G/DL (ref 32–36)
MCV RBC AUTO: 88 FL (ref 82–98)
MICROSCOPIC COMMENT: NORMAL
NITRITE UR QL STRIP: NEGATIVE
NUCLEATED RBC (/100WBC) (OHS): 0 /100 WBC
OHS QRS DURATION: 72 MS
OHS QTC CALCULATION: 430 MS
PH UR STRIP: 8 [PH]
PLATELET # BLD AUTO: 109 K/UL (ref 150–450)
PMV BLD AUTO: 10.7 FL (ref 9.2–12.9)
POC IONIZED CALCIUM: 1.09 MMOL/L (ref 1.06–1.42)
POC TCO2 (MEASURED): 31 MMOL/L (ref 23–29)
POTASSIUM BLD-SCNC: 3.4 MMOL/L (ref 3.5–5.1)
POTASSIUM SERPL-SCNC: 3.5 MMOL/L (ref 3.5–5.1)
PROT SERPL-MCNC: 6 GM/DL (ref 6–8.4)
PROT UR QL STRIP: ABNORMAL
RBC # BLD AUTO: 4.3 M/UL (ref 4.6–6.2)
RBC #/AREA URNS AUTO: 3 /HPF (ref 0–4)
RELATIVE EOSINOPHIL (OHS): 0.5 %
RELATIVE LYMPHOCYTE (OHS): 4.6 % (ref 18–48)
RELATIVE MONOCYTE (OHS): 11.7 % (ref 4–15)
RELATIVE NEUTROPHIL (OHS): 80.1 % (ref 38–73)
SAMPLE: ABNORMAL
SODIUM BLD-SCNC: 138 MMOL/L (ref 136–145)
SODIUM SERPL-SCNC: 142 MMOL/L (ref 136–145)
SP GR UR STRIP: 1.02
UROBILINOGEN UR STRIP-ACNC: ABNORMAL EU/DL
WBC # BLD AUTO: 5.81 K/UL (ref 3.9–12.7)
WBC #/AREA URNS AUTO: 3 /HPF (ref 0–5)

## 2025-05-19 PROCEDURE — 83690 ASSAY OF LIPASE: CPT

## 2025-05-19 PROCEDURE — 25500020 PHARM REV CODE 255: Performed by: EMERGENCY MEDICINE

## 2025-05-19 PROCEDURE — 85025 COMPLETE CBC W/AUTO DIFF WBC: CPT

## 2025-05-19 PROCEDURE — 25000003 PHARM REV CODE 250

## 2025-05-19 PROCEDURE — 80047 BASIC METABLC PNL IONIZED CA: CPT

## 2025-05-19 PROCEDURE — 81003 URINALYSIS AUTO W/O SCOPE: CPT

## 2025-05-19 PROCEDURE — 82330 ASSAY OF CALCIUM: CPT | Mod: 59

## 2025-05-19 PROCEDURE — 99285 EMERGENCY DEPT VISIT HI MDM: CPT | Mod: 25

## 2025-05-19 PROCEDURE — 80053 COMPREHEN METABOLIC PANEL: CPT

## 2025-05-19 RX ORDER — OXYCODONE HYDROCHLORIDE 5 MG/1
10 TABLET ORAL
Refills: 0 | Status: COMPLETED | OUTPATIENT
Start: 2025-05-19 | End: 2025-05-19

## 2025-05-19 RX ORDER — IBUPROFEN 400 MG/1
400 TABLET, FILM COATED ORAL
Status: COMPLETED | OUTPATIENT
Start: 2025-05-19 | End: 2025-05-19

## 2025-05-19 RX ORDER — SODIUM CHLORIDE 0.9 G/100ML
500 IRRIGANT IRRIGATION
Status: DISCONTINUED | OUTPATIENT
Start: 2025-05-19 | End: 2025-05-19

## 2025-05-19 RX ORDER — SYRING-NEEDL,DISP,INSUL,0.3 ML 29 G X1/2"
296 SYRINGE, EMPTY DISPOSABLE MISCELLANEOUS
Status: DISCONTINUED | OUTPATIENT
Start: 2025-05-19 | End: 2025-05-19

## 2025-05-19 RX ORDER — PSEUDOEPHEDRINE/ACETAMINOPHEN 30MG-500MG
100 TABLET ORAL
Status: DISCONTINUED | OUTPATIENT
Start: 2025-05-19 | End: 2025-05-19

## 2025-05-19 RX ADMIN — OXYCODONE 10 MG: 5 TABLET ORAL at 07:05

## 2025-05-19 RX ADMIN — IBUPROFEN 400 MG: 400 TABLET ORAL at 07:05

## 2025-05-19 RX ADMIN — IOHEXOL 75 ML: 350 INJECTION, SOLUTION INTRAVENOUS at 09:05

## 2025-05-19 NOTE — ED NOTES
I-STAT Chem-8+ Results:   Value Reference Range   Sodium 138 136-145 mmol/L   Potassium  3.4 3.5-5.1 mmol/L   Chloride 97  mmol/L   Ionized Calcium 1.09 1.06-1.42 mmol/L   CO2 (measured) 31 23-29 mmol/L   Glucose 135  mg/dL   BUN 28 6-30 mg/dL   Creatinine 1.1 0.5-1.4 mg/dL   Hematocrit 38 36-54%

## 2025-05-19 NOTE — ED NOTES
Assumed care of the patient. Report received from SALO Caldwell. Pt on continuous cardiac monitoring, continuous pulse oximetry, and automatic BP cuff cycling Q30min. Pt in hospital gown, side rails up X2, bed low and locked, and call light is placed within reach. No family/visitors at bedside at this time. Pt endorses 9/10 back pain. Pt requesting pain medicine. Pt assisted to bedside commode.

## 2025-05-19 NOTE — ED NOTES
Assumed care of patient at this time. Pt arrive to ED with a complaint of constipation,Pt denies any abdominal pain,N/V/D.Pt placed in hospital gown and currently lying in stretcher. Vital signs are stable, provided pt with warm blanket. Pt denied restroom use. No other complaints from pt at this time.     Review of patient's allergies indicates:   Allergen Reactions    Brilinta [ticagrelor] Itching    Lisinopril      Other reaction(s): cough    Metoprolol succinate Rash     Past Medical History:   Diagnosis Date    Benign neoplasm of colon 10/01/2013    Bronchitis chronic     CAD (coronary artery disease) 10/31/2013    COPD (chronic obstructive pulmonary disease)     Emphysema of lung     Encounter for preventive health examination 3/21/2018    GERD (gastroesophageal reflux disease)     Hx of colonic polyps     Hypertension     NAFLD (nonalcoholic fatty liver disease) 03/27/2017    SHOLA on CPAP     RLS (restless legs syndrome)     Screen for colon cancer 08/30/2013

## 2025-05-19 NOTE — ED PROVIDER NOTES
Encounter Date: 5/19/2025       History     Chief Complaint   Patient presents with    Constipation     Pt arrives via EMS for constipation. Last BM a week ago.     The history is provided by the patient.     Patient is a 79-year-old male with a past medical history of hypertension, COPD, CAD, SHOLA who presents due to constipation.  He states that it has been 5 days since he had his last bowel movement.  He notes that just prior to coming to the emergency department, he was straining to have a bowel movement and although a little came out, he feels he just can not get any other significant stool.  Of note, he was recently admitted to the hospital 5 days ago after he was found to have a compression fracture.  He has been wearing a TSH hello brace since then and feels that his pain has been adequately controlled.  However, given the lack of bowel movements he is concerned and wanted to come to the emergency department to be evaluated for this.  He tried taking milk of magnesia and states that this did not improve his symptoms.  Says that he has a abdominal discomfort but no major pain.    Review of patient's allergies indicates:   Allergen Reactions    Brilinta [ticagrelor] Itching    Lisinopril      Other reaction(s): cough    Metoprolol succinate Rash     Past Medical History:   Diagnosis Date    Benign neoplasm of colon 10/01/2013    Bronchitis chronic     CAD (coronary artery disease) 10/31/2013    COPD (chronic obstructive pulmonary disease)     Emphysema of lung     Encounter for preventive health examination 3/21/2018    GERD (gastroesophageal reflux disease)     Hx of colonic polyps     Hypertension     NAFLD (nonalcoholic fatty liver disease) 03/27/2017    SHOLA on CPAP     RLS (restless legs syndrome)     Screen for colon cancer 08/30/2013     Past Surgical History:   Procedure Laterality Date    bilateral foot surgery  1993    CARDIAC CATHETERIZATION  2013    x1    CATARACT EXTRACTION, BILATERAL      COLON  SURGERY  2013    Ace Mott MD    COLONOSCOPY N/A 3/21/2018    Procedure: COLONOSCOPY;  Surgeon: Terry Mott MD;  [facility]  Service: Endoscopy;  Laterality: N/A;    COLONOSCOPY N/A 4/12/2019    Procedure: COLONOSCOPY;  Surgeon: John Mantilla MD;  Location: [facility] (4TH FLR);  Service: Endoscopy;  Laterality: N/A;    COLONOSCOPY N/A 5/31/2022    Procedure: COLONOSCOPY;  Surgeon: MEDINA Farris MD;  [facility]  Service: Endoscopy;  Laterality: N/A;  fully vacc-inst portal-tb    ENDOBRONCHIAL ULTRASOUND Bilateral 4/30/2024    Procedure: ENDOBRONCHIAL ULTRASOUND (EBUS);  Surgeon: Naveed Aguero MD;  Location[facility] OR;  Service: Pulmonary;  Laterality: Bilateral;    scrotal abscess removal       Family History   Problem Relation Name Age of Onset    Heart disease Mother      Heart attack Mother      Hypertension Mother      No Known Problems Sister      No Known Problems Daughter 2     Asthma Neg Hx      Emphysema Neg Hx      Prostate cancer Neg Hx      Cirrhosis Neg Hx       Social History[1]      Physical Exam     Initial Vitals [05/19/25 0213]   BP Pulse Resp Temp SpO2   (!) 159/70 88 16 98.5 °F (36.9 °C) 98 %      MAP       --         Physical Exam    Nursing note and vitals reviewed.  Constitutional: He appears well-developed. No distress.   Comfortably lying on the bed.  Wearing a TLSO brace.  Fully able to communicate and not in any acute distress   HENT:   Head: Normocephalic and atraumatic. Mouth/Throat: Oropharynx is clear and moist and mucous membranes are normal.   Eyes: EOM are normal. Pupils are equal, round, and reactive to light.   Neck:   Normal range of motion.  Cardiovascular:  Normal rate, regular rhythm and normal heart sounds.           No murmur heard.  Pulmonary/Chest: No respiratory distress.   Mild scattered wheezes auscultated   Abdominal:   Mild abdominal distention noted, very minimal tenderness to palpation noted in the epigastric  region of the patient's abdomen   Musculoskeletal:         General: No edema.      Cervical back: Normal range of motion.     Neurological: He is alert and oriented to person, place, and time.   Skin: Skin is warm.   Psychiatric: He has a normal mood and affect. His behavior is normal. Thought content normal.         ED Course   Procedures  Labs Reviewed   COMPREHENSIVE METABOLIC PANEL - Abnormal       Result Value    Sodium 142      Potassium 3.5      Chloride 99      CO2 33 (*)     Glucose 128 (*)     BUN 28 (*)     Creatinine 0.9      Calcium 8.7      Protein Total 6.0      Albumin 2.6 (*)     Bilirubin Total 1.0            AST 26      ALT 29      Anion Gap 10      eGFR >60     URINALYSIS, REFLEX TO URINE CULTURE - Abnormal    Color, UA Yellow      Appearance, UA Hazy (*)     pH, UA 8.0      Spec Grav UA 1.025      Protein, UA 1+ (*)     Glucose, UA Negative      Ketones, UA Negative      Bilirubin, UA Negative      Blood, UA Negative      Nitrites, UA Negative      Urobilinogen, UA 4.0-6.0 (*)     Leukocyte Esterase, UA Negative     CBC WITH DIFFERENTIAL - Abnormal    WBC 5.81      RBC 4.30 (*)     HGB 12.1 (*)     HCT 38.0 (*)     MCV 88      MCH 28.1      MCHC 31.8 (*)     RDW 16.6 (*)     Platelet Count 109 (*)     MPV 10.7      Nucleated RBC 0      Neut % 80.1 (*)     Lymph % 4.6 (*)     Mono % 11.7      Eos % 0.5      Basophil % 0.2      Imm Grans % 2.9 (*)     Neut # 4.65      Lymph # 0.27 (*)     Mono # 0.68      Eos # 0.03      Baso # 0.01      Imm Grans # 0.17 (*)    ISTAT PROCEDURE - Abnormal    POC Glucose 135 (*)     POC BUN 28      POC Creatinine 1.1      POC Sodium 138      POC Potassium 3.4 (*)     POC Chloride 97      POC TCO2 (MEASURED) 31 (*)     POC Ionized Calcium 1.09      POC Hematocrit 38      Sample EVE     LIPASE - Normal    Lipase Level 12     CBC W/ AUTO DIFFERENTIAL    Narrative:     The following orders were created for panel order CBC W/ AUTO DIFFERENTIAL.  Procedure                                Abnormality         Status                     ---------                               -----------         ------                     CBC with Differential[[id number]]       Abnormal            Final result                 Please view results for these tests on the individual orders.   GREY TOP URINE HOLD    Extra Tube Hold for add-ons.     URINALYSIS MICROSCOPIC    RBC, UA 3      WBC, UA 3      Bacteria, UA Rare      Hyaline Casts, UA 0      Amorphous, UA Occasional      Microscopic Comment       ISTAT CHEM8          Imaging Results    None          Medications   glycerin 99.5% topical solution 100 mL (has no administration in time range)     And   magnesium citrate solution 296 mL (has no administration in time range)     And   sodium chloride 0.9% irrigation 500 mL (has no administration in time range)     Medical Decision Making  Patient is a 79-year-old male who presents due to constipation.  Differential diagnosis includes but is not limited to primary constipation, ileus, gastroparesis, medication side effect, SBO, stercoral colitis, Teri syndrome, UTI, pancreatitis.  Vital signs noted to be reassuring and he was noted to be hemodynamically stable.  As part of the workup for my differential, I will obtain a CBC, CMP, lipase, UA and a CT abdomen with contrast.  We will give a brown bomb enema with the hopes of inducing a bowel movement.    Prior to going to CT scan/receiving the brown bomb enema, the patient was able to have multiple bowel movements as reported by the nursing staff.  I re-evaluated the patient he stated that he was feeling much better.  For this reason, we will hold off on giving the brown bomb enema.    The entirety of the patient's laboratory workup was shown to be unremarkable.  Patient is signed out to the oncoming team pending the results of his CT abdomen.  Disposition based on the results of the study, however if negative I anticipate he will be discharged  home.    Amount and/or Complexity of Data Reviewed  External Data Reviewed: notes.  Labs: ordered.  Radiology: ordered.    Risk  OTC drugs.  Prescription drug management.                                      Clinical Impression:  Final diagnoses:  [K59.00] Constipation, unspecified constipation type (Primary)                     [1]   Social History  Tobacco Use    Smoking status: Former     Current packs/day: 0.00     Average packs/day: 1 pack/day for 40.0 years (40.0 ttl pk-yrs)     Types: Cigarettes     Start date: 8/15/1972     Quit date: 8/15/2012     Years since quittin.7     Passive exposure: Never    Smokeless tobacco: Never   Substance Use Topics    Alcohol use: Yes     Alcohol/week: 3.0 standard drinks of alcohol     Types: 3 Cans of beer per week     Comment: maybe 2-3  beers weekly    Drug use: No        Rush Little MD  Resident  [phone]

## 2025-05-19 NOTE — PLAN OF CARE
05/19/25 0648   Post-Acute Status   Post-Acute Authorization [facility]   Home Health Status Pending medical clearance/testing   Discharge Delays None known at this time   Discharge Plan   Discharge Plan A Home Health     Pt d/c with [facility] to admit on 05/18, per chart notes.      SW sent updates via [employer] to [facility]       Discharge Plan A and Plan B have been determined by review of patient's clinical status, future medical and therapeutic needs, and coverage/benefits for post-acute care in coordination with multidisciplinary team members.    Anjelica Levy CD, MSW, LMSW, RSW   Case Management  [facility]  Email: [email or web address]

## 2025-05-19 NOTE — DISCHARGE INSTRUCTIONS
You may continue to take docusate and MiraLax to treat your constipation.  With the MiraLax, you can take it as often as once every hour to a titratable affect.  Once you start having bowel movements consistently, you can take it less often, but change the amount as needed based on how many bowel movements who having.  If you start developing severe pain or discomfort that acutely worsens, please come back to the emergency department for immediate evaluation.

## 2025-05-19 NOTE — PROVIDER PROGRESS NOTES - EMERGENCY DEPT.
"Encounter Date: 5/19/2025    ED Physician Progress Notes        Physician Note:   ED RESIDENT  PHYSICIAN HAND-OFF NOTE:  7:40 AM 5/19/2025  Jordin Allen Jr. is a 79 y.o. male who presented to the ED on 5/19/2025 and has been managed by , who reports patient C/O abdominal pain and constipation. I assumed care of patient from off-going ED physician team at 6 AM pending CT abdomen and pelvis.    On my evaluation, Jordin Allen Jr. appears well, hemodynamically stable and in NAD. Thus far, Jordin Allen Jr. has received:  Medications  ibuprofen tablet 400 mg (400 mg Oral Given 5/19/25 0728)  oxyCODONE immediate release tablet 10 mg (10 mg Oral Given 5/19/25 0728)    On my exam, I appreciate:  /61 (BP Location: Right arm, Patient Position: Lying)   Pulse 98   Temp 97.8 °F (36.6 °C) (Oral)   Resp 16   Ht 5' 9" (1.753 m)   Wt 86.2 kg (190 lb 0.6 oz)   SpO2 96%   BMI 28.06 kg/m²   Constitutional: Well-appearing; Well-Nourished; Non-Toxic-appearing and in NAD.  Abdomen is nondistended, nontender to palpation.  No Magana's sign McBurney's point tenderness or Rovsing sign.  Pulses are 2+ intact distally bilateral upper and lower extremities       Disposition: I anticipate patient will discharge if CT abdomen and pelvis is negative.  Negative CT scan.  Patient's abdominal pain vastly improved after having bowel movements.  I have discussed and counseled Jordin Allen Jr. regarding exam, results, diagnosis, treatment, and plan.  ______________________  Jax Muller DO  Emergency Medicine [id number]  7:40 AM 5/19/2025       "

## 2025-05-21 ENCOUNTER — PATIENT MESSAGE (OUTPATIENT)
Dept: ADMINISTRATIVE | Facility: OTHER | Age: 80
End: 2025-05-21
Payer: MEDICARE

## 2025-05-22 ENCOUNTER — TELEPHONE (OUTPATIENT)
Dept: HEMATOLOGY/ONCOLOGY | Facility: CLINIC | Age: 80
End: 2025-05-22
Payer: MEDICARE

## 2025-05-22 ENCOUNTER — HOSPITAL ENCOUNTER (INPATIENT)
Facility: HOSPITAL | Age: 80
LOS: 11 days | Discharge: NURSING, REHAB OR LONG-TERM CARE | DRG: 193 | End: 2025-06-03
Attending: EMERGENCY MEDICINE | Admitting: STUDENT IN AN ORGANIZED HEALTH CARE EDUCATION/TRAINING PROGRAM
Payer: MEDICARE

## 2025-05-22 DIAGNOSIS — R06.02 SHORTNESS OF BREATH: ICD-10-CM

## 2025-05-22 DIAGNOSIS — E87.70 FLUID OVERLOAD: ICD-10-CM

## 2025-05-22 DIAGNOSIS — R00.2 PALPITATIONS: ICD-10-CM

## 2025-05-22 DIAGNOSIS — R00.0 TACHYCARDIA: ICD-10-CM

## 2025-05-22 DIAGNOSIS — R00.0 INCREASED HEART RATE: ICD-10-CM

## 2025-05-22 DIAGNOSIS — I50.9 CHF (CONGESTIVE HEART FAILURE): ICD-10-CM

## 2025-05-22 DIAGNOSIS — R07.9 CHEST PAIN: ICD-10-CM

## 2025-05-22 DIAGNOSIS — L03.90 CELLULITIS: ICD-10-CM

## 2025-05-22 PROBLEM — J69.0 ASPIRATION PNEUMONIA: Status: ACTIVE | Noted: 2025-05-22

## 2025-05-22 LAB
ABSOLUTE EOSINOPHIL (OHS): 0.03 K/UL
ABSOLUTE MONOCYTE (OHS): 0.54 K/UL (ref 0.3–1)
ABSOLUTE NEUTROPHIL COUNT (OHS): 4.37 K/UL (ref 1.8–7.7)
ALBUMIN SERPL BCP-MCNC: 2.6 G/DL (ref 3.5–5.2)
ALP SERPL-CCNC: 106 UNIT/L (ref 40–150)
ALT SERPL W/O P-5'-P-CCNC: 24 UNIT/L (ref 10–44)
AMPHET UR QL SCN: NEGATIVE
ANION GAP (OHS): 11 MMOL/L (ref 8–16)
AST SERPL-CCNC: 24 UNIT/L (ref 11–45)
B PERT DNA NPH QL NAA+PROBE: NOT DETECTED
BARBITURATE SCN PRESENT UR: NEGATIVE
BASOPHILS # BLD AUTO: 0.02 K/UL
BASOPHILS NFR BLD AUTO: 0.4 %
BENZODIAZ UR QL SCN: NEGATIVE
BILIRUB SERPL-MCNC: 0.8 MG/DL (ref 0.1–1)
BILIRUB UR QL STRIP.AUTO: NEGATIVE
BNP SERPL-MCNC: 63 PG/ML (ref 0–99)
BUN SERPL-MCNC: 23 MG/DL (ref 8–23)
C PNEUM DNA LOWER RESP QL NAA+NON-PROBE: NOT DETECTED
CALCIUM SERPL-MCNC: 9 MG/DL (ref 8.7–10.5)
CANNABINOIDS UR QL SCN: NEGATIVE
CHLORIDE SERPL-SCNC: 98 MMOL/L (ref 95–110)
CLARITY UR: CLEAR
CO2 SERPL-SCNC: 26 MMOL/L (ref 23–29)
COCAINE UR QL SCN: NEGATIVE
COLOR UR AUTO: COLORLESS
CREAT SERPL-MCNC: 1 MG/DL (ref 0.5–1.4)
CREAT UR-MCNC: 15 MG/DL (ref 23–375)
CRP SERPL-MCNC: 224.6 MG/L
ERYTHROCYTE [DISTWIDTH] IN BLOOD BY AUTOMATED COUNT: 16.1 % (ref 11.5–14.5)
ERYTHROCYTE [SEDIMENTATION RATE] IN BLOOD BY PHOTOMETRIC METHOD: 97 MM/HR
ETHANOL UR-MCNC: <10 MG/DL
FLUAV RNA NPH QL NAA+NON-PROBE: NOT DETECTED
FLUBV RNA NPH QL NAA+NON-PROBE: NOT DETECTED
GFR SERPLBLD CREATININE-BSD FMLA CKD-EPI: >60 ML/MIN/1.73/M2
GLUCOSE SERPL-MCNC: 95 MG/DL (ref 70–110)
GLUCOSE UR QL STRIP: NEGATIVE
HADV DNA NPH QL NAA+NON-PROBE: NOT DETECTED
HCOV 229E RNA NPH QL NAA+NON-PROBE: NOT DETECTED
HCOV HKU1 RNA NPH QL NAA+NON-PROBE: NOT DETECTED
HCOV NL63 RNA NPH QL NAA+NON-PROBE: NOT DETECTED
HCOV OC43 RNA NPH QL NAA+NON-PROBE: NOT DETECTED
HCT VFR BLD AUTO: 37.7 % (ref 40–54)
HGB BLD-MCNC: 11.9 GM/DL (ref 14–18)
HGB UR QL STRIP: NEGATIVE
HMPV RNA LOWER RESP QL NAA+NON-PROBE: NOT DETECTED
HMPV RNA NPH QL NAA+NON-PROBE: NOT DETECTED
HPIV1 RNA NPH QL NAA+NON-PROBE: NOT DETECTED
HPIV2 RNA NPH QL NAA+NON-PROBE: NOT DETECTED
HPIV3 RNA NPH QL NAA+NON-PROBE: DETECTED
HPIV4 RNA NPH QL NAA+NON-PROBE: NOT DETECTED
IMM GRANULOCYTES # BLD AUTO: 0.17 K/UL (ref 0–0.04)
IMM GRANULOCYTES NFR BLD AUTO: 3.1 % (ref 0–0.5)
KETONES UR QL STRIP: NEGATIVE
LACTATE SERPL-SCNC: 1.5 MMOL/L (ref 0.5–2.2)
LACTATE SERPL-SCNC: 2.4 MMOL/L (ref 0.5–2.2)
LEUKOCYTE ESTERASE UR QL STRIP: NEGATIVE
LYMPHOCYTES # BLD AUTO: 0.34 K/UL (ref 1–4.8)
MCH RBC QN AUTO: 28.3 PG (ref 27–31)
MCHC RBC AUTO-ENTMCNC: 31.6 G/DL (ref 32–36)
MCV RBC AUTO: 90 FL (ref 82–98)
METHADONE UR QL SCN: NEGATIVE
NITRITE UR QL STRIP: NEGATIVE
NUCLEATED RBC (/100WBC) (OHS): 0 /100 WBC
OHS QRS DURATION: 70 MS
OHS QRS DURATION: 80 MS
OHS QTC CALCULATION: 432 MS
OHS QTC CALCULATION: 449 MS
OPIATES UR QL SCN: NEGATIVE
PCP UR QL: NEGATIVE
PH UR STRIP: 7 [PH]
PLATELET # BLD AUTO: 158 K/UL (ref 150–450)
PLATELET BLD QL SMEAR: NORMAL
PMV BLD AUTO: 9.9 FL (ref 9.2–12.9)
POTASSIUM SERPL-SCNC: 3.8 MMOL/L (ref 3.5–5.1)
PROT SERPL-MCNC: 6.5 GM/DL (ref 6–8.4)
PROT UR QL STRIP: NEGATIVE
RBC # BLD AUTO: 4.2 M/UL (ref 4.6–6.2)
RELATIVE EOSINOPHIL (OHS): 0.5 %
RELATIVE LYMPHOCYTE (OHS): 6.2 % (ref 18–48)
RELATIVE MONOCYTE (OHS): 9.9 % (ref 4–15)
RELATIVE NEUTROPHIL (OHS): 79.9 % (ref 38–73)
RSV RNA NPH QL NAA+NON-PROBE: NOT DETECTED
RSV RNA NPH QL NAA+NON-PROBE: NOT DETECTED
RV+EV RNA NPH QL NAA+NON-PROBE: NOT DETECTED
SARS-COV-2 RNA RESP QL NAA+PROBE: NOT DETECTED
SODIUM SERPL-SCNC: 135 MMOL/L (ref 136–145)
SP GR UR STRIP: 1.01
SPECIMEN SOURCE: ABNORMAL
TROPONIN I SERPL HS-MCNC: 11 NG/L
TSH SERPL-ACNC: 0.86 UIU/ML (ref 0.4–4)
UROBILINOGEN UR STRIP-ACNC: NEGATIVE EU/DL
WBC # BLD AUTO: 5.47 K/UL (ref 3.9–12.7)

## 2025-05-22 PROCEDURE — 81003 URINALYSIS AUTO W/O SCOPE: CPT | Performed by: STUDENT IN AN ORGANIZED HEALTH CARE EDUCATION/TRAINING PROGRAM

## 2025-05-22 PROCEDURE — 80053 COMPREHEN METABOLIC PANEL: CPT | Performed by: EMERGENCY MEDICINE

## 2025-05-22 PROCEDURE — 87150 DNA/RNA AMPLIFIED PROBE: CPT

## 2025-05-22 PROCEDURE — 94761 N-INVAS EAR/PLS OXIMETRY MLT: CPT

## 2025-05-22 PROCEDURE — 99285 EMERGENCY DEPT VISIT HI MDM: CPT | Mod: 25

## 2025-05-22 PROCEDURE — G0378 HOSPITAL OBSERVATION PER HR: HCPCS

## 2025-05-22 PROCEDURE — 36415 COLL VENOUS BLD VENIPUNCTURE: CPT | Performed by: STUDENT IN AN ORGANIZED HEALTH CARE EDUCATION/TRAINING PROGRAM

## 2025-05-22 PROCEDURE — 96375 TX/PRO/DX INJ NEW DRUG ADDON: CPT

## 2025-05-22 PROCEDURE — 85025 COMPLETE CBC W/AUTO DIFF WBC: CPT | Performed by: EMERGENCY MEDICINE

## 2025-05-22 PROCEDURE — 87040 BLOOD CULTURE FOR BACTERIA: CPT

## 2025-05-22 PROCEDURE — 93005 ELECTROCARDIOGRAM TRACING: CPT

## 2025-05-22 PROCEDURE — 25500020 PHARM REV CODE 255: Performed by: STUDENT IN AN ORGANIZED HEALTH CARE EDUCATION/TRAINING PROGRAM

## 2025-05-22 PROCEDURE — 63600175 PHARM REV CODE 636 W HCPCS: Performed by: EMERGENCY MEDICINE

## 2025-05-22 PROCEDURE — 96365 THER/PROPH/DIAG IV INF INIT: CPT

## 2025-05-22 PROCEDURE — 0202U NFCT DS 22 TRGT SARS-COV-2: CPT | Performed by: STUDENT IN AN ORGANIZED HEALTH CARE EDUCATION/TRAINING PROGRAM

## 2025-05-22 PROCEDURE — 83605 ASSAY OF LACTIC ACID: CPT | Mod: 59

## 2025-05-22 PROCEDURE — 93010 ELECTROCARDIOGRAM REPORT: CPT | Mod: ,,, | Performed by: INTERNAL MEDICINE

## 2025-05-22 PROCEDURE — 85652 RBC SED RATE AUTOMATED: CPT

## 2025-05-22 PROCEDURE — 99900035 HC TECH TIME PER 15 MIN (STAT)

## 2025-05-22 PROCEDURE — 25000003 PHARM REV CODE 250: Performed by: STUDENT IN AN ORGANIZED HEALTH CARE EDUCATION/TRAINING PROGRAM

## 2025-05-22 PROCEDURE — 86140 C-REACTIVE PROTEIN: CPT

## 2025-05-22 PROCEDURE — 96367 TX/PROPH/DG ADDL SEQ IV INF: CPT

## 2025-05-22 PROCEDURE — 96372 THER/PROPH/DIAG INJ SC/IM: CPT | Performed by: STUDENT IN AN ORGANIZED HEALTH CARE EDUCATION/TRAINING PROGRAM

## 2025-05-22 PROCEDURE — 27000221 HC OXYGEN, UP TO 24 HOURS

## 2025-05-22 PROCEDURE — 94640 AIRWAY INHALATION TREATMENT: CPT

## 2025-05-22 PROCEDURE — 63600175 PHARM REV CODE 636 W HCPCS: Performed by: STUDENT IN AN ORGANIZED HEALTH CARE EDUCATION/TRAINING PROGRAM

## 2025-05-22 PROCEDURE — 84443 ASSAY THYROID STIM HORMONE: CPT | Performed by: STUDENT IN AN ORGANIZED HEALTH CARE EDUCATION/TRAINING PROGRAM

## 2025-05-22 PROCEDURE — 96366 THER/PROPH/DIAG IV INF ADDON: CPT

## 2025-05-22 PROCEDURE — 80307 DRUG TEST PRSMV CHEM ANLYZR: CPT | Performed by: STUDENT IN AN ORGANIZED HEALTH CARE EDUCATION/TRAINING PROGRAM

## 2025-05-22 PROCEDURE — 83605 ASSAY OF LACTIC ACID: CPT | Mod: 91 | Performed by: STUDENT IN AN ORGANIZED HEALTH CARE EDUCATION/TRAINING PROGRAM

## 2025-05-22 PROCEDURE — 84484 ASSAY OF TROPONIN QUANT: CPT | Performed by: EMERGENCY MEDICINE

## 2025-05-22 PROCEDURE — 25000003 PHARM REV CODE 250

## 2025-05-22 PROCEDURE — 25000003 PHARM REV CODE 250: Performed by: EMERGENCY MEDICINE

## 2025-05-22 PROCEDURE — 63600175 PHARM REV CODE 636 W HCPCS

## 2025-05-22 PROCEDURE — 83880 ASSAY OF NATRIURETIC PEPTIDE: CPT | Performed by: EMERGENCY MEDICINE

## 2025-05-22 RX ORDER — IBUPROFEN 200 MG
24 TABLET ORAL
Status: DISCONTINUED | OUTPATIENT
Start: 2025-05-22 | End: 2025-06-03 | Stop reason: HOSPADM

## 2025-05-22 RX ORDER — GLUCAGON 1 MG
1 KIT INJECTION
Status: DISCONTINUED | OUTPATIENT
Start: 2025-05-22 | End: 2025-06-03 | Stop reason: HOSPADM

## 2025-05-22 RX ORDER — LEVALBUTEROL 1.25 MG/.5ML
1.25 SOLUTION, CONCENTRATE RESPIRATORY (INHALATION) EVERY 8 HOURS
Status: DISCONTINUED | OUTPATIENT
Start: 2025-05-23 | End: 2025-05-26

## 2025-05-22 RX ORDER — AMITRIPTYLINE HYDROCHLORIDE 25 MG/1
25 TABLET, FILM COATED ORAL NIGHTLY
Status: DISCONTINUED | OUTPATIENT
Start: 2025-05-22 | End: 2025-06-03 | Stop reason: HOSPADM

## 2025-05-22 RX ORDER — ROFLUMILAST 500 UG/1
500 TABLET ORAL EVERY MORNING
Status: DISCONTINUED | OUTPATIENT
Start: 2025-05-23 | End: 2025-06-03 | Stop reason: HOSPADM

## 2025-05-22 RX ORDER — NALOXONE HCL 0.4 MG/ML
0.02 VIAL (ML) INJECTION
Status: DISCONTINUED | OUTPATIENT
Start: 2025-05-22 | End: 2025-06-03 | Stop reason: HOSPADM

## 2025-05-22 RX ORDER — PANTOPRAZOLE SODIUM 40 MG/1
40 TABLET, DELAYED RELEASE ORAL DAILY
Status: DISCONTINUED | OUTPATIENT
Start: 2025-05-23 | End: 2025-06-03 | Stop reason: HOSPADM

## 2025-05-22 RX ORDER — ASPIRIN 81 MG/1
81 TABLET ORAL EVERY MORNING
Status: DISCONTINUED | OUTPATIENT
Start: 2025-05-23 | End: 2025-06-03 | Stop reason: HOSPADM

## 2025-05-22 RX ORDER — ENOXAPARIN SODIUM 100 MG/ML
40 INJECTION SUBCUTANEOUS EVERY 24 HOURS
Status: DISCONTINUED | OUTPATIENT
Start: 2025-05-22 | End: 2025-06-03 | Stop reason: HOSPADM

## 2025-05-22 RX ORDER — ALBUTEROL SULFATE 0.63 MG/3ML
0.63 SOLUTION RESPIRATORY (INHALATION) EVERY 6 HOURS PRN
Status: DISCONTINUED | OUTPATIENT
Start: 2025-05-22 | End: 2025-05-22 | Stop reason: SDUPTHER

## 2025-05-22 RX ORDER — ACETAMINOPHEN 325 MG/1
650 TABLET ORAL EVERY 4 HOURS PRN
Status: DISCONTINUED | OUTPATIENT
Start: 2025-05-22 | End: 2025-05-23 | Stop reason: DRUGHIGH

## 2025-05-22 RX ORDER — ACETAMINOPHEN 500 MG
1000 TABLET ORAL ONCE
Status: COMPLETED | OUTPATIENT
Start: 2025-05-22 | End: 2025-05-22

## 2025-05-22 RX ORDER — FUROSEMIDE 10 MG/ML
40 INJECTION INTRAMUSCULAR; INTRAVENOUS
Status: COMPLETED | OUTPATIENT
Start: 2025-05-22 | End: 2025-05-22

## 2025-05-22 RX ORDER — LOSARTAN POTASSIUM 50 MG/1
100 TABLET ORAL DAILY
Status: DISCONTINUED | OUTPATIENT
Start: 2025-05-23 | End: 2025-05-23

## 2025-05-22 RX ORDER — TRAZODONE HYDROCHLORIDE 50 MG/1
50 TABLET ORAL NIGHTLY
Status: DISCONTINUED | OUTPATIENT
Start: 2025-05-22 | End: 2025-05-23

## 2025-05-22 RX ORDER — SIMETHICONE 80 MG
1 TABLET,CHEWABLE ORAL 4 TIMES DAILY PRN
Status: DISCONTINUED | OUTPATIENT
Start: 2025-05-22 | End: 2025-06-03 | Stop reason: HOSPADM

## 2025-05-22 RX ORDER — OXYCODONE HYDROCHLORIDE 10 MG/1
10 TABLET ORAL EVERY 6 HOURS PRN
Refills: 0 | Status: DISCONTINUED | OUTPATIENT
Start: 2025-05-22 | End: 2025-06-03 | Stop reason: HOSPADM

## 2025-05-22 RX ORDER — ONDANSETRON HYDROCHLORIDE 2 MG/ML
4 INJECTION, SOLUTION INTRAVENOUS EVERY 8 HOURS PRN
Status: DISCONTINUED | OUTPATIENT
Start: 2025-05-22 | End: 2025-06-03 | Stop reason: HOSPADM

## 2025-05-22 RX ORDER — ALBUTEROL SULFATE 2.5 MG/.5ML
0.63 SOLUTION RESPIRATORY (INHALATION) EVERY 6 HOURS PRN
Status: DISCONTINUED | OUTPATIENT
Start: 2025-05-22 | End: 2025-06-03 | Stop reason: HOSPADM

## 2025-05-22 RX ORDER — POLYETHYLENE GLYCOL 3350 17 G/17G
17 POWDER, FOR SOLUTION ORAL DAILY
Status: DISCONTINUED | OUTPATIENT
Start: 2025-05-23 | End: 2025-06-03 | Stop reason: HOSPADM

## 2025-05-22 RX ORDER — SODIUM CHLORIDE 0.9 % (FLUSH) 0.9 %
10 SYRINGE (ML) INJECTION EVERY 12 HOURS PRN
Status: DISCONTINUED | OUTPATIENT
Start: 2025-05-22 | End: 2025-06-03 | Stop reason: HOSPADM

## 2025-05-22 RX ORDER — ATORVASTATIN CALCIUM 40 MG/1
80 TABLET, FILM COATED ORAL DAILY
Status: DISCONTINUED | OUTPATIENT
Start: 2025-05-23 | End: 2025-06-03 | Stop reason: HOSPADM

## 2025-05-22 RX ORDER — ACETAMINOPHEN 325 MG/1
650 TABLET ORAL EVERY 8 HOURS PRN
Status: DISCONTINUED | OUTPATIENT
Start: 2025-05-22 | End: 2025-06-03 | Stop reason: HOSPADM

## 2025-05-22 RX ORDER — IBUPROFEN 200 MG
16 TABLET ORAL
Status: DISCONTINUED | OUTPATIENT
Start: 2025-05-22 | End: 2025-06-03 | Stop reason: HOSPADM

## 2025-05-22 RX ORDER — VANCOMYCIN 1.75 GRAM/500 ML IN 0.9 % SODIUM CHLORIDE INTRAVENOUS
20
Status: COMPLETED | OUTPATIENT
Start: 2025-05-22 | End: 2025-05-22

## 2025-05-22 RX ORDER — IPRATROPIUM BROMIDE AND ALBUTEROL SULFATE 2.5; .5 MG/3ML; MG/3ML
3 SOLUTION RESPIRATORY (INHALATION) EVERY 6 HOURS
Status: DISCONTINUED | OUTPATIENT
Start: 2025-05-22 | End: 2025-05-22

## 2025-05-22 RX ORDER — TALC
6 POWDER (GRAM) TOPICAL NIGHTLY PRN
Status: DISCONTINUED | OUTPATIENT
Start: 2025-05-22 | End: 2025-06-03 | Stop reason: HOSPADM

## 2025-05-22 RX ORDER — OXYCODONE AND ACETAMINOPHEN 5; 325 MG/1; MG/1
2 TABLET ORAL
Refills: 0 | Status: COMPLETED | OUTPATIENT
Start: 2025-05-22 | End: 2025-05-22

## 2025-05-22 RX ORDER — LEVETIRACETAM 500 MG/1
500 TABLET ORAL 2 TIMES DAILY
Status: DISCONTINUED | OUTPATIENT
Start: 2025-05-22 | End: 2025-06-03 | Stop reason: HOSPADM

## 2025-05-22 RX ADMIN — PIPERACILLIN AND TAZOBACTAM 4.5 G: 4; .5 INJECTION, POWDER, LYOPHILIZED, FOR SOLUTION INTRAVENOUS; PARENTERAL at 01:05

## 2025-05-22 RX ADMIN — IOHEXOL 100 ML: 350 INJECTION, SOLUTION INTRAVENOUS at 04:05

## 2025-05-22 RX ADMIN — VANCOMYCIN HYDROCHLORIDE 1750 MG: 10 INJECTION, POWDER, LYOPHILIZED, FOR SOLUTION INTRAVENOUS at 01:05

## 2025-05-22 RX ADMIN — SODIUM CHLORIDE, POTASSIUM CHLORIDE, SODIUM LACTATE AND CALCIUM CHLORIDE 500 ML: 600; 310; 30; 20 INJECTION, SOLUTION INTRAVENOUS at 09:05

## 2025-05-22 RX ADMIN — LEVETIRACETAM 500 MG: 500 TABLET, FILM COATED ORAL at 08:05

## 2025-05-22 RX ADMIN — OXYCODONE HYDROCHLORIDE AND ACETAMINOPHEN 2 TABLET: 5; 325 TABLET ORAL at 02:05

## 2025-05-22 RX ADMIN — LEVALBUTEROL 1.25 MG: 1.25 SOLUTION, CONCENTRATE RESPIRATORY (INHALATION) at 11:05

## 2025-05-22 RX ADMIN — FUROSEMIDE 40 MG: 10 INJECTION, SOLUTION INTRAVENOUS at 12:05

## 2025-05-22 RX ADMIN — ENOXAPARIN SODIUM 40 MG: 40 INJECTION SUBCUTANEOUS at 05:05

## 2025-05-22 RX ADMIN — SODIUM CHLORIDE, SODIUM LACTATE, POTASSIUM CHLORIDE, AND CALCIUM CHLORIDE 1724 ML: .6; .31; .03; .02 INJECTION, SOLUTION INTRAVENOUS at 03:05

## 2025-05-22 RX ADMIN — AMITRIPTYLINE HYDROCHLORIDE 25 MG: 25 TABLET, FILM COATED ORAL at 08:05

## 2025-05-22 RX ADMIN — ACETAMINOPHEN 1000 MG: 500 TABLET ORAL at 04:05

## 2025-05-22 RX ADMIN — ACETAMINOPHEN 650 MG: 325 TABLET ORAL at 09:05

## 2025-05-22 NOTE — TELEPHONE ENCOUNTER
----- Message from Jaimee sent at 5/22/2025  9:26 AM CDT -----  Regarding: Refill  Contact: Marzena  [phone]  Rx Name:oxyCODONE (ROXICODONE) 10 mg Tab immediate release tabletPreferred Pharmacy with number:   [facility] [address] [facility] [place] [place] [address]Ahjwl: [phone] Fax: [phone] Provider:  Yanet Padilla, MDContact preference: Marzena pt's daughter  [phone]/Notes: Requesting refill

## 2025-05-22 NOTE — TELEPHONE ENCOUNTER
----- Message from Erendira sent at 5/22/2025 10:06 AM CDT -----  Regarding: Fluid in Lungs  Contact: Eneida @ [phone]  Pt's Nurse Eneida  is calling to speak to someone in the office to discuss symptoms they are having. Pt is asking for a call back today. Please call to advise. Thanks. Symptoms: Rubbing in upper lobes and left lower lobes, shortness of breathe Oxygen is being used and fluid in lungs ( jail to both thighs) and legs Additional Information: Vitals: Heart Rate 119, Oxygen was 97 with 2L of Oxygen and BP was 98 over 62

## 2025-05-22 NOTE — TELEPHONE ENCOUNTER
Spoke with patients home health nurse, Eneida. Eneida reports pt has 3+ pitting edema to BLE from feet up to mid thigh. Eneida notes crackles in B upper lobes and L lower lobe. Eneida states the patient placed himself on 2 L NC this morning for SOB at rest. [place] satting 97% on 2L NC. HH nurse advised pt on reporting to ED, to which I am in agreement.    Pt upset, but will report to ED now. Provider notified

## 2025-05-23 PROBLEM — J18.9 PNEUMONIA: Status: ACTIVE | Noted: 2025-05-23

## 2025-05-23 LAB
ABSOLUTE EOSINOPHIL (OHS): 0.03 K/UL
ABSOLUTE MONOCYTE (OHS): 0.62 K/UL (ref 0.3–1)
ABSOLUTE NEUTROPHIL COUNT (OHS): 9.39 K/UL (ref 1.8–7.7)
ALBUMIN SERPL BCP-MCNC: 2.3 G/DL (ref 3.5–5.2)
ALP SERPL-CCNC: 104 UNIT/L (ref 40–150)
ALT SERPL W/O P-5'-P-CCNC: 21 UNIT/L (ref 10–44)
ANION GAP (OHS): 10 MMOL/L (ref 8–16)
AORTIC SIZE INDEX (SOV): 1.3 CM/M2
AORTIC SIZE INDEX: 1.3 CM/M2
ASCENDING AORTA: 2.7 CM
AST SERPL-CCNC: 27 UNIT/L (ref 11–45)
AV AREA BY CONTINUOUS VTI: 2.6 CM2
AV INDEX (PROSTH): 0.8
AV LVOT MEAN GRADIENT: 3 MMHG
AV LVOT PEAK GRADIENT: 5 MMHG
AV MEAN GRADIENT: 5 MMHG
AV PEAK GRADIENT: 9 MMHG
AV VALVE AREA BY VELOCITY RATIO: 2.3 CM²
AV VALVE AREA: 2.5 CM2
AV VELOCITY RATIO: 0.73
BASOPHILS # BLD AUTO: 0.03 K/UL
BASOPHILS NFR BLD AUTO: 0.3 %
BILIRUB SERPL-MCNC: 0.8 MG/DL (ref 0.1–1)
BSA FOR ECHO PROCEDURE: 2.16 M2
BUN SERPL-MCNC: 25 MG/DL (ref 8–23)
CALCIUM SERPL-MCNC: 8.8 MG/DL (ref 8.7–10.5)
CHLORIDE SERPL-SCNC: 96 MMOL/L (ref 95–110)
CO2 SERPL-SCNC: 28 MMOL/L (ref 23–29)
CREAT SERPL-MCNC: 1.6 MG/DL (ref 0.5–1.4)
CV ECHO LV RWT: 0.48 CM
DOP CALC AO PEAK VEL: 1.5 M/S
DOP CALC AO VTI: 21.2 CM
DOP CALC LVOT AREA: 3.1 CM2
DOP CALC LVOT DIAMETER: 2 CM
DOP CALC LVOT PEAK VEL: 1.1 M/S
DOP CALC LVOT STROKE VOLUME: 53.1 CM3
DOP CALCLVOT PEAK VEL VTI: 16.9 CM
E WAVE DECELERATION TIME: 121 MS
E/A RATIO: 0.82
E/E' RATIO: 11 M/S
ECHO EF ESTIMATED: 54 %
ECHO LV POSTERIOR WALL: 1 CM (ref 0.6–1.1)
ERYTHROCYTE [DISTWIDTH] IN BLOOD BY AUTOMATED COUNT: 16.5 % (ref 11.5–14.5)
FRACTIONAL SHORTENING: 28.6 % (ref 28–44)
GFR SERPLBLD CREATININE-BSD FMLA CKD-EPI: 44 ML/MIN/1.73/M2
GLUCOSE SERPL-MCNC: 108 MG/DL (ref 70–110)
HCT VFR BLD AUTO: 36.8 % (ref 40–54)
HGB BLD-MCNC: 11.8 GM/DL (ref 14–18)
IMM GRANULOCYTES # BLD AUTO: 0.15 K/UL (ref 0–0.04)
IMM GRANULOCYTES NFR BLD AUTO: 1.4 % (ref 0–0.5)
INTERVENTRICULAR SEPTUM: 0.9 CM (ref 0.6–1.1)
LA MAJOR: 4.7 CM
LA MINOR: 5 CM
LA WIDTH: 3.2 CM
LEFT ATRIUM SIZE: 3.9 CM
LEFT ATRIUM VOLUME INDEX MOD: 18 ML/M2
LEFT ATRIUM VOLUME INDEX: 24 ML/M2
LEFT ATRIUM VOLUME MOD: 39 ML
LEFT ATRIUM VOLUME: 51 CM3
LEFT INTERNAL DIMENSION IN SYSTOLE: 3 CM (ref 2.1–4)
LEFT VENTRICLE DIASTOLIC VOLUME INDEX: 36.32 ML/M2
LEFT VENTRICLE DIASTOLIC VOLUME: 77 ML
LEFT VENTRICLE MASS INDEX: 60.3 G/M2
LEFT VENTRICLE SYSTOLIC VOLUME INDEX: 16.5 ML/M2
LEFT VENTRICLE SYSTOLIC VOLUME: 35 ML
LEFT VENTRICULAR INTERNAL DIMENSION IN DIASTOLE: 4.2 CM (ref 3.5–6)
LEFT VENTRICULAR MASS: 127.8 G
LV LATERAL E/E' RATIO: 11.4
LV SEPTAL E/E' RATIO: 11.4
LYMPHOCYTES # BLD AUTO: 0.23 K/UL (ref 1–4.8)
MAGNESIUM SERPL-MCNC: 1.9 MG/DL (ref 1.6–2.6)
MCH RBC QN AUTO: 28.5 PG (ref 27–31)
MCHC RBC AUTO-ENTMCNC: 32.1 G/DL (ref 32–36)
MCV RBC AUTO: 89 FL (ref 82–98)
MRSA ID BY PCR (OHS): NEGATIVE
MV PEAK A VEL: 0.97 M/S
MV PEAK E VEL: 0.8 M/S
NUCLEATED RBC (/100WBC) (OHS): 0 /100 WBC
OHS CV RV/LV RATIO: 0.76 CM
OHS QRS DURATION: 68 MS
OHS QRS DURATION: 70 MS
OHS QRS DURATION: 82 MS
OHS QTC CALCULATION: 462 MS
OHS QTC CALCULATION: 544 MS
OHS QTC CALCULATION: 601 MS
PHOSPHATE SERPL-MCNC: 3.8 MG/DL (ref 2.7–4.5)
PLATELET # BLD AUTO: 182 K/UL (ref 150–450)
PMV BLD AUTO: 9.9 FL (ref 9.2–12.9)
POTASSIUM SERPL-SCNC: 4.3 MMOL/L (ref 3.5–5.1)
PROT SERPL-MCNC: 6 GM/DL (ref 6–8.4)
RA MAJOR: 4.76 CM
RA PRESSURE ESTIMATED: 3 MMHG
RA WIDTH: 3.02 CM
RBC # BLD AUTO: 4.14 M/UL (ref 4.6–6.2)
RELATIVE EOSINOPHIL (OHS): 0.3 %
RELATIVE LYMPHOCYTE (OHS): 2.2 % (ref 18–48)
RELATIVE MONOCYTE (OHS): 5.9 % (ref 4–15)
RELATIVE NEUTROPHIL (OHS): 89.9 % (ref 38–73)
RIGHT VENTRICLE DIASTOLIC BASEL DIMENSION: 3.2 CM
SINUS: 2.79 CM
SODIUM SERPL-SCNC: 134 MMOL/L (ref 136–145)
STAPH AUREUS ID BY PCR (OHS): NEGATIVE
STJ: 3.2 CM
TDI LATERAL: 0.07 M/S
TDI SEPTAL: 0.07 M/S
TDI: 0.07 M/S
WBC # BLD AUTO: 10.45 K/UL (ref 3.9–12.7)
Z-SCORE OF LEFT VENTRICULAR DIMENSION IN END DIASTOLE: -4.67
Z-SCORE OF LEFT VENTRICULAR DIMENSION IN END SYSTOLE: -2.45

## 2025-05-23 PROCEDURE — 63600175 PHARM REV CODE 636 W HCPCS: Performed by: STUDENT IN AN ORGANIZED HEALTH CARE EDUCATION/TRAINING PROGRAM

## 2025-05-23 PROCEDURE — 85025 COMPLETE CBC W/AUTO DIFF WBC: CPT | Performed by: STUDENT IN AN ORGANIZED HEALTH CARE EDUCATION/TRAINING PROGRAM

## 2025-05-23 PROCEDURE — 94640 AIRWAY INHALATION TREATMENT: CPT | Mod: XB

## 2025-05-23 PROCEDURE — 25000242 PHARM REV CODE 250 ALT 637 W/ HCPCS: Performed by: STUDENT IN AN ORGANIZED HEALTH CARE EDUCATION/TRAINING PROGRAM

## 2025-05-23 PROCEDURE — 25000003 PHARM REV CODE 250: Performed by: STUDENT IN AN ORGANIZED HEALTH CARE EDUCATION/TRAINING PROGRAM

## 2025-05-23 PROCEDURE — 82040 ASSAY OF SERUM ALBUMIN: CPT | Performed by: STUDENT IN AN ORGANIZED HEALTH CARE EDUCATION/TRAINING PROGRAM

## 2025-05-23 PROCEDURE — 83735 ASSAY OF MAGNESIUM: CPT | Performed by: STUDENT IN AN ORGANIZED HEALTH CARE EDUCATION/TRAINING PROGRAM

## 2025-05-23 PROCEDURE — 25500020 PHARM REV CODE 255: Performed by: STUDENT IN AN ORGANIZED HEALTH CARE EDUCATION/TRAINING PROGRAM

## 2025-05-23 PROCEDURE — 99223 1ST HOSP IP/OBS HIGH 75: CPT | Mod: ,,, | Performed by: PODIATRIST

## 2025-05-23 PROCEDURE — 93010 ELECTROCARDIOGRAM REPORT: CPT | Mod: ,,, | Performed by: INTERNAL MEDICINE

## 2025-05-23 PROCEDURE — 84100 ASSAY OF PHOSPHORUS: CPT | Performed by: STUDENT IN AN ORGANIZED HEALTH CARE EDUCATION/TRAINING PROGRAM

## 2025-05-23 PROCEDURE — 36415 COLL VENOUS BLD VENIPUNCTURE: CPT | Performed by: STUDENT IN AN ORGANIZED HEALTH CARE EDUCATION/TRAINING PROGRAM

## 2025-05-23 PROCEDURE — 27000221 HC OXYGEN, UP TO 24 HOURS

## 2025-05-23 PROCEDURE — 94660 CPAP INITIATION&MGMT: CPT

## 2025-05-23 PROCEDURE — 94799 UNLISTED PULMONARY SVC/PX: CPT

## 2025-05-23 PROCEDURE — 99900035 HC TECH TIME PER 15 MIN (STAT)

## 2025-05-23 PROCEDURE — 94761 N-INVAS EAR/PLS OXIMETRY MLT: CPT

## 2025-05-23 PROCEDURE — 5A09357 ASSISTANCE WITH RESPIRATORY VENTILATION, LESS THAN 24 CONSECUTIVE HOURS, CONTINUOUS POSITIVE AIRWAY PRESSURE: ICD-10-PCS

## 2025-05-23 PROCEDURE — 20600001 HC STEP DOWN PRIVATE ROOM

## 2025-05-23 PROCEDURE — 93005 ELECTROCARDIOGRAM TRACING: CPT

## 2025-05-23 PROCEDURE — 27000207 HC ISOLATION

## 2025-05-23 RX ORDER — SODIUM CHLORIDE FOR INHALATION 3 %
4 VIAL, NEBULIZER (ML) INHALATION 2 TIMES DAILY
Status: DISCONTINUED | OUTPATIENT
Start: 2025-05-23 | End: 2025-06-03 | Stop reason: HOSPADM

## 2025-05-23 RX ORDER — DILTIAZEM HYDROCHLORIDE 30 MG/1
60 TABLET, FILM COATED ORAL EVERY 12 HOURS
Status: CANCELLED | OUTPATIENT
Start: 2025-05-23

## 2025-05-23 RX ORDER — GUAIFENESIN 600 MG/1
600 TABLET, EXTENDED RELEASE ORAL 2 TIMES DAILY
Status: DISCONTINUED | OUTPATIENT
Start: 2025-05-23 | End: 2025-05-26

## 2025-05-23 RX ORDER — PREDNISONE 20 MG/1
40 TABLET ORAL DAILY
Status: DISCONTINUED | OUTPATIENT
Start: 2025-05-23 | End: 2025-05-26

## 2025-05-23 RX ORDER — DILTIAZEM HYDROCHLORIDE 30 MG/1
30 TABLET, FILM COATED ORAL EVERY 6 HOURS
Status: DISCONTINUED | OUTPATIENT
Start: 2025-05-23 | End: 2025-05-26

## 2025-05-23 RX ORDER — DILTIAZEM HYDROCHLORIDE 30 MG/1
60 TABLET, FILM COATED ORAL EVERY 12 HOURS
Status: DISCONTINUED | OUTPATIENT
Start: 2025-05-23 | End: 2025-05-23

## 2025-05-23 RX ORDER — SODIUM CHLORIDE, SODIUM LACTATE, POTASSIUM CHLORIDE, CALCIUM CHLORIDE 600; 310; 30; 20 MG/100ML; MG/100ML; MG/100ML; MG/100ML
INJECTION, SOLUTION INTRAVENOUS CONTINUOUS
Status: ACTIVE | OUTPATIENT
Start: 2025-05-23 | End: 2025-05-23

## 2025-05-23 RX ORDER — DOXYCYCLINE HYCLATE 100 MG
100 TABLET ORAL EVERY 12 HOURS
Status: DISCONTINUED | OUTPATIENT
Start: 2025-05-23 | End: 2025-06-01

## 2025-05-23 RX ADMIN — OXYCODONE HYDROCHLORIDE 10 MG: 10 TABLET ORAL at 09:05

## 2025-05-23 RX ADMIN — PANTOPRAZOLE SODIUM 40 MG: 40 TABLET, DELAYED RELEASE ORAL at 08:05

## 2025-05-23 RX ADMIN — DOXYCYCLINE HYCLATE 100 MG: 100 TABLET, FILM COATED ORAL at 09:05

## 2025-05-23 RX ADMIN — SODIUM CHLORIDE SOLN NEBU 3% 4 ML: 3 NEBU SOLN at 08:05

## 2025-05-23 RX ADMIN — OXYCODONE HYDROCHLORIDE 10 MG: 10 TABLET ORAL at 04:05

## 2025-05-23 RX ADMIN — PIPERACILLIN SODIUM AND TAZOBACTAM SODIUM 4.5 G: 4; .5 INJECTION, POWDER, FOR SOLUTION INTRAVENOUS at 11:05

## 2025-05-23 RX ADMIN — LEVETIRACETAM 500 MG: 500 TABLET, FILM COATED ORAL at 08:05

## 2025-05-23 RX ADMIN — DILTIAZEM HYDROCHLORIDE 30 MG: 30 TABLET, FILM COATED ORAL at 09:05

## 2025-05-23 RX ADMIN — GUAIFENESIN 600 MG: 600 TABLET, EXTENDED RELEASE ORAL at 09:05

## 2025-05-23 RX ADMIN — POLYETHYLENE GLYCOL 3350 17 G: 17 POWDER, FOR SOLUTION ORAL at 08:05

## 2025-05-23 RX ADMIN — ACETAMINOPHEN 650 MG: 325 TABLET ORAL at 09:05

## 2025-05-23 RX ADMIN — DOXYCYCLINE HYCLATE 100 MG: 100 TABLET, FILM COATED ORAL at 11:05

## 2025-05-23 RX ADMIN — ATORVASTATIN CALCIUM 80 MG: 40 TABLET, FILM COATED ORAL at 08:05

## 2025-05-23 RX ADMIN — LEVETIRACETAM 500 MG: 500 TABLET, FILM COATED ORAL at 09:05

## 2025-05-23 RX ADMIN — SODIUM CHLORIDE, POTASSIUM CHLORIDE, SODIUM LACTATE AND CALCIUM CHLORIDE: 600; 310; 30; 20 INJECTION, SOLUTION INTRAVENOUS at 01:05

## 2025-05-23 RX ADMIN — LEVALBUTEROL 1.25 MG: 1.25 SOLUTION, CONCENTRATE RESPIRATORY (INHALATION) at 08:05

## 2025-05-23 RX ADMIN — LOSARTAN POTASSIUM 100 MG: 50 TABLET, FILM COATED ORAL at 08:05

## 2025-05-23 RX ADMIN — ENOXAPARIN SODIUM 40 MG: 40 INJECTION SUBCUTANEOUS at 05:05

## 2025-05-23 RX ADMIN — AMITRIPTYLINE HYDROCHLORIDE 25 MG: 25 TABLET, FILM COATED ORAL at 09:05

## 2025-05-23 RX ADMIN — DILTIAZEM HYDROCHLORIDE 30 MG: 30 TABLET, FILM COATED ORAL at 05:05

## 2025-05-23 RX ADMIN — GUAIFENESIN 600 MG: 600 TABLET, EXTENDED RELEASE ORAL at 11:05

## 2025-05-23 RX ADMIN — ASPIRIN 81 MG: 81 TABLET, COATED ORAL at 06:05

## 2025-05-23 RX ADMIN — HUMAN ALBUMIN MICROSPHERES AND PERFLUTREN 0.11 MG: 10; .22 INJECTION, SOLUTION INTRAVENOUS at 10:05

## 2025-05-23 RX ADMIN — PREDNISONE 40 MG: 20 TABLET ORAL at 11:05

## 2025-05-23 RX ADMIN — PIPERACILLIN SODIUM AND TAZOBACTAM SODIUM 4.5 G: 4; .5 INJECTION, POWDER, FOR SOLUTION INTRAVENOUS at 05:05

## 2025-05-23 RX ADMIN — VANCOMYCIN HYDROCHLORIDE 1500 MG: 1.5 INJECTION, POWDER, LYOPHILIZED, FOR SOLUTION INTRAVENOUS at 06:05

## 2025-05-23 RX ADMIN — ACETAMINOPHEN 650 MG: 325 TABLET ORAL at 08:05

## 2025-05-23 RX ADMIN — LEVALBUTEROL 1.25 MG: 1.25 SOLUTION, CONCENTRATE RESPIRATORY (INHALATION) at 04:05

## 2025-05-23 RX ADMIN — ROFLUMILAST 500 MCG: 500 TABLET ORAL at 06:05

## 2025-05-23 RX ADMIN — SODIUM CHLORIDE, POTASSIUM CHLORIDE, SODIUM LACTATE AND CALCIUM CHLORIDE: 600; 310; 30; 20 INJECTION, SOLUTION INTRAVENOUS at 10:05

## 2025-05-24 LAB
ABSOLUTE EOSINOPHIL (OHS): 0 K/UL
ABSOLUTE MONOCYTE (OHS): 0.29 K/UL (ref 0.3–1)
ABSOLUTE NEUTROPHIL COUNT (OHS): 6.54 K/UL (ref 1.8–7.7)
ALBUMIN SERPL BCP-MCNC: 1.9 G/DL (ref 3.5–5.2)
ALP SERPL-CCNC: 84 UNIT/L (ref 40–150)
ALT SERPL W/O P-5'-P-CCNC: 23 UNIT/L (ref 10–44)
ANION GAP (OHS): 12 MMOL/L (ref 8–16)
AST SERPL-CCNC: 25 UNIT/L (ref 11–45)
BASOPHILS # BLD AUTO: 0.02 K/UL
BASOPHILS NFR BLD AUTO: 0.3 %
BILIRUB SERPL-MCNC: 0.4 MG/DL (ref 0.1–1)
BUN SERPL-MCNC: 41 MG/DL (ref 8–23)
CALCIUM SERPL-MCNC: 8.5 MG/DL (ref 8.7–10.5)
CHLORIDE SERPL-SCNC: 98 MMOL/L (ref 95–110)
CO2 SERPL-SCNC: 25 MMOL/L (ref 23–29)
CREAT SERPL-MCNC: 2 MG/DL (ref 0.5–1.4)
ERYTHROCYTE [DISTWIDTH] IN BLOOD BY AUTOMATED COUNT: 16.6 % (ref 11.5–14.5)
GFR SERPLBLD CREATININE-BSD FMLA CKD-EPI: 34 ML/MIN/1.73/M2
GLUCOSE SERPL-MCNC: 148 MG/DL (ref 70–110)
HCT VFR BLD AUTO: 33.3 % (ref 40–54)
HGB BLD-MCNC: 9.9 GM/DL (ref 14–18)
IMM GRANULOCYTES # BLD AUTO: 0.09 K/UL (ref 0–0.04)
IMM GRANULOCYTES NFR BLD AUTO: 1.3 % (ref 0–0.5)
LYMPHOCYTES # BLD AUTO: 0.23 K/UL (ref 1–4.8)
MAGNESIUM SERPL-MCNC: 2.1 MG/DL (ref 1.6–2.6)
MCH RBC QN AUTO: 27.9 PG (ref 27–31)
MCHC RBC AUTO-ENTMCNC: 29.7 G/DL (ref 32–36)
MCV RBC AUTO: 94 FL (ref 82–98)
NUCLEATED RBC (/100WBC) (OHS): 0 /100 WBC
PHOSPHATE SERPL-MCNC: 4.2 MG/DL (ref 2.7–4.5)
PLATELET # BLD AUTO: 173 K/UL (ref 150–450)
PMV BLD AUTO: 9.8 FL (ref 9.2–12.9)
POTASSIUM SERPL-SCNC: 3.9 MMOL/L (ref 3.5–5.1)
PROT SERPL-MCNC: 5.2 GM/DL (ref 6–8.4)
RBC # BLD AUTO: 3.55 M/UL (ref 4.6–6.2)
RELATIVE EOSINOPHIL (OHS): 0 %
RELATIVE LYMPHOCYTE (OHS): 3.2 % (ref 18–48)
RELATIVE MONOCYTE (OHS): 4 % (ref 4–15)
RELATIVE NEUTROPHIL (OHS): 91.2 % (ref 38–73)
SODIUM SERPL-SCNC: 135 MMOL/L (ref 136–145)
VANCOMYCIN SERPL-MCNC: 16.7 UG/ML (ref ?–80)
WBC # BLD AUTO: 7.17 K/UL (ref 3.9–12.7)

## 2025-05-24 PROCEDURE — 94660 CPAP INITIATION&MGMT: CPT

## 2025-05-24 PROCEDURE — 87205 SMEAR GRAM STAIN: CPT | Performed by: STUDENT IN AN ORGANIZED HEALTH CARE EDUCATION/TRAINING PROGRAM

## 2025-05-24 PROCEDURE — 27000207 HC ISOLATION

## 2025-05-24 PROCEDURE — 94640 AIRWAY INHALATION TREATMENT: CPT

## 2025-05-24 PROCEDURE — 87040 BLOOD CULTURE FOR BACTERIA: CPT | Performed by: STUDENT IN AN ORGANIZED HEALTH CARE EDUCATION/TRAINING PROGRAM

## 2025-05-24 PROCEDURE — 94668 MNPJ CHEST WALL SBSQ: CPT

## 2025-05-24 PROCEDURE — 85025 COMPLETE CBC W/AUTO DIFF WBC: CPT | Performed by: STUDENT IN AN ORGANIZED HEALTH CARE EDUCATION/TRAINING PROGRAM

## 2025-05-24 PROCEDURE — 94761 N-INVAS EAR/PLS OXIMETRY MLT: CPT

## 2025-05-24 PROCEDURE — 25000242 PHARM REV CODE 250 ALT 637 W/ HCPCS: Performed by: STUDENT IN AN ORGANIZED HEALTH CARE EDUCATION/TRAINING PROGRAM

## 2025-05-24 PROCEDURE — 94799 UNLISTED PULMONARY SVC/PX: CPT

## 2025-05-24 PROCEDURE — 25000003 PHARM REV CODE 250: Performed by: STUDENT IN AN ORGANIZED HEALTH CARE EDUCATION/TRAINING PROGRAM

## 2025-05-24 PROCEDURE — 63600175 PHARM REV CODE 636 W HCPCS: Performed by: STUDENT IN AN ORGANIZED HEALTH CARE EDUCATION/TRAINING PROGRAM

## 2025-05-24 PROCEDURE — 36415 COLL VENOUS BLD VENIPUNCTURE: CPT | Performed by: STUDENT IN AN ORGANIZED HEALTH CARE EDUCATION/TRAINING PROGRAM

## 2025-05-24 PROCEDURE — 99900035 HC TECH TIME PER 15 MIN (STAT)

## 2025-05-24 PROCEDURE — 20600001 HC STEP DOWN PRIVATE ROOM

## 2025-05-24 PROCEDURE — 80202 ASSAY OF VANCOMYCIN: CPT | Performed by: STUDENT IN AN ORGANIZED HEALTH CARE EDUCATION/TRAINING PROGRAM

## 2025-05-24 PROCEDURE — 27000221 HC OXYGEN, UP TO 24 HOURS

## 2025-05-24 PROCEDURE — 80053 COMPREHEN METABOLIC PANEL: CPT | Performed by: STUDENT IN AN ORGANIZED HEALTH CARE EDUCATION/TRAINING PROGRAM

## 2025-05-24 PROCEDURE — 84100 ASSAY OF PHOSPHORUS: CPT | Performed by: STUDENT IN AN ORGANIZED HEALTH CARE EDUCATION/TRAINING PROGRAM

## 2025-05-24 PROCEDURE — 83735 ASSAY OF MAGNESIUM: CPT | Performed by: STUDENT IN AN ORGANIZED HEALTH CARE EDUCATION/TRAINING PROGRAM

## 2025-05-24 RX ORDER — SODIUM CHLORIDE, SODIUM LACTATE, POTASSIUM CHLORIDE, CALCIUM CHLORIDE 600; 310; 30; 20 MG/100ML; MG/100ML; MG/100ML; MG/100ML
INJECTION, SOLUTION INTRAVENOUS CONTINUOUS
Status: ACTIVE | OUTPATIENT
Start: 2025-05-24 | End: 2025-05-25

## 2025-05-24 RX ORDER — CEFEPIME HYDROCHLORIDE 1 G/1
1 INJECTION, POWDER, FOR SOLUTION INTRAMUSCULAR; INTRAVENOUS
Status: DISCONTINUED | OUTPATIENT
Start: 2025-05-24 | End: 2025-05-25

## 2025-05-24 RX ADMIN — ASPIRIN 81 MG: 81 TABLET, COATED ORAL at 06:05

## 2025-05-24 RX ADMIN — DILTIAZEM HYDROCHLORIDE 30 MG: 30 TABLET, FILM COATED ORAL at 06:05

## 2025-05-24 RX ADMIN — LEVETIRACETAM 500 MG: 500 TABLET, FILM COATED ORAL at 10:05

## 2025-05-24 RX ADMIN — PREDNISONE 40 MG: 20 TABLET ORAL at 10:05

## 2025-05-24 RX ADMIN — DOXYCYCLINE HYCLATE 100 MG: 100 TABLET, FILM COATED ORAL at 09:05

## 2025-05-24 RX ADMIN — ROFLUMILAST 500 MCG: 500 TABLET ORAL at 06:05

## 2025-05-24 RX ADMIN — Medication 6 MG: at 09:05

## 2025-05-24 RX ADMIN — DOXYCYCLINE HYCLATE 100 MG: 100 TABLET, FILM COATED ORAL at 10:05

## 2025-05-24 RX ADMIN — SODIUM CHLORIDE SOLN NEBU 3% 4 ML: 3 NEBU SOLN at 08:05

## 2025-05-24 RX ADMIN — AMITRIPTYLINE HYDROCHLORIDE 25 MG: 25 TABLET, FILM COATED ORAL at 09:05

## 2025-05-24 RX ADMIN — CEFEPIME 1 G: 1 INJECTION, POWDER, FOR SOLUTION INTRAMUSCULAR; INTRAVENOUS at 05:05

## 2025-05-24 RX ADMIN — GUAIFENESIN 600 MG: 600 TABLET, EXTENDED RELEASE ORAL at 09:05

## 2025-05-24 RX ADMIN — LEVALBUTEROL 1.25 MG: 1.25 SOLUTION, CONCENTRATE RESPIRATORY (INHALATION) at 04:05

## 2025-05-24 RX ADMIN — DILTIAZEM HYDROCHLORIDE 30 MG: 30 TABLET, FILM COATED ORAL at 12:05

## 2025-05-24 RX ADMIN — PANTOPRAZOLE SODIUM 40 MG: 40 TABLET, DELAYED RELEASE ORAL at 10:05

## 2025-05-24 RX ADMIN — ATORVASTATIN CALCIUM 80 MG: 40 TABLET, FILM COATED ORAL at 10:05

## 2025-05-24 RX ADMIN — ENOXAPARIN SODIUM 40 MG: 40 INJECTION SUBCUTANEOUS at 05:05

## 2025-05-24 RX ADMIN — LEVETIRACETAM 500 MG: 500 TABLET, FILM COATED ORAL at 09:05

## 2025-05-24 RX ADMIN — POLYETHYLENE GLYCOL 3350 17 G: 17 POWDER, FOR SOLUTION ORAL at 10:05

## 2025-05-24 RX ADMIN — PIPERACILLIN SODIUM AND TAZOBACTAM SODIUM 4.5 G: 4; .5 INJECTION, POWDER, FOR SOLUTION INTRAVENOUS at 02:05

## 2025-05-24 RX ADMIN — VANCOMYCIN HYDROCHLORIDE 1500 MG: 1.5 INJECTION, POWDER, LYOPHILIZED, FOR SOLUTION INTRAVENOUS at 10:05

## 2025-05-24 RX ADMIN — GUAIFENESIN 600 MG: 600 TABLET, EXTENDED RELEASE ORAL at 10:05

## 2025-05-24 RX ADMIN — CEFEPIME 1 G: 1 INJECTION, POWDER, FOR SOLUTION INTRAMUSCULAR; INTRAVENOUS at 10:05

## 2025-05-24 RX ADMIN — LEVALBUTEROL 1.25 MG: 1.25 SOLUTION, CONCENTRATE RESPIRATORY (INHALATION) at 08:05

## 2025-05-24 RX ADMIN — SODIUM CHLORIDE, POTASSIUM CHLORIDE, SODIUM LACTATE AND CALCIUM CHLORIDE: 600; 310; 30; 20 INJECTION, SOLUTION INTRAVENOUS at 03:05

## 2025-05-24 RX ADMIN — DILTIAZEM HYDROCHLORIDE 30 MG: 30 TABLET, FILM COATED ORAL at 05:05

## 2025-05-25 LAB
ABSOLUTE EOSINOPHIL (OHS): 0 K/UL
ABSOLUTE MONOCYTE (OHS): 0.23 K/UL (ref 0.3–1)
ABSOLUTE NEUTROPHIL COUNT (OHS): 5.94 K/UL (ref 1.8–7.7)
ALBUMIN SERPL BCP-MCNC: 2 G/DL (ref 3.5–5.2)
ALP SERPL-CCNC: 92 UNIT/L (ref 40–150)
ALT SERPL W/O P-5'-P-CCNC: 40 UNIT/L (ref 10–44)
ANION GAP (OHS): 10 MMOL/L (ref 8–16)
AST SERPL-CCNC: 43 UNIT/L (ref 11–45)
BACTERIA BLD CULT: ABNORMAL
BACTERIA BLD CULT: ABNORMAL
BASOPHILS # BLD AUTO: 0.01 K/UL
BASOPHILS NFR BLD AUTO: 0.2 %
BILIRUB SERPL-MCNC: 0.3 MG/DL (ref 0.1–1)
BILIRUB UR QL STRIP.AUTO: NEGATIVE
BUN SERPL-MCNC: 38 MG/DL (ref 8–23)
CALCIUM SERPL-MCNC: 8.7 MG/DL (ref 8.7–10.5)
CHLORIDE SERPL-SCNC: 100 MMOL/L (ref 95–110)
CLARITY UR: CLEAR
CO2 SERPL-SCNC: 25 MMOL/L (ref 23–29)
COLOR UR AUTO: YELLOW
CREAT SERPL-MCNC: 1.6 MG/DL (ref 0.5–1.4)
CREAT UR-MCNC: 37 MG/DL (ref 23–375)
ERYTHROCYTE [DISTWIDTH] IN BLOOD BY AUTOMATED COUNT: 16.3 % (ref 11.5–14.5)
GFR SERPLBLD CREATININE-BSD FMLA CKD-EPI: 44 ML/MIN/1.73/M2
GLUCOSE SERPL-MCNC: 184 MG/DL (ref 70–110)
GLUCOSE UR QL STRIP: NEGATIVE
GRAM STN SPEC: ABNORMAL
GRAM STN SPEC: ABNORMAL
HCT VFR BLD AUTO: 30.1 % (ref 40–54)
HGB BLD-MCNC: 9.7 GM/DL (ref 14–18)
HGB UR QL STRIP: NEGATIVE
IMM GRANULOCYTES # BLD AUTO: 0.07 K/UL (ref 0–0.04)
IMM GRANULOCYTES NFR BLD AUTO: 1.1 % (ref 0–0.5)
KETONES UR QL STRIP: NEGATIVE
LEUKOCYTE ESTERASE UR QL STRIP: ABNORMAL
LYMPHOCYTES # BLD AUTO: 0.18 K/UL (ref 1–4.8)
MAGNESIUM SERPL-MCNC: 2 MG/DL (ref 1.6–2.6)
MCH RBC QN AUTO: 28.3 PG (ref 27–31)
MCHC RBC AUTO-ENTMCNC: 32.2 G/DL (ref 32–36)
MCV RBC AUTO: 88 FL (ref 82–98)
MICROSCOPIC COMMENT: NORMAL
NITRITE UR QL STRIP: NEGATIVE
NUCLEATED RBC (/100WBC) (OHS): 0 /100 WBC
PH UR STRIP: 7 [PH]
PHOSPHATE SERPL-MCNC: 2.8 MG/DL (ref 2.7–4.5)
PLATELET # BLD AUTO: 205 K/UL (ref 150–450)
PMV BLD AUTO: 9.9 FL (ref 9.2–12.9)
POTASSIUM SERPL-SCNC: 4.1 MMOL/L (ref 3.5–5.1)
PROT SERPL-MCNC: 5.4 GM/DL (ref 6–8.4)
PROT UR QL STRIP: ABNORMAL
PROT UR-MCNC: 13 MG/DL
PROT/CREAT UR: 0.35 MG/G{CREAT}
RBC # BLD AUTO: 3.43 M/UL (ref 4.6–6.2)
RBC #/AREA URNS AUTO: 2 /HPF (ref 0–4)
RELATIVE EOSINOPHIL (OHS): 0 %
RELATIVE LYMPHOCYTE (OHS): 2.8 % (ref 18–48)
RELATIVE MONOCYTE (OHS): 3.6 % (ref 4–15)
RELATIVE NEUTROPHIL (OHS): 92.3 % (ref 38–73)
SODIUM SERPL-SCNC: 135 MMOL/L (ref 136–145)
SP GR UR STRIP: 1.01
UROBILINOGEN UR STRIP-ACNC: NEGATIVE EU/DL
VANCOMYCIN SERPL-MCNC: 15.9 UG/ML (ref ?–80)
VANCOMYCIN SERPL-MCNC: 22.2 UG/ML (ref ?–80)
WBC # BLD AUTO: 6.43 K/UL (ref 3.9–12.7)
WBC #/AREA URNS AUTO: 4 /HPF (ref 0–5)

## 2025-05-25 PROCEDURE — 94660 CPAP INITIATION&MGMT: CPT

## 2025-05-25 PROCEDURE — 27000221 HC OXYGEN, UP TO 24 HOURS

## 2025-05-25 PROCEDURE — 81003 URINALYSIS AUTO W/O SCOPE: CPT | Performed by: STUDENT IN AN ORGANIZED HEALTH CARE EDUCATION/TRAINING PROGRAM

## 2025-05-25 PROCEDURE — 20600001 HC STEP DOWN PRIVATE ROOM

## 2025-05-25 PROCEDURE — 97530 THERAPEUTIC ACTIVITIES: CPT

## 2025-05-25 PROCEDURE — 94761 N-INVAS EAR/PLS OXIMETRY MLT: CPT

## 2025-05-25 PROCEDURE — 99900035 HC TECH TIME PER 15 MIN (STAT)

## 2025-05-25 PROCEDURE — 97162 PT EVAL MOD COMPLEX 30 MIN: CPT

## 2025-05-25 PROCEDURE — 25000242 PHARM REV CODE 250 ALT 637 W/ HCPCS: Performed by: STUDENT IN AN ORGANIZED HEALTH CARE EDUCATION/TRAINING PROGRAM

## 2025-05-25 PROCEDURE — 94640 AIRWAY INHALATION TREATMENT: CPT

## 2025-05-25 PROCEDURE — 84100 ASSAY OF PHOSPHORUS: CPT | Performed by: STUDENT IN AN ORGANIZED HEALTH CARE EDUCATION/TRAINING PROGRAM

## 2025-05-25 PROCEDURE — 36415 COLL VENOUS BLD VENIPUNCTURE: CPT | Performed by: STUDENT IN AN ORGANIZED HEALTH CARE EDUCATION/TRAINING PROGRAM

## 2025-05-25 PROCEDURE — 87106 FUNGI IDENTIFICATION YEAST: CPT | Performed by: STUDENT IN AN ORGANIZED HEALTH CARE EDUCATION/TRAINING PROGRAM

## 2025-05-25 PROCEDURE — 94664 DEMO&/EVAL PT USE INHALER: CPT

## 2025-05-25 PROCEDURE — 84156 ASSAY OF PROTEIN URINE: CPT | Performed by: STUDENT IN AN ORGANIZED HEALTH CARE EDUCATION/TRAINING PROGRAM

## 2025-05-25 PROCEDURE — 27000646 HC AEROBIKA DEVICE

## 2025-05-25 PROCEDURE — 94668 MNPJ CHEST WALL SBSQ: CPT

## 2025-05-25 PROCEDURE — 25000003 PHARM REV CODE 250: Performed by: STUDENT IN AN ORGANIZED HEALTH CARE EDUCATION/TRAINING PROGRAM

## 2025-05-25 PROCEDURE — 63600175 PHARM REV CODE 636 W HCPCS: Performed by: STUDENT IN AN ORGANIZED HEALTH CARE EDUCATION/TRAINING PROGRAM

## 2025-05-25 PROCEDURE — 83735 ASSAY OF MAGNESIUM: CPT | Performed by: STUDENT IN AN ORGANIZED HEALTH CARE EDUCATION/TRAINING PROGRAM

## 2025-05-25 PROCEDURE — 27000207 HC ISOLATION

## 2025-05-25 PROCEDURE — 85025 COMPLETE CBC W/AUTO DIFF WBC: CPT | Performed by: STUDENT IN AN ORGANIZED HEALTH CARE EDUCATION/TRAINING PROGRAM

## 2025-05-25 PROCEDURE — 97165 OT EVAL LOW COMPLEX 30 MIN: CPT

## 2025-05-25 PROCEDURE — 94799 UNLISTED PULMONARY SVC/PX: CPT

## 2025-05-25 PROCEDURE — 80202 ASSAY OF VANCOMYCIN: CPT | Performed by: STUDENT IN AN ORGANIZED HEALTH CARE EDUCATION/TRAINING PROGRAM

## 2025-05-25 PROCEDURE — 80053 COMPREHEN METABOLIC PANEL: CPT | Performed by: STUDENT IN AN ORGANIZED HEALTH CARE EDUCATION/TRAINING PROGRAM

## 2025-05-25 PROCEDURE — 97535 SELF CARE MNGMENT TRAINING: CPT

## 2025-05-25 RX ORDER — CEFEPIME HYDROCHLORIDE 1 G/1
1 INJECTION, POWDER, FOR SOLUTION INTRAMUSCULAR; INTRAVENOUS
Status: DISCONTINUED | OUTPATIENT
Start: 2025-05-25 | End: 2025-05-26

## 2025-05-25 RX ORDER — AMOXICILLIN AND CLAVULANATE POTASSIUM 875; 125 MG/1; MG/1
1 TABLET, FILM COATED ORAL EVERY 12 HOURS
Status: DISCONTINUED | OUTPATIENT
Start: 2025-05-25 | End: 2025-05-25

## 2025-05-25 RX ADMIN — POLYETHYLENE GLYCOL 3350 17 G: 17 POWDER, FOR SOLUTION ORAL at 10:05

## 2025-05-25 RX ADMIN — CEFEPIME 1 G: 1 INJECTION, POWDER, FOR SOLUTION INTRAMUSCULAR; INTRAVENOUS at 10:05

## 2025-05-25 RX ADMIN — PREDNISONE 40 MG: 20 TABLET ORAL at 10:05

## 2025-05-25 RX ADMIN — GUAIFENESIN 600 MG: 600 TABLET, EXTENDED RELEASE ORAL at 10:05

## 2025-05-25 RX ADMIN — Medication 6 MG: at 09:05

## 2025-05-25 RX ADMIN — GUAIFENESIN 600 MG: 600 TABLET, EXTENDED RELEASE ORAL at 09:05

## 2025-05-25 RX ADMIN — DILTIAZEM HYDROCHLORIDE 30 MG: 30 TABLET, FILM COATED ORAL at 05:05

## 2025-05-25 RX ADMIN — SODIUM CHLORIDE SOLN NEBU 3% 4 ML: 3 NEBU SOLN at 07:05

## 2025-05-25 RX ADMIN — DOXYCYCLINE HYCLATE 100 MG: 100 TABLET, FILM COATED ORAL at 10:05

## 2025-05-25 RX ADMIN — LEVALBUTEROL 1.25 MG: 1.25 SOLUTION, CONCENTRATE RESPIRATORY (INHALATION) at 02:05

## 2025-05-25 RX ADMIN — DILTIAZEM HYDROCHLORIDE 30 MG: 30 TABLET, FILM COATED ORAL at 12:05

## 2025-05-25 RX ADMIN — ROFLUMILAST 500 MCG: 500 TABLET ORAL at 06:05

## 2025-05-25 RX ADMIN — AMITRIPTYLINE HYDROCHLORIDE 25 MG: 25 TABLET, FILM COATED ORAL at 09:05

## 2025-05-25 RX ADMIN — LEVETIRACETAM 500 MG: 500 TABLET, FILM COATED ORAL at 09:05

## 2025-05-25 RX ADMIN — DILTIAZEM HYDROCHLORIDE 30 MG: 30 TABLET, FILM COATED ORAL at 01:05

## 2025-05-25 RX ADMIN — PANTOPRAZOLE SODIUM 40 MG: 40 TABLET, DELAYED RELEASE ORAL at 10:05

## 2025-05-25 RX ADMIN — LEVALBUTEROL 1.25 MG: 1.25 SOLUTION, CONCENTRATE RESPIRATORY (INHALATION) at 07:05

## 2025-05-25 RX ADMIN — ATORVASTATIN CALCIUM 80 MG: 40 TABLET, FILM COATED ORAL at 10:05

## 2025-05-25 RX ADMIN — ASPIRIN 81 MG: 81 TABLET, COATED ORAL at 06:05

## 2025-05-25 RX ADMIN — DOXYCYCLINE HYCLATE 100 MG: 100 TABLET, FILM COATED ORAL at 09:05

## 2025-05-25 RX ADMIN — CEFEPIME 1 G: 1 INJECTION, POWDER, FOR SOLUTION INTRAMUSCULAR; INTRAVENOUS at 01:05

## 2025-05-25 RX ADMIN — LEVETIRACETAM 500 MG: 500 TABLET, FILM COATED ORAL at 10:05

## 2025-05-25 RX ADMIN — CEFEPIME 1 G: 1 INJECTION, POWDER, FOR SOLUTION INTRAMUSCULAR; INTRAVENOUS at 05:05

## 2025-05-25 RX ADMIN — ENOXAPARIN SODIUM 40 MG: 40 INJECTION SUBCUTANEOUS at 05:05

## 2025-05-25 RX ADMIN — DILTIAZEM HYDROCHLORIDE 30 MG: 30 TABLET, FILM COATED ORAL at 06:05

## 2025-05-26 ENCOUNTER — DOCUMENTATION ONLY (OUTPATIENT)
Dept: AUDIOLOGY | Facility: CLINIC | Age: 80
End: 2025-05-26
Payer: MEDICARE

## 2025-05-26 PROBLEM — B34.8 PARAINFLUENZA VIRUS INFECTION: Status: ACTIVE | Noted: 2025-05-26

## 2025-05-26 LAB
ABSOLUTE EOSINOPHIL (OHS): 0 K/UL
ABSOLUTE MONOCYTE (OHS): 0.51 K/UL (ref 0.3–1)
ABSOLUTE NEUTROPHIL COUNT (OHS): 5.68 K/UL (ref 1.8–7.7)
ALBUMIN SERPL BCP-MCNC: 2.3 G/DL (ref 3.5–5.2)
ALLENS TEST: ABNORMAL
ALP SERPL-CCNC: 94 UNIT/L (ref 40–150)
ALT SERPL W/O P-5'-P-CCNC: 50 UNIT/L (ref 10–44)
ANION GAP (OHS): 9 MMOL/L (ref 8–16)
AST SERPL-CCNC: 36 UNIT/L (ref 11–45)
BACTERIA SPEC CULT: ABNORMAL
BASOPHILS # BLD AUTO: 0.02 K/UL
BASOPHILS NFR BLD AUTO: 0.3 %
BILIRUB SERPL-MCNC: 0.3 MG/DL (ref 0.1–1)
BUN SERPL-MCNC: 31 MG/DL (ref 8–23)
CALCIUM SERPL-MCNC: 9.4 MG/DL (ref 8.7–10.5)
CHLORIDE SERPL-SCNC: 105 MMOL/L (ref 95–110)
CO2 SERPL-SCNC: 26 MMOL/L (ref 23–29)
CREAT SERPL-MCNC: 1.1 MG/DL (ref 0.5–1.4)
DELSYS: ABNORMAL
ERYTHROCYTE [DISTWIDTH] IN BLOOD BY AUTOMATED COUNT: 16.1 % (ref 11.5–14.5)
GFR SERPLBLD CREATININE-BSD FMLA CKD-EPI: >60 ML/MIN/1.73/M2
GLUCOSE SERPL-MCNC: 129 MG/DL (ref 70–110)
GRAM STN SPEC: ABNORMAL
HCO3 UR-SCNC: 28.8 MMOL/L (ref 24–28)
HCT VFR BLD AUTO: 34.1 % (ref 40–54)
HCT VFR BLD CALC: 33 %PCV (ref 36–54)
HGB BLD-MCNC: 10.9 GM/DL (ref 14–18)
IMM GRANULOCYTES # BLD AUTO: 0.16 K/UL (ref 0–0.04)
IMM GRANULOCYTES NFR BLD AUTO: 2.4 % (ref 0–0.5)
LACTATE SERPL-SCNC: 1.7 MMOL/L (ref 0.5–2.2)
LYMPHOCYTES # BLD AUTO: 0.25 K/UL (ref 1–4.8)
MAGNESIUM SERPL-MCNC: 1.8 MG/DL (ref 1.6–2.6)
MCH RBC QN AUTO: 28.3 PG (ref 27–31)
MCHC RBC AUTO-ENTMCNC: 32 G/DL (ref 32–36)
MCV RBC AUTO: 89 FL (ref 82–98)
NUCLEATED RBC (/100WBC) (OHS): 0 /100 WBC
PCO2 BLDA: 45.7 MMHG (ref 35–45)
PH SMN: 7.41 [PH] (ref 7.35–7.45)
PLATELET # BLD AUTO: 234 K/UL (ref 150–450)
PMV BLD AUTO: 9.5 FL (ref 9.2–12.9)
PO2 BLDA: 65 MMHG (ref 80–100)
POC BE: 4 MMOL/L (ref -2–2)
POC IONIZED CALCIUM: 1.34 MMOL/L (ref 1.06–1.42)
POC SATURATED O2: 92 % (ref 95–100)
POC TCO2: 30 MMOL/L (ref 23–27)
POTASSIUM BLD-SCNC: 3.3 MMOL/L (ref 3.5–5.1)
POTASSIUM SERPL-SCNC: 3.6 MMOL/L (ref 3.5–5.1)
PROT SERPL-MCNC: 5.8 GM/DL (ref 6–8.4)
RBC # BLD AUTO: 3.85 M/UL (ref 4.6–6.2)
RELATIVE EOSINOPHIL (OHS): 0 %
RELATIVE LYMPHOCYTE (OHS): 3.8 % (ref 18–48)
RELATIVE MONOCYTE (OHS): 7.7 % (ref 4–15)
RELATIVE NEUTROPHIL (OHS): 85.8 % (ref 38–73)
SAMPLE: ABNORMAL
SITE: ABNORMAL
SODIUM BLD-SCNC: 139 MMOL/L (ref 136–145)
SODIUM SERPL-SCNC: 140 MMOL/L (ref 136–145)
WBC # BLD AUTO: 6.62 K/UL (ref 3.9–12.7)

## 2025-05-26 PROCEDURE — 94761 N-INVAS EAR/PLS OXIMETRY MLT: CPT

## 2025-05-26 PROCEDURE — 25000242 PHARM REV CODE 250 ALT 637 W/ HCPCS: Performed by: STUDENT IN AN ORGANIZED HEALTH CARE EDUCATION/TRAINING PROGRAM

## 2025-05-26 PROCEDURE — 5A0935A ASSISTANCE WITH RESPIRATORY VENTILATION, LESS THAN 24 CONSECUTIVE HOURS, HIGH NASAL FLOW/VELOCITY: ICD-10-PCS

## 2025-05-26 PROCEDURE — 63600175 PHARM REV CODE 636 W HCPCS: Performed by: STUDENT IN AN ORGANIZED HEALTH CARE EDUCATION/TRAINING PROGRAM

## 2025-05-26 PROCEDURE — 36600 WITHDRAWAL OF ARTERIAL BLOOD: CPT

## 2025-05-26 PROCEDURE — 27000221 HC OXYGEN, UP TO 24 HOURS

## 2025-05-26 PROCEDURE — 27100171 HC OXYGEN HIGH FLOW UP TO 24 HOURS

## 2025-05-26 PROCEDURE — 94799 UNLISTED PULMONARY SVC/PX: CPT

## 2025-05-26 PROCEDURE — 93005 ELECTROCARDIOGRAM TRACING: CPT

## 2025-05-26 PROCEDURE — 99900035 HC TECH TIME PER 15 MIN (STAT)

## 2025-05-26 PROCEDURE — 99499 UNLISTED E&M SERVICE: CPT | Mod: ICN,,, | Performed by: INTERNAL MEDICINE

## 2025-05-26 PROCEDURE — 84520 ASSAY OF UREA NITROGEN: CPT | Performed by: STUDENT IN AN ORGANIZED HEALTH CARE EDUCATION/TRAINING PROGRAM

## 2025-05-26 PROCEDURE — 85025 COMPLETE CBC W/AUTO DIFF WBC: CPT | Performed by: STUDENT IN AN ORGANIZED HEALTH CARE EDUCATION/TRAINING PROGRAM

## 2025-05-26 PROCEDURE — 94668 MNPJ CHEST WALL SBSQ: CPT

## 2025-05-26 PROCEDURE — 94660 CPAP INITIATION&MGMT: CPT

## 2025-05-26 PROCEDURE — 83735 ASSAY OF MAGNESIUM: CPT | Performed by: STUDENT IN AN ORGANIZED HEALTH CARE EDUCATION/TRAINING PROGRAM

## 2025-05-26 PROCEDURE — 82803 BLOOD GASES ANY COMBINATION: CPT

## 2025-05-26 PROCEDURE — 25000003 PHARM REV CODE 250: Performed by: STUDENT IN AN ORGANIZED HEALTH CARE EDUCATION/TRAINING PROGRAM

## 2025-05-26 PROCEDURE — 27000207 HC ISOLATION

## 2025-05-26 PROCEDURE — 94640 AIRWAY INHALATION TREATMENT: CPT

## 2025-05-26 PROCEDURE — 36415 COLL VENOUS BLD VENIPUNCTURE: CPT | Performed by: STUDENT IN AN ORGANIZED HEALTH CARE EDUCATION/TRAINING PROGRAM

## 2025-05-26 PROCEDURE — 83605 ASSAY OF LACTIC ACID: CPT | Performed by: STUDENT IN AN ORGANIZED HEALTH CARE EDUCATION/TRAINING PROGRAM

## 2025-05-26 PROCEDURE — 25000003 PHARM REV CODE 250

## 2025-05-26 PROCEDURE — 94664 DEMO&/EVAL PT USE INHALER: CPT

## 2025-05-26 PROCEDURE — 99291 CRITICAL CARE FIRST HOUR: CPT | Mod: ,,, | Performed by: PHYSICIAN ASSISTANT

## 2025-05-26 PROCEDURE — 20600001 HC STEP DOWN PRIVATE ROOM

## 2025-05-26 PROCEDURE — 93010 ELECTROCARDIOGRAM REPORT: CPT | Mod: ,,, | Performed by: INTERNAL MEDICINE

## 2025-05-26 RX ORDER — PROMETHAZINE HYDROCHLORIDE AND CODEINE PHOSPHATE 6.25; 1 MG/5ML; MG/5ML
5 SOLUTION ORAL EVERY 4 HOURS PRN
Status: DISCONTINUED | OUTPATIENT
Start: 2025-05-26 | End: 2025-06-03 | Stop reason: HOSPADM

## 2025-05-26 RX ORDER — POTASSIUM CHLORIDE 20 MEQ/1
40 TABLET, EXTENDED RELEASE ORAL ONCE
Status: COMPLETED | OUTPATIENT
Start: 2025-05-26 | End: 2025-05-26

## 2025-05-26 RX ORDER — DILTIAZEM HYDROCHLORIDE 5 MG/ML
5 INJECTION INTRAVENOUS ONCE
Status: DISCONTINUED | OUTPATIENT
Start: 2025-05-26 | End: 2025-05-26

## 2025-05-26 RX ORDER — FUROSEMIDE 10 MG/ML
60 INJECTION INTRAMUSCULAR; INTRAVENOUS ONCE
Status: COMPLETED | OUTPATIENT
Start: 2025-05-26 | End: 2025-05-26

## 2025-05-26 RX ORDER — FUROSEMIDE 10 MG/ML
40 INJECTION INTRAMUSCULAR; INTRAVENOUS ONCE
Status: DISCONTINUED | OUTPATIENT
Start: 2025-05-26 | End: 2025-05-26

## 2025-05-26 RX ORDER — LEVALBUTEROL 1.25 MG/.5ML
1.25 SOLUTION, CONCENTRATE RESPIRATORY (INHALATION) EVERY 6 HOURS
Status: DISCONTINUED | OUTPATIENT
Start: 2025-05-26 | End: 2025-05-26

## 2025-05-26 RX ORDER — DILTIAZEM HYDROCHLORIDE 30 MG/1
60 TABLET, FILM COATED ORAL EVERY 6 HOURS
Status: DISCONTINUED | OUTPATIENT
Start: 2025-05-26 | End: 2025-05-26

## 2025-05-26 RX ORDER — LEVALBUTEROL 1.25 MG/.5ML
1.25 SOLUTION, CONCENTRATE RESPIRATORY (INHALATION) EVERY 6 HOURS
Status: DISCONTINUED | OUTPATIENT
Start: 2025-05-26 | End: 2025-05-27

## 2025-05-26 RX ORDER — DILTIAZEM HYDROCHLORIDE 30 MG/1
120 TABLET, FILM COATED ORAL EVERY 8 HOURS
Status: DISCONTINUED | OUTPATIENT
Start: 2025-05-26 | End: 2025-05-30

## 2025-05-26 RX ORDER — GUAIFENESIN 600 MG/1
1200 TABLET, EXTENDED RELEASE ORAL 2 TIMES DAILY
Status: DISCONTINUED | OUTPATIENT
Start: 2025-05-26 | End: 2025-06-03 | Stop reason: HOSPADM

## 2025-05-26 RX ORDER — IPRATROPIUM BROMIDE 0.5 MG/2.5ML
0.5 SOLUTION RESPIRATORY (INHALATION) EVERY 6 HOURS
Status: DISCONTINUED | OUTPATIENT
Start: 2025-05-26 | End: 2025-05-27

## 2025-05-26 RX ORDER — AMOXICILLIN AND CLAVULANATE POTASSIUM 875; 125 MG/1; MG/1
1 TABLET, FILM COATED ORAL EVERY 12 HOURS
Status: COMPLETED | OUTPATIENT
Start: 2025-05-26 | End: 2025-06-01

## 2025-05-26 RX ORDER — DILTIAZEM HYDROCHLORIDE 5 MG/ML
10 INJECTION INTRAVENOUS ONCE
Status: DISCONTINUED | OUTPATIENT
Start: 2025-05-26 | End: 2025-05-26

## 2025-05-26 RX ORDER — MAGNESIUM SULFATE HEPTAHYDRATE 40 MG/ML
2 INJECTION, SOLUTION INTRAVENOUS ONCE
Status: COMPLETED | OUTPATIENT
Start: 2025-05-26 | End: 2025-05-26

## 2025-05-26 RX ORDER — DILTIAZEM HYDROCHLORIDE 30 MG/1
60 TABLET, FILM COATED ORAL ONCE
Status: COMPLETED | OUTPATIENT
Start: 2025-05-26 | End: 2025-05-26

## 2025-05-26 RX ADMIN — MAGNESIUM SULFATE HEPTAHYDRATE 2 G: 40 INJECTION, SOLUTION INTRAVENOUS at 12:05

## 2025-05-26 RX ADMIN — POTASSIUM CHLORIDE 40 MEQ: 1500 TABLET, EXTENDED RELEASE ORAL at 10:05

## 2025-05-26 RX ADMIN — LEVALBUTEROL 1.25 MG: 1.25 SOLUTION, CONCENTRATE RESPIRATORY (INHALATION) at 07:05

## 2025-05-26 RX ADMIN — CEFEPIME 1 G: 1 INJECTION, POWDER, FOR SOLUTION INTRAMUSCULAR; INTRAVENOUS at 10:05

## 2025-05-26 RX ADMIN — DILTIAZEM HYDROCHLORIDE 30 MG: 30 TABLET, FILM COATED ORAL at 12:05

## 2025-05-26 RX ADMIN — METHYLPREDNISOLONE SODIUM SUCCINATE 60 MG: 40 INJECTION, POWDER, FOR SOLUTION INTRAMUSCULAR; INTRAVENOUS at 12:05

## 2025-05-26 RX ADMIN — SODIUM CHLORIDE SOLN NEBU 3% 4 ML: 3 NEBU SOLN at 07:05

## 2025-05-26 RX ADMIN — DILTIAZEM HYDROCHLORIDE 120 MG: 30 TABLET, FILM COATED ORAL at 09:05

## 2025-05-26 RX ADMIN — ENOXAPARIN SODIUM 40 MG: 40 INJECTION SUBCUTANEOUS at 05:05

## 2025-05-26 RX ADMIN — Medication 6 MG: at 08:05

## 2025-05-26 RX ADMIN — PROMETHAZINE HYDROCHLORIDE AND CODEINE PHOSPHATE 5 ML: 6.25; 1 SOLUTION ORAL at 04:05

## 2025-05-26 RX ADMIN — LEVETIRACETAM 500 MG: 500 TABLET, FILM COATED ORAL at 08:05

## 2025-05-26 RX ADMIN — ROFLUMILAST 500 MCG: 500 TABLET ORAL at 06:05

## 2025-05-26 RX ADMIN — AMOXICILLIN AND CLAVULANATE POTASSIUM 1 TABLET: 875; 125 TABLET, FILM COATED ORAL at 08:05

## 2025-05-26 RX ADMIN — TIOTROPIUM BROMIDE INHALATION SPRAY 2 PUFF: 3.12 SPRAY, METERED RESPIRATORY (INHALATION) at 11:05

## 2025-05-26 RX ADMIN — DOXYCYCLINE HYCLATE 100 MG: 100 TABLET, FILM COATED ORAL at 08:05

## 2025-05-26 RX ADMIN — POTASSIUM CHLORIDE 40 MEQ: 1500 TABLET, EXTENDED RELEASE ORAL at 01:05

## 2025-05-26 RX ADMIN — AMOXICILLIN AND CLAVULANATE POTASSIUM 1 TABLET: 875; 125 TABLET, FILM COATED ORAL at 10:05

## 2025-05-26 RX ADMIN — IPRATROPIUM BROMIDE 0.5 MG: 0.5 SOLUTION RESPIRATORY (INHALATION) at 02:05

## 2025-05-26 RX ADMIN — DILTIAZEM HYDROCHLORIDE 30 MG: 30 TABLET, FILM COATED ORAL at 06:05

## 2025-05-26 RX ADMIN — PANTOPRAZOLE SODIUM 40 MG: 40 TABLET, DELAYED RELEASE ORAL at 08:05

## 2025-05-26 RX ADMIN — SODIUM CHLORIDE SOLN NEBU 3% 4 ML: 3 NEBU SOLN at 09:05

## 2025-05-26 RX ADMIN — ALBUTEROL SULFATE 0.63 MG: 2.5 SOLUTION RESPIRATORY (INHALATION) at 04:05

## 2025-05-26 RX ADMIN — DILTIAZEM HYDROCHLORIDE 60 MG: 30 TABLET, FILM COATED ORAL at 10:05

## 2025-05-26 RX ADMIN — FUROSEMIDE 60 MG: 10 INJECTION, SOLUTION INTRAMUSCULAR; INTRAVENOUS at 09:05

## 2025-05-26 RX ADMIN — ASPIRIN 81 MG: 81 TABLET, COATED ORAL at 06:05

## 2025-05-26 RX ADMIN — CEFEPIME 1 G: 1 INJECTION, POWDER, FOR SOLUTION INTRAMUSCULAR; INTRAVENOUS at 03:05

## 2025-05-26 RX ADMIN — GUAIFENESIN 1200 MG: 600 TABLET, EXTENDED RELEASE ORAL at 08:05

## 2025-05-26 RX ADMIN — AMITRIPTYLINE HYDROCHLORIDE 25 MG: 25 TABLET, FILM COATED ORAL at 08:05

## 2025-05-26 RX ADMIN — DILTIAZEM HYDROCHLORIDE 60 MG: 30 TABLET, FILM COATED ORAL at 12:05

## 2025-05-26 RX ADMIN — IPRATROPIUM BROMIDE 0.5 MG: 0.5 SOLUTION RESPIRATORY (INHALATION) at 07:05

## 2025-05-26 RX ADMIN — PREDNISONE 40 MG: 20 TABLET ORAL at 08:05

## 2025-05-26 RX ADMIN — PROMETHAZINE HYDROCHLORIDE AND CODEINE PHOSPHATE 5 ML: 6.25; 1 SOLUTION ORAL at 08:05

## 2025-05-26 RX ADMIN — PROMETHAZINE HYDROCHLORIDE AND CODEINE PHOSPHATE 5 ML: 6.25; 1 SOLUTION ORAL at 10:05

## 2025-05-26 RX ADMIN — VANCOMYCIN HYDROCHLORIDE 1500 MG: 1.5 INJECTION, POWDER, LYOPHILIZED, FOR SOLUTION INTRAVENOUS at 08:05

## 2025-05-26 RX ADMIN — ATORVASTATIN CALCIUM 80 MG: 40 TABLET, FILM COATED ORAL at 08:05

## 2025-05-26 RX ADMIN — GUAIFENESIN 600 MG: 600 TABLET, EXTENDED RELEASE ORAL at 08:05

## 2025-05-26 NOTE — PROGRESS NOTES
Patient's daughter called to report that Mr. Allen's right hearing aid was lost at [facility]. Patient was seen in the Emergency Department and the Cardio Stepdown Floor, but patient's daughter is unsure at which point during care the hearing aid was lost. Patient's daughter was directed to patient advocacy to report the lost hearing aid. I will order a replacement hearing aid and notify Virgilio Dsouza, CCC-A with [employer] regarding the nature of the loss. Patient will be contacted when the replacement device arrives in clinic.    Bebe Cerda, CCC-A

## 2025-05-27 LAB
ABSOLUTE EOSINOPHIL (OHS): 0 K/UL
ABSOLUTE MONOCYTE (OHS): 0.15 K/UL (ref 0.3–1)
ABSOLUTE NEUTROPHIL COUNT (OHS): 4.78 K/UL (ref 1.8–7.7)
ALBUMIN SERPL BCP-MCNC: 2.5 G/DL (ref 3.5–5.2)
ALP SERPL-CCNC: 98 UNIT/L (ref 40–150)
ALT SERPL W/O P-5'-P-CCNC: 44 UNIT/L (ref 10–44)
ANION GAP (OHS): 12 MMOL/L (ref 8–16)
AST SERPL-CCNC: 26 UNIT/L (ref 11–45)
BACTERIA BLD CULT: NORMAL
BASOPHILS # BLD AUTO: 0.01 K/UL
BASOPHILS NFR BLD AUTO: 0.2 %
BILIRUB SERPL-MCNC: 0.5 MG/DL (ref 0.1–1)
BUN SERPL-MCNC: 31 MG/DL (ref 8–23)
CALCIUM SERPL-MCNC: 9.4 MG/DL (ref 8.7–10.5)
CHLORIDE SERPL-SCNC: 104 MMOL/L (ref 95–110)
CO2 SERPL-SCNC: 26 MMOL/L (ref 23–29)
CREAT SERPL-MCNC: 1 MG/DL (ref 0.5–1.4)
ERYTHROCYTE [DISTWIDTH] IN BLOOD BY AUTOMATED COUNT: 16 % (ref 11.5–14.5)
GFR SERPLBLD CREATININE-BSD FMLA CKD-EPI: >60 ML/MIN/1.73/M2
GLUCOSE SERPL-MCNC: 159 MG/DL (ref 70–110)
HCT VFR BLD AUTO: 37.2 % (ref 40–54)
HGB BLD-MCNC: 12.1 GM/DL (ref 14–18)
IMM GRANULOCYTES # BLD AUTO: 0.22 K/UL (ref 0–0.04)
IMM GRANULOCYTES NFR BLD AUTO: 4.1 % (ref 0–0.5)
LYMPHOCYTES # BLD AUTO: 0.22 K/UL (ref 1–4.8)
MAGNESIUM SERPL-MCNC: 1.9 MG/DL (ref 1.6–2.6)
MCH RBC QN AUTO: 28.7 PG (ref 27–31)
MCHC RBC AUTO-ENTMCNC: 32.5 G/DL (ref 32–36)
MCV RBC AUTO: 88 FL (ref 82–98)
NUCLEATED RBC (/100WBC) (OHS): 0 /100 WBC
OHS QRS DURATION: 70 MS
OHS QTC CALCULATION: 449 MS
PHOSPHATE SERPL-MCNC: 2.5 MG/DL (ref 2.7–4.5)
PLATELET # BLD AUTO: 241 K/UL (ref 150–450)
PMV BLD AUTO: 9.4 FL (ref 9.2–12.9)
POTASSIUM SERPL-SCNC: 4.2 MMOL/L (ref 3.5–5.1)
PROT SERPL-MCNC: 6 GM/DL (ref 6–8.4)
RBC # BLD AUTO: 4.22 M/UL (ref 4.6–6.2)
RELATIVE EOSINOPHIL (OHS): 0 %
RELATIVE LYMPHOCYTE (OHS): 4.1 % (ref 18–48)
RELATIVE MONOCYTE (OHS): 2.8 % (ref 4–15)
RELATIVE NEUTROPHIL (OHS): 88.8 % (ref 38–73)
SODIUM SERPL-SCNC: 142 MMOL/L (ref 136–145)
WBC # BLD AUTO: 5.38 K/UL (ref 3.9–12.7)

## 2025-05-27 PROCEDURE — 97535 SELF CARE MNGMENT TRAINING: CPT

## 2025-05-27 PROCEDURE — 25000242 PHARM REV CODE 250 ALT 637 W/ HCPCS: Performed by: STUDENT IN AN ORGANIZED HEALTH CARE EDUCATION/TRAINING PROGRAM

## 2025-05-27 PROCEDURE — 94640 AIRWAY INHALATION TREATMENT: CPT

## 2025-05-27 PROCEDURE — 93005 ELECTROCARDIOGRAM TRACING: CPT

## 2025-05-27 PROCEDURE — 27000207 HC ISOLATION

## 2025-05-27 PROCEDURE — 63600175 PHARM REV CODE 636 W HCPCS: Performed by: STUDENT IN AN ORGANIZED HEALTH CARE EDUCATION/TRAINING PROGRAM

## 2025-05-27 PROCEDURE — 99900035 HC TECH TIME PER 15 MIN (STAT)

## 2025-05-27 PROCEDURE — 94799 UNLISTED PULMONARY SVC/PX: CPT

## 2025-05-27 PROCEDURE — 97112 NEUROMUSCULAR REEDUCATION: CPT

## 2025-05-27 PROCEDURE — 97110 THERAPEUTIC EXERCISES: CPT | Mod: CQ

## 2025-05-27 PROCEDURE — 97530 THERAPEUTIC ACTIVITIES: CPT | Mod: CQ

## 2025-05-27 PROCEDURE — 25000003 PHARM REV CODE 250

## 2025-05-27 PROCEDURE — 93010 ELECTROCARDIOGRAM REPORT: CPT | Mod: ,,, | Performed by: INTERNAL MEDICINE

## 2025-05-27 PROCEDURE — 87070 CULTURE OTHR SPECIMN AEROBIC: CPT | Performed by: STUDENT IN AN ORGANIZED HEALTH CARE EDUCATION/TRAINING PROGRAM

## 2025-05-27 PROCEDURE — 80053 COMPREHEN METABOLIC PANEL: CPT | Performed by: STUDENT IN AN ORGANIZED HEALTH CARE EDUCATION/TRAINING PROGRAM

## 2025-05-27 PROCEDURE — 27000190 HC CPAP FULL FACE MASK W/VALVE

## 2025-05-27 PROCEDURE — 94761 N-INVAS EAR/PLS OXIMETRY MLT: CPT

## 2025-05-27 PROCEDURE — 94668 MNPJ CHEST WALL SBSQ: CPT

## 2025-05-27 PROCEDURE — 36415 COLL VENOUS BLD VENIPUNCTURE: CPT | Performed by: STUDENT IN AN ORGANIZED HEALTH CARE EDUCATION/TRAINING PROGRAM

## 2025-05-27 PROCEDURE — 20600001 HC STEP DOWN PRIVATE ROOM

## 2025-05-27 PROCEDURE — 27100171 HC OXYGEN HIGH FLOW UP TO 24 HOURS

## 2025-05-27 PROCEDURE — 94660 CPAP INITIATION&MGMT: CPT

## 2025-05-27 PROCEDURE — 85025 COMPLETE CBC W/AUTO DIFF WBC: CPT | Performed by: STUDENT IN AN ORGANIZED HEALTH CARE EDUCATION/TRAINING PROGRAM

## 2025-05-27 PROCEDURE — 99232 SBSQ HOSP IP/OBS MODERATE 35: CPT | Mod: GC,,, | Performed by: INTERNAL MEDICINE

## 2025-05-27 PROCEDURE — 84100 ASSAY OF PHOSPHORUS: CPT | Performed by: STUDENT IN AN ORGANIZED HEALTH CARE EDUCATION/TRAINING PROGRAM

## 2025-05-27 PROCEDURE — 25000003 PHARM REV CODE 250: Performed by: STUDENT IN AN ORGANIZED HEALTH CARE EDUCATION/TRAINING PROGRAM

## 2025-05-27 PROCEDURE — 94664 DEMO&/EVAL PT USE INHALER: CPT

## 2025-05-27 PROCEDURE — 83735 ASSAY OF MAGNESIUM: CPT | Performed by: STUDENT IN AN ORGANIZED HEALTH CARE EDUCATION/TRAINING PROGRAM

## 2025-05-27 RX ORDER — IPRATROPIUM BROMIDE 0.5 MG/2.5ML
0.5 SOLUTION RESPIRATORY (INHALATION) EVERY 4 HOURS
Status: DISCONTINUED | OUTPATIENT
Start: 2025-05-27 | End: 2025-06-03 | Stop reason: HOSPADM

## 2025-05-27 RX ORDER — FUROSEMIDE 40 MG/1
40 TABLET ORAL DAILY
Status: DISCONTINUED | OUTPATIENT
Start: 2025-05-27 | End: 2025-06-03 | Stop reason: HOSPADM

## 2025-05-27 RX ORDER — MAGNESIUM SULFATE HEPTAHYDRATE 40 MG/ML
2 INJECTION, SOLUTION INTRAVENOUS ONCE
Status: COMPLETED | OUTPATIENT
Start: 2025-05-27 | End: 2025-05-27

## 2025-05-27 RX ORDER — LEVALBUTEROL 1.25 MG/.5ML
1.25 SOLUTION, CONCENTRATE RESPIRATORY (INHALATION) EVERY 4 HOURS
Status: DISCONTINUED | OUTPATIENT
Start: 2025-05-27 | End: 2025-06-03 | Stop reason: HOSPADM

## 2025-05-27 RX ORDER — LEVALBUTEROL 1.25 MG/.5ML
1.25 SOLUTION, CONCENTRATE RESPIRATORY (INHALATION) EVERY 4 HOURS
Status: DISCONTINUED | OUTPATIENT
Start: 2025-05-27 | End: 2025-05-27

## 2025-05-27 RX ORDER — FUROSEMIDE 10 MG/ML
40 INJECTION INTRAMUSCULAR; INTRAVENOUS ONCE
Status: DISCONTINUED | OUTPATIENT
Start: 2025-05-27 | End: 2025-05-27

## 2025-05-27 RX ADMIN — IPRATROPIUM BROMIDE 0.5 MG: 0.5 SOLUTION RESPIRATORY (INHALATION) at 07:05

## 2025-05-27 RX ADMIN — TIOTROPIUM BROMIDE INHALATION SPRAY 2 PUFF: 3.12 SPRAY, METERED RESPIRATORY (INHALATION) at 07:05

## 2025-05-27 RX ADMIN — DOXYCYCLINE HYCLATE 100 MG: 100 TABLET, FILM COATED ORAL at 09:05

## 2025-05-27 RX ADMIN — ROFLUMILAST 500 MCG: 500 TABLET ORAL at 06:05

## 2025-05-27 RX ADMIN — AMITRIPTYLINE HYDROCHLORIDE 25 MG: 25 TABLET, FILM COATED ORAL at 09:05

## 2025-05-27 RX ADMIN — METHYLPREDNISOLONE SODIUM SUCCINATE 60 MG: 40 INJECTION, POWDER, FOR SOLUTION INTRAMUSCULAR; INTRAVENOUS at 01:05

## 2025-05-27 RX ADMIN — LEVETIRACETAM 500 MG: 500 TABLET, FILM COATED ORAL at 09:05

## 2025-05-27 RX ADMIN — ENOXAPARIN SODIUM 40 MG: 40 INJECTION SUBCUTANEOUS at 04:05

## 2025-05-27 RX ADMIN — IPRATROPIUM BROMIDE 0.5 MG: 0.5 SOLUTION RESPIRATORY (INHALATION) at 10:05

## 2025-05-27 RX ADMIN — LEVALBUTEROL 1.25 MG: 1.25 SOLUTION, CONCENTRATE RESPIRATORY (INHALATION) at 10:05

## 2025-05-27 RX ADMIN — ASPIRIN 81 MG: 81 TABLET, COATED ORAL at 06:05

## 2025-05-27 RX ADMIN — GUAIFENESIN 1200 MG: 600 TABLET, EXTENDED RELEASE ORAL at 09:05

## 2025-05-27 RX ADMIN — DILTIAZEM HYDROCHLORIDE 120 MG: 30 TABLET, FILM COATED ORAL at 09:05

## 2025-05-27 RX ADMIN — METHYLPREDNISOLONE SODIUM SUCCINATE 60 MG: 40 INJECTION, POWDER, FOR SOLUTION INTRAMUSCULAR; INTRAVENOUS at 11:05

## 2025-05-27 RX ADMIN — AMOXICILLIN AND CLAVULANATE POTASSIUM 1 TABLET: 875; 125 TABLET, FILM COATED ORAL at 09:05

## 2025-05-27 RX ADMIN — FUROSEMIDE 40 MG: 40 TABLET ORAL at 09:05

## 2025-05-27 RX ADMIN — ATORVASTATIN CALCIUM 80 MG: 40 TABLET, FILM COATED ORAL at 09:05

## 2025-05-27 RX ADMIN — LEVALBUTEROL 1.25 MG: 1.25 SOLUTION, CONCENTRATE RESPIRATORY (INHALATION) at 07:05

## 2025-05-27 RX ADMIN — PANTOPRAZOLE SODIUM 40 MG: 40 TABLET, DELAYED RELEASE ORAL at 09:05

## 2025-05-27 RX ADMIN — DILTIAZEM HYDROCHLORIDE 120 MG: 30 TABLET, FILM COATED ORAL at 01:05

## 2025-05-27 RX ADMIN — MAGNESIUM SULFATE HEPTAHYDRATE 2 G: 40 INJECTION, SOLUTION INTRAVENOUS at 12:05

## 2025-05-27 RX ADMIN — IPRATROPIUM BROMIDE 0.5 MG: 0.5 SOLUTION RESPIRATORY (INHALATION) at 02:05

## 2025-05-27 RX ADMIN — SODIUM CHLORIDE SOLN NEBU 3% 4 ML: 3 NEBU SOLN at 07:05

## 2025-05-27 RX ADMIN — LEVALBUTEROL 1.25 MG: 1.25 SOLUTION, CONCENTRATE RESPIRATORY (INHALATION) at 04:05

## 2025-05-27 RX ADMIN — LEVALBUTEROL 1.25 MG: 1.25 SOLUTION, CONCENTRATE RESPIRATORY (INHALATION) at 02:05

## 2025-05-27 RX ADMIN — DILTIAZEM HYDROCHLORIDE 120 MG: 30 TABLET, FILM COATED ORAL at 06:05

## 2025-05-27 RX ADMIN — IPRATROPIUM BROMIDE 0.5 MG: 0.5 SOLUTION RESPIRATORY (INHALATION) at 04:05

## 2025-05-28 LAB
ABSOLUTE EOSINOPHIL (OHS): 0 K/UL
ABSOLUTE MONOCYTE (OHS): 0.23 K/UL (ref 0.3–1)
ABSOLUTE NEUTROPHIL COUNT (OHS): 6.54 K/UL (ref 1.8–7.7)
ALBUMIN SERPL BCP-MCNC: 2.5 G/DL (ref 3.5–5.2)
ALP SERPL-CCNC: 101 UNIT/L (ref 40–150)
ALT SERPL W/O P-5'-P-CCNC: 51 UNIT/L (ref 10–44)
ANION GAP (OHS): 13 MMOL/L (ref 8–16)
AST SERPL-CCNC: 29 UNIT/L (ref 11–45)
BACTERIA SPEC CULT: ABNORMAL
BACTERIA SPEC CULT: ABNORMAL
BASOPHILS # BLD AUTO: 0.03 K/UL
BASOPHILS NFR BLD AUTO: 0.4 %
BILIRUB SERPL-MCNC: 0.4 MG/DL (ref 0.1–1)
BUN SERPL-MCNC: 39 MG/DL (ref 8–23)
CALCIUM SERPL-MCNC: 9 MG/DL (ref 8.7–10.5)
CHLORIDE SERPL-SCNC: 102 MMOL/L (ref 95–110)
CO2 SERPL-SCNC: 27 MMOL/L (ref 23–29)
CREAT SERPL-MCNC: 1.1 MG/DL (ref 0.5–1.4)
ERYTHROCYTE [DISTWIDTH] IN BLOOD BY AUTOMATED COUNT: 16.1 % (ref 11.5–14.5)
GFR SERPLBLD CREATININE-BSD FMLA CKD-EPI: >60 ML/MIN/1.73/M2
GLUCOSE SERPL-MCNC: 158 MG/DL (ref 70–110)
GRAM STN SPEC: ABNORMAL
HCT VFR BLD AUTO: 39.4 % (ref 40–54)
HGB BLD-MCNC: 12.6 GM/DL (ref 14–18)
IMM GRANULOCYTES # BLD AUTO: 0.27 K/UL (ref 0–0.04)
IMM GRANULOCYTES NFR BLD AUTO: 3.7 % (ref 0–0.5)
LYMPHOCYTES # BLD AUTO: 0.32 K/UL (ref 1–4.8)
MAGNESIUM SERPL-MCNC: 2.2 MG/DL (ref 1.6–2.6)
MCH RBC QN AUTO: 28.1 PG (ref 27–31)
MCHC RBC AUTO-ENTMCNC: 32 G/DL (ref 32–36)
MCV RBC AUTO: 88 FL (ref 82–98)
NUCLEATED RBC (/100WBC) (OHS): 0 /100 WBC
PHOSPHATE SERPL-MCNC: 3.1 MG/DL (ref 2.7–4.5)
PLATELET # BLD AUTO: 263 K/UL (ref 150–450)
PMV BLD AUTO: 10 FL (ref 9.2–12.9)
POTASSIUM SERPL-SCNC: 3.8 MMOL/L (ref 3.5–5.1)
PROT SERPL-MCNC: 6 GM/DL (ref 6–8.4)
RBC # BLD AUTO: 4.48 M/UL (ref 4.6–6.2)
RELATIVE EOSINOPHIL (OHS): 0 %
RELATIVE LYMPHOCYTE (OHS): 4.3 % (ref 18–48)
RELATIVE MONOCYTE (OHS): 3.1 % (ref 4–15)
RELATIVE NEUTROPHIL (OHS): 88.5 % (ref 38–73)
SODIUM SERPL-SCNC: 142 MMOL/L (ref 136–145)
WBC # BLD AUTO: 7.39 K/UL (ref 3.9–12.7)

## 2025-05-28 PROCEDURE — 94799 UNLISTED PULMONARY SVC/PX: CPT

## 2025-05-28 PROCEDURE — 83735 ASSAY OF MAGNESIUM: CPT | Performed by: STUDENT IN AN ORGANIZED HEALTH CARE EDUCATION/TRAINING PROGRAM

## 2025-05-28 PROCEDURE — 25000003 PHARM REV CODE 250

## 2025-05-28 PROCEDURE — 25000242 PHARM REV CODE 250 ALT 637 W/ HCPCS: Performed by: STUDENT IN AN ORGANIZED HEALTH CARE EDUCATION/TRAINING PROGRAM

## 2025-05-28 PROCEDURE — 94761 N-INVAS EAR/PLS OXIMETRY MLT: CPT

## 2025-05-28 PROCEDURE — 80053 COMPREHEN METABOLIC PANEL: CPT | Performed by: STUDENT IN AN ORGANIZED HEALTH CARE EDUCATION/TRAINING PROGRAM

## 2025-05-28 PROCEDURE — 94640 AIRWAY INHALATION TREATMENT: CPT

## 2025-05-28 PROCEDURE — 36415 COLL VENOUS BLD VENIPUNCTURE: CPT | Performed by: STUDENT IN AN ORGANIZED HEALTH CARE EDUCATION/TRAINING PROGRAM

## 2025-05-28 PROCEDURE — 20600001 HC STEP DOWN PRIVATE ROOM

## 2025-05-28 PROCEDURE — 27000207 HC ISOLATION

## 2025-05-28 PROCEDURE — 99900035 HC TECH TIME PER 15 MIN (STAT)

## 2025-05-28 PROCEDURE — 63600175 PHARM REV CODE 636 W HCPCS: Performed by: STUDENT IN AN ORGANIZED HEALTH CARE EDUCATION/TRAINING PROGRAM

## 2025-05-28 PROCEDURE — 94660 CPAP INITIATION&MGMT: CPT

## 2025-05-28 PROCEDURE — 94668 MNPJ CHEST WALL SBSQ: CPT

## 2025-05-28 PROCEDURE — 25000003 PHARM REV CODE 250: Performed by: STUDENT IN AN ORGANIZED HEALTH CARE EDUCATION/TRAINING PROGRAM

## 2025-05-28 PROCEDURE — 85025 COMPLETE CBC W/AUTO DIFF WBC: CPT | Performed by: STUDENT IN AN ORGANIZED HEALTH CARE EDUCATION/TRAINING PROGRAM

## 2025-05-28 PROCEDURE — 27100171 HC OXYGEN HIGH FLOW UP TO 24 HOURS

## 2025-05-28 PROCEDURE — 84100 ASSAY OF PHOSPHORUS: CPT | Performed by: STUDENT IN AN ORGANIZED HEALTH CARE EDUCATION/TRAINING PROGRAM

## 2025-05-28 RX ADMIN — AMOXICILLIN AND CLAVULANATE POTASSIUM 1 TABLET: 875; 125 TABLET, FILM COATED ORAL at 10:05

## 2025-05-28 RX ADMIN — ASPIRIN 81 MG: 81 TABLET, COATED ORAL at 09:05

## 2025-05-28 RX ADMIN — DILTIAZEM HYDROCHLORIDE 120 MG: 30 TABLET, FILM COATED ORAL at 05:05

## 2025-05-28 RX ADMIN — LEVALBUTEROL 1.25 MG: 1.25 SOLUTION, CONCENTRATE RESPIRATORY (INHALATION) at 12:05

## 2025-05-28 RX ADMIN — LEVALBUTEROL 1.25 MG: 1.25 SOLUTION, CONCENTRATE RESPIRATORY (INHALATION) at 04:05

## 2025-05-28 RX ADMIN — LEVALBUTEROL 1.25 MG: 1.25 SOLUTION, CONCENTRATE RESPIRATORY (INHALATION) at 08:05

## 2025-05-28 RX ADMIN — TIOTROPIUM BROMIDE INHALATION SPRAY 2 PUFF: 3.12 SPRAY, METERED RESPIRATORY (INHALATION) at 08:05

## 2025-05-28 RX ADMIN — ATORVASTATIN CALCIUM 80 MG: 40 TABLET, FILM COATED ORAL at 09:05

## 2025-05-28 RX ADMIN — IPRATROPIUM BROMIDE 0.5 MG: 0.5 SOLUTION RESPIRATORY (INHALATION) at 12:05

## 2025-05-28 RX ADMIN — DILTIAZEM HYDROCHLORIDE 120 MG: 30 TABLET, FILM COATED ORAL at 10:05

## 2025-05-28 RX ADMIN — LEVETIRACETAM 500 MG: 500 TABLET, FILM COATED ORAL at 10:05

## 2025-05-28 RX ADMIN — IPRATROPIUM BROMIDE 0.5 MG: 0.5 SOLUTION RESPIRATORY (INHALATION) at 01:05

## 2025-05-28 RX ADMIN — LEVETIRACETAM 500 MG: 500 TABLET, FILM COATED ORAL at 09:05

## 2025-05-28 RX ADMIN — FUROSEMIDE 40 MG: 40 TABLET ORAL at 09:05

## 2025-05-28 RX ADMIN — DOXYCYCLINE HYCLATE 100 MG: 100 TABLET, FILM COATED ORAL at 09:05

## 2025-05-28 RX ADMIN — PANTOPRAZOLE SODIUM 40 MG: 40 TABLET, DELAYED RELEASE ORAL at 09:05

## 2025-05-28 RX ADMIN — DOXYCYCLINE HYCLATE 100 MG: 100 TABLET, FILM COATED ORAL at 10:05

## 2025-05-28 RX ADMIN — GUAIFENESIN 1200 MG: 600 TABLET, EXTENDED RELEASE ORAL at 10:05

## 2025-05-28 RX ADMIN — METHYLPREDNISOLONE SODIUM SUCCINATE 60 MG: 40 INJECTION, POWDER, FOR SOLUTION INTRAMUSCULAR; INTRAVENOUS at 01:05

## 2025-05-28 RX ADMIN — METHYLPREDNISOLONE SODIUM SUCCINATE 60 MG: 40 INJECTION, POWDER, FOR SOLUTION INTRAMUSCULAR; INTRAVENOUS at 02:05

## 2025-05-28 RX ADMIN — LEVALBUTEROL 1.25 MG: 1.25 SOLUTION, CONCENTRATE RESPIRATORY (INHALATION) at 05:05

## 2025-05-28 RX ADMIN — IPRATROPIUM BROMIDE 0.5 MG: 0.5 SOLUTION RESPIRATORY (INHALATION) at 08:05

## 2025-05-28 RX ADMIN — AMITRIPTYLINE HYDROCHLORIDE 25 MG: 25 TABLET, FILM COATED ORAL at 10:05

## 2025-05-28 RX ADMIN — IPRATROPIUM BROMIDE 0.5 MG: 0.5 SOLUTION RESPIRATORY (INHALATION) at 04:05

## 2025-05-28 RX ADMIN — DILTIAZEM HYDROCHLORIDE 120 MG: 30 TABLET, FILM COATED ORAL at 02:05

## 2025-05-28 RX ADMIN — SODIUM CHLORIDE SOLN NEBU 3% 4 ML: 3 NEBU SOLN at 08:05

## 2025-05-28 RX ADMIN — GUAIFENESIN 1200 MG: 600 TABLET, EXTENDED RELEASE ORAL at 09:05

## 2025-05-28 RX ADMIN — AMOXICILLIN AND CLAVULANATE POTASSIUM 1 TABLET: 875; 125 TABLET, FILM COATED ORAL at 09:05

## 2025-05-28 RX ADMIN — ENOXAPARIN SODIUM 40 MG: 40 INJECTION SUBCUTANEOUS at 05:05

## 2025-05-28 RX ADMIN — LEVALBUTEROL 1.25 MG: 1.25 SOLUTION, CONCENTRATE RESPIRATORY (INHALATION) at 01:05

## 2025-05-28 RX ADMIN — IPRATROPIUM BROMIDE 0.5 MG: 0.5 SOLUTION RESPIRATORY (INHALATION) at 05:05

## 2025-05-29 ENCOUNTER — TELEPHONE (OUTPATIENT)
Dept: HEMATOLOGY/ONCOLOGY | Facility: CLINIC | Age: 80
End: 2025-05-29
Payer: MEDICARE

## 2025-05-29 LAB
ABSOLUTE NEUTROPHIL MANUAL (OHS): 9.3 K/UL
ALBUMIN SERPL BCP-MCNC: 2.6 G/DL (ref 3.5–5.2)
ALP SERPL-CCNC: 104 UNIT/L (ref 40–150)
ALT SERPL W/O P-5'-P-CCNC: 74 UNIT/L (ref 10–44)
ANION GAP (OHS): 12 MMOL/L (ref 8–16)
AST SERPL-CCNC: 42 UNIT/L (ref 11–45)
BACTERIA BLD CULT: NORMAL
BACTERIA BLD CULT: NORMAL
BILIRUB SERPL-MCNC: 0.3 MG/DL (ref 0.1–1)
BUN SERPL-MCNC: 48 MG/DL (ref 8–23)
CALCIUM SERPL-MCNC: 9.2 MG/DL (ref 8.7–10.5)
CHLORIDE SERPL-SCNC: 103 MMOL/L (ref 95–110)
CO2 SERPL-SCNC: 30 MMOL/L (ref 23–29)
CREAT SERPL-MCNC: 1.1 MG/DL (ref 0.5–1.4)
ERYTHROCYTE [DISTWIDTH] IN BLOOD BY AUTOMATED COUNT: 16 % (ref 11.5–14.5)
GFR SERPLBLD CREATININE-BSD FMLA CKD-EPI: >60 ML/MIN/1.73/M2
GLUCOSE SERPL-MCNC: 174 MG/DL (ref 70–110)
HCT VFR BLD AUTO: 38.4 % (ref 40–54)
HGB BLD-MCNC: 12.2 GM/DL (ref 14–18)
LYMPHOCYTES NFR BLD MANUAL: 2 % (ref 18–48)
MAGNESIUM SERPL-MCNC: 1.9 MG/DL (ref 1.6–2.6)
MCH RBC QN AUTO: 28.2 PG (ref 27–31)
MCHC RBC AUTO-ENTMCNC: 31.8 G/DL (ref 32–36)
MCV RBC AUTO: 89 FL (ref 82–98)
METAMYELOCYTES NFR BLD MANUAL: 1 %
MONOCYTES NFR BLD MANUAL: 8 % (ref 4–15)
MYELOCYTES NFR BLD MANUAL: 1 %
NEUTROPHILS NFR BLD MANUAL: 88 % (ref 38–73)
NUCLEATED RBC (/100WBC) (OHS): 0 /100 WBC
PHOSPHATE SERPL-MCNC: 2.6 MG/DL (ref 2.7–4.5)
PLATELET # BLD AUTO: 299 K/UL (ref 150–450)
PLATELET BLD QL SMEAR: ABNORMAL
PMV BLD AUTO: 9.8 FL (ref 9.2–12.9)
POTASSIUM SERPL-SCNC: 3.9 MMOL/L (ref 3.5–5.1)
PROT SERPL-MCNC: 5.6 GM/DL (ref 6–8.4)
RBC # BLD AUTO: 4.32 M/UL (ref 4.6–6.2)
SODIUM SERPL-SCNC: 145 MMOL/L (ref 136–145)
WBC # BLD AUTO: 10.6 K/UL (ref 3.9–12.7)

## 2025-05-29 PROCEDURE — 36415 COLL VENOUS BLD VENIPUNCTURE: CPT | Performed by: STUDENT IN AN ORGANIZED HEALTH CARE EDUCATION/TRAINING PROGRAM

## 2025-05-29 PROCEDURE — 25000003 PHARM REV CODE 250: Performed by: STUDENT IN AN ORGANIZED HEALTH CARE EDUCATION/TRAINING PROGRAM

## 2025-05-29 PROCEDURE — 80053 COMPREHEN METABOLIC PANEL: CPT | Performed by: STUDENT IN AN ORGANIZED HEALTH CARE EDUCATION/TRAINING PROGRAM

## 2025-05-29 PROCEDURE — 27100171 HC OXYGEN HIGH FLOW UP TO 24 HOURS

## 2025-05-29 PROCEDURE — 94761 N-INVAS EAR/PLS OXIMETRY MLT: CPT

## 2025-05-29 PROCEDURE — 27000207 HC ISOLATION

## 2025-05-29 PROCEDURE — 20600001 HC STEP DOWN PRIVATE ROOM

## 2025-05-29 PROCEDURE — 97530 THERAPEUTIC ACTIVITIES: CPT | Mod: CQ

## 2025-05-29 PROCEDURE — 99900035 HC TECH TIME PER 15 MIN (STAT)

## 2025-05-29 PROCEDURE — 83735 ASSAY OF MAGNESIUM: CPT | Performed by: STUDENT IN AN ORGANIZED HEALTH CARE EDUCATION/TRAINING PROGRAM

## 2025-05-29 PROCEDURE — 63600175 PHARM REV CODE 636 W HCPCS: Performed by: STUDENT IN AN ORGANIZED HEALTH CARE EDUCATION/TRAINING PROGRAM

## 2025-05-29 PROCEDURE — 25000242 PHARM REV CODE 250 ALT 637 W/ HCPCS: Performed by: STUDENT IN AN ORGANIZED HEALTH CARE EDUCATION/TRAINING PROGRAM

## 2025-05-29 PROCEDURE — 85007 BL SMEAR W/DIFF WBC COUNT: CPT | Performed by: STUDENT IN AN ORGANIZED HEALTH CARE EDUCATION/TRAINING PROGRAM

## 2025-05-29 PROCEDURE — 94799 UNLISTED PULMONARY SVC/PX: CPT

## 2025-05-29 PROCEDURE — 25000003 PHARM REV CODE 250

## 2025-05-29 PROCEDURE — 94640 AIRWAY INHALATION TREATMENT: CPT

## 2025-05-29 PROCEDURE — 84100 ASSAY OF PHOSPHORUS: CPT | Performed by: STUDENT IN AN ORGANIZED HEALTH CARE EDUCATION/TRAINING PROGRAM

## 2025-05-29 PROCEDURE — 99232 SBSQ HOSP IP/OBS MODERATE 35: CPT | Mod: ,,, | Performed by: INTERNAL MEDICINE

## 2025-05-29 PROCEDURE — 94660 CPAP INITIATION&MGMT: CPT

## 2025-05-29 RX ADMIN — ENOXAPARIN SODIUM 40 MG: 40 INJECTION SUBCUTANEOUS at 05:05

## 2025-05-29 RX ADMIN — LEVALBUTEROL 1.25 MG: 1.25 SOLUTION, CONCENTRATE RESPIRATORY (INHALATION) at 12:05

## 2025-05-29 RX ADMIN — ASPIRIN 81 MG: 81 TABLET, COATED ORAL at 06:05

## 2025-05-29 RX ADMIN — ATORVASTATIN CALCIUM 80 MG: 40 TABLET, FILM COATED ORAL at 08:05

## 2025-05-29 RX ADMIN — SODIUM CHLORIDE SOLN NEBU 3% 4 ML: 3 NEBU SOLN at 09:05

## 2025-05-29 RX ADMIN — GUAIFENESIN 1200 MG: 600 TABLET, EXTENDED RELEASE ORAL at 08:05

## 2025-05-29 RX ADMIN — TIOTROPIUM BROMIDE INHALATION SPRAY 2 PUFF: 3.12 SPRAY, METERED RESPIRATORY (INHALATION) at 08:05

## 2025-05-29 RX ADMIN — PREDNISONE 60 MG: 50 TABLET ORAL at 08:05

## 2025-05-29 RX ADMIN — LEVALBUTEROL 1.25 MG: 1.25 SOLUTION, CONCENTRATE RESPIRATORY (INHALATION) at 03:05

## 2025-05-29 RX ADMIN — IPRATROPIUM BROMIDE 0.5 MG: 0.5 SOLUTION RESPIRATORY (INHALATION) at 12:05

## 2025-05-29 RX ADMIN — AMITRIPTYLINE HYDROCHLORIDE 25 MG: 25 TABLET, FILM COATED ORAL at 08:05

## 2025-05-29 RX ADMIN — DILTIAZEM HYDROCHLORIDE 120 MG: 30 TABLET, FILM COATED ORAL at 08:05

## 2025-05-29 RX ADMIN — IPRATROPIUM BROMIDE 0.5 MG: 0.5 SOLUTION RESPIRATORY (INHALATION) at 04:05

## 2025-05-29 RX ADMIN — ROFLUMILAST 500 MCG: 500 TABLET ORAL at 08:05

## 2025-05-29 RX ADMIN — IPRATROPIUM BROMIDE 0.5 MG: 0.5 SOLUTION RESPIRATORY (INHALATION) at 08:05

## 2025-05-29 RX ADMIN — SODIUM CHLORIDE SOLN NEBU 3% 4 ML: 3 NEBU SOLN at 08:05

## 2025-05-29 RX ADMIN — DILTIAZEM HYDROCHLORIDE 120 MG: 30 TABLET, FILM COATED ORAL at 06:05

## 2025-05-29 RX ADMIN — LEVALBUTEROL 1.25 MG: 1.25 SOLUTION, CONCENTRATE RESPIRATORY (INHALATION) at 09:05

## 2025-05-29 RX ADMIN — IPRATROPIUM BROMIDE 0.5 MG: 0.5 SOLUTION RESPIRATORY (INHALATION) at 09:05

## 2025-05-29 RX ADMIN — DOXYCYCLINE HYCLATE 100 MG: 100 TABLET, FILM COATED ORAL at 08:05

## 2025-05-29 RX ADMIN — PANTOPRAZOLE SODIUM 40 MG: 40 TABLET, DELAYED RELEASE ORAL at 08:05

## 2025-05-29 RX ADMIN — IPRATROPIUM BROMIDE 0.5 MG: 0.5 SOLUTION RESPIRATORY (INHALATION) at 03:05

## 2025-05-29 RX ADMIN — DILTIAZEM HYDROCHLORIDE 120 MG: 30 TABLET, FILM COATED ORAL at 02:05

## 2025-05-29 RX ADMIN — LEVALBUTEROL 1.25 MG: 1.25 SOLUTION, CONCENTRATE RESPIRATORY (INHALATION) at 08:05

## 2025-05-29 RX ADMIN — LEVALBUTEROL 1.25 MG: 1.25 SOLUTION, CONCENTRATE RESPIRATORY (INHALATION) at 04:05

## 2025-05-29 RX ADMIN — LEVETIRACETAM 500 MG: 500 TABLET, FILM COATED ORAL at 08:05

## 2025-05-29 RX ADMIN — FUROSEMIDE 40 MG: 40 TABLET ORAL at 08:05

## 2025-05-29 RX ADMIN — AMOXICILLIN AND CLAVULANATE POTASSIUM 1 TABLET: 875; 125 TABLET, FILM COATED ORAL at 08:05

## 2025-05-29 NOTE — TELEPHONE ENCOUNTER
Spoke with patient regarding 5/30/25 appt. Patient stated he is in the hospital and requested I call Marilou(daughter) to discuss rescheduling pending discharge. Marilou stated she is not sure when patient will be discharged and will call to reschedule after.

## 2025-05-30 LAB
ANION GAP (OHS): 10 MMOL/L (ref 8–16)
BACTERIA SPT CULT: ABNORMAL
BACTERIA SPT CULT: ABNORMAL
BUN SERPL-MCNC: 42 MG/DL (ref 8–23)
CALCIUM SERPL-MCNC: 8.8 MG/DL (ref 8.7–10.5)
CHLORIDE SERPL-SCNC: 103 MMOL/L (ref 95–110)
CO2 SERPL-SCNC: 31 MMOL/L (ref 23–29)
CREAT SERPL-MCNC: 0.8 MG/DL (ref 0.5–1.4)
GFR SERPLBLD CREATININE-BSD FMLA CKD-EPI: >60 ML/MIN/1.73/M2
GLUCOSE SERPL-MCNC: 142 MG/DL (ref 70–110)
GRAM STN SPEC: ABNORMAL
MAGNESIUM SERPL-MCNC: 1.7 MG/DL (ref 1.6–2.6)
PHOSPHATE SERPL-MCNC: 2.6 MG/DL (ref 2.7–4.5)
POTASSIUM SERPL-SCNC: 3.5 MMOL/L (ref 3.5–5.1)
SODIUM SERPL-SCNC: 144 MMOL/L (ref 136–145)

## 2025-05-30 PROCEDURE — 36415 COLL VENOUS BLD VENIPUNCTURE: CPT | Performed by: STUDENT IN AN ORGANIZED HEALTH CARE EDUCATION/TRAINING PROGRAM

## 2025-05-30 PROCEDURE — 94761 N-INVAS EAR/PLS OXIMETRY MLT: CPT

## 2025-05-30 PROCEDURE — 97535 SELF CARE MNGMENT TRAINING: CPT

## 2025-05-30 PROCEDURE — 27100171 HC OXYGEN HIGH FLOW UP TO 24 HOURS

## 2025-05-30 PROCEDURE — 94640 AIRWAY INHALATION TREATMENT: CPT

## 2025-05-30 PROCEDURE — 63600175 PHARM REV CODE 636 W HCPCS: Performed by: STUDENT IN AN ORGANIZED HEALTH CARE EDUCATION/TRAINING PROGRAM

## 2025-05-30 PROCEDURE — 80048 BASIC METABOLIC PNL TOTAL CA: CPT

## 2025-05-30 PROCEDURE — 20600001 HC STEP DOWN PRIVATE ROOM

## 2025-05-30 PROCEDURE — 25000242 PHARM REV CODE 250 ALT 637 W/ HCPCS: Performed by: STUDENT IN AN ORGANIZED HEALTH CARE EDUCATION/TRAINING PROGRAM

## 2025-05-30 PROCEDURE — 25000003 PHARM REV CODE 250

## 2025-05-30 PROCEDURE — 99900035 HC TECH TIME PER 15 MIN (STAT)

## 2025-05-30 PROCEDURE — 94799 UNLISTED PULMONARY SVC/PX: CPT

## 2025-05-30 PROCEDURE — 25000003 PHARM REV CODE 250: Performed by: STUDENT IN AN ORGANIZED HEALTH CARE EDUCATION/TRAINING PROGRAM

## 2025-05-30 PROCEDURE — 94660 CPAP INITIATION&MGMT: CPT

## 2025-05-30 PROCEDURE — 27000207 HC ISOLATION

## 2025-05-30 PROCEDURE — 97530 THERAPEUTIC ACTIVITIES: CPT

## 2025-05-30 PROCEDURE — 84100 ASSAY OF PHOSPHORUS: CPT | Performed by: STUDENT IN AN ORGANIZED HEALTH CARE EDUCATION/TRAINING PROGRAM

## 2025-05-30 PROCEDURE — 83735 ASSAY OF MAGNESIUM: CPT | Performed by: STUDENT IN AN ORGANIZED HEALTH CARE EDUCATION/TRAINING PROGRAM

## 2025-05-30 RX ORDER — DILTIAZEM HYDROCHLORIDE 30 MG/1
120 TABLET, FILM COATED ORAL EVERY 8 HOURS
Status: DISCONTINUED | OUTPATIENT
Start: 2025-05-31 | End: 2025-06-03 | Stop reason: HOSPADM

## 2025-05-30 RX ORDER — PREDNISONE 20 MG/1
40 TABLET ORAL DAILY
Status: DISCONTINUED | OUTPATIENT
Start: 2025-05-31 | End: 2025-06-02

## 2025-05-30 RX ADMIN — AMITRIPTYLINE HYDROCHLORIDE 25 MG: 25 TABLET, FILM COATED ORAL at 08:05

## 2025-05-30 RX ADMIN — IPRATROPIUM BROMIDE 0.5 MG: 0.5 SOLUTION RESPIRATORY (INHALATION) at 08:05

## 2025-05-30 RX ADMIN — LEVALBUTEROL 1.25 MG: 1.25 SOLUTION, CONCENTRATE RESPIRATORY (INHALATION) at 04:05

## 2025-05-30 RX ADMIN — SODIUM CHLORIDE SOLN NEBU 3% 4 ML: 3 NEBU SOLN at 09:05

## 2025-05-30 RX ADMIN — SODIUM CHLORIDE SOLN NEBU 3% 4 ML: 3 NEBU SOLN at 08:05

## 2025-05-30 RX ADMIN — IPRATROPIUM BROMIDE 0.5 MG: 0.5 SOLUTION RESPIRATORY (INHALATION) at 04:05

## 2025-05-30 RX ADMIN — LEVALBUTEROL 1.25 MG: 1.25 SOLUTION, CONCENTRATE RESPIRATORY (INHALATION) at 12:05

## 2025-05-30 RX ADMIN — AMOXICILLIN AND CLAVULANATE POTASSIUM 1 TABLET: 875; 125 TABLET, FILM COATED ORAL at 08:05

## 2025-05-30 RX ADMIN — TIOTROPIUM BROMIDE INHALATION SPRAY 2 PUFF: 3.12 SPRAY, METERED RESPIRATORY (INHALATION) at 11:05

## 2025-05-30 RX ADMIN — LEVALBUTEROL 1.25 MG: 1.25 SOLUTION, CONCENTRATE RESPIRATORY (INHALATION) at 11:05

## 2025-05-30 RX ADMIN — ASPIRIN 81 MG: 81 TABLET, COATED ORAL at 08:05

## 2025-05-30 RX ADMIN — GUAIFENESIN 1200 MG: 600 TABLET, EXTENDED RELEASE ORAL at 08:05

## 2025-05-30 RX ADMIN — DOXYCYCLINE HYCLATE 100 MG: 100 TABLET, FILM COATED ORAL at 08:05

## 2025-05-30 RX ADMIN — LEVETIRACETAM 500 MG: 500 TABLET, FILM COATED ORAL at 08:05

## 2025-05-30 RX ADMIN — ATORVASTATIN CALCIUM 80 MG: 40 TABLET, FILM COATED ORAL at 08:05

## 2025-05-30 RX ADMIN — ROFLUMILAST 500 MCG: 500 TABLET ORAL at 09:05

## 2025-05-30 RX ADMIN — LEVALBUTEROL 1.25 MG: 1.25 SOLUTION, CONCENTRATE RESPIRATORY (INHALATION) at 08:05

## 2025-05-30 RX ADMIN — FUROSEMIDE 40 MG: 40 TABLET ORAL at 08:05

## 2025-05-30 RX ADMIN — IPRATROPIUM BROMIDE 0.5 MG: 0.5 SOLUTION RESPIRATORY (INHALATION) at 09:05

## 2025-05-30 RX ADMIN — IPRATROPIUM BROMIDE 0.5 MG: 0.5 SOLUTION RESPIRATORY (INHALATION) at 12:05

## 2025-05-30 RX ADMIN — LEVALBUTEROL 1.25 MG: 1.25 SOLUTION, CONCENTRATE RESPIRATORY (INHALATION) at 03:05

## 2025-05-30 RX ADMIN — ENOXAPARIN SODIUM 40 MG: 40 INJECTION SUBCUTANEOUS at 05:05

## 2025-05-30 RX ADMIN — PREDNISONE 60 MG: 50 TABLET ORAL at 08:05

## 2025-05-30 RX ADMIN — IPRATROPIUM BROMIDE 0.5 MG: 0.5 SOLUTION RESPIRATORY (INHALATION) at 11:05

## 2025-05-30 RX ADMIN — IPRATROPIUM BROMIDE 0.5 MG: 0.5 SOLUTION RESPIRATORY (INHALATION) at 03:05

## 2025-05-30 RX ADMIN — LEVALBUTEROL 1.25 MG: 1.25 SOLUTION, CONCENTRATE RESPIRATORY (INHALATION) at 09:05

## 2025-05-30 RX ADMIN — PANTOPRAZOLE SODIUM 40 MG: 40 TABLET, DELAYED RELEASE ORAL at 08:05

## 2025-05-30 RX ADMIN — DILTIAZEM HYDROCHLORIDE 120 MG: 30 TABLET, FILM COATED ORAL at 06:05

## 2025-05-30 RX ADMIN — DILTIAZEM HYDROCHLORIDE 120 MG: 30 TABLET, FILM COATED ORAL at 08:05

## 2025-05-31 ENCOUNTER — EXTERNAL CHRONIC CARE MANAGEMENT (OUTPATIENT)
Dept: PRIMARY CARE CLINIC | Facility: CLINIC | Age: 80
End: 2025-05-31
Payer: MEDICARE

## 2025-05-31 LAB
ABSOLUTE NEUTROPHIL MANUAL (OHS): 11.4 K/UL
ANION GAP (OHS): 13 MMOL/L (ref 8–16)
BUN SERPL-MCNC: 42 MG/DL (ref 8–23)
CALCIUM SERPL-MCNC: 9.3 MG/DL (ref 8.7–10.5)
CHLORIDE SERPL-SCNC: 103 MMOL/L (ref 95–110)
CO2 SERPL-SCNC: 27 MMOL/L (ref 23–29)
CREAT SERPL-MCNC: 0.9 MG/DL (ref 0.5–1.4)
ERYTHROCYTE [DISTWIDTH] IN BLOOD BY AUTOMATED COUNT: 15.9 % (ref 11.5–14.5)
GFR SERPLBLD CREATININE-BSD FMLA CKD-EPI: >60 ML/MIN/1.73/M2
GLUCOSE SERPL-MCNC: 168 MG/DL (ref 70–110)
HCT VFR BLD AUTO: 45.3 % (ref 40–54)
HGB BLD-MCNC: 14.6 GM/DL (ref 14–18)
LYMPHOCYTES NFR BLD MANUAL: 3 % (ref 18–48)
MAGNESIUM SERPL-MCNC: 1.7 MG/DL (ref 1.6–2.6)
MCH RBC QN AUTO: 28.7 PG (ref 27–31)
MCHC RBC AUTO-ENTMCNC: 32.2 G/DL (ref 32–36)
MCV RBC AUTO: 89 FL (ref 82–98)
METAMYELOCYTES NFR BLD MANUAL: 1 %
MONOCYTES NFR BLD MANUAL: 8 % (ref 4–15)
MYELOCYTES NFR BLD MANUAL: 2 %
NEUTROPHILS NFR BLD MANUAL: 86 % (ref 38–73)
NUCLEATED RBC (/100WBC) (OHS): 0 /100 WBC
PHOSPHATE SERPL-MCNC: 2.3 MG/DL (ref 2.7–4.5)
PLATELET # BLD AUTO: 258 K/UL (ref 150–450)
PMV BLD AUTO: 10 FL (ref 9.2–12.9)
POTASSIUM SERPL-SCNC: 3.5 MMOL/L (ref 3.5–5.1)
RBC # BLD AUTO: 5.08 M/UL (ref 4.6–6.2)
SODIUM SERPL-SCNC: 143 MMOL/L (ref 136–145)
WBC # BLD AUTO: 13.29 K/UL (ref 3.9–12.7)

## 2025-05-31 PROCEDURE — 20600001 HC STEP DOWN PRIVATE ROOM

## 2025-05-31 PROCEDURE — 25000242 PHARM REV CODE 250 ALT 637 W/ HCPCS: Performed by: STUDENT IN AN ORGANIZED HEALTH CARE EDUCATION/TRAINING PROGRAM

## 2025-05-31 PROCEDURE — 94668 MNPJ CHEST WALL SBSQ: CPT

## 2025-05-31 PROCEDURE — 99439 CHRNC CARE MGMT STAF EA ADDL: CPT | Mod: S$PBB,,, | Performed by: INTERNAL MEDICINE

## 2025-05-31 PROCEDURE — 25000003 PHARM REV CODE 250

## 2025-05-31 PROCEDURE — 94761 N-INVAS EAR/PLS OXIMETRY MLT: CPT

## 2025-05-31 PROCEDURE — 94640 AIRWAY INHALATION TREATMENT: CPT

## 2025-05-31 PROCEDURE — 63600175 PHARM REV CODE 636 W HCPCS

## 2025-05-31 PROCEDURE — 25000003 PHARM REV CODE 250: Performed by: STUDENT IN AN ORGANIZED HEALTH CARE EDUCATION/TRAINING PROGRAM

## 2025-05-31 PROCEDURE — 63600175 PHARM REV CODE 636 W HCPCS: Performed by: STUDENT IN AN ORGANIZED HEALTH CARE EDUCATION/TRAINING PROGRAM

## 2025-05-31 PROCEDURE — 84100 ASSAY OF PHOSPHORUS: CPT

## 2025-05-31 PROCEDURE — 36415 COLL VENOUS BLD VENIPUNCTURE: CPT

## 2025-05-31 PROCEDURE — 27000207 HC ISOLATION

## 2025-05-31 PROCEDURE — 85025 COMPLETE CBC W/AUTO DIFF WBC: CPT

## 2025-05-31 PROCEDURE — 94660 CPAP INITIATION&MGMT: CPT

## 2025-05-31 PROCEDURE — 99900035 HC TECH TIME PER 15 MIN (STAT)

## 2025-05-31 PROCEDURE — 27100171 HC OXYGEN HIGH FLOW UP TO 24 HOURS

## 2025-05-31 PROCEDURE — 99439 CHRNC CARE MGMT STAF EA ADDL: CPT | Mod: PBBFAC,PO | Performed by: INTERNAL MEDICINE

## 2025-05-31 PROCEDURE — 99490 CHRNC CARE MGMT STAFF 1ST 20: CPT | Mod: PBBFAC,PO | Performed by: INTERNAL MEDICINE

## 2025-05-31 PROCEDURE — 99490 CHRNC CARE MGMT STAFF 1ST 20: CPT | Mod: S$PBB,,, | Performed by: INTERNAL MEDICINE

## 2025-05-31 PROCEDURE — 80048 BASIC METABOLIC PNL TOTAL CA: CPT

## 2025-05-31 PROCEDURE — 94799 UNLISTED PULMONARY SVC/PX: CPT

## 2025-05-31 PROCEDURE — 83735 ASSAY OF MAGNESIUM: CPT

## 2025-05-31 RX ORDER — HYDROXYZINE PAMOATE 25 MG/1
25 CAPSULE ORAL ONCE
Status: DISCONTINUED | OUTPATIENT
Start: 2025-06-01 | End: 2025-06-01

## 2025-05-31 RX ADMIN — ROFLUMILAST 500 MCG: 500 TABLET ORAL at 07:05

## 2025-05-31 RX ADMIN — DILTIAZEM HYDROCHLORIDE 120 MG: 30 TABLET, FILM COATED ORAL at 09:05

## 2025-05-31 RX ADMIN — DOXYCYCLINE HYCLATE 100 MG: 100 TABLET, FILM COATED ORAL at 09:05

## 2025-05-31 RX ADMIN — LEVETIRACETAM 500 MG: 500 TABLET, FILM COATED ORAL at 09:05

## 2025-05-31 RX ADMIN — ASPIRIN 81 MG: 81 TABLET, COATED ORAL at 08:05

## 2025-05-31 RX ADMIN — IPRATROPIUM BROMIDE 0.5 MG: 0.5 SOLUTION RESPIRATORY (INHALATION) at 04:05

## 2025-05-31 RX ADMIN — ATORVASTATIN CALCIUM 80 MG: 40 TABLET, FILM COATED ORAL at 10:05

## 2025-05-31 RX ADMIN — IPRATROPIUM BROMIDE 0.5 MG: 0.5 SOLUTION RESPIRATORY (INHALATION) at 12:05

## 2025-05-31 RX ADMIN — PANTOPRAZOLE SODIUM 40 MG: 40 TABLET, DELAYED RELEASE ORAL at 10:05

## 2025-05-31 RX ADMIN — AMITRIPTYLINE HYDROCHLORIDE 25 MG: 25 TABLET, FILM COATED ORAL at 09:05

## 2025-05-31 RX ADMIN — AMOXICILLIN AND CLAVULANATE POTASSIUM 1 TABLET: 875; 125 TABLET, FILM COATED ORAL at 10:05

## 2025-05-31 RX ADMIN — LEVALBUTEROL 1.25 MG: 1.25 SOLUTION, CONCENTRATE RESPIRATORY (INHALATION) at 11:05

## 2025-05-31 RX ADMIN — IPRATROPIUM BROMIDE 0.5 MG: 0.5 SOLUTION RESPIRATORY (INHALATION) at 03:05

## 2025-05-31 RX ADMIN — ENOXAPARIN SODIUM 40 MG: 40 INJECTION SUBCUTANEOUS at 05:05

## 2025-05-31 RX ADMIN — GUAIFENESIN 1200 MG: 600 TABLET, EXTENDED RELEASE ORAL at 09:05

## 2025-05-31 RX ADMIN — FUROSEMIDE 40 MG: 40 TABLET ORAL at 10:05

## 2025-05-31 RX ADMIN — DOXYCYCLINE HYCLATE 100 MG: 100 TABLET, FILM COATED ORAL at 10:05

## 2025-05-31 RX ADMIN — LEVALBUTEROL 1.25 MG: 1.25 SOLUTION, CONCENTRATE RESPIRATORY (INHALATION) at 03:05

## 2025-05-31 RX ADMIN — LEVALBUTEROL 1.25 MG: 1.25 SOLUTION, CONCENTRATE RESPIRATORY (INHALATION) at 07:05

## 2025-05-31 RX ADMIN — Medication 6 MG: at 09:05

## 2025-05-31 RX ADMIN — PREDNISONE 40 MG: 20 TABLET ORAL at 10:05

## 2025-05-31 RX ADMIN — IPRATROPIUM BROMIDE 0.5 MG: 0.5 SOLUTION RESPIRATORY (INHALATION) at 08:05

## 2025-05-31 RX ADMIN — LEVALBUTEROL 1.25 MG: 1.25 SOLUTION, CONCENTRATE RESPIRATORY (INHALATION) at 08:05

## 2025-05-31 RX ADMIN — GUAIFENESIN 1200 MG: 600 TABLET, EXTENDED RELEASE ORAL at 10:05

## 2025-05-31 RX ADMIN — DILTIAZEM HYDROCHLORIDE 120 MG: 30 TABLET, FILM COATED ORAL at 03:05

## 2025-05-31 RX ADMIN — SODIUM CHLORIDE SOLN NEBU 3% 4 ML: 3 NEBU SOLN at 08:05

## 2025-05-31 RX ADMIN — AMOXICILLIN AND CLAVULANATE POTASSIUM 1 TABLET: 875; 125 TABLET, FILM COATED ORAL at 09:05

## 2025-05-31 RX ADMIN — TIOTROPIUM BROMIDE INHALATION SPRAY 2 PUFF: 3.12 SPRAY, METERED RESPIRATORY (INHALATION) at 08:05

## 2025-05-31 RX ADMIN — SODIUM CHLORIDE SOLN NEBU 3% 4 ML: 3 NEBU SOLN at 07:05

## 2025-05-31 RX ADMIN — LEVALBUTEROL 1.25 MG: 1.25 SOLUTION, CONCENTRATE RESPIRATORY (INHALATION) at 12:05

## 2025-05-31 RX ADMIN — IPRATROPIUM BROMIDE 0.5 MG: 0.5 SOLUTION RESPIRATORY (INHALATION) at 07:05

## 2025-05-31 RX ADMIN — IPRATROPIUM BROMIDE 0.5 MG: 0.5 SOLUTION RESPIRATORY (INHALATION) at 11:05

## 2025-05-31 RX ADMIN — LEVALBUTEROL 1.25 MG: 1.25 SOLUTION, CONCENTRATE RESPIRATORY (INHALATION) at 04:05

## 2025-06-01 LAB
ABSOLUTE EOSINOPHIL (OHS): 0.01 K/UL
ABSOLUTE MONOCYTE (OHS): 0.85 K/UL (ref 0.3–1)
ABSOLUTE NEUTROPHIL COUNT (OHS): 14.64 K/UL (ref 1.8–7.7)
ABSOLUTE NEUTROPHIL MANUAL (OHS): 9.4 K/UL
ALBUMIN SERPL BCP-MCNC: 2.7 G/DL (ref 3.5–5.2)
ALP SERPL-CCNC: 110 UNIT/L (ref 40–150)
ALT SERPL W/O P-5'-P-CCNC: 69 UNIT/L (ref 10–44)
ANION GAP (OHS): 10 MMOL/L (ref 8–16)
ANION GAP (OHS): 13 MMOL/L (ref 8–16)
AST SERPL-CCNC: 21 UNIT/L (ref 11–45)
BASOPHILS # BLD AUTO: 0.08 K/UL
BASOPHILS NFR BLD AUTO: 0.5 %
BILIRUB SERPL-MCNC: 0.4 MG/DL (ref 0.1–1)
BUN SERPL-MCNC: 37 MG/DL (ref 8–23)
BUN SERPL-MCNC: 43 MG/DL (ref 8–23)
CALCIUM SERPL-MCNC: 8.8 MG/DL (ref 8.7–10.5)
CALCIUM SERPL-MCNC: 9.3 MG/DL (ref 8.7–10.5)
CHLORIDE SERPL-SCNC: 101 MMOL/L (ref 95–110)
CHLORIDE SERPL-SCNC: 105 MMOL/L (ref 95–110)
CO2 SERPL-SCNC: 27 MMOL/L (ref 23–29)
CO2 SERPL-SCNC: 29 MMOL/L (ref 23–29)
CREAT SERPL-MCNC: 0.9 MG/DL (ref 0.5–1.4)
CREAT SERPL-MCNC: 0.9 MG/DL (ref 0.5–1.4)
EOSINOPHIL NFR BLD MANUAL: 1 % (ref 0–8)
ERYTHROCYTE [DISTWIDTH] IN BLOOD BY AUTOMATED COUNT: 16 % (ref 11.5–14.5)
ERYTHROCYTE [DISTWIDTH] IN BLOOD BY AUTOMATED COUNT: 16.2 % (ref 11.5–14.5)
GFR SERPLBLD CREATININE-BSD FMLA CKD-EPI: >60 ML/MIN/1.73/M2
GFR SERPLBLD CREATININE-BSD FMLA CKD-EPI: >60 ML/MIN/1.73/M2
GLUCOSE SERPL-MCNC: 117 MG/DL (ref 70–110)
GLUCOSE SERPL-MCNC: 188 MG/DL (ref 70–110)
HCT VFR BLD AUTO: 43.6 % (ref 40–54)
HCT VFR BLD AUTO: 48.8 % (ref 40–54)
HGB BLD-MCNC: 13.1 GM/DL (ref 14–18)
HGB BLD-MCNC: 15.2 GM/DL (ref 14–18)
IMM GRANULOCYTES # BLD AUTO: 0.67 K/UL (ref 0–0.04)
IMM GRANULOCYTES NFR BLD AUTO: 4.1 % (ref 0–0.5)
LACTATE SERPL-SCNC: 3 MMOL/L (ref 0.5–2.2)
LYMPHOCYTES # BLD AUTO: 0.29 K/UL (ref 1–4.8)
LYMPHOCYTES NFR BLD MANUAL: 3 % (ref 18–48)
MAGNESIUM SERPL-MCNC: 1.8 MG/DL (ref 1.6–2.6)
MAGNESIUM SERPL-MCNC: 2.2 MG/DL (ref 1.6–2.6)
MCH RBC QN AUTO: 27.4 PG (ref 27–31)
MCH RBC QN AUTO: 27.9 PG (ref 27–31)
MCHC RBC AUTO-ENTMCNC: 30 G/DL (ref 32–36)
MCHC RBC AUTO-ENTMCNC: 31.1 G/DL (ref 32–36)
MCV RBC AUTO: 90 FL (ref 82–98)
MCV RBC AUTO: 91 FL (ref 82–98)
MONOCYTES NFR BLD MANUAL: 1 % (ref 4–15)
NEUTROPHILS NFR BLD MANUAL: 95 % (ref 38–73)
NUCLEATED RBC (/100WBC) (OHS): 0 /100 WBC
NUCLEATED RBC (/100WBC) (OHS): 0 /100 WBC
PHOSPHATE SERPL-MCNC: 2.1 MG/DL (ref 2.7–4.5)
PHOSPHATE SERPL-MCNC: 2.7 MG/DL (ref 2.7–4.5)
PLATELET # BLD AUTO: 226 K/UL (ref 150–450)
PLATELET # BLD AUTO: 252 K/UL (ref 150–450)
PLATELET BLD QL SMEAR: ABNORMAL
PMV BLD AUTO: 10 FL (ref 9.2–12.9)
PMV BLD AUTO: 10.2 FL (ref 9.2–12.9)
POTASSIUM SERPL-SCNC: 3.3 MMOL/L (ref 3.5–5.1)
POTASSIUM SERPL-SCNC: 3.9 MMOL/L (ref 3.5–5.1)
PROT SERPL-MCNC: 5.6 GM/DL (ref 6–8.4)
RBC # BLD AUTO: 4.78 M/UL (ref 4.6–6.2)
RBC # BLD AUTO: 5.44 M/UL (ref 4.6–6.2)
RELATIVE EOSINOPHIL (OHS): 0.1 %
RELATIVE LYMPHOCYTE (OHS): 1.8 % (ref 18–48)
RELATIVE MONOCYTE (OHS): 5.1 % (ref 4–15)
RELATIVE NEUTROPHIL (OHS): 88.4 % (ref 38–73)
SODIUM SERPL-SCNC: 141 MMOL/L (ref 136–145)
SODIUM SERPL-SCNC: 144 MMOL/L (ref 136–145)
T4 FREE SERPL-MCNC: 0.99 NG/DL (ref 0.71–1.51)
T4 SERPL-MCNC: 6.6 UG/DL (ref 4.5–11.5)
TROPONIN I SERPL HS-MCNC: 11 NG/L
TSH SERPL-ACNC: 0.26 UIU/ML (ref 0.4–4)
WBC # BLD AUTO: 16.54 K/UL (ref 3.9–12.7)
WBC # BLD AUTO: 9.86 K/UL (ref 3.9–12.7)

## 2025-06-01 PROCEDURE — 94668 MNPJ CHEST WALL SBSQ: CPT

## 2025-06-01 PROCEDURE — 94761 N-INVAS EAR/PLS OXIMETRY MLT: CPT

## 2025-06-01 PROCEDURE — 27000207 HC ISOLATION

## 2025-06-01 PROCEDURE — 83605 ASSAY OF LACTIC ACID: CPT

## 2025-06-01 PROCEDURE — 63600175 PHARM REV CODE 636 W HCPCS

## 2025-06-01 PROCEDURE — 85027 COMPLETE CBC AUTOMATED: CPT

## 2025-06-01 PROCEDURE — 20600001 HC STEP DOWN PRIVATE ROOM

## 2025-06-01 PROCEDURE — 25000003 PHARM REV CODE 250: Performed by: STUDENT IN AN ORGANIZED HEALTH CARE EDUCATION/TRAINING PROGRAM

## 2025-06-01 PROCEDURE — 84439 ASSAY OF FREE THYROXINE: CPT

## 2025-06-01 PROCEDURE — 84436 ASSAY OF TOTAL THYROXINE: CPT

## 2025-06-01 PROCEDURE — 80053 COMPREHEN METABOLIC PANEL: CPT

## 2025-06-01 PROCEDURE — 25000003 PHARM REV CODE 250

## 2025-06-01 PROCEDURE — 84100 ASSAY OF PHOSPHORUS: CPT

## 2025-06-01 PROCEDURE — 27100171 HC OXYGEN HIGH FLOW UP TO 24 HOURS

## 2025-06-01 PROCEDURE — 94664 DEMO&/EVAL PT USE INHALER: CPT

## 2025-06-01 PROCEDURE — 94640 AIRWAY INHALATION TREATMENT: CPT

## 2025-06-01 PROCEDURE — 25000242 PHARM REV CODE 250 ALT 637 W/ HCPCS: Performed by: STUDENT IN AN ORGANIZED HEALTH CARE EDUCATION/TRAINING PROGRAM

## 2025-06-01 PROCEDURE — 99900035 HC TECH TIME PER 15 MIN (STAT)

## 2025-06-01 PROCEDURE — 36415 COLL VENOUS BLD VENIPUNCTURE: CPT

## 2025-06-01 PROCEDURE — 94660 CPAP INITIATION&MGMT: CPT

## 2025-06-01 PROCEDURE — 93010 ELECTROCARDIOGRAM REPORT: CPT | Mod: ,,, | Performed by: INTERNAL MEDICINE

## 2025-06-01 PROCEDURE — 93005 ELECTROCARDIOGRAM TRACING: CPT

## 2025-06-01 PROCEDURE — 83735 ASSAY OF MAGNESIUM: CPT

## 2025-06-01 PROCEDURE — 63600175 PHARM REV CODE 636 W HCPCS: Performed by: STUDENT IN AN ORGANIZED HEALTH CARE EDUCATION/TRAINING PROGRAM

## 2025-06-01 PROCEDURE — 84484 ASSAY OF TROPONIN QUANT: CPT

## 2025-06-01 PROCEDURE — 87205 SMEAR GRAM STAIN: CPT

## 2025-06-01 PROCEDURE — 84443 ASSAY THYROID STIM HORMONE: CPT

## 2025-06-01 RX ORDER — MAGNESIUM SULFATE HEPTAHYDRATE 40 MG/ML
2 INJECTION, SOLUTION INTRAVENOUS ONCE
Status: COMPLETED | OUTPATIENT
Start: 2025-06-01 | End: 2025-06-01

## 2025-06-01 RX ORDER — SODIUM CHLORIDE, SODIUM LACTATE, POTASSIUM CHLORIDE, CALCIUM CHLORIDE 600; 310; 30; 20 MG/100ML; MG/100ML; MG/100ML; MG/100ML
INJECTION, SOLUTION INTRAVENOUS CONTINUOUS
Status: ACTIVE | OUTPATIENT
Start: 2025-06-01 | End: 2025-06-02

## 2025-06-01 RX ORDER — HYDROXYZINE HYDROCHLORIDE 25 MG/1
25 TABLET, FILM COATED ORAL 3 TIMES DAILY PRN
Status: DISCONTINUED | OUTPATIENT
Start: 2025-06-01 | End: 2025-06-03 | Stop reason: HOSPADM

## 2025-06-01 RX ORDER — POTASSIUM CHLORIDE 20 MEQ/1
40 TABLET, EXTENDED RELEASE ORAL ONCE
Status: COMPLETED | OUTPATIENT
Start: 2025-06-01 | End: 2025-06-01

## 2025-06-01 RX ADMIN — AMOXICILLIN AND CLAVULANATE POTASSIUM 1 TABLET: 875; 125 TABLET, FILM COATED ORAL at 10:06

## 2025-06-01 RX ADMIN — LEVALBUTEROL 1.25 MG: 1.25 SOLUTION, CONCENTRATE RESPIRATORY (INHALATION) at 12:06

## 2025-06-01 RX ADMIN — ENOXAPARIN SODIUM 40 MG: 40 INJECTION SUBCUTANEOUS at 05:06

## 2025-06-01 RX ADMIN — LEVALBUTEROL 1.25 MG: 1.25 SOLUTION, CONCENTRATE RESPIRATORY (INHALATION) at 04:06

## 2025-06-01 RX ADMIN — PREDNISONE 40 MG: 20 TABLET ORAL at 10:06

## 2025-06-01 RX ADMIN — LEVETIRACETAM 500 MG: 500 TABLET, FILM COATED ORAL at 09:06

## 2025-06-01 RX ADMIN — MAGNESIUM SULFATE HEPTAHYDRATE 2 G: 40 INJECTION, SOLUTION INTRAVENOUS at 10:06

## 2025-06-01 RX ADMIN — DILTIAZEM HYDROCHLORIDE 120 MG: 30 TABLET, FILM COATED ORAL at 02:06

## 2025-06-01 RX ADMIN — GUAIFENESIN 1200 MG: 600 TABLET, EXTENDED RELEASE ORAL at 09:06

## 2025-06-01 RX ADMIN — FUROSEMIDE 40 MG: 40 TABLET ORAL at 10:06

## 2025-06-01 RX ADMIN — IPRATROPIUM BROMIDE 0.5 MG: 0.5 SOLUTION RESPIRATORY (INHALATION) at 08:06

## 2025-06-01 RX ADMIN — GUAIFENESIN 1200 MG: 600 TABLET, EXTENDED RELEASE ORAL at 10:06

## 2025-06-01 RX ADMIN — DILTIAZEM HYDROCHLORIDE 120 MG: 30 TABLET, FILM COATED ORAL at 06:06

## 2025-06-01 RX ADMIN — AMOXICILLIN AND CLAVULANATE POTASSIUM 1 TABLET: 875; 125 TABLET, FILM COATED ORAL at 09:06

## 2025-06-01 RX ADMIN — ASPIRIN 81 MG: 81 TABLET, COATED ORAL at 07:06

## 2025-06-01 RX ADMIN — HYDROXYZINE HYDROCHLORIDE 25 MG: 25 TABLET, FILM COATED ORAL at 05:06

## 2025-06-01 RX ADMIN — ATORVASTATIN CALCIUM 80 MG: 40 TABLET, FILM COATED ORAL at 10:06

## 2025-06-01 RX ADMIN — DILTIAZEM HYDROCHLORIDE 120 MG: 30 TABLET, FILM COATED ORAL at 09:06

## 2025-06-01 RX ADMIN — SODIUM CHLORIDE SOLN NEBU 3% 4 ML: 3 NEBU SOLN at 08:06

## 2025-06-01 RX ADMIN — AMITRIPTYLINE HYDROCHLORIDE 25 MG: 25 TABLET, FILM COATED ORAL at 09:06

## 2025-06-01 RX ADMIN — SODIUM CHLORIDE, POTASSIUM CHLORIDE, SODIUM LACTATE AND CALCIUM CHLORIDE: 600; 310; 30; 20 INJECTION, SOLUTION INTRAVENOUS at 09:06

## 2025-06-01 RX ADMIN — PANTOPRAZOLE SODIUM 40 MG: 40 TABLET, DELAYED RELEASE ORAL at 10:06

## 2025-06-01 RX ADMIN — ROFLUMILAST 500 MCG: 500 TABLET ORAL at 08:06

## 2025-06-01 RX ADMIN — Medication 6 MG: at 09:06

## 2025-06-01 RX ADMIN — LEVALBUTEROL 1.25 MG: 1.25 SOLUTION, CONCENTRATE RESPIRATORY (INHALATION) at 08:06

## 2025-06-01 RX ADMIN — TIOTROPIUM BROMIDE INHALATION SPRAY 2 PUFF: 3.12 SPRAY, METERED RESPIRATORY (INHALATION) at 08:06

## 2025-06-01 RX ADMIN — POTASSIUM CHLORIDE 40 MEQ: 1500 TABLET, EXTENDED RELEASE ORAL at 10:06

## 2025-06-01 RX ADMIN — IPRATROPIUM BROMIDE 0.5 MG: 0.5 SOLUTION RESPIRATORY (INHALATION) at 12:06

## 2025-06-01 RX ADMIN — DOXYCYCLINE HYCLATE 100 MG: 100 TABLET, FILM COATED ORAL at 10:06

## 2025-06-01 RX ADMIN — IPRATROPIUM BROMIDE 0.5 MG: 0.5 SOLUTION RESPIRATORY (INHALATION) at 04:06

## 2025-06-02 LAB
ABSOLUTE NEUTROPHIL MANUAL (OHS): 10.6 K/UL
ALBUMIN SERPL BCP-MCNC: 2.5 G/DL (ref 3.5–5.2)
ALP SERPL-CCNC: 98 UNIT/L (ref 40–150)
ALT SERPL W/O P-5'-P-CCNC: 58 UNIT/L (ref 10–44)
ANION GAP (OHS): 10 MMOL/L (ref 8–16)
AST SERPL-CCNC: 17 UNIT/L (ref 11–45)
BILIRUB SERPL-MCNC: 0.5 MG/DL (ref 0.1–1)
BUN SERPL-MCNC: 42 MG/DL (ref 8–23)
CALCIUM SERPL-MCNC: 8.6 MG/DL (ref 8.7–10.5)
CHLORIDE SERPL-SCNC: 103 MMOL/L (ref 95–110)
CO2 SERPL-SCNC: 29 MMOL/L (ref 23–29)
CREAT SERPL-MCNC: 0.9 MG/DL (ref 0.5–1.4)
ERYTHROCYTE [DISTWIDTH] IN BLOOD BY AUTOMATED COUNT: 15.9 % (ref 11.5–14.5)
GFR SERPLBLD CREATININE-BSD FMLA CKD-EPI: >60 ML/MIN/1.73/M2
GLUCOSE SERPL-MCNC: 137 MG/DL (ref 70–110)
HCT VFR BLD AUTO: 42.2 % (ref 40–54)
HGB BLD-MCNC: 13.1 GM/DL (ref 14–18)
LACTATE SERPL-SCNC: 1.5 MMOL/L (ref 0.5–2.2)
LYMPHOCYTES NFR BLD MANUAL: 1 % (ref 18–48)
MAGNESIUM SERPL-MCNC: 2 MG/DL (ref 1.6–2.6)
MCH RBC QN AUTO: 28.2 PG (ref 27–31)
MCHC RBC AUTO-ENTMCNC: 31 G/DL (ref 32–36)
MCV RBC AUTO: 91 FL (ref 82–98)
MONOCYTES NFR BLD MANUAL: 3 % (ref 4–15)
NEUTROPHILS NFR BLD MANUAL: 96 % (ref 38–73)
NUCLEATED RBC (/100WBC) (OHS): 0 /100 WBC
OHS QRS DURATION: 62 MS
OHS QTC CALCULATION: 433 MS
PHOSPHATE SERPL-MCNC: 3 MG/DL (ref 2.7–4.5)
PLATELET # BLD AUTO: 173 K/UL (ref 150–450)
PMV BLD AUTO: 10.3 FL (ref 9.2–12.9)
POTASSIUM SERPL-SCNC: 4.1 MMOL/L (ref 3.5–5.1)
PROT SERPL-MCNC: 5.3 GM/DL (ref 6–8.4)
RBC # BLD AUTO: 4.64 M/UL (ref 4.6–6.2)
SODIUM SERPL-SCNC: 142 MMOL/L (ref 136–145)
SPHEROCYTES BLD QL SMEAR: ABNORMAL
WBC # BLD AUTO: 11.04 K/UL (ref 3.9–12.7)

## 2025-06-02 PROCEDURE — 25000003 PHARM REV CODE 250

## 2025-06-02 PROCEDURE — 27000207 HC ISOLATION

## 2025-06-02 PROCEDURE — 25000003 PHARM REV CODE 250: Performed by: STUDENT IN AN ORGANIZED HEALTH CARE EDUCATION/TRAINING PROGRAM

## 2025-06-02 PROCEDURE — 94640 AIRWAY INHALATION TREATMENT: CPT

## 2025-06-02 PROCEDURE — 25000242 PHARM REV CODE 250 ALT 637 W/ HCPCS: Performed by: STUDENT IN AN ORGANIZED HEALTH CARE EDUCATION/TRAINING PROGRAM

## 2025-06-02 PROCEDURE — 83605 ASSAY OF LACTIC ACID: CPT

## 2025-06-02 PROCEDURE — 20600001 HC STEP DOWN PRIVATE ROOM

## 2025-06-02 PROCEDURE — 83735 ASSAY OF MAGNESIUM: CPT

## 2025-06-02 PROCEDURE — 94761 N-INVAS EAR/PLS OXIMETRY MLT: CPT

## 2025-06-02 PROCEDURE — 94660 CPAP INITIATION&MGMT: CPT

## 2025-06-02 PROCEDURE — 84100 ASSAY OF PHOSPHORUS: CPT

## 2025-06-02 PROCEDURE — 80053 COMPREHEN METABOLIC PANEL: CPT

## 2025-06-02 PROCEDURE — 36415 COLL VENOUS BLD VENIPUNCTURE: CPT

## 2025-06-02 PROCEDURE — 27100171 HC OXYGEN HIGH FLOW UP TO 24 HOURS

## 2025-06-02 PROCEDURE — 94668 MNPJ CHEST WALL SBSQ: CPT

## 2025-06-02 PROCEDURE — 97535 SELF CARE MNGMENT TRAINING: CPT

## 2025-06-02 PROCEDURE — 63600175 PHARM REV CODE 636 W HCPCS

## 2025-06-02 PROCEDURE — 99900035 HC TECH TIME PER 15 MIN (STAT)

## 2025-06-02 PROCEDURE — 85027 COMPLETE CBC AUTOMATED: CPT

## 2025-06-02 PROCEDURE — 63600175 PHARM REV CODE 636 W HCPCS: Performed by: STUDENT IN AN ORGANIZED HEALTH CARE EDUCATION/TRAINING PROGRAM

## 2025-06-02 RX ADMIN — LEVALBUTEROL 1.25 MG: 1.25 SOLUTION, CONCENTRATE RESPIRATORY (INHALATION) at 12:06

## 2025-06-02 RX ADMIN — IPRATROPIUM BROMIDE 0.5 MG: 0.5 SOLUTION RESPIRATORY (INHALATION) at 12:06

## 2025-06-02 RX ADMIN — LEVETIRACETAM 500 MG: 500 TABLET, FILM COATED ORAL at 09:06

## 2025-06-02 RX ADMIN — POLYETHYLENE GLYCOL 3350 17 G: 17 POWDER, FOR SOLUTION ORAL at 09:06

## 2025-06-02 RX ADMIN — SODIUM CHLORIDE SOLN NEBU 3% 4 ML: 3 NEBU SOLN at 07:06

## 2025-06-02 RX ADMIN — GUAIFENESIN 1200 MG: 600 TABLET, EXTENDED RELEASE ORAL at 09:06

## 2025-06-02 RX ADMIN — LEVALBUTEROL 1.25 MG: 1.25 SOLUTION, CONCENTRATE RESPIRATORY (INHALATION) at 04:06

## 2025-06-02 RX ADMIN — DILTIAZEM HYDROCHLORIDE 120 MG: 30 TABLET, FILM COATED ORAL at 02:06

## 2025-06-02 RX ADMIN — IPRATROPIUM BROMIDE 0.5 MG: 0.5 SOLUTION RESPIRATORY (INHALATION) at 04:06

## 2025-06-02 RX ADMIN — DILTIAZEM HYDROCHLORIDE 120 MG: 30 TABLET, FILM COATED ORAL at 06:06

## 2025-06-02 RX ADMIN — DILTIAZEM HYDROCHLORIDE 120 MG: 30 TABLET, FILM COATED ORAL at 09:06

## 2025-06-02 RX ADMIN — LEVALBUTEROL 1.25 MG: 1.25 SOLUTION, CONCENTRATE RESPIRATORY (INHALATION) at 07:06

## 2025-06-02 RX ADMIN — IPRATROPIUM BROMIDE 0.5 MG: 0.5 SOLUTION RESPIRATORY (INHALATION) at 07:06

## 2025-06-02 RX ADMIN — PANTOPRAZOLE SODIUM 40 MG: 40 TABLET, DELAYED RELEASE ORAL at 09:06

## 2025-06-02 RX ADMIN — AMITRIPTYLINE HYDROCHLORIDE 25 MG: 25 TABLET, FILM COATED ORAL at 09:06

## 2025-06-02 RX ADMIN — ENOXAPARIN SODIUM 40 MG: 40 INJECTION SUBCUTANEOUS at 05:06

## 2025-06-02 RX ADMIN — ROFLUMILAST 500 MCG: 500 TABLET ORAL at 09:06

## 2025-06-02 RX ADMIN — Medication 6 MG: at 09:06

## 2025-06-02 RX ADMIN — FUROSEMIDE 40 MG: 40 TABLET ORAL at 09:06

## 2025-06-02 RX ADMIN — SODIUM CHLORIDE SOLN NEBU 3% 4 ML: 3 NEBU SOLN at 08:06

## 2025-06-02 RX ADMIN — TIOTROPIUM BROMIDE INHALATION SPRAY 2 PUFF: 3.12 SPRAY, METERED RESPIRATORY (INHALATION) at 07:06

## 2025-06-02 RX ADMIN — ASPIRIN 81 MG: 81 TABLET, COATED ORAL at 09:06

## 2025-06-02 RX ADMIN — LEVALBUTEROL 1.25 MG: 1.25 SOLUTION, CONCENTRATE RESPIRATORY (INHALATION) at 03:06

## 2025-06-02 RX ADMIN — PREDNISONE 40 MG: 20 TABLET ORAL at 09:06

## 2025-06-02 RX ADMIN — ATORVASTATIN CALCIUM 80 MG: 40 TABLET, FILM COATED ORAL at 09:06

## 2025-06-03 ENCOUNTER — HOSPITAL ENCOUNTER (INPATIENT)
Age: 80
LOS: 37 days | Discharge: HOSPICE | DRG: 189 | End: 2025-07-10
Attending: HOSPITALIST | Admitting: HOSPITALIST
Payer: MEDICARE

## 2025-06-03 VITALS
BODY MASS INDEX: 31.34 KG/M2 | TEMPERATURE: 98 F | OXYGEN SATURATION: 98 % | WEIGHT: 211.63 LBS | DIASTOLIC BLOOD PRESSURE: 85 MMHG | HEIGHT: 69 IN | HEART RATE: 100 BPM | RESPIRATION RATE: 18 BRPM | SYSTOLIC BLOOD PRESSURE: 124 MMHG

## 2025-06-03 DIAGNOSIS — R07.9 CHEST PAIN: ICD-10-CM

## 2025-06-03 DIAGNOSIS — J96.21 ACUTE ON CHRONIC RESPIRATORY FAILURE WITH HYPOXIA: Primary | ICD-10-CM

## 2025-06-03 DIAGNOSIS — R00.0 TACHYCARDIA: ICD-10-CM

## 2025-06-03 DIAGNOSIS — I48.91 ATRIAL FIBRILLATION WITH RAPID VENTRICULAR RESPONSE: ICD-10-CM

## 2025-06-03 DIAGNOSIS — J44.1 COPD EXACERBATION: ICD-10-CM

## 2025-06-03 LAB
ABSOLUTE NEUTROPHIL MANUAL (OHS): 11.5 K/UL
ALBUMIN SERPL BCP-MCNC: 2.5 G/DL (ref 3.5–5.2)
ALP SERPL-CCNC: 92 UNIT/L (ref 40–150)
ALT SERPL W/O P-5'-P-CCNC: 54 UNIT/L (ref 10–44)
ANION GAP (OHS): 10 MMOL/L (ref 8–16)
AST SERPL-CCNC: 19 UNIT/L (ref 11–45)
BACTERIA SPT CULT: NORMAL
BILIRUB SERPL-MCNC: 0.6 MG/DL (ref 0.1–1)
BUN SERPL-MCNC: 40 MG/DL (ref 8–23)
CALCIUM SERPL-MCNC: 8.6 MG/DL (ref 8.7–10.5)
CHLORIDE SERPL-SCNC: 103 MMOL/L (ref 95–110)
CO2 SERPL-SCNC: 29 MMOL/L (ref 23–29)
CREAT SERPL-MCNC: 1 MG/DL (ref 0.5–1.4)
ERYTHROCYTE [DISTWIDTH] IN BLOOD BY AUTOMATED COUNT: 15.9 % (ref 11.5–14.5)
GFR SERPLBLD CREATININE-BSD FMLA CKD-EPI: >60 ML/MIN/1.73/M2
GLUCOSE SERPL-MCNC: 136 MG/DL (ref 70–110)
GRAM STN SPEC: NORMAL
HCT VFR BLD AUTO: 40.6 % (ref 40–54)
HGB BLD-MCNC: 12.5 GM/DL (ref 14–18)
LYMPHOCYTES NFR BLD MANUAL: 2 % (ref 18–48)
MAGNESIUM SERPL-MCNC: 1.9 MG/DL (ref 1.6–2.6)
MCH RBC QN AUTO: 27.5 PG (ref 27–31)
MCHC RBC AUTO-ENTMCNC: 30.8 G/DL (ref 32–36)
MCV RBC AUTO: 89 FL (ref 82–98)
MONOCYTES NFR BLD MANUAL: 5 % (ref 4–15)
MYELOCYTES NFR BLD MANUAL: 1 %
NEUTROPHILS NFR BLD MANUAL: 91 % (ref 38–73)
NEUTS BAND NFR BLD MANUAL: 1 %
NUCLEATED RBC (/100WBC) (OHS): 0 /100 WBC
OVALOCYTES BLD QL SMEAR: ABNORMAL
PHOSPHATE SERPL-MCNC: 2.7 MG/DL (ref 2.7–4.5)
PLATELET # BLD AUTO: 175 K/UL (ref 150–450)
PLATELET BLD QL SMEAR: ABNORMAL
PMV BLD AUTO: 10.8 FL (ref 9.2–12.9)
POTASSIUM SERPL-SCNC: 4.3 MMOL/L (ref 3.5–5.1)
PROT SERPL-MCNC: 5.3 GM/DL (ref 6–8.4)
RBC # BLD AUTO: 4.54 M/UL (ref 4.6–6.2)
SODIUM SERPL-SCNC: 142 MMOL/L (ref 136–145)
WBC # BLD AUTO: 12.52 K/UL (ref 3.9–12.7)

## 2025-06-03 PROCEDURE — 25000242 PHARM REV CODE 250 ALT 637 W/ HCPCS: Performed by: STUDENT IN AN ORGANIZED HEALTH CARE EDUCATION/TRAINING PROGRAM

## 2025-06-03 PROCEDURE — 83735 ASSAY OF MAGNESIUM: CPT

## 2025-06-03 PROCEDURE — 94640 AIRWAY INHALATION TREATMENT: CPT

## 2025-06-03 PROCEDURE — 5A0935A ASSISTANCE WITH RESPIRATORY VENTILATION, LESS THAN 24 CONSECUTIVE HOURS, HIGH NASAL FLOW/VELOCITY: ICD-10-PCS | Performed by: HOSPITALIST

## 2025-06-03 PROCEDURE — 36415 COLL VENOUS BLD VENIPUNCTURE: CPT

## 2025-06-03 PROCEDURE — 11000001 HC ACUTE MED/SURG PRIVATE ROOM

## 2025-06-03 PROCEDURE — 25000003 PHARM REV CODE 250

## 2025-06-03 PROCEDURE — 97530 THERAPEUTIC ACTIVITIES: CPT

## 2025-06-03 PROCEDURE — 99900031 HC PATIENT EDUCATION (STAT)

## 2025-06-03 PROCEDURE — 63600175 PHARM REV CODE 636 W HCPCS

## 2025-06-03 PROCEDURE — 25000242 PHARM REV CODE 250 ALT 637 W/ HCPCS

## 2025-06-03 PROCEDURE — 94761 N-INVAS EAR/PLS OXIMETRY MLT: CPT

## 2025-06-03 PROCEDURE — 85027 COMPLETE CBC AUTOMATED: CPT

## 2025-06-03 PROCEDURE — 25000003 PHARM REV CODE 250: Performed by: STUDENT IN AN ORGANIZED HEALTH CARE EDUCATION/TRAINING PROGRAM

## 2025-06-03 PROCEDURE — 94668 MNPJ CHEST WALL SBSQ: CPT

## 2025-06-03 PROCEDURE — 99900035 HC TECH TIME PER 15 MIN (STAT)

## 2025-06-03 PROCEDURE — 5A09357 ASSISTANCE WITH RESPIRATORY VENTILATION, LESS THAN 24 CONSECUTIVE HOURS, CONTINUOUS POSITIVE AIRWAY PRESSURE: ICD-10-PCS | Performed by: HOSPITALIST

## 2025-06-03 PROCEDURE — 27000221 HC OXYGEN, UP TO 24 HOURS

## 2025-06-03 PROCEDURE — 84100 ASSAY OF PHOSPHORUS: CPT

## 2025-06-03 PROCEDURE — 80053 COMPREHEN METABOLIC PANEL: CPT

## 2025-06-03 PROCEDURE — 27100171 HC OXYGEN HIGH FLOW UP TO 24 HOURS

## 2025-06-03 RX ORDER — DILTIAZEM HYDROCHLORIDE 30 MG/1
120 TABLET, FILM COATED ORAL EVERY 8 HOURS
Status: DISCONTINUED | OUTPATIENT
Start: 2025-06-03 | End: 2025-06-12

## 2025-06-03 RX ORDER — LEVALBUTEROL 1.25 MG/.5ML
1.25 SOLUTION, CONCENTRATE RESPIRATORY (INHALATION) EVERY 4 HOURS
Status: CANCELLED | OUTPATIENT
Start: 2025-06-03

## 2025-06-03 RX ORDER — HYDROXYZINE HYDROCHLORIDE 25 MG/1
25 TABLET, FILM COATED ORAL 3 TIMES DAILY PRN
Status: CANCELLED | OUTPATIENT
Start: 2025-06-03

## 2025-06-03 RX ORDER — OXYCODONE HYDROCHLORIDE 10 MG/1
10 TABLET ORAL EVERY 6 HOURS PRN
Refills: 0 | Status: CANCELLED | OUTPATIENT
Start: 2025-06-03

## 2025-06-03 RX ORDER — ROFLUMILAST 500 UG/1
500 TABLET ORAL EVERY MORNING
Status: DISCONTINUED | OUTPATIENT
Start: 2025-06-04 | End: 2025-06-05

## 2025-06-03 RX ORDER — GUAIFENESIN 600 MG/1
1200 TABLET, EXTENDED RELEASE ORAL 2 TIMES DAILY
Status: CANCELLED | OUTPATIENT
Start: 2025-06-03

## 2025-06-03 RX ORDER — ATORVASTATIN CALCIUM 80 MG/1
80 TABLET, FILM COATED ORAL DAILY
Status: DISCONTINUED | OUTPATIENT
Start: 2025-06-04 | End: 2025-07-02

## 2025-06-03 RX ORDER — ACETAMINOPHEN 325 MG/1
650 TABLET ORAL EVERY 8 HOURS PRN
Status: DISCONTINUED | OUTPATIENT
Start: 2025-06-03 | End: 2025-07-10 | Stop reason: HOSPADM

## 2025-06-03 RX ORDER — ACETAMINOPHEN 325 MG/1
650 TABLET ORAL EVERY 8 HOURS PRN
Status: CANCELLED | OUTPATIENT
Start: 2025-06-03

## 2025-06-03 RX ORDER — ALBUTEROL SULFATE 0.83 MG/ML
0.63 SOLUTION RESPIRATORY (INHALATION) EVERY 6 HOURS PRN
Status: DISCONTINUED | OUTPATIENT
Start: 2025-06-03 | End: 2025-06-22

## 2025-06-03 RX ORDER — ATORVASTATIN CALCIUM 40 MG/1
80 TABLET, FILM COATED ORAL DAILY
Status: CANCELLED | OUTPATIENT
Start: 2025-06-04

## 2025-06-03 RX ORDER — LEVETIRACETAM 500 MG/1
500 TABLET ORAL 2 TIMES DAILY
Status: CANCELLED | OUTPATIENT
Start: 2025-06-03

## 2025-06-03 RX ORDER — LEVETIRACETAM 500 MG/1
500 TABLET ORAL 2 TIMES DAILY
Status: DISCONTINUED | OUTPATIENT
Start: 2025-06-03 | End: 2025-07-10 | Stop reason: HOSPADM

## 2025-06-03 RX ORDER — IPRATROPIUM BROMIDE 0.5 MG/2.5ML
0.5 SOLUTION RESPIRATORY (INHALATION) EVERY 4 HOURS
Status: DISCONTINUED | OUTPATIENT
Start: 2025-06-03 | End: 2025-06-03 | Stop reason: ALTCHOICE

## 2025-06-03 RX ORDER — NALOXONE HCL 0.4 MG/ML
0.02 VIAL (ML) INJECTION
Status: DISCONTINUED | OUTPATIENT
Start: 2025-06-03 | End: 2025-07-09

## 2025-06-03 RX ORDER — LEVALBUTEROL INHALATION SOLUTION 0.63 MG/3ML
1.25 SOLUTION RESPIRATORY (INHALATION) EVERY 4 HOURS
Status: DISCONTINUED | OUTPATIENT
Start: 2025-06-03 | End: 2025-06-22

## 2025-06-03 RX ORDER — IPRATROPIUM BROMIDE 0.5 MG/2.5ML
0.5 SOLUTION RESPIRATORY (INHALATION) EVERY 4 HOURS
Status: CANCELLED | OUTPATIENT
Start: 2025-06-03

## 2025-06-03 RX ORDER — ENOXAPARIN SODIUM 100 MG/ML
40 INJECTION SUBCUTANEOUS EVERY 24 HOURS
Status: DISCONTINUED | OUTPATIENT
Start: 2025-06-03 | End: 2025-06-12

## 2025-06-03 RX ORDER — ENOXAPARIN SODIUM 100 MG/ML
40 INJECTION SUBCUTANEOUS EVERY 24 HOURS
Status: CANCELLED | OUTPATIENT
Start: 2025-06-03

## 2025-06-03 RX ORDER — ASPIRIN 81 MG/1
81 TABLET ORAL EVERY MORNING
Status: DISCONTINUED | OUTPATIENT
Start: 2025-06-04 | End: 2025-06-27

## 2025-06-03 RX ORDER — AMITRIPTYLINE HYDROCHLORIDE 25 MG/1
25 TABLET, FILM COATED ORAL NIGHTLY
Status: CANCELLED | OUTPATIENT
Start: 2025-06-03

## 2025-06-03 RX ORDER — PANTOPRAZOLE SODIUM 40 MG/1
40 TABLET, DELAYED RELEASE ORAL DAILY
Status: DISCONTINUED | OUTPATIENT
Start: 2025-06-04 | End: 2025-07-02

## 2025-06-03 RX ORDER — ROFLUMILAST 500 UG/1
500 TABLET ORAL EVERY MORNING
Status: CANCELLED | OUTPATIENT
Start: 2025-06-04

## 2025-06-03 RX ORDER — NALOXONE HCL 0.4 MG/ML
0.02 VIAL (ML) INJECTION
Status: CANCELLED | OUTPATIENT
Start: 2025-06-03

## 2025-06-03 RX ORDER — FUROSEMIDE 40 MG/1
40 TABLET ORAL DAILY
Status: DISCONTINUED | OUTPATIENT
Start: 2025-06-04 | End: 2025-06-12

## 2025-06-03 RX ORDER — DILTIAZEM HYDROCHLORIDE 30 MG/1
120 TABLET, FILM COATED ORAL EVERY 8 HOURS
Status: CANCELLED | OUTPATIENT
Start: 2025-06-03

## 2025-06-03 RX ORDER — PANTOPRAZOLE SODIUM 40 MG/1
40 TABLET, DELAYED RELEASE ORAL DAILY
Status: CANCELLED | OUTPATIENT
Start: 2025-06-04

## 2025-06-03 RX ORDER — OXYCODONE HYDROCHLORIDE 10 MG/1
10 TABLET ORAL EVERY 6 HOURS PRN
Status: DISCONTINUED | OUTPATIENT
Start: 2025-06-03 | End: 2025-06-04

## 2025-06-03 RX ORDER — HYDROXYZINE HYDROCHLORIDE 25 MG/1
25 TABLET, FILM COATED ORAL 3 TIMES DAILY PRN
Status: DISCONTINUED | OUTPATIENT
Start: 2025-06-03 | End: 2025-07-10 | Stop reason: HOSPADM

## 2025-06-03 RX ORDER — ALBUTEROL SULFATE 2.5 MG/.5ML
0.63 SOLUTION RESPIRATORY (INHALATION) EVERY 6 HOURS PRN
Status: CANCELLED | OUTPATIENT
Start: 2025-06-03

## 2025-06-03 RX ORDER — TALC
6 POWDER (GRAM) TOPICAL NIGHTLY PRN
Status: CANCELLED | OUTPATIENT
Start: 2025-06-03

## 2025-06-03 RX ORDER — SODIUM CHLORIDE FOR INHALATION 3 %
4 VIAL, NEBULIZER (ML) INHALATION 2 TIMES DAILY
Status: DISCONTINUED | OUTPATIENT
Start: 2025-06-03 | End: 2025-07-03

## 2025-06-03 RX ORDER — SODIUM CHLORIDE 0.9 % (FLUSH) 0.9 %
10 SYRINGE (ML) INJECTION EVERY 12 HOURS PRN
Status: DISCONTINUED | OUTPATIENT
Start: 2025-06-03 | End: 2025-07-10 | Stop reason: HOSPADM

## 2025-06-03 RX ORDER — FUROSEMIDE 40 MG/1
40 TABLET ORAL DAILY
Status: CANCELLED | OUTPATIENT
Start: 2025-06-04

## 2025-06-03 RX ORDER — AMITRIPTYLINE HYDROCHLORIDE 25 MG/1
25 TABLET, FILM COATED ORAL NIGHTLY
Status: DISCONTINUED | OUTPATIENT
Start: 2025-06-03 | End: 2025-07-10 | Stop reason: HOSPADM

## 2025-06-03 RX ORDER — GUAIFENESIN 600 MG/1
1200 TABLET, EXTENDED RELEASE ORAL 2 TIMES DAILY
Status: DISCONTINUED | OUTPATIENT
Start: 2025-06-03 | End: 2025-07-07

## 2025-06-03 RX ORDER — TALC
6 POWDER (GRAM) TOPICAL NIGHTLY PRN
Status: DISCONTINUED | OUTPATIENT
Start: 2025-06-03 | End: 2025-07-10 | Stop reason: HOSPADM

## 2025-06-03 RX ORDER — SODIUM CHLORIDE FOR INHALATION 3 %
4 VIAL, NEBULIZER (ML) INHALATION 2 TIMES DAILY
Status: CANCELLED | OUTPATIENT
Start: 2025-06-03

## 2025-06-03 RX ORDER — SODIUM CHLORIDE 0.9 % (FLUSH) 0.9 %
10 SYRINGE (ML) INJECTION EVERY 12 HOURS PRN
Status: CANCELLED | OUTPATIENT
Start: 2025-06-03

## 2025-06-03 RX ORDER — ASPIRIN 81 MG/1
81 TABLET ORAL EVERY MORNING
Status: CANCELLED | OUTPATIENT
Start: 2025-06-04

## 2025-06-03 RX ADMIN — LEVETIRACETAM 500 MG: 500 TABLET, FILM COATED ORAL at 08:06

## 2025-06-03 RX ADMIN — GUAIFENESIN 1200 MG: 600 TABLET, MULTILAYER, EXTENDED RELEASE ORAL at 08:06

## 2025-06-03 RX ADMIN — HYDROXYZINE HYDROCHLORIDE 25 MG: 25 TABLET, FILM COATED ORAL at 08:06

## 2025-06-03 RX ADMIN — SODIUM CHLORIDE SOLN NEBU 3% 4 ML: 3 NEBU SOLN at 07:06

## 2025-06-03 RX ADMIN — LEVALBUTEROL 1.25 MG: 1.25 SOLUTION, CONCENTRATE RESPIRATORY (INHALATION) at 07:06

## 2025-06-03 RX ADMIN — ASPIRIN 81 MG: 81 TABLET, COATED ORAL at 07:06

## 2025-06-03 RX ADMIN — FUROSEMIDE 40 MG: 40 TABLET ORAL at 09:06

## 2025-06-03 RX ADMIN — IPRATROPIUM BROMIDE 0.5 MG: 0.5 SOLUTION RESPIRATORY (INHALATION) at 07:06

## 2025-06-03 RX ADMIN — ENOXAPARIN SODIUM 40 MG: 40 INJECTION SUBCUTANEOUS at 06:06

## 2025-06-03 RX ADMIN — LEVETIRACETAM 500 MG: 500 TABLET, FILM COATED ORAL at 09:06

## 2025-06-03 RX ADMIN — LEVALBUTEROL HYDROCHLORIDE 1.25 MG: 0.63 SOLUTION RESPIRATORY (INHALATION) at 11:06

## 2025-06-03 RX ADMIN — MELATONIN TAB 3 MG 6 MG: 3 TAB at 08:06

## 2025-06-03 RX ADMIN — GUAIFENESIN 1200 MG: 600 TABLET, EXTENDED RELEASE ORAL at 09:06

## 2025-06-03 RX ADMIN — TIOTROPIUM BROMIDE INHALATION SPRAY 2 PUFF: 3.12 SPRAY, METERED RESPIRATORY (INHALATION) at 07:06

## 2025-06-03 RX ADMIN — LEVALBUTEROL 1.25 MG: 1.25 SOLUTION, CONCENTRATE RESPIRATORY (INHALATION) at 04:06

## 2025-06-03 RX ADMIN — ROFLUMILAST 500 MCG: 500 TABLET ORAL at 07:06

## 2025-06-03 RX ADMIN — IPRATROPIUM BROMIDE 0.5 MG: 0.5 SOLUTION RESPIRATORY (INHALATION) at 12:06

## 2025-06-03 RX ADMIN — POLYETHYLENE GLYCOL 3350 17 G: 17 POWDER, FOR SOLUTION ORAL at 09:06

## 2025-06-03 RX ADMIN — ATORVASTATIN CALCIUM 80 MG: 40 TABLET, FILM COATED ORAL at 09:06

## 2025-06-03 RX ADMIN — DILTIAZEM HYDROCHLORIDE 120 MG: 30 TABLET, FILM COATED ORAL at 02:06

## 2025-06-03 RX ADMIN — AMITRIPTYLINE HYDROCHLORIDE 25 MG: 25 TABLET, FILM COATED ORAL at 08:06

## 2025-06-03 RX ADMIN — IPRATROPIUM BROMIDE 0.5 MG: 0.5 SOLUTION RESPIRATORY (INHALATION) at 04:06

## 2025-06-03 RX ADMIN — LEVALBUTEROL 1.25 MG: 1.25 SOLUTION, CONCENTRATE RESPIRATORY (INHALATION) at 12:06

## 2025-06-03 RX ADMIN — Medication 4 ML: at 08:06

## 2025-06-03 RX ADMIN — LEVALBUTEROL HYDROCHLORIDE 1.25 MG: 0.63 SOLUTION RESPIRATORY (INHALATION) at 08:06

## 2025-06-03 RX ADMIN — PANTOPRAZOLE SODIUM 40 MG: 40 TABLET, DELAYED RELEASE ORAL at 09:06

## 2025-06-03 RX ADMIN — DILTIAZEM HYDROCHLORIDE 120 MG: 30 TABLET, FILM COATED ORAL at 09:06

## 2025-06-03 NOTE — NURSING
"Pt arrived from [facility]. AOX4. [place]. P=101, RR18, Sats= 97%. T=97.6 oral. BP= 123/77. Wt =86.8 K. Hgt= 5'9" stated. No distress noted or c/o pain  "

## 2025-06-03 NOTE — RESPIRATORY THERAPY
Pt was received on HFNC 8L. Pt placed on HFNC 6L sast 96% .  Pt refused Bipap and home Cpap usage.  And Nurse notified. Jonas pass along to Night shift RT.

## 2025-06-04 LAB
ABSOLUTE EOSINOPHIL (OHS): 0.01 K/UL
ABSOLUTE MONOCYTE (OHS): 1.47 K/UL (ref 0.3–1)
ABSOLUTE NEUTROPHIL COUNT (OHS): 10.43 K/UL (ref 1.8–7.7)
ALBUMIN SERPL BCP-MCNC: 2.4 G/DL (ref 3.5–5.2)
ALP SERPL-CCNC: 99 UNIT/L (ref 40–150)
ALT SERPL W/O P-5'-P-CCNC: 53 UNIT/L (ref 10–44)
ANION GAP (OHS): 10 MMOL/L (ref 8–16)
AST SERPL-CCNC: 25 UNIT/L (ref 11–45)
BASOPHILS # BLD AUTO: 0.04 K/UL
BASOPHILS NFR BLD AUTO: 0.3 %
BILIRUB SERPL-MCNC: 0.5 MG/DL (ref 0.1–1)
BUN SERPL-MCNC: 39 MG/DL (ref 8–23)
CALCIUM SERPL-MCNC: 8.4 MG/DL (ref 8.7–10.5)
CHLORIDE SERPL-SCNC: 104 MMOL/L (ref 95–110)
CO2 SERPL-SCNC: 28 MMOL/L (ref 23–29)
CREAT SERPL-MCNC: 1 MG/DL (ref 0.5–1.4)
ERYTHROCYTE [DISTWIDTH] IN BLOOD BY AUTOMATED COUNT: 16.3 % (ref 11.5–14.5)
ERYTHROCYTE [DISTWIDTH] IN BLOOD BY AUTOMATED COUNT: NORMAL %
GFR SERPLBLD CREATININE-BSD FMLA CKD-EPI: >60 ML/MIN/1.73/M2
GLUCOSE SERPL-MCNC: 118 MG/DL (ref 70–110)
HCT VFR BLD AUTO: 41.2 % (ref 40–54)
HCT VFR BLD AUTO: NORMAL %
HGB BLD-MCNC: 12.9 GM/DL (ref 14–18)
HGB BLD-MCNC: NORMAL G/DL
IMM GRANULOCYTES # BLD AUTO: 0.51 K/UL (ref 0–0.04)
IMM GRANULOCYTES NFR BLD AUTO: 3.9 % (ref 0–0.5)
LYMPHOCYTES # BLD AUTO: 0.46 K/UL (ref 1–4.8)
MAGNESIUM SERPL-MCNC: 1.8 MG/DL (ref 1.6–2.6)
MCH RBC QN AUTO: 28.1 PG (ref 27–31)
MCH RBC QN AUTO: NORMAL PG
MCHC RBC AUTO-ENTMCNC: 31.3 G/DL (ref 32–36)
MCHC RBC AUTO-ENTMCNC: NORMAL G/DL
MCV RBC AUTO: 90 FL (ref 82–98)
MCV RBC AUTO: NORMAL FL
NUCLEATED RBC (/100WBC) (OHS): 0 /100 WBC
NUCLEATED RBC (/100WBC) (OHS): NORMAL
PHOSPHATE SERPL-MCNC: 2.6 MG/DL (ref 2.7–4.5)
PLATELET # BLD AUTO: 144 K/UL (ref 150–450)
PLATELET # BLD AUTO: NORMAL 10*3/UL
PMV BLD AUTO: 11.2 FL (ref 9.2–12.9)
PMV BLD AUTO: NORMAL FL
POTASSIUM SERPL-SCNC: 4 MMOL/L (ref 3.5–5.1)
PROT SERPL-MCNC: 5.2 GM/DL (ref 6–8.4)
RBC # BLD AUTO: 4.59 M/UL (ref 4.6–6.2)
RBC # BLD AUTO: NORMAL 10*6/UL
RELATIVE EOSINOPHIL (OHS): 0.1 %
RELATIVE LYMPHOCYTE (OHS): 3.6 % (ref 18–48)
RELATIVE MONOCYTE (OHS): 11.4 % (ref 4–15)
RELATIVE NEUTROPHIL (OHS): 80.7 % (ref 38–73)
SODIUM SERPL-SCNC: 142 MMOL/L (ref 136–145)
WBC # BLD AUTO: 12.92 K/UL (ref 3.9–12.7)
WBC # BLD AUTO: NORMAL 10*3/UL

## 2025-06-04 PROCEDURE — 99900031 HC PATIENT EDUCATION (STAT)

## 2025-06-04 PROCEDURE — 97530 THERAPEUTIC ACTIVITIES: CPT

## 2025-06-04 PROCEDURE — 99900035 HC TECH TIME PER 15 MIN (STAT)

## 2025-06-04 PROCEDURE — 83735 ASSAY OF MAGNESIUM: CPT

## 2025-06-04 PROCEDURE — 94640 AIRWAY INHALATION TREATMENT: CPT

## 2025-06-04 PROCEDURE — 25000242 PHARM REV CODE 250 ALT 637 W/ HCPCS

## 2025-06-04 PROCEDURE — 97535 SELF CARE MNGMENT TRAINING: CPT

## 2025-06-04 PROCEDURE — 25000242 PHARM REV CODE 250 ALT 637 W/ HCPCS: Performed by: STUDENT IN AN ORGANIZED HEALTH CARE EDUCATION/TRAINING PROGRAM

## 2025-06-04 PROCEDURE — 97162 PT EVAL MOD COMPLEX 30 MIN: CPT

## 2025-06-04 PROCEDURE — 36415 COLL VENOUS BLD VENIPUNCTURE: CPT

## 2025-06-04 PROCEDURE — 94668 MNPJ CHEST WALL SBSQ: CPT

## 2025-06-04 PROCEDURE — 11000001 HC ACUTE MED/SURG PRIVATE ROOM

## 2025-06-04 PROCEDURE — 94761 N-INVAS EAR/PLS OXIMETRY MLT: CPT

## 2025-06-04 PROCEDURE — 63600175 PHARM REV CODE 636 W HCPCS

## 2025-06-04 PROCEDURE — 27100171 HC OXYGEN HIGH FLOW UP TO 24 HOURS

## 2025-06-04 PROCEDURE — 97165 OT EVAL LOW COMPLEX 30 MIN: CPT

## 2025-06-04 PROCEDURE — 36415 COLL VENOUS BLD VENIPUNCTURE: CPT | Performed by: STUDENT IN AN ORGANIZED HEALTH CARE EDUCATION/TRAINING PROGRAM

## 2025-06-04 PROCEDURE — 84100 ASSAY OF PHOSPHORUS: CPT

## 2025-06-04 PROCEDURE — 25000003 PHARM REV CODE 250

## 2025-06-04 PROCEDURE — 80053 COMPREHEN METABOLIC PANEL: CPT

## 2025-06-04 PROCEDURE — 25000003 PHARM REV CODE 250: Performed by: HOSPITALIST

## 2025-06-04 RX ORDER — FLUTICASONE PROPIONATE 50 MCG
2 SPRAY, SUSPENSION (ML) NASAL DAILY
Status: DISCONTINUED | OUTPATIENT
Start: 2025-06-04 | End: 2025-07-10 | Stop reason: HOSPADM

## 2025-06-04 RX ORDER — HYDROCODONE BITARTRATE AND ACETAMINOPHEN 10; 325 MG/1; MG/1
1 TABLET ORAL EVERY 6 HOURS PRN
Refills: 0 | Status: DISCONTINUED | OUTPATIENT
Start: 2025-06-04 | End: 2025-06-07

## 2025-06-04 RX ORDER — MUPIROCIN 20 MG/G
OINTMENT TOPICAL 2 TIMES DAILY
Status: DISPENSED | OUTPATIENT
Start: 2025-06-04 | End: 2025-06-09

## 2025-06-04 RX ORDER — AMMONIUM LACTATE 12 G/100G
LOTION TOPICAL DAILY
Status: DISCONTINUED | OUTPATIENT
Start: 2025-06-05 | End: 2025-07-10 | Stop reason: HOSPADM

## 2025-06-04 RX ADMIN — AMITRIPTYLINE HYDROCHLORIDE 25 MG: 25 TABLET, FILM COATED ORAL at 09:06

## 2025-06-04 RX ADMIN — FLUTICASONE PROPIONATE 100 MCG: 50 SPRAY, METERED NASAL at 05:06

## 2025-06-04 RX ADMIN — GUAIFENESIN 1200 MG: 600 TABLET, MULTILAYER, EXTENDED RELEASE ORAL at 09:06

## 2025-06-04 RX ADMIN — LEVALBUTEROL HYDROCHLORIDE 1.25 MG: 0.63 SOLUTION RESPIRATORY (INHALATION) at 07:06

## 2025-06-04 RX ADMIN — LEVALBUTEROL HYDROCHLORIDE 1.25 MG: 0.63 SOLUTION RESPIRATORY (INHALATION) at 04:06

## 2025-06-04 RX ADMIN — LEVETIRACETAM 500 MG: 500 TABLET, FILM COATED ORAL at 09:06

## 2025-06-04 RX ADMIN — GUAIFENESIN 1200 MG: 600 TABLET, MULTILAYER, EXTENDED RELEASE ORAL at 08:06

## 2025-06-04 RX ADMIN — LEVALBUTEROL HYDROCHLORIDE 1.25 MG: 0.63 SOLUTION RESPIRATORY (INHALATION) at 08:06

## 2025-06-04 RX ADMIN — ROFLUMILAST 500 MCG: 500 TABLET ORAL at 08:06

## 2025-06-04 RX ADMIN — MUPIROCIN: 20 OINTMENT TOPICAL at 09:06

## 2025-06-04 RX ADMIN — DILTIAZEM HYDROCHLORIDE 120 MG: 30 TABLET, FILM COATED ORAL at 06:06

## 2025-06-04 RX ADMIN — ENOXAPARIN SODIUM 40 MG: 40 INJECTION SUBCUTANEOUS at 05:06

## 2025-06-04 RX ADMIN — Medication 4 ML: at 08:06

## 2025-06-04 RX ADMIN — PANTOPRAZOLE SODIUM 40 MG: 40 TABLET, DELAYED RELEASE ORAL at 08:06

## 2025-06-04 RX ADMIN — TIOTROPIUM BROMIDE INHALATION SPRAY 2 PUFF: 3.12 SPRAY, METERED RESPIRATORY (INHALATION) at 09:06

## 2025-06-04 RX ADMIN — ATORVASTATIN CALCIUM 80 MG: 80 TABLET, FILM COATED ORAL at 08:06

## 2025-06-04 RX ADMIN — LEVETIRACETAM 500 MG: 500 TABLET, FILM COATED ORAL at 08:06

## 2025-06-04 RX ADMIN — DILTIAZEM HYDROCHLORIDE 120 MG: 30 TABLET, FILM COATED ORAL at 09:06

## 2025-06-04 RX ADMIN — LEVALBUTEROL HYDROCHLORIDE 1.25 MG: 0.63 SOLUTION RESPIRATORY (INHALATION) at 12:06

## 2025-06-04 RX ADMIN — Medication 4 ML: at 07:06

## 2025-06-04 RX ADMIN — MELATONIN TAB 3 MG 6 MG: 3 TAB at 09:06

## 2025-06-04 RX ADMIN — ASPIRIN 81 MG: 81 TABLET, COATED ORAL at 07:06

## 2025-06-04 RX ADMIN — FUROSEMIDE 40 MG: 40 TABLET ORAL at 08:06

## 2025-06-04 RX ADMIN — DILTIAZEM HYDROCHLORIDE 120 MG: 30 TABLET, FILM COATED ORAL at 01:06

## 2025-06-04 NOTE — PLAN OF CARE
Problem: Physical Therapy  Goal: Physical Therapy Goal  Description: Goals to be met by: DC     Patient will increase functional independence with mobility by performin. Supine to sit with Humphreys  2. Sit to supine with Humphreys  3. Sit to stand transfer with Supervision with RW  4. Gait  x 150 feet with Contact Guard Assistance using Rolling Walker.     Outcome: Progressing

## 2025-06-04 NOTE — CONSULTS
[facility]  Wound Care Consult  Attending Physician: Bernardino Guthrie MD  Primary Care Physician: Sierra Landry MD  Date of Admit: 6/3/2025      Chief Complaint    Wound to left leg   History of Present Illness:     Per attending   HPI: Jordin Allen Jr. 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 Patient has not felt this chest pain since the episode. Currently no chest pain. Does endorse SOB and cough. Reports he is not coughing up anything because he feels like it is stuck in his chest. Denies fever, chills, chest pain, palpitations,  abdominal pain, n/v/d, dysuria, headaches, or any other symptoms at this time.      In ED: AF, VSS on 6-8L HFNC. CBC with leukocytosis. Hgb 11.9. CMP notable for Na 135, mild elevation in ALT. Phos 2.4. BNP 62. HS trop 12. LA 0.9. covid/flu negative. ABG with pH 7.499, pCO2 56.3, pO2 79, HCO3 43.8. Blood cultures pending. CTA chest with no evidence of acute PE, mild ill-defined airspace opacities in the dependent portions of the right upper and middle lobes when compared to 05/22/2025, nonspecific.  This could be due to atelectasis, aspiration and/or pneumonia 5 mm left upper lobe nodule, unchanged from 05/22/2025 but new when compared to 03/05/2025.  A follow-up chest CT in 1 year could be considered if the patient is at increased risk of developing lung cancer, such as from a smoking history. S/p duoneb, dexamethasone 10mg inj, vanc and zosyn in ED. Admitted to  for further evaluation.     6/4/2025 Patient seen for wound to left keg      Past medical history:     Past Medical History:   Diagnosis Date    Benign neoplasm of colon 10/01/2013     Bronchitis chronic     CAD (coronary artery disease) 10/31/2013    COPD (chronic obstructive pulmonary disease)     Emphysema of lung     Encounter for preventive health examination 3/21/2018    GERD (gastroesophageal reflux disease)     Hx of colonic polyps     Hypertension     NAFLD (nonalcoholic fatty liver disease) 03/27/2017    SHOLA on CPAP     RLS (restless legs syndrome)     Screen for colon cancer 08/30/2013     Past medical history was reviewed and was otherwise negative except as above.    Past surgical history:     Past Surgical History:   Procedure Laterality Date    bilateral foot surgery  1993    CARDIAC CATHETERIZATION  2013    x1    CATARACT EXTRACTION, BILATERAL      COLON SURGERY  2013    Ace Mott MD    COLONOSCOPY N/A 3/21/2018    Procedure: COLONOSCOPY;  Surgeon: Terry Mott MD;  Location: [facility] ENDO (4TH FLR);  Service: Endoscopy;  Laterality: N/A;    COLONOSCOPY N/A 4/12/2019    Procedure: COLONOSCOPY;  Surgeon: John Mantilla MD;  Location: [facility] ENDO (4TH FLR);  Service: Endoscopy;  Laterality: N/A;    COLONOSCOPY N/A 5/31/2022    Procedure: COLONOSCOPY;  Surgeon: MEDINA Farris MD;  Location: [facility] (4TH FLR);  Service: Endoscopy;  Laterality: N/A;  fully vacc-inst portal-tb    ENDOBRONCHIAL ULTRASOUND Bilateral 4/30/2024    Procedure: ENDOBRONCHIAL ULTRASOUND (EBUS);  Surgeon: Naveed Aguero MD;  Location: [facility] OR;  Service: Pulmonary;  Laterality: Bilateral;    scrotal abscess removal       Past surgical history was reviewed and was noncontributory except as above.    Allergies:     Review of patient's allergies indicates:   Allergen Reactions    Brilinta [ticagrelor] Itching    Lisinopril      Other reaction(s): cough    Metoprolol succinate Rash     Allergies were reviewed and were negative except as above.    Home Medications:     Prior to Admission medications    Medication Sig Start Date End Date Taking? Authorizing Provider   acetaminophen (TYLENOL) 500 MG tablet  Take 500 mg by mouth 2 (two) times daily as needed for Pain.    Provider, Historical   albuterol (ACCUNEB) 0.63 mg/3 mL Nebu Inhale 3 mLs (0.63 mg total) by nebulization every 6 (six) hours as needed (if symptoms failed to improve with inhaler). Rescue 12/10/24   Bienvenido Ward MD   albuterol (PROVENTIL/VENTOLIN HFA) 90 mcg/actuation inhaler Inhale 2 puffs into the lungs every 4 (four) hours as needed for Wheezing. 12/10/24   Bienvenido Ward MD   amitriptyline (ELAVIL) 25 MG tablet Take 1 tablet (25 mg total) by mouth once daily.  Patient taking differently: Take 25 mg by mouth every evening. 9/3/24   Sierra Landry MD   aspirin (ECOTRIN) 81 MG EC tablet Take 81 mg by mouth every morning.    Provider, Historical   atorvastatin (LIPITOR) 80 MG tablet Take 1 tablet (80 mg total) by mouth in the morning. 4/21/25   Sierra Landry MD   BETA-CAROTENE,A, W-C & E/MIN (OCUVITE ORAL) Take 1 capsule by mouth once daily.     Provider, Historical   budesonide-glycopyr-formoterol (BREZTRI AEROSPHERE) 160-9-4.8 mcg/actuation HFAA Inhale 2 puffs into the lungs 2 (two) times a day. 12/10/24   Bienvenido Ward MD   dexAMETHasone (DECADRON) 4 MG Tab Take 1 tablet (4 mg total) by mouth 2 (two) times daily.  Patient not taking: Reported on 5/16/2025 3/26/25   Bienvenido Henson MD   echinacea 400 mg Cap Take 1 capsule by mouth once daily.     Provider, Historical   fluticasone propionate (FLONASE) 50 mcg/actuation nasal spray Spray 2 sprays (100 mcg total) in Each Nostril once daily. 10/23/24   Caren Shah MD   furosemide (LASIX) 20 MG tablet Take 1 tablet (20 mg total) by mouth once daily. 4/15/25 4/15/26  Bienvenido Ward MD   ibuprofen (ADVIL,MOTRIN) 200 MG tablet Take 200 mg by mouth every 8 (eight) hours as needed for Pain. 2/21/25   Provider, Historical   latanoprost 0.005 % ophthalmic solution Instill 1 drop into both eyes every night at bedtime 1/30/25      levETIRAcetam (KEPPRA) 500 MG Tab Take  "1 tablet (500 mg total) by mouth 2 (two) times daily. 3/24/25 3/24/26  Janene Nelson PA-C   losartan (COZAAR) 100 MG tablet Take 1 tablet (100 mg total) by mouth once daily. 12/19/24   Prince Ba MD   nitroGLYCERIN (NITROSTAT) 0.4 MG SL tablet Place 1 tablet (0.4 mg total) under the tongue every 5 (five) minutes as needed. 5/6/24   Prince Ba MD   omeprazole (PRILOSEC) 20 MG capsule Take 20 mg by mouth once daily.    Provider, Historical   polyethylene glycol (GLYCOLAX) 17 gram/dose powder Take 17 grams by mouth every day for constipation prevention 5/20/25      roflumilast (DALIRESP) 500 mcg Tab Take 500 mcg by mouth every morning. 2/22/25   Provider, Historical   sennosides (SENNA ORAL) Take 1 tablet by mouth daily as needed (Constipation).    Provider, Historical   traZODone (DESYREL) 50 MG tablet Take 1 tablet (50 mg total) by mouth every evening. 1/14/25   Lou Muro NP       Family History:     Family History   Problem Relation Name Age of Onset    Heart disease Mother      Heart attack Mother      Hypertension Mother      No Known Problems Sister      No Known Problems Daughter 2     Asthma Neg Hx      Emphysema Neg Hx      Prostate cancer Neg Hx      Cirrhosis Neg Hx       Family history was reviewed and was otherwise negative except as above.    Social History:   Social History[1]  Social history was reviewed and was otherwise negative except as above    Review of Systems   A 10 point review of systems was conducted and was negative except as described in the HPI.  Patient denies Chest Pain.  Patient denies shortness of breath.  Patient denies fevers.  Patient denies chills.    Physical Examination:   Triage: BP: 123/77  Pulse: 101  Temp: 97.6 °F (36.4 °C)  Resp: 18  Height: 5' 9" (175.3 cm)  Weight: 86.8 kg (191 lb 5.8 oz) (witness by SALO Escalante and C.C. RT.)  BMI (Calculated): 28.2  SpO2: 97 %  Exam: /80 (BP Location: Left arm, Patient Position: Lying)   Pulse (!) 117  " " Temp 98.3 °F (36.8 °C) (Axillary)   Resp 18   Ht 5' 9.02" (1.753 m)   Wt 96 kg (211 lb 10.3 oz)   SpO2 99%   BMI 31.24 kg/m²     Vitals  BP  Min: 115/64  Max: 161/74  Temp  Av.9 °F (36.6 °C)  Min: 97.4 °F (36.3 °C)  Max: 98.3 °F (36.8 °C)  Pulse  Av.4  Min: 70  Max: 117  Resp  Av.3  Min: 18  Max: 39  SpO2  Av.8 %  Min: 93 %  Max: 100 %  Height  Av' 9.02" (175.3 cm)  Min: 5' 9.02" (175.3 cm)  Max: 5' 9.02" (175.3 cm)  Weight  Av kg (211 lb 10.3 oz)  Min: 96 kg (211 lb 10.3 oz)  Max: 96 kg (211 lb 10.3 oz)    General Pt alert and oriented, not in apparent distress, good nutrition  Eyes: No conjunctival erythema; normal appearing lids  HEENT: Normocephalic atraumatic, mucous membranes moist  Cardiovascular: No edema  Respiratory: Non-labored, no accessory muscle use, no wheezing  Abdomen: Soft, non tender, non distended, no rebound  Musculoskeletal: no clubbing or cyanosis of digits  Neuro: Grossly intact, weakness upper/lower extremities  Psych: Good mood, full affect, good insight  Skin:              25 1030        Wound 25 0233 Moisture associated dermatitis Left dorsal Foot   Date First Assessed/Time First Assessed: 25   Present on Original Admission: Yes  Primary Wound Type: Moisture associated dermatitis  Side: Left  Orientation: dorsal  Location: Foot  Is this injury device related?: No   Wound Image    Dressing Appearance Open to air   Drainage Amount None   Drainage Characteristics/Odor No odor   Appearance Dry   Red (%), Wound Tissue Color 100 %   Periwound Area [place]   Care    (nursing to apply Lachydrin)        Wound 25 Moisture associated dermatitis Left lateral Foot   Date First Assessed/Time First Assessed: 25   Present on Original Admission: Yes  Primary Wound Type: Moisture associated dermatitis  Side: Left  Orientation: lateral  Location: Foot   Wound Image                           (took photo of all angles of foot) "   Dressing Appearance Open to air   Drainage Amount None   Drainage Characteristics/Odor No odor   Appearance Dry   Tissue loss description Not applicable   Periwound Area Dry   Wound Edges Approximated   Care    (nursing to apply Lachydrin, by nursing)        Wound 05/19/25 0233 Moisture associated dermatitis Left anterior Foot   Date First Assessed/Time First Assessed: 05/19/25 0233   Primary Wound Type: Moisture associated dermatitis  Side: Left  Orientation: anterior  Location: Foot   Wound Image    Dressing Appearance Open to air   Drainage Amount None   Drainage Characteristics/Odor No odor   Appearance Dry   Tissue loss description Not applicable   Red (%), Wound Tissue Color 100 %   Periwound Area Pink   Wound Edges Undefined   Care    (nursing to apply Lachydrin)   Periwound Care Dry periwound area maintained        Wound 05/19/25 0231 Pressure Injury Right anterior Ankle   Date First Assessed/Time First Assessed: 05/19/25 0231   Present on Original Admission: Yes  Primary Wound Type: Pressure Injury  Side: Right  Orientation: anterior  Location: Ankle   Wound Image    Pressure Injury Stage 1   Dressing Appearance Moist drainage   Drainage Amount Scant   Drainage Characteristics/Odor No odor   Appearance Pink   Tissue loss description Partial thickness   Red (%), Wound Tissue Color 100 %   Periwound Area Intact   Wound Length (cm) 0.5 cm   Wound Width (cm) 0.5 cm   Wound Depth (cm) 0.1 cm   Wound Volume (cm^3) 0.013 cm^3   Wound Surface Area (cm^2) 0.2 cm^2   Care Cleansed with:   Dressing    (apply mepi border)   [REMOVED]      Wound 05/22/25 1933 Skin Tear Right distal Arm   Final Assessment Date/Final Assessment Time: 06/04/25 1030  Date First Assessed/Time First Assessed: 05/22/25 1933   Present on Original Admission: No  Primary Wound Type: Skin Tear  Side: Right  Orientation: distal  Location: Arm  Wound Outcome: Healed   Wound Image    Dressing Appearance Intact   Drainage Amount None   Appearance  Dry   Red (%), Wound Tissue Color 100 %   Periwound Area Dry   Wound Length (cm) 0 cm   Wound Width (cm) 0 cm   Wound Depth (cm) 0 cm   Wound Volume (cm^3) 0 cm^3   Wound Surface Area (cm^2) 0 cm^2   Care Cleansed with:;Wound cleanser   Periwound Care Dry periwound area maintained   [REMOVED]      Wound 05/22/25 1933 Pressure Injury Nose   Final Assessment Date/Final Assessment Time: 06/04/25 1030  Date First Assessed/Time First Assessed: 05/22/25 1933   Present on Original Admission: Yes  Primary Wound Type: Pressure Injury  Location: Nose  Is this injury device related?: (c) Yes  Woun...   Wound Image    Dressing Appearance Open to air   Drainage Amount None   Drainage Characteristics/Odor No odor   Appearance Dry   Red (%), Wound Tissue Color 100 %   Periwound Area Dry   Wound Length (cm) 0 cm   Wound Width (cm) 0 cm   Wound Depth (cm) 0 cm   Wound Volume (cm^3) 0 cm^3   Wound Surface Area (cm^2) 0 cm^2   Care Cleansed with:;Wound cleanser   Dressing    (open to air, dry)        Wound 06/04/25 1030 Moisture associated dermatitis Right lateral Abdomen   Date First Assessed/Time First Assessed: 06/04/25 1030   Present on Original Admission: Yes  Primary Wound Type: Moisture associated dermatitis  Side: Right  Orientation: lateral  Location: Abdomen   Wound Image    Dressing Appearance No dressing   Drainage Amount None   Red (%), Wound Tissue Color 100 %   Periwound Area Moist   Care Wound cleanser   Dressing    (applied Triad paste)        Wound 06/04/25 1030 Moisture associated dermatitis Left lateral Abdomen   Date First Assessed/Time First Assessed: 06/04/25 1030   Present on Original Admission: Yes  Primary Wound Type: Moisture associated dermatitis  Side: Left  Orientation: lateral  Location: Abdomen   Wound Image    Dressing Appearance Open to air   Drainage Amount None   Appearance Moist   Red (%), Wound Tissue Color 100 %   Periwound Area Moist   Care Wound cleanser   Dressing    (applied Triad paste)         Wound 06/04/25 1030 Moisture associated dermatitis Right Groin   Date First Assessed/Time First Assessed: 06/04/25 1030   Present on Original Admission: Yes  Primary Wound Type: Moisture associated dermatitis  Side: Right  Location: Groin  Is this injury device related?: No   Wound Image    Dressing Appearance Open to air   Drainage Amount None   Red (%), Wound Tissue Color 100 %   Periwound Area Moist   Care Cleansed with:;Wound cleanser   Dressing    (applied Triad paste)        Wound 06/04/25 1030 Moisture associated dermatitis Left Groin   Date First Assessed/Time First Assessed: 06/04/25 1030   Present on Original Admission: Yes  Primary Wound Type: Moisture associated dermatitis  Side: Left  Location: Groin   Wound Image    Drainage Amount None   Red (%), Wound Tissue Color 100 %   Periwound Area Moist   Care Cleansed with:;Wound cleanser   Dressing    (applied Triad paste)        Wound 06/04/25 1030 Moisture associated dermatitis Sacral spine   Date First Assessed/Time First Assessed: 06/04/25 1030   Present on Original Admission: Yes  Primary Wound Type: (c) Moisture associated dermatitis  Location: (c) Sacral spine   Wound Image    Drainage Amount None   Drainage Characteristics/Odor No odor   Appearance Moist   Periwound Area Moist   Care Wound cleanser   Dressing    (applied Triad paste)             Laboratory:  Most Recent Data:  CBC:   Lab Results   Component Value Date    WBC 12.92 (H) 06/04/2025    HGB 12.9 (L) 06/04/2025    HCT 41.2 06/04/2025     (L) 06/04/2025    MCV 90 06/04/2025    RDW 16.3 (H) 06/04/2025     BMP:   Lab Results   Component Value Date     06/04/2025    K 4.0 06/04/2025     06/04/2025    CO2 28 06/04/2025    BUN 39 (H) 06/04/2025    CREATININE 1.0 06/04/2025     (H) 06/04/2025    CALCIUM 8.4 (L) 06/04/2025    MG 1.8 06/04/2025    PHOS 2.6 (L) 06/04/2025     LFTs:   Lab Results   Component Value Date    PROT 5.2 (L) 06/04/2025    ALBUMIN 2.4 (L)  "06/04/2025    BILITOT 0.5 06/04/2025    AST 25 06/04/2025    ALKPHOS 99 06/04/2025    ALT 53 (H) 06/04/2025     Coags:   Lab Results   Component Value Date    INR 1.0 02/07/2025     FLP:   Lab Results   Component Value Date    CHOL 101 (L) 02/07/2025    HDL 35 (L) 02/07/2025    LDLCALC 53.2 (L) 02/07/2025    TRIG 64 02/07/2025    CHOLHDL 34.7 02/07/2025     DM:   Lab Results   Component Value Date    HGBA1C 5.7 (H) 03/06/2024    HGBA1C 5.6 10/12/2023    HGBA1C 5.8 05/30/2013    GLUF 103 05/19/2014    LDLCALC 53.2 (L) 02/07/2025    CREATININE 1.0 06/04/2025     Thyroid:   Lab Results   Component Value Date    TSH 0.261 (L) 06/01/2025    FREET4 0.99 06/01/2025    O4LJKQP 9.1 03/06/2024    L9BAWGE 86 03/07/2017     Anemia:   Lab Results   Component Value Date    IRON 28 (L) 08/29/2024    TIBC 266 08/29/2024    FERRITIN 2,080 (H) 08/29/2024    TMXMFUXL28 474 08/29/2024    FOLATE 4.5 08/29/2024     Cardiac:   Lab Results   Component Value Date    TROPONINI <0.006 07/23/2024    BNP 63 05/22/2025     Urinalysis:   Lab Results   Component Value Date    COLORU Yellow 05/25/2025    SPECGRAV 1.015 05/25/2025    NITRITE Negative 05/25/2025    KETONESU Trace (A) 02/07/2025    UROBILINOGEN Negative 05/25/2025    WBCUA 4 05/25/2025       Trended Lab Data:  Recent Labs   Lab 06/02/25  0438 06/03/25  0213 06/04/25  0344 06/04/25  0851   WBC 11.04 12.52  --  12.92*   HGB 13.1* 12.5*  --  12.9*    175  --  144*   MCV 91 89  --  90   RDW 15.9* 15.9*  --  16.3*    142 142  --    K 4.1 4.3 4.0  --     103 104  --    CO2 29 29 28  --    BUN 42* 40* 39*  --    * 136* 118*  --    CALCIUM 8.6* 8.6* 8.4*  --    PROT 5.3* 5.3* 5.2*  --    ALBUMIN 2.5* 2.5* 2.4*  --    BILITOT 0.5 0.6 0.5  --    AST 17 19 25  --    ALKPHOS 98 92 99  --    ALT 58* 54* 53*  --        Trended Cardiac Data:  No results for input(s): "TROPONINI", "CKTOTAL", "CKMB", "BNP" in the last 168 hours.    Micro Results  No components found for: " ""CBLOOD"  No components found for: "CURINE"  No components found for: "CRESPWSM"    Radiology:  X-Ray Chest AP Portable  Result Date: 6/1/2025  EXAMINATION: XR CHEST AP PORTABLE CLINICAL HISTORY: palpitations; TECHNIQUE: Single frontal view of the chest was performed. COMPARISON: 05/26/2025. FINDINGS: The lungs are well expanded and clear. No focal opacities are seen. The pleural spaces are clear. The cardiac silhouette is unremarkable. The visualized osseous structures are unremarkable.     No acute abnormality. Electronically signed by: Popeye Mckeon Date:    06/01/2025 Time:    22:33    X-Ray Chest AP Portable  Result Date: 5/26/2025  EXAMINATION: XR CHEST AP PORTABLE CLINICAL HISTORY: sob; FINDINGS: Chest one view AP portable. Heart size is normal.  Lungs are clear.  The bones are noncontributory.     No acute process seen. Electronically signed by: Alexander Blankenship MD Date:    05/26/2025 Time:    09:16    X-Ray Chest AP Portable  Result Date: 5/25/2025  EXAMINATION: XR CHEST AP PORTABLE CLINICAL HISTORY: chf; TECHNIQUE: Single frontal view of the chest was performed. COMPARISON: 05/22/2025 FINDINGS: The cardiomediastinal silhouette is stable in configuration noting calcification of the aorta..  There is no pleural effusion.  The trachea is midline.  The lungs are symmetrically expanded bilaterally with coarse interstitial attenuation, similar to the previous examination.  There is increased parenchymal attenuation projected over the medial aspect of the right lower lung zone, may reflect atelectasis, developing consolidation not excluded..  There is no pneumothorax.  The osseous structures are unchanged..     As above Electronically signed by: Artur Velazquez MD Date:    05/25/2025 Time:    19:17    Echo  Result Date: 5/23/2025    Tachycardia was present during the study   Erall the study quality was technically difficult. valves not well visualized.   Left Ventricle: The left ventricle is normal in size. Normal " wall thickness. Normal wall motion. There is normal systolic function with a visually estimated ejection fraction of 65 - 70%. There is normal diastolic function. Normal left ventricular filling pressure.   Right Ventricle: The right ventricle is normal in size Wall thickness is normal. Systolic function is normal.   Left Atrium: Left atrium was not well visualized.   Right Atrium: Not well visualized due to poor acoustic window.   Aortic Valve: Not well visualized due to poor acoustic window.   Mitral Valve: Not well visualized due to poor acoustic window.   Tricuspid Valve: Not well visualized due to poor acoustic window.   Pulmonic Valve: Not well visualized due to poor acoustic window.   Aorta: The aortic root is normal in size measuring 2.79 cm. The ascending aorta is normal in size measuring 2.7 cm.   IVC/SVC: Normal venous pressure at 3 mmHg.   Pericardium: There is no pericardial effusion.     US Lower Extremity Veins Bilateral  Result Date: 5/22/2025  EXAMINATION: US LOWER EXTREMITY VEINS BILATERAL CLINICAL HISTORY: dvt; TECHNIQUE: Duplex and color flow Doppler and dynamic compression was performed of the bilateral lower extremity veins was performed. COMPARISON: 05/15/2025. FINDINGS: Right thigh veins: The common femoral, femoral, popliteal, upper greater saphenous, and deep femoral veins are patent and free of thrombus. The veins are normally compressible and have normal phasic flow and augmentation response. Right calf veins: The visualized calf veins are patent. Left thigh veins: The common femoral, femoral, popliteal, upper greater saphenous, and deep femoral veins are patent and free of thrombus. The veins are normally compressible and have normal phasic flow and augmentation response. Left calf veins: The visualized calf veins are patent. Miscellaneous: None     No evidence of deep venous thrombosis in either lower extremity. Electronically signed by: Vidal Arroyo MD Date:    05/22/2025  Time:    18:06    CTA Chest Non-Coronary (PE Studies)  Result Date: 5/22/2025  EXAMINATION: CTA CHEST NON CORONARY (PE STUDIES) CLINICAL HISTORY: Pulmonary embolism (PE) suspected, unknown D-dimer; TECHNIQUE: Low dose axial images, sagittal and coronal reformations were obtained from the thoracic inlet to the lung bases following the IV administration of 100 mL of Omnipaque 350.  Contrast timing was optimized to evaluate the pulmonary arteries.  MIP images were performed. COMPARISON: Radiograph 05/22/2025, CT abdomen and pelvis 05/19/2025.  CT chest 03/05/2025, CTA chest 03/19/2024, CTA chest 12/28/2024 FINDINGS: The structures at the base of the neck are unremarkable.  No significant mediastinal lymphadenopathy.  The heart is not enlarged.  The thoracic aorta tapers normally noting aberrant right subclavian.  There is atherosclerotic calcification of the aorta and in the distribution of the coronary arteries.  The visualized portions of the spleen and adrenal glands are unremarkable.  There is fatty atrophy of the pancreas.  The gallbladder is distended without wall thickening.  The liver is unremarkable.  There is bilateral perinephric fat stranding, correlation with urinalysis recommended. Motion artifact limits evaluation of the airways and pulmonary parenchyma.  There are aerated secretions layering posteriorly within the upper trachea, placing patient at risk for aspiration.  The airways are otherwise patent.  There is bilateral pulmonary emphysematous change.  There is scattered interlobular septal thickening.  There is a nodular focus within the anterior aspect of the left upper lobe measuring in conglomerate 5-6 mm not seen on the previous exam.  There is bilateral basilar dependent atelectasis.  No large focal consolidation.  No pneumothorax. There are post treatment changes within the left hilar region, less conspicuous on current exam. Bolus timing is adequate for evaluation of pulmonary thromboembolism.   Allowing for artifact from motion, no convincing pulmonary arterial filling defects to the level of the segmental branches bilaterally to suggest pulmonary thromboembolism. There is osteopenia.  There are degenerative changes of the spine.  No significant axillary lymphadenopathy.  There is height loss involving the superior endplate of L1 new since examination 03/05/2025, correlation with any focal tenderness recommended.  There is a sclerotic focus within the posterior elements of T9 stable.     1. Allowing for motion artifact, no convincing pulmonary thromboembolism.  Correlation of these findings with D-dimer and/or lower extremity ultrasound as warranted. 2. Pulmonary emphysematous change noting superimposed mild edema.  There is a nodular focus within the anterior aspect of the left upper lobe, not convincingly seen on the previous examination.  Developing infectious or inflammatory process is a consideration, continued follow-up is recommended to exclude discrete nodule in the setting of malignancy. 3. Aerated secretion within the proximal trachea, places patient at risk for aspiration. 4. Endplate compression deformity of L1, new since examination 03/05/2025 however stable from examination 05/19/2025. 5. Please see above for several additional findings. Electronically signed by: Artur Velazquez MD Date:    05/22/2025 Time:    17:21    X-Ray Foot Complete Left  Result Date: 5/22/2025  EXAMINATION: XR FOOT COMPLETE 3 VIEW LEFT CLINICAL HISTORY: .  Cellulitis, unspecified TECHNIQUE: AP, lateral and oblique views of the left foot were performed. COMPARISON: None FINDINGS: Three views left foot. There is osteopenia.  There are degenerative changes of the foot.  There is remote fracture of the fifth metatarsal.  Bandaging material overlies the lateral aspect of the foot.  No radiopaque foreign body.  There are degenerative changes of the calcaneus.  There is edema primarily about the dorsal aspect of the foot.      1. No convincing acute displaced fracture or dislocation of the foot noting diffuse edema particularly along the dorsal aspect.  Correlation is advised. Electronically signed by: Artur Velazquez MD Date:    05/22/2025 Time:    14:46    X-Ray Chest AP Portable  Result Date: 5/22/2025  EXAMINATION: XR CHEST AP PORTABLE CLINICAL HISTORY: CHF; TECHNIQUE: Single frontal view of the chest was performed. COMPARISON: 02/07/2025 FINDINGS: The cardiomediastinal silhouette is not enlarged noting calcification of the aorta..  There is no pleural effusion.  The trachea is midline.  The lungs are symmetrically expanded bilaterally with coarse interstitial attenuation bilaterally noting scattered regions of bandlike atelectasis, similar to the previous examination.  No large focal consolidation seen.  There is no pneumothorax.  The osseous structures are remarkable for degenerative change..     1. Chronic appearing interstitial findings, superimposed edema remains a consideration.  No large focal consolidation. Electronically signed by: Artur Velazquez MD Date:    05/22/2025 Time:    13:15    CT Abdomen Pelvis With IV Contrast NO Oral Contrast  Result Date: 5/19/2025  EXAMINATION: CT ABDOMEN PELVIS WITH IV CONTRAST CLINICAL HISTORY: Epigastric pain;SBO rule out; TECHNIQUE: Low dose axial images, sagittal and coronal reformations were obtained from the lung bases to the pubic symphysis following the IV administration of 75 mL of Omnipaque 350 COMPARISON: CT of the chest, abdomen, and pelvis 03/05/2020 FINDINGS: Lower chest: Partially imaged cardiac valvular calcifications.  Atelectasis at the lung bases.  Patchy airspace consolidation in the medial basilar right lower lobe. Liver: Normal contour.  Subcentimeter hypodense lesion right hepatic lobe too small to definitely characterize. Gallbladder and bile ducts: Somewhat hydropic appearance of the gallbladder. No definite radiodense gallstones or pericholecystic inflammation.   No concerning biliary ductal dilatation. Pancreas: Unremarkable. Spleen: Normal contour.  At least 2 accessory splenule suggested. Adrenals: Unremarkable. Kidneys: Unchanged appearance of the kidneys compared to 03/05/2025 CT.  Unchanged presumed cysts in the left kidney.  Additional subcentimeter hypodense lesions are too small to definitely characterize.  Nonspecific bilateral perinephric stranding again seen. Lymph nodes: No abdominal or pelvic lymphadenopathy. Bowel and mesentery: Postoperative changes of the bowel again noted in keeping with partial colectomy.  Colonic diverticulosis.  Relatively diffuse fluid distended colonic loops.  Short segment focal narrowing of the sigmoid colon (axial series 2, image 110).  Small bowel normal caliber.Appendix not seen and may be surgically absent. Abdominal aorta: Nonaneurysmal.  Moderate to heavy atherosclerosis. Inferior vena cava: Unremarkable. Free fluid or free air: None. Pelvis: Unremarkable. Body wall: Small fat containing umbilical and infraumbilical ventral hernias. Bones: No acute change.  Suspect osseous demineralization.  No change in configuration of recent burst type fracture of the L1 vertebra compared to dedicated CT of the lumbar spine performed 05/14/2025.     1. Relatively diffusely dilated fluid-filled colonic loops, nonspecific.  Findings could reflect a nonspecific diarrheal illness, possibly infectious or noninfectious inflammatory in nature.  Noting this, there is an apparent short segment focal narrowing of the sigmoid colon, which could reflect peristalsis, however stricture or neoplasm difficult to exclude in this region.  Attention on CT follow-up recommended.  Additionally, consider colonoscopy for further characterization, if not recently performed. 2. Patchy airspace consolidation in the basilar right lower lobe, possibly infectious or noninfectious inflammatory in etiology.  Correlation advised. 3. Additional details of chronic and  incidental findings, as provided in the body of the report. Electronically signed by: Bolivar Gonsales Date:    05/19/2025 Time:    11:31    US Lower Extremity Veins Bilateral  Result Date: 5/15/2025  EXAMINATION: US LOWER EXTREMITY VEINS BILATERAL CLINICAL HISTORY: r/o DVT; TECHNIQUE: Duplex and color flow Doppler and dynamic compression was performed of the bilateral lower extremity veins was performed. COMPARISON: None FINDINGS: Right thigh veins: The common femoral, femoral, popliteal, upper greater saphenous, and deep femoral veins are patent and free of thrombus. The veins are normally compressible and have normal phasic flow and augmentation response. Right calf veins: The visualized calf veins are patent. Left thigh veins: The common femoral, femoral, popliteal, upper greater saphenous, and deep femoral veins are patent and free of thrombus. The veins are normally compressible and have normal phasic flow and augmentation response. Left calf veins: The visualized calf veins are patent. Miscellaneous: None     No evidence of deep venous thrombosis in either lower extremity. Electronically signed by: Bolivar Resendiz MD Date:    05/15/2025 Time:    14:36    Echo  Result Date: 5/15/2025    Left Ventricle: The left ventricle is normal in size. Ventricular mass is normal. Normal wall thickness. Normal wall motion. There is low normal systolic function with a visually estimated ejection fraction of 50 - 55%. Ejection fraction is approximately 55%. There is normal diastolic function.   Right Ventricle: The right ventricle is normal in size Wall thickness is normal. Systolic function is normal.   IVC/SVC: Normal venous pressure at 3 mmHg.     MRI Lumbar Spine W WO Cont  Result Date: 5/14/2025  EXAMINATION: MRI LUMBAR SPINE W WO CONTRAST CLINICAL HISTORY: Compression fracture, lumbar; TECHNIQUE: Multiplanar, multisequence MR images were acquired from the thoracolumbar junction to the sacrum without and with the  administration of contrast.  The patient received 10 cc of Gadavist IV. COMPARISON: CT L-spine same-date. FINDINGS: Lumbar spine alignment is within normal limits. Acute L1 superior endplate compression fracture, with mild anterior height loss.  2 mm osseous retropulsion.  No resultant spinal canal stenosis or neural foraminal narrowing. No marrow signal abnormality suspicious for an infiltrative process.  L2 and L5 vertebral body hemangiomas.  There is a some mild increased edema in the L2 vertebral body with no fracture identified. The conus is normal in appearance, and terminates at the L2 level. Mild disc space narrowing at L4-L5.  Multilevel disc desiccation.There are findings of lumbar spondylosis, as below. T12-L1: No spinal canal stenosis or neural foraminal narrowing. L1-L2: No spinal canal stenosis or neural foraminal narrowing. L2-L3: Mild disc bulge.  Mild facet arthropathy and ligamentum flavum thickening.  Mild bilateral foraminal narrowing.  Mild lateral recess stenosis. L3-L4: Mild disc bulge.  Mild facet arthropathy and ligamentum flavum thickening.  Mild bilateral foraminal narrowing.  Mild lateral recess stenosis. L4-L5: Disc bulge with moderate facet arthropathy and ligamentum flavum thickening.  Mild bilateral foraminal narrowing.  Mild spinal canal stenosis. L5-S1: Advanced facet arthropathy.  No spinal canal stenosis or neural foraminal narrowing. The adjacent soft tissue structures demonstrate bilateral renal cysts.  Colonic diverticulosis. Following the administration of intravenous contrast, there is enhancement of the L1 endplate at the level of the fracture site. There is enhancement within the anterior prevertebral soft tissues extending from T12-L1. There is enhancement of the L2 intraosseous hemangioma.     1.  Subacute compression fracture of the L1 superior endplate.  Mild anterior height loss and 2 mm osseous retropulsion.  No resultant spinal canal stenosis or neural foraminal  narrowing at that level. 2.  Nonspecific enhancement in the anterior prevertebral soft tissues from T12-L1.  This may be reactive in nature. 3.  Intraosseous hemangioma in the L2 vertebral body with associated minimal increased edema.  Some component of bony contusion at this level with present. 4.  Mild lumbar spondylosis as described above. Electronically signed by resident: Franki Funes Date:    05/14/2025 Time:    18:26 Electronically signed by: Vidal Arroyo MD Date:    05/14/2025 Time:    18:51    CT Lumbar Spine Without Contrast  Result Date: 5/14/2025  EXAMINATION: CT LUMBAR SPINE WITHOUT CONTRAST CLINICAL HISTORY: Low back pain, increased fracture risk TECHNIQUE: Low dose axial images, sagittal and coronal reformations were performed though the lumbar spine.  Contrast was not administered. COMPARISON: CT chest abdomen pelvis 03/05/2025 FINDINGS: ALIGNMENT: Minimal lumbar levocurvature.  Sagittal alignment is maintained. BONE: Diffuse bony demineralization.  New L1 burst compression deformity when compared to CT 03/05/2025 with mild superior endplate height loss and approximately 3 mm of retropulsion.  No definite fracture extension into the posterior elements.  L2 and L5 vertebral hemangiomas. JOINT: Mild disc space narrowing at L4-L5.  Multilevel facet arthropathy.  Mild degenerative change of the sacroiliac joints, similar to prior. SPINAL CANAL: The conus medullaris and cauda equina nerve roots are difficult to visualize.  No mass or collection. PARASPINAL SOFT TISSUES: Left renal cyst.  Extensive aortoiliac calcific atherosclerosis.  Colonic diverticulosis. SIGNIFICANT FINDINGS BY LEVEL: T12-L1: Facet arthropathy.  No spinal canal stenosis or neural foraminal narrowing. L1-2: No spinal canal stenosis or neural foraminal narrowing. L2-3: Small circumferential disc bulge and facet arthropathy results in mild bilateral neural foraminal narrowing. L3-4: Small circumferential disc bulge and facet arthropathy  result in mild bilateral neural foraminal narrowing. L4-5: Circumferential disc bulge and facet arthropathy result in mild spinal canal stenosis and mild bilateral neural foraminal narrowing. L5-S1: No spinal canal stenosis or neural foraminal narrowing.     1. Recent burst compression fracture of L1.  Minimal retropulsion without spinal canal stenosis.  No CT findings to suggest and underlying osseous lesion. 2. Multilevel degenerative change of the spine as detailed above.  No high-grade spinal canal stenosis or neural foraminal narrowing. This report was flagged in [employer] as abnormal. These findings were discovered at 08:55 on 05/14/2025 and relayed to Prince Desai MD via telephone by Jigar Mims MD at 09:05 on 05/14/2025. Electronically signed by resident: Jigar Mims Date:    05/14/2025 Time:    08:54 Electronically signed by: Raj Boyce MD Date:    05/14/2025 Time:    09:36      Recent Results (from the past 24 hours)   Comprehensive Metabolic Panel    Collection Time: 06/04/25  3:44 AM   Result Value Ref Range    Sodium 142 136 - 145 mmol/L    Potassium 4.0 3.5 - 5.1 mmol/L    Chloride 104 95 - 110 mmol/L    CO2 28 23 - 29 mmol/L    Glucose 118 (H) 70 - 110 mg/dL    BUN 39 (H) 8 - 23 mg/dL    Creatinine 1.0 0.5 - 1.4 mg/dL    Calcium 8.4 (L) 8.7 - 10.5 mg/dL    Protein Total 5.2 (L) 6.0 - 8.4 gm/dL    Albumin 2.4 (L) 3.5 - 5.2 g/dL    Bilirubin Total 0.5 0.1 - 1.0 mg/dL    ALP 99 40 - 150 unit/L    AST 25 11 - 45 unit/L    ALT 53 (H) 10 - 44 unit/L    Anion Gap 10 8 - 16 mmol/L    eGFR >60 >60 mL/min/1.73/m2   Magnesium    Collection Time: 06/04/25  3:44 AM   Result Value Ref Range    Magnesium  1.8 1.6 - 2.6 mg/dL   Phosphorus    Collection Time: 06/04/25  3:44 AM   Result Value Ref Range    Phosphorus Level 2.6 (L) 2.7 - 4.5 mg/dL   CBC with Differential    Collection Time: 06/04/25  3:44 AM   Result Value Ref Range    WBC      RBC      HGB      HCT      MCV      MCH      MCHC      RDW       Platelet Count      MPV      Nucleated RBC     CBC with Differential    Collection Time: 06/04/25  8:51 AM   Result Value Ref Range    WBC 12.92 (H) 3.90 - 12.70 K/uL    RBC 4.59 (L) 4.60 - 6.20 M/uL    HGB 12.9 (L) 14.0 - 18.0 gm/dL    HCT 41.2 40.0 - 54.0 %    MCV 90 82 - 98 fL    MCH 28.1 27.0 - 31.0 pg    MCHC 31.3 (L) 32.0 - 36.0 g/dL    RDW 16.3 (H) 11.5 - 14.5 %    Platelet Count 144 (L) 150 - 450 K/uL    MPV 11.2 9.2 - 12.9 fL    Nucleated RBC 0 <=0 /100 WBC    Neut % 80.7 (H) 38 - 73 %    Lymph % 3.6 (L) 18 - 48 %    Mono % 11.4 4 - 15 %    Eos % 0.1 <=8 %    Basophil % 0.3 <=1.9 %    Imm Grans % 3.9 (H) 0.0 - 0.5 %    Neut # 10.43 (H) 1.8 - 7.7 K/uL    Lymph # 0.46 (L) 1 - 4.8 K/uL    Mono # 1.47 (H) 0.3 - 1 K/uL    Eos # 0.01 <=0.5 K/uL    Baso # 0.04 <=0.2 K/uL    Imm Grans # 0.51 (H) 0.00 - 0.04 K/uL     A1C   Lab Results   Component Value Date    HGBA1C 5.7 (H) 03/06/2024         Assessment/Plan:     Cellulitis to Left lower leg   Dermatitis to left foot   -wash with bath wipes apply lac-hydrin daily per bedside nurse     MASD to Sacrum and Buttocks   Wound care clean with bath wipes apply Triad daily  Turn patient every 2 hours    Rash to:  RIGHT & LEFT GROIN  RIGHT & LEFT ABDOMEN  -clean with bath wipes apply antifungal powder daily per bedside nurse     Pressure wound stage I to right top ankle   -leave JOSSUE monitor     Decreased Mobility   -Patient with decreased bed mobility therefore patient is at high risk for developing wounds and deterioration of any current wounds. Patient needs frequent turning.     Nutrition :  Promote good nutrition to for improved wound healing.      Off-loading:   Follow facility bed policy for proper bed surface   Offload heels per facility protocol   Turn every 2 hours   Use Wheelchair Cushion     Patient Treatment Goals:  Short Term Goals  The wound base will be 25% .,demonstrate 25% more granulation tissue.  Short Term Goals  Patient wound status will  improve/maintain without exacerbation infection of deterioration.  Wound Healing  Patient will have a decrease in wound size by 20% in 4 weeks.  Clinical Goals  Prevention of Infection is important for wound healing  Functional Goals:  The patient will achieve proper turning schedule.    -cont medical management     High Risk Because  -Chronic illness with SEVERE exacerbation, progression, or side effects of treatment.  -Acute or chronic illness or injury that poses a threat to life or bodily function    Notify Sheree Tao NP, or wound care RN if any new issues arise or if there is deterioration in documented wounds.    Sheree Tao FNP-C  2025         [1]   Social History  Tobacco Use    Smoking status: Former     Current packs/day: 0.00     Average packs/day: 1 pack/day for 40.0 years (40.0 ttl pk-yrs)     Types: Cigarettes     Start date: 8/15/1972     Quit date: 8/15/2012     Years since quittin.8     Passive exposure: Never    Smokeless tobacco: Never   Substance Use Topics    Alcohol use: Yes     Alcohol/week: 3.0 standard drinks of alcohol     Types: 3 Cans of beer per week     Comment: maybe 2-3  beers weekly    Drug use: No

## 2025-06-04 NOTE — CARE UPDATE
06/03/25 2032   Patient Assessment/Suction   Level of Consciousness (AVPU) alert   Respiratory Effort Unlabored   Expansion/Accessory Muscles/Retractions no retractions;no use of accessory muscles   All Lung Fields Breath Sounds coarse;wheezes, expiratory;wheezes, inspiratory   Cough Frequency frequent   Skin Integrity   $ Wound Care Tech Time 15 min   Area Observed Right;Behind ear;Left   Skin Appearance without discoloration   PRE-TX-O2   Device (Oxygen Therapy) high flow nasal cannula   $ Is the patient on High Flow Oxygen? Yes   Flow (L/min) (Oxygen Therapy) 6   SpO2 96 %   Pulse Oximetry Type Continuous   $ Pulse Oximetry - Multiple Charge Pulse Oximetry - Multiple   Probe Placed On (Pulse Ox) Right:;finger   Oximetry Probe Status Intact   Pulse 100   Resp (!) 31   Positioning HOB elevated 30 degrees   Aerosol Therapy   $ Aerosol Therapy Charges Aerosol Treatment   Daily Review of Necessity (SVN) completed   Respiratory Treatment Status (SVN) given   Treatment Route (SVN) air;mask   Patient Position HOB elevated   Post Treatment Assessment (SVN) increased aeration   Signs of Intolerance (SVN) none   Breath Sounds Post-Respiratory Treatment   Throughout All Fields Post-Treatment All Fields   Throughout All Fields Post-Treatment no change   Post-treatment Heart Rate (beats/min) 97   Post-treatment Resp Rate (breaths/min) 30   Chest Physiotherapy   $ Chest Physiotherapy Charges Subsequent   Daily Review of Necessity (CPT) completed   Type (CPT) percussion   Method (CPT) by hand   Patient Position (CPT) HOB elevated   Duration per Field (CPT) 5 mins   CPT Total Time (Min) 5   Post Treatment Assessment (CPT) increased aeration   Signs of Intolerance (CPT) none   General Safety Checklist   Safety Promotion/Fall Prevention side rails raised   Airway Safety   Is Ambu Bag and Mask with Patient? Yes, Adult Ambu Bag and Mask   Respiratory Interventions   Cough And Deep Breathing done independently per patient   Preset  CPAP/BiPAP Settings   Mode Of Delivery CPAP;home unit;standby   CPAP/BIPAP charged w/in last 24 h NO   Patient CPAP/BiPAP Settings   CPAP/BIPAP ID home cpap unit   Transcutaneous Monitor   $ Care Plan Tech Time 15 min   Respiratory Evaluation   $ Care Plan Tech Time 15 min

## 2025-06-04 NOTE — H&P
[facility] Medicine  History and Physical Exam  Room: 212/212   Patient Name: Jordin Allen Jr.  MRN: [id number]  Admit Date: 6/3/2025   Length of Stay: 1  Attending Physician: Bernardino Guthrie MD  Nurse Practitioner: Ernestine Palomino NP  Code Status: Full Code    Date of Service: 06/04/2025    Principal Problem:   Acute respiratory failure with hypoxia      HPI obtained from patient, review of previous hospital records, and summarization.   HPI     Jordin Allen Jr. is a 79 y.o. 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  He presented to [facility] ED on 01/22/2025 with complaints of shortness of breath and left foot redness.  Admitted to hospital medicine team for left foot cellulitis and acute hypoxic respiratory failure secondary to pneumonia/COPD exacerbation. CTA chest negative for PE. Emphysematous change with superimposed edema. Nodular focus within the anterior aspect of left upper lobe.  Started on nebs, prednisone, and empiric vanc/cefepime and doxycycline.  Respiratory panel positive for parainfluenza virus.  Course further complicated by sinus tach with PACs/18, for which he was started on diltiazem.  Blood cultures positive for staph, suspected to be contaminant.  Repeat blood cultures returned negative.  He will switch to Augmentin and doxy.  Ongoing episodes of SVT, thought to be due to underlying hypoxia.  He was able to be weaned down to high-flow nasal cannula 15 L.  Discharged to [facility] on 06/03/2025 for rehab, wound care, and O2 weaning.      Interval History    Refused CPAP overnight   Able to weaned down to 6 L nasal cannula last night, O2 sats stable at 98%  Labs reviewed and listed below, no critical values    Past Medical History:      Past Medical History: Patient has a past medical history of Benign  neoplasm of colon (10/01/2013), Bronchitis chronic, CAD (coronary artery disease) (10/31/2013), COPD (chronic obstructive pulmonary disease), Emphysema of lung, Encounter for preventive health examination (3/21/2018), GERD (gastroesophageal reflux disease), colonic polyps, Hypertension, NAFLD (nonalcoholic fatty liver disease) (03/27/2017), SHOLA on CPAP, RLS (restless legs syndrome), and Screen for colon cancer (08/30/2013).    Past Surgical History: Patient has a past surgical history that includes Cataract extraction, bilateral; bilateral foot surgery (1993); Colon surgery (2013); Cardiac catheterization (2013); Colonoscopy (N/A, 3/21/2018); scrotal abscess removal; Colonoscopy (N/A, 4/12/2019); Colonoscopy (N/A, 5/31/2022); and Endobronchial ultrasound (Bilateral, 4/30/2024).    Social History: Patient reports that he quit smoking about 12 years ago. His smoking use included cigarettes. He started smoking about 52 years ago. He has a 40 pack-year smoking history. He has never been exposed to tobacco smoke. He has never used smokeless tobacco. He reports current alcohol use of about 3.0 standard drinks of alcohol per week. He reports that he does not use drugs.    Family History:  family history includes Heart attack in his mother; Heart disease in his mother; Hypertension in his mother; No Known Problems in his daughter and sister.    Home Medications: reconciled     Allergies: Patient is allergic to brilinta [ticagrelor], lisinopril, and metoprolol succinate.      Subjective:     Review of Systems   Constitutional:  Positive for malaise/fatigue.   Respiratory:  Positive for cough, sputum production and shortness of breath.    Cardiovascular:  Negative for chest pain and leg swelling.   Gastrointestinal:  Negative for nausea and vomiting.   Genitourinary:  Negative for dysuria and urgency.   Musculoskeletal:  Negative for myalgias.   Neurological:  Positive for weakness. Negative for dizziness.    Psychiatric/Behavioral:  Negative for depression.          Objective:     Vital Signs:  Temp:  [97.4 °F (36.3 °C)-98.3 °F (36.8 °C)]   Pulse:  []   Resp:  [18-31]   BP: (115-160)/(56-85)   SpO2:  [93 %-100 %]     Body mass index is 31.25 kg/m².           Intake and Output:    Intake/Output Summary (Last 24 hours) at 6/4/2025 0935  Last data filed at 6/4/2025 0830  Gross per 24 hour   Intake 487 ml   Output 1125 ml   Net -638 ml        Physical Exam  Constitutional:       Appearance: He is ill-appearing.   HENT:      Head: Normocephalic and atraumatic.      Mouth/Throat:      Mouth: Mucous membranes are moist.      Pharynx: Oropharynx is clear.   Eyes:      Extraocular Movements: Extraocular movements intact.      Pupils: Pupils are equal, round, and reactive to light.   Cardiovascular:      Rate and Rhythm: Normal rate. Rhythm irregular.   Pulmonary:      Breath sounds: Wheezing and rhonchi present.   Abdominal:      General: Bowel sounds are normal.      Palpations: Abdomen is soft.   Musculoskeletal:      Right lower leg: Edema present.      Left lower leg: Edema present.   Skin:     General: Skin is warm and dry.   Neurological:      Mental Status: He is alert and oriented to person, place, and time. Mental status is at baseline.   Psychiatric:         Mood and Affect: Affect is flat.         Patient consistently followed by Wound Care NP    Labs:  Recent Labs   Lab 06/01/25 1754 06/02/25 0438 06/03/25 0213   WBC 16.54* 11.04 12.52   HGB 15.2 13.1* 12.5*   HCT 48.8 42.2 40.6    173 175     Recent Labs   Lab 06/02/25 0438 06/03/25 0213 06/04/25  0344    142 142   K 4.1 4.3 4.0    103 104   CO2 29 29 28   BUN 42* 40* 39*   CREATININE 0.9 1.0 1.0   * 136* 118*   CALCIUM 8.6* 8.6* 8.4*   MG 2.0 1.9 1.8   PHOS 3.0 2.7 2.6*     Recent Labs   Lab 06/02/25 0438 06/03/25 0213 06/04/25  0344   ALKPHOS 98 92 99   ALT 58* 54* 53*   AST 17 19 25   ALBUMIN 2.5* 2.5* 2.4*   PROT 5.3*  "5.3* 5.2*   BILITOT 0.5 0.6 0.5     No results for input(s): "POCTGLUCOSE" in the last 72 hours.    Meds Scheduled:   amitriptyline  25 mg Oral QHS    aspirin  81 mg Oral QAM    atorvastatin  80 mg Oral Daily    diltiaZEM  120 mg Oral Q8H    enoxparin  40 mg Subcutaneous Daily    furosemide  40 mg Oral Daily    guaiFENesin  1,200 mg Oral BID    levalbuterol  1.2495 mg Nebulization Q4H    levETIRAcetam  500 mg Oral BID    pantoprazole  40 mg Oral Daily    roflumilast  500 mcg Oral QAM    sodium chloride 3%  4 mL Nebulization BID    tiotropium bromide  2 puff Inhalation Daily         Current Inpatient Problem List:  Active Hospital Problems    Diagnosis  POA    *Acute respiratory failure with hypoxia [J96.01]  Yes    Parainfluenza virus infection [B34.8]  Yes    Pneumonia [J18.9]  Yes    Cellulitis [L03.90]  Yes    TIA (transient ischemic attack) [G45.9]  Yes     ASA and statin      Metastasis to brain [C79.31]  Yes    Adenocarcinoma, lung, 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       CAD (coronary artery disease) [I25.10]  Yes     Chronic     -[facility] (10/31/13) for stemi: pLAD 100%, Cx with Li's, mRCA 40%, LVEF 55%,. LVEDP 15   -Xience 3.0 x 33 mm to pLAD, post dilated with 3.5 NC and 4.0 NC.   -TTE (8/14/13): LVEF 55%, grade I diastolic dysfunction      HTN (hypertension), benign [I10]  Yes    SHOLA (obstructive sleep apnea) [G47.33]  Yes     CPAP at night      GERD (gastroesophageal reflux disease) [K21.9]  Yes     Continue PPI        Resolved Hospital Problems   No resolved problems to display.         Assessment / Plan:   Acute respiratory failure with hypoxia  COPD with acute exacerbation  Parainfluenza virus infection  Pneumonia   On Symbicort Spiriva and Daliresp at home. Follows up with pulmonology outpatient.   Completed 7 day course of Augmentin and " doxycycline on 06/01  Continue Daliresp 500mcg daily  Continue Xopenex p.r.n.   Completed course of prednisone  Continue doxycycline 100 mg b.i.d.  Continue weaning high-flow nasal cannula  Continue furosemide 40 mg daily  Continue guaifenesin 1200 mg b.i.d.    Cellulitis   Wound followed and managed by consistent, contracted [facility] NP  Completed 7 day course of doxy and Augmentin  Will need follow up with Podiatry after discharge    Restless leg syndrome   Continue amitriptyline 25 mg nightly    Adenocarcinoma, lung, left   Metastasis to brain  Completed treatment with chemo/XRT. Brain XRT for lesionsx2. off immunotherapy since 12/24  suspect the LLL area was consolidation of prior RXT changes and not malignancy and is nearly resolved.  CT on presentation with new SANJAY nodule, need op follow up with pulm  Continue prophylactic Keppra 500 mg b.i.d.    Dyslipidemia  TIA  CAD   Continue aspirin   Continue atorvastatin 80 mg daily    Tachycardia   Episodes of SVT while at the hospital   Evaluated by Cardiology   Likely worsened by underlying hypoxia   Supplemental O2   Continue diltiazem 120 mg q.8 hours  Cardiac monitoring    SHOLA   Continue CPAP nightly    GERD  Continue PPI daily    Left lateral foot wound   Right anterior ankle wound   Traumatic left dorsal foot wound   Left anterior foot venous ulcer   Skin tear right distal arm   Pressure injury nose  Wound followed and managed by consistent, contracted [facility] NP          Diet:  Cardiac   GI Ppx:  PPI   DVT Ppx:  Enoxaparin    Lines:  PIV   Drains:  None   Airways:n/a   Wounds:  Multiple    Goals of Care:   Restorative  Treat infection  Optimize nutrition  Wound healing  Muscle strengthening  Restoration of ADL's  Improve mobility    Anticipated Disposition:    TBD  Will need follow up with pulmonology and Podiatry  Follow up appointments:   Future Appointments   Date Time Provider Department Center   6/24/2025  7:15 AM [facility] OIC-MRI2 [facility] MRI IC  Imaging Ctr   6/24/2025  9:30 AM Steven Campos MD [facility] NEUROSC [place]   6/24/2025  2:00 PM Bienvenido Henson MD [facility] RADONC3 [place]   7/1/2025 10:30 AM [facility] OIC-XRAY NOM XRAY IC Imaging [facility]   7/1/2025 11:30 AM Bolivar Sr PA [facility] NEUROS8 [facility]   7/8/2025  9:15 AM MEEKS, VISUAL FIELDS [facility] OPHTHAL [facility]   7/8/2025 10:00 AM Cara Looney MD [facility] OPHTHAL [facility]   7/15/2025 10:00 AM Garrett Lloyd DPM [facility] POD [facility] Ort   7/23/2025 10:15 AM CV [facility] ECHO [facility] CARDIA [place]   8/15/2025  9:20 AM Bienvenido Ward MD [facility] PULMON [place] [facility]   8/27/2025  9:00 AM Guido Trejo MD [facility] ENT [facility]   9/8/2025 11:10 AM Yan Palmer MD [facility] DERM [facility]       Ernestine Palomino, NP  Hospital Medicine [facility]    Total time spent: 126 minutes  Non Face to Face Time: 89 minutes  Description of Time: Time spent in the care of new patient (counseling patient on plan of care, orienting to new facility, coordinating patient care, extensive chart review of previous medical records and summarization, medication reconciliation, initiating consults, reviewing and interpreting labs and any pertinent imaging, reviewing all transferring facility orders, reviewing all transferring facility medications, initiating new orders, and documentation. Discussed patient's clinical course and plan of care with attending, Dr. Guthrie.

## 2025-06-04 NOTE — PLAN OF CARE
No acute events throughout shift. See vital signs and assessments for documentation. See below for updates.    Pulmonary:  On HFNC 6 LPM, O2 sat goal >/= 88% SpO2.  Flonase and mupirocin ointment added to MAR for nostril soreness/discomfort.    Cardiac: NSR/Sinus Tachycardia.    Neurological: AAO X 4.    Gastrointestinal: No BM reported, 06/01/2025 last reported BM.    Genitourinary: Frequently urinating, requested Elkins. Patient was offered a condom catheter or external purewick because a elkins is not indicated.    Endocrine: Heart healthy diet, however, patient refuses any intake. Provided patient with alternative options, only intake was chocolate ice cream and fruit salad.    Integumentary/other: Bruising all over, assessed by wound care this shift.    Gtts:  N/A.    POC: PT/OT, Oxygen weaning, pain control and comfort.    Mr. Allen updated on POC. Questions and concerns addressed. TM

## 2025-06-04 NOTE — PLAN OF CARE
Problem: Breathing Pattern Ineffective  Goal: Effective Breathing Pattern  Intervention: Promote Improved Breathing Pattern  Flowsheets (Taken 6/4/2025 1054)  Airway/Ventilation Management:   airway patency maintained   humidification applied   calming measures promoted   pulmonary hygiene promoted  Breathing Techniques/Airway Clearance: deep/controlled cough encouraged  Head of Bed (HOB) Positioning: HOB elevated     Problem: Airway Clearance Ineffective  Goal: Effective Airway Clearance  Intervention: Promote Airway Secretion Clearance  Flowsheets (Taken 6/4/2025 6033)  Breathing Techniques/Airway Clearance: deep/controlled cough encouraged  Cough And Deep Breathing: done independently per patient

## 2025-06-04 NOTE — PT/OT/SLP EVAL
Physical Therapy Evaluation    Co-eval with OT due to patients requiring two skilled therapists to safely perform mobility and to accommodate patients activity tolerance.       Patient Name:  Jordin Allen Jr.   MRN:  [id number]    Recommendations:     Discharge Recommendations: Moderate Intensity Therapy   Discharge Equipment Recommendations: cane, straight, walker, rolling, shower chair   Barriers to discharge: Inaccessible home    Assessment:     Jordin Allen Jr. is a 79 y.o. male admitted with a medical diagnosis of Acute respiratory failure with hypoxia.  He presents with the following impairments/functional limitations: weakness, impaired endurance, gait instability, impaired balance, impaired cardiopulmonary response to activity, orthopedic precautions Patient motivated to participate in therapy. SOB limits ability to participate. .    Rehab Prognosis: Good and Fair; patient would benefit from acute skilled PT services to address these deficits and reach maximum level of function.    Recent Surgery: * No surgery found *      Plan:     During this hospitalization, patient to be seen 5 x/week to address the identified rehab impairments via gait training, therapeutic activities, therapeutic exercises, neuromuscular re-education and progress toward the following goals:    Plan of Care Expires:       Subjective     Chief Complaint: no co pain  Patient/Family Comments/goals: to walk again  Pain/Comfort:  Pain Rating 1: 0/10    Patients cultural, spiritual, Evangelical conflicts given the current situation:      Living Environment:  Prior to recent hospitalization patient states he used a cane to walk household level. Lives with daughter in two story home. He stays on the first floor and has a full bathroom on first floor.   Prior to admission, patients level of function was Independent..  Equipment used at home:  .  DME owned (not currently used): rolling walker, single point cane, and shower chair.  Upon  discharge, patient will have assistance from family.    Objective:     Communicated with nurse prior to session.  Patient found HOB elevated with oxygen, pulse ox (continuous), telemetry, blood pressure cuff  upon PT entry to room.    General Precautions: Standard, fall, droplet, special contact  Orthopedic Precautions:spinal precautions   Braces: TLSO  Respiratory Status: Nasal cannula, flow 6 L/min    Exams:  Gross Motor Coordination:  WFL    Functional Mobility:  Bed Mobility:     Rolling Left:  contact guard assistance  Rolling Right: contact guard assistance  Supine to Sit: moderate assistance  Sit to Supine: moderate assistance  Transfers:     Sit to Stand:  moderate assistance and of 2 persons with hand-held assist      AM-PAC 6 CLICK MOBILITY  Total Score:11       Treatment & Education:  Patient supine in bed upon entry. Functional mobility as per above. Patient sat EOB for leticia 15 minutes with S. Sit to stand with mod a x 2. Patient stood for leticia 2-3 minutes to urinate in portable urinal. Returned to bed . Seated ther ex 2 x 20 resp LAQ, hip flexion, AP and ABD. O2 sat >95% during therapy, -130. Patient with frequent co SOB.     Pt educated on role and purpose of therapy  Pt educated on goal setting  Pt educated on benefits of OOB activity    Returned to bed with mod a    Patient left HOB elevated with all lines intact and call button in reach.    GOALS:   Multidisciplinary Problems       Physical Therapy Goals          Problem: Physical Therapy    Goal Priority Disciplines Outcome Interventions   Physical Therapy Goal     PT, PT/OT Progressing    Description: Goals to be met by: DC     Patient will increase functional independence with mobility by performin. Supine to sit with [place]  2. Sit to supine with [place]  3. Sit to stand transfer with Supervision with RW  4. Gait  x 150 feet with Contact Guard Assistance using Rolling Walker.                          DME Justifications:  No  DME recommended requiring DME justifications    History:     Past Medical History:   Diagnosis Date    Benign neoplasm of colon 10/01/2013    Bronchitis chronic     CAD (coronary artery disease) 10/31/2013    COPD (chronic obstructive pulmonary disease)     Emphysema of lung     Encounter for preventive health examination 3/21/2018    GERD (gastroesophageal reflux disease)     Hx of colonic polyps     Hypertension     NAFLD (nonalcoholic fatty liver disease) 03/27/2017    SHOLA on CPAP     RLS (restless legs syndrome)     Screen for colon cancer 08/30/2013       Past Surgical History:   Procedure Laterality Date    bilateral foot surgery  1993    CARDIAC CATHETERIZATION  2013    x1    CATARACT EXTRACTION, BILATERAL      COLON SURGERY  2013    Ace Mott MD    COLONOSCOPY N/A 3/21/2018    Procedure: COLONOSCOPY;  Surgeon: Terry Mott MD;  Location: [facility] ([address] [place] [address]);  Service: Endoscopy;  Laterality: N/A;    COLONOSCOPY N/A 4/12/2019    Procedure: COLONOSCOPY;  Surgeon: John Mantilla MD;  Location: [facility] ([address] [place] [address]);  Service: Endoscopy;  Laterality: N/A;    COLONOSCOPY N/A 5/31/2022    Procedure: COLONOSCOPY;  Surgeon: MEDINA Farris MD;  Location: [facility] ([address] [place] [address]);  Service: Endoscopy;  Laterality: N/A;  fully vacc-inst portal-tb    ENDOBRONCHIAL ULTRASOUND Bilateral 4/30/2024    Procedure: ENDOBRONCHIAL ULTRASOUND (EBUS);  Surgeon: Naveed Aguero MD;  Location: [facility] OR;  Service: Pulmonary;  Laterality: Bilateral;    scrotal abscess removal         Time Tracking:     PT Received On: 06/04/25  PT Start Time: 0954     PT Stop Time: 1023  PT Total Time (min): 29 min     Billable Minutes: Evaluation 19 and Therapeutic Activity 10      06/04/2025

## 2025-06-04 NOTE — PT/OT/SLP EVAL
"Occupational Therapy   Evaluation and Treatment    Co-evaluation with PT to have 2 skilled therapists present to safely assess pt's functional mobility.     Name: Jordin Allen Jr.  MRN: [id number]  Admitting Diagnosis: Acute respiratory failure with hypoxia  Recent Surgery: * No surgery found *      Recommendations:     Discharge Recommendations: Moderate Intensity Therapy  Discharge Equipment Recommendations:  other (see comments) (TBD pending progress)  Barriers to discharge:  Decreased caregiver support, Other (Comment) (increased (A) with ADLs and mobility)    Assessment:     Jordin Allen Jr. is a 79 y.o. male with a medical diagnosis of Acute respiratory failure with hypoxia.  Pt tolerated session well and without incident, but he was limited by SOB.  He requires (A) with ADLs and mobility and is performing well below his functional baseline.  Due to his current level of function compared to his PLOF and his home environment, moderate intensity therapy recommended at d/c for maximal pt gains in functional independence and to ensure his safety upon returning home.  He presents with the following.  Performance deficits affecting function: weakness, impaired endurance, impaired self care skills, impaired functional mobility, gait instability, impaired balance, orthopedic precautions, impaired cardiopulmonary response to activity.      Rehab Prognosis: Good; patient would benefit from acute skilled OT services to address these deficits and reach maximum level of function.       Plan:     Patient to be seen 5 x/week to address the above listed problems via self-care/home management, therapeutic activities, therapeutic exercises  Plan of Care Expires: 07/03/25  Plan of Care Reviewed with: patient    Subjective     Chief Complaint: SOB  Patient/Family Comments/goals: "I want to be able to care for myself."    Occupational Profile:  Living Environment: Pt lives with his daughter on the 1st floor of a 2  with 1 " MICHAEL.  He has a walk-in shower with a shower chair on the 1st floor.  Pt reports no recent falls.   Previous level of function: Independent - Mod I with most ADLs but required (A) with bathing.  Mod I to ambulate with a SPC.  Pt said his functional decline began in February 2025 when he had a TIA.   Roles and Routines: father, father-in-law; retired  Equipment Used at Home: cane, straight, shower chair, walker, rolling (Pt owns but doesn't use RW (is new).)  No home oxygen.    Assistance upon Discharge: His daughter works.  His son-in-law works from home on the 2nd floor.     Pain/Comfort:  Pain Rating 1: 0/10  Pain Rating Post-Intervention 1: 0/10    Patients cultural, spiritual, Rastafari conflicts given the current situation: no    Objective:     Communicated with: nurse and PT prior to session.  Patient found HOB elevated with telemetry, oxygen, blood pressure cuff, pulse ox (continuous), peripheral IV upon OT entry to room.    General Precautions: Standard, fall, droplet, aspiration  Orthopedic Precautions: spinal precautions  Braces: TLSO  Respiratory Status: High flow nasal cannula, flow 6 L/min with humidification    Occupational Performance:    Bed Mobility:    Patient completed Rolling/Turning to Left with stand by assistance  Patient completed Rolling/Turning to Right with stand by assistance  Patient completed Supine to Sit to the R with moderate assistance for trunk management  Patient completed Sit to Supine with moderate assistance for LE management  Pt scooted to HOB while supine with HOB flat with Max A of 2 persons     Functional Mobility/Transfers:  Patient completed Sit <> Stand Transfer from EOB x 1 trial with minimum assistance and of 2 persons with no assistive device   Functional Mobility: Pt unable to perform due to SOB.    Activities of Daily Living:  Upper Body Dressing: maximal assistance to don/doff back gown as a robe and TLSO brace while seated EOB  Lower Body Dressing: total  assistance to adjust non-skid socks while supine  Toileting: minimum assistance and of 2 persons to urinate into urinal while standing EOB with PT providing standing balance (A) while writing therapist managed pt's gown for him to place urinal properly.  Min A to use urinal again while at bed level with (A) to manage his gown to properly place urinal.  Both measurements were ~200 mL.  Other measurement of ~200 mL present in urinal during OT/PT entrance.  Nurse notified of output.     Cognitive/Visual Perceptual:  Cognitive/Psychosocial Skills:     -       Oriented to: Person, Place, Time, and Situation   -       Follows Commands/attention: Follows multistep  commands  -       Communication: clear/fluent    Physical Exam:  Sensation:    -       Intact BUE  Upper Extremity Range of Motion:     -       Right Upper Extremity: WFL  -       Left Upper Extremity: WFL  Upper Extremity Strength:    -       Right Upper Extremity: WFL  -       Left Upper Extremity: WFL   Strength:    -       Right Upper Extremity: WFL  -       Left Upper Extremity: WFL  Fine Motor Coordination:    -       Intact  Left hand thumb/finger opposition skills and Right hand thumb/finger opposition skills    AMPA 6 Click ADL:  AMPAC Total Score: 15    Treatment & Education:  Pt edu on role of OT, POC, his spinal precautions, safety when performing self care tasks, benefit of performing EOB/OOB activity, and safety when performing functional transfers and functional mobility.    - Self care tasks completed-- as noted above      Pt's oxygen saturation rate remained > 95% but his HR ranged from 120s-130s consistently.     Patient left HOB elevated with all lines intact, call button in reach, bed alarm on, and nursing notified    GOALS:   Multidisciplinary Problems       Occupational Therapy Goals          Problem: Occupational Therapy    Goal Priority Disciplines Outcome Interventions   Occupational Therapy Goal     OT, PT/OT Progressing     Description: Goals to be met by: 7/3/2025     Patient will increase functional independence with ADLs by performing:    UE Dressing with Set-up Assistance.  LE Dressing with SBA using appropriate AE as needed.  Grooming while seated at sink with Set-up Assistance.  Toileting from 3-in-1 commode over toilet with Stand-by Assistance for hygiene and clothing management.   Supine to sit with Mod I.   Step transfer with Stand-by Assistance with RW.  Toilet transfer to 3-in-1 commode over toilet with Stand-by Assistance with RW.                         History:     Past Medical History:   Diagnosis Date    Benign neoplasm of colon 10/01/2013    Bronchitis chronic     CAD (coronary artery disease) 10/31/2013    COPD (chronic obstructive pulmonary disease)     Emphysema of lung     Encounter for preventive health examination 3/21/2018    GERD (gastroesophageal reflux disease)     Hx of colonic polyps     Hypertension     NAFLD (nonalcoholic fatty liver disease) 03/27/2017    SHOLA on CPAP     RLS (restless legs syndrome)     Screen for colon cancer 08/30/2013         Past Surgical History:   Procedure Laterality Date    bilateral foot surgery  1993    CARDIAC CATHETERIZATION  2013    x1    CATARACT EXTRACTION, BILATERAL      COLON SURGERY  2013    Ace Mott MD    COLONOSCOPY N/A 3/21/2018    Procedure: COLONOSCOPY;  Surgeon: Terry Mott MD;  Location: [address]);  Service: Endoscopy;  Laterality: N/A;    COLONOSCOPY N/A 4/12/2019    Procedure: COLONOSCOPY;  Surgeon: John Mantilla MD;  [address]  Service: Endoscopy;  Laterality: N/A;    COLONOSCOPY N/A 5/31/2022    Procedure: COLONOSCOPY;  Surgeon: MEDINA Farris MD;  Location: [address]);  Service: Endoscopy;  Laterality: N/A;  fully vacc-inst portal-tb    ENDOBRONCHIAL ULTRASOUND Bilateral 4/30/2024    Procedure: ENDOBRONCHIAL ULTRASOUND (EBUS);  Surgeon: Naveed Aguero MD;  Location: [facility] OR;  Service: Pulmonary;   Laterality: Bilateral;    scrotal abscess removal         Time Tracking:     OT Date of Treatment: 06/04/25  OT Start Time: 0951  OT Stop Time: 1024  OT Total Time (min): 33 min    Billable Minutes:Evaluation 10 min  Self Care/Home Management 23 min    6/4/2025

## 2025-06-04 NOTE — PROGRESS NOTES
06/04/25 1030   WOCN Assessment   WOCN Total Time (mins) 30   Visit Date 06/04/25   Visit Time 1030   Consult Type New   WOCN Speciality Wound   Wound other   Number of Wounds 9   Intervention assessed;changed;orders;team conference   Marked no   Skin Interventions   Device Skin Pressure Protection absorbent pad utilized/changed;pressure points protected;skin-to-skin areas padded;tubing/devices free from skin contact   Pressure Reduction Devices heel offloading device utilized;pressure-redistributing mattress utilized;specialty bed utilized   Pressure Reduction Techniques frequent weight shift encouraged;heels elevated off bed;positioned off wounds   Skin Protection hydrocolloids used   Positioning   Body Position 30 degrees   Head of Bed (HOB) Positioning HOB elevated;HOB at 30 degrees   Positioning/Transfer Devices pillows   Pressure Injury Prevention    Check Moisture Management Pad Done   Heel protection technique Pillow   Check Medical Devices Done        Wound 05/19/25 0233 Moisture associated dermatitis Left dorsal Foot   Date First Assessed/Time First Assessed: 05/19/25 0233   Present on Original Admission: Yes  Primary Wound Type: Moisture associated dermatitis  Side: Left  Orientation: dorsal  Location: Foot  Is this injury device related?: No   Wound Image    Dressing Appearance Open to air   Drainage Amount None   Drainage Characteristics/Odor No odor   Appearance Dry   Red (%), Wound Tissue Color 100 %   Periwound Area [place]   Care   (nursing to apply Lachydrin)        Wound 05/19/25 0231 Moisture associated dermatitis Left lateral Foot   Date First Assessed/Time First Assessed: 05/19/25 0231   Present on Original Admission: Yes  Primary Wound Type: Moisture associated dermatitis  Side: Left  Orientation: lateral  Location: Foot   Wound Image     (took photo of all angles of foot)   Dressing Appearance Open to air   Drainage Amount None   Drainage Characteristics/Odor No odor   Appearance Dry   Tissue  loss description Not applicable   Periwound Area Dry   Wound Edges Approximated   Care   (nursing to apply Lachydrin, by nursing)        Wound 05/19/25 0233 Moisture associated dermatitis Left anterior Foot   Date First Assessed/Time First Assessed: 05/19/25 0233   Primary Wound Type: Moisture associated dermatitis  Side: Left  Orientation: anterior  Location: Foot   Wound Image    Dressing Appearance Open to air   Drainage Amount None   Drainage Characteristics/Odor No odor   Appearance Dry   Tissue loss description Not applicable   Red (%), Wound Tissue Color 100 %   Periwound Area Pink   Wound Edges Undefined   Care   (nursing to apply Lachydrin)   Periwound Care Dry periwound area maintained        Wound 05/19/25 0231 Pressure Injury Right anterior Ankle   Date First Assessed/Time First Assessed: 05/19/25 0231   Present on Original Admission: Yes  Primary Wound Type: Pressure Injury  Side: Right  Orientation: anterior  Location: Ankle   Wound Image    Pressure Injury Stage 1   Dressing Appearance Moist drainage   Drainage Amount Scant   Drainage Characteristics/Odor No odor   Appearance Pink   Tissue loss description Partial thickness   Red (%), Wound Tissue Color 100 %   Periwound Area Intact   Wound Length (cm) 0.5 cm   Wound Width (cm) 0.5 cm   Wound Depth (cm) 0.1 cm   Wound Volume (cm^3) 0.013 cm^3   Wound Surface Area (cm^2) 0.2 cm^2   Care Cleansed with:   Dressing   (apply mepi border)   [REMOVED]      Wound 05/22/25 1933 Skin Tear Right distal Arm   Final Assessment Date/Final Assessment Time: 06/04/25 1030  Date First Assessed/Time First Assessed: 05/22/25 1933   Present on Original Admission: No  Primary Wound Type: Skin Tear  Side: Right  Orientation: distal  Location: Arm  Wound Outcome: Healed   Wound Image    Dressing Appearance Intact   Drainage Amount None   Appearance Dry   Red (%), Wound Tissue Color 100 %   Periwound Area Dry   Wound Length (cm) 0 cm   Wound Width (cm) 0 cm   Wound Depth (cm)  0 cm   Wound Volume (cm^3) 0 cm^3   Wound Surface Area (cm^2) 0 cm^2   Care Cleansed with:;Wound cleanser   Periwound Care Dry periwound area maintained   [REMOVED]      Wound 05/22/25 1933 Pressure Injury Nose   Final Assessment Date/Final Assessment Time: 06/04/25 1030  Date First Assessed/Time First Assessed: 05/22/25 1933   Present on Original Admission: Yes  Primary Wound Type: Pressure Injury  Location: Nose  Is this injury device related?: (c) Yes  Woun...   Wound Image    Dressing Appearance Open to air   Drainage Amount None   Drainage Characteristics/Odor No odor   Appearance Dry   Red (%), Wound Tissue Color 100 %   Periwound Area Dry   Wound Length (cm) 0 cm   Wound Width (cm) 0 cm   Wound Depth (cm) 0 cm   Wound Volume (cm^3) 0 cm^3   Wound Surface Area (cm^2) 0 cm^2   Care Cleansed with:;Wound cleanser   Dressing   (open to air, dry)        Wound 06/04/25 1030 Moisture associated dermatitis Right lateral Abdomen   Date First Assessed/Time First Assessed: 06/04/25 1030   Present on Original Admission: Yes  Primary Wound Type: Moisture associated dermatitis  Side: Right  Orientation: lateral  Location: Abdomen   Wound Image    Dressing Appearance No dressing   Drainage Amount None   Red (%), Wound Tissue Color 100 %   Periwound Area Moist   Care Wound cleanser   Dressing   (applied Triad paste)        Wound 06/04/25 1030 Moisture associated dermatitis Left lateral Abdomen   Date First Assessed/Time First Assessed: 06/04/25 1030   Present on Original Admission: Yes  Primary Wound Type: Moisture associated dermatitis  Side: Left  Orientation: lateral  Location: Abdomen   Wound Image    Dressing Appearance Open to air   Drainage Amount None   Appearance Moist   Red (%), Wound Tissue Color 100 %   Periwound Area Moist   Care Wound cleanser   Dressing   (applied Triad paste)        Wound 06/04/25 1030 Moisture associated dermatitis Right Groin   Date First Assessed/Time First Assessed: 06/04/25 1030   Present  on Original Admission: Yes  Primary Wound Type: Moisture associated dermatitis  Side: Right  Location: Groin  Is this injury device related?: No   Wound Image    Dressing Appearance Open to air   Drainage Amount None   Red (%), Wound Tissue Color 100 %   Periwound Area Moist   Care Cleansed with:;Wound cleanser   Dressing   (applied Triad paste)        Wound 06/04/25 1030 Moisture associated dermatitis Left Groin   Date First Assessed/Time First Assessed: 06/04/25 1030   Present on Original Admission: Yes  Primary Wound Type: Moisture associated dermatitis  Side: Left  Location: Groin   Wound Image    Drainage Amount None   Red (%), Wound Tissue Color 100 %   Periwound Area Moist   Care Cleansed with:;Wound cleanser   Dressing   (applied Triad paste)        Wound 06/04/25 1030 Moisture associated dermatitis Sacral spine   Date First Assessed/Time First Assessed: 06/04/25 1030   Present on Original Admission: Yes  Primary Wound Type: (c) Moisture associated dermatitis  Location: (c) Sacral spine   Wound Image    Drainage Amount None   Drainage Characteristics/Odor No odor   Appearance Moist   Periwound Area Moist   Care Wound cleanser   Dressing   (applied Triad paste)

## 2025-06-04 NOTE — PROGRESS NOTES
06/04/25 1030        Wound 05/19/25 0233 Moisture associated dermatitis Left dorsal Foot   Date First Assessed/Time First Assessed: 05/19/25 0233   Present on Original Admission: Yes  Primary Wound Type: Moisture associated dermatitis  Side: Left  Orientation: dorsal  Location: Foot  Is this injury device related?: No   Wound Image    Dressing Appearance Open to air   Drainage Amount None   Drainage Characteristics/Odor No odor   Appearance Dry   Red (%), Wound Tissue Color 100 %   Periwound Area [place]   Care   (nursing to apply Lachydrin)        Wound 05/19/25 0231 Moisture associated dermatitis Left lateral Foot   Date First Assessed/Time First Assessed: 05/19/25 0231   Present on Original Admission: Yes  Primary Wound Type: Moisture associated dermatitis  Side: Left  Orientation: lateral  Location: Foot   Wound Image     (took photo of all angles of foot)   Dressing Appearance Open to air   Drainage Amount None   Drainage Characteristics/Odor No odor   Appearance Dry   Tissue loss description Not applicable   Periwound Area Dry   Wound Edges Approximated   Care   (nursing to apply Lachydrin, by nursing)        Wound 05/19/25 0233 Moisture associated dermatitis Left anterior Foot   Date First Assessed/Time First Assessed: 05/19/25 0233   Primary Wound Type: Moisture associated dermatitis  Side: Left  Orientation: anterior  Location: Foot   Wound Image    Dressing Appearance Open to air   Drainage Amount None   Drainage Characteristics/Odor No odor   Appearance Dry   Tissue loss description Not applicable   Red (%), Wound Tissue Color 100 %   Periwound Area Pink   Wound Edges Undefined   Care   (nursing to apply Lachydrin)   Periwound Care Dry periwound area maintained        Wound 05/19/25 0231 Pressure Injury Right anterior Ankle   Date First Assessed/Time First Assessed: 05/19/25 0231   Present on Original Admission: Yes  Primary Wound Type: Pressure Injury  Side: Right  Orientation: anterior  Location: Ankle    Wound Image    Pressure Injury Stage 1   Dressing Appearance Moist drainage   Drainage Amount Scant   Drainage Characteristics/Odor No odor   Appearance Pink   Tissue loss description Partial thickness   Red (%), Wound Tissue Color 100 %   Periwound Area Intact   Wound Length (cm) 0.5 cm   Wound Width (cm) 0.5 cm   Wound Depth (cm) 0.1 cm   Wound Volume (cm^3) 0.013 cm^3   Wound Surface Area (cm^2) 0.2 cm^2   Care Cleansed with:   Dressing   (apply mepi border)   [REMOVED]      Wound 05/22/25 1933 Skin Tear Right distal Arm   Final Assessment Date/Final Assessment Time: 06/04/25 1030  Date First Assessed/Time First Assessed: 05/22/25 1933   Present on Original Admission: No  Primary Wound Type: Skin Tear  Side: Right  Orientation: distal  Location: Arm  Wound Outcome: Healed   Wound Image    Dressing Appearance Intact   Drainage Amount None   Appearance Dry   Red (%), Wound Tissue Color 100 %   Periwound Area Dry   Wound Length (cm) 0 cm   Wound Width (cm) 0 cm   Wound Depth (cm) 0 cm   Wound Volume (cm^3) 0 cm^3   Wound Surface Area (cm^2) 0 cm^2   Care Cleansed with:;Wound cleanser   Periwound Care Dry periwound area maintained   [REMOVED]      Wound 05/22/25 1933 Pressure Injury Nose   Final Assessment Date/Final Assessment Time: 06/04/25 1030  Date First Assessed/Time First Assessed: 05/22/25 1933   Present on Original Admission: Yes  Primary Wound Type: Pressure Injury  Location: Nose  Is this injury device related?: (c) Yes  Woun...   Wound Image    Dressing Appearance Open to air   Drainage Amount None   Drainage Characteristics/Odor No odor   Appearance Dry   Red (%), Wound Tissue Color 100 %   Periwound Area Dry   Wound Length (cm) 0 cm   Wound Width (cm) 0 cm   Wound Depth (cm) 0 cm   Wound Volume (cm^3) 0 cm^3   Wound Surface Area (cm^2) 0 cm^2   Care Cleansed with:;Wound cleanser   Dressing   (open to air, dry)        Wound 06/04/25 1030 Moisture associated dermatitis Right lateral Abdomen   Date First  Assessed/Time First Assessed: 06/04/25 1030   Present on Original Admission: Yes  Primary Wound Type: Moisture associated dermatitis  Side: Right  Orientation: lateral  Location: Abdomen   Wound Image    Dressing Appearance No dressing   Drainage Amount None   Red (%), Wound Tissue Color 100 %   Periwound Area Moist   Care Wound cleanser   Dressing   (applied Triad paste)        Wound 06/04/25 1030 Moisture associated dermatitis Left lateral Abdomen   Date First Assessed/Time First Assessed: 06/04/25 1030   Present on Original Admission: Yes  Primary Wound Type: Moisture associated dermatitis  Side: Left  Orientation: lateral  Location: Abdomen   Wound Image    Dressing Appearance Open to air   Drainage Amount None   Appearance Moist   Red (%), Wound Tissue Color 100 %   Periwound Area Moist   Care Wound cleanser   Dressing   (applied Triad paste)        Wound 06/04/25 1030 Moisture associated dermatitis Right Groin   Date First Assessed/Time First Assessed: 06/04/25 1030   Present on Original Admission: Yes  Primary Wound Type: Moisture associated dermatitis  Side: Right  Location: Groin  Is this injury device related?: No   Wound Image    Dressing Appearance Open to air   Drainage Amount None   Red (%), Wound Tissue Color 100 %   Periwound Area Moist   Care Cleansed with:;Wound cleanser   Dressing   (applied Triad paste)        Wound 06/04/25 1030 Moisture associated dermatitis Left Groin   Date First Assessed/Time First Assessed: 06/04/25 1030   Present on Original Admission: Yes  Primary Wound Type: Moisture associated dermatitis  Side: Left  Location: Groin   Wound Image    Drainage Amount None   Red (%), Wound Tissue Color 100 %   Periwound Area Moist   Care Cleansed with:;Wound cleanser   Dressing   (applied Triad paste)        Wound 06/04/25 1030 Moisture associated dermatitis Sacral spine   Date First Assessed/Time First Assessed: 06/04/25 1030   Present on Original Admission: Yes  Primary Wound Type: (c)  Moisture associated dermatitis  Location: (c) Sacral spine   Wound Image    Drainage Amount None   Drainage Characteristics/Odor No odor   Appearance Moist   Periwound Area Moist   Care Wound cleanser   Dressing   (applied Triad paste)

## 2025-06-04 NOTE — PLAN OF CARE
Problem: Occupational Therapy  Goal: Occupational Therapy Goal  Description: Goals to be met by: 7/3/2025     Patient will increase functional independence with ADLs by performing:    UE Dressing with Set-up Assistance.  LE Dressing with SBA using appropriate AE as needed.  Grooming while seated at sink with Set-up Assistance.  Toileting from 3-in-1 commode over toilet with Stand-by Assistance for hygiene and clothing management.   Supine to sit with Mod I.   Step transfer with Stand-by Assistance with RW.  Toilet transfer to 3-in-1 commode over toilet with Stand-by Assistance with RW.    Outcome: Progressing     Pt evaluated and OT goals established.

## 2025-06-04 NOTE — NURSING
VSS overnight.  No complaints.  PRN medication given for sleep and anxiety with good results.  Slept on and off in between respiratory treatments. Pt is AAO x 4 and extremely [place]. Safety maintained.

## 2025-06-05 PROCEDURE — 25000003 PHARM REV CODE 250: Performed by: NURSE PRACTITIONER

## 2025-06-05 PROCEDURE — 97535 SELF CARE MNGMENT TRAINING: CPT

## 2025-06-05 PROCEDURE — 25000242 PHARM REV CODE 250 ALT 637 W/ HCPCS

## 2025-06-05 PROCEDURE — 11000001 HC ACUTE MED/SURG PRIVATE ROOM

## 2025-06-05 PROCEDURE — 94668 MNPJ CHEST WALL SBSQ: CPT

## 2025-06-05 PROCEDURE — 99900035 HC TECH TIME PER 15 MIN (STAT)

## 2025-06-05 PROCEDURE — 25000003 PHARM REV CODE 250: Performed by: STUDENT IN AN ORGANIZED HEALTH CARE EDUCATION/TRAINING PROGRAM

## 2025-06-05 PROCEDURE — 97530 THERAPEUTIC ACTIVITIES: CPT | Mod: CQ

## 2025-06-05 PROCEDURE — 25000003 PHARM REV CODE 250

## 2025-06-05 PROCEDURE — 94640 AIRWAY INHALATION TREATMENT: CPT

## 2025-06-05 PROCEDURE — 94761 N-INVAS EAR/PLS OXIMETRY MLT: CPT

## 2025-06-05 PROCEDURE — 63600175 PHARM REV CODE 636 W HCPCS

## 2025-06-05 RX ORDER — POLYETHYLENE GLYCOL 3350 17 G/17G
17 POWDER, FOR SOLUTION ORAL 2 TIMES DAILY PRN
Status: DISCONTINUED | OUTPATIENT
Start: 2025-06-05 | End: 2025-06-30

## 2025-06-05 RX ORDER — MIRTAZAPINE 7.5 MG/1
7.5 TABLET, FILM COATED ORAL NIGHTLY
Status: DISCONTINUED | OUTPATIENT
Start: 2025-06-05 | End: 2025-06-07

## 2025-06-05 RX ORDER — CALCIUM CARBONATE 200(500)MG
500 TABLET,CHEWABLE ORAL 3 TIMES DAILY PRN
Status: DISCONTINUED | OUTPATIENT
Start: 2025-06-05 | End: 2025-07-10 | Stop reason: HOSPADM

## 2025-06-05 RX ORDER — ONDANSETRON 4 MG/1
4 TABLET, ORALLY DISINTEGRATING ORAL EVERY 6 HOURS PRN
Status: DISCONTINUED | OUTPATIENT
Start: 2025-06-05 | End: 2025-07-10 | Stop reason: HOSPADM

## 2025-06-05 RX ORDER — ONDANSETRON 4 MG/1
4 TABLET, FILM COATED ORAL EVERY 6 HOURS PRN
Status: DISCONTINUED | OUTPATIENT
Start: 2025-06-05 | End: 2025-06-05

## 2025-06-05 RX ORDER — ROFLUMILAST 500 UG/1
500 TABLET ORAL DAILY
Status: DISCONTINUED | OUTPATIENT
Start: 2025-06-06 | End: 2025-07-07

## 2025-06-05 RX ADMIN — GUAIFENESIN 1200 MG: 600 TABLET, MULTILAYER, EXTENDED RELEASE ORAL at 09:06

## 2025-06-05 RX ADMIN — FUROSEMIDE 40 MG: 40 TABLET ORAL at 09:06

## 2025-06-05 RX ADMIN — Medication: at 09:06

## 2025-06-05 RX ADMIN — LEVETIRACETAM 500 MG: 500 TABLET, FILM COATED ORAL at 09:06

## 2025-06-05 RX ADMIN — MUPIROCIN: 20 OINTMENT TOPICAL at 09:06

## 2025-06-05 RX ADMIN — DILTIAZEM HYDROCHLORIDE 120 MG: 30 TABLET, FILM COATED ORAL at 06:06

## 2025-06-05 RX ADMIN — AMITRIPTYLINE HYDROCHLORIDE 25 MG: 25 TABLET, FILM COATED ORAL at 09:06

## 2025-06-05 RX ADMIN — LEVALBUTEROL HYDROCHLORIDE 1.25 MG: 0.63 SOLUTION RESPIRATORY (INHALATION) at 07:06

## 2025-06-05 RX ADMIN — ATORVASTATIN CALCIUM 80 MG: 80 TABLET, FILM COATED ORAL at 09:06

## 2025-06-05 RX ADMIN — LEVALBUTEROL HYDROCHLORIDE 1.25 MG: 0.63 SOLUTION RESPIRATORY (INHALATION) at 04:06

## 2025-06-05 RX ADMIN — PANTOPRAZOLE SODIUM 40 MG: 40 TABLET, DELAYED RELEASE ORAL at 09:06

## 2025-06-05 RX ADMIN — LEVALBUTEROL HYDROCHLORIDE 1.25 MG: 0.63 SOLUTION RESPIRATORY (INHALATION) at 12:06

## 2025-06-05 RX ADMIN — Medication 4 ML: at 08:06

## 2025-06-05 RX ADMIN — MELATONIN TAB 3 MG 6 MG: 3 TAB at 09:06

## 2025-06-05 RX ADMIN — ASPIRIN 81 MG: 81 TABLET, COATED ORAL at 09:06

## 2025-06-05 RX ADMIN — TIOTROPIUM BROMIDE INHALATION SPRAY 2 PUFF: 3.12 SPRAY, METERED RESPIRATORY (INHALATION) at 08:06

## 2025-06-05 RX ADMIN — DILTIAZEM HYDROCHLORIDE 120 MG: 30 TABLET, FILM COATED ORAL at 02:06

## 2025-06-05 RX ADMIN — ONDANSETRON 4 MG: 4 TABLET, ORALLY DISINTEGRATING ORAL at 02:06

## 2025-06-05 RX ADMIN — ENOXAPARIN SODIUM 40 MG: 40 INJECTION SUBCUTANEOUS at 05:06

## 2025-06-05 RX ADMIN — ROFLUMILAST 500 MCG: 500 TABLET ORAL at 06:06

## 2025-06-05 RX ADMIN — FLUTICASONE PROPIONATE 100 MCG: 50 SPRAY, METERED NASAL at 09:06

## 2025-06-05 RX ADMIN — Medication 4 ML: at 07:06

## 2025-06-05 RX ADMIN — ONDANSETRON 4 MG: 4 TABLET, ORALLY DISINTEGRATING ORAL at 09:06

## 2025-06-05 RX ADMIN — MIRTAZAPINE 7.5 MG: 7.5 TABLET, FILM COATED ORAL at 09:06

## 2025-06-05 RX ADMIN — CALCIUM CARBONATE (ANTACID) CHEW TAB 500 MG 500 MG: 500 CHEW TAB at 02:06

## 2025-06-05 RX ADMIN — DILTIAZEM HYDROCHLORIDE 120 MG: 30 TABLET, FILM COATED ORAL at 09:06

## 2025-06-05 RX ADMIN — LEVALBUTEROL HYDROCHLORIDE 1.25 MG: 0.63 SOLUTION RESPIRATORY (INHALATION) at 08:06

## 2025-06-05 NOTE — CONSULTS
"  [facility]  Adult Nutrition  Consult Note    SUMMARY     Recommendations    Recommendation/Intervention:   1. Continue heart healthy diet.   2. Continue Boost Plus BID.   3. Monitor and encourage PO intake.   4. Monitor weight changes and labs.    Goals:   Pt to consistently meet >50% assessed needs through PO and supplement intake by RD follow up.  Nutrition Goal Status: new  Communication of RD Recs: other (comment) (Per RD note)    Nutrition Discharge Planning     Nutrition Discharge Planning: Therapeutic diet (comments)  Therapeutic diet (comments): Heart Healthy    Assessment and Plan  PES Statement  Inadequate protein energy intake related to Wound healing as evidenced by Wounds (Inconsistent PO intake)  Status: New    Malnutrition Assessment    Pt does not meet criteria for malnutrition at this time, but is at risk d/t inconsistent PO intake. RD to continue to monitor.    Reason for Assessment    Reason For Assessment: consult  Diagnosis: other (see comments) (Acute respiratory failure with hypoxia)    General Information Comments:   Pt admitted to LTAC 6/3/2025 with acute respiratory failure with hypoxia. Pt is currntly on a heart healthy diet. Reported intake recently has been varied. Intake yesterday was poor, pt states he was "out of it." States appetite has been "so-so." Pt was agreeable to adding Boost Plus Chocolate BID. LBM 6/4/2025. Current wt 211# - pt with some loss over the past year, not significant for malnutrition. RD to continue to monitor.    Past Medical History:   Diagnosis Date    Benign neoplasm of colon 10/01/2013    Bronchitis chronic     CAD (coronary artery disease) 10/31/2013    COPD (chronic obstructive pulmonary disease)     Emphysema of lung     Encounter for preventive health examination 3/21/2018    GERD (gastroesophageal reflux disease)     Hx of colonic polyps     Hypertension     NAFLD (nonalcoholic fatty liver disease) 03/27/2017    SHOLA on CPAP     RLS " "(restless legs syndrome)     Screen for colon cancer 08/30/2013     Nutrition/Diet History    Spiritual, Cultural Beliefs, Jain Practices, Values that Affect Care: no  Food Allergies: NKFA  Factors Affecting Nutritional Intake: decreased appetite    Anthropometrics    Height: 5' 9.02" (175.3 cm)  Height (inches): 69.02 in  Height Method: Stated  Weight: 96 kg (211 lb 10.3 oz)  Weight (lb): 211.64 lb  Weight Method: Bed Scale  Ideal Body Weight (IBW), Male: 160.12 lb  % Ideal Body Weight, Male (lb): 132.18 %  BMI (Calculated): 31.2  BMI Grade: 30 - 34.9- obesity - grade I     Wt Readings from Last 10 Encounters:   06/05/25 96 kg (211 lb 10.3 oz)   05/23/25 96 kg (211 lb 10.3 oz)   05/19/25 86.2 kg (190 lb 0.6 oz)   05/14/25 86.2 kg (190 lb)   05/05/25 95.7 kg (211 lb)   04/15/25 95.8 kg (211 lb 3.2 oz)   04/07/25 92.4 kg (203 lb 11.3 oz)   03/28/25 88 kg (194 lb 0.1 oz)   03/27/25 88.9 kg (196 lb)   03/14/25 89.2 kg (196 lb 10.4 oz)       Lab/Procedures/Meds    Pertinent Labs Reviewed: reviewed     Latest Reference Range & Units Most Recent   Hemoglobin 14.0 - 18.0 gm/dL 12.9 (L)  6/4/25 08:51   (L): Data is abnormally low   Latest Reference Range & Units Most Recent   BUN 8 - 23 mg/dL 39 (H)  6/4/25 03:44   (H): Data is abnormally high   Latest Reference Range & Units Most Recent   Glucose 70 - 110 mg/dL 118 (H)  6/4/25 03:44   Calcium 8.7 - 10.5 mg/dL 8.4 (L)  6/4/25 03:44   Phosphorus Level 2.7 - 4.5 mg/dL 2.6 (L)  6/4/25 03:44   (H): Data is abnormally high  (L): Data is abnormally low   Latest Reference Range & Units Most Recent   Albumin 3.5 - 5.2 g/dL 2.4 (L)  6/4/25 03:44   (L): Data is abnormally low    Pertinent Medications Reviewed: reviewed    Scheduled Meds:   amitriptyline  25 mg Oral QHS    ammonium lactate   Topical (Top) Daily    aspirin  81 mg Oral QAM    atorvastatin  80 mg Oral Daily    diltiaZEM  120 mg Oral Q8H    enoxparin  40 mg Subcutaneous Daily    fluticasone propionate  2 spray Each " Nostril Daily    furosemide  40 mg Oral Daily    guaiFENesin  1,200 mg Oral BID    levalbuterol  1.2495 mg Nebulization Q4H    levETIRAcetam  500 mg Oral BID    mirtazapine  7.5 mg Oral QHS    mupirocin   Nasal BID    pantoprazole  40 mg Oral Daily    roflumilast  500 mcg Oral QAM    sodium chloride 3%  4 mL Nebulization BID    tiotropium bromide  2 puff Inhalation Daily     Continuous Infusions:  PRN Meds:.  Current Facility-Administered Medications:     acetaminophen, 650 mg, Oral, Q8H PRN    albuterol sulfate, 0.6667 mg, Nebulization, Q6H PRN    calcium carbonate, 500 mg, Oral, TID PRN    HYDROcodone-acetaminophen, 1 tablet, Oral, Q6H PRN    hydrOXYzine HCL, 25 mg, Oral, TID PRN    melatonin, 6 mg, Oral, Nightly PRN    naloxone, 0.02 mg, Intravenous, PRN    ondansetron, 4 mg, Oral, Q6H PRN    sodium chloride 0.9%, 10 mL, Intravenous, Q12H PRN    Estimated/Assessed Needs    Weight Used For Calorie Calculations: 72.6 kg (160 lb) (IBW)  Energy Calorie Requirements (kcal): 1814 (25 kcal/kg IBW)  Energy Need Method: Kcal/kg (IBW)  Protein Requirements: 102 g (1.4 g/kg IBW)  Weight Used For Protein Calculations: 72.6 kg (160 lb) (IBW)  Fluid Requirements (mL): 1814  Estimated Fluid Requirement Method: RDA Method  RDA Method (mL): 1814     Nutrition Prescription Ordered    Current Diet Order: Heart Healthy  Oral Nutrition Supplement: Boost Plus Chocolate BID    Evaluation of Received Nutrient/Fluid Intake    Energy Calories Required: not meeting needs  Protein Required: not meeting needs  Fluid Required: meeting needs  % Intake of Estimated Energy Needs: Other: Varied  % Meal Intake: Other: Varied      Nutrition Risk    Level of Risk/Frequency of Follow-up: moderate       Monitor and Evaluation    Monitor and Evaluation: Energy intake, Food and beverage intake, Protein intake, Diet order, Food and nutrition knowledge, Weight, Beliefs and attitudes, Electrolyte and renal panel, Gastrointestinal profile, Glucose/endocrine  profile, Inflammatory profile, Lipid profile, Nutrition focused physical findings, Skin       Nutrition Follow-Up    Weekly

## 2025-06-05 NOTE — PLAN OF CARE
Problem: Breathing Pattern Ineffective  Goal: Effective Breathing Pattern  Intervention: Promote Improved Breathing Pattern  Flowsheets (Taken 6/5/2025 1040)  Airway/Ventilation Management:   airway patency maintained   humidification applied   pulmonary hygiene promoted   calming measures promoted  Breathing Techniques/Airway Clearance: deep/controlled cough encouraged  Head of Bed (HOB) Positioning: HOB elevated     Problem: Airway Clearance Ineffective  Goal: Effective Airway Clearance  Intervention: Promote Airway Secretion Clearance  Flowsheets (Taken 6/5/2025 4363)  Breathing Techniques/Airway Clearance: deep/controlled cough encouraged  Cough And Deep Breathing: done independently per patient

## 2025-06-05 NOTE — CARE UPDATE
06/04/25 1928   Patient Assessment/Suction   Level of Consciousness (AVPU) alert   Respiratory Effort Unlabored   Expansion/Accessory Muscles/Retractions no use of accessory muscles;no retractions;expansion symmetric   All Lung Fields Breath Sounds coarse   Rhythm/Pattern, Respiratory unlabored;pattern regular;depth regular   Cough Frequency infrequent   Cough Type nonproductive   PRE-TX-O2   Device (Oxygen Therapy) high flow nasal cannula   $ Is the patient on High Flow Oxygen? Yes   Flow (L/min) (Oxygen Therapy) 6   SpO2 99 %   Pulse Oximetry Type Continuous   $ Pulse Oximetry - Multiple Charge Pulse Oximetry - Multiple   Pulse (!) 112   Resp (!) 24   Positioning HOB elevated 30 degrees   Aerosol Therapy   $ Aerosol Therapy Charges Aerosol Treatment   Respiratory Treatment Status (SVN) given   Treatment Route (SVN) mask   Patient Position HOB elevated   Post Treatment Assessment (SVN) breath sounds unchanged   Signs of Intolerance (SVN) none   Breath Sounds Post-Respiratory Treatment   Post-treatment Heart Rate (beats/min) 115   Post-treatment Resp Rate (breaths/min) 24   Chest Physiotherapy   $ Chest Physiotherapy Charges Subsequent   Type (CPT) percussion   Method (CPT) manual percussor   Patient Position (CPT) HOB elevated   Post Treatment Assessment (CPT) breath sounds unchanged   Signs of Intolerance (CPT) none   Respiratory Evaluation   $ Care Plan Tech Time 15 min

## 2025-06-05 NOTE — PT/OT/SLP PROGRESS
Physical Therapy Treatment  Co-treatment with OT due to pt's requiring 2 skilled therapists to safely perform mobility and accommodate activity tolerance.   Patient Name:  Jordin Allen Jr.   MRN:  [id number]    Recommendations:     Discharge Recommendations: Moderate Intensity Therapy  Discharge Equipment Recommendations: cane, straight, walker, rolling, shower chair  Barriers to discharge: Inaccessible home    Assessment:     Jordin Allen Jr. is a 79 y.o. male admitted with a medical diagnosis of Acute respiratory failure with hypoxia.  He presents with the following impairments/functional limitations: weakness, impaired endurance, impaired self care skills, impaired functional mobility, gait instability, impaired balance, orthopedic precautions, impaired cardiopulmonary response to activity .    Rehab Prognosis: Good; patient would benefit from acute skilled PT services to address these deficits and reach maximum level of function.    Recent Surgery: * No surgery found *      Plan:     During this hospitalization, patient to be seen 5 x/week to address the identified rehab impairments via gait training, therapeutic activities, therapeutic exercises, neuromuscular re-education and progress toward the following goals:    Plan of Care Expires:       Subjective     Chief Complaint: various; However agreeable to therapy.  Patient/Family Comments/goals: N/A  Pain/Comfort:         Objective:     Communicated with PT prior to session.  Patient found HOB elevated with telemetry, blood pressure cuff, pulse ox (continuous), peripheral IV upon PT entry to room.     General Precautions: Standard, fall, aspiration, droplet  Orthopedic Precautions: spinal precautions  Braces: TLSO  Respiratory Status: Nasal cannula, flow 6 L/min     Functional Mobility:  Bed Mobility:     Supine to Sit: moderate assistance  Sit to Supine: moderate assistance  Transfers:     Bed to Chair: moderate assistance, maximal assistance, and of 2  persons with  no AD  using  Squat Pivot  Toilet Transfer: moderate assistance, maximal assistance, and of 2 persons with  no AD  using  Squat Pivot      AM-PAC 6 CLICK MOBILITY          Treatment & Education:  Patient supine in bed upon entry. Functional mobility as per above. Patient sat EOB for leticia 15 minutes with S. Sit to stand with modA / MaxA x 2.   Toilet transfer and bed to chair transfer as noted.   Patient left HOB elevated with all lines intact, call button in reach, and bed alarm on..    GOALS:   Multidisciplinary Problems       Physical Therapy Goals          Problem: Physical Therapy    Goal Priority Disciplines Outcome Interventions   Physical Therapy Goal     PT, PT/OT Progressing    Description: Goals to be met by: DC     Patient will increase functional independence with mobility by performin. Supine to sit with Avery  2. Sit to supine with Avery  3. Sit to stand transfer with Supervision with RW  4. Gait  x 150 feet with Contact Guard Assistance using Rolling Walker.                          DME Justifications:  No DME recommended requiring DME justifications    Time Tracking:     PT Received On: 25  PT Start Time: 1000     PT Stop Time: 1024  PT Total Time (min): 24 min     Billable Minutes: Total Time 24 min + 15 min    Treatment Type: Treatment  PT/PTA: PTA     Number of PTA visits since last PT visit: 2025

## 2025-06-05 NOTE — CARE UPDATE
06/04/25 1929   PRE-TX-O2   SpO2 99 %   Pulse 82   Resp (!) 23   Temp 97.9 °F (36.6 °C)   BP (!) 151/76

## 2025-06-05 NOTE — PROGRESS NOTES
[facility] Medicine   Progress Note  Room: 212/212   Patient Name: Jordin Allen Jr.  MRN: [id number]  Admit Date: 6/3/2025   Length of Stay:  LOS: 2 days   Attending Physician: Bernardino Guthrie MD  Nurse Practitioner: Ernestine Palomino NP    Date of Service: 06/05/2025    Principal Problem:    Acute respiratory failure with hypoxia    Brief HPI:     Jordin Allen Jr. is a 79 y.o. 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  [place] [facility] [place]25 with complaints of shortness of breath and left foot redness.  Admitted to hospital medicine team for left foot cellulitis and acute hypoxic respiratory failure secondary to pneumonia/COPD exacerbation. CTA chest negative for PE. Emphysematous change with superimposed edema. Nodular focus within the anterior aspect of left upper lobe.  Started on nebs, prednisone, and empiric vanc/cefepime and doxycycline.  Respiratory panel positive for parainfluenza virus.  Course further complicated by sinus tach with PACs/18, for which he was started on diltiazem.  Blood cultures positive for staph, suspected to be contaminant.  Repeat blood cultures returned negative.  He will switch to Augmentin and doxy.  Ongoing episodes of SVT, thought to be due to underlying hypoxia.  He was able to be weaned down to high-flow nasal cannula 15 L.  Discharged to [facility] on 06/03/2025 for rehab, wound care, and O2 weaning.     Interval History    Weaned to 5 L nasal cannula, O2 sats 96%   Reporting per appetite, adding mirtazapine 7.5 mg nightly  Reporting some nausea and indigestion today, starting zofran and Tums prn  Patient discussed during interdisciplinary team meeting today with attending physician, , nutrition, therapy, respiratory, nursing, pharmacist, and wound  care.  D/c plan: 6/21 home with       Subjective:     Review of Systems   Constitutional:  Positive for malaise/fatigue.   Respiratory:  Positive for cough, sputum production and shortness of breath.    Cardiovascular:  Negative for chest pain and leg swelling.   Gastrointestinal:  Positive for heartburn and nausea. Negative for vomiting.   Genitourinary:  Negative for dysuria and urgency.   Musculoskeletal:  Negative for myalgias.   Neurological:  Positive for weakness. Negative for dizziness.   Psychiatric/Behavioral:  Negative for depression. The patient has insomnia.          Objective:     Vital Sign Range  Temp:  [97.7 °F (36.5 °C)-98.3 °F (36.8 °C)]   Pulse:  []   Resp:  [18-39]   BP: (102-161)/(62-80)   SpO2:  [94 %-100 %]     Body mass index is 31.24 kg/m².           Intake/Output Summary (Last 24 hours) at 6/5/2025 0851  Last data filed at 6/5/2025 0850  Gross per 24 hour   Intake 900 ml   Output 2450 ml   Net -1550 ml       Physical Exam  Constitutional:       Appearance: He is ill-appearing.   HENT:      Head: Normocephalic and atraumatic.      Mouth/Throat:      Mouth: Mucous membranes are moist.      Pharynx: Oropharynx is clear.   Eyes:      Extraocular Movements: Extraocular movements intact.      Pupils: Pupils are equal, round, and reactive to light.   Cardiovascular:      Rate and Rhythm: Normal rate. Rhythm irregular.   Pulmonary:      Breath sounds: Wheezing and rhonchi present.   Abdominal:      General: Bowel sounds are normal.      Palpations: Abdomen is soft.   Musculoskeletal:      Right lower leg: Edema present.      Left lower leg: Edema present.   Skin:     General: Skin is warm and dry.   Neurological:      Mental Status: He is alert and oriented to person, place, and time. Mental status is at baseline.   Psychiatric:         Mood and Affect: Affect is flat.         Wounds       Wound 05/19/25 0233 Moisture associated dermatitis Left dorsal Foot (Active)   05/19/25 0233 Foot    Present on Original Admission: Y   Primary Wound Type: Moisture ass   Side: Left   Orientation: dorsal   Wound Approximate Age at First Assessment (Weeks):    Wound Number:    Is this injury device related?: No   Incision Type:    Closure Method:    Wound Description (Comments):    Type:    Additional Comments:    Ankle-Brachial Index:    Pulses:    Removal Indication and Assessment:    Wound Outcome:    Wound Image   06/04/25 1030   Dressing Appearance Open to air 06/04/25 1910   Drainage Amount None 06/04/25 1030   Drainage Characteristics/Odor No odor 06/04/25 1030   Appearance Dry 06/04/25 1030   Red (%), Wound Tissue Color 100 % 06/04/25 1030   Periwound Area [place] 06/04/25 1030   Care Povidone iodine 06/03/25 0800   Number of days: 17            Wound 05/19/25 0231 Moisture associated dermatitis Left lateral Foot (Active)   05/19/25 0231 Foot   Present on Original Admission: Y   Primary Wound Type: Moisture ass   Side: Left   Orientation: lateral   Wound Approximate Age at First Assessment (Weeks):    Wound Number:    Is this injury device related?:    Incision Type:    Closure Method:    Wound Description (Comments):    Type:    Additional Comments:    Ankle-Brachial Index:    Pulses:    Removal Indication and Assessment:    Wound Outcome:    Wound Image    06/04/25 1030   Dressing Appearance Open to air 06/04/25 1910   Drainage Amount None 06/04/25 1030   Drainage Characteristics/Odor No odor 06/04/25 1030   Appearance Dry 06/04/25 1030   Tissue loss description Not applicable 06/04/25 1030   Periwound Area Dry 06/04/25 1030   Wound Edges Approximated 06/04/25 1030   Care Povidone iodine 06/03/25 0800   Dressing Other (comment) 05/26/25 1740   Number of days: 17            Wound 05/19/25 0231 Pressure Injury Right anterior Ankle (Active)   05/19/25 0231 Ankle   Present on Original Admission: Y   Primary Wound Type: Pressure inj   Side: Right   Orientation: anterior   Wound Approximate Age at First  Assessment (Weeks):    Wound Number:    Is this injury device related?:    Incision Type:    Closure Method:    Wound Description (Comments):    Type:    Additional Comments:    Ankle-Brachial Index:    Pulses:    Removal Indication and Assessment:    Wound Outcome:    Wound Image   06/04/25 1030   Pressure Injury Stage 1 06/04/25 1030   Dressing Appearance Open to air 06/04/25 1910   Drainage Amount Scant 06/04/25 1030   Drainage Characteristics/Odor No odor 06/04/25 1030   Appearance Pink 06/04/25 1030   Tissue loss description Partial thickness 06/04/25 1030   Red (%), Wound Tissue Color 100 % 06/04/25 1030   Periwound Area Intact 06/04/25 1030   Wound Length (cm) 0.5 cm 06/04/25 1030   Wound Width (cm) 0.5 cm 06/04/25 1030   Wound Depth (cm) 0.1 cm 06/04/25 1030   Wound Volume (cm^3) 0.013 cm^3 06/04/25 1030   Wound Surface Area (cm^2) 0.2 cm^2 06/04/25 1030   Care Cleansed with: 06/04/25 1030   Dressing Foam 06/03/25 0800   Packing honey packing 06/03/25 0800   Periwound Care Dry periwound area maintained 06/03/25 0800   Dressing Change Due 05/27/25 05/26/25 1740   Number of days: 17            Wound 05/19/25 0233 Moisture associated dermatitis Left anterior Foot (Active)   05/19/25 0233 Foot   Present on Original Admission:    Primary Wound Type: Moisture ass   Side: Left   Orientation: anterior   Wound Approximate Age at First Assessment (Weeks):    Wound Number:    Is this injury device related?:    Incision Type:    Closure Method:    Wound Description (Comments):    Type:    Additional Comments:    Ankle-Brachial Index:    Pulses:    Removal Indication and Assessment:    Wound Outcome:    Wound Image   06/04/25 1030   Dressing Appearance Open to air 06/04/25 1910   Drainage Amount None 06/04/25 1030   Drainage Characteristics/Odor No odor 06/04/25 1030   Appearance Dry 06/04/25 1030   Tissue loss description Not applicable 06/04/25 1030   Red (%), Wound Tissue Color 100 % 06/04/25 1030   Periwound Area  [place] 06/04/25 1030   Wound Edges Undefined 06/04/25 1030   Care Povidone iodine 06/03/25 0800   Dressing Other (comment) 05/26/25 1740   Periwound Care Dry periwound area maintained 06/04/25 1030   Number of days: 17            Wound 06/04/25 1030 Moisture associated dermatitis Right lateral Abdomen (Active)   06/04/25 1030 Abdomen   Present on Original Admission: Y   Primary Wound Type: Moisture ass   Side: Right   Orientation: lateral   Wound Approximate Age at First Assessment (Weeks):    Wound Number:    Is this injury device related?:    Incision Type:    Closure Method:    Wound Description (Comments):    Type:    Additional Comments:    Ankle-Brachial Index:    Pulses:    Removal Indication and Assessment:    Wound Outcome:    Wound Image   06/04/25 1030   Dressing Appearance Open to air 06/04/25 1910   Drainage Amount None 06/04/25 1030   Red (%), Wound Tissue Color 100 % 06/04/25 1030   Periwound Area Moist 06/04/25 1030   Care Wound cleanser 06/04/25 1030   Number of days: 1            Wound 06/04/25 1030 Moisture associated dermatitis Left lateral Abdomen (Active)   06/04/25 1030 Abdomen   Present on Original Admission: Y   Primary Wound Type: Moisture ass   Side: Left   Orientation: lateral   Wound Approximate Age at First Assessment (Weeks):    Wound Number:    Is this injury device related?:    Incision Type:    Closure Method:    Wound Description (Comments):    Type:    Additional Comments:    Ankle-Brachial Index:    Pulses:    Removal Indication and Assessment:    Wound Outcome:    Wound Image   06/04/25 1030   Dressing Appearance Open to air 06/04/25 1910   Drainage Amount None 06/04/25 1030   Appearance Moist 06/04/25 1030   Red (%), Wound Tissue Color 100 % 06/04/25 1030   Periwound Area Moist 06/04/25 1030   Care Wound cleanser 06/04/25 1030   Number of days: 1            Wound 06/04/25 1030 Moisture associated dermatitis Right Groin (Active)   06/04/25 1030 Groin   Present on Original  Admission: Y   Primary Wound Type: Moisture ass   Side: Right   Orientation:    Wound Approximate Age at First Assessment (Weeks):    Wound Number:    Is this injury device related?: No   Incision Type:    Closure Method:    Wound Description (Comments):    Type:    Additional Comments:    Ankle-Brachial Index:    Pulses:    Removal Indication and Assessment:    Wound Outcome:    Wound Image   06/04/25 1030   Dressing Appearance Open to air 06/04/25 1910   Drainage Amount None 06/04/25 1030   Red (%), Wound Tissue Color 100 % 06/04/25 1030   Periwound Area Moist 06/04/25 1030   Care Cleansed with:;Wound cleanser 06/04/25 1030   Number of days: 1            Wound 06/04/25 1030 Moisture associated dermatitis Left Groin (Active)   06/04/25 1030 Groin   Present on Original Admission: Y   Primary Wound Type: Moisture ass   Side: Left   Orientation:    Wound Approximate Age at First Assessment (Weeks):    Wound Number:    Is this injury device related?:    Incision Type:    Closure Method:    Wound Description (Comments):    Type:    Additional Comments:    Ankle-Brachial Index:    Pulses:    Removal Indication and Assessment:    Wound Outcome:    Wound Image   06/04/25 1030   Dressing Appearance Open to air 06/04/25 1910   Drainage Amount None 06/04/25 1030   Red (%), Wound Tissue Color 100 % 06/04/25 1030   Periwound Area Moist 06/04/25 1030   Care Cleansed with:;Wound cleanser 06/04/25 1030   Number of days: 1            Wound 06/04/25 1030 Moisture associated dermatitis Sacral spine (Active)   06/04/25 1030 Sacral spine   Present on Original Admission: Y   Primary Wound Type: Moisture ass   Side:    Orientation:    Wound Approximate Age at First Assessment (Weeks):    Wound Number:    Is this injury device related?:    Incision Type:    Closure Method:    Wound Description (Comments):    Type:    Additional Comments:    Ankle-Brachial Index:    Pulses:    Removal Indication and Assessment:    Wound Outcome:    Wound  "Image   06/04/25 1030   Dressing Appearance Open to air 06/04/25 1910   Drainage Amount None 06/04/25 1030   Drainage Characteristics/Odor No odor 06/04/25 1030   Appearance Moist 06/04/25 1030   Periwound Area Moist 06/04/25 1030   Care Wound cleanser 06/04/25 1030   Number of days: 1         Wounds consistently followed by [facility] NP Sheree Tao     Labs:  Recent Labs   Lab 06/02/25 0438 06/03/25  0213 06/04/25  0851   WBC 11.04 12.52 12.92*   HGB 13.1* 12.5* 12.9*   HCT 42.2 40.6 41.2    175 144*     Recent Labs   Lab 06/02/25 0438 06/03/25  0213 06/04/25  0344    142 142   K 4.1 4.3 4.0    103 104   CO2 29 29 28   BUN 42* 40* 39*   CREATININE 0.9 1.0 1.0   * 136* 118*   CALCIUM 8.6* 8.6* 8.4*   MG 2.0 1.9 1.8   PHOS 3.0 2.7 2.6*     Recent Labs   Lab 06/02/25 0438 06/03/25  0213 06/04/25  0344   ALKPHOS 98 92 99   ALT 58* 54* 53*   AST 17 19 25   ALBUMIN 2.5* 2.5* 2.4*   PROT 5.3* 5.3* 5.2*   BILITOT 0.5 0.6 0.5     No results for input(s): "POCTGLUCOSE" in the last 72 hours.    Meds Scheduled:   amitriptyline  25 mg Oral QHS    ammonium lactate   Topical (Top) Daily    aspirin  81 mg Oral QAM    atorvastatin  80 mg Oral Daily    diltiaZEM  120 mg Oral Q8H    enoxparin  40 mg Subcutaneous Daily    fluticasone propionate  2 spray Each Nostril Daily    furosemide  40 mg Oral Daily    guaiFENesin  1,200 mg Oral BID    levalbuterol  1.2495 mg Nebulization Q4H    levETIRAcetam  500 mg Oral BID    mupirocin   Nasal BID    pantoprazole  40 mg Oral Daily    roflumilast  500 mcg Oral QAM    sodium chloride 3%  4 mL Nebulization BID    tiotropium bromide  2 puff Inhalation Daily       Current Inpatient Problem List:  Active Hospital Problems    Diagnosis  POA    *Acute respiratory failure with hypoxia [J96.01]  Yes    Parainfluenza virus infection [B34.8]  Yes    Pneumonia [J18.9]  Yes    Cellulitis [L03.90]  Yes    TIA (transient ischemic attack) [G45.9]  Yes     ASA and statin      " Metastasis to brain [C79.31]  Yes    Adenocarcinoma, lung, 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       CAD (coronary artery disease) [I25.10]  Yes     Chronic     -LHC (10/31/13) for stemi: pLAD 100%, Cx with Li's, mRCA 40%, LVEF 55%,. LVEDP 15   -Xience 3.0 x 33 mm to pLAD, post dilated with 3.5 NC and 4.0 NC.   -TTE (8/14/13): LVEF 55%, grade I diastolic dysfunction      HTN (hypertension), benign [I10]  Yes    SHOLA (obstructive sleep apnea) [G47.33]  Yes     CPAP at night      GERD (gastroesophageal reflux disease) [K21.9]  Yes     Continue PPI        Resolved Hospital Problems   No resolved problems to display.         Assessment / Plan:     Acute respiratory failure with hypoxia  COPD with acute exacerbation  Parainfluenza virus infection  Pneumonia   On Symbicort Spiriva and Daliresp at home. Follows up with pulmonology outpatient.   Completed 7 day course of Augmentin and doxycycline on 06/01  Continue Daliresp 500mcg daily  Continue Xopenex p.r.n.   Completed course of prednisone  Continue doxycycline 100 mg b.i.d.  Continue weaning high-flow nasal cannula  Continue furosemide 40 mg daily  Continue guaifenesin 1200 mg b.i.d.  Weaned to 5 L nasal cannula, O2 sats 96%      Cellulitis   Wound followed and managed by consistent, contracted [facility] NP  Completed 7 day course of doxy and Augmentin  Will need follow up with Podiatry after discharge     Restless leg syndrome   Continue amitriptyline 25 mg nightly     Adenocarcinoma, lung, left   Metastasis to brain  Completed treatment with chemo/XRT. Brain XRT for lesionsx2. off immunotherapy since 12/24  suspect the LLL area was consolidation of prior RXT changes and not malignancy and is nearly resolved.  CT on presentation with new SANJAY nodule, need op follow up with pulm  Continue prophylactic Keppra  500 mg b.i.d.    Compression fracture of L1 vertebrae with routine healing   TLSO brace when out of bed  F/u with Dr. Hopkins     Dyslipidemia  TIA  CAD   Continue aspirin   Continue atorvastatin 80 mg daily     Tachycardia   Episodes of SVT while at the hospital   Evaluated by Cardiology   Likely worsened by underlying hypoxia   Supplemental O2   Continue diltiazem 120 mg q.8 hours  Cardiac monitoring     SHOLA   Continue CPAP nightly     GERD  Continue PPI daily     Left lateral foot wound   Right anterior ankle wound   Traumatic left dorsal foot wound   Left anterior foot venous ulcer   Skin tear right distal arm   Pressure injury nose  Wound followed and managed by consistent, contracted [facility] NP    Advance Care Planning, 06/05/2025  This was a voluntary visit and the option to decline counseling was given  Length of discussion:  16 minutes  Life limiting problem:  Respiratory failure, adenocarcinoma  Topics discussed:  Code status  Outcome of discussion:  Patient would like to remain a full code.  In the event that he is not able to make his own decisions, he would like to defer decision making to his daughter  Decision Maker:Jordin Allen     Psychiatric Assessment  Patient Health Questionnaire-9 (PHQ-9)    Date:  06/05/2025    1. Little interest or pleasure in doing things? yes,  Nearly every day           = 3    2. Feeling down, depressed, or hopeless? yes, More than half of days  = 2    3. Trouble falling or staying asleep, or sleeping too much? yes, Nearly every day           = 3    4. Feeling tired or having little energy? yes, Nearly every day           = 3    5. Poor appetite or overeating? yes, More than half of days  = 2    6. Feeling bad about yourself- or that you are a failure or have let yourself or your family down? no, Not at all                       = 0    7. Trouble concentrating on things, such as reading the newspaper or watching TV? yes, Several days                = 1    8. Moving or  speaking so slowly that other people could have noticed? Or the opposite- being so fidgety or restless that you have been moving around a lot more than usual? yes, Several days                = 1    9. Thoughts that you would be better off dead or of hurting yourself in some way? no, Not at all                       = 0    Total Score: 15    How difficult have these problems made it for you to do your work, take care of things at home, or get along with other people? yes      Screening is Positive       Diagnosis and Treatment Plan:  Moderate MDD   Continue amitriptyline 25 mg nightly   Will start mirtazapine 7.5 mg nightly, which will hopefully help with his appetite as well        Current Medications:   Home Psychiatric Meds:   Psychotherapeutics (From admission, onward)      Start     Stop Route Frequency Ordered    06/05/25 2100  mirtazapine tablet 7.5 mg         -- Oral Nightly 06/05/25 0854    06/03/25 2100  amitriptyline tablet 25 mg         -- Oral Nightly 06/03/25 1625                     Diet:  Cardiac   GI Ppx:  PPI   DVT Ppx:  Enoxaparin     Lines:  PIV   Drains:  None   Airways:n/a   Wounds:  Multiple    Goals of Care:   Restorative,  Treat infection  Optimize nutrition  Wound healing  Muscle strengthening  Restoration of ADL's  Improve mobility    Anticipated Disposition:     6/21 home with     Follow up appointments:   Future Appointments   Date Time Provider Department Center   6/24/2025  7:15 AM [facility]MRI[facility] MRI [facility]   6/24/2025  9:30 AM Steven Campos MD [facility] NEUROSC [place]   6/24/2025  2:00 PM Bienvenido Henson MD [facility] RADONC3 [place]   7/1/2025 10:30 AM [facility] OI-XRAY [facility] XRAY [facility]   7/1/2025 11:30 AM Bolivar Sr PA [facility] NEUROS8 [facility]   7/8/2025  9:15 AM MEEKS, VISUAL FIELDS [facility] OPHTHAL [facility]   7/8/2025 10:00 AM Cara Looney MD [facility] OPHTHAL [facility]   7/15/2025 10:00 AM Garrett Lloyd DPM [facility] POD [facility]   7/23/2025  10:15 AM CV [facility] ECHO [facility] [place]   8/15/2025  9:20 AM Bienvenido Ward MD [facility] PULMON [place]   8/27/2025  9:00 AM Guido Trejo MD [facility] ENT [place]   9/8/2025 11:10 AM Yan Palmer MD [facility] DERM [place] Palomino, NP  Hospital Medicine [facility]    I have spent 54 minutes reviewing patient records, examining, and  of the patient/ patient's family with greater than 50% of the time spent with direct patient care and coordination.

## 2025-06-05 NOTE — PLAN OF CARE
..[facility]  Interdisciplinary Team Conference        Jordin Allen Jr.    Conference Date: 6/5/2025  Admit Date: 6/3/2025       Active Hospital Problems    Diagnosis    *Acute respiratory failure with hypoxia    Parainfluenza virus infection    Pneumonia    Cellulitis    TIA (transient ischemic attack)     ASA and statin      Metastasis to brain    Adenocarcinoma, lung, left    Dyslipidemia    COPD with acute exacerbation     Taking symbicort and spiriva and daliresp  Peak flow 270 l/min In April his Fev-1: 1.33 liters 47%.       CAD (coronary artery disease)     -[facility] (10/31/13) for stemi: pLAD 100%, Cx with Li's, mRCA 40%, LVEF 55%,. LVEDP 15   -Xience 3.0 x 33 mm to pLAD, post dilated with 3.5 NC and 4.0 NC.   -TTE (8/14/13): LVEF 55%, grade I diastolic dysfunction      HTN (hypertension), benign    SHOLA (obstructive sleep apnea)     CPAP at night      GERD (gastroesophageal reflux disease)     Continue PPI          Code Status: Full Code  Advance Directive  (If Adv Dir status is received, view document under Adv Dir in header or Chart Review Media tab): Patient does not have Advance Directive, declines information.        Power of /Surrogate Decision Maker: N/A    LAB/TEST/TREATMENTS:    POCT Glucose   Date Value Ref Range Status   05/16/2025 98 70 - 110 mg/dL Final   05/16/2025 83 70 - 110 mg/dL Final   05/15/2025 131 (H) 70 - 110 mg/dL Final      Lab Results   Component Value Date    INR 1.0 02/07/2025    INR 1.0 02/07/2025    INR 1.0 02/07/2025     Lab Results   Component Value Date    BUN 39 (H) 06/04/2025    BUN 40 (H) 06/03/2025    BUN 42 (H) 06/02/2025     Lab Results   Component Value Date    PH 7.408 05/26/2025    PCO2 45.7 (H) 05/26/2025    PO2 65 (L) 05/26/2025    HCO3 28.8 (H) 05/26/2025     Lab Results   Component Value Date    WBC 12.92 (H) 06/04/2025    WBC  06/04/2025      Comment:      Do not report results. Specimen is clotted. Called to Vince Sarmiento  RN    This is a corrected result. Previous result was 9.23 K/uL on 6/4/2025 at 0627 CDT.    WBC 12.52 06/03/2025     Susceptibility data from last 90 days.  Collected Specimen Info Organism Amp/Sulbactam Ampicillin Cefazolin CEFEPIME ETEST Ceftriaxone Ciprofloxacin Ertapenem Gentamicin LEVOFLOXACIN ETEST Meropenem Pip/Tazo Tobramycin Trimeth/Sulfa   05/27/25 Respiratory from Sputum Candida albicans                05/25/25 Respiratory from Sputum, Expectorated Candida albicans                05/24/25 Respiratory from Sputum, Expectorated Candida albicans                05/22/25 Blood from Peripheral, Hand, Right Coagulase-negative Staphylococcus species                05/05/25 Abscess from Groin Escherichia coli  R  R  R  S  S  S  S  S  S  S  S  S  R   05/05/25 Abscess from Groin Bacteroides ovatus                         Dialysis: N/A    Provider Comments/Recommendations: chronic sick, brain mets, copd, admitted pneumonia and copd, completed abx and steroids, oxygen weaning continues    DIAGNOSTIC TEST    Radiology (Last 168 hours)               06/03 1136 Cardiac monitoring strips     06/03 0807 Cardiac monitoring strips     06/03 0807 Cardiac monitoring strips     06/03 0807 Cardiac monitoring strips     06/02 2239 Cardiac monitoring strips     06/02 1857 Cardiac monitoring strips     06/02 0743 Cardiac monitoring strips     06/01 2040 Cardiac monitoring strips     06/01 1716 X-Ray Chest AP Portable       06/01 1054 Cardiac monitoring strips     06/01 0056 Cardiac monitoring strips     05/31 2218 Cardiac monitoring strips     05/31 2216 Cardiac monitoring strips     05/31 1111 Cardiac monitoring strips     05/30 2241 Cardiac monitoring strips     05/30 0128 Cardiac monitoring strips              X-Ray Chest AP Portable  Narrative: EXAMINATION:  XR CHEST AP PORTABLE    CLINICAL HISTORY:  palpitations;    TECHNIQUE:  Single frontal view of the chest was performed.    COMPARISON:  05/26/2025.    FINDINGS:  The lungs  are well expanded and clear. No focal opacities are seen. The pleural spaces are clear. The cardiac silhouette is unremarkable. The visualized osseous structures are unremarkable.  Impression: No acute abnormality.    Electronically signed by: Popeye Mckeon  Date:    06/01/2025  Time:    22:33       NURSING:    Cognitive/Neuro/Behavioral WDL: WDL  Level of Consciousness (AVPU): alert  Arousal Level: opens eyes spontaneously  Orientation: oriented x 4  Speech: clear/fluent, follows commands, nods/gestures appropriately    Fall Risk Score: 18  History Of Fall (W/I 3 Mos): N  Fall this admission: no    Restraints:         Infections:  No active infections  No active isolations      Telemetry: yes     Patient currently receiving pharmacological/non pharmacological interventions for pain: Yes     Urinary Incontinence: No  Bowel Incontinence: Yes         Peripheral IV - Single Lumen 06/04/25 1502 20 G 1 1/4 in Posterior;Right Wrist (Active)   Site Assessment Clean;Dry;Intact 06/05/25 0355   Line Securement Device Secured with sutureless device 06/04/25 1601   Extremity Assessment Distal to IV No abnormal discoloration 06/04/25 1601   Line Status Capped;Flushed 06/05/25 0355   Dressing Status Clean;Dry;Intact 06/05/25 0355   Dressing Intervention Integrity maintained 06/05/25 0355   Dressing Change Due 06/11/25 06/04/25 1601   Site Change Due 06/11/25 06/04/25 1601   Reason Not Rotated Not due 06/04/25 1601   Number of days: 0       Comments/Recommendations:  bm 6/4, piv, no issues at this time    Wound Care:             Wound 05/19/25 0233 Moisture associated dermatitis Left dorsal Foot (Active)   05/19/25 0233 Foot   Present on Original Admission: Y   Primary Wound Type: Moisture ass   Side: Left   Orientation: dorsal   Wound Approximate Age at First Assessment (Weeks):    Wound Number:    Is this injury device related?: No   Incision Type:    Closure Method:    Wound Description (Comments):    Type:    Additional  Comments:    Ankle-Brachial Index:    Pulses:    Removal Indication and Assessment:    Wound Outcome:    Wound Image   06/04/25 1030   Dressing Appearance Open to air 06/04/25 1910   Drainage Amount None 06/04/25 1030   Drainage Characteristics/Odor No odor 06/04/25 1030   Appearance Dry 06/04/25 1030   Red (%), Wound Tissue Color 100 % 06/04/25 1030   Periwound Area [place] 06/04/25 1030   Care Povidone iodine 06/03/25 0800   Number of days: 17            Wound 05/19/25 0231 Moisture associated dermatitis Left lateral Foot (Active)   05/19/25 0231 Foot   Present on Original Admission: Y   Primary Wound Type: Moisture ass   Side: Left   Orientation: lateral   Wound Approximate Age at First Assessment (Weeks):    Wound Number:    Is this injury device related?:    Incision Type:    Closure Method:    Wound Description (Comments):    Type:    Additional Comments:    Ankle-Brachial Index:    Pulses:    Removal Indication and Assessment:    Wound Outcome:    Wound Image     06/04/25 1030   Dressing Appearance Open to air 06/04/25 1910   Drainage Amount None 06/04/25 1030   Drainage Characteristics/Odor No odor 06/04/25 1030   Appearance Dry 06/04/25 1030   Tissue loss description Not applicable 06/04/25 1030   Periwound Area Dry 06/04/25 1030   Wound Edges Approximated 06/04/25 1030   Care Povidone iodine 06/03/25 0800   Dressing Other (comment) 05/26/25 1740   Number of days: 17            Wound 05/19/25 0231 Pressure Injury Right anterior Ankle (Active)   05/19/25 0231 Ankle   Present on Original Admission: Y   Primary Wound Type: Pressure inj   Side: Right   Orientation: anterior   Wound Approximate Age at First Assessment (Weeks):    Wound Number:    Is this injury device related?:    Incision Type:    Closure Method:    Wound Description (Comments):    Type:    Additional Comments:    Ankle-Brachial Index:    Pulses:    Removal Indication and Assessment:    Wound Outcome:    Wound Image   06/04/25 1030   Pressure  Injury Stage 1 06/04/25 1030   Dressing Appearance Open to air 06/04/25 1910   Drainage Amount Scant 06/04/25 1030   Drainage Characteristics/Odor No odor 06/04/25 1030   Appearance Pink 06/04/25 1030   Tissue loss description Partial thickness 06/04/25 1030   Red (%), Wound Tissue Color 100 % 06/04/25 1030   Periwound Area Intact 06/04/25 1030   Wound Length (cm) 0.5 cm 06/04/25 1030   Wound Width (cm) 0.5 cm 06/04/25 1030   Wound Depth (cm) 0.1 cm 06/04/25 1030   Wound Volume (cm^3) 0.013 cm^3 06/04/25 1030   Wound Surface Area (cm^2) 0.2 cm^2 06/04/25 1030   Care Cleansed with: 06/04/25 1030   Dressing Foam 06/03/25 0800   Packing honey packing 06/03/25 0800   Periwound Care Dry periwound area maintained 06/03/25 0800   Dressing Change Due 05/27/25 05/26/25 1740   Number of days: 17            Wound 05/19/25 0233 Moisture associated dermatitis Left anterior Foot (Active)   05/19/25 0233 Foot   Present on Original Admission:    Primary Wound Type: Moisture ass   Side: Left   Orientation: anterior   Wound Approximate Age at First Assessment (Weeks):    Wound Number:    Is this injury device related?:    Incision Type:    Closure Method:    Wound Description (Comments):    Type:    Additional Comments:    Ankle-Brachial Index:    Pulses:    Removal Indication and Assessment:    Wound Outcome:    Wound Image   06/04/25 1030   Dressing Appearance Open to air 06/04/25 1910   Drainage Amount None 06/04/25 1030   Drainage Characteristics/Odor No odor 06/04/25 1030   Appearance Dry 06/04/25 1030   Tissue loss description Not applicable 06/04/25 1030   Red (%), Wound Tissue Color 100 % 06/04/25 1030   Periwound Area [place] 06/04/25 1030   Wound Edges Undefined 06/04/25 1030   Care Povidone iodine 06/03/25 0800   Dressing Other (comment) 05/26/25 1740   Periwound Care Dry periwound area maintained 06/04/25 1030   Number of days: 17            Wound 06/04/25 1030 Moisture associated dermatitis Right lateral Abdomen (Active)    06/04/25 1030 Abdomen   Present on Original Admission: Y   Primary Wound Type: Moisture ass   Side: Right   Orientation: lateral   Wound Approximate Age at First Assessment (Weeks):    Wound Number:    Is this injury device related?:    Incision Type:    Closure Method:    Wound Description (Comments):    Type:    Additional Comments:    Ankle-Brachial Index:    Pulses:    Removal Indication and Assessment:    Wound Outcome:    Wound Image   06/04/25 1030   Dressing Appearance Open to air 06/04/25 1910   Drainage Amount None 06/04/25 1030   Red (%), Wound Tissue Color 100 % 06/04/25 1030   Periwound Area Moist 06/04/25 1030   Care Wound cleanser 06/04/25 1030   Number of days: 1            Wound 06/04/25 1030 Moisture associated dermatitis Left lateral Abdomen (Active)   06/04/25 1030 Abdomen   Present on Original Admission: Y   Primary Wound Type: Moisture ass   Side: Left   Orientation: lateral   Wound Approximate Age at First Assessment (Weeks):    Wound Number:    Is this injury device related?:    Incision Type:    Closure Method:    Wound Description (Comments):    Type:    Additional Comments:    Ankle-Brachial Index:    Pulses:    Removal Indication and Assessment:    Wound Outcome:    Wound Image   06/04/25 1030   Dressing Appearance Open to air 06/04/25 1910   Drainage Amount None 06/04/25 1030   Appearance Moist 06/04/25 1030   Red (%), Wound Tissue Color 100 % 06/04/25 1030   Periwound Area Moist 06/04/25 1030   Care Wound cleanser 06/04/25 1030   Number of days: 1            Wound 06/04/25 1030 Moisture associated dermatitis Right Groin (Active)   06/04/25 1030 Groin   Present on Original Admission: Y   Primary Wound Type: Moisture ass   Side: Right   Orientation:    Wound Approximate Age at First Assessment (Weeks):    Wound Number:    Is this injury device related?: No   Incision Type:    Closure Method:    Wound Description (Comments):    Type:    Additional Comments:    Ankle-Brachial Index:     Pulses:    Removal Indication and Assessment:    Wound Outcome:    Wound Image   06/04/25 1030   Dressing Appearance Open to air 06/04/25 1910   Drainage Amount None 06/04/25 1030   Red (%), Wound Tissue Color 100 % 06/04/25 1030   Periwound Area Moist 06/04/25 1030   Care Cleansed with:;Wound cleanser 06/04/25 1030   Number of days: 1            Wound 06/04/25 1030 Moisture associated dermatitis Left Groin (Active)   06/04/25 1030 Groin   Present on Original Admission: Y   Primary Wound Type: Moisture ass   Side: Left   Orientation:    Wound Approximate Age at First Assessment (Weeks):    Wound Number:    Is this injury device related?:    Incision Type:    Closure Method:    Wound Description (Comments):    Type:    Additional Comments:    Ankle-Brachial Index:    Pulses:    Removal Indication and Assessment:    Wound Outcome:    Wound Image   06/04/25 1030   Dressing Appearance Open to air 06/04/25 1910   Drainage Amount None 06/04/25 1030   Red (%), Wound Tissue Color 100 % 06/04/25 1030   Periwound Area Moist 06/04/25 1030   Care Cleansed with:;Wound cleanser 06/04/25 1030   Number of days: 1            Wound 06/04/25 1030 Moisture associated dermatitis Sacral spine (Active)   06/04/25 1030 Sacral spine   Present on Original Admission: Y   Primary Wound Type: Moisture ass   Side:    Orientation:    Wound Approximate Age at First Assessment (Weeks):    Wound Number:    Is this injury device related?:    Incision Type:    Closure Method:    Wound Description (Comments):    Type:    Additional Comments:    Ankle-Brachial Index:    Pulses:    Removal Indication and Assessment:    Wound Outcome:    Wound Image   06/04/25 1030   Dressing Appearance Open to air 06/04/25 1910   Drainage Amount None 06/04/25 1030   Drainage Characteristics/Odor No odor 06/04/25 1030   Appearance Moist 06/04/25 1030   Periwound Area Moist 06/04/25 1030   Care Wound cleanser 06/04/25 1030   Number of days: 1        Anjum Score: 16      Skin Interventions: Device Skin Pressure Protection: absorbent pad utilized/changed, pressure points protected, skin-to-skin areas padded, tubing/devices free from skin contact  Pressure Reduction Devices: heel offloading device utilized, pressure-redistributing mattress utilized, specialty bed utilized  Pressure Reduction Techniques: frequent weight shift encouraged, heels elevated off bed, positioned off wounds     Comments/Recommendations:  masd to sacrum, rt and lt groin, buttocks & rt and lt abd, dermatitis to lt lateral & anterior foot, stage 1 rt ankle    Respiratory:       Flow (L/min) (Oxygen Therapy): 5          Vent Weaning: N/A    Comments/Recommendations:  O2 5 L NC weaning, receiving treatments, refusing to wear cpap home and hospital, bipap since admission    Nutrition:    Dietary Orders (From admission, onward)       Start     Ordered    06/03/25 1626  Diet Heart Healthy  Diet effective now         06/03/25 1625                     Parenteral Nutrition: N/A    Wt Readings from Last 1 Encounters:   06/05/25 0600 96 kg (211 lb 10.3 oz)   06/04/25 0800 96 kg (211 lb 10.3 oz)   06/03/25 1629 96 kg (211 lb 10.3 oz)   06/03/25 1555 86.8 kg (191 lb 5.8 oz)       Comments/Recommendations: heart healthy diet intake varies decreased and may increase, wt 212 lbs    Pharmacy:        amitriptyline  25 mg Oral QHS    ammonium lactate   Topical (Top) Daily    aspirin  81 mg Oral QAM    atorvastatin  80 mg Oral Daily    diltiaZEM  120 mg Oral Q8H    enoxparin  40 mg Subcutaneous Daily    fluticasone propionate  2 spray Each Nostril Daily    furosemide  40 mg Oral Daily    guaiFENesin  1,200 mg Oral BID    levalbuterol  1.2495 mg Nebulization Q4H    levETIRAcetam  500 mg Oral BID    mirtazapine  7.5 mg Oral QHS    mupirocin   Nasal BID    pantoprazole  40 mg Oral Daily    roflumilast  500 mcg Oral QAM    sodium chloride 3%  4 mL Nebulization BID    tiotropium bromide  2 puff Inhalation Daily      Antibiotics  (From admission, onward)      Start     Stop Route Frequency Ordered    25 2100  mupirocin 2 % ointment         25 Nasl 2 times daily 25 172           Antivirals (From admission, onward)      None             Current Facility-Administered Medications:     acetaminophen, 650 mg, Oral, Q8H PRN    albuterol sulfate, 0.6667 mg, Nebulization, Q6H PRN    HYDROcodone-acetaminophen, 1 tablet, Oral, Q6H PRN    hydrOXYzine HCL, 25 mg, Oral, TID PRN    melatonin, 6 mg, Oral, Nightly PRN    naloxone, 0.02 mg, Intravenous, PRN    sodium chloride 0.9%, 10 mL, Intravenous, Q12H PRN     Comments/Recommendations: no abx or issues    Physical Therapy:    Multidisciplinary Problems       Physical Therapy Goals          Problem: Physical Therapy    Goal Priority Disciplines Outcome Interventions   Physical Therapy Goal     PT, PT/OT Progressing    Description: Goals to be met by: DC     Patient will increase functional independence with mobility by performin. Supine to sit with [place]  2. Sit to supine with [place]  3. Sit to stand transfer with Supervision with RW  4. Gait  x 150 feet with Contact Guard Assistance using Rolling Walker.                            Comments/Recommendations:  mod assist bed mobility, sit to stand mod assist x2 hand held assist, edge of bed 15 min supervision    Occupational Therapy:    Multidisciplinary Problems       Occupational Therapy Goals          Problem: Occupational Therapy    Goal Priority Disciplines Outcome Interventions   Occupational Therapy Goal     OT, PT/OT Progressing    Description: Goals to be met by: 7/3/2025     Patient will increase functional independence with ADLs by performing:    UE Dressing with Set-up Assistance.  LE Dressing with SBA using appropriate AE as needed.  Grooming while seated at sink with Set-up Assistance.  Toileting from 3-in-1 commode over toilet with Stand-by Assistance for hygiene and clothing management.   Supine to  sit with Mod I.   Step transfer with Stand-by Assistance with RW.  Toilet transfer to 3-in-1 commode over toilet with Stand-by Assistance with RW.                         Comments/Recommendations:  max assist upper body dresssing    Speech Therapy:    Multidisciplinary Problems       SLP Goals       Not on file                     Comments/Recommendations:  N/A    Discharge Planning:                                        Future Appointments   Date Time Provider Department Center   6/24/2025  7:15 AM N[facility] OIC-MRI2 [facility]   6/24/2025  9:30 AM Steven Campos MD [facility] NEUROSC [place]   6/24/2025  2:00 PM Bienvenido Henson MD [facility] RADONC3 [place]   7/1/2025 10:30 AM [facility] OIC-XRAY [facility] XRAY [facility]   7/1/2025 11:30 AM Bolivar Sr PA [facility] NEUROS8 [address]   7/8/2025  9:15 AM MEEKS, VISUAL FIELDS [facility] OPHTHAL [address]   7/8/2025 10:00 AM Cara Looney MD N[facility] OPHTHAL [address]   7/15/2025 10:00 AM Garrett Lloyd DPM [facility] POD [address]   7/23/2025 10:15 AM CV [facility] ECHO [facility] [place]   8/15/2025  9:20 AM Bienvenido Ward MD [facility] PULMON [place]   8/27/2025  9:00 AM Guido Trejo MD [facility] ENT [address]   9/8/2025 11:10 AM Yan Palmer MD [facility] DERM [address]         Expected Discharge Date:  6/21/2025    Comments/Recommendations:  6/21 IPR VS SNF VS NH VS Home w/ HH - depends on progress pt makes per family      Family Conference:    No    Attendees Present:    {Gabby Wilson, RN CM Kathryn Abadie, RN CM Erika Plaisance, RN DON Kristin Todd, SALO Hager, CCC-SLP  Sonny Velez, PTA   Melissa Villafana, RPH  Freida Guerrero, RD  Bernardino Guthrei, MD Ernestine Palomino, NP  Shayy Christy, RRT  Kelly PETERS Wound care  Jordin ALLISON       Continued/Extended Stay Review:    Management of at least one of the following complex respiratory conditions:  Other acute respiratory failure with hypoxia    Must meet at least three of the  following concomitant treatments/intervention daily unless notes: (excludes PO meds unless notes)  Cardiac Monitoring  Complex wound care  Other oxygen weaning

## 2025-06-05 NOTE — PT/OT/SLP PROGRESS
Occupational Therapy   Treatment    Co-treatment with PT due to pt's requiring 2 skilled therapists to safely perform mobility and to accommodate activity tolerance      Name: Jordin Allen Jr.  MRN: [id number]  Admitting Diagnosis:  Acute respiratory failure with hypoxia       Recommendations:     Discharge Recommendations: Moderate Intensity Therapy  Discharge Equipment Recommendations:  other (see comments) (TBD pending progress)  Barriers to discharge:       Assessment:     Jordin Allen Jr. is a 79 y.o. male with a medical diagnosis of Acute respiratory failure with hypoxia.  He presents with debility. Performance deficits affecting function are weakness, impaired endurance, impaired self care skills, impaired functional mobility, gait instability, impaired balance, orthopedic precautions, impaired cardiopulmonary response to activity.     Rehab Prognosis:  Fair; patient would benefit from acute skilled OT services to address these deficits and reach maximum level of function.       Plan:     Patient to be seen 5 x/week to address the above listed problems via self-care/home management, therapeutic activities, therapeutic exercises  Plan of Care Expires: 07/03/25  Plan of Care Reviewed with: patient    Subjective     Chief Complaint: various  Patient/Family Comments/goals: no family in room  Pain/Comfort:       Objective:     Communicated with: patient prior to session.  Patient found HOB elevated with   upon OT entry to room.    General Precautions: Standard, fall, droplet, aspiration    Orthopedic Precautions:spinal precautions  Braces: TLSO  Respiratory Status: High flow, flow 6 L/min, concentration 99%     Occupational Performance:     Bed Mobility:    Patient completed Rolling/Turning to Left with  moderate assistance and 2 persons  Patient completed Rolling/Turning to Right with moderate assistance and 2 persons  Patient completed Supine to Sit with moderate assistance and maximal assistance      Functional Mobility/Transfers:  Patient completed Sit <> Stand Transfer with moderate assistance and of 2 persons  with  hand-held assist   Patient completed Bed <> Chair Transfer using Stand Pivot technique with moderate assistance, maximal assistance, and of 2 persons with hand-held assist  Patient completed Bed <> Toilet transfer using Stand Pivot technique with Moderate assistance, maximal assistance, and 2 persons with hand-held assist    Activities of Daily Living:  Feeding:  supervision set up assist  Upper Body Dressing: total assistance and of 2 persons donning TLSO seated EOB  Toileting: total assistance on BSC total assist for hygiene      [facility] 6 Click ADL: 14    Treatment & Education:  Patient requesting to get into bedside chair.   Patient bed mobility to EOB as noted above.  While sitting EOB patient requested urinal and then bed pan however did not want to lay back down in bed.   Patient transferred to [facility] as noted above.   When patient finished with the BSC patient transferred to bedside chair.   Remained up in chair >2 hours with ROHO cushion and all needs within reach.   CNA notified    Patient left up in chair with all lines intact and call button in reach    GOALS:   Multidisciplinary Problems       Occupational Therapy Goals          Problem: Occupational Therapy    Goal Priority Disciplines Outcome Interventions   Occupational Therapy Goal     OT, PT/OT Progressing    Description: Goals to be met by: 7/3/2025     Patient will increase functional independence with ADLs by performing:    UE Dressing with Set-up Assistance.  LE Dressing with SBA using appropriate AE as needed.  Grooming while seated at sink with Set-up Assistance.  Toileting from 3-in-1 commode over toilet with Stand-by Assistance for hygiene and clothing management.   Supine to sit with Mod I.   Step transfer with Stand-by Assistance with RW.  Toilet transfer to 3-in-1 commode over toilet with Stand-by Assistance with RW.                          DME Justifications:  No DME recommended requiring DME justifications    Time Tracking:     OT Date of Treatment: 06/05/25  OT Start Time: 1000  OT Stop Time: 1024  OT Total Time (min): 24 min + 15 min    Billable Minutes:Total Time 24 min + 15 min    OT/JOSSUE: JOSSUE     Number of JOSSUE visits since last OT visit: 1    6/5/2025

## 2025-06-05 NOTE — PLAN OF CARE
POC updated and reviewed at bedside with PT and IDT  Ray sat OOB to chair for a few hours today, was able to do PT and OT. Immobilizer worn while OOB (PT endorses compression fracture recently secondary to Rx steroid). Tums and zofran today PRN  relieving Heartburn and nausea, respectively. Bowel ppx initiated. Remains on Hi flow O2. All VSS. Monitoring closely,.         Problem: Adult Inpatient Plan of Care  Goal: Plan of Care Review  Outcome: Ongoing  Goal: Patient-Specific Goal (Individualized)  Outcome: Ongoing  Goal: Absence of Hospital-Acquired Illness or Injury  Outcome: Ongoing  Goal: Optimal Comfort and Wellbeing  Outcome: Ongoing  Goal: Readiness for Transition of Care  Outcome: Ongoing     Problem: Pneumonia  Goal: Fluid Balance  Outcome: Ongoing  Goal: Resolution of Infection Signs and Symptoms  Outcome: Ongoing  Goal: Effective Oxygenation and Ventilation  Outcome: Ongoing     Problem: Wound  Goal: Optimal Coping  Outcome: Ongoing  Goal: Optimal Functional Ability  Outcome: Ongoing  Goal: Absence of Infection Signs and Symptoms  Outcome: Ongoing  Goal: Improved Oral Intake  Outcome: Ongoing  Goal: Optimal Pain Control and Function  Outcome: Ongoing  Goal: Skin Health and Integrity  Outcome: Ongoing  Goal: Optimal Wound Healing  Outcome: Ongoing     Problem: Skin Injury Risk Increased  Goal: Skin Health and Integrity  Outcome: Ongoing     Problem: Fall Injury Risk  Goal: Absence of Fall and Fall-Related Injury  Outcome: Ongoing     Problem: Breathing Pattern Ineffective  Goal: Effective Breathing Pattern  Outcome: Ongoing     Problem: Airway Clearance Ineffective  Goal: Effective Airway Clearance  Outcome: Ongoing

## 2025-06-06 LAB
ABSOLUTE EOSINOPHIL (OHS): 0.02 K/UL
ABSOLUTE MONOCYTE (OHS): 1.72 K/UL (ref 0.3–1)
ABSOLUTE NEUTROPHIL COUNT (OHS): 9.38 K/UL (ref 1.8–7.7)
ALBUMIN SERPL BCP-MCNC: 2.3 G/DL (ref 3.5–5.2)
ALP SERPL-CCNC: 84 UNIT/L (ref 40–150)
ALT SERPL W/O P-5'-P-CCNC: 40 UNIT/L (ref 10–44)
ANION GAP (OHS): 9 MMOL/L (ref 8–16)
AST SERPL-CCNC: 19 UNIT/L (ref 11–45)
BASOPHILS # BLD AUTO: 0.04 K/UL
BASOPHILS NFR BLD AUTO: 0.3 %
BILIRUB SERPL-MCNC: 0.9 MG/DL (ref 0.1–1)
BUN SERPL-MCNC: 27 MG/DL (ref 8–23)
CALCIUM SERPL-MCNC: 8.8 MG/DL (ref 8.7–10.5)
CHLORIDE SERPL-SCNC: 97 MMOL/L (ref 95–110)
CO2 SERPL-SCNC: 32 MMOL/L (ref 23–29)
CREAT SERPL-MCNC: 1 MG/DL (ref 0.5–1.4)
ERYTHROCYTE [DISTWIDTH] IN BLOOD BY AUTOMATED COUNT: 16.4 % (ref 11.5–14.5)
GFR SERPLBLD CREATININE-BSD FMLA CKD-EPI: >60 ML/MIN/1.73/M2
GLUCOSE SERPL-MCNC: 106 MG/DL (ref 70–110)
HCT VFR BLD AUTO: 37.8 % (ref 40–54)
HGB BLD-MCNC: 12.1 GM/DL (ref 14–18)
IMM GRANULOCYTES # BLD AUTO: 0.54 K/UL (ref 0–0.04)
IMM GRANULOCYTES NFR BLD AUTO: 4.4 % (ref 0–0.5)
LYMPHOCYTES # BLD AUTO: 0.45 K/UL (ref 1–4.8)
MAGNESIUM SERPL-MCNC: 1.8 MG/DL (ref 1.6–2.6)
MCH RBC QN AUTO: 28.9 PG (ref 27–31)
MCHC RBC AUTO-ENTMCNC: 32 G/DL (ref 32–36)
MCV RBC AUTO: 90 FL (ref 82–98)
NUCLEATED RBC (/100WBC) (OHS): 0 /100 WBC
PHOSPHATE SERPL-MCNC: 2.1 MG/DL (ref 2.7–4.5)
PLATELET # BLD AUTO: 94 K/UL (ref 150–450)
PMV BLD AUTO: 12.1 FL (ref 9.2–12.9)
POTASSIUM SERPL-SCNC: 3.6 MMOL/L (ref 3.5–5.1)
PROT SERPL-MCNC: 5.5 GM/DL (ref 6–8.4)
RBC # BLD AUTO: 4.18 M/UL (ref 4.6–6.2)
RELATIVE EOSINOPHIL (OHS): 0.2 %
RELATIVE LYMPHOCYTE (OHS): 3.7 % (ref 18–48)
RELATIVE MONOCYTE (OHS): 14.2 % (ref 4–15)
RELATIVE NEUTROPHIL (OHS): 77.2 % (ref 38–73)
SODIUM SERPL-SCNC: 138 MMOL/L (ref 136–145)
WBC # BLD AUTO: 12.15 K/UL (ref 3.9–12.7)

## 2025-06-06 PROCEDURE — 80053 COMPREHEN METABOLIC PANEL: CPT | Performed by: STUDENT IN AN ORGANIZED HEALTH CARE EDUCATION/TRAINING PROGRAM

## 2025-06-06 PROCEDURE — 97110 THERAPEUTIC EXERCISES: CPT | Mod: CQ

## 2025-06-06 PROCEDURE — 25000003 PHARM REV CODE 250: Performed by: STUDENT IN AN ORGANIZED HEALTH CARE EDUCATION/TRAINING PROGRAM

## 2025-06-06 PROCEDURE — 99900031 HC PATIENT EDUCATION (STAT)

## 2025-06-06 PROCEDURE — 99900035 HC TECH TIME PER 15 MIN (STAT)

## 2025-06-06 PROCEDURE — 94761 N-INVAS EAR/PLS OXIMETRY MLT: CPT

## 2025-06-06 PROCEDURE — 94640 AIRWAY INHALATION TREATMENT: CPT

## 2025-06-06 PROCEDURE — 97110 THERAPEUTIC EXERCISES: CPT

## 2025-06-06 PROCEDURE — 85025 COMPLETE CBC W/AUTO DIFF WBC: CPT | Performed by: STUDENT IN AN ORGANIZED HEALTH CARE EDUCATION/TRAINING PROGRAM

## 2025-06-06 PROCEDURE — 97530 THERAPEUTIC ACTIVITIES: CPT | Mod: CQ

## 2025-06-06 PROCEDURE — 25000242 PHARM REV CODE 250 ALT 637 W/ HCPCS

## 2025-06-06 PROCEDURE — 27000221 HC OXYGEN, UP TO 24 HOURS

## 2025-06-06 PROCEDURE — 63600175 PHARM REV CODE 636 W HCPCS

## 2025-06-06 PROCEDURE — 97535 SELF CARE MNGMENT TRAINING: CPT

## 2025-06-06 PROCEDURE — 27100171 HC OXYGEN HIGH FLOW UP TO 24 HOURS

## 2025-06-06 PROCEDURE — 36415 COLL VENOUS BLD VENIPUNCTURE: CPT | Performed by: STUDENT IN AN ORGANIZED HEALTH CARE EDUCATION/TRAINING PROGRAM

## 2025-06-06 PROCEDURE — 83735 ASSAY OF MAGNESIUM: CPT | Performed by: STUDENT IN AN ORGANIZED HEALTH CARE EDUCATION/TRAINING PROGRAM

## 2025-06-06 PROCEDURE — 11000001 HC ACUTE MED/SURG PRIVATE ROOM

## 2025-06-06 PROCEDURE — 25000003 PHARM REV CODE 250

## 2025-06-06 PROCEDURE — 94668 MNPJ CHEST WALL SBSQ: CPT

## 2025-06-06 PROCEDURE — 84100 ASSAY OF PHOSPHORUS: CPT | Performed by: STUDENT IN AN ORGANIZED HEALTH CARE EDUCATION/TRAINING PROGRAM

## 2025-06-06 RX ORDER — SODIUM,POTASSIUM PHOSPHATES 280-250MG
2 POWDER IN PACKET (EA) ORAL
Status: COMPLETED | OUTPATIENT
Start: 2025-06-06 | End: 2025-06-06

## 2025-06-06 RX ADMIN — ONDANSETRON 4 MG: 4 TABLET, ORALLY DISINTEGRATING ORAL at 09:06

## 2025-06-06 RX ADMIN — ROFLUMILAST 500 MCG: 500 TABLET ORAL at 09:06

## 2025-06-06 RX ADMIN — GUAIFENESIN 1200 MG: 600 TABLET, MULTILAYER, EXTENDED RELEASE ORAL at 09:06

## 2025-06-06 RX ADMIN — MELATONIN TAB 3 MG 6 MG: 3 TAB at 09:06

## 2025-06-06 RX ADMIN — AMITRIPTYLINE HYDROCHLORIDE 25 MG: 25 TABLET, FILM COATED ORAL at 09:06

## 2025-06-06 RX ADMIN — PANTOPRAZOLE SODIUM 40 MG: 40 TABLET, DELAYED RELEASE ORAL at 09:06

## 2025-06-06 RX ADMIN — POTASSIUM & SODIUM PHOSPHATES POWDER PACK 280-160-250 MG 2 PACKET: 280-160-250 PACK at 05:06

## 2025-06-06 RX ADMIN — MIRTAZAPINE 7.5 MG: 7.5 TABLET, FILM COATED ORAL at 09:06

## 2025-06-06 RX ADMIN — ATORVASTATIN CALCIUM 80 MG: 80 TABLET, FILM COATED ORAL at 09:06

## 2025-06-06 RX ADMIN — TIOTROPIUM BROMIDE INHALATION SPRAY 2 PUFF: 3.12 SPRAY, METERED RESPIRATORY (INHALATION) at 08:06

## 2025-06-06 RX ADMIN — DILTIAZEM HYDROCHLORIDE 120 MG: 30 TABLET, FILM COATED ORAL at 06:06

## 2025-06-06 RX ADMIN — LEVALBUTEROL HYDROCHLORIDE 1.25 MG: 0.63 SOLUTION RESPIRATORY (INHALATION) at 04:06

## 2025-06-06 RX ADMIN — LEVALBUTEROL HYDROCHLORIDE 1.25 MG: 0.63 SOLUTION RESPIRATORY (INHALATION) at 08:06

## 2025-06-06 RX ADMIN — LEVETIRACETAM 500 MG: 500 TABLET, FILM COATED ORAL at 09:06

## 2025-06-06 RX ADMIN — Medication 4 ML: at 08:06

## 2025-06-06 RX ADMIN — DILTIAZEM HYDROCHLORIDE 120 MG: 30 TABLET, FILM COATED ORAL at 01:06

## 2025-06-06 RX ADMIN — FUROSEMIDE 40 MG: 40 TABLET ORAL at 09:06

## 2025-06-06 RX ADMIN — FLUTICASONE PROPIONATE 100 MCG: 50 SPRAY, METERED NASAL at 09:06

## 2025-06-06 RX ADMIN — ASPIRIN 81 MG: 81 TABLET, COATED ORAL at 09:06

## 2025-06-06 RX ADMIN — LEVALBUTEROL HYDROCHLORIDE 1.25 MG: 0.63 SOLUTION RESPIRATORY (INHALATION) at 11:06

## 2025-06-06 RX ADMIN — MUPIROCIN: 20 OINTMENT TOPICAL at 09:06

## 2025-06-06 RX ADMIN — DILTIAZEM HYDROCHLORIDE 120 MG: 30 TABLET, FILM COATED ORAL at 09:06

## 2025-06-06 RX ADMIN — POTASSIUM & SODIUM PHOSPHATES POWDER PACK 280-160-250 MG 2 PACKET: 280-160-250 PACK at 11:06

## 2025-06-06 RX ADMIN — Medication: at 09:06

## 2025-06-06 RX ADMIN — ENOXAPARIN SODIUM 40 MG: 40 INJECTION SUBCUTANEOUS at 05:06

## 2025-06-06 RX ADMIN — LEVALBUTEROL HYDROCHLORIDE 1.25 MG: 0.63 SOLUTION RESPIRATORY (INHALATION) at 12:06

## 2025-06-06 NOTE — PROGRESS NOTES
Email sent to daughter Marilou regarding Nursing Homes to view, a couple of Swivl and medicaid allowable resources.

## 2025-06-06 NOTE — PLAN OF CARE
06/06/25 1100   Discharge Assessment   Assessment Type Discharge Planning Assessment   Confirmed/corrected address, phone number and insurance Yes   Confirmed Demographics Correct on Facesheet   Source of Information family   If unable to respond/provide information was family/caregiver contacted? Yes   Contact Name/Number SHONNA HALL, DAUGHTER, [phone]   Communicated MEI with patient/caregiver Yes   People in Home child(selene), adult;other relative(s)   Name(s) of People in Home SHONNA HALL, DAUGHTER, [phone] & SON IN LAW CHECO HALL [phone]   Facility Arrived From: [facility]   Do you expect to return to your current living situation? Other (see comments)  (UNSURE AT THIS TIME)   Do you have help at home or someone to help you manage your care at home? No   Prior to hospitilization cognitive status: Alert/Oriented   Current cognitive status: Alert/Oriented   Walking or Climbing Stairs Difficulty no   Dressing/Bathing Difficulty no   Home Accessibility wheelchair accessible   Home Layout Able to live on 1st floor   Equipment Currently Used at Home cane, straight;CPAP;walker, rolling;oxygen   Readmission within 30 days? No   Patient currently being followed by outpatient case management? No   Do you currently have service(s) that help you manage your care at home? No   Do you take prescription medications? Yes   Do you have prescription coverage? Yes   Do you have any problems affording any of your prescribed medications? No   Is the patient taking medications as prescribed? yes   Who is going to help you get home at discharge? SHONNA HALL, DAUGHTER, [phone]   How do you get to doctors appointments? family or friend will provide   Are you on dialysis? No   Do you take coumadin? No   Discharge Plan A Rehab;Skilled Nursing Facility   Discharge Plan B Home with family;Home Health;New Nursing Home placement - [facility] care facility   DME Needed Upon Discharge  other (see  comments)  (TBD)   Discharge Plan discussed with: Adult children  (SHONNA HALL, DAUGHTER, [phone])   Physical Activity   On average, how many days per week do you engage in moderate to strenuous exercise (like a brisk walk)? Pt Unable   On average, how many minutes do you engage in exercise at this level? Pt Unable   Financial Resource Strain   How hard is it for you to pay for the very basics like food, housing, medical care, and heating? Not very   Housing Stability   In the last 12 months, was there a time when you were not able to pay the mortgage or rent on time? N   At any time in the past 12 months, were you homeless or living in a shelter (including now)? N   Transportation Needs   In the past 12 months, has lack of transportation kept you from medical appointments or from getting medications? no   In the past 12 months, has lack of transportation kept you from meetings, work, or from getting things needed for daily living? No   Food Insecurity   Within the past 12 months, you worried that your food would run out before you got the money to buy more. Never true   Within the past 12 months, the food you bought just didn't last and you didn't have money to get more. Never true   Stress   Do you feel stress - tense, restless, nervous, or anxious, or unable to sleep at night because your mind is troubled all the time - these days? To some exte   Social Isolation   How often do you feel lonely or isolated from those around you?  Sometimes   Alcohol Use   Q1: How often do you have a drink containing alcohol? Pt Unable   Q2: How many drinks containing alcohol do you have on a typical day when you are drinking? Pt Unable   Q3: How often do you have six or more drinks on one occasion? Pt Unable   Utilities   In the past 12 months has the electric, gas, oil, or water company threatened to shut off services in your home? No   Health Literacy   How often do you need to have someone help you when you read  instructions, pamphlets, or other written material from your doctor or pharmacy? Sometimes     Pt discharged [facility] for acute respiratory failure with hypoxia. Lives in a two story home with 2 small steps to enter & no railing. Pt is able to live on the first floor. Other residents in the home include: daughter and son in law, upon question if home modification would be done if needed. Daughter answered no r/t ramp or railing. Pt is  with 2 daughters, denies any legal POA document other than a living will. Denies any previous services of HD. HH experience with [facility]. Current home DME: Cpap, oxygen prn (unsure of company it comes from), RW, straight cane. DME upon discharge: TBD. Family requested if pt returns to the home they will need sitters or cna's of some sort. Verbal consent received via phone from SHONNA HALL, DAUGHTER, [phone] concerning Important Medicare Message Form & Statement of patient acknowledgement form, verified by two nurses. Discharge plan A: Rehab vs SNF, Discharge plan B: possible NH placement if unable to be cared for at home. SHONNA HALL, DAUGHTER, [phone] notified I would email over medicaid resources needed for a NH and possible NH to begin looking at, stated understanding.

## 2025-06-06 NOTE — PT/OT/SLP PROGRESS
Physical Therapy Treatment    Patient Name:  Jordin Allen Jr.   MRN:  [id number]    Recommendations:     Discharge Recommendations: Moderate Intensity Therapy  Discharge Equipment Recommendations: cane, straight, walker, rolling, shower chair  Barriers to discharge: Inaccessible home    Assessment:     Jordin Allen Jr. is a 79 y.o. male admitted with a medical diagnosis of Acute respiratory failure with hypoxia.  He presents with the following impairments/functional limitations: weakness, impaired endurance, impaired self care skills, impaired functional mobility, gait instability, impaired balance, impaired cardiopulmonary response to activity .    Rehab Prognosis: Good; patient would benefit from acute skilled PT services to address these deficits and reach maximum level of function.    Recent Surgery: * No surgery found *      Plan:     During this hospitalization, patient to be seen 5 x/week to address the identified rehab impairments via gait training, therapeutic activities, therapeutic exercises, neuromuscular re-education and progress toward the following goals:    Plan of Care Expires:       Subjective     Chief Complaint: none  Patient/Family Comments/goals: n/a  Pain/Comfort:         Objective:     Communicated with PT prior to session.  Patient found HOB elevated with telemetry, blood pressure cuff, pulse ox (continuous), peripheral IV upon PT entry to room.     General Precautions: Standard, fall, aspiration, droplet  Orthopedic Precautions: spinal precautions  Braces: TLSO  Respiratory Status: Nasal cannula, flow 6 L/min     Functional Mobility:  Bed Mobility:     Supine to Sit: moderate assistance  Sit to Supine: moderate assistance  Transfers:     Bed to Chair: moderate assistance,  no AD  using  Squat Pivot           AM-PAC 6 CLICK MOBILITY          Treatment & Education:  Pt performed bed mobility as noted to sit EOB . Pt performed (B) LE TE AROM with gentle stretching in all available planes  15 x 2 sets ea. Bed<> Chair as noted . Pt tolerated ~ 3 hours in chair.     Patient left HOB elevated with all lines intact, call button in reach, and bed alarm on..    GOALS:   Multidisciplinary Problems       Physical Therapy Goals          Problem: Physical Therapy    Goal Priority Disciplines Outcome Interventions   Physical Therapy Goal     PT, PT/OT Progressing    Description: Goals to be met by: DC     Patient will increase functional independence with mobility by performin. Supine to sit with Miner  2. Sit to supine with Miner  3. Sit to stand transfer with Supervision with RW  4. Gait  x 150 feet with Contact Guard Assistance using Rolling Walker.                          DME Justifications:  No DME recommended requiring DME justifications    Time Tracking:     PT Received On: 25  PT Start Time: 915     PT Stop Time: 939  PT Total Time (min): 24 min     Billable Minutes: Total Time 24 min + 15 min    Treatment Type: Treatment  PT/PTA: PTA     Number of PTA visits since last PT visit: 2     2025

## 2025-06-06 NOTE — PROGRESS NOTES
06/06/25 1400        Wound 05/19/25 0233 Moisture associated dermatitis Left dorsal Foot   Date First Assessed/Time First Assessed: 05/19/25 0233   Present on Original Admission: Yes  Primary Wound Type: Moisture associated dermatitis  Side: Left  Orientation: dorsal  Location: Foot  Is this injury device related?: No   Dressing Appearance Open to air   Drainage Amount None   Drainage Characteristics/Odor No odor   Appearance Dry   Periwound Area Pink   Dressing   (applied Ammonium Lactate, phillip)        Wound 05/19/25 0231 Moisture associated dermatitis Left lateral Foot   Date First Assessed/Time First Assessed: 05/19/25 0231   Present on Original Admission: Yes  Primary Wound Type: Moisture associated dermatitis  Side: Left  Orientation: lateral  Location: Foot   Dressing Appearance Open to air   Drainage Amount None   Drainage Characteristics/Odor No odor   Appearance Dry   Periwound Area Dry   Dressing   (applied Ammonium Lactate, phillip)        Wound 05/19/25 0233 Moisture associated dermatitis Left anterior Foot   Date First Assessed/Time First Assessed: 05/19/25 0233   Primary Wound Type: Moisture associated dermatitis  Side: Left  Orientation: anterior  Location: Foot   Dressing Appearance Open to air   Drainage Amount None   Drainage Characteristics/Odor No odor   Periwound Area Dry   Care Cleansed with:;Wound cleanser   Dressing   (applied Ammonium Lactate, phillip)        Wound 05/19/25 0231 Pressure Injury Right anterior Ankle   Date First Assessed/Time First Assessed: 05/19/25 0231   Present on Original Admission: Yes  Primary Wound Type: Pressure Injury  Side: Right  Orientation: anterior  Location: Ankle   Pressure Injury Stage 1   Dressing Appearance Open to air   Drainage Characteristics/Odor No odor   Appearance Pink   Red (%), Wound Tissue Color 100 %   Dressing   (offloading boot)   Off Loading Off loading shoe        Wound 06/04/25 1030 Moisture associated dermatitis Right lateral Abdomen   Date  First Assessed/Time First Assessed: 06/04/25 1030   Present on Original Admission: Yes  Primary Wound Type: Moisture associated dermatitis  Side: Right  Orientation: lateral  Location: Abdomen   Dressing Appearance Open to air   Periwound Area Moist   Care Wound cleanser  (applied Triad paste)        Wound 06/04/25 1030 Moisture associated dermatitis Left lateral Abdomen   Date First Assessed/Time First Assessed: 06/04/25 1030   Present on Original Admission: Yes  Primary Wound Type: Moisture associated dermatitis  Side: Left  Orientation: lateral  Location: Abdomen   Dressing Appearance Open to air   Drainage Amount None   Periwound Area Moist   Care Wound cleanser   Dressing   (applied Triad paste)        Wound 06/04/25 1030 Moisture associated dermatitis Right Groin   Date First Assessed/Time First Assessed: 06/04/25 1030   Present on Original Admission: Yes  Primary Wound Type: Moisture associated dermatitis  Side: Right  Location: Groin  Is this injury device related?: No   Dressing Appearance Open to air   Drainage Amount None   Periwound Area Moist   Care Wound cleanser   Dressing   (applied Triad paste)        Wound 06/04/25 1030 Moisture associated dermatitis Left Groin   Date First Assessed/Time First Assessed: 06/04/25 1030   Present on Original Admission: Yes  Primary Wound Type: Moisture associated dermatitis  Side: Left  Location: Groin   Dressing Appearance Open to air   Drainage Amount None   Periwound Area Moist   Care Cleansed with:;Wound cleanser   Dressing   (applied Triad paste)        Wound 06/04/25 1030 Moisture associated dermatitis Sacral spine   Date First Assessed/Time First Assessed: 06/04/25 1030   Present on Original Admission: Yes  Primary Wound Type: (c) Moisture associated dermatitis  Location: (c) Sacral spine   Dressing Appearance Open to air;No dressing   Drainage Amount None   Periwound Area Moist

## 2025-06-06 NOTE — PLAN OF CARE
Problem: Breathing Pattern Ineffective  Goal: Effective Breathing Pattern  Intervention: Promote Improved Breathing Pattern  Flowsheets (Taken 6/6/2025 1026)  Airway/Ventilation Management:   airway patency maintained   humidification applied   pulmonary hygiene promoted  Breathing Techniques/Airway Clearance: deep/controlled cough encouraged  Head of Bed (HOB) Positioning: HOB elevated

## 2025-06-06 NOTE — PROGRESS NOTES
[facility] Medicine   Progress Note  Room: 212/212   Patient Name: Jordin Allen Jr.  MRN: [id number]  Admit Date: 6/3/2025   Length of Stay:  LOS: 3 days   Attending Physician: Bernardino Guthrie MD  Nurse Practitioner: Ernestine Palomino NP    Date of Service: 06/06/2025    Principal Problem:    Acute respiratory failure with hypoxia    Brief HPI:     Jordin Allen Jr. is a 79 y.o. 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  [place] [facility] [place] with complaints of shortness of breath and left foot redness.  Admitted to hospital medicine team for left foot cellulitis and acute hypoxic respiratory failure secondary to pneumonia/COPD exacerbation. CTA chest negative for PE. Emphysematous change with superimposed edema. Nodular focus within the anterior aspect of left upper lobe.  Started on nebs, prednisone, and empiric vanc/cefepime and doxycycline.  Respiratory panel positive for parainfluenza virus.  Course further complicated by sinus tach with PACs/18, for which he was started on diltiazem.  Blood cultures positive for staph, suspected to be contaminant.  Repeat blood cultures returned negative.  He will switch to Augmentin and doxy.  Ongoing episodes of SVT, thought to be due to underlying hypoxia.  He was able to be weaned down to high-flow nasal cannula 15 L.  Discharged to [facility] on 06/03/2025 for rehab, wound care, and O2 weaning.     Interval History    Reports sleeping better last night   Labs reviewed and listed below, no critical values   Phosphorus is 2.1 today, replacing with Neutra-Phos 2 packets x2 doses   Weaned to 4 L nasal cannula, O2 sats 96%       Subjective:     Review of Systems   Constitutional:  Positive for malaise/fatigue.   Respiratory:  Positive for cough, sputum production and  shortness of breath.    Cardiovascular:  Negative for chest pain and leg swelling.   Gastrointestinal:  Negative for heartburn, nausea and vomiting.   Genitourinary:  Negative for dysuria and urgency.   Musculoskeletal:  Negative for myalgias.   Neurological:  Positive for weakness. Negative for dizziness.   Psychiatric/Behavioral:  Negative for depression. The patient has insomnia.          Objective:     Vital Sign Range  Temp:  [97.7 °F (36.5 °C)-98.6 °F (37 °C)]   Pulse:  []   Resp:  [18-27]   BP: (101-177)/(57-77)   SpO2:  [92 %-100 %]     Body mass index is 31.24 kg/m².           Intake/Output Summary (Last 24 hours) at 6/6/2025 0938  Last data filed at 6/5/2025 1800  Gross per 24 hour   Intake 480 ml   Output 410 ml   Net 70 ml       Physical Exam  Constitutional:       Appearance: He is ill-appearing.   HENT:      Head: Normocephalic and atraumatic.      Mouth/Throat:      Mouth: Mucous membranes are moist.      Pharynx: Oropharynx is clear.   Eyes:      Extraocular Movements: Extraocular movements intact.      Pupils: Pupils are equal, round, and reactive to light.   Cardiovascular:      Rate and Rhythm: Normal rate. Rhythm irregular.   Pulmonary:      Breath sounds: Wheezing and rhonchi present.   Abdominal:      General: Bowel sounds are normal.      Palpations: Abdomen is soft.   Musculoskeletal:      Right lower leg: Edema present.      Left lower leg: Edema present.   Skin:     General: Skin is warm and dry.   Neurological:      Mental Status: He is alert and oriented to person, place, and time. Mental status is at baseline.   Psychiatric:         Mood and Affect: Affect is flat.         Wounds       Wound 05/19/25 0233 Moisture associated dermatitis Left dorsal Foot (Active)   05/19/25 0233 Foot   Present on Original Admission: Y   Primary Wound Type: Moisture ass   Side: Left   Orientation: dorsal        Wound 05/19/25 0231 Moisture associated dermatitis Left lateral Foot (Active)   05/19/25  0231 Foot   Present on Original Admission: Y   Primary Wound Type: Moisture ass   Side: Left   Orientation: lateral        Wound 05/19/25 0231 Pressure Injury Right anterior Ankle (Active)   05/19/25 0231 Ankle   Present on Original Admission: Y   Primary Wound Type: Pressure inj   Side: Right   Orientation: anterior        Wound 05/19/25 0233 Moisture associated dermatitis Left anterior Foot (Active)   05/19/25 0233 Foot   Present on Original Admission:    Primary Wound Type: Moisture ass   Side: Left   Orientation: anterior        Wound 06/04/25 1030 Moisture associated dermatitis Right lateral Abdomen (Active)   06/04/25 1030 Abdomen   Present on Original Admission: Y   Primary Wound Type: Moisture ass   Side: Right   Orientation: lateral        Wound 06/04/25 1030 Moisture associated dermatitis Left lateral Abdomen (Active)   06/04/25 1030 Abdomen   Present on Original Admission: Y   Primary Wound Type: Moisture ass   Side: Left   Orientation: lateral        Wound 06/04/25 1030 Moisture associated dermatitis Right Groin (Active)   06/04/25 1030 Groin   Present on Original Admission: Y   Primary Wound Type: Moisture ass   Side: Right        Wound 06/04/25 1030 Moisture associated dermatitis Left Groin (Active)   06/04/25 1030 Groin   Present on Original Admission: Y   Primary Wound Type: Moisture ass   Side: Left        Wound 06/04/25 1030 Moisture associated dermatitis Sacral spine (Active)   06/04/25 1030 Sacral spine   Present on Original Admission: Y   Primary Wound Type: Moisture ass         Wounds consistently followed by [facility] NP Sheree Tao     Labs:  Recent Labs   Lab 06/03/25  0213 06/04/25  0851 06/06/25  0526   WBC 12.52 12.92* 12.15   HGB 12.5* 12.9* 12.1*   HCT 40.6 41.2 37.8*    144* 94*     Recent Labs   Lab 06/03/25  0213 06/04/25  0344 06/06/25  0526    142 138   K 4.3 4.0 3.6    104 97   CO2 29 28 32*   BUN 40* 39* 27*   CREATININE 1.0 1.0 1.0   * 118* 106  "  CALCIUM 8.6* 8.4* 8.8   MG 1.9 1.8 1.8   PHOS 2.7 2.6* 2.1*     Recent Labs   Lab 06/03/25  0213 06/04/25  0344 06/06/25  0526   ALKPHOS 92 99 84   ALT 54* 53* 40   AST 19 25 19   ALBUMIN 2.5* 2.4* 2.3*   PROT 5.3* 5.2* 5.5*   BILITOT 0.6 0.5 0.9     No results for input(s): "POCTGLUCOSE" in the last 72 hours.    Meds Scheduled:   amitriptyline  25 mg Oral QHS    ammonium lactate   Topical (Top) Daily    aspirin  81 mg Oral QAM    atorvastatin  80 mg Oral Daily    diltiaZEM  120 mg Oral Q8H    enoxparin  40 mg Subcutaneous Daily    fluticasone propionate  2 spray Each Nostril Daily    furosemide  40 mg Oral Daily    guaiFENesin  1,200 mg Oral BID    levalbuterol  1.2495 mg Nebulization Q4H    levETIRAcetam  500 mg Oral BID    mirtazapine  7.5 mg Oral QHS    mupirocin   Nasal BID    pantoprazole  40 mg Oral Daily    roflumilast  500 mcg Oral Daily    sodium chloride 3%  4 mL Nebulization BID    tiotropium bromide  2 puff Inhalation Daily       Current Inpatient Problem List:  Active Hospital Problems    Diagnosis  POA    *Acute respiratory failure with hypoxia [J96.01]  Yes    Parainfluenza virus infection [B34.8]  Yes    Pneumonia [J18.9]  Yes    Cellulitis [L03.90]  Yes    TIA (transient ischemic attack) [G45.9]  Yes     ASA and statin      Metastasis to brain [C79.31]  Yes    Adenocarcinoma, lung, 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       CAD (coronary artery disease) [I25.10]  Yes     Chronic     -[facility] (10/31/13) for stemi: pLAD 100%, Cx with Li's, mRCA 40%, LVEF 55%,. LVEDP 15   -Xience 3.0 x 33 mm to pLAD, post dilated with 3.5 NC and 4.0 NC.   -TTE (8/14/13): LVEF 55%, grade I diastolic dysfunction      HTN (hypertension), benign [I10]  Yes    SHOLA (obstructive sleep apnea) [G47.33]  Yes     CPAP at night      GERD (gastroesophageal reflux disease) " [K21.9]  Yes     Continue PPI        Resolved Hospital Problems   No resolved problems to display.         Assessment / Plan:     Acute respiratory failure with hypoxia  COPD with acute exacerbation  Parainfluenza virus infection  Pneumonia   On Symbicort Spiriva and Daliresp at home. Follows up with pulmonology outpatient.   Completed 7 day course of Augmentin and doxycycline on 06/01  Continue Daliresp 500mcg daily  Continue Xopenex p.r.n.   Completed course of prednisone  Continue doxycycline 100 mg b.i.d.  Continue weaning high-flow nasal cannula  Continue furosemide 40 mg daily  Continue guaifenesin 1200 mg b.i.d.  Weaned to 4 L nasal cannula, O2 sats 96%      Cellulitis   Wound followed and managed by consistent, contracted [facility] NP  Completed 7 day course of doxy and Augmentin  Will need follow up with Podiatry after discharge     Restless leg syndrome   Continue amitriptyline 25 mg nightly     Adenocarcinoma, lung, left   Metastasis to brain  Completed treatment with chemo/XRT. Brain XRT for lesionsx2. off immunotherapy since 12/24  suspect the LLL area was consolidation of prior RXT changes and not malignancy and is nearly resolved.  CT on presentation with new SANJAY nodule, need op follow up with pulm  Continue prophylactic Keppra 500 mg b.i.d.    Compression fracture of L1 vertebrae with routine healing   TLSO brace when out of bed  F/u with Dr. Hopkins     Dyslipidemia  TIA  CAD   Continue aspirin   Continue atorvastatin 80 mg daily     Tachycardia   Episodes of SVT while at the hospital   Evaluated by Cardiology   Likely worsened by underlying hypoxia   Supplemental O2   Continue diltiazem 120 mg q.8 hours  Cardiac monitoring     SHOLA   Continue CPAP nightly     GERD  Continue PPI daily     Left lateral foot wound   Right anterior ankle wound   Traumatic left dorsal foot wound   Left anterior foot venous ulcer   Skin tear right distal arm   Pressure injury nose  Wound followed and managed by  consistent, contracted [facility] NP    Advance Care Planning, 06/05/2025  This was a voluntary visit and the option to decline counseling was given  Length of discussion:  16 minutes  Life limiting problem:  Respiratory failure, adenocarcinoma  Topics discussed:  Code status  Outcome of discussion:  Patient would like to remain a full code.  In the event that he is not able to make his own decisions, he would like to defer decision making to his daughter  Decision Maker:Jordin Allen     Psychiatric Assessment  Patient Health Questionnaire-9 (PHQ-9)    Date:  06/05/2025  Total Score: 15  Screening is Positive   Diagnosis and Treatment Plan:  Moderate MDD   Continue amitriptyline 25 mg nightly   Continue mirtazapine 7.5 mg nightly, which will hopefully help with his appetite as well       Diet:  Cardiac   GI Ppx:  PPI   DVT Ppx:  Enoxaparin     Lines:  PIV   Drains:  None   Airways:n/a   Wounds:  Multiple    Goals of Care:   Restorative,  Treat infection  Optimize nutrition  Wound healing  Muscle strengthening  Restoration of ADL's  Improve mobility    Anticipated Disposition:     6/21 home with     Follow up appointments:   Future Appointments   Date Time Provider Department Center   6/24/2025  7:15 AM [facility] OI-MRI2 [facility]   6/24/2025  9:30 AM Steven Campos MD [facility] NEUROSC [place]   6/24/2025  2:00 PM Bienvenido Henson MD [facility] RADONC3 [place]   7/1/2025 10:30 AM [facility] OI-XRAY [facility]   7/1/2025 11:30 AM Bolivar Sr PA [facility] NEUROS8 [address]   7/8/2025  9:15 AM MEEKS, VISUAL FIELDS [facility] OPHTHAL [facility]   7/8/2025 10:00 AM Cara Looney MD [facility] OPHTHAL [address]   7/15/2025 10:00 AM Garrett Lloyd DPM [facility] POD [address]   7/23/2025 10:15 AM CV [facility] ECHO [facility]IA [place]   8/15/2025  9:20 AM Bienvenido Ward MD [facility] PULMON [place]   8/27/2025  9:00 AM Guido Trejo MD [facility]   9/8/2025 11:10 AM Yan Palmer,  MD NELSON Palomino, NP  Hospital Medicine [facility]    I have spent 53 minutes reviewing patient records, examining, and  of the patient/ patient's family with greater than 50% of the time spent with direct patient care and coordination.

## 2025-06-06 NOTE — CARE UPDATE
06/05/25 1905   Patient Assessment/Suction   Level of Consciousness (AVPU) alert   Respiratory Effort Unlabored   Expansion/Accessory Muscles/Retractions no use of accessory muscles;no retractions;expansion symmetric   All Lung Fields Breath Sounds coarse   Rhythm/Pattern, Respiratory unlabored;pattern regular   Cough Frequency infrequent   PRE-TX-O2   Device (Oxygen Therapy) nasal cannula with humidification   Flow (L/min) (Oxygen Therapy) 5   SpO2 97 %   Pulse Oximetry Type Continuous   $ Pulse Oximetry - Multiple Charge Pulse Oximetry - Multiple   Pulse 102   Resp 18   Positioning HOB elevated 30 degrees   Aerosol Therapy   $ Aerosol Therapy Charges Aerosol Treatment   Daily Review of Necessity (SVN) completed   Respiratory Treatment Status (SVN) given   Treatment Route (SVN) mask   Patient Position HOB elevated   Post Treatment Assessment (SVN) breath sounds unchanged   Signs of Intolerance (SVN) none   Breath Sounds Post-Respiratory Treatment   Post-treatment Heart Rate (beats/min) 90   Post-treatment Resp Rate (breaths/min) 22   Chest Physiotherapy   $ Chest Physiotherapy Charges Subsequent   Type (CPT) percussion   Method (CPT) manual percussor   Patient Position (CPT) HOB elevated   Post Treatment Assessment (CPT) breath sounds unchanged   Signs of Intolerance (CPT) none   Respiratory Evaluation   $ Care Plan Tech Time 15 min

## 2025-06-06 NOTE — PT/OT/SLP PROGRESS
"Occupational Therapy   Treatment    Name: Jordin Allen Jr.  MRN: [id number]  Admitting Diagnosis:  Acute respiratory failure with hypoxia       Recommendations:     Discharge Recommendations: Moderate Intensity Therapy  Discharge Equipment Recommendations:  other (see comments) (TBD pending progress)  Barriers to discharge:  Decreased caregiver support, Other (Comment) (increased (A) required with ADLs and mobility)    Assessment:     Jordin Allen Jr. is a 79 y.o. male with a medical diagnosis of Acute respiratory failure with hypoxia.  Pt had limited tolerance of session due to fatigue and having recently returned to bed after sitting in the bedside chair.  He continues to require (A) with ADLs and mobility.  He presents with the following.  Performance deficits affecting function are weakness, impaired endurance, impaired self care skills, impaired functional mobility, gait instability, impaired balance, impaired cardiopulmonary response to activity.     Rehab Prognosis:  Good; patient would benefit from acute skilled OT services to address these deficits and reach maximum level of function.       Plan:     Patient to be seen 5 x/week to address the above listed problems via self-care/home management, therapeutic activities, therapeutic exercises  Plan of Care Expires: 07/03/25  Plan of Care Reviewed with: patient    Subjective     Chief Complaint: "That wore me out." Bed-level Thera-Band exercises  Patient/Family Comments/goals: "I need to shave."  Pain/Comfort:  Pain Rating 1: 0/10  Pain Rating Post-Intervention 1: 0/10    Objective:     Communicated with: nurse prior to session.  Patient found HOB elevated with telemetry, blood pressure cuff, pulse ox (continuous), oxygen, peripheral IV upon OT entry to room.    General Precautions: Standard, fall, aspiration    Orthopedic Precautions:spinal precautions  Braces: TLSO  Respiratory Status: Nasal cannula, flow 4 L/min     Occupational Performance:     Bed " Mobility:    Pt declined to perform    Functional Mobility/Transfers:  Pt declined to perform    Activities of Daily Living:  Grooming: setup assistance to wash his face with a wash cloth at bed level.  Max A to shave his face at bed level.     [facility] 6 Click ADL: 15    Treatment & Education:  - The following UE resistive exercises were performed at bed level using yellow Thera-Band after therapist demonstration for UE strengthening that's required for daily activities: 2 sets of 10 reps of horizontal shoulder abd/add with scapular retraction, 1 set of 10 reps of bilateral isolated triceps pulls, and 1 set of 10 reps of bilateral isolated shoulder flex/ext.  Pt was fatigued while and after performing, but his oxygen sats remained > 90%.    Pt edu on role of OT, POC, safety when performing self care tasks, benefit of performing OOB activity, and safety when performing functional transfers and functional mobility.    - Self care tasks completed-- as noted above       Patient left HOB elevated with all lines intact and call button in reach    GOALS:   Multidisciplinary Problems       Occupational Therapy Goals          Problem: Occupational Therapy    Goal Priority Disciplines Outcome Interventions   Occupational Therapy Goal     OT, PT/OT Progressing    Description: Goals to be met by: 7/3/2025     Patient will increase functional independence with ADLs by performing:    UE Dressing with Set-up Assistance.  LE Dressing with SBA using appropriate AE as needed.  Grooming while seated at sink with Set-up Assistance.  Toileting from 3-in-1 commode over toilet with Stand-by Assistance for hygiene and clothing management.   Supine to sit with Mod I.   Step transfer with Stand-by Assistance with RW.  Toilet transfer to 3-in-1 commode over toilet with Stand-by Assistance with RW.                         Time Tracking:     OT Date of Treatment: 06/06/25  OT Start Time: 1319  OT Stop Time: 1348  OT Total Time (min): 29  min    Billable Minutes:Self Care/Home Management 19 min  Therapeutic Exercise 10 min    OT/JOSSUE: OT     Number of JOSSUE visits since last OT visit: 0    6/6/2025

## 2025-06-07 PROCEDURE — 25000003 PHARM REV CODE 250: Performed by: STUDENT IN AN ORGANIZED HEALTH CARE EDUCATION/TRAINING PROGRAM

## 2025-06-07 PROCEDURE — 99900031 HC PATIENT EDUCATION (STAT)

## 2025-06-07 PROCEDURE — 99900035 HC TECH TIME PER 15 MIN (STAT)

## 2025-06-07 PROCEDURE — 25000242 PHARM REV CODE 250 ALT 637 W/ HCPCS

## 2025-06-07 PROCEDURE — 27000221 HC OXYGEN, UP TO 24 HOURS

## 2025-06-07 PROCEDURE — 25000003 PHARM REV CODE 250

## 2025-06-07 PROCEDURE — 63600175 PHARM REV CODE 636 W HCPCS

## 2025-06-07 PROCEDURE — 94640 AIRWAY INHALATION TREATMENT: CPT

## 2025-06-07 PROCEDURE — 11000001 HC ACUTE MED/SURG PRIVATE ROOM

## 2025-06-07 PROCEDURE — 27100171 HC OXYGEN HIGH FLOW UP TO 24 HOURS

## 2025-06-07 PROCEDURE — 25000003 PHARM REV CODE 250: Performed by: HOSPITALIST

## 2025-06-07 PROCEDURE — 94761 N-INVAS EAR/PLS OXIMETRY MLT: CPT

## 2025-06-07 PROCEDURE — 94668 MNPJ CHEST WALL SBSQ: CPT

## 2025-06-07 RX ORDER — MIRTAZAPINE 15 MG/1
15 TABLET, FILM COATED ORAL NIGHTLY
Status: DISCONTINUED | OUTPATIENT
Start: 2025-06-07 | End: 2025-06-27

## 2025-06-07 RX ORDER — OXYCODONE AND ACETAMINOPHEN 5; 325 MG/1; MG/1
1 TABLET ORAL EVERY 4 HOURS PRN
Refills: 0 | Status: DISCONTINUED | OUTPATIENT
Start: 2025-06-07 | End: 2025-06-22

## 2025-06-07 RX ADMIN — OXYCODONE HYDROCHLORIDE AND ACETAMINOPHEN 1 TABLET: 5; 325 TABLET ORAL at 01:06

## 2025-06-07 RX ADMIN — MUPIROCIN: 20 OINTMENT TOPICAL at 09:06

## 2025-06-07 RX ADMIN — LEVETIRACETAM 500 MG: 500 TABLET, FILM COATED ORAL at 09:06

## 2025-06-07 RX ADMIN — ATORVASTATIN CALCIUM 80 MG: 80 TABLET, FILM COATED ORAL at 09:06

## 2025-06-07 RX ADMIN — LEVALBUTEROL HYDROCHLORIDE 1.25 MG: 0.63 SOLUTION RESPIRATORY (INHALATION) at 06:06

## 2025-06-07 RX ADMIN — LEVALBUTEROL HYDROCHLORIDE 1.25 MG: 0.63 SOLUTION RESPIRATORY (INHALATION) at 11:06

## 2025-06-07 RX ADMIN — LEVALBUTEROL HYDROCHLORIDE 1.25 MG: 0.63 SOLUTION RESPIRATORY (INHALATION) at 07:06

## 2025-06-07 RX ADMIN — GUAIFENESIN 1200 MG: 600 TABLET, MULTILAYER, EXTENDED RELEASE ORAL at 09:06

## 2025-06-07 RX ADMIN — ASPIRIN 81 MG: 81 TABLET, COATED ORAL at 09:06

## 2025-06-07 RX ADMIN — DILTIAZEM HYDROCHLORIDE 120 MG: 30 TABLET, FILM COATED ORAL at 06:06

## 2025-06-07 RX ADMIN — FLUTICASONE PROPIONATE 100 MCG: 50 SPRAY, METERED NASAL at 09:06

## 2025-06-07 RX ADMIN — ENOXAPARIN SODIUM 40 MG: 40 INJECTION SUBCUTANEOUS at 05:06

## 2025-06-07 RX ADMIN — DILTIAZEM HYDROCHLORIDE 120 MG: 30 TABLET, FILM COATED ORAL at 09:06

## 2025-06-07 RX ADMIN — OXYCODONE HYDROCHLORIDE AND ACETAMINOPHEN 1 TABLET: 5; 325 TABLET ORAL at 10:06

## 2025-06-07 RX ADMIN — LEVALBUTEROL HYDROCHLORIDE 1.25 MG: 0.63 SOLUTION RESPIRATORY (INHALATION) at 03:06

## 2025-06-07 RX ADMIN — LEVALBUTEROL HYDROCHLORIDE 1.25 MG: 0.63 SOLUTION RESPIRATORY (INHALATION) at 01:06

## 2025-06-07 RX ADMIN — Medication: at 09:06

## 2025-06-07 RX ADMIN — ROFLUMILAST 500 MCG: 500 TABLET ORAL at 10:06

## 2025-06-07 RX ADMIN — DILTIAZEM HYDROCHLORIDE 120 MG: 30 TABLET, FILM COATED ORAL at 01:06

## 2025-06-07 RX ADMIN — LEVALBUTEROL HYDROCHLORIDE 1.25 MG: 0.63 SOLUTION RESPIRATORY (INHALATION) at 04:06

## 2025-06-07 RX ADMIN — FUROSEMIDE 40 MG: 40 TABLET ORAL at 09:06

## 2025-06-07 RX ADMIN — MELATONIN TAB 3 MG 6 MG: 3 TAB at 09:06

## 2025-06-07 RX ADMIN — LEVALBUTEROL HYDROCHLORIDE 1.25 MG: 0.63 SOLUTION RESPIRATORY (INHALATION) at 10:06

## 2025-06-07 RX ADMIN — Medication 4 ML: at 08:06

## 2025-06-07 RX ADMIN — MIRTAZAPINE 15 MG: 15 TABLET, FILM COATED ORAL at 09:06

## 2025-06-07 RX ADMIN — TIOTROPIUM BROMIDE INHALATION SPRAY 2 PUFF: 3.12 SPRAY, METERED RESPIRATORY (INHALATION) at 08:06

## 2025-06-07 RX ADMIN — Medication 4 ML: at 06:06

## 2025-06-07 RX ADMIN — AMITRIPTYLINE HYDROCHLORIDE 25 MG: 25 TABLET, FILM COATED ORAL at 09:06

## 2025-06-07 RX ADMIN — PANTOPRAZOLE SODIUM 40 MG: 40 TABLET, DELAYED RELEASE ORAL at 09:06

## 2025-06-07 RX ADMIN — ONDANSETRON 4 MG: 4 TABLET, ORALLY DISINTEGRATING ORAL at 09:06

## 2025-06-07 NOTE — PLAN OF CARE
Problem: Pneumonia  Goal: Effective Oxygenation and Ventilation  Outcome: Progressing     Problem: Breathing Pattern Ineffective  Goal: Effective Breathing Pattern  Outcome: Progressing     Problem: Airway Clearance Ineffective  Goal: Effective Airway Clearance  Outcome: Progressing

## 2025-06-07 NOTE — CARE UPDATE
06/07/25 1846   Patient Assessment/Suction   Level of Consciousness (AVPU) alert   Respiratory Effort Unlabored   Expansion/Accessory Muscles/Retractions no use of accessory muscles;no retractions;expansion symmetric   All Lung Fields Breath Sounds coarse   Rhythm/Pattern, Respiratory unlabored;pattern regular;depth regular   Cough Frequency infrequent   Cough Type good;loose;nonproductive   PRE-TX-O2   Device (Oxygen Therapy) nasal cannula with humidification   $ Is the patient on Low Flow Oxygen? Yes   Flow (L/min) (Oxygen Therapy) 3   SpO2 100 %   Pulse Oximetry Type Continuous   $ Pulse Oximetry - Multiple Charge Pulse Oximetry - Multiple   Pulse 100   Resp 20   Aerosol Therapy   $ Aerosol Therapy Charges Aerosol Treatment   Respiratory Treatment Status (SVN) given   Treatment Route (SVN) air;mask   Patient Position HOB elevated   Post Treatment Assessment (SVN) increased aeration   Signs of Intolerance (SVN) none   Breath Sounds Post-Respiratory Treatment   Throughout All Fields Post-Treatment All Fields   Throughout All Fields Post-Treatment aeration increased   Post-treatment Heart Rate (beats/min) 85   Post-treatment Resp Rate (breaths/min) 19   General Safety Checklist   Safety Promotion/Fall Prevention side rails raised   Airway Safety   Is Ambu Bag and Mask with Patient? Yes, Adult Ambu Bag and Mask   Suction set is at the bedside? Yes   Respiratory Interventions   Cough And Deep Breathing done independently per patient   Preset CPAP/BiPAP Settings   Mode Of Delivery standby;home unit   CPAP/BIPAP charged w/in last 24 h NO   Education   $ Education Bronchodilator;Oxygen   Respiratory Evaluation   $ Care Plan Tech Time 15 min   Oxygen Care Plan   Oxygen Care Plan Per Protocol   SPO2 Goal (%) 92% non-cardiac   Rationale SpO2 is <MD Goal   Bronchodilator Care Plan   Bronchodilator Care Plan Aerosol   Aerosol Meds w/ frequency Other;Sodium Chloride 3% BID  (1.25mg xopenex q4)   Rationale Shortness of  breath (increased WOB)   Airway Clearance Care Plan   Airway Clearance Care Plan CPT   Frequency QID   Rationale Rhonchi

## 2025-06-07 NOTE — PLAN OF CARE
Problem: Breathing Pattern Ineffective  Goal: Effective Breathing Pattern  Outcome: Progressing     Problem: Airway Clearance Ineffective  Goal: Effective Airway Clearance  Outcome: Progressing

## 2025-06-07 NOTE — PROGRESS NOTES
[facility] Medicine   Progress Note  Room: 212/212   Patient Name: Jordin Allen Jr.  MRN: [id number]  Admit Date: 6/3/2025   Length of Stay:  LOS: 4 days   Attending Physician: Bernardino Guthrie MD  Nurse Practitioner: Ernestine Palomino NP    Date of Service: 06/07/2025    Principal Problem:    Acute respiratory failure with hypoxia    Brief HPI:     Jordin Allen Jr. is a 79 y.o. 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  He presented to [facility] ED on 01/22/2025 with complaints of shortness of breath and left foot redness.  Admitted to hospital medicine team for left foot cellulitis and acute hypoxic respiratory failure secondary to pneumonia/COPD exacerbation. CTA chest negative for PE. Emphysematous change with superimposed edema. Nodular focus within the anterior aspect of left upper lobe.  Started on nebs, prednisone, and empiric vanc/cefepime and doxycycline.  Respiratory panel positive for parainfluenza virus.  Course further complicated by sinus tach with PACs/18, for which he was started on diltiazem.  Blood cultures positive for staph, suspected to be contaminant.  Repeat blood cultures returned negative.  He will switch to Augmentin and doxy.  Ongoing episodes of SVT, thought to be due to underlying hypoxia.  He was able to be weaned down to high-flow nasal cannula 15 L.  Discharged to [facility] on 06/03/2025 for rehab, wound care, and O2 weaning.     Interval History    Able to be weaned down to 2 L nasal cannula this morning, O2 sats 93%   Still reporting poor appetite, increasing mirtazapine to 15 mg nightly   Having some right-sided shoulder pain today, giving oxycodone acetaminophen 5 mg      Subjective:     Review of Systems   Constitutional:  Positive for malaise/fatigue.   Respiratory:  Positive for cough,  sputum production and shortness of breath.    Cardiovascular:  Negative for chest pain and leg swelling.   Gastrointestinal:  Negative for heartburn, nausea and vomiting.   Genitourinary:  Negative for dysuria and urgency.   Musculoskeletal:  Positive for joint pain (R shoulder). Negative for myalgias.   Neurological:  Positive for weakness. Negative for dizziness.   Psychiatric/Behavioral:  Negative for depression. The patient does not have insomnia.          Objective:     Vital Sign Range  Temp:  [97.4 °F (36.3 °C)-98.8 °F (37.1 °C)]   Pulse:  []   Resp:  [11-27]   BP: (112-135)/(56-79)   SpO2:  [92 %-100 %]     Body mass index is 31.24 kg/m².           Intake/Output Summary (Last 24 hours) at 6/7/2025 0856  Last data filed at 6/6/2025 2147  Gross per 24 hour   Intake --   Output 500 ml   Net -500 ml       Physical Exam  Constitutional:       Appearance: He is ill-appearing.   HENT:      Head: Normocephalic and atraumatic.      Mouth/Throat:      Mouth: Mucous membranes are moist.      Pharynx: Oropharynx is clear.   Eyes:      Extraocular Movements: Extraocular movements intact.      Pupils: Pupils are equal, round, and reactive to light.   Cardiovascular:      Rate and Rhythm: Normal rate. Rhythm irregular.   Pulmonary:      Breath sounds: Wheezing and rhonchi present.   Abdominal:      General: Bowel sounds are normal.      Palpations: Abdomen is soft.   Musculoskeletal:      Right lower leg: Edema present.      Left lower leg: Edema present.   Skin:     General: Skin is warm and dry.   Neurological:      Mental Status: He is alert and oriented to person, place, and time. Mental status is at baseline.   Psychiatric:         Mood and Affect: Affect is flat.         Wounds       Wound 05/19/25 0233 Moisture associated dermatitis Left dorsal Foot (Active)   05/19/25 0233 Foot   Present on Original Admission: Y   Primary Wound Type: Moisture ass   Side: Left   Orientation: dorsal        Wound 05/19/25 0231  Moisture associated dermatitis Left lateral Foot (Active)   05/19/25 0231 Foot   Present on Original Admission: Y   Primary Wound Type: Moisture ass   Side: Left   Orientation: lateral        Wound 05/19/25 0231 Pressure Injury Right anterior Ankle (Active)   05/19/25 0231 Ankle   Present on Original Admission: Y   Primary Wound Type: Pressure inj   Side: Right   Orientation: anterior        Wound 05/19/25 0233 Moisture associated dermatitis Left anterior Foot (Active)   05/19/25 0233 Foot   Present on Original Admission:    Primary Wound Type: Moisture ass   Side: Left   Orientation: anterior        Wound 06/04/25 1030 Moisture associated dermatitis Right lateral Abdomen (Active)   06/04/25 1030 Abdomen   Present on Original Admission: Y   Primary Wound Type: Moisture ass   Side: Right   Orientation: lateral        Wound 06/04/25 1030 Moisture associated dermatitis Left lateral Abdomen (Active)   06/04/25 1030 Abdomen   Present on Original Admission: Y   Primary Wound Type: Moisture ass   Side: Left   Orientation: lateral        Wound 06/04/25 1030 Moisture associated dermatitis Right Groin (Active)   06/04/25 1030 Groin   Present on Original Admission: Y   Primary Wound Type: Moisture ass   Side: Right        Wound 06/04/25 1030 Moisture associated dermatitis Left Groin (Active)   06/04/25 1030 Groin   Present on Original Admission: Y   Primary Wound Type: Moisture ass   Side: Left        Wound 06/04/25 1030 Moisture associated dermatitis Sacral spine (Active)   06/04/25 1030 Sacral spine   Present on Original Admission: Y   Primary Wound Type: Moisture ass         Wounds consistently followed by [facility] NP Sheree Tao     Labs:  Recent Labs   Lab 06/03/25  0213 06/04/25  0851 06/06/25  0526   WBC 12.52 12.92* 12.15   HGB 12.5* 12.9* 12.1*   HCT 40.6 41.2 37.8*    144* 94*     Recent Labs   Lab 06/03/25  0213 06/04/25  0344 06/06/25  0526    142 138   K 4.3 4.0 3.6    104 97   CO2 29  "28 32*   BUN 40* 39* 27*   CREATININE 1.0 1.0 1.0   * 118* 106   CALCIUM 8.6* 8.4* 8.8   MG 1.9 1.8 1.8   PHOS 2.7 2.6* 2.1*     Recent Labs   Lab 06/03/25  0213 06/04/25  0344 06/06/25  0526   ALKPHOS 92 99 84   ALT 54* 53* 40   AST 19 25 19   ALBUMIN 2.5* 2.4* 2.3*   PROT 5.3* 5.2* 5.5*   BILITOT 0.6 0.5 0.9     No results for input(s): "POCTGLUCOSE" in the last 72 hours.    Meds Scheduled:   amitriptyline  25 mg Oral QHS    ammonium lactate   Topical (Top) Daily    aspirin  81 mg Oral QAM    atorvastatin  80 mg Oral Daily    diltiaZEM  120 mg Oral Q8H    enoxparin  40 mg Subcutaneous Daily    fluticasone propionate  2 spray Each Nostril Daily    furosemide  40 mg Oral Daily    guaiFENesin  1,200 mg Oral BID    levalbuterol  1.2495 mg Nebulization Q4H    levETIRAcetam  500 mg Oral BID    mirtazapine  7.5 mg Oral QHS    mupirocin   Nasal BID    pantoprazole  40 mg Oral Daily    roflumilast  500 mcg Oral Daily    sodium chloride 3%  4 mL Nebulization BID    tiotropium bromide  2 puff Inhalation Daily       Current Inpatient Problem List:  Active Hospital Problems    Diagnosis  POA    *Acute respiratory failure with hypoxia [J96.01]  Yes    Parainfluenza virus infection [B34.8]  Yes    Pneumonia [J18.9]  Yes    Cellulitis [L03.90]  Yes    TIA (transient ischemic attack) [G45.9]  Yes     ASA and statin      Metastasis to brain [C79.31]  Yes    Adenocarcinoma, lung, 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       CAD (coronary artery disease) [I25.10]  Yes     Chronic     -[facility] (10/31/13) for stemi: pLAD 100%, Cx with Li's, mRCA 40%, LVEF 55%,. LVEDP 15   -Xience 3.0 x 33 mm to pLAD, post dilated with 3.5 NC and 4.0 NC.   -TTE (8/14/13): LVEF 55%, grade I diastolic dysfunction      HTN (hypertension), benign [I10]  Yes    SHOLA (obstructive sleep apnea) [G47.33]  " Yes     CPAP at night      GERD (gastroesophageal reflux disease) [K21.9]  Yes     Continue PPI        Resolved Hospital Problems   No resolved problems to display.         Assessment / Plan:     Acute respiratory failure with hypoxia  COPD with acute exacerbation  Parainfluenza virus infection  Pneumonia   On Symbicort Spiriva and Daliresp at home. Follows up with pulmonology outpatient.   Completed 7 day course of Augmentin and doxycycline on 06/01  Continue Daliresp 500mcg daily  Continue Xopenex p.r.n.   Completed course of prednisone  Continue doxycycline 100 mg b.i.d.  Continue weaning high-flow nasal cannula  Continue furosemide 40 mg daily  Continue guaifenesin 1200 mg b.i.d.  Weaned to 2 L nasal cannula, O2 sats 93%      Cellulitis   Wound followed and managed by consistent, contracted [facility] NP  Completed 7 day course of doxy and Augmentin  Will need follow up with Podiatry after discharge     Restless leg syndrome   Continue amitriptyline 25 mg nightly     Adenocarcinoma, lung, left   Metastasis to brain  Completed treatment with chemo/XRT. Brain XRT for lesionsx2. off immunotherapy since 12/24  suspect the LLL area was consolidation of prior RXT changes and not malignancy and is nearly resolved.  CT on presentation with new SANJAY nodule, need op follow up with pulm  Continue prophylactic Keppra 500 mg b.i.d.    Compression fracture of L1 vertebrae with routine healing   TLSO brace when out of bed  F/u with Dr. Hopkins     Dyslipidemia  TIA  CAD   Continue aspirin   Continue atorvastatin 80 mg daily     Tachycardia   Episodes of SVT while at the hospital   Evaluated by Cardiology   Likely worsened by underlying hypoxia   Supplemental O2   Continue diltiazem 120 mg q.8 hours  Cardiac monitoring     SHOLA   Continue CPAP nightly     GERD  Continue PPI daily     Left lateral foot wound   Right anterior ankle wound   Traumatic left dorsal foot wound   Left anterior foot venous ulcer   Skin tear right distal  arm   Pressure injury nose  Wound followed and managed by consistent, contracted [facility] NP    Advance Care Planning, 06/05/2025  This was a voluntary visit and the option to decline counseling was given  Length of discussion:  16 minutes  Life limiting problem:  Respiratory failure, adenocarcinoma  Topics discussed:  Code status  Outcome of discussion:  Patient would like to remain a full code.  In the event that he is not able to make his own decisions, he would like to defer decision making to his daughter  Decision Maker:Jordin Allen     Psychiatric Assessment  Patient Health Questionnaire-9 (PHQ-9)    Date:  06/05/2025  Total Score: 15  Screening is Positive   Diagnosis and Treatment Plan:  Moderate MDD   Continue amitriptyline 25 mg nightly   Increasing mirtazapine to 15 mg nightly, which will hopefully help with his appetite as well       Diet:  Cardiac   GI Ppx:  PPI   DVT Ppx:  Enoxaparin     Lines:  PIV   Drains:  None   Airways:n/a   Wounds:  Multiple    Goals of Care:   Restorative,  Treat infection  Optimize nutrition  Wound healing  Muscle strengthening  Restoration of ADL's  Improve mobility    Anticipated Disposition:     6/21 home with     Follow up appointments:   Future Appointments   Date Time Provider Department Center   6/24/2025  7:15 AM [facility] OI-MRI2 [facility]   6/24/2025  9:30 AM Steven Campos MD [facility] NEUROSC [place]   6/24/2025  2:00 PM Bienvenido Henson MD [facility] RADONC3 [address]   7/1/2025 10:30 AM [facility] OIC-XRAY [facility] XRAY [facility]   7/1/2025 11:30 AM Bolivar Sr PA [facility] NEUROS8 [address]   7/8/2025  9:15 AM MEEKS, VISUAL FIELDS [facility] OPHTHAL [address]   7/8/2025 10:00 AM Cara Looney MD [facility] OPHTHAL [address]   7/15/2025 10:00 AM Garrett Lloyd DPM [facility] POD [address]   7/23/2025 10:15 AM CV [facility] ECHO [facility] [place]   8/15/2025  9:20 AM Bienvenido Ward MD [facility] PULMON [place]   8/27/2025  9:00 AM Guido Trejo  MD JAMAL [facility] ENT [address]   9/8/2025 11:10 AM Yan Palmer MD [facility] DERM [address] [place] NP  Hospital Medicine [facility]    I have spent 52 minutes reviewing patient records, examining, and  of the patient/ patient's family with greater than 50% of the time spent with direct patient care and coordination.

## 2025-06-07 NOTE — CARE UPDATE
06/06/25 2044   Patient Assessment/Suction   Level of Consciousness (AVPU) alert   Respiratory Effort Unlabored   Expansion/Accessory Muscles/Retractions no retractions;no use of accessory muscles   All Lung Fields Breath Sounds coarse;diminished   Cough Frequency frequent   Cough Type good;nonproductive   Skin Integrity   $ Wound Care Tech Time 15 min   Area Observed Left;Right;Behind ear   Skin Appearance without discoloration   PRE-TX-O2   Device (Oxygen Therapy) nasal cannula with humidification   $ Is the patient on High Flow Oxygen? Yes   Flow (L/min) (Oxygen Therapy) 4   Oxygen Analyzed Concentration (%) 40 %   SpO2 96 %   Pulse Oximetry Type Continuous   $ Pulse Oximetry - Multiple Charge Pulse Oximetry - Multiple   Probe Placed On (Pulse Ox) Right:;finger   Oximetry Probe Status Intact;Assessed   Pulse 96   Resp (!) 23   Positioning HOB elevated 30 degrees   Aerosol Therapy   $ Aerosol Therapy Charges Aerosol Treatment   Daily Review of Necessity (SVN) completed   Respiratory Treatment Status (SVN) given   Treatment Route (SVN) air;mask;oxygen   Patient Position HOB elevated   Post Treatment Assessment (SVN) breath sounds unchanged   Signs of Intolerance (SVN) none   Breath Sounds Post-Respiratory Treatment   Throughout All Fields Post-Treatment All Fields   Throughout All Fields Post-Treatment no change   Post-treatment Heart Rate (beats/min) 104   Post-treatment Resp Rate (breaths/min) 12   Chest Physiotherapy   $ Chest Physiotherapy Charges Subsequent   Daily Review of Necessity (CPT) completed   Type (CPT) percussion   Method (CPT) manual percussor   Patient Position (CPT) HOB elevated   Lung Fields (CPT) Anterior:   Duration per Field (CPT) 2 mins   CPT Total Time (Min) 5   Post Treatment Assessment (CPT) breath sounds unchanged   Signs of Intolerance (CPT) none   General Safety Checklist   Safety Promotion/Fall Prevention side rails raised   Airway Safety   Is Ambu Bag and Mask with Patient? Yes,  Adult Ambu Bag and Mask   Suction set is at the bedside? Yes   Equipment Change   $ RT Equipment sterile water   Respiratory Interventions   Cough And Deep Breathing done independently per patient   Airway/Ventilation Management airway patency maintained   Preset CPAP/BiPAP Settings   Mode Of Delivery CPAP;home unit   CPAP/BIPAP charged w/in last 24 h NO   $ Is patient using? Yes   Patient CPAP/BiPAP Settings   CPAP/BIPAP ID home cpap  unit   Education   $ Education Bronchodilator;15 min;Other (see comment)  (home cpap unit)   Transcutaneous Monitor   $ Care Plan Tech Time 15 min   Respiratory Evaluation   $ Care Plan Tech Time 15 min

## 2025-06-08 LAB
POCT GLUCOSE: 145 MG/DL (ref 70–110)
TROPONIN I SERPL DL<=0.01 NG/ML-MCNC: 10.8 NG/ML

## 2025-06-08 PROCEDURE — 84484 ASSAY OF TROPONIN QUANT: CPT | Performed by: STUDENT IN AN ORGANIZED HEALTH CARE EDUCATION/TRAINING PROGRAM

## 2025-06-08 PROCEDURE — 25000003 PHARM REV CODE 250: Performed by: STUDENT IN AN ORGANIZED HEALTH CARE EDUCATION/TRAINING PROGRAM

## 2025-06-08 PROCEDURE — 94640 AIRWAY INHALATION TREATMENT: CPT

## 2025-06-08 PROCEDURE — 11000001 HC ACUTE MED/SURG PRIVATE ROOM

## 2025-06-08 PROCEDURE — 93010 ELECTROCARDIOGRAM REPORT: CPT | Mod: ,,, | Performed by: INTERNAL MEDICINE

## 2025-06-08 PROCEDURE — 25000242 PHARM REV CODE 250 ALT 637 W/ HCPCS

## 2025-06-08 PROCEDURE — 99900035 HC TECH TIME PER 15 MIN (STAT)

## 2025-06-08 PROCEDURE — 93005 ELECTROCARDIOGRAM TRACING: CPT

## 2025-06-08 PROCEDURE — 25000003 PHARM REV CODE 250

## 2025-06-08 PROCEDURE — 25000242 PHARM REV CODE 250 ALT 637 W/ HCPCS: Performed by: STUDENT IN AN ORGANIZED HEALTH CARE EDUCATION/TRAINING PROGRAM

## 2025-06-08 PROCEDURE — 94668 MNPJ CHEST WALL SBSQ: CPT

## 2025-06-08 PROCEDURE — 27100171 HC OXYGEN HIGH FLOW UP TO 24 HOURS

## 2025-06-08 PROCEDURE — 99900031 HC PATIENT EDUCATION (STAT)

## 2025-06-08 PROCEDURE — 27000221 HC OXYGEN, UP TO 24 HOURS

## 2025-06-08 PROCEDURE — 63600175 PHARM REV CODE 636 W HCPCS

## 2025-06-08 PROCEDURE — 25000003 PHARM REV CODE 250: Performed by: HOSPITALIST

## 2025-06-08 PROCEDURE — 94761 N-INVAS EAR/PLS OXIMETRY MLT: CPT

## 2025-06-08 RX ORDER — METHOCARBAMOL 500 MG/1
500 TABLET, FILM COATED ORAL 3 TIMES DAILY PRN
Status: DISCONTINUED | OUTPATIENT
Start: 2025-06-08 | End: 2025-06-22

## 2025-06-08 RX ORDER — NITROGLYCERIN 0.4 MG/1
0.4 TABLET SUBLINGUAL EVERY 5 MIN PRN
Status: DISCONTINUED | OUTPATIENT
Start: 2025-06-08 | End: 2025-07-10 | Stop reason: HOSPADM

## 2025-06-08 RX ADMIN — LEVALBUTEROL HYDROCHLORIDE 1.25 MG: 0.63 SOLUTION RESPIRATORY (INHALATION) at 08:06

## 2025-06-08 RX ADMIN — ASPIRIN 81 MG: 81 TABLET, COATED ORAL at 09:06

## 2025-06-08 RX ADMIN — DILTIAZEM HYDROCHLORIDE 120 MG: 30 TABLET, FILM COATED ORAL at 09:06

## 2025-06-08 RX ADMIN — MUPIROCIN: 20 OINTMENT TOPICAL at 08:06

## 2025-06-08 RX ADMIN — GUAIFENESIN 1200 MG: 600 TABLET, MULTILAYER, EXTENDED RELEASE ORAL at 08:06

## 2025-06-08 RX ADMIN — MIRTAZAPINE 15 MG: 15 TABLET, FILM COATED ORAL at 08:06

## 2025-06-08 RX ADMIN — LEVETIRACETAM 500 MG: 500 TABLET, FILM COATED ORAL at 08:06

## 2025-06-08 RX ADMIN — LEVETIRACETAM 500 MG: 500 TABLET, FILM COATED ORAL at 09:06

## 2025-06-08 RX ADMIN — Medication: at 09:06

## 2025-06-08 RX ADMIN — ACETAMINOPHEN 650 MG: 325 TABLET, FILM COATED ORAL at 09:06

## 2025-06-08 RX ADMIN — PANTOPRAZOLE SODIUM 40 MG: 40 TABLET, DELAYED RELEASE ORAL at 09:06

## 2025-06-08 RX ADMIN — DILTIAZEM HYDROCHLORIDE 120 MG: 30 TABLET, FILM COATED ORAL at 05:06

## 2025-06-08 RX ADMIN — ENOXAPARIN SODIUM 40 MG: 40 INJECTION SUBCUTANEOUS at 05:06

## 2025-06-08 RX ADMIN — MUPIROCIN: 20 OINTMENT TOPICAL at 09:06

## 2025-06-08 RX ADMIN — NITROGLYCERIN 0.4 MG: 0.4 TABLET SUBLINGUAL at 06:06

## 2025-06-08 RX ADMIN — METHOCARBAMOL 500 MG: 500 TABLET ORAL at 12:06

## 2025-06-08 RX ADMIN — ROFLUMILAST 500 MCG: 500 TABLET ORAL at 09:06

## 2025-06-08 RX ADMIN — LEVALBUTEROL HYDROCHLORIDE 1.25 MG: 0.63 SOLUTION RESPIRATORY (INHALATION) at 11:06

## 2025-06-08 RX ADMIN — FUROSEMIDE 40 MG: 40 TABLET ORAL at 09:06

## 2025-06-08 RX ADMIN — Medication 4 ML: at 08:06

## 2025-06-08 RX ADMIN — GUAIFENESIN 1200 MG: 600 TABLET, MULTILAYER, EXTENDED RELEASE ORAL at 09:06

## 2025-06-08 RX ADMIN — LEVALBUTEROL HYDROCHLORIDE 1.25 MG: 0.63 SOLUTION RESPIRATORY (INHALATION) at 04:06

## 2025-06-08 RX ADMIN — TIOTROPIUM BROMIDE INHALATION SPRAY 2 PUFF: 3.12 SPRAY, METERED RESPIRATORY (INHALATION) at 08:06

## 2025-06-08 RX ADMIN — DILTIAZEM HYDROCHLORIDE 120 MG: 30 TABLET, FILM COATED ORAL at 03:06

## 2025-06-08 RX ADMIN — LEVALBUTEROL HYDROCHLORIDE 1.25 MG: 0.63 SOLUTION RESPIRATORY (INHALATION) at 02:06

## 2025-06-08 RX ADMIN — AMITRIPTYLINE HYDROCHLORIDE 25 MG: 25 TABLET, FILM COATED ORAL at 08:06

## 2025-06-08 RX ADMIN — FLUTICASONE PROPIONATE 100 MCG: 50 SPRAY, METERED NASAL at 09:06

## 2025-06-08 RX ADMIN — ATORVASTATIN CALCIUM 80 MG: 80 TABLET, FILM COATED ORAL at 09:06

## 2025-06-08 NOTE — RESPIRATORY THERAPY
Pt was ordered EKG @1815. RT performed one and charge nurse asked for it to be repeated. RT repeated EKG transmitted both EKG's.  Nurse was concerned with pt O2 and increased pt O2.  Rt changed pulse ox and readings improved. Nightshift RT placed pt on his home Cpap. RT gave report to oncoming therapist to monitor patient.

## 2025-06-08 NOTE — PROGRESS NOTES
[facility] Medicine   Progress Note  Room: 212/212   Patient Name: Jordin Allen Jr.  MRN: [id number]  Admit Date: 6/3/2025   Length of Stay:  LOS: 5 days   Attending Physician: Bernardino Guthrie MD  Nurse Practitioner: Ernestine Palomino NP    Date of Service: 06/08/2025    Principal Problem:    Acute respiratory failure with hypoxia    Brief HPI:     Jordin Allen Jr. is a 79 y.o. 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  He presented to [facility] ED on 01/22/2025 with complaints of shortness of breath and left foot redness.  Admitted to hospital medicine team for left foot cellulitis and acute hypoxic respiratory failure secondary to pneumonia/COPD exacerbation. CTA chest negative for PE. Emphysematous change with superimposed edema. Nodular focus within the anterior aspect of left upper lobe.  Started on nebs, prednisone, and empiric vanc/cefepime and doxycycline.  Respiratory panel positive for parainfluenza virus.  Course further complicated by sinus tach with PACs/18, for which he was started on diltiazem.  Blood cultures positive for staph, suspected to be contaminant.  Repeat blood cultures returned negative.  He will switch to Augmentin and doxy.  Ongoing episodes of SVT, thought to be due to underlying hypoxia.  He was able to be weaned down to high-flow nasal cannula 15 L.  Discharged to [facility] on 06/03/2025 for rehab, wound care, and O2 weaning.     Interval History    No issues overnight   Currently on 3 L nasal cannula, O2 sats 97%  Family at bedside   Having some shoulder pain to his right shoulder, which seems to be some muscle spasms.    Starting Robaxin 500 mg q.i.d. p.r.n.      Subjective:     Review of Systems   Constitutional:  Positive for malaise/fatigue.   Respiratory:  Positive for cough, sputum  production and shortness of breath.    Cardiovascular:  Negative for chest pain and leg swelling.   Gastrointestinal:  Negative for heartburn, nausea and vomiting.   Genitourinary:  Negative for dysuria and urgency.   Musculoskeletal:  Positive for joint pain (R shoulder). Negative for myalgias.   Neurological:  Positive for weakness. Negative for dizziness.   Psychiatric/Behavioral:  Negative for depression. The patient does not have insomnia.          Objective:     Vital Sign Range  Temp:  [97 °F (36.1 °C)-98.4 °F (36.9 °C)]   Pulse:  []   Resp:  [17-30]   BP: ()/(55-68)   SpO2:  [92 %-100 %]     Body mass index is 29.42 kg/m².  Oxygen Concentration (%):  [32] 32        Intake/Output Summary (Last 24 hours) at 6/8/2025 1041  Last data filed at 6/8/2025 1036  Gross per 24 hour   Intake 240 ml   Output 1200 ml   Net -960 ml       Physical Exam  Constitutional:       Appearance: He is ill-appearing.   HENT:      Head: Normocephalic and atraumatic.      Mouth/Throat:      Mouth: Mucous membranes are moist.      Pharynx: Oropharynx is clear.   Eyes:      Extraocular Movements: Extraocular movements intact.      Pupils: Pupils are equal, round, and reactive to light.   Cardiovascular:      Rate and Rhythm: Normal rate. Rhythm irregular.   Pulmonary:      Breath sounds: Wheezing and rhonchi present.   Abdominal:      General: Bowel sounds are normal.      Palpations: Abdomen is soft.   Musculoskeletal:      Right lower leg: Edema present.      Left lower leg: Edema present.   Skin:     General: Skin is warm and dry.   Neurological:      Mental Status: He is alert and oriented to person, place, and time. Mental status is at baseline.   Psychiatric:         Mood and Affect: Affect is flat.         Wounds       Wound 05/19/25 0233 Moisture associated dermatitis Left dorsal Foot (Active)   05/19/25 0233 Foot   Present on Original Admission: Y   Primary Wound Type: Moisture ass   Side: Left   Orientation: dorsal         Wound 05/19/25 0231 Moisture associated dermatitis Left lateral Foot (Active)   05/19/25 0231 Foot   Present on Original Admission: Y   Primary Wound Type: Moisture ass   Side: Left   Orientation: lateral        Wound 05/19/25 0231 Pressure Injury Right anterior Ankle (Active)   05/19/25 0231 Ankle   Present on Original Admission: Y   Primary Wound Type: Pressure inj   Side: Right   Orientation: anterior        Wound 05/19/25 0233 Moisture associated dermatitis Left anterior Foot (Active)   05/19/25 0233 Foot   Present on Original Admission:    Primary Wound Type: Moisture ass   Side: Left   Orientation: anterior        Wound 06/04/25 1030 Moisture associated dermatitis Right lateral Abdomen (Active)   06/04/25 1030 Abdomen   Present on Original Admission: Y   Primary Wound Type: Moisture ass   Side: Right   Orientation: lateral        Wound 06/04/25 1030 Moisture associated dermatitis Left lateral Abdomen (Active)   06/04/25 1030 Abdomen   Present on Original Admission: Y   Primary Wound Type: Moisture ass   Side: Left   Orientation: lateral        Wound 06/04/25 1030 Moisture associated dermatitis Right Groin (Active)   06/04/25 1030 Groin   Present on Original Admission: Y   Primary Wound Type: Moisture ass   Side: Right        Wound 06/04/25 1030 Moisture associated dermatitis Left Groin (Active)   06/04/25 1030 Groin   Present on Original Admission: Y   Primary Wound Type: Moisture ass   Side: Left        Wound 06/04/25 1030 Moisture associated dermatitis Sacral spine (Active)   06/04/25 1030 Sacral spine   Present on Original Admission: Y   Primary Wound Type: Moisture ass         Wounds consistently followed by [facility] Tao     Labs:  Recent Labs   Lab 06/03/25  0213 06/04/25  0851 06/06/25  0526   WBC 12.52 12.92* 12.15   HGB 12.5* 12.9* 12.1*   HCT 40.6 41.2 37.8*    144* 94*     Recent Labs   Lab 06/03/25  0213 06/04/25  0344 06/06/25  0526    142 138   K 4.3 4.0 3.6  "   104 97   CO2 29 28 32*   BUN 40* 39* 27*   CREATININE 1.0 1.0 1.0   * 118* 106   CALCIUM 8.6* 8.4* 8.8   MG 1.9 1.8 1.8   PHOS 2.7 2.6* 2.1*     Recent Labs   Lab 06/03/25  0213 06/04/25  0344 06/06/25  0526   ALKPHOS 92 99 84   ALT 54* 53* 40   AST 19 25 19   ALBUMIN 2.5* 2.4* 2.3*   PROT 5.3* 5.2* 5.5*   BILITOT 0.6 0.5 0.9     No results for input(s): "POCTGLUCOSE" in the last 72 hours.    Meds Scheduled:   amitriptyline  25 mg Oral QHS    ammonium lactate   Topical (Top) Daily    aspirin  81 mg Oral QAM    atorvastatin  80 mg Oral Daily    diltiaZEM  120 mg Oral Q8H    enoxparin  40 mg Subcutaneous Daily    fluticasone propionate  2 spray Each Nostril Daily    furosemide  40 mg Oral Daily    guaiFENesin  1,200 mg Oral BID    levalbuterol  1.2495 mg Nebulization Q4H    levETIRAcetam  500 mg Oral BID    mirtazapine  15 mg Oral QHS    mupirocin   Nasal BID    pantoprazole  40 mg Oral Daily    roflumilast  500 mcg Oral Daily    sodium chloride 3%  4 mL Nebulization BID    tiotropium bromide  2 puff Inhalation Daily       Current Inpatient Problem List:  Active Hospital Problems    Diagnosis  POA    *Acute respiratory failure with hypoxia [J96.01]  Yes    Parainfluenza virus infection [B34.8]  Yes    Pneumonia [J18.9]  Yes    Cellulitis [L03.90]  Yes    TIA (transient ischemic attack) [G45.9]  Yes     ASA and statin      Metastasis to brain [C79.31]  Yes    Adenocarcinoma, lung, 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       CAD (coronary artery disease) [I25.10]  Yes     Chronic     -[facility] (10/31/13) for stemi: pLAD 100%, Cx with Li's, mRCA 40%, LVEF 55%,. LVEDP 15   -Xience 3.0 x 33 mm to pLAD, post dilated with 3.5 NC and 4.0 NC.   -TTE (8/14/13): LVEF 55%, grade I diastolic dysfunction      HTN (hypertension), benign [I10]  Yes    SHOLA " (obstructive sleep apnea) [G47.33]  Yes     CPAP at night      GERD (gastroesophageal reflux disease) [K21.9]  Yes     Continue PPI        Resolved Hospital Problems   No resolved problems to display.         Assessment / Plan:     Acute respiratory failure with hypoxia  COPD with acute exacerbation  Parainfluenza virus infection  Pneumonia   On Symbicort Spiriva and Daliresp at home. Follows up with pulmonology outpatient.   Completed 7 day course of Augmentin and doxycycline on 06/01  Continue Daliresp 500mcg daily  Continue Xopenex p.r.n.   Completed course of prednisone  Continue doxycycline 100 mg b.i.d.  Continue weaning high-flow nasal cannula  Continue furosemide 40 mg daily  Continue guaifenesin 1200 mg b.i.d.  Currently on 3 L nasal cannula, O2 sats 97%     Cellulitis   Wound followed and managed by consistent, contracted [facility] NP  Completed 7 day course of doxy and Augmentin  Will need follow up with Podiatry after discharge     Restless leg syndrome   Continue amitriptyline 25 mg nightly     Adenocarcinoma, lung, left   Metastasis to brain  Completed treatment with chemo/XRT. Brain XRT for lesionsx2. off immunotherapy since 12/24  suspect the LLL area was consolidation of prior RXT changes and not malignancy and is nearly resolved.  CT on presentation with new SANJAY nodule, need op follow up with pulm  Continue prophylactic Keppra 500 mg b.i.d.    Right shoulder pain  Having some shoulder pain to his right shoulder, which seems to be some muscle spasms.    Starting Robaxin 500 mg q.i.d. p.r.n.    Compression fracture of L1 vertebrae with routine healing   TLSO brace when out of bed  F/u with Dr. Hopkins     Dyslipidemia  TIA  CAD   Continue aspirin   Continue atorvastatin 80 mg daily     Tachycardia   Episodes of SVT while at the hospital   Evaluated by Cardiology   Likely worsened by underlying hypoxia   Supplemental O2   Continue diltiazem 120 mg q.8 hours  Cardiac monitoring     SHOLA   Continue CPAP  nightly     GERD  Continue PPI daily     Left lateral foot wound   Right anterior ankle wound   Traumatic left dorsal foot wound   Left anterior foot venous ulcer   Skin tear right distal arm   Pressure injury nose  Wound followed and managed by consistent, contracted [facility] NP    Advance Care Planning, 06/05/2025  This was a voluntary visit and the option to decline counseling was given  Length of discussion:  16 minutes  Life limiting problem:  Respiratory failure, adenocarcinoma  Topics discussed:  Code status  Outcome of discussion:  Patient would like to remain a full code.  In the event that he is not able to make his own decisions, he would like to defer decision making to his daughter  Decision Maker:Jordin Allen     Psychiatric Assessment  Patient Health Questionnaire-9 (PHQ-9)    Date:  06/05/2025  Total Score: 15  Screening is Positive   Diagnosis and Treatment Plan:  Moderate MDD   Continue amitriptyline 25 mg nightly   Increasing mirtazapine to 15 mg nightly, which will hopefully help with his appetite as well       Diet:  Cardiac   GI Ppx:  PPI   DVT Ppx:  Enoxaparin     Lines:  PIV   Drains:  None   Airways:n/a   Wounds:  Multiple    Goals of Care:   Restorative,  Treat infection  Optimize nutrition  Wound healing  Muscle strengthening  Restoration of ADL's  Improve mobility    Anticipated Disposition:     6/21 home with     Follow up appointments:   Future Appointments   Date Time Provider Department Center   6/24/2025  7:15 AM [facility] OI-MRI2 [facility]   6/24/2025  9:30 AM Steven Campos MD [facility] NEUROSC [facility] Daley   6/24/2025  2:00 PM Bienvenido Henson MD [facility] RADONC3 [facility]   7/1/2025 10:30 AM [facility] OIC-XRAY [facility] XRAY [facility]   7/1/2025 11:30 AM Bolivar Sr PA [facility] NEUROS8 [facility]   7/8/2025  9:15 AM MEEKS, VISUAL FIELDS [facility] OPHTHAL [facility]   7/8/2025 10:00 AM Cara Looney MD [facility] OPHTHAL [facility]   7/15/2025 10:00 AM Garrett Lloyd  DPM [facility] POD [facility]   7/23/2025 10:15 AM CV [facility] ECHO [facility] C[facility]IA [place]   8/15/2025  9:20 AM Bienvenido Ward MD [facility] PULMON [place]   8/27/2025  9:00 AM Guido Trejo MD [facility] ENT [facility]   9/8/2025 11:10 AM Yan Palmer MD [facility] Palomino, NP  Hospital Medicine [facility]    I have spent 51 minutes reviewing patient records, examining, and  of the patient/ patient's family with greater than 50% of the time spent with direct patient care and coordination.

## 2025-06-08 NOTE — NURSING
EKG done at bedside, Sinus Tachycardia with premature supraventricular complexes, appears to be patients baseline compared to past EKGS. NP notified

## 2025-06-08 NOTE — PLAN OF CARE
"Pt remains AAOX4, pt medicated for R shoulder pain, (Tyleno-miminimal relief0 PRN muscle relaxer moderate/full relief. POC reviewed w pt re pain relief, pt encouraged to support R arm with pillow at night to reduce pressure on shoulder. Pt prefers high-fowlers position.     PO intake poor this shift, pt states " I just don't have an appetite. Daughter/friends visted, brought in beignets, pt still refused.     Good urine output, No BM this shift. NADN       Problem: Adult Inpatient Plan of Care  Goal: Plan of Care Review  Outcome: Progressing  Goal: Optimal Comfort and Wellbeing  Outcome: Progressing  Intervention: Monitor Pain and Promote Comfort  Flowsheets (Taken 6/8/2025 1611)  Pain Management Interventions:   care clustered   medication offered   pain management plan reviewed with patient/caregiver   position adjusted   pillow support provided     Problem: Pneumonia  Goal: Fluid Balance  Outcome: Progressing     Problem: Wound  Goal: Optimal Coping  Outcome: Progressing     "

## 2025-06-08 NOTE — CARE UPDATE
06/07/25 1859   Patient Assessment/Suction   Level of Consciousness (AVPU) alert   Respiratory Effort Unlabored   Expansion/Accessory Muscles/Retractions no use of accessory muscles;no retractions;expansion symmetric   All Lung Fields Breath Sounds coarse   PRE-TX-O2   Device (Oxygen Therapy) nasal cannula with humidification   Flow (L/min) (Oxygen Therapy) 3   Pulse Oximetry Type Continuous   Aerosol Therapy   $ Aerosol Therapy Charges Aerosol Treatment   Respiratory Treatment Status (SVN) given   Treatment Route (SVN) air;mask   Patient Position HOB elevated   Post Treatment Assessment (SVN) increased aeration   Signs of Intolerance (SVN) none   Breath Sounds Post-Respiratory Treatment   Throughout All Fields Post-Treatment All Fields   Throughout All Fields Post-Treatment aeration increased   Post-treatment Heart Rate (beats/min) 79   Post-treatment Resp Rate (breaths/min) 21

## 2025-06-09 ENCOUNTER — HOSPITAL ENCOUNTER (INPATIENT)
Facility: HOSPITAL | Age: 80
LOS: 2 days | Discharge: NURSING, REHAB OR LONG-TERM CARE | DRG: 193 | End: 2025-06-11
Attending: EMERGENCY MEDICINE | Admitting: INTERNAL MEDICINE
Payer: MEDICARE

## 2025-06-09 ENCOUNTER — PATIENT MESSAGE (OUTPATIENT)
Dept: AUDIOLOGY | Facility: CLINIC | Age: 80
End: 2025-06-09
Payer: MEDICARE

## 2025-06-09 DIAGNOSIS — R06.03 RESPIRATORY DISTRESS: ICD-10-CM

## 2025-06-09 DIAGNOSIS — I48.92 ATRIAL FLUTTER WITH RAPID VENTRICULAR RESPONSE: ICD-10-CM

## 2025-06-09 DIAGNOSIS — R07.9 CHEST PAIN: ICD-10-CM

## 2025-06-09 DIAGNOSIS — I48.92 ATRIAL FLUTTER: ICD-10-CM

## 2025-06-09 DIAGNOSIS — I48.91 A-FIB: ICD-10-CM

## 2025-06-09 DIAGNOSIS — R06.02 SOB (SHORTNESS OF BREATH): ICD-10-CM

## 2025-06-09 DIAGNOSIS — J18.9 PNEUMONIA: ICD-10-CM

## 2025-06-09 DIAGNOSIS — J18.9 PNEUMONIA DUE TO INFECTIOUS ORGANISM, UNSPECIFIED LATERALITY, UNSPECIFIED PART OF LUNG: Primary | ICD-10-CM

## 2025-06-09 PROBLEM — J96.21 ACUTE ON CHRONIC RESPIRATORY FAILURE WITH HYPOXIA: Status: ACTIVE | Noted: 2022-12-04

## 2025-06-09 LAB
ABSOLUTE EOSINOPHIL (OHS): 0.02 K/UL
ABSOLUTE EOSINOPHIL (OHS): 0.03 K/UL
ABSOLUTE MONOCYTE (OHS): 1.74 K/UL (ref 0.3–1)
ABSOLUTE MONOCYTE (OHS): 1.75 K/UL (ref 0.3–1)
ABSOLUTE NEUTROPHIL COUNT (OHS): 11.86 K/UL (ref 1.8–7.7)
ABSOLUTE NEUTROPHIL COUNT (OHS): 12.99 K/UL (ref 1.8–7.7)
ALBUMIN SERPL BCP-MCNC: 2.2 G/DL (ref 3.5–5.2)
ALBUMIN SERPL BCP-MCNC: 2.3 G/DL (ref 3.5–5.2)
ALLENS TEST: ABNORMAL
ALP SERPL-CCNC: 124 UNIT/L (ref 40–150)
ALP SERPL-CCNC: 129 UNIT/L (ref 40–150)
ALT SERPL W/O P-5'-P-CCNC: 62 UNIT/L (ref 10–44)
ALT SERPL W/O P-5'-P-CCNC: 63 UNIT/L (ref 10–44)
ANION GAP (OHS): 13 MMOL/L (ref 8–16)
ANION GAP (OHS): 15 MMOL/L (ref 8–16)
AST SERPL-CCNC: 35 UNIT/L (ref 11–45)
AST SERPL-CCNC: 37 UNIT/L (ref 11–45)
B PERT DNA NPH QL NAA+PROBE: NOT DETECTED
BACTERIA #/AREA URNS AUTO: ABNORMAL /HPF
BASOPHILS # BLD AUTO: 0.04 K/UL
BASOPHILS # BLD AUTO: 0.04 K/UL
BASOPHILS NFR BLD AUTO: 0.3 %
BASOPHILS NFR BLD AUTO: 0.3 %
BILIRUB SERPL-MCNC: 0.7 MG/DL (ref 0.1–1)
BILIRUB SERPL-MCNC: 0.8 MG/DL (ref 0.1–1)
BILIRUB UR QL STRIP.AUTO: NEGATIVE
BNP SERPL-MCNC: 62 PG/ML (ref 0–99)
BUN SERPL-MCNC: 25 MG/DL (ref 8–23)
BUN SERPL-MCNC: 26 MG/DL (ref 8–23)
C PNEUM DNA LOWER RESP QL NAA+NON-PROBE: NOT DETECTED
CALCIUM SERPL-MCNC: 9 MG/DL (ref 8.7–10.5)
CALCIUM SERPL-MCNC: 9 MG/DL (ref 8.7–10.5)
CHLORIDE SERPL-SCNC: 87 MMOL/L (ref 95–110)
CHLORIDE SERPL-SCNC: 88 MMOL/L (ref 95–110)
CLARITY UR: CLEAR
CO2 SERPL-SCNC: 32 MMOL/L (ref 23–29)
CO2 SERPL-SCNC: 34 MMOL/L (ref 23–29)
COLOR UR AUTO: YELLOW
CREAT SERPL-MCNC: 0.9 MG/DL (ref 0.5–1.4)
CREAT SERPL-MCNC: 0.9 MG/DL (ref 0.5–1.4)
DELSYS: ABNORMAL
ERYTHROCYTE [DISTWIDTH] IN BLOOD BY AUTOMATED COUNT: 16 % (ref 11.5–14.5)
ERYTHROCYTE [DISTWIDTH] IN BLOOD BY AUTOMATED COUNT: 16.1 % (ref 11.5–14.5)
FLUAV RNA NPH QL NAA+NON-PROBE: NOT DETECTED
FLUBV RNA NPH QL NAA+NON-PROBE: NOT DETECTED
GFR SERPLBLD CREATININE-BSD FMLA CKD-EPI: >60 ML/MIN/1.73/M2
GFR SERPLBLD CREATININE-BSD FMLA CKD-EPI: >60 ML/MIN/1.73/M2
GLUCOSE SERPL-MCNC: 109 MG/DL (ref 70–110)
GLUCOSE SERPL-MCNC: 98 MG/DL (ref 70–110)
GLUCOSE UR QL STRIP: NEGATIVE
HADV DNA NPH QL NAA+NON-PROBE: NOT DETECTED
HCO3 UR-SCNC: 43.8 MMOL/L (ref 24–28)
HCOV 229E RNA NPH QL NAA+NON-PROBE: NOT DETECTED
HCOV HKU1 RNA NPH QL NAA+NON-PROBE: NOT DETECTED
HCOV NL63 RNA NPH QL NAA+NON-PROBE: NOT DETECTED
HCOV OC43 RNA NPH QL NAA+NON-PROBE: NOT DETECTED
HCT VFR BLD AUTO: 36.8 % (ref 40–54)
HCT VFR BLD AUTO: 36.9 % (ref 40–54)
HGB BLD-MCNC: 11.9 GM/DL (ref 14–18)
HGB BLD-MCNC: 11.9 GM/DL (ref 14–18)
HGB UR QL STRIP: ABNORMAL
HMPV RNA LOWER RESP QL NAA+NON-PROBE: NOT DETECTED
HMPV RNA NPH QL NAA+NON-PROBE: NOT DETECTED
HOLD SPECIMEN: NORMAL
HPIV1 RNA NPH QL NAA+NON-PROBE: NOT DETECTED
HPIV2 RNA NPH QL NAA+NON-PROBE: NOT DETECTED
HPIV3 RNA NPH QL NAA+NON-PROBE: DETECTED
HPIV4 RNA NPH QL NAA+NON-PROBE: NOT DETECTED
HYALINE CASTS UR QL AUTO: 2 /LPF (ref 0–1)
IMM GRANULOCYTES # BLD AUTO: 0.34 K/UL (ref 0–0.04)
IMM GRANULOCYTES # BLD AUTO: 0.38 K/UL (ref 0–0.04)
IMM GRANULOCYTES NFR BLD AUTO: 2.2 % (ref 0–0.5)
IMM GRANULOCYTES NFR BLD AUTO: 2.6 % (ref 0–0.5)
INFLUENZA A MOLECULAR (OHS): NEGATIVE
INFLUENZA B MOLECULAR (OHS): NEGATIVE
KETONES UR QL STRIP: ABNORMAL
LACTATE SERPL-SCNC: 0.9 MMOL/L (ref 0.5–2.2)
LEUKOCYTE ESTERASE UR QL STRIP: NEGATIVE
LYMPHOCYTES # BLD AUTO: 0.37 K/UL (ref 1–4.8)
LYMPHOCYTES # BLD AUTO: 0.41 K/UL (ref 1–4.8)
MAGNESIUM SERPL-MCNC: 1.9 MG/DL (ref 1.6–2.6)
MCH RBC QN AUTO: 28.1 PG (ref 27–31)
MCH RBC QN AUTO: 28.1 PG (ref 27–31)
MCHC RBC AUTO-ENTMCNC: 32.2 G/DL (ref 32–36)
MCHC RBC AUTO-ENTMCNC: 32.3 G/DL (ref 32–36)
MCV RBC AUTO: 87 FL (ref 82–98)
MCV RBC AUTO: 87 FL (ref 82–98)
MICROSCOPIC COMMENT: ABNORMAL
NITRITE UR QL STRIP: NEGATIVE
NUCLEATED RBC (/100WBC) (OHS): 0 /100 WBC
NUCLEATED RBC (/100WBC) (OHS): 0 /100 WBC
OHS QRS DURATION: 72 MS
OHS QTC CALCULATION: 454 MS
PCO2 BLDA: 56.3 MMHG (ref 35–45)
PH SMN: 7.5 [PH] (ref 7.35–7.45)
PH UR STRIP: 6 [PH]
PHOSPHATE SERPL-MCNC: 2.4 MG/DL (ref 2.7–4.5)
PLATELET # BLD AUTO: 129 K/UL (ref 150–450)
PLATELET # BLD AUTO: 131 K/UL (ref 150–450)
PMV BLD AUTO: 10.9 FL (ref 9.2–12.9)
PMV BLD AUTO: 11.8 FL (ref 9.2–12.9)
PO2 BLDA: 79 MMHG (ref 80–100)
POC BE: 21 MMOL/L (ref -2–2)
POC SATURATED O2: 96 % (ref 95–100)
POC TCO2: 45 MMOL/L (ref 23–27)
POTASSIUM SERPL-SCNC: 3.5 MMOL/L (ref 3.5–5.1)
POTASSIUM SERPL-SCNC: 3.6 MMOL/L (ref 3.5–5.1)
PROCALCITONIN SERPL-MCNC: 0.43 NG/ML
PROT SERPL-MCNC: 6.2 GM/DL (ref 6–8.4)
PROT SERPL-MCNC: 6.2 GM/DL (ref 6–8.4)
PROT UR QL STRIP: ABNORMAL
RBC # BLD AUTO: 4.23 M/UL (ref 4.6–6.2)
RBC # BLD AUTO: 4.24 M/UL (ref 4.6–6.2)
RBC #/AREA URNS AUTO: 10 /HPF (ref 0–4)
RELATIVE EOSINOPHIL (OHS): 0.1 %
RELATIVE EOSINOPHIL (OHS): 0.2 %
RELATIVE LYMPHOCYTE (OHS): 2.6 % (ref 18–48)
RELATIVE LYMPHOCYTE (OHS): 2.6 % (ref 18–48)
RELATIVE MONOCYTE (OHS): 11.3 % (ref 4–15)
RELATIVE MONOCYTE (OHS): 12.1 % (ref 4–15)
RELATIVE NEUTROPHIL (OHS): 82.2 % (ref 38–73)
RELATIVE NEUTROPHIL (OHS): 83.5 % (ref 38–73)
RSV RNA NPH QL NAA+NON-PROBE: NOT DETECTED
RSV RNA NPH QL NAA+NON-PROBE: NOT DETECTED
RV+EV RNA NPH QL NAA+NON-PROBE: NOT DETECTED
SAMPLE: ABNORMAL
SARS-COV-2 RDRP RESP QL NAA+PROBE: NEGATIVE
SARS-COV-2 RNA RESP QL NAA+PROBE: NOT DETECTED
SITE: ABNORMAL
SODIUM SERPL-SCNC: 134 MMOL/L (ref 136–145)
SODIUM SERPL-SCNC: 135 MMOL/L (ref 136–145)
SP GR UR STRIP: 1.01
SPECIMEN SOURCE: ABNORMAL
TROPONIN I SERPL HS-MCNC: 11 NG/L
TROPONIN I SERPL HS-MCNC: 12 NG/L
UROBILINOGEN UR STRIP-ACNC: NEGATIVE EU/DL
WBC # BLD AUTO: 14.42 K/UL (ref 3.9–12.7)
WBC # BLD AUTO: 15.55 K/UL (ref 3.9–12.7)
WBC #/AREA URNS AUTO: 4 /HPF (ref 0–5)

## 2025-06-09 PROCEDURE — 25000003 PHARM REV CODE 250

## 2025-06-09 PROCEDURE — 82803 BLOOD GASES ANY COMBINATION: CPT

## 2025-06-09 PROCEDURE — 27000249 HC VAPOTHERM CIRCUIT

## 2025-06-09 PROCEDURE — 25000242 PHARM REV CODE 250 ALT 637 W/ HCPCS: Performed by: EMERGENCY MEDICINE

## 2025-06-09 PROCEDURE — 0202U NFCT DS 22 TRGT SARS-COV-2: CPT

## 2025-06-09 PROCEDURE — 84484 ASSAY OF TROPONIN QUANT: CPT

## 2025-06-09 PROCEDURE — 81003 URINALYSIS AUTO W/O SCOPE: CPT

## 2025-06-09 PROCEDURE — 82040 ASSAY OF SERUM ALBUMIN: CPT | Performed by: EMERGENCY MEDICINE

## 2025-06-09 PROCEDURE — 99285 EMERGENCY DEPT VISIT HI MDM: CPT | Mod: 25

## 2025-06-09 PROCEDURE — 83880 ASSAY OF NATRIURETIC PEPTIDE: CPT | Performed by: EMERGENCY MEDICINE

## 2025-06-09 PROCEDURE — 96374 THER/PROPH/DIAG INJ IV PUSH: CPT

## 2025-06-09 PROCEDURE — 5A0935A ASSISTANCE WITH RESPIRATORY VENTILATION, LESS THAN 24 CONSECUTIVE HOURS, HIGH NASAL FLOW/VELOCITY: ICD-10-PCS | Performed by: INTERNAL MEDICINE

## 2025-06-09 PROCEDURE — 94640 AIRWAY INHALATION TREATMENT: CPT

## 2025-06-09 PROCEDURE — 5A09457 ASSISTANCE WITH RESPIRATORY VENTILATION, 24-96 CONSECUTIVE HOURS, CONTINUOUS POSITIVE AIRWAY PRESSURE: ICD-10-PCS | Performed by: INTERNAL MEDICINE

## 2025-06-09 PROCEDURE — 94761 N-INVAS EAR/PLS OXIMETRY MLT: CPT | Mod: XB

## 2025-06-09 PROCEDURE — 84145 PROCALCITONIN (PCT): CPT

## 2025-06-09 PROCEDURE — U0002 COVID-19 LAB TEST NON-CDC: HCPCS | Performed by: EMERGENCY MEDICINE

## 2025-06-09 PROCEDURE — 99900031 HC PATIENT EDUCATION (STAT)

## 2025-06-09 PROCEDURE — 27000190 HC CPAP FULL FACE MASK W/VALVE

## 2025-06-09 PROCEDURE — 94761 N-INVAS EAR/PLS OXIMETRY MLT: CPT

## 2025-06-09 PROCEDURE — 27000200 HC HIGH FLOW DEL DISP CIRCUIT

## 2025-06-09 PROCEDURE — 25000242 PHARM REV CODE 250 ALT 637 W/ HCPCS

## 2025-06-09 PROCEDURE — 87502 INFLUENZA DNA AMP PROBE: CPT | Performed by: EMERGENCY MEDICINE

## 2025-06-09 PROCEDURE — 93005 ELECTROCARDIOGRAM TRACING: CPT

## 2025-06-09 PROCEDURE — 27100171 HC OXYGEN HIGH FLOW UP TO 24 HOURS

## 2025-06-09 PROCEDURE — 84100 ASSAY OF PHOSPHORUS: CPT | Performed by: STUDENT IN AN ORGANIZED HEALTH CARE EDUCATION/TRAINING PROGRAM

## 2025-06-09 PROCEDURE — 83735 ASSAY OF MAGNESIUM: CPT | Performed by: STUDENT IN AN ORGANIZED HEALTH CARE EDUCATION/TRAINING PROGRAM

## 2025-06-09 PROCEDURE — 25500020 PHARM REV CODE 255: Performed by: EMERGENCY MEDICINE

## 2025-06-09 PROCEDURE — 93010 ELECTROCARDIOGRAM REPORT: CPT | Mod: ,,, | Performed by: INTERNAL MEDICINE

## 2025-06-09 PROCEDURE — 84484 ASSAY OF TROPONIN QUANT: CPT | Performed by: EMERGENCY MEDICINE

## 2025-06-09 PROCEDURE — 63600175 PHARM REV CODE 636 W HCPCS

## 2025-06-09 PROCEDURE — 99900035 HC TECH TIME PER 15 MIN (STAT)

## 2025-06-09 PROCEDURE — 83605 ASSAY OF LACTIC ACID: CPT | Performed by: EMERGENCY MEDICINE

## 2025-06-09 PROCEDURE — 18500000 HC LEAVE OF ABSENCE HOSPITAL SERVICES

## 2025-06-09 PROCEDURE — 87040 BLOOD CULTURE FOR BACTERIA: CPT | Performed by: EMERGENCY MEDICINE

## 2025-06-09 PROCEDURE — 63600175 PHARM REV CODE 636 W HCPCS: Performed by: EMERGENCY MEDICINE

## 2025-06-09 PROCEDURE — 94660 CPAP INITIATION&MGMT: CPT

## 2025-06-09 PROCEDURE — 25000003 PHARM REV CODE 250: Performed by: EMERGENCY MEDICINE

## 2025-06-09 PROCEDURE — 85025 COMPLETE CBC W/AUTO DIFF WBC: CPT | Performed by: EMERGENCY MEDICINE

## 2025-06-09 PROCEDURE — 85025 COMPLETE CBC W/AUTO DIFF WBC: CPT | Performed by: STUDENT IN AN ORGANIZED HEALTH CARE EDUCATION/TRAINING PROGRAM

## 2025-06-09 PROCEDURE — 11000001 HC ACUTE MED/SURG PRIVATE ROOM

## 2025-06-09 PROCEDURE — 82040 ASSAY OF SERUM ALBUMIN: CPT | Performed by: STUDENT IN AN ORGANIZED HEALTH CARE EDUCATION/TRAINING PROGRAM

## 2025-06-09 PROCEDURE — 27000221 HC OXYGEN, UP TO 24 HOURS

## 2025-06-09 RX ORDER — GUAIFENESIN 600 MG/1
600 TABLET, EXTENDED RELEASE ORAL 2 TIMES DAILY
Status: DISCONTINUED | OUTPATIENT
Start: 2025-06-09 | End: 2025-06-11 | Stop reason: HOSPADM

## 2025-06-09 RX ORDER — SODIUM CHLORIDE 0.9 % (FLUSH) 0.9 %
10 SYRINGE (ML) INJECTION EVERY 12 HOURS PRN
Status: DISCONTINUED | OUTPATIENT
Start: 2025-06-09 | End: 2025-06-09

## 2025-06-09 RX ORDER — PANTOPRAZOLE SODIUM 40 MG/1
40 TABLET, DELAYED RELEASE ORAL DAILY
Status: DISCONTINUED | OUTPATIENT
Start: 2025-06-09 | End: 2025-06-11 | Stop reason: HOSPADM

## 2025-06-09 RX ORDER — AMITRIPTYLINE HYDROCHLORIDE 25 MG/1
25 TABLET, FILM COATED ORAL NIGHTLY
Status: DISCONTINUED | OUTPATIENT
Start: 2025-06-09 | End: 2025-06-11 | Stop reason: HOSPADM

## 2025-06-09 RX ORDER — FLUTICASONE PROPIONATE 50 MCG
2 SPRAY, SUSPENSION (ML) NASAL DAILY
Status: DISCONTINUED | OUTPATIENT
Start: 2025-06-09 | End: 2025-06-11 | Stop reason: HOSPADM

## 2025-06-09 RX ORDER — IBUPROFEN 200 MG
16 TABLET ORAL
Status: DISCONTINUED | OUTPATIENT
Start: 2025-06-09 | End: 2025-06-11 | Stop reason: HOSPADM

## 2025-06-09 RX ORDER — LEVETIRACETAM 500 MG/1
500 TABLET ORAL 2 TIMES DAILY
Status: DISCONTINUED | OUTPATIENT
Start: 2025-06-09 | End: 2025-06-11 | Stop reason: HOSPADM

## 2025-06-09 RX ORDER — TALC
6 POWDER (GRAM) TOPICAL NIGHTLY PRN
Status: DISCONTINUED | OUTPATIENT
Start: 2025-06-09 | End: 2025-06-11 | Stop reason: HOSPADM

## 2025-06-09 RX ORDER — DEXAMETHASONE SODIUM PHOSPHATE 4 MG/ML
10 INJECTION, SOLUTION INTRA-ARTICULAR; INTRALESIONAL; INTRAMUSCULAR; INTRAVENOUS; SOFT TISSUE
Status: COMPLETED | OUTPATIENT
Start: 2025-06-09 | End: 2025-06-09

## 2025-06-09 RX ORDER — SODIUM CHLORIDE 0.9 % (FLUSH) 0.9 %
10 SYRINGE (ML) INJECTION
Status: DISCONTINUED | OUTPATIENT
Start: 2025-06-09 | End: 2025-06-11 | Stop reason: HOSPADM

## 2025-06-09 RX ORDER — FUROSEMIDE 20 MG/1
20 TABLET ORAL DAILY
Status: DISCONTINUED | OUTPATIENT
Start: 2025-06-10 | End: 2025-06-09

## 2025-06-09 RX ORDER — VANCOMYCIN 1.75 GRAM/500 ML IN 0.9 % SODIUM CHLORIDE INTRAVENOUS
20 ONCE
Status: COMPLETED | OUTPATIENT
Start: 2025-06-09 | End: 2025-06-09

## 2025-06-09 RX ORDER — POLYETHYLENE GLYCOL 3350 17 G/17G
17 POWDER, FOR SOLUTION ORAL DAILY
Status: DISCONTINUED | OUTPATIENT
Start: 2025-06-09 | End: 2025-06-11 | Stop reason: HOSPADM

## 2025-06-09 RX ORDER — IBUPROFEN 200 MG
24 TABLET ORAL
Status: DISCONTINUED | OUTPATIENT
Start: 2025-06-09 | End: 2025-06-11 | Stop reason: HOSPADM

## 2025-06-09 RX ORDER — NALOXONE HCL 0.4 MG/ML
0.02 VIAL (ML) INJECTION
Status: DISCONTINUED | OUTPATIENT
Start: 2025-06-09 | End: 2025-06-11 | Stop reason: HOSPADM

## 2025-06-09 RX ORDER — LOSARTAN POTASSIUM 50 MG/1
100 TABLET ORAL DAILY
Status: DISCONTINUED | OUTPATIENT
Start: 2025-06-10 | End: 2025-06-11 | Stop reason: HOSPADM

## 2025-06-09 RX ORDER — POLYETHYLENE GLYCOL 3350 17 G/17G
17 POWDER, FOR SOLUTION ORAL DAILY PRN
Status: DISCONTINUED | OUTPATIENT
Start: 2025-06-09 | End: 2025-06-11 | Stop reason: HOSPADM

## 2025-06-09 RX ORDER — BENZONATATE 100 MG/1
100 CAPSULE ORAL 3 TIMES DAILY PRN
Status: DISCONTINUED | OUTPATIENT
Start: 2025-06-09 | End: 2025-06-11 | Stop reason: HOSPADM

## 2025-06-09 RX ORDER — ROFLUMILAST 500 UG/1
500 TABLET ORAL EVERY MORNING
Status: DISCONTINUED | OUTPATIENT
Start: 2025-06-10 | End: 2025-06-11 | Stop reason: HOSPADM

## 2025-06-09 RX ORDER — PREDNISONE 50 MG/1
50 TABLET ORAL DAILY
Status: DISCONTINUED | OUTPATIENT
Start: 2025-06-09 | End: 2025-06-11 | Stop reason: HOSPADM

## 2025-06-09 RX ORDER — ATORVASTATIN CALCIUM 40 MG/1
80 TABLET, FILM COATED ORAL DAILY
Status: DISCONTINUED | OUTPATIENT
Start: 2025-06-09 | End: 2025-06-11 | Stop reason: HOSPADM

## 2025-06-09 RX ORDER — SODIUM,POTASSIUM PHOSPHATES 280-250MG
1 POWDER IN PACKET (EA) ORAL EVERY 4 HOURS
Status: COMPLETED | OUTPATIENT
Start: 2025-06-09 | End: 2025-06-10

## 2025-06-09 RX ORDER — IPRATROPIUM BROMIDE AND ALBUTEROL SULFATE 2.5; .5 MG/3ML; MG/3ML
3 SOLUTION RESPIRATORY (INHALATION) EVERY 6 HOURS
Status: DISCONTINUED | OUTPATIENT
Start: 2025-06-09 | End: 2025-06-11

## 2025-06-09 RX ORDER — PROCHLORPERAZINE EDISYLATE 5 MG/ML
5 INJECTION INTRAMUSCULAR; INTRAVENOUS EVERY 6 HOURS PRN
Status: DISCONTINUED | OUTPATIENT
Start: 2025-06-09 | End: 2025-06-11 | Stop reason: HOSPADM

## 2025-06-09 RX ORDER — ACETAMINOPHEN 325 MG/1
650 TABLET ORAL EVERY 4 HOURS PRN
Status: DISCONTINUED | OUTPATIENT
Start: 2025-06-09 | End: 2025-06-11 | Stop reason: HOSPADM

## 2025-06-09 RX ORDER — SODIUM CHLORIDE 0.9 % (FLUSH) 0.9 %
3 SYRINGE (ML) INJECTION
Status: DISCONTINUED | OUTPATIENT
Start: 2025-06-09 | End: 2025-06-11 | Stop reason: HOSPADM

## 2025-06-09 RX ORDER — GLUCAGON 1 MG
1 KIT INJECTION
Status: DISCONTINUED | OUTPATIENT
Start: 2025-06-09 | End: 2025-06-11 | Stop reason: HOSPADM

## 2025-06-09 RX ORDER — ACETAMINOPHEN 500 MG
1000 TABLET ORAL EVERY 8 HOURS PRN
Status: DISCONTINUED | OUTPATIENT
Start: 2025-06-09 | End: 2025-06-11 | Stop reason: HOSPADM

## 2025-06-09 RX ORDER — ONDANSETRON 4 MG/1
8 TABLET, ORALLY DISINTEGRATING ORAL EVERY 8 HOURS PRN
Status: DISCONTINUED | OUTPATIENT
Start: 2025-06-09 | End: 2025-06-11 | Stop reason: HOSPADM

## 2025-06-09 RX ORDER — ASPIRIN 81 MG/1
81 TABLET ORAL EVERY MORNING
Status: DISCONTINUED | OUTPATIENT
Start: 2025-06-10 | End: 2025-06-11 | Stop reason: HOSPADM

## 2025-06-09 RX ORDER — TRAZODONE HYDROCHLORIDE 50 MG/1
50 TABLET ORAL NIGHTLY
Status: DISCONTINUED | OUTPATIENT
Start: 2025-06-09 | End: 2025-06-11 | Stop reason: HOSPADM

## 2025-06-09 RX ORDER — IPRATROPIUM BROMIDE AND ALBUTEROL SULFATE 2.5; .5 MG/3ML; MG/3ML
3 SOLUTION RESPIRATORY (INHALATION)
Status: COMPLETED | OUTPATIENT
Start: 2025-06-09 | End: 2025-06-09

## 2025-06-09 RX ADMIN — AMITRIPTYLINE HYDROCHLORIDE 25 MG: 25 TABLET, FILM COATED ORAL at 09:06

## 2025-06-09 RX ADMIN — PIPERACILLIN SODIUM AND TAZOBACTAM SODIUM 4.5 G: 4; .5 INJECTION, POWDER, FOR SOLUTION INTRAVENOUS at 12:06

## 2025-06-09 RX ADMIN — SODIUM CHLORIDE 1750 MG: 9 INJECTION, SOLUTION INTRAVENOUS at 02:06

## 2025-06-09 RX ADMIN — LEVALBUTEROL HYDROCHLORIDE 1.25 MG: 0.63 SOLUTION RESPIRATORY (INHALATION) at 07:06

## 2025-06-09 RX ADMIN — TRAZODONE HYDROCHLORIDE 50 MG: 50 TABLET ORAL at 09:06

## 2025-06-09 RX ADMIN — DILTIAZEM HYDROCHLORIDE 120 MG: 30 TABLET, FILM COATED ORAL at 06:06

## 2025-06-09 RX ADMIN — IOHEXOL 100 ML: 350 INJECTION, SOLUTION INTRAVENOUS at 10:06

## 2025-06-09 RX ADMIN — ATORVASTATIN CALCIUM 80 MG: 40 TABLET, FILM COATED ORAL at 02:06

## 2025-06-09 RX ADMIN — POTASSIUM & SODIUM PHOSPHATES POWDER PACK 280-160-250 MG 1 PACKET: 280-160-250 PACK at 06:06

## 2025-06-09 RX ADMIN — IPRATROPIUM BROMIDE AND ALBUTEROL SULFATE 3 ML: 2.5; .5 SOLUTION RESPIRATORY (INHALATION) at 10:06

## 2025-06-09 RX ADMIN — IPRATROPIUM BROMIDE AND ALBUTEROL SULFATE 3 ML: 2.5; .5 SOLUTION RESPIRATORY (INHALATION) at 07:06

## 2025-06-09 RX ADMIN — POLYETHYLENE GLYCOL 3350 17 G: 17 POWDER, FOR SOLUTION ORAL at 02:06

## 2025-06-09 RX ADMIN — DEXAMETHASONE SODIUM PHOSPHATE 10 MG: 4 INJECTION INTRA-ARTICULAR; INTRALESIONAL; INTRAMUSCULAR; INTRAVENOUS; SOFT TISSUE at 10:06

## 2025-06-09 RX ADMIN — POTASSIUM & SODIUM PHOSPHATES POWDER PACK 280-160-250 MG 1 PACKET: 280-160-250 PACK at 09:06

## 2025-06-09 RX ADMIN — LEVALBUTEROL HYDROCHLORIDE 1.25 MG: 0.63 SOLUTION RESPIRATORY (INHALATION) at 03:06

## 2025-06-09 RX ADMIN — PANTOPRAZOLE SODIUM 40 MG: 40 TABLET, DELAYED RELEASE ORAL at 02:06

## 2025-06-09 RX ADMIN — GUAIFENESIN 600 MG: 600 TABLET, EXTENDED RELEASE ORAL at 09:06

## 2025-06-09 RX ADMIN — PIPERACILLIN SODIUM AND TAZOBACTAM SODIUM 4.5 G: 4; .5 INJECTION, POWDER, FOR SOLUTION INTRAVENOUS at 06:06

## 2025-06-09 RX ADMIN — HYDROXYZINE HYDROCHLORIDE 25 MG: 25 TABLET, FILM COATED ORAL at 07:06

## 2025-06-09 RX ADMIN — FLUTICASONE PROPIONATE 100 MCG: 50 SPRAY, METERED NASAL at 05:06

## 2025-06-09 RX ADMIN — IPRATROPIUM BROMIDE AND ALBUTEROL SULFATE 3 ML: 2.5; .5 SOLUTION RESPIRATORY (INHALATION) at 01:06

## 2025-06-09 RX ADMIN — LEVETIRACETAM 500 MG: 500 TABLET, FILM COATED ORAL at 09:06

## 2025-06-09 RX ADMIN — PREDNISONE 50 MG: 50 TABLET ORAL at 02:06

## 2025-06-09 NOTE — SUBJECTIVE & OBJECTIVE
Past Medical History:   Diagnosis Date    Benign neoplasm of colon 10/01/2013    Bronchitis chronic     CAD (coronary artery disease) 10/31/2013    Cataract 2008    COPD (chronic obstructive pulmonary disease)     Emphysema of lung     Encounter for preventive health examination 03/21/2018    GERD (gastroesophageal reflux disease)     Glaucoma 2010    Hearing loss 2015    Heart attack 2013    Hx of colonic polyps     Hypertension     NAFLD (nonalcoholic fatty liver disease) 03/27/2017    SHOLA on CPAP     RLS (restless legs syndrome)     Screen for colon cancer 08/30/2013       Past Surgical History:   Procedure Laterality Date    bilateral foot surgery  1993    CARDIAC CATHETERIZATION  2013    x1    CATARACT EXTRACTION, BILATERAL      COLON SURGERY  2013    Ace Mott MD    COLONOSCOPY N/A 03/21/2018    Procedure: COLONOSCOPY;  Surgeon: Terry Mott MD;  Location: [facility] ([facility]  Service: Endoscopy;  Laterality: N/A;    COLONOSCOPY N/A 04/12/2019    Procedure: COLONOSCOPY;  Surgeon: John Mantilla MD;  Location: [facility]  Service: Endoscopy;  Laterality: N/A;    COLONOSCOPY N/A 05/31/2022    Procedure: COLONOSCOPY;  Surgeon: MEDINA Farris MD;  Location: [facility] ([facility]);  Service: Endoscopy;  Laterality: N/A;  fully vacc-inst portal-tb    ENDOBRONCHIAL ULTRASOUND Bilateral 04/30/2024    Procedure: ENDOBRONCHIAL ULTRASOUND (EBUS);  Surgeon: Naveed Aguero MD;  Location: [facility] OR;  Service: Pulmonary;  Laterality: Bilateral;    EYE SURGERY  2011    scrotal abscess removal      TYMPANOSTOMY TUBE PLACEMENT  2017       Review of patient's allergies indicates:   Allergen Reactions    Brilinta [ticagrelor] Itching    Lisinopril      Other reaction(s): cough    Metoprolol succinate Rash       Current Facility-Administered Medications on File Prior to Encounter   Medication    [ - Suspended Admission] acetaminophen tablet 650 mg    [ - Suspended Admission] albuterol nebulizer solution 0.6667  mg    [ - Suspended Admission] amitriptyline tablet 25 mg    [ - Suspended Admission] ammonium lactate 12 % lotion    [ - Suspended Admission] aspirin EC tablet 81 mg    [ - Suspended Admission] atorvastatin tablet 80 mg    [ - Suspended Admission] calcium carbonate 200 mg calcium (500 mg) chewable tablet 500 mg    dextrose 50% injection 12.5 g    dextrose 50% injection 25 g    [ - Suspended Admission] diltiaZEM tablet 120 mg    [ - Suspended Admission] enoxaparin injection 40 mg    [ - Suspended Admission] fluticasone propionate 50 mcg/actuation nasal spray 100 mcg    [ - Suspended Admission] furosemide tablet 40 mg    [ - Suspended Admission] guaiFENesin 12 hr tablet 1,200 mg    [ - Suspended Admission] hydrOXYzine HCL tablet 25 mg    insulin regular injection 0-8 Units    [ - Suspended Admission] levalbuterol nebulizer solution 1.2495 mg    [ - Suspended Admission] levETIRAcetam tablet 500 mg    [ - Suspended Admission] melatonin tablet 6 mg    [ - Suspended Admission] methocarbamoL tablet 500 mg    [ - Suspended Admission] mirtazapine tablet 15 mg    [ - Suspended Admission] mupirocin 2 % ointment    [ - Suspended Admission] naloxone 0.4 mg/mL injection 0.02 mg    [ - Suspended Admission] nitroGLYCERIN SL tablet 0.4 mg    [ - Suspended Admission] ondansetron disintegrating tablet 4 mg    [ - Suspended Admission] oxyCODONE-acetaminophen 5-325 mg per tablet 1 tablet    [ - Suspended Admission] pantoprazole EC tablet 40 mg    [ - Suspended Admission] polyethylene glycol packet 17 g    [ - Suspended Admission] roflumilast (DALIRESP) tablet 500 mcg    [ - Suspended Admission] sodium chloride 0.9% flush 10 mL    [ - Suspended Admission] sodium chloride 3% nebulizer solution 4 mL    [ - Suspended Admission] tiotropium bromide 2.5 mcg/actuation inhaler 2 puff     Current Outpatient Medications on File Prior to Encounter   Medication Sig    acetaminophen (TYLENOL) 500 MG tablet Take 500 mg by mouth 2 (two) times daily as  needed for Pain.    albuterol (ACCUNEB) 0.63 mg/3 mL Nebu Inhale 3 mLs (0.63 mg total) by nebulization every 6 (six) hours as needed (if symptoms failed to improve with inhaler). Rescue    albuterol (PROVENTIL/VENTOLIN HFA) 90 mcg/actuation inhaler Inhale 2 puffs into the lungs every 4 (four) hours as needed for Wheezing.    amitriptyline (ELAVIL) 25 MG tablet Take 1 tablet (25 mg total) by mouth once daily. (Patient taking differently: Take 25 mg by mouth every evening.)    aspirin (ECOTRIN) 81 MG EC tablet Take 81 mg by mouth every morning.    atorvastatin (LIPITOR) 80 MG tablet Take 1 tablet (80 mg total) by mouth in the morning.    BETA-CAROTENE,A, W-C & E/MIN (OCUVITE ORAL) Take 1 capsule by mouth once daily.     budesonide-glycopyr-formoterol (BREZTRI AEROSPHERE) 160-9-4.8 mcg/actuation HFAA Inhale 2 puffs into the lungs 2 (two) times a day.    dexAMETHasone (DECADRON) 4 MG Tab Take 1 tablet (4 mg total) by mouth 2 (two) times daily. (Patient not taking: Reported on 5/16/2025)    echinacea 400 mg Cap Take 1 capsule by mouth once daily.     fluticasone propionate (FLONASE) 50 mcg/actuation nasal spray Spray 2 sprays (100 mcg total) in Each Nostril once daily.    furosemide (LASIX) 20 MG tablet Take 1 tablet (20 mg total) by mouth once daily.    ibuprofen (ADVIL,MOTRIN) 200 MG tablet Take 200 mg by mouth every 8 (eight) hours as needed for Pain.    latanoprost 0.005 % ophthalmic solution Instill 1 drop into both eyes every night at bedtime    levETIRAcetam (KEPPRA) 500 MG Tab Take 1 tablet (500 mg total) by mouth 2 (two) times daily.    losartan (COZAAR) 100 MG tablet Take 1 tablet (100 mg total) by mouth once daily.    nitroGLYCERIN (NITROSTAT) 0.4 MG SL tablet Place 1 tablet (0.4 mg total) under the tongue every 5 (five) minutes as needed.    omeprazole (PRILOSEC) 20 MG capsule Take 20 mg by mouth once daily.    polyethylene glycol (GLYCOLAX) 17 gram/dose powder Take 17 grams by mouth every day for  constipation prevention    roflumilast (DALIRESP) 500 mcg Tab Take 500 mcg by mouth every morning.    sennosides (SENNA ORAL) Take 1 tablet by mouth daily as needed (Constipation).    traZODone (DESYREL) 50 MG tablet Take 1 tablet (50 mg total) by mouth every evening.     Family History       Problem Relation (Age of Onset)    Diabetes Maternal Grandmother    Heart attack Mother    Heart disease Mother    Hypertension Mother    No Known Problems Sister, Daughter          Tobacco Use    Smoking status: Former     Current packs/day: 0.00     Average packs/day: 1 pack/day for 40.0 years (40.0 ttl pk-yrs)     Types: Cigarettes     Start date: 8/15/1972     Quit date: 8/15/2012     Years since quittin.8     Passive exposure: Never    Smokeless tobacco: Never   Substance and Sexual Activity    Alcohol use: Yes     Alcohol/week: 3.0 standard drinks of alcohol     Types: 3 Cans of beer per week     Comment: maybe 2-3  beers weekly    Drug use: No    Sexual activity: Yes     Partners: Female     Review of Systems   Constitutional:  Negative for activity change, chills and fever.   HENT:  Negative for trouble swallowing.    Eyes:  Negative for photophobia and visual disturbance.   Respiratory:  Positive for cough, shortness of breath and wheezing. Negative for chest tightness.    Cardiovascular:  Positive for chest pain (resolved). Negative for palpitations and leg swelling.   Gastrointestinal:  Negative for abdominal pain, constipation, diarrhea, nausea and vomiting.   Genitourinary:  Negative for dysuria, frequency, hematuria and urgency.   Musculoskeletal:  Negative for arthralgias, back pain and gait problem.   Skin:  Negative for color change and rash.   Neurological:  Negative for dizziness, syncope, weakness, light-headedness, numbness and headaches.   Psychiatric/Behavioral:  Negative for agitation and confusion. The patient is not nervous/anxious.      Objective:     Vital Signs (Most Recent):  Temp: 98.1 °F  (36.7 °C) (06/09/25 1300)  Pulse: 93 (06/09/25 1400)  Resp: 16 (06/09/25 1400)  BP: (!) 111/56 (06/09/25 1400)  SpO2: 100 % (06/09/25 1400) Vital Signs (24h Range):  Temp:  [97.3 °F (36.3 °C)-98.1 °F (36.7 °C)] 98.1 °F (36.7 °C)  Pulse:  [] 93  Resp:  [15-41] 16  SpO2:  [85 %-100 %] 100 %  BP: (107-156)/(56-85) 111/56     Weight: 86.2 kg (190 lb)  Body mass index is 28.06 kg/m².     Physical Exam  Vitals and nursing note reviewed.   Constitutional:       General: He is not in acute distress.     Appearance: He is well-developed.   HENT:      Head: Normocephalic and atraumatic.      Mouth/Throat:      Pharynx: No oropharyngeal exudate.   Eyes:      Extraocular Movements: Extraocular movements intact.      Conjunctiva/sclera: Conjunctivae normal.   Cardiovascular:      Rate and Rhythm: Normal rate and regular rhythm.      Heart sounds: Normal heart sounds.   Pulmonary:      Effort: Pulmonary effort is normal. No respiratory distress.      Breath sounds: Wheezing and rhonchi present.      Comments: NC in place  Abdominal:      General: Bowel sounds are normal. There is no distension.      Palpations: Abdomen is soft.      Tenderness: There is no abdominal tenderness.   Musculoskeletal:         General: No tenderness. Normal range of motion.      Cervical back: Normal range of motion and neck supple.   Lymphadenopathy:      Cervical: No cervical adenopathy.   Skin:     General: Skin is warm and dry.      Capillary Refill: Capillary refill takes less than 2 seconds.      Findings: No rash.   Neurological:      Mental Status: He is alert and oriented to person, place, and time.      Cranial Nerves: No cranial nerve deficit.      Sensory: No sensory deficit.      Coordination: Coordination normal.   Psychiatric:         Behavior: Behavior normal.         Thought Content: Thought content normal.         Judgment: Judgment normal.                Significant Labs: All pertinent labs within the past 24 hours have been  reviewed.    ABGs:   Recent Labs   Lab 06/09/25  0720   PH 7.499*   PCO2 56.3*   HCO3 43.8*   POCSATURATED 96   BE 21*   PO2 79*     Bilirubin:   Recent Labs   Lab 06/03/25  0213 06/04/25  0344 06/06/25  0526 06/09/25  0553 06/09/25  0905   BILITOT 0.6 0.5 0.9 0.8 0.7     BMP:   Recent Labs   Lab 06/09/25  0553 06/09/25  0905   GLU 98 109   * 135*   K 3.6 3.5   CL 87* 88*   CO2 32* 34*   BUN 25* 26*   CREATININE 0.9 0.9   CALCIUM 9.0 9.0   MG 1.9  --      CBC:   Recent Labs   Lab 06/09/25  0553 06/09/25  0905   WBC 14.42* 15.55*   HGB 11.9* 11.9*   HCT 36.9* 36.8*   * 129*     CMP:   Recent Labs   Lab 06/09/25  0553 06/09/25  0905   * 135*   K 3.6 3.5   CL 87* 88*   CO2 32* 34*   GLU 98 109   BUN 25* 26*   CREATININE 0.9 0.9   CALCIUM 9.0 9.0   PROT 6.2 6.2   ALBUMIN 2.2* 2.3*   BILITOT 0.8 0.7   ALKPHOS 129 124   AST 37 35   ALT 63* 62*   ANIONGAP 15 13     Cardiac Markers:   Recent Labs   Lab 06/09/25  0905   BNP 62     Lactic Acid:   Recent Labs   Lab 06/09/25  0907   LACTATE 0.9     Magnesium:   Recent Labs   Lab 06/09/25  0553   MG 1.9       POCT Glucose:   Recent Labs   Lab 06/08/25  1825   POCTGLUCOSE 145*       Troponin:   Recent Labs   Lab 06/08/25  1937 06/09/25  0905   TROPONINI 10.800*  --    TROPONINIHS  --  12     TSH:   Recent Labs   Lab 06/01/25  1754   TSH 0.261*       Significant Imaging: I have reviewed all pertinent imaging results/findings within the past 24 hours.  Imaging Results              CTA Chest Non-Coronary (PE Studies) (Final result)  Result time 06/09/25 11:55:32      Final result by Anna Brown MD (06/09/25 11:55:32)                   Impression:      No evidence of acute pulmonary embolism.    New mild ill-defined airspace opacities in the dependent portions of the right upper and middle lobes when compared to 05/22/2025, nonspecific.  This could be due to atelectasis, aspiration and/or pneumonia.    5 mm left upper lobe nodule, unchanged from 05/22/2025  but new when compared to 03/05/2025.  A follow-up chest CT in 1 year could be considered if the patient is at increased risk of developing lung cancer, such as from a smoking history.    Additional findings as above.    Electronically signed by resident: Madeline Iniguez  Date:    06/09/2025  Time:    10:55    Electronically signed by: Anna Brown  Date:    06/09/2025  Time:    11:55               Narrative:    EXAMINATION:  CTA CHEST NON CORONARY (PE STUDIES)    CLINICAL HISTORY:  Pulmonary embolism (PE) suspected, high prob;    TECHNIQUE:  During intravenous bolus injection of 100 ml of Omnipaque 350 contrast medium and using low dose technique, the chest was surveyed from above the pulmonary apices through the posterior costophrenic angles data was reconstructed for multiplanar images in axial, sagittal and coronal planes and for maximal intensity projection images in the the axial, sagittal and coronal planes.    COMPARISON:  Multiple priors, most recent CTA 05/22/2025    FINDINGS:  Exam quality: Satisfactory.    Pulmonary embolism: No acute or chronic pulmonary embolism.    Central pulmonary arteries: Normal caliber.    Aorta: Normal caliber.  Mild atherosclerosis.  Aberrant right subclavian artery.    Heart: No right heart strain. Normal size.    Coronary arteries: Multi-vessel calcific atherosclerosis of the coronary arteries.    Pericardium: Normal. No effusion, thickening, or calcification.    Support tubes and lines: None.    Base of neck/thyroid: Normal.    Lymph nodes: No supraclavicular, axillary, internal mammary, mediastinal, or hilar adenopathy.    Esophagus: Normal.    Pleura: No effusion, thickening, or calcification.    Upper abdomen: Accessory splenule.  Fatty atrophy of the pancreas.    Body wall: Unremarkable    Airways: Normal.    Lungs: Centrilobular and paraseptal emphysema.  Treatment changes of the left hilar region with associated volume loss and architectural distortion, stable when  compared to prior imaging.  Right bandlike scarring versus atelectasis.  Left lower lobe calcified granuloma.  Ill-defined airspace opacities present in the dependent portions of the right upper and middle lobes are new when compared to 05/22/2025.  A 4 mm nodule in the left upper lobe (series 3, image 257) is unchanged from 05/22/2025 but new when compared to 03/05/2025.    Bones: Degenerative changes of the spine.  Height loss of the superioror endplate of L1, unchanged from 02/20/2025.  Unchanged sclerotic focus in the posterior elements of T9.                                       X-Ray Chest AP Portable (Final result)  Result time 06/09/25 09:29:54      Final result by Prince Ovalle MD (06/09/25 09:29:54)                   Impression:      See above      Electronically signed by: Prince Ovalle MD  Date:    06/09/2025  Time:    09:29               Narrative:    EXAMINATION:  XR CHEST AP PORTABLE    CLINICAL HISTORY:  sob;    TECHNIQUE:  Single frontal view of the chest was performed.    COMPARISON:  06/01/25    FINDINGS:  Cardiac size is normal.  Lungs are clear with no significant infiltrate or vascular congestion.  No pleural fluid is seen.

## 2025-06-09 NOTE — ED PROVIDER NOTES
Chief complaint:  Shortness of Breath (Pt arrives via EMS from LTAC facility c/o SOB and elevated trop)      HPI:  Jordin Allen Jr. is a 79 y.o. male presenting with history of COPD and lung cancer with persistent oxygen use on 4 L presents with acute onset of increased oxygen need and chest pain that started last night.  He required high-flow oxygen.  He had blood work drawn which showed an elevated troponin.  Currently he has no chest pain in his breathing has improved somewhat.  No fevers or chills.  He does have a cough.    ROS: As per HPI and below:  Constitutional:  No fevers, no chills  Cardiac: chest pain  Respiratory: shortness of breath  Abdominal: no abdominal pain, no nausea, no vomiting  Neuro: no focal numbness, no focal weakness        Review of patient's allergies indicates:   Allergen Reactions    Brilinta [ticagrelor] Itching    Lisinopril      Other reaction(s): cough    Metoprolol succinate Rash       Medications Ordered Prior to Encounter[1]    PMH:  As per HPI and below:  Past Medical History:   Diagnosis Date    Benign neoplasm of colon 10/01/2013    Bronchitis chronic     CAD (coronary artery disease) 10/31/2013    Cataract 2008    COPD (chronic obstructive pulmonary disease)     Emphysema of lung     Encounter for preventive health examination 03/21/2018    GERD (gastroesophageal reflux disease)     Glaucoma 2010    Hearing loss 2015    Heart attack 2013    Hx of colonic polyps     Hypertension     NAFLD (nonalcoholic fatty liver disease) 03/27/2017    SHOLA on CPAP     RLS (restless legs syndrome)     Screen for colon cancer 08/30/2013     Past Surgical History:   Procedure Laterality Date    bilateral foot surgery  1993    CARDIAC CATHETERIZATION  2013    x1    CATARACT EXTRACTION, BILATERAL      COLON SURGERY  2013    Ace Mott MD    COLONOSCOPY N/A 03/21/2018    Procedure: COLONOSCOPY;  Surgeon: Terry Mott MD;  Location: [facility] ([address] [place] [address]);  Service: Endoscopy;  Laterality:  N/A;    COLONOSCOPY N/A 2019    Procedure: COLONOSCOPY;  Surgeon: John Mantilla MD;  Location: [facility] ENDO (4TH FLR);  Service: Endoscopy;  Laterality: N/A;    COLONOSCOPY N/A 2022    Procedure: COLONOSCOPY;  Surgeon: MEDINA Farris MD;  Location: [facility] ENDO (4TH FLR);  Service: Endoscopy;  Laterality: N/A;  fully vacc-inst portal-tb    ENDOBRONCHIAL ULTRASOUND Bilateral 2024    Procedure: ENDOBRONCHIAL ULTRASOUND (EBUS);  Surgeon: Naveed Aguero MD;  Location: [facility] OR;  Service: Pulmonary;  Laterality: Bilateral;    EYE SURGERY      scrotal abscess removal      TYMPANOSTOMY TUBE PLACEMENT         Social History     Socioeconomic History    Marital status:    Tobacco Use    Smoking status: Former     Current packs/day: 0.00     Average packs/day: 1 pack/day for 40.0 years (40.0 ttl pk-yrs)     Types: Cigarettes     Start date: 8/15/1972     Quit date: 8/15/2012     Years since quittin.8     Passive exposure: Never    Smokeless tobacco: Never   Substance and Sexual Activity    Alcohol use: Yes     Alcohol/week: 3.0 standard drinks of alcohol     Types: 3 Cans of beer per week     Comment: maybe 2-3  beers weekly    Drug use: No    Sexual activity: Yes     Partners: Female   Social History Narrative    Retired .  .       Social Drivers of Health     Financial Resource Strain: Low Risk  (2025)    Overall Financial Resource Strain (CARDIA)     Difficulty of Paying Living Expenses: Not very hard   Recent Concern: Financial Resource Strain - High Risk (2025)    Overall Financial Resource Strain (CARDIA)     Difficulty of Paying Living Expenses: Hard   Food Insecurity: No Food Insecurity (2025)    Hunger Vital Sign     Worried About Running Out of Food in the Last Year: Never true     Ran Out of Food in the Last Year: Never true   Transportation Needs: No Transportation Needs (2025)    PRAPARE - Transportation     Lack of Transportation  (Medical): No     Lack of Transportation (Non-Medical): No   Physical Activity: Patient Unable To Answer (6/6/2025)    Exercise Vital Sign     Days of Exercise per Week: Patient unable to answer     Minutes of Exercise per Session: Patient unable to answer   Stress: Stress Concern Present (6/6/2025)    [employer] - Occupational Stress Questionnaire     Feeling of Stress : To some extent   Housing Stability: Low Risk  (6/6/2025)    Housing Stability Vital Sign     Unable to Pay for Housing in the Last Year: No     Homeless in the Last Year: No       Family History   Problem Relation Name Age of Onset    Heart disease Mother jc deutsch     Heart attack Mother jc deutsch     Hypertension Mother jc deutsch     No Known Problems Sister      No Known Problems Daughter 2     Diabetes Maternal Grandmother imtiaz jennings     Asthma Neg Hx      Emphysema Neg Hx      Prostate cancer Neg Hx      Cirrhosis Neg Hx         Physical Exam:    Vitals:    06/09/25 1200   BP: (!) 124/59   Pulse: 88   Resp:    Temp:      Constitutional: Well-nourished, well-developed, in mild acute distress, not cachectic  Eyes: PERRLA, EOMI, normal conjunctiva, normal sclera  ENT: Moist Mucous membranes  Respiratory:  Bilateral crackles to the bases, scattered wheezes, no rhonchi, moderate work of breathing  Cardiovascular: Regular rate and rhythm, no murmurs, no rubs, no gallops  Abdominal: Soft, nontender, nondistended, no guarding, no rebound  Musculoskeletal: Normal range of motion, no obvious deformity, normal capillary refill, head atraumatic, neck supple, no meningismus  Skin: no rash, no ecchymosis, no errythema, no discharge  Neurologic:  No new neurological deficits  Psychological: Alert, oriented x3, normal affect, normal mood    Orders Placed This Encounter   Procedures    Influenza A & B by Molecular    Blood culture #1 **CANNOT BE ORDERED STAT**    Blood culture #2 **CANNOT BE ORDERED STAT**    X-Ray  Chest AP Portable    CTA Chest Non-Coronary (PE Studies)    CBC auto differential    Comprehensive metabolic panel    Brain natriuretic peptide    Troponin I High Sensitivity    CBC with Differential    Lactic acid, plasma    COVID-19 Rapid Screening    ED Preference List Used to Initiate Sepsis Orders    Inhalation Treatment Once    EKG 12-lead    Insert Saline lock IV    Admit to Inpatient       Medications   piperacillin-tazobactam (ZOSYN) 4.5 g in D5W 100 mL IVPB (MB+) (has no administration in time range)   vancomycin 1750 mg in 0.9% sodium chloride 500 mL IVPB (has no administration in time range)   dexAMETHasone injection 10 mg (10 mg Intravenous Given 6/9/25 1017)   albuterol-ipratropium 2.5 mg-0.5 mg/3 mL nebulizer solution 3 mL (3 mLs Nebulization Given 6/9/25 1033)   iohexoL (OMNIPAQUE 350) injection 100 mL (100 mLs Intravenous Given 6/9/25 1031)         Labs Reviewed   COMPREHENSIVE METABOLIC PANEL - Abnormal       Result Value    Sodium 135 (*)     Potassium 3.5      Chloride 88 (*)     CO2 34 (*)     Glucose 109      BUN 26 (*)     Creatinine 0.9      Calcium 9.0      Protein Total 6.2      Albumin 2.3 (*)     Bilirubin Total 0.7            AST 35      ALT 62 (*)     Anion Gap 13      eGFR >60     CBC WITH DIFFERENTIAL - Abnormal    WBC 15.55 (*)     RBC 4.24 (*)     HGB 11.9 (*)     HCT 36.8 (*)     MCV 87      MCH 28.1      MCHC 32.3      RDW 16.0 (*)     Platelet Count 129 (*)     MPV 10.9      Nucleated RBC 0      Neut % 83.5 (*)     Lymph % 2.6 (*)     Mono % 11.3      Eos % 0.1      Basophil % 0.3      Imm Grans % 2.2 (*)     Neut # 12.99 (*)     Lymph # 0.41 (*)     Mono # 1.75 (*)     Eos # 0.02      Baso # 0.04      Imm Grans # 0.34 (*)    INFLUENZA A & B BY MOLECULAR - Normal    INFLUENZA A MOLECULAR Negative      INFLUENZA B MOLECULAR  Negative     B-TYPE NATRIURETIC PEPTIDE - Normal    BNP 62     TROPONIN I HIGH SENSITIVITY - Normal    Troponin High Sensitive 12     LACTIC ACID,  PLASMA - Normal    Lactic Acid Level 0.9      Narrative:     Falsely low lactic acid results can be found in samples containing >=13.0 mg/dL total bilirubin and/or >=3.5 mg/dL direct bilirubin.    SARS-COV-2 RNA AMPLIFICATION, QUAL - Normal    SARS COV-2 Molecular Negative     CULTURE, BLOOD   CULTURE, BLOOD   CBC W/ AUTO DIFFERENTIAL    Narrative:     The following orders were created for panel order CBC auto differential.                  Procedure                               Abnormality         Status                                     ---------                               -----------         ------                                     CBC with Differential[[id number]]       Abnormal            Final result                                                 Please view results for these tests on the individual orders.       CTA Chest Non-Coronary (PE Studies)   Final Result      No evidence of acute pulmonary embolism.      New mild ill-defined airspace opacities in the dependent portions of the right upper and middle lobes when compared to 05/22/2025, nonspecific.  This could be due to atelectasis, aspiration and/or pneumonia.      5 mm left upper lobe nodule, unchanged from 05/22/2025 but new when compared to 03/05/2025.  A follow-up chest CT in 1 year could be considered if the patient is at increased risk of developing lung cancer, such as from a smoking history.      Additional findings as above.      Electronically signed by resident: Madeline Iniguez   Date:    06/09/2025   Time:    10:55      Electronically signed by: Anna Brown   Date:    06/09/2025   Time:    11:55      X-Ray Chest AP Portable   Final Result      See above         Electronically signed by: Prince Ovalle MD   Date:    06/09/2025   Time:    09:29          Medical Decision Making  Differential diagnosis includes COVID, flu, life-threatening ACS, CHF, pneumonia, COPD, STEMI, NSTEMI    Patient presents with increased shortness of  breath that started acutely last night with elevated troponins sent in from LTAC.  Will perform cardiac and infectious workup and ultimately admit for further management of chest pain and shortness of breath.    Patient's initial cardiac workup is unremarkable.  However he continues to have increased oxygen requirement so a PE study was performed.  PE study does not show PE but does show a pneumonia.  Blood cultures and antibiotics started.  Will admit to internal medicine secondary to his increased oxygen requirement and new pneumonia.    Amount and/or Complexity of Data Reviewed  Labs: ordered.  Radiology: ordered and independent interpretation performed.     Details: Chest x-ray: No acute process  ECG/medicine tests: ordered and independent interpretation performed.     Details: EKG: AFib at 95 beats per minute, normal axis, normal intervals, no ST elevation or depression  Discussion of management or test interpretation with external provider(s): Internal Medicine, will admit    Risk  Prescription drug management.  Decision regarding hospitalization.      Procedures          ASSESSMENT  1. Pneumonia due to infectious organism, unspecified laterality, unspecified part of lung    2. SOB (shortness of breath)    3. Respiratory distress          Disposition:  Admit to internal medicine    New Prescriptions    No medications on file     Modified Medications    No medications on file     Discontinued Medications    No medications on file            [1]   Current Facility-Administered Medications on File Prior to Encounter   Medication Dose Route Frequency Provider Last Rate Last Admin    [ - Suspended Admission] acetaminophen tablet 650 mg  650 mg Oral Q8H PRN Yanet Padilla MD   650 mg at 06/08/25 0913    [ - Suspended Admission] albuterol nebulizer solution 0.6667 mg  0.6667 mg Nebulization Q6H PRN Yanet Padilla MD        [ - Suspended Admission] amitriptyline tablet 25 mg  25 mg Oral QHS Yanet Padilla  MD SHADE   25 mg at 06/08/25 2039    [ - Suspended Admission] ammonium lactate 12 % lotion   Topical (Top) Daily Sheree Tao FNP-C   Given at 06/08/25 0917    [ - Suspended Admission] aspirin EC tablet 81 mg  81 mg Oral QAM Yanet Padilla MD   81 mg at 06/08/25 0913    [ - Suspended Admission] atorvastatin tablet 80 mg  80 mg Oral Daily Yanet Padilla MD   80 mg at 06/08/25 0912    [ - Suspended Admission] calcium carbonate 200 mg calcium (500 mg) chewable tablet 500 mg  500 mg Oral TID PRN Ernestine Palomino NP   500 mg at 06/05/25 1413    dextrose 50% injection 12.5 g  12.5 g Intravenous PRN Lidia Deleon MD        dextrose 50% injection 25 g  25 g Intravenous PRN Lidia Deleon MD        [ - Suspended Admission] diltiaZEM tablet 120 mg  120 mg Oral Q8H Yanet Padilla MD   120 mg at 06/09/25 0606    [ - Suspended Admission] enoxaparin injection 40 mg  40 mg Subcutaneous Daily Yanet Padilla MD   40 mg at 06/08/25 1743    [ - Suspended Admission] fluticasone propionate 50 mcg/actuation nasal spray 100 mcg  2 spray Each Nostril Daily Ernestine Palomino NP   100 mcg at 06/08/25 0916    [ - Suspended Admission] furosemide tablet 40 mg  40 mg Oral Daily Yanet Padilla MD   40 mg at 06/08/25 0913    [ - Suspended Admission] guaiFENesin 12 hr tablet 1,200 mg  1,200 mg Oral BID Yanet Padilla MD   1,200 mg at 06/08/25 2038    [ - Suspended Admission] hydrOXYzine HCL tablet 25 mg  25 mg Oral TID PRN Yanet Padilla MD   25 mg at 06/09/25 0717    insulin regular injection 0-8 Units  0-8 Units Intravenous Continuous PRN Lidia Deleon MD        [ - Suspended Admission] levalbuterol nebulizer solution 1.2495 mg  1.2495 mg Nebulization Q4H Yanet Padilla MD   1.2495 mg at 06/09/25 0748    [ - Suspended Admission] levETIRAcetam tablet 500 mg  500 mg Oral BID Yanet Padilla MD   500 mg at 06/08/25 2039    [ - Suspended Admission] melatonin tablet 6 mg  6 mg Oral Nightly  PRN Yanet Padilla MD   6 mg at 06/07/25 2150    [ - Suspended Admission] methocarbamoL tablet 500 mg  500 mg Oral TID PRN Ernestine Palomino NP   500 mg at 06/08/25 1242    [ - Suspended Admission] mirtazapine tablet 15 mg  15 mg Oral QHS Ernestine Palomino NP   15 mg at 06/08/25 2038    [ - Suspended Admission] mupirocin 2 % ointment   Nasal BID Bernardino Guthrie MD   Given at 06/08/25 2039    [ - Suspended Admission] naloxone 0.4 mg/mL injection 0.02 mg  0.02 mg Intravenous PRN Yanet Padilla MD        [ - Suspended Admission] nitroGLYCERIN SL tablet 0.4 mg  0.4 mg Sublingual Q5 Min PRN Ernestine Palomino NP   0.4 mg at 06/08/25 1858    [ - Suspended Admission] ondansetron disintegrating tablet 4 mg  4 mg Oral Q6H PRN Ernestine Plaomino NP   4 mg at 06/07/25 0914    [ - Suspended Admission] oxyCODONE-acetaminophen 5-325 mg per tablet 1 tablet  1 tablet Oral Q4H PRN Ernestine Palomino NP   1 tablet at 06/07/25 1345    [ - Suspended Admission] pantoprazole EC tablet 40 mg  40 mg Oral Daily Yanet Padilla MD   40 mg at 06/08/25 0912    [ - Suspended Admission] polyethylene glycol packet 17 g  17 g Oral BID PRN Ernestine Palomino NP        [ - Suspended Admission] roflumilast (DALIRESP) tablet 500 mcg  500 mcg Oral Daily Ernestine Palomnio NP   500 mcg at 06/08/25 0912    [ - Suspended Admission] sodium chloride 0.9% flush 10 mL  10 mL Intravenous Q12H PRN Yanet Padilla MD        [ - Suspended Admission] sodium chloride 3% nebulizer solution 4 mL  4 mL Nebulization BID Yanet Padilla MD   4 mL at 06/08/25 2000    [ - Suspended Admission] tiotropium bromide 2.5 mcg/actuation inhaler 2 puff  2 puff Inhalation Daily Yanet Padilla MD   2 puff at 06/08/25 0803     Current Outpatient Medications on File Prior to Encounter   Medication Sig Dispense Refill    acetaminophen (TYLENOL) 500 MG tablet Take 500 mg by mouth 2 (two) times daily as needed for Pain.      albuterol (ACCUNEB) 0.63 mg/3 mL  Nebu Inhale 3 mLs (0.63 mg total) by nebulization every 6 (six) hours as needed (if symptoms failed to improve with inhaler). Rescue 375 mL 11    albuterol (PROVENTIL/VENTOLIN HFA) 90 mcg/actuation inhaler Inhale 2 puffs into the lungs every 4 (four) hours as needed for Wheezing. 8.5 g 11    amitriptyline (ELAVIL) 25 MG tablet Take 1 tablet (25 mg total) by mouth once daily. (Patient taking differently: Take 25 mg by mouth every evening.) 90 tablet 3    aspirin (ECOTRIN) 81 MG EC tablet Take 81 mg by mouth every morning.      atorvastatin (LIPITOR) 80 MG tablet Take 1 tablet (80 mg total) by mouth in the morning. 90 tablet 3    BETA-CAROTENE,A, W-C & E/MIN (OCUVITE ORAL) Take 1 capsule by mouth once daily.       budesonide-glycopyr-formoterol (BREZTRI AEROSPHERE) 160-9-4.8 mcg/actuation HFAA Inhale 2 puffs into the lungs 2 (two) times a day. 10.7 g 3    dexAMETHasone (DECADRON) 4 MG Tab Take 1 tablet (4 mg total) by mouth 2 (two) times daily. (Patient not taking: Reported on 5/16/2025) 60 tablet 0    echinacea 400 mg Cap Take 1 capsule by mouth once daily.       fluticasone propionate (FLONASE) 50 mcg/actuation nasal spray Spray 2 sprays (100 mcg total) in Each Nostril once daily. 16 g 11    furosemide (LASIX) 20 MG tablet Take 1 tablet (20 mg total) by mouth once daily. 30 tablet 11    ibuprofen (ADVIL,MOTRIN) 200 MG tablet Take 200 mg by mouth every 8 (eight) hours as needed for Pain.      latanoprost 0.005 % ophthalmic solution Instill 1 drop into both eyes every night at bedtime 7.5 mL 4    levETIRAcetam (KEPPRA) 500 MG Tab Take 1 tablet (500 mg total) by mouth 2 (two) times daily. 60 tablet 11    losartan (COZAAR) 100 MG tablet Take 1 tablet (100 mg total) by mouth once daily. 90 tablet 3    nitroGLYCERIN (NITROSTAT) 0.4 MG SL tablet Place 1 tablet (0.4 mg total) under the tongue every 5 (five) minutes as needed. 100 tablet 3    omeprazole (PRILOSEC) 20 MG capsule Take 20 mg by mouth once daily.       polyethylene glycol (GLYCOLAX) 17 gram/dose powder Take 17 grams by mouth every day for constipation prevention 238 g 0    roflumilast (DALIRESP) 500 mcg Tab Take 500 mcg by mouth every morning.      sennosides (SENNA ORAL) Take 1 tablet by mouth daily as needed (Constipation).      traZODone (DESYREL) 50 MG tablet Take 1 tablet (50 mg total) by mouth every evening. 30 tablet 5        Omar Cleveland III, MD  06/09/25 [id number]

## 2025-06-09 NOTE — CARE UPDATE
ABG noted.  Discussed with previous provider, he was managed at LTAC for weaning oxygen. Apparently he was placed on Lasix for his hypoxia.  Echo report seen, no heart failure or valvular disease.  Metabolic alkalosis with respiratory compensation noted, likely underlying history of chronic respiratory acidosis as well given his COPD.  We will hold Lasix for now and encourage oral intake.  Patient states that he is feeling better.  We will continue on vancomycin and Zosyn, nebs and steroids in addition supportive care.  Follow up with speech pathology

## 2025-06-09 NOTE — PROGRESS NOTES
[facility]  Wound Care Consult  Attending Physician: Bernardino Guthrie MD  Primary Care Physician: Sierra Landry MD  Date of Admit: 6/3/2025      Chief Complaint    Wound to left leg   History of Present Illness:     Per attending   HPI: Jordin Allen Jr. 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 ED from [facility] with acute onset need for increased oxygen associated with chest pain beginning last night. Patient has not felt this chest pain since the episode. Currently no chest pain. Does endorse SOB and cough. Reports he is not coughing up anything because he feels like it is stuck in his chest. Denies fever, chills, chest pain, palpitations,  abdominal pain, n/v/d, dysuria, headaches, or any other symptoms at this time.      In ED: AF, VSS on 6-8L HFNC. CBC with leukocytosis. Hgb 11.9. CMP notable for Na 135, mild elevation in ALT. Phos 2.4. BNP 62. HS trop 12. LA 0.9. covid/flu negative. ABG with pH 7.499, pCO2 56.3, pO2 79, HCO3 43.8. Blood cultures pending. CTA chest with no evidence of acute PE, mild ill-defined airspace opacities in the dependent portions of the right upper and middle lobes when compared to 05/22/2025, nonspecific.  This could be due to atelectasis, aspiration and/or pneumonia 5 mm left upper lobe nodule, unchanged from 05/22/2025 but new when compared to 03/05/2025.  A follow-up chest CT in 1 year could be considered if the patient is at increased risk of developing lung cancer, such as from a smoking history. S/p duoneb, dexamethasone 10mg inj, vanc and zosyn in ED. Admitted to  for further evaluation.     6/4/2025 Patient seen for wound to left leg  Patient seen lying in bed. No acute distress. Wound care done with nurse. No issues or complaints at time.  Cont POC Plan to f/u on 6/5 6/6/2025 Patient seen lying in bed. No acute distress. Wound care done with nurse. No issues or complaints at time. Cont POC Plan to f/u on 6/9    Past medical history:     Past Medical History:   Diagnosis Date    Benign neoplasm of colon 10/01/2013    Bronchitis chronic     CAD (coronary artery disease) 10/31/2013    Cataract 2008    COPD (chronic obstructive pulmonary disease)     Emphysema of lung     Encounter for preventive health examination 03/21/2018    GERD (gastroesophageal reflux disease)     Glaucoma 2010    Hearing loss 2015    Heart attack 2013    Hx of colonic polyps     Hypertension     NAFLD (nonalcoholic fatty liver disease) 03/27/2017    SHOLA on CPAP     RLS (restless legs syndrome)     Screen for colon cancer 08/30/2013     Past medical history was reviewed and was otherwise negative except as above.    Past surgical history:     Past Surgical History:   Procedure Laterality Date    bilateral foot surgery  1993    CARDIAC CATHETERIZATION  2013    x1    CATARACT EXTRACTION, BILATERAL      COLON SURGERY  2013    Ace Mott MD    COLONOSCOPY N/A 03/21/2018    Procedure: COLONOSCOPY;  Surgeon: Terry Mott MD;  Location: [address]);  Service: Endoscopy;  Laterality: N/A;    COLONOSCOPY N/A 04/12/2019    Procedure: COLONOSCOPY;  Surgeon: John Mantilla MD;  [facility]  Service: Endoscopy;  Laterality: N/A;    COLONOSCOPY N/A 05/31/2022    Procedure: COLONOSCOPY;  Surgeon: MEDINA Farris MD;  [address]  Service: Endoscopy;  Laterality: N/A;  fully vacc-inst portal-tb    ENDOBRONCHIAL ULTRASOUND Bilateral 04/30/2024    Procedure: ENDOBRONCHIAL ULTRASOUND (EBUS);  Surgeon: Naveed Aguero MD;  Location: [facility] OR;  Service: Pulmonary;  Laterality: Bilateral;    EYE SURGERY  2011    scrotal abscess removal      TYMPANOSTOMY TUBE PLACEMENT  2017     Past surgical history was reviewed and was noncontributory except  as above.    Allergies:     Review of patient's allergies indicates:   Allergen Reactions    Brilinta [ticagrelor] Itching    Lisinopril      Other reaction(s): cough    Metoprolol succinate Rash     Allergies were reviewed and were negative except as above.    Home Medications:     Prior to Admission medications    Medication Sig Start Date End Date Taking? Authorizing Provider   acetaminophen (TYLENOL) 500 MG tablet Take 500 mg by mouth 2 (two) times daily as needed for Pain.    Provider, Historical   albuterol (ACCUNEB) 0.63 mg/3 mL Nebu Inhale 3 mLs (0.63 mg total) by nebulization every 6 (six) hours as needed (if symptoms failed to improve with inhaler). Rescue 12/10/24   Bienvenido Ward MD   albuterol (PROVENTIL/VENTOLIN HFA) 90 mcg/actuation inhaler Inhale 2 puffs into the lungs every 4 (four) hours as needed for Wheezing. 12/10/24   Bienvenido Wadr MD   amitriptyline (ELAVIL) 25 MG tablet Take 1 tablet (25 mg total) by mouth once daily.  Patient taking differently: Take 25 mg by mouth every evening. 9/3/24   Sierra Landry MD   aspirin (ECOTRIN) 81 MG EC tablet Take 81 mg by mouth every morning.    Provider, Historical   atorvastatin (LIPITOR) 80 MG tablet Take 1 tablet (80 mg total) by mouth in the morning. 4/21/25   Sierra Landry MD   BETA-CAROTENE,A, W-C & E/MIN (OCUVITE ORAL) Take 1 capsule by mouth once daily.     Provider, Historical   budesonide-glycopyr-formoterol (BREZTRI AEROSPHERE) 160-9-4.8 mcg/actuation HFAA Inhale 2 puffs into the lungs 2 (two) times a day. 12/10/24   Bienvenido Ward MD   dexAMETHasone (DECADRON) 4 MG Tab Take 1 tablet (4 mg total) by mouth 2 (two) times daily.  Patient not taking: Reported on 5/16/2025 3/26/25   Bienvenido Henson MD   echinacea 400 mg Cap Take 1 capsule by mouth once daily.     Provider, Historical   fluticasone propionate (FLONASE) 50 mcg/actuation nasal spray Spray 2 sprays (100 mcg total) in Each Nostril once daily. 10/23/24    Caren Shah MD   furosemide (LASIX) 20 MG tablet Take 1 tablet (20 mg total) by mouth once daily. 4/15/25 4/15/26  Bienvenido Ward MD   ibuprofen (ADVIL,MOTRIN) 200 MG tablet Take 200 mg by mouth every 8 (eight) hours as needed for Pain. 2/21/25   Provider, Historical   latanoprost 0.005 % ophthalmic solution Instill 1 drop into both eyes every night at bedtime 1/30/25      levETIRAcetam (KEPPRA) 500 MG Tab Take 1 tablet (500 mg total) by mouth 2 (two) times daily. 3/24/25 3/24/26  Janene Nelson PA-C   losartan (COZAAR) 100 MG tablet Take 1 tablet (100 mg total) by mouth once daily. 12/19/24   Prince Ba MD   nitroGLYCERIN (NITROSTAT) 0.4 MG SL tablet Place 1 tablet (0.4 mg total) under the tongue every 5 (five) minutes as needed. 5/6/24   Prince Ba MD   omeprazole (PRILOSEC) 20 MG capsule Take 20 mg by mouth once daily.    Provider, Historical   polyethylene glycol (GLYCOLAX) 17 gram/dose powder Take 17 grams by mouth every day for constipation prevention 5/20/25      roflumilast (DALIRESP) 500 mcg Tab Take 500 mcg by mouth every morning. 2/22/25   Provider, Historical   sennosides (SENNA ORAL) Take 1 tablet by mouth daily as needed (Constipation).    Provider, Historical   traZODone (DESYREL) 50 MG tablet Take 1 tablet (50 mg total) by mouth every evening. 1/14/25   Lou Muro NP       Family History:     Family History   Problem Relation Name Age of Onset    Heart disease Mother jc deutsch     Heart attack Mother jc deutsch     Hypertension Mother jc deutsch     No Known Problems Sister      No Known Problems Daughter 2     Diabetes Maternal Grandmother imtiaz jennings     Asthma Neg Hx      Emphysema Neg Hx      Prostate cancer Neg Hx      Cirrhosis Neg Hx       Family history was reviewed and was otherwise negative except as above.    Social History:   Social History[1]  Social history was reviewed and was otherwise negative except as above    Review of Systems   A 10 point  "review of systems was conducted and was negative except as described in the HPI.  Patient denies Chest Pain.  Patient denies shortness of breath.  Patient denies fevers.  Patient denies chills.    Physical Examination:   Triage: BP: 123/77  Pulse: 101  Temp: 97.6 °F (36.4 °C)  Resp: 18  Height: 5' 9" (175.3 cm)  Weight: 86.8 kg (191 lb 5.8 oz) (witness by SALO Escalante and C.C. RT.)  BMI (Calculated): 28.2  SpO2: 97 %  Exam: /81 (BP Location: Right arm, Patient Position: Lying)   Pulse 92   Temp 97.4 °F (36.3 °C) (Oral)   Resp (!) 24   Ht 5' 9.02" (1.753 m)   Wt 86.3 kg (190 lb 4.1 oz)   SpO2 (!) 94%   BMI 28.08 kg/m²     Vitals  BP  Min: 95/69  Max: 156/85  Temp  Av.6 °F (36.4 °C)  Min: 97.3 °F (36.3 °C)  Max: 98.1 °F (36.7 °C)  Pulse  Av.3  Min: 71  Max: 128  Resp  Av.8  Min: 15  Max: 41  SpO2  Av.5 %  Min: 85 %  Max: 100 %  Height  Av' 9" (175.3 cm)  Min: 5' 9" (175.3 cm)  Max: 5' 9.02" (175.3 cm)  Weight  Av.8 kg (191 lb 5.6 oz)  Min: 86.2 kg (190 lb)  Max: 87.9 kg (193 lb 12.6 oz)    General Pt alert and oriented, not in apparent distress, good nutrition  Eyes: No conjunctival erythema; normal appearing lids  HEENT: Normocephalic atraumatic, mucous membranes moist  Cardiovascular: No edema  Respiratory: Non-labored, no accessory muscle use, no wheezing  Abdomen: Soft, non tender, non distended, no rebound  Musculoskeletal: no clubbing or cyanosis of digits  Neuro: Grossly intact, weakness upper/lower extremities  Psych: Good mood, full affect, good insight  Skin:              25 1030        Wound 25 0233 Moisture associated dermatitis Left dorsal Foot   Date First Assessed/Time First Assessed: 25 0233   Present on Original Admission: Yes  Primary Wound Type: Moisture associated dermatitis  Side: Left  Orientation: dorsal  Location: Foot  Is this injury device related?: No   Wound Image    Dressing Appearance Open to air   Drainage Amount None "   Drainage Characteristics/Odor No odor   Appearance Dry   Red (%), Wound Tissue Color 100 %   Periwound Area [place]   Care    (nursing to apply Lachydrin)        Wound 05/19/25 0231 Moisture associated dermatitis Left lateral Foot   Date First Assessed/Time First Assessed: 05/19/25 0231   Present on Original Admission: Yes  Primary Wound Type: Moisture associated dermatitis  Side: Left  Orientation: lateral  Location: Foot   Wound Image                           (took photo of all angles of foot)   Dressing Appearance Open to air   Drainage Amount None   Drainage Characteristics/Odor No odor   Appearance Dry   Tissue loss description Not applicable   Periwound Area Dry   Wound Edges Approximated   Care    (nursing to apply Lachydrin, by nursing)        Wound 05/19/25 0233 Moisture associated dermatitis Left anterior Foot   Date First Assessed/Time First Assessed: 05/19/25 0233   Primary Wound Type: Moisture associated dermatitis  Side: Left  Orientation: anterior  Location: Foot   Wound Image    Dressing Appearance Open to air   Drainage Amount None   Drainage Characteristics/Odor No odor   Appearance Dry   Tissue loss description Not applicable   Red (%), Wound Tissue Color 100 %   Periwound Area Pink   Wound Edges Undefined   Care    (nursing to apply Lachydrin)   Periwound Care Dry periwound area maintained        Wound 05/19/25 0231 Pressure Injury Right anterior Ankle   Date First Assessed/Time First Assessed: 05/19/25 0231   Present on Original Admission: Yes  Primary Wound Type: Pressure Injury  Side: Right  Orientation: anterior  Location: Ankle   Wound Image    Pressure Injury Stage 1   Dressing Appearance Moist drainage   Drainage Amount Scant   Drainage Characteristics/Odor No odor   Appearance Pink   Tissue loss description Partial thickness   Red (%), Wound Tissue Color 100 %   Periwound Area Intact   Wound Length (cm) 0.5 cm   Wound Width (cm) 0.5 cm   Wound Depth (cm) 0.1 cm   Wound Volume (cm^3) 0.013  cm^3   Wound Surface Area (cm^2) 0.2 cm^2   Care Cleansed with:   Dressing    (apply mepi border)   [REMOVED]      Wound 05/22/25 1933 Skin Tear Right distal Arm   Final Assessment Date/Final Assessment Time: 06/04/25 1030  Date First Assessed/Time First Assessed: 05/22/25 1933   Present on Original Admission: No  Primary Wound Type: Skin Tear  Side: Right  Orientation: distal  Location: Arm  Wound Outcome: Healed   Wound Image    Dressing Appearance Intact   Drainage Amount None   Appearance Dry   Red (%), Wound Tissue Color 100 %   Periwound Area Dry   Wound Length (cm) 0 cm   Wound Width (cm) 0 cm   Wound Depth (cm) 0 cm   Wound Volume (cm^3) 0 cm^3   Wound Surface Area (cm^2) 0 cm^2   Care Cleansed with:;Wound cleanser   Periwound Care Dry periwound area maintained   [REMOVED]      Wound 05/22/25 1933 Pressure Injury Nose   Final Assessment Date/Final Assessment Time: 06/04/25 1030  Date First Assessed/Time First Assessed: 05/22/25 1933   Present on Original Admission: Yes  Primary Wound Type: Pressure Injury  Location: Nose  Is this injury device related?: (c) Yes  Woun...   Wound Image    Dressing Appearance Open to air   Drainage Amount None   Drainage Characteristics/Odor No odor   Appearance Dry   Red (%), Wound Tissue Color 100 %   Periwound Area Dry   Wound Length (cm) 0 cm   Wound Width (cm) 0 cm   Wound Depth (cm) 0 cm   Wound Volume (cm^3) 0 cm^3   Wound Surface Area (cm^2) 0 cm^2   Care Cleansed with:;Wound cleanser   Dressing    (open to air, dry)        Wound 06/04/25 1030 Moisture associated dermatitis Right lateral Abdomen   Date First Assessed/Time First Assessed: 06/04/25 1030   Present on Original Admission: Yes  Primary Wound Type: Moisture associated dermatitis  Side: Right  Orientation: lateral  Location: Abdomen   Wound Image    Dressing Appearance No dressing   Drainage Amount None   Red (%), Wound Tissue Color 100 %   Periwound Area Moist   Care Wound cleanser   Dressing    (applied Triad  paste)        Wound 06/04/25 1030 Moisture associated dermatitis Left lateral Abdomen   Date First Assessed/Time First Assessed: 06/04/25 1030   Present on Original Admission: Yes  Primary Wound Type: Moisture associated dermatitis  Side: Left  Orientation: lateral  Location: Abdomen   Wound Image    Dressing Appearance Open to air   Drainage Amount None   Appearance Moist   Red (%), Wound Tissue Color 100 %   Periwound Area Moist   Care Wound cleanser   Dressing    (applied Triad paste)        Wound 06/04/25 1030 Moisture associated dermatitis Right Groin   Date First Assessed/Time First Assessed: 06/04/25 1030   Present on Original Admission: Yes  Primary Wound Type: Moisture associated dermatitis  Side: Right  Location: Groin  Is this injury device related?: No   Wound Image    Dressing Appearance Open to air   Drainage Amount None   Red (%), Wound Tissue Color 100 %   Periwound Area Moist   Care Cleansed with:;Wound cleanser   Dressing    (applied Triad paste)        Wound 06/04/25 1030 Moisture associated dermatitis Left Groin   Date First Assessed/Time First Assessed: 06/04/25 1030   Present on Original Admission: Yes  Primary Wound Type: Moisture associated dermatitis  Side: Left  Location: Groin   Wound Image    Drainage Amount None   Red (%), Wound Tissue Color 100 %   Periwound Area Moist   Care Cleansed with:;Wound cleanser   Dressing    (applied Triad paste)        Wound 06/04/25 1030 Moisture associated dermatitis Sacral spine   Date First Assessed/Time First Assessed: 06/04/25 1030   Present on Original Admission: Yes  Primary Wound Type: (c) Moisture associated dermatitis  Location: (c) Sacral spine   Wound Image    Drainage Amount None   Drainage Characteristics/Odor No odor   Appearance Moist   Periwound Area Moist   Care Wound cleanser   Dressing    (applied Triad paste)             Laboratory:  Most Recent Data:  CBC:   Lab Results   Component Value Date    WBC 15.55 (H) 06/09/2025    HGB 11.9 (L)  06/09/2025    HCT 36.8 (L) 06/09/2025     (L) 06/09/2025    MCV 87 06/09/2025    RDW 16.0 (H) 06/09/2025     BMP:   Lab Results   Component Value Date     (L) 06/09/2025    K 3.5 06/09/2025    CL 88 (L) 06/09/2025    CO2 34 (H) 06/09/2025    BUN 26 (H) 06/09/2025    CREATININE 0.9 06/09/2025     06/09/2025    CALCIUM 9.0 06/09/2025    MG 1.9 06/09/2025    PHOS 2.4 (L) 06/09/2025     LFTs:   Lab Results   Component Value Date    PROT 6.2 06/09/2025    ALBUMIN 2.3 (L) 06/09/2025    BILITOT 0.7 06/09/2025    AST 35 06/09/2025    ALKPHOS 124 06/09/2025    ALT 62 (H) 06/09/2025     Coags:   Lab Results   Component Value Date    INR 1.0 02/07/2025     FLP:   Lab Results   Component Value Date    CHOL 101 (L) 02/07/2025    HDL 35 (L) 02/07/2025    LDLCALC 53.2 (L) 02/07/2025    TRIG 64 02/07/2025    CHOLHDL 34.7 02/07/2025     DM:   Lab Results   Component Value Date    HGBA1C 5.7 (H) 03/06/2024    HGBA1C 5.6 10/12/2023    HGBA1C 5.8 05/30/2013    GLUF 103 05/19/2014    LDLCALC 53.2 (L) 02/07/2025    CREATININE 0.9 06/09/2025     Thyroid:   Lab Results   Component Value Date    TSH 0.261 (L) 06/01/2025    FREET4 0.99 06/01/2025    C8UHUJZ 9.1 03/06/2024    B6XAOLF 86 03/07/2017     Anemia:   Lab Results   Component Value Date    IRON 28 (L) 08/29/2024    TIBC 266 08/29/2024    FERRITIN 2,080 (H) 08/29/2024    [id number] 474 08/29/2024    FOLATE 4.5 08/29/2024     Cardiac:   Lab Results   Component Value Date    TROPONINI 10.800 (H) 06/08/2025    BNP 62 06/09/2025     Urinalysis:   Lab Results   Component Value Date    COLORU Yellow 06/09/2025    SPECGRAV 1.010 06/09/2025    NITRITE Negative 06/09/2025    KETONESU Trace (A) 02/07/2025    UROBILINOGEN Negative 06/09/2025    WBCUA 4 06/09/2025       Trended Lab Data:  Recent Labs   Lab 06/06/25  0526 06/09/25  0553 06/09/25 0905   WBC 12.15 14.42* 15.55*   HGB 12.1* 11.9* 11.9*   PLT 94* 131* 129*   MCV 90 87 87   RDW 16.4* 16.1* 16.0*    134* 135*  "  K 3.6 3.6 3.5   CL 97 87* 88*   CO2 32* 32* 34*   BUN 27* 25* 26*    98 109   CALCIUM 8.8 9.0 9.0   PROT 5.5* 6.2 6.2   ALBUMIN 2.3* 2.2* 2.3*   BILITOT 0.9 0.8 0.7   AST 19 37 35   ALKPHOS 84 129 124   ALT 40 63* 62*       Trended Cardiac Data:  Recent Labs   Lab 06/08/25  1937 06/09/25  0905   TROPONINI 10.800*  --    BNP  --  62       Micro Results  No components found for: "CBLOOD"  No components found for: "CURINE"  No components found for: "CRESPWSM"    Radiology:  CTA Chest Non-Coronary (PE Studies)  Result Date: 6/9/2025  EXAMINATION: CTA CHEST NON CORONARY (PE STUDIES) CLINICAL HISTORY: Pulmonary embolism (PE) suspected, high prob; TECHNIQUE: During intravenous bolus injection of 100 ml of Omnipaque 350 contrast medium and using low dose technique, the chest was surveyed from above the pulmonary apices through the posterior costophrenic angles data was reconstructed for multiplanar images in axial, sagittal and coronal planes and for maximal intensity projection images in the the axial, sagittal and coronal planes. COMPARISON: Multiple priors, most recent CTA 05/22/2025 FINDINGS: Exam quality: Satisfactory. Pulmonary embolism: No acute or chronic pulmonary embolism. Central pulmonary arteries: Normal caliber. Aorta: Normal caliber.  Mild atherosclerosis.  Aberrant right subclavian artery. Heart: No right heart strain. Normal size. Coronary arteries: Multi-vessel calcific atherosclerosis of the coronary arteries. Pericardium: Normal. No effusion, thickening, or calcification. Support tubes and lines: None. Base of neck/thyroid: Normal. Lymph nodes: No supraclavicular, axillary, internal mammary, mediastinal, or hilar adenopathy. Esophagus: Normal. Pleura: No effusion, thickening, or calcification. Upper abdomen: Accessory splenule.  Fatty atrophy of the pancreas. Body wall: Unremarkable Airways: Normal. Lungs: Centrilobular and paraseptal emphysema.  Treatment changes of the left hilar region with " associated volume loss and architectural distortion, stable when compared to prior imaging.  Right bandlike scarring versus atelectasis.  Left lower lobe calcified granuloma.  Ill-defined airspace opacities present in the dependent portions of the right upper and middle lobes are new when compared to 05/22/2025.  A 4 mm nodule in the left upper lobe (series 3, image 257) is unchanged from 05/22/2025 but new when compared to 03/05/2025. Bones: Degenerative changes of the spine.  Height loss of the superioror endplate of L1, unchanged from 02/20/2025.  Unchanged sclerotic focus in the posterior elements of T9.     No evidence of acute pulmonary embolism. New mild ill-defined airspace opacities in the dependent portions of the right upper and middle lobes when compared to 05/22/2025, nonspecific.  This could be due to atelectasis, aspiration and/or pneumonia. 5 mm left upper lobe nodule, unchanged from 05/22/2025 but new when compared to 03/05/2025.  A follow-up chest CT in 1 year could be considered if the patient is at increased risk of developing lung cancer, such as from a smoking history. Additional findings as above. Electronically signed by resident: Madeline Iniguez Date:    06/09/2025 Time:    10:55 Electronically signed by: Anna Brown Date:    06/09/2025 Time:    11:55    X-Ray Chest AP Portable  Result Date: 6/9/2025  EXAMINATION: XR CHEST AP PORTABLE CLINICAL HISTORY: sob; TECHNIQUE: Single frontal view of the chest was performed. COMPARISON: 06/01/25 FINDINGS: Cardiac size is normal.  Lungs are clear with no significant infiltrate or vascular congestion.  No pleural fluid is seen.     See above Electronically signed by: Prince Ovalle MD Date:    06/09/2025 Time:    09:29    X-Ray Chest AP Portable  Result Date: 6/1/2025  EXAMINATION: XR CHEST AP PORTABLE CLINICAL HISTORY: palpitations; TECHNIQUE: Single frontal view of the chest was performed. COMPARISON: 05/26/2025. FINDINGS: The lungs are well  expanded and clear. No focal opacities are seen. The pleural spaces are clear. The cardiac silhouette is unremarkable. The visualized osseous structures are unremarkable.     No acute abnormality. Electronically signed by: Popeye Mckeon Date:    06/01/2025 Time:    22:33    X-Ray Chest AP Portable  Result Date: 5/26/2025  EXAMINATION: XR CHEST AP PORTABLE CLINICAL HISTORY: sob; FINDINGS: Chest one view AP portable. Heart size is normal.  Lungs are clear.  The bones are noncontributory.     No acute process seen. Electronically signed by: Alexander Blankenship MD Date:    05/26/2025 Time:    09:16    X-Ray Chest AP Portable  Result Date: 5/25/2025  EXAMINATION: XR CHEST AP PORTABLE CLINICAL HISTORY: chf; TECHNIQUE: Single frontal view of the chest was performed. COMPARISON: 05/22/2025 FINDINGS: The cardiomediastinal silhouette is stable in configuration noting calcification of the aorta..  There is no pleural effusion.  The trachea is midline.  The lungs are symmetrically expanded bilaterally with coarse interstitial attenuation, similar to the previous examination.  There is increased parenchymal attenuation projected over the medial aspect of the right lower lung zone, may reflect atelectasis, developing consolidation not excluded..  There is no pneumothorax.  The osseous structures are unchanged..     As above Electronically signed by: Artur Velazquez MD Date:    05/25/2025 Time:    19:17    Echo  Result Date: 5/23/2025    Tachycardia was present during the study   Erall the study quality was technically difficult. valves not well visualized.   Left Ventricle: The left ventricle is normal in size. Normal wall thickness. Normal wall motion. There is normal systolic function with a visually estimated ejection fraction of 65 - 70%. There is normal diastolic function. Normal left ventricular filling pressure.   Right Ventricle: The right ventricle is normal in size Wall thickness is normal. Systolic function is normal.    Left Atrium: Left atrium was not well visualized.   Right Atrium: Not well visualized due to poor acoustic window.   Aortic Valve: Not well visualized due to poor acoustic window.   Mitral Valve: Not well visualized due to poor acoustic window.   Tricuspid Valve: Not well visualized due to poor acoustic window.   Pulmonic Valve: Not well visualized due to poor acoustic window.   Aorta: The aortic root is normal in size measuring 2.79 cm. The ascending aorta is normal in size measuring 2.7 cm.   IVC/SVC: Normal venous pressure at 3 mmHg.   Pericardium: There is no pericardial effusion.     US Lower Extremity Veins Bilateral  Result Date: 5/22/2025  EXAMINATION: US LOWER EXTREMITY VEINS BILATERAL CLINICAL HISTORY: dvt; TECHNIQUE: Duplex and color flow Doppler and dynamic compression was performed of the bilateral lower extremity veins was performed. COMPARISON: 05/15/2025. FINDINGS: Right thigh veins: The common femoral, femoral, popliteal, upper greater saphenous, and deep femoral veins are patent and free of thrombus. The veins are normally compressible and have normal phasic flow and augmentation response. Right calf veins: The visualized calf veins are patent. Left thigh veins: The common femoral, femoral, popliteal, upper greater saphenous, and deep femoral veins are patent and free of thrombus. The veins are normally compressible and have normal phasic flow and augmentation response. Left calf veins: The visualized calf veins are patent. Miscellaneous: None     No evidence of deep venous thrombosis in either lower extremity. Electronically signed by: Vidal Arroyo MD Date:    05/22/2025 Time:    18:06    CTA Chest Non-Coronary (PE Studies)  Result Date: 5/22/2025  EXAMINATION: CTA CHEST NON CORONARY (PE STUDIES) CLINICAL HISTORY: Pulmonary embolism (PE) suspected, unknown D-dimer; TECHNIQUE: Low dose axial images, sagittal and coronal reformations were obtained from the thoracic inlet to the lung bases following  the IV administration of 100 mL of Omnipaque 350.  Contrast timing was optimized to evaluate the pulmonary arteries.  MIP images were performed. COMPARISON: Radiograph 05/22/2025, CT abdomen and pelvis 05/19/2025.  CT chest 03/05/2025, CTA chest 03/19/2024, CTA chest 12/28/2024 FINDINGS: The structures at the base of the neck are unremarkable.  No significant mediastinal lymphadenopathy.  The heart is not enlarged.  The thoracic aorta tapers normally noting aberrant right subclavian.  There is atherosclerotic calcification of the aorta and in the distribution of the coronary arteries.  The visualized portions of the spleen and adrenal glands are unremarkable.  There is fatty atrophy of the pancreas.  The gallbladder is distended without wall thickening.  The liver is unremarkable.  There is bilateral perinephric fat stranding, correlation with urinalysis recommended. Motion artifact limits evaluation of the airways and pulmonary parenchyma.  There are aerated secretions layering posteriorly within the upper trachea, placing patient at risk for aspiration.  The airways are otherwise patent.  There is bilateral pulmonary emphysematous change.  There is scattered interlobular septal thickening.  There is a nodular focus within the anterior aspect of the left upper lobe measuring in conglomerate 5-6 mm not seen on the previous exam.  There is bilateral basilar dependent atelectasis.  No large focal consolidation.  No pneumothorax. There are post treatment changes within the left hilar region, less conspicuous on current exam. Bolus timing is adequate for evaluation of pulmonary thromboembolism.  Allowing for artifact from motion, no convincing pulmonary arterial filling defects to the level of the segmental branches bilaterally to suggest pulmonary thromboembolism. There is osteopenia.  There are degenerative changes of the spine.  No significant axillary lymphadenopathy.  There is height loss involving the superior  endplate of L1 new since examination 03/05/2025, correlation with any focal tenderness recommended.  There is a sclerotic focus within the posterior elements of T9 stable.     1. Allowing for motion artifact, no convincing pulmonary thromboembolism.  Correlation of these findings with D-dimer and/or lower extremity ultrasound as warranted. 2. Pulmonary emphysematous change noting superimposed mild edema.  There is a nodular focus within the anterior aspect of the left upper lobe, not convincingly seen on the previous examination.  Developing infectious or inflammatory process is a consideration, continued follow-up is recommended to exclude discrete nodule in the setting of malignancy. 3. Aerated secretion within the proximal trachea, places patient at risk for aspiration. 4. Endplate compression deformity of L1, new since examination 03/05/2025 however stable from examination 05/19/2025. 5. Please see above for several additional findings. Electronically signed by: Artur Velazquez MD Date:    05/22/2025 Time:    17:21    X-Ray Foot Complete Left  Result Date: 5/22/2025  EXAMINATION: XR FOOT COMPLETE 3 VIEW LEFT CLINICAL HISTORY: .  Cellulitis, unspecified TECHNIQUE: AP, lateral and oblique views of the left foot were performed. COMPARISON: None FINDINGS: Three views left foot. There is osteopenia.  There are degenerative changes of the foot.  There is remote fracture of the fifth metatarsal.  Bandaging material overlies the lateral aspect of the foot.  No radiopaque foreign body.  There are degenerative changes of the calcaneus.  There is edema primarily about the dorsal aspect of the foot.     1. No convincing acute displaced fracture or dislocation of the foot noting diffuse edema particularly along the dorsal aspect.  Correlation is advised. Electronically signed by: Artur Velazquez MD Date:    05/22/2025 Time:    14:46    X-Ray Chest AP Portable  Result Date: 5/22/2025  EXAMINATION: XR CHEST AP PORTABLE  CLINICAL HISTORY: CHF; TECHNIQUE: Single frontal view of the chest was performed. COMPARISON: 02/07/2025 FINDINGS: The cardiomediastinal silhouette is not enlarged noting calcification of the aorta..  There is no pleural effusion.  The trachea is midline.  The lungs are symmetrically expanded bilaterally with coarse interstitial attenuation bilaterally noting scattered regions of bandlike atelectasis, similar to the previous examination.  No large focal consolidation seen.  There is no pneumothorax.  The osseous structures are remarkable for degenerative change..     1. Chronic appearing interstitial findings, superimposed edema remains a consideration.  No large focal consolidation. Electronically signed by: Artur Velazquez MD Date:    05/22/2025 Time:    13:15    CT Abdomen Pelvis With IV Contrast NO Oral Contrast  Result Date: 5/19/2025  EXAMINATION: CT ABDOMEN PELVIS WITH IV CONTRAST CLINICAL HISTORY: Epigastric pain;SBO rule out; TECHNIQUE: Low dose axial images, sagittal and coronal reformations were obtained from the lung bases to the pubic symphysis following the IV administration of 75 mL of Omnipaque 350 COMPARISON: CT of the chest, abdomen, and pelvis 03/05/2020 FINDINGS: Lower chest: Partially imaged cardiac valvular calcifications.  Atelectasis at the lung bases.  Patchy airspace consolidation in the medial basilar right lower lobe. Liver: Normal contour.  Subcentimeter hypodense lesion right hepatic lobe too small to definitely characterize. Gallbladder and bile ducts: Somewhat hydropic appearance of the gallbladder. No definite radiodense gallstones or pericholecystic inflammation.  No concerning biliary ductal dilatation. Pancreas: Unremarkable. Spleen: Normal contour.  At least 2 accessory splenule suggested. Adrenals: Unremarkable. Kidneys: Unchanged appearance of the kidneys compared to 03/05/2025 CT.  Unchanged presumed cysts in the left kidney.  Additional subcentimeter hypodense lesions are too  small to definitely characterize.  Nonspecific bilateral perinephric stranding again seen. Lymph nodes: No abdominal or pelvic lymphadenopathy. Bowel and mesentery: Postoperative changes of the bowel again noted in keeping with partial colectomy.  Colonic diverticulosis.  Relatively diffuse fluid distended colonic loops.  Short segment focal narrowing of the sigmoid colon (axial series 2, image 110).  Small bowel normal caliber.Appendix not seen and may be surgically absent. Abdominal aorta: Nonaneurysmal.  Moderate to heavy atherosclerosis. Inferior vena cava: Unremarkable. Free fluid or free air: None. Pelvis: Unremarkable. Body wall: Small fat containing umbilical and infraumbilical ventral hernias. Bones: No acute change.  Suspect osseous demineralization.  No change in configuration of recent burst type fracture of the L1 vertebra compared to dedicated CT of the lumbar spine performed 05/14/2025.     1. Relatively diffusely dilated fluid-filled colonic loops, nonspecific.  Findings could reflect a nonspecific diarrheal illness, possibly infectious or noninfectious inflammatory in nature.  Noting this, there is an apparent short segment focal narrowing of the sigmoid colon, which could reflect peristalsis, however stricture or neoplasm difficult to exclude in this region.  Attention on CT follow-up recommended.  Additionally, consider colonoscopy for further characterization, if not recently performed. 2. Patchy airspace consolidation in the basilar right lower lobe, possibly infectious or noninfectious inflammatory in etiology.  Correlation advised. 3. Additional details of chronic and incidental findings, as provided in the body of the report. Electronically signed by: Bolivar Gonsales Date:    05/19/2025 Time:    11:31    US Lower Extremity Veins Bilateral  Result Date: 5/15/2025  EXAMINATION: US LOWER EXTREMITY VEINS BILATERAL CLINICAL HISTORY: r/o DVT; TECHNIQUE: Duplex and color flow Doppler and dynamic  compression was performed of the bilateral lower extremity veins was performed. COMPARISON: None FINDINGS: Right thigh veins: The common femoral, femoral, popliteal, upper greater saphenous, and deep femoral veins are patent and free of thrombus. The veins are normally compressible and have normal phasic flow and augmentation response. Right calf veins: The visualized calf veins are patent. Left thigh veins: The common femoral, femoral, popliteal, upper greater saphenous, and deep femoral veins are patent and free of thrombus. The veins are normally compressible and have normal phasic flow and augmentation response. Left calf veins: The visualized calf veins are patent. Miscellaneous: None     No evidence of deep venous thrombosis in either lower extremity. Electronically signed by: Bolivar Resendiz MD Date:    05/15/2025 Time:    14:36    Echo  Result Date: 5/15/2025    Left Ventricle: The left ventricle is normal in size. Ventricular mass is normal. Normal wall thickness. Normal wall motion. There is low normal systolic function with a visually estimated ejection fraction of 50 - 55%. Ejection fraction is approximately 55%. There is normal diastolic function.   Right Ventricle: The right ventricle is normal in size Wall thickness is normal. Systolic function is normal.   IVC/SVC: Normal venous pressure at 3 mmHg.     MRI Lumbar Spine W WO Cont  Result Date: 5/14/2025  EXAMINATION: MRI LUMBAR SPINE W WO CONTRAST CLINICAL HISTORY: Compression fracture, lumbar; TECHNIQUE: Multiplanar, multisequence MR images were acquired from the thoracolumbar junction to the sacrum without and with the administration of contrast.  The patient received 10 cc of Gadavist IV. COMPARISON: CT L-spine same-date. FINDINGS: Lumbar spine alignment is within normal limits. Acute L1 superior endplate compression fracture, with mild anterior height loss.  2 mm osseous retropulsion.  No resultant spinal canal stenosis or neural foraminal  narrowing. No marrow signal abnormality suspicious for an infiltrative process.  L2 and L5 vertebral body hemangiomas.  There is a some mild increased edema in the L2 vertebral body with no fracture identified. The conus is normal in appearance, and terminates at the L2 level. Mild disc space narrowing at L4-L5.  Multilevel disc desiccation.There are findings of lumbar spondylosis, as below. T12-L1: No spinal canal stenosis or neural foraminal narrowing. L1-L2: No spinal canal stenosis or neural foraminal narrowing. L2-L3: Mild disc bulge.  Mild facet arthropathy and ligamentum flavum thickening.  Mild bilateral foraminal narrowing.  Mild lateral recess stenosis. L3-L4: Mild disc bulge.  Mild facet arthropathy and ligamentum flavum thickening.  Mild bilateral foraminal narrowing.  Mild lateral recess stenosis. L4-L5: Disc bulge with moderate facet arthropathy and ligamentum flavum thickening.  Mild bilateral foraminal narrowing.  Mild spinal canal stenosis. L5-S1: Advanced facet arthropathy.  No spinal canal stenosis or neural foraminal narrowing. The adjacent soft tissue structures demonstrate bilateral renal cysts.  Colonic diverticulosis. Following the administration of intravenous contrast, there is enhancement of the L1 endplate at the level of the fracture site. There is enhancement within the anterior prevertebral soft tissues extending from T12-L1. There is enhancement of the L2 intraosseous hemangioma.     1.  Subacute compression fracture of the L1 superior endplate.  Mild anterior height loss and 2 mm osseous retropulsion.  No resultant spinal canal stenosis or neural foraminal narrowing at that level. 2.  Nonspecific enhancement in the anterior prevertebral soft tissues from T12-L1.  This may be reactive in nature. 3.  Intraosseous hemangioma in the L2 vertebral body with associated minimal increased edema.  Some component of bony contusion at this level with present. 4.  Mild lumbar spondylosis as  described above. Electronically signed by resident: Franki Funes Date:    05/14/2025 Time:    18:26 Electronically signed by: Vidal Arroyo MD Date:    05/14/2025 Time:    18:51    CT Lumbar Spine Without Contrast  Result Date: 5/14/2025  EXAMINATION: CT LUMBAR SPINE WITHOUT CONTRAST CLINICAL HISTORY: Low back pain, increased fracture risk TECHNIQUE: Low dose axial images, sagittal and coronal reformations were performed though the lumbar spine.  Contrast was not administered. COMPARISON: CT chest abdomen pelvis 03/05/2025 FINDINGS: ALIGNMENT: Minimal lumbar levocurvature.  Sagittal alignment is maintained. BONE: Diffuse bony demineralization.  New L1 burst compression deformity when compared to CT 03/05/2025 with mild superior endplate height loss and approximately 3 mm of retropulsion.  No definite fracture extension into the posterior elements.  L2 and L5 vertebral hemangiomas. JOINT: Mild disc space narrowing at L4-L5.  Multilevel facet arthropathy.  Mild degenerative change of the sacroiliac joints, similar to prior. SPINAL CANAL: The conus medullaris and cauda equina nerve roots are difficult to visualize.  No mass or collection. PARASPINAL SOFT TISSUES: Left renal cyst.  Extensive aortoiliac calcific atherosclerosis.  Colonic diverticulosis. SIGNIFICANT FINDINGS BY LEVEL: T12-L1: Facet arthropathy.  No spinal canal stenosis or neural foraminal narrowing. L1-2: No spinal canal stenosis or neural foraminal narrowing. L2-3: Small circumferential disc bulge and facet arthropathy results in mild bilateral neural foraminal narrowing. L3-4: Small circumferential disc bulge and facet arthropathy result in mild bilateral neural foraminal narrowing. L4-5: Circumferential disc bulge and facet arthropathy result in mild spinal canal stenosis and mild bilateral neural foraminal narrowing. L5-S1: No spinal canal stenosis or neural foraminal narrowing.     1. Recent burst compression fracture of L1.  Minimal retropulsion  without spinal canal stenosis.  No CT findings to suggest and underlying osseous lesion. 2. Multilevel degenerative change of the spine as detailed above.  No high-grade spinal canal stenosis or neural foraminal narrowing. This report was flagged in [employer] as abnormal. These findings were discovered at 08:55 on 05/14/2025 and relayed to Prince Desai MD via telephone by Jigar Mims MD at 09:05 on 05/14/2025. Electronically signed by resident: Jigar Mims Date:    05/14/2025 Time:    08:54 Electronically signed by: Raj Boyce MD Date:    05/14/2025 Time:    09:36      Recent Results (from the past 24 hours)   EKG 12-lead    Collection Time: 06/08/25  6:23 PM   Result Value Ref Range    QRS Duration 78 ms    [facility] QTC Calculation 479 ms   POCT glucose    Collection Time: 06/08/25  6:25 PM   Result Value Ref Range    POCT Glucose 145 (H) 70 - 110 mg/dL   Troponin I    Collection Time: 06/08/25  7:37 PM   Result Value Ref Range    Troponin-I 10.800 (H) <=0.026 ng/mL   Magnesium    Collection Time: 06/09/25  5:53 AM   Result Value Ref Range    Magnesium  1.9 1.6 - 2.6 mg/dL   Comprehensive Metabolic Panel    Collection Time: 06/09/25  5:53 AM   Result Value Ref Range    Sodium 134 (L) 136 - 145 mmol/L    Potassium 3.6 3.5 - 5.1 mmol/L    Chloride 87 (L) 95 - 110 mmol/L    CO2 32 (H) 23 - 29 mmol/L    Glucose 98 70 - 110 mg/dL    BUN 25 (H) 8 - 23 mg/dL    Creatinine 0.9 0.5 - 1.4 mg/dL    Calcium 9.0 8.7 - 10.5 mg/dL    Protein Total 6.2 6.0 - 8.4 gm/dL    Albumin 2.2 (L) 3.5 - 5.2 g/dL    Bilirubin Total 0.8 0.1 - 1.0 mg/dL     40 - 150 unit/L    AST 37 11 - 45 unit/L    ALT 63 (H) 10 - 44 unit/L    Anion Gap 15 8 - 16 mmol/L    eGFR >60 >60 mL/min/1.73/m2   Phosphorus    Collection Time: 06/09/25  5:53 AM   Result Value Ref Range    Phosphorus Level 2.4 (L) 2.7 - 4.5 mg/dL   CBC with Differential    Collection Time: 06/09/25  5:53 AM   Result Value Ref Range    WBC 14.42 (H) 3.90 - 12.70 K/uL    RBC  4.23 (L) 4.60 - 6.20 M/uL    HGB 11.9 (L) 14.0 - 18.0 gm/dL    HCT 36.9 (L) 40.0 - 54.0 %    MCV 87 82 - 98 fL    MCH 28.1 27.0 - 31.0 pg    MCHC 32.2 32.0 - 36.0 g/dL    RDW 16.1 (H) 11.5 - 14.5 %    Platelet Count 131 (L) 150 - 450 K/uL    MPV 11.8 9.2 - 12.9 fL    Nucleated RBC 0 <=0 /100 WBC    Neut % 82.2 (H) 38 - 73 %    Lymph % 2.6 (L) 18 - 48 %    Mono % 12.1 4 - 15 %    Eos % 0.2 <=8 %    Basophil % 0.3 <=1.9 %    Imm Grans % 2.6 (H) 0.0 - 0.5 %    Neut # 11.86 (H) 1.8 - 7.7 K/uL    Lymph # 0.37 (L) 1 - 4.8 K/uL    Mono # 1.74 (H) 0.3 - 1 K/uL    Eos # 0.03 <=0.5 K/uL    Baso # 0.04 <=0.2 K/uL    Imm Grans # 0.38 (H) 0.00 - 0.04 K/uL   ISTAT PROCEDURE    Collection Time: 06/09/25  7:20 AM   Result Value Ref Range    POC PH 7.499 (H) 7.35 - 7.45    POC PCO2 56.3 (HH) 35 - 45 mmHg    POC PO2 79 (L) 80 - 100 mmHg    POC HCO3 43.8 (H) 24 - 28 mmol/L    POC BE 21 (H) -2 to 2 mmol/L    POC SATURATED O2 96 95 - 100 %    POC TCO2 45 (H) 23 - 27 mmol/L    Sample ARTERIAL     Site RR     Allens Test Pass     DelSys HFDD    EKG 12-lead    Collection Time: 06/09/25  8:58 AM   Result Value Ref Range    QRS Duration 72 ms    [facility] QTC Calculation 454 ms   Comprehensive metabolic panel    Collection Time: 06/09/25  9:05 AM   Result Value Ref Range    Sodium 135 (L) 136 - 145 mmol/L    Potassium 3.5 3.5 - 5.1 mmol/L    Chloride 88 (L) 95 - 110 mmol/L    CO2 34 (H) 23 - 29 mmol/L    Glucose 109 70 - 110 mg/dL    BUN 26 (H) 8 - 23 mg/dL    Creatinine 0.9 0.5 - 1.4 mg/dL    Calcium 9.0 8.7 - 10.5 mg/dL    Protein Total 6.2 6.0 - 8.4 gm/dL    Albumin 2.3 (L) 3.5 - 5.2 g/dL    Bilirubin Total 0.7 0.1 - 1.0 mg/dL     40 - 150 unit/L    AST 35 11 - 45 unit/L    ALT 62 (H) 10 - 44 unit/L    Anion Gap 13 8 - 16 mmol/L    eGFR >60 >60 mL/min/1.73/m2   Brain natriuretic peptide    Collection Time: 06/09/25  9:05 AM   Result Value Ref Range    BNP 62 0 - 99 pg/mL   Troponin I High Sensitivity    Collection Time: 06/09/25  9:05 AM    Result Value Ref Range    Troponin High Sensitive 12 <=35 ng/L   CBC with Differential    Collection Time: 06/09/25  9:05 AM   Result Value Ref Range    WBC 15.55 (H) 3.90 - 12.70 K/uL    RBC 4.24 (L) 4.60 - 6.20 M/uL    HGB 11.9 (L) 14.0 - 18.0 gm/dL    HCT 36.8 (L) 40.0 - 54.0 %    MCV 87 82 - 98 fL    MCH 28.1 27.0 - 31.0 pg    MCHC 32.3 32.0 - 36.0 g/dL    RDW 16.0 (H) 11.5 - 14.5 %    Platelet Count 129 (L) 150 - 450 K/uL    MPV 10.9 9.2 - 12.9 fL    Nucleated RBC 0 <=0 /100 WBC    Neut % 83.5 (H) 38 - 73 %    Lymph % 2.6 (L) 18 - 48 %    Mono % 11.3 4 - 15 %    Eos % 0.1 <=8 %    Basophil % 0.3 <=1.9 %    Imm Grans % 2.2 (H) 0.0 - 0.5 %    Neut # 12.99 (H) 1.8 - 7.7 K/uL    Lymph # 0.41 (L) 1 - 4.8 K/uL    Mono # 1.75 (H) 0.3 - 1 K/uL    Eos # 0.02 <=0.5 K/uL    Baso # 0.04 <=0.2 K/uL    Imm Grans # 0.34 (H) 0.00 - 0.04 K/uL   Lactic acid, plasma    Collection Time: 06/09/25  9:07 AM   Result Value Ref Range    Lactic Acid Level 0.9 0.5 - 2.2 mmol/L   Influenza A & B by Molecular    Collection Time: 06/09/25  9:10 AM    Specimen: Nasal Swab   Result Value Ref Range    INFLUENZA A MOLECULAR Negative Negative    INFLUENZA B MOLECULAR  Negative Negative   COVID-19 Rapid Screening    Collection Time: 06/09/25  9:10 AM   Result Value Ref Range    SARS COV-2 Molecular Negative Negative   Troponin I High Sensitivity    Collection Time: 06/09/25  2:15 PM   Result Value Ref Range    Troponin High Sensitive 11 <=35 ng/L   Procalcitonin    Collection Time: 06/09/25  2:15 PM   Result Value Ref Range    Procalcitonin 0.43 (H) <0.25 ng/mL   Urinalysis, Reflex to Urine Culture Urine, Clean Catch    Collection Time: 06/09/25  2:46 PM    Specimen: Urine   Result Value Ref Range    Color, UA Yellow Straw, Tanya, Yellow, Light-Orange    Appearance, UA Clear Clear    pH, UA 6.0 5.0 - 8.0    Spec Grav UA 1.010 1.005 - 1.030    Protein, UA 1+ (A) Negative    Glucose, UA Negative Negative    Ketones, UA 1+ (A) Negative     Bilirubin, UA Negative Negative    Blood, UA 1+ (A) Negative    Nitrites, UA Negative Negative    Urobilinogen, UA Negative <2.0 EU/dL    Leukocyte Esterase, UA Negative Negative   Urinalysis Microscopic    Collection Time: 06/09/25  2:46 PM   Result Value Ref Range    RBC, UA 10 (H) 0 - 4 /HPF    WBC, UA 4 0 - 5 /HPF    Bacteria, UA Few (A) None, Rare, Occasional /HPF    Hyaline Casts, UA 2 (H) 0 - 1 /LPF    Microscopic Comment       A1C   Lab Results   Component Value Date    HGBA1C 5.7 (H) 03/06/2024         Assessment/Plan:     Cellulitis to Left lower leg   Dermatitis to left foot   -wash with bath wipes apply lac-hydrin daily per bedside nurse     MASD to Sacrum and Buttocks   Wound care clean with bath wipes apply Triad daily  Turn patient every 2 hours    Rash to:  RIGHT & LEFT GROIN  RIGHT & LEFT ABDOMEN  -clean with bath wipes apply antifungal powder daily per bedside nurse     Pressure wound stage I to right top ankle   -leave JOSSUE monitor     Decreased Mobility   -Patient with decreased bed mobility therefore patient is at high risk for developing wounds and deterioration of any current wounds. Patient needs frequent turning.     Nutrition :  Promote good nutrition to for improved wound healing.      Off-loading:   Follow facility bed policy for proper bed surface   Offload heels per facility protocol   Turn every 2 hours   Use Wheelchair Cushion     Patient Treatment Goals:  Short Term Goals  The wound base will be 25% .,demonstrate 25% more granulation tissue.  Short Term Goals  Patient wound status will improve/maintain without exacerbation infection of deterioration.  Wound Healing  Patient will have a decrease in wound size by 20% in 4 weeks.  Clinical Goals  Prevention of Infection is important for wound healing  Functional Goals:  The patient will achieve proper turning schedule.    -cont medical management     High Risk Because  -Chronic illness with SEVERE exacerbation, progression, or  side effects of treatment.  -Acute or chronic illness or injury that poses a threat to life or bodily function    Notify Sheree Tao NP, or wound care RN if any new issues arise or if there is deterioration in documented wounds.    Sheree Tao FNP-C  2025         [1]   Social History  Tobacco Use    Smoking status: Former     Current packs/day: 0.00     Average packs/day: 1 pack/day for 40.0 years (40.0 ttl pk-yrs)     Types: Cigarettes     Start date: 8/15/1972     Quit date: 8/15/2012     Years since quittin.8     Passive exposure: Never    Smokeless tobacco: Never   Substance Use Topics    Alcohol use: Yes     Alcohol/week: 3.0 standard drinks of alcohol     Types: 3 Cans of beer per week     Comment: maybe 2-3  beers weekly    Drug use: No

## 2025-06-09 NOTE — PT/OT/SLP PROGRESS
Occupational Therapy      Patient Name:  Jordin Doradostephanie Erwin   MRN:  [id number]    Patient not seen today secondary to pt transported to [facility] due to respiratory distress and desaturation.  Will follow-up once pt returns to [facility].    6/9/2025

## 2025-06-09 NOTE — HPI
Jordin Allen Jr. 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 Patient has not felt this chest pain since the episode. Currently no chest pain. Does endorse SOB and cough. Reports he is not coughing up anything because he feels like it is stuck in his chest. Denies fever, chills, chest pain, palpitations,  abdominal pain, n/v/d, dysuria, headaches, or any other symptoms at this time.     In ED: AF, VSS on 6-8L HFNC. CBC with leukocytosis. Hgb 11.9. CMP notable for Na 135, mild elevation in ALT. Phos 2.4. BNP 62. HS trop 12. LA 0.9. covid/flu negative. ABG with pH 7.499, pCO2 56.3, pO2 79, HCO3 43.8. Blood cultures pending. CTA chest with no evidence of acute PE, mild ill-defined airspace opacities in the dependent portions of the right upper and middle lobes when compared to 05/22/2025, nonspecific.  This could be due to atelectasis, aspiration and/or pneumonia 5 mm left upper lobe nodule, unchanged from 05/22/2025 but new when compared to 03/05/2025.  A follow-up chest CT in 1 year could be considered if the patient is at increased risk of developing lung cancer, such as from a smoking history. S/p duoneb, dexamethasone 10mg inj, vanc and zosyn in ED. Admitted to  for further evaluation.

## 2025-06-09 NOTE — PROGRESS NOTES
Called to pt's bedside at 0650 for resp distress & desaturation.  ABG obtained, bronchodilator given.  Pt placed on AIRVO per Dr. Guthrie at 40LPM/25%.  Pt states that he is more comfortable with the higher flow.    EMS here to take pt to ER.  Sent pt with estevan, cell phone &  and his personal CPAP machine.

## 2025-06-09 NOTE — NURSING
Pt transported out of facility to [facility] by [facility] via stretcher/ambulance with belongings.

## 2025-06-09 NOTE — PROGRESS NOTES
"Pharmacokinetic Initial Assessment & Plan: IV Vancomycin    IV Vancomycin 1750 mg x once was given in the ED on 6/9 @ 1442  Continue with pulse dosing as per last recent admission Subsequent doses based on random Vancomycin levels  Obtain a Vancomycin random tomorrow on 6/10 @ 0600 with AM labs  Desired empiric serum trough concentration is 15 to 20 mcg/mL    Pharmacy will continue to follow and monitor vancomycin.    [id number] with any questions regarding this assessment.     Thank you for the consult,   Shante Claudio        Patient brief summary:  Jordin Allen Jr. is a 79 y.o. male initiated on antimicrobial therapy with IV Vancomycin for treatment of suspected Pneumonia     Drug Allergies:   Review of patient's allergies indicates:   Allergen Reactions    Brilinta [ticagrelor] Itching    Lisinopril      Other reaction(s): cough    Metoprolol succinate Rash       Actual Body Weight:   86.2 kg    Renal Function:   Estimated Creatinine Clearance: 74.8 mL/min (based on SCr of 0.9 mg/dL).,     CBC (last 72 hours):  Recent Labs   Lab Result Units 06/09/25  0553 06/09/25  0905   WBC K/uL 14.42* 15.55*   HGB gm/dL 11.9* 11.9*   HCT % 36.9* 36.8*   Platelet Count K/uL 131* 129*   Lymph % % 2.6* 2.6*   Mono % % 12.1 11.3   Eos % % 0.2 0.1   Basophil % % 0.3 0.3       Metabolic Panel (last 72 hours):  Recent Labs   Lab Result Units 06/09/25  0553 06/09/25  0905 06/09/25  1446   Sodium mmol/L 134* 135*  --    Potassium mmol/L 3.6 3.5  --    Chloride mmol/L 87* 88*  --    CO2 mmol/L 32* 34*  --    Glucose mg/dL 98 109  --    Glucose, UA   --   --  Negative   BUN mg/dL 25* 26*  --    Creatinine mg/dL 0.9 0.9  --    Albumin g/dL 2.2* 2.3*  --    Bilirubin Total mg/dL 0.8 0.7  --    ALP unit/L 129 124  --    AST unit/L 37 35  --    ALT unit/L 63* 62*  --    Magnesium  mg/dL 1.9  --   --    Phosphorus Level mg/dL 2.4*  --   --        Drug levels (last 3 results):  No results for input(s): "VANCOMYCINRA", "VANCORANDOM", " ""VANCOMYCINPE", "VANCOPEAK", "VANCOMYCINTR", "VANCOTROUGH" in the last 72 hours.    Microbiologic Results:  Microbiology Results (last 7 days)       Procedure Component Value Units Date/Time    Respiratory Infection Panel (PCR), Nasopharyngeal [[id number]] Collected: 06/09/25 1444    Order Status: Sent Specimen: Nasopharyngeal Swab Updated: 06/09/25 1523    Blood culture #1 **CANNOT BE ORDERED STAT** [[id number]] Collected: 06/09/25 1226    Order Status: Resulted Specimen: Blood from Wrist, Left Updated: 06/09/25 1429    Blood culture #2 **CANNOT BE ORDERED STAT** [[id number]] Collected: 06/09/25 1226    Order Status: Resulted Specimen: Blood from Peripheral, Antecubital, Right Updated: 06/09/25 1429    Culture, Respiratory with Gram Stain [[id number]]     Order Status: Sent Specimen: Respiratory     Influenza A & B by Molecular [[id number]]  (Normal) Collected: 06/09/25 0910    Order Status: Completed Specimen: Nasal Swab Updated: 06/09/25 0955     INFLUENZA A MOLECULAR Negative     INFLUENZA B MOLECULAR  Negative            "

## 2025-06-09 NOTE — ASSESSMENT & PLAN NOTE
Patient has a diagnosis of pneumonia. The cause of the pneumonia is unknown at this time. The pneumonia is stable. The patient has the following signs/symptoms of pneumonia: persistent hypoxia , cough, shortness of breath, and chest pain. The patient does have a current oxygen requirement and the patient does have a home oxygen requirement. I have reviewed the pertinent imaging. The following cultures have been collected: Blood cultures and Sputum culture The culture results are listed below.     Current antimicrobial regimen consists of the antibiotics listed below. Will monitor patient closely and continue current treatment plan unchanged.    Antibiotics (From admission, onward)      Start     Stop Route Frequency Ordered    06/09/25 1215  vancomycin 1750 mg in 0.9% sodium chloride 500 mL IVPB  (ED Adult Sepsis Treatment)         -- IV Once 06/09/25 1207            Microbiology Results (last 7 days)       Procedure Component Value Units Date/Time    Culture, Respiratory with Gram Stain [[id number]]     Order Status: Sent Specimen: Respiratory     Blood culture #1 **CANNOT BE ORDERED STAT** [[id number]] Collected: 06/09/25 1226    Order Status: Sent Specimen: Blood from Wrist, Left     Blood culture #2 **CANNOT BE ORDERED STAT** [[id number]] Collected: 06/09/25 1226    Order Status: Sent Specimen: Blood from Peripheral, Antecubital, Right     Influenza A & B by Molecular [[id number]]  (Normal) Collected: 06/09/25 0910    Order Status: Completed Specimen: Nasal Swab Updated: 06/09/25 0955     INFLUENZA A MOLECULAR Negative     INFLUENZA B MOLECULAR  Negative          - 2/4 SIRS criteria: WBC/RR  - blood cx pending  - respiratory cx pending  - HS trop 12, BNP 62  - CTA chest with: mild ill-defined airspace opacities in the dependent portions of the right upper and middle lobes when compared to 05/22/2025, nonspecific.  This could be due to atelectasis, aspiration and/or pneumonia   - duonebs q6hr  - guaifenesin,  Tessalon Perles prn cough  - s/p vanc + zosyn in ED, will continue for now to cover for potential HAP  - SLP consulted for evaluation

## 2025-06-09 NOTE — NURSING
Attempted to notify Marilou Grimaldo (daughter) of pt's change of status and updated location but no one answered the phone. Also,the voicemailbox was full and a message could not be left.

## 2025-06-09 NOTE — NURSING
Pt in active respiratory distress as evidenced by spO2 84% while on CPAP and heart rate of 122 bpm. Pt denies chest pain at this time but has expressed feeling short of breath and is visibly anxious and in distress. NEDART and LitzyRT placed pt on 5L NC and then AirVo 40 L O2 at 25%. Pt was given prescribed anxiety prn medication. ORACIO Sinha has been updated on the patient's change in condition and has approved pt to be transported to [facility] for further evaluation and treatment. Report was given to SALO Martínez at [facility] ED at 0814 and [facility] has been notified of need of immediate transport and are en route as of 0810 am. Will continue to update plan of care as needed.

## 2025-06-09 NOTE — ED NOTES
Telemetry Verification   Patient placed on Telemetry Box  Verified with War Room  Box # [id number]   Monitor Tech Jalleia   Rate 92   Rhythm AFib w/ PVCs

## 2025-06-09 NOTE — PROGRESS NOTES
[facility]  Wound Care Consult  Attending Physician: Bernardino Guthrie MD  Primary Care Physician: Sierra Landry MD  Date of Admit: 6/3/2025      Chief Complaint    Wound to left leg   History of Present Illness:     Per attending   HPI: Jordin Allen Jr. 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 Patient has not felt this chest pain since the episode. Currently no chest pain. Does endorse SOB and cough. Reports he is not coughing up anything because he feels like it is stuck in his chest. Denies fever, chills, chest pain, palpitations,  abdominal pain, n/v/d, dysuria, headaches, or any other symptoms at this time.      In ED: AF, VSS on 6-8L HFNC. CBC with leukocytosis. Hgb 11.9. CMP notable for Na 135, mild elevation in ALT. Phos 2.4. BNP 62. HS trop 12. LA 0.9. covid/flu negative. ABG with pH 7.499, pCO2 56.3, pO2 79, HCO3 43.8. Blood cultures pending. CTA chest with no evidence of acute PE, mild ill-defined airspace opacities in the dependent portions of the right upper and middle lobes when compared to 05/22/2025, nonspecific.  This could be due to atelectasis, aspiration and/or pneumonia 5 mm left upper lobe nodule, unchanged from 05/22/2025 but new when compared to 03/05/2025.  A follow-up chest CT in 1 year could be considered if the patient is at increased risk of developing lung cancer, such as from a smoking history. S/p duoneb, dexamethasone 10mg inj, vanc and zosyn in ED. Admitted to  for further evaluation.     6/4/2025 Patient seen for wound to left leg  Patient seen lying in bed. No acute distress. Wound care done with nurse. No issues or complaints at time.  Cont POC Plan to f/u on 6/5      Past medical history:     Past Medical History:   Diagnosis Date    Benign neoplasm of colon 10/01/2013    Bronchitis chronic     CAD (coronary artery disease) 10/31/2013    Cataract 2008    COPD (chronic obstructive pulmonary disease)     Emphysema of lung     Encounter for preventive health examination 03/21/2018    GERD (gastroesophageal reflux disease)     Glaucoma 2010    Hearing loss 2015    Heart attack 2013    Hx of colonic polyps     Hypertension     NAFLD (nonalcoholic fatty liver disease) 03/27/2017    SHOLA on CPAP     RLS (restless legs syndrome)     Screen for colon cancer 08/30/2013     Past medical history was reviewed and was otherwise negative except as above.    Past surgical history:     Past Surgical History:   Procedure Laterality Date    bilateral foot surgery  1993    CARDIAC CATHETERIZATION  2013    x1    CATARACT EXTRACTION, BILATERAL      COLON SURGERY  2013    Ace Mott MD    COLONOSCOPY N/A 03/21/2018    Procedure: COLONOSCOPY;  Surgeon: Terry Mott MD;  [facility]  Service: Endoscopy;  Laterality: N/A;    COLONOSCOPY N/A 04/12/2019    Procedure: COLONOSCOPY;  Surgeon: John Mantilla MD;  Location: [facility]  Service: Endoscopy;  Laterality: N/A;    COLONOSCOPY N/A 05/31/2022    Procedure: COLONOSCOPY;  Surgeon: MEDINA Farris MD;  [facility]  Service: Endoscopy;  Laterality: N/A;  fully vacc-inst portal-tb    ENDOBRONCHIAL ULTRASOUND Bilateral 04/30/2024    Procedure: ENDOBRONCHIAL ULTRASOUND (EBUS);  Surgeon: Naveed Aguero MD;  Location: [facility] OR;  Service: Pulmonary;  Laterality: Bilateral;    EYE SURGERY  2011    scrotal abscess removal      TYMPANOSTOMY TUBE PLACEMENT  2017     Past surgical history was reviewed and was noncontributory except as above.    Allergies:     Review of patient's allergies indicates:   Allergen Reactions    Brilinta [ticagrelor] Itching    Lisinopril       Other reaction(s): cough    Metoprolol succinate Rash     Allergies were reviewed and were negative except as above.    Home Medications:     Prior to Admission medications    Medication Sig Start Date End Date Taking? Authorizing Provider   acetaminophen (TYLENOL) 500 MG tablet Take 500 mg by mouth 2 (two) times daily as needed for Pain.    Provider, Historical   albuterol (ACCUNEB) 0.63 mg/3 mL Nebu Inhale 3 mLs (0.63 mg total) by nebulization every 6 (six) hours as needed (if symptoms failed to improve with inhaler). Rescue 12/10/24   Bienvenido Ward MD   albuterol (PROVENTIL/VENTOLIN HFA) 90 mcg/actuation inhaler Inhale 2 puffs into the lungs every 4 (four) hours as needed for Wheezing. 12/10/24   Bienvenido Ward MD   amitriptyline (ELAVIL) 25 MG tablet Take 1 tablet (25 mg total) by mouth once daily.  Patient taking differently: Take 25 mg by mouth every evening. 9/3/24   Sierra Landry MD   aspirin (ECOTRIN) 81 MG EC tablet Take 81 mg by mouth every morning.    Provider, Historical   atorvastatin (LIPITOR) 80 MG tablet Take 1 tablet (80 mg total) by mouth in the morning. 4/21/25   Sierra Landry MD   BETA-CAROTENE,A, W-C & E/MIN (OCUVITE ORAL) Take 1 capsule by mouth once daily.     Provider, Historical   budesonide-glycopyr-formoterol (BREZTRI AEROSPHERE) 160-9-4.8 mcg/actuation HFAA Inhale 2 puffs into the lungs 2 (two) times a day. 12/10/24   Bienvenido Ward MD   dexAMETHasone (DECADRON) 4 MG Tab Take 1 tablet (4 mg total) by mouth 2 (two) times daily.  Patient not taking: Reported on 5/16/2025 3/26/25   Bienvenido Henson MD   echinacea 400 mg Cap Take 1 capsule by mouth once daily.     Provider, Historical   fluticasone propionate (FLONASE) 50 mcg/actuation nasal spray Spray 2 sprays (100 mcg total) in Each Nostril once daily. 10/23/24   Caren Shah MD   furosemide (LASIX) 20 MG tablet Take 1 tablet (20 mg total) by mouth once daily. 4/15/25 4/15/26  Bienvenido Ward MD    ibuprofen (ADVIL,MOTRIN) 200 MG tablet Take 200 mg by mouth every 8 (eight) hours as needed for Pain. 2/21/25   Provider, Historical   latanoprost 0.005 % ophthalmic solution Instill 1 drop into both eyes every night at bedtime 1/30/25      levETIRAcetam (KEPPRA) 500 MG Tab Take 1 tablet (500 mg total) by mouth 2 (two) times daily. 3/24/25 3/24/26  Janene Nelson PA-C   losartan (COZAAR) 100 MG tablet Take 1 tablet (100 mg total) by mouth once daily. 12/19/24   Prince Ba MD   nitroGLYCERIN (NITROSTAT) 0.4 MG SL tablet Place 1 tablet (0.4 mg total) under the tongue every 5 (five) minutes as needed. 5/6/24   Prince Ba MD   omeprazole (PRILOSEC) 20 MG capsule Take 20 mg by mouth once daily.    Provider, Historical   polyethylene glycol (GLYCOLAX) 17 gram/dose powder Take 17 grams by mouth every day for constipation prevention 5/20/25      roflumilast (DALIRESP) 500 mcg Tab Take 500 mcg by mouth every morning. 2/22/25   Provider, Historical   sennosides (SENNA ORAL) Take 1 tablet by mouth daily as needed (Constipation).    Provider, Historical   traZODone (DESYREL) 50 MG tablet Take 1 tablet (50 mg total) by mouth every evening. 1/14/25   Lou Muro NP       Family History:     Family History   Problem Relation Name Age of Onset    Heart disease Mother jc deutsch     Heart attack Mother jc deutsch     Hypertension Mother jc deutsch     No Known Problems Sister      No Known Problems Daughter 2     Diabetes Maternal Grandmother imtiaz jennings     Asthma Neg Hx      Emphysema Neg Hx      Prostate cancer Neg Hx      Cirrhosis Neg Hx       Family history was reviewed and was otherwise negative except as above.    Social History:   Social History[1]  Social history was reviewed and was otherwise negative except as above    Review of Systems   A 10 point review of systems was conducted and was negative except as described in the HPI.  Patient denies Chest Pain.  Patient denies shortness of  "breath.  Patient denies fevers.  Patient denies chills.    Physical Examination:   Triage: BP: 123/77  Pulse: 101  Temp: 97.6 °F (36.4 °C)  Resp: 18  Height: 5' 9" (175.3 cm)  Weight: 86.8 kg (191 lb 5.8 oz) (witness by SALO Escalante and QASIM RTGreg)  BMI (Calculated): 28.2  SpO2: 97 %  Exam: /81 (BP Location: Right arm, Patient Position: Lying)   Pulse 92   Temp 97.4 °F (36.3 °C) (Oral)   Resp (!) 24   Ht 5' 9.02" (1.753 m)   Wt 86.3 kg (190 lb 4.1 oz)   SpO2 (!) 94%   BMI 28.08 kg/m²     Vitals  BP  Min: 95/69  Max: 156/85  Temp  Av.6 °F (36.4 °C)  Min: 97.3 °F (36.3 °C)  Max: 98.1 °F (36.7 °C)  Pulse  Av.3  Min: 71  Max: 128  Resp  Av.8  Min: 15  Max: 41  SpO2  Av.5 %  Min: 85 %  Max: 100 %  Height  Av' 9" (175.3 cm)  Min: 5' 9" (175.3 cm)  Max: 5' 9.02" (175.3 cm)  Weight  Av.8 kg (191 lb 5.6 oz)  Min: 86.2 kg (190 lb)  Max: 87.9 kg (193 lb 12.6 oz)    General Pt alert and oriented, not in apparent distress, good nutrition  Eyes: No conjunctival erythema; normal appearing lids  HEENT: Normocephalic atraumatic, mucous membranes moist  Cardiovascular: No edema  Respiratory: Non-labored, no accessory muscle use, no wheezing  Abdomen: Soft, non tender, non distended, no rebound  Musculoskeletal: no clubbing or cyanosis of digits  Neuro: Grossly intact, weakness upper/lower extremities  Psych: Good mood, full affect, good insight  Skin:              25 1030        Wound 25 Moisture associated dermatitis Left dorsal Foot   Date First Assessed/Time First Assessed: 25   Present on Original Admission: Yes  Primary Wound Type: Moisture associated dermatitis  Side: Left  Orientation: dorsal  Location: Foot  Is this injury device related?: No   Wound Image    Dressing Appearance Open to air   Drainage Amount None   Drainage Characteristics/Odor No odor   Appearance Dry   Red (%), Wound Tissue Color 100 %   Periwound Area Hondo   Care    (nursing to apply " Lachydrin)        Wound 05/19/25 0231 Moisture associated dermatitis Left lateral Foot   Date First Assessed/Time First Assessed: 05/19/25 0231   Present on Original Admission: Yes  Primary Wound Type: Moisture associated dermatitis  Side: Left  Orientation: lateral  Location: Foot   Wound Image                           (took photo of all angles of foot)   Dressing Appearance Open to air   Drainage Amount None   Drainage Characteristics/Odor No odor   Appearance Dry   Tissue loss description Not applicable   Periwound Area Dry   Wound Edges Approximated   Care    (nursing to apply Lachydrin, by nursing)        Wound 05/19/25 0233 Moisture associated dermatitis Left anterior Foot   Date First Assessed/Time First Assessed: 05/19/25 0233   Primary Wound Type: Moisture associated dermatitis  Side: Left  Orientation: anterior  Location: Foot   Wound Image    Dressing Appearance Open to air   Drainage Amount None   Drainage Characteristics/Odor No odor   Appearance Dry   Tissue loss description Not applicable   Red (%), Wound Tissue Color 100 %   Periwound Area Pink   Wound Edges Undefined   Care    (nursing to apply Lachydrin)   Periwound Care Dry periwound area maintained        Wound 05/19/25 0231 Pressure Injury Right anterior Ankle   Date First Assessed/Time First Assessed: 05/19/25 0231   Present on Original Admission: Yes  Primary Wound Type: Pressure Injury  Side: Right  Orientation: anterior  Location: Ankle   Wound Image    Pressure Injury Stage 1   Dressing Appearance Moist drainage   Drainage Amount Scant   Drainage Characteristics/Odor No odor   Appearance Pink   Tissue loss description Partial thickness   Red (%), Wound Tissue Color 100 %   Periwound Area Intact   Wound Length (cm) 0.5 cm   Wound Width (cm) 0.5 cm   Wound Depth (cm) 0.1 cm   Wound Volume (cm^3) 0.013 cm^3   Wound Surface Area (cm^2) 0.2 cm^2   Care Cleansed with:   Dressing    (apply mepi border)   [REMOVED]      Wound 05/22/25 1933 Skin  Tear Right distal Arm   Final Assessment Date/Final Assessment Time: 06/04/25 1030  Date First Assessed/Time First Assessed: 05/22/25 1933   Present on Original Admission: No  Primary Wound Type: Skin Tear  Side: Right  Orientation: distal  Location: Arm  Wound Outcome: Healed   Wound Image    Dressing Appearance Intact   Drainage Amount None   Appearance Dry   Red (%), Wound Tissue Color 100 %   Periwound Area Dry   Wound Length (cm) 0 cm   Wound Width (cm) 0 cm   Wound Depth (cm) 0 cm   Wound Volume (cm^3) 0 cm^3   Wound Surface Area (cm^2) 0 cm^2   Care Cleansed with:;Wound cleanser   Periwound Care Dry periwound area maintained   [REMOVED]      Wound 05/22/25 1933 Pressure Injury Nose   Final Assessment Date/Final Assessment Time: 06/04/25 1030  Date First Assessed/Time First Assessed: 05/22/25 1933   Present on Original Admission: Yes  Primary Wound Type: Pressure Injury  Location: Nose  Is this injury device related?: (c) Yes  Woun...   Wound Image    Dressing Appearance Open to air   Drainage Amount None   Drainage Characteristics/Odor No odor   Appearance Dry   Red (%), Wound Tissue Color 100 %   Periwound Area Dry   Wound Length (cm) 0 cm   Wound Width (cm) 0 cm   Wound Depth (cm) 0 cm   Wound Volume (cm^3) 0 cm^3   Wound Surface Area (cm^2) 0 cm^2   Care Cleansed with:;Wound cleanser   Dressing    (open to air, dry)        Wound 06/04/25 1030 Moisture associated dermatitis Right lateral Abdomen   Date First Assessed/Time First Assessed: 06/04/25 1030   Present on Original Admission: Yes  Primary Wound Type: Moisture associated dermatitis  Side: Right  Orientation: lateral  Location: Abdomen   Wound Image    Dressing Appearance No dressing   Drainage Amount None   Red (%), Wound Tissue Color 100 %   Periwound Area Moist   Care Wound cleanser   Dressing    (applied Triad paste)        Wound 06/04/25 1030 Moisture associated dermatitis Left lateral Abdomen   Date First Assessed/Time First Assessed: 06/04/25  1030   Present on Original Admission: Yes  Primary Wound Type: Moisture associated dermatitis  Side: Left  Orientation: lateral  Location: Abdomen   Wound Image    Dressing Appearance Open to air   Drainage Amount None   Appearance Moist   Red (%), Wound Tissue Color 100 %   Periwound Area Moist   Care Wound cleanser   Dressing    (applied Triad paste)        Wound 06/04/25 1030 Moisture associated dermatitis Right Groin   Date First Assessed/Time First Assessed: 06/04/25 1030   Present on Original Admission: Yes  Primary Wound Type: Moisture associated dermatitis  Side: Right  Location: Groin  Is this injury device related?: No   Wound Image    Dressing Appearance Open to air   Drainage Amount None   Red (%), Wound Tissue Color 100 %   Periwound Area Moist   Care Cleansed with:;Wound cleanser   Dressing    (applied Triad paste)        Wound 06/04/25 1030 Moisture associated dermatitis Left Groin   Date First Assessed/Time First Assessed: 06/04/25 1030   Present on Original Admission: Yes  Primary Wound Type: Moisture associated dermatitis  Side: Left  Location: Groin   Wound Image    Drainage Amount None   Red (%), Wound Tissue Color 100 %   Periwound Area Moist   Care Cleansed with:;Wound cleanser   Dressing    (applied Triad paste)        Wound 06/04/25 1030 Moisture associated dermatitis Sacral spine   Date First Assessed/Time First Assessed: 06/04/25 1030   Present on Original Admission: Yes  Primary Wound Type: (c) Moisture associated dermatitis  Location: (c) Sacral spine   Wound Image    Drainage Amount None   Drainage Characteristics/Odor No odor   Appearance Moist   Periwound Area Moist   Care Wound cleanser   Dressing    (applied Triad paste)             Laboratory:  Most Recent Data:  CBC:   Lab Results   Component Value Date    WBC 15.55 (H) 06/09/2025    HGB 11.9 (L) 06/09/2025    HCT 36.8 (L) 06/09/2025     (L) 06/09/2025    MCV 87 06/09/2025    RDW 16.0 (H) 06/09/2025     BMP:   Lab Results    Component Value Date     (L) 06/09/2025    K 3.5 06/09/2025    CL 88 (L) 06/09/2025    CO2 34 (H) 06/09/2025    BUN 26 (H) 06/09/2025    CREATININE 0.9 06/09/2025     06/09/2025    CALCIUM 9.0 06/09/2025    MG 1.9 06/09/2025    PHOS 2.4 (L) 06/09/2025     LFTs:   Lab Results   Component Value Date    PROT 6.2 06/09/2025    ALBUMIN 2.3 (L) 06/09/2025    BILITOT 0.7 06/09/2025    AST 35 06/09/2025    ALKPHOS 124 06/09/2025    ALT 62 (H) 06/09/2025     Coags:   Lab Results   Component Value Date    INR 1.0 02/07/2025     FLP:   Lab Results   Component Value Date    CHOL 101 (L) 02/07/2025    HDL 35 (L) 02/07/2025    LDLCALC 53.2 (L) 02/07/2025    TRIG 64 02/07/2025    CHOLHDL 34.7 02/07/2025     DM:   Lab Results   Component Value Date    HGBA1C 5.7 (H) 03/06/2024    HGBA1C 5.6 10/12/2023    HGBA1C 5.8 05/30/2013    GLUF 103 05/19/2014    LDLCALC 53.2 (L) 02/07/2025    CREATININE 0.9 06/09/2025     Thyroid:   Lab Results   Component Value Date    TSH 0.261 (L) 06/01/2025    FREET4 0.99 06/01/2025    L9RSQSY 9.1 03/06/2024    S9BNKPS 86 03/07/2017     Anemia:   Lab Results   Component Value Date    IRON 28 (L) 08/29/2024    TIBC 266 08/29/2024    FERRITIN 2,080 (H) 08/29/2024    STKOJUBD11 474 08/29/2024    FOLATE 4.5 08/29/2024     Cardiac:   Lab Results   Component Value Date    TROPONINI 10.800 (H) 06/08/2025    BNP 62 06/09/2025     Urinalysis:   Lab Results   Component Value Date    COLORU Yellow 06/09/2025    SPECGRAV 1.010 06/09/2025    NITRITE Negative 06/09/2025    KETONESU Trace (A) 02/07/2025    UROBILINOGEN Negative 06/09/2025    WBCUA 4 06/09/2025       Trended Lab Data:  Recent Labs   Lab 06/06/25  0526 06/09/25  0553 06/09/25 0905   WBC 12.15 14.42* 15.55*   HGB 12.1* 11.9* 11.9*   PLT 94* 131* 129*   MCV 90 87 87   RDW 16.4* 16.1* 16.0*    134* 135*   K 3.6 3.6 3.5   CL 97 87* 88*   CO2 32* 32* 34*   BUN 27* 25* 26*    98 109   CALCIUM 8.8 9.0 9.0   PROT 5.5* 6.2 6.2  "  ALBUMIN 2.3* 2.2* 2.3*   BILITOT 0.9 0.8 0.7   AST 19 37 35   ALKPHOS 84 129 124   ALT 40 63* 62*       Trended Cardiac Data:  Recent Labs   Lab 06/08/25 1937 06/09/25  0905   TROPONINI 10.800*  --    BNP  --  62       Micro Results  No components found for: "CBLOOD"  No components found for: "CURINE"  No components found for: "CRESPWSM"    Radiology:  CTA Chest Non-Coronary (PE Studies)  Result Date: 6/9/2025  EXAMINATION: CTA CHEST NON CORONARY (PE STUDIES) CLINICAL HISTORY: Pulmonary embolism (PE) suspected, high prob; TECHNIQUE: During intravenous bolus injection of 100 ml of Omnipaque 350 contrast medium and using low dose technique, the chest was surveyed from above the pulmonary apices through the posterior costophrenic angles data was reconstructed for multiplanar images in axial, sagittal and coronal planes and for maximal intensity projection images in the the axial, sagittal and coronal planes. COMPARISON: Multiple priors, most recent CTA 05/22/2025 FINDINGS: Exam quality: Satisfactory. Pulmonary embolism: No acute or chronic pulmonary embolism. Central pulmonary arteries: Normal caliber. Aorta: Normal caliber.  Mild atherosclerosis.  Aberrant right subclavian artery. Heart: No right heart strain. Normal size. Coronary arteries: Multi-vessel calcific atherosclerosis of the coronary arteries. Pericardium: Normal. No effusion, thickening, or calcification. Support tubes and lines: None. Base of neck/thyroid: Normal. Lymph nodes: No supraclavicular, axillary, internal mammary, mediastinal, or hilar adenopathy. Esophagus: Normal. Pleura: No effusion, thickening, or calcification. Upper abdomen: Accessory splenule.  Fatty atrophy of the pancreas. Body wall: Unremarkable Airways: Normal. Lungs: Centrilobular and paraseptal emphysema.  Treatment changes of the left hilar region with associated volume loss and architectural distortion, stable when compared to prior imaging.  Right bandlike scarring versus " atelectasis.  Left lower lobe calcified granuloma.  Ill-defined airspace opacities present in the dependent portions of the right upper and middle lobes are new when compared to 05/22/2025.  A 4 mm nodule in the left upper lobe (series 3, image 257) is unchanged from 05/22/2025 but new when compared to 03/05/2025. Bones: Degenerative changes of the spine.  Height loss of the superioror endplate of L1, unchanged from 02/20/2025.  Unchanged sclerotic focus in the posterior elements of T9.     No evidence of acute pulmonary embolism. New mild ill-defined airspace opacities in the dependent portions of the right upper and middle lobes when compared to 05/22/2025, nonspecific.  This could be due to atelectasis, aspiration and/or pneumonia. 5 mm left upper lobe nodule, unchanged from 05/22/2025 but new when compared to 03/05/2025.  A follow-up chest CT in 1 year could be considered if the patient is at increased risk of developing lung cancer, such as from a smoking history. Additional findings as above. Electronically signed by resident: Madeline Iniguez Date:    06/09/2025 Time:    10:55 Electronically signed by: Anna Brown Date:    06/09/2025 Time:    11:55    X-Ray Chest AP Portable  Result Date: 6/9/2025  EXAMINATION: XR CHEST AP PORTABLE CLINICAL HISTORY: sob; TECHNIQUE: Single frontal view of the chest was performed. COMPARISON: 06/01/25 FINDINGS: Cardiac size is normal.  Lungs are clear with no significant infiltrate or vascular congestion.  No pleural fluid is seen.     See above Electronically signed by: Prince Ovalle MD Date:    06/09/2025 Time:    09:29    X-Ray Chest AP Portable  Result Date: 6/1/2025  EXAMINATION: XR CHEST AP PORTABLE CLINICAL HISTORY: palpitations; TECHNIQUE: Single frontal view of the chest was performed. COMPARISON: 05/26/2025. FINDINGS: The lungs are well expanded and clear. No focal opacities are seen. The pleural spaces are clear. The cardiac silhouette is unremarkable. The  visualized osseous structures are unremarkable.     No acute abnormality. Electronically signed by: Popeye Mckeon Date:    06/01/2025 Time:    22:33    X-Ray Chest AP Portable  Result Date: 5/26/2025  EXAMINATION: XR CHEST AP PORTABLE CLINICAL HISTORY: sob; FINDINGS: Chest one view AP portable. Heart size is normal.  Lungs are clear.  The bones are noncontributory.     No acute process seen. Electronically signed by: Alexander Blankenship MD Date:    05/26/2025 Time:    09:16    X-Ray Chest AP Portable  Result Date: 5/25/2025  EXAMINATION: XR CHEST AP PORTABLE CLINICAL HISTORY: chf; TECHNIQUE: Single frontal view of the chest was performed. COMPARISON: 05/22/2025 FINDINGS: The cardiomediastinal silhouette is stable in configuration noting calcification of the aorta..  There is no pleural effusion.  The trachea is midline.  The lungs are symmetrically expanded bilaterally with coarse interstitial attenuation, similar to the previous examination.  There is increased parenchymal attenuation projected over the medial aspect of the right lower lung zone, may reflect atelectasis, developing consolidation not excluded..  There is no pneumothorax.  The osseous structures are unchanged..     As above Electronically signed by: Artur Velazquez MD Date:    05/25/2025 Time:    19:17    Echo  Result Date: 5/23/2025    Tachycardia was present during the study   Erall the study quality was technically difficult. valves not well visualized.   Left Ventricle: The left ventricle is normal in size. Normal wall thickness. Normal wall motion. There is normal systolic function with a visually estimated ejection fraction of 65 - 70%. There is normal diastolic function. Normal left ventricular filling pressure.   Right Ventricle: The right ventricle is normal in size Wall thickness is normal. Systolic function is normal.   Left Atrium: Left atrium was not well visualized.   Right Atrium: Not well visualized due to poor acoustic window.   Aortic  Valve: Not well visualized due to poor acoustic window.   Mitral Valve: Not well visualized due to poor acoustic window.   Tricuspid Valve: Not well visualized due to poor acoustic window.   Pulmonic Valve: Not well visualized due to poor acoustic window.   Aorta: The aortic root is normal in size measuring 2.79 cm. The ascending aorta is normal in size measuring 2.7 cm.   IVC/SVC: Normal venous pressure at 3 mmHg.   Pericardium: There is no pericardial effusion.     US Lower Extremity Veins Bilateral  Result Date: 5/22/2025  EXAMINATION: US LOWER EXTREMITY VEINS BILATERAL CLINICAL HISTORY: dvt; TECHNIQUE: Duplex and color flow Doppler and dynamic compression was performed of the bilateral lower extremity veins was performed. COMPARISON: 05/15/2025. FINDINGS: Right thigh veins: The common femoral, femoral, popliteal, upper greater saphenous, and deep femoral veins are patent and free of thrombus. The veins are normally compressible and have normal phasic flow and augmentation response. Right calf veins: The visualized calf veins are patent. Left thigh veins: The common femoral, femoral, popliteal, upper greater saphenous, and deep femoral veins are patent and free of thrombus. The veins are normally compressible and have normal phasic flow and augmentation response. Left calf veins: The visualized calf veins are patent. Miscellaneous: None     No evidence of deep venous thrombosis in either lower extremity. Electronically signed by: Vidal Arroyo MD Date:    05/22/2025 Time:    18:06    CTA Chest Non-Coronary (PE Studies)  Result Date: 5/22/2025  EXAMINATION: CTA CHEST NON CORONARY (PE STUDIES) CLINICAL HISTORY: Pulmonary embolism (PE) suspected, unknown D-dimer; TECHNIQUE: Low dose axial images, sagittal and coronal reformations were obtained from the thoracic inlet to the lung bases following the IV administration of 100 mL of Omnipaque 350.  Contrast timing was optimized to evaluate the pulmonary arteries.  MIP  images were performed. COMPARISON: Radiograph 05/22/2025, CT abdomen and pelvis 05/19/2025.  CT chest 03/05/2025, CTA chest 03/19/2024, CTA chest 12/28/2024 FINDINGS: The structures at the base of the neck are unremarkable.  No significant mediastinal lymphadenopathy.  The heart is not enlarged.  The thoracic aorta tapers normally noting aberrant right subclavian.  There is atherosclerotic calcification of the aorta and in the distribution of the coronary arteries.  The visualized portions of the spleen and adrenal glands are unremarkable.  There is fatty atrophy of the pancreas.  The gallbladder is distended without wall thickening.  The liver is unremarkable.  There is bilateral perinephric fat stranding, correlation with urinalysis recommended. Motion artifact limits evaluation of the airways and pulmonary parenchyma.  There are aerated secretions layering posteriorly within the upper trachea, placing patient at risk for aspiration.  The airways are otherwise patent.  There is bilateral pulmonary emphysematous change.  There is scattered interlobular septal thickening.  There is a nodular focus within the anterior aspect of the left upper lobe measuring in conglomerate 5-6 mm not seen on the previous exam.  There is bilateral basilar dependent atelectasis.  No large focal consolidation.  No pneumothorax. There are post treatment changes within the left hilar region, less conspicuous on current exam. Bolus timing is adequate for evaluation of pulmonary thromboembolism.  Allowing for artifact from motion, no convincing pulmonary arterial filling defects to the level of the segmental branches bilaterally to suggest pulmonary thromboembolism. There is osteopenia.  There are degenerative changes of the spine.  No significant axillary lymphadenopathy.  There is height loss involving the superior endplate of L1 new since examination 03/05/2025, correlation with any focal tenderness recommended.  There is a sclerotic  focus within the posterior elements of T9 stable.     1. Allowing for motion artifact, no convincing pulmonary thromboembolism.  Correlation of these findings with D-dimer and/or lower extremity ultrasound as warranted. 2. Pulmonary emphysematous change noting superimposed mild edema.  There is a nodular focus within the anterior aspect of the left upper lobe, not convincingly seen on the previous examination.  Developing infectious or inflammatory process is a consideration, continued follow-up is recommended to exclude discrete nodule in the setting of malignancy. 3. Aerated secretion within the proximal trachea, places patient at risk for aspiration. 4. Endplate compression deformity of L1, new since examination 03/05/2025 however stable from examination 05/19/2025. 5. Please see above for several additional findings. Electronically signed by: Artur Velazquez MD Date:    05/22/2025 Time:    17:21    X-Ray Foot Complete Left  Result Date: 5/22/2025  EXAMINATION: XR FOOT COMPLETE 3 VIEW LEFT CLINICAL HISTORY: .  Cellulitis, unspecified TECHNIQUE: AP, lateral and oblique views of the left foot were performed. COMPARISON: None FINDINGS: Three views left foot. There is osteopenia.  There are degenerative changes of the foot.  There is remote fracture of the fifth metatarsal.  Bandaging material overlies the lateral aspect of the foot.  No radiopaque foreign body.  There are degenerative changes of the calcaneus.  There is edema primarily about the dorsal aspect of the foot.     1. No convincing acute displaced fracture or dislocation of the foot noting diffuse edema particularly along the dorsal aspect.  Correlation is advised. Electronically signed by: Artur Velazquez MD Date:    05/22/2025 Time:    14:46    X-Ray Chest AP Portable  Result Date: 5/22/2025  EXAMINATION: XR CHEST AP PORTABLE CLINICAL HISTORY: CHF; TECHNIQUE: Single frontal view of the chest was performed. COMPARISON: 02/07/2025 FINDINGS: The  cardiomediastinal silhouette is not enlarged noting calcification of the aorta..  There is no pleural effusion.  The trachea is midline.  The lungs are symmetrically expanded bilaterally with coarse interstitial attenuation bilaterally noting scattered regions of bandlike atelectasis, similar to the previous examination.  No large focal consolidation seen.  There is no pneumothorax.  The osseous structures are remarkable for degenerative change..     1. Chronic appearing interstitial findings, superimposed edema remains a consideration.  No large focal consolidation. Electronically signed by: Artur Velazquez MD Date:    05/22/2025 Time:    13:15    CT Abdomen Pelvis With IV Contrast NO Oral Contrast  Result Date: 5/19/2025  EXAMINATION: CT ABDOMEN PELVIS WITH IV CONTRAST CLINICAL HISTORY: Epigastric pain;SBO rule out; TECHNIQUE: Low dose axial images, sagittal and coronal reformations were obtained from the lung bases to the pubic symphysis following the IV administration of 75 mL of Omnipaque 350 COMPARISON: CT of the chest, abdomen, and pelvis 03/05/2020 FINDINGS: Lower chest: Partially imaged cardiac valvular calcifications.  Atelectasis at the lung bases.  Patchy airspace consolidation in the medial basilar right lower lobe. Liver: Normal contour.  Subcentimeter hypodense lesion right hepatic lobe too small to definitely characterize. Gallbladder and bile ducts: Somewhat hydropic appearance of the gallbladder. No definite radiodense gallstones or pericholecystic inflammation.  No concerning biliary ductal dilatation. Pancreas: Unremarkable. Spleen: Normal contour.  At least 2 accessory splenule suggested. Adrenals: Unremarkable. Kidneys: Unchanged appearance of the kidneys compared to 03/05/2025 CT.  Unchanged presumed cysts in the left kidney.  Additional subcentimeter hypodense lesions are too small to definitely characterize.  Nonspecific bilateral perinephric stranding again seen. Lymph nodes: No abdominal  or pelvic lymphadenopathy. Bowel and mesentery: Postoperative changes of the bowel again noted in keeping with partial colectomy.  Colonic diverticulosis.  Relatively diffuse fluid distended colonic loops.  Short segment focal narrowing of the sigmoid colon (axial series 2, image 110).  Small bowel normal caliber.Appendix not seen and may be surgically absent. Abdominal aorta: Nonaneurysmal.  Moderate to heavy atherosclerosis. Inferior vena cava: Unremarkable. Free fluid or free air: None. Pelvis: Unremarkable. Body wall: Small fat containing umbilical and infraumbilical ventral hernias. Bones: No acute change.  Suspect osseous demineralization.  No change in configuration of recent burst type fracture of the L1 vertebra compared to dedicated CT of the lumbar spine performed 05/14/2025.     1. Relatively diffusely dilated fluid-filled colonic loops, nonspecific.  Findings could reflect a nonspecific diarrheal illness, possibly infectious or noninfectious inflammatory in nature.  Noting this, there is an apparent short segment focal narrowing of the sigmoid colon, which could reflect peristalsis, however stricture or neoplasm difficult to exclude in this region.  Attention on CT follow-up recommended.  Additionally, consider colonoscopy for further characterization, if not recently performed. 2. Patchy airspace consolidation in the basilar right lower lobe, possibly infectious or noninfectious inflammatory in etiology.  Correlation advised. 3. Additional details of chronic and incidental findings, as provided in the body of the report. Electronically signed by: Bolivar Gonsales Date:    05/19/2025 Time:    11:31    US Lower Extremity Veins Bilateral  Result Date: 5/15/2025  EXAMINATION: US LOWER EXTREMITY VEINS BILATERAL CLINICAL HISTORY: r/o DVT; TECHNIQUE: Duplex and color flow Doppler and dynamic compression was performed of the bilateral lower extremity veins was performed. COMPARISON: None FINDINGS: Right thigh  veins: The common femoral, femoral, popliteal, upper greater saphenous, and deep femoral veins are patent and free of thrombus. The veins are normally compressible and have normal phasic flow and augmentation response. Right calf veins: The visualized calf veins are patent. Left thigh veins: The common femoral, femoral, popliteal, upper greater saphenous, and deep femoral veins are patent and free of thrombus. The veins are normally compressible and have normal phasic flow and augmentation response. Left calf veins: The visualized calf veins are patent. Miscellaneous: None     No evidence of deep venous thrombosis in either lower extremity. Electronically signed by: Bolivar Resendiz MD Date:    05/15/2025 Time:    14:36    Echo  Result Date: 5/15/2025    Left Ventricle: The left ventricle is normal in size. Ventricular mass is normal. Normal wall thickness. Normal wall motion. There is low normal systolic function with a visually estimated ejection fraction of 50 - 55%. Ejection fraction is approximately 55%. There is normal diastolic function.   Right Ventricle: The right ventricle is normal in size Wall thickness is normal. Systolic function is normal.   IVC/SVC: Normal venous pressure at 3 mmHg.     MRI Lumbar Spine W WO Cont  Result Date: 5/14/2025  EXAMINATION: MRI LUMBAR SPINE W WO CONTRAST CLINICAL HISTORY: Compression fracture, lumbar; TECHNIQUE: Multiplanar, multisequence MR images were acquired from the thoracolumbar junction to the sacrum without and with the administration of contrast.  The patient received 10 cc of Gadavist IV. COMPARISON: CT L-spine same-date. FINDINGS: Lumbar spine alignment is within normal limits. Acute L1 superior endplate compression fracture, with mild anterior height loss.  2 mm osseous retropulsion.  No resultant spinal canal stenosis or neural foraminal narrowing. No marrow signal abnormality suspicious for an infiltrative process.  L2 and L5 vertebral body hemangiomas.  There  is a some mild increased edema in the L2 vertebral body with no fracture identified. The conus is normal in appearance, and terminates at the L2 level. Mild disc space narrowing at L4-L5.  Multilevel disc desiccation.There are findings of lumbar spondylosis, as below. T12-L1: No spinal canal stenosis or neural foraminal narrowing. L1-L2: No spinal canal stenosis or neural foraminal narrowing. L2-L3: Mild disc bulge.  Mild facet arthropathy and ligamentum flavum thickening.  Mild bilateral foraminal narrowing.  Mild lateral recess stenosis. L3-L4: Mild disc bulge.  Mild facet arthropathy and ligamentum flavum thickening.  Mild bilateral foraminal narrowing.  Mild lateral recess stenosis. L4-L5: Disc bulge with moderate facet arthropathy and ligamentum flavum thickening.  Mild bilateral foraminal narrowing.  Mild spinal canal stenosis. L5-S1: Advanced facet arthropathy.  No spinal canal stenosis or neural foraminal narrowing. The adjacent soft tissue structures demonstrate bilateral renal cysts.  Colonic diverticulosis. Following the administration of intravenous contrast, there is enhancement of the L1 endplate at the level of the fracture site. There is enhancement within the anterior prevertebral soft tissues extending from T12-L1. There is enhancement of the L2 intraosseous hemangioma.     1.  Subacute compression fracture of the L1 superior endplate.  Mild anterior height loss and 2 mm osseous retropulsion.  No resultant spinal canal stenosis or neural foraminal narrowing at that level. 2.  Nonspecific enhancement in the anterior prevertebral soft tissues from T12-L1.  This may be reactive in nature. 3.  Intraosseous hemangioma in the L2 vertebral body with associated minimal increased edema.  Some component of bony contusion at this level with present. 4.  Mild lumbar spondylosis as described above. Electronically signed by resident: Franki Funes Date:    05/14/2025 Time:    18:26 Electronically signed by: Vidal  MD Dina Date:    05/14/2025 Time:    18:51    CT Lumbar Spine Without Contrast  Result Date: 5/14/2025  EXAMINATION: CT LUMBAR SPINE WITHOUT CONTRAST CLINICAL HISTORY: Low back pain, increased fracture risk TECHNIQUE: Low dose axial images, sagittal and coronal reformations were performed though the lumbar spine.  Contrast was not administered. COMPARISON: CT chest abdomen pelvis 03/05/2025 FINDINGS: ALIGNMENT: Minimal lumbar levocurvature.  Sagittal alignment is maintained. BONE: Diffuse bony demineralization.  New L1 burst compression deformity when compared to CT 03/05/2025 with mild superior endplate height loss and approximately 3 mm of retropulsion.  No definite fracture extension into the posterior elements.  L2 and L5 vertebral hemangiomas. JOINT: Mild disc space narrowing at L4-L5.  Multilevel facet arthropathy.  Mild degenerative change of the sacroiliac joints, similar to prior. SPINAL CANAL: The conus medullaris and cauda equina nerve roots are difficult to visualize.  No mass or collection. PARASPINAL SOFT TISSUES: Left renal cyst.  Extensive aortoiliac calcific atherosclerosis.  Colonic diverticulosis. SIGNIFICANT FINDINGS BY LEVEL: T12-L1: Facet arthropathy.  No spinal canal stenosis or neural foraminal narrowing. L1-2: No spinal canal stenosis or neural foraminal narrowing. L2-3: Small circumferential disc bulge and facet arthropathy results in mild bilateral neural foraminal narrowing. L3-4: Small circumferential disc bulge and facet arthropathy result in mild bilateral neural foraminal narrowing. L4-5: Circumferential disc bulge and facet arthropathy result in mild spinal canal stenosis and mild bilateral neural foraminal narrowing. L5-S1: No spinal canal stenosis or neural foraminal narrowing.     1. Recent burst compression fracture of L1.  Minimal retropulsion without spinal canal stenosis.  No CT findings to suggest and underlying osseous lesion. 2. Multilevel degenerative change of the spine  as detailed above.  No high-grade spinal canal stenosis or neural foraminal narrowing. This report was flagged in [employer] as abnormal. These findings were discovered at 08:55 on 05/14/2025 and relayed to Prince Desai MD via telephone by Jigar Mims MD at 09:05 on 05/14/2025. Electronically signed by resident: Jigar Mims Date:    05/14/2025 Time:    08:54 Electronically signed by: Raj Boyce MD Date:    05/14/2025 Time:    09:36      Recent Results (from the past 24 hours)   EKG 12-lead    Collection Time: 06/08/25  6:23 PM   Result Value Ref Range    QRS Duration 78 ms    [facility] QTC Calculation 479 ms   POCT glucose    Collection Time: 06/08/25  6:25 PM   Result Value Ref Range    POCT Glucose 145 (H) 70 - 110 mg/dL   Troponin I    Collection Time: 06/08/25  7:37 PM   Result Value Ref Range    Troponin-I 10.800 (H) <=0.026 ng/mL   Magnesium    Collection Time: 06/09/25  5:53 AM   Result Value Ref Range    Magnesium  1.9 1.6 - 2.6 mg/dL   Comprehensive Metabolic Panel    Collection Time: 06/09/25  5:53 AM   Result Value Ref Range    Sodium 134 (L) 136 - 145 mmol/L    Potassium 3.6 3.5 - 5.1 mmol/L    Chloride 87 (L) 95 - 110 mmol/L    CO2 32 (H) 23 - 29 mmol/L    Glucose 98 70 - 110 mg/dL    BUN 25 (H) 8 - 23 mg/dL    Creatinine 0.9 0.5 - 1.4 mg/dL    Calcium 9.0 8.7 - 10.5 mg/dL    Protein Total 6.2 6.0 - 8.4 gm/dL    Albumin 2.2 (L) 3.5 - 5.2 g/dL    Bilirubin Total 0.8 0.1 - 1.0 mg/dL     40 - 150 unit/L    AST 37 11 - 45 unit/L    ALT 63 (H) 10 - 44 unit/L    Anion Gap 15 8 - 16 mmol/L    eGFR >60 >60 mL/min/1.73/m2   Phosphorus    Collection Time: 06/09/25  5:53 AM   Result Value Ref Range    Phosphorus Level 2.4 (L) 2.7 - 4.5 mg/dL   CBC with Differential    Collection Time: 06/09/25  5:53 AM   Result Value Ref Range    WBC 14.42 (H) 3.90 - 12.70 K/uL    RBC 4.23 (L) 4.60 - 6.20 M/uL    HGB 11.9 (L) 14.0 - 18.0 gm/dL    HCT 36.9 (L) 40.0 - 54.0 %    MCV 87 82 - 98 fL    MCH 28.1 27.0 - 31.0  pg    MCHC 32.2 32.0 - 36.0 g/dL    RDW 16.1 (H) 11.5 - 14.5 %    Platelet Count 131 (L) 150 - 450 K/uL    MPV 11.8 9.2 - 12.9 fL    Nucleated RBC 0 <=0 /100 WBC    Neut % 82.2 (H) 38 - 73 %    Lymph % 2.6 (L) 18 - 48 %    Mono % 12.1 4 - 15 %    Eos % 0.2 <=8 %    Basophil % 0.3 <=1.9 %    Imm Grans % 2.6 (H) 0.0 - 0.5 %    Neut # 11.86 (H) 1.8 - 7.7 K/uL    Lymph # 0.37 (L) 1 - 4.8 K/uL    Mono # 1.74 (H) 0.3 - 1 K/uL    Eos # 0.03 <=0.5 K/uL    Baso # 0.04 <=0.2 K/uL    Imm Grans # 0.38 (H) 0.00 - 0.04 K/uL   ISTAT PROCEDURE    Collection Time: 06/09/25  7:20 AM   Result Value Ref Range    POC PH 7.499 (H) 7.35 - 7.45    POC PCO2 56.3 (HH) 35 - 45 mmHg    POC PO2 79 (L) 80 - 100 mmHg    POC HCO3 43.8 (H) 24 - 28 mmol/L    POC BE 21 (H) -2 to 2 mmol/L    POC SATURATED O2 96 95 - 100 %    POC TCO2 45 (H) 23 - 27 mmol/L    Sample ARTERIAL     Site RR     Allens Test Pass     DelSys HFDD    EKG 12-lead    Collection Time: 06/09/25  8:58 AM   Result Value Ref Range    QRS Duration 72 ms    [facility]HS QTC Calculation 454 ms   Comprehensive metabolic panel    Collection Time: 06/09/25  9:05 AM   Result Value Ref Range    Sodium 135 (L) 136 - 145 mmol/L    Potassium 3.5 3.5 - 5.1 mmol/L    Chloride 88 (L) 95 - 110 mmol/L    CO2 34 (H) 23 - 29 mmol/L    Glucose 109 70 - 110 mg/dL    BUN 26 (H) 8 - 23 mg/dL    Creatinine 0.9 0.5 - 1.4 mg/dL    Calcium 9.0 8.7 - 10.5 mg/dL    Protein Total 6.2 6.0 - 8.4 gm/dL    Albumin 2.3 (L) 3.5 - 5.2 g/dL    Bilirubin Total 0.7 0.1 - 1.0 mg/dL     40 - 150 unit/L    AST 35 11 - 45 unit/L    ALT 62 (H) 10 - 44 unit/L    Anion Gap 13 8 - 16 mmol/L    eGFR >60 >60 mL/min/1.73/m2   Brain natriuretic peptide    Collection Time: 06/09/25  9:05 AM   Result Value Ref Range    BNP 62 0 - 99 pg/mL   Troponin I High Sensitivity    Collection Time: 06/09/25  9:05 AM   Result Value Ref Range    Troponin High Sensitive 12 <=35 ng/L   CBC with Differential    Collection Time: 06/09/25  9:05 AM    Result Value Ref Range    WBC 15.55 (H) 3.90 - 12.70 K/uL    RBC 4.24 (L) 4.60 - 6.20 M/uL    HGB 11.9 (L) 14.0 - 18.0 gm/dL    HCT 36.8 (L) 40.0 - 54.0 %    MCV 87 82 - 98 fL    MCH 28.1 27.0 - 31.0 pg    MCHC 32.3 32.0 - 36.0 g/dL    RDW 16.0 (H) 11.5 - 14.5 %    Platelet Count 129 (L) 150 - 450 K/uL    MPV 10.9 9.2 - 12.9 fL    Nucleated RBC 0 <=0 /100 WBC    Neut % 83.5 (H) 38 - 73 %    Lymph % 2.6 (L) 18 - 48 %    Mono % 11.3 4 - 15 %    Eos % 0.1 <=8 %    Basophil % 0.3 <=1.9 %    Imm Grans % 2.2 (H) 0.0 - 0.5 %    Neut # 12.99 (H) 1.8 - 7.7 K/uL    Lymph # 0.41 (L) 1 - 4.8 K/uL    Mono # 1.75 (H) 0.3 - 1 K/uL    Eos # 0.02 <=0.5 K/uL    Baso # 0.04 <=0.2 K/uL    Imm Grans # 0.34 (H) 0.00 - 0.04 K/uL   Lactic acid, plasma    Collection Time: 06/09/25  9:07 AM   Result Value Ref Range    Lactic Acid Level 0.9 0.5 - 2.2 mmol/L   Influenza A & B by Molecular    Collection Time: 06/09/25  9:10 AM    Specimen: Nasal Swab   Result Value Ref Range    INFLUENZA A MOLECULAR Negative Negative    INFLUENZA B MOLECULAR  Negative Negative   COVID-19 Rapid Screening    Collection Time: 06/09/25  9:10 AM   Result Value Ref Range    SARS COV-2 Molecular Negative Negative   Troponin I High Sensitivity    Collection Time: 06/09/25  2:15 PM   Result Value Ref Range    Troponin High Sensitive 11 <=35 ng/L   Procalcitonin    Collection Time: 06/09/25  2:15 PM   Result Value Ref Range    Procalcitonin 0.43 (H) <0.25 ng/mL   Urinalysis, Reflex to Urine Culture Urine, Clean Catch    Collection Time: 06/09/25  2:46 PM    Specimen: Urine   Result Value Ref Range    Color, UA Yellow Straw, Tanya, Yellow, Light-Orange    Appearance, UA Clear Clear    pH, UA 6.0 5.0 - 8.0    Spec Grav UA 1.010 1.005 - 1.030    Protein, UA 1+ (A) Negative    Glucose, UA Negative Negative    Ketones, UA 1+ (A) Negative    Bilirubin, UA Negative Negative    Blood, UA 1+ (A) Negative    Nitrites, UA Negative Negative    Urobilinogen, UA Negative <2.0 EU/dL     Leukocyte Esterase, UA Negative Negative   Urinalysis Microscopic    Collection Time: 06/09/25  2:46 PM   Result Value Ref Range    RBC, UA 10 (H) 0 - 4 /HPF    WBC, UA 4 0 - 5 /HPF    Bacteria, UA Few (A) None, Rare, Occasional /HPF    Hyaline Casts, UA 2 (H) 0 - 1 /LPF    Microscopic Comment       A1C   Lab Results   Component Value Date    HGBA1C 5.7 (H) 03/06/2024         Assessment/Plan:     Cellulitis to Left lower leg   Dermatitis to left foot   -wash with bath wipes apply lac-hydrin daily per bedside nurse     MASD to Sacrum and Buttocks   Wound care clean with bath wipes apply Triad daily  Turn patient every 2 hours    Rash to:  RIGHT & LEFT GROIN  RIGHT & LEFT ABDOMEN  -clean with bath wipes apply antifungal powder daily per bedside nurse     Pressure wound stage I to right top ankle   -leave JOSSUE monitor     Decreased Mobility   -Patient with decreased bed mobility therefore patient is at high risk for developing wounds and deterioration of any current wounds. Patient needs frequent turning.     Nutrition :  Promote good nutrition to for improved wound healing.      Off-loading:   Follow facility bed policy for proper bed surface   Offload heels per facility protocol   Turn every 2 hours   Use Wheelchair Cushion     Patient Treatment Goals:  Short Term Goals  The wound base will be 25% .,demonstrate 25% more granulation tissue.  Short Term Goals  Patient wound status will improve/maintain without exacerbation infection of deterioration.  Wound Healing  Patient will have a decrease in wound size by 20% in 4 weeks.  Clinical Goals  Prevention of Infection is important for wound healing  Functional Goals:  The patient will achieve proper turning schedule.    -cont medical management     High Risk Because  -Chronic illness with SEVERE exacerbation, progression, or side effects of treatment.  -Acute or chronic illness or injury that poses a threat to life or bodily function    Notify hSeree Tao  NP, or wound care RN if any new issues arise or if there is deterioration in documented wounds.    Sheree Dinh FNP-C  2025         [1]   Social History  Tobacco Use    Smoking status: Former     Current packs/day: 0.00     Average packs/day: 1 pack/day for 40.0 years (40.0 ttl pk-yrs)     Types: Cigarettes     Start date: 8/15/1972     Quit date: 8/15/2012     Years since quittin.8     Passive exposure: Never    Smokeless tobacco: Never   Substance Use Topics    Alcohol use: Yes     Alcohol/week: 3.0 standard drinks of alcohol     Types: 3 Cans of beer per week     Comment: maybe 2-3  beers weekly    Drug use: No

## 2025-06-09 NOTE — ASSESSMENT & PLAN NOTE
Patient's blood pressure range in the last 24 hours was: BP  Min: 107/59  Max: 156/85.The patient's inpatient anti-hypertensive regimen is listed below:  Current Antihypertensives  furosemide tablet 20 mg, Daily, Oral  losartan tablet 100 mg, Daily, Oral    Plan  - BP is controlled, no changes needed to their regimen

## 2025-06-09 NOTE — H&P
[facility] Dept  [facility] Medicine  History & Physical    Patient Name: Jordin Allen Jr.  MRN: [id number]  Patient Class: IP- Inpatient  Admission Date: 6/9/2025  Attending Physician: Kenny Brush MD   Primary Care Provider: Sierra Landry MD         Patient information was obtained from patient, past medical records, and ER records.     Subjective:     Principal Problem:Pneumonia of right lung due to infectious organism    Chief Complaint:   Chief Complaint   Patient presents with    Shortness of Breath     Pt arrives via EMS from LTAC facility c/o SOB and elevated trop        HPI: Jordin Allen Jr. Is a 79 y.o.M with pmhx of COPD on chronic 4L NC, GERD, HTN, SHOLA, CAD, NAFLD, dyslipidemia, L lung cancer s/p chemo and radiation c/b radiation necrosis, off immunotherapy since 12/24 metastasis to brain s/p XRT, anxiety, who [place] [facility] [place] with acute onset need for increased oxygen associated with chest pain beginning last night. Patient has not felt this chest pain since the episode. Currently no chest pain. Does endorse SOB and cough. Reports he is not coughing up anything because he feels like it is stuck in his chest. Denies fever, chills, chest pain, palpitations,  abdominal pain, n/v/d, dysuria, headaches, or any other symptoms at this time.     In ED: AF, VSS on 6-8L HFNC. CBC with leukocytosis. Hgb 11.9. CMP notable for Na 135, mild elevation in ALT. Phos 2.4. BNP 62. HS trop 12. LA 0.9. covid/flu negative. ABG with pH 7.499, pCO2 56.3, pO2 79, HCO3 43.8. Blood cultures pending. CTA chest with no evidence of acute PE, mild ill-defined airspace opacities in the dependent portions of the right upper and middle lobes when compared to 05/22/2025, nonspecific.  This could be due to atelectasis, aspiration and/or pneumonia 5 mm left upper lobe nodule, unchanged from 05/22/2025 but new when compared to 03/05/2025.  A follow-up chest CT in 1 year could be considered if the patient is  at increased risk of developing lung cancer, such as from a smoking history. S/p duoneb, dexamethasone 10mg inj, vanc and zosyn in ED. Admitted to  for further evaluation.       Past Medical History:   Diagnosis Date    Benign neoplasm of colon 10/01/2013    Bronchitis chronic     CAD (coronary artery disease) 10/31/2013    Cataract 2008    COPD (chronic obstructive pulmonary disease)     Emphysema of lung     Encounter for preventive health examination 03/21/2018    GERD (gastroesophageal reflux disease)     Glaucoma 2010    Hearing loss 2015    Heart attack 2013    Hx of colonic polyps     Hypertension     NAFLD (nonalcoholic fatty liver disease) 03/27/2017    SHOLA on CPAP     RLS (restless legs syndrome)     Screen for colon cancer 08/30/2013       Past Surgical History:   Procedure Laterality Date    bilateral foot surgery  1993    CARDIAC CATHETERIZATION  2013    x1    CATARACT EXTRACTION, BILATERAL      COLON SURGERY  2013    Ace Mott MD    COLONOSCOPY N/A 03/21/2018    Procedure: COLONOSCOPY;  Surgeon: Terry Mott MD;  [facility]  Service: Endoscopy;  Laterality: N/A;    COLONOSCOPY N/A 04/12/2019    Procedure: COLONOSCOPY;  Surgeon: John Mantilla MD;  [facility]  Service: Endoscopy;  Laterality: N/A;    COLONOSCOPY N/A 05/31/2022    Procedure: COLONOSCOPY;  Surgeon: MEDINA Farris MD;  [facility]  Service: Endoscopy;  Laterality: N/A;  fully vacc-inst portal-tb    ENDOBRONCHIAL ULTRASOUND Bilateral 04/30/2024    Procedure: ENDOBRONCHIAL ULTRASOUND (EBUS);  Surgeon: Naveed Aguero MD;  Location[facility] OR;  Service: Pulmonary;  Laterality: Bilateral;    EYE SURGERY  2011    scrotal abscess removal      TYMPANOSTOMY TUBE PLACEMENT  2017       Review of patient's allergies indicates:   Allergen Reactions    Brilinta [ticagrelor] Itching    Lisinopril      Other reaction(s): cough    Metoprolol succinate Rash       Current  Facility-Administered Medications on File Prior to Encounter   Medication    [ - Suspended Admission] acetaminophen tablet 650 mg    [ - Suspended Admission] albuterol nebulizer solution 0.6667 mg    [ - Suspended Admission] amitriptyline tablet 25 mg    [ - Suspended Admission] ammonium lactate 12 % lotion    [ - Suspended Admission] aspirin EC tablet 81 mg    [ - Suspended Admission] atorvastatin tablet 80 mg    [ - Suspended Admission] calcium carbonate 200 mg calcium (500 mg) chewable tablet 500 mg    dextrose 50% injection 12.5 g    dextrose 50% injection 25 g    [ - Suspended Admission] diltiaZEM tablet 120 mg    [ - Suspended Admission] enoxaparin injection 40 mg    [ - Suspended Admission] fluticasone propionate 50 mcg/actuation nasal spray 100 mcg    [ - Suspended Admission] furosemide tablet 40 mg    [ - Suspended Admission] guaiFENesin 12 hr tablet 1,200 mg    [ - Suspended Admission] hydrOXYzine HCL tablet 25 mg    insulin regular injection 0-8 Units    [ - Suspended Admission] levalbuterol nebulizer solution 1.2495 mg    [ - Suspended Admission] levETIRAcetam tablet 500 mg    [ - Suspended Admission] melatonin tablet 6 mg    [ - Suspended Admission] methocarbamoL tablet 500 mg    [ - Suspended Admission] mirtazapine tablet 15 mg    [ - Suspended Admission] mupirocin 2 % ointment    [ - Suspended Admission] naloxone 0.4 mg/mL injection 0.02 mg    [ - Suspended Admission] nitroGLYCERIN SL tablet 0.4 mg    [ - Suspended Admission] ondansetron disintegrating tablet 4 mg    [ - Suspended Admission] oxyCODONE-acetaminophen 5-325 mg per tablet 1 tablet    [ - Suspended Admission] pantoprazole EC tablet 40 mg    [ - Suspended Admission] polyethylene glycol packet 17 g    [ - Suspended Admission] roflumilast (DALIRESP) tablet 500 mcg    [ - Suspended Admission] sodium chloride 0.9% flush 10 mL    [ - Suspended Admission] sodium chloride 3% nebulizer solution 4 mL    [ - Suspended Admission] tiotropium bromide  2.5 mcg/actuation inhaler 2 puff     Current Outpatient Medications on File Prior to Encounter   Medication Sig    acetaminophen (TYLENOL) 500 MG tablet Take 500 mg by mouth 2 (two) times daily as needed for Pain.    albuterol (ACCUNEB) 0.63 mg/3 mL Nebu Inhale 3 mLs (0.63 mg total) by nebulization every 6 (six) hours as needed (if symptoms failed to improve with inhaler). Rescue    albuterol (PROVENTIL/VENTOLIN HFA) 90 mcg/actuation inhaler Inhale 2 puffs into the lungs every 4 (four) hours as needed for Wheezing.    amitriptyline (ELAVIL) 25 MG tablet Take 1 tablet (25 mg total) by mouth once daily. (Patient taking differently: Take 25 mg by mouth every evening.)    aspirin (ECOTRIN) 81 MG EC tablet Take 81 mg by mouth every morning.    atorvastatin (LIPITOR) 80 MG tablet Take 1 tablet (80 mg total) by mouth in the morning.    BETA-CAROTENE,A, W-C & E/MIN (OCUVITE ORAL) Take 1 capsule by mouth once daily.     budesonide-glycopyr-formoterol (BREZTRI AEROSPHERE) 160-9-4.8 mcg/actuation HFAA Inhale 2 puffs into the lungs 2 (two) times a day.    dexAMETHasone (DECADRON) 4 MG Tab Take 1 tablet (4 mg total) by mouth 2 (two) times daily. (Patient not taking: Reported on 5/16/2025)    echinacea 400 mg Cap Take 1 capsule by mouth once daily.     fluticasone propionate (FLONASE) 50 mcg/actuation nasal spray Spray 2 sprays (100 mcg total) in Each Nostril once daily.    furosemide (LASIX) 20 MG tablet Take 1 tablet (20 mg total) by mouth once daily.    ibuprofen (ADVIL,MOTRIN) 200 MG tablet Take 200 mg by mouth every 8 (eight) hours as needed for Pain.    latanoprost 0.005 % ophthalmic solution Instill 1 drop into both eyes every night at bedtime    levETIRAcetam (KEPPRA) 500 MG Tab Take 1 tablet (500 mg total) by mouth 2 (two) times daily.    losartan (COZAAR) 100 MG tablet Take 1 tablet (100 mg total) by mouth once daily.    nitroGLYCERIN (NITROSTAT) 0.4 MG SL tablet Place 1 tablet (0.4 mg total) under the tongue every 5  (five) minutes as needed.    omeprazole (PRILOSEC) 20 MG capsule Take 20 mg by mouth once daily.    polyethylene glycol (GLYCOLAX) 17 gram/dose powder Take 17 grams by mouth every day for constipation prevention    roflumilast (DALIRESP) 500 mcg Tab Take 500 mcg by mouth every morning.    sennosides (SENNA ORAL) Take 1 tablet by mouth daily as needed (Constipation).    traZODone (DESYREL) 50 MG tablet Take 1 tablet (50 mg total) by mouth every evening.     Family History       Problem Relation (Age of Onset)    Diabetes Maternal Grandmother    Heart attack Mother    Heart disease Mother    Hypertension Mother    No Known Problems Sister, Daughter          Tobacco Use    Smoking status: Former     Current packs/day: 0.00     Average packs/day: 1 pack/day for 40.0 years (40.0 ttl pk-yrs)     Types: Cigarettes     Start date: 8/15/1972     Quit date: 8/15/2012     Years since quittin.8     Passive exposure: Never    Smokeless tobacco: Never   Substance and Sexual Activity    Alcohol use: Yes     Alcohol/week: 3.0 standard drinks of alcohol     Types: 3 Cans of beer per week     Comment: maybe 2-3  beers weekly    Drug use: No    Sexual activity: Yes     Partners: Female     Review of Systems   Constitutional:  Negative for activity change, chills and fever.   HENT:  Negative for trouble swallowing.    Eyes:  Negative for photophobia and visual disturbance.   Respiratory:  Positive for cough, shortness of breath and wheezing. Negative for chest tightness.    Cardiovascular:  Positive for chest pain (resolved). Negative for palpitations and leg swelling.   Gastrointestinal:  Negative for abdominal pain, constipation, diarrhea, nausea and vomiting.   Genitourinary:  Negative for dysuria, frequency, hematuria and urgency.   Musculoskeletal:  Negative for arthralgias, back pain and gait problem.   Skin:  Negative for color change and rash.   Neurological:  Negative for dizziness, syncope, weakness, light-headedness,  numbness and headaches.   Psychiatric/Behavioral:  Negative for agitation and confusion. The patient is not nervous/anxious.      Objective:     Vital Signs (Most Recent):  Temp: 98.1 °F (36.7 °C) (06/09/25 1300)  Pulse: 93 (06/09/25 1400)  Resp: 16 (06/09/25 1400)  BP: (!) 111/56 (06/09/25 1400)  SpO2: 100 % (06/09/25 1400) Vital Signs (24h Range):  Temp:  [97.3 °F (36.3 °C)-98.1 °F (36.7 °C)] 98.1 °F (36.7 °C)  Pulse:  [] 93  Resp:  [15-41] 16  SpO2:  [85 %-100 %] 100 %  BP: (107-156)/(56-85) 111/56     Weight: 86.2 kg (190 lb)  Body mass index is 28.06 kg/m².     Physical Exam  Vitals and nursing note reviewed.   Constitutional:       General: He is not in acute distress.     Appearance: He is well-developed.   HENT:      Head: Normocephalic and atraumatic.      Mouth/Throat:      Pharynx: No oropharyngeal exudate.   Eyes:      Extraocular Movements: Extraocular movements intact.      Conjunctiva/sclera: Conjunctivae normal.   Cardiovascular:      Rate and Rhythm: Normal rate and regular rhythm.      Heart sounds: Normal heart sounds.   Pulmonary:      Effort: Pulmonary effort is normal. No respiratory distress.      Breath sounds: Wheezing and rhonchi present.      Comments: NC in place  Abdominal:      General: Bowel sounds are normal. There is no distension.      Palpations: Abdomen is soft.      Tenderness: There is no abdominal tenderness.   Musculoskeletal:         General: No tenderness. Normal range of motion.      Cervical back: Normal range of motion and neck supple.   Lymphadenopathy:      Cervical: No cervical adenopathy.   Skin:     General: Skin is warm and dry.      Capillary Refill: Capillary refill takes less than 2 seconds.      Findings: No rash.   Neurological:      Mental Status: He is alert and oriented to person, place, and time.      Cranial Nerves: No cranial nerve deficit.      Sensory: No sensory deficit.      Coordination: Coordination normal.   Psychiatric:         Behavior:  Behavior normal.         Thought Content: Thought content normal.         Judgment: Judgment normal.                Significant Labs: All pertinent labs within the past 24 hours have been reviewed.    ABGs:   Recent Labs   Lab 06/09/25  0720   PH 7.499*   PCO2 56.3*   HCO3 43.8*   POCSATURATED 96   BE 21*   PO2 79*     Bilirubin:   Recent Labs   Lab 06/03/25  0213 06/04/25  0344 06/06/25  0526 06/09/25  0553 06/09/25  0905   BILITOT 0.6 0.5 0.9 0.8 0.7     BMP:   Recent Labs   Lab 06/09/25  0553 06/09/25  0905   GLU 98 109   * 135*   K 3.6 3.5   CL 87* 88*   CO2 32* 34*   BUN 25* 26*   CREATININE 0.9 0.9   CALCIUM 9.0 9.0   MG 1.9  --      CBC:   Recent Labs   Lab 06/09/25  0553 06/09/25  0905   WBC 14.42* 15.55*   HGB 11.9* 11.9*   HCT 36.9* 36.8*   * 129*     CMP:   Recent Labs   Lab 06/09/25  0553 06/09/25  0905   * 135*   K 3.6 3.5   CL 87* 88*   CO2 32* 34*   GLU 98 109   BUN 25* 26*   CREATININE 0.9 0.9   CALCIUM 9.0 9.0   PROT 6.2 6.2   ALBUMIN 2.2* 2.3*   BILITOT 0.8 0.7   ALKPHOS 129 124   AST 37 35   ALT 63* 62*   ANIONGAP 15 13     Cardiac Markers:   Recent Labs   Lab 06/09/25  0905   BNP 62     Lactic Acid:   Recent Labs   Lab 06/09/25  0907   LACTATE 0.9     Magnesium:   Recent Labs   Lab 06/09/25  0553   MG 1.9       POCT Glucose:   Recent Labs   Lab 06/08/25  1825   POCTGLUCOSE 145*       Troponin:   Recent Labs   Lab 06/08/25  1937 06/09/25  0905   TROPONINI 10.800*  --    TROPONINIHS  --  12     TSH:   Recent Labs   Lab 06/01/25  1754   TSH 0.261*       Significant Imaging: I have reviewed all pertinent imaging results/findings within the past 24 hours.  Imaging Results              CTA Chest Non-Coronary (PE Studies) (Final result)  Result time 06/09/25 11:55:32      Final result by Anna Brown MD (06/09/25 11:55:32)                   Impression:      No evidence of acute pulmonary embolism.    New mild ill-defined airspace opacities in the dependent portions of the  right upper and middle lobes when compared to 05/22/2025, nonspecific.  This could be due to atelectasis, aspiration and/or pneumonia.    5 mm left upper lobe nodule, unchanged from 05/22/2025 but new when compared to 03/05/2025.  A follow-up chest CT in 1 year could be considered if the patient is at increased risk of developing lung cancer, such as from a smoking history.    Additional findings as above.    Electronically signed by resident: Madeline Iniguez  Date:    06/09/2025  Time:    10:55    Electronically signed by: Anna Brown  Date:    06/09/2025  Time:    11:55               Narrative:    EXAMINATION:  CTA CHEST NON CORONARY (PE STUDIES)    CLINICAL HISTORY:  Pulmonary embolism (PE) suspected, high prob;    TECHNIQUE:  During intravenous bolus injection of 100 ml of Omnipaque 350 contrast medium and using low dose technique, the chest was surveyed from above the pulmonary apices through the posterior costophrenic angles data was reconstructed for multiplanar images in axial, sagittal and coronal planes and for maximal intensity projection images in the the axial, sagittal and coronal planes.    COMPARISON:  Multiple priors, most recent CTA 05/22/2025    FINDINGS:  Exam quality: Satisfactory.    Pulmonary embolism: No acute or chronic pulmonary embolism.    Central pulmonary arteries: Normal caliber.    Aorta: Normal caliber.  Mild atherosclerosis.  Aberrant right subclavian artery.    Heart: No right heart strain. Normal size.    Coronary arteries: Multi-vessel calcific atherosclerosis of the coronary arteries.    Pericardium: Normal. No effusion, thickening, or calcification.    Support tubes and lines: None.    Base of neck/thyroid: Normal.    Lymph nodes: No supraclavicular, axillary, internal mammary, mediastinal, or hilar adenopathy.    Esophagus: Normal.    Pleura: No effusion, thickening, or calcification.    Upper abdomen: Accessory splenule.  Fatty atrophy of the pancreas.    Body wall:  Unremarkable    Airways: Normal.    Lungs: Centrilobular and paraseptal emphysema.  Treatment changes of the left hilar region with associated volume loss and architectural distortion, stable when compared to prior imaging.  Right bandlike scarring versus atelectasis.  Left lower lobe calcified granuloma.  Ill-defined airspace opacities present in the dependent portions of the right upper and middle lobes are new when compared to 05/22/2025.  A 4 mm nodule in the left upper lobe (series 3, image 257) is unchanged from 05/22/2025 but new when compared to 03/05/2025.    Bones: Degenerative changes of the spine.  Height loss of the superioror endplate of L1, unchanged from 02/20/2025.  Unchanged sclerotic focus in the posterior elements of T9.                                       X-Ray Chest AP Portable (Final result)  Result time 06/09/25 09:29:54      Final result by Prince Ovalle MD (06/09/25 09:29:54)                   Impression:      See above      Electronically signed by: Prince Ovalle MD  Date:    06/09/2025  Time:    09:29               Narrative:    EXAMINATION:  XR CHEST AP PORTABLE    CLINICAL HISTORY:  sob;    TECHNIQUE:  Single frontal view of the chest was performed.    COMPARISON:  06/01/25    FINDINGS:  Cardiac size is normal.  Lungs are clear with no significant infiltrate or vascular congestion.  No pleural fluid is seen.                                      Assessment/Plan:     Assessment & Plan  Pneumonia of right lung due to infectious organism  Acute on chronic respiratory failure with hypoxia  Patient has a diagnosis of pneumonia. The cause of the pneumonia is unknown at this time. The pneumonia is stable. The patient has the following signs/symptoms of pneumonia: persistent hypoxia , cough, shortness of breath, and chest pain. The patient does have a current oxygen requirement and the patient does have a home oxygen requirement. I have reviewed the pertinent imaging. The following  cultures have been collected: Blood cultures and Sputum culture The culture results are listed below.     Current antimicrobial regimen consists of the antibiotics listed below. Will monitor patient closely and continue current treatment plan unchanged.    Antibiotics (From admission, onward)      Start     Stop Route Frequency Ordered    06/09/25 1215  vancomycin 1750 mg in 0.9% sodium chloride 500 mL IVPB  (ED Adult Sepsis Treatment)         -- IV Once 06/09/25 1207            Microbiology Results (last 7 days)       Procedure Component Value Units Date/Time    Culture, Respiratory with Gram Stain [[id number]]     Order Status: Sent Specimen: Respiratory     Blood culture #1 **CANNOT BE ORDERED STAT** [[id number]] Collected: 06/09/25 1226    Order Status: Sent Specimen: Blood from Wrist, Left     Blood culture #2 **CANNOT BE ORDERED STAT** [[id number]] Collected: 06/09/25 1226    Order Status: Sent Specimen: Blood from Peripheral, Antecubital, Right     Influenza A & B by Molecular [[id number]]  (Normal) Collected: 06/09/25 0910    Order Status: Completed Specimen: Nasal Swab Updated: 06/09/25 0955     INFLUENZA A MOLECULAR Negative     INFLUENZA B MOLECULAR  Negative          - 2/4 SIRS criteria: WBC/RR  - blood cx pending  - respiratory cx pending  - HS trop 12, BNP 62  - CTA chest with: mild ill-defined airspace opacities in the dependent portions of the right upper and middle lobes when compared to 05/22/2025, nonspecific.  This could be due to atelectasis, aspiration and/or pneumonia   - duonebs q6hr  - guaifenesin, Tessalon Perles prn cough  - s/p vanc + zosyn in ED, will continue for now to cover for potential HAP  - SLP consulted for evaluation    GERD (gastroesophageal reflux disease)  - continue hospital formulary alternative PPI    HTN (hypertension), benign  Patient's blood pressure range in the last 24 hours was: BP  Min: 107/59  Max: 156/85.The patient's inpatient anti-hypertensive regimen is listed  below:  Current Antihypertensives  furosemide tablet 20 mg, Daily, Oral  losartan tablet 100 mg, Daily, Oral    Plan  - BP is controlled, no changes needed to their regimen    SHOLA (obstructive sleep apnea)  - CPAP qhs    CAD (coronary artery disease)  Patient with known CAD, which is controlled Will continue ASA and Statin and monitor for S/Sx of angina/ACS. Continue to monitor on telemetry.   COPD with acute exacerbation  Patient's COPD is with exacerbation noted by continued dyspnea and worsening of baseline hypoxia currently.  Patient is currently on COPD Pathway. Continue scheduled inhalers Steroids, Antibiotics, and Supplemental oxygen and monitor respiratory status closely.   NAFLD (nonalcoholic fatty liver disease)  - history noted    Dyslipidemia  - continue home statin    Adenocarcinoma, lung, left  Metastasis to brain  - history noted      VTE Risk Mitigation (From admission, onward)      None                            Guera Messina PA-C  Department of Hospital Medicine  [facility] - Emergency Dept

## 2025-06-09 NOTE — CARE UPDATE
Patient remains on CPAP HOME UNIT with 4L O2.  Patient tolerated treatment well. No respiratory distress noted. Will continue to monitor.

## 2025-06-09 NOTE — PROGRESS NOTES
[facility] Medicine   Progress Note  Room: 212/212   Patient Name: Jordin Allen Jr.  MRN: [id number]  Admit Date: 6/3/2025   Length of Stay:  LOS: 6 days   Attending Physician: Bernardino Guthrie MD  Nurse Practitioner: Ernestine Palomino NP    Date of Service: 06/09/2025    Principal Problem:    Acute respiratory failure with hypoxia    Brief HPI:     Jordin Allen Jr. is a 79 y.o. 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  He presented to [facility] ED on 01/22/2025 with complaints of shortness of breath and left foot redness.  Admitted to hospital medicine team for left foot cellulitis and acute hypoxic respiratory failure secondary to pneumonia/COPD exacerbation. CTA chest negative for PE. Emphysematous change with superimposed edema. Nodular focus within the anterior aspect of left upper lobe.  Started on nebs, prednisone, and empiric vanc/cefepime and doxycycline.  Respiratory panel positive for parainfluenza virus.  Course further complicated by sinus tach with PACs/18, for which he was started on diltiazem.  Blood cultures positive for staph, suspected to be contaminant.  Repeat blood cultures returned negative.  He will switch to Augmentin and doxy.  Ongoing episodes of SVT, thought to be due to underlying hypoxia.  He was able to be weaned down to high-flow nasal cannula 15 L.  Discharged to [facility] on 06/03/2025 for rehab, wound care, and O2 weaning.     Interval History    Reported some generalized chest pain yesterday evening.  Initial 12 lead EKG showed STEMI, though 12 lead seems to have a good bit of artifact.  Repeat EKG was obtained, which was consistent with his baseline.  He was given nitro with improvement in his symptoms and trop ordered  Tolerated CPAP overnight, but became increasingly hypoxic and  tachycardic this morning  Labs reviewed and notable for troponin increase overnight, up to 10.8  Given his increasing O2 requirements and elevated troponin, will send out to [facility] ED for further evaluation        Subjective:     Review of Systems   Constitutional:  Positive for malaise/fatigue.   Respiratory:  Positive for cough, sputum production and shortness of breath.    Cardiovascular:  Positive for chest pain. Negative for leg swelling.   Gastrointestinal:  Negative for heartburn, nausea and vomiting.   Genitourinary:  Negative for dysuria and urgency.   Musculoskeletal:  Positive for joint pain (R shoulder). Negative for myalgias.   Neurological:  Positive for weakness. Negative for dizziness.   Psychiatric/Behavioral:  Negative for depression. The patient does not have insomnia.          Objective:     Vital Sign Range  Temp:  [97.3 °F (36.3 °C)-98.3 °F (36.8 °C)]   Pulse:  []   Resp:  [15-41]   BP: (107-156)/(55-85)   SpO2:  [85 %-99 %]     Body mass index is 28.08 kg/m².  Oxygen Concentration (%):  [25] 25        Intake/Output Summary (Last 24 hours) at 6/9/2025 0920  Last data filed at 6/9/2025 0823  Gross per 24 hour   Intake 220 ml   Output 550 ml   Net -330 ml       Physical Exam  Constitutional:       Appearance: He is ill-appearing.   HENT:      Head: Normocephalic and atraumatic.      Mouth/Throat:      Mouth: Mucous membranes are moist.      Pharynx: Oropharynx is clear.   Eyes:      Extraocular Movements: Extraocular movements intact.      Pupils: Pupils are equal, round, and reactive to light.   Cardiovascular:      Rate and Rhythm: Normal rate. Rhythm irregular.   Pulmonary:      Breath sounds: Wheezing and rhonchi present.   Abdominal:      General: Bowel sounds are normal.      Palpations: Abdomen is soft.   Musculoskeletal:      Right lower leg: Edema present.      Left lower leg: Edema present.   Skin:     General: Skin is warm and dry.   Neurological:      Mental Status: He is alert and  oriented to person, place, and time. Mental status is at baseline.   Psychiatric:         Mood and Affect: Affect is flat.         Wounds       Wound 05/19/25 0233 Moisture associated dermatitis Left dorsal Foot (Active)   05/19/25 0233 Foot   Present on Original Admission: Y   Primary Wound Type: Moisture ass   Side: Left   Orientation: dorsal        Wound 05/19/25 0231 Moisture associated dermatitis Left lateral Foot (Active)   05/19/25 0231 Foot   Present on Original Admission: Y   Primary Wound Type: Moisture ass   Side: Left   Orientation: lateral        Wound 05/19/25 0231 Pressure Injury Right anterior Ankle (Active)   05/19/25 0231 Ankle   Present on Original Admission: Y   Primary Wound Type: Pressure inj   Side: Right   Orientation: anterior        Wound 05/19/25 0233 Moisture associated dermatitis Left anterior Foot (Active)   05/19/25 0233 Foot   Present on Original Admission:    Primary Wound Type: Moisture ass   Side: Left   Orientation: anterior        Wound 06/04/25 1030 Moisture associated dermatitis Right lateral Abdomen (Active)   06/04/25 1030 Abdomen   Present on Original Admission: Y   Primary Wound Type: Moisture ass   Side: Right   Orientation: lateral        Wound 06/04/25 1030 Moisture associated dermatitis Left lateral Abdomen (Active)   06/04/25 1030 Abdomen   Present on Original Admission: Y   Primary Wound Type: Moisture ass   Side: Left   Orientation: lateral        Wound 06/04/25 1030 Moisture associated dermatitis Right Groin (Active)   06/04/25 1030 Groin   Present on Original Admission: Y   Primary Wound Type: Moisture ass   Side: Right        Wound 06/04/25 1030 Moisture associated dermatitis Left Groin (Active)   06/04/25 1030 Groin   Present on Original Admission: Y   Primary Wound Type: Moisture ass   Side: Left        Wound 06/04/25 1030 Moisture associated dermatitis Sacral spine (Active)   06/04/25 1030 Sacral spine   Present on Original Admission: Y   Primary Wound Type:  Moisture ass         Wounds consistently followed by [facility] NP Sheree Tao     Labs:  Recent Labs   Lab 06/04/25  0851 06/06/25  0526 06/09/25  0553   WBC 12.92* 12.15 14.42*   HGB 12.9* 12.1* 11.9*   HCT 41.2 37.8* 36.9*   * 94* 131*     Recent Labs   Lab 06/04/25  0344 06/06/25  0526 06/09/25  0553    138 134*   K 4.0 3.6 3.6    97 87*   CO2 28 32* 32*   BUN 39* 27* 25*   CREATININE 1.0 1.0 0.9   * 106 98   CALCIUM 8.4* 8.8 9.0   MG 1.8 1.8 1.9   PHOS 2.6* 2.1* 2.4*     Recent Labs   Lab 06/04/25  0344 06/06/25  0526 06/09/25  0553   ALKPHOS 99 84 129   ALT 53* 40 63*   AST 25 19 37   ALBUMIN 2.4* 2.3* 2.2*   PROT 5.2* 5.5* 6.2   BILITOT 0.5 0.9 0.8       Recent Labs     06/08/25  1825   POCTGLUCOSE 145*       Meds Scheduled:   [ - Suspended Admission] amitriptyline  25 mg Oral QHS    [ - Suspended Admission] ammonium lactate   Topical (Top) Daily    [ - Suspended Admission] aspirin  81 mg Oral QAM    [ - Suspended Admission] atorvastatin  80 mg Oral Daily    [ - Suspended Admission] diltiaZEM  120 mg Oral Q8H    [ - Suspended Admission] enoxparin  40 mg Subcutaneous Daily    [ - Suspended Admission] fluticasone propionate  2 spray Each Nostril Daily    [ - Suspended Admission] furosemide  40 mg Oral Daily    [ - Suspended Admission] guaiFENesin  1,200 mg Oral BID    [ - Suspended Admission] levalbuterol  1.2495 mg Nebulization Q4H    [ - Suspended Admission] levETIRAcetam  500 mg Oral BID    [ - Suspended Admission] mirtazapine  15 mg Oral QHS    [ - Suspended Admission] mupirocin   Nasal BID    [ - Suspended Admission] pantoprazole  40 mg Oral Daily    [ - Suspended Admission] roflumilast  500 mcg Oral Daily    [ - Suspended Admission] sodium chloride 3%  4 mL Nebulization BID    [ - Suspended Admission] tiotropium bromide  2 puff Inhalation Daily       Current Inpatient Problem List:  Active Hospital Problems    Diagnosis  POA    *Acute respiratory failure with hypoxia  [J96.01]  Yes    Parainfluenza virus infection [B34.8]  Yes    Pneumonia [J18.9]  Yes    Cellulitis [L03.90]  Yes    TIA (transient ischemic attack) [G45.9]  Yes     ASA and statin      Metastasis to brain [C79.31]  Yes    Adenocarcinoma, lung, 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       CAD (coronary artery disease) [I25.10]  Yes     Chronic     -LHC (10/31/13) for stemi: pLAD 100%, Cx with Li's, mRCA 40%, LVEF 55%,. LVEDP 15   -Xience 3.0 x 33 mm to pLAD, post dilated with 3.5 NC and 4.0 NC.   -TTE (8/14/13): LVEF 55%, grade I diastolic dysfunction      HTN (hypertension), benign [I10]  Yes    SHOLA (obstructive sleep apnea) [G47.33]  Yes     CPAP at night      GERD (gastroesophageal reflux disease) [K21.9]  Yes     Continue PPI        Resolved Hospital Problems   No resolved problems to display.         Assessment / Plan:     Acute respiratory failure with hypoxia  COPD with acute exacerbation  Parainfluenza virus infection  Pneumonia   On Symbicort Spiriva and Daliresp at home. Follows up with pulmonology outpatient.   Completed 7 day course of Augmentin and doxycycline on 06/01  Continue Daliresp 500mcg daily  Continue Xopenex p.r.n.   Completed course of prednisone  Continue doxycycline 100 mg b.i.d.  Continue weaning high-flow nasal cannula  Continue furosemide 40 mg daily  Continue guaifenesin 1200 mg b.i.d.  Increasing O2 requirements this morning, on high-flow nasal cannula 40 L/25% FiO2 with O2 sats 94%    Chest pain, new   Reported some generalized chest pain yesterday evening.  Initial 12 lead EKG showed STEMI, though 12 lead seems to have a good bit of artifact.  Repeat EKG was obtained, which was consistent with his baseline.  He was given nitro with improvement in his symptoms and trop ordered  Tolerated CPAP overnight, but became increasingly  hypoxic and tachycardic this morning  Labs reviewed and notable for troponin increase overnight, up to 10.8  Given his increasing O2 requirements and elevated troponin, will send out to [facility] ED for further evaluation     Cellulitis   Wound followed and managed by consistent, contracted [facility] NP  Completed 7 day course of doxy and Augmentin  Will need follow up with Podiatry after discharge     Restless leg syndrome   Continue amitriptyline 25 mg nightly     Adenocarcinoma, lung, left   Metastasis to brain  Completed treatment with chemo/XRT. Brain XRT for lesionsx2. off immunotherapy since 12/24  suspect the LLL area was consolidation of prior RXT changes and not malignancy and is nearly resolved.  CT on presentation with new SANJAY nodule, need op follow up with pulm  Continue prophylactic Keppra 500 mg b.i.d.    Right shoulder pain  Having some shoulder pain to his right shoulder, which seems to be some muscle spasms.    Started Robaxin 500 mg q.i.d. p.r.n. on 6/6    Compression fracture of L1 vertebrae with routine healing   TLSO brace when out of bed  F/u with Dr. Hopkins     Dyslipidemia  TIA  CAD   Continue aspirin   Continue atorvastatin 80 mg daily     Tachycardia   Episodes of SVT while at the hospital   Evaluated by Cardiology   Likely worsened by underlying hypoxia   Supplemental O2   Continue diltiazem 120 mg q.8 hours  Cardiac monitoring     SHOLA   Continue CPAP nightly     GERD  Continue PPI daily     Left lateral foot wound   Right anterior ankle wound   Traumatic left dorsal foot wound   Left anterior foot venous ulcer   Skin tear right distal arm   Pressure injury nose  Wound followed and managed by consistent, contracted [facility] NP    Advance Care Planning, 06/05/2025  This was a voluntary visit and the option to decline counseling was given  Length of discussion:  16 minutes  Life limiting problem:  Respiratory failure, adenocarcinoma  Topics discussed:  Code status  Outcome of discussion:   Patient would like to remain a full code.  In the event that he is not able to make his own decisions, he would like to defer decision making to his daughter  Decision Maker:Jordin Allen     Psychiatric Assessment  Patient Health Questionnaire-9 (PHQ-9)    Date:  06/05/2025  Total Score: 15  Screening is Positive   Diagnosis and Treatment Plan:  Moderate MDD   Continue amitriptyline 25 mg nightly   Increasing mirtazapine to 15 mg nightly, which will hopefully help with his appetite as well       Diet:  Cardiac   GI Ppx:  PPI   DVT Ppx:  Enoxaparin     Lines:  PIV   Drains:  None   Airways:n/a   Wounds:  Multiple    Goals of Care:   Restorative,  Treat infection  Optimize nutrition  Wound healing  Muscle strengthening  Restoration of ADL's  Improve mobility    Anticipated Disposition:     6/21 home with     Follow up appointments:   Future Appointments   Date Time Provider Department Center   6/24/2025  7:15 AM [facility] OI-MRI2 [facility] MRI [facility]   6/24/2025  9:30 AM Steven Campos MD [facility] NEUROSC [place]   6/24/2025  2:00 PM Bienvenido Henson MD [facility] RADONC3 [place]   7/1/2025 10:30 AM [facility] OIC-XRAY [facility] XRAY [facility]   7/1/2025 11:30 AM Bolivar Sr PA [facility] NEUROS8 [facility]   7/8/2025  9:15 AM MEEKS, VISUAL FIELDS [facility] OPHTHAL [facility]   7/8/2025 10:00 AM Cara Looney MD [facility] OPHTHAL [facility]   7/15/2025 10:00 AM Garrett Lloyd DPM [facility] POD [facility] [place]   7/23/2025 10:15 AM CV [facility] ECHO [facility]IA [place]   8/15/2025  9:20 AM Bienvenido Ward MD [facility] PULMON [place]   8/27/2025  9:00 AM Guido Trejo MD [facility] ENT [facility]   9/8/2025 11:10 AM Yan Palmer MD [facility] DERM [facility]         Ernestine Palomino, NP  Hospital Medicine [facility]    I have spent 58 minutes reviewing patient records, examining, and  of the patient/ patient's family with greater than 50% of the time spent with direct patient care and coordination.

## 2025-06-09 NOTE — PLAN OF CARE
Pt appeared in distress upon arrival of shift. EKG obtained; results consistent with baseline EKG. Pt C/O chest pain. Administered medications per order, see MAR. Notable improvement in chest pain, oxygen saturation, and respiratory rate following intervention. Pt unable to tolerate time off CPAP. Currently resting with slight shortness of breath, tolerating CPAP fairly well. Bed in lowest position and wheels locked, side rails raised x3, call light within reach. Continue CPAP support as tolerated. Will continue to monitor.   Problem: Adult Inpatient Plan of Care  Goal: Plan of Care Review  Outcome: Ongoing  Goal: Patient-Specific Goal (Individualized)  Outcome: Ongoing  Goal: Absence of Hospital-Acquired Illness or Injury  Outcome: Ongoing  Goal: Optimal Comfort and Wellbeing  Outcome: Ongoing  Goal: Readiness for Transition of Care  Outcome: Ongoing     Problem: Pneumonia  Goal: Fluid Balance  Outcome: Ongoing  Goal: Resolution of Infection Signs and Symptoms  Outcome: Ongoing  Goal: Effective Oxygenation and Ventilation  Outcome: Ongoing     Problem: Wound  Goal: Optimal Coping  Outcome: Ongoing  Goal: Optimal Functional Ability  Outcome: Ongoing  Goal: Absence of Infection Signs and Symptoms  Outcome: Ongoing  Goal: Improved Oral Intake  Outcome: Ongoing  Goal: Optimal Pain Control and Function  Outcome: Ongoing  Goal: Skin Health and Integrity  Outcome: Ongoing  Goal: Optimal Wound Healing  Outcome: Ongoing     Problem: Skin Injury Risk Increased  Goal: Skin Health and Integrity  Outcome: Ongoing     Problem: Fall Injury Risk  Goal: Absence of Fall and Fall-Related Injury  Outcome: Ongoing     Problem: Breathing Pattern Ineffective  Goal: Effective Breathing Pattern  Outcome: Ongoing     Problem: Airway Clearance Ineffective  Goal: Effective Airway Clearance  Outcome: Ongoing

## 2025-06-09 NOTE — NURSING
Attempted to call relative Jigar Grimaldo of patient change in status however no answer and voicemail box is full.

## 2025-06-10 PROBLEM — I48.92 ATRIAL FLUTTER WITH RAPID VENTRICULAR RESPONSE: Status: ACTIVE | Noted: 2025-06-10

## 2025-06-10 LAB
ABSOLUTE EOSINOPHIL (OHS): 0 K/UL
ABSOLUTE MONOCYTE (OHS): 0.47 K/UL (ref 0.3–1)
ABSOLUTE NEUTROPHIL COUNT (OHS): 11.08 K/UL (ref 1.8–7.7)
ALBUMIN SERPL BCP-MCNC: 2.2 G/DL (ref 3.5–5.2)
ALP SERPL-CCNC: 143 UNIT/L (ref 40–150)
ALT SERPL W/O P-5'-P-CCNC: 125 UNIT/L (ref 10–44)
ANION GAP (OHS): 13 MMOL/L (ref 8–16)
AST SERPL-CCNC: 136 UNIT/L (ref 11–45)
BASOPHILS # BLD AUTO: 0.04 K/UL
BASOPHILS NFR BLD AUTO: 0.3 %
BILIRUB SERPL-MCNC: 0.6 MG/DL (ref 0.1–1)
BUN SERPL-MCNC: 34 MG/DL (ref 8–23)
CALCIUM SERPL-MCNC: 9 MG/DL (ref 8.7–10.5)
CHLORIDE SERPL-SCNC: 92 MMOL/L (ref 95–110)
CO2 SERPL-SCNC: 29 MMOL/L (ref 23–29)
CREAT SERPL-MCNC: 0.9 MG/DL (ref 0.5–1.4)
ERYTHROCYTE [DISTWIDTH] IN BLOOD BY AUTOMATED COUNT: 15.9 % (ref 11.5–14.5)
GFR SERPLBLD CREATININE-BSD FMLA CKD-EPI: >60 ML/MIN/1.73/M2
GLUCOSE SERPL-MCNC: 150 MG/DL (ref 70–110)
HCT VFR BLD AUTO: 36.6 % (ref 40–54)
HGB BLD-MCNC: 11.6 GM/DL (ref 14–18)
IMM GRANULOCYTES # BLD AUTO: 0.31 K/UL (ref 0–0.04)
IMM GRANULOCYTES NFR BLD AUTO: 2.5 % (ref 0–0.5)
LYMPHOCYTES # BLD AUTO: 0.3 K/UL (ref 1–4.8)
MAGNESIUM SERPL-MCNC: 2.1 MG/DL (ref 1.6–2.6)
MCH RBC QN AUTO: 28.4 PG (ref 27–31)
MCHC RBC AUTO-ENTMCNC: 31.7 G/DL (ref 32–36)
MCV RBC AUTO: 90 FL (ref 82–98)
NUCLEATED RBC (/100WBC) (OHS): 0 /100 WBC
OHS QRS DURATION: 82 MS
OHS QTC CALCULATION: 504 MS
PHOSPHATE SERPL-MCNC: 2.6 MG/DL (ref 2.7–4.5)
PLATELET # BLD AUTO: 130 K/UL (ref 150–450)
PMV BLD AUTO: 10.9 FL (ref 9.2–12.9)
POTASSIUM SERPL-SCNC: 4 MMOL/L (ref 3.5–5.1)
PROT SERPL-MCNC: 6.2 GM/DL (ref 6–8.4)
RBC # BLD AUTO: 4.08 M/UL (ref 4.6–6.2)
RELATIVE EOSINOPHIL (OHS): 0 %
RELATIVE LYMPHOCYTE (OHS): 2.5 % (ref 18–48)
RELATIVE MONOCYTE (OHS): 3.9 % (ref 4–15)
RELATIVE NEUTROPHIL (OHS): 90.8 % (ref 38–73)
SODIUM SERPL-SCNC: 134 MMOL/L (ref 136–145)
VANCOMYCIN SERPL-MCNC: 17.2 UG/ML (ref ?–80)
WBC # BLD AUTO: 12.2 K/UL (ref 3.9–12.7)

## 2025-06-10 PROCEDURE — 25000003 PHARM REV CODE 250

## 2025-06-10 PROCEDURE — 8E0ZXY6 ISOLATION: ICD-10-PCS | Performed by: INTERNAL MEDICINE

## 2025-06-10 PROCEDURE — 27000207 HC ISOLATION

## 2025-06-10 PROCEDURE — 92610 EVALUATE SWALLOWING FUNCTION: CPT

## 2025-06-10 PROCEDURE — 25000003 PHARM REV CODE 250: Performed by: INTERNAL MEDICINE

## 2025-06-10 PROCEDURE — 94761 N-INVAS EAR/PLS OXIMETRY MLT: CPT

## 2025-06-10 PROCEDURE — 94660 CPAP INITIATION&MGMT: CPT

## 2025-06-10 PROCEDURE — 25500020 PHARM REV CODE 255: Performed by: INTERNAL MEDICINE

## 2025-06-10 PROCEDURE — 93005 ELECTROCARDIOGRAM TRACING: CPT

## 2025-06-10 PROCEDURE — 27100171 HC OXYGEN HIGH FLOW UP TO 24 HOURS

## 2025-06-10 PROCEDURE — 93010 ELECTROCARDIOGRAM REPORT: CPT | Mod: ,,, | Performed by: INTERNAL MEDICINE

## 2025-06-10 PROCEDURE — 83735 ASSAY OF MAGNESIUM: CPT

## 2025-06-10 PROCEDURE — 63600175 PHARM REV CODE 636 W HCPCS

## 2025-06-10 PROCEDURE — 25000242 PHARM REV CODE 250 ALT 637 W/ HCPCS

## 2025-06-10 PROCEDURE — 84100 ASSAY OF PHOSPHORUS: CPT

## 2025-06-10 PROCEDURE — 99223 1ST HOSP IP/OBS HIGH 75: CPT | Mod: ,,, | Performed by: INTERNAL MEDICINE

## 2025-06-10 PROCEDURE — 97535 SELF CARE MNGMENT TRAINING: CPT

## 2025-06-10 PROCEDURE — 11000001 HC ACUTE MED/SURG PRIVATE ROOM

## 2025-06-10 PROCEDURE — 99900035 HC TECH TIME PER 15 MIN (STAT)

## 2025-06-10 PROCEDURE — 18500000 HC LEAVE OF ABSENCE HOSPITAL SERVICES

## 2025-06-10 PROCEDURE — 25000003 PHARM REV CODE 250: Performed by: HOSPITALIST

## 2025-06-10 PROCEDURE — 63600175 PHARM REV CODE 636 W HCPCS: Performed by: INTERNAL MEDICINE

## 2025-06-10 PROCEDURE — 5A09357 ASSISTANCE WITH RESPIRATORY VENTILATION, LESS THAN 24 CONSECUTIVE HOURS, CONTINUOUS POSITIVE AIRWAY PRESSURE: ICD-10-PCS | Performed by: INTERNAL MEDICINE

## 2025-06-10 PROCEDURE — 36415 COLL VENOUS BLD VENIPUNCTURE: CPT

## 2025-06-10 PROCEDURE — 94640 AIRWAY INHALATION TREATMENT: CPT

## 2025-06-10 PROCEDURE — 85025 COMPLETE CBC W/AUTO DIFF WBC: CPT

## 2025-06-10 PROCEDURE — 80202 ASSAY OF VANCOMYCIN: CPT | Performed by: INTERNAL MEDICINE

## 2025-06-10 PROCEDURE — 80053 COMPREHEN METABOLIC PANEL: CPT

## 2025-06-10 RX ORDER — DILTIAZEM HYDROCHLORIDE 30 MG/1
60 TABLET, FILM COATED ORAL
Status: DISCONTINUED | OUTPATIENT
Start: 2025-06-10 | End: 2025-06-11

## 2025-06-10 RX ORDER — ENOXAPARIN SODIUM 100 MG/ML
1 INJECTION SUBCUTANEOUS EVERY 12 HOURS
Status: DISCONTINUED | OUTPATIENT
Start: 2025-06-11 | End: 2025-06-11

## 2025-06-10 RX ORDER — DILTIAZEM HYDROCHLORIDE 5 MG/ML
20 INJECTION INTRAVENOUS ONCE
Status: COMPLETED | OUTPATIENT
Start: 2025-06-10 | End: 2025-06-10

## 2025-06-10 RX ORDER — ENOXAPARIN SODIUM 100 MG/ML
50 INJECTION SUBCUTANEOUS ONCE
Status: COMPLETED | OUTPATIENT
Start: 2025-06-10 | End: 2025-06-10

## 2025-06-10 RX ORDER — DILTIAZEM HYDROCHLORIDE 30 MG/1
30 TABLET, FILM COATED ORAL
Status: DISCONTINUED | OUTPATIENT
Start: 2025-06-10 | End: 2025-06-10

## 2025-06-10 RX ORDER — ENOXAPARIN SODIUM 100 MG/ML
40 INJECTION SUBCUTANEOUS DAILY
Status: DISCONTINUED | OUTPATIENT
Start: 2025-06-10 | End: 2025-06-10

## 2025-06-10 RX ADMIN — DILTIAZEM HYDROCHLORIDE 10 MG: 5 INJECTION INTRAVENOUS at 08:06

## 2025-06-10 RX ADMIN — TRAZODONE HYDROCHLORIDE 50 MG: 50 TABLET ORAL at 08:06

## 2025-06-10 RX ADMIN — DILTIAZEM HYDROCHLORIDE 20 MG: 5 INJECTION INTRAVENOUS at 05:06

## 2025-06-10 RX ADMIN — GUAIFENESIN 600 MG: 600 TABLET, EXTENDED RELEASE ORAL at 08:06

## 2025-06-10 RX ADMIN — LOSARTAN POTASSIUM 100 MG: 50 TABLET, FILM COATED ORAL at 08:06

## 2025-06-10 RX ADMIN — ROFLUMILAST 500 MCG: 500 TABLET ORAL at 06:06

## 2025-06-10 RX ADMIN — IPRATROPIUM BROMIDE AND ALBUTEROL SULFATE 3 ML: 2.5; .5 SOLUTION RESPIRATORY (INHALATION) at 07:06

## 2025-06-10 RX ADMIN — AMITRIPTYLINE HYDROCHLORIDE 25 MG: 25 TABLET, FILM COATED ORAL at 08:06

## 2025-06-10 RX ADMIN — IOHEXOL 75 ML: 350 INJECTION, SOLUTION INTRAVENOUS at 07:06

## 2025-06-10 RX ADMIN — ENOXAPARIN SODIUM 50 MG: 60 INJECTION SUBCUTANEOUS at 05:06

## 2025-06-10 RX ADMIN — VANCOMYCIN HYDROCHLORIDE 1000 MG: 1 INJECTION, POWDER, LYOPHILIZED, FOR SOLUTION INTRAVENOUS at 08:06

## 2025-06-10 RX ADMIN — IPRATROPIUM BROMIDE AND ALBUTEROL SULFATE 3 ML: 2.5; .5 SOLUTION RESPIRATORY (INHALATION) at 08:06

## 2025-06-10 RX ADMIN — PANTOPRAZOLE SODIUM 40 MG: 40 TABLET, DELAYED RELEASE ORAL at 08:06

## 2025-06-10 RX ADMIN — VANCOMYCIN HYDROCHLORIDE 1000 MG: 1 INJECTION, POWDER, LYOPHILIZED, FOR SOLUTION INTRAVENOUS at 09:06

## 2025-06-10 RX ADMIN — PIPERACILLIN SODIUM AND TAZOBACTAM SODIUM 4.5 G: 4; .5 INJECTION, POWDER, FOR SOLUTION INTRAVENOUS at 02:06

## 2025-06-10 RX ADMIN — POLYETHYLENE GLYCOL 3350 17 G: 17 POWDER, FOR SOLUTION ORAL at 08:06

## 2025-06-10 RX ADMIN — SODIUM CHLORIDE 500 ML: 9 INJECTION, SOLUTION INTRAVENOUS at 10:06

## 2025-06-10 RX ADMIN — POTASSIUM & SODIUM PHOSPHATES POWDER PACK 280-160-250 MG 1 PACKET: 280-160-250 PACK at 02:06

## 2025-06-10 RX ADMIN — ENOXAPARIN SODIUM 40 MG: 40 INJECTION SUBCUTANEOUS at 05:06

## 2025-06-10 RX ADMIN — POTASSIUM & SODIUM PHOSPHATES POWDER PACK 280-160-250 MG 1 PACKET: 280-160-250 PACK at 06:06

## 2025-06-10 RX ADMIN — PREDNISONE 50 MG: 50 TABLET ORAL at 08:06

## 2025-06-10 RX ADMIN — PIPERACILLIN SODIUM AND TAZOBACTAM SODIUM 4.5 G: 4; .5 INJECTION, POWDER, FOR SOLUTION INTRAVENOUS at 10:06

## 2025-06-10 RX ADMIN — FLUTICASONE PROPIONATE 100 MCG: 50 SPRAY, METERED NASAL at 10:06

## 2025-06-10 RX ADMIN — ATORVASTATIN CALCIUM 80 MG: 40 TABLET, FILM COATED ORAL at 08:06

## 2025-06-10 RX ADMIN — ASPIRIN 81 MG: 81 TABLET, COATED ORAL at 06:06

## 2025-06-10 RX ADMIN — LEVETIRACETAM 500 MG: 500 TABLET, FILM COATED ORAL at 08:06

## 2025-06-10 RX ADMIN — IPRATROPIUM BROMIDE AND ALBUTEROL SULFATE 3 ML: 2.5; .5 SOLUTION RESPIRATORY (INHALATION) at 12:06

## 2025-06-10 RX ADMIN — PIPERACILLIN SODIUM AND TAZOBACTAM SODIUM 4.5 G: 4; .5 INJECTION, POWDER, FOR SOLUTION INTRAVENOUS at 06:06

## 2025-06-10 NOTE — PLAN OF CARE
Plan of care discussed with patient. Patient AOX4 and VSS. Admit completed; see note. Pt on 8L High flow nasal cannula. Pt sleeps with home CPAP. Pt maintained free from falls/trauma/injury. Pt denies pain and discomfort. All questions and concerns addressed.        Problem: Skin Injury Risk Increased  Goal: Skin Health and Integrity  Outcome: Progressing     Problem: Adult Inpatient Plan of Care  Goal: Plan of Care Review  Outcome: Progressing  Goal: Patient-Specific Goal (Individualized)  Outcome: Progressing  Goal: Absence of Hospital-Acquired Illness or Injury  Outcome: Progressing  Goal: Optimal Comfort and Wellbeing  Outcome: Progressing  Goal: Readiness for Transition of Care  Outcome: Progressing     Problem: COPD (Chronic Obstructive Pulmonary Disease)  Goal: Optimal Chronic Illness Coping  Outcome: Progressing  Goal: Optimal Level of Functional Al[place]  Outcome: Progressing  Goal: Absence of Infection Signs and Symptoms  Outcome: Progressing  Goal: Improved Oral Intake  Outcome: Progressing  Goal: Effective Oxygenation and Ventilation  Outcome: Progressing     Problem: Pneumonia  Goal: Fluid Balance  Outcome: Progressing  Goal: Resolution of Infection Signs and Symptoms  Outcome: Progressing  Goal: Effective Oxygenation and Ventilation  Outcome: Progressing     Problem: Wound  Goal: Optimal Coping  Outcome: Progressing  Goal: Optimal Functional Ability  Outcome: Progressing  Goal: Absence of Infection Signs and Symptoms  Outcome: Progressing  Goal: Improved Oral Intake  Outcome: Progressing  Goal: Optimal Pain Control and Function  Outcome: Progressing  Goal: Skin Health and Integrity  Outcome: Progressing  Goal: Optimal Wound Healing  Outcome: Progressing

## 2025-06-10 NOTE — PROGRESS NOTES
Pharmacokinetic Assessment Follow Up: IV Vancomycin    Vancomycin serum concentration assessment(s):    The random level was drawn correctly and can be used to guide therapy at this time. The measurement is within the desired definitive target range of 15 to 20 mcg/mL.    Vancomycin Regimen Plan:    ZACH resolved - Scr at baseline  Change regimen to Vancomycin 1000 mg IV every 12 hours with next serum trough concentration measured at 1900 prior to dose on 6/11/25.    Drug levels (last 3 results):  Recent Labs   Lab Result Units 06/10/25  0330   Vancomycin Random ug/ml 17.2       Pharmacy will continue to follow and monitor vancomycin.    Please contact pharmacy at extension [phone] for questions regarding this assessment.    Thank you for the consult,   Lashonda Smith       Patient brief summary:  Jordin Allen Jr. is a 79 y.o. male initiated on antimicrobial therapy with IV Vancomycin for treatment of lower respiratory infection.    Drug Allergies:   Review of patient's allergies indicates:   Allergen Reactions    Brilinta [ticagrelor] Itching    Lisinopril      Other reaction(s): cough    Metoprolol succinate Rash       Actual Body Weight:   86.3 kg    Renal Function:   Estimated Creatinine Clearance: 74.8 mL/min (based on SCr of 0.9 mg/dL).,     Dialysis Method (if applicable):  N/A    CBC (last 72 hours):  Recent Labs   Lab Result Units 06/09/25  0553 06/09/25  0905 06/10/25  0330   WBC K/uL 14.42* 15.55* 12.20   HGB gm/dL 11.9* 11.9* 11.6*   HCT % 36.9* 36.8* 36.6*   Platelet Count K/uL 131* 129* 130*   Lymph % % 2.6* 2.6* 2.5*   Mono % % 12.1 11.3 3.9*   Eos % % 0.2 0.1 0.0   Basophil % % 0.3 0.3 0.3       Metabolic Panel (last 72 hours):  Recent Labs   Lab Result Units 06/09/25  0553 06/09/25  0905 06/09/25  1446 06/10/25  0330   Sodium mmol/L 134* 135*  --  134*   Potassium mmol/L 3.6 3.5  --  4.0   Chloride mmol/L 87* 88*  --  92*   CO2 mmol/L 32* 34*  --  29   Glucose mg/dL 98 109  --  150*   Glucose, UA    --   --  Negative  --    BUN mg/dL 25* 26*  --  34*   Creatinine mg/dL 0.9 0.9  --  0.9   Albumin g/dL 2.2* 2.3*  --  2.2*   Bilirubin Total mg/dL 0.8 0.7  --  0.6   ALP unit/L 129 124  --  143   AST unit/L 37 35  --  136*   ALT unit/L 63* 62*  --  125*   Magnesium  mg/dL 1.9  --   --  2.1   Phosphorus Level mg/dL 2.4*  --   --  2.6*       Vancomycin Administrations:  vancomycin given in the last 96 hours                     vancomycin 1750 mg in 0.9% sodium chloride 500 mL IVPB (mg) 1,750 mg New Bag 06/09/25 1423                    Microbiologic Results:  Microbiology Results (last 7 days)       Procedure Component Value Units Date/Time    Blood culture #1 **CANNOT BE ORDERED STAT** [[id number]]  (Normal) Collected: 06/09/25 1226    Order Status: Completed Specimen: Blood from Wrist, Left Updated: 06/09/25 2201     Blood Culture No Growth After 6 Hours    Blood culture #2 **CANNOT BE ORDERED STAT** [[id number]]  (Normal) Collected: 06/09/25 1226    Order Status: Completed Specimen: Blood from Peripheral, Antecubital, Right Updated: 06/09/25 2201     Blood Culture No Growth After 6 Hours    Respiratory Infection Panel (PCR), Nasopharyngeal [[id number]]  (Abnormal) Collected: 06/09/25 1444    Order Status: Completed Specimen: Nasopharyngeal Swab Updated: 06/09/25 1718     Respiratory Infection Panel Source Nasopharyngeal Swab     Adenovirus Not Detected     Coronavirus 229E, Common Cold Virus Not Detected     Coronavirus HKU1, Common Cold Virus Not Detected     Coronavirus NL63, Common Cold Virus Not Detected     Coronavirus OC43, Common Cold Virus Not Detected     SARS-CoV2 (COVID-19) Qualitative PCR Not Detected     Human Metapneumovirus Not Detected     Human Rhinovirus/Enterovirus Not Detected     Influenza A Not Detected     Influenza B Not Detected     Parainfluenza Virus 1 Not Detected     Parainfluenza Virus 2 Not Detected     Parainfluenza Virus 3 Detected     Parainfluenza Virus 4 Not Detected      Respiratory Syncytial Virus Not Detected     Bordetella Parapertussis (EX3614) Not Detected     Bordetella pertussis (ptxP) Not Detected     Chlamydia pneumoniae Not Detected     Mycoplasma pneumoniae Not Detected    Culture, Respiratory with Gram Stain [[id number]]     Order Status: Sent Specimen: Respiratory     Influenza A & B by Molecular [[id number]]  (Normal) Collected: 06/09/25 0910    Order Status: Completed Specimen: Nasal Swab Updated: 06/09/25 0955     INFLUENZA A MOLECULAR Negative     INFLUENZA B MOLECULAR  Negative

## 2025-06-10 NOTE — SUBJECTIVE & OBJECTIVE
Interval History:  Parainfluenza positive.  Reports symptoms have improved.  Down to 6 L.  White cell count within normal limits.  Mild transaminitis.  Lactic acid normal.  Procalcitonin elevated    Review of Systems   Unable to perform ROS: Other     Objective:     Vital Signs (Most Recent):  Temp: 98.2 °F (36.8 °C) (06/10/25 0800)  Pulse: 88 (06/10/25 0800)  Resp: 18 (06/10/25 0800)  BP: 120/79 (06/10/25 0800)  SpO2: 96 % (06/10/25 0800) Vital Signs (24h Range):  Temp:  [97.4 °F (36.3 °C)-98.2 °F (36.8 °C)] 98.2 °F (36.8 °C)  Pulse:  [] 88  Resp:  [14-25] 18  SpO2:  [89 %-100 %] 96 %  BP: ()/(52-79) 120/79     Weight: 86.3 kg (190 lb 4.1 oz)  Body mass index is 28.1 kg/m².    Intake/Output Summary (Last 24 hours) at 6/10/2025 0946  Last data filed at 6/10/2025 0838  Gross per 24 hour   Intake 1328.23 ml   Output --   Net 1328.23 ml         Physical Exam  Constitutional:       General: He is not in acute distress.     Appearance: He is ill-appearing.   HENT:      Head: Normocephalic.      Right Ear: External ear normal.      Left Ear: External ear normal.      Ears:      Comments: Hearing impairment     Nose: Nose normal.      Comments: Nasal cannula  Neck:      Comments: No jvd  Cardiovascular:      Rate and Rhythm: Normal rate.   Pulmonary:      Breath sounds: Wheezing (few) present.   Abdominal:      Palpations: Abdomen is soft.      Tenderness: There is no abdominal tenderness.   Skin:     General: Skin is warm.   Neurological:      General: No focal deficit present.      Mental Status: He is alert and oriented to person, place, and time.   Psychiatric:         Mood and Affect: Mood normal.         Thought Content: Thought content normal.               Significant Labs: All pertinent labs within the past 24 hours have been reviewed.    Significant Imaging: I have reviewed all pertinent imaging results/findings within the past 24 hours.

## 2025-06-10 NOTE — PROGRESS NOTES
[facility] Medicine  Progress Note    Patient Name: Jordin Allen Jr.  MRN: [id number]  Patient Class: IP- Inpatient   Admission Date: 6/9/2025  Length of Stay: 1 days  Attending Physician: Kenny Brush MD  Primary Care Provider: Sierra Landry MD        Subjective     Principal Problem:Pneumonia of right lung due to infectious organism        HPI:  Jordin Allen Jr. Is a 79 y.o.M with pmhx of COPD on chronic 4L NC, GERD, HTN, SHOLA, CAD, NAFLD, dyslipidemia, L lung cancer s/p chemo and radiation c/b radiation necrosis, off immunotherapy since 12/24 metastasis to brain s/p XRT, anxiety, who presents to [facility] ED from LTAC with acute onset need for increased oxygen associated with chest pain beginning last night. Patient has not felt this chest pain since the episode. Currently no chest pain. Does endorse SOB and cough. Reports he is not coughing up anything because he feels like it is stuck in his chest. Denies fever, chills, chest pain, palpitations,  abdominal pain, n/v/d, dysuria, headaches, or any other symptoms at this time.     In ED: AF, VSS on 6-8L HFNC. CBC with leukocytosis. Hgb 11.9. CMP notable for Na 135, mild elevation in ALT. Phos 2.4. BNP 62. HS trop 12. LA 0.9. covid/flu negative. ABG with pH 7.499, pCO2 56.3, pO2 79, HCO3 43.8. Blood cultures pending. CTA chest with no evidence of acute PE, mild ill-defined airspace opacities in the dependent portions of the right upper and middle lobes when compared to 05/22/2025, nonspecific.  This could be due to atelectasis, aspiration and/or pneumonia 5 mm left upper lobe nodule, unchanged from 05/22/2025 but new when compared to 03/05/2025.  A follow-up chest CT in 1 year could be considered if the patient is at increased risk of developing lung cancer, such as from a smoking history. S/p duoneb, dexamethasone 10mg inj, vanc and zosyn in ED. Admitted to  for further evaluation.       Overview/Hospital Course:  Patient  transferred from LTAC for worsening hypoxic respiratory failure.  He was admitted to LTAC for weaning off oxygen.  CT with findings of pneumonia.  Parainfluenza positive.  Antibiotics broadened to vancomycin and Zosyn, sputum cultures ordered, supportive care and treatment for COPD exacerbation.  Patient reports symptoms have improved.    Interval History:  Parainfluenza positive.  Reports symptoms have improved.  Down to 6 L.  White cell count within normal limits.  Mild transaminitis.  Lactic acid normal.  Procalcitonin elevated    Review of Systems   Unable to perform ROS: Other     Objective:     Vital Signs (Most Recent):  Temp: 98.2 °F (36.8 °C) (06/10/25 0800)  Pulse: 88 (06/10/25 0800)  Resp: 18 (06/10/25 0800)  BP: 120/79 (06/10/25 0800)  SpO2: 96 % (06/10/25 0800) Vital Signs (24h Range):  Temp:  [97.4 °F (36.3 °C)-98.2 °F (36.8 °C)] 98.2 °F (36.8 °C)  Pulse:  [] 88  Resp:  [14-25] 18  SpO2:  [89 %-100 %] 96 %  BP: ()/(52-79) 120/79     Weight: 86.3 kg (190 lb 4.1 oz)  Body mass index is 28.1 kg/m².    Intake/Output Summary (Last 24 hours) at 6/10/2025 0946  Last data filed at 6/10/2025 0838  Gross per 24 hour   Intake 1328.23 ml   Output --   Net 1328.23 ml         Physical Exam  Constitutional:       General: He is not in acute distress.     Appearance: He is ill-appearing.   HENT:      Head: Normocephalic.      Right Ear: External ear normal.      Left Ear: External ear normal.      Ears:      Comments: Hearing impairment     Nose: Nose normal.      Comments: Nasal cannula  Neck:      Comments: No jvd  Cardiovascular:      Rate and Rhythm: Normal rate.   Pulmonary:      Breath sounds: Wheezing (few) present.   Abdominal:      Palpations: Abdomen is soft.      Tenderness: There is no abdominal tenderness.   Skin:     General: Skin is warm.   Neurological:      General: No focal deficit present.      Mental Status: He is alert and oriented to person, place, and time.   Psychiatric:          "Mood and Affect: Mood normal.         Thought Content: Thought content normal.               Significant Labs: All pertinent labs within the past 24 hours have been reviewed.    Significant Imaging: I have reviewed all pertinent imaging results/findings within the past 24 hours.      Assessment & Plan  Pneumonia of right lung due to infectious organism  Acute on chronic respiratory failure with hypoxia  Patient has a diagnosis of pneumonia. The cause of the pneumonia is unknown at this time. The pneumonia is stable. The patient has the following signs/symptoms of pneumonia: persistent hypoxia , cough, shortness of breath, and chest pain. The patient does have a current oxygen requirement and the patient does have a home oxygen requirement. I have reviewed the pertinent imaging. The following cultures have been collected: Blood cultures and Sputum culture The culture results are listed below.     Current antimicrobial regimen consists of the antibiotics listed below. Will monitor patient closely and continue current treatment plan unchanged.    Antibiotics (From admission, onward)      Start     Stop Route Frequency Ordered    06/10/25 0800  vancomycin (VANCOCIN) 1,000 mg in 0.9% NaCl 250 mL IVPB (admixture device)         -- IV Every 12 hours (non-standard times) 06/10/25 0648    06/09/25 1830  piperacillin-tazobactam (ZOSYN) 4.5 g in D5W 100 mL IVPB (MB+)         -- IV Every 8 hours (non-standard times) 06/09/25 1436    06/09/25 1535  vancomycin - pharmacy to dose  (vancomycin IVPB (PEDS and ADULTS))        Placed in "And" Linked Group    -- IV pharmacy to manage frequency 06/09/25 1436            Microbiology Results (last 7 days)       Procedure Component Value Units Date/Time    Blood culture #1 **CANNOT BE ORDERED STAT** [[id number]]  (Normal) Collected: 06/09/25 1226    Order Status: Completed Specimen: Blood from Wrist, Left Updated: 06/09/25 2201     Blood Culture No Growth After 6 Hours    Blood culture #2 " **CANNOT BE ORDERED STAT** [[id number]]  (Normal) Collected: 06/09/25 1226    Order Status: Completed Specimen: Blood from Peripheral, Antecubital, Right Updated: 06/09/25 2201     Blood Culture No Growth After 6 Hours    Respiratory Infection Panel (PCR), Nasopharyngeal [[id number]]  (Abnormal) Collected: 06/09/25 1444    Order Status: Completed Specimen: Nasopharyngeal Swab Updated: 06/09/25 1718     Respiratory Infection Panel Source Nasopharyngeal Swab     Adenovirus Not Detected     Coronavirus 229E, Common Cold Virus Not Detected     Coronavirus HKU1, Common Cold Virus Not Detected     Coronavirus NL63, Common Cold Virus Not Detected     Coronavirus OC43, Common Cold Virus Not Detected     SARS-CoV2 (COVID-19) Qualitative PCR Not Detected     Human Metapneumovirus Not Detected     Human Rhinovirus/Enterovirus Not Detected     Influenza A Not Detected     Influenza B Not Detected     Parainfluenza Virus 1 Not Detected     Parainfluenza Virus 2 Not Detected     Parainfluenza Virus 3 Detected     Parainfluenza Virus 4 Not Detected     Respiratory Syncytial Virus Not Detected     Bordetella Parapertussis (YC0931) Not Detected     Bordetella pertussis (ptxP) Not Detected     Chlamydia pneumoniae Not Detected     Mycoplasma pneumoniae Not Detected    Culture, Respiratory with Gram Stain [[id number]]     Order Status: Sent Specimen: Respiratory     Influenza A & B by Molecular [[id number]]  (Normal) Collected: 06/09/25 0910    Order Status: Completed Specimen: Nasal Swab Updated: 06/09/25 0955     INFLUENZA A MOLECULAR Negative     INFLUENZA B MOLECULAR  Negative          - 2/4 SIRS criteria: WBC/RR  - blood cx pending  - respiratory cx pending  - HS trop 12, BNP 62  - CTA chest with: mild ill-defined airspace opacities in the dependent portions of the right upper and middle lobes when compared to 05/22/2025, nonspecific.  This could be due to atelectasis, aspiration and/or pneumonia   - duonebs q6hr  - guaifenesin,  "Tessalon Perles prn cough  - s/p vanc + zosyn in ED, will continue for now to cover for potential HAP  - SLP consulted for evaluation      6/10  Oxygen requirements improving.  Parainfluenza positive, continue with supportive care.  Follow up with sputum cultures.  Continue with treatment for COPD exacerbation with prednisone and nebs.  Continue broad-spectrum antibiotics vancomycin and Zosyn for now.  Monitor LFTs. F/u slp  Patient has a diagnosis of pneumonia. The cause of the pneumonia is unknown at this time. The pneumonia is stable. The patient has the following signs/symptoms of pneumonia: persistent hypoxia , cough, shortness of breath, and chest pain. The patient does have a current oxygen requirement and the patient does have a home oxygen requirement. I have reviewed the pertinent imaging. The following cultures have been collected: Blood cultures and Sputum culture The culture results are listed below.     Current antimicrobial regimen consists of the antibiotics listed below. Will monitor patient closely and continue current treatment plan unchanged.    Antibiotics (From admission, onward)      Start     Stop Route Frequency Ordered    06/10/25 0800  vancomycin (VANCOCIN) 1,000 mg in 0.9% NaCl 250 mL IVPB (admixture device)         -- IV Every 12 hours (non-standard times) 06/10/25 0648    06/09/25 1830  piperacillin-tazobactam (ZOSYN) 4.5 g in D5W 100 mL IVPB (MB+)         -- IV Every 8 hours (non-standard times) 06/09/25 1436    06/09/25 1535  vancomycin - pharmacy to dose  (vancomycin IVPB (PEDS and ADULTS))        Placed in "And" Linked Group    -- IV pharmacy to manage frequency 06/09/25 1436           Procedure Component Value Units Date/Time    Blood culture #1 **CANNOT BE ORDERED STAT** [[id number]]  (Normal) Collected: 06/09/25 1226    Order Status: Completed Specimen: Blood from Wrist, Left Updated: 06/09/25 2201     Blood Culture No Growth After 6 Hours    Blood culture #2 **CANNOT BE ORDERED " STAT** [[id number]]  (Normal) Collected: 06/09/25 1226    Order Status: Completed Specimen: Blood from Peripheral, Antecubital, Right Updated: 06/09/25 2201     Blood Culture No Growth After 6 Hours    Respiratory Infection Panel (PCR), Nasopharyngeal [[id number]]  (Abnormal) Collected: 06/09/25 1444    Order Status: Completed Specimen: Nasopharyngeal Swab Updated: 06/09/25 1718     Respiratory Infection Panel Source Nasopharyngeal Swab     Adenovirus Not Detected     Coronavirus 229E, Common Cold Virus Not Detected     Coronavirus HKU1, Common Cold Virus Not Detected     Coronavirus NL63, Common Cold Virus Not Detected     Coronavirus OC43, Common Cold Virus Not Detected     SARS-CoV2 (COVID-19) Qualitative PCR Not Detected     Human Metapneumovirus Not Detected     Human Rhinovirus/Enterovirus Not Detected     Influenza A Not Detected     Influenza B Not Detected     Parainfluenza Virus 1 Not Detected     Parainfluenza Virus 2 Not Detected     Parainfluenza Virus 3 Detected     Parainfluenza Virus 4 Not Detected     Respiratory Syncytial Virus Not Detected     Bordetella Parapertussis (BP9988) Not Detected     Bordetella pertussis (ptxP) Not Detected     Chlamydia pneumoniae Not Detected     Mycoplasma pneumoniae Not Detected    Culture, Respiratory with Gram Stain [[id number]]     Order Status: Sent Specimen: Respiratory     Influenza A & B by Molecular [[id number]]  (Normal) Collected: 06/09/25 0910    Order Status: Completed Specimen: Nasal Swab Updated: 06/09/25 0955     INFLUENZA A MOLECULAR Negative     INFLUENZA B MOLECULAR  Negative           Respiratory Infection Panel (PCR), Nasopharyngeal [[id number]]  (Abnormal) Collected: 06/09/25 1444    Order Status: Completed Specimen: Nasopharyngeal Swab Updated: 06/09/25 1718     Respiratory Infection Panel Source Nasopharyngeal Swab     Adenovirus Not Detected     Coronavirus 229E, Common Cold Virus Not Detected     Coronavirus HKU1, Common Cold Virus Not  Detected     Coronavirus NL63, Common Cold Virus Not Detected     Coronavirus OC43, Common Cold Virus Not Detected     SARS-CoV2 (COVID-19) Qualitative PCR Not Detected     Human Metapneumovirus Not Detected     Human Rhinovirus/Enterovirus Not Detected     Influenza A Not Detected     Influenza B Not Detected     Parainfluenza Virus 1 Not Detected     Parainfluenza Virus 2 Not Detected     Parainfluenza Virus 3 Detected     Parainfluenza Virus 4 Not Detected     Respiratory Syncytial Virus Not Detected     Bordetella Parapertussis (PK0494) Not Detected     Bordetella pertussis (ptxP) Not Detected     Chlamydia pneumoniae Not Detected     Mycoplasma pneumoniae Not Detected    Culture, Respiratory with Gram Stain [[id number]]     Order Status: Sent Specimen: Respiratory     Influenza A & B by Molecular [[id number]]  (Normal) Collected: 06/09/25 0910    Order Status: Completed Specimen: Nasal Swab Updated: 06/09/25 0955     INFLUENZA A MOLECULAR Negative     INFLUENZA B MOLECULAR  Negative          - 2/4 SIRS criteria: WBC/RR  - blood cx pending  - respiratory cx pending  - HS trop 12, BNP 62  - CTA chest with: mild ill-defined airspace opacities in the dependent portions of the right upper and middle lobes when compared to 05/22/2025, nonspecific.  This could be due to atelectasis, aspiration and/or pneumonia   - duonebs q6hr  - guaifenesin, Tessalon Perles prn cough  - s/p vanc + zosyn in ED, will continue for now to cover for potential HAP  - SLP consulted for evaluation    GERD (gastroesophageal reflux disease)  - continue hospital formulary alternative PPI    HTN (hypertension), benign  Patient's blood pressure range in the last 24 hours was: BP  Min: 95/69  Max: 129/58.The patient's inpatient anti-hypertensive regimen is listed below:  Current Antihypertensives  losartan tablet 100 mg, Daily, Oral    Plan  - BP is controlled, no changes needed to their regimen    SHOLA (obstructive sleep apnea)  - CPAP qhs    CAD  (coronary artery disease)  Patient with known CAD, which is controlled Will continue ASA and Statin and monitor for S/Sx of angina/ACS. Continue to monitor on telemetry.   COPD with acute exacerbation  Patient's COPD is with exacerbation noted by continued dyspnea and worsening of baseline hypoxia currently.  Patient is currently on COPD Pathway. Continue scheduled inhalers Steroids, Antibiotics, and Supplemental oxygen and monitor respiratory status closely.   NAFLD (nonalcoholic fatty liver disease)  - history noted    Dyslipidemia  - continue home statin    Adenocarcinoma, lung, left  Metastasis to brain  - history noted      Anxiety      VTE Risk Mitigation (From admission, onward)      None            Discharge Planning   MEI: 6/11/2025     Code Status: Full Code   Medical Readiness for Discharge Date:   Discharge Plan A: Long-term acute care facility (LTAC)                        Kenny Brush MD  Department of Hospital Medicine   [facility] - [facility]iology Stepdown

## 2025-06-10 NOTE — NURSING
Patient arrived to the unit, oriented to room and nurse.Telemetry box on and vital signs documented in flow sheet. Identification band on patient. Call bell with in reach; bed is locked in the lowest position. Admit completed; see flowsheet. Plan of care initiated.    Nurses Note -- 4 Eyes    Skin assessed during: Admit      [] No Altered Skin Integrity Present    []Prevention Measures Documented      [x] Yes- Altered Skin Integrity Present or Discovered   [] LDA Added if Not in Epic (Describe Wound)   [] New Altered Skin Integrity was Present on Admit and Documented in LDA   [x] Wound Image Taken    Wound Care Consulted? No    Attending Nurse:  Cara Pastrana RN     Second RN/Staff Member:  SALO Mendoza

## 2025-06-10 NOTE — NURSING
Patient is on O2 by nasal cannula 6L/mt.His hearing aids brought by audiology people, as per them he got some wax in his both ears&its look ??infected,informed doctor about it.Patient is very hard of hearing,hearing aids are at the bedside,as per audiology he can put it after clearing his ears.

## 2025-06-10 NOTE — PLAN OF CARE
Discharge Planning Assessment:    Patient admitted on: 6-9-25  Chart reviewed today  Care plan discussed with ER treatment team   attending Dr Brush    Current Dispo: on going  Return to [facility] when medically ready   Await bed/floor assignment tonight  Case management to follow tomorrow

## 2025-06-10 NOTE — RESPIRATORY THERAPY
RAPID RESPONSE RESPIRATORY THERAPY PROACTIVE ROUNDING NOTE             Time of visit: 09     Code Status: Full Code   : 1945  Bed: 342/342 A:   MRN: [id number]  Time spent at the bedside: < 15 min    SITUATION    Evaluated patient for: HFNC Compliance     BACKGROUND    Patient has a past medical history of Benign neoplasm of colon, Bronchitis chronic, CAD (coronary artery disease), Cataract, COPD (chronic obstructive pulmonary disease), Emphysema of lung, Encounter for preventive health examination, GERD (gastroesophageal reflux disease), Glaucoma, Hearing loss, Heart attack, colonic polyps, Hypertension, NAFLD (nonalcoholic fatty liver disease), SHOLA on CPAP, RLS (restless legs syndrome), and Screen for colon cancer.    24 Hours Vitals Range:  Temp:  [97.3 °F (36.3 °C)-98.2 °F (36.8 °C)]   Pulse:  []   Resp:  [14-23]   BP: ()/(52-79)   SpO2:  [89 %-100 %]     Labs:    Recent Labs     25  0553 25  0905 06/10/25  0330   * 135* 134*   K 3.6 3.5 4.0   CL 87* 88* 92*   CO2 32* 34* 29   BUN 25* 26* 34*   CREATININE 0.9 0.9 0.9   GLU 98 109 150*   PHOS 2.4*  --  2.6*   MG 1.9  --  2.1        Recent Labs     25  0720   PH 7.499*   PCO2 56.3*   PO2 79*   HCO3 43.8*   POCSATURATED 96   BE 21*       ASSESSMENT/INTERVENTIONS    Pt initially on 7L HFNC. O2 weaned to 5L, sat 99%.    Last VS   Temp: 97.3 °F (36.3 °C) (06/10 1132)  Pulse: 85 (06/10 124)  Resp: 16 (06/10 1249)  BP: 108/73 (06/10 113)  SpO2: 98 % (06/10 124)    Level of Consciousness: Level of Consciousness (AVPU): alert  Respiratory Effort: Respiratory Effort: Unlabored Expansion/Accessory Muscle Usage: Expansion/Accessory Muscles/Retractions: no use of accessory muscles, no retractions, expansion symmetric  All Lung Field Breath Sounds: All Lung Fields Breath Sounds: Anterior:, Lateral:, coarse, crackles, coarse, diminished  SANJAY Breath Sounds: coarse, diminished  LLL Breath Sounds: coarse, diminished  RUL Breath Sounds:  diminished  RML Breath Sounds: diminished  RLL Breath Sounds: diminished  O2 Device/Concentration: 5L HFNC.  Was the O2 device able to be weaned? Yes  Ambu at bedside: Yes.    Active Orders   Respiratory Care    Bipap Nighttime and Daytime Nap Use     Frequency: Nighttime and Daytime Nap Use     Number of Occurrences: Until Specified     Order Questions:      Mode BIPAP      FiO2% 40      Inspiratory pressure: 18      Expiratory pressure: 10    Chest physiotherapy Q8H     Frequency: Q8H     Number of Occurrences: Until Specified     Order Questions:      Indications: ATELECTASIS PROPHYLAXIS      Indications: COPIOUS SPUTUM PRODUCTION    CPAP QHS     Frequency: QHS     Number of Occurrences: Until Specified     Order Questions:      Positive Airway Pressure 8    CPAP QHS     Frequency: QHS     Number of Occurrences: Until Specified    Inhalation Treatment Q6H     Frequency: Q6H     Number of Occurrences: Until Specified    Oxygen Continuous     Frequency: Continuous     Number of Occurrences: Until Specified     Order Questions:      Device type: High flow      Device: High Flow Nasal Cannula (6 -15 Liters)      LPM: 6-15      Titrate O2 per Oxygen Titration Protocol: Yes      To maintain SpO2 goal of: >= 88%      Notify MD of: Inability to achieve desired SpO2; Sudden change in patient status and requires 20% increase in FiO2; Patient requires >60% FiO2    Oxygen Continuous     Frequency: Continuous     Number of Occurrences: Until Specified     Order Questions:      Device type: High flow      Device: High Flow Nasal Cannula (6 -15 Liters)      LPM: 6-15      Titrate O2 per Oxygen Titration Protocol: Yes    Pulse Oximetry Q4H     Frequency: Q4H     Number of Occurrences: Until Specified    Pulse Oximetry Q4H     Frequency: Q4H     Number of Occurrences: Until Specified     Order Comments: Q4h with Vitals. Notify MD if < or = 88%         RECOMMENDATIONS    We recommend: RRT Recs: Continue POC per primary  team.      FOLLOW-UP    Please call back the Rapid Response RT, Dav Ortiz, RRT at x [phone] for any questions or concerns.

## 2025-06-10 NOTE — ASSESSMENT & PLAN NOTE
"Patient has a diagnosis of pneumonia. The cause of the pneumonia is unknown at this time. The pneumonia is stable. The patient has the following signs/symptoms of pneumonia: persistent hypoxia , cough, shortness of breath, and chest pain. The patient does have a current oxygen requirement and the patient does have a home oxygen requirement. I have reviewed the pertinent imaging. The following cultures have been collected: Blood cultures and Sputum culture The culture results are listed below.     Current antimicrobial regimen consists of the antibiotics listed below. Will monitor patient closely and continue current treatment plan unchanged.    Antibiotics (From admission, onward)      Start     Stop Route Frequency Ordered    06/10/25 0800  vancomycin (VANCOCIN) 1,000 mg in 0.9% NaCl 250 mL IVPB (admixture device)         -- IV Every 12 hours (non-standard times) 06/10/25 0648    06/09/25 1830  piperacillin-tazobactam (ZOSYN) 4.5 g in D5W 100 mL IVPB (MB+)         -- IV Every 8 hours (non-standard times) 06/09/25 1436    06/09/25 1535  vancomycin - pharmacy to dose  (vancomycin IVPB (PEDS and ADULTS))        Placed in "And" Linked Group    -- IV pharmacy to manage frequency 06/09/25 1436            Microbiology Results (last 7 days)       Procedure Component Value Units Date/Time    Blood culture #1 **CANNOT BE ORDERED STAT** [[id number]]  (Normal) Collected: 06/09/25 1226    Order Status: Completed Specimen: Blood from Wrist, Left Updated: 06/09/25 2201     Blood Culture No Growth After 6 Hours    Blood culture #2 **CANNOT BE ORDERED STAT** [[id number]]  (Normal) Collected: 06/09/25 1226    Order Status: Completed Specimen: Blood from Peripheral, Antecubital, Right Updated: 06/09/25 2201     Blood Culture No Growth After 6 Hours    Respiratory Infection Panel (PCR), Nasopharyngeal [[id number]]  (Abnormal) Collected: 06/09/25 1444    Order Status: Completed Specimen: Nasopharyngeal Swab Updated: 06/09/25 1718     " Respiratory Infection Panel Source Nasopharyngeal Swab     Adenovirus Not Detected     Coronavirus 229E, Common Cold Virus Not Detected     Coronavirus HKU1, Common Cold Virus Not Detected     Coronavirus NL63, Common Cold Virus Not Detected     Coronavirus OC43, Common Cold Virus Not Detected     SARS-CoV2 (COVID-19) Qualitative PCR Not Detected     Human Metapneumovirus Not Detected     Human Rhinovirus/Enterovirus Not Detected     Influenza A Not Detected     Influenza B Not Detected     Parainfluenza Virus 1 Not Detected     Parainfluenza Virus 2 Not Detected     Parainfluenza Virus 3 Detected     Parainfluenza Virus 4 Not Detected     Respiratory Syncytial Virus Not Detected     Bordetella Parapertussis (AA1714) Not Detected     Bordetella pertussis (ptxP) Not Detected     Chlamydia pneumoniae Not Detected     Mycoplasma pneumoniae Not Detected    Culture, Respiratory with Gram Stain [[id number]]     Order Status: Sent Specimen: Respiratory     Influenza A & B by Molecular [[id number]]  (Normal) Collected: 06/09/25 0910    Order Status: Completed Specimen: Nasal Swab Updated: 06/09/25 0955     INFLUENZA A MOLECULAR Negative     INFLUENZA B MOLECULAR  Negative          - 2/4 SIRS criteria: WBC/RR  - blood cx pending  - respiratory cx pending  - HS trop 12, BNP 62  - CTA chest with: mild ill-defined airspace opacities in the dependent portions of the right upper and middle lobes when compared to 05/22/2025, nonspecific.  This could be due to atelectasis, aspiration and/or pneumonia   - duonebs q6hr  - guaifenesin, Tessalon Perles prn cough  - s/p vanc + zosyn in ED, will continue for now to cover for potential HAP  - SLP consulted for evaluation      6/10  Oxygen requirements improving.  Parainfluenza positive, continue with supportive care.  Follow up with sputum cultures.  Continue with treatment for COPD exacerbation with prednisone and nebs.  Continue broad-spectrum antibiotics vancomycin and Zosyn for now.   "Monitor LFTs. F/u slp  Patient has a diagnosis of pneumonia. The cause of the pneumonia is unknown at this time. The pneumonia is stable. The patient has the following signs/symptoms of pneumonia: persistent hypoxia , cough, shortness of breath, and chest pain. The patient does have a current oxygen requirement and the patient does have a home oxygen requirement. I have reviewed the pertinent imaging. The following cultures have been collected: Blood cultures and Sputum culture The culture results are listed below.     Current antimicrobial regimen consists of the antibiotics listed below. Will monitor patient closely and continue current treatment plan unchanged.    Antibiotics (From admission, onward)      Start     Stop Route Frequency Ordered    06/10/25 0800  vancomycin (VANCOCIN) 1,000 mg in 0.9% NaCl 250 mL IVPB (admixture device)         -- IV Every 12 hours (non-standard times) 06/10/25 0648    06/09/25 1830  piperacillin-tazobactam (ZOSYN) 4.5 g in D5W 100 mL IVPB (MB+)         -- IV Every 8 hours (non-standard times) 06/09/25 1436    06/09/25 1535  vancomycin - pharmacy to dose  (vancomycin IVPB (PEDS and ADULTS))        Placed in "And" Linked Group    -- IV pharmacy to manage frequency 06/09/25 1436           Procedure Component Value Units Date/Time    Blood culture #1 **CANNOT BE ORDERED STAT** [[id number]]  (Normal) Collected: 06/09/25 1226    Order Status: Completed Specimen: Blood from Wrist, Left Updated: 06/09/25 2201     Blood Culture No Growth After 6 Hours    Blood culture #2 **CANNOT BE ORDERED STAT** [[id number]]  (Normal) Collected: 06/09/25 1226    Order Status: Completed Specimen: Blood from Peripheral, Antecubital, Right Updated: 06/09/25 2201     Blood Culture No Growth After 6 Hours    Respiratory Infection Panel (PCR), Nasopharyngeal [[id number]]  (Abnormal) Collected: 06/09/25 1444    Order Status: Completed Specimen: Nasopharyngeal Swab Updated: 06/09/25 1718     Respiratory " Infection Panel Source Nasopharyngeal Swab     Adenovirus Not Detected     Coronavirus 229E, Common Cold Virus Not Detected     Coronavirus HKU1, Common Cold Virus Not Detected     Coronavirus NL63, Common Cold Virus Not Detected     Coronavirus OC43, Common Cold Virus Not Detected     SARS-CoV2 (COVID-19) Qualitative PCR Not Detected     Human Metapneumovirus Not Detected     Human Rhinovirus/Enterovirus Not Detected     Influenza A Not Detected     Influenza B Not Detected     Parainfluenza Virus 1 Not Detected     Parainfluenza Virus 2 Not Detected     Parainfluenza Virus 3 Detected     Parainfluenza Virus 4 Not Detected     Respiratory Syncytial Virus Not Detected     Bordetella Parapertussis ([id number]) Not Detected     Bordetella pertussis (ptxP) Not Detected     Chlamydia pneumoniae Not Detected     Mycoplasma pneumoniae Not Detected    Culture, Respiratory with Gram Stain [[id number]]     Order Status: Sent Specimen: Respiratory     Influenza A & B by Molecular [[id number]]  (Normal) Collected: 06/09/25 0910    Order Status: Completed Specimen: Nasal Swab Updated: 06/09/25 0955     INFLUENZA A MOLECULAR Negative     INFLUENZA B MOLECULAR  Negative           Respiratory Infection Panel (PCR), Nasopharyngeal [[id number]]  (Abnormal) Collected: 06/09/25 1444    Order Status: Completed Specimen: Nasopharyngeal Swab Updated: 06/09/25 1718     Respiratory Infection Panel Source Nasopharyngeal Swab     Adenovirus Not Detected     Coronavirus 229E, Common Cold Virus Not Detected     Coronavirus HKU1, Common Cold Virus Not Detected     Coronavirus NL63, Common Cold Virus Not Detected     Coronavirus OC43, Common Cold Virus Not Detected     SARS-CoV2 (COVID-19) Qualitative PCR Not Detected     Human Metapneumovirus Not Detected     Human Rhinovirus/Enterovirus Not Detected     Influenza A Not Detected     Influenza B Not Detected     Parainfluenza Virus 1 Not Detected     Parainfluenza Virus 2 Not Detected      Parainfluenza Virus 3 Detected     Parainfluenza Virus 4 Not Detected     Respiratory Syncytial Virus Not Detected     Bordetella Parapertussis (KS4171) Not Detected     Bordetella pertussis (ptxP) Not Detected     Chlamydia pneumoniae Not Detected     Mycoplasma pneumoniae Not Detected    Culture, Respiratory with Gram Stain [[id number]]     Order Status: Sent Specimen: Respiratory     Influenza A & B by Molecular [[id number]]  (Normal) Collected: 06/09/25 0910    Order Status: Completed Specimen: Nasal Swab Updated: 06/09/25 0955     INFLUENZA A MOLECULAR Negative     INFLUENZA B MOLECULAR  Negative          - 2/4 SIRS criteria: WBC/RR  - blood cx pending  - respiratory cx pending  - HS trop 12, BNP 62  - CTA chest with: mild ill-defined airspace opacities in the dependent portions of the right upper and middle lobes when compared to 05/22/2025, nonspecific.  This could be due to atelectasis, aspiration and/or pneumonia   - duonebs q6hr  - guaifenesin, Tessalon Perles prn cough  - s/p vanc + zosyn in ED, will continue for now to cover for potential HAP  - SLP consulted for evaluation

## 2025-06-10 NOTE — PLAN OF CARE
06/09/25 2009   Readmission   Was this a planned readmission? No   Why were you readmitted? Alarmed about signs/symptoms   When you left the hospital where did you go? [facility]

## 2025-06-10 NOTE — PT/OT/SLP EVAL
Speech Language Pathology Evaluation  Bedside Swallow/ Discharge Summary     Patient Name:  Jordin Allen Jr.   MRN:  [id number]  Admitting Diagnosis: Pneumonia of right lung due to infectious organism    Recommendations:                 General Recommendations:  Dysphagia therapy  Diet recommendations:  Regular Diet - IDDSI Level 7, Thin   Aspiration Precautions: Standard aspiration precautions   General Precautions: Standard,    Communication strategies:  none    Assessment:     Jordin Allen Jr. is a 79 y.o. male with an SLP diagnosis of intact oral feeding and swallowing skills to continue current diet without need for skilled speech service intervention in the acute setting.     History:     Past Medical History:   Diagnosis Date    Benign neoplasm of colon 10/01/2013    Bronchitis chronic     CAD (coronary artery disease) 10/31/2013    Cataract 2008    COPD (chronic obstructive pulmonary disease)     Emphysema of lung     Encounter for preventive health examination 03/21/2018    GERD (gastroesophageal reflux disease)     Glaucoma 2010    Hearing loss 2015    Heart attack 2013    Hx of colonic polyps     Hypertension     NAFLD (nonalcoholic fatty liver disease) 03/27/2017    SHOLA on CPAP     RLS (restless legs syndrome)     Screen for colon cancer 08/30/2013       Past Surgical History:   Procedure Laterality Date    bilateral foot surgery  1993    CARDIAC CATHETERIZATION  2013    x1    CATARACT EXTRACTION, BILATERAL      COLON SURGERY  2013    Ace Mott MD    COLONOSCOPY N/A 03/21/2018    Procedure: COLONOSCOPY;  Surgeon: Terry Mott MD;  Location: [address]);  Service: Endoscopy;  Laterality: N/A;    COLONOSCOPY N/A 04/12/2019    Procedure: COLONOSCOPY;  Surgeon: John Mantilla MD;  Location: [address]);  Service: Endoscopy;  Laterality: N/A;    COLONOSCOPY N/A 05/31/2022    Procedure: COLONOSCOPY;  Surgeon: MEDINA Farris MD;  Location: [address]);  Service:  Endoscopy;  Laterality: N/A;  fully vacc-inst portal-tb    ENDOBRONCHIAL ULTRASOUND Bilateral 04/30/2024    Procedure: ENDOBRONCHIAL ULTRASOUND (EBUS);  Surgeon: Naveed Aguero MD;  Location: [facility] OR;  Service: Pulmonary;  Laterality: Bilateral;    EYE SURGERY  2011    scrotal abscess removal      TYMPANOSTOMY TUBE PLACEMENT  2017    Jordin Allen Jr. Is a 79 y.o.M with pmhx of COPD on chronic 4L NC, GERD, HTN, SHOLA, CAD, NAFLD, dyslipidemia, L lung cancer s/p chemo and radiation c/b radiation necrosis, off immunotherapy since 12/24 metastasis to brain s/p XRT, anxiety, who presents to [facility] ED from LTAC with acute onset need for increased oxygen associated with chest pain beginning last night. Patient has not felt this chest pain since the episode. Currently no chest pain. Does endorse SOB and cough. Reports he is not coughing up anything because he feels like it is stuck in his chest. Denies fever, chills, chest pain, palpitations,  abdominal pain, n/v/d, dysuria, headaches, or any other symptoms at this time.     Prior Intubation HX:  none this admission     Modified Barium Swallow: none prior     Chest X-Rays: 6/9- Cardiac size is normal.  Lungs are clear with no significant infiltrate or vascular congestion.  No pleural fluid is seen.     Prior diet: last assessed by 2/7 /25  by speech service regular solids and thin liquids       Subjective     Pt awake and alert sitting upright in bed     Pain/Comfort:  Pain Rating 1: 0/10  Pain Rating Post-Intervention 1: 0/10    Respiratory Status: Nasal cannula, flow 5 L/min    Objective:     Oral Musculature Evaluation  Oral Musculature: WFL  Dentition: present and adequate  Secretion Management: adequate  Oral Labial Strength and Mobility: WFL  Volitional Cough: weak yet functional  Volitional Swallow: prompt  Voice Prior to PO Intake: strong and clear    Bedside Swallow Eval:   Consistencies Assessed:  Thin liquids single sips from open cup 5oz   Solids AM meal       Oral Phase:   WFL    Pharyngeal Phase:   no overt clinical signs/symptoms of aspiration  no overt clinical signs/symptoms of pharyngeal dysphagia    Compensatory Strategies  None    Treatment:  Education provided to Pt re: SLP role in acute care setting, overall impressions and therapeutic goals. Whiteboard updated.    SLP provided education regarding overall impressions and safe swallow strategies      Plan:     Plan of Care reviewed with:  patient   SLP Follow-Up:  No       Discharge recommendations:      Barriers to Discharge:  None    Time Tracking:     SLP Treatment Date:   06/10/25  Speech Start Time:  0912  Speech Stop Time:  0929     Speech Total Time (min):  17 min    Billable Minutes: Eval Swallow and Oral Function 8 and Self Care/Home Management Training 9    06/10/2025

## 2025-06-10 NOTE — PROGRESS NOTES
[facility] - Cardiology Stepdown  Adult Nutrition  Progress Note    SUMMARY       Recommendations    Recommendation/Intervention:   1. Continue cardiac diet as tolerated     - please continue to document PO % intake via flowsheets     2. Recommend boost plus chocolate BID    3. Encourage good intake     4. RD to monitor weight, labs, intake, tolerance   5. Recommend MVI     6. Recommend daily weights    Goals:   1. % nutritional needs met with diet during admission     2. Maintain weight during admission  Nutrition Goal Status: new  Communication of RD Recs: other (comment) (POC)    Nutrition Discharge Planning    Nutrition Discharge Planning: General healthy diet, Therapeutic diet (comments), Oral supplement regimen (comments)  Therapeutic diet (comments): cardiac diet  Oral supplement regimen (comments): oral nutrition supplement of choice and MVI    Assessment and Plan    Pending NFPE    Reason for Assessment    Reason For Assessment: identified at risk by screening criteria (MST 3)  Diagnosis: pulmonary disease (pneumonia of right lung due to infectious organism)  General Information Comments: Pt admitted with pneumonia of right lung due to infectious organism. PMHx: HTN, DLD. 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 No new surgical hx. No edema noted. Wound: pressure injury on nose. No GI s/s - BM: 6/9. Was having a fair appetite at LTAC. Having a good intake - PO: 75%. Was drinking boost at LTAC. Minor wt fluctuations noted due to edema/fluid.    Nutrition/Diet History    Spiritual, Cultural Beliefs, Buddhist Practices, Values that Affect Care: no  Food Allergies: NKFA  Factors Affecting Nutritional Intake: decreased appetite    Nutrition Related Social Determinants of Health: SDOH: Adequate food in home environment    Food Insecurity: No Food Insecurity  "(6/10/2025)    Hunger Vital Sign     Worried About Running Out of Food in the Last Year: Never true     Ran Out of Food in the Last Year: Never true     Anthropometrics    Height: 5' 9" (175.3 cm)  Height (inches): 69 in  Height Method: Stated  Weight: 86.3 kg (190 lb 4.1 oz)  Weight (lb): 190.26 lb  Weight Method: Bed Scale  Ideal Body Weight (IBW), Male: 160 lb  % Ideal Body Weight, Male (lb): 118.91 %  BMI (Calculated): 28.1  BMI Grade: 25 - 29.9 - overweight    Lab/Procedures/Meds    Pertinent Labs Reviewed: reviewed  Pertinent Labs Comments: H/H 11.6/36.6 low, Na 134 low, Cl 92 low, BUN 34 high, glucose 150 high, P 2.6 low, albumin 2.2 low,  high,  high  Pertinent Medications Reviewed: reviewed  Pertinent Medications Comments: amitriptyline, aspirin, atorvastatin, guaifenesin, levetiracetam, losartan, pantoprazole, piperacillin, prednisone, roflumilast, trazodone, vancomycin    Estimated/Assessed Needs    Weight Used For Calorie Calculations: 86.3 kg (190 lb 4.1 oz)  Energy Calorie Requirements (kcal): 2051 kcal  Energy Need Method: [place]-St Jeor (* 1.3)  Protein Requirements:  g/pro (1.0-1.2 g/kg)  Weight Used For Protein Calculations: 86.3 kg (190 lb 4.1 oz)        RDA Method (mL): 2051  CHO Requirement: 256 g    Nutrition Prescription Ordered    Current Diet Order: cardiac    Evaluation of Received Nutrient/Fluid Intake    Energy Calories Required: not meeting needs  Protein Required: not meeting needs  Fluid Required: other (see comments) (per MD)  Tolerance: tolerating  % Intake of Estimated Energy Needs: Other: 75%  % Meal Intake: Other: 75%    PES Statement    Inadequate energy intake (suspected) related to Inadequate protein energy intake as evidenced by Intake <75% estimated needs (MST 3)  Status: New    Nutrition Risk    Level of Risk/Frequency of Follow-up: moderate (1-2/week)     Monitor and Evaluation    Monitor and Evaluation: Energy intake, Food and beverage intake, Protein " intake, Carbohydrate intake, Diet order, Weight, Electrolyte and renal panel, Gastrointestinal profile, Glucose/endocrine profile, Inflammatory profile, Lipid profile, Nutrition focused physical findings, Skin     Nutrition Follow-Up    RD Follow-up?: Yes

## 2025-06-10 NOTE — CARE UPDATE
Spoke with EP, they recommend gen cards consult. Given rxn to metoprolol and normal ef, gen cards recommends 20mg IV diltiazem and assess response. Increasing lovenox to therapeutic dosing.

## 2025-06-10 NOTE — PLAN OF CARE
Problem: Skin Injury Risk Increased  Goal: Skin Health and Integrity  Outcome: Progressing     Problem: Adult Inpatient Plan of Care  Goal: Plan of Care Review  Outcome: Progressing  Goal: Patient-Specific Goal (Individualized)  Outcome: Progressing  Goal: Absence of Hospital-Acquired Illness or Injury  Outcome: Progressing  Goal: Optimal Comfort and Wellbeing  Outcome: Progressing  Goal: Readiness for Transition of Care  Outcome: Progressing     Problem: COPD (Chronic Obstructive Pulmonary Disease)  Goal: Optimal Chronic Illness Coping  Outcome: Progressing  Goal: Optimal Level of Functional [place]  Outcome: Progressing  Goal: Absence of Infection Signs and Symptoms  Outcome: Progressing  Goal: Improved Oral Intake  Outcome: Progressing  Goal: Effective Oxygenation and Ventilation  Outcome: Progressing     Problem: Pneumonia  Goal: Fluid Balance  Outcome: Progressing  Goal: Resolution of Infection Signs and Symptoms  Outcome: Progressing  Goal: Effective Oxygenation and Ventilation  Outcome: Progressing     Problem: Wound  Goal: Optimal Coping  Outcome: Progressing  Goal: Optimal Functional Ability  Outcome: Progressing  Goal: Absence of Infection Signs and Symptoms  Outcome: Progressing  Goal: Improved Oral Intake  Outcome: Progressing  Goal: Optimal Pain Control and Function  Outcome: Progressing  Goal: Skin Health and Integrity  Outcome: Progressing  Goal: Optimal Wound Healing  Outcome: Progressing     Problem: Infection  Goal: Absence of Infection Signs and Symptoms  Outcome: Progressing

## 2025-06-10 NOTE — CARE UPDATE
Heart improved but then increased back to 154 on tele. Cards recommends giving another 20mg IV diltiazem, and if failure to control heart rate, initiate diltiazem drip. Night team aware

## 2025-06-10 NOTE — HPI
Mr. Allen is a 79 year old man with COPD (on 4L NC), SHOLA, HTN, CAD, MAFLD, metastatic left lung cancer (brain mets) s/p chemo/XRT/immunotherapy (last dose 12/2024), GERD, and HLD who was admitted with acute on chronic hypoxic respiratory failure iso of parainfluenza PNA.    Cardiology consulted for Aflutter vs. SVT with RVR.    Patient initially presented from NH for acute on chronic hypoxemic respiratory failure with non-productive cough and chest pain. On arrival noted to have leukocytosis with left shift, normal Cr, normal HS-troponin and BNP, normal lactate, covid/flu negative. Ultimately RPP was positive for parainfluenza. Initially workup included a CTA chest without evidence of PE but with findings that could be consistent with PNA. He was initially treated with duonebs, dexamethasone 10mg inj, vanc and zosyn. Since then his hypoxia has improved however on 6/9 developed SVT with narrow QRS sometimes with variability consistent with either MAT or Afib w/ RVR and at times at a fixed rate with suspicion more for 2:1 atrial flutter vs. Atrial tach. Today (6/10) patient with RVR promptine consult. Patient BP has remained stable overall though has rates up to 150's with regular interval most concerning for atrial fibrillation that has organized into flutter with 2:1 conduction.    Patient has logged allergy to metoprolol. Has recent TTE with normal LV systolic and diastolic function. He received IV diltiazem 20mg with transient improvement in HR to 110-120's however after a period returned to 150's. Around 8PM patient converted to sinus rhythm with PAC's after 10mg IV diltiazem. At LTACH patient on 120mg Diltiazem q8hrs.

## 2025-06-10 NOTE — NURSING
Noticed HR in 150's,informed primary doctor,as adv. By him,EKG was done,started SCD,Vitals are stable, patient denies any pain or discomfort.

## 2025-06-10 NOTE — ASSESSMENT & PLAN NOTE
"Patient has a diagnosis of pneumonia. The cause of the pneumonia is unknown at this time. The pneumonia is stable. The patient has the following signs/symptoms of pneumonia: persistent hypoxia , cough, shortness of breath, and chest pain. The patient does have a current oxygen requirement and the patient does have a home oxygen requirement. I have reviewed the pertinent imaging. The following cultures have been collected: Blood cultures and Sputum culture The culture results are listed below.     Current antimicrobial regimen consists of the antibiotics listed below. Will monitor patient closely and continue current treatment plan unchanged.    Antibiotics (From admission, onward)      Start     Stop Route Frequency Ordered    06/10/25 0800  vancomycin (VANCOCIN) 1,000 mg in 0.9% NaCl 250 mL IVPB (admixture device)         -- IV Every 12 hours (non-standard times) 06/10/25 0648    06/09/25 1830  piperacillin-tazobactam (ZOSYN) 4.5 g in D5W 100 mL IVPB (MB+)         -- IV Every 8 hours (non-standard times) 06/09/25 1436    06/09/25 1535  vancomycin - pharmacy to dose  (vancomycin IVPB (PEDS and ADULTS))        Placed in "And" Linked Group    -- IV pharmacy to manage frequency 06/09/25 1436            Microbiology Results (last 7 days)       Procedure Component Value Units Date/Time    Blood culture #1 **CANNOT BE ORDERED STAT** [[id number]]  (Normal) Collected: 06/09/25 1226    Order Status: Completed Specimen: Blood from Wrist, Left Updated: 06/09/25 2201     Blood Culture No Growth After 6 Hours    Blood culture #2 **CANNOT BE ORDERED STAT** [[id number]]  (Normal) Collected: 06/09/25 1226    Order Status: Completed Specimen: Blood from Peripheral, Antecubital, Right Updated: 06/09/25 2201     Blood Culture No Growth After 6 Hours    Respiratory Infection Panel (PCR), Nasopharyngeal [[id number]]  (Abnormal) Collected: 06/09/25 1444    Order Status: Completed Specimen: Nasopharyngeal Swab Updated: 06/09/25 1718     " Respiratory Infection Panel Source Nasopharyngeal Swab     Adenovirus Not Detected     Coronavirus 229E, Common Cold Virus Not Detected     Coronavirus HKU1, Common Cold Virus Not Detected     Coronavirus NL63, Common Cold Virus Not Detected     Coronavirus OC43, Common Cold Virus Not Detected     SARS-CoV2 (COVID-19) Qualitative PCR Not Detected     Human Metapneumovirus Not Detected     Human Rhinovirus/Enterovirus Not Detected     Influenza A Not Detected     Influenza B Not Detected     Parainfluenza Virus 1 Not Detected     Parainfluenza Virus 2 Not Detected     Parainfluenza Virus 3 Detected     Parainfluenza Virus 4 Not Detected     Respiratory Syncytial Virus Not Detected     Bordetella Parapertussis (QR0583) Not Detected     Bordetella pertussis (ptxP) Not Detected     Chlamydia pneumoniae Not Detected     Mycoplasma pneumoniae Not Detected    Culture, Respiratory with Gram Stain [[id number]]     Order Status: Sent Specimen: Respiratory     Influenza A & B by Molecular [[id number]]  (Normal) Collected: 06/09/25 0910    Order Status: Completed Specimen: Nasal Swab Updated: 06/09/25 0955     INFLUENZA A MOLECULAR Negative     INFLUENZA B MOLECULAR  Negative          - 2/4 SIRS criteria: WBC/RR  - blood cx pending  - respiratory cx pending  - HS trop 12, BNP 62  - CTA chest with: mild ill-defined airspace opacities in the dependent portions of the right upper and middle lobes when compared to 05/22/2025, nonspecific.  This could be due to atelectasis, aspiration and/or pneumonia   - duonebs q6hr  - guaifenesin, Tessalon Perles prn cough  - s/p vanc + zosyn in ED, will continue for now to cover for potential HAP  - SLP consulted for evaluation      6/10  Oxygen requirements improving.  Parainfluenza positive, continue with supportive care.  Follow up with sputum cultures.  Continue with treatment for COPD exacerbation with prednisone and nebs.  Continue broad-spectrum antibiotics vancomycin and Zosyn for now.   "Monitor LFTs. F/u slp  Patient has a diagnosis of pneumonia. The cause of the pneumonia is unknown at this time. The pneumonia is stable. The patient has the following signs/symptoms of pneumonia: persistent hypoxia , cough, shortness of breath, and chest pain. The patient does have a current oxygen requirement and the patient does have a home oxygen requirement. I have reviewed the pertinent imaging. The following cultures have been collected: Blood cultures and Sputum culture The culture results are listed below.     Current antimicrobial regimen consists of the antibiotics listed below. Will monitor patient closely and continue current treatment plan unchanged.    Antibiotics (From admission, onward)      Start     Stop Route Frequency Ordered    06/10/25 0800  vancomycin (VANCOCIN) 1,000 mg in 0.9% NaCl 250 mL IVPB (admixture device)         -- IV Every 12 hours (non-standard times) 06/10/25 0648    06/09/25 1830  piperacillin-tazobactam (ZOSYN) 4.5 g in D5W 100 mL IVPB (MB+)         -- IV Every 8 hours (non-standard times) 06/09/25 1436    06/09/25 1535  vancomycin - pharmacy to dose  (vancomycin IVPB (PEDS and ADULTS))        Placed in "And" Linked Group    -- IV pharmacy to manage frequency 06/09/25 1436           Procedure Component Value Units Date/Time    Blood culture #1 **CANNOT BE ORDERED STAT** [[id number]]  (Normal) Collected: 06/09/25 1226    Order Status: Completed Specimen: Blood from Wrist, Left Updated: 06/09/25 2201     Blood Culture No Growth After 6 Hours    Blood culture #2 **CANNOT BE ORDERED STAT** [[id number]]  (Normal) Collected: 06/09/25 1226    Order Status: Completed Specimen: Blood from Peripheral, Antecubital, Right Updated: 06/09/25 2201     Blood Culture No Growth After 6 Hours    Respiratory Infection Panel (PCR), Nasopharyngeal [[id number]]  (Abnormal) Collected: 06/09/25 1444    Order Status: Completed Specimen: Nasopharyngeal Swab Updated: 06/09/25 1718     Respiratory " Infection Panel Source Nasopharyngeal Swab     Adenovirus Not Detected     Coronavirus 229E, Common Cold Virus Not Detected     Coronavirus HKU1, Common Cold Virus Not Detected     Coronavirus NL63, Common Cold Virus Not Detected     Coronavirus OC43, Common Cold Virus Not Detected     SARS-CoV2 (COVID-19) Qualitative PCR Not Detected     Human Metapneumovirus Not Detected     Human Rhinovirus/Enterovirus Not Detected     Influenza A Not Detected     Influenza B Not Detected     Parainfluenza Virus 1 Not Detected     Parainfluenza Virus 2 Not Detected     Parainfluenza Virus 3 Detected     Parainfluenza Virus 4 Not Detected     Respiratory Syncytial Virus Not Detected     Bordetella Parapertussis (RL6331) Not Detected     Bordetella pertussis (ptxP) Not Detected     Chlamydia pneumoniae Not Detected     Mycoplasma pneumoniae Not Detected    Culture, Respiratory with Gram Stain [[id number]]     Order Status: Sent Specimen: Respiratory     Influenza A & B by Molecular [[id number]]  (Normal) Collected: 06/09/25 0910    Order Status: Completed Specimen: Nasal Swab Updated: 06/09/25 0955     INFLUENZA A MOLECULAR Negative     INFLUENZA B MOLECULAR  Negative           Respiratory Infection Panel (PCR), Nasopharyngeal [[id number]]  (Abnormal) Collected: 06/09/25 1444    Order Status: Completed Specimen: Nasopharyngeal Swab Updated: 06/09/25 1718     Respiratory Infection Panel Source Nasopharyngeal Swab     Adenovirus Not Detected     Coronavirus 229E, Common Cold Virus Not Detected     Coronavirus HKU1, Common Cold Virus Not Detected     Coronavirus NL63, Common Cold Virus Not Detected     Coronavirus OC43, Common Cold Virus Not Detected     SARS-CoV2 (COVID-19) Qualitative PCR Not Detected     Human Metapneumovirus Not Detected     Human Rhinovirus/Enterovirus Not Detected     Influenza A Not Detected     Influenza B Not Detected     Parainfluenza Virus 1 Not Detected     Parainfluenza Virus 2 Not Detected      Parainfluenza Virus 3 Detected     Parainfluenza Virus 4 Not Detected     Respiratory Syncytial Virus Not Detected     Bordetella Parapertussis (AP4096) Not Detected     Bordetella pertussis (ptxP) Not Detected     Chlamydia pneumoniae Not Detected     Mycoplasma pneumoniae Not Detected    Culture, Respiratory with Gram Stain [[id number]]     Order Status: Sent Specimen: Respiratory     Influenza A & B by Molecular [[id number]]  (Normal) Collected: 06/09/25 0910    Order Status: Completed Specimen: Nasal Swab Updated: 06/09/25 0955     INFLUENZA A MOLECULAR Negative     INFLUENZA B MOLECULAR  Negative          - 2/4 SIRS criteria: WBC/RR  - blood cx pending  - respiratory cx pending  - HS trop 12, BNP 62  - CTA chest with: mild ill-defined airspace opacities in the dependent portions of the right upper and middle lobes when compared to 05/22/2025, nonspecific.  This could be due to atelectasis, aspiration and/or pneumonia   - duonebs q6hr  - guaifenesin, Tessalon Perles prn cough  - s/p vanc + zosyn in ED, will continue for now to cover for potential HAP  - SLP consulted for evaluation

## 2025-06-10 NOTE — CARE UPDATE
"RAPID RESPONSE NURSE CHART REVIEW        Chart Reviewed: 06/10/2025, 3:26 PM    MRN: [id number]  Bed: 342/342 A    Dx: Pneumonia of right lung due to infectious organism    Jordin Allen Jr. has a past medical history of Benign neoplasm of colon, Bronchitis chronic, CAD (coronary artery disease), Cataract, COPD (chronic obstructive pulmonary disease), Emphysema of lung, Encounter for preventive health examination, GERD (gastroesophageal reflux disease), Glaucoma, Hearing loss, Heart attack, colonic polyps, Hypertension, NAFLD (nonalcoholic fatty liver disease), SHOLA on CPAP, RLS (restless legs syndrome), and Screen for colon cancer.    Last VS: /73   Pulse (!) 152   Temp 97.3 °F (36.3 °C) (Oral)   Resp 16   Ht 5' 9" (1.753 m)   Wt 86.3 kg (190 lb 4.1 oz)   SpO2 98%   BMI 28.10 kg/m²     24H Vital Sign Range:  Temp:  [97.3 °F (36.3 °C)-98.2 °F (36.8 °C)]   Pulse:  []   Resp:  [14-23]   BP: ()/(56-79)   SpO2:  [89 %-100 %]     Level of Consciousness (AVPU): alert    Recent Labs     06/09/25  0553 06/09/25  0905 06/10/25  0330   WBC 14.42* 15.55* 12.20   HGB 11.9* 11.9* 11.6*   HCT 36.9* 36.8* 36.6*   * 129* 130*       Recent Labs     06/09/25  0553 06/09/25  0905 06/10/25  0330   * 135* 134*   K 3.6 3.5 4.0   CL 87* 88* 92*   CO2 32* 34* 29   BUN 25* 26* 34*   CREATININE 0.9 0.9 0.9   GLU 98 109 150*   PHOS 2.4*  --  2.6*   MG 1.9  --  2.1        Recent Labs     06/09/25  0720   PH 7.499*   PCO2 56.3*   PO2 79*   HCO3 43.8*   POCSATURATED 96   BE 21*        OXYGEN:  Flow (L/min) (Oxygen Therapy): 56     MEWS score: 1    Contacted at 3:25 PM.    Bedside nurse, Sophie, contacted for tachycardia. RN reports primary MD has been notified. 12 lead EKG currently in progress. RN reports pt is asymptomatic. She also states she will assess BP and other VS ASAP. Continuous telemetry monitoring in place. No additional acute concerns verbalized at this time. Instructed to call [phone] for " further concerns or assistance.    Thu Coyne RN

## 2025-06-10 NOTE — HOSPITAL COURSE
Patient transferred from LTAC for worsening hypoxic respiratory failure.  He was admitted to LTAC for weaning off oxygen.  CT with findings of pneumonia.  Parainfluenza positive.  Antibiotics broadened to vancomycin and Zosyn, sputum cultures ordered, supportive care and treatment for COPD exacerbation.  Patient reports symptoms have improved.

## 2025-06-10 NOTE — NURSING
As per order, IV Diltiazem 4 mg slow IV push given,vitals are stable, patient is comfortable in bed.

## 2025-06-10 NOTE — PLAN OF CARE
[facility] - Emergency Dept  Initial Discharge Assessment       Primary Care Provider: Sierra Landry MD    Admission Diagnosis: Pneumonia due to infectious organism, unspecified laterality, unspecified part of lung [J18.9]    Admission Date: 6/9/2025  Expected Discharge Date:     Transition of Care Barriers: (P) None    Payor: MEDICARE / Plan: MEDICARE PART A & B / Product Type: Government /     Extended Emergency Contact Information  Primary Emergency Contact: Marilou Grimaldo  Address: [address] [place] [address] [place]  Mobile Phone: [phone]  Relation: Daughter  Preferred language: English   needed? No  Secondary Emergency Contact: Marzena Schwartz   [facility]  Home Phone: [phone]  Mobile Phone: [phone]  Relation: Daughter   needed? No    Discharge Plan A: (P) Long-term acute care facility (LTAC)  Discharge Plan B: (P) Long-term acute care facility (LTAC)      [facility] [address]  [place] [address]  Phone: [phone] Fax: [phone]    [id number] - [place], [place] - [address] [facility]  [address] [facility] [address]  Phone: [phone] Fax: [phone]    [id number] - [place], [place] - [address]  [address]  [place] [address]  Phone: [phone] Fax: [phone]      Initial Assessment (most recent)       Adult Discharge Assessment - 06/09/25 2000          Discharge Assessment    Assessment Type Discharge Planning Assessment (P)      Confirmed/corrected address, phone number and insurance Yes (P)      Confirmed Demographics Correct on Facesheet (P)      Source of Information patient;health record (P)      Communicated MEI with patient/caregiver Yes (P)      People in Home facility resident (P)      Do you expect to return to your current living situation? Yes (P)      Prior to hospitilization cognitive status: Alert/Oriented (P)       Current cognitive status: Alert/Oriented (P)      Equipment Currently Used at Home oxygen (P)      Do you take prescription medications? Yes (P)      Do you have prescription coverage? Yes (P)      Do you have any problems affording any of your prescribed medications? No (P)      Is the patient taking medications as prescribed? yes (P)      How do you get to doctors appointments? health plan transportation (P)      Are you on dialysis? No (P)      Discharge Plan A Long-term acute care facility (LTAC) (P)      Discharge Plan B Long-term acute care facility (LTAC) (P)      DME Needed Upon Discharge  none (P)      Discharge Plan discussed with: Patient (P)      Transition of Care Barriers None (P)         Physical Activity    On average, how many days per week do you engage in moderate to strenuous exercise (like a brisk walk)? 0 days (P)         Housing Stability    In the last 12 months, was there a time when you were not able to pay the mortgage or rent on time? No (P)      At any time in the past 12 months, were you homeless or living in a shelter (including now)? No (P)         Transportation Needs    In the past 12 months, has lack of transportation kept you from medical appointments or from getting medications? No (P)      In the past 12 months, has lack of transportation kept you from meetings, work, or from getting things needed for daily living? No (P)         Food Insecurity    Within the past 12 months, you worried that your food would run out before you got the money to buy more. Never true (P)         Stress    Do you feel stress - tense, restless, nervous, or anxious, or unable to sleep at night because your mind is troubled all the time - these days? Only a little (P)         Social Isolation    How often do you feel lonely or isolated from those around you?  Rarely (P)         Alcohol Use    Q2: How many drinks containing alcohol do you have on a typical day when you are drinking? Patient does not  drink (P)         Utilities    In the past 12 months has the electric, gas, oil, or water company threatened to shut off services in your home? No (P)         Health Literacy    How often do you need to have someone help you when you read instructions, pamphlets, or other written material from your doctor or pharmacy? Sometimes (P)

## 2025-06-10 NOTE — CARE UPDATE
Tachycardia noted. ? SVT. Stat EP consult. Had CTA yesterday negative for PE, last received lovenox 6/8, PE is less likely but given tachycardia, will repeat CT. Will continue dvt prophylaxis with lovenox for now

## 2025-06-10 NOTE — ASSESSMENT & PLAN NOTE
Patient's blood pressure range in the last 24 hours was: BP  Min: 95/69  Max: 129/58.The patient's inpatient anti-hypertensive regimen is listed below:  Current Antihypertensives  losartan tablet 100 mg, Daily, Oral    Plan  - BP is controlled, no changes needed to their regimen

## 2025-06-10 NOTE — PLAN OF CARE
Recommendations     Recommendation/Intervention:   1. Continue cardiac diet as tolerated     - please continue to document PO % intake via flowsheets     2. Recommend boost plus chocolate BID    3. Encourage good intake     4. RD to monitor weight, labs, intake, tolerance   5. Recommend MVI     6. Recommend daily weights     Goals:   1. % nutritional needs met with diet during admission     2. Maintain weight during admission  Nutrition Goal Status: new  Communication of RD Recs: other (comment) (POC)     Nutrition Discharge Planning     Nutrition Discharge Planning: General healthy diet, Therapeutic diet (comments), Oral supplement regimen (comments)  Therapeutic diet (comments): cardiac diet  Oral supplement regimen (comments): oral nutrition supplement of choice and MVI

## 2025-06-10 NOTE — ASSESSMENT & PLAN NOTE
Patient with mix of irregular and regular SVT concerning for MAT vs afib with suspected atrial Flutter with 2:1 conduction though cannot be certain based on ECG and telemetry. This has occurred in the setting of acute on chronic hypoxic respiratory failure in the setting of parainfluenza PNA.He has a recent normal TTE and an allergy to metoprolol. He converted to sinus rhythm with PACs after 2 pushes of IV diltiazem.    Recommendations:  - telemetry  - daily ECG  - K > 4, Mg > 2  - if able from respiratory perspective, would try to reduce albuterol use temporarily  - diltiazem 30mg q6 hrs, will plan to transition to long acting dosage tomorrow  - recommend AC given CHADSVASc of 4 (additionally has cancer)  - outpatient follow up with Dr. Ba

## 2025-06-10 NOTE — CARE UPDATE
EP Care Update Note    Device interrogation performed today showing episodes of atrial fibrillation. Recommend patient start Amiodarone 400mg bid for 14 days followed by 200mg daily    Discussed with Dr. Schuyler TORRES MD  Cardiology Fellow  Pager: [phone]  [facility]

## 2025-06-11 VITALS
TEMPERATURE: 97 F | WEIGHT: 192.69 LBS | HEART RATE: 76 BPM | HEIGHT: 69 IN | SYSTOLIC BLOOD PRESSURE: 97 MMHG | DIASTOLIC BLOOD PRESSURE: 58 MMHG | OXYGEN SATURATION: 97 % | BODY MASS INDEX: 28.54 KG/M2 | RESPIRATION RATE: 18 BRPM

## 2025-06-11 LAB
ABSOLUTE EOSINOPHIL (OHS): 0 K/UL
ABSOLUTE MONOCYTE (OHS): 0.37 K/UL (ref 0.3–1)
ABSOLUTE NEUTROPHIL COUNT (OHS): 10.92 K/UL (ref 1.8–7.7)
ALBUMIN SERPL BCP-MCNC: 1.9 G/DL (ref 3.5–5.2)
ALP SERPL-CCNC: 116 UNIT/L (ref 40–150)
ALT SERPL W/O P-5'-P-CCNC: 157 UNIT/L (ref 10–44)
ANION GAP (OHS): 11 MMOL/L (ref 8–16)
AORTIC SIZE INDEX (SOV): 1.7 CM/M2
AORTIC SIZE INDEX: 1.2 CM/M2
ASCENDING AORTA: 2.5 CM
AST SERPL-CCNC: 121 UNIT/L (ref 11–45)
AV AREA BY CONTINUOUS VTI: 4.3 CM2
AV INDEX (PROSTH): 1.21
AV LVOT MEAN GRADIENT: 3 MMHG
AV LVOT PEAK GRADIENT: 6 MMHG
AV MEAN GRADIENT: 3 MMHG
AV PEAK GRADIENT: 5 MMHG
AV VALVE AREA BY VELOCITY RATIO: 4.1 CM²
AV VALVE AREA: 4.2 CM2
AV VELOCITY RATIO: 1.18
BASOPHILS # BLD AUTO: 0.01 K/UL
BASOPHILS NFR BLD AUTO: 0.1 %
BILIRUB SERPL-MCNC: 0.5 MG/DL (ref 0.1–1)
BSA FOR ECHO PROCEDURE: 2.05 M2
BUN SERPL-MCNC: 34 MG/DL (ref 8–23)
CALCIUM SERPL-MCNC: 8.3 MG/DL (ref 8.7–10.5)
CHLORIDE SERPL-SCNC: 94 MMOL/L (ref 95–110)
CO2 SERPL-SCNC: 30 MMOL/L (ref 23–29)
CREAT SERPL-MCNC: 0.9 MG/DL (ref 0.5–1.4)
CV ECHO LV RWT: 0.36 CM
DOP CALC AO PEAK VEL: 1.1 M/S
DOP CALC AO VTI: 19.8 CM
DOP CALC LVOT AREA: 3.5 CM2
DOP CALC LVOT DIAMETER: 2.1 CM
DOP CALC LVOT PEAK VEL: 1.3 M/S
DOP CALC LVOT STROKE VOLUME: 82.7 CM3
DOP CALCLVOT PEAK VEL VTI: 23.9 CM
E WAVE DECELERATION TIME: 200 MS
E/A RATIO: 1.03
E/E' RATIO: 9 M/S
ECHO EF ESTIMATED: 70 %
ECHO LV POSTERIOR WALL: 0.8 CM (ref 0.6–1.1)
ERYTHROCYTE [DISTWIDTH] IN BLOOD BY AUTOMATED COUNT: 16 % (ref 11.5–14.5)
FRACTIONAL SHORTENING: 38.6 % (ref 28–44)
GFR SERPLBLD CREATININE-BSD FMLA CKD-EPI: >60 ML/MIN/1.73/M2
GLUCOSE SERPL-MCNC: 162 MG/DL (ref 70–110)
HCT VFR BLD AUTO: 33.3 % (ref 40–54)
HGB BLD-MCNC: 10.9 GM/DL (ref 14–18)
IMM GRANULOCYTES # BLD AUTO: 0.27 K/UL (ref 0–0.04)
IMM GRANULOCYTES NFR BLD AUTO: 2.3 % (ref 0–0.5)
INTERVENTRICULAR SEPTUM: 0.7 CM (ref 0.6–1.1)
IVC DIAMETER: 1.59 CM
LA MAJOR: 5.3 CM
LA MINOR: 4.4 CM
LA WIDTH: 3.3 CM
LEFT ATRIUM SIZE: 2.2 CM
LEFT ATRIUM VOLUME INDEX MOD: 21 ML/M2
LEFT ATRIUM VOLUME INDEX: 15 ML/M2
LEFT ATRIUM VOLUME MOD: 42 ML
LEFT ATRIUM VOLUME: 30 CM3
LEFT INTERNAL DIMENSION IN SYSTOLE: 2.7 CM (ref 2.1–4)
LEFT VENTRICLE DIASTOLIC VOLUME INDEX: 44.06 ML/M2
LEFT VENTRICLE DIASTOLIC VOLUME: 89 ML
LEFT VENTRICLE MASS INDEX: 49.8 G/M2
LEFT VENTRICLE SYSTOLIC VOLUME INDEX: 13.4 ML/M2
LEFT VENTRICLE SYSTOLIC VOLUME: 27 ML
LEFT VENTRICULAR INTERNAL DIMENSION IN DIASTOLE: 4.4 CM (ref 3.5–6)
LEFT VENTRICULAR MASS: 100.6 G
LV LATERAL E/E' RATIO: 7.1
LV SEPTAL E/E' RATIO: 13
LYMPHOCYTES # BLD AUTO: 0.25 K/UL (ref 1–4.8)
MAGNESIUM SERPL-MCNC: 2.1 MG/DL (ref 1.6–2.6)
MCH RBC QN AUTO: 28.8 PG (ref 27–31)
MCHC RBC AUTO-ENTMCNC: 32.7 G/DL (ref 32–36)
MCV RBC AUTO: 88 FL (ref 82–98)
MV PEAK A VEL: 0.76 M/S
MV PEAK E VEL: 0.78 M/S
NUCLEATED RBC (/100WBC) (OHS): 0 /100 WBC
OHS QRS DURATION: 84 MS
OHS QTC CALCULATION: 507 MS
PHOSPHATE SERPL-MCNC: 3.2 MG/DL (ref 2.7–4.5)
PLATELET # BLD AUTO: 143 K/UL (ref 150–450)
PMV BLD AUTO: 11.2 FL (ref 9.2–12.9)
POTASSIUM SERPL-SCNC: 3.1 MMOL/L (ref 3.5–5.1)
PROT SERPL-MCNC: 5.1 GM/DL (ref 6–8.4)
RA PRESSURE ESTIMATED: 8 MMHG
RBC # BLD AUTO: 3.79 M/UL (ref 4.6–6.2)
RELATIVE EOSINOPHIL (OHS): 0 %
RELATIVE LYMPHOCYTE (OHS): 2.1 % (ref 18–48)
RELATIVE MONOCYTE (OHS): 3.1 % (ref 4–15)
RELATIVE NEUTROPHIL (OHS): 92.4 % (ref 38–73)
RV TISSUE DOPPLER FREE WALL SYSTOLIC VELOCITY 1 (APICAL 4 CHAMBER VIEW): 13.43 CM/S
SINUS: 3.43 CM
SODIUM SERPL-SCNC: 135 MMOL/L (ref 136–145)
STJ: 2.9 CM
TDI LATERAL: 0.11 M/S
TDI SEPTAL: 0.06 M/S
TDI: 0.09 M/S
TRICUSPID ANNULAR PLANE SYSTOLIC EXCURSION: 1.6 CM
WBC # BLD AUTO: 11.82 K/UL (ref 3.9–12.7)
Z-SCORE OF LEFT VENTRICULAR DIMENSION IN END DIASTOLE: -3.02
Z-SCORE OF LEFT VENTRICULAR DIMENSION IN END SYSTOLE: -2.38

## 2025-06-11 PROCEDURE — 25000003 PHARM REV CODE 250: Performed by: HOSPITALIST

## 2025-06-11 PROCEDURE — 63600175 PHARM REV CODE 636 W HCPCS: Performed by: INTERNAL MEDICINE

## 2025-06-11 PROCEDURE — 94640 AIRWAY INHALATION TREATMENT: CPT

## 2025-06-11 PROCEDURE — 99900035 HC TECH TIME PER 15 MIN (STAT)

## 2025-06-11 PROCEDURE — 27100171 HC OXYGEN HIGH FLOW UP TO 24 HOURS

## 2025-06-11 PROCEDURE — 11000001 HC ACUTE MED/SURG PRIVATE ROOM

## 2025-06-11 PROCEDURE — 83735 ASSAY OF MAGNESIUM: CPT

## 2025-06-11 PROCEDURE — 36415 COLL VENOUS BLD VENIPUNCTURE: CPT

## 2025-06-11 PROCEDURE — 25000003 PHARM REV CODE 250: Performed by: INTERNAL MEDICINE

## 2025-06-11 PROCEDURE — 25000003 PHARM REV CODE 250

## 2025-06-11 PROCEDURE — 94761 N-INVAS EAR/PLS OXIMETRY MLT: CPT

## 2025-06-11 PROCEDURE — 63600175 PHARM REV CODE 636 W HCPCS

## 2025-06-11 PROCEDURE — 85025 COMPLETE CBC W/AUTO DIFF WBC: CPT

## 2025-06-11 PROCEDURE — 99232 SBSQ HOSP IP/OBS MODERATE 35: CPT | Mod: 25,,, | Performed by: INTERNAL MEDICINE

## 2025-06-11 PROCEDURE — 99900031 HC PATIENT EDUCATION (STAT)

## 2025-06-11 PROCEDURE — 84100 ASSAY OF PHOSPHORUS: CPT

## 2025-06-11 PROCEDURE — 27000173 HC ACAPELLA DEVICE DH OR DM

## 2025-06-11 PROCEDURE — 94660 CPAP INITIATION&MGMT: CPT

## 2025-06-11 PROCEDURE — 94664 DEMO&/EVAL PT USE INHALER: CPT

## 2025-06-11 PROCEDURE — 25000003 PHARM REV CODE 250: Performed by: STUDENT IN AN ORGANIZED HEALTH CARE EDUCATION/TRAINING PROGRAM

## 2025-06-11 PROCEDURE — 82040 ASSAY OF SERUM ALBUMIN: CPT

## 2025-06-11 PROCEDURE — 25000242 PHARM REV CODE 250 ALT 637 W/ HCPCS

## 2025-06-11 PROCEDURE — 25500020 PHARM REV CODE 255: Performed by: INTERNAL MEDICINE

## 2025-06-11 RX ORDER — IBUPROFEN 200 MG
16 TABLET ORAL
Status: ON HOLD
Start: 2025-06-11 | End: 2026-06-11

## 2025-06-11 RX ORDER — DILTIAZEM HYDROCHLORIDE 180 MG/1
180 CAPSULE, COATED, EXTENDED RELEASE ORAL DAILY
Status: DISCONTINUED | OUTPATIENT
Start: 2025-06-11 | End: 2025-06-11 | Stop reason: HOSPADM

## 2025-06-11 RX ORDER — BENZONATATE 100 MG/1
100 CAPSULE ORAL 3 TIMES DAILY PRN
Status: ON HOLD
Start: 2025-06-11 | End: 2025-06-21

## 2025-06-11 RX ORDER — IBUPROFEN 200 MG
24 TABLET ORAL
Status: ON HOLD
Start: 2025-06-11 | End: 2026-06-11

## 2025-06-11 RX ORDER — GUAIFENESIN 600 MG/1
600 TABLET, EXTENDED RELEASE ORAL 2 TIMES DAILY
Status: ON HOLD
Start: 2025-06-11 | End: 2025-06-21

## 2025-06-11 RX ORDER — PREDNISONE 50 MG/1
50 TABLET ORAL DAILY
Start: 2025-06-12 | End: 2025-06-14

## 2025-06-11 RX ORDER — POTASSIUM CHLORIDE 20 MEQ/1
40 TABLET, EXTENDED RELEASE ORAL ONCE
Status: COMPLETED | OUTPATIENT
Start: 2025-06-11 | End: 2025-06-11

## 2025-06-11 RX ORDER — DILTIAZEM HYDROCHLORIDE 180 MG/1
180 CAPSULE, COATED, EXTENDED RELEASE ORAL DAILY
Status: ON HOLD
Start: 2025-06-11 | End: 2026-06-11

## 2025-06-11 RX ORDER — IPRATROPIUM BROMIDE 0.5 MG/2.5ML
500 SOLUTION RESPIRATORY (INHALATION) EVERY 6 HOURS PRN
Status: ON HOLD
Start: 2025-06-11 | End: 2026-06-11

## 2025-06-11 RX ORDER — IPRATROPIUM BROMIDE AND ALBUTEROL SULFATE 2.5; .5 MG/3ML; MG/3ML
3 SOLUTION RESPIRATORY (INHALATION) EVERY 6 HOURS PRN
Status: DISCONTINUED | OUTPATIENT
Start: 2025-06-11 | End: 2025-06-11 | Stop reason: HOSPADM

## 2025-06-11 RX ADMIN — GUAIFENESIN 600 MG: 600 TABLET, EXTENDED RELEASE ORAL at 10:06

## 2025-06-11 RX ADMIN — LEVALBUTEROL HYDROCHLORIDE 1.25 MG: 0.63 SOLUTION RESPIRATORY (INHALATION) at 08:06

## 2025-06-11 RX ADMIN — DILTIAZEM HYDROCHLORIDE 60 MG: 30 TABLET, FILM COATED ORAL at 04:06

## 2025-06-11 RX ADMIN — HUMAN ALBUMIN MICROSPHERES AND PERFLUTREN 0.66 MG: 10; .22 INJECTION, SOLUTION INTRAVENOUS at 10:06

## 2025-06-11 RX ADMIN — DILTIAZEM HYDROCHLORIDE 60 MG: 30 TABLET, FILM COATED ORAL at 10:06

## 2025-06-11 RX ADMIN — FLUTICASONE PROPIONATE 100 MCG: 50 SPRAY, METERED NASAL at 10:06

## 2025-06-11 RX ADMIN — VANCOMYCIN HYDROCHLORIDE 1000 MG: 1 INJECTION, POWDER, LYOPHILIZED, FOR SOLUTION INTRAVENOUS at 10:06

## 2025-06-11 RX ADMIN — PIPERACILLIN SODIUM AND TAZOBACTAM SODIUM 4.5 G: 4; .5 INJECTION, POWDER, FOR SOLUTION INTRAVENOUS at 10:06

## 2025-06-11 RX ADMIN — PIPERACILLIN SODIUM AND TAZOBACTAM SODIUM 4.5 G: 4; .5 INJECTION, POWDER, FOR SOLUTION INTRAVENOUS at 02:06

## 2025-06-11 RX ADMIN — IPRATROPIUM BROMIDE AND ALBUTEROL SULFATE 3 ML: 2.5; .5 SOLUTION RESPIRATORY (INHALATION) at 07:06

## 2025-06-11 RX ADMIN — LEVETIRACETAM 500 MG: 500 TABLET, FILM COATED ORAL at 10:06

## 2025-06-11 RX ADMIN — PANTOPRAZOLE SODIUM 40 MG: 40 TABLET, DELAYED RELEASE ORAL at 10:06

## 2025-06-11 RX ADMIN — LEVETIRACETAM 500 MG: 500 TABLET, FILM COATED ORAL at 08:06

## 2025-06-11 RX ADMIN — GUAIFENESIN 1200 MG: 600 TABLET, MULTILAYER, EXTENDED RELEASE ORAL at 08:06

## 2025-06-11 RX ADMIN — ENOXAPARIN SODIUM 40 MG: 40 INJECTION SUBCUTANEOUS at 05:06

## 2025-06-11 RX ADMIN — AMITRIPTYLINE HYDROCHLORIDE 25 MG: 25 TABLET, FILM COATED ORAL at 08:06

## 2025-06-11 RX ADMIN — PREDNISONE 50 MG: 50 TABLET ORAL at 10:06

## 2025-06-11 RX ADMIN — Medication 4 ML: at 08:06

## 2025-06-11 RX ADMIN — DILTIAZEM HYDROCHLORIDE 120 MG: 30 TABLET, FILM COATED ORAL at 10:06

## 2025-06-11 RX ADMIN — ATORVASTATIN CALCIUM 80 MG: 40 TABLET, FILM COATED ORAL at 10:06

## 2025-06-11 RX ADMIN — HYDROXYZINE HYDROCHLORIDE 25 MG: 25 TABLET, FILM COATED ORAL at 08:06

## 2025-06-11 RX ADMIN — ROFLUMILAST 500 MCG: 500 TABLET ORAL at 06:06

## 2025-06-11 RX ADMIN — MIRTAZAPINE 15 MG: 15 TABLET, FILM COATED ORAL at 08:06

## 2025-06-11 RX ADMIN — MELATONIN TAB 3 MG 6 MG: 3 TAB at 10:06

## 2025-06-11 RX ADMIN — ASPIRIN 81 MG: 81 TABLET, COATED ORAL at 06:06

## 2025-06-11 RX ADMIN — ENOXAPARIN SODIUM 90 MG: 100 INJECTION SUBCUTANEOUS at 06:06

## 2025-06-11 RX ADMIN — POTASSIUM CHLORIDE 40 MEQ: 1500 TABLET, EXTENDED RELEASE ORAL at 10:06

## 2025-06-11 NOTE — ASSESSMENT & PLAN NOTE
Patient with mix of irregular and regular SVT concerning for MAT vs afib with suspected atrial Flutter with 2:1 conduction though cannot be certain based on ECG and telemetry. This has occurred in the setting of acute on chronic hypoxic respiratory failure in the setting of parainfluenza PNA.He has a recent normal TTE and an allergy to metoprolol. He converted to sinus rhythm with PACs after 2 pushes of IV diltiazem.    Recommendations:  - telemetry  - daily ECG  - K > 4, Mg > 2  - if able from respiratory perspective, would try to reduce albuterol use temporarily  - diltiazem 180 mg SR daily  - recommend AC given CHADSVASc of 4 (additionally has cancer)  - outpatient follow up with Dr. Ba

## 2025-06-11 NOTE — CARE UPDATE
RAPID RESPONSE NURSE FOLLOW-UP NOTE       Followed up with patient for previous RRT handoff.  Pt converted to sinus rhythm around 2015 after diltiazem IVP. Dilt gtt no longer needed at this time. No acute issues at this time. Reviewed plan of care with Charge nurse, Bobbi.     Please call Rapid Response RN, Lucio Ramirez RN with any questions or concerns at [phone].

## 2025-06-11 NOTE — PLAN OF CARE
[facility] - Cardiology Stepdown  Discharge Final Note    Primary Care Provider: Sierra Landry MD    Expected Discharge Date: 6/11/2025    Patient discharged to [facility] via [facility] ambulance transportation.     Patient's bedside nurse and family notified of the above.    Please call report to [phone]. Patient will go to room 212.     Discharge Plan A and Plan B have been determined by review of patient's clinical status, future medical and therapeutic needs, and coverage/benefits for post-acute care in coordination with multidisciplinary team members.        Final Discharge Note (most recent)       Final Note - 06/11/25 1156          Final Note    Assessment Type Final Discharge Note     Anticipated Discharge Disposition Long [facility] Resources/Appts/Education Provided Provided patient/caregiver with written discharge plan information;Appointments scheduled and added to AVS        Post-Acute Status    Post-Acute Authorization Placement     Discharge Delays None known at this time                     Important Message from Medicare                 Future Appointments   Date Time Provider Department Center   6/24/2025  7:15 AM [facility] OIC-MRI2 [facility] MRI [facility]   6/24/2025  9:30 AM Steven Campos MD [facility] NEUROSC [place]   6/24/2025  2:00 PM Bienvenido Henson MD [facility] RADONC3 [place]   7/1/2025 10:30 AM [facility] OIC-XRAY [facility] XRAY [facility]   7/1/2025 11:30 AM Bolivar Sr PA [facility] NEUROS8 [facility]   7/8/2025  9:15 AM MEEKS, VISUAL FIELDS [facility] OPHTHAL [facility]   7/8/2025 10:00 AM Cara Looney MD [facility]PHTHAL [facility]   7/15/2025 10:00 AM Garrett Lloyd DPM [facility] POD [facility] [place]   7/23/2025 10:15 AM CV [facility] ECHO [facility] [place]   8/15/2025  9:20 AM Bienvenido Ward MD [facility] PULMON [place]   8/27/2025  9:00 AM uGido Trejo MD [facility] ENT [facility]   9/8/2025 11:10 AM Yan Palmer MD [facility] DERM [facility]       SW scheduled  post-discharge follow-up appointment and information added to AVS.

## 2025-06-11 NOTE — NURSING
Pt HR 150s on day shift.    2000: Continued to stay in 150s into night shift. MD notified. 20 mg IVP Diltiazem ordered. Gave 10mg of IVP Diltaizem, BP 97/64 MAP 75. See flowsheet for BP. MD notified. Hold the rest of the medication.   2200: 60mg Oral Dilatazem ordered. BP 88/57 MAP 68. Md notified. IV fluid bolus 500ml/hr ordered.   2245: BP 89/63 MAP 65  2315: /74 MAP 85

## 2025-06-11 NOTE — CONSULTS
[facility]  Cardiology  Consult Note    Patient Name: Jordin Allen Jr.  MRN: [id number]  Admission Date: 6/9/2025  Hospital Length of Stay: 1 days  Code Status: Full Code   Attending Provider: Kenny Brush MD   Consulting Provider: Prince Gay MD  Primary Care Physician: Sierra Landry MD  Principal Problem:Pneumonia of right lung due to infectious organism    Patient information was obtained from patient, past medical records, and ER records.     Inpatient consult to Cardiology  Consult performed by: Prince Gay MD  Consult ordered by: Kenny Brush MD  Reason for consult: atrial flutter        Subjective:     Chief Complaint:  SOB/Hypoxia     HPI:   Mr. Allen is a 79 year old man with COPD (on 4L NC), SHOLA, HTN, CAD, MAFLD, metastatic left lung cancer (brain mets) s/p chemo/XRT/immunotherapy (last dose 12/2024), GERD, and HLD who was admitted with acute on chronic hypoxic respiratory failure iso of parainfluenza PNA.    Cardiology consulted for Aflutter vs. SVT with RVR.    Patient initially presented from NH for acute on chronic hypoxemic respiratory failure with non-productive cough and chest pain. On arrival noted to have leukocytosis with left shift, normal Cr, normal HS-troponin and BNP, normal lactate, covid/flu negative. Ultimately RPP was positive for parainfluenza. Initially workup included a CTA chest without evidence of PE but with findings that could be consistent with PNA. He was initially treated with duonebs, dexamethasone 10mg inj, vanc and zosyn. Since then his hypoxia has improved however on 6/9 developed SVT with narrow QRS sometimes with variability consistent with either MAT or Afib w/ RVR and at times at a fixed rate with suspicion more for 2:1 atrial flutter vs. Atrial tach. Today (6/10) patient with RVR promptine consult. Patient BP has remained stable overall though has rates up to 150's with regular interval most concerning for atrial fibrillation that has  organized into flutter with 2:1 conduction.    Patient has logged allergy to metoprolol. Has recent TTE with normal LV systolic and diastolic function. He received IV diltiazem 20mg with transient improvement in HR to 110-120's however after a period returned to 150's. Around 8PM patient converted to sinus rhythm with PAC's after 10mg IV diltiazem.         Past Medical History:   Diagnosis Date    Benign neoplasm of colon 10/01/2013    Bronchitis chronic     CAD (coronary artery disease) 10/31/2013    Cataract 2008    COPD (chronic obstructive pulmonary disease)     Emphysema of lung     Encounter for preventive health examination 03/21/2018    GERD (gastroesophageal reflux disease)     Glaucoma 2010    Hearing loss 2015    Heart attack 2013    Hx of colonic polyps     Hypertension     NAFLD (nonalcoholic fatty liver disease) 03/27/2017    SHOLA on CPAP     RLS (restless legs syndrome)     Screen for colon cancer 08/30/2013       Past Surgical History:   Procedure Laterality Date    bilateral foot surgery  1993    CARDIAC CATHETERIZATION  2013    x1    CATARACT EXTRACTION, BILATERAL      COLON SURGERY  2013    Ace Mott MD    COLONOSCOPY N/A 03/21/2018    Procedure: COLONOSCOPY;  Surgeon: Terry Mott MD;  Location: [address]);  Service: Endoscopy;  Laterality: N/A;    COLONOSCOPY N/A 04/12/2019    Procedure: COLONOSCOPY;  Surgeon: John Mantilla MD;  Location: [address]);  Service: Endoscopy;  Laterality: N/A;    COLONOSCOPY N/A 05/31/2022    Procedure: COLONOSCOPY;  Surgeon: MEDINA Farris MD;  Location: [address]);  Service: Endoscopy;  Laterality: N/A;  fully vacc-inst portal-tb    ENDOBRONCHIAL ULTRASOUND Bilateral 04/30/2024    Procedure: ENDOBRONCHIAL ULTRASOUND (EBUS);  Surgeon: Naveed Aguero MD;  Location: [facility] OR;  Service: Pulmonary;  Laterality: Bilateral;    EYE SURGERY  2011    scrotal abscess removal      TYMPANOSTOMY TUBE PLACEMENT  2017       Review of  patient's allergies indicates:   Allergen Reactions    Brilinta [ticagrelor] Itching    Lisinopril      Other reaction(s): cough    Metoprolol succinate Rash       Current Facility-Administered Medications on File Prior to Encounter   Medication    [ - Suspended Admission] acetaminophen tablet 650 mg    [ - Suspended Admission] albuterol nebulizer solution 0.6667 mg    [ - Suspended Admission] amitriptyline tablet 25 mg    [ - Suspended Admission] ammonium lactate 12 % lotion    [ - Suspended Admission] aspirin EC tablet 81 mg    [ - Suspended Admission] atorvastatin tablet 80 mg    [ - Suspended Admission] calcium carbonate 200 mg calcium (500 mg) chewable tablet 500 mg    dextrose 50% injection 12.5 g    dextrose 50% injection 25 g    [ - Suspended Admission] diltiaZEM tablet 120 mg    [ - Suspended Admission] enoxaparin injection 40 mg    [ - Suspended Admission] fluticasone propionate 50 mcg/actuation nasal spray 100 mcg    [ - Suspended Admission] furosemide tablet 40 mg    [ - Suspended Admission] guaiFENesin 12 hr tablet 1,200 mg    [ - Suspended Admission] hydrOXYzine HCL tablet 25 mg    insulin regular injection 0-8 Units    [ - Suspended Admission] levalbuterol nebulizer solution 1.2495 mg    [ - Suspended Admission] levETIRAcetam tablet 500 mg    [ - Suspended Admission] melatonin tablet 6 mg    [ - Suspended Admission] methocarbamoL tablet 500 mg    [ - Suspended Admission] mirtazapine tablet 15 mg    [] mupirocin 2 % ointment    [ - Suspended Admission] naloxone 0.4 mg/mL injection 0.02 mg    [ - Suspended Admission] nitroGLYCERIN SL tablet 0.4 mg    [ - Suspended Admission] ondansetron disintegrating tablet 4 mg    [ - Suspended Admission] oxyCODONE-acetaminophen 5-325 mg per tablet 1 tablet    [ - Suspended Admission] pantoprazole EC tablet 40 mg    [ - Suspended Admission] polyethylene glycol packet 17 g    [ - Suspended Admission] roflumilast (DALIRESP) tablet 500 mcg    [ - Suspended  Admission] sodium chloride 0.9% flush 10 mL    [ - Suspended Admission] sodium chloride 3% nebulizer solution 4 mL    [ - Suspended Admission] tiotropium bromide 2.5 mcg/actuation inhaler 2 puff     Current Outpatient Medications on File Prior to Encounter   Medication Sig    acetaminophen (TYLENOL) 500 MG tablet Take 500 mg by mouth 2 (two) times daily as needed for Pain.    albuterol (ACCUNEB) 0.63 mg/3 mL Nebu Inhale 3 mLs (0.63 mg total) by nebulization every 6 (six) hours as needed (if symptoms failed to improve with inhaler). Rescue    albuterol (PROVENTIL/VENTOLIN HFA) 90 mcg/actuation inhaler Inhale 2 puffs into the lungs every 4 (four) hours as needed for Wheezing.    amitriptyline (ELAVIL) 25 MG tablet Take 1 tablet (25 mg total) by mouth once daily. (Patient taking differently: Take 25 mg by mouth every evening.)    aspirin (ECOTRIN) 81 MG EC tablet Take 81 mg by mouth every morning.    atorvastatin (LIPITOR) 80 MG tablet Take 1 tablet (80 mg total) by mouth in the morning.    BETA-CAROTENE,A, W-C & E/MIN (OCUVITE ORAL) Take 1 capsule by mouth once daily.     budesonide-glycopyr-formoterol (BREZTRI AEROSPHERE) 160-9-4.8 mcg/actuation HFAA Inhale 2 puffs into the lungs 2 (two) times a day.    dexAMETHasone (DECADRON) 4 MG Tab Take 1 tablet (4 mg total) by mouth 2 (two) times daily. (Patient not taking: Reported on 5/16/2025)    echinacea 400 mg Cap Take 1 capsule by mouth once daily.     fluticasone propionate (FLONASE) 50 mcg/actuation nasal spray Spray 2 sprays (100 mcg total) in Each Nostril once daily.    furosemide (LASIX) 20 MG tablet Take 1 tablet (20 mg total) by mouth once daily.    ibuprofen (ADVIL,MOTRIN) 200 MG tablet Take 200 mg by mouth every 8 (eight) hours as needed for Pain.    latanoprost 0.005 % ophthalmic solution Instill 1 drop into both eyes every night at bedtime    levETIRAcetam (KEPPRA) 500 MG Tab Take 1 tablet (500 mg total) by mouth 2 (two) times daily.    losartan (COZAAR) 100  MG tablet Take 1 tablet (100 mg total) by mouth once daily.    nitroGLYCERIN (NITROSTAT) 0.4 MG SL tablet Place 1 tablet (0.4 mg total) under the tongue every 5 (five) minutes as needed.    omeprazole (PRILOSEC) 20 MG capsule Take 20 mg by mouth once daily.    polyethylene glycol (GLYCOLAX) 17 gram/dose powder Take 17 grams by mouth every day for constipation prevention    roflumilast (DALIRESP) 500 mcg Tab Take 500 mcg by mouth every morning.    sennosides (SENNA ORAL) Take 1 tablet by mouth daily as needed (Constipation).    traZODone (DESYREL) 50 MG tablet Take 1 tablet (50 mg total) by mouth every evening.     Family History       Problem Relation (Age of Onset)    Diabetes Maternal Grandmother    Heart attack Mother    Heart disease Mother    Hypertension Mother    No Known Problems Sister, Daughter          Tobacco Use    Smoking status: Former     Current packs/day: 0.00     Average packs/day: 1 pack/day for 40.0 years (40.0 ttl pk-yrs)     Types: Cigarettes     Start date: 8/15/1972     Quit date: 8/15/2012     Years since quittin.8     Passive exposure: Never    Smokeless tobacco: Never   Substance and Sexual Activity    Alcohol use: Yes     Alcohol/week: 3.0 standard drinks of alcohol     Types: 3 Cans of beer per week     Comment: maybe 2-3  beers weekly    Drug use: No    Sexual activity: Yes     Partners: Female     Review of Systems   Cardiovascular:  Negative for chest pain, dyspnea on exertion, irregular heartbeat, leg swelling, near-syncope and palpitations.   Respiratory:  Negative for cough, shortness of breath, sputum production and wheezing.    All other systems reviewed and are negative.    Objective:     Vital Signs (Most Recent):  Temp: 98.8 °F (37.1 °C) (06/10/25 1950)  Pulse: 84 (06/10/25 2016)  Resp: 18 (06/10/25 1950)  BP: 104/68 (06/10/25 2016)  SpO2: 99 % (06/10/25 1950) Vital Signs (24h Range):  Temp:  [97.3 °F (36.3 °C)-98.8 °F (37.1 °C)] 98.8 °F (37.1 °C)  Pulse:  []  84  Resp:  [16-22] 18  SpO2:  [89 %-99 %] 99 %  BP: ()/(56-79) 104/68     Weight: 86.3 kg (190 lb 4.1 oz)  Body mass index is 28.1 kg/m².    SpO2: 99 %         Intake/Output Summary (Last 24 hours) at 6/10/2025 2030  Last data filed at 6/10/2025 1804  Gross per 24 hour   Intake 1443.81 ml   Output 650 ml   Net 793.81 ml       Lines/Drains/Airways       Peripheral Intravenous Line  Duration                  Peripheral IV - Single Lumen 06/09/25 2000 20 G 1/2 in Left;Posterior Wrist 1 day         Peripheral IV - Single Lumen 06/10/25 1828 20 G 1/2 in Anterior;Right Wrist <1 day                     Physical Exam  Vitals reviewed.   Constitutional:       General: He is not in acute distress.  Eyes:      General: No scleral icterus.  Cardiovascular:      Rate and Rhythm: Normal rate. Rhythm irregular.      Heart sounds: Murmur heard.   Pulmonary:      Effort: Pulmonary effort is normal. No respiratory distress.      Breath sounds: Wheezing present.   Abdominal:      General: There is no distension.      Palpations: Abdomen is soft.      Tenderness: There is no abdominal tenderness.   Musculoskeletal:      Right lower leg: No edema.      Left lower leg: No edema.   Skin:     General: Skin is warm and dry.   Neurological:      Mental Status: He is alert and oriented to person, place, and time.   Psychiatric:         Mood and Affect: Mood normal.         Thought Content: Thought content normal.         Judgment: Judgment normal.          Significant Labs: ABG:   Recent Labs   Lab 06/09/25  0720   PH 7.499*   PCO2 56.3*   HCO3 43.8*   POCSATURATED 96   BE 21*   , CMP   Recent Labs   Lab 06/09/25  0553 06/09/25  0905 06/10/25  0330   * 135* 134*   K 3.6 3.5 4.0   CL 87* 88* 92*   CO2 32* 34* 29   GLU 98 109 150*   BUN 25* 26* 34*   CREATININE 0.9 0.9 0.9   CALCIUM 9.0 9.0 9.0   PROT 6.2 6.2 6.2   ALBUMIN 2.2* 2.3* 2.2*   BILITOT 0.8 0.7 0.6   ALKPHOS 129 124 143   AST 37 35 136*   ALT 63* 62* 125*   ANIONGAP 15 13  "13   , CBC   Recent Labs   Lab 06/09/25  0553 06/09/25  0905 06/10/25  0330   WBC 14.42* 15.55* 12.20   HGB 11.9* 11.9* 11.6*   HCT 36.9* 36.8* 36.6*   * 129* 130*   , INR No results for input(s): "INR", "PROTIME" in the last 48 hours., Lipid Panel No results for input(s): "CHOL", "HDL", "LDLCALC", "TRIG", "CHOLHDL" in the last 48 hours., Troponin   Recent Labs   Lab 06/09/25  0905 06/09/25  1415   TROPONINIHS 12 11   , and All pertinent lab results from the last 24 hours have been reviewed.    Significant Imaging: Echocardiogram: Transthoracic echo (TTE) complete (Cupid Only):   Results for orders placed or performed during the hospital encounter of 05/22/25   Echo   Result Value Ref Range    LV Diastolic Volume 77 mL    Echo EF Estimated 54 %    LV Systolic Volume 35 mL    IVS 0.9 0.6 - 1.1 cm    LVIDd 4.2 3.5 - 6.0 cm    LVIDs 3.0 2.1 - 4.0 cm    LVOT diameter 2.0 cm    PW 1.0 0.6 - 1.1 cm    AV LVOT peak gradient 5 mmHg    LVOT mn grad 3 mmHg    LVOT peak sonido 1.1 m/s    LVOT peak VTI 16.9 cm    RV- santiago basal diam 3.2 cm    LA size 3.9 cm    Left Atrium Major Axis 4.7 cm    Left Atrium Minor Axis 5.0 cm    LA Vol (MOD) 39 mL    RA Major Hammond 4.76 cm    AV valve area 2.5 cm2    AV area by cont VTI 2.6 cm2    AV peak gradient 9 mmHg    AV mean gradient 5 mmHg    Ao peak sonido 1.5 m/s    Ao VTI 21.2 cm    MV Peak A Sonido 0.97 m/s    E wave deceleration time 121 ms    MV Peak E Sonido 0.80 m/s    E/A ratio 0.82     LV LATERAL E/E' RATIO 11.4     LV SEPTAL E/E' RATIO 11.4     TDI LATERAL 0.07 m/s    TDI SEPTAL 0.07 m/s    Ascending aorta 2.7 cm    STJ 3.2 cm    Sinus 2.79 cm    LA WIDTH 3.2 cm    RA Width 3.02 cm    BSA 2.16 m2    LVOT stroke volume 53.1 cm3    LV Systolic Volume Index 16.5 mL/m2    LV Diastolic Volume Index 36.32 mL/m2    LVOT area 3.1 cm2    FS 28.6 28 - 44 %    Left Ventricle Relative Wall Thickness 0.48 cm    LV mass 127.8 g    LV Mass Index 60.3 g/m2    E/E' ratio 11 m/s    RAE 24 mL/m2    LA " Vol 51 cm3    RV/LV Ratio 0.76 cm    RAE (MOD) 18 mL/m2    AV Velocity Ratio 0.73     AV index (prosthetic) 0.80     SIN by Velocity Ratio 2.3 cm²    ASI 1.3 cm/m2    ASI 1.3 cm/m2    Mean e' 0.07 m/s    ZLVIDS -2.45     ZLVIDD -4.67     Est. RA pres 3 mmHg    Narrative      Tachycardia was present during the study    Erall the study quality was technically difficult. valves not well   visualized.    Left Ventricle: The left ventricle is normal in size. Normal wall   thickness. Normal wall motion. There is normal systolic function with a   visually estimated ejection fraction of 65 - 70%. There is normal   diastolic function. Normal left ventricular filling pressure.    Right Ventricle: The right ventricle is normal in size Wall thickness   is normal. Systolic function is normal.    Left Atrium: Left atrium was not well visualized.    Right Atrium: Not well visualized due to poor acoustic window.    Aortic Valve: Not well visualized due to poor acoustic window.    Mitral Valve: Not well visualized due to poor acoustic window.    Tricuspid Valve: Not well visualized due to poor acoustic window.    Pulmonic Valve: Not well visualized due to poor acoustic window.    Aorta: The aortic root is normal in size measuring 2.79 cm. The   ascending aorta is normal in size measuring 2.7 cm.    IVC/SVC: Normal venous pressure at 3 mmHg.    Pericardium: There is no pericardial effusion.       Assessment and Plan:     Atrial flutter with rapid ventricular response  Patient with mix of irregular and regular SVT concerning for MAT vs afib with suspected atrial Flutter with 2:1 conduction though cannot be certain based on ECG and telemetry. This has occurred in the setting of acute on chronic hypoxic respiratory failure in the setting of parainfluenza PNA.He has a recent normal TTE and an allergy to metoprolol. He converted to sinus rhythm with PACs after 2 pushes of IV diltiazem.    Recommendations:  - telemetry  - daily ECG  - K >  4, Mg > 2  - if able from respiratory perspective, would try to reduce albuterol use temporarily  - diltiazem 60mg q8 hrs, can increase to home 120mg q8 if tolerating  - recommend AC given CHADSVASc of 4 (additionally has cancer)  - outpatient follow up with Dr. Ba         VTE Risk Mitigation (From admission, onward)           Ordered     enoxaparin injection 90 mg  Every 12 hours         06/10/25 1726     Place sequential compression device  Until discontinued         06/10/25 1542                    Thank you for your consult. I will follow-up with patient. Please contact us if you have any additional questions.    Prince Gay MD  Cardiology   [facility]  I have personally taken the history and examined the patient and agree with the resident's note as stated above.  Diltiazem and NOAC RECOMMENDED .

## 2025-06-11 NOTE — DISCHARGE SUMMARY
[facility] - Cardiology [facility] Medicine  Discharge Summary      Patient Name: Jordin Allen Jr.  MRN: [id number]  Admission Date: 6/9/2025  Hospital Length of Stay: 2 days  Discharge Date and Time: 06/11/2025 11:48 AM  Attending Physician: Kenny Brush MD   Discharging Provider: Kenny Brush MD  Discharge Provider Team: [facility]  Primary Care Provider: Sierra Landry MD        HPI: Jordin Allen Jr. Is a 79 y.o.M with pmhx of COPD on chronic 4L NC, GERD, HTN, SHOLA, CAD, NAFLD, dyslipidemia, L lung cancer s/p chemo and radiation c/b radiation necrosis, off immunotherapy since 12/24 metastasis to brain s/p XRT, anxiety, who presents to [facility] from [facility] with acute onset need for increased oxygen associated with chest pain beginning last night. Patient has not felt this chest pain since the episode. Currently no chest pain. Does endorse SOB and cough. Reports he is not coughing up anything because he feels like it is stuck in his chest. Denies fever, chills, chest pain, palpitations,  abdominal pain, n/v/d, dysuria, headaches, or any other symptoms at this time.      In ED: AF, VSS on 6-8L HFNC. CBC with leukocytosis. Hgb 11.9. CMP notable for Na 135, mild elevation in ALT. Phos 2.4. BNP 62. HS trop 12. LA 0.9. covid/flu negative. ABG with pH 7.499, pCO2 56.3, pO2 79, HCO3 43.8. Blood cultures pending. CTA chest with no evidence of acute PE, mild ill-defined airspace opacities in the dependent portions of the right upper and middle lobes when compared to 05/22/2025, nonspecific.  This could be due to atelectasis, aspiration and/or pneumonia 5 mm left upper lobe nodule, unchanged from 05/22/2025 but new when compared to 03/05/2025.  A follow-up chest CT in 1 year could be considered if the patient is at increased risk of developing lung cancer, such as from a smoking history. S/p duoneb, dexamethasone 10mg inj, vanc and zosyn in ED. Admitted to  for further evaluation.     * No surgery found *     "  Hospital Course: Patient transferred from LTAC for worsening hypoxic respiratory failure. He was admitted to LTAC for weaning off oxygen. CT with findings of pneumonia. Parainfluenza positive. Antibiotics broadened to vancomycin and Zosyn, sputum cultures ordered, supportive care and treatment for COPD exacerbation. Patient reports symptoms have improved.  He was weaned down to 2 L of oxygen.  Nasal MRSA screen obtained, if negative can discontinue vancomycin.  He is to continue 2 more days of prednisone to complete a 5 day course for COPD.  Ideally would do Atrovent for nebs given his atrial tachycardia.  We will continue with Zosyn to complete a 10 day course.  Continue with supportive care, cough expectorant, chest PT nebs for pulmonary toileting.  Re his tachycardia, cardiology recommends continuing diltiazem 180 mg q.day and he was started on oral apixaban.  CTA negative for PE.  Patient was stable and discharged to LTAC.  Cardiology referral given. Echo reports no heart failure or significant valvular pathology.      Of note, he was placed on oral Lasix for his hypoxia.  Echo report seen, no heart failure or valvular disease. On presentation blood gas  Metabolic alkalosis with respiratory compensation noted, likely underlying history of chronic respiratory acidosis as well given his COPD.  No indication for lasix    Consults:   Consults (From admission, onward)          Status Ordering Provider     Inpatient consult to Cardiology  Once        Provider:  (Not yet assigned)    Completed FLORENCIA RICKS     Pharmacy to dose Vancomycin consult  Once        Provider:  (Not yet assigned)   Placed in "And" Linked Group    Acknowledged LEONA GARCIA            Final Active Diagnoses:    Diagnosis Date Noted POA    PRINCIPAL PROBLEM:  Pneumonia of right lung due to infectious organism [J18.9] 05/23/2025 Yes    Atrial flutter with rapid ventricular response [I48.92] 06/10/2025 No    Anxiety [F41.9] 08/01/2024 Yes    " Metastasis to brain [C79.31] 07/30/2024 Yes    Adenocarcinoma, lung, left [C34.92] 04/02/2024 Yes    Acute on chronic respiratory failure with hypoxia [J96.21] 12/04/2022 Yes    Dyslipidemia [E78.5] 11/07/2019 Yes    NAFLD (nonalcoholic fatty liver disease) [K76.0] 03/27/2017 Yes    COPD with acute exacerbation [J44.1]  Yes    CAD (coronary artery disease) [I25.10] 10/31/2013 Yes     Chronic    HTN (hypertension), benign [I10] 09/07/2012 Yes    SHOLA (obstructive sleep apnea) [G47.33] 09/07/2012 Yes    GERD (gastroesophageal reflux disease) [K21.9] 08/23/2012 Yes      Problems Resolved During this Admission:      Discharged Condition: fair    Disposition: Long Term Care    Follow Up:    Patient Instructions:      Ambulatory referral/consult to Cardiology   Standing Status: Future   Referral Priority: Routine Referral Type: Consultation   Referral Reason: Specialty Services Required   Referred to Provider: ADAIR AVERY Requested Specialty: Cardiology   Number of Visits Requested: 1     Medications:  Reconciled Home Medications:      Medication List        START taking these medications      0.9% NaCl SolP 250 mL with vancomycin 1,000 mg SolR 1,000 mg  Inject 1,000 mg into the vein every 12 (twelve) hours.     apixaban 5 mg Tab  Commonly known as: ELIQUIS  Take 1 tablet (5 mg total) by mouth 2 (two) times daily.     benzonatate 100 MG capsule  Commonly known as: TESSALON  Take 1 capsule (100 mg total) by mouth 3 (three) times daily as needed for Cough.     D5W PgBk 100 mL with piperacillin-tazobactam 4.5 gram SolR 4.5 g  Inject 4.5 g into the vein every 8 (eight) hours. for 7 days     * dextrose 50% injection  Inject 25 mLs (12.5 g total) into the vein as needed (between 50 - 70 mg/dL if patient cannnot take PO but has IV access.).     * dextrose 50% injection  Inject 50 mLs (25 g total) into the vein as needed (less than 50 mg/dL if patient cannnot take PO but has IV access.).     diltiaZEM 180 MG 24 hr  capsule  Commonly known as: CARDIZEM CD  Take 1 capsule (180 mg total) by mouth once daily.     * glucose 4 GM chewable tablet  Take 4 tablets (16 g total) by mouth as needed.     * glucose 4 GM chewable tablet  Take 6 tablets (24 g total) by mouth as needed.     guaiFENesin 600 mg 12 hr tablet  Commonly known as: MUCINEX  Take 1 tablet (600 mg total) by mouth 2 (two) times daily. for 10 days     ipratropium 0.02 % nebulizer solution  Commonly known as: ATROVENT  Take 2.5 mLs (500 mcg total) by nebulization every 6 (six) hours as needed for Wheezing. Rescue     predniSONE 50 MG Tab  Commonly known as: DELTASONE  Take 1 tablet (50 mg total) by mouth once daily. for 2 days  Start taking on: June 12, 2025           * This list has 4 medication(s) that are the same as other medications prescribed for you. Read the directions carefully, and ask your doctor or other care provider to review them with you.                CHANGE how you take these medications      amitriptyline 25 MG tablet  Commonly known as: ELAVIL  Take 1 tablet (25 mg total) by mouth once daily.  What changed: when to take this            CONTINUE taking these medications      acetaminophen 500 MG tablet  Commonly known as: TYLENOL  Take 500 mg by mouth 2 (two) times daily as needed for Pain.     aspirin 81 MG EC tablet  Commonly known as: ECOTRIN  Take 81 mg by mouth every morning.     atorvastatin 80 MG tablet  Commonly known as: LIPITOR  Take 1 tablet (80 mg total) by mouth in the morning.     BREZTRI AEROSPHERE 160-9-4.8 mcg/actuation aa  Generic drug: budesonide-glycopyr-formoterol  Inhale 2 puffs into the lungs 2 (two) times a day.     echinacea 400 mg Cap  Take 1 capsule by mouth once daily.     fluticasone propionate 50 mcg/actuation nasal spray  Commonly known as: FLONASE  Spray 2 sprays (100 mcg total) in Each Nostril once daily.     latanoprost 0.005 % ophthalmic solution  Instill 1 drop into both eyes every night at bedtime      levETIRAcetam 500 MG Tab  Commonly known as: KEPPRA  Take 1 tablet (500 mg total) by mouth 2 (two) times daily.     losartan 100 MG tablet  Commonly known as: COZAAR  Take 1 tablet (100 mg total) by mouth once daily.     OCUVITE ORAL  Take 1 capsule by mouth once daily.     omeprazole 20 MG capsule  Commonly known as: PRILOSEC  Take 20 mg by mouth once daily.     polyethylene glycol 17 gram/dose powder  Commonly known as: GLYCOLAX  Take 17 grams by mouth every day for constipation prevention     roflumilast 500 mcg Tab  Commonly known as: DALIRESP  Take 500 mcg by mouth every morning.     traZODone 50 MG tablet  Commonly known as: DESYREL  Take 1 tablet (50 mg total) by mouth every evening.            STOP taking these medications      albuterol 0.63 mg/3 mL Nebu  Commonly known as: ACCUNEB     albuterol 90 mcg/actuation inhaler  Commonly known as: PROVENTIL/VENTOLIN HFA     dexAMETHasone 4 MG Tab  Commonly known as: DECADRON     furosemide 20 MG tablet  Commonly known as: LASIX     ibuprofen 200 MG tablet  Commonly known as: ADVIL,MOTRIN     nitroGLYCERIN 0.4 MG SL tablet  Commonly known as: NITROSTAT     SENNA ORAL                Pending Diagnostic Studies:       None          Indwelling Lines/Drains at time of discharge:   Lines/Drains/Airways       None                   Time spent on the discharge of patient: 40 minutes         Kenny Brush MD  Department of Hospital Medicine  [facility] - Cardiology Stepdown       Fluid/Electrolyte/Metabolic

## 2025-06-11 NOTE — NURSING
Patient arrived to unit via stretcher, to room 212. Patient is alert and oriented, able to make all needs known to nursing staff.   Connected patient to telemetry, B/P soft, all other vitals stable, see flowsheet.   Patient arrived back to unit with cell phone and , one hearing aid in the left ear, and the hearing aid for the right ear is plugged into wall and charging.  Patient is resting quietly at this time, watching television. Nurse asked patient if he would like me to contact his family to notify them of his returning to room 212? Patient stated that he tried contacting his daughter this morning, but she left for [facility] this morning and did not answer.

## 2025-06-11 NOTE — NURSING
Pt discharged per MD orders. Report Called to [facility], report given to Vince LEONG,  Tele discontinued and returned to station.  Left/ Right IV remains in place . [facility] ambulance arrived to transfer patient via stretcher. All personal belongings packed in patient possession.     Minoxidil Counseling: Minoxidil is a topical medication which can increase blood flow where it is applied. It is uncertain how this medication increases hair growth. Side effects are uncommon and include stinging and allergic reactions. Rhofade Counseling: Rhofade is a topical medication which can decrease superficial blood flow where applied. Side effects are uncommon and include stinging, redness and allergic reactions. Valtrex Counseling: I discussed with the patient the risks of valacyclovir including but not limited to kidney damage, nausea, vomiting and severe allergy.  The patient understands that if the infection seems to be worsening or is not improving, they are to call. Niacinamide Pregnancy And Lactation Text: These medications are considered safe during pregnancy. Arava Pregnancy And Lactation Text: This medication is Pregnancy Category X and is absolutely contraindicated during pregnancy. It is unknown if it is excreted in breast milk. Topical Retinoid Pregnancy And Lactation Text: This medication is Pregnancy Category C. It is unknown if this medication is excreted in breast milk. Bexarotene Counseling:  I discussed with the patient the risks of bexarotene including but not limited to hair loss, dry lips/skin/eyes, liver abnormalities, hyperlipidemia, pancreatitis, depression/suicidal ideation, photosensitivity, drug rash/allergic reactions, hypothyroidism, anemia, leukopenia, infection, cataracts, and teratogenicity.  Patient understands that they will need regular blood tests to check lipid profile, liver function tests, white blood cell count, thyroid function tests and pregnancy test if applicable. Siliq Pregnancy And Lactation Text: The risk during pregnancy and breastfeeding is uncertain with this medication. Erythromycin Pregnancy And Lactation Text: This medication is Pregnancy Category B and is considered safe during pregnancy. It is also excreted in breast milk. Finasteride Pregnancy And Lactation Text: This medication is absolutely contraindicated during pregnancy. It is unknown if it is excreted in breast milk. Imiquimod Counseling:  I discussed with the patient the risks of imiquimod including but not limited to erythema, scaling, itching, weeping, crusting, and pain.  Patient understands that the inflammatory response to imiquimod is variable from person to person and was educated regarded proper titration schedule.  If flu-like symptoms develop, patient knows to discontinue the medication and contact us. Minocycline Pregnancy And Lactation Text: This medication is Pregnancy Category D and not consider safe during pregnancy. It is also excreted in breast milk. Sski Pregnancy And Lactation Text: This medication is Pregnancy Category D and isn't considered safe during pregnancy. It is excreted in breast milk. Itraconazole Counseling:  I discussed with the patient the risks of itraconazole including but not limited to liver damage, nausea/vomiting, neuropathy, and severe allergy.  The patient understands that this medication is best absorbed when taken with acidic beverages such as non-diet cola or ginger ale.  The patient understands that monitoring is required including baseline LFTs and repeat LFTs at intervals.  The patient understands that they are to contact us or the primary physician if concerning signs are noted. Opioid Counseling: I discussed with the patient the potential side effects of opioids including but not limited to addiction, altered mental status, and depression. I stressed avoiding alcohol, benzodiazepines, muscle relaxants and sleep aids unless specifically okayed by a physician. The patient verbalized understanding of the proper use and possible adverse effects of opioids. All of the patient's questions and concerns were addressed. They were instructed to flush the remaining pills down the toilet if they did not need them for pain. Skyrizi Counseling: I discussed with the patient the risks of risankizumab-rzaa including but not limited to immunosuppression, and serious infections.  The patient understands that monitoring is required including a PPD at baseline and must alert us or the primary physician if symptoms of infection or other concerning signs are noted. Ketoconazole Pregnancy And Lactation Text: This medication is Pregnancy Category C and it isn't know if it is safe during pregnancy. It is also excreted in breast milk and breast feeding isn't recommended. Fluconazole Counseling:  Patient counseled regarding adverse effects of fluconazole including but not limited to headache, diarrhea, nausea, upset stomach, liver function test abnormalities, taste disturbance, and stomach pain.  There is a rare possibility of liver failure that can occur when taking fluconazole.  The patient understands that monitoring of LFTs and kidney function test may be required, especially at baseline. The patient verbalized understanding of the proper use and possible adverse effects of fluconazole.  All of the patient's questions and concerns were addressed. Carac Counseling:  I discussed with the patient the risks of Carac including but not limited to erythema, scaling, itching, weeping, crusting, and pain. Enbrel Pregnancy And Lactation Text: This medication is Pregnancy Category B and is considered safe during pregnancy. It is unknown if this medication is excreted in breast milk. Azithromycin Counseling:  I discussed with the patient the risks of azithromycin including but not limited to GI upset, allergic reaction, drug rash, diarrhea, and yeast infections. Dapsone Counseling: I discussed with the patient the risks of dapsone including but not limited to hemolytic anemia, agranulocytosis, rashes, methemoglobinemia, kidney failure, peripheral neuropathy, headaches, GI upset, and liver toxicity.  Patients who start dapsone require monitoring including baseline LFTs and weekly CBCs for the first month, then every month thereafter.  The patient verbalized understanding of the proper use and possible adverse effects of dapsone.  All of the patient's questions and concerns were addressed. Clindamycin Pregnancy And Lactation Text: This medication can be used in pregnancy if certain situations. Clindamycin is also present in breast milk. Azithromycin Pregnancy And Lactation Text: This medication is considered safe during pregnancy and is also secreted in breast milk. Humira Counseling:  I discussed with the patient the risks of adalimumab including but not limited to myelosuppression, immunosuppression, autoimmune hepatitis, demyelinating diseases, lymphoma, and serious infections.  The patient understands that monitoring is required including a PPD at baseline and must alert us or the primary physician if symptoms of infection or other concerning signs are noted. Elidel Counseling: Patient may experience a mild burning sensation during topical application. Elidel is not approved in children less than 2 years of age. There have been case reports of hematologic and skin malignancies in patients using topical calcineurin inhibitors although causality is questionable. Bactrim Pregnancy And Lactation Text: This medication is Pregnancy Category D and is known to cause fetal risk.  It is also excreted in breast milk. Protopic Pregnancy And Lactation Text: This medication is Pregnancy Category C. It is unknown if this medication is excreted in breast milk when applied topically. Minocycline Counseling: Patient advised regarding possible photosensitivity and discoloration of the teeth, skin, lips, tongue and gums.  Patient instructed to avoid sunlight, if possible.  When exposed to sunlight, patients should wear protective clothing, sunglasses, and sunscreen.  The patient was instructed to call the office immediately if the following severe adverse effects occur:  hearing changes, easy bruising/bleeding, severe headache, or vision changes.  The patient verbalized understanding of the proper use and possible adverse effects of minocycline.  All of the patient's questions and concerns were addressed. Fluconazole Pregnancy And Lactation Text: This medication is Pregnancy Category C and it isn't know if it is safe during pregnancy. It is also excreted in breast milk. Metronidazole Counseling:  I discussed with the patient the risks of metronidazole including but not limited to seizures, nausea/vomiting, a metallic taste in the mouth, nausea/vomiting and severe allergy. Benzoyl Peroxide Pregnancy And Lactation Text: This medication is Pregnancy Category C. It is unknown if benzoyl peroxide is excreted in breast milk. Ketoconazole Counseling:   Patient counseled regarding improving absorption with orange juice.  Adverse effects include but are not limited to breast enlargement, headache, diarrhea, nausea, upset stomach, liver function test abnormalities, taste disturbance, and stomach pain.  There is a rare possibility of liver failure that can occur when taking ketoconazole. The patient understands that monitoring of LFTs may be required, especially at baseline. The patient verbalized understanding of the proper use and possible adverse effects of ketoconazole.  All of the patient's questions and concerns were addressed. Cellcept Pregnancy And Lactation Text: This medication is Pregnancy Category D and isn't considered safe during pregnancy. It is unknown if this medication is excreted in breast milk. Cyclophosphamide Counseling:  I discussed with the patient the risks of cyclophosphamide including but not limited to hair loss, hormonal abnormalities, decreased fertility, abdominal pain, diarrhea, nausea and vomiting, bone marrow suppression and infection. The patient understands that monitoring is required while taking this medication. Mirvaso Pregnancy And Lactation Text: This medication has not been assigned a Pregnancy Risk Category. It is unknown if the medication is excreted in breast milk. Topical Sulfur Applications Pregnancy And Lactation Text: This medication is Pregnancy Category C and has an unknown safety profile during pregnancy. It is unknown if this topical medication is excreted in breast milk. Propranolol Pregnancy And Lactation Text: This medication is Pregnancy Category C and it isn't known if it is safe during pregnancy. It is excreted in breast milk. Xelanaliliaz Pregnancy And Lactation Text: This medication is Pregnancy Category D and is not considered safe during pregnancy.  The risk during breast feeding is also uncertain. 5-Fu Counseling: 5-Fluorouracil Counseling:  I discussed with the patient the risks of 5-fluorouracil including but not limited to erythema, scaling, itching, weeping, crusting, and pain. Isotretinoin Pregnancy And Lactation Text: This medication is Pregnancy Category X and is considered extremely dangerous during pregnancy. It is unknown if it is excreted in breast milk. Hydroquinone Counseling:  Patient advised that medication may result in skin irritation, lightening (hypopigmentation), dryness, and burning.  In the event of skin irritation, the patient was advised to reduce the amount of the drug applied or use it less frequently.  Rarely, spots that are treated with hydroquinone can become darker (pseudoochronosis).  Should this occur, patient instructed to stop medication and call the office. The patient verbalized understanding of the proper use and possible adverse effects of hydroquinone.  All of the patient's questions and concerns were addressed. Otezla Pregnancy And Lactation Text: This medication is Pregnancy Category C and it isn't known if it is safe during pregnancy. It is unknown if it is excreted in breast milk. Eucrisa Pregnancy And Lactation Text: This medication has not been assigned a Pregnancy Risk Category but animal studies failed to show danger with the topical medication. It is unknown if the medication is excreted in breast milk. Cimzia Counseling:  I discussed with the patient the risks of Cimzia including but not limited to immunosuppression, allergic reactions and infections.  The patient understands that monitoring is required including a PPD at baseline and must alert us or the primary physician if symptoms of infection or other concerning signs are noted. Use Enhanced Medication Counseling?: No Opioid Pregnancy And Lactation Text: These medications can lead to premature delivery and should be avoided during pregnancy. These medications are also present in breast milk in small amounts. Ivermectin Counseling:  Patient instructed to take medication on an empty stomach with a full glass of water.  Patient informed of potential adverse effects including but not limited to nausea, diarrhea, dizziness, itching, and swelling of the extremities or lymph nodes.  The patient verbalized understanding of the proper use and possible adverse effects of ivermectin.  All of the patient's questions and concerns were addressed. Topical Sulfur Applications Counseling: Topical Sulfur Counseling: Patient counseled that this medication may cause skin irritation or allergic reactions.  In the event of skin irritation, the patient was advised to reduce the amount of the drug applied or use it less frequently.   The patient verbalized understanding of the proper use and possible adverse effects of topical sulfur application.  All of the patient's questions and concerns were addressed. Birth Control Pills Counseling: Birth Control Pill Counseling: I discussed with the patient the potential side effects of OCPs including but not limited to increased risk of stroke, heart attack, thrombophlebitis, deep venous thrombosis, hepatic adenomas, breast changes, GI upset, headaches, and depression.  The patient verbalized understanding of the proper use and possible adverse effects of OCPs. All of the patient's questions and concerns were addressed. Azelaic Acid Pregnancy And Lactation Text: This medication is considered safe during pregnancy and breast feeding. Topical Ketoconazole Pregnancy And Lactation Text: This medication is Pregnancy Category B and is considered safe during pregnancy. It is unknown if it is excreted in breast milk. Rifampin Pregnancy And Lactation Text: This medication is Pregnancy Category C and it isn't know if it is safe during pregnancy. It is also excreted in breast milk and should not be used if you are breast feeding. Winlevi Pregnancy And Lactation Text: This medication is considered safe during pregnancy and breastfeeding. Libtayo Counseling- I discussed with the patient the risks of Libtayo including but not limited to nausea, vomiting, diarrhea, and bone or muscle pain.  The patient verbalized understanding of the proper use and possible adverse effects of Libtayo.  All of the patient's questions and concerns were addressed. Griseofulvin Pregnancy And Lactation Text: This medication is Pregnancy Category X and is known to cause serious birth defects. It is unknown if this medication is excreted in breast milk but breast feeding should be avoided. Erythromycin Counseling:  I discussed with the patient the risks of erythromycin including but not limited to GI upset, allergic reaction, drug rash, diarrhea, increase in liver enzymes, and yeast infections. Wartpeel Counseling:  I discussed with the patient the risks of Wartpeel including but not limited to erythema, scaling, itching, weeping, crusting, and pain. Azathioprine Counseling:  I discussed with the patient the risks of azathioprine including but not limited to myelosuppression, immunosuppression, hepatotoxicity, lymphoma, and infections.  The patient understands that monitoring is required including baseline LFTs, Creatinine, possible TPMP genotyping and weekly CBCs for the first month and then every 2 weeks thereafter.  The patient verbalized understanding of the proper use and possible adverse effects of azathioprine.  All of the patient's questions and concerns were addressed. Sarecycline Counseling: Patient advised regarding possible photosensitivity and discoloration of the teeth, skin, lips, tongue and gums.  Patient instructed to avoid sunlight, if possible.  When exposed to sunlight, patients should wear protective clothing, sunglasses, and sunscreen.  The patient was instructed to call the office immediately if the following severe adverse effects occur:  hearing changes, easy bruising/bleeding, severe headache, or vision changes.  The patient verbalized understanding of the proper use and possible adverse effects of sarecycline.  All of the patient's questions and concerns were addressed. Thalidomide Counseling: I discussed with the patient the risks of thalidomide including but not limited to birth defects, anxiety, weakness, chest pain, dizziness, cough and severe allergy. Doxycycline Pregnancy And Lactation Text: This medication is Pregnancy Category D and not consider safe during pregnancy. It is also excreted in breast milk but is considered safe for shorter treatment courses. Doxepin Pregnancy And Lactation Text: This medication is Pregnancy Category C and it isn't known if it is safe during pregnancy. It is also excreted in breast milk and breast feeding isn't recommended. Valtrex Pregnancy And Lactation Text: this medication is Pregnancy Category B and is considered safe during pregnancy. This medication is not directly found in breast milk but it's metabolite acyclovir is present. Quinolones Counseling:  I discussed with the patient the risks of fluoroquinolones including but not limited to GI upset, allergic reaction, drug rash, diarrhea, dizziness, photosensitivity, yeast infections, liver function test abnormalities, tendonitis/tendon rupture. Dupixent Counseling: I discussed with the patient the risks of dupilumab including but not limited to eye infection and irritation, cold sores, injection site reactions, worsening of asthma, allergic reactions and increased risk of parasitic infection.  Live vaccines should be avoided while taking dupilumab. Dupilumab will also interact with certain medications such as warfarin and cyclosporine. The patient understands that monitoring is required and they must alert us or the primary physician if symptoms of infection or other concerning signs are noted. Colchicine Counseling:  Patient counseled regarding adverse effects including but not limited to stomach upset (nausea, vomiting, stomach pain, or diarrhea).  Patient instructed to limit alcohol consumption while taking this medication.  Colchicine may reduce blood counts especially with prolonged use.  The patient understands that monitoring of kidney function and blood counts may be required, especially at baseline. The patient verbalized understanding of the proper use and possible adverse effects of colchicine.  All of the patient's questions and concerns were addressed. Gabapentin Pregnancy And Lactation Text: This medication is Pregnancy Category C and isn't considered safe during pregnancy. It is excreted in breast milk. Tremfya Counseling: I discussed with the patient the risks of guselkumab including but not limited to immunosuppression, serious infections, worsening of inflammatory bowel disease and drug reactions.  The patient understands that monitoring is required including a PPD at baseline and must alert us or the primary physician if symptoms of infection or other concerning signs are noted. Glycopyrrolate Counseling:  I discussed with the patient the risks of glycopyrrolate including but not limited to skin rash, drowsiness, dry mouth, difficulty urinating, and blurred vision. Tetracycline Counseling: Patient counseled regarding possible photosensitivity and increased risk for sunburn.  Patient instructed to avoid sunlight, if possible.  When exposed to sunlight, patients should wear protective clothing, sunglasses, and sunscreen.  The patient was instructed to call the office immediately if the following severe adverse effects occur:  hearing changes, easy bruising/bleeding, severe headache, or vision changes.  The patient verbalized understanding of the proper use and possible adverse effects of tetracycline.  All of the patient's questions and concerns were addressed. Patient understands to avoid pregnancy while on therapy due to potential birth defects. Otezla Counseling: The side effects of Otezla were discussed with the patient, including but not limited to worsening or new depression, weight loss, diarrhea, nausea, upper respiratory tract infection, and headache. Patient instructed to call the office should any adverse effect occur.  The patient verbalized understanding of the proper use and possible adverse effects of Otezla.  All the patient's questions and concerns were addressed. Mirvaso Counseling: Mirvaso is a topical medication which can decrease superficial blood flow where applied. Side effects are uncommon and include stinging, redness and allergic reactions. Prednisone Counseling:  I discussed with the patient the risks of prolonged use of prednisone including but not limited to weight gain, insomnia, osteoporosis, mood changes, diabetes, susceptibility to infection, glaucoma and high blood pressure.  In cases where prednisone use is prolonged, patients should be monitored with blood pressure checks, serum glucose levels and an eye exam.  Additionally, the patient may need to be placed on GI prophylaxis, PCP prophylaxis, and calcium and vitamin D supplementation and/or a bisphosphonate.  The patient verbalized understanding of the proper use and the possible adverse effects of prednisone.  All of the patient's questions and concerns were addressed. Ivermectin Pregnancy And Lactation Text: This medication is Pregnancy Category C and it isn't known if it is safe during pregnancy. It is also excreted in breast milk. Zyclara Counseling:  I discussed with the patient the risks of imiquimod including but not limited to erythema, scaling, itching, weeping, crusting, and pain.  Patient understands that the inflammatory response to imiquimod is variable from person to person and was educated regarded proper titration schedule.  If flu-like symptoms develop, patient knows to discontinue the medication and contact us. Cosentyx Counseling:  I discussed with the patient the risks of Cosentyx including but not limited to worsening of Crohn's disease, immunosuppression, allergic reactions and infections.  The patient understands that monitoring is required including a PPD at baseline and must alert us or the primary physician if symptoms of infection or other concerning signs are noted. Benzoyl Peroxide Counseling: Patient counseled that medicine may cause skin irritation and bleach clothing.  In the event of skin irritation, the patient was advised to reduce the amount of the drug applied or use it less frequently.   The patient verbalized understanding of the proper use and possible adverse effects of benzoyl peroxide.  All of the patient's questions and concerns were addressed. Doxepin Counseling:  Patient advised that the medication is sedating and not to drive a car after taking this medication. Patient informed of potential adverse effects including but not limited to dry mouth, urinary retention, and blurry vision.  The patient verbalized understanding of the proper use and possible adverse effects of doxepin.  All of the patient's questions and concerns were addressed. Siliq Counseling:  I discussed with the patient the risks of Siliq including but not limited to new or worsening depression, suicidal thoughts and behavior, immunosuppression, malignancy, posterior leukoencephalopathy syndrome, and serious infections.  The patient understands that monitoring is required including a PPD at baseline and must alert us or the primary physician if symptoms of infection or other concerning signs are noted. There is also a special program designed to monitor depression which is required with Siliq. Doxycycline Counseling:  Patient counseled regarding possible photosensitivity and increased risk for sunburn.  Patient instructed to avoid sunlight, if possible.  When exposed to sunlight, patients should wear protective clothing, sunglasses, and sunscreen.  The patient was instructed to call the office immediately if the following severe adverse effects occur:  hearing changes, easy bruising/bleeding, severe headache, or vision changes.  The patient verbalized understanding of the proper use and possible adverse effects of doxycycline.  All of the patient's questions and concerns were addressed. Detail Level: Detailed Solaraze Counseling:  I discussed with the patient the risks of Solaraze including but not limited to erythema, scaling, itching, weeping, crusting, and pain. Infliximab Counseling:  I discussed with the patient the risks of infliximab including but not limited to myelosuppression, immunosuppression, autoimmune hepatitis, demyelinating diseases, lymphoma, and serious infections.  The patient understands that monitoring is required including a PPD at baseline and must alert us or the primary physician if symptoms of infection or other concerning signs are noted. Ilumya Counseling: I discussed with the patient the risks of tildrakizumab including but not limited to immunosuppression, malignancy, posterior leukoencephalopathy syndrome, and serious infections.  The patient understands that monitoring is required including a PPD at baseline and must alert us or the primary physician if symptoms of infection or other concerning signs are noted. Tazorac Pregnancy And Lactation Text: This medication is not safe during pregnancy. It is unknown if this medication is excreted in breast milk. Tazorac Counseling:  Patient advised that medication is irritating and drying.  Patient may need to apply sparingly and wash off after an hour before eventually leaving it on overnight.  The patient verbalized understanding of the proper use and possible adverse effects of tazorac.  All of the patient's questions and concerns were addressed. Cimetidine Counseling:  I discussed with the patient the risks of Cimetidine including but not limited to gynecomastia, headache, diarrhea, nausea, drowsiness, arrhythmias, pancreatitis, skin rashes, psychosis, bone marrow suppression and kidney toxicity. Dutasteride Male Counseling: Dustasteride Counseling:  I discussed with the patient the risks of use of dutasteride including but not limited to decreased libido, decreased ejaculate volume, and gynecomastia. Women who can become pregnant should not handle medication.  All of the patient's questions and concerns were addressed. Terbinafine Counseling: Patient counseling regarding adverse effects of terbinafine including but not limited to headache, diarrhea, rash, upset stomach, liver function test abnormalities, itching, taste/smell disturbance, nausea, abdominal pain, and flatulence.  There is a rare possibility of liver failure that can occur when taking terbinafine.  The patient understands that a baseline LFT and kidney function test may be required. The patient verbalized understanding of the proper use and possible adverse effects of terbinafine.  All of the patient's questions and concerns were addressed. Carac Pregnancy And Lactation Text: This medication is Pregnancy Category X and contraindicated in pregnancy and in women who may become pregnant. It is unknown if this medication is excreted in breast milk. Nsaids Pregnancy And Lactation Text: These medications are considered safe up to 30 weeks gestation. It is excreted in breast milk. Eucrisa Counseling: Patient may experience a mild burning sensation during topical application. Eucrisa is not approved in children less than 2 years of age. Nsaids Counseling: NSAID Counseling: I discussed with the patient that NSAIDs should be taken with food. Prolonged use of NSAIDs can result in the development of stomach ulcers.  Patient advised to stop taking NSAIDs if abdominal pain occurs.  The patient verbalized understanding of the proper use and possible adverse effects of NSAIDs.  All of the patient's questions and concerns were addressed. Cimzia Pregnancy And Lactation Text: This medication crosses the placenta but can be considered safe in certain situations. Cimzia may be excreted in breast milk. Libtayo Pregnancy And Lactation Text: This medication is contraindicated in pregnancy and when breast feeding. Odomzo Counseling- I discussed with the patient the risks of Odomzo including but not limited to nausea, vomiting, diarrhea, constipation, weight loss, changes in the sense of taste, decreased appetite, muscle spasms, and hair loss.  The patient verbalized understanding of the proper use and possible adverse effects of Odomzo.  All of the patient's questions and concerns were addressed. Propranolol Counseling:  I discussed with the patient the risks of propranolol including but not limited to low heart rate, low blood pressure, low blood sugar, restlessness and increased cold sensitivity. They should call the office if they experience any of these side effects. Acitretin Pregnancy And Lactation Text: This medication is Pregnancy Category X and should not be given to women who are pregnant or may become pregnant in the future. This medication is excreted in breast milk. Cyclosporine Pregnancy And Lactation Text: This medication is Pregnancy Category C and it isn't know if it is safe during pregnancy. This medication is excreted in breast milk. Winlevi Counseling:  I discussed with the patient the risks of topical clascoterone including but not limited to erythema, scaling, itching, and stinging. Patient voiced their understanding. Isotretinoin Counseling: Patient should get monthly blood tests, not donate blood, not drive at night if vision affected, not share medication, and not undergo elective surgery for 6 months after tx completed. Side effects reviewed, pt to contact office should one occur. Solaraze Pregnancy And Lactation Text: This medication is Pregnancy Category B and is considered safe. There is some data to suggest avoiding during the third trimester. It is unknown if this medication is excreted in breast milk. Dapsone Pregnancy And Lactation Text: This medication is Pregnancy Category C and is not considered safe during pregnancy or breast feeding. Topical Ketoconazole Counseling: Patient counseled that this medication may cause skin irritation or allergic reactions.  In the event of skin irritation, the patient was advised to reduce the amount of the drug applied or use it less frequently.   The patient verbalized understanding of the proper use and possible adverse effects of ketoconazole.  All of the patient's questions and concerns were addressed. High Dose Vitamin A Pregnancy And Lactation Text: High dose vitamin A therapy is contraindicated during pregnancy and breast feeding. High Dose Vitamin A Counseling: Side effects reviewed, pt to contact office should one occur. Rifampin Counseling: I discussed with the patient the risks of rifampin including but not limited to liver damage, kidney damage, red-orange body fluids, nausea/vomiting and severe allergy. Dupixent Pregnancy And Lactation Text: This medication likely crosses the placenta but the risk for the fetus is uncertain. This medication is excreted in breast milk. Methotrexate Pregnancy And Lactation Text: This medication is Pregnancy Category X and is known to cause fetal harm. This medication is excreted in breast milk. Topical Retinoid counseling:  Patient advised to apply a pea-sized amount only at bedtime and wait 30 minutes after washing their face before applying.  If too drying, patient may add a non-comedogenic moisturizer. The patient verbalized understanding of the proper use and possible adverse effects of retinoids.  All of the patient's questions and concerns were addressed. Cephalexin Counseling: I counseled the patient regarding use of cephalexin as an antibiotic for prophylactic and/or therapeutic purposes. Cephalexin (commonly prescribed under brand name Keflex) is a cephalosporin antibiotic which is active against numerous classes of bacteria, including most skin bacteria. Side effects may include nausea, diarrhea, gastrointestinal upset, rash, hives, yeast infections, and in rare cases, hepatitis, kidney disease, seizures, fever, confusion, neurologic symptoms, and others. Patients with severe allergies to penicillin medications are cautioned that there is about a 10% incidence of cross-reactivity with cephalosporins. When possible, patients with penicillin allergies should use alternatives to cephalosporins for antibiotic therapy. Acitretin Counseling:  I discussed with the patient the risks of acitretin including but not limited to hair loss, dry lips/skin/eyes, liver damage, hyperlipidemia, depression/suicidal ideation, photosensitivity.  Serious rare side effects can include but are not limited to pancreatitis, pseudotumor cerebri, bony changes, clot formation/stroke/heart attack.  Patient understands that alcohol is contraindicated since it can result in liver toxicity and significantly prolong the elimination of the drug by many years. Calcipotriene Counseling:  I discussed with the patient the risks of calcipotriene including but not limited to erythema, scaling, itching, and irritation. Niacinamide Counseling: I recommended taking niacin or niacinamide, also know as vitamin B3, twice daily. Recent evidence suggests that taking vitamin B3 (500 mg twice daily) can reduce the risk of actinic keratoses and non-melanoma skin cancers. Side effects of vitamin B3 include flushing and headache. Spironolactone Pregnancy And Lactation Text: This medication can cause feminization of the male fetus and should be avoided during pregnancy. The active metabolite is also found in breast milk. Tranexamic Acid Counseling:  Patient advised of the small risk of bleeding problems with tranexamic acid. They were also instructed to call if they developed any nausea, vomiting or diarrhea. All of the patient's questions and concerns were addressed. Drysol Counseling:  I discussed with the patient the risks of drysol/aluminum chloride including but not limited to skin rash, itching, irritation, burning. Clofazimine Counseling:  I discussed with the patient the risks of clofazimine including but not limited to skin and eye pigmentation, liver damage, nausea/vomiting, gastrointestinal bleeding and allergy. Birth Control Pills Pregnancy And Lactation Text: This medication should be avoided if pregnant and for the first 30 days post-partum. Cephalexin Pregnancy And Lactation Text: This medication is Pregnancy Category B and considered safe during pregnancy.  It is also excreted in breast milk but can be used safely for shorter doses. Bactrim Counseling:  I discussed with the patient the risks of sulfa antibiotics including but not limited to GI upset, allergic reaction, drug rash, diarrhea, dizziness, photosensitivity, and yeast infections.  Rarely, more serious reactions can occur including but not limited to aplastic anemia, agranulocytosis, methemoglobinemia, blood dyscrasias, liver or kidney failure, lung infiltrates or desquamative/blistering drug rashes. Methotrexate Counseling:  Patient counseled regarding adverse effects of methotrexate including but not limited to nausea, vomiting, abnormalities in liver function tests. Patients may develop mouth sores, rash, diarrhea, and abnormalities in blood counts. The patient understands that monitoring is required including LFT's and blood counts.  There is a rare possibility of scarring of the liver and lung problems that can occur when taking methotrexate. Persistent nausea, loss of appetite, pale stools, dark urine, cough, and shortness of breath should be reported immediately. Patient advised to discontinue methotrexate treatment at least three months before attempting to become pregnant.  I discussed the need for folate supplements while taking methotrexate.  These supplements can decrease side effects during methotrexate treatment. The patient verbalized understanding of the proper use and possible adverse effects of methotrexate.  All of the patient's questions and concerns were addressed. Topical Clindamycin Counseling: Patient counseled that this medication may cause skin irritation or allergic reactions.  In the event of skin irritation, the patient was advised to reduce the amount of the drug applied or use it less frequently.   The patient verbalized understanding of the proper use and possible adverse effects of clindamycin.  All of the patient's questions and concerns were addressed. Xeljanz Counseling: I discussed with the patient the risks of Xeljanz therapy including increased risk of infection, liver issues, headache, diarrhea, or cold symptoms. Live vaccines should be avoided. They were instructed to call if they have any problems. Finasteride Male Counseling: Finasteride Counseling:  I discussed with the patient the risks of use of finasteride including but not limited to decreased libido, decreased ejaculate volume, gynecomastia, and depression. Women should not handle medication.  All of the patient's questions and concerns were addressed. Terbinafine Pregnancy And Lactation Text: This medication is Pregnancy Category B and is considered safe during pregnancy. It is also excreted in breast milk and breast feeding isn't recommended. Protopic Counseling: Patient may experience a mild burning sensation during topical application. Protopic is not approved in children less than 2 years of age. There have been case reports of hematologic and skin malignancies in patients using topical calcineurin inhibitors although causality is questionable. Xolair Pregnancy And Lactation Text: This medication is Pregnancy Category B and is considered safe during pregnancy. This medication is excreted in breast milk. Arava Counseling:  Patient counseled regarding adverse effects of Arava including but not limited to nausea, vomiting, abnormalities in liver function tests. Patients may develop mouth sores, rash, diarrhea, and abnormalities in blood counts. The patient understands that monitoring is required including LFTs and blood counts.  There is a rare possibility of scarring of the liver and lung problems that can occur when taking methotrexate. Persistent nausea, loss of appetite, pale stools, dark urine, cough, and shortness of breath should be reported immediately. Patient advised to discontinue Arava treatment and consult with a physician prior to attempting conception. The patient will have to undergo a treatment to eliminate Arava from the body prior to conception. Picato Counseling:  I discussed with the patient the risks of Picato including but not limited to erythema, scaling, itching, weeping, crusting, and pain. Hydroxychloroquine Counseling:  I discussed with the patient that a baseline ophthalmologic exam is needed at the start of therapy and every year thereafter while on therapy. A CBC may also be warranted for monitoring.  The side effects of this medication were discussed with the patient, including but not limited to agranulocytosis, aplastic anemia, seizures, rashes, retinopathy, and liver toxicity. Patient instructed to call the office should any adverse effect occur.  The patient verbalized understanding of the proper use and possible adverse effects of Plaquenil.  All the patient's questions and concerns were addressed. Oral Minoxidil Pregnancy And Lactation Text: This medication should only be used when clearly needed if you are pregnant, attempting to become pregnant or breast feeding. Cyclosporine Counseling:  I discussed with the patient the risks of cyclosporine including but not limited to hypertension, gingival hyperplasia,myelosuppression, immunosuppression, liver damage, kidney damage, neurotoxicity, lymphoma, and serious infections. The patient understands that monitoring is required including baseline blood pressure, CBC, CMP, lipid panel and uric acid, and then 1-2 times monthly CMP and blood pressure. Glycopyrrolate Pregnancy And Lactation Text: This medication is Pregnancy Category B and is considered safe during pregnancy. It is unknown if it is excreted breast milk. Clindamycin Counseling: I counseled the patient regarding use of clindamycin as an antibiotic for prophylactic and/or therapeutic purposes. Clindamycin is active against numerous classes of bacteria, including skin bacteria. Side effects may include nausea, diarrhea, gastrointestinal upset, rash, hives, yeast infections, and in rare cases, colitis. Stelara Counseling:  I discussed with the patient the risks of ustekinumab including but not limited to immunosuppression, malignancy, posterior leukoencephalopathy syndrome, and serious infections.  The patient understands that monitoring is required including a PPD at baseline and must alert us or the primary physician if symptoms of infection or other concerning signs are noted. Calcipotriene Pregnancy And Lactation Text: This medication has not been proven safe during pregnancy. It is unknown if this medication is excreted in breast milk. Bexarotene Pregnancy And Lactation Text: This medication is Pregnancy Category X and should not be given to women who are pregnant or may become pregnant. This medication should not be used if you are breast feeding. Dutasteride Pregnancy And Lactation Text: This medication is absolutely contraindicated in women, especially during pregnancy and breast feeding. Feminization of male fetuses is possible if taking while pregnant. Rituxan Counseling:  I discussed with the patient the risks of Rituxan infusions. Side effects can include infusion reactions, severe drug rashes including mucocutaneous reactions, reactivation of latent hepatitis and other infections and rarely progressive multifocal leukoencephalopathy.  All of the patient's questions and concerns were addressed. Spironolactone Counseling: Patient advised regarding risks of diarrhea, abdominal pain, hyperkalemia, birth defects (for female patients), liver toxicity and renal toxicity. The patient may need blood work to monitor liver and kidney function and potassium levels while on therapy. The patient verbalized understanding of the proper use and possible adverse effects of spironolactone.  All of the patient's questions and concerns were addressed. Erivedge Counseling- I discussed with the patient the risks of Erivedge including but not limited to nausea, vomiting, diarrhea, constipation, weight loss, changes in the sense of taste, decreased appetite, muscle spasms, and hair loss.  The patient verbalized understanding of the proper use and possible adverse effects of Erivedge.  All of the patient's questions and concerns were addressed. Gabapentin Counseling: I discussed with the patient the risks of gabapentin including but not limited to dizziness, somnolence, fatigue and ataxia. Oral Minoxidil Counseling- I discussed with the patient the risks of oral minoxidil including but not limited to shortness of breath, swelling of the feet or ankles, dizziness, lightheadedness, unwanted hair growth and allergic reaction.  The patient verbalized understanding of the proper use and possible adverse effects of oral minoxidil.  All of the patient's questions and concerns were addressed. Hydroxyzine Counseling: Patient advised that the medication is sedating and not to drive a car after taking this medication.  Patient informed of potential adverse effects including but not limited to dry mouth, urinary retention, and blurry vision.  The patient verbalized understanding of the proper use and possible adverse effects of hydroxyzine.  All of the patient's questions and concerns were addressed. Hydroxyzine Pregnancy And Lactation Text: This medication is not safe during pregnancy and should not be taken. It is also excreted in breast milk and breast feeding isn't recommended. Azelaic Acid Counseling: Patient counseled that medicine may cause skin irritation and to avoid applying near the eyes.  In the event of skin irritation, the patient was advised to reduce the amount of the drug applied or use it less frequently.   The patient verbalized understanding of the proper use and possible adverse effects of azelaic acid.  All of the patient's questions and concerns were addressed. Cyclophosphamide Pregnancy And Lactation Text: This medication is Pregnancy Category D and it isn't considered safe during pregnancy. This medication is excreted in breast milk. SSKI Counseling:  I discussed with the patient the risks of SSKI including but not limited to thyroid abnormalities, metallic taste, GI upset, fever, headache, acne, arthralgias, paraesthesias, lymphadenopathy, easy bleeding, arrhythmias, and allergic reaction. Enbrel Counseling:  I discussed with the patient the risks of etanercept including but not limited to myelosuppression, immunosuppression, autoimmune hepatitis, demyelinating diseases, lymphoma, and infections.  The patient understands that monitoring is required including a PPD at baseline and must alert us or the primary physician if symptoms of infection or other concerning signs are noted. Cellcept Counseling:  I discussed with the patient the risks of mycophenolate mofetil including but not limited to infection/immunosuppression, GI upset, hypokalemia, hypercholesterolemia, bone marrow suppression, lymphoproliferative disorders, malignancy, GI ulceration/bleed/perforation, colitis, interstitial lung disease, kidney failure, progressive multifocal leukoencephalopathy, and birth defects.  The patient understands that monitoring is required including a baseline creatinine and regular CBC testing. In addition, patient must alert us immediately if symptoms of infection or other concerning signs are noted. Simponi Counseling:  I discussed with the patient the risks of golimumab including but not limited to myelosuppression, immunosuppression, autoimmune hepatitis, demyelinating diseases, lymphoma, and serious infections.  The patient understands that monitoring is required including a PPD at baseline and must alert us or the primary physician if symptoms of infection or other concerning signs are noted. Griseofulvin Counseling:  I discussed with the patient the risks of griseofulvin including but not limited to photosensitivity, cytopenia, liver damage, nausea/vomiting and severe allergy.  The patient understands that this medication is best absorbed when taken with a fatty meal (e.g., ice cream or french fries). Tranexamic Acid Pregnancy And Lactation Text: It is unknown if this medication is safe during pregnancy or breast feeding. Albendazole Counseling:  I discussed with the patient the risks of albendazole including but not limited to cytopenia, kidney damage, nausea/vomiting and severe allergy.  The patient understands that this medication is being used in an off-label manner. Metronidazole Pregnancy And Lactation Text: This medication is Pregnancy Category B and considered safe during pregnancy.  It is also excreted in breast milk. Xolair Counseling:  Patient informed of potential adverse effects including but not limited to fever, muscle aches, rash and allergic reactions.  The patient verbalized understanding of the proper use and possible adverse effects of Xolair.  All of the patient's questions and concerns were addressed. Taltz Counseling: I discussed with the patient the risks of ixekizumab including but not limited to immunosuppression, serious infections, worsening of inflammatory bowel disease and drug reactions.  The patient understands that monitoring is required including a PPD at baseline and must alert us or the primary physician if symptoms of infection or other concerning signs are noted. Rituxan Pregnancy And Lactation Text: This medication is Pregnancy Category C and it isn't know if it is safe during pregnancy. It is unknown if this medication is excreted in breast milk but similar antibodies are known to be excreted. Oxybutynin Counseling:  I discussed with the patient the risks of oxybutynin including but not limited to skin rash, drowsiness, dry mouth, difficulty urinating, and blurred vision. Hydroxychloroquine Pregnancy And Lactation Text: This medication has been shown to cause fetal harm but it isn't assigned a Pregnancy Risk Category. There are small amounts excreted in breast milk.

## 2025-06-11 NOTE — PROGRESS NOTES
[facility] - Cardiology Stepdown  Cardiology  Progress Note    Patient Name: Jordin Allen Jr.  MRN: [id number]  Admission Date: 6/9/2025  Hospital Length of Stay: 2 days  Code Status: Full Code   Attending Physician: Kenny Brush MD   Primary Care Physician: Sierra Landry MD  Expected Discharge Date: 6/12/2025  Principal Problem:Pneumonia of right lung due to infectious organism    Subjective:     Hospital Course:   No notes on file    Interval History: Cardiology consulted overnight with c/f afib/flutter/MAT on recs with dilt 60 mg q4, pt resting comfortably negative for CP or palpitations, recs for adding AC due to CHADSVASc, TTE stable    ROS  Objective:     Vital Signs (Most Recent):  Temp: 97.6 °F (36.4 °C) (06/11/25 0915)  Pulse: 82 (06/11/25 0915)  Resp: 18 (06/11/25 0915)  BP: 104/67 (06/11/25 0915)  SpO2: 97 % (06/11/25 0934) Vital Signs (24h Range):  Temp:  [97.3 °F (36.3 °C)-98.8 °F (37.1 °C)] 97.6 °F (36.4 °C)  Pulse:  [] 82  Resp:  [16-20] 18  SpO2:  [90 %-99 %] 97 %  BP: ()/(57-74) 104/67     Weight: 86.3 kg (190 lb 4.1 oz)  Body mass index is 28.1 kg/m².     SpO2: 97 %         Intake/Output Summary (Last 24 hours) at 6/11/2025 1132  Last data filed at 6/11/2025 0507  Gross per 24 hour   Intake 1695.52 ml   Output 875 ml   Net 820.52 ml       Lines/Drains/Airways       Peripheral Intravenous Line  Duration                  Peripheral IV - Single Lumen 06/09/25 2000 20 G 1/2 in Left;Posterior Wrist 1 day         Peripheral IV - Single Lumen 06/10/25 1828 20 G 1/2 in Anterior;Right Wrist <1 day                       Physical Exam  Vitals reviewed.   Constitutional:       General: He is not in acute distress.  Eyes:      General: No scleral icterus.  Cardiovascular:      Rate and Rhythm: Normal rate. Rhythm irregular.      Heart sounds: Murmur heard.   Pulmonary:      Effort: Pulmonary effort is normal. No respiratory distress.      Breath sounds: Wheezing present.   Abdominal:       General: There is no distension.      Palpations: Abdomen is soft.      Tenderness: There is no abdominal tenderness.   Musculoskeletal:      Right lower leg: No edema.      Left lower leg: No edema.   Skin:     General: Skin is warm and dry.   Neurological:      Mental Status: He is alert and oriented to person, place, and time.   Psychiatric:         Mood and Affect: Mood normal.         Thought Content: Thought content normal.         Judgment: Judgment normal.            Significant Labs: CMP   Recent Labs   Lab 06/10/25  0330 06/11/25  0442   * 135*   K 4.0 3.1*   CL 92* 94*   CO2 29 30*   * 162*   BUN 34* 34*   CREATININE 0.9 0.9   CALCIUM 9.0 8.3*   PROT 6.2 5.1*   ALBUMIN 2.2* 1.9*   BILITOT 0.6 0.5   ALKPHOS 143 116   * 121*   * 157*   ANIONGAP 13 11    and CBC   Recent Labs   Lab 06/10/25  0330 06/11/25  0441   WBC 12.20 11.82   HGB 11.6* 10.9*   HCT 36.6* 33.3*   * 143*       Significant Imaging: Echocardiogram: Transthoracic echo (TTE) complete (Cupid Only):   Results for orders placed or performed during the hospital encounter of 06/09/25   Echo   Result Value Ref Range    LV Diastolic Volume 89 mL    Echo EF Estimated 70 %    LV Systolic Volume 27 mL    IVS 0.7 0.6 - 1.1 cm    LVIDd 4.4 3.5 - 6.0 cm    LVIDs 2.7 2.1 - 4.0 cm    LVOT diameter 2.1 cm    PW 0.8 0.6 - 1.1 cm    AV LVOT peak gradient 6 mmHg    LVOT mn grad 3 mmHg    LVOT peak sonido 1.3 m/s    LVOT peak VTI 23.9 cm    RV S' 13.43 cm/s    LA size 2.2 cm    Left Atrium Major Axis 5.3 cm    Left Atrium Minor Axis 4.4 cm    LA Vol (MOD) 42 mL    AV valve area 4.2 cm2    AV area by cont VTI 4.3 cm2    AV peak gradient 5 mmHg    AV mean gradient 3 mmHg    Ao peak sonido 1.1 m/s    Ao VTI 19.8 cm    MV Peak A Sonido 0.76 m/s    E wave deceleration time 200 ms    MV Peak E Sonido 0.78 m/s    E/A ratio 1.03     LV LATERAL E/E' RATIO 7.1     LV SEPTAL E/E' RATIO 13.0     TDI LATERAL 0.11 m/s    TDI SEPTAL 0.06 m/s    Ascending  aorta 2.5 cm    STJ 2.9 cm    IVC diameter 1.59 cm    Sinus 3.43 cm    LA WIDTH 3.3 cm    TAPSE 1.6 cm    BSA 2.05 m2    LVOT stroke volume 82.7 cm3    LV Systolic Volume Index 13.4 mL/m2    LV Diastolic Volume Index 44.06 mL/m2    LVOT area 3.5 cm2    FS 38.6 28 - 44 %    Left Ventricle Relative Wall Thickness 0.36 cm    LV mass 100.6 g    LV Mass Index 49.8 g/m2    E/E' ratio 9 m/s    RAE 15 mL/m2    LA Vol 30 cm3    RAE (MOD) 21 mL/m2    AV Velocity Ratio 1.18     AV index (prosthetic) 1.21     SIN by Velocity Ratio 4.1 cm²    ASI 1.7 cm/m2    ASI 1.2 cm/m2    Mean e' 0.09 m/s    ZLVIDS -2.38     ZLVIDD -3.02     Est. RA pres 8 mmHg    Narrative      Left Ventricle: The left ventricle is normal in size. Ventricular mass   is normal. Normal wall thickness. There is low normal systolic function   with a visually estimated ejection fraction of 50 - 55%.    Inferolateral pseudodyskinesis    Right Ventricle: Right ventricle was not well visualized due to poor   acoustic window. The right ventricle is normal in size Systolic function   is mildly reduced.    IVC/SVC: Intermediate venous pressure at 8 mmHg.    RV function not well seen.       Assessment and Plan:     \  Atrial flutter with rapid ventricular response  Patient with mix of irregular and regular SVT concerning for MAT vs afib with suspected atrial Flutter with 2:1 conduction though cannot be certain based on ECG and telemetry. This has occurred in the setting of acute on chronic hypoxic respiratory failure in the setting of parainfluenza PNA.He has a recent normal TTE and an allergy to metoprolol. He converted to sinus rhythm with PACs after 2 pushes of IV diltiazem.    Recommendations:  - telemetry  - daily ECG  - K > 4, Mg > 2  - if able from respiratory perspective, would try to reduce albuterol use temporarily  - diltiazem 180 mg SR daily  - recommend AC given CHADSVASc of 4 (additionally has cancer)  - outpatient follow up with Dr. Ba         VTE  Risk Mitigation (From admission, onward)           Ordered     enoxaparin injection 90 mg  Every 12 hours         06/10/25 1726     Place sequential compression device  Until discontinued         06/10/25 1542                    Patrick James MD  Cardiology  [facility] - Cardiology Stepdown  I have personally taken the history and examined the patient and agree with the resident's note as stated above.  NOAC and change to long acting diltiazem. Echo if not yet done.

## 2025-06-11 NOTE — PLAN OF CARE
[facility]    FACILITY TRANSFER ORDERS      Patient Name: Jordin Allen Jr.  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       CAD (coronary artery disease) [I25.10]  Yes     Chronic     -LHC (10/31/13) for stemi: pLAD 100%, Cx with Li's, mRCA 40%, LVEF 55%,. LVEDP 15   -Xience 3.0 x 33 mm to pLAD, post dilated with 3.5 NC and 4.0 NC.   -TTE (8/14/13): LVEF 55%, grade I diastolic dysfunction      HTN (hypertension), benign [I10]  Yes    SHOLA (obstructive sleep apnea) [G47.33]  Yes     CPAP at night      GERD (gastroesophageal reflux disease) [K21.9]  Yes     Continue PPI        Resolved Hospital Problems   No resolved problems to display.       Allergies:  Review of patient's allergies indicates:   Allergen Reactions    Brilinta [ticagrelor] Itching    Lisinopril      Other reaction(s): cough    Metoprolol succinate Rash       Diet: cardiac diet    Activities: Activity as tolerated    Goals of Care Treatment Preferences:  Code Status: Full Code       POLST: Yes  What is most important right now is to focus on remaining as independent as possible, symptom/pain control, improvement in condition but with  limits to invasive therapies.  Accordingly, we have decided that the best plan to meet the patient's goals includes continuing with treatment.      Nursing: routine     Labs: routine     CONSULTS:    Physical Therapy to evaluate and treat.  and Occupational Therapy to evaluate and treat.        Medications: Review discharge medications with patient and family and provide education.         Medication List        START taking these medications      0.9% NaCl SolP 250 mL with vancomycin 1,000 mg SolR 1,000 mg  Inject 1,000 mg into the vein every 12 (twelve) hours.     apixaban 5 mg Tab  Commonly known as: ELIQUIS  Take 1 tablet (5 mg total) by mouth 2 (two) times daily.     benzonatate 100 MG capsule  Commonly known as: TESSALON  Take 1 capsule (100 mg total) by mouth 3 (three) times daily as needed for Cough.     D5W PgBk 100 mL with piperacillin-tazobactam 4.5 gram SolR 4.5 g  Inject 4.5 g into the vein every 8 (eight) hours. for 7 days     * dextrose 50% injection  Inject 25 mLs (12.5 g total) into the vein as needed (between 50 - 70 mg/dL if patient cannnot take PO but has IV access.).     * dextrose 50% injection  Inject 50 mLs (25 g total) into the vein as needed (less than 50 mg/dL if patient cannnot take PO but has IV access.).     diltiaZEM 180 MG 24 hr capsule  Commonly known as: CARDIZEM CD  Take 1 capsule (180 mg total) by mouth once daily.     * glucose 4 GM chewable tablet  Take 4 tablets (16 g total) by mouth as needed.     * glucose 4 GM chewable tablet  Take 6 tablets (24 g total) by mouth as needed.     guaiFENesin 600 mg 12 hr tablet  Commonly known as: MUCINEX  Take 1 tablet (600 mg total) by mouth 2 (two) times daily. for 10 days     ipratropium 0.02 % nebulizer solution  Commonly known as: ATROVENT  Take 2.5 mLs (500 mcg total) by nebulization every 6 (six) hours as needed for Wheezing. Rescue     predniSONE 50 MG Tab  Commonly known as: DELTASONE  Take 1 tablet (50 mg total) by mouth once daily.  for 2 days  Start taking on: June 12, 2025           * This list has 4 medication(s) that are the same as other medications prescribed for you. Read the directions carefully, and ask your doctor or other care provider to review them with you.                CHANGE how you take these medications      amitriptyline 25 MG tablet  Commonly known as: ELAVIL  Take 1 tablet (25 mg total) by mouth once daily.  What changed: when to take this            CONTINUE taking these medications      acetaminophen 500 MG tablet  Commonly known as: TYLENOL  Take 500 mg by mouth 2 (two) times daily as needed for Pain.     aspirin 81 MG EC tablet  Commonly known as: ECOTRIN  Take 81 mg by mouth every morning.     atorvastatin 80 MG tablet  Commonly known as: LIPITOR  Take 1 tablet (80 mg total) by mouth in the morning.     BREZTRI AEROSPHERE 160-9-4.8 mcg/actuation aa  Generic drug: budesonide-glycopyr-formoterol  Inhale 2 puffs into the lungs 2 (two) times a day.     echinacea 400 mg Cap  Take 1 capsule by mouth once daily.     fluticasone propionate 50 mcg/actuation nasal spray  Commonly known as: FLONASE  Spray 2 sprays (100 mcg total) in Each Nostril once daily.     latanoprost 0.005 % ophthalmic solution  Instill 1 drop into both eyes every night at bedtime     levETIRAcetam 500 MG Tab  Commonly known as: KEPPRA  Take 1 tablet (500 mg total) by mouth 2 (two) times daily.     losartan 100 MG tablet  Commonly known as: COZAAR  Take 1 tablet (100 mg total) by mouth once daily.     OCUVITE ORAL  Take 1 capsule by mouth once daily.     omeprazole 20 MG capsule  Commonly known as: PRILOSEC  Take 20 mg by mouth once daily.     polyethylene glycol 17 gram/dose powder  Commonly known as: GLYCOLAX  Take 17 grams by mouth every day for constipation prevention     roflumilast 500 mcg Tab  Commonly known as: DALIRESP  Take 500 mcg by mouth every morning.     traZODone 50 MG tablet  Commonly known as: DESYREL  Take 1 tablet (50 mg total) by  mouth every evening.            STOP taking these medications      albuterol 0.63 mg/3 mL Nebu  Commonly known as: ACCUNEB     albuterol 90 mcg/actuation inhaler  Commonly known as: PROVENTIL/VENTOLIN HFA     dexAMETHasone 4 MG Tab  Commonly known as: DECADRON     furosemide 20 MG tablet  Commonly known as: LASIX     ibuprofen 200 MG tablet  Commonly known as: ADVIL,MOTRIN     nitroGLYCERIN 0.4 MG SL tablet  Commonly known as: NITROSTAT     SENNA ORAL                Immunizations Administered as of 6/11/2025       Name Date Dose VIS Date Route Exp Date    COVID-19, MRNA, LN-S, PF (Pfizer) (Purple Cap) 8/26/2021 0.3 mL -- Intramuscular --    Site: Left deltoid     : [employer]     Lot: [id number]     Comment: Adminis     COVID-19, MRNA, LN-S, PF (Pfizer) (Purple Cap) 2/2/2021  7:23 AM 0.3 mL 12/12/2020 Intramuscular 5/31/2021    Site: Left deltoid     Given By: Roberto Gay RN     : [employer]     Lot: [id number]     COVID-19, MRNA, LN-S, PF (Pfizer) (Purple Cap) 1/12/2021 11:22 AM 0.3 mL 12/12/2020 Intramuscular 4/30/2021    Site: Left deltoid     Given By: Roberto Gay RN     : [employer]     Lot: [id number]                 Some patients may experience side effects after vaccination.  These may include fever, headache, muscle or joint aches.  Most symptoms resolve with 24-48 hours and do not require urgent medical evaluation unless they persist for more than 72 hours or symptoms are concerning for an unrelated medical condition.          _________________________________  Kenny Brush MD  06/11/2025

## 2025-06-11 NOTE — SUBJECTIVE & OBJECTIVE
Past Medical History:   Diagnosis Date    Benign neoplasm of colon 10/01/2013    Bronchitis chronic     CAD (coronary artery disease) 10/31/2013    Cataract 2008    COPD (chronic obstructive pulmonary disease)     Emphysema of lung     Encounter for preventive health examination 03/21/2018    GERD (gastroesophageal reflux disease)     Glaucoma 2010    Hearing loss 2015    Heart attack 2013    Hx of colonic polyps     Hypertension     NAFLD (nonalcoholic fatty liver disease) 03/27/2017    SHOLA on CPAP     RLS (restless legs syndrome)     Screen for colon cancer 08/30/2013       Past Surgical History:   Procedure Laterality Date    bilateral foot surgery  1993    CARDIAC CATHETERIZATION  2013    x1    CATARACT EXTRACTION, BILATERAL      COLON SURGERY  2013    Ace Mott MD    COLONOSCOPY N/A 03/21/2018    Procedure: COLONOSCOPY;  Surgeon: Terry Mott MD;  Location: [facility] ([facility]);  Service: Endoscopy;  Laterality: N/A;    COLONOSCOPY N/A 04/12/2019    Procedure: COLONOSCOPY;  Surgeon: John Mantilla MD;  Location: [facility] ([facility]);  Service: Endoscopy;  Laterality: N/A;    COLONOSCOPY N/A 05/31/2022    Procedure: COLONOSCOPY;  Surgeon: MEDINA Farris MD;  Location: [facility] ([facility]);  Service: Endoscopy;  Laterality: N/A;  fully vacc-inst portal-tb    ENDOBRONCHIAL ULTRASOUND Bilateral 04/30/2024    Procedure: ENDOBRONCHIAL ULTRASOUND (EBUS);  Surgeon: Naveed Aguero MD;  Location: [facility] OR;  Service: Pulmonary;  Laterality: Bilateral;    EYE SURGERY  2011    scrotal abscess removal      TYMPANOSTOMY TUBE PLACEMENT  2017       Review of patient's allergies indicates:   Allergen Reactions    Brilinta [ticagrelor] Itching    Lisinopril      Other reaction(s): cough    Metoprolol succinate Rash       Current Facility-Administered Medications on File Prior to Encounter   Medication    [ - Suspended Admission] acetaminophen tablet 650 mg    [ - Suspended Admission] albuterol nebulizer solution 0.6667  mg    [ - Suspended Admission] amitriptyline tablet 25 mg    [ - Suspended Admission] ammonium lactate 12 % lotion    [ - Suspended Admission] aspirin EC tablet 81 mg    [ - Suspended Admission] atorvastatin tablet 80 mg    [ - Suspended Admission] calcium carbonate 200 mg calcium (500 mg) chewable tablet 500 mg    dextrose 50% injection 12.5 g    dextrose 50% injection 25 g    [ - Suspended Admission] diltiaZEM tablet 120 mg    [ - Suspended Admission] enoxaparin injection 40 mg    [ - Suspended Admission] fluticasone propionate 50 mcg/actuation nasal spray 100 mcg    [ - Suspended Admission] furosemide tablet 40 mg    [ - Suspended Admission] guaiFENesin 12 hr tablet 1,200 mg    [ - Suspended Admission] hydrOXYzine HCL tablet 25 mg    insulin regular injection 0-8 Units    [ - Suspended Admission] levalbuterol nebulizer solution 1.2495 mg    [ - Suspended Admission] levETIRAcetam tablet 500 mg    [ - Suspended Admission] melatonin tablet 6 mg    [ - Suspended Admission] methocarbamoL tablet 500 mg    [ - Suspended Admission] mirtazapine tablet 15 mg    [] mupirocin 2 % ointment    [ - Suspended Admission] naloxone 0.4 mg/mL injection 0.02 mg    [ - Suspended Admission] nitroGLYCERIN SL tablet 0.4 mg    [ - Suspended Admission] ondansetron disintegrating tablet 4 mg    [ - Suspended Admission] oxyCODONE-acetaminophen 5-325 mg per tablet 1 tablet    [ - Suspended Admission] pantoprazole EC tablet 40 mg    [ - Suspended Admission] polyethylene glycol packet 17 g    [ - Suspended Admission] roflumilast (DALIRESP) tablet 500 mcg    [ - Suspended Admission] sodium chloride 0.9% flush 10 mL    [ - Suspended Admission] sodium chloride 3% nebulizer solution 4 mL    [ - Suspended Admission] tiotropium bromide 2.5 mcg/actuation inhaler 2 puff     Current Outpatient Medications on File Prior to Encounter   Medication Sig    acetaminophen (TYLENOL) 500 MG tablet Take 500 mg by mouth 2 (two) times daily as needed for  Pain.    albuterol (ACCUNEB) 0.63 mg/3 mL Nebu Inhale 3 mLs (0.63 mg total) by nebulization every 6 (six) hours as needed (if symptoms failed to improve with inhaler). Rescue    albuterol (PROVENTIL/VENTOLIN HFA) 90 mcg/actuation inhaler Inhale 2 puffs into the lungs every 4 (four) hours as needed for Wheezing.    amitriptyline (ELAVIL) 25 MG tablet Take 1 tablet (25 mg total) by mouth once daily. (Patient taking differently: Take 25 mg by mouth every evening.)    aspirin (ECOTRIN) 81 MG EC tablet Take 81 mg by mouth every morning.    atorvastatin (LIPITOR) 80 MG tablet Take 1 tablet (80 mg total) by mouth in the morning.    BETA-CAROTENE,A, W-C & E/MIN (OCUVITE ORAL) Take 1 capsule by mouth once daily.     budesonide-glycopyr-formoterol (BREZTRI AEROSPHERE) 160-9-4.8 mcg/actuation HFAA Inhale 2 puffs into the lungs 2 (two) times a day.    dexAMETHasone (DECADRON) 4 MG Tab Take 1 tablet (4 mg total) by mouth 2 (two) times daily. (Patient not taking: Reported on 5/16/2025)    echinacea 400 mg Cap Take 1 capsule by mouth once daily.     fluticasone propionate (FLONASE) 50 mcg/actuation nasal spray Spray 2 sprays (100 mcg total) in Each Nostril once daily.    furosemide (LASIX) 20 MG tablet Take 1 tablet (20 mg total) by mouth once daily.    ibuprofen (ADVIL,MOTRIN) 200 MG tablet Take 200 mg by mouth every 8 (eight) hours as needed for Pain.    latanoprost 0.005 % ophthalmic solution Instill 1 drop into both eyes every night at bedtime    levETIRAcetam (KEPPRA) 500 MG Tab Take 1 tablet (500 mg total) by mouth 2 (two) times daily.    losartan (COZAAR) 100 MG tablet Take 1 tablet (100 mg total) by mouth once daily.    nitroGLYCERIN (NITROSTAT) 0.4 MG SL tablet Place 1 tablet (0.4 mg total) under the tongue every 5 (five) minutes as needed.    omeprazole (PRILOSEC) 20 MG capsule Take 20 mg by mouth once daily.    polyethylene glycol (GLYCOLAX) 17 gram/dose powder Take 17 grams by mouth every day for constipation  prevention    roflumilast (DALIRESP) 500 mcg Tab Take 500 mcg by mouth every morning.    sennosides (SENNA ORAL) Take 1 tablet by mouth daily as needed (Constipation).    traZODone (DESYREL) 50 MG tablet Take 1 tablet (50 mg total) by mouth every evening.     Family History       Problem Relation (Age of Onset)    Diabetes Maternal Grandmother    Heart attack Mother    Heart disease Mother    Hypertension Mother    No Known Problems Sister, Daughter          Tobacco Use    Smoking status: Former     Current packs/day: 0.00     Average packs/day: 1 pack/day for 40.0 years (40.0 ttl pk-yrs)     Types: Cigarettes     Start date: 8/15/1972     Quit date: 8/15/2012     Years since quittin.8     Passive exposure: Never    Smokeless tobacco: Never   Substance and Sexual Activity    Alcohol use: Yes     Alcohol/week: 3.0 standard drinks of alcohol     Types: 3 Cans of beer per week     Comment: maybe 2-3  beers weekly    Drug use: No    Sexual activity: Yes     Partners: Female     Review of Systems   Cardiovascular:  Negative for chest pain, dyspnea on exertion, irregular heartbeat, leg swelling, near-syncope and palpitations.   Respiratory:  Negative for cough, shortness of breath, sputum production and wheezing.    All other systems reviewed and are negative.    Objective:     Vital Signs (Most Recent):  Temp: 98.8 °F (37.1 °C) (06/10/25 1950)  Pulse: 84 (06/10/25 2016)  Resp: 18 (06/10/25 1950)  BP: 104/68 (06/10/25 2016)  SpO2: 99 % (06/10/25 1950) Vital Signs (24h Range):  Temp:  [97.3 °F (36.3 °C)-98.8 °F (37.1 °C)] 98.8 °F (37.1 °C)  Pulse:  [] 84  Resp:  [16-22] 18  SpO2:  [89 %-99 %] 99 %  BP: ()/(56-79) 104/68     Weight: 86.3 kg (190 lb 4.1 oz)  Body mass index is 28.1 kg/m².    SpO2: 99 %         Intake/Output Summary (Last 24 hours) at 6/10/2025 2030  Last data filed at 6/10/2025 1804  Gross per 24 hour   Intake 1443.81 ml   Output 650 ml   Net 793.81 ml       Lines/Drains/Airways        "Peripheral Intravenous Line  Duration                  Peripheral IV - Single Lumen 06/09/25 2000 20 G 1/2 in Left;Posterior Wrist 1 day         Peripheral IV - Single Lumen 06/10/25 1828 20 G 1/2 in Anterior;Right Wrist <1 day                     Physical Exam  Vitals reviewed.   Constitutional:       General: He is not in acute distress.  Eyes:      General: No scleral icterus.  Cardiovascular:      Rate and Rhythm: Normal rate. Rhythm irregular.      Heart sounds: Murmur heard.   Pulmonary:      Effort: Pulmonary effort is normal. No respiratory distress.      Breath sounds: Wheezing present.   Abdominal:      General: There is no distension.      Palpations: Abdomen is soft.      Tenderness: There is no abdominal tenderness.   Musculoskeletal:      Right lower leg: No edema.      Left lower leg: No edema.   Skin:     General: Skin is warm and dry.   Neurological:      Mental Status: He is alert and oriented to person, place, and time.   Psychiatric:         Mood and Affect: Mood normal.         Thought Content: Thought content normal.         Judgment: Judgment normal.          Significant Labs: ABG:   Recent Labs   Lab 06/09/25  0720   PH 7.499*   PCO2 56.3*   HCO3 43.8*   POCSATURATED 96   BE 21*   , CMP   Recent Labs   Lab 06/09/25  0553 06/09/25  0905 06/10/25  0330   * 135* 134*   K 3.6 3.5 4.0   CL 87* 88* 92*   CO2 32* 34* 29   GLU 98 109 150*   BUN 25* 26* 34*   CREATININE 0.9 0.9 0.9   CALCIUM 9.0 9.0 9.0   PROT 6.2 6.2 6.2   ALBUMIN 2.2* 2.3* 2.2*   BILITOT 0.8 0.7 0.6   ALKPHOS 129 124 143   AST 37 35 136*   ALT 63* 62* 125*   ANIONGAP 15 13 13   , CBC   Recent Labs   Lab 06/09/25  0553 06/09/25  0905 06/10/25  0330   WBC 14.42* 15.55* 12.20   HGB 11.9* 11.9* 11.6*   HCT 36.9* 36.8* 36.6*   * 129* 130*   , INR No results for input(s): "INR", "PROTIME" in the last 48 hours., Lipid Panel No results for input(s): "CHOL", "HDL", "LDLCALC", "TRIG", "CHOLHDL" in the last 48 hours., Troponin "   Recent Labs   Lab 06/09/25  0905 06/09/25  1415   TROPONINIHS 12 11   , and All pertinent lab results from the last 24 hours have been reviewed.    Significant Imaging: Echocardiogram: Transthoracic echo (TTE) complete (Cupid Only):   Results for orders placed or performed during the hospital encounter of 05/22/25   Echo   Result Value Ref Range    LV Diastolic Volume 77 mL    Echo EF Estimated 54 %    LV Systolic Volume 35 mL    IVS 0.9 0.6 - 1.1 cm    LVIDd 4.2 3.5 - 6.0 cm    LVIDs 3.0 2.1 - 4.0 cm    LVOT diameter 2.0 cm    PW 1.0 0.6 - 1.1 cm    AV LVOT peak gradient 5 mmHg    LVOT mn grad 3 mmHg    LVOT peak sonido 1.1 m/s    LVOT peak VTI 16.9 cm    RV- [place]antiago basal diam 3.2 cm    LA size 3.9 cm    Left Atrium Major Axis 4.7 cm    Left Atrium Minor Axis 5.0 cm    LA Vol (MOD) 39 mL    RA Major [place] 4.76 cm    AV valve area 2.5 cm2    AV area by cont VTI 2.6 cm2    AV peak gradient 9 mmHg    AV mean gradient 5 mmHg    Ao peak sonido 1.5 m/s    Ao VTI 21.2 cm    MV Peak A Sonido 0.97 m/s    E wave deceleration time 121 ms    MV Peak E Sonido 0.80 m/s    E/A ratio 0.82     LV LATERAL E/E' RATIO 11.4     LV SEPTAL E/E' RATIO 11.4     TDI LATERAL 0.07 m/s    TDI SEPTAL 0.07 m/s    Ascending aorta 2.7 cm    STJ 3.2 cm    Sinus 2.79 cm    LA WIDTH 3.2 cm    RA Width 3.02 cm    BSA 2.16 m2    LVOT stroke volume 53.1 cm3    LV Systolic Volume Index 16.5 mL/m2    LV Diastolic Volume Index 36.32 mL/m2    LVOT area 3.1 cm2    FS 28.6 28 - 44 %    Left Ventricle Relative Wall Thickness 0.48 cm    LV mass 127.8 g    LV Mass Index 60.3 g/m2    E/E' ratio 11 m/s    RAE 24 mL/m2    LA Vol 51 cm3    RV/LV Ratio 0.76 cm    RAE (MOD) 18 mL/m2    AV Velocity Ratio 0.73     AV index (prosthetic) 0.80     SIN by Velocity Ratio 2.3 cm²    ASI 1.3 cm/m2    ASI 1.3 cm/m2    Mean e' 0.07 m/s    ZLVIDS -2.45     ZLVIDD -4.67     Est. RA pres 3 mmHg    Narrative      Tachycardia was present during the study    Erall the study quality was  technically difficult. valves not well   visualized.    Left Ventricle: The left ventricle is normal in size. Normal wall   thickness. Normal wall motion. There is normal systolic function with a   visually estimated ejection fraction of 65 - 70%. There is normal   diastolic function. Normal left ventricular filling pressure.    Right Ventricle: The right ventricle is normal in size Wall thickness   is normal. Systolic function is normal.    Left Atrium: Left atrium was not well visualized.    Right Atrium: Not well visualized due to poor acoustic window.    Aortic Valve: Not well visualized due to poor acoustic window.    Mitral Valve: Not well visualized due to poor acoustic window.    Tricuspid Valve: Not well visualized due to poor acoustic window.    Pulmonic Valve: Not well visualized due to poor acoustic window.    Aorta: The aortic root is normal in size measuring 2.79 cm. The   ascending aorta is normal in size measuring 2.7 cm.    IVC/SVC: Normal venous pressure at 3 mmHg.    Pericardium: There is no pericardial effusion.

## 2025-06-11 NOTE — PLAN OF CARE
Plan of care discussed with patient. Patient AOX4. See note and flowsheets. Pt maintained free from falls/trauma/injury.  Pt denies pain and discomfort. All questions and concerns addressed. IV abx given.         Problem: Skin Injury Risk Increased  Goal: Skin Health and Integrity  Outcome: Progressing     Problem: Adult Inpatient Plan of Care  Goal: Plan of Care Review  Outcome: Progressing  Goal: Patient-Specific Goal (Individualized)  Outcome: Progressing  Goal: Absence of Hospital-Acquired Illness or Injury  Outcome: Progressing  Goal: Optimal Comfort and Wellbeing  Outcome: Progressing  Goal: Readiness for Transition of Care  Outcome: Progressing     Problem: COPD (Chronic Obstructive Pulmonary Disease)  Goal: Optimal Chronic Illness Coping  Outcome: Progressing  Goal: Optimal Level of Functional [place]  Outcome: Progressing  Goal: Absence of Infection Signs and Symptoms  Outcome: Progressing  Goal: Improved Oral Intake  Outcome: Progressing  Goal: Effective Oxygenation and Ventilation  Outcome: Progressing     Problem: Pneumonia  Goal: Fluid Balance  Outcome: Progressing  Goal: Resolution of Infection Signs and Symptoms  Outcome: Progressing  Goal: Effective Oxygenation and Ventilation  Outcome: Progressing     Problem: Wound  Goal: Optimal Coping  Outcome: Progressing  Goal: Optimal Functional Ability  Outcome: Progressing  Goal: Absence of Infection Signs and Symptoms  Outcome: Progressing  Goal: Improved Oral Intake  Outcome: Progressing  Goal: Optimal Pain Control and Function  Outcome: Progressing  Goal: Skin Health and Integrity  Outcome: Progressing  Goal: Optimal Wound Healing  Outcome: Progressing     Problem: Infection  Goal: Absence of Infection Signs and Symptoms  Outcome: Progressing

## 2025-06-11 NOTE — SUBJECTIVE & OBJECTIVE
Interval History: Cardiology consulted overnight with c/f afib/flutter/MAT on recs with dilt 60 mg q4, pt resting comfortably negative for CP or palpitations, recs for adding AC due to CHADSVASc, TTE stable    ROS  Objective:     Vital Signs (Most Recent):  Temp: 97.6 °F (36.4 °C) (06/11/25 0915)  Pulse: 82 (06/11/25 0915)  Resp: 18 (06/11/25 0915)  BP: 104/67 (06/11/25 0915)  SpO2: 97 % (06/11/25 0934) Vital Signs (24h Range):  Temp:  [97.3 °F (36.3 °C)-98.8 °F (37.1 °C)] 97.6 °F (36.4 °C)  Pulse:  [] 82  Resp:  [16-20] 18  SpO2:  [90 %-99 %] 97 %  BP: ()/(57-74) 104/67     Weight: 86.3 kg (190 lb 4.1 oz)  Body mass index is 28.1 kg/m².     SpO2: 97 %         Intake/Output Summary (Last 24 hours) at 6/11/2025 1132  Last data filed at 6/11/2025 0507  Gross per 24 hour   Intake 1695.52 ml   Output 875 ml   Net 820.52 ml       Lines/Drains/Airways       Peripheral Intravenous Line  Duration                  Peripheral IV - Single Lumen 06/09/25 2000 20 G 1/2 in Left;Posterior Wrist 1 day         Peripheral IV - Single Lumen 06/10/25 1828 20 G 1/2 in Anterior;Right Wrist <1 day                       Physical Exam  Vitals reviewed.   Constitutional:       General: He is not in acute distress.  Eyes:      General: No scleral icterus.  Cardiovascular:      Rate and Rhythm: Normal rate. Rhythm irregular.      Heart sounds: Murmur heard.   Pulmonary:      Effort: Pulmonary effort is normal. No respiratory distress.      Breath sounds: Wheezing present.   Abdominal:      General: There is no distension.      Palpations: Abdomen is soft.      Tenderness: There is no abdominal tenderness.   Musculoskeletal:      Right lower leg: No edema.      Left lower leg: No edema.   Skin:     General: Skin is warm and dry.   Neurological:      Mental Status: He is alert and oriented to person, place, and time.   Psychiatric:         Mood and Affect: Mood normal.         Thought Content: Thought content normal.          Judgment: Judgment normal.            Significant Labs: CMP   Recent Labs   Lab 06/10/25  0330 06/11/25  0442   * 135*   K 4.0 3.1*   CL 92* 94*   CO2 29 30*   * 162*   BUN 34* 34*   CREATININE 0.9 0.9   CALCIUM 9.0 8.3*   PROT 6.2 5.1*   ALBUMIN 2.2* 1.9*   BILITOT 0.6 0.5   ALKPHOS 143 116   * 121*   * 157*   ANIONGAP 13 11    and CBC   Recent Labs   Lab 06/10/25  0330 06/11/25  0441   WBC 12.20 11.82   HGB 11.6* 10.9*   HCT 36.6* 33.3*   * 143*       Significant Imaging: Echocardiogram: Transthoracic echo (TTE) complete (Cupid Only):   Results for orders placed or performed during the hospital encounter of 06/09/25   Echo   Result Value Ref Range    LV Diastolic Volume 89 mL    Echo EF Estimated 70 %    LV Systolic Volume 27 mL    IVS 0.7 0.6 - 1.1 cm    LVIDd 4.4 3.5 - 6.0 cm    LVIDs 2.7 2.1 - 4.0 cm    LVOT diameter 2.1 cm    PW 0.8 0.6 - 1.1 cm    AV LVOT peak gradient 6 mmHg    LVOT mn grad 3 mmHg    LVOT peak sonido 1.3 m/s    LVOT peak VTI 23.9 cm    RV S' 13.43 cm/s    LA size 2.2 cm    Left Atrium Major Axis 5.3 cm    Left Atrium Minor Axis 4.4 cm    LA Vol (MOD) 42 mL    AV valve area 4.2 cm2    AV area by cont VTI 4.3 cm2    AV peak gradient 5 mmHg    AV mean gradient 3 mmHg    Ao peak sonido 1.1 m/s    Ao VTI 19.8 cm    MV Peak A Sonido 0.76 m/s    E wave deceleration time 200 ms    MV Peak E Sonido 0.78 m/s    E/A ratio 1.03     LV LATERAL E/E' RATIO 7.1     LV SEPTAL E/E' RATIO 13.0     TDI LATERAL 0.11 m/s    TDI SEPTAL 0.06 m/s    Ascending aorta 2.5 cm    STJ 2.9 cm    IVC diameter 1.59 cm    Sinus 3.43 cm    LA WIDTH 3.3 cm    TAPSE 1.6 cm    BSA 2.05 m2    LVOT stroke volume 82.7 cm3    LV Systolic Volume Index 13.4 mL/m2    LV Diastolic Volume Index 44.06 mL/m2    LVOT area 3.5 cm2    FS 38.6 28 - 44 %    Left Ventricle Relative Wall Thickness 0.36 cm    LV mass 100.6 g    LV Mass Index 49.8 g/m2    E/E' ratio 9 m/s    RAE 15 mL/m2    LA Vol 30 cm3    RAE (MOD) 21 mL/m2     AV Velocity Ratio 1.18     AV index (prosthetic) 1.21     SIN by Velocity Ratio 4.1 cm²    ASI 1.7 cm/m2    ASI 1.2 cm/m2    Mean e' 0.09 m/s    ZLVIDS -2.38     ZLVIDD -3.02     Est. RA pres 8 mmHg    Narrative      Left Ventricle: The left ventricle is normal in size. Ventricular mass   is normal. Normal wall thickness. There is low normal systolic function   with a visually estimated ejection fraction of 50 - 55%.    Inferolateral pseudodyskinesis    Right Ventricle: Right ventricle was not well visualized due to poor   acoustic window. The right ventricle is normal in size Systolic function   is mildly reduced.    IVC/SVC: Intermediate venous pressure at 8 mmHg.    RV function not well seen.

## 2025-06-11 NOTE — CARE UPDATE
Mr. Allen arrived at 14:00 and was placed in bed on NC 2L with humidification. No distress noted, slight abdominal breathing present. Mr Allen states he feels good. The pt.'s home CPAP is at bedside, sterile water placed. I will continue to monitor the pt.

## 2025-06-11 NOTE — PLAN OF CARE
Problem: Pneumonia  Goal: Effective Oxygenation and Ventilation  Outcome: Progressing     Problem: Breathing Pattern Ineffective  Goal: Effective Breathing Pattern  Outcome: Progressing     Problem: Airway Clearance Ineffective  Goal: Effective Airway Clearance  Outcome: Progressing     Problem: Gas Exchange Impaired  Goal: Optimal Gas Exchange  Outcome: Progressing     Problem: Noninvasive Ventilation Acute  Goal: Effective Unassisted Ventilation and Oxygenation  Outcome: Progressing

## 2025-06-12 LAB
OHS QRS DURATION: 78 MS
OHS QTC CALCULATION: 471 MS

## 2025-06-12 PROCEDURE — 99900035 HC TECH TIME PER 15 MIN (STAT)

## 2025-06-12 PROCEDURE — 11000001 HC ACUTE MED/SURG PRIVATE ROOM

## 2025-06-12 PROCEDURE — 94640 AIRWAY INHALATION TREATMENT: CPT

## 2025-06-12 PROCEDURE — 27000221 HC OXYGEN, UP TO 24 HOURS

## 2025-06-12 PROCEDURE — 94761 N-INVAS EAR/PLS OXIMETRY MLT: CPT

## 2025-06-12 PROCEDURE — 25000003 PHARM REV CODE 250

## 2025-06-12 PROCEDURE — 97162 PT EVAL MOD COMPLEX 30 MIN: CPT

## 2025-06-12 PROCEDURE — 97530 THERAPEUTIC ACTIVITIES: CPT

## 2025-06-12 PROCEDURE — 97168 OT RE-EVAL EST PLAN CARE: CPT

## 2025-06-12 PROCEDURE — 25000003 PHARM REV CODE 250: Performed by: STUDENT IN AN ORGANIZED HEALTH CARE EDUCATION/TRAINING PROGRAM

## 2025-06-12 PROCEDURE — 63600175 PHARM REV CODE 636 W HCPCS: Performed by: STUDENT IN AN ORGANIZED HEALTH CARE EDUCATION/TRAINING PROGRAM

## 2025-06-12 PROCEDURE — 25000003 PHARM REV CODE 250: Performed by: HOSPITALIST

## 2025-06-12 PROCEDURE — 99900031 HC PATIENT EDUCATION (STAT)

## 2025-06-12 PROCEDURE — 94668 MNPJ CHEST WALL SBSQ: CPT

## 2025-06-12 PROCEDURE — 25000242 PHARM REV CODE 250 ALT 637 W/ HCPCS

## 2025-06-12 PROCEDURE — 94664 DEMO&/EVAL PT USE INHALER: CPT

## 2025-06-12 RX ORDER — DILTIAZEM HYDROCHLORIDE 180 MG/1
180 CAPSULE, COATED, EXTENDED RELEASE ORAL DAILY
Status: DISCONTINUED | OUTPATIENT
Start: 2025-06-13 | End: 2025-06-16

## 2025-06-12 RX ORDER — DILTIAZEM HYDROCHLORIDE 60 MG/1
60 TABLET, FILM COATED ORAL EVERY 8 HOURS
Status: COMPLETED | OUTPATIENT
Start: 2025-06-12 | End: 2025-06-12

## 2025-06-12 RX ADMIN — PIPERACILLIN AND TAZOBACTAM 4.5 G: 4; .5 INJECTION, POWDER, FOR SOLUTION INTRAVENOUS at 09:06

## 2025-06-12 RX ADMIN — DILTIAZEM HYDROCHLORIDE 60 MG: 60 TABLET, FILM COATED ORAL at 09:06

## 2025-06-12 RX ADMIN — ASPIRIN 81 MG: 81 TABLET, COATED ORAL at 09:06

## 2025-06-12 RX ADMIN — LEVALBUTEROL HYDROCHLORIDE 1.25 MG: 0.63 SOLUTION RESPIRATORY (INHALATION) at 08:06

## 2025-06-12 RX ADMIN — DILTIAZEM HYDROCHLORIDE 60 MG: 60 TABLET, FILM COATED ORAL at 05:06

## 2025-06-12 RX ADMIN — METHOCARBAMOL 500 MG: 500 TABLET ORAL at 10:06

## 2025-06-12 RX ADMIN — LEVALBUTEROL HYDROCHLORIDE 1.25 MG: 0.63 SOLUTION RESPIRATORY (INHALATION) at 12:06

## 2025-06-12 RX ADMIN — Medication 4 ML: at 07:06

## 2025-06-12 RX ADMIN — LEVALBUTEROL HYDROCHLORIDE 1.25 MG: 0.63 SOLUTION RESPIRATORY (INHALATION) at 03:06

## 2025-06-12 RX ADMIN — ROFLUMILAST 500 MCG: 500 TABLET ORAL at 09:06

## 2025-06-12 RX ADMIN — PANTOPRAZOLE SODIUM 40 MG: 40 TABLET, DELAYED RELEASE ORAL at 09:06

## 2025-06-12 RX ADMIN — AMITRIPTYLINE HYDROCHLORIDE 25 MG: 25 TABLET, FILM COATED ORAL at 09:06

## 2025-06-12 RX ADMIN — LEVETIRACETAM 500 MG: 500 TABLET, FILM COATED ORAL at 09:06

## 2025-06-12 RX ADMIN — APIXABAN 5 MG: 5 TABLET, FILM COATED ORAL at 09:06

## 2025-06-12 RX ADMIN — GUAIFENESIN 1200 MG: 600 TABLET, MULTILAYER, EXTENDED RELEASE ORAL at 09:06

## 2025-06-12 RX ADMIN — MELATONIN TAB 3 MG 6 MG: 3 TAB at 09:06

## 2025-06-12 RX ADMIN — LEVALBUTEROL HYDROCHLORIDE 1.25 MG: 0.63 SOLUTION RESPIRATORY (INHALATION) at 07:06

## 2025-06-12 RX ADMIN — FLUTICASONE PROPIONATE 100 MCG: 50 SPRAY, METERED NASAL at 09:06

## 2025-06-12 RX ADMIN — OXYCODONE HYDROCHLORIDE AND ACETAMINOPHEN 1 TABLET: 5; 325 TABLET ORAL at 04:06

## 2025-06-12 RX ADMIN — TIOTROPIUM BROMIDE INHALATION SPRAY 2 PUFF: 3.12 SPRAY, METERED RESPIRATORY (INHALATION) at 08:06

## 2025-06-12 RX ADMIN — PREDNISONE 50 MG: 20 TABLET ORAL at 09:06

## 2025-06-12 RX ADMIN — Medication 4 ML: at 08:06

## 2025-06-12 RX ADMIN — ATORVASTATIN CALCIUM 80 MG: 80 TABLET, FILM COATED ORAL at 09:06

## 2025-06-12 RX ADMIN — PIPERACILLIN AND TAZOBACTAM 4.5 G: 4; .5 INJECTION, POWDER, FOR SOLUTION INTRAVENOUS at 11:06

## 2025-06-12 RX ADMIN — MIRTAZAPINE 15 MG: 15 TABLET, FILM COATED ORAL at 09:06

## 2025-06-12 RX ADMIN — Medication: at 09:06

## 2025-06-12 RX ADMIN — DILTIAZEM HYDROCHLORIDE 60 MG: 60 TABLET, FILM COATED ORAL at 01:06

## 2025-06-12 NOTE — CARE UPDATE
"   06/11/25 2036   Patient Assessment/Suction   Level of Consciousness (AVPU) alert   Respiratory Effort Normal;Unlabored   Expansion/Accessory Muscles/Retractions expansion symmetric;no retractions;no use of accessory muscles   All Lung Fields Breath Sounds diminished   Rhythm/Pattern, Respiratory depth regular;pattern regular;unlabored   Cough Frequency no cough   PRE-TX-O2   Device (Oxygen Therapy) nasal cannula with humidification   $ Is the patient on Low Flow Oxygen? Yes   Flow (L/min) (Oxygen Therapy) 2   Oxygen Concentration (%) 28   SpO2 97 %   Pulse Oximetry Type Continuous   $ Pulse Oximetry - Multiple Charge Pulse Oximetry - Multiple   Pulse 86   Resp 18   Aerosol Therapy   $ Aerosol Therapy Charges Aerosol Treatment   Daily Review of Necessity (SVN) completed   Respiratory Treatment Status (SVN) given   Treatment Route (SVN) mask   Patient Position HOB elevated   Post Treatment Assessment (SVN) breath sounds unchanged   Signs of Intolerance (SVN) none   Breath Sounds Post-Respiratory Treatment   Throughout All Fields Post-Treatment All Fields   Throughout All Fields Post-Treatment no change   Post-treatment Heart Rate (beats/min) 84   Post-treatment Resp Rate (breaths/min) 18   General Safety Checklist   Safety Promotion/Fall Prevention side rails raised   Equipment Change   $ RT Equipment other (see comments)  (ACAPELLA)   Respiratory Interventions   Cough And Deep Breathing done independently per patient   Airway/Ventilation Management airway patency maintained;calming measures promoted;humidification applied;pulmonary hygiene promoted   Airway/Ventilation Support comfort measures provided;cough relief provided;dyspnea relief promoted;humidification applied;pulmonary hygiene promoted   Breathing Techniques/Airway Clearance deep/controlled cough encouraged;diaphragmatic breathing promoted   IBW/VT Calculations   Height 5' 9" (1.753 m)   IBW/kg (Calculated) Male 70.7 kg   Low Range Vt 4cc/kg MALE 282.8 " mL   Low Range Vt 6cc/kg MALE 424.2 mL   Adult Moderate Range Vt 8cc/kg .6 mL   Adult High Range Vt 10cc/kg MALE 707 mL   Education   $ Education Bronchodilator;Oxygen;PEP Therapy;Other (see comment)  (CPAP)   Respiratory Evaluation   $ Care Plan Tech Time 15 min   Evaluation For New Orders   Admitting Diagnosis ACUTE ON CHRONIC RESPIRATORY FAILURE WITH HYPOXIA   Cardiac Diagnosis A-FLUTTER   Pulmonary Diagnosis ACUTE ON CHRONIC RESPIRATORY FAILURE WITH HYPOXIA, SHOLA, COPD   Oxygen Care Plan   Oxygen Care Plan Per Protocol   SPO2 Goal (%) MD order   Rationale SpO2 is <MD Goal   Bronchodilator Care Plan   Bronchodilator Care Plan Aerosol   Aerosol Meds w/ frequency Sodium Chloride 3% BID;Other  (LEVALBUTEROL 1.2495 Q4)   DPI Meds w/ frequency Other  (SPIRIVA 2.5 MCG)   Airway Clearance Care Plan   Airway Clearance Care Plan CPT   Frequency TID   Rationale Rhonchi

## 2025-06-12 NOTE — PLAN OF CARE
Pt pleasant and cooperative. Repositioned q2h for comfort. Sit in chair in am. Safety measures maintained. Pt with good appetite. Droplet precautions maintained. Requested pain medication X1.       Problem: Adult Inpatient Plan of Care  Goal: Absence of Hospital-Acquired Illness or Injury  Outcome: Progressing     Problem: Adult Inpatient Plan of Care  Goal: Optimal Comfort and Wellbeing  Outcome: Progressing     Problem: Wound  Goal: Improved Oral Intake  Outcome: Progressing

## 2025-06-12 NOTE — NURSING
PT alert and oriented. PT slept well overnight, PRN melatonin given, NAD noted. Denies pain. 2 episode of bradycardia overnight, asymptomatic, charge RN and MD aware, meds adjusted. CPAP worn with good sats, otherwise on 2L NC. Pt voided per urinal while sitting on bedside, good output. Safety maintained. Bed locked in lowest position, call light within reach. All needs met.

## 2025-06-12 NOTE — PLAN OF CARE
Problem: Adult Inpatient Plan of Care  Goal: Plan of Care Review  Outcome: Progressing  Goal: Patient-Specific Goal (Individualized)  Outcome: Progressing  Goal: Absence of Hospital-Acquired Illness or Injury  Outcome: Progressing  Goal: Optimal Comfort and Wellbeing  Outcome: Progressing  Goal: Readiness for Transition of Care  Outcome: Progressing     Problem: Skin Injury Risk Increased  Goal: Skin Health and Integrity  Outcome: Progressing     Problem: Fall Injury Risk  Goal: Absence of Fall and Fall-Related Injury  Outcome: Progressing     Problem: Gas Exchange Impaired  Goal: Optimal Gas Exchange  Outcome: Progressing

## 2025-06-12 NOTE — PLAN OF CARE
Problem: Breathing Pattern Ineffective  Goal: Effective Breathing Pattern  Outcome: Progressing     Problem: Airway Clearance Ineffective  Goal: Effective Airway Clearance  Outcome: Progressing     Problem: Gas Exchange Impaired  Goal: Optimal Gas Exchange  Outcome: Progressing

## 2025-06-12 NOTE — PLAN OF CARE
OT re-eval performed. Goals remain appropriate.      Problem: Occupational Therapy  Goal: Occupational Therapy Goal  Description: Goals to be met by: 7//10/2025     Patient will increase functional independence with ADLs by performing:    UE Dressing with Set-up Assistance.  LE Dressing with SBA using appropriate AE as needed.  Grooming while seated at sink with Set-up Assistance.  Toileting from 3-in-1 commode over toilet with Stand-by Assistance for hygiene and clothing management.   Supine to sit with Mod I.   Step transfer with Stand-by Assistance with RW.  Toilet transfer to 3-in-1 commode over toilet with Stand-by Assistance with RW.    Outcome: Progressing

## 2025-06-12 NOTE — PT/OT/SLP PROGRESS
Physical Therapy      Patient Name:  Jordin Allen Jr.   MRN:  [id number]    Patient not seen today secondary to Patient unwilling to participate. Will follow-up

## 2025-06-12 NOTE — PT/OT/SLP RE-EVAL
Physical Therapy Re-evaluation    Patient Name:  Jordin Allen Jr.   MRN:  [id number]    Recommendations:     Discharge Recommendations: Moderate Intensity Therapy  Discharge Equipment Recommendations: other (see comments) (TBD)   Barriers to discharge: Inaccessible home    Assessment:     Jordin Allen Jr. is a 79 y.o. male admitted with a medical diagnosis of Acute on chronic respiratory failure with hypoxia.  He presents with the following impairments/functional limitations: weakness, impaired endurance, impaired self care skills, impaired functional mobility, gait instability, decreased lower extremity function Tolerated tx well. Patient very motivated to participate in therapy.    Rehab Prognosis:  good; patient would benefit from acute skilled PT services to address these deficits and reach maximum level of function.      Recent Surgery: * No surgery found *      Plan:     During this hospitalization, patient to be seen 5 x/week to address the above listed problems via gait training, therapeutic activities, therapeutic exercises, neuromuscular re-education, wheelchair management/training  Plan of Care Expires:     Plan of Care Reviewed with: patient    Subjective     Communicated with nurse prior to session.  Patient found HOB elevated with telemetry, blood pressure cuff, pulse ox (continuous), oxygen, peripheral IV upon PT entry to room, agreeable to evaluation.      Chief Complaint: no co pain. Feels weak and wants to start walking.  Patient comments/goals: To get OOB more and to start walking.   Pain/Comfort:  Pain Rating 1: 0/10    Patients cultural, spiritual, Amish conflicts given the current situation:        Objective:     Patient found with: telemetry, blood pressure cuff, pulse ox (continuous), oxygen, peripheral IV     General Precautions: Standard, fall, aspiration  Orthopedic Precautions: spinal precautions  Braces: TLSO (when out of bed)  Respiratory Status: Nasal cannula, flow 2  L/min    Exams:  Cognitive Exam:  Patient is oriented to Person, Place, and Time  Gross Motor Coordination:  WFL grossly weak 4/5 B LE, ROM WFL    Functional Mobility:  Bed Mobility:     Rolling Left:  supervision  Rolling Right: supervision  Scooting: moderate assistance  Supine to Sit: moderate assistance  Sit to Supine: moderate assistance  Transfers:     Sit to Stand:  maximal assistance with hand-held assist    AM-PAC 6 CLICK MOBILITY  Total Score:10       Treatment and Education:   Functional mobility as per above. Patient performed supine AAROM x 20 reps each to B LE hip flexion, ABD/ADD, knee flexion/ extension, ankle DF/PF.   Patient sat EOB with CGA for leticia 10 minutes with TLSO donned. Fair sitting balance. Patient returned to bed with mod a.     Patient left HOB elevated with all lines intact and call button in reach.    GOALS:   Multidisciplinary Problems       Physical Therapy Goals          Problem: Physical Therapy    Goal Priority Disciplines Outcome Interventions   Physical Therapy Goal     PT, PT/OT Progressing    Description: Goals to be met by: DC     Patient will increase functional independence with mobility by performin. Supine to sit with Chaves  2. Sit to supine with Chaves  3. Sit to stand transfer with Supervision with RW  4. Gait  x 150 feet with Contact Guard Assistance using Rolling Walker.                          DME Justifications:  No DME recommended requiring DME justifications    History:     Past Medical History:   Diagnosis Date    Benign neoplasm of colon 10/01/2013    Bronchitis chronic     CAD (coronary artery disease) 10/31/2013    Cataract     COPD (chronic obstructive pulmonary disease)     Emphysema of lung     Encounter for preventive health examination 2018    GERD (gastroesophageal reflux disease)     Glaucoma 2010    Hearing loss     Heart attack     Hx of colonic polyps     Hypertension     NAFLD (nonalcoholic fatty liver disease)  03/27/2017    SHOLA on CPAP     RLS (restless legs syndrome)     Screen for colon cancer 08/30/2013       Past Surgical History:   Procedure Laterality Date    bilateral foot surgery  1993    CARDIAC CATHETERIZATION  2013    x1    CATARACT EXTRACTION, BILATERAL      COLON SURGERY  2013    Ace Mott MD    COLONOSCOPY N/A 03/21/2018    Procedure: COLONOSCOPY;  Surgeon: Terry Mott MD;  [address]  Service: Endoscopy;  Laterality: N/A;    COLONOSCOPY N/A 04/12/2019    Procedure: COLONOSCOPY;  Surgeon: John Mantilla MD;  Location: [address]);  Service: Endoscopy;  Laterality: N/A;    COLONOSCOPY N/A 05/31/2022    Procedure: COLONOSCOPY;  Surgeon: MEDINA Farris MD;  Location: [facility] ([address] [place] [address]);  Service: Endoscopy;  Laterality: N/A;  fully vacc-inst portal-tb    ENDOBRONCHIAL ULTRASOUND Bilateral 04/30/2024    Procedure: ENDOBRONCHIAL ULTRASOUND (EBUS);  Surgeon: Naveed Aguero MD;  Location: [facility] OR;  Service: Pulmonary;  Laterality: Bilateral;    EYE SURGERY  2011    scrotal abscess removal      TYMPANOSTOMY TUBE PLACEMENT  2017       Time Tracking:     PT Received On: 06/12/25  PT Start Time: 0905     PT Stop Time: 0930  PT Total Time (min): 25 min  +20 back to bed transfer     Billable Minutes: Re-eval 15 and Therapeutic Exercise 10      06/12/2025

## 2025-06-12 NOTE — PLAN OF CARE
Problem: Physical Therapy  Goal: Physical Therapy Goal  Description: Goals to be met by: DC     Patient will increase functional independence with mobility by performin. Supine to sit with [place]att  2. Sit to supine with Piatt  3. Sit to stand transfer with Supervision with RW  4. Gait  x 150 feet with Contact Guard Assistance using Rolling Walker.     Outcome: Progressing

## 2025-06-12 NOTE — PT/OT/SLP RE-EVAL
Occupational Therapy   Re-evaluation    Name: Jordin Allen Jr.  MRN: [id number]  Admitting Diagnosis:  Acute on chronic respiratory failure with hypoxia  Recent Surgery: * No surgery found *      Recommendations:     Discharge Recommendations: Moderate Intensity Therapy  Discharge Equipment Recommendations: other (see comments) (TBD)  Barriers to discharge:  Decreased caregiver support, Other (Comment) (increased assistance needed)    Assessment:     Jordin Allen Jr. is a 79 y.o. male with a medical diagnosis of Acute on chronic respiratory failure with hypoxia.  He presents with performance deficits affecting function are weakness, impaired endurance, impaired self care skills, impaired functional mobility, gait instability, impaired balance, pain, impaired cardiopulmonary response to activity, decreased lower extremity function, decreased upper extremity function.      Rehab Prognosis:  fair; patient would benefit from acute skilled OT services to address these deficits and reach maximum level of function.       Plan:     Patient to be seen 5 x/week to address the above listed problems via self-care/home management, therapeutic activities, therapeutic exercises  Plan of Care Expires: 07/10/25  Plan of Care Reviewed with: patient    Subjective     Chief Complaint: 23  Patient/Family stated goals: to go home  Communicated with: nurse prior to session.  Pain/Comfort:  Pain Rating 1: 4/10  Location - Side 1: Right  Location - Orientation 1: generalized  Location 1: hip  Pain Addressed 1: Reposition, Distraction  Pain Rating Post-Intervention 1: 4/10    Objective:     Communicated with: nurse prior to session.  Patient found HOB elevated with: bed alarm, telemetry, blood pressure cuff, pulse ox (continuous), peripheral IV, oxygen upon OT entry to room.    General Precautions: Standard, fall, aspiration  Orthopedic Precautions: spinal precautions  Braces: TLSO (pt declined to brittany TLSO)  Respiratory Status: Nasal  cannula, flow 1 L/min    Occupational Performance:    Bed Mobility:    Patient completed Rolling/Turning to Right with stand by assistance and contact guard assistance  Patient completed Scooting/Bridging with minimum assistance  Patient completed Supine to Sit with moderate assistance    Functional Mobility/Transfers:  Patient completed Sit <> Stand Transfer with moderate assistance  with  no assistive device   Patient completed Bed <> Chair Transfer using Step Transfer technique with moderate assistance with no assistive device  Functional Mobility: NT    Activities of Daily Living:  Lower Body Dressing: dependence socks    Cognitive/Visual Perceptual:  AO4    Physical Exam:  BUE AROM/strength grossly WFL  Poor activity tolerance  Sit balance good, stand balance poor+/fair-    AMPAC 6 Click:  AMPAC Total Score: 14    Treatment & Education:  OT reeval performed post return from interrupted stay at acute.Pt sat EOB ~15 minutes prior to tf to chair. Then sit to stand from chair 3 trials for postioning. Pt required rest breaks between all tasks 2/2 increased work of breathing.    Patient left up in chair with all lines intact, call button in reach, and nurse notified    GOALS:   Multidisciplinary Problems       Occupational Therapy Goals          Problem: Occupational Therapy    Goal Priority Disciplines Outcome Interventions   Occupational Therapy Goal     OT, PT/OT Progressing    Description: Goals to be met by: 7/3/2025     Patient will increase functional independence with ADLs by performing:    UE Dressing with Set-up Assistance.  LE Dressing with SBA using appropriate AE as needed.  Grooming while seated at sink with Set-up Assistance.  Toileting from 3-in-1 commode over toilet with Stand-by Assistance for hygiene and clothing management.   Supine to sit with Mod I.   Step transfer with Stand-by Assistance with RW.  Toilet transfer to 3-in-1 commode over toilet with Stand-by Assistance with RW.                          DME Justifications:  No DME recommended requiring DME justifications    History:     Past Medical History:   Diagnosis Date    Benign neoplasm of colon 10/01/2013    Bronchitis chronic     CAD (coronary artery disease) 10/31/2013    Cataract 2008    COPD (chronic obstructive pulmonary disease)     Emphysema of lung     Encounter for preventive health examination 03/21/2018    GERD (gastroesophageal reflux disease)     Glaucoma 2010    Hearing loss 2015    Heart attack 2013    Hx of colonic polyps     Hypertension     NAFLD (nonalcoholic fatty liver disease) 03/27/2017    SHOLA on CPAP     RLS (restless legs syndrome)     Screen for colon cancer 08/30/2013         Past Surgical History:   Procedure Laterality Date    bilateral foot surgery  1993    CARDIAC CATHETERIZATION  2013    x1    CATARACT EXTRACTION, BILATERAL      COLON SURGERY  2013    Ace Mott MD    COLONOSCOPY N/A 03/21/2018    Procedure: COLONOSCOPY;  Surgeon: Terry Mott MD;  Location: [facility] ([address] [place] [address]);  Service: Endoscopy;  Laterality: N/A;    COLONOSCOPY N/A 04/12/2019    Procedure: COLONOSCOPY;  Surgeon: John Mantilla MD;  Location: [address]);  Service: Endoscopy;  Laterality: N/A;    COLONOSCOPY N/A 05/31/2022    Procedure: COLONOSCOPY;  Surgeon: MEDINA Farris MD;  Location: [facility] ([address] [place] [address]);  Service: Endoscopy;  Laterality: N/A;  fully vacc-inst portal-tb    ENDOBRONCHIAL ULTRASOUND Bilateral 04/30/2024    Procedure: ENDOBRONCHIAL ULTRASOUND (EBUS);  Surgeon: Naveed Aguero MD;  Location: [facility];  Service: Pulmonary;  Laterality: Bilateral;    EYE SURGERY  2011    scrotal abscess removal      TYMPANOSTOMY TUBE PLACEMENT  2017       Time Tracking:     OT Date of Treatment: 06/12/25  OT Start Time: 1130  OT Stop Time: 1203  OT Total Time (min): 33 min    Billable Minutes:Re-eval 10  Therapeutic Activity 23 6/12/2025

## 2025-06-12 NOTE — PROGRESS NOTES
[facility] Medicine   Progress Note  Room: 212/212   Patient Name: Jordin Allen Jr.  MRN: [id number]  Admit Date: 6/3/2025   Length of Stay:  LOS: 9 days   Attending Physician: Bernardino Guthrie MD  Nurse Practitioner: Ernestine Palomino NP    Date of Service: 06/12/2025    Principal Problem:    Acute on chronic respiratory failure with hypoxia    Brief HPI:     Jordin Allen Jr. is a 79 y.o. 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  He presented to [facility] ED on 01/22/2025 with complaints of shortness of breath and left foot redness.  Admitted to hospital medicine team for left foot cellulitis and acute hypoxic respiratory failure secondary to pneumonia/COPD exacerbation. CTA chest negative for PE. Emphysematous change with superimposed edema. Nodular focus within the anterior aspect of left upper lobe.  Started on nebs, prednisone, and empiric vanc/cefepime and doxycycline.  Respiratory panel positive for parainfluenza virus.  Course further complicated by sinus tach with PACs/18, for which he was started on diltiazem.  Blood cultures positive for staph, suspected to be contaminant.  Repeat blood cultures returned negative.  He will switch to Augmentin and doxy.  Ongoing episodes of SVT, thought to be due to underlying hypoxia.  He was able to be weaned down to high-flow nasal cannula 15 L.  Discharged to [facility] on 06/03/2025 for rehab, wound care, and O2 weaning.     Interval History    Returned from [facility] yesterday. Had been sent out on 6/9 for concerns of MI. Workup revealed findings concerning for pneumonia on CT. Started on empiric vanc and zosyn. Also started on 5 day course of prednisone for COPD exacerbation.  Continuing Zosyn here to complete 10 day course. Discussed with attending MD, unlikely MRSA pneumonia.  Afebrile and without leukocytosis. Will hold off on Vanc for now.  Sitting up in chair this morning, on 1L n/c  Patient discussed during interdisciplinary team meeting today with attending physician, , nutrition, therapy, respiratory, nursing, pharmacist, and wound care.  Anticipated d/c 6/24 IPR vs home        Subjective:     Review of Systems   Constitutional:  Positive for malaise/fatigue.   Respiratory:  Positive for cough, sputum production and shortness of breath.    Cardiovascular:  Positive for chest pain. Negative for leg swelling.   Gastrointestinal:  Negative for heartburn, nausea and vomiting.   Genitourinary:  Negative for dysuria and urgency.   Musculoskeletal:  Positive for joint pain (R shoulder). Negative for myalgias.   Neurological:  Positive for weakness. Negative for dizziness.   Psychiatric/Behavioral:  Negative for depression. The patient does not have insomnia.          Objective:     Vital Sign Range  Temp:  [97 °F (36.1 °C)-97.5 °F (36.4 °C)]   Pulse:  [63-86]   Resp:  [16-22]   BP: ()/(58-74)   SpO2:  [90 %-100 %]     Body mass index is 28.45 kg/m².  Oxygen Concentration (%):  [21-28] 24        Intake/Output Summary (Last 24 hours) at 6/12/2025 1014  Last data filed at 6/12/2025 0229  Gross per 24 hour   Intake 0 ml   Output 700 ml   Net -700 ml       Physical Exam  Constitutional:       Appearance: He is ill-appearing.   HENT:      Head: Normocephalic and atraumatic.      Mouth/Throat:      Mouth: Mucous membranes are moist.      Pharynx: Oropharynx is clear.   Eyes:      Extraocular Movements: Extraocular movements intact.      Pupils: Pupils are equal, round, and reactive to light.   Cardiovascular:      Rate and Rhythm: Normal rate. Rhythm irregular.   Pulmonary:      Breath sounds: Wheezing and rhonchi present.   Abdominal:      General: Bowel sounds are normal.      Palpations: Abdomen is soft.   Musculoskeletal:      Right lower leg: Edema present.      Left lower  leg: Edema present.   Skin:     General: Skin is warm and dry.   Neurological:      Mental Status: He is alert and oriented to person, place, and time. Mental status is at baseline.   Psychiatric:         Mood and Affect: Affect is flat.         Wounds       Wound 05/19/25 0233 Moisture associated dermatitis Left dorsal Foot (Active)   05/19/25 0233 Foot   Present on Original Admission: Y   Primary Wound Type: Moisture ass   Side: Left   Orientation: dorsal        Wound 05/19/25 0231 Moisture associated dermatitis Left lateral Foot (Active)   05/19/25 0231 Foot   Present on Original Admission: Y   Primary Wound Type: Moisture ass   Side: Left   Orientation: lateral        Wound 05/19/25 0231 Pressure Injury Right anterior Ankle (Active)   05/19/25 0231 Ankle   Present on Original Admission: Y   Primary Wound Type: Pressure inj   Side: Right   Orientation: anterior        Wound 05/19/25 0233 Moisture associated dermatitis Left anterior Foot (Active)   05/19/25 0233 Foot   Present on Original Admission:    Primary Wound Type: Moisture ass   Side: Left   Orientation: anterior        Wound 06/04/25 1030 Moisture associated dermatitis Right lateral Abdomen (Active)   06/04/25 1030 Abdomen   Present on Original Admission: Y   Primary Wound Type: Moisture ass   Side: Right   Orientation: lateral        Wound 06/04/25 1030 Moisture associated dermatitis Left lateral Abdomen (Active)   06/04/25 1030 Abdomen   Present on Original Admission: Y   Primary Wound Type: Moisture ass   Side: Left   Orientation: lateral        Wound 06/04/25 1030 Moisture associated dermatitis Right Groin (Active)   06/04/25 1030 Groin   Present on Original Admission: Y   Primary Wound Type: Moisture ass   Side: Right        Wound 06/04/25 1030 Moisture associated dermatitis Left Groin (Active)   06/04/25 1030 Groin   Present on Original Admission: Y   Primary Wound Type: Moisture ass   Side: Left        Wound 06/04/25 1030 Moisture associated  "dermatitis Sacral spine (Active)   06/04/25 1030 Sacral spine   Present on Original Admission: Y   Primary Wound Type: Moisture ass         Wounds consistently followed by [facility] NP Sheree Tao     Labs:  Recent Labs   Lab 06/09/25  0905 06/10/25  0330 06/11/25  0441   WBC 15.55* 12.20 11.82   HGB 11.9* 11.6* 10.9*   HCT 36.8* 36.6* 33.3*   * 130* 143*     Recent Labs   Lab 06/09/25  0553 06/09/25  0905 06/10/25  0330 06/11/25  0442   * 135* 134* 135*   K 3.6 3.5 4.0 3.1*   CL 87* 88* 92* 94*   CO2 32* 34* 29 30*   BUN 25* 26* 34* 34*   CREATININE 0.9 0.9 0.9 0.9   GLU 98 109 150* 162*   CALCIUM 9.0 9.0 9.0 8.3*   MG 1.9  --  2.1 2.1   PHOS 2.4*  --  2.6* 3.2     Recent Labs   Lab 06/09/25  0905 06/10/25  0330 06/11/25  0442   ALKPHOS 124 143 116   ALT 62* 125* 157*   AST 35 136* 121*   ALBUMIN 2.3* 2.2* 1.9*   PROT 6.2 6.2 5.1*   BILITOT 0.7 0.6 0.5       No results for input(s): "POCTGLUCOSE" in the last 72 hours.      Meds Scheduled:   amitriptyline  25 mg Oral QHS    ammonium lactate   Topical (Top) Daily    apixaban  5 mg Oral BID    aspirin  81 mg Oral QAM    atorvastatin  80 mg Oral Daily    [START ON 6/13/2025] diltiaZEM  180 mg Oral Daily    diltiaZEM  60 mg Oral Q8H    fluticasone propionate  2 spray Each Nostril Daily    guaiFENesin  1,200 mg Oral BID    levalbuterol  1.2495 mg Nebulization Q4H    levETIRAcetam  500 mg Oral BID    mirtazapine  15 mg Oral QHS    pantoprazole  40 mg Oral Daily    piperacillin-tazobactam (Zosyn) IV (PEDS and ADULTS) (extended infusion is not appropriate)  4.5 g Intravenous Q8H    predniSONE  50 mg Oral Daily    roflumilast  500 mcg Oral Daily    sodium chloride 3%  4 mL Nebulization BID    tiotropium bromide  2 puff Inhalation Daily       Current Inpatient Problem List:  Active Hospital Problems    Diagnosis  POA    *Acute on chronic respiratory failure with hypoxia [J96.21]  Yes    Parainfluenza virus infection [B34.8]  Yes    Pneumonia of right lung " due to infectious organism [J18.9]  Yes    Cellulitis [L03.90]  Yes    TIA (transient ischemic attack) [G45.9]  Yes     ASA and statin      Metastasis to brain [C79.31]  Yes    Adenocarcinoma, lung, 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       CAD (coronary artery disease) [I25.10]  Yes     Chronic     -[facility] (10/31/13) for stemi: pLAD 100%, Cx with Li's, mRCA 40%, LVEF 55%,. LVEDP 15   -Xience 3.0 x 33 mm to pLAD, post dilated with 3.5 NC and 4.0 NC.   -TTE (8/14/13): LVEF 55%, grade I diastolic dysfunction      HTN (hypertension), benign [I10]  Yes    SHOLA (obstructive sleep apnea) [G47.33]  Yes     CPAP at night      GERD (gastroesophageal reflux disease) [K21.9]  Yes     Continue PPI        Resolved Hospital Problems   No resolved problems to display.         Assessment / Plan:     Acute respiratory failure with hypoxia  COPD with acute exacerbation  Parainfluenza virus infection  Pneumonia   On Symbicort Spiriva and Daliresp at home. Follows up with pulmonology outpatient.   Completed 7 day course of Augmentin and doxycycline on 06/01  Continue Daliresp 500mcg daily  Continue Xopenex p.r.n.   Continue weaning high-flow nasal cannula  Continue guaifenesin 1200 mg b.i.d.  Returned from [facility] yesterday. Had been sent out on 6/9 for concerns of MI. Workup revealed findings concerning for pneumonia on CT. Started on empiric vanc and zosyn. Also started on 5 day course of prednisone for COPD exacerbation.  Continuing Zosyn here to complete 10 day course. Discussed with attending MD, unlikely MRSA pneumonia. Afebrile and without leukocytosis. Will hold off on Vanc for now.    Chest pain, new   Resolved   Evaluated by cardiology while in the hospital  Will need outpatient follow up with Dr. Ba with Cardiology     Cellulitis   Wound followed and managed by consistent,  contracted [facility] NP  Completed 7 day course of doxy and Augmentin  Will need follow up with Podiatry after discharge     Restless leg syndrome   Continue amitriptyline 25 mg nightly     Adenocarcinoma, lung, left   Metastasis to brain  Completed treatment with chemo/XRT. Brain XRT for lesionsx2. off immunotherapy since 12/24  suspect the LLL area was consolidation of prior RXT changes and not malignancy and is nearly resolved.  CT on presentation with new SANJAY nodule, need op follow up with pulm  Continue prophylactic Keppra 500 mg b.i.d.    Right shoulder pain  Having some shoulder pain to his right shoulder, which seems to be some muscle spasms.    Started Robaxin 500 mg q.i.d. p.r.n. on 6/6    Compression fracture of L1 vertebrae with routine healing   TLSO brace when out of bed  F/u with Dr. Hopkins     Dyslipidemia  TIA  CAD   Continue aspirin   Continue atorvastatin 80 mg daily     Tachycardia   Episodes of SVT while at the hospital   Evaluated by Cardiology   Likely worsened by underlying hypoxia   Supplemental O2   Continue diltiazem 60 mg q.8 hours for today, then switch to diltiazem 24 hour capsule 180 mg daily starting tomorrow per cardiology recommendations     SHOLA   Continue CPAP nightly     GERD  Continue PPI daily     Left lateral foot wound   Right anterior ankle wound   Traumatic left dorsal foot wound   Left anterior foot venous ulcer   Skin tear right distal arm   Pressure injury nose  Wound followed and managed by consistent, contracted [facility] NP    Advance Care Planning, 06/05/2025  This was a voluntary visit and the option to decline counseling was given  Length of discussion:  16 minutes  Life limiting problem:  Respiratory failure, adenocarcinoma  Topics discussed:  Code status  Outcome of discussion:  Patient would like to remain a full code.  In the event that he is not able to make his own decisions, he would like to defer decision making to his daughter  Decision Maker:Jordin  [facility]     Psychiatric Assessment  Patient Health Questionnaire-9 (PHQ-9)    Date:  06/05/2025  Total Score: 15  Screening is Positive   Diagnosis and Treatment Plan:  Moderate MDD   Continue amitriptyline 25 mg nightly   Increasing mirtazapine to 15 mg nightly, which will hopefully help with his appetite as well       Diet:  Cardiac   GI Ppx:  PPI   DVT Ppx:  Enoxaparin     Lines:  PIV   Drains:  None   Airways:n/a   Wounds:  Multiple    Goals of Care:   Restorative,  Treat infection  Optimize nutrition  Wound healing  Muscle strengthening  Restoration of ADL's  Improve mobility    Anticipated Disposition:    Anticipated d/c 6/24 IPR vs home    Follow up appointments:   Future Appointments   Date Time Provider Department Center   6/24/2025  7:15 AM [facility] OI-MRI2 [facility]   6/24/2025  9:30 AM Steven Campos MD [facility] NEUROSC [place]   6/24/2025  2:00 PM Bienvenido Henson MD [facility] RADONC3 [place]   7/1/2025 10:30 AM [facility] OIC-XRAY [facility] XRAY [facility]   7/1/2025 11:30 AM Bolivar Sr PA [facility] NEUROS8 [facility]   7/8/2025  9:15 AM MEEKS, VISUAL FIELDS [facility] OPHTHAL [facility]   7/8/2025 10:00 AM Cara Looney MD [facility] OPHTHAL [facility]   7/15/2025 10:00 AM Garrett Lloyd DPM [facility] POD [facility]   7/23/2025 10:15 AM CV [facility] ECHO [facility] [place]   8/15/2025  9:20 AM Bienvenido Ward MD [facility] PULMON [place]   8/27/2025  9:00 AM Guido Trejo MD [facility] ENT [facility]   9/8/2025 11:10 AM Yan Palmer MD [facility] DERM [facility]         Ernestine Palomino NP  Hospital Medicine [facility]    I have spent 78 minutes reviewing patient records, examining, and  of the patient/ patient's family with greater than 50% of the time spent with direct patient care and coordination.

## 2025-06-12 NOTE — PLAN OF CARE
[facility]  Interdisciplinary Team Conference        Jordin Allen Jr.    Conference Date: 6/12/2025  Admit Date: 6/3/2025       Active Hospital Problems    Diagnosis    *Acute on chronic respiratory failure with hypoxia    Parainfluenza virus infection    Pneumonia of right lung due to infectious organism    Cellulitis    TIA (transient ischemic attack)     ASA and statin      Metastasis to brain    Adenocarcinoma, lung, left    Dyslipidemia    COPD with acute exacerbation     Taking symbicort and spiriva and daliresp  Peak flow 270 l/min In April his Fev-1: 1.33 liters 47%.       CAD (coronary artery disease)     -[facility] (10/31/13) for stemi: pLAD 100%, Cx with Li's, mRCA 40%, LVEF 55%,. LVEDP 15   -Xience 3.0 x 33 mm to pLAD, post dilated with 3.5 NC and 4.0 NC.   -TTE (8/14/13): LVEF 55%, grade I diastolic dysfunction      HTN (hypertension), benign    SHOLA (obstructive sleep apnea)     CPAP at night      GERD (gastroesophageal reflux disease)     Continue PPI          Code Status: Full Code  Advance Directive  (If Adv Dir status is received, view document under Adv Dir in header or Chart Review Media tab): Patient does not have Advance Directive, declines information.        Power of /Surrogate Decision Maker: N/A    LAB/TEST/TREATMENTS:    POCT Glucose   Date Value Ref Range Status   06/08/2025 145 (H) 70 - 110 mg/dL Final   05/16/2025 98 70 - 110 mg/dL Final   05/16/2025 83 70 - 110 mg/dL Final      Lab Results   Component Value Date    INR 1.0 02/07/2025    INR 1.0 02/07/2025    INR 1.0 02/07/2025     Lab Results   Component Value Date    BUN 34 (H) 06/11/2025    BUN 34 (H) 06/10/2025    BUN 26 (H) 06/09/2025     Lab Results   Component Value Date    PH 7.499 (H) 06/09/2025    PCO2 56.3 (HH) 06/09/2025    PO2 79 (L) 06/09/2025    HCO3 43.8 (H) 06/09/2025     Lab Results   Component Value Date    WBC 11.82 06/11/2025    WBC 12.20 06/10/2025    WBC 15.55 (H) 06/09/2025      Susceptibility data from last 90 days.  Collected Specimen Info Organism Amp/Sulbactam Ampicillin Cefazolin CEFEPIME ETEST Ceftriaxone Ciprofloxacin Ertapenem Gentamicin LEVOFLOXACIN ETEST Meropenem Pip/Tazo Tobramycin Trimeth/Sulfa   05/27/25 Respiratory from Sputum Candida albicans                05/25/25 Respiratory from Sputum, Expectorated Candida albicans                05/24/25 Respiratory from Sputum, Expectorated Candida albicans                05/22/25 Blood from Peripheral, Hand, Right Coagulase-negative Staphylococcus species                05/05/25 Abscess from Groin Escherichia coli  R  R  R  S  S  S  S  S  S  S  S  S  R   05/05/25 Abscess from Groin Bacteroides ovatus                         Dialysis: N/A    Provider Comments/Recommendations: [place] [facility] [place] showed PNA added zosyn and vanc with 5 day steroids. Zosyn 10 day course    DIAGNOSTIC TEST    Radiology (Last 168 hours)               06/11 1100 Echo       06/11 0731 Cardiac monitoring strips     06/10 2312 Cardiac monitoring strips     06/10 1911 CTA Chest Non-Coronary (PE Studies)       06/10 1501 Cardiac monitoring strips     06/10 1159 Cardiac monitoring strips     06/10 0323 Cardiac monitoring strips     06/09 2021 Cardiac monitoring strips     06/09 1031 CTA Chest Non-Coronary (PE Studies)       06/09 0919 X-Ray Chest AP Portable                Echo    Left Ventricle: The left ventricle is normal in size. Ventricular mass   is normal. Normal wall thickness. There is low normal systolic function   with a visually estimated ejection fraction of 50 - 55%.    Inferolateral pseudodyskinesis    Right Ventricle: Right ventricle was not well visualized due to poor   acoustic window. The right ventricle is normal in size Systolic function   is mildly reduced.    IVC/SVC: Intermediate venous pressure at 8 mmHg.    RV function not well seen.       NURSING:    Cognitive/Neuro/Behavioral WDL: WDL  Level of Consciousness (AVPU):  alert  Arousal Level: opens eyes spontaneously  Orientation: oriented x 4  Speech: clear/fluent, follows commands    Fall Risk Score: 14  History Of Fall (W/I 3 Mos): N  Fall this admission: no    Restraints:         Infections:  No active infections  No active isolations      Telemetry: yes     Patient currently receiving pharmacological/non pharmacological interventions for pain: Yes     Urinary Incontinence: No  Bowel Incontinence: No         Peripheral IV - Single Lumen 06/10/25 1828 20 G 1/2 in Anterior;Right Wrist (Active)   Site Assessment Clean;Dry;Intact 06/12/25 0400   Line Securement Device Antimicrobial Adhesive 06/11/25 2000   Extremity Assessment Distal to IV No abnormal discoloration 06/11/25 2000   Line Status Saline locked 06/12/25 0400   Dressing Status Clean;Dry;Intact 06/12/25 0400   Dressing Intervention Integrity maintained 06/12/25 0400   Dressing Change Due 06/14/25 06/11/25 2000   Site Change Due 06/14/25 06/11/25 2000   Reason Not Rotated Not due 06/11/25 2000   Number of days: 1            Peripheral IV - Single Lumen 06/09/25 2000 20 G 1/2 in Left;Posterior Wrist (Active)   Site Assessment Clean;Dry;Intact 06/12/25 0400   Line Securement Device Secured with sutureless device 06/11/25 2000   Extremity Assessment Distal to IV No abnormal discoloration 06/11/25 2000   Line Status Saline locked 06/12/25 0400   Dressing Status Clean;Dry;Intact 06/12/25 0400   Dressing Intervention Integrity maintained 06/12/25 0400   Dressing Change Due 06/14/25 06/11/25 2000   Site Change Due 06/14/25 06/11/25 2000   Reason Not Rotated Not due 06/11/25 2000   Number of days: 2       Comments/Recommendations:  Last BM 6/11. PIV x2. Droplet precautions. 2 episodes of bradycardia overnight while asleep.     Wound Care:             Wound 05/19/25 0233 Moisture associated dermatitis Left dorsal Foot (Active)   05/19/25 0233 Foot   Present on Original Admission: Y   Primary Wound Type: Moisture ass   Side: Left    Orientation: dorsal   Wound Approximate Age at First Assessment (Weeks):    Wound Number:    Is this injury device related?: No   Incision Type:    Closure Method:    Wound Description (Comments):    Type:    Additional Comments:    Ankle-Brachial Index:    Pulses:    Removal Indication and Assessment:    Wound Outcome:    Wound Image   06/04/25 1030   Dressing Appearance Dry;Intact;Clean 06/11/25 2000   Drainage Amount None 06/06/25 1400   Drainage Characteristics/Odor No odor 06/06/25 1400   Appearance Dry 06/06/25 1400   Red (%), Wound Tissue Color 100 % 06/04/25 1030   Periwound Area [place] 06/06/25 1400   Care Povidone iodine 06/03/25 0800   Number of days: 24            Wound 06/04/25 1030 Moisture associated dermatitis Right lateral Abdomen (Active)   06/04/25 1030 Abdomen   Present on Original Admission: Y   Primary Wound Type: Moisture ass   Side: Right   Orientation: lateral   Wound Approximate Age at First Assessment (Weeks):    Wound Number:    Is this injury device related?:    Incision Type:    Closure Method:    Wound Description (Comments):    Type:    Additional Comments:    Ankle-Brachial Index:    Pulses:    Removal Indication and Assessment:    Wound Outcome:    Wound Image   06/09/25 1806   Dressing Appearance Open to air 06/11/25 2000   Drainage Amount None 06/07/25 1945   Red (%), Wound Tissue Color 100 % 06/04/25 1030   Periwound Area [place] 06/09/25 1207   Care Other (see comments) 06/08/25 1945   Dressing Other (comment) 06/08/25 1945   Number of days: 8            Wound 06/04/25 1030 Moisture associated dermatitis Left lateral Abdomen (Active)   06/04/25 1030 Abdomen   Present on Original Admission: Y   Primary Wound Type: Moisture ass   Side: Left   Orientation: lateral   Wound Approximate Age at First Assessment (Weeks):    Wound Number:    Is this injury device related?:    Incision Type:    Closure Method:    Wound Description (Comments):    Type:    Additional Comments:     Ankle-Brachial Index:    Pulses:    Removal Indication and Assessment:    Wound Outcome:    Wound Image   06/09/25 1806   Dressing Appearance Open to air 06/11/25 2000   Drainage Amount None 06/07/25 1945   Appearance Moist 06/04/25 1030   Red (%), Wound Tissue Color 100 % 06/04/25 1030   Periwound Area [place] 06/09/25 1207   Care Other (see comments) 06/08/25 1945   Dressing Other (comment) 06/08/25 1945   Number of days: 8            Wound 06/04/25 1030 Moisture associated dermatitis Right Groin (Active)   06/04/25 1030 Groin   Present on Original Admission: Y   Primary Wound Type: Moisture ass   Side: Right   Orientation:    Wound Approximate Age at First Assessment (Weeks):    Wound Number:    Is this injury device related?: No   Incision Type:    Closure Method:    Wound Description (Comments):    Type:    Additional Comments:    Ankle-Brachial Index:    Pulses:    Removal Indication and Assessment:    Wound Outcome:    Wound Image   06/09/25 1806   Dressing Appearance Open to air 06/11/25 2000   Drainage Amount None 06/11/25 0800   Red (%), Wound Tissue Color 100 % 06/04/25 1030   Periwound Area [place] 06/09/25 1208   Care Other (see comments) 06/08/25 1945   Dressing Other (comment) 06/08/25 1945   Number of days: 8            Wound 06/04/25 1030 Moisture associated dermatitis Left Groin (Active)   06/04/25 1030 Groin   Present on Original Admission: Y   Primary Wound Type: Moisture ass   Side: Left   Orientation:    Wound Approximate Age at First Assessment (Weeks):    Wound Number:    Is this injury device related?:    Incision Type:    Closure Method:    Wound Description (Comments):    Type:    Additional Comments:    Ankle-Brachial Index:    Pulses:    Removal Indication and Assessment:    Wound Outcome:    Wound Image   06/09/25 1807   Dressing Appearance Open to air 06/11/25 2000   Drainage Amount None 06/06/25 1400   Red (%), Wound Tissue Color 100 % 06/04/25 1030   Periwound Area [place] 06/09/25 1208    Care Other (see comments) 06/08/25 1945   Dressing Other (comment) 06/08/25 1945   Number of days: 8            Wound 06/04/25 1030 Moisture associated dermatitis Sacral spine (Active)   06/04/25 1030 Sacral spine   Present on Original Admission: Y   Primary Wound Type: Moisture ass   Side:    Orientation:    Wound Approximate Age at First Assessment (Weeks):    Wound Number:    Is this injury device related?:    Incision Type:    Closure Method:    Wound Description (Comments):    Type:    Additional Comments:    Ankle-Brachial Index:    Pulses:    Removal Indication and Assessment:    Wound Outcome:    Wound Image   06/09/25 1208   Dressing Appearance Open to air 06/11/25 2000   Drainage Amount None 06/06/25 1400   Drainage Characteristics/Odor No odor 06/04/25 1030   Appearance Moist 06/04/25 1030   Periwound Area [place] 06/09/25 1208   Care Wound cleanser 06/04/25 1030   Number of days: 8            Wound 06/09/25 Pressure Injury Nose (Active)   06/09/25  Nose   Present on Original Admission: Y   Primary Wound Type: Pressure inj   Side:    Orientation:    Wound Approximate Age at First Assessment (Weeks):    Wound Number:    Is this injury device related?:    Incision Type:    Closure Method:    Wound Description (Comments):    Type:    Additional Comments:    Ankle-Brachial Index:    Pulses:    Removal Indication and Assessment:    Wound Outcome:    Wound Image   06/09/25 1808   Dressing Appearance Open to air 06/11/25 2000   Number of days: 3        Anjum Score: 12     Skin Interventions: Device Skin Pressure Protection: absorbent pad utilized/changed  Pressure Reduction Devices: specialty bed utilized  Pressure Reduction Techniques: weight shift assistance provided     Comments/Recommendations:  MASD sacrum and buttocks. Dermatitis to feet. Right ankle stage 1    Respiratory:       Flow (L/min) (Oxygen Therapy): 1  Oxygen Concentration (%): 24          Vent Weaning: N/A    Comments/Recommendations:  1L NC  sats 98%. CPAP at night home unit. Receiving q4 treatments without issues    Nutrition:    Dietary Orders (From admission, onward)       Start     Ordered    06/05/25 2000  Dietary nutrition supplements Boost Plus Nutritional Drink - Rich Chocolate  2 times daily      Comments: CHOCOLATE   Question Answer Comment   Route: By Mouth    Select PO Supplement: Boost Plus Nutritional Drink - Rich Chocolate        06/05/25 1026    06/03/25 1626  Diet Heart Healthy  Diet effective now         06/03/25 1625                     Parenteral Nutrition: N/A    Wt Readings from Last 1 Encounters:   06/12/25 0558 87.4 kg (192 lb 10.9 oz)   06/09/25 0800 86.3 kg (190 lb 4.1 oz)   06/09/25 0615 87.9 kg (193 lb 12.6 oz)   06/08/25 0444 90.4 kg (199 lb 4.7 oz)   06/05/25 1032 96 kg (211 lb 10.3 oz)   06/05/25 0600 96 kg (211 lb 10.3 oz)   06/04/25 0800 96 kg (211 lb 10.3 oz)   06/03/25 1629 96 kg (211 lb 10.3 oz)   06/03/25 1555 86.8 kg (191 lb 5.8 oz)       Comments/Recommendations: regular diet intake varies. Current weight 192 lbs    Pharmacy:        amitriptyline  25 mg Oral QHS    ammonium lactate   Topical (Top) Daily    apixaban  5 mg Oral BID    aspirin  81 mg Oral QAM    atorvastatin  80 mg Oral Daily    [START ON 6/13/2025] diltiaZEM  180 mg Oral Daily    diltiaZEM  60 mg Oral Q8H    fluticasone propionate  2 spray Each Nostril Daily    guaiFENesin  1,200 mg Oral BID    levalbuterol  1.2495 mg Nebulization Q4H    levETIRAcetam  500 mg Oral BID    mirtazapine  15 mg Oral QHS    pantoprazole  40 mg Oral Daily    piperacillin-tazobactam (Zosyn) IV (PEDS and ADULTS) (extended infusion is not appropriate)  4.5 g Intravenous Q8H    predniSONE  50 mg Oral Daily    roflumilast  500 mcg Oral Daily    sodium chloride 3%  4 mL Nebulization BID    tiotropium bromide  2 puff Inhalation Daily      Antibiotics (From admission, onward)      Start     Stop Route Frequency Ordered    06/12/25 0945  piperacillin-tazobactam 4.5 g in sodium  chloride 0.9% 100 mL IVPB (ready to mix)         -- IV Every 8 hours 25 0930    25  mupirocin 2 % ointment         25 Nasl 2 times daily 25 1722           Antivirals (From admission, onward)      None             Current Facility-Administered Medications:     acetaminophen, 650 mg, Oral, Q8H PRN    albuterol sulfate, 0.6667 mg, Nebulization, Q6H PRN    calcium carbonate, 500 mg, Oral, TID PRN    hydrOXYzine HCL, 25 mg, Oral, TID PRN    melatonin, 6 mg, Oral, Nightly PRN    methocarbamoL, 500 mg, Oral, TID PRN    naloxone, 0.02 mg, Intravenous, PRN    nitroGLYCERIN, 0.4 mg, Sublingual, Q5 Min PRN    ondansetron, 4 mg, Oral, Q6H PRN    oxyCODONE-acetaminophen, 1 tablet, Oral, Q4H PRN    polyethylene glycol, 17 g, Oral, BID PRN    sodium chloride 0.9%, 10 mL, Intravenous, Q12H PRN     Comments/Recommendations: zosyn 10 day course stop date TBD     Physical Therapy:    Multidisciplinary Problems       Physical Therapy Goals          Problem: Physical Therapy    Goal Priority Disciplines Outcome Interventions   Physical Therapy Goal     PT, PT/OT Progressing    Description: Goals to be met by: DC     Patient will increase functional independence with mobility by performin. Supine to sit with Mullen  2. Sit to supine with Mullen  3. Sit to stand transfer with Supervision with RW  4. Gait  x 150 feet with Contact Guard Assistance using Rolling Walker.                            Comments/Recommendations:  eval this morning, looks beter. Mod ind with RW. Stood with max assist. Pt motivated    Occupational Therapy:    Multidisciplinary Problems       Occupational Therapy Goals          Problem: Occupational Therapy    Goal Priority Disciplines Outcome Interventions   Occupational Therapy Goal     OT, PT/OT Progressing    Description: Goals to be met by: 7/3/2025     Patient will increase functional independence with ADLs by performing:    UE Dressing with Set-up  Assistance.  LE Dressing with SBA using appropriate AE as needed.  Grooming while seated at sink with Set-up Assistance.  Toileting from 3-in-1 commode over toilet with Stand-by Assistance for hygiene and clothing management.   Supine to sit with Mod I.   Step transfer with Stand-by Assistance with RW.  Toilet transfer to 3-in-1 commode over toilet with Stand-by Assistance with RW.                         Comments/Recommendations:  eval today    Speech Therapy:    Multidisciplinary Problems       SLP Goals       Not on file                     Comments/Recommendations:  N/A    Discharge Planning:    Prior to hospitilization cognitive status:: Alert/Oriented      People in Home: child(selene), adult, other relative(s)           Discharge Plan A: Rehab, Skilled Nursing Facility   Discharge Plan B: Home with family, Home Health, New Nursing Home placement [facility]   DME Needed Upon Discharge : other (see comments) (TBD)        Future Appointments   Date Time Provider Department Center   6/24/2025  7:15 AM [facility] OI-MRI2 [facility] MRI [facility]   6/24/2025  9:30 AM Steven Campos MD [facility] NEUROSC [place]   6/24/2025  2:00 PM Bienvenido Henson MD [facility] RADONC3 [place]   7/1/2025 10:30 AM [facility] OI-XRAY [facility] XRAY [facility]   7/1/2025 11:30 AM Bolivar Sr PA [facility] NEUROS8 [facility]   7/8/2025  9:15 AM MEEKS, VISUAL FIELDS [facility] OPHTHAL [facility]   7/8/2025 10:00 AM Cara Looney MD [facility] OPHTHAL [facility]   7/15/2025 10:00 AM Garrett Lloyd DPM [facility] POD [address]   7/23/2025 10:15 AM [facility] ECHO [facility] CARDIA [place]   8/15/2025  9:20 AM Bienvenido Ward MD [facility] PULMON [place]   8/27/2025  9:00 AM Guido Trejo MD [facility] ENT [address]   9/8/2025 11:10 AM Yan Palmer MD [facility] DERM [facility]         Expected Discharge Date: 6/24/2025     Comments/Recommendations:  IPR vs SNF      Family Conference:    No    Attendees Present:    {Kathryn Abadie, RN CM Erika  Grzegorz, SALO Perkins, SALO Hager, CCC-SLP  Sonny Velez, RANDOLPH Villafana, RPJESSICA Guerrero, MD Ernestine Gaspar, ORACIO Christy, RRT  Kelly Broussard, SALO wound care  Jordin Cardona, Admin      Continued/Extended Stay Review:    Management of at least one of the following complex respiratory conditions:  Bronchodilators (excluding MDIs) greater than or equal to 4 times in 24 hours  Cardiac monitoring for dyspnea, electrolyte imbalances, post pacer insertion, significant arrhythmia, or syncope/pre-syncope    Must meet at least three of the following concomitant treatments/intervention daily unless notes: (excludes PO meds unless notes)  IV medication per therapeutic regimen  Bronchodilators  Cardiac Monitoring  Complex wound care  Laboratory assessment and medication adjustment(s)  Nebulizer treatments at least every 6 hours  Oxygen and SaO2/ABG adjustments and greater than or equal to 28% supplemental O2  Rehab Therapy (PT/OT/ST) 1-3 hours a day greater than or equal to 5 days a week

## 2025-06-13 LAB
ABSOLUTE NEUTROPHIL MANUAL (OHS): 10.5 K/UL
ALBUMIN SERPL BCP-MCNC: 2 G/DL (ref 3.5–5.2)
ALP SERPL-CCNC: 130 UNIT/L (ref 40–150)
ALT SERPL W/O P-5'-P-CCNC: 111 UNIT/L (ref 10–44)
ANION GAP (OHS): 10 MMOL/L (ref 8–16)
AST SERPL-CCNC: 43 UNIT/L (ref 11–45)
BILIRUB SERPL-MCNC: 0.3 MG/DL (ref 0.1–1)
BUN SERPL-MCNC: 37 MG/DL (ref 8–23)
CALCIUM SERPL-MCNC: 8.6 MG/DL (ref 8.7–10.5)
CHLORIDE SERPL-SCNC: 102 MMOL/L (ref 95–110)
CO2 SERPL-SCNC: 31 MMOL/L (ref 23–29)
CREAT SERPL-MCNC: 1.1 MG/DL (ref 0.5–1.4)
ERYTHROCYTE [DISTWIDTH] IN BLOOD BY AUTOMATED COUNT: 15.7 % (ref 11.5–14.5)
GFR SERPLBLD CREATININE-BSD FMLA CKD-EPI: >60 ML/MIN/1.73/M2
GLUCOSE SERPL-MCNC: 167 MG/DL (ref 70–110)
HCT VFR BLD AUTO: 31.1 % (ref 40–54)
HGB BLD-MCNC: 10 GM/DL (ref 14–18)
LYMPHOCYTES NFR BLD MANUAL: 8 % (ref 18–48)
MAGNESIUM SERPL-MCNC: 2.1 MG/DL (ref 1.6–2.6)
MCH RBC QN AUTO: 28.2 PG (ref 27–31)
MCHC RBC AUTO-ENTMCNC: 32.2 G/DL (ref 32–36)
MCV RBC AUTO: 88 FL (ref 82–98)
METAMYELOCYTES NFR BLD MANUAL: 1 %
MONOCYTES NFR BLD MANUAL: 2 % (ref 4–15)
MYELOCYTES NFR BLD MANUAL: 1 %
NEUTROPHILS NFR BLD MANUAL: 88 % (ref 38–73)
NUCLEATED RBC (/100WBC) (OHS): 0 /100 WBC
PHOSPHATE SERPL-MCNC: 2.1 MG/DL (ref 2.7–4.5)
PLATELET # BLD AUTO: 138 K/UL (ref 150–450)
PMV BLD AUTO: 10.6 FL (ref 9.2–12.9)
POTASSIUM SERPL-SCNC: 4 MMOL/L (ref 3.5–5.1)
PROT SERPL-MCNC: 5 GM/DL (ref 6–8.4)
RBC # BLD AUTO: 3.54 M/UL (ref 4.6–6.2)
SODIUM SERPL-SCNC: 143 MMOL/L (ref 136–145)
WBC # BLD AUTO: 11.92 K/UL (ref 3.9–12.7)

## 2025-06-13 PROCEDURE — 80053 COMPREHEN METABOLIC PANEL: CPT | Performed by: STUDENT IN AN ORGANIZED HEALTH CARE EDUCATION/TRAINING PROGRAM

## 2025-06-13 PROCEDURE — 97110 THERAPEUTIC EXERCISES: CPT

## 2025-06-13 PROCEDURE — 94761 N-INVAS EAR/PLS OXIMETRY MLT: CPT

## 2025-06-13 PROCEDURE — 99900035 HC TECH TIME PER 15 MIN (STAT)

## 2025-06-13 PROCEDURE — 94664 DEMO&/EVAL PT USE INHALER: CPT

## 2025-06-13 PROCEDURE — 83735 ASSAY OF MAGNESIUM: CPT | Performed by: STUDENT IN AN ORGANIZED HEALTH CARE EDUCATION/TRAINING PROGRAM

## 2025-06-13 PROCEDURE — 25000242 PHARM REV CODE 250 ALT 637 W/ HCPCS

## 2025-06-13 PROCEDURE — 63600175 PHARM REV CODE 636 W HCPCS: Performed by: STUDENT IN AN ORGANIZED HEALTH CARE EDUCATION/TRAINING PROGRAM

## 2025-06-13 PROCEDURE — 25000003 PHARM REV CODE 250: Performed by: STUDENT IN AN ORGANIZED HEALTH CARE EDUCATION/TRAINING PROGRAM

## 2025-06-13 PROCEDURE — 97530 THERAPEUTIC ACTIVITIES: CPT

## 2025-06-13 PROCEDURE — 97110 THERAPEUTIC EXERCISES: CPT | Mod: CO

## 2025-06-13 PROCEDURE — 36415 COLL VENOUS BLD VENIPUNCTURE: CPT | Performed by: STUDENT IN AN ORGANIZED HEALTH CARE EDUCATION/TRAINING PROGRAM

## 2025-06-13 PROCEDURE — 94640 AIRWAY INHALATION TREATMENT: CPT

## 2025-06-13 PROCEDURE — 27000221 HC OXYGEN, UP TO 24 HOURS

## 2025-06-13 PROCEDURE — 27000173 HC ACAPELLA DEVICE DH OR DM

## 2025-06-13 PROCEDURE — 84100 ASSAY OF PHOSPHORUS: CPT | Performed by: STUDENT IN AN ORGANIZED HEALTH CARE EDUCATION/TRAINING PROGRAM

## 2025-06-13 PROCEDURE — 97535 SELF CARE MNGMENT TRAINING: CPT | Mod: CO

## 2025-06-13 PROCEDURE — 11000001 HC ACUTE MED/SURG PRIVATE ROOM

## 2025-06-13 PROCEDURE — 25000003 PHARM REV CODE 250

## 2025-06-13 PROCEDURE — 85027 COMPLETE CBC AUTOMATED: CPT | Performed by: STUDENT IN AN ORGANIZED HEALTH CARE EDUCATION/TRAINING PROGRAM

## 2025-06-13 PROCEDURE — 94660 CPAP INITIATION&MGMT: CPT

## 2025-06-13 RX ADMIN — Medication: at 09:06

## 2025-06-13 RX ADMIN — LEVETIRACETAM 500 MG: 500 TABLET, FILM COATED ORAL at 08:06

## 2025-06-13 RX ADMIN — MIRTAZAPINE 15 MG: 15 TABLET, FILM COATED ORAL at 08:06

## 2025-06-13 RX ADMIN — LEVETIRACETAM 500 MG: 500 TABLET, FILM COATED ORAL at 09:06

## 2025-06-13 RX ADMIN — LEVALBUTEROL HYDROCHLORIDE 1.25 MG: 0.63 SOLUTION RESPIRATORY (INHALATION) at 12:06

## 2025-06-13 RX ADMIN — GUAIFENESIN 1200 MG: 600 TABLET, MULTILAYER, EXTENDED RELEASE ORAL at 09:06

## 2025-06-13 RX ADMIN — AMITRIPTYLINE HYDROCHLORIDE 25 MG: 25 TABLET, FILM COATED ORAL at 08:06

## 2025-06-13 RX ADMIN — PREDNISONE 50 MG: 20 TABLET ORAL at 09:06

## 2025-06-13 RX ADMIN — MELATONIN TAB 3 MG 6 MG: 3 TAB at 08:06

## 2025-06-13 RX ADMIN — PANTOPRAZOLE SODIUM 40 MG: 40 TABLET, DELAYED RELEASE ORAL at 09:06

## 2025-06-13 RX ADMIN — APIXABAN 5 MG: 5 TABLET, FILM COATED ORAL at 09:06

## 2025-06-13 RX ADMIN — Medication 4 ML: at 09:06

## 2025-06-13 RX ADMIN — PIPERACILLIN AND TAZOBACTAM 4.5 G: 4; .5 INJECTION, POWDER, FOR SOLUTION INTRAVENOUS at 02:06

## 2025-06-13 RX ADMIN — LEVALBUTEROL HYDROCHLORIDE 1.25 MG: 0.63 SOLUTION RESPIRATORY (INHALATION) at 03:06

## 2025-06-13 RX ADMIN — LEVALBUTEROL HYDROCHLORIDE 1.25 MG: 0.63 SOLUTION RESPIRATORY (INHALATION) at 09:06

## 2025-06-13 RX ADMIN — FLUTICASONE PROPIONATE 100 MCG: 50 SPRAY, METERED NASAL at 09:06

## 2025-06-13 RX ADMIN — DILTIAZEM HYDROCHLORIDE 180 MG: 180 CAPSULE, EXTENDED RELEASE ORAL at 09:06

## 2025-06-13 RX ADMIN — PIPERACILLIN AND TAZOBACTAM 4.5 G: 4; .5 INJECTION, POWDER, FOR SOLUTION INTRAVENOUS at 06:06

## 2025-06-13 RX ADMIN — TIOTROPIUM BROMIDE INHALATION SPRAY 2 PUFF: 3.12 SPRAY, METERED RESPIRATORY (INHALATION) at 09:06

## 2025-06-13 RX ADMIN — APIXABAN 5 MG: 5 TABLET, FILM COATED ORAL at 08:06

## 2025-06-13 RX ADMIN — LEVALBUTEROL HYDROCHLORIDE 1.25 MG: 0.63 SOLUTION RESPIRATORY (INHALATION) at 04:06

## 2025-06-13 RX ADMIN — METHOCARBAMOL 500 MG: 500 TABLET ORAL at 10:06

## 2025-06-13 RX ADMIN — ASPIRIN 81 MG: 81 TABLET, COATED ORAL at 09:06

## 2025-06-13 RX ADMIN — ATORVASTATIN CALCIUM 80 MG: 80 TABLET, FILM COATED ORAL at 09:06

## 2025-06-13 RX ADMIN — GUAIFENESIN 1200 MG: 600 TABLET, MULTILAYER, EXTENDED RELEASE ORAL at 08:06

## 2025-06-13 RX ADMIN — PIPERACILLIN AND TAZOBACTAM 4.5 G: 4; .5 INJECTION, POWDER, FOR SOLUTION INTRAVENOUS at 10:06

## 2025-06-13 RX ADMIN — ROFLUMILAST 500 MCG: 500 TABLET ORAL at 09:06

## 2025-06-13 NOTE — PROGRESS NOTES
[facility] Medicine   Progress Note  Room: 212/212   Patient Name: Jordin Allen Jr.  MRN: [id number]  Admit Date: 6/3/2025   Length of Stay:  LOS: 10 days   Attending Physician: Bernardino Guthrie MD  Nurse Practitioner: Ernestine Palomino NP    Date of Service: 06/13/2025    Principal Problem:    Acute on chronic respiratory failure with hypoxia    Brief HPI:     Jordin Allen Jr. is a 79 y.o. 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  [place] [facility] [place] with complaints of shortness of breath and left foot redness.  Admitted to hospital medicine team for left foot cellulitis and acute hypoxic respiratory failure secondary to pneumonia/COPD exacerbation. CTA chest negative for PE. Emphysematous change with superimposed edema. Nodular focus within the anterior aspect of left upper lobe.  Started on nebs, prednisone, and empiric vanc/cefepime and doxycycline.  Respiratory panel positive for parainfluenza virus.  Course further complicated by sinus tach with PACs/18, for which he was started on diltiazem.  Blood cultures positive for staph, suspected to be contaminant.  Repeat blood cultures returned negative.  He will switch to Augmentin and doxy.  Ongoing episodes of SVT, thought to be due to underlying hypoxia.  He was able to be weaned down to high-flow nasal cannula 15 L.  Discharged to [facility] on 06/03/2025 for rehab, wound care, and O2 weaning.     6/9-6/11-[place] [facility] [place] concerns of [place]. Workup revealed findings concerning for pneumonia on CT. Started on empiric vanc and zosyn. Also started on 5 day course of prednisone for COPD exacerbation.    Interval History    No issues overnight   Labs reviewed and listed below, no critical values  Remains afebrile   Continue 10 day course of Zosyn  O2  requirements are stable, on 1 L nasal cannula with O2 sats 93%      Subjective:     Review of Systems   Constitutional:  Positive for malaise/fatigue.   Respiratory:  Positive for cough, sputum production and shortness of breath.    Cardiovascular:  Positive for chest pain. Negative for leg swelling.   Gastrointestinal:  Negative for heartburn, nausea and vomiting.   Genitourinary:  Negative for dysuria and urgency.   Musculoskeletal:  Positive for joint pain (R shoulder). Negative for myalgias.   Neurological:  Positive for weakness. Negative for dizziness.   Psychiatric/Behavioral:  Negative for depression. The patient does not have insomnia.          Objective:     Vital Sign Range  Temp:  [98 °F (36.7 °C)-98.3 °F (36.8 °C)]   Pulse:  [66-94]   Resp:  [14-22]   BP: (113-147)/(62-73)   SpO2:  [93 %-99 %]     Body mass index is 28.64 kg/m².  Oxygen Concentration (%):  [24-28] 24        Intake/Output Summary (Last 24 hours) at 6/13/2025 0902  Last data filed at 6/13/2025 0829  Gross per 24 hour   Intake 1618.08 ml   Output 976 ml   Net 642.08 ml       Physical Exam  Constitutional:       Appearance: He is ill-appearing.   HENT:      Head: Normocephalic and atraumatic.      Mouth/Throat:      Mouth: Mucous membranes are moist.      Pharynx: Oropharynx is clear.   Eyes:      Extraocular Movements: Extraocular movements intact.      Pupils: Pupils are equal, round, and reactive to light.   Cardiovascular:      Rate and Rhythm: Normal rate. Rhythm irregular.   Pulmonary:      Breath sounds: Wheezing and rhonchi present.   Abdominal:      General: Bowel sounds are normal.      Palpations: Abdomen is soft.   Musculoskeletal:      Right lower leg: Edema present.      Left lower leg: Edema present.   Skin:     General: Skin is warm and dry.   Neurological:      Mental Status: He is alert and oriented to person, place, and time. Mental status is at baseline.   Psychiatric:         Mood and Affect: Affect is flat.          Wounds       Wound 05/19/25 0233 Moisture associated dermatitis Left dorsal Foot (Active)   05/19/25 0233 Foot   Present on Original Admission: Y   Primary Wound Type: Moisture ass   Side: Left   Orientation: dorsal        Wound 05/19/25 0231 Moisture associated dermatitis Left lateral Foot (Active)   05/19/25 0231 Foot   Present on Original Admission: Y   Primary Wound Type: Moisture ass   Side: Left   Orientation: lateral        Wound 05/19/25 0231 Pressure Injury Right anterior Ankle (Active)   05/19/25 0231 Ankle   Present on Original Admission: Y   Primary Wound Type: Pressure inj   Side: Right   Orientation: anterior        Wound 05/19/25 0233 Moisture associated dermatitis Left anterior Foot (Active)   05/19/25 0233 Foot   Present on Original Admission:    Primary Wound Type: Moisture ass   Side: Left   Orientation: anterior        Wound 06/04/25 1030 Moisture associated dermatitis Right lateral Abdomen (Active)   06/04/25 1030 Abdomen   Present on Original Admission: Y   Primary Wound Type: Moisture ass   Side: Right   Orientation: lateral        Wound 06/04/25 1030 Moisture associated dermatitis Left lateral Abdomen (Active)   06/04/25 1030 Abdomen   Present on Original Admission: Y   Primary Wound Type: Moisture ass   Side: Left   Orientation: lateral        Wound 06/04/25 1030 Moisture associated dermatitis Right Groin (Active)   06/04/25 1030 Groin   Present on Original Admission: Y   Primary Wound Type: Moisture ass   Side: Right        Wound 06/04/25 1030 Moisture associated dermatitis Left Groin (Active)   06/04/25 1030 Groin   Present on Original Admission: Y   Primary Wound Type: Moisture ass   Side: Left        Wound 06/04/25 1030 Moisture associated dermatitis Sacral spine (Active)   06/04/25 1030 Sacral spine   Present on Original Admission: Y   Primary Wound Type: Moisture ass         Wounds consistently followed by [facility] NP Sheree Tao     Labs:  Recent Labs   Lab  "06/10/25  0330 06/11/25 0441 06/13/25  0413   WBC 12.20 11.82 11.92   HGB 11.6* 10.9* 10.0*   HCT 36.6* 33.3* 31.1*   * 143* 138*     Recent Labs   Lab 06/10/25  0330 06/11/25 0442 06/13/25  0413   * 135* 143   K 4.0 3.1* 4.0   CL 92* 94* 102   CO2 29 30* 31*   BUN 34* 34* 37*   CREATININE 0.9 0.9 1.1   * 162* 167*   CALCIUM 9.0 8.3* 8.6*   MG 2.1 2.1 2.1   PHOS 2.6* 3.2 2.1*     Recent Labs   Lab 06/10/25  0330 06/11/25  0442 06/13/25  0413   ALKPHOS 143 116 130   * 157* 111*   * 121* 43   ALBUMIN 2.2* 1.9* 2.0*   PROT 6.2 5.1* 5.0*   BILITOT 0.6 0.5 0.3       No results for input(s): "POCTGLUCOSE" in the last 72 hours.      Meds Scheduled:   amitriptyline  25 mg Oral QHS    ammonium lactate   Topical (Top) Daily    apixaban  5 mg Oral BID    aspirin  81 mg Oral QAM    atorvastatin  80 mg Oral Daily    diltiaZEM  180 mg Oral Daily    fluticasone propionate  2 spray Each Nostril Daily    guaiFENesin  1,200 mg Oral BID    levalbuterol  1.2495 mg Nebulization Q4H    levETIRAcetam  500 mg Oral BID    mirtazapine  15 mg Oral QHS    pantoprazole  40 mg Oral Daily    piperacillin-tazobactam (Zosyn) IV (PEDS and ADULTS) (extended infusion is not appropriate)  4.5 g Intravenous Q8H    predniSONE  50 mg Oral Daily    roflumilast  500 mcg Oral Daily    sodium chloride 3%  4 mL Nebulization BID    tiotropium bromide  2 puff Inhalation Daily       Current Inpatient Problem List:  Active Hospital Problems    Diagnosis  POA    *Acute on chronic respiratory failure with hypoxia [J96.21]  Yes    Parainfluenza virus infection [B34.8]  Yes    Pneumonia of right lung due to infectious organism [J18.9]  Yes    Cellulitis [L03.90]  Yes    TIA (transient ischemic attack) [G45.9]  Yes     ASA and statin      Metastasis to brain [C79.31]  Yes    Adenocarcinoma, lung, left [C34.92]  Yes    Dyslipidemia [E78.5]  Yes    COPD with acute exacerbation [J44.1]  Yes     Taking symbicort and spiriva and " daliresp  Peak flow 270 l/min In April his Fev-1: 1.33 liters 47%.       CAD (coronary artery disease) [I25.10]  Yes     Chronic     -LHC (10/31/13) for stemi: pLAD 100%, Cx with Li's, mRCA 40%, LVEF 55%,. LVEDP 15   -Xience 3.0 x 33 mm to pLAD, post dilated with 3.5 NC and 4.0 NC.   -TTE (8/14/13): LVEF 55%, grade I diastolic dysfunction      HTN (hypertension), benign [I10]  Yes    SHOLA (obstructive sleep apnea) [G47.33]  Yes     CPAP at night      GERD (gastroesophageal reflux disease) [K21.9]  Yes     Continue PPI        Resolved Hospital Problems   No resolved problems to display.         Assessment / Plan:     Acute respiratory failure with hypoxia  COPD with acute exacerbation  Parainfluenza virus infection  Pneumonia   On Symbicort Spiriva and Daliresp at home. Follows up with pulmonology outpatient.   Completed 7 day course of Augmentin and doxycycline on 06/01  Continue Daliresp 500mcg daily  Continue Xopenex p.r.n.   Continue weaning high-flow nasal cannula  Continue guaifenesin 1200 mg b.i.d.  Continue 10 day course of Zosyn  O2 requirements are stable, on 1 L nasal cannula with O2 sats 93%    Chest pain, new   Resolved   Evaluated by cardiology while in the hospital  Will need outpatient follow up with Dr. Ba with Cardiology     Cellulitis   Wound followed and managed by consistent, contracted [facility] NP  Completed 7 day course of doxy and Augmentin  Will need follow up with Podiatry after discharge     Restless leg syndrome   Continue amitriptyline 25 mg nightly     Adenocarcinoma, lung, left   Metastasis to brain  Completed treatment with chemo/XRT. Brain XRT for lesionsx2. off immunotherapy since 12/24  suspect the LLL area was consolidation of prior RXT changes and not malignancy and is nearly resolved.  CT on presentation with new SANJAY nodule, need op follow up with pulm  Continue prophylactic Keppra 500 mg b.i.d.    Right shoulder pain  Having some shoulder pain to his right shoulder, which  seems to be some muscle spasms.    Started Robaxin 500 mg q.i.d. p.r.n. on 6/6    Compression fracture of L1 vertebrae with routine healing   TLSO brace when out of bed  F/u with Dr. Hopkins     Dyslipidemia  TIA  CAD   Continue aspirin   Continue atorvastatin 80 mg daily     Tachycardia   Episodes of SVT while at the hospital   Evaluated by Cardiology   Likely worsened by underlying hypoxia   Supplemental O2   continue diltiazem 24 hour capsule 180 mg daily per cardiology recommendations     SHOLA   Continue CPAP nightly     GERD  Continue PPI daily     Left lateral foot wound   Right anterior ankle wound   Traumatic left dorsal foot wound   Left anterior foot venous ulcer   Skin tear right distal arm   Pressure injury nose  Wound followed and managed by consistent, contracted [facility] NP    Advance Care Planning, 06/05/2025  This was a voluntary visit and the option to decline counseling was given  Length of discussion:  16 minutes  Life limiting problem:  Respiratory failure, adenocarcinoma  Topics discussed:  Code status  Outcome of discussion:  Patient would like to remain a full code.  In the event that he is not able to make his own decisions, he would like to defer decision making to his daughter  Decision Maker:Jordin Allen     Psychiatric Assessment  Patient Health Questionnaire-9 (PHQ-9)    Date:  06/05/2025  Total Score: 15  Screening is Positive   Diagnosis and Treatment Plan:  Moderate MDD   Continue amitriptyline 25 mg nightly   Increasing mirtazapine to 15 mg nightly, which will hopefully help with his appetite as well       Diet:  Cardiac   GI Ppx:  PPI   DVT Ppx:  Enoxaparin     Lines:  PIV   Drains:  None   Airways:n/a   Wounds:  Multiple    Goals of Care:   Restorative,  Treat infection  Optimize nutrition  Wound healing  Muscle strengthening  Restoration of ADL's  Improve mobility    Anticipated Disposition:    Anticipated d/c 6/24 IPR vs home    Follow up appointments:   Future Appointments    Date Time Provider Department Center   6/24/2025  7:15 AM [facility] OIC-MRI2 [facility]   6/24/2025  9:30 AM Steven Campos MD [facility] NEUROSC [place]   6/24/2025  2:00 PM Bienvenido Henson MD [facility] RADONC3 [place]   7/1/2025 10:30 AM [facility] OIC-XRAY [facility]   7/1/2025 11:30 AM Bolivar Sr PA [facility] NEUROS8 [facility]   7/8/2025  9:15 AM MEEKS, VISUAL FIELDS [facility] OPHTHAL [facility]   7/8/2025 10:00 AM Cara Looney MD [facility] OPHTHAL [facility]   7/15/2025 10:00 AM Garrett Lloyd DPM [facility] POD [facility] [place]   7/23/2025 10:15 AM CV [facility] ECHO [facility]ARDIA [place]   8/15/2025  9:20 AM Bienvenido Ward MD [facility] PULMON [place]   8/27/2025  9:00 AM Guido Trejo MD [facility] ENT [facility]   9/8/2025 11:10 AM Yan Palmer MD [facility] DERM [facility]         Ernestine Palomino, NP  Hospital Medicine [facility]    I have spent 58 minutes reviewing patient records, examining, and  of the patient/ patient's family with greater than 50% of the time spent with direct patient care and coordination.

## 2025-06-13 NOTE — PT/OT/SLP PROGRESS
"Occupational Therapy   Treatment    Name: Jordin Allen Jr.  MRN: [id number]  Admitting Diagnosis:  Acute on chronic respiratory failure with hypoxia       Recommendations:     Discharge Recommendations: Moderate Intensity Therapy  Discharge Equipment Recommendations:  other (see comments) (TBD)  Barriers to discharge:  Decreased caregiver support, Other (Comment) (increased assistance needed)    Assessment:     Jordin Allen Jr. is a 79 y.o. male with a medical diagnosis of Acute on chronic respiratory failure with hypoxia.  He presents with performance deficits affecting function are weakness, impaired endurance, gait instability, impaired functional mobility, decreased lower extremity function, decreased upper extremity function.     Rehab Prognosis:  Good; patient would benefit from acute skilled OT services to address these deficits and reach maximum level of function.       Plan:     Patient to be seen 5 x/week to address the above listed problems via self-care/home management, therapeutic activities, therapeutic exercises  Plan of Care Expires: 07/10/25  Plan of Care Reviewed with: patient    Subjective     Chief Complaint: "all these wires I can't stretch out."  Patient/Family Comments/goals: to go home  Pain/Comfort:  Pain Rating 1: 0/10    Objective:     Communicated with: Nurse prior to session.  Patient found up in chair with telemetry, blood pressure cuff, pulse ox (continuous), peripheral IV upon OT entry to room.    General Precautions: Standard, fall, aspiration    Orthopedic Precautions:spinal precautions  Braces: TLSO  Respiratory Status: Nasal cannula, flow 2 L/min     Occupational Performance:     Bed Mobility:    Patient up in chair        Activities of Daily Living:  Grooming: stand by assistance patient performed brushing teeth at bedside chair level with set up. Patient performed facial hygiene post grooming task.       [facility] 6 Click ADL:      Treatment & Education:  Patient performed BUE " strengthening using yellow theraband in all available planes while sitting in chair X15 reps with rest breaks in between to improve UE strength and endurance for ADL performance and functional mobility.  Patient performed ADLs as stated above.  Repositioned patient's TLSO to proper position prior to treatment.     Patient left up in chair with all lines intact and call button in reach    GOALS:   Multidisciplinary Problems       Occupational Therapy Goals          Problem: Occupational Therapy    Goal Priority Disciplines Outcome Interventions   Occupational Therapy Goal     OT, PT/OT Progressing    Description: Goals to be met by: 7//10/2025     Patient will increase functional independence with ADLs by performing:    UE Dressing with Set-up Assistance.  LE Dressing with SBA using appropriate AE as needed.  Grooming while seated at sink with Set-up Assistance.  Toileting from 3-in-1 commode over toilet with Stand-by Assistance for hygiene and clothing management.   Supine to sit with Mod I.   Step transfer with Stand-by Assistance with RW.  Toilet transfer to 3-in-1 commode over toilet with Stand-by Assistance with RW.                         DME Justifications:  TBD    Time Tracking:     OT Date of Treatment: 06/13/25  OT Start Time: 1038  OT Stop Time: 1105  OT Total Time (min): 27 min    Billable Minutes:Self Care/Home Management 15 min  Therapeutic Exercise 12 min    OT/JOSSUE: JOSSUE     Number of JOSSUE visits since last OT visit: 1 6/13/2025

## 2025-06-13 NOTE — PROGRESS NOTES
[facility]  Wound Care Progress Note  Attending Physician: Bernardino Guthrie MD  Primary Care Physician: Sierra Landry MD  Date of Admit: 6/3/2025      Chief Complaint    Wound to left leg   History of Present Illness:     Per attending   HPI: Jordin Allen Jr. Is a 79 y.o.M with pmhx of COPD on chronic 4L NC, GERD, HTN, SHOLA, CAD, NAFLD, dyslipidemia, L lung cancer s/p chemo and radiation c/b radiation necrosis, off immunotherapy since 12/24 metastasis to brain s/p XRT, anxiety, who presents to [facility] ED from LTAC with acute onset need for increased oxygen associated with chest pain beginning last night. Patient has not felt this chest pain since the episode. Currently no chest pain. Does endorse SOB and cough. Reports he is not coughing up anything because he feels like it is stuck in his chest. Denies fever, chills, chest pain, palpitations,  abdominal pain, n/v/d, dysuria, headaches, or any other symptoms at this time.      In ED: AF, VSS on 6-8L HFNC. CBC with leukocytosis. Hgb 11.9. CMP notable for Na 135, mild elevation in ALT. Phos 2.4. BNP 62. HS trop 12. LA 0.9. covid/flu negative. ABG with pH 7.499, pCO2 56.3, pO2 79, HCO3 43.8. Blood cultures pending. CTA chest with no evidence of acute PE, mild ill-defined airspace opacities in the dependent portions of the right upper and middle lobes when compared to 05/22/2025, nonspecific.  This could be due to atelectasis, aspiration and/or pneumonia 5 mm left upper lobe nodule, unchanged from 05/22/2025 but new when compared to 03/05/2025.  A follow-up chest CT in 1 year could be considered if the patient is at increased risk of developing lung cancer, such as from a smoking history. S/p duoneb, dexamethasone 10mg inj, vanc and zosyn in ED. Admitted to  for further evaluation.     6/4/2025 Patient seen for wound to left leg  Patient seen lying in bed. No acute distress. Wound care done with nurse. No issues or complaints at  time. Cont POC Plan to f/u on 6/5 6/6/2025 Patient seen lying in bed. No acute distress. Wound care done with nurse. No issues or complaints at time. Cont POC Plan to f/u on 6/9 6/12/2025 Patient seen lying in bed. Patient returned from acute stay. No acute distress. Wound care done with nurse. No issues or complaints at time. Cont POC Plan to f/u on 6/13      Past medical history:     Past Medical History:   Diagnosis Date    Benign neoplasm of colon 10/01/2013    Bronchitis chronic     CAD (coronary artery disease) 10/31/2013    Cataract 2008    COPD (chronic obstructive pulmonary disease)     Emphysema of lung     Encounter for preventive health examination 03/21/2018    GERD (gastroesophageal reflux disease)     Glaucoma 2010    Hearing loss 2015    Heart attack 2013    Hx of colonic polyps     Hypertension     NAFLD (nonalcoholic fatty liver disease) 03/27/2017    SHOLA on CPAP     RLS (restless legs syndrome)     Screen for colon cancer 08/30/2013     Past medical history was reviewed and was otherwise negative except as above.    Past surgical history:     Past Surgical History:   Procedure Laterality Date    bilateral foot surgery  1993    CARDIAC CATHETERIZATION  2013    x1    CATARACT EXTRACTION, BILATERAL      COLON SURGERY  2013    Ace Mott MD    COLONOSCOPY N/A 03/21/2018    Procedure: COLONOSCOPY;  Surgeon: Terry Mott MD;  Location: [address]);  Service: Endoscopy;  Laterality: N/A;    COLONOSCOPY N/A 04/12/2019    Procedure: COLONOSCOPY;  Surgeon: John Mantilla MD;  Location: [address]);  Service: Endoscopy;  Laterality: N/A;    COLONOSCOPY N/A 05/31/2022    Procedure: COLONOSCOPY;  Surgeon: MEDINA Farris MD;  Location: [address]);  Service: Endoscopy;  Laterality: N/A;  fully vacc-inst portal-tb    ENDOBRONCHIAL ULTRASOUND Bilateral 04/30/2024    Procedure: ENDOBRONCHIAL ULTRASOUND (EBUS);  Surgeon: Naveed Aguero MD;  Location: [facility];  Service:  Pulmonary;  Laterality: Bilateral;    EYE SURGERY  2011    scrotal abscess removal      TYMPANOSTOMY TUBE PLACEMENT  2017     Past surgical history was reviewed and was noncontributory except as above.    Allergies:     Review of patient's allergies indicates:   Allergen Reactions    Brilinta [ticagrelor] Itching    Lisinopril      Other reaction(s): cough    Metoprolol succinate Rash     Allergies were reviewed and were negative except as above.    Home Medications:     Prior to Admission medications    Medication Sig Start Date End Date Taking? Authorizing Provider   acetaminophen (TYLENOL) 500 MG tablet Take 500 mg by mouth 2 (two) times daily as needed for Pain.    Provider, Historical   albuterol (ACCUNEB) 0.63 mg/3 mL Nebu Inhale 3 mLs (0.63 mg total) by nebulization every 6 (six) hours as needed (if symptoms failed to improve with inhaler). Rescue 12/10/24   Bienvenido Ward MD   albuterol (PROVENTIL/VENTOLIN HFA) 90 mcg/actuation inhaler Inhale 2 puffs into the lungs every 4 (four) hours as needed for Wheezing. 12/10/24   Bienvenido Ward MD   amitriptyline (ELAVIL) 25 MG tablet Take 1 tablet (25 mg total) by mouth once daily.  Patient taking differently: Take 25 mg by mouth every evening. 9/3/24   Sierra Landry MD   aspirin (ECOTRIN) 81 MG EC tablet Take 81 mg by mouth every morning.    Provider, Historical   atorvastatin (LIPITOR) 80 MG tablet Take 1 tablet (80 mg total) by mouth in the morning. 4/21/25   Sierra Landry MD   BETA-CAROTENE,A, W-C & E/MIN (OCUVITE ORAL) Take 1 capsule by mouth once daily.     Provider, Historical   budesonide-glycopyr-formoterol (BREZTRI AEROSPHERE) 160-9-4.8 mcg/actuation HFAA Inhale 2 puffs into the lungs 2 (two) times a day. 12/10/24   Bienvenido Ward MD   dexAMETHasone (DECADRON) 4 MG Tab Take 1 tablet (4 mg total) by mouth 2 (two) times daily.  Patient not taking: Reported on 5/16/2025 3/26/25   Bienvenido Henson MD   echinacea 400 mg Cap  Take 1 capsule by mouth once daily.     Provider, Historical   fluticasone propionate (FLONASE) 50 mcg/actuation nasal spray Spray 2 sprays (100 mcg total) in Each Nostril once daily. 10/23/24   Caren Shah MD   furosemide (LASIX) 20 MG tablet Take 1 tablet (20 mg total) by mouth once daily. 4/15/25 4/15/26  Bienvenido Ward MD   ibuprofen (ADVIL,MOTRIN) 200 MG tablet Take 200 mg by mouth every 8 (eight) hours as needed for Pain. 2/21/25   Provider, Historical   latanoprost 0.005 % ophthalmic solution Instill 1 drop into both eyes every night at bedtime 1/30/25      levETIRAcetam (KEPPRA) 500 MG Tab Take 1 tablet (500 mg total) by mouth 2 (two) times daily. 3/24/25 3/24/26  Janene Nelson PA-C   losartan (COZAAR) 100 MG tablet Take 1 tablet (100 mg total) by mouth once daily. 12/19/24   Prince Ba MD   nitroGLYCERIN (NITROSTAT) 0.4 MG SL tablet Place 1 tablet (0.4 mg total) under the tongue every 5 (five) minutes as needed. 5/6/24   Prince Ba MD   omeprazole (PRILOSEC) 20 MG capsule Take 20 mg by mouth once daily.    Provider, Historical   polyethylene glycol (GLYCOLAX) 17 gram/dose powder Take 17 grams by mouth every day for constipation prevention 5/20/25      roflumilast (DALIRESP) 500 mcg Tab Take 500 mcg by mouth every morning. 2/22/25   Provider, Historical   sennosides (SENNA ORAL) Take 1 tablet by mouth daily as needed (Constipation).    Provider, Historical   traZODone (DESYREL) 50 MG tablet Take 1 tablet (50 mg total) by mouth every evening. 1/14/25   Lou Muro NP       Family History:     Family History   Problem Relation Name Age of Onset    Heart disease Mother jc deutsch     Heart attack Mother jc deutsch     Hypertension Mother jc deutsch     No Known Problems Sister      No Known Problems Daughter 2     Diabetes Maternal Grandmother imtiaz jennings     Asthma Neg Hx      Emphysema Neg Hx      Prostate cancer Neg Hx      Cirrhosis Neg Hx       Family history was  "reviewed and was otherwise negative except as above.    Social History:   Social History[1]  Social history was reviewed and was otherwise negative except as above    Review of Systems   A 10 point review of systems was conducted and was negative except as described in the HPI.  Patient denies Chest Pain.  Patient denies shortness of breath.  Patient denies fevers.  Patient denies chills.    Physical Examination:   Triage: BP: 123/77  Pulse: 101  Temp: 97.6 °F (36.4 °C)  Resp: 18  Height: 5' 9" (175.3 cm)  Weight: 86.8 kg (191 lb 5.8 oz) (witness by SALO Escalante and QASIM RTGreg)  BMI (Calculated): 28.2  SpO2: 97 %  Exam: /74 (BP Location: Right arm, Patient Position: Sitting)   Pulse 79   Temp 97.4 °F (36.3 °C) (Oral)   Resp 18   Ht 5' 9.02" (1.753 m)   Wt 88 kg (194 lb 0.1 oz)   SpO2 97%   BMI 28.64 kg/m²     Vitals  BP  Min: 113/62  Max: 139/74  Temp  Av °F (36.7 °C)  Min: 97.4 °F (36.3 °C)  Max: 98.3 °F (36.8 °C)  Pulse  Av.1  Min: 66  Max: 95  Resp  Av.7  Min: 14  Max: 22  SpO2  Av.6 %  Min: 93 %  Max: 99 %  Height  Av' 9.02" (175.3 cm)  Min: 5' 9.02" (175.3 cm)  Max: 5' 9.02" (175.3 cm)  Weight  Av kg (194 lb 0.1 oz)  Min: 88 kg (194 lb 0.1 oz)  Max: 88 kg (194 lb 0.1 oz)    General Pt alert and oriented, not in apparent distress, good nutrition  Eyes: No conjunctival erythema; normal appearing lids  HEENT: Normocephalic atraumatic, mucous membranes moist  Cardiovascular: No edema  Respiratory: Non-labored, no accessory muscle use, no wheezing  Abdomen: Soft, non tender, non distended, no rebound  Musculoskeletal: no clubbing or cyanosis of digits  Neuro: Grossly intact, weakness upper/lower extremities  Psych: Good mood, full affect, good insight  Skin:    Wound 25 Moisture associated dermatitis Left dorsal Foot   Date First Assessed/Time First Assessed: 25   Present on Original Admission: Yes  Primary Wound Type: Moisture associated dermatitis  " Side: Left  Orientation: dorsal  Location: Foot  Is this injury device related?: No   Wound Image                            Dressing Appearance Dry   Drainage Amount None   Drainage Characteristics/Odor No odor   Appearance Dry   Periwound Area Intact   Care Wound cleanser   Dressing Applied  (Lac-hydrin 12%)        Wound 06/04/25 1030 Moisture associated dermatitis Right lateral Abdomen   Date First Assessed/Time First Assessed: 06/04/25 1030   Present on Original Admission: Yes  Primary Wound Type: Moisture associated dermatitis  Side: Right  Orientation: lateral  Location: Abdomen   Wound Image    Dressing Appearance Open to air   Drainage Amount None   Red (%), Wound Tissue Color 100 %   Periwound Area Moist   Care Wound cleanser   Dressing    (applied Triad paste, open to air)        Wound 06/04/25 1030 Moisture associated dermatitis Right Groin   Date First Assessed/Time First Assessed: 06/04/25 1030   Present on Original Admission: Yes  Primary Wound Type: Moisture associated dermatitis  Side: Right  Location: Groin  Is this injury device related?: No   Wound Image    Dressing Appearance Open to air;No dressing   Drainage Amount None   Red (%), Wound Tissue Color 100 %   Periwound Area Intact   Care Cleansed with:;Wound cleanser   Dressing    (applied Triad paste, open to air)        Wound 06/04/25 1030 Moisture associated dermatitis Left Groin   Date First Assessed/Time First Assessed: 06/04/25 1030   Present on Original Admission: Yes  Primary Wound Type: Moisture associated dermatitis  Side: Left  Location: Groin   Wound Image    Dressing Appearance Open to air   Drainage Amount None   Appearance Pink   Red (%), Wound Tissue Color 100 %   Periwound Area Moist   Care Cleansed with:;Wound cleanser   Dressing    (applied Triad paste, open to air)        Wound 06/04/25 1030 Moisture associated dermatitis Sacral spine   Date First Assessed/Time First Assessed: 06/04/25 1030   Present on Original Admission: Yes   Primary Wound Type: (c) Moisture associated dermatitis  Location: (c) Sacral spine   Wound Image                            Dressing Appearance Open to air   Drainage Amount None   Drainage Characteristics/Odor No odor   Red (%), Wound Tissue Color 100 %   Periwound Area Moist   Care Wound cleanser   Dressing Applied  (applied Triad paste, open to air)        Wound 06/09/25 Pressure Injury Nose   Date First Assessed: 06/09/25   Present on Original Admission: Yes  Primary Wound Type: Pressure Injury  Location: Nose   Wound Image    Dressing Appearance Open to air;Dry   Drainage Amount None   Appearance Pink;Maroon   Red (%), Wound Tissue Color 100 %   Periwound Area Intact;Dry   Wound Length (cm) 0 cm   Wound Width (cm) 0 cm   Wound Depth (cm) 0 cm   Wound Volume (cm^3) 0 cm^3   Wound Surface Area (cm^2) 0 cm^2   Care Cleansed with:;Wound cleanser   Skin Interventions   Device Skin Pressure Protection pressure points protected;skin-to-device areas padded;tubing/devices free from skin contact   Pressure Reduction Devices pressure-redistributing mattress utilized;specialty bed utilized   Skin Protection hydrocolloids used       Laboratory:  Most Recent Data:  CBC:   Lab Results   Component Value Date    WBC 11.92 06/13/2025    HGB 10.0 (L) 06/13/2025    HCT 31.1 (L) 06/13/2025     (L) 06/13/2025    MCV 88 06/13/2025    RDW 15.7 (H) 06/13/2025     BMP:   Lab Results   Component Value Date     06/13/2025    K 4.0 06/13/2025     06/13/2025    CO2 31 (H) 06/13/2025    BUN 37 (H) 06/13/2025    CREATININE 1.1 06/13/2025     (H) 06/13/2025    CALCIUM 8.6 (L) 06/13/2025    MG 2.1 06/13/2025    PHOS 2.1 (L) 06/13/2025     LFTs:   Lab Results   Component Value Date    PROT 5.0 (L) 06/13/2025    ALBUMIN 2.0 (L) 06/13/2025    BILITOT 0.3 06/13/2025    AST 43 06/13/2025    ALKPHOS 130 06/13/2025     (H) 06/13/2025     Coags:   Lab Results   Component Value Date    INR 1.0 02/07/2025     FLP:   Lab  "Results   Component Value Date    CHOL 101 (L) 02/07/2025    HDL 35 (L) 02/07/2025    LDLCALC 53.2 (L) 02/07/2025    TRIG 64 02/07/2025    CHOLHDL 34.7 02/07/2025     DM:   Lab Results   Component Value Date    HGBA1C 5.7 (H) 03/06/2024    HGBA1C 5.6 10/12/2023    HGBA1C 5.8 05/30/2013    GLUF 103 05/19/2014    LDLCALC 53.2 (L) 02/07/2025    CREATININE 1.1 06/13/2025     Thyroid:   Lab Results   Component Value Date    TSH 0.261 (L) 06/01/2025    FREET4 0.99 06/01/2025    O8SKLTV 9.1 03/06/2024    R5HYPAR 86 03/07/2017     Anemia:   Lab Results   Component Value Date    IRON 28 (L) 08/29/2024    TIBC 266 08/29/2024    FERRITIN 2,080 (H) 08/29/2024    [id number] 474 08/29/2024    FOLATE 4.5 08/29/2024     Cardiac:   Lab Results   Component Value Date    TROPONINI 10.800 (H) 06/08/2025    BNP 62 06/09/2025     Urinalysis:   Lab Results   Component Value Date    COLORU Yellow 06/09/2025    SPECGRAV 1.010 06/09/2025    NITRITE Negative 06/09/2025    KETONESU Trace (A) 02/07/2025    UROBILINOGEN Negative 06/09/2025    WBCUA 4 06/09/2025       Trended Lab Data:  Recent Labs   Lab 06/10/25  0330 06/11/25  0441 06/11/25  0442 06/13/25  0413   WBC 12.20 11.82  --  11.92   HGB 11.6* 10.9*  --  10.0*   * 143*  --  138*   MCV 90 88  --  88   RDW 15.9* 16.0*  --  15.7*   *  --  135* 143   K 4.0  --  3.1* 4.0   CL 92*  --  94* 102   CO2 29  --  30* 31*   BUN 34*  --  34* 37*   *  --  162* 167*   CALCIUM 9.0  --  8.3* 8.6*   PROT 6.2  --  5.1* 5.0*   ALBUMIN 2.2*  --  1.9* 2.0*   BILITOT 0.6  --  0.5 0.3   *  --  121* 43   ALKPHOS 143  --  116 130   *  --  157* 111*       Trended Cardiac Data:  Recent Labs   Lab 06/08/25  1937 06/09/25  0905   TROPONINI 10.800*  --    BNP  --  62       Micro Results  No components found for: "CBLOOD"  No components found for: "CURINE"  No components found for: "CRESPWSM"    Radiology:  Echo  Result Date: 6/11/2025    Left Ventricle: The left ventricle is normal in " size. Ventricular mass is normal. Normal wall thickness. There is low normal systolic function with a visually estimated ejection fraction of 50 - 55%.   Inferolateral pseudodyskinesis   Right Ventricle: Right ventricle was not well visualized due to poor acoustic window. The right ventricle is normal in size Systolic function is mildly reduced.   IVC/SVC: Intermediate venous pressure at 8 mmHg.   RV function not well seen.     CTA Chest Non-Coronary (PE Studies)  Result Date: 6/10/2025  EXAMINATION: CTA CHEST NON CORONARY (PE STUDIES) CLINICAL HISTORY: r/o PE, signifcant tachycardia; TECHNIQUE: Low dose axial images, sagittal and coronal reformations were obtained from the thoracic inlet to the lung bases following the IV administration of 100 mL of Omnipaque 350.  Contrast timing was optimized to evaluate the pulmonary arteries.  MIP images were performed. COMPARISON: None FINDINGS: Pulmonary arteries:Pulmonary arteries are adequately enhanced.No filling defects to indicate pulmonary embolism. Thoracic soft tissues: Unremarkable. Aorta: Mild aneurysmal dilation of the ascending thoracic aorta to approximately 4.2 cm axial 256 of series 2. Heart: Normal size. No effusion. Nathalie/Mediastinum: No pathologic carolyn enlargement. Airways: Patent. Lungs/Pleura: Prominent emphysematous changes throughout the lungs. Mild interstitial infiltrate or scarring posterior right upper lobe. Mild left basilar atelectasis. Minimal left upper lobe ground-glass infiltrate. Esophagus: Unremarkable. Upper Abdomen: No abnormality of the partially imaged upper abdomen. Bones: Moderate chronic compression fracture superior endplate of L1. Aberrant right subclavian artery noted as an anatomic variant.     1. No evidence of pulmonary embolism. 2. Mild aneurysm dilation of the ascending thoracic aorta to approximately 4.2 cm.  Recommend surveillance. 3. Emphysematous changes of the lungs. 4. Mild interstitial infiltrate or scarring in the  posterior right upper lobe and minimal left upper lobe infiltrate.  Minimal pneumonitis is not excluded. 5. Aberrant right subclavian artery noted as an anatomic variant. 6. Moderate chronic compression fracture of the superior endplate of L1 Electronically signed by: Ryan Jackson Date:    06/10/2025 Time:    21:38    CTA Chest Non-Coronary (PE Studies)  Result Date: 6/9/2025  EXAMINATION: CTA CHEST NON CORONARY (PE STUDIES) CLINICAL HISTORY: Pulmonary embolism (PE) suspected, high prob; TECHNIQUE: During intravenous bolus injection of 100 ml of Omnipaque 350 contrast medium and using low dose technique, the chest was surveyed from above the pulmonary apices through the posterior costophrenic angles data was reconstructed for multiplanar images in axial, sagittal and coronal planes and for maximal intensity projection images in the the axial, sagittal and coronal planes. COMPARISON: Multiple priors, most recent CTA 05/22/2025 FINDINGS: Exam quality: Satisfactory. Pulmonary embolism: No acute or chronic pulmonary embolism. Central pulmonary arteries: Normal caliber. Aorta: Normal caliber.  Mild atherosclerosis.  Aberrant right subclavian artery. Heart: No right heart strain. Normal size. Coronary arteries: Multi-vessel calcific atherosclerosis of the coronary arteries. Pericardium: Normal. No effusion, thickening, or calcification. Support tubes and lines: None. Base of neck/thyroid: Normal. Lymph nodes: No supraclavicular, axillary, internal mammary, mediastinal, or hilar adenopathy. Esophagus: Normal. Pleura: No effusion, thickening, or calcification. Upper abdomen: Accessory splenule.  Fatty atrophy of the pancreas. Body wall: Unremarkable Airways: Normal. Lungs: Centrilobular and paraseptal emphysema.  Treatment changes of the left hilar region with associated volume loss and architectural distortion, stable when compared to prior imaging.  Right bandlike scarring versus atelectasis.  Left lower lobe calcified  granuloma.  Ill-defined airspace opacities present in the dependent portions of the right upper and middle lobes are new when compared to 05/22/2025.  A 4 mm nodule in the left upper lobe (series 3, image 257) is unchanged from 05/22/2025 but new when compared to 03/05/2025. Bones: Degenerative changes of the spine.  Height loss of the superioror endplate of L1, unchanged from 02/20/2025.  Unchanged sclerotic focus in the posterior elements of T9.     No evidence of acute pulmonary embolism. New mild ill-defined airspace opacities in the dependent portions of the right upper and middle lobes when compared to 05/22/2025, nonspecific.  This could be due to atelectasis, aspiration and/or pneumonia. 5 mm left upper lobe nodule, unchanged from 05/22/2025 but new when compared to 03/05/2025.  A follow-up chest CT in 1 year could be considered if the patient is at increased risk of developing lung cancer, such as from a smoking history. Additional findings as above. Electronically signed by resident: Madeline Iniguez Date:    06/09/2025 Time:    10:55 Electronically signed by: Anna Brown Date:    06/09/2025 Time:    11:55    X-Ray Chest AP Portable  Result Date: 6/9/2025  EXAMINATION: XR CHEST AP PORTABLE CLINICAL HISTORY: sob; TECHNIQUE: Single frontal view of the chest was performed. COMPARISON: 06/01/25 FINDINGS: Cardiac size is normal.  Lungs are clear with no significant infiltrate or vascular congestion.  No pleural fluid is seen.     See above Electronically signed by: Prince Ovalle MD Date:    06/09/2025 Time:    09:29    X-Ray Chest AP Portable  Result Date: 6/1/2025  EXAMINATION: XR CHEST AP PORTABLE CLINICAL HISTORY: palpitations; TECHNIQUE: Single frontal view of the chest was performed. COMPARISON: 05/26/2025. FINDINGS: The lungs are well expanded and clear. No focal opacities are seen. The pleural spaces are clear. The cardiac silhouette is unremarkable. The visualized osseous structures are  unremarkable.     No acute abnormality. Electronically signed by: Popeye Mckeon Date:    06/01/2025 Time:    22:33    X-Ray Chest AP Portable  Result Date: 5/26/2025  EXAMINATION: XR CHEST AP PORTABLE CLINICAL HISTORY: sob; FINDINGS: Chest one view AP portable. Heart size is normal.  Lungs are clear.  The bones are noncontributory.     No acute process seen. Electronically signed by: Alexander Blankenship MD Date:    05/26/2025 Time:    09:16    X-Ray Chest AP Portable  Result Date: 5/25/2025  EXAMINATION: XR CHEST AP PORTABLE CLINICAL HISTORY: chf; TECHNIQUE: Single frontal view of the chest was performed. COMPARISON: 05/22/2025 FINDINGS: The cardiomediastinal silhouette is stable in configuration noting calcification of the aorta..  There is no pleural effusion.  The trachea is midline.  The lungs are symmetrically expanded bilaterally with coarse interstitial attenuation, similar to the previous examination.  There is increased parenchymal attenuation projected over the medial aspect of the right lower lung zone, may reflect atelectasis, developing consolidation not excluded..  There is no pneumothorax.  The osseous structures are unchanged..     As above Electronically signed by: Artur Velazquez MD Date:    05/25/2025 Time:    19:17    Echo  Result Date: 5/23/2025    Tachycardia was present during the study   Erall the study quality was technically difficult. valves not well visualized.   Left Ventricle: The left ventricle is normal in size. Normal wall thickness. Normal wall motion. There is normal systolic function with a visually estimated ejection fraction of 65 - 70%. There is normal diastolic function. Normal left ventricular filling pressure.   Right Ventricle: The right ventricle is normal in size Wall thickness is normal. Systolic function is normal.   Left Atrium: Left atrium was not well visualized.   Right Atrium: Not well visualized due to poor acoustic window.   Aortic Valve: Not well visualized due to  poor acoustic window.   Mitral Valve: Not well visualized due to poor acoustic window.   Tricuspid Valve: Not well visualized due to poor acoustic window.   Pulmonic Valve: Not well visualized due to poor acoustic window.   Aorta: The aortic root is normal in size measuring 2.79 cm. The ascending aorta is normal in size measuring 2.7 cm.   IVC/SVC: Normal venous pressure at 3 mmHg.   Pericardium: There is no pericardial effusion.     US Lower Extremity Veins Bilateral  Result Date: 5/22/2025  EXAMINATION: US LOWER EXTREMITY VEINS BILATERAL CLINICAL HISTORY: dvt; TECHNIQUE: Duplex and color flow Doppler and dynamic compression was performed of the bilateral lower extremity veins was performed. COMPARISON: 05/15/2025. FINDINGS: Right thigh veins: The common femoral, femoral, popliteal, upper greater saphenous, and deep femoral veins are patent and free of thrombus. The veins are normally compressible and have normal phasic flow and augmentation response. Right calf veins: The visualized calf veins are patent. Left thigh veins: The common femoral, femoral, popliteal, upper greater saphenous, and deep femoral veins are patent and free of thrombus. The veins are normally compressible and have normal phasic flow and augmentation response. Left calf veins: The visualized calf veins are patent. Miscellaneous: None     No evidence of deep venous thrombosis in either lower extremity. Electronically signed by: Vidal Arroyo MD Date:    05/22/2025 Time:    18:06    CTA Chest Non-Coronary (PE Studies)  Result Date: 5/22/2025  EXAMINATION: CTA CHEST NON CORONARY (PE STUDIES) CLINICAL HISTORY: Pulmonary embolism (PE) suspected, unknown D-dimer; TECHNIQUE: Low dose axial images, sagittal and coronal reformations were obtained from the thoracic inlet to the lung bases following the IV administration of 100 mL of Omnipaque 350.  Contrast timing was optimized to evaluate the pulmonary arteries.  MIP images were performed. COMPARISON:  Radiograph 05/22/2025, CT abdomen and pelvis 05/19/2025.  CT chest 03/05/2025, CTA chest 03/19/2024, CTA chest 12/28/2024 FINDINGS: The structures at the base of the neck are unremarkable.  No significant mediastinal lymphadenopathy.  The heart is not enlarged.  The thoracic aorta tapers normally noting aberrant right subclavian.  There is atherosclerotic calcification of the aorta and in the distribution of the coronary arteries.  The visualized portions of the spleen and adrenal glands are unremarkable.  There is fatty atrophy of the pancreas.  The gallbladder is distended without wall thickening.  The liver is unremarkable.  There is bilateral perinephric fat stranding, correlation with urinalysis recommended. Motion artifact limits evaluation of the airways and pulmonary parenchyma.  There are aerated secretions layering posteriorly within the upper trachea, placing patient at risk for aspiration.  The airways are otherwise patent.  There is bilateral pulmonary emphysematous change.  There is scattered interlobular septal thickening.  There is a nodular focus within the anterior aspect of the left upper lobe measuring in conglomerate 5-6 mm not seen on the previous exam.  There is bilateral basilar dependent atelectasis.  No large focal consolidation.  No pneumothorax. There are post treatment changes within the left hilar region, less conspicuous on current exam. Bolus timing is adequate for evaluation of pulmonary thromboembolism.  Allowing for artifact from motion, no convincing pulmonary arterial filling defects to the level of the segmental branches bilaterally to suggest pulmonary thromboembolism. There is osteopenia.  There are degenerative changes of the spine.  No significant axillary lymphadenopathy.  There is height loss involving the superior endplate of L1 new since examination 03/05/2025, correlation with any focal tenderness recommended.  There is a sclerotic focus within the posterior elements of  T9 stable.     1. Allowing for motion artifact, no convincing pulmonary thromboembolism.  Correlation of these findings with D-dimer and/or lower extremity ultrasound as warranted. 2. Pulmonary emphysematous change noting superimposed mild edema.  There is a nodular focus within the anterior aspect of the left upper lobe, not convincingly seen on the previous examination.  Developing infectious or inflammatory process is a consideration, continued follow-up is recommended to exclude discrete nodule in the setting of malignancy. 3. Aerated secretion within the proximal trachea, places patient at risk for aspiration. 4. Endplate compression deformity of L1, new since examination 03/05/2025 however stable from examination 05/19/2025. 5. Please see above for several additional findings. Electronically signed by: Artur Velazquez MD Date:    05/22/2025 Time:    17:21    X-Ray Foot Complete Left  Result Date: 5/22/2025  EXAMINATION: XR FOOT COMPLETE 3 VIEW LEFT CLINICAL HISTORY: .  Cellulitis, unspecified TECHNIQUE: AP, lateral and oblique views of the left foot were performed. COMPARISON: None FINDINGS: Three views left foot. There is osteopenia.  There are degenerative changes of the foot.  There is remote fracture of the fifth metatarsal.  Bandaging material overlies the lateral aspect of the foot.  No radiopaque foreign body.  There are degenerative changes of the calcaneus.  There is edema primarily about the dorsal aspect of the foot.     1. No convincing acute displaced fracture or dislocation of the foot noting diffuse edema particularly along the dorsal aspect.  Correlation is advised. Electronically signed by: Artur Velazquez MD Date:    05/22/2025 Time:    14:46    X-Ray Chest AP Portable  Result Date: 5/22/2025  EXAMINATION: XR CHEST AP PORTABLE CLINICAL HISTORY: CHF; TECHNIQUE: Single frontal view of the chest was performed. COMPARISON: 02/07/2025 FINDINGS: The cardiomediastinal silhouette is not enlarged  noting calcification of the aorta..  There is no pleural effusion.  The trachea is midline.  The lungs are symmetrically expanded bilaterally with coarse interstitial attenuation bilaterally noting scattered regions of bandlike atelectasis, similar to the previous examination.  No large focal consolidation seen.  There is no pneumothorax.  The osseous structures are remarkable for degenerative change..     1. Chronic appearing interstitial findings, superimposed edema remains a consideration.  No large focal consolidation. Electronically signed by: Artur Velazquez MD Date:    05/22/2025 Time:    13:15    CT Abdomen Pelvis With IV Contrast NO Oral Contrast  Result Date: 5/19/2025  EXAMINATION: CT ABDOMEN PELVIS WITH IV CONTRAST CLINICAL HISTORY: Epigastric pain;SBO rule out; TECHNIQUE: Low dose axial images, sagittal and coronal reformations were obtained from the lung bases to the pubic symphysis following the IV administration of 75 mL of Omnipaque 350 COMPARISON: CT of the chest, abdomen, and pelvis 03/05/2020 FINDINGS: Lower chest: Partially imaged cardiac valvular calcifications.  Atelectasis at the lung bases.  Patchy airspace consolidation in the medial basilar right lower lobe. Liver: Normal contour.  Subcentimeter hypodense lesion right hepatic lobe too small to definitely characterize. Gallbladder and bile ducts: Somewhat hydropic appearance of the gallbladder. No definite radiodense gallstones or pericholecystic inflammation.  No concerning biliary ductal dilatation. Pancreas: Unremarkable. Spleen: Normal contour.  At least 2 accessory splenule suggested. Adrenals: Unremarkable. Kidneys: Unchanged appearance of the kidneys compared to 03/05/2025 CT.  Unchanged presumed cysts in the left kidney.  Additional subcentimeter hypodense lesions are too small to definitely characterize.  Nonspecific bilateral perinephric stranding again seen. Lymph nodes: No abdominal or pelvic lymphadenopathy. Bowel and  mesentery: Postoperative changes of the bowel again noted in keeping with partial colectomy.  Colonic diverticulosis.  Relatively diffuse fluid distended colonic loops.  Short segment focal narrowing of the sigmoid colon (axial series 2, image 110).  Small bowel normal caliber.Appendix not seen and may be surgically absent. Abdominal aorta: Nonaneurysmal.  Moderate to heavy atherosclerosis. Inferior vena cava: Unremarkable. Free fluid or free air: None. Pelvis: Unremarkable. Body wall: Small fat containing umbilical and infraumbilical ventral hernias. Bones: No acute change.  Suspect osseous demineralization.  No change in configuration of recent burst type fracture of the L1 vertebra compared to dedicated CT of the lumbar spine performed 05/14/2025.     1. Relatively diffusely dilated fluid-filled colonic loops, nonspecific.  Findings could reflect a nonspecific diarrheal illness, possibly infectious or noninfectious inflammatory in nature.  Noting this, there is an apparent short segment focal narrowing of the sigmoid colon, which could reflect peristalsis, however stricture or neoplasm difficult to exclude in this region.  Attention on CT follow-up recommended.  Additionally, consider colonoscopy for further characterization, if not recently performed. 2. Patchy airspace consolidation in the basilar right lower lobe, possibly infectious or noninfectious inflammatory in etiology.  Correlation advised. 3. Additional details of chronic and incidental findings, as provided in the body of the report. Electronically signed by: Bolivar Gonsales Date:    05/19/2025 Time:    11:31    US Lower Extremity Veins Bilateral  Result Date: 5/15/2025  EXAMINATION: US LOWER EXTREMITY VEINS BILATERAL CLINICAL HISTORY: r/o DVT; TECHNIQUE: Duplex and color flow Doppler and dynamic compression was performed of the bilateral lower extremity veins was performed. COMPARISON: None FINDINGS: Right thigh veins: The common femoral, femoral,  popliteal, upper greater saphenous, and deep femoral veins are patent and free of thrombus. The veins are normally compressible and have normal phasic flow and augmentation response. Right calf veins: The visualized calf veins are patent. Left thigh veins: The common femoral, femoral, popliteal, upper greater saphenous, and deep femoral veins are patent and free of thrombus. The veins are normally compressible and have normal phasic flow and augmentation response. Left calf veins: The visualized calf veins are patent. Miscellaneous: None     No evidence of deep venous thrombosis in either lower extremity. Electronically signed by: Bolivar Resendiz MD Date:    05/15/2025 Time:    14:36    Echo  Result Date: 5/15/2025    Left Ventricle: The left ventricle is normal in size. Ventricular mass is normal. Normal wall thickness. Normal wall motion. There is low normal systolic function with a visually estimated ejection fraction of 50 - 55%. Ejection fraction is approximately 55%. There is normal diastolic function.   Right Ventricle: The right ventricle is normal in size Wall thickness is normal. Systolic function is normal.   IVC/SVC: Normal venous pressure at 3 mmHg.     MRI Lumbar Spine W WO Cont  Result Date: 5/14/2025  EXAMINATION: MRI LUMBAR SPINE W WO CONTRAST CLINICAL HISTORY: Compression fracture, lumbar; TECHNIQUE: Multiplanar, multisequence MR images were acquired from the thoracolumbar junction to the sacrum without and with the administration of contrast.  The patient received 10 cc of Gadavist IV. COMPARISON: CT L-spine same-date. FINDINGS: Lumbar spine alignment is within normal limits. Acute L1 superior endplate compression fracture, with mild anterior height loss.  2 mm osseous retropulsion.  No resultant spinal canal stenosis or neural foraminal narrowing. No marrow signal abnormality suspicious for an infiltrative process.  L2 and L5 vertebral body hemangiomas.  There is a some mild increased edema in  the L2 vertebral body with no fracture identified. The conus is normal in appearance, and terminates at the L2 level. Mild disc space narrowing at L4-L5.  Multilevel disc desiccation.There are findings of lumbar spondylosis, as below. T12-L1: No spinal canal stenosis or neural foraminal narrowing. L1-L2: No spinal canal stenosis or neural foraminal narrowing. L2-L3: Mild disc bulge.  Mild facet arthropathy and ligamentum flavum thickening.  Mild bilateral foraminal narrowing.  Mild lateral recess stenosis. L3-L4: Mild disc bulge.  Mild facet arthropathy and ligamentum flavum thickening.  Mild bilateral foraminal narrowing.  Mild lateral recess stenosis. L4-L5: Disc bulge with moderate facet arthropathy and ligamentum flavum thickening.  Mild bilateral foraminal narrowing.  Mild spinal canal stenosis. L5-S1: Advanced facet arthropathy.  No spinal canal stenosis or neural foraminal narrowing. The adjacent soft tissue structures demonstrate bilateral renal cysts.  Colonic diverticulosis. Following the administration of intravenous contrast, there is enhancement of the L1 endplate at the level of the fracture site. There is enhancement within the anterior prevertebral soft tissues extending from T12-L1. There is enhancement of the L2 intraosseous hemangioma.     1.  Subacute compression fracture of the L1 superior endplate.  Mild anterior height loss and 2 mm osseous retropulsion.  No resultant spinal canal stenosis or neural foraminal narrowing at that level. 2.  Nonspecific enhancement in the anterior prevertebral soft tissues from T12-L1.  This may be reactive in nature. 3.  Intraosseous hemangioma in the L2 vertebral body with associated minimal increased edema.  Some component of bony contusion at this level with present. 4.  Mild lumbar spondylosis as described above. Electronically signed by resident: Franki Funes Date:    05/14/2025 Time:    18:26 Electronically signed by: Vidal Arroyo MD Date:    05/14/2025  Time:    18:51      Recent Results (from the past 24 hours)   Magnesium    Collection Time: 06/13/25  4:13 AM   Result Value Ref Range    Magnesium  2.1 1.6 - 2.6 mg/dL   Comprehensive Metabolic Panel    Collection Time: 06/13/25  4:13 AM   Result Value Ref Range    Sodium 143 136 - 145 mmol/L    Potassium 4.0 3.5 - 5.1 mmol/L    Chloride 102 95 - 110 mmol/L    CO2 31 (H) 23 - 29 mmol/L    Glucose 167 (H) 70 - 110 mg/dL    BUN 37 (H) 8 - 23 mg/dL    Creatinine 1.1 0.5 - 1.4 mg/dL    Calcium 8.6 (L) 8.7 - 10.5 mg/dL    Protein Total 5.0 (L) 6.0 - 8.4 gm/dL    Albumin 2.0 (L) 3.5 - 5.2 g/dL    Bilirubin Total 0.3 0.1 - 1.0 mg/dL     40 - 150 unit/L    AST 43 11 - 45 unit/L     (H) 10 - 44 unit/L    Anion Gap 10 8 - 16 mmol/L    eGFR >60 >60 mL/min/1.73/m2   Phosphorus    Collection Time: 06/13/25  4:13 AM   Result Value Ref Range    Phosphorus Level 2.1 (L) 2.7 - 4.5 mg/dL   CBC with Differential    Collection Time: 06/13/25  4:13 AM   Result Value Ref Range    WBC 11.92 3.90 - 12.70 K/uL    RBC 3.54 (L) 4.60 - 6.20 M/uL    HGB 10.0 (L) 14.0 - 18.0 gm/dL    HCT 31.1 (L) 40.0 - 54.0 %    MCV 88 82 - 98 fL    MCH 28.2 27.0 - 31.0 pg    MCHC 32.2 32.0 - 36.0 g/dL    RDW 15.7 (H) 11.5 - 14.5 %    Platelet Count 138 (L) 150 - 450 K/uL    MPV 10.6 9.2 - 12.9 fL    Nucleated RBC 0 <=0 /100 WBC   Manual Differential    Collection Time: 06/13/25  4:13 AM   Result Value Ref Range    Gran # (ANC) 10.5 K/uL    Segmented Neutrophil % 88.0 (H) 38.0 - 73.0 %    Lymphocyte % 8.0 (L) 18.0 - 48.0 %    Monocyte % 2.0 (L) 4.0 - 15.0 %    Metamyelocyte % 1.0 %    Myelocyte % 1.0 %     A1C   Lab Results   Component Value Date    HGBA1C 5.7 (H) 03/06/2024         Assessment/Plan:     Cellulitis to Left lower leg -resolved  Dermatitis to left foot   -wash with bath wipes apply lac-hydrin daily per bedside nurse     MASD to Sacrum and Buttocks   Wound care clean with bath wipes apply [employer] daily  Turn patient every 2  hours    Rash to:  RIGHT & LEFT GROIN  RIGHT & LEFT ABDOMEN  -clean with bath wipes apply antifungal powder daily per bedside nurse     Pressure wound stage I to right top ankle -resolved  -leave JOSSUE monitor     Decreased Mobility   -Patient with decreased bed mobility therefore patient is at high risk for developing wounds and deterioration of any current wounds. Patient needs frequent turning.     Nutrition :  Promote good nutrition to for improved wound healing.      Off-loading:   Follow facility bed policy for proper bed surface   Offload heels per facility protocol   Turn every 2 hours   Use Wheelchair Cushion     Patient Treatment Goals:  Short Term Goals  The wound base will be 25% .,demonstrate 25% more granulation tissue.  Short Term Goals  Patient wound status will improve/maintain without exacerbation infection of deterioration.  Wound Healing  Patient will have a decrease in wound size by 20% in 4 weeks.  Clinical Goals  Prevention of Infection is important for wound healing  Functional Goals:  The patient will achieve proper turning schedule.    -cont medical management     High Risk Because  -Chronic illness with SEVERE exacerbation, progression, or side effects of treatment.  -Acute or chronic illness or injury that poses a threat to life or bodily function    Notify Sheree Tao NP, or wound care RN if any new issues arise or if there is deterioration in documented wounds.    Sheree Tao FNP-C             [1]   Social History  Tobacco Use    Smoking status: Former     Current packs/day: 0.00     Average packs/day: 1 pack/day for 40.0 years (40.0 ttl pk-yrs)     Types: Cigarettes     Start date: 8/15/1972     Quit date: 8/15/2012     Years since quittin.8     Passive exposure: Never    Smokeless tobacco: Never   Substance Use Topics    Alcohol use: Yes     Alcohol/week: 3.0 standard drinks of alcohol     Types: 3 Cans of beer per week     Comment: maybe 2-3  beers weekly    Drug  use: No

## 2025-06-13 NOTE — CARE UPDATE
06/13/25 1233   Patient Assessment/Suction   Level of Consciousness (AVPU) alert   Respiratory Effort Unlabored   Expansion/Accessory Muscles/Retractions no retractions;no use of accessory muscles   All Lung Fields Breath Sounds clear;diminished   Rhythm/Pattern, Respiratory depth regular;pattern regular   Cough Frequency infrequent   Cough Type good;loose;nonproductive   PRE-TX-O2   Device (Oxygen Therapy) nasal cannula with humidification   Flow (L/min) (Oxygen Therapy) 1.5  (DECREASED TO 1)   SpO2 97 %   Pulse Oximetry Type Continuous   Pulse 79   Resp 18   Aerosol Therapy   $ Aerosol Therapy Charges Aerosol Treatment   Respiratory Treatment Status (SVN) given   Treatment Route (SVN) mask;air   Patient Position sitting in chair   Post Treatment Assessment (SVN) breath sounds unchanged   Signs of Intolerance (SVN) none   Breath Sounds Post-Respiratory Treatment   Post-treatment Heart Rate (beats/min) 92   Post-treatment Resp Rate (breaths/min) 20   Vibratory PEP Therapy   $ Vibratory PEP Charges Acapella Therapy   $ Vibratory PEP Tech Time Charges 15 min   Type (PEP Therapy) vibratory/oscillatory   Route (PEP Therapy) mouthpiece   Breaths per Cycle (PEP Therapy) 10   Cycles (PEP Therapy) 1   Patient Position (PEP Therapy) sitting in chair   Post Treatment Assessment (PEP) breath sounds unchanged   Signs of Intolerance (PEP Therapy) none

## 2025-06-13 NOTE — PROGRESS NOTES
[facility]  Wound Care Progress Note  Attending Physician: Bernardino Guthrie MD  Primary Care Physician: Sierra Landry MD  Date of Admit: 6/3/2025      Chief Complaint    Wound to left leg   History of Present Illness:     Per attending   HPI: Jordin Allen Jr. Is a 79 y.o.M with pmhx of COPD on chronic 4L NC, GERD, HTN, SHOLA, CAD, NAFLD, dyslipidemia, L lung cancer s/p chemo and radiation c/b radiation necrosis, off immunotherapy since 12/24 metastasis to brain s/p XRT, anxiety, who presents to [facility] ED from LTAC with acute onset need for increased oxygen associated with chest pain beginning last night. Patient has not felt this chest pain since the episode. Currently no chest pain. Does endorse SOB and cough. Reports he is not coughing up anything because he feels like it is stuck in his chest. Denies fever, chills, chest pain, palpitations,  abdominal pain, n/v/d, dysuria, headaches, or any other symptoms at this time.      In ED: AF, VSS on 6-8L HFNC. CBC with leukocytosis. Hgb 11.9. CMP notable for Na 135, mild elevation in ALT. Phos 2.4. BNP 62. HS trop 12. LA 0.9. covid/flu negative. ABG with pH 7.499, pCO2 56.3, pO2 79, HCO3 43.8. Blood cultures pending. CTA chest with no evidence of acute PE, mild ill-defined airspace opacities in the dependent portions of the right upper and middle lobes when compared to 05/22/2025, nonspecific.  This could be due to atelectasis, aspiration and/or pneumonia 5 mm left upper lobe nodule, unchanged from 05/22/2025 but new when compared to 03/05/2025.  A follow-up chest CT in 1 year could be considered if the patient is at increased risk of developing lung cancer, such as from a smoking history. S/p duoneb, dexamethasone 10mg inj, vanc and zosyn in ED. Admitted to  for further evaluation.     6/4/2025 Patient seen for wound to left leg  Patient seen lying in bed. No acute distress. Wound care done with nurse. No issues or complaints at  time. Cont POC Plan to f/u on 6/5 6/6/2025 Patient seen lying in bed. No acute distress. Wound care done with nurse. No issues or complaints at time. Cont POC Plan to f/u on 6/9 6/12/2025 Patient seen lying in bed. Patient returned from acute stay. No acute distress. Wound care done with nurse. No issues or complaints at time. Cont POC Plan to f/u on 6/13 6/13/2925 Patient seen lying in bed. No acute distress. Wound care done with nurse. No issues or complaints at time. Cont POC Plan to f/u on  6/16      Past medical history:     Past Medical History:   Diagnosis Date    Benign neoplasm of colon 10/01/2013    Bronchitis chronic     CAD (coronary artery disease) 10/31/2013    Cataract 2008    COPD (chronic obstructive pulmonary disease)     Emphysema of lung     Encounter for preventive health examination 03/21/2018    GERD (gastroesophageal reflux disease)     Glaucoma 2010    Hearing loss 2015    Heart attack 2013    Hx of colonic polyps     Hypertension     NAFLD (nonalcoholic fatty liver disease) 03/27/2017    SHOLA on CPAP     RLS (restless legs syndrome)     Screen for colon cancer 08/30/2013     Past medical history was reviewed and was otherwise negative except as above.    Past surgical history:     Past Surgical History:   Procedure Laterality Date    bilateral foot surgery  1993    CARDIAC CATHETERIZATION  2013    x1    CATARACT EXTRACTION, BILATERAL      COLON SURGERY  2013    Ace Mott MD    COLONOSCOPY N/A 03/21/2018    Procedure: COLONOSCOPY;  Surgeon: Terry Mott MD;  Location: [address]);  Service: Endoscopy;  Laterality: N/A;    COLONOSCOPY N/A 04/12/2019    Procedure: COLONOSCOPY;  Surgeon: John Mantilla MD;  Location: [address]);  Service: Endoscopy;  Laterality: N/A;    COLONOSCOPY N/A 05/31/2022    Procedure: COLONOSCOPY;  Surgeon: MEDINA Farris MD;  Location: [facility] ([address] [place] [address]);  Service: Endoscopy;  Laterality: N/A;  fully vacc-inst portal-tb     ENDOBRONCHIAL ULTRASOUND Bilateral 04/30/2024    Procedure: ENDOBRONCHIAL ULTRASOUND (EBUS);  Surgeon: Naveed Aguero MD;  Location: [facility] OR;  Service: Pulmonary;  Laterality: Bilateral;    EYE SURGERY  2011    scrotal abscess removal      TYMPANOSTOMY TUBE PLACEMENT  2017     Past surgical history was reviewed and was noncontributory except as above.    Allergies:     Review of patient's allergies indicates:   Allergen Reactions    Brilinta [ticagrelor] Itching    Lisinopril      Other reaction(s): cough    Metoprolol succinate Rash     Allergies were reviewed and were negative except as above.    Home Medications:     Prior to Admission medications    Medication Sig Start Date End Date Taking? Authorizing Provider   acetaminophen (TYLENOL) 500 MG tablet Take 500 mg by mouth 2 (two) times daily as needed for Pain.    Provider, Historical   albuterol (ACCUNEB) 0.63 mg/3 mL Nebu Inhale 3 mLs (0.63 mg total) by nebulization every 6 (six) hours as needed (if symptoms failed to improve with inhaler). Rescue 12/10/24   Bienvenido Ward MD   albuterol (PROVENTIL/VENTOLIN HFA) 90 mcg/actuation inhaler Inhale 2 puffs into the lungs every 4 (four) hours as needed for Wheezing. 12/10/24   Bienvenido Ward MD   amitriptyline (ELAVIL) 25 MG tablet Take 1 tablet (25 mg total) by mouth once daily.  Patient taking differently: Take 25 mg by mouth every evening. 9/3/24   Sierra Landry MD   aspirin (ECOTRIN) 81 MG EC tablet Take 81 mg by mouth every morning.    Provider, Historical   atorvastatin (LIPITOR) 80 MG tablet Take 1 tablet (80 mg total) by mouth in the morning. 4/21/25   Sierra Landry MD   BETA-CAROTENE,A, W-C & E/MIN (OCUVITE ORAL) Take 1 capsule by mouth once daily.     Provider, Historical   budesonide-glycopyr-formoterol (BREZTRI AEROSPHERE) 160-9-4.8 mcg/actuation HFAA Inhale 2 puffs into the lungs 2 (two) times a day. 12/10/24   Bienvenido Ward MD   dexAMETHasone (DECADRON) 4 MG Tab  Take 1 tablet (4 mg total) by mouth 2 (two) times daily.  Patient not taking: Reported on 5/16/2025 3/26/25   Bienvenido Henson MD   echinacea 400 mg Cap Take 1 capsule by mouth once daily.     Provider, Historical   fluticasone propionate (FLONASE) 50 mcg/actuation nasal spray Spray 2 sprays (100 mcg total) in Each Nostril once daily. 10/23/24   Caren Shah MD   furosemide (LASIX) 20 MG tablet Take 1 tablet (20 mg total) by mouth once daily. 4/15/25 4/15/26  Bienvenido Ward MD   ibuprofen (ADVIL,MOTRIN) 200 MG tablet Take 200 mg by mouth every 8 (eight) hours as needed for Pain. 2/21/25   Provider, Historical   latanoprost 0.005 % ophthalmic solution Instill 1 drop into both eyes every night at bedtime 1/30/25      levETIRAcetam (KEPPRA) 500 MG Tab Take 1 tablet (500 mg total) by mouth 2 (two) times daily. 3/24/25 3/24/26  Janene Nelson PA-C   losartan (COZAAR) 100 MG tablet Take 1 tablet (100 mg total) by mouth once daily. 12/19/24   Prince Ba MD   nitroGLYCERIN (NITROSTAT) 0.4 MG SL tablet Place 1 tablet (0.4 mg total) under the tongue every 5 (five) minutes as needed. 5/6/24   Prince Ba MD   omeprazole (PRILOSEC) 20 MG capsule Take 20 mg by mouth once daily.    Provider, Historical   polyethylene glycol (GLYCOLAX) 17 gram/dose powder Take 17 grams by mouth every day for constipation prevention 5/20/25      roflumilast (DALIRESP) 500 mcg Tab Take 500 mcg by mouth every morning. 2/22/25   Provider, Historical   sennosides (SENNA ORAL) Take 1 tablet by mouth daily as needed (Constipation).    Provider, Historical   traZODone (DESYREL) 50 MG tablet Take 1 tablet (50 mg total) by mouth every evening. 1/14/25   Lou Muro NP       Family History:     Family History   Problem Relation Name Age of Onset    Heart disease Mother jc deutsch     Heart attack Mother jc deutsch     Hypertension Mother jc get     No Known Problems Sister      No Known Problems Daughter 2     Diabetes  "Maternal Grandmother imtiaz jennings     Asthma Neg Hx      Emphysema Neg Hx      Prostate cancer Neg Hx      Cirrhosis Neg Hx       Family history was reviewed and was otherwise negative except as above.    Social History:   Social History[1]  Social history was reviewed and was otherwise negative except as above    Review of Systems   A 10 point review of systems was conducted and was negative except as described in the HPI.  Patient denies Chest Pain.  Patient denies shortness of breath.  Patient denies fevers.  Patient denies chills.    Physical Examination:   Triage: BP: 123/77  Pulse: 101  Temp: 97.6 °F (36.4 °C)  Resp: 18  Height: 5' 9" (175.3 cm)  Weight: 86.8 kg (191 lb 5.8 oz) (witness by SALO Escalante and C.CGreg RT.)  BMI (Calculated): 28.2  SpO2: 97 %  Exam: /86 (BP Location: Left arm, Patient Position: Sitting)   Pulse 93   Temp 97.6 °F (36.4 °C) (Oral)   Resp (!) 22   Ht 5' 9.02" (1.753 m)   Wt 88 kg (194 lb 0.1 oz)   SpO2 98%   BMI 28.64 kg/m²     Vitals  BP  Min: 113/62  Max: 139/74  Temp  Av °F (36.7 °C)  Min: 97.4 °F (36.3 °C)  Max: 98.3 °F (36.8 °C)  Pulse  Av.1  Min: 66  Max: 95  Resp  Av.7  Min: 14  Max: 22  SpO2  Av.7 %  Min: 93 %  Max: 99 %  Height  Av' 9.02" (175.3 cm)  Min: 5' 9.02" (175.3 cm)  Max: 5' 9.02" (175.3 cm)  Weight  Av kg (194 lb 0.1 oz)  Min: 88 kg (194 lb 0.1 oz)  Max: 88 kg (194 lb 0.1 oz)    General Pt alert and oriented, not in apparent distress, good nutrition  Eyes: No conjunctival erythema; normal appearing lids  HEENT: Normocephalic atraumatic, mucous membranes moist  Cardiovascular: No edema  Respiratory: Non-labored, no accessory muscle use, no wheezing  Abdomen: Soft, non tender, non distended, no rebound  Musculoskeletal: no clubbing or cyanosis of digits  Neuro: Grossly intact, weakness upper/lower extremities  Psych: Good mood, full affect, good insight  Skin:    Wound 25 0233 Moisture associated dermatitis Left " dorsal Foot   Date First Assessed/Time First Assessed: 05/19/25 0233   Present on Original Admission: Yes  Primary Wound Type: Moisture associated dermatitis  Side: Left  Orientation: dorsal  Location: Foot  Is this injury device related?: No   Wound Image                            Dressing Appearance Dry   Drainage Amount None   Drainage Characteristics/Odor No odor   Appearance Dry   Periwound Area Intact   Care Wound cleanser   Dressing Applied  (Lac-hydrin 12%)        Wound 06/04/25 1030 Moisture associated dermatitis Right lateral Abdomen   Date First Assessed/Time First Assessed: 06/04/25 1030   Present on Original Admission: Yes  Primary Wound Type: Moisture associated dermatitis  Side: Right  Orientation: lateral  Location: Abdomen   Wound Image    Dressing Appearance Open to air   Drainage Amount None   Red (%), Wound Tissue Color 100 %   Periwound Area Moist   Care Wound cleanser   Dressing    (applied Triad paste, open to air)        Wound 06/04/25 1030 Moisture associated dermatitis Right Groin   Date First Assessed/Time First Assessed: 06/04/25 1030   Present on Original Admission: Yes  Primary Wound Type: Moisture associated dermatitis  Side: Right  Location: Groin  Is this injury device related?: No   Wound Image    Dressing Appearance Open to air;No dressing   Drainage Amount None   Red (%), Wound Tissue Color 100 %   Periwound Area Intact   Care Cleansed with:;Wound cleanser   Dressing    (applied Triad paste, open to air)        Wound 06/04/25 1030 Moisture associated dermatitis Left Groin   Date First Assessed/Time First Assessed: 06/04/25 1030   Present on Original Admission: Yes  Primary Wound Type: Moisture associated dermatitis  Side: Left  Location: Groin   Wound Image    Dressing Appearance Open to air   Drainage Amount None   Appearance Pink   Red (%), Wound Tissue Color 100 %   Periwound Area Moist   Care Cleansed with:;Wound cleanser   Dressing    (applied Triad paste, open to air)         Wound 06/04/25 1030 Moisture associated dermatitis Sacral spine   Date First Assessed/Time First Assessed: 06/04/25 1030   Present on Original Admission: Yes  Primary Wound Type: (c) Moisture associated dermatitis  Location: (c) Sacral spine   Wound Image                            Dressing Appearance Open to air   Drainage Amount None   Drainage Characteristics/Odor No odor   Red (%), Wound Tissue Color 100 %   Periwound Area Moist   Care Wound cleanser   Dressing Applied  (applied Triad paste, open to air)        Wound 06/09/25 Pressure Injury Nose   Date First Assessed: 06/09/25   Present on Original Admission: Yes  Primary Wound Type: Pressure Injury  Location: Nose   Wound Image    Dressing Appearance Open to air;Dry   Drainage Amount None   Appearance Pink;Maroon   Red (%), Wound Tissue Color 100 %   Periwound Area Intact;Dry   Wound Length (cm) 0 cm   Wound Width (cm) 0 cm   Wound Depth (cm) 0 cm   Wound Volume (cm^3) 0 cm^3   Wound Surface Area (cm^2) 0 cm^2   Care Cleansed with:;Wound cleanser   Skin Interventions   Device Skin Pressure Protection pressure points protected;skin-to-device areas padded;tubing/devices free from skin contact   Pressure Reduction Devices pressure-redistributing mattress utilized;specialty bed utilized   Skin Protection hydrocolloids used       Laboratory:  Most Recent Data:  CBC:   Lab Results   Component Value Date    WBC 11.92 06/13/2025    HGB 10.0 (L) 06/13/2025    HCT 31.1 (L) 06/13/2025     (L) 06/13/2025    MCV 88 06/13/2025    RDW 15.7 (H) 06/13/2025     BMP:   Lab Results   Component Value Date     06/13/2025    K 4.0 06/13/2025     06/13/2025    CO2 31 (H) 06/13/2025    BUN 37 (H) 06/13/2025    CREATININE 1.1 06/13/2025     (H) 06/13/2025    CALCIUM 8.6 (L) 06/13/2025    MG 2.1 06/13/2025    PHOS 2.1 (L) 06/13/2025     LFTs:   Lab Results   Component Value Date    PROT 5.0 (L) 06/13/2025    ALBUMIN 2.0 (L) 06/13/2025    BILITOT 0.3 06/13/2025  "   AST 43 06/13/2025    ALKPHOS 130 06/13/2025     (H) 06/13/2025     Coags:   Lab Results   Component Value Date    INR 1.0 02/07/2025     FLP:   Lab Results   Component Value Date    CHOL 101 (L) 02/07/2025    HDL 35 (L) 02/07/2025    LDLCALC 53.2 (L) 02/07/2025    TRIG 64 02/07/2025    CHOLHDL 34.7 02/07/2025     DM:   Lab Results   Component Value Date    HGBA1C 5.7 (H) 03/06/2024    HGBA1C 5.6 10/12/2023    HGBA1C 5.8 05/30/2013    GLUF 103 05/19/2014    LDLCALC 53.2 (L) 02/07/2025    CREATININE 1.1 06/13/2025     Thyroid:   Lab Results   Component Value Date    TSH 0.261 (L) 06/01/2025    FREET4 0.99 06/01/2025    U8PPOOJ 9.1 03/06/2024    E4MFNXO 86 03/07/2017     Anemia:   Lab Results   Component Value Date    IRON 28 (L) 08/29/2024    TIBC 266 08/29/2024    FERRITIN 2,080 (H) 08/29/2024    MPIIXVVM66 474 08/29/2024    FOLATE 4.5 08/29/2024     Cardiac:   Lab Results   Component Value Date    TROPONINI 10.800 (H) 06/08/2025    BNP 62 06/09/2025     Urinalysis:   Lab Results   Component Value Date    COLORU Yellow 06/09/2025    SPECGRAV 1.010 06/09/2025    NITRITE Negative 06/09/2025    KETONESU Trace (A) 02/07/2025    UROBILINOGEN Negative 06/09/2025    WBCUA 4 06/09/2025       Trended Lab Data:  Recent Labs   Lab 06/10/25  0330 06/11/25  0441 06/11/25  0442 06/13/25  0413   WBC 12.20 11.82  --  11.92   HGB 11.6* 10.9*  --  10.0*   * 143*  --  138*   MCV 90 88  --  88   RDW 15.9* 16.0*  --  15.7*   *  --  135* 143   K 4.0  --  3.1* 4.0   CL 92*  --  94* 102   CO2 29  --  30* 31*   BUN 34*  --  34* 37*   *  --  162* 167*   CALCIUM 9.0  --  8.3* 8.6*   PROT 6.2  --  5.1* 5.0*   ALBUMIN 2.2*  --  1.9* 2.0*   BILITOT 0.6  --  0.5 0.3   *  --  121* 43   ALKPHOS 143  --  116 130   *  --  157* 111*       Trended Cardiac Data:  Recent Labs   Lab 06/08/25  1937 06/09/25  0905   TROPONINI 10.800*  --    BNP  --  62       Micro Results  No components found for: "CBLOOD"  No " "components found for: "CURINE"  No components found for: "CRESPWSM"    Radiology:  Echo  Result Date: 6/11/2025    Left Ventricle: The left ventricle is normal in size. Ventricular mass is normal. Normal wall thickness. There is low normal systolic function with a visually estimated ejection fraction of 50 - 55%.   Inferolateral pseudodyskinesis   Right Ventricle: Right ventricle was not well visualized due to poor acoustic window. The right ventricle is normal in size Systolic function is mildly reduced.   IVC/SVC: Intermediate venous pressure at 8 mmHg.   RV function not well seen.     CTA Chest Non-Coronary (PE Studies)  Result Date: 6/10/2025  EXAMINATION: CTA CHEST NON CORONARY (PE STUDIES) CLINICAL HISTORY: r/o PE, signifcant tachycardia; TECHNIQUE: Low dose axial images, sagittal and coronal reformations were obtained from the thoracic inlet to the lung bases following the IV administration of 100 mL of Omnipaque 350.  Contrast timing was optimized to evaluate the pulmonary arteries.  MIP images were performed. COMPARISON: None FINDINGS: Pulmonary arteries:Pulmonary arteries are adequately enhanced.No filling defects to indicate pulmonary embolism. Thoracic soft tissues: Unremarkable. Aorta: Mild aneurysmal dilation of the ascending thoracic aorta to approximately 4.2 cm axial 256 of series 2. Heart: Normal size. No effusion. Nathalie/Mediastinum: No pathologic carolyn enlargement. Airways: Patent. Lungs/Pleura: Prominent emphysematous changes throughout the lungs. Mild interstitial infiltrate or scarring posterior right upper lobe. Mild left basilar atelectasis. Minimal left upper lobe ground-glass infiltrate. Esophagus: Unremarkable. Upper Abdomen: No abnormality of the partially imaged upper abdomen. Bones: Moderate chronic compression fracture superior endplate of L1. Aberrant right subclavian artery noted as an anatomic variant.     1. No evidence of pulmonary embolism. 2. Mild aneurysm dilation of the " ascending thoracic aorta to approximately 4.2 cm.  Recommend surveillance. 3. Emphysematous changes of the lungs. 4. Mild interstitial infiltrate or scarring in the posterior right upper lobe and minimal left upper lobe infiltrate.  Minimal pneumonitis is not excluded. 5. Aberrant right subclavian artery noted as an anatomic variant. 6. Moderate chronic compression fracture of the superior endplate of L1 Electronically signed by: Ryan Jackson Date:    06/10/2025 Time:    21:38    CTA Chest Non-Coronary (PE Studies)  Result Date: 6/9/2025  EXAMINATION: CTA CHEST NON CORONARY (PE STUDIES) CLINICAL HISTORY: Pulmonary embolism (PE) suspected, high prob; TECHNIQUE: During intravenous bolus injection of 100 ml of Omnipaque 350 contrast medium and using low dose technique, the chest was surveyed from above the pulmonary apices through the posterior costophrenic angles data was reconstructed for multiplanar images in axial, sagittal and coronal planes and for maximal intensity projection images in the the axial, sagittal and coronal planes. COMPARISON: Multiple priors, most recent CTA 05/22/2025 FINDINGS: Exam quality: Satisfactory. Pulmonary embolism: No acute or chronic pulmonary embolism. Central pulmonary arteries: Normal caliber. Aorta: Normal caliber.  Mild atherosclerosis.  Aberrant right subclavian artery. Heart: No right heart strain. Normal size. Coronary arteries: Multi-vessel calcific atherosclerosis of the coronary arteries. Pericardium: Normal. No effusion, thickening, or calcification. Support tubes and lines: None. Base of neck/thyroid: Normal. Lymph nodes: No supraclavicular, axillary, internal mammary, mediastinal, or hilar adenopathy. Esophagus: Normal. Pleura: No effusion, thickening, or calcification. Upper abdomen: Accessory splenule.  Fatty atrophy of the pancreas. Body wall: Unremarkable Airways: Normal. Lungs: Centrilobular and paraseptal emphysema.  Treatment changes of the left hilar region with  associated volume loss and architectural distortion, stable when compared to prior imaging.  Right bandlike scarring versus atelectasis.  Left lower lobe calcified granuloma.  Ill-defined airspace opacities present in the dependent portions of the right upper and middle lobes are new when compared to 05/22/2025.  A 4 mm nodule in the left upper lobe (series 3, image 257) is unchanged from 05/22/2025 but new when compared to 03/05/2025. Bones: Degenerative changes of the spine.  Height loss of the superioror endplate of L1, unchanged from 02/20/2025.  Unchanged sclerotic focus in the posterior elements of T9.     No evidence of acute pulmonary embolism. New mild ill-defined airspace opacities in the dependent portions of the right upper and middle lobes when compared to 05/22/2025, nonspecific.  This could be due to atelectasis, aspiration and/or pneumonia. 5 mm left upper lobe nodule, unchanged from 05/22/2025 but new when compared to 03/05/2025.  A follow-up chest CT in 1 year could be considered if the patient is at increased risk of developing lung cancer, such as from a smoking history. Additional findings as above. Electronically signed by resident: Madeline Iniguez Date:    06/09/2025 Time:    10:55 Electronically signed by: Anna Brown Date:    06/09/2025 Time:    11:55    X-Ray Chest AP Portable  Result Date: 6/9/2025  EXAMINATION: XR CHEST AP PORTABLE CLINICAL HISTORY: sob; TECHNIQUE: Single frontal view of the chest was performed. COMPARISON: 06/01/25 FINDINGS: Cardiac size is normal.  Lungs are clear with no significant infiltrate or vascular congestion.  No pleural fluid is seen.     See above Electronically signed by: Prince Ovalle MD Date:    06/09/2025 Time:    09:29    X-Ray Chest AP Portable  Result Date: 6/1/2025  EXAMINATION: XR CHEST AP PORTABLE CLINICAL HISTORY: palpitations; TECHNIQUE: Single frontal view of the chest was performed. COMPARISON: 05/26/2025. FINDINGS: The lungs are well  expanded and clear. No focal opacities are seen. The pleural spaces are clear. The cardiac silhouette is unremarkable. The visualized osseous structures are unremarkable.     No acute abnormality. Electronically signed by: Popeye Mckeon Date:    06/01/2025 Time:    22:33    X-Ray Chest AP Portable  Result Date: 5/26/2025  EXAMINATION: XR CHEST AP PORTABLE CLINICAL HISTORY: sob; FINDINGS: Chest one view AP portable. Heart size is normal.  Lungs are clear.  The bones are noncontributory.     No acute process seen. Electronically signed by: Alexander Blankenship MD Date:    05/26/2025 Time:    09:16    X-Ray Chest AP Portable  Result Date: 5/25/2025  EXAMINATION: XR CHEST AP PORTABLE CLINICAL HISTORY: chf; TECHNIQUE: Single frontal view of the chest was performed. COMPARISON: 05/22/2025 FINDINGS: The cardiomediastinal silhouette is stable in configuration noting calcification of the aorta..  There is no pleural effusion.  The trachea is midline.  The lungs are symmetrically expanded bilaterally with coarse interstitial attenuation, similar to the previous examination.  There is increased parenchymal attenuation projected over the medial aspect of the right lower lung zone, may reflect atelectasis, developing consolidation not excluded..  There is no pneumothorax.  The osseous structures are unchanged..     As above Electronically signed by: Artur Velazquez MD Date:    05/25/2025 Time:    19:17    Echo  Result Date: 5/23/2025    Tachycardia was present during the study   Erall the study quality was technically difficult. valves not well visualized.   Left Ventricle: The left ventricle is normal in size. Normal wall thickness. Normal wall motion. There is normal systolic function with a visually estimated ejection fraction of 65 - 70%. There is normal diastolic function. Normal left ventricular filling pressure.   Right Ventricle: The right ventricle is normal in size Wall thickness is normal. Systolic function is normal.    Left Atrium: Left atrium was not well visualized.   Right Atrium: Not well visualized due to poor acoustic window.   Aortic Valve: Not well visualized due to poor acoustic window.   Mitral Valve: Not well visualized due to poor acoustic window.   Tricuspid Valve: Not well visualized due to poor acoustic window.   Pulmonic Valve: Not well visualized due to poor acoustic window.   Aorta: The aortic root is normal in size measuring 2.79 cm. The ascending aorta is normal in size measuring 2.7 cm.   IVC/SVC: Normal venous pressure at 3 mmHg.   Pericardium: There is no pericardial effusion.     US Lower Extremity Veins Bilateral  Result Date: 5/22/2025  EXAMINATION: US LOWER EXTREMITY VEINS BILATERAL CLINICAL HISTORY: dvt; TECHNIQUE: Duplex and color flow Doppler and dynamic compression was performed of the bilateral lower extremity veins was performed. COMPARISON: 05/15/2025. FINDINGS: Right thigh veins: The common femoral, femoral, popliteal, upper greater saphenous, and deep femoral veins are patent and free of thrombus. The veins are normally compressible and have normal phasic flow and augmentation response. Right calf veins: The visualized calf veins are patent. Left thigh veins: The common femoral, femoral, popliteal, upper greater saphenous, and deep femoral veins are patent and free of thrombus. The veins are normally compressible and have normal phasic flow and augmentation response. Left calf veins: The visualized calf veins are patent. Miscellaneous: None     No evidence of deep venous thrombosis in either lower extremity. Electronically signed by: Vidal Arroyo MD Date:    05/22/2025 Time:    18:06    CTA Chest Non-Coronary (PE Studies)  Result Date: 5/22/2025  EXAMINATION: CTA CHEST NON CORONARY (PE STUDIES) CLINICAL HISTORY: Pulmonary embolism (PE) suspected, unknown D-dimer; TECHNIQUE: Low dose axial images, sagittal and coronal reformations were obtained from the thoracic inlet to the lung bases following  the IV administration of 100 mL of Omnipaque 350.  Contrast timing was optimized to evaluate the pulmonary arteries.  MIP images were performed. COMPARISON: Radiograph 05/22/2025, CT abdomen and pelvis 05/19/2025.  CT chest 03/05/2025, CTA chest 03/19/2024, CTA chest 12/28/2024 FINDINGS: The structures at the base of the neck are unremarkable.  No significant mediastinal lymphadenopathy.  The heart is not enlarged.  The thoracic aorta tapers normally noting aberrant right subclavian.  There is atherosclerotic calcification of the aorta and in the distribution of the coronary arteries.  The visualized portions of the spleen and adrenal glands are unremarkable.  There is fatty atrophy of the pancreas.  The gallbladder is distended without wall thickening.  The liver is unremarkable.  There is bilateral perinephric fat stranding, correlation with urinalysis recommended. Motion artifact limits evaluation of the airways and pulmonary parenchyma.  There are aerated secretions layering posteriorly within the upper trachea, placing patient at risk for aspiration.  The airways are otherwise patent.  There is bilateral pulmonary emphysematous change.  There is scattered interlobular septal thickening.  There is a nodular focus within the anterior aspect of the left upper lobe measuring in conglomerate 5-6 mm not seen on the previous exam.  There is bilateral basilar dependent atelectasis.  No large focal consolidation.  No pneumothorax. There are post treatment changes within the left hilar region, less conspicuous on current exam. Bolus timing is adequate for evaluation of pulmonary thromboembolism.  Allowing for artifact from motion, no convincing pulmonary arterial filling defects to the level of the segmental branches bilaterally to suggest pulmonary thromboembolism. There is osteopenia.  There are degenerative changes of the spine.  No significant axillary lymphadenopathy.  There is height loss involving the superior  endplate of L1 new since examination 03/05/2025, correlation with any focal tenderness recommended.  There is a sclerotic focus within the posterior elements of T9 stable.     1. Allowing for motion artifact, no convincing pulmonary thromboembolism.  Correlation of these findings with D-dimer and/or lower extremity ultrasound as warranted. 2. Pulmonary emphysematous change noting superimposed mild edema.  There is a nodular focus within the anterior aspect of the left upper lobe, not convincingly seen on the previous examination.  Developing infectious or inflammatory process is a consideration, continued follow-up is recommended to exclude discrete nodule in the setting of malignancy. 3. Aerated secretion within the proximal trachea, places patient at risk for aspiration. 4. Endplate compression deformity of L1, new since examination 03/05/2025 however stable from examination 05/19/2025. 5. Please see above for several additional findings. Electronically signed by: Artur Velazquez MD Date:    05/22/2025 Time:    17:21    X-Ray Foot Complete Left  Result Date: 5/22/2025  EXAMINATION: XR FOOT COMPLETE 3 VIEW LEFT CLINICAL HISTORY: .  Cellulitis, unspecified TECHNIQUE: AP, lateral and oblique views of the left foot were performed. COMPARISON: None FINDINGS: Three views left foot. There is osteopenia.  There are degenerative changes of the foot.  There is remote fracture of the fifth metatarsal.  Bandaging material overlies the lateral aspect of the foot.  No radiopaque foreign body.  There are degenerative changes of the calcaneus.  There is edema primarily about the dorsal aspect of the foot.     1. No convincing acute displaced fracture or dislocation of the foot noting diffuse edema particularly along the dorsal aspect.  Correlation is advised. Electronically signed by: Artur Velazquez MD Date:    05/22/2025 Time:    14:46    X-Ray Chest AP Portable  Result Date: 5/22/2025  EXAMINATION: XR CHEST AP PORTABLE  CLINICAL HISTORY: CHF; TECHNIQUE: Single frontal view of the chest was performed. COMPARISON: 02/07/2025 FINDINGS: The cardiomediastinal silhouette is not enlarged noting calcification of the aorta..  There is no pleural effusion.  The trachea is midline.  The lungs are symmetrically expanded bilaterally with coarse interstitial attenuation bilaterally noting scattered regions of bandlike atelectasis, similar to the previous examination.  No large focal consolidation seen.  There is no pneumothorax.  The osseous structures are remarkable for degenerative change..     1. Chronic appearing interstitial findings, superimposed edema remains a consideration.  No large focal consolidation. Electronically signed by: Artur Velazquez MD Date:    05/22/2025 Time:    13:15    CT Abdomen Pelvis With IV Contrast NO Oral Contrast  Result Date: 5/19/2025  EXAMINATION: CT ABDOMEN PELVIS WITH IV CONTRAST CLINICAL HISTORY: Epigastric pain;SBO rule out; TECHNIQUE: Low dose axial images, sagittal and coronal reformations were obtained from the lung bases to the pubic symphysis following the IV administration of 75 mL of Omnipaque 350 COMPARISON: CT of the chest, abdomen, and pelvis 03/05/2020 FINDINGS: Lower chest: Partially imaged cardiac valvular calcifications.  Atelectasis at the lung bases.  Patchy airspace consolidation in the medial basilar right lower lobe. Liver: Normal contour.  Subcentimeter hypodense lesion right hepatic lobe too small to definitely characterize. Gallbladder and bile ducts: Somewhat hydropic appearance of the gallbladder. No definite radiodense gallstones or pericholecystic inflammation.  No concerning biliary ductal dilatation. Pancreas: Unremarkable. Spleen: Normal contour.  At least 2 accessory splenule suggested. Adrenals: Unremarkable. Kidneys: Unchanged appearance of the kidneys compared to 03/05/2025 CT.  Unchanged presumed cysts in the left kidney.  Additional subcentimeter hypodense lesions are too  small to definitely characterize.  Nonspecific bilateral perinephric stranding again seen. Lymph nodes: No abdominal or pelvic lymphadenopathy. Bowel and mesentery: Postoperative changes of the bowel again noted in keeping with partial colectomy.  Colonic diverticulosis.  Relatively diffuse fluid distended colonic loops.  Short segment focal narrowing of the sigmoid colon (axial series 2, image 110).  Small bowel normal caliber.Appendix not seen and may be surgically absent. Abdominal aorta: Nonaneurysmal.  Moderate to heavy atherosclerosis. Inferior vena cava: Unremarkable. Free fluid or free air: None. Pelvis: Unremarkable. Body wall: Small fat containing umbilical and infraumbilical ventral hernias. Bones: No acute change.  Suspect osseous demineralization.  No change in configuration of recent burst type fracture of the L1 vertebra compared to dedicated CT of the lumbar spine performed 05/14/2025.     1. Relatively diffusely dilated fluid-filled colonic loops, nonspecific.  Findings could reflect a nonspecific diarrheal illness, possibly infectious or noninfectious inflammatory in nature.  Noting this, there is an apparent short segment focal narrowing of the sigmoid colon, which could reflect peristalsis, however stricture or neoplasm difficult to exclude in this region.  Attention on CT follow-up recommended.  Additionally, consider colonoscopy for further characterization, if not recently performed. 2. Patchy airspace consolidation in the basilar right lower lobe, possibly infectious or noninfectious inflammatory in etiology.  Correlation advised. 3. Additional details of chronic and incidental findings, as provided in the body of the report. Electronically signed by: Bolivar Gonsales Date:    05/19/2025 Time:    11:31    US Lower Extremity Veins Bilateral  Result Date: 5/15/2025  EXAMINATION: US LOWER EXTREMITY VEINS BILATERAL CLINICAL HISTORY: r/o DVT; TECHNIQUE: Duplex and color flow Doppler and dynamic  compression was performed of the bilateral lower extremity veins was performed. COMPARISON: None FINDINGS: Right thigh veins: The common femoral, femoral, popliteal, upper greater saphenous, and deep femoral veins are patent and free of thrombus. The veins are normally compressible and have normal phasic flow and augmentation response. Right calf veins: The visualized calf veins are patent. Left thigh veins: The common femoral, femoral, popliteal, upper greater saphenous, and deep femoral veins are patent and free of thrombus. The veins are normally compressible and have normal phasic flow and augmentation response. Left calf veins: The visualized calf veins are patent. Miscellaneous: None     No evidence of deep venous thrombosis in either lower extremity. Electronically signed by: Bolivar Resendiz MD Date:    05/15/2025 Time:    14:36    Echo  Result Date: 5/15/2025    Left Ventricle: The left ventricle is normal in size. Ventricular mass is normal. Normal wall thickness. Normal wall motion. There is low normal systolic function with a visually estimated ejection fraction of 50 - 55%. Ejection fraction is approximately 55%. There is normal diastolic function.   Right Ventricle: The right ventricle is normal in size Wall thickness is normal. Systolic function is normal.   IVC/SVC: Normal venous pressure at 3 mmHg.     MRI Lumbar Spine W WO Cont  Result Date: 5/14/2025  EXAMINATION: MRI LUMBAR SPINE W WO CONTRAST CLINICAL HISTORY: Compression fracture, lumbar; TECHNIQUE: Multiplanar, multisequence MR images were acquired from the thoracolumbar junction to the sacrum without and with the administration of contrast.  The patient received 10 cc of Gadavist IV. COMPARISON: CT L-spine same-date. FINDINGS: Lumbar spine alignment is within normal limits. Acute L1 superior endplate compression fracture, with mild anterior height loss.  2 mm osseous retropulsion.  No resultant spinal canal stenosis or neural foraminal  narrowing. No marrow signal abnormality suspicious for an infiltrative process.  L2 and L5 vertebral body hemangiomas.  There is a some mild increased edema in the L2 vertebral body with no fracture identified. The conus is normal in appearance, and terminates at the L2 level. Mild disc space narrowing at L4-L5.  Multilevel disc desiccation.There are findings of lumbar spondylosis, as below. T12-L1: No spinal canal stenosis or neural foraminal narrowing. L1-L2: No spinal canal stenosis or neural foraminal narrowing. L2-L3: Mild disc bulge.  Mild facet arthropathy and ligamentum flavum thickening.  Mild bilateral foraminal narrowing.  Mild lateral recess stenosis. L3-L4: Mild disc bulge.  Mild facet arthropathy and ligamentum flavum thickening.  Mild bilateral foraminal narrowing.  Mild lateral recess stenosis. L4-L5: Disc bulge with moderate facet arthropathy and ligamentum flavum thickening.  Mild bilateral foraminal narrowing.  Mild spinal canal stenosis. L5-S1: Advanced facet arthropathy.  No spinal canal stenosis or neural foraminal narrowing. The adjacent soft tissue structures demonstrate bilateral renal cysts.  Colonic diverticulosis. Following the administration of intravenous contrast, there is enhancement of the L1 endplate at the level of the fracture site. There is enhancement within the anterior prevertebral soft tissues extending from T12-L1. There is enhancement of the L2 intraosseous hemangioma.     1.  Subacute compression fracture of the L1 superior endplate.  Mild anterior height loss and 2 mm osseous retropulsion.  No resultant spinal canal stenosis or neural foraminal narrowing at that level. 2.  Nonspecific enhancement in the anterior prevertebral soft tissues from T12-L1.  This may be reactive in nature. 3.  Intraosseous hemangioma in the L2 vertebral body with associated minimal increased edema.  Some component of bony contusion at this level with present. 4.  Mild lumbar spondylosis as  described above. Electronically signed by resident: Franki Funes Date:    05/14/2025 Time:    18:26 Electronically signed by: Vidal Arroyo MD Date:    05/14/2025 Time:    18:51      Recent Results (from the past 24 hours)   Magnesium    Collection Time: 06/13/25  4:13 AM   Result Value Ref Range    Magnesium  2.1 1.6 - 2.6 mg/dL   Comprehensive Metabolic Panel    Collection Time: 06/13/25  4:13 AM   Result Value Ref Range    Sodium 143 136 - 145 mmol/L    Potassium 4.0 3.5 - 5.1 mmol/L    Chloride 102 95 - 110 mmol/L    CO2 31 (H) 23 - 29 mmol/L    Glucose 167 (H) 70 - 110 mg/dL    BUN 37 (H) 8 - 23 mg/dL    Creatinine 1.1 0.5 - 1.4 mg/dL    Calcium 8.6 (L) 8.7 - 10.5 mg/dL    Protein Total 5.0 (L) 6.0 - 8.4 gm/dL    Albumin 2.0 (L) 3.5 - 5.2 g/dL    Bilirubin Total 0.3 0.1 - 1.0 mg/dL     40 - 150 unit/L    AST 43 11 - 45 unit/L     (H) 10 - 44 unit/L    Anion Gap 10 8 - 16 mmol/L    eGFR >60 >60 mL/min/1.73/m2   Phosphorus    Collection Time: 06/13/25  4:13 AM   Result Value Ref Range    Phosphorus Level 2.1 (L) 2.7 - 4.5 mg/dL   CBC with Differential    Collection Time: 06/13/25  4:13 AM   Result Value Ref Range    WBC 11.92 3.90 - 12.70 K/uL    RBC 3.54 (L) 4.60 - 6.20 M/uL    HGB 10.0 (L) 14.0 - 18.0 gm/dL    HCT 31.1 (L) 40.0 - 54.0 %    MCV 88 82 - 98 fL    MCH 28.2 27.0 - 31.0 pg    MCHC 32.2 32.0 - 36.0 g/dL    RDW 15.7 (H) 11.5 - 14.5 %    Platelet Count 138 (L) 150 - 450 K/uL    MPV 10.6 9.2 - 12.9 fL    Nucleated RBC 0 <=0 /100 WBC   Manual Differential    Collection Time: 06/13/25  4:13 AM   Result Value Ref Range    Gran # (ANC) 10.5 K/uL    Segmented Neutrophil % 88.0 (H) 38.0 - 73.0 %    Lymphocyte % 8.0 (L) 18.0 - 48.0 %    Monocyte % 2.0 (L) 4.0 - 15.0 %    Metamyelocyte % 1.0 %    Myelocyte % 1.0 %     A1C   Lab Results   Component Value Date    HGBA1C 5.7 (H) 03/06/2024         Assessment/Plan:     Cellulitis to Left lower leg -resolved  Dermatitis to left foot   -wash with bath  wipes apply lac-hydrin daily per bedside nurse     MASD to Sacrum and Buttocks   Wound care clean with bath wipes apply Triad daily  Turn patient every 2 hours    Rash to:  RIGHT & LEFT GROIN  RIGHT & LEFT ABDOMEN  -clean with bath wipes apply antifungal powder daily per bedside nurse     Pressure wound stage I to right top ankle -resolved  -leave JOSSUE monitor     Decreased Mobility   -Patient with decreased bed mobility therefore patient is at high risk for developing wounds and deterioration of any current wounds. Patient needs frequent turning.     Nutrition :  Promote good nutrition to for improved wound healing.      Off-loading:   Follow facility bed policy for proper bed surface   Offload heels per facility protocol   Turn every 2 hours   Use Wheelchair Cushion     Patient Treatment Goals:  Short Term Goals  The wound base will be 25% .,demonstrate 25% more granulation tissue.  Short Term Goals  Patient wound status will improve/maintain without exacerbation infection of deterioration.  Wound Healing  Patient will have a decrease in wound size by 20% in 4 weeks.  Clinical Goals  Prevention of Infection is important for wound healing  Functional Goals:  The patient will achieve proper turning schedule.    -cont medical management     High Risk Because  -Chronic illness with SEVERE exacerbation, progression, or side effects of treatment.  -Acute or chronic illness or injury that poses a threat to life or bodily function    Notify Sheree Tao NP, or wound care RN if any new issues arise or if there is deterioration in documented wounds.    Sheree Tao FNP-C               [1]   Social History  Tobacco Use    Smoking status: Former     Current packs/day: 0.00     Average packs/day: 1 pack/day for 40.0 years (40.0 ttl pk-yrs)     Types: Cigarettes     Start date: 8/15/1972     Quit date: 8/15/2012     Years since quittin.8     Passive exposure: Never    Smokeless tobacco: Never   Substance Use  Topics    Alcohol use: Yes     Alcohol/week: 3.0 standard drinks of alcohol     Types: 3 Cans of beer per week     Comment: maybe 2-3  beers weekly    Drug use: No

## 2025-06-13 NOTE — PLAN OF CARE
PT alert and oriented. PT slept well overnight, PRN melatonin given, NAD noted. SR on tele. CPAP worn with good sats, otherwise on 2L NC. Pt voided per urinal, good output. Safety maintained. Bed locked in lowest position, call light within reach. All needs met.     Problem: Adult Inpatient Plan of Care  Goal: Plan of Care Review  Outcome: Progressing  Goal: Patient-Specific Goal (Individualized)  Outcome: Progressing  Goal: Absence of Hospital-Acquired Illness or Injury  Outcome: Progressing  Goal: Optimal Comfort and Wellbeing  Outcome: Progressing  Goal: Readiness for Transition of Care  Outcome: Progressing     Problem: Pneumonia  Goal: Fluid Balance  Outcome: Progressing  Goal: Resolution of Infection Signs and Symptoms  Outcome: Progressing  Goal: Effective Oxygenation and Ventilation  Outcome: Progressing     Problem: Wound  Goal: Optimal Coping  Outcome: Progressing  Goal: Optimal Pain Control and Function  Outcome: Progressing     Problem: Fall Injury Risk  Goal: Absence of Fall and Fall-Related Injury  Outcome: Progressing

## 2025-06-13 NOTE — PROGRESS NOTES
06/12/25 0830   Pain/Comfort/Sleep   Preferred Pain Scale number (Numeric Rating Pain Scale)   Pain Rating (0-10): Rest 0   Pain Rating (0-10): Activity 0   Cognitive   Level of Consciousness (AVPU) alert   [place] Coma Scale   Best Eye Response 4-->(E4) spontaneous   Best Motor Response 6-->(M6) obeys commands   Best Verbal Response 5-->(V5) oriented   [place] Coma Scale Score 15   Assessment Qualifiers No eye obstruction present   Hand /Ankle Strength   Hand , Left strong   Hand , Right strong   Dorsiflexion, Left strong   Dorsiflexion, Right strong   Plantarflexion, Left strong   Plantarflexion, Right strong   ECG   Lead Monitored Lead II;V1   Rhythm atrial rhythm   Peripheral Neurovascular   Capillary Refill, LUE less than/equal to 3 secs   Capillary Refill, RUE less than/equal to 3 secs   Capillary Refill, LLE less than/equal to 3 secs   Capillary Refill, RLE less than/equal to 3 secs   All Extremities Neurovascular Assessment   General All Extremity Temperature warm   General All Extremity Color marbella   General All Extremity Sensation no numbness;no tingling   Pulse Radial   Left Radial Pulse 2+ (normal)   Right Radial Pulse 2+ (normal)   Pulse Dorsalis Pedis   Left Dorsalis Pedis Pulse 1+ (weak)   Right Dorsalis Pedis Pulse 1+ (weak)   Edema   Edema leg, left;leg, right;ankle, right;ankle, left;foot, left;foot, right   Dependent Edema 2+ (Mild)   Generalized Edema 1+ (Trace)   Arm, Left Edema 1+ (Trace)   Arm, Right Edema 1+ (Trace)   Wrist, Left Edema 1+ (Trace)   Wrist, Right Edema 1+ (Trace)   Leg, Left Edema 3+ (Moderate)   Leg, Right Edema 3+ (Moderate)   Ankle, Left Edema 3+ (Moderate)   Ankle, Right Edema 3+ (Moderate)   Foot, Left Edema 3+ (Moderate)   Foot, Right Edema 3+ (Moderate)        Peripheral IV - Single Lumen 06/10/25 1828 20 G 1/2 in Anterior;Right Wrist   Placement Date/Time: 06/10/25 1828   IV Change Due: 06/14/25  Size (G): 20 G  Length (in): 1/2 in  Orientation:  Anterior;Right  Location: Wrist  Site Prep: Chlorhexidine   Local Anesthetic: None  Insertion attempts (enter comment if more than 2 attemp...   Site Assessment Clean;Dry;Intact;No redness;No swelling   Line Securement Device Antimicrobial Adhesive   Extremity Assessment Distal to IV No abnormal discoloration;No redness;No swelling;No warmth   Line Status Flushed;Capped   Dressing Status Clean;Dry;Intact   Dressing Intervention Integrity maintained   Dressing Change Due 06/14/25        Peripheral IV - Single Lumen 06/09/25 2000 20 G 1/2 in Left;Posterior Wrist   Placement Date/Time: 06/09/25 2000   IV Change Due: 06/13/25  Size (G): 20 G  Length (in): 1/2 in  Orientation: Left;Posterior  Location: Wrist   Site Assessment Clean;Dry;Intact;No redness;No swelling   Line Securement Device Antimicrobial Adhesive   Extremity Assessment Distal to IV No swelling;No warmth   Line Status Flushed;Capped   Dressing Status Clean;Dry;Intact   Dressing Intervention Integrity maintained   Dressing Change Due 06/14/25   Pressure Injury Prevention    Check Moisture Management Pad Done   Sacral Foam Dressing Patient incontinent, barrier cream applied   Heel protection technique Pillow   Heel preventative measures Apply   Check Medical Devices Done        Wound 05/19/25 0233 Moisture associated dermatitis Left dorsal Foot   Date First Assessed/Time First Assessed: 05/19/25 0233   Present on Original Admission: Yes  Primary Wound Type: Moisture associated dermatitis  Side: Left  Orientation: dorsal  Location: Foot  Is this injury device related?: No   Wound Image      Dressing Appearance Dry   Drainage Amount None   Drainage Characteristics/Odor No odor   Appearance Dry   Periwound Area Intact   Care Wound cleanser   Dressing Applied  (Lac-hydrin 12%)        Wound 06/04/25 1030 Moisture associated dermatitis Right lateral Abdomen   Date First Assessed/Time First Assessed: 06/04/25 1030   Present on Original Admission: Yes  Primary Wound  Type: Moisture associated dermatitis  Side: Right  Orientation: lateral  Location: Abdomen   Wound Image    Dressing Appearance Open to air   Drainage Amount None   Red (%), Wound Tissue Color 100 %   Periwound Area Moist   Care Wound cleanser   Dressing   (applied Triad paste, open to air)        Wound 06/04/25 1030 Moisture associated dermatitis Right Groin   Date First Assessed/Time First Assessed: 06/04/25 1030   Present on Original Admission: Yes  Primary Wound Type: Moisture associated dermatitis  Side: Right  Location: Groin  Is this injury device related?: No   Wound Image    Dressing Appearance Open to air;No dressing   Drainage Amount None   Red (%), Wound Tissue Color 100 %   Periwound Area Intact   Care Cleansed with:;Wound cleanser   Dressing   (applied Triad paste, open to air)        Wound 06/04/25 1030 Moisture associated dermatitis Left Groin   Date First Assessed/Time First Assessed: 06/04/25 1030   Present on Original Admission: Yes  Primary Wound Type: Moisture associated dermatitis  Side: Left  Location: Groin   Wound Image    Dressing Appearance Open to air   Drainage Amount None   Appearance Pink   Red (%), Wound Tissue Color 100 %   Periwound Area Moist   Care Cleansed with:;Wound cleanser   Dressing   (applied Triad paste, open to air)        Wound 06/04/25 1030 Moisture associated dermatitis Sacral spine   Date First Assessed/Time First Assessed: 06/04/25 1030   Present on Original Admission: Yes  Primary Wound Type: (c) Moisture associated dermatitis  Location: (c) Sacral spine   Wound Image      Dressing Appearance Open to air   Drainage Amount None   Drainage Characteristics/Odor No odor   Red (%), Wound Tissue Color 100 %   Periwound Area Moist   Care Wound cleanser   Dressing Applied  (applied Triad paste, open to air)        Wound 06/09/25 Pressure Injury Nose   Date First Assessed: 06/09/25   Present on Original Admission: Yes  Primary Wound Type: Pressure Injury  Location: Nose    Wound Image    Dressing Appearance Open to air;Dry   Drainage Amount None   Appearance Pink;Maroon   Red (%), Wound Tissue Color 100 %   Periwound Area Intact;Dry   Wound Length (cm) 0 cm   Wound Width (cm) 0 cm   Wound Depth (cm) 0 cm   Wound Volume (cm^3) 0 cm^3   Wound Surface Area (cm^2) 0 cm^2   Care Cleansed with:;Wound cleanser   Skin Interventions   Device Skin Pressure Protection pressure points protected;skin-to-device areas padded;tubing/devices free from skin contact   Pressure Reduction Devices pressure-redistributing mattress utilized;specialty bed utilized   Skin Protection hydrocolloids used   Safety   Safety Precautions emergency equipment at bedside   Safety Management   Safety Promotion/Fall Prevention assistive device/personal item within reach;bed alarm set;lighting adjusted;pulse ox   Patient Rounds bed in low position;bed wheels locked;call light in patient/parent reach;clutter free environment maintained;ID band on;placement of personal items at bedside;toileting offered;visualized patient   Positioning   Body Position position changed independently   Head of Bed (HOB) Positioning HOB at 45 degrees   Positioning/Transfer Devices pillows;in use

## 2025-06-13 NOTE — CARE UPDATE
06/12/25 2006   Patient Assessment/Suction   Level of Consciousness (AVPU) alert   Respiratory Effort Normal;Unlabored   Expansion/Accessory Muscles/Retractions expansion symmetric;no retractions;no use of accessory muscles   All Lung Fields Breath Sounds diminished   Rhythm/Pattern, Respiratory depth regular;pattern regular;unlabored   Cough Frequency infrequent   Cough Type nonproductive   PRE-TX-O2   Device (Oxygen Therapy) nasal cannula with humidification   $ Is the patient on Low Flow Oxygen? Yes   Flow (L/min) (Oxygen Therapy) 1   Oxygen Concentration (%) 24   SpO2 (!) 94 %   Pulse Oximetry Type Continuous   $ Pulse Oximetry - Multiple Charge Pulse Oximetry - Multiple   Pulse 94   Resp 20   Aerosol Therapy   $ Aerosol Therapy Charges Aerosol Treatment   Daily Review of Necessity (SVN) completed   Respiratory Treatment Status (SVN) given   Treatment Route (SVN) mask   Patient Position HOB elevated   Post Treatment Assessment (SVN) breath sounds unchanged   Signs of Intolerance (SVN) none   Breath Sounds Post-Respiratory Treatment   Throughout All Fields Post-Treatment All Fields   Throughout All Fields Post-Treatment no change   Post-treatment Heart Rate (beats/min) 91   Post-treatment Resp Rate (breaths/min) 18   Vibratory PEP Therapy   $ Vibratory PEP Charges Acapella Therapy   $ Vibratory PEP Tech Time Charges 15 min   Daily Review of Necessity (PEP Therapy) completed   Type (PEP Therapy) vibratory/oscillatory   Device (PEP Therapy) Acapella low (blue)   Route (PEP Therapy) instruction provided, follow-up;mouthpiece;proper technique demonstrated   Breaths per Cycle (PEP Therapy) 10   Cycles (PEP Therapy) 1   Settings (PEP Therapy) PEP 5   Patient Position (PEP Therapy) HOB elevated   Post Treatment Assessment (PEP) breath sounds unchanged   Signs of Intolerance (PEP Therapy) none   General Safety Checklist   Safety Promotion/Fall Prevention side rails raised   Respiratory Interventions   Cough And Deep  Breathing done independently per patient   Airway/Ventilation Management airway patency maintained;calming measures promoted;humidification applied;pulmonary hygiene promoted   Airway/Ventilation Support comfort measures provided;cough relief provided;dyspnea relief promoted;humidification applied;pulmonary hygiene promoted   Breathing Techniques/Airway Clearance deep/controlled cough encouraged;diaphragmatic breathing promoted   Preset CPAP/BiPAP Settings   Mode Of Delivery home unit;standby   CPAP/BIPAP charged w/in last 24 h NO   Education   $ Education Bronchodilator;Oxygen;Other (see comment)  (CPAP AND CPT)   Oxygen Care Plan   Oxygen Care Plan Per Protocol   SPO2 Goal (%) MD order   Rationale SpO2 is <MD Goal   Bronchodilator Care Plan   Bronchodilator Care Plan Aerosol   Aerosol Meds w/ frequency Sodium Chloride 3% BID;Other  (LEVALBUTEROL 1.2495 Q4)   DPI Meds w/ frequency Other  (SPIRIVA 2.5 MCG 2 PUFFS DAILY)   Airway Clearance Care Plan   Airway Clearance Care Plan CPT   Frequency TID   Rationale Rhonchi

## 2025-06-13 NOTE — PT/OT/SLP PROGRESS
Physical Therapy Treatment    Patient Name:  Jordin Allen Jr.   MRN:  [id number]    Recommendations:     Discharge Recommendations: Moderate Intensity Therapy  Discharge Equipment Recommendations: other (see comments) (TBD)  Barriers to discharge: Inaccessible home    Assessment:     Jordin Allen Jr. is a 79 y.o. male admitted with a medical diagnosis of Acute on chronic respiratory failure with hypoxia.  He presents with the following impairments/functional limitations: weakness, impaired endurance, gait instability, impaired functional mobility, decreased lower extremity function Tolerated tx well. Patient very motivated and increased OOB time. .    Rehab Prognosis: Good; patient would benefit from acute skilled PT services to address these deficits and reach maximum level of function.    Recent Surgery: * No surgery found *      Plan:     During this hospitalization, patient to be seen 5 x/week to address the identified rehab impairments via gait training, therapeutic activities, therapeutic exercises, neuromuscular re-education and progress toward the following goals:    Plan of Care Expires:       Subjective     Chief Complaint: NA  Patient/Family Comments/goals: To get up more and walk more.   Pain/Comfort:  Pain Rating 1: 0/10      Objective:     Communicated with nurse prior to session.  Patient found supine with bed alarm, telemetry, blood pressure cuff, pulse ox (continuous), peripheral IV, oxygen upon PT entry to room.     General Precautions: Standard, fall, aspiration  Orthopedic Precautions: spinal precautions  Braces: TLSO  Respiratory Status: Nasal cannula, flow 2 L/min     Functional Mobility:  Bed Mobility:     Supine to Sit: minimum assistance  Sit to Supine: minimum assistance  Transfers:     Bed to Chair: minimum assistance with  hand-held assist  using  Stand Pivot      AM-PAC 6 CLICK MOBILITY  Turning over in bed (including adjusting bedclothes, sheets and blankets)?: 3  Sitting down on  and standing up from a chair with arms (e.g., wheelchair, bedside commode, etc.): 3  Moving from lying on back to sitting on the side of the bed?: 3  Moving to and from a bed to a chair (including a wheelchair)?: 2  Need to walk in hospital room?: 1  Climbing 3-5 steps with a railing?: 1  Basic Mobility Total Score: 13       Treatment & Education:  Patient functional mobility as per above. Patient sat EOB for 5 minutes and performed Seated ther ex 2 x 20 resp LAQ, hip flexion, AP and ABD. Transfers to bed side chair with min a. Patient sat up in chair with TLSO donned.       Patient left up in chair with all lines intact, call button in reach, and nurse notified..    GOALS:   Multidisciplinary Problems       Physical Therapy Goals          Problem: Physical Therapy    Goal Priority Disciplines Outcome Interventions   Physical Therapy Goal     PT, PT/OT Progressing    Description: Goals to be met by: DC     Patient will increase functional independence with mobility by performin. Supine to sit with [place]  2. Sit to supine with Hawkins  3. Sit to stand transfer with Supervision with RW  4. Gait  x 150 feet with Contact Guard Assistance using Rolling Walker.                          DME Justifications:  No DME recommended requiring DME justifications    Time Tracking:     PT Received On: 25  PT Start Time: 904     PT Stop Time: 928  PT Total Time (min): 24 min     Billable Minutes: Therapeutic Activity 15 and Therapeutic Exercise 9    Treatment Type: Treatment  PT/PTA: PT     Number of PTA visits since last PT visit: 0     2025

## 2025-06-13 NOTE — PROGRESS NOTES
06/12/25 0830   WOCN Assessment   WOCN Total Time (mins) 30   Visit Date 06/12/25   Visit Time 0830   Consult Type Follow Up   WOCN Speciality Wound;Continence   Wound moisture   Continence Type Urinary;Fecal   Intervention assessed;coordination of care;team conference;orders   Teaching on-going   Marked no   Skin Interventions   Device Skin Pressure Protection pressure points protected;skin-to-device areas padded;tubing/devices free from skin contact   Pressure Reduction Devices pressure-redistributing mattress utilized;specialty bed utilized   Skin Protection hydrocolloids used   Positioning   Body Position position changed independently   Head of Bed (HOB) Positioning HOB at 45 degrees   Positioning/Transfer Devices pillows;in use   Pressure Injury Prevention    Check Moisture Management Pad Done   Sacral Foam Dressing Patient incontinent, barrier cream applied   Heel protection technique Pillow   Heel preventative measures Apply   Check Medical Devices Done        Wound 05/19/25 0233 Moisture associated dermatitis Left dorsal Foot   Date First Assessed/Time First Assessed: 05/19/25 0233   Present on Original Admission: Yes  Primary Wound Type: Moisture associated dermatitis  Side: Left  Orientation: dorsal  Location: Foot  Is this injury device related?: No   Wound Image      Dressing Appearance Dry   Drainage Amount None   Drainage Characteristics/Odor No odor   Appearance Dry   Periwound Area Intact   Care Wound cleanser   Dressing Applied  (Lac-hydrin 12%)        Wound 06/04/25 1030 Moisture associated dermatitis Right lateral Abdomen   Date First Assessed/Time First Assessed: 06/04/25 1030   Present on Original Admission: Yes  Primary Wound Type: Moisture associated dermatitis  Side: Right  Orientation: lateral  Location: Abdomen   Wound Image    Dressing Appearance Open to air   Drainage Amount None   Red (%), Wound Tissue Color 100 %   Periwound Area Moist   Care Wound cleanser   Dressing   (applied  Triad paste, open to air)        Wound 06/04/25 1030 Moisture associated dermatitis Left lateral Abdomen   Date First Assessed/Time First Assessed: 06/04/25 1030   Present on Original Admission: Yes  Primary Wound Type: Moisture associated dermatitis  Side: Left  Orientation: lateral  Location: Abdomen   Dressing Appearance Open to air   Drainage Amount None   Appearance Moist   Red (%), Wound Tissue Color 100 %   Periwound Area [place]   Care Wound cleanser   Dressing   (applied Triad open to air)        Wound 06/04/25 1030 Moisture associated dermatitis Right Groin   Date First Assessed/Time First Assessed: 06/04/25 1030   Present on Original Admission: Yes  Primary Wound Type: Moisture associated dermatitis  Side: Right  Location: Groin  Is this injury device related?: No   Wound Image    Dressing Appearance Open to air;No dressing   Drainage Amount None   Red (%), Wound Tissue Color 100 %   Periwound Area Intact   Care Cleansed with:;Wound cleanser   Dressing   (applied Triad paste, open to air)        Wound 06/04/25 1030 Moisture associated dermatitis Left Groin   Date First Assessed/Time First Assessed: 06/04/25 1030   Present on Original Admission: Yes  Primary Wound Type: Moisture associated dermatitis  Side: Left  Location: Groin   Wound Image    Dressing Appearance Open to air   Drainage Amount None   Appearance Pink   Red (%), Wound Tissue Color 100 %   Periwound Area Moist   Care Cleansed with:;Wound cleanser   Dressing   (applied Triad paste, open to air)        Wound 06/04/25 1030 Moisture associated dermatitis Sacral spine   Date First Assessed/Time First Assessed: 06/04/25 1030   Present on Original Admission: Yes  Primary Wound Type: (c) Moisture associated dermatitis  Location: (c) Sacral spine   Wound Image      Dressing Appearance Open to air   Drainage Amount None   Drainage Characteristics/Odor No odor   Red (%), Wound Tissue Color 100 %   Periwound Area Moist   Care Wound cleanser   Dressing  Applied  (applied Triad paste, open to air)        Wound 06/09/25 Pressure Injury Nose   Date First Assessed: 06/09/25   Present on Original Admission: Yes  Primary Wound Type: Pressure Injury  Location: Nose   Wound Image    Dressing Appearance Open to air;Dry   Drainage Amount None   Appearance Pink;Maroon   Red (%), Wound Tissue Color 100 %   Periwound Area Intact;Dry   Wound Length (cm) 0 cm   Wound Width (cm) 0 cm   Wound Depth (cm) 0 cm   Wound Volume (cm^3) 0 cm^3   Wound Surface Area (cm^2) 0 cm^2   Care Cleansed with:;Wound cleanser

## 2025-06-13 NOTE — RESPIRATORY THERAPY
Patient had an uneventful night. He was placed on his home CPAP with 2 lpm bled into it at 2220. Patient tolerated all respiratory therapies without any adverse reactions. As of this note he remains asleep with his CPAP in place. Will continue to monitor for the remainder of this shift.

## 2025-06-13 NOTE — PROGRESS NOTES
Nutrition Follow Up Note    General Info/Comments:  Pt continues on heart healthy diet with Boost Plus Chocolate ordered BID. Pt with varied, often poor PO intake earlier this week, now improving %. LBM 6/12/2025. Current wt 194# - decrease of 17# since last week - pt re-weighed multiple times. RD to continue to monitor.    Recommendations:  Recommendation/Intervention:   1. Continue heart healthy diet.   2. Continue Boost Plus BID.   3. Monitor and encourage PO intake.   4. Monitor weight changes and labs.    Goal:  Goals: Pt to consistently meet >50% assessed needs through PO and supplement intake by RD follow up.  Nutrition Goal Status: progressing towards goal    Assessment and Plan:  PES Statement  Nutrition PES Problem: Inadequate protein energy intake  Nutrition PES Etiology: Wound healing  Nutrition PES Signs and Symptoms: Wounds (Inconsistent PO intake)  Nutrition PES Status: Improving    Diet Order:  Current Diet Order: Heart Healthy     Oral Nutrition Supplement: Boost Plus Chocolate BID    Food Allergies:  Food Allergies: NKFA    Spiritual, Cultural Beliefs, Rastafari Preferences that Affect Care:  Spiritual, Cultural Beliefs, Rastafari Practices, Values that Affect Care: no    Principal Problem:  Acute on chronic respiratory failure with hypoxia    Weights:  Wt Readings from Last 3 Encounters:   06/13/25 88 kg (194 lb 0.1 oz)   06/11/25 87.4 kg (192 lb 10.9 oz)   05/23/25 96 kg (211 lb 10.3 oz)       Estimated/Assessed Needs:  Energy Calorie Requirements (kcal): 1814 (25 kcal/kg IBW)  Energy Need Method: Kcal/kg (IBW)  Protein Requirements: 102 g (1.4 g/kg IBW)  Fluid Requirements (mL): 1814    Labs:  Lab Results   Component Value Date/Time     06/13/2025 04:13 AM     02/12/2025 02:24 AM    K 4.0 06/13/2025 04:13 AM    K 3.6 02/12/2025 02:24 AM    EGFRNORACEVR >60 06/13/2025 04:13 AM    EGFRNORACEVR >60.0 02/25/2025 10:33 AM    CALCIUM 8.6 (L) 06/13/2025 04:13 AM    CALCIUM 8.9  02/12/2025 02:24 AM    PHOS 2.1 (L) 06/13/2025 04:13 AM    PHOS 2.6 (L) 02/12/2025 02:24 AM    MG 2.1 06/13/2025 04:13 AM    ALBUMIN 2.0 (L) 06/13/2025 04:13 AM    ALBUMIN 3.0 (L) 02/12/2025 02:24 AM    .6 (H) 05/22/2025 12:22 PM    TRIG 64 02/07/2025 05:01 PM    HGBA1C 5.7 (H) 03/06/2024 09:53 AM       Medications:  Scheduled Meds:   amitriptyline  25 mg Oral QHS    ammonium lactate   Topical (Top) Daily    apixaban  5 mg Oral BID    aspirin  81 mg Oral QAM    atorvastatin  80 mg Oral Daily    diltiaZEM  180 mg Oral Daily    fluticasone propionate  2 spray Each Nostril Daily    guaiFENesin  1,200 mg Oral BID    levalbuterol  1.2495 mg Nebulization Q4H    levETIRAcetam  500 mg Oral BID    mirtazapine  15 mg Oral QHS    pantoprazole  40 mg Oral Daily    piperacillin-tazobactam (Zosyn) IV (PEDS and ADULTS) (extended infusion is not appropriate)  4.5 g Intravenous Q8H    roflumilast  500 mcg Oral Daily    sodium chloride 3%  4 mL Nebulization BID    tiotropium bromide  2 puff Inhalation Daily     Continuous Infusions:  PRN Meds:.  Current Facility-Administered Medications:     acetaminophen, 650 mg, Oral, Q8H PRN    albuterol sulfate, 0.6667 mg, Nebulization, Q6H PRN    calcium carbonate, 500 mg, Oral, TID PRN    hydrOXYzine HCL, 25 mg, Oral, TID PRN    melatonin, 6 mg, Oral, Nightly PRN    methocarbamoL, 500 mg, Oral, TID PRN    naloxone, 0.02 mg, Intravenous, PRN    nitroGLYCERIN, 0.4 mg, Sublingual, Q5 Min PRN    ondansetron, 4 mg, Oral, Q6H PRN    oxyCODONE-acetaminophen, 1 tablet, Oral, Q4H PRN    polyethylene glycol, 17 g, Oral, BID PRN    sodium chloride 0.9%, 10 mL, Intravenous, Q12H PRN    Nutrition Risk:  Nutrition Risk  Level of Risk/Frequency of Follow-up: moderate     Follow Up: Weekly    Monitor and Evaluation:  Monitor and Evaluation  Monitor and Evaluation: Energy intake, Food and beverage intake, Protein intake, Diet order, Food and nutrition knowledge, Weight, Beliefs and attitudes, Electrolyte  and renal panel, Gastrointestinal profile, Glucose/endocrine profile, Inflammatory profile, Lipid profile, Nutrition focused physical findings, Skin

## 2025-06-14 LAB
BACTERIA BLD CULT: NORMAL
BACTERIA BLD CULT: NORMAL

## 2025-06-14 PROCEDURE — 36410 VNPNXR 3YR/> PHY/QHP DX/THER: CPT

## 2025-06-14 PROCEDURE — 25000242 PHARM REV CODE 250 ALT 637 W/ HCPCS

## 2025-06-14 PROCEDURE — 25000003 PHARM REV CODE 250: Performed by: NURSE PRACTITIONER

## 2025-06-14 PROCEDURE — 27000221 HC OXYGEN, UP TO 24 HOURS

## 2025-06-14 PROCEDURE — 63600175 PHARM REV CODE 636 W HCPCS: Performed by: STUDENT IN AN ORGANIZED HEALTH CARE EDUCATION/TRAINING PROGRAM

## 2025-06-14 PROCEDURE — 27000173 HC ACAPELLA DEVICE DH OR DM

## 2025-06-14 PROCEDURE — 25000003 PHARM REV CODE 250: Performed by: STUDENT IN AN ORGANIZED HEALTH CARE EDUCATION/TRAINING PROGRAM

## 2025-06-14 PROCEDURE — 94640 AIRWAY INHALATION TREATMENT: CPT

## 2025-06-14 PROCEDURE — 94761 N-INVAS EAR/PLS OXIMETRY MLT: CPT

## 2025-06-14 PROCEDURE — 25000003 PHARM REV CODE 250

## 2025-06-14 PROCEDURE — C1751 CATH, INF, PER/CENT/MIDLINE: HCPCS

## 2025-06-14 PROCEDURE — 11000001 HC ACUTE MED/SURG PRIVATE ROOM

## 2025-06-14 PROCEDURE — 99900035 HC TECH TIME PER 15 MIN (STAT)

## 2025-06-14 PROCEDURE — 27000207 HC ISOLATION

## 2025-06-14 PROCEDURE — 94664 DEMO&/EVAL PT USE INHALER: CPT

## 2025-06-14 RX ORDER — BENZONATATE 100 MG/1
100 CAPSULE ORAL 3 TIMES DAILY PRN
Status: DISCONTINUED | OUTPATIENT
Start: 2025-06-14 | End: 2025-07-10 | Stop reason: HOSPADM

## 2025-06-14 RX ADMIN — Medication 4 ML: at 09:06

## 2025-06-14 RX ADMIN — ASPIRIN 81 MG: 81 TABLET, COATED ORAL at 08:06

## 2025-06-14 RX ADMIN — LEVALBUTEROL HYDROCHLORIDE 1.25 MG: 0.63 SOLUTION RESPIRATORY (INHALATION) at 12:06

## 2025-06-14 RX ADMIN — ONDANSETRON 4 MG: 4 TABLET, ORALLY DISINTEGRATING ORAL at 09:06

## 2025-06-14 RX ADMIN — Medication: at 08:06

## 2025-06-14 RX ADMIN — APIXABAN 5 MG: 5 TABLET, FILM COATED ORAL at 08:06

## 2025-06-14 RX ADMIN — LEVALBUTEROL HYDROCHLORIDE 1.25 MG: 0.63 SOLUTION RESPIRATORY (INHALATION) at 07:06

## 2025-06-14 RX ADMIN — PIPERACILLIN AND TAZOBACTAM 4.5 G: 4; .5 INJECTION, POWDER, FOR SOLUTION INTRAVENOUS at 09:06

## 2025-06-14 RX ADMIN — ATORVASTATIN CALCIUM 80 MG: 80 TABLET, FILM COATED ORAL at 08:06

## 2025-06-14 RX ADMIN — LEVALBUTEROL HYDROCHLORIDE 1.25 MG: 0.63 SOLUTION RESPIRATORY (INHALATION) at 11:06

## 2025-06-14 RX ADMIN — Medication 4 ML: at 07:06

## 2025-06-14 RX ADMIN — LEVALBUTEROL HYDROCHLORIDE 1.25 MG: 0.63 SOLUTION RESPIRATORY (INHALATION) at 04:06

## 2025-06-14 RX ADMIN — AMITRIPTYLINE HYDROCHLORIDE 25 MG: 25 TABLET, FILM COATED ORAL at 09:06

## 2025-06-14 RX ADMIN — PIPERACILLIN AND TAZOBACTAM 4.5 G: 4; .5 INJECTION, POWDER, FOR SOLUTION INTRAVENOUS at 06:06

## 2025-06-14 RX ADMIN — GUAIFENESIN 1200 MG: 600 TABLET, MULTILAYER, EXTENDED RELEASE ORAL at 08:06

## 2025-06-14 RX ADMIN — LEVETIRACETAM 500 MG: 500 TABLET, FILM COATED ORAL at 08:06

## 2025-06-14 RX ADMIN — POLYETHYLENE GLYCOL 3350 17 G: 17 POWDER, FOR SOLUTION ORAL at 08:06

## 2025-06-14 RX ADMIN — LEVALBUTEROL HYDROCHLORIDE 1.25 MG: 0.63 SOLUTION RESPIRATORY (INHALATION) at 09:06

## 2025-06-14 RX ADMIN — FLUTICASONE PROPIONATE 100 MCG: 50 SPRAY, METERED NASAL at 08:06

## 2025-06-14 RX ADMIN — TIOTROPIUM BROMIDE INHALATION SPRAY 2 PUFF: 3.12 SPRAY, METERED RESPIRATORY (INHALATION) at 09:06

## 2025-06-14 RX ADMIN — LEVALBUTEROL HYDROCHLORIDE 1.25 MG: 0.63 SOLUTION RESPIRATORY (INHALATION) at 01:06

## 2025-06-14 RX ADMIN — LEVETIRACETAM 500 MG: 500 TABLET, FILM COATED ORAL at 09:06

## 2025-06-14 RX ADMIN — APIXABAN 5 MG: 5 TABLET, FILM COATED ORAL at 09:06

## 2025-06-14 RX ADMIN — GUAIFENESIN 1200 MG: 600 TABLET, MULTILAYER, EXTENDED RELEASE ORAL at 09:06

## 2025-06-14 RX ADMIN — PIPERACILLIN AND TAZOBACTAM 4.5 G: 4; .5 INJECTION, POWDER, FOR SOLUTION INTRAVENOUS at 01:06

## 2025-06-14 RX ADMIN — MELATONIN TAB 3 MG 6 MG: 3 TAB at 09:06

## 2025-06-14 RX ADMIN — BENZONATATE 100 MG: 100 CAPSULE ORAL at 05:06

## 2025-06-14 RX ADMIN — DILTIAZEM HYDROCHLORIDE 180 MG: 180 CAPSULE, EXTENDED RELEASE ORAL at 08:06

## 2025-06-14 RX ADMIN — ROFLUMILAST 500 MCG: 500 TABLET ORAL at 08:06

## 2025-06-14 RX ADMIN — PANTOPRAZOLE SODIUM 40 MG: 40 TABLET, DELAYED RELEASE ORAL at 08:06

## 2025-06-14 RX ADMIN — MIRTAZAPINE 15 MG: 15 TABLET, FILM COATED ORAL at 09:06

## 2025-06-14 RX ADMIN — LEVALBUTEROL HYDROCHLORIDE 1.25 MG: 0.63 SOLUTION RESPIRATORY (INHALATION) at 03:06

## 2025-06-14 NOTE — PROGRESS NOTES
[facility] Medicine   Progress Note  Room: 212/212   Patient Name: Jordin Allen Jr.  MRN: [id number]  Admit Date: 6/3/2025   Length of Stay:  LOS: 11 days   Attending Physician: Bernardino Guthrie MD  Nurse Practitioner: Erinn Suggs NP    Date of Service: 06/14/2025    Principal Problem:    Acute on chronic respiratory failure with hypoxia    Brief HPI:     Jordin Allen Jr. is a 79 y.o. 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  He [place] to [facility] [place] on [place]/22/2025 with complaints of shortness of breath and left foot redness.  Admitted to hospital medicine team for left foot cellulitis and acute hypoxic respiratory failure secondary to pneumonia/COPD exacerbation. CTA chest negative for PE. Emphysematous change with superimposed edema. Nodular focus within the anterior aspect of left upper lobe.  Started on nebs, prednisone, and empiric vanc/cefepime and doxycycline.  Respiratory panel positive for parainfluenza virus.  Course further complicated by sinus tach with PACs/18, for which he was started on diltiazem.  Blood cultures positive for staph, suspected to be contaminant.  Repeat blood cultures returned negative.  He will switch to Augmentin and doxy.  Ongoing episodes of SVT, thought to be due to underlying hypoxia.  He was able to be weaned down to high-flow nasal cannula 15 L.  Discharged to [facility] on 06/03/2025 for rehab, wound care, and O2 weaning.     6/9-6/11-sent out to [facility] for concerns of [place]. Workup revealed findings concerning for pneumonia on CT. Started on empiric vanc and zosyn. Also started on 5 day course of prednisone for COPD exacerbation.    Interval History    No acute events overnight   Vital signs stable, afebrile   Continue 10 day course of Zosyn  O2 requirements are stable, on 1 L nasal  cannula with O2 sats 97%      Subjective:     Review of Systems   Constitutional:  Positive for malaise/fatigue.   Respiratory:  Positive for cough, sputum production and shortness of breath.    Cardiovascular:  Positive for chest pain. Negative for leg swelling.   Gastrointestinal:  Negative for heartburn, nausea and vomiting.   Genitourinary:  Negative for dysuria and urgency.   Musculoskeletal:  Positive for joint pain (R shoulder). Negative for myalgias.   Neurological:  Positive for weakness. Negative for dizziness.   Psychiatric/Behavioral:  Negative for depression. The patient does not have insomnia.          Objective:     Vital Sign Range  Temp:  [97.4 °F (36.3 °C)-98.3 °F (36.8 °C)]   Pulse:  [70-95]   Resp:  [13-32]   BP: (129-168)/(74-86)   SpO2:  [93 %-98 %]     Body mass index is 28.6 kg/m².  Oxygen Concentration (%):  [1-24] 24        Intake/Output Summary (Last 24 hours) at 6/14/2025 0707  Last data filed at 6/14/2025 0424  Gross per 24 hour   Intake 1474 ml   Output 1025 ml   Net 449 ml       Physical Exam  Constitutional:       Appearance: He is ill-appearing.   HENT:      Head: Normocephalic and atraumatic.      Mouth/Throat:      Mouth: Mucous membranes are moist.      Pharynx: Oropharynx is clear.   Eyes:      Extraocular Movements: Extraocular movements intact.      Pupils: Pupils are equal, round, and reactive to light.   Cardiovascular:      Rate and Rhythm: Normal rate. Rhythm irregular.   Pulmonary:      Breath sounds: Wheezing and rhonchi present.   Abdominal:      General: Bowel sounds are normal.      Palpations: Abdomen is soft.   Musculoskeletal:      Right lower leg: Edema present.      Left lower leg: Edema present.   Skin:     General: Skin is warm and dry.   Neurological:      Mental Status: He is alert and oriented to person, place, and time. Mental status is at baseline.   Psychiatric:         Mood and Affect: Affect is flat.         Wounds       Wound 05/19/25 0233 Moisture  associated dermatitis Left dorsal Foot (Active)   05/19/25 0233 Foot   Present on Original Admission: Y   Primary Wound Type: Moisture ass   Side: Left   Orientation: dorsal        Wound 05/19/25 0231 Moisture associated dermatitis Left lateral Foot (Active)   05/19/25 0231 Foot   Present on Original Admission: Y   Primary Wound Type: Moisture ass   Side: Left   Orientation: lateral        Wound 05/19/25 0231 Pressure Injury Right anterior Ankle (Active)   05/19/25 0231 Ankle   Present on Original Admission: Y   Primary Wound Type: Pressure inj   Side: Right   Orientation: anterior        Wound 05/19/25 0233 Moisture associated dermatitis Left anterior Foot (Active)   05/19/25 0233 Foot   Present on Original Admission:    Primary Wound Type: Moisture ass   Side: Left   Orientation: anterior        Wound 06/04/25 1030 Moisture associated dermatitis Right lateral Abdomen (Active)   06/04/25 1030 Abdomen   Present on Original Admission: Y   Primary Wound Type: Moisture ass   Side: Right   Orientation: lateral        Wound 06/04/25 1030 Moisture associated dermatitis Left lateral Abdomen (Active)   06/04/25 1030 Abdomen   Present on Original Admission: Y   Primary Wound Type: Moisture ass   Side: Left   Orientation: lateral        Wound 06/04/25 1030 Moisture associated dermatitis Right Groin (Active)   06/04/25 1030 Groin   Present on Original Admission: Y   Primary Wound Type: Moisture ass   Side: Right        Wound 06/04/25 1030 Moisture associated dermatitis Left Groin (Active)   06/04/25 1030 Groin   Present on Original Admission: Y   Primary Wound Type: Moisture ass   Side: Left        Wound 06/04/25 1030 Moisture associated dermatitis Sacral spine (Active)   06/04/25 1030 Sacral spine   Present on Original Admission: Y   Primary Wound Type: Moisture ass         Wounds consistently followed by [facility]     Labs:  Recent Labs   Lab 06/10/25  0330 06/11/25  0441 06/13/25  0413   WBC 12.20  "11.82 11.92   HGB 11.6* 10.9* 10.0*   HCT 36.6* 33.3* 31.1*   * 143* 138*     Recent Labs   Lab 06/10/25  0330 06/11/25 0442 06/13/25  0413   * 135* 143   K 4.0 3.1* 4.0   CL 92* 94* 102   CO2 29 30* 31*   BUN 34* 34* 37*   CREATININE 0.9 0.9 1.1   * 162* 167*   CALCIUM 9.0 8.3* 8.6*   MG 2.1 2.1 2.1   PHOS 2.6* 3.2 2.1*     Recent Labs   Lab 06/10/25  0330 06/11/25 0442 06/13/25  0413   ALKPHOS 143 116 130   * 157* 111*   * 121* 43   ALBUMIN 2.2* 1.9* 2.0*   PROT 6.2 5.1* 5.0*   BILITOT 0.6 0.5 0.3       No results for input(s): "POCTGLUCOSE" in the last 72 hours.      Meds Scheduled:   amitriptyline  25 mg Oral QHS    ammonium lactate   Topical (Top) Daily    apixaban  5 mg Oral BID    aspirin  81 mg Oral QAM    atorvastatin  80 mg Oral Daily    diltiaZEM  180 mg Oral Daily    fluticasone propionate  2 spray Each Nostril Daily    guaiFENesin  1,200 mg Oral BID    levalbuterol  1.2495 mg Nebulization Q4H    levETIRAcetam  500 mg Oral BID    mirtazapine  15 mg Oral QHS    pantoprazole  40 mg Oral Daily    piperacillin-tazobactam (Zosyn) IV (PEDS and ADULTS) (extended infusion is not appropriate)  4.5 g Intravenous Q8H    roflumilast  500 mcg Oral Daily    sodium chloride 3%  4 mL Nebulization BID    tiotropium bromide  2 puff Inhalation Daily       Current Inpatient Problem List:  Active Hospital Problems    Diagnosis  POA    *Acute on chronic respiratory failure with hypoxia [J96.21]  Yes    Parainfluenza virus infection [B34.8]  Yes    Pneumonia of right lung due to infectious organism [J18.9]  Yes    Cellulitis [L03.90]  Yes    TIA (transient ischemic attack) [G45.9]  Yes     ASA and statin      Metastasis to brain [C79.31]  Yes    Adenocarcinoma, lung, 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       CAD (coronary artery " disease) [I25.10]  Yes     Chronic     -[facility] (10/31/13) for stemi: pLAD 100%, Cx with Li's, mRCA 40%, LVEF 55%,. LVEDP 15   -Xience 3.0 x 33 mm to pLAD, post dilated with 3.5 NC and 4.0 NC.   -TTE (8/14/13): LVEF 55%, grade I diastolic dysfunction      HTN (hypertension), benign [I10]  Yes    SHOLA (obstructive sleep apnea) [G47.33]  Yes     CPAP at night      GERD (gastroesophageal reflux disease) [K21.9]  Yes     Continue PPI        Resolved Hospital Problems   No resolved problems to display.         Assessment / Plan:     Acute respiratory failure with hypoxia  COPD with acute exacerbation  Parainfluenza virus infection  Pneumonia   On Symbicort Spiriva and Daliresp at home. Follows up with pulmonology outpatient.   Completed 7 day course of Augmentin and doxycycline on 06/01  Continue Daliresp 500mcg daily  Continue Xopenex p.r.n.   Continue weaning high-flow nasal cannula  Continue guaifenesin 1200 mg b.i.d.  Continue 10 day course of Zosyn  O2 requirements are stable, on 1 L nasal cannula with O2 sats 97%    Chest pain, new   Resolved   Evaluated by cardiology while in the hospital  Will need outpatient follow up with Dr. Ba with Cardiology     Cellulitis   Wound followed and managed by consistent, contracted [facility] NP  Completed 7 day course of doxy and Augmentin  Will need follow up with Podiatry after discharge     Restless leg syndrome   Continue amitriptyline 25 mg nightly     Adenocarcinoma, lung, left   Metastasis to brain  Completed treatment with chemo/XRT. Brain XRT for lesionsx2. off immunotherapy since 12/24  suspect the LLL area was consolidation of prior RXT changes and not malignancy and is nearly resolved.  CT on presentation with new SANJAY nodule, need op follow up with pulm  Continue prophylactic Keppra 500 mg b.i.d.    Right shoulder pain  Having some shoulder pain to his right shoulder, which seems to be some muscle spasms.    Started Robaxin 500 mg q.i.d. p.r.n. on 6/6    Compression  fracture of L1 vertebrae with routine healing   TLSO brace when out of bed  F/u with Dr. Hopkins     Dyslipidemia  TIA  CAD   Continue aspirin   Continue atorvastatin 80 mg daily     Tachycardia   Episodes of SVT while at the hospital   Evaluated by Cardiology   Likely worsened by underlying hypoxia   Supplemental O2   continue diltiazem 24 hour capsule 180 mg daily per cardiology recommendations     SHOLA   Continue CPAP nightly     GERD  Continue PPI daily     Left lateral foot wound   Right anterior ankle wound   Traumatic left dorsal foot wound   Left anterior foot venous ulcer   Skin tear right distal arm   Pressure injury nose  Wound followed and managed by consistent, contracted [facility] NP    Advance Care Planning, 06/05/2025  This was a voluntary visit and the option to decline counseling was given  Length of discussion:  16 minutes  Life limiting problem:  Respiratory failure, adenocarcinoma  Topics discussed:  Code status  Outcome of discussion:  Patient would like to remain a full code.  In the event that he is not able to make his own decisions, he would like to defer decision making to his daughter  Decision Maker:Jordin Allen     Psychiatric Assessment  Patient Health Questionnaire-9 (PHQ-9)    Date:  06/05/2025  Total Score: 15  Screening is Positive   Diagnosis and Treatment Plan:  Moderate MDD   Continue amitriptyline 25 mg nightly   Increasing mirtazapine to 15 mg nightly, which will hopefully help with his appetite as well       Diet:  Cardiac   GI Ppx:  PPI   DVT Ppx:  Enoxaparin     Lines:  PIV   Drains:  None   Airways:n/a   Wounds:  Multiple    Goals of Care:   Restorative,  Treat infection  Optimize nutrition  Wound healing  Muscle strengthening  Restoration of ADL's  Improve mobility    Anticipated Disposition:    Anticipated d/c 6/24 IPR vs home    Follow up appointments:   Future Appointments   Date Time Provider Department Center   6/24/2025  7:15 AM [facility] OIC-MRI2 [facility] MRI IC Imaging  Ctr   6/24/2025  9:30 AM Steven Campos MD [facility] NEUROSC [place]   6/24/2025  2:00 PM Bienvenido Henson MD [facility] RADONC3 [place]   7/1/2025 10:30 AM [facility] OIC-XRAY NOM XRAY IC Imaging Ctr   7/1/2025 11:30 AM Bolivar Sr PA [facility] NEUROS8 [facility]   7/8/2025  9:15 AM MEEKS, VISUAL FIELDS [facility] OPHTHAL [facility]   7/8/2025 10:00 AM Cara Looney MD [facility] OPHTHAL [facility]   7/15/2025 10:00 AM Garrett Lloyd DPM [facility] POD [facility] Ort   7/23/2025 10:15 AM CV [facility] ECHO [facility]IA [place]   8/15/2025  9:20 AM Bienvenido Ward MD [facility] PULMON [place]   8/27/2025  9:00 AM Guido Trejo MD NOM ENT [facility]   9/8/2025 11:10 AM Yan Palmer MD [facility] DERM [facility]         Erinn Suggs, ORACIO  Hospital Medicine [facility]    I have spent 53 minutes reviewing patient records, examining, and  of the patient/ patient's family with greater than 50% of the time spent with direct patient care and coordination.

## 2025-06-14 NOTE — NURSING
PT alert and oriented. No events overnight, NAD noted. SR on tele. CPAP worn with good sats, otherwise on 2L NC. PT slept well overnight, PRN melatonin given with good effect. Pt voided per urinal, good output. BM this shift. Safety maintained. Bed locked in lowest position, call light within reach. All needs met.

## 2025-06-14 NOTE — CARE UPDATE
06/14/25 0942   Patient Assessment/Suction   Level of Consciousness (AVPU) alert   Respiratory Effort Unlabored   Expansion/Accessory Muscles/Retractions no retractions;no use of accessory muscles   All Lung Fields Breath Sounds diminished   Rhythm/Pattern, Respiratory depth regular;pattern regular   Cough Frequency infrequent   Cough Type good;loose;nonproductive   PRE-TX-O2   Device (Oxygen Therapy) nasal cannula with humidification   $ Is the patient on Low Flow Oxygen? Yes   Flow (L/min) (Oxygen Therapy) 1   SpO2 98 %   Pulse Oximetry Type Continuous   $ Pulse Oximetry - Multiple Charge Pulse Oximetry - Multiple   Pulse 74   Resp 20   Aerosol Therapy   $ Aerosol Therapy Charges Aerosol Treatment   Respiratory Treatment Status (SVN) given   Treatment Route (SVN) mask;air   Patient Position HOB elevated   Post Treatment Assessment (SVN) breath sounds unchanged   Signs of Intolerance (SVN) none   Breath Sounds Post-Respiratory Treatment   Post-treatment Heart Rate (beats/min) 72   Post-treatment Resp Rate (breaths/min) 20   Vibratory PEP Therapy   $ Vibratory PEP Charges Acapella Therapy   $ Vibratory PEP Tech Time Charges 15 min   Type (PEP Therapy) vibratory/oscillatory   Device (PEP Therapy) Acapella low (blue)   Route (PEP Therapy) mouthpiece   Breaths per Cycle (PEP Therapy) 10   Cycles (PEP Therapy) 1   Patient Position (PEP Therapy) HOB elevated   Post Treatment Assessment (PEP) breath sounds unchanged   Signs of Intolerance (PEP Therapy) none

## 2025-06-14 NOTE — PLAN OF CARE
Problem: Pneumonia  Goal: Fluid Balance  Outcome: Progressing  Goal: Resolution of Infection Signs and Symptoms  Outcome: Progressing  Goal: Effective Oxygenation and Ventilation  Outcome: Progressing     Problem: Wound  Goal: Optimal Coping  Outcome: Progressing

## 2025-06-14 NOTE — CARE UPDATE
06/13/25 2105   Patient Assessment/Suction   Level of Consciousness (AVPU) alert   Respiratory Effort Normal;Unlabored   Expansion/Accessory Muscles/Retractions expansion symmetric;no retractions;no use of accessory muscles   All Lung Fields Breath Sounds diminished   Rhythm/Pattern, Respiratory depth regular;pattern regular;unlabored   Cough Frequency infrequent   Cough Type good;nonproductive   PRE-TX-O2   Device (Oxygen Therapy) nasal cannula with humidification   $ Is the patient on Low Flow Oxygen? Yes   Oxygen Concentration (%) 2   Oxygen Analyzed Concentration (%) 27 %   SpO2 95 %   Pulse Oximetry Type Continuous   $ Pulse Oximetry - Multiple Charge Pulse Oximetry - Multiple   Oximetry Probe Status Assessed;Intact   Pulse 90   Resp 20   Positioning HOB elevated 30 degrees   Aerosol Therapy   $ Aerosol Therapy Charges Aerosol Treatment   Daily Review of Necessity (SVN) completed   Respiratory Treatment Status (SVN) given   Treatment Route (SVN) mask   Patient Position semi-Tapia's   Post Treatment Assessment (SVN) breath sounds unchanged;increased aeration   Signs of Intolerance (SVN) none   Breath Sounds Post-Respiratory Treatment   Throughout All Fields Post-Treatment All Fields   Throughout All Fields Post-Treatment no change   Post-treatment Heart Rate (beats/min) 92   Post-treatment Resp Rate (breaths/min) 18   Vibratory PEP Therapy   $ Vibratory PEP Charges Acapella Therapy   $ Vibratory PEP Equipment Blue Acapella - equipment   $ Vibratory PEP Tech Time Charges 15 min   Daily Review of Necessity (PEP Therapy) completed   Type (PEP Therapy) vibratory/oscillatory   Device (PEP Therapy) Acapella low (blue)   Route (PEP Therapy) mouthpiece   Breaths per Cycle (PEP Therapy) 10   Cycles (PEP Therapy) 1   Settings (PEP Therapy) PEP 5   Patient Position (PEP Therapy) semi-Tapia's   Post Treatment Assessment (PEP) breath sounds unchanged   Airway Safety   Is Ambu Bag and Mask with Patient? Yes, Adult Ambu  Bag and Mask   O2  at bedside? No   Suction set is at the bedside? Yes   Communication board is with the patient? No   Respiratory Interventions   Cough And Deep Breathing done independently per patient   Preset CPAP/BiPAP Settings   Mode Of Delivery home unit;standby   CPAP/BIPAP charged w/in last 24 h NO   Respiratory Evaluation   $ Care Plan Tech Time 15 min

## 2025-06-14 NOTE — NURSING
No issues this shift. Rm bath and linen change well. Continues to reposition self with verbal triggers.     Eating well. BM X 1 after prn Miralax provided. Continent of B&B.     VSS. Afebrile. O2 per NC. C/o sporadic cough that causes some distress at times. Erinn NP placed new order for Tessalon Pearls.     Remains in droplet isolation.

## 2025-06-14 NOTE — PROGRESS NOTES
Pt had a coughing spell and had difficulty catching his breath with SpO2 89%.  Increased O2 to 6 lpm, will wean as tolerated.

## 2025-06-15 PROCEDURE — 27000173 HC ACAPELLA DEVICE DH OR DM

## 2025-06-15 PROCEDURE — 27000221 HC OXYGEN, UP TO 24 HOURS

## 2025-06-15 PROCEDURE — 99900035 HC TECH TIME PER 15 MIN (STAT)

## 2025-06-15 PROCEDURE — 94640 AIRWAY INHALATION TREATMENT: CPT

## 2025-06-15 PROCEDURE — 94761 N-INVAS EAR/PLS OXIMETRY MLT: CPT

## 2025-06-15 PROCEDURE — 11000001 HC ACUTE MED/SURG PRIVATE ROOM

## 2025-06-15 PROCEDURE — 25000003 PHARM REV CODE 250: Performed by: STUDENT IN AN ORGANIZED HEALTH CARE EDUCATION/TRAINING PROGRAM

## 2025-06-15 PROCEDURE — 63600175 PHARM REV CODE 636 W HCPCS: Performed by: STUDENT IN AN ORGANIZED HEALTH CARE EDUCATION/TRAINING PROGRAM

## 2025-06-15 PROCEDURE — 27000207 HC ISOLATION

## 2025-06-15 PROCEDURE — 25000003 PHARM REV CODE 250

## 2025-06-15 PROCEDURE — 94664 DEMO&/EVAL PT USE INHALER: CPT

## 2025-06-15 PROCEDURE — 25000242 PHARM REV CODE 250 ALT 637 W/ HCPCS

## 2025-06-15 RX ADMIN — LEVALBUTEROL HYDROCHLORIDE 1.25 MG: 0.63 SOLUTION RESPIRATORY (INHALATION) at 03:06

## 2025-06-15 RX ADMIN — GUAIFENESIN 1200 MG: 600 TABLET, MULTILAYER, EXTENDED RELEASE ORAL at 08:06

## 2025-06-15 RX ADMIN — LEVETIRACETAM 500 MG: 500 TABLET, FILM COATED ORAL at 09:06

## 2025-06-15 RX ADMIN — Medication 4 ML: at 07:06

## 2025-06-15 RX ADMIN — ATORVASTATIN CALCIUM 80 MG: 80 TABLET, FILM COATED ORAL at 08:06

## 2025-06-15 RX ADMIN — MIRTAZAPINE 15 MG: 15 TABLET, FILM COATED ORAL at 09:06

## 2025-06-15 RX ADMIN — GUAIFENESIN 1200 MG: 600 TABLET, MULTILAYER, EXTENDED RELEASE ORAL at 09:06

## 2025-06-15 RX ADMIN — ASPIRIN 81 MG: 81 TABLET, COATED ORAL at 08:06

## 2025-06-15 RX ADMIN — AMITRIPTYLINE HYDROCHLORIDE 25 MG: 25 TABLET, FILM COATED ORAL at 09:06

## 2025-06-15 RX ADMIN — DILTIAZEM HYDROCHLORIDE 180 MG: 180 CAPSULE, EXTENDED RELEASE ORAL at 08:06

## 2025-06-15 RX ADMIN — TIOTROPIUM BROMIDE INHALATION SPRAY 2 PUFF: 3.12 SPRAY, METERED RESPIRATORY (INHALATION) at 08:06

## 2025-06-15 RX ADMIN — ONDANSETRON 4 MG: 4 TABLET, ORALLY DISINTEGRATING ORAL at 09:06

## 2025-06-15 RX ADMIN — Medication: at 08:06

## 2025-06-15 RX ADMIN — PIPERACILLIN AND TAZOBACTAM 4.5 G: 4; .5 INJECTION, POWDER, FOR SOLUTION INTRAVENOUS at 05:06

## 2025-06-15 RX ADMIN — PIPERACILLIN AND TAZOBACTAM 4.5 G: 4; .5 INJECTION, POWDER, FOR SOLUTION INTRAVENOUS at 01:06

## 2025-06-15 RX ADMIN — FLUTICASONE PROPIONATE 100 MCG: 50 SPRAY, METERED NASAL at 08:06

## 2025-06-15 RX ADMIN — PANTOPRAZOLE SODIUM 40 MG: 40 TABLET, DELAYED RELEASE ORAL at 08:06

## 2025-06-15 RX ADMIN — APIXABAN 5 MG: 5 TABLET, FILM COATED ORAL at 09:06

## 2025-06-15 RX ADMIN — ONDANSETRON 4 MG: 4 TABLET, ORALLY DISINTEGRATING ORAL at 08:06

## 2025-06-15 RX ADMIN — ROFLUMILAST 500 MCG: 500 TABLET ORAL at 08:06

## 2025-06-15 RX ADMIN — LEVETIRACETAM 500 MG: 500 TABLET, FILM COATED ORAL at 08:06

## 2025-06-15 RX ADMIN — LEVALBUTEROL HYDROCHLORIDE 1.25 MG: 0.63 SOLUTION RESPIRATORY (INHALATION) at 07:06

## 2025-06-15 RX ADMIN — PIPERACILLIN AND TAZOBACTAM 4.5 G: 4; .5 INJECTION, POWDER, FOR SOLUTION INTRAVENOUS at 09:06

## 2025-06-15 RX ADMIN — MELATONIN TAB 3 MG 6 MG: 3 TAB at 09:06

## 2025-06-15 RX ADMIN — APIXABAN 5 MG: 5 TABLET, FILM COATED ORAL at 08:06

## 2025-06-15 RX ADMIN — LEVALBUTEROL HYDROCHLORIDE 1.25 MG: 0.63 SOLUTION RESPIRATORY (INHALATION) at 11:06

## 2025-06-15 RX ADMIN — POLYETHYLENE GLYCOL 3350 17 G: 17 POWDER, FOR SOLUTION ORAL at 08:06

## 2025-06-15 NOTE — NURSING
Pt reports not sleeping well last night and being exhausted today. C/o nausea with breakfast. Zofran ODT given with good results. He was able to take morning meds but did refuse breakfast. Feeling better by lunch.     PRN Miralax given. Large BM X 1 on bedpan. Rm bath and linen change well. Wound care completed.     Midline intact. Denied pain. VSS. NSR to Sinus arrhythmia with PACs noted (baseline). Afebrile. NADN.

## 2025-06-15 NOTE — PROGRESS NOTES
[facility] Medicine   Progress Note  Room: 212/212   Patient Name: Jordin Allen Jr.  MRN: [id number]  Admit Date: 6/3/2025   Length of Stay:  LOS: 12 days   Attending Physician: Bernardino Guthrie MD  Nurse Practitioner: Erinn Suggs NP    Date of Service: 06/15/2025    Principal Problem:    Acute on chronic respiratory failure with hypoxia    Brief HPI:     Jordin Allen Jr. is a 79 y.o. 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  He presented to [facility] ED on 01/22/2025 with complaints of shortness of breath and left foot redness.  Admitted to hospital medicine team for left foot cellulitis and acute hypoxic respiratory failure secondary to pneumonia/COPD exacerbation. CTA chest negative for PE. Emphysematous change with superimposed edema. Nodular focus within the anterior aspect of left upper lobe.  Started on nebs, prednisone, and empiric vanc/cefepime and doxycycline.  Respiratory panel positive for parainfluenza virus.  Course further complicated by sinus tach with PACs/18, for which he was started on diltiazem.  Blood cultures positive for staph, suspected to be contaminant.  Repeat blood cultures returned negative.  He will switch to Augmentin and doxy.  Ongoing episodes of SVT, thought to be due to underlying hypoxia.  He was able to be weaned down to high-flow nasal cannula 15 L.  Discharged to [facility] on 06/03/2025 for rehab, wound care, and O2 weaning.     6/9-6/11-sent out to [facility] for concerns of MI. Workup revealed findings concerning for pneumonia on CT. Started on empiric vanc and zosyn. Also started on 5 day course of prednisone for COPD exacerbation.    Interval History      Patient has intermittent cough- PRN tessalon pearls initiated yesterday afternoon  Vital signs stable, afebrile   Continue 10 day  course of Zosyn  O2 requirements are stable, on 3 L nasal cannula with O2 sats 95%      Subjective:     Review of Systems   Constitutional:  Positive for malaise/fatigue.   Respiratory:  Positive for cough, sputum production and shortness of breath.    Cardiovascular:  Positive for chest pain. Negative for leg swelling.   Gastrointestinal:  Negative for heartburn, nausea and vomiting.   Genitourinary:  Negative for dysuria and urgency.   Musculoskeletal:  Positive for joint pain (R shoulder). Negative for myalgias.   Neurological:  Positive for weakness. Negative for dizziness.   Psychiatric/Behavioral:  Negative for depression. The patient does not have insomnia.          Objective:     Vital Sign Range  Temp:  [97 °F (36.1 °C)-97.6 °F (36.4 °C)]   Pulse:  [72-98]   Resp:  [12-29]   BP: (113-149)/(57-91)   SpO2:  [92 %-99 %]     Body mass index is 29.17 kg/m².           Intake/Output Summary (Last 24 hours) at 6/15/2025 0719  Last data filed at 6/15/2025 0441  Gross per 24 hour   Intake 1260 ml   Output 1301 ml   Net -41 ml       Physical Exam  Constitutional:       Appearance: He is ill-appearing.   HENT:      Head: Normocephalic and atraumatic.      Mouth/Throat:      Mouth: Mucous membranes are moist.      Pharynx: Oropharynx is clear.   Eyes:      Extraocular Movements: Extraocular movements intact.      Pupils: Pupils are equal, round, and reactive to light.   Cardiovascular:      Rate and Rhythm: Normal rate. Rhythm irregular.   Pulmonary:      Breath sounds: Wheezing and rhonchi present.   Abdominal:      General: Bowel sounds are normal.      Palpations: Abdomen is soft.   Musculoskeletal:      Right lower leg: Edema present.      Left lower leg: Edema present.   Skin:     General: Skin is warm and dry.   Neurological:      Mental Status: He is alert and oriented to person, place, and time. Mental status is at baseline.   Psychiatric:         Mood and Affect: Affect is flat.         Wounds       Wound  05/19/25 0233 Moisture associated dermatitis Left dorsal Foot (Active)   05/19/25 0233 Foot   Present on Original Admission: Y   Primary Wound Type: Moisture ass   Side: Left   Orientation: dorsal        Wound 05/19/25 0231 Moisture associated dermatitis Left lateral Foot (Active)   05/19/25 0231 Foot   Present on Original Admission: Y   Primary Wound Type: Moisture ass   Side: Left   Orientation: lateral        Wound 05/19/25 0231 Pressure Injury Right anterior Ankle (Active)   05/19/25 0231 Ankle   Present on Original Admission: Y   Primary Wound Type: Pressure inj   Side: Right   Orientation: anterior        Wound 05/19/25 0233 Moisture associated dermatitis Left anterior Foot (Active)   05/19/25 0233 Foot   Present on Original Admission:    Primary Wound Type: Moisture ass   Side: Left   Orientation: anterior        Wound 06/04/25 1030 Moisture associated dermatitis Right lateral Abdomen (Active)   06/04/25 1030 Abdomen   Present on Original Admission: Y   Primary Wound Type: Moisture ass   Side: Right   Orientation: lateral        Wound 06/04/25 1030 Moisture associated dermatitis Left lateral Abdomen (Active)   06/04/25 1030 Abdomen   Present on Original Admission: Y   Primary Wound Type: Moisture ass   Side: Left   Orientation: lateral        Wound 06/04/25 1030 Moisture associated dermatitis Right Groin (Active)   06/04/25 1030 Groin   Present on Original Admission: Y   Primary Wound Type: Moisture ass   Side: Right        Wound 06/04/25 1030 Moisture associated dermatitis Left Groin (Active)   06/04/25 1030 Groin   Present on Original Admission: Y   Primary Wound Type: Moisture ass   Side: Left        Wound 06/04/25 1030 Moisture associated dermatitis Sacral spine (Active)   06/04/25 1030 Sacral spine   Present on Original Admission: Y   Primary Wound Type: Moisture ass         Wounds consistently followed by [facility] Tao     Labs:  Recent Labs   Lab 06/10/25  0330 06/11/25  9835  "06/13/25  0413   WBC 12.20 11.82 11.92   HGB 11.6* 10.9* 10.0*   HCT 36.6* 33.3* 31.1*   * 143* 138*     Recent Labs   Lab 06/10/25  0330 06/11/25  0442 06/13/25  0413   * 135* 143   K 4.0 3.1* 4.0   CL 92* 94* 102   CO2 29 30* 31*   BUN 34* 34* 37*   CREATININE 0.9 0.9 1.1   * 162* 167*   CALCIUM 9.0 8.3* 8.6*   MG 2.1 2.1 2.1   PHOS 2.6* 3.2 2.1*     Recent Labs   Lab 06/10/25  0330 06/11/25  0442 06/13/25  0413   ALKPHOS 143 116 130   * 157* 111*   * 121* 43   ALBUMIN 2.2* 1.9* 2.0*   PROT 6.2 5.1* 5.0*   BILITOT 0.6 0.5 0.3       No results for input(s): "POCTGLUCOSE" in the last 72 hours.      Meds Scheduled:   amitriptyline  25 mg Oral QHS    ammonium lactate   Topical (Top) Daily    apixaban  5 mg Oral BID    aspirin  81 mg Oral QAM    atorvastatin  80 mg Oral Daily    diltiaZEM  180 mg Oral Daily    fluticasone propionate  2 spray Each Nostril Daily    guaiFENesin  1,200 mg Oral BID    levalbuterol  1.2495 mg Nebulization Q4H    levETIRAcetam  500 mg Oral BID    mirtazapine  15 mg Oral QHS    pantoprazole  40 mg Oral Daily    piperacillin-tazobactam (Zosyn) IV (PEDS and ADULTS) (extended infusion is not appropriate)  4.5 g Intravenous Q8H    roflumilast  500 mcg Oral Daily    sodium chloride 3%  4 mL Nebulization BID    tiotropium bromide  2 puff Inhalation Daily       Current Inpatient Problem List:  Active Hospital Problems    Diagnosis  POA    *Acute on chronic respiratory failure with hypoxia [J96.21]  Yes    Parainfluenza virus infection [B34.8]  Yes    Pneumonia of right lung due to infectious organism [J18.9]  Yes    Cellulitis [L03.90]  Yes    TIA (transient ischemic attack) [G45.9]  Yes     ASA and statin      Metastasis to brain [C79.31]  Yes    Adenocarcinoma, lung, 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    "    CAD (coronary artery disease) [I25.10]  Yes     Chronic     -[facility] (10/31/13) for stemi: pLAD 100%, Cx with Li's, mRCA 40%, LVEF 55%,. LVEDP 15   -Xience 3.0 x 33 mm to pLAD, post dilated with 3.5 NC and 4.0 NC.   -TTE (8/14/13): LVEF 55%, grade I diastolic dysfunction      HTN (hypertension), benign [I10]  Yes    SHOLA (obstructive sleep apnea) [G47.33]  Yes     CPAP at night      GERD (gastroesophageal reflux disease) [K21.9]  Yes     Continue PPI        Resolved Hospital Problems   No resolved problems to display.         Assessment / Plan:     Acute respiratory failure with hypoxia  COPD with acute exacerbation  Parainfluenza virus infection  Pneumonia   On Symbicort Spiriva and Daliresp at home. Follows up with pulmonology outpatient.   Completed 7 day course of Augmentin and doxycycline on 06/01  Continue Daliresp 500mcg daily  Continue Xopenex p.r.n.   Continue weaning high-flow nasal cannula  Continue guaifenesin 1200 mg b.i.d.  Continue 10 day course of Zosyn  O2 requirements are stable, on 3 L nasal cannula with O2 sats 95%    Chest pain, new   Resolved   Evaluated by cardiology while in the hospital  Will need outpatient follow up with Dr. Ba with Cardiology     Cellulitis   Wound followed and managed by consistent, contracted [facility] NP  Completed 7 day course of doxy and Augmentin  Will need follow up with Podiatry after discharge     Restless leg syndrome   Continue amitriptyline 25 mg nightly     Adenocarcinoma, lung, left   Metastasis to brain  Completed treatment with chemo/XRT. Brain XRT for lesionsx2. off immunotherapy since 12/24  suspect the LLL area was consolidation of prior RXT changes and not malignancy and is nearly resolved.  CT on presentation with new SANJAY nodule, need op follow up with pulm  Continue prophylactic Keppra 500 mg b.i.d.    Right shoulder pain  Having some shoulder pain to his right shoulder, which seems to be some muscle spasms.    Started Robaxin 500 mg q.i.d.  richard on 6/6    Compression fracture of L1 vertebrae with routine healing   TLSO brace when out of bed  F/u with Dr. Hopkins     Dyslipidemia  TIA  CAD   Continue aspirin   Continue atorvastatin 80 mg daily     Tachycardia   Episodes of SVT while at the hospital   Evaluated by Cardiology   Likely worsened by underlying hypoxia   Supplemental O2   continue diltiazem 24 hour capsule 180 mg daily per cardiology recommendations     SHOLA   Continue CPAP nightly     GERD  Continue PPI daily     Left lateral foot wound   Right anterior ankle wound   Traumatic left dorsal foot wound   Left anterior foot venous ulcer   Skin tear right distal arm   Pressure injury nose  Wound followed and managed by consistent, contracted [facility] NP    Advance Care Planning, 06/05/2025  This was a voluntary visit and the option to decline counseling was given  Length of discussion:  16 minutes  Life limiting problem:  Respiratory failure, adenocarcinoma  Topics discussed:  Code status  Outcome of discussion:  Patient would like to remain a full code.  In the event that he is not able to make his own decisions, he would like to defer decision making to his daughter  Decision Maker:Jordin Allen     Psychiatric Assessment  Patient Health Questionnaire-9 (PHQ-9)    Date:  06/05/2025  Total Score: 15  Screening is Positive   Diagnosis and Treatment Plan:  Moderate MDD   Continue amitriptyline 25 mg nightly   Increasing mirtazapine to 15 mg nightly, which will hopefully help with his appetite as well       Diet:  Cardiac   GI Ppx:  PPI   DVT Ppx:  Enoxaparin     Lines:  PIV   Drains:  None   Airways:n/a   Wounds:  Multiple    Goals of Care:   Restorative,  Treat infection  Optimize nutrition  Wound healing  Muscle strengthening  Restoration of ADL's  Improve mobility    Anticipated Disposition:    Anticipated d/c 6/24 IPR vs home    Follow up appointments:   Future Appointments   Date Time Provider Department Center   6/24/2025  7:15 AM [facility]  OI-MRI2 [facility]   6/24/2025  9:30 AM Steven Campos MD [facility] NEUROSC [place]   6/24/2025  2:00 PM Bienvenido Henson MD [facility] RADONC3 [place]   7/1/2025 10:30 AM [facility] OI-XRAY [facility] XRAY [facility]   7/1/2025 11:30 AM Bolivar Sr PA [facility] NEUROS8 [facility]   7/8/2025  9:15 AM MEEKS, VISUAL FIELDS [facility] OPHTHAL [facility]   7/8/2025 10:00 AM Cara Looney MD [facility] OPHTHAL [facility]   7/15/2025 10:00 AM Garrett Lloyd DPM [facility] POD [facility]   7/23/2025 10:15 AM CV [facility] ECHO [facility] CARDIA [place]   8/15/2025  9:20 AM Bienvenido Ward MD [facility] PULMON [place]   8/27/2025  9:00 AM Guido Trejo MD [facility] ENT [facility]   9/8/2025 11:10 AM Yan Palmer MD [facility] DERM [facility]         Erinn Suggs NP  Hospital Medicine [facility]    I have spent 51 minutes reviewing patient records, examining, and  of the patient/ patient's family with greater than 50% of the time spent with direct patient care and coordination.            97

## 2025-06-15 NOTE — PLAN OF CARE
Problem: Breathing Pattern Ineffective  Goal: Effective Breathing Pattern  Intervention: Promote Improved Breathing Pattern  Flowsheets (Taken 6/15/2025 1226)  Airway/Ventilation Management:   airway patency maintained   humidification applied   pulmonary hygiene promoted  Breathing Techniques/Airway Clearance: deep/controlled cough encouraged  Head of Bed (HOB) Positioning: HOB elevated     Problem: Airway Clearance Ineffective  Goal: Effective Airway Clearance  Intervention: Promote Airway Secretion Clearance  Flowsheets (Taken 6/15/2025 1226)  Breathing Techniques/Airway Clearance: deep/controlled cough encouraged  Cough And Deep Breathing: done independently per patient

## 2025-06-15 NOTE — PLAN OF CARE
Weekly POC reviewed with pt.     Problem: Adult Inpatient Plan of Care  Goal: Plan of Care Review  6/15/2025 1636 by Qian Lock RN  Flowsheets (Taken 6/15/2025 1600)  Plan of Care Reviewed With: patient  6/15/2025 1636 by Qian Lock RN  Outcome: Progressing  Goal: Patient-Specific Goal (Individualized)  Outcome: Progressing  Goal: Absence of Hospital-Acquired Illness or Injury  Outcome: Progressing  Goal: Optimal Comfort and Wellbeing  Outcome: Progressing  Goal: Readiness for Transition of Care  Outcome: Progressing     Problem: Pneumonia  Goal: Fluid Balance  Outcome: Progressing  Goal: Resolution of Infection Signs and Symptoms  Outcome: Progressing  Goal: Effective Oxygenation and Ventilation  Outcome: Progressing     Problem: Wound  Goal: Optimal Coping  Outcome: Progressing  Goal: Optimal Functional Ability  Outcome: Progressing  Goal: Absence of Infection Signs and Symptoms  Outcome: Progressing  Goal: Improved Oral Intake  Outcome: Progressing  Goal: Optimal Pain Control and Function  Outcome: Progressing  Goal: Skin Health and Integrity  Outcome: Progressing  Goal: Optimal Wound Healing  Outcome: Progressing     Problem: Skin Injury Risk Increased  Goal: Skin Health and Integrity  Outcome: Progressing     Problem: Fall Injury Risk  Goal: Absence of Fall and Fall-Related Injury  Outcome: Progressing     Problem: Breathing Pattern Ineffective  Goal: Effective Breathing Pattern  Outcome: Progressing     Problem: Airway Clearance Ineffective  Goal: Effective Airway Clearance  Outcome: Progressing     Problem: Gas Exchange Impaired  Goal: Optimal Gas Exchange  Outcome: Progressing     Problem: Noninvasive Ventilation Acute  Goal: Effective Unassisted Ventilation and Oxygenation  Outcome: Progressing     Problem: Infection  Goal: Absence of Infection Signs and Symptoms  Outcome: Progressing

## 2025-06-15 NOTE — CARE UPDATE
06/14/25 1910   Patient Assessment/Suction   Level of Consciousness (AVPU) alert   Respiratory Effort Unlabored   Expansion/Accessory Muscles/Retractions no use of accessory muscles;no retractions   All Lung Fields Breath Sounds diminished   Rhythm/Pattern, Respiratory unlabored;pattern regular   Cough Frequency no cough   PRE-TX-O2   Device (Oxygen Therapy) nasal cannula with humidification   Flow (L/min) (Oxygen Therapy) 4   SpO2 (!) 93 %   Pulse Oximetry Type Continuous   $ Pulse Oximetry - Multiple Charge Pulse Oximetry - Multiple   Pulse 77   Resp 18   Positioning HOB elevated 30 degrees   Aerosol Therapy   $ Aerosol Therapy Charges Aerosol Treatment   Respiratory Treatment Status (SVN) given   Treatment Route (SVN) mask   Patient Position HOB elevated   Post Treatment Assessment (SVN) breath sounds unchanged   Signs of Intolerance (SVN) none   Breath Sounds Post-Respiratory Treatment   Post-treatment Heart Rate (beats/min) 81   Post-treatment Resp Rate (breaths/min) 19   Vibratory PEP Therapy   $ Vibratory PEP Charges Acapella Therapy   $ Vibratory PEP Equipment Blue Acapella - equipment   $ Vibratory PEP Tech Time Charges 15 min   Type (PEP Therapy) vibratory/oscillatory   Device (PEP Therapy) Acapella low (blue)   Route (PEP Therapy) mouthpiece   Breaths per Cycle (PEP Therapy) 10   Cycles (PEP Therapy) 1   Post Treatment Assessment (PEP) breath sounds unchanged   Signs of Intolerance (PEP Therapy) none   Respiratory Evaluation   $ Care Plan Tech Time 15 min

## 2025-06-16 LAB
ABSOLUTE NEUTROPHIL MANUAL (OHS): 13.3 K/UL
ALBUMIN SERPL BCP-MCNC: 2 G/DL (ref 3.5–5.2)
ALP SERPL-CCNC: 104 UNIT/L (ref 40–150)
ALT SERPL W/O P-5'-P-CCNC: 75 UNIT/L (ref 10–44)
ANION GAP (OHS): 9 MMOL/L (ref 8–16)
ANISOCYTOSIS BLD QL SMEAR: SLIGHT
AST SERPL-CCNC: 25 UNIT/L (ref 11–45)
BILIRUB SERPL-MCNC: 0.6 MG/DL (ref 0.1–1)
BNP SERPL-MCNC: 71 PG/ML (ref 0–99)
BUN SERPL-MCNC: 21 MG/DL (ref 8–23)
CALCIUM SERPL-MCNC: 8.7 MG/DL (ref 8.7–10.5)
CHLORIDE SERPL-SCNC: 106 MMOL/L (ref 95–110)
CO2 SERPL-SCNC: 27 MMOL/L (ref 23–29)
CREAT SERPL-MCNC: 0.8 MG/DL (ref 0.5–1.4)
ERYTHROCYTE [DISTWIDTH] IN BLOOD BY AUTOMATED COUNT: 16.9 % (ref 11.5–14.5)
GFR SERPLBLD CREATININE-BSD FMLA CKD-EPI: >60 ML/MIN/1.73/M2
GLUCOSE SERPL-MCNC: 137 MG/DL (ref 70–110)
HCT VFR BLD AUTO: 33.8 % (ref 40–54)
HGB BLD-MCNC: 10.6 GM/DL (ref 14–18)
MAGNESIUM SERPL-MCNC: 1.8 MG/DL (ref 1.6–2.6)
MCH RBC QN AUTO: 28.4 PG (ref 27–31)
MCHC RBC AUTO-ENTMCNC: 31.4 G/DL (ref 32–36)
MCV RBC AUTO: 91 FL (ref 82–98)
METAMYELOCYTES NFR BLD MANUAL: 1 %
MONOCYTES NFR BLD MANUAL: 1 % (ref 4–15)
NEUTROPHILS NFR BLD MANUAL: 97 % (ref 38–73)
NEUTS BAND NFR BLD MANUAL: 1 %
NUCLEATED RBC (/100WBC) (OHS): 0 /100 WBC
OHS QRS DURATION: 70 MS
OHS QTC CALCULATION: 438 MS
OVALOCYTES BLD QL SMEAR: ABNORMAL
PHOSPHATE SERPL-MCNC: 2.4 MG/DL (ref 2.7–4.5)
PLATELET # BLD AUTO: 125 K/UL (ref 150–450)
PMV BLD AUTO: 10.2 FL (ref 9.2–12.9)
POIKILOCYTOSIS BLD QL SMEAR: SLIGHT
POLYCHROMASIA BLD QL SMEAR: ABNORMAL
POTASSIUM SERPL-SCNC: 3.8 MMOL/L (ref 3.5–5.1)
PROT SERPL-MCNC: 5 GM/DL (ref 6–8.4)
RBC # BLD AUTO: 3.73 M/UL (ref 4.6–6.2)
SODIUM SERPL-SCNC: 142 MMOL/L (ref 136–145)
SPHEROCYTES BLD QL SMEAR: ABNORMAL
WBC # BLD AUTO: 13.54 K/UL (ref 3.9–12.7)

## 2025-06-16 PROCEDURE — 25000003 PHARM REV CODE 250: Performed by: STUDENT IN AN ORGANIZED HEALTH CARE EDUCATION/TRAINING PROGRAM

## 2025-06-16 PROCEDURE — 82947 ASSAY GLUCOSE BLOOD QUANT: CPT | Performed by: STUDENT IN AN ORGANIZED HEALTH CARE EDUCATION/TRAINING PROGRAM

## 2025-06-16 PROCEDURE — 94640 AIRWAY INHALATION TREATMENT: CPT

## 2025-06-16 PROCEDURE — 25000242 PHARM REV CODE 250 ALT 637 W/ HCPCS

## 2025-06-16 PROCEDURE — 83735 ASSAY OF MAGNESIUM: CPT | Performed by: STUDENT IN AN ORGANIZED HEALTH CARE EDUCATION/TRAINING PROGRAM

## 2025-06-16 PROCEDURE — 99900035 HC TECH TIME PER 15 MIN (STAT)

## 2025-06-16 PROCEDURE — 11000001 HC ACUTE MED/SURG PRIVATE ROOM

## 2025-06-16 PROCEDURE — 94761 N-INVAS EAR/PLS OXIMETRY MLT: CPT

## 2025-06-16 PROCEDURE — 87205 SMEAR GRAM STAIN: CPT | Performed by: STUDENT IN AN ORGANIZED HEALTH CARE EDUCATION/TRAINING PROGRAM

## 2025-06-16 PROCEDURE — 93005 ELECTROCARDIOGRAM TRACING: CPT

## 2025-06-16 PROCEDURE — 93010 ELECTROCARDIOGRAM REPORT: CPT | Mod: ,,, | Performed by: INTERNAL MEDICINE

## 2025-06-16 PROCEDURE — 25000003 PHARM REV CODE 250: Performed by: HOSPITALIST

## 2025-06-16 PROCEDURE — 84100 ASSAY OF PHOSPHORUS: CPT | Performed by: STUDENT IN AN ORGANIZED HEALTH CARE EDUCATION/TRAINING PROGRAM

## 2025-06-16 PROCEDURE — 83880 ASSAY OF NATRIURETIC PEPTIDE: CPT | Performed by: STUDENT IN AN ORGANIZED HEALTH CARE EDUCATION/TRAINING PROGRAM

## 2025-06-16 PROCEDURE — 97530 THERAPEUTIC ACTIVITIES: CPT | Mod: CQ

## 2025-06-16 PROCEDURE — 27000173 HC ACAPELLA DEVICE DH OR DM

## 2025-06-16 PROCEDURE — 85027 COMPLETE CBC AUTOMATED: CPT | Performed by: STUDENT IN AN ORGANIZED HEALTH CARE EDUCATION/TRAINING PROGRAM

## 2025-06-16 PROCEDURE — 25000003 PHARM REV CODE 250

## 2025-06-16 PROCEDURE — 36415 COLL VENOUS BLD VENIPUNCTURE: CPT | Performed by: STUDENT IN AN ORGANIZED HEALTH CARE EDUCATION/TRAINING PROGRAM

## 2025-06-16 PROCEDURE — 63600175 PHARM REV CODE 636 W HCPCS: Performed by: HOSPITALIST

## 2025-06-16 PROCEDURE — 27000221 HC OXYGEN, UP TO 24 HOURS

## 2025-06-16 PROCEDURE — 94664 DEMO&/EVAL PT USE INHALER: CPT

## 2025-06-16 PROCEDURE — 63600175 PHARM REV CODE 636 W HCPCS: Performed by: STUDENT IN AN ORGANIZED HEALTH CARE EDUCATION/TRAINING PROGRAM

## 2025-06-16 RX ORDER — DILTIAZEM HCL/D5W 125 MG/125
5 PLASTIC BAG, INJECTION (ML) INTRAVENOUS CONTINUOUS
Status: DISCONTINUED | OUTPATIENT
Start: 2025-06-16 | End: 2025-06-16

## 2025-06-16 RX ORDER — DILTIAZEM HYDROCHLORIDE 5 MG/ML
10 INJECTION INTRAVENOUS ONCE
Status: COMPLETED | OUTPATIENT
Start: 2025-06-16 | End: 2025-06-16

## 2025-06-16 RX ORDER — DILTIAZEM HYDROCHLORIDE 5 MG/ML
INJECTION INTRAVENOUS
Status: DISPENSED
Start: 2025-06-16 | End: 2025-06-16

## 2025-06-16 RX ORDER — DILTIAZEM HYDROCHLORIDE 5 MG/ML
20 INJECTION INTRAVENOUS ONCE
Status: COMPLETED | OUTPATIENT
Start: 2025-06-16 | End: 2025-06-16

## 2025-06-16 RX ADMIN — TIOTROPIUM BROMIDE INHALATION SPRAY 2 PUFF: 3.12 SPRAY, METERED RESPIRATORY (INHALATION) at 07:06

## 2025-06-16 RX ADMIN — GUAIFENESIN 1200 MG: 600 TABLET, MULTILAYER, EXTENDED RELEASE ORAL at 09:06

## 2025-06-16 RX ADMIN — METHOCARBAMOL 500 MG: 500 TABLET ORAL at 09:06

## 2025-06-16 RX ADMIN — MIRTAZAPINE 15 MG: 15 TABLET, FILM COATED ORAL at 09:06

## 2025-06-16 RX ADMIN — OXYCODONE HYDROCHLORIDE AND ACETAMINOPHEN 1 TABLET: 5; 325 TABLET ORAL at 06:06

## 2025-06-16 RX ADMIN — DILTIAZEM HYDROCHLORIDE 10 MG: 5 INJECTION, SOLUTION INTRAVENOUS at 07:06

## 2025-06-16 RX ADMIN — LEVALBUTEROL HYDROCHLORIDE 1.25 MG: 0.63 SOLUTION RESPIRATORY (INHALATION) at 07:06

## 2025-06-16 RX ADMIN — PIPERACILLIN AND TAZOBACTAM 4.5 G: 4; .5 INJECTION, POWDER, FOR SOLUTION INTRAVENOUS at 05:06

## 2025-06-16 RX ADMIN — FLUTICASONE PROPIONATE 100 MCG: 50 SPRAY, METERED NASAL at 08:06

## 2025-06-16 RX ADMIN — METHOCARBAMOL 500 MG: 500 TABLET ORAL at 06:06

## 2025-06-16 RX ADMIN — DILTIAZEM HYDROCHLORIDE 20 MG: 5 INJECTION, SOLUTION INTRAVENOUS at 08:06

## 2025-06-16 RX ADMIN — LEVETIRACETAM 500 MG: 500 TABLET, FILM COATED ORAL at 08:06

## 2025-06-16 RX ADMIN — LEVALBUTEROL HYDROCHLORIDE 1.25 MG: 0.63 SOLUTION RESPIRATORY (INHALATION) at 03:06

## 2025-06-16 RX ADMIN — VANCOMYCIN HYDROCHLORIDE 1000 MG: 1 INJECTION, POWDER, LYOPHILIZED, FOR SOLUTION INTRAVENOUS at 09:06

## 2025-06-16 RX ADMIN — LEVALBUTEROL HYDROCHLORIDE 1.25 MG: 0.63 SOLUTION RESPIRATORY (INHALATION) at 11:06

## 2025-06-16 RX ADMIN — LEVETIRACETAM 500 MG: 500 TABLET, FILM COATED ORAL at 09:06

## 2025-06-16 RX ADMIN — GUAIFENESIN 1200 MG: 600 TABLET, MULTILAYER, EXTENDED RELEASE ORAL at 08:06

## 2025-06-16 RX ADMIN — ASPIRIN 81 MG: 81 TABLET, COATED ORAL at 08:06

## 2025-06-16 RX ADMIN — DILTIAZEM HYDROCHLORIDE 180 MG: 180 CAPSULE, EXTENDED RELEASE ORAL at 08:06

## 2025-06-16 RX ADMIN — PANTOPRAZOLE SODIUM 40 MG: 40 TABLET, DELAYED RELEASE ORAL at 08:06

## 2025-06-16 RX ADMIN — DILTIAZEM HYDROCHLORIDE 10 MG/HR: 5 INJECTION, SOLUTION INTRAVENOUS at 10:06

## 2025-06-16 RX ADMIN — OXYCODONE HYDROCHLORIDE AND ACETAMINOPHEN 1 TABLET: 5; 325 TABLET ORAL at 09:06

## 2025-06-16 RX ADMIN — APIXABAN 5 MG: 5 TABLET, FILM COATED ORAL at 08:06

## 2025-06-16 RX ADMIN — ATORVASTATIN CALCIUM 80 MG: 80 TABLET, FILM COATED ORAL at 08:06

## 2025-06-16 RX ADMIN — Medication: at 08:06

## 2025-06-16 RX ADMIN — Medication 4 ML: at 07:06

## 2025-06-16 RX ADMIN — DILTIAZEM HYDROCHLORIDE 5 MG/HR: 5 INJECTION, SOLUTION INTRAVENOUS at 05:06

## 2025-06-16 RX ADMIN — LEVALBUTEROL HYDROCHLORIDE 1.25 MG: 0.63 SOLUTION RESPIRATORY (INHALATION) at 12:06

## 2025-06-16 RX ADMIN — PIPERACILLIN AND TAZOBACTAM 4.5 G: 4; .5 INJECTION, POWDER, FOR SOLUTION INTRAVENOUS at 02:06

## 2025-06-16 RX ADMIN — PIPERACILLIN AND TAZOBACTAM 4.5 G: 4; .5 INJECTION, POWDER, FOR SOLUTION INTRAVENOUS at 09:06

## 2025-06-16 RX ADMIN — APIXABAN 5 MG: 5 TABLET, FILM COATED ORAL at 09:06

## 2025-06-16 RX ADMIN — VANCOMYCIN HYDROCHLORIDE 1250 MG: 1.25 INJECTION, POWDER, LYOPHILIZED, FOR SOLUTION INTRAVENOUS at 10:06

## 2025-06-16 RX ADMIN — ROFLUMILAST 500 MCG: 500 TABLET ORAL at 08:06

## 2025-06-16 RX ADMIN — AMITRIPTYLINE HYDROCHLORIDE 25 MG: 25 TABLET, FILM COATED ORAL at 09:06

## 2025-06-16 NOTE — NURSING
12lead EKG done, Diltiazem 20mg ivp given.  HR 81, bp131/61.  [id number]%.  Pt opens eyes to verbal stim, responds appropriately.

## 2025-06-16 NOTE — RESPIRATORY THERAPY
Patient remains on 4L NC, no distress noted, resting comfortable. O2 Sat dropped to 87-89% in AM during increased heart rate period. Increased to 6L NC and weaned down to 4L during the day. O2 Sat 100%, HR 72, RR 22.

## 2025-06-16 NOTE — NURSING
Patient started on Diltiazem at 10 mg/hr at 10:26 am. Patient current HR 82, will continue to monitor.

## 2025-06-16 NOTE — PROGRESS NOTES
[facility] Medicine   Progress Note  Room: 212/212   Patient Name: Jordin Allen Jr.  MRN: [id number]  Admit Date: 6/3/2025   Length of Stay:  LOS: 13 days   Attending Physician: Bernardino Guthrie MD  Nurse Practitioner: Ernestine Palomino NP    Date of Service: 06/16/2025    Principal Problem:    Acute on chronic respiratory failure with hypoxia    Brief HPI:     Jordin Allen Jr. is a 79 y.o. 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  He [place] to [facility] [place] [facility] [place] on [place]22/[place]25 with complaints of shortness of breath and left foot redness.  Admitted to hospital medicine team for left foot cellulitis and acute hypoxic respiratory failure secondary to pneumonia/COPD exacerbation. CTA chest negative for PE. Emphysematous change with superimposed edema. Nodular focus within the anterior aspect of left upper lobe.  Started on nebs, prednisone, and empiric vanc/cefepime and doxycycline.  Respiratory panel positive for parainfluenza virus.  Course further complicated by sinus tach with PACs/18, for which he was started on diltiazem.  Blood cultures positive for staph, suspected to be contaminant.  Repeat blood cultures returned negative.  He will switch to Augmentin and doxy.  Ongoing episodes of SVT, thought to be due to underlying hypoxia.  He was able to be weaned down to high-flow nasal cannula 15 L.  Discharged to [facility] on 06/03/2025 for rehab, wound care, and O2 weaning.     6/9-6/11-sent out to [facility] for concerns of MI. Workup revealed findings concerning for pneumonia on CT. Started on empiric vanc and zosyn. Also started on 5 day course of prednisone for COPD exacerbation.    Interval History    Tachycardic this morning with heart rate sustained greater than 150.  MD Guthrie notified and given diltiazem with improvement,  however shortly after became tachycardic again. Starting diltiazem drip.  Twelve lead EKG completed, with AFib RVR and heart rate of 139  Increasing O2 requirements over the weekend, now on 5 L nasal cannula with O2 sats 93-94%   Ordering CXR and BNP, as well as sputum culture  Labs reviewed and listed below, no critical values.  WBC slightly up to 13.4 today.  Possibly reactive to recent steroids, though WBCs were WNL since 06/10, and prednisone course completed on 06/13.  No fevers, though clinically patient did seem to improve in oxygen requirements when he was sent out to ED on 06/09 and received Zosyn and vancomycin  Resuming course of vancomycin today  Continue course of Zosyn      Subjective:     Review of Systems   Constitutional:  Positive for malaise/fatigue.   Respiratory:  Positive for cough, sputum production and shortness of breath.    Cardiovascular:  Negative for chest pain and leg swelling.   Gastrointestinal:  Negative for heartburn, nausea and vomiting.   Genitourinary:  Negative for dysuria and urgency.   Musculoskeletal:  Positive for joint pain (R shoulder). Negative for myalgias.   Neurological:  Positive for weakness. Negative for dizziness.   Psychiatric/Behavioral:  Negative for depression. The patient does not have insomnia.          Objective:     Vital Sign Range  Temp:  [97.4 °F (36.3 °C)-98.5 °F (36.9 °C)]   Pulse:  []   Resp:  [19-33]   BP: (125-154)/(68-92)   SpO2:  [91 %-96 %]     Body mass index is 30.15 kg/m².           Intake/Output Summary (Last 24 hours) at 6/16/2025 0851  Last data filed at 6/16/2025 0719  Gross per 24 hour   Intake 480 ml   Output 875 ml   Net -395 ml       Physical Exam  Constitutional:       Appearance: He is ill-appearing.   HENT:      Head: Normocephalic and atraumatic.      Mouth/Throat:      Mouth: Mucous membranes are moist.      Pharynx: Oropharynx is clear.   Eyes:      Extraocular Movements: Extraocular movements intact.      Pupils: Pupils  are equal, round, and reactive to light.   Cardiovascular:      Rate and Rhythm: Normal rate. Rhythm irregular.   Pulmonary:      Breath sounds: Wheezing and rhonchi present.   Abdominal:      General: Bowel sounds are normal.      Palpations: Abdomen is soft.   Musculoskeletal:      Right lower leg: Edema present.      Left lower leg: Edema present.   Skin:     General: Skin is warm and dry.   Neurological:      Mental Status: He is alert and oriented to person, place, and time. Mental status is at baseline.   Psychiatric:         Mood and Affect: Affect is flat.         Wounds       Wound 05/19/25 0233 Moisture associated dermatitis Left dorsal Foot (Active)   05/19/25 0233 Foot   Present on Original Admission: Y   Primary Wound Type: Moisture ass   Side: Left   Orientation: dorsal        Wound 05/19/25 0231 Moisture associated dermatitis Left lateral Foot (Active)   05/19/25 0231 Foot   Present on Original Admission: Y   Primary Wound Type: Moisture ass   Side: Left   Orientation: lateral        Wound 05/19/25 0231 Pressure Injury Right anterior Ankle (Active)   05/19/25 0231 Ankle   Present on Original Admission: Y   Primary Wound Type: Pressure inj   Side: Right   Orientation: anterior        Wound 05/19/25 0233 Moisture associated dermatitis Left anterior Foot (Active)   05/19/25 0233 Foot   Present on Original Admission:    Primary Wound Type: Moisture ass   Side: Left   Orientation: anterior        Wound 06/04/25 1030 Moisture associated dermatitis Right lateral Abdomen (Active)   06/04/25 1030 Abdomen   Present on Original Admission: Y   Primary Wound Type: Moisture ass   Side: Right   Orientation: lateral        Wound 06/04/25 1030 Moisture associated dermatitis Left lateral Abdomen (Active)   06/04/25 1030 Abdomen   Present on Original Admission: Y   Primary Wound Type: Moisture ass   Side: Left   Orientation: lateral        Wound 06/04/25 1030 Moisture associated dermatitis Right Groin (Active)  "  06/04/25 1030 Groin   Present on Original Admission: Y   Primary Wound Type: Moisture ass   Side: Right        Wound 06/04/25 1030 Moisture associated dermatitis Left Groin (Active)   06/04/25 1030 Groin   Present on Original Admission: Y   Primary Wound Type: Moisture ass   Side: Left        Wound 06/04/25 1030 Moisture associated dermatitis Sacral spine (Active)   06/04/25 1030 Sacral spine   Present on Original Admission: Y   Primary Wound Type: Moisture ass         Wounds consistently followed by [facility] NP Sheree Tao     Labs:  Recent Labs   Lab 06/11/25 0441 06/13/25  0413 06/16/25  0429   WBC 11.82 11.92 13.54*   HGB 10.9* 10.0* 10.6*   HCT 33.3* 31.1* 33.8*   * 138* 125*     Recent Labs   Lab 06/11/25 0442 06/13/25 0413 06/16/25  0429   * 143 142   K 3.1* 4.0 3.8   CL 94* 102 106   CO2 30* 31* 27   BUN 34* 37* 21   CREATININE 0.9 1.1 0.8   * 167* 137*   CALCIUM 8.3* 8.6* 8.7   MG 2.1 2.1 1.8   PHOS 3.2 2.1* 2.4*     Recent Labs   Lab 06/11/25 0442 06/13/25 0413 06/16/25  0429   ALKPHOS 116 130 104   * 111* 75*   * 43 25   ALBUMIN 1.9* 2.0* 2.0*   PROT 5.1* 5.0* 5.0*   BILITOT 0.5 0.3 0.6       No results for input(s): "POCTGLUCOSE" in the last 72 hours.      Meds Scheduled:   amitriptyline  25 mg Oral QHS    ammonium lactate   Topical (Top) Daily    apixaban  5 mg Oral BID    aspirin  81 mg Oral QAM    atorvastatin  80 mg Oral Daily    diltiaZEM        diltiaZEM  180 mg Oral Daily    fluticasone propionate  2 spray Each Nostril Daily    guaiFENesin  1,200 mg Oral BID    levalbuterol  1.2495 mg Nebulization Q4H    levETIRAcetam  500 mg Oral BID    mirtazapine  15 mg Oral QHS    pantoprazole  40 mg Oral Daily    piperacillin-tazobactam (Zosyn) IV (PEDS and ADULTS) (extended infusion is not appropriate)  4.5 g Intravenous Q8H    roflumilast  500 mcg Oral Daily    sodium chloride 3%  4 mL Nebulization BID    tiotropium bromide  2 puff Inhalation Daily "       Current Inpatient Problem List:  Active Hospital Problems    Diagnosis  POA    *Acute on chronic respiratory failure with hypoxia [J96.21]  Yes    Parainfluenza virus infection [B34.8]  Yes    Pneumonia of right lung due to infectious organism [J18.9]  Yes    Cellulitis [L03.90]  Yes    TIA (transient ischemic attack) [G45.9]  Yes     ASA and statin      Metastasis to brain [C79.31]  Yes    Adenocarcinoma, lung, 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       CAD (coronary artery disease) [I25.10]  Yes     Chronic     -LHC (10/31/13) for stemi: pLAD 100%, Cx with Li's, mRCA 40%, LVEF 55%,. LVEDP 15   -Xience 3.0 x 33 mm to pLAD, post dilated with 3.5 NC and 4.0 NC.   -TTE (8/14/13): LVEF 55%, grade I diastolic dysfunction      HTN (hypertension), benign [I10]  Yes    SHOLA (obstructive sleep apnea) [G47.33]  Yes     CPAP at night      GERD (gastroesophageal reflux disease) [K21.9]  Yes     Continue PPI        Resolved Hospital Problems   No resolved problems to display.         Assessment / Plan:     Acute respiratory failure with hypoxia  COPD with acute exacerbation  Parainfluenza virus infection  Pneumonia   On Symbicort Spiriva and Daliresp at home. Follows up with pulmonology outpatient.   Completed 7 day course of Augmentin and doxycycline on 06/01  Continue Daliresp 500mcg daily  Continue Xopenex p.r.n.   Continue weaning high-flow nasal cannula  Continue guaifenesin 1200 mg b.i.d.  Increasing O2 requirements over the weekend, now on 5 L nasal cannula with O2 sats 93-94%   Ordering CXR and BNP, as well as sputum culture  Consulting pulmonology   WBC slightly up to 13.4 today.  Possibly reactive to recent steroids, though WBCs were WNL since 06/10, and prednisone course completed on 06/13.  No fevers, though clinically patient did seem to improve in  oxygen requirements when he was sent out to ED on 06/09 and received Zosyn and vancomycin  Resuming course of vancomycin today  Continue course of Zosyn    Leukocytosis, new   WBC slightly up to 13.4 today.  Possibly reactive to recent steroids, though WBCs were WNL since 06/10, and prednisone course completed on 06/13.  No fevers, though clinically patient did seem to improve in oxygen requirements when he was sent out to ED on 06/09 and received Zosyn and vancomycin  Resuming course of vancomycin today  Continue course of Zosyn    Chest pain, new   Resolved   Evaluated by cardiology while in the hospital  Will need outpatient follow up with Dr. Ba with Cardiology     Cellulitis   Wound followed and managed by consistent, contracted [facility] NP  Completed 7 day course of doxy and Augmentin  Will need follow up with Podiatry after discharge     Restless leg syndrome   Continue amitriptyline 25 mg nightly     Adenocarcinoma, lung, left   Metastasis to brain  Completed treatment with chemo/XRT. Brain XRT for lesionsx2. off immunotherapy since 12/24  suspect the LLL area was consolidation of prior RXT changes and not malignancy and is nearly resolved.  CT on presentation with new SANJAY nodule, need op follow up with pulm  Continue prophylactic Keppra 500 mg b.i.d.    Right shoulder pain  Having some shoulder pain to his right shoulder, which seems to be some muscle spasms.    Started Robaxin 500 mg q.i.d. p.r.n. on 6/6    Compression fracture of L1 vertebrae with routine healing   TLSO brace when out of bed  F/u with Dr. Hopkins     Dyslipidemia  TIA  CAD   Continue aspirin   Continue atorvastatin 80 mg daily     Tachycardia   Episodes of SVT while at the hospital   Evaluated by Cardiology   Likely worsened by underlying hypoxia   Supplemental O2   continue diltiazem 24 hour capsule 180 mg daily per cardiology recommendations  Tachycardic this morning with heart rate sustained greater than 150.  MD Guthrie notified and  given diltiazem with improvement  Twelve lead EKG completed, with AFib RVR and heart rate of 139  Became tachycardic again shortly after receiving diltiazem   Started on diltiazem drip at 10 mg/hour.  Heart rate improved within an hour, so decrease down to 5 mg/hour       SHOLA   Continue CPAP nightly     GERD  Continue PPI daily     Left lateral foot wound   Right anterior ankle wound   Traumatic left dorsal foot wound   Left anterior foot venous ulcer   Skin tear right distal arm   Pressure injury nose  Wound followed and managed by consistent, contracted [facility] NP    Advance Care Planning, 06/05/2025  This was a voluntary visit and the option to decline counseling was given  Length of discussion:  16 minutes  Life limiting problem:  Respiratory failure, adenocarcinoma  Topics discussed:  Code status  Outcome of discussion:  Patient would like to remain a full code.  In the event that he is not able to make his own decisions, he would like to defer decision making to his daughter  Decision Maker:Jordin Allen     Psychiatric Assessment  Patient Health Questionnaire-9 (PHQ-9)    Date:  06/05/2025  Total Score: 15  Screening is Positive   Diagnosis and Treatment Plan:  Moderate MDD   Continue amitriptyline 25 mg nightly   Increasing mirtazapine to 15 mg nightly, which will hopefully help with his appetite as well       Diet:  Cardiac   GI Ppx:  PPI   DVT Ppx:  Enoxaparin     Lines:  PIV   Drains:  None   Airways:n/a   Wounds:  Multiple    Goals of Care:   Restorative,  Treat infection  Optimize nutrition  Wound healing  Muscle strengthening  Restoration of ADL's  Improve mobility    Anticipated Disposition:    Anticipated d/c 6/24 IPR vs home    Follow up appointments:   Future Appointments   Date Time Provider Department Center   6/24/2025  7:15 AM [facility]MRI[facility]   6/24/2025  9:30 AM Steven Campos MD [facility] NEUROS [facility]   6/24/2025  2:00 PM Bienvenido Henson MD [facility] RADONC3  [facility]   7/1/2025 10:30 AM [facility]   7/1/2025 11:30 AM Bolivar Sr PA [facility] NEUROS8 [facility]   7/8/2025  9:15 AM MEEKS, VISUAL FIELDS [facility] OPHTHAL [facility]   7/8/2025 10:00 AM Cara Looney MD [facility] OPHTHAL [facility]   7/15/2025 10:00 AM Garrett Lloyd DPM [facility] POD [facility]   7/23/2025 10:15 AM CV [facility] ECHO [facility] CARDIA [place]   8/15/2025  9:20 AM Bienvenido Ward MD [facility] PULMON [place]   8/27/2025  9:00 AM Guido Trejo MD [facility] ENT [facility]   9/8/2025 11:10 AM Yan Palmer MD [facility] DERM [facility]         Ernestine Palomino, NP  Hospital Medicine [facility]    I have spent 91 minutes reviewing patient records, examining, and  of the patient/ patient's family with greater than 50% of the time spent with direct patient care and coordination.

## 2025-06-16 NOTE — CARE UPDATE
06/15/25 1915   Patient Assessment/Suction   Level of Consciousness (AVPU) alert   Respiratory Effort Unlabored   Expansion/Accessory Muscles/Retractions no use of accessory muscles;no retractions   All Lung Fields Breath Sounds coarse;diminished   Rhythm/Pattern, Respiratory unlabored;pattern regular   Cough Frequency infrequent   PRE-TX-O2   Device (Oxygen Therapy) nasal cannula with humidification   $ Is the patient on Low Flow Oxygen? Yes   Flow (L/min) (Oxygen Therapy) 3   SpO2 95 %   Pulse Oximetry Type Continuous   $ Pulse Oximetry - Multiple Charge Pulse Oximetry - Multiple   Pulse 93   Resp (!) 22   Positioning HOB elevated 30 degrees   Aerosol Therapy   $ Aerosol Therapy Charges Aerosol Treatment   Respiratory Treatment Status (SVN) given   Treatment Route (SVN) mask   Patient Position HOB elevated   Post Treatment Assessment (SVN) breath sounds unchanged   Signs of Intolerance (SVN) none   Breath Sounds Post-Respiratory Treatment   Post-treatment Heart Rate (beats/min) 95   Post-treatment Resp Rate (breaths/min) 22   Vibratory PEP Therapy   $ Vibratory PEP Charges Acapella Therapy   $ Vibratory PEP Equipment Blue Acapella - equipment   $ Vibratory PEP Tech Time Charges 15 min   Daily Review of Necessity (PEP Therapy) completed   Type (PEP Therapy) vibratory/oscillatory   Device (PEP Therapy) Acapella low (blue)   Route (PEP Therapy) mouthpiece   Breaths per Cycle (PEP Therapy) 10   Cycles (PEP Therapy) 1   Patient Position (PEP Therapy) HOB elevated   Post Treatment Assessment (PEP) breath sounds unchanged   Signs of Intolerance (PEP Therapy) none   Respiratory Evaluation   $ Care Plan Tech Time 15 min

## 2025-06-16 NOTE — PROGRESS NOTES
"Pharmacokinetic Initial Assessment: IV Vancomycin    Assessment/Plan:    Initiate intravenous vancomycin with loading dose of 1250 mg once followed by a maintenance dose of vancomycin 1000mg IV every 12 hours  Desired empiric serum trough concentration is 15 to 20 mcg/mL  Draw vancomycin trough level 60 min prior to fourth dose on 6/18 at approximately 0800  Pharmacy will continue to follow and monitor vancomycin.      Please contact pharmacy at extension [phone] with any questions regarding this assessment.     Thank you for the consult,   ERNST GANN       Patient brief summary:  Jordin Allen Jr. is a 79 y.o. male initiated on antimicrobial therapy with IV Vancomycin for treatment of suspected pneumonia    Drug Allergies:   Review of patient's allergies indicates:   Allergen Reactions    Brilinta [ticagrelor] Itching    Lisinopril      Other reaction(s): cough    Metoprolol succinate Rash       Actual Body Weight:   92.6kg    Renal Function:   Estimated Creatinine Clearance: 87 mL/min (based on SCr of 0.8 mg/dL).,     Dialysis Method (if applicable):  N/A    CBC (last 72 hours):  Recent Labs   Lab Result Units 06/16/25  0429   WBC K/uL 13.54*   HGB gm/dL 10.6*   HCT % 33.8*   Platelet Count K/uL 125*   Monocyte % % 1.0*       Metabolic Panel (last 72 hours):  Recent Labs   Lab Result Units 06/16/25  0429   Sodium mmol/L 142   Potassium mmol/L 3.8   Chloride mmol/L 106   CO2 mmol/L 27   Glucose mg/dL 137*   BUN mg/dL 21   Creatinine mg/dL 0.8   Albumin g/dL 2.0*   Bilirubin Total mg/dL 0.6   ALP unit/L 104   AST unit/L 25   ALT unit/L 75*   Magnesium  mg/dL 1.8   Phosphorus Level mg/dL 2.4*       Drug levels (last 3 results):  No results for input(s): "VANCOMYCINRA", "VANCORANDOM", "VANCOMYCINPE", "VANCOPEAK", "VANCOMYCINTR", "VANCOTROUGH" in the last 72 hours.    Microbiologic Results:  Microbiology Results (last 7 days)       Procedure Component Value Units Date/Time    Culture, Respiratory with Gram Stain " [[id number]]     Order Status: Sent Specimen: Respiratory

## 2025-06-16 NOTE — NURSING
Patient resting in room. Patient put back on Diltiazem continuous at 5mg/hr due to increased HR. VSS at this time, pt has c/o shoulder pain, pain medications given as ordered to relieve pain. Bed locked and in lowest position, call light within reach. Patient instructed to call nurse for assistance when needed. Will continue to monitor.

## 2025-06-16 NOTE — PROGRESS NOTES
This patient is on Zosyn 4.5 gm IVPB every 8 hours   (admitted on)  TX:LRI  CX: E. Coli  Duration of Therapy: 10 days  Stop Date: 6/19     This patient is on Vanco 1g q12hrs IVPB (started on 6/16)  TX: Pneumonia  CX: pending  Duration of Therapy: TBD  Stop Date: TBD

## 2025-06-16 NOTE — NURSING
Pt with sustained HR >150, [id number]%, /88.  Dr Guthrie ordered Diltiazem 10mg ivp x1 dose, option not avail for nursing, Dr Guthrie notified and put the order in.  Dose given at 0730, hr still >150.  Pt awake/alert.

## 2025-06-16 NOTE — PLAN OF CARE
Problem: Infection  Goal: Absence of Infection Signs and Symptoms  Outcome: Progressing     Problem: Gas Exchange Impaired  Goal: Optimal Gas Exchange  Outcome: Progressing     Problem: Breathing Pattern Ineffective  Goal: Effective Breathing Pattern  Outcome: Progressing

## 2025-06-16 NOTE — PROGRESS NOTES
[facility]  Wound Care Progress Note  Attending Physician: Bernardino Guthrie MD  Primary Care Physician: Sierra Landry MD  Date of Admit: 6/3/2025      Chief Complaint    Wound to left leg   History of Present Illness:     Per attending   HPI: Jordin Allen Jr. 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 Patient has not felt this chest pain since the episode. Currently no chest pain. Does endorse SOB and cough. Reports he is not coughing up anything because he feels like it is stuck in his chest. Denies fever, chills, chest pain, palpitations,  abdominal pain, n/v/d, dysuria, headaches, or any other symptoms at this time.      In ED: AF, VSS on 6-8L HFNC. CBC with leukocytosis. Hgb 11.9. CMP notable for Na 135, mild elevation in ALT. Phos 2.4. BNP 62. HS trop 12. LA 0.9. covid/flu negative. ABG with pH 7.499, pCO2 56.3, pO2 79, HCO3 43.8. Blood cultures pending. CTA chest with no evidence of acute PE, mild ill-defined airspace opacities in the dependent portions of the right upper and middle lobes when compared to 05/22/2025, nonspecific.  This could be due to atelectasis, aspiration and/or pneumonia 5 mm left upper lobe nodule, unchanged from 05/22/2025 but new when compared to 03/05/2025.  A follow-up chest CT in 1 year could be considered if the patient is at increased risk of developing lung cancer, such as from a smoking history. S/p duoneb, dexamethasone 10mg inj, vanc and zosyn in ED. Admitted to  for further evaluation.     6/4/2025 Patient seen for wound to left leg  Patient seen lying in bed. No acute distress. Wound care done with nurse. No issues or complaints at  time. Cont POC Plan to f/u on 6/5 6/6/2025 Patient seen lying in bed. No acute distress. Wound care done with nurse. No issues or complaints at time. Cont POC Plan to f/u on 6/9 6/12/2025 Patient seen lying in bed. Patient returned from acute stay. No acute distress. Wound care done with nurse. No issues or complaints at time. Cont POC Plan to f/u on 6/13 6/13/2925 Patient seen lying in bed. No acute distress. Wound care done with nurse. No issues or complaints at time. Cont POC Plan to f/u on  6/16 6/16/2025 Patient seen lying in bed. No acute distress. Wound care done with nurse. No issues or complaints at time. Cont POC Plan to f/u on  6/17      Past medical history:     Past Medical History:   Diagnosis Date    Benign neoplasm of colon 10/01/2013    Bronchitis chronic     CAD (coronary artery disease) 10/31/2013    Cataract 2008    COPD (chronic obstructive pulmonary disease)     Emphysema of lung     Encounter for preventive health examination 03/21/2018    GERD (gastroesophageal reflux disease)     Glaucoma 2010    Hearing loss 2015    Heart attack 2013    Hx of colonic polyps     Hypertension     NAFLD (nonalcoholic fatty liver disease) 03/27/2017    SHOLA on CPAP     RLS (restless legs syndrome)     Screen for colon cancer 08/30/2013     Past medical history was reviewed and was otherwise negative except as above.    Past surgical history:     Past Surgical History:   Procedure Laterality Date    bilateral foot surgery  1993    CARDIAC CATHETERIZATION  2013    x1    CATARACT EXTRACTION, BILATERAL      COLON SURGERY  2013    Ace Mott MD    COLONOSCOPY N/A 03/21/2018    Procedure: COLONOSCOPY;  Surgeon: Terry Mott MD;  Location: [address]);  Service: Endoscopy;  Laterality: N/A;    COLONOSCOPY N/A 04/12/2019    Procedure: COLONOSCOPY;  Surgeon: John Mantilla MD;  Location: [facility] ([address] [place] [address]);  Service: Endoscopy;  Laterality: N/A;    COLONOSCOPY N/A 05/31/2022    Procedure:  COLONOSCOPY;  Surgeon: MEDINA Farris MD;  [facility]  Service: Endoscopy;  Laterality: N/A;  fully vacc-inst portal-tb    ENDOBRONCHIAL ULTRASOUND Bilateral 04/30/2024    Procedure: ENDOBRONCHIAL ULTRASOUND (EBUS);  Surgeon: Naveed Aguero MD;  Location: [facility] OR;  Service: Pulmonary;  Laterality: Bilateral;    EYE SURGERY  2011    scrotal abscess removal      TYMPANOSTOMY TUBE PLACEMENT  2017     Past surgical history was reviewed and was noncontributory except as above.    Allergies:     Review of patient's allergies indicates:   Allergen Reactions    Brilinta [ticagrelor] Itching    Lisinopril      Other reaction(s): cough    Metoprolol succinate Rash     Allergies were reviewed and were negative except as above.    Home Medications:     Prior to Admission medications    Medication Sig Start Date End Date Taking? Authorizing Provider   acetaminophen (TYLENOL) 500 MG tablet Take 500 mg by mouth 2 (two) times daily as needed for Pain.    Provider, Historical   albuterol (ACCUNEB) 0.63 mg/3 mL Nebu Inhale 3 mLs (0.63 mg total) by nebulization every 6 (six) hours as needed (if symptoms failed to improve with inhaler). Rescue 12/10/24   Bienvenido Ward MD   albuterol (PROVENTIL/VENTOLIN HFA) 90 mcg/actuation inhaler Inhale 2 puffs into the lungs every 4 (four) hours as needed for Wheezing. 12/10/24   Bienvenido Ward MD   amitriptyline (ELAVIL) 25 MG tablet Take 1 tablet (25 mg total) by mouth once daily.  Patient taking differently: Take 25 mg by mouth every evening. 9/3/24   Sierra Landry MD   aspirin (ECOTRIN) 81 MG EC tablet Take 81 mg by mouth every morning.    Provider, Historical   atorvastatin (LIPITOR) 80 MG tablet Take 1 tablet (80 mg total) by mouth in the morning. 4/21/25   Sierra Landry MD   BETA-CAROTENE,A, W-C & E/MIN (OCUVITE ORAL) Take 1 capsule by mouth once daily.     Provider, Historical   budesonide-glycopyr-formoterol (BREZTRI AEROSPHERE) 160-9-4.8  mcg/actuation HFAA Inhale 2 puffs into the lungs 2 (two) times a day. 12/10/24   Bienvenido Ward MD   dexAMETHasone (DECADRON) 4 MG Tab Take 1 tablet (4 mg total) by mouth 2 (two) times daily.  Patient not taking: Reported on 5/16/2025 3/26/25   Bienvenido Henson MD   echinacea 400 mg Cap Take 1 capsule by mouth once daily.     Provider, Historical   fluticasone propionate (FLONASE) 50 mcg/actuation nasal spray Spray 2 sprays (100 mcg total) in Each Nostril once daily. 10/23/24   Caren Shah MD   furosemide (LASIX) 20 MG tablet Take 1 tablet (20 mg total) by mouth once daily. 4/15/25 4/15/26  Bienvenido Ward MD   ibuprofen (ADVIL,MOTRIN) 200 MG tablet Take 200 mg by mouth every 8 (eight) hours as needed for Pain. 2/21/25   Provider, Historical   latanoprost 0.005 % ophthalmic solution Instill 1 drop into both eyes every night at bedtime 1/30/25      levETIRAcetam (KEPPRA) 500 MG Tab Take 1 tablet (500 mg total) by mouth 2 (two) times daily. 3/24/25 3/24/26  Janene Nelson PA-C   losartan (COZAAR) 100 MG tablet Take 1 tablet (100 mg total) by mouth once daily. 12/19/24   Prince Ba MD   nitroGLYCERIN (NITROSTAT) 0.4 MG SL tablet Place 1 tablet (0.4 mg total) under the tongue every 5 (five) minutes as needed. 5/6/24   Prince Ba MD   omeprazole (PRILOSEC) 20 MG capsule Take 20 mg by mouth once daily.    Provider, Historical   polyethylene glycol (GLYCOLAX) 17 gram/dose powder Take 17 grams by mouth every day for constipation prevention 5/20/25      roflumilast (DALIRESP) 500 mcg Tab Take 500 mcg by mouth every morning. 2/22/25   Provider, Historical   sennosides (SENNA ORAL) Take 1 tablet by mouth daily as needed (Constipation).    Provider, Historical   traZODone (DESYREL) 50 MG tablet Take 1 tablet (50 mg total) by mouth every evening. 1/14/25   Lou Muro NP       Family History:     Family History   Problem Relation Name Age of Onset    Heart disease Mother jc bhardwajer      "Heart attack Mother jc deutsch     Hypertension Mother jc deutsch     No Known Problems Sister      No Known Problems Daughter 2     Diabetes Maternal Grandmother imtiaz jennings     Asthma Neg Hx      Emphysema Neg Hx      Prostate cancer Neg Hx      Cirrhosis Neg Hx       Family history was reviewed and was otherwise negative except as above.    Social History:   Social History[1]  Social history was reviewed and was otherwise negative except as above    Review of Systems   A 10 point review of systems was conducted and was negative except as described in the HPI.  Patient denies Chest Pain.  Patient denies shortness of breath.  Patient denies fevers.  Patient denies chills.    Physical Examination:   Triage: BP: 123/77  Pulse: 101  Temp: 97.6 °F (36.4 °C)  Resp: 18  Height: 5' 9" (175.3 cm)  Weight: 86.8 kg (191 lb 5.8 oz) (witness by SALO Escalante and C.C. RT.)  BMI (Calculated): 28.2  SpO2: 97 %  Exam: BP (!) 126/58   Pulse 71   Temp 97.1 °F (36.2 °C) (Axillary)   Resp 18   Ht 5' 9" (1.753 m)   Wt 92.6 kg (204 lb 2.3 oz)   SpO2 100%   BMI 30.15 kg/m²     Vitals  BP  Min: 104/80  Max: 154/70  Temp  Av.9 °F (36.6 °C)  Min: 97.1 °F (36.2 °C)  Max: 98.5 °F (36.9 °C)  Pulse  Av.3  Min: 71  Max: 153  Resp  Av.3  Min: 18  Max: 33  SpO2  Av.9 %  Min: 91 %  Max: 100 %  Weight  Av.6 kg (204 lb 2.3 oz)  Min: 92.6 kg (204 lb 2.3 oz)  Max: 92.6 kg (204 lb 2.3 oz)    General Pt alert and oriented, not in apparent distress, good nutrition  Eyes: No conjunctival erythema; normal appearing lids  HEENT: Normocephalic atraumatic, mucous membranes moist  Cardiovascular: No edema  Respiratory: Non-labored, no accessory muscle use, no wheezing  Abdomen: Soft, non tender, non distended, no rebound  Musculoskeletal: no clubbing or cyanosis of digits  Neuro: Grossly intact, weakness upper/lower extremities  Psych: Good mood, full affect, good insight  Skin:    Wound 25 0233 Moisture " associated dermatitis Left dorsal Foot   Date First Assessed/Time First Assessed: 05/19/25 0233   Present on Original Admission: Yes  Primary Wound Type: Moisture associated dermatitis  Side: Left  Orientation: dorsal  Location: Foot  Is this injury device related?: No   Wound Image                            Dressing Appearance Dry   Drainage Amount None   Drainage Characteristics/Odor No odor   Appearance Dry   Periwound Area Intact   Care Wound cleanser   Dressing Applied  (Lac-hydrin 12%)        Wound 06/04/25 1030 Moisture associated dermatitis Right lateral Abdomen   Date First Assessed/Time First Assessed: 06/04/25 1030   Present on Original Admission: Yes  Primary Wound Type: Moisture associated dermatitis  Side: Right  Orientation: lateral  Location: Abdomen   Wound Image    Dressing Appearance Open to air   Drainage Amount None   Red (%), Wound Tissue Color 100 %   Periwound Area Moist   Care Wound cleanser   Dressing    (applied Triad paste, open to air)        Wound 06/04/25 1030 Moisture associated dermatitis Right Groin   Date First Assessed/Time First Assessed: 06/04/25 1030   Present on Original Admission: Yes  Primary Wound Type: Moisture associated dermatitis  Side: Right  Location: Groin  Is this injury device related?: No   Wound Image    Dressing Appearance Open to air;No dressing   Drainage Amount None   Red (%), Wound Tissue Color 100 %   Periwound Area Intact   Care Cleansed with:;Wound cleanser   Dressing    (applied Triad paste, open to air)        Wound 06/04/25 1030 Moisture associated dermatitis Left Groin   Date First Assessed/Time First Assessed: 06/04/25 1030   Present on Original Admission: Yes  Primary Wound Type: Moisture associated dermatitis  Side: Left  Location: Groin   Wound Image    Dressing Appearance Open to air   Drainage Amount None   Appearance Pink   Red (%), Wound Tissue Color 100 %   Periwound Area Moist   Care Cleansed with:;Wound cleanser   Dressing    (applied  Triad paste, open to air)        Wound 06/04/25 1030 Moisture associated dermatitis Sacral spine   Date First Assessed/Time First Assessed: 06/04/25 1030   Present on Original Admission: Yes  Primary Wound Type: (c) Moisture associated dermatitis  Location: (c) Sacral spine   Wound Image                            Dressing Appearance Open to air   Drainage Amount None   Drainage Characteristics/Odor No odor   Red (%), Wound Tissue Color 100 %   Periwound Area Moist   Care Wound cleanser   Dressing Applied  (applied Triad paste, open to air)        Wound 06/09/25 Pressure Injury Nose   Date First Assessed: 06/09/25   Present on Original Admission: Yes  Primary Wound Type: Pressure Injury  Location: Nose   Wound Image    Dressing Appearance Open to air;Dry   Drainage Amount None   Appearance Pink;Maroon   Red (%), Wound Tissue Color 100 %   Periwound Area Intact;Dry   Wound Length (cm) 0 cm   Wound Width (cm) 0 cm   Wound Depth (cm) 0 cm   Wound Volume (cm^3) 0 cm^3   Wound Surface Area (cm^2) 0 cm^2   Care Cleansed with:;Wound cleanser   Skin Interventions   Device Skin Pressure Protection pressure points protected;skin-to-device areas padded;tubing/devices free from skin contact   Pressure Reduction Devices pressure-redistributing mattress utilized;specialty bed utilized   Skin Protection hydrocolloids used       Laboratory:  Most Recent Data:  CBC:   Lab Results   Component Value Date    WBC 13.54 (H) 06/16/2025    HGB 10.6 (L) 06/16/2025    HCT 33.8 (L) 06/16/2025     (L) 06/16/2025    MCV 91 06/16/2025    RDW 16.9 (H) 06/16/2025     BMP:   Lab Results   Component Value Date     06/16/2025    K 3.8 06/16/2025     06/16/2025    CO2 27 06/16/2025    BUN 21 06/16/2025    CREATININE 0.8 06/16/2025     (H) 06/16/2025    CALCIUM 8.7 06/16/2025    MG 1.8 06/16/2025    PHOS 2.4 (L) 06/16/2025     LFTs:   Lab Results   Component Value Date    PROT 5.0 (L) 06/16/2025    ALBUMIN 2.0 (L) 06/16/2025  "   BILITOT 0.6 06/16/2025    AST 25 06/16/2025    ALKPHOS 104 06/16/2025    ALT 75 (H) 06/16/2025     Coags:   Lab Results   Component Value Date    INR 1.0 02/07/2025     FLP:   Lab Results   Component Value Date    CHOL 101 (L) 02/07/2025    HDL 35 (L) 02/07/2025    LDLCALC 53.2 (L) 02/07/2025    TRIG 64 02/07/2025    CHOLHDL 34.7 02/07/2025     DM:   Lab Results   Component Value Date    HGBA1C 5.7 (H) 03/06/2024    HGBA1C 5.6 10/12/2023    HGBA1C 5.8 05/30/2013    GLUF 103 05/19/2014    LDLCALC 53.2 (L) 02/07/2025    CREATININE 0.8 06/16/2025     Thyroid:   Lab Results   Component Value Date    TSH 0.261 (L) 06/01/2025    FREET4 0.99 06/01/2025    Y9FDYOI 9.1 03/06/2024    C4LOHXT 86 03/07/2017     Anemia:   Lab Results   Component Value Date    IRON 28 (L) 08/29/2024    TIBC 266 08/29/2024    FERRITIN 2,080 (H) 08/29/2024    RTUOTSND40 474 08/29/2024    FOLATE 4.5 08/29/2024     Cardiac:   Lab Results   Component Value Date    TROPONINI 10.800 (H) 06/08/2025    BNP 71 06/16/2025     Urinalysis:   Lab Results   Component Value Date    COLORU Yellow 06/09/2025    SPECGRAV 1.010 06/09/2025    NITRITE Negative 06/09/2025    KETONESU Trace (A) 02/07/2025    UROBILINOGEN Negative 06/09/2025    WBCUA 4 06/09/2025       Trended Lab Data:  Recent Labs   Lab 06/11/25  0441 06/11/25  0442 06/13/25  0413 06/16/25  0429   WBC 11.82  --  11.92 13.54*   HGB 10.9*  --  10.0* 10.6*   *  --  138* 125*   MCV 88  --  88 91   RDW 16.0*  --  15.7* 16.9*   NA  --  135* 143 142   K  --  3.1* 4.0 3.8   CL  --  94* 102 106   CO2  --  30* 31* 27   BUN  --  34* 37* 21   GLU  --  162* 167* 137*   CALCIUM  --  8.3* 8.6* 8.7   PROT  --  5.1* 5.0* 5.0*   ALBUMIN  --  1.9* 2.0* 2.0*   BILITOT  --  0.5 0.3 0.6   AST  --  121* 43 25   ALKPHOS  --  116 130 104   ALT  --  157* 111* 75*       Trended Cardiac Data:  Recent Labs   Lab 06/16/25  1023   BNP 71       Micro Results  No components found for: "CBLOOD"  No components found for: " ""CURINE"  No components found for: "CRESPWSM"    Radiology:  Echo  Result Date: 6/11/2025    Left Ventricle: The left ventricle is normal in size. Ventricular mass is normal. Normal wall thickness. There is low normal systolic function with a visually estimated ejection fraction of 50 - 55%.   Inferolateral pseudodyskinesis   Right Ventricle: Right ventricle was not well visualized due to poor acoustic window. The right ventricle is normal in size Systolic function is mildly reduced.   IVC/SVC: Intermediate venous pressure at 8 mmHg.   RV function not well seen.     CTA Chest Non-Coronary (PE Studies)  Result Date: 6/10/2025  EXAMINATION: CTA CHEST NON CORONARY (PE STUDIES) CLINICAL HISTORY: r/o PE, signifcant tachycardia; TECHNIQUE: Low dose axial images, sagittal and coronal reformations were obtained from the thoracic inlet to the lung bases following the IV administration of 100 mL of Omnipaque 350.  Contrast timing was optimized to evaluate the pulmonary arteries.  MIP images were performed. COMPARISON: None FINDINGS: Pulmonary arteries:Pulmonary arteries are adequately enhanced.No filling defects to indicate pulmonary embolism. Thoracic soft tissues: Unremarkable. Aorta: Mild aneurysmal dilation of the ascending thoracic aorta to approximately 4.2 cm axial 256 of series 2. Heart: Normal size. No effusion. Nathalie/Mediastinum: No pathologic carolyn enlargement. Airways: Patent. Lungs/Pleura: Prominent emphysematous changes throughout the lungs. Mild interstitial infiltrate or scarring posterior right upper lobe. Mild left basilar atelectasis. Minimal left upper lobe ground-glass infiltrate. Esophagus: Unremarkable. Upper Abdomen: No abnormality of the partially imaged upper abdomen. Bones: Moderate chronic compression fracture superior endplate of L1. Aberrant right subclavian artery noted as an anatomic variant.     1. No evidence of pulmonary embolism. 2. Mild aneurysm dilation of the ascending thoracic aorta to " approximately 4.2 cm.  Recommend surveillance. 3. Emphysematous changes of the lungs. 4. Mild interstitial infiltrate or scarring in the posterior right upper lobe and minimal left upper lobe infiltrate.  Minimal pneumonitis is not excluded. 5. Aberrant right subclavian artery noted as an anatomic variant. 6. Moderate chronic compression fracture of the superior endplate of L1 Electronically signed by: Ryan Jackson Date:    06/10/2025 Time:    21:38    CTA Chest Non-Coronary (PE Studies)  Result Date: 6/9/2025  EXAMINATION: CTA CHEST NON CORONARY (PE STUDIES) CLINICAL HISTORY: Pulmonary embolism (PE) suspected, high prob; TECHNIQUE: During intravenous bolus injection of 100 ml of Omnipaque 350 contrast medium and using low dose technique, the chest was surveyed from above the pulmonary apices through the posterior costophrenic angles data was reconstructed for multiplanar images in axial, sagittal and coronal planes and for maximal intensity projection images in the the axial, sagittal and coronal planes. COMPARISON: Multiple priors, most recent CTA 05/22/2025 FINDINGS: Exam quality: Satisfactory. Pulmonary embolism: No acute or chronic pulmonary embolism. Central pulmonary arteries: Normal caliber. Aorta: Normal caliber.  Mild atherosclerosis.  Aberrant right subclavian artery. Heart: No right heart strain. Normal size. Coronary arteries: Multi-vessel calcific atherosclerosis of the coronary arteries. Pericardium: Normal. No effusion, thickening, or calcification. Support tubes and lines: None. Base of neck/thyroid: Normal. Lymph nodes: No supraclavicular, axillary, internal mammary, mediastinal, or hilar adenopathy. Esophagus: Normal. Pleura: No effusion, thickening, or calcification. Upper abdomen: Accessory splenule.  Fatty atrophy of the pancreas. Body wall: Unremarkable Airways: Normal. Lungs: Centrilobular and paraseptal emphysema.  Treatment changes of the left hilar region with associated volume loss and  architectural distortion, stable when compared to prior imaging.  Right bandlike scarring versus atelectasis.  Left lower lobe calcified granuloma.  Ill-defined airspace opacities present in the dependent portions of the right upper and middle lobes are new when compared to 05/22/2025.  A 4 mm nodule in the left upper lobe (series 3, image 257) is unchanged from 05/22/2025 but new when compared to 03/05/2025. Bones: Degenerative changes of the spine.  Height loss of the superioror endplate of L1, unchanged from 02/20/2025.  Unchanged sclerotic focus in the posterior elements of T9.     No evidence of acute pulmonary embolism. New mild ill-defined airspace opacities in the dependent portions of the right upper and middle lobes when compared to 05/22/2025, nonspecific.  This could be due to atelectasis, aspiration and/or pneumonia. 5 mm left upper lobe nodule, unchanged from 05/22/2025 but new when compared to 03/05/2025.  A follow-up chest CT in 1 year could be considered if the patient is at increased risk of developing lung cancer, such as from a smoking history. Additional findings as above. Electronically signed by resident: Madeline Iniguez Date:    06/09/2025 Time:    10:55 Electronically signed by: Anna Brown Date:    06/09/2025 Time:    11:55    X-Ray Chest AP Portable  Result Date: 6/9/2025  EXAMINATION: XR CHEST AP PORTABLE CLINICAL HISTORY: sob; TECHNIQUE: Single frontal view of the chest was performed. COMPARISON: 06/01/25 FINDINGS: Cardiac size is normal.  Lungs are clear with no significant infiltrate or vascular congestion.  No pleural fluid is seen.     See above Electronically signed by: Prince Ovalle MD Date:    06/09/2025 Time:    09:29    X-Ray Chest AP Portable  Result Date: 6/1/2025  EXAMINATION: XR CHEST AP PORTABLE CLINICAL HISTORY: palpitations; TECHNIQUE: Single frontal view of the chest was performed. COMPARISON: 05/26/2025. FINDINGS: The lungs are well expanded and clear. No focal  opacities are seen. The pleural spaces are clear. The cardiac silhouette is unremarkable. The visualized osseous structures are unremarkable.     No acute abnormality. Electronically signed by: Popeye Mckeon Date:    06/01/2025 Time:    22:33    X-Ray Chest AP Portable  Result Date: 5/26/2025  EXAMINATION: XR CHEST AP PORTABLE CLINICAL HISTORY: sob; FINDINGS: Chest one view AP portable. Heart size is normal.  Lungs are clear.  The bones are noncontributory.     No acute process seen. Electronically signed by: Alexander Blankenship MD Date:    05/26/2025 Time:    09:16    X-Ray Chest AP Portable  Result Date: 5/25/2025  EXAMINATION: XR CHEST AP PORTABLE CLINICAL HISTORY: chf; TECHNIQUE: Single frontal view of the chest was performed. COMPARISON: 05/22/2025 FINDINGS: The cardiomediastinal silhouette is stable in configuration noting calcification of the aorta..  There is no pleural effusion.  The trachea is midline.  The lungs are symmetrically expanded bilaterally with coarse interstitial attenuation, similar to the previous examination.  There is increased parenchymal attenuation projected over the medial aspect of the right lower lung zone, may reflect atelectasis, developing consolidation not excluded..  There is no pneumothorax.  The osseous structures are unchanged..     As above Electronically signed by: Artur Velazquez MD Date:    05/25/2025 Time:    19:17    Echo  Result Date: 5/23/2025    Tachycardia was present during the study   Erall the study quality was technically difficult. valves not well visualized.   Left Ventricle: The left ventricle is normal in size. Normal wall thickness. Normal wall motion. There is normal systolic function with a visually estimated ejection fraction of 65 - 70%. There is normal diastolic function. Normal left ventricular filling pressure.   Right Ventricle: The right ventricle is normal in size Wall thickness is normal. Systolic function is normal.   Left Atrium: Left atrium was not  well visualized.   Right Atrium: Not well visualized due to poor acoustic window.   Aortic Valve: Not well visualized due to poor acoustic window.   Mitral Valve: Not well visualized due to poor acoustic window.   Tricuspid Valve: Not well visualized due to poor acoustic window.   Pulmonic Valve: Not well visualized due to poor acoustic window.   Aorta: The aortic root is normal in size measuring 2.79 cm. The ascending aorta is normal in size measuring 2.7 cm.   IVC/SVC: Normal venous pressure at 3 mmHg.   Pericardium: There is no pericardial effusion.     US Lower Extremity Veins Bilateral  Result Date: 5/22/2025  EXAMINATION: US LOWER EXTREMITY VEINS BILATERAL CLINICAL HISTORY: dvt; TECHNIQUE: Duplex and color flow Doppler and dynamic compression was performed of the bilateral lower extremity veins was performed. COMPARISON: 05/15/2025. FINDINGS: Right thigh veins: The common femoral, femoral, popliteal, upper greater saphenous, and deep femoral veins are patent and free of thrombus. The veins are normally compressible and have normal phasic flow and augmentation response. Right calf veins: The visualized calf veins are patent. Left thigh veins: The common femoral, femoral, popliteal, upper greater saphenous, and deep femoral veins are patent and free of thrombus. The veins are normally compressible and have normal phasic flow and augmentation response. Left calf veins: The visualized calf veins are patent. Miscellaneous: None     No evidence of deep venous thrombosis in either lower extremity. Electronically signed by: Vidal Arroyo MD Date:    05/22/2025 Time:    18:06    CTA Chest Non-Coronary (PE Studies)  Result Date: 5/22/2025  EXAMINATION: CTA CHEST NON CORONARY (PE STUDIES) CLINICAL HISTORY: Pulmonary embolism (PE) suspected, unknown D-dimer; TECHNIQUE: Low dose axial images, sagittal and coronal reformations were obtained from the thoracic inlet to the lung bases following the IV administration of 100 mL  of Omnipaque 350.  Contrast timing was optimized to evaluate the pulmonary arteries.  MIP images were performed. COMPARISON: Radiograph 05/22/2025, CT abdomen and pelvis 05/19/2025.  CT chest 03/05/2025, CTA chest 03/19/2024, CTA chest 12/28/2024 FINDINGS: The structures at the base of the neck are unremarkable.  No significant mediastinal lymphadenopathy.  The heart is not enlarged.  The thoracic aorta tapers normally noting aberrant right subclavian.  There is atherosclerotic calcification of the aorta and in the distribution of the coronary arteries.  The visualized portions of the spleen and adrenal glands are unremarkable.  There is fatty atrophy of the pancreas.  The gallbladder is distended without wall thickening.  The liver is unremarkable.  There is bilateral perinephric fat stranding, correlation with urinalysis recommended. Motion artifact limits evaluation of the airways and pulmonary parenchyma.  There are aerated secretions layering posteriorly within the upper trachea, placing patient at risk for aspiration.  The airways are otherwise patent.  There is bilateral pulmonary emphysematous change.  There is scattered interlobular septal thickening.  There is a nodular focus within the anterior aspect of the left upper lobe measuring in conglomerate 5-6 mm not seen on the previous exam.  There is bilateral basilar dependent atelectasis.  No large focal consolidation.  No pneumothorax. There are post treatment changes within the left hilar region, less conspicuous on current exam. Bolus timing is adequate for evaluation of pulmonary thromboembolism.  Allowing for artifact from motion, no convincing pulmonary arterial filling defects to the level of the segmental branches bilaterally to suggest pulmonary thromboembolism. There is osteopenia.  There are degenerative changes of the spine.  No significant axillary lymphadenopathy.  There is height loss involving the superior endplate of L1 new since  examination 03/05/2025, correlation with any focal tenderness recommended.  There is a sclerotic focus within the posterior elements of T9 stable.     1. Allowing for motion artifact, no convincing pulmonary thromboembolism.  Correlation of these findings with D-dimer and/or lower extremity ultrasound as warranted. 2. Pulmonary emphysematous change noting superimposed mild edema.  There is a nodular focus within the anterior aspect of the left upper lobe, not convincingly seen on the previous examination.  Developing infectious or inflammatory process is a consideration, continued follow-up is recommended to exclude discrete nodule in the setting of malignancy. 3. Aerated secretion within the proximal trachea, places patient at risk for aspiration. 4. Endplate compression deformity of L1, new since examination 03/05/2025 however stable from examination 05/19/2025. 5. Please see above for several additional findings. Electronically signed by: Artur Velazquez MD Date:    05/22/2025 Time:    17:21    X-Ray Foot Complete Left  Result Date: 5/22/2025  EXAMINATION: XR FOOT COMPLETE 3 VIEW LEFT CLINICAL HISTORY: .  Cellulitis, unspecified TECHNIQUE: AP, lateral and oblique views of the left foot were performed. COMPARISON: None FINDINGS: Three views left foot. There is osteopenia.  There are degenerative changes of the foot.  There is remote fracture of the fifth metatarsal.  Bandaging material overlies the lateral aspect of the foot.  No radiopaque foreign body.  There are degenerative changes of the calcaneus.  There is edema primarily about the dorsal aspect of the foot.     1. No convincing acute displaced fracture or dislocation of the foot noting diffuse edema particularly along the dorsal aspect.  Correlation is advised. Electronically signed by: Artur Velazquez MD Date:    05/22/2025 Time:    14:46    X-Ray Chest AP Portable  Result Date: 5/22/2025  EXAMINATION: XR CHEST AP PORTABLE CLINICAL HISTORY: CHF;  TECHNIQUE: Single frontal view of the chest was performed. COMPARISON: 02/07/2025 FINDINGS: The cardiomediastinal silhouette is not enlarged noting calcification of the aorta..  There is no pleural effusion.  The trachea is midline.  The lungs are symmetrically expanded bilaterally with coarse interstitial attenuation bilaterally noting scattered regions of bandlike atelectasis, similar to the previous examination.  No large focal consolidation seen.  There is no pneumothorax.  The osseous structures are remarkable for degenerative change..     1. Chronic appearing interstitial findings, superimposed edema remains a consideration.  No large focal consolidation. Electronically signed by: Artur Velazquez MD Date:    05/22/2025 Time:    13:15    CT Abdomen Pelvis With IV Contrast NO Oral Contrast  Result Date: 5/19/2025  EXAMINATION: CT ABDOMEN PELVIS WITH IV CONTRAST CLINICAL HISTORY: Epigastric pain;SBO rule out; TECHNIQUE: Low dose axial images, sagittal and coronal reformations were obtained from the lung bases to the pubic symphysis following the IV administration of 75 mL of Omnipaque 350 COMPARISON: CT of the chest, abdomen, and pelvis 03/05/2020 FINDINGS: Lower chest: Partially imaged cardiac valvular calcifications.  Atelectasis at the lung bases.  Patchy airspace consolidation in the medial basilar right lower lobe. Liver: Normal contour.  Subcentimeter hypodense lesion right hepatic lobe too small to definitely characterize. Gallbladder and bile ducts: Somewhat hydropic appearance of the gallbladder. No definite radiodense gallstones or pericholecystic inflammation.  No concerning biliary ductal dilatation. Pancreas: Unremarkable. Spleen: Normal contour.  At least 2 accessory splenule suggested. Adrenals: Unremarkable. Kidneys: Unchanged appearance of the kidneys compared to 03/05/2025 CT.  Unchanged presumed cysts in the left kidney.  Additional subcentimeter hypodense lesions are too small to definitely  characterize.  Nonspecific bilateral perinephric stranding again seen. Lymph nodes: No abdominal or pelvic lymphadenopathy. Bowel and mesentery: Postoperative changes of the bowel again noted in keeping with partial colectomy.  Colonic diverticulosis.  Relatively diffuse fluid distended colonic loops.  Short segment focal narrowing of the sigmoid colon (axial series 2, image 110).  Small bowel normal caliber.Appendix not seen and may be surgically absent. Abdominal aorta: Nonaneurysmal.  Moderate to heavy atherosclerosis. Inferior vena cava: Unremarkable. Free fluid or free air: None. Pelvis: Unremarkable. Body wall: Small fat containing umbilical and infraumbilical ventral hernias. Bones: No acute change.  Suspect osseous demineralization.  No change in configuration of recent burst type fracture of the L1 vertebra compared to dedicated CT of the lumbar spine performed 05/14/2025.     1. Relatively diffusely dilated fluid-filled colonic loops, nonspecific.  Findings could reflect a nonspecific diarrheal illness, possibly infectious or noninfectious inflammatory in nature.  Noting this, there is an apparent short segment focal narrowing of the sigmoid colon, which could reflect peristalsis, however stricture or neoplasm difficult to exclude in this region.  Attention on CT follow-up recommended.  Additionally, consider colonoscopy for further characterization, if not recently performed. 2. Patchy airspace consolidation in the basilar right lower lobe, possibly infectious or noninfectious inflammatory in etiology.  Correlation advised. 3. Additional details of chronic and incidental findings, as provided in the body of the report. Electronically signed by: Bolivar Gonsales Date:    05/19/2025 Time:    11:31      Recent Results (from the past 24 hours)   Magnesium    Collection Time: 06/16/25  4:29 AM   Result Value Ref Range    Magnesium  1.8 1.6 - 2.6 mg/dL   Comprehensive Metabolic Panel    Collection Time: 06/16/25   4:29 AM   Result Value Ref Range    Sodium 142 136 - 145 mmol/L    Potassium 3.8 3.5 - 5.1 mmol/L    Chloride 106 95 - 110 mmol/L    CO2 27 23 - 29 mmol/L    Glucose 137 (H) 70 - 110 mg/dL    BUN 21 8 - 23 mg/dL    Creatinine 0.8 0.5 - 1.4 mg/dL    Calcium 8.7 8.7 - 10.5 mg/dL    Protein Total 5.0 (L) 6.0 - 8.4 gm/dL    Albumin 2.0 (L) 3.5 - 5.2 g/dL    Bilirubin Total 0.6 0.1 - 1.0 mg/dL     40 - 150 unit/L    AST 25 11 - 45 unit/L    ALT 75 (H) 10 - 44 unit/L    Anion Gap 9 8 - 16 mmol/L    eGFR >60 >60 mL/min/1.73/m2   Phosphorus    Collection Time: 06/16/25  4:29 AM   Result Value Ref Range    Phosphorus Level 2.4 (L) 2.7 - 4.5 mg/dL   CBC with Differential    Collection Time: 06/16/25  4:29 AM   Result Value Ref Range    WBC 13.54 (H) 3.90 - 12.70 K/uL    RBC 3.73 (L) 4.60 - 6.20 M/uL    HGB 10.6 (L) 14.0 - 18.0 gm/dL    HCT 33.8 (L) 40.0 - 54.0 %    MCV 91 82 - 98 fL    MCH 28.4 27.0 - 31.0 pg    MCHC 31.4 (L) 32.0 - 36.0 g/dL    RDW 16.9 (H) 11.5 - 14.5 %    Platelet Count 125 (L) 150 - 450 K/uL    MPV 10.2 9.2 - 12.9 fL    Nucleated RBC 0 <=0 /100 WBC   Manual Differential    Collection Time: 06/16/25  4:29 AM   Result Value Ref Range    Gran # (ANC) 13.3 K/uL    Segmented Neutrophil % 97.0 (H) 38.0 - 73.0 %    Bands % 1.0 %    Monocyte % 1.0 (L) 4.0 - 15.0 %    Metamyelocyte % 1.0 %    Anisocytosis Slight     Poik Slight     Polychromasia Occasional     Ovalocytes Occasional     Spherocyte Occasional    EKG 12-lead    Collection Time: 06/16/25  8:16 AM   Result Value Ref Range    QRS Duration 70 ms    [facility] QTC Calculation 438 ms   EKG 12-lead    Collection Time: 06/16/25  8:20 AM   Result Value Ref Range    QRS Duration 68 ms    [facility] QTC Calculation 444 ms   BNP    Collection Time: 06/16/25 10:23 AM   Result Value Ref Range    BNP 71 0 - 99 pg/mL     A1C   Lab Results   Component Value Date    HGBA1C 5.7 (H) 03/06/2024         Assessment/Plan:     Cellulitis to Left lower leg -resolved  Dermatitis to  left foot   -wash with bath wipes apply lac-hydrin daily per bedside nurse     MASD to Sacrum and Buttocks   Wound care clean with bath wipes apply Triad daily  Turn patient every 2 hours    Rash to:  RIGHT & LEFT GROIN  RIGHT & LEFT ABDOMEN  -clean with bath wipes apply antifungal powder daily per bedside nurse     Pressure wound stage I to right top ankle -resolved  -leave JOSSUE monitor     Decreased Mobility   -Patient with decreased bed mobility therefore patient is at high risk for developing wounds and deterioration of any current wounds. Patient needs frequent turning.     Nutrition :  Promote good nutrition to for improved wound healing.      Off-loading:   Follow facility bed policy for proper bed surface   Offload heels per facility protocol   Turn every 2 hours   Use Wheelchair Cushion     Patient Treatment Goals:  Short Term Goals  The wound base will be 25% .,demonstrate 25% more granulation tissue.  Short Term Goals  Patient wound status will improve/maintain without exacerbation infection of deterioration.  Wound Healing  Patient will have a decrease in wound size by 20% in 4 weeks.  Clinical Goals  Prevention of Infection is important for wound healing  Functional Goals:  The patient will achieve proper turning schedule.    -cont medical management     High Risk Because  -Chronic illness with SEVERE exacerbation, progression, or side effects of treatment.  -Acute or chronic illness or injury that poses a threat to life or bodily function    Notify Sheree Tao NP, or wound care RN if any new issues arise or if there is deterioration in documented wounds.    Sheree Tao FNP-C                 [1]   Social History  Tobacco Use    Smoking status: Former     Current packs/day: 0.00     Average packs/day: 1 pack/day for 40.0 years (40.0 ttl pk-yrs)     Types: Cigarettes     Start date: 8/15/1972     Quit date: 8/15/2012     Years since quittin.8     Passive exposure: Never    Smokeless  tobacco: Never   Substance Use Topics    Alcohol use: Yes     Alcohol/week: 3.0 standard drinks of alcohol     Types: 3 Cans of beer per week     Comment: maybe 2-3  beers weekly    Drug use: No

## 2025-06-16 NOTE — PT/OT/SLP PROGRESS
Physical Therapy Treatment    Patient Name:  Jordin Allen Jr.   MRN:  [id number]    Recommendations:     Discharge Recommendations: Moderate Intensity Therapy  Discharge Equipment Recommendations: to be determined by next level of care  Barriers to discharge: Decreased caregiver support    Assessment:     Jordin Allen Jr. is a 79 y.o. male admitted with a medical diagnosis of Acute on chronic respiratory failure with hypoxia.  He presents with the following impairments/functional limitations: weakness, impaired balance, decreased safety awareness, impaired endurance, pain, impaired cognition, impaired self care skills, decreased coordination, decreased ROM, impaired functional mobility, decreased upper extremity function, decreased lower extremity function .    Rehab Prognosis: Good; patient would benefit from acute skilled PT services to address these deficits and reach maximum level of function.    Recent Surgery: * No surgery found *      Plan:     During this hospitalization, patient to be seen 5 x/week to address the identified rehab impairments via therapeutic exercises, therapeutic activities, gait training and progress toward the following goals:    Plan of Care Expires:       Subjective     Chief Complaint: Pt c/o 5/10 posterior neck pain.  Patient/Family Comments/goals: Pt wants to get better and return home.  Pain/Comfort:  Pain Rating 1: 5/10  Location 1: neck  Pain Addressed 1: Reposition, Cessation of Activity, Nurse notified  Pain Rating Post-Intervention 1: 5/10      Objective:     Communicated with PT prior to session.  Patient found HOB elevated with telemetry, blood pressure cuff, pulse ox (continuous), peripheral IV, oxygen upon PT entry to room.     General Precautions: Standard, fall  Orthopedic Precautions: spinal precautions  Braces: TLSO  Respiratory Status: trach collar 5L O2 @28%     Functional Mobility:  Bed Mobility:  Rolling Left:  total assistance  Rolling Right: total  assistance  Scooting: dependence      AM-PAC 6 CLICK MOBILITY  Turning over in bed (including adjusting bedclothes, sheets and blankets)?: 2  Sitting down on and standing up from a chair with arms (e.g., wheelchair, bedside commode, etc.): 1  Moving from lying on back to sitting on the side of the bed?: 1  Moving to and from a bed to a chair (including a wheelchair)?: 1  Need to walk in hospital room?: 1  Climbing 3-5 steps with a railing?: 1  Basic Mobility Total Score: 7       Treatment & Education:  Pt performed/received bed mobility training rolling (L)(R) and scooting up higher in bed for proper positioning all as noted above. Pt performed/received (B) LE TE A/AAROM all available planes x 15 reps ea. Pt left in supine after tx with head elevated and call button in reach. Tx limited this AM per nursing 2* elevated heart rate.    Patient left HOB elevated with all lines intact, call button in reach, and bed alarm on..    GOALS:   Multidisciplinary Problems       Physical Therapy Goals          Problem: Physical Therapy    Goal Priority Disciplines Outcome Interventions   Physical Therapy Goal     PT, PT/OT Progressing    Description: Goals to be met by: DC     Patient will increase functional independence with mobility by performin. Supine to sit with [place]  2. Sit to supine with [place]  3. Sit to stand transfer with Supervision with RW  4. Gait  x 150 feet with Contact Guard Assistance using Rolling Walker.                          DME Justifications:  No DME recommended requiring DME justifications    Time Tracking:     PT Received On: 25  PT Start Time: 720     PT Stop Time: 735  PT Total Time (min): 15 min     Billable Minutes: Total Time 15  min     Treatment Type: Treatment  PT/PTA: PTA     Number of PTA visits since last PT visit: 2025

## 2025-06-16 NOTE — NURSING
Patient HR sustaining at 153-154 at start of shift, diltiazem given by charge RN. MD notified, current vitals are 131/61, , sats 93%-94% on 5 liters o2. Patient resting comfortably in bed, all morning medications given, pt tolerated well. Will continue to monitor.

## 2025-06-16 NOTE — PROGRESS NOTES
06/13/25 1400   Skin   Specialty Bed/Overlay Low air loss        Wound 05/19/25 0233 Moisture associated dermatitis Left dorsal Foot   Date First Assessed/Time First Assessed: 05/19/25 0233   Present on Original Admission: Yes  Primary Wound Type: Moisture associated dermatitis  Side: Left  Orientation: dorsal  Location: Foot  Is this injury device related?: No   Dressing Appearance Open to air   Drainage Amount None   Drainage Characteristics/Odor No odor   Appearance Dry   Care Wound cleanser   Dressing   (Lac-hydrin applied)        Wound 06/04/25 1030 Moisture associated dermatitis Right lateral Abdomen   Date First Assessed/Time First Assessed: 06/04/25 1030   Present on Original Admission: Yes  Primary Wound Type: Moisture associated dermatitis  Side: Right  Orientation: lateral  Location: Abdomen   Dressing Appearance Open to air   Drainage Amount None   Periwound Area Moist   Care Wound cleanser   Dressing   (Triad paste, open to air)        Wound 06/04/25 1030 Moisture associated dermatitis Right Groin   Date First Assessed/Time First Assessed: 06/04/25 1030   Present on Original Admission: Yes  Primary Wound Type: Moisture associated dermatitis  Side: Right  Location: Groin  Is this injury device related?: No   Dressing Appearance Open to air   Drainage Amount None   Periwound Area Moist   Care Wound cleanser   Dressing   (Triad paste, open to air)        Wound 06/04/25 1030 Moisture associated dermatitis Left Groin   Date First Assessed/Time First Assessed: 06/04/25 1030   Present on Original Admission: Yes  Primary Wound Type: Moisture associated dermatitis  Side: Left  Location: Groin   Dressing Appearance Open to air   Drainage Amount None   Appearance [place]   Periwound Area Moist   Care Cleansed with:;Wound cleanser   Dressing   (Triad paste, open to air)        Wound 06/04/25 1030 Moisture associated dermatitis Sacral spine   Date First Assessed/Time First Assessed: 06/04/25 1030   Present on  Original Admission: Yes  Primary Wound Type: (c) Moisture associated dermatitis  Location: (c) Sacral spine   Dressing Appearance Open to air   Drainage Amount None   Drainage Characteristics/Odor No odor   Periwound Area Moist   Care Wound cleanser   Dressing   (Triad paste, open to air)   Safety   Safety Precautions emergency equipment at bedside   Safety Management   Safety Promotion/Fall Prevention assistive device/personal item within reach;bed alarm set   Medication Review/Management medications reviewed   Patient Rounds bed in low position;bed wheels locked;call light in patient/parent reach   Safety Bands on Patient Fall Risk Band   Daily Care   Activity Management Up in chair - L3   Activity Assistance Provided independent   Assistive Device Utilized walker   Symptoms Noted During/After Activity shortness of breath   Management, Orthostatic Hypotension moved to upright positioning gradually   Elimination Assistance urinal within reach;urinal provided   Additional Documentation Adaptive Equipment Use (Row)   Positioning   Body Position position changed independently   Head of Bed (HOB) Positioning HOB at 20-30 degrees   Positioning/Transfer Devices pillows;in use

## 2025-06-16 NOTE — PLAN OF CARE
Problem: Pneumonia  Goal: Effective Oxygenation and Ventilation  Intervention: Promote Airway Secretion Clearance  Flowsheets (Taken 6/16/2025 1025)  Breathing Techniques/Airway Clearance: deep/controlled cough encouraged  Cough And Deep Breathing: done independently per patient  Intervention: Optimize Oxygenation and Ventilation  Flowsheets (Taken 6/16/2025 1025)  Airway/Ventilation Management:   airway patency maintained   humidification applied   pulmonary hygiene promoted  Head of Bed (HOB) Positioning: HOB elevated     Problem: Breathing Pattern Ineffective  Goal: Effective Breathing Pattern  Intervention: Promote Improved Breathing Pattern  Flowsheets (Taken 6/16/2025 1025)  Airway/Ventilation Management:   airway patency maintained   humidification applied   pulmonary hygiene promoted  Breathing Techniques/Airway Clearance: deep/controlled cough encouraged  Head of Bed (HOB) Positioning: HOB elevated

## 2025-06-16 NOTE — NURSING
Dr Guthrie notified of sustained HR >150 after diltiazem 10mg ivp.  /77, O2sat 96%.  See new order.

## 2025-06-17 LAB
BACTERIA SPT CULT: NORMAL
GRAM STN SPEC: NORMAL
GRAM STN SPEC: NORMAL

## 2025-06-17 PROCEDURE — 93010 ELECTROCARDIOGRAM REPORT: CPT | Mod: ,,, | Performed by: INTERNAL MEDICINE

## 2025-06-17 PROCEDURE — 94761 N-INVAS EAR/PLS OXIMETRY MLT: CPT

## 2025-06-17 PROCEDURE — 25000003 PHARM REV CODE 250: Performed by: STUDENT IN AN ORGANIZED HEALTH CARE EDUCATION/TRAINING PROGRAM

## 2025-06-17 PROCEDURE — 97112 NEUROMUSCULAR REEDUCATION: CPT | Mod: CQ

## 2025-06-17 PROCEDURE — 25000003 PHARM REV CODE 250

## 2025-06-17 PROCEDURE — 25000003 PHARM REV CODE 250: Performed by: INTERNAL MEDICINE

## 2025-06-17 PROCEDURE — 63600175 PHARM REV CODE 636 W HCPCS: Performed by: STUDENT IN AN ORGANIZED HEALTH CARE EDUCATION/TRAINING PROGRAM

## 2025-06-17 PROCEDURE — 97530 THERAPEUTIC ACTIVITIES: CPT | Mod: CO

## 2025-06-17 PROCEDURE — 99900031 HC PATIENT EDUCATION (STAT)

## 2025-06-17 PROCEDURE — 93005 ELECTROCARDIOGRAM TRACING: CPT

## 2025-06-17 PROCEDURE — 94799 UNLISTED PULMONARY SVC/PX: CPT

## 2025-06-17 PROCEDURE — 97110 THERAPEUTIC EXERCISES: CPT | Mod: CQ

## 2025-06-17 PROCEDURE — 94640 AIRWAY INHALATION TREATMENT: CPT

## 2025-06-17 PROCEDURE — 97535 SELF CARE MNGMENT TRAINING: CPT | Mod: CO

## 2025-06-17 PROCEDURE — 25000242 PHARM REV CODE 250 ALT 637 W/ HCPCS

## 2025-06-17 PROCEDURE — 27100171 HC OXYGEN HIGH FLOW UP TO 24 HOURS

## 2025-06-17 PROCEDURE — 94664 DEMO&/EVAL PT USE INHALER: CPT

## 2025-06-17 PROCEDURE — 63600175 PHARM REV CODE 636 W HCPCS: Performed by: HOSPITALIST

## 2025-06-17 PROCEDURE — 99900035 HC TECH TIME PER 15 MIN (STAT)

## 2025-06-17 PROCEDURE — 11000001 HC ACUTE MED/SURG PRIVATE ROOM

## 2025-06-17 PROCEDURE — 27000173 HC ACAPELLA DEVICE DH OR DM

## 2025-06-17 PROCEDURE — 25000003 PHARM REV CODE 250: Performed by: HOSPITALIST

## 2025-06-17 RX ORDER — FUROSEMIDE 10 MG/ML
20 INJECTION INTRAMUSCULAR; INTRAVENOUS ONCE
Status: COMPLETED | OUTPATIENT
Start: 2025-06-17 | End: 2025-06-17

## 2025-06-17 RX ADMIN — ATORVASTATIN CALCIUM 80 MG: 80 TABLET, FILM COATED ORAL at 08:06

## 2025-06-17 RX ADMIN — ROFLUMILAST 500 MCG: 500 TABLET ORAL at 08:06

## 2025-06-17 RX ADMIN — AMITRIPTYLINE HYDROCHLORIDE 25 MG: 25 TABLET, FILM COATED ORAL at 09:06

## 2025-06-17 RX ADMIN — PIPERACILLIN AND TAZOBACTAM 4.5 G: 4; .5 INJECTION, POWDER, FOR SOLUTION INTRAVENOUS at 09:06

## 2025-06-17 RX ADMIN — VANCOMYCIN HYDROCHLORIDE 1000 MG: 1 INJECTION, POWDER, LYOPHILIZED, FOR SOLUTION INTRAVENOUS at 09:06

## 2025-06-17 RX ADMIN — LEVALBUTEROL HYDROCHLORIDE 1.25 MG: 0.63 SOLUTION RESPIRATORY (INHALATION) at 04:06

## 2025-06-17 RX ADMIN — FLUTICASONE PROPIONATE 100 MCG: 50 SPRAY, METERED NASAL at 08:06

## 2025-06-17 RX ADMIN — Medication 4 ML: at 08:06

## 2025-06-17 RX ADMIN — DILTIAZEM HYDROCHLORIDE 5 MG/HR: 5 INJECTION, SOLUTION INTRAVENOUS at 08:06

## 2025-06-17 RX ADMIN — OXYCODONE HYDROCHLORIDE AND ACETAMINOPHEN 1 TABLET: 5; 325 TABLET ORAL at 08:06

## 2025-06-17 RX ADMIN — PIPERACILLIN AND TAZOBACTAM 4.5 G: 4; .5 INJECTION, POWDER, FOR SOLUTION INTRAVENOUS at 01:06

## 2025-06-17 RX ADMIN — FUROSEMIDE 20 MG: 10 INJECTION, SOLUTION INTRAMUSCULAR; INTRAVENOUS at 03:06

## 2025-06-17 RX ADMIN — PIPERACILLIN AND TAZOBACTAM 4.5 G: 4; .5 INJECTION, POWDER, FOR SOLUTION INTRAVENOUS at 05:06

## 2025-06-17 RX ADMIN — GUAIFENESIN 1200 MG: 600 TABLET, MULTILAYER, EXTENDED RELEASE ORAL at 09:06

## 2025-06-17 RX ADMIN — LEVETIRACETAM 500 MG: 500 TABLET, FILM COATED ORAL at 09:06

## 2025-06-17 RX ADMIN — LEVALBUTEROL HYDROCHLORIDE 1.25 MG: 0.63 SOLUTION RESPIRATORY (INHALATION) at 12:06

## 2025-06-17 RX ADMIN — VANCOMYCIN HYDROCHLORIDE 1000 MG: 1 INJECTION, POWDER, LYOPHILIZED, FOR SOLUTION INTRAVENOUS at 08:06

## 2025-06-17 RX ADMIN — LEVALBUTEROL HYDROCHLORIDE 1.25 MG: 0.63 SOLUTION RESPIRATORY (INHALATION) at 08:06

## 2025-06-17 RX ADMIN — METHOCARBAMOL 500 MG: 500 TABLET ORAL at 08:06

## 2025-06-17 RX ADMIN — TIOTROPIUM BROMIDE INHALATION SPRAY 2 PUFF: 3.12 SPRAY, METERED RESPIRATORY (INHALATION) at 08:06

## 2025-06-17 RX ADMIN — PANTOPRAZOLE SODIUM 40 MG: 40 TABLET, DELAYED RELEASE ORAL at 08:06

## 2025-06-17 RX ADMIN — LEVETIRACETAM 500 MG: 500 TABLET, FILM COATED ORAL at 08:06

## 2025-06-17 RX ADMIN — ONDANSETRON 4 MG: 4 TABLET, ORALLY DISINTEGRATING ORAL at 09:06

## 2025-06-17 RX ADMIN — LEVALBUTEROL HYDROCHLORIDE 1.25 MG: 0.63 SOLUTION RESPIRATORY (INHALATION) at 03:06

## 2025-06-17 RX ADMIN — SODIUM CHLORIDE 500 ML: 9 INJECTION, SOLUTION INTRAVENOUS at 08:06

## 2025-06-17 RX ADMIN — Medication: at 08:06

## 2025-06-17 RX ADMIN — ASPIRIN 81 MG: 81 TABLET, COATED ORAL at 08:06

## 2025-06-17 RX ADMIN — APIXABAN 5 MG: 5 TABLET, FILM COATED ORAL at 08:06

## 2025-06-17 RX ADMIN — APIXABAN 5 MG: 5 TABLET, FILM COATED ORAL at 09:06

## 2025-06-17 RX ADMIN — GUAIFENESIN 1200 MG: 600 TABLET, MULTILAYER, EXTENDED RELEASE ORAL at 08:06

## 2025-06-17 RX ADMIN — MIRTAZAPINE 15 MG: 15 TABLET, FILM COATED ORAL at 09:06

## 2025-06-17 NOTE — NURSING
Patient awake and alert. Patient has c/o right shoulder pain. Pain medications given to relieve pain. Patient stated he has no appetite, hasn't eaten and meals this shift. VSS, diltiazem continuous at 5mg/hr. Bath given per patient care tech. Bed locked and in lowest position, call light within reach. Patient instructed to call nurse for assistance when needed. Will continue to monitor.

## 2025-06-17 NOTE — PROGRESS NOTES
[facility] Medicine   Progress Note  Room: 212/212   Patient Name: Jordin Allen Jr.  MRN: [id number]  Admit Date: 6/3/2025   Length of Stay:  LOS: 14 days   Attending Physician: Bernardino Guthrie MD  Nurse Practitioner: Ernestine Palomino NP    Date of Service: 06/17/2025    Principal Problem:    Acute on chronic respiratory failure with hypoxia    Brief HPI:     Jordin Allen Jr. is a 79 y.o. 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  He [place] [facility] [place] with complaints of shortness of breath and left foot redness.  Admitted to hospital medicine team for left foot cellulitis and acute hypoxic respiratory failure secondary to pneumonia/COPD exacerbation. CTA chest negative for PE. Emphysematous change with superimposed edema. Nodular focus within the anterior aspect of left upper lobe.  Started on nebs, prednisone, and empiric vanc/cefepime and doxycycline.  Respiratory panel positive for parainfluenza virus.  Course further complicated by sinus tach with PACs/18, for which he was started on diltiazem.  Blood cultures positive for staph, suspected to be contaminant.  Repeat blood cultures returned negative.  He will switch to Augmentin and doxy.  Ongoing episodes of SVT, thought to be due to underlying hypoxia.  He was able to be weaned down to high-flow nasal cannula 15 L.  Discharged to [facility] on 06/03/2025 for rehab, wound care, and O2 weaning.     6/9-6/11-sent out to [facility] [place] for concerns of [place]. Workup revealed findings concerning for pneumonia on CT. Started on empiric vanc and zosyn. Also started on 5 day course of prednisone for COPD exacerbation.    Interval History    Attempted to stop diltiazem drip yesterday, however after approximately 2 hours off of diltiazem drip, patient became tachycardic  again.  He was resumed on diltiazem drip at 5 mg/hour. Holding oral dilt for now  Heart rate remained stable overnight on dilt drip   Episodes of desaturation overnight.  Required increase in nasal cannula to12 L  On 12 L nasal cannula this morning with O2 sats 93%   CXR reviewed, notable for pulmonary edema versus pneumonia  Giving dose of Lasix 20 mg IV x1 today  Sputum culture obtained, pending results  Continue empiric vancomycin and Zosyn  Pulmonology consult ordered      Subjective:     Review of Systems   Constitutional:  Positive for malaise/fatigue.   Respiratory:  Positive for cough, sputum production and shortness of breath.    Cardiovascular:  Negative for chest pain and leg swelling.   Gastrointestinal:  Negative for heartburn, nausea and vomiting.   Genitourinary:  Negative for dysuria and urgency.   Musculoskeletal:  Positive for joint pain (R shoulder). Negative for myalgias.   Neurological:  Positive for weakness. Negative for dizziness.   Psychiatric/Behavioral:  Negative for depression. The patient does not have insomnia.          Objective:     Vital Sign Range  Temp:  [97 °F (36.1 °C)-99.1 °F (37.3 °C)]   Pulse:  []   Resp:  [15-50]   BP: ()/(50-66)   SpO2:  [93 %-100 %]     Body mass index is 30.02 kg/m².           Intake/Output Summary (Last 24 hours) at 6/17/2025 0989  Last data filed at 6/17/2025 0522  Gross per 24 hour   Intake --   Output 825 ml   Net -825 ml       Physical Exam  Constitutional:       Appearance: He is ill-appearing.   HENT:      Head: Normocephalic and atraumatic.      Mouth/Throat:      Mouth: Mucous membranes are moist.      Pharynx: Oropharynx is clear.   Eyes:      Extraocular Movements: Extraocular movements intact.      Pupils: Pupils are equal, round, and reactive to light.   Cardiovascular:      Rate and Rhythm: Normal rate. Rhythm irregular.   Pulmonary:      Breath sounds: Wheezing and rhonchi present.   Abdominal:      General: Bowel sounds are  normal.      Palpations: Abdomen is soft.   Musculoskeletal:      Right lower leg: Edema present.      Left lower leg: Edema present.   Skin:     General: Skin is warm and dry.   Neurological:      Mental Status: He is alert and oriented to person, place, and time. Mental status is at baseline.   Psychiatric:         Mood and Affect: Affect is flat.         Wounds       Wound 05/19/25 0233 Moisture associated dermatitis Left dorsal Foot (Active)   05/19/25 0233 Foot   Present on Original Admission: Y   Primary Wound Type: Moisture ass   Side: Left   Orientation: dorsal        Wound 05/19/25 0231 Moisture associated dermatitis Left lateral Foot (Active)   05/19/25 0231 Foot   Present on Original Admission: Y   Primary Wound Type: Moisture ass   Side: Left   Orientation: lateral        Wound 05/19/25 0231 Pressure Injury Right anterior Ankle (Active)   05/19/25 0231 Ankle   Present on Original Admission: Y   Primary Wound Type: Pressure inj   Side: Right   Orientation: anterior        Wound 05/19/25 0233 Moisture associated dermatitis Left anterior Foot (Active)   05/19/25 0233 Foot   Present on Original Admission:    Primary Wound Type: Moisture ass   Side: Left   Orientation: anterior        Wound 06/04/25 1030 Moisture associated dermatitis Right lateral Abdomen (Active)   06/04/25 1030 Abdomen   Present on Original Admission: Y   Primary Wound Type: Moisture ass   Side: Right   Orientation: lateral        Wound 06/04/25 1030 Moisture associated dermatitis Left lateral Abdomen (Active)   06/04/25 1030 Abdomen   Present on Original Admission: Y   Primary Wound Type: Moisture ass   Side: Left   Orientation: lateral        Wound 06/04/25 1030 Moisture associated dermatitis Right Groin (Active)   06/04/25 1030 Groin   Present on Original Admission: Y   Primary Wound Type: Moisture ass   Side: Right        Wound 06/04/25 1030 Moisture associated dermatitis Left Groin (Active)   06/04/25 1030 Groin   Present on Original  "Admission: Y   Primary Wound Type: Moisture ass   Side: Left        Wound 06/04/25 1030 Moisture associated dermatitis Sacral spine (Active)   06/04/25 1030 Sacral spine   Present on Original Admission: Y   Primary Wound Type: Moisture ass         Wounds consistently followed by [facility] NP Sheree Tao     Labs:  Recent Labs   Lab 06/11/25  0441 06/13/25  0413 06/16/25  0429   WBC 11.82 11.92 13.54*   HGB 10.9* 10.0* 10.6*   HCT 33.3* 31.1* 33.8*   * 138* 125*     Recent Labs   Lab 06/11/25  0442 06/13/25  0413 06/16/25  0429   * 143 142   K 3.1* 4.0 3.8   CL 94* 102 106   CO2 30* 31* 27   BUN 34* 37* 21   CREATININE 0.9 1.1 0.8   * 167* 137*   CALCIUM 8.3* 8.6* 8.7   MG 2.1 2.1 1.8   PHOS 3.2 2.1* 2.4*     Recent Labs   Lab 06/11/25 0442 06/13/25  0413 06/16/25  0429   ALKPHOS 116 130 104   * 111* 75*   * 43 25   ALBUMIN 1.9* 2.0* 2.0*   PROT 5.1* 5.0* 5.0*   BILITOT 0.5 0.3 0.6       No results for input(s): "POCTGLUCOSE" in the last 72 hours.      Meds Scheduled:   amitriptyline  25 mg Oral QHS    ammonium lactate   Topical (Top) Daily    apixaban  5 mg Oral BID    aspirin  81 mg Oral QAM    atorvastatin  80 mg Oral Daily    fluticasone propionate  2 spray Each Nostril Daily    guaiFENesin  1,200 mg Oral BID    levalbuterol  1.2495 mg Nebulization Q4H    levETIRAcetam  500 mg Oral BID    mirtazapine  15 mg Oral QHS    pantoprazole  40 mg Oral Daily    piperacillin-tazobactam (Zosyn) IV (PEDS and ADULTS) (extended infusion is not appropriate)  4.5 g Intravenous Q8H    roflumilast  500 mcg Oral Daily    sodium chloride 3%  4 mL Nebulization BID    tiotropium bromide  2 puff Inhalation Daily    vancomycin (VANCOCIN) IV (PEDS and ADULTS)  1,000 mg Intravenous Q12H       Current Inpatient Problem List:  Active Hospital Problems    Diagnosis  POA    *Acute on chronic respiratory failure with hypoxia [J96.21]  Yes    Parainfluenza virus infection [B34.8]  Yes    Pneumonia of " right lung due to infectious organism [J18.9]  Yes    Cellulitis [L03.90]  Yes    TIA (transient ischemic attack) [G45.9]  Yes     ASA and statin      Metastasis to brain [C79.31]  Yes    Adenocarcinoma, lung, 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       CAD (coronary artery disease) [I25.10]  Yes     Chronic     -LHC (10/31/13) for stemi: pLAD 100%, Cx with Li's, mRCA 40%, LVEF 55%,. LVEDP 15   -Xience 3.0 x 33 mm to pLAD, post dilated with 3.5 NC and 4.0 NC.   -TTE (8/14/13): LVEF 55%, grade I diastolic dysfunction      HTN (hypertension), benign [I10]  Yes    SHOLA (obstructive sleep apnea) [G47.33]  Yes     CPAP at night      GERD (gastroesophageal reflux disease) [K21.9]  Yes     Continue PPI        Resolved Hospital Problems   No resolved problems to display.         Assessment / Plan:     Acute respiratory failure with hypoxia  COPD with acute exacerbation  Parainfluenza virus infection  Pneumonia   On Symbicort Spiriva and Daliresp at home. Follows up with pulmonology outpatient.   Completed 7 day course of Augmentin and doxycycline on 06/01  Continue Daliresp 500mcg daily  Continue Xopenex p.r.n.   Continue weaning high-flow nasal cannula  Continue guaifenesin 1200 mg b.i.d.  Increasing O2 requirements   Now on 12 L nasal cannula (up from 1 L on 06/13)   Completed 5 day course of prednisone on 06/13   Continue empiric Zosyn which was started on 06/12   Continue empiric vancomycin which was started on 06/16   Sputum cultures in process  Pending pulmonology consult  CXR reviewed, notable for pulmonary edema versus pneumonia  Giving dose of Lasix 20 mg IV x1 today      Leukocytosis, new   WBC slightly up to 13.4 on 06/16  Possibly reactive to recent steroids, though WBCs were WNL since 06/10, and prednisone course completed on 06/13.  No fevers, though  clinically patient did seem to improve in oxygen requirements when he was sent out to ED on 06/09 and received Zosyn and vancomycin  Resumed course of empiric vancomycin on 06/16  Continue course of Zosyn    Chest pain, new   Resolved   Evaluated by cardiology while in the hospital  Will need outpatient follow up with Dr. Ba with Cardiology     Cellulitis   Wound followed and managed by consistent, contracted [facility] NP  Completed 7 day course of doxy and Augmentin  Will need follow up with Podiatry after discharge     Restless leg syndrome   Continue amitriptyline 25 mg nightly     Adenocarcinoma, lung, left   Metastasis to brain  Completed treatment with chemo/XRT. Brain XRT for lesionsx2. off immunotherapy since 12/24  suspect the LLL area was consolidation of prior RXT changes and not malignancy and is nearly resolved.  CT on presentation with new SANJAY nodule, need op follow up with pulm  Continue prophylactic Keppra 500 mg b.i.d.    Right shoulder pain  Having some shoulder pain to his right shoulder, which seems to be some muscle spasms.    Started Robaxin 500 mg q.i.d. p.r.n. on 6/6    Compression fracture of L1 vertebrae with routine healing   TLSO brace when out of bed  F/u with Dr. Hopkins     Dyslipidemia  TIA  CAD   Continue aspirin   Continue atorvastatin 80 mg daily     Tachycardia   Episodes of SVT while at the hospital   Evaluated by Cardiology   Likely worsened by underlying hypoxia   Supplemental O2   Uncontrolled AFib with RVR on 06/16, requiring multiple doses of diltiazem   Ultimately placed on diltiazem drip   Attempted to wean off of Dilt drip on evening of 06/16, though not tolerated   Continue diltiazem drip for now at 5mg/hr       SHOLA   Continue CPAP nightly     GERD  Continue PPI daily     Left lateral foot wound   Right anterior ankle wound   Traumatic left dorsal foot wound   Left anterior foot venous ulcer   Skin tear right distal arm   Pressure injury nose  Wound followed and  managed by consistent, contracted [facility] NP    Advance Care Planning, 06/05/2025  This was a voluntary visit and the option to decline counseling was given  Length of discussion:  16 minutes  Life limiting problem:  Respiratory failure, adenocarcinoma  Topics discussed:  Code status  Outcome of discussion:  Patient would like to remain a full code.  In the event that he is not able to make his own decisions, he would like to defer decision making to his daughter  Decision Maker:Jordin Allen     Psychiatric Assessment  Patient Health Questionnaire-9 (PHQ-9)    Date:  06/05/2025  Total Score: 15  Screening is Positive   Diagnosis and Treatment Plan:  Moderate MDD   Continue amitriptyline 25 mg nightly   Increasing mirtazapine to 15 mg nightly, which will hopefully help with his appetite as well       Diet:  Cardiac   GI Ppx:  PPI   DVT Ppx:  Enoxaparin     Lines:  PIV   Drains:  None   Airways:n/a   Wounds:  Multiple    Goals of Care:   Restorative,  Treat infection  Optimize nutrition  Wound healing  Muscle strengthening  Restoration of ADL's  Improve mobility    Anticipated Disposition:    Anticipated d/c 6/24 IPR vs home    Follow up appointments:   Future Appointments   Date Time Provider Department Center   6/24/2025  7:15 AM [facility]-MRI2 [facility]   6/24/2025  9:30 AM Steven Campos MD [facility] NEUROSC [place]   6/24/2025  2:00 PM Bienvenido Henson MD [facility] RADONC3 [place]   7/1/2025 10:30 AM [facility] OI-XRAY [facility]   7/1/2025 11:30 AM Bolivar Sr PA [facility] NEUROS8 [place] [facility]   7/8/2025  9:15 AM MEEKS, VISUAL FIELDS [facility] OPHTHAL [place] [address]   7/8/2025 10:00 AM Cara Looney MD [facility] OPHTHAL [address]   7/15/2025 10:00 AM Garrett Lloyd DPM [facility] POD [place] [address]   7/23/2025 10:15 AM CV [facility] ECHO [facility] [place]   8/15/2025  9:20 AM Bienvenido Ward MD [facility] PULMON [place]   8/27/2025  9:00 AM Guido Trejo MD [facility] ENT [place]    9/8/2025 11:10 AM Yan Palmer MD [facility] Aby Palomino NP  Hospital Medicine [facility]    I have spent 61 minutes reviewing patient records, examining, and  of the patient/ patient's family with greater than 50% of the time spent with direct patient care and coordination.

## 2025-06-17 NOTE — CARE UPDATE
06/16/25 1923   PRE-TX-O2   Device (Oxygen Therapy) nasal cannula with humidification   Flow (L/min) (Oxygen Therapy) 12   SpO2 97 %   Pulse Oximetry Type Continuous   $ Pulse Oximetry - Multiple Charge Pulse Oximetry - Multiple   Pulse 94   Resp 18   Positioning HOB elevated 30 degrees

## 2025-06-17 NOTE — NURSING
Patient HR sustaining at 154, NP aware and put in order to increase Diltiazem to 10 ml/hr. Will continue to monitor.

## 2025-06-17 NOTE — PT/OT/SLP PROGRESS
Physical Therapy Treatment    Patient Name:  Jordin Allen Jr.   MRN:  [id number]    Recommendations:     Discharge Recommendations: Moderate Intensity Therapy  Discharge Equipment Recommendations: to be determined by next level of care  Barriers to discharge: Decreased caregiver support    Assessment:     Jordin Allen Jr. is a 79 y.o. male admitted with a medical diagnosis of Acute on chronic respiratory failure with hypoxia.  He presents with the following impairments/functional limitations: weakness, impaired balance, decreased safety awareness, impaired endurance, pain, edema, abnormal tone, impaired self care skills, decreased coordination, decreased ROM, impaired functional mobility, decreased upper extremity function, decreased lower extremity function, impaired cardiopulmonary response to activity .    Rehab Prognosis: Good; patient would benefit from acute skilled PT services to address these deficits and reach maximum level of function.    Recent Surgery: * No surgery found *      Plan:     During this hospitalization, patient to be seen 5 x/week to address the identified rehab impairments via therapeutic exercises, neuromuscular re-education, therapeutic activities and progress toward the following goals:    Plan of Care Expires:       Subjective     Chief Complaint: Pt c/o 3/10 (R) posterior neck pain.  Patient/Family Comments/goals: Pt wants to get stronger and return home.  Pain/Comfort:  Pain Rating 1: 3/10  Location - Side 1: Right  Location - Orientation 1: posterior  Location 1: neck  Pain Addressed 1: Reposition  Pain Rating Post-Intervention 1: 3/10      Objective:     Communicated with PT/Nursing prior to session.  Patient found HOB elevated with blood pressure cuff, pulse ox (continuous), peripheral IV, telemetry upon PT entry to room.     General Precautions: Standard, fall  Orthopedic Precautions: spinal precautions  Braces: N/A  Respiratory Status: Nasal cannula, flow 12 L/min      Functional Mobility:  Bed Mobility:  Rolling Left:  moderate assistance  Rolling Right: moderate assistance  Scooting: maximal assistance and total assistance  Supine to Sit: moderate assistance and maximal assistance  Sit to Supine: moderate assistance and maximal assistance  Balance: sitting  fair +      AM-PAC 6 CLICK MOBILITY  Turning over in bed (including adjusting bedclothes, sheets and blankets)?: 2  Sitting down on and standing up from a chair with arms (e.g., wheelchair, bedside commode, etc.): 1  Moving from lying on back to sitting on the side of the bed?: 2  Moving to and from a bed to a chair (including a wheelchair)?: 1  Need to walk in hospital room?: 1  Climbing 3-5 steps with a railing?: 1  Basic Mobility Total Score: 8       Treatment & Education:  Pt performed bed mobility as noted above and ty sitting at EOB with SBA ~ 25 minutes and performed (B) LE TE A/AAROM and gentle stretching all available planes x 20 reps x 2 sets ea. Performed x 10 minutes of sitting balance activities then returned to bed in (L) side lying with head elevated and call button in reach.     Patient left left sidelying with all lines intact, call button in reach, and bed alarm on..    GOALS:   Multidisciplinary Problems       Physical Therapy Goals          Problem: Physical Therapy    Goal Priority Disciplines Outcome Interventions   Physical Therapy Goal     PT, PT/OT Progressing    Description: Goals to be met by: DC     Patient will increase functional independence with mobility by performin. Supine to sit with [place]  2. Sit to supine with [place]  3. Sit to stand transfer with Supervision with RW  4. Gait  x 150 feet with Contact Guard Assistance using Rolling Walker.                          DME Justifications:  No DME recommended requiring DME justifications    Time Tracking:     PT Received On: 25  PT Start Time: 0950     PT Stop Time: 1030  PT Total Time (min): 40 min     Billable  Minutes: Total Time 40  min     Treatment Type: Treatment  PT/PTA: PTA     Number of PTA visits since last PT visit: 2     06/17/2025

## 2025-06-17 NOTE — PROGRESS NOTES
06/17/25 0930        Wound 05/19/25 0233 Moisture associated dermatitis Left dorsal Foot   Date First Assessed/Time First Assessed: 05/19/25 0233   Present on Original Admission: Yes  Primary Wound Type: Moisture associated dermatitis  Side: Left  Orientation: dorsal  Location: Foot  Is this injury device related?: No   Dressing Appearance Open to air   Appearance Dry   Care Cleansed with:;Wound cleanser   Dressing   (applied Lac-hydrin open to air)        Wound 06/04/25 1030 Moisture associated dermatitis Right lateral Abdomen   Date First Assessed/Time First Assessed: 06/04/25 1030   Present on Original Admission: Yes  Primary Wound Type: Moisture associated dermatitis  Side: Right  Orientation: lateral  Location: Abdomen   Dressing Appearance Open to air   Periwound Area Moist        Wound 06/04/25 1030 Moisture associated dermatitis Right Groin   Date First Assessed/Time First Assessed: 06/04/25 1030   Present on Original Admission: Yes  Primary Wound Type: Moisture associated dermatitis  Side: Right  Location: Groin  Is this injury device related?: No   Dressing Appearance Open to air;No dressing   Periwound Area Moist        Wound 06/04/25 1030 Moisture associated dermatitis Sacral spine   Date First Assessed/Time First Assessed: 06/04/25 1030   Present on Original Admission: Yes  Primary Wound Type: (c) Moisture associated dermatitis  Location: (c) Sacral spine   Dressing Appearance Open to air   Periwound Area Moist

## 2025-06-17 NOTE — PLAN OF CARE
Problem: Wound  Goal: Absence of Infection Signs and Symptoms  Outcome: Progressing     Problem: Wound  Goal: Skin Health and Integrity  Outcome: Progressing     Problem: Wound  Goal: Optimal Pain Control and Function  Outcome: Progressing

## 2025-06-17 NOTE — PLAN OF CARE
Pt placed on high flow nasal cannula overnight which appears to give patient full relief. Oxygenation did not decrease. Pt oxygenated 97-99% throughout the night. Continues on cardizem infusion which hr has been controlled and stable overnight. Pt did not eat due to fatigue and no appetite. No bm overnight. Good UO. No other complaints or acute events overnight. Bed in lowest position and wheels locked, call light within reach, side rails raised x 3. Will continue to monitor.

## 2025-06-17 NOTE — PT/OT/SLP PROGRESS
Occupational Therapy   Treatment  Co-treatment with PT due to pt's requiring 2 skilled therapists to safely perform mobility and to accommodate activity tolerance     Name: Jordin Allen Jr.  MRN: [id number]  Admitting Diagnosis:  Acute on chronic respiratory failure with hypoxia       Recommendations:     Discharge Recommendations: Moderate Intensity Therapy  Discharge Equipment Recommendations:   (TBD by next level of care)  Barriers to discharge:  Decreased caregiver support (increased assistance needed)    Assessment:     Jordin Allen Jr. is a 79 y.o. male with a medical diagnosis of Acute on chronic respiratory failure with hypoxia.  He presents with performance deficits affecting function are weakness, impaired endurance, impaired self care skills, impaired functional mobility, impaired balance, decreased upper extremity function, decreased lower extremity function, decreased ROM.     Rehab Prognosis:  Good; patient would benefit from acute skilled OT services to address these deficits and reach maximum level of function.       Plan:     Patient to be seen 5 x/week to address the above listed problems via self-care/home management, therapeutic activities, therapeutic exercises, neuromuscular re-education  Plan of Care Expires: 07/10/25  Plan of Care Reviewed with: patient    Subjective     Chief Complaint: feeling better since yesterday  Patient/Family Comments/goals: no new goals  Pain/Comfort:  Pain Rating 1: 0/10    Objective:     Communicated with: nurse prior to session.  Patient found HOB elevated with blood pressure cuff, pulse ox (continuous), peripheral IV, telemetry upon OT entry to room.    General Precautions: Standard, fall    Orthopedic Precautions:spinal precautions  Braces: TLSO  Respiratory Status: High flow nasal canula, flow 12 L     Occupational Performance:     Bed Mobility:    Patient completed Rolling/Turning to Left with  contact guard assistance  Patient completed Rolling/Turning  to Right with contact guard assistance  Patient completed Supine to Sit with minimum assistance  Patient completed Sit to Supine with minimum assistance     Activities of Daily Living:  Grooming: stand by assistance and contact guard assistance with facial hygiene sitting EOB.       [facility] 6 Click ADL:      Treatment & Education:  Patient seen by SCHMIDT for therapeutic activity and ADL performance.  Patient performed bed mobility as stated above.  Patient performed sitting balance at EOB; GMC, reaching, crossing midline, and ROM to increase functional task performance.  Patient performed ADL as stated above.  Patient educated on role of SCHMIDT and safety during treatment session.      Patient left HOB elevated with all lines intact and call button in reach    GOALS:   Multidisciplinary Problems       Occupational Therapy Goals          Problem: Occupational Therapy    Goal Priority Disciplines Outcome Interventions   Occupational Therapy Goal     OT, PT/OT Progressing    Description: Goals to be met by: 7//10/2025     Patient will increase functional independence with ADLs by performing:    UE Dressing with Set-up Assistance.  LE Dressing with SBA using appropriate AE as needed.  Grooming while seated at sink with Set-up Assistance.  Toileting from 3-in-1 commode over toilet with Stand-by Assistance for hygiene and clothing management.   Supine to sit with Mod I.   Step transfer with Stand-by Assistance with RW.  Toilet transfer to 3-in-1 commode over toilet with Stand-by Assistance with RW.                         DME Justifications:  TBD    Time Tracking:     OT Date of Treatment: 06/17/25  OT Start Time: 0950  OT Stop Time: 1030  OT Total Time (min): 40 min    Billable Minutes:Self Care/Home Management 15 min  Therapeutic Activity 25 min    OT/JOSSUE: JOSSUE     Number of JOSSUE visits since last OT visit: 2    6/17/2025

## 2025-06-18 ENCOUNTER — HOSPITAL ENCOUNTER (INPATIENT)
Facility: HOSPITAL | Age: 80
LOS: 4 days | Discharge: NURSING, REHAB OR LONG-TERM CARE | DRG: 189 | End: 2025-06-22
Attending: EMERGENCY MEDICINE | Admitting: EMERGENCY MEDICINE
Payer: MEDICARE

## 2025-06-18 ENCOUNTER — PATIENT MESSAGE (OUTPATIENT)
Dept: DERMATOLOGY | Facility: CLINIC | Age: 80
End: 2025-06-18
Payer: MEDICARE

## 2025-06-18 DIAGNOSIS — I48.91 AFIB: ICD-10-CM

## 2025-06-18 DIAGNOSIS — R00.0 TACHYCARDIA: ICD-10-CM

## 2025-06-18 DIAGNOSIS — I48.91 ATRIAL FIBRILLATION WITH RVR: Primary | ICD-10-CM

## 2025-06-18 DIAGNOSIS — R07.9 CHEST PAIN: ICD-10-CM

## 2025-06-18 DIAGNOSIS — J18.9 PNEUMONIA OF BOTH LOWER LOBES DUE TO INFECTIOUS ORGANISM: ICD-10-CM

## 2025-06-18 PROBLEM — I95.9 HYPOTENSION: Status: RESOLVED | Noted: 2024-07-25 | Resolved: 2025-06-18

## 2025-06-18 LAB
ABSOLUTE EOSINOPHIL (OHS): 0.27 K/UL
ABSOLUTE EOSINOPHIL (OHS): 0.36 K/UL
ABSOLUTE MONOCYTE (OHS): 0.88 K/UL (ref 0.3–1)
ABSOLUTE MONOCYTE (OHS): 1.28 K/UL (ref 0.3–1)
ABSOLUTE NEUTROPHIL COUNT (OHS): 13.92 K/UL (ref 1.8–7.7)
ABSOLUTE NEUTROPHIL COUNT (OHS): 8.86 K/UL (ref 1.8–7.7)
ALBUMIN SERPL BCP-MCNC: 1.8 G/DL (ref 3.5–5.2)
ALBUMIN SERPL BCP-MCNC: 2 G/DL (ref 3.5–5.2)
ALP SERPL-CCNC: 103 UNIT/L (ref 40–150)
ALP SERPL-CCNC: 120 UNIT/L (ref 40–150)
ALT SERPL W/O P-5'-P-CCNC: 51 UNIT/L (ref 10–44)
ALT SERPL W/O P-5'-P-CCNC: 52 UNIT/L (ref 10–44)
ANION GAP (OHS): 11 MMOL/L (ref 8–16)
ANION GAP (OHS): 11 MMOL/L (ref 8–16)
AST SERPL-CCNC: 20 UNIT/L (ref 11–45)
AST SERPL-CCNC: 25 UNIT/L (ref 11–45)
B PERT DNA NPH QL NAA+PROBE: NOT DETECTED
BASOPHILS # BLD AUTO: 0.03 K/UL
BASOPHILS # BLD AUTO: 0.04 K/UL
BASOPHILS NFR BLD AUTO: 0.2 %
BASOPHILS NFR BLD AUTO: 0.3 %
BILIRUB SERPL-MCNC: 0.6 MG/DL (ref 0.1–1)
BILIRUB SERPL-MCNC: 0.8 MG/DL (ref 0.1–1)
BIPAP: 0
BNP SERPL-MCNC: 63 PG/ML (ref 0–99)
BUN SERPL-MCNC: 18 MG/DL (ref 8–23)
BUN SERPL-MCNC: 19 MG/DL (ref 8–23)
C PNEUM DNA LOWER RESP QL NAA+NON-PROBE: NOT DETECTED
CALCIUM SERPL-MCNC: 8.5 MG/DL (ref 8.7–10.5)
CALCIUM SERPL-MCNC: 9 MG/DL (ref 8.7–10.5)
CHLORIDE SERPL-SCNC: 100 MMOL/L (ref 95–110)
CHLORIDE SERPL-SCNC: 103 MMOL/L (ref 95–110)
CO2 SERPL-SCNC: 22 MMOL/L (ref 23–29)
CO2 SERPL-SCNC: 27 MMOL/L (ref 23–29)
CORRECTED TEMPERATURE (PCO2): 49.3 MMHG
CORRECTED TEMPERATURE (PH): 7.41
CORRECTED TEMPERATURE (PO2): 30.8 MMHG
CREAT SERPL-MCNC: 0.7 MG/DL (ref 0.5–1.4)
CREAT SERPL-MCNC: 0.7 MG/DL (ref 0.5–1.4)
ERYTHROCYTE [DISTWIDTH] IN BLOOD BY AUTOMATED COUNT: 16.7 % (ref 11.5–14.5)
ERYTHROCYTE [DISTWIDTH] IN BLOOD BY AUTOMATED COUNT: 17 % (ref 11.5–14.5)
FERRITIN SERPL-MCNC: 2096 NG/ML (ref 20–300)
FIO2: 21 %
FLUAV RNA NPH QL NAA+NON-PROBE: NOT DETECTED
FLUBV RNA NPH QL NAA+NON-PROBE: NOT DETECTED
GFR SERPLBLD CREATININE-BSD FMLA CKD-EPI: >60 ML/MIN/1.73/M2
GFR SERPLBLD CREATININE-BSD FMLA CKD-EPI: >60 ML/MIN/1.73/M2
GLUCOSE SERPL-MCNC: 106 MG/DL (ref 70–110)
GLUCOSE SERPL-MCNC: 129 MG/DL (ref 70–110)
HADV DNA NPH QL NAA+NON-PROBE: NOT DETECTED
HCOV 229E RNA NPH QL NAA+NON-PROBE: NOT DETECTED
HCOV HKU1 RNA NPH QL NAA+NON-PROBE: NOT DETECTED
HCOV NL63 RNA NPH QL NAA+NON-PROBE: NOT DETECTED
HCOV OC43 RNA NPH QL NAA+NON-PROBE: NOT DETECTED
HCT VFR BLD AUTO: 35.6 % (ref 40–54)
HCT VFR BLD AUTO: 36.1 % (ref 40–54)
HCT VFR BLD CALC: 40.2 % (ref 36–54)
HGB BLD-MCNC: 11.7 GM/DL (ref 14–18)
HGB BLD-MCNC: 11.7 GM/DL (ref 14–18)
HMPV RNA LOWER RESP QL NAA+NON-PROBE: NOT DETECTED
HMPV RNA NPH QL NAA+NON-PROBE: NOT DETECTED
HPIV1 RNA NPH QL NAA+NON-PROBE: NOT DETECTED
HPIV2 RNA NPH QL NAA+NON-PROBE: NOT DETECTED
HPIV3 RNA NPH QL NAA+NON-PROBE: NOT DETECTED
HPIV4 RNA NPH QL NAA+NON-PROBE: NOT DETECTED
IMM GRANULOCYTES # BLD AUTO: 0.31 K/UL (ref 0–0.04)
IMM GRANULOCYTES # BLD AUTO: 0.53 K/UL (ref 0–0.04)
IMM GRANULOCYTES NFR BLD AUTO: 2.9 % (ref 0–0.5)
IMM GRANULOCYTES NFR BLD AUTO: 3.1 % (ref 0–0.5)
IRON SATN MFR SERPL: 13 % (ref 20–50)
IRON SERPL-MCNC: 21 UG/DL (ref 45–160)
LACTATE SERPL-SCNC: 1.2 MMOL/L (ref 0.5–2.2)
LDH SERPL L TO P-CCNC: 1.6 MMOL/L (ref 0.5–2.2)
LYMPHOCYTES # BLD AUTO: 0.39 K/UL (ref 1–4.8)
LYMPHOCYTES # BLD AUTO: 0.91 K/UL (ref 1–4.8)
MAGNESIUM SERPL-MCNC: 1.9 MG/DL (ref 1.6–2.6)
MAGNESIUM SERPL-MCNC: 1.9 MG/DL (ref 1.6–2.6)
MCH RBC QN AUTO: 28.3 PG (ref 27–31)
MCH RBC QN AUTO: 28.9 PG (ref 27–31)
MCHC RBC AUTO-ENTMCNC: 32.4 G/DL (ref 32–36)
MCHC RBC AUTO-ENTMCNC: 32.9 G/DL (ref 32–36)
MCV RBC AUTO: 87 FL (ref 82–98)
MCV RBC AUTO: 88 FL (ref 82–98)
NUCLEATED RBC (/100WBC) (OHS): 0 /100 WBC
NUCLEATED RBC (/100WBC) (OHS): 0 /100 WBC
OHS QRS DURATION: 70 MS
OHS QRS DURATION: 74 MS
OHS QRS DURATION: 86 MS
OHS QTC CALCULATION: 396 MS
OHS QTC CALCULATION: 427 MS
OHS QTC CALCULATION: 571 MS
PCO2 BLDA: 33.2 MMHG (ref 35–45)
PCO2 BLDA: 38.7 MMHG (ref 35–45)
PCO2 BLDA: 49.3 MMHG (ref 35–45)
PH SMN: 7.41 [PH] (ref 7.35–7.45)
PH SMN: 7.47 [PH] (ref 7.35–7.45)
PH SMN: 7.54 [PH] (ref 7.35–7.45)
PHOSPHATE SERPL-MCNC: 1.8 MG/DL (ref 2.7–4.5)
PHOSPHATE SERPL-MCNC: 2.4 MG/DL (ref 2.7–4.5)
PLATELET # BLD AUTO: 240 K/UL (ref 150–450)
PLATELET # BLD AUTO: 98 K/UL (ref 150–450)
PLATELET BLD QL SMEAR: NORMAL
PMV BLD AUTO: 10.8 FL (ref 9.2–12.9)
PMV BLD AUTO: 11.3 FL (ref 9.2–12.9)
PO2 BLDA: 29.3 MMHG (ref 40–60)
PO2 BLDA: 30.8 MMHG (ref 40–60)
PO2 BLDA: 32.9 MMHG (ref 40–60)
POC BASE DEFICIT: 4.3 MMOL/L
POC BASE DEFICIT: 5.4 MMOL/L
POC BASE DEFICIT: 6.1 MMOL/L
POC HCO3: 27.7 MMOL/L (ref 24–28)
POC HCO3: 28.5 MMOL/L (ref 24–28)
POC HCO3: 29.2 MMOL/L (ref 24–28)
POC IONIZED CALCIUM: 0.99 MMOL/L (ref 1.06–1.42)
POC PERFORMED BY: ABNORMAL
POC TEMPERATURE: 37 C
POTASSIUM BLD-SCNC: 3.4 MMOL/L (ref 3.5–5.1)
POTASSIUM SERPL-SCNC: 3.3 MMOL/L (ref 3.5–5.1)
POTASSIUM SERPL-SCNC: 3.5 MMOL/L (ref 3.5–5.1)
PROCALCITONIN SERPL-MCNC: 0.71 NG/ML
PROT SERPL-MCNC: 5.1 GM/DL (ref 6–8.4)
PROT SERPL-MCNC: 5.8 GM/DL (ref 6–8.4)
RBC # BLD AUTO: 4.05 M/UL (ref 4.6–6.2)
RBC # BLD AUTO: 4.14 M/UL (ref 4.6–6.2)
RELATIVE EOSINOPHIL (OHS): 2.1 %
RELATIVE EOSINOPHIL (OHS): 2.5 %
RELATIVE LYMPHOCYTE (OHS): 3.6 % (ref 18–48)
RELATIVE LYMPHOCYTE (OHS): 5.3 % (ref 18–48)
RELATIVE MONOCYTE (OHS): 7.5 % (ref 4–15)
RELATIVE MONOCYTE (OHS): 8.2 % (ref 4–15)
RELATIVE NEUTROPHIL (OHS): 81.8 % (ref 38–73)
RELATIVE NEUTROPHIL (OHS): 82.5 % (ref 38–73)
RSV RNA NPH QL NAA+NON-PROBE: NOT DETECTED
RSV RNA NPH QL NAA+NON-PROBE: NOT DETECTED
RV+EV RNA NPH QL NAA+NON-PROBE: NOT DETECTED
SARS-COV-2 RNA RESP QL NAA+PROBE: NOT DETECTED
SODIUM BLD-SCNC: 134 MMOL/L (ref 136–145)
SODIUM SERPL-SCNC: 136 MMOL/L (ref 136–145)
SODIUM SERPL-SCNC: 138 MMOL/L (ref 136–145)
SPECIMEN SOURCE: ABNORMAL
SPECIMEN SOURCE: NORMAL
TIBC SERPL-MCNC: 157 UG/DL (ref 250–450)
TRANSFERRIN SERPL-MCNC: 106 MG/DL (ref 200–375)
TROPONIN I SERPL HS-MCNC: 15 NG/L
TROPONIN I SERPL HS-MCNC: 18 NG/L
TROPONIN I SERPL HS-MCNC: 20 NG/L
VANCOMYCIN SERPL-MCNC: 9.6 UG/ML (ref ?–80)
WBC # BLD AUTO: 10.74 K/UL (ref 3.9–12.7)
WBC # BLD AUTO: 17.04 K/UL (ref 3.9–12.7)

## 2025-06-18 PROCEDURE — 25000003 PHARM REV CODE 250

## 2025-06-18 PROCEDURE — 93010 ELECTROCARDIOGRAM REPORT: CPT | Mod: ,,, | Performed by: INTERNAL MEDICINE

## 2025-06-18 PROCEDURE — 83735 ASSAY OF MAGNESIUM: CPT | Performed by: STUDENT IN AN ORGANIZED HEALTH CARE EDUCATION/TRAINING PROGRAM

## 2025-06-18 PROCEDURE — 80053 COMPREHEN METABOLIC PANEL: CPT | Performed by: STUDENT IN AN ORGANIZED HEALTH CARE EDUCATION/TRAINING PROGRAM

## 2025-06-18 PROCEDURE — 0202U NFCT DS 22 TRGT SARS-COV-2: CPT

## 2025-06-18 PROCEDURE — 84100 ASSAY OF PHOSPHORUS: CPT | Performed by: EMERGENCY MEDICINE

## 2025-06-18 PROCEDURE — 82728 ASSAY OF FERRITIN: CPT | Performed by: STUDENT IN AN ORGANIZED HEALTH CARE EDUCATION/TRAINING PROGRAM

## 2025-06-18 PROCEDURE — 94640 AIRWAY INHALATION TREATMENT: CPT

## 2025-06-18 PROCEDURE — 94640 AIRWAY INHALATION TREATMENT: CPT | Mod: XB

## 2025-06-18 PROCEDURE — 82800 BLOOD PH: CPT

## 2025-06-18 PROCEDURE — 84484 ASSAY OF TROPONIN QUANT: CPT | Performed by: EMERGENCY MEDICINE

## 2025-06-18 PROCEDURE — 27100171 HC OXYGEN HIGH FLOW UP TO 24 HOURS

## 2025-06-18 PROCEDURE — 83605 ASSAY OF LACTIC ACID: CPT | Mod: 59

## 2025-06-18 PROCEDURE — 20000000 HC ICU ROOM

## 2025-06-18 PROCEDURE — 85025 COMPLETE CBC W/AUTO DIFF WBC: CPT | Performed by: STUDENT IN AN ORGANIZED HEALTH CARE EDUCATION/TRAINING PROGRAM

## 2025-06-18 PROCEDURE — 84100 ASSAY OF PHOSPHORUS: CPT | Performed by: STUDENT IN AN ORGANIZED HEALTH CARE EDUCATION/TRAINING PROGRAM

## 2025-06-18 PROCEDURE — 25000242 PHARM REV CODE 250 ALT 637 W/ HCPCS

## 2025-06-18 PROCEDURE — 83540 ASSAY OF IRON: CPT | Performed by: STUDENT IN AN ORGANIZED HEALTH CARE EDUCATION/TRAINING PROGRAM

## 2025-06-18 PROCEDURE — 83880 ASSAY OF NATRIURETIC PEPTIDE: CPT | Performed by: EMERGENCY MEDICINE

## 2025-06-18 PROCEDURE — 99285 EMERGENCY DEPT VISIT HI MDM: CPT | Mod: 25

## 2025-06-18 PROCEDURE — 94660 CPAP INITIATION&MGMT: CPT

## 2025-06-18 PROCEDURE — 25000242 PHARM REV CODE 250 ALT 637 W/ HCPCS: Performed by: STUDENT IN AN ORGANIZED HEALTH CARE EDUCATION/TRAINING PROGRAM

## 2025-06-18 PROCEDURE — 94761 N-INVAS EAR/PLS OXIMETRY MLT: CPT

## 2025-06-18 PROCEDURE — 96368 THER/DIAG CONCURRENT INF: CPT

## 2025-06-18 PROCEDURE — 63600175 PHARM REV CODE 636 W HCPCS: Performed by: EMERGENCY MEDICINE

## 2025-06-18 PROCEDURE — 99900035 HC TECH TIME PER 15 MIN (STAT)

## 2025-06-18 PROCEDURE — 27000190 HC CPAP FULL FACE MASK W/VALVE

## 2025-06-18 PROCEDURE — 96365 THER/PROPH/DIAG IV INF INIT: CPT

## 2025-06-18 PROCEDURE — 5A09357 ASSISTANCE WITH RESPIRATORY VENTILATION, LESS THAN 24 CONSECUTIVE HOURS, CONTINUOUS POSITIVE AIRWAY PRESSURE: ICD-10-PCS | Performed by: STUDENT IN AN ORGANIZED HEALTH CARE EDUCATION/TRAINING PROGRAM

## 2025-06-18 PROCEDURE — 82803 BLOOD GASES ANY COMBINATION: CPT

## 2025-06-18 PROCEDURE — 83605 ASSAY OF LACTIC ACID: CPT

## 2025-06-18 PROCEDURE — 83735 ASSAY OF MAGNESIUM: CPT | Performed by: EMERGENCY MEDICINE

## 2025-06-18 PROCEDURE — 96376 TX/PRO/DX INJ SAME DRUG ADON: CPT

## 2025-06-18 PROCEDURE — 84132 ASSAY OF SERUM POTASSIUM: CPT

## 2025-06-18 PROCEDURE — 87040 BLOOD CULTURE FOR BACTERIA: CPT

## 2025-06-18 PROCEDURE — 99223 1ST HOSP IP/OBS HIGH 75: CPT | Mod: GC,,, | Performed by: STUDENT IN AN ORGANIZED HEALTH CARE EDUCATION/TRAINING PROGRAM

## 2025-06-18 PROCEDURE — 25000242 PHARM REV CODE 250 ALT 637 W/ HCPCS: Performed by: EMERGENCY MEDICINE

## 2025-06-18 PROCEDURE — 51798 US URINE CAPACITY MEASURE: CPT

## 2025-06-18 PROCEDURE — 25000003 PHARM REV CODE 250: Performed by: STUDENT IN AN ORGANIZED HEALTH CARE EDUCATION/TRAINING PROGRAM

## 2025-06-18 PROCEDURE — 80202 ASSAY OF VANCOMYCIN: CPT | Performed by: STUDENT IN AN ORGANIZED HEALTH CARE EDUCATION/TRAINING PROGRAM

## 2025-06-18 PROCEDURE — 96366 THER/PROPH/DIAG IV INF ADDON: CPT

## 2025-06-18 PROCEDURE — 63600175 PHARM REV CODE 636 W HCPCS

## 2025-06-18 PROCEDURE — 82247 BILIRUBIN TOTAL: CPT | Performed by: STUDENT IN AN ORGANIZED HEALTH CARE EDUCATION/TRAINING PROGRAM

## 2025-06-18 PROCEDURE — 18500000 HC LEAVE OF ABSENCE HOSPITAL SERVICES

## 2025-06-18 PROCEDURE — 99499 UNLISTED E&M SERVICE: CPT | Mod: ICN,,, | Performed by: NURSE PRACTITIONER

## 2025-06-18 PROCEDURE — 36415 COLL VENOUS BLD VENIPUNCTURE: CPT | Performed by: STUDENT IN AN ORGANIZED HEALTH CARE EDUCATION/TRAINING PROGRAM

## 2025-06-18 PROCEDURE — 82330 ASSAY OF CALCIUM: CPT

## 2025-06-18 PROCEDURE — 99900031 HC PATIENT EDUCATION (STAT)

## 2025-06-18 PROCEDURE — 63600175 PHARM REV CODE 636 W HCPCS: Performed by: STUDENT IN AN ORGANIZED HEALTH CARE EDUCATION/TRAINING PROGRAM

## 2025-06-18 PROCEDURE — 25000003 PHARM REV CODE 250: Performed by: EMERGENCY MEDICINE

## 2025-06-18 PROCEDURE — 96361 HYDRATE IV INFUSION ADD-ON: CPT

## 2025-06-18 PROCEDURE — 96375 TX/PRO/DX INJ NEW DRUG ADDON: CPT

## 2025-06-18 PROCEDURE — 93005 ELECTROCARDIOGRAM TRACING: CPT

## 2025-06-18 PROCEDURE — 84145 PROCALCITONIN (PCT): CPT

## 2025-06-18 PROCEDURE — 63600175 PHARM REV CODE 636 W HCPCS: Mod: JZ,TB

## 2025-06-18 PROCEDURE — 94799 UNLISTED PULMONARY SVC/PX: CPT

## 2025-06-18 PROCEDURE — 85025 COMPLETE CBC W/AUTO DIFF WBC: CPT | Performed by: EMERGENCY MEDICINE

## 2025-06-18 PROCEDURE — 84460 ALANINE AMINO (ALT) (SGPT): CPT | Performed by: EMERGENCY MEDICINE

## 2025-06-18 PROCEDURE — 94761 N-INVAS EAR/PLS OXIMETRY MLT: CPT | Mod: XB

## 2025-06-18 PROCEDURE — 5A0935A ASSISTANCE WITH RESPIRATORY VENTILATION, LESS THAN 24 CONSECUTIVE HOURS, HIGH NASAL FLOW/VELOCITY: ICD-10-PCS | Performed by: STUDENT IN AN ORGANIZED HEALTH CARE EDUCATION/TRAINING PROGRAM

## 2025-06-18 PROCEDURE — 84295 ASSAY OF SERUM SODIUM: CPT

## 2025-06-18 PROCEDURE — 99291 CRITICAL CARE FIRST HOUR: CPT | Mod: 25,GC,, | Performed by: EMERGENCY MEDICINE

## 2025-06-18 PROCEDURE — 85014 HEMATOCRIT: CPT

## 2025-06-18 RX ORDER — IPRATROPIUM BROMIDE 0.5 MG/2.5ML
0.5 SOLUTION RESPIRATORY (INHALATION)
Status: COMPLETED | OUTPATIENT
Start: 2025-06-18 | End: 2025-06-18

## 2025-06-18 RX ORDER — LEVETIRACETAM 500 MG/5ML
500 INJECTION, SOLUTION, CONCENTRATE INTRAVENOUS EVERY 12 HOURS
Status: DISCONTINUED | OUTPATIENT
Start: 2025-06-18 | End: 2025-06-19

## 2025-06-18 RX ORDER — HEPARIN SODIUM,PORCINE/D5W 25000/250
0-40 INTRAVENOUS SOLUTION INTRAVENOUS CONTINUOUS
Status: DISCONTINUED | OUTPATIENT
Start: 2025-06-18 | End: 2025-06-18

## 2025-06-18 RX ORDER — LEVALBUTEROL INHALATION SOLUTION 0.63 MG/3ML
0.63 SOLUTION RESPIRATORY (INHALATION)
Status: DISCONTINUED | OUTPATIENT
Start: 2025-06-18 | End: 2025-06-18

## 2025-06-18 RX ORDER — LEVALBUTEROL 1.25 MG/.5ML
1.25 SOLUTION, CONCENTRATE RESPIRATORY (INHALATION) EVERY 4 HOURS
Status: DISCONTINUED | OUTPATIENT
Start: 2025-06-18 | End: 2025-06-22

## 2025-06-18 RX ORDER — LEVALBUTEROL 1.25 MG/.5ML
1.25 SOLUTION, CONCENTRATE RESPIRATORY (INHALATION)
Status: COMPLETED | OUTPATIENT
Start: 2025-06-18 | End: 2025-06-18

## 2025-06-18 RX ORDER — PANTOPRAZOLE SODIUM 40 MG/10ML
40 INJECTION, POWDER, LYOPHILIZED, FOR SOLUTION INTRAVENOUS DAILY
Status: DISCONTINUED | OUTPATIENT
Start: 2025-06-19 | End: 2025-06-19

## 2025-06-18 RX ORDER — NALOXONE HCL 0.4 MG/ML
0.02 VIAL (ML) INJECTION
Status: DISCONTINUED | OUTPATIENT
Start: 2025-06-18 | End: 2025-06-22 | Stop reason: HOSPADM

## 2025-06-18 RX ORDER — IBUPROFEN 200 MG
16 TABLET ORAL
Status: DISCONTINUED | OUTPATIENT
Start: 2025-06-18 | End: 2025-06-22 | Stop reason: HOSPADM

## 2025-06-18 RX ORDER — GLUCAGON 1 MG
1 KIT INJECTION
Status: DISCONTINUED | OUTPATIENT
Start: 2025-06-18 | End: 2025-06-22 | Stop reason: HOSPADM

## 2025-06-18 RX ORDER — DILTIAZEM HYDROCHLORIDE 60 MG/1
60 TABLET, FILM COATED ORAL EVERY 8 HOURS
Status: DISCONTINUED | OUTPATIENT
Start: 2025-06-18 | End: 2025-06-18

## 2025-06-18 RX ORDER — LEVETIRACETAM 500 MG/1
500 TABLET ORAL 2 TIMES DAILY
Status: DISCONTINUED | OUTPATIENT
Start: 2025-06-18 | End: 2025-06-18

## 2025-06-18 RX ORDER — HEPARIN SODIUM,PORCINE/D5W 25000/250
0-40 INTRAVENOUS SOLUTION INTRAVENOUS CONTINUOUS
Status: DISCONTINUED | OUTPATIENT
Start: 2025-06-19 | End: 2025-06-20

## 2025-06-18 RX ORDER — ASPIRIN 325 MG
325 TABLET ORAL
Status: COMPLETED | OUTPATIENT
Start: 2025-06-18 | End: 2025-06-18

## 2025-06-18 RX ORDER — DEXMEDETOMIDINE HYDROCHLORIDE 4 UG/ML
INJECTION, SOLUTION INTRAVENOUS
Status: COMPLETED
Start: 2025-06-18 | End: 2025-06-18

## 2025-06-18 RX ORDER — SODIUM CHLORIDE 0.9 % (FLUSH) 0.9 %
10 SYRINGE (ML) INJECTION EVERY 12 HOURS PRN
Status: DISCONTINUED | OUTPATIENT
Start: 2025-06-18 | End: 2025-06-22 | Stop reason: HOSPADM

## 2025-06-18 RX ORDER — CEFEPIME HYDROCHLORIDE 2 G/1
2 INJECTION, POWDER, FOR SOLUTION INTRAVENOUS
Status: DISCONTINUED | OUTPATIENT
Start: 2025-06-18 | End: 2025-06-20

## 2025-06-18 RX ORDER — DILTIAZEM HYDROCHLORIDE 5 MG/ML
25 INJECTION INTRAVENOUS ONCE
Status: COMPLETED | OUTPATIENT
Start: 2025-06-18 | End: 2025-06-18

## 2025-06-18 RX ORDER — POLYETHYLENE GLYCOL 3350 17 G/17G
17 POWDER, FOR SOLUTION ORAL DAILY
Status: DISCONTINUED | OUTPATIENT
Start: 2025-06-18 | End: 2025-06-18

## 2025-06-18 RX ORDER — LEVALBUTEROL 1.25 MG/.5ML
1.25 SOLUTION, CONCENTRATE RESPIRATORY (INHALATION) EVERY 4 HOURS
Status: DISCONTINUED | OUTPATIENT
Start: 2025-06-18 | End: 2025-06-18

## 2025-06-18 RX ORDER — CEFEPIME HYDROCHLORIDE 1 G/1
1 INJECTION, POWDER, FOR SOLUTION INTRAMUSCULAR; INTRAVENOUS
Status: DISCONTINUED | OUTPATIENT
Start: 2025-06-18 | End: 2025-06-18

## 2025-06-18 RX ORDER — IPRATROPIUM BROMIDE 0.5 MG/2.5ML
0.5 SOLUTION RESPIRATORY (INHALATION) EVERY 4 HOURS
Status: DISCONTINUED | OUTPATIENT
Start: 2025-06-18 | End: 2025-06-22 | Stop reason: HOSPADM

## 2025-06-18 RX ORDER — IBUPROFEN 200 MG
24 TABLET ORAL
Status: DISCONTINUED | OUTPATIENT
Start: 2025-06-18 | End: 2025-06-22 | Stop reason: HOSPADM

## 2025-06-18 RX ORDER — DILTIAZEM HYDROCHLORIDE 5 MG/ML
25 INJECTION INTRAVENOUS
Status: COMPLETED | OUTPATIENT
Start: 2025-06-18 | End: 2025-06-18

## 2025-06-18 RX ORDER — AMOXICILLIN 250 MG
1 CAPSULE ORAL DAILY PRN
Status: DISCONTINUED | OUTPATIENT
Start: 2025-06-18 | End: 2025-06-22 | Stop reason: HOSPADM

## 2025-06-18 RX ORDER — ATORVASTATIN CALCIUM 40 MG/1
80 TABLET, FILM COATED ORAL DAILY
Status: DISCONTINUED | OUTPATIENT
Start: 2025-06-18 | End: 2025-06-22 | Stop reason: HOSPADM

## 2025-06-18 RX ORDER — ACETAMINOPHEN 325 MG/1
650 TABLET ORAL EVERY 8 HOURS PRN
Status: DISCONTINUED | OUTPATIENT
Start: 2025-06-18 | End: 2025-06-22 | Stop reason: HOSPADM

## 2025-06-18 RX ORDER — PANTOPRAZOLE SODIUM 40 MG/1
40 TABLET, DELAYED RELEASE ORAL DAILY
Status: DISCONTINUED | OUTPATIENT
Start: 2025-06-18 | End: 2025-06-18

## 2025-06-18 RX ORDER — DEXMEDETOMIDINE HYDROCHLORIDE 4 UG/ML
0-1.4 INJECTION, SOLUTION INTRAVENOUS CONTINUOUS
Status: DISCONTINUED | OUTPATIENT
Start: 2025-06-18 | End: 2025-06-19

## 2025-06-18 RX ORDER — LEVOFLOXACIN 5 MG/ML
750 INJECTION, SOLUTION INTRAVENOUS
Status: DISCONTINUED | OUTPATIENT
Start: 2025-06-18 | End: 2025-06-22 | Stop reason: HOSPADM

## 2025-06-18 RX ORDER — ATORVASTATIN CALCIUM 40 MG/1
40 TABLET, FILM COATED ORAL DAILY
Status: DISCONTINUED | OUTPATIENT
Start: 2025-06-18 | End: 2025-06-18

## 2025-06-18 RX ORDER — DILTIAZEM HYDROCHLORIDE 5 MG/ML
25 INJECTION INTRAVENOUS ONCE
Status: DISCONTINUED | OUTPATIENT
Start: 2025-06-18 | End: 2025-06-18

## 2025-06-18 RX ORDER — MUPIROCIN 20 MG/G
OINTMENT TOPICAL 2 TIMES DAILY
Status: DISCONTINUED | OUTPATIENT
Start: 2025-06-19 | End: 2025-06-22 | Stop reason: HOSPADM

## 2025-06-18 RX ADMIN — DEXMEDETOMIDINE HYDROCHLORIDE 0.6 MCG/KG/HR: 4 INJECTION, SOLUTION INTRAVENOUS at 01:06

## 2025-06-18 RX ADMIN — DILTIAZEM HYDROCHLORIDE 25 MG: 5 INJECTION, SOLUTION INTRAVENOUS at 07:06

## 2025-06-18 RX ADMIN — CEFEPIME 1 G: 1 INJECTION, POWDER, FOR SOLUTION INTRAMUSCULAR; INTRAVENOUS at 10:06

## 2025-06-18 RX ADMIN — LEVALBUTEROL 1.25 MG: 1.25 SOLUTION, CONCENTRATE RESPIRATORY (INHALATION) at 11:06

## 2025-06-18 RX ADMIN — IPRATROPIUM BROMIDE 0.5 MG: 0.5 SOLUTION RESPIRATORY (INHALATION) at 12:06

## 2025-06-18 RX ADMIN — LEVETIRACETAM 500 MG: 500 TABLET, FILM COATED ORAL at 12:06

## 2025-06-18 RX ADMIN — LEVALBUTEROL HYDROCHLORIDE 1.25 MG: 0.63 SOLUTION RESPIRATORY (INHALATION) at 04:06

## 2025-06-18 RX ADMIN — LEVALBUTEROL HYDROCHLORIDE 1.25 MG: 0.63 SOLUTION RESPIRATORY (INHALATION) at 12:06

## 2025-06-18 RX ADMIN — NITROGLYCERIN 0.4 MG: 0.4 TABLET SUBLINGUAL at 07:06

## 2025-06-18 RX ADMIN — VANCOMYCIN HYDROCHLORIDE 1250 MG: 1.25 INJECTION, POWDER, LYOPHILIZED, FOR SOLUTION INTRAVENOUS at 12:06

## 2025-06-18 RX ADMIN — ASPIRIN 325 MG ORAL TABLET 325 MG: 325 PILL ORAL at 09:06

## 2025-06-18 RX ADMIN — LEVALBUTEROL 1.25 MG: 1.25 SOLUTION, CONCENTRATE RESPIRATORY (INHALATION) at 03:06

## 2025-06-18 RX ADMIN — HYDROXYZINE HYDROCHLORIDE 25 MG: 25 TABLET, FILM COATED ORAL at 06:06

## 2025-06-18 RX ADMIN — DILTIAZEM HYDROCHLORIDE 60 MG: 60 TABLET, FILM COATED ORAL at 12:06

## 2025-06-18 RX ADMIN — HYDROCORTISONE SODIUM SUCCINATE 50 MG: 100 INJECTION, POWDER, FOR SOLUTION INTRAMUSCULAR; INTRAVENOUS at 11:06

## 2025-06-18 RX ADMIN — SODIUM CHLORIDE, POTASSIUM CHLORIDE, SODIUM LACTATE AND CALCIUM CHLORIDE 1000 ML: 600; 310; 30; 20 INJECTION, SOLUTION INTRAVENOUS at 07:06

## 2025-06-18 RX ADMIN — CEFEPIME 2 G: 2 INJECTION, POWDER, FOR SOLUTION INTRAVENOUS at 06:06

## 2025-06-18 RX ADMIN — PIPERACILLIN AND TAZOBACTAM 4.5 G: 4; .5 INJECTION, POWDER, FOR SOLUTION INTRAVENOUS at 05:06

## 2025-06-18 RX ADMIN — IPRATROPIUM BROMIDE 0.5 MG: 0.5 SOLUTION RESPIRATORY (INHALATION) at 08:06

## 2025-06-18 RX ADMIN — LEVALBUTEROL 1.25 MG: 1.25 SOLUTION, CONCENTRATE RESPIRATORY (INHALATION) at 08:06

## 2025-06-18 RX ADMIN — POTASSIUM PHOSPHATE, MONOBASIC POTASSIUM PHOSPHATE, DIBASIC 30 MMOL: 224; 236 INJECTION, SOLUTION, CONCENTRATE INTRAVENOUS at 04:06

## 2025-06-18 RX ADMIN — ATORVASTATIN CALCIUM 80 MG: 40 TABLET, FILM COATED ORAL at 11:06

## 2025-06-18 RX ADMIN — METHYLPREDNISOLONE SODIUM SUCCINATE 40 MG: 40 INJECTION, POWDER, FOR SOLUTION INTRAMUSCULAR; INTRAVENOUS at 04:06

## 2025-06-18 RX ADMIN — DILTIAZEM HYDROCHLORIDE 10 MG/HR: 5 INJECTION, SOLUTION INTRAVENOUS at 01:06

## 2025-06-18 RX ADMIN — LEVETIRACETAM 500 MG: 500 INJECTION, SOLUTION, CONCENTRATE INTRAVENOUS at 10:06

## 2025-06-18 RX ADMIN — LEVOFLOXACIN 750 MG: 5 INJECTION, SOLUTION INTRAVENOUS at 10:06

## 2025-06-18 RX ADMIN — APIXABAN 5 MG: 5 TABLET, FILM COATED ORAL at 11:06

## 2025-06-18 RX ADMIN — DILTIAZEM HYDROCHLORIDE 25 MG: 5 INJECTION, SOLUTION INTRAVENOUS at 12:06

## 2025-06-18 RX ADMIN — IPRATROPIUM BROMIDE 0.5 MG: 0.5 SOLUTION RESPIRATORY (INHALATION) at 03:06

## 2025-06-18 NOTE — PLAN OF CARE
[facility] - Emergency Dept  Discharge Assessment    Primary Care Provider: Sierra Landry MD     Pt is currently on a leave of absence from [facility] until 06/26 midnight.  Pt is amenable to returning to [facility] when medically ready for d/c from [facility].  Pt requested SW contact his daughter Marilou [phone] to let her know he came into [facility] from the [facility].  Pt stated daughter is in [place] and will be returning home.  SW contacted daughter and left message at 1150hrs.      Pt to return to [facility] on d./c     Discharge Assessment (most recent)       BRIEF DISCHARGE ASSESSMENT - 06/18/25 1151          Discharge Planning    Assessment Type Discharge Planning Brief Assessment (P)      Resource/Environmental Concerns none (P)      Support Systems Family members (P)      Equipment Currently Used at Home other (see comments) (P)    pt is on an ANH from [facility]    Current Living Arrangements extended care facility (P)      Care Facility Name [facility] (P)      Patient/Family Anticipates Transition to other (see comments) (P)    LTAC    Patient/Family Anticipated Services at Transition none (P)      DME Needed Upon Discharge  none (P)      Discharge Plan A Long-term acute care facility (LTAC) (P)      Discharge Plan B Long-term acute care facility (LTAC) (P)                    Discharge Plan A and Plan B have been determined by review of patient's clinical status, future medical and therapeutic needs, and coverage/benefits for post-acute care in coordination with multidisciplinary team members.    Anjelica Levy, JAMILAH, MSW, LMSW, RSW   Case Management  [facility]  Email: [email or web address]

## 2025-06-18 NOTE — NURSING
Pt c/o acute chest pain.  Nitro x2 given, pt still c/o pain !2 lead EKG done, Reyna Woods NP notified, /68, O2sat 88-92% on 15l O2 per NC.  eunice Hawk am called for emergent transfer to [facility] main ED, report called to Shabana LEONG.

## 2025-06-18 NOTE — ED NOTES
"Pt having increased WOB stating "I can't take this anymore, intubate me". Respiratory at bedside. MD notified.   "

## 2025-06-18 NOTE — CONSULTS
Pharmacokinetic Initial Assessment: IV Vancomycin    Assessment/Plan:    -Patient on vancomycin prior to arrival  -Vancomycin random 9.6, ~12 hours post last vancomycin 1000 mg x1, below goal of 15 - 20 for PNA  -Increase vancomycin to 1250 mg IVPB Q12H  -Next trough 06/20 @ 1100    Please contact pharmacy at extension [phone] with any questions regarding this assessment.     Thank you for the consult,   Jigar Mauro       Patient brief summary:  Jordin Allen Jr. is a 79 y.o. male initiated on antimicrobial therapy with IV Vancomycin for treatment of suspected lower respiratory infection    Drug Allergies:   Review of patient's allergies indicates:   Allergen Reactions    Brilinta [ticagrelor] Itching    Lisinopril      Other reaction(s): cough    Metoprolol succinate Rash       Actual Body Weight:   91 kg    Renal Function:   Estimated Creatinine Clearance: 98.4 mL/min (based on SCr of 0.7 mg/dL).,     Dialysis Method (if applicable):  N/A    CBC (last 72 hours):  Recent Labs   Lab Result Units 06/16/25 0429 06/18/25 0514 06/18/25  0754   WBC K/uL 13.54* 10.74 17.04*   HGB gm/dL 10.6* 11.7* 11.7*   HCT % 33.8* 36.1* 35.6*   Platelet Count K/uL 125* 98* 240   Lymph % %  --  3.6* 5.3*   Mono % %  --  8.2 7.5   Monocyte % % 1.0*  --   --    Eos % %  --  2.5 2.1   Basophil % %  --  0.3 0.2       Metabolic Panel (last 72 hours):  Recent Labs   Lab Result Units 06/16/25 0429 06/18/25  0514 06/18/25  0754   Sodium mmol/L 142 136 138   Potassium mmol/L 3.8 3.5 3.3*   Chloride mmol/L 106 103 100   CO2 mmol/L 27 22* 27   Glucose mg/dL 137* 106 129*   BUN mg/dL 21 18 19   Creatinine mg/dL 0.8 0.7 0.7   Albumin g/dL 2.0* 1.8* 2.0*   Bilirubin Total mg/dL 0.6 0.6 0.8   ALP unit/L 104 103 120   AST unit/L 25 25 20   ALT unit/L 75* 51* 52*   Magnesium  mg/dL 1.8 1.9 1.9   Phosphorus Level mg/dL 2.4* 2.4* 1.8*       Drug levels (last 3 results):  Recent Labs   Lab Result Units 06/18/25  0948   Vancomycin Random ug/ml 9.6        Microbiologic Results:  Microbiology Results (last 7 days)       ** No results found for the last 168 hours. **

## 2025-06-18 NOTE — RESPIRATORY THERAPY
Called for help to Patient's room, upon arrival patient stated he's having chest pain and SOB, O2 Sat 92%, 's, RR 32. RT and Nurse in room, EKG was done and Patient was on 10L high flow nasal cannula, increased to 12L. O2 Sat in the 80's a few minutes later, increased to 15L. NP notified and EMS was called, patient was transferred to hospital on non-rebreather mask.

## 2025-06-18 NOTE — ASSESSMENT & PLAN NOTE
Stage IV NSCLC (cT3 cN1 M1b) adenocarcinoma Completed treatment with chemo/XRT. Brain XRT for lesionsx2. off immunotherapy since 12/24. Follows with Dr. Ponce outpatient

## 2025-06-18 NOTE — Clinical Note
Diagnosis: Atrial fibrillation with RVR [298516]   Reason for IP Medical Treatment  (Clinical interventions that can only be accomplished in the IP setting? ) :: respiratorty failure

## 2025-06-18 NOTE — H&P
[facility] - Emergency Dept  [facility] Medicine  History & Physical    Patient Name: Jordin Allen Jr.  MRN: [id number]  Patient Class: OP- Observation  Admission Date: 6/18/2025  Attending Physician: Linda Nguyễn MD   Primary Care Provider: Sierra Landry MD         Patient information was obtained from patient and ER records.     Subjective:     Principal Problem:Acute on chronic respiratory failure with hypoxia    Chief Complaint:   Chief Complaint   Patient presents with    Chest Pain        HPI: 79M PMHx COPD(4L), SHOLA, HTN, CAD, left lung ca (brain mets) s/p chemo/RT/immuno (12/24), GERD, HLD presenting from LTAC with chest pain, shortness of breath, and tachycardia. He was discharged 2 weeks ago after hospitalization for hypoxic respiratory failure due to parainfluenza pneumonia and COPD exacerbation, during which he was started on Apixaban and Diltiazem. Since returning to LTAC, he had persistent respiratory symptoms but acutely developed severe (9/10) chest pain, worsening SOB, and rapid heart rate this morning. Despite being on Diltiazem, he remained tachycardic, and EMS was called. En route, he became hypotensive and was electrically cardioverted, with only transient slowing of rate. He is currently on Cefepime, Vancomycin and levaquin for pneumonia.  Of note, he has [place] [facility] [place] in [place] last month for continued SOB and COPD exacerbation. Admitted to  for further management      Past Medical History:   Diagnosis Date    Benign neoplasm of colon 10/01/2013    Bronchitis chronic     CAD (coronary artery disease) 10/31/2013    Cataract 2008    COPD (chronic obstructive pulmonary disease)     Emphysema of lung     Encounter for preventive health examination 03/21/2018    GERD (gastroesophageal reflux disease)     Glaucoma 2010    Hearing loss 2015    Heart attack 2013    Hx of colonic polyps     Hypertension     NAFLD (nonalcoholic fatty liver disease) 03/27/2017    SHOLA on CPAP     RLS  (restless legs syndrome)     Screen for colon cancer 08/30/2013       Past Surgical History:   Procedure Laterality Date    bilateral foot surgery  1993    CARDIAC CATHETERIZATION  2013    x1    CATARACT EXTRACTION, BILATERAL      COLON SURGERY  2013    Ace Mott MD    COLONOSCOPY N/A 03/21/2018    Procedure: COLONOSCOPY;  Surgeon: Terry Mott MD;  Location: [facility] ENDO (4TH FLR);  Service: Endoscopy;  Laterality: N/A;    COLONOSCOPY N/A 04/12/2019    Procedure: COLONOSCOPY;  Surgeon: John Mantilla MD;  Location: [facility] ENDO (4TH FLR);  Service: Endoscopy;  Laterality: N/A;    COLONOSCOPY N/A 05/31/2022    Procedure: COLONOSCOPY;  Surgeon: MEDINA Farris MD;  Location: [facility] ENDO (4TH FLR);  Service: Endoscopy;  Laterality: N/A;  fully vacc-inst portal-tb    ENDOBRONCHIAL ULTRASOUND Bilateral 04/30/2024    Procedure: ENDOBRONCHIAL ULTRASOUND (EBUS);  Surgeon: Naveed Aguero MD;  Location: [facility] OR;  Service: Pulmonary;  Laterality: Bilateral;    EYE SURGERY  2011    scrotal abscess removal      TYMPANOSTOMY TUBE PLACEMENT  2017       Review of patient's allergies indicates:   Allergen Reactions    Brilinta [ticagrelor] Itching    Lisinopril      Other reaction(s): cough    Metoprolol succinate Rash       Current Facility-Administered Medications on File Prior to Encounter   Medication    [ - Suspended Admission] acetaminophen tablet 650 mg    [ - Suspended Admission] albuterol nebulizer solution 0.6667 mg    [ - Suspended Admission] amitriptyline tablet 25 mg    [ - Suspended Admission] ammonium lactate 12 % lotion    [ - Suspended Admission] apixaban tablet 5 mg    [ - Suspended Admission] aspirin EC tablet 81 mg    [ - Suspended Admission] atorvastatin tablet 80 mg    [ - Suspended Admission] benzonatate capsule 100 mg    [ - Suspended Admission] calcium carbonate 200 mg calcium (500 mg) chewable tablet 500 mg    dextrose 50% injection 12.5 g    dextrose 50% injection 25 g    [ - Suspended Admission]  diltiaZEM (CARDIZEM) 125 mg in 0.9% NaCl 125 mL infusion    [ - Suspended Admission] fluticasone propionate 50 mcg/actuation nasal spray 100 mcg    [COMPLETED] furosemide injection 20 mg    [ - Suspended Admission] guaiFENesin 12 hr tablet 1,200 mg    [ - Suspended Admission] hydrOXYzine HCL tablet 25 mg    insulin regular injection 0-8 Units    [ - Suspended Admission] levalbuterol nebulizer solution 1.2495 mg    [ - Suspended Admission] levETIRAcetam tablet 500 mg    [ - Suspended Admission] melatonin tablet 6 mg    [ - Suspended Admission] methocarbamoL tablet 500 mg    [ - Suspended Admission] mirtazapine tablet 15 mg    [ - Suspended Admission] naloxone 0.4 mg/mL injection 0.02 mg    [ - Suspended Admission] nitroGLYCERIN SL tablet 0.4 mg    [ - Suspended Admission] ondansetron disintegrating tablet 4 mg    [ - Suspended Admission] oxyCODONE-acetaminophen 5-325 mg per tablet 1 tablet    [ - Suspended Admission] pantoprazole EC tablet 40 mg    [ - Suspended Admission] piperacillin-tazobactam 4.5 g in sodium chloride 0.9% 100 mL IVPB (ready to mix)    [ - Suspended Admission] polyethylene glycol packet 17 g    [ - Suspended Admission] roflumilast (DALIRESP) tablet 500 mcg    [COMPLETED] sodium chloride 0.9% bolus 500 mL 500 mL    [ - Suspended Admission] sodium chloride 0.9% flush 10 mL    [ - Suspended Admission] sodium chloride 3% nebulizer solution 4 mL    [ - Suspended Admission] tiotropium bromide 2.5 mcg/actuation inhaler 2 puff    [ - Suspended Admission] vancomycin - pharmacy to dose    [ - Suspended Admission] vancomycin 1 g in 0.9% sodium chloride 250 mL IVPB (ready to mix)    [DISCONTINUED] diltiaZEM (CARDIZEM) 125 mg in 0.9% NaCl 125 mL infusion     Current Outpatient Medications on File Prior to Encounter   Medication Sig    amitriptyline (ELAVIL) 25 MG tablet Take 1 tablet (25 mg total) by mouth once daily. (Patient taking differently: Take 25 mg by mouth every evening.)    apixaban (ELIQUIS) 5 mg  Tab Take 1 tablet (5 mg total) by mouth 2 (two) times daily.    aspirin (ECOTRIN) 81 MG EC tablet Take 81 mg by mouth every morning.    atorvastatin (LIPITOR) 80 MG tablet Take 1 tablet (80 mg total) by mouth in the morning.    budesonide-glycopyr-formoterol (BREZTRI AEROSPHERE) 160-9-4.8 mcg/actuation HFAA Inhale 2 puffs into the lungs 2 (two) times a day.    fluticasone propionate (FLONASE) 50 mcg/actuation nasal spray Spray 2 sprays (100 mcg total) in Each Nostril once daily.    ipratropium (ATROVENT) 0.02 % nebulizer solution Take 2.5 mLs (500 mcg total) by nebulization every 6 (six) hours as needed for Wheezing. Rescue    levETIRAcetam (KEPPRA) 500 MG Tab Take 1 tablet (500 mg total) by mouth 2 (two) times daily.    losartan (COZAAR) 100 MG tablet Take 1 tablet (100 mg total) by mouth once daily.    omeprazole (PRILOSEC) 20 MG capsule Take 20 mg by mouth once daily.    roflumilast (DALIRESP) 500 mcg Tab Take 500 mcg by mouth every morning.    traZODone (DESYREL) 50 MG tablet Take 1 tablet (50 mg total) by mouth every evening.    0.9% NaCl SolP 250 mL with vancomycin 1,000 mg SolR 1,000 mg Inject 1,000 mg into the vein every 12 (twelve) hours.    acetaminophen (TYLENOL) 500 MG tablet Take 500 mg by mouth 2 (two) times daily as needed for Pain.    benzonatate (TESSALON) 100 MG capsule Take 1 capsule (100 mg total) by mouth 3 (three) times daily as needed for Cough.    BETA-CAROTENE,A, W-C & E/MIN (OCUVITE ORAL) Take 1 capsule by mouth once daily.     D5W PgBk 100 mL with piperacillin-tazobactam 4.5 gram SolR 4.5 g Inject 4.5 g into the vein every 8 (eight) hours. for 7 days    dextrose 50% injection Inject 25 mLs (12.5 g total) into the vein as needed (between 50 - 70 mg/dL if patient cannnot take PO but has IV access.).    dextrose 50% injection Inject 50 mLs (25 g total) into the vein as needed (less than 50 mg/dL if patient cannnot take PO but has IV access.).    diltiaZEM (CARDIZEM CD) 180 MG 24 hr capsule  Take 1 capsule (180 mg total) by mouth once daily.    echinacea 400 mg Cap Take 1 capsule by mouth once daily.     glucose 4 GM chewable tablet Take 4 tablets (16 g total) by mouth as needed.    glucose 4 GM chewable tablet Take 6 tablets (24 g total) by mouth as needed.    guaiFENesin (MUCINEX) 600 mg 12 hr tablet Take 1 tablet (600 mg total) by mouth 2 (two) times daily. for 10 days    latanoprost 0.005 % ophthalmic solution Instill 1 drop into both eyes every night at bedtime    polyethylene glycol (GLYCOLAX) 17 gram/dose powder Take 17 grams by mouth every day for constipation prevention     Family History       Problem Relation (Age of Onset)    Diabetes Maternal Grandmother    Heart attack Mother    Heart disease Mother    Hypertension Mother    No Known Problems Sister, Daughter          Tobacco Use    Smoking status: Former     Current packs/day: 0.00     Average packs/day: 1 pack/day for 40.0 years (40.0 ttl pk-yrs)     Types: Cigarettes     Start date: 8/15/1972     Quit date: 8/15/2012     Years since quittin.8     Passive exposure: Never    Smokeless tobacco: Never   Substance and Sexual Activity    Alcohol use: Yes     Alcohol/week: 3.0 standard drinks of alcohol     Types: 3 Cans of beer per week     Comment: maybe 2-3  beers weekly    Drug use: No    Sexual activity: Yes     Partners: Female     Review of Systems   Constitutional:  Negative for activity change, chills and fever.   HENT:  Negative for trouble swallowing.    Eyes:  Negative for photophobia and visual disturbance.   Respiratory:  Positive for cough, shortness of breath and wheezing. Negative for chest tightness.    Cardiovascular:  Positive for chest pain. Negative for palpitations and leg swelling.   Gastrointestinal:  Negative for abdominal pain, constipation, diarrhea, nausea and vomiting.   Genitourinary:  Negative for dysuria, frequency, hematuria and urgency.   Musculoskeletal:  Negative for arthralgias, back pain and gait  "problem.   Skin:  Negative for color change and rash.   Neurological:  Negative for dizziness, syncope, weakness, light-headedness, numbness and headaches.   Psychiatric/Behavioral:  Negative for agitation and confusion. The patient is not nervous/anxious.      Objective:     Vital Signs (Most Recent):  Temp: 98.3 °F (36.8 °C) (06/18/25 1115)  Pulse: 101 (06/18/25 1137)  Resp: (!) 26 (06/18/25 1137)  BP: (!) 107/58 (06/18/25 1115)  SpO2: (!) 94 % (06/18/25 1137) Vital Signs (24h Range):  Temp:  [97.2 °F (36.2 °C)-98.3 °F (36.8 °C)] 98.3 °F (36.8 °C)  Pulse:  [] 101  Resp:  [16-41] 26  SpO2:  [85 %-100 %] 94 %  BP: ()/(56-72) 107/58     Weight: 90.7 kg (200 lb)  Body mass index is 29.53 kg/m².     Physical Exam           Significant Labs: All pertinent labs within the past 24 hours have been reviewed.  ABGs:   Recent Labs   Lab 06/18/25  0759 06/18/25  1200   PH 7.542* 7.407   PCO2 33.2* 49.3*   HCO3 29.2 28.5*   PO2 29.3* 30.8*     Blood Culture: No results for input(s): "LABBLOO" in the last 48 hours.  CBC:   Recent Labs   Lab 06/18/25  0514 06/18/25  0754 06/18/25 0759   WBC 10.74 17.04*  --    HGB 11.7* 11.7*  --    HCT 36.1* 35.6* 40.2   PLT 98* 240  --      CMP:   Recent Labs   Lab 06/18/25  0514 06/18/25  0754    138   K 3.5 3.3*    100   CO2 22* 27    129*   BUN 18 19   CREATININE 0.7 0.7   CALCIUM 8.5* 9.0   PROT 5.1* 5.8*   ALBUMIN 1.8* 2.0*   BILITOT 0.6 0.8   ALKPHOS 103 120   AST 25 20   ALT 51* 52*   ANIONGAP 11 11     Lactic Acid:   Recent Labs   Lab 06/18/25  1155   LACTATE 1.2       Significant Imaging: I have reviewed all pertinent imaging results/findings within the past 24 hours.  Assessment/Plan:     Assessment & Plan  GERD (gastroesophageal reflux disease)  - continue hospital formulary alternative PPI  HTN (hypertension), benign  Patient's blood pressure range in the last 24 hours was: BP  Min: 90/63  Max: 130/63.The patient's inpatient anti-hypertensive regimen " is listed below:  Current Antihypertensives  diltiaZEM (CARDIZEM) 125 mg in 0.9% NaCl 125 mL infusion, Continuous, Intravenous    Plan  - BP is controlled, no changes needed to their regimen    CAD (coronary artery disease)  Patient with known history of  CAD s/p stent placement and CABG, which is controlled Will continue ASA and Statin and monitor for S/Sx of angina/ACS. Continue to monitor on telemetry.   COPD with acute exacerbation  Patient's COPD is with exacerbation noted by continued dyspnea, use of accessory muscles for breathing, and worsening of baseline hypoxia currently.  Patient is currently on COPD Pathway. Continue scheduled inhalers Steroids, Antibiotics, and Supplemental oxygen and monitor respiratory status closely.   Recent steroid taper completed--possible new exacerbation      Plan:  - Re-initiate systemic steroids; Hydrocort 50 mg Q6H  - Stop Albuterol and   - Start Levalbutrerol with Ipratropium  - Continue Abx( cefrepime, Vancomycin  - Monitor for worsening hypercapnia, repeat ABG  as indicated      Dyslipidemia  Continue  Atorvastatin    Acute on chronic respiratory failure with hypoxia  Likely due to worsening pneumonia +/- COPD exacerbation. HFNC 60?L/40% FiO?, sats 89% -- borderline; watch for signs of fatigue     Current antimicrobial regimen consists of the antibiotics listed below. Will monitor patient closely and continue current treatment plan unchanged.    Antibiotics (From admission, onward)      Start     Stop Route Frequency Ordered    06/18/25 1930  ceFEPIme injection 2 g         -- IV Every 8 hours (non-standard times) 06/18/25 1123    06/18/25 1200  vancomycin 1,250 mg in 0.9% NaCl 250 mL IVPB (admixture device)         -- IV Every 12 hours (non-standard times) 06/18/25 1104    06/18/25 1145  levoFLOXacin 750 mg/150 mL IVPB 750 mg         -- IV Every 24 hours (non-standard times) 06/18/25 1034    06/18/25 1130  vancomycin - pharmacy to dose  (vancomycin IVPB (PEDS and ADULTS))  "       Placed in "And" Linked Group    -- IV pharmacy to manage frequency 06/18/25 1034            Microbiology Results (last 7 days)       Procedure Component Value Units Date/Time    Culture, Respiratory with Gram Stain [[id number]]     Order Status: Sent Specimen: Sputum, Expectorated              Current antimicrobial regimen consists of the antibiotics listed below. Will monitor patient closely and continue current treatment plan unchanged.    Antibiotics (From admission, onward)      Start     Stop Route Frequency Ordered    06/18/25 1930  ceFEPIme injection 2 g         -- IV Every 8 hours (non-standard times) 06/18/25 1123    06/18/25 1200  vancomycin 1,250 mg in 0.9% NaCl 250 mL IVPB (admixture device)         -- IV Every 12 hours (non-standard times) 06/18/25 1104    06/18/25 1145  levoFLOXacin 750 mg/150 mL IVPB 750 mg         -- IV Every 24 hours (non-standard times) 06/18/25 1034    06/18/25 1130  vancomycin - pharmacy to dose  (vancomycin IVPB (PEDS and ADULTS))        Placed in "And" Linked Group    -- IV pharmacy to manage frequency 06/18/25 1034          Microbiology Results (last 7 days)       Procedure Component Value Units Date/Time    Culture, Respiratory with Gram Stain [[id number]]     Order Status: Sent Specimen: Sputum, Expectorated           Microbiology Results (last 7 days)       Procedure Component Value Units Date/Time    Culture, Respiratory with Gram Stain [[id number]]     Order Status: Sent Specimen: Sputum, Expectorated           - 2/4 SIRS criteria: WBC/RR  - blood cx pending  - respiratory cx pending  - CT chest with Bilateral effusions.Right and left ground-glass opacities, new from prior, concerning for progression of infectious process.     Plan  - Avoid Albuterol  - Start Levalbuterol and Ipratropium   - Monitor closely for need for BiPAP or intubation  - road-spectrum antibiotics: Cefepime, Vancomycin, Levofloxaci  - Labs: CBC, CMP, ABG, lactate, procalcitonin, sputum " cultures    If no improvement or worsening: consider ICU transfer  Maintain tight monitoring: HR, BP, RR, mental status, gas exchange  - Pulm consulted    VTE Risk Mitigation (From admission, onward)           Ordered     apixaban tablet 5 mg  2 times daily         06/18/25 1138                                  Swapna Birmingham MD  Department of Hospital Medicine  [facility]

## 2025-06-18 NOTE — SUBJECTIVE & OBJECTIVE
Past Medical History:   Diagnosis Date    Benign neoplasm of colon 10/01/2013    Bronchitis chronic     CAD (coronary artery disease) 10/31/2013    Cataract 2008    COPD (chronic obstructive pulmonary disease)     Emphysema of lung     Encounter for preventive health examination 03/21/2018    GERD (gastroesophageal reflux disease)     Glaucoma 2010    Hearing loss 2015    Heart attack 2013    Hx of colonic polyps     Hypertension     NAFLD (nonalcoholic fatty liver disease) 03/27/2017    SHOLA on CPAP     RLS (restless legs syndrome)     Screen for colon cancer 08/30/2013       Past Surgical History:   Procedure Laterality Date    bilateral foot surgery  1993    CARDIAC CATHETERIZATION  2013    x1    CATARACT EXTRACTION, BILATERAL      COLON SURGERY  2013    Ace Mott MD    COLONOSCOPY N/A 03/21/2018    Procedure: COLONOSCOPY;  Surgeon: Terry Mott MD;  [facility]  Service: Endoscopy;  Laterality: N/A;    COLONOSCOPY N/A 04/12/2019    Procedure: COLONOSCOPY;  Surgeon: John Mantilla MD;  [facility] ([facility]  Service: Endoscopy;  Laterality: N/A;    COLONOSCOPY N/A 05/31/2022    Procedure: COLONOSCOPY;  Surgeon: MEDINA Farris MD;  Location: [facility] ([facility]  Service: Endoscopy;  Laterality: N/A;  fully vacc-inst portal-tb    ENDOBRONCHIAL ULTRASOUND Bilateral 04/30/2024    Procedure: ENDOBRONCHIAL ULTRASOUND (EBUS);  Surgeon: Naveed Aguero MD;  Location: [facility];  Service: Pulmonary;  Laterality: Bilateral;    EYE SURGERY  2011    scrotal abscess removal      TYMPANOSTOMY TUBE PLACEMENT  2017       Review of patient's allergies indicates:   Allergen Reactions    Brilinta [ticagrelor] Itching    Lisinopril      Other reaction(s): cough    Metoprolol succinate Rash       Family History       Problem Relation (Age of Onset)    Diabetes Maternal Grandmother    Heart attack Mother    Heart disease Mother    Hypertension Mother    No Known Problems Sister, Daughter           Tobacco Use    Smoking status: Former     Current packs/day: 0.00     Average packs/day: 1 pack/day for 40.0 years (40.0 ttl pk-yrs)     Types: Cigarettes     Start date: 8/15/1972     Quit date: 8/15/2012     Years since quittin.8     Passive exposure: Never    Smokeless tobacco: Never   Substance and Sexual Activity    Alcohol use: Yes     Alcohol/week: 3.0 standard drinks of alcohol     Types: 3 Cans of beer per week     Comment: maybe 2-3  beers weekly    Drug use: No    Sexual activity: Yes     Partners: Female      Review of Systems   Constitutional:  Positive for activity change and fatigue. Negative for fever.   HENT: Negative.     Respiratory:  Positive for chest tightness and shortness of breath. Negative for apnea, cough, wheezing and stridor.    Cardiovascular:  Positive for chest pain and palpitations. Negative for leg swelling.   Gastrointestinal: Negative.    Skin: Negative.    Psychiatric/Behavioral: Negative.       Objective:     Vital Signs (Most Recent):  Temp: 98.3 °F (36.8 °C) (25 1115)  Pulse: 103 (25 1345)  Resp: (!) 24 (25 1345)  BP: (!) 96/51 (25 1345)  SpO2: 97 % (25 1345) Vital Signs (24h Range):  Temp:  [97.2 °F (36.2 °C)-98.3 °F (36.8 °C)] 98.3 °F (36.8 °C)  Pulse:  [] 103  Resp:  [16-42] 24  SpO2:  [85 %-100 %] 97 %  BP: ()/(51-77) 96/51   Weight: 90.7 kg (200 lb)  Body mass index is 29.53 kg/m².      Intake/Output Summary (Last 24 hours) at 2025 1358  Last data filed at 2025 0902  Gross per 24 hour   Intake 1000 ml   Output 1850 ml   Net -850 ml          Physical Exam  Vitals reviewed.   Constitutional:       General: He is in acute distress.      Appearance: He is ill-appearing. He is not toxic-appearing.   HENT:      Head: Normocephalic and atraumatic.   Eyes:      General: No scleral icterus.        Right eye: No discharge.         Left eye: No discharge.      Extraocular Movements: Extraocular movements intact.       Conjunctiva/sclera: Conjunctivae normal.      Pupils: Pupils are equal, round, and reactive to light.   Cardiovascular:      Rate and Rhythm: Tachycardia present. Rhythm irregular.      Pulses: Normal pulses.      Heart sounds: Normal heart sounds. No murmur heard.  Pulmonary:      Effort: Respiratory distress present.      Breath sounds: No stridor. Rhonchi present. No wheezing or rales.      Comments: On BiPAP  Chest:      Chest wall: No tenderness.   Abdominal:      General: Abdomen is flat. There is no distension.      Palpations: There is no mass.      Tenderness: There is no abdominal tenderness.      Hernia: No hernia is present.   Musculoskeletal:      Right lower leg: No edema.      Left lower leg: No edema.   Skin:     General: Skin is warm and dry.      Coloration: Skin is not jaundiced or pale.      Findings: No erythema or rash.   Neurological:      Mental Status: He is alert and oriented to person, place, and time.   Psychiatric:         Mood and Affect: Mood normal.         Behavior: Behavior normal.            Vents:  Oxygen Concentration (%): 40 (06/18/25 1215)  Lines/Drains/Airways       Peripheral Intravenous Line  Duration                  Midline Catheter - Single Lumen 06/14/25 1245 Left basilic vein (medial side of arm) other (see comments) 4 days         Peripheral IV - Single Lumen 06/16/25 1033 22 G 1 1/4 in Anterior;Right Wrist 2 days         Peripheral IV - Single Lumen 06/18/25 0755 18 G Right Antecubital <1 day                  Significant Labs:    CBC/Anemia Profile:  Recent Labs   Lab 06/18/25  0514 06/18/25  0754 06/18/25  0759 06/18/25  0948   WBC 10.74 17.04*  --   --    HGB 11.7* 11.7*  --   --    HCT 36.1* 35.6* 40.2  --    PLT 98* 240  --   --    MCV 87 88  --   --    RDW 16.7* 17.0*  --   --    IRON  --   --   --  21*   FERRITIN  --   --   --  2,096.0*        Chemistries:  Recent Labs   Lab 06/18/25  0514 06/18/25  0754    138   K 3.5 3.3*    100   CO2 22* 27   BUN  18 19   CREATININE 0.7 0.7   CALCIUM 8.5* 9.0   ALBUMIN 1.8* 2.0*   PROT 5.1* 5.8*   BILITOT 0.6 0.8   ALKPHOS 103 120   ALT 51* 52*   AST 25 20   MG 1.9 1.9   PHOS 2.4* 1.8*       All pertinent labs within the past 24 hours have been reviewed.    Significant Imaging:   Imaging Results              CT Chest Without Contrast (Final result)  Result time 06/18/25 11:16:35      Final result by Geovanny Cooley MD (06/18/25 11:16:35)                   Impression:      Interval development of bilateral small pleural effusions.    Right and left ground-glass opacities, new from prior, concerning for progression of infectious process.    Additional findings as above.    Electronically signed by resident: Madeline Iniguez  Date:    06/18/2025  Time:    10:07    Electronically signed by: Geovanny Dennis  Date:    06/18/2025  Time:    11:16               Narrative:    EXAMINATION:  CT CHEST WITHOUT CONTRAST    CLINICAL HISTORY:  Pneumonia, unresolved;    TECHNIQUE:  Using low dose technique the chest was surveyed from above the pulmonary apices through the posterior costophrenic angles.  Data was reconstructed for multiplanar images in axial, sagittal and coronal planes and for maximal intensity projection images in the the axial, sagittal and coronal planes.    COMPARISON:  Multiple priors, most recent chest x-ray 06/18/2025 and CTA 06/10/2025    FINDINGS:  Support tubes and lines: None.    Aorta: Mild ectasia of the ascending aorta measuring 4.1 cm, similar to prior.  Mild atherosclerosis.  Aberrant right subclavian artery.    Heart: Normal size.    Coronary arteries: Multi-vessel calcific atherosclerosis of the coronary arteries.    Pericardium: Trace pericardial effusion.    Central pulmonary arteries: Normal caliber.    Base of neck/thyroid: Unremarkable.    Lymph nodes: No supraclavicular, axillary, internal mammary, mediastinal, or hilar adenopathy.    Esophagus: Unremarkable.    Pleura: Bilateral small  pleural effusions with adjacent compressive atelectasis, new from prior.    Upper abdomen: Fatty atrophy of the pancreas, unchanged.  Accessory splenules.    Body wall: Unremarkable.    Airways: Unremarkable.    Lungs: Paraseptal and centrilobular emphysema.  Right and left ground-glass opacities, mostly affecting the lower lobes, new from prior.  Treatment changes of the left hilar region with associated volume loss and architectural distortion.  Stable 4 mm nodule in the left upper lobe.    Bones: Degenerative changes of the spine.  Unchanged sclerotic focus in the posterior elements of T9.  Height loss of the superior endplate of L1, unchanged.                                       X-Ray Chest AP Portable (Final result)  Result time 06/18/25 08:11:28      Final result by Solitario Rivas MD (06/18/25 08:11:28)                   Impression:      See above      Electronically signed by: Solitario Rivas  Date:    06/18/2025  Time:    08:11               Narrative:    EXAMINATION:  XR CHEST AP PORTABLE    CLINICAL HISTORY:  Chest Pain;    TECHNIQUE:  Single frontal view of the chest was performed.    COMPARISON:  2025    FINDINGS:  Similar perihilar and lower lobe infiltrates within both lungs.  Findings suggestive of pneumonia versus pulmonary edema depending upon clinical scenario.  Stable appearance of the cardiomediastinal contours.  Probable small bilateral pleural effusions.

## 2025-06-18 NOTE — CARE UPDATE
Patient rested well on home CPAP with no respiratory distress noted. Patient asked to be taken off of home CPAP at 0440 and placed back on 10L HFNC. Patient started complaining of SOB. Patient oxygen level is 99%-100%. -120. Will continue to be monitored.

## 2025-06-18 NOTE — ASSESSMENT & PLAN NOTE
Patient with mix of irregular and regular SVT concerning for MAT vs afib w rvr cannot be certain based on ECG and telemetry. This has occurred in the setting of acute on chronic hypoxic respiratory failure in the setting of pneumonia vs COPD exacerbation. He has a recent normal TTE and an allergy to metoprolol. Has a history of documented suspected Aflutter back on June 9-11 which he converted to sinus rhythm with PACs after IV diltiazem and started on PO Dilt at home.      Recommendations:  - continue telemetry  - daily ECG as needed   - K > 4, Mg > 2  - Received IV Dilt in the ED   - recommend AC given CHADSVASc of 4 , continue home eliquis   - Consider starting Amiodarone if patient rate remains uncontrolled since he became hypotensive with Dilt

## 2025-06-18 NOTE — ASSESSMENT & PLAN NOTE
79 year old male with a PMHx COPD on 4L baseline, SHOLA, HTN, CAD, left lung adenocarcinoma with brain mets s/p chemo/RT/immuno (12/24), GERD, HLD presented to [facility] with worsening shortness of breath. In the ED, he was AF. 170s bpm. Labs were notable for leukocytosis 17.04 with left shift. Hgb 11.7. K 3.3. Phosphorus 1.8. BNP and Trop wnl. Lactate normal. VBG 7.471/38.7/32.9/27.7. He was started on 15L non breather and transitioned to BiPAP and weaned to HFNC 60L but BiPAP was restarted with concern for increased WOB despite VBG showing some improvement and O2 sats in the 90s. HR had minimal response to IV Diltiazem. CXR with similar perihilar and lower lobe infiltrates within both lungs. CT chest w/o contrast with interval development of bilateral small pleural effusion and R and L ground glass opacities concerning for progression of infectious processes.    Patient with Hypoxic Respiratory failure which is Acute on chronic.  he is on home oxygen at 4 LPM. Supplemental oxygen was provided and noted- Oxygen Concentration (%):  [40-60] 40    .   Signs/symptoms of respiratory failure include- tachypnea, increased work of breathing, respiratory distress, and use of accessory muscles. Contributing possible diagnoses includes - COPD, Pleural effusion, and severe Pneumonia Labs and images were reviewed. Patient Has recent ABG, which has been reviewed. Will treat underlying causes and adjust management of respiratory failure as follows-     - F/u Bcx pending  - Resp cx pending   - BNP 62, Lac of 1.2    - CTA chest with ill-defined airspace opacities in the right upper and middle lobes when compared to previous on 5/22/25, which could indicate atelectasis, aspiration, Pna.   - Continue levalbuterol and home inhaler   - Continue abx with vanc, cefepime, and Levoquin and de-escalate as clinically appropriate   - Continue Bipap and continue to monitor respiratory status and repeat abg as needed.

## 2025-06-18 NOTE — CARE UPDATE
06/17/25 2015   Patient Assessment/Suction   Level of Consciousness (AVPU) alert   Expansion/Accessory Muscles/Retractions no retractions;no use of accessory muscles   All Lung Fields Breath Sounds coarse;diminished   Rhythm/Pattern, Respiratory unlabored   Skin Integrity   $ Wound Care Tech Time 15 min   Area Observed Left;Right;Behind ear   Skin Appearance redness nonblanchable   PRE-TX-O2   Device (Oxygen Therapy) high flow nasal cannula   $ Is the patient on High Flow Oxygen? Yes   SpO2 (!) 90 %   Pulse Oximetry Type Continuous   $ Pulse Oximetry - Multiple Charge Pulse Oximetry - Multiple   Probe Placed On (Pulse Ox) Left:;finger   Oximetry Probe Status Assessed;Intact   Pulse 97   Resp (!) 30   Positioning HOB elevated 30 degrees   Aerosol Therapy   $ Aerosol Therapy Charges Aerosol Treatment   Daily Review of Necessity (SVN) completed   Respiratory Treatment Status (SVN) given   Treatment Route (SVN) air;mask;oxygen   Patient Position HOB elevated   Post Treatment Assessment (SVN) breath sounds unchanged;breath sounds improved   Signs of Intolerance (SVN) none   Incentive Spirometer   $ Incentive Spirometer Charges done with encouragement   Incentive Spirometer Predicted Level (mL) 1500   Administration (IS) mouthpiece utilized   Number of Repetitions (IS) 10   Level Incentive Spirometer (mL) 1000   Patient Tolerance (IS) good   Vibratory PEP Therapy   $ Vibratory PEP Charges Acapella Therapy   $ Vibratory PEP Equipment Blue Acapella - equipment   $ Vibratory PEP Tech Time Charges 15 min   Daily Review of Necessity (PEP Therapy) completed   Type (PEP Therapy) vibratory/oscillatory   Device (PEP Therapy) Acapella low (blue)   Route (PEP Therapy) mouthpiece   Breaths per Cycle (PEP Therapy) 10   Cycles (PEP Therapy) 1   Settings (PEP Therapy) PEP 5   Patient Position (PEP Therapy) HOB elevated   Post Treatment Assessment (PEP) increased aeration   Signs of Intolerance (PEP Therapy) none   General Safety  Checklist   Safety Promotion/Fall Prevention side rails raised   Airway Safety   Is Ambu Bag and Mask with Patient? Yes, Adult Ambu Bag and Mask   O2  at bedside? No   Suction set is at the bedside? Yes   Communication board is with the patient? Yes   Respiratory Interventions   Cough And Deep Breathing done independently per patient   Airway/Ventilation Management airway patency maintained   Airway/Ventilation Support comfort measures provided   Breathing Techniques/Airway Clearance deep/controlled cough encouraged   Preset CPAP/BiPAP Settings   Mode Of Delivery CPAP;home unit;standby   CPAP/BIPAP charged w/in last 24 h NO   Patient CPAP/BiPAP Settings   CPAP/BIPAP ID home unit   Education   $ Education Bronchodilator;15 min;Other (see comment);PEP Therapy   Transcutaneous Monitor   $ Care Plan Tech Time 15 min   Respiratory Evaluation   $ Care Plan Tech Time 15 min

## 2025-06-18 NOTE — ASSESSMENT & PLAN NOTE
Patient's blood pressure range in the last 24 hours was: BP  Min: 90/63  Max: 130/63.The patient's inpatient anti-hypertensive regimen is listed below:  Current Antihypertensives  diltiaZEM (CARDIZEM) 125 mg in 0.9% NaCl 125 mL infusion, Continuous, Intravenous    Plan  - BP is controlled, no changes needed to their regimen

## 2025-06-18 NOTE — NURSING
Pt HR sustained in 130s-150s. MD notified. EKG obtained and showed Sinus tachycardia with PVCS. Pt is on cardizem drip, which increased on day shift to 10 mg/hr per order. Pt appears to be asymptomatic. Administered 500 ml bolus per MD order. Pt tolerated intervention well; heart rate decreased to 110s. Bed in lowest position and wheels locked, call light within reach, side rails raised x2. Will continue to monitor.

## 2025-06-18 NOTE — ASSESSMENT & PLAN NOTE
IV NSCLC (cT3 cN1 M1b) adenocarcinoma  diagnosed on EBUS 4/30/24 s/p definitive chemo-RT to 60 Gy in 30 fx completed 6/18/24 with Dr. Deng. Staging MRI Brain 4/3/24 demonstrated a 0.8 cm Left thalamic metastasis; this was observed. MRI Brain 7/26/24 demonstrated interval increase in size to 2.2 cm; no other evidence of intracranial disease. He completed GK SRT 27 Gy in 3 fx on 8/7/24. MRI Brain 10/7/24 demonstrated new 1.9 cm Right medial temporo-occipital metastasis. He completed GK SRS 20 Gy x1 to Rt temporal metastasis 10/11/24. MRI Brain 1/24/25 demonstrated a 0.4 cm posterior Right temporal lobe met. This was treated 22 Gy x1 on 1/31/25.      MRI Brain 3/28/25 with improved edema in Right temporal lobe and stable treated enhancing lesion in the Right temporal lobe. No evidence of new/recurrent intracranial disease.   - recently on dexamethasone taper, but stopped taking on 05/12  - following with OP neuro onc

## 2025-06-18 NOTE — SUBJECTIVE & OBJECTIVE
Past Medical History:   Diagnosis Date    Benign neoplasm of colon 10/01/2013    Bronchitis chronic     CAD (coronary artery disease) 10/31/2013    Cataract 2008    COPD (chronic obstructive pulmonary disease)     Emphysema of lung     Encounter for preventive health examination 03/21/2018    GERD (gastroesophageal reflux disease)     Glaucoma 2010    Hearing loss 2015    Heart attack 2013    Hx of colonic polyps     Hypertension     NAFLD (nonalcoholic fatty liver disease) 03/27/2017    SHOLA on CPAP     RLS (restless legs syndrome)     Screen for colon cancer 08/30/2013       Past Surgical History:   Procedure Laterality Date    bilateral foot surgery  1993    CARDIAC CATHETERIZATION  2013    x1    CATARACT EXTRACTION, BILATERAL      COLON SURGERY  2013    Ace Mott MD    COLONOSCOPY N/A 03/21/2018    Procedure: COLONOSCOPY;  Surgeon: Terry Mott MD;  [facility]  Service: Endoscopy;  Laterality: N/A;    COLONOSCOPY N/A 04/12/2019    Procedure: COLONOSCOPY;  Surgeon: John Mantilla MD;  [facility]  Service: Endoscopy;  Laterality: N/A;    COLONOSCOPY N/A 05/31/2022    Procedure: COLONOSCOPY;  Surgeon: MEDINA Farris MD;  [facility]  Service: Endoscopy;  Laterality: N/A;  fully vacc-inst portal-tb    ENDOBRONCHIAL ULTRASOUND Bilateral 04/30/2024    Procedure: ENDOBRONCHIAL ULTRASOUND (EBUS);  Surgeon: Naveed Aguero MD;  Location: [facility];  Service: Pulmonary;  Laterality: Bilateral;    EYE SURGERY  2011    scrotal abscess removal      TYMPANOSTOMY TUBE PLACEMENT  2017       Review of patient's allergies indicates:   Allergen Reactions    Brilinta [ticagrelor] Itching    Lisinopril      Other reaction(s): cough    Metoprolol succinate Rash       Family History       Problem Relation (Age of Onset)    Diabetes Maternal Grandmother    Heart attack Mother    Heart disease Mother    Hypertension Mother    No Known Problems Sister, Daughter           Tobacco Use    Smoking status: Former     Current packs/day: 0.00     Average packs/day: 1 pack/day for 40.0 years (40.0 ttl pk-yrs)     Types: Cigarettes     Start date: 8/15/1972     Quit date: 8/15/2012     Years since quittin.8     Passive exposure: Never    Smokeless tobacco: Never   Substance and Sexual Activity    Alcohol use: Yes     Alcohol/week: 3.0 standard drinks of alcohol     Types: 3 Cans of beer per week     Comment: maybe 2-3  beers weekly    Drug use: No    Sexual activity: Yes     Partners: Female      Review of Systems   Constitutional:  Negative for chills and fever.   Respiratory:  Positive for shortness of breath.    Cardiovascular:  Negative for chest pain and leg swelling.   Gastrointestinal:  Negative for diarrhea, nausea and vomiting.   Genitourinary:  Negative for dysuria.   Neurological:  Negative for syncope.     Objective:     Interval History: HR in the 150's this am, w BP in the low 100's/60's patient was started on amio gtt and HR went down to the 90's and remain HDS.  patient was transition off Bipap this am to NC.     Vital Signs (Most Recent):  Temp: 97.2 °F (36.2 °C) (25 1400)  Pulse: 74 (25 1400)  Resp: (!) 25 (25 1400)  BP: (!) 100/59 (25 1400)  SpO2: 95 % (25 1400) Vital Signs (24h Range):  Temp:  [97.2 °F (36.2 °C)-98.3 °F (36.8 °C)] 97.2 °F (36.2 °C)  Pulse:  [] 74  Resp:  [16-42] 25  SpO2:  [85 %-100 %] 95 %  BP: ()/(51-77) 100/59   Weight: 90.7 kg (200 lb)  Body mass index is 29.53 kg/m².      Intake/Output Summary (Last 24 hours) at 2025 1439  Last data filed at 2025 0902  Gross per 24 hour   Intake 1000 ml   Output 1850 ml   Net -850 ml          Physical Exam  Vitals and nursing note reviewed.   Constitutional:       General: He is not in acute distress.     Appearance: He is ill-appearing. He is not toxic-appearing.   HENT:      Head: Normocephalic and atraumatic.   Eyes:      General: No scleral icterus.         Right eye: No discharge.         Left eye: No discharge.      Extraocular Movements: Extraocular movements intact.      Conjunctiva/sclera: Conjunctivae normal.      Pupils: Pupils are equal, round, and reactive to light.   Cardiovascular:      Rate and Rhythm: Tachycardia present. Rhythm irregular.      Pulses: Normal pulses.      Heart sounds: Normal heart sounds. No murmur heard.  Pulmonary:      Effort: Respiratory distress present.      Breath sounds: No stridor. Rhonchi present. No wheezing or rales.      Comments: On BiPAP  Chest:      Chest wall: No tenderness.   Abdominal:      General: Abdomen is flat. There is no distension.      Palpations: Abdomen is soft. There is no mass.      Tenderness: There is no abdominal tenderness.      Hernia: No hernia is present.   Musculoskeletal:      Right lower leg: No edema.      Left lower leg: No edema.   Skin:     General: Skin is warm.      Coloration: Skin is not jaundiced or pale.      Findings: No erythema or rash.   Neurological:      Mental Status: He is alert and oriented to person, place, and time.   Psychiatric:         Mood and Affect: Mood normal.         Behavior: Behavior normal.            Vents:  Oxygen Concentration (%): 50 (06/18/25 1400)  Lines/Drains/Airways       Peripheral Intravenous Line  Duration                  Midline Catheter - Single Lumen 06/14/25 1245 Left basilic vein (medial side of arm) other (see comments) 4 days         Peripheral IV - Single Lumen 06/16/25 1033 22 G 1 1/4 in Anterior;Right Wrist 2 days         Peripheral IV - Single Lumen 06/18/25 0755 18 G Right Antecubital <1 day                  Significant Labs:    CBC/Anemia Profile:  Recent Labs   Lab 06/18/25  0514 06/18/25  0754 06/18/25  0759 06/18/25  0948   WBC 10.74 17.04*  --   --    HGB 11.7* 11.7*  --   --    HCT 36.1* 35.6* 40.2  --    PLT 98* 240  --   --    MCV 87 88  --   --    RDW 16.7* 17.0*  --   --    IRON  --   --   --  21*   FERRITIN  --   --   --   2,096.0*        Chemistries:  Recent Labs   Lab 06/18/25  0514 06/18/25  0754    138   K 3.5 3.3*    100   CO2 22* 27   BUN 18 19   CREATININE 0.7 0.7   CALCIUM 8.5* 9.0   ALBUMIN 1.8* 2.0*   PROT 5.1* 5.8*   BILITOT 0.6 0.8   ALKPHOS 103 120   ALT 51* 52*   AST 25 20   MG 1.9 1.9   PHOS 2.4* 1.8*           Significant Imaging: I have reviewed all pertinent imaging results/findings within the past 24 hours.

## 2025-06-18 NOTE — ASSESSMENT & PLAN NOTE
Patient's COPD is with exacerbation noted by continued dyspnea, use of accessory muscles for breathing, paradoxical chest wall movement, and worsening of baseline hypoxia currently.  Patient is currently on COPD Pathway. Continue scheduled inhalers Steroids, Antibiotics, and Supplemental oxygen and monitor respiratory status closely.     Plan:   - Patient with severe/end stage emphysema c/b NSCLS c/b radiation necrosis who presents to the ED from LTAC for wrosening hypoxia. To note, this is the third admission in 1 month for repeated exacerbations in which he represents from LTACH.  - Recommend palliative care consult, last note from 9/2024 (which supported an idea of QOL at the time)  when patient was still on therapy for his malignancy van in setting of frequent hospitalizations    - Agree with levaalubterol given afib/aflutter hx   - Restart home LAMA-LABA and ICS while inpatient    - Findings on CT scan and clinical course consistent with possible aspiration Pna. Given coverage, cover with vancomycin (MRSA nares unreliable given prior mupirocin usage), cefepime, and Levaquin for double pseudomonal coverage   - May benefits from chronic azithro to decrease hospitalizations post PNA treatment   - Recommend starting hydrocortisone 50mg q6H   - Reculture sputum

## 2025-06-18 NOTE — CONSULTS
Patient seen and evaluated in the ER. To be admitted to the ICU. Full H&P to follow.     Avel Zaldivar MD  Pulmonary Critical Care Medicine Fellow

## 2025-06-18 NOTE — AI DETERIORATION ALERT
"RAPID RESPONSE NURSE AI ALERT       AI alert received.    Chart Reviewed: 06/18/2025, 12:51 PM    MRN: [id number]  Bed: ED 01/01    Dx: <principal problem not specified>    Jordin Allen Jr. has a past medical history of Benign neoplasm of colon, Bronchitis chronic, CAD (coronary artery disease), Cataract, COPD (chronic obstructive pulmonary disease), Emphysema of lung, Encounter for preventive health examination, GERD (gastroesophageal reflux disease), Glaucoma, Hearing loss, Heart attack, colonic polyps, Hypertension, NAFLD (nonalcoholic fatty liver disease), SHOLA on CPAP, RLS (restless legs syndrome), and Screen for colon cancer.    Last VS: /61   Pulse (!) 140   Temp 98.3 °F (36.8 °C) (Oral)   Resp (!) 24   Ht 5' 9" (1.753 m)   Wt 90.7 kg (200 lb)   SpO2 (!) 91%   BMI 29.53 kg/m²     24H Vital Sign Range:  Temp:  [97.2 °F (36.2 °C)-98.3 °F (36.8 °C)]   Pulse:  []   Resp:  [16-41]   BP: ()/(56-72)   SpO2:  [85 %-100 %]     Level of Consciousness (AVPU): alert    Recent Labs     06/16/25 0429 06/18/25  0514 06/18/25  0754 06/18/25  0759   WBC 13.54* 10.74 17.04*  --    HGB 10.6* 11.7* 11.7*  --    HCT 33.8* 36.1* 35.6* 40.2   * 98* 240  --        Recent Labs     06/16/25 0429 06/18/25  0514 06/18/25  0754    136 138   K 3.8 3.5 3.3*    103 100   CO2 27 22* 27   BUN 21 18 19   CREATININE 0.8 0.7 0.7   * 106 129*   PHOS 2.4* 2.4* 1.8*   MG 1.8 1.9 1.9        Recent Labs     06/18/25  0759 06/18/25  1200   PH 7.542* 7.407   PCO2 33.2* 49.3*   PO2 29.3* 30.8*   HCO3 29.2 28.5*        OXYGEN:  Flow (L/min) (Oxygen Therapy): 60  Oxygen Concentration (%): 40       MEWS score: 3    AI alert received. Pt now tachycardiac w/HR in the 170s. RT at bedside placing pt back on BiPAP. MICU at bedside to assess pt. TRISTA Farrar aware. No additional concerns verbalized at this time. Instructed to call [phone] for further concerns or assistance.    Thu Coyne, " RN

## 2025-06-18 NOTE — CARE UPDATE
Unit [facility] Care Support Interaction      I have reviewed the chart of Jordin NELA Allen Jr. who is hospitalized for COPD with acute exacerbation. The patient is currently located in the following unit: ED    Clinical support - wound care consult placed.      Napoleon Decker PA-C  Unit Based [facility]

## 2025-06-18 NOTE — HPI
"Jordin Angel"Tiffany Horn is a 79 year old male with a PMHx COPD on 4L baseline, SHOLA, HTN, CAD, left lung adenocarcinoma with brain mets s/p chemo/RT/immuno (12/24), GERD, HLD presented to [facility] from [facility] with worsening shortness of breath since recent discharge from [facility]. He was admitted to Hospital Medicine for acute hypoxic respiratory failure however shortly after he was admitted to MICU for increasing work of breathing required BiPAP and A fib w/ RVR not responsive to IV Diltiazem.    He was recently admitted to [facility] for similar presentation and was treated for parainfluenza pneumonia and COPD exacerbation. On route, he became hypotensive and was electrically cardioverted, with only transient slowing of rate. Notable for multiple admissions (3) in the last month for similar presentations.     In the ED, he was AF. 170s bpm. Labs were notable for leukocytosis 17.04 with left shift. Hgb 11.7. K 3.3. Phosphorus 1.8. BNP and Trop wnl. Lactate normal. VBG 7.471/38.7/32.9/27.7. He was started on 15L non breather and transitioned to BiPAP and weaned to HFNC 60L but BiPAP was restarted with concern for increased WOB despite VBG showing some improvement and O2 sats in the 90s. HR had minimal response to IV Diltiazem. CXR with similar perihilar and lower lobe infiltrates within both lungs. CT chest w/o contrast with interval development of bilateral small pleural effusion and R and L ground glass opacities concerning for progression of infectious processes. Pulmonology was consulted for recurrent COPD. He was started on Cefepime, Vancomycin, Levaquin and IV Solumedrol for PNA and COPD exacerbation.     Admitted to MICU for acute respiratory failure and Afib w/RVR.     History obtained through chart review and patient.      "

## 2025-06-18 NOTE — ACP (ADVANCE CARE PLANNING)
Dr. Zaldivar and I held discussion with the patient regarding his goals of care. The patient states that he would want to be intubated for mechanical ventilation if he were to decompensate but in the event of a cardiac arrest, he would not want CPR and would want to be allowed to die peacefully.    Goals of care discussions ongoing per primary team.    I spent 30 minutes with this patient discussing goals of care.    Sheree Argueta, [facility]  Pulmonary Critical Care  [id number]

## 2025-06-18 NOTE — SUBJECTIVE & OBJECTIVE
Past Medical History:   Diagnosis Date    Benign neoplasm of colon 10/01/2013    Bronchitis chronic     CAD (coronary artery disease) 10/31/2013    Cataract 2008    COPD (chronic obstructive pulmonary disease)     Emphysema of lung     Encounter for preventive health examination 03/21/2018    GERD (gastroesophageal reflux disease)     Glaucoma 2010    Hearing loss 2015    Heart attack 2013    Hx of colonic polyps     Hypertension     NAFLD (nonalcoholic fatty liver disease) 03/27/2017    SHOLA on CPAP     RLS (restless legs syndrome)     Screen for colon cancer 08/30/2013       Past Surgical History:   Procedure Laterality Date    bilateral foot surgery  1993    CARDIAC CATHETERIZATION  2013    x1    CATARACT EXTRACTION, BILATERAL      COLON SURGERY  2013    Ace Mott MD    COLONOSCOPY N/A 03/21/2018    Procedure: COLONOSCOPY;  Surgeon: Terry Mott MD;  Location: [facility] ([facility]  Service: Endoscopy;  Laterality: N/A;    COLONOSCOPY N/A 04/12/2019    Procedure: COLONOSCOPY;  Surgeon: John Mantilla MD;  Location: [facility] ([facility]  Service: Endoscopy;  Laterality: N/A;    COLONOSCOPY N/A 05/31/2022    Procedure: COLONOSCOPY;  Surgeon: MEDINA Farris MD;  Location: [facility] ([facility]  Service: Endoscopy;  Laterality: N/A;  fully vacc-inst portal-tb    ENDOBRONCHIAL ULTRASOUND Bilateral 04/30/2024    Procedure: ENDOBRONCHIAL ULTRASOUND (EBUS);  Surgeon: Naveed Aguero MD;  Location: [facility] OR;  Service: Pulmonary;  Laterality: Bilateral;    EYE SURGERY  2011    scrotal abscess removal      TYMPANOSTOMY TUBE PLACEMENT  2017       Review of patient's allergies indicates:   Allergen Reactions    Brilinta [ticagrelor] Itching    Lisinopril      Other reaction(s): cough    Metoprolol succinate Rash       Family History       Problem Relation (Age of Onset)    Diabetes Maternal Grandmother    Heart attack Mother    Heart disease Mother    Hypertension Mother    No Known Problems Sister, Daughter           Tobacco Use    Smoking status: Former     Current packs/day: 0.00     Average packs/day: 1 pack/day for 40.0 years (40.0 ttl pk-yrs)     Types: Cigarettes     Start date: 8/15/1972     Quit date: 8/15/2012     Years since quittin.8     Passive exposure: Never    Smokeless tobacco: Never   Substance and Sexual Activity    Alcohol use: Yes     Alcohol/week: 3.0 standard drinks of alcohol     Types: 3 Cans of beer per week     Comment: maybe 2-3  beers weekly    Drug use: No    Sexual activity: Yes     Partners: Female         Review of Systems   Constitutional:  Negative for fatigue.   Respiratory:  Positive for shortness of breath and wheezing.    Gastrointestinal:  Negative for abdominal distention and abdominal pain.   Objective:     Vital Signs (Most Recent):  Temp: 98.2 °F (36.8 °C) (25 0757)  Pulse: 91 (25 09)  Resp: 20 (25 09)  BP: 113/60 (25 0931)  SpO2: 97 % (25) Vital Signs (24h Range):  Temp:  [97.2 °F (36.2 °C)-98.2 °F (36.8 °C)] 98.2 °F (36.8 °C)  Pulse:  [] 91  Resp:  [16-41] 20  SpO2:  [85 %-100 %] 97 %  BP: ()/(56-72) 113/60     Weight: 90.7 kg (200 lb)  Body mass index is 29.53 kg/m².      Intake/Output Summary (Last 24 hours) at 2025 1034  Last data filed at 2025 0902  Gross per 24 hour   Intake 1000 ml   Output 1850 ml   Net -850 ml        Physical Exam  Constitutional:       General: He is in acute distress.      Appearance: Normal appearance. He is ill-appearing. He is not diaphoretic.   HENT:      Head: Normocephalic and atraumatic.   Cardiovascular:      Rate and Rhythm: Normal rate and regular rhythm.      Heart sounds: No murmur heard.  Pulmonary:      Effort: Tachypnea, accessory muscle usage, prolonged expiration and respiratory distress present. No bradypnea.      Breath sounds: No wheezing.   Abdominal:      General: There is no distension.      Tenderness: There is no abdominal tenderness.   Skin:     General: Skin is  warm and dry.   Neurological:      General: No focal deficit present.      Mental Status: He is alert and oriented to person, place, and time.   Psychiatric:         Mood and Affect: Mood normal.         Behavior: Behavior normal.      Vents:  Oxygen Concentration (%): 60 (06/18/25 0914)    Lines/Drains/Airways       Peripheral Intravenous Line  Duration                  Midline Catheter - Single Lumen 06/14/25 1245 Left basilic vein (medial side of arm) other (see comments) 3 days         Peripheral IV - Single Lumen 06/16/25 1033 22 G 1 1/4 in Anterior;Right Wrist 2 days         Peripheral IV - Single Lumen 06/18/25 0755 18 G Right Antecubital <1 day                    Significant Labs:    CBC/Anemia Profile:  Recent Labs   Lab 06/18/25  0514 06/18/25  0754 06/18/25  0759   WBC 10.74 17.04*  --    HGB 11.7* 11.7*  --    HCT 36.1* 35.6* 40.2   PLT 98* 240  --    MCV 87 88  --    RDW 16.7* 17.0*  --         Chemistries:  Recent Labs   Lab 06/18/25  0514 06/18/25  0754    138   K 3.5 3.3*    100   CO2 22* 27   BUN 18 19   CREATININE 0.7 0.7   CALCIUM 8.5* 9.0   ALBUMIN 1.8* 2.0*   PROT 5.1* 5.8*   BILITOT 0.6 0.8   ALKPHOS 103 120   ALT 51* 52*   AST 25 20   MG 1.9 1.9   PHOS 2.4* 1.8*       All pertinent labs within the past 24 hours have been reviewed.    Significant Imaging:   I have reviewed all pertinent imaging results/findings within the past 24 hours.

## 2025-06-18 NOTE — ED NOTES
Denies known Latex allergy or symptoms of Latex sensitivity.  Medications verified, no changes.  Medication allergies verified  PCP Verified  Pharmacy Verified  Tobacco Verified  Patient is being seen  for foot injury. DOI 5/225/2021 at 11:30 am at school. Patient is only able to put a little weight on foot. Patient dropped 45 pound weight and landed mainly on big toe Left foot. Patient has been using crutches, icing and elevating foot. Patient has bruising and swelling. Patient took Ibuprofen for the pain yesterday.            Per MD verbal order pt. given post op shoe , instructed on the use and care of this DME; instructed if Insurance does not cover this DME pt./responsible party is responsible for the cost of the item, Pt. /responsible party verbalized understanding of instruction today     Hospital med team 4 notified of pt increased SOB, and low pulse ox readings.

## 2025-06-18 NOTE — ED TRIAGE NOTES
Pt arrives from [facility] extended care with chest pain starting this morning. EMS states the pt was in RVR, tachy, and hypotensive and cardioverted in the field. Just started on dilt last night in the extended care.

## 2025-06-18 NOTE — H&P
"[facility] - Emergency Dept  Critical Care Medicine  History & Physical    Patient Name: Jordin Allen Jr.  MRN: [id number]  Admission Date: 6/18/2025  Hospital Length of Stay: 0 days  Code Status: DNR  Attending Physician: Theo Lucia MD   Primary Care Provider: Sierra Landry MD   Principal Problem: Acute on chronic respiratory failure with hypoxia    Subjective:     HPI:  Joridn Allen Jr (Ray) is a 79 year old male with a PMHx COPD on 4L baseline, SHOLA, HTN, CAD, left lung adenocarcinoma with brain mets s/p chemo/RT/immuno (12/24), GERD, HLD presented to [facility] from LTAC with worsening shortness of breath since recent discharge from [facility]. He was admitted to Hospital Medicine for acute hypoxic respiratory failure however shortly after he was admitted to MICU for increasing work of breathing required BiPAP and A fib w/ RVR not responsive to IV Diltiazem.    He was recently admitted to [facility] for similar presentation and was treated for parainfluenza pneumonia and COPD exacerbation. On route, he became hypotensive and was electrically cardioverted, with only transient slowing of rate. Notable for multiple admissions (3) in the last month for similar presentations.     In the ED, he was AF. 170s bpm. Labs were notable for leukocytosis 17.04 with left shift. Hgb 11.7. K 3.3. Phosphorus 1.8. BNP and Trop wnl. Lactate normal. VBG 7.471/38.7/32.9/27.7. He was started on 15L non breather and transitioned to BiPAP and weaned to HFNC 60L but BiPAP was restarted with concern for increased WOB despite VBG showing some improvement and O2 sats in the 90s. HR had minimal response to IV Diltiazem. CXR with similar perihilar and lower lobe infiltrates within both lungs. CT chest w/o contrast with interval development of bilateral small pleural effusion and R and L ground glass opacities concerning for progression of infectious processes. Pulmonology was consulted for recurrent COPD. He was started on Cefepime, Vancomycin, " Levaquin and IV Solumedrol for PNA and COPD exacerbation.     Admitted to MICU for acute respiratory failure and Afib w/RVR.     History obtained through chart review and patient.        Hospital/ICU Course:  No notes on file     Past Medical History:   Diagnosis Date    Benign neoplasm of colon 10/01/2013    Bronchitis chronic     CAD (coronary artery disease) 10/31/2013    Cataract 2008    COPD (chronic obstructive pulmonary disease)     Emphysema of lung     Encounter for preventive health examination 03/21/2018    GERD (gastroesophageal reflux disease)     Glaucoma 2010    Hearing loss 2015    Heart attack 2013    Hx of colonic polyps     Hypertension     NAFLD (nonalcoholic fatty liver disease) 03/27/2017    SHOLA on CPAP     RLS (restless legs syndrome)     Screen for colon cancer 08/30/2013       Past Surgical History:   Procedure Laterality Date    bilateral foot surgery  1993    CARDIAC CATHETERIZATION  2013    x1    CATARACT EXTRACTION, BILATERAL      COLON SURGERY  2013    Ace Mott MD    COLONOSCOPY N/A 03/21/2018    Procedure: COLONOSCOPY;  Surgeon: Terry Mott MD;  [facility]  Service: Endoscopy;  Laterality: N/A;    COLONOSCOPY N/A 04/12/2019    Procedure: COLONOSCOPY;  Surgeon: John Mantilla MD;  [facility]  Service: Endoscopy;  Laterality: N/A;    COLONOSCOPY N/A 05/31/2022    Procedure: COLONOSCOPY;  Surgeon: MEDINA Farris MD;  Location[facility]  Service: Endoscopy;  Laterality: N/A;  fully vacc-inst portal-tb    ENDOBRONCHIAL ULTRASOUND Bilateral 04/30/2024    Procedure: ENDOBRONCHIAL ULTRASOUND (EBUS);  Surgeon: Naveed Aguero MD;  Location: [facility];  Service: Pulmonary;  Laterality: Bilateral;    EYE SURGERY  2011    scrotal abscess removal      TYMPANOSTOMY TUBE PLACEMENT  2017       Review of patient's allergies indicates:   Allergen Reactions    Brilinta [ticagrelor] Itching    Lisinopril      Other reaction(s): cough     Metoprolol succinate Rash       Family History       Problem Relation (Age of Onset)    Diabetes Maternal Grandmother    Heart attack Mother    Heart disease Mother    Hypertension Mother    No Known Problems Sister, Daughter          Tobacco Use    Smoking status: Former     Current packs/day: 0.00     Average packs/day: 1 pack/day for 40.0 years (40.0 ttl pk-yrs)     Types: Cigarettes     Start date: 8/15/1972     Quit date: 8/15/2012     Years since quittin.8     Passive exposure: Never    Smokeless tobacco: Never   Substance and Sexual Activity    Alcohol use: Yes     Alcohol/week: 3.0 standard drinks of alcohol     Types: 3 Cans of beer per week     Comment: maybe 2-3  beers weekly    Drug use: No    Sexual activity: Yes     Partners: Female      Review of Systems   Constitutional:  Positive for activity change and fatigue. Negative for fever.   HENT: Negative.     Respiratory:  Positive for chest tightness and shortness of breath. Negative for apnea, cough, wheezing and stridor.    Cardiovascular:  Positive for chest pain and palpitations. Negative for leg swelling.   Gastrointestinal: Negative.    Skin: Negative.    Psychiatric/Behavioral: Negative.       Objective:     Vital Signs (Most Recent):  Temp: 98.3 °F (36.8 °C) (25 1115)  Pulse: 103 (25 1345)  Resp: (!) 24 (25 1345)  BP: (!) 96/51 (25 1345)  SpO2: 97 % (25 1345) Vital Signs (24h Range):  Temp:  [97.2 °F (36.2 °C)-98.3 °F (36.8 °C)] 98.3 °F (36.8 °C)  Pulse:  [] 103  Resp:  [16-42] 24  SpO2:  [85 %-100 %] 97 %  BP: ()/(51-77) 96/51   Weight: 90.7 kg (200 lb)  Body mass index is 29.53 kg/m².      Intake/Output Summary (Last 24 hours) at 2025 1358  Last data filed at 2025 0902  Gross per 24 hour   Intake 1000 ml   Output 1850 ml   Net -850 ml          Physical Exam  Vitals reviewed.   Constitutional:       General: He is in acute distress.      Appearance: He is ill-appearing. He is not  toxic-appearing.   HENT:      Head: Normocephalic and atraumatic.   Eyes:      General: No scleral icterus.        Right eye: No discharge.         Left eye: No discharge.      Extraocular Movements: Extraocular movements intact.      Conjunctiva/sclera: Conjunctivae normal.      Pupils: Pupils are equal, round, and reactive to light.   Cardiovascular:      Rate and Rhythm: Tachycardia present. Rhythm irregular.      Pulses: Normal pulses.      Heart sounds: Normal heart sounds. No murmur heard.  Pulmonary:      Effort: Respiratory distress present.      Breath sounds: No stridor. Rhonchi present. No wheezing or rales.      Comments: On BiPAP  Chest:      Chest wall: No tenderness.   Abdominal:      General: Abdomen is flat. There is no distension.      Palpations: There is no mass.      Tenderness: There is no abdominal tenderness.      Hernia: No hernia is present.   Musculoskeletal:      Right lower leg: No edema.      Left lower leg: No edema.   Skin:     General: Skin is warm and dry.      Coloration: Skin is not jaundiced or pale.      Findings: No erythema or rash.   Neurological:      Mental Status: He is alert and oriented to person, place, and time.   Psychiatric:         Mood and Affect: Mood normal.         Behavior: Behavior normal.            Vents:  Oxygen Concentration (%): 40 (06/18/25 1215)  Lines/Drains/Airways       Peripheral Intravenous Line  Duration                  Midline Catheter - Single Lumen 06/14/25 1245 Left basilic vein (medial side of arm) other (see comments) 4 days         Peripheral IV - Single Lumen 06/16/25 1033 22 G 1 1/4 in Anterior;Right Wrist 2 days         Peripheral IV - Single Lumen 06/18/25 0755 18 G Right Antecubital <1 day                  Significant Labs:    CBC/Anemia Profile:  Recent Labs   Lab 06/18/25  0514 06/18/25  0754 06/18/25  0759 06/18/25  0948   WBC 10.74 17.04*  --   --    HGB 11.7* 11.7*  --   --    HCT 36.1* 35.6* 40.2  --    PLT 98* 240  --   --     MCV 87 88  --   --    RDW 16.7* 17.0*  --   --    IRON  --   --   --  21*   FERRITIN  --   --   --  2,096.0*        Chemistries:  Recent Labs   Lab 06/18/25  0514 06/18/25  0754    138   K 3.5 3.3*    100   CO2 22* 27   BUN 18 19   CREATININE 0.7 0.7   CALCIUM 8.5* 9.0   ALBUMIN 1.8* 2.0*   PROT 5.1* 5.8*   BILITOT 0.6 0.8   ALKPHOS 103 120   ALT 51* 52*   AST 25 20   MG 1.9 1.9   PHOS 2.4* 1.8*       All pertinent labs within the past 24 hours have been reviewed.    Significant Imaging:   Imaging Results              CT Chest Without Contrast (Final result)  Result time 06/18/25 11:16:35      Final result by Geovanny Cooley MD (06/18/25 11:16:35)                   Impression:      Interval development of bilateral small pleural effusions.    Right and left ground-glass opacities, new from prior, concerning for progression of infectious process.    Additional findings as above.    Electronically signed by resident: Madeline Iniguez  Date:    06/18/2025  Time:    10:07    Electronically signed by: Geovanny Dennis  Date:    06/18/2025  Time:    11:16               Narrative:    EXAMINATION:  CT CHEST WITHOUT CONTRAST    CLINICAL HISTORY:  Pneumonia, unresolved;    TECHNIQUE:  Using low dose technique the chest was surveyed from above the pulmonary apices through the posterior costophrenic angles.  Data was reconstructed for multiplanar images in axial, sagittal and coronal planes and for maximal intensity projection images in the the axial, sagittal and coronal planes.    COMPARISON:  Multiple priors, most recent chest x-ray 06/18/2025 and CTA 06/10/2025    FINDINGS:  Support tubes and lines: None.    Aorta: Mild ectasia of the ascending aorta measuring 4.1 cm, similar to prior.  Mild atherosclerosis.  Aberrant right subclavian artery.    Heart: Normal size.    Coronary arteries: Multi-vessel calcific atherosclerosis of the coronary arteries.    Pericardium: Trace pericardial  effusion.    Central pulmonary arteries: Normal caliber.    Base of neck/thyroid: Unremarkable.    Lymph nodes: No supraclavicular, axillary, internal mammary, mediastinal, or hilar adenopathy.    Esophagus: Unremarkable.    Pleura: Bilateral small pleural effusions with adjacent compressive atelectasis, new from prior.    Upper abdomen: Fatty atrophy of the pancreas, unchanged.  Accessory splenules.    Body wall: Unremarkable.    Airways: Unremarkable.    Lungs: Paraseptal and centrilobular emphysema.  Right and left ground-glass opacities, mostly affecting the lower lobes, new from prior.  Treatment changes of the left hilar region with associated volume loss and architectural distortion.  Stable 4 mm nodule in the left upper lobe.    Bones: Degenerative changes of the spine.  Unchanged sclerotic focus in the posterior elements of T9.  Height loss of the superior endplate of L1, unchanged.                                       X-Ray Chest AP Portable (Final result)  Result time 06/18/25 08:11:28      Final result by Solitario Rivas MD (06/18/25 08:11:28)                   Impression:      See above      Electronically signed by: Solitario Rivas  Date:    06/18/2025  Time:    08:11               Narrative:    EXAMINATION:  XR CHEST AP PORTABLE    CLINICAL HISTORY:  Chest Pain;    TECHNIQUE:  Single frontal view of the chest was performed.    COMPARISON:  2025    FINDINGS:  Similar perihilar and lower lobe infiltrates within both lungs.  Findings suggestive of pneumonia versus pulmonary edema depending upon clinical scenario.  Stable appearance of the cardiomediastinal contours.  Probable small bilateral pleural effusions.                                     Assessment/Plan:     Psychiatric  Anxiety  - was started on Precedex for a short period while in the ED due to anxiety     Pulmonary  * Acute on chronic respiratory failure with hypoxia  79 year old male with a PMHx COPD on 4L baseline, SHOLA, HTN, CAD, left lung  adenocarcinoma with brain mets s/p chemo/RT/immuno (12/24), GERD, HLD [place] [facility] [place] with worsening shortness of breath. In the ED, he was AF. 170s bpm. Labs were notable for leukocytosis 17.04 with left shift. Hgb 11.7. K 3.3. Phosphorus 1.8. BNP and Trop wnl. Lactate normal. VBG 7.471/38.7/32.9/27.7. He was started on 15L non breather and transitioned to BiPAP and weaned to HFNC 60L but BiPAP was restarted with concern for increased WOB despite VBG showing some improvement and O2 sats in the 90s. HR had minimal response to IV Diltiazem. CXR with similar perihilar and lower lobe infiltrates within both lungs. CT chest w/o contrast with interval development of bilateral small pleural effusion and R and L ground glass opacities concerning for progression of infectious processes.    Patient with Hypoxic Respiratory failure which is Acute on chronic.  he is on home oxygen at 4 LPM. Supplemental oxygen was provided and noted- Oxygen Concentration (%):  [40-60] 40    .   Signs/symptoms of respiratory failure include- tachypnea, increased work of breathing, respiratory distress, and use of accessory muscles. Contributing possible diagnoses includes - COPD, Pleural effusion, and severe Pneumonia Labs and images were reviewed. Patient Has recent ABG, which has been reviewed. Will treat underlying causes and adjust management of respiratory failure as follows-     - F/u Bcx pending  - Resp cx pending   - BNP 62, Lac of 1.2    - CTA chest with ill-defined airspace opacities in the right upper and middle lobes when compared to previous on 5/22/25, which could indicate atelectasis, aspiration, Pna.   - Continue levalbuterol and home inhaler   - Continue abx with vanc, cefepime, and Levoquin and de-escalate as clinically appropriate   - Continue Bipap and continue to monitor respiratory status and repeat abg as needed.     COPD with acute exacerbation    Patient with a history of COPD On Symbicort Spiriva and  Nino at home.  Follows up with pulmonology outpatient.   Presents with continued dyspnea, use of accessory muscles for breathing, and worsening of baseline hypoxia currently.  Patient is currently on COPD Pathway. Continue scheduled inhalers Steroids, Antibiotics, and Supplemental oxygen and monitor respiratory status closely.         Cardiac/Vascular  Atrial fibrillation with RVR  Patient with mix of irregular and regular SVT concerning for MAT vs afib w rvr cannot be certain based on ECG and telemetry. This has occurred in the setting of acute on chronic hypoxic respiratory failure in the setting of pneumonia vs COPD exacerbation. He has a recent normal TTE and an allergy to metoprolol. Has a history of documented suspected Aflutter back on June 9-11 which he converted to sinus rhythm with PACs after IV diltiazem and started on PO Dilt at home.      Recommendations:  - continue telemetry  - daily ECG as needed   - K > 4, Mg > 2  - Received IV Dilt in the ED   - recommend AC given CHADSVASc of 4 , continue home eliquis   - Consider starting Amiodarone if patient rate remains uncontrolled since he became hypotensive with Dilt       Dyslipidemia  Continue Atorvastatin     CAD (coronary artery disease)  Patient with known history of  CAD s/p stent placement and CABG, which is controlled Will continue ASA and Statin and monitor for S/Sx of angina/ACS. Continue to monitor on telemetry.     Oncology  Metastasis to brain   IV NSCLC (cT3 cN1 M1b) adenocarcinoma  diagnosed on EBUS 4/30/24 s/p definitive chemo-RT to 60 Gy in 30 fx completed 6/18/24 with Dr. Deng. Staging MRI Brain 4/3/24 demonstrated a 0.8 cm Left thalamic metastasis; this was observed. MRI Brain 7/26/24 demonstrated interval increase in size to 2.2 cm; no other evidence of intracranial disease. He completed GK SRT 27 Gy in 3 fx on 8/7/24. MRI Brain 10/7/24 demonstrated new 1.9 cm Right medial temporo-occipital metastasis. He completed GK SRS 20 Gy  x1 to Rt temporal metastasis 10/11/24. MRI Brain 1/24/25 demonstrated a 0.4 cm posterior Right temporal lobe met. This was treated 22 Gy x1 on 1/31/25.      MRI Brain 3/28/25 with improved edema in Right temporal lobe and stable treated enhancing lesion in the Right temporal lobe. No evidence of new/recurrent intracranial disease.   - recently on dexamethasone taper, but stopped taking on 05/12  - following with OP neuro onc    Adenocarcinoma, lung, left  Stage IV NSCLC (cT3 cN1 M1b) adenocarcinoma Completed treatment with chemo/XRT. Brain XRT for lesionsx2. off immunotherapy since 12/24. Follows with Dr. Ponce outpatient       GI  GERD (gastroesophageal reflux disease)  - Continue PPI           Critical Care Daily Checklist:    A: Awake: RASS Goal/Actual Goal:    Actual:     B: Spontaneous Breathing Trial Performed?     C: SAT & SBT Coordinated?  N/A                      D: Delirium: CAM-ICU     E: Early Mobility Performed? Yes   F: Feeding Goal:    Status:     Current Diet Order   Procedures    Diet NPO      AS: Analgesia/Sedation Precedex   T: Thromboembolic Prophylaxis Eliquis    H: HOB > 300 Yes   U: Stress Ulcer Prophylaxis (if needed) PPI   G: Glucose Control Within range    B: Bowel Function  Miralax, Senna    I: Indwelling Catheter (Lines & Hernandez) Necessity Midline, PIVx2   D: De-escalation of Antimicrobials/Pharmacotherapies N/A    Plan for the day/ETD Monitor respiratory status, HR     Code Status:  Family/Goals of Care: DNR         Critical secondary to Patient has a condition that poses threat to life and bodily function: Severe Respiratory Distress and Afib RVR     Critical care was time spent personally by me on the following activities: development of treatment plan with patient or surrogate and bedside caregivers, discussions with consultants, evaluation of patient's response to treatment, examination of patient, ordering and performing treatments and interventions, ordering and review of  laboratory studies, ordering and review of radiographic studies, pulse oximetry, re-evaluation of patient's condition. This critical care time did not overlap with that of any other provider or involve time for any procedures.     Rogelio Ko MD  Critical Care Medicine  [facility] - Emergency Dept

## 2025-06-18 NOTE — ASSESSMENT & PLAN NOTE
Patient's COPD is with exacerbation noted by continued dyspnea, use of accessory muscles for breathing, and worsening of baseline hypoxia currently.  Patient is currently on COPD Pathway. Continue scheduled inhalers Steroids, Antibiotics, and Supplemental oxygen and monitor respiratory status closely.   Recent steroid taper completed--possible new exacerbation      Plan:  - Re-initiate systemic steroids; Hydrocort 50 mg Q6H  - Stop Albuterol and   - Start Levalbutrerol with Ipratropium  - Continue Abx( cefrepime, Vancomycin  - Monitor for worsening hypercapnia, repeat ABG  as indicated

## 2025-06-18 NOTE — NURSING
Patient complaining of chest pain X1 hour. Nitro X3 doses given, no relief. NP notified and given order to send to [facility] [place]. Carine is here at bedside, loading patient onto stretcher.

## 2025-06-18 NOTE — ASSESSMENT & PLAN NOTE
"Likely due to worsening pneumonia +/- COPD exacerbation. HFNC 60?L/40% FiO?, sats 89% -- borderline; watch for signs of fatigue     Current antimicrobial regimen consists of the antibiotics listed below. Will monitor patient closely and continue current treatment plan unchanged.    Antibiotics (From admission, onward)      Start     Stop Route Frequency Ordered    06/18/25 1930  ceFEPIme injection 2 g         -- IV Every 8 hours (non-standard times) 06/18/25 1123    06/18/25 1200  vancomycin 1,250 mg in 0.9% NaCl 250 mL IVPB (admixture device)         -- IV Every 12 hours (non-standard times) 06/18/25 1104    06/18/25 1145  levoFLOXacin 750 mg/150 mL IVPB 750 mg         -- IV Every 24 hours (non-standard times) 06/18/25 1034    06/18/25 1130  vancomycin - pharmacy to dose  (vancomycin IVPB (PEDS and ADULTS))        Placed in "And" Linked Group    -- IV pharmacy to manage frequency 06/18/25 1034            Microbiology Results (last 7 days)       Procedure Component Value Units Date/Time    Culture, Respiratory with Gram Stain [[id number]]     Order Status: Sent Specimen: Sputum, Expectorated              Current antimicrobial regimen consists of the antibiotics listed below. Will monitor patient closely and continue current treatment plan unchanged.    Antibiotics (From admission, onward)      Start     Stop Route Frequency Ordered    06/18/25 1930  ceFEPIme injection 2 g         -- IV Every 8 hours (non-standard times) 06/18/25 1123    06/18/25 1200  vancomycin 1,250 mg in 0.9% NaCl 250 mL IVPB (admixture device)         -- IV Every 12 hours (non-standard times) 06/18/25 1104    06/18/25 1145  levoFLOXacin 750 mg/150 mL IVPB 750 mg         -- IV Every 24 hours (non-standard times) 06/18/25 1034    06/18/25 1130  vancomycin - pharmacy to dose  (vancomycin IVPB (PEDS and ADULTS))        Placed in "And" Linked Group    -- IV pharmacy to manage frequency 06/18/25 1034          Microbiology Results (last 7 days)       " Procedure Component Value Units Date/Time    Culture, Respiratory with Gram Stain [[id number]]     Order Status: Sent Specimen: Sputum, Expectorated           Microbiology Results (last 7 days)       Procedure Component Value Units Date/Time    Culture, Respiratory with Gram Stain [[id number]]     Order Status: Sent Specimen: Sputum, Expectorated           - 2/4 SIRS criteria: WBC/RR  - blood cx pending  - respiratory cx pending  - CT chest with Bilateral effusions.Right and left ground-glass opacities, new from prior, concerning for progression of infectious process.     Plan  - Avoid Albuterol  - Start Levalbuterol and Ipratropium   - Monitor closely for need for BiPAP or intubation  - road-spectrum antibiotics: Cefepime, Vancomycin, Levofloxaci  - Labs: CBC, CMP, ABG, lactate, procalcitonin, sputum cultures    If no improvement or worsening: consider ICU transfer  Maintain tight monitoring: HR, BP, RR, mental status, gas exchange  - Pulm consulted

## 2025-06-18 NOTE — ASSESSMENT & PLAN NOTE
Patient with known history of  CAD s/p stent placement and CABG, which is controlled Will continue ASA and Statin and monitor for S/Sx of angina/ACS. Continue to monitor on telemetry.

## 2025-06-18 NOTE — PLAN OF CARE
Pt remained stable and responded well to all interventions throughout the night. No new complaints or issues noted. Good UO. Pt resting comfortably. Bed in lowest position and wheels locked, call light within reach, side rails raised x2. Will continue to monitor  Problem: Adult Inpatient Plan of Care  Goal: Plan of Care Review  Outcome: Ongoing  Goal: Patient-Specific Goal (Individualized)  Outcome: Ongoing  Goal: Absence of Hospital-Acquired Illness or Injury  Outcome: Ongoing  Goal: Optimal Comfort and Wellbeing  Outcome: Ongoing  Goal: Readiness for Transition of Care  Outcome: Ongoing     Problem: Pneumonia  Goal: Fluid Balance  Outcome: Ongoing  Goal: Resolution of Infection Signs and Symptoms  Outcome: Ongoing  Goal: Effective Oxygenation and Ventilation  Outcome: Ongoing     Problem: Wound  Goal: Optimal Coping  Outcome: Ongoing  Goal: Optimal Functional Ability  Outcome: Ongoing  Goal: Absence of Infection Signs and Symptoms  Outcome: Ongoing  Goal: Improved Oral Intake  Outcome: Ongoing  Goal: Optimal Pain Control and Function  Outcome: Ongoing  Goal: Skin Health and Integrity  Outcome: Ongoing  Goal: Optimal Wound Healing  Outcome: Ongoing     Problem: Skin Injury Risk Increased  Goal: Skin Health and Integrity  Outcome: Ongoing     Problem: Fall Injury Risk  Goal: Absence of Fall and Fall-Related Injury  Outcome: Ongoing     Problem: Breathing Pattern Ineffective  Goal: Effective Breathing Pattern  Outcome: Ongoing     Problem: Airway Clearance Ineffective  Goal: Effective Airway Clearance  Outcome: Ongoing     Problem: Gas Exchange Impaired  Goal: Optimal Gas Exchange  Outcome: Ongoing     Problem: Noninvasive Ventilation Acute  Goal: Effective Unassisted Ventilation and Oxygenation  Outcome: Ongoing     Problem: Infection  Goal: Absence of Infection Signs and Symptoms  Outcome: Ongoing

## 2025-06-18 NOTE — PHARMACY MED REC
"          Admission Medication History     The home medication history was taken by Lucila Borden.    You may go to "Admission" then "Reconcile Home Medications" tabs to review and/or act upon these items.     The home medication list has been updated by the Pharmacy department.   Please read ALL comments highlighted in yellow.   Please address this information as you see fit.    Feel free to contact us if you have any questions or require assistance.          Medications listed below were obtained from: SNF/Rehab/LTAC     amitriptyline tablet 25 mg  Dose: 25 mg  Freq: Nightly Route: Oral    ammonium lactate 12 % lotion  Freq: Daily Route: Top    apixaban tablet 5 mg  Dose: 5 mg  Freq: 2 times daily Route: Oral    aspirin EC tablet 81 mg  Dose: 81 mg  Freq: Every morning Route: Oral    atorvastatin tablet 80 mg  Dose: 80 mg  Freq: Daily Route: Oral    fluticasone propionate 50 mcg/actuation nasal spray 100 mcg  Dose: 2 spray  Freq: Daily Route: Each Nostril    guaiFENesin 12 hr tablet 1,200 mg  Dose: 1,200 mg  Freq: 2 times daily Route: Oral    levalbuterol nebulizer solution 1.2495 mg  Dose: 1.2495 mg  Freq: Every 4 hours Route: NEBULIZATION    levETIRAcetam tablet 500 mg  Dose: 500 mg  Freq: 2 times daily Route: Oral    mirtazapine tablet 15 mg  Dose: 15 mg  Freq: Nightly Route: Oral    pantoprazole EC tablet 40 mg  Dose: 40 mg  Freq: Daily Route: Oral    piperacillin-tazobactam 4.5 g in sodium chloride 0.9% 100 mL IVPB (ready to mix)  Dose: 4.5 g  Freq: Every 8 hours Route: IV  Indications of Use: lower respiratory infection  Last Dose: 4.5 g (06/18/25 0508)  Start: 06/12/25 0945 End: 06/19/25 7968    roflumilast (DALIRESP) tablet 500 mcg  Dose: 500 mcg  Freq: Daily Route: Oral    sodium chloride 3% nebulizer solution 4 mL  Dose: 4 mL  Freq: 2 times daily Route: NEBULIZATION    tiotropium bromide 2.5 mcg/actuation inhaler 2 puff  Dose: 2 puff  Freq: Daily Route: Inhl    vancomycin 1 g in 0.9% sodium chloride " 250 mL IVPB (ready to mix)  Dose: 1,000 mg  Freq: Every 12 hours Route: IV  Indications Comment: Pneumonia  Start: 06/16/25 2100    diltiaZEM (CARDIZEM) 125 mg in 0.9% NaCl 125 mL infusion  Rate: 10 mL/hr Dose: 10 mg/hr  Freq: Continuous Route: IV    PRN    acetaminophen tablet 650 mg  Dose: 650 mg  Freq: Every 8 hours PRN Route: Oral    albuterol nebulizer solution 0.6667 mg  Dose: 0.6667 mg  Freq: Every 6 hours PRN Route: NEBULIZATION    benzonatate capsule 100 mg  Dose: 100 mg  Freq: 3 times daily PRN Route: Oral    calcium carbonate 200 mg calcium (500 mg) chewable tablet 500 mg  Dose: 500 mg  Freq: 3 times daily PRN Route: Oral    hydrOXYzine HCL tablet 25 mg  Dose: 25 mg  Freq: 3 times daily PRN Route: Oral    melatonin tablet 6 mg  Dose: 6 mg  Freq: Nightly PRN Route: Oral    methocarbamoL tablet 500 mg  Dose: 500 mg  Freq: 3 times daily PRN Route: Oral    ondansetron disintegrating tablet 4 mg  Dose: 4 mg  Freq: Every 6 hours PRN Route: Oral    oxyCODONE-acetaminophen 5-325 mg per tablet 1 tablet  Dose: 1 tablet  Freq: Every 4 hours PRN Route: Oral    polyethylene glycol packet 17 g  Dose: 17 g  Freq: 2 times daily PRN Route: Oral    sodium chloride 0.9% flush 10 mL  Dose: 10 mL  Freq: Every 12 hours PRN Route: IV        Potential issues to be addressed PRIOR TO DISCHARGE  The listed medications were obtained from another facility ([facility]). The patient may not have been able to fill these prescriptions prior to this admission and may require new scripts upon discharge.     Lucila Borden  [id number]        .

## 2025-06-18 NOTE — ED NOTES
Verbal order via MD Jacob to place pt back on Bipap. Respiratory at bedside to place pt on Bipap.

## 2025-06-18 NOTE — CONSULTS
Patient seen and evaluated by critical care medicine, to be admitted to the MICU. Full H&P to follow.    Misael Bingham, DO  Critical Care Medicine

## 2025-06-18 NOTE — HPI
79M PMHx COPD(4L), SHOLA, HTN, CAD, left lung ca (brain mets) s/p chemo/RT/immuno (12/24), GERD, HLD presenting from LTAC with chest pain, shortness of breath, and tachycardia. He was discharged 2 weeks ago after hospitalization for hypoxic respiratory failure due to parainfluenza pneumonia and COPD exacerbation, during which he was started on Apixaban and Diltiazem. Since returning to LTAC, he had persistent respiratory symptoms but acutely developed severe (9/10) chest pain, worsening SOB, and rapid heart rate this morning. Despite being on Diltiazem, he remained tachycardic, and EMS was called. En route, he became hypotensive and was electrically cardioverted, with only transient slowing of rate. He is currently on Cefepime, Vancomycin and levaquin for pneumonia.  Of note, he has been admitted to [facility] x3 in the last month for continued SOB and COPD exacerbation. Admitted to  for further management

## 2025-06-18 NOTE — SUBJECTIVE & OBJECTIVE
Past Medical History:   Diagnosis Date    Benign neoplasm of colon 10/01/2013    Bronchitis chronic     CAD (coronary artery disease) 10/31/2013    Cataract 2008    COPD (chronic obstructive pulmonary disease)     Emphysema of lung     Encounter for preventive health examination 03/21/2018    GERD (gastroesophageal reflux disease)     Glaucoma 2010    Hearing loss 2015    Heart attack 2013    Hx of colonic polyps     Hypertension     NAFLD (nonalcoholic fatty liver disease) 03/27/2017    SHOLA on CPAP     RLS (restless legs syndrome)     Screen for colon cancer 08/30/2013       Past Surgical History:   Procedure Laterality Date    bilateral foot surgery  1993    CARDIAC CATHETERIZATION  2013    x1    CATARACT EXTRACTION, BILATERAL      COLON SURGERY  2013    Ace Mott MD    COLONOSCOPY N/A 03/21/2018    Procedure: COLONOSCOPY;  Surgeon: Terry Mott MD;  [facility]  Service: Endoscopy;  Laterality: N/A;    COLONOSCOPY N/A 04/12/2019    Procedure: COLONOSCOPY;  Surgeon: John Mantilla MD;  Location: [facility]  Service: Endoscopy;  Laterality: N/A;    COLONOSCOPY N/A 05/31/2022    Procedure: COLONOSCOPY;  Surgeon: MEDINA aFrris MD;  Location: [facility]  Service: Endoscopy;  Laterality: N/A;  fully vacc-inst portal-tb    ENDOBRONCHIAL ULTRASOUND Bilateral 04/30/2024    Procedure: ENDOBRONCHIAL ULTRASOUND (EBUS);  Surgeon: Naveed Aguero MD;  Location: [facility] OR;  Service: Pulmonary;  Laterality: Bilateral;    EYE SURGERY  2011    scrotal abscess removal      TYMPANOSTOMY TUBE PLACEMENT  2017       Review of patient's allergies indicates:   Allergen Reactions    Brilinta [ticagrelor] Itching    Lisinopril      Other reaction(s): cough    Metoprolol succinate Rash       Current Facility-Administered Medications on File Prior to Encounter   Medication    [ - Suspended Admission] acetaminophen tablet 650 mg    [ - Suspended Admission] albuterol nebulizer solution 0.6667  mg    [ - Suspended Admission] amitriptyline tablet 25 mg    [ - Suspended Admission] ammonium lactate 12 % lotion    [ - Suspended Admission] apixaban tablet 5 mg    [ - Suspended Admission] aspirin EC tablet 81 mg    [ - Suspended Admission] atorvastatin tablet 80 mg    [ - Suspended Admission] benzonatate capsule 100 mg    [ - Suspended Admission] calcium carbonate 200 mg calcium (500 mg) chewable tablet 500 mg    dextrose 50% injection 12.5 g    dextrose 50% injection 25 g    [ - Suspended Admission] diltiaZEM (CARDIZEM) 125 mg in 0.9% NaCl 125 mL infusion    [ - Suspended Admission] fluticasone propionate 50 mcg/actuation nasal spray 100 mcg    [COMPLETED] furosemide injection 20 mg    [ - Suspended Admission] guaiFENesin 12 hr tablet 1,200 mg    [ - Suspended Admission] hydrOXYzine HCL tablet 25 mg    insulin regular injection 0-8 Units    [ - Suspended Admission] levalbuterol nebulizer solution 1.2495 mg    [ - Suspended Admission] levETIRAcetam tablet 500 mg    [ - Suspended Admission] melatonin tablet 6 mg    [ - Suspended Admission] methocarbamoL tablet 500 mg    [ - Suspended Admission] mirtazapine tablet 15 mg    [ - Suspended Admission] naloxone 0.4 mg/mL injection 0.02 mg    [ - Suspended Admission] nitroGLYCERIN SL tablet 0.4 mg    [ - Suspended Admission] ondansetron disintegrating tablet 4 mg    [ - Suspended Admission] oxyCODONE-acetaminophen 5-325 mg per tablet 1 tablet    [ - Suspended Admission] pantoprazole EC tablet 40 mg    [ - Suspended Admission] piperacillin-tazobactam 4.5 g in sodium chloride 0.9% 100 mL IVPB (ready to mix)    [ - Suspended Admission] polyethylene glycol packet 17 g    [ - Suspended Admission] roflumilast (DALIRESP) tablet 500 mcg    [COMPLETED] sodium chloride 0.9% bolus 500 mL 500 mL    [ - Suspended Admission] sodium chloride 0.9% flush 10 mL    [ - Suspended Admission] sodium chloride 3% nebulizer solution 4 mL    [ - Suspended Admission] tiotropium bromide 2.5  mcg/actuation inhaler 2 puff    [ - Suspended Admission] vancomycin - pharmacy to dose    [ - Suspended Admission] vancomycin 1 g in 0.9% sodium chloride 250 mL IVPB (ready to mix)    [DISCONTINUED] diltiaZEM (CARDIZEM) 125 mg in 0.9% NaCl 125 mL infusion     Current Outpatient Medications on File Prior to Encounter   Medication Sig    amitriptyline (ELAVIL) 25 MG tablet Take 1 tablet (25 mg total) by mouth once daily. (Patient taking differently: Take 25 mg by mouth every evening.)    apixaban (ELIQUIS) 5 mg Tab Take 1 tablet (5 mg total) by mouth 2 (two) times daily.    aspirin (ECOTRIN) 81 MG EC tablet Take 81 mg by mouth every morning.    atorvastatin (LIPITOR) 80 MG tablet Take 1 tablet (80 mg total) by mouth in the morning.    budesonide-glycopyr-formoterol (BREZTRI AEROSPHERE) 160-9-4.8 mcg/actuation HFAA Inhale 2 puffs into the lungs 2 (two) times a day.    fluticasone propionate (FLONASE) 50 mcg/actuation nasal spray Spray 2 sprays (100 mcg total) in Each Nostril once daily.    ipratropium (ATROVENT) 0.02 % nebulizer solution Take 2.5 mLs (500 mcg total) by nebulization every 6 (six) hours as needed for Wheezing. Rescue    levETIRAcetam (KEPPRA) 500 MG Tab Take 1 tablet (500 mg total) by mouth 2 (two) times daily.    losartan (COZAAR) 100 MG tablet Take 1 tablet (100 mg total) by mouth once daily.    omeprazole (PRILOSEC) 20 MG capsule Take 20 mg by mouth once daily.    roflumilast (DALIRESP) 500 mcg Tab Take 500 mcg by mouth every morning.    traZODone (DESYREL) 50 MG tablet Take 1 tablet (50 mg total) by mouth every evening.    0.9% NaCl SolP 250 mL with vancomycin 1,000 mg SolR 1,000 mg Inject 1,000 mg into the vein every 12 (twelve) hours.    acetaminophen (TYLENOL) 500 MG tablet Take 500 mg by mouth 2 (two) times daily as needed for Pain.    benzonatate (TESSALON) 100 MG capsule Take 1 capsule (100 mg total) by mouth 3 (three) times daily as needed for Cough.    BETA-CAROTENE,A, W-C & E/MIN (OCUVITE  ORAL) Take 1 capsule by mouth once daily.     D5W PgBk 100 mL with piperacillin-tazobactam 4.5 gram SolR 4.5 g Inject 4.5 g into the vein every 8 (eight) hours. for 7 days    dextrose 50% injection Inject 25 mLs (12.5 g total) into the vein as needed (between 50 - 70 mg/dL if patient cannnot take PO but has IV access.).    dextrose 50% injection Inject 50 mLs (25 g total) into the vein as needed (less than 50 mg/dL if patient cannnot take PO but has IV access.).    diltiaZEM (CARDIZEM CD) 180 MG 24 hr capsule Take 1 capsule (180 mg total) by mouth once daily.    echinacea 400 mg Cap Take 1 capsule by mouth once daily.     glucose 4 GM chewable tablet Take 4 tablets (16 g total) by mouth as needed.    glucose 4 GM chewable tablet Take 6 tablets (24 g total) by mouth as needed.    guaiFENesin (MUCINEX) 600 mg 12 hr tablet Take 1 tablet (600 mg total) by mouth 2 (two) times daily. for 10 days    latanoprost 0.005 % ophthalmic solution Instill 1 drop into both eyes every night at bedtime    polyethylene glycol (GLYCOLAX) 17 gram/dose powder Take 17 grams by mouth every day for constipation prevention     Family History       Problem Relation (Age of Onset)    Diabetes Maternal Grandmother    Heart attack Mother    Heart disease Mother    Hypertension Mother    No Known Problems Sister, Daughter          Tobacco Use    Smoking status: Former     Current packs/day: 0.00     Average packs/day: 1 pack/day for 40.0 years (40.0 ttl pk-yrs)     Types: Cigarettes     Start date: 8/15/1972     Quit date: 8/15/2012     Years since quittin.8     Passive exposure: Never    Smokeless tobacco: Never   Substance and Sexual Activity    Alcohol use: Yes     Alcohol/week: 3.0 standard drinks of alcohol     Types: 3 Cans of beer per week     Comment: maybe 2-3  beers weekly    Drug use: No    Sexual activity: Yes     Partners: Female     Review of Systems   Constitutional:  Negative for activity change, chills and fever.   HENT:   "Negative for trouble swallowing.    Eyes:  Negative for photophobia and visual disturbance.   Respiratory:  Positive for cough, shortness of breath and wheezing. Negative for chest tightness.    Cardiovascular:  Positive for chest pain. Negative for palpitations and leg swelling.   Gastrointestinal:  Negative for abdominal pain, constipation, diarrhea, nausea and vomiting.   Genitourinary:  Negative for dysuria, frequency, hematuria and urgency.   Musculoskeletal:  Negative for arthralgias, back pain and gait problem.   Skin:  Negative for color change and rash.   Neurological:  Negative for dizziness, syncope, weakness, light-headedness, numbness and headaches.   Psychiatric/Behavioral:  Negative for agitation and confusion. The patient is not nervous/anxious.      Objective:     Vital Signs (Most Recent):  Temp: 98.3 °F (36.8 °C) (06/18/25 1115)  Pulse: 101 (06/18/25 1137)  Resp: (!) 26 (06/18/25 1137)  BP: (!) 107/58 (06/18/25 1115)  SpO2: (!) 94 % (06/18/25 1137) Vital Signs (24h Range):  Temp:  [97.2 °F (36.2 °C)-98.3 °F (36.8 °C)] 98.3 °F (36.8 °C)  Pulse:  [] 101  Resp:  [16-41] 26  SpO2:  [85 %-100 %] 94 %  BP: ()/(56-72) 107/58     Weight: 90.7 kg (200 lb)  Body mass index is 29.53 kg/m².     Physical Exam           Significant Labs: All pertinent labs within the past 24 hours have been reviewed.  ABGs:   Recent Labs   Lab 06/18/25  0759 06/18/25  1200   PH 7.542* 7.407   PCO2 33.2* 49.3*   HCO3 29.2 28.5*   PO2 29.3* 30.8*     Blood Culture: No results for input(s): "LABBLOO" in the last 48 hours.  CBC:   Recent Labs   Lab 06/18/25  0514 06/18/25  0754 06/18/25  0759   WBC 10.74 17.04*  --    HGB 11.7* 11.7*  --    HCT 36.1* 35.6* 40.2   PLT 98* 240  --      CMP:   Recent Labs   Lab 06/18/25  0514 06/18/25  0754    138   K 3.5 3.3*    100   CO2 22* 27    129*   BUN 18 19   CREATININE 0.7 0.7   CALCIUM 8.5* 9.0   PROT 5.1* 5.8*   ALBUMIN 1.8* 2.0*   BILITOT 0.6 0.8   ALKPHOS " 103 120   AST 25 20   ALT 51* 52*   ANIONGAP 11 11     Lactic Acid:   Recent Labs   Lab 06/18/25  1155   LACTATE 1.2       Significant Imaging: I have reviewed all pertinent imaging results/findings within the past 24 hours.

## 2025-06-18 NOTE — HPI
Mr. Allen is a 79 year old male w/ hx of COPD, left lung adenocarcinoma s/p chemoradiation c/b radiation necrosis and pneumonitis, off immunotherapy since 12/24 with metastasis to brain s/p XRT x2 lesions, CAD, SHOLA on CPAP, HTN, TIA, hypertension, chronic venous stasis who presented to [facility] ED with worsening SOB and increasing oxygen requirements at LTAC. To note, patient has presented/been admitted to the hospital 3x in less than 1 month for worsening SOB and COPD exacerbation. First on 5/22, again on 6/9, and now currently 6/18. He is treated with abx, steroids, and inhalers including duonebs, along with HFNC, which correlates to the LTAC oxygen weaning. To know resp culutres have been negative but RIP positive for parainfluenza last admission.

## 2025-06-18 NOTE — PROGRESS NOTES
[facility]  Wound Care Progress Note  Attending Physician: Bernardino Guthrie MD  Primary Care Physician: Sierra Landry MD  Date of Admit: 6/3/2025      Chief Complaint    Wound to left leg   History of Present Illness:     Per attending   HPI: Jordin Allen Jr. 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 Patient has not felt this chest pain since the episode. Currently no chest pain. Does endorse SOB and cough. Reports he is not coughing up anything because he feels like it is stuck in his chest. Denies fever, chills, chest pain, palpitations,  abdominal pain, n/v/d, dysuria, headaches, or any other symptoms at this time.      In ED: AF, VSS on 6-8L HFNC. CBC with leukocytosis. Hgb 11.9. CMP notable for Na 135, mild elevation in ALT. Phos 2.4. BNP 62. HS trop 12. LA 0.9. covid/flu negative. ABG with pH 7.499, pCO2 56.3, pO2 79, HCO3 43.8. Blood cultures pending. CTA chest with no evidence of acute PE, mild ill-defined airspace opacities in the dependent portions of the right upper and middle lobes when compared to 05/22/2025, nonspecific.  This could be due to atelectasis, aspiration and/or pneumonia 5 mm left upper lobe nodule, unchanged from 05/22/2025 but new when compared to 03/05/2025.  A follow-up chest CT in 1 year could be considered if the patient is at increased risk of developing lung cancer, such as from a smoking history. S/p duoneb, dexamethasone 10mg inj, vanc and zosyn in ED. Admitted to  for further evaluation.     6/4/2025 Patient seen for wound to left leg  Patient seen lying in bed. No acute distress. Wound care done with nurse. No issues or complaints at  time. Cont POC Plan to f/u on 6/5 6/6/2025 Patient seen lying in bed. No acute distress. Wound care done with nurse. No issues or complaints at time. Cont POC Plan to f/u on 6/9 6/12/2025 Patient seen lying in bed. Patient returned from acute stay. No acute distress. Wound care done with nurse. No issues or complaints at time. Cont POC Plan to f/u on 6/13 6/13/2925 Patient seen lying in bed. No acute distress. Wound care done with nurse. No issues or complaints at time. Cont POC Plan to f/u on  6/16 6/16/2025 Patient seen lying in bed. No acute distress. Wound care done with nurse. No issues or complaints at time. Cont POC Plan to f/u on  6/17 6/17/2025 Patient seen lying in bed. No acute distress. Wound care done with nurse. No issues or complaints at time. Cont POC Plan to f/u on       Past medical history:     Past Medical History:   Diagnosis Date    Benign neoplasm of colon 10/01/2013    Bronchitis chronic     CAD (coronary artery disease) 10/31/2013    Cataract 2008    COPD (chronic obstructive pulmonary disease)     Emphysema of lung     Encounter for preventive health examination 03/21/2018    GERD (gastroesophageal reflux disease)     Glaucoma 2010    Hearing loss 2015    Heart attack 2013    Hx of colonic polyps     Hypertension     NAFLD (nonalcoholic fatty liver disease) 03/27/2017    SHOLA on CPAP     RLS (restless legs syndrome)     Screen for colon cancer 08/30/2013     Past medical history was reviewed and was otherwise negative except as above.    Past surgical history:     Past Surgical History:   Procedure Laterality Date    bilateral foot surgery  1993    CARDIAC CATHETERIZATION  2013    x1    CATARACT EXTRACTION, BILATERAL      COLON SURGERY  2013    Ace Mott MD    COLONOSCOPY N/A 03/21/2018    Procedure: COLONOSCOPY;  Surgeon: Terry Mott MD;  Location: [facility] ([address] [place] [address]);  Service: Endoscopy;  Laterality: N/A;    COLONOSCOPY N/A 04/12/2019    Procedure: COLONOSCOPY;   Surgeon: John Mantilla MD;  Location: [facility] (4TH FLR);  Service: Endoscopy;  Laterality: N/A;    COLONOSCOPY N/A 05/31/2022    Procedure: COLONOSCOPY;  Surgeon: MEDINA Farris MD;  Location: [facility] ENDO (4TH FLR);  Service: Endoscopy;  Laterality: N/A;  fully vacc-inst portal-tb    ENDOBRONCHIAL ULTRASOUND Bilateral 04/30/2024    Procedure: ENDOBRONCHIAL ULTRASOUND (EBUS);  Surgeon: Naveed Aguero MD;  Location: [facility] OR;  Service: Pulmonary;  Laterality: Bilateral;    EYE SURGERY  2011    scrotal abscess removal      TYMPANOSTOMY TUBE PLACEMENT  2017     Past surgical history was reviewed and was noncontributory except as above.    Allergies:     Review of patient's allergies indicates:   Allergen Reactions    Brilinta [ticagrelor] Itching    Lisinopril      Other reaction(s): cough    Metoprolol succinate Rash     Allergies were reviewed and were negative except as above.    Home Medications:     Prior to Admission medications    Medication Sig Start Date End Date Taking? Authorizing Provider   acetaminophen (TYLENOL) 500 MG tablet Take 500 mg by mouth 2 (two) times daily as needed for Pain.    Provider, Historical   albuterol (ACCUNEB) 0.63 mg/3 mL Nebu Inhale 3 mLs (0.63 mg total) by nebulization every 6 (six) hours as needed (if symptoms failed to improve with inhaler). Rescue 12/10/24   Bienvenido Ward MD   albuterol (PROVENTIL/VENTOLIN HFA) 90 mcg/actuation inhaler Inhale 2 puffs into the lungs every 4 (four) hours as needed for Wheezing. 12/10/24   Bienvenido Ward MD   amitriptyline (ELAVIL) 25 MG tablet Take 1 tablet (25 mg total) by mouth once daily.  Patient taking differently: Take 25 mg by mouth every evening. 9/3/24   Sierra Landry MD   aspirin (ECOTRIN) 81 MG EC tablet Take 81 mg by mouth every morning.    Provider, Historical   atorvastatin (LIPITOR) 80 MG tablet Take 1 tablet (80 mg total) by mouth in the morning. 4/21/25   Sierra Landry MD   BETA-CAROTENE,A, W-C &  E/MIN (OCUVITE ORAL) Take 1 capsule by mouth once daily.     Provider, Historical   budesonide-glycopyr-formoterol (BREZTRI AEROSPHERE) 160-9-4.8 mcg/actuation HFAA Inhale 2 puffs into the lungs 2 (two) times a day. 12/10/24   Bienvenido Ward MD   dexAMETHasone (DECADRON) 4 MG Tab Take 1 tablet (4 mg total) by mouth 2 (two) times daily.  Patient not taking: Reported on 5/16/2025 3/26/25   Bienvenido Henson MD   echinacea 400 mg Cap Take 1 capsule by mouth once daily.     Provider, Historical   fluticasone propionate (FLONASE) 50 mcg/actuation nasal spray Spray 2 sprays (100 mcg total) in Each Nostril once daily. 10/23/24   Caren Shah MD   furosemide (LASIX) 20 MG tablet Take 1 tablet (20 mg total) by mouth once daily. 4/15/25 4/15/26  Bienvenido Ward MD   ibuprofen (ADVIL,MOTRIN) 200 MG tablet Take 200 mg by mouth every 8 (eight) hours as needed for Pain. 2/21/25   Provider, Historical   latanoprost 0.005 % ophthalmic solution Instill 1 drop into both eyes every night at bedtime 1/30/25      levETIRAcetam (KEPPRA) 500 MG Tab Take 1 tablet (500 mg total) by mouth 2 (two) times daily. 3/24/25 3/24/26  Janene Nelson PA-C   losartan (COZAAR) 100 MG tablet Take 1 tablet (100 mg total) by mouth once daily. 12/19/24   Prince Ba MD   nitroGLYCERIN (NITROSTAT) 0.4 MG SL tablet Place 1 tablet (0.4 mg total) under the tongue every 5 (five) minutes as needed. 5/6/24   Prince Ba MD   omeprazole (PRILOSEC) 20 MG capsule Take 20 mg by mouth once daily.    Provider, Historical   polyethylene glycol (GLYCOLAX) 17 gram/dose powder Take 17 grams by mouth every day for constipation prevention 5/20/25      roflumilast (DALIRESP) 500 mcg Tab Take 500 mcg by mouth every morning. 2/22/25   Provider, Historical   sennosides (SENNA ORAL) Take 1 tablet by mouth daily as needed (Constipation).    Provider, Historical   traZODone (DESYREL) 50 MG tablet Take 1 tablet (50 mg total) by mouth every evening.  "25   Lou Muro NP       Family History:     Family History   Problem Relation Name Age of Onset    Heart disease Mother jc deutsch     Heart attack Mother jc deutsch     Hypertension Mother jc deutsch     No Known Problems Sister      No Known Problems Daughter 2     Diabetes Maternal Grandmother imtiaz jennings     Asthma Neg Hx      Emphysema Neg Hx      Prostate cancer Neg Hx      Cirrhosis Neg Hx       Family history was reviewed and was otherwise negative except as above.    Social History:   Social History[1]  Social history was reviewed and was otherwise negative except as above    Review of Systems   A 10 point review of systems was conducted and was negative except as described in the HPI.  Patient denies Chest Pain.  Patient denies shortness of breath.  Patient denies fevers.  Patient denies chills.    Physical Examination:   Triage: BP: 123/77  Pulse: 101  Temp: 97.6 °F (36.4 °C)  Resp: 18  Height: 5' 9" (175.3 cm)  Weight: 86.8 kg (191 lb 5.8 oz) (witness by SALO Escalante and C.CGreg RT.)  BMI (Calculated): 28.2  SpO2: 97 %  Exam: BP (!) 100/59   Pulse (!) 133   Temp 97.4 °F (36.3 °C) (Axillary)   Resp (!) 37   Ht 5' 9" (1.753 m)   Wt 87.4 kg (192 lb 10.9 oz)   SpO2 (!) 85%   BMI 28.45 kg/m²     Vitals  BP  Min: 90/63  Max: 130/63  Temp  Av.5 °F (36.4 °C)  Min: 96.7 °F (35.9 °C)  Max: 98.3 °F (36.8 °C)  Pulse  Av  Min: 69  Max: 164  Resp  Av.2  Min: 16  Max: 42  SpO2  Av.1 %  Min: 85 %  Max: 99 %  Height  Av' 9" (175.3 cm)  Min: 5' 9" (175.3 cm)  Max: 5' 9" (175.3 cm)  Weight  Av.1 kg (196 lb 5.5 oz)  Min: 87.4 kg (192 lb 10.9 oz)  Max: 90.7 kg (200 lb)    General Pt alert and oriented, not in apparent distress, good nutrition  Eyes: No conjunctival erythema; normal appearing lids  HEENT: Normocephalic atraumatic, mucous membranes moist  Cardiovascular: No edema  Respiratory: Non-labored, no accessory muscle use, no wheezing  Abdomen: Soft, non tender, " non distended, no rebound  Musculoskeletal: no clubbing or cyanosis of digits  Neuro: Grossly intact, weakness upper/lower extremities  Psych: Good mood, full affect, good insight  Skin:    Wound 05/19/25 0233 Moisture associated dermatitis Left dorsal Foot   Date First Assessed/Time First Assessed: 05/19/25 0233   Present on Original Admission: Yes  Primary Wound Type: Moisture associated dermatitis  Side: Left  Orientation: dorsal  Location: Foot  Is this injury device related?: No   Wound Image                            Dressing Appearance Dry   Drainage Amount None   Drainage Characteristics/Odor No odor   Appearance Dry   Periwound Area Intact   Care Wound cleanser   Dressing Applied  (Lac-hydrin 12%)        Wound 06/04/25 1030 Moisture associated dermatitis Right lateral Abdomen   Date First Assessed/Time First Assessed: 06/04/25 1030   Present on Original Admission: Yes  Primary Wound Type: Moisture associated dermatitis  Side: Right  Orientation: lateral  Location: Abdomen   Wound Image    Dressing Appearance Open to air   Drainage Amount None   Red (%), Wound Tissue Color 100 %   Periwound Area Moist   Care Wound cleanser   Dressing    (applied Triad paste, open to air)        Wound 06/04/25 1030 Moisture associated dermatitis Right Groin   Date First Assessed/Time First Assessed: 06/04/25 1030   Present on Original Admission: Yes  Primary Wound Type: Moisture associated dermatitis  Side: Right  Location: Groin  Is this injury device related?: No   Wound Image    Dressing Appearance Open to air;No dressing   Drainage Amount None   Red (%), Wound Tissue Color 100 %   Periwound Area Intact   Care Cleansed with:;Wound cleanser   Dressing    (applied Triad paste, open to air)        Wound 06/04/25 1030 Moisture associated dermatitis Left Groin   Date First Assessed/Time First Assessed: 06/04/25 1030   Present on Original Admission: Yes  Primary Wound Type: Moisture associated dermatitis  Side: Left  Location:  Groin   Wound Image    Dressing Appearance Open to air   Drainage Amount None   Appearance Pink   Red (%), Wound Tissue Color 100 %   Periwound Area Moist   Care Cleansed with:;Wound cleanser   Dressing    (applied Triad paste, open to air)        Wound 06/04/25 1030 Moisture associated dermatitis Sacral spine   Date First Assessed/Time First Assessed: 06/04/25 1030   Present on Original Admission: Yes  Primary Wound Type: (c) Moisture associated dermatitis  Location: (c) Sacral spine   Wound Image                            Dressing Appearance Open to air   Drainage Amount None   Drainage Characteristics/Odor No odor   Red (%), Wound Tissue Color 100 %   Periwound Area Moist   Care Wound cleanser   Dressing Applied  (applied Triad paste, open to air)        Wound 06/09/25 Pressure Injury Nose   Date First Assessed: 06/09/25   Present on Original Admission: Yes  Primary Wound Type: Pressure Injury  Location: Nose   Wound Image    Dressing Appearance Open to air;Dry   Drainage Amount None   Appearance Pink;Maroon   Red (%), Wound Tissue Color 100 %   Periwound Area Intact;Dry   Wound Length (cm) 0 cm   Wound Width (cm) 0 cm   Wound Depth (cm) 0 cm   Wound Volume (cm^3) 0 cm^3   Wound Surface Area (cm^2) 0 cm^2   Care Cleansed with:;Wound cleanser   Skin Interventions   Device Skin Pressure Protection pressure points protected;skin-to-device areas padded;tubing/devices free from skin contact   Pressure Reduction Devices pressure-redistributing mattress utilized;specialty bed utilized   Skin Protection hydrocolloids used       Laboratory:  Most Recent Data:  CBC:   Lab Results   Component Value Date    WBC 17.04 (H) 06/18/2025    HGB 11.7 (L) 06/18/2025    HCT 40.2 06/18/2025     06/18/2025    MCV 88 06/18/2025    RDW 17.0 (H) 06/18/2025     BMP:   Lab Results   Component Value Date     06/18/2025    K 3.3 (L) 06/18/2025     06/18/2025    CO2 27 06/18/2025    BUN 19 06/18/2025    CREATININE 0.7  06/18/2025     (H) 06/18/2025    CALCIUM 9.0 06/18/2025    MG 1.9 06/18/2025    PHOS 1.8 (L) 06/18/2025     LFTs:   Lab Results   Component Value Date    PROT 5.8 (L) 06/18/2025    ALBUMIN 2.0 (L) 06/18/2025    BILITOT 0.8 06/18/2025    AST 20 06/18/2025    ALKPHOS 120 06/18/2025    ALT 52 (H) 06/18/2025     Coags:   Lab Results   Component Value Date    INR 1.0 02/07/2025     FLP:   Lab Results   Component Value Date    CHOL 101 (L) 02/07/2025    HDL 35 (L) 02/07/2025    LDLCALC 53.2 (L) 02/07/2025    TRIG 64 02/07/2025    CHOLHDL 34.7 02/07/2025     DM:   Lab Results   Component Value Date    HGBA1C 5.7 (H) 03/06/2024    HGBA1C 5.6 10/12/2023    HGBA1C 5.8 05/30/2013    GLUF 103 05/19/2014    LDLCALC 53.2 (L) 02/07/2025    CREATININE 0.7 06/18/2025     Thyroid:   Lab Results   Component Value Date    TSH 0.261 (L) 06/01/2025    FREET4 0.99 06/01/2025    W1JIWVC 9.1 03/06/2024    Y9CHFYI 86 03/07/2017     Anemia:   Lab Results   Component Value Date    IRON 21 (L) 06/18/2025    TIBC 157 (L) 06/18/2025    FERRITIN 2,096.0 (H) 06/18/2025    XBCUHETI28 474 08/29/2024    FOLATE 4.5 08/29/2024     Cardiac:   Lab Results   Component Value Date    TROPONINI 10.800 (H) 06/08/2025    BNP 63 06/18/2025     Urinalysis:   Lab Results   Component Value Date    COLORU Yellow 06/09/2025    SPECGRAV 1.010 06/09/2025    NITRITE Negative 06/09/2025    KETONESU Trace (A) 02/07/2025    UROBILINOGEN Negative 06/09/2025    WBCUA 4 06/09/2025       Trended Lab Data:  Recent Labs   Lab 06/16/25  0429 06/18/25  0514 06/18/25  0754   WBC 13.54* 10.74 17.04*   HGB 10.6* 11.7* 11.7*   * 98* 240   MCV 91 87 88   RDW 16.9* 16.7* 17.0*    136 138   K 3.8 3.5 3.3*    103 100   CO2 27 22* 27   BUN 21 18 19   * 106 129*   CALCIUM 8.7 8.5* 9.0   PROT 5.0* 5.1* 5.8*   ALBUMIN 2.0* 1.8* 2.0*   BILITOT 0.6 0.6 0.8   AST 25 25 20   ALKPHOS 104 103 120   ALT 75* 51* 52*       Trended Cardiac Data:  Recent Labs   Lab  "06/16/25  1023 06/18/25  0754   BNP 71 63       Micro Results  No components found for: "CBLOOD"  No components found for: "CURINE"  No components found for: "CRESPWSM"    Radiology:  US Lower Extremity Veins Bilateral  Result Date: 6/18/2025  EXAMINATION: US LOWER EXTREMITY VEINS BILATERAL CLINICAL HISTORY: r/o dvt; TECHNIQUE: Duplex and color flow Doppler and dynamic compression was performed of the bilateral lower extremity veins was performed. COMPARISON: 05/22/2025 FINDINGS: Duplex and color flow Doppler evaluation does not reveal any evidence of acute venous thrombosis in the common femoral, superficial femoral, greater saphenous, popliteal, peroneal, anterior tibial and posterior tibial veins bilaterally.  There is no reflux to suggest valvular incompetence.  Please note, there is scattered slow flow throughout the bilateral lower extremities.     No evidence of deep venous thrombosis in either lower extremity. Electronically signed by: Artur Velazquez MD Date:    06/18/2025 Time:    16:02    CT Chest Without Contrast  Result Date: 6/18/2025  EXAMINATION: CT CHEST WITHOUT CONTRAST CLINICAL HISTORY: Pneumonia, unresolved; TECHNIQUE: Using low dose technique the chest was surveyed from above the pulmonary apices through the posterior costophrenic angles.  Data was reconstructed for multiplanar images in axial, sagittal and coronal planes and for maximal intensity projection images in the the axial, sagittal and coronal planes. COMPARISON: Multiple priors, most recent chest x-ray 06/18/2025 and CTA 06/10/2025 FINDINGS: Support tubes and lines: None. Aorta: Mild ectasia of the ascending aorta measuring 4.1 cm, similar to prior.  Mild atherosclerosis.  Aberrant right subclavian artery. Heart: Normal size. Coronary arteries: Multi-vessel calcific atherosclerosis of the coronary arteries. Pericardium: Trace pericardial effusion. Central pulmonary arteries: Normal caliber. Base of neck/thyroid: Unremarkable. Lymph " nodes: No supraclavicular, axillary, internal mammary, mediastinal, or hilar adenopathy. Esophagus: Unremarkable. Pleura: Bilateral small pleural effusions with adjacent compressive atelectasis, new from prior. Upper abdomen: Fatty atrophy of the pancreas, unchanged.  Accessory splenules. Body wall: Unremarkable. Airways: Unremarkable. Lungs: Paraseptal and centrilobular emphysema.  Right and left ground-glass opacities, mostly affecting the lower lobes, new from prior.  Treatment changes of the left hilar region with associated volume loss and architectural distortion.  Stable 4 mm nodule in the left upper lobe. Bones: Degenerative changes of the spine.  Unchanged sclerotic focus in the posterior elements of T9.  Height loss of the superior endplate of L1, unchanged.     Interval development of bilateral small pleural effusions. Right and left ground-glass opacities, new from prior, concerning for progression of infectious process. Additional findings as above. Electronically signed by resident: Madeline Iniguez Date:    06/18/2025 Time:    10:07 Electronically signed by: Geovanny Dennis Date:    06/18/2025 Time:    11:16    X-Ray Chest AP Portable  Result Date: 6/18/2025  EXAMINATION: XR CHEST AP PORTABLE CLINICAL HISTORY: Chest Pain; TECHNIQUE: Single frontal view of the chest was performed. COMPARISON: 2025 FINDINGS: Similar perihilar and lower lobe infiltrates within both lungs.  Findings suggestive of pneumonia versus pulmonary edema depending upon clinical scenario.  Stable appearance of the cardiomediastinal contours.  Probable small bilateral pleural effusions.     See above Electronically signed by: Solitario Rivas Date:    06/18/2025 Time:    08:11    X-Ray Chest 1 View  Result Date: 6/17/2025  EXAMINATION: XR CHEST 1 VIEW CLINICAL HISTORY: worsening hypoxia; FINDINGS: Chest one view: Heart size is normal.  There is perihilar and lower lobe edema and/or infiltrate.  Bones bowel gas are noncontributory.   There is emphysema.     Pulmonary edema versus pneumonia. Emphysema. Electronically signed by: Alexander Blankenship MD Date:    06/17/2025 Time:    07:58    Echo  Result Date: 6/11/2025    Left Ventricle: The left ventricle is normal in size. Ventricular mass is normal. Normal wall thickness. There is low normal systolic function with a visually estimated ejection fraction of 50 - 55%.   Inferolateral pseudodyskinesis   Right Ventricle: Right ventricle was not well visualized due to poor acoustic window. The right ventricle is normal in size Systolic function is mildly reduced.   IVC/SVC: Intermediate venous pressure at 8 mmHg.   RV function not well seen.     CTA Chest Non-Coronary (PE Studies)  Result Date: 6/10/2025  EXAMINATION: CTA CHEST NON CORONARY (PE STUDIES) CLINICAL HISTORY: r/o PE, signifcant tachycardia; TECHNIQUE: Low dose axial images, sagittal and coronal reformations were obtained from the thoracic inlet to the lung bases following the IV administration of 100 mL of Omnipaque 350.  Contrast timing was optimized to evaluate the pulmonary arteries.  MIP images were performed. COMPARISON: None FINDINGS: Pulmonary arteries:Pulmonary arteries are adequately enhanced.No filling defects to indicate pulmonary embolism. Thoracic soft tissues: Unremarkable. Aorta: Mild aneurysmal dilation of the ascending thoracic aorta to approximately 4.2 cm axial 256 of series 2. Heart: Normal size. No effusion. Nathalie/Mediastinum: No pathologic carolyn enlargement. Airways: Patent. Lungs/Pleura: Prominent emphysematous changes throughout the lungs. Mild interstitial infiltrate or scarring posterior right upper lobe. Mild left basilar atelectasis. Minimal left upper lobe ground-glass infiltrate. Esophagus: Unremarkable. Upper Abdomen: No abnormality of the partially imaged upper abdomen. Bones: Moderate chronic compression fracture superior endplate of L1. Aberrant right subclavian artery noted as an anatomic variant.     1. No  evidence of pulmonary embolism. 2. Mild aneurysm dilation of the ascending thoracic aorta to approximately 4.2 cm.  Recommend surveillance. 3. Emphysematous changes of the lungs. 4. Mild interstitial infiltrate or scarring in the posterior right upper lobe and minimal left upper lobe infiltrate.  Minimal pneumonitis is not excluded. 5. Aberrant right subclavian artery noted as an anatomic variant. 6. Moderate chronic compression fracture of the superior endplate of L1 Electronically signed by: Ryan Jackson Date:    06/10/2025 Time:    21:38    CTA Chest Non-Coronary (PE Studies)  Result Date: 6/9/2025  EXAMINATION: CTA CHEST NON CORONARY (PE STUDIES) CLINICAL HISTORY: Pulmonary embolism (PE) suspected, high prob; TECHNIQUE: During intravenous bolus injection of 100 ml of Omnipaque 350 contrast medium and using low dose technique, the chest was surveyed from above the pulmonary apices through the posterior costophrenic angles data was reconstructed for multiplanar images in axial, sagittal and coronal planes and for maximal intensity projection images in the the axial, sagittal and coronal planes. COMPARISON: Multiple priors, most recent CTA 05/22/2025 FINDINGS: Exam quality: Satisfactory. Pulmonary embolism: No acute or chronic pulmonary embolism. Central pulmonary arteries: Normal caliber. Aorta: Normal caliber.  Mild atherosclerosis.  Aberrant right subclavian artery. Heart: No right heart strain. Normal size. Coronary arteries: Multi-vessel calcific atherosclerosis of the coronary arteries. Pericardium: Normal. No effusion, thickening, or calcification. Support tubes and lines: None. Base of neck/thyroid: Normal. Lymph nodes: No supraclavicular, axillary, internal mammary, mediastinal, or hilar adenopathy. Esophagus: Normal. Pleura: No effusion, thickening, or calcification. Upper abdomen: Accessory splenule.  Fatty atrophy of the pancreas. Body wall: Unremarkable Airways: Normal. Lungs: Centrilobular and  paraseptal emphysema.  Treatment changes of the left hilar region with associated volume loss and architectural distortion, stable when compared to prior imaging.  Right bandlike scarring versus atelectasis.  Left lower lobe calcified granuloma.  Ill-defined airspace opacities present in the dependent portions of the right upper and middle lobes are new when compared to 05/22/2025.  A 4 mm nodule in the left upper lobe (series 3, image 257) is unchanged from 05/22/2025 but new when compared to 03/05/2025. Bones: Degenerative changes of the spine.  Height loss of the superioror endplate of L1, unchanged from 02/20/2025.  Unchanged sclerotic focus in the posterior elements of T9.     No evidence of acute pulmonary embolism. New mild ill-defined airspace opacities in the dependent portions of the right upper and middle lobes when compared to 05/22/2025, nonspecific.  This could be due to atelectasis, aspiration and/or pneumonia. 5 mm left upper lobe nodule, unchanged from 05/22/2025 but new when compared to 03/05/2025.  A follow-up chest CT in 1 year could be considered if the patient is at increased risk of developing lung cancer, such as from a smoking history. Additional findings as above. Electronically signed by resident: Madeline Iniguez Date:    06/09/2025 Time:    10:55 Electronically signed by: Anna Brown Date:    06/09/2025 Time:    11:55    X-Ray Chest AP Portable  Result Date: 6/9/2025  EXAMINATION: XR CHEST AP PORTABLE CLINICAL HISTORY: sob; TECHNIQUE: Single frontal view of the chest was performed. COMPARISON: 06/01/25 FINDINGS: Cardiac size is normal.  Lungs are clear with no significant infiltrate or vascular congestion.  No pleural fluid is seen.     See above Electronically signed by: Prince Ovalle MD Date:    06/09/2025 Time:    09:29    X-Ray Chest AP Portable  Result Date: 6/1/2025  EXAMINATION: XR CHEST AP PORTABLE CLINICAL HISTORY: palpitations; TECHNIQUE: Single frontal view of the chest  was performed. COMPARISON: 05/26/2025. FINDINGS: The lungs are well expanded and clear. No focal opacities are seen. The pleural spaces are clear. The cardiac silhouette is unremarkable. The visualized osseous structures are unremarkable.     No acute abnormality. Electronically signed by: Popeye Mckeon Date:    06/01/2025 Time:    22:33    X-Ray Chest AP Portable  Result Date: 5/26/2025  EXAMINATION: XR CHEST AP PORTABLE CLINICAL HISTORY: sob; FINDINGS: Chest one view AP portable. Heart size is normal.  Lungs are clear.  The bones are noncontributory.     No acute process seen. Electronically signed by: Alexander Blankenship MD Date:    05/26/2025 Time:    09:16    X-Ray Chest AP Portable  Result Date: 5/25/2025  EXAMINATION: XR CHEST AP PORTABLE CLINICAL HISTORY: chf; TECHNIQUE: Single frontal view of the chest was performed. COMPARISON: 05/22/2025 FINDINGS: The cardiomediastinal silhouette is stable in configuration noting calcification of the aorta..  There is no pleural effusion.  The trachea is midline.  The lungs are symmetrically expanded bilaterally with coarse interstitial attenuation, similar to the previous examination.  There is increased parenchymal attenuation projected over the medial aspect of the right lower lung zone, may reflect atelectasis, developing consolidation not excluded..  There is no pneumothorax.  The osseous structures are unchanged..     As above Electronically signed by: Artur Velazquez MD Date:    05/25/2025 Time:    19:17    Echo  Result Date: 5/23/2025    Tachycardia was present during the study   Erall the study quality was technically difficult. valves not well visualized.   Left Ventricle: The left ventricle is normal in size. Normal wall thickness. Normal wall motion. There is normal systolic function with a visually estimated ejection fraction of 65 - 70%. There is normal diastolic function. Normal left ventricular filling pressure.   Right Ventricle: The right ventricle is normal  in size Wall thickness is normal. Systolic function is normal.   Left Atrium: Left atrium was not well visualized.   Right Atrium: Not well visualized due to poor acoustic window.   Aortic Valve: Not well visualized due to poor acoustic window.   Mitral Valve: Not well visualized due to poor acoustic window.   Tricuspid Valve: Not well visualized due to poor acoustic window.   Pulmonic Valve: Not well visualized due to poor acoustic window.   Aorta: The aortic root is normal in size measuring 2.79 cm. The ascending aorta is normal in size measuring 2.7 cm.   IVC/SVC: Normal venous pressure at 3 mmHg.   Pericardium: There is no pericardial effusion.     US Lower Extremity Veins Bilateral  Result Date: 5/22/2025  EXAMINATION: US LOWER EXTREMITY VEINS BILATERAL CLINICAL HISTORY: dvt; TECHNIQUE: Duplex and color flow Doppler and dynamic compression was performed of the bilateral lower extremity veins was performed. COMPARISON: 05/15/2025. FINDINGS: Right thigh veins: The common femoral, femoral, popliteal, upper greater saphenous, and deep femoral veins are patent and free of thrombus. The veins are normally compressible and have normal phasic flow and augmentation response. Right calf veins: The visualized calf veins are patent. Left thigh veins: The common femoral, femoral, popliteal, upper greater saphenous, and deep femoral veins are patent and free of thrombus. The veins are normally compressible and have normal phasic flow and augmentation response. Left calf veins: The visualized calf veins are patent. Miscellaneous: None     No evidence of deep venous thrombosis in either lower extremity. Electronically signed by: Vidal Arroyo MD Date:    05/22/2025 Time:    18:06    CTA Chest Non-Coronary (PE Studies)  Result Date: 5/22/2025  EXAMINATION: CTA CHEST NON CORONARY (PE STUDIES) CLINICAL HISTORY: Pulmonary embolism (PE) suspected, unknown D-dimer; TECHNIQUE: Low dose axial images, sagittal and coronal reformations  were obtained from the thoracic inlet to the lung bases following the IV administration of 100 mL of Omnipaque 350.  Contrast timing was optimized to evaluate the pulmonary arteries.  MIP images were performed. COMPARISON: Radiograph 05/22/2025, CT abdomen and pelvis 05/19/2025.  CT chest 03/05/2025, CTA chest 03/19/2024, CTA chest 12/28/2024 FINDINGS: The structures at the base of the neck are unremarkable.  No significant mediastinal lymphadenopathy.  The heart is not enlarged.  The thoracic aorta tapers normally noting aberrant right subclavian.  There is atherosclerotic calcification of the aorta and in the distribution of the coronary arteries.  The visualized portions of the spleen and adrenal glands are unremarkable.  There is fatty atrophy of the pancreas.  The gallbladder is distended without wall thickening.  The liver is unremarkable.  There is bilateral perinephric fat stranding, correlation with urinalysis recommended. Motion artifact limits evaluation of the airways and pulmonary parenchyma.  There are aerated secretions layering posteriorly within the upper trachea, placing patient at risk for aspiration.  The airways are otherwise patent.  There is bilateral pulmonary emphysematous change.  There is scattered interlobular septal thickening.  There is a nodular focus within the anterior aspect of the left upper lobe measuring in conglomerate 5-6 mm not seen on the previous exam.  There is bilateral basilar dependent atelectasis.  No large focal consolidation.  No pneumothorax. There are post treatment changes within the left hilar region, less conspicuous on current exam. Bolus timing is adequate for evaluation of pulmonary thromboembolism.  Allowing for artifact from motion, no convincing pulmonary arterial filling defects to the level of the segmental branches bilaterally to suggest pulmonary thromboembolism. There is osteopenia.  There are degenerative changes of the spine.  No significant axillary  lymphadenopathy.  There is height loss involving the superior endplate of L1 new since examination 03/05/2025, correlation with any focal tenderness recommended.  There is a sclerotic focus within the posterior elements of T9 stable.     1. Allowing for motion artifact, no convincing pulmonary thromboembolism.  Correlation of these findings with D-dimer and/or lower extremity ultrasound as warranted. 2. Pulmonary emphysematous change noting superimposed mild edema.  There is a nodular focus within the anterior aspect of the left upper lobe, not convincingly seen on the previous examination.  Developing infectious or inflammatory process is a consideration, continued follow-up is recommended to exclude discrete nodule in the setting of malignancy. 3. Aerated secretion within the proximal trachea, places patient at risk for aspiration. 4. Endplate compression deformity of L1, new since examination 03/05/2025 however stable from examination 05/19/2025. 5. Please see above for several additional findings. Electronically signed by: Artur Velazquez MD Date:    05/22/2025 Time:    17:21    X-Ray Foot Complete Left  Result Date: 5/22/2025  EXAMINATION: XR FOOT COMPLETE 3 VIEW LEFT CLINICAL HISTORY: .  Cellulitis, unspecified TECHNIQUE: AP, lateral and oblique views of the left foot were performed. COMPARISON: None FINDINGS: Three views left foot. There is osteopenia.  There are degenerative changes of the foot.  There is remote fracture of the fifth metatarsal.  Bandaging material overlies the lateral aspect of the foot.  No radiopaque foreign body.  There are degenerative changes of the calcaneus.  There is edema primarily about the dorsal aspect of the foot.     1. No convincing acute displaced fracture or dislocation of the foot noting diffuse edema particularly along the dorsal aspect.  Correlation is advised. Electronically signed by: Artur Velazquez MD Date:    05/22/2025 Time:    14:46    X-Ray Chest AP  Portable  Result Date: 5/22/2025  EXAMINATION: XR CHEST AP PORTABLE CLINICAL HISTORY: CHF; TECHNIQUE: Single frontal view of the chest was performed. COMPARISON: 02/07/2025 FINDINGS: The cardiomediastinal silhouette is not enlarged noting calcification of the aorta..  There is no pleural effusion.  The trachea is midline.  The lungs are symmetrically expanded bilaterally with coarse interstitial attenuation bilaterally noting scattered regions of bandlike atelectasis, similar to the previous examination.  No large focal consolidation seen.  There is no pneumothorax.  The osseous structures are remarkable for degenerative change..     1. Chronic appearing interstitial findings, superimposed edema remains a consideration.  No large focal consolidation. Electronically signed by: Artur Velazquez MD Date:    05/22/2025 Time:    13:15      Recent Results (from the past 24 hours)   EKG 12-lead    Collection Time: 06/17/25  7:39 PM   Result Value Ref Range    QRS Duration 86 ms    [facility] QTC Calculation 427 ms   Magnesium    Collection Time: 06/18/25  5:14 AM   Result Value Ref Range    Magnesium  1.9 1.6 - 2.6 mg/dL   Comprehensive Metabolic Panel    Collection Time: 06/18/25  5:14 AM   Result Value Ref Range    Sodium 136 136 - 145 mmol/L    Potassium 3.5 3.5 - 5.1 mmol/L    Chloride 103 95 - 110 mmol/L    CO2 22 (L) 23 - 29 mmol/L    Glucose 106 70 - 110 mg/dL    BUN 18 8 - 23 mg/dL    Creatinine 0.7 0.5 - 1.4 mg/dL    Calcium 8.5 (L) 8.7 - 10.5 mg/dL    Protein Total 5.1 (L) 6.0 - 8.4 gm/dL    Albumin 1.8 (L) 3.5 - 5.2 g/dL    Bilirubin Total 0.6 0.1 - 1.0 mg/dL     40 - 150 unit/L    AST 25 11 - 45 unit/L    ALT 51 (H) 10 - 44 unit/L    Anion Gap 11 8 - 16 mmol/L    eGFR >60 >60 mL/min/1.73/m2   Phosphorus    Collection Time: 06/18/25  5:14 AM   Result Value Ref Range    Phosphorus Level 2.4 (L) 2.7 - 4.5 mg/dL   CBC with Differential    Collection Time: 06/18/25  5:14 AM   Result Value Ref Range    WBC 10.74 3.90  - 12.70 K/uL    RBC 4.14 (L) 4.60 - 6.20 M/uL    HGB 11.7 (L) 14.0 - 18.0 gm/dL    HCT 36.1 (L) 40.0 - 54.0 %    MCV 87 82 - 98 fL    MCH 28.3 27.0 - 31.0 pg    MCHC 32.4 32.0 - 36.0 g/dL    RDW 16.7 (H) 11.5 - 14.5 %    Platelet Count 98 (L) 150 - 450 K/uL    MPV 11.3 9.2 - 12.9 fL    Nucleated RBC 0 <=0 /100 WBC    Neut % 82.5 (H) 38 - 73 %    Lymph % 3.6 (L) 18 - 48 %    Mono % 8.2 4 - 15 %    Eos % 2.5 <=8 %    Basophil % 0.3 <=1.9 %    Imm Grans % 2.9 (H) 0.0 - 0.5 %    Neut # 8.86 (H) 1.8 - 7.7 K/uL    Lymph # 0.39 (L) 1 - 4.8 K/uL    Mono # 0.88 0.3 - 1 K/uL    Eos # 0.27 <=0.5 K/uL    Baso # 0.03 <=0.2 K/uL    Imm Grans # 0.31 (H) 0.00 - 0.04 K/uL   EKG 12-lead    Collection Time: 06/18/25  7:53 AM   Result Value Ref Range    QRS Duration 74 ms    [facility] QTC Calculation 396 ms   Comprehensive metabolic panel    Collection Time: 06/18/25  7:54 AM   Result Value Ref Range    Sodium 138 136 - 145 mmol/L    Potassium 3.3 (L) 3.5 - 5.1 mmol/L    Chloride 100 95 - 110 mmol/L    CO2 27 23 - 29 mmol/L    Glucose 129 (H) 70 - 110 mg/dL    BUN 19 8 - 23 mg/dL    Creatinine 0.7 0.5 - 1.4 mg/dL    Calcium 9.0 8.7 - 10.5 mg/dL    Protein Total 5.8 (L) 6.0 - 8.4 gm/dL    Albumin 2.0 (L) 3.5 - 5.2 g/dL    Bilirubin Total 0.8 0.1 - 1.0 mg/dL     40 - 150 unit/L    AST 20 11 - 45 unit/L    ALT 52 (H) 10 - 44 unit/L    Anion Gap 11 8 - 16 mmol/L    eGFR >60 >60 mL/min/1.73/m2   Troponin I High Sensitivity #1    Collection Time: 06/18/25  7:54 AM   Result Value Ref Range    Troponin High Sensitive 18 <=35 ng/L   Troponin I High Sensitivity #2    Collection Time: 06/18/25  7:54 AM   Result Value Ref Range    Troponin High Sensitive 20 <=35 ng/L   B-Type natriuretic peptide (BNP)    Collection Time: 06/18/25  7:54 AM   Result Value Ref Range    BNP 63 0 - 99 pg/mL   CBC with Differential    Collection Time: 06/18/25  7:54 AM   Result Value Ref Range    WBC 17.04 (H) 3.90 - 12.70 K/uL    RBC 4.05 (L) 4.60 - 6.20 M/uL    HGB  11.7 (L) 14.0 - 18.0 gm/dL    HCT 35.6 (L) 40.0 - 54.0 %    MCV 88 82 - 98 fL    MCH 28.9 27.0 - 31.0 pg    MCHC 32.9 32.0 - 36.0 g/dL    RDW 17.0 (H) 11.5 - 14.5 %    Platelet Count 240 150 - 450 K/uL    MPV 10.8 9.2 - 12.9 fL    Nucleated RBC 0 <=0 /100 WBC    Neut % 81.8 (H) 38 - 73 %    Lymph % 5.3 (L) 18 - 48 %    Mono % 7.5 4 - 15 %    Eos % 2.1 <=8 %    Basophil % 0.2 <=1.9 %    Imm Grans % 3.1 (H) 0.0 - 0.5 %    Neut # 13.92 (H) 1.8 - 7.7 K/uL    Lymph # 0.91 (L) 1 - 4.8 K/uL    Mono # 1.28 (H) 0.3 - 1 K/uL    Eos # 0.36 <=0.5 K/uL    Baso # 0.04 <=0.2 K/uL    Imm Grans # 0.53 (H) 0.00 - 0.04 K/uL   Magnesium    Collection Time: 06/18/25  7:54 AM   Result Value Ref Range    Magnesium  1.9 1.6 - 2.6 mg/dL   Phosphorus    Collection Time: 06/18/25  7:54 AM   Result Value Ref Range    Phosphorus Level 1.8 (L) 2.7 - 4.5 mg/dL   Platelet Review    Collection Time: 06/18/25  7:54 AM   Result Value Ref Range    Platelet Estimate Appears Normal     POCT Venous Blood Gas    Collection Time: 06/18/25  7:59 AM   Result Value Ref Range    POC PH 7.542 (H) 7.350 - 7.450    POC PCO2 33.2 (L) 35.0 - 45.0 mmHg    POC PO2 29.3 (L) 40.0 - 60.0 mmHg    POC Potassium 3.4 (L) 3.5 - 5.1 mmol/L    POC Sodium 134 (L) 136 - 145 mmol/L    POC Ionized Calcium 0.99 (L) 1.06 - 1.42 mmol/L    POC Lactate 1.6 0.5 - 2.2 mmol/L    POC Hematocrit 40.2 36.0 - 54.0 %    POC HCO3 29.2 24.0 - 28.0 mmol/l    Base Deficit 6.1 mmol/l    Specimen source Venous     Performed By: LEILA     BIPAP 0    EKG 12-lead    Collection Time: 06/18/25  8:06 AM   Result Value Ref Range    QRS Duration 70 ms    [facility] QTC Calculation 571 ms   Troponin I High Sensitivity    Collection Time: 06/18/25  9:48 AM   Result Value Ref Range    Troponin High Sensitive 15 <=35 ng/L   Vancomycin, Random    Collection Time: 06/18/25  9:48 AM   Result Value Ref Range    Vancomycin Random 9.6 Not established ug/ml   Iron and TIBC    Collection Time: 06/18/25  9:48 AM   Result  Value Ref Range    Iron Level 21 (L) 45 - 160 ug/dL    Transferrin 106 (L) 200 - 375 mg/dL    Iron Binding Capacity Total 157 (L) 250 - 450 ug/dL    Iron Saturation 13 (L) 20 - 50 %   Ferritin    Collection Time: 06/18/25  9:48 AM   Result Value Ref Range    Ferritin 2,096.0 (H) 20.0 - 300.0 ng/mL   Procalcitonin    Collection Time: 06/18/25 11:55 AM   Result Value Ref Range    Procalcitonin 0.71 (H) <0.25 ng/mL   Lactic acid, plasma    Collection Time: 06/18/25 11:55 AM   Result Value Ref Range    Lactic Acid Level 1.2 0.5 - 2.2 mmol/L   POCT Venous Blood Gas    Collection Time: 06/18/25 12:00 PM   Result Value Ref Range    POC PH 7.407 7.350 - 7.450    POC PCO2 49.3 (H) 35.0 - 45.0 mmHg    POC PO2 30.8 (L) 40.0 - 60.0 mmHg    Correct Temperature (PH) 7.407     Corrected Temperature (pCO2) 49.3 mmHg    Corrected Temperature (pO2) 30.8 mmHg    POC HCO3 28.5 (H) 24.0 - 28.0 mmol/l    Base Deficit 5.4 mmol/l    POC Temp 37.0 C    Specimen source Venous     Performed By: NOY     FiO2 21.0 %    BIPAP 0    POCT Venous Blood Gas    Collection Time: 06/18/25  1:39 PM   Result Value Ref Range    POC PH 7.471 (H) 7.350 - 7.450    POC PCO2 38.7 35.0 - 45.0 mmHg    POC PO2 32.9 (L) 40.0 - 60.0 mmHg    POC HCO3 27.7 24.0 - 28.0 mmol/l    Base Deficit 4.3 mmol/l    Specimen source Venous     Performed By: LEILA     BIPAP 0      A1C   Lab Results   Component Value Date    HGBA1C 5.7 (H) 03/06/2024         Assessment/Plan:     Cellulitis to Left lower leg -resolved  Dermatitis to left foot   -wash with bath wipes apply lac-hydrin daily per bedside nurse     MASD to Sacrum and Buttocks   Wound care clean with bath wipes apply Triad daily  Turn patient every 2 hours    Rash to:  RIGHT & LEFT GROIN  RIGHT & LEFT ABDOMEN  -clean with bath wipes apply antifungal powder daily per bedside nurse     Pressure wound stage I to right top ankle -resolved  -leave JOSSUE monitor     Decreased Mobility   -Patient with decreased bed mobility  therefore patient is at high risk for developing wounds and deterioration of any current wounds. Patient needs frequent turning.     Nutrition :  Promote good nutrition to for improved wound healing.      Off-loading:   Follow facility bed policy for proper bed surface   Offload heels per facility protocol   Turn every 2 hours   Use Wheelchair Cushion     Patient Treatment Goals:  Short Term Goals  The wound base will be 25% .,demonstrate 25% more granulation tissue.  Short Term Goals  Patient wound status will improve/maintain without exacerbation infection of deterioration.  Wound Healing  Patient will have a decrease in wound size by 20% in 4 weeks.  Clinical Goals  Prevention of Infection is important for wound healing  Functional Goals:  The patient will achieve proper turning schedule.    -cont medical management     High Risk Because  -Chronic illness with SEVERE exacerbation, progression, or side effects of treatment.  -Acute or chronic illness or injury that poses a threat to life or bodily function    Notify Sheree Tao NP, or wound care RN if any new issues arise or if there is deterioration in documented wounds.    Sheree Tao FNP-C                   [1]   Social History  Tobacco Use    Smoking status: Former     Current packs/day: 0.00     Average packs/day: 1 pack/day for 40.0 years (40.0 ttl pk-yrs)     Types: Cigarettes     Start date: 8/15/1972     Quit date: 8/15/2012     Years since quittin.8     Passive exposure: Never    Smokeless tobacco: Never   Substance Use Topics    Alcohol use: Yes     Alcohol/week: 3.0 standard drinks of alcohol     Types: 3 Cans of beer per week     Comment: maybe 2-3  beers weekly    Drug use: No

## 2025-06-18 NOTE — CARE UPDATE
Notified by nurse that he has had sustained Tachycardia 130s-150s. Pt is on a cardizem drip, which was increases to 10mg/hr @ approx 1830. Pt is sleeping/asymptomatic. Current VS are 112/75, 97.8, O2 90%. Allergies to lisinopril/metoprolol.    By the time EKG was done, showed sinus tachy with PAC's rate 107 on Dilt.    -NS bolus 500 cc x 1  -Continue to monitor for now, as rate of 107 is acceptable  -Apparently goes in and out of Afib RVR

## 2025-06-18 NOTE — CONSULTS
[facility] - Emergency Dept  Pulmonology  Consult Note    Patient Name: Jordin Allen Jr.  MRN: [id number]  Admission Date: 6/18/2025  Hospital Length of Stay: 0 days  Code Status: Prior  Attending Physician: Linda Nguyễn MD  Primary Care Provider: Sierra Landry MD   Principal Problem: <principal problem not specified>    Inpatient consult to Pulmonology  Consult performed by: Tyler Bolanos,   Consult ordered by: Kenny Cool MD        Subjective:     HPI:  Mr. 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 To note, patient has presented/been admitted to the hospital 3x in less than 1 month for worsening SOB and COPD exacerbation. First on 5/22, again on 6/9, and now currently 6/18. He is treated with abx, steroids, and inhalers including duonebs, along with HFNC, which correlates to the LTAC oxygen weaning. To know resp culutres have been negative but RIP positive for parainfluenza last admission.     Past Medical History:   Diagnosis Date    Benign neoplasm of colon 10/01/2013    Bronchitis chronic     CAD (coronary artery disease) 10/31/2013    Cataract 2008    COPD (chronic obstructive pulmonary disease)     Emphysema of lung     Encounter for preventive health examination 03/21/2018    GERD (gastroesophageal reflux disease)     Glaucoma 2010    Hearing loss 2015    Heart attack 2013    Hx of colonic polyps     Hypertension     NAFLD (nonalcoholic fatty liver disease) 03/27/2017    SHOLA on CPAP     RLS (restless legs syndrome)     Screen for colon cancer 08/30/2013       Past Surgical History:   Procedure Laterality Date    bilateral foot surgery  1993    CARDIAC CATHETERIZATION  2013    x1     CATARACT EXTRACTION, BILATERAL      COLON SURGERY      Ace Mott MD    COLONOSCOPY N/A 2018    Procedure: COLONOSCOPY;  Surgeon: Terry Mott MD;  Location: [facility] ([facility] FLR);  Service: Endoscopy;  Laterality: N/A;    COLONOSCOPY N/A 2019    Procedure: COLONOSCOPY;  Surgeon: John Mantilla MD;  Location: [facility] ENDO (4TH FLR);  Service: Endoscopy;  Laterality: N/A;    COLONOSCOPY N/A 2022    Procedure: COLONOSCOPY;  Surgeon: MEDINA Farris MD;  Location: [facility] ENDO ([facility] FLR);  Service: Endoscopy;  Laterality: N/A;  fully vacc-inst portal-tb    ENDOBRONCHIAL ULTRASOUND Bilateral 2024    Procedure: ENDOBRONCHIAL ULTRASOUND (EBUS);  Surgeon: Naveed Aguero MD;  Location: [facility] OR;  Service: Pulmonary;  Laterality: Bilateral;    EYE SURGERY      scrotal abscess removal      TYMPANOSTOMY TUBE PLACEMENT         Review of patient's allergies indicates:   Allergen Reactions    Brilinta [ticagrelor] Itching    Lisinopril      Other reaction(s): cough    Metoprolol succinate Rash       Family History       Problem Relation (Age of Onset)    Diabetes Maternal Grandmother    Heart attack Mother    Heart disease Mother    Hypertension Mother    No Known Problems Sister, Daughter          Tobacco Use    Smoking status: Former     Current packs/day: 0.00     Average packs/day: 1 pack/day for 40.0 years (40.0 ttl pk-yrs)     Types: Cigarettes     Start date: 8/15/1972     Quit date: 8/15/2012     Years since quittin.8     Passive exposure: Never    Smokeless tobacco: Never   Substance and Sexual Activity    Alcohol use: Yes     Alcohol/week: 3.0 standard drinks of alcohol     Types: 3 Cans of beer per week     Comment: maybe 2-3  beers weekly    Drug use: No    Sexual activity: Yes     Partners: Female         Review of Systems   Constitutional:  Negative for fatigue.   Respiratory:  Positive for shortness of breath and wheezing.    Gastrointestinal:  Negative for abdominal  distention and abdominal pain.   Objective:     Vital Signs (Most Recent):  Temp: 98.2 °F (36.8 °C) (06/18/25 0757)  Pulse: 91 (06/18/25 0931)  Resp: 20 (06/18/25 0931)  BP: 113/60 (06/18/25 0931)  SpO2: 97 % (06/18/25 0931) Vital Signs (24h Range):  Temp:  [97.2 °F (36.2 °C)-98.2 °F (36.8 °C)] 98.2 °F (36.8 °C)  Pulse:  [] 91  Resp:  [16-41] 20  SpO2:  [85 %-100 %] 97 %  BP: ()/(56-72) 113/60     Weight: 90.7 kg (200 lb)  Body mass index is 29.53 kg/m².      Intake/Output Summary (Last 24 hours) at 6/18/2025 1034  Last data filed at 6/18/2025 0902  Gross per 24 hour   Intake 1000 ml   Output 1850 ml   Net -850 ml        Physical Exam  Constitutional:       General: He is in acute distress.      Appearance: Normal appearance. He is ill-appearing. He is not diaphoretic.   HENT:      Head: Normocephalic and atraumatic.   Cardiovascular:      Rate and Rhythm: Normal rate and regular rhythm.      Heart sounds: No murmur heard.  Pulmonary:      Effort: Tachypnea, accessory muscle usage, prolonged expiration and respiratory distress present. No bradypnea.      Breath sounds: No wheezing.   Abdominal:      General: There is no distension.      Tenderness: There is no abdominal tenderness.   Skin:     General: Skin is warm and dry.   Neurological:      General: No focal deficit present.      Mental Status: He is alert and oriented to person, place, and time.   Psychiatric:         Mood and Affect: Mood normal.         Behavior: Behavior normal.      Vents:  Oxygen Concentration (%): 60 (06/18/25 0914)    Lines/Drains/Airways       Peripheral Intravenous Line  Duration                  Midline Catheter - Single Lumen 06/14/25 1245 Left basilic vein (medial side of arm) other (see comments) 3 days         Peripheral IV - Single Lumen 06/16/25 1033 22 G 1 1/4 in Anterior;Right Wrist 2 days         Peripheral IV - Single Lumen 06/18/25 0755 18 G Right Antecubital <1 day                    Significant  Labs:    CBC/Anemia Profile:  Recent Labs   Lab 06/18/25  0514 06/18/25  0754 06/18/25  0759   WBC 10.74 17.04*  --    HGB 11.7* 11.7*  --    HCT 36.1* 35.6* 40.2   PLT 98* 240  --    MCV 87 88  --    RDW 16.7* 17.0*  --         Chemistries:  Recent Labs   Lab 06/18/25  0514 06/18/25  0754    138   K 3.5 3.3*    100   CO2 22* 27   BUN 18 19   CREATININE 0.7 0.7   CALCIUM 8.5* 9.0   ALBUMIN 1.8* 2.0*   PROT 5.1* 5.8*   BILITOT 0.6 0.8   ALKPHOS 103 120   ALT 51* 52*   AST 25 20   MG 1.9 1.9   PHOS 2.4* 1.8*       All pertinent labs within the past 24 hours have been reviewed.    Significant Imaging:   I have reviewed all pertinent imaging results/findings within the past 24 hours.    Assessment/Plan:     Pulmonary  COPD with acute exacerbation  Patient's COPD is with exacerbation noted by continued dyspnea, use of accessory muscles for breathing, paradoxical chest wall movement, and worsening of baseline hypoxia currently.  Patient is currently on COPD Pathway. Continue scheduled inhalers Steroids, Antibiotics, and Supplemental oxygen and monitor respiratory status closely.     Plan:   - Patient with severe/end stage emphysema c/b NSCLS c/b radiation necrosis who presents to the ED from LTAC for wrosening hypoxia. To note, this is the third admission in 1 month for repeated exacerbations in which he represents from LTACH.  - Recommend palliative care consult, last note from 9/2024 (which supported an idea of QOL at the time)  when patient was still on therapy for his malignancy van in setting of frequent hospitalizations    - Agree with levaalubterol given afib/aflutter hx   - Restart home LAMA-LABA and ICS while inpatient    - Findings on CT scan and clinical course consistent with possible aspiration Pna. Given coverage, cover with vancomycin (MRSA nares unreliable given prior mupirocin usage), cefepime, and Levaquin for double pseudomonal coverage   - May benefits from chronic azithro to decrease  hospitalizations post PNA treatment   - Recommend starting hydrocortisone 50mg q6H   - Reculture sputum             Thank you for your consult. I will follow-up with patient. Please contact us if you have any additional questions.     Tyler Bolanos DO  Pulmonology  [facility] - Emergency Dept

## 2025-06-18 NOTE — ACP (ADVANCE CARE PLANNING)
"Advance Care Planning     Date: 06/18/2025    Goals of Care  In light of the patients advanced illness, I engaged the the patient in a voluntary conversation about the patient's preferences for care at the very end of life. Patient states he would want his daughter to be his decision maker and believes he has documentation stating such.  Patient currently on high-flow nasal cannula 60L. He says he is tiring out and wants to go back on BiPAP. I asked him if he would be ok with intubation if this was necessary. He states he would be ok with intubation for a brief period of time. However, he would not want to remain intubated for a prolonged period of time. Also if it doesn't look like his condition is going to improve, "Give me medications to relax me and let me go." (I was not aware at time of encounter that there is a [place] on file from 3/6/25 with a DNR selected) Will need to continue these conversations.     A total of 10 min was spent on advance care planning, goals of care discussion, emotional support, formulating and communicating prognosis and exploring burden/benefit of various approaches of treatment. This discussion occurred on a fully voluntary basis with the verbal consent of the patient and/or family.         Linda Nguyễn MD  Department of Hospital Medicine  [facility]  06/18/2025    "

## 2025-06-18 NOTE — HPI
Mr. 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

## 2025-06-18 NOTE — ED PROVIDER NOTES
Chief Complaint   Chest Pain      History Of Present Illness   Jordin Allen Jr. is a 79 y.o. male presenting with 9/10 chest pain, shortness of breath and fast heart rate that was noted this morning.  The patient is on diltiazem at the LTAC for rate issues, but he became more tachycardic despite the medication.  He also noted this chest pain and breathing trouble.  He was sent here from the LTAC for further evaluation.  He was electrically cardioverted once by EMS due to low blood pressure and high rate, but EMS state that his rate slowed briefly but returned to its current high state.  He is currently being treated for pneumonia.    Independent Historian:  EMS provided most history    Review of patient's allergies indicates:   Allergen Reactions    Brilinta [ticagrelor] Itching    Lisinopril      Other reaction(s): cough    Metoprolol succinate Rash       Medications Ordered Prior to Encounter[1]    Past History   As per HPI and below:  Past Medical History[2]  Past Surgical History[3]    Social History[4]    Family History   Problem Relation Name Age of Onset    Heart disease Mother jc allen     Heart attack Mother jc allen     Hypertension Mother jc allen     No Known Problems Sister      No Known Problems Daughter 2     Diabetes Maternal Grandmother imtiaz jennings     Asthma Neg Hx      Emphysema Neg Hx      Prostate cancer Neg Hx      Cirrhosis Neg Hx         Physical Exam     Vitals:    06/18/25 0845 06/18/25 0900 06/18/25 0914 06/18/25 0931   BP: 95/67   113/60   Pulse: 80 93 89 91   Resp: (!) 24  (!) 34 20   Temp:       SpO2: 99% (!) 94% 96% 97%   Weight:       Height:         Appearance: In acute distress.  Skin: No rashes seen.  Good turgor.  No abrasions.  No ecchymoses.  Chest:  Coarse breath sounds at bases.  Good air movement.  No wheezes.  No rhonchi.  Cardiovascular:  Tachycardic, irregularly irregular.  No murmurs. No gallops. No rubs.  Abdomen: Soft.  Not distended.  Nontender.  No  guarding.  No rebound.  Musculoskeletal: Good range of motion all joints.  No deformities.  Neck supple.  No meningismus.  Neurologic: Motor intact.  Sensation intact.  Cerebellar intact.  Cranial nerves intact.  Mental Status:  Alert and oriented x 3.  Appropriate, conversant.      Initial MDM   AFib RVR with shortness of breath and some hypoxia.  Will do cardiac workup, do rate control and reassess.  Initial EKG does not show STEMI.      Medications Given     Medications   lactated ringers bolus 1,000 mL (0 mLs Intravenous Stopped 6/18/25 0902)   diltiaZEM injection 25 mg (25 mg Intravenous Given 6/18/25 0759)   aspirin tablet 325 mg (325 mg Oral Given 6/18/25 0940)   levalbuterol nebulizer solution 1.25 mg (1.25 mg Nebulization Given 6/18/25 0822)   ipratropium 0.02 % nebulizer solution 0.5 mg (0.5 mg Nebulization Given 6/18/25 0822)       Results and Course     Abnormal Labs Reviewed   COMPREHENSIVE METABOLIC PANEL - Abnormal; Notable for the following components:       Result Value    Potassium 3.3 (*)     Glucose 129 (*)     Protein Total 5.8 (*)     Albumin 2.0 (*)     ALT 52 (*)     All other components within normal limits   CBC WITH DIFFERENTIAL - Abnormal; Notable for the following components:    WBC 17.04 (*)     RBC 4.05 (*)     HGB 11.7 (*)     HCT 35.6 (*)     RDW 17.0 (*)     Neut % 81.8 (*)     Lymph % 5.3 (*)     Imm Grans % 3.1 (*)     Neut # 13.92 (*)     Lymph # 0.91 (*)     Mono # 1.28 (*)     Imm Grans # 0.53 (*)     All other components within normal limits   PHOSPHORUS - Abnormal; Notable for the following components:    Phosphorus Level 1.8 (*)     All other components within normal limits       Imaging Results              CT Chest Without Contrast (In process)                      X-Ray Chest AP Portable (Final result)  Result time 06/18/25 08:11:28      Final result by Solitario Rivas MD (06/18/25 08:11:28)                   Impression:      See above      Electronically signed by: Solitario  Rob  Date:    06/18/2025  Time:    08:11               Narrative:    EXAMINATION:  XR CHEST AP PORTABLE    CLINICAL HISTORY:  Chest Pain;    TECHNIQUE:  Single frontal view of the chest was performed.    COMPARISON:  2025    FINDINGS:  Similar perihilar and lower lobe infiltrates within both lungs.  Findings suggestive of pneumonia versus pulmonary edema depending upon clinical scenario.  Stable appearance of the cardiomediastinal contours.  Probable small bilateral pleural effusions.                                      ED Course as of 06/18/25 0949   Wed Jun 18, 2025   0757 EKG 12-lead  Afib/'s, no STEMI per my independent interpretation.   [DC]   0800 POC Lactate: 1.6 [DC]   0800 POC PH(!): 7.542 [DC]   0809 Hypoxic on NRB; BiPAP initiated [DC]   0810 X-Ray Chest AP Portable  Bibasilar infiltrates per my independent interpretation.   [DC]   0836 WBC(!): 17.04 [DC]   0836 Hemoglobin(!): 11.7 [DC]   0837 Platelet Count: 240 [DC]   0842 Creatinine: 0.7 [DC]   0842 Phosphorus Level(!): 1.8 [DC]   0842 Magnesium : 1.9 [DC]   0848 Discussed with pulmonology [DC]   0947 Troponin I High Sensitivity: 20 [DC]   0947 Discussed with hospital medicine [DC]      ED Course User Index  [DC] Kenny Cool MD         Critical Care   Performed by: Kenny Cool MD   Authorized by: Kenny Cool MD    Total critical care time (exclusive of procedural time) : 55 minutes  Critical care was necessary to treat or prevent imminent or life-threatening deterioration of the following conditions:  afib/rvr, respiratory failure          MDM, Impression and Plan   79 y.o. male with atrial fibrillation with rapid ventricular response, hypoxia, chest discomfort.  The chest discomfort has improved significantly with appropriate oxygen administration.  Heart rate controlled by diltiazem bolus.  Patient is much more comfortable and is breathing easily on high-flow nasal cannula currently.  Pulmonology has asked me to obtain a CT of  the chest evaluate his ongoing pneumonia.  Discussed with hospital medicine for admission/         Final diagnoses:  [R07.9] Chest pain  [I48.91] Atrial fibrillation with RVR (Primary)  [J18.9] Pneumonia of both lower lobes due to infectious organism        ED Disposition Condition    Observation                     [1]   Current Facility-Administered Medications on File Prior to Encounter   Medication Dose Route Frequency Provider Last Rate Last Admin    [ - Suspended Admission] acetaminophen tablet 650 mg  650 mg Oral Q8H PRN Yanet Padilla MD   650 mg at 06/08/25 0913    [ - Suspended Admission] albuterol nebulizer solution 0.6667 mg  0.6667 mg Nebulization Q6H PRN Yanet Padilla MD        [ - Suspended Admission] amitriptyline tablet 25 mg  25 mg Oral QHS Yanet Padilla MD   25 mg at 06/17/25 2104    [ - Suspended Admission] ammonium lactate 12 % lotion   Topical (Top) Daily Sheree Tao FNP-C   Given at 06/17/25 0846    [ - Suspended Admission] apixaban tablet 5 mg  5 mg Oral BID Ernestine Palomino NP   5 mg at 06/17/25 2104    [ - Suspended Admission] aspirin EC tablet 81 mg  81 mg Oral QAM Yanet Padilla MD   81 mg at 06/17/25 0833    [ - Suspended Admission] atorvastatin tablet 80 mg  80 mg Oral Daily Yanet Padilla MD   80 mg at 06/17/25 0833    [ - Suspended Admission] benzonatate capsule 100 mg  100 mg Oral TID PRN Erinn Suggs NP   100 mg at 06/14/25 1726    [ - Suspended Admission] calcium carbonate 200 mg calcium (500 mg) chewable tablet 500 mg  500 mg Oral TID PRN Ernestine Palomino NP   500 mg at 06/05/25 1413    dextrose 50% injection 12.5 g  12.5 g Intravenous PRN Lidia Deleon MD        dextrose 50% injection 25 g  25 g Intravenous PRN Lidia Deleon MD        [ - Suspended Admission] diltiaZEM (CARDIZEM) 125 mg in 0.9% NaCl 125 mL infusion  10 mg/hr Intravenous Continuous Ernestine Palomino NP 10 mL/hr at 06/18/25 0158 10 mg/hr at 06/18/25 0158    [ -  Suspended Admission] fluticasone propionate 50 mcg/actuation nasal spray 100 mcg  2 spray Each Nostril Daily Ernestine Palomino NP   100 mcg at 06/17/25 0846    [COMPLETED] furosemide injection 20 mg  20 mg Intravenous Once Ernestine Palomino NP   20 mg at 06/17/25 1505    [ - Suspended Admission] guaiFENesin 12 hr tablet 1,200 mg  1,200 mg Oral BID Yanet Padilla MD   1,200 mg at 06/17/25 2104    [ - Suspended Admission] hydrOXYzine HCL tablet 25 mg  25 mg Oral TID PRN Yanet Padilla MD   25 mg at 06/18/25 0615    insulin regular injection 0-8 Units  0-8 Units Intravenous Continuous PRN Lidia Deleon MD        [ - Suspended Admission] levalbuterol nebulizer solution 1.2495 mg  1.2495 mg Nebulization Q4H Yanet Padilla MD   1.2495 mg at 06/18/25 0440    [ - Suspended Admission] levETIRAcetam tablet 500 mg  500 mg Oral BID Yanet Padilla MD   500 mg at 06/17/25 2104    [ - Suspended Admission] melatonin tablet 6 mg  6 mg Oral Nightly PRN Yanet Padilla MD   6 mg at 06/15/25 2122    [ - Suspended Admission] methocarbamoL tablet 500 mg  500 mg Oral TID PRN Ernestine Palomino NP   500 mg at 06/17/25 0833    [ - Suspended Admission] mirtazapine tablet 15 mg  15 mg Oral QHS Ernestine Palomino NP   15 mg at 06/17/25 2104    [ - Suspended Admission] naloxone 0.4 mg/mL injection 0.02 mg  0.02 mg Intravenous PRN Yanet Padilla MD        [ - Suspended Admission] nitroGLYCERIN SL tablet 0.4 mg  0.4 mg Sublingual Q5 Min PRN Ernestine Palomino NP   0.4 mg at 06/18/25 0717    [ - Suspended Admission] ondansetron disintegrating tablet 4 mg  4 mg Oral Q6H PRN Ernestine Palomino NP   4 mg at 06/17/25 2104    [ - Suspended Admission] oxyCODONE-acetaminophen 5-325 mg per tablet 1 tablet  1 tablet Oral Q4H PRN Ernestine Palomino NP   1 tablet at 06/17/25 0834    [ - Suspended Admission] pantoprazole EC tablet 40 mg  40 mg Oral Daily Yanet Padilla MD   40 mg at 06/17/25 0834    [ - Suspended Admission]  piperacillin-tazobactam 4.5 g in sodium chloride 0.9% 100 mL IVPB (ready to mix)  4.5 g Intravenous Q8H Ernestine Palomino NP 25 mL/hr at 06/18/25 0508 4.5 g at 06/18/25 0508    [ - Suspended Admission] polyethylene glycol packet 17 g  17 g Oral BID PRN Ernestine Palomino NP   17 g at 06/15/25 0853    [ - Suspended Admission] roflumilast (DALIRESP) tablet 500 mcg  500 mcg Oral Daily Ernestine Palomino NP   500 mcg at 06/17/25 0834    [COMPLETED] sodium chloride 0.9% bolus 500 mL 500 mL  500 mL Intravenous Once KEEGAN Turner MD   Stopped at 06/17/25 2116    [ - Suspended Admission] sodium chloride 0.9% flush 10 mL  10 mL Intravenous Q12H PRN Yanet Padilla MD        [ - Suspended Admission] sodium chloride 3% nebulizer solution 4 mL  4 mL Nebulization BID Yanet Padilla MD   4 mL at 06/17/25 2015    [ - Suspended Admission] tiotropium bromide 2.5 mcg/actuation inhaler 2 puff  2 puff Inhalation Daily Yanet Padilla MD   2 puff at 06/17/25 0822    [ - Suspended Admission] vancomycin - pharmacy to dose   Intravenous pharmacy to manage frequency Ernestine Palomino NP        [ - Suspended Admission] vancomycin 1 g in 0.9% sodium chloride 250 mL IVPB (ready to mix)  1,000 mg Intravenous Q12H Bernardino Guthrie MD   1,000 mg at 06/17/25 2107    [DISCONTINUED] diltiaZEM (CARDIZEM) 125 mg in 0.9% NaCl 125 mL infusion  5 mg/hr Intravenous Continuous Ernestine Palomino NP 5 mL/hr at 06/17/25 0841 5 mg/hr at 06/17/25 0841     Current Outpatient Medications on File Prior to Encounter   Medication Sig Dispense Refill    0.9% NaCl SolP 250 mL with vancomycin 1,000 mg SolR 1,000 mg Inject 1,000 mg into the vein every 12 (twelve) hours.      acetaminophen (TYLENOL) 500 MG tablet Take 500 mg by mouth 2 (two) times daily as needed for Pain.      amitriptyline (ELAVIL) 25 MG tablet Take 1 tablet (25 mg total) by mouth once daily. (Patient taking differently: Take 25 mg by mouth every evening.) 90 tablet 3    apixaban  (ELIQUIS) 5 mg Tab Take 1 tablet (5 mg total) by mouth 2 (two) times daily.      aspirin (ECOTRIN) 81 MG EC tablet Take 81 mg by mouth every morning.      atorvastatin (LIPITOR) 80 MG tablet Take 1 tablet (80 mg total) by mouth in the morning. 90 tablet 3    benzonatate (TESSALON) 100 MG capsule Take 1 capsule (100 mg total) by mouth 3 (three) times daily as needed for Cough.      BETA-CAROTENE,A, W-C & E/MIN (OCUVITE ORAL) Take 1 capsule by mouth once daily.       budesonide-glycopyr-formoterol (BREZTRI AEROSPHERE) 160-9-4.8 mcg/actuation HFAA Inhale 2 puffs into the lungs 2 (two) times a day. 10.7 g 3    D5W PgBk 100 mL with piperacillin-tazobactam 4.5 gram SolR 4.5 g Inject 4.5 g into the vein every 8 (eight) hours. for 7 days      dextrose 50% injection Inject 25 mLs (12.5 g total) into the vein as needed (between 50 - 70 mg/dL if patient cannnot take PO but has IV access.).      dextrose 50% injection Inject 50 mLs (25 g total) into the vein as needed (less than 50 mg/dL if patient cannnot take PO but has IV access.).      diltiaZEM (CARDIZEM CD) 180 MG 24 hr capsule Take 1 capsule (180 mg total) by mouth once daily.      echinacea 400 mg Cap Take 1 capsule by mouth once daily.       fluticasone propionate (FLONASE) 50 mcg/actuation nasal spray Spray 2 sprays (100 mcg total) in Each Nostril once daily. 16 g 11    glucose 4 GM chewable tablet Take 4 tablets (16 g total) by mouth as needed.      glucose 4 GM chewable tablet Take 6 tablets (24 g total) by mouth as needed.      guaiFENesin (MUCINEX) 600 mg 12 hr tablet Take 1 tablet (600 mg total) by mouth 2 (two) times daily. for 10 days      ipratropium (ATROVENT) 0.02 % nebulizer solution Take 2.5 mLs (500 mcg total) by nebulization every 6 (six) hours as needed for Wheezing. Rescue      latanoprost 0.005 % ophthalmic solution Instill 1 drop into both eyes every night at bedtime 7.5 mL 4    levETIRAcetam (KEPPRA) 500 MG Tab Take 1 tablet (500 mg total) by mouth  2 (two) times daily. 60 tablet 11    losartan (COZAAR) 100 MG tablet Take 1 tablet (100 mg total) by mouth once daily. 90 tablet 3    omeprazole (PRILOSEC) 20 MG capsule Take 20 mg by mouth once daily.      polyethylene glycol (GLYCOLAX) 17 gram/dose powder Take 17 grams by mouth every day for constipation prevention 238 g 0    roflumilast (DALIRESP) 500 mcg Tab Take 500 mcg by mouth every morning.      traZODone (DESYREL) 50 MG tablet Take 1 tablet (50 mg total) by mouth every evening. 30 tablet 5   [2]   Past Medical History:  Diagnosis Date    Benign neoplasm of colon 10/01/2013    Bronchitis chronic     CAD (coronary artery disease) 10/31/2013    Cataract 2008    COPD (chronic obstructive pulmonary disease)     Emphysema of lung     Encounter for preventive health examination 03/21/2018    GERD (gastroesophageal reflux disease)     Glaucoma 2010    Hearing loss 2015    Heart attack 2013    Hx of colonic polyps     Hypertension     NAFLD (nonalcoholic fatty liver disease) 03/27/2017    SHOLA on CPAP     RLS (restless legs syndrome)     Screen for colon cancer 08/30/2013   [3]   Past Surgical History:  Procedure Laterality Date    bilateral foot surgery  1993    CARDIAC CATHETERIZATION  2013    x1    CATARACT EXTRACTION, BILATERAL      COLON SURGERY  2013    Ace Mott MD    COLONOSCOPY N/A 03/21/2018    Procedure: COLONOSCOPY;  Surgeon: Terry Mott MD;  Location: [address]);  Service: Endoscopy;  Laterality: N/A;    COLONOSCOPY N/A 04/12/2019    Procedure: COLONOSCOPY;  Surgeon: John Mantilla MD;  Location: [address]);  Service: Endoscopy;  Laterality: N/A;    COLONOSCOPY N/A 05/31/2022    Procedure: COLONOSCOPY;  Surgeon: MEDINA Farris MD;  Location: [address]);  Service: Endoscopy;  Laterality: N/A;  fully vacc-inst portal-tb    ENDOBRONCHIAL ULTRASOUND Bilateral 04/30/2024    Procedure: ENDOBRONCHIAL ULTRASOUND (EBUS);  Surgeon: Naveed Aguero MD;  Location: [facility];   Service: Pulmonary;  Laterality: Bilateral;    EYE SURGERY      scrotal abscess removal      TYMPANOSTOMY TUBE PLACEMENT  2017   [4]   Social History  Tobacco Use    Smoking status: Former     Current packs/day: 0.00     Average packs/day: 1 pack/day for 40.0 years (40.0 ttl pk-yrs)     Types: Cigarettes     Start date: 8/15/1972     Quit date: 8/15/2012     Years since quittin.8     Passive exposure: Never    Smokeless tobacco: Never   Substance Use Topics    Alcohol use: Yes     Alcohol/week: 3.0 standard drinks of alcohol     Types: 3 Cans of beer per week     Comment: maybe 2-3  beers weekly    Drug use: No        Kenny Cool MD  25 0949

## 2025-06-18 NOTE — ASSESSMENT & PLAN NOTE
Patient with a history of COPD On Symbicort Spiriva and Daliresp at home.  Follows up with pulmonology outpatient.   Presents with continued dyspnea, use of accessory muscles for breathing, and worsening of baseline hypoxia currently.  Patient is currently on COPD Pathway. Continue scheduled inhalers Steroids, Antibiotics, and Supplemental oxygen and monitor respiratory status closely.

## 2025-06-18 NOTE — EICU
"Virtual ICU Admission    Admit Date: 2025  LOS: 0  Code Status: DNR   : 1945  Bed: 7080/7080 A:     Diagnosis: Acute on chronic respiratory failure with hypoxia    Patient  has a past medical history of Benign neoplasm of colon, Bronchitis chronic, CAD (coronary artery disease), Cataract, COPD (chronic obstructive pulmonary disease), Emphysema of lung, Encounter for preventive health examination, GERD (gastroesophageal reflux disease), Glaucoma, Hearing loss, Heart attack, colonic polyps, Hypertension, NAFLD (nonalcoholic fatty liver disease), SHOLA on CPAP, RLS (restless legs syndrome), and Screen for colon cancer.    Last VS: /64   Pulse 69   Temp 97.2 °F (36.2 °C) (Axillary)   Resp (!) 21   Ht 5' 9" (1.753 m)   Wt 90.7 kg (200 lb)   SpO2 98%   BMI 29.53 kg/m²       VICU Review    VICU nurse assessment :  [place] completed, LDA documentation reconciliation completed, and VTE prophylaxis review                 "

## 2025-06-19 LAB
ABSOLUTE EOSINOPHIL (OHS): 0 K/UL
ABSOLUTE MONOCYTE (OHS): 0.29 K/UL (ref 0.3–1)
ABSOLUTE NEUTROPHIL COUNT (OHS): 8.22 K/UL (ref 1.8–7.7)
ALBUMIN SERPL BCP-MCNC: 1.6 G/DL (ref 3.5–5.2)
ALBUMIN SERPL BCP-MCNC: 1.7 G/DL (ref 3.5–5.2)
ALP SERPL-CCNC: 94 UNIT/L (ref 40–150)
ALP SERPL-CCNC: 96 UNIT/L (ref 40–150)
ALT SERPL W/O P-5'-P-CCNC: 44 UNIT/L (ref 10–44)
ALT SERPL W/O P-5'-P-CCNC: 48 UNIT/L (ref 10–44)
ANION GAP (OHS): 15 MMOL/L (ref 8–16)
ANION GAP (OHS): 8 MMOL/L (ref 8–16)
APTT PPP: 32.6 SECONDS (ref 21–32)
APTT PPP: 52.8 SECONDS (ref 21–32)
APTT PPP: 61.5 SECONDS (ref 21–32)
AST SERPL-CCNC: 18 UNIT/L (ref 11–45)
AST SERPL-CCNC: 32 UNIT/L (ref 11–45)
BASOPHILS # BLD AUTO: 0.01 K/UL
BASOPHILS NFR BLD AUTO: 0.1 %
BILIRUB SERPL-MCNC: 0.4 MG/DL (ref 0.1–1)
BILIRUB SERPL-MCNC: 0.6 MG/DL (ref 0.1–1)
BUN SERPL-MCNC: 19 MG/DL (ref 8–23)
BUN SERPL-MCNC: 26 MG/DL (ref 8–23)
CALCIUM SERPL-MCNC: 8.3 MG/DL (ref 8.7–10.5)
CALCIUM SERPL-MCNC: 8.5 MG/DL (ref 8.7–10.5)
CHLORIDE SERPL-SCNC: 102 MMOL/L (ref 95–110)
CHLORIDE SERPL-SCNC: 103 MMOL/L (ref 95–110)
CO2 SERPL-SCNC: 19 MMOL/L (ref 23–29)
CO2 SERPL-SCNC: 26 MMOL/L (ref 23–29)
CREAT SERPL-MCNC: 0.6 MG/DL (ref 0.5–1.4)
CREAT SERPL-MCNC: 0.9 MG/DL (ref 0.5–1.4)
ERYTHROCYTE [DISTWIDTH] IN BLOOD BY AUTOMATED COUNT: 16.6 % (ref 11.5–14.5)
GFR SERPLBLD CREATININE-BSD FMLA CKD-EPI: >60 ML/MIN/1.73/M2
GFR SERPLBLD CREATININE-BSD FMLA CKD-EPI: >60 ML/MIN/1.73/M2
GLUCOSE SERPL-MCNC: 115 MG/DL (ref 70–110)
GLUCOSE SERPL-MCNC: 156 MG/DL (ref 70–110)
HCT VFR BLD AUTO: 27.4 % (ref 40–54)
HGB BLD-MCNC: 8.8 GM/DL (ref 14–18)
IMM GRANULOCYTES # BLD AUTO: 0.17 K/UL (ref 0–0.04)
IMM GRANULOCYTES NFR BLD AUTO: 1.9 % (ref 0–0.5)
LYMPHOCYTES # BLD AUTO: 0.17 K/UL (ref 1–4.8)
MAGNESIUM SERPL-MCNC: 1.6 MG/DL (ref 1.6–2.6)
MAGNESIUM SERPL-MCNC: 1.9 MG/DL (ref 1.6–2.6)
MCH RBC QN AUTO: 28.3 PG (ref 27–31)
MCHC RBC AUTO-ENTMCNC: 32.1 G/DL (ref 32–36)
MCV RBC AUTO: 88 FL (ref 82–98)
NUCLEATED RBC (/100WBC) (OHS): 0 /100 WBC
PHOSPHATE SERPL-MCNC: 1.8 MG/DL (ref 2.7–4.5)
PHOSPHATE SERPL-MCNC: 3.3 MG/DL (ref 2.7–4.5)
PLATELET # BLD AUTO: 157 K/UL (ref 150–450)
PMV BLD AUTO: 10.2 FL (ref 9.2–12.9)
POTASSIUM SERPL-SCNC: 3.5 MMOL/L (ref 3.5–5.1)
POTASSIUM SERPL-SCNC: 3.9 MMOL/L (ref 3.5–5.1)
PROT SERPL-MCNC: 4.7 GM/DL (ref 6–8.4)
PROT SERPL-MCNC: 5 GM/DL (ref 6–8.4)
RBC # BLD AUTO: 3.11 M/UL (ref 4.6–6.2)
RELATIVE EOSINOPHIL (OHS): 0 %
RELATIVE LYMPHOCYTE (OHS): 1.9 % (ref 18–48)
RELATIVE MONOCYTE (OHS): 3.3 % (ref 4–15)
RELATIVE NEUTROPHIL (OHS): 92.8 % (ref 38–73)
SODIUM SERPL-SCNC: 136 MMOL/L (ref 136–145)
SODIUM SERPL-SCNC: 137 MMOL/L (ref 136–145)
WBC # BLD AUTO: 8.86 K/UL (ref 3.9–12.7)

## 2025-06-19 PROCEDURE — 99900031 HC PATIENT EDUCATION (STAT)

## 2025-06-19 PROCEDURE — 85025 COMPLETE CBC W/AUTO DIFF WBC: CPT

## 2025-06-19 PROCEDURE — 18500000 HC LEAVE OF ABSENCE HOSPITAL SERVICES

## 2025-06-19 PROCEDURE — 99900035 HC TECH TIME PER 15 MIN (STAT)

## 2025-06-19 PROCEDURE — 63600175 PHARM REV CODE 636 W HCPCS

## 2025-06-19 PROCEDURE — 25000242 PHARM REV CODE 250 ALT 637 W/ HCPCS: Performed by: STUDENT IN AN ORGANIZED HEALTH CARE EDUCATION/TRAINING PROGRAM

## 2025-06-19 PROCEDURE — 25000003 PHARM REV CODE 250

## 2025-06-19 PROCEDURE — 94640 AIRWAY INHALATION TREATMENT: CPT

## 2025-06-19 PROCEDURE — 63600175 PHARM REV CODE 636 W HCPCS: Performed by: STUDENT IN AN ORGANIZED HEALTH CARE EDUCATION/TRAINING PROGRAM

## 2025-06-19 PROCEDURE — 63600175 PHARM REV CODE 636 W HCPCS: Mod: JZ,TB

## 2025-06-19 PROCEDURE — 85730 THROMBOPLASTIN TIME PARTIAL: CPT | Performed by: EMERGENCY MEDICINE

## 2025-06-19 PROCEDURE — 20000000 HC ICU ROOM

## 2025-06-19 PROCEDURE — 93010 ELECTROCARDIOGRAM REPORT: CPT | Mod: ,,, | Performed by: STUDENT IN AN ORGANIZED HEALTH CARE EDUCATION/TRAINING PROGRAM

## 2025-06-19 PROCEDURE — 99291 CRITICAL CARE FIRST HOUR: CPT | Mod: GC,,, | Performed by: EMERGENCY MEDICINE

## 2025-06-19 PROCEDURE — 84100 ASSAY OF PHOSPHORUS: CPT | Performed by: EMERGENCY MEDICINE

## 2025-06-19 PROCEDURE — 80053 COMPREHEN METABOLIC PANEL: CPT | Performed by: EMERGENCY MEDICINE

## 2025-06-19 PROCEDURE — 83735 ASSAY OF MAGNESIUM: CPT | Performed by: STUDENT IN AN ORGANIZED HEALTH CARE EDUCATION/TRAINING PROGRAM

## 2025-06-19 PROCEDURE — 25000003 PHARM REV CODE 250: Performed by: EMERGENCY MEDICINE

## 2025-06-19 PROCEDURE — 85730 THROMBOPLASTIN TIME PARTIAL: CPT

## 2025-06-19 PROCEDURE — 94660 CPAP INITIATION&MGMT: CPT

## 2025-06-19 PROCEDURE — 93005 ELECTROCARDIOGRAM TRACING: CPT | Performed by: STUDENT IN AN ORGANIZED HEALTH CARE EDUCATION/TRAINING PROGRAM

## 2025-06-19 PROCEDURE — 25000242 PHARM REV CODE 250 ALT 637 W/ HCPCS

## 2025-06-19 PROCEDURE — 94761 N-INVAS EAR/PLS OXIMETRY MLT: CPT

## 2025-06-19 PROCEDURE — 25000003 PHARM REV CODE 250: Performed by: STUDENT IN AN ORGANIZED HEALTH CARE EDUCATION/TRAINING PROGRAM

## 2025-06-19 PROCEDURE — 27100171 HC OXYGEN HIGH FLOW UP TO 24 HOURS

## 2025-06-19 RX ORDER — LEVETIRACETAM 500 MG/1
500 TABLET ORAL 2 TIMES DAILY
Status: DISCONTINUED | OUTPATIENT
Start: 2025-06-19 | End: 2025-06-22 | Stop reason: HOSPADM

## 2025-06-19 RX ORDER — PANTOPRAZOLE SODIUM 40 MG/1
40 TABLET, DELAYED RELEASE ORAL DAILY
Status: DISCONTINUED | OUTPATIENT
Start: 2025-06-20 | End: 2025-06-22 | Stop reason: HOSPADM

## 2025-06-19 RX ADMIN — CEFEPIME 2 G: 2 INJECTION, POWDER, FOR SOLUTION INTRAVENOUS at 11:06

## 2025-06-19 RX ADMIN — LEVALBUTEROL 1.25 MG: 1.25 SOLUTION, CONCENTRATE RESPIRATORY (INHALATION) at 07:06

## 2025-06-19 RX ADMIN — AMIODARONE HYDROCHLORIDE 1 MG/MIN: 1.8 INJECTION, SOLUTION INTRAVENOUS at 08:06

## 2025-06-19 RX ADMIN — HEPARIN SODIUM AND DEXTROSE 12 UNITS/KG/HR: 10000; 5 INJECTION INTRAVENOUS at 12:06

## 2025-06-19 RX ADMIN — IPRATROPIUM BROMIDE 0.5 MG: 0.5 SOLUTION RESPIRATORY (INHALATION) at 03:06

## 2025-06-19 RX ADMIN — LEVETIRACETAM 500 MG: 500 INJECTION, SOLUTION, CONCENTRATE INTRAVENOUS at 08:06

## 2025-06-19 RX ADMIN — AMIODARONE HYDROCHLORIDE 0.5 MG/MIN: 1.8 INJECTION, SOLUTION INTRAVENOUS at 02:06

## 2025-06-19 RX ADMIN — CEFEPIME 2 G: 2 INJECTION, POWDER, FOR SOLUTION INTRAVENOUS at 06:06

## 2025-06-19 RX ADMIN — MUPIROCIN: 20 OINTMENT TOPICAL at 10:06

## 2025-06-19 RX ADMIN — LEVOFLOXACIN 750 MG: 5 INJECTION, SOLUTION INTRAVENOUS at 11:06

## 2025-06-19 RX ADMIN — MUPIROCIN: 20 OINTMENT TOPICAL at 08:06

## 2025-06-19 RX ADMIN — LEVALBUTEROL 1.25 MG: 1.25 SOLUTION, CONCENTRATE RESPIRATORY (INHALATION) at 12:06

## 2025-06-19 RX ADMIN — CEFEPIME 2 G: 2 INJECTION, POWDER, FOR SOLUTION INTRAVENOUS at 03:06

## 2025-06-19 RX ADMIN — AMIODARONE HYDROCHLORIDE 150 MG: 1.5 INJECTION, SOLUTION INTRAVENOUS at 08:06

## 2025-06-19 RX ADMIN — METHYLPREDNISOLONE SODIUM SUCCINATE 40 MG: 40 INJECTION, POWDER, FOR SOLUTION INTRAMUSCULAR; INTRAVENOUS at 08:06

## 2025-06-19 RX ADMIN — AMIODARONE HYDROCHLORIDE 0.5 MG/MIN: 1.8 INJECTION, SOLUTION INTRAVENOUS at 10:06

## 2025-06-19 RX ADMIN — LEVALBUTEROL 1.25 MG: 1.25 SOLUTION, CONCENTRATE RESPIRATORY (INHALATION) at 04:06

## 2025-06-19 RX ADMIN — LEVALBUTEROL 1.25 MG: 1.25 SOLUTION, CONCENTRATE RESPIRATORY (INHALATION) at 03:06

## 2025-06-19 RX ADMIN — LEVALBUTEROL 1.25 MG: 1.25 SOLUTION, CONCENTRATE RESPIRATORY (INHALATION) at 11:06

## 2025-06-19 RX ADMIN — IPRATROPIUM BROMIDE 0.5 MG: 0.5 SOLUTION RESPIRATORY (INHALATION) at 12:06

## 2025-06-19 RX ADMIN — IPRATROPIUM BROMIDE 0.5 MG: 0.5 SOLUTION RESPIRATORY (INHALATION) at 07:06

## 2025-06-19 RX ADMIN — IPRATROPIUM BROMIDE 0.5 MG: 0.5 SOLUTION RESPIRATORY (INHALATION) at 11:06

## 2025-06-19 RX ADMIN — VANCOMYCIN HYDROCHLORIDE 1250 MG: 1.25 INJECTION, POWDER, LYOPHILIZED, FOR SOLUTION INTRAVENOUS at 12:06

## 2025-06-19 RX ADMIN — PANTOPRAZOLE SODIUM 40 MG: 40 INJECTION, POWDER, LYOPHILIZED, FOR SOLUTION INTRAVENOUS at 08:06

## 2025-06-19 RX ADMIN — LEVETIRACETAM 500 MG: 500 TABLET, FILM COATED ORAL at 09:06

## 2025-06-19 RX ADMIN — HEPARIN SODIUM AND DEXTROSE 12 UNITS/KG/HR: 10000; 5 INJECTION INTRAVENOUS at 10:06

## 2025-06-19 RX ADMIN — IPRATROPIUM BROMIDE 0.5 MG: 0.5 SOLUTION RESPIRATORY (INHALATION) at 04:06

## 2025-06-19 NOTE — EICU
Intervention Initiated From:  COR / LILLIANAU    Ramon intervened regarding:  Rounding (Video assessment)    VICU Night Rounds Checklist  24H Vital Sign Range:  Temp:  [96.7 °F (35.9 °C)-98.3 °F (36.8 °C)]   Pulse:  []   Resp:  [16-42]   BP: ()/()   SpO2:  [85 %-99 %]     Video rounds and LDA reconciliation

## 2025-06-19 NOTE — ASSESSMENT & PLAN NOTE
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 In the ED, he was AF. 170s bpm. Labs were notable for leukocytosis 17.04 with left shift. Hgb 11.7. K 3.3. Phosphorus 1.8. BNP and Trop wnl. Lactate normal. VBG 7.471/38.7/32.9/27.7. He was started on 15L non breather and transitioned to BiPAP and weaned to HFNC 60L but BiPAP was restarted with concern for increased WOB despite VBG showing some improvement and O2 sats in the 90s. HR had minimal response to IV Diltiazem. CXR with similar perihilar and lower lobe infiltrates within both lungs. CT chest w/o contrast with interval development of bilateral small pleural effusion and R and L ground glass opacities concerning for progression of infectious processes.    Patient with Hypoxic Respiratory failure which is Acute on chronic.  he is on home oxygen at 4 LPM. Supplemental oxygen was provided and noted- Oxygen Concentration (%):  [40-50] 40    .   Signs/symptoms of respiratory failure include- tachypnea, increased work of breathing, respiratory distress, and use of accessory muscles. Contributing possible diagnoses includes - COPD, Pleural effusion, and severe Pneumonia Labs and images were reviewed. Patient Has recent ABG, which has been reviewed. Will treat underlying causes and adjust management of respiratory failure as follows-     - F/u Bcx  NGTD   - Resp cx pending   - BNP 62, Lac of 1.2  - RIP negative   - Procal .71     - CTA chest with ill-defined airspace opacities in the right upper and middle lobes when compared to previous on 5/22/25, which could indicate  aspiration, Pna.   - Continue levalbuterol and home inhaler   - Continue abx with cefepime, and Levoquin and de-escalate as clinically appropriate. Vanc stopped on 6/19   - Continue Bipap qhs and continue to monitor respiratory status and repeat  abg as needed. Transitioned to NC on 6/19

## 2025-06-19 NOTE — ASSESSMENT & PLAN NOTE
Patient with mix of irregular and regular SVT concerning for MAT vs afib w rvr cannot be certain based on ECG and telemetry. This has occurred in the setting of acute on chronic hypoxic respiratory failure in the setting of pneumonia vs COPD exacerbation. He has a recent normal TTE and an allergy to metoprolol. Has a history of documented suspected Aflutter back on June 9-11 which he converted to sinus rhythm with PACs after IV diltiazem and started on PO Dilt at home.      Recommendations:  - continue telemetry  - daily ECG as needed   - K > 4, Mg > 2  - Received IV Dilt in the ED   - recommend AC given CHADSVASc of 4 , continue home eliquis   - Consider starting Amiodarone if patient rate remains uncontrolled since he became hypotensive with Dilt   - Amio gtt started on 6/19 due to persistent uncontrolled rate

## 2025-06-19 NOTE — ACP (ADVANCE CARE PLANNING)
Advance Care Planning     Date: 06/19/2025    Code Status  In light of the patients advanced and life limiting illness,I engaged the the patient in a voluntary conversation about the patient's preferences for care  at the very end of life. The patient wishes to have a natural, peaceful death.  Along those lines, the patient does not wish to have CPR or intubation or  other invasive treatments performed when his heart and/or breathing stops. I communicated to the patient that a DNR order would be placed in his medical record to reflect this preference.    A total of 20 min was spent on advance care planning, goals of care discussion, emotional support, formulating and communicating prognosis and exploring burden/benefit of various approaches of treatment. This discussion occurred on a fully voluntary basis with the verbal consent of the patient and/or family.

## 2025-06-19 NOTE — PLAN OF CARE
Recommendations  When medically able, initiate Heart Healthy diet  Nursing, continue documenting PO intake via flowsheets  If alternative means of nutrition warranted, consult RD  Monitor labs, meds, weight, skin    Goals: meet % een/epn by next RD f/u  Nutrition Goal Status: new  Communication of RD Recs: other (comment) (poc)

## 2025-06-19 NOTE — PROGRESS NOTES
"[facility]  Critical Care Medicine  Progress Note    Patient Name: Jordin Allen Jr.  MRN: [id number]  Admission Date: 6/18/2025  Hospital Length of Stay: 1 days  Code Status: DNR  Attending Provider: Theo Lucia MD  Primary Care Provider: Sierra Landry MD   Principal Problem: Acute on chronic respiratory failure with hypoxia    Subjective:     HPI:  Jordin Allen Jr (Ray) is a 79 year old male with a PMHx COPD on 4L baseline, SHOLA, HTN, CAD, left lung adenocarcinoma with brain mets s/p chemo/RT/immuno (12/24), GERD, HLD presented to [facility] from LTAC with worsening shortness of breath since recent discharge from [facility]. He was admitted to Hospital Medicine for acute hypoxic respiratory failure however shortly after he was admitted to MICU for increasing work of breathing required BiPAP and A fib w/ RVR not responsive to IV Diltiazem.    He was recently admitted to [facility] for similar presentation and was treated for parainfluenza pneumonia and COPD exacerbation. On route, he became hypotensive and was electrically cardioverted, with only transient slowing of rate. Notable for multiple admissions (3) in the last month for similar presentations.     In the ED, he was AF. 170s bpm. Labs were notable for leukocytosis 17.04 with left shift. Hgb 11.7. K 3.3. Phosphorus 1.8. BNP and Trop wnl. Lactate normal. VBG 7.471/38.7/32.9/27.7. He was started on 15L non breather and transitioned to BiPAP and weaned to HFNC 60L but BiPAP was restarted with concern for increased WOB despite VBG showing some improvement and O2 sats in the 90s. HR had minimal response to IV Diltiazem. CXR with similar perihilar and lower lobe infiltrates within both lungs. CT chest w/o contrast with interval development of bilateral small pleural effusion and R and L ground glass opacities concerning for progression of infectious processes. Pulmonology was consulted for recurrent COPD. He was started on Cefepime, Vancomycin, Levaquin and " IV Solumedrol for PNA and COPD exacerbation.     Admitted to MICU for acute respiratory failure and Afib w/RVR.     History obtained through chart review and patient.        Hospital/ICU Course:  Admitted to MICU for ARF and Afib w RVR. Received IV dilt and was started on Bipap on 6/18. Became hypotensive with Dilt and was started on  Amio gtt on 6/19 to HR in the 150's. Transitioned from Bipapr to NC 3L on 6/19. Started on Vanc, Cefepime, and Levoquin and Steroids.  Vanc was stopped on 6/19. RIP negative and bcx NGTD. CT imaging notable for basilar airspace opacities w concerns for aspiration/PNA.       Past Medical History:   Diagnosis Date    Benign neoplasm of colon 10/01/2013    Bronchitis chronic     CAD (coronary artery disease) 10/31/2013    Cataract 2008    COPD (chronic obstructive pulmonary disease)     Emphysema of lung     Encounter for preventive health examination 03/21/2018    GERD (gastroesophageal reflux disease)     Glaucoma 2010    Hearing loss 2015    Heart attack 2013    Hx of colonic polyps     Hypertension     NAFLD (nonalcoholic fatty liver disease) 03/27/2017    SHOLA on CPAP     RLS (restless legs syndrome)     Screen for colon cancer 08/30/2013       Past Surgical History:   Procedure Laterality Date    bilateral foot surgery  1993    CARDIAC CATHETERIZATION  2013    x1    CATARACT EXTRACTION, BILATERAL      COLON SURGERY  2013    Ace Mott MD    COLONOSCOPY N/A 03/21/2018    Procedure: COLONOSCOPY;  Surgeon: Terry Mott MD;  Location: [address]);  Service: Endoscopy;  Laterality: N/A;    COLONOSCOPY N/A 04/12/2019    Procedure: COLONOSCOPY;  Surgeon: John Mantilla MD;  [address]  Service: Endoscopy;  Laterality: N/A;    COLONOSCOPY N/A 05/31/2022    Procedure: COLONOSCOPY;  Surgeon: MEDINA Farris MD;  Location: [address]);  Service: Endoscopy;  Laterality: N/A;  fully vacc-inst portal-tb    ENDOBRONCHIAL ULTRASOUND Bilateral 04/30/2024     Procedure: ENDOBRONCHIAL ULTRASOUND (EBUS);  Surgeon: Naveed Aguero MD;  Location: [facility];  Service: Pulmonary;  Laterality: Bilateral;    EYE SURGERY      scrotal abscess removal      TYMPANOSTOMY TUBE PLACEMENT         Review of patient's allergies indicates:   Allergen Reactions    Brilinta [ticagrelor] Itching    Lisinopril      Other reaction(s): cough    Metoprolol succinate Rash       Family History       Problem Relation (Age of Onset)    Diabetes Maternal Grandmother    Heart attack Mother    Heart disease Mother    Hypertension Mother    No Known Problems Sister, Daughter          Tobacco Use    Smoking status: Former     Current packs/day: 0.00     Average packs/day: 1 pack/day for 40.0 years (40.0 ttl pk-yrs)     Types: Cigarettes     Start date: 8/15/1972     Quit date: 8/15/2012     Years since quittin.8     Passive exposure: Never    Smokeless tobacco: Never   Substance and Sexual Activity    Alcohol use: Yes     Alcohol/week: 3.0 standard drinks of alcohol     Types: 3 Cans of beer per week     Comment: maybe 2-3  beers weekly    Drug use: No    Sexual activity: Yes     Partners: Female      Review of Systems   Constitutional:  Negative for chills and fever.   Respiratory:  Positive for shortness of breath.    Cardiovascular:  Negative for chest pain and leg swelling.   Gastrointestinal:  Negative for diarrhea, nausea and vomiting.   Genitourinary:  Negative for dysuria.   Neurological:  Negative for syncope.     Objective:     Interval History: HR in the 150's this am, w BP in the low 100's/60's patient was started on amio gtt and HR went down to the 90's and remain HDS.  patient was transition off Bipap this am to NC.     Vital Signs (Most Recent):  Temp: 97.2 °F (36.2 °C) (25 1400)  Pulse: 74 (25 1400)  Resp: (!) 25 (25 1400)  BP: (!) 100/59 (25 1400)  SpO2: 95 % (25 1400) Vital Signs (24h Range):  Temp:  [97.2 °F (36.2 °C)-98.3 °F (36.8 °C)] 97.2  °F (36.2 °C)  Pulse:  [] 74  Resp:  [16-42] 25  SpO2:  [85 %-100 %] 95 %  BP: ()/(51-77) 100/59   Weight: 90.7 kg (200 lb)  Body mass index is 29.53 kg/m².      Intake/Output Summary (Last 24 hours) at 6/18/2025 1439  Last data filed at 6/18/2025 0902  Gross per 24 hour   Intake 1000 ml   Output 1850 ml   Net -850 ml          Physical Exam  Vitals and nursing note reviewed.   Constitutional:       General: He is not in acute distress.     Appearance: He is ill-appearing. He is not toxic-appearing.   HENT:      Head: Normocephalic and atraumatic.   Eyes:      General: No scleral icterus.        Right eye: No discharge.         Left eye: No discharge.      Extraocular Movements: Extraocular movements intact.      Conjunctiva/sclera: Conjunctivae normal.      Pupils: Pupils are equal, round, and reactive to light.   Cardiovascular:      Rate and Rhythm: Tachycardia present. Rhythm irregular.      Pulses: Normal pulses.      Heart sounds: Normal heart sounds. No murmur heard.  Pulmonary:      Effort: Respiratory distress present.      Breath sounds: No stridor. Rhonchi present. No wheezing or rales.      Comments: On BiPAP  Chest:      Chest wall: No tenderness.   Abdominal:      General: Abdomen is flat. There is no distension.      Palpations: Abdomen is soft. There is no mass.      Tenderness: There is no abdominal tenderness.      Hernia: No hernia is present.   Musculoskeletal:      Right lower leg: No edema.      Left lower leg: No edema.   Skin:     General: Skin is warm.      Coloration: Skin is not jaundiced or pale.      Findings: No erythema or rash.   Neurological:      Mental Status: He is alert and oriented to person, place, and time.   Psychiatric:         Mood and Affect: Mood normal.         Behavior: Behavior normal.            Vents:  Oxygen Concentration (%): 50 (06/18/25 1400)  Lines/Drains/Airways       Peripheral Intravenous Line  Duration                  Midline Catheter - Single  Lumen 06/14/25 1245 Left basilic vein (medial side of arm) other (see comments) 4 days         Peripheral IV - Single Lumen 06/16/25 1033 22 G 1 1/4 in Anterior;Right Wrist 2 days         Peripheral IV - Single Lumen 06/18/25 0755 18 G Right Antecubital <1 day                  Significant Labs:    CBC/Anemia Profile:  Recent Labs   Lab 06/18/25  0514 06/18/25  0754 06/18/25  0759 06/18/25  0948   WBC 10.74 17.04*  --   --    HGB 11.7* 11.7*  --   --    HCT 36.1* 35.6* 40.2  --    PLT 98* 240  --   --    MCV 87 88  --   --    RDW 16.7* 17.0*  --   --    IRON  --   --   --  21*   FERRITIN  --   --   --  2,096.0*        Chemistries:  Recent Labs   Lab 06/18/25  0514 06/18/25  0754    138   K 3.5 3.3*    100   CO2 22* 27   BUN 18 19   CREATININE 0.7 0.7   CALCIUM 8.5* 9.0   ALBUMIN 1.8* 2.0*   PROT 5.1* 5.8*   BILITOT 0.6 0.8   ALKPHOS 103 120   ALT 51* 52*   AST 25 20   MG 1.9 1.9   PHOS 2.4* 1.8*           Significant Imaging: I have reviewed all pertinent imaging results/findings within the past 24 hours.    ABG  Recent Labs   Lab 06/18/25  1339   PH 7.471*   PO2 32.9*   PCO2 38.7   HCO3 27.7     Assessment/Plan:     Psychiatric  Anxiety  - was started on Precedex for a short period while in the ED due to anxiety, restart home meds when appropriate       Pulmonary  * Acute on chronic respiratory failure with hypoxia  79 year old male with a PMHx COPD on 4L baseline, SHOLA, HTN, CAD, left lung adenocarcinoma with brain mets s/p chemo/RT/immuno (12/24), GERD, HLD presented to [facility] from [place]AC with worsening shortness of breath. In the ED, he was AF. 170s bpm. Labs were notable for leukocytosis 17.04 with left shift. Hgb 11.7. K 3.3. Phosphorus 1.8. BNP and Trop wnl. Lactate normal. VBG 7.471/38.7/32.9/27.7. He was started on 15L non breather and transitioned to BiPAP and weaned to HFNC 60L but BiPAP was restarted with concern for increased WOB despite VBG showing some improvement and O2 sats in the 90s. HR had  minimal response to IV Diltiazem. CXR with similar perihilar and lower lobe infiltrates within both lungs. CT chest w/o contrast with interval development of bilateral small pleural effusion and R and L ground glass opacities concerning for progression of infectious processes.    Patient with Hypoxic Respiratory failure which is Acute on chronic.  he is on home oxygen at 4 LPM. Supplemental oxygen was provided and noted- Oxygen Concentration (%):  [40-50] 40    .   Signs/symptoms of respiratory failure include- tachypnea, increased work of breathing, respiratory distress, and use of accessory muscles. Contributing possible diagnoses includes - COPD, Pleural effusion, and severe Pneumonia Labs and images were reviewed. Patient Has recent ABG, which has been reviewed. Will treat underlying causes and adjust management of respiratory failure as follows-     - F/u Bcx  NGTD   - Resp cx pending   - BNP 62, Lac of 1.2  - RIP negative   - Procal .71     - CTA chest with ill-defined airspace opacities in the right upper and middle lobes when compared to previous on 5/22/25, which could indicate  aspiration, Pna.   - Continue levalbuterol and home inhaler   - Continue abx with cefepime, and Levoquin and de-escalate as clinically appropriate. Vanc stopped on 6/19   - Continue Bipap qhs and continue to monitor respiratory status and repeat abg as needed. Transitioned to NC on 6/19     COPD with acute exacerbation    Patient with a history of COPD On Symbicort Spiriva and Daliresp at home.  Follows up with pulmonology outpatient.   Presents with continued dyspnea, use of accessory muscles for breathing, and worsening of baseline hypoxia currently.  Patient is currently on COPD Pathway. Continue scheduled inhalers Steroids, Antibiotics, and Supplemental oxygen and monitor respiratory status closely.   - Continue home inhalers   - Continue Levabuterol while inpatient, given that albuterol can cause tachycardia            Cardiac/Vascular  Dyslipidemia  Continue Atorvastatin     CAD (coronary artery disease)  Patient with known history of  CAD s/p stent placement and CABG, which is controlled Will continue Statin and monitor for S/Sx of angina/ACS. Continue to monitor on telemetry.     Atrial fibrillation with RVR  Patient with mix of irregular and regular SVT concerning for MAT vs afib w rvr cannot be certain based on ECG and telemetry. This has occurred in the setting of acute on chronic hypoxic respiratory failure in the setting of pneumonia vs COPD exacerbation. He has a recent normal TTE and an allergy to metoprolol. Has a history of documented suspected Aflutter back on June 9-11 which he converted to sinus rhythm with PACs after IV diltiazem and started on PO Dilt at home.      Recommendations:  - continue telemetry  - daily ECG as needed   - K > 4, Mg > 2  - Received IV Dilt in the ED   - recommend AC given CHADSVASc of 4 , continue home eliquis   - Consider starting Amiodarone if patient rate remains uncontrolled since he became hypotensive with Dilt   - Amio gtt started on 6/19 due to persistent uncontrolled rate            Oncology  Metastasis to brain   IV NSCLC (cT3 cN1 M1b) adenocarcinoma  diagnosed on EBUS 4/30/24 s/p definitive chemo-RT to 60 Gy in 30 fx completed 6/18/24 with Dr. Deng. Staging MRI Brain 4/3/24 demonstrated a 0.8 cm Left thalamic metastasis; this was observed. MRI Brain 7/26/24 demonstrated interval increase in size to 2.2 cm; no other evidence of intracranial disease. He completed GK SRT 27 Gy in 3 fx on 8/7/24. MRI Brain 10/7/24 demonstrated new 1.9 cm Right medial temporo-occipital metastasis. He completed GK SRS 20 Gy x1 to Rt temporal metastasis 10/11/24. MRI Brain 1/24/25 demonstrated a 0.4 cm posterior Right temporal lobe met. This was treated 22 Gy x1 on 1/31/25.      MRI Brain 3/28/25 with improved edema in Right temporal lobe and stable treated enhancing lesion in the Right  temporal lobe. No evidence of new/recurrent intracranial disease.   - recently on dexamethasone taper, but stopped taking on 05/12  - following with OP neuro onc    Adenocarcinoma, lung, left  Stage IV NSCLC (cT3 cN1 M1b) adenocarcinoma Completed treatment with chemo/XRT. Brain XRT for lesionsx2. off immunotherapy since 12/24. Follows with Dr. Ponce outpatient       GI  GERD (gastroesophageal reflux disease)  - Continue PPI          Critical Care Daily Checklist:    A: Awake: RASS Goal/Actual Goal:    Actual:     B: Spontaneous Breathing Trial Performed?     C: SAT & SBT Coordinated?  NA                       D: Delirium: CAM-ICU     E: Early Mobility Performed? Yes   F: Feeding Goal: Goals: meet % een/epn by next RD f/u  Status: Nutrition Goal Status: new   Current Diet Order   Procedures    Diet Soft & Bite Sized (IDDSI Level 6)      AS: Analgesia/Sedation NA   T: Thromboembolic Prophylaxis On heparin gtt    H: HOB > 300 Yes   U: Stress Ulcer Prophylaxis (if needed) PPI   G: Glucose Control controlled   B: Bowel Function Unmeasured Stool Occurrence: 1   I: Indwelling Catheter (Lines & Hernandez) Necessity Midline 5 days, PIV    D: De-escalation of Antimicrobials/Pharmacotherapies Stopped vanc today , continue tx w cefepime and levoquin     Plan for the day/ETD Transition off bipap and cont to monitor resp status. Advance diet, and rate control     Code Status:  Family/Goals of Care: DNR         Critical secondary to Patient has a condition that poses threat to life and bodily function: Severe Respiratory Distress      Critical care was time spent personally by me on the following activities: development of treatment plan with patient or surrogate and bedside caregivers, discussions with consultants, evaluation of patient's response to treatment, examination of patient, ordering and performing treatments and interventions, ordering and review of laboratory studies, ordering and review of radiographic studies,  pulse oximetry, re-evaluation of patient's condition. This critical care time did not overlap with that of any other provider or involve time for any procedures.     Marcus Davidson MD  Critical Care Medicine  [facility] - Medical ICU

## 2025-06-19 NOTE — PROGRESS NOTES
"[facility] - Medical [facility]  Adult Nutrition  Progress Note    SUMMARY   Recommendations  When medically able, initiate Heart Healthy diet  Nursing, continue documenting PO intake via flowsheets  If alternative means of nutrition warranted, consult RD  Monitor labs, meds, weight, skin    Goals: meet % een/epn by next RD f/u  Nutrition Goal Status: new  Communication of RD Recs: other (comment) (poc)    Nutrition Discharge Planning    Nutrition Discharge Planning: Therapeutic diet (comments)  Therapeutic diet (comments): Heart Healthy diet    Reason for Assessment    Reason For Assessment: identified at risk by screening criteria  Diagnosis: pulmonary disease  General Information Comments: 79 year old male w/ hx of COPD, left lung adenocarcinoma s/p chemoradiation c/b radiation necrosis and pneumonitis, off immunotherapy since 12/24 with metastasis to brain s/p XRT x2 lesions, CAD, SHOLA on CPAP, HTN, TIA, hypertension, chronic venous stasis. RD assessment for MST 5. NPO at this time. Weight flux noted per chart review. Goals of care discussion had today. RD to f/u and monitor.    Nutrition/Diet History    Spiritual, Cultural Beliefs, Mosque Practices, Values that Affect Care: no  Food Allergies: NKFA  Nutrition-related SDOH: None Identified    Anthropometrics    Height: 5' 9" (175.3 cm)  Height (inches): 69 in  Height Method: Stated  Weight: 87.1 kg (192 lb 0.3 oz)  Weight (lb): 192.02 lb  Weight Method: Bed Scale  Ideal Body Weight (IBW), Male: 160 lb  % Ideal Body Weight, Male (lb): 120.01 %  BMI (Calculated): 28.3  BMI Grade: 25 - 29.9 - overweight       Lab/Procedures/Meds    Pertinent Labs Reviewed: reviewed  Pertinent Labs Comments: BUN 26 (H), Glu 156 (H), ALT 48 (H)  Pertinent Medications Reviewed: reviewed  Pertinent Medications Comments: abx, pantoprazole, precedex, heparin    Estimated/Assessed Needs    Weight Used For Calorie Calculations: 87.1 kg (192 lb 0.3 oz)  Energy Calorie Requirements (kcal): " 1903 (msj * 1.2 PAL)  Energy Need Method: Baker-St Bia  Protein Requirements: 87 (1g/kg)  Weight Used For Protein Calculations: 87.1 kg (192 lb 0.3 oz)     Estimated Fluid Requirement Method: RDA Method  RDA Method (mL): 1903         Nutrition Prescription Ordered    Current Diet Order: NPO    Evaluation of Received Nutrient/Fluid Intake    I/O: -1.5 L since admission  Comments: LBM 6/18  % Intake of Estimated Energy Needs: 0 - 25 %  % Meal Intake: NPO    PES Statement  Inadequate protein energy intake related to Acute illness as evidenced by NPO/CL status due to medical condition  Status: New    Nutrition Risk    Level of Risk/Frequency of Follow-up: moderate (f/u 1-2x/week)     Monitor and Evaluation    Monitor and Evaluation: Food and beverage intake, Diet order, Weight, Electrolyte and renal panel, Gastrointestinal profile, Glucose/endocrine profile, Inflammatory profile, Lipid profile     Nutrition Follow-Up    RD Follow-up?: Yes

## 2025-06-19 NOTE — ASSESSMENT & PLAN NOTE
- was started on Precedex for a short period while in the ED due to anxiety, restart home meds when appropriate

## 2025-06-19 NOTE — HOSPITAL COURSE
Admitted to MICU for ARF and Afib w RVR. Received IV dilt and was started on Bipap on 6/18. Became hypotensive with Dilt and was started on  Amio gtt on 6/19 to HR in the 150's. Transitioned from Bipapr to NC 3L on 6/19. Started on Vanc, Cefepime, and Levoquin and Steroids.  Vanc was stopped on 6/19. RIP negative and bcx NGTD. CT imaging notable for basilar airspace opacities w concerns for aspiration/PNA. De-escalated abx to Levaquin to complete course of tx and has been transition to PO eliquis from heparin gtt and PO amio on 6/20. Stable to  stepdown to .

## 2025-06-19 NOTE — PROGRESS NOTES
VANCOMYCIN DOSING BY PHARMACY DISCONTINUATION NOTE    Jordin Allen Jr. is a 79 y.o. male who had been consulted for vancomycin dosing.    The pharmacy consult for vancomycin dosing has been discontinued.     Vancomycin Dosing by Pharmacy Consult will sign-off. Please reconsult if necessary. Thank you for allowing us to participate in this patient's care.     Americo Dumont Pharm.D.

## 2025-06-19 NOTE — CONSULTS
[facility]  Wound Care    Patient Name:  Jordin Allen Jr.   MRN:  [id number]  Date: 6/19/2025  Diagnosis: Acute on chronic respiratory failure with hypoxia    History:     Past Medical History:   Diagnosis Date    Benign neoplasm of colon 10/01/2013    Bronchitis chronic     CAD (coronary artery disease) 10/31/2013    Cataract 2008    COPD (chronic obstructive pulmonary disease)     Emphysema of lung     Encounter for preventive health examination 03/21/2018    GERD (gastroesophageal reflux disease)     Glaucoma 2010    Hearing loss 2015    Heart attack 2013    Hx of colonic polyps     Hypertension     NAFLD (nonalcoholic fatty liver disease) 03/27/2017    SHOLA on CPAP     RLS (restless legs syndrome)     Screen for colon cancer 08/30/2013       Social History[1]    Precautions:     Allergies as of 06/18/2025 - Reviewed 06/18/2025   Allergen Reaction Noted    Brilinta [ticagrelor] Itching 02/03/2017    Lisinopril  07/24/2012    Metoprolol succinate Rash 07/22/2014       WOC Assessment Details/Treatment     Wound care consult received for sacrum, L foot.  Chart reviewed for this encounter.  See flowsheets for findings.    Pt found lying in bed, agreeable to care at this time.   Slowly blanchable, pink discoloration to bridge of nose r/t BiPap use.  L foot w/ dry, flaky skin, scar tissue to R anterior foot. Will order heel lift boots for continued care.  Pt turned into lateral position for assessment of sacrum, pt w/ moisture associated dermatitis across BL buttocks, sacral region. Triad applied for moisture barrier protection.    RECOMMENDATIONS:  BID & PRN - buttocks, sacrum - cleanse gently w/ no rinse bath wipes, pat dry and apply Triad to affected area.    PRN - nose w/ bipap use - cleanse gently w/ NS, pat dry and apply Mepilex foam dressing, change PRN for soilage, otherwise, daily when pt is on BiPap.     06/19/25 1049   WOCN Assessment   WOCN Total Time (mins) 15   Visit Date 06/19/25   Visit Time  1049   Consult Type New   WOCN Speciality Wound   Intervention assessed;changed;applied;chart review;coordination of care;orders   Teaching on-going        Wound 25 1030 Moisture associated dermatitis Sacral spine   Date First Assessed/Time First Assessed: 25 1030   Present on Original Admission: Yes  Primary Wound Type: (c) Moisture associated dermatitis  Location: (c) Sacral spine   Wound Image    Dressing Appearance Dry;Intact;Clean   Drainage Amount None   Drainage Characteristics/Odor No odor   Appearance Pink;Moist   Tissue loss description Partial thickness   Periwound Area Intact;Moist   Wound Edges Irregular   Care Applied:;Skin Barrier          [1]   Social History  Socioeconomic History    Marital status:    Tobacco Use    Smoking status: Former     Current packs/day: 0.00     Average packs/day: 1 pack/day for 40.0 years (40.0 ttl pk-yrs)     Types: Cigarettes     Start date: 8/15/1972     Quit date: 8/15/2012     Years since quittin.8     Passive exposure: Never    Smokeless tobacco: Never   Substance and Sexual Activity    Alcohol use: Yes     Alcohol/week: 3.0 standard drinks of alcohol     Types: 3 Cans of beer per week     Comment: maybe 2-3  beers weekly    Drug use: No    Sexual activity: Yes     Partners: Female   Social History Narrative    Retired .  .       Social Drivers of Health     Financial Resource Strain: Low Risk  (2025)    Overall Financial Resource Strain (CARDIA)     Difficulty of Paying Living Expenses: Not hard at all   Recent Concern: Financial Resource Strain - High Risk (2025)    Overall Financial Resource Strain (CARDIA)     Difficulty of Paying Living Expenses: Hard   Food Insecurity: No Food Insecurity (2025)    Hunger Vital Sign     Worried About Running Out of Food in the Last Year: Never true     Ran Out of Food in the Last Year: Never true   Transportation Needs: No Transportation Needs (2025)    PRAPARE -  Transportation     Lack of Transportation (Medical): No     Lack of Transportation (Non-Medical): No   Physical Activity: Unknown (6/9/2025)    Exercise Vital Sign     Days of Exercise per Week: 0 days     Minutes of Exercise per Session: Patient unable to answer   Stress: Stress Concern Present (6/18/2025)    [employer] - Occupational Stress Questionnaire     Feeling of Stress : To some extent   Housing Stability: Low Risk  (6/18/2025)    Housing Stability Vital Sign     Unable to Pay for Housing in the Last Year: No     Number of Times Moved in the Last Year: 0     Homeless in the Last Year: No

## 2025-06-19 NOTE — PLAN OF CARE
MICU DAILY GOALS     Family/Goals of care/Code Status   Code Status: DNR    24H Vital Sign Range  Temp:  [97.7 °F (36.5 °C)-98.6 °F (37 °C)]   Pulse:  []   Resp:  [16-30]   BP: ()/(52-84)   SpO2:  [89 %-99 %]      Shift Events (include procedures and significant events)   Pt HR up to the 150s this am, sustained; amio drip started. Transitioned off of continuous BiPAP to 3 NL this afternoon. Pt tolerates NC though noted to have poor reserve with noted SOB and increase of O2 requirements with minimal physical activity.     AWAKE RASS: Goal -    Actual - RASS (Vasquez Agitation-Sedation Scale): alert and calm    Restraint necessity: Not necessary   BREATHE SBT: Not intubated    Coordinate A & B, analgesics/sedatives Pain: managed   SAT: Not intubated   Delirium CAM-ICU:     Early(intubated/ Progressive (non-intubated) Mobility MOVE Screen (INTUBATED ONLY): Not intubated    Activity: Activity Management: Rolling - L1   Feeding/Nutrition Diet order:  ,     Thrombus DVT prophylaxis:     HOB Elevation Head of Bed (HOB) Positioning: HOB at 30-45 degrees   Ulcer Prophylaxis GI: yes   Glucose control managed     Skin Skin assessment:     Sacrum intact/not altered? No  Heels intact/not altered? Yes  Surgical wound? No    CHECK ONE!   (no altered skin or altered skin) and sub boxes:  [] No Altered Skin Integrity Present    []Prevention Measures Documented    [x] Altered Skin Integrity Present or Discovered   [x] LDA already present in EPIC, daily doc completed              [] LDA added if not already in EPIC (describe/stage wound).               [] Wound Image Taken (required on admit,                   transfer/discharge and every Tuesday)    Wound Care Consulted? Yes   Bowel Function no issues    Indwelling Catheter Necessity            De-escalation Antibiotics Yes        VS and assessment per flow sheet, patient progressing towards goals as tolerated, plan of care reviewed with patient and daughter, all  concerns addressed.

## 2025-06-19 NOTE — EICU
Intervention Initiated From:  [facility] / EICU    Ramon intervened regarding:  Rounding (Video assessment)    Comments: Virtual ICU Quality Rounds    Admit Date: 6/18/2025  Hospital Day: 1    ICU Day: 22h    24H Vital Sign Range:  Temp:  [96.7 °F (35.9 °C)-98.4 °F (36.9 °C)]   Pulse:  []   Resp:  [16-30]   BP: ()/()   SpO2:  [93 %-99 %]     VICU Surveillance Screening    LDA reconciliation : Yes

## 2025-06-19 NOTE — PROGRESS NOTES
Initially, HR noted to be increasing into the 140s-150s unsustained but irregular. Now HR into the 150s sustained. BP stable at this time. Pt denies any complaints. O2 sats WNL on AVAPS. MD aware. Plan to possible start dilt or amio, awaiting orders.

## 2025-06-19 NOTE — ASSESSMENT & PLAN NOTE
Patient with a history of COPD On Symbicort Spiriva and Daliresp at home.  Follows up with pulmonology outpatient.   Presents with continued dyspnea, use of accessory muscles for breathing, and worsening of baseline hypoxia currently.  Patient is currently on COPD Pathway. Continue scheduled inhalers Steroids, Antibiotics, and Supplemental oxygen and monitor respiratory status closely.   - Continue home inhalers   - Continue Levabuterol while inpatient, given that albuterol can cause tachycardia

## 2025-06-19 NOTE — ASSESSMENT & PLAN NOTE
Patient with known history of  CAD s/p stent placement and CABG, which is controlled Will continue Statin and monitor for S/Sx of angina/ACS. Continue to monitor on telemetry.

## 2025-06-20 PROBLEM — L24.A2 IRRITANT CONTACT DERMATITIS DUE TO FECAL, URINARY OR DUAL INCONTINENCE: Status: ACTIVE | Noted: 2025-06-20

## 2025-06-20 LAB
ABSOLUTE EOSINOPHIL (OHS): 0 K/UL
ABSOLUTE MONOCYTE (OHS): 0.67 K/UL (ref 0.3–1)
ABSOLUTE NEUTROPHIL COUNT (OHS): 11.69 K/UL (ref 1.8–7.7)
ALBUMIN SERPL BCP-MCNC: 1.7 G/DL (ref 3.5–5.2)
ALP SERPL-CCNC: 99 UNIT/L (ref 40–150)
ALT SERPL W/O P-5'-P-CCNC: 68 UNIT/L (ref 10–44)
ANION GAP (OHS): 10 MMOL/L (ref 8–16)
APTT PPP: 55 SECONDS (ref 21–32)
AST SERPL-CCNC: 50 UNIT/L (ref 11–45)
BASOPHILS # BLD AUTO: 0.01 K/UL
BASOPHILS NFR BLD AUTO: 0.1 %
BILIRUB SERPL-MCNC: 0.3 MG/DL (ref 0.1–1)
BUN SERPL-MCNC: 34 MG/DL (ref 8–23)
CALCIUM SERPL-MCNC: 8.7 MG/DL (ref 8.7–10.5)
CHLORIDE SERPL-SCNC: 103 MMOL/L (ref 95–110)
CO2 SERPL-SCNC: 26 MMOL/L (ref 23–29)
CREAT SERPL-MCNC: 1.2 MG/DL (ref 0.5–1.4)
ERYTHROCYTE [DISTWIDTH] IN BLOOD BY AUTOMATED COUNT: 16.8 % (ref 11.5–14.5)
GFR SERPLBLD CREATININE-BSD FMLA CKD-EPI: >60 ML/MIN/1.73/M2
GLUCOSE SERPL-MCNC: 175 MG/DL (ref 70–110)
HCT VFR BLD AUTO: 27.3 % (ref 40–54)
HGB BLD-MCNC: 8.9 GM/DL (ref 14–18)
IMM GRANULOCYTES # BLD AUTO: 0.29 K/UL (ref 0–0.04)
IMM GRANULOCYTES NFR BLD AUTO: 2.3 % (ref 0–0.5)
LYMPHOCYTES # BLD AUTO: 0.17 K/UL (ref 1–4.8)
MAGNESIUM SERPL-MCNC: 2.1 MG/DL (ref 1.6–2.6)
MCH RBC QN AUTO: 28.4 PG (ref 27–31)
MCHC RBC AUTO-ENTMCNC: 32.6 G/DL (ref 32–36)
MCV RBC AUTO: 87 FL (ref 82–98)
NUCLEATED RBC (/100WBC) (OHS): 0 /100 WBC
OHS QRS DURATION: 72 MS
OHS QTC CALCULATION: 477 MS
PHOSPHATE SERPL-MCNC: 2.6 MG/DL (ref 2.7–4.5)
PLATELET # BLD AUTO: 206 K/UL (ref 150–450)
PMV BLD AUTO: 10.3 FL (ref 9.2–12.9)
POTASSIUM SERPL-SCNC: 3.4 MMOL/L (ref 3.5–5.1)
PROT SERPL-MCNC: 5.2 GM/DL (ref 6–8.4)
RBC # BLD AUTO: 3.13 M/UL (ref 4.6–6.2)
RELATIVE EOSINOPHIL (OHS): 0 %
RELATIVE LYMPHOCYTE (OHS): 1.3 % (ref 18–48)
RELATIVE MONOCYTE (OHS): 5.2 % (ref 4–15)
RELATIVE NEUTROPHIL (OHS): 91.1 % (ref 38–73)
SODIUM SERPL-SCNC: 139 MMOL/L (ref 136–145)
WBC # BLD AUTO: 12.83 K/UL (ref 3.9–12.7)

## 2025-06-20 PROCEDURE — 25000242 PHARM REV CODE 250 ALT 637 W/ HCPCS

## 2025-06-20 PROCEDURE — 85025 COMPLETE CBC W/AUTO DIFF WBC: CPT

## 2025-06-20 PROCEDURE — 63600175 PHARM REV CODE 636 W HCPCS

## 2025-06-20 PROCEDURE — 27000221 HC OXYGEN, UP TO 24 HOURS

## 2025-06-20 PROCEDURE — 84100 ASSAY OF PHOSPHORUS: CPT | Performed by: STUDENT IN AN ORGANIZED HEALTH CARE EDUCATION/TRAINING PROGRAM

## 2025-06-20 PROCEDURE — 25000242 PHARM REV CODE 250 ALT 637 W/ HCPCS: Performed by: STUDENT IN AN ORGANIZED HEALTH CARE EDUCATION/TRAINING PROGRAM

## 2025-06-20 PROCEDURE — 94799 UNLISTED PULMONARY SVC/PX: CPT

## 2025-06-20 PROCEDURE — 25000003 PHARM REV CODE 250

## 2025-06-20 PROCEDURE — 80053 COMPREHEN METABOLIC PANEL: CPT | Performed by: STUDENT IN AN ORGANIZED HEALTH CARE EDUCATION/TRAINING PROGRAM

## 2025-06-20 PROCEDURE — 85730 THROMBOPLASTIN TIME PARTIAL: CPT

## 2025-06-20 PROCEDURE — 94761 N-INVAS EAR/PLS OXIMETRY MLT: CPT

## 2025-06-20 PROCEDURE — 27100171 HC OXYGEN HIGH FLOW UP TO 24 HOURS

## 2025-06-20 PROCEDURE — 18500000 HC LEAVE OF ABSENCE HOSPITAL SERVICES

## 2025-06-20 PROCEDURE — 20600001 HC STEP DOWN PRIVATE ROOM

## 2025-06-20 PROCEDURE — 99900035 HC TECH TIME PER 15 MIN (STAT)

## 2025-06-20 PROCEDURE — 99222 1ST HOSP IP/OBS MODERATE 55: CPT | Mod: ,,, | Performed by: NURSE PRACTITIONER

## 2025-06-20 PROCEDURE — 94640 AIRWAY INHALATION TREATMENT: CPT

## 2025-06-20 PROCEDURE — 83735 ASSAY OF MAGNESIUM: CPT | Performed by: STUDENT IN AN ORGANIZED HEALTH CARE EDUCATION/TRAINING PROGRAM

## 2025-06-20 PROCEDURE — 63600175 PHARM REV CODE 636 W HCPCS: Mod: JZ,TB

## 2025-06-20 PROCEDURE — 99233 SBSQ HOSP IP/OBS HIGH 50: CPT | Mod: GC,,, | Performed by: EMERGENCY MEDICINE

## 2025-06-20 PROCEDURE — 94660 CPAP INITIATION&MGMT: CPT

## 2025-06-20 RX ORDER — PREDNISONE 20 MG/1
40 TABLET ORAL DAILY
Status: COMPLETED | OUTPATIENT
Start: 2025-06-21 | End: 2025-06-22

## 2025-06-20 RX ORDER — AMIODARONE HYDROCHLORIDE 200 MG/1
200 TABLET ORAL DAILY
Status: DISCONTINUED | OUTPATIENT
Start: 2025-06-30 | End: 2025-06-22 | Stop reason: HOSPADM

## 2025-06-20 RX ORDER — AMIODARONE HYDROCHLORIDE 200 MG/1
400 TABLET ORAL 2 TIMES DAILY
Status: DISCONTINUED | OUTPATIENT
Start: 2025-06-20 | End: 2025-06-22 | Stop reason: HOSPADM

## 2025-06-20 RX ORDER — TALC
6 POWDER (GRAM) TOPICAL NIGHTLY PRN
Status: DISCONTINUED | OUTPATIENT
Start: 2025-06-20 | End: 2025-06-22 | Stop reason: HOSPADM

## 2025-06-20 RX ADMIN — MUPIROCIN: 20 OINTMENT TOPICAL at 08:06

## 2025-06-20 RX ADMIN — PANTOPRAZOLE SODIUM 40 MG: 40 TABLET, DELAYED RELEASE ORAL at 10:06

## 2025-06-20 RX ADMIN — METHYLPREDNISOLONE SODIUM SUCCINATE 40 MG: 40 INJECTION, POWDER, FOR SOLUTION INTRAMUSCULAR; INTRAVENOUS at 10:06

## 2025-06-20 RX ADMIN — AMIODARONE HYDROCHLORIDE 400 MG: 200 TABLET ORAL at 08:06

## 2025-06-20 RX ADMIN — LEVALBUTEROL 1.25 MG: 1.25 SOLUTION, CONCENTRATE RESPIRATORY (INHALATION) at 11:06

## 2025-06-20 RX ADMIN — IPRATROPIUM BROMIDE 0.5 MG: 0.5 SOLUTION RESPIRATORY (INHALATION) at 08:06

## 2025-06-20 RX ADMIN — AMIODARONE HYDROCHLORIDE 0.5 MG/MIN: 1.8 INJECTION, SOLUTION INTRAVENOUS at 12:06

## 2025-06-20 RX ADMIN — MUPIROCIN: 20 OINTMENT TOPICAL at 10:06

## 2025-06-20 RX ADMIN — APIXABAN 5 MG: 5 TABLET, FILM COATED ORAL at 01:06

## 2025-06-20 RX ADMIN — LEVALBUTEROL 1.25 MG: 1.25 SOLUTION, CONCENTRATE RESPIRATORY (INHALATION) at 03:06

## 2025-06-20 RX ADMIN — APIXABAN 5 MG: 5 TABLET, FILM COATED ORAL at 08:06

## 2025-06-20 RX ADMIN — LEVETIRACETAM 500 MG: 500 TABLET, FILM COATED ORAL at 10:06

## 2025-06-20 RX ADMIN — IPRATROPIUM BROMIDE 0.5 MG: 0.5 SOLUTION RESPIRATORY (INHALATION) at 11:06

## 2025-06-20 RX ADMIN — IPRATROPIUM BROMIDE 0.5 MG: 0.5 SOLUTION RESPIRATORY (INHALATION) at 07:06

## 2025-06-20 RX ADMIN — IPRATROPIUM BROMIDE 0.5 MG: 0.5 SOLUTION RESPIRATORY (INHALATION) at 04:06

## 2025-06-20 RX ADMIN — Medication 6 MG: at 08:06

## 2025-06-20 RX ADMIN — LEVALBUTEROL 1.25 MG: 1.25 SOLUTION, CONCENTRATE RESPIRATORY (INHALATION) at 07:06

## 2025-06-20 RX ADMIN — LEVALBUTEROL 1.25 MG: 1.25 SOLUTION, CONCENTRATE RESPIRATORY (INHALATION) at 04:06

## 2025-06-20 RX ADMIN — LEVALBUTEROL 1.25 MG: 1.25 SOLUTION, CONCENTRATE RESPIRATORY (INHALATION) at 08:06

## 2025-06-20 RX ADMIN — AMIODARONE HYDROCHLORIDE 400 MG: 200 TABLET ORAL at 01:06

## 2025-06-20 RX ADMIN — TRAZODONE HYDROCHLORIDE 25 MG: 50 TABLET ORAL at 11:06

## 2025-06-20 RX ADMIN — LEVETIRACETAM 500 MG: 500 TABLET, FILM COATED ORAL at 08:06

## 2025-06-20 RX ADMIN — IPRATROPIUM BROMIDE 0.5 MG: 0.5 SOLUTION RESPIRATORY (INHALATION) at 03:06

## 2025-06-20 RX ADMIN — ATORVASTATIN CALCIUM 80 MG: 40 TABLET, FILM COATED ORAL at 10:06

## 2025-06-20 RX ADMIN — POTASSIUM BICARBONATE 50 MEQ: 977.5 TABLET, EFFERVESCENT ORAL at 10:06

## 2025-06-20 RX ADMIN — LEVOFLOXACIN 750 MG: 5 INJECTION, SOLUTION INTRAVENOUS at 11:06

## 2025-06-20 RX ADMIN — CEFEPIME 2 G: 2 INJECTION, POWDER, FOR SOLUTION INTRAVENOUS at 04:06

## 2025-06-20 NOTE — EICU
Intervention Initiated From:  [facility] / [facility]    Ramon intervened regarding:  Rounding (Video assessment)    VICU Night Rounds Checklist  24H Vital Sign Range:  Temp:  [98 °F (36.7 °C)-98.6 °F (37 °C)]   Pulse:  []   Resp:  [16-29]   BP: ()/(52-84)   SpO2:  [89 %-100 %]     Video rounds and LDA reconciliation

## 2025-06-20 NOTE — ASSESSMENT & PLAN NOTE
- consult received for evaluation of skin injury.  - pt presenting from LTAC with chest pain, shortness of breath, and tachycardia.   - sacral and buttocks with small areas partial thickness tissue loss and dry, flaky irritated skin.  - pt has stool incontinence. Urinal at bedside.  - continue treatment per wound care RN recs.  - lauren intouch surface.  - pillows for offloading.  - turn q2h.  - sole score is 15 with a nutrition sub scale score of 2.  - RD following.  - nursing to maintain pressure injury prevention measures and continue wound care per orders.

## 2025-06-20 NOTE — CONSULTS
[facility] - Medical ICU  Skin Integrity JARAD  Consult Note    Patient Name: Jordin Allen Jr.  MRN: [id number]  Admission Date: 6/18/2025  Hospital Length of Stay: 2 days  Attending Physician: Theo Lucia MD  Primary Care Provider: Sierra Landry MD     Inpatient consult to Skin Integrity  Practitioner  Consult performed by: Stormy Brewer NP  Consult ordered by: Theo Lucia MD        Subjective:     History of Present Illness:  Jordin Allen is a 79 year old male with PMHx COPD(4L), SHOLA, HTN, CAD, left lung ca (brain mets) s/p chemo/RT/immuno (12/24), GERD, HLD presenting from LTAC with chest pain, shortness of breath, and tachycardia. He was discharged 2 weeks ago after hospitalization for hypoxic respiratory failure due to parainfluenza pneumonia and COPD exacerbation, during which he was started on Apixaban and Diltiazem. Since returning to LTAC, he had persistent respiratory symptoms but acutely developed severe (9/10) chest pain, worsening SOB, and rapid heart rate this morning. Despite being on Diltiazem, he remained tachycardic, and EMS was called. En route, he became hypotensive and was electrically cardioverted, with only transient slowing of rate. He is currently on Cefepime, Vancomycin and levaquin for pneumonia. Of note, he has been [place] [facility] [place] in the last month for continued SOB and COPD exacerbation. Admitted to  for further management. Skin integrity JARAD consulted for evaluation of skin injury.    Scheduled Meds:   atorvastatin  80 mg Oral Daily    ceFEPime IV (PEDS and ADULTS)  2 g Intravenous Q8H    ipratropium  0.5 mg Nebulization Q4H    levalbuterol  1.25 mg Nebulization Q4H    levETIRAcetam  500 mg Oral BID    levoFLOXacin  750 mg Intravenous Q24H    methylPREDNISolone injection (PEDS and ADULTS)  40 mg Intravenous Daily    mupirocin   Nasal BID    pantoprazole  40 mg Oral Daily     Continuous Infusions:   amiodarone in dextrose 5%  0.5 mg/min Intravenous  POC reviewed with pt, unable to verbalize understanding. Left message for wife. No acute events today. Increasing ventilator support, now Rr24 Tv420 fio2 60% PEEP 14. Sats apprx 90%. Declotted trialysis catheter. CRRT started this evening. Plan to run for 6hrs. Will continue to monitor. See flowsheets for full assessment and VS info.      Continuous 16.7 mL/hr at 06/20/25 0600 0.5 mg/min at 06/20/25 0600    heparin (porcine) in D5W  0-40 Units/kg/hr Intravenous Continuous 10.9 mL/hr at 06/20/25 0600 12.018 Units/kg/hr at 06/20/25 0600     PRN Meds:  Current Facility-Administered Medications:     acetaminophen, 650 mg, Oral, Q8H PRN    dextrose 50%, 12.5 g, Intravenous, PRN    dextrose 50%, 25 g, Intravenous, PRN    glucagon (human recombinant), 1 mg, Intramuscular, PRN    glucose, 16 g, Oral, PRN    glucose, 24 g, Oral, PRN    heparin (PORCINE), 60 Units/kg, Intravenous, PRN    heparin (PORCINE), 30 Units/kg, Intravenous, PRN    naloxone, 0.02 mg, Intravenous, PRN    senna-docusate, 1 tablet, Oral, Daily PRN    sodium chloride 0.9%, 10 mL, Intravenous, Q12H PRN    Review of patient's allergies indicates:   Allergen Reactions    Brilinta [ticagrelor] Itching    Lisinopril      Other reaction(s): cough    Metoprolol succinate Rash        Past Medical History:   Diagnosis Date    Benign neoplasm of colon 10/01/2013    Bronchitis chronic     CAD (coronary artery disease) 10/31/2013    Cataract 2008    COPD (chronic obstructive pulmonary disease)     Emphysema of lung     Encounter for preventive health examination 03/21/2018    GERD (gastroesophageal reflux disease)     Glaucoma 2010    Hearing loss 2015    Heart attack 2013    Hx of colonic polyps     Hypertension     NAFLD (nonalcoholic fatty liver disease) 03/27/2017    SHOLA on CPAP     RLS (restless legs syndrome)     Screen for colon cancer 08/30/2013     Past Surgical History:   Procedure Laterality Date    bilateral foot surgery  1993    CARDIAC CATHETERIZATION  2013    x1    CATARACT EXTRACTION, BILATERAL      COLON SURGERY  2013    Ace Mott MD    COLONOSCOPY N/A 03/21/2018    Procedure: COLONOSCOPY;  Surgeon: Terry Mott MD;  Location: [address]);  Service: Endoscopy;  Laterality: N/A;    COLONOSCOPY N/A 04/12/2019    Procedure: COLONOSCOPY;  Surgeon: John Mantilla MD;   Location: [facility] ENDO (4TH FLR);  Service: Endoscopy;  Laterality: N/A;    COLONOSCOPY N/A 2022    Procedure: COLONOSCOPY;  Surgeon: MEDINA Farris MD;  Location: [facility] ENDO (4TH FLR);  Service: Endoscopy;  Laterality: N/A;  fully vacc-inst portal-tb    ENDOBRONCHIAL ULTRASOUND Bilateral 2024    Procedure: ENDOBRONCHIAL ULTRASOUND (EBUS);  Surgeon: Naveed Aguero MD;  Location: [facility] OR;  Service: Pulmonary;  Laterality: Bilateral;    EYE SURGERY      scrotal abscess removal      TYMPANOSTOMY TUBE PLACEMENT         Family History       Problem Relation (Age of Onset)    Diabetes Maternal Grandmother    Heart attack Mother    Heart disease Mother    Hypertension Mother    No Known Problems Sister, Daughter          Tobacco Use    Smoking status: Former     Current packs/day: 0.00     Average packs/day: 1 pack/day for 40.0 years (40.0 ttl pk-yrs)     Types: Cigarettes     Start date: 8/15/1972     Quit date: 8/15/2012     Years since quittin.8     Passive exposure: Never    Smokeless tobacco: Never   Substance and Sexual Activity    Alcohol use: Yes     Alcohol/week: 3.0 standard drinks of alcohol     Types: 3 Cans of beer per week     Comment: maybe 2-3  beers weekly    Drug use: No    Sexual activity: Yes     Partners: Female     Review of Systems   Skin:  Positive for wound.       Objective:     Vital Signs (Most Recent):  Temp: 98.4 °F (36.9 °C) (25 0721)  Pulse: 80 (25 1100)  Resp: 17 (25 1100)  BP: 139/68 (25 1100)  SpO2: (!) 94 % (25 1100) Vital Signs (24h Range):  Temp:  [98.2 °F (36.8 °C)-98.6 °F (37 °C)] 98.4 °F (36.9 °C)  Pulse:  [] 80  Resp:  [13-28] 17  SpO2:  [89 %-100 %] 94 %  BP: ()/(54-84) 139/68     Weight: 87.1 kg (192 lb 0.3 oz)  Body mass index is 28.36 kg/m².     Physical Exam  Constitutional:       Appearance: Normal appearance.   Skin:     General: Skin is warm and dry.      Findings: Lesion present.   Neurological:      Mental  Status: He is alert.          Laboratory:  All pertinent labs reviewed within the last 24 hours.    Diagnostic Results:  None      Assessment/Plan:              JARAD Skin Integrity Evaluation      Skin Integrity JARAD evaluation of patient as part of the comprehensive skin care team.     He has been admitted for 2 days. Skin injury was noted on 6/4/25. POA yes.    Sacrum/buttocks      Bridge of nose          Derm  Irritant contact dermatitis due to fecal, urinary or dual incontinence  - consult received for evaluation of skin injury.  - pt presenting from LTAC with chest pain, shortness of breath, and tachycardia.   - sacral and buttocks with small areas partial thickness tissue loss and dry, flaky irritated skin.  - pt has stool incontinence. Urinal at bedside.  - continue treatment per wound care RN recs.  - lauren intouch surface.  - pillows for offloading.  - turn q2h.  - sole score is 15 with a nutrition sub scale score of 2.  - RD following.  - nursing to maintain pressure injury prevention measures and continue wound care per orders.      Intact blanchable redness to bridge of nose. Pt wearing home bipap mask. Asked respiratory therapist to place liquicell patch to area.      Thank you for your consult. I will follow-up with patient. Please contact us if you have any additional questions.      Stormy Brewer NP  Skin Integrity JARAD  [facility] - Medical ICU

## 2025-06-20 NOTE — SUBJECTIVE & OBJECTIVE
Interval History/Significant Events: NAEON, used Bipap at night and for naps. Transition back to NC this am, reports not being able to sleep much and would like melatonin. De-escalate abx to levaquin only and amio gtt to PO amio.     Review of Systems  Objective:     Vital Signs (Most Recent):  Temp: 98.4 °F (36.9 °C) (06/20/25 0721)  Pulse: 89 (06/20/25 1130)  Resp: (!) 30 (06/20/25 1130)  BP: 139/68 (06/20/25 1100)  SpO2: 98 % (06/20/25 1130) Vital Signs (24h Range):  Temp:  [98.2 °F (36.8 °C)-98.6 °F (37 °C)] 98.4 °F (36.9 °C)  Pulse:  [] 89  Resp:  [13-30] 30  SpO2:  [89 %-100 %] 98 %  BP: ()/(60-84) 139/68   Weight: 87.1 kg (192 lb 0.3 oz)  Body mass index is 28.36 kg/m².      Intake/Output Summary (Last 24 hours) at 6/20/2025 1504  Last data filed at 6/20/2025 1112  Gross per 24 hour   Intake 1282.65 ml   Output 575 ml   Net 707.65 ml          Physical Exam  Vitals and nursing note reviewed.   Constitutional:       General: He is not in acute distress.     Appearance: He is ill-appearing. He is not toxic-appearing.   HENT:      Head: Normocephalic and atraumatic.   Eyes:      General: No scleral icterus.        Right eye: No discharge.         Left eye: No discharge.      Extraocular Movements: Extraocular movements intact.      Conjunctiva/sclera: Conjunctivae normal.      Pupils: Pupils are equal, round, and reactive to light.   Cardiovascular:      Rate and Rhythm: Tachycardia present. Rhythm irregular.      Pulses: Normal pulses.      Heart sounds: Normal heart sounds. No murmur heard.  Pulmonary:      Effort: Respiratory distress present.      Breath sounds: No stridor. Rhonchi present. No wheezing or rales.      Comments: On BiPAP  Chest:      Chest wall: No tenderness.   Abdominal:      General: Abdomen is flat. There is no distension.      Palpations: Abdomen is soft. There is no mass.      Tenderness: There is no abdominal tenderness.      Hernia: No hernia is present.   Musculoskeletal:       Right lower leg: No edema.      Left lower leg: No edema.   Skin:     General: Skin is warm.      Coloration: Skin is not jaundiced or pale.      Findings: No erythema or rash.   Neurological:      Mental Status: He is alert and oriented to person, place, and time.   Psychiatric:         Mood and Affect: Mood normal.         Behavior: Behavior normal.            Vents:  Oxygen Concentration (%): 50 (06/20/25 0701)  Lines/Drains/Airways       Peripheral Intravenous Line  Duration                  Midline Catheter - Single Lumen 06/14/25 1245 Left basilic vein (medial side of arm) other (see comments) 6 days    Peripheral IV Single Lumen 06/19/25 0854 20 G 1 3/4 in Anterior;Right Forearm 1 day    Peripheral IV 06/19/25 2111 22 G Right Hand <1 day                  Significant Labs:    CBC/Anemia Profile:  Recent Labs   Lab 06/19/25 0440 06/20/25  0338   WBC 8.86 12.83*   HGB 8.8* 8.9*   HCT 27.4* 27.3*    206   MCV 88 87   RDW 16.6* 16.8*        Chemistries:  Recent Labs   Lab 06/19/25 0440 06/20/25  0338    139   K 3.9 3.4*    103   CO2 26 26   BUN 26* 34*   CREATININE 0.9 1.2   CALCIUM 8.5* 8.7   ALBUMIN 1.7* 1.7*   PROT 5.0* 5.2*   BILITOT 0.4 0.3   ALKPHOS 94 99   ALT 48* 68*   AST 32 50*   MG 1.9 2.1   PHOS 3.3 2.6*       BMP:   Recent Labs   Lab 06/20/25  0338   *      K 3.4*      CO2 26   BUN 34*   CREATININE 1.2   CALCIUM 8.7   MG 2.1     CMP:   Recent Labs   Lab 06/19/25  0440 06/20/25  0338    139   K 3.9 3.4*    103   CO2 26 26   * 175*   BUN 26* 34*   CREATININE 0.9 1.2   CALCIUM 8.5* 8.7   PROT 5.0* 5.2*   ALBUMIN 1.7* 1.7*   BILITOT 0.4 0.3   ALKPHOS 94 99   AST 32 50*   ALT 48* 68*   ANIONGAP 8 10     All pertinent labs within the past 24 hours have been reviewed.    Significant Imaging:  I have reviewed all pertinent imaging results/findings within the past 24 hours.

## 2025-06-20 NOTE — HPI
Jordin Allen is a 79 year old male with PMHx COPD(4L), SHOLA, HTN, CAD, left lung ca (brain mets) s/p chemo/RT/immuno (12/24), GERD, HLD presenting from LTAC with chest pain, shortness of breath, and tachycardia. He was discharged 2 weeks ago after hospitalization for hypoxic respiratory failure due to parainfluenza pneumonia and COPD exacerbation, during which he was started on Apixaban and Diltiazem. Since returning to LTAC, he had persistent respiratory symptoms but acutely developed severe (9/10) chest pain, worsening SOB, and rapid heart rate this morning. Despite being on Diltiazem, he remained tachycardic, and EMS was called. En route, he became hypotensive and was electrically cardioverted, with only transient slowing of rate. He is currently on Cefepime, Vancomycin and levaquin for pneumonia. Of note, he has been admitted to [facility] [place] x3 in the last month for continued SOB and COPD exacerbation. Admitted to  for further management. Skin integrity JARAD consulted for evaluation of skin injury.

## 2025-06-20 NOTE — RESIDENT HANDOFF
Handoff     [place] [facility] [place] [place] [facility] Room Number: [id number] A     Patient Name: Jordin Allen Jr. MRN: [id number]     Date of Birth: [id number] Allergies: Brilinta [ticagrelor], Lisinopril, and Metoprolol succinate     Age: 79 y.o. Admit Date: 6/18/2025     Sex: male  BMI: Body mass index is 28.36 kg/m².     Code Status: DNR        Illness Level (current clinical status): Watcher - No    Reason for Admission: Acute on chronic respiratory failure with hypoxia    Brief HPI (pertinent PMH and diagnosis or differential diagnosis): 79 y.o. M with pmhx of COPD on chronic 4L NC, GERD, HTN, SHOLA, CAD, NAFLD, dyslipidemia, L lung cancer s/p chemo and radiation c/b radiation necrosis, off immunotherapy since 12/24, metastasis to brain s/p XRT ecently discharged to LTAC on 06/11 after treatment for parainfluenza virus and COPD exacerbation. Admitted to MICU for ARF w hypoxia and Afib w RVR.     Procedure Date: NA    Hospital Course (updated, brief assessment by system or problem, significant events): Admitted to MICU for ARF and Afib w RVR. Received IV dilt and was started on Bipap on 6/18. Became hypotensive with Dilt and was started on  Amio gtt on 6/19 to HR in the 150's. Transitioned from Bipapr to NC 3L on 6/19. Started on Vanc, Cefepime, and Levoquin and Steroids.  Vanc was stopped on 6/19. RIP negative and bcx NGTD. CT imaging notable for basilar airspace opacities w concerns for aspiration/PNA.  De-escalated abx to Levaquin to complete course of tx and has been transition to PO eliquis from heparin gtt and PO amio on 6/20 for HR controlled.     Tasks (specific, using if-then statements): advance diet, cont to monitor resp status and HR, discharge planning.     Contingency Plan (special circumstances anticipated and plan): Continue to advance diet, PT/OT eval, discharge planning      Estimated Discharge Date: TBD based on clinical status     Discharge Disposition: TBD      Mentored By: Dr Lucia

## 2025-06-20 NOTE — ASSESSMENT & PLAN NOTE
Patient with mix of irregular and regular SVT concerning for MAT vs afib w rvr cannot be certain based on ECG and telemetry. This has occurred in the setting of acute on chronic hypoxic respiratory failure in the setting of pneumonia vs COPD exacerbation. He has a recent normal TTE and an allergy to metoprolol. Has a history of documented suspected Aflutter back on June 9-11 which he converted to sinus rhythm with PACs after IV diltiazem and started on PO Dilt at home.      Recommendations:  - continue telemetry  - daily ECG as needed   - K > 4, Mg > 2  - Received IV Dilt in the ED   - recommend AC given CHADSVASc of 4 , continue home eliquis   - Consider starting Amiodarone if patient rate remains uncontrolled since he became hypotensive with Dilt   - Amio gtt started on 6/19 due to persistent uncontrolled rate  , transition to PO on 6/20   - hep gtt transition to eliquis on 6/20

## 2025-06-20 NOTE — ASSESSMENT & PLAN NOTE
79 year old male with a PMHx COPD on 4L baseline, SHOLA, HTN, CAD, left lung adenocarcinoma with brain mets s/p chemo/RT/immuno (12/24), GERD, HLD presented to [facility] [place] with worsening shortness of breath. In the ED, he was AF. 170s bpm. Labs were notable for leukocytosis 17.04 with left shift. Hgb 11.7. K 3.3. Phosphorus 1.8. BNP and Trop wnl. Lactate normal. VBG 7.471/38.7/32.9/27.7. He was started on 15L non breather and transitioned to BiPAP and weaned to HFNC 60L but BiPAP was restarted with concern for increased WOB despite VBG showing some improvement and O2 sats in the 90s. HR had minimal response to IV Diltiazem. CXR with similar perihilar and lower lobe infiltrates within both lungs. CT chest w/o contrast with interval development of bilateral small pleural effusion and R and L ground glass opacities concerning for progression of infectious processes.    Patient with Hypoxic Respiratory failure which is Acute on chronic.  he is on home oxygen at 4 LPM. Supplemental oxygen was provided and noted- Oxygen Concentration (%):  [40-60] 50    .   Signs/symptoms of respiratory failure include- tachypnea, increased work of breathing, respiratory distress, and use of accessory muscles. Contributing possible diagnoses includes - COPD, Pleural effusion, and severe Pneumonia Labs and images were reviewed. Patient Has recent ABG, which has been reviewed. Will treat underlying causes and adjust management of respiratory failure as follows-     - F/u Bcx  NGTD   - Resp cx pending   - BNP 62, Lac of 1.2  - RIP negative   - Procal .71     - CTA chest with ill-defined airspace opacities in the right upper and middle lobes when compared to previous on 5/22/25, which could indicate  aspiration, Pna.   - Continue levalbuterol and home inhaler   - Continue abx with cefepime, and Levoquin and de-escalate as clinically appropriate. Vanc stopped on 6/19, Cefepime on 6/20. Will continue Levaquin to finish course of tx.    -  Continue Bipap qhs and continue to monitor respiratory status and repeat abg as needed. Transitioned to NC on 6/19 with Bipap qhs

## 2025-06-20 NOTE — PLAN OF CARE
MICU DAILY GOALS     Family/Goals of care/Code Status   Code Status: DNR    24H Vital Sign Range  Temp:  [98.2 °F (36.8 °C)-98.4 °F (36.9 °C)]   Pulse:  []   Resp:  [13-35]   BP: ()/()   SpO2:  [90 %-100 %]      Shift Events (include procedures and significant events)   No acute events throughout shift    AWAKE RASS: Goal -    Actual - RASS (Vasquez Agitation-Sedation Scale): alert and calm    Restraint necessity: Not necessary   BREATHE SBT: Not intubated    Coordinate A & B, analgesics/sedatives Pain: managed   SAT: Not intubated   Delirium CAM-ICU:     Early(intubated/ Progressive (non-intubated) Mobility MOVE Screen (INTUBATED ONLY): Not intubated    Activity: Activity Management: Rolling - L1   Feeding/Nutrition Diet order: Diet/Nutrition Received: low saturated fat/low cholesterol,     Thrombus DVT prophylaxis:     HOB Elevation Head of Bed (HOB) Positioning: HOB elevated   Ulcer Prophylaxis GI: yes   Glucose control managed     Skin Skin assessment:     Sacrum intact/not altered? No  Heels intact/not altered? Yes  Surgical wound? No    CHECK ONE!   (no altered skin or altered skin) and sub boxes:  [] No Altered Skin Integrity Present    []Prevention Measures Documented    [x] Altered Skin Integrity Present or Discovered   [x] LDA already present in EPIC, daily doc completed              [] LDA added if not already in EPIC (describe/stage wound).               [] Wound Image Taken (required on admit,                   transfer/discharge and every Tuesday)    Wound Care Consulted? Yes   Bowel Function no issues    Indwelling Catheter Necessity            De-escalation Antibiotics Yes        VS and assessment per flow sheet, patient progressing towards goals as tolerated, plan of care reviewed with [unfilled] and family, all concerns addressed, will continue to monitor.

## 2025-06-20 NOTE — PT/OT/SLP PROGRESS
Physical Therapy      Patient Name:  Jordin Allen Jr.   MRN:  [id number]    PT order received. Patient not seen today secondary to continuous BIPAP and frequent PVCs. SALO Spangler suggesting pt rests for the day due to lethargy - PT in agreement to hold. Will follow-up for evaluation when appropriate.    Nanci Walker PT  6/20/25

## 2025-06-20 NOTE — PLAN OF CARE
06/20/25 1700   Post-Acute Status   Post-Acute Authorization Placement   Post-Acute Placement Status Referrals Sent   Discharge Delays None known at this time   Discharge Plan   Discharge Plan A Long-term acute care facility (LTAC)   Discharge Plan B Long-term acute care facility (LTAC)     Simeon met with pt and family at bedside. Pt is not medically ready at this time. Per pt, he will be stepping down and moving to another room. Pt agreeable to return to [facility] once medically ready.    Discharge Plan A and Plan B have been determined by review of patient's clinical status, future medical and therapeutic needs, and coverage/benefits for post-acute care in coordination with multidisciplinary team members.    SIMEON Negron  Case Management  [phone]

## 2025-06-20 NOTE — HOSPITAL COURSE
79-year-old male with a history of COPD on home O? (4L), SHOLA, CAD s/p CABG, atrial fibrillation, and stage IV NSCLC with brain metastases, who was transferred from LTAC for worsening shortness of breath, chest pain, and tachycardia.  On arrival to the ED, he was in AFib with RVR (HR ~170s) and acute on chronic hypoxic respiratory failure, requiring escalation to 15L NRB, then BiPAP. He had minimal response to IV Diltiazem and became hypotensive, necessitating step-up to MICU and initiation of Amiodarone drip on 6/19. Rate control was achieved, and he was transitioned to PO Amiodarone on 6/20. He was anticoagulated with Heparin drip, later transitioned to Apixaban.  Infectious workup showed leukocytosis and imaging consistent with basilar airspace disease and ground-glass opacities, raising concern for aspiration pneumonia. He was treated with Vancomycin, Cefepime, and Levofloxacin, with subsequent de-escalation to Levofloxacin monotherapy. Cultures and respiratory viral panel were negative. Steroids and scheduled bronchodilators were continued for concurrent COPD exacerbation.  Respiratory status improved steadily he was weaned to HFNC, then to 4L nasal cannula, and remained stable on room air at rest by day 3. He continued to require BiPAP at night for chronic SHOLA/COPD overlap. Psychiatry was consulted for anxiety; no pharmacologic intervention was required.  PT/OT evaluated the patient and recommended moderate-intensity physical activity as tolerated. He has demonstrated adequate strength and endurance for return to LTAC with continued therapy. Oncology and Pulmonary teams were updated, and is appropriate for likely discharge back to LTAC tomorrow pending respiratory stability and LTAC logistics clarity.

## 2025-06-20 NOTE — EICU
Intervention Initiated From:  [facility] / EICU    Ramon intervened regarding:  Rounding (Video assessment)    Comments: Virtual ICU Quality Rounds    Admit Date: 6/18/2025  Hospital Day: 2    ICU Day: 1d 20h    24H Vital Sign Range:  Temp:  [98.2 °F (36.8 °C)-98.6 °F (37 °C)]   Pulse:  []   Resp:  [13-28]   BP: ()/(54-84)   SpO2:  [89 %-100 %]     VICU Surveillance Screening    LDA reconciliation : Yes

## 2025-06-20 NOTE — SUBJECTIVE & OBJECTIVE
Scheduled Meds:   atorvastatin  80 mg Oral Daily    ceFEPime IV (PEDS and ADULTS)  2 g Intravenous Q8H    ipratropium  0.5 mg Nebulization Q4H    levalbuterol  1.25 mg Nebulization Q4H    levETIRAcetam  500 mg Oral BID    levoFLOXacin  750 mg Intravenous Q24H    methylPREDNISolone injection (PEDS and ADULTS)  40 mg Intravenous Daily    mupirocin   Nasal BID    pantoprazole  40 mg Oral Daily     Continuous Infusions:   amiodarone in dextrose 5%  0.5 mg/min Intravenous Continuous 16.7 mL/hr at 06/20/25 0600 0.5 mg/min at 06/20/25 0600    heparin (porcine) in D5W  0-40 Units/kg/hr Intravenous Continuous 10.9 mL/hr at 06/20/25 0600 12.018 Units/kg/hr at 06/20/25 0600     PRN Meds:  Current Facility-Administered Medications:     acetaminophen, 650 mg, Oral, Q8H PRN    dextrose 50%, 12.5 g, Intravenous, PRN    dextrose 50%, 25 g, Intravenous, PRN    glucagon (human recombinant), 1 mg, Intramuscular, PRN    glucose, 16 g, Oral, PRN    glucose, 24 g, Oral, PRN    heparin (PORCINE), 60 Units/kg, Intravenous, PRN    heparin (PORCINE), 30 Units/kg, Intravenous, PRN    naloxone, 0.02 mg, Intravenous, PRN    senna-docusate, 1 tablet, Oral, Daily PRN    sodium chloride 0.9%, 10 mL, Intravenous, Q12H PRN    Review of patient's allergies indicates:   Allergen Reactions    Brilinta [ticagrelor] Itching    Lisinopril      Other reaction(s): cough    Metoprolol succinate Rash        Past Medical History:   Diagnosis Date    Benign neoplasm of colon 10/01/2013    Bronchitis chronic     CAD (coronary artery disease) 10/31/2013    Cataract 2008    COPD (chronic obstructive pulmonary disease)     Emphysema of lung     Encounter for preventive health examination 03/21/2018    GERD (gastroesophageal reflux disease)     Glaucoma 2010    Hearing loss 2015    Heart attack 2013    Hx of colonic polyps     Hypertension     NAFLD (nonalcoholic fatty liver disease) 03/27/2017    SHOLA on CPAP     RLS (restless legs syndrome)     Screen for colon  cancer 2013     Past Surgical History:   Procedure Laterality Date    bilateral foot surgery      CARDIAC CATHETERIZATION  2013    x1    CATARACT EXTRACTION, BILATERAL      COLON SURGERY      Ace Mott MD    COLONOSCOPY N/A 2018    Procedure: COLONOSCOPY;  Surgeon: Terry Mott MD;  Location: [facility] ENDO (4TH FLR);  Service: Endoscopy;  Laterality: N/A;    COLONOSCOPY N/A 2019    Procedure: COLONOSCOPY;  Surgeon: John Mantilla MD;  Location: [facility] ENDO (4TH FLR);  Service: Endoscopy;  Laterality: N/A;    COLONOSCOPY N/A 2022    Procedure: COLONOSCOPY;  Surgeon: MEDINA Farris MD;  Location: [facility] ENDO (4TH FLR);  Service: Endoscopy;  Laterality: N/A;  fully vacc-inst portal-tb    ENDOBRONCHIAL ULTRASOUND Bilateral 2024    Procedure: ENDOBRONCHIAL ULTRASOUND (EBUS);  Surgeon: Naveed Aguero MD;  [facility];  Service: Pulmonary;  Laterality: Bilateral;    EYE SURGERY      scrotal abscess removal      TYMPANOSTOMY TUBE PLACEMENT         Family History       Problem Relation (Age of Onset)    Diabetes Maternal Grandmother    Heart attack Mother    Heart disease Mother    Hypertension Mother    No Known Problems Sister, Daughter          Tobacco Use    Smoking status: Former     Current packs/day: 0.00     Average packs/day: 1 pack/day for 40.0 years (40.0 ttl pk-yrs)     Types: Cigarettes     Start date: 8/15/1972     Quit date: 8/15/2012     Years since quittin.8     Passive exposure: Never    Smokeless tobacco: Never   Substance and Sexual Activity    Alcohol use: Yes     Alcohol/week: 3.0 standard drinks of alcohol     Types: 3 Cans of beer per week     Comment: maybe 2-3  beers weekly    Drug use: No    Sexual activity: Yes     Partners: Female     Review of Systems   Skin:  Positive for wound.       Objective:     Vital Signs (Most Recent):  Temp: 98.4 °F (36.9 °C) (25 0721)  Pulse: 80 (25 1100)  Resp: 17 (25 1100)  BP: 139/68  (06/20/25 1100)  SpO2: (!) 94 % (06/20/25 1100) Vital Signs (24h Range):  Temp:  [98.2 °F (36.8 °C)-98.6 °F (37 °C)] 98.4 °F (36.9 °C)  Pulse:  [] 80  Resp:  [13-28] 17  SpO2:  [89 %-100 %] 94 %  BP: ()/(54-84) 139/68     Weight: 87.1 kg (192 lb 0.3 oz)  Body mass index is 28.36 kg/m².     Physical Exam  Constitutional:       Appearance: Normal appearance.   Skin:     General: Skin is warm and dry.      Findings: Lesion present.   Neurological:      Mental Status: He is alert.          Laboratory:  All pertinent labs reviewed within the last 24 hours.    Diagnostic Results:  None

## 2025-06-20 NOTE — PROGRESS NOTES
"[facility] - Medical ICU  Critical Care Medicine  Progress Note    Patient Name: Jordin Allen Jr.  MRN: [id number]  Admission Date: 6/18/2025  Hospital Length of Stay: 2 days  Code Status: DNR  Attending Provider: Theo Lucia MD  Primary Care Provider: Sierra Landry MD   Principal Problem: Acute on chronic respiratory failure with hypoxia    Subjective:     HPI:  Jordin Allen Jr (Ray) is a 79 year old male with a PMHx COPD on 4L baseline, SHOLA, HTN, CAD, left lung adenocarcinoma with brain mets s/p chemo/RT/immuno (12/24), GERD, HLD presented to [facility] from LTAC with worsening shortness of breath since recent discharge from [facility]. He was admitted to Hospital Medicine for acute hypoxic respiratory failure however shortly after he was admitted to MICU for increasing work of breathing required BiPAP and A fib w/ RVR not responsive to IV Diltiazem.    He was recently admitted to [facility] for similar presentation and was treated for parainfluenza pneumonia and COPD exacerbation. On route, he became hypotensive and was electrically cardioverted, with only transient slowing of rate. Notable for multiple admissions (3) in the last month for similar presentations.     In the ED, he was AF. 170s bpm. Labs were notable for leukocytosis 17.04 with left shift. Hgb 11.7. K 3.3. Phosphorus 1.8. BNP and Trop wnl. Lactate normal. VBG 7.471/38.7/32.9/27.7. He was started on 15L non breather and transitioned to BiPAP and weaned to HFNC 60L but BiPAP was restarted with concern for increased WOB despite VBG showing some improvement and O2 sats in the 90s. HR had minimal response to IV Diltiazem. CXR with similar perihilar and lower lobe infiltrates within both lungs. CT chest w/o contrast with interval development of bilateral small pleural effusion and R and L ground glass opacities concerning for progression of infectious processes. Pulmonology was consulted for recurrent COPD. He was started on Cefepime, Vancomycin, Levaquin and " IV Solumedrol for PNA and COPD exacerbation.     Admitted to MICU for acute respiratory failure and Afib w/RVR.     History obtained through chart review and patient.        Hospital/ICU Course:  Admitted to MICU for ARF and Afib w RVR. Received IV dilt and was started on Bipap on 6/18. Became hypotensive with Dilt and was started on  Amio gtt on 6/19 to HR in the 150's. Transitioned from Bipapr to NC 3L on 6/19. Started on Vanc, Cefepime, and Levoquin and Steroids.  Vanc was stopped on 6/19. RIP negative and bcx NGTD. CT imaging notable for basilar airspace opacities w concerns for aspiration/PNA. De-escalated abx to Levaquin to complete course of tx and has been transition to PO eliquis from heparin gtt and PO amio on 6/20. Stable to  stepdown to HM.         Interval History/Significant Events: NAEON, used Bipap at night and for naps. Transition back to NC this am, reports not being able to sleep much and would like melatonin. De-escalate abx to levaquin only and amio gtt to PO amio.     Review of Systems  Objective:     Vital Signs (Most Recent):  Temp: 98.4 °F (36.9 °C) (06/20/25 0721)  Pulse: 89 (06/20/25 1130)  Resp: (!) 30 (06/20/25 1130)  BP: 139/68 (06/20/25 1100)  SpO2: 98 % (06/20/25 1130) Vital Signs (24h Range):  Temp:  [98.2 °F (36.8 °C)-98.6 °F (37 °C)] 98.4 °F (36.9 °C)  Pulse:  [] 89  Resp:  [13-30] 30  SpO2:  [89 %-100 %] 98 %  BP: ()/(60-84) 139/68   Weight: 87.1 kg (192 lb 0.3 oz)  Body mass index is 28.36 kg/m².      Intake/Output Summary (Last 24 hours) at 6/20/2025 1504  Last data filed at 6/20/2025 1112  Gross per 24 hour   Intake 1282.65 ml   Output 575 ml   Net 707.65 ml          Physical Exam  Vitals and nursing note reviewed.   Constitutional:       General: He is not in acute distress.     Appearance: He is ill-appearing. He is not toxic-appearing.   HENT:      Head: Normocephalic and atraumatic.   Eyes:      General: No scleral icterus.        Right eye: No discharge.          Left eye: No discharge.      Extraocular Movements: Extraocular movements intact.      Conjunctiva/sclera: Conjunctivae normal.      Pupils: Pupils are equal, round, and reactive to light.   Cardiovascular:      Rate and Rhythm: Tachycardia present. Rhythm irregular.      Pulses: Normal pulses.      Heart sounds: Normal heart sounds. No murmur heard.  Pulmonary:      Effort: Respiratory distress present.      Breath sounds: No stridor. Rhonchi present. No wheezing or rales.      Comments: On BiPAP  Chest:      Chest wall: No tenderness.   Abdominal:      General: Abdomen is flat. There is no distension.      Palpations: Abdomen is soft. There is no mass.      Tenderness: There is no abdominal tenderness.      Hernia: No hernia is present.   Musculoskeletal:      Right lower leg: No edema.      Left lower leg: No edema.   Skin:     General: Skin is warm.      Coloration: Skin is not jaundiced or pale.      Findings: No erythema or rash.   Neurological:      Mental Status: He is alert and oriented to person, place, and time.   Psychiatric:         Mood and Affect: Mood normal.         Behavior: Behavior normal.            Vents:  Oxygen Concentration (%): 50 (06/20/25 0701)  Lines/Drains/Airways       Peripheral Intravenous Line  Duration                  Midline Catheter - Single Lumen 06/14/25 1245 Left basilic vein (medial side of arm) other (see comments) 6 days    Peripheral IV Single Lumen 06/19/25 0854 20 G 1 3/4 in Anterior;Right Forearm 1 day    Peripheral IV 06/19/25 2111 22 G Right Hand <1 day                  Significant Labs:    CBC/Anemia Profile:  Recent Labs   Lab 06/19/25  0440 06/20/25  0338   WBC 8.86 12.83*   HGB 8.8* 8.9*   HCT 27.4* 27.3*    206   MCV 88 87   RDW 16.6* 16.8*        Chemistries:  Recent Labs   Lab 06/19/25  0440 06/20/25  0338    139   K 3.9 3.4*    103   CO2 26 26   BUN 26* 34*   CREATININE 0.9 1.2   CALCIUM 8.5* 8.7   ALBUMIN 1.7* 1.7*   PROT 5.0* 5.2*    BILITOT 0.4 0.3   ALKPHOS 94 99   ALT 48* 68*   AST 32 50*   MG 1.9 2.1   PHOS 3.3 2.6*       BMP:   Recent Labs   Lab 06/20/25  0338   *      K 3.4*      CO2 26   BUN 34*   CREATININE 1.2   CALCIUM 8.7   MG 2.1     CMP:   Recent Labs   Lab 06/19/25  0440 06/20/25  0338    139   K 3.9 3.4*    103   CO2 26 26   * 175*   BUN 26* 34*   CREATININE 0.9 1.2   CALCIUM 8.5* 8.7   PROT 5.0* 5.2*   ALBUMIN 1.7* 1.7*   BILITOT 0.4 0.3   ALKPHOS 94 99   AST 32 50*   ALT 48* 68*   ANIONGAP 8 10     All pertinent labs within the past 24 hours have been reviewed.    Significant Imaging:  I have reviewed all pertinent imaging results/findings within the past 24 hours.    ABG  Recent Labs   Lab 06/18/25  1339   PH 7.471*   PO2 32.9*   PCO2 38.7   HCO3 27.7     Assessment/Plan:     Psychiatric  Anxiety  - was started on Precedex for a short period while in the ED due to anxiety, restart home meds when appropriate       Pulmonary  * Acute on chronic respiratory failure with hypoxia  79 year old male with a PMHx COPD on 4L baseline, SHOLA, HTN, CAD, left lung adenocarcinoma with brain mets s/p chemo/RT/immuno (12/24), GERD, HLD presented to [facility] from LTAC with worsening shortness of breath. In the ED, he was AF. 170s bpm. Labs were notable for leukocytosis 17.04 with left shift. Hgb 11.7. K 3.3. Phosphorus 1.8. BNP and Trop wnl. Lactate normal. VBG 7.471/38.7/32.9/27.7. He was started on 15L non breather and transitioned to BiPAP and weaned to HFNC 60L but BiPAP was restarted with concern for increased WOB despite VBG showing some improvement and O2 sats in the 90s. HR had minimal response to IV Diltiazem. CXR with similar perihilar and lower lobe infiltrates within both lungs. CT chest w/o contrast with interval development of bilateral small pleural effusion and R and L ground glass opacities concerning for progression of infectious processes.    Patient with Hypoxic Respiratory failure which is  Acute on chronic.  he is on home oxygen at 4 LPM. Supplemental oxygen was provided and noted- Oxygen Concentration (%):  [40-60] 50    .   Signs/symptoms of respiratory failure include- tachypnea, increased work of breathing, respiratory distress, and use of accessory muscles. Contributing possible diagnoses includes - COPD, Pleural effusion, and severe Pneumonia Labs and images were reviewed. Patient Has recent ABG, which has been reviewed. Will treat underlying causes and adjust management of respiratory failure as follows-     - F/u Bcx  NGTD   - Resp cx pending   - BNP 62, Lac of 1.2  - RIP negative   - Procal .71     - CTA chest with ill-defined airspace opacities in the right upper and middle lobes when compared to previous on 5/22/25, which could indicate  aspiration, Pna.   - Continue levalbuterol and home inhaler   - Continue abx with cefepime, and Levoquin and de-escalate as clinically appropriate. Vanc stopped on 6/19, Cefepime on 6/20. Will continue Levaquin to finish course of tx.    - Continue Bipap qhs and continue to monitor respiratory status and repeat abg as needed. Transitioned to NC on 6/19 with Bipap qhs     COPD with acute exacerbation    Patient with a history of COPD On Symbicort Spiriva and Daliresp at home.  Follows up with pulmonology outpatient.   Presents with continued dyspnea, use of accessory muscles for breathing, and worsening of baseline hypoxia currently.  Patient is currently on COPD Pathway. Continue scheduled inhalers Steroids, Antibiotics, and Supplemental oxygen and monitor respiratory status closely.   - Continue home inhalers   - Continue Levabuterol while inpatient, given that albuterol can cause tachycardia           Cardiac/Vascular  Dyslipidemia  Continue Atorvastatin     CAD (coronary artery disease)  Patient with known history of  CAD s/p stent placement and CABG, which is controlled Will continue Statin and monitor for S/Sx of angina/ACS. Continue to monitor on  telemetry.     Atrial fibrillation with RVR  Patient with mix of irregular and regular SVT concerning for MAT vs afib w rvr cannot be certain based on ECG and telemetry. This has occurred in the setting of acute on chronic hypoxic respiratory failure in the setting of pneumonia vs COPD exacerbation. He has a recent normal TTE and an allergy to metoprolol. Has a history of documented suspected Aflutter back on June 9-11 which he converted to sinus rhythm with PACs after IV diltiazem and started on PO Dilt at home.      Recommendations:  - continue telemetry  - daily ECG as needed   - K > 4, Mg > 2  - Received IV Dilt in the ED   - recommend AC given CHADSVASc of 4 , continue home eliquis   - Consider starting Amiodarone if patient rate remains uncontrolled since he became hypotensive with Dilt   - Amio gtt started on 6/19 due to persistent uncontrolled rate  , transition to PO on 6/20   - hep gtt transition to eliquis on 6/20             Oncology  Metastasis to brain   IV NSCLC (cT3 cN1 M1b) adenocarcinoma  diagnosed on EBUS 4/30/24 s/p definitive chemo-RT to 60 Gy in 30 fx completed 6/18/24 with Dr. Deng. Staging MRI Brain 4/3/24 demonstrated a 0.8 cm Left thalamic metastasis; this was observed. MRI Brain 7/26/24 demonstrated interval increase in size to 2.2 cm; no other evidence of intracranial disease. He completed GK SRT 27 Gy in 3 fx on 8/7/24. MRI Brain 10/7/24 demonstrated new 1.9 cm Right medial temporo-occipital metastasis. He completed GK SRS 20 Gy x1 to Rt temporal metastasis 10/11/24. MRI Brain 1/24/25 demonstrated a 0.4 cm posterior Right temporal lobe met. This was treated 22 Gy x1 on 1/31/25.      MRI Brain 3/28/25 with improved edema in Right temporal lobe and stable treated enhancing lesion in the Right temporal lobe. No evidence of new/recurrent intracranial disease.   - recently on dexamethasone taper, but stopped taking on 05/12  - following with [facility] neuro onc    Adenocarcinoma, lung,  left  Stage IV NSCLC (cT3 cN1 M1b) adenocarcinoma Completed treatment with chemo/XRT. Brain XRT for lesionsx2. off immunotherapy since 12/24. Follows with Dr. Ponce outpatient       GI  GERD (gastroesophageal reflux disease)  - Continue PPI          Critical Care Daily Checklist:    A: Awake: RASS Goal/Actual Goal:    Actual:     B: Spontaneous Breathing Trial Performed?     C: SAT & SBT Coordinated?  NA                      D: Delirium: CAM-ICU     E: Early Mobility Performed? Yes   F: Feeding Goal: Goals: meet % een/epn by next RD f/u  Status: Nutrition Goal Status: new   Current Diet Order   Procedures    Diet Soft & Bite Sized (IDDSI Level 6)      AS: Analgesia/Sedation NA   T: Thromboembolic Prophylaxis Eliquis   H: HOB > 300 Yes   U: Stress Ulcer Prophylaxis (if needed) PPI   G: Glucose Control controlled   B: Bowel Function Unmeasured Stool Occurrence: 1   I: Indwelling Catheter (Lines & Hernandez) Necessity PIV   D: De-escalation of Antimicrobials/Pharmacotherapies Levaquin only    Plan for the day/ETD SD to      Code Status:  Family/Goals of Care: DNR         Critical secondary to Patient has a condition that poses threat to life and bodily function: Severe Respiratory Distress      Critical care was time spent personally by me on the following activities: development of treatment plan with patient or surrogate and bedside caregivers, discussions with consultants, evaluation of patient's response to treatment, examination of patient, ordering and performing treatments and interventions, ordering and review of laboratory studies, ordering and review of radiographic studies, pulse oximetry, re-evaluation of patient's condition. This critical care time did not overlap with that of any other provider or involve time for any procedures.     Marcus Davidson MD  Critical Care Medicine  [facility] - Medical ICU

## 2025-06-20 NOTE — PT/OT/SLP PROGRESS
Occupational Therapy      Patient Name:  oJrdin Allen Jr.   MRN:  [id number]    OT orders received. Chart reviewed. RN consulted. Patient not seen today secondary to RN hold/therapist assessment. Patient noted with increased lethargy, as well as on continuous BiPAP at this time. Will follow-up at next available date, as patient medically appropriate for participation in therapy.    6/20/2025

## 2025-06-21 LAB
ABSOLUTE EOSINOPHIL (OHS): 0 K/UL
ABSOLUTE MONOCYTE (OHS): 0.69 K/UL (ref 0.3–1)
ABSOLUTE NEUTROPHIL COUNT (OHS): 9.09 K/UL (ref 1.8–7.7)
ALBUMIN SERPL BCP-MCNC: 1.9 G/DL (ref 3.5–5.2)
ALP SERPL-CCNC: 100 UNIT/L (ref 40–150)
ALT SERPL W/O P-5'-P-CCNC: 84 UNIT/L (ref 10–44)
ANION GAP (OHS): 9 MMOL/L (ref 8–16)
AST SERPL-CCNC: 53 UNIT/L (ref 11–45)
BASOPHILS # BLD AUTO: 0.02 K/UL
BASOPHILS NFR BLD AUTO: 0.2 %
BILIRUB SERPL-MCNC: 0.4 MG/DL (ref 0.1–1)
BUN SERPL-MCNC: 41 MG/DL (ref 8–23)
CALCIUM SERPL-MCNC: 8.9 MG/DL (ref 8.7–10.5)
CHLORIDE SERPL-SCNC: 105 MMOL/L (ref 95–110)
CO2 SERPL-SCNC: 27 MMOL/L (ref 23–29)
CREAT SERPL-MCNC: 1.2 MG/DL (ref 0.5–1.4)
ERYTHROCYTE [DISTWIDTH] IN BLOOD BY AUTOMATED COUNT: 17 % (ref 11.5–14.5)
GFR SERPLBLD CREATININE-BSD FMLA CKD-EPI: >60 ML/MIN/1.73/M2
GLUCOSE SERPL-MCNC: 130 MG/DL (ref 70–110)
HCT VFR BLD AUTO: 27.3 % (ref 40–54)
HGB BLD-MCNC: 8.9 GM/DL (ref 14–18)
IMM GRANULOCYTES # BLD AUTO: 0.48 K/UL (ref 0–0.04)
IMM GRANULOCYTES NFR BLD AUTO: 4.5 % (ref 0–0.5)
LYMPHOCYTES # BLD AUTO: 0.32 K/UL (ref 1–4.8)
MAGNESIUM SERPL-MCNC: 2.2 MG/DL (ref 1.6–2.6)
MCH RBC QN AUTO: 28.6 PG (ref 27–31)
MCHC RBC AUTO-ENTMCNC: 32.6 G/DL (ref 32–36)
MCV RBC AUTO: 88 FL (ref 82–98)
NUCLEATED RBC (/100WBC) (OHS): 0 /100 WBC
PHOSPHATE SERPL-MCNC: 2.1 MG/DL (ref 2.7–4.5)
PLATELET # BLD AUTO: 209 K/UL (ref 150–450)
PMV BLD AUTO: 10.3 FL (ref 9.2–12.9)
POTASSIUM SERPL-SCNC: 4.1 MMOL/L (ref 3.5–5.1)
PROT SERPL-MCNC: 5.1 GM/DL (ref 6–8.4)
RBC # BLD AUTO: 3.11 M/UL (ref 4.6–6.2)
RELATIVE EOSINOPHIL (OHS): 0 %
RELATIVE LYMPHOCYTE (OHS): 3 % (ref 18–48)
RELATIVE MONOCYTE (OHS): 6.5 % (ref 4–15)
RELATIVE NEUTROPHIL (OHS): 85.8 % (ref 38–73)
SODIUM SERPL-SCNC: 141 MMOL/L (ref 136–145)
WBC # BLD AUTO: 10.6 K/UL (ref 3.9–12.7)

## 2025-06-21 PROCEDURE — 99900035 HC TECH TIME PER 15 MIN (STAT)

## 2025-06-21 PROCEDURE — 25000003 PHARM REV CODE 250

## 2025-06-21 PROCEDURE — 25000242 PHARM REV CODE 250 ALT 637 W/ HCPCS

## 2025-06-21 PROCEDURE — 27100171 HC OXYGEN HIGH FLOW UP TO 24 HOURS

## 2025-06-21 PROCEDURE — 83735 ASSAY OF MAGNESIUM: CPT | Performed by: STUDENT IN AN ORGANIZED HEALTH CARE EDUCATION/TRAINING PROGRAM

## 2025-06-21 PROCEDURE — 63600175 PHARM REV CODE 636 W HCPCS: Performed by: EMERGENCY MEDICINE

## 2025-06-21 PROCEDURE — 97530 THERAPEUTIC ACTIVITIES: CPT

## 2025-06-21 PROCEDURE — 80053 COMPREHEN METABOLIC PANEL: CPT | Performed by: STUDENT IN AN ORGANIZED HEALTH CARE EDUCATION/TRAINING PROGRAM

## 2025-06-21 PROCEDURE — 94799 UNLISTED PULMONARY SVC/PX: CPT

## 2025-06-21 PROCEDURE — 18500000 HC LEAVE OF ABSENCE HOSPITAL SERVICES

## 2025-06-21 PROCEDURE — 94660 CPAP INITIATION&MGMT: CPT

## 2025-06-21 PROCEDURE — 97165 OT EVAL LOW COMPLEX 30 MIN: CPT

## 2025-06-21 PROCEDURE — 85025 COMPLETE CBC W/AUTO DIFF WBC: CPT | Performed by: STUDENT IN AN ORGANIZED HEALTH CARE EDUCATION/TRAINING PROGRAM

## 2025-06-21 PROCEDURE — 94640 AIRWAY INHALATION TREATMENT: CPT

## 2025-06-21 PROCEDURE — 94761 N-INVAS EAR/PLS OXIMETRY MLT: CPT

## 2025-06-21 PROCEDURE — 20600001 HC STEP DOWN PRIVATE ROOM

## 2025-06-21 PROCEDURE — 25000242 PHARM REV CODE 250 ALT 637 W/ HCPCS: Performed by: STUDENT IN AN ORGANIZED HEALTH CARE EDUCATION/TRAINING PROGRAM

## 2025-06-21 PROCEDURE — 97162 PT EVAL MOD COMPLEX 30 MIN: CPT

## 2025-06-21 PROCEDURE — 97535 SELF CARE MNGMENT TRAINING: CPT

## 2025-06-21 PROCEDURE — 84100 ASSAY OF PHOSPHORUS: CPT | Performed by: STUDENT IN AN ORGANIZED HEALTH CARE EDUCATION/TRAINING PROGRAM

## 2025-06-21 RX ADMIN — AMIODARONE HYDROCHLORIDE 400 MG: 200 TABLET ORAL at 08:06

## 2025-06-21 RX ADMIN — SODIUM PHOSPHATE, MONOBASIC, MONOHYDRATE AND SODIUM PHOSPHATE, DIBASIC, ANHYDROUS 15 MMOL: 142; 276 INJECTION, SOLUTION INTRAVENOUS at 06:06

## 2025-06-21 RX ADMIN — LEVALBUTEROL 1.25 MG: 1.25 SOLUTION, CONCENTRATE RESPIRATORY (INHALATION) at 04:06

## 2025-06-21 RX ADMIN — IPRATROPIUM BROMIDE 0.5 MG: 0.5 SOLUTION RESPIRATORY (INHALATION) at 04:06

## 2025-06-21 RX ADMIN — IPRATROPIUM BROMIDE 0.5 MG: 0.5 SOLUTION RESPIRATORY (INHALATION) at 09:06

## 2025-06-21 RX ADMIN — AMIODARONE HYDROCHLORIDE 400 MG: 200 TABLET ORAL at 09:06

## 2025-06-21 RX ADMIN — APIXABAN 5 MG: 5 TABLET, FILM COATED ORAL at 08:06

## 2025-06-21 RX ADMIN — LEVALBUTEROL 1.25 MG: 1.25 SOLUTION, CONCENTRATE RESPIRATORY (INHALATION) at 03:06

## 2025-06-21 RX ADMIN — PREDNISONE 40 MG: 20 TABLET ORAL at 09:06

## 2025-06-21 RX ADMIN — APIXABAN 5 MG: 5 TABLET, FILM COATED ORAL at 09:06

## 2025-06-21 RX ADMIN — LEVETIRACETAM 500 MG: 500 TABLET, FILM COATED ORAL at 09:06

## 2025-06-21 RX ADMIN — IPRATROPIUM BROMIDE 0.5 MG: 0.5 SOLUTION RESPIRATORY (INHALATION) at 07:06

## 2025-06-21 RX ADMIN — LEVALBUTEROL 1.25 MG: 1.25 SOLUTION, CONCENTRATE RESPIRATORY (INHALATION) at 09:06

## 2025-06-21 RX ADMIN — LEVALBUTEROL 1.25 MG: 1.25 SOLUTION, CONCENTRATE RESPIRATORY (INHALATION) at 12:06

## 2025-06-21 RX ADMIN — Medication 6 MG: at 08:06

## 2025-06-21 RX ADMIN — MUPIROCIN: 20 OINTMENT TOPICAL at 08:06

## 2025-06-21 RX ADMIN — PANTOPRAZOLE SODIUM 40 MG: 40 TABLET, DELAYED RELEASE ORAL at 09:06

## 2025-06-21 RX ADMIN — LEVETIRACETAM 500 MG: 500 TABLET, FILM COATED ORAL at 08:06

## 2025-06-21 RX ADMIN — ATORVASTATIN CALCIUM 80 MG: 40 TABLET, FILM COATED ORAL at 09:06

## 2025-06-21 RX ADMIN — TRAZODONE HYDROCHLORIDE 25 MG: 50 TABLET ORAL at 08:06

## 2025-06-21 RX ADMIN — LEVALBUTEROL 1.25 MG: 1.25 SOLUTION, CONCENTRATE RESPIRATORY (INHALATION) at 07:06

## 2025-06-21 RX ADMIN — IPRATROPIUM BROMIDE 0.5 MG: 0.5 SOLUTION RESPIRATORY (INHALATION) at 12:06

## 2025-06-21 RX ADMIN — LEVOFLOXACIN 750 MG: 5 INJECTION, SOLUTION INTRAVENOUS at 11:06

## 2025-06-21 RX ADMIN — IPRATROPIUM BROMIDE 0.5 MG: 0.5 SOLUTION RESPIRATORY (INHALATION) at 03:06

## 2025-06-21 NOTE — PT/OT/SLP EVAL
Occupational Therapy   Evaluation    Name: Jordin Allen Jr.  MRN: [id number]  Admitting Diagnosis: Acute on chronic respiratory failure with hypoxia  Pt with lung CA with METS to brain.  Pt was d/c to LTAC 6/11/25 with parainfluenza and COPD exacerbation. Pt then t/f to [facility] with ARF and hypoxia with A-fib and RVR.     Recommendations:     Discharge Recommendations: Moderate Intensity Therapy  Discharge Equipment Recommendations:     Barriers to discharge:       Assessment:     Jordin Allen Jr. is a 79 y.o. male with a medical diagnosis of Acute on chronic respiratory failure with hypoxia.  Performance deficits affecting function: weakness, impaired endurance, impaired self care skills, impaired functional mobility, gait instability, impaired balance.  Pt tolerated session well with good effort and performance; however, pt limited by progressive SOB throughout session.   Pt to benefit from cont OT to address stated goals.     Rehab Prognosis: Fair; patient would benefit from acute skilled OT services to address these deficits and reach maximum level of function.       Plan:     Patient to be seen 4 x/week to address the above listed problems via self-care/home management, therapeutic activities, therapeutic exercises  Plan of Care Expires:    Plan of Care Reviewed with: patient    Subjective     Pt agreeable to therapy       Occupational Profile:  Pt recently moved out of his home to stay with his daughter. She lives in 2 story home, but he has bed/bath on first floor. Home has small threshold to enter.  He has access to walk in shower with shower chair. He initially was using SPC and recently started using RW.   Pt's daughter works daily, but her spouse works from home and can assist as needed.   Pt reports progressive functional decline and states he has not ambulated in 2 weeks due to fatigue/weakness and SOB. PRior to that he was able to complete short distance ambulation and basic ADL skills without assist.      Pain/Comfort:  Pain Rating 1: 6/10  Location - Side 1: Left  Location 1: second toe  Pain Addressed 1: Reposition, Distraction    Patients cultural, spiritual, Roman [employer] conflicts given the current situation: no    Objective:     Communicated with: nsg prior to session.  Patient found in bed with 4 LPM, tele intact.   Coeval completed this date to optimize functional performance and safety given impaired tolerance for activity.   General Precautions: Standard,  fall    Occupational Performance:    Bed Mobility:    Supine>sit with SBA  Sit>supine with MIN A   All bed mobility transitions completed slowly with increased time.     Functional Mobility/Transfers:  Sit>stand with MOD A x 2 with RW. Pt able to fully clear buttocks from bed, but unable to  stand fully upright despite cues.       Activities of Daily Living:  Pt demo set-up self feeding  Set-up seated g/h skills; unable to complete task in standing due to impaired standing balance/endurance.   UE dressing; MAX A   LE dressing; TOTAL A    Cognitive/Visual Perceptual:  Pt awake, alert and following commands. Pt is [place]. Pt cooperative     Physical Exam:  Pt demo WFL UE strength/ROM, coordination and sensation.       AMPAC 6 Click ADL:  AMPAC Total Score: 11    Treatment & Education:  Pt demo SBA for postural control seated EOB. Pt with rounded shoulders and cervical flexion, but able to achieve full upright posture without assist and able to maintain for 10-15 seconds at a time.   Pt with progressive SOB as session continued. Increased time and rest required between all transitions. Pt demo good breathing technique and energy conservation with saturation remaining 91%.     Education provided re: role of OT and safety with functional mobility/ADL skills.       Patient left HOB elevated with all lines intact and call button in reach    GOALS:   Multidisciplinary Problems       Occupational Therapy Goals          Problem: Occupational Therapy    Goal Priority  Disciplines Outcome Interventions   Occupational Therapy Goal     OT, PT/OT Progressing    Description: Goals to be met by: 7 days 7/4/25     Patient will increase functional independence with ADLs by performing:    Pt to complete UE dressing with MIN A   Pt to complete LE dressing with MOD A   Pt to complete t/f BSC with MIN A   Pt to complete UE HEP to increase functional ROM and strength.   Pt to complete seated g/h skills with supervision.                          History:     Past Medical History:   Diagnosis Date    Benign neoplasm of colon 10/01/2013    Bronchitis chronic     CAD (coronary artery disease) 10/31/2013    Cataract 2008    COPD (chronic obstructive pulmonary disease)     Emphysema of lung     Encounter for preventive health examination 03/21/2018    GERD (gastroesophageal reflux disease)     Glaucoma 2010    Hearing loss 2015    Heart attack 2013    Hx of colonic polyps     Hypertension     NAFLD (nonalcoholic fatty liver disease) 03/27/2017    SHOLA on CPAP     RLS (restless legs syndrome)     Screen for colon cancer 08/30/2013         Past Surgical History:   Procedure Laterality Date    bilateral foot surgery  1993    CARDIAC CATHETERIZATION  2013    x1    CATARACT EXTRACTION, BILATERAL      COLON SURGERY  2013    Ace Mott MD    COLONOSCOPY N/A 03/21/2018    Procedure: COLONOSCOPY;  Surgeon: Terry Mott MD;  Location: [address]);  Service: Endoscopy;  Laterality: N/A;    COLONOSCOPY N/A 04/12/2019    Procedure: COLONOSCOPY;  Surgeon: John Mantilla MD;  Location: [address]);  Service: Endoscopy;  Laterality: N/A;    COLONOSCOPY N/A 05/31/2022    Procedure: COLONOSCOPY;  Surgeon: MEDINA Farris MD;  Location: [address]);  Service: Endoscopy;  Laterality: N/A;  fully vacc-inst portal-tb    ENDOBRONCHIAL ULTRASOUND Bilateral 04/30/2024    Procedure: ENDOBRONCHIAL ULTRASOUND (EBUS);  Surgeon: Naveed Aguero MD;  Location: [facility] OR;  Service: Pulmonary;   Laterality: Bilateral;    EYE SURGERY  2011    scrotal abscess removal      TYMPANOSTOMY TUBE PLACEMENT  2017       Time Tracking:     OT Date of Treatment: 06/21/25  OT Start Time: 1245  OT Stop Time: 1310  OT Total Time (min): 25 min    Billable Minutes:Evaluation 17  Self Care/Home Management 8    6/21/2025

## 2025-06-21 NOTE — ASSESSMENT & PLAN NOTE
Patient's COPD is with exacerbation noted by continued dyspnea, use of accessory muscles for breathing, and worsening of baseline hypoxia currently.  Patient is currently on COPD Pathway. Continue scheduled inhalers Steroids, Antibiotics, and Supplemental oxygen and monitor respiratory status closely.   Recent steroid taper completed--possible new exacerbation      Plan:  - Re-initiate systemic steroids; Hydrocort 50 mg Q6H  - Stop Albuterol and   - Start Levalbutrerol with Ipratropium  - Continue Abx(Levofloxacin)  - Monitor for worsening hypercapnia, repeat ABG  as indicated

## 2025-06-21 NOTE — ASSESSMENT & PLAN NOTE
79 year old male with a PMHx COPD on 4L baseline, SHOLA, HTN, CAD, left lung adenocarcinoma with brain mets s/p chemo/RT/immuno (12/24), GERD, HLD [place] [facility] [place] with worsening shortness of breath. In the ED, he was AF. 170s bpm. Labs were notable for leukocytosis 17.04 with left shift. Hgb 11.7. K 3.3. Phosphorus 1.8. BNP and Trop wnl. Lactate normal. VBG 7.471/38.7/32.9/27.7. He was started on 15L non breather and transitioned to BiPAP and weaned to HFNC 60L but BiPAP was restarted with concern for increased WOB despite VBG showing some improvement and O2 sats in the 90s. HR had minimal response to IV Diltiazem. CXR with similar perihilar and lower lobe infiltrates within both lungs. CT chest w/o contrast with interval development of bilateral small pleural effusion and R and L ground glass opacities concerning for progression of infectious processes.    Patient with Hypoxic Respiratory failure which is Acute on chronic.  he is on home oxygen at 4 LPM. Supplemental oxygen was provided and noted-      .   Signs/symptoms of respiratory failure include- tachypnea, increased work of breathing, respiratory distress, and use of accessory muscles. Contributing possible diagnoses includes - COPD, Pleural effusion, and severe Pneumonia Labs and images were reviewed. Patient Has recent ABG, which has been reviewed. Will treat underlying causes and adjust management of respiratory failure as follows-     - F/u Bcx  NGTD   - Resp cx pending   - BNP 62, Lac of 1.2  - RIP negative   - Procal .71     - CTA chest with ill-defined airspace opacities in the right upper and middle lobes when compared to previous on 5/22/25, which could indicate  aspiration, Pna.   - Continue levalbuterol and home inhaler   - Continue abx with cefepime, and Levoquin and de-escalate as clinically appropriate. Vanc stopped on 6/19, Cefepime on 6/20. Will continue Levaquin to finish course of tx.    - Continue Bipap qhs and continue to monitor  respiratory status and repeat abg as needed. Transitioned to NC on 6/19 with Bipap qhs

## 2025-06-21 NOTE — PROGRESS NOTES
"[facility] - [facility] Stepdown  Critical Care Medicine  Progress Note    Patient Name: Jordin Allen Jr.  MRN: [id number]  Admission Date: 6/18/2025  Hospital Length of Stay: 3 days  Code Status: DNR  Attending Provider: Janice Dominguez MD  Primary Care Provider: Sierra Landry MD   Principal Problem: Acute on chronic respiratory failure with hypoxia    Subjective:     HPI:  Jordin Allen Jr (Ray) is a 79 year old male with a PMHx COPD on 4L baseline, SHOLA, HTN, CAD, left lung adenocarcinoma with brain mets s/p chemo/RT/immuno (12/24), GERD, HLD presented to [facility] [place] with worsening shortness of breath since recent discharge from [facility]. He was admitted to Hospital Medicine for acute hypoxic respiratory failure however shortly after he was admitted to MICU for increasing work of breathing required BiPAP and A fib w/ RVR not responsive to IV Diltiazem.    He was recently admitted to [facility] for similar presentation and was treated for parainfluenza pneumonia and COPD exacerbation. On route, he became hypotensive and was electrically cardioverted, with only transient slowing of rate. Notable for multiple admissions (3) in the last month for similar presentations.     In the ED, he was AF. 170s bpm. Labs were notable for leukocytosis 17.04 with left shift. Hgb 11.7. K 3.3. Phosphorus 1.8. BNP and Trop wnl. Lactate normal. VBG 7.471/38.7/32.9/27.7. He was started on 15L non breather and transitioned to BiPAP and weaned to HFNC 60L but BiPAP was restarted with concern for increased WOB despite VBG showing some improvement and O2 sats in the 90s. HR had minimal response to IV Diltiazem. CXR with similar perihilar and lower lobe infiltrates within both lungs. CT chest w/o contrast with interval development of bilateral small pleural effusion and R and L ground glass opacities concerning for progression of infectious processes. Pulmonology was consulted for recurrent COPD. He was started on Cefepime, Vancomycin, Levaquin " and IV Solumedrol for PNA and COPD exacerbation.     Admitted to MICU for acute respiratory failure and Afib w/RVR.     History obtained through chart review and patient.        Hospital/ICU Course:  Admitted to MICU for ARF and Afib w RVR. Received IV dilt and was started on Bipap on 6/18. Became hypotensive with Dilt and was started on  Amio gtt on 6/19 to HR in the 150's. Transitioned from Bipapr to NC 3L on 6/19. Started on Vanc, Cefepime, and Levoquin and Steroids.  Vanc was stopped on 6/19. RIP negative and bcx NGTD. CT imaging notable for basilar airspace opacities w concerns for aspiration/PNA. De-escalated abx to Levaquin to complete course of tx and has been transition to PO eliquis from heparin gtt and PO amio on 6/20. Stable to  stepdown to HM.         Interval History/Significant Events: NAEON, HR controlled, had a bit better night of sleep and used CPAP. Pending HM SD.       Review of Systems  Objective:     Vital Signs (Most Recent):  Temp: 97.9 °F (36.6 °C) (06/21/25 0700)  Pulse: 79 (06/21/25 0757)  Resp: (!) 26 (06/21/25 0757)  BP: (!) 150/73 (06/21/25 0700)  SpO2: 99 % (06/21/25 0757) Vital Signs (24h Range):  Temp:  [97.9 °F (36.6 °C)-98.4 °F (36.9 °C)] 97.9 °F (36.6 °C)  Pulse:  [] 79  Resp:  [13-42] 26  SpO2:  [89 %-99 %] 99 %  BP: (117-167)/() 150/73   Weight: 87.1 kg (192 lb 0.3 oz)  Body mass index is 28.36 kg/m².      Intake/Output Summary (Last 24 hours) at 6/21/2025 0846  Last data filed at 6/20/2025 1717  Gross per 24 hour   Intake 911.15 ml   Output 700 ml   Net 211.15 ml          Physical Exam  Vitals and nursing note reviewed.   Constitutional:       General: He is not in acute distress.     Appearance: He is ill-appearing. He is not toxic-appearing.   HENT:      Head: Normocephalic and atraumatic.   Eyes:      General: No scleral icterus.        Right eye: No discharge.         Left eye: No discharge.      Extraocular Movements: Extraocular movements intact.       Conjunctiva/sclera: Conjunctivae normal.      Pupils: Pupils are equal, round, and reactive to light.   Cardiovascular:      Rate and Rhythm: Rhythm irregular.      Pulses: Normal pulses.      Heart sounds: Normal heart sounds. No murmur heard.  Pulmonary:      Effort: Respiratory distress present.      Breath sounds: No stridor. Rhonchi present. No wheezing or rales.      Comments: On BiPAP  Chest:      Chest wall: No tenderness.   Abdominal:      General: Abdomen is flat. There is no distension.      Palpations: Abdomen is soft. There is no mass.      Tenderness: There is no abdominal tenderness.      Hernia: No hernia is present.   Musculoskeletal:      Right lower leg: No edema.      Left lower leg: No edema.   Skin:     General: Skin is warm.      Coloration: Skin is not jaundiced or pale.      Findings: No erythema or rash.   Neurological:      Mental Status: He is alert and oriented to person, place, and time.   Psychiatric:         Mood and Affect: Mood normal.         Behavior: Behavior normal.            Vents:  Oxygen Concentration (%): 50 (06/20/25 0701)  Lines/Drains/Airways       Peripheral Intravenous Line  Duration                  Midline Catheter - Single Lumen 06/14/25 1245 Left basilic vein (medial side of arm) other (see comments) 6 days    Peripheral IV 06/19/25 2111 22 G Right Hand 1 day    Peripheral IV Single Lumen 06/19/25 0854 20 G 1 3/4 in Anterior;Right Forearm 1 day                  Significant Labs:    CBC/Anemia Profile:  Recent Labs   Lab 06/20/25  0338 06/21/25  0448   WBC 12.83* 10.60   HGB 8.9* 8.9*   HCT 27.3* 27.3*    209   MCV 87 88   RDW 16.8* 17.0*        Chemistries:  Recent Labs   Lab 06/20/25  0338 06/21/25  0448    141   K 3.4* 4.1    105   CO2 26 27   BUN 34* 41*   CREATININE 1.2 1.2   CALCIUM 8.7 8.9   ALBUMIN 1.7* 1.9*   PROT 5.2* 5.1*   BILITOT 0.3 0.4   ALKPHOS 99 100   ALT 68* 84*   AST 50* 53*   MG 2.1 2.2   PHOS 2.6* 2.1*       BMP:   Recent  Labs   Lab 06/21/25  0448   *      K 4.1      CO2 27   BUN 41*   CREATININE 1.2   CALCIUM 8.9   MG 2.2     CMP:   Recent Labs   Lab 06/20/25  0338 06/21/25  0448    141   K 3.4* 4.1    105   CO2 26 27   * 130*   BUN 34* 41*   CREATININE 1.2 1.2   CALCIUM 8.7 8.9   PROT 5.2* 5.1*   ALBUMIN 1.7* 1.9*   BILITOT 0.3 0.4   ALKPHOS 99 100   AST 50* 53*   ALT 68* 84*   ANIONGAP 10 9       Significant Imaging:  I have reviewed all pertinent imaging results/findings within the past 24 hours.    ABG  Recent Labs   Lab 06/18/25  1339   PH 7.471*   PO2 32.9*   PCO2 38.7   HCO3 27.7     Assessment/Plan:     Psychiatric  Anxiety  - was started on Precedex for a short period while in the ED due to anxiety, restart home meds when appropriate       Pulmonary  * Acute on chronic respiratory failure with hypoxia  79 year old male with a PMHx COPD on 4L baseline, SHOLA, HTN, CAD, left lung adenocarcinoma with brain mets s/p chemo/RT/immuno (12/24), GERD, HLD presented to [facility] from LTAC with worsening shortness of breath. In the ED, he was AF. 170s bpm. Labs were notable for leukocytosis 17.04 with left shift. Hgb 11.7. K 3.3. Phosphorus 1.8. BNP and Trop wnl. Lactate normal. VBG 7.471/38.7/32.9/27.7. He was started on 15L non breather and transitioned to BiPAP and weaned to HFNC 60L but BiPAP was restarted with concern for increased WOB despite VBG showing some improvement and O2 sats in the 90s. HR had minimal response to IV Diltiazem. CXR with similar perihilar and lower lobe infiltrates within both lungs. CT chest w/o contrast with interval development of bilateral small pleural effusion and R and L ground glass opacities concerning for progression of infectious processes.    Patient with Hypoxic Respiratory failure which is Acute on chronic.  he is on home oxygen at 4 LPM. Supplemental oxygen was provided and noted-      .   Signs/symptoms of respiratory failure include- tachypnea, increased work  of breathing, respiratory distress, and use of accessory muscles. Contributing possible diagnoses includes - COPD, Pleural effusion, and severe Pneumonia Labs and images were reviewed. Patient Has recent ABG, which has been reviewed. Will treat underlying causes and adjust management of respiratory failure as follows-     - F/u Bcx  NGTD   - Resp cx pending   - BNP 62, Lac of 1.2  - RIP negative   - Procal .71     - CTA chest with ill-defined airspace opacities in the right upper and middle lobes when compared to previous on 5/22/25, which could indicate  aspiration, Pna.   - Continue levalbuterol and home inhaler   - Continue abx with cefepime, and Levoquin and de-escalate as clinically appropriate. Vanc stopped on 6/19, Cefepime on 6/20. Will continue Levaquin to finish course of tx.    - Continue Bipap qhs and continue to monitor respiratory status and repeat abg as needed. Transitioned to NC on 6/19 with Bipap qhs     COPD with acute exacerbation    Patient with a history of COPD On Symbicort Spiriva and Daliresp at home.  Follows up with pulmonology outpatient.   Presents with continued dyspnea, use of accessory muscles for breathing, and worsening of baseline hypoxia currently.  Patient is currently on COPD Pathway. Continue scheduled inhalers Steroids, Antibiotics, and Supplemental oxygen and monitor respiratory status closely.   - Continue home inhalers   - Continue Levabuterol while inpatient, given that albuterol can cause tachycardia           Cardiac/Vascular  Dyslipidemia  Continue Atorvastatin     CAD (coronary artery disease)  Patient with known history of  CAD s/p stent placement and CABG, which is controlled Will continue Statin and monitor for S/Sx of angina/ACS. Continue to monitor on telemetry.     Atrial fibrillation with RVR  Patient with mix of irregular and regular SVT concerning for MAT vs afib w rvr cannot be certain based on ECG and telemetry. This has occurred in the setting of acute on  chronic hypoxic respiratory failure in the setting of pneumonia vs COPD exacerbation. He has a recent normal TTE and an allergy to metoprolol. Has a history of documented suspected Aflutter back on June 9-11 which he converted to sinus rhythm with PACs after IV diltiazem and started on PO Dilt at home.      Recommendations:  - continue telemetry  - daily ECG as needed   - K > 4, Mg > 2  - Received IV Dilt in the ED   - recommend AC given CHADSVASc of 4 , continue home eliquis   - Consider starting Amiodarone if patient rate remains uncontrolled since he became hypotensive with Dilt   - Amio gtt started on 6/19 due to persistent uncontrolled rate  , transition to PO on 6/20   - hep gtt transition to eliquis on 6/20             Oncology  Metastasis to brain   IV NSCLC (cT3 cN1 M1b) adenocarcinoma  diagnosed on EBUS 4/30/24 s/p definitive chemo-RT to 60 Gy in 30 fx completed 6/18/24 with Dr. Deng. Staging MRI Brain 4/3/24 demonstrated a 0.8 cm Left thalamic metastasis; this was observed. MRI Brain 7/26/24 demonstrated interval increase in size to 2.2 cm; no other evidence of intracranial disease. He completed GK SRT 27 Gy in 3 fx on 8/7/24. MRI Brain 10/7/24 demonstrated new 1.9 cm Right medial temporo-occipital metastasis. He completed GK SRS 20 Gy x1 to Rt temporal metastasis 10/11/24. MRI Brain 1/24/25 demonstrated a 0.4 cm posterior Right temporal lobe met. This was treated 22 Gy x1 on 1/31/25.      MRI Brain 3/28/25 with improved edema in Right temporal lobe and stable treated enhancing lesion in the Right temporal lobe. No evidence of new/recurrent intracranial disease.   - recently on dexamethasone taper, but stopped taking on 05/12  - following with OP neuro onc    Adenocarcinoma, lung, left  Stage IV NSCLC (cT3 cN1 M1b) adenocarcinoma Completed treatment with chemo/XRT. Brain XRT for lesionsx2. off immunotherapy since 12/24. Follows with Dr. Ponce outpatient       GI  GERD (gastroesophageal reflux  disease)  - Continue PPI          Critical Care Daily Checklist:    A: Awake: RASS Goal/Actual Goal:    Actual:     B: Spontaneous Breathing Trial Performed?     C: SAT & SBT Coordinated?  NA                      D: Delirium: CAM-ICU     E: Early Mobility Performed? Yes   F: Feeding Goal: Goals: meet % een/epn by next RD f/u  Status: Nutrition Goal Status: new   Current Diet Order   Procedures    Diet Soft & Bite Sized (IDDSI Level 6)      AS: Analgesia/Sedation NA   T: Thromboembolic Prophylaxis Eliquis   H: HOB > 300 Yes   U: Stress Ulcer Prophylaxis (if needed) PPI   G: Glucose Control controlled   B: Bowel Function Unmeasured Stool Occurrence: 1   I: Indwelling Catheter (Lines & Hernandez) Necessity PIV    D: De-escalation of Antimicrobials/Pharmacotherapies On Levofloxacin     Plan for the day/ETD Stepdown     Code Status:  Family/Goals of Care: DNR         Critical secondary to Patient has a condition that poses threat to life and bodily function: Severe Respiratory Distress      Critical care was time spent personally by me on the following activities: development of treatment plan with patient or surrogate and bedside caregivers, discussions with consultants, evaluation of patient's response to treatment, examination of patient, ordering and performing treatments and interventions, ordering and review of laboratory studies, ordering and review of radiographic studies, pulse oximetry, re-evaluation of patient's condition. This critical care time did not overlap with that of any other provider or involve time for any procedures.     Marcus Davidson MD  Critical Care Medicine  [facility] - [facility]iology Stepdown

## 2025-06-21 NOTE — ASSESSMENT & PLAN NOTE
Patient with mix of irregular and regular SVT concerning for MAT vs afib w rvr cannot be certain based on ECG and telemetry. This has occurred in the setting of acute on chronic hypoxic respiratory failure in the setting of pneumonia vs COPD exacerbation. He has a recent normal TTE and an allergy to metoprolol. Has a history of documented suspected Aflutter back in June 9-11 which he converted to sinus rhythm with PACs after IV diltiazem and started on PO Dilt at home.      Plan:  - continue telemetry  - daily ECG as needed   - K > 4, Mg > 2  - Received IV Dilt in the ED   - Continue Eliquis: CHADSVASc of 4   - Consider starting Amiodarone if patient rate remains uncontrolled since he became hypotensive with Dilt   - Amio gtt started on 6/19 due to persistent uncontrolled rate, transitioned to PO on 6/20   - hep gtt transitioned to eliquis on 6/20

## 2025-06-21 NOTE — PLAN OF CARE
Patient transferred to room 329 for continued medical management.     CM/SW has been discussing discharge plan   Discharge Plan A: Long-term acute care facility (LTAC)  Discharge Plan B: Long-term acute care facility (LTAC)    with patient and Extended Emergency Contact Information  Primary Emergency Contact: Marilou Grimaldo  Address: [address]  Mobile Phone: [phone]  Relation: Daughter  Preferred language: English   needed? No  Secondary Emergency Contact: Marzena Schwartz   [facility]  Home Phone: [phone]  Mobile Phone: [phone]  Relation: Daughter   needed? No.     Patient/Family preference is return to [facility].    Additional Info:       Therese Raza RN  Weekend  - [facility]  Ext. [phone]

## 2025-06-21 NOTE — ASSESSMENT & PLAN NOTE
Likely due to worsening pneumonia +/- COPD exacerbation. HFNC 60?L/40% FiO?, sats 89% -- borderline; watch for signs of fatigue     Current antimicrobial regimen consists of the antibiotics listed below. Will monitor patient closely and continue current treatment plan unchanged.    Antibiotics (From admission, onward)      Start     Stop Route Frequency Ordered    06/19/25 0900  mupirocin 2 % ointment         06/24/25 0859 Nasl 2 times daily 06/18/25 1523    06/18/25 1145  levoFLOXacin 750 mg/150 mL IVPB 750 mg         06/24/25 2359 IV Every 24 hours (non-standard times) 06/18/25 1034            Microbiology Results (last 7 days)       Procedure Component Value Units Date/Time    Blood culture [[id number]]  (Normal) Collected: 06/18/25 1407    Order Status: Completed Specimen: Blood from Peripheral, Wrist, Left Updated: 06/20/25 1702     Blood Culture No Growth After 48 Hours    Blood culture [[id number]]  (Normal) Collected: 06/18/25 1407    Order Status: Completed Specimen: Blood from Peripheral, Wrist, Left Updated: 06/20/25 1702     Blood Culture No Growth After 48 Hours    Respiratory Infection Panel (PCR), Nasopharyngeal [[id number]] Collected: 06/18/25 1536    Order Status: Completed Specimen: Nasopharyngeal Swab Updated: 06/18/25 1647     Respiratory Infection Panel Source Nasopharyngeal Swab     Adenovirus Not Detected     Coronavirus 229E, Common Cold Virus Not Detected     Coronavirus HKU1, Common Cold Virus Not Detected     Coronavirus NL63, Common Cold Virus Not Detected     Coronavirus OC43, Common Cold Virus Not Detected     SARS-CoV2 (COVID-19) Qualitative PCR Not Detected     Human Metapneumovirus Not Detected     Human Rhinovirus/Enterovirus Not Detected     Influenza A Not Detected     Influenza B Not Detected     Parainfluenza Virus 1 Not Detected     Parainfluenza Virus 2 Not Detected     Parainfluenza Virus 3 Not Detected     Parainfluenza Virus 4 Not Detected     Respiratory Syncytial Virus  Not Detected     Bordetella Parapertussis (DI3169) Not Detected     Bordetella pertussis (ptxP) Not Detected     Chlamydia pneumoniae Not Detected     Mycoplasma pneumoniae Not Detected    Culture, Respiratory with Gram Stain [[id number]]     Order Status: Sent Specimen: Sputum, Expectorated                - 2/4 SIRS criteria: WBC/RR  - blood cx pending  - respiratory cx pending  - CT chest with Bilateral effusions.Right and left ground-glass opacities, new from prior, concerning for progression of infectious process.     Plan  - Avoid Albuterol  - Start Levalbuterol and Ipratropium   - Monitor closely for need for BiPAP or intubation  - Abx: Levofloxacin  - Labs: CBC, CMP, ABG, lactate, procalcitonin, sputum cultures  Maintain tight monitoring: HR, BP, RR, mental status, gas exchange  - Safe for LTAC.. working with CM for a transfer if status remains stable

## 2025-06-21 NOTE — PLAN OF CARE
SW received notification of pt's stepdown and possible return back to LTAC between today and tomorrow. BRIANNE reached out to Lakshmi with TA to review for return. Per Lakshmi, Pt can possibly return on tomorrow after she speak with her team. BRIANNE notified MD, who voiced understanding and stated that pt would benefit from observation on the unit overnight. BRIANNE will continue to follow as needed.     BOBBY Horton  Pediatric Social Worker   [facility]  Phone : [phone]

## 2025-06-21 NOTE — CONSULTS
RD consulted to optimize nutrition. Pt last seen by RD 6/19.    Recommendations  Soft & Bite Sized diet  RD added Boost Plus TID to aid in wound healing  Nursing, continue documenting PO intake via flowsheets  If alternative means of nutrition warranted, consult RD  Monitor labs, meds, weight, skin     Goals: meet % een/epn by next RD f/u  Nutrition Goal Status: new  Communication of RD Recs: other (comment) (poc)    Thanks,    Clau Howard RD, LDN

## 2025-06-21 NOTE — PT/OT/SLP EVAL
Physical Therapy  Co-Evaluation and treatment with OT    Patient Name:  Jordin Allen Jr.   MRN:  [id number]    Recommendations:     Discharge Recommendations: Moderate Intensity Therapy   Discharge Equipment Recommendations: wheelchair   Barriers to discharge: None    Assessment:     Jordin Allen Jr. is a 79 y.o. male admitted with a medical diagnosis of Acute on chronic respiratory failure with hypoxia.  He presents with the following impairments/functional limitations: impaired endurance, impaired functional mobility, gait instability, impaired balance, decreased safety awareness, decreased lower extremity function pt tolerated treatment well but SOB limited functional mobility. Pt will benefit from skilled PT 4x/wk to progress physically.  Pt is significantly below previous functional level, increased risk of falls and increased burden of care currently. Patient currently demonstrates a need for moderate intensity therapy on a daily basis post acute secondary to a decline in functional status due to illness. Pt was transferred from [facility] with c/o chest pain, SOB and had increased HR.  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     Rehab Prognosis: Good; patient would benefit from acute skilled PT services to address these deficits and reach maximum level of function.    Recent Surgery: * No surgery found *      Plan:     During this hospitalization, patient to be seen 4 x/week to address the identified rehab impairments via gait training, therapeutic activities, therapeutic exercises and progress toward the following goals:    Plan of Care Expires:  07/18/25    Subjective     Chief Complaint: pt c/o pain and SOB with treatment.   Patient/Family Comments/goals:  to get better and go home.   Pain/Comfort:  Pain Rating 1: 6/10 (2nd toe on R foot)  Pain Addressed 1: Distraction  Pain Rating Post-Intervention 1: 6/10 (2nd toe on R foot)    Patients cultural, spiritual, Rastafarian conflicts given the current  situation: no    Living Environment:  Pt is retired and lives with his daughter and son -in-law in 2 story with  B&B downstairs. There is a slab entrance.   Prior to admission, patients level of function was modified independent with RW until a few weeks ago.  Equipment used at home: cane, straight, walker, rolling (walk in shower with built in bench, CPAP at night.).  DME owned (not currently used): none.  Upon discharge, patient will have assistance from daughter after work and son in law works from home. .    Objective:     Communicated with nurse  prior to session.  Patient found supine with oxygen, telemetry, peripheral IV  upon PT entry to room.    General Precautions: Standard, fall  Orthopedic Precautions:    Braces:    Respiratory Status: Nasal cannula, flow 4 L/min    Exams:  Cognitive Exam:  Patient is oriented to Person, Place, Time, and Situation  RLE ROM: WFL  RLE Strength: 3/5 for major muscle groups.   LLE ROM: WFL  LLE Strength: 3/5 for major muscle groups    Functional Mobility:  Bed Mobility:   pt needed verbal cues for hand placement and sequencing for functional mobility.   Rolling Left:  stand by assistance  Supine to Sit: stand by assistance  Sit to Supine: minimum assistance BLE management    Transfers:     Sit to Stand:  moderate assistance and of 2 persons with rolling walker. Pt was not able to achieve upright posture and was flexed at the hips with standing.     Balance: pt sat on EOB with SBA. Pt performed ADLS with OT.    Pt needed significant rest periods and increased time to perform tasks due to SOB. Pt O2 sats were 91% during treatment but his WOB increased. Pt reported increased difficulty breathing as session progressed. SOB limited session.     Due to pt complex medical condition, the skill of 2 licensed therapists is needed to maximize treatment session and progression towards goals  Pt white board updated with current therapists name and level of mobility assistance needed.          AM-PAC 6 CLICK MOBILITY  Total Score:11       Treatment & Education:  Pt received verbal instructions in role of PT and POC. Pt verbally expressed understanding of such.     Patient left supine with all lines intact and call button in reach.    GOALS:   Multidisciplinary Problems       Physical Therapy Goals          Problem: Physical Therapy    Goal Priority Disciplines Outcome Interventions   Physical Therapy Goal     PT, PT/OT Progressing    Description: Goals to be met by: 25     Patient will increase functional independence with mobility by performin. Sit to supine with Stand-by Assistance  2. Sit to stand transfer with Minimal Assistance with RW.   3. Bed to chair transfer with Minimal Assistance using Rolling Walker  4. Gait  x 30 feet with Minimal Assistance using Rolling Walker.   5. Lower extremity exercise program x15 reps per handout, with assistance as needed to increased tolerance to activities.                          DME Justifications:  Jordin Allen Jr. has a mobility limitation that significantly impairs his ability to participate in one or more mobility related activities of daily living (MRADLs) such as toileting, feeding, dressing, grooming, and bathing in customary locations in the home.  The mobility limitation cannot be sufficiently resolved by the use of a cane or walker.   The use of a manual wheelchair will significantly improve the patients ability to participate in MRADLS and the patient will use it on regular basis in the home.  Jordin Allen Jr. has expressed his willingness to use a manual wheelchair in the home. Patients upper body strength is sufficient for propulsion.  He also has a caregiver who is available, willing, and able to provide assistance with the wheelchair when needed.      History:     Past Medical History:   Diagnosis Date    Benign neoplasm of colon 10/01/2013    Bronchitis chronic     CAD (coronary artery disease) 10/31/2013    Cataract  2008    COPD (chronic obstructive pulmonary disease)     Emphysema of lung     Encounter for preventive health examination 03/21/2018    GERD (gastroesophageal reflux disease)     Glaucoma 2010    Hearing loss 2015    Heart attack 2013    Hx of colonic polyps     Hypertension     NAFLD (nonalcoholic fatty liver disease) 03/27/2017    SHOLA on CPAP     RLS (restless legs syndrome)     Screen for colon cancer 08/30/2013       Past Surgical History:   Procedure Laterality Date    bilateral foot surgery  1993    CARDIAC CATHETERIZATION  2013    x1    CATARACT EXTRACTION, BILATERAL      COLON SURGERY  2013    Ace Mott MD    COLONOSCOPY N/A 03/21/2018    Procedure: COLONOSCOPY;  Surgeon: Terry Mott MD;  Location: [facility] ([address] [place] [address]);  Service: Endoscopy;  Laterality: N/A;    COLONOSCOPY N/A 04/12/2019    Procedure: COLONOSCOPY;  Surgeon: John Mantilla MD;  Location: [facility] ([address] [place] [address]);  Service: Endoscopy;  Laterality: N/A;    COLONOSCOPY N/A 05/31/2022    Procedure: COLONOSCOPY;  Surgeon: MEDINA Farris MD;  Location: [facility] ([address] [place] [address]);  Service: Endoscopy;  Laterality: N/A;  fully vacc-inst portal-tb    ENDOBRONCHIAL ULTRASOUND Bilateral 04/30/2024    Procedure: ENDOBRONCHIAL ULTRASOUND (EBUS);  Surgeon: Naveed Aguero MD;  Location: [facility] OR;  Service: Pulmonary;  Laterality: Bilateral;    EYE SURGERY  2011    scrotal abscess removal      TYMPANOSTOMY TUBE PLACEMENT  2017       Time Tracking:     PT Received On: 06/21/25  PT Start Time: 1244     PT Stop Time: 1310  PT Total Time (min): 26 min     Billable Minutes: Evaluation 8 min  and Therapeutic Activity 18 min       06/21/2025

## 2025-06-21 NOTE — ASSESSMENT & PLAN NOTE
Patient's blood pressure range in the last 24 hours was: BP  Min: 117/68  Max: 167/107.    Plan  - BP is relatively controlled. Will restart home meds as soon as needed.

## 2025-06-21 NOTE — PLAN OF CARE
MICU DAILY GOALS     Family/Goals of care/Code Status   Code Status: DNR    24H Vital Sign Range  Temp:  [98.2 °F (36.8 °C)-98.4 °F (36.9 °C)]   Pulse:  []   Resp:  [13-35]   BP: (117-167)/()   SpO2:  [89 %-99 %]      Shift Events (include procedures and significant events)   No acute events throughout shift    AWAKE RASS: Goal -    Actual - RASS (Vasquez Agitation-Sedation Scale): alert and calm    Restraint necessity: Not necessary   BREATHE SBT: Not intubated    Coordinate A & B, analgesics/sedatives Pain: managed   SAT: Not intubated   Delirium CAM-ICU:     Early(intubated/ Progressive (non-intubated) Mobility MOVE Screen (INTUBATED ONLY): Not intubated    Activity: Activity Management: Rolling - L1   Feeding/Nutrition Diet order: Diet/Nutrition Received: low saturated fat/low cholesterol,     Thrombus DVT prophylaxis: VTE Core Measure: Pharmacological prophylaxis initiated/maintained   HOB Elevation Head of Bed (HOB) Positioning: HOB at 30 degrees   Ulcer Prophylaxis GI: yes   Glucose control managed     Skin Skin assessment:     Sacrum intact/not altered? No  Heels intact/not altered? Yes  Surgical wound? No    CHECK ONE!   (no altered skin or altered skin) and sub boxes:  [] No Altered Skin Integrity Present    []Prevention Measures Documented    [x] Altered Skin Integrity Present or Discovered   [x] LDA already present in EPIC, daily doc completed              [] LDA added if not already in EPIC (describe/stage wound).               [] Wound Image Taken (required on admit,                   transfer/discharge and every Tuesday)    Wound Care Consulted? No   Bowel Function no issues    Indwelling Catheter Necessity            De-escalation Antibiotics Yes        VS and assessment per flow sheet, patient progressing towards goals as tolerated, plan of care reviewed with Jordin Allen, all concerns addressed, will continue to monitor.

## 2025-06-21 NOTE — SUBJECTIVE & OBJECTIVE
Interval Update: NAEON.Patient remains clinically stable over the past 12 hours. He is tolerating 4L nasal cannula with no increased oxygen requirement and continues BiPAP at night. He is hemodynamically stable, with HR controlled on PO Amiodarone and Apixaban continued. No new signs of active infection. He completed most of his antibiotic course; Levofloxacin to be continued to complete full duration (7 days).  He was evaluated by PT/OT, who recommend moderate-intensity exercise and support discharge to LTAC with rehab follow-up. Patient is ambulating short distances in the room with PT assistance. Tolerating oral intake and mood/anxiety stable. P    Review of Systems   Constitutional:  Negative for activity change, chills and fever.   HENT:  Negative for trouble swallowing.    Eyes:  Negative for photophobia and visual disturbance.   Respiratory:  Positive for cough, shortness of breath and wheezing. Negative for chest tightness.    Cardiovascular:  Negative for palpitations and leg swelling.   Gastrointestinal:  Negative for abdominal pain, constipation, diarrhea, nausea and vomiting.   Genitourinary:  Negative for dysuria, frequency, hematuria and urgency.   Musculoskeletal:  Negative for arthralgias, back pain and gait problem.   Skin:  Negative for color change and rash.   Neurological:  Negative for dizziness, syncope, weakness, light-headedness, numbness and headaches.   Psychiatric/Behavioral:  Negative for agitation and confusion. The patient is not nervous/anxious.      Objective:     Vital Signs (Most Recent):  Temp: 97.4 °F (36.3 °C) (06/21/25 0845)  Pulse: 92 (06/21/25 1207)  Resp: 16 (06/21/25 1207)  BP: 136/71 (06/21/25 0845)  SpO2: 96 % (06/21/25 1207) Vital Signs (24h Range):  Temp:  [97.4 °F (36.3 °C)-98.4 °F (36.9 °C)] 97.4 °F (36.3 °C)  Pulse:  [] 92  Resp:  [13-42] 16  SpO2:  [89 %-99 %] 96 %  BP: (117-167)/() 136/71     Weight: 87.1 kg (192 lb 0.3 oz)  Body mass index is 28.36  kg/m².    Intake/Output Summary (Last 24 hours) at 6/21/2025 1413  Last data filed at 6/21/2025 1102  Gross per 24 hour   Intake 911.15 ml   Output 525 ml   Net 386.15 ml         Physical Exam  Vitals reviewed.   Constitutional:       General: He is not in acute distress.     Appearance: He is ill-appearing. He is not toxic-appearing.   HENT:      Head: Normocephalic and atraumatic.   Eyes:      General: No scleral icterus.        Right eye: No discharge.         Left eye: No discharge.      Extraocular Movements: Extraocular movements intact.      Conjunctiva/sclera: Conjunctivae normal.      Pupils: Pupils are equal, round, and reactive to light.   Cardiovascular:      Rate and Rhythm: Tachycardia present. Rhythm irregular.      Pulses: Normal pulses.      Heart sounds: Normal heart sounds. No murmur heard.  Pulmonary:      Effort: Respiratory distress present.      Breath sounds: No stridor. Rhonchi present. No wheezing or rales.   Chest:      Chest wall: No tenderness.   Abdominal:      General: Abdomen is flat. There is no distension.      Palpations: There is no mass.      Tenderness: There is no abdominal tenderness.      Hernia: No hernia is present.   Musculoskeletal:      Right lower leg: No edema.      Left lower leg: No edema.   Skin:     General: Skin is warm and dry.      Coloration: Skin is not jaundiced or pale.      Findings: No erythema or rash.   Neurological:      Mental Status: He is alert and oriented to person, place, and time.   Psychiatric:         Mood and Affect: Mood normal.         Behavior: Behavior normal.               Significant Labs: All pertinent labs within the past 24 hours have been reviewed.  CBC:   Recent Labs   Lab 06/20/25  0338 06/21/25  0448   WBC 12.83* 10.60   HGB 8.9* 8.9*   HCT 27.3* 27.3*    209     CMP:   Recent Labs   Lab 06/20/25  0338 06/21/25  0448    141   K 3.4* 4.1    105   CO2 26 27   * 130*   BUN 34* 41*   CREATININE 1.2 1.2    CALCIUM 8.7 8.9   PROT 5.2* 5.1*   ALBUMIN 1.7* 1.9*   BILITOT 0.3 0.4   ALKPHOS 99 100   AST 50* 53*   ALT 68* 84*   ANIONGAP 10 9       Significant Imaging: I have reviewed all pertinent imaging results/findings within the past 24 hours.

## 2025-06-21 NOTE — PROGRESS NOTES
[facility] - Cardiology [facility] Medicine  Progress Note    Patient Name: Jordin Allen Jr.  MRN: [id number]  Patient Class: IP- Inpatient   Admission Date: 6/18/2025  Length of Stay: 3 days  Attending Physician: Janice Dominguez MD  Primary Care Provider: Sierra Landry MD        Subjective     Principal Problem:Acute on chronic respiratory failure with hypoxia        HPI:  79M PMHx COPD(4L), SHOLA, HTN, CAD, left lung ca (brain mets) s/p chemo/RT/immuno (12/24), GERD, HLD presenting from LTAC with chest pain, shortness of breath, and tachycardia. He was discharged 2 weeks ago after hospitalization for hypoxic respiratory failure due to parainfluenza pneumonia and COPD exacerbation, during which he was started on Apixaban and Diltiazem. Since returning to LTAC, he had persistent respiratory symptoms but acutely developed severe (9/10) chest pain, worsening SOB, and rapid heart rate this morning. Despite being on Diltiazem, he remained tachycardic, and EMS was called. En route, he became hypotensive and was electrically cardioverted, with only transient slowing of rate. He is currently on Cefepime, Vancomycin and levaquin for pneumonia.  Of note, he has been admitted to [facility]3 in the last month for continued SOB and COPD exacerbation. Admitted to  for further management      Overview/Hospital Course:  79-year-old male with a history of COPD on home O? (4L), SHOLA, CAD s/p CABG, atrial fibrillation, and stage IV NSCLC with brain metastases, who was transferred from LTAC for worsening shortness of breath, chest pain, and tachycardia.  On arrival to the ED, he was in AFib with RVR (HR ~170s) and acute on chronic hypoxic respiratory failure, requiring escalation to 15L NRB, then BiPAP. He had minimal response to IV Diltiazem and became hypotensive, necessitating step-up to MICU and initiation of Amiodarone drip on 6/19. Rate control was achieved, and he was transitioned to PO Amiodarone on 6/20. He was  anticoagulated with Heparin drip, later transitioned to Apixaban.  Infectious workup showed leukocytosis and imaging consistent with basilar airspace disease and ground-glass opacities, raising concern for aspiration pneumonia. He was treated with Vancomycin, Cefepime, and Levofloxacin, with subsequent de-escalation to Levofloxacin monotherapy. Cultures and respiratory viral panel were negative. Steroids and scheduled bronchodilators were continued for concurrent COPD exacerbation.  Respiratory status improved steadily he was weaned to HFNC, then to 4L nasal cannula, and remained stable on room air at rest by day 3. He continued to require BiPAP at night for chronic SHOLA/COPD overlap. Psychiatry was consulted for anxiety; no pharmacologic intervention was required.  PT/OT evaluated the patient and recommended moderate-intensity physical activity as tolerated. He has demonstrated adequate strength and endurance for return to LTAC with continued therapy. Oncology and Pulmonary teams were updated, and is appropriate for likely discharge back to LTAC tomorrow pending respiratory stability and LTAC logistics clarity.    Interval Update: NAEON.Patient remains clinically stable over the past 12 hours. He is tolerating 4L nasal cannula with no increased oxygen requirement and continues BiPAP at night. He is hemodynamically stable, with HR controlled on PO Amiodarone and Apixaban continued. No new signs of active infection. He completed most of his antibiotic course; Levofloxacin to be continued to complete full duration (7 days).  He was evaluated by PT/OT, who recommend moderate-intensity exercise and support discharge to LTAC with rehab follow-up. Patient is ambulating short distances in the room with PT assistance. Tolerating oral intake and mood/anxiety stable.      Review of Systems   Constitutional:  Negative for activity change, chills and fever.   HENT:  Negative for trouble swallowing.    Eyes:  Negative for  photophobia and visual disturbance.   Respiratory:  Positive for cough, shortness of breath and wheezing. Negative for chest tightness.    Cardiovascular:  Negative for palpitations and leg swelling.   Gastrointestinal:  Negative for abdominal pain, constipation, diarrhea, nausea and vomiting.   Genitourinary:  Negative for dysuria, frequency, hematuria and urgency.   Musculoskeletal:  Negative for arthralgias, back pain and gait problem.   Skin:  Negative for color change and rash.   Neurological:  Negative for dizziness, syncope, weakness, light-headedness, numbness and headaches.   Psychiatric/Behavioral:  Negative for agitation and confusion. The patient is not nervous/anxious.      Objective:     Vital Signs (Most Recent):  Temp: 97.4 °F (36.3 °C) (06/21/25 0845)  Pulse: 92 (06/21/25 1207)  Resp: 16 (06/21/25 1207)  BP: 136/71 (06/21/25 0845)  SpO2: 96 % (06/21/25 1207) Vital Signs (24h Range):  Temp:  [97.4 °F (36.3 °C)-98.4 °F (36.9 °C)] 97.4 °F (36.3 °C)  Pulse:  [] 92  Resp:  [13-42] 16  SpO2:  [89 %-99 %] 96 %  BP: (117-167)/() 136/71     Weight: 87.1 kg (192 lb 0.3 oz)  Body mass index is 28.36 kg/m².    Intake/Output Summary (Last 24 hours) at 6/21/2025 1413  Last data filed at 6/21/2025 1102  Gross per 24 hour   Intake 911.15 ml   Output 525 ml   Net 386.15 ml         Physical Exam  Vitals reviewed.   Constitutional:       General: He is not in acute distress.     Appearance: He is ill-appearing. He is not toxic-appearing.   HENT:      Head: Normocephalic and atraumatic.   Eyes:      General: No scleral icterus.        Right eye: No discharge.         Left eye: No discharge.      Extraocular Movements: Extraocular movements intact.      Conjunctiva/sclera: Conjunctivae normal.      Pupils: Pupils are equal, round, and reactive to light.   Cardiovascular:      Rate and Rhythm: Tachycardia present. Rhythm irregular.      Pulses: Normal pulses.      Heart sounds: Normal heart sounds. No murmur  heard.  Pulmonary:      Effort: Respiratory distress present.      Breath sounds: No stridor. Rhonchi present. No wheezing or rales.   Chest:      Chest wall: No tenderness.   Abdominal:      General: Abdomen is flat. There is no distension.      Palpations: There is no mass.      Tenderness: There is no abdominal tenderness.      Hernia: No hernia is present.   Musculoskeletal:      Right lower leg: No edema.      Left lower leg: No edema.   Skin:     General: Skin is warm and dry.      Coloration: Skin is not jaundiced or pale.      Findings: No erythema or rash.   Neurological:      Mental Status: He is alert and oriented to person, place, and time.   Psychiatric:         Mood and Affect: Mood normal.         Behavior: Behavior normal.               Significant Labs: All pertinent labs within the past 24 hours have been reviewed.  CBC:   Recent Labs   Lab 06/20/25 0338 06/21/25  0448   WBC 12.83* 10.60   HGB 8.9* 8.9*   HCT 27.3* 27.3*    209     CMP:   Recent Labs   Lab 06/20/25  0338 06/21/25  0448    141   K 3.4* 4.1    105   CO2 26 27   * 130*   BUN 34* 41*   CREATININE 1.2 1.2   CALCIUM 8.7 8.9   PROT 5.2* 5.1*   ALBUMIN 1.7* 1.9*   BILITOT 0.3 0.4   ALKPHOS 99 100   AST 50* 53*   ALT 68* 84*   ANIONGAP 10 9       Significant Imaging: I have reviewed all pertinent imaging results/findings within the past 24 hours.      Assessment & Plan  GERD (gastroesophageal reflux disease)  - continue hospital formulary alternative PPI  CAD (coronary artery disease)  Patient with known history of  CAD s/p stent placement and CABG, which is controlled Will continue ASA and Statin and monitor for S/Sx of angina/ACS. Continue to monitor on telemetry.   COPD with acute exacerbation  Patient's COPD is with exacerbation noted by continued dyspnea, use of accessory muscles for breathing, and worsening of baseline hypoxia currently.  Patient is currently on COPD Pathway. Continue scheduled inhalers  Steroids, Antibiotics, and Supplemental oxygen and monitor respiratory status closely.   Recent steroid taper completed--possible new exacerbation      Plan:  - Re-initiate systemic steroids; Hydrocort 50 mg Q6H  - Stop Albuterol and   - Start Levalbutrerol with Ipratropium  - Continue Abx(Levofloxacin)  - Monitor for worsening hypercapnia, repeat ABG  as indicated      Dyslipidemia  Continue  Atorvastatin    Acute on chronic respiratory failure with hypoxia  Likely due to worsening pneumonia +/- COPD exacerbation. HFNC 60?L/40% FiO?, sats 89% -- borderline; watch for signs of fatigue     Current antimicrobial regimen consists of the antibiotics listed below. Will monitor patient closely and continue current treatment plan unchanged.    Antibiotics (From admission, onward)      Start     Stop Route Frequency Ordered    06/19/25 0900  mupirocin 2 % ointment         06/24/25 0859 Nasl 2 times daily 06/18/25 1523    06/18/25 1145  levoFLOXacin 750 mg/150 mL IVPB 750 mg         06/24/25 2359 IV Every 24 hours (non-standard times) 06/18/25 1034            Microbiology Results (last 7 days)       Procedure Component Value Units Date/Time    Blood culture [[id number]]  (Normal) Collected: 06/18/25 1407    Order Status: Completed Specimen: Blood from Peripheral, Wrist, Left Updated: 06/20/25 1702     Blood Culture No Growth After 48 Hours    Blood culture [[id number]]  (Normal) Collected: 06/18/25 1407    Order Status: Completed Specimen: Blood from Peripheral, Wrist, Left Updated: 06/20/25 1702     Blood Culture No Growth After 48 Hours    Respiratory Infection Panel (PCR), Nasopharyngeal [[id number]] Collected: 06/18/25 1536    Order Status: Completed Specimen: Nasopharyngeal Swab Updated: 06/18/25 1647     Respiratory Infection Panel Source Nasopharyngeal Swab     Adenovirus Not Detected     Coronavirus 229E, Common Cold Virus Not Detected     Coronavirus HKU1, Common Cold Virus Not Detected     Coronavirus NL63, Common  Cold Virus Not Detected     Coronavirus OC43, Common Cold Virus Not Detected     SARS-CoV2 (COVID-19) Qualitative PCR Not Detected     Human Metapneumovirus Not Detected     Human Rhinovirus/Enterovirus Not Detected     Influenza A Not Detected     Influenza B Not Detected     Parainfluenza Virus 1 Not Detected     Parainfluenza Virus 2 Not Detected     Parainfluenza Virus 3 Not Detected     Parainfluenza Virus 4 Not Detected     Respiratory Syncytial Virus Not Detected     Bordetella Parapertussis (SD6253) Not Detected     Bordetella pertussis (ptxP) Not Detected     Chlamydia pneumoniae Not Detected     Mycoplasma pneumoniae Not Detected    Culture, Respiratory with Gram Stain [[id number]]     Order Status: Sent Specimen: Sputum, Expectorated                - 2/4 SIRS criteria: WBC/RR  - blood cx pending  - respiratory cx pending  - CT chest with Bilateral effusions.Right and left ground-glass opacities, new from prior, concerning for progression of infectious process.     Plan  - Avoid Albuterol  - Start Levalbuterol and Ipratropium   - Monitor closely for need for BiPAP or intubation  - Abx: Levofloxacin  - Labs: CBC, CMP, ABG, lactate, procalcitonin, sputum cultures  Maintain tight monitoring: HR, BP, RR, mental status, gas exchange  - Safe for LTAC.. working with CM for a transfer if status remains stable   Atrial fibrillation with RVR  Patient with mix of irregular and regular SVT concerning for MAT vs afib w rvr cannot be certain based on ECG and telemetry. This has occurred in the setting of acute on chronic hypoxic respiratory failure in the setting of pneumonia vs COPD exacerbation. He has a recent normal TTE and an allergy to metoprolol. Has a history of documented suspected Aflutter back in June 9-11 which he converted to sinus rhythm with PACs after IV diltiazem and started on PO Dilt at home.      Plan:  - continue telemetry  - daily ECG as needed   - K > 4, Mg > 2  - Received IV Dilt in the ED   -  Continue Eliquis: CHADSVASc of 4   - Consider starting Amiodarone if patient rate remains uncontrolled since he became hypotensive with Dilt   - Amio gtt started on 6/19 due to persistent uncontrolled rate, transitioned to PO on 6/20   - hep gtt transitioned to eliquis on 6/20       HTN (hypertension), benign  Patient's blood pressure range in the last 24 hours was: BP  Min: 117/68  Max: 167/107.    Plan  - BP is relatively controlled. Will restart home meds as soon as needed.      VTE Risk Mitigation (From admission, onward)           Ordered     apixaban tablet 5 mg  2 times daily         06/20/25 1249                    Discharge Planning   MEI: 6/23/2025     Code Status: DNR   Medical Readiness for Discharge Date:   Discharge Plan A: Long-term acute care facility (LTAC)   Discharge Delays: None known at this time      SDOH Screening:  The patient was screened for utility difficulties, food insecurity, transport difficulties, housing insecurity, and interpersonal safety and there were no concerns identified this admission.        Swapna Birmingham MD  Department of Hospital Medicine   [facility] - Cardiology Stepdown

## 2025-06-21 NOTE — PLAN OF CARE
Problem: Physical Therapy  Goal: Physical Therapy Goal  Description: Goals to be met by: 25     Patient will increase functional independence with mobility by performin. Sit to supine with Stand-by Assistance  2. Sit to stand transfer with Minimal Assistance with RW.   3. Bed to chair transfer with Minimal Assistance using Rolling Walker  4. Gait  x 30 feet with Minimal Assistance using Rolling Walker.   5. Lower extremity exercise program x15 reps per handout, with assistance as needed to increased tolerance to activities.     Outcome: Progressing   Evaluation complete and goals appropriate.2025

## 2025-06-21 NOTE — PLAN OF CARE
Problem: Occupational Therapy  Goal: Occupational Therapy Goal  Description: Goals to be met by: 7 days 7/4/25     Patient will increase functional independence with ADLs by performing:    Pt to complete UE dressing with MIN A   Pt to complete LE dressing with MOD A   Pt to complete t/f BSC with MIN A   Pt to complete UE HEP to increase functional ROM and strength.   Pt to complete seated g/h skills with supervision.     Outcome: Progressing

## 2025-06-21 NOTE — NURSING
Pt to 329 without incident. Pt care to CSU RN with report. Bed low/locked. Tele in place. Pt oriented to room without questions.

## 2025-06-21 NOTE — SUBJECTIVE & OBJECTIVE
Interval History/Significant Events: BRITTANY, HR controlled, had a bit better night of sleep and used CPAP. Pending  SD.       Review of Systems  Objective:     Vital Signs (Most Recent):  Temp: 97.9 °F (36.6 °C) (06/21/25 0700)  Pulse: 79 (06/21/25 0757)  Resp: (!) 26 (06/21/25 0757)  BP: (!) 150/73 (06/21/25 0700)  SpO2: 99 % (06/21/25 0757) Vital Signs (24h Range):  Temp:  [97.9 °F (36.6 °C)-98.4 °F (36.9 °C)] 97.9 °F (36.6 °C)  Pulse:  [] 79  Resp:  [13-42] 26  SpO2:  [89 %-99 %] 99 %  BP: (117-167)/() 150/73   Weight: 87.1 kg (192 lb 0.3 oz)  Body mass index is 28.36 kg/m².      Intake/Output Summary (Last 24 hours) at 6/21/2025 0846  Last data filed at 6/20/2025 1717  Gross per 24 hour   Intake 911.15 ml   Output 700 ml   Net 211.15 ml          Physical Exam  Vitals and nursing note reviewed.   Constitutional:       General: He is not in acute distress.     Appearance: He is ill-appearing. He is not toxic-appearing.   HENT:      Head: Normocephalic and atraumatic.   Eyes:      General: No scleral icterus.        Right eye: No discharge.         Left eye: No discharge.      Extraocular Movements: Extraocular movements intact.      Conjunctiva/sclera: Conjunctivae normal.      Pupils: Pupils are equal, round, and reactive to light.   Cardiovascular:      Rate and Rhythm: Rhythm irregular.      Pulses: Normal pulses.      Heart sounds: Normal heart sounds. No murmur heard.  Pulmonary:      Effort: Respiratory distress present.      Breath sounds: No stridor. Rhonchi present. No wheezing or rales.      Comments: On BiPAP  Chest:      Chest wall: No tenderness.   Abdominal:      General: Abdomen is flat. There is no distension.      Palpations: Abdomen is soft. There is no mass.      Tenderness: There is no abdominal tenderness.      Hernia: No hernia is present.   Musculoskeletal:      Right lower leg: No edema.      Left lower leg: No edema.   Skin:     General: Skin is warm.      Coloration: Skin is  not jaundiced or pale.      Findings: No erythema or rash.   Neurological:      Mental Status: He is alert and oriented to person, place, and time.   Psychiatric:         Mood and Affect: Mood normal.         Behavior: Behavior normal.            Vents:  Oxygen Concentration (%): 50 (06/20/25 0701)  Lines/Drains/Airways       Peripheral Intravenous Line  Duration                  Midline Catheter - Single Lumen 06/14/25 1245 Left basilic vein (medial side of arm) other (see comments) 6 days    Peripheral IV 06/19/25 2111 22 G Right Hand 1 day    Peripheral IV Single Lumen 06/19/25 0854 20 G 1 3/4 in Anterior;Right Forearm 1 day                  Significant Labs:    CBC/Anemia Profile:  Recent Labs   Lab 06/20/25  0338 06/21/25  0448   WBC 12.83* 10.60   HGB 8.9* 8.9*   HCT 27.3* 27.3*    209   MCV 87 88   RDW 16.8* 17.0*        Chemistries:  Recent Labs   Lab 06/20/25  0338 06/21/25  0448    141   K 3.4* 4.1    105   CO2 26 27   BUN 34* 41*   CREATININE 1.2 1.2   CALCIUM 8.7 8.9   ALBUMIN 1.7* 1.9*   PROT 5.2* 5.1*   BILITOT 0.3 0.4   ALKPHOS 99 100   ALT 68* 84*   AST 50* 53*   MG 2.1 2.2   PHOS 2.6* 2.1*       BMP:   Recent Labs   Lab 06/21/25  0448   *      K 4.1      CO2 27   BUN 41*   CREATININE 1.2   CALCIUM 8.9   MG 2.2     CMP:   Recent Labs   Lab 06/20/25  0338 06/21/25  0448    141   K 3.4* 4.1    105   CO2 26 27   * 130*   BUN 34* 41*   CREATININE 1.2 1.2   CALCIUM 8.7 8.9   PROT 5.2* 5.1*   ALBUMIN 1.7* 1.9*   BILITOT 0.3 0.4   ALKPHOS 99 100   AST 50* 53*   ALT 68* 84*   ANIONGAP 10 9       Significant Imaging:  I have reviewed all pertinent imaging results/findings within the past 24 hours.

## 2025-06-22 VITALS
OXYGEN SATURATION: 95 % | RESPIRATION RATE: 17 BRPM | TEMPERATURE: 98 F | WEIGHT: 192 LBS | DIASTOLIC BLOOD PRESSURE: 84 MMHG | BODY MASS INDEX: 28.44 KG/M2 | HEART RATE: 99 BPM | SYSTOLIC BLOOD PRESSURE: 136 MMHG | HEIGHT: 69 IN

## 2025-06-22 LAB
ABSOLUTE NEUTROPHIL MANUAL (OHS): 9.9 K/UL
ALBUMIN SERPL BCP-MCNC: 2.3 G/DL (ref 3.5–5.2)
ALP SERPL-CCNC: 112 UNIT/L (ref 40–150)
ALT SERPL W/O P-5'-P-CCNC: 95 UNIT/L (ref 10–44)
ANION GAP (OHS): 12 MMOL/L (ref 8–16)
AST SERPL-CCNC: 48 UNIT/L (ref 11–45)
BILIRUB SERPL-MCNC: 0.5 MG/DL (ref 0.1–1)
BUN SERPL-MCNC: 35 MG/DL (ref 8–23)
CALCIUM SERPL-MCNC: 9.7 MG/DL (ref 8.7–10.5)
CHLORIDE SERPL-SCNC: 106 MMOL/L (ref 95–110)
CO2 SERPL-SCNC: 27 MMOL/L (ref 23–29)
CREAT SERPL-MCNC: 1 MG/DL (ref 0.5–1.4)
ERYTHROCYTE [DISTWIDTH] IN BLOOD BY AUTOMATED COUNT: 17.2 % (ref 11.5–14.5)
GFR SERPLBLD CREATININE-BSD FMLA CKD-EPI: >60 ML/MIN/1.73/M2
GGT SERPL-CCNC: 80 U/L (ref 8–55)
GLUCOSE SERPL-MCNC: 90 MG/DL (ref 70–110)
HCT VFR BLD AUTO: 33 % (ref 40–54)
HGB BLD-MCNC: 10.3 GM/DL (ref 14–18)
LYMPHOCYTES NFR BLD MANUAL: 6 % (ref 18–48)
MAGNESIUM SERPL-MCNC: 2.1 MG/DL (ref 1.6–2.6)
MCH RBC QN AUTO: 28.3 PG (ref 27–31)
MCHC RBC AUTO-ENTMCNC: 31.2 G/DL (ref 32–36)
MCV RBC AUTO: 91 FL (ref 82–98)
MONOCYTES NFR BLD MANUAL: 6 % (ref 4–15)
NEUTROPHILS NFR BLD MANUAL: 88 % (ref 38–73)
NUCLEATED RBC (/100WBC) (OHS): 0 /100 WBC
OHS QRS DURATION: 86 MS
OHS QTC CALCULATION: 519 MS
PHOSPHATE SERPL-MCNC: 2.3 MG/DL (ref 2.7–4.5)
PLATELET # BLD AUTO: 235 K/UL (ref 150–450)
PMV BLD AUTO: 10 FL (ref 9.2–12.9)
POTASSIUM SERPL-SCNC: 4.5 MMOL/L (ref 3.5–5.1)
PROT SERPL-MCNC: 5.6 GM/DL (ref 6–8.4)
RBC # BLD AUTO: 3.64 M/UL (ref 4.6–6.2)
SODIUM SERPL-SCNC: 145 MMOL/L (ref 136–145)
WBC # BLD AUTO: 11.22 K/UL (ref 3.9–12.7)

## 2025-06-22 PROCEDURE — 25000003 PHARM REV CODE 250: Performed by: STUDENT IN AN ORGANIZED HEALTH CARE EDUCATION/TRAINING PROGRAM

## 2025-06-22 PROCEDURE — 94640 AIRWAY INHALATION TREATMENT: CPT

## 2025-06-22 PROCEDURE — 63600175 PHARM REV CODE 636 W HCPCS: Performed by: EMERGENCY MEDICINE

## 2025-06-22 PROCEDURE — 85007 BL SMEAR W/DIFF WBC COUNT: CPT | Performed by: STUDENT IN AN ORGANIZED HEALTH CARE EDUCATION/TRAINING PROGRAM

## 2025-06-22 PROCEDURE — 25000242 PHARM REV CODE 250 ALT 637 W/ HCPCS: Performed by: STUDENT IN AN ORGANIZED HEALTH CARE EDUCATION/TRAINING PROGRAM

## 2025-06-22 PROCEDURE — 25000003 PHARM REV CODE 250

## 2025-06-22 PROCEDURE — 27000173 HC ACAPELLA DEVICE DH OR DM

## 2025-06-22 PROCEDURE — 82977 ASSAY OF GGT: CPT

## 2025-06-22 PROCEDURE — 93005 ELECTROCARDIOGRAM TRACING: CPT

## 2025-06-22 PROCEDURE — 93010 ELECTROCARDIOGRAM REPORT: CPT | Mod: ,,, | Performed by: INTERNAL MEDICINE

## 2025-06-22 PROCEDURE — 25000242 PHARM REV CODE 250 ALT 637 W/ HCPCS

## 2025-06-22 PROCEDURE — 99900035 HC TECH TIME PER 15 MIN (STAT)

## 2025-06-22 PROCEDURE — 94761 N-INVAS EAR/PLS OXIMETRY MLT: CPT

## 2025-06-22 PROCEDURE — 36415 COLL VENOUS BLD VENIPUNCTURE: CPT

## 2025-06-22 PROCEDURE — 27000190 HC CPAP FULL FACE MASK W/VALVE

## 2025-06-22 PROCEDURE — 63600175 PHARM REV CODE 636 W HCPCS: Performed by: HOSPITALIST

## 2025-06-22 PROCEDURE — 27100171 HC OXYGEN HIGH FLOW UP TO 24 HOURS

## 2025-06-22 PROCEDURE — 94664 DEMO&/EVAL PT USE INHALER: CPT

## 2025-06-22 PROCEDURE — 25000003 PHARM REV CODE 250: Performed by: HOSPITALIST

## 2025-06-22 PROCEDURE — 80053 COMPREHEN METABOLIC PANEL: CPT | Performed by: STUDENT IN AN ORGANIZED HEALTH CARE EDUCATION/TRAINING PROGRAM

## 2025-06-22 PROCEDURE — 25000242 PHARM REV CODE 250 ALT 637 W/ HCPCS: Performed by: FAMILY MEDICINE

## 2025-06-22 PROCEDURE — 83735 ASSAY OF MAGNESIUM: CPT | Performed by: STUDENT IN AN ORGANIZED HEALTH CARE EDUCATION/TRAINING PROGRAM

## 2025-06-22 PROCEDURE — 36415 COLL VENOUS BLD VENIPUNCTURE: CPT | Performed by: STUDENT IN AN ORGANIZED HEALTH CARE EDUCATION/TRAINING PROGRAM

## 2025-06-22 PROCEDURE — 84100 ASSAY OF PHOSPHORUS: CPT | Performed by: STUDENT IN AN ORGANIZED HEALTH CARE EDUCATION/TRAINING PROGRAM

## 2025-06-22 PROCEDURE — 99900031 HC PATIENT EDUCATION (STAT)

## 2025-06-22 PROCEDURE — 11000001 HC ACUTE MED/SURG PRIVATE ROOM

## 2025-06-22 PROCEDURE — 27000221 HC OXYGEN, UP TO 24 HOURS

## 2025-06-22 PROCEDURE — 25000003 PHARM REV CODE 250: Performed by: NURSE PRACTITIONER

## 2025-06-22 RX ORDER — FLUTICASONE FUROATE AND VILANTEROL 200; 25 UG/1; UG/1
1 POWDER RESPIRATORY (INHALATION) DAILY
Status: DISCONTINUED | OUTPATIENT
Start: 2025-06-22 | End: 2025-06-22

## 2025-06-22 RX ORDER — AMIODARONE HYDROCHLORIDE 200 MG/1
TABLET ORAL
Qty: 92 TABLET | Refills: 0 | Status: SHIPPED | OUTPATIENT
Start: 2025-06-22 | End: 2025-08-29

## 2025-06-22 RX ORDER — TALC
6 POWDER (GRAM) TOPICAL NIGHTLY PRN
Start: 2025-06-22

## 2025-06-22 RX ORDER — SODIUM,POTASSIUM PHOSPHATES 280-250MG
2 POWDER IN PACKET (EA) ORAL ONCE
Status: COMPLETED | OUTPATIENT
Start: 2025-06-22 | End: 2025-06-22

## 2025-06-22 RX ORDER — GUAIFENESIN 600 MG/1
600 TABLET, EXTENDED RELEASE ORAL 2 TIMES DAILY
Start: 2025-06-22 | End: 2025-07-02

## 2025-06-22 RX ORDER — LEVOFLOXACIN 750 MG/1
750 TABLET, FILM COATED ORAL DAILY
Qty: 3 TABLET | Refills: 0 | Status: SHIPPED | OUTPATIENT
Start: 2025-06-23 | End: 2025-06-26

## 2025-06-22 RX ORDER — LEVALBUTEROL INHALATION SOLUTION 0.63 MG/3ML
1.25 SOLUTION RESPIRATORY (INHALATION) EVERY 6 HOURS
Status: DISCONTINUED | OUTPATIENT
Start: 2025-06-22 | End: 2025-07-07

## 2025-06-22 RX ORDER — OXYCODONE AND ACETAMINOPHEN 5; 325 MG/1; MG/1
1 TABLET ORAL EVERY 4 HOURS PRN
Refills: 0 | Status: DISCONTINUED | OUTPATIENT
Start: 2025-06-23 | End: 2025-07-01

## 2025-06-22 RX ORDER — FLUTICASONE FUROATE AND VILANTEROL 100; 25 UG/1; UG/1
1 POWDER RESPIRATORY (INHALATION) DAILY
Status: DISCONTINUED | OUTPATIENT
Start: 2025-06-22 | End: 2025-06-22 | Stop reason: HOSPADM

## 2025-06-22 RX ORDER — BENZONATATE 100 MG/1
100 CAPSULE ORAL 3 TIMES DAILY PRN
Start: 2025-06-22 | End: 2025-07-02

## 2025-06-22 RX ORDER — LEVALBUTEROL INHALATION SOLUTION 0.63 MG/3ML
1.25 SOLUTION RESPIRATORY (INHALATION) EVERY 4 HOURS PRN
Status: DISCONTINUED | OUTPATIENT
Start: 2025-06-22 | End: 2025-07-07

## 2025-06-22 RX ORDER — METHOCARBAMOL 500 MG/1
500 TABLET, FILM COATED ORAL 3 TIMES DAILY PRN
Status: DISCONTINUED | OUTPATIENT
Start: 2025-06-23 | End: 2025-07-10 | Stop reason: HOSPADM

## 2025-06-22 RX ADMIN — HYDROXYZINE HYDROCHLORIDE 25 MG: 25 TABLET, FILM COATED ORAL at 08:06

## 2025-06-22 RX ADMIN — IPRATROPIUM BROMIDE 0.5 MG: 0.5 SOLUTION RESPIRATORY (INHALATION) at 11:06

## 2025-06-22 RX ADMIN — FLUTICASONE FUROATE AND VILANTEROL TRIFENATATE 1 PUFF: 100; 25 POWDER RESPIRATORY (INHALATION) at 11:06

## 2025-06-22 RX ADMIN — PANTOPRAZOLE SODIUM 40 MG: 40 TABLET, DELAYED RELEASE ORAL at 09:06

## 2025-06-22 RX ADMIN — TIOTROPIUM BROMIDE INHALATION SPRAY 2 PUFF: 3.12 SPRAY, METERED RESPIRATORY (INHALATION) at 11:06

## 2025-06-22 RX ADMIN — Medication 4 ML: at 08:06

## 2025-06-22 RX ADMIN — PREDNISONE 40 MG: 20 TABLET ORAL at 09:06

## 2025-06-22 RX ADMIN — MELATONIN TAB 3 MG 6 MG: 3 TAB at 08:06

## 2025-06-22 RX ADMIN — ATORVASTATIN CALCIUM 80 MG: 40 TABLET, FILM COATED ORAL at 09:06

## 2025-06-22 RX ADMIN — APIXABAN 5 MG: 5 TABLET, FILM COATED ORAL at 09:06

## 2025-06-22 RX ADMIN — GUAIFENESIN 1200 MG: 600 TABLET, MULTILAYER, EXTENDED RELEASE ORAL at 08:06

## 2025-06-22 RX ADMIN — AMIODARONE HYDROCHLORIDE 400 MG: 200 TABLET ORAL at 09:06

## 2025-06-22 RX ADMIN — IPRATROPIUM BROMIDE 0.5 MG: 0.5 SOLUTION RESPIRATORY (INHALATION) at 04:06

## 2025-06-22 RX ADMIN — HYDROXYZINE HYDROCHLORIDE 25 MG: 25 TABLET, FILM COATED ORAL at 10:06

## 2025-06-22 RX ADMIN — POTASSIUM & SODIUM PHOSPHATES POWDER PACK 280-160-250 MG 2 PACKET: 280-160-250 PACK at 09:06

## 2025-06-22 RX ADMIN — LEVETIRACETAM 500 MG: 500 TABLET, FILM COATED ORAL at 09:06

## 2025-06-22 RX ADMIN — IPRATROPIUM BROMIDE 0.5 MG: 0.5 SOLUTION RESPIRATORY (INHALATION) at 08:06

## 2025-06-22 RX ADMIN — AMITRIPTYLINE HYDROCHLORIDE 25 MG: 25 TABLET, FILM COATED ORAL at 08:06

## 2025-06-22 RX ADMIN — BENZONATATE 100 MG: 100 CAPSULE ORAL at 10:06

## 2025-06-22 RX ADMIN — LEVETIRACETAM 500 MG: 500 TABLET, FILM COATED ORAL at 08:06

## 2025-06-22 RX ADMIN — VANCOMYCIN HYDROCHLORIDE 1000 MG: 1 INJECTION, POWDER, LYOPHILIZED, FOR SOLUTION INTRAVENOUS at 08:06

## 2025-06-22 RX ADMIN — APIXABAN 5 MG: 5 TABLET, FILM COATED ORAL at 08:06

## 2025-06-22 RX ADMIN — MIRTAZAPINE 15 MG: 15 TABLET, FILM COATED ORAL at 08:06

## 2025-06-22 RX ADMIN — LEVOFLOXACIN 750 MG: 5 INJECTION, SOLUTION INTRAVENOUS at 12:06

## 2025-06-22 RX ADMIN — IPRATROPIUM BROMIDE 0.5 MG: 0.5 SOLUTION RESPIRATORY (INHALATION) at 01:06

## 2025-06-22 RX ADMIN — LEVALBUTEROL 1.25 MG: 1.25 SOLUTION, CONCENTRATE RESPIRATORY (INHALATION) at 04:06

## 2025-06-22 RX ADMIN — MUPIROCIN: 20 OINTMENT TOPICAL at 09:06

## 2025-06-22 RX ADMIN — LEVALBUTEROL HYDROCHLORIDE 1.25 MG: 0.63 SOLUTION RESPIRATORY (INHALATION) at 08:06

## 2025-06-22 RX ADMIN — LEVALBUTEROL 1.25 MG: 1.25 SOLUTION, CONCENTRATE RESPIRATORY (INHALATION) at 08:06

## 2025-06-22 RX ADMIN — LEVALBUTEROL 1.25 MG: 1.25 SOLUTION, CONCENTRATE RESPIRATORY (INHALATION) at 01:06

## 2025-06-22 NOTE — PLAN OF CARE
Pt tolerated transfer well. Vitals stable. Pain controlled. Refer to flowsheet for assessment.   Problem: Adult Inpatient Plan of Care  Goal: Optimal Comfort and Wellbeing  Outcome: Progressing

## 2025-06-22 NOTE — DISCHARGE SUMMARY
This note has been moved to another encounter. If you have any questions, please contact HIM Chart Correction at [phone].

## 2025-06-22 NOTE — PLAN OF CARE
Problem: Adult Inpatient Plan of Care  Goal: Patient-Specific Goal (Individualized)  Outcome: Met     Problem: Adult Inpatient Plan of Care  Goal: Plan of Care Review  Outcome: Met     Problem: Adult Inpatient Plan of Care  Goal: Optimal Comfort and Wellbeing  Outcome: Met     Problem: Pneumonia  Goal: Effective Oxygenation and Ventilation  Outcome: Met     Problem: Wound  Goal: Optimal Coping  Outcome: Met     Problem: Wound  Goal: Optimal Functional Ability  Outcome: Met

## 2025-06-22 NOTE — HOSPITAL COURSE
Jordin Allen is a 79-year-old male with a history of COPD on home O? (4L), SHOLA, CAD s/p CABG, AFib, and stage IV NSCLC with brain metastases, who was admitted from an LTAC facility for shortness of breath, chest pain, and tachycardia. He was found to be in AFib with RVR (HR ~170s) and acute on chronic hypoxic respiratory failure, requiring escalation to BiPAP and HFNC. He failed initial rate control with IV Diltiazem and became hypotensive, requiring ICU transfer.  In the MICU, he was started on an Amiodarone drip, later transitioned to PO Amiodarone with successful rate control. Apixaban was continued for anticoagulation. Infectious workup revealed CT chest with basilar airspace opacities and concern for aspiration pneumonia; he was treated with Vancomycin, Cefepime, and Levofloxacin, and later de-escalated to oral Levofloxacin to complete the course. RVP, blood cultures, and procalcitonin were negative or non-concerning.  He was gradually weaned from BiPAP to nasal cannula at 3-5L, with improvement in respiratory status. PT/OT evaluated him, recommended moderate-intensity activity, and supported discharge back to LTAC for continued therapy. Psychiatry monitored for anxiety, which was stable without need for pharmacologic intervention. Given new or recurrent tachycardia following inhaled beta-agonists, a decision was made to discontinue Albuterol on discharge.  Condition at Discharge:  Hemodynamically stable  Tolerating diet and activity as tolerated  Oxygen requirement stable on 4L NC  Mentally alert, at clinical baseline  No signs of ongoing infection or decompensation    Follow-up & Recommendations:  Continue oxygen therapy and modulate as needed and CPAP at night  Complete Levofloxacin course  Avoid beta-agonist inhalers (Albuterol) due to tachyarrhythmia risk  Continue Amiodarone and Apixaban; monitor HR and rhythm  Resume home oncology and pulmonology follow-up  Encourage gradual mobilization per PT/OT  plan  Other home medications resumed per LTAC protocol

## 2025-06-22 NOTE — PLAN OF CARE
Problem: Adult Inpatient Plan of Care  Goal: Absence of Hospital-Acquired Illness or Injury  Intervention: Prevent and Manage VTE (Venous Thromboembolism) Risk  Flowsheets (Taken 6/22/2025 0000)  VTE Prevention/Management: ROM (active) performed  Intervention: Prevent Infection  Flowsheets (Taken 6/22/2025 0008)  Infection Prevention: rest/sleep promoted  Goal: Optimal Comfort and Wellbeing  Intervention: Monitor Pain and Promote Comfort  Flowsheets (Taken 6/22/2025 0000)  Pain Management Interventions:   care clustered   pillow support provided   position adjusted  Intervention: Provide Person-Centered Care  Flowsheets (Taken 6/22/2025 0000)  Trust Relationship/Rapport:   care explained   questions encouraged     Problem: Fall Injury Risk  Goal: Absence of Fall and Fall-Related Injury  Intervention: Identify and Manage Contributors  Flowsheets (Taken 6/22/2025 0000)  Medication Review/Management: medications reviewed

## 2025-06-22 NOTE — PLAN OF CARE
[facility]  Discharge Final Note    Primary Care Provider: Sierra Landry MD    Expected Discharge Date: 6/24/2025    Final Discharge Note (most recent)       Final Note - 06/22/25 1617          Final Note    Assessment Type Final Discharge Note (P)      What phone number can be called within the next 1-3 days to see how you are doing after discharge? [phone] (P)         Post-Acute Status    Post-Acute Authorization Placement (P)      Post-Acute Placement Status Set-up Complete/Auth obtained (P)    [facility]                    Important Message from Medicare             Pt. discharged to [facility]. Ambulance transportation arranged via [facility]. SW attempting to contact pt's daughters, Marilou & Marzena (one phone number listed for both daughters) (c) [phone] & a relative, Jigar Grimaldo (c) [phone] with no success to inform them of pt's transfer to [facility].     Bran Corona LMSW

## 2025-06-22 NOTE — DISCHARGE SUMMARY
[facility]  Hospital Medicine  Discharge Summary      Patient Name: Jordin Allen Jr.  MRN: [id number]  ELA: [id number]  Patient Class: IP- Inpatient  Admission Date: 6/3/2025  Hospital Length of Stay: 19 days  Discharge Date and Time: 06/22/2025 4:49 PM  Attending Physician: Janice Dominguez MD   Discharging Provider: Swapna Birmingham MD  Primary Care Provider: Sierra Landry MD    Primary Care Team: Networked reference to record PCT     HPI:   79M PMHx COPD(4L), SHOLA, HTN, CAD, left lung ca (brain mets) s/p chemo/RT/immuno (12/24), GERD, HLD presenting from LTAC with chest pain, shortness of breath, and tachycardia. He was discharged 2 weeks ago after hospitalization for hypoxic respiratory failure due to parainfluenza pneumonia and COPD exacerbation, during which he was started on Apixaban and Diltiazem. Since returning to LTAC, he had persistent respiratory symptoms but acutely developed severe (9/10) chest pain, worsening SOB, and rapid heart rate this morning. Despite being on Diltiazem, he remained tachycardic, and EMS was called. En route, he became hypotensive and was electrically cardioverted, with only transient slowing of rate. He is currently on Cefepime, Vancomycin and levaquin for pneumonia. Of note, he has been admitted to [facility] x3 in the last month for continued SOB and COPD exacerbation. Admitted to  for further management     * No surgery found *      Hospital Course:   Jordin Allen is a 79-year-old male with a history of COPD on home O? (4L), SHOLA, CAD s/p CABG, AFib, and stage IV NSCLC with brain metastases, who was admitted from an LTAC facility for shortness of breath, chest pain, and tachycardia. He was found to be in AFib with RVR (HR ~170s) and acute on chronic hypoxic respiratory failure, requiring escalation to BiPAP and HFNC. He failed initial rate control with IV Diltiazem and became hypotensive, requiring ICU transfer.  In the MICU, he was started on an Amiodarone  drip, later transitioned to PO Amiodarone with successful rate control. Apixaban was continued for anticoagulation. Infectious workup revealed CT chest with basilar airspace opacities and concern for aspiration pneumonia; he was treated with Vancomycin, Cefepime, and Levofloxacin, and later de-escalated to oral Levofloxacin to complete the course. RVP, blood cultures, and procalcitonin were negative or non-concerning.  He was gradually weaned from BiPAP to nasal cannula at 3-5L, with improvement in respiratory status. PT/OT evaluated him, recommended moderate-intensity activity, and supported discharge back to LTAC for continued therapy. Psychiatry monitored for anxiety, which was stable without need for pharmacologic intervention. Given new or recurrent tachycardia following inhaled beta-agonists, a decision was made to discontinue Albuterol on discharge.  Condition at Discharge:  Hemodynamically stable  Tolerating diet and activity as tolerated  Oxygen requirement stable on 4L NC  Mentally alert, at clinical baseline  No signs of ongoing infection or decompensation    Follow-up & Recommendations:  Continue oxygen therapy and modulate as needed and CPAP at night  Complete Levofloxacin course  Avoid beta-agonist inhalers (Albuterol) due to tachyarrhythmia risk  Continue Amiodarone and Apixaban; monitor HR and rhythm  Resume home oncology and pulmonology follow-up  Encourage gradual mobilization per PT/OT plan  Other home medications resumed per LTAC protocol       Physical Exam  Vitals reviewed.   Constitutional:       General: He is not in acute distress.     Appearance: He is ill-appearing. He is not toxic-appearing.   HENT:      Head: Normocephalic and atraumatic.   Eyes:      General: No scleral icterus.        Right eye: No discharge.         Left eye: No discharge.      Extraocular Movements: Extraocular movements intact.      Conjunctiva/sclera: Conjunctivae normal.      Pupils: Pupils are equal, round, and  "reactive to light.   Cardiovascular:      Rate and Rhythm: Tachycardia present.      Pulses: Normal pulses.      Heart sounds: Normal heart sounds. No murmur heard.  Pulmonary:      Effort: Respiratory distress present.      Breath sounds: No stridor. Rhonchi present. No wheezing or rales.   Chest:      Chest wall: No tenderness.   Abdominal:      General: Abdomen is flat. There is no distension.      Palpations: There is no mass.      Tenderness: There is no abdominal tenderness.      Hernia: No hernia is present.   Musculoskeletal:      Right lower leg: No edema.      Left lower leg: No edema.   Skin:     General: Skin is warm and dry.      Coloration: Skin is not jaundiced or pale.      Findings: No erythema or rash.   Neurological:      Mental Status: He is alert and oriented to person, place, and time.   Psychiatric:         Mood and Affect: Mood normal.         Behavior: Behavior normal.         Goals of Care Treatment Preferences:  Code Status: DNR       POLST: Yes  What is most important right now is to focus on remaining as independent as possible, symptom/pain control, improvement in condition but with limits to invasive therapies.  Accordingly, we have decided that the best plan to meet the patient's goals includes continuing with treatment.         Consults:   Consults (From admission, onward)          Status Ordering Provider     Pharmacy to dose Vancomycin consult  Once        Provider:  (Not yet assigned)   Placed in "And" Linked Group    Acknowledged ROSAURA FERRARI     Inpatient consult to Midline team  Once        Provider:  (Not yet assigned)    Completed ABBIE OLIVA            Parainfluenza virus infection      Final Active Diagnoses:    Diagnosis Date Noted POA    PRINCIPAL PROBLEM:  Acute on chronic respiratory failure with hypoxia [J96.21] 12/04/2022 Yes    Parainfluenza virus infection [B34.8] 05/26/2025 Yes    Pneumonia of right lung due to infectious organism [J18.9] 05/23/2025 Yes    " Cellulitis [L03.90] 05/22/2025 Yes    TIA (transient ischemic attack) [G45.9] 02/08/2025 Yes    Metastasis to brain [C79.31] 07/30/2024 Yes    Adenocarcinoma, lung, left [C34.92] 04/02/2024 Yes    Dyslipidemia [E78.5] 11/07/2019 Yes    COPD with acute exacerbation [J44.1]  Yes    CAD (coronary artery disease) [I25.10] 10/31/2013 Yes     Chronic    HTN (hypertension), benign [I10] 09/07/2012 Yes    SHOLA (obstructive sleep apnea) [G47.33] 09/07/2012 Yes    GERD (gastroesophageal reflux disease) [K21.9] 08/23/2012 Yes      Problems Resolved During this Admission:       Discharged Condition: stable    Disposition:     Follow Up:    Patient Instructions:   No discharge procedures on file.    Significant Diagnostic Studies: Labs: CMP   Recent Labs   Lab 06/21/25  0448 06/22/25  0759    145   K 4.1 4.5    106   CO2 27 27   * 90   BUN 41* 35*   CREATININE 1.2 1.0   CALCIUM 8.9 9.7   PROT 5.1* 5.6*   ALBUMIN 1.9* 2.3*   BILITOT 0.4 0.5   ALKPHOS 100 112   AST 53* 48*   ALT 84* 95*   ANIONGAP 9 12    and CBC   Recent Labs   Lab 06/21/25  0448 06/22/25  0759   WBC 10.60 11.22   HGB 8.9* 10.3*   HCT 27.3* 33.0*    235       Pending Diagnostic Studies:       Procedure Component Value Units Date/Time    VANCOMYCIN, TROUGH [[id number]]     Order Status: Sent Lab Status: No result     Specimen: Blood            Medications:  Reconciled Home Medications:      Medication List        ASK your doctor about these medications      acetaminophen 500 MG tablet  Commonly known as: TYLENOL  Take 500 mg by mouth 2 (two) times daily as needed for Pain.     * albuterol 0.63 mg/3 mL Nebu  Commonly known as: ACCUNEB  Inhale 3 mLs (0.63 mg total) by nebulization every 6 (six) hours as needed (if symptoms failed to improve with inhaler). Rescue     * albuterol 90 mcg/actuation inhaler  Commonly known as: PROVENTIL/VENTOLIN HFA  Inhale 2 puffs into the lungs every 4 (four) hours as needed for Wheezing.     amiodarone 200 MG  Tab  Commonly known as: PACERONE  Take 2 tablets (400 mg total) by mouth 2 (two) times daily for 8 days, THEN 1 tablet (200 mg total) once daily. To be further reviewed by Cardiology outpatient.  Start taking on: June 22, 2025     amitriptyline 25 MG tablet  Commonly known as: ELAVIL  Take 1 tablet (25 mg total) by mouth once daily.     apixaban 5 mg Tab  Commonly known as: ELIQUIS  Take 1 tablet (5 mg total) by mouth 2 (two) times daily.     aspirin 81 MG EC tablet  Commonly known as: ECOTRIN  Take 81 mg by mouth every morning.     atorvastatin 80 MG tablet  Commonly known as: LIPITOR  Take 1 tablet (80 mg total) by mouth in the morning.     benzonatate 100 MG capsule  Commonly known as: TESSALON  Take 1 capsule (100 mg total) by mouth 3 (three) times daily as needed for Cough.     BREZTRI AEROSPHERE 160-9-4.8 mcg/actuation [facility]  Generic drug: budesonide-glycopyr-formoterol  Inhale 2 puffs into the lungs 2 (two) times a day.     dexAMETHasone 4 MG Tab  Commonly known as: DECADRON  Take 1 tablet (4 mg total) by mouth 2 (two) times daily.     echinacea 400 mg Cap  Take 1 capsule by mouth once daily.     fluticasone propionate 50 mcg/actuation nasal spray  Commonly known as: FLONASE  Spray 2 sprays (100 mcg total) in Each Nostril once daily.     furosemide 20 MG tablet  Commonly known as: LASIX  Take 1 tablet (20 mg total) by mouth once daily.     guaiFENesin 600 mg 12 hr tablet  Commonly known as: MUCINEX  Take 1 tablet (600 mg total) by mouth 2 (two) times daily. for 10 days     ibuprofen 200 MG tablet  Commonly known as: ADVIL,MOTRIN  Take 200 mg by mouth every 8 (eight) hours as needed for Pain.     ipratropium 0.02 % nebulizer solution  Commonly known as: ATROVENT  Take 2.5 mLs (500 mcg total) by nebulization every 6 (six) hours as needed for Wheezing. Rescue     latanoprost 0.005 % ophthalmic solution  Instill 1 drop into both eyes every night at bedtime     levETIRAcetam 500 MG Tab  Commonly known as:  KEPPRA  Take 1 tablet (500 mg total) by mouth 2 (two) times daily.     levoFLOXacin 750 MG tablet  Commonly known as: LEVAQUIN  Take 1 tablet (750 mg total) by mouth once daily. for 3 days  Start taking on: June 23, 2025     losartan 100 MG tablet  Commonly known as: COZAAR  Take 1 tablet (100 mg total) by mouth once daily.     melatonin 3 mg tablet  Commonly known as: MELATIN  Take 2 tablets (6 mg total) by mouth nightly as needed for Insomnia.     nitroGLYCERIN 0.4 MG SL tablet  Commonly known as: NITROSTAT  Place 1 tablet (0.4 mg total) under the tongue every 5 (five) minutes as needed.     OCUVITE ORAL  Take 1 capsule by mouth once daily.     omeprazole 20 MG capsule  Commonly known as: PRILOSEC  Take 20 mg by mouth once daily.     polyethylene glycol 17 gram/dose powder  Commonly known as: GLYCOLAX  Take 17 grams by mouth every day for constipation prevention     predniSONE 50 MG Tab  Commonly known as: DELTASONE  Take 1 tablet (50 mg total) by mouth once daily. for 2 days  Ask about: Should I take this medication?     roflumilast 500 mcg Tab  Commonly known as: DALIRESP  Take 500 mcg by mouth every morning.     SENNA ORAL  Take 1 tablet by mouth daily as needed (Constipation).     traZODone 50 MG tablet  Commonly known as: DESYREL  Take 1 tablet (50 mg total) by mouth every evening.           * This list has 2 medication(s) that are the same as other medications prescribed for you. Read the directions carefully, and ask your doctor or other care provider to review them with you.                  Indwelling Lines/Drains at time of discharge:   Lines/Drains/Airways       None                             Time spent on the discharge of patient: 30 minutes         Swapna Birmingham MD  Department of Hospital Medicine  [facility]

## 2025-06-22 NOTE — PLAN OF CARE
[facility]    FACILITY TRANSFER ORDERS      Patient Name: Jordin Allen Jr.  YOB: 1945    PCP: Sierra Landry MD   PCP Address: [address] [facility] / [address]  PCP Phone Number: [phone]  PCP Fax: [phone]    Encounter Date: 06/22/2025    Admit to: [facility]    Vital Signs:  Routine    Diagnoses:   Active Hospital Problems    Diagnosis  POA    *Acute on chronic respiratory failure with hypoxia [J96.21]  Yes    Irritant contact dermatitis due to fecal, urinary or dual incontinence [L24.A2]  Yes    Atrial fibrillation with RVR [I48.91]  Unknown    Anxiety [F41.9]  Yes    Metastasis to brain [C79.31]  Yes    Adenocarcinoma, lung, 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       CAD (coronary artery disease) [I25.10]  Yes     Chronic     -LHC (10/31/13) for stemi: pLAD 100%, Cx with Li's, mRCA 40%, LVEF 55%,. LVEDP 15   -Xience 3.0 x 33 mm to pLAD, post dilated with 3.5 NC and 4.0 NC.   -TTE (8/14/13): LVEF 55%, grade I diastolic dysfunction      HTN (hypertension), benign [I10]  Yes    GERD (gastroesophageal reflux disease) [K21.9]  Yes     Continue PPI        Resolved Hospital Problems    Diagnosis Date Resolved POA    Hypotension [I95.9] 06/18/2025 Yes       Allergies:  Review of patient's allergies indicates:   Allergen Reactions    Brilinta [ticagrelor] Itching    Lisinopril      Other reaction(s): cough    Metoprolol succinate Rash       Diet: soft diet    Activities: Activity as tolerated, Ambulate in thomas, and Weight bearing as tolerated    Goals of Care Treatment Preferences:  Code Status: DNR       POLST: Yes  What is most important right now is to focus on remaining as independent as possible, symptom/pain control, improvement in condition but with limits to invasive therapies.  Accordingly, we have  decided that the best plan to meet the patient's goals includes continuing with treatment.      Nursing: per facility protocol    Labs: per facility protocol     CONSULTS:    Physical Therapy to evaluate and treat. , Occupational Therapy to evaluate and treat., and  to evaluate for community resources/long-range planning.    MISCELLANEOUS CARE:  Oxygen via NC to maintain O2 88-92%  On 2L chronically    BIPAP qhs     WOUND CARE ORDERS  Yes: Perineal rash and moisture-associated dermatitis of the sacral spine  PRN - nose w/ bipap use - cleanse gently w/ NS, pat dry and apply Mepilex foam dressing, change PRN for soilage, otherwise, daily when pt is on BiPap.    BID & PRN - buttocks, sacrum - cleanse gently w/ no rinse bath wipes, pat dry and apply Triad to affected area    Medications: Review discharge medications with patient and family and provide education.         Medication List        START taking these medications      amiodarone 200 MG Tab  Commonly known as: PACERONE  Take 2 tablets (400 mg total) by mouth 2 (two) times daily for 8 days, THEN 1 tablet (200 mg total) once daily. To be further reviewed by Cardiology outpatient.  Start taking on: June 22, 2025     levoFLOXacin 750 MG tablet  Commonly known as: LEVAQUIN  Take 1 tablet (750 mg total) by mouth once daily. for 3 days  Start taking on: June 23, 2025     melatonin 3 mg tablet  Commonly known as: MELATIN  Take 2 tablets (6 mg total) by mouth nightly as needed for Insomnia.            CHANGE how you take these medications      amitriptyline 25 MG tablet  Commonly known as: ELAVIL  Take 1 tablet (25 mg total) by mouth once daily.  What changed: when to take this            CONTINUE taking these medications      acetaminophen 500 MG tablet  Commonly known as: TYLENOL  Take 500 mg by mouth 2 (two) times daily as needed for Pain.     apixaban 5 mg Tab  Commonly known as: ELIQUIS  Take 1 tablet (5 mg total) by mouth 2 (two) times daily.      aspirin 81 MG EC tablet  Commonly known as: ECOTRIN  Take 81 mg by mouth every morning.     atorvastatin 80 MG tablet  Commonly known as: LIPITOR  Take 1 tablet (80 mg total) by mouth in the morning.     benzonatate 100 MG capsule  Commonly known as: TESSALON  Take 1 capsule (100 mg total) by mouth 3 (three) times daily as needed for Cough.     BREZTRI AEROSPHERE 160-9-4.8 mcg/actuation [facility]  Generic drug: budesonide-glycopyr-formoterol  Inhale 2 puffs into the lungs 2 (two) times a day.     * dextrose 50% injection  Inject 25 mLs (12.5 g total) into the vein as needed (between 50 - 70 mg/dL if patient cannnot take PO but has IV access.).     * dextrose 50% injection  Inject 50 mLs (25 g total) into the vein as needed (less than 50 mg/dL if patient cannnot take PO but has IV access.).     echinacea 400 mg Cap  Take 1 capsule by mouth once daily.     fluticasone propionate 50 mcg/actuation nasal spray  Commonly known as: FLONASE  Spray 2 sprays (100 mcg total) in Each Nostril once daily.     * glucose 4 GM chewable tablet  Take 4 tablets (16 g total) by mouth as needed.     * glucose 4 GM chewable tablet  Take 6 tablets (24 g total) by mouth as needed.     guaiFENesin 600 mg 12 hr tablet  Commonly known as: MUCINEX  Take 1 tablet (600 mg total) by mouth 2 (two) times daily. for 10 days     ipratropium 0.02 % nebulizer solution  Commonly known as: ATROVENT  Take 2.5 mLs (500 mcg total) by nebulization every 6 (six) hours as needed for Wheezing. Rescue     latanoprost 0.005 % ophthalmic solution  Instill 1 drop into both eyes every night at bedtime     levETIRAcetam 500 MG Tab  Commonly known as: KEPPRA  Take 1 tablet (500 mg total) by mouth 2 (two) times daily.     losartan 100 MG tablet  Commonly known as: COZAAR  Take 1 tablet (100 mg total) by mouth once daily.     OCUVITE ORAL  Take 1 capsule by mouth once daily.     omeprazole 20 MG capsule  Commonly known as: PRILOSEC  Take 20 mg by mouth once daily.      polyethylene glycol 17 gram/dose powder  Commonly known as: GLYCOLAX  Take 17 grams by mouth every day for constipation prevention     roflumilast 500 mcg Tab  Commonly known as: DALIRESP  Take 500 mcg by mouth every morning.     traZODone 50 MG tablet  Commonly known as: DESYREL  Take 1 tablet (50 mg total) by mouth every evening.           * This list has 4 medication(s) that are the same as other medications prescribed for you. Read the directions carefully, and ask your doctor or other care provider to review them with you.                STOP taking these medications      0.9% NaCl SolP 250 mL with vancomycin 1,000 mg SolR 1,000 mg     D5W PgBk 100 mL with piperacillin-tazobactam 4.5 gram SolR 4.5 g     diltiaZEM 180 MG 24 hr capsule  Commonly known as: CARDIZEM CD                Immunizations Administered as of 6/22/2025       Name Date Dose VIS Date Route Exp Date    COVID-19, MRNA, LN-S, PF (Pfizer) (Purple Cap) 8/26/2021 0.3 mL -- Intramuscular --    Site: Left deltoid     : [employer]     Lot: [id number]     Comment: Adminis     COVID-19, MRNA, LN-S, PF (Pfizer) (Purple Cap) 2/2/2021  7:23 AM 0.3 mL 12/12/2020 Intramuscular 5/31/2021    Site: Left deltoid     Given By: Roberto Gay RN     : [employer]     Lot: [id number]     COVID-19, MRNA, LN-S, PF (Pfizer) (Purple Cap) 1/12/2021 11:22 AM 0.3 mL 12/12/2020 Intramuscular 4/30/2021    Site: Left deltoid     Given By: Roberto Gay RN     : Pf[employer]     Lot: [id number]             This patient has had both covid vaccinations    Some patients may experience side effects after vaccination.  These may include fever, headache, muscle or joint aches.  Most symptoms resolve with 24-48 hours and do not require urgent medical evaluation unless they persist for more than 72 hours or symptoms are concerning for an unrelated medical condition.          _________________________________  Swapna Birmingham MD  Internal  Medicine, PGY-1  06/22/2025

## 2025-06-23 ENCOUNTER — DOCUMENTATION ONLY (OUTPATIENT)
Dept: RADIATION ONCOLOGY | Facility: CLINIC | Age: 80
End: 2025-06-23
Payer: MEDICARE

## 2025-06-23 LAB
ABSOLUTE NEUTROPHIL MANUAL (OHS): 11.6 K/UL
ALBUMIN SERPL BCP-MCNC: 2.1 G/DL (ref 3.5–5.2)
ALP SERPL-CCNC: 120 UNIT/L (ref 40–150)
ALT SERPL W/O P-5'-P-CCNC: 89 UNIT/L (ref 10–44)
ANION GAP (OHS): 9 MMOL/L (ref 8–16)
AST SERPL-CCNC: 42 UNIT/L (ref 11–45)
BACTERIA BLD CULT: NORMAL
BACTERIA BLD CULT: NORMAL
BILIRUB SERPL-MCNC: 0.6 MG/DL (ref 0.1–1)
BUN SERPL-MCNC: 35 MG/DL (ref 8–23)
CALCIUM SERPL-MCNC: 9.2 MG/DL (ref 8.7–10.5)
CHLORIDE SERPL-SCNC: 107 MMOL/L (ref 95–110)
CO2 SERPL-SCNC: 27 MMOL/L (ref 23–29)
CREAT SERPL-MCNC: 0.9 MG/DL (ref 0.5–1.4)
ERYTHROCYTE [DISTWIDTH] IN BLOOD BY AUTOMATED COUNT: 17.4 % (ref 11.5–14.5)
GFR SERPLBLD CREATININE-BSD FMLA CKD-EPI: >60 ML/MIN/1.73/M2
GLUCOSE SERPL-MCNC: 159 MG/DL (ref 70–110)
HCT VFR BLD AUTO: 33.3 % (ref 40–54)
HGB BLD-MCNC: 10.2 GM/DL (ref 14–18)
LYMPHOCYTES NFR BLD MANUAL: 1 % (ref 18–48)
MAGNESIUM SERPL-MCNC: 2.1 MG/DL (ref 1.6–2.6)
MCH RBC QN AUTO: 27.7 PG (ref 27–31)
MCHC RBC AUTO-ENTMCNC: 30.6 G/DL (ref 32–36)
MCV RBC AUTO: 91 FL (ref 82–98)
MONOCYTES NFR BLD MANUAL: 2 % (ref 4–15)
NEUTROPHILS NFR BLD MANUAL: 96 % (ref 38–73)
NEUTS BAND NFR BLD MANUAL: 1 %
NUCLEATED RBC (/100WBC) (OHS): 0 /100 WBC
OVALOCYTES BLD QL SMEAR: ABNORMAL
PHOSPHATE SERPL-MCNC: 2.4 MG/DL (ref 2.7–4.5)
PLATELET # BLD AUTO: 206 K/UL (ref 150–450)
PMV BLD AUTO: 10.2 FL (ref 9.2–12.9)
POTASSIUM SERPL-SCNC: 4.6 MMOL/L (ref 3.5–5.1)
PROT SERPL-MCNC: 5.3 GM/DL (ref 6–8.4)
RBC # BLD AUTO: 3.68 M/UL (ref 4.6–6.2)
SODIUM SERPL-SCNC: 143 MMOL/L (ref 136–145)
WBC # BLD AUTO: 11.91 K/UL (ref 3.9–12.7)

## 2025-06-23 PROCEDURE — 25000242 PHARM REV CODE 250 ALT 637 W/ HCPCS: Performed by: FAMILY MEDICINE

## 2025-06-23 PROCEDURE — 94640 AIRWAY INHALATION TREATMENT: CPT

## 2025-06-23 PROCEDURE — 25000003 PHARM REV CODE 250: Performed by: HOSPITALIST

## 2025-06-23 PROCEDURE — 36415 COLL VENOUS BLD VENIPUNCTURE: CPT | Performed by: STUDENT IN AN ORGANIZED HEALTH CARE EDUCATION/TRAINING PROGRAM

## 2025-06-23 PROCEDURE — 99900031 HC PATIENT EDUCATION (STAT)

## 2025-06-23 PROCEDURE — 99900035 HC TECH TIME PER 15 MIN (STAT)

## 2025-06-23 PROCEDURE — 97165 OT EVAL LOW COMPLEX 30 MIN: CPT

## 2025-06-23 PROCEDURE — 11000001 HC ACUTE MED/SURG PRIVATE ROOM

## 2025-06-23 PROCEDURE — 27000173 HC ACAPELLA DEVICE DH OR DM

## 2025-06-23 PROCEDURE — 97530 THERAPEUTIC ACTIVITIES: CPT

## 2025-06-23 PROCEDURE — 25000242 PHARM REV CODE 250 ALT 637 W/ HCPCS: Performed by: STUDENT IN AN ORGANIZED HEALTH CARE EDUCATION/TRAINING PROGRAM

## 2025-06-23 PROCEDURE — 25000003 PHARM REV CODE 250: Performed by: NURSE PRACTITIONER

## 2025-06-23 PROCEDURE — 94664 DEMO&/EVAL PT USE INHALER: CPT

## 2025-06-23 PROCEDURE — 25000242 PHARM REV CODE 250 ALT 637 W/ HCPCS

## 2025-06-23 PROCEDURE — 84100 ASSAY OF PHOSPHORUS: CPT | Performed by: STUDENT IN AN ORGANIZED HEALTH CARE EDUCATION/TRAINING PROGRAM

## 2025-06-23 PROCEDURE — 97535 SELF CARE MNGMENT TRAINING: CPT

## 2025-06-23 PROCEDURE — 25000003 PHARM REV CODE 250

## 2025-06-23 PROCEDURE — 25000003 PHARM REV CODE 250: Performed by: STUDENT IN AN ORGANIZED HEALTH CARE EDUCATION/TRAINING PROGRAM

## 2025-06-23 PROCEDURE — 83735 ASSAY OF MAGNESIUM: CPT | Performed by: STUDENT IN AN ORGANIZED HEALTH CARE EDUCATION/TRAINING PROGRAM

## 2025-06-23 PROCEDURE — 94761 N-INVAS EAR/PLS OXIMETRY MLT: CPT

## 2025-06-23 PROCEDURE — 80053 COMPREHEN METABOLIC PANEL: CPT | Performed by: STUDENT IN AN ORGANIZED HEALTH CARE EDUCATION/TRAINING PROGRAM

## 2025-06-23 PROCEDURE — 97164 PT RE-EVAL EST PLAN CARE: CPT

## 2025-06-23 PROCEDURE — 27000221 HC OXYGEN, UP TO 24 HOURS

## 2025-06-23 PROCEDURE — 85007 BL SMEAR W/DIFF WBC COUNT: CPT | Performed by: STUDENT IN AN ORGANIZED HEALTH CARE EDUCATION/TRAINING PROGRAM

## 2025-06-23 RX ORDER — AMIODARONE HYDROCHLORIDE 100 MG/1
400 TABLET ORAL 2 TIMES DAILY
Status: COMPLETED | OUTPATIENT
Start: 2025-06-23 | End: 2025-06-30

## 2025-06-23 RX ORDER — AMIODARONE HYDROCHLORIDE 200 MG/1
200 TABLET ORAL DAILY
Status: DISCONTINUED | OUTPATIENT
Start: 2025-07-01 | End: 2025-07-10 | Stop reason: HOSPADM

## 2025-06-23 RX ORDER — LEVOFLOXACIN 750 MG/1
750 TABLET, FILM COATED ORAL DAILY
Status: COMPLETED | OUTPATIENT
Start: 2025-06-23 | End: 2025-06-25

## 2025-06-23 RX ORDER — SODIUM,POTASSIUM PHOSPHATES 280-250MG
2 POWDER IN PACKET (EA) ORAL ONCE
Status: COMPLETED | OUTPATIENT
Start: 2025-06-23 | End: 2025-06-23

## 2025-06-23 RX ADMIN — MELATONIN TAB 3 MG 6 MG: 3 TAB at 09:06

## 2025-06-23 RX ADMIN — HYDROXYZINE HYDROCHLORIDE 25 MG: 25 TABLET, FILM COATED ORAL at 09:06

## 2025-06-23 RX ADMIN — LEVALBUTEROL HYDROCHLORIDE 1.25 MG: 0.63 SOLUTION RESPIRATORY (INHALATION) at 12:06

## 2025-06-23 RX ADMIN — AMITRIPTYLINE HYDROCHLORIDE 25 MG: 25 TABLET, FILM COATED ORAL at 09:06

## 2025-06-23 RX ADMIN — AMIODARONE HYDROCHLORIDE 400 MG: 100 TABLET ORAL at 09:06

## 2025-06-23 RX ADMIN — FLUTICASONE PROPIONATE 100 MCG: 50 SPRAY, METERED NASAL at 08:06

## 2025-06-23 RX ADMIN — ASPIRIN 81 MG: 81 TABLET, COATED ORAL at 08:06

## 2025-06-23 RX ADMIN — HYDROXYZINE HYDROCHLORIDE 25 MG: 25 TABLET, FILM COATED ORAL at 08:06

## 2025-06-23 RX ADMIN — TIOTROPIUM BROMIDE INHALATION SPRAY 2 PUFF: 3.12 SPRAY, METERED RESPIRATORY (INHALATION) at 08:06

## 2025-06-23 RX ADMIN — BENZONATATE 100 MG: 100 CAPSULE ORAL at 08:06

## 2025-06-23 RX ADMIN — BENZONATATE 100 MG: 100 CAPSULE ORAL at 09:06

## 2025-06-23 RX ADMIN — APIXABAN 5 MG: 5 TABLET, FILM COATED ORAL at 08:06

## 2025-06-23 RX ADMIN — Medication 4 ML: at 07:06

## 2025-06-23 RX ADMIN — ROFLUMILAST 500 MCG: 500 TABLET ORAL at 08:06

## 2025-06-23 RX ADMIN — LEVALBUTEROL HYDROCHLORIDE 1.25 MG: 0.63 SOLUTION RESPIRATORY (INHALATION) at 07:06

## 2025-06-23 RX ADMIN — ATORVASTATIN CALCIUM 80 MG: 80 TABLET, FILM COATED ORAL at 08:06

## 2025-06-23 RX ADMIN — LEVOFLOXACIN 750 MG: 750 TABLET, FILM COATED ORAL at 11:06

## 2025-06-23 RX ADMIN — LEVALBUTEROL HYDROCHLORIDE 1.25 MG: 0.63 SOLUTION RESPIRATORY (INHALATION) at 01:06

## 2025-06-23 RX ADMIN — LEVETIRACETAM 500 MG: 500 TABLET, FILM COATED ORAL at 08:06

## 2025-06-23 RX ADMIN — GUAIFENESIN 1200 MG: 600 TABLET, MULTILAYER, EXTENDED RELEASE ORAL at 08:06

## 2025-06-23 RX ADMIN — GUAIFENESIN 1200 MG: 600 TABLET, MULTILAYER, EXTENDED RELEASE ORAL at 09:06

## 2025-06-23 RX ADMIN — LEVETIRACETAM 500 MG: 500 TABLET, FILM COATED ORAL at 09:06

## 2025-06-23 RX ADMIN — MIRTAZAPINE 15 MG: 15 TABLET, FILM COATED ORAL at 09:06

## 2025-06-23 RX ADMIN — Medication: at 09:06

## 2025-06-23 RX ADMIN — ONDANSETRON 4 MG: 4 TABLET, ORALLY DISINTEGRATING ORAL at 08:06

## 2025-06-23 RX ADMIN — AMIODARONE HYDROCHLORIDE 400 MG: 100 TABLET ORAL at 08:06

## 2025-06-23 RX ADMIN — PANTOPRAZOLE SODIUM 40 MG: 40 TABLET, DELAYED RELEASE ORAL at 08:06

## 2025-06-23 RX ADMIN — APIXABAN 5 MG: 5 TABLET, FILM COATED ORAL at 09:06

## 2025-06-23 RX ADMIN — POTASSIUM & SODIUM PHOSPHATES POWDER PACK 280-160-250 MG 2 PACKET: 280-160-250 PACK at 11:06

## 2025-06-23 NOTE — CARE UPDATE
06/22/25 2000   Patient Assessment/Suction   Level of Consciousness (AVPU) alert   Respiratory Effort Unlabored   Expansion/Accessory Muscles/Retractions no retractions;no use of accessory muscles   All Lung Fields Breath Sounds coarse;diminished   Skin Integrity   $ Wound Care Tech Time 15 min   Area Observed Left;Right;Behind ear   Skin Appearance without discoloration   PRE-TX-O2   Device (Oxygen Therapy) nasal cannula with humidification   $ Is the patient on Low Flow Oxygen? Yes   SpO2 99 %   Pulse Oximetry Type Continuous   $ Pulse Oximetry - Multiple Charge Pulse Oximetry - Multiple   Probe Placed On (Pulse Ox) Right:;finger   Oximetry Probe Status Assessed;Intact   Pulse 87   Resp 18   Positioning HOB elevated 30 degrees   Aerosol Therapy   $ Aerosol Therapy Charges Aerosol Treatment   Daily Review of Necessity (SVN) completed   Respiratory Treatment Status (SVN) given   Treatment Route (SVN) air;mask;oxygen   Patient Position HOB elevated   Post Treatment Assessment (SVN) breath sounds unchanged   Signs of Intolerance (SVN) other (see comments)   Breath Sounds Post-Respiratory Treatment   Throughout All Fields Post-Treatment All Fields   Throughout All Fields Post-Treatment aeration increased   Post-treatment Heart Rate (beats/min) 92   Post-treatment Resp Rate (breaths/min) 25   Vibratory PEP Therapy   $ Vibratory PEP Charges Acapella Therapy   $ Vibratory PEP Equipment Blue Acapella - equipment   $ Vibratory PEP Tech Time Charges 15 min   Daily Review of Necessity (PEP Therapy) completed   Type (PEP Therapy) vibratory/oscillatory   Device (PEP Therapy) Acapella low (blue)   Route (PEP Therapy) mouthpiece   Breaths per Cycle (PEP Therapy) 10   Cycles (PEP Therapy) 1   Settings (PEP Therapy) PEP 5   Patient Position (PEP Therapy) HOB elevated   Post Treatment Assessment (PEP) breath sounds unchanged   Signs of Intolerance (PEP Therapy) none   General Safety Checklist   Safety Promotion/Fall Prevention  side rails raised   Airway Safety   Is Ambu Bag and Mask with Patient? Yes, Adult Ambu Bag and Mask   O2  at bedside? No   Suction set is at the bedside? Yes   Communication board is with the patient? Yes   Respiratory Interventions   Cough And Deep Breathing done with encouragement   Preset CPAP/BiPAP Settings   Mode Of Delivery CPAP;home unit;standby   CPAP/BIPAP charged w/in last 24 h NO   Patient CPAP/BiPAP Settings   CPAP/BIPAP ID HOME UNIT  (CPAP)   Education   $ Education 15 min;Other (see comment);Oxygen  (CPAP HOME UNIT)   Transcutaneous Monitor   $ Care Plan Tech Time 15 min   Respiratory Evaluation   $ Care Plan Tech Time 15 min

## 2025-06-23 NOTE — PROGRESS NOTES
[facility] Medicine   Progress Note  Room: 207/207   Patient Name: Jordin Allen Jr.  MRN: [id number]  Admit Date: 6/3/2025   Length of Stay:  LOS: 20 days   Attending Physician: Bernardino Guthrie MD  Nurse Practitioner: Ernestine Palomino NP    Date of Service: 06/23/2025    Principal Problem:    Acute on chronic respiratory failure with hypoxia    Brief HPI:     Jordin Allen Jr. is a 79 y.o. 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  He presented to [facility] ED on 01/22/2025 with complaints of shortness of breath and left foot redness.  Admitted to hospital medicine team for left foot cellulitis and acute hypoxic respiratory failure secondary to pneumonia/COPD exacerbation. CTA chest negative for PE. Emphysematous change with superimposed edema. Nodular focus within the anterior aspect of left upper lobe.  Started on nebs, prednisone, and empiric vanc/cefepime and doxycycline.  Respiratory panel positive for parainfluenza virus.  Course further complicated by sinus tach with PACs/18, for which he was started on diltiazem.  Blood cultures positive for staph, suspected to be contaminant.  Repeat blood cultures returned negative.  He will switch to Augmentin and doxy.  Ongoing episodes of SVT, thought to be due to underlying hypoxia.  He was able to be weaned down to high-flow nasal cannula 15 L.  Discharged to [facility] on 06/03/2025 for rehab, wound care, and O2 weaning.     6/9-6/11-sent out to [facility] for concerns of MI. Workup revealed findings concerning for pneumonia on CT. Started on empiric vanc and zosyn. Also started on 5 day course of prednisone for COPD exacerbation.    6/18 - 6/22-sent out to [facility] again for increasing O2 requirements and worsening AFib with RVR on diltiazem drip.  Use switch to amiodarone drip at [facility],  and transitioned to p.o. amiodarone successfully.  Infectious workup revealed concerns for aspiration pneumonia.  He was treated with vanc, cefepime, and levofloxacin.  Antibiotics de-escalated to oral levofloxacin.  He was weaned from BiPAP to nasal cannula at 3-5 L.      Interval History    Returned to [facility] last night.  Heart rate stable this morning, ranging 75-84   O2 requirements are stable, currently on 5 L nasal cannula.  Reports no concerns at this time.  Talking on phone with a friend.    Labs reviewed and listed below, no critical values   Phosphorus is 2.4 today, replacing with Neutra-Phos 2 packets x1 dose  Remains afebrile   Continue levofloxacin, completes course on 06/26        Subjective:     Review of Systems   Constitutional:  Positive for malaise/fatigue.   Respiratory:  Negative for cough, sputum production and shortness of breath.    Cardiovascular:  Negative for chest pain and leg swelling.   Gastrointestinal:  Negative for heartburn, nausea and vomiting.   Genitourinary:  Negative for dysuria and urgency.   Musculoskeletal:  Positive for joint pain (R shoulder). Negative for myalgias.   Neurological:  Positive for weakness. Negative for dizziness.   Psychiatric/Behavioral:  Negative for depression. The patient does not have insomnia.          Objective:     Vital Sign Range  Temp:  [97.3 °F (36.3 °C)-98.1 °F (36.7 °C)]   Pulse:  []   Resp:  [17-28]   BP: (136-191)/(77-97)   SpO2:  [90 %-100 %]     Body mass index is 28.71 kg/m².           Intake/Output Summary (Last 24 hours) at 6/23/2025 1023  Last data filed at 6/23/2025 0356  Gross per 24 hour   Intake 490 ml   Output 575 ml   Net -85 ml       Physical Exam  Constitutional:       Appearance: He is ill-appearing.   HENT:      Head: Normocephalic and atraumatic.      Mouth/Throat:      Mouth: Mucous membranes are moist.      Pharynx: Oropharynx is clear.   Eyes:      Extraocular Movements: Extraocular movements intact.       Pupils: Pupils are equal, round, and reactive to light.   Cardiovascular:      Rate and Rhythm: Normal rate. Rhythm irregular.   Pulmonary:      Breath sounds: Wheezing and rhonchi present.   Abdominal:      General: Bowel sounds are normal.      Palpations: Abdomen is soft.   Musculoskeletal:      Right lower leg: Edema present.      Left lower leg: Edema present.   Skin:     General: Skin is warm and dry.   Neurological:      Mental Status: He is alert and oriented to person, place, and time. Mental status is at baseline.   Psychiatric:         Mood and Affect: Affect is flat.         Wounds       Wound 05/19/25 0233 Moisture associated dermatitis Left dorsal Foot (Active)   05/19/25 0233 Foot   Present on Original Admission: Y   Primary Wound Type: Moisture ass   Side: Left   Orientation: dorsal        Wound 05/19/25 0231 Moisture associated dermatitis Left lateral Foot (Active)   05/19/25 0231 Foot   Present on Original Admission: Y   Primary Wound Type: Moisture ass   Side: Left   Orientation: lateral        Wound 05/19/25 0231 Pressure Injury Right anterior Ankle (Active)   05/19/25 0231 Ankle   Present on Original Admission: Y   Primary Wound Type: Pressure inj   Side: Right   Orientation: anterior        Wound 05/19/25 0233 Moisture associated dermatitis Left anterior Foot (Active)   05/19/25 0233 Foot   Present on Original Admission:    Primary Wound Type: Moisture ass   Side: Left   Orientation: anterior        Wound 06/04/25 1030 Moisture associated dermatitis Right lateral Abdomen (Active)   06/04/25 1030 Abdomen   Present on Original Admission: Y   Primary Wound Type: Moisture ass   Side: Right   Orientation: lateral        Wound 06/04/25 1030 Moisture associated dermatitis Left lateral Abdomen (Active)   06/04/25 1030 Abdomen   Present on Original Admission: Y   Primary Wound Type: Moisture ass   Side: Left   Orientation: lateral        Wound 06/04/25 1030 Moisture associated dermatitis Right Groin  "(Active)   06/04/25 1030 Groin   Present on Original Admission: Y   Primary Wound Type: Moisture ass   Side: Right        Wound 06/04/25 1030 Moisture associated dermatitis Left Groin (Active)   06/04/25 1030 Groin   Present on Original Admission: Y   Primary Wound Type: Moisture ass   Side: Left        Wound 06/04/25 1030 Moisture associated dermatitis Sacral spine (Active)   06/04/25 1030 Sacral spine   Present on Original Admission: Y   Primary Wound Type: Moisture ass         Wounds consistently followed by [facility] NP Sheree Tao     Labs:  Recent Labs   Lab 06/21/25  0448 06/22/25  0759 06/23/25  0351   WBC 10.60 11.22 11.91   HGB 8.9* 10.3* 10.2*   HCT 27.3* 33.0* 33.3*    235 206     Recent Labs   Lab 06/21/25  0448 06/22/25  0759 06/23/25  0520    145 143   K 4.1 4.5 4.6    106 107   CO2 27 27 27   BUN 41* 35* 35*   CREATININE 1.2 1.0 0.9   * 90 159*   CALCIUM 8.9 9.7 9.2   MG 2.2 2.1 2.1   PHOS 2.1* 2.3* 2.4*     Recent Labs   Lab 06/21/25 0448 06/22/25  0759 06/23/25  0520   ALKPHOS 100 112 120   ALT 84* 95* 89*   AST 53* 48* 42   ALBUMIN 1.9* 2.3* 2.1*   PROT 5.1* 5.6* 5.3*   BILITOT 0.4 0.5 0.6       No results for input(s): "POCTGLUCOSE" in the last 72 hours.      Meds Scheduled:   amiodarone  400 mg Oral BID    Followed by    [START ON 7/1/2025] amiodarone  200 mg Oral Daily    amitriptyline  25 mg Oral QHS    ammonium lactate   Topical (Top) Daily    apixaban  5 mg Oral BID    aspirin  81 mg Oral QAM    atorvastatin  80 mg Oral Daily    fluticasone propionate  2 spray Each Nostril Daily    guaiFENesin  1,200 mg Oral BID    levalbuterol  1.2495 mg Nebulization Q6H    levETIRAcetam  500 mg Oral BID    levoFLOXacin  750 mg Oral Daily    mirtazapine  15 mg Oral QHS    pantoprazole  40 mg Oral Daily    roflumilast  500 mcg Oral Daily    sodium chloride 3%  4 mL Nebulization BID    tiotropium bromide  2 puff Inhalation Daily       Current Inpatient Problem List:  Active " Hospital Problems    Diagnosis  POA    *Acute on chronic respiratory failure with hypoxia [J96.21]  Yes    Parainfluenza virus infection [B34.8]  Yes    Pneumonia of right lung due to infectious organism [J18.9]  Yes    Cellulitis [L03.90]  Yes    TIA (transient ischemic attack) [G45.9]  Yes     ASA and statin      Metastasis to brain [C79.31]  Yes    Adenocarcinoma, lung, 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       CAD (coronary artery disease) [I25.10]  Yes     Chronic     -LHC (10/31/13) for stemi: pLAD 100%, Cx with Li's, mRCA 40%, LVEF 55%,. LVEDP 15   -Xience 3.0 x 33 mm to pLAD, post dilated with 3.5 NC and 4.0 NC.   -TTE (8/14/13): LVEF 55%, grade I diastolic dysfunction      HTN (hypertension), benign [I10]  Yes    SHOLA (obstructive sleep apnea) [G47.33]  Yes     CPAP at night      GERD (gastroesophageal reflux disease) [K21.9]  Yes     Continue PPI        Resolved Hospital Problems   No resolved problems to display.         Assessment / Plan:     Acute respiratory failure with hypoxia  COPD with acute exacerbation  Parainfluenza virus infection  Pneumonia   On Symbicort Spiriva and Daliresp at home. Follows up with pulmonology outpatient.   Completed 7 day course of Augmentin and doxycycline on 06/01  Continue Daliresp 500mcg daily  Continue Xopenex p.r.n.   Continue weaning high-flow nasal cannula  Continue guaifenesin 1200 mg b.i.d.  O2 requirements are stable, currently on 5 L nasal cannula.  Continue levofloxacin, completes course on 06/26      Chest pain, new   Resolved   Evaluated by cardiology while in the hospital  Will need outpatient follow up with Dr. Ba with Cardiology     Cellulitis   Wound followed and managed by consistent, contracted [facility] NP  Completed 7 day course of doxy and Augmentin  Will need follow up with Podiatry  after discharge     Restless leg syndrome   Continue amitriptyline 25 mg nightly     Adenocarcinoma, lung, left   Metastasis to brain  Completed treatment with chemo/XRT. Brain XRT for lesionsx2. off immunotherapy since 12/24  suspect the LLL area was consolidation of prior RXT changes and not malignancy and is nearly resolved.  CT on presentation with new SANJAY nodule, need op follow up with pulm  Continue prophylactic Keppra 500 mg b.i.d.    Right shoulder pain  Having some shoulder pain to his right shoulder, which seems to be some muscle spasms.    Started Robaxin 500 mg q.i.d. p.r.n. on 6/6    Compression fracture of L1 vertebrae with routine healing   TLSO brace when out of bed  F/u with Dr. Hopkins     Dyslipidemia  TIA  CAD   Continue aspirin   Continue atorvastatin 80 mg daily     Tachycardia   Episodes of SVT while at the hospital   Evaluated by Cardiology   Likely worsened by underlying hypoxia   Supplemental O2   Uncontrolled AFib with RVR on 06/16, he was placed on diltiazem drip though rate remained difficult to control  Sent back [place] [facility] and ultimately started on amiodarone for rate control   Continue amiodarone 400 mg b.i.d. for 8 days, then transition to amiodarone 200 mg daily       SHOLA   Continue CPAP nightly     GERD  Continue PPI daily     Left lateral foot wound   Right anterior ankle wound   Traumatic left dorsal foot wound   Left anterior foot venous ulcer   Skin tear right distal arm   Pressure injury nose  Wound followed and managed by consistent, contracted [facility] NP    Advance Care Planning, 06/05/2025  This was a voluntary visit and the option to decline counseling was given  Length of discussion:  16 minutes  Life limiting problem:  Respiratory failure, adenocarcinoma  Topics discussed:  Code status  Outcome of discussion:  Patient would like to remain a full code.  In the event that he is not able to make his own decisions, he would like to defer decision making to his  daughter  Decision Maker:Jordin Allen     Psychiatric Assessment  Patient Health Questionnaire-9 (PHQ-9)    Date:  06/05/2025  Total Score: 15  Screening is Positive   Diagnosis and Treatment Plan:  Moderate MDD   Continue amitriptyline 25 mg nightly   Increasing mirtazapine to 15 mg nightly, which will hopefully help with his appetite as well       Diet:  Cardiac   GI Ppx:  PPI   DVT Ppx:  Enoxaparin     Lines:  PIV   Drains:  None   Airways:n/a   Wounds:  Multiple    Goals of Care:   Restorative,  Treat infection  Optimize nutrition  Wound healing  Muscle strengthening  Restoration of ADL's  Improve mobility    Anticipated Disposition:    Anticipated d/c 6/24 IPR vs home    Follow up appointments:   Future Appointments   Date Time Provider Department Center   6/24/2025  7:15 AM [facility] OIC-MRI2 [facility] MRI [facility]   6/24/2025  9:30 AM Steven Campos MD [facility] NEUROSC [place]   6/24/2025  2:00 PM Bienvenido Henson MD [facility] RADONC3 [place]   7/1/2025 10:30 AM [facility] OIC-XRAY [facility] XRAY [facility]   7/1/2025 11:30 AM Bolivar Sr PA [facility] NEUROS8 [facility]   7/8/2025  9:15 AM MEEKS, VISUAL FIELDS [facility] OPHTHAL [facility]   7/8/2025 10:00 AM Cara Looney MD [facility] OPHTHAL [facility]   7/15/2025 10:00 AM Garrett Lloyd DPM [facility] POD [facility]   7/23/2025 10:15 AM CV [facility] ECHO [facility] [place]   8/15/2025  9:20 AM Bienvenido Ward MD [facility] PULMON [place]   8/27/2025  9:00 AM Guido Trejo MD [facility] ENT [facility]   9/11/2025  8:30 AM Yan Palmer MD [facility] DERM [facility]         Ernestine Palomino NP  Hospital Medicine [facility]    I have spent 81 minutes reviewing patient records, examining, and  of the patient/ patient's family with greater than 50% of the time spent with direct patient care and coordination.

## 2025-06-23 NOTE — PLAN OF CARE
Problem: Physical Therapy  Goal: Physical Therapy Goal  Description: Goals to be met by: DC     Patient will increase functional independence with mobility by performin. Supine to sit with CGA  2. Sit to supine with CGA  3. Sit to stand transfer with min a with RW  4. Gait  x 120 feet with min a using Rolling Walker.   5. Improve OOB time to 2 hours in bedside chair    2025 1014 by Kei Palomino, PT  Outcome: Progressing

## 2025-06-23 NOTE — PLAN OF CARE
VSS overnight. Pt frustrated at start of shift 2/2 productive cough keeping him from resting last few days. Tessalon pearls/ hydroxyzine given x1. Pt slept well after admin. Tolerated BiPAP overnight. Remains in afib on monitor. BUE edema noted with weeping from left upper arm. Pt able to make needs known. Turned q2h. No BM this shift. Safety maintained.       Problem: Adult Inpatient Plan of Care  Goal: Plan of Care Review  Outcome: Progressing  Goal: Patient-Specific Goal (Individualized)  Outcome: Progressing  Goal: Absence of Hospital-Acquired Illness or Injury  Outcome: Progressing  Goal: Optimal Comfort and Wellbeing  Outcome: Progressing  Goal: Readiness for Transition of Care  Outcome: Progressing     Problem: Pneumonia  Goal: Fluid Balance  Outcome: Progressing  Goal: Resolution of Infection Signs and Symptoms  Outcome: Progressing  Goal: Effective Oxygenation and Ventilation  Outcome: Progressing     Problem: Wound  Goal: Optimal Coping  Outcome: Progressing  Goal: Optimal Functional Ability  Outcome: Progressing  Goal: Absence of Infection Signs and Symptoms  Outcome: Progressing  Goal: Improved Oral Intake  Outcome: Progressing  Goal: Optimal Pain Control and Function  Outcome: Progressing  Goal: Skin Health and Integrity  Outcome: Progressing  Goal: Optimal Wound Healing  Outcome: Progressing     Problem: Skin Injury Risk Increased  Goal: Skin Health and Integrity  Outcome: Progressing     Problem: Fall Injury Risk  Goal: Absence of Fall and Fall-Related Injury  Outcome: Progressing     Problem: Breathing Pattern Ineffective  Goal: Effective Breathing Pattern  Outcome: Progressing     Problem: Airway Clearance Ineffective  Goal: Effective Airway Clearance  Outcome: Progressing     Problem: Gas Exchange Impaired  Goal: Optimal Gas Exchange  Outcome: Progressing     Problem: Noninvasive Ventilation Acute  Goal: Effective Unassisted Ventilation and Oxygenation  Outcome: Progressing     Problem:  Infection  Goal: Absence of Infection Signs and Symptoms  Outcome: Progressing

## 2025-06-23 NOTE — PROGRESS NOTES
This patient is on Levaquin 750mg PO q24hrs (readmitted on)  TX: LRI  CX: NGTD  Duration of Therapy: TBD  Stop Date: 6/26

## 2025-06-23 NOTE — PT/OT/SLP RE-EVAL
Physical Therapy Re-evaluation    Patient Name:  Jordin Allen Jr.   MRN:  [id number]    Recommendations:     Discharge Recommendations: Moderate Intensity Therapy  Discharge Equipment Recommendations: wheelchair   Barriers to discharge: Inaccessible home    Assessment:     Jordin Allen Jr. is a 79 y.o. male admitted with a medical diagnosis of Acute on chronic respiratory failure with hypoxia.  He presents with the following impairments/functional limitations: weakness, impaired endurance, impaired functional mobility, gait instability, orthopedic precautions, decreased lower extremity function, decreased safety awareness Tolerated tx well, patient motivated to participate in therapy, goals updated.    Rehab Prognosis:  fair; patient would benefit from acute skilled PT services to address these deficits and reach maximum level of function.      Recent Surgery: * No surgery found *      Plan:     During this hospitalization, patient to be seen 5 x/week to address the above listed problems via gait training, therapeutic activities, therapeutic exercises, neuromuscular re-education, wheelchair management/training  Plan of Care Expires:     Plan of Care Reviewed with: patient    Subjective     Communicated with nurse prior to session.  Patient found HOB elevated with oxygen, telemetry, peripheral IV upon PT entry to room, agreeable to evaluation.      Chief Complaint: no co pain  Patient comments/goals: to get OOB more and walk  Pain/Comfort:  Pain Rating 1: 0/10    Patients cultural, spiritual, Spiritism conflicts given the current situation:        Objective:     Patient found with: oxygen, telemetry, peripheral IV     General Precautions: Standard, fall  Orthopedic Precautions: spinal precautions  Braces: TLSO (when OOB)  Respiratory Status: Nasal cannula, flow 6 L/min    Exams:  Cognitive Exam:  Patient is oriented to Person, Place, and Time  Gross Motor Coordination:  WFL    Functional Mobility:  Bed  Mobility:     Rolling Left:  supervision  Rolling Right: supervision  Supine to Sit: moderate assistance  Sit to Supine: moderate assistance  Transfers:     Sit to Stand:  dependence with rolling walker    AM-PAC 6 CLICK MOBILITY  Total Score:9       Treatment and Education:  Patient performed supine AAROM x 20 reps each to B LE hip flexion, ABD/ADD, knee flexion/ extension, ankle DF/PF.     Pt educated on goal setting  Pt educated on benefits of OOB activity      Time provided for education, counseling and discussion of health disposition in regards to patient's current status  All questions answered within PT scope of practice and to patient's satisfaction  PT role in POC to address current functional deficits      Patient left HOB elevated with all lines intact and call button in reach.    GOALS:   Multidisciplinary Problems       Physical Therapy Goals          Problem: Physical Therapy    Goal Priority Disciplines Outcome Interventions   Physical Therapy Goal     PT, PT/OT Progressing    Description: Goals to be met by: DC     Patient will increase functional independence with mobility by performin. Supine to sit with [place]  2. Sit to supine with [place]  3. Sit to stand transfer with Supervision with RW  4. Gait  x 150 feet with Contact Guard Assistance using Rolling Walker.                          DME Justifications:  Jordin Allen Jr. has a mobility limitation that significantly impairs his ability to participate in one or more mobility related activities of daily living (MRADLs) such as toileting, feeding, dressing, grooming, and bathing in customary locations in the home.  The mobility limitation cannot be sufficiently resolved by the use of a cane or walker.   The use of a manual wheelchair will significantly improve the patients ability to participate in MRADLS and the patient will use it on regular basis in the home.  Jordin Allen JrGreg has expressed his willingness to use a  manual wheelchair in the home. Patients upper body strength is sufficient for propulsion.  He also has a caregiver who is available, willing, and able to provide assistance with the wheelchair when needed.      History:     Past Medical History:   Diagnosis Date    Benign neoplasm of colon 10/01/2013    Bronchitis chronic     CAD (coronary artery disease) 10/31/2013    Cataract 2008    COPD (chronic obstructive pulmonary disease)     Emphysema of lung     Encounter for preventive health examination 03/21/2018    GERD (gastroesophageal reflux disease)     Glaucoma 2010    Hearing loss 2015    Heart attack 2013    Hx of colonic polyps     Hypertension     NAFLD (nonalcoholic fatty liver disease) 03/27/2017    SHOLA on CPAP     RLS (restless legs syndrome)     Screen for colon cancer 08/30/2013       Past Surgical History:   Procedure Laterality Date    bilateral foot surgery  1993    CARDIAC CATHETERIZATION  2013    x1    CATARACT EXTRACTION, BILATERAL      COLON SURGERY  2013    Ace Mott MD    COLONOSCOPY N/A 03/21/2018    Procedure: COLONOSCOPY;  Surgeon: Terry Mott MD;  [facility]  Service: Endoscopy;  Laterality: N/A;    COLONOSCOPY N/A 04/12/2019    Procedure: COLONOSCOPY;  Surgeon: John Mantilla MD;  [facility]  Service: Endoscopy;  Laterality: N/A;    COLONOSCOPY N/A 05/31/2022    Procedure: COLONOSCOPY;  Surgeon: MEDINA Farris MD;  [facility]  Service: Endoscopy;  Laterality: N/A;  fully vacc-inst portal-tb    ENDOBRONCHIAL ULTRASOUND Bilateral 04/30/2024    Procedure: ENDOBRONCHIAL ULTRASOUND (EBUS);  Surgeon: Naveed Aguero MD;  Location: [facility];  Service: Pulmonary;  Laterality: Bilateral;    EYE SURGERY  2011    scrotal abscess removal      TYMPANOSTOMY TUBE PLACEMENT  2017       Time Tracking:     PT Received On: 06/23/25  PT Start Time: 0945     PT Stop Time: 1005  PT Total Time (min): 20 min     Billable Minutes: Re-eval 10  and Therapeutic Exercise 10      06/23/2025

## 2025-06-23 NOTE — PROGRESS NOTES
06/23/25 1300        Wound 06/09/25 1313 Other (comment) Nose   Date First Assessed/Time First Assessed: 06/09/25 1313   Present on Original Admission: Yes  Primary Wound Type: (c) Other (comment)  Location: Nose   Wound Image    Dressing Appearance Open to air   Drainage Amount None   Drainage Characteristics/Odor No odor   Appearance Pink   Red (%), Wound Tissue Color 100 %   Periwound Area Dry   Wound Length (cm) 0 cm   Wound Width (cm) 0 cm   Wound Depth (cm) 0 cm   Wound Volume (cm^3) 0 cm^3   Wound Surface Area (cm^2) 0 cm^2   Dressing   (open to air)        Wound 06/18/25 1502 Rash Perineum   Date First Assessed/Time First Assessed: 06/18/25 1502   Present on Original Admission: Yes  Primary Wound Type: Rash  Location: Perineum   Wound Image     Distribution   (open to air, no rash)

## 2025-06-23 NOTE — PT/OT/SLP RE-EVAL
PT/PTA met face to face to discuss patient's treatment plan and progress towards established goals.  Treatment will be continued as described in initial report/eval and progress notes.  Patient will be seen by physical therapist every sixth visit and minimally once per month.    Additional information: Patient was re evaluated today. Goals remain appropriate

## 2025-06-23 NOTE — PLAN OF CARE
Problem: Occupational Therapy  Goal: Occupational Therapy Goal  Description: Goals to be met by: 7/23/25  Patient will increase functional independence with ADLs by performing:    UE Dressing with Set-up Assistance.  LE Dressing with SBA using appropriate AE as needed.  Grooming while seated at sink with Set-up Assistance.  Toileting from 3-in-1 commode over toilet with min A for hygiene and clothing management.   Supine to sit with SPV   Step transfer with min A with RW.  Toilet transfer to 3-in-1 commode over toilet with min A with RW.    Outcome: Progressing    Pt has been evaluated by Ot; appropriate POC established.

## 2025-06-23 NOTE — PLAN OF CARE
Problem: Breathing Pattern Ineffective  Goal: Effective Breathing Pattern  Intervention: Promote Improved Breathing Pattern  Flowsheets (Taken 6/23/2025 115)  Airway/Ventilation Management:   airway patency maintained   humidification applied   pulmonary hygiene promoted   calming measures promoted  Breathing Techniques/Airway Clearance: deep/controlled cough encouraged  Head of Bed (HOB) Positioning: HOB elevated     Problem: Airway Clearance Ineffective  Goal: Effective Airway Clearance  Intervention: Promote Airway Secretion Clearance  Flowsheets (Taken 6/23/2025 1151)  Breathing Techniques/Airway Clearance: deep/controlled cough encouraged  Cough And Deep Breathing: done independently per patient

## 2025-06-23 NOTE — PT/OT/SLP EVAL
"Occupational Therapy   Evaluation    Name: Jordin Allen Jr.  MRN: [id number]  Admitting Diagnosis: Acute on chronic respiratory failure with hypoxia  Recent Surgery: * No surgery found *      Recommendations:     Discharge Recommendations: Moderate Intensity Therapy  Discharge Equipment Recommendations:  wheelchair, hip kit, 3-in-1 commode  Barriers to discharge:  Decreased caregiver support, Other (Comment) (increased A with ADLs and func mobility tasks)    Assessment:     Jordin Allen Jr. is a 79 y.o. male with a medical diagnosis of Acute on chronic respiratory failure with hypoxia.  He presents with weakness, impaired endurance, impaired self care skills, impaired functional mobility, gait instability, impaired balance, decreased coordination, decreased upper extremity function, decreased lower extremity function, decreased safety awareness, pain, decreased ROM, impaired coordination, impaired cardiopulmonary response to activity, orthopedic precautions.  Pt with fair tolerance to session on this date, fatiguing quickly with EOB sitting on this date. Pt pleasant and motivated to participate in therapy services. Pt would benefit from moderate intensity skilled OT services to improve ADL performance and maximize functional capacity.     Rehab Prognosis: Fair; patient would benefit from acute skilled OT services to address these deficits and reach maximum level of function.       Plan:     Patient to be seen 5 x/week to address the above listed problems via self-care/home management, therapeutic activities, therapeutic exercises, neuromuscular re-education  Plan of Care Expires: 07/23/25  Plan of Care Reviewed with: patient    Subjective     Chief Complaint: fatigue with sitting EOB  Patient/Family Comments/goals: "get stronger"       Occupational Profile:  Living Environment: Pt lives with his daughter on the 1st floor of a 2  with 1 MICHAEL.  He has a walk-in shower with a shower chair on the 1st floor.  Pt " reports no recent falls.   Previous level of function: Independent - Mod I with most ADLs but required (A) with bathing.  Mod I to ambulate with a SPC.  Pt said his functional decline began in February 2025 when he had a TIA.   Roles and Routines: father, father-in-law; retired  Equipment Used at Home: cane, straight, shower chair, walker, rolling (Pt owns but doesn't use RW (is new).)  No home oxygen.    Assistance upon Discharge: His daughter works.  His son-in-law works from home on the 2nd floor.         Pain/Comfort:  Pain Rating 1: 0/10    Patients cultural, spiritual, Anglican conflicts given the current situation: no    Objective:     Communicated with: NSG prior to session.  Patient found HOB elevated with telemetry, pulse ox (continuous), peripheral IV, oxygen (4 L) upon OT entry to room.    General Precautions: Standard, fall  Orthopedic Precautions: spinal precautions  Braces: TLSO (when OOB)  Respiratory Status: Nasal cannula, flow 4 L/min    Occupational Performance:    Bed Mobility:    Patient completed Rolling/Turning to Right with stand by assistance  Patient completed Scooting/Bridging with minimum assistance w/ bed trended   Patient completed Supine to Sit with minimum assistance  Patient completed Sit to Supine with minimum assistance  EOB sitting-15 minutes SBA      Activities of Daily Living:  Grooming: stand by assistance sitting EOB  Lower Body Dressing: maximal assistance to don and doff socks    Cognitive/Visual Perceptual:  Cognitive/Psychosocial Skills:     -       Oriented to: Person, Place, Time, and Situation   -       Follows Commands/attention:Follows multistep  commands  -       Communication: clear/fluent  -       Memory: No Deficits noted  -       Safety awareness/insight to disability: impaired   -       Mood/Affect/Coping skills/emotional control: Appropriate to situation  Visual/Perceptual:      -Intact      Physical Exam:  Balance:    -       SBA-sitting,  standing-NT  Postural examination/scapula alignment:    -       Rounded shoulders  -       Forward head  Sensation:    -       Intact  Motor Planning:    -       WFL for ADLs and bed mobility  Dominant hand:    -       RUE  Upper Extremity Range of Motion:     -       Right Upper Extremity: WFL  -       Left Upper Extremity: WFL  Upper Extremity Strength:    -       Right Upper Extremity: grossly 4/5  -       Left Upper Extremity: grossly 4/5   Strength:    -       Right Upper Extremity: WFL  -       Left Upper Extremity: WFL  Fine Motor Coordination:    -       Intact  Left hand, finger to nose, Right hand, finger to nose, Left hand thumb/finger opposition skills, Right hand thumb/finger opposition skills, Left hand, manipulation of objects, and Right hand, manipulation of objects  Gross motor coordination:   WFL    AMPAC 6 Click ADL:  AMPAC Total Score: 14    Treatment & Education:  -Role of OT  -OT Poc  -safety awareness and precautions  -Level of A required for ADLs and functional mobility  -AE used for ADL completion  -importance of OOB activity and energy conservation   -transfer training   -spinal precautions and log rolling- pt able to verbalize 3/3 and demo good log rolling technique     Patient left HOB elevated with all lines intact, call button in reach, and bed alarm on    GOALS:   Multidisciplinary Problems       Occupational Therapy Goals          Problem: Occupational Therapy    Goal Priority Disciplines Outcome Interventions   Occupational Therapy Goal     OT, PT/OT Progressing    Description: Goals to be met by: 7//10/2025     Patient will increase functional independence with ADLs by performing:    UE Dressing with Set-up Assistance.  LE Dressing with SBA using appropriate AE as needed.  Grooming while seated at sink with Set-up Assistance.  Toileting from 3-in-1 commode over toilet with Stand-by Assistance for hygiene and clothing management.   Supine to sit with Mod I.   Step transfer with  Stand-by Assistance with RW.  Toilet transfer to 3-in-1 commode over toilet with Stand-by Assistance with RW.                           History:     Past Medical History:   Diagnosis Date    Benign neoplasm of colon 10/01/2013    Bronchitis chronic     CAD (coronary artery disease) 10/31/2013    Cataract 2008    COPD (chronic obstructive pulmonary disease)     Emphysema of lung     Encounter for preventive health examination 03/21/2018    GERD (gastroesophageal reflux disease)     Glaucoma 2010    Hearing loss 2015    Heart attack 2013    Hx of colonic polyps     Hypertension     NAFLD (nonalcoholic fatty liver disease) 03/27/2017    SHOLA on CPAP     RLS (restless legs syndrome)     Screen for colon cancer 08/30/2013         Past Surgical History:   Procedure Laterality Date    bilateral foot surgery  1993    CARDIAC CATHETERIZATION  2013    x1    CATARACT EXTRACTION, BILATERAL      COLON SURGERY  2013    Aec Mott MD    COLONOSCOPY N/A 03/21/2018    Procedure: COLONOSCOPY;  Surgeon: Terry Mott MD;  Location: [facility] ([address] [place] [address]);  Service: Endoscopy;  Laterality: N/A;    COLONOSCOPY N/A 04/12/2019    Procedure: COLONOSCOPY;  Surgeon: John Mantilla MD;  [facility] ([facility]  Service: Endoscopy;  Laterality: N/A;    COLONOSCOPY N/A 05/31/2022    Procedure: COLONOSCOPY;  Surgeon: MEDINA Farris MD;  [facility]  Service: Endoscopy;  Laterality: N/A;  fully vacc-inst portal-tb    ENDOBRONCHIAL ULTRASOUND Bilateral 04/30/2024    Procedure: ENDOBRONCHIAL ULTRASOUND (EBUS);  Surgeon: Naveed Aguero MD;  Location: [facility] OR;  Service: Pulmonary;  Laterality: Bilateral;    EYE SURGERY  2011    scrotal abscess removal      TYMPANOSTOMY TUBE PLACEMENT  2017       Time Tracking:     OT Date of Treatment: 06/23/25  OT Start Time: 1516  OT Stop Time: 1532  OT Total Time (min): 16 min    Billable Minutes:Evaluation 8 minutes  Self Care/Home Management 8 minutes    6/23/2025

## 2025-06-24 PROCEDURE — 94640 AIRWAY INHALATION TREATMENT: CPT

## 2025-06-24 PROCEDURE — 94761 N-INVAS EAR/PLS OXIMETRY MLT: CPT

## 2025-06-24 PROCEDURE — 25000242 PHARM REV CODE 250 ALT 637 W/ HCPCS: Performed by: FAMILY MEDICINE

## 2025-06-24 PROCEDURE — 25000003 PHARM REV CODE 250: Performed by: HOSPITALIST

## 2025-06-24 PROCEDURE — 27000221 HC OXYGEN, UP TO 24 HOURS

## 2025-06-24 PROCEDURE — 25000242 PHARM REV CODE 250 ALT 637 W/ HCPCS

## 2025-06-24 PROCEDURE — 97112 NEUROMUSCULAR REEDUCATION: CPT | Mod: CQ

## 2025-06-24 PROCEDURE — 99900035 HC TECH TIME PER 15 MIN (STAT)

## 2025-06-24 PROCEDURE — 97110 THERAPEUTIC EXERCISES: CPT

## 2025-06-24 PROCEDURE — 94664 DEMO&/EVAL PT USE INHALER: CPT

## 2025-06-24 PROCEDURE — 97530 THERAPEUTIC ACTIVITIES: CPT | Mod: CQ

## 2025-06-24 PROCEDURE — 11000001 HC ACUTE MED/SURG PRIVATE ROOM

## 2025-06-24 PROCEDURE — 25000003 PHARM REV CODE 250: Performed by: STUDENT IN AN ORGANIZED HEALTH CARE EDUCATION/TRAINING PROGRAM

## 2025-06-24 PROCEDURE — 97110 THERAPEUTIC EXERCISES: CPT | Mod: CQ

## 2025-06-24 PROCEDURE — 94660 CPAP INITIATION&MGMT: CPT

## 2025-06-24 PROCEDURE — 27000173 HC ACAPELLA DEVICE DH OR DM

## 2025-06-24 PROCEDURE — 25000003 PHARM REV CODE 250

## 2025-06-24 PROCEDURE — 99900031 HC PATIENT EDUCATION (STAT)

## 2025-06-24 PROCEDURE — 25000003 PHARM REV CODE 250: Performed by: NURSE PRACTITIONER

## 2025-06-24 RX ADMIN — PANTOPRAZOLE SODIUM 40 MG: 40 TABLET, DELAYED RELEASE ORAL at 08:06

## 2025-06-24 RX ADMIN — Medication 4 ML: at 07:06

## 2025-06-24 RX ADMIN — APIXABAN 5 MG: 5 TABLET, FILM COATED ORAL at 08:06

## 2025-06-24 RX ADMIN — LEVOFLOXACIN 750 MG: 750 TABLET, FILM COATED ORAL at 11:06

## 2025-06-24 RX ADMIN — LEVALBUTEROL HYDROCHLORIDE 1.25 MG: 0.63 SOLUTION RESPIRATORY (INHALATION) at 11:06

## 2025-06-24 RX ADMIN — BENZONATATE 100 MG: 100 CAPSULE ORAL at 08:06

## 2025-06-24 RX ADMIN — LEVALBUTEROL HYDROCHLORIDE 1.25 MG: 0.63 SOLUTION RESPIRATORY (INHALATION) at 07:06

## 2025-06-24 RX ADMIN — ATORVASTATIN CALCIUM 80 MG: 80 TABLET, FILM COATED ORAL at 08:06

## 2025-06-24 RX ADMIN — ASPIRIN 81 MG: 81 TABLET, COATED ORAL at 08:06

## 2025-06-24 RX ADMIN — ROFLUMILAST 500 MCG: 500 TABLET ORAL at 08:06

## 2025-06-24 RX ADMIN — LEVETIRACETAM 500 MG: 500 TABLET, FILM COATED ORAL at 08:06

## 2025-06-24 RX ADMIN — Medication: at 08:06

## 2025-06-24 RX ADMIN — MIRTAZAPINE 15 MG: 15 TABLET, FILM COATED ORAL at 08:06

## 2025-06-24 RX ADMIN — LEVALBUTEROL HYDROCHLORIDE 1.25 MG: 0.63 SOLUTION RESPIRATORY (INHALATION) at 01:06

## 2025-06-24 RX ADMIN — HYDROXYZINE HYDROCHLORIDE 25 MG: 25 TABLET, FILM COATED ORAL at 06:06

## 2025-06-24 RX ADMIN — MELATONIN TAB 3 MG 6 MG: 3 TAB at 08:06

## 2025-06-24 RX ADMIN — LEVALBUTEROL HYDROCHLORIDE 1.25 MG: 0.63 SOLUTION RESPIRATORY (INHALATION) at 12:06

## 2025-06-24 RX ADMIN — GUAIFENESIN 1200 MG: 600 TABLET, MULTILAYER, EXTENDED RELEASE ORAL at 08:06

## 2025-06-24 RX ADMIN — AMIODARONE HYDROCHLORIDE 400 MG: 100 TABLET ORAL at 08:06

## 2025-06-24 RX ADMIN — LEVALBUTEROL HYDROCHLORIDE 1.25 MG: 0.63 SOLUTION RESPIRATORY (INHALATION) at 06:06

## 2025-06-24 RX ADMIN — OXYCODONE HYDROCHLORIDE AND ACETAMINOPHEN 1 TABLET: 5; 325 TABLET ORAL at 06:06

## 2025-06-24 RX ADMIN — AMITRIPTYLINE HYDROCHLORIDE 25 MG: 25 TABLET, FILM COATED ORAL at 08:06

## 2025-06-24 RX ADMIN — FLUTICASONE PROPIONATE 100 MCG: 50 SPRAY, METERED NASAL at 08:06

## 2025-06-24 RX ADMIN — ONDANSETRON 4 MG: 4 TABLET, ORALLY DISINTEGRATING ORAL at 08:06

## 2025-06-24 RX ADMIN — METHOCARBAMOL 500 MG: 500 TABLET ORAL at 08:06

## 2025-06-24 RX ADMIN — TIOTROPIUM BROMIDE INHALATION SPRAY 2 PUFF: 3.12 SPRAY, METERED RESPIRATORY (INHALATION) at 10:06

## 2025-06-24 RX ADMIN — Medication 4 ML: at 06:06

## 2025-06-24 NOTE — PLAN OF CARE
Problem: Pneumonia  Goal: Effective Oxygenation and Ventilation  Intervention: Optimize Oxygenation and Ventilation  Flowsheets (Taken 6/24/2025 1107)  Airway/Ventilation Management: pulmonary hygiene promoted     Problem: Breathing Pattern Ineffective  Goal: Effective Breathing Pattern  Intervention: Promote Improved Breathing Pattern  Flowsheets (Taken 6/24/2025 1106)  Airway/Ventilation Management:   airway patency maintained   humidification applied  Breathing Techniques/Airway Clearance: deep/controlled cough encouraged  Head of Bed (HOB) Positioning: HOB elevated

## 2025-06-24 NOTE — PT/OT/SLP PROGRESS
Physical Therapy Treatment    Patient Name:  Jordin Allen Jr.   MRN:  [id number]    Recommendations:     Discharge Recommendations: Moderate Intensity Therapy  Discharge Equipment Recommendations: wheelchair, hip kit, 3-in-1 commode  Barriers to discharge: Decreased caregiver support    Assessment:     Jordin Allen Jr. is a 79 y.o. male admitted with a medical diagnosis of Acute on chronic respiratory failure with hypoxia.  He presents with the following impairments/functional limitations: weakness, impaired balance, decreased safety awareness, impaired endurance, impaired self care skills, decreased coordination, decreased ROM, impaired functional mobility, decreased upper extremity function, decreased lower extremity function, impaired cardiopulmonary response to activity .    Rehab Prognosis: Good; patient would benefit from acute skilled PT services to address these deficits and reach maximum level of function.    Recent Surgery: * No surgery found *      Plan:     During this hospitalization, patient to be seen 5 x/week to address the identified rehab impairments via therapeutic activities, therapeutic exercises, neuromuscular re-education and progress toward the following goals:    Plan of Care Expires:       Subjective     Chief Complaint: Pt c/o SOB with activity.  Patient/Family Comments/goals: Pt wants to get stronger and return home.  Pain/Comfort:  Pain Rating 1: 0/10      Objective:     Communicated with PT prior to session.  Patient found HOB elevated with telemetry, peripheral IV, pulse ox (continuous), oxygen upon PT entry to room.     General Precautions: Standard, fall  Orthopedic Precautions: spinal precautions  Braces: TLSO  Respiratory Status: Nasal cannula, flow 5 L/min     Functional Mobility:  Bed Mobility:  Rolling Left:  minimum assistance and moderate assistance  Rolling Right: minimum assistance and moderate assistance  Scooting: maximal assistance  Supine to Sit: moderate  assistance  Sit to Supine: moderate assistance  Balance: sitting  Good -        AM-PAC 6 CLICK MOBILITY  Turning over in bed (including adjusting bedclothes, sheets and blankets)?: 3  Sitting down on and standing up from a chair with arms (e.g., wheelchair, bedside commode, etc.): 1  Moving from lying on back to sitting on the side of the bed?: 2  Moving to and from a bed to a chair (including a wheelchair)?: 1  Need to walk in hospital room?: 1  Climbing 3-5 steps with a railing?: 1  Basic Mobility Total Score: 9       Treatment & Education:  Pt performed bed mobility as noted above and ty sitting at EOB with close supervision ~ 22 minutes and performed (B) LE TE AROM and gentle stretching all available planes x 20 reps x 2 sets ea.Pt performed x 10 minutes of sitting balance exercises then returned to bed in supine with head elevated and call button in reach.      Patient left HOB elevated with all lines intact, call button in reach, and bed alarm on..    GOALS:   Multidisciplinary Problems       Physical Therapy Goals          Problem: Physical Therapy    Goal Priority Disciplines Outcome Interventions   Physical Therapy Goal     PT, PT/OT Progressing    Description: Goals to be met by: DC     Patient will increase functional independence with mobility by performin. Supine to sit with [place]  2. Sit to supine with [place]  3. Sit to stand transfer with Supervision with RW  4. Gait  x 150 feet with Contact Guard Assistance using Rolling Walker.                          DME Justifications:  Jordin Allen Jr. has a mobility limitation that significantly impairs his ability to participate in one or more mobility related activities of daily living (MRADLs) such as toileting, feeding, dressing, grooming, and bathing in customary locations in the home.  The mobility limitation cannot be sufficiently resolved by the use of a cane or walker.   The use of a manual wheelchair will significantly improve  the patients ability to participate in MRADLS and the patient will use it on regular basis in the home.  Jordin Allen Jr. has expressed his willingness to use a manual wheelchair in the home. Patients upper body strength is sufficient for propulsion.  He also has a caregiver who is available, willing, and able to provide assistance with the wheelchair when needed.      Time Tracking:     PT Received On: 06/24/25  PT Start Time: 1000     PT Stop Time: 1040  PT Total Time (min): 40 min     Billable Minutes: Therapeutic Activity 15, Therapeutic Exercise 15, and Neuromuscular Re-education 10    Treatment Type: Treatment  PT/PTA: PTA     Number of PTA visits since last PT visit: 1 06/24/2025

## 2025-06-24 NOTE — PROGRESS NOTES
[facility] Medicine   Progress Note  Room: 207/207   Patient Name: Jordin Allen Jr.  MRN: [id number]  Admit Date: 6/3/2025   Length of Stay:  LOS: 21 days   Attending Physician: Bernardino Guthrie MD  Nurse Practitioner: Ernestine Palomino NP    Date of Service: 06/24/2025    Principal Problem:    Acute on chronic respiratory failure with hypoxia    Brief HPI:     Jordin Allen Jr. is a 79 y.o. 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  He presented to [facility] ED on 01/22/2025 with complaints of shortness of breath and left foot redness.  Admitted to hospital medicine team for left foot cellulitis and acute hypoxic respiratory failure secondary to pneumonia/COPD exacerbation. CTA chest negative for PE. Emphysematous change with superimposed edema. Nodular focus within the anterior aspect of left upper lobe.  Started on nebs, prednisone, and empiric vanc/cefepime and doxycycline.  Respiratory panel positive for parainfluenza virus.  Course further complicated by sinus tach with PACs/18, for which he was started on diltiazem.  Blood cultures positive for staph, suspected to be contaminant.  Repeat blood cultures returned negative.  He will switch to Augmentin and doxy.  Ongoing episodes of SVT, thought to be due to underlying hypoxia.  He was able to be weaned down to high-flow nasal cannula 15 L.  Discharged to [facility] on 06/03/2025 for rehab, wound care, and O2 weaning.     6/9-6/11-sent out to [facility] for concerns of MI. Workup revealed findings concerning for pneumonia on CT. Started on empiric vanc and zosyn. Also started on 5 day course of prednisone for COPD exacerbation.    6/18 - 6/22-sent out to [facility] again for increasing O2 requirements and worsening AFib with RVR on diltiazem drip.  Use switch to amiodarone drip at [facility],  and transitioned to p.o. amiodarone successfully.  Infectious workup revealed concerns for aspiration pneumonia.  He was treated with vanc, cefepime, and levofloxacin.  Antibiotics de-escalated to oral levofloxacin.  He was weaned from BiPAP to nasal cannula at 3-5 L.      Interval History    Episodes of shortness of breath and desaturations overnight.  Repositioned in bed with improvement  On 4 L nasal cannula this morning with O2 sats 94%  Remains afebrile   Continue levofloxacin, completes course on 06/26        Subjective:     Review of Systems   Constitutional:  Positive for malaise/fatigue.   Respiratory:  Negative for cough, sputum production and shortness of breath.    Cardiovascular:  Negative for chest pain and leg swelling.   Gastrointestinal:  Negative for heartburn, nausea and vomiting.   Genitourinary:  Negative for dysuria and urgency.   Musculoskeletal:  Positive for joint pain (R shoulder). Negative for myalgias.   Neurological:  Positive for weakness. Negative for dizziness.   Psychiatric/Behavioral:  Negative for depression. The patient does not have insomnia.          Objective:     Vital Sign Range  Temp:  [97.1 °F (36.2 °C)-97.8 °F (36.6 °C)]   Pulse:  []   Resp:  [17-26]   BP: (128-157)/(73-89)   SpO2:  [89 %-100 %]     Body mass index is 28.65 kg/m².           Intake/Output Summary (Last 24 hours) at 6/24/2025 1010  Last data filed at 6/24/2025 0849  Gross per 24 hour   Intake 480 ml   Output 1000 ml   Net -520 ml       Physical Exam  Constitutional:       Appearance: He is ill-appearing.   HENT:      Head: Normocephalic and atraumatic.      Mouth/Throat:      Mouth: Mucous membranes are moist.      Pharynx: Oropharynx is clear.   Eyes:      Extraocular Movements: Extraocular movements intact.      Pupils: Pupils are equal, round, and reactive to light.   Cardiovascular:      Rate and Rhythm: Normal rate. Rhythm irregular.   Pulmonary:      Breath sounds: Wheezing and rhonchi present.    Abdominal:      General: Bowel sounds are normal.      Palpations: Abdomen is soft.   Musculoskeletal:      Right lower leg: Edema present.      Left lower leg: Edema present.   Skin:     General: Skin is warm and dry.   Neurological:      Mental Status: He is alert and oriented to person, place, and time. Mental status is at baseline.   Psychiatric:         Mood and Affect: Affect is flat.         Wounds       Wound 05/19/25 0233 Moisture associated dermatitis Left dorsal Foot (Active)   05/19/25 0233 Foot   Present on Original Admission: Y   Primary Wound Type: Moisture ass   Side: Left   Orientation: dorsal        Wound 05/19/25 0231 Moisture associated dermatitis Left lateral Foot (Active)   05/19/25 0231 Foot   Present on Original Admission: Y   Primary Wound Type: Moisture ass   Side: Left   Orientation: lateral        Wound 05/19/25 0231 Pressure Injury Right anterior Ankle (Active)   05/19/25 0231 Ankle   Present on Original Admission: Y   Primary Wound Type: Pressure inj   Side: Right   Orientation: anterior        Wound 05/19/25 0233 Moisture associated dermatitis Left anterior Foot (Active)   05/19/25 0233 Foot   Present on Original Admission:    Primary Wound Type: Moisture ass   Side: Left   Orientation: anterior        Wound 06/04/25 1030 Moisture associated dermatitis Right lateral Abdomen (Active)   06/04/25 1030 Abdomen   Present on Original Admission: Y   Primary Wound Type: Moisture ass   Side: Right   Orientation: lateral        Wound 06/04/25 1030 Moisture associated dermatitis Left lateral Abdomen (Active)   06/04/25 1030 Abdomen   Present on Original Admission: Y   Primary Wound Type: Moisture ass   Side: Left   Orientation: lateral        Wound 06/04/25 1030 Moisture associated dermatitis Right Groin (Active)   06/04/25 1030 Groin   Present on Original Admission: Y   Primary Wound Type: Moisture ass   Side: Right        Wound 06/04/25 1030 Moisture associated dermatitis Left Groin (Active)  "  06/04/25 1030 Groin   Present on Original Admission: Y   Primary Wound Type: Moisture ass   Side: Left        Wound 06/04/25 1030 Moisture associated dermatitis Sacral spine (Active)   06/04/25 1030 Sacral spine   Present on Original Admission: Y   Primary Wound Type: Moisture ass         Wounds consistently followed by [facility]     Labs:  Recent Labs   Lab 06/21/25  0448 06/22/25  0759 06/23/25  0351   WBC 10.60 11.22 11.91   HGB 8.9* 10.3* 10.2*   HCT 27.3* 33.0* 33.3*    235 206     Recent Labs   Lab 06/21/25 0448 06/22/25  0759 06/23/25  0520    145 143   K 4.1 4.5 4.6    106 107   CO2 27 27 27   BUN 41* 35* 35*   CREATININE 1.2 1.0 0.9   * 90 159*   CALCIUM 8.9 9.7 9.2   MG 2.2 2.1 2.1   PHOS 2.1* 2.3* 2.4*     Recent Labs   Lab 06/21/25 0448 06/22/25  0759 06/23/25  0520   ALKPHOS 100 112 120   ALT 84* 95* 89*   AST 53* 48* 42   ALBUMIN 1.9* 2.3* 2.1*   PROT 5.1* 5.6* 5.3*   BILITOT 0.4 0.5 0.6       No results for input(s): "POCTGLUCOSE" in the last 72 hours.      Meds Scheduled:   amiodarone  400 mg Oral BID    Followed by    [START ON 7/1/2025] amiodarone  200 mg Oral Daily    amitriptyline  25 mg Oral QHS    ammonium lactate   Topical (Top) Daily    apixaban  5 mg Oral BID    aspirin  81 mg Oral QAM    atorvastatin  80 mg Oral Daily    fluticasone propionate  2 spray Each Nostril Daily    guaiFENesin  1,200 mg Oral BID    levalbuterol  1.2495 mg Nebulization Q6H    levETIRAcetam  500 mg Oral BID    levoFLOXacin  750 mg Oral Daily    mirtazapine  15 mg Oral QHS    pantoprazole  40 mg Oral Daily    roflumilast  500 mcg Oral Daily    sodium chloride 3%  4 mL Nebulization BID    tiotropium bromide  2 puff Inhalation Daily       Current Inpatient Problem List:  Active Hospital Problems    Diagnosis  POA    *Acute on chronic respiratory failure with hypoxia [J96.21]  Yes    Parainfluenza virus infection [B34.8]  Yes    Pneumonia of right lung due to " infectious organism [J18.9]  Yes    Cellulitis [L03.90]  Yes    TIA (transient ischemic attack) [G45.9]  Yes     ASA and statin      Metastasis to brain [C79.31]  Yes    Adenocarcinoma, lung, 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       CAD (coronary artery disease) [I25.10]  Yes     Chronic     -LHC (10/31/13) for stemi: pLAD 100%, Cx with Li's, mRCA 40%, LVEF 55%,. LVEDP 15   -Xience 3.0 x 33 mm to pLAD, post dilated with 3.5 NC and 4.0 NC.   -TTE (8/14/13): LVEF 55%, grade I diastolic dysfunction      HTN (hypertension), benign [I10]  Yes    SHOLA (obstructive sleep apnea) [G47.33]  Yes     CPAP at night      GERD (gastroesophageal reflux disease) [K21.9]  Yes     Continue PPI        Resolved Hospital Problems   No resolved problems to display.         Assessment / Plan:     Acute respiratory failure with hypoxia  COPD with acute exacerbation  Parainfluenza virus infection  Pneumonia   On Symbicort Spiriva and Daliresp at home. Follows up with pulmonology outpatient.   Completed 7 day course of Augmentin and doxycycline on 06/01  Continue Daliresp 500mcg daily  Continue Xopenex p.r.n.   Continue weaning high-flow nasal cannula  Continue guaifenesin 1200 mg b.i.d.  O2 requirements are stable, currently on 4 L nasal cannula.  Continue levofloxacin, completes course on 06/26  Pulmonology consulted, appreciate recommendations    Chest pain, new   Resolved   Evaluated by cardiology while in the hospital  Will need outpatient follow up with Dr. Ba with Cardiology     Cellulitis   Wound followed and managed by consistent, contracted [facility] NP  Completed 7 day course of doxy and Augmentin  Will need follow up with Podiatry after discharge     Restless leg syndrome   Continue amitriptyline 25 mg nightly     Adenocarcinoma, lung, left   Metastasis to  brain  Completed treatment with chemo/XRT. Brain XRT for lesionsx2. off immunotherapy since 12/24  suspect the LLL area was consolidation of prior RXT changes and not malignancy and is nearly resolved.  CT on presentation with new SANJAY nodule, need op follow up with pulm  Continue prophylactic Keppra 500 mg b.i.d.    Right shoulder pain  Having some shoulder pain to his right shoulder, which seems to be some muscle spasms.    Started Robaxin 500 mg q.i.d. p.r.n. on 6/6    Compression fracture of L1 vertebrae with routine healing   TLSO brace when out of bed  F/u with Dr. Hopkins     Dyslipidemia  TIA  CAD   Continue aspirin   Continue atorvastatin 80 mg daily     Tachycardia   Episodes of SVT while at the hospital   Evaluated by Cardiology   Likely worsened by underlying hypoxia   Supplemental O2   Uncontrolled AFib with RVR on 06/16, he was placed on diltiazem drip though rate remained difficult to control  Sent back to [facility] and ultimately started on amiodarone for rate control   Continue amiodarone 400 mg b.i.d. for 8 days, then transition to amiodarone 200 mg daily       SHOLA   Continue CPAP nightly     GERD  Continue PPI daily     Left lateral foot wound   Right anterior ankle wound   Traumatic left dorsal foot wound   Left anterior foot venous ulcer   Skin tear right distal arm   Pressure injury nose  Wound followed and managed by consistent, contracted [facility] NP    Advance Care Planning, 06/05/2025  This was a voluntary visit and the option to decline counseling was given  Length of discussion:  16 minutes  Life limiting problem:  Respiratory failure, adenocarcinoma  Topics discussed:  Code status  Outcome of discussion:  Patient would like to remain a full code.  In the event that he is not able to make his own decisions, he would like to defer decision making to his daughter  Decision Maker:Jordin Allen     Psychiatric Assessment  Patient Health Questionnaire-9 (PHQ-9)    Date:  06/05/2025  Total Score:  15  Screening is Positive   Diagnosis and Treatment Plan:  Moderate MDD   Continue amitriptyline 25 mg nightly   Increasing mirtazapine to 15 mg nightly, which will hopefully help with his appetite as well       Diet:  Cardiac   GI Ppx:  PPI   DVT Ppx:  Enoxaparin     Lines:  PIV   Drains:  None   Airways:n/a   Wounds:  Multiple    Goals of Care:   Restorative,  Treat infection  Optimize nutrition  Wound healing  Muscle strengthening  Restoration of ADL's  Improve mobility    Anticipated Disposition:    Anticipated d/c 6/24 IPR vs home    Follow up appointments:   Future Appointments   Date Time Provider Department Center   7/1/2025 10:30 AM [facility]RAY [facility]   7/1/2025 11:30 AM Bolivar Sr PA [facility] NEUROS8 [facility]   7/8/2025  9:15 AM MEEKS, VISUAL FIELDS [facility] OPHTHAL [facility]   7/8/2025 10:00 AM Cara Looney MD [facility] OPHTHAL [facility]   7/15/2025 10:00 AM Garrett Lloyd DPM NOM POD [facility] Ort   7/23/2025 10:15 AM CV [facility] ECHO [facility] CARDIA [place]   7/30/2025  8:30 AM [facility] OIC-MRI1 [facility] MRI [facility]   7/30/2025 10:00 AM Bienvenido Henson MD [facility] RAD ONC [facility]   8/15/2025  9:20 AM Bienvenido Ward MD [facility] PULMON [place]   8/27/2025  9:00 AM Giudo Trejo MD [facility] ENT [facility]   9/11/2025  8:30 AM Yan Palmer MD [facility] DERM [facility]         Ernestine Palomino NP  Hospital Medicine [facility]    I have spent 61 minutes reviewing patient records, examining, and  of the patient/ patient's family with greater than 50% of the time spent with direct patient care and coordination.

## 2025-06-24 NOTE — PLAN OF CARE
Pt with 3 episodes of shortness of breath with O2 sats dropping as low as 82%. Pt reports being sound asleep and waking up having scooted down in bed and being short of breath. Each time pt was pulled up in bed. HOB elevated and O2 increased from 4-6L NC. Pt takes approx 7-10min to fully recover to baseline. Pt reported cough at start of shift and PRN tessalon pearls given. Pt reports minimal coughing after. Continent of bowel and bladder. Pt able to make needs known. Repositioned q2h. BUE elevated 2/2 edema.       Problem: Adult Inpatient Plan of Care  Goal: Plan of Care Review  Outcome: Progressing  Goal: Patient-Specific Goal (Individualized)  Outcome: Progressing  Goal: Absence of Hospital-Acquired Illness or Injury  Outcome: Progressing  Goal: Optimal Comfort and Wellbeing  Outcome: Progressing  Goal: Readiness for Transition of Care  Outcome: Progressing     Problem: Pneumonia  Goal: Fluid Balance  Outcome: Progressing  Goal: Resolution of Infection Signs and Symptoms  Outcome: Progressing  Goal: Effective Oxygenation and Ventilation  Outcome: Progressing     Problem: Wound  Goal: Optimal Coping  Outcome: Progressing  Goal: Optimal Functional Ability  Outcome: Progressing  Goal: Absence of Infection Signs and Symptoms  Outcome: Progressing  Goal: Improved Oral Intake  Outcome: Progressing  Goal: Optimal Pain Control and Function  Outcome: Progressing  Goal: Skin Health and Integrity  Outcome: Progressing  Goal: Optimal Wound Healing  Outcome: Progressing     Problem: Skin Injury Risk Increased  Goal: Skin Health and Integrity  Outcome: Progressing     Problem: Fall Injury Risk  Goal: Absence of Fall and Fall-Related Injury  Outcome: Progressing     Problem: Breathing Pattern Ineffective  Goal: Effective Breathing Pattern  Outcome: Progressing     Problem: Airway Clearance Ineffective  Goal: Effective Airway Clearance  Outcome: Progressing     Problem: Gas Exchange Impaired  Goal: Optimal Gas Exchange  Outcome:  Progressing     Problem: Noninvasive Ventilation Acute  Goal: Effective Unassisted Ventilation and Oxygenation  Outcome: Progressing     Problem: Infection  Goal: Absence of Infection Signs and Symptoms  Outcome: Progressing

## 2025-06-24 NOTE — CARE UPDATE
06/23/25 1945   Patient Assessment/Suction   Level of Consciousness (AVPU) alert   Respiratory Effort Unlabored   Expansion/Accessory Muscles/Retractions no use of accessory muscles;no retractions;expansion symmetric   All Lung Fields Breath Sounds coarse   Rhythm/Pattern, Respiratory unlabored;pattern regular   Cough Frequency infrequent   Cough Type nonproductive   PRE-TX-O2   Device (Oxygen Therapy) nasal cannula with humidification   $ Is the patient on Low Flow Oxygen? Yes   Flow (L/min) (Oxygen Therapy) 4   SpO2 (!) 90 %   Pulse Oximetry Type Continuous   $ Pulse Oximetry - Multiple Charge Pulse Oximetry - Multiple   Pulse 101   Resp (!) 26   Positioning HOB elevated 30 degrees   Aerosol Therapy   $ Aerosol Therapy Charges Aerosol Treatment   Respiratory Treatment Status (SVN) given   Treatment Route (SVN) mask   Patient Position HOB elevated   Post Treatment Assessment (SVN) breath sounds unchanged   Signs of Intolerance (SVN) none   Breath Sounds Post-Respiratory Treatment   Post-treatment Heart Rate (beats/min) 93   Post-treatment Resp Rate (breaths/min) 24   Vibratory PEP Therapy   $ Vibratory PEP Charges Acapella Therapy   $ Vibratory PEP Equipment Blue Acapella - equipment   Daily Review of Necessity (PEP Therapy) completed   Type (PEP Therapy) vibratory/oscillatory   Device (PEP Therapy) Acapella low (blue)   Route (PEP Therapy) mouthpiece   Breaths per Cycle (PEP Therapy) 10   Cycles (PEP Therapy) 1   Patient Position (PEP Therapy) HOB elevated   Post Treatment Assessment (PEP) breath sounds unchanged   Signs of Intolerance (PEP Therapy) none   Respiratory Evaluation   $ Care Plan Tech Time 15 min

## 2025-06-24 NOTE — CARE UPDATE
06/24/25 1837   Patient Assessment/Suction   Level of Consciousness (AVPU) alert   Respiratory Effort Unlabored   Expansion/Accessory Muscles/Retractions no use of accessory muscles;no retractions;expansion symmetric   All Lung Fields Breath Sounds diminished   Rhythm/Pattern, Respiratory unlabored;pattern regular;depth regular   Cough Frequency infrequent   Cough Type good;dry;nonproductive   PRE-TX-O2   Device (Oxygen Therapy) nasal cannula with humidification   $ Is the patient on Low Flow Oxygen? Yes   Flow (L/min) (Oxygen Therapy) 4   SpO2 (!) 91 %   Pulse Oximetry Type Continuous   $ Pulse Oximetry - Multiple Charge Pulse Oximetry - Multiple   Pulse 104   Resp (!) 27   Aerosol Therapy   $ Aerosol Therapy Charges Aerosol Treatment   Respiratory Treatment Status (SVN) given   Treatment Route (SVN) air;mask   Patient Position HOB elevated   Post Treatment Assessment (SVN) increased aeration   Signs of Intolerance (SVN) none   Breath Sounds Post-Respiratory Treatment   Throughout All Fields Post-Treatment All Fields   Throughout All Fields Post-Treatment aeration increased   Post-treatment Heart Rate (beats/min) 123   Post-treatment Resp Rate (breaths/min) 19   Vibratory PEP Therapy   $ Vibratory PEP Charges Acapella Therapy   $ Vibratory PEP Tech Time Charges 15 min   Type (PEP Therapy) vibratory/oscillatory   Device (PEP Therapy) Acapella low (blue)   Route (PEP Therapy) mouthpiece   Breaths per Cycle (PEP Therapy) 10   Cycles (PEP Therapy) 1   Patient Position (PEP Therapy) HOB elevated   Post Treatment Assessment (PEP) breath sounds unchanged   Signs of Intolerance (PEP Therapy) none   General Safety Checklist   Safety Promotion/Fall Prevention side rails raised   Airway Safety   Is Ambu Bag and Mask with Patient? Yes, Adult Ambu Bag and Mask   Suction set is at the bedside? Yes   Respiratory Interventions   Cough And Deep Breathing done independently per patient   Airway/Ventilation Management calming  measures promoted;humidification applied;pulmonary hygiene promoted   Airway/Ventilation Support comfort measures provided;cough relief provided;dyspnea relief promoted;humidification applied;pulmonary hygiene promoted   Breathing Techniques/Airway Clearance deep/controlled cough encouraged   Preset CPAP/BiPAP Settings   Mode Of Delivery home unit;standby   CPAP/BIPAP charged w/in last 24 h NO   Patient CPAP/BiPAP Settings   CPAP/BIPAP ID home unit   Education   $ Education Bronchodilator;Oxygen;PEP Therapy   Respiratory Evaluation   $ Care Plan Tech Time 15 min   Oxygen Care Plan   Oxygen Care Plan Per Protocol   SPO2 Goal (%) 92% non-cardiac   Rationale SpO2 is <MD Goal   Bronchodilator Care Plan   Bronchodilator Care Plan Aerosol   Aerosol Meds w/ frequency Sodium Chloride 3% BID  (q6 1.2495mg xopenex)   Rationale Shortness of breath (increased WOB)   Airway Clearance Care Plan   Airway Clearance Care Plan PEP Therapy   Frequency TID   Rationale Rhonchi

## 2025-06-24 NOTE — PROGRESS NOTES
[facility]  Wound Care Progress Note  Attending Physician: Bernardino Guthrie MD  Primary Care Physician: Sierra Landry MD  Date of Admit: 6/3/2025      Chief Complaint    Wound to left leg   History of Present Illness:     Per attending   HPI: Jordin Allen Jr. Is a 79 y.o.M with pmhx of COPD on chronic 4L NC, GERD, HTN, SHOLA, CAD, NAFLD, dyslipidemia, L lung cancer s/p chemo and radiation c/b radiation necrosis, off immunotherapy since 12/24 metastasis to brain s/p XRT, anxiety, who [place] [facility] [place] with acute onset need for increased oxygen associated with chest pain beginning last night. Patient has not felt this chest pain since the episode. Currently no chest pain. Does endorse SOB and cough. Reports he is not coughing up anything because he feels like it is stuck in his chest. Denies fever, chills, chest pain, palpitations,  abdominal pain, n/v/d, dysuria, headaches, or any other symptoms at this time.      In ED: AF, VSS on 6-8L HFNC. CBC with leukocytosis. Hgb 11.9. CMP notable for Na 135, mild elevation in ALT. Phos 2.4. BNP 62. HS trop 12. LA 0.9. covid/flu negative. ABG with pH 7.499, pCO2 56.3, pO2 79, HCO3 43.8. Blood cultures pending. CTA chest with no evidence of acute PE, mild ill-defined airspace opacities in the dependent portions of the right upper and middle lobes when compared to 05/22/2025, nonspecific.  This could be due to atelectasis, aspiration and/or pneumonia 5 mm left upper lobe nodule, unchanged from 05/22/2025 but new when compared to 03/05/2025.  A follow-up chest CT in 1 year could be considered if the patient is at increased risk of developing lung cancer, such as from a smoking history. S/p duoneb, dexamethasone 10mg inj, vanc and zosyn in ED. Admitted to  for further evaluation.     6/4/2025 Patient seen for wound to left leg  Patient seen lying in bed. No acute distress. Wound care done with nurse. No issues or complaints at  time. Cont POC Plan to f/u on 6/5 6/6/2025 Patient seen lying in bed. No acute distress. Wound care done with nurse. No issues or complaints at time. Cont POC Plan to f/u on 6/9 6/12/2025 Patient seen lying in bed. Patient returned from acute stay. No acute distress. Wound care done with nurse. No issues or complaints at time. Cont POC Plan to f/u on 6/13 6/13/2925 Patient seen lying in bed. No acute distress. Wound care done with nurse. No issues or complaints at time. Cont POC Plan to f/u on  6/16 6/16/2025 Patient seen lying in bed. No acute distress. Wound care done with nurse. No issues or complaints at time. Cont POC Plan to f/u on  6/17 6/17/2025 Patient seen lying in bed. No acute distress. Wound care done with nurse. No issues or complaints at time. Cont POC     6/23/2025 Patient seen lying in bed. No acute distress. Wound care done with nurse. No issues or complaints at time. Cont POC Plan to f/u on 6/24    Past medical history:     Past Medical History:   Diagnosis Date    Benign neoplasm of colon 10/01/2013    Bronchitis chronic     CAD (coronary artery disease) 10/31/2013    Cataract 2008    COPD (chronic obstructive pulmonary disease)     Emphysema of lung     Encounter for preventive health examination 03/21/2018    GERD (gastroesophageal reflux disease)     Glaucoma 2010    Hearing loss 2015    Heart attack 2013    Hx of colonic polyps     Hypertension     NAFLD (nonalcoholic fatty liver disease) 03/27/2017    SHOLA on CPAP     RLS (restless legs syndrome)     Screen for colon cancer 08/30/2013     Past medical history was reviewed and was otherwise negative except as above.    Past surgical history:     Past Surgical History:   Procedure Laterality Date    bilateral foot surgery  1993    CARDIAC CATHETERIZATION  2013    x1    CATARACT EXTRACTION, BILATERAL      COLON SURGERY  2013    Ace Mott MD    COLONOSCOPY N/A 03/21/2018    Procedure: COLONOSCOPY;  Surgeon: Terry Mott MD;   Location: [facility] ENDO (4TH FLR);  Service: Endoscopy;  Laterality: N/A;    COLONOSCOPY N/A 04/12/2019    Procedure: COLONOSCOPY;  Surgeon: John Mantilla MD;  Location: [facility] ENDO (4TH FLR);  Service: Endoscopy;  Laterality: N/A;    COLONOSCOPY N/A 05/31/2022    Procedure: COLONOSCOPY;  Surgeon: MEDINA Farris MD;  Location: [facility] ENDO (4TH FLR);  Service: Endoscopy;  Laterality: N/A;  fully vacc-inst portal-tb    ENDOBRONCHIAL ULTRASOUND Bilateral 04/30/2024    Procedure: ENDOBRONCHIAL ULTRASOUND (EBUS);  Surgeon: Naveed Aguero MD;  [facility];  Service: Pulmonary;  Laterality: Bilateral;    EYE SURGERY  2011    scrotal abscess removal      TYMPANOSTOMY TUBE PLACEMENT  2017     Past surgical history was reviewed and was noncontributory except as above.    Allergies:     Review of patient's allergies indicates:   Allergen Reactions    Brilinta [ticagrelor] Itching    Lisinopril      Other reaction(s): cough    Metoprolol succinate Rash     Allergies were reviewed and were negative except as above.    Home Medications:     Prior to Admission medications    Medication Sig Start Date End Date Taking? Authorizing Provider   acetaminophen (TYLENOL) 500 MG tablet Take 500 mg by mouth 2 (two) times daily as needed for Pain.    Provider, Historical   albuterol (ACCUNEB) 0.63 mg/3 mL Nebu Inhale 3 mLs (0.63 mg total) by nebulization every 6 (six) hours as needed (if symptoms failed to improve with inhaler). Rescue 12/10/24   Bienvenido Ward MD   albuterol (PROVENTIL/VENTOLIN HFA) 90 mcg/actuation inhaler Inhale 2 puffs into the lungs every 4 (four) hours as needed for Wheezing. 12/10/24   Bienvenido Ward MD   amitriptyline (ELAVIL) 25 MG tablet Take 1 tablet (25 mg total) by mouth once daily.  Patient taking differently: Take 25 mg by mouth every evening. 9/3/24   Sierra Landry MD   aspirin (ECOTRIN) 81 MG EC tablet Take 81 mg by mouth every morning.    Provider, Historical   atorvastatin  (LIPITOR) 80 MG tablet Take 1 tablet (80 mg total) by mouth in the morning. 4/21/25   Sierra Landry MD   BETA-CAROTENE,A, W-C & E/MIN (OCUVITE ORAL) Take 1 capsule by mouth once daily.     Provider, Historical   budesonide-glycopyr-formoterol (BREZTRI AEROSPHERE) 160-9-4.8 mcg/actuation HFAA Inhale 2 puffs into the lungs 2 (two) times a day. 12/10/24   Bienvenido Ward MD   dexAMETHasone (DECADRON) 4 MG Tab Take 1 tablet (4 mg total) by mouth 2 (two) times daily.  Patient not taking: Reported on 5/16/2025 3/26/25   Bienvenido Henson MD   echinacea 400 mg Cap Take 1 capsule by mouth once daily.     Provider, Historical   fluticasone propionate (FLONASE) 50 mcg/actuation nasal spray Spray 2 sprays (100 mcg total) in Each Nostril once daily. 10/23/24   Caren Shah MD   furosemide (LASIX) 20 MG tablet Take 1 tablet (20 mg total) by mouth once daily. 4/15/25 4/15/26  Bienvenido Ward MD   ibuprofen (ADVIL,MOTRIN) 200 MG tablet Take 200 mg by mouth every 8 (eight) hours as needed for Pain. 2/21/25   Provider, Historical   latanoprost 0.005 % ophthalmic solution Instill 1 drop into both eyes every night at bedtime 1/30/25      levETIRAcetam (KEPPRA) 500 MG Tab Take 1 tablet (500 mg total) by mouth 2 (two) times daily. 3/24/25 3/24/26  Janene Nelson PA-C   losartan (COZAAR) 100 MG tablet Take 1 tablet (100 mg total) by mouth once daily. 12/19/24   Prince Ba MD   nitroGLYCERIN (NITROSTAT) 0.4 MG SL tablet Place 1 tablet (0.4 mg total) under the tongue every 5 (five) minutes as needed. 5/6/24   Prince Ba MD   omeprazole (PRILOSEC) 20 MG capsule Take 20 mg by mouth once daily.    Provider, Historical   polyethylene glycol (GLYCOLAX) 17 gram/dose powder Take 17 grams by mouth every day for constipation prevention 5/20/25      roflumilast (DALIRESP) 500 mcg Tab Take 500 mcg by mouth every morning. 2/22/25   Provider, Historical   sennosides (SENNA ORAL) Take 1 tablet by mouth daily as  "needed (Constipation).    Provider, Historical   traZODone (DESYREL) 50 MG tablet Take 1 tablet (50 mg total) by mouth every evening. 25   Lou Muro NP       Family History:     Family History   Problem Relation Name Age of Onset    Heart disease Mother jc deutsch     Heart attack Mother jc deutsch     Hypertension Mother jc deutsch     No Known Problems Sister      No Known Problems Daughter 2     Diabetes Maternal Grandmother imtiaz jennings     Asthma Neg Hx      Emphysema Neg Hx      Prostate cancer Neg Hx      Cirrhosis Neg Hx       Family history was reviewed and was otherwise negative except as above.    Social History:   Social History[1]  Social history was reviewed and was otherwise negative except as above    Review of Systems   A 10 point review of systems was conducted and was negative except as described in the HPI.  Patient denies Chest Pain.  Patient denies shortness of breath.  Patient denies fevers.  Patient denies chills.    Physical Examination:   Triage: BP: 123/77  Pulse: 101  Temp: 97.6 °F (36.4 °C)  Resp: 18  Height: 5' 9" (175.3 cm)  Weight: 86.8 kg (191 lb 5.8 oz) (witness by SALO Escalante and C.C. RT.)  BMI (Calculated): 28.2  SpO2: 97 %  Exam: /89 (Patient Position: Lying)   Pulse 84   Temp 97.7 °F (36.5 °C) (Oral)   Resp (!) 22   Ht 5' 9" (1.753 m)   Wt 88.2 kg (194 lb 7.1 oz)   SpO2 98%   BMI 28.71 kg/m²     Vitals  BP  Min: 131/89  Max: 179/97  Temp  Av.5 °F (36.4 °C)  Min: 97.3 °F (36.3 °C)  Max: 97.8 °F (36.6 °C)  Pulse  Av.2  Min: 75  Max: 94  Resp  Av.1  Min: 18  Max: 25  SpO2  Av.1 %  Min: 90 %  Max: 100 %  Weight  Av.2 kg (194 lb 7.1 oz)  Min: 88.2 kg (194 lb 7.1 oz)  Max: 88.2 kg (194 lb 7.1 oz)    General Pt alert and oriented, not in apparent distress, good nutrition  Eyes: No conjunctival erythema; normal appearing lids  HEENT: Normocephalic atraumatic, mucous membranes moist  Cardiovascular: No edema  Respiratory: " Non-labored, no accessory muscle use, no wheezing  Abdomen: Soft, non tender, non distended, no rebound  Musculoskeletal: no clubbing or cyanosis of digits  Neuro: Grossly intact, weakness upper/lower extremities  Psych: Good mood, full affect, good insight  Skin:  06/23/25 1300         Wound 06/09/25 1313 Other (comment) Nose   Date First Assessed/Time First Assessed: 06/09/25 1313   Present on Original Admission: Yes  Primary Wound Type: (c) Other (comment)  Location: Nose   Wound Image    Dressing Appearance Open to air   Drainage Amount None   Drainage Characteristics/Odor No odor   Appearance Pink   Red (%), Wound Tissue Color 100 %   Periwound Area Dry   Wound Length (cm) 0 cm   Wound Width (cm) 0 cm   Wound Depth (cm) 0 cm   Wound Volume (cm^3) 0 cm^3   Wound Surface Area (cm^2) 0 cm^2   Dressing    (open to air)     Wound 06/18/25 1502 Rash Perineum   Date First Assessed/Time First Assessed: 06/18/25 1502   Present on Original Admission: Yes  Primary Wound Type: Rash  Location: Perineum   Wound Image                Distribution    (open to air, no rash)     Laboratory:  Most Recent Data:  CBC:   Lab Results   Component Value Date    WBC 11.91 06/23/2025    HGB 10.2 (L) 06/23/2025    HCT 33.3 (L) 06/23/2025     06/23/2025    MCV 91 06/23/2025    RDW 17.4 (H) 06/23/2025     BMP:   Lab Results   Component Value Date     06/23/2025    K 4.6 06/23/2025     06/23/2025    CO2 27 06/23/2025    BUN 35 (H) 06/23/2025    CREATININE 0.9 06/23/2025     (H) 06/23/2025    CALCIUM 9.2 06/23/2025    MG 2.1 06/23/2025    PHOS 2.4 (L) 06/23/2025     LFTs:   Lab Results   Component Value Date    PROT 5.3 (L) 06/23/2025    ALBUMIN 2.1 (L) 06/23/2025    BILITOT 0.6 06/23/2025    AST 42 06/23/2025    ALKPHOS 120 06/23/2025    ALT 89 (H) 06/23/2025    GGT 80 (H) 06/22/2025     Coags:   Lab Results   Component Value Date    INR 1.0 02/07/2025     FLP:   Lab Results   Component Value Date    CHOL 101 (L)  "02/07/2025    HDL 35 (L) 02/07/2025    LDLCALC 53.2 (L) 02/07/2025    TRIG 64 02/07/2025    CHOLHDL 34.7 02/07/2025     DM:   Lab Results   Component Value Date    HGBA1C 5.7 (H) 03/06/2024    HGBA1C 5.6 10/12/2023    HGBA1C 5.8 05/30/2013    GLUF 103 05/19/2014    LDLCALC 53.2 (L) 02/07/2025    CREATININE 0.9 06/23/2025     Thyroid:   Lab Results   Component Value Date    TSH 0.261 (L) 06/01/2025    FREET4 0.99 06/01/2025    K1OPADF 9.1 03/06/2024    U5JLHWQ 86 03/07/2017     Anemia:   Lab Results   Component Value Date    IRON 21 (L) 06/18/2025    TIBC 157 (L) 06/18/2025    FERRITIN 2,096.0 (H) 06/18/2025    [id number] 474 08/29/2024    FOLATE 4.5 08/29/2024     Cardiac:   Lab Results   Component Value Date    TROPONINI 10.800 (H) 06/08/2025    BNP 63 06/18/2025     Urinalysis:   Lab Results   Component Value Date    COLORU Yellow 06/09/2025    SPECGRAV 1.010 06/09/2025    NITRITE Negative 06/09/2025    KETONESU Trace (A) 02/07/2025    UROBILINOGEN Negative 06/09/2025    WBCUA 4 06/09/2025       Trended Lab Data:  Recent Labs   Lab 06/21/25  0448 06/22/25  0759 06/23/25  0351 06/23/25  0520   WBC 10.60 11.22 11.91  --    HGB 8.9* 10.3* 10.2*  --     235 206  --    MCV 88 91 91  --    RDW 17.0* 17.2* 17.4*  --     145  --  143   K 4.1 4.5  --  4.6    106  --  107   CO2 27 27  --  27   BUN 41* 35*  --  35*   * 90  --  159*   CALCIUM 8.9 9.7  --  9.2   PROT 5.1* 5.6*  --  5.3*   ALBUMIN 1.9* 2.3*  --  2.1*   BILITOT 0.4 0.5  --  0.6   AST 53* 48*  --  42   ALKPHOS 100 112  --  120   ALT 84* 95*  --  89*       Trended Cardiac Data:  Recent Labs   Lab 06/18/25  0754   BNP 63       Micro Results  No components found for: "CBLOOD"  No components found for: "CURINE"  No components found for: "CRESPWSM"    Radiology:  US Lower Extremity Veins Bilateral  Result Date: 6/18/2025  EXAMINATION: US LOWER EXTREMITY VEINS BILATERAL CLINICAL HISTORY: r/o dvt; TECHNIQUE: Duplex and color flow Doppler and " dynamic compression was performed of the bilateral lower extremity veins was performed. COMPARISON: 05/22/2025 FINDINGS: Duplex and color flow Doppler evaluation does not reveal any evidence of acute venous thrombosis in the common femoral, superficial femoral, greater saphenous, popliteal, peroneal, anterior tibial and posterior tibial veins bilaterally.  There is no reflux to suggest valvular incompetence.  Please note, there is scattered slow flow throughout the bilateral lower extremities.     No evidence of deep venous thrombosis in either lower extremity. Electronically signed by: Artur Velazquez MD Date:    06/18/2025 Time:    16:02    CT Chest Without Contrast  Result Date: 6/18/2025  EXAMINATION: CT CHEST WITHOUT CONTRAST CLINICAL HISTORY: Pneumonia, unresolved; TECHNIQUE: Using low dose technique the chest was surveyed from above the pulmonary apices through the posterior costophrenic angles.  Data was reconstructed for multiplanar images in axial, sagittal and coronal planes and for maximal intensity projection images in the the axial, sagittal and coronal planes. COMPARISON: Multiple priors, most recent chest x-ray 06/18/2025 and CTA 06/10/2025 FINDINGS: Support tubes and lines: None. Aorta: Mild ectasia of the ascending aorta measuring 4.1 cm, similar to prior.  Mild atherosclerosis.  Aberrant right subclavian artery. Heart: Normal size. Coronary arteries: Multi-vessel calcific atherosclerosis of the coronary arteries. Pericardium: Trace pericardial effusion. Central pulmonary arteries: Normal caliber. Base of neck/thyroid: Unremarkable. Lymph nodes: No supraclavicular, axillary, internal mammary, mediastinal, or hilar adenopathy. Esophagus: Unremarkable. Pleura: Bilateral small pleural effusions with adjacent compressive atelectasis, new from prior. Upper abdomen: Fatty atrophy of the pancreas, unchanged.  Accessory splenules. Body wall: Unremarkable. Airways: Unremarkable. Lungs: Paraseptal and  centrilobular emphysema.  Right and left ground-glass opacities, mostly affecting the lower lobes, new from prior.  Treatment changes of the left hilar region with associated volume loss and architectural distortion.  Stable 4 mm nodule in the left upper lobe. Bones: Degenerative changes of the spine.  Unchanged sclerotic focus in the posterior elements of T9.  Height loss of the superior endplate of L1, unchanged.     Interval development of bilateral small pleural effusions. Right and left ground-glass opacities, new from prior, concerning for progression of infectious process. Additional findings as above. Electronically signed by resident: Madeline Iniguez Date:    06/18/2025 Time:    10:07 Electronically signed by: Geovanny Dennis Date:    06/18/2025 Time:    11:16    X-Ray Chest AP Portable  Result Date: 6/18/2025  EXAMINATION: XR CHEST AP PORTABLE CLINICAL HISTORY: Chest Pain; TECHNIQUE: Single frontal view of the chest was performed. COMPARISON: 2025 FINDINGS: Similar perihilar and lower lobe infiltrates within both lungs.  Findings suggestive of pneumonia versus pulmonary edema depending upon clinical scenario.  Stable appearance of the cardiomediastinal contours.  Probable small bilateral pleural effusions.     See above Electronically signed by: Solitario Rivas Date:    06/18/2025 Time:    08:11    X-Ray Chest 1 View  Result Date: 6/17/2025  EXAMINATION: XR CHEST 1 VIEW CLINICAL HISTORY: worsening hypoxia; FINDINGS: Chest one view: Heart size is normal.  There is perihilar and lower lobe edema and/or infiltrate.  Bones bowel gas are noncontributory.  There is emphysema.     Pulmonary edema versus pneumonia. Emphysema. Electronically signed by: Alexander Blankenship MD Date:    06/17/2025 Time:    07:58    Echo  Result Date: 6/11/2025    Left Ventricle: The left ventricle is normal in size. Ventricular mass is normal. Normal wall thickness. There is low normal systolic function with a visually estimated ejection  fraction of 50 - 55%.   Inferolateral pseudodyskinesis   Right Ventricle: Right ventricle was not well visualized due to poor acoustic window. The right ventricle is normal in size Systolic function is mildly reduced.   IVC/SVC: Intermediate venous pressure at 8 mmHg.   RV function not well seen.     CTA Chest Non-Coronary (PE Studies)  Result Date: 6/10/2025  EXAMINATION: CTA CHEST NON CORONARY (PE STUDIES) CLINICAL HISTORY: r/o PE, signifcant tachycardia; TECHNIQUE: Low dose axial images, sagittal and coronal reformations were obtained from the thoracic inlet to the lung bases following the IV administration of 100 mL of Omnipaque 350.  Contrast timing was optimized to evaluate the pulmonary arteries.  MIP images were performed. COMPARISON: None FINDINGS: Pulmonary arteries:Pulmonary arteries are adequately enhanced.No filling defects to indicate pulmonary embolism. Thoracic soft tissues: Unremarkable. Aorta: Mild aneurysmal dilation of the ascending thoracic aorta to approximately 4.2 cm axial 256 of series 2. Heart: Normal size. No effusion. Nathalie/Mediastinum: No pathologic carolyn enlargement. Airways: Patent. Lungs/Pleura: Prominent emphysematous changes throughout the lungs. Mild interstitial infiltrate or scarring posterior right upper lobe. Mild left basilar atelectasis. Minimal left upper lobe ground-glass infiltrate. Esophagus: Unremarkable. Upper Abdomen: No abnormality of the partially imaged upper abdomen. Bones: Moderate chronic compression fracture superior endplate of L1. Aberrant right subclavian artery noted as an anatomic variant.     1. No evidence of pulmonary embolism. 2. Mild aneurysm dilation of the ascending thoracic aorta to approximately 4.2 cm.  Recommend surveillance. 3. Emphysematous changes of the lungs. 4. Mild interstitial infiltrate or scarring in the posterior right upper lobe and minimal left upper lobe infiltrate.  Minimal pneumonitis is not excluded. 5. Aberrant right subclavian  artery noted as an anatomic variant. 6. Moderate chronic compression fracture of the superior endplate of L1 Electronically signed by: Ryan Jackson Date:    06/10/2025 Time:    21:38    CTA Chest Non-Coronary (PE Studies)  Result Date: 6/9/2025  EXAMINATION: CTA CHEST NON CORONARY (PE STUDIES) CLINICAL HISTORY: Pulmonary embolism (PE) suspected, high prob; TECHNIQUE: During intravenous bolus injection of 100 ml of Omnipaque 350 contrast medium and using low dose technique, the chest was surveyed from above the pulmonary apices through the posterior costophrenic angles data was reconstructed for multiplanar images in axial, sagittal and coronal planes and for maximal intensity projection images in the the axial, sagittal and coronal planes. COMPARISON: Multiple priors, most recent CTA 05/22/2025 FINDINGS: Exam quality: Satisfactory. Pulmonary embolism: No acute or chronic pulmonary embolism. Central pulmonary arteries: Normal caliber. Aorta: Normal caliber.  Mild atherosclerosis.  Aberrant right subclavian artery. Heart: No right heart strain. Normal size. Coronary arteries: Multi-vessel calcific atherosclerosis of the coronary arteries. Pericardium: Normal. No effusion, thickening, or calcification. Support tubes and lines: None. Base of neck/thyroid: Normal. Lymph nodes: No supraclavicular, axillary, internal mammary, mediastinal, or hilar adenopathy. Esophagus: Normal. Pleura: No effusion, thickening, or calcification. Upper abdomen: Accessory splenule.  Fatty atrophy of the pancreas. Body wall: Unremarkable Airways: Normal. Lungs: Centrilobular and paraseptal emphysema.  Treatment changes of the left hilar region with associated volume loss and architectural distortion, stable when compared to prior imaging.  Right bandlike scarring versus atelectasis.  Left lower lobe calcified granuloma.  Ill-defined airspace opacities present in the dependent portions of the right upper and middle lobes are new when compared  to 05/22/2025.  A 4 mm nodule in the left upper lobe (series 3, image 257) is unchanged from 05/22/2025 but new when compared to 03/05/2025. Bones: Degenerative changes of the spine.  Height loss of the superioror endplate of L1, unchanged from 02/20/2025.  Unchanged sclerotic focus in the posterior elements of T9.     No evidence of acute pulmonary embolism. New mild ill-defined airspace opacities in the dependent portions of the right upper and middle lobes when compared to 05/22/2025, nonspecific.  This could be due to atelectasis, aspiration and/or pneumonia. 5 mm left upper lobe nodule, unchanged from 05/22/2025 but new when compared to 03/05/2025.  A follow-up chest CT in 1 year could be considered if the patient is at increased risk of developing lung cancer, such as from a smoking history. Additional findings as above. Electronically signed by resident: Madeline Iniguez Date:    06/09/2025 Time:    10:55 Electronically signed by: Anna Brown Date:    06/09/2025 Time:    11:55    X-Ray Chest AP Portable  Result Date: 6/9/2025  EXAMINATION: XR CHEST AP PORTABLE CLINICAL HISTORY: sob; TECHNIQUE: Single frontal view of the chest was performed. COMPARISON: 06/01/25 FINDINGS: Cardiac size is normal.  Lungs are clear with no significant infiltrate or vascular congestion.  No pleural fluid is seen.     See above Electronically signed by: Prince Ovalle MD Date:    06/09/2025 Time:    09:29    X-Ray Chest AP Portable  Result Date: 6/1/2025  EXAMINATION: XR CHEST AP PORTABLE CLINICAL HISTORY: palpitations; TECHNIQUE: Single frontal view of the chest was performed. COMPARISON: 05/26/2025. FINDINGS: The lungs are well expanded and clear. No focal opacities are seen. The pleural spaces are clear. The cardiac silhouette is unremarkable. The visualized osseous structures are unremarkable.     No acute abnormality. Electronically signed by: Popeye Mckeon Date:    06/01/2025 Time:    22:33    X-Ray Chest AP  Portable  Result Date: 5/26/2025  EXAMINATION: XR CHEST AP PORTABLE CLINICAL HISTORY: sob; FINDINGS: Chest one view AP portable. Heart size is normal.  Lungs are clear.  The bones are noncontributory.     No acute process seen. Electronically signed by: Alexander Blankenship MD Date:    05/26/2025 Time:    09:16    X-Ray Chest AP Portable  Result Date: 5/25/2025  EXAMINATION: XR CHEST AP PORTABLE CLINICAL HISTORY: chf; TECHNIQUE: Single frontal view of the chest was performed. COMPARISON: 05/22/2025 FINDINGS: The cardiomediastinal silhouette is stable in configuration noting calcification of the aorta..  There is no pleural effusion.  The trachea is midline.  The lungs are symmetrically expanded bilaterally with coarse interstitial attenuation, similar to the previous examination.  There is increased parenchymal attenuation projected over the medial aspect of the right lower lung zone, may reflect atelectasis, developing consolidation not excluded..  There is no pneumothorax.  The osseous structures are unchanged..     As above Electronically signed by: Artur Velazquez MD Date:    05/25/2025 Time:    19:17      Recent Results (from the past 24 hours)   CBC with Differential    Collection Time: 06/23/25  3:51 AM   Result Value Ref Range    WBC 11.91 3.90 - 12.70 K/uL    RBC 3.68 (L) 4.60 - 6.20 M/uL    HGB 10.2 (L) 14.0 - 18.0 gm/dL    HCT 33.3 (L) 40.0 - 54.0 %    MCV 91 82 - 98 fL    MCH 27.7 27.0 - 31.0 pg    MCHC 30.6 (L) 32.0 - 36.0 g/dL    RDW 17.4 (H) 11.5 - 14.5 %    Platelet Count 206 150 - 450 K/uL    MPV 10.2 9.2 - 12.9 fL    Nucleated RBC 0 <=0 /100 WBC   Manual Differential    Collection Time: 06/23/25  3:51 AM   Result Value Ref Range    Gran # (ANC) 11.6 K/uL    Segmented Neutrophil % 96.0 (H) 38.0 - 73.0 %    Bands % 1.0 %    Lymphocyte % 1.0 (L) 18.0 - 48.0 %    Monocyte % 2.0 (L) 4.0 - 15.0 %    Ovalocytes Occasional    Magnesium    Collection Time: 06/23/25  5:20 AM   Result Value Ref Range    Magnesium   2.1 1.6 - 2.6 mg/dL   Comprehensive Metabolic Panel    Collection Time: 06/23/25  5:20 AM   Result Value Ref Range    Sodium 143 136 - 145 mmol/L    Potassium 4.6 3.5 - 5.1 mmol/L    Chloride 107 95 - 110 mmol/L    CO2 27 23 - 29 mmol/L    Glucose 159 (H) 70 - 110 mg/dL    BUN 35 (H) 8 - 23 mg/dL    Creatinine 0.9 0.5 - 1.4 mg/dL    Calcium 9.2 8.7 - 10.5 mg/dL    Protein Total 5.3 (L) 6.0 - 8.4 gm/dL    Albumin 2.1 (L) 3.5 - 5.2 g/dL    Bilirubin Total 0.6 0.1 - 1.0 mg/dL     40 - 150 unit/L    AST 42 11 - 45 unit/L    ALT 89 (H) 10 - 44 unit/L    Anion Gap 9 8 - 16 mmol/L    eGFR >60 >60 mL/min/1.73/m2   Phosphorus    Collection Time: 06/23/25  5:20 AM   Result Value Ref Range    Phosphorus Level 2.4 (L) 2.7 - 4.5 mg/dL     A1C   Lab Results   Component Value Date    HGBA1C 5.7 (H) 03/06/2024         Assessment/Plan:       MASD to Sacrum and Buttocks   Wound care clean with bath wipes apply Triad daily  Turn patient every 2 hours      Cellulitis to Left lower leg -resolved  Dermatitis to left foot -resolved   -wash with bath wipes apply lac-hydrin daily per bedside nurse     Rash to:  RIGHT & LEFT GROIN-resolved   RIGHT & LEFT ABDOMEN-resolved   -clean with bath wipes apply antifungal powder daily per bedside nurse     Pressure wound stage I to right top ankle -resolved  -leave JOSSUE monitor     Decreased Mobility   -Patient with decreased bed mobility therefore patient is at high risk for developing wounds and deterioration of any current wounds. Patient needs frequent turning.     Nutrition :  Promote good nutrition to for improved wound healing.      Off-loading:   Follow facility bed policy for proper bed surface   Offload heels per facility protocol   Turn every 2 hours   Use Wheelchair Cushion     Patient Treatment Goals:  Short Term Goals  The wound base will be 25% .,demonstrate 25% more granulation tissue.  Short Term Goals  Patient wound status will improve/maintain without exacerbation  infection of deterioration.  Wound Healing  Patient will have a decrease in wound size by 20% in 4 weeks.  Clinical Goals  Prevention of Infection is important for wound healing  Functional Goals:  The patient will achieve proper turning schedule.    -cont medical management     High Risk Because  -Chronic illness with SEVERE exacerbation, progression, or side effects of treatment.  -Acute or chronic illness or injury that poses a threat to life or bodily function    Notify Sheree Tao NP, or wound care RN if any new issues arise or if there is deterioration in documented wounds.    Sheree Tao FNP-C                     [1]   Social History  Tobacco Use    Smoking status: Former     Current packs/day: 0.00     Average packs/day: 1 pack/day for 40.0 years (40.0 ttl pk-yrs)     Types: Cigarettes     Start date: 8/15/1972     Quit date: 8/15/2012     Years since quittin.8     Passive exposure: Never    Smokeless tobacco: Never   Substance Use Topics    Alcohol use: Yes     Alcohol/week: 3.0 standard drinks of alcohol     Types: 3 Cans of beer per week     Comment: maybe 2-3  beers weekly    Drug use: No

## 2025-06-24 NOTE — PT/OT/SLP PROGRESS
Occupational Therapy   Treatment    Name: Jordin Allen Jr.  MRN: [id number]  Admitting Diagnosis:  Acute on chronic respiratory failure with hypoxia       Recommendations:     Discharge Recommendations: Moderate Intensity Therapy  Discharge Equipment Recommendations:  wheelchair, hip kit, 3-in-1 commode  Barriers to discharge:       Assessment:     Jordin Allen Jr. is a 79 y.o. male with a medical diagnosis of Acute on chronic respiratory failure with hypoxia.  He presents with debility. Performance deficits affecting function are weakness, impaired endurance, impaired self care skills, impaired functional mobility, gait instability, impaired balance, decreased coordination, decreased upper extremity function, decreased lower extremity function, decreased safety awareness, pain, decreased ROM, impaired coordination, impaired cardiopulmonary response to activity, orthopedic precautions.     Rehab Prognosis:  guarded; patient would benefit from acute skilled OT services to address these deficits and reach maximum level of function.       Plan:     Patient to be seen 5 x/week to address the above listed problems via self-care/home management, therapeutic activities, therapeutic exercises, neuromuscular re-education  Plan of Care Expires: 07/23/25  Plan of Care Reviewed with: patient    Subjective     Chief Complaint: none stated  Patient/Family Comments/goals: no family in room  Pain/Comfort:       Objective:     Communicated with: Patient prior to session.  Patient found HOB elevated with   upon OT entry to room.    General Precautions: Standard, fall    Orthopedic Precautions:spinal precautions  Braces: TLSO  Respiratory Status: Nasal cannula, flow 4 L/min humidified     Occupational Performance:       Activities of Daily Living:  Grooming: stand by assistance    Toileting: dependence bowel and bladder incontinent      AMPAC 6 Click ADL: 14    Treatment & Education:  Patient completed 2x15 reps of ASHLIE therex  against gravity in available planes.   Patient educated that PTA would be coming to sit patient EOB and work on LE.   Patient verbalized understanding    Patient left HOB elevated with all lines intact and call button in reach    GOALS:   Multidisciplinary Problems       Occupational Therapy Goals          Problem: Occupational Therapy    Goal Priority Disciplines Outcome Interventions   Occupational Therapy Goal     OT, PT/OT Progressing    Description: Goals to be met by: 7/23/25  Patient will increase functional independence with ADLs by performing:    UE Dressing with Set-up Assistance.  LE Dressing with SBA using appropriate AE as needed.  Grooming while seated at sink with Set-up Assistance.  Toileting from 3-in-1 commode over toilet with min A for hygiene and clothing management.   Supine to sit with SPV   Step transfer with min A with RW.  Toilet transfer to 3-in-1 commode over toilet with min A with RW.                         DME Justifications:  No DME recommended requiring DME justifications    Time Tracking:     OT Date of Treatment: 06/24/25  OT Start Time: 0830  OT Stop Time: 0853  OT Total Time (min): 23 min    Billable Minutes:Therapeutic Exercise 23 min    OT/JOSSUE: JOSSUE     Number of JOSSUE visits since last OT visit: 1    6/24/2025

## 2025-06-24 NOTE — PLAN OF CARE
Patient doing okay today; he is very tired, sleeping most of day. Patient not eating much, stating he doesn't have much of an appetite.   No other issues today; no complaints of pain or distress.      Problem: Adult Inpatient Plan of Care  Goal: Plan of Care Review  Outcome: Progressing  Goal: Patient-Specific Goal (Individualized)  Outcome: Progressing  Goal: Absence of Hospital-Acquired Illness or Injury  Outcome: Progressing

## 2025-06-25 LAB
ABSOLUTE EOSINOPHIL (OHS): 0.06 K/UL
ABSOLUTE MONOCYTE (OHS): 0.62 K/UL (ref 0.3–1)
ABSOLUTE NEUTROPHIL COUNT (OHS): 11.1 K/UL (ref 1.8–7.7)
ALBUMIN SERPL BCP-MCNC: 2 G/DL (ref 3.5–5.2)
ALP SERPL-CCNC: 95 UNIT/L (ref 40–150)
ALT SERPL W/O P-5'-P-CCNC: 55 UNIT/L (ref 10–44)
ANION GAP (OHS): 7 MMOL/L (ref 8–16)
AST SERPL-CCNC: 20 UNIT/L (ref 11–45)
BASOPHILS # BLD AUTO: 0.01 K/UL
BASOPHILS NFR BLD AUTO: 0.1 %
BILIRUB SERPL-MCNC: 0.6 MG/DL (ref 0.1–1)
BUN SERPL-MCNC: 29 MG/DL (ref 8–23)
CALCIUM SERPL-MCNC: 8.6 MG/DL (ref 8.7–10.5)
CHLORIDE SERPL-SCNC: 103 MMOL/L (ref 95–110)
CO2 SERPL-SCNC: 29 MMOL/L (ref 23–29)
CREAT SERPL-MCNC: 0.8 MG/DL (ref 0.5–1.4)
ERYTHROCYTE [DISTWIDTH] IN BLOOD BY AUTOMATED COUNT: 17.7 % (ref 11.5–14.5)
GFR SERPLBLD CREATININE-BSD FMLA CKD-EPI: >60 ML/MIN/1.73/M2
GLUCOSE SERPL-MCNC: 134 MG/DL (ref 70–110)
HCT VFR BLD AUTO: 30.7 % (ref 40–54)
HGB BLD-MCNC: 9.9 GM/DL (ref 14–18)
IMM GRANULOCYTES # BLD AUTO: 0.53 K/UL (ref 0–0.04)
IMM GRANULOCYTES NFR BLD AUTO: 4.2 % (ref 0–0.5)
LYMPHOCYTES # BLD AUTO: 0.21 K/UL (ref 1–4.8)
MAGNESIUM SERPL-MCNC: 2 MG/DL (ref 1.6–2.6)
MCH RBC QN AUTO: 28.9 PG (ref 27–31)
MCHC RBC AUTO-ENTMCNC: 32.2 G/DL (ref 32–36)
MCV RBC AUTO: 90 FL (ref 82–98)
NUCLEATED RBC (/100WBC) (OHS): 0 /100 WBC
PHOSPHATE SERPL-MCNC: 2.7 MG/DL (ref 2.7–4.5)
PLATELET # BLD AUTO: 133 K/UL (ref 150–450)
PLATELET BLD QL SMEAR: ABNORMAL
PMV BLD AUTO: 10.5 FL (ref 9.2–12.9)
POTASSIUM SERPL-SCNC: 3.7 MMOL/L (ref 3.5–5.1)
PROT SERPL-MCNC: 5.1 GM/DL (ref 6–8.4)
RBC # BLD AUTO: 3.43 M/UL (ref 4.6–6.2)
RELATIVE EOSINOPHIL (OHS): 0.5 %
RELATIVE LYMPHOCYTE (OHS): 1.7 % (ref 18–48)
RELATIVE MONOCYTE (OHS): 4.9 % (ref 4–15)
RELATIVE NEUTROPHIL (OHS): 88.6 % (ref 38–73)
SODIUM SERPL-SCNC: 139 MMOL/L (ref 136–145)
WBC # BLD AUTO: 12.53 K/UL (ref 3.9–12.7)

## 2025-06-25 PROCEDURE — 27000646 HC AEROBIKA DEVICE

## 2025-06-25 PROCEDURE — 99900031 HC PATIENT EDUCATION (STAT)

## 2025-06-25 PROCEDURE — 84100 ASSAY OF PHOSPHORUS: CPT | Performed by: STUDENT IN AN ORGANIZED HEALTH CARE EDUCATION/TRAINING PROGRAM

## 2025-06-25 PROCEDURE — 25000003 PHARM REV CODE 250: Performed by: STUDENT IN AN ORGANIZED HEALTH CARE EDUCATION/TRAINING PROGRAM

## 2025-06-25 PROCEDURE — 83735 ASSAY OF MAGNESIUM: CPT | Performed by: STUDENT IN AN ORGANIZED HEALTH CARE EDUCATION/TRAINING PROGRAM

## 2025-06-25 PROCEDURE — 94664 DEMO&/EVAL PT USE INHALER: CPT

## 2025-06-25 PROCEDURE — 99900035 HC TECH TIME PER 15 MIN (STAT)

## 2025-06-25 PROCEDURE — 27000173 HC ACAPELLA DEVICE DH OR DM

## 2025-06-25 PROCEDURE — 25000003 PHARM REV CODE 250: Performed by: NURSE PRACTITIONER

## 2025-06-25 PROCEDURE — 94640 AIRWAY INHALATION TREATMENT: CPT

## 2025-06-25 PROCEDURE — 25000242 PHARM REV CODE 250 ALT 637 W/ HCPCS

## 2025-06-25 PROCEDURE — 11000001 HC ACUTE MED/SURG PRIVATE ROOM

## 2025-06-25 PROCEDURE — 85025 COMPLETE CBC W/AUTO DIFF WBC: CPT | Performed by: STUDENT IN AN ORGANIZED HEALTH CARE EDUCATION/TRAINING PROGRAM

## 2025-06-25 PROCEDURE — 97530 THERAPEUTIC ACTIVITIES: CPT | Mod: CO

## 2025-06-25 PROCEDURE — 25000003 PHARM REV CODE 250

## 2025-06-25 PROCEDURE — 36415 COLL VENOUS BLD VENIPUNCTURE: CPT | Performed by: STUDENT IN AN ORGANIZED HEALTH CARE EDUCATION/TRAINING PROGRAM

## 2025-06-25 PROCEDURE — 25000003 PHARM REV CODE 250: Performed by: HOSPITALIST

## 2025-06-25 PROCEDURE — 25000242 PHARM REV CODE 250 ALT 637 W/ HCPCS: Performed by: FAMILY MEDICINE

## 2025-06-25 PROCEDURE — 97110 THERAPEUTIC EXERCISES: CPT | Mod: CQ

## 2025-06-25 PROCEDURE — 99233 SBSQ HOSP IP/OBS HIGH 50: CPT | Mod: GC,,, | Performed by: INTERNAL MEDICINE

## 2025-06-25 PROCEDURE — 94761 N-INVAS EAR/PLS OXIMETRY MLT: CPT

## 2025-06-25 PROCEDURE — 80053 COMPREHEN METABOLIC PANEL: CPT | Performed by: STUDENT IN AN ORGANIZED HEALTH CARE EDUCATION/TRAINING PROGRAM

## 2025-06-25 PROCEDURE — 97530 THERAPEUTIC ACTIVITIES: CPT | Mod: CQ

## 2025-06-25 PROCEDURE — 25000242 PHARM REV CODE 250 ALT 637 W/ HCPCS: Performed by: STUDENT IN AN ORGANIZED HEALTH CARE EDUCATION/TRAINING PROGRAM

## 2025-06-25 PROCEDURE — 27000221 HC OXYGEN, UP TO 24 HOURS

## 2025-06-25 RX ORDER — BUDESONIDE 0.5 MG/2ML
0.5 INHALANT ORAL EVERY 12 HOURS
Status: DISCONTINUED | OUTPATIENT
Start: 2025-06-25 | End: 2025-07-07

## 2025-06-25 RX ADMIN — ATORVASTATIN CALCIUM 80 MG: 80 TABLET, FILM COATED ORAL at 08:06

## 2025-06-25 RX ADMIN — AMIODARONE HYDROCHLORIDE 400 MG: 100 TABLET ORAL at 08:06

## 2025-06-25 RX ADMIN — BENZONATATE 100 MG: 100 CAPSULE ORAL at 08:06

## 2025-06-25 RX ADMIN — APIXABAN 5 MG: 5 TABLET, FILM COATED ORAL at 08:06

## 2025-06-25 RX ADMIN — MELATONIN TAB 3 MG 6 MG: 3 TAB at 09:06

## 2025-06-25 RX ADMIN — TIOTROPIUM BROMIDE INHALATION SPRAY 2 PUFF: 3.12 SPRAY, METERED RESPIRATORY (INHALATION) at 08:06

## 2025-06-25 RX ADMIN — Medication: at 08:06

## 2025-06-25 RX ADMIN — AMITRIPTYLINE HYDROCHLORIDE 25 MG: 25 TABLET, FILM COATED ORAL at 09:06

## 2025-06-25 RX ADMIN — PANTOPRAZOLE SODIUM 40 MG: 40 TABLET, DELAYED RELEASE ORAL at 08:06

## 2025-06-25 RX ADMIN — HYDROXYZINE HYDROCHLORIDE 25 MG: 25 TABLET, FILM COATED ORAL at 09:06

## 2025-06-25 RX ADMIN — LEVALBUTEROL HYDROCHLORIDE 1.25 MG: 0.63 SOLUTION RESPIRATORY (INHALATION) at 07:06

## 2025-06-25 RX ADMIN — LEVETIRACETAM 500 MG: 500 TABLET, FILM COATED ORAL at 08:06

## 2025-06-25 RX ADMIN — HYDROXYZINE HYDROCHLORIDE 25 MG: 25 TABLET, FILM COATED ORAL at 08:06

## 2025-06-25 RX ADMIN — LEVALBUTEROL HYDROCHLORIDE 1.25 MG: 0.63 SOLUTION RESPIRATORY (INHALATION) at 08:06

## 2025-06-25 RX ADMIN — ROFLUMILAST 500 MCG: 500 TABLET ORAL at 08:06

## 2025-06-25 RX ADMIN — GUAIFENESIN 1200 MG: 600 TABLET, MULTILAYER, EXTENDED RELEASE ORAL at 08:06

## 2025-06-25 RX ADMIN — OXYCODONE HYDROCHLORIDE AND ACETAMINOPHEN 1 TABLET: 5; 325 TABLET ORAL at 09:06

## 2025-06-25 RX ADMIN — APIXABAN 5 MG: 5 TABLET, FILM COATED ORAL at 09:06

## 2025-06-25 RX ADMIN — BUDESONIDE 0.5 MG: 0.5 SUSPENSION RESPIRATORY (INHALATION) at 08:06

## 2025-06-25 RX ADMIN — AMIODARONE HYDROCHLORIDE 400 MG: 100 TABLET ORAL at 09:06

## 2025-06-25 RX ADMIN — FLUTICASONE PROPIONATE 100 MCG: 50 SPRAY, METERED NASAL at 08:06

## 2025-06-25 RX ADMIN — LEVALBUTEROL HYDROCHLORIDE 1.25 MG: 0.63 SOLUTION RESPIRATORY (INHALATION) at 01:06

## 2025-06-25 RX ADMIN — LEVOFLOXACIN 750 MG: 750 TABLET, FILM COATED ORAL at 12:06

## 2025-06-25 RX ADMIN — ASPIRIN 81 MG: 81 TABLET, COATED ORAL at 08:06

## 2025-06-25 RX ADMIN — ONDANSETRON 4 MG: 4 TABLET, ORALLY DISINTEGRATING ORAL at 08:06

## 2025-06-25 RX ADMIN — Medication 4 ML: at 07:06

## 2025-06-25 RX ADMIN — Medication 4 ML: at 08:06

## 2025-06-25 RX ADMIN — GUAIFENESIN 1200 MG: 600 TABLET, MULTILAYER, EXTENDED RELEASE ORAL at 09:06

## 2025-06-25 RX ADMIN — MIRTAZAPINE 15 MG: 15 TABLET, FILM COATED ORAL at 09:06

## 2025-06-25 RX ADMIN — LEVETIRACETAM 500 MG: 500 TABLET, FILM COATED ORAL at 09:06

## 2025-06-25 NOTE — PLAN OF CARE
Problem: Breathing Pattern Ineffective  Goal: Effective Breathing Pattern  Intervention: Promote Improved Breathing Pattern  Flowsheets (Taken 6/25/2025 1658)  Airway/Ventilation Management:   airway patency maintained   humidification applied   pulmonary hygiene promoted   calming measures promoted  Breathing Techniques/Airway Clearance: deep/controlled cough encouraged  Head of Bed (HOB) Positioning: HOB elevated     Problem: Airway Clearance Ineffective  Goal: Effective Airway Clearance  Intervention: Promote Airway Secretion Clearance  Flowsheets (Taken 6/25/2025 1658)  Breathing Techniques/Airway Clearance: deep/controlled cough encouraged  Cough And Deep Breathing: done independently per patient

## 2025-06-25 NOTE — PLAN OF CARE
"Patient had a good day; no complaints of pain or distress at this time. Patient slept on and off today. His appetite is very poor, patient states "Im just not hungry".   Daughter is at bedside this evening. Bed in lowest position, call light within reach.    Problem: Adult Inpatient Plan of Care  Goal: Plan of Care Review  Outcome: Progressing  Goal: Patient-Specific Goal (Individualized)  Outcome: Progressing     "

## 2025-06-25 NOTE — PROGRESS NOTES
[facility]  Wound Care Progress Note  Attending Physician: Bernardino Guthrie MD  Primary Care Physician: Sierra Landry MD  Date of Admit: 6/3/2025      Chief Complaint    Wound to left leg   History of Present Illness:     Per attending   HPI: Jordin Allen Jr. Is a 79 y.o.M with pmhx of COPD on chronic 4L NC, GERD, HTN, SHOLA, CAD, NAFLD, dyslipidemia, L lung cancer s/p chemo and radiation c/b radiation necrosis, off immunotherapy since 12/24 metastasis to brain s/p XRT, anxiety, who presents to [facility] ED from LTAC with acute onset need for increased oxygen associated with chest pain beginning last night. Patient has not felt this chest pain since the episode. Currently no chest pain. Does endorse SOB and cough. Reports he is not coughing up anything because he feels like it is stuck in his chest. Denies fever, chills, chest pain, palpitations,  abdominal pain, n/v/d, dysuria, headaches, or any other symptoms at this time.      In ED: AF, VSS on 6-8L HFNC. CBC with leukocytosis. Hgb 11.9. CMP notable for Na 135, mild elevation in ALT. Phos 2.4. BNP 62. HS trop 12. LA 0.9. covid/flu negative. ABG with pH 7.499, pCO2 56.3, pO2 79, HCO3 43.8. Blood cultures pending. CTA chest with no evidence of acute PE, mild ill-defined airspace opacities in the dependent portions of the right upper and middle lobes when compared to 05/22/2025, nonspecific.  This could be due to atelectasis, aspiration and/or pneumonia 5 mm left upper lobe nodule, unchanged from 05/22/2025 but new when compared to 03/05/2025.  A follow-up chest CT in 1 year could be considered if the patient is at increased risk of developing lung cancer, such as from a smoking history. S/p duoneb, dexamethasone 10mg inj, vanc and zosyn in ED. Admitted to  for further evaluation.     6/4/2025 Patient seen for wound to left leg  Patient seen lying in bed. No acute distress. Wound care done with nurse. No issues or complaints at  time. Cont POC Plan to f/u on 6/5 6/6/2025 Patient seen lying in bed. No acute distress. Wound care done with nurse. No issues or complaints at time. Cont POC Plan to f/u on 6/9 6/12/2025 Patient seen lying in bed. Patient returned from acute stay. No acute distress. Wound care done with nurse. No issues or complaints at time. Cont POC Plan to f/u on 6/13 6/13/2925 Patient seen lying in bed. No acute distress. Wound care done with nurse. No issues or complaints at time. Cont POC Plan to f/u on  6/16 6/16/2025 Patient seen lying in bed. No acute distress. Wound care done with nurse. No issues or complaints at time. Cont POC Plan to f/u on  6/17 6/17/2025 Patient seen lying in bed. No acute distress. Wound care done with nurse. No issues or complaints at time. Cont POC     6/23/2025 Patient seen lying in bed. No acute distress. Wound care done with nurse. No issues or complaints at time. Cont POC Plan to f/u on 6/24    Past medical history:     Past Medical History:   Diagnosis Date    Benign neoplasm of colon 10/01/2013    Bronchitis chronic     CAD (coronary artery disease) 10/31/2013    Cataract 2008    COPD (chronic obstructive pulmonary disease)     Emphysema of lung     Encounter for preventive health examination 03/21/2018    GERD (gastroesophageal reflux disease)     Glaucoma 2010    Hearing loss 2015    Heart attack 2013    Hx of colonic polyps     Hypertension     NAFLD (nonalcoholic fatty liver disease) 03/27/2017    SHOLA on CPAP     RLS (restless legs syndrome)     Screen for colon cancer 08/30/2013     Past medical history was reviewed and was otherwise negative except as above.    Past surgical history:     Past Surgical History:   Procedure Laterality Date    bilateral foot surgery  1993    CARDIAC CATHETERIZATION  2013    x1    CATARACT EXTRACTION, BILATERAL      COLON SURGERY  2013    Ace Mott MD    COLONOSCOPY N/A 03/21/2018    Procedure: COLONOSCOPY;  Surgeon: Terry Mott MD;   Location: [facility] ENDO (4TH FLR);  Service: Endoscopy;  Laterality: N/A;    COLONOSCOPY N/A 04/12/2019    Procedure: COLONOSCOPY;  Surgeon: John Mantilla MD;  Location: [facility] ENDO (4TH FLR);  Service: Endoscopy;  Laterality: N/A;    COLONOSCOPY N/A 05/31/2022    Procedure: COLONOSCOPY;  Surgeon: MEDINA Farris MD;  Location: [facility] ENDO (4TH FLR);  Service: Endoscopy;  Laterality: N/A;  fully vacc-inst portal-tb    ENDOBRONCHIAL ULTRASOUND Bilateral 04/30/2024    Procedure: ENDOBRONCHIAL ULTRASOUND (EBUS);  Surgeon: Naveed Aguero MD;  [facility];  Service: Pulmonary;  Laterality: Bilateral;    EYE SURGERY  2011    scrotal abscess removal      TYMPANOSTOMY TUBE PLACEMENT  2017     Past surgical history was reviewed and was noncontributory except as above.    Allergies:     Review of patient's allergies indicates:   Allergen Reactions    Brilinta [ticagrelor] Itching    Lisinopril      Other reaction(s): cough    Metoprolol succinate Rash     Allergies were reviewed and were negative except as above.    Home Medications:     Prior to Admission medications    Medication Sig Start Date End Date Taking? Authorizing Provider   acetaminophen (TYLENOL) 500 MG tablet Take 500 mg by mouth 2 (two) times daily as needed for Pain.    Provider, Historical   albuterol (ACCUNEB) 0.63 mg/3 mL Nebu Inhale 3 mLs (0.63 mg total) by nebulization every 6 (six) hours as needed (if symptoms failed to improve with inhaler). Rescue 12/10/24   Bienvenido Ward MD   albuterol (PROVENTIL/VENTOLIN HFA) 90 mcg/actuation inhaler Inhale 2 puffs into the lungs every 4 (four) hours as needed for Wheezing. 12/10/24   Bienvenido Ward MD   amitriptyline (ELAVIL) 25 MG tablet Take 1 tablet (25 mg total) by mouth once daily.  Patient taking differently: Take 25 mg by mouth every evening. 9/3/24   Sierra Landry MD   aspirin (ECOTRIN) 81 MG EC tablet Take 81 mg by mouth every morning.    Provider, Historical   atorvastatin  (LIPITOR) 80 MG tablet Take 1 tablet (80 mg total) by mouth in the morning. 4/21/25   Sierra Landry MD   BETA-CAROTENE,A, W-C & E/MIN (OCUVITE ORAL) Take 1 capsule by mouth once daily.     Provider, Historical   budesonide-glycopyr-formoterol (BREZTRI AEROSPHERE) 160-9-4.8 mcg/actuation HFAA Inhale 2 puffs into the lungs 2 (two) times a day. 12/10/24   Bienvenido Ward MD   dexAMETHasone (DECADRON) 4 MG Tab Take 1 tablet (4 mg total) by mouth 2 (two) times daily.  Patient not taking: Reported on 5/16/2025 3/26/25   Bienvenido Henson MD   echinacea 400 mg Cap Take 1 capsule by mouth once daily.     Provider, Historical   fluticasone propionate (FLONASE) 50 mcg/actuation nasal spray Spray 2 sprays (100 mcg total) in Each Nostril once daily. 10/23/24   Caren Shah MD   furosemide (LASIX) 20 MG tablet Take 1 tablet (20 mg total) by mouth once daily. 4/15/25 4/15/26  Bienvenido Ward MD   ibuprofen (ADVIL,MOTRIN) 200 MG tablet Take 200 mg by mouth every 8 (eight) hours as needed for Pain. 2/21/25   Provider, Historical   latanoprost 0.005 % ophthalmic solution Instill 1 drop into both eyes every night at bedtime 1/30/25      levETIRAcetam (KEPPRA) 500 MG Tab Take 1 tablet (500 mg total) by mouth 2 (two) times daily. 3/24/25 3/24/26  Janene Nelson PA-C   losartan (COZAAR) 100 MG tablet Take 1 tablet (100 mg total) by mouth once daily. 12/19/24   Prince Ba MD   nitroGLYCERIN (NITROSTAT) 0.4 MG SL tablet Place 1 tablet (0.4 mg total) under the tongue every 5 (five) minutes as needed. 5/6/24   Prince Ba MD   omeprazole (PRILOSEC) 20 MG capsule Take 20 mg by mouth once daily.    Provider, Historical   polyethylene glycol (GLYCOLAX) 17 gram/dose powder Take 17 grams by mouth every day for constipation prevention 5/20/25      roflumilast (DALIRESP) 500 mcg Tab Take 500 mcg by mouth every morning. 2/22/25   Provider, Historical   sennosides (SENNA ORAL) Take 1 tablet by mouth daily as  "needed (Constipation).    Provider, Historical   traZODone (DESYREL) 50 MG tablet Take 1 tablet (50 mg total) by mouth every evening. 25   Lou Muro NP       Family History:     Family History   Problem Relation Name Age of Onset    Heart disease Mother jc deutsch     Heart attack Mother jc deutsch     Hypertension Mother jc deutsch     No Known Problems Sister      No Known Problems Daughter 2     Diabetes Maternal Grandmother imtiaz jennings     Asthma Neg Hx      Emphysema Neg Hx      Prostate cancer Neg Hx      Cirrhosis Neg Hx       Family history was reviewed and was otherwise negative except as above.    Social History:   Social History[1]  Social history was reviewed and was otherwise negative except as above    Review of Systems   A 10 point review of systems was conducted and was negative except as described in the HPI.  Patient denies Chest Pain.  Patient denies shortness of breath.  Patient denies fevers.  Patient denies chills.    Physical Examination:   Triage: BP: 123/77  Pulse: 101  Temp: 97.6 °F (36.4 °C)  Resp: 18  Height: 5' 9" (175.3 cm)  Weight: 86.8 kg (191 lb 5.8 oz) (witness by SALO Escalante and C.C. RT.)  BMI (Calculated): 28.2  SpO2: 97 %  Exam: /78   Pulse 109   Temp 98.2 °F (36.8 °C) (Axillary)   Resp 20   Ht 5' 9" (1.753 m)   Wt 88.4 kg (194 lb 14.2 oz)   SpO2 (!) 93%   BMI 28.78 kg/m²     Vitals  BP  Min: 108/58  Max: 133/82  Temp  Av.6 °F (36.4 °C)  Min: 96.5 °F (35.8 °C)  Max: 98.2 °F (36.8 °C)  Pulse  Av.6  Min: 78  Max: 117  Resp  Av.2  Min: 16  Max: 34  SpO2  Av.9 %  Min: 87 %  Max: 99 %  Weight  Av.4 kg (194 lb 14.2 oz)  Min: 88.4 kg (194 lb 14.2 oz)  Max: 88.4 kg (194 lb 14.2 oz)    General Pt alert and oriented, not in apparent distress, good nutrition  Eyes: No conjunctival erythema; normal appearing lids  HEENT: Normocephalic atraumatic, mucous membranes moist  Cardiovascular: No edema  Respiratory: Non-labored, no " accessory muscle use, no wheezing  Abdomen: Soft, non tender, non distended, no rebound  Musculoskeletal: no clubbing or cyanosis of digits  Neuro: Grossly intact, weakness upper/lower extremities  Psych: Good mood, full affect, good insight  Skin:  06/23/25 1300         Wound 06/09/25 1313 Other (comment) Nose   Date First Assessed/Time First Assessed: 06/09/25 1313   Present on Original Admission: Yes  Primary Wound Type: (c) Other (comment)  Location: Nose   Wound Image    Dressing Appearance Open to air   Drainage Amount None   Drainage Characteristics/Odor No odor   Appearance Pink   Red (%), Wound Tissue Color 100 %   Periwound Area Dry   Wound Length (cm) 0 cm   Wound Width (cm) 0 cm   Wound Depth (cm) 0 cm   Wound Volume (cm^3) 0 cm^3   Wound Surface Area (cm^2) 0 cm^2   Dressing    (open to air)     Wound 06/18/25 1502 Rash Perineum   Date First Assessed/Time First Assessed: 06/18/25 1502   Present on Original Admission: Yes  Primary Wound Type: Rash  Location: Perineum   Wound Image                Distribution    (open to air, no rash)     Laboratory:  Most Recent Data:  CBC:   Lab Results   Component Value Date    WBC 12.53 06/25/2025    HGB 9.9 (L) 06/25/2025    HCT 30.7 (L) 06/25/2025     (L) 06/25/2025    MCV 90 06/25/2025    RDW 17.7 (H) 06/25/2025     BMP:   Lab Results   Component Value Date     06/25/2025    K 3.7 06/25/2025     06/25/2025    CO2 29 06/25/2025    BUN 29 (H) 06/25/2025    CREATININE 0.8 06/25/2025     (H) 06/25/2025    CALCIUM 8.6 (L) 06/25/2025    MG 2.0 06/25/2025    PHOS 2.7 06/25/2025     LFTs:   Lab Results   Component Value Date    PROT 5.1 (L) 06/25/2025    ALBUMIN 2.0 (L) 06/25/2025    BILITOT 0.6 06/25/2025    AST 20 06/25/2025    ALKPHOS 95 06/25/2025    ALT 55 (H) 06/25/2025    GGT 80 (H) 06/22/2025     Coags:   Lab Results   Component Value Date    INR 1.0 02/07/2025     FLP:   Lab Results   Component Value Date    CHOL 101 (L) 02/07/2025    HDL  "35 (L) 02/07/2025    LDLCALC 53.2 (L) 02/07/2025    TRIG 64 02/07/2025    CHOLHDL 34.7 02/07/2025     DM:   Lab Results   Component Value Date    HGBA1C 5.7 (H) 03/06/2024    HGBA1C 5.6 10/12/2023    HGBA1C 5.8 05/30/2013    GLUF 103 05/19/2014    LDLCALC 53.2 (L) 02/07/2025    CREATININE 0.8 06/25/2025     Thyroid:   Lab Results   Component Value Date    TSH 0.261 (L) 06/01/2025    FREET4 0.99 06/01/2025    H0YVYBI 9.1 03/06/2024    M8BDSUG 86 03/07/2017     Anemia:   Lab Results   Component Value Date    IRON 21 (L) 06/18/2025    TIBC 157 (L) 06/18/2025    FERRITIN 2,096.0 (H) 06/18/2025    SASYSQOL75 474 08/29/2024    FOLATE 4.5 08/29/2024     Cardiac:   Lab Results   Component Value Date    TROPONINI 10.800 (H) 06/08/2025    BNP 63 06/18/2025     Urinalysis:   Lab Results   Component Value Date    COLORU Yellow 06/09/2025    SPECGRAV 1.010 06/09/2025    NITRITE Negative 06/09/2025    KETONESU Trace (A) 02/07/2025    UROBILINOGEN Negative 06/09/2025    WBCUA 4 06/09/2025       Trended Lab Data:  Recent Labs   Lab 06/22/25  0759 06/23/25  0351 06/23/25  0520 06/25/25  0415   WBC 11.22 11.91  --  12.53   HGB 10.3* 10.2*  --  9.9*    206  --  133*   MCV 91 91  --  90   RDW 17.2* 17.4*  --  17.7*     --  143 139   K 4.5  --  4.6 3.7     --  107 103   CO2 27  --  27 29   BUN 35*  --  35* 29*   GLU 90  --  159* 134*   CALCIUM 9.7  --  9.2 8.6*   PROT 5.6*  --  5.3* 5.1*   ALBUMIN 2.3*  --  2.1* 2.0*   BILITOT 0.5  --  0.6 0.6   AST 48*  --  42 20   ALKPHOS 112  --  120 95   ALT 95*  --  89* 55*       Trended Cardiac Data:  No results for input(s): "TROPONINI", "CKTOTAL", "CKMB", "BNP" in the last 168 hours.      Micro Results  No components found for: "CBLOOD"  No components found for: "CURINE"  No components found for: "CRESPWSM"    Radiology:  US Lower Extremity Veins Bilateral  Result Date: 6/18/2025  EXAMINATION: US LOWER EXTREMITY VEINS BILATERAL CLINICAL HISTORY: r/o dvt; TECHNIQUE: Duplex and " color flow Doppler and dynamic compression was performed of the bilateral lower extremity veins was performed. COMPARISON: 05/22/2025 FINDINGS: Duplex and color flow Doppler evaluation does not reveal any evidence of acute venous thrombosis in the common femoral, superficial femoral, greater saphenous, popliteal, peroneal, anterior tibial and posterior tibial veins bilaterally.  There is no reflux to suggest valvular incompetence.  Please note, there is scattered slow flow throughout the bilateral lower extremities.     No evidence of deep venous thrombosis in either lower extremity. Electronically signed by: Artur Velazquez MD Date:    06/18/2025 Time:    16:02    CT Chest Without Contrast  Result Date: 6/18/2025  EXAMINATION: CT CHEST WITHOUT CONTRAST CLINICAL HISTORY: Pneumonia, unresolved; TECHNIQUE: Using low dose technique the chest was surveyed from above the pulmonary apices through the posterior costophrenic angles.  Data was reconstructed for multiplanar images in axial, sagittal and coronal planes and for maximal intensity projection images in the the axial, sagittal and coronal planes. COMPARISON: Multiple priors, most recent chest x-ray 06/18/2025 and CTA 06/10/2025 FINDINGS: Support tubes and lines: None. Aorta: Mild ectasia of the ascending aorta measuring 4.1 cm, similar to prior.  Mild atherosclerosis.  Aberrant right subclavian artery. Heart: Normal size. Coronary arteries: Multi-vessel calcific atherosclerosis of the coronary arteries. Pericardium: Trace pericardial effusion. Central pulmonary arteries: Normal caliber. Base of neck/thyroid: Unremarkable. Lymph nodes: No supraclavicular, axillary, internal mammary, mediastinal, or hilar adenopathy. Esophagus: Unremarkable. Pleura: Bilateral small pleural effusions with adjacent compressive atelectasis, new from prior. Upper abdomen: Fatty atrophy of the pancreas, unchanged.  Accessory splenules. Body wall: Unremarkable. Airways: Unremarkable.  Lungs: Paraseptal and centrilobular emphysema.  Right and left ground-glass opacities, mostly affecting the lower lobes, new from prior.  Treatment changes of the left hilar region with associated volume loss and architectural distortion.  Stable 4 mm nodule in the left upper lobe. Bones: Degenerative changes of the spine.  Unchanged sclerotic focus in the posterior elements of T9.  Height loss of the superior endplate of L1, unchanged.     Interval development of bilateral small pleural effusions. Right and left ground-glass opacities, new from prior, concerning for progression of infectious process. Additional findings as above. Electronically signed by resident: Madeline Iniguez Date:    06/18/2025 Time:    10:07 Electronically signed by: Geovanny Dennis Date:    06/18/2025 Time:    11:16    X-Ray Chest AP Portable  Result Date: 6/18/2025  EXAMINATION: XR CHEST AP PORTABLE CLINICAL HISTORY: Chest Pain; TECHNIQUE: Single frontal view of the chest was performed. COMPARISON: 2025 FINDINGS: Similar perihilar and lower lobe infiltrates within both lungs.  Findings suggestive of pneumonia versus pulmonary edema depending upon clinical scenario.  Stable appearance of the cardiomediastinal contours.  Probable small bilateral pleural effusions.     See above Electronically signed by: Solitario Rivas Date:    06/18/2025 Time:    08:11    X-Ray Chest 1 View  Result Date: 6/17/2025  EXAMINATION: XR CHEST 1 VIEW CLINICAL HISTORY: worsening hypoxia; FINDINGS: Chest one view: Heart size is normal.  There is perihilar and lower lobe edema and/or infiltrate.  Bones bowel gas are noncontributory.  There is emphysema.     Pulmonary edema versus pneumonia. Emphysema. Electronically signed by: Alexander Blankenship MD Date:    06/17/2025 Time:    07:58    Echo  Result Date: 6/11/2025    Left Ventricle: The left ventricle is normal in size. Ventricular mass is normal. Normal wall thickness. There is low normal systolic function with a  visually estimated ejection fraction of 50 - 55%.   Inferolateral pseudodyskinesis   Right Ventricle: Right ventricle was not well visualized due to poor acoustic window. The right ventricle is normal in size Systolic function is mildly reduced.   IVC/SVC: Intermediate venous pressure at 8 mmHg.   RV function not well seen.     CTA Chest Non-Coronary (PE Studies)  Result Date: 6/10/2025  EXAMINATION: CTA CHEST NON CORONARY (PE STUDIES) CLINICAL HISTORY: r/o PE, signifcant tachycardia; TECHNIQUE: Low dose axial images, sagittal and coronal reformations were obtained from the thoracic inlet to the lung bases following the IV administration of 100 mL of Omnipaque 350.  Contrast timing was optimized to evaluate the pulmonary arteries.  MIP images were performed. COMPARISON: None FINDINGS: Pulmonary arteries:Pulmonary arteries are adequately enhanced.No filling defects to indicate pulmonary embolism. Thoracic soft tissues: Unremarkable. Aorta: Mild aneurysmal dilation of the ascending thoracic aorta to approximately 4.2 cm axial 256 of series 2. Heart: Normal size. No effusion. Nathalie/Mediastinum: No pathologic carolyn enlargement. Airways: Patent. Lungs/Pleura: Prominent emphysematous changes throughout the lungs. Mild interstitial infiltrate or scarring posterior right upper lobe. Mild left basilar atelectasis. Minimal left upper lobe ground-glass infiltrate. Esophagus: Unremarkable. Upper Abdomen: No abnormality of the partially imaged upper abdomen. Bones: Moderate chronic compression fracture superior endplate of L1. Aberrant right subclavian artery noted as an anatomic variant.     1. No evidence of pulmonary embolism. 2. Mild aneurysm dilation of the ascending thoracic aorta to approximately 4.2 cm.  Recommend surveillance. 3. Emphysematous changes of the lungs. 4. Mild interstitial infiltrate or scarring in the posterior right upper lobe and minimal left upper lobe infiltrate.  Minimal pneumonitis is not excluded. 5.  Aberrant right subclavian artery noted as an anatomic variant. 6. Moderate chronic compression fracture of the superior endplate of L1 Electronically signed by: Ryan Jackson Date:    06/10/2025 Time:    21:38    CTA Chest Non-Coronary (PE Studies)  Result Date: 6/9/2025  EXAMINATION: CTA CHEST NON CORONARY (PE STUDIES) CLINICAL HISTORY: Pulmonary embolism (PE) suspected, high prob; TECHNIQUE: During intravenous bolus injection of 100 ml of Omnipaque 350 contrast medium and using low dose technique, the chest was surveyed from above the pulmonary apices through the posterior costophrenic angles data was reconstructed for multiplanar images in axial, sagittal and coronal planes and for maximal intensity projection images in the the axial, sagittal and coronal planes. COMPARISON: Multiple priors, most recent CTA 05/22/2025 FINDINGS: Exam quality: Satisfactory. Pulmonary embolism: No acute or chronic pulmonary embolism. Central pulmonary arteries: Normal caliber. Aorta: Normal caliber.  Mild atherosclerosis.  Aberrant right subclavian artery. Heart: No right heart strain. Normal size. Coronary arteries: Multi-vessel calcific atherosclerosis of the coronary arteries. Pericardium: Normal. No effusion, thickening, or calcification. Support tubes and lines: None. Base of neck/thyroid: Normal. Lymph nodes: No supraclavicular, axillary, internal mammary, mediastinal, or hilar adenopathy. Esophagus: Normal. Pleura: No effusion, thickening, or calcification. Upper abdomen: Accessory splenule.  Fatty atrophy of the pancreas. Body wall: Unremarkable Airways: Normal. Lungs: Centrilobular and paraseptal emphysema.  Treatment changes of the left hilar region with associated volume loss and architectural distortion, stable when compared to prior imaging.  Right bandlike scarring versus atelectasis.  Left lower lobe calcified granuloma.  Ill-defined airspace opacities present in the dependent portions of the right upper and middle  lobes are new when compared to 05/22/2025.  A 4 mm nodule in the left upper lobe (series 3, image 257) is unchanged from 05/22/2025 but new when compared to 03/05/2025. Bones: Degenerative changes of the spine.  Height loss of the superioror endplate of L1, unchanged from 02/20/2025.  Unchanged sclerotic focus in the posterior elements of T9.     No evidence of acute pulmonary embolism. New mild ill-defined airspace opacities in the dependent portions of the right upper and middle lobes when compared to 05/22/2025, nonspecific.  This could be due to atelectasis, aspiration and/or pneumonia. 5 mm left upper lobe nodule, unchanged from 05/22/2025 but new when compared to 03/05/2025.  A follow-up chest CT in 1 year could be considered if the patient is at increased risk of developing lung cancer, such as from a smoking history. Additional findings as above. Electronically signed by resident: Madeline Iniguez Date:    06/09/2025 Time:    10:55 Electronically signed by: Anna Brown Date:    06/09/2025 Time:    11:55    X-Ray Chest AP Portable  Result Date: 6/9/2025  EXAMINATION: XR CHEST AP PORTABLE CLINICAL HISTORY: sob; TECHNIQUE: Single frontal view of the chest was performed. COMPARISON: 06/01/25 FINDINGS: Cardiac size is normal.  Lungs are clear with no significant infiltrate or vascular congestion.  No pleural fluid is seen.     See above Electronically signed by: Prince Ovalle MD Date:    06/09/2025 Time:    09:29    X-Ray Chest AP Portable  Result Date: 6/1/2025  EXAMINATION: XR CHEST AP PORTABLE CLINICAL HISTORY: palpitations; TECHNIQUE: Single frontal view of the chest was performed. COMPARISON: 05/26/2025. FINDINGS: The lungs are well expanded and clear. No focal opacities are seen. The pleural spaces are clear. The cardiac silhouette is unremarkable. The visualized osseous structures are unremarkable.     No acute abnormality. Electronically signed by: Popeye Mckeon Date:    06/01/2025  Time:    22:33      Recent Results (from the past 24 hours)   Magnesium    Collection Time: 06/25/25  4:15 AM   Result Value Ref Range    Magnesium  2.0 1.6 - 2.6 mg/dL   Comprehensive Metabolic Panel    Collection Time: 06/25/25  4:15 AM   Result Value Ref Range    Sodium 139 136 - 145 mmol/L    Potassium 3.7 3.5 - 5.1 mmol/L    Chloride 103 95 - 110 mmol/L    CO2 29 23 - 29 mmol/L    Glucose 134 (H) 70 - 110 mg/dL    BUN 29 (H) 8 - 23 mg/dL    Creatinine 0.8 0.5 - 1.4 mg/dL    Calcium 8.6 (L) 8.7 - 10.5 mg/dL    Protein Total 5.1 (L) 6.0 - 8.4 gm/dL    Albumin 2.0 (L) 3.5 - 5.2 g/dL    Bilirubin Total 0.6 0.1 - 1.0 mg/dL    ALP 95 40 - 150 unit/L    AST 20 11 - 45 unit/L    ALT 55 (H) 10 - 44 unit/L    Anion Gap 7 (L) 8 - 16 mmol/L    eGFR >60 >60 mL/min/1.73/m2   Phosphorus    Collection Time: 06/25/25  4:15 AM   Result Value Ref Range    Phosphorus Level 2.7 2.7 - 4.5 mg/dL   CBC with Differential    Collection Time: 06/25/25  4:15 AM   Result Value Ref Range    WBC 12.53 3.90 - 12.70 K/uL    RBC 3.43 (L) 4.60 - 6.20 M/uL    HGB 9.9 (L) 14.0 - 18.0 gm/dL    HCT 30.7 (L) 40.0 - 54.0 %    MCV 90 82 - 98 fL    MCH 28.9 27.0 - 31.0 pg    MCHC 32.2 32.0 - 36.0 g/dL    RDW 17.7 (H) 11.5 - 14.5 %    Platelet Count 133 (L) 150 - 450 K/uL    MPV 10.5 9.2 - 12.9 fL    Nucleated RBC 0 <=0 /100 WBC    Neut % 88.6 (H) 38 - 73 %    Lymph % 1.7 (L) 18 - 48 %    Mono % 4.9 4 - 15 %    Eos % 0.5 <=8 %    Basophil % 0.1 <=1.9 %    Imm Grans % 4.2 (H) 0.0 - 0.5 %    Neut # 11.10 (H) 1.8 - 7.7 K/uL    Lymph # 0.21 (L) 1 - 4.8 K/uL    Mono # 0.62 0.3 - 1 K/uL    Eos # 0.06 <=0.5 K/uL    Baso # 0.01 <=0.2 K/uL    Imm Grans # 0.53 (H) 0.00 - 0.04 K/uL   Platelet Review    Collection Time: 06/25/25  4:15 AM   Result Value Ref Range    Platelet Estimate Decreased (A)       A1C   Lab Results   Component Value Date    HGBA1C 5.7 (H) 03/06/2024         Assessment/Plan:       MASD to Sacrum and Buttocks   Wound care clean with bath wipes  apply Triad daily  Turn patient every 2 hours      Cellulitis to Left lower leg -resolved  Dermatitis to left foot -resolved   -wash with bath wipes apply lac-hydrin daily per bedside nurse     Rash to:  RIGHT & LEFT GROIN-resolved   RIGHT & LEFT ABDOMEN-resolved   -clean with bath wipes apply antifungal powder daily per bedside nurse     Pressure wound stage I to right top ankle -resolved  -leave JOSSUE monitor     Decreased Mobility   -Patient with decreased bed mobility therefore patient is at high risk for developing wounds and deterioration of any current wounds. Patient needs frequent turning.     Nutrition :  Promote good nutrition to for improved wound healing.      Off-loading:   Follow facility bed policy for proper bed surface   Offload heels per facility protocol   Turn every 2 hours   Use Wheelchair Cushion     Patient Treatment Goals:  Short Term Goals  The wound base will be 25% .,demonstrate 25% more granulation tissue.  Short Term Goals  Patient wound status will improve/maintain without exacerbation infection of deterioration.  Wound Healing  Patient will have a decrease in wound size by 20% in 4 weeks.  Clinical Goals  Prevention of Infection is important for wound healing  Functional Goals:  The patient will achieve proper turning schedule.    -cont medical management     High Risk Because  -Chronic illness with SEVERE exacerbation, progression, or side effects of treatment.  -Acute or chronic illness or injury that poses a threat to life or bodily function    Notify Sheree Tao NP, or wound care RN if any new issues arise or if there is deterioration in documented wounds.    Sheree Tao FNP-C                       [1]   Social History  Tobacco Use    Smoking status: Former     Current packs/day: 0.00     Average packs/day: 1 pack/day for 40.0 years (40.0 ttl pk-yrs)     Types: Cigarettes     Start date: 8/15/1972     Quit date: 8/15/2012     Years since quittin.8     Passive  exposure: Never    Smokeless tobacco: Never   Substance Use Topics    Alcohol use: Yes     Alcohol/week: 3.0 standard drinks of alcohol     Types: 3 Cans of beer per week     Comment: maybe 2-3  beers weekly    Drug use: No

## 2025-06-25 NOTE — PROGRESS NOTES
[facility] Medicine   Progress Note  Room: 207/207   Patient Name: Jordin Allen Jr.  MRN: [id number]  Admit Date: 6/3/2025   Length of Stay:  LOS: 22 days   Attending Physician: Bernardino Guthrie MD  Nurse Practitioner: Ernestine Palomino NP    Date of Service: 06/25/2025    Principal Problem:    Acute on chronic respiratory failure with hypoxia    Brief HPI:     Jordin Allen Jr. is a 79 y.o. 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  [place] [facility] [place] with complaints of shortness of breath and left foot redness.  Admitted to hospital medicine team for left foot cellulitis and acute hypoxic respiratory failure secondary to pneumonia/COPD exacerbation. CTA chest negative for PE. Emphysematous change with superimposed edema. Nodular focus within the anterior aspect of left upper lobe.  Started on nebs, prednisone, and empiric vanc/cefepime and doxycycline.  Respiratory panel positive for parainfluenza virus.  Course further complicated by sinus tach with PACs/18, for which he was started on diltiazem.  Blood cultures positive for staph, suspected to be contaminant.  Repeat blood cultures returned negative.  He will switch to Augmentin and doxy.  Ongoing episodes of SVT, thought to be due to underlying hypoxia.  He was able to be weaned down to high-flow nasal cannula 15 L.  Discharged to [facility] on 06/03/2025 for rehab, wound care, and O2 weaning.     6/9-6/11-sent out to [facility] for concerns of [place]. Workup revealed findings concerning for pneumonia on CT. Started on empiric vanc and zosyn. Also started on 5 day course of prednisone for COPD exacerbation.    6/18 - 6/22-sent out to [facility] again for increasing O2 requirements and worsening AFib with RVR on diltiazem drip.  Use switch to amiodarone drip at [facility],  and transitioned to p.o. amiodarone successfully.  Infectious workup revealed concerns for aspiration pneumonia.  He was treated with vanc, cefepime, and levofloxacin.  Antibiotics de-escalated to oral levofloxacin.  He was weaned from BiPAP to nasal cannula at 3-5 L.      Interval History    Tolerated CPAP overnight   Stable on 4 L nasal cannula this morning with O2 sats 99%   Labs reviewed and listed below, no critical values  Remains afebrile   Continue levofloxacin, completes course on 06/26        Subjective:     Review of Systems   Constitutional:  Positive for malaise/fatigue.   Respiratory:  Negative for cough, sputum production and shortness of breath.    Cardiovascular:  Negative for chest pain and leg swelling.   Gastrointestinal:  Negative for heartburn, nausea and vomiting.   Genitourinary:  Negative for dysuria and urgency.   Musculoskeletal:  Positive for joint pain (R shoulder). Negative for myalgias.   Neurological:  Positive for weakness. Negative for dizziness.   Psychiatric/Behavioral:  Negative for depression. The patient does not have insomnia.          Objective:     Vital Sign Range  Temp:  [96.5 °F (35.8 °C)-98.2 °F (36.8 °C)]   Pulse:  []   Resp:  [16-34]   BP: (108-142)/(58-95)   SpO2:  [87 %-100 %]     Body mass index is 28.78 kg/m².           Intake/Output Summary (Last 24 hours) at 6/25/2025 0951  Last data filed at 6/25/2025 0648  Gross per 24 hour   Intake 699 ml   Output 750 ml   Net -51 ml       Physical Exam  Constitutional:       Appearance: He is ill-appearing.   HENT:      Head: Normocephalic and atraumatic.      Mouth/Throat:      Mouth: Mucous membranes are moist.      Pharynx: Oropharynx is clear.   Eyes:      Extraocular Movements: Extraocular movements intact.      Pupils: Pupils are equal, round, and reactive to light.   Cardiovascular:      Rate and Rhythm: Normal rate. Rhythm irregular.   Pulmonary:      Breath sounds: Wheezing and rhonchi present.   Abdominal:       General: Bowel sounds are normal.      Palpations: Abdomen is soft.   Musculoskeletal:      Right lower leg: Edema present.      Left lower leg: Edema present.   Skin:     General: Skin is warm and dry.   Neurological:      Mental Status: He is alert and oriented to person, place, and time. Mental status is at baseline.   Psychiatric:         Mood and Affect: Affect is flat.         Wounds       Wound 05/19/25 0233 Moisture associated dermatitis Left dorsal Foot (Active)   05/19/25 0233 Foot   Present on Original Admission: Y   Primary Wound Type: Moisture ass   Side: Left   Orientation: dorsal        Wound 05/19/25 0231 Moisture associated dermatitis Left lateral Foot (Active)   05/19/25 0231 Foot   Present on Original Admission: Y   Primary Wound Type: Moisture ass   Side: Left   Orientation: lateral        Wound 05/19/25 0231 Pressure Injury Right anterior Ankle (Active)   05/19/25 0231 Ankle   Present on Original Admission: Y   Primary Wound Type: Pressure inj   Side: Right   Orientation: anterior        Wound 05/19/25 0233 Moisture associated dermatitis Left anterior Foot (Active)   05/19/25 0233 Foot   Present on Original Admission:    Primary Wound Type: Moisture ass   Side: Left   Orientation: anterior        Wound 06/04/25 1030 Moisture associated dermatitis Right lateral Abdomen (Active)   06/04/25 1030 Abdomen   Present on Original Admission: Y   Primary Wound Type: Moisture ass   Side: Right   Orientation: lateral        Wound 06/04/25 1030 Moisture associated dermatitis Left lateral Abdomen (Active)   06/04/25 1030 Abdomen   Present on Original Admission: Y   Primary Wound Type: Moisture ass   Side: Left   Orientation: lateral        Wound 06/04/25 1030 Moisture associated dermatitis Right Groin (Active)   06/04/25 1030 Groin   Present on Original Admission: Y   Primary Wound Type: Moisture ass   Side: Right        Wound 06/04/25 1030 Moisture associated dermatitis Left Groin (Active)   06/04/25 1030  "Groin   Present on Original Admission: Y   Primary Wound Type: Moisture ass   Side: Left        Wound 06/04/25 1030 Moisture associated dermatitis Sacral spine (Active)   06/04/25 1030 Sacral spine   Present on Original Admission: Y   Primary Wound Type: Moisture ass         Wounds consistently followed by [facility] NP Sheree Tao     Labs:  Recent Labs   Lab 06/22/25  0759 06/23/25  0351 06/25/25  0415   WBC 11.22 11.91 12.53   HGB 10.3* 10.2* 9.9*   HCT 33.0* 33.3* 30.7*    206 133*     Recent Labs   Lab 06/22/25  0759 06/23/25  0520 06/25/25  0415    143 139   K 4.5 4.6 3.7    107 103   CO2 27 27 29   BUN 35* 35* 29*   CREATININE 1.0 0.9 0.8   GLU 90 159* 134*   CALCIUM 9.7 9.2 8.6*   MG 2.1 2.1 2.0   PHOS 2.3* 2.4* 2.7     Recent Labs   Lab 06/22/25  0759 06/23/25  0520 06/25/25  0415   ALKPHOS 112 120 95   ALT 95* 89* 55*   AST 48* 42 20   ALBUMIN 2.3* 2.1* 2.0*   PROT 5.6* 5.3* 5.1*   BILITOT 0.5 0.6 0.6       No results for input(s): "POCTGLUCOSE" in the last 72 hours.      Meds Scheduled:   amiodarone  400 mg Oral BID    Followed by    [START ON 7/1/2025] amiodarone  200 mg Oral Daily    amitriptyline  25 mg Oral QHS    ammonium lactate   Topical (Top) Daily    apixaban  5 mg Oral BID    aspirin  81 mg Oral QAM    atorvastatin  80 mg Oral Daily    fluticasone propionate  2 spray Each Nostril Daily    guaiFENesin  1,200 mg Oral BID    levalbuterol  1.2495 mg Nebulization Q6H    levETIRAcetam  500 mg Oral BID    levoFLOXacin  750 mg Oral Daily    mirtazapine  15 mg Oral QHS    pantoprazole  40 mg Oral Daily    roflumilast  500 mcg Oral Daily    sodium chloride 3%  4 mL Nebulization BID    tiotropium bromide  2 puff Inhalation Daily       Current Inpatient Problem List:  Active Hospital Problems    Diagnosis  POA    *Acute on chronic respiratory failure with hypoxia [J96.21]  Yes    Parainfluenza virus infection [B34.8]  Yes    Pneumonia of right lung due to infectious organism " [J18.9]  Yes    Cellulitis [L03.90]  Yes    TIA (transient ischemic attack) [G45.9]  Yes     ASA and statin      Metastasis to brain [C79.31]  Yes    Adenocarcinoma, lung, 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       CAD (coronary artery disease) [I25.10]  Yes     Chronic     -LHC (10/31/13) for stemi: pLAD 100%, Cx with Li's, mRCA 40%, LVEF 55%,. LVEDP 15   -Xience 3.0 x 33 mm to pLAD, post dilated with 3.5 NC and 4.0 NC.   -TTE (8/14/13): LVEF 55%, grade I diastolic dysfunction      HTN (hypertension), benign [I10]  Yes    SHOLA (obstructive sleep apnea) [G47.33]  Yes     CPAP at night      GERD (gastroesophageal reflux disease) [K21.9]  Yes     Continue PPI        Resolved Hospital Problems   No resolved problems to display.         Assessment / Plan:     Acute respiratory failure with hypoxia  COPD with acute exacerbation  Parainfluenza virus infection  Pneumonia   On Symbicort Spiriva and Daliresp at home. Follows up with pulmonology outpatient.   Completed 7 day course of Augmentin and doxycycline on 06/01  Continue Daliresp 500mcg daily  Continue Xopenex p.r.n.   Continue weaning high-flow nasal cannula  Continue guaifenesin 1200 mg b.i.d.  O2 requirements are stable, currently on 4 L nasal cannula.  Continue levofloxacin, completes course on 06/26  Pulmonology consulted, appreciate recommendations    Chest pain, new   Resolved   Evaluated by cardiology while in the hospital  Will need outpatient follow up with Dr. Ba with Cardiology     Cellulitis   Wound followed and managed by consistent, contracted [facility] NP  Completed 7 day course of doxy and Augmentin  Will need follow up with Podiatry after discharge     Restless leg syndrome   Continue amitriptyline 25 mg nightly     Adenocarcinoma, lung, left   Metastasis to brain  Completed treatment  "with chemo/XRT. Brain XRT for lesionsx2. off immunotherapy since 12/24  suspect the LLL area was consolidation of prior RXT changes and not malignancy and is nearly resolved.  CT on presentation with new SANJAY nodule, need op follow up with pulm  Continue prophylactic Keppra 500 mg b.i.d.    Right shoulder pain  Having some shoulder pain to his right shoulder, which seems to be some muscle spasms.    Started Robaxin 500 mg q.i.d. p.r.n. on 6/6    Compression fracture of L1 vertebrae with routine healing   TLSO brace when out of bed  F/u with Dr. Hopkins     Dyslipidemia  TIA  CAD   Continue aspirin   Continue atorvastatin 80 mg daily     Tachycardia   Episodes of SVT while at the hospital   Evaluated by Cardiology   Likely worsened by underlying hypoxia   Supplemental O2   Uncontrolled AFib with RVR on 06/16, he was placed on diltiazem drip though rate remained difficult to control  [place] [facility] [place] started on amiodarone for rate control   Continue amiodarone 400 mg b.i.d. for 8 days, then transition to amiodarone 200 mg daily       SHOLA   Continue CPAP nightly     GERD  Continue PPI daily     Left lateral foot wound   Right anterior ankle wound   Traumatic left dorsal foot wound   Left anterior foot venous ulcer   Skin tear right distal arm   Pressure injury nose  Wound followed and managed by consistent, contracted [facility] NP        ACP 6/19  "Dr. Zaldivar and I held discussion with the patient regarding his goals of care. The patient states that he would want to be intubated for mechanical ventilation if he were to decompensate but in the event of a cardiac arrest, he would not want CPR and would want to be allowed to die peacefully." Tommy Argueta NP  Code status changed to DNR     Psychiatric Assessment  Patient Health Questionnaire-9 (PHQ-9)    Date:  06/05/2025  Total Score: 15  Screening is Positive   Diagnosis and Treatment Plan:  Moderate MDD   Continue amitriptyline 25 mg nightly   Increasing " mirtazapine to 15 mg nightly, which will hopefully help with his appetite as well       Diet:  Cardiac   GI Ppx:  PPI   DVT Ppx:  Enoxaparin     Lines:  PIV   Drains:  None   Airways:n/a   Wounds:  Multiple    Goals of Care:   Restorative,  Treat infection  Optimize nutrition  Wound healing  Muscle strengthening  Restoration of ADL's  Improve mobility    Anticipated Disposition:    Anticipated d/c 6/24 IPR vs home    Follow up appointments:   Future Appointments   Date Time Provider Department Center   7/1/2025 10:30 AM NOM [facility]XRAY [facility]   7/1/2025 11:30 AM Bolivar Sr PA NOM NEUROS8 [facility]   7/8/2025  9:15 AM MEEKS, VISUAL FIELDS NOM OPHTHAL [facility]   7/8/2025 10:00 AM Cara Looney MD [facility] OPHTHAL [facility]   7/15/2025 10:00 AM Garrett Lloyd DPM NOM POD [facility]   7/23/2025 10:15 AM CV [facility] ECHO [facility]IA [place]   7/30/2025  8:30 AM NOM OIC-MRI1 [facility] MRI [facility]   7/30/2025 10:00 AM Bienvenido Henson MD [facility] RAD ONC [facility]   8/15/2025  9:20 AM Bienvenido Ward MD [facility] PULMON [place]   8/27/2025  9:00 AM Guido Trejo MD [facility] ENT [facility]   9/11/2025  8:30 AM Yan Palmer MD [facility] DERM [facility]         Ernestine Palomino, NP  Hospital Medicine [facility]    I have spent 58 minutes reviewing patient records, examining, and  of the patient/ patient's family with greater than 50% of the time spent with direct patient care and coordination.

## 2025-06-25 NOTE — PLAN OF CARE
Pt c/o anxiety and SOB at start of night but after receiving PM meds and hydroxyzine and applying CPAP pt slept well. Did not report further SOB. Remains in Afib on monitor. Repositioned as tolerated. Safety maintained.       Problem: Adult Inpatient Plan of Care  Goal: Plan of Care Review  Outcome: Progressing  Goal: Patient-Specific Goal (Individualized)  Outcome: Progressing  Goal: Absence of Hospital-Acquired Illness or Injury  Outcome: Progressing  Goal: Optimal Comfort and Wellbeing  Outcome: Progressing  Goal: Readiness for Transition of Care  Outcome: Progressing     Problem: Pneumonia  Goal: Fluid Balance  Outcome: Progressing  Goal: Resolution of Infection Signs and Symptoms  Outcome: Progressing  Goal: Effective Oxygenation and Ventilation  Outcome: Progressing     Problem: Wound  Goal: Optimal Coping  Outcome: Progressing  Goal: Optimal Functional Ability  Outcome: Progressing  Goal: Absence of Infection Signs and Symptoms  Outcome: Progressing  Goal: Improved Oral Intake  Outcome: Progressing  Goal: Optimal Pain Control and Function  Outcome: Progressing  Goal: Skin Health and Integrity  Outcome: Progressing  Goal: Optimal Wound Healing  Outcome: Progressing     Problem: Skin Injury Risk Increased  Goal: Skin Health and Integrity  Outcome: Progressing     Problem: Fall Injury Risk  Goal: Absence of Fall and Fall-Related Injury  Outcome: Progressing     Problem: Breathing Pattern Ineffective  Goal: Effective Breathing Pattern  Outcome: Progressing     Problem: Airway Clearance Ineffective  Goal: Effective Airway Clearance  Outcome: Progressing     Problem: Gas Exchange Impaired  Goal: Optimal Gas Exchange  Outcome: Progressing     Problem: Noninvasive Ventilation Acute  Goal: Effective Unassisted Ventilation and Oxygenation  Outcome: Progressing     Problem: Infection  Goal: Absence of Infection Signs and Symptoms  Outcome: Progressing

## 2025-06-25 NOTE — RESPIRATORY THERAPY
Pt had some periods of anxiety during the night but eventually settled down after receiving some medication, pt wore his home cpap with 4-6 liters of o2, no apparent disress was noted.

## 2025-06-25 NOTE — PROGRESS NOTES
Nutrition Follow Up Note    General Info/Comments:  Pt not seen by RD last week d/t pt put on ANH. Pt continues on a heart healthy diet. PO intake recently at 0% frequently, some improvement over the last 24 hours. Pt endorses drinking ONS - RD increased frequency to all meals as pt was agreeable. States he has had a poor appetite for the most part, but some days it improves slightly. LBM 6/25/2025. Current wt 194# - increase of 2# since last week. RD to continue to monitor.    Recommendations:  Recommendation/Intervention:   1. Continue heart healthy diet.   2. Continue Boost Plus at all meals.   3. Monitor and encourage PO intake.   4. Monitor weight changes and labs.    Goal:  Goals: Pt to consistently meet >50% assessed needs through PO and supplement intake by RD follow up.  Nutrition Goal Status: progressing towards goal    Assessment and Plan:  PES Statement  Nutrition PES Problem: Inadequate protein energy intake  Nutrition PES Etiology: Wound healing  Nutrition PES Signs and Symptoms: Wounds (Inconsistent PO intake)  Nutrition PES Status: Worsening    Diet Order:  Current Diet Order: Heart Healthy     Oral Nutrition Supplement: Boost Plus Chocolate at all meals    Food Allergies:  Food Allergies: NKFA    Spiritual, Cultural Beliefs, Hinduism Preferences that Affect Care:  Spiritual, Cultural Beliefs, Hinduism Practices, Values that Affect Care: no    Principal Problem:  Acute on chronic respiratory failure with hypoxia    Weights:  Wt Readings from Last 3 Encounters:   06/25/25 88.4 kg (194 lb 14.2 oz)   06/18/25 87.1 kg (192 lb 0.3 oz)   06/11/25 87.4 kg (192 lb 10.9 oz)       Estimated/Assessed Needs:  Energy Calorie Requirements (kcal): 1814 (25 kcal/kg IBW)  Energy Need Method: Kcal/kg (IBW)  Protein Requirements: 102 g (1.4 g/kg IBW)  Fluid Requirements (mL): 1814    Labs:  Lab Results   Component Value Date/Time     06/25/2025 04:15 AM     02/12/2025 02:24 AM    K 3.7 06/25/2025 04:15 AM     K 3.6 02/12/2025 02:24 AM    EGFRNORACEVR >60 06/25/2025 04:15 AM    EGFRNORACEVR >60.0 02/25/2025 10:33 AM    CALCIUM 8.6 (L) 06/25/2025 04:15 AM    CALCIUM 8.9 02/12/2025 02:24 AM    PHOS 2.7 06/25/2025 04:15 AM    PHOS 2.6 (L) 02/12/2025 02:24 AM    MG 2.0 06/25/2025 04:15 AM    ALBUMIN 2.0 (L) 06/25/2025 04:15 AM    ALBUMIN 3.0 (L) 02/12/2025 02:24 AM    .6 (H) 05/22/2025 12:22 PM    TRIG 64 02/07/2025 05:01 PM    HGBA1C 5.7 (H) 03/06/2024 09:53 AM       Medications:  Scheduled Meds:   amiodarone  400 mg Oral BID    Followed by    [START ON 7/1/2025] amiodarone  200 mg Oral Daily    amitriptyline  25 mg Oral QHS    ammonium lactate   Topical (Top) Daily    apixaban  5 mg Oral BID    aspirin  81 mg Oral QAM    atorvastatin  80 mg Oral Daily    fluticasone propionate  2 spray Each Nostril Daily    guaiFENesin  1,200 mg Oral BID    levalbuterol  1.2495 mg Nebulization Q6H    levETIRAcetam  500 mg Oral BID    levoFLOXacin  750 mg Oral Daily    mirtazapine  15 mg Oral QHS    pantoprazole  40 mg Oral Daily    roflumilast  500 mcg Oral Daily    sodium chloride 3%  4 mL Nebulization BID    tiotropium bromide  2 puff Inhalation Daily     Continuous Infusions:  PRN Meds:.  Current Facility-Administered Medications:     acetaminophen, 650 mg, Oral, Q8H PRN    benzonatate, 100 mg, Oral, TID PRN    calcium carbonate, 500 mg, Oral, TID PRN    hydrOXYzine HCL, 25 mg, Oral, TID PRN    levalbuterol, 1.2495 mg, Nebulization, Q4H PRN    melatonin, 6 mg, Oral, Nightly PRN    methocarbamoL, 500 mg, Oral, TID PRN    naloxone, 0.02 mg, Intravenous, PRN    nitroGLYCERIN, 0.4 mg, Sublingual, Q5 Min PRN    ondansetron, 4 mg, Oral, Q6H PRN    oxyCODONE-acetaminophen, 1 tablet, Oral, Q4H PRN    polyethylene glycol, 17 g, Oral, BID PRN    sodium chloride 0.9%, 10 mL, Intravenous, Q12H PRN    Nutrition Risk:  Nutrition Risk  Level of Risk/Frequency of Follow-up: moderate     Follow Up: Weekly    Monitor and Evaluation:  Monitor and  Evaluation  Monitor and Evaluation: Energy intake, Food and beverage intake, Protein intake, Diet order, Food and nutrition knowledge, Weight, Beliefs and attitudes, Electrolyte and renal panel, Gastrointestinal profile, Glucose/endocrine profile, Inflammatory profile, Lipid profile, Nutrition focused physical findings, Skin

## 2025-06-25 NOTE — PT/OT/SLP PROGRESS
"Occupational Therapy   Treatment    Name: Jordin Allen Jr.  MRN: [id number]  Admitting Diagnosis:  Acute on chronic respiratory failure with hypoxia       Recommendations:     Discharge Recommendations: Moderate Intensity Therapy  Discharge Equipment Recommendations:  to be determined by next level of care  Barriers to discharge:  Decreased caregiver support    Assessment:     Jordin Allen Jr. is a 79 y.o. male with a medical diagnosis of Acute on chronic respiratory failure with hypoxia.  He presents with performance deficits affecting function are weakness, impaired endurance, impaired self care skills, impaired functional mobility, impaired balance, decreased coordination, decreased upper extremity function, decreased safety awareness, impaired coordination, impaired cardiopulmonary response to activity, orthopedic precautions. Pt tolerated treatment well this session. Sat EOB for 12-15 minutes with CGA. Pt dropped to 83% O2 but improved to 88-89% as he focused on pursed lip breathing.     Rehab Prognosis:  Good; patient would benefit from acute skilled OT services to address these deficits and reach maximum level of function.       Plan:     Patient to be seen 5 x/week to address the above listed problems via self-care/home management, therapeutic activities, therapeutic exercises, neuromuscular re-education  Plan of Care Expires: 07/23/25  Plan of Care Reviewed with: patient    Subjective     Chief Complaint: "I need to catch my breath"  Patient/Family Comments/goals: "I want to be able to sit up in the chair"  Pain/Comfort:  Pain Rating 1: 0/10    Objective:     Communicated with: narcisa prior to session.  Patient found lying in bed with telemetry, pulse ox (continuous), peripheral IV upon OT entry to room.    General Precautions: Standard, fall    Orthopedic Precautions:spinal precautions  Braces: TLSO  Respiratory Status: Nasal cannula, flow 4L L/min     Occupational Performance:     Bed Mobility:  "   Patient completed Rolling/Turning to Right with contact guard assistance and with side rail  Patient completed Scooting/Bridging with contact guard assistance and with side rail  Patient completed Supine to Sit with minimum assistance and with side rail     Pt sat EOB with CGA for 12-15 minutes working on upright posture and sitting tolerance. O2 sats dropped to 83% but returned to 88-89% with pursed lip breathing.        [facility] 6 Click ADL: 14    Treatment & Education:  -Role of OT  -OT Poc  -safety awareness and precautions  -Level of A required for ADLs and functional mobility  -importance of OOB activity and energy conservation      Patient left lying in bed with all lines intact, call button in reach, and all needs met.     GOALS:   Multidisciplinary Problems       Occupational Therapy Goals          Problem: Occupational Therapy    Goal Priority Disciplines Outcome Interventions   Occupational Therapy Goal     OT, PT/OT Progressing    Description: Goals to be met by: 7/23/25  Patient will increase functional independence with ADLs by performing:    UE Dressing with Set-up Assistance.  LE Dressing with SBA using appropriate AE as needed.  Grooming while seated at sink with Set-up Assistance.  Toileting from 3-in-1 commode over toilet with min A for hygiene and clothing management.   Supine to sit with SPV   Step transfer with min A with RW.  Toilet transfer to 3-in-1 commode over toilet with min A with RW.                         DME Justifications:  No DME recommended requiring DME justifications    Time Tracking:     OT Date of Treatment: 06/25/25  OT Start Time: 1311  OT Stop Time: 1337  OT Total Time (min): 26 min    Billable Minutes:Therapeutic Activity 26    A client care conference was performed between the William GONZALES, and ROXANA, prior to treatment by ROXANA, to discuss the patient's status, treatment plan and established goals      OT/JOSSUE: JOSSUE     Number of JOSSUE visits since last OT visit:  2    6/25/2025

## 2025-06-25 NOTE — PROGRESS NOTES
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 His illness coarse started in late May with PNA and hypoxemic RF and he was Dc'ed to the LTAC but was hospitalized again from - for concern of MI and PNA/COPDe and again - for above. He was on broad spectrum Abx which were weaned to Levaquin on DC after he was able to be weaned from BIPAP to NC 3-5LPM.        Subjective:      Patient states he is feeling okay right now. No complaints. He does say when his O2 is titrated up and down it makes him a little breathless. He feels this is mostly due to decreased flow. He also feels very weak but is working with PT.     Objective:   Last 24 Hour Vital Signs:  BP  Min: 108/58  Max: 133/82  Temp  Av.6 °F (36.4 °C)  Min: 96.5 °F (35.8 °C)  Max: 98.2 °F (36.8 °C)  Pulse  Av.4  Min: 78  Max: 117  Resp  Av.2  Min: 16  Max: 34  SpO2  Av.1 %  Min: 87 %  Max: 99 %  Weight  Av.4 kg (194 lb 14.2 oz)  Min: 88.4 kg (194 lb 14.2 oz)  Max: 88.4 kg (194 lb 14.2 oz)  I/O last 3 completed shifts:  In: 1179 [P.O.:1179]  Out: 1500 [Urine:1500]    Physical Examination:  Physical Exam:  Gen: well developed, NAD  HEENT: NCAT, no LAD  CV: afib 130s, no murmur, radial pulses equal  Pulm: ND on 4LPM, lung sounds wheezes bilaterally  Abd: soft, NTND  Ext: no edema  Neuro: AAOx3, no FND  Skin: warm, dry, intact, no rash  Psych:  Appropriate mood and affect, normal judgement        Laboratory:  Trended Lab Data:  Recent Labs     25  0351 25  0520 25  0415   WBC 11.91  --  12.53   HGB 10.2*  --  9.9*   HCT 33.3*  --  30.7*     --  133*   NA  --  143 139   K  --  4.6 3.7   CL  --  107 103   CO2  --  27 29  "  BUN  --  35* 29*   CREATININE  --  0.9 0.8   GLU  --  159* 134*   BILITOT  --  0.6 0.6   AST  --  42 20   ALT  --  89* 55*   ALKPHOS  --  120 95   CALCIUM  --  9.2 8.6*   ALBUMIN  --  2.1* 2.0*   PROT  --  5.3* 5.1*   MG  --  2.1 2.0   PHOS  --  2.4* 2.7       Cardiac: No results for input(s): "TROPONINI", "CKTOTAL", "CKMB", "BNP" in the last 168 hours.    Urinalysis:   Lab Results   Component Value Date    COLORU Yellow 06/09/2025    SPECGRAV 1.010 06/09/2025    NITRITE Negative 06/09/2025    KETONESU Trace (A) 02/07/2025    UROBILINOGEN Negative 06/09/2025       Microbiology:  6/9 respiratory panel + parainfluenza  6/18 RVP negative  6/16 respiratory GS/Clx non-diagnostic      Radiology:  CXR images reviewed     I have personally reviewed the above labs and imaging.    Current Medications:     Infusions:       Scheduled:   amiodarone  400 mg Oral BID    Followed by    [START ON 7/1/2025] amiodarone  200 mg Oral Daily    amitriptyline  25 mg Oral QHS    ammonium lactate   Topical (Top) Daily    apixaban  5 mg Oral BID    aspirin  81 mg Oral QAM    atorvastatin  80 mg Oral Daily    fluticasone propionate  2 spray Each Nostril Daily    guaiFENesin  1,200 mg Oral BID    levalbuterol  1.2495 mg Nebulization Q6H    levETIRAcetam  500 mg Oral BID    levoFLOXacin  750 mg Oral Daily    mirtazapine  15 mg Oral QHS    pantoprazole  40 mg Oral Daily    roflumilast  500 mcg Oral Daily    sodium chloride 3%  4 mL Nebulization BID    tiotropium bromide  2 puff Inhalation Daily        PRN:    Current Facility-Administered Medications:     acetaminophen, 650 mg, Oral, Q8H PRN    benzonatate, 100 mg, Oral, TID PRN    calcium carbonate, 500 mg, Oral, TID PRN    hydrOXYzine HCL, 25 mg, Oral, TID PRN    levalbuterol, 1.2495 mg, Nebulization, Q4H PRN    melatonin, 6 mg, Oral, Nightly PRN    methocarbamoL, 500 mg, Oral, TID PRN    naloxone, 0.02 mg, Intravenous, PRN    nitroGLYCERIN, 0.4 mg, Sublingual, Q5 Min PRN    ondansetron, 4 " mg, Oral, Q6H PRN    oxyCODONE-acetaminophen, 1 tablet, Oral, Q4H PRN    polyethylene glycol, 17 g, Oral, BID PRN    sodium chloride 0.9%, 10 mL, Intravenous, Q12H PRN    Assessment:     Acute hypoxemic Respiratory failure  Pneumonia  COPD GOLD3  Lung Adenocarcinoma c/b radiation pneumonitis   Recent hospitalization with PNA and parainfluenza.  Dc'ed with coarse of levofloxacin. Finished course of prednisone for COPDe while inpatient.   -- Continue to wean Supplemental O2 for goal SpO2 88-92%  -- continue scheduled LAMA/LABA, adding budesonide nebs BID  -- asked RT to provide fan for dyspnea relief   -- continue flutter therapy TID  -- finishing Abx course 6/26  -- continue working with PT, vertical positioning, aspiration ppx    SHOLA  -- continue nightly CPAP    Art Fagan MD  PCCM Fellow    Case discussed with Dr. Mckeon    Portions of the record may have been created with voice-recognition software. Occasional wrong-word or sound-a-like substitutions may have occurred due to the inherent limitations of voice-recognition software. Read the chart carefully and recognize, using context, where substitutions have occurred.

## 2025-06-25 NOTE — PROGRESS NOTES
06/25/25 0900        Wound 06/04/25 1030 Moisture associated dermatitis Sacral spine   Date First Assessed/Time First Assessed: 06/04/25 1030   Present on Original Admission: Yes  Primary Wound Type: (c) Moisture associated dermatitis  Location: (c) Sacral spine   Dressing Appearance Moist drainage   Drainage Amount Scant   Drainage Characteristics/Odor Serous   Appearance Pink;Moist   Periwound Area Moist   Care Cleansed with:   Dressing   (clean with normal saline apply [place] daily)

## 2025-06-25 NOTE — PT/OT/SLP PROGRESS
Physical Therapy Treatment    Patient Name:  Jordin Allen Jr.   MRN:  [id number]    Recommendations:     Discharge Recommendations: Moderate Intensity Therapy  Discharge Equipment Recommendations: to be determined by next level of care  Barriers to discharge: Decreased caregiver support    Assessment:     Jodrin Allen Jr. is a 79 y.o. male admitted with a medical diagnosis of Acute on chronic respiratory failure with hypoxia.  He presents with the following impairments/functional limitations: weakness, impaired balance, impaired endurance, edema, decreased coordination, decreased ROM, impaired self care skills, impaired functional mobility, decreased upper extremity function, impaired cardiopulmonary response to activity, decreased lower extremity function .    Rehab Prognosis: Good and Fair; patient would benefit from acute skilled PT services to address these deficits and reach maximum level of function.    Recent Surgery: * No surgery found *      Plan:     During this hospitalization, patient to be seen 5 x/week to address the identified rehab impairments via therapeutic exercises, neuromuscular re-education, therapeutic activities and progress toward the following goals:    Plan of Care Expires:       Subjective     Chief Complaint: Pt c/o SOB with activity  Patient/Family Comments/goals: Pt wants to get better and return home.  Pain/Comfort:  Pain Rating 1: 0/10      Objective:     Communicated with PT prior to session.  Patient found HOB elevated with telemetry, pulse ox (continuous), peripheral IV, oxygen upon PT entry to room.     General Precautions: Standard, fall  Orthopedic Precautions: spinal precautions  Braces: TLSO  Respiratory Status: Nasal cannula, flow 4 L/min     Functional Mobility:  Bed Mobility:  Rolling Left:  minimum assistance  Rolling Right: minimum assistance  Scooting: maximal assistance  Supine to Sit: moderate assistance  Sit to Supine: moderate assistance  Balance: sitting  Good  -      AM-PAC 6 CLICK MOBILITY  Turning over in bed (including adjusting bedclothes, sheets and blankets)?: 3  Sitting down on and standing up from a chair with arms (e.g., wheelchair, bedside commode, etc.): 1  Moving from lying on back to sitting on the side of the bed?: 2  Moving to and from a bed to a chair (including a wheelchair)?: 1  Need to walk in hospital room?: 1  Climbing 3-5 steps with a railing?: 1  Basic Mobility Total Score: 9       Treatment & Education:  Pt performed bed mobility as noted above and ty sitting at EOB with close supervision ~ 15 minutes and performed (B) LE TE AROM and gentle stretching all available planes x 15 reps x 2 sets ea. Pt returned to bed in supine with head elevated and call button in reach. Tx limited this AM 2* pt requesting to eat breakfast while in supine with bed in chair position.     Patient left with bed in chair position with all lines intact, call button in reach, and bed alarm on..    GOALS:   Multidisciplinary Problems       Physical Therapy Goals          Problem: Physical Therapy    Goal Priority Disciplines Outcome Interventions   Physical Therapy Goal     PT, PT/OT Progressing    Description: Goals to be met by: DC     Patient will increase functional independence with mobility by performin. Supine to sit with [place]  2. Sit to supine with [place]  3. Sit to stand transfer with Supervision with RW  4. Gait  x 150 feet with Contact Guard Assistance using Rolling Walker.                          DME Justifications:  No DME recommended requiring DME justifications    Time Tracking:     PT Received On: 25  PT Start Time: 0750     PT Stop Time: 0815  PT Total Time (min): 25 min     Billable Minutes: Therapeutic Activity 15 and Therapeutic Exercise 10    Treatment Type: Treatment  PT/PTA: PTA     Number of PTA visits since last PT visit: 2     2025

## 2025-06-26 PROCEDURE — 25000003 PHARM REV CODE 250

## 2025-06-26 PROCEDURE — 25000242 PHARM REV CODE 250 ALT 637 W/ HCPCS

## 2025-06-26 PROCEDURE — 99900031 HC PATIENT EDUCATION (STAT)

## 2025-06-26 PROCEDURE — 97112 NEUROMUSCULAR REEDUCATION: CPT | Mod: CQ

## 2025-06-26 PROCEDURE — 94664 DEMO&/EVAL PT USE INHALER: CPT

## 2025-06-26 PROCEDURE — 11000001 HC ACUTE MED/SURG PRIVATE ROOM

## 2025-06-26 PROCEDURE — 25000242 PHARM REV CODE 250 ALT 637 W/ HCPCS: Performed by: FAMILY MEDICINE

## 2025-06-26 PROCEDURE — 94640 AIRWAY INHALATION TREATMENT: CPT

## 2025-06-26 PROCEDURE — 25000242 PHARM REV CODE 250 ALT 637 W/ HCPCS: Performed by: STUDENT IN AN ORGANIZED HEALTH CARE EDUCATION/TRAINING PROGRAM

## 2025-06-26 PROCEDURE — 99900035 HC TECH TIME PER 15 MIN (STAT)

## 2025-06-26 PROCEDURE — 94761 N-INVAS EAR/PLS OXIMETRY MLT: CPT

## 2025-06-26 PROCEDURE — 25000003 PHARM REV CODE 250: Performed by: STUDENT IN AN ORGANIZED HEALTH CARE EDUCATION/TRAINING PROGRAM

## 2025-06-26 PROCEDURE — 27000221 HC OXYGEN, UP TO 24 HOURS

## 2025-06-26 PROCEDURE — C1751 CATH, INF, PER/CENT/MIDLINE: HCPCS

## 2025-06-26 PROCEDURE — 97530 THERAPEUTIC ACTIVITIES: CPT

## 2025-06-26 PROCEDURE — 25000003 PHARM REV CODE 250: Performed by: HOSPITALIST

## 2025-06-26 PROCEDURE — 97110 THERAPEUTIC EXERCISES: CPT | Mod: CQ

## 2025-06-26 PROCEDURE — 97530 THERAPEUTIC ACTIVITIES: CPT | Mod: CQ

## 2025-06-26 RX ADMIN — Medication 4 ML: at 07:06

## 2025-06-26 RX ADMIN — AMITRIPTYLINE HYDROCHLORIDE 25 MG: 25 TABLET, FILM COATED ORAL at 08:06

## 2025-06-26 RX ADMIN — ATORVASTATIN CALCIUM 80 MG: 80 TABLET, FILM COATED ORAL at 09:06

## 2025-06-26 RX ADMIN — HYDROXYZINE HYDROCHLORIDE 25 MG: 25 TABLET, FILM COATED ORAL at 01:06

## 2025-06-26 RX ADMIN — ASPIRIN 81 MG: 81 TABLET, COATED ORAL at 09:06

## 2025-06-26 RX ADMIN — AMIODARONE HYDROCHLORIDE 400 MG: 100 TABLET ORAL at 08:06

## 2025-06-26 RX ADMIN — PANTOPRAZOLE SODIUM 40 MG: 40 TABLET, DELAYED RELEASE ORAL at 09:06

## 2025-06-26 RX ADMIN — BUDESONIDE 0.5 MG: 0.5 SUSPENSION RESPIRATORY (INHALATION) at 06:06

## 2025-06-26 RX ADMIN — GUAIFENESIN 1200 MG: 600 TABLET, MULTILAYER, EXTENDED RELEASE ORAL at 08:06

## 2025-06-26 RX ADMIN — AMIODARONE HYDROCHLORIDE 400 MG: 100 TABLET ORAL at 09:06

## 2025-06-26 RX ADMIN — MIRTAZAPINE 15 MG: 15 TABLET, FILM COATED ORAL at 08:06

## 2025-06-26 RX ADMIN — LEVALBUTEROL HYDROCHLORIDE 1.25 MG: 0.63 SOLUTION RESPIRATORY (INHALATION) at 01:06

## 2025-06-26 RX ADMIN — MELATONIN TAB 3 MG 6 MG: 3 TAB at 09:06

## 2025-06-26 RX ADMIN — APIXABAN 5 MG: 5 TABLET, FILM COATED ORAL at 08:06

## 2025-06-26 RX ADMIN — GUAIFENESIN 1200 MG: 600 TABLET, MULTILAYER, EXTENDED RELEASE ORAL at 09:06

## 2025-06-26 RX ADMIN — LEVALBUTEROL HYDROCHLORIDE 1.25 MG: 0.63 SOLUTION RESPIRATORY (INHALATION) at 07:06

## 2025-06-26 RX ADMIN — FLUTICASONE PROPIONATE 100 MCG: 50 SPRAY, METERED NASAL at 09:06

## 2025-06-26 RX ADMIN — LEVETIRACETAM 500 MG: 500 TABLET, FILM COATED ORAL at 08:06

## 2025-06-26 RX ADMIN — LEVALBUTEROL HYDROCHLORIDE 1.25 MG: 0.63 SOLUTION RESPIRATORY (INHALATION) at 06:06

## 2025-06-26 RX ADMIN — Medication: at 09:06

## 2025-06-26 RX ADMIN — APIXABAN 5 MG: 5 TABLET, FILM COATED ORAL at 09:06

## 2025-06-26 RX ADMIN — METHOCARBAMOL 500 MG: 500 TABLET ORAL at 09:06

## 2025-06-26 RX ADMIN — LEVETIRACETAM 500 MG: 500 TABLET, FILM COATED ORAL at 09:06

## 2025-06-26 RX ADMIN — BUDESONIDE 0.5 MG: 0.5 SUSPENSION RESPIRATORY (INHALATION) at 07:06

## 2025-06-26 RX ADMIN — HYDROXYZINE HYDROCHLORIDE 25 MG: 25 TABLET, FILM COATED ORAL at 06:06

## 2025-06-26 RX ADMIN — ROFLUMILAST 500 MCG: 500 TABLET ORAL at 09:06

## 2025-06-26 RX ADMIN — LEVALBUTEROL HYDROCHLORIDE 1.25 MG: 0.63 SOLUTION RESPIRATORY (INHALATION) at 12:06

## 2025-06-26 RX ADMIN — TIOTROPIUM BROMIDE INHALATION SPRAY 2 PUFF: 3.12 SPRAY, METERED RESPIRATORY (INHALATION) at 09:06

## 2025-06-26 NOTE — PLAN OF CARE
..[facility]  Interdisciplinary Team Conference        Jordin Allen Jr.    Conference Date: 6/26/2025  Admit Date: 6/3/2025       Active Hospital Problems    Diagnosis    *Acute on chronic respiratory failure with hypoxia    Parainfluenza virus infection    Pneumonia of right lung due to infectious organism    Cellulitis    TIA (transient ischemic attack)     ASA and statin      Metastasis to brain    Adenocarcinoma, lung, left    Dyslipidemia    COPD with acute exacerbation     Taking symbicort and spiriva and daliresp  Peak flow 270 l/min In April his Fev-1: 1.33 liters 47%.       CAD (coronary artery disease)     -[facility] (10/31/13) for stemi: pLAD 100%, Cx with Li's, mRCA 40%, LVEF 55%,. LVEDP 15   -Xience 3.0 x 33 mm to pLAD, post dilated with 3.5 NC and 4.0 NC.   -TTE (8/14/13): LVEF 55%, grade I diastolic dysfunction      HTN (hypertension), benign    SHOLA (obstructive sleep apnea)     CPAP at night      GERD (gastroesophageal reflux disease)     Continue PPI          Code Status: DNR  Advance Directive  (If Adv Dir status is received, view document under Adv Dir in header or Chart Review Media tab): Patient does not have Advance Directive, declines information.        Power of /Surrogate Decision Maker: N/A    LAB/TEST/TREATMENTS:    POCT Glucose   Date Value Ref Range Status   06/08/2025 145 (H) 70 - 110 mg/dL Final   05/16/2025 98 70 - 110 mg/dL Final   05/16/2025 83 70 - 110 mg/dL Final      Lab Results   Component Value Date    INR 1.0 02/07/2025    INR 1.0 02/07/2025    INR 1.0 02/07/2025     Lab Results   Component Value Date    BUN 29 (H) 06/25/2025    BUN 35 (H) 06/23/2025    BUN 35 (H) 06/22/2025     Lab Results   Component Value Date    PH 7.471 (H) 06/18/2025    PCO2 38.7 06/18/2025    PO2 32.9 (L) 06/18/2025    HCO3 27.7 06/18/2025     Lab Results   Component Value Date    WBC 12.53 06/25/2025    WBC 11.91 06/23/2025    WBC 11.22 06/22/2025     Susceptibility data  from last 90 days.  Collected Specimen Info Organism Amp/Sulbactam Ampicillin Cefazolin CEFEPIME ETEST Ceftriaxone Ciprofloxacin Ertapenem Gentamicin LEVOFLOXACIN ETEST Meropenem Pip/Tazo Tobramycin Trimeth/Sulfa   05/27/25 Respiratory from Sputum Candida albicans                05/25/25 Respiratory from Sputum, Expectorated Candida albicans                05/24/25 Respiratory from Sputum, Expectorated Candida albicans                05/22/25 Blood from Peripheral, Hand, Right Coagulase-negative Staphylococcus species                05/05/25 Abscess from Groin Escherichia coli  R  R  R  S  S  S  S  S  S  S  S  S  R   05/05/25 Abscess from Groin Bacteroides ovatus                         Dialysis: N/A    Provider Comments/Recommendations: returned after 2nd send out to [facility] r/t O2 requirement, abx end 6/25, consult pulm r/t lung status up and down    DIAGNOSTIC TEST    Radiology (Last 168 hours)               06/22 1017 Cardiac monitoring strips     06/22 0725 Cardiac monitoring strips     06/21 2318 Cardiac monitoring strips     06/21 0717 Cardiac monitoring strips     06/20 2007 Cardiac monitoring strips     06/20 1052 Cardiac monitoring strips              US Lower Extremity Veins Bilateral  Narrative: EXAMINATION:  US LOWER EXTREMITY VEINS BILATERAL    CLINICAL HISTORY:  r/o dvt;    TECHNIQUE:  Duplex and color flow Doppler and dynamic compression was performed of the bilateral lower extremity veins was performed.    COMPARISON:  05/22/2025    FINDINGS:  Duplex and color flow Doppler evaluation does not reveal any evidence of acute venous thrombosis in the common femoral, superficial femoral, greater saphenous, popliteal, peroneal, anterior tibial and posterior tibial veins bilaterally.  There is no reflux to suggest valvular incompetence.  Please note, there is scattered slow flow throughout the bilateral lower extremities.  Impression: No evidence of deep venous thrombosis in either lower  extremity.    Electronically signed by: Artur Velazquez MD  Date:    06/18/2025  Time:    16:02  CT Chest Without Contrast  Narrative: EXAMINATION:  CT CHEST WITHOUT CONTRAST    CLINICAL HISTORY:  Pneumonia, unresolved;    TECHNIQUE:  Using low dose technique the chest was surveyed from above the pulmonary apices through the posterior costophrenic angles.  Data was reconstructed for multiplanar images in axial, sagittal and coronal planes and for maximal intensity projection images in the the axial, sagittal and coronal planes.    COMPARISON:  Multiple priors, most recent chest x-ray 06/18/2025 and CTA 06/10/2025    FINDINGS:  Support tubes and lines: None.    Aorta: Mild ectasia of the ascending aorta measuring 4.1 cm, similar to prior.  Mild atherosclerosis.  Aberrant right subclavian artery.    Heart: Normal size.    Coronary arteries: Multi-vessel calcific atherosclerosis of the coronary arteries.    Pericardium: Trace pericardial effusion.    Central pulmonary arteries: Normal caliber.    Base of neck/thyroid: Unremarkable.    Lymph nodes: No supraclavicular, axillary, internal mammary, mediastinal, or hilar adenopathy.    Esophagus: Unremarkable.    Pleura: Bilateral small pleural effusions with adjacent compressive atelectasis, new from prior.    Upper abdomen: Fatty atrophy of the pancreas, unchanged.  Accessory splenules.    Body wall: Unremarkable.    Airways: Unremarkable.    Lungs: Paraseptal and centrilobular emphysema.  Right and left ground-glass opacities, mostly affecting the lower lobes, new from prior.  Treatment changes of the left hilar region with associated volume loss and architectural distortion.  Stable 4 mm nodule in the left upper lobe.    Bones: Degenerative changes of the spine.  Unchanged sclerotic focus in the posterior elements of T9.  Height loss of the superior endplate of L1, unchanged.  Impression: Interval development of bilateral small pleural effusions.    Right and left  ground-glass opacities, new from prior, concerning for progression of infectious process.    Additional findings as above.    Electronically signed by resident: Madeline Iniguez  Date:    06/18/2025  Time:    10:07    Electronically signed by: Geovanny Dennis  Date:    06/18/2025  Time:    11:16  X-Ray Chest AP Portable  Narrative: EXAMINATION:  XR CHEST AP PORTABLE    CLINICAL HISTORY:  Chest Pain;    TECHNIQUE:  Single frontal view of the chest was performed.    COMPARISON:  2025    FINDINGS:  Similar perihilar and lower lobe infiltrates within both lungs.  Findings suggestive of pneumonia versus pulmonary edema depending upon clinical scenario.  Stable appearance of the cardiomediastinal contours.  Probable small bilateral pleural effusions.  Impression: See above    Electronically signed by: Solitario Rivas  Date:    06/18/2025  Time:    08:11       NURSING:    Cognitive/Neuro/Behavioral WDL: WDL  Level of Consciousness (AVPU): alert  Arousal Level: opens eyes spontaneously  Orientation: oriented x 4  Speech: clear/fluent    Fall Risk Score: 19  History Of Fall (W/I 3 Mos): Y  Fall this admission: no    Restraints:         Infections:  No active infections  No active isolations      Telemetry: yes     Patient currently receiving pharmacological/non pharmacological interventions for pain: Yes     Urinary Incontinence: No  Bowel Incontinence: No    Peripheral IV Single Lumen 06/19/25 0854 20 G 1 3/4 in Anterior;Right Forearm (Active)   Site Assessment Clean;Dry;Intact 06/26/25 0400   Line Securement Device Antimicrobial Adhesive 06/24/25 0745   Extremity Assessment Distal to IV Ecchymotic 06/25/25 1901   Line Status Flushed 06/26/25 0400   Line 1 Status Flushed 06/26/25 0400   Line 2 Status Flushed 06/26/25 0400   Proximal Lumen Status Flushed 06/26/25 0400   Medial Lumen Status Flushed 06/26/25 0400   Distal Lumen Status Flushed 06/26/25 0400   Dressing Status Clean;Dry;Intact 06/26/25 0400   Dressing  Intervention Integrity maintained 06/26/25 0400   Dressing Change Due 06/23/25 06/22/25 1000   Site Change Due 06/23/25 06/22/25 0800   Reason Not Rotated Not due 06/25/25 0304   Number of days: 7       Peripheral IV 06/19/25 2111 22 G Right Hand (Active)   Site Assessment Clean;Dry;Intact 06/26/25 0400   Line Securement Device Antimicrobial Adhesive 06/24/25 0745   Extremity Assessment Distal to IV Ecchymotic 06/25/25 1901   Line Status Flushed 06/26/25 0400   Line 1 Status Flushed 06/26/25 0400   Line 2 Status Flushed 06/26/25 0400   Proximal Lumen Status Flushed 06/26/25 0400   Medial Lumen Status Flushed 06/26/25 0400   Distal Lumen Status Flushed 06/26/25 0400   Dressing Status Clean;Intact;Dry 06/26/25 0400   Dressing Intervention Integrity maintained 06/26/25 0400   Dressing Change Due 06/23/25 06/22/25 1000   Site Change Due 06/23/25 06/22/25 0800   Reason Not Rotated Not due 06/25/25 0304   Number of days: 6            Midline Catheter - Single Lumen 06/14/25 1245 Left basilic vein (medial side of arm) other (see comments) (Active)   Site Assessment Clean;Dry;Intact 06/25/25 1901   IV Device Securement catheter securement device 06/25/25 0745   Line Status Capped;Flushed;Saline locked 06/25/25 0745   Dressing Type CHG impregnated dressing/sponge 06/25/25 1901   Dressing Status Clean;Dry;Intact 06/25/25 1901   Dressing Intervention Integrity maintained 06/25/25 0745   Dressing Change Due 06/28/25 06/25/25 0745   Site Change Due 07/14/25 06/21/25 0845   Reason Not Rotated Not due 06/25/25 0745   Number of days: 11       Comments/Recommendations:  bm 6/25, midline, 2 pivs, doing better this time, poor appetite    Wound Care:             Wound 06/04/25 1030 Moisture associated dermatitis Sacral spine (Active)   06/04/25 1030 Sacral spine   Present on Original Admission: Y   Primary Wound Type: Moisture ass   Side:    Orientation:    Wound Approximate Age at First Assessment (Weeks):    Wound Number:    Is this  injury device related?:    Incision Type:    Closure Method:    Wound Description (Comments):    Type:    Additional Comments:    Ankle-Brachial Index:    Pulses:    Removal Indication and Assessment:    Wound Outcome:    Wound Image    06/23/25 0830   Dressing Appearance Moist drainage 06/25/25 1901   Drainage Amount Scant 06/25/25 0900   Drainage Characteristics/Odor Serous 06/25/25 0900   Appearance Pink;Moist 06/25/25 0900   Tissue loss description Partial thickness 06/22/25 0800   Red (%), Wound Tissue Color 100 % 06/23/25 0830   Periwound Area Moist 06/25/25 1901   Wound Edges Approximated 06/25/25 1901   Care Cleansed with: 06/25/25 0900   Dressing Applied;Other (comment) 06/24/25 1915   Periwound Care Moisture barrier applied 06/25/25 1901   Number of days: 22        Anjum Score: 14     Skin Interventions: Device Skin Pressure Protection: pressure points protected  Pressure Reduction Devices: heel offloading device utilized  Pressure Reduction Techniques: other (see comments)     Comments/Recommendations:  masd sacrum and buttocks, all other wounds are healed    Respiratory:       Flow (L/min) (Oxygen Therapy): 6 (NC  increased to  6L due to pt. working with Physical Therapy and became short of breath and HR rate ranging from 120-150. Prurse Lip breathing exercise instructed and performed. Pt.'s condition improved. I will continue to monitor the pt.)  Oxygen Concentration (%): 36          Vent Weaning: N/A    Comments/Recommendations:  4 L NC sat 98%, using home unit cpap at HS    Nutrition:    Dietary Orders (From admission, onward)       Start     Ordered    06/24/25 1715  Dietary nutrition supplements Boost Plus Nutritional Drink - Any flavor  All Meals      Comments: CHOCOLATE   Question Answer Comment   Route: By Mouth    Select PO Supplement: Boost Plus Nutritional Drink - Any flavor        06/24/25 1339    06/03/25 1626  Diet Heart Healthy  Diet effective now         06/03/25 1625                      Parenteral Nutrition: N/A    Wt Readings from Last 1 Encounters:   06/26/25 0600 86.6 kg (190 lb 14.7 oz)   06/25/25 0600 88.4 kg (194 lb 14.2 oz)   06/24/25 0600 88 kg (194 lb 0.1 oz)   06/23/25 0546 88.2 kg (194 lb 7.1 oz)   06/22/25 1510 88.1 kg (194 lb 3.6 oz)   06/18/25 0400 87.4 kg (192 lb 10.9 oz)   06/17/25 0600 92.2 kg (203 lb 4.2 oz)   06/16/25 0401 92.6 kg (204 lb 2.3 oz)   06/15/25 0400 89.6 kg (197 lb 8.5 oz)   06/14/25 0800 87.9 kg (193 lb 12.6 oz)   06/14/25 0424 87.9 kg (193 lb 12.6 oz)   06/13/25 0800 88 kg (194 lb 0.1 oz)   06/13/25 0600 88 kg (194 lb 0.1 oz)   06/12/25 0558 87.4 kg (192 lb 10.9 oz)   06/09/25 0800 86.3 kg (190 lb 4.1 oz)   06/09/25 0615 87.9 kg (193 lb 12.6 oz)   06/08/25 0444 90.4 kg (199 lb 4.7 oz)   06/05/25 1032 96 kg (211 lb 10.3 oz)   06/05/25 0600 96 kg (211 lb 10.3 oz)   06/04/25 0800 96 kg (211 lb 10.3 oz)   06/03/25 1629 96 kg (211 lb 10.3 oz)   06/03/25 1555 86.8 kg (191 lb 5.8 oz)       Comments/Recommendations: heart healthy diet & boost intake varies, slight improvement, wt 190 lbs     Pharmacy:        amiodarone  400 mg Oral BID    Followed by    [START ON 7/1/2025] amiodarone  200 mg Oral Daily    amitriptyline  25 mg Oral QHS    ammonium lactate   Topical (Top) Daily    apixaban  5 mg Oral BID    aspirin  81 mg Oral QAM    atorvastatin  80 mg Oral Daily    budesonide  0.5 mg Nebulization Q12H    fluticasone propionate  2 spray Each Nostril Daily    guaiFENesin  1,200 mg Oral BID    levalbuterol  1.2495 mg Nebulization Q6H    levETIRAcetam  500 mg Oral BID    mirtazapine  15 mg Oral QHS    pantoprazole  40 mg Oral Daily    roflumilast  500 mcg Oral Daily    sodium chloride 3%  4 mL Nebulization BID    tiotropium bromide  2 puff Inhalation Daily      Antibiotics (From admission, onward)      Start     Stop Route Frequency Ordered    06/12/25 0945  piperacillin-tazobactam 4.5 g in sodium chloride 0.9% 100 mL IVPB (ready to mix)         06/19/25 9455 IV Every 8  hours 25 0930    25 2100  mupirocin 2 % ointment         25 Nasl 2 times daily 25 1722           Antivirals (From admission, onward)      None             Current Facility-Administered Medications:     acetaminophen, 650 mg, Oral, Q8H PRN    benzonatate, 100 mg, Oral, TID PRN    calcium carbonate, 500 mg, Oral, TID PRN    hydrOXYzine HCL, 25 mg, Oral, TID PRN    levalbuterol, 1.2495 mg, Nebulization, Q4H PRN    melatonin, 6 mg, Oral, Nightly PRN    methocarbamoL, 500 mg, Oral, TID PRN    naloxone, 0.02 mg, Intravenous, PRN    nitroGLYCERIN, 0.4 mg, Sublingual, Q5 Min PRN    ondansetron, 4 mg, Oral, Q6H PRN    oxyCODONE-acetaminophen, 1 tablet, Oral, Q4H PRN    polyethylene glycol, 17 g, Oral, BID PRN    sodium chloride 0.9%, 10 mL, Intravenous, Q12H PRN     Comments/Recommendations: levaquin oral ends today    Physical Therapy:    Multidisciplinary Problems       Physical Therapy Goals          Problem: Physical Therapy    Goal Priority Disciplines Outcome Interventions   Physical Therapy Goal     PT, PT/OT Progressing    Description: Goals to be met by: DC     Patient will increase functional independence with mobility by performin. Supine to sit with Bowie  2. Sit to supine with Bowie  3. Sit to stand transfer with Supervision with RW  4. Gait  x 150 feet with Contact Guard Assistance using Rolling Walker.                            Comments/Recommendations:  bed mobility CGA, supine to sit min to mod assist, edge of bed sitting o2 dropped to 83% on 4 Liters, unable to walk after that, transfer to chair mod assist     Occupational Therapy:    Multidisciplinary Problems       Occupational Therapy Goals          Problem: Occupational Therapy    Goal Priority Disciplines Outcome Interventions   Occupational Therapy Goal     OT, PT/OT Progressing    Description: Goals to be met by: 25  Patient will increase functional independence with ADLs by performing:    UE  Dressing with Set-up Assistance.  LE Dressing with SBA using appropriate AE as needed.  Grooming while seated at sink with Set-up Assistance.  Toileting from 3-in-1 commode over toilet with min A for hygiene and clothing management.   Supine to sit with SPV   Step transfer with min A with RW.  Toilet transfer to 3-in-1 commode over toilet with min A with RW.                         Comments/Recommendations:  grooming hygiene set up assist, upper body dsg min to mod assist, lower body max assist, toileting dependent    Speech Therapy:    Multidisciplinary Problems       SLP Goals       Not on file                     Comments/Recommendations:  NA    Discharge Planning:    Prior to hospitilization cognitive status:: Alert/Oriented      People in Home: child(selene), adult, other relative(s)           Discharge Plan A: Rehab, Skilled Nursing Facility   Discharge Plan B: Home with family, Home Health, New Nursing Home placement - detention care facility   DME Needed Upon Discharge : other (see comments) (TBD)        Future Appointments   Date Time Provider Department Center   7/1/2025 10:30 AM [facility] OIC-XRAY [facility] XRAY [facility]   7/1/2025 11:30 AM Bolivar Sr PA [facility] NEUROS8 [facility]   7/8/2025  9:15 AM MEEKS, VISUAL FIELDS [facility] OPHTHAL [facility]   7/8/2025 10:00 AM Cara Looney MD [facility] OPHTHAL [facility]   7/15/2025 10:00 AM Garrett Lloyd, DPM NOM POD [facility] Ort   7/23/2025 10:15 AM CV [facility] ECHO [facility] [place]   7/30/2025  8:30 AM [facility] OIC-MRI1 [facility] MRI [facility]   7/30/2025 10:00 AM Bienvenido Henson MD [facility] RAD [facility]   8/15/2025  9:20 AM Bienvenido Ward MD [facility] PULMON [place]   8/27/2025  9:00 AM Guido Trejo MD [facility] ENT [facility]   9/11/2025  8:30 AM Yan Palmer MD [facility] DERM [facility]         Expected Discharge Date: 7/3/2025     Comments/Recommendations:  7/3 SNF NH to [facility], possibly future detention pt depending on therapy improvement, upon rounds - pt  updated on plan of care by interdisciplinary team      Family Conference:    No    Attendees Present:    {Gabby Wilson, RN CM Kathryn Abadie, RN CM Kristin Todd, RN ADON Renee Werling, [facility]  Freida Guerrero, RD  Ernestine Palomino, NP  Shayy Christy, RRT  Kelly PETERS Wound care  Pablo Hernandez & Jordin ALLISON       Continued/Extended Stay Review:    Management of at least one of the following complex respiratory conditions:  Other acute on chronic respiratory failure with hypoxia    Must meet at least three of the following concomitant treatments/intervention daily unless notes: (excludes PO meds unless notes)  Cardiac Monitoring  Rehab Therapy (PT/OT/ST) 1-3 hours a day greater than or equal to 5 days a week  Other oxygen weaning

## 2025-06-26 NOTE — PLAN OF CARE
"Pt pleasant and cooperative. Gets SOB when repositioned or cleaned. Anxious and anxiety med given X2.  Pt with low appetite "I am trying my best, I just do not have any hungry but I like fruits". Repositioned q2h for comfort. Safety maintained.      Problem: Adult Inpatient Plan of Care  Goal: Absence of Hospital-Acquired Illness or Injury  Outcome: Progressing     Problem: Adult Inpatient Plan of Care  Goal: Optimal Comfort and Wellbeing  Outcome: Progressing     Problem: Wound  Goal: Optimal Functional Ability  Outcome: Progressing     Problem: Wound  Goal: Improved Oral Intake  Outcome: Progressing     "

## 2025-06-26 NOTE — PROGRESS NOTES
[facility] Medicine   Progress Note  Room: 207/207   Patient Name: Jordin Allen Jr.  MRN: [id number]  Admit Date: 6/3/2025   Length of Stay:  LOS: 23 days   Attending Physician: Bernardino Guthrie MD  Nurse Practitioner: Ernestine Palomino NP    Date of Service: 06/26/2025    Principal Problem:    Acute on chronic respiratory failure with hypoxia    Brief HPI:     Jordin Allen Jr. is a 79 y.o. 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  He presented to [facility] ED on 01/22/2025 with complaints of shortness of breath and left foot redness.  Admitted to hospital medicine team for left foot cellulitis and acute hypoxic respiratory failure secondary to pneumonia/COPD exacerbation. CTA chest negative for PE. Emphysematous change with superimposed edema. Nodular focus within the anterior aspect of left upper lobe.  Started on nebs, prednisone, and empiric vanc/cefepime and doxycycline.  Respiratory panel positive for parainfluenza virus.  Course further complicated by sinus tach with PACs/18, for which he was started on diltiazem.  Blood cultures positive for staph, suspected to be contaminant.  Repeat blood cultures returned negative.  He will switch to Augmentin and doxy.  Ongoing episodes of SVT, thought to be due to underlying hypoxia.  He was able to be weaned down to high-flow nasal cannula 15 L.  Discharged to [facility] on 06/03/2025 for rehab, wound care, and O2 weaning.     6/9-6/11-sent out to [facility] for concerns of MI. Workup revealed findings concerning for pneumonia on CT. Started on empiric vanc and zosyn. Also started on 5 day course of prednisone for COPD exacerbation.    6/18 - 6/22-sent out to [facility] again for increasing O2 requirements and worsening AFib with RVR on diltiazem drip.  Use switch to amiodarone drip at [facility],  and transitioned to p.o. amiodarone successfully.  Infectious workup revealed concerns for aspiration pneumonia.  He was treated with vanc, cefepime, and levofloxacin.  Antibiotics de-escalated to oral levofloxacin.  He was weaned from BiPAP to nasal cannula at 3-5 L.      Interval History    Tolerated CPAP overnight   Stable on 4 L nasal cannula this morning with O2 sats 96%   Completed 7 day course of levofloxacin  Remains afebrile   Patient discussed during interdisciplinary team meeting today with attending physician, , nutrition, therapy, respiratory, nursing, pharmacist, and wound care.  Reporting poor appetite today  D/C plan- will likely need SNF to longterm        Subjective:     Review of Systems   Constitutional:  Positive for malaise/fatigue.   Respiratory:  Negative for cough, sputum production and shortness of breath.    Cardiovascular:  Negative for chest pain and leg swelling.   Gastrointestinal:  Negative for heartburn, nausea and vomiting.   Genitourinary:  Negative for dysuria and urgency.   Musculoskeletal:  Positive for joint pain (R shoulder). Negative for myalgias.   Neurological:  Positive for weakness. Negative for dizziness.   Psychiatric/Behavioral:  Negative for depression. The patient does not have insomnia.          Objective:     Vital Sign Range  Temp:  [97.5 °F (36.4 °C)-98.2 °F (36.8 °C)]   Pulse:  []   Resp:  [13-26]   BP: (102-146)/(58-79)   SpO2:  [92 %-100 %]     Body mass index is 28.19 kg/m².  Oxygen Concentration (%):  [36] 36        Intake/Output Summary (Last 24 hours) at 6/26/2025 0912  Last data filed at 6/26/2025 0600  Gross per 24 hour   Intake --   Output 650 ml   Net -650 ml       Physical Exam  Constitutional:       Appearance: He is ill-appearing.   HENT:      Head: Normocephalic and atraumatic.      Mouth/Throat:      Mouth: Mucous membranes are moist.      Pharynx: Oropharynx is clear.   Eyes:      Extraocular Movements: Extraocular movements  intact.      Pupils: Pupils are equal, round, and reactive to light.   Cardiovascular:      Rate and Rhythm: Normal rate. Rhythm irregular.   Pulmonary:      Breath sounds: Wheezing and rhonchi present.   Abdominal:      General: Bowel sounds are normal.      Palpations: Abdomen is soft.   Musculoskeletal:      Right lower leg: Edema present.      Left lower leg: Edema present.   Skin:     General: Skin is warm and dry.   Neurological:      Mental Status: He is alert and oriented to person, place, and time. Mental status is at baseline.   Psychiatric:         Mood and Affect: Affect is flat.         Wounds       Wound 05/19/25 0233 Moisture associated dermatitis Left dorsal Foot (Active)   05/19/25 0233 Foot   Present on Original Admission: Y   Primary Wound Type: Moisture ass   Side: Left   Orientation: dorsal        Wound 05/19/25 0231 Moisture associated dermatitis Left lateral Foot (Active)   05/19/25 0231 Foot   Present on Original Admission: Y   Primary Wound Type: Moisture ass   Side: Left   Orientation: lateral        Wound 05/19/25 0231 Pressure Injury Right anterior Ankle (Active)   05/19/25 0231 Ankle   Present on Original Admission: Y   Primary Wound Type: Pressure inj   Side: Right   Orientation: anterior        Wound 05/19/25 0233 Moisture associated dermatitis Left anterior Foot (Active)   05/19/25 0233 Foot   Present on Original Admission:    Primary Wound Type: Moisture ass   Side: Left   Orientation: anterior        Wound 06/04/25 1030 Moisture associated dermatitis Right lateral Abdomen (Active)   06/04/25 1030 Abdomen   Present on Original Admission: Y   Primary Wound Type: Moisture ass   Side: Right   Orientation: lateral        Wound 06/04/25 1030 Moisture associated dermatitis Left lateral Abdomen (Active)   06/04/25 1030 Abdomen   Present on Original Admission: Y   Primary Wound Type: Moisture ass   Side: Left   Orientation: lateral        Wound 06/04/25 1030 Moisture associated dermatitis  "Right Groin (Active)   06/04/25 1030 Groin   Present on Original Admission: Y   Primary Wound Type: Moisture ass   Side: Right        Wound 06/04/25 1030 Moisture associated dermatitis Left Groin (Active)   06/04/25 1030 Groin   Present on Original Admission: Y   Primary Wound Type: Moisture ass   Side: Left        Wound 06/04/25 1030 Moisture associated dermatitis Sacral spine (Active)   06/04/25 1030 Sacral spine   Present on Original Admission: Y   Primary Wound Type: Moisture ass         Wounds consistently followed by [facility] NP Sheree Tao     Labs:  Recent Labs   Lab 06/22/25  0759 06/23/25  0351 06/25/25  0415   WBC 11.22 11.91 12.53   HGB 10.3* 10.2* 9.9*   HCT 33.0* 33.3* 30.7*    206 133*     Recent Labs   Lab 06/22/25  0759 06/23/25  0520 06/25/25  0415    143 139   K 4.5 4.6 3.7    107 103   CO2 27 27 29   BUN 35* 35* 29*   CREATININE 1.0 0.9 0.8   GLU 90 159* 134*   CALCIUM 9.7 9.2 8.6*   MG 2.1 2.1 2.0   PHOS 2.3* 2.4* 2.7     Recent Labs   Lab 06/22/25  0759 06/23/25  0520 06/25/25  0415   ALKPHOS 112 120 95   ALT 95* 89* 55*   AST 48* 42 20   ALBUMIN 2.3* 2.1* 2.0*   PROT 5.6* 5.3* 5.1*   BILITOT 0.5 0.6 0.6       No results for input(s): "POCTGLUCOSE" in the last 72 hours.      Meds Scheduled:   amiodarone  400 mg Oral BID    Followed by    [START ON 7/1/2025] amiodarone  200 mg Oral Daily    amitriptyline  25 mg Oral QHS    ammonium lactate   Topical (Top) Daily    apixaban  5 mg Oral BID    aspirin  81 mg Oral QAM    atorvastatin  80 mg Oral Daily    budesonide  0.5 mg Nebulization Q12H    fluticasone propionate  2 spray Each Nostril Daily    guaiFENesin  1,200 mg Oral BID    levalbuterol  1.2495 mg Nebulization Q6H    levETIRAcetam  500 mg Oral BID    mirtazapine  15 mg Oral QHS    pantoprazole  40 mg Oral Daily    roflumilast  500 mcg Oral Daily    sodium chloride 3%  4 mL Nebulization BID    tiotropium bromide  2 puff Inhalation Daily       Current Inpatient " Problem List:  Active Hospital Problems    Diagnosis  POA    *Acute on chronic respiratory failure with hypoxia [J96.21]  Yes    Parainfluenza virus infection [B34.8]  Yes    Pneumonia of right lung due to infectious organism [J18.9]  Yes    Cellulitis [L03.90]  Yes    TIA (transient ischemic attack) [G45.9]  Yes     ASA and statin      Metastasis to brain [C79.31]  Yes    Adenocarcinoma, lung, 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       CAD (coronary artery disease) [I25.10]  Yes     Chronic     -LHC (10/31/13) for stemi: pLAD 100%, Cx with Li's, mRCA 40%, LVEF 55%,. LVEDP 15   -Xience 3.0 x 33 mm to pLAD, post dilated with 3.5 NC and 4.0 NC.   -TTE (8/14/13): LVEF 55%, grade I diastolic dysfunction      HTN (hypertension), benign [I10]  Yes    SHOLA (obstructive sleep apnea) [G47.33]  Yes     CPAP at night      GERD (gastroesophageal reflux disease) [K21.9]  Yes     Continue PPI        Resolved Hospital Problems   No resolved problems to display.         Assessment / Plan:     Acute respiratory failure with hypoxia  COPD with acute exacerbation  Parainfluenza virus infection  Pneumonia   On Symbicort Spiriva and Daliresp at home. Follows up with pulmonology outpatient.   Completed 7 day course of Augmentin and doxycycline on 06/01  Continue Daliresp 500mcg daily  Continue Xopenex p.r.n.   Continue weaning high-flow nasal cannula  Continue guaifenesin 1200 mg b.i.d.  O2 requirements are stable, currently on 4 L nasal cannula.  Completed 7 day course of levofloxacin on 6/25  Pulmonology consulted, appreciate recommendations    Chest pain, new   Resolved   Evaluated by cardiology while in the hospital  Will need outpatient follow up with Dr. Ba with Cardiology     Cellulitis   Wound followed and managed by consistent, contracted [facility] NP  Completed 7  "day course of doxy and Augmentin  Will need follow up with Podiatry after discharge     Restless leg syndrome   Continue amitriptyline 25 mg nightly     Adenocarcinoma, lung, left   Metastasis to brain  Completed treatment with chemo/XRT. Brain XRT for lesionsx2. off immunotherapy since 12/24  suspect the LLL area was consolidation of prior RXT changes and not malignancy and is nearly resolved.  CT on presentation with new SANJAY nodule, need op follow up with pulm  Continue prophylactic Keppra 500 mg b.i.d.    Right shoulder pain  Having some shoulder pain to his right shoulder, which seems to be some muscle spasms.    Started Robaxin 500 mg q.i.d. p.r.n. on 6/6    Compression fracture of L1 vertebrae with routine healing   TLSO brace when out of bed  F/u with Dr. Hopkins     Dyslipidemia  TIA  CAD   Continue aspirin   Continue atorvastatin 80 mg daily     Tachycardia   Episodes of SVT while at the hospital   Evaluated by Cardiology   Likely worsened by underlying hypoxia   Supplemental O2   Uncontrolled AFib with RVR on 06/16, he was placed on diltiazem drip though rate remained difficult to control  Sent back to [facility] and ultimately started on amiodarone for rate control   Continue amiodarone 400 mg b.i.d. for 8 days, then transition to amiodarone 200 mg daily       SHOLA   Continue CPAP nightly     GERD  Continue PPI daily     Left lateral foot wound   Right anterior ankle wound   Traumatic left dorsal foot wound   Left anterior foot venous ulcer   Skin tear right distal arm   Pressure injury nose  Wound followed and managed by consistent, contracted [facility] NP        ACP 6/19  "Dr. Zaldivar and I held discussion with the patient regarding his goals of care. The patient states that he would want to be intubated for mechanical ventilation if he were to decompensate but in the event of a cardiac arrest, he would not want CPR and would want to be allowed to die peacefully." Per ALBA Argueta NP  Code status changed to DNR     " Psychiatric Assessment  Patient Health Questionnaire-9 (PHQ-9)    Date:  06/05/2025  Total Score: 15  Screening is Positive   Diagnosis and Treatment Plan:  Moderate MDD   Continue amitriptyline 25 mg nightly   Increasing mirtazapine to 15 mg nightly, which will hopefully help with his appetite as well       Diet:  Cardiac   GI Ppx:  PPI   DVT Ppx:  Enoxaparin     Lines:  PIV   Drains:  None   Airways:n/a   Wounds:  Multiple    Goals of Care:   Restorative,  Treat infection  Optimize nutrition  Wound healing  Muscle strengthening  Restoration of ADL's  Improve mobility    Anticipated Disposition:    TBD    Follow up appointments:   Future Appointments   Date Time Provider Department Center   7/1/2025 10:30 AM NOM [facility]-XRAY [facility] XRAY [facility]   7/1/2025 11:30 AM Bolivar Sr PA [facility] NEUROS8 [facility]   7/8/2025  9:15 AM MEEKS, VISUAL FIELDS NOM OPHTHAL [facility]   7/8/2025 10:00 AM Cara Looney MD [facility] OPHTHAL [facility]   7/15/2025 10:00 AM Garrett Lloyd DPM NOM POD [facility] Ort   7/23/2025 10:15 AM CV [facility] ECHO [facility] [place]   7/30/2025  8:30 AM NOM OIC-MRI1 [facility] MRI [facility]   7/30/2025 10:00 AM Bienvenido Henson MD [facility] RAD ONC [facility]   8/15/2025  9:20 AM Bienvenido Ward MD [facility] PULMON [place]   8/27/2025  9:00 AM Guido Trejo MD NOM ENT [facility]   9/11/2025  8:30 AM Yan Palmer MD [facility] DERM [facility]         Ernestine Palomino, NP  Hospital Medicine [facility]    I have spent 56 minutes reviewing patient records, examining, and  of the patient/ patient's family with greater than 50% of the time spent with direct patient care and coordination.

## 2025-06-26 NOTE — PT/OT/SLP PROGRESS
Occupational Therapy   Treatment    Co-treatment with PT due to pt's requiring 2 skilled therapists to safely perform mobility and to accommodate activity tolerance     Name: Jordin Allen Jr.  MRN: [id number]  Admitting Diagnosis:  Acute on chronic respiratory failure with hypoxia       Recommendations:     Discharge Recommendations: Moderate Intensity Therapy  Discharge Equipment Recommendations:  to be determined by next level of care  Barriers to discharge:       Assessment:     Jordin Allen Jr. is a 79 y.o. male with a medical diagnosis of Acute on chronic respiratory failure with hypoxia.  He presents with debility. Performance deficits affecting function are weakness, impaired endurance, impaired self care skills, impaired functional mobility, impaired balance, decreased coordination, decreased upper extremity function, decreased safety awareness, impaired coordination, impaired cardiopulmonary response to activity, orthopedic precautions.     Rehab Prognosis:  Fair; patient would benefit from acute skilled OT services to address these deficits and reach maximum level of function.       Plan:     Patient to be seen 5 x/week to address the above listed problems via self-care/home management, therapeutic activities, therapeutic exercises, neuromuscular re-education  Plan of Care Expires: 07/23/25  Plan of Care Reviewed with: patient    Subjective     Chief Complaint: SOB  Patient/Family Comments/goals: no family in room  Pain/Comfort:       Objective:     Communicated with: Patient prior to session.  Patient found HOB elevated with   upon OT entry to room.    General Precautions: Standard, fall    Orthopedic Precautions:spinal precautions  Braces: TLSO  Respiratory Status: Room air     Occupational Performance:     Bed Mobility:    Patient completed Rolling/Turning to Left with  moderate assistance and 2 persons  Patient completed Rolling/Turning to Right with moderate assistance and 2 persons  Patient  completed Scooting/Bridging with total assistance and 2 persons  Patient completed Supine to Sit with minimum assistance and 2 persons  Patient completed Sit to Supine with minimum assistance and 2 persons       Activities of Daily Living:  Feeding:  modified independence    Grooming: supervision set up assist to perform task in bed  Upper Body Dressing: minimum assistance donning gown  Toileting: dependence bowel and bladder incontinent      AMPA 6 Click ADL: 14    Treatment & Education:  Bed mobility to EOB as noted above.   Patient sat EOB ~15 min.   Patient with increased HR and noted SOB.   Patient and therapists were talking during entire session which probably contributed to each.   Patient returned to supine RT notified and entered room followed by nurse and Charge Nurse.      Patient left HOB elevated with all lines intact, call button in reach, and RT, Nurse, and charge nurse present    GOALS:   Multidisciplinary Problems       Occupational Therapy Goals          Problem: Occupational Therapy    Goal Priority Disciplines Outcome Interventions   Occupational Therapy Goal     OT, PT/OT Progressing    Description: Goals to be met by: 7/23/25  Patient will increase functional independence with ADLs by performing:    UE Dressing with Set-up Assistance.  LE Dressing with SBA using appropriate AE as needed.  Grooming while seated at sink with Set-up Assistance.  Toileting from 3-in-1 commode over toilet with min A for hygiene and clothing management.   Supine to sit with SPV   Step transfer with min A with RW.  Toilet transfer to 3-in-1 commode over toilet with min A with RW.                         DME Justifications:  No DME recommended requiring DME justifications    Time Tracking:     OT Date of Treatment: 06/26/25  OT Start Time: 0920  OT Stop Time: 0946  OT Total Time (min): 26 min    Billable Minutes:Therapeutic Activity 26 min    OT/JOSSUE: JOSSUE     Number of JOSSUE visits since last OT visit: 3    6/26/2025

## 2025-06-26 NOTE — CARE UPDATE
Patient remains on CPAP 5L (HOME UNIT), throughout the night, No respiratory distress noted. Will continue to monitor.

## 2025-06-26 NOTE — PT/OT/SLP PROGRESS
Physical Therapy Treatment    Patient Name:  Jordin Allen Jr.   MRN:  [id number]    Recommendations:     Discharge Recommendations: Moderate Intensity Therapy (Simultaneous filing. User may not have seen previous data.)  Discharge Equipment Recommendations: to be determined by next level of care  Barriers to discharge: Decreased caregiver support    Assessment:     Jordin Allen Jr. is a 79 y.o. male admitted with a medical diagnosis of Acute on chronic respiratory failure with hypoxia.  He presents with the following impairments/functional limitations: weakness, impaired functional mobility, impaired cardiopulmonary response to activity, impaired endurance, impaired balance, decreased upper extremity function, decreased lower extremity function, impaired self care skills, decreased ROM .    Rehab Prognosis: Good; patient would benefit from acute skilled PT services to address these deficits and reach maximum level of function.    Recent Surgery: * No surgery found *      Plan:     During this hospitalization, patient to be seen 5 x/week to address the identified rehab impairments via therapeutic exercises, neuromuscular re-education and progress toward the following goals:    Plan of Care Expires:       Subjective     Chief Complaint: Pt c/o SOB with activity.  Patient/Family Comments/goals: Pt wants to get better and return home.  Pain/Comfort:  Pain Rating 1: 0/10      Objective:     Communicated with PT prior to session.  Patient found HOB elevated with pulse ox (continuous), peripheral IV, telemetry upon PT entry to room.     General Precautions: Standard, fall (Simultaneous filing. User may not have seen previous data.)  Orthopedic Precautions: spinal precautions  Braces: TLSO (Simultaneous filing. User may not have seen previous data.)  Respiratory Status: Nasal cannula, flow 4 L/min     Functional Mobility:  Bed Mobility:  Rolling Left:  stand by assistance and minimum assistance  Rolling Right: stand by  assistance and minimum assistance  Scooting: maximal assistance  Supine to Sit: minimum assistance  Sit to Supine: minimum assistance  Balance: sitting  Good -      AM-PAC 6 CLICK MOBILITY  Turning over in bed (including adjusting bedclothes, sheets and blankets)?: 3  Sitting down on and standing up from a chair with arms (e.g., wheelchair, bedside commode, etc.): 1  Moving from lying on back to sitting on the side of the bed?: 3  Moving to and from a bed to a chair (including a wheelchair)?: 1  Need to walk in hospital room?: 1  Climbing 3-5 steps with a railing?: 1  Basic Mobility Total Score: 10       Treatment & Education:  Pt performed bed mobility as noted above and ty sitting at EOB with SBA/close supervision ~ 15 minutes and performed (B) LE TE AROM and gentle stretching all available planes x 15 reps x 2 sets ea. Pt performed x 10 minutes of sitting balance activity then returned to bed in supine with head elevated and call button in reach. Tx limited 2* increased HR of 156 BPM and decreased O2 SAT of 84%. Nursing notified. Pt performed additional TE in PM (B) LE TE AROM and gentle stretching while in supine all available planes x 15 reps x 2 sets ea and performed bed mobility for proper postioning and left in supine with head elevated and call button in reach.    Patient left HOB elevated with all lines intact, call button in reach, bed alarm on, and nursing present..    GOALS:   Multidisciplinary Problems       Physical Therapy Goals          Problem: Physical Therapy    Goal Priority Disciplines Outcome Interventions   Physical Therapy Goal     PT, PT/OT Progressing    Description: Goals to be met by: DC     Patient will increase functional independence with mobility by performin. Supine to sit with Mora  2. Sit to supine with Mora  3. Sit to stand transfer with Supervision with RW  4. Gait  x 150 feet with Contact Guard Assistance using Rolling Walker.                           DME Justifications:  No DME recommended requiring DME justifications    Time Tracking:     PT Received On: 06/26/25  PT Start Time: 0920     PT Stop Time: 0950  PT Total Time (min): 30 min + 25 min    Billable Minutes: Therapeutic Activity 10, Therapeutic Exercise 30, and Neuromuscular Re-education 15    Treatment Type: Treatment  PT/PTA: PTA     Number of PTA visits since last PT visit: 3     06/26/2025

## 2025-06-26 NOTE — PROGRESS NOTES
06/26/25 0930        Wound 06/04/25 1030 Moisture associated dermatitis Sacral spine   Date First Assessed/Time First Assessed: 06/04/25 1030   Present on Original Admission: Yes  Primary Wound Type: (c) Moisture associated dermatitis  Location: (c) Sacral spine   Dressing Appearance Open to air   Drainage Amount None   Periwound Area Moist   Care Cleansed with:;Wound cleanser   Dressing   (Sacrum and buttocks clean with normal saline apply Triad daily)

## 2025-06-27 LAB
ABSOLUTE EOSINOPHIL (OHS): 0.08 K/UL
ABSOLUTE MONOCYTE (OHS): 0.61 K/UL (ref 0.3–1)
ABSOLUTE NEUTROPHIL COUNT (OHS): 7.47 K/UL (ref 1.8–7.7)
ALBUMIN SERPL BCP-MCNC: 1.7 G/DL (ref 3.5–5.2)
ALP SERPL-CCNC: 89 UNIT/L (ref 40–150)
ALT SERPL W/O P-5'-P-CCNC: 36 UNIT/L (ref 10–44)
ANION GAP (OHS): 6 MMOL/L (ref 8–16)
AST SERPL-CCNC: 17 UNIT/L (ref 11–45)
BASOPHILS # BLD AUTO: 0.01 K/UL
BASOPHILS NFR BLD AUTO: 0.1 %
BILIRUB SERPL-MCNC: 0.8 MG/DL (ref 0.1–1)
BUN SERPL-MCNC: 20 MG/DL (ref 8–23)
CALCIUM SERPL-MCNC: 8.4 MG/DL (ref 8.7–10.5)
CHLORIDE SERPL-SCNC: 101 MMOL/L (ref 95–110)
CO2 SERPL-SCNC: 30 MMOL/L (ref 23–29)
CREAT SERPL-MCNC: 0.7 MG/DL (ref 0.5–1.4)
ERYTHROCYTE [DISTWIDTH] IN BLOOD BY AUTOMATED COUNT: 17.2 % (ref 11.5–14.5)
GFR SERPLBLD CREATININE-BSD FMLA CKD-EPI: >60 ML/MIN/1.73/M2
GLUCOSE SERPL-MCNC: 106 MG/DL (ref 70–110)
HCT VFR BLD AUTO: 30.3 % (ref 40–54)
HGB BLD-MCNC: 9.7 GM/DL (ref 14–18)
IMM GRANULOCYTES # BLD AUTO: 0.3 K/UL (ref 0–0.04)
IMM GRANULOCYTES NFR BLD AUTO: 3.4 % (ref 0–0.5)
LYMPHOCYTES # BLD AUTO: 0.3 K/UL (ref 1–4.8)
MAGNESIUM SERPL-MCNC: 2 MG/DL (ref 1.6–2.6)
MCH RBC QN AUTO: 28.4 PG (ref 27–31)
MCHC RBC AUTO-ENTMCNC: 32 G/DL (ref 32–36)
MCV RBC AUTO: 89 FL (ref 82–98)
NUCLEATED RBC (/100WBC) (OHS): 0 /100 WBC
PHOSPHATE SERPL-MCNC: 2.3 MG/DL (ref 2.7–4.5)
PLATELET # BLD AUTO: 81 K/UL (ref 150–450)
PLATELET BLD QL SMEAR: ABNORMAL
PMV BLD AUTO: 10.9 FL (ref 9.2–12.9)
POTASSIUM SERPL-SCNC: 3.7 MMOL/L (ref 3.5–5.1)
PROT SERPL-MCNC: 5 GM/DL (ref 6–8.4)
RBC # BLD AUTO: 3.41 M/UL (ref 4.6–6.2)
RELATIVE EOSINOPHIL (OHS): 0.9 %
RELATIVE LYMPHOCYTE (OHS): 3.4 % (ref 18–48)
RELATIVE MONOCYTE (OHS): 7 % (ref 4–15)
RELATIVE NEUTROPHIL (OHS): 85.2 % (ref 38–73)
SODIUM SERPL-SCNC: 137 MMOL/L (ref 136–145)
WBC # BLD AUTO: 8.77 K/UL (ref 3.9–12.7)

## 2025-06-27 PROCEDURE — 94640 AIRWAY INHALATION TREATMENT: CPT

## 2025-06-27 PROCEDURE — 36415 COLL VENOUS BLD VENIPUNCTURE: CPT | Performed by: STUDENT IN AN ORGANIZED HEALTH CARE EDUCATION/TRAINING PROGRAM

## 2025-06-27 PROCEDURE — 97530 THERAPEUTIC ACTIVITIES: CPT

## 2025-06-27 PROCEDURE — 11000001 HC ACUTE MED/SURG PRIVATE ROOM

## 2025-06-27 PROCEDURE — 84100 ASSAY OF PHOSPHORUS: CPT | Performed by: STUDENT IN AN ORGANIZED HEALTH CARE EDUCATION/TRAINING PROGRAM

## 2025-06-27 PROCEDURE — 25000003 PHARM REV CODE 250: Performed by: STUDENT IN AN ORGANIZED HEALTH CARE EDUCATION/TRAINING PROGRAM

## 2025-06-27 PROCEDURE — 83735 ASSAY OF MAGNESIUM: CPT | Performed by: STUDENT IN AN ORGANIZED HEALTH CARE EDUCATION/TRAINING PROGRAM

## 2025-06-27 PROCEDURE — 85025 COMPLETE CBC W/AUTO DIFF WBC: CPT | Performed by: STUDENT IN AN ORGANIZED HEALTH CARE EDUCATION/TRAINING PROGRAM

## 2025-06-27 PROCEDURE — 25000242 PHARM REV CODE 250 ALT 637 W/ HCPCS: Performed by: STUDENT IN AN ORGANIZED HEALTH CARE EDUCATION/TRAINING PROGRAM

## 2025-06-27 PROCEDURE — 94761 N-INVAS EAR/PLS OXIMETRY MLT: CPT

## 2025-06-27 PROCEDURE — 99900035 HC TECH TIME PER 15 MIN (STAT)

## 2025-06-27 PROCEDURE — 25000242 PHARM REV CODE 250 ALT 637 W/ HCPCS: Performed by: FAMILY MEDICINE

## 2025-06-27 PROCEDURE — 94664 DEMO&/EVAL PT USE INHALER: CPT

## 2025-06-27 PROCEDURE — 99233 SBSQ HOSP IP/OBS HIGH 50: CPT | Mod: GC,,, | Performed by: INTERNAL MEDICINE

## 2025-06-27 PROCEDURE — C1751 CATH, INF, PER/CENT/MIDLINE: HCPCS

## 2025-06-27 PROCEDURE — 25000242 PHARM REV CODE 250 ALT 637 W/ HCPCS

## 2025-06-27 PROCEDURE — 25000003 PHARM REV CODE 250: Performed by: HOSPITALIST

## 2025-06-27 PROCEDURE — 27000173 HC ACAPELLA DEVICE DH OR DM

## 2025-06-27 PROCEDURE — 80053 COMPREHEN METABOLIC PANEL: CPT | Performed by: STUDENT IN AN ORGANIZED HEALTH CARE EDUCATION/TRAINING PROGRAM

## 2025-06-27 PROCEDURE — 63600175 PHARM REV CODE 636 W HCPCS: Performed by: STUDENT IN AN ORGANIZED HEALTH CARE EDUCATION/TRAINING PROGRAM

## 2025-06-27 PROCEDURE — 27000221 HC OXYGEN, UP TO 24 HOURS

## 2025-06-27 PROCEDURE — 25000003 PHARM REV CODE 250

## 2025-06-27 RX ORDER — DRONABINOL 2.5 MG/1
5 CAPSULE ORAL 2 TIMES DAILY
Status: DISCONTINUED | OUTPATIENT
Start: 2025-06-27 | End: 2025-06-30

## 2025-06-27 RX ORDER — BISACODYL 10 MG/1
10 SUPPOSITORY RECTAL DAILY PRN
Status: DISCONTINUED | OUTPATIENT
Start: 2025-06-27 | End: 2025-07-10 | Stop reason: HOSPADM

## 2025-06-27 RX ADMIN — APIXABAN 5 MG: 5 TABLET, FILM COATED ORAL at 08:06

## 2025-06-27 RX ADMIN — AMIODARONE HYDROCHLORIDE 400 MG: 100 TABLET ORAL at 08:06

## 2025-06-27 RX ADMIN — ROFLUMILAST 500 MCG: 500 TABLET ORAL at 08:06

## 2025-06-27 RX ADMIN — FLUTICASONE PROPIONATE 100 MCG: 50 SPRAY, METERED NASAL at 08:06

## 2025-06-27 RX ADMIN — LEVETIRACETAM 500 MG: 500 TABLET, FILM COATED ORAL at 09:06

## 2025-06-27 RX ADMIN — MELATONIN TAB 3 MG 6 MG: 3 TAB at 09:06

## 2025-06-27 RX ADMIN — TIOTROPIUM BROMIDE INHALATION SPRAY 2 PUFF: 3.12 SPRAY, METERED RESPIRATORY (INHALATION) at 08:06

## 2025-06-27 RX ADMIN — ASPIRIN 81 MG: 81 TABLET, COATED ORAL at 08:06

## 2025-06-27 RX ADMIN — METHOCARBAMOL 500 MG: 500 TABLET ORAL at 09:06

## 2025-06-27 RX ADMIN — LEVALBUTEROL HYDROCHLORIDE 1.25 MG: 0.63 SOLUTION RESPIRATORY (INHALATION) at 07:06

## 2025-06-27 RX ADMIN — PANTOPRAZOLE SODIUM 40 MG: 40 TABLET, DELAYED RELEASE ORAL at 08:06

## 2025-06-27 RX ADMIN — AMITRIPTYLINE HYDROCHLORIDE 25 MG: 25 TABLET, FILM COATED ORAL at 09:06

## 2025-06-27 RX ADMIN — APIXABAN 5 MG: 5 TABLET, FILM COATED ORAL at 09:06

## 2025-06-27 RX ADMIN — ATORVASTATIN CALCIUM 80 MG: 80 TABLET, FILM COATED ORAL at 08:06

## 2025-06-27 RX ADMIN — DRONABINOL 5 MG: 2.5 CAPSULE ORAL at 09:06

## 2025-06-27 RX ADMIN — Medication: at 08:06

## 2025-06-27 RX ADMIN — AMIODARONE HYDROCHLORIDE 400 MG: 100 TABLET ORAL at 09:06

## 2025-06-27 RX ADMIN — LEVALBUTEROL HYDROCHLORIDE 1.25 MG: 0.63 SOLUTION RESPIRATORY (INHALATION) at 08:06

## 2025-06-27 RX ADMIN — BUDESONIDE 0.5 MG: 0.5 SUSPENSION RESPIRATORY (INHALATION) at 08:06

## 2025-06-27 RX ADMIN — BISACODYL 10 MG: 10 SUPPOSITORY RECTAL at 10:06

## 2025-06-27 RX ADMIN — Medication 4 ML: at 08:06

## 2025-06-27 RX ADMIN — GUAIFENESIN 1200 MG: 600 TABLET, MULTILAYER, EXTENDED RELEASE ORAL at 09:06

## 2025-06-27 RX ADMIN — LEVETIRACETAM 500 MG: 500 TABLET, FILM COATED ORAL at 08:06

## 2025-06-27 RX ADMIN — LEVALBUTEROL HYDROCHLORIDE 1.25 MG: 0.63 SOLUTION RESPIRATORY (INHALATION) at 01:06

## 2025-06-27 RX ADMIN — BUDESONIDE 0.5 MG: 0.5 SUSPENSION RESPIRATORY (INHALATION) at 07:06

## 2025-06-27 RX ADMIN — GUAIFENESIN 1200 MG: 600 TABLET, MULTILAYER, EXTENDED RELEASE ORAL at 08:06

## 2025-06-27 NOTE — PROGRESS NOTES
[facility]  Wound Care Progress Note  Attending Physician: Bernardino Guthrie MD  Primary Care Physician: Sierra Landry MD  Date of Admit: 6/3/2025      Chief Complaint    Wound to left leg   History of Present Illness:     Per attending   HPI: Jordin Allen Jr. Is a 79 y.o.M with pmhx of COPD on chronic 4L NC, GERD, HTN, SHOLA, CAD, NAFLD, dyslipidemia, L lung cancer s/p chemo and radiation c/b radiation necrosis, off immunotherapy since 12/24 metastasis to brain s/p XRT, anxiety, who [place] [facility] [place] with acute onset need for increased oxygen associated with chest pain beginning last night. Patient has not felt this chest pain since the episode. Currently no chest pain. Does endorse SOB and cough. Reports he is not coughing up anything because he feels like it is stuck in his chest. Denies fever, chills, chest pain, palpitations,  abdominal pain, n/v/d, dysuria, headaches, or any other symptoms at this time.      In ED: AF, VSS on 6-8L HFNC. CBC with leukocytosis. Hgb 11.9. CMP notable for Na 135, mild elevation in ALT. Phos 2.4. BNP 62. HS trop 12. LA 0.9. covid/flu negative. ABG with pH 7.499, pCO2 56.3, pO2 79, HCO3 43.8. Blood cultures pending. CTA chest with no evidence of acute PE, mild ill-defined airspace opacities in the dependent portions of the right upper and middle lobes when compared to 05/22/2025, nonspecific.  This could be due to atelectasis, aspiration and/or pneumonia 5 mm left upper lobe nodule, unchanged from 05/22/2025 but new when compared to 03/05/2025.  A follow-up chest CT in 1 year could be considered if the patient is at increased risk of developing lung cancer, such as from a smoking history. S/p duoneb, dexamethasone 10mg inj, vanc and zosyn in ED. Admitted to  for further evaluation.     6/4/2025 Patient seen for wound to left leg  Patient seen lying in bed. No acute distress. Wound care done with nurse. No issues or complaints at  time. Cont POC Plan to f/u on 6/5 6/6/2025 Patient seen lying in bed. No acute distress. Wound care done with nurse. No issues or complaints at time. Cont POC Plan to f/u on 6/9 6/12/2025 Patient seen lying in bed. Patient returned from acute stay. No acute distress. Wound care done with nurse. No issues or complaints at time. Cont POC Plan to f/u on 6/13 6/13/2925 Patient seen lying in bed. No acute distress. Wound care done with nurse. No issues or complaints at time. Cont POC Plan to f/u on  6/16 6/16/2025 Patient seen lying in bed. No acute distress. Wound care done with nurse. No issues or complaints at time. Cont POC Plan to f/u on  6/17 6/17/2025 Patient seen lying in bed. No acute distress. Wound care done with nurse. No issues or complaints at time. Cont POC     6/23/2025 Patient seen lying in bed. No acute distress. Wound care done with nurse. No issues or complaints at time. Cont POC Plan to f/u on 6/24 6/26/2025 Patient seen lying in bed. No acute distress. Wound care done with nurse. No issues or complaints at time. Cont POC Plan to f/u on 6/27    Past medical history:     Past Medical History:   Diagnosis Date    Benign neoplasm of colon 10/01/2013    Bronchitis chronic     CAD (coronary artery disease) 10/31/2013    Cataract 2008    COPD (chronic obstructive pulmonary disease)     Emphysema of lung     Encounter for preventive health examination 03/21/2018    GERD (gastroesophageal reflux disease)     Glaucoma 2010    Hearing loss 2015    Heart attack 2013    Hx of colonic polyps     Hypertension     NAFLD (nonalcoholic fatty liver disease) 03/27/2017    SHOLA on CPAP     RLS (restless legs syndrome)     Screen for colon cancer 08/30/2013     Past medical history was reviewed and was otherwise negative except as above.    Past surgical history:     Past Surgical History:   Procedure Laterality Date    bilateral foot surgery  1993    CARDIAC CATHETERIZATION  2013    x1    CATARACT  EXTRACTION, BILATERAL      COLON SURGERY  2013    Ace Mott MD    COLONOSCOPY N/A 03/21/2018    Procedure: COLONOSCOPY;  Surgeon: Terry Mott MD;  Location: [facility] ENDO (4TH FLR);  Service: Endoscopy;  Laterality: N/A;    COLONOSCOPY N/A 04/12/2019    Procedure: COLONOSCOPY;  Surgeon: John Mantilla MD;  Location: [facility] ENDO (4TH FLR);  Service: Endoscopy;  Laterality: N/A;    COLONOSCOPY N/A 05/31/2022    Procedure: COLONOSCOPY;  Surgeon: MEDNIA Farris MD;  Location: [facility] ENDO (4TH FLR);  Service: Endoscopy;  Laterality: N/A;  fully vacc-inst portal-tb    ENDOBRONCHIAL ULTRASOUND Bilateral 04/30/2024    Procedure: ENDOBRONCHIAL ULTRASOUND (EBUS);  Surgeon: Naveed Aguero MD;  Location: [facility] OR;  Service: Pulmonary;  Laterality: Bilateral;    EYE SURGERY  2011    scrotal abscess removal      TYMPANOSTOMY TUBE PLACEMENT  2017     Past surgical history was reviewed and was noncontributory except as above.    Allergies:     Review of patient's allergies indicates:   Allergen Reactions    Brilinta [ticagrelor] Itching    Lisinopril      Other reaction(s): cough    Metoprolol succinate Rash     Allergies were reviewed and were negative except as above.    Home Medications:     Prior to Admission medications    Medication Sig Start Date End Date Taking? Authorizing Provider   acetaminophen (TYLENOL) 500 MG tablet Take 500 mg by mouth 2 (two) times daily as needed for Pain.    Provider, Minnie   albuterol (ACCUNEB) 0.63 mg/3 mL Nebu Inhale 3 mLs (0.63 mg total) by nebulization every 6 (six) hours as needed (if symptoms failed to improve with inhaler). Rescue 12/10/24   Bienvenido Ward MD   albuterol (PROVENTIL/VENTOLIN HFA) 90 mcg/actuation inhaler Inhale 2 puffs into the lungs every 4 (four) hours as needed for Wheezing. 12/10/24   Bienvenido Ward MD   amitriptyline (ELAVIL) 25 MG tablet Take 1 tablet (25 mg total) by mouth once daily.  Patient taking differently: Take 25 mg by mouth every  evening. 9/3/24   Sierra Landry MD   aspirin (ECOTRIN) 81 MG EC tablet Take 81 mg by mouth every morning.    Provider, Historical   atorvastatin (LIPITOR) 80 MG tablet Take 1 tablet (80 mg total) by mouth in the morning. 4/21/25   Sierra Landry MD   BETA-CAROTENE,A, W-C & E/MIN (OCUVITE ORAL) Take 1 capsule by mouth once daily.     Provider, Historical   budesonide-glycopyr-formoterol (BREZTRI AEROSPHERE) 160-9-4.8 mcg/actuation HFAA Inhale 2 puffs into the lungs 2 (two) times a day. 12/10/24   Bienvenido Ward MD   dexAMETHasone (DECADRON) 4 MG Tab Take 1 tablet (4 mg total) by mouth 2 (two) times daily.  Patient not taking: Reported on 5/16/2025 3/26/25   Bienvenido Henson MD   echinacea 400 mg Cap Take 1 capsule by mouth once daily.     Provider, Historical   fluticasone propionate (FLONASE) 50 mcg/actuation nasal spray Spray 2 sprays (100 mcg total) in Each Nostril once daily. 10/23/24   Carne Shah MD   furosemide (LASIX) 20 MG tablet Take 1 tablet (20 mg total) by mouth once daily. 4/15/25 4/15/26  Bienvenido Ward MD   ibuprofen (ADVIL,MOTRIN) 200 MG tablet Take 200 mg by mouth every 8 (eight) hours as needed for Pain. 2/21/25   Provider, Historical   latanoprost 0.005 % ophthalmic solution Instill 1 drop into both eyes every night at bedtime 1/30/25      levETIRAcetam (KEPPRA) 500 MG Tab Take 1 tablet (500 mg total) by mouth 2 (two) times daily. 3/24/25 3/24/26  Janene Nelson PA-C   losartan (COZAAR) 100 MG tablet Take 1 tablet (100 mg total) by mouth once daily. 12/19/24   Prince Ba MD   nitroGLYCERIN (NITROSTAT) 0.4 MG SL tablet Place 1 tablet (0.4 mg total) under the tongue every 5 (five) minutes as needed. 5/6/24   Prince Ba MD   omeprazole (PRILOSEC) 20 MG capsule Take 20 mg by mouth once daily.    Provider, Historical   polyethylene glycol (GLYCOLAX) 17 gram/dose powder Take 17 grams by mouth every day for constipation prevention 5/20/25      roflumilast  "(DALIRESP) 500 mcg Tab Take 500 mcg by mouth every morning. 25   Provider, Historical   sennosides (SENNA ORAL) Take 1 tablet by mouth daily as needed (Constipation).    Provider, Historical   traZODone (DESYREL) 50 MG tablet Take 1 tablet (50 mg total) by mouth every evening. 25   Lou Muro NP       Family History:     Family History   Problem Relation Name Age of Onset    Heart disease Mother jc edutsch     Heart attack Mother jc deutsch     Hypertension Mother jc deutsch     No Known Problems Sister      No Known Problems Daughter 2     Diabetes Maternal Grandmother imtiaz jennings     Asthma Neg Hx      Emphysema Neg Hx      Prostate cancer Neg Hx      Cirrhosis Neg Hx       Family history was reviewed and was otherwise negative except as above.    Social History:   Social History[1]  Social history was reviewed and was otherwise negative except as above    Review of Systems   A 10 point review of systems was conducted and was negative except as described in the HPI.  Patient denies Chest Pain.  Patient denies shortness of breath.  Patient denies fevers.  Patient denies chills.    Physical Examination:   Triage: BP: 123/77  Pulse: 101  Temp: 97.6 °F (36.4 °C)  Resp: 18  Height: 5' 9" (175.3 cm)  Weight: 86.8 kg (191 lb 5.8 oz) (witness by SALO Escalante and C.C. RT.)  BMI (Calculated): 28.2  SpO2: 97 %  Exam: /63 (BP Location: Left arm, Patient Position: Lying)   Pulse 99   Temp 98.1 °F (36.7 °C) (Oral)   Resp (!) 23   Ht 5' 9" (1.753 m)   Wt 86.6 kg (190 lb 14.7 oz)   SpO2 (!) 90%   BMI 28.19 kg/m²     Vitals  BP  Min: 102/58  Max: 146/79  Temp  Av.9 °F (36.6 °C)  Min: 97.6 °F (36.4 °C)  Max: 98.2 °F (36.8 °C)  Pulse  Av.2  Min: 82  Max: 128  Resp  Av.2  Min: 13  Max: 30  SpO2  Av.3 %  Min: 90 %  Max: 100 %  Weight  Av.6 kg (190 lb 14.7 oz)  Min: 86.6 kg (190 lb 14.7 oz)  Max: 86.6 kg (190 lb 14.7 oz)    General Pt alert and oriented, not in apparent " distress, good nutrition  Eyes: No conjunctival erythema; normal appearing lids  HEENT: Normocephalic atraumatic, mucous membranes moist  Cardiovascular: No edema  Respiratory: Non-labored, no accessory muscle use, no wheezing  Abdomen: Soft, non tender, non distended, no rebound  Musculoskeletal: no clubbing or cyanosis of digits  Neuro: Grossly intact, weakness upper/lower extremities  Psych: Good mood, full affect, good insight  Skin:  06/23/25 1300         Wound 06/09/25 1313 Other (comment) Nose   Date First Assessed/Time First Assessed: 06/09/25 1313   Present on Original Admission: Yes  Primary Wound Type: (c) Other (comment)  Location: Nose   Wound Image    Dressing Appearance Open to air   Drainage Amount None   Drainage Characteristics/Odor No odor   Appearance Pink   Red (%), Wound Tissue Color 100 %   Periwound Area Dry   Wound Length (cm) 0 cm   Wound Width (cm) 0 cm   Wound Depth (cm) 0 cm   Wound Volume (cm^3) 0 cm^3   Wound Surface Area (cm^2) 0 cm^2   Dressing    (open to air)     Wound 06/18/25 1502 Rash Perineum   Date First Assessed/Time First Assessed: 06/18/25 1502   Present on Original Admission: Yes  Primary Wound Type: Rash  Location: Perineum   Wound Image                Distribution    (open to air, no rash)     Laboratory:  Most Recent Data:  CBC:   Lab Results   Component Value Date    WBC 12.53 06/25/2025    HGB 9.9 (L) 06/25/2025    HCT 30.7 (L) 06/25/2025     (L) 06/25/2025    MCV 90 06/25/2025    RDW 17.7 (H) 06/25/2025     BMP:   Lab Results   Component Value Date     06/25/2025    K 3.7 06/25/2025     06/25/2025    CO2 29 06/25/2025    BUN 29 (H) 06/25/2025    CREATININE 0.8 06/25/2025     (H) 06/25/2025    CALCIUM 8.6 (L) 06/25/2025    MG 2.0 06/25/2025    PHOS 2.7 06/25/2025     LFTs:   Lab Results   Component Value Date    PROT 5.1 (L) 06/25/2025    ALBUMIN 2.0 (L) 06/25/2025    BILITOT 0.6 06/25/2025    AST 20 06/25/2025    ALKPHOS 95 06/25/2025    ALT  "55 (H) 06/25/2025    GGT 80 (H) 06/22/2025     Coags:   Lab Results   Component Value Date    INR 1.0 02/07/2025     FLP:   Lab Results   Component Value Date    CHOL 101 (L) 02/07/2025    HDL 35 (L) 02/07/2025    LDLCALC 53.2 (L) 02/07/2025    TRIG 64 02/07/2025    CHOLHDL 34.7 02/07/2025     DM:   Lab Results   Component Value Date    HGBA1C 5.7 (H) 03/06/2024    HGBA1C 5.6 10/12/2023    HGBA1C 5.8 05/30/2013    GLUF 103 05/19/2014    LDLCALC 53.2 (L) 02/07/2025    CREATININE 0.8 06/25/2025     Thyroid:   Lab Results   Component Value Date    TSH 0.261 (L) 06/01/2025    FREET4 0.99 06/01/2025    B1BVAPL 9.1 03/06/2024    E8PRMQU 86 03/07/2017     Anemia:   Lab Results   Component Value Date    IRON 21 (L) 06/18/2025    TIBC 157 (L) 06/18/2025    FERRITIN 2,096.0 (H) 06/18/2025    [id number] 474 08/29/2024    FOLATE 4.5 08/29/2024     Cardiac:   Lab Results   Component Value Date    TROPONINI 10.800 (H) 06/08/2025    BNP 63 06/18/2025     Urinalysis:   Lab Results   Component Value Date    COLORU Yellow 06/09/2025    SPECGRAV 1.010 06/09/2025    NITRITE Negative 06/09/2025    KETONESU Trace (A) 02/07/2025    UROBILINOGEN Negative 06/09/2025    WBCUA 4 06/09/2025       Trended Lab Data:  Recent Labs   Lab 06/22/25  0759 06/23/25  0351 06/23/25  0520 06/25/25  0415   WBC 11.22 11.91  --  12.53   HGB 10.3* 10.2*  --  9.9*    206  --  133*   MCV 91 91  --  90   RDW 17.2* 17.4*  --  17.7*     --  143 139   K 4.5  --  4.6 3.7     --  107 103   CO2 27  --  27 29   BUN 35*  --  35* 29*   GLU 90  --  159* 134*   CALCIUM 9.7  --  9.2 8.6*   PROT 5.6*  --  5.3* 5.1*   ALBUMIN 2.3*  --  2.1* 2.0*   BILITOT 0.5  --  0.6 0.6   AST 48*  --  42 20   ALKPHOS 112  --  120 95   ALT 95*  --  89* 55*       Trended Cardiac Data:  No results for input(s): "TROPONINI", "CKTOTAL", "CKMB", "BNP" in the last 168 hours.      Micro Results  No components found for: "CBLOOD"  No components found for: "CURINE"  No components " "found for: "RU"    Radiology:  US Lower Extremity Veins Bilateral  Result Date: 6/18/2025  EXAMINATION: US LOWER EXTREMITY VEINS BILATERAL CLINICAL HISTORY: r/o dvt; TECHNIQUE: Duplex and color flow Doppler and dynamic compression was performed of the bilateral lower extremity veins was performed. COMPARISON: 05/22/2025 FINDINGS: Duplex and color flow Doppler evaluation does not reveal any evidence of acute venous thrombosis in the common femoral, superficial femoral, greater saphenous, popliteal, peroneal, anterior tibial and posterior tibial veins bilaterally.  There is no reflux to suggest valvular incompetence.  Please note, there is scattered slow flow throughout the bilateral lower extremities.     No evidence of deep venous thrombosis in either lower extremity. Electronically signed by: Artur Velazquez MD Date:    06/18/2025 Time:    16:02    CT Chest Without Contrast  Result Date: 6/18/2025  EXAMINATION: CT CHEST WITHOUT CONTRAST CLINICAL HISTORY: Pneumonia, unresolved; TECHNIQUE: Using low dose technique the chest was surveyed from above the pulmonary apices through the posterior costophrenic angles.  Data was reconstructed for multiplanar images in axial, sagittal and coronal planes and for maximal intensity projection images in the the axial, sagittal and coronal planes. COMPARISON: Multiple priors, most recent chest x-ray 06/18/2025 and CTA 06/10/2025 FINDINGS: Support tubes and lines: None. Aorta: Mild ectasia of the ascending aorta measuring 4.1 cm, similar to prior.  Mild atherosclerosis.  Aberrant right subclavian artery. Heart: Normal size. Coronary arteries: Multi-vessel calcific atherosclerosis of the coronary arteries. Pericardium: Trace pericardial effusion. Central pulmonary arteries: Normal caliber. Base of neck/thyroid: Unremarkable. Lymph nodes: No supraclavicular, axillary, internal mammary, mediastinal, or hilar adenopathy. Esophagus: Unremarkable. Pleura: Bilateral small pleural " effusions with adjacent compressive atelectasis, new from prior. Upper abdomen: Fatty atrophy of the pancreas, unchanged.  Accessory splenules. Body wall: Unremarkable. Airways: Unremarkable. Lungs: Paraseptal and centrilobular emphysema.  Right and left ground-glass opacities, mostly affecting the lower lobes, new from prior.  Treatment changes of the left hilar region with associated volume loss and architectural distortion.  Stable 4 mm nodule in the left upper lobe. Bones: Degenerative changes of the spine.  Unchanged sclerotic focus in the posterior elements of T9.  Height loss of the superior endplate of L1, unchanged.     Interval development of bilateral small pleural effusions. Right and left ground-glass opacities, new from prior, concerning for progression of infectious process. Additional findings as above. Electronically signed by resident: Madeline Iniguez Date:    06/18/2025 Time:    10:07 Electronically signed by: Geovanny Dennis Date:    06/18/2025 Time:    11:16    X-Ray Chest AP Portable  Result Date: 6/18/2025  EXAMINATION: XR CHEST AP PORTABLE CLINICAL HISTORY: Chest Pain; TECHNIQUE: Single frontal view of the chest was performed. COMPARISON: 2025 FINDINGS: Similar perihilar and lower lobe infiltrates within both lungs.  Findings suggestive of pneumonia versus pulmonary edema depending upon clinical scenario.  Stable appearance of the cardiomediastinal contours.  Probable small bilateral pleural effusions.     See above Electronically signed by: Solitario Rivas Date:    06/18/2025 Time:    08:11    X-Ray Chest 1 View  Result Date: 6/17/2025  EXAMINATION: XR CHEST 1 VIEW CLINICAL HISTORY: worsening hypoxia; FINDINGS: Chest one view: Heart size is normal.  There is perihilar and lower lobe edema and/or infiltrate.  Bones bowel gas are noncontributory.  There is emphysema.     Pulmonary edema versus pneumonia. Emphysema. Electronically signed by: Alexander Blankenship MD Date:    06/17/2025  Time:    07:58    Echo  Result Date: 6/11/2025    Left Ventricle: The left ventricle is normal in size. Ventricular mass is normal. Normal wall thickness. There is low normal systolic function with a visually estimated ejection fraction of 50 - 55%.   Inferolateral pseudodyskinesis   Right Ventricle: Right ventricle was not well visualized due to poor acoustic window. The right ventricle is normal in size Systolic function is mildly reduced.   IVC/SVC: Intermediate venous pressure at 8 mmHg.   RV function not well seen.     CTA Chest Non-Coronary (PE Studies)  Result Date: 6/10/2025  EXAMINATION: CTA CHEST NON CORONARY (PE STUDIES) CLINICAL HISTORY: r/o PE, signifcant tachycardia; TECHNIQUE: Low dose axial images, sagittal and coronal reformations were obtained from the thoracic inlet to the lung bases following the IV administration of 100 mL of Omnipaque 350.  Contrast timing was optimized to evaluate the pulmonary arteries.  MIP images were performed. COMPARISON: None FINDINGS: Pulmonary arteries:Pulmonary arteries are adequately enhanced.No filling defects to indicate pulmonary embolism. Thoracic soft tissues: Unremarkable. Aorta: Mild aneurysmal dilation of the ascending thoracic aorta to approximately 4.2 cm axial 256 of series 2. Heart: Normal size. No effusion. Nathalie/Mediastinum: No pathologic carolyn enlargement. Airways: Patent. Lungs/Pleura: Prominent emphysematous changes throughout the lungs. Mild interstitial infiltrate or scarring posterior right upper lobe. Mild left basilar atelectasis. Minimal left upper lobe ground-glass infiltrate. Esophagus: Unremarkable. Upper Abdomen: No abnormality of the partially imaged upper abdomen. Bones: Moderate chronic compression fracture superior endplate of L1. Aberrant right subclavian artery noted as an anatomic variant.     1. No evidence of pulmonary embolism. 2. Mild aneurysm dilation of the ascending thoracic aorta to approximately 4.2 cm.  Recommend  surveillance. 3. Emphysematous changes of the lungs. 4. Mild interstitial infiltrate or scarring in the posterior right upper lobe and minimal left upper lobe infiltrate.  Minimal pneumonitis is not excluded. 5. Aberrant right subclavian artery noted as an anatomic variant. 6. Moderate chronic compression fracture of the superior endplate of L1 Electronically signed by: Ryan Jackson Date:    06/10/2025 Time:    21:38    CTA Chest Non-Coronary (PE Studies)  Result Date: 6/9/2025  EXAMINATION: CTA CHEST NON CORONARY (PE STUDIES) CLINICAL HISTORY: Pulmonary embolism (PE) suspected, high prob; TECHNIQUE: During intravenous bolus injection of 100 ml of Omnipaque 350 contrast medium and using low dose technique, the chest was surveyed from above the pulmonary apices through the posterior costophrenic angles data was reconstructed for multiplanar images in axial, sagittal and coronal planes and for maximal intensity projection images in the the axial, sagittal and coronal planes. COMPARISON: Multiple priors, most recent CTA 05/22/2025 FINDINGS: Exam quality: Satisfactory. Pulmonary embolism: No acute or chronic pulmonary embolism. Central pulmonary arteries: Normal caliber. Aorta: Normal caliber.  Mild atherosclerosis.  Aberrant right subclavian artery. Heart: No right heart strain. Normal size. Coronary arteries: Multi-vessel calcific atherosclerosis of the coronary arteries. Pericardium: Normal. No effusion, thickening, or calcification. Support tubes and lines: None. Base of neck/thyroid: Normal. Lymph nodes: No supraclavicular, axillary, internal mammary, mediastinal, or hilar adenopathy. Esophagus: Normal. Pleura: No effusion, thickening, or calcification. Upper abdomen: Accessory splenule.  Fatty atrophy of the pancreas. Body wall: Unremarkable Airways: Normal. Lungs: Centrilobular and paraseptal emphysema.  Treatment changes of the left hilar region with associated volume loss and architectural distortion, stable  when compared to prior imaging.  Right bandlike scarring versus atelectasis.  Left lower lobe calcified granuloma.  Ill-defined airspace opacities present in the dependent portions of the right upper and middle lobes are new when compared to 05/22/2025.  A 4 mm nodule in the left upper lobe (series 3, image 257) is unchanged from 05/22/2025 but new when compared to 03/05/2025. Bones: Degenerative changes of the spine.  Height loss of the superioror endplate of L1, unchanged from 02/20/2025.  Unchanged sclerotic focus in the posterior elements of T9.     No evidence of acute pulmonary embolism. New mild ill-defined airspace opacities in the dependent portions of the right upper and middle lobes when compared to 05/22/2025, nonspecific.  This could be due to atelectasis, aspiration and/or pneumonia. 5 mm left upper lobe nodule, unchanged from 05/22/2025 but new when compared to 03/05/2025.  A follow-up chest CT in 1 year could be considered if the patient is at increased risk of developing lung cancer, such as from a smoking history. Additional findings as above. Electronically signed by resident: Madeline Iniguez Date:    06/09/2025 Time:    10:55 Electronically signed by: Anna Brown Date:    06/09/2025 Time:    11:55    X-Ray Chest AP Portable  Result Date: 6/9/2025  EXAMINATION: XR CHEST AP PORTABLE CLINICAL HISTORY: sob; TECHNIQUE: Single frontal view of the chest was performed. COMPARISON: 06/01/25 FINDINGS: Cardiac size is normal.  Lungs are clear with no significant infiltrate or vascular congestion.  No pleural fluid is seen.     See above Electronically signed by: Prince Ovalle MD Date:    06/09/2025 Time:    09:29    X-Ray Chest AP Portable  Result Date: 6/1/2025  EXAMINATION: XR CHEST AP PORTABLE CLINICAL HISTORY: palpitations; TECHNIQUE: Single frontal view of the chest was performed. COMPARISON: 05/26/2025. FINDINGS: The lungs are well expanded and clear. No focal opacities are seen. The pleural  spaces are clear. The cardiac silhouette is unremarkable. The visualized osseous structures are unremarkable.     No acute abnormality. Electronically signed by: Popeye Mckeon Date:    06/01/2025 Time:    22:33      No results found for this or any previous visit (from the past 24 hours).    A1C   Lab Results   Component Value Date    HGBA1C 5.7 (H) 03/06/2024         Assessment/Plan:       MASD to Sacrum and Buttocks   Wound care clean with bath wipes apply Triad daily  Turn patient every 2 hours      Cellulitis to Left lower leg -resolved  Dermatitis to left foot -resolved   -wash with bath wipes apply lac-hydrin daily per bedside nurse     Rash to:  RIGHT & LEFT GROIN-resolved   RIGHT & LEFT ABDOMEN-resolved   -clean with bath wipes apply antifungal powder daily per bedside nurse     Pressure wound stage I to right top ankle -resolved  -leave JOSSUE monitor     Decreased Mobility   -Patient with decreased bed mobility therefore patient is at high risk for developing wounds and deterioration of any current wounds. Patient needs frequent turning.     Nutrition :  Promote good nutrition to for improved wound healing.      Off-loading:   Follow facility bed policy for proper bed surface   Offload heels per facility protocol   Turn every 2 hours   Use Wheelchair Cushion     Patient Treatment Goals:  Short Term Goals  The wound base will be 25% .,demonstrate 25% more granulation tissue.  Short Term Goals  Patient wound status will improve/maintain without exacerbation infection of deterioration.  Wound Healing  Patient will have a decrease in wound size by 20% in 4 weeks.  Clinical Goals  Prevention of Infection is important for wound healing  Functional Goals:  The patient will achieve proper turning schedule.    -cont medical management     High Risk Because  -Chronic illness with SEVERE exacerbation, progression, or side effects of treatment.  -Acute or chronic illness or injury that poses a threat to life or  bodily function    Notify Sheree Tao NP, or wound care RN if any new issues arise or if there is deterioration in documented wounds.    Sheree Tao FNP-C                         [1]   Social History  Tobacco Use    Smoking status: Former     Current packs/day: 0.00     Average packs/day: 1 pack/day for 40.0 years (40.0 ttl pk-yrs)     Types: Cigarettes     Start date: 8/15/1972     Quit date: 8/15/2012     Years since quittin.8     Passive exposure: Never    Smokeless tobacco: Never   Substance Use Topics    Alcohol use: Yes     Alcohol/week: 3.0 standard drinks of alcohol     Types: 3 Cans of beer per week     Comment: maybe 2-3  beers weekly    Drug use: No

## 2025-06-27 NOTE — PLAN OF CARE
Problem: Breathing Pattern Ineffective  Goal: Effective Breathing Pattern  Outcome: Progressing     Problem: Airway Clearance Ineffective  Goal: Effective Airway Clearance  Outcome: Progressing     Problem: Gas Exchange Impaired  Goal: Optimal Gas Exchange  Outcome: Progressing     Problem: Noninvasive Ventilation Acute  Goal: Effective Unassisted Ventilation and Oxygenation  Outcome: Progressing

## 2025-06-27 NOTE — RESPIRATORY THERAPY
Patient had an uneventful night. He was placed on his home CPAP with 5 lpm bled into it at 2221. As of this note is is asleep with the CPAP in place. Patient tolerated all respiratory therapies without any adverse reactions. Will continue to monitor for the remainder of this shift.

## 2025-06-27 NOTE — PROGRESS NOTES
[facility] Medicine   Progress Note  Room: 207/207   Patient Name: Jordin Allen Jr.  MRN: [id number]  Admit Date: 6/3/2025   Length of Stay:  LOS: 24 days   Attending Physician: Bernardino Guthrie MD  Nurse Practitioner: Ernestine Palomino NP    Date of Service: 06/27/2025    Principal Problem:    Acute on chronic respiratory failure with hypoxia    Brief HPI:     Jordin Allen Jr. is a 79 y.o. 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  He presented to [facility] [place] [place] ED on 01/22/2025 with complaints of shortness of breath and left foot redness.  Admitted to hospital medicine team for left foot cellulitis and acute hypoxic respiratory failure secondary to pneumonia/COPD exacerbation. CTA chest negative for PE. Emphysematous change with superimposed edema. Nodular focus within the anterior aspect of left upper lobe.  Started on nebs, prednisone, and empiric vanc/cefepime and doxycycline.  Respiratory panel positive for parainfluenza virus.  Course further complicated by sinus tach with PACs/18, for which he was started on diltiazem.  Blood cultures positive for staph, suspected to be contaminant.  Repeat blood cultures returned negative.  He will switch to Augmentin and doxy.  Ongoing episodes of SVT, thought to be due to underlying hypoxia.  He was able to be weaned down to high-flow nasal cannula 15 L.  Discharged to [facility] on 06/03/2025 for rehab, wound care, and O2 weaning.     6/9-6/11-sent out to [facility] [place] [place] for concerns of MI. Workup revealed findings concerning for pneumonia on CT. Started on empiric vanc and zosyn. Also started on 5 day course of prednisone for COPD exacerbation.    6/18 - 6/22-sent out to [facility] [place] [place] again for increasing O2 requirements and worsening AFib with RVR on diltiazem drip.  Use switch to amiodarone drip at [facility] [place] [place],  "and transitioned to p.o. amiodarone successfully.  Infectious workup revealed concerns for aspiration pneumonia.  He was treated with vanc, cefepime, and levofloxacin.  Antibiotics de-escalated to oral levofloxacin.  He was weaned from BiPAP to nasal cannula at 3-5 L.      Interval History    Reports feeling constipated, also with poor appetite   Giving suppository today and will also obtain KUB   Reports he feels that he is unable to "push" his stool out due to shortness of breath  Labs reviewed and listed below, no critical values   Worsening thrombocytopenia today with platelets down to 81   Will discontinue daily aspirin and mirtazapine  Repeat CBC in a.m.  On 5 L nasal cannula this morning with O2 sats 94%  No BM with suppository, given fleets enema with large amount of stool output. Reports relief        Subjective:     Review of Systems   Constitutional:  Positive for malaise/fatigue.   Respiratory:  Negative for cough, sputum production and shortness of breath.    Cardiovascular:  Negative for chest pain and leg swelling.   Gastrointestinal:  Negative for heartburn, nausea and vomiting.   Genitourinary:  Negative for dysuria and urgency.   Musculoskeletal:  Positive for joint pain (R shoulder). Negative for myalgias.   Neurological:  Positive for weakness. Negative for dizziness.   Psychiatric/Behavioral:  Negative for depression. The patient does not have insomnia.          Objective:     Vital Sign Range  Temp:  [97.7 °F (36.5 °C)-98.2 °F (36.8 °C)]   Pulse:  []   Resp:  [16-35]   BP: ()/(54-77)   SpO2:  [89 %-100 %]     Body mass index is 29.17 kg/m².  Oxygen Concentration (%):  [38-40] 40        Intake/Output Summary (Last 24 hours) at 6/27/2025 1025  Last data filed at 6/27/2025 0951  Gross per 24 hour   Intake 977 ml   Output 250 ml   Net 727 ml       Physical Exam  Constitutional:       Appearance: He is ill-appearing.   HENT:      Head: Normocephalic and atraumatic.      Mouth/Throat:     "  Mouth: Mucous membranes are moist.      Pharynx: Oropharynx is clear.   Eyes:      Extraocular Movements: Extraocular movements intact.      Pupils: Pupils are equal, round, and reactive to light.   Cardiovascular:      Rate and Rhythm: Normal rate. Rhythm irregular.   Pulmonary:      Breath sounds: Wheezing and rhonchi present.   Abdominal:      General: Bowel sounds are normal.      Palpations: Abdomen is soft.   Musculoskeletal:      Right lower leg: Edema present.      Left lower leg: Edema present.   Skin:     General: Skin is warm and dry.   Neurological:      Mental Status: He is alert and oriented to person, place, and time. Mental status is at baseline.   Psychiatric:         Mood and Affect: Affect is flat.         Wounds       Wound 05/19/25 0233 Moisture associated dermatitis Left dorsal Foot (Active)   05/19/25 0233 Foot   Present on Original Admission: Y   Primary Wound Type: Moisture ass   Side: Left   Orientation: dorsal        Wound 05/19/25 0231 Moisture associated dermatitis Left lateral Foot (Active)   05/19/25 0231 Foot   Present on Original Admission: Y   Primary Wound Type: Moisture ass   Side: Left   Orientation: lateral        Wound 05/19/25 0231 Pressure Injury Right anterior Ankle (Active)   05/19/25 0231 Ankle   Present on Original Admission: Y   Primary Wound Type: Pressure inj   Side: Right   Orientation: anterior        Wound 05/19/25 0233 Moisture associated dermatitis Left anterior Foot (Active)   05/19/25 0233 Foot   Present on Original Admission:    Primary Wound Type: Moisture ass   Side: Left   Orientation: anterior        Wound 06/04/25 1030 Moisture associated dermatitis Right lateral Abdomen (Active)   06/04/25 1030 Abdomen   Present on Original Admission: Y   Primary Wound Type: Moisture ass   Side: Right   Orientation: lateral        Wound 06/04/25 1030 Moisture associated dermatitis Left lateral Abdomen (Active)   06/04/25 1030 Abdomen   Present on Original Admission: Y  "  Primary Wound Type: Moisture ass   Side: Left   Orientation: lateral        Wound 06/04/25 1030 Moisture associated dermatitis Right Groin (Active)   06/04/25 1030 Groin   Present on Original Admission: Y   Primary Wound Type: Moisture ass   Side: Right        Wound 06/04/25 1030 Moisture associated dermatitis Left Groin (Active)   06/04/25 1030 Groin   Present on Original Admission: Y   Primary Wound Type: Moisture ass   Side: Left        Wound 06/04/25 1030 Moisture associated dermatitis Sacral spine (Active)   06/04/25 1030 Sacral spine   Present on Original Admission: Y   Primary Wound Type: Moisture ass         Wounds consistently followed by [facility] NP Sheree Tao     Labs:  Recent Labs   Lab 06/23/25  0351 06/25/25  0415 06/27/25  0445   WBC 11.91 12.53 8.77   HGB 10.2* 9.9* 9.7*   HCT 33.3* 30.7* 30.3*    133* 81*     Recent Labs   Lab 06/23/25  0520 06/25/25  0415 06/27/25  0445    139 137   K 4.6 3.7 3.7    103 101   CO2 27 29 30*   BUN 35* 29* 20   CREATININE 0.9 0.8 0.7   * 134* 106   CALCIUM 9.2 8.6* 8.4*   MG 2.1 2.0 2.0   PHOS 2.4* 2.7 2.3*     Recent Labs   Lab 06/23/25  0520 06/25/25  0415 06/27/25  0445   ALKPHOS 120 95 89   ALT 89* 55* 36   AST 42 20 17   ALBUMIN 2.1* 2.0* 1.7*   PROT 5.3* 5.1* 5.0*   BILITOT 0.6 0.6 0.8       No results for input(s): "POCTGLUCOSE" in the last 72 hours.      Meds Scheduled:   amiodarone  400 mg Oral BID    Followed by    [START ON 7/1/2025] amiodarone  200 mg Oral Daily    amitriptyline  25 mg Oral QHS    ammonium lactate   Topical (Top) Daily    apixaban  5 mg Oral BID    aspirin  81 mg Oral QAM    atorvastatin  80 mg Oral Daily    budesonide  0.5 mg Nebulization Q12H    fluticasone propionate  2 spray Each Nostril Daily    guaiFENesin  1,200 mg Oral BID    levalbuterol  1.2495 mg Nebulization Q6H    levETIRAcetam  500 mg Oral BID    mirtazapine  15 mg Oral QHS    pantoprazole  40 mg Oral Daily    roflumilast  500 mcg Oral " Daily    sodium chloride 3%  4 mL Nebulization BID    tiotropium bromide  2 puff Inhalation Daily       Current Inpatient Problem List:  Active Hospital Problems    Diagnosis  POA    *Acute on chronic respiratory failure with hypoxia [J96.21]  Yes    Parainfluenza virus infection [B34.8]  Yes    Pneumonia of right lung due to infectious organism [J18.9]  Yes    Cellulitis [L03.90]  Yes    TIA (transient ischemic attack) [G45.9]  Yes     ASA and statin      Metastasis to brain [C79.31]  Yes    Adenocarcinoma, lung, 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       CAD (coronary artery disease) [I25.10]  Yes     Chronic     -LHC (10/31/13) for stemi: pLAD 100%, Cx with Li's, mRCA 40%, LVEF 55%,. LVEDP 15   -Xience 3.0 x 33 mm to pLAD, post dilated with 3.5 NC and 4.0 NC.   -TTE (8/14/13): LVEF 55%, grade I diastolic dysfunction      HTN (hypertension), benign [I10]  Yes    SHOLA (obstructive sleep apnea) [G47.33]  Yes     CPAP at night      GERD (gastroesophageal reflux disease) [K21.9]  Yes     Continue PPI        Resolved Hospital Problems   No resolved problems to display.         Assessment / Plan:     Acute respiratory failure with hypoxia  COPD with acute exacerbation  Parainfluenza virus infection  Pneumonia   On Symbicort Spiriva and Daliresp at home. Follows up with pulmonology outpatient.   Completed 7 day course of Augmentin and doxycycline on 06/01  Continue Daliresp 500mcg daily  Continue Xopenex p.r.n.   Continue weaning high-flow nasal cannula  Continue guaifenesin 1200 mg b.i.d.  Completed 7 day course of levofloxacin on 6/25  Pulmonology consulted, appreciate recommendations  On 5 L nasal cannula this morning with O2 sats 94%    Constipation, new   Reports feeling constipated, also with poor appetite   Giving suppository today and will also obtain KUB    Last Bowel Movement: 06/25/25      Thrombocytopenia, new   Worsening thrombocytopenia today with platelets down to 81   Will discontinue daily aspirin and mirtazapine  Repeat CBC in a.m.    Chest pain, new   Resolved   Evaluated by cardiology while in the hospital  Will need outpatient follow up with Dr. Ba with Cardiology     Cellulitis   Wound followed and managed by consistent, contracted [facility] NP  Completed 7 day course of doxy and Augmentin  Will need follow up with Podiatry after discharge     Restless leg syndrome   Continue amitriptyline 25 mg nightly     Adenocarcinoma, lung, left   Metastasis to brain  Completed treatment with chemo/XRT. Brain XRT for lesionsx2. off immunotherapy since 12/24  suspect the LLL area was consolidation of prior RXT changes and not malignancy and is nearly resolved.  CT on presentation with new SANJAY nodule, need op follow up with pulm  Continue prophylactic Keppra 500 mg b.i.d.    Right shoulder pain  Having some shoulder pain to his right shoulder, which seems to be some muscle spasms.    Started Robaxin 500 mg q.i.d. p.r.n. on 6/6    Compression fracture of L1 vertebrae with routine healing   TLSO brace when out of bed  F/u with Dr. Hopkins     Dyslipidemia  TIA  CAD   Holding aspirin due to thrombocytopenia  Continue atorvastatin 80 mg daily     Tachycardia   Episodes of SVT while at the hospital   Evaluated by Cardiology   Likely worsened by underlying hypoxia   Supplemental O2   Uncontrolled AFib with RVR on 06/16, he was placed on diltiazem drip though rate remained difficult to control  [place] [facility] [place] started on amiodarone for rate control   Continue amiodarone 400 mg b.i.d. for 8 days, then transition to amiodarone 200 mg daily       SHOLA   Continue CPAP nightly     GERD  Continue PPI daily     Left lateral foot wound   Right anterior ankle wound   Traumatic left dorsal foot wound   Left anterior foot venous ulcer   Skin tear right distal arm  "  Pressure injury nose  Wound followed and managed by consistent, contracted [facility] NP        ACP 6/19  "Dr. Zaldivar and I held discussion with the patient regarding his goals of care. The patient states that he would want to be intubated for mechanical ventilation if he were to decompensate but in the event of a cardiac arrest, he would not want CPR and would want to be allowed to die peacefully." Tommy Argueta NP  Code status changed to DNR     Psychiatric Assessment  Patient Health Questionnaire-9 (PHQ-9)    Date:  06/05/2025  Total Score: 15  Screening is Positive   Diagnosis and Treatment Plan:  Moderate MDD   Continue amitriptyline 25 mg nightly   Increasing mirtazapine to 15 mg nightly, which will hopefully help with his appetite as well       Diet:  Cardiac   GI Ppx:  PPI   DVT Ppx:  Enoxaparin     Lines:  PIV   Drains:  None   Airways:n/a   Wounds:  Multiple    Goals of Care:   Restorative,  Treat infection  Optimize nutrition  Wound healing  Muscle strengthening  Restoration of ADL's  Improve mobility    Anticipated Disposition:    TBD    Follow up appointments:   Future Appointments   Date Time Provider Department Center   7/1/2025 10:30 AM [facility] OIC-XRAY [facility]   7/1/2025 11:30 AM Bolivar Sr PA [facility] NEUROS8 [facility]   7/8/2025  9:15 AM MEEKS, VISUAL FIELDS [facility] OPHTHAL [facility]   7/8/2025 10:00 AM Cara Looney MD [facility] OPHTHAL [facility]   7/15/2025 10:00 AM Garrett Lloyd DPM N[facility] POD [facility] Ort   7/23/2025 10:15 AM CV [facility] ECHO [facility] CARDIA [place]   7/30/2025  8:30 AM [facility] OIC-MRI1 [facility] MRI [facility]   7/30/2025 10:00 AM Bienvenido Henson MD [facility] RAD ONC [facility]   8/15/2025  9:20 AM Bienvenido Ward MD [facility] PULMON [place]   8/27/2025  9:00 AM Guido Trejo MD [facility] ENT [facility]   9/11/2025  8:30 AM Yan Palmer MD [facility] DERM Alvarez Hwcharisse Palomino, NP  [facility]    I have spent 56 " minutes reviewing patient records, examining, and  of the patient/ patient's family with greater than 50% of the time spent with direct patient care and coordination.

## 2025-06-27 NOTE — CARE UPDATE
06/26/25 1900   Patient Assessment/Suction   Level of Consciousness (AVPU) alert   Respiratory Effort Normal;Unlabored   Expansion/Accessory Muscles/Retractions expansion symmetric;no retractions;no use of accessory muscles   All Lung Fields Breath Sounds diminished;clear   Rhythm/Pattern, Respiratory depth regular;pattern regular;unlabored   Cough Frequency no cough   PRE-TX-O2   Device (Oxygen Therapy) nasal cannula with humidification   $ Is the patient on Low Flow Oxygen? Yes   Flow (L/min) (Oxygen Therapy) (S)  5  (FOUND PATIENT ON 5 LPM)   Oxygen Concentration (%) 40   SpO2 100 %   Pulse Oximetry Type Continuous   $ Pulse Oximetry - Multiple Charge Pulse Oximetry - Multiple   Pulse 100   Resp 18   Aerosol Therapy   $ Aerosol Therapy Charges Aerosol Treatment   Daily Review of Necessity (SVN) completed   Respiratory Treatment Status (SVN) given   Treatment Route (SVN) mask   Patient Position HOB elevated   Post Treatment Assessment (SVN) breath sounds unchanged   Signs of Intolerance (SVN) none   Breath Sounds Post-Respiratory Treatment   Throughout All Fields Post-Treatment All Fields   Throughout All Fields Post-Treatment no change   Post-treatment Heart Rate (beats/min) 100   Post-treatment Resp Rate (breaths/min) 18   Vibratory PEP Therapy   $ Vibratory PEP Charges Acapella Therapy   $ Vibratory PEP Tech Time Charges 15 min   Daily Review of Necessity (PEP Therapy) completed   Type (PEP Therapy) vibratory/oscillatory   Device (PEP Therapy) Acapella low (blue)   Route (PEP Therapy) mouthpiece;proper technique demonstrated   Breaths per Cycle (PEP Therapy) 10   Cycles (PEP Therapy) 1   Settings (PEP Therapy) PEP 5   Patient Position (PEP Therapy) HOB elevated   Post Treatment Assessment (PEP) breath sounds unchanged   Signs of Intolerance (PEP Therapy) none   General Safety Checklist   Safety Promotion/Fall Prevention side rails raised   Respiratory Interventions   Cough And Deep Breathing done  "independently per patient   Airway/Ventilation Management airway patency maintained;calming measures promoted;humidification applied;pulmonary hygiene promoted   Airway/Ventilation Support comfort measures provided;cough relief provided;dyspnea relief promoted;humidification applied;pulmonary hygiene promoted   Breathing Techniques/Airway Clearance deep/controlled cough encouraged;diaphragmatic breathing promoted   Preset CPAP/BiPAP Settings   Mode Of Delivery home unit;standby   CPAP/BIPAP charged w/in last 24 h NO   Equipment Type Other (see comment)  (HOME UNIT)   Education   $ Education Bronchodilator;Oxygen;PEP Therapy;Other (see comment)  (CPAP)   Respiratory Evaluation   $ Care Plan Tech Time 15 min   Oxygen Care Plan   Oxygen Care Plan Per Protocol   SPO2 Goal (%) MD order   Rationale SpO2 is <MD Goal   Bronchodilator Care Plan   Bronchodilator Care Plan Aerosol   Aerosol Meds w/ frequency Xopenex(Levalbuterol HCL) 1.25mg Q 6Hr;Pulmicort(Budenoside) 0.5mg Q 12Hr;Sodium Chloride 3% BID   DPI Meds w/ frequency Other  (SPIRIVA 2.5 MCG  2 PUFFS DAILY)   Rationale Wheezing;Chest "tightness" with breathing;Shortness of breath (increased WOB);Other   Airway Clearance Care Plan   Airway Clearance Care Plan PEP Therapy   Frequency TID   Rationale Rhonchi     "

## 2025-06-27 NOTE — PLAN OF CARE
Pt had eventful day. Remains very short of breath with any movement. Pt refuses to reposition most of day due to inability to breath well. Pain controlled. Pt reported lower abdominal pain(states he hasn't had a bm in 2 days). Suppository and enema done with great success. Xray completed. Refuses to eat any meals, states he does not have an appetite. Refer to flowsheet for assessment.     Problem: Adult Inpatient Plan of Care  Goal: Absence of Hospital-Acquired Illness or Injury  Outcome: Progressing     Problem: Adult Inpatient Plan of Care  Goal: Plan of Care Review  Outcome: Not Progressing     Problem: Adult Inpatient Plan of Care  Goal: Optimal Comfort and Wellbeing  Outcome: Not Progressing

## 2025-06-27 NOTE — PT/OT/SLP PROGRESS
Physical Therapy Treatment and reassessment    Patient Name:  Jordin Allen Jr.   MRN:  [id number]    Recommendations:     Discharge Recommendations: Moderate Intensity Therapy  Discharge Equipment Recommendations: other (see comments) (TBD)  Barriers to discharge: Inaccessible home    Assessment:     Jordin Allen Jr. is a 79 y.o. male admitted with a medical diagnosis of Acute on chronic respiratory failure with hypoxia.  He presents with the following impairments/functional limitations: weakness, impaired endurance, impaired sensation, impaired functional mobility, impaired cardiopulmonary response to activity Patient very weak today. Stated he feels SOB, however o2 sats were 95% except for a cough ing episode that dropped into the 80% for a short period. RT notified about  SOB . Limited ability to participate in therapy.     Rehab Prognosis: Fair; patient would benefit from acute skilled PT services to address these deficits and reach maximum level of function.    Recent Surgery: * No surgery found *      Plan:     During this hospitalization, patient to be seen 5 x/week to address the identified rehab impairments via therapeutic activities, therapeutic exercises, neuromuscular re-education and progress toward the following goals:    Plan of Care Expires:       Subjective     Chief Complaint: SOB.  Patient/Family Comments/goals: NA  Pain/Comfort:  Pain Rating 1: 0/10 (Patient with co constipation)      Objective:     Communicated with nurse and RT prior to session.  Patient found HOB elevated with pulse ox (continuous), telemetry, peripheral IV upon PT entry to room.     General Precautions: Standard, fall  Orthopedic Precautions: spinal precautions  Braces: TLSO  Respiratory Status: Nasal cannula, flow 5 L/min     Functional Mobility:  Bed Mobility:     Rolling Left:  modified independence  Rolling Right: modified independence      AM-PAC 6 CLICK MOBILITY  Turning over in bed (including adjusting bedclothes,  sheets and blankets)?: 3  Sitting down on and standing up from a chair with arms (e.g., wheelchair, bedside commode, etc.): 1  Moving from lying on back to sitting on the side of the bed?: 2  Moving to and from a bed to a chair (including a wheelchair)?: 1  Need to walk in hospital room?: 1  Climbing 3-5 steps with a railing?: 1  Basic Mobility Total Score: 9       Treatment & Education:  Patient unable to sit EOB due to fatigue and SOB. Patient performed supine AAROM x 20 reps each to B LE hip flexion, ABD/ADD, knee flexion/ extension, ankle DF/PF.     Pt educated on role and purpose of therapy  Pt educated on goal setting  Pt educated on benefits of OOB activity        Patient left HOB elevated with all lines intact, call button in reach, and RT present..    GOALS:   Multidisciplinary Problems       Physical Therapy Goals          Problem: Physical Therapy    Goal Priority Disciplines Outcome Interventions   Physical Therapy Goal     PT, PT/OT Progressing    Description: Goals to be met by: DC     Patient will increase functional independence with mobility by performin. Supine to sit with [place]  2. Sit to supine with [place]  3. Sit to stand transfer with Supervision with RW  4. Gait  x 150 feet with Contact Guard Assistance using Rolling Walker.                          DME Justifications:  No DME recommended requiring DME justifications    Time Tracking:     PT Received On: 25  PT Start Time: 820     PT Stop Time: 843  PT Total Time (min): 23 min     Billable Minutes: Therapeutic Activity 23    Treatment Type: Treatment  PT/PTA: PT     Number of PTA visits since last PT visit: 0     2025

## 2025-06-27 NOTE — PROGRESS NOTES
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 His illness coarse started in late May with PNA and hypoxemic RF and he was Dc'ed to the LTAC but was hospitalized again from  for concern of MI and PNA/COPDe and again - for above. He was on broad spectrum Abx which were weaned to Levaquin on DC after he was able to be weaned from BIPAP to NC 3-5LPM.            Subjective:      NAEON. Pt continues to get dyspneic with weaning of flow below 5L despite O2 sats remaining at goal. Also noting constipation with no BM since Wednesday.     Objective:   Last 24 Hour Vital Signs:  BP  Min: 95/60  Max: 136/63  Temp  Av.9 °F (36.6 °C)  Min: 97.7 °F (36.5 °C)  Max: 98.2 °F (36.8 °C)  Pulse  Av.5  Min: 80  Max: 108  Resp  Av.4  Min: 16  Max: 35  SpO2  Av.6 %  Min: 89 %  Max: 100 %  Weight  Av.6 kg (197 lb 8.5 oz)  Min: 89.6 kg (197 lb 8.5 oz)  Max: 89.6 kg (197 lb 8.5 oz)  I/O last 3 completed shifts:  In: 977 [P.O.:977]  Out: 775 [Urine:775]    Physical Examination:  Gen: well developed, NAD  HEENT: NCAT, no LAD  CV:  no murmur, radial pulses equal  Pulm: ND on 5LPM, lung sounds wheezes bilaterally  Abd: soft, NTND  Ext: no edema  Neuro: AAOx3, no FND  Skin: warm, dry, intact, no rash  Psych:  Appropriate mood and affect, normal judgement        Laboratory:  Trended Lab Data:  Recent Labs     25  0415 25  0445   WBC 12.53 8.77   HGB 9.9* 9.7*   HCT 30.7* 30.3*   * 81*    137   K 3.7 3.7    101   CO2 29 30*   BUN 29* 20   CREATININE 0.8 0.7   * 106   BILITOT 0.6 0.8   AST 20 17   ALT 55* 36   ALKPHOS 95 89   CALCIUM 8.6* 8.4*   ALBUMIN 2.0* 1.7*   PROT 5.1* 5.0*  "  MG 2.0 2.0   PHOS 2.7 2.3*       Cardiac: No results for input(s): "TROPONINI", "CKTOTAL", "CKMB", "BNP" in the last 168 hours.    Urinalysis:   Lab Results   Component Value Date    COLORU Yellow 06/09/2025    SPECGRAV 1.010 06/09/2025    NITRITE Negative 06/09/2025    KETONESU Trace (A) 02/07/2025    UROBILINOGEN Negative 06/09/2025       Microbiology:  Microbiology Results (last 7 days)       ** No results found for the last 168 hours. **              I have personally reviewed the above labs and imaging.    Current Medications:     Infusions:       Scheduled:   amiodarone  400 mg Oral BID    Followed by    [START ON 7/1/2025] amiodarone  200 mg Oral Daily    amitriptyline  25 mg Oral QHS    ammonium lactate   Topical (Top) Daily    apixaban  5 mg Oral BID    atorvastatin  80 mg Oral Daily    budesonide  0.5 mg Nebulization Q12H    fluticasone propionate  2 spray Each Nostril Daily    guaiFENesin  1,200 mg Oral BID    levalbuterol  1.2495 mg Nebulization Q6H    levETIRAcetam  500 mg Oral BID    pantoprazole  40 mg Oral Daily    roflumilast  500 mcg Oral Daily    sodium chloride 3%  4 mL Nebulization BID    tiotropium bromide  2 puff Inhalation Daily        PRN:    Current Facility-Administered Medications:     acetaminophen, 650 mg, Oral, Q8H PRN    benzonatate, 100 mg, Oral, TID PRN    bisacodyL, 10 mg, Rectal, Daily PRN    calcium carbonate, 500 mg, Oral, TID PRN    hydrOXYzine HCL, 25 mg, Oral, TID PRN    levalbuterol, 1.2495 mg, Nebulization, Q4H PRN    melatonin, 6 mg, Oral, Nightly PRN    methocarbamoL, 500 mg, Oral, TID PRN    naloxone, 0.02 mg, Intravenous, PRN    nitroGLYCERIN, 0.4 mg, Sublingual, Q5 Min PRN    ondansetron, 4 mg, Oral, Q6H PRN    oxyCODONE-acetaminophen, 1 tablet, Oral, Q4H PRN    polyethylene glycol, 17 g, Oral, BID PRN    sodium chloride 0.9%, 10 mL, Intravenous, Q12H PRN    Assessment:     Acute hypoxemic Respiratory failure  Pneumonia  COPD GOLD3  Lung Adenocarcinoma c/b radiation " pneumonitis   Recent hospitalization with PNA and parainfluenza.  Dc'ed with coarse of levofloxacin which finished 6/26. Finished course of prednisone for COPDe while inpatient.   -- Continue to wean Supplemental O2 for goal SpO2 88-92%  -- continue scheduled LAMA/ scheduled RHONDA q6h, budesonide nebs BID  -- asked RT to provide fan for dyspnea relief, unfortunately facility does not carry, Discussed with patient to ask family member to bring small portable one.   -- continue flutter therapy TID  -- continue working with PT, vertical positioning, aspiration ppx     SHOLA  -- continue nightly CPAP     Art Fagan MD  PCCM Fellow     Case discussed with Dr. Mckeon    Portions of the record may have been created with voice-recognition software. Occasional wrong-word or sound-a-like substitutions may have occurred due to the inherent limitations of voice-recognition software. Read the chart carefully and recognize, using context, where substitutions have occurred.

## 2025-06-27 NOTE — PROGRESS NOTES
Patient not seen today 2/2 to receiving suppository and fleets enema 2/2 not having bowel movements.    Will attempt next treatment day.

## 2025-06-27 NOTE — RESPIRATORY THERAPY
Pt was constipated this morning and asked to go on bipap because he was so short of breath. Pt had bm and was weaned down to 4L nc whum.  Sats >90%.

## 2025-06-27 NOTE — PROGRESS NOTES
06/27/25 1100        Wound 06/04/25 1030 Moisture associated dermatitis Sacral spine   Date First Assessed/Time First Assessed: 06/04/25 1030   Present on Original Admission: Yes  Primary Wound Type: (c) Moisture associated dermatitis  Location: (c) Sacral spine   Dressing Appearance Open to air   Periwound Area Moist   Care Cleansed with:;Wound cleanser   Dressing   (applied Triad paste, open to air)

## 2025-06-28 LAB
ABSOLUTE EOSINOPHIL (OHS): 0.06 K/UL
ABSOLUTE MONOCYTE (OHS): 0.73 K/UL (ref 0.3–1)
ABSOLUTE NEUTROPHIL COUNT (OHS): 7.8 K/UL (ref 1.8–7.7)
BASOPHILS # BLD AUTO: 0.02 K/UL
BASOPHILS NFR BLD AUTO: 0.2 %
ERYTHROCYTE [DISTWIDTH] IN BLOOD BY AUTOMATED COUNT: 17 % (ref 11.5–14.5)
HCT VFR BLD AUTO: 29.1 % (ref 40–54)
HGB BLD-MCNC: 9 GM/DL (ref 14–18)
IMM GRANULOCYTES # BLD AUTO: 0.21 K/UL (ref 0–0.04)
IMM GRANULOCYTES NFR BLD AUTO: 2.3 % (ref 0–0.5)
LYMPHOCYTES # BLD AUTO: 0.36 K/UL (ref 1–4.8)
MCH RBC QN AUTO: 28 PG (ref 27–31)
MCHC RBC AUTO-ENTMCNC: 30.9 G/DL (ref 32–36)
MCV RBC AUTO: 90 FL (ref 82–98)
NUCLEATED RBC (/100WBC) (OHS): 0 /100 WBC
PLATELET # BLD AUTO: 87 K/UL (ref 150–450)
PMV BLD AUTO: 10.7 FL (ref 9.2–12.9)
RBC # BLD AUTO: 3.22 M/UL (ref 4.6–6.2)
RELATIVE EOSINOPHIL (OHS): 0.7 %
RELATIVE LYMPHOCYTE (OHS): 3.9 % (ref 18–48)
RELATIVE MONOCYTE (OHS): 8 % (ref 4–15)
RELATIVE NEUTROPHIL (OHS): 84.9 % (ref 38–73)
WBC # BLD AUTO: 9.18 K/UL (ref 3.9–12.7)

## 2025-06-28 PROCEDURE — 25000242 PHARM REV CODE 250 ALT 637 W/ HCPCS: Performed by: STUDENT IN AN ORGANIZED HEALTH CARE EDUCATION/TRAINING PROGRAM

## 2025-06-28 PROCEDURE — 11000001 HC ACUTE MED/SURG PRIVATE ROOM

## 2025-06-28 PROCEDURE — 25000003 PHARM REV CODE 250: Performed by: STUDENT IN AN ORGANIZED HEALTH CARE EDUCATION/TRAINING PROGRAM

## 2025-06-28 PROCEDURE — 25000242 PHARM REV CODE 250 ALT 637 W/ HCPCS

## 2025-06-28 PROCEDURE — 27000221 HC OXYGEN, UP TO 24 HOURS

## 2025-06-28 PROCEDURE — 27000173 HC ACAPELLA DEVICE DH OR DM

## 2025-06-28 PROCEDURE — 63600175 PHARM REV CODE 636 W HCPCS: Performed by: STUDENT IN AN ORGANIZED HEALTH CARE EDUCATION/TRAINING PROGRAM

## 2025-06-28 PROCEDURE — 25000003 PHARM REV CODE 250: Performed by: HOSPITALIST

## 2025-06-28 PROCEDURE — 94664 DEMO&/EVAL PT USE INHALER: CPT

## 2025-06-28 PROCEDURE — 94640 AIRWAY INHALATION TREATMENT: CPT

## 2025-06-28 PROCEDURE — 27100171 HC OXYGEN HIGH FLOW UP TO 24 HOURS

## 2025-06-28 PROCEDURE — 94761 N-INVAS EAR/PLS OXIMETRY MLT: CPT

## 2025-06-28 PROCEDURE — 99900035 HC TECH TIME PER 15 MIN (STAT)

## 2025-06-28 PROCEDURE — 25000242 PHARM REV CODE 250 ALT 637 W/ HCPCS: Performed by: FAMILY MEDICINE

## 2025-06-28 PROCEDURE — 25000003 PHARM REV CODE 250

## 2025-06-28 PROCEDURE — 85025 COMPLETE CBC W/AUTO DIFF WBC: CPT | Performed by: STUDENT IN AN ORGANIZED HEALTH CARE EDUCATION/TRAINING PROGRAM

## 2025-06-28 RX ADMIN — ATORVASTATIN CALCIUM 80 MG: 80 TABLET, FILM COATED ORAL at 08:06

## 2025-06-28 RX ADMIN — LEVETIRACETAM 500 MG: 500 TABLET, FILM COATED ORAL at 08:06

## 2025-06-28 RX ADMIN — AMIODARONE HYDROCHLORIDE 400 MG: 100 TABLET ORAL at 09:06

## 2025-06-28 RX ADMIN — LEVALBUTEROL HYDROCHLORIDE 1.25 MG: 0.63 SOLUTION RESPIRATORY (INHALATION) at 07:06

## 2025-06-28 RX ADMIN — LEVETIRACETAM 500 MG: 500 TABLET, FILM COATED ORAL at 09:06

## 2025-06-28 RX ADMIN — ONDANSETRON 4 MG: 4 TABLET, ORALLY DISINTEGRATING ORAL at 06:06

## 2025-06-28 RX ADMIN — PANTOPRAZOLE SODIUM 40 MG: 40 TABLET, DELAYED RELEASE ORAL at 08:06

## 2025-06-28 RX ADMIN — APIXABAN 5 MG: 5 TABLET, FILM COATED ORAL at 09:06

## 2025-06-28 RX ADMIN — GUAIFENESIN 1200 MG: 600 TABLET, MULTILAYER, EXTENDED RELEASE ORAL at 09:06

## 2025-06-28 RX ADMIN — BUDESONIDE 0.5 MG: 0.5 SUSPENSION RESPIRATORY (INHALATION) at 07:06

## 2025-06-28 RX ADMIN — AMITRIPTYLINE HYDROCHLORIDE 25 MG: 25 TABLET, FILM COATED ORAL at 09:06

## 2025-06-28 RX ADMIN — ONDANSETRON 4 MG: 4 TABLET, ORALLY DISINTEGRATING ORAL at 08:06

## 2025-06-28 RX ADMIN — DRONABINOL 5 MG: 2.5 CAPSULE ORAL at 09:06

## 2025-06-28 RX ADMIN — Medication: at 08:06

## 2025-06-28 RX ADMIN — OXYCODONE HYDROCHLORIDE AND ACETAMINOPHEN 1 TABLET: 5; 325 TABLET ORAL at 09:06

## 2025-06-28 RX ADMIN — GUAIFENESIN 1200 MG: 600 TABLET, MULTILAYER, EXTENDED RELEASE ORAL at 08:06

## 2025-06-28 RX ADMIN — FLUTICASONE PROPIONATE 100 MCG: 50 SPRAY, METERED NASAL at 08:06

## 2025-06-28 RX ADMIN — Medication 4 ML: at 07:06

## 2025-06-28 RX ADMIN — DRONABINOL 5 MG: 2.5 CAPSULE ORAL at 08:06

## 2025-06-28 RX ADMIN — AMIODARONE HYDROCHLORIDE 400 MG: 100 TABLET ORAL at 08:06

## 2025-06-28 RX ADMIN — LEVALBUTEROL HYDROCHLORIDE 1.25 MG: 0.63 SOLUTION RESPIRATORY (INHALATION) at 01:06

## 2025-06-28 RX ADMIN — OXYCODONE HYDROCHLORIDE AND ACETAMINOPHEN 1 TABLET: 5; 325 TABLET ORAL at 02:06

## 2025-06-28 RX ADMIN — TIOTROPIUM BROMIDE INHALATION SPRAY 2 PUFF: 3.12 SPRAY, METERED RESPIRATORY (INHALATION) at 07:06

## 2025-06-28 RX ADMIN — HYDROXYZINE HYDROCHLORIDE 25 MG: 25 TABLET, FILM COATED ORAL at 02:06

## 2025-06-28 RX ADMIN — ROFLUMILAST 500 MCG: 500 TABLET ORAL at 08:06

## 2025-06-28 RX ADMIN — LEVALBUTEROL HYDROCHLORIDE 1.25 MG: 0.63 SOLUTION RESPIRATORY (INHALATION) at 02:06

## 2025-06-28 RX ADMIN — APIXABAN 5 MG: 5 TABLET, FILM COATED ORAL at 08:06

## 2025-06-28 RX ADMIN — MELATONIN TAB 3 MG 6 MG: 3 TAB at 09:06

## 2025-06-28 RX ADMIN — HYDROXYZINE HYDROCHLORIDE 25 MG: 25 TABLET, FILM COATED ORAL at 06:06

## 2025-06-28 NOTE — NURSING
Pt is pleasant. Shift started with nausea prior to breakfast. PRN Zofran ODT given with good results. Ate 25% of breakfast but appetite improved as shift progressed.     During bath and linen change, pt desatted and required increase O2 to 8L. Once pain and anxiety med was given, and rested for approx 30 min, pt recovered and O2 now at 4.5L/NC.    Small BM X 1. VSS while resting. NADN at this time.      Required Zofran and Hydroxyzine again after dinner at 1820.

## 2025-06-28 NOTE — PLAN OF CARE
Pt pleasant and cooperative.   Problem: Adult Inpatient Plan of Care  Goal: Plan of Care Review  Outcome: Progressing  Goal: Patient-Specific Goal (Individualized)  Outcome: Progressing  Goal: Absence of Hospital-Acquired Illness or Injury  Outcome: Progressing  Goal: Optimal Comfort and Wellbeing  Outcome: Progressing  Goal: Readiness for Transition of Care  Outcome: Progressing     Problem: Pneumonia  Goal: Fluid Balance  Outcome: Progressing  Goal: Resolution of Infection Signs and Symptoms  Outcome: Progressing  Goal: Effective Oxygenation and Ventilation  Outcome: Progressing     Problem: Wound  Goal: Optimal Coping  Outcome: Progressing  Goal: Optimal Functional Ability  Outcome: Progressing  Goal: Absence of Infection Signs and Symptoms  Outcome: Progressing  Goal: Improved Oral Intake  Outcome: Progressing  Goal: Optimal Pain Control and Function  Outcome: Progressing  Goal: Skin Health and Integrity  Outcome: Progressing  Goal: Optimal Wound Healing  Outcome: Progressing     Problem: Skin Injury Risk Increased  Goal: Skin Health and Integrity  Outcome: Progressing     Problem: Fall Injury Risk  Goal: Absence of Fall and Fall-Related Injury  Outcome: Progressing     Problem: Breathing Pattern Ineffective  Goal: Effective Breathing Pattern  Outcome: Progressing     Problem: Airway Clearance Ineffective  Goal: Effective Airway Clearance  Outcome: Progressing     Problem: Gas Exchange Impaired  Goal: Optimal Gas Exchange  Outcome: Progressing     Problem: Noninvasive Ventilation Acute  Goal: Effective Unassisted Ventilation and Oxygenation  Outcome: Progressing     Problem: Infection  Goal: Absence of Infection Signs and Symptoms  Outcome: Progressing   Repositioned q2h for comfort. Safety maintained.

## 2025-06-28 NOTE — CARE UPDATE
06/27/25 2007   Patient Assessment/Suction   Level of Consciousness (AVPU) alert   Respiratory Effort Normal;Unlabored   Expansion/Accessory Muscles/Retractions expansion symmetric;no retractions;no use of accessory muscles   All Lung Fields Breath Sounds diminished   Rhythm/Pattern, Respiratory unlabored   Cough Frequency no cough   PRE-TX-O2   Device (Oxygen Therapy) nasal cannula with humidification   $ Is the patient on Low Flow Oxygen? Yes   Flow (L/min) (Oxygen Therapy) 4   Oxygen Concentration (%) 33   SpO2 95 %   Pulse Oximetry Type Continuous   $ Pulse Oximetry - Multiple Charge Pulse Oximetry - Multiple   Oximetry Probe Status Assessed;Intact   Pulse 94   Resp (!) 21   Positioning HOB elevated 30 degrees   Positioning   Body Position 30 degrees   Head of Bed (HOB) Positioning HOB at 30 degrees   Aerosol Therapy   $ Aerosol Therapy Charges Aerosol Treatment   Daily Review of Necessity (SVN) completed   Respiratory Treatment Status (SVN) given   Treatment Route (SVN) mask   Patient Position HOB elevated   Post Treatment Assessment (SVN) breath sounds unchanged   Signs of Intolerance (SVN) none   Breath Sounds Post-Respiratory Treatment   Throughout All Fields Post-Treatment All Fields   Throughout All Fields Post-Treatment aeration increased   Post-treatment Heart Rate (beats/min) 93   Post-treatment Resp Rate (breaths/min) 16   Vibratory PEP Therapy   $ Vibratory PEP Charges Acapella Therapy   $ Vibratory PEP Equipment Blue Acapella - equipment   $ Vibratory PEP Tech Time Charges 15 min   Daily Review of Necessity (PEP Therapy) completed   Type (PEP Therapy) vibratory/oscillatory   Device (PEP Therapy) Acapella low (blue)   Breaths per Cycle (PEP Therapy) 10   Cycles (PEP Therapy) 1   Settings (PEP Therapy) PEP 5   Patient Position (PEP Therapy) HOB elevated   Post Treatment Assessment (PEP) breath sounds unchanged   Signs of Intolerance (PEP Therapy) none   General Safety Checklist   Safety  Promotion/Fall Prevention side rails raised   Airway Safety   Is Ambu Bag and Mask with Patient? Yes, Adult Ambu Bag and Mask   O2  at bedside? No   Suction set is at the bedside? Yes   Communication board is with the patient? No   Respiratory Interventions   Cough And Deep Breathing done independently per patient   Airway/Ventilation Management airway patency maintained   Airway/Ventilation Support comfort measures provided   Breathing Techniques/Airway Clearance deep/controlled cough encouraged   NPPV/CPAP Maintenance adjusted   Respiratory Evaluation   $ Care Plan Tech Time 15 min

## 2025-06-28 NOTE — CARE UPDATE
06/28/25 0720   Patient Assessment/Suction   Level of Consciousness (AVPU) alert   Respiratory Effort Normal;Unlabored   Expansion/Accessory Muscles/Retractions no retractions;no use of accessory muscles   All Lung Fields Breath Sounds Anterior:;Lateral:;diminished   Skin Integrity   $ Wound Care Tech Time 15 min   Area Observed Left;Right;Behind ear;Cheek;Upper lip;Nares   Skin Appearance without discoloration   PRE-TX-O2   Device (Oxygen Therapy) nasal cannula with humidification   $ Is the patient on Low Flow Oxygen? Yes   Flow (L/min) (Oxygen Therapy) 5   Oxygen Concentration (%) 40   SpO2 99 %   Pulse Oximetry Type Continuous   $ Pulse Oximetry - Multiple Charge Pulse Oximetry - Multiple   Pulse 88   Resp 20   Aerosol Therapy   $ Aerosol Therapy Charges Aerosol Treatment   Respiratory Treatment Status (SVN) given   Treatment Route (SVN) mask;air   Patient Position HOB elevated   Post Treatment Assessment (SVN) breath sounds unchanged   Signs of Intolerance (SVN) none   Ready to Wean/Extubation Screen   FIO2<=50 (chart decimal) 0.4     Patient on 5LPM NC, no resp distress noted, patient will continue to be monitored for any changes or needs

## 2025-06-28 NOTE — RESPIRATORY THERAPY
No resp events during shift, patient is on 4lpm nc, no resp distress noted, patient will continue to be monitored for any changes or needs

## 2025-06-28 NOTE — PLAN OF CARE
Problem: Adult Inpatient Plan of Care  Goal: Plan of Care Review  Outcome: Progressing  Goal: Patient-Specific Goal (Individualized)  Outcome: Progressing  Goal: Absence of Hospital-Acquired Illness or Injury  Outcome: Progressing  Goal: Optimal Comfort and Wellbeing  Outcome: Progressing  Goal: Readiness for Transition of Care  Outcome: Progressing     Problem: Pneumonia  Goal: Fluid Balance  Outcome: Progressing  Goal: Resolution of Infection Signs and Symptoms  Outcome: Progressing  Goal: Effective Oxygenation and Ventilation  Outcome: Progressing     Problem: Wound  Goal: Optimal Coping  Outcome: Progressing  Goal: Optimal Functional Ability  Outcome: Progressing  Goal: Absence of Infection Signs and Symptoms  Outcome: Progressing  Goal: Improved Oral Intake  Outcome: Progressing  Goal: Optimal Pain Control and Function  Outcome: Progressing  Goal: Skin Health and Integrity  Outcome: Progressing  Goal: Optimal Wound Healing  Outcome: Progressing     Problem: Skin Injury Risk Increased  Goal: Skin Health and Integrity  Outcome: Progressing     Problem: Fall Injury Risk  Goal: Absence of Fall and Fall-Related Injury  Outcome: Progressing     Problem: Breathing Pattern Ineffective  Goal: Effective Breathing Pattern  Outcome: Progressing     Problem: Airway Clearance Ineffective  Goal: Effective Airway Clearance  Outcome: Progressing     Problem: Gas Exchange Impaired  Goal: Optimal Gas Exchange  Outcome: Progressing     Problem: Noninvasive Ventilation Acute  Goal: Effective Unassisted Ventilation and Oxygenation  Outcome: Progressing     Problem: Infection  Goal: Absence of Infection Signs and Symptoms  Outcome: Progressing

## 2025-06-28 NOTE — PROGRESS NOTES
[facility] Medicine   Progress Note  Room: 207/207   Patient Name: Jordin Allen Jr.  MRN: [id number]  Admit Date: 6/3/2025   Length of Stay:  LOS: 25 days   Attending Physician: Bernardino Guthrie MD  Nurse Practitioner: Erinn Suggs NP    Date of Service: 06/28/2025    Principal Problem:    Acute on chronic respiratory failure with hypoxia    Brief HPI:     Jordin Allen Jr. is a 79 y.o. 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  He presented to [facility] ED on 01/22/2025 with complaints of shortness of breath and left foot redness.  Admitted to hospital medicine team for left foot cellulitis and acute hypoxic respiratory failure secondary to pneumonia/COPD exacerbation. CTA chest negative for PE. Emphysematous change with superimposed edema. Nodular focus within the anterior aspect of left upper lobe.  Started on nebs, prednisone, and empiric vanc/cefepime and doxycycline.  Respiratory panel positive for parainfluenza virus.  Course further complicated by sinus tach with PACs/18, for which he was started on diltiazem.  Blood cultures positive for staph, suspected to be contaminant.  Repeat blood cultures returned negative.  He will switch to Augmentin and doxy.  Ongoing episodes of SVT, thought to be due to underlying hypoxia.  He was able to be weaned down to high-flow nasal cannula 15 L.  Discharged to [facility] on 06/03/2025 for rehab, wound care, and O2 weaning.     6/9-6/11-sent out to [facility] for concerns of MI. Workup revealed findings concerning for pneumonia on CT. Started on empiric vanc and zosyn. Also started on 5 day course of prednisone for COPD exacerbation.    6/18 - 6/22-sent out to [facility] again for increasing O2 requirements and worsening AFib with RVR on diltiazem drip.  Use switch to amiodarone drip at [facility], and  transitioned to p.o. amiodarone successfully.  Infectious workup revealed concerns for aspiration pneumonia.  He was treated with vanc, cefepime, and levofloxacin.  Antibiotics de-escalated to oral levofloxacin.  He was weaned from BiPAP to nasal cannula at 3-5 L.      Interval History    NAEON  Vital signs reviewed and stable, afebrile, on 4 L nasal cannula this morning with O2 sats 99%  CBC reviewed-no leukocytosis, h&h 9 and 29.1. Platelets trending up to 87.    Subjective:     Review of Systems   Constitutional:  Positive for malaise/fatigue.   Respiratory:  Negative for cough, sputum production and shortness of breath.    Cardiovascular:  Negative for chest pain and leg swelling.   Gastrointestinal:  Negative for heartburn, nausea and vomiting.   Genitourinary:  Negative for dysuria and urgency.   Musculoskeletal:  Positive for joint pain (R shoulder). Negative for myalgias.   Neurological:  Positive for weakness. Negative for dizziness.   Psychiatric/Behavioral:  Negative for depression. The patient does not have insomnia.          Objective:     Vital Sign Range  Temp:  [97.6 °F (36.4 °C)-97.8 °F (36.6 °C)]   Pulse:  []   Resp:  [16-35]   BP: (100-128)/(59-77)   SpO2:  [89 %-99 %]     Body mass index is 28.94 kg/m².  Oxygen Concentration (%):  [33-39] 39        Intake/Output Summary (Last 24 hours) at 6/28/2025 0728  Last data filed at 6/27/2025 2222  Gross per 24 hour   Intake 420 ml   Output 300 ml   Net 120 ml       Physical Exam  Constitutional:       Appearance: He is ill-appearing.   HENT:      Head: Normocephalic and atraumatic.      Mouth/Throat:      Mouth: Mucous membranes are moist.      Pharynx: Oropharynx is clear.   Eyes:      Extraocular Movements: Extraocular movements intact.      Pupils: Pupils are equal, round, and reactive to light.   Cardiovascular:      Rate and Rhythm: Normal rate. Rhythm irregular.   Pulmonary:      Breath sounds: Wheezing and rhonchi present.   Abdominal:       General: Bowel sounds are normal.      Palpations: Abdomen is soft.   Musculoskeletal:      Right lower leg: Edema present.      Left lower leg: Edema present.   Skin:     General: Skin is warm and dry.   Neurological:      Mental Status: He is alert and oriented to person, place, and time. Mental status is at baseline.   Psychiatric:         Mood and Affect: Affect is flat.         Wounds       Wound 05/19/25 0233 Moisture associated dermatitis Left dorsal Foot (Active)   05/19/25 0233 Foot   Present on Original Admission: Y   Primary Wound Type: Moisture ass   Side: Left   Orientation: dorsal        Wound 05/19/25 0231 Moisture associated dermatitis Left lateral Foot (Active)   05/19/25 0231 Foot   Present on Original Admission: Y   Primary Wound Type: Moisture ass   Side: Left   Orientation: lateral        Wound 05/19/25 0231 Pressure Injury Right anterior Ankle (Active)   05/19/25 0231 Ankle   Present on Original Admission: Y   Primary Wound Type: Pressure inj   Side: Right   Orientation: anterior        Wound 05/19/25 0233 Moisture associated dermatitis Left anterior Foot (Active)   05/19/25 0233 Foot   Present on Original Admission:    Primary Wound Type: Moisture ass   Side: Left   Orientation: anterior        Wound 06/04/25 1030 Moisture associated dermatitis Right lateral Abdomen (Active)   06/04/25 1030 Abdomen   Present on Original Admission: Y   Primary Wound Type: Moisture ass   Side: Right   Orientation: lateral        Wound 06/04/25 1030 Moisture associated dermatitis Left lateral Abdomen (Active)   06/04/25 1030 Abdomen   Present on Original Admission: Y   Primary Wound Type: Moisture ass   Side: Left   Orientation: lateral        Wound 06/04/25 1030 Moisture associated dermatitis Right Groin (Active)   06/04/25 1030 Groin   Present on Original Admission: Y   Primary Wound Type: Moisture ass   Side: Right        Wound 06/04/25 1030 Moisture associated dermatitis Left Groin (Active)   06/04/25 1030  "Groin   Present on Original Admission: Y   Primary Wound Type: Moisture ass   Side: Left        Wound 06/04/25 1030 Moisture associated dermatitis Sacral spine (Active)   06/04/25 1030 Sacral spine   Present on Original Admission: Y   Primary Wound Type: Moisture ass         Wounds consistently followed by [facility] NP Sheree Tao     Labs:  Recent Labs   Lab 06/23/25  0351 06/25/25  0415 06/27/25  0445   WBC 11.91 12.53 8.77   HGB 10.2* 9.9* 9.7*   HCT 33.3* 30.7* 30.3*    133* 81*     Recent Labs   Lab 06/23/25  0520 06/25/25  0415 06/27/25  0445    139 137   K 4.6 3.7 3.7    103 101   CO2 27 29 30*   BUN 35* 29* 20   CREATININE 0.9 0.8 0.7   * 134* 106   CALCIUM 9.2 8.6* 8.4*   MG 2.1 2.0 2.0   PHOS 2.4* 2.7 2.3*     Recent Labs   Lab 06/23/25  0520 06/25/25  0415 06/27/25  0445   ALKPHOS 120 95 89   ALT 89* 55* 36   AST 42 20 17   ALBUMIN 2.1* 2.0* 1.7*   PROT 5.3* 5.1* 5.0*   BILITOT 0.6 0.6 0.8       No results for input(s): "POCTGLUCOSE" in the last 72 hours.      Meds Scheduled:   amiodarone  400 mg Oral BID    Followed by    [START ON 7/1/2025] amiodarone  200 mg Oral Daily    amitriptyline  25 mg Oral QHS    ammonium lactate   Topical (Top) Daily    apixaban  5 mg Oral BID    atorvastatin  80 mg Oral Daily    budesonide  0.5 mg Nebulization Q12H    droNABinol  5 mg Oral BID    fluticasone propionate  2 spray Each Nostril Daily    guaiFENesin  1,200 mg Oral BID    levalbuterol  1.2495 mg Nebulization Q6H    levETIRAcetam  500 mg Oral BID    pantoprazole  40 mg Oral Daily    roflumilast  500 mcg Oral Daily    sodium chloride 3%  4 mL Nebulization BID    tiotropium bromide  2 puff Inhalation Daily       Current Inpatient Problem List:  Active Hospital Problems    Diagnosis  POA    *Acute on chronic respiratory failure with hypoxia [J96.21]  Yes    Parainfluenza virus infection [B34.8]  Yes    Pneumonia of right lung due to infectious organism [J18.9]  Yes    Cellulitis " [L03.90]  Yes    TIA (transient ischemic attack) [G45.9]  Yes     ASA and statin      Metastasis to brain [C79.31]  Yes    Adenocarcinoma, lung, 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       CAD (coronary artery disease) [I25.10]  Yes     Chronic     -LHC (10/31/13) for stemi: pLAD 100%, Cx with Li's, mRCA 40%, LVEF 55%,. LVEDP 15   -Xience 3.0 x 33 mm to pLAD, post dilated with 3.5 NC and 4.0 NC.   -TTE (8/14/13): LVEF 55%, grade I diastolic dysfunction      HTN (hypertension), benign [I10]  Yes    SHOLA (obstructive sleep apnea) [G47.33]  Yes     CPAP at night      GERD (gastroesophageal reflux disease) [K21.9]  Yes     Continue PPI        Resolved Hospital Problems   No resolved problems to display.         Assessment / Plan:     Acute respiratory failure with hypoxia  COPD with acute exacerbation  Parainfluenza virus infection  Pneumonia   On Symbicort Spiriva and Daliresp at home. Follows up with pulmonology outpatient.   Completed 7 day course of Augmentin and doxycycline on 06/01  Continue Daliresp 500mcg daily  Continue Xopenex p.r.n.   Continue weaning high-flow nasal cannula  Continue guaifenesin 1200 mg b.i.d.  Completed 7 day course of levofloxacin on 6/25  Pulmonology consulted, appreciate recommendations  On 4 L nasal cannula this morning with O2 sats 99%    Constipation, new   Reports feeling constipated, also with poor appetite   Giving suppository today and will also obtain KUB   Last Bowel Movement: 06/27/25      Thrombocytopenia, new   Worsening thrombocytopenia today with platelets down to 81   Will discontinue daily aspirin and mirtazapine  06/28/2025 CBC reviewed, platelets trending up to 87    Chest pain, new   Resolved   Evaluated by cardiology while in the hospital  Will need outpatient follow up with Dr. Ba with Cardiology    "  Cellulitis   Wound followed and managed by consistent, contracted [facility] NP  Completed 7 day course of doxy and Augmentin  Will need follow up with Podiatry after discharge     Restless leg syndrome   Continue amitriptyline 25 mg nightly     Adenocarcinoma, lung, left   Metastasis to brain  Completed treatment with chemo/XRT. Brain XRT for lesionsx2. off immunotherapy since 12/24  suspect the LLL area was consolidation of prior RXT changes and not malignancy and is nearly resolved.  CT on presentation with new SANJAY nodule, need op follow up with pulm  Continue prophylactic Keppra 500 mg b.i.d.    Right shoulder pain  Having some shoulder pain to his right shoulder, which seems to be some muscle spasms.    Started Robaxin 500 mg q.i.d. p.r.n. on 6/6    Compression fracture of L1 vertebrae with routine healing   TLSO brace when out of bed  F/u with Dr. Hopkins     Dyslipidemia  TIA  CAD   Holding aspirin due to thrombocytopenia  Continue atorvastatin 80 mg daily     Tachycardia   Episodes of SVT while at the hospital   Evaluated by Cardiology   Likely worsened by underlying hypoxia   Supplemental O2   Uncontrolled AFib with RVR on 06/16, he was placed on diltiazem drip though rate remained difficult to control  [place] [facility] and ultimately started on amiodarone for rate control   Continue amiodarone 400 mg b.i.d. for 8 days, then transition to amiodarone 200 mg daily       SHOLA   Continue CPAP nightly     GERD  Continue PPI daily     Left lateral foot wound   Right anterior ankle wound   Traumatic left dorsal foot wound   Left anterior foot venous ulcer   Skin tear right distal arm   Pressure injury nose  Wound followed and managed by consistent, contracted [facility] NP        ACP 6/19  "Dr. Zaldivar and I held discussion with the patient regarding his goals of care. The patient states that he would want to be intubated for mechanical ventilation if he were to decompensate but in the event of a cardiac arrest, " "he would not want CPR and would want to be allowed to die peacefully." Per ALBA Argueta NP  Code status changed to DNR     Psychiatric Assessment  Patient Health Questionnaire-9 (PHQ-9)    Date:  06/05/2025  Total Score: 15  Screening is Positive   Diagnosis and Treatment Plan:  Moderate MDD   Continue amitriptyline 25 mg nightly   Increasing mirtazapine to 15 mg nightly, which will hopefully help with his appetite as well       Diet:  Cardiac   GI Ppx:  PPI   DVT Ppx:  Enoxaparin     Lines:  PIV   Drains:  None   Airways:n/a   Wounds:  Multiple    Goals of Care:   Restorative,  Treat infection  Optimize nutrition  Wound healing  Muscle strengthening  Restoration of ADL's  Improve mobility    Anticipated Disposition:    TBD    Follow up appointments:   Future Appointments   Date Time Provider Department Center   7/1/2025 10:30 AM [facility] OIC-XRAY [facility]   7/1/2025 11:30 AM Bolivar Sr PA [facility] [id number] [facility]   7/8/2025  9:15 AM MEEKS, VISUAL FIELDS [facility] OPHTHAL [facility]   7/8/2025 10:00 AM Cara Looney MD [facility] OPHTHAL [facility]   7/15/2025 10:00 AM Garrett Lloyd DPM NOM POD [facility] Ort   7/23/2025 10:15 AM CV [facility] ECHO [facility] CARDIA [place]   7/30/2025  8:30 AM [facility] OIC-MRI1 [facility] MRI [facility]   7/30/2025 10:00 AM Bienvenido Henson MD NOM RAD ONC [facility]   8/15/2025  9:20 AM Bienvenido Ward MD [facility] PULMON [place]   8/27/2025  9:00 AM Guido Trejo MD NOM ENT [facility]   9/11/2025  8:30 AM Yan Palmer MD [facility] DERM [facility]         Erinn Suggs NP  Hospital Medicine [facility]    I have spent 55 minutes reviewing patient records, examining, and  of the patient/ patient's family with greater than 50% of the time spent with direct patient care and coordination.           "

## 2025-06-29 PROCEDURE — 99900035 HC TECH TIME PER 15 MIN (STAT)

## 2025-06-29 PROCEDURE — 25000242 PHARM REV CODE 250 ALT 637 W/ HCPCS: Performed by: STUDENT IN AN ORGANIZED HEALTH CARE EDUCATION/TRAINING PROGRAM

## 2025-06-29 PROCEDURE — 94761 N-INVAS EAR/PLS OXIMETRY MLT: CPT

## 2025-06-29 PROCEDURE — 94799 UNLISTED PULMONARY SVC/PX: CPT

## 2025-06-29 PROCEDURE — 25000003 PHARM REV CODE 250: Performed by: STUDENT IN AN ORGANIZED HEALTH CARE EDUCATION/TRAINING PROGRAM

## 2025-06-29 PROCEDURE — 25000003 PHARM REV CODE 250: Performed by: HOSPITALIST

## 2025-06-29 PROCEDURE — S0179 MEGESTROL 20 MG: HCPCS | Performed by: NURSE PRACTITIONER

## 2025-06-29 PROCEDURE — 94640 AIRWAY INHALATION TREATMENT: CPT

## 2025-06-29 PROCEDURE — 25000003 PHARM REV CODE 250

## 2025-06-29 PROCEDURE — 27000190 HC CPAP FULL FACE MASK W/VALVE

## 2025-06-29 PROCEDURE — 63600175 PHARM REV CODE 636 W HCPCS: Performed by: STUDENT IN AN ORGANIZED HEALTH CARE EDUCATION/TRAINING PROGRAM

## 2025-06-29 PROCEDURE — C1751 CATH, INF, PER/CENT/MIDLINE: HCPCS

## 2025-06-29 PROCEDURE — 27000173 HC ACAPELLA DEVICE DH OR DM

## 2025-06-29 PROCEDURE — 25000003 PHARM REV CODE 250: Performed by: NURSE PRACTITIONER

## 2025-06-29 PROCEDURE — 25000242 PHARM REV CODE 250 ALT 637 W/ HCPCS

## 2025-06-29 PROCEDURE — 94664 DEMO&/EVAL PT USE INHALER: CPT

## 2025-06-29 PROCEDURE — 27000221 HC OXYGEN, UP TO 24 HOURS

## 2025-06-29 PROCEDURE — 11000001 HC ACUTE MED/SURG PRIVATE ROOM

## 2025-06-29 PROCEDURE — 25000242 PHARM REV CODE 250 ALT 637 W/ HCPCS: Performed by: FAMILY MEDICINE

## 2025-06-29 RX ORDER — MEGESTROL ACETATE 40 MG/ML
20 SUSPENSION ORAL DAILY
Status: DISCONTINUED | OUTPATIENT
Start: 2025-06-29 | End: 2025-07-02

## 2025-06-29 RX ADMIN — PANTOPRAZOLE SODIUM 40 MG: 40 TABLET, DELAYED RELEASE ORAL at 08:06

## 2025-06-29 RX ADMIN — LEVALBUTEROL HYDROCHLORIDE 1.25 MG: 0.63 SOLUTION RESPIRATORY (INHALATION) at 02:06

## 2025-06-29 RX ADMIN — MEGESTROL ACETATE 20 MG: 40 SUSPENSION ORAL at 08:06

## 2025-06-29 RX ADMIN — FLUTICASONE PROPIONATE 100 MCG: 50 SPRAY, METERED NASAL at 08:06

## 2025-06-29 RX ADMIN — Medication 4 ML: at 07:06

## 2025-06-29 RX ADMIN — MELATONIN TAB 3 MG 6 MG: 3 TAB at 08:06

## 2025-06-29 RX ADMIN — APIXABAN 5 MG: 5 TABLET, FILM COATED ORAL at 08:06

## 2025-06-29 RX ADMIN — GUAIFENESIN 1200 MG: 600 TABLET, MULTILAYER, EXTENDED RELEASE ORAL at 08:06

## 2025-06-29 RX ADMIN — Medication: at 08:06

## 2025-06-29 RX ADMIN — ROFLUMILAST 500 MCG: 500 TABLET ORAL at 08:06

## 2025-06-29 RX ADMIN — ATORVASTATIN CALCIUM 80 MG: 80 TABLET, FILM COATED ORAL at 08:06

## 2025-06-29 RX ADMIN — LEVETIRACETAM 500 MG: 500 TABLET, FILM COATED ORAL at 08:06

## 2025-06-29 RX ADMIN — AMIODARONE HYDROCHLORIDE 400 MG: 100 TABLET ORAL at 08:06

## 2025-06-29 RX ADMIN — LEVALBUTEROL HYDROCHLORIDE 1.25 MG: 0.63 SOLUTION RESPIRATORY (INHALATION) at 01:06

## 2025-06-29 RX ADMIN — Medication 4 ML: at 09:06

## 2025-06-29 RX ADMIN — LEVALBUTEROL HYDROCHLORIDE 1.25 MG: 0.63 SOLUTION RESPIRATORY (INHALATION) at 09:06

## 2025-06-29 RX ADMIN — BUDESONIDE 0.5 MG: 0.5 SUSPENSION RESPIRATORY (INHALATION) at 09:06

## 2025-06-29 RX ADMIN — DRONABINOL 5 MG: 2.5 CAPSULE ORAL at 08:06

## 2025-06-29 RX ADMIN — OXYCODONE HYDROCHLORIDE AND ACETAMINOPHEN 1 TABLET: 5; 325 TABLET ORAL at 08:06

## 2025-06-29 RX ADMIN — HYDROXYZINE HYDROCHLORIDE 25 MG: 25 TABLET, FILM COATED ORAL at 08:06

## 2025-06-29 RX ADMIN — TIOTROPIUM BROMIDE INHALATION SPRAY 2 PUFF: 3.12 SPRAY, METERED RESPIRATORY (INHALATION) at 09:06

## 2025-06-29 RX ADMIN — AMITRIPTYLINE HYDROCHLORIDE 25 MG: 25 TABLET, FILM COATED ORAL at 08:06

## 2025-06-29 RX ADMIN — BUDESONIDE 0.5 MG: 0.5 SUSPENSION RESPIRATORY (INHALATION) at 07:06

## 2025-06-29 RX ADMIN — ONDANSETRON 4 MG: 4 TABLET, ORALLY DISINTEGRATING ORAL at 08:06

## 2025-06-29 RX ADMIN — HYDROXYZINE HYDROCHLORIDE 25 MG: 25 TABLET, FILM COATED ORAL at 09:06

## 2025-06-29 RX ADMIN — LEVALBUTEROL HYDROCHLORIDE 1.25 MG: 0.63 SOLUTION RESPIRATORY (INHALATION) at 07:06

## 2025-06-29 NOTE — PROGRESS NOTES
[facility] Medicine   Progress Note  Room: 207/207   Patient Name: Jordin Allen Jr.  MRN: [id number]  Admit Date: 6/3/2025   Length of Stay:  LOS: 26 days   Attending Physician: Bernardino Guthrie MD  Nurse Practitioner: Erinn Suggs NP    Date of Service: 06/29/2025    Principal Problem:    Acute on chronic respiratory failure with hypoxia    Brief HPI:     Jordin Allen Jr. is a 79 y.o. 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  He presented to [facility] ED on 01/22/2025 with complaints of shortness of breath and left foot redness.  Admitted to hospital medicine team for left foot cellulitis and acute hypoxic respiratory failure secondary to pneumonia/COPD exacerbation. CTA chest negative for PE. Emphysematous change with superimposed edema. Nodular focus within the anterior aspect of left upper lobe.  Started on nebs, prednisone, and empiric vanc/cefepime and doxycycline.  Respiratory panel positive for parainfluenza virus.  Course further complicated by sinus tach with PACs/18, for which he was started on diltiazem.  Blood cultures positive for staph, suspected to be contaminant.  Repeat blood cultures returned negative.  He will switch to Augmentin and doxy.  Ongoing episodes of SVT, thought to be due to underlying hypoxia.  He was able to be weaned down to high-flow nasal cannula 15 L.  Discharged to [facility] on 06/03/2025 for rehab, wound care, and O2 weaning.     6/9-6/11-sent out to [facility] for concerns of MI. Workup revealed findings concerning for pneumonia on CT. Started on empiric vanc and zosyn. Also started on 5 day course of prednisone for COPD exacerbation.    6/18 - 6/22-sent out to [facility] again for increasing O2 requirements and worsening AFib with RVR on diltiazem drip.  Use switch to amiodarone drip at [facility], and  transitioned to p.o. amiodarone successfully.  Infectious workup revealed concerns for aspiration pneumonia.  He was treated with vanc, cefepime, and levofloxacin.  Antibiotics de-escalated to oral levofloxacin.  He was weaned from BiPAP to nasal cannula at 3-5 L.      Interval History    NAEON  Vital signs reviewed and stable, afebrile, on 8L nasal cannula this morning with O2 sats 97%  Patient complained of low appetite and requested an appetite stimulant- megace 20 mg daily initiated.       Subjective:     Review of Systems   Constitutional:  Positive for malaise/fatigue.   Respiratory:  Negative for cough, sputum production and shortness of breath.    Cardiovascular:  Negative for chest pain and leg swelling.   Gastrointestinal:  Negative for heartburn, nausea and vomiting.   Genitourinary:  Negative for dysuria and urgency.   Musculoskeletal:  Positive for joint pain (R shoulder). Negative for myalgias.   Neurological:  Positive for weakness. Negative for dizziness.   Psychiatric/Behavioral:  Negative for depression. The patient does not have insomnia.          Objective:     Vital Sign Range  Temp:  [97.4 °F (36.3 °C)-97.5 °F (36.4 °C)]   Pulse:  [74-96]   Resp:  [17-26]   BP: ()/(56-82)   SpO2:  [93 %-99 %]     Body mass index is 27.28 kg/m².  Oxygen Concentration (%):  [36-98] 98        Intake/Output Summary (Last 24 hours) at 6/29/2025 9265  Last data filed at 6/29/2025 0429  Gross per 24 hour   Intake 600 ml   Output 950 ml   Net -350 ml       Physical Exam  Constitutional:       Appearance: He is ill-appearing.   HENT:      Head: Normocephalic and atraumatic.      Mouth/Throat:      Mouth: Mucous membranes are moist.      Pharynx: Oropharynx is clear.   Eyes:      Extraocular Movements: Extraocular movements intact.      Pupils: Pupils are equal, round, and reactive to light.   Cardiovascular:      Rate and Rhythm: Normal rate. Rhythm irregular.   Pulmonary:      Breath sounds: Wheezing and rhonchi  present.   Abdominal:      General: Bowel sounds are normal.      Palpations: Abdomen is soft.   Musculoskeletal:      Right lower leg: Edema present.      Left lower leg: Edema present.   Skin:     General: Skin is warm and dry.   Neurological:      Mental Status: He is alert and oriented to person, place, and time. Mental status is at baseline.   Psychiatric:         Mood and Affect: Affect is flat.         Wounds       Wound 05/19/25 0233 Moisture associated dermatitis Left dorsal Foot (Active)   05/19/25 0233 Foot   Present on Original Admission: Y   Primary Wound Type: Moisture ass   Side: Left   Orientation: dorsal        Wound 05/19/25 0231 Moisture associated dermatitis Left lateral Foot (Active)   05/19/25 0231 Foot   Present on Original Admission: Y   Primary Wound Type: Moisture ass   Side: Left   Orientation: lateral        Wound 05/19/25 0231 Pressure Injury Right anterior Ankle (Active)   05/19/25 0231 Ankle   Present on Original Admission: Y   Primary Wound Type: Pressure inj   Side: Right   Orientation: anterior        Wound 05/19/25 0233 Moisture associated dermatitis Left anterior Foot (Active)   05/19/25 0233 Foot   Present on Original Admission:    Primary Wound Type: Moisture ass   Side: Left   Orientation: anterior        Wound 06/04/25 1030 Moisture associated dermatitis Right lateral Abdomen (Active)   06/04/25 1030 Abdomen   Present on Original Admission: Y   Primary Wound Type: Moisture ass   Side: Right   Orientation: lateral        Wound 06/04/25 1030 Moisture associated dermatitis Left lateral Abdomen (Active)   06/04/25 1030 Abdomen   Present on Original Admission: Y   Primary Wound Type: Moisture ass   Side: Left   Orientation: lateral        Wound 06/04/25 1030 Moisture associated dermatitis Right Groin (Active)   06/04/25 1030 Groin   Present on Original Admission: Y   Primary Wound Type: Moisture ass   Side: Right        Wound 06/04/25 1030 Moisture associated dermatitis Left Groin  "(Active)   06/04/25 1030 Groin   Present on Original Admission: Y   Primary Wound Type: Moisture ass   Side: Left        Wound 06/04/25 1030 Moisture associated dermatitis Sacral spine (Active)   06/04/25 1030 Sacral spine   Present on Original Admission: Y   Primary Wound Type: Moisture ass         Wounds consistently followed by [facility] NP Sheree Tao     Labs:  Recent Labs   Lab 06/25/25  0415 06/27/25  0445 06/28/25  0359   WBC 12.53 8.77 9.18   HGB 9.9* 9.7* 9.0*   HCT 30.7* 30.3* 29.1*   * 81* 87*     Recent Labs   Lab 06/23/25  0520 06/25/25  0415 06/27/25  0445    139 137   K 4.6 3.7 3.7    103 101   CO2 27 29 30*   BUN 35* 29* 20   CREATININE 0.9 0.8 0.7   * 134* 106   CALCIUM 9.2 8.6* 8.4*   MG 2.1 2.0 2.0   PHOS 2.4* 2.7 2.3*     Recent Labs   Lab 06/23/25  0520 06/25/25  0415 06/27/25  0445   ALKPHOS 120 95 89   ALT 89* 55* 36   AST 42 20 17   ALBUMIN 2.1* 2.0* 1.7*   PROT 5.3* 5.1* 5.0*   BILITOT 0.6 0.6 0.8       No results for input(s): "POCTGLUCOSE" in the last 72 hours.      Meds Scheduled:   amiodarone  400 mg Oral BID    Followed by    [START ON 7/1/2025] amiodarone  200 mg Oral Daily    amitriptyline  25 mg Oral QHS    ammonium lactate   Topical (Top) Daily    apixaban  5 mg Oral BID    atorvastatin  80 mg Oral Daily    budesonide  0.5 mg Nebulization Q12H    droNABinol  5 mg Oral BID    fluticasone propionate  2 spray Each Nostril Daily    guaiFENesin  1,200 mg Oral BID    levalbuterol  1.2495 mg Nebulization Q6H    levETIRAcetam  500 mg Oral BID    pantoprazole  40 mg Oral Daily    roflumilast  500 mcg Oral Daily    sodium chloride 3%  4 mL Nebulization BID    tiotropium bromide  2 puff Inhalation Daily       Current Inpatient Problem List:  Active Hospital Problems    Diagnosis  POA    *Acute on chronic respiratory failure with hypoxia [J96.21]  Yes    Parainfluenza virus infection [B34.8]  Yes    Pneumonia of right lung due to infectious organism [J18.9]  " Yes    Cellulitis [L03.90]  Yes    TIA (transient ischemic attack) [G45.9]  Yes     ASA and statin      Metastasis to brain [C79.31]  Yes    Adenocarcinoma, lung, 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       CAD (coronary artery disease) [I25.10]  Yes     Chronic     -LHC (10/31/13) for stemi: pLAD 100%, Cx with Li's, mRCA 40%, LVEF 55%,. LVEDP 15   -Xience 3.0 x 33 mm to pLAD, post dilated with 3.5 NC and 4.0 NC.   -TTE (8/14/13): LVEF 55%, grade I diastolic dysfunction      HTN (hypertension), benign [I10]  Yes    SHOLA (obstructive sleep apnea) [G47.33]  Yes     CPAP at night      GERD (gastroesophageal reflux disease) [K21.9]  Yes     Continue PPI        Resolved Hospital Problems   No resolved problems to display.         Assessment / Plan:     Acute respiratory failure with hypoxia  COPD with acute exacerbation  Parainfluenza virus infection  Pneumonia   On Symbicort Spiriva and Daliresp at home. Follows up with pulmonology outpatient.   Completed 7 day course of Augmentin and doxycycline on 06/01  Continue Daliresp 500mcg daily  Continue Xopenex p.r.n.   Continue weaning high-flow nasal cannula  Continue guaifenesin 1200 mg b.i.d.  Completed 7 day course of levofloxacin on 6/25  Pulmonology consulted, appreciate recommendations  On 8L nasal cannula this morning with O2 sats 97%    Constipation, new   Reports feeling constipated, also with poor appetite   Giving suppository today and will also obtain KUB   Last Bowel Movement: 06/28/25      Thrombocytopenia, new   Worsening thrombocytopenia today with platelets down to 81   Will discontinue daily aspirin and mirtazapine  6/28 CBC reviewed, platelets trending up to 87  6/29 continue to trend with scheduled labs    Chest pain, new   Resolved   Evaluated by cardiology while in the hospital  Will need  "outpatient follow up with Dr. Ba with Cardiology     Cellulitis   Wound followed and managed by consistent, contracted [facility] NP  Completed 7 day course of doxy and Augmentin  Will need follow up with Podiatry after discharge     Restless leg syndrome   Continue amitriptyline 25 mg nightly     Adenocarcinoma, lung, left   Metastasis to brain  Completed treatment with chemo/XRT. Brain XRT for lesionsx2. off immunotherapy since 12/24  suspect the LLL area was consolidation of prior RXT changes and not malignancy and is nearly resolved.  CT on presentation with new SANJAY nodule, need op follow up with pulm  Continue prophylactic Keppra 500 mg b.i.d.    Right shoulder pain  Having some shoulder pain to his right shoulder, which seems to be some muscle spasms.    Started Robaxin 500 mg q.i.d. p.r.n. on 6/6    Compression fracture of L1 vertebrae with routine healing   TLSO brace when out of bed  F/u with Dr. Hopkins     Dyslipidemia  TIA  CAD   Holding aspirin due to thrombocytopenia  Continue atorvastatin 80 mg daily     Tachycardia   Episodes of SVT while at the hospital   Evaluated by Cardiology   Likely worsened by underlying hypoxia   Supplemental O2   Uncontrolled AFib with RVR on 06/16, he was placed on diltiazem drip though rate remained difficult to control  Sent back to [facility] and ultimately started on amiodarone for rate control   Continue amiodarone 400 mg b.i.d. for 8 days, then transition to amiodarone 200 mg daily       SHOLA   Continue CPAP nightly     GERD  Continue PPI daily     Left lateral foot wound   Right anterior ankle wound   Traumatic left dorsal foot wound   Left anterior foot venous ulcer   Skin tear right distal arm   Pressure injury nose  Wound followed and managed by consistent, contracted [facility] NP        ACP 6/19  "Dr. Zaldivar and I held discussion with the patient regarding his goals of care. The patient states that he would want to be intubated for mechanical ventilation if he were " "to decompensate but in the event of a cardiac arrest, he would not want CPR and would want to be allowed to die peacefully." Per ALBA Argueta NP  Code status changed to DNR     Psychiatric Assessment  Patient Health Questionnaire-9 (PHQ-9)    Date:  06/05/2025  Total Score: 15  Screening is Positive   Diagnosis and Treatment Plan:  Moderate MDD   Continue amitriptyline 25 mg nightly   Increasing mirtazapine to 15 mg nightly, which will hopefully help with his appetite as well       Diet:  Cardiac   GI Ppx:  PPI   DVT Ppx:  Enoxaparin     Lines:  PIV   Drains:  None   Airways:n/a   Wounds:  Multiple    Goals of Care:   Restorative,  Treat infection  Optimize nutrition  Wound healing  Muscle strengthening  Restoration of ADL's  Improve mobility    Anticipated Disposition:    TBD    Follow up appointments:   Future Appointments   Date Time Provider Department Center   7/1/2025 10:30 AM [facility]   7/1/2025 11:30 AM Bolivar Sr PA [facility] NEUROS8 [facility]   7/8/2025  9:15 AM MEEKS, VISUAL FIELDS [facility] OPHTHAL [facility]   7/8/2025 10:00 AM Cara Looney MD [facility] OPHTHAL [facility]   7/15/2025 10:00 AM Garrett Lloyd DPM NOM POD [facility]   7/23/2025 10:15 AM CV [facility] ECHO [facility]IA [place]   7/30/2025  8:30 AM [facility] OIC-MRI1 [facility]   7/30/2025 10:00 AM Bienvenido Henson MD [facility] RAD ONC [facility]   8/15/2025  9:20 AM Bienvenido Ward MD [facility] PULMON [place]   8/27/2025  9:00 AM Guido Trejo MD [facility] ENT [facility]   9/11/2025  8:30 AM Yan Palmer MD [facility] DERM [facility]         Erinn Suggs, ORACIO  Hospital Medicine [facility]    I have spent 54 minutes reviewing patient records, examining, and  of the patient/ patient's family with greater than 50% of the time spent with direct patient care and coordination.           "

## 2025-06-29 NOTE — CARE UPDATE
06/28/25 1929   Patient Assessment/Suction   Level of Consciousness (AVPU) alert   Respiratory Effort Normal;Unlabored   Expansion/Accessory Muscles/Retractions expansion symmetric;no retractions;no use of accessory muscles   All Lung Fields Breath Sounds coarse;diminished   Rhythm/Pattern, Respiratory unlabored   Cough Frequency no cough   PRE-TX-O2   Device (Oxygen Therapy) nasal cannula with humidification   $ Is the patient on Low Flow Oxygen? Yes   Flow (L/min) (Oxygen Therapy) 5   Oxygen Concentration (%) 98   SpO2 97 %   Pulse Oximetry Type Continuous   $ Pulse Oximetry - Multiple Charge Pulse Oximetry - Multiple   Oximetry Probe Status Assessed;Intact   Pulse 94   Resp (!) 25   Positioning HOB elevated 45 degrees   Positioning   Body Position 30 degrees   Head of Bed (HOB) Positioning HOB at 30-45 degrees   Positioning/Transfer Devices pillows;in use   Aerosol Therapy   $ Aerosol Therapy Charges Aerosol Treatment   Daily Review of Necessity (SVN) completed   Respiratory Treatment Status (SVN) given   Treatment Route (SVN) mask   Patient Position HOB elevated   Post Treatment Assessment (SVN) breath sounds unchanged   Signs of Intolerance (SVN) none   Breath Sounds Post-Respiratory Treatment   Throughout All Fields Post-Treatment All Fields   Throughout All Fields Post-Treatment aeration increased   Post-treatment Heart Rate (beats/min) 96   Post-treatment Resp Rate (breaths/min) 22   Vibratory PEP Therapy   $ Vibratory PEP Charges Acapella Therapy   $ Vibratory PEP Equipment Blue Acapella - equipment   $ Vibratory PEP Tech Time Charges 15 min   Daily Review of Necessity (PEP Therapy) completed   Type (PEP Therapy) vibratory/oscillatory   Device (PEP Therapy) Acapella low (blue)   Route (PEP Therapy) mask   Breaths per Cycle (PEP Therapy) 10   Cycles (PEP Therapy) 1   Settings (PEP Therapy) PEP 5   Patient Position (PEP Therapy) HOB elevated   Post Treatment Assessment (PEP) breath sounds  unchanged;increased aeration   Signs of Intolerance (PEP Therapy) none   General Safety Checklist   Safety Promotion/Fall Prevention side rails raised   Airway Safety   Is Ambu Bag and Mask with Patient? Yes, Adult Ambu Bag and Mask   O2  at bedside? No   Suction set is at the bedside? Yes   Communication board is with the patient? No   Respiratory Interventions   Cough And Deep Breathing done independently per patient   Preset CPAP/BiPAP Settings   Mode Of Delivery standby   CPAP/BIPAP charged w/in last 24 h NO   Respiratory Evaluation   $ Care Plan Tech Time 15 min

## 2025-06-29 NOTE — PLAN OF CARE
Patient had a good day; no complaints of pain or distress at this time.  Appetite is fair, drinking supplements.  No issues today. Bed in lowest position, call light within reach.    Problem: Adult Inpatient Plan of Care  Goal: Plan of Care Review  Outcome: Progressing  Goal: Patient-Specific Goal (Individualized)  Outcome: Progressing  Goal: Absence of Hospital-Acquired Illness or Injury  Outcome: Progressing  Goal: Optimal Comfort and Wellbeing  Outcome: Progressing  Goal: Readiness for Transition of Care  Outcome: Progressing

## 2025-06-29 NOTE — CARE UPDATE
06/29/25 0907   Patient Assessment/Suction   Level of Consciousness (AVPU) alert   Respiratory Effort Unlabored   Expansion/Accessory Muscles/Retractions no retractions;no use of accessory muscles   SANJAY Breath Sounds clear   LLL Breath Sounds crackles   RUL Breath Sounds diminished   RML Breath Sounds diminished   RLL Breath Sounds diminished   Rhythm/Pattern, Respiratory pattern regular   Cough Frequency infrequent   Cough Type good;nonproductive   PRE-TX-O2   Device (Oxygen Therapy) nasal cannula with humidification   $ Is the patient on Low Flow Oxygen? Yes   Flow (L/min) (Oxygen Therapy) 5  (decreased to 3 lpm)   Pulse Oximetry Type Continuous   $ Pulse Oximetry - Multiple Charge Pulse Oximetry - Multiple   Resp (!) 22   Aerosol Therapy   $ Aerosol Therapy Charges Aerosol Treatment   Respiratory Treatment Status (SVN) given   Treatment Route (SVN) mask;air   Patient Position HOB elevated   Post Treatment Assessment (SVN) breath sounds unchanged   Signs of Intolerance (SVN) none   Breath Sounds Post-Respiratory Treatment   Post-treatment Heart Rate (beats/min) 89   Post-treatment Resp Rate (breaths/min) 22   Equipment Change   $ RT Equipment Aerosol treatment mask;Treatment nebulizer   Nebulizer Changed? Yes   Nebulizer Change Next Due 07/06/25

## 2025-06-30 ENCOUNTER — EXTERNAL CHRONIC CARE MANAGEMENT (OUTPATIENT)
Dept: PRIMARY CARE CLINIC | Facility: CLINIC | Age: 80
End: 2025-06-30
Payer: MEDICARE

## 2025-06-30 LAB
ABSOLUTE EOSINOPHIL (OHS): 0.1 K/UL
ABSOLUTE MONOCYTE (OHS): 1.06 K/UL (ref 0.3–1)
ABSOLUTE NEUTROPHIL COUNT (OHS): 9.2 K/UL (ref 1.8–7.7)
ALBUMIN SERPL BCP-MCNC: 1.7 G/DL (ref 3.5–5.2)
ALP SERPL-CCNC: 104 UNIT/L (ref 40–150)
ALT SERPL W/O P-5'-P-CCNC: 36 UNIT/L (ref 10–44)
ANION GAP (OHS): 9 MMOL/L (ref 8–16)
AST SERPL-CCNC: 26 UNIT/L (ref 11–45)
BASOPHILS # BLD AUTO: 0.02 K/UL
BASOPHILS NFR BLD AUTO: 0.2 %
BILIRUB SERPL-MCNC: 0.7 MG/DL (ref 0.1–1)
BUN SERPL-MCNC: 23 MG/DL (ref 8–23)
CALCIUM SERPL-MCNC: 8.8 MG/DL (ref 8.7–10.5)
CHLORIDE SERPL-SCNC: 101 MMOL/L (ref 95–110)
CO2 SERPL-SCNC: 29 MMOL/L (ref 23–29)
CREAT SERPL-MCNC: 0.8 MG/DL (ref 0.5–1.4)
ERYTHROCYTE [DISTWIDTH] IN BLOOD BY AUTOMATED COUNT: 17.1 % (ref 11.5–14.5)
GFR SERPLBLD CREATININE-BSD FMLA CKD-EPI: >60 ML/MIN/1.73/M2
GLUCOSE SERPL-MCNC: 92 MG/DL (ref 70–110)
HCT VFR BLD AUTO: 29.4 % (ref 40–54)
HGB BLD-MCNC: 9.4 GM/DL (ref 14–18)
IMM GRANULOCYTES # BLD AUTO: 0.25 K/UL (ref 0–0.04)
IMM GRANULOCYTES NFR BLD AUTO: 2.3 % (ref 0–0.5)
LYMPHOCYTES # BLD AUTO: 0.43 K/UL (ref 1–4.8)
MAGNESIUM SERPL-MCNC: 2 MG/DL (ref 1.6–2.6)
MCH RBC QN AUTO: 28.3 PG (ref 27–31)
MCHC RBC AUTO-ENTMCNC: 32 G/DL (ref 32–36)
MCV RBC AUTO: 89 FL (ref 82–98)
NUCLEATED RBC (/100WBC) (OHS): 0 /100 WBC
PHOSPHATE SERPL-MCNC: 3 MG/DL (ref 2.7–4.5)
PLATELET # BLD AUTO: 102 K/UL (ref 150–450)
PMV BLD AUTO: 10.2 FL (ref 9.2–12.9)
POTASSIUM SERPL-SCNC: 3.8 MMOL/L (ref 3.5–5.1)
PROT SERPL-MCNC: 5 GM/DL (ref 6–8.4)
RBC # BLD AUTO: 3.32 M/UL (ref 4.6–6.2)
RELATIVE EOSINOPHIL (OHS): 0.9 %
RELATIVE LYMPHOCYTE (OHS): 3.9 % (ref 18–48)
RELATIVE MONOCYTE (OHS): 9.6 % (ref 4–15)
RELATIVE NEUTROPHIL (OHS): 83.1 % (ref 38–73)
SODIUM SERPL-SCNC: 139 MMOL/L (ref 136–145)
WBC # BLD AUTO: 11.06 K/UL (ref 3.9–12.7)

## 2025-06-30 PROCEDURE — 99232 SBSQ HOSP IP/OBS MODERATE 35: CPT | Mod: ,,, | Performed by: INTERNAL MEDICINE

## 2025-06-30 PROCEDURE — 99439 CHRNC CARE MGMT STAF EA ADDL: CPT | Mod: PBBFAC,PO | Performed by: INTERNAL MEDICINE

## 2025-06-30 PROCEDURE — 36415 COLL VENOUS BLD VENIPUNCTURE: CPT | Performed by: STUDENT IN AN ORGANIZED HEALTH CARE EDUCATION/TRAINING PROGRAM

## 2025-06-30 PROCEDURE — 94640 AIRWAY INHALATION TREATMENT: CPT

## 2025-06-30 PROCEDURE — 97112 NEUROMUSCULAR REEDUCATION: CPT

## 2025-06-30 PROCEDURE — 99439 CHRNC CARE MGMT STAF EA ADDL: CPT | Mod: S$PBB,,, | Performed by: INTERNAL MEDICINE

## 2025-06-30 PROCEDURE — 25000003 PHARM REV CODE 250

## 2025-06-30 PROCEDURE — 94664 DEMO&/EVAL PT USE INHALER: CPT

## 2025-06-30 PROCEDURE — 80053 COMPREHEN METABOLIC PANEL: CPT | Performed by: STUDENT IN AN ORGANIZED HEALTH CARE EDUCATION/TRAINING PROGRAM

## 2025-06-30 PROCEDURE — 94660 CPAP INITIATION&MGMT: CPT

## 2025-06-30 PROCEDURE — 83735 ASSAY OF MAGNESIUM: CPT | Performed by: STUDENT IN AN ORGANIZED HEALTH CARE EDUCATION/TRAINING PROGRAM

## 2025-06-30 PROCEDURE — 94761 N-INVAS EAR/PLS OXIMETRY MLT: CPT

## 2025-06-30 PROCEDURE — 25000003 PHARM REV CODE 250: Performed by: HOSPITALIST

## 2025-06-30 PROCEDURE — 25000242 PHARM REV CODE 250 ALT 637 W/ HCPCS: Performed by: FAMILY MEDICINE

## 2025-06-30 PROCEDURE — 25000003 PHARM REV CODE 250: Performed by: STUDENT IN AN ORGANIZED HEALTH CARE EDUCATION/TRAINING PROGRAM

## 2025-06-30 PROCEDURE — 25000003 PHARM REV CODE 250: Performed by: NURSE PRACTITIONER

## 2025-06-30 PROCEDURE — 27000221 HC OXYGEN, UP TO 24 HOURS

## 2025-06-30 PROCEDURE — 84100 ASSAY OF PHOSPHORUS: CPT | Performed by: STUDENT IN AN ORGANIZED HEALTH CARE EDUCATION/TRAINING PROGRAM

## 2025-06-30 PROCEDURE — 99900035 HC TECH TIME PER 15 MIN (STAT)

## 2025-06-30 PROCEDURE — 25000242 PHARM REV CODE 250 ALT 637 W/ HCPCS

## 2025-06-30 PROCEDURE — 30200315 PPD INTRADERMAL TEST REV CODE 302: Performed by: STUDENT IN AN ORGANIZED HEALTH CARE EDUCATION/TRAINING PROGRAM

## 2025-06-30 PROCEDURE — 63600175 PHARM REV CODE 636 W HCPCS: Performed by: STUDENT IN AN ORGANIZED HEALTH CARE EDUCATION/TRAINING PROGRAM

## 2025-06-30 PROCEDURE — 99490 CHRNC CARE MGMT STAFF 1ST 20: CPT | Mod: PBBFAC,PO | Performed by: INTERNAL MEDICINE

## 2025-06-30 PROCEDURE — 27100171 HC OXYGEN HIGH FLOW UP TO 24 HOURS

## 2025-06-30 PROCEDURE — 97530 THERAPEUTIC ACTIVITIES: CPT

## 2025-06-30 PROCEDURE — S0179 MEGESTROL 20 MG: HCPCS | Performed by: NURSE PRACTITIONER

## 2025-06-30 PROCEDURE — 97110 THERAPEUTIC EXERCISES: CPT

## 2025-06-30 PROCEDURE — 25000242 PHARM REV CODE 250 ALT 637 W/ HCPCS: Performed by: STUDENT IN AN ORGANIZED HEALTH CARE EDUCATION/TRAINING PROGRAM

## 2025-06-30 PROCEDURE — 27000173 HC ACAPELLA DEVICE DH OR DM

## 2025-06-30 PROCEDURE — 86580 TB INTRADERMAL TEST: CPT | Performed by: STUDENT IN AN ORGANIZED HEALTH CARE EDUCATION/TRAINING PROGRAM

## 2025-06-30 PROCEDURE — 99490 CHRNC CARE MGMT STAFF 1ST 20: CPT | Mod: S$PBB,,, | Performed by: INTERNAL MEDICINE

## 2025-06-30 PROCEDURE — 85025 COMPLETE CBC W/AUTO DIFF WBC: CPT | Performed by: STUDENT IN AN ORGANIZED HEALTH CARE EDUCATION/TRAINING PROGRAM

## 2025-06-30 PROCEDURE — 11000001 HC ACUTE MED/SURG PRIVATE ROOM

## 2025-06-30 RX ORDER — POLYETHYLENE GLYCOL 3350 17 G/17G
17 POWDER, FOR SOLUTION ORAL 2 TIMES DAILY
Status: DISCONTINUED | OUTPATIENT
Start: 2025-06-30 | End: 2025-07-10 | Stop reason: HOSPADM

## 2025-06-30 RX ORDER — CETIRIZINE HYDROCHLORIDE 10 MG/1
10 TABLET ORAL DAILY
Status: DISCONTINUED | OUTPATIENT
Start: 2025-06-30 | End: 2025-07-07

## 2025-06-30 RX ADMIN — APIXABAN 5 MG: 5 TABLET, FILM COATED ORAL at 08:06

## 2025-06-30 RX ADMIN — ROFLUMILAST 500 MCG: 500 TABLET ORAL at 08:06

## 2025-06-30 RX ADMIN — LEVETIRACETAM 500 MG: 500 TABLET, FILM COATED ORAL at 09:06

## 2025-06-30 RX ADMIN — GUAIFENESIN 1200 MG: 600 TABLET, MULTILAYER, EXTENDED RELEASE ORAL at 08:06

## 2025-06-30 RX ADMIN — TIOTROPIUM BROMIDE INHALATION SPRAY 2 PUFF: 3.12 SPRAY, METERED RESPIRATORY (INHALATION) at 10:06

## 2025-06-30 RX ADMIN — LEVETIRACETAM 500 MG: 500 TABLET, FILM COATED ORAL at 08:06

## 2025-06-30 RX ADMIN — APIXABAN 5 MG: 5 TABLET, FILM COATED ORAL at 09:06

## 2025-06-30 RX ADMIN — PANTOPRAZOLE SODIUM 40 MG: 40 TABLET, DELAYED RELEASE ORAL at 08:06

## 2025-06-30 RX ADMIN — Medication 4 ML: at 08:06

## 2025-06-30 RX ADMIN — ATORVASTATIN CALCIUM 80 MG: 80 TABLET, FILM COATED ORAL at 08:06

## 2025-06-30 RX ADMIN — GUAIFENESIN 1200 MG: 600 TABLET, MULTILAYER, EXTENDED RELEASE ORAL at 09:06

## 2025-06-30 RX ADMIN — LEVALBUTEROL HYDROCHLORIDE 1.25 MG: 0.63 SOLUTION RESPIRATORY (INHALATION) at 07:06

## 2025-06-30 RX ADMIN — FLUTICASONE PROPIONATE 100 MCG: 50 SPRAY, METERED NASAL at 08:06

## 2025-06-30 RX ADMIN — AMIODARONE HYDROCHLORIDE 400 MG: 100 TABLET ORAL at 09:06

## 2025-06-30 RX ADMIN — HYDROXYZINE HYDROCHLORIDE 25 MG: 25 TABLET, FILM COATED ORAL at 06:06

## 2025-06-30 RX ADMIN — AMIODARONE HYDROCHLORIDE 400 MG: 100 TABLET ORAL at 08:06

## 2025-06-30 RX ADMIN — HYDROXYZINE HYDROCHLORIDE 25 MG: 25 TABLET, FILM COATED ORAL at 09:06

## 2025-06-30 RX ADMIN — LEVALBUTEROL HYDROCHLORIDE 1.25 MG: 0.63 SOLUTION RESPIRATORY (INHALATION) at 01:06

## 2025-06-30 RX ADMIN — MEGESTROL ACETATE 20 MG: 40 SUSPENSION ORAL at 08:06

## 2025-06-30 RX ADMIN — POLYETHYLENE GLYCOL 3350 17 G: 17 POWDER, FOR SOLUTION ORAL at 12:06

## 2025-06-30 RX ADMIN — OXYCODONE HYDROCHLORIDE AND ACETAMINOPHEN 1 TABLET: 5; 325 TABLET ORAL at 09:06

## 2025-06-30 RX ADMIN — Medication 4 ML: at 07:06

## 2025-06-30 RX ADMIN — BUDESONIDE 0.5 MG: 0.5 SUSPENSION RESPIRATORY (INHALATION) at 07:06

## 2025-06-30 RX ADMIN — TUBERCULIN PURIFIED PROTEIN DERIVATIVE 5 UNITS: 5 INJECTION, SOLUTION INTRADERMAL at 12:06

## 2025-06-30 RX ADMIN — CETIRIZINE HYDROCHLORIDE 10 MG: 10 TABLET, FILM COATED ORAL at 12:06

## 2025-06-30 RX ADMIN — AMITRIPTYLINE HYDROCHLORIDE 25 MG: 25 TABLET, FILM COATED ORAL at 09:06

## 2025-06-30 RX ADMIN — OXYCODONE HYDROCHLORIDE AND ACETAMINOPHEN 1 TABLET: 5; 325 TABLET ORAL at 06:06

## 2025-06-30 RX ADMIN — POLYETHYLENE GLYCOL 3350 17 G: 17 POWDER, FOR SOLUTION ORAL at 09:06

## 2025-06-30 RX ADMIN — DRONABINOL 5 MG: 2.5 CAPSULE ORAL at 08:06

## 2025-06-30 RX ADMIN — OXYCODONE HYDROCHLORIDE AND ACETAMINOPHEN 1 TABLET: 5; 325 TABLET ORAL at 08:06

## 2025-06-30 RX ADMIN — MELATONIN TAB 3 MG 6 MG: 3 TAB at 09:06

## 2025-06-30 NOTE — CARE UPDATE
06/29/25 1950   Patient Assessment/Suction   Level of Consciousness (AVPU) alert   Respiratory Effort Normal;Unlabored   Expansion/Accessory Muscles/Retractions expansion symmetric;no retractions;no use of accessory muscles   Rhythm/Pattern, Respiratory depth regular;pattern regular;unlabored   Cough Frequency infrequent   Cough Type nonproductive   PRE-TX-O2   Device (Oxygen Therapy) nasal cannula with humidification   $ Is the patient on Low Flow Oxygen? Yes   $ Noninvasive Daily Charge Noninvasive Daily   Flow (L/min) (Oxygen Therapy) 5   Oxygen Concentration (%) 36   SpO2 96 %   Pulse Oximetry Type Continuous   $ Pulse Oximetry - Multiple Charge Pulse Oximetry - Multiple   Oximetry Probe Status Assessed;Intact   Pulse 88   Resp 20   Positioning HOB elevated 30 degrees   Positioning   Body Position upper extremity elevated   Head of Bed (HOB) Positioning HOB at 30-45 degrees   Positioning/Transfer Devices pillows;in use   Aerosol Therapy   $ Aerosol Therapy Charges Aerosol Treatment   Daily Review of Necessity (SVN) completed   Respiratory Treatment Status (SVN) given   Treatment Route (SVN) mask;air   Patient Position HOB elevated   Post Treatment Assessment (SVN) breath sounds unchanged   Signs of Intolerance (SVN) none   Breath Sounds Post-Respiratory Treatment   Throughout All Fields Post-Treatment All Fields   Throughout All Fields Post-Treatment aeration increased   Post-treatment Heart Rate (beats/min) 91   Post-treatment Resp Rate (breaths/min) 21   Vibratory PEP Therapy   $ Vibratory PEP Charges Acapella Therapy   $ Vibratory PEP Equipment Blue Acapella - equipment   $ Vibratory PEP Tech Time Charges 15 min   Daily Review of Necessity (PEP Therapy) completed   Type (PEP Therapy) vibratory/oscillatory   Device (PEP Therapy) Acapella low (blue)   Route (PEP Therapy) mouthpiece   Breaths per Cycle (PEP Therapy) 10   Cycles (PEP Therapy) 1   Settings (PEP Therapy) PEP 5   Patient Position (PEP Therapy)  HOB elevated   Post Treatment Assessment (PEP) breath sounds unchanged   Signs of Intolerance (PEP Therapy) none   General Safety Checklist   Safety Promotion/Fall Prevention side rails raised   Airway Safety   Is Ambu Bag and Mask with Patient? Yes, Adult Ambu Bag and Mask   O2  at bedside? No   Suction set is at the bedside? Yes   Communication board is with the patient? No   Equipment Change   $ RT Equipment large full face mask   Respiratory Interventions   Cough And Deep Breathing done independently per patient   Airway/Ventilation Management airway patency maintained   Airway/Ventilation Support comfort measures provided   Breathing Techniques/Airway Clearance deep/controlled cough encouraged   Preset CPAP/BiPAP Settings   Mode Of Delivery home unit;standby   CPAP/BIPAP charged w/in last 24 h NO

## 2025-06-30 NOTE — PROGRESS NOTES
[facility] - Accepted  [facility] - Accepted  [facility] - Accepted   [facility] - Accepted  [facility] - Accepted (Bed Available)    [facility] - considering O2 NC needs to be 4 Liters or less  [facility] - considering O2 NC needs to be 4 Liters or less    Updated Marilou (daughter) on accepting facility and encouraged taking the time out to view them, stated understanding, email was sent to [email or web address] with accepting facilities w/ address and phone numbers.

## 2025-06-30 NOTE — PLAN OF CARE
Patient placed on HFNC by RRT this shift, increased oxygen requirements per chart. Patient reports pain and anxiety, medications given per MAR upon request. Left ear fullness and itching reported to NP. Refuses to eat, reports absence of an appetite. PPD skin test to LEFT Forearm. DaughterMarilou at bedside requests for an update to be given by provider. Educated daughter that the providers have left for the day and will call her on tomorrow 07/01/2025. On-going monitoring continued.  Problem: Adult Inpatient Plan of Care  Goal: Plan of Care Review  Outcome: Progressing  Goal: Optimal Comfort and Wellbeing  Outcome: Progressing  Goal: Readiness for Transition of Care  Outcome: Progressing     Problem: Skin Injury Risk Increased  Goal: Skin Health and Integrity  Outcome: Progressing

## 2025-06-30 NOTE — PROGRESS NOTES
PASRR & LOCET completed, awaiting 142    Pt notified of possible discharge to [facility] on 7/1 or 7/2, stated understanding

## 2025-06-30 NOTE — PROGRESS NOTES
Daughter & son in law visited to speak of referring facilities who have reached out to them, educated in person on updated IDT meeting, educated the difference between [facility] care, snf and hospice, family stated their understanding, family was against [facility] as they have not heard good things and would prefer [facility] as it's the closes to their house, Family would prefer to keep him in the [facility] area, stated understanding and would keep them updated on acceptances.

## 2025-06-30 NOTE — PROGRESS NOTES
[facility] Pulmonary Progress Note      Brief Hx:   79M with Lung adenomacarcinoma s/p Chemo/rads/immunotherapy c/b radiation pneumonitis and necrosis with brain metastasis, CAD, SHOLA on CPAP, HTN, TIA, COPD GOLD3, emphysema admitted to LTAC 6/23 following re-admission to the [facility] for increased O2 requirement, AF/RVR, and recurrent PNA/COPDe. His illness coarse started in late May with PNA and hypoxemic RF and he was Dc'ed to the LTAC but was hospitalized again from 6/9-11 for concern of MI and PNA/COPDe and again 6/18-22 for above. He was on broad spectrum Abx which were weaned to Levaquin on DC after he was able to be weaned from BIPAP to NC 3-5LPM.      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 Completed CRT (5/8/24-6/18/24) with weekly carboplatin/paclitaxel (5/9/24-6/18/24). Currently getting consolidation durvalumab (started on 7/18/24, received 2 cycles) but held from 9/26/24-12/6/24 due to concern for radiation pneumonitis.  XRT to left hilum/lung 60 Gy completed 6/18/2024; brain metastases x 3 separate occasions with gamma knife 8/2024 (left thalamus), 10/2024 (right temporal), and 1/2025 (right temporal).  Treatment complicated by pneumonitis secondary to immunotherapy (durvalumab) vs radiation pneumonitis.  Failed durvalumab re-challenge in December 2024.       Subjective:      No acute events reported.  Oxygen increased to high flow nasal cannula at 8 lpm due to dyspnea and desaturation with slow recovery.  SpO2 100% while sleeping -- FiO2 decreased from 8 lpm to 7 lpm on rounds with stable SpO2.  Minimal wheezing noted on examination.   He remains on frequent nebulizer treatments and has been off antibiotics since levofloxacin was stopped on 6/25. Fluid balance reported to be -  "4.8 liters during [facility] stay.     Objective:   Last 24 Hour Vital Signs:  BP  Min: 93/62  Max: 139/66  Temp  Av.6 °F (36.4 °C)  Min: 97.4 °F (36.3 °C)  Max: 97.9 °F (36.6 °C)  Pulse  Av.2  Min: 75  Max: 105  Resp  Av  Min: 12  Max: 28  SpO2  Av.5 %  Min: 92 %  Max: 100 %  Weight  Av.2 kg (185 lb 10 oz)  Min: 84.2 kg (185 lb 10 oz)  Max: 84.2 kg (185 lb 10 oz)  I/O last 3 completed shifts:  In: -   Out: 425 [Urine:425]    Physical Examination:  Gen: well developed, NAD  HEENT: NCAT, no LAD  CV:  no murmur, radial pulses equal  Pulm: ND on 5LPM, lung sounds wheezes bilaterally  Abd: soft, NTND  Ext: no edema  Neuro: AAOx3, no FND  Skin: warm, dry, intact, no rash  Psych:  Appropriate mood and affect, normal judgement    Laboratory:  Trended Lab Data:  Recent Labs     25  0359 25  0416   WBC 9.18 11.06   HGB 9.0* 9.4*   HCT 29.1* 29.4*   PLT 87* 102*   NA  --  139   K  --  3.8   CL  --  101   CO2  --  29   BUN  --  23   CREATININE  --  0.8   GLU  --  92   BILITOT  --  0.7   AST  --  26   ALT  --  36   ALKPHOS  --  104   CALCIUM  --  8.8   ALBUMIN  --  1.7*   PROT  --  5.0*   MG  --  2.0   PHOS  --  3.0       Cardiac: No results for input(s): "TROPONINI", "CKTOTAL", "CKMB", "BNP" in the last 168 hours.    Urinalysis:   Lab Results   Component Value Date    COLORU Yellow 2025    SPECGRAV 1.010 2025    NITRITE Negative 2025    KETONESU Trace (A) 2025    UROBILINOGEN Negative 2025       Microbiology:  Microbiology Results (last 7 days)       ** No results found for the last 168 hours. **              I have personally reviewed the above labs and imaging.    Current Medications:     Infusions:       Scheduled:   amiodarone  400 mg Oral BID    Followed by    [START ON 2025] amiodarone  200 mg Oral Daily    amitriptyline  25 mg Oral QHS    ammonium lactate   Topical (Top) Daily    apixaban  5 mg Oral BID    atorvastatin  80 mg Oral Daily    budesonide  0.5 " mg Nebulization Q12H    droNABinol  5 mg Oral BID    fluticasone propionate  2 spray Each Nostril Daily    guaiFENesin  1,200 mg Oral BID    levalbuterol  1.2495 mg Nebulization Q6H    levETIRAcetam  500 mg Oral BID    megestroL  20 mg Oral Daily    pantoprazole  40 mg Oral Daily    polyethylene glycol  17 g Oral BID    roflumilast  500 mcg Oral Daily    sodium chloride 3%  4 mL Nebulization BID    tiotropium bromide  2 puff Inhalation Daily        PRN:    Current Facility-Administered Medications:     acetaminophen, 650 mg, Oral, Q8H PRN    benzonatate, 100 mg, Oral, TID PRN    bisacodyL, 10 mg, Rectal, Daily PRN    calcium carbonate, 500 mg, Oral, TID PRN    hydrOXYzine HCL, 25 mg, Oral, TID PRN    levalbuterol, 1.2495 mg, Nebulization, Q4H PRN    melatonin, 6 mg, Oral, Nightly PRN    methocarbamoL, 500 mg, Oral, TID PRN    naloxone, 0.02 mg, Intravenous, PRN    nitroGLYCERIN, 0.4 mg, Sublingual, Q5 Min PRN    ondansetron, 4 mg, Oral, Q6H PRN    oxyCODONE-acetaminophen, 1 tablet, Oral, Q4H PRN    sodium chloride 0.9%, 10 mL, Intravenous, Q12H PRN    Assessment:     Acute hypoxemic Respiratory failure  Pneumonia  COPD GOLD3  Lung Adenocarcinoma c/b radiation pneumonitis   Recent hospitalization with PNA and parainfluenza.  TN'ed with coarse of levofloxacin which finished 6/26. Finished course of prednisone for COPDe while inpatient.   -- Continue to wean Supplemental O2 for goal SpO2 88-92%  -- continue scheduled LAMA/ scheduled RHONDA q6h, budesonide nebs BID  -- asked RT to provide fan for dyspnea relief, unfortunately facility does not carry, Discussed with patient to ask family member to bring small portable one.   -- continue flutter therapy TID  -- continue working with PT, vertical positioning, aspiration ppx     SHOLA  -- continue nightly CPAP     Mr. Allen has been hospitalized continuously since 5/22/2025.      There was no family at the bedside on rounds.

## 2025-06-30 NOTE — PT/OT/SLP PROGRESS
Physical Therapy Treatment    Co-treatment with OT due to patients requiring two skilled therapists to safely perform mobility and to accommodate patients activity tolerance.       Patient Name:  Jordin Allen Jr.   MRN:  [id number]    Recommendations:     Discharge Recommendations: Moderate Intensity Therapy  Discharge Equipment Recommendations: other (see comments) (TBD)  Barriers to discharge: Inaccessible home    Assessment:     Jordin Allen Jr. is a 79 y.o. male admitted with a medical diagnosis of Acute on chronic respiratory failure with hypoxia.  He presents with the following impairments/functional limitations: weakness, impaired endurance, impaired functional mobility, impaired balance, decreased lower extremity function, orthopedic precautions, impaired cardiopulmonary response to activity Limited progress due to fatigue and medical decline. Patient spitting up bloody mucus today, patient would benefit from a  Yankuer suction RT notified.     Rehab Prognosis: Fair; patient would benefit from acute skilled PT services to address these deficits and reach maximum level of function.    Recent Surgery: * No surgery found *      Plan:     During this hospitalization, patient to be seen 5 x/week to address the identified rehab impairments via therapeutic activities, therapeutic exercises, neuromuscular re-education and progress toward the following goals:    Plan of Care Expires:       Subjective     Chief Complaint: No co pain, patient very tired and less motivated today.   Patient/Family Comments/goals: NA  Pain/Comfort:  Pain Rating 1: 0/10 (Patient very tired and fatigued easliy)      Objective:     Communicated with nurse prior to session.  Patient found HOB elevated with oxygen, peripheral IV, telemetry, pulse ox (continuous), bed alarm, blood pressure cuff upon PT entry to room.     General Precautions: Standard, fall  Orthopedic Precautions: spinal precautions  Braces: TLSO  Respiratory Status: Nasal  cannula, flow 6 L/min     Functional Mobility:  Bed Mobility:     Rolling Left:  stand by assistance  Rolling Right: stand by assistance  Supine to Sit: minimum assistance  Sit to Supine: minimum assistance      AM-PAC 6 CLICK MOBILITY  Turning over in bed (including adjusting bedclothes, sheets and blankets)?: 3  Sitting down on and standing up from a chair with arms (e.g., wheelchair, bedside commode, etc.): 1  Moving from lying on back to sitting on the side of the bed?: 2  Moving to and from a bed to a chair (including a wheelchair)?: 1  Need to walk in hospital room?: 1  Climbing 3-5 steps with a railing?: 1  Basic Mobility Total Score: 9       Treatment & Education:  Functional mobility as per above. Patient very weak but motivated to perform there ex and sit EOB. Patient performed supine AAROM x 20 reps each to B LE hip flexion, ABD/ADD, knee flexion/ extension, ankle DF/PF.   Patient Sat EOB for leticia 8 minutes. Patient tends to lean posterior and given VC to improve midline orientation. Seated ther ex  x 10 reps LAQ, hip flexion, AP and ABD.  Patent returned to bed with min and scooted to HOB D x 2.           Patient left HOB elevated with all lines intact and call button in reach..    GOALS:   Multidisciplinary Problems       Physical Therapy Goals          Problem: Physical Therapy    Goal Priority Disciplines Outcome Interventions   Physical Therapy Goal     PT, PT/OT Progressing    Description: Goals to be met by: DC     Patient will increase functional independence with mobility by performin. Supine to sit with [place]  2. Sit to supine with [place]  3. Sit to stand transfer with Supervision with RW  4. Gait  x 150 feet with Contact Guard Assistance using Rolling Walker.                          DME Justifications:  No DME recommended requiring DME justifications    Time Tracking:     PT Received On: 25  PT Start Time: 1110     PT Stop Time: 1138  PT Total Time (min): 28 min      Billable Minutes: Therapeutic Activity 28    Treatment Type: Treatment  PT/PTA: PT     Number of PTA visits since last PT visit: 0     06/30/2025

## 2025-06-30 NOTE — PROGRESS NOTES
[facility] Medicine   Progress Note  Room: 207/207   Patient Name: Jordin Allen Jr.  MRN: [id number]  Admit Date: 6/3/2025   Length of Stay:  LOS: 27 days   Attending Physician: Bernardino Guthrie MD  Nurse Practitioner: Ernestine Palomino NP    Date of Service: 06/30/2025    Principal Problem:    Acute on chronic respiratory failure with hypoxia    Brief HPI:     Jordin Allen Jr. is a 79 y.o. 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  He presented to [facility] ED on 01/22/2025 with complaints of shortness of breath and left foot redness.  Admitted to hospital medicine team for left foot cellulitis and acute hypoxic respiratory failure secondary to pneumonia/COPD exacerbation. CTA chest negative for PE. Emphysematous change with superimposed edema. Nodular focus within the anterior aspect of left upper lobe.  Started on nebs, prednisone, and empiric vanc/cefepime and doxycycline.  Respiratory panel positive for parainfluenza virus.  Course further complicated by sinus tach with PACs/18, for which he was started on diltiazem.  Blood cultures positive for staph, suspected to be contaminant.  Repeat blood cultures returned negative.  He will switch to Augmentin and doxy.  Ongoing episodes of SVT, thought to be due to underlying hypoxia.  He was able to be weaned down to high-flow nasal cannula 15 L.  Discharged to [facility] on 06/03/2025 for rehab, wound care, and O2 weaning.     6/9-6/11-sent out to [facility] for concerns of MI. Workup revealed findings concerning for pneumonia on CT. Started on empiric vanc and zosyn. Also started on 5 day course of prednisone for COPD exacerbation.    6/18 - 6/22-sent out to [facility] again for increasing O2 requirements and worsening AFib with RVR on diltiazem drip.  Use switch to amiodarone drip at [facility],  and transitioned to p.o. amiodarone successfully.  Infectious workup revealed concerns for aspiration pneumonia.  He was treated with vanc, cefepime, and levofloxacin.  Antibiotics de-escalated to oral levofloxacin.  He was weaned from BiPAP to nasal cannula at 3-5 L.      Interval History    Anxious and reporting shortness of breath this morning   High-flow nasal cannula increase to 8 L   Given p.r.n. hydroxyzine and p.r.n. oxycodone for air hunger   Labs reviewed and listed below, no critical values  Still with poor appetite  Having some right ear pain today, adding Zyrtec 10 mg daily      Subjective:     Review of Systems   Constitutional:  Positive for malaise/fatigue.   Respiratory:  Negative for cough, sputum production and shortness of breath.    Cardiovascular:  Negative for chest pain and leg swelling.   Gastrointestinal:  Negative for heartburn, nausea and vomiting.   Genitourinary:  Negative for dysuria and urgency.   Musculoskeletal:  Positive for joint pain (R shoulder). Negative for myalgias.   Neurological:  Positive for weakness. Negative for dizziness.   Psychiatric/Behavioral:  Negative for depression. The patient does not have insomnia.          Objective:     Vital Sign Range  Temp:  [97.4 °F (36.3 °C)-97.9 °F (36.6 °C)]   Pulse:  []   Resp:  [12-28]   BP: ()/(59-81)   SpO2:  [92 %-100 %]     Body mass index is 27.41 kg/m².  Oxygen Concentration (%):  [36] 36        Intake/Output Summary (Last 24 hours) at 6/30/2025 0963  Last data filed at 6/30/2025 0935  Gross per 24 hour   Intake 240 ml   Output 225 ml   Net 15 ml       Physical Exam  Constitutional:       Appearance: He is ill-appearing.   HENT:      Head: Normocephalic and atraumatic.      Mouth/Throat:      Mouth: Mucous membranes are moist.      Pharynx: Oropharynx is clear.   Eyes:      Extraocular Movements: Extraocular movements intact.      Pupils: Pupils are equal, round, and reactive to light.   Cardiovascular:      Rate  and Rhythm: Normal rate. Rhythm irregular.   Pulmonary:      Breath sounds: Wheezing and rhonchi present.   Abdominal:      General: Bowel sounds are normal.      Palpations: Abdomen is soft.   Musculoskeletal:      Right lower leg: Edema present.      Left lower leg: Edema present.   Skin:     General: Skin is warm and dry.   Neurological:      Mental Status: He is alert and oriented to person, place, and time. Mental status is at baseline.   Psychiatric:         Mood and Affect: Affect is flat.         Wounds       Wound 05/19/25 0233 Moisture associated dermatitis Left dorsal Foot (Active)   05/19/25 0233 Foot   Present on Original Admission: Y   Primary Wound Type: Moisture ass   Side: Left   Orientation: dorsal        Wound 05/19/25 0231 Moisture associated dermatitis Left lateral Foot (Active)   05/19/25 0231 Foot   Present on Original Admission: Y   Primary Wound Type: Moisture ass   Side: Left   Orientation: lateral        Wound 05/19/25 0231 Pressure Injury Right anterior Ankle (Active)   05/19/25 0231 Ankle   Present on Original Admission: Y   Primary Wound Type: Pressure inj   Side: Right   Orientation: anterior        Wound 05/19/25 0233 Moisture associated dermatitis Left anterior Foot (Active)   05/19/25 0233 Foot   Present on Original Admission:    Primary Wound Type: Moisture ass   Side: Left   Orientation: anterior        Wound 06/04/25 1030 Moisture associated dermatitis Right lateral Abdomen (Active)   06/04/25 1030 Abdomen   Present on Original Admission: Y   Primary Wound Type: Moisture ass   Side: Right   Orientation: lateral        Wound 06/04/25 1030 Moisture associated dermatitis Left lateral Abdomen (Active)   06/04/25 1030 Abdomen   Present on Original Admission: Y   Primary Wound Type: Moisture ass   Side: Left   Orientation: lateral        Wound 06/04/25 1030 Moisture associated dermatitis Right Groin (Active)   06/04/25 1030 Groin   Present on Original Admission: Y   Primary Wound Type:  "Moisture ass   Side: Right        Wound 06/04/25 1030 Moisture associated dermatitis Left Groin (Active)   06/04/25 1030 Groin   Present on Original Admission: Y   Primary Wound Type: Moisture ass   Side: Left        Wound 06/04/25 1030 Moisture associated dermatitis Sacral spine (Active)   06/04/25 1030 Sacral spine   Present on Original Admission: Y   Primary Wound Type: Moisture ass         Wounds consistently followed by [facility] NP Sheree Tao     Labs:  Recent Labs   Lab 06/27/25  0445 06/28/25  0359 06/30/25  0416   WBC 8.77 9.18 11.06   HGB 9.7* 9.0* 9.4*   HCT 30.3* 29.1* 29.4*   PLT 81* 87* 102*     Recent Labs   Lab 06/25/25  0415 06/27/25  0445 06/30/25  0416    137 139   K 3.7 3.7 3.8    101 101   CO2 29 30* 29   BUN 29* 20 23   CREATININE 0.8 0.7 0.8   * 106 92   CALCIUM 8.6* 8.4* 8.8   MG 2.0 2.0 2.0   PHOS 2.7 2.3* 3.0     Recent Labs   Lab 06/25/25  0415 06/27/25  0445 06/30/25  0416   ALKPHOS 95 89 104   ALT 55* 36 36   AST 20 17 26   ALBUMIN 2.0* 1.7* 1.7*   PROT 5.1* 5.0* 5.0*   BILITOT 0.6 0.8 0.7       No results for input(s): "POCTGLUCOSE" in the last 72 hours.      Meds Scheduled:   amiodarone  400 mg Oral BID    Followed by    [START ON 7/1/2025] amiodarone  200 mg Oral Daily    amitriptyline  25 mg Oral QHS    ammonium lactate   Topical (Top) Daily    apixaban  5 mg Oral BID    atorvastatin  80 mg Oral Daily    budesonide  0.5 mg Nebulization Q12H    droNABinol  5 mg Oral BID    fluticasone propionate  2 spray Each Nostril Daily    guaiFENesin  1,200 mg Oral BID    levalbuterol  1.2495 mg Nebulization Q6H    levETIRAcetam  500 mg Oral BID    megestroL  20 mg Oral Daily    pantoprazole  40 mg Oral Daily    roflumilast  500 mcg Oral Daily    sodium chloride 3%  4 mL Nebulization BID    tiotropium bromide  2 puff Inhalation Daily       Current Inpatient Problem List:  Active Hospital Problems    Diagnosis  POA    *Acute on chronic respiratory failure with hypoxia " [J96.21]  Yes    Parainfluenza virus infection [B34.8]  Yes    Pneumonia of right lung due to infectious organism [J18.9]  Yes    Cellulitis [L03.90]  Yes    TIA (transient ischemic attack) [G45.9]  Yes     ASA and statin      Metastasis to brain [C79.31]  Yes    Adenocarcinoma, lung, 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       CAD (coronary artery disease) [I25.10]  Yes     Chronic     -LHC (10/31/13) for stemi: pLAD 100%, Cx with Li's, mRCA 40%, LVEF 55%,. LVEDP 15   -Xience 3.0 x 33 mm to pLAD, post dilated with 3.5 NC and 4.0 NC.   -TTE (8/14/13): LVEF 55%, grade I diastolic dysfunction      HTN (hypertension), benign [I10]  Yes    SHOLA (obstructive sleep apnea) [G47.33]  Yes     CPAP at night      GERD (gastroesophageal reflux disease) [K21.9]  Yes     Continue PPI        Resolved Hospital Problems   No resolved problems to display.         Assessment / Plan:     Acute respiratory failure with hypoxia  COPD with acute exacerbation  Parainfluenza virus infection  Pneumonia   On Symbicort Spiriva and Daliresp at home. Follows up with pulmonology outpatient.   Completed 7 day course of Augmentin and doxycycline on 06/01  Continue Daliresp 500mcg daily  Continue Xopenex p.r.n.   Continue weaning high-flow nasal cannula  Continue guaifenesin 1200 mg b.i.d.  Completed 7 day course of levofloxacin on 6/25  Pulmonology consulted, appreciate recommendations  On 8L nasal cannula this morning with O2 sats 96%    Constipation, new   Last Bowel Movement: 06/28/25  Increasing MiraLax to b.i.d. scheduled      Thrombocytopenia, new   Aspirin and mirtazapine discontinued on 06/27  Platelets are improving, 102 today  Continue trends with scheduled labs       Chest pain, new   Resolved   Evaluated by cardiology while in the hospital  Will need outpatient follow up with   "[facility] with Cardiology     Cellulitis   Wound followed and managed by consistent, contracted [facility] NP  Completed 7 day course of doxy and Augmentin  Will need follow up with Podiatry after discharge     Restless leg syndrome   Continue amitriptyline 25 mg nightly     Adenocarcinoma, lung, left   Metastasis to brain  Completed treatment with chemo/XRT. Brain XRT for lesionsx2. off immunotherapy since 12/24  suspect the LLL area was consolidation of prior RXT changes and not malignancy and is nearly resolved.  CT on presentation with new SANJAY nodule, need op follow up with pulm  Continue prophylactic Keppra 500 mg b.i.d.    Right shoulder pain  Having some shoulder pain to his right shoulder, which seems to be some muscle spasms.    Started Robaxin 500 mg q.i.d. p.r.n. on 6/6    Compression fracture of L1 vertebrae with routine healing   TLSO brace when out of bed  F/u with Dr. Hopkins     Dyslipidemia  TIA  CAD   Holding aspirin due to thrombocytopenia  Continue atorvastatin 80 mg daily     Tachycardia   Episodes of SVT while at the hospital   Evaluated by Cardiology   Likely worsened by underlying hypoxia   Supplemental O2   Uncontrolled AFib with RVR on 06/16, he was placed on diltiazem drip though rate remained difficult to control  Sent back to [facility] and ultimately started on amiodarone for rate control   Continue amiodarone 400 mg b.i.d. for 8 days, then transition to amiodarone 200 mg daily       SHOLA   Continue CPAP nightly     GERD  Continue PPI daily     Left lateral foot wound   Right anterior ankle wound   Traumatic left dorsal foot wound   Left anterior foot venous ulcer   Skin tear right distal arm   Pressure injury nose  Wound followed and managed by consistent, contracted [facility] NP        ACP 6/19  "Dr. Zaldivar and I held discussion with the patient regarding his goals of care. The patient states that he would want to be intubated for mechanical ventilation if he were to decompensate but in the " "event of a cardiac arrest, he would not want CPR and would want to be allowed to die peacefully." Per ALBA Argueta NP  Code status changed to DNR     Psychiatric Assessment  Patient Health Questionnaire-9 (PHQ-9)    Date:  06/05/2025  Total Score: 15  Screening is Positive   Diagnosis and Treatment Plan:  Moderate MDD   Continue amitriptyline 25 mg nightly   Increasing mirtazapine to 15 mg nightly, which will hopefully help with his appetite as well       Diet:  Cardiac   GI Ppx:  PPI   DVT Ppx:  Enoxaparin     Lines:  PIV   Drains:  None   Airways:n/a   Wounds:  Multiple    Goals of Care:   Restorative,  Treat infection  Optimize nutrition  Wound healing  Muscle strengthening  Restoration of ADL's  Improve mobility    Anticipated Disposition:    TBD    Follow up appointments:   Future Appointments   Date Time Provider Department Center   7/1/2025 10:30 AM [facility] OIC-XRAY [facility] XRAY [facility]   7/1/2025 11:30 AM Bolivar Sr PA [facility] NEUROS8 [facility]   7/8/2025  9:15 AM MEKES, VISUAL FIELDS [facility] OPHTHAL [facility]   7/8/2025 10:00 AM Cara Looney MD [facility] OPHTHAL [facility]   7/15/2025 10:00 AM Garrett Lloyd DPM NOM POD [facility] Ort   7/23/2025 10:15 AM CV [facility] ECHO [facility] [place]   7/30/2025  8:30 AM [facility] OIC-MRI1 N[facility] MRI [facility]   7/30/2025 10:00 AM Bienvenido Henson MD [facility] RAD ONC [facility]   8/15/2025  9:20 AM Bienvenido Ward MD [facility] PULMON [place]   8/27/2025  9:00 AM Guido Trejo MD NOM ENT [facility]   9/11/2025  8:30 AM Yan Palmer MD [facility] DERM [facility]         Ernestine Palomino, NP  Hospital Medicine [facility]    I have spent 55 minutes reviewing patient records, examining, and  of the patient/ patient's family with greater than 50% of the time spent with direct patient care and coordination.           "

## 2025-06-30 NOTE — NURSING
NP Ernestine Palomino notified, patient complaining of anxiety and pain not relieved by hydroxyzine or PRN pain medications. Request for LEFT ear to be examined. Increased respiratory support needs. No new orders received at this time.

## 2025-06-30 NOTE — PROGRESS NOTES
Referrals sent to the following places: awaiting response  [facility]  [facility]  [facility]  [facility]   [facility]  [facility]  [facility]  [facility]  [facility]

## 2025-06-30 NOTE — PT/OT/SLP PROGRESS
"Occupational Therapy   Treatment    Name: Jordin Allen Jr.  MRN: [id number]  Admitting Diagnosis:  Acute on chronic respiratory failure with hypoxia       Recommendations:     Discharge Recommendations:  (pending medical status)  Discharge Equipment Recommendations:  other (see comments) (pending progress)  Barriers to discharge:  Decreased caregiver support, Other (Comment) (increased A with ADLs and functional mobility tasks)    Assessment:     Jordin Allen Jr. is a 79 y.o. male with a medical diagnosis of Acute on chronic respiratory failure with hypoxia.  He presents with weakness, impaired endurance, impaired self care skills, impaired functional mobility, gait instability, pain, decreased upper extremity function, decreased lower extremity function, impaired cardiopulmonary response to activity.  Pt with poor tolerance to session on this date, increased work of breath noted with bed mobility and sitting EOB and pt with reported SOB along with intermittent de saturations. Red tinged secretion noted throughout session.  Pt would benefit from conversation regarding overall goal of care/ goals of therapy if medical status continues to decline to determine most appropriate plan of care.     Rehab Prognosis:  Poor; patient would benefit from acute skilled OT services to address these deficits and reach maximum level of function.       Plan:     Patient to be seen 5 x/week to address the above listed problems via self-care/home management, therapeutic activities, therapeutic exercises, neuromuscular re-education  Plan of Care Expires: 07/23/25  Plan of Care Reviewed with: patient    Subjective     Chief Complaint: SOB  Patient/Family Comments/goals: "Its so hard to breath."  Pain/Comfort:  Pain Rating 1: other (see comments) (feeling  SOB)  Location - Side 1: Bilateral  Location - Orientation 1: generalized  Location 1: chest  Pain Addressed 1: Reposition, Distraction, Cessation of Activity, Other (see comments) " (resp notified)  Pain Rating Post-Intervention 1: other (see comments)    Objective:     Communicated with: NSG prior to session.  Patient found supine with oxygen, peripheral IV, telemetry, pulse ox (continuous), bed alarm, blood pressure cuff (6 L) upon OT entry to room.    General Precautions: Standard, fall    Orthopedic Precautions:spinal precautions  Braces: TLSO  Respiratory Status: Nasal cannula, flow 6 L/min, increased to 7 due to SOB, resp notified.      Occupational Performance:     Bed Mobility:    Patient completed Rolling/Turning to Left with  stand by assistance  Patient completed Rolling/Turning to Right with stand by assistance  Patient completed Scooting/Bridging with total assistance and 2 persons  Patient completed Supine to Sit with minimum assistance  Patient completed Sit to Supine with minimum assistance   EOB sitting- 7 minutes with CGA due to posterior lean, A to perform anterior lean to improve breathing and posture.     Functional Mobility/Transfers:  NT secondary to resp status and pt fatigue    Activities of Daily Living:  Grooming: maximal assistance to wash face at bed level to clear secretion       AMPAC 6 Click ADL: 14    Treatment & Education:  Lumbar extension, scap retraction, and cervical extension- 10 reps  Pursed lip breathing- cues for continuous breathing throughout session.   Bicep curls-10 reps       -Role of OT  -OT Poc  -safety awareness and precautions  -Level of A required for ADLs and functional mobility  -AE used for ADL completion  -importance of OOB activity and energy conservation   -transfer training   -pursed lip breathing  -benefit of suction at bedside, RT requested to provide pt suction to A with secretion    Patient left supine with all lines intact, call button in reach, bed alarm on, and RT notified    GOALS:   Multidisciplinary Problems       Occupational Therapy Goals          Problem: Occupational Therapy    Goal Priority Disciplines Outcome  Interventions   Occupational Therapy Goal     OT, PT/OT Progressing    Description: Goals to be met by: 7/23/25  Patient will increase functional independence with ADLs by performing:    UE Dressing with Set-up Assistance.  LE Dressing with SBA using appropriate AE as needed.  Grooming while seated at sink with Set-up Assistance.  Toileting from 3-in-1 commode over toilet with min A for hygiene and clothing management.   Supine to sit with SPV   Step transfer with min A with RW.  Toilet transfer to 3-in-1 commode over toilet with min A with RW.                         Time Tracking:     OT Date of Treatment: 06/30/25  OT Start Time: 1110  OT Stop Time: 1136  OT Total Time (min): 26 min  Billable Minutes:neuro reeducation  18 minutes  Therapeutic Exercise 8 minutes    OT/JOSSUE: OT     Number of JOSSUE visits since last OT visit: 0    6/30/2025

## 2025-06-30 NOTE — CARE UPDATE
Patient changed to high flow nasal cannula due to SOB and decreased saturations. Vince RN notified and ORACIO Lynne was notified via secure chat.     06/30/25 0847   Patient Assessment/Suction   Level of Consciousness (AVPU) alert   Respiratory Effort Slight;Labored   Rhythm/Pattern, Respiratory tachypneic   PRE-TX-O2   Device (Oxygen Therapy) high flow nasal cannula   $ Is the patient on High Flow Oxygen? Yes   Flow (L/min) (Oxygen Therapy) 8   SpO2 98 %   Pulse Oximetry Type Continuous   $ Pulse Oximetry - Multiple Charge Pulse Oximetry - Multiple   Pulse 99   Resp (!) 28

## 2025-06-30 NOTE — PROGRESS NOTES
06/30/25 1015        Wound 06/04/25 1030 Moisture associated dermatitis Sacral spine   Date First Assessed/Time First Assessed: 06/04/25 1030   Present on Original Admission: Yes  Primary Wound Type: (c) Moisture associated dermatitis  Location: (c) Sacral spine   Wound Image     Dressing Appearance Moist drainage   Drainage Amount Scant   Drainage Characteristics/Odor Serosanguineous   Appearance Pink;Red   Red (%), Wound Tissue Color 100 %   Periwound Area Moist   Care Cleansed with:;Wound cleanser   Dressing   (applied Triad paste, open to air)

## 2025-07-01 PROCEDURE — 94660 CPAP INITIATION&MGMT: CPT

## 2025-07-01 PROCEDURE — 94664 DEMO&/EVAL PT USE INHALER: CPT

## 2025-07-01 PROCEDURE — 25000242 PHARM REV CODE 250 ALT 637 W/ HCPCS: Performed by: STUDENT IN AN ORGANIZED HEALTH CARE EDUCATION/TRAINING PROGRAM

## 2025-07-01 PROCEDURE — 11000001 HC ACUTE MED/SURG PRIVATE ROOM

## 2025-07-01 PROCEDURE — 97535 SELF CARE MNGMENT TRAINING: CPT

## 2025-07-01 PROCEDURE — 25000242 PHARM REV CODE 250 ALT 637 W/ HCPCS: Performed by: FAMILY MEDICINE

## 2025-07-01 PROCEDURE — 97530 THERAPEUTIC ACTIVITIES: CPT

## 2025-07-01 PROCEDURE — 25000003 PHARM REV CODE 250: Performed by: HOSPITALIST

## 2025-07-01 PROCEDURE — 63600175 PHARM REV CODE 636 W HCPCS: Performed by: STUDENT IN AN ORGANIZED HEALTH CARE EDUCATION/TRAINING PROGRAM

## 2025-07-01 PROCEDURE — 25000003 PHARM REV CODE 250: Performed by: NURSE PRACTITIONER

## 2025-07-01 PROCEDURE — 94640 AIRWAY INHALATION TREATMENT: CPT

## 2025-07-01 PROCEDURE — 94799 UNLISTED PULMONARY SVC/PX: CPT

## 2025-07-01 PROCEDURE — 97110 THERAPEUTIC EXERCISES: CPT | Mod: CQ

## 2025-07-01 PROCEDURE — 25000003 PHARM REV CODE 250: Performed by: STUDENT IN AN ORGANIZED HEALTH CARE EDUCATION/TRAINING PROGRAM

## 2025-07-01 PROCEDURE — 27100171 HC OXYGEN HIGH FLOW UP TO 24 HOURS

## 2025-07-01 PROCEDURE — 25000242 PHARM REV CODE 250 ALT 637 W/ HCPCS

## 2025-07-01 PROCEDURE — 27000190 HC CPAP FULL FACE MASK W/VALVE

## 2025-07-01 PROCEDURE — S0179 MEGESTROL 20 MG: HCPCS | Performed by: NURSE PRACTITIONER

## 2025-07-01 PROCEDURE — 25000003 PHARM REV CODE 250

## 2025-07-01 PROCEDURE — 97112 NEUROMUSCULAR REEDUCATION: CPT | Mod: CQ

## 2025-07-01 PROCEDURE — 99900035 HC TECH TIME PER 15 MIN (STAT)

## 2025-07-01 PROCEDURE — 94761 N-INVAS EAR/PLS OXIMETRY MLT: CPT

## 2025-07-01 RX ORDER — OXYCODONE AND ACETAMINOPHEN 5; 325 MG/1; MG/1
1 TABLET ORAL EVERY 4 HOURS PRN
Refills: 0 | Status: DISCONTINUED | OUTPATIENT
Start: 2025-07-01 | End: 2025-07-02

## 2025-07-01 RX ORDER — MORPHINE SULFATE 2 MG/ML
2 INJECTION, SOLUTION INTRAMUSCULAR; INTRAVENOUS ONCE
Status: COMPLETED | OUTPATIENT
Start: 2025-07-01 | End: 2025-07-01

## 2025-07-01 RX ADMIN — Medication: at 08:07

## 2025-07-01 RX ADMIN — TIOTROPIUM BROMIDE INHALATION SPRAY 2 PUFF: 3.12 SPRAY, METERED RESPIRATORY (INHALATION) at 08:07

## 2025-07-01 RX ADMIN — LEVALBUTEROL HYDROCHLORIDE 1.25 MG: 0.63 SOLUTION RESPIRATORY (INHALATION) at 01:07

## 2025-07-01 RX ADMIN — MEGESTROL ACETATE 20 MG: 40 SUSPENSION ORAL at 08:07

## 2025-07-01 RX ADMIN — PANTOPRAZOLE SODIUM 40 MG: 40 TABLET, DELAYED RELEASE ORAL at 08:07

## 2025-07-01 RX ADMIN — GUAIFENESIN 1200 MG: 600 TABLET, MULTILAYER, EXTENDED RELEASE ORAL at 08:07

## 2025-07-01 RX ADMIN — CETIRIZINE HYDROCHLORIDE 10 MG: 10 TABLET, FILM COATED ORAL at 08:07

## 2025-07-01 RX ADMIN — HYDROXYZINE HYDROCHLORIDE 25 MG: 25 TABLET, FILM COATED ORAL at 08:07

## 2025-07-01 RX ADMIN — AMITRIPTYLINE HYDROCHLORIDE 25 MG: 25 TABLET, FILM COATED ORAL at 08:07

## 2025-07-01 RX ADMIN — LEVALBUTEROL HYDROCHLORIDE 1.25 MG: 0.63 SOLUTION RESPIRATORY (INHALATION) at 07:07

## 2025-07-01 RX ADMIN — Medication 4 ML: at 05:07

## 2025-07-01 RX ADMIN — METHOCARBAMOL 500 MG: 500 TABLET ORAL at 08:07

## 2025-07-01 RX ADMIN — MELATONIN TAB 3 MG 6 MG: 3 TAB at 08:07

## 2025-07-01 RX ADMIN — Medication 4 ML: at 07:07

## 2025-07-01 RX ADMIN — LEVETIRACETAM 500 MG: 500 TABLET, FILM COATED ORAL at 08:07

## 2025-07-01 RX ADMIN — APIXABAN 5 MG: 5 TABLET, FILM COATED ORAL at 08:07

## 2025-07-01 RX ADMIN — LEVALBUTEROL HYDROCHLORIDE 1.25 MG: 0.63 SOLUTION RESPIRATORY (INHALATION) at 12:07

## 2025-07-01 RX ADMIN — OXYCODONE HYDROCHLORIDE AND ACETAMINOPHEN 1 TABLET: 5; 325 TABLET ORAL at 10:07

## 2025-07-01 RX ADMIN — POLYETHYLENE GLYCOL 3350 17 G: 17 POWDER, FOR SOLUTION ORAL at 08:07

## 2025-07-01 RX ADMIN — MORPHINE SULFATE 2 MG: 2 INJECTION, SOLUTION INTRAMUSCULAR; INTRAVENOUS at 01:07

## 2025-07-01 RX ADMIN — OXYCODONE HYDROCHLORIDE AND ACETAMINOPHEN 1 TABLET: 5; 325 TABLET ORAL at 08:07

## 2025-07-01 RX ADMIN — LEVALBUTEROL HYDROCHLORIDE 1.25 MG: 0.63 SOLUTION RESPIRATORY (INHALATION) at 05:07

## 2025-07-01 RX ADMIN — ATORVASTATIN CALCIUM 80 MG: 80 TABLET, FILM COATED ORAL at 08:07

## 2025-07-01 RX ADMIN — FLUTICASONE PROPIONATE 100 MCG: 50 SPRAY, METERED NASAL at 08:07

## 2025-07-01 RX ADMIN — BUDESONIDE 0.5 MG: 0.5 SUSPENSION RESPIRATORY (INHALATION) at 05:07

## 2025-07-01 RX ADMIN — ROFLUMILAST 500 MCG: 500 TABLET ORAL at 08:07

## 2025-07-01 RX ADMIN — BUDESONIDE 0.5 MG: 0.5 SUSPENSION RESPIRATORY (INHALATION) at 07:07

## 2025-07-01 RX ADMIN — AMIODARONE HYDROCHLORIDE 200 MG: 200 TABLET ORAL at 08:07

## 2025-07-01 NOTE — PROGRESS NOTES
[facility] Medicine   Progress Note  Room: 207/207   Patient Name: Jordin Allen Jr.  MRN: [id number]  Admit Date: 6/3/2025   Length of Stay:  LOS: 28 days   Attending Physician: Bernardino Guthrie MD  Nurse Practitioner: Ernestine Palomino NP    Date of Service: 07/01/2025    Principal Problem:    Acute on chronic respiratory failure with hypoxia    Brief HPI:     Jordin Allen Jr. is a 79 y.o. 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  He presented to [facility] ED on 01/22/2025 with complaints of shortness of breath and left foot redness.  Admitted to hospital medicine team for left foot cellulitis and acute hypoxic respiratory failure secondary to pneumonia/COPD exacerbation. CTA chest negative for PE. Emphysematous change with superimposed edema. Nodular focus within the anterior aspect of left upper lobe.  Started on nebs, prednisone, and empiric vanc/cefepime and doxycycline.  Respiratory panel positive for parainfluenza virus.  Course further complicated by sinus tach with PACs/18, for which he was started on diltiazem.  Blood cultures positive for staph, suspected to be contaminant.  Repeat blood cultures returned negative.  He will switch to Augmentin and doxy.  Ongoing episodes of SVT, thought to be due to underlying hypoxia.  He was able to be weaned down to high-flow nasal cannula 15 L.  Discharged to [facility] on 06/03/2025 for rehab, wound care, and O2 weaning.     6/9-6/11-sent out to [facility] for concerns of MI. Workup revealed findings concerning for pneumonia on CT. Started on empiric vanc and zosyn. Also started on 5 day course of prednisone for COPD exacerbation.    6/18 - 6/22-sent out to [facility] again for increasing O2 requirements and worsening AFib with RVR on diltiazem drip.  Use switch to amiodarone drip at [facility],  "and transitioned to p.o. amiodarone successfully.  Infectious workup revealed concerns for aspiration pneumonia.  He was treated with vanc, cefepime, and levofloxacin.  Antibiotics de-escalated to oral levofloxacin.  He was weaned from BiPAP to nasal cannula at 3-5 L.      Interval History    Reports feeling too SOB to eat this morning. Giving prn oxycodone for air hunger  Remains on 8 L high-flow nasal cannula with O2 sats 96%  Discussed goals of care with patient.  I asked him if he had ever discussed hospice with anyone, he states no one has talked to him about hospice in the past, but he is familiar with hospice.  He states that he understands that he is likely at the end of life, and he "just wants to be comfortable". He would like to include his daughter on further discussions regarding hospice. Discussed with attending MD, may also be able to reach out to palliative care to see if patient could be transferred to HPU at [facility]. Giving dose of morphine 2mg for air hunger, and will reach out to palliative care today for further assistance.      Subjective:     Review of Systems   Constitutional:  Positive for malaise/fatigue.   Respiratory:  Negative for cough, sputum production and shortness of breath.    Cardiovascular:  Negative for chest pain and leg swelling.   Gastrointestinal:  Negative for heartburn, nausea and vomiting.   Genitourinary:  Negative for dysuria and urgency.   Musculoskeletal:  Positive for joint pain (R shoulder). Negative for myalgias.   Neurological:  Positive for weakness. Negative for dizziness.   Psychiatric/Behavioral:  Negative for depression. The patient does not have insomnia.          Objective:     Vital Sign Range  Temp:  [97.4 °F (36.3 °C)-98 °F (36.7 °C)]   Pulse:  []   Resp:  [11-26]   BP: (106-141)/(59-68)   SpO2:  [90 %-100 %]     Body mass index is 27.64 kg/m².  Oxygen Concentration (%):  [8] 8        Intake/Output Summary (Last 24 hours) at 7/1/2025 0898  Last data " filed at 6/30/2025 2249  Gross per 24 hour   Intake 580 ml   Output 925 ml   Net -345 ml       Physical Exam  Constitutional:       Appearance: He is ill-appearing.   HENT:      Head: Normocephalic and atraumatic.      Mouth/Throat:      Mouth: Mucous membranes are moist.      Pharynx: Oropharynx is clear.   Eyes:      Extraocular Movements: Extraocular movements intact.      Pupils: Pupils are equal, round, and reactive to light.   Cardiovascular:      Rate and Rhythm: Normal rate. Rhythm irregular.   Pulmonary:      Breath sounds: Wheezing and rhonchi present.   Abdominal:      General: Bowel sounds are normal.      Palpations: Abdomen is soft.   Musculoskeletal:      Right lower leg: Edema present.      Left lower leg: Edema present.   Skin:     General: Skin is warm and dry.   Neurological:      Mental Status: He is alert and oriented to person, place, and time. Mental status is at baseline.   Psychiatric:         Mood and Affect: Affect is flat.         Wounds       Wound 05/19/25 0233 Moisture associated dermatitis Left dorsal Foot (Active)   05/19/25 0233 Foot   Present on Original Admission: Y   Primary Wound Type: Moisture ass   Side: Left   Orientation: dorsal        Wound 05/19/25 0231 Moisture associated dermatitis Left lateral Foot (Active)   05/19/25 0231 Foot   Present on Original Admission: Y   Primary Wound Type: Moisture ass   Side: Left   Orientation: lateral        Wound 05/19/25 0231 Pressure Injury Right anterior Ankle (Active)   05/19/25 0231 Ankle   Present on Original Admission: Y   Primary Wound Type: Pressure inj   Side: Right   Orientation: anterior        Wound 05/19/25 0233 Moisture associated dermatitis Left anterior Foot (Active)   05/19/25 0233 Foot   Present on Original Admission:    Primary Wound Type: Moisture ass   Side: Left   Orientation: anterior        Wound 06/04/25 1030 Moisture associated dermatitis Right lateral Abdomen (Active)   06/04/25 1030 Abdomen   Present on  "Original Admission: Y   Primary Wound Type: Moisture ass   Side: Right   Orientation: lateral        Wound 06/04/25 1030 Moisture associated dermatitis Left lateral Abdomen (Active)   06/04/25 1030 Abdomen   Present on Original Admission: Y   Primary Wound Type: Moisture ass   Side: Left   Orientation: lateral        Wound 06/04/25 1030 Moisture associated dermatitis Right Groin (Active)   06/04/25 1030 Groin   Present on Original Admission: Y   Primary Wound Type: Moisture ass   Side: Right        Wound 06/04/25 1030 Moisture associated dermatitis Left Groin (Active)   06/04/25 1030 Groin   Present on Original Admission: Y   Primary Wound Type: Moisture ass   Side: Left        Wound 06/04/25 1030 Moisture associated dermatitis Sacral spine (Active)   06/04/25 1030 Sacral spine   Present on Original Admission: Y   Primary Wound Type: Moisture ass         Wounds consistently followed by [facility] NP Sheree Tao     Labs:  Recent Labs   Lab 06/27/25  0445 06/28/25  0359 06/30/25  0416   WBC 8.77 9.18 11.06   HGB 9.7* 9.0* 9.4*   HCT 30.3* 29.1* 29.4*   PLT 81* 87* 102*     Recent Labs   Lab 06/25/25  0415 06/27/25  0445 06/30/25  0416    137 139   K 3.7 3.7 3.8    101 101   CO2 29 30* 29   BUN 29* 20 23   CREATININE 0.8 0.7 0.8   * 106 92   CALCIUM 8.6* 8.4* 8.8   MG 2.0 2.0 2.0   PHOS 2.7 2.3* 3.0     Recent Labs   Lab 06/25/25  0415 06/27/25  0445 06/30/25  0416   ALKPHOS 95 89 104   ALT 55* 36 36   AST 20 17 26   ALBUMIN 2.0* 1.7* 1.7*   PROT 5.1* 5.0* 5.0*   BILITOT 0.6 0.8 0.7       No results for input(s): "POCTGLUCOSE" in the last 72 hours.      Meds Scheduled:   amiodarone  200 mg Oral Daily    amitriptyline  25 mg Oral QHS    ammonium lactate   Topical (Top) Daily    apixaban  5 mg Oral BID    atorvastatin  80 mg Oral Daily    budesonide  0.5 mg Nebulization Q12H    cetirizine  10 mg Oral Daily    fluticasone propionate  2 spray Each Nostril Daily    guaiFENesin  1,200 mg Oral BID "    levalbuterol  1.2495 mg Nebulization Q6H    levETIRAcetam  500 mg Oral BID    megestroL  20 mg Oral Daily    pantoprazole  40 mg Oral Daily    polyethylene glycol  17 g Oral BID    roflumilast  500 mcg Oral Daily    sodium chloride 3%  4 mL Nebulization BID    tiotropium bromide  2 puff Inhalation Daily       Current Inpatient Problem List:  Active Hospital Problems    Diagnosis  POA    *Acute on chronic respiratory failure with hypoxia [J96.21]  Yes    Parainfluenza virus infection [B34.8]  Yes    Pneumonia of right lung due to infectious organism [J18.9]  Yes    Cellulitis [L03.90]  Yes    TIA (transient ischemic attack) [G45.9]  Yes     ASA and statin      Metastasis to brain [C79.31]  Yes    Adenocarcinoma, lung, 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       CAD (coronary artery disease) [I25.10]  Yes     Chronic     -LHC (10/31/13) for stemi: pLAD 100%, Cx with Li's, mRCA 40%, LVEF 55%,. LVEDP 15   -Xience 3.0 x 33 mm to pLAD, post dilated with 3.5 NC and 4.0 NC.   -TTE (8/14/13): LVEF 55%, grade I diastolic dysfunction      HTN (hypertension), benign [I10]  Yes    SHOLA (obstructive sleep apnea) [G47.33]  Yes     CPAP at night      GERD (gastroesophageal reflux disease) [K21.9]  Yes     Continue PPI        Resolved Hospital Problems   No resolved problems to display.         Assessment / Plan:     Acute respiratory failure with hypoxia  COPD with acute exacerbation  Parainfluenza virus infection  Pneumonia   On Symbicort Spiriva and Daliresp at home. Follows up with pulmonology outpatient.   Completed 7 day course of Augmentin and doxycycline on 06/01  Continue Daliresp 500mcg daily  Continue Xopenex p.r.n.   Continue weaning high-flow nasal cannula  Continue guaifenesin 1200 mg b.i.d.  Completed 7 day course of levofloxacin on 6/25  Pulmonology  consulted, appreciate recommendations  On 8L nasal cannula this morning with O2 sats 96%    Constipation, new   Last Bowel Movement: 06/28/25  Increasing MiraLax to b.i.d. scheduled      Thrombocytopenia, new   Aspirin and mirtazapine discontinued on 06/27  Platelets are improving, 102  Continue trends with scheduled labs       Chest pain, new   Resolved   Evaluated by cardiology while in the hospital  Will need outpatient follow up with Dr. Ba with Cardiology     Cellulitis   Wound followed and managed by consistent, contracted [facility] NP  Completed 7 day course of doxy and Augmentin  Will need follow up with Podiatry after discharge     Restless leg syndrome   Continue amitriptyline 25 mg nightly     Adenocarcinoma, lung, left   Metastasis to brain  Completed treatment with chemo/XRT. Brain XRT for lesionsx2. off immunotherapy since 12/24  suspect the LLL area was consolidation of prior RXT changes and not malignancy and is nearly resolved.  CT on presentation with new SANJAY nodule, need op follow up with pulm  Continue prophylactic Keppra 500 mg b.i.d.    Right shoulder pain  Having some shoulder pain to his right shoulder, which seems to be some muscle spasms.    Started Robaxin 500 mg q.i.d. p.r.n. on 6/6    Compression fracture of L1 vertebrae with routine healing   TLSO brace when out of bed  F/u with Dr. Hopkins     Dyslipidemia  TIA  CAD   Holding aspirin due to thrombocytopenia  Continue atorvastatin 80 mg daily     Tachycardia   Episodes of SVT while at the hospital   Evaluated by Cardiology   Likely worsened by underlying hypoxia   Supplemental O2   Uncontrolled AFib with RVR on 06/16, he was placed on diltiazem drip though rate remained difficult to control  Sent back to [facility] and ultimately started on amiodarone for rate control   Continue amiodarone 400 mg b.i.d. for 8 days, then transition to amiodarone 200 mg daily       SHOLA   Continue CPAP nightly     GERD  Continue PPI daily     Left lateral foot  "wound   Right anterior ankle wound   Traumatic left dorsal foot wound   Left anterior foot venous ulcer   Skin tear right distal arm   Pressure injury nose  Wound followed and managed by consistent, contracted [facility] NP    ACP, 7/1/25  This was a voluntary visit and the option to decline counseling was given  Length of discussion: 16 minutes  Life limiting problem:  Respiratory failure  Topics discussed:  Hospice  Outcome of discussion: He states that he understands that he is likely at the end of life, and he "just wants to be comfortable". He would like to include his daughter on further discussions regarding hospice. Discussed with attending MD, may also be able to reach out to palliative care to see if patient could be transferred to [facility]. Giving dose of morphine 2mg for air hunger, and will reach out to palliative care today for further assistance.  Decision Maker: Jordin Allen          ACP 6/19  "Dr. Zaldivar and I held discussion with the patient regarding his goals of care. The patient states that he would want to be intubated for mechanical ventilation if he were to decompensate but in the event of a cardiac arrest, he would not want CPR and would want to be allowed to die peacefully." Per ALBA Argueta NP  Code status changed to DNR     Psychiatric Assessment  Patient Health Questionnaire-9 (PHQ-9)    Date:  06/05/2025  Total Score: 15  Screening is Positive   Diagnosis and Treatment Plan:  Moderate MDD   Continue amitriptyline 25 mg nightly   Increasing mirtazapine to 15 mg nightly, which will hopefully help with his appetite as well       Diet:  Cardiac   GI Ppx:  PPI   DVT Ppx:  Enoxaparin     Lines:  PIV   Drains:  None   Airways:n/a   Wounds:  Multiple    Goals of Care:   Restorative,  Treat infection  Optimize nutrition  Wound healing  Muscle strengthening  Restoration of ADL's  Improve mobility    Anticipated Disposition:    TBD    Follow up appointments:   Future Appointments   Date Time " Provider Department Center   7/1/2025 10:30 AM NOM OIC-XRAY [facility] XRAY [facility]   7/1/2025 11:30 AM Bolivar Sr PA [facility] NEUROS8 [facility]   7/8/2025  9:15 AM MEEKS, VISUAL FIELDS NOM OPHTHAL [facility]   7/8/2025 10:00 AM Cara Looney MD [facility] OPHTHAL [facility]   7/15/2025 10:00 AM Garrett Lloyd DPM [facility] POD [facility] Ort   7/23/2025 10:15 AM CV [facility] ECHO [facility] [place]   7/30/2025  8:30 AM NOM OIC-MRI1 [facility] MRI [facility]   7/30/2025 10:00 AM Bienvenido Henson MD [facility] RAD ONC [facility]   8/15/2025  9:20 AM Bienvenido Ward MD [facility] PULMON [place]   8/27/2025  9:00 AM Guido Trejo MD NOM ENT [facility]   9/11/2025  8:30 AM Yan Palmer MD [facility] DERM [facility]         Ernestine Palomino, NP  Hospital Medicine [facility]    I have spent 55 minutes reviewing patient records (not including ACP time), examining, and  of the patient/ patient's family with greater than 50% of the time spent with direct patient care and coordination.

## 2025-07-01 NOTE — PT/OT/SLP RE-EVAL
PT change of frequency    Patient frequency changed to 3 times a week due to decline in medical condition which is not allowing him to participate daily.

## 2025-07-01 NOTE — PLAN OF CARE
Problem: Occupational Therapy  Goal: Occupational Therapy Goal  Description: Goals to be met by: 7/23/25  Patient will increase functional independence with ADLs by performing:    UE Dressing with Set-up Assistance. Discontinued  LE Dressing with SBA using appropriate AE as needed. Discontinued  Grooming while seated at sink with Set-up Assistance. Discontinued  Toileting from 3-in-1 commode over toilet with min A for hygiene and clothing management. Discontinued  Supine to sit with SPV . Discontinued  Step transfer with min A with RW. Discontinued  Toilet transfer to 3-in-1 commode over toilet with min A with RW. Discontinued    Revised goals as of 7/1/25  Pt will tolerate bed level ADLs, such as grooming and oral hygiene with minimal cueing for pursed lip breathing.   Pt will tolerate bed level active and passive range of motion program to BUE to maintain joint integrity, prevent stiffness, and decrease pain.  Pt and family will be educated in optimal positioning and demo teach back understanding to prevent deformity and maintain joint integrity   Pt will be educated in coping and stress management strategies and demo use of those strategies during functional tasks.     Outcome: Progressing      POC revised to reflect pt's current functional status and goal of care, see noted dated 7/1/25 for additional details

## 2025-07-01 NOTE — PT/OT/SLP PROGRESS
Occupational Therapy   Co-Treatment    Name: Jordin Allen Jr.  MRN: [id number]  Admitting Diagnosis:  Acute on chronic respiratory failure with hypoxia       Recommendations:     Discharge Recommendations: Moderate Intensity Therapy  Discharge Equipment Recommendations:  other (see comments) (TBD)  Barriers to discharge:  Inaccessible home environment, Decreased caregiver support, Other (Comment) (increased A with ADLs and functional mobility tasks)    Assessment:     Jordin Allen Jr. is a 79 y.o. male with a medical diagnosis of Acute on chronic respiratory failure with hypoxia.  He presents with weakness, impaired endurance, impaired self care skills, impaired functional mobility, gait instability, pain, decreased upper extremity function, decreased lower extremity function, impaired cardiopulmonary response to activity.  Pt with poor tolerance to session on this date, unable to tolerate sitting EOB for longer than 2 minutes without increased SOB and anxiety, prompting therapist to return him to supine and increase O2. Pt engaged in therapeutic conversation with therapist regarding goal of care, and pt expressed that sitting EOB and attempting functional mobility was not important to him as it made it too hard to breathe. Pt still interested in bed level ADLs and AROM/PROM, so POC has been adjusted to reflect pt's priorities. Pt educated that if goals continue to change, to make treating therapist aware.     Co-treatment performed due to patient's multiple deficits requiring two skilled therapists to appropriately and safely assess patient's strength, endurance, functional mobility, and ADL performance while facilitating functional tasks in addition to accommodating for patient's activity tolerance and medical acuity.      Rehab Prognosis:  Poor; patient would benefit from acute skilled OT services to address these deficits and reach maximum level of function.       Plan:     Patient to be seen 3 x/week to  "address the above listed problems via self-care/home management, therapeutic activities, therapeutic exercises, neuromuscular re-education  Plan of Care Expires: 07/23/25  Plan of Care Reviewed with: patient    Subjective     Chief Complaint: SOB  Patient/Family Comments/goals: "I just want to be able to breath."  Pain/Comfort:  Pain Rating 1: 0/10    Objective:     Communicated with: NSG prior to session.  Patient found HOB elevated with blood pressure cuff, oxygen, peripheral IV, pulse ox (continuous), telemetry upon OT entry to room.    General Precautions: Standard, fall    Orthopedic Precautions:spinal precautions  Braces: TLSO  Respiratory Status: Nasal cannula, flow 8 L/min     Occupational Performance:     Bed Mobility:    Patient completed Rolling/Turning to Left with  stand by assistance  Patient completed Rolling/Turning to Right with stand by assistance  Patient completed Scooting/Bridging with total assistance and 2 persons  Patient completed Supine to Sit with minimum assistance  Patient completed Sit to Supine with minimum assistance     Activities of Daily Living:  Grooming: supervision at bed level, frequent breaks  Toileting: supervision with urinal at bed level      [facility] 6 Click ADL: 14    Treatment & Education:  -Role of OT  -OT Poc  -safety awareness and precautions  -Level of A required for ADLs and functional mobility  -AE used for ADL completion  -importance of OOB activity and energy conservation   -transfer training   -goal of care discussion, emphaszing that OT is there to work on what is important to pt. Pt expressed he would rather not work on sitting EOB and prioritizes comfort, so POC adjusted accordingly.     Patient left HOB elevated with all lines intact, call button in reach, and bed alarm on    GOALS:   Multidisciplinary Problems       Occupational Therapy Goals          Problem: Occupational Therapy    Goal Priority Disciplines Outcome Interventions   Occupational Therapy Goal "     OT, PT/OT Progressing    Description: Goals to be met by: 7/23/25  Patient will increase functional independence with ADLs by performing:    UE Dressing with Set-up Assistance. Discontinued  LE Dressing with SBA using appropriate AE as needed. Discontinued  Grooming while seated at sink with Set-up Assistance. Discontinued  Toileting from 3-in-1 commode over toilet with min A for hygiene and clothing management. Discontinued  Supine to sit with SPV . Discontinued  Step transfer with min A with RW. Discontinued  Toilet transfer to 3-in-1 commode over toilet with min A with RW. Discontinued    Revised goals as of 7/1/25  Pt will tolerate bed level ADLs, such as grooming and oral hygiene with minimal cueing for pursed lip breathing.   Pt will tolerate bed level active and passive range of motion program to BUE to maintain joint integrity, prevent stiffness, and decrease pain.  Pt and family will be educated in optimal positioning and demo teach back understanding to prevent deformity and maintain joint integrity   Pt will be educated in coping and stress management strategies and demo use of those strategies during functional tasks.                            Time Tracking:     OT Date of Treatment: 07/01/25  OT Start Time: 0930  OT Stop Time: 0954  OT Total Time (min): 24 min    Billable Minutes:Self Care/Home Management 12 minutes  Therapeutic Activity 12 minutes    OT/JOSSUE: OT     Number of JOSSUE visits since last OT visit: 0    7/1/2025

## 2025-07-01 NOTE — CARE UPDATE
06/30/25 1905   Patient Assessment/Suction   Level of Consciousness (AVPU) alert   Respiratory Effort Unlabored   Expansion/Accessory Muscles/Retractions no use of accessory muscles   All Lung Fields Breath Sounds diminished   Rhythm/Pattern, Respiratory unlabored;pattern regular   Cough Frequency no cough   PRE-TX-O2   Device (Oxygen Therapy) high flow nasal cannula   Flow (L/min) (Oxygen Therapy) 8   SpO2 100 %   Pulse Oximetry Type Continuous   Pulse 86   Resp 16   Aerosol Therapy   $ Aerosol Therapy Charges Aerosol Treatment   Daily Review of Necessity (SVN) completed   Respiratory Treatment Status (SVN) given   Treatment Route (SVN) mask   Patient Position HOB elevated   Post Treatment Assessment (SVN) breath sounds unchanged   Signs of Intolerance (SVN) none   Breath Sounds Post-Respiratory Treatment   Throughout All Fields Post-Treatment All Fields   Throughout All Fields Post-Treatment aeration increased   Post-treatment Heart Rate (beats/min) 86   Post-treatment Resp Rate (breaths/min) 15   Preset CPAP/BiPAP Settings   Mode Of Delivery CPAP;home unit;standby   CPAP/BIPAP charged w/in last 24 h YES

## 2025-07-01 NOTE — PLAN OF CARE
Pt continues to have shortness of breath. He has been very drowsy all day and unable to eat. He states he isn't hungry, but he was too out of breath to eat this morning. NP notified.One time dose of morphine administered today with moderate relief of air hunger. Pt states he is tired and just wants to be comfortable. PIV removed today, midline intact. No visitors today. POC reviewed with Mr. Tsang at bedside.

## 2025-07-01 NOTE — CARE UPDATE
Placed patient on Home CPAP with 8 lpm, patient started to de sat, proceeded to go up on the liter flow to 10 lpm. Patient SpO2 was still in the low 80's. Placed patient back on 8 HF NC, patient SpO2 is now in the upper 90'S will continue to monitor.

## 2025-07-01 NOTE — PROGRESS NOTES
07/01/25 1030        Wound 06/04/25 1030 Moisture associated dermatitis Sacral spine   Date First Assessed/Time First Assessed: 06/04/25 1030   Present on Original Admission: Yes  Primary Wound Type: (c) Moisture associated dermatitis  Location: (c) Sacral spine   Dressing Appearance Open to air   Drainage Amount None   Periwound Area Moist   Care Cleansed with:;Wound cleanser   Dressing   (applied Triad paste, open to air)

## 2025-07-01 NOTE — NURSING
Patient ia AAOx4 with complaint of pain of the right ear.  Patient reports pain at pain level 7/10.  Patient requested pain medication and denied any other complaints at this time.

## 2025-07-02 PROBLEM — Z51.5 COMFORT MEASURES ONLY STATUS: Status: ACTIVE | Noted: 2025-07-02

## 2025-07-02 LAB
ABSOLUTE EOSINOPHIL (OHS): 0.08 K/UL
ABSOLUTE MONOCYTE (OHS): 0.87 K/UL (ref 0.3–1)
ABSOLUTE NEUTROPHIL COUNT (OHS): 8.34 K/UL (ref 1.8–7.7)
ALBUMIN SERPL BCP-MCNC: 1.7 G/DL (ref 3.5–5.2)
ALP SERPL-CCNC: 88 UNIT/L (ref 40–150)
ALT SERPL W/O P-5'-P-CCNC: 26 UNIT/L (ref 10–44)
ANION GAP (OHS): 10 MMOL/L (ref 8–16)
AST SERPL-CCNC: 21 UNIT/L (ref 11–45)
BASOPHILS # BLD AUTO: 0.04 K/UL
BASOPHILS NFR BLD AUTO: 0.4 %
BILIRUB SERPL-MCNC: 0.8 MG/DL (ref 0.1–1)
BUN SERPL-MCNC: 24 MG/DL (ref 8–23)
CALCIUM SERPL-MCNC: 8.6 MG/DL (ref 8.7–10.5)
CHLORIDE SERPL-SCNC: 101 MMOL/L (ref 95–110)
CO2 SERPL-SCNC: 27 MMOL/L (ref 23–29)
CREAT SERPL-MCNC: 0.7 MG/DL (ref 0.5–1.4)
ERYTHROCYTE [DISTWIDTH] IN BLOOD BY AUTOMATED COUNT: 17.2 % (ref 11.5–14.5)
GFR SERPLBLD CREATININE-BSD FMLA CKD-EPI: >60 ML/MIN/1.73/M2
GLUCOSE SERPL-MCNC: 75 MG/DL (ref 70–110)
HCT VFR BLD AUTO: 27.4 % (ref 40–54)
HGB BLD-MCNC: 8.8 GM/DL (ref 14–18)
IMM GRANULOCYTES # BLD AUTO: 0.37 K/UL (ref 0–0.04)
IMM GRANULOCYTES NFR BLD AUTO: 3.7 % (ref 0–0.5)
LYMPHOCYTES # BLD AUTO: 0.36 K/UL (ref 1–4.8)
MAGNESIUM SERPL-MCNC: 2.1 MG/DL (ref 1.6–2.6)
MCH RBC QN AUTO: 27.9 PG (ref 27–31)
MCHC RBC AUTO-ENTMCNC: 32.1 G/DL (ref 32–36)
MCV RBC AUTO: 87 FL (ref 82–98)
NUCLEATED RBC (/100WBC) (OHS): 0 /100 WBC
PHOSPHATE SERPL-MCNC: 2.6 MG/DL (ref 2.7–4.5)
PLATELET # BLD AUTO: 125 K/UL (ref 150–450)
PMV BLD AUTO: 10.6 FL (ref 9.2–12.9)
POTASSIUM SERPL-SCNC: 4.1 MMOL/L (ref 3.5–5.1)
PROT SERPL-MCNC: 5.1 GM/DL (ref 6–8.4)
RBC # BLD AUTO: 3.15 M/UL (ref 4.6–6.2)
RELATIVE EOSINOPHIL (OHS): 0.8 %
RELATIVE LYMPHOCYTE (OHS): 3.6 % (ref 18–48)
RELATIVE MONOCYTE (OHS): 8.6 % (ref 4–15)
RELATIVE NEUTROPHIL (OHS): 82.9 % (ref 38–73)
SODIUM SERPL-SCNC: 138 MMOL/L (ref 136–145)
TB INDURATION 48 - 72 HR READ: NORMAL
TB SKIN TEST 48 - 72 HR READ: NEGATIVE
WBC # BLD AUTO: 10.06 K/UL (ref 3.9–12.7)

## 2025-07-02 PROCEDURE — 25000003 PHARM REV CODE 250: Performed by: STUDENT IN AN ORGANIZED HEALTH CARE EDUCATION/TRAINING PROGRAM

## 2025-07-02 PROCEDURE — 84100 ASSAY OF PHOSPHORUS: CPT | Performed by: STUDENT IN AN ORGANIZED HEALTH CARE EDUCATION/TRAINING PROGRAM

## 2025-07-02 PROCEDURE — 25000003 PHARM REV CODE 250: Performed by: HOSPITALIST

## 2025-07-02 PROCEDURE — 94761 N-INVAS EAR/PLS OXIMETRY MLT: CPT

## 2025-07-02 PROCEDURE — 99233 SBSQ HOSP IP/OBS HIGH 50: CPT | Mod: ,,, | Performed by: INTERNAL MEDICINE

## 2025-07-02 PROCEDURE — 36415 COLL VENOUS BLD VENIPUNCTURE: CPT | Performed by: STUDENT IN AN ORGANIZED HEALTH CARE EDUCATION/TRAINING PROGRAM

## 2025-07-02 PROCEDURE — 94640 AIRWAY INHALATION TREATMENT: CPT

## 2025-07-02 PROCEDURE — 25000003 PHARM REV CODE 250

## 2025-07-02 PROCEDURE — 27100171 HC OXYGEN HIGH FLOW UP TO 24 HOURS

## 2025-07-02 PROCEDURE — 63600175 PHARM REV CODE 636 W HCPCS: Performed by: STUDENT IN AN ORGANIZED HEALTH CARE EDUCATION/TRAINING PROGRAM

## 2025-07-02 PROCEDURE — 99900035 HC TECH TIME PER 15 MIN (STAT)

## 2025-07-02 PROCEDURE — S0179 MEGESTROL 20 MG: HCPCS | Performed by: NURSE PRACTITIONER

## 2025-07-02 PROCEDURE — 83735 ASSAY OF MAGNESIUM: CPT | Performed by: STUDENT IN AN ORGANIZED HEALTH CARE EDUCATION/TRAINING PROGRAM

## 2025-07-02 PROCEDURE — 97110 THERAPEUTIC EXERCISES: CPT | Mod: CQ

## 2025-07-02 PROCEDURE — 11000001 HC ACUTE MED/SURG PRIVATE ROOM

## 2025-07-02 PROCEDURE — 25000003 PHARM REV CODE 250: Performed by: NURSE PRACTITIONER

## 2025-07-02 PROCEDURE — 25000242 PHARM REV CODE 250 ALT 637 W/ HCPCS: Performed by: FAMILY MEDICINE

## 2025-07-02 PROCEDURE — 25000242 PHARM REV CODE 250 ALT 637 W/ HCPCS: Performed by: STUDENT IN AN ORGANIZED HEALTH CARE EDUCATION/TRAINING PROGRAM

## 2025-07-02 PROCEDURE — 80053 COMPREHEN METABOLIC PANEL: CPT | Performed by: STUDENT IN AN ORGANIZED HEALTH CARE EDUCATION/TRAINING PROGRAM

## 2025-07-02 PROCEDURE — 25000242 PHARM REV CODE 250 ALT 637 W/ HCPCS

## 2025-07-02 PROCEDURE — 97110 THERAPEUTIC EXERCISES: CPT

## 2025-07-02 PROCEDURE — 85025 COMPLETE CBC W/AUTO DIFF WBC: CPT | Performed by: STUDENT IN AN ORGANIZED HEALTH CARE EDUCATION/TRAINING PROGRAM

## 2025-07-02 RX ORDER — LORAZEPAM 2 MG/ML
1 INJECTION INTRAMUSCULAR EVERY 4 HOURS PRN
Status: DISCONTINUED | OUTPATIENT
Start: 2025-07-02 | End: 2025-07-10 | Stop reason: HOSPADM

## 2025-07-02 RX ORDER — MORPHINE SULFATE 2 MG/ML
2 INJECTION, SOLUTION INTRAMUSCULAR; INTRAVENOUS EVERY 4 HOURS PRN
Status: DISCONTINUED | OUTPATIENT
Start: 2025-07-02 | End: 2025-07-10 | Stop reason: HOSPADM

## 2025-07-02 RX ORDER — OXYCODONE HYDROCHLORIDE 5 MG/1
5 TABLET ORAL EVERY 4 HOURS PRN
Refills: 0 | Status: DISCONTINUED | OUTPATIENT
Start: 2025-07-02 | End: 2025-07-10 | Stop reason: HOSPADM

## 2025-07-02 RX ORDER — MORPHINE SULFATE 2 MG/ML
2 INJECTION, SOLUTION INTRAMUSCULAR; INTRAVENOUS EVERY 4 HOURS PRN
Status: DISCONTINUED | OUTPATIENT
Start: 2025-07-02 | End: 2025-07-02

## 2025-07-02 RX ADMIN — AMIODARONE HYDROCHLORIDE 200 MG: 200 TABLET ORAL at 09:07

## 2025-07-02 RX ADMIN — Medication 4 ML: at 07:07

## 2025-07-02 RX ADMIN — ROFLUMILAST 500 MCG: 500 TABLET ORAL at 09:07

## 2025-07-02 RX ADMIN — LEVALBUTEROL HYDROCHLORIDE 1.25 MG: 0.63 SOLUTION RESPIRATORY (INHALATION) at 07:07

## 2025-07-02 RX ADMIN — POLYETHYLENE GLYCOL 3350 17 G: 17 POWDER, FOR SOLUTION ORAL at 09:07

## 2025-07-02 RX ADMIN — ATORVASTATIN CALCIUM 80 MG: 80 TABLET, FILM COATED ORAL at 09:07

## 2025-07-02 RX ADMIN — LEVALBUTEROL HYDROCHLORIDE 1.25 MG: 0.63 SOLUTION RESPIRATORY (INHALATION) at 12:07

## 2025-07-02 RX ADMIN — PANTOPRAZOLE SODIUM 40 MG: 40 TABLET, DELAYED RELEASE ORAL at 09:07

## 2025-07-02 RX ADMIN — LEVETIRACETAM 500 MG: 500 TABLET, FILM COATED ORAL at 09:07

## 2025-07-02 RX ADMIN — TIOTROPIUM BROMIDE INHALATION SPRAY 2 PUFF: 3.12 SPRAY, METERED RESPIRATORY (INHALATION) at 09:07

## 2025-07-02 RX ADMIN — FLUTICASONE PROPIONATE 100 MCG: 50 SPRAY, METERED NASAL at 09:07

## 2025-07-02 RX ADMIN — POLYETHYLENE GLYCOL 3350 17 G: 17 POWDER, FOR SOLUTION ORAL at 08:07

## 2025-07-02 RX ADMIN — APIXABAN 5 MG: 5 TABLET, FILM COATED ORAL at 09:07

## 2025-07-02 RX ADMIN — GUAIFENESIN 1200 MG: 600 TABLET, MULTILAYER, EXTENDED RELEASE ORAL at 08:07

## 2025-07-02 RX ADMIN — GUAIFENESIN 1200 MG: 600 TABLET, MULTILAYER, EXTENDED RELEASE ORAL at 09:07

## 2025-07-02 RX ADMIN — LEVALBUTEROL HYDROCHLORIDE 1.25 MG: 0.63 SOLUTION RESPIRATORY (INHALATION) at 09:07

## 2025-07-02 RX ADMIN — BUDESONIDE 0.5 MG: 0.5 SUSPENSION RESPIRATORY (INHALATION) at 09:07

## 2025-07-02 RX ADMIN — MEGESTROL ACETATE 20 MG: 40 SUSPENSION ORAL at 09:07

## 2025-07-02 RX ADMIN — AMITRIPTYLINE HYDROCHLORIDE 25 MG: 25 TABLET, FILM COATED ORAL at 08:07

## 2025-07-02 RX ADMIN — LEVETIRACETAM 500 MG: 500 TABLET, FILM COATED ORAL at 08:07

## 2025-07-02 RX ADMIN — LEVALBUTEROL HYDROCHLORIDE 1.25 MG: 0.63 SOLUTION RESPIRATORY (INHALATION) at 02:07

## 2025-07-02 RX ADMIN — CETIRIZINE HYDROCHLORIDE 10 MG: 10 TABLET, FILM COATED ORAL at 09:07

## 2025-07-02 RX ADMIN — MORPHINE SULFATE 2 MG: 2 INJECTION, SOLUTION INTRAMUSCULAR; INTRAVENOUS at 12:07

## 2025-07-02 RX ADMIN — BUDESONIDE 0.5 MG: 0.5 SUSPENSION RESPIRATORY (INHALATION) at 07:07

## 2025-07-02 RX ADMIN — Medication: at 09:07

## 2025-07-02 RX ADMIN — Medication 4 ML: at 09:07

## 2025-07-02 NOTE — PROGRESS NOTES
07/02/25 1300        Wound 06/04/25 1030 Moisture associated dermatitis Sacral spine   Date First Assessed/Time First Assessed: 06/04/25 1030   Present on Original Admission: Yes  Primary Wound Type: (c) Moisture associated dermatitis  Location: (c) Sacral spine   Dressing Appearance Open to air   Drainage Amount None   Appearance Moist   Periwound Area Moist;Pink   Care Cleansed with:;Wound cleanser   Dressing   (Triad paste, open to air)

## 2025-07-02 NOTE — PROGRESS NOTES
Nutrition Follow Up Note    General Info/Comments:  Pt continues on a heart healthy diet with Boost Plus Chocolate at all meals. Intake recently reported at 0-25%. Pt states he is not eating well and endorses a poor appetite. LBM 6/28/2025. Current wt 187# - multiple weights taken since change. RD to continue to monitor.    Recommendations:  Recommendation/Intervention:   1. Continue heart healthy diet.   2. Continue Boost Plus at all meals.   3. Monitor and encourage PO intake.   4. Monitor weight changes and labs.    Goal:  Goals: Pt to consistently meet >50% assessed needs through PO and supplement intake by RD follow up.  Nutrition Goal Status: progressing towards goal    Assessment and Plan:  PES Statement  Nutrition PES Problem: Inadequate protein energy intake  Nutrition PES Etiology: Wound healing  Nutrition PES Signs and Symptoms: Wounds (Inconsistent PO intake)  Nutrition PES Status: Worsening    Diet Order:  Current Diet Order: Heart Healthy     Oral Nutrition Supplement: Boost Pkus Chocolate at all meals    Food Allergies:  Food Allergies: NKFA    Spiritual, Cultural Beliefs, Mormon Preferences that Affect Care:  Spiritual, Cultural Beliefs, Mormon Practices, Values that Affect Care: no    Principal Problem:  Acute on chronic respiratory failure with hypoxia    Weights:  Wt Readings from Last 3 Encounters:   07/02/25 85.1 kg (187 lb 9.8 oz)   06/18/25 87.1 kg (192 lb 0.3 oz)   06/11/25 87.4 kg (192 lb 10.9 oz)       Estimated/Assessed Needs:  Energy Calorie Requirements (kcal): 1814 (25 kcal/kg IBW)  Energy Need Method: Kcal/kg (IBW)  Protein Requirements: 102 g (1.4 g/kg IBW)  Fluid Requirements (mL): 1814    Labs:  Lab Results   Component Value Date/Time     07/02/2025 04:28 AM     02/12/2025 02:24 AM    K 4.1 07/02/2025 04:28 AM    K 3.6 02/12/2025 02:24 AM    EGFRNORACEVR >60 07/02/2025 04:28 AM    EGFRNORACEVR >60.0 02/25/2025 10:33 AM    CALCIUM 8.6 (L) 07/02/2025 04:28 AM     CALCIUM 8.9 02/12/2025 02:24 AM    PHOS 2.6 (L) 07/02/2025 04:28 AM    PHOS 2.6 (L) 02/12/2025 02:24 AM    MG 2.1 07/02/2025 04:28 AM    ALBUMIN 1.7 (L) 07/02/2025 04:28 AM    ALBUMIN 3.0 (L) 02/12/2025 02:24 AM    .6 (H) 05/22/2025 12:22 PM    TRIG 64 02/07/2025 05:01 PM    HGBA1C 5.7 (H) 03/06/2024 09:53 AM       Medications:  Scheduled Meds:   amiodarone  200 mg Oral Daily    amitriptyline  25 mg Oral QHS    ammonium lactate   Topical (Top) Daily    budesonide  0.5 mg Nebulization Q12H    cetirizine  10 mg Oral Daily    fluticasone propionate  2 spray Each Nostril Daily    guaiFENesin  1,200 mg Oral BID    levalbuterol  1.2495 mg Nebulization Q6H    levETIRAcetam  500 mg Oral BID    polyethylene glycol  17 g Oral BID    roflumilast  500 mcg Oral Daily    sodium chloride 3%  4 mL Nebulization BID    tiotropium bromide  2 puff Inhalation Daily     Continuous Infusions:  PRN Meds:.  Current Facility-Administered Medications:     acetaminophen, 650 mg, Oral, Q8H PRN    benzonatate, 100 mg, Oral, TID PRN    bisacodyL, 10 mg, Rectal, Daily PRN    calcium carbonate, 500 mg, Oral, TID PRN    hydrOXYzine HCL, 25 mg, Oral, TID PRN    levalbuterol, 1.2495 mg, Nebulization, Q4H PRN    lorazepam, 1 mg, Intravenous, Q4H PRN    melatonin, 6 mg, Oral, Nightly PRN    methocarbamoL, 500 mg, Oral, TID PRN    morphine, 2 mg, Intravenous, Q4H PRN    naloxone, 0.02 mg, Intravenous, PRN    nitroGLYCERIN, 0.4 mg, Sublingual, Q5 Min PRN    ondansetron, 4 mg, Oral, Q6H PRN    oxyCODONE, 5 mg, Oral, Q4H PRN    sodium chloride 0.9%, 10 mL, Intravenous, Q12H PRN    Nutrition Risk:  Nutrition Risk  Level of Risk/Frequency of Follow-up: moderate     Follow Up: Weekly    Monitor and Evaluation:  Monitor and Evaluation  Monitor and Evaluation: Energy intake, Food and beverage intake, Protein intake, Diet order, Food and nutrition knowledge, Weight, Beliefs and attitudes, Electrolyte and renal panel, Gastrointestinal profile,  Glucose/endocrine profile, Inflammatory profile, Lipid profile, Nutrition focused physical findings, Skin

## 2025-07-02 NOTE — NURSING
Vitals stable. Alert and oriented x4. Patient required increase in O2 to 8L N/C. Urinal at bedside. No BM this shift. New order for comfort measures. New orders for PRN IVP morphine and lorazepam received. Cardiac monitoring discontinued. Left arm midline dressing intact; flushes without difficulty. TB skin test read as ordered; normal. Bed in lowest position, wheels locked and call light within reach. Daughter at bedside.

## 2025-07-02 NOTE — PT/OT/SLP PROGRESS
Physical Therapy Treatment    Patient Name:  Jordin Allen Jr.   MRN:  [id number]    Recommendations:     Discharge Recommendations: Moderate Intensity Therapy  Discharge Equipment Recommendations: to be determined by next level of care  Barriers to discharge: Decreased caregiver support    Assessment:     Jordin Allen Jr. is a 79 y.o. male admitted with a medical diagnosis of Acute on chronic respiratory failure with hypoxia.  He presents with the following impairments/functional limitations: impaired endurance, weakness, impaired balance, edema, impaired cardiopulmonary response to activity, decreased lower extremity function, decreased upper extremity function, impaired functional mobility, impaired self care skills, decreased ROM .    Rehab Prognosis: Good; patient would benefit from acute skilled PT services to address these deficits and reach maximum level of function.    Recent Surgery: * No surgery found *      Plan:     During this hospitalization, patient to be seen 5 x/week to address the identified rehab impairments via therapeutic activities, therapeutic exercises, neuromuscular re-education and progress toward the following goals:    Plan of Care Expires:       Subjective     Chief Complaint: Pt c/o SOB and doesn't want to do anything strenuous.  Patient/Family Comments/goals: Pt wants to be comfortable .  Pain/Comfort:  Pain Rating 1: 0/10      Objective:     Communicated with PT/Nursing prior to session.  Patient found HOB elevated with blood pressure cuff, oxygen, pulse ox (continuous), peripheral IV, telemetry upon PT entry to room.     General Precautions: Standard, fall, respiratory  Orthopedic Precautions: spinal precautions  Braces: TLSO  Respiratory Status: Nasal cannula, flow 6 L/min     Functional Mobility:  Bed Mobility:  Scooting: total assistance      AM-PAC 6 CLICK MOBILITY  Turning over in bed (including adjusting bedclothes, sheets and blankets)?: 1  Sitting down on and standing up  from a chair with arms (e.g., wheelchair, bedside commode, etc.): 1  Moving from lying on back to sitting on the side of the bed?: 1  Moving to and from a bed to a chair (including a wheelchair)?: 1  Need to walk in hospital room?: 1  Climbing 3-5 steps with a railing?: 1  Basic Mobility Total Score: 6       Treatment & Education:  Pt seen while in supine and scooted up higher in bed with Total A for proper positioning. Pt agreed to gentle (B) LE TE AAROM and gentle stretching x 10 reps ea. Tx limited per pt's request and c/o SOB and left in supine with head ob bed elevated and call button reach.    Patient left HOB elevated with all lines intact, call button in reach, and bed alarm on..    GOALS:   Multidisciplinary Problems       Physical Therapy Goals          Problem: Physical Therapy    Goal Priority Disciplines Outcome Interventions   Physical Therapy Goal     PT, PT/OT Progressing    Description: Goals to be met by: DC     Patient will increase functional independence with mobility by performin. Supine to sit with Warren  2. Sit to supine with Warren  3. Sit to stand transfer with Supervision with RW  4. Gait  x 150 feet with Contact Guard Assistance using Rolling Walker.                          DME Justifications:  No DME recommended requiring DME justifications    Time Tracking:     PT Received On: 25  PT Start Time: 1130     PT Stop Time: 1145  PT Total Time (min): 15 min     Billable Minutes: Therapeutic Exercise 15    Treatment Type: Treatment  PT/PTA: PTA     Number of PTA visits since last PT visit: 2     2025

## 2025-07-02 NOTE — PROGRESS NOTES
[facility] Pulmonary Progress Note      Brief Hx:   79M with Lung adenomacarcinoma s/p Chemo/rads/immunotherapy c/b radiation pneumonitis and necrosis with brain metastasis, CAD, SHOLA on CPAP, HTN, TIA, COPD GOLD3, emphysema admitted to LTAC 6/23 following re-admission to the [facility] for increased O2 requirement, AF/RVR, and recurrent PNA/COPDe. His illness coarse started in late May with PNA and hypoxemic RF and he was Dc'ed to the LTAC but was hospitalized again from 6/9-11 for concern of MI and PNA/COPDe and again 6/18-22 for above. He was on broad spectrum Abx which were weaned to Levaquin on DC after he was able to be weaned from BIPAP to NC 3-5LPM.      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 Completed CRT (5/8/24-6/18/24) with weekly carboplatin/paclitaxel (5/9/24-6/18/24). Currently getting consolidation durvalumab (started on 7/18/24, received 2 cycles) but held from 9/26/24-12/6/24 due to concern for radiation pneumonitis.  XRT to left hilum/lung 60 Gy completed 6/18/2024; brain metastases x 3 separate occasions with gamma knife 8/2024 (left thalamus), 10/2024 (right temporal), and 1/2025 (right temporal).  Treatment complicated by pneumonitis secondary to immunotherapy (durvalumab) vs radiation pneumonitis.  Failed durvalumab re-challenge in December 2024.       Subjective:      No acute events reported.  Remains on high flow nasal cannula at 6-8 lpm due to dyspnea and desaturation with slow recovery.      We discussed his declining functional status since last fall and especially over the last couple months.  He reports that he is not satisfied with his present quality of life and expresses that he feels ready to die.  He has been discussing his wishes with his daughter and making  "peace for the future.  He is contemplating the possibility of hospice care.    Objective:   Last 24 Hour Vital Signs:  BP  Min: 103/59  Max: 123/67  Temp  Av.7 °F (36.5 °C)  Min: 97.3 °F (36.3 °C)  Max: 98.4 °F (36.9 °C)  Pulse  Av.6  Min: 79  Max: 94  Resp  Av.2  Min: 15  Max: 35  SpO2  Av.5 %  Min: 92 %  Max: 100 %  Weight  Av.1 kg (187 lb 9.8 oz)  Min: 85.1 kg (187 lb 9.8 oz)  Max: 85.1 kg (187 lb 9.8 oz)  I/O last 3 completed shifts:  In: 160 [P.O.:160]  Out: 400 [Urine:400]    Physical Examination:  Gen: well developed, NAD  HEENT: NCAT, no LAD  CV:  no murmur, radial pulses equal  Pulm: ND on 5LPM, lung sounds wheezes bilaterally  Abd: soft, NTND  Ext: no edema  Neuro: AAOx3, no FND  Skin: warm, dry, intact, no rash  Psych:  Appropriate mood and affect, normal judgement    Laboratory:  Trended Lab Data:  Recent Labs     25  0416 25  0428   WBC 11.06 10.06   HGB 9.4* 8.8*   HCT 29.4* 27.4*   * 125*    138   K 3.8 4.1    101   CO2 29 27   BUN 23 24*   CREATININE 0.8 0.7   GLU 92 75   BILITOT 0.7 0.8   AST 26 21   ALT 36 26   ALKPHOS 104 88   CALCIUM 8.8 8.6*   ALBUMIN 1.7* 1.7*   PROT 5.0* 5.1*   MG 2.0 2.1   PHOS 3.0 2.6*       Cardiac: No results for input(s): "TROPONINI", "CKTOTAL", "CKMB", "BNP" in the last 168 hours.    Urinalysis:   Lab Results   Component Value Date    COLORU Yellow 2025    SPECGRAV 1.010 2025    NITRITE Negative 2025    KETONESU Trace (A) 2025    UROBILINOGEN Negative 2025       Microbiology:  Microbiology Results (last 7 days)       ** No results found for the last 168 hours. **              I have personally reviewed the above labs and imaging.    Current Medications:     Infusions:       Scheduled:   amiodarone  200 mg Oral Daily    amitriptyline  25 mg Oral QHS    ammonium lactate   Topical (Top) Daily    budesonide  0.5 mg Nebulization Q12H    cetirizine  10 mg Oral Daily    fluticasone propionate  " 2 spray Each Nostril Daily    guaiFENesin  1,200 mg Oral BID    levalbuterol  1.2495 mg Nebulization Q6H    levETIRAcetam  500 mg Oral BID    polyethylene glycol  17 g Oral BID    roflumilast  500 mcg Oral Daily    sodium chloride 3%  4 mL Nebulization BID    tiotropium bromide  2 puff Inhalation Daily        PRN:    Current Facility-Administered Medications:     acetaminophen, 650 mg, Oral, Q8H PRN    benzonatate, 100 mg, Oral, TID PRN    bisacodyL, 10 mg, Rectal, Daily PRN    calcium carbonate, 500 mg, Oral, TID PRN    hydrOXYzine HCL, 25 mg, Oral, TID PRN    levalbuterol, 1.2495 mg, Nebulization, Q4H PRN    lorazepam, 1 mg, Intravenous, Q4H PRN    melatonin, 6 mg, Oral, Nightly PRN    methocarbamoL, 500 mg, Oral, TID PRN    morphine, 2 mg, Intravenous, Q4H PRN    naloxone, 0.02 mg, Intravenous, PRN    nitroGLYCERIN, 0.4 mg, Sublingual, Q5 Min PRN    ondansetron, 4 mg, Oral, Q6H PRN    oxyCODONE, 5 mg, Oral, Q4H PRN    sodium chloride 0.9%, 10 mL, Intravenous, Q12H PRN    Assessment:     Acute hypoxemic Respiratory failure  Pneumonia  COPD GOLD3  Lung Adenocarcinoma c/b radiation pneumonitis   Recent hospitalization with PNA and parainfluenza.  ME'ed with coarse of levofloxacin which finished 6/26. Finished course of prednisone for COPDe while inpatient.   -- Continue to wean Supplemental O2 for goal SpO2 88-92%  -- continue scheduled LAMA/ scheduled RHONDA q6h, budesonide nebs BID  -- asked RT to provide fan for dyspnea relief, unfortunately facility does not carry, Discussed with patient to ask family member to bring small portable one.   -- continue flutter therapy TID  -- continue working with PT, vertical positioning, aspiration ppx     SHOLA  -- continue nightly CPAP     Palliative Care  -- continue conversations with patient and family regarding goals of care  -- DNR status with no plans to escalate care in the event of deterioration    Mr. Allen has been hospitalized continuously since 5/22/2025.      There  was no family at the bedside on rounds.

## 2025-07-02 NOTE — CARE UPDATE
07/01/25 3200   Patient Assessment/Suction   Level of Consciousness (AVPU) alert   Respiratory Effort Unlabored   Expansion/Accessory Muscles/Retractions expansion symmetric;no retractions;no use of accessory muscles   All Lung Fields Breath Sounds diminished   PRE-TX-O2   Device (Oxygen Therapy) CPAP   $ Is the patient on High Flow Oxygen? Yes   $ Noninvasive Daily Charge Noninvasive Daily   Flow (L/min) (Oxygen Therapy) 6   Oxygen Concentration (%) 40   SpO2 97 %   Pulse Oximetry Type Continuous   Oximetry Probe Status Assessed;Intact   Pulse 82   Resp (!) 21   Positioning HOB elevated 30 degrees   Airway Safety   Is Ambu Bag and Mask with Patient? Yes, Adult Ambu Bag and Mask   O2  at bedside? No   Suction set is at the bedside? Yes   Communication board is with the patient? No   Equipment Change   $ RT Equipment large full face mask   Respiratory Interventions   Cough And Deep Breathing done independently per patient   Preset CPAP/BiPAP Settings   Mode Of Delivery CPAP   $ CPAP/BiPAP Daily Charge 1   CPAP/BIPAP charged w/in last 24 h YES   $ Is patient using? Yes   Size of Mask Medium/Large   Sized Appropriately? Other (see comments)   Equipment Type   (Home Cpap)   Airway Device Type medium full face mask   Humidifier filled   EPAP (cm H2O) 8   AVAPS Min P (cm H2O) 15   ITime (sec) 0.9   Rise Time (sec) 3   Patient CPAP/BiPAP Settings   CPAP/BIPAP ID home uitn   Respiratory Evaluation   $ Care Plan Tech Time 15 min

## 2025-07-02 NOTE — CARE UPDATE
07/01/25 1946   Patient Assessment/Suction   Level of Consciousness (AVPU) alert   Respiratory Effort Normal;Unlabored   Expansion/Accessory Muscles/Retractions expansion symmetric;no retractions;no use of accessory muscles   All Lung Fields Breath Sounds diminished   LLL Breath Sounds crackles, fine   Rhythm/Pattern, Respiratory depth regular;pattern regular;unlabored   Cough Frequency no cough   PRE-TX-O2   Device (Oxygen Therapy) high flow nasal cannula w/device   $ Is the patient on High Flow Oxygen? Yes   $ Noninvasive Daily Charge Noninvasive Daily   Flow (L/min) (Oxygen Therapy) 6   Oxygen Concentration (%) 40   SpO2 (!) 92 %   Pulse Oximetry Type Continuous   $ Pulse Oximetry - Multiple Charge Pulse Oximetry - Multiple   Oximetry Probe Status Assessed;Intact   Pulse 90   Resp 18   Positioning HOB elevated 30 degrees   Aerosol Therapy   $ Aerosol Therapy Charges Aerosol Treatment   Daily Review of Necessity (SVN) completed   Respiratory Treatment Status (SVN) given   Treatment Route (SVN) mask   Patient Position HOB elevated   Post Treatment Assessment (SVN) breath sounds unchanged   Signs of Intolerance (SVN) none   Breath Sounds Post-Respiratory Treatment   Throughout All Fields Post-Treatment All Fields   Throughout All Fields Post-Treatment no change   Post-treatment Heart Rate (beats/min) 89   Post-treatment Resp Rate (breaths/min) 23   Vibratory PEP Therapy   $ Vibratory PEP Charges Acapella Therapy   $ Vibratory PEP Tech Time Charges 15 min   Daily Review of Necessity (PEP Therapy) completed   Type (PEP Therapy) vibratory/oscillatory   Device (PEP Therapy) Acapella low (blue)   Route (PEP Therapy) mouthpiece   Breaths per Cycle (PEP Therapy) 10   Cycles (PEP Therapy) 1   Settings (PEP Therapy) PEP 5   Patient Position (PEP Therapy) HOB elevated   Post Treatment Assessment (PEP) breath sounds unchanged   Signs of Intolerance (PEP Therapy) none   General Safety Checklist   Safety Promotion/Fall  Prevention side rails raised   Airway Safety   Is Ambu Bag and Mask with Patient? Yes, Adult Ambu Bag and Mask   O2  at bedside? No   Suction set is at the bedside? Yes   Communication board is with the patient? No   Respiratory Interventions   Cough And Deep Breathing done independently per patient   Airway/Ventilation Management airway patency maintained   Airway/Ventilation Support comfort measures provided   Breathing Techniques/Airway Clearance deep/controlled cough encouraged   Preset CPAP/BiPAP Settings   Mode Of Delivery standby   CPAP/BIPAP charged w/in last 24 h NO   Respiratory Evaluation   $ Care Plan Tech Time 15 min

## 2025-07-02 NOTE — PROGRESS NOTES
[facility]   Progress Note  Room: 207/207   Patient Name: Jordin Allen Jr.  MRN: [id number]  Admit Date: 6/3/2025   Length of Stay:  LOS: 29 days   Attending Physician: Bernardino Guthrie MD  Nurse Practitioner: Ernestine Palomino NP    Date of Service: 07/02/2025    Principal Problem:    Acute on chronic respiratory failure with hypoxia    Brief HPI:     Jordin Allen Jr. is a 79 y.o. 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  He presented to [facility] ED on 01/22/2025 with complaints of shortness of breath and left foot redness.  Admitted to hospital medicine team for left foot cellulitis and acute hypoxic respiratory failure secondary to pneumonia/COPD exacerbation. CTA chest negative for PE. Emphysematous change with superimposed edema. Nodular focus within the anterior aspect of left upper lobe.  Started on nebs, prednisone, and empiric vanc/cefepime and doxycycline.  Respiratory panel positive for parainfluenza virus.  Course further complicated by sinus tach with PACs/18, for which he was started on diltiazem.  Blood cultures positive for staph, suspected to be contaminant.  Repeat blood cultures returned negative.  He will switch to Augmentin and doxy.  Ongoing episodes of SVT, thought to be due to underlying hypoxia.  He was able to be weaned down to high-flow nasal cannula 15 L.  Discharged to [facility] on 06/03/2025 for rehab, wound care, and O2 weaning.     6/9-6/11-sent out to [facility] for concerns of MI. Workup revealed findings concerning for pneumonia on CT. Started on empiric vanc and zosyn. Also started on 5 day course of prednisone for COPD exacerbation.    6/18 - 6/22-sent out to [facility] again for increasing O2 requirements and worsening AFib with RVR on diltiazem drip.  Use switch to amiodarone drip at [facility],  and transitioned to p.o. amiodarone successfully.  Infectious workup revealed concerns for aspiration pneumonia.  He was treated with vanc, cefepime, and levofloxacin.  Antibiotics de-escalated to oral levofloxacin.  He was weaned from BiPAP to nasal cannula at 3-5 L.      Interval History    Discussion held with patient's daughter yesterday, she is also with board with hospice.    Received p.r.n. dose of morphine yesterday for air hunger, which was effective.    Labs reviewed and listed below, no critical values  Discussion held again today at bedside with Mr. Allen and daughter on phone. We again discussed goals of care, and Mr. Allen stated he is tired, and just wants to be comfortable. He would like to transition to hospice. We discussed discontinuing non-comfort focused medications as well as labs and monitoring, which he is in agreement with. We also discussed adding morphine for air hunger and ativan for anxiety. He's requesting a dose of morphine now.  Adjusting orders to reflect comfort measures. Can d/c labs and cardiac monitoring. Morphine and Ativan ordered prn.      Subjective:     Review of Systems   Constitutional:  Positive for malaise/fatigue.   Respiratory:  Negative for cough, sputum production and shortness of breath.    Cardiovascular:  Negative for chest pain and leg swelling.   Gastrointestinal:  Negative for heartburn, nausea and vomiting.   Genitourinary:  Negative for dysuria and urgency.   Musculoskeletal:  Positive for joint pain (R shoulder). Negative for myalgias.   Neurological:  Positive for weakness. Negative for dizziness.   Psychiatric/Behavioral:  Negative for depression. The patient does not have insomnia.          Objective:     Vital Sign Range  Temp:  [97.2 °F (36.2 °C)-98 °F (36.7 °C)]   Pulse:  [79-94]   Resp:  [15-35]   BP: (112-123)/(56-68)   SpO2:  [92 %-100 %]     Body mass index is 27.71 kg/m².  Oxygen Concentration (%):  [40] 40        Intake/Output Summary (Last 24  hours) at 7/2/2025 1014  Last data filed at 7/2/2025 0900  Gross per 24 hour   Intake 60 ml   Output 250 ml   Net -190 ml       Physical Exam  Constitutional:       Appearance: He is ill-appearing.   HENT:      Head: Normocephalic and atraumatic.      Mouth/Throat:      Mouth: Mucous membranes are moist.      Pharynx: Oropharynx is clear.   Eyes:      Extraocular Movements: Extraocular movements intact.      Pupils: Pupils are equal, round, and reactive to light.   Cardiovascular:      Rate and Rhythm: Normal rate. Rhythm irregular.   Pulmonary:      Breath sounds: Wheezing and rhonchi present.   Abdominal:      General: Bowel sounds are normal.      Palpations: Abdomen is soft.   Musculoskeletal:      Right lower leg: Edema present.      Left lower leg: Edema present.   Skin:     General: Skin is warm and dry.   Neurological:      Mental Status: He is alert and oriented to person, place, and time. Mental status is at baseline.   Psychiatric:         Mood and Affect: Affect is flat.         Wounds       Wound 05/19/25 0233 Moisture associated dermatitis Left dorsal Foot (Active)   05/19/25 0233 Foot   Present on Original Admission: Y   Primary Wound Type: Moisture ass   Side: Left   Orientation: dorsal        Wound 05/19/25 0231 Moisture associated dermatitis Left lateral Foot (Active)   05/19/25 0231 Foot   Present on Original Admission: Y   Primary Wound Type: Moisture ass   Side: Left   Orientation: lateral        Wound 05/19/25 0231 Pressure Injury Right anterior Ankle (Active)   05/19/25 0231 Ankle   Present on Original Admission: Y   Primary Wound Type: Pressure inj   Side: Right   Orientation: anterior        Wound 05/19/25 0233 Moisture associated dermatitis Left anterior Foot (Active)   05/19/25 0233 Foot   Present on Original Admission:    Primary Wound Type: Moisture ass   Side: Left   Orientation: anterior        Wound 06/04/25 1030 Moisture associated dermatitis Right lateral Abdomen (Active)   06/04/25  "1030 Abdomen   Present on Original Admission: Y   Primary Wound Type: Moisture ass   Side: Right   Orientation: lateral        Wound 06/04/25 1030 Moisture associated dermatitis Left lateral Abdomen (Active)   06/04/25 1030 Abdomen   Present on Original Admission: Y   Primary Wound Type: Moisture ass   Side: Left   Orientation: lateral        Wound 06/04/25 1030 Moisture associated dermatitis Right Groin (Active)   06/04/25 1030 Groin   Present on Original Admission: Y   Primary Wound Type: Moisture ass   Side: Right        Wound 06/04/25 1030 Moisture associated dermatitis Left Groin (Active)   06/04/25 1030 Groin   Present on Original Admission: Y   Primary Wound Type: Moisture ass   Side: Left        Wound 06/04/25 1030 Moisture associated dermatitis Sacral spine (Active)   06/04/25 1030 Sacral spine   Present on Original Admission: Y   Primary Wound Type: Moisture ass         Wounds consistently followed by [facility] NP Sheree Tao     Labs:  Recent Labs   Lab 06/28/25  0359 06/30/25  0416 07/02/25  0428   WBC 9.18 11.06 10.06   HGB 9.0* 9.4* 8.8*   HCT 29.1* 29.4* 27.4*   PLT 87* 102* 125*     Recent Labs   Lab 06/27/25  0445 06/30/25  0416 07/02/25  0428    139 138   K 3.7 3.8 4.1    101 101   CO2 30* 29 27   BUN 20 23 24*   CREATININE 0.7 0.8 0.7    92 75   CALCIUM 8.4* 8.8 8.6*   MG 2.0 2.0 2.1   PHOS 2.3* 3.0 2.6*     Recent Labs   Lab 06/27/25  0445 06/30/25  0416 07/02/25  0428   ALKPHOS 89 104 88   ALT 36 36 26   AST 17 26 21   ALBUMIN 1.7* 1.7* 1.7*   PROT 5.0* 5.0* 5.1*   BILITOT 0.8 0.7 0.8       No results for input(s): "POCTGLUCOSE" in the last 72 hours.      Meds Scheduled:   amiodarone  200 mg Oral Daily    amitriptyline  25 mg Oral QHS    ammonium lactate   Topical (Top) Daily    apixaban  5 mg Oral BID    atorvastatin  80 mg Oral Daily    budesonide  0.5 mg Nebulization Q12H    cetirizine  10 mg Oral Daily    fluticasone propionate  2 spray Each Nostril Daily    " guaiFENesin  1,200 mg Oral BID    levalbuterol  1.2495 mg Nebulization Q6H    levETIRAcetam  500 mg Oral BID    megestroL  20 mg Oral Daily    pantoprazole  40 mg Oral Daily    polyethylene glycol  17 g Oral BID    roflumilast  500 mcg Oral Daily    sodium chloride 3%  4 mL Nebulization BID    tiotropium bromide  2 puff Inhalation Daily       Current Inpatient Problem List:  Active Hospital Problems    Diagnosis  POA    *Acute on chronic respiratory failure with hypoxia [J96.21]  Yes    Parainfluenza virus infection [B34.8]  Yes    Pneumonia of right lung due to infectious organism [J18.9]  Yes    Cellulitis [L03.90]  Yes    TIA (transient ischemic attack) [G45.9]  Yes     ASA and statin      Metastasis to brain [C79.31]  Yes    Adenocarcinoma, lung, 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       CAD (coronary artery disease) [I25.10]  Yes     Chronic     -C (10/31/13) for stemi: pLAD 100%, Cx with Li's, mRCA 40%, LVEF 55%,. LVEDP 15   -Xience 3.0 x 33 mm to pLAD, post dilated with 3.5 NC and 4.0 NC.   -TTE (8/14/13): LVEF 55%, grade I diastolic dysfunction      HTN (hypertension), benign [I10]  Yes    SHOLA (obstructive sleep apnea) [G47.33]  Yes     CPAP at night      GERD (gastroesophageal reflux disease) [K21.9]  Yes     Continue PPI        Resolved Hospital Problems   No resolved problems to display.         Assessment / Plan:     Acute respiratory failure with hypoxia  COPD with acute exacerbation  Parainfluenza virus infection  Pneumonia   On Symbicort Spiriva and Daliresp at home. Follows up with pulmonology outpatient.   Completed 7 day course of Augmentin and doxycycline on 06/01  Continue Daliresp 500mcg daily  Continue Xopenex p.r.n.   Continue weaning high-flow nasal cannula  Continue guaifenesin 1200 mg b.i.d.  Completed 7 day course of levofloxacin  on 6/25  Pulmonology consulted, appreciate recommendations  On 7L nasal cannula this morning with O2 sats 95%    Comfort measures only   Discussion held again today at bedside with Mr. Allen and daughter on phone. We again discussed goals of care, and Mr. Allen stated he is tired, and just wants to be comfortable. He would like to transition to hospice. We discussed discontinuing non-comfort focused medications as well as labs and monitoring, which he is in agreement with. We also discussed adding morphine for air hunger and ativan for anxiety. He's requesting a dose of morphine now.  Adjusting orders to reflect comfort measures. Can d/c labs and cardiac monitoring. Morphine and Ativan ordered prn.    Constipation, new   Last Bowel Movement: 06/28/25  Continue MiraLax b.i.d.   Continue suppository p.r.n.      Thrombocytopenia, new   Aspirin and mirtazapine discontinued on 06/27  Platelets improved to 125 today  Discontinue labs as patient is now on comfort measures only      Chest pain, new   Resolved   Evaluated by cardiology while in the hospital  Will need outpatient follow up with Dr. Ba with Cardiology     Cellulitis   Wound followed and managed by consistent, contracted [facility] NP  Completed 7 day course of doxy and Augmentin     Restless leg syndrome   Continue amitriptyline 25 mg nightly     Adenocarcinoma, lung, left   Metastasis to brain  Completed treatment with chemo/XRT. Brain XRT for lesionsx2. off immunotherapy since 12/24  suspect the LLL area was consolidation of prior RXT changes and not malignancy and is nearly resolved.  CT on presentation with new SANJAY nodule, need op follow up with pulm  Continue prophylactic Keppra 500 mg b.i.d.    Right shoulder pain  Having some shoulder pain to his right shoulder, which seems to be some muscle spasms.    Started Robaxin 500 mg q.i.d. p.r.n. on 6/6    Compression fracture of L1 vertebrae with routine healing   TLSO brace when out of bed    "  Dyslipidemia  TIA  CAD   Will discontinue aspirin and atorvastatin, transitioning to comfort measures     Tachycardia   Episodes of SVT while at the hospital   Evaluated by Cardiology   Likely worsened by underlying hypoxia   Supplemental O2   Uncontrolled AFib with RVR on 06/16, he was placed on diltiazem drip though rate remained difficult to control  Sent back to [facility] and ultimately started on amiodarone for rate control   Continue amiodarone 400 mg b.i.d. for 8 days, then transition to amiodarone 200 mg daily  Plan to continue amiodarone for comfort   Can discontinue cardiac monitoring       SHOLA   Continue CPAP nightly     GERD  Continue PPI daily     Left lateral foot wound   Right anterior ankle wound   Traumatic left dorsal foot wound   Left anterior foot venous ulcer   Skin tear right distal arm   Pressure injury nose  Wound followed and managed by consistent, contracted [facility] NP    ACP, 07/02/2025   This was a voluntary visit and the option to decline counseling was given  Length of discussion:  20 minutes  Life limiting problem:  Hypoxemia  Topics discussed:  Goals of care  Outcome of discussion:Mr. Allen stated he is tired, and just wants to be comfortable. He would like to transition to hospice. We discussed discontinuing non-comfort focused medications as well as labs and monitoring, which he is in agreement with. We also discussed adding morphine for air hunger and ativan for anxiety. He's requesting a dose of morphine now.  Adjusting orders to reflect comfort measures. Can d/c labs and cardiac monitoring. Morphine and Ativan ordered prn.  Decision Maker:Jordin Allen      ACP, 7/1/25  This was a voluntary visit and the option to decline counseling was given  Length of discussion: 16 minutes  Life limiting problem:  Respiratory failure  Topics discussed:  Hospice  Outcome of discussion: He states that he understands that he is likely at the end of life, and he "just wants to be comfortable". " "He would like to include his daughter on further discussions regarding hospice. Discussed with attending MD, may also be able to reach out to palliative care to see if patient could be transferred [place] [facility]. Giving dose of morphine 2mg for air hunger, and will reach out to palliative care today for further assistance.  Decision Maker: Jordin Allen          ACP 6/19  "Dr. Zaldivar and I held discussion with the patient regarding his goals of care. The patient states that he would want to be intubated for mechanical ventilation if he were to decompensate but in the event of a cardiac arrest, he would not want CPR and would want to be allowed to die peacefully." Per ALBA Argueta NP  Code status changed to DNR     Psychiatric Assessment  Patient Health Questionnaire-9 (PHQ-9)    Date:  06/05/2025  Total Score: 15  Screening is Positive   Diagnosis and Treatment Plan:  Moderate MDD   Continue amitriptyline 25 mg nightly   Increasing mirtazapine to 15 mg nightly, which will hopefully help with his appetite as well       Diet:  Cardiac   GI Ppx:  PPI   DVT Ppx:  Enoxaparin     Lines:  PIV   Drains:  None   Airways:n/a   Wounds:  Multiple    Goals of Care:   Restorative,  Treat infection  Optimize nutrition  Wound healing  Muscle strengthening  Restoration of ADL's  Improve mobility    Anticipated Disposition:    TBD    Follow up appointments:   Future Appointments   Date Time Provider Department Center   7/8/2025  9:15 AM MEEKS, VISUAL FIELDS [facility] OPHTHAL [facility]   7/8/2025 10:00 AM Cara Looney MD [facility] OPHTHAL [facility]   7/15/2025 10:00 AM Garrett Lloyd DPM NOM POD [facility] Ort   7/23/2025 10:15 AM CV [facility] ECHO [facility]IA [place]   7/30/2025  8:30 AM [facility] OIC-MRI1 [facility] MRI [facility]   7/30/2025 10:00 AM Bienvenido Henson MD [facility] RAD ONC [facility]   8/15/2025  9:20 AM Bienvenido Ward MD [facility] PULMON [place]   8/27/2025  9:00 AM Guido Trejo MD [facility] ENT [facility]   9/11/2025  8:30 AM " Yan Palmer MD [facility] Alvarez Palomino NP  Hospital Medicine [facility]    I have spent 56 minutes reviewing patient records (not including ACP time), examining, and  of the patient/ patient's family with greater than 50% of the time spent with direct patient care and coordination.

## 2025-07-03 PROCEDURE — 94761 N-INVAS EAR/PLS OXIMETRY MLT: CPT

## 2025-07-03 PROCEDURE — 63600175 PHARM REV CODE 636 W HCPCS: Performed by: STUDENT IN AN ORGANIZED HEALTH CARE EDUCATION/TRAINING PROGRAM

## 2025-07-03 PROCEDURE — 99900035 HC TECH TIME PER 15 MIN (STAT)

## 2025-07-03 PROCEDURE — 11000001 HC ACUTE MED/SURG PRIVATE ROOM

## 2025-07-03 PROCEDURE — 25000242 PHARM REV CODE 250 ALT 637 W/ HCPCS

## 2025-07-03 PROCEDURE — 25000242 PHARM REV CODE 250 ALT 637 W/ HCPCS: Performed by: STUDENT IN AN ORGANIZED HEALTH CARE EDUCATION/TRAINING PROGRAM

## 2025-07-03 PROCEDURE — 25000003 PHARM REV CODE 250: Performed by: STUDENT IN AN ORGANIZED HEALTH CARE EDUCATION/TRAINING PROGRAM

## 2025-07-03 PROCEDURE — 94799 UNLISTED PULMONARY SVC/PX: CPT

## 2025-07-03 PROCEDURE — 25000242 PHARM REV CODE 250 ALT 637 W/ HCPCS: Performed by: FAMILY MEDICINE

## 2025-07-03 PROCEDURE — 27100171 HC OXYGEN HIGH FLOW UP TO 24 HOURS

## 2025-07-03 PROCEDURE — 94640 AIRWAY INHALATION TREATMENT: CPT

## 2025-07-03 PROCEDURE — 25000003 PHARM REV CODE 250

## 2025-07-03 PROCEDURE — 25000003 PHARM REV CODE 250: Performed by: HOSPITALIST

## 2025-07-03 RX ADMIN — MORPHINE SULFATE 2 MG: 2 INJECTION, SOLUTION INTRAMUSCULAR; INTRAVENOUS at 06:07

## 2025-07-03 RX ADMIN — MORPHINE SULFATE 2 MG: 2 INJECTION, SOLUTION INTRAMUSCULAR; INTRAVENOUS at 09:07

## 2025-07-03 RX ADMIN — AMIODARONE HYDROCHLORIDE 200 MG: 200 TABLET ORAL at 09:07

## 2025-07-03 RX ADMIN — FLUTICASONE PROPIONATE 100 MCG: 50 SPRAY, METERED NASAL at 09:07

## 2025-07-03 RX ADMIN — LEVETIRACETAM 500 MG: 500 TABLET, FILM COATED ORAL at 09:07

## 2025-07-03 RX ADMIN — LEVALBUTEROL HYDROCHLORIDE 1.25 MG: 0.63 SOLUTION RESPIRATORY (INHALATION) at 06:07

## 2025-07-03 RX ADMIN — ROFLUMILAST 500 MCG: 500 TABLET ORAL at 09:07

## 2025-07-03 RX ADMIN — MORPHINE SULFATE 2 MG: 2 INJECTION, SOLUTION INTRAMUSCULAR; INTRAVENOUS at 12:07

## 2025-07-03 RX ADMIN — LEVALBUTEROL HYDROCHLORIDE 1.25 MG: 0.63 SOLUTION RESPIRATORY (INHALATION) at 08:07

## 2025-07-03 RX ADMIN — Medication: at 09:07

## 2025-07-03 RX ADMIN — Medication 4 ML: at 06:07

## 2025-07-03 RX ADMIN — AMITRIPTYLINE HYDROCHLORIDE 25 MG: 25 TABLET, FILM COATED ORAL at 09:07

## 2025-07-03 RX ADMIN — LEVALBUTEROL HYDROCHLORIDE 1.25 MG: 0.63 SOLUTION RESPIRATORY (INHALATION) at 12:07

## 2025-07-03 RX ADMIN — BUDESONIDE 0.5 MG: 0.5 SUSPENSION RESPIRATORY (INHALATION) at 08:07

## 2025-07-03 RX ADMIN — CETIRIZINE HYDROCHLORIDE 10 MG: 10 TABLET, FILM COATED ORAL at 09:07

## 2025-07-03 RX ADMIN — GUAIFENESIN 1200 MG: 600 TABLET, MULTILAYER, EXTENDED RELEASE ORAL at 09:07

## 2025-07-03 RX ADMIN — LEVALBUTEROL HYDROCHLORIDE 1.25 MG: 0.63 SOLUTION RESPIRATORY (INHALATION) at 02:07

## 2025-07-03 RX ADMIN — LORAZEPAM 1 MG: 2 INJECTION INTRAMUSCULAR; INTRAVENOUS at 06:07

## 2025-07-03 RX ADMIN — BUDESONIDE 0.5 MG: 0.5 SUSPENSION RESPIRATORY (INHALATION) at 06:07

## 2025-07-03 RX ADMIN — POLYETHYLENE GLYCOL 3350 17 G: 17 POWDER, FOR SOLUTION ORAL at 09:07

## 2025-07-03 NOTE — PROGRESS NOTES
[facility] Medicine   Progress Note  Room: 207/207   Patient Name: Jordin Allen Jr.  MRN: [id number]  Admit Date: 6/3/2025   Length of Stay:  LOS: 30 days   Attending Physician: Bernardino Guthrie MD  Nurse Practitioner: Ernestine Palomino NP    Date of Service: 07/03/2025    Principal Problem:    Acute on chronic respiratory failure with hypoxia    Brief HPI:     Jordin Allen Jr. is a 79 y.o. 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  He presented to [facility] ED on 01/22/2025 with complaints of shortness of breath and left foot redness.  Admitted to hospital medicine team for left foot cellulitis and acute hypoxic respiratory failure secondary to pneumonia/COPD exacerbation. CTA chest negative for PE. Emphysematous change with superimposed edema. Nodular focus within the anterior aspect of left upper lobe.  Started on nebs, prednisone, and empiric vanc/cefepime and doxycycline.  Respiratory panel positive for parainfluenza virus.  Course further complicated by sinus tach with PACs/18, for which he was started on diltiazem.  Blood cultures positive for staph, suspected to be contaminant.  Repeat blood cultures returned negative.  He will switch to Augmentin and doxy.  Ongoing episodes of SVT, thought to be due to underlying hypoxia.  He was able to be weaned down to high-flow nasal cannula 15 L.  Discharged to [facility] on 06/03/2025 for rehab, wound care, and O2 weaning.     6/9-6/11-sent out to [facility] for concerns of MI. Workup revealed findings concerning for pneumonia on CT. Started on empiric vanc and zosyn. Also started on 5 day course of prednisone for COPD exacerbation.    6/18 - 6/22-sent out to [facility] again for increasing O2 requirements and worsening AFib with RVR on diltiazem drip.  Use switch to amiodarone drip at [facility],  and transitioned to p.o. amiodarone successfully.  Infectious workup revealed concerns for aspiration pneumonia.  He was treated with vanc, cefepime, and levofloxacin.  Antibiotics de-escalated to oral levofloxacin.  He was weaned from BiPAP to nasal cannula at 3-5 L.      Interval History    Received p.r.n. morphine and Ativan overnight for air hunger and anxiety   Ativan and morphine seem to be working well   Currently on airvo  Will send referrals to [facility] for respite care, tentative d/c 7/7      Subjective:     Review of Systems   Constitutional:  Positive for malaise/fatigue.   Respiratory:  Negative for cough, sputum production and shortness of breath.    Cardiovascular:  Negative for chest pain and leg swelling.   Gastrointestinal:  Negative for heartburn, nausea and vomiting.   Genitourinary:  Negative for dysuria and urgency.   Musculoskeletal:  Positive for joint pain (R shoulder). Negative for myalgias.   Neurological:  Positive for weakness. Negative for dizziness.   Psychiatric/Behavioral:  Negative for depression. The patient does not have insomnia.          Objective:     Vital Sign Range  Temp:  [97 °F (36.1 °C)-98.4 °F (36.9 °C)]   Pulse:  [84-97]   Resp:  [18-24]   BP: (103-122)/(59-75)   SpO2:  [92 %-100 %]     Body mass index is 28.13 kg/m².  Oxygen Concentration (%):  [40-45] 45        Intake/Output Summary (Last 24 hours) at 7/3/2025 0914  Last data filed at 7/3/2025 0141  Gross per 24 hour   Intake 100 ml   Output 500 ml   Net -400 ml       Physical Exam  Constitutional:       Appearance: He is ill-appearing.   HENT:      Head: Normocephalic and atraumatic.      Mouth/Throat:      Mouth: Mucous membranes are moist.      Pharynx: Oropharynx is clear.   Eyes:      Extraocular Movements: Extraocular movements intact.      Pupils: Pupils are equal, round, and reactive to light.   Cardiovascular:      Rate and Rhythm: Normal rate. Rhythm irregular.   Pulmonary:      Breath sounds:  Wheezing and rhonchi present.   Abdominal:      General: Bowel sounds are normal.      Palpations: Abdomen is soft.   Musculoskeletal:      Right lower leg: Edema present.      Left lower leg: Edema present.   Skin:     General: Skin is warm and dry.   Neurological:      Mental Status: He is alert and oriented to person, place, and time. Mental status is at baseline.   Psychiatric:         Mood and Affect: Affect is flat.         Wounds       Wound 05/19/25 0233 Moisture associated dermatitis Left dorsal Foot (Active)   05/19/25 0233 Foot   Present on Original Admission: Y   Primary Wound Type: Moisture ass   Side: Left   Orientation: dorsal        Wound 05/19/25 0231 Moisture associated dermatitis Left lateral Foot (Active)   05/19/25 0231 Foot   Present on Original Admission: Y   Primary Wound Type: Moisture ass   Side: Left   Orientation: lateral        Wound 05/19/25 0231 Pressure Injury Right anterior Ankle (Active)   05/19/25 0231 Ankle   Present on Original Admission: Y   Primary Wound Type: Pressure inj   Side: Right   Orientation: anterior        Wound 05/19/25 0233 Moisture associated dermatitis Left anterior Foot (Active)   05/19/25 0233 Foot   Present on Original Admission:    Primary Wound Type: Moisture ass   Side: Left   Orientation: anterior        Wound 06/04/25 1030 Moisture associated dermatitis Right lateral Abdomen (Active)   06/04/25 1030 Abdomen   Present on Original Admission: Y   Primary Wound Type: Moisture ass   Side: Right   Orientation: lateral        Wound 06/04/25 1030 Moisture associated dermatitis Left lateral Abdomen (Active)   06/04/25 1030 Abdomen   Present on Original Admission: Y   Primary Wound Type: Moisture ass   Side: Left   Orientation: lateral        Wound 06/04/25 1030 Moisture associated dermatitis Right Groin (Active)   06/04/25 1030 Groin   Present on Original Admission: Y   Primary Wound Type: Moisture ass   Side: Right        Wound 06/04/25 1030 Moisture associated  "dermatitis Left Groin (Active)   06/04/25 1030 Groin   Present on Original Admission: Y   Primary Wound Type: Moisture ass   Side: Left        Wound 06/04/25 1030 Moisture associated dermatitis Sacral spine (Active)   06/04/25 1030 Sacral spine   Present on Original Admission: Y   Primary Wound Type: Moisture ass         Wounds consistently followed by [facility] NP Sheree Tao     Labs:  Recent Labs   Lab 06/28/25  0359 06/30/25  0416 07/02/25  0428   WBC 9.18 11.06 10.06   HGB 9.0* 9.4* 8.8*   HCT 29.1* 29.4* 27.4*   PLT 87* 102* 125*     Recent Labs   Lab 06/27/25  0445 06/30/25  0416 07/02/25  0428    139 138   K 3.7 3.8 4.1    101 101   CO2 30* 29 27   BUN 20 23 24*   CREATININE 0.7 0.8 0.7    92 75   CALCIUM 8.4* 8.8 8.6*   MG 2.0 2.0 2.1   PHOS 2.3* 3.0 2.6*     Recent Labs   Lab 06/27/25  0445 06/30/25  0416 07/02/25  0428   ALKPHOS 89 104 88   ALT 36 36 26   AST 17 26 21   ALBUMIN 1.7* 1.7* 1.7*   PROT 5.0* 5.0* 5.1*   BILITOT 0.8 0.7 0.8       No results for input(s): "POCTGLUCOSE" in the last 72 hours.      Meds Scheduled:   amiodarone  200 mg Oral Daily    amitriptyline  25 mg Oral QHS    ammonium lactate   Topical (Top) Daily    budesonide  0.5 mg Nebulization Q12H    cetirizine  10 mg Oral Daily    fluticasone propionate  2 spray Each Nostril Daily    guaiFENesin  1,200 mg Oral BID    levalbuterol  1.2495 mg Nebulization Q6H    levETIRAcetam  500 mg Oral BID    polyethylene glycol  17 g Oral BID    roflumilast  500 mcg Oral Daily    sodium chloride 3%  4 mL Nebulization BID       Current Inpatient Problem List:  Active Hospital Problems    Diagnosis  POA    *Acute on chronic respiratory failure with hypoxia [J96.21]  Yes    Comfort measures only status [Z51.5]  Not Applicable    Parainfluenza virus infection [B34.8]  Yes    Pneumonia of right lung due to infectious organism [J18.9]  Yes    Cellulitis [L03.90]  Yes    TIA (transient ischemic attack) [G45.9]  Yes     ASA and " statin      Metastasis to brain [C79.31]  Yes    Adenocarcinoma, lung, 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       CAD (coronary artery disease) [I25.10]  Yes     Chronic     -[facility] (10/31/13) for stemi: pLAD 100%, Cx with Li's, mRCA 40%, LVEF 55%,. LVEDP 15   -Xience 3.0 x 33 mm to pLAD, post dilated with 3.5 NC and 4.0 NC.   -TTE (8/14/13): LVEF 55%, grade I diastolic dysfunction      HTN (hypertension), benign [I10]  Yes    SHOLA (obstructive sleep apnea) [G47.33]  Yes     CPAP at night      GERD (gastroesophageal reflux disease) [K21.9]  Yes     Continue PPI        Resolved Hospital Problems   No resolved problems to display.         Assessment / Plan:     Acute respiratory failure with hypoxia  COPD with acute exacerbation  Parainfluenza virus infection  Pneumonia   On Symbicort Spiriva and Daliresp at home. Follows up with pulmonology outpatient.   Completed 7 day course of Augmentin and doxycycline on 06/01  Continue Daliresp 500mcg daily  Continue Xopenex p.r.n.   Continue weaning high-flow nasal cannula  Continue guaifenesin 1200 mg b.i.d.  Completed 7 day course of levofloxacin on 6/25  Pulmonology consulted, appreciate recommendations  On 8L nasal cannula this morning with O2 sats 93%    Comfort measures only   7/2Discussion held again today at bedside with Mr. Allen and daughter on phone. We again discussed goals of care, and Mr. Allen stated he is tired, and just wants to be comfortable. He would like to transition to hospice. We discussed discontinuing non-comfort focused medications as well as labs and monitoring, which he is in agreement with. We also discussed adding morphine for air hunger and ativan for anxiety. He's requesting a dose of morphine now.  Adjusting orders to reflect comfort measures. Can d/c labs and cardiac monitoring. Morphine  and Ativan ordered prn.  7/3-Received p.r.n. morphine and Ativan overnight for air hunger and anxiety   Ativan and morphine seem to be working well     Constipation, new   Last Bowel Movement: 07/03/25  Continue MiraLax b.i.d.   Continue suppository p.r.n.      Thrombocytopenia, new   Aspirin and mirtazapine discontinued on 06/27  Platelets improved to 125   Discontinued labs as patient is now on comfort measures only      Chest pain, new   Resolved   Evaluated by cardiology while in the hospital  Will need outpatient follow up with Dr. Ba with Cardiology     Cellulitis   Wound followed and managed by consistent, contracted [facility] NP  Completed 7 day course of doxy and Augmentin     Restless leg syndrome   Continue amitriptyline 25 mg nightly     Adenocarcinoma, lung, left   Metastasis to brain  Completed treatment with chemo/XRT. Brain XRT for lesionsx2. off immunotherapy since 12/24  suspect the LLL area was consolidation of prior RXT changes and not malignancy and is nearly resolved.  CT on presentation with new SANJAY nodule, need op follow up with pulm  Continue prophylactic Keppra 500 mg b.i.d.    Right shoulder pain  Having some shoulder pain to his right shoulder, which seems to be some muscle spasms.    Started Robaxin 500 mg q.i.d. p.r.n. on 6/6    Compression fracture of L1 vertebrae with routine healing   TLSO brace when out of bed     Dyslipidemia  TIA  CAD   Will discontinue aspirin and atorvastatin, transitioning to comfort measures     Tachycardia   Episodes of SVT while at the hospital   Evaluated by Cardiology   Likely worsened by underlying hypoxia   Supplemental O2   Uncontrolled AFib with RVR on 06/16, he was placed on diltiazem drip though rate remained difficult to control  Sent back to [facility] and ultimately started on amiodarone for rate control   Continue amiodarone 400 mg b.i.d. for 8 days, then transition to amiodarone 200 mg daily  Plan to continue amiodarone for comfort   Can  "discontinue cardiac monitoring       SHOLA   Continue CPAP nightly     GERD  Continue PPI daily     Left lateral foot wound   Right anterior ankle wound   Traumatic left dorsal foot wound   Left anterior foot venous ulcer   Skin tear right distal arm   Pressure injury nose  Wound followed and managed by consistent, contracted [facility] NP    ACP, 07/02/2025   This was a voluntary visit and the option to decline counseling was given  Length of discussion:  20 minutes  Life limiting problem:  Hypoxemia  Topics discussed:  Goals of care  Outcome of discussion:Mr. Allen stated he is tired, and just wants to be comfortable. He would like to transition to hospice. We discussed discontinuing non-comfort focused medications as well as labs and monitoring, which he is in agreement with. We also discussed adding morphine for air hunger and ativan for anxiety. He's requesting a dose of morphine now.  Adjusting orders to reflect comfort measures. Can d/c labs and cardiac monitoring. Morphine and Ativan ordered prn.  Decision Maker:Jordin Allen      ACP, 7/1/25  This was a voluntary visit and the option to decline counseling was given  Length of discussion: 16 minutes  Life limiting problem:  Respiratory failure  Topics discussed:  Hospice  Outcome of discussion: He states that he understands that he is likely at the end of life, and he "just wants to be comfortable". He would like to include his daughter on further discussions regarding hospice. Discussed with attending MD, may also be able to reach out to palliative care to see if patient could be transferred to H[facility] at [facility]. Giving dose of morphine 2mg for air hunger, and will reach out to palliative care today for further assistance.  Decision Maker: Jordin Allen          ACP 6/19  "Dr. Zaldivar and I held discussion with the patient regarding his goals of care. The patient states that he would want to be intubated for mechanical ventilation if he were to decompensate but " "in the event of a cardiac arrest, he would not want CPR and would want to be allowed to die peacefully." Per ALBA Argueta NP  Code status changed to DNR     Psychiatric Assessment  Patient Health Questionnaire-9 (PHQ-9)    Date:  06/05/2025  Total Score: 15  Screening is Positive   Diagnosis and Treatment Plan:  Moderate MDD   Continue amitriptyline 25 mg nightly   Increasing mirtazapine to 15 mg nightly, which will hopefully help with his appetite as well       Diet:  Cardiac   GI Ppx:  PPI   DVT Ppx:  Enoxaparin     Lines:  PIV   Drains:  None   Airways:n/a   Wounds:  Multiple    Goals of Care:   Restorative,  Treat infection  Optimize nutrition  Wound healing  Muscle strengthening  Restoration of ADL's  Improve mobility    Anticipated Disposition:    TBD    Follow up appointments:   Future Appointments   Date Time Provider Department Center   7/8/2025  9:15 AM CONSTANTINO, VISUAL FIELDS NOM OPHTHAL [facility]   7/8/2025 10:00 AM Cara Looney MD [facility] OPHTHAL [facility]   7/15/2025 10:00 AM Garrett Lloyd DPM NOM POD [facility] Ort   7/23/2025 10:15 AM CV [facility] ECHO [facility] CARDIA [place]   7/30/2025  8:30 AM NOM OIC-MRI1 [facility]   7/30/2025 10:00 AM Bienvenido Henson MD [facility] RAD ONC [facility]   8/15/2025  9:20 AM Bienvenido Ward MD [facility] PULMON [place]   8/27/2025  9:00 AM Guido Trejo MD [facility] ENT [facility]   9/11/2025  8:30 AM Yan Palmer MD [facility]  Hospital Medicine [facility]    I have spent 55 minutes reviewing patient records (not including ACP time), examining, and  of the patient/ patient's family with greater than 50% of the time spent with direct patient care and coordination.           "

## 2025-07-03 NOTE — PT/OT/SLP DISCHARGE
Physical Therapy Discharge Summary    Name: Jordin Allen Jr.  MRN: [id number]   Principal Problem: Acute on chronic respiratory failure with hypoxia     Patient Discharged from acute Physical Therapy on 7/3/25.  Please refer to prior PT noted date on 25 for functional status.     Assessment:     Patient is no longer making progress.    Objective:     GOALS:   Multidisciplinary Problems       Physical Therapy Goals          Problem: Physical Therapy    Goal Priority Disciplines Outcome Interventions   Physical Therapy Goal     PT, PT/OT Progressing    Description: Goals to be met by: DC     Patient will increase functional independence with mobility by performin. Supine to sit with [place]  2. Sit to supine with [place]  3. Sit to stand transfer with Supervision with RW  4. Gait  x 150 feet with Contact Guard Assistance using Rolling Walker.                          Reasons for Discontinuation of Therapy Services  Transfer to alternate level of care.      Plan:     Patient Discharged to: Palliative Care/Hospice.      7/3/2025

## 2025-07-03 NOTE — PLAN OF CARE
..[facility]  Interdisciplinary Team Conference        Jordin Allen Jr.    Conference Date: 7/3/2025  Admit Date: 6/3/2025       Active Hospital Problems    Diagnosis    *Acute on chronic respiratory failure with hypoxia    Comfort measures only status    Parainfluenza virus infection    Pneumonia of right lung due to infectious organism    Cellulitis    TIA (transient ischemic attack)     ASA and statin      Metastasis to brain    Adenocarcinoma, lung, left    Dyslipidemia    COPD with acute exacerbation     Taking symbicort and spiriva and daliresp  Peak flow 270 l/min In April his Fev-1: 1.33 liters 47%.       CAD (coronary artery disease)     -[facility] (10/31/13) for stemi: pLAD 100%, Cx with Li's, mRCA 40%, LVEF 55%,. LVEDP 15   -Xience 3.0 x 33 mm to pLAD, post dilated with 3.5 NC and 4.0 NC.   -TTE (8/14/13): LVEF 55%, grade I diastolic dysfunction      HTN (hypertension), benign    SHOLA (obstructive sleep apnea)     CPAP at night      GERD (gastroesophageal reflux disease)     Continue PPI          Code Status: DNR  Advance Directive  (If Adv Dir status is received, view document under Adv Dir in header or Chart Review Media tab): Patient does not have Advance Directive, declines information.        Power of /Surrogate Decision Maker: N/A    LAB/TEST/TREATMENTS:    POCT Glucose   Date Value Ref Range Status   06/08/2025 145 (H) 70 - 110 mg/dL Final   05/16/2025 98 70 - 110 mg/dL Final   05/16/2025 83 70 - 110 mg/dL Final      Lab Results   Component Value Date    INR 1.0 02/07/2025    INR 1.0 02/07/2025    INR 1.0 02/07/2025     Lab Results   Component Value Date    BUN 24 (H) 07/02/2025    BUN 23 06/30/2025    BUN 20 06/27/2025     Lab Results   Component Value Date    PH 7.471 (H) 06/18/2025    PCO2 38.7 06/18/2025    PO2 32.9 (L) 06/18/2025    HCO3 27.7 06/18/2025     Lab Results   Component Value Date    WBC 10.06 07/02/2025    WBC 11.06 06/30/2025    WBC 9.18 06/28/2025      Susceptibility data from last 90 days.  Collected Specimen Info Organism Amp/Sulbactam Ampicillin Cefazolin CEFEPIME ETEST Ceftriaxone Ciprofloxacin Ertapenem Gentamicin LEVOFLOXACIN ETEST Meropenem Pip/Tazo Tobramycin Trimeth/Sulfa   05/27/25 Respiratory from Sputum Candida albicans                05/25/25 Respiratory from Sputum, Expectorated Candida albicans                05/24/25 Respiratory from Sputum, Expectorated Candida albicans                05/22/25 Blood from Peripheral, Hand, Right Coagulase-negative Staphylococcus species                05/05/25 Abscess from Groin Escherichia coli  R  R  R  S  S  S  S  S  S  S  S  S  R   05/05/25 Abscess from Groin Bacteroides ovatus                         Dialysis: N/A    Provider Comments/Recommendations: comfort care, wants to go on hospice, [facility] hospice not a option, [facility] hosp[facility] family isn't in favor, unsure if he's appropriate for in pt hospice at this time, started morphine, dc tele, will wean off O2, will review meds to dc    DIAGNOSTIC TEST    Radiology (Last 168 hours)               06/27 1222 X-Ray Abdomen AP 1 View                X-Ray Abdomen AP 1 View  EXAMINATION:  XR ABDOMEN AP 1 VIEW    CLINICAL HISTORY:  constipation, abd pain;    FINDINGS:  Abdomen one view:    There is cardiomegaly.  There are small pleural effusions.  There is bibasal edema.  No obstruction, ileus, or perforation seen.  There is no significant constipation.  No acute process seen.    Electronically signed by: Alexander Blankenship MD  Date:    06/27/2025  Time:    12:25       NURSING:    Cognitive/Neuro/Behavioral WDL: WDL  Level of Consciousness (AVPU): alert  Arousal Level: opens eyes spontaneously  Orientation: oriented x 4  Speech: clear/fluent, follows commands    Fall Risk Score: 22  History Of Fall (W/I 3 Mos): Y  Fall this admission: no    Restraints:         Infections:  No active infections  No active isolations      Telemetry: no     Patient currently receiving  pharmacological/non pharmacological interventions for pain: Yes     Urinary Incontinence: No  Bowel Incontinence: Yes         Midline Catheter - Single Lumen 06/14/25 1245 Left basilic vein (medial side of arm) other (see comments) (Active)   $ Midline Charges (Upon insertion) Midline Catheter (Supply) 06/29/25 0715   Site Assessment Clean;Dry;Intact 07/02/25 2243   IV Device Securement catheter securement device 07/02/25 2243   Line Status Capped;Saline locked 07/02/25 2243   Dressing Type CHG impregnated dressing/sponge 07/02/25 2243   Dressing Status Clean;Dry;Intact 07/02/25 2243   Dressing Intervention Integrity maintained 07/02/25 2243   Dressing Change Due 07/05/25 07/02/25 2243   Site Change Due 07/12/25 07/02/25 2243   Reason Not Rotated Not due 07/01/25 1701   Number of days: 18       Comments/Recommendations:  bm today, midline, comfort measures only    Wound Care:             Wound 06/04/25 1030 Moisture associated dermatitis Sacral spine (Active)   06/04/25 1030 Sacral spine   Present on Original Admission: Y   Primary Wound Type: Moisture ass   Side:    Orientation:    Wound Approximate Age at First Assessment (Weeks):    Wound Number:    Is this injury device related?:    Incision Type:    Closure Method:    Wound Description (Comments):    Type:    Additional Comments:    Ankle-Brachial Index:    Pulses:    Removal Indication and Assessment:    Wound Outcome:    Wound Image    06/30/25 1015   Dressing Appearance Open to air 07/02/25 2243   Drainage Amount None 07/02/25 1300   Drainage Characteristics/Odor Serosanguineous 06/30/25 1015   Appearance Moist 07/02/25 1300   Tissue loss description Partial thickness 06/22/25 0800   Red (%), Wound Tissue Color 100 % 06/30/25 1015   Periwound Area Moist;[place] 07/02/25 1300   Wound Edges Approximated 06/25/25 1901   Care Cleansed with:;Wound cleanser 07/02/25 1300   Dressing Applied;Other (comment) 06/29/25 1500   Periwound Care Moisture barrier applied  06/25/25 1901   Number of days: 29        Anjum Score: 15     Skin Interventions: Device Skin Pressure Protection: absorbent pad utilized/changed, adhesive use limited, positioning supports utilized, pressure points protected, skin-to-device areas padded, skin-to-skin areas padded, tubing/devices free from skin contact  Pressure Reduction Devices: pressure-redistributing mattress utilized  Pressure Reduction Techniques: weight shift assistance provided     Comments/Recommendations:  masd sacrum and buttocks improving    Respiratory:       Flow (L/min) (Oxygen Therapy): 8  Oxygen Concentration (%): 45          Vent Weaning: N/A    Comments/Recommendations:  45% oxygen will begin weaning, sats 100%, will begin turning down FIO2    Nutrition:    Dietary Orders (From admission, onward)       Start     Ordered    06/24/25 1715  Dietary nutrition supplements Boost Plus Nutritional Drink - Any flavor  All Meals      Comments: CHOCOLATE   Question Answer Comment   Route: By Mouth    Select PO Supplement: Boost Plus Nutritional Drink - Any flavor        06/24/25 1339    06/03/25 1626  Diet Heart Healthy  Diet effective now         06/03/25 1625                     Parenteral Nutrition: N/A    Wt Readings from Last 1 Encounters:   07/03/25 0645 86.4 kg (190 lb 7.6 oz)   07/02/25 0614 85.1 kg (187 lb 9.8 oz)   07/01/25 0600 84.9 kg (187 lb 2.7 oz)   06/30/25 0600 84.2 kg (185 lb 10 oz)   06/29/25 0427 83.8 kg (184 lb 11.9 oz)   06/28/25 0300 88.9 kg (195 lb 15.8 oz)   06/27/25 0545 89.6 kg (197 lb 8.5 oz)   06/26/25 0600 86.6 kg (190 lb 14.7 oz)   06/25/25 0600 88.4 kg (194 lb 14.2 oz)   06/24/25 0600 88 kg (194 lb 0.1 oz)   06/23/25 0546 88.2 kg (194 lb 7.1 oz)   06/22/25 1510 88.1 kg (194 lb 3.6 oz)   06/18/25 0400 87.4 kg (192 lb 10.9 oz)   06/17/25 0600 92.2 kg (203 lb 4.2 oz)   06/16/25 0401 92.6 kg (204 lb 2.3 oz)   06/15/25 0400 89.6 kg (197 lb 8.5 oz)   06/14/25 0800 87.9 kg (193 lb 12.6 oz)   06/14/25 0424 87.9 kg  (193 lb 12.6 oz)   06/13/25 0800 88 kg (194 lb 0.1 oz)   06/13/25 0600 88 kg (194 lb 0.1 oz)   06/12/25 0558 87.4 kg (192 lb 10.9 oz)   06/09/25 0800 86.3 kg (190 lb 4.1 oz)   06/09/25 0615 87.9 kg (193 lb 12.6 oz)   06/08/25 0444 90.4 kg (199 lb 4.7 oz)   06/05/25 1032 96 kg (211 lb 10.3 oz)   06/05/25 0600 96 kg (211 lb 10.3 oz)   06/04/25 0800 96 kg (211 lb 10.3 oz)   06/03/25 1629 96 kg (211 lb 10.3 oz)   06/03/25 1555 86.8 kg (191 lb 5.8 oz)       Comments/Recommendations: heart healthy diet will change to reg intake 0-25%, wt 190 lbs     Pharmacy:        amiodarone  200 mg Oral Daily    amitriptyline  25 mg Oral QHS    ammonium lactate   Topical (Top) Daily    budesonide  0.5 mg Nebulization Q12H    cetirizine  10 mg Oral Daily    fluticasone propionate  2 spray Each Nostril Daily    guaiFENesin  1,200 mg Oral BID    levalbuterol  1.2495 mg Nebulization Q6H    levETIRAcetam  500 mg Oral BID    polyethylene glycol  17 g Oral BID    roflumilast  500 mcg Oral Daily    sodium chloride 3%  4 mL Nebulization BID      Antibiotics (From admission, onward)      Start     Stop Route Frequency Ordered    06/12/25 0945  piperacillin-tazobactam 4.5 g in sodium chloride 0.9% 100 mL IVPB (ready to mix)         06/19/25 2359 IV Every 8 hours 06/12/25 0930    06/04/25 2100  mupirocin 2 % ointment         06/09/25 2059 Nasl 2 times daily 06/04/25 1722           Antivirals (From admission, onward)      None             Current Facility-Administered Medications:     acetaminophen, 650 mg, Oral, Q8H PRN    benzonatate, 100 mg, Oral, TID PRN    bisacodyL, 10 mg, Rectal, Daily PRN    calcium carbonate, 500 mg, Oral, TID PRN    hydrOXYzine HCL, 25 mg, Oral, TID PRN    levalbuterol, 1.2495 mg, Nebulization, Q4H PRN    lorazepam, 1 mg, Intravenous, Q4H PRN    melatonin, 6 mg, Oral, Nightly PRN    methocarbamoL, 500 mg, Oral, TID PRN    morphine, 2 mg, Intravenous, Q4H PRN    naloxone, 0.02 mg, Intravenous, PRN    nitroGLYCERIN, 0.4 mg,  Sublingual, Q5 Min PRN    ondansetron, 4 mg, Oral, Q6H PRN    oxyCODONE, 5 mg, Oral, Q4H PRN    sodium chloride 0.9%, 10 mL, Intravenous, Q12H PRN     Comments/Recommendations: no abx     Physical Therapy:    Multidisciplinary Problems       Physical Therapy Goals          Problem: Physical Therapy    Goal Priority Disciplines Outcome Interventions   Physical Therapy Goal     PT, PT/OT Progressing    Description: Goals to be met by: DC     Patient will increase functional independence with mobility by performin. Supine to sit with [place]  2. Sit to supine with [place]  3. Sit to stand transfer with Supervision with RW  4. Gait  x 150 feet with Contact Guard Assistance using Rolling Walker.                            Comments/Recommendations:  NA    Occupational Therapy:    Multidisciplinary Problems       Occupational Therapy Goals          Problem: Occupational Therapy    Goal Priority Disciplines Outcome Interventions   Occupational Therapy Goal     OT, PT/OT Progressing    Description: Goals to be met by: 25  Patient will increase functional independence with ADLs by performing:    UE Dressing with Set-up Assistance. Discontinued  LE Dressing with SBA using appropriate AE as needed. Discontinued  Grooming while seated at sink with Set-up Assistance. Discontinued  Toileting from 3-in-1 commode over toilet with min A for hygiene and clothing management. Discontinued  Supine to sit with SPV . Discontinued  Step transfer with min A with RW. Discontinued  Toilet transfer to 3-in-1 commode over toilet with min A with RW. Discontinued    Revised goals as of 25  Pt will tolerate bed level ADLs, such as grooming and oral hygiene with minimal cueing for pursed lip breathing.   Pt will tolerate bed level active and passive range of motion program to BUE to maintain joint integrity, prevent stiffness, and decrease pain.  Pt and family will be educated in optimal positioning and demo teach back  understanding to prevent deformity and maintain joint integrity   Pt will be educated in coping and stress management strategies and demo use of those strategies during functional tasks.                          Comments/Recommendations:  N/A    Speech Therapy:    Multidisciplinary Problems       SLP Goals       Not on file                     Comments/Recommendations:  N/A    Discharge Planning:    Prior to hospitilization cognitive status:: Alert/Oriented      People in Home: child(selene), adult, other relative(s)           Discharge Plan A: Rehab, Skilled Nursing Facility   Discharge Plan B: Home with family, Home Health, New Nursing Home placement - senior living care facility   DME Needed Upon Discharge : other (see comments) (TBD)        Future Appointments   Date Time Provider Department Center   7/8/2025  9:15 AM CONSTANTINO, VISUAL FIELDS NOM OPHTHAL [address]   7/8/2025 10:00 AM Cara Looney MD NOM OPHTHAL [address]   7/15/2025 10:00 AM Garrett Lloyd DPM [facility]   7/23/2025 10:15 AM CV [facility] ECHO [facility] CARDIA [place]   7/30/2025  8:30 AM NOM OIC-MRI1 [facility]   7/30/2025 10:00 AM Bienvenido Henson MD NOM RAD ONC [address]   8/15/2025  9:20 AM Bienvenido Ward MD [facility] PULMON [place]   8/27/2025  9:00 AM Guido Trejo MD [facility] ENT [address]   9/11/2025  8:30 AM Yan Palmer MD [facility] DERM [facility]         Expected Discharge Date: 7/7/2025     Comments/Recommendations:  7/7 referral send to [facility] for respite care & [facility]      Family Conference:    No    Attendees Present:    {Annie Spicer, RN CM Kathryn Abadie, SALO Perkins, SALO Villafana, [facility]  Freida Guerrero, MD Ernestine Gaspar, NP  Shayy Christy, RRT  Kei RICHMOND Director of Case Management      Continued/Extended Stay Review:    Management of at least one of the following complex respiratory conditions:  Other acute on chronic  respiratory failure with hypoxia    Must meet at least three of the following concomitant treatments/intervention daily unless notes: (excludes PO meds unless notes)  Hemodynamic monitoring  Oxygen and SaO2/ABG adjustments and greater than or equal to 28% supplemental O2  Other comfort care

## 2025-07-03 NOTE — PROGRESS NOTES
With his consent, pt was placed on his home CPAP, with O2 bleed in, for cleaning/bathing at this time.

## 2025-07-03 NOTE — PT/OT/SLP DISCHARGE
Occupational Therapy Discharge Summary    Jordin Allen Jr.  MRN: [id number]   Principal Problem: Acute on chronic respiratory failure with hypoxia      Patient Discharged from acute Occupational Therapy on 7/3/25.  Please refer to prior OT note dated 25 for functional status.    Assessment:      Patient transferred to lower level of care secondary to transition to comfort care    Objective:     GOALS:   Multidisciplinary Problems       Occupational Therapy Goals          Problem: Occupational Therapy    Goal Priority Disciplines Outcome Interventions   Occupational Therapy Goal     OT, PT/OT Progressing    Description: Goals to be met by: 25  Patient will increase functional independence with ADLs by performing:    UE Dressing with Set-up Assistance. Discontinued  LE Dressing with SBA using appropriate AE as needed. Discontinued  Grooming while seated at sink with Set-up Assistance. Discontinued  Toileting from 3-in-1 commode over toilet with min A for hygiene and clothing management. Discontinued  Supine to sit with SPV . Discontinued  Step transfer with min A with RW. Discontinued  Toilet transfer to 3-in-1 commode over toilet with min A with RW. Discontinued    Revised goals as of 25  Pt will tolerate bed level ADLs, such as grooming and oral hygiene with minimal cueing for pursed lip breathing.   Pt will tolerate bed level active and passive range of motion program to BUE to maintain joint integrity, prevent stiffness, and decrease pain.  Pt and family will be educated in optimal positioning and demo teach back understanding to prevent deformity and maintain joint integrity   Pt will be educated in coping and stress management strategies and demo use of those strategies during functional tasks.                          Reasons for Discontinuation of Therapy Services  Transition to comfort       Plan:     Patient Discharged to: Palliative Care/Hospice    7/3/2025

## 2025-07-03 NOTE — PT/OT/SLP PROGRESS
Occupational Therapy   Treatment    Name: Jordin Allen Jr.  MRN: [id number]  Admitting Diagnosis:  Acute on chronic respiratory failure with hypoxia       Recommendations:     Discharge Recommendations: Moderate Intensity Therapy  Discharge Equipment Recommendations:  to be determined by next level of care  Barriers to discharge:  Decreased caregiver support, Inaccessible home environment    Assessment:     Jordin Allen Jr. is a 79 y.o. male with a medical diagnosis of Acute on chronic respiratory failure with hypoxia.  He presents with performance deficits affecting function are weakness, impaired functional mobility, impaired cardiopulmonary response to activity, gait instability, impaired endurance, impaired balance, decreased upper extremity function, impaired self care skills, decreased lower extremity function, orthopedic precautions.     Rehab Prognosis:  Poor; patient would benefit from acute skilled OT services to address these deficits and reach maximum level of function.       Plan:     Patient to be seen 3 x/week to address the above listed problems via self-care/home management, therapeutic activities, therapeutic exercises  Plan of Care Expires: 07/23/25  Plan of Care Reviewed with: patient    Subjective     Chief Complaint: SOB, weakness  Patient/Family Comments/goals: I can't breathe when I move  Pain/Comfort:  Pain Rating 1: 0/10  Pain Rating Post-Intervention 1: 0/10    Objective:     Communicated with: nurse and RT prior to session.  Patient found HOB elevated with blood pressure cuff, oxygen, peripheral IV, telemetry upon OT entry to room.    General Precautions: Standard, fall, respiratory    Orthopedic Precautions:spinal precautions  Braces: TLSO  Respiratory Status: Nasal cannula, flow 5 L/min     Occupational Performance:       AMPA 6 Click ADL: 13    Treatment & Education:  Bed placed in reverse Trendelenburg to get HOB elevated as pt unable to tolerate adjusting HOB.   Vitals: HR 87,  RR 17, SpO2 91%  Pt performed AROM ex 2x 5 reps shld flex, chest press, knee/hip flex/ext with rest breaks between.  Vitals post: HR 97, RR up to 25, SpO2 down to as low as 85%. Vitals improved to close to baseline w/increasing O2 to 7L(discussed w/RT and Nsg)      Patient left HOB elevated with all lines intact, call button in reach, and nurse and RT notified    GOALS:   Multidisciplinary Problems       Occupational Therapy Goals          Problem: Occupational Therapy    Goal Priority Disciplines Outcome Interventions   Occupational Therapy Goal     OT, PT/OT Progressing    Description: Goals to be met by: 7/23/25  Patient will increase functional independence with ADLs by performing:    UE Dressing with Set-up Assistance. Discontinued  LE Dressing with SBA using appropriate AE as needed. Discontinued  Grooming while seated at sink with Set-up Assistance. Discontinued  Toileting from 3-in-1 commode over toilet with min A for hygiene and clothing management. Discontinued  Supine to sit with SPV . Discontinued  Step transfer with min A with RW. Discontinued  Toilet transfer to 3-in-1 commode over toilet with min A with RW. Discontinued    Revised goals as of 7/1/25  Pt will tolerate bed level ADLs, such as grooming and oral hygiene with minimal cueing for pursed lip breathing.   Pt will tolerate bed level active and passive range of motion program to BUE to maintain joint integrity, prevent stiffness, and decrease pain.  Pt and family will be educated in optimal positioning and demo teach back understanding to prevent deformity and maintain joint integrity   Pt will be educated in coping and stress management strategies and demo use of those strategies during functional tasks.                          DME Justifications:  No DME recommended requiring DME justifications    Time Tracking:     OT Date of Treatment: 07/02/25  OT Start Time: 0932  OT Stop Time: 0947  OT Total Time (min): 15 min    Billable  Minutes:Therapeutic Exercise 15    OT/JOSSUE: OT     Number of JOSSUE visits since last OT visit: 0    7/2/2025

## 2025-07-03 NOTE — PROGRESS NOTES
Referral sent to [facility] for [facility] at [facility] (respite to hospice), awaiting approval

## 2025-07-03 NOTE — NURSING
Vitals stable. Alert and oriented x4. Patient's O2 sat maintained on 8L N/C. Patient was placed on CPAP during pericare and bath. BM this shift. Urinal at bedside. Received PRN IVP morphine once this shift for air hunger; effective. Left arm midline dressing intact; flushes without difficulty. Continuous pulse ox discontinued. Patient on comfort measures. Diet changed to regular. Patient repositioned as he desired. Bed in lowest position, wheels locked  and call light within patient's reach.

## 2025-07-03 NOTE — CARE UPDATE
07/03/25 0638   Patient Assessment/Suction   Level of Consciousness (AVPU) alert   Respiratory Effort Labored;Gasping   Expansion/Accessory Muscles/Retractions expansion symmetric;no retractions   All Lung Fields Breath Sounds diminished   Rhythm/Pattern, Respiratory shortness of breath;shallow;labored   Cough Frequency infrequent   Cough Type weak   PRE-TX-O2   Device (Oxygen Therapy) high flow nasal cannula w/device   $ Noninvasive Daily Charge Noninvasive Daily   Flow (L/min) (Oxygen Therapy) 8   Oxygen Concentration (%) 45   SpO2 99 %   Pulse Oximetry Type Continuous   $ Pulse Oximetry - Multiple Charge Pulse Oximetry - Multiple   Oximetry Probe Status Assessed;Intact   Pulse 91   Resp (!) 24   Positioning HOB elevated 30 degrees   Aerosol Therapy   $ Aerosol Therapy Charges Aerosol Treatment   Daily Review of Necessity (SVN) completed   Respiratory Treatment Status (SVN) given   Treatment Route (SVN) mask   Patient Position HOB elevated   Post Treatment Assessment (SVN) breath sounds improved   Signs of Intolerance (SVN) none   Breath Sounds Post-Respiratory Treatment   Throughout All Fields Post-Treatment All Fields   Throughout All Fields Post-Treatment aeration increased   Post-treatment Heart Rate (beats/min) 90   Post-treatment Resp Rate (breaths/min) 22   Vibratory PEP Therapy   Route (PEP Therapy) unable to perform   Respiratory Evaluation   $ Care Plan Tech Time 15 min

## 2025-07-03 NOTE — PLAN OF CARE
Patient VSS. Morphine x1. Ativan x 1 when patient was repositioned in bed and felt shortness of breath.  Able to voice needs. Urinal at the bedside. Call light in reach. Will continue to monitor.     Problem: Adult Inpatient Plan of Care  Goal: Absence of Hospital-Acquired Illness or Injury  Outcome: Progressing     Problem: Adult Inpatient Plan of Care  Goal: Optimal Comfort and Wellbeing  Outcome: Progressing     Problem: Pneumonia  Goal: Fluid Balance  Outcome: Progressing     Problem: Pneumonia  Goal: Resolution of Infection Signs and Symptoms  Outcome: Progressing

## 2025-07-03 NOTE — CARE UPDATE
07/02/25 2120   Patient Assessment/Suction   Level of Consciousness (AVPU) alert   Respiratory Effort Normal;Unlabored   Expansion/Accessory Muscles/Retractions expansion symmetric;no retractions;no use of accessory muscles   All Lung Fields Breath Sounds diminished   Rhythm/Pattern, Respiratory depth regular;pattern regular;unlabored   Cough Frequency infrequent   Cough Type good   PRE-TX-O2   Device (Oxygen Therapy) high flow nasal cannula w/device   $ Is the patient on High Flow Oxygen? Yes   Flow (L/min) (Oxygen Therapy) 6   Oxygen Concentration (%) 40   SpO2 (!) 94 %   Pulse Oximetry Type Continuous   $ Pulse Oximetry - Multiple Charge Pulse Oximetry - Multiple   Oximetry Probe Status Assessed;Intact   Pulse 91   Resp (!) 22   Positioning HOB elevated 30 degrees   Aerosol Therapy   $ Aerosol Therapy Charges Aerosol Treatment   Daily Review of Necessity (SVN) completed   Respiratory Treatment Status (SVN) given   Treatment Route (SVN) mask   Patient Position HOB elevated   Post Treatment Assessment (SVN) breath sounds unchanged   Signs of Intolerance (SVN) none   Breath Sounds Post-Respiratory Treatment   Throughout All Fields Post-Treatment All Fields   Throughout All Fields Post-Treatment no change   Post-treatment Heart Rate (beats/min) 88   Post-treatment Resp Rate (breaths/min) 19   Vibratory PEP Therapy   Route (PEP Therapy) refused

## 2025-07-04 PROCEDURE — 25000003 PHARM REV CODE 250: Performed by: HOSPITALIST

## 2025-07-04 PROCEDURE — 94640 AIRWAY INHALATION TREATMENT: CPT

## 2025-07-04 PROCEDURE — 25000242 PHARM REV CODE 250 ALT 637 W/ HCPCS: Performed by: STUDENT IN AN ORGANIZED HEALTH CARE EDUCATION/TRAINING PROGRAM

## 2025-07-04 PROCEDURE — C1751 CATH, INF, PER/CENT/MIDLINE: HCPCS

## 2025-07-04 PROCEDURE — 25000003 PHARM REV CODE 250: Performed by: STUDENT IN AN ORGANIZED HEALTH CARE EDUCATION/TRAINING PROGRAM

## 2025-07-04 PROCEDURE — 11000001 HC ACUTE MED/SURG PRIVATE ROOM

## 2025-07-04 PROCEDURE — 25000003 PHARM REV CODE 250

## 2025-07-04 PROCEDURE — 99900031 HC PATIENT EDUCATION (STAT)

## 2025-07-04 PROCEDURE — 25000242 PHARM REV CODE 250 ALT 637 W/ HCPCS: Performed by: FAMILY MEDICINE

## 2025-07-04 PROCEDURE — 94760 N-INVAS EAR/PLS OXIMETRY 1: CPT

## 2025-07-04 PROCEDURE — 94799 UNLISTED PULMONARY SVC/PX: CPT

## 2025-07-04 PROCEDURE — 27000221 HC OXYGEN, UP TO 24 HOURS

## 2025-07-04 PROCEDURE — 27100171 HC OXYGEN HIGH FLOW UP TO 24 HOURS

## 2025-07-04 PROCEDURE — 99900035 HC TECH TIME PER 15 MIN (STAT)

## 2025-07-04 PROCEDURE — 99231 SBSQ HOSP IP/OBS SF/LOW 25: CPT | Mod: ,,, | Performed by: INTERNAL MEDICINE

## 2025-07-04 PROCEDURE — 63600175 PHARM REV CODE 636 W HCPCS: Performed by: STUDENT IN AN ORGANIZED HEALTH CARE EDUCATION/TRAINING PROGRAM

## 2025-07-04 RX ADMIN — LEVALBUTEROL HYDROCHLORIDE 1.25 MG: 0.63 SOLUTION RESPIRATORY (INHALATION) at 01:07

## 2025-07-04 RX ADMIN — AMIODARONE HYDROCHLORIDE 200 MG: 200 TABLET ORAL at 08:07

## 2025-07-04 RX ADMIN — LEVALBUTEROL HYDROCHLORIDE 1.25 MG: 0.63 SOLUTION RESPIRATORY (INHALATION) at 07:07

## 2025-07-04 RX ADMIN — Medication: at 08:07

## 2025-07-04 RX ADMIN — MORPHINE SULFATE 2 MG: 2 INJECTION, SOLUTION INTRAMUSCULAR; INTRAVENOUS at 08:07

## 2025-07-04 RX ADMIN — CETIRIZINE HYDROCHLORIDE 10 MG: 10 TABLET, FILM COATED ORAL at 08:07

## 2025-07-04 RX ADMIN — POLYETHYLENE GLYCOL 3350 17 G: 17 POWDER, FOR SOLUTION ORAL at 08:07

## 2025-07-04 RX ADMIN — AMITRIPTYLINE HYDROCHLORIDE 25 MG: 25 TABLET, FILM COATED ORAL at 08:07

## 2025-07-04 RX ADMIN — BUDESONIDE 0.5 MG: 0.5 SUSPENSION RESPIRATORY (INHALATION) at 07:07

## 2025-07-04 RX ADMIN — LEVETIRACETAM 500 MG: 500 TABLET, FILM COATED ORAL at 08:07

## 2025-07-04 RX ADMIN — LORAZEPAM 1 MG: 2 INJECTION INTRAMUSCULAR; INTRAVENOUS at 08:07

## 2025-07-04 RX ADMIN — ROFLUMILAST 500 MCG: 500 TABLET ORAL at 08:07

## 2025-07-04 RX ADMIN — LEVALBUTEROL HYDROCHLORIDE 1.25 MG: 0.63 SOLUTION RESPIRATORY (INHALATION) at 12:07

## 2025-07-04 RX ADMIN — GUAIFENESIN 1200 MG: 600 TABLET, MULTILAYER, EXTENDED RELEASE ORAL at 08:07

## 2025-07-04 RX ADMIN — FLUTICASONE PROPIONATE 100 MCG: 50 SPRAY, METERED NASAL at 08:07

## 2025-07-04 NOTE — PLAN OF CARE
Problem: Adult Inpatient Plan of Care  Goal: Absence of Hospital-Acquired Illness or Injury  Outcome: Progressing     Problem: Wound  Goal: Skin Health and Integrity  Outcome: Progressing     Problem: Fall Injury Risk  Goal: Absence of Fall and Fall-Related Injury  Outcome: Progressing

## 2025-07-04 NOTE — CARE UPDATE
07/03/25 2007   Patient Assessment/Suction   Level of Consciousness (AVPU) responds to voice   Respiratory Effort Unlabored   Expansion/Accessory Muscles/Retractions expansion symmetric;no retractions;no use of accessory muscles   All Lung Fields Breath Sounds diminished   Rhythm/Pattern, Respiratory depth regular;pattern regular;unlabored   Cough Frequency no cough   PRE-TX-O2   Device (Oxygen Therapy) high flow nasal cannula w/device   $ Is the patient on High Flow Oxygen? Yes   $ Noninvasive Daily Charge Noninvasive Daily   Flow (L/min) (Oxygen Therapy) 7   Oxygen Concentration (%) 42   SpO2 97 %   Pulse Oximetry Type Continuous   $ Pulse Oximetry - Multiple Charge Pulse Oximetry - Multiple   Oximetry Probe Status Assessed;Intact   Pulse 84   Resp 20   Positioning HOB elevated 30 degrees   Aerosol Therapy   $ Aerosol Therapy Charges Aerosol Treatment   Daily Review of Necessity (SVN) completed   Respiratory Treatment Status (SVN) given   Treatment Route (SVN) mask   Patient Position HOB elevated   Post Treatment Assessment (SVN) breath sounds unchanged   Signs of Intolerance (SVN) none   Breath Sounds Post-Respiratory Treatment   Throughout All Fields Post-Treatment All Fields   Throughout All Fields Post-Treatment aeration increased   Post-treatment Heart Rate (beats/min) 80   Post-treatment Resp Rate (breaths/min) 18   Airway Safety   Is Ambu Bag and Mask with Patient? Yes, Adult Ambu Bag and Mask   O2  at bedside? No   Suction set is at the bedside? Yes   Communication board is with the patient? No   Respiratory Interventions   Cough And Deep Breathing done independently per patient   Airway/Ventilation Management airway patency maintained   Airway/Ventilation Support comfort measures provided   Breathing Techniques/Airway Clearance deep/controlled cough encouraged   Respiratory Evaluation   $ Care Plan Tech Time 15 min

## 2025-07-04 NOTE — PLAN OF CARE
Problem: Pneumonia  Goal: Effective Oxygenation and Ventilation  Outcome: Progressing     Problem: Pneumonia  Goal: Effective Oxygenation and Ventilation  Outcome: Progressing     Problem: Breathing Pattern Ineffective  Goal: Effective Breathing Pattern  Outcome: Progressing     Problem: Airway Clearance Ineffective  Goal: Effective Airway Clearance  Outcome: Progressing     Problem: Gas Exchange Impaired  Goal: Optimal Gas Exchange  Outcome: Progressing     Problem: Noninvasive Ventilation Acute  Goal: Effective Unassisted Ventilation and Oxygenation  Outcome: Unable to Meet

## 2025-07-04 NOTE — PLAN OF CARE
Patient resting comfortably since administering 2 mg of Morphine in the earlier part of shift, vitals stable during shift, chest rising and falling with nonlabored breaths, patient able to take pills whole one at a time with coke to drink, no swallowing issues during shift, No complaints of pain noted throughout shift. Bed in the lowest position, call light in reach, will continue to monitor.         Problem: Adult Inpatient Plan of Care  Goal: Plan of Care Review  Outcome: Progressing  Goal: Patient-Specific Goal (Individualized)  Outcome: Progressing  Goal: Absence of Hospital-Acquired Illness or Injury  Outcome: Progressing  Goal: Optimal Comfort and Wellbeing  Outcome: Progressing  Goal: Readiness for Transition of Care  Outcome: Progressing     Problem: Pneumonia  Goal: Fluid Balance  Outcome: Progressing  Goal: Resolution of Infection Signs and Symptoms  Outcome: Progressing  Goal: Effective Oxygenation and Ventilation  Outcome: Progressing     Problem: Wound  Goal: Optimal Coping  Outcome: Progressing  Goal: Optimal Functional Ability  Outcome: Progressing  Goal: Absence of Infection Signs and Symptoms  Outcome: Progressing  Goal: Improved Oral Intake  Outcome: Progressing  Goal: Optimal Pain Control and Function  Outcome: Progressing  Goal: Skin Health and Integrity  Outcome: Progressing  Goal: Optimal Wound Healing  Outcome: Progressing     Problem: Skin Injury Risk Increased  Goal: Skin Health and Integrity  Outcome: Progressing     Problem: Fall Injury Risk  Goal: Absence of Fall and Fall-Related Injury  Outcome: Progressing     Problem: Breathing Pattern Ineffective  Goal: Effective Breathing Pattern  Outcome: Progressing     Problem: Airway Clearance Ineffective  Goal: Effective Airway Clearance  Outcome: Progressing     Problem: Gas Exchange Impaired  Goal: Optimal Gas Exchange  Outcome: Progressing     Problem: Noninvasive Ventilation Acute  Goal: Effective Unassisted Ventilation and  Oxygenation  Outcome: Progressing     Problem: Infection  Goal: Absence of Infection Signs and Symptoms  Outcome: Progressing     Problem: Airway Clearance Ineffective  Goal: Effective Airway Clearance  Outcome: Progressing     Problem: Noninvasive Ventilation Acute  Goal: Effective Unassisted Ventilation and Oxygenation  Outcome: Progressing     Problem: Infection  Goal: Absence of Infection Signs and Symptoms  Outcome: Progressing

## 2025-07-04 NOTE — PROGRESS NOTES
[facility] Medicine   Progress Note  Room: 207/207   Patient Name: Jordin Allen Jr.  MRN: [id number]  Admit Date: 6/3/2025   Length of Stay:  LOS: 31 days   Attending Physician: Bernardino Guthrie MD  Nurse Practitioner: Erinn Suggs NP    Date of Service: 07/04/2025    Principal Problem:    Acute on chronic respiratory failure with hypoxia    Brief HPI:     Jordin Allen Jr. is a 79 y.o. 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  He presented to [facility] ED on 01/22/2025 with complaints of shortness of breath and left foot redness.  Admitted to hospital medicine team for left foot cellulitis and acute hypoxic respiratory failure secondary to pneumonia/COPD exacerbation. CTA chest negative for PE. Emphysematous change with superimposed edema. Nodular focus within the anterior aspect of left upper lobe.  Started on nebs, prednisone, and empiric vanc/cefepime and doxycycline.  Respiratory panel positive for parainfluenza virus.  Course further complicated by sinus tach with PACs/18, for which he was started on diltiazem.  Blood cultures positive for staph, suspected to be contaminant.  Repeat blood cultures returned negative.  He will switch to Augmentin and doxy.  Ongoing episodes of SVT, thought to be due to underlying hypoxia.  He was able to be weaned down to high-flow nasal cannula 15 L.  Discharged to [facility] on 06/03/2025 for rehab, wound care, and O2 weaning.     6/9-6/11-sent out to [facility] for concerns of MI. Workup revealed findings concerning for pneumonia on CT. Started on empiric vanc and zosyn. Also started on 5 day course of prednisone for COPD exacerbation.    6/18 - 6/22-sent out to [facility] again for increasing O2 requirements and worsening AFib with RVR on diltiazem drip.  Use switch to amiodarone drip at [facility], and  transitioned to p.o. amiodarone successfully.  Infectious workup revealed concerns for aspiration pneumonia.  He was treated with vanc, cefepime, and levofloxacin.  Antibiotics de-escalated to oral levofloxacin.  He was weaned from BiPAP to nasal cannula at 3-5 L.      Interval History    Patient seen this morning, appears asleep - awakens easily to voice  Stated he feels SOB - O2 sats 98% on 4L NC. PRN morphine and lorazepam available.   Vital signs reviewed and stable, afebrile.     Pending  referrals to [facility] for respite care, tentative d/c 7/7      Subjective:     Review of Systems   Constitutional:  Positive for malaise/fatigue.   Respiratory:  Negative for cough, sputum production and shortness of breath.    Cardiovascular:  Negative for chest pain and leg swelling.   Gastrointestinal:  Negative for heartburn, nausea and vomiting.   Genitourinary:  Negative for dysuria and urgency.   Musculoskeletal:  Positive for joint pain (R shoulder). Negative for myalgias.   Neurological:  Positive for weakness. Negative for dizziness.   Psychiatric/Behavioral:  Negative for depression. The patient does not have insomnia.          Objective:     Vital Sign Range  Temp:  [97.5 °F (36.4 °C)-97.8 °F (36.6 °C)]   Pulse:  [79-89]   Resp:  [18-25]   BP: (116-131)/(59-72)   SpO2:  [94 %-100 %]     Body mass index is 28.49 kg/m².  Oxygen Concentration (%):  [4-42] 4        Intake/Output Summary (Last 24 hours) at 7/4/2025 0744  Last data filed at 7/4/2025 0611  Gross per 24 hour   Intake 480 ml   Output 726 ml   Net -246 ml       Physical Exam  Constitutional:       Appearance: He is ill-appearing.   HENT:      Head: Normocephalic and atraumatic.      Mouth/Throat:      Mouth: Mucous membranes are moist.      Pharynx: Oropharynx is clear.   Eyes:      Extraocular Movements: Extraocular movements intact.      Pupils: Pupils are equal, round, and reactive to light.   Cardiovascular:      Rate and Rhythm: Normal rate.  Rhythm irregular.   Pulmonary:      Breath sounds: Wheezing and rhonchi present.   Abdominal:      General: Bowel sounds are normal.      Palpations: Abdomen is soft.   Musculoskeletal:      Right lower leg: Edema present.      Left lower leg: Edema present.   Skin:     General: Skin is warm and dry.   Neurological:      Mental Status: He is alert and oriented to person, place, and time. Mental status is at baseline.   Psychiatric:         Mood and Affect: Affect is flat.         Wounds       Wound 05/19/25 0233 Moisture associated dermatitis Left dorsal Foot (Active)   05/19/25 0233 Foot   Present on Original Admission: Y   Primary Wound Type: Moisture ass   Side: Left   Orientation: dorsal        Wound 05/19/25 0231 Moisture associated dermatitis Left lateral Foot (Active)   05/19/25 0231 Foot   Present on Original Admission: Y   Primary Wound Type: Moisture ass   Side: Left   Orientation: lateral        Wound 05/19/25 0231 Pressure Injury Right anterior Ankle (Active)   05/19/25 0231 Ankle   Present on Original Admission: Y   Primary Wound Type: Pressure inj   Side: Right   Orientation: anterior        Wound 05/19/25 0233 Moisture associated dermatitis Left anterior Foot (Active)   05/19/25 0233 Foot   Present on Original Admission:    Primary Wound Type: Moisture ass   Side: Left   Orientation: anterior        Wound 06/04/25 1030 Moisture associated dermatitis Right lateral Abdomen (Active)   06/04/25 1030 Abdomen   Present on Original Admission: Y   Primary Wound Type: Moisture ass   Side: Right   Orientation: lateral        Wound 06/04/25 1030 Moisture associated dermatitis Left lateral Abdomen (Active)   06/04/25 1030 Abdomen   Present on Original Admission: Y   Primary Wound Type: Moisture ass   Side: Left   Orientation: lateral        Wound 06/04/25 1030 Moisture associated dermatitis Right Groin (Active)   06/04/25 1030 Groin   Present on Original Admission: Y   Primary Wound Type: Moisture ass   Side:  "Right        Wound 06/04/25 1030 Moisture associated dermatitis Left Groin (Active)   06/04/25 1030 Groin   Present on Original Admission: Y   Primary Wound Type: Moisture ass   Side: Left        Wound 06/04/25 1030 Moisture associated dermatitis Sacral spine (Active)   06/04/25 1030 Sacral spine   Present on Original Admission: Y   Primary Wound Type: Moisture ass         Wounds consistently followed by [facility] NP Sheree Tao     Labs:  Recent Labs   Lab 06/28/25  0359 06/30/25  0416 07/02/25  0428   WBC 9.18 11.06 10.06   HGB 9.0* 9.4* 8.8*   HCT 29.1* 29.4* 27.4*   PLT 87* 102* 125*     Recent Labs   Lab 06/30/25  0416 07/02/25  0428    138   K 3.8 4.1    101   CO2 29 27   BUN 23 24*   CREATININE 0.8 0.7   GLU 92 75   CALCIUM 8.8 8.6*   MG 2.0 2.1   PHOS 3.0 2.6*     Recent Labs   Lab 06/30/25  0416 07/02/25  0428   ALKPHOS 104 88   ALT 36 26   AST 26 21   ALBUMIN 1.7* 1.7*   PROT 5.0* 5.1*   BILITOT 0.7 0.8       No results for input(s): "POCTGLUCOSE" in the last 72 hours.      Meds Scheduled:   amiodarone  200 mg Oral Daily    amitriptyline  25 mg Oral QHS    ammonium lactate   Topical (Top) Daily    budesonide  0.5 mg Nebulization Q12H    cetirizine  10 mg Oral Daily    fluticasone propionate  2 spray Each Nostril Daily    guaiFENesin  1,200 mg Oral BID    levalbuterol  1.2495 mg Nebulization Q6H    levETIRAcetam  500 mg Oral BID    polyethylene glycol  17 g Oral BID    roflumilast  500 mcg Oral Daily       Current Inpatient Problem List:  Active Hospital Problems    Diagnosis  POA    *Acute on chronic respiratory failure with hypoxia [J96.21]  Yes    Comfort measures only status [Z51.5]  Not Applicable    Parainfluenza virus infection [B34.8]  Yes    Pneumonia of right lung due to infectious organism [J18.9]  Yes    Cellulitis [L03.90]  Yes    TIA (transient ischemic attack) [G45.9]  Yes     ASA and statin      Metastasis to brain [C79.31]  Yes    Adenocarcinoma, lung, 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       CAD (coronary artery disease) [I25.10]  Yes     Chronic     -[place] (10/31/13) for stemi: pLAD 100%, Cx with Li's, mRCA 40%, LVEF 55%,. LVEDP 15   -Xience 3.0 x 33 mm to pLAD, post dilated with 3.5 NC and 4.0 NC.   -TTE (8/14/13): LVEF 55%, grade I diastolic dysfunction      HTN (hypertension), benign [I10]  Yes    SHOLA (obstructive sleep apnea) [G47.33]  Yes     CPAP at night      GERD (gastroesophageal reflux disease) [K21.9]  Yes     Continue PPI        Resolved Hospital Problems   No resolved problems to display.         Assessment / Plan:     Acute respiratory failure with hypoxia  COPD with acute exacerbation  Parainfluenza virus infection  Pneumonia   On Symbicort Spiriva and Daliresp at home. Follows up with pulmonology outpatient.   Completed 7 day course of Augmentin and doxycycline on 06/01  Continue Daliresp 500mcg daily  Continue Xopenex p.r.n.   Continue weaning high-flow nasal cannula  Continue guaifenesin 1200 mg b.i.d.  Completed 7 day course of levofloxacin on 6/25  Pulmonology consulted, appreciate recommendations  On 4L nasal cannula this morning with O2 sats 98%    Comfort measures only   7/2Discussion held again today at bedside with Mr. Allen and daughter on phone. We again discussed goals of care, and Mr. Allen stated he is tired, and just wants to be comfortable. He would like to transition to hospice. We discussed discontinuing non-comfort focused medications as well as labs and monitoring, which he is in agreement with. We also discussed adding morphine for air hunger and ativan for anxiety. He's requesting a dose of morphine now.  Adjusting orders to reflect comfort measures. Can d/c labs and cardiac monitoring. Morphine and Ativan ordered prn.  Received p.r.n. morphine and Ativan overnight for air hunger and  anxiety   Ativan and morphine seem to be working well   7/4 continue comfort care measures    Constipation, new   Last Bowel Movement: 07/03/25  Continue MiraLax b.i.d.   Continue suppository p.r.n.      Thrombocytopenia, new   Aspirin and mirtazapine discontinued on 06/27  Platelets improved to 125   Discontinued labs as patient is now on comfort measures only      Chest pain, new   Resolved   Evaluated by cardiology while in the hospital  Will need outpatient follow up with Dr. Ba with Cardiology     Cellulitis   Wound followed and managed by consistent, contracted [facility] NP  Completed 7 day course of doxy and Augmentin     Restless leg syndrome   Continue amitriptyline 25 mg nightly     Adenocarcinoma, lung, left   Metastasis to brain  Completed treatment with chemo/XRT. Brain XRT for lesionsx2. off immunotherapy since 12/24  suspect the LLL area was consolidation of prior RXT changes and not malignancy and is nearly resolved.  CT on presentation with new SANJAY nodule, need op follow up with pulm  Continue prophylactic Keppra 500 mg b.i.d.    Right shoulder pain  Having some shoulder pain to his right shoulder, which seems to be some muscle spasms.    Continue Robaxin 500 mg q.i.d. p.r.n. on 6/6    Compression fracture of L1 vertebrae with routine healing   TLSO brace when out of bed     Dyslipidemia  TIA  CAD   Will discontinue aspirin and atorvastatin, transitioning to comfort measures     Tachycardia   Episodes of SVT while at the hospital   Evaluated by Cardiology   Likely worsened by underlying hypoxia   Supplemental O2   Uncontrolled AFib with RVR on 06/16, he was placed on diltiazem drip though rate remained difficult to control  Sent back to [facility] and ultimately started on amiodarone for rate control   Continue amiodarone 400 mg b.i.d. for 8 days, then transition to amiodarone 200 mg daily  Plan to continue amiodarone for comfort   Can discontinue cardiac monitoring       SHOLA   Continue CPAP  "nightly     GERD  Continue PPI daily     Left lateral foot wound   Right anterior ankle wound   Traumatic left dorsal foot wound   Left anterior foot venous ulcer   Skin tear right distal arm   Pressure injury nose  Wound followed and managed by consistent, contracted [facility] NP    ACP, 07/02/2025   This was a voluntary visit and the option to decline counseling was given  Length of discussion:  20 minutes  Life limiting problem:  Hypoxemia  Topics discussed:  Goals of care  Outcome of discussion:Mr. Allen stated he is tired, and just wants to be comfortable. He would like to transition to hospice. We discussed discontinuing non-comfort focused medications as well as labs and monitoring, which he is in agreement with. We also discussed adding morphine for air hunger and ativan for anxiety. He's requesting a dose of morphine now.  Adjusting orders to reflect comfort measures. Can d/c labs and cardiac monitoring. Morphine and Ativan ordered prn.  Decision Maker:Jordin Allen      ACP, 7/1/25  This was a voluntary visit and the option to decline counseling was given  Length of discussion: 16 minutes  Life limiting problem:  Respiratory failure  Topics discussed:  Hospice  Outcome of discussion: He states that he understands that he is likely at the end of life, and he "just wants to be comfortable". He would like to include his daughter on further discussions regarding hospice. Discussed with attending MD, may also be able to reach out to palliative care to see if patient could be transferred to [facility]. Giving dose of morphine 2mg for air hunger, and will reach out to palliative care today for further assistance.  Decision Maker: Jordin Allen          ACP 6/19  "Dr. Zaldivar and I held discussion with the patient regarding his goals of care. The patient states that he would want to be intubated for mechanical ventilation if he were to decompensate but in the event of a cardiac arrest, he would not want CPR " "and would want to be allowed to die peacefully." Per ALBA Argueta NP  Code status changed to DNR     Psychiatric Assessment  Patient Health Questionnaire-9 (PHQ-9)    Date:  06/05/2025  Total Score: 15  Screening is Positive   Diagnosis and Treatment Plan:  Moderate MDD   Continue amitriptyline 25 mg nightly   Increasing mirtazapine to 15 mg nightly, which will hopefully help with his appetite as well       Diet:  Cardiac   GI Ppx:  PPI   DVT Ppx:  Enoxaparin     Lines:  PIV   Drains:  None   Airways:n/a   Wounds:  Multiple    Goals of Care:   Restorative,  Treat infection  Optimize nutrition  Wound healing  Muscle strengthening  Restoration of ADL's  Improve mobility    Anticipated Disposition:    TBD    Follow up appointments:   Future Appointments   Date Time Provider Department Center   7/8/2025  9:15 AM CONSTANTINO VISUAL FIELDS [facility] OPHTHAL [facility]   7/8/2025 10:00 AM Cara Looney MD [facility] OPHTHAL [facility]   7/15/2025 10:00 AM Garrett Lloyd DPM NOM POD [facility]   7/23/2025 10:15 AM CV [facility] ECHO [facility] [place]   7/30/2025  8:30 AM NOM [facility]MRI1 [facility]   7/30/2025 10:00 AM Bienvenido Henson MD [facility] RAD ONC [facility]   8/15/2025  9:20 AM Bienvenido Ward MD [facility] PULMON [place]   8/27/2025  9:00 AM Guido Trejo MD [facility] ENT [facility]   9/11/2025  8:30 AM Yan Palmer MD [facility] DERM [facility]         Erinn Suggs, ORACIO  Hospital Medicine [facility]    I have spent 55 minutes reviewing patient records (not including ACP time), examining, and  of the patient/ patient's family with greater than 50% of the time spent with direct patient care and coordination.           "

## 2025-07-04 NOTE — NURSING
Patient resting,, comfort care in place. VSS, bed locked and in lowest position, no acute events this shift. Will continue to monitor.

## 2025-07-04 NOTE — PROGRESS NOTES
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 His illness coarse started in late May with PNA and hypoxemic RF and he was Dc'ed to the LTAC but was hospitalized again from  for concern of MI and PNA/COPDe and again  for above. He was on broad spectrum Abx which were weaned to Levaquin on DC after he was able to be weaned from BIPAP to NC 3-5LPM.      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 Completed CRT (24-24) with weekly carboplatin/paclitaxel (24-24). Currently getting consolidation durvalumab (started on 24, received 2 cycles) but held from 24-24 due to concern for radiation pneumonitis.  XRT to left hilum/lung 60 Gy completed 2024; brain metastases x 3 separate occasions with gamma knife 2024 (left thalamus), 10/2024 (right temporal), and 2025 (right temporal).  Treatment complicated by pneumonitis secondary to immunotherapy (durvalumab) vs radiation pneumonitis.  Failed durvalumab re-challenge in 2024.       Subjective:      No acute events reported.  Remains on nasal cannula at 4-6 lpm due to dyspnea and desaturation with slow recovery.  On full Comfort Measures now -- awaiting inpatient referral for respite care.  He is resting comfortably at the time of my visit.    Objective:   Last 24 Hour Vital Signs:  BP  Min: 115/55  Max: 129/72  Temp  Av.7 °F (36.5 °C)  Min: 97.5 °F (36.4 °C)  Max: 97.9 °F (36.6 °C)  Pulse  Avg:  "86.6  Min: 82  Max: 93  Resp  Av.8  Min: 18  Max: 25  SpO2  Av.2 %  Min: 94 %  Max: 100 %  Weight  Av.5 kg (192 lb 14.4 oz)  Min: 87.5 kg (192 lb 14.4 oz)  Max: 87.5 kg (192 lb 14.4 oz)  I/O last 3 completed shifts:  In: 480 [P.O.:480]  Out: 1176 [Urine:1176]    Physical Examination:  Gen: well developed, NAD  HEENT: NCAT, no LAD  CV:  no murmur, radial pulses equal  Pulm: ND on 5LPM, lung sounds wheezes bilaterally  Abd: soft, NTND  Ext: no edema  Neuro: AAOx3, no FND  Skin: warm, dry, intact, no rash  Psych:  Appropriate mood and affect, normal judgement    Laboratory:  Trended Lab Data:  Recent Labs     25  0428   WBC 10.06   HGB 8.8*   HCT 27.4*   *      K 4.1      CO2 27   BUN 24*   CREATININE 0.7   GLU 75   BILITOT 0.8   AST 21   ALT 26   ALKPHOS 88   CALCIUM 8.6*   ALBUMIN 1.7*   PROT 5.1*   MG 2.1   PHOS 2.6*       Cardiac: No results for input(s): "TROPONINI", "CKTOTAL", "CKMB", "BNP" in the last 168 hours.    Urinalysis:   Lab Results   Component Value Date    COLORU Yellow 2025    SPECGRAV 1.010 2025    NITRITE Negative 2025    KETONESU Trace (A) 2025    UROBILINOGEN Negative 2025       Microbiology:  Microbiology Results (last 7 days)       ** No results found for the last 168 hours. **              I have personally reviewed the above labs and imaging.    Current Medications:     Infusions:       Scheduled:   amiodarone  200 mg Oral Daily    amitriptyline  25 mg Oral QHS    ammonium lactate   Topical (Top) Daily    budesonide  0.5 mg Nebulization Q12H    cetirizine  10 mg Oral Daily    fluticasone propionate  2 spray Each Nostril Daily    guaiFENesin  1,200 mg Oral BID    levalbuterol  1.2495 mg Nebulization Q6H    levETIRAcetam  500 mg Oral BID    polyethylene glycol  17 g Oral BID    roflumilast  500 mcg Oral Daily        PRN:    Current Facility-Administered Medications:     acetaminophen, 650 mg, Oral, Q8H PRN    benzonatate, 100 " mg, Oral, TID PRN    bisacodyL, 10 mg, Rectal, Daily PRN    calcium carbonate, 500 mg, Oral, TID PRN    hydrOXYzine HCL, 25 mg, Oral, TID PRN    levalbuterol, 1.2495 mg, Nebulization, Q4H PRN    lorazepam, 1 mg, Intravenous, Q4H PRN    melatonin, 6 mg, Oral, Nightly PRN    methocarbamoL, 500 mg, Oral, TID PRN    morphine, 2 mg, Intravenous, Q4H PRN    naloxone, 0.02 mg, Intravenous, PRN    nitroGLYCERIN, 0.4 mg, Sublingual, Q5 Min PRN    ondansetron, 4 mg, Oral, Q6H PRN    oxyCODONE, 5 mg, Oral, Q4H PRN    sodium chloride 0.9%, 10 mL, Intravenous, Q12H PRN    Assessment:     Acute hypoxemic Respiratory failure  Pneumonia  COPD GOLD3  Lung Adenocarcinoma c/b radiation pneumonitis   Recent hospitalization with PNA and parainfluenza.  MN'ed with coarse of levofloxacin which finished 6/26. Finished course of prednisone for COPDe while inpatient.   -- Continue to wean Supplemental O2 for goal SpO2 88-92%  -- continue scheduled LAMA/ scheduled RHONDA q6h, budesonide nebs BID  -- asked RT to provide fan for dyspnea relief, unfortunately facility does not carry, Discussed with patient to ask family member to bring small portable one.   -- continue flutter therapy TID  -- continue working with PT, vertical positioning, aspiration ppx     SHOLA  -- continue nightly CPAP     Palliative Care  -- continue conversations with patient and family regarding goals of care  -- DNR status with no plans to escalate care in the event of deterioration    Mr. Allen has been hospitalized continuously since 5/22/2025.      There was no family at the bedside on rounds.      Pulmonary to sign off at this time.  Please call for questions as needed.

## 2025-07-04 NOTE — CARE UPDATE
Mr. Allen states he is feeling better. Pursed Lip Breathing Exercise instructed and performed. Thew  pt. Has fallen asleep during Pulmicort treatment and I was able to wean HFNC to 4L. I will continue to monitor the pt.

## 2025-07-04 NOTE — PROGRESS NOTES
07/03/25 0830        Wound 06/04/25 1030 Moisture associated dermatitis Sacral spine   Date First Assessed/Time First Assessed: 06/04/25 1030   Present on Original Admission: Yes  Primary Wound Type: (c) Moisture associated dermatitis  Location: (c) Sacral spine   Dressing Appearance Open to air   Care Cleansed with:;Wound cleanser   Dressing   (applied Triad paste, open to air)

## 2025-07-05 PROCEDURE — 25000003 PHARM REV CODE 250

## 2025-07-05 PROCEDURE — 99900031 HC PATIENT EDUCATION (STAT)

## 2025-07-05 PROCEDURE — 25000003 PHARM REV CODE 250: Performed by: STUDENT IN AN ORGANIZED HEALTH CARE EDUCATION/TRAINING PROGRAM

## 2025-07-05 PROCEDURE — 11000001 HC ACUTE MED/SURG PRIVATE ROOM

## 2025-07-05 PROCEDURE — 25000242 PHARM REV CODE 250 ALT 637 W/ HCPCS: Performed by: FAMILY MEDICINE

## 2025-07-05 PROCEDURE — 94761 N-INVAS EAR/PLS OXIMETRY MLT: CPT

## 2025-07-05 PROCEDURE — 25000003 PHARM REV CODE 250: Performed by: HOSPITALIST

## 2025-07-05 PROCEDURE — 27100171 HC OXYGEN HIGH FLOW UP TO 24 HOURS

## 2025-07-05 PROCEDURE — 99900035 HC TECH TIME PER 15 MIN (STAT)

## 2025-07-05 PROCEDURE — 94640 AIRWAY INHALATION TREATMENT: CPT

## 2025-07-05 PROCEDURE — 25000242 PHARM REV CODE 250 ALT 637 W/ HCPCS: Performed by: STUDENT IN AN ORGANIZED HEALTH CARE EDUCATION/TRAINING PROGRAM

## 2025-07-05 PROCEDURE — 94760 N-INVAS EAR/PLS OXIMETRY 1: CPT

## 2025-07-05 PROCEDURE — 63600175 PHARM REV CODE 636 W HCPCS: Performed by: STUDENT IN AN ORGANIZED HEALTH CARE EDUCATION/TRAINING PROGRAM

## 2025-07-05 RX ADMIN — LEVALBUTEROL HYDROCHLORIDE 1.25 MG: 0.63 SOLUTION RESPIRATORY (INHALATION) at 12:07

## 2025-07-05 RX ADMIN — BUDESONIDE 0.5 MG: 0.5 SUSPENSION RESPIRATORY (INHALATION) at 07:07

## 2025-07-05 RX ADMIN — AMITRIPTYLINE HYDROCHLORIDE 25 MG: 25 TABLET, FILM COATED ORAL at 08:07

## 2025-07-05 RX ADMIN — LEVETIRACETAM 500 MG: 500 TABLET, FILM COATED ORAL at 08:07

## 2025-07-05 RX ADMIN — FLUTICASONE PROPIONATE 100 MCG: 50 SPRAY, METERED NASAL at 08:07

## 2025-07-05 RX ADMIN — POLYETHYLENE GLYCOL 3350 17 G: 17 POWDER, FOR SOLUTION ORAL at 08:07

## 2025-07-05 RX ADMIN — LORAZEPAM 1 MG: 2 INJECTION INTRAMUSCULAR; INTRAVENOUS at 06:07

## 2025-07-05 RX ADMIN — LEVALBUTEROL HYDROCHLORIDE 1.25 MG: 0.63 SOLUTION RESPIRATORY (INHALATION) at 01:07

## 2025-07-05 RX ADMIN — LEVALBUTEROL HYDROCHLORIDE 1.25 MG: 0.63 SOLUTION RESPIRATORY (INHALATION) at 07:07

## 2025-07-05 RX ADMIN — LEVALBUTEROL HYDROCHLORIDE 1.25 MG: 0.63 SOLUTION RESPIRATORY (INHALATION) at 06:07

## 2025-07-05 RX ADMIN — ROFLUMILAST 500 MCG: 500 TABLET ORAL at 08:07

## 2025-07-05 RX ADMIN — LORAZEPAM 1 MG: 2 INJECTION INTRAMUSCULAR; INTRAVENOUS at 09:07

## 2025-07-05 RX ADMIN — AMIODARONE HYDROCHLORIDE 200 MG: 200 TABLET ORAL at 08:07

## 2025-07-05 RX ADMIN — Medication: at 08:07

## 2025-07-05 RX ADMIN — GUAIFENESIN 1200 MG: 600 TABLET, MULTILAYER, EXTENDED RELEASE ORAL at 08:07

## 2025-07-05 RX ADMIN — CETIRIZINE HYDROCHLORIDE 10 MG: 10 TABLET, FILM COATED ORAL at 08:07

## 2025-07-05 RX ADMIN — LEVALBUTEROL HYDROCHLORIDE 1.25 MG: 0.63 SOLUTION RESPIRATORY (INHALATION) at 11:07

## 2025-07-05 RX ADMIN — OXYCODONE HYDROCHLORIDE 5 MG: 5 TABLET ORAL at 09:07

## 2025-07-05 NOTE — PROGRESS NOTES
[facility] Medicine   Progress Note  Room: 207/207   Patient Name: Jordin Allen Jr.  MRN: [id number]  Admit Date: 6/3/2025   Length of Stay:  LOS: 32 days   Attending Physician: Bernardino Guthrie MD  Nurse Practitioner: Erinn Suggs NP    Date of Service: 07/05/2025    Principal Problem:    Acute on chronic respiratory failure with hypoxia    Brief HPI:     Jordin Allen Jr. is a 79 y.o. 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  He presented to [facility] ED on 01/22/2025 with complaints of shortness of breath and left foot redness.  Admitted to hospital medicine team for left foot cellulitis and acute hypoxic respiratory failure secondary to pneumonia/COPD exacerbation. CTA chest negative for PE. Emphysematous change with superimposed edema. Nodular focus within the anterior aspect of left upper lobe.  Started on nebs, prednisone, and empiric vanc/cefepime and doxycycline.  Respiratory panel positive for parainfluenza virus.  Course further complicated by sinus tach with PACs/18, for which he was started on diltiazem.  Blood cultures positive for staph, suspected to be contaminant.  Repeat blood cultures returned negative.  He will switch to Augmentin and doxy.  Ongoing episodes of SVT, thought to be due to underlying hypoxia.  He was able to be weaned down to high-flow nasal cannula 15 L.  Discharged to [facility] on 06/03/2025 for rehab, wound care, and O2 weaning.     6/9-6/11-sent out to [facility] for concerns of MI. Workup revealed findings concerning for pneumonia on CT. Started on empiric vanc and zosyn. Also started on 5 day course of prednisone for COPD exacerbation.    6/18 - 6/22-sent out to [facility] again for increasing O2 requirements and worsening AFib with RVR on diltiazem drip.  Use switch to amiodarone drip at [facility], and  transitioned to p.o. amiodarone successfully.  Infectious workup revealed concerns for aspiration pneumonia.  He was treated with vanc, cefepime, and levofloxacin.  Antibiotics de-escalated to oral levofloxacin.  He was weaned from BiPAP to nasal cannula at 3-5 L.      Interval History    NAEON reported by patient or nursing staff, continue comfort care measures  Vital signs stable, afebrile, on 5L NC O2 sats 100%  Pending  referrals to [facility] for respite care, tentative d/c 7/7      Subjective:     Review of Systems   Constitutional:  Positive for malaise/fatigue.   Respiratory:  Negative for cough, sputum production and shortness of breath.    Cardiovascular:  Negative for chest pain and leg swelling.   Gastrointestinal:  Negative for heartburn, nausea and vomiting.   Genitourinary:  Negative for dysuria and urgency.   Musculoskeletal:  Positive for joint pain (R shoulder). Negative for myalgias.   Neurological:  Positive for weakness. Negative for dizziness.   Psychiatric/Behavioral:  Negative for depression. The patient does not have insomnia.          Objective:     Vital Sign Range  Temp:  [97.8 °F (36.6 °C)-98.3 °F (36.8 °C)]   Pulse:  []   Resp:  [18-24]   BP: (112-128)/(55-64)   SpO2:  [96 %-100 %]     Body mass index is 27.84 kg/m².  Oxygen Concentration (%):  [4-5] 5        Intake/Output Summary (Last 24 hours) at 7/5/2025 0736  Last data filed at 7/4/2025 1715  Gross per 24 hour   Intake 300 ml   Output --   Net 300 ml       Physical Exam  Constitutional:       Appearance: He is ill-appearing.   HENT:      Head: Normocephalic and atraumatic.      Mouth/Throat:      Mouth: Mucous membranes are moist.      Pharynx: Oropharynx is clear.   Eyes:      Extraocular Movements: Extraocular movements intact.      Pupils: Pupils are equal, round, and reactive to light.   Cardiovascular:      Rate and Rhythm: Normal rate. Rhythm irregular.   Pulmonary:      Breath sounds: Wheezing and rhonchi present.    Abdominal:      General: Bowel sounds are normal.      Palpations: Abdomen is soft.   Musculoskeletal:      Right lower leg: Edema present.      Left lower leg: Edema present.   Skin:     General: Skin is warm and dry.   Neurological:      Mental Status: He is alert and oriented to person, place, and time. Mental status is at baseline.   Psychiatric:         Mood and Affect: Affect is flat.         Wounds       Wound 05/19/25 0233 Moisture associated dermatitis Left dorsal Foot (Active)   05/19/25 0233 Foot   Present on Original Admission: Y   Primary Wound Type: Moisture ass   Side: Left   Orientation: dorsal        Wound 05/19/25 0231 Moisture associated dermatitis Left lateral Foot (Active)   05/19/25 0231 Foot   Present on Original Admission: Y   Primary Wound Type: Moisture ass   Side: Left   Orientation: lateral        Wound 05/19/25 0231 Pressure Injury Right anterior Ankle (Active)   05/19/25 0231 Ankle   Present on Original Admission: Y   Primary Wound Type: Pressure inj   Side: Right   Orientation: anterior        Wound 05/19/25 0233 Moisture associated dermatitis Left anterior Foot (Active)   05/19/25 0233 Foot   Present on Original Admission:    Primary Wound Type: Moisture ass   Side: Left   Orientation: anterior        Wound 06/04/25 1030 Moisture associated dermatitis Right lateral Abdomen (Active)   06/04/25 1030 Abdomen   Present on Original Admission: Y   Primary Wound Type: Moisture ass   Side: Right   Orientation: lateral        Wound 06/04/25 1030 Moisture associated dermatitis Left lateral Abdomen (Active)   06/04/25 1030 Abdomen   Present on Original Admission: Y   Primary Wound Type: Moisture ass   Side: Left   Orientation: lateral        Wound 06/04/25 1030 Moisture associated dermatitis Right Groin (Active)   06/04/25 1030 Groin   Present on Original Admission: Y   Primary Wound Type: Moisture ass   Side: Right        Wound 06/04/25 1030 Moisture associated dermatitis Left Groin (Active)  "  06/04/25 1030 Groin   Present on Original Admission: Y   Primary Wound Type: Moisture ass   Side: Left        Wound 06/04/25 1030 Moisture associated dermatitis Sacral spine (Active)   06/04/25 1030 Sacral spine   Present on Original Admission: Y   Primary Wound Type: Moisture ass         Wounds consistently followed by [facility] NP Sheree Tao     Labs:  Recent Labs   Lab 06/30/25  0416 07/02/25  0428   WBC 11.06 10.06   HGB 9.4* 8.8*   HCT 29.4* 27.4*   * 125*     Recent Labs   Lab 06/30/25  0416 07/02/25  0428    138   K 3.8 4.1    101   CO2 29 27   BUN 23 24*   CREATININE 0.8 0.7   GLU 92 75   CALCIUM 8.8 8.6*   MG 2.0 2.1   PHOS 3.0 2.6*     Recent Labs   Lab 06/30/25  0416 07/02/25  0428   ALKPHOS 104 88   ALT 36 26   AST 26 21   ALBUMIN 1.7* 1.7*   PROT 5.0* 5.1*   BILITOT 0.7 0.8       No results for input(s): "POCTGLUCOSE" in the last 72 hours.      Meds Scheduled:   amiodarone  200 mg Oral Daily    amitriptyline  25 mg Oral QHS    ammonium lactate   Topical (Top) Daily    budesonide  0.5 mg Nebulization Q12H    cetirizine  10 mg Oral Daily    fluticasone propionate  2 spray Each Nostril Daily    guaiFENesin  1,200 mg Oral BID    levalbuterol  1.2495 mg Nebulization Q6H    levETIRAcetam  500 mg Oral BID    polyethylene glycol  17 g Oral BID    roflumilast  500 mcg Oral Daily       Current Inpatient Problem List:  Active Hospital Problems    Diagnosis  POA    *Acute on chronic respiratory failure with hypoxia [J96.21]  Yes    Comfort measures only status [Z51.5]  Not Applicable    Parainfluenza virus infection [B34.8]  Yes    Pneumonia of right lung due to infectious organism [J18.9]  Yes    Cellulitis [L03.90]  Yes    TIA (transient ischemic attack) [G45.9]  Yes     ASA and statin      Metastasis to brain [C79.31]  Yes    Adenocarcinoma, lung, left [C34.92]  Yes    Dyslipidemia [E78.5]  Yes    COPD with acute exacerbation [J44.1]  Yes     Taking symbicort and spiriva and " daliresp  Peak flow 270 l/min In April his Fev-1: 1.33 liters 47%.       CAD (coronary artery disease) [I25.10]  Yes     Chronic     -C (10/31/13) for stemi: pLAD 100%, Cx with Li's, mRCA 40%, LVEF 55%,. LVEDP 15   -Xience 3.0 x 33 mm to pLAD, post dilated with 3.5 NC and 4.0 NC.   -TTE (8/14/13): LVEF 55%, grade I diastolic dysfunction      HTN (hypertension), benign [I10]  Yes    SHOLA (obstructive sleep apnea) [G47.33]  Yes     CPAP at night      GERD (gastroesophageal reflux disease) [K21.9]  Yes     Continue PPI        Resolved Hospital Problems   No resolved problems to display.         Assessment / Plan:     Acute respiratory failure with hypoxia  COPD with acute exacerbation  Parainfluenza virus infection  Pneumonia   On Symbicort Spiriva and Daliresp at home. Follows up with pulmonology outpatient.   Completed 7 day course of Augmentin and doxycycline on 06/01  Continue Daliresp 500mcg daily  Continue Xopenex p.r.n.   Continue weaning high-flow nasal cannula  Continue guaifenesin 1200 mg b.i.d.  Completed 7 day course of levofloxacin on 6/25  Pulmonology consulted, appreciate recommendations  On 5L nasal cannula this morning with O2 sats 100%    Comfort measures only   7/2Discussion held again today at bedside with Mr. Allen and daughter on phone. We again discussed goals of care, and Mr. Allen stated he is tired, and just wants to be comfortable. He would like to transition to hospice. We discussed discontinuing non-comfort focused medications as well as labs and monitoring, which he is in agreement with. We also discussed adding morphine for air hunger and ativan for anxiety. He's requesting a dose of morphine now.  Adjusting orders to reflect comfort measures. Can d/c labs and cardiac monitoring. Morphine and Ativan ordered prn.  Received p.r.n. morphine and Ativan overnight for air hunger and anxiety   Ativan and morphine seem to be working well   7/5 continue comfort care measures    Constipation,  new   Last Bowel Movement: 07/05/25  Continue MiraLax b.i.d.   Continue suppository p.r.n.      Thrombocytopenia, new   Aspirin and mirtazapine discontinued on 06/27  Platelets improved to 125   Discontinued labs as patient is now on comfort measures only      Chest pain, new   Resolved   Evaluated by cardiology while in the hospital  Will need outpatient follow up with Dr. Ba with Cardiology     Cellulitis   Wound followed and managed by consistent, contracted [facility] NP  Completed 7 day course of doxy and Augmentin     Restless leg syndrome   Continue amitriptyline 25 mg nightly     Adenocarcinoma, lung, left   Metastasis to brain  Completed treatment with chemo/XRT. Brain XRT for lesionsx2. off immunotherapy since 12/24  suspect the LLL area was consolidation of prior RXT changes and not malignancy and is nearly resolved.  CT on presentation with new SANJAY nodule, need op follow up with pulm  Continue prophylactic Keppra 500 mg b.i.d.    Right shoulder pain  Having some shoulder pain to his right shoulder, which seems to be some muscle spasms.    Continue Robaxin 500 mg q.i.d. p.r.n. on 6/6    Compression fracture of L1 vertebrae with routine healing   TLSO brace when out of bed     Dyslipidemia  TIA  CAD   Will discontinue aspirin and atorvastatin, transitioning to comfort measures     Tachycardia   Episodes of SVT while at the hospital   Evaluated by Cardiology   Likely worsened by underlying hypoxia   Supplemental O2   Uncontrolled AFib with RVR on 06/16, he was placed on diltiazem drip though rate remained difficult to control  Sent back to [facility] and ultimately started on amiodarone for rate control   Continue amiodarone 400 mg b.i.d. for 8 days, then transition to amiodarone 200 mg daily  Plan to continue amiodarone for comfort   Can discontinue cardiac monitoring       SHOLA   Continue CPAP nightly     GERD  Continue PPI daily     Left lateral foot wound   Right anterior ankle wound   Traumatic left  "dorsal foot wound   Left anterior foot venous ulcer   Skin tear right distal arm   Pressure injury nose  Wound followed and managed by consistent, contracted [facility] NP    ACP, 07/02/2025   This was a voluntary visit and the option to decline counseling was given  Length of discussion:  20 minutes  Life limiting problem:  Hypoxemia  Topics discussed:  Goals of care  Outcome of discussion:Mr. Allen stated he is tired, and just wants to be comfortable. He would like to transition to hospice. We discussed discontinuing non-comfort focused medications as well as labs and monitoring, which he is in agreement with. We also discussed adding morphine for air hunger and ativan for anxiety. He's requesting a dose of morphine now.  Adjusting orders to reflect comfort measures. Can d/c labs and cardiac monitoring. Morphine and Ativan ordered prn.  Decision Maker:Jordin Allen      ACP, 7/1/25  This was a voluntary visit and the option to decline counseling was given  Length of discussion: 16 minutes  Life limiting problem:  Respiratory failure  Topics discussed:  Hospice  Outcome of discussion: He states that he understands that he is likely at the end of life, and he "just wants to be comfortable". He would like to include his daughter on further discussions regarding hospice. Discussed with attending MD, may also be able to reach out to palliative care to see if patient could be transferred to HPU at [facility]. Giving dose of morphine 2mg for air hunger, and will reach out to palliative care today for further assistance.  Decision Maker: Jordin Allen          ACP 6/19  "Dr. Zaldivar and I held discussion with the patient regarding his goals of care. The patient states that he would want to be intubated for mechanical ventilation if he were to decompensate but in the event of a cardiac arrest, he would not want CPR and would want to be allowed to die peacefully." Per ALBA Argueta NP  Code status changed to DNR     Psychiatric " Assessment  Patient Health Questionnaire-9 (PHQ-9)    Date:  06/05/2025  Total Score: 15  Screening is Positive   Diagnosis and Treatment Plan:  Moderate MDD   Continue amitriptyline 25 mg nightly   Increasing mirtazapine to 15 mg nightly, which will hopefully help with his appetite as well       Diet:  Cardiac   GI Ppx:  PPI   DVT Ppx:  Enoxaparin     Lines:  PIV   Drains:  None   Airways:n/a   Wounds:  Multiple    Goals of Care:   Restorative,  Treat infection  Optimize nutrition  Wound healing  Muscle strengthening  Restoration of ADL's  Improve mobility    Anticipated Disposition:    TBD    Follow up appointments:   Future Appointments   Date Time Provider Department Center   7/8/2025  9:15 AM MEEKS, VISUAL FIELDS [facility] OPHTHAL [facility]   7/8/2025 10:00 AM Cara Looney MD [facility] OPHTHAL [facility]   7/15/2025 10:00 AM Garrett Lloyd DPM [facility] POD [facility] Ort   7/23/2025 10:15 AM CV [facility] ECHO [facility] CARDIA [place]   7/30/2025  8:30 AM [facility] OIC-MRI1 [facility] MRI [facility]   7/30/2025 10:00 AM Bienvenido Henson MD [facility] RAD ONC [facility]   8/15/2025  9:20 AM Bienvenido Ward MD [facility] PULMON [place]   8/27/2025  9:00 AM Guido Trejo MD [facility] ENT [facility]   9/11/2025  8:30 AM Yan Palmer MD [facility] DERM [facility]         Erinn Suggs NP  [facility] Medicine  [facility]    I have spent 55 minutes reviewing patient records (not including ACP time), examining, and  of the patient/ patient's family with greater than 50% of the time spent with direct patient care and coordination.

## 2025-07-05 NOTE — CARE UPDATE
"   07/04/25 1921   Patient Assessment/Suction   Level of Consciousness (AVPU) alert   Respiratory Effort Short of breath   Expansion/Accessory Muscles/Retractions expansion symmetric;no retractions;no use of accessory muscles   All Lung Fields Breath Sounds diminished   Rhythm/Pattern, Respiratory shortness of breath   Cough Frequency no cough   PRE-TX-O2   Device (Oxygen Therapy) high flow nasal cannula   Flow (L/min) (Oxygen Therapy) (S)  15  (FOUND PATIENT ON 10 LPM, INCREASED TO 15 LPM DUE TO PATIENT C/O DIFFICULTY BREATHING)   SpO2 98 %   Pulse Oximetry Type Intermittent   $ Pulse Oximetry - Single Charge Pulse Oximetry - Single   Pulse (!) 121   Resp 20   Aerosol Therapy   $ Aerosol Therapy Charges Aerosol Treatment   Daily Review of Necessity (SVN) completed   Respiratory Treatment Status (SVN) given   Treatment Route (SVN) mask   Patient Position HOB elevated   Post Treatment Assessment (SVN) patient reports breathing improved   Signs of Intolerance (SVN) none   Breath Sounds Post-Respiratory Treatment   Throughout All Fields Post-Treatment All Fields   Throughout All Fields Post-Treatment no change   Post-treatment Heart Rate (beats/min) 119   Post-treatment Resp Rate (breaths/min) 20   General Safety Checklist   Safety Promotion/Fall Prevention side rails raised   Airway Safety   Is Ambu Bag and Mask with Patient? Yes, Adult Ambu Bag and Mask   Suction set is at the bedside? Yes   Respiratory Interventions   Cough And Deep Breathing done independently per patient   Airway/Ventilation Management airway patency maintained;calming measures promoted;humidification applied;pulmonary hygiene promoted   Airway/Ventilation Support comfort measures provided;cough relief provided;dyspnea relief promoted;humidification applied;pulmonary hygiene promoted   Breathing Techniques/Airway Clearance deep/controlled cough encouraged;diaphragmatic breathing promoted   IBW/VT Calculations   Height 5' 9" (1.753 m)   IBW/kg " "(Calculated) Male 70.7 kg   Low Range Vt 4cc/kg MALE 282.8 mL   Low Range Vt 6cc/kg MALE 424.2 mL   Adult Moderate Range Vt 8cc/kg .6 mL   Adult High Range Vt 10cc/kg MALE 707 mL   Preset CPAP/BiPAP Settings   Mode Of Delivery home unit;standby   CPAP/BIPAP charged w/in last 24 h NO   Education   $ Education Bronchodilator;Oxygen;Other (see comment)  (CPAP)   Respiratory Evaluation   $ Care Plan Tech Time 15 min   Oxygen Care Plan   Oxygen Care Plan Per Protocol   SPO2 Goal (%) MD order   Rationale SpO2 is <MD Goal   Bronchodilator Care Plan   Bronchodilator Care Plan Aerosol   Aerosol Meds w/ frequency Xopenex(Levalbuterol HCL) 1.25mg Q 6Hr;Pulmicort(Budenoside) 0.5mg Q 12Hr   Rationale Wheezing;Chest "tightness" with breathing;Shortness of breath (increased WOB)       "

## 2025-07-05 NOTE — PLAN OF CARE
Problem: Pneumonia  Goal: Effective Oxygenation and Ventilation  Outcome: Progressing     Problem: Breathing Pattern Ineffective  Goal: Effective Breathing Pattern  Outcome: Not Progressing     Problem: Airway Clearance Ineffective  Goal: Effective Airway Clearance  Outcome: Progressing     Problem: Gas Exchange Impaired  Goal: Optimal Gas Exchange  Outcome: Progressing

## 2025-07-05 NOTE — RESPIRATORY THERAPY
At 1921 patient complained of difficulty breathing. I increased his flow from 10 to 15 LPM. Patient stated the change provided relief. At 2237 I decreased the flow from 15 to 10 LPM. Patient tolerated without any signs or symptoms of distress. Respiratory therapies performed without any adverse reactions. Patient refused his home CPAP this shift. As of this note he is resting comfortably. Will continue to monitor for the remainder of this shift.

## 2025-07-06 PROCEDURE — 94761 N-INVAS EAR/PLS OXIMETRY MLT: CPT

## 2025-07-06 PROCEDURE — 25000003 PHARM REV CODE 250: Performed by: STUDENT IN AN ORGANIZED HEALTH CARE EDUCATION/TRAINING PROGRAM

## 2025-07-06 PROCEDURE — 25000242 PHARM REV CODE 250 ALT 637 W/ HCPCS: Performed by: FAMILY MEDICINE

## 2025-07-06 PROCEDURE — 25000242 PHARM REV CODE 250 ALT 637 W/ HCPCS: Performed by: STUDENT IN AN ORGANIZED HEALTH CARE EDUCATION/TRAINING PROGRAM

## 2025-07-06 PROCEDURE — 25000003 PHARM REV CODE 250

## 2025-07-06 PROCEDURE — 63600175 PHARM REV CODE 636 W HCPCS: Performed by: STUDENT IN AN ORGANIZED HEALTH CARE EDUCATION/TRAINING PROGRAM

## 2025-07-06 PROCEDURE — 99900031 HC PATIENT EDUCATION (STAT)

## 2025-07-06 PROCEDURE — 27100171 HC OXYGEN HIGH FLOW UP TO 24 HOURS

## 2025-07-06 PROCEDURE — 99900035 HC TECH TIME PER 15 MIN (STAT)

## 2025-07-06 PROCEDURE — 94640 AIRWAY INHALATION TREATMENT: CPT

## 2025-07-06 PROCEDURE — 11000001 HC ACUTE MED/SURG PRIVATE ROOM

## 2025-07-06 PROCEDURE — C1751 CATH, INF, PER/CENT/MIDLINE: HCPCS

## 2025-07-06 PROCEDURE — 25000003 PHARM REV CODE 250: Performed by: HOSPITALIST

## 2025-07-06 PROCEDURE — 94760 N-INVAS EAR/PLS OXIMETRY 1: CPT

## 2025-07-06 RX ADMIN — AMIODARONE HYDROCHLORIDE 200 MG: 200 TABLET ORAL at 08:07

## 2025-07-06 RX ADMIN — Medication: at 08:07

## 2025-07-06 RX ADMIN — ROFLUMILAST 500 MCG: 500 TABLET ORAL at 08:07

## 2025-07-06 RX ADMIN — LEVETIRACETAM 500 MG: 500 TABLET, FILM COATED ORAL at 08:07

## 2025-07-06 RX ADMIN — AMITRIPTYLINE HYDROCHLORIDE 25 MG: 25 TABLET, FILM COATED ORAL at 08:07

## 2025-07-06 RX ADMIN — GUAIFENESIN 1200 MG: 600 TABLET, MULTILAYER, EXTENDED RELEASE ORAL at 08:07

## 2025-07-06 RX ADMIN — LEVALBUTEROL HYDROCHLORIDE 1.25 MG: 0.63 SOLUTION RESPIRATORY (INHALATION) at 06:07

## 2025-07-06 RX ADMIN — LEVALBUTEROL HYDROCHLORIDE 1.25 MG: 0.63 SOLUTION RESPIRATORY (INHALATION) at 07:07

## 2025-07-06 RX ADMIN — LEVALBUTEROL HYDROCHLORIDE 1.25 MG: 0.63 SOLUTION RESPIRATORY (INHALATION) at 12:07

## 2025-07-06 RX ADMIN — LORAZEPAM 1 MG: 2 INJECTION INTRAMUSCULAR; INTRAVENOUS at 04:07

## 2025-07-06 RX ADMIN — FLUTICASONE PROPIONATE 100 MCG: 50 SPRAY, METERED NASAL at 08:07

## 2025-07-06 RX ADMIN — BUDESONIDE 0.5 MG: 0.5 SUSPENSION RESPIRATORY (INHALATION) at 06:07

## 2025-07-06 RX ADMIN — LEVALBUTEROL HYDROCHLORIDE 1.25 MG: 0.63 SOLUTION RESPIRATORY (INHALATION) at 11:07

## 2025-07-06 RX ADMIN — LORAZEPAM 1 MG: 2 INJECTION INTRAMUSCULAR; INTRAVENOUS at 08:07

## 2025-07-06 RX ADMIN — BUDESONIDE 0.5 MG: 0.5 SUSPENSION RESPIRATORY (INHALATION) at 07:07

## 2025-07-06 RX ADMIN — MORPHINE SULFATE 2 MG: 2 INJECTION, SOLUTION INTRAMUSCULAR; INTRAVENOUS at 06:07

## 2025-07-06 RX ADMIN — CETIRIZINE HYDROCHLORIDE 10 MG: 10 TABLET, FILM COATED ORAL at 08:07

## 2025-07-06 NOTE — PLAN OF CARE
Uneventful day. Pt remains comfortable throughout day. Received lorazepam x2. Family at bedside throughout day. Pt not eating meals at this time. Taking morning medications and drinking small amounts of liquids. Refer to flowsheet for assessment.     Problem: Adult Inpatient Plan of Care  Goal: Absence of Hospital-Acquired Illness or Injury  Outcome: Progressing     Problem: Adult Inpatient Plan of Care  Goal: Optimal Comfort and Wellbeing  Outcome: Progressing

## 2025-07-06 NOTE — RESPIRATORY THERAPY
Pt was mildly labored at the beginning of the shift, o2 was increased to 8 liters, pt was left there due to pt having some inspiratory and expiratory wheezes during the night.

## 2025-07-06 NOTE — PLAN OF CARE
Problem: Breathing Pattern Ineffective  Goal: Effective Breathing Pattern  Outcome: Not Progressing     Problem: Airway Clearance Ineffective  Goal: Effective Airway Clearance  Outcome: Not Progressing     Problem: Gas Exchange Impaired  Goal: Optimal Gas Exchange  Outcome: Not Progressing

## 2025-07-06 NOTE — PROGRESS NOTES
[facility]   Progress Note  Room: 207/207   Patient Name: Jordin Allen Jr.  MRN: [id number]  Admit Date: 6/3/2025   Length of Stay:  LOS: 33 days   Attending Physician: Bernardino Guthrie MD  Nurse Practitioner: Erinn Suggs NP    Date of Service: 07/06/2025    Principal Problem:    Acute on chronic respiratory failure with hypoxia    Brief HPI:     Jordin Allen Jr. is a 79 y.o. 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  [place] [facility] [place] with complaints of shortness of breath and left foot redness.  Admitted to hospital medicine team for left foot cellulitis and acute hypoxic respiratory failure secondary to pneumonia/COPD exacerbation. CTA chest negative for PE. Emphysematous change with superimposed edema. Nodular focus within the anterior aspect of left upper lobe.  Started on nebs, prednisone, and empiric vanc/cefepime and doxycycline.  Respiratory panel positive for parainfluenza virus.  Course further complicated by sinus tach with PACs/18, for which he was started on diltiazem.  Blood cultures positive for staph, suspected to be contaminant.  Repeat blood cultures returned negative.  He will switch to Augmentin and doxy.  Ongoing episodes of SVT, thought to be due to underlying hypoxia.  He was able to be weaned down to high-flow nasal cannula 15 L.  Discharged to [facility] on 06/03/2025 for rehab, wound care, and O2 weaning.     6/9-6/11-[place] [facility] [place] concerns of [place]. Workup revealed findings concerning for pneumonia on CT. Started on empiric vanc and zosyn. Also started on 5 day course of prednisone for COPD exacerbation.    6/18 - 6/22-sent [place] [facility] [place] for increasing O2 requirements and worsening AFib with RVR on diltiazem drip.  Use switch to amiodarone drip [place] [facility] and  transitioned to p.o. amiodarone successfully.  Infectious workup revealed concerns for aspiration pneumonia.  He was treated with vanc, cefepime, and levofloxacin.  Antibiotics de-escalated to oral levofloxacin.  He was weaned from BiPAP to nasal cannula at 3-5 L.      Interval History    NAEON  continue comfort care measures  Patient's nurse reported no appetite and he did not eat nothing yesterday nor this morning, diet trays made PRN   Pending  referrals to [facility] for respite care, tentative d/c 7/7      Subjective:     Review of Systems   Constitutional:  Positive for malaise/fatigue.   Respiratory:  Negative for cough, sputum production and shortness of breath.    Cardiovascular:  Negative for chest pain and leg swelling.   Gastrointestinal:  Negative for heartburn, nausea and vomiting.   Genitourinary:  Negative for dysuria and urgency.   Musculoskeletal:  Positive for joint pain (R shoulder). Negative for myalgias.   Neurological:  Positive for weakness. Negative for dizziness.   Psychiatric/Behavioral:  Negative for depression. The patient does not have insomnia.          Objective:     Vital Sign Range  Temp:  [97.4 °F (36.3 °C)-98 °F (36.7 °C)]   Pulse:  []   Resp:  [18-26]   BP: (115-122)/(60-78)   SpO2:  [98 %-100 %]     Body mass index is 27.18 kg/m².  Oxygen Concentration (%):  [4-44] 44        Intake/Output Summary (Last 24 hours) at 7/6/2025 0753  Last data filed at 7/5/2025 1857  Gross per 24 hour   Intake 300 ml   Output --   Net 300 ml       Physical Exam  Constitutional:       Appearance: He is ill-appearing.   HENT:      Head: Normocephalic and atraumatic.      Mouth/Throat:      Mouth: Mucous membranes are moist.      Pharynx: Oropharynx is clear.   Eyes:      Extraocular Movements: Extraocular movements intact.      Pupils: Pupils are equal, round, and reactive to light.   Cardiovascular:      Rate and Rhythm: Normal rate. Rhythm irregular.   Pulmonary:      Breath sounds:  Wheezing and rhonchi present.   Abdominal:      General: Bowel sounds are normal.      Palpations: Abdomen is soft.   Musculoskeletal:      Right lower leg: Edema present.      Left lower leg: Edema present.   Skin:     General: Skin is warm and dry.   Neurological:      Mental Status: He is alert and oriented to person, place, and time. Mental status is at baseline.   Psychiatric:         Mood and Affect: Affect is flat.         Wounds       Wound 05/19/25 0233 Moisture associated dermatitis Left dorsal Foot (Active)   05/19/25 0233 Foot   Present on Original Admission: Y   Primary Wound Type: Moisture ass   Side: Left   Orientation: dorsal        Wound 05/19/25 0231 Moisture associated dermatitis Left lateral Foot (Active)   05/19/25 0231 Foot   Present on Original Admission: Y   Primary Wound Type: Moisture ass   Side: Left   Orientation: lateral        Wound 05/19/25 0231 Pressure Injury Right anterior Ankle (Active)   05/19/25 0231 Ankle   Present on Original Admission: Y   Primary Wound Type: Pressure inj   Side: Right   Orientation: anterior        Wound 05/19/25 0233 Moisture associated dermatitis Left anterior Foot (Active)   05/19/25 0233 Foot   Present on Original Admission:    Primary Wound Type: Moisture ass   Side: Left   Orientation: anterior        Wound 06/04/25 1030 Moisture associated dermatitis Right lateral Abdomen (Active)   06/04/25 1030 Abdomen   Present on Original Admission: Y   Primary Wound Type: Moisture ass   Side: Right   Orientation: lateral        Wound 06/04/25 1030 Moisture associated dermatitis Left lateral Abdomen (Active)   06/04/25 1030 Abdomen   Present on Original Admission: Y   Primary Wound Type: Moisture ass   Side: Left   Orientation: lateral        Wound 06/04/25 1030 Moisture associated dermatitis Right Groin (Active)   06/04/25 1030 Groin   Present on Original Admission: Y   Primary Wound Type: Moisture ass   Side: Right        Wound 06/04/25 1030 Moisture associated  "dermatitis Left Groin (Active)   06/04/25 1030 Groin   Present on Original Admission: Y   Primary Wound Type: Moisture ass   Side: Left        Wound 06/04/25 1030 Moisture associated dermatitis Sacral spine (Active)   06/04/25 1030 Sacral spine   Present on Original Admission: Y   Primary Wound Type: Moisture ass         Wounds consistently followed by [facility] NP Sheree Tao     Labs:  Recent Labs   Lab 06/30/25  0416 07/02/25  0428   WBC 11.06 10.06   HGB 9.4* 8.8*   HCT 29.4* 27.4*   * 125*     Recent Labs   Lab 06/30/25  0416 07/02/25  0428    138   K 3.8 4.1    101   CO2 29 27   BUN 23 24*   CREATININE 0.8 0.7   GLU 92 75   CALCIUM 8.8 8.6*   MG 2.0 2.1   PHOS 3.0 2.6*     Recent Labs   Lab 06/30/25  0416 07/02/25  0428   ALKPHOS 104 88   ALT 36 26   AST 26 21   ALBUMIN 1.7* 1.7*   PROT 5.0* 5.1*   BILITOT 0.7 0.8       No results for input(s): "POCTGLUCOSE" in the last 72 hours.      Meds Scheduled:   amiodarone  200 mg Oral Daily    amitriptyline  25 mg Oral QHS    ammonium lactate   Topical (Top) Daily    budesonide  0.5 mg Nebulization Q12H    cetirizine  10 mg Oral Daily    fluticasone propionate  2 spray Each Nostril Daily    guaiFENesin  1,200 mg Oral BID    levalbuterol  1.2495 mg Nebulization Q6H    levETIRAcetam  500 mg Oral BID    polyethylene glycol  17 g Oral BID    roflumilast  500 mcg Oral Daily       Current Inpatient Problem List:  Active Hospital Problems    Diagnosis  POA    *Acute on chronic respiratory failure with hypoxia [J96.21]  Yes    Comfort measures only status [Z51.5]  Not Applicable    Parainfluenza virus infection [B34.8]  Yes    Pneumonia of right lung due to infectious organism [J18.9]  Yes    Cellulitis [L03.90]  Yes    TIA (transient ischemic attack) [G45.9]  Yes     ASA and statin      Metastasis to brain [C79.31]  Yes    Adenocarcinoma, lung, left [C34.92]  Yes    Dyslipidemia [E78.5]  Yes    COPD with acute exacerbation [J44.1]  Yes     Taking " symbicort and spiriva and daliresp  Peak flow 270 l/min In April his Fev-1: 1.33 liters 47%.       CAD (coronary artery disease) [I25.10]  Yes     Chronic     -[facility] (10/31/13) for stemi: pLAD 100%, Cx with Li's, mRCA 40%, LVEF 55%,. LVEDP 15   -Xience 3.0 x 33 mm to pLAD, post dilated with 3.5 NC and 4.0 NC.   -TTE (8/14/13): LVEF 55%, grade I diastolic dysfunction      HTN (hypertension), benign [I10]  Yes    SHOLA (obstructive sleep apnea) [G47.33]  Yes     CPAP at night      GERD (gastroesophageal reflux disease) [K21.9]  Yes     Continue PPI        Resolved Hospital Problems   No resolved problems to display.         Assessment / Plan:     Acute respiratory failure with hypoxia  COPD with acute exacerbation  Parainfluenza virus infection  Pneumonia   On Symbicort Spiriva and Daliresp at home. Follows up with pulmonology outpatient.   Completed 7 day course of Augmentin and doxycycline on 06/01  Continue Daliresp 500mcg daily  Continue Xopenex p.r.n.   Continue weaning high-flow nasal cannula  Continue guaifenesin 1200 mg b.i.d.  Completed 7 day course of levofloxacin on 6/25  Pulmonology consulted, appreciate recommendations  On 5L nasal cannula this morning with O2 sats 100%    Comfort measures only   7/2Discussion held again today at bedside with Mr. Allen and daughter on phone. We again discussed goals of care, and Mr. Allen stated he is tired, and just wants to be comfortable. He would like to transition to hospice. We discussed discontinuing non-comfort focused medications as well as labs and monitoring, which he is in agreement with. We also discussed adding morphine for air hunger and ativan for anxiety. He's requesting a dose of morphine now.  Adjusting orders to reflect comfort measures. Can d/c labs and cardiac monitoring. Morphine and Ativan ordered prn.  Received p.r.n. morphine and Ativan overnight for air hunger and anxiety   Ativan and morphine seem to be working well   Continue comfort care  measures    Constipation, new   Last Bowel Movement: 07/06/25  Continue MiraLax b.i.d.   Continue suppository p.r.n.      Thrombocytopenia, new   Aspirin and mirtazapine discontinued on 06/27  Platelets improved to 125   Discontinued labs as patient is now on comfort measures only      Chest pain, new   Resolved   Evaluated by cardiology while in the hospital  Will need outpatient follow up with Dr. Ba with Cardiology     Cellulitis   Wound followed and managed by consistent, contracted [facility] NP  Completed 7 day course of doxy and Augmentin     Restless leg syndrome   Continue amitriptyline 25 mg nightly     Adenocarcinoma, lung, left   Metastasis to brain  Completed treatment with chemo/XRT. Brain XRT for lesionsx2. off immunotherapy since 12/24  suspect the LLL area was consolidation of prior RXT changes and not malignancy and is nearly resolved.  CT on presentation with new SANJAY nodule, need op follow up with pulm  Continue prophylactic Keppra 500 mg b.i.d.    Right shoulder pain  Having some shoulder pain to his right shoulder, which seems to be some muscle spasms.    Continue Robaxin 500 mg q.i.d. p.r.n. on 6/6    Compression fracture of L1 vertebrae with routine healing   TLSO brace when out of bed     Dyslipidemia  TIA  CAD   Will discontinue aspirin and atorvastatin, transitioning to comfort measures     Tachycardia   Episodes of SVT while at the hospital   Evaluated by Cardiology   Likely worsened by underlying hypoxia   Supplemental O2   Uncontrolled AFib with RVR on 06/16, he was placed on diltiazem drip though rate remained difficult to control  Sent back to [facility] and ultimately started on amiodarone for rate control   Continue amiodarone 400 mg b.i.d. for 8 days, then transition to amiodarone 200 mg daily  Plan to continue amiodarone for comfort   Can discontinue cardiac monitoring       SOHLA   Continue CPAP nightly     GERD  Continue PPI daily     Left lateral foot wound   Right anterior ankle  "wound   Traumatic left dorsal foot wound   Left anterior foot venous ulcer   Skin tear right distal arm   Pressure injury nose  Wound followed and managed by consistent, contracted [facility] NP    ACP, 07/02/2025   This was a voluntary visit and the option to decline counseling was given  Length of discussion:  20 minutes  Life limiting problem:  Hypoxemia  Topics discussed:  Goals of care  Outcome of discussion:Mr. Allen stated he is tired, and just wants to be comfortable. He would like to transition to hospice. We discussed discontinuing non-comfort focused medications as well as labs and monitoring, which he is in agreement with. We also discussed adding morphine for air hunger and ativan for anxiety. He's requesting a dose of morphine now.  Adjusting orders to reflect comfort measures. Can d/c labs and cardiac monitoring. Morphine and Ativan ordered prn.  Decision Maker:Jordin Allen      ACP, 7/1/25  This was a voluntary visit and the option to decline counseling was given  Length of discussion: 16 minutes  Life limiting problem:  Respiratory failure  Topics discussed:  Hospice  Outcome of discussion: He states that he understands that he is likely at the end of life, and he "just wants to be comfortable". He would like to include his daughter on further discussions regarding hospice. Discussed with attending MD, may also be able to reach out to palliative care to see if patient could be transferred to H[facility] at [facility]. Giving dose of morphine 2mg for air hunger, and will reach out to palliative care today for further assistance.  Decision Maker: Jordin Allen          ACP 6/19  "Dr. Zaldivar and I held discussion with the patient regarding his goals of care. The patient states that he would want to be intubated for mechanical ventilation if he were to decompensate but in the event of a cardiac arrest, he would not want CPR and would want to be allowed to die peacefully." Per ALBA Argueta NP  Code status changed to " DNR     Psychiatric Assessment  Patient Health Questionnaire-9 (PHQ-9)    Date:  06/05/2025  Total Score: 15  Screening is Positive   Diagnosis and Treatment Plan:  Moderate MDD   Continue amitriptyline 25 mg nightly   Increasing mirtazapine to 15 mg nightly, which will hopefully help with his appetite as well       Diet:  Cardiac, PRN as needed tray currently  GI Ppx:  PPI   DVT Ppx:  Enoxaparin     Lines:  PIV   Drains:  None   Airways:n/a   Wounds:  Multiple    Goals of Care:   Restorative,  Treat infection  Optimize nutrition  Wound healing  Muscle strengthening  Restoration of ADL's  Improve mobility    Anticipated Disposition:    TBD    Follow up appointments:   Future Appointments   Date Time Provider Department Center   7/8/2025  9:15 AM MEEKS, VISUAL FIELDS [facility] OPHTHAL [facility]   7/8/2025 10:00 AM Cara Looney MD [facility] OPHTHAL [facility]   7/15/2025 10:00 AM Garrett Lloyd DPM [facility] POD [facility] Ort   7/23/2025 10:15 AM CV [facility] ECHO [facility]   7/30/2025  8:30 AM [facility] OIC-MRI1 [facility] MRI [facility]   7/30/2025 10:00 AM Bienvenido Henson MD [facility] RAD ONC [facility]   8/15/2025  9:20 AM Bienvenido Ward MD [facility] PULMON [facility]   8/27/2025  9:00 AM Guido Trejo MD [facility] ENT [facility]   9/11/2025  8:30 AM Yan Palmer MD [facility] DERM [facility]         Erinn Suggs NP  [facility]  [facility]    I have spent 53 minutes reviewing patient records (not including ACP time), examining, and  of the patient/ patient's family with greater than 50% of the time spent with direct patient care and coordination.

## 2025-07-06 NOTE — CARE UPDATE
07/05/25 1910   Patient Assessment/Suction   Level of Consciousness (AVPU) alert   Respiratory Effort Mild;Labored   Expansion/Accessory Muscles/Retractions no retractions;expansion symmetric   All Lung Fields Breath Sounds diminished   Rhythm/Pattern, Respiratory pattern regular;depth regular   Cough Frequency infrequent;no cough   PRE-TX-O2   Device (Oxygen Therapy) high flow nasal cannula   Oxygen Concentration (%) 8   SpO2 99 %   Pulse Oximetry Type Intermittent   Pulse 97   Resp (!) 26   Aerosol Therapy   $ Aerosol Therapy Charges Aerosol Treatment   Respiratory Treatment Status (SVN) given   Treatment Route (SVN) air;mask   Patient Position HOB elevated   Post Treatment Assessment (SVN) increased aeration   Signs of Intolerance (SVN) none   Breath Sounds Post-Respiratory Treatment   Throughout All Fields Post-Treatment All Fields   Throughout All Fields Post-Treatment aeration increased   Post-treatment Heart Rate (beats/min) 98   Post-treatment Resp Rate (breaths/min) 26   General Safety Checklist   Safety Promotion/Fall Prevention side rails raised   Airway Safety   Is Ambu Bag and Mask with Patient? Yes, Adult Ambu Bag and Mask   Suction set is at the bedside? Yes   Ready to Wean/Extubation Screen   FIO2<=50 (chart decimal) 0.08

## 2025-07-06 NOTE — CARE UPDATE
07/05/25 1900   Patient Assessment/Suction   Level of Consciousness (AVPU) alert   Respiratory Effort Mild;Labored   Expansion/Accessory Muscles/Retractions no use of accessory muscles;no retractions;expansion symmetric   All Lung Fields Breath Sounds diminished   Rhythm/Pattern, Respiratory pattern regular;depth regular   Cough Frequency infrequent;no cough   PRE-TX-O2   Device (Oxygen Therapy) high flow nasal cannula   $ Is the patient on High Flow Oxygen? Yes   Oxygen Concentration (%) 8   SpO2 99 %   Pulse Oximetry Type Intermittent   $ Pulse Oximetry - Multiple Charge Pulse Oximetry - Multiple   Pulse 92   Resp (!) 25   Aerosol Therapy   $ Aerosol Therapy Charges Aerosol Treatment   Respiratory Treatment Status (SVN) given   Treatment Route (SVN) air;mask   Patient Position semi-Tapia's   Post Treatment Assessment (SVN) increased aeration   Signs of Intolerance (SVN) none   Breath Sounds Post-Respiratory Treatment   Throughout All Fields Post-Treatment All Fields   Throughout All Fields Post-Treatment aeration increased   Post-treatment Heart Rate (beats/min) 97   Post-treatment Resp Rate (breaths/min) 25   General Safety Checklist   Safety Promotion/Fall Prevention side rails raised   Airway Safety   Is Ambu Bag and Mask with Patient? Yes, Adult Ambu Bag and Mask   Suction set is at the bedside? Yes   Respiratory Interventions   Cough And Deep Breathing done independently per patient   Airway/Ventilation Management calming measures promoted;humidification applied;pulmonary hygiene promoted   Airway/Ventilation Support comfort measures provided;cough relief provided;dyspnea relief promoted;humidification applied;pulmonary hygiene promoted   Breathing Techniques/Airway Clearance deep/controlled cough encouraged   Ready to Wean/Extubation Screen   FIO2<=50 (chart decimal) 0.08   Preset CPAP/BiPAP Settings   Mode Of Delivery home unit;standby   CPAP/BIPAP charged w/in last 24 h NO   Patient CPAP/BiPAP Settings    CPAP/BIPAP ID home unit   Education   $ Education Bronchodilator;Oxygen   Respiratory Evaluation   $ Care Plan Tech Time 15 min   Oxygen Care Plan   Oxygen Care Plan Per Protocol   SPO2 Goal (%) 92% non-cardiac   Rationale SpO2 is <MD Goal   Bronchodilator Care Plan   Bronchodilator Care Plan Aerosol   Aerosol Meds w/ frequency Xopenex(Levalbuterol HCL) 1.25mg Q 6Hr;Pulmicort(Budenoside) 0.5mg BID   Rationale Shortness of breath (increased WOB)

## 2025-07-06 NOTE — PLAN OF CARE
Uneventful day. Did not eat any meals. Drinking fluids. Pain controlled. Air hunger controlled. Remains on 4-8L oxygen. Refer to flowsheet for assessment.     Problem: Adult Inpatient Plan of Care  Goal: Absence of Hospital-Acquired Illness or Injury  Outcome: Progressing     Problem: Adult Inpatient Plan of Care  Goal: Optimal Comfort and Wellbeing  Outcome: Progressing

## 2025-07-07 ENCOUNTER — TELEPHONE (OUTPATIENT)
Dept: PODIATRY | Facility: CLINIC | Age: 80
End: 2025-07-07
Payer: MEDICARE

## 2025-07-07 ENCOUNTER — PATIENT MESSAGE (OUTPATIENT)
Dept: PODIATRY | Facility: CLINIC | Age: 80
End: 2025-07-07
Payer: MEDICARE

## 2025-07-07 PROCEDURE — 25000242 PHARM REV CODE 250 ALT 637 W/ HCPCS: Performed by: STUDENT IN AN ORGANIZED HEALTH CARE EDUCATION/TRAINING PROGRAM

## 2025-07-07 PROCEDURE — 25000003 PHARM REV CODE 250: Performed by: STUDENT IN AN ORGANIZED HEALTH CARE EDUCATION/TRAINING PROGRAM

## 2025-07-07 PROCEDURE — C1751 CATH, INF, PER/CENT/MIDLINE: HCPCS

## 2025-07-07 PROCEDURE — 99900035 HC TECH TIME PER 15 MIN (STAT)

## 2025-07-07 PROCEDURE — 94799 UNLISTED PULMONARY SVC/PX: CPT

## 2025-07-07 PROCEDURE — 94640 AIRWAY INHALATION TREATMENT: CPT

## 2025-07-07 PROCEDURE — 25000242 PHARM REV CODE 250 ALT 637 W/ HCPCS: Performed by: FAMILY MEDICINE

## 2025-07-07 PROCEDURE — 11000001 HC ACUTE MED/SURG PRIVATE ROOM

## 2025-07-07 PROCEDURE — 27100171 HC OXYGEN HIGH FLOW UP TO 24 HOURS

## 2025-07-07 PROCEDURE — 25000003 PHARM REV CODE 250

## 2025-07-07 PROCEDURE — 94760 N-INVAS EAR/PLS OXIMETRY 1: CPT

## 2025-07-07 PROCEDURE — 27000221 HC OXYGEN, UP TO 24 HOURS

## 2025-07-07 PROCEDURE — 63600175 PHARM REV CODE 636 W HCPCS: Performed by: STUDENT IN AN ORGANIZED HEALTH CARE EDUCATION/TRAINING PROGRAM

## 2025-07-07 PROCEDURE — 25000003 PHARM REV CODE 250: Performed by: HOSPITALIST

## 2025-07-07 RX ORDER — LEVALBUTEROL INHALATION SOLUTION 0.63 MG/3ML
1.26 SOLUTION RESPIRATORY (INHALATION) EVERY 6 HOURS PRN
Status: DISCONTINUED | OUTPATIENT
Start: 2025-07-07 | End: 2025-07-10 | Stop reason: HOSPADM

## 2025-07-07 RX ORDER — BUDESONIDE 0.5 MG/2ML
0.5 INHALANT ORAL EVERY 6 HOURS PRN
Status: DISCONTINUED | OUTPATIENT
Start: 2025-07-07 | End: 2025-07-10 | Stop reason: HOSPADM

## 2025-07-07 RX ADMIN — LORAZEPAM 1 MG: 2 INJECTION INTRAMUSCULAR; INTRAVENOUS at 07:07

## 2025-07-07 RX ADMIN — POLYETHYLENE GLYCOL 3350 17 G: 17 POWDER, FOR SOLUTION ORAL at 08:07

## 2025-07-07 RX ADMIN — LEVALBUTEROL HYDROCHLORIDE 1.25 MG: 0.63 SOLUTION RESPIRATORY (INHALATION) at 12:07

## 2025-07-07 RX ADMIN — FLUTICASONE PROPIONATE 100 MCG: 50 SPRAY, METERED NASAL at 08:07

## 2025-07-07 RX ADMIN — LORAZEPAM 1 MG: 2 INJECTION INTRAMUSCULAR; INTRAVENOUS at 06:07

## 2025-07-07 RX ADMIN — LEVALBUTEROL HYDROCHLORIDE 1.25 MG: 0.63 SOLUTION RESPIRATORY (INHALATION) at 06:07

## 2025-07-07 RX ADMIN — AMITRIPTYLINE HYDROCHLORIDE 25 MG: 25 TABLET, FILM COATED ORAL at 08:07

## 2025-07-07 RX ADMIN — BUDESONIDE 0.5 MG: 0.5 SUSPENSION RESPIRATORY (INHALATION) at 06:07

## 2025-07-07 RX ADMIN — AMIODARONE HYDROCHLORIDE 200 MG: 200 TABLET ORAL at 08:07

## 2025-07-07 RX ADMIN — LEVETIRACETAM 500 MG: 500 TABLET, FILM COATED ORAL at 08:07

## 2025-07-07 RX ADMIN — MORPHINE SULFATE 2 MG: 2 INJECTION, SOLUTION INTRAMUSCULAR; INTRAVENOUS at 11:07

## 2025-07-07 RX ADMIN — Medication: at 08:07

## 2025-07-07 NOTE — CARE UPDATE
07/06/25 1854   Patient Assessment/Suction   Level of Consciousness (AVPU) alert   Respiratory Effort Unlabored   Expansion/Accessory Muscles/Retractions no use of accessory muscles;no retractions;expansion symmetric   All Lung Fields Breath Sounds diminished   Rhythm/Pattern, Respiratory unlabored;pattern regular;depth regular   Cough Frequency infrequent   Cough Type fair;nonproductive;weak   PRE-TX-O2   Device (Oxygen Therapy) high flow nasal cannula   $ Is the patient on High Flow Oxygen? Yes   Flow (L/min) (Oxygen Therapy) 5  (pt found on 5 liters, pt comfortable at this time)   SpO2 98 %   Pulse Oximetry Type Intermittent   $ Pulse Oximetry - Multiple Charge Pulse Oximetry - Multiple   Pulse 107   Resp (!) 23   Aerosol Therapy   $ Aerosol Therapy Charges Aerosol Treatment   Respiratory Treatment Status (SVN) given   Treatment Route (SVN) air;mask   Patient Position HOB elevated   Post Treatment Assessment (SVN) increased aeration   Signs of Intolerance (SVN) none   Breath Sounds Post-Respiratory Treatment   Throughout All Fields Post-Treatment All Fields   Throughout All Fields Post-Treatment aeration increased   Post-treatment Heart Rate (beats/min) 114   Post-treatment Resp Rate (breaths/min) 23   General Safety Checklist   Safety Promotion/Fall Prevention side rails raised   Airway Safety   Is Ambu Bag and Mask with Patient? Yes, Adult Ambu Bag and Mask   Suction set is at the bedside? Yes   Respiratory Interventions   Cough And Deep Breathing done independently per patient   Airway/Ventilation Management calming measures promoted;humidification applied;pulmonary hygiene promoted   Airway/Ventilation Support comfort measures provided;cough relief provided;dyspnea relief promoted;humidification applied;pulmonary hygiene promoted   Breathing Techniques/Airway Clearance deep/controlled cough encouraged   Preset CPAP/BiPAP Settings   Mode Of Delivery home unit;standby   CPAP/BIPAP charged w/in last 24 h NO    Patient CPAP/BiPAP Settings   CPAP/BIPAP ID home unit   Education   $ Education Bronchodilator;Oxygen   Respiratory Evaluation   $ Care Plan Tech Time 15 min   Oxygen Care Plan   Oxygen Care Plan Per Protocol   SPO2 Goal (%) 92% non-cardiac   Rationale SpO2 is <MD Goal   Bronchodilator Care Plan   Bronchodilator Care Plan Aerosol   Aerosol Meds w/ frequency Xopenex(Levalbuterol HCL) 1.25mg Q 6Hr;Pulmicort(Budenoside) 0.5mg Q 12Hr   Rationale Shortness of breath (increased WOB)

## 2025-07-07 NOTE — CARE UPDATE
07/06/25 1902   Patient Assessment/Suction   Level of Consciousness (AVPU) alert   Respiratory Effort Unlabored   Expansion/Accessory Muscles/Retractions no use of accessory muscles;no retractions;expansion symmetric   All Lung Fields Breath Sounds diminished   Rhythm/Pattern, Respiratory unlabored;pattern regular;depth regular   Cough Frequency infrequent;no cough   PRE-TX-O2   Device (Oxygen Therapy) high flow nasal cannula   Flow (L/min) (Oxygen Therapy) 5   SpO2 99 %   Pulse Oximetry Type Intermittent   Pulse (!) 117   Resp (!) 24   Aerosol Therapy   $ Aerosol Therapy Charges Aerosol Treatment   Respiratory Treatment Status (SVN) given   Treatment Route (SVN) air;mask   Patient Position HOB elevated   Post Treatment Assessment (SVN) increased aeration   Signs of Intolerance (SVN) none   Breath Sounds Post-Respiratory Treatment   Throughout All Fields Post-Treatment All Fields   Throughout All Fields Post-Treatment aeration increased   Post-treatment Heart Rate (beats/min) 118   Post-treatment Resp Rate (breaths/min) 24

## 2025-07-07 NOTE — RESPIRATORY THERAPY
Pt rested comfortable on a 5 liter high flow nasal cannula, pt did not want to wear his cpap last night.

## 2025-07-07 NOTE — NURSING
RN attempted to assess patient's sacrum & buttocks, patient sleeping, spoke to SN Pretty Villa RN, she stated she cleaned patient, applied Triad, RN with WC will attempt photographs on 7/8/25.

## 2025-07-07 NOTE — PROGRESS NOTES
[facility] Medicine   Progress Note  Room: 207/207   Patient Name: Jordin Allen Jr.  MRN: [id number]  Admit Date: 6/3/2025   Length of Stay:  LOS: 34 days   Attending Physician: Bernardino Guthrie MD  Nurse Practitioner: Ernestine Palomino NP    Date of Service: 07/07/2025    Principal Problem:    Acute on chronic respiratory failure with hypoxia    Brief HPI:     Jordin Allen Jr. is a 79 y.o. 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  He [place] [facility] [place] with complaints of shortness of breath and left foot redness.  Admitted to hospital medicine team for left foot cellulitis and acute hypoxic respiratory failure secondary to pneumonia/COPD exacerbation. CTA chest negative for PE. Emphysematous change with superimposed edema. Nodular focus within the anterior aspect of left upper lobe.  Started on nebs, prednisone, and empiric vanc/cefepime and doxycycline.  Respiratory panel positive for parainfluenza virus.  Course further complicated by sinus tach with PACs/18, for which he was started on diltiazem.  Blood cultures positive for staph, suspected to be contaminant.  Repeat blood cultures returned negative.  He will switch to Augmentin and doxy.  Ongoing episodes of SVT, thought to be due to underlying hypoxia.  He was able to be weaned down to high-flow nasal cannula 15 L.  Discharged to [facility] on 06/03/2025 for rehab, wound care, and O2 weaning.     6/9-6/11-sent out to [facility] for concerns of [place]. Workup revealed findings concerning for pneumonia on CT. Started on empiric vanc and zosyn. Also started on 5 day course of prednisone for COPD exacerbation.    6/18 - 6/22-sent out to [facility] again for increasing O2 requirements and worsening AFib with RVR on diltiazem drip.  Use switch to amiodarone drip at [facility],  and transitioned to p.o. amiodarone successfully.  Infectious workup revealed concerns for aspiration pneumonia.  He was treated with vanc, cefepime, and levofloxacin.  Antibiotics de-escalated to oral levofloxacin.  He was weaned from BiPAP to nasal cannula at 3-5 L.      Interval History    Received dose of p.r.n. Ativan this morning   Currently on 4 L nasal cannula   Appears comfortable at time of exam         Subjective:     Review of Systems   Constitutional:  Positive for malaise/fatigue.   Respiratory:  Negative for cough, sputum production and shortness of breath.    Cardiovascular:  Negative for chest pain and leg swelling.   Gastrointestinal:  Negative for heartburn, nausea and vomiting.   Genitourinary:  Negative for dysuria and urgency.   Musculoskeletal:  Positive for joint pain (R shoulder). Negative for myalgias.   Neurological:  Positive for weakness. Negative for dizziness.   Psychiatric/Behavioral:  Negative for depression. The patient does not have insomnia.          Objective:     Vital Sign Range  Temp:  [97.7 °F (36.5 °C)-98.3 °F (36.8 °C)]   Pulse:  []   Resp:  [16-26]   BP: ()/(56-78)   SpO2:  [92 %-100 %]     Body mass index is 27.07 kg/m².           Intake/Output Summary (Last 24 hours) at 7/7/2025 0937  Last data filed at 7/6/2025 1818  Gross per 24 hour   Intake 297 ml   Output --   Net 297 ml       Physical Exam  Constitutional:       Appearance: He is ill-appearing.   HENT:      Head: Normocephalic and atraumatic.      Mouth/Throat:      Mouth: Mucous membranes are moist.      Pharynx: Oropharynx is clear.   Eyes:      Extraocular Movements: Extraocular movements intact.      Pupils: Pupils are equal, round, and reactive to light.   Cardiovascular:      Rate and Rhythm: Normal rate. Rhythm irregular.   Pulmonary:      Breath sounds: Wheezing and rhonchi present.   Abdominal:      General: Bowel sounds are normal.      Palpations: Abdomen is soft.   Musculoskeletal:       Right lower leg: Edema present.      Left lower leg: Edema present.   Skin:     General: Skin is warm and dry.   Neurological:      Mental Status: He is alert and oriented to person, place, and time. Mental status is at baseline.   Psychiatric:         Mood and Affect: Affect is flat.         Wounds       Wound 05/19/25 0233 Moisture associated dermatitis Left dorsal Foot (Active)   05/19/25 0233 Foot   Present on Original Admission: Y   Primary Wound Type: Moisture ass   Side: Left   Orientation: dorsal        Wound 05/19/25 0231 Moisture associated dermatitis Left lateral Foot (Active)   05/19/25 0231 Foot   Present on Original Admission: Y   Primary Wound Type: Moisture ass   Side: Left   Orientation: lateral        Wound 05/19/25 0231 Pressure Injury Right anterior Ankle (Active)   05/19/25 0231 Ankle   Present on Original Admission: Y   Primary Wound Type: Pressure inj   Side: Right   Orientation: anterior        Wound 05/19/25 0233 Moisture associated dermatitis Left anterior Foot (Active)   05/19/25 0233 Foot   Present on Original Admission:    Primary Wound Type: Moisture ass   Side: Left   Orientation: anterior        Wound 06/04/25 1030 Moisture associated dermatitis Right lateral Abdomen (Active)   06/04/25 1030 Abdomen   Present on Original Admission: Y   Primary Wound Type: Moisture ass   Side: Right   Orientation: lateral        Wound 06/04/25 1030 Moisture associated dermatitis Left lateral Abdomen (Active)   06/04/25 1030 Abdomen   Present on Original Admission: Y   Primary Wound Type: Moisture ass   Side: Left   Orientation: lateral        Wound 06/04/25 1030 Moisture associated dermatitis Right Groin (Active)   06/04/25 1030 Groin   Present on Original Admission: Y   Primary Wound Type: Moisture ass   Side: Right        Wound 06/04/25 1030 Moisture associated dermatitis Left Groin (Active)   06/04/25 1030 Groin   Present on Original Admission: Y   Primary Wound Type: Moisture ass   Side: Left        " Wound 06/04/25 1030 Moisture associated dermatitis Sacral spine (Active)   06/04/25 1030 Sacral spine   Present on Original Admission: Y   Primary Wound Type: Moisture ass         Wounds consistently followed by [facility] NP Sheree Tao     Labs:  Recent Labs   Lab 07/02/25  0428   WBC 10.06   HGB 8.8*   HCT 27.4*   *     Recent Labs   Lab 07/02/25  0428      K 4.1      CO2 27   BUN 24*   CREATININE 0.7   GLU 75   CALCIUM 8.6*   MG 2.1   PHOS 2.6*     Recent Labs   Lab 07/02/25  0428   ALKPHOS 88   ALT 26   AST 21   ALBUMIN 1.7*   PROT 5.1*   BILITOT 0.8       No results for input(s): "POCTGLUCOSE" in the last 72 hours.      Meds Scheduled:   amiodarone  200 mg Oral Daily    amitriptyline  25 mg Oral QHS    ammonium lactate   Topical (Top) Daily    budesonide  0.5 mg Nebulization Q12H    cetirizine  10 mg Oral Daily    fluticasone propionate  2 spray Each Nostril Daily    guaiFENesin  1,200 mg Oral BID    levalbuterol  1.2495 mg Nebulization Q6H    levETIRAcetam  500 mg Oral BID    polyethylene glycol  17 g Oral BID    roflumilast  500 mcg Oral Daily       Current Inpatient Problem List:  Active Hospital Problems    Diagnosis  POA    *Acute on chronic respiratory failure with hypoxia [J96.21]  Yes    Comfort measures only status [Z51.5]  Not Applicable    Parainfluenza virus infection [B34.8]  Yes    Pneumonia of right lung due to infectious organism [J18.9]  Yes    Cellulitis [L03.90]  Yes    TIA (transient ischemic attack) [G45.9]  Yes     ASA and statin      Metastasis to brain [C79.31]  Yes    Adenocarcinoma, lung, 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       CAD (coronary artery disease) [I25.10]  Yes     Chronic     -[facility] (10/31/13) for stemi: pLAD 100%, Cx with Li's, mRCA 40%, LVEF 55%,. LVEDP 15   -Xience 3.0 x 33 mm to pLAD, " post dilated with 3.5 NC and 4.0 NC.   -TTE (8/14/13): LVEF 55%, grade I diastolic dysfunction      HTN (hypertension), benign [I10]  Yes    SHOLA (obstructive sleep apnea) [G47.33]  Yes     CPAP at night      GERD (gastroesophageal reflux disease) [K21.9]  Yes     Continue PPI        Resolved Hospital Problems   No resolved problems to display.         Assessment / Plan:     Acute respiratory failure with hypoxia  COPD with acute exacerbation  Parainfluenza virus infection  Pneumonia   On Symbicort Spiriva and Daliresp at home. Follows up with pulmonology outpatient.   Completed 7 day course of Augmentin and doxycycline on 06/01  Continue Daliresp 500mcg daily  Continue Xopenex p.r.n.   Continue weaning high-flow nasal cannula  Continue guaifenesin 1200 mg b.i.d.  Completed 7 day course of levofloxacin on 6/25  Pulmonology consulted, appreciate recommendations  On 4L nasal cannula this morning   Will need to continue weaning O2, reaching out to inpatient hospice to see if O2 can be weaned there    Comfort measures only   7/2Discussion held again today at bedside with Mr. Allen and daughter on phone. We again discussed goals of care, and Mr. Allen stated he is tired, and just wants to be comfortable. He would like to transition to hospice. We discussed discontinuing non-comfort focused medications as well as labs and monitoring, which he is in agreement with. We also discussed adding morphine for air hunger and ativan for anxiety. He's requesting a dose of morphine now.  Adjusting orders to reflect comfort measures. Can d/c labs and cardiac monitoring. Morphine and Ativan ordered prn.  Received p.r.n. morphine and Ativan overnight for air hunger and anxiety   Ativan and morphine seem to be working well   Continue comfort care measures    Constipation, new   Last Bowel Movement: 07/07/25  Continue MiraLax b.i.d.   Continue suppository p.r.n.      Thrombocytopenia, new   Aspirin and mirtazapine discontinued on  06/27  Platelets improved to 125   Discontinued labs as patient is now on comfort measures only      Chest pain, new   Resolved   Evaluated by cardiology while in the hospital  Will need outpatient follow up with Dr. Ba with Cardiology     Cellulitis   Wound followed and managed by consistent, contracted [facility] NP  Completed 7 day course of doxy and Augmentin     Restless leg syndrome   Continue amitriptyline 25 mg nightly     Adenocarcinoma, lung, left   Metastasis to brain  Completed treatment with chemo/XRT. Brain XRT for lesionsx2. off immunotherapy since 12/24  suspect the LLL area was consolidation of prior RXT changes and not malignancy and is nearly resolved.  CT on presentation with new SANJAY nodule, need op follow up with pulm  Continue prophylactic Keppra 500 mg b.i.d.    Right shoulder pain  Having some shoulder pain to his right shoulder, which seems to be some muscle spasms.    Continue Robaxin 500 mg q.i.d. p.r.n. on 6/6    Compression fracture of L1 vertebrae with routine healing   TLSO brace when out of bed     Dyslipidemia  TIA  CAD   Will discontinue aspirin and atorvastatin, transitioning to comfort measures     Tachycardia   Episodes of SVT while at the hospital   Evaluated by Cardiology   Likely worsened by underlying hypoxia   Supplemental O2   Uncontrolled AFib with RVR on 06/16, he was placed on diltiazem drip though rate remained difficult to control  Sent back to [facility] and ultimately started on amiodarone for rate control   Continue amiodarone 400 mg b.i.d. for 8 days, then transition to amiodarone 200 mg daily  Plan to continue amiodarone for comfort   Can discontinue cardiac monitoring       SHOLA   Continue CPAP nightly     GERD  Continue PPI daily     Left lateral foot wound   Right anterior ankle wound   Traumatic left dorsal foot wound   Left anterior foot venous ulcer   Skin tear right distal arm   Pressure injury nose  Wound followed and managed by consistent, contracted Wound  "Poornima NP    ACP, 07/02/2025   This was a voluntary visit and the option to decline counseling was given  Length of discussion:  20 minutes  Life limiting problem:  Hypoxemia  Topics discussed:  Goals of care  Outcome of discussion:Mr. Allen stated he is tired, and just wants to be comfortable. He would like to transition to hospice. We discussed discontinuing non-comfort focused medications as well as labs and monitoring, which he is in agreement with. We also discussed adding morphine for air hunger and ativan for anxiety. He's requesting a dose of morphine now.  Adjusting orders to reflect comfort measures. Can d/c labs and cardiac monitoring. Morphine and Ativan ordered prn.  Decision Maker:Jordin Allen      ACP, 7/1/25  This was a voluntary visit and the option to decline counseling was given  Length of discussion: 16 minutes  Life limiting problem:  Respiratory failure  Topics discussed:  Hospice  Outcome of discussion: He states that he understands that he is likely at the end of life, and he "just wants to be comfortable". He would like to include his daughter on further discussions regarding hospice. Discussed with attending MD, may also be able to reach out to palliative care to see if patient could be transferred to HPU at [facility] [place] [place]. Giving dose of morphine 2mg for air hunger, and will reach out to palliative care today for further assistance.  Decision Maker: Jordin Allen          ACP 6/19  "Dr. Zaldivar and I held discussion with the patient regarding his goals of care. The patient states that he would want to be intubated for mechanical ventilation if he were to decompensate but in the event of a cardiac arrest, he would not want CPR and would want to be allowed to die peacefully." Tommy Argueta NP  Code status changed to DNR     Psychiatric Assessment  Patient Health Questionnaire-9 (PHQ-9)    Date:  06/05/2025  Total Score: 15  Screening is Positive   Diagnosis and Treatment Plan:  Moderate MDD "   Continue amitriptyline 25 mg nightly   Increasing mirtazapine to 15 mg nightly, which will hopefully help with his appetite as well       Diet:  Cardiac, PRN as needed tray currently  GI Ppx:  PPI   DVT Ppx:  Enoxaparin     Lines:  PIV   Drains:  None   Airways:n/a   Wounds:  Multiple    Goals of Care:   Restorative,  Treat infection  Optimize nutrition  Wound healing  Muscle strengthening  Restoration of ADL's  Improve mobility    Anticipated Disposition:    TBD    Follow up appointments:   Future Appointments   Date Time Provider Department Center   7/8/2025  9:15 AM MEEKS, VISUAL FIELDS [facility] OPHTHAL [facility]   7/8/2025 10:00 AM Cara Looney MD [facility] OPHTHAL [facility]   7/15/2025 10:00 AM Garrett Lloyd DPM [facility] POD [facility]   7/23/2025 10:15 AM CV [facility] ECHO [facility] CARDIA [facility]   7/28/2025  1:30 PM [facility] OIC-MRI4 [facility] MRI IC [facility]   7/29/2025 10:15 AM Steven Campos MD [facility] NEUROSC [facility]   7/29/2025 10:30 AM Bienvenido Henson MD [facility] RADONC3 [facility]   8/15/2025  9:20 AM Bienvenido Wrad MD [facility] PULMON [facility]   8/27/2025  9:00 AM Guido Trejo MD [facility] ENT [facility]   9/11/2025  8:30 AM Yan Palmer MD [facility] DERM [facility]         Ernestine Palomino, NP  Hospital Medicine [facility]    I have spent 54 minutes reviewing patient records (not including ACP time), examining, and  of the patient/ patient's family with greater than 50% of the time spent with direct patient care and coordination.

## 2025-07-07 NOTE — PLAN OF CARE
Pt comfortable throughout the day. Able to drink small amount of fluids. Refer to flowsheet for assessment.     Problem: Adult Inpatient Plan of Care  Goal: Optimal Comfort and Wellbeing  Outcome: Progressing

## 2025-07-07 NOTE — TELEPHONE ENCOUNTER
Attempted to contact patient in regards to his appointment scheduled on 07/15/2025 w/Dr. Lloyd(Podiatry), unable to reach patient, mailbox is full, will send cancellation portal message

## 2025-07-08 PROCEDURE — 25000242 PHARM REV CODE 250 ALT 637 W/ HCPCS

## 2025-07-08 PROCEDURE — 94761 N-INVAS EAR/PLS OXIMETRY MLT: CPT

## 2025-07-08 PROCEDURE — 27100171 HC OXYGEN HIGH FLOW UP TO 24 HOURS

## 2025-07-08 PROCEDURE — 25000003 PHARM REV CODE 250: Performed by: STUDENT IN AN ORGANIZED HEALTH CARE EDUCATION/TRAINING PROGRAM

## 2025-07-08 PROCEDURE — 27000221 HC OXYGEN, UP TO 24 HOURS

## 2025-07-08 PROCEDURE — 94760 N-INVAS EAR/PLS OXIMETRY 1: CPT

## 2025-07-08 PROCEDURE — 63600175 PHARM REV CODE 636 W HCPCS: Performed by: STUDENT IN AN ORGANIZED HEALTH CARE EDUCATION/TRAINING PROGRAM

## 2025-07-08 PROCEDURE — 99900035 HC TECH TIME PER 15 MIN (STAT)

## 2025-07-08 PROCEDURE — 11000001 HC ACUTE MED/SURG PRIVATE ROOM

## 2025-07-08 PROCEDURE — 25000003 PHARM REV CODE 250

## 2025-07-08 PROCEDURE — 94640 AIRWAY INHALATION TREATMENT: CPT

## 2025-07-08 RX ADMIN — LEVETIRACETAM 500 MG: 500 TABLET, FILM COATED ORAL at 08:07

## 2025-07-08 RX ADMIN — OXYCODONE HYDROCHLORIDE 5 MG: 5 TABLET ORAL at 08:07

## 2025-07-08 RX ADMIN — MELATONIN TAB 3 MG 6 MG: 3 TAB at 08:07

## 2025-07-08 RX ADMIN — MORPHINE SULFATE 2 MG: 2 INJECTION, SOLUTION INTRAMUSCULAR; INTRAVENOUS at 10:07

## 2025-07-08 RX ADMIN — FLUTICASONE PROPIONATE 100 MCG: 50 SPRAY, METERED NASAL at 08:07

## 2025-07-08 RX ADMIN — Medication: at 08:07

## 2025-07-08 RX ADMIN — AMITRIPTYLINE HYDROCHLORIDE 25 MG: 25 TABLET, FILM COATED ORAL at 08:07

## 2025-07-08 RX ADMIN — LEVALBUTEROL HYDROCHLORIDE 1.26 MG: 0.63 SOLUTION RESPIRATORY (INHALATION) at 09:07

## 2025-07-08 RX ADMIN — HYDROXYZINE HYDROCHLORIDE 25 MG: 25 TABLET, FILM COATED ORAL at 08:07

## 2025-07-08 NOTE — PLAN OF CARE
..[facility]  Interdisciplinary Team Conference        Jordin Allen Jr.    Conference Date: 7/8/2025  Admit Date: 6/3/2025       Active Hospital Problems    Diagnosis    *Acute on chronic respiratory failure with hypoxia    Comfort measures only status    Parainfluenza virus infection    Pneumonia of right lung due to infectious organism    Cellulitis    TIA (transient ischemic attack)     ASA and statin      Metastasis to brain    Adenocarcinoma, lung, left    Dyslipidemia    COPD with acute exacerbation     Taking symbicort and spiriva and daliresp  Peak flow 270 l/min In April his Fev-1: 1.33 liters 47%.       CAD (coronary artery disease)     -[facility] (10/31/13) for stemi: pLAD 100%, Cx with Li's, mRCA 40%, LVEF 55%,. LVEDP 15   -Xience 3.0 x 33 mm to pLAD, post dilated with 3.5 NC and 4.0 NC.   -TTE (8/14/13): LVEF 55%, grade I diastolic dysfunction      HTN (hypertension), benign    SHOLA (obstructive sleep apnea)     CPAP at night      GERD (gastroesophageal reflux disease)     Continue PPI          Code Status: DNR  Advance Directive  (If Adv Dir status is received, view document under Adv Dir in header or Chart Review Media tab): Patient does not have Advance Directive, declines information.        Power of /Surrogate Decision Maker: N/A    LAB/TEST/TREATMENTS:    POCT Glucose   Date Value Ref Range Status   06/08/2025 145 (H) 70 - 110 mg/dL Final   05/16/2025 98 70 - 110 mg/dL Final   05/16/2025 83 70 - 110 mg/dL Final      Lab Results   Component Value Date    INR 1.0 02/07/2025    INR 1.0 02/07/2025    INR 1.0 02/07/2025     Lab Results   Component Value Date    BUN 24 (H) 07/02/2025    BUN 23 06/30/2025    BUN 20 06/27/2025     Lab Results   Component Value Date    PH 7.471 (H) 06/18/2025    PCO2 38.7 06/18/2025    PO2 32.9 (L) 06/18/2025    HCO3 27.7 06/18/2025     Lab Results   Component Value Date    WBC 10.06 07/02/2025    WBC 11.06 06/30/2025    WBC 9.18 06/28/2025      Susceptibility data from last 90 days.  Collected Specimen Info Organism Amp/Sulbactam Ampicillin Cefazolin CEFEPIME ETEST Ceftriaxone Ciprofloxacin Ertapenem Gentamicin LEVOFLOXACIN ETEST Meropenem Pip/Tazo Tobramycin Trimeth/Sulfa   05/27/25 Respiratory from Sputum Candida albicans                05/25/25 Respiratory from Sputum, Expectorated Candida albicans                05/24/25 Respiratory from Sputum, Expectorated Candida albicans                05/22/25 Blood from Peripheral, Hand, Right Coagulase-negative Staphylococcus species                05/05/25 Abscess from Groin Escherichia coli  R  R  R  S  S  S  S  S  S  S  S  S  R   05/05/25 Abscess from Groin Bacteroides ovatus                         Dialysis: N/A    Provider Comments/Recommendations: comfort measures, would like to go hospice but not at home, receiving morphine and ativan, possibly may meet criteria for IPH    DIAGNOSTIC TEST    Radiology (Last 168 hours)           None             X-Ray Abdomen AP 1 View  EXAMINATION:  XR ABDOMEN AP 1 VIEW    CLINICAL HISTORY:  constipation, abd pain;    FINDINGS:  Abdomen one view:    There is cardiomegaly.  There are small pleural effusions.  There is bibasal edema.  No obstruction, ileus, or perforation seen.  There is no significant constipation.  No acute process seen.    Electronically signed by: Alexander Blankenship MD  Date:    06/27/2025  Time:    12:25       NURSING:    Cognitive/Neuro/Behavioral WDL: WDL except, mood/behavior, motor response, orientation, speech  Level of Consciousness (AVPU): alert  Arousal Level: opens eyes spontaneously  Orientation: oriented x 4, person, place  Speech: clear/fluent, slurred/dysarthric, other (see comments)    Fall Risk Score: 24  History Of Fall (W/I 3 Mos): Y  Fall this admission: no    Restraints:         Infections:  No active infections  No active isolations      Telemetry: yes     Patient currently receiving pharmacological/non pharmacological interventions  for pain: Yes     Urinary Incontinence: Yes  Bowel Incontinence: Yes         Midline Catheter - Single Lumen 06/14/25 1245 Left basilic vein (medial side of arm) other (see comments) (Active)   $ Midline Charges (Upon insertion) Midline Catheter (Supply) 07/07/25 0730   Site Assessment Clean;Dry;Intact;No redness;No swelling 07/07/25 0730   IV Device Securement catheter securement device 07/07/25 0730   Line Status Capped;Flushed;Saline locked 07/07/25 0730   Dressing Type CHG impregnated dressing/sponge;Central line dressing;Transparent (Tegaderm) 07/07/25 0730   Dressing Status Clean;Dry;Intact 07/07/25 0730   Dressing Intervention Integrity maintained 07/07/25 0730   Dressing Change Due 07/12/25 07/07/25 0730   Site Change Due 07/12/25 07/06/25 1901   Reason Not Rotated Not due 07/06/25 1901   Number of days: 23       Comments/Recommendations:  bm 7/7, midline, comfort care, not eating, very tired    Wound Care:             Wound 06/04/25 1030 Moisture associated dermatitis Sacral spine (Active)   06/04/25 1030 Sacral spine   Present on Original Admission: Y   Primary Wound Type: Moisture ass   Side:    Orientation:    Wound Approximate Age at First Assessment (Weeks):    Wound Number:    Is this injury device related?:    Incision Type:    Closure Method:    Wound Description (Comments):    Type:    Additional Comments:    Ankle-Brachial Index:    Pulses:    Removal Indication and Assessment:    Wound Outcome:    Wound Image    06/30/25 1015   Dressing Appearance Open to air 07/07/25 1901   Drainage Amount None 07/07/25 0730   Drainage Characteristics/Odor Serosanguineous 06/30/25 1015   Appearance Pink;Maroon;Red 07/07/25 0730   Tissue loss description Partial thickness 06/22/25 0800   Red (%), Wound Tissue Color 100 % 06/30/25 1015   Periwound [place] 07/07/25 0730   Wound Edges Approximated 07/07/25 0730   Care Cleansed with:;Soap and water;Applied:;Skin Barrier 07/07/25 0730   Dressing Applied;Other  (comment) 06/29/25 1500   Periwound Care Moisture barrier applied 07/07/25 0730   Number of days: 34        Anjum Score: 10     Skin Interventions: Device Skin Pressure Protection: absorbent pad utilized/changed  Pressure Reduction Devices: positioning supports utilized  Pressure Reduction Techniques: weight shift assistance provided     Comments/Recommendations:  masd sacrum and buttocks looking well    Respiratory:       Flow (L/min) (Oxygen Therapy): 8  Oxygen Concentration (%): 40          Vent Weaning: N/A    Comments/Recommendations:  NC 8 L/minute, PRN tx, receiving prn treatments at least 1x shift    Nutrition:    Dietary Orders (From admission, onward)       Start     Ordered    07/06/25 0830  Diet Adult Regular  Diet effective now        Comments: Please don't send julito unless call and requested! Pt Is not eating at this time    07/06/25 0830    06/24/25 1715  Dietary nutrition supplements Boost Plus Nutritional Drink - Any flavor  All Meals      Comments: CHOCOLATE   Question Answer Comment   Route: By Mouth    Select PO Supplement: Boost Plus Nutritional Drink - Any flavor        06/24/25 1339                     Parenteral Nutrition: N/A    Wt Readings from Last 1 Encounters:   07/08/25 0559 82.5 kg (181 lb 14.1 oz)   07/07/25 0800 83.2 kg (183 lb 6.8 oz)   07/07/25 0600 83.2 kg (183 lb 6.8 oz)   07/06/25 0426 83.5 kg (184 lb 1.4 oz)   07/05/25 0600 85.5 kg (188 lb 7.9 oz)   07/04/25 0600 87.5 kg (192 lb 14.4 oz)   07/03/25 0645 86.4 kg (190 lb 7.6 oz)   07/02/25 0614 85.1 kg (187 lb 9.8 oz)   07/01/25 0600 84.9 kg (187 lb 2.7 oz)   06/30/25 0600 84.2 kg (185 lb 10 oz)   06/29/25 0427 83.8 kg (184 lb 11.9 oz)   06/28/25 0300 88.9 kg (195 lb 15.8 oz)   06/27/25 0545 89.6 kg (197 lb 8.5 oz)   06/26/25 0600 86.6 kg (190 lb 14.7 oz)   06/25/25 0600 88.4 kg (194 lb 14.2 oz)   06/24/25 0600 88 kg (194 lb 0.1 oz)   06/23/25 0546 88.2 kg (194 lb 7.1 oz)   06/22/25 1510 88.1 kg (194 lb 3.6 oz)   06/18/25 0400  87.4 kg (192 lb 10.9 oz)   06/17/25 0600 92.2 kg (203 lb 4.2 oz)   06/16/25 0401 92.6 kg (204 lb 2.3 oz)   06/15/25 0400 89.6 kg (197 lb 8.5 oz)   06/14/25 0800 87.9 kg (193 lb 12.6 oz)   06/14/25 0424 87.9 kg (193 lb 12.6 oz)   06/13/25 0800 88 kg (194 lb 0.1 oz)   06/13/25 0600 88 kg (194 lb 0.1 oz)   06/12/25 0558 87.4 kg (192 lb 10.9 oz)   06/09/25 0800 86.3 kg (190 lb 4.1 oz)   06/09/25 0615 87.9 kg (193 lb 12.6 oz)   06/08/25 0444 90.4 kg (199 lb 4.7 oz)   06/05/25 1032 96 kg (211 lb 10.3 oz)   06/05/25 0600 96 kg (211 lb 10.3 oz)   06/04/25 0800 96 kg (211 lb 10.3 oz)   06/03/25 1629 96 kg (211 lb 10.3 oz)   06/03/25 1555 86.8 kg (191 lb 5.8 oz)       Comments/Recommendations: reg diet not eating, wt 181 lbs    Pharmacy:        amiodarone  200 mg Oral Daily    amitriptyline  25 mg Oral QHS    ammonium lactate   Topical (Top) Daily    fluticasone propionate  2 spray Each Nostril Daily    levETIRAcetam  500 mg Oral BID    polyethylene glycol  17 g Oral BID      Antibiotics (From admission, onward)      Start     Stop Route Frequency Ordered    06/12/25 0945  piperacillin-tazobactam 4.5 g in sodium chloride 0.9% 100 mL IVPB (ready to mix)         06/19/25 2353 IV Every 8 hours 06/12/25 0930    06/04/25 2100  mupirocin 2 % ointment         06/09/25 2059 Nasl 2 times daily 06/04/25 1722           Antivirals (From admission, onward)      None             Current Facility-Administered Medications:     acetaminophen, 650 mg, Oral, Q8H PRN    benzonatate, 100 mg, Oral, TID PRN    bisacodyL, 10 mg, Rectal, Daily PRN    budesonide, 0.5 mg, Nebulization, Q6H PRN    calcium carbonate, 500 mg, Oral, TID PRN    hydrOXYzine HCL, 25 mg, Oral, TID PRN    levalbuterol, 1.26 mg, Nebulization, Q6H PRN    lorazepam, 1 mg, Intravenous, Q4H PRN    melatonin, 6 mg, Oral, Nightly PRN    methocarbamoL, 500 mg, Oral, TID PRN    morphine, 2 mg, Intravenous, Q4H PRN    naloxone, 0.02 mg, Intravenous, PRN    nitroGLYCERIN, 0.4 mg,  Sublingual, Q5 Min PRN    ondansetron, 4 mg, Oral, Q6H PRN    oxyCODONE, 5 mg, Oral, Q4H PRN    sodium chloride 0.9%, 10 mL, Intravenous, Q12H PRN     Comments/Recommendations: no abx or issues    Physical Therapy:    Multidisciplinary Problems       Physical Therapy Goals          Problem: Physical Therapy    Goal Priority Disciplines Outcome Interventions   Physical Therapy Goal     PT, PT/OT Progressing    Description: Goals to be met by: DC     Patient will increase functional independence with mobility by performin. Supine to sit with [place]  2. Sit to supine with [place]  3. Sit to stand transfer with Supervision with RW  4. Gait  x 150 feet with Contact Guard Assistance using Rolling Walker.                            Comments/Recommendations:  NA    Occupational Therapy:    Multidisciplinary Problems       Occupational Therapy Goals          Problem: Occupational Therapy    Goal Priority Disciplines Outcome Interventions   Occupational Therapy Goal     OT, PT/OT Progressing    Description: Goals to be met by: 25  Patient will increase functional independence with ADLs by performing:    UE Dressing with Set-up Assistance. Discontinued  LE Dressing with SBA using appropriate AE as needed. Discontinued  Grooming while seated at sink with Set-up Assistance. Discontinued  Toileting from 3-in-1 commode over toilet with min A for hygiene and clothing management. Discontinued  Supine to sit with SPV . Discontinued  Step transfer with min A with RW. Discontinued  Toilet transfer to 3-in-1 commode over toilet with min A with RW. Discontinued    Revised goals as of 25  Pt will tolerate bed level ADLs, such as grooming and oral hygiene with minimal cueing for pursed lip breathing.   Pt will tolerate bed level active and passive range of motion program to BUE to maintain joint integrity, prevent stiffness, and decrease pain.  Pt and family will be educated in optimal positioning and demo teach  back understanding to prevent deformity and maintain joint integrity   Pt will be educated in coping and stress management strategies and demo use of those strategies during functional tasks.                          Comments/Recommendations:  N/A    Speech Therapy:    Multidisciplinary Problems       SLP Goals       Not on file                     Comments/Recommendations:  N/A    Discharge Planning:    Prior to hospitilization cognitive status:: Alert/Oriented      People in Home: child(selene), adult, other relative(s)           Discharge Plan A: Rehab, Skilled Nursing Facility   Discharge Plan B: Home with family, Home Health, New Nursing Home placement - long-term care facility   DME Needed Upon Discharge : other (see comments) (TBD)        Future Appointments   Date Time Provider Department Center   7/8/2025 10:00 AM Cara Looney MD [facility] OPHTHAL [address]   7/23/2025 10:15 AM CV [facility] ECHO [facility]IA [place]   7/28/2025  1:30 PM [facility] [id number] [facility] MRI [facility]   7/29/2025 10:15 AM Steven Campos MD [facility] NEUROSC [address]   7/29/2025 10:30 AM Bienvenido Henson MD [facility] [id number] [place]   8/15/2025  9:20 AM Bienvenido Ward MD [facility] PULMON [place]   8/27/2025  9:00 AM Guido Trejo MD [facility] ENT [address]   9/11/2025  8:30 AM Yan Palmer MD [facility] DERM [address]         Expected Discharge Date: 7/14/2025     Comments/Recommendations:  7/14 comfort care to [facility], referrals sent out last wk to [facility] - no response at this time, daughter updated on care plan via email, all questions and concerns addressed at that time by       Family Conference:    No    Attendees Present:    {Annie Spicer, SALO Grigsby, MD Ernestine Martell CM, NP  Aimee Perkins, RN MATTHEW Davidson, OT  Melissa Villafana, H  Freida Guerrero, RD  Aimee Broussard, Wound Care RN  Ernestina, RT      Continued/Extended Stay Review:    Management of at least one of  the following complex respiratory conditions:  Other acute on chronic respiratory failure with hypoxia    Must meet at least three of the following concomitant treatments/intervention daily unless notes: (excludes PO meds unless notes)  Cardiac Monitoring  Complex wound care  Oxygen and SaO2/ABG adjustments and greater than or equal to 28% supplemental O2

## 2025-07-08 NOTE — PROGRESS NOTES
[facility] Medicine   Progress Note  Room: 207/207   Patient Name: Jordin Allen Jr.  MRN: [id number]  Admit Date: 6/3/2025   Length of Stay:  LOS: 35 days   Attending Physician: Bernardino Guthrie MD  Nurse Practitioner: Ernestine Palomino NP    Date of Service: 07/08/2025    Principal Problem:    Acute on chronic respiratory failure with hypoxia    Brief HPI:     Jordin Allen Jr. is a 79 y.o. 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  He presented to [facility] ED on 01/22/2025 with complaints of shortness of breath and left foot redness.  Admitted to hospital medicine team for left foot cellulitis and acute hypoxic respiratory failure secondary to pneumonia/COPD exacerbation. CTA chest negative for PE. Emphysematous change with superimposed edema. Nodular focus within the anterior aspect of left upper lobe.  Started on nebs, prednisone, and empiric vanc/cefepime and doxycycline.  Respiratory panel positive for parainfluenza virus.  Course further complicated by sinus tach with PACs/18, for which he was started on diltiazem.  Blood cultures positive for staph, suspected to be contaminant.  Repeat blood cultures returned negative.  He will switch to Augmentin and doxy.  Ongoing episodes of SVT, thought to be due to underlying hypoxia.  He was able to be weaned down to high-flow nasal cannula 15 L.  Discharged to [facility] on 06/03/2025 for rehab, wound care, and O2 weaning.     6/9-6/11-sent out to [facility] for concerns of MI. Workup revealed findings concerning for pneumonia on CT. Started on empiric vanc and zosyn. Also started on 5 day course of prednisone for COPD exacerbation.    6/18 - 6/22-sent out to [facility] again for increasing O2 requirements and worsening AFib with RVR on diltiazem drip.  Use switch to amiodarone drip at [facility],  and transitioned to p.o. amiodarone successfully.  Infectious workup revealed concerns for aspiration pneumonia.  He was treated with vanc, cefepime, and levofloxacin.  Antibiotics de-escalated to oral levofloxacin.  He was weaned from BiPAP to nasal cannula at 3-5 L.      Interval History    Not eating much over the past few days  Has not required any prn morphine or ativan this morning  Seen and examined with family at bedside, resting comfortably  On 8L n/c this morning  Patient discussed during interdisciplinary team meeting today with attending physician, , nutrition, therapy, respiratory, nursing, pharmacist, and wound care.  D/c plan: referrals sent to inpatient hospice facilities      Subjective:     Review of Systems   Constitutional:  Positive for malaise/fatigue.   Respiratory:  Negative for cough, sputum production and shortness of breath.    Cardiovascular:  Negative for chest pain and leg swelling.   Gastrointestinal:  Negative for heartburn, nausea and vomiting.   Genitourinary:  Negative for dysuria and urgency.   Musculoskeletal:  Positive for joint pain (R shoulder). Negative for myalgias.   Neurological:  Positive for weakness. Negative for dizziness.   Psychiatric/Behavioral:  Negative for depression. The patient does not have insomnia.          Objective:     Vital Sign Range  Temp:  [97.5 °F (36.4 °C)-97.7 °F (36.5 °C)]   Pulse:  [87-96]   Resp:  [18-32]   BP: (110-121)/(60-73)   SpO2:  [93 %-100 %]     Body mass index is 26.85 kg/m².  Oxygen Concentration (%):  [40] 40        Intake/Output Summary (Last 24 hours) at 7/8/2025 0900  Last data filed at 7/8/2025 0548  Gross per 24 hour   Intake 120 ml   Output 500 ml   Net -380 ml       Physical Exam  Constitutional:       Appearance: He is ill-appearing.   HENT:      Head: Normocephalic and atraumatic.      Mouth/Throat:      Mouth: Mucous membranes are moist.      Pharynx: Oropharynx is clear.   Eyes:      Extraocular Movements:  Extraocular movements intact.      Pupils: Pupils are equal, round, and reactive to light.   Cardiovascular:      Rate and Rhythm: Normal rate. Rhythm irregular.   Pulmonary:      Breath sounds: Wheezing and rhonchi present.   Abdominal:      General: Bowel sounds are normal.      Palpations: Abdomen is soft.   Musculoskeletal:      Right lower leg: Edema present.      Left lower leg: Edema present.   Skin:     General: Skin is warm and dry.   Neurological:      Mental Status: He is alert and oriented to person, place, and time. Mental status is at baseline.   Psychiatric:         Mood and Affect: Affect is flat.         Wounds       Wound 05/19/25 0233 Moisture associated dermatitis Left dorsal Foot (Active)   05/19/25 0233 Foot   Present on Original Admission: Y   Primary Wound Type: Moisture ass   Side: Left   Orientation: dorsal        Wound 05/19/25 0231 Moisture associated dermatitis Left lateral Foot (Active)   05/19/25 0231 Foot   Present on Original Admission: Y   Primary Wound Type: Moisture ass   Side: Left   Orientation: lateral        Wound 05/19/25 0231 Pressure Injury Right anterior Ankle (Active)   05/19/25 0231 Ankle   Present on Original Admission: Y   Primary Wound Type: Pressure inj   Side: Right   Orientation: anterior        Wound 05/19/25 0233 Moisture associated dermatitis Left anterior Foot (Active)   05/19/25 0233 Foot   Present on Original Admission:    Primary Wound Type: Moisture ass   Side: Left   Orientation: anterior        Wound 06/04/25 1030 Moisture associated dermatitis Right lateral Abdomen (Active)   06/04/25 1030 Abdomen   Present on Original Admission: Y   Primary Wound Type: Moisture ass   Side: Right   Orientation: lateral        Wound 06/04/25 1030 Moisture associated dermatitis Left lateral Abdomen (Active)   06/04/25 1030 Abdomen   Present on Original Admission: Y   Primary Wound Type: Moisture ass   Side: Left   Orientation: lateral        Wound 06/04/25 1030 Moisture  "associated dermatitis Right Groin (Active)   06/04/25 1030 Groin   Present on Original Admission: Y   Primary Wound Type: Moisture ass   Side: Right        Wound 06/04/25 1030 Moisture associated dermatitis Left Groin (Active)   06/04/25 1030 Groin   Present on Original Admission: Y   Primary Wound Type: Moisture ass   Side: Left        Wound 06/04/25 1030 Moisture associated dermatitis Sacral spine (Active)   06/04/25 1030 Sacral spine   Present on Original Admission: Y   Primary Wound Type: Moisture ass         Wounds consistently followed by [facility] NP Sheree Tao     Labs:  Recent Labs   Lab 07/02/25  0428   WBC 10.06   HGB 8.8*   HCT 27.4*   *     Recent Labs   Lab 07/02/25  0428      K 4.1      CO2 27   BUN 24*   CREATININE 0.7   GLU 75   CALCIUM 8.6*   MG 2.1   PHOS 2.6*     Recent Labs   Lab 07/02/25  0428   ALKPHOS 88   ALT 26   AST 21   ALBUMIN 1.7*   PROT 5.1*   BILITOT 0.8       No results for input(s): "POCTGLUCOSE" in the last 72 hours.      Meds Scheduled:   amiodarone  200 mg Oral Daily    amitriptyline  25 mg Oral QHS    ammonium lactate   Topical (Top) Daily    fluticasone propionate  2 spray Each Nostril Daily    levETIRAcetam  500 mg Oral BID    polyethylene glycol  17 g Oral BID       Current Inpatient Problem List:  Active Hospital Problems    Diagnosis  POA    *Acute on chronic respiratory failure with hypoxia [J96.21]  Yes    Comfort measures only status [Z51.5]  Not Applicable    Parainfluenza virus infection [B34.8]  Yes    Pneumonia of right lung due to infectious organism [J18.9]  Yes    Cellulitis [L03.90]  Yes    TIA (transient ischemic attack) [G45.9]  Yes     ASA and statin      Metastasis to brain [C79.31]  Yes    Adenocarcinoma, lung, 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       CAD (coronary artery " disease) [I25.10]  Yes     Chronic     -[facility] (10/31/13) for stemi: pLAD 100%, Cx with Li's, mRCA 40%, LVEF 55%,. LVEDP 15   -Xience 3.0 x 33 mm to pLAD, post dilated with 3.5 NC and 4.0 NC.   -TTE (8/14/13): LVEF 55%, grade I diastolic dysfunction      HTN (hypertension), benign [I10]  Yes    SHOLA (obstructive sleep apnea) [G47.33]  Yes     CPAP at night      GERD (gastroesophageal reflux disease) [K21.9]  Yes     Continue PPI        Resolved Hospital Problems   No resolved problems to display.         Assessment / Plan:     Acute respiratory failure with hypoxia  COPD with acute exacerbation  Parainfluenza virus infection  Pneumonia   On Symbicort Spiriva and Daliresp at home. Follows up with pulmonology outpatient.   Completed 7 day course of Augmentin and doxycycline on 06/01  Continue Daliresp 500mcg daily  Continue Xopenex p.r.n.   Continue weaning high-flow nasal cannula  Continue guaifenesin 1200 mg b.i.d.  Completed 7 day course of levofloxacin on 6/25  Pulmonology consulted, appreciate recommendations  On 4L nasal cannula this morning   Will need to continue weaning O2, reaching out to inpatient hospice to see if O2 can be weaned there    Comfort measures only   7/2Discussion held again today at bedside with Mr. Allen and daughter on phone. We again discussed goals of care, and Mr. Allen stated he is tired, and just wants to be comfortable. He would like to transition to hospice. We discussed discontinuing non-comfort focused medications as well as labs and monitoring, which he is in agreement with. We also discussed adding morphine for air hunger and ativan for anxiety. He's requesting a dose of morphine now.  Adjusting orders to reflect comfort measures. Can d/c labs and cardiac monitoring. Morphine and Ativan ordered prn.  Received p.r.n. morphine and Ativan overnight for air hunger and anxiety   Ativan and morphine seem to be working well   Continue comfort care measures    Constipation, new   Last  Bowel Movement: 07/07/25  Continue MiraLax b.i.d.   Continue suppository p.r.n.      Thrombocytopenia, new   Aspirin and mirtazapine discontinued on 06/27  Platelets improved to 125   Discontinued labs as patient is now on comfort measures only      Chest pain, new   Resolved   Evaluated by cardiology while in the hospital  Will need outpatient follow up with Dr. Ba with Cardiology     Cellulitis   Wound followed and managed by consistent, contracted [facility] NP  Completed 7 day course of doxy and Augmentin     Restless leg syndrome   Continue amitriptyline 25 mg nightly     Adenocarcinoma, lung, left   Metastasis to brain  Completed treatment with chemo/XRT. Brain XRT for lesionsx2. off immunotherapy since 12/24  suspect the LLL area was consolidation of prior RXT changes and not malignancy and is nearly resolved.  CT on presentation with new SANJAY nodule, need op follow up with pulm  Continue prophylactic Keppra 500 mg b.i.d.    Right shoulder pain  Having some shoulder pain to his right shoulder, which seems to be some muscle spasms.    Continue Robaxin 500 mg q.i.d. p.r.n. on 6/6    Compression fracture of L1 vertebrae with routine healing   TLSO brace when out of bed     Dyslipidemia  TIA  CAD   Will discontinue aspirin and atorvastatin, transitioning to comfort measures     Tachycardia   Episodes of SVT while at the hospital   Evaluated by Cardiology   Likely worsened by underlying hypoxia   Supplemental O2   Uncontrolled AFib with RVR on 06/16, he was placed on diltiazem drip though rate remained difficult to control  Sent back to [facility] and ultimately started on amiodarone for rate control   Continue amiodarone 400 mg b.i.d. for 8 days, then transition to amiodarone 200 mg daily  Plan to continue amiodarone for comfort   Can discontinue cardiac monitoring       SHOLA   Continue CPAP nightly     GERD  Continue PPI daily     Left lateral foot wound   Right anterior ankle wound   Traumatic left dorsal foot  "wound   Left anterior foot venous ulcer   Skin tear right distal arm   Pressure injury nose  Wound followed and managed by consistent, contracted [facility] NP    ACP, 07/02/2025   This was a voluntary visit and the option to decline counseling was given  Length of discussion:  20 minutes  Life limiting problem:  Hypoxemia  Topics discussed:  Goals of care  Outcome of discussion:Mr. Allen stated he is tired, and just wants to be comfortable. He would like to transition to hospice. We discussed discontinuing non-comfort focused medications as well as labs and monitoring, which he is in agreement with. We also discussed adding morphine for air hunger and ativan for anxiety. He's requesting a dose of morphine now.  Adjusting orders to reflect comfort measures. Can d/c labs and cardiac monitoring. Morphine and Ativan ordered prn.  Decision Maker:Jordin Allen      ACP, 7/1/25  This was a voluntary visit and the option to decline counseling was given  Length of discussion: 16 minutes  Life limiting problem:  Respiratory failure  Topics discussed:  Hospice  Outcome of discussion: He states that he understands that he is likely at the end of life, and he "just wants to be comfortable". He would like to include his daughter on further discussions regarding hospice. Discussed with attending MD, may also be able to reach out to palliative care to see if patient could be transferred to H[facility] at [facility]. Giving dose of morphine 2mg for air hunger, and will reach out to palliative care today for further assistance.  Decision Maker: Jordin Allen          ACP 6/19  "Dr. Zaldivar and I held discussion with the patient regarding his goals of care. The patient states that he would want to be intubated for mechanical ventilation if he were to decompensate but in the event of a cardiac arrest, he would not want CPR and would want to be allowed to die peacefully." Per ALBA Argueta NP  Code status changed to DNR     Psychiatric " Assessment  Patient Health Questionnaire-9 (PHQ-9)    Date:  06/05/2025  Total Score: 15  Screening is Positive   Diagnosis and Treatment Plan:  Moderate MDD   Continue amitriptyline 25 mg nightly   Increasing mirtazapine to 15 mg nightly, which will hopefully help with his appetite as well       Diet:  Cardiac, PRN as needed tray currently  GI Ppx:  PPI   DVT Ppx:  Enoxaparin     Lines:  PIV   Drains:  None   Airways:n/a   Wounds:  Multiple    Goals of Care:   Restorative,  Treat infection  Optimize nutrition  Wound healing  Muscle strengthening  Restoration of ADL's  Improve mobility    Anticipated Disposition:    TBD    Follow up appointments:   Future Appointments   Date Time Provider Department Center   7/8/2025 10:00 AM Cara Looney MD [facility] OPHTHAL [place]   7/23/2025 10:15 AM [facility] ECHO [facility]IA [place]   7/28/2025  1:30 PM [facility] OIC-MRI4 [facility]   7/29/2025 10:15 AM Steven Campos MD [facility] NEUROSC [place]   7/29/2025 10:30 AM Bienvenido Henson MD [facility] RADONC3 [place]   8/15/2025  9:20 AM Bienvenido Ward MD [facility] PULMON [place]   8/27/2025  9:00 AM Guido Trejo MD [facility] ENT [place]   9/11/2025  8:30 AM Yan Palmer MD [facility] DERM [place], NP  Hospital Medicine [facility]    I have spent 54 minutes reviewing patient records (not including ACP time), examining, and  of the patient/ patient's family with greater than 50% of the time spent with direct patient care and coordination.

## 2025-07-08 NOTE — PLAN OF CARE
Problem: Pneumonia  Goal: Effective Oxygenation and Ventilation  Outcome: Progressing     Problem: Breathing Pattern Ineffective  Goal: Effective Breathing Pattern  Outcome: Progressing     Problem: Airway Clearance Ineffective  Goal: Effective Airway Clearance  Outcome: Progressing     Problem: Gas Exchange Impaired  Goal: Optimal Gas Exchange  Outcome: Progressing

## 2025-07-08 NOTE — PLAN OF CARE
Problem: Adult Inpatient Plan of Care  Goal: Plan of Care Review  Outcome: Ongoing     Plan of care reviewed with pt. Pt drowsy and sleep most of shift. Safety maintained, call light in reach, bed in lowest position, will continue to monitor.

## 2025-07-08 NOTE — CARE UPDATE
07/08/25 0954   Patient Assessment/Suction   Level of Consciousness (AVPU) alert   Respiratory Effort Mild;Labored   Expansion/Accessory Muscles/Retractions abdominal muscle use   All Lung Fields Breath Sounds Anterior:;Lateral:;crackles, coarse   PRE-TX-O2   Device (Oxygen Therapy) high flow nasal cannula   Flow (L/min) (Oxygen Therapy) 8   SpO2 95 %   Pulse Oximetry Type Intermittent   Pulse 94   Resp (!) 24   Aerosol Therapy   $ Aerosol Therapy Charges Aerosol Treatment   Respiratory Treatment Status (SVN) given   Treatment Route (SVN) air;mask   Patient Position HOB elevated   Post Treatment Assessment (SVN) breath sounds improved   Signs of Intolerance (SVN) none   Breath Sounds Post-Respiratory Treatment   Post-treatment Heart Rate (beats/min) 92   Post-treatment Resp Rate (breaths/min) 20     Patient was SOB after being cleaned, aerosol neb given, WOB decreased after treatment

## 2025-07-08 NOTE — CARE UPDATE
07/07/25 1940   Patient Assessment/Suction   Level of Consciousness (AVPU) responds to voice   Respiratory Effort Normal;Unlabored   Expansion/Accessory Muscles/Retractions expansion symmetric;no retractions;no use of accessory muscles   All Lung Fields Breath Sounds crackles;diminished   Rhythm/Pattern, Respiratory depth regular;unlabored;pattern regular   Cough Frequency no cough   PRE-TX-O2   Device (Oxygen Therapy) high flow nasal cannula w/device   $ Is the patient on High Flow Oxygen? Yes   $ Noninvasive Daily Charge Noninvasive Daily   Flow (L/min) (Oxygen Therapy) 6   Oxygen Concentration (%) 40   SpO2 97 %   Pulse Oximetry Type Intermittent   $ Pulse Oximetry - Single Charge Pulse Oximetry - Single   Pulse 96   Resp 20   Positioning HOB elevated 30 degrees   Aerosol Therapy   $ Aerosol Therapy Charges PRN treatment not required   Respiratory Treatment Status (SVN) PRN treatment not required   General Safety Checklist   Safety Promotion/Fall Prevention side rails raised   Airway Safety   Is Ambu Bag and Mask with Patient? Yes, Adult Ambu Bag and Mask   O2  at bedside? No   Suction set is at the bedside? Yes   Communication board is with the patient? No   Respiratory Interventions   Cough And Deep Breathing done independently per patient   Respiratory Evaluation   $ Care Plan Tech Time 15 min

## 2025-07-08 NOTE — CARE UPDATE
07/08/25 0731   Patient Assessment/Suction   Level of Consciousness (AVPU) alert   Respiratory Effort Unlabored   Expansion/Accessory Muscles/Retractions no retractions;no use of accessory muscles   Skin Integrity   $ Wound Care Tech Time 15 min   Area Observed Left;Right;Behind ear;Cheek;Upper lip   Skin Appearance without discoloration   PRE-TX-O2   Device (Oxygen Therapy) high flow nasal cannula   $ Is the patient on Low Flow Oxygen? Yes   Flow (L/min) (Oxygen Therapy) 8   SpO2 98 %   Pulse Oximetry Type Intermittent   $ Pulse Oximetry - Multiple Charge Pulse Oximetry - Multiple   Pulse 92   Resp 20     Patient is on 8lpm high flow nasal cannula, no resp distress noted at this time

## 2025-07-08 NOTE — RESPIRATORY THERAPY
Patient has been on 8lpm high flow nc during shift, patient became SOB while being cleaned, PRN neb treatment was given, Work of breathing decreased after neb treatment, patient will continue to be monitored for any changes or needs

## 2025-07-09 PROCEDURE — 25000003 PHARM REV CODE 250: Performed by: HOSPITALIST

## 2025-07-09 PROCEDURE — 25000003 PHARM REV CODE 250

## 2025-07-09 PROCEDURE — 94760 N-INVAS EAR/PLS OXIMETRY 1: CPT

## 2025-07-09 PROCEDURE — 94640 AIRWAY INHALATION TREATMENT: CPT

## 2025-07-09 PROCEDURE — 27000221 HC OXYGEN, UP TO 24 HOURS

## 2025-07-09 PROCEDURE — 63600175 PHARM REV CODE 636 W HCPCS: Performed by: STUDENT IN AN ORGANIZED HEALTH CARE EDUCATION/TRAINING PROGRAM

## 2025-07-09 PROCEDURE — 11000001 HC ACUTE MED/SURG PRIVATE ROOM

## 2025-07-09 PROCEDURE — 99900035 HC TECH TIME PER 15 MIN (STAT)

## 2025-07-09 PROCEDURE — 27100171 HC OXYGEN HIGH FLOW UP TO 24 HOURS

## 2025-07-09 PROCEDURE — 99900031 HC PATIENT EDUCATION (STAT)

## 2025-07-09 PROCEDURE — 25000003 PHARM REV CODE 250: Performed by: STUDENT IN AN ORGANIZED HEALTH CARE EDUCATION/TRAINING PROGRAM

## 2025-07-09 PROCEDURE — 25000242 PHARM REV CODE 250 ALT 637 W/ HCPCS

## 2025-07-09 RX ADMIN — METHOCARBAMOL 500 MG: 500 TABLET ORAL at 08:07

## 2025-07-09 RX ADMIN — LEVETIRACETAM 500 MG: 500 TABLET, FILM COATED ORAL at 08:07

## 2025-07-09 RX ADMIN — AMITRIPTYLINE HYDROCHLORIDE 25 MG: 25 TABLET, FILM COATED ORAL at 08:07

## 2025-07-09 RX ADMIN — FLUTICASONE PROPIONATE 100 MCG: 50 SPRAY, METERED NASAL at 09:07

## 2025-07-09 RX ADMIN — OXYCODONE HYDROCHLORIDE 5 MG: 5 TABLET ORAL at 04:07

## 2025-07-09 RX ADMIN — Medication: at 09:07

## 2025-07-09 RX ADMIN — POLYETHYLENE GLYCOL 3350 17 G: 17 POWDER, FOR SOLUTION ORAL at 08:07

## 2025-07-09 RX ADMIN — HYDROXYZINE HYDROCHLORIDE 25 MG: 25 TABLET, FILM COATED ORAL at 08:07

## 2025-07-09 RX ADMIN — LORAZEPAM 1 MG: 2 INJECTION INTRAMUSCULAR; INTRAVENOUS at 04:07

## 2025-07-09 RX ADMIN — OXYCODONE HYDROCHLORIDE 5 MG: 5 TABLET ORAL at 09:07

## 2025-07-09 RX ADMIN — AMIODARONE HYDROCHLORIDE 200 MG: 200 TABLET ORAL at 09:07

## 2025-07-09 RX ADMIN — LEVALBUTEROL HYDROCHLORIDE 1.25 MG: 0.63 SOLUTION RESPIRATORY (INHALATION) at 06:07

## 2025-07-09 RX ADMIN — LEVETIRACETAM 500 MG: 500 TABLET, FILM COATED ORAL at 09:07

## 2025-07-09 RX ADMIN — LORAZEPAM 1 MG: 2 INJECTION INTRAMUSCULAR; INTRAVENOUS at 10:07

## 2025-07-09 RX ADMIN — MELATONIN TAB 3 MG 6 MG: 3 TAB at 08:07

## 2025-07-09 NOTE — CARE UPDATE
07/08/25 1909   Patient Assessment/Suction   Level of Consciousness (AVPU) responds to voice   Respiratory Effort Normal;Unlabored   Expansion/Accessory Muscles/Retractions expansion symmetric;no retractions;no use of accessory muscles   All Lung Fields Breath Sounds coarse;diminished   Rhythm/Pattern, Respiratory depth regular;pattern regular;unlabored   Cough Frequency no cough   PRE-TX-O2   Device (Oxygen Therapy) high flow nasal cannula w/device   $ Is the patient on High Flow Oxygen? Yes   Flow (L/min) (Oxygen Therapy) 5   Oxygen Concentration (%) 36   SpO2 99 %   Pulse Oximetry Type Intermittent   $ Pulse Oximetry - Single Charge Pulse Oximetry - Single   Oximetry Probe Status Assessed;Intact   Pulse 93   Resp 18   Positioning HOB elevated 30 degrees   Aerosol Therapy   $ Aerosol Therapy Charges PRN treatment not required   Respiratory Treatment Status (SVN) PRN treatment not required   Respiratory Interventions   Cough And Deep Breathing done independently per patient   Respiratory Evaluation   $ Care Plan Tech Time 15 min

## 2025-07-09 NOTE — NURSING
VSS overnight.  Able to take night PO medications and requested melatonin, atarax and pain medication. Able to make needs known.  BM x2. Safety maintained.

## 2025-07-09 NOTE — PROGRESS NOTES
[facility] Medicine   Progress Note  Room: 207/207   Patient Name: Jordin Allen Jr.  MRN: [id number]  Admit Date: 6/3/2025   Length of Stay:  LOS: 36 days   Attending Physician: Bernardino Guthrie MD  Nurse Practitioner: Ernestine Palomino NP    Date of Service: 07/09/2025    Principal Problem:    Acute on chronic respiratory failure with hypoxia    Brief HPI:     Jordin Allen Jr. is a 79 y.o. 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  He presented to [facility] ED on 01/22/2025 with complaints of shortness of breath and left foot redness.  Admitted to hospital medicine team for left foot cellulitis and acute hypoxic respiratory failure secondary to pneumonia/COPD exacerbation. CTA chest negative for PE. Emphysematous change with superimposed edema. Nodular focus within the anterior aspect of left upper lobe.  Started on nebs, prednisone, and empiric vanc/cefepime and doxycycline.  Respiratory panel positive for parainfluenza virus.  Course further complicated by sinus tach with PACs/18, for which he was started on diltiazem.  Blood cultures positive for staph, suspected to be contaminant.  Repeat blood cultures returned negative.  He will switch to Augmentin and doxy.  Ongoing episodes of SVT, thought to be due to underlying hypoxia.  He was able to be weaned down to high-flow nasal cannula 15 L.  Discharged to [facility] on 06/03/2025 for rehab, wound care, and O2 weaning.     6/9-6/11-sent out to [facility] for concerns of MI. Workup revealed findings concerning for pneumonia on CT. Started on empiric vanc and zosyn. Also started on 5 day course of prednisone for COPD exacerbation.    6/18 - 6/22-sent out to [facility] again for increasing O2 requirements and worsening AFib with RVR on diltiazem drip.  Use switch to amiodarone drip at [facility],  and transitioned to p.o. amiodarone successfully.  Infectious workup revealed concerns for aspiration pneumonia.  He was treated with vanc, cefepime, and levofloxacin.  Antibiotics de-escalated to oral levofloxacin.  He was weaned from BiPAP to nasal cannula at 3-5 L.      Interval History    No issues overnight   Has not required any p.r.n. morphine or Ativan today   On 4 L nasal cannula   Appears comfortable at time of exam, very lethargic.  Will briefly open eyes but quickly falls back asleep      Subjective:     Review of Systems   Unable to perform ROS: Medical condition         Objective:     Vital Sign Range  Temp:  [97.4 °F (36.3 °C)-98.4 °F (36.9 °C)]   Pulse:  []   Resp:  [14-20]   BP: (117-145)/(60-90)   SpO2:  [90 %-100 %]     Body mass index is 26.85 kg/m².  Oxygen Concentration (%):  [36-40] 40        Intake/Output Summary (Last 24 hours) at 7/9/2025 1130  Last data filed at 7/9/2025 0959  Gross per 24 hour   Intake 180 ml   Output 800 ml   Net -620 ml       Physical Exam  Constitutional:       Appearance: He is ill-appearing.   HENT:      Head: Normocephalic and atraumatic.      Mouth/Throat:      Mouth: Mucous membranes are moist.      Pharynx: Oropharynx is clear.   Eyes:      Extraocular Movements: Extraocular movements intact.      Pupils: Pupils are equal, round, and reactive to light.   Cardiovascular:      Rate and Rhythm: Normal rate. Rhythm irregular.   Pulmonary:      Breath sounds: Wheezing and rhonchi present.   Abdominal:      General: Bowel sounds are normal.      Palpations: Abdomen is soft.   Musculoskeletal:      Right lower leg: Edema present.      Left lower leg: Edema present.   Skin:     General: Skin is warm and dry.   Neurological:      Mental Status: He is lethargic.   Psychiatric:         Mood and Affect: Affect is flat.         Wounds       Wound 05/19/25 0233 Moisture associated dermatitis Left dorsal Foot (Active)   05/19/25 0233 Foot   Present on Original Admission:  "Y   Primary Wound Type: Moisture ass   Side: Left   Orientation: dorsal        Wound 05/19/25 0231 Moisture associated dermatitis Left lateral Foot (Active)   05/19/25 0231 Foot   Present on Original Admission: Y   Primary Wound Type: Moisture ass   Side: Left   Orientation: lateral        Wound 05/19/25 0231 Pressure Injury Right anterior Ankle (Active)   05/19/25 0231 Ankle   Present on Original Admission: Y   Primary Wound Type: Pressure inj   Side: Right   Orientation: anterior        Wound 05/19/25 0233 Moisture associated dermatitis Left anterior Foot (Active)   05/19/25 0233 Foot   Present on Original Admission:    Primary Wound Type: Moisture ass   Side: Left   Orientation: anterior        Wound 06/04/25 1030 Moisture associated dermatitis Right lateral Abdomen (Active)   06/04/25 1030 Abdomen   Present on Original Admission: Y   Primary Wound Type: Moisture ass   Side: Right   Orientation: lateral        Wound 06/04/25 1030 Moisture associated dermatitis Left lateral Abdomen (Active)   06/04/25 1030 Abdomen   Present on Original Admission: Y   Primary Wound Type: Moisture ass   Side: Left   Orientation: lateral        Wound 06/04/25 1030 Moisture associated dermatitis Right Groin (Active)   06/04/25 1030 Groin   Present on Original Admission: Y   Primary Wound Type: Moisture ass   Side: Right        Wound 06/04/25 1030 Moisture associated dermatitis Left Groin (Active)   06/04/25 1030 Groin   Present on Original Admission: Y   Primary Wound Type: Moisture ass   Side: Left        Wound 06/04/25 1030 Moisture associated dermatitis Sacral spine (Active)   06/04/25 1030 Sacral spine   Present on Original Admission: Y   Primary Wound Type: Moisture ass         Wounds consistently followed by [facility]     Labs:  No results for input(s): "WBC", "HGB", "HCT", "PLT" in the last 168 hours.    No results for input(s): "NA", "K", "CL", "CO2", "BUN", "CREATININE", "GLU", "CALCIUM", "MG", "PHOS", " ""LIPASE", "AMYLASE" in the last 168 hours.    No results for input(s): "ALKPHOS", "ALT", "AST", "ALBUMIN", "PROT", "BILITOT", "INR" in the last 168 hours.      No results for input(s): "POCTGLUCOSE" in the last 72 hours.      Meds Scheduled:   amiodarone  200 mg Oral Daily    amitriptyline  25 mg Oral QHS    ammonium lactate   Topical (Top) Daily    fluticasone propionate  2 spray Each Nostril Daily    levETIRAcetam  500 mg Oral BID    polyethylene glycol  17 g Oral BID       Current Inpatient Problem List:  Active Hospital Problems    Diagnosis  POA    *Acute on chronic respiratory failure with hypoxia [J96.21]  Yes    Comfort measures only status [Z51.5]  Not Applicable    Parainfluenza virus infection [B34.8]  Yes    Pneumonia of right lung due to infectious organism [J18.9]  Yes    Cellulitis [L03.90]  Yes    TIA (transient ischemic attack) [G45.9]  Yes     ASA and statin      Metastasis to brain [C79.31]  Yes    Adenocarcinoma, lung, 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       CAD (coronary artery disease) [I25.10]  Yes     Chronic     -[facility] (10/31/13) for stemi: pLAD 100%, Cx with Li's, mRCA 40%, LVEF 55%,. LVEDP 15   -Xience 3.0 x 33 mm to pLAD, post dilated with 3.5 NC and 4.0 NC.   -TTE (8/14/13): LVEF 55%, grade I diastolic dysfunction      HTN (hypertension), benign [I10]  Yes    SHOLA (obstructive sleep apnea) [G47.33]  Yes     CPAP at night      GERD (gastroesophageal reflux disease) [K21.9]  Yes     Continue PPI        Resolved Hospital Problems   No resolved problems to display.         Assessment / Plan:     Acute respiratory failure with hypoxia  COPD with acute exacerbation  Parainfluenza virus infection  Pneumonia   On Symbicort Spiriva and Daliresp at home. Follows up with pulmonology outpatient.   Completed 7 day course of Augmentin and " doxycycline on 06/01  Continue Daliresp 500mcg daily  Continue Xopenex p.r.n.   Continue weaning high-flow nasal cannula  Continue guaifenesin 1200 mg b.i.d.  Completed 7 day course of levofloxacin on 6/25  Pulmonology consulted, appreciate recommendations  On 4L nasal cannula this morning   Will need to continue weaning O2, reaching out to inpatient hospice to see if O2 can be weaned there    Comfort measures only   7/2Discussion held again today at bedside with Mr. Allen and daughter on phone. We again discussed goals of care, and Mr. Allen stated he is tired, and just wants to be comfortable. He would like to transition to hospice. We discussed discontinuing non-comfort focused medications as well as labs and monitoring, which he is in agreement with. We also discussed adding morphine for air hunger and ativan for anxiety. He's requesting a dose of morphine now.  Adjusting orders to reflect comfort measures. Can d/c labs and cardiac monitoring. Morphine and Ativan ordered prn.  Received p.r.n. morphine and Ativan overnight for air hunger and anxiety   Ativan and morphine seem to be working well   Continue comfort care measures    Constipation, new   Last Bowel Movement: 07/08/25  Continue MiraLax b.i.d.   Continue suppository p.r.n.      Thrombocytopenia, new   Aspirin and mirtazapine discontinued on 06/27  Platelets improved to 125   Discontinued labs as patient is now on comfort measures only      Chest pain, new   Resolved   Evaluated by cardiology while in the hospital  Will need outpatient follow up with Dr. Ba with Cardiology     Cellulitis   Wound followed and managed by consistent, contracted [facility] NP  Completed 7 day course of doxy and Augmentin     Restless leg syndrome   Continue amitriptyline 25 mg nightly     Adenocarcinoma, lung, left   Metastasis to brain  Completed treatment with chemo/XRT. Brain XRT for lesionsx2. off immunotherapy since 12/24  suspect the LLL area was consolidation  of prior RXT changes and not malignancy and is nearly resolved.  CT on presentation with new SANJAY nodule, need op follow up with pulm  Continue prophylactic Keppra 500 mg b.i.d.    Right shoulder pain  Having some shoulder pain to his right shoulder, which seems to be some muscle spasms.    Continue Robaxin 500 mg q.i.d. p.r.n. on 6/6    Compression fracture of L1 vertebrae with routine healing   TLSO brace when out of bed     Dyslipidemia  TIA  CAD   Will discontinue aspirin and atorvastatin, transitioning to comfort measures     Tachycardia   Episodes of SVT while at the hospital   Evaluated by Cardiology   Likely worsened by underlying hypoxia   Supplemental O2   Uncontrolled AFib with RVR on 06/16, he was placed on diltiazem drip though rate remained difficult to control  Sent back to [facility] and ultimately started on amiodarone for rate control   Continue amiodarone 400 mg b.i.d. for 8 days, then transition to amiodarone 200 mg daily  Plan to continue amiodarone for comfort   Can discontinue cardiac monitoring       SHOLA   Continue CPAP nightly     GERD  Continue PPI daily     Left lateral foot wound   Right anterior ankle wound   Traumatic left dorsal foot wound   Left anterior foot venous ulcer   Skin tear right distal arm   Pressure injury nose  Wound followed and managed by consistent, contracted [facility] NP    ACP, 07/02/2025   This was a voluntary visit and the option to decline counseling was given  Length of discussion:  20 minutes  Life limiting problem:  Hypoxemia  Topics discussed:  Goals of care  Outcome of discussion:Mr. Allen stated he is tired, and just wants to be comfortable. He would like to transition to hospice. We discussed discontinuing non-comfort focused medications as well as labs and monitoring, which he is in agreement with. We also discussed adding morphine for air hunger and ativan for anxiety. He's requesting a dose of morphine now.  Adjusting orders to reflect comfort measures.  "Can d/c labs and cardiac monitoring. Morphine and Ativan ordered prn.  Decision Maker:Jordin Allen      ACP, 7/1/25  This was a voluntary visit and the option to decline counseling was given  Length of discussion: 16 minutes  Life limiting problem:  Respiratory failure  Topics discussed:  Hospice  Outcome of discussion: He states that he understands that he is likely at the end of life, and he "just wants to be comfortable". He would like to include his daughter on further discussions regarding hospice. Discussed with attending MD, may also be able to reach out to palliative care to see if patient could be transferred to H[facility] at [facility]. Giving dose of morphine 2mg for air hunger, and will reach out to palliative care today for further assistance.  Decision Maker: Jordin Allen          ACP 6/19  "Dr. Zaldivar and I held discussion with the patient regarding his goals of care. The patient states that he would want to be intubated for mechanical ventilation if he were to decompensate but in the event of a cardiac arrest, he would not want CPR and would want to be allowed to die peacefully." Per ALBA Argueta NP  Code status changed to DNR     Psychiatric Assessment  Patient Health Questionnaire-9 (PHQ-9)    Date:  06/05/2025  Total Score: 15  Screening is Positive   Diagnosis and Treatment Plan:  Moderate MDD   Continue amitriptyline 25 mg nightly   Increasing mirtazapine to 15 mg nightly, which will hopefully help with his appetite as well       Diet:  Cardiac, PRN as needed tray currently  GI Ppx:  PPI   DVT Ppx:  Enoxaparin     Lines:  PIV   Drains:  None   Airways:n/a   Wounds:  Multiple    Goals of Care:   Restorative,  Treat infection  Optimize nutrition  Wound healing  Muscle strengthening  Restoration of ADL's  Improve mobility    Anticipated Disposition:    TBD    Follow up appointments:   Future Appointments   Date Time Provider Department Center   7/23/2025 10:15 AM CV [facility] ECHO [facility] CARDISHADE [place]   7/28/2025  " 1:30 PM [facility] OI-MRI4 [facility] MRI [facility]   7/29/2025 10:15 AM Steven Campos MD [facility] NEUROSC [place]   7/29/2025 10:30 AM Bienvenido Henson MD [facility] RADONC3 [place]   8/15/2025  9:20 AM Bienvenido Ward MD [facility] PULMON [place]   8/27/2025  9:00 AM Guido Trejo MD [facility] ENT [place]   9/11/2025  8:30 AM Yan Palmer MD [facility] DERM [place], NP  Hospital Medicine [facility]    I have spent 51 minutes reviewing patient records (not including ACP time), examining, and  of the patient/ patient's family with greater than 50% of the time spent with direct patient care and coordination.

## 2025-07-09 NOTE — PROGRESS NOTES
Referral sent to [facility], [facility] and [facility], awaiting response

## 2025-07-09 NOTE — TELEPHONE ENCOUNTER
----- Message from Alicia Resendiz, Patient Care Assistant sent at 5/24/2022  9:31 AM CDT -----  Regarding: URGENT  Contact: Pt  Pt pharmacy [facility] is calling in regards to medication refill. Sent a previous messgae regarding this matter but Pharmacy is the mail order instead of store . [facility] is stating that pt will get a 90 day supply this way. [facility] is requesting a call back with prescription or fax to number listed below.      Medication: SYMBICORT 160-4.5 mcg/actuation HFAA    Pharmacy:  [facility] - [place], [place] - [address] AT PORTAL TO REGISTERED [facility] SITES    [facility] Fax @ [phone]    [facility] @ [phone] Option 3     hernia mesh- umbilical, dental posts

## 2025-07-09 NOTE — PROGRESS NOTES
[facility] returned call will pass on to pallative MD to evaluate pt for in pt at [facility], awaiting response

## 2025-07-09 NOTE — PROGRESS NOTES
07/09/25 0959   Pressure Injury Prevention    Check Moisture Management Pad Done   Sacral Foam Dressing Patient incontinent, barrier cream applied   Heel protection technique Foam dressing   Heel preventative measures Peel back dressing/boot, assess skin and reapply   Check Medical Devices Done        Wound 06/04/25 1030 Moisture associated dermatitis Sacral spine   Date First Assessed/Time First Assessed: 06/04/25 1030   Present on Original Admission: Yes  Primary Wound Type: (c) Moisture associated dermatitis  Location: (c) Sacral spine   Wound Image    Dressing Appearance Open to air   Drainage Amount None   Red (%), Wound Tissue Color 100 %   Periwound Area Moist;Pink   Care Cleansed with:;Wound cleanser   Dressing   (applied Triad paste, open to air)   Skin Interventions   Device Skin Pressure Protection absorbent pad utilized/changed   Daily Care   Elimination Assistance incontinence pad changed;urinal within reach   Hygiene Care   Bathing/Skin Care back care;bath, complete;dressed/undressed  (face clean)   Oral Care lip/mouth moisturizer applied   Perineal Care absorbent brief/pad changed   Hygiene Assistance per family/significant other x 1   CHG Bath Bath completed   Linen Change linen change completed

## 2025-07-09 NOTE — PROGRESS NOTES
Pt accepted by [facility] and [facility], both will call the daughter to make a decision, [facility] liaison visited pt at bedside, called daughter no answer voicemail left, email sent regarding acceptance to daughter, awaiting response on decision.

## 2025-07-09 NOTE — PROGRESS NOTES
Nutrition Follow Up Note    General Info/Comments:  Pt moved to regular diet d/t comfort measures initiated. Comment on diet not to send trays unless requested by pt. Pt not eating. Current wt 181# - decrease of 6# since last week. RD to monitor.    Recommendations:  Recommendations:   1. Continue regular diet.   2. Continue Boost Plus at all meals.   3. Monitor and encourage PO intake.   4. Monitor weight changes and labs.    Goal:  Goals: Pt to consistently meet >50% assessed needs through PO and supplement intake by RD follow up.  Nutrition Goal Status #1: progressing towards goal    Assessment and Plan:  PES Statement  Nutrition PES Problem: Inadequate protein energy intake  Nutrition PES Etiology: Wound healing  Nutrition PES Signs and Symptoms: Wounds (Inconsistent PO intake)  Nutrition PES Status: Worsening    Diet Order:  Current Diet Order: Heart Healthy     Oral Nutrition Supplement: Boost Pkus Chocolate at all meals    Food Allergies:  Food Allergies: NKFA    Spiritual, Cultural Beliefs, Baptist Preferences that Affect Care:  Spiritual, Cultural Beliefs, Baptist Practices, Values that Affect Care: no    Principal Problem:  Acute on chronic respiratory failure with hypoxia    Weights:  Wt Readings from Last 3 Encounters:   07/08/25 82.5 kg (181 lb 14.1 oz)   06/18/25 87.1 kg (192 lb 0.3 oz)   06/11/25 87.4 kg (192 lb 10.9 oz)       Estimated/Assessed Needs:  Energy Calorie Requirements (kcal): 1814 (25 kcal/kg IBW)  Energy Need Method: Kcal/kg (IBW)  Protein Requirements: 102 g (1.4 g/kg IBW)  Fluid Requirements (mL): 1814    Labs:  Lab Results   Component Value Date/Time     07/02/2025 04:28 AM     02/12/2025 02:24 AM    K 4.1 07/02/2025 04:28 AM    K 3.6 02/12/2025 02:24 AM    EGFRNORACEVR >60 07/02/2025 04:28 AM    EGFRNORACEVR >60.0 02/25/2025 10:33 AM    CALCIUM 8.6 (L) 07/02/2025 04:28 AM    CALCIUM 8.9 02/12/2025 02:24 AM    PHOS 2.6 (L) 07/02/2025 04:28 AM    PHOS 2.6 (L) 02/12/2025  02:24 AM    MG 2.1 07/02/2025 04:28 AM    ALBUMIN 1.7 (L) 07/02/2025 04:28 AM    ALBUMIN 3.0 (L) 02/12/2025 02:24 AM    .6 (H) 05/22/2025 12:22 PM    TRIG 64 02/07/2025 05:01 PM    HGBA1C 5.7 (H) 03/06/2024 09:53 AM       Medications:  Scheduled Meds:   amiodarone  200 mg Oral Daily    amitriptyline  25 mg Oral QHS    ammonium lactate   Topical (Top) Daily    fluticasone propionate  2 spray Each Nostril Daily    levETIRAcetam  500 mg Oral BID    polyethylene glycol  17 g Oral BID     Continuous Infusions:  PRN Meds:.  Current Facility-Administered Medications:     acetaminophen, 650 mg, Oral, Q8H PRN    benzonatate, 100 mg, Oral, TID PRN    bisacodyL, 10 mg, Rectal, Daily PRN    budesonide, 0.5 mg, Nebulization, Q6H PRN    calcium carbonate, 500 mg, Oral, TID PRN    hydrOXYzine HCL, 25 mg, Oral, TID PRN    levalbuterol, 1.26 mg, Nebulization, Q6H PRN    lorazepam, 1 mg, Intravenous, Q4H PRN    melatonin, 6 mg, Oral, Nightly PRN    methocarbamoL, 500 mg, Oral, TID PRN    morphine, 2 mg, Intravenous, Q4H PRN    nitroGLYCERIN, 0.4 mg, Sublingual, Q5 Min PRN    ondansetron, 4 mg, Oral, Q6H PRN    oxyCODONE, 5 mg, Oral, Q4H PRN    sodium chloride 0.9%, 10 mL, Intravenous, Q12H PRN    Nutrition Risk:  Nutrition Risk  Level of Risk/Frequency of Follow-up: moderate     Follow Up: Weekly    Monitor and Evaluation:  Monitor and Evaluation  Monitor and Evaluation: Energy intake, Food and beverage intake, Protein intake, Diet order, Food and nutrition knowledge, Weight, Beliefs and attitudes, Electrolyte and renal panel, Gastrointestinal profile, Glucose/endocrine profile, Inflammatory profile, Lipid profile, Nutrition focused physical findings, Skin

## 2025-07-09 NOTE — PLAN OF CARE
Pt AOX4. Pleasant and cooperative.  Low appetite. Ate 25% of meals. Anxiety and pain medication given in the afternoon. Safety maintained.  Repositioned q2h for comfort.    Problem: Adult Inpatient Plan of Care  Goal: Optimal Comfort and Wellbeing  Outcome: Progressing     Problem: Pneumonia  Goal: Effective Oxygenation and Ventilation  Outcome: Progressing     Problem: Wound  Goal: Improved Oral Intake  Outcome:  No Progressing     Problem: Wound  Goal: Optimal Wound Healing  Outcome: Progressing

## 2025-07-09 NOTE — PROGRESS NOTES
[facility]  Wound Care Progress Note  Attending Physician: Bernardino Guthrie MD  Primary Care Physician: Sierra Landry MD  Date of Admit: 6/3/2025      Chief Complaint    Wound to left leg   History of Present Illness:     Per attending   HPI: Jordin Allen Jr. 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 Patient has not felt this chest pain since the episode. Currently no chest pain. Does endorse SOB and cough. Reports he is not coughing up anything because he feels like it is stuck in his chest. Denies fever, chills, chest pain, palpitations,  abdominal pain, n/v/d, dysuria, headaches, or any other symptoms at this time.      In ED: AF, VSS on 6-8L HFNC. CBC with leukocytosis. Hgb 11.9. CMP notable for Na 135, mild elevation in ALT. Phos 2.4. BNP 62. HS trop 12. LA 0.9. covid/flu negative. ABG with pH 7.499, pCO2 56.3, pO2 79, HCO3 43.8. Blood cultures pending. CTA chest with no evidence of acute PE, mild ill-defined airspace opacities in the dependent portions of the right upper and middle lobes when compared to 05/22/2025, nonspecific.  This could be due to atelectasis, aspiration and/or pneumonia 5 mm left upper lobe nodule, unchanged from 05/22/2025 but new when compared to 03/05/2025.  A follow-up chest CT in 1 year could be considered if the patient is at increased risk of developing lung cancer, such as from a smoking history. S/p duoneb, dexamethasone 10mg inj, vanc and zosyn in ED. Admitted to  for further evaluation.     6/4/2025 Patient seen for wound to left leg  Patient seen lying in bed. No acute distress. Wound care done with nurse. No issues or complaints at  time. Cont POC Plan to f/u on 6/5 6/6/2025 Patient seen lying in bed. No acute distress. Wound care done with nurse. No issues or complaints at time. Cont POC Plan to f/u on 6/9 6/12/2025 Patient seen lying in bed. Patient returned from acute stay. No acute distress. Wound care done with nurse. No issues or complaints at time. Cont POC Plan to f/u on 6/13 6/13/2925 Patient seen lying in bed. No acute distress. Wound care done with nurse. No issues or complaints at time. Cont POC Plan to f/u on  6/16 6/16/2025 Patient seen lying in bed. No acute distress. Wound care done with nurse. No issues or complaints at time. Cont POC Plan to f/u on  6/17 6/17/2025 Patient seen lying in bed. No acute distress. Wound care done with nurse. No issues or complaints at time. Cont POC     6/23/2025 Patient seen lying in bed. No acute distress. Wound care done with nurse. No issues or complaints at time. Cont POC Plan to f/u on 6/24 6/26/2025 Patient seen lying in bed. No acute distress. Wound care done with nurse. No issues or complaints at time. Cont POC Plan to f/u on 6/27 7/9/2025 Patient seen lying in bed. No acute distress. Wound care done with nurse. No issues or complaints at time. Cont POC Plan to f/u on 7/9    Past medical history:     Past Medical History:   Diagnosis Date    Benign neoplasm of colon 10/01/2013    Bronchitis chronic     CAD (coronary artery disease) 10/31/2013    Cataract 2008    COPD (chronic obstructive pulmonary disease)     Emphysema of lung     Encounter for preventive health examination 03/21/2018    GERD (gastroesophageal reflux disease)     Glaucoma 2010    Hearing loss 2015    Heart attack 2013    Hx of colonic polyps     Hypertension     NAFLD (nonalcoholic fatty liver disease) 03/27/2017    SHOLA on CPAP     RLS (restless legs syndrome)     Screen for colon cancer 08/30/2013     Past medical history was reviewed and was otherwise negative except as above.    Past surgical  history:     Past Surgical History:   Procedure Laterality Date    bilateral foot surgery  1993    CARDIAC CATHETERIZATION  2013    x1    CATARACT EXTRACTION, BILATERAL      COLON SURGERY  2013    Ace Mott MD    COLONOSCOPY N/A 03/21/2018    Procedure: COLONOSCOPY;  Surgeon: Terry Mott MD;  Location: [facility] ENDO (4TH FLR);  Service: Endoscopy;  Laterality: N/A;    COLONOSCOPY N/A 04/12/2019    Procedure: COLONOSCOPY;  Surgeon: John Mantilla MD;  Location: [facility] ENDO (4TH FLR);  Service: Endoscopy;  Laterality: N/A;    COLONOSCOPY N/A 05/31/2022    Procedure: COLONOSCOPY;  Surgeon: MEDINA Farris MD;  Location: [facility] ENDO (4TH FLR);  Service: Endoscopy;  Laterality: N/A;  fully vacc-inst portal-tb    ENDOBRONCHIAL ULTRASOUND Bilateral 04/30/2024    Procedure: ENDOBRONCHIAL ULTRASOUND (EBUS);  Surgeon: Naveed Aguero MD;  [facility];  Service: Pulmonary;  Laterality: Bilateral;    EYE SURGERY  2011    scrotal abscess removal      TYMPANOSTOMY TUBE PLACEMENT  2017     Past surgical history was reviewed and was noncontributory except as above.    Allergies:     Review of patient's allergies indicates:   Allergen Reactions    Brilinta [ticagrelor] Itching    Lisinopril      Other reaction(s): cough    Metoprolol succinate Rash     Allergies were reviewed and were negative except as above.    Home Medications:     Prior to Admission medications    Medication Sig Start Date End Date Taking? Authorizing Provider   acetaminophen (TYLENOL) 500 MG tablet Take 500 mg by mouth 2 (two) times daily as needed for Pain.    Provider, Historical   albuterol (ACCUNEB) 0.63 mg/3 mL Nebu Inhale 3 mLs (0.63 mg total) by nebulization every 6 (six) hours as needed (if symptoms failed to improve with inhaler). Rescue 12/10/24   Bienvenido Ward MD   albuterol (PROVENTIL/VENTOLIN HFA) 90 mcg/actuation inhaler Inhale 2 puffs into the lungs every 4 (four) hours as needed for Wheezing. 12/10/24   Bienvenido Ward,  MD   amitriptyline (ELAVIL) 25 MG tablet Take 1 tablet (25 mg total) by mouth once daily.  Patient taking differently: Take 25 mg by mouth every evening. 9/3/24   Sierra Landry MD   aspirin (ECOTRIN) 81 MG EC tablet Take 81 mg by mouth every morning.    Provider, Historical   atorvastatin (LIPITOR) 80 MG tablet Take 1 tablet (80 mg total) by mouth in the morning. 4/21/25   Sierra Landry MD   BETA-CAROTENE,A, W-C & E/MIN (OCUVITE ORAL) Take 1 capsule by mouth once daily.     Provider, Historical   budesonide-glycopyr-formoterol (BREZTRI AEROSPHERE) 160-9-4.8 mcg/actuation HFAA Inhale 2 puffs into the lungs 2 (two) times a day. 12/10/24   Bienvenido Ward MD   dexAMETHasone (DECADRON) 4 MG Tab Take 1 tablet (4 mg total) by mouth 2 (two) times daily.  Patient not taking: Reported on 5/16/2025 3/26/25   Bienvenido Henson MD   echinacea 400 mg Cap Take 1 capsule by mouth once daily.     Provider, Historical   fluticasone propionate (FLONASE) 50 mcg/actuation nasal spray Spray 2 sprays (100 mcg total) in Each Nostril once daily. 10/23/24   Caren Shah MD   furosemide (LASIX) 20 MG tablet Take 1 tablet (20 mg total) by mouth once daily. 4/15/25 4/15/26  Bienvenido Ward MD   ibuprofen (ADVIL,MOTRIN) 200 MG tablet Take 200 mg by mouth every 8 (eight) hours as needed for Pain. 2/21/25   Provider, Historical   latanoprost 0.005 % ophthalmic solution Instill 1 drop into both eyes every night at bedtime 1/30/25      levETIRAcetam (KEPPRA) 500 MG Tab Take 1 tablet (500 mg total) by mouth 2 (two) times daily. 3/24/25 3/24/26  Janene Nelson PA-C   losartan (COZAAR) 100 MG tablet Take 1 tablet (100 mg total) by mouth once daily. 12/19/24   Prince Ba MD   nitroGLYCERIN (NITROSTAT) 0.4 MG SL tablet Place 1 tablet (0.4 mg total) under the tongue every 5 (five) minutes as needed. 5/6/24   Prince Ba MD   omeprazole (PRILOSEC) 20 MG capsule Take 20 mg by mouth once daily.    Provider,  "Historical   polyethylene glycol (GLYCOLAX) 17 gram/dose powder Take 17 grams by mouth every day for constipation prevention 25      roflumilast (DALIRESP) 500 mcg Tab Take 500 mcg by mouth every morning. 25   Provider, Historical   sennosides (SENNA ORAL) Take 1 tablet by mouth daily as needed (Constipation).    Provider, Historical   traZODone (DESYREL) 50 MG tablet Take 1 tablet (50 mg total) by mouth every evening. 25   Lou Muro NP       Family History:     Family History   Problem Relation Name Age of Onset    Heart disease Mother jc deutsch     Heart attack Mother jc deutsch     Hypertension Mother jc deutsch     No Known Problems Sister      No Known Problems Daughter 2     Diabetes Maternal Grandmother imtiaz jennings     Asthma Neg Hx      Emphysema Neg Hx      Prostate cancer Neg Hx      Cirrhosis Neg Hx       Family history was reviewed and was otherwise negative except as above.    Social History:   Social History[1]  Social history was reviewed and was otherwise negative except as above    Review of Systems   A 10 point review of systems was conducted and was negative except as described in the HPI.  Patient denies Chest Pain.  Patient denies shortness of breath.  Patient denies fevers.  Patient denies chills.    Physical Examination:   Triage: BP: 123/77  Pulse: 101  Temp: 97.6 °F (36.4 °C)  Resp: 18  Height: 5' 9" (175.3 cm)  Weight: 86.8 kg (191 lb 5.8 oz) (witness by SALO Escalante and CGregCGreg RT.)  BMI (Calculated): 28.2  SpO2: 97 %  Exam: BP (!) 145/90 (BP Location: Right arm, Patient Position: Lying)   Pulse (!) 118   Temp 97.4 °F (36.3 °C) (Axillary)   Resp 18   Ht 5' 9.02" (1.753 m)   Wt 82.5 kg (181 lb 14.1 oz)   SpO2 95%   BMI 26.85 kg/m²     Vitals  BP  Min: 117/61  Max: 145/90  Temp  Av.8 °F (36.6 °C)  Min: 97.4 °F (36.3 °C)  Max: 98.1 °F (36.7 °C)  Pulse  Av.8  Min: 90  Max: 118  Resp  Av.7  Min: 14  Max: 20  SpO2  Av %  Min: 90 %  Max: " 100 %    General Pt alert and oriented, not in apparent distress, good nutrition  Eyes: No conjunctival erythema; normal appearing lids  HEENT: Normocephalic atraumatic, mucous membranes moist  Cardiovascular: No edema  Respiratory: Non-labored, no accessory muscle use, no wheezing  Abdomen: Soft, non tender, non distended, no rebound  Musculoskeletal: no clubbing or cyanosis of digits  Neuro: Grossly intact, weakness upper/lower extremities  Psych: Good mood, full affect, good insight  Skin:  07/09/25 0959    Pressure Injury Prevention    Check Moisture Management Pad Done   Sacral Foam Dressing Patient incontinent, barrier cream applied   Heel protection technique Foam dressing   Heel preventative measures Peel back dressing/boot, assess skin and reapply   Check Medical Devices Done        Wound 06/04/25 1030 Moisture associated dermatitis Sacral spine   Date First Assessed/Time First Assessed: 06/04/25 1030   Present on Original Admission: Yes  Primary Wound Type: (c) Moisture associated dermatitis  Location: (c) Sacral spine   Wound Image    Dressing Appearance Open to air   Drainage Amount None   Red (%), Wound Tissue Color 100 %   Periwound Area Moist;Pink   Care Cleansed with:;Wound cleanser   Dressing    (applied Triad paste, open to air)   Skin Interventions   Device Skin Pressure Protection absorbent pad utilized/changed   Daily Care   Elimination Assistance incontinence pad changed;urinal within reach   Hygiene Care   Bathing/Skin Care back care;bath, complete;dressed/undressed  (face clean)   Oral Care lip/mouth moisturizer applied   Perineal Care absorbent brief/pad changed   Hygiene Assistance per family/significant other x 1   CHG Bath Bath completed   Linen Change linen change completed        Laboratory:  Most Recent Data:  CBC:   Lab Results   Component Value Date    WBC 10.06 07/02/2025    HGB 8.8 (L) 07/02/2025    HCT 27.4 (L) 07/02/2025     (L) 07/02/2025    MCV 87 07/02/2025    RDW 17.2  "(H) 07/02/2025     BMP:   Lab Results   Component Value Date     07/02/2025    K 4.1 07/02/2025     07/02/2025    CO2 27 07/02/2025    BUN 24 (H) 07/02/2025    CREATININE 0.7 07/02/2025    GLU 75 07/02/2025    CALCIUM 8.6 (L) 07/02/2025    MG 2.1 07/02/2025    PHOS 2.6 (L) 07/02/2025     LFTs:   Lab Results   Component Value Date    PROT 5.1 (L) 07/02/2025    ALBUMIN 1.7 (L) 07/02/2025    BILITOT 0.8 07/02/2025    AST 21 07/02/2025    ALKPHOS 88 07/02/2025    ALT 26 07/02/2025    GGT 80 (H) 06/22/2025     Coags:   Lab Results   Component Value Date    INR 1.0 02/07/2025     FLP:   Lab Results   Component Value Date    CHOL 101 (L) 02/07/2025    HDL 35 (L) 02/07/2025    LDLCALC 53.2 (L) 02/07/2025    TRIG 64 02/07/2025    CHOLHDL 34.7 02/07/2025     DM:   Lab Results   Component Value Date    HGBA1C 5.7 (H) 03/06/2024    HGBA1C 5.6 10/12/2023    HGBA1C 5.8 05/30/2013    GLUF 103 05/19/2014    LDLCALC 53.2 (L) 02/07/2025    CREATININE 0.7 07/02/2025     Thyroid:   Lab Results   Component Value Date    TSH 0.261 (L) 06/01/2025    FREET4 0.99 06/01/2025    L5NFPFN 9.1 03/06/2024    D7EYRNN 86 03/07/2017     Anemia:   Lab Results   Component Value Date    IRON 21 (L) 06/18/2025    TIBC 157 (L) 06/18/2025    FERRITIN 2,096.0 (H) 06/18/2025    CAQVTKZK10 474 08/29/2024    FOLATE 4.5 08/29/2024     Cardiac:   Lab Results   Component Value Date    TROPONINI 10.800 (H) 06/08/2025    BNP 63 06/18/2025     Urinalysis:   Lab Results   Component Value Date    COLORU Yellow 06/09/2025    SPECGRAV 1.010 06/09/2025    NITRITE Negative 06/09/2025    KETONESU Trace (A) 02/07/2025    UROBILINOGEN Negative 06/09/2025    WBCUA 4 06/09/2025       Trended Lab Data:  No results for input(s): "WBC", "HGB", "PLT", "MCV", "RDW", "BANDSPCT", "LYMPHOPCT", "MONOPCT", "EOSINOPCT", "BASOPCT", "NA", "K", "CL", "CO2", "BUN", "LABCREA", "GLU", "CALCIUM", "PROT", "ALBUMIN", "BILITOT", "AST", "ALKPHOS", "ALT" in the last 168 " "hours.    Invalid input(s): "HEMTAOCRIT", "SEGSPCT"      Trended Cardiac Data:  No results for input(s): "TROPONINI", "CKTOTAL", "CKMB", "BNP" in the last 168 hours.      Micro Results  No components found for: "CBLOOD"  No components found for: "CURINE"  No components found for: "CRESPWSM"    Radiology:  X-Ray Abdomen AP 1 View  Result Date: 6/27/2025  EXAMINATION: XR ABDOMEN AP 1 VIEW CLINICAL HISTORY: constipation, abd pain; FINDINGS: Abdomen one view: There is cardiomegaly.  There are small pleural effusions.  There is bibasal edema.  No obstruction, ileus, or perforation seen.  There is no significant constipation.  No acute process seen. Electronically signed by: Alexander Blankenship MD Date:    06/27/2025 Time:    12:25    US Lower Extremity Veins Bilateral  Result Date: 6/18/2025  EXAMINATION: US LOWER EXTREMITY VEINS BILATERAL CLINICAL HISTORY: r/o dvt; TECHNIQUE: Duplex and color flow Doppler and dynamic compression was performed of the bilateral lower extremity veins was performed. COMPARISON: 05/22/2025 FINDINGS: Duplex and color flow Doppler evaluation does not reveal any evidence of acute venous thrombosis in the common femoral, superficial femoral, greater saphenous, popliteal, peroneal, anterior tibial and posterior tibial veins bilaterally.  There is no reflux to suggest valvular incompetence.  Please note, there is scattered slow flow throughout the bilateral lower extremities.     No evidence of deep venous thrombosis in either lower extremity. Electronically signed by: Artur Velazquez MD Date:    06/18/2025 Time:    16:02    CT Chest Without Contrast  Result Date: 6/18/2025  EXAMINATION: CT CHEST WITHOUT CONTRAST CLINICAL HISTORY: Pneumonia, unresolved; TECHNIQUE: Using low dose technique the chest was surveyed from above the pulmonary apices through the posterior costophrenic angles.  Data was reconstructed for multiplanar images in axial, sagittal and coronal planes and for maximal intensity " projection images in the the axial, sagittal and coronal planes. COMPARISON: Multiple priors, most recent chest x-ray 06/18/2025 and CTA 06/10/2025 FINDINGS: Support tubes and lines: None. Aorta: Mild ectasia of the ascending aorta measuring 4.1 cm, similar to prior.  Mild atherosclerosis.  Aberrant right subclavian artery. Heart: Normal size. Coronary arteries: Multi-vessel calcific atherosclerosis of the coronary arteries. Pericardium: Trace pericardial effusion. Central pulmonary arteries: Normal caliber. Base of neck/thyroid: Unremarkable. Lymph nodes: No supraclavicular, axillary, internal mammary, mediastinal, or hilar adenopathy. Esophagus: Unremarkable. Pleura: Bilateral small pleural effusions with adjacent compressive atelectasis, new from prior. Upper abdomen: Fatty atrophy of the pancreas, unchanged.  Accessory splenules. Body wall: Unremarkable. Airways: Unremarkable. Lungs: Paraseptal and centrilobular emphysema.  Right and left ground-glass opacities, mostly affecting the lower lobes, new from prior.  Treatment changes of the left hilar region with associated volume loss and architectural distortion.  Stable 4 mm nodule in the left upper lobe. Bones: Degenerative changes of the spine.  Unchanged sclerotic focus in the posterior elements of T9.  Height loss of the superior endplate of L1, unchanged.     Interval development of bilateral small pleural effusions. Right and left ground-glass opacities, new from prior, concerning for progression of infectious process. Additional findings as above. Electronically signed by resident: Madeline Iniguez Date:    06/18/2025 Time:    10:07 Electronically signed by: Geovanny Dennis Date:    06/18/2025 Time:    11:16    X-Ray Chest AP Portable  Result Date: 6/18/2025  EXAMINATION: XR CHEST AP PORTABLE CLINICAL HISTORY: Chest Pain; TECHNIQUE: Single frontal view of the chest was performed. COMPARISON: 2025 FINDINGS: Similar perihilar and lower lobe infiltrates  within both lungs.  Findings suggestive of pneumonia versus pulmonary edema depending upon clinical scenario.  Stable appearance of the cardiomediastinal contours.  Probable small bilateral pleural effusions.     See above Electronically signed by: Solitario Rivas Date:    06/18/2025 Time:    08:11    X-Ray Chest 1 View  Result Date: 6/17/2025  EXAMINATION: XR CHEST 1 VIEW CLINICAL HISTORY: worsening hypoxia; FINDINGS: Chest one view: Heart size is normal.  There is perihilar and lower lobe edema and/or infiltrate.  Bones bowel gas are noncontributory.  There is emphysema.     Pulmonary edema versus pneumonia. Emphysema. Electronically signed by: Alexander Blankenship MD Date:    06/17/2025 Time:    07:58    Echo  Result Date: 6/11/2025    Left Ventricle: The left ventricle is normal in size. Ventricular mass is normal. Normal wall thickness. There is low normal systolic function with a visually estimated ejection fraction of 50 - 55%.   Inferolateral pseudodyskinesis   Right Ventricle: Right ventricle was not well visualized due to poor acoustic window. The right ventricle is normal in size Systolic function is mildly reduced.   IVC/SVC: Intermediate venous pressure at 8 mmHg.   RV function not well seen.     CTA Chest Non-Coronary (PE Studies)  Result Date: 6/10/2025  EXAMINATION: CTA CHEST NON CORONARY (PE STUDIES) CLINICAL HISTORY: r/o PE, signifcant tachycardia; TECHNIQUE: Low dose axial images, sagittal and coronal reformations were obtained from the thoracic inlet to the lung bases following the IV administration of 100 mL of Omnipaque 350.  Contrast timing was optimized to evaluate the pulmonary arteries.  MIP images were performed. COMPARISON: None FINDINGS: Pulmonary arteries:Pulmonary arteries are adequately enhanced.No filling defects to indicate pulmonary embolism. Thoracic soft tissues: Unremarkable. Aorta: Mild aneurysmal dilation of the ascending thoracic aorta to approximately 4.2 cm axial 256 of series 2.  Heart: Normal size. No effusion. Nathalie/Mediastinum: No pathologic carolyn enlargement. Airways: Patent. Lungs/Pleura: Prominent emphysematous changes throughout the lungs. Mild interstitial infiltrate or scarring posterior right upper lobe. Mild left basilar atelectasis. Minimal left upper lobe ground-glass infiltrate. Esophagus: Unremarkable. Upper Abdomen: No abnormality of the partially imaged upper abdomen. Bones: Moderate chronic compression fracture superior endplate of L1. Aberrant right subclavian artery noted as an anatomic variant.     1. No evidence of pulmonary embolism. 2. Mild aneurysm dilation of the ascending thoracic aorta to approximately 4.2 cm.  Recommend surveillance. 3. Emphysematous changes of the lungs. 4. Mild interstitial infiltrate or scarring in the posterior right upper lobe and minimal left upper lobe infiltrate.  Minimal pneumonitis is not excluded. 5. Aberrant right subclavian artery noted as an anatomic variant. 6. Moderate chronic compression fracture of the superior endplate of L1 Electronically signed by: Ryan Jackson Date:    06/10/2025 Time:    21:38      No results found for this or any previous visit (from the past 24 hours).    A1C   Lab Results   Component Value Date    HGBA1C 5.7 (H) 03/06/2024         Assessment/Plan:       MASD to Sacrum and Buttocks   Wound care clean with bath wipes apply Triad daily  Turn patient every 2 hours      Cellulitis to Left lower leg -resolved  Dermatitis to left foot -resolved   -wash with bath wipes apply lac-hydrin daily per bedside nurse     Rash to:  RIGHT & LEFT GROIN-resolved   RIGHT & LEFT ABDOMEN-resolved   -clean with bath wipes apply antifungal powder daily per bedside nurse     Pressure wound stage I to right top ankle -resolved  -leave JOSSUE monitor     Decreased Mobility   -Patient with decreased bed mobility therefore patient is at high risk for developing wounds and deterioration of any current wounds. Patient needs frequent  turning.     Nutrition :  Promote good nutrition to for improved wound healing.      Off-loading:   Follow facility bed policy for proper bed surface   Offload heels per facility protocol   Turn every 2 hours   Use Wheelchair Cushion     Patient Treatment Goals:  Short Term Goals  The wound base will be 25% .,demonstrate 25% more granulation tissue.  Short Term Goals  Patient wound status will improve/maintain without exacerbation infection of deterioration.  Wound Healing  Patient will have a decrease in wound size by 20% in 4 weeks.  Clinical Goals  Prevention of Infection is important for wound healing  Functional Goals:  The patient will achieve proper turning schedule.    -cont medical management     High Risk Because  -Chronic illness with SEVERE exacerbation, progression, or side effects of treatment.  -Acute or chronic illness or injury that poses a threat to life or bodily function    Notify Sheree Tao NP, or wound care RN if any new issues arise or if there is deterioration in documented wounds.    Sheree Tao FNP-C                           [1]   Social History  Tobacco Use    Smoking status: Former     Current packs/day: 0.00     Average packs/day: 1 pack/day for 40.0 years (40.0 ttl pk-yrs)     Types: Cigarettes     Start date: 8/15/1972     Quit date: 8/15/2012     Years since quittin.9     Passive exposure: Never    Smokeless tobacco: Never   Substance Use Topics    Alcohol use: Yes     Alcohol/week: 3.0 standard drinks of alcohol     Types: 3 Cans of beer per week     Comment: maybe 2-3  beers weekly    Drug use: No

## 2025-07-10 VITALS
TEMPERATURE: 99 F | BODY MASS INDEX: 27 KG/M2 | OXYGEN SATURATION: 94 % | HEART RATE: 101 BPM | DIASTOLIC BLOOD PRESSURE: 81 MMHG | HEIGHT: 69 IN | WEIGHT: 182.31 LBS | RESPIRATION RATE: 18 BRPM | SYSTOLIC BLOOD PRESSURE: 121 MMHG

## 2025-07-10 PROBLEM — L03.90 CELLULITIS: Status: RESOLVED | Noted: 2025-05-22 | Resolved: 2025-07-10

## 2025-07-10 PROBLEM — B34.8 PARAINFLUENZA VIRUS INFECTION: Status: RESOLVED | Noted: 2025-05-26 | Resolved: 2025-07-10

## 2025-07-10 PROBLEM — J18.9 PNEUMONIA OF RIGHT LUNG DUE TO INFECTIOUS ORGANISM: Status: RESOLVED | Noted: 2025-05-23 | Resolved: 2025-07-10

## 2025-07-10 PROCEDURE — 25000242 PHARM REV CODE 250 ALT 637 W/ HCPCS

## 2025-07-10 PROCEDURE — 63600175 PHARM REV CODE 636 W HCPCS: Performed by: STUDENT IN AN ORGANIZED HEALTH CARE EDUCATION/TRAINING PROGRAM

## 2025-07-10 PROCEDURE — 25000003 PHARM REV CODE 250: Performed by: STUDENT IN AN ORGANIZED HEALTH CARE EDUCATION/TRAINING PROGRAM

## 2025-07-10 PROCEDURE — 25000003 PHARM REV CODE 250

## 2025-07-10 PROCEDURE — 94640 AIRWAY INHALATION TREATMENT: CPT

## 2025-07-10 PROCEDURE — 99900035 HC TECH TIME PER 15 MIN (STAT)

## 2025-07-10 PROCEDURE — 94760 N-INVAS EAR/PLS OXIMETRY 1: CPT

## 2025-07-10 PROCEDURE — 27000221 HC OXYGEN, UP TO 24 HOURS

## 2025-07-10 PROCEDURE — 25000003 PHARM REV CODE 250: Performed by: HOSPITALIST

## 2025-07-10 RX ORDER — AMIODARONE HYDROCHLORIDE 200 MG/1
200 TABLET ORAL DAILY
Qty: 30 TABLET | Refills: 11 | Status: SHIPPED | OUTPATIENT
Start: 2025-07-11 | End: 2026-07-11

## 2025-07-10 RX ORDER — LEVALBUTEROL INHALATION SOLUTION 0.63 MG/3ML
1.26 SOLUTION RESPIRATORY (INHALATION) EVERY 6 HOURS PRN
Start: 2025-07-10 | End: 2026-07-10

## 2025-07-10 RX ORDER — BUDESONIDE 0.5 MG/2ML
0.5 INHALANT ORAL EVERY 6 HOURS PRN
Start: 2025-07-10 | End: 2026-07-10

## 2025-07-10 RX ORDER — POLYETHYLENE GLYCOL 3350 17 G/17G
17 POWDER, FOR SOLUTION ORAL 2 TIMES DAILY
Start: 2025-07-10

## 2025-07-10 RX ORDER — BISACODYL 10 MG/1
10 SUPPOSITORY RECTAL DAILY PRN
Start: 2025-07-10

## 2025-07-10 RX ADMIN — AMIODARONE HYDROCHLORIDE 200 MG: 200 TABLET ORAL at 08:07

## 2025-07-10 RX ADMIN — LEVETIRACETAM 500 MG: 500 TABLET, FILM COATED ORAL at 08:07

## 2025-07-10 RX ADMIN — POLYETHYLENE GLYCOL 3350 17 G: 17 POWDER, FOR SOLUTION ORAL at 08:07

## 2025-07-10 RX ADMIN — OXYCODONE HYDROCHLORIDE 5 MG: 5 TABLET ORAL at 12:07

## 2025-07-10 RX ADMIN — Medication: at 08:07

## 2025-07-10 RX ADMIN — FLUTICASONE PROPIONATE 100 MCG: 50 SPRAY, METERED NASAL at 08:07

## 2025-07-10 RX ADMIN — LEVALBUTEROL HYDROCHLORIDE 1.25 MG: 0.63 SOLUTION RESPIRATORY (INHALATION) at 07:07

## 2025-07-10 RX ADMIN — MORPHINE SULFATE 2 MG: 2 INJECTION, SOLUTION INTRAMUSCULAR; INTRAVENOUS at 08:07

## 2025-07-10 NOTE — PLAN OF CARE
07/10/25 1551   Final Note   Assessment Type Final Discharge Note   Anticipated Discharge Disposition [facility]  ([facility])   Hospital Resources/Appts/Education Provided Provided patient/caregiver with written discharge plan information;Provided education on problems/symptoms using teachback   Post-Acute Status   Post-Acute Authorization Placement   Post-Acute Placement Status Set-up Complete/Auth obtained  ([facility] / [facility])   Home Health Status   (NA)   Patient choice form signed by patient/caregiver List with quality metrics by geographic area provided   Discharge Delays (!) Ambulance Transport/Facility Transport  (DELAYED 2 HRS)     Pt discharged to [facility] [phone] [address] [place] [address]. Facility is handicap equipped, no need for modifications. Staff will assist with any needs or wants once there. DME will be provided by the facility. Floor nurse instructed to leave midline in arm so pt can continue to receive IV pain meds at [facility] per Dr Guthrie. Daughter Marilou signed Important Medicare Message Form and Patient Choice Letter. Nani NARAYANAN, unit secretary, charge & floor nurse notified to call report to nurse Segura [phone]. Emelyian scheduled for  approximately 5 pm, family is aware.

## 2025-07-10 NOTE — PLAN OF CARE
No issues verbalized  Problem: Adult Inpatient Plan of Care  Goal: Plan of Care Review  Outcome: Progressing  Goal: Patient-Specific Goal (Individualized)  Outcome: Progressing  Goal: Absence of Hospital-Acquired Illness or Injury  Outcome: Progressing  Goal: Optimal Comfort and Wellbeing  Outcome: Progressing  Goal: Readiness for Transition of Care  Outcome: Progressing

## 2025-07-10 NOTE — PLAN OF CARE
Pt pleasant and cooperative.  Comfort care. Drowsy. Confused at X's. Pt likes to drink his shakes but ate about 25% of breakfast/lunch. Morphine given in am for air hunger. Pain mediation given by pt requests. Repositioned q2hrs for comfort. Afebrile. Uneventful shift. Marilou (daughter) at bedside). Condom cath replaced. Safety maintained. Pt getting IV pain meds  so order to leave (Dr. Guthrie) left .  Report given to SALO Segura at [phone].   Pt went to [facility] escorted by [facility] at 17:25. No distress noted or c/o pain at this time.    Problem: Adult Inpatient Plan of Care  Goal: Absence of Hospital-Acquired Illness or Injury  Outcome: Progressing     Problem: Adult Inpatient Plan of Care  Goal: Optimal Comfort and Wellbeing  Outcome: Progressing     Problem: Adult Inpatient Plan of Care  Goal: Readiness for Transition of Care  Outcome: Progressing

## 2025-07-10 NOTE — PROGRESS NOTES
[facility] Medicine   Progress Note  Room: 207/207   Patient Name: Jordin Allen Jr.  MRN: [id number]  Admit Date: 6/3/2025   Length of Stay:  LOS: 37 days   Attending Physician: Bernardino Guthrie MD  Nurse Practitioner: Ernestine Palomino NP    Date of Service: 07/10/2025    Principal Problem:    Acute on chronic respiratory failure with hypoxia    Brief HPI:     Jordin Allen Jr. is a 79 y.o. 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  He [place] [facility] [place] with complaints of shortness of breath and left foot redness.  Admitted to hospital medicine team for left foot cellulitis and acute hypoxic respiratory failure secondary to pneumonia/COPD exacerbation. CTA chest negative for PE. Emphysematous change with superimposed edema. Nodular focus within the anterior aspect of left upper lobe.  Started on nebs, prednisone, and empiric vanc/cefepime and doxycycline.  Respiratory panel positive for parainfluenza virus.  Course further complicated by sinus tach with PACs/18, for which he was started on diltiazem.  Blood cultures positive for staph, suspected to be contaminant.  Repeat blood cultures returned negative.  He will switch to Augmentin and doxy.  Ongoing episodes of SVT, thought to be due to underlying hypoxia.  He was able to be weaned down to high-flow nasal cannula 15 L.  Discharged to [facility] on 06/03/2025 for rehab, wound care, and O2 weaning.     6/9-6/11-sent out to [facility] [place] concerns of [place]. Workup revealed findings concerning for pneumonia on CT. Started on empiric vanc and zosyn. Also started on 5 day course of prednisone for COPD exacerbation.    6/18 - 6/22-sent out to [facility] again for increasing O2 requirements and worsening AFib with RVR on diltiazem drip.  Use switch to amiodarone drip at [facility],  and transitioned to p.o. amiodarone successfully.  Infectious workup revealed concerns for aspiration pneumonia.  He was treated with vanc, cefepime, and levofloxacin.  Antibiotics de-escalated to oral levofloxacin.  He was weaned from BiPAP to nasal cannula at 3-5 L.      Interval History    Sleeping at time of exam  Seems to be having some slight moaning with respirations   Giving p.r.n. dose of morphine this morning  Orders placed for inpatient hospice      Subjective:     Review of Systems   Unable to perform ROS: Medical condition         Objective:     Vital Sign Range  Temp:  [97.4 °F (36.3 °C)-97.9 °F (36.6 °C)]   Pulse:  []   Resp:  [16-22]   BP: (118-142)/(77-89)   SpO2:  [94 %-99 %]     Body mass index is 26.91 kg/m².  Oxygen Concentration (%):  [40] 40        Intake/Output Summary (Last 24 hours) at 7/10/2025 0849  Last data filed at 7/9/2025 1832  Gross per 24 hour   Intake 250 ml   Output 300 ml   Net -50 ml       Physical Exam  Constitutional:       Appearance: He is ill-appearing.   HENT:      Head: Normocephalic and atraumatic.      Mouth/Throat:      Mouth: Mucous membranes are moist.      Pharynx: Oropharynx is clear.   Eyes:      Extraocular Movements: Extraocular movements intact.      Pupils: Pupils are equal, round, and reactive to light.   Cardiovascular:      Rate and Rhythm: Normal rate. Rhythm irregular.   Pulmonary:      Breath sounds: Wheezing and rhonchi present.   Abdominal:      General: Bowel sounds are normal.      Palpations: Abdomen is soft.   Musculoskeletal:      Right lower leg: Edema present.      Left lower leg: Edema present.   Skin:     General: Skin is warm and dry.   Neurological:      Mental Status: He is lethargic.   Psychiatric:         Mood and Affect: Affect is flat.         Wounds       Wound 05/19/25 0233 Moisture associated dermatitis Left dorsal Foot (Active)   05/19/25 0233 Foot   Present on Original Admission: Y   Primary Wound Type: Moisture ass   Side:  "Left   Orientation: dorsal        Wound 05/19/25 0231 Moisture associated dermatitis Left lateral Foot (Active)   05/19/25 0231 Foot   Present on Original Admission: Y   Primary Wound Type: Moisture ass   Side: Left   Orientation: lateral        Wound 05/19/25 0231 Pressure Injury Right anterior Ankle (Active)   05/19/25 0231 Ankle   Present on Original Admission: Y   Primary Wound Type: Pressure inj   Side: Right   Orientation: anterior        Wound 05/19/25 0233 Moisture associated dermatitis Left anterior Foot (Active)   05/19/25 0233 Foot   Present on Original Admission:    Primary Wound Type: Moisture ass   Side: Left   Orientation: anterior        Wound 06/04/25 1030 Moisture associated dermatitis Right lateral Abdomen (Active)   06/04/25 1030 Abdomen   Present on Original Admission: Y   Primary Wound Type: Moisture ass   Side: Right   Orientation: lateral        Wound 06/04/25 1030 Moisture associated dermatitis Left lateral Abdomen (Active)   06/04/25 1030 Abdomen   Present on Original Admission: Y   Primary Wound Type: Moisture ass   Side: Left   Orientation: lateral        Wound 06/04/25 1030 Moisture associated dermatitis Right Groin (Active)   06/04/25 1030 Groin   Present on Original Admission: Y   Primary Wound Type: Moisture ass   Side: Right        Wound 06/04/25 1030 Moisture associated dermatitis Left Groin (Active)   06/04/25 1030 Groin   Present on Original Admission: Y   Primary Wound Type: Moisture ass   Side: Left        Wound 06/04/25 1030 Moisture associated dermatitis Sacral spine (Active)   06/04/25 1030 Sacral spine   Present on Original Admission: Y   Primary Wound Type: Moisture ass         Wounds consistently followed by [facility] NP Sheree Tao     Labs:  No results for input(s): "WBC", "HGB", "HCT", "PLT" in the last 168 hours.    No results for input(s): "NA", "K", "CL", "CO2", "BUN", "CREATININE", "GLU", "CALCIUM", "MG", "PHOS", "LIPASE", "AMYLASE" in the last 168 " "hours.    No results for input(s): "ALKPHOS", "ALT", "AST", "ALBUMIN", "PROT", "BILITOT", "INR" in the last 168 hours.      No results for input(s): "POCTGLUCOSE" in the last 72 hours.      Meds Scheduled:   amiodarone  200 mg Oral Daily    amitriptyline  25 mg Oral QHS    ammonium lactate   Topical (Top) Daily    fluticasone propionate  2 spray Each Nostril Daily    levETIRAcetam  500 mg Oral BID    polyethylene glycol  17 g Oral BID       Current Inpatient Problem List:  Active Hospital Problems    Diagnosis  POA    *Acute on chronic respiratory failure with hypoxia [J96.21]  Yes    Comfort measures only status [Z51.5]  Not Applicable    Parainfluenza virus infection [B34.8]  Yes    Pneumonia of right lung due to infectious organism [J18.9]  Yes    Cellulitis [L03.90]  Yes    TIA (transient ischemic attack) [G45.9]  Yes     ASA and statin      Metastasis to brain [C79.31]  Yes    Adenocarcinoma, lung, 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       CAD (coronary artery disease) [I25.10]  Yes     Chronic     -LHC (10/31/13) for stemi: pLAD 100%, Cx with Li's, mRCA 40%, LVEF 55%,. LVEDP 15   -Xience 3.0 x 33 mm to pLAD, post dilated with 3.5 NC and 4.0 NC.   -TTE (8/14/13): LVEF 55%, grade I diastolic dysfunction      HTN (hypertension), benign [I10]  Yes    SHOLA (obstructive sleep apnea) [G47.33]  Yes     CPAP at night      GERD (gastroesophageal reflux disease) [K21.9]  Yes     Continue PPI        Resolved Hospital Problems   No resolved problems to display.         Assessment / Plan:     Acute respiratory failure with hypoxia  COPD with acute exacerbation  Parainfluenza virus infection  Pneumonia   On Symbicort Spiriva and Daliresp at home. Follows up with pulmonology outpatient.   Completed 7 day course of Augmentin and doxycycline on 06/01  Continue Daliresp " 500mcg daily  Continue Xopenex p.r.n.   Continue weaning high-flow nasal cannula  Continue guaifenesin 1200 mg b.i.d.  Completed 7 day course of levofloxacin on 6/25  Pulmonology consulted, appreciate recommendations  On 4L nasal cannula this morning   Will need to continue weaning O2, reaching out to inpatient hospice to see if O2 can be weaned there    Comfort measures only   7/2Discussion held again today at bedside with Mr. Allen and daughter on phone. We again discussed goals of care, and Mr. Allen stated he is tired, and just wants to be comfortable. He would like to transition to hospice. We discussed discontinuing non-comfort focused medications as well as labs and monitoring, which he is in agreement with. We also discussed adding morphine for air hunger and ativan for anxiety. He's requesting a dose of morphine now.  Adjusting orders to reflect comfort measures. Can d/c labs and cardiac monitoring. Morphine and Ativan ordered prn.  Received p.r.n. morphine and Ativan overnight for air hunger and anxiety   Ativan and morphine seem to be working well   Continue comfort care measures    Constipation, new   Last Bowel Movement: 07/08/25  Continue MiraLax b.i.d.   Continue suppository p.r.n.      Thrombocytopenia, new   Aspirin and mirtazapine discontinued on 06/27  Platelets improved to 125   Discontinued labs as patient is now on comfort measures only      Chest pain, new   Resolved   Evaluated by cardiology while in the hospital  Will need outpatient follow up with Dr. Ba with Cardiology     Cellulitis   Wound followed and managed by consistent, contracted [facility] NP  Completed 7 day course of doxy and Augmentin     Restless leg syndrome   Continue amitriptyline 25 mg nightly     Adenocarcinoma, lung, left   Metastasis to brain  Completed treatment with chemo/XRT. Brain XRT for lesionsx2. off immunotherapy since 12/24  suspect the LLL area was consolidation of prior RXT changes and not malignancy  and is nearly resolved.  CT on presentation with new SANJAY nodule, need op follow up with pulm  Continue prophylactic Keppra 500 mg b.i.d.    Right shoulder pain  Having some shoulder pain to his right shoulder, which seems to be some muscle spasms.    Continue Robaxin 500 mg q.i.d. p.r.n. on 6/6    Compression fracture of L1 vertebrae with routine healing   TLSO brace when out of bed     Dyslipidemia  TIA  CAD   Will discontinue aspirin and atorvastatin, transitioning to comfort measures     Tachycardia   Episodes of SVT while at the hospital   Evaluated by Cardiology   Likely worsened by underlying hypoxia   Supplemental O2   Uncontrolled AFib with RVR on 06/16, he was placed on diltiazem drip though rate remained difficult to control  Sent back to [facility] and ultimately started on amiodarone for rate control   Continue amiodarone 400 mg b.i.d. for 8 days, then transition to amiodarone 200 mg daily  Plan to continue amiodarone for comfort   Can discontinue cardiac monitoring       SHOLA   Continue CPAP nightly     GERD  Continue PPI daily     Left lateral foot wound   Right anterior ankle wound   Traumatic left dorsal foot wound   Left anterior foot venous ulcer   Skin tear right distal arm   Pressure injury nose  Wound followed and managed by consistent, contracted [facility] NP    ACP, 07/02/2025   This was a voluntary visit and the option to decline counseling was given  Length of discussion:  20 minutes  Life limiting problem:  Hypoxemia  Topics discussed:  Goals of care  Outcome of discussion:Mr. Allen stated he is tired, and just wants to be comfortable. He would like to transition to hospice. We discussed discontinuing non-comfort focused medications as well as labs and monitoring, which he is in agreement with. We also discussed adding morphine for air hunger and ativan for anxiety. He's requesting a dose of morphine now.  Adjusting orders to reflect comfort measures. Can d/c labs and cardiac monitoring.  "Morphine and Ativan ordered prn.  Decision Maker:Jordin Allen      ACP, 7/1/25  This was a voluntary visit and the option to decline counseling was given  Length of discussion: 16 minutes  Life limiting problem:  Respiratory failure  Topics discussed:  Hospice  Outcome of discussion: He states that he understands that he is likely at the end of life, and he "just wants to be comfortable". He would like to include his daughter on further discussions regarding hospice. Discussed with attending MD, may also be able to reach out to palliative care to see if patient could be transferred to HPU at [facility]. Giving dose of morphine 2mg for air hunger, and will reach out to palliative care today for further assistance.  Decision Maker: Jordin Allen          ACP 6/19  "Dr. Zaldivar and I held discussion with the patient regarding his goals of care. The patient states that he would want to be intubated for mechanical ventilation if he were to decompensate but in the event of a cardiac arrest, he would not want CPR and would want to be allowed to die peacefully." Per ALBA Argueta NP  Code status changed to DNR     Psychiatric Assessment  Patient Health Questionnaire-9 (PHQ-9)    Date:  06/05/2025  Total Score: 15  Screening is Positive   Diagnosis and Treatment Plan:  Moderate MDD   Continue amitriptyline 25 mg nightly   Increasing mirtazapine to 15 mg nightly, which will hopefully help with his appetite as well       Diet:  Cardiac, PRN as needed tray currently  GI Ppx:  PPI   DVT Ppx:  Enoxaparin     Lines:  PIV   Drains:  None   Airways:n/a   Wounds:  Multiple    Goals of Care:   Restorative,  Treat infection  Optimize nutrition  Wound healing  Muscle strengthening  Restoration of ADL's  Improve mobility    Anticipated Disposition:    Post inpatient Hospice    Follow up appointments:   Future Appointments   Date Time Provider Department Center   7/23/2025 10:15 AM CV [facility] ECHO [facility] CARDIA [place]   7/28/2025  1:30 PM NOM " [id number] [facility]   7/29/2025 10:15 AM Steven Campos MD [facility] NEUROSC [place]   7/29/2025 10:30 AM Bienvenido Henson MD [facility] RADONC3 [place]   8/15/2025  9:20 AM Bienvenido Ward MD [facility] PULMON [place]   8/27/2025  9:00 AM Guido Trejo MD [facility] ENT [place]   9/11/2025  8:30 AM Yan Palmer MD [facility] DERM [place], NP  Hospital Medicine [facility]    I have spent 51 minutes reviewing patient records (not including ACP time), examining, and  of the patient/ patient's family with greater than 50% of the time spent with direct patient care and coordination.

## 2025-07-10 NOTE — PLAN OF CARE
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       CAD (coronary artery disease) [I25.10]  Yes     Chronic     -C (10/31/13) for stemi: pLAD 100%, Cx with Li's, mRCA 40%, LVEF 55%,. LVEDP 15   -Xience 3.0 x 33 mm to pLAD, post dilated with 3.5 NC and 4.0 NC.   -TTE (8/14/13): LVEF 55%, grade I diastolic dysfunction      HTN (hypertension), benign [I10]  Yes    SHOLA (obstructive sleep apnea) [G47.33]  Yes     CPAP at night      GERD (gastroesophageal reflux disease) [K21.9]  Yes     Continue PPI        Resolved Hospital Problems    Diagnosis Date Resolved POA    Parainfluenza virus infection [B34.8] 07/10/2025 Yes    Pneumonia of right lung due to infectious organism [J18.9] 07/10/2025 Yes    Cellulitis [L03.90] 07/10/2025 Yes       Hospice Qualifying Diagnoses:        Patient has a life expectancy < 6 months due to:  Primary Hospice Diagnosis:  Acute respiratory failure with hypoxia   Comorbid Conditions Contributing to Decline:  Adenocarcinoma, lung, left   Metastasis to brain    Vital Signs: Routine per Hospice Protocol.    Code Status: DNR    Allergies:   Review of patient's allergies indicates:   Allergen Reactions    Brilinta [ticagrelor] Itching    Lisinopril      Other reaction(s): cough    Metoprolol succinate Rash        Diet:  Regular    Activities: As tolerated    Goals of Care Treatment Preferences:  Code Status: DNR       POLST: Yes  What is most important right now is to focus on remaining as independent as possible, symptom/pain control, improvement in condition but with limits to invasive therapies.  Accordingly, we have decided that the best plan to meet the patient's goals includes continuing with treatment.      Nursing: Per Hospice Routine.     Routine Skin for Bedridden Patients: Apply moisture barrier cream to all skin folds and   wet areas in perineal area daily and after baths and all bowel movements.        Oxygen: 4L n/c    Other Miscellaneous Care:   Sacrum and buttocks clean with normal saline apply Triad daily       Medications:        Medication List        START taking these medications      amiodarone 200 MG Tab  Commonly known as: PACERONE  Take 1 tablet (200 mg total) by mouth once daily.  Start taking on: July 11, 2025     bisacodyL 10 mg Supp  Commonly known as: DULCOLAX  Place 1 suppository (10 mg total) rectally daily as needed.     budesonide 0.5 mg/2 mL nebulizer solution  Commonly known as: PULMICORT  Take 2 mLs (0.5 mg total) by nebulization every 6 (six) hours as needed (S.O.B./wheeze). Controller     levalbuterol 0.63 mg/3 mL nebulizer solution  Commonly known as: XOPENEX  Take 6 mLs (1.26 mg total) by nebulization every 6 (six) hours as needed for Wheezing. Rescue     polyethylene glycol 17 gram Pwpk  Commonly known as: GLYCOLAX  Take 17 g by mouth 2 (two) times daily.  Replaces: polyethylene glycol 17 gram/dose powder    Morphine 2 mg q.4 hours p.r.n. pain or air hunger     Lorazepam 1 mg q.4 hours p.r.n. anxiety          CONTINUE taking these medications      fluticasone propionate 50 mcg/actuation nasal spray  Commonly known as: FLONASE  Spray 2 sprays (100 mcg total) in Each Nostril once daily.     levETIRAcetam 500 MG Tab  Commonly known as: KEPPRA  Take 1 tablet (500 mg total) by mouth  2 (two) times daily.            STOP taking these medications      acetaminophen 500 MG tablet  Commonly known as: TYLENOL     albuterol 0.63 mg/3 mL Nebu  Commonly known as: ACCUNEB     albuterol 90 mcg/actuation inhaler  Commonly known as: PROVENTIL/VENTOLIN HFA     amitriptyline 25 MG tablet  Commonly known as: ELAVIL     aspirin 81 MG EC tablet  Commonly known as: ECOTRIN     atorvastatin 80 MG tablet  Commonly known as: LIPITOR     BREZTRI AEROSPHERE 160-9-4.8 mcg/actuation Hfaa  Generic drug: budesonide-glycopyr-formoterol     dexAMETHasone 4 MG Tab  Commonly known as: DECADRON     echinacea 400 mg Cap     furosemide 20 MG tablet  Commonly known as: LASIX     ibuprofen 200 MG tablet  Commonly known as: ADVIL,MOTRIN     latanoprost 0.005 % ophthalmic solution     losartan 100 MG tablet  Commonly known as: COZAAR     nitroGLYCERIN 0.4 MG SL tablet  Commonly known as: NITROSTAT     OCUVITE ORAL     omeprazole 20 MG capsule  Commonly known as: PRILOSEC     polyethylene glycol 17 gram/dose powder  Commonly known as: GLYCOLAX  Replaced by: polyethylene glycol 17 gram Pwpk     roflumilast 500 mcg Tab  Commonly known as: DALIRESP     SENNA ORAL     traZODone 50 MG tablet  Commonly known as: DESYREL                    Future Orders:  Hospice Medical Director may dictate new orders for comfortable care measures & sign death certificate.        _________________________________  Ernestine Palomino NP  07/10/2025

## 2025-07-10 NOTE — CARE UPDATE
"   07/09/25 1950   Patient Assessment/Suction   Level of Consciousness (AVPU) alert   Respiratory Effort Normal;Unlabored   Expansion/Accessory Muscles/Retractions expansion symmetric;no retractions;no use of accessory muscles   All Lung Fields Breath Sounds diminished   Rhythm/Pattern, Respiratory unlabored   Cough Frequency no cough   PRE-TX-O2   Device (Oxygen Therapy) high flow nasal cannula   $ Is the patient on Low Flow Oxygen? Yes   Flow (L/min) (Oxygen Therapy) 4   SpO2 (!) 94 %   Pulse Oximetry Type Intermittent   $ Pulse Oximetry - Single Charge Pulse Oximetry - Single   Pulse (!) 133   Resp 20   Aerosol Therapy   $ Aerosol Therapy Charges PRN treatment not required   Respiratory Treatment Status (SVN) PRN treatment not required   General Safety Checklist   Safety Promotion/Fall Prevention side rails raised   Respiratory Interventions   Cough And Deep Breathing done independently per patient   Airway/Ventilation Management airway patency maintained;calming measures promoted;humidification applied;pulmonary hygiene promoted   Airway/Ventilation Support comfort measures provided;cough relief provided;dyspnea relief promoted;humidification applied;pulmonary hygiene promoted   Breathing Techniques/Airway Clearance deep/controlled cough encouraged;diaphragmatic breathing promoted   Preset CPAP/BiPAP Settings   Mode Of Delivery home unit;standby   CPAP/BIPAP charged w/in last 24 h NO   Education   $ Education Bronchodilator;Oxygen;Other (see comment)  (CPAP)   Respiratory Evaluation   $ Care Plan Tech Time 15 min   Oxygen Care Plan   Oxygen Care Plan Per Protocol   SPO2 Goal (%) MD order   Rationale SpO2 is <MD Goal   Bronchodilator Care Plan   Bronchodilator Care Plan Aerosol   Aerosol Meds w/ frequency Other  (XOPENEX 1.26 MG PRN, BUDESONIDE 0.5 MG PRN)   Rationale Wheezing;Chest "tightness" with breathing;Shortness of breath (increased WOB)       "

## 2025-07-10 NOTE — PROGRESS NOTES
07/10/25 0933   Pain/Comfort/Sleep   Preferred Pain Scale number (Numeric Rating Pain Scale)   Comfort/Acceptable Pain Level 2   Pain Rating (0-10): Rest 0   Pain Rating (0-10): Activity 0   Cognitive   Level of Consciousness (AVPU) alert   Pupils   Pupil PERRLA yes   Pupil Size Left 3 mm   Pupil Shape Left round   Pupil Reaction Left brisk;equal   Pupil Accommodation Left normal response   Pupil Size Right 3 mm   Pupil Shape Right round   Pupil Reaction Right brisk;equal   Pupil Accommodation Right normal response   Rosales Coma Scale   Best Eye Response 4-->(E4) spontaneous   Best Motor Response 6-->(M6) obeys commands   Best Verbal Response 5-->(V5) oriented   Roasles Coma Scale Score 15   Hand /Ankle Strength   Hand , Left strong   Hand , Right strong   Dorsiflexion, Left strong   Dorsiflexion, Right strong   Plantarflexion, Left strong   Plantarflexion, Right strong   Peripheral Neurovascular   Capillary Refill, LUE less than/equal to 3 secs   Capillary Refill, RUE less than/equal to 3 secs   Capillary Refill, LLE greater than 3 secs   Capillary Refill, RLE greater than 3 secs   All Extremities Neurovascular Assessment   General All Extremity Temperature warm   General All Extremity Color marbella   General All Extremity Sensation no numbness;no tingling   Pulse Radial   Left Radial Pulse 2+ (normal)   Right Radial Pulse 2+ (normal)   Pulse Dorsalis Pedis   Left Dorsalis Pedis Pulse 1+ (weak)   Right Dorsalis Pedis Pulse 1+ (weak)   Edema   Edema leg, left;leg, right;ankle, right;ankle, left;foot, left;foot, right   Dependent Edema 2+ (Mild)   Generalized Edema 1+ (Trace)   Arm, Left Edema 1+ (Trace)   Arm, Right Edema 1+ (Trace)   Wrist, Left Edema 1+ (Trace)   Wrist, Right Edema 1+ (Trace)   Leg, Left Edema 3+ (Moderate)   Leg, Right Edema 3+ (Moderate)   Ankle, Left Edema 3+ (Moderate)   Ankle, Right Edema 3+ (Moderate)   Foot, Left Edema 3+ (Moderate)   Foot, Right Edema 3+ (Moderate)         Midline Catheter - Single Lumen 06/14/25 1245 Left basilic vein (medial side of arm) other (see comments)   Placement Date/Time: 06/14/25 1245   Present Prior to Hospital Arrival?: No  Hand Hygiene: Performed  Barrier Precautions: Performed  Skin Antisepsis: ChloraPrep  Device: Bard  Side: Left  Location: basilic vein (medial side of arm)  Size: (c) other (...   Site Assessment Clean;Dry;Intact;No redness;No swelling   IV Device Securement catheter securement device   Dressing Type Central line dressing   Dressing Status Clean;Dry;Intact   Dressing Intervention Integrity maintained   Dressing Change Due 07/12/25        Wound 06/04/25 1030 Moisture associated dermatitis Sacral spine   Date First Assessed/Time First Assessed: 06/04/25 1030   Present on Original Admission: Yes  Primary Wound Type: (c) Moisture associated dermatitis  Location: (c) Sacral spine   Dressing Appearance Open to air   Drainage Amount None   Periwound Area Moist;Pink   Care Cleansed with:;Wound cleanser   Dressing   (applied Triad paste, open to air)   Skin Interventions   Device Skin Pressure Protection positioning supports utilized   Safety   Safety Precautions emergency equipment at bedside   Safety Management   Safety Promotion/Fall Prevention assistive device/personal item within reach;bed alarm set;lighting adjusted;pulse ox   Patient Rounds bed in low position;bed wheels locked;call light in patient/parent reach;clutter free environment maintained;ID band on;placement of personal items at bedside;toileting offered;visualized patient   Positioning   Body Position weight shifting   Head of Bed (HOB) Positioning HOB at 45 degrees   Hygiene Care   Bathing/Skin Care back care;bath, complete;dressed/undressed;incontinence care;linen changed   Oral Care antiseptic solution;lip/mouth moisturizer applied   Perineal Care absorbent brief/pad changed;catheter care provided   Hygiene Assistance per care provider x 1   CHG Bath Bath completed   Linen  Change linen change completed

## 2025-07-14 ENCOUNTER — OUTPATIENT CASE MANAGEMENT (OUTPATIENT)
Dept: ADMINISTRATIVE | Facility: OTHER | Age: 80
End: 2025-07-14
Payer: MEDICARE

## 2025-07-15 ENCOUNTER — EXTERNAL HOME HEALTH (OUTPATIENT)
Dept: HOME HEALTH SERVICES | Facility: HOSPITAL | Age: 80
End: 2025-07-15
Payer: MEDICARE

## 2025-07-15 ENCOUNTER — PATIENT MESSAGE (OUTPATIENT)
Dept: PULMONOLOGY | Facility: CLINIC | Age: 80
End: 2025-07-15
Payer: MEDICARE

## 2025-07-21 NOTE — DISCHARGE SUMMARY
[facility] Medicine  Discharge Summary  Room: 207/207   Patient Name: Jordin Allen Jr.  MRN: [id number]  Admit Date: 6/3/2025   Date of Discharge:  7/10/2025  Length of Stay:  LOS: 37 days   Attending Physician: No att. providers found  Nurse Practitioner: Ernestine Palomino NP  Code Status:  Full Code    Date of Service: 07/21/2025    Principal Problem: Acute on chronic respiratory failure with hypoxia    HPI  Jordin Allen Jr. is a 79 y.o. 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 He presented to [facility] ED on 01/22/2025 with complaints of shortness of breath and left foot redness. Admitted to hospital medicine team for left foot cellulitis and acute hypoxic respiratory failure secondary to pneumonia/COPD exacerbation. CTA chest negative for PE. Emphysematous change with superimposed edema. Nodular focus within the anterior aspect of left upper lobe. Started on nebs, prednisone, and empiric vanc/cefepime and doxycycline. Respiratory panel positive for parainfluenza virus. Course further complicated by sinus tach with PACs/18, for which he was started on diltiazem. Blood cultures positive for staph, suspected to be contaminant. Repeat blood cultures returned negative. He will switch to Augmentin and doxy. Ongoing episodes of SVT, thought to be due to underlying hypoxia. He was able to be weaned down to high-flow nasal cannula 15 L. Discharged to [facility] on 06/03/2025 for rehab, wound care, and O2 weaning.     Hospital Course  Admitted to [facility] on 06/03/2025 for rehab, wound care, and O2 weaning.    6/9-6/11-sent out to [facility] for concerns of MI. Workup revealed findings concerning for pneumonia on CT. Started on empiric vanc and zosyn. Also started on 5 day course of prednisone for COPD exacerbation.     6/18 - 6/22-sent  "out to [facility] again for increasing O2 requirements and worsening AFib with RVR on diltiazem drip.  Use switch to amiodarone drip at OM, and transitioned to p.o. amiodarone successfully.  Infectious workup revealed concerns for aspiration pneumonia.  He was treated with vanc, cefepime, and levofloxacin.  Antibiotics de-escalated to oral levofloxacin.  He was weaned from BiPAP to nasal cannula at 3-5 L.    After discussions regarding goals of care, was ultimately decided that patient would like to proceed with hospice.  He was transitioned to comfort measures and discharge to [facility] inpatient hospice facility on 07/10/2025            No results for input(s): "WBC", "HGB", "HCT", "PLT" in the last 168 hours.  No results for input(s): "NA", "K", "CL", "CO2", "BUN", "CREATININE", "GLU", "CALCIUM", "MG", "PHOS", "LIPASE", "AMYLASE" in the last 168 hours.  No results for input(s): "ALKPHOS", "ALT", "AST", "ALBUMIN", "PROT", "BILITOT", "INR" in the last 168 hours.   No results for input(s): "CPK", "CPKMB", "MB", "TROPONINI" in the last 72 hours.  No results for input(s): "LACTATE" in the last 72 hours.    No results for input(s): "POCTGLUCOSE" in the last 168 hours.   Hemoglobin A1C   Date Value Ref Range Status   03/06/2024 5.7 (H) 4.0 - 5.6 % Final     Comment:     ADA Screening Guidelines:  5.7-6.4%  Consistent with prediabetes  >or=6.5%  Consistent with diabetes    High levels of fetal hemoglobin interfere with the HbA1C  assay. Heterozygous hemoglobin variants (HbS, HgC, etc)do  not significantly interfere with this assay.   However, presence of multiple variants may affect accuracy.     10/12/2023 5.6 4.0 - 5.6 % Final     Comment:     ADA Screening Guidelines:  5.7-6.4%  Consistent with prediabetes  >or=6.5%  Consistent with diabetes    High levels of fetal hemoglobin interfere with the HbA1C  assay. Heterozygous hemoglobin variants (HbS, HgC, etc)do  not significantly interfere with this assay.   However, " presence of multiple variants may affect accuracy.     05/30/2013 5.8 4.0 - 6.2 % Final       Procedures: none    Consultants: pulm    Discharge Medication List as of 7/10/2025  5:22 PM        START taking these medications    Details   bisacodyL (DULCOLAX) 10 mg Supp Place 1 suppository (10 mg total) rectally daily as needed., Starting Thu 7/10/2025, No Print      budesonide (PULMICORT) 0.5 mg/2 mL nebulizer solution Take 2 mLs (0.5 mg total) by nebulization every 6 (six) hours as needed (S.O.B./wheeze). Controller, Starting Thu 7/10/2025, Until Fri 7/10/2026 at 2359, No Print      levalbuterol (XOPENEX) 0.63 mg/3 mL nebulizer solution Take 6 mLs (1.26 mg total) by nebulization every 6 (six) hours as needed for Wheezing. Rescue, Starting Thu 7/10/2025, Until Fri 7/10/2026 at 2359, No Print      polyethylene glycol (GLYCOLAX) 17 gram PwPk Take 17 g by mouth 2 (two) times daily., Starting Thu 7/10/2025, No Print           CONTINUE these medications which have CHANGED    Details   amiodarone (PACERONE) 200 MG Tab Take 1 tablet (200 mg total) by mouth once daily., Starting Fri 7/11/2025, Until Sat 7/11/2026, Normal           CONTINUE these medications which have NOT CHANGED    Details   fluticasone propionate (FLONASE) 50 mcg/actuation nasal spray Spray 2 sprays (100 mcg total) in Each Nostril once daily., Starting Wed 10/23/2024, Normal      levETIRAcetam (KEPPRA) 500 MG Tab Take 1 tablet (500 mg total) by mouth 2 (two) times daily., Starting Mon 3/24/2025, Until Tue 3/24/2026, No Print           STOP taking these medications       0.9% NaCl SolP 250 mL with vancomycin 1,000 mg SolR 1,000 mg Comments:   Reason for Stopping:         acetaminophen (TYLENOL) 500 MG tablet Comments:   Reason for Stopping:         albuterol (ACCUNEB) 0.63 mg/3 mL Nebu Comments:   Reason for Stopping:         albuterol (PROVENTIL/VENTOLIN HFA) 90 mcg/actuation inhaler Comments:   Reason for Stopping:         amitriptyline (ELAVIL) 25 MG  tablet Comments:   Reason for Stopping:         apixaban (ELIQUIS) 5 mg Tab Comments:   Reason for Stopping:         aspirin (ECOTRIN) 81 MG EC tablet Comments:   Reason for Stopping:         atorvastatin (LIPITOR) 80 MG tablet Comments:   Reason for Stopping:         benzonatate (TESSALON) 100 MG capsule Comments:   Reason for Stopping:         BETA-CAROTENE,A, W-C & E/MIN (OCUVITE ORAL) Comments:   Reason for Stopping:         budesonide-glycopyr-formoterol (BREZTRI AEROSPHERE) 160-9-4.8 mcg/actuation HFAA Comments:   Reason for Stopping:         D5W PgBk 100 mL with piperacillin-tazobactam 4.5 gram SolR 4.5 g Comments:   Reason for Stopping:         dexAMETHasone (DECADRON) 4 MG Tab Comments:   Reason for Stopping:         dextrose 50% injection Comments:   Reason for Stopping:         dextrose 50% injection Comments:   Reason for Stopping:         diltiaZEM (CARDIZEM CD) 180 MG 24 hr capsule Comments:   Reason for Stopping:         echinacea 400 mg Cap Comments:   Reason for Stopping:         furosemide (LASIX) 20 MG tablet Comments:   Reason for Stopping:         glucose 4 GM chewable tablet Comments:   Reason for Stopping:         glucose 4 GM chewable tablet Comments:   Reason for Stopping:         guaiFENesin (MUCINEX) 600 mg 12 hr tablet Comments:   Reason for Stopping:         ibuprofen (ADVIL,MOTRIN) 200 MG tablet Comments:   Reason for Stopping:         ipratropium (ATROVENT) 0.02 % nebulizer solution Comments:   Reason for Stopping:         latanoprost 0.005 % ophthalmic solution Comments:   Reason for Stopping:         levoFLOXacin (LEVAQUIN) 750 MG tablet Comments:   Reason for Stopping:         losartan (COZAAR) 100 MG tablet Comments:   Reason for Stopping:         melatonin (MELATIN) 3 mg tablet Comments:   Reason for Stopping:         nitroGLYCERIN (NITROSTAT) 0.4 MG SL tablet Comments:   Reason for Stopping:         omeprazole (PRILOSEC) 20 MG capsule Comments:   Reason for Stopping:          polyethylene glycol (GLYCOLAX) 17 gram/dose powder Comments:   Reason for Stopping:         predniSONE (DELTASONE) 50 MG Tab Comments:   Reason for Stopping:         roflumilast (DALIRESP) 500 mcg Tab Comments:   Reason for Stopping:         sennosides (SENNA ORAL) Comments:   Reason for Stopping:         traZODone (DESYREL) 50 MG tablet Comments:   Reason for Stopping:               Discharge Diet:regular diet with Normal Fluid intake of 1500 - 2000 mL per day    Activity: activity as tolerated    Discharge Condition: Fair    Tests pending at the time of discharge: none      Disposition: Hospice/Medical Facility    Future Appointments   Date Time Provider Department Center   7/23/2025 10:15 AM [facility] E[facility] [place]   7/28/2025  1:30 PM [facility] OIC-MRI4 [facility]   7/29/2025 10:15 AM Steven Campos MD [facility] NEUROSC [place]   7/29/2025 10:30 AM Bienvenido Henson MD [facility] RADONC3 [place]   8/13/2025 11:20 AM Bienvenido Ward MD [facility] PULMON [place]   8/27/2025  9:00 AM Guido Trejo MD [facility] ENT [address]   9/11/2025  8:30 AM Yan Palmer MD [facility] DERM [address]       12 minutes total time spent on the discharge of the patient including review of hospital course with the patient, reviewing discharge medications, and arranging follow-up care.      Ernestine Palomino NP  Hospital Medicine [facility]

## 2025-07-22 ENCOUNTER — TELEPHONE (OUTPATIENT)
Dept: NEUROSURGERY | Facility: CLINIC | Age: 80
End: 2025-07-22
Payer: MEDICARE

## 2025-07-22 NOTE — TELEPHONE ENCOUNTER
----- Message from Bienvenido Henson MD sent at 7/22/2025 12:46 PM CDT -----  Mr. Allen was transitioned to inpatient hospice on 7/10/25 after over a month inpatient. His appointments (MRI and follow ups) next week can be cancelled.

## 2025-07-30 ENCOUNTER — PATIENT MESSAGE (OUTPATIENT)
Dept: PALLIATIVE MEDICINE | Facility: CLINIC | Age: 80
End: 2025-07-30
Payer: MEDICARE

## 2025-07-30 ENCOUNTER — EXTERNAL HOME HEALTH (OUTPATIENT)
Dept: HOME HEALTH SERVICES | Facility: HOSPITAL | Age: 80
End: 2025-07-30
Payer: MEDICARE

## 2025-07-31 ENCOUNTER — EXTERNAL CHRONIC CARE MANAGEMENT (OUTPATIENT)
Dept: PRIMARY CARE CLINIC | Facility: CLINIC | Age: 80
End: 2025-07-31
Payer: MEDICARE

## 2025-07-31 PROCEDURE — 99490 CHRNC CARE MGMT STAFF 1ST 20: CPT | Mod: S$PBB,,, | Performed by: INTERNAL MEDICINE

## 2025-07-31 PROCEDURE — 99490 CHRNC CARE MGMT STAFF 1ST 20: CPT | Mod: PBBFAC,PO | Performed by: INTERNAL MEDICINE

## 2025-08-22 ENCOUNTER — TELEPHONE (OUTPATIENT)
Dept: HEMATOLOGY/ONCOLOGY | Facility: CLINIC | Age: 80
End: 2025-08-22
Payer: MEDICARE